# Patient Record
Sex: FEMALE | Race: BLACK OR AFRICAN AMERICAN | NOT HISPANIC OR LATINO | Employment: OTHER | URBAN - METROPOLITAN AREA
[De-identification: names, ages, dates, MRNs, and addresses within clinical notes are randomized per-mention and may not be internally consistent; named-entity substitution may affect disease eponyms.]

---

## 2018-04-05 NOTE — PROGRESS NOTES
Subjective:      Patient ID: Kathie Quezada is a 58 y.o. female.    Chief Complaint:  Thyroid Nodule    History of Present Illness  Kathie Quezada presents today for Evaluation & management of hypothyroidism & type 2 DM.  This is her first visit with me.     She was seeing MDs at [facility] and wants to switch everything over to [facility]. Will get release of records form signed to obtain surgical reports and pathology.     Patient had a stroke ~ 2 years ago.     Had a total thyroidectomy in 08/2017 at [facility] Dr. Colon for mng that was causing compressive symptoms. Per patient pathology was benign. Had FNAs done previously per patient all were benign as well.     Had trouble after surgery with her vocal cords and choking on saliva. Had another surgery to fix the vocal cords and per patient it did not help.    With regards to her hypothyroidism:    Current medication:  synthroid 175 mcg    Takes thyroid medication properly without food first thing in the morning     current symptoms:   + weight gain  + Fatigue   Denies Constipation   + Hair loss  + Brittle nails  + Mental fog   No cp, palpations or sob  Denies heat intolerance  + cold intolerance    Last BMD dated: never     Also has Type 2 DM.   With regards to the diabetes:    Current regimen:  lantus 20 Units AM (pen)    Other meds tried: Metformin (GI Issues)    Missed doses? Yes occ    1 times a day testing  Log reviewed: meter present  7 day avg 116  14 day avg 112  30 day avg 116   116, 109, 118, 105, 133,     Eats 2 meals a day.   Snacks no      Drinks diet coke & water    Exercise - tried to stay active     Hypoglycemic event? None   Knows how to correct with 15 grams of carbs- juice, coke, or a peppermint.     Last eye exam: + DR > 1 year ago. Needs referral   Last podiatry exam: Yes Dr. Duckworth.  + neuropathy      Education - last visit: none    With regards to hypertension:  Tolerating ACEI     With regards to dyslipidemia:  Tolerating statin  "    She had a total hysterectomy for fibroids in 1996.    Also per patient had a hepatitis C antibody that was positive (see media in chart) will need referral to hepatology. She states that she took the "medication" but can't remember name or how long she took it.     Review of Systems   Constitutional: Positive for fatigue and unexpected weight change (gain).   Eyes: Negative for visual disturbance.   Respiratory: Negative for cough and shortness of breath.    Cardiovascular: Negative for chest pain.   Gastrointestinal: Negative for abdominal pain.   Endocrine: Positive for cold intolerance. Negative for heat intolerance, polydipsia, polyphagia and polyuria.   Musculoskeletal: Negative for arthralgias.   Skin: Negative for wound.   Neurological: Negative for headaches.   Hematological: Does not bruise/bleed easily.   Psychiatric/Behavioral: Negative for sleep disturbance.     Objective:   Physical Exam   Constitutional: She appears well-developed.   HENT:   Right Ear: External ear normal.   Left Ear: External ear normal.   Nose: Nose normal.   Hearing Normal     Neck: No tracheal deviation present.   Cardiovascular: Normal rate.    No murmur heard.  No edema present   Pulmonary/Chest: Effort normal and breath sounds normal.   Abdominal: Soft. She exhibits no mass. No hernia.   Neurological: She is alert. No cranial nerve deficit or sensory deficit.   Skin: No rash noted.   Psychiatric: She has a normal mood and affect. Judgment normal.   Vitals reviewed.  No palpable thyroid tissue  Scar well healed   injection sites are ok. No lipo hypertropthy or atrophy  Appropriate footwear, Foot exam deferred per patient. Wears diabetic shoes.     Body mass index is 34.23 kg/m².    Lab Review:   No results found for: HGBA1C  No results found for: CHOL, HDL, LDLCALC, TRIG, CHOLHDL  Lab Results   Component Value Date     11/05/2015    K 3.7 11/05/2015     11/05/2015    CO2 28 11/05/2015     (H) 11/05/2015    " BUN 13 11/05/2015    CREATININE 0.8 11/05/2015    CALCIUM 9.3 11/05/2015    ANIONGAP 11 11/05/2015    ESTGFRAFRICA >60.0 11/05/2015    EGFRNONAA >60.0 11/05/2015     Labs from 12/7/2017 brought by patient  TSH 1.790  T4 1.44  A1C 6.5%    Assessment and Plan     1. Postoperative hypothyroidism  TSH    Vitamin D    DXA Bone Density Spine And Hip   2. Type 2 diabetes mellitus with other circulatory complication, with long-term current use of insulin  Ambulatory referral to Optometry    Hemoglobin A1c    Lipid panel    Comprehensive metabolic panel    Microalbumin/creatinine urine ratio    CBC auto differential   3. Essential hypertension     4. Mixed hyperlipidemia  Lipid panel   5. Positive hepatitis C antibody test  Ambulatory Referral to Hepatology   6. Other long term (current) drug therapy   Vitamin D   7. Asymptomatic menopausal state   DXA Bone Density Spine And Hip     Postoperative hypothyroidism  -- Clinically hypothyroid  -- Goal is a normal TSH  -- Check TFTs today and adjust dosage accordingly   -- Avoid exogenous hyperthyroidism as this can accelerate bone loss and increase risk of CV complications.  -- Advised to take LT4 on an empty stomach with water and to wait 30-45 minutes before eating or taking other medications   -- Reviewed that symptoms of hypothyroidism may not correlate with tsh, and a normal TSH is the goal of therapy.....  symptoms are not a justification for over treatment   -- If still have vocal cord issues at 3 month appt will refer her to ENT.    -- check BMD & Vit D d/t long term lt4 use possibly causing bone loss       Type 2 diabetes mellitus with circulatory disorder, with long-term current use of insulin  -- Labs & urine today   -- Reviewed goals of therapy are to get the best control we can without hypoglycemia  Medication changes:   Continue lantus 20u daily   -- Reviewed patient's current insulin regimen. Clarified proper insulin dose and timing in relation to meals, etc.  Insulin injection sites and proper rotation instructed.    -- Advised frequent self blood glucose monitoring.  Patient encouraged to document glucose results and bring them to every clinic visit    -- Hypoglycemia precautions discussed. Instructed on precautions before driving.    -- Call for Bg repeatedly < 90 or > 180.   -- Close adherence to lifestyle changes recommended.   -- Periodic follow ups for eye evaluations, foot care and dental care suggested.  -- Schedule optometry appt     Essential hypertension  -- on ACEI   -- Controlled  -- Blood pressure goals discussed with patient    Mixed hyperlipidemia  Controlled   On statin per ADA recommendations  Check LP today     Positive hepatitis C antibody test  -- refer to hepatology       Follow-up in about 3 months (around 7/9/2018).    Case discussed with Dr. Leija    Recommendations were discussed with the patient in detail  The patient verbalized understanding and agrees with the plan outlined as above.

## 2018-04-09 ENCOUNTER — TELEPHONE (OUTPATIENT)
Dept: ENDOCRINOLOGY | Facility: CLINIC | Age: 58
End: 2018-04-09

## 2018-04-09 ENCOUNTER — TELEPHONE (OUTPATIENT)
Dept: HEPATOLOGY | Facility: CLINIC | Age: 58
End: 2018-04-09

## 2018-04-09 ENCOUNTER — OFFICE VISIT (OUTPATIENT)
Dept: ENDOCRINOLOGY | Facility: CLINIC | Age: 58
End: 2018-04-09
Payer: MEDICARE

## 2018-04-09 ENCOUNTER — LAB VISIT (OUTPATIENT)
Dept: LAB | Facility: HOSPITAL | Age: 58
End: 2018-04-09
Payer: MEDICARE

## 2018-04-09 VITALS
DIASTOLIC BLOOD PRESSURE: 68 MMHG | WEIGHT: 245.38 LBS | HEIGHT: 71 IN | BODY MASS INDEX: 34.35 KG/M2 | HEART RATE: 64 BPM | SYSTOLIC BLOOD PRESSURE: 128 MMHG

## 2018-04-09 DIAGNOSIS — R76.8 POSITIVE HEPATITIS C ANTIBODY TEST: ICD-10-CM

## 2018-04-09 DIAGNOSIS — R80.9 PROTEINURIA, UNSPECIFIED TYPE: Primary | ICD-10-CM

## 2018-04-09 DIAGNOSIS — I10 ESSENTIAL HYPERTENSION: ICD-10-CM

## 2018-04-09 DIAGNOSIS — Z79.899 OTHER LONG TERM (CURRENT) DRUG THERAPY: ICD-10-CM

## 2018-04-09 DIAGNOSIS — Z79.4 TYPE 2 DIABETES MELLITUS WITH OTHER CIRCULATORY COMPLICATION, WITH LONG-TERM CURRENT USE OF INSULIN: ICD-10-CM

## 2018-04-09 DIAGNOSIS — E11.59 TYPE 2 DIABETES MELLITUS WITH OTHER CIRCULATORY COMPLICATION, WITH LONG-TERM CURRENT USE OF INSULIN: ICD-10-CM

## 2018-04-09 DIAGNOSIS — E89.0 POSTOPERATIVE HYPOTHYROIDISM: Primary | ICD-10-CM

## 2018-04-09 DIAGNOSIS — E78.2 MIXED HYPERLIPIDEMIA: ICD-10-CM

## 2018-04-09 DIAGNOSIS — E89.0 POSTOPERATIVE HYPOTHYROIDISM: ICD-10-CM

## 2018-04-09 DIAGNOSIS — Z78.0 ASYMPTOMATIC MENOPAUSAL STATE: ICD-10-CM

## 2018-04-09 LAB
25(OH)D3+25(OH)D2 SERPL-MCNC: 11 NG/ML
ALBUMIN SERPL BCP-MCNC: 3.1 G/DL
ALP SERPL-CCNC: 135 U/L
ALT SERPL W/O P-5'-P-CCNC: 25 U/L
ANION GAP SERPL CALC-SCNC: 8 MMOL/L
AST SERPL-CCNC: 36 U/L
BASOPHILS # BLD AUTO: 0.05 K/UL
BASOPHILS NFR BLD: 0.5 %
BILIRUB SERPL-MCNC: 0.3 MG/DL
BUN SERPL-MCNC: 27 MG/DL
CALCIUM SERPL-MCNC: 8.8 MG/DL
CHLORIDE SERPL-SCNC: 108 MMOL/L
CHOLEST SERPL-MCNC: 209 MG/DL
CHOLEST/HDLC SERPL: 2.7 {RATIO}
CO2 SERPL-SCNC: 26 MMOL/L
CREAT SERPL-MCNC: 1.3 MG/DL
DIFFERENTIAL METHOD: ABNORMAL
EOSINOPHIL # BLD AUTO: 0.1 K/UL
EOSINOPHIL NFR BLD: 1.4 %
ERYTHROCYTE [DISTWIDTH] IN BLOOD BY AUTOMATED COUNT: 13.9 %
EST. GFR  (AFRICAN AMERICAN): 53 ML/MIN/1.73 M^2
EST. GFR  (NON AFRICAN AMERICAN): 46 ML/MIN/1.73 M^2
ESTIMATED AVG GLUCOSE: 103 MG/DL
GLUCOSE SERPL-MCNC: 119 MG/DL
HBA1C MFR BLD HPLC: 5.2 %
HCT VFR BLD AUTO: 34.1 %
HDLC SERPL-MCNC: 77 MG/DL
HDLC SERPL: 36.8 %
HGB BLD-MCNC: 10.9 G/DL
IMM GRANULOCYTES # BLD AUTO: 0.04 K/UL
IMM GRANULOCYTES NFR BLD AUTO: 0.4 %
LDLC SERPL CALC-MCNC: 97.4 MG/DL
LYMPHOCYTES # BLD AUTO: 2.1 K/UL
LYMPHOCYTES NFR BLD: 20.8 %
MCH RBC QN AUTO: 27.3 PG
MCHC RBC AUTO-ENTMCNC: 32 G/DL
MCV RBC AUTO: 86 FL
MONOCYTES # BLD AUTO: 0.7 K/UL
MONOCYTES NFR BLD: 6.9 %
NEUTROPHILS # BLD AUTO: 7.2 K/UL
NEUTROPHILS NFR BLD: 70 %
NONHDLC SERPL-MCNC: 132 MG/DL
NRBC BLD-RTO: 0 /100 WBC
PLATELET # BLD AUTO: 169 K/UL
PMV BLD AUTO: 10.7 FL
POTASSIUM SERPL-SCNC: 3.5 MMOL/L
PROT SERPL-MCNC: 7.3 G/DL
RBC # BLD AUTO: 3.99 M/UL
SODIUM SERPL-SCNC: 142 MMOL/L
T4 FREE SERPL-MCNC: 0.92 NG/DL
TRIGL SERPL-MCNC: 173 MG/DL
TSH SERPL DL<=0.005 MIU/L-ACNC: 20.45 UIU/ML
WBC # BLD AUTO: 10.21 K/UL

## 2018-04-09 PROCEDURE — 84443 ASSAY THYROID STIM HORMONE: CPT

## 2018-04-09 PROCEDURE — 99999 PR PBB SHADOW E&M-EST. PATIENT-LVL IV: CPT | Mod: PBBFAC,,, | Performed by: NURSE PRACTITIONER

## 2018-04-09 PROCEDURE — 80061 LIPID PANEL: CPT

## 2018-04-09 PROCEDURE — 84439 ASSAY OF FREE THYROXINE: CPT

## 2018-04-09 PROCEDURE — 82306 VITAMIN D 25 HYDROXY: CPT

## 2018-04-09 PROCEDURE — 36415 COLL VENOUS BLD VENIPUNCTURE: CPT

## 2018-04-09 PROCEDURE — 85025 COMPLETE CBC W/AUTO DIFF WBC: CPT

## 2018-04-09 PROCEDURE — 99204 OFFICE O/P NEW MOD 45 MIN: CPT | Mod: S$PBB,,, | Performed by: NURSE PRACTITIONER

## 2018-04-09 PROCEDURE — 80053 COMPREHEN METABOLIC PANEL: CPT

## 2018-04-09 PROCEDURE — 99214 OFFICE O/P EST MOD 30 MIN: CPT | Mod: PBBFAC | Performed by: NURSE PRACTITIONER

## 2018-04-09 PROCEDURE — 83036 HEMOGLOBIN GLYCOSYLATED A1C: CPT

## 2018-04-09 RX ORDER — LEVOTHYROXINE SODIUM 175 UG/1
175 TABLET ORAL DAILY
COMMUNITY
Start: 2018-01-23 | End: 2018-04-09

## 2018-04-09 RX ORDER — TRAZODONE HYDROCHLORIDE 50 MG/1
50 TABLET ORAL DAILY PRN
Refills: 0 | Status: ON HOLD | COMMUNITY
Start: 2018-02-26 | End: 2019-05-30 | Stop reason: SDUPTHER

## 2018-04-09 RX ORDER — LISINOPRIL 40 MG/1
20 TABLET ORAL DAILY
Status: ON HOLD | COMMUNITY
Start: 2018-01-21 | End: 2019-05-29 | Stop reason: SDUPTHER

## 2018-04-09 RX ORDER — ERGOCALCIFEROL 1.25 MG/1
CAPSULE ORAL
Qty: 12 CAPSULE | Refills: 0 | Status: SHIPPED | OUTPATIENT
Start: 2018-04-09 | End: 2018-06-06 | Stop reason: SDUPTHER

## 2018-04-09 RX ORDER — ATORVASTATIN CALCIUM 10 MG/1
TABLET, FILM COATED ORAL
Refills: 1 | Status: ON HOLD | COMMUNITY
Start: 2018-01-23 | End: 2019-05-30 | Stop reason: SDUPTHER

## 2018-04-09 RX ORDER — AMLODIPINE BESYLATE 5 MG/1
TABLET ORAL
COMMUNITY
Start: 2018-03-05 | End: 2018-05-16 | Stop reason: ALTCHOICE

## 2018-04-09 RX ORDER — INSULIN GLARGINE 100 [IU]/ML
20 INJECTION, SOLUTION SUBCUTANEOUS DAILY
Refills: 1 | COMMUNITY
Start: 2018-01-26 | End: 2018-10-18

## 2018-04-09 RX ORDER — LEVOTHYROXINE SODIUM 200 UG/1
200 TABLET ORAL DAILY
Qty: 30 TABLET | Refills: 11 | Status: SHIPPED | OUTPATIENT
Start: 2018-04-09 | End: 2019-05-13 | Stop reason: SDUPTHER

## 2018-04-09 RX ORDER — CARVEDILOL 25 MG/1
25 TABLET ORAL 2 TIMES DAILY WITH MEALS
COMMUNITY
Start: 2018-03-02 | End: 2019-11-08 | Stop reason: SDUPTHER

## 2018-04-09 NOTE — ASSESSMENT & PLAN NOTE
-- Clinically hypothyroid  -- Goal is a normal TSH  -- Check TFTs today and adjust dosage accordingly   -- Avoid exogenous hyperthyroidism as this can accelerate bone loss and increase risk of CV complications.  -- Advised to take LT4 on an empty stomach with water and to wait 30-45 minutes before eating or taking other medications   -- Reviewed that symptoms of hypothyroidism may not correlate with tsh, and a normal TSH is the goal of therapy.....  symptoms are not a justification for over treatment   -- If still have vocal cord issues at 3 month appt will refer her to ENT.    -- check BMD & Vit D d/t long term lt4 use possibly causing bone loss

## 2018-04-09 NOTE — ASSESSMENT & PLAN NOTE
-- Labs & urine today   -- Reviewed goals of therapy are to get the best control we can without hypoglycemia  Medication changes:   Continue lantus 20u daily   -- Reviewed patient's current insulin regimen. Clarified proper insulin dose and timing in relation to meals, etc. Insulin injection sites and proper rotation instructed.    -- Advised frequent self blood glucose monitoring.  Patient encouraged to document glucose results and bring them to every clinic visit    -- Hypoglycemia precautions discussed. Instructed on precautions before driving.    -- Call for Bg repeatedly < 90 or > 180.   -- Close adherence to lifestyle changes recommended.   -- Periodic follow ups for eye evaluations, foot care and dental care suggested.  -- Schedule optometry appt

## 2018-04-09 NOTE — TELEPHONE ENCOUNTER
Please contact pt to schedule for an appointment. Orders are in per Hemalatha Hollins NP --Positive hepatitis C antibody test

## 2018-04-09 NOTE — TELEPHONE ENCOUNTER
----- Message from Hemalatha Hollins NP sent at 4/9/2018 12:05 PM CDT -----  Please schedule TSH in 4 weeks and alert patient.    Thank you,   Hemalatha

## 2018-04-09 NOTE — TELEPHONE ENCOUNTER
Dr. Hemalatha Hollins ordered that patient be scheduled for hep c consult.  I spoke with patient and scheduled her to see PA Scheuermann on 4/23; appt notice mailed.  See media for hep c quant result.

## 2018-04-09 NOTE — TELEPHONE ENCOUNTER
----- Message from Kisha Kirkpatrick sent at 4/9/2018  2:07 PM CDT -----  Contact: Pt  Pt calling to schedule appointment    Pt. [phone]

## 2018-04-09 NOTE — TELEPHONE ENCOUNTER
Spoke with pt and gave a contact no for Hepatology for schedule appointment.  Endo dept dont have a access for schedule.

## 2018-04-09 NOTE — PROGRESS NOTES
Spoke with patient and ensured that she is taking synthroid 175 mcg daily and that she took it this morning. She stated that she did and she takes it every day. Informed her about her lab results and increased her syntrhoid to 200 mcg daily. Also informed her about her vitamin D level being low, ordering ergo for 5 days+7weeks. She verbalized understanding. TSH in 4 weeks.

## 2018-04-20 ENCOUNTER — HOSPITAL ENCOUNTER (OUTPATIENT)
Dept: RADIOLOGY | Facility: CLINIC | Age: 58
Discharge: HOME | End: 2018-04-20
Attending: NURSE PRACTITIONER
Payer: MEDICARE

## 2018-04-20 DIAGNOSIS — Z78.0 ASYMPTOMATIC MENOPAUSAL STATE: ICD-10-CM

## 2018-04-20 DIAGNOSIS — E89.0 POSTOPERATIVE HYPOTHYROIDISM: ICD-10-CM

## 2018-04-20 PROCEDURE — 77080 DXA BONE DENSITY AXIAL: CPT | Mod: 26,,, | Performed by: INTERNAL MEDICINE

## 2018-04-20 PROCEDURE — 77080 DXA BONE DENSITY AXIAL: CPT | Mod: TC

## 2018-04-26 ENCOUNTER — TELEPHONE (OUTPATIENT)
Dept: HEPATOLOGY | Facility: CLINIC | Age: 58
End: 2018-04-26

## 2018-04-26 NOTE — TELEPHONE ENCOUNTER
----- Message from Unique Good sent at 4/26/2018 10:17 AM CDT -----  Contact: pt  Calling to resched missed appt from 4/23    Pt contact [phone]

## 2018-05-02 ENCOUNTER — OFFICE VISIT (OUTPATIENT)
Dept: OPTOMETRY | Facility: CLINIC | Age: 58
End: 2018-05-02
Payer: MEDICARE

## 2018-05-02 DIAGNOSIS — E11.3293 MILD NONPROLIFERATIVE DIABETIC RETINOPATHY OF BOTH EYES WITHOUT MACULAR EDEMA ASSOCIATED WITH TYPE 2 DIABETES MELLITUS: Primary | ICD-10-CM

## 2018-05-02 DIAGNOSIS — H57.12 DISCOMFORT OF LEFT EYE: ICD-10-CM

## 2018-05-02 DIAGNOSIS — H52.4 MYOPIA WITH PRESBYOPIA OF BOTH EYES: ICD-10-CM

## 2018-05-02 DIAGNOSIS — H52.13 MYOPIA WITH PRESBYOPIA OF BOTH EYES: ICD-10-CM

## 2018-05-02 PROCEDURE — 92004 COMPRE OPH EXAM NEW PT 1/>: CPT | Mod: S$PBB,,, | Performed by: OPTOMETRIST

## 2018-05-02 PROCEDURE — 99212 OFFICE O/P EST SF 10 MIN: CPT | Mod: PBBFAC | Performed by: OPTOMETRIST

## 2018-05-02 PROCEDURE — 92015 DETERMINE REFRACTIVE STATE: CPT | Mod: ,,, | Performed by: OPTOMETRIST

## 2018-05-02 PROCEDURE — 99999 PR PBB SHADOW E&M-EST. PATIENT-LVL II: CPT | Mod: PBBFAC,,, | Performed by: OPTOMETRIST

## 2018-05-02 NOTE — LETTER
May 2, 2018      Hemalatha Hollins, NP  [address]  [facility] [address]           [facility]  [address]  [facility] [address]  Phone: [phone]  Fax: [phone]          Patient: Kathie Quezada   MR Number: [id number]   YOB: 1960   Date of Visit: 5/2/2018       Dear Hemalatha Hollins:    Thank you for referring Kathie Quezada to me for evaluation. Attached you will find relevant portions of my assessment and plan of care.    If you have questions, please do not hesitate to call me. I look forward to following Kathie Quezada along with you.    Sincerely,    Sherin Santizo, OD    Enclosure  CC:  No Recipients    If you would like to receive this communication electronically, please contact [email or web address] or [phone] to request more information on [employer] Link access.    For providers and/or their staff who would like to refer a patient to [facility], please contact us through our one-stop-shop provider referral line, [facility], at [phone].    If you feel you have received this communication in error or would no longer like to receive these types of communications, please e-mail [email or web address]

## 2018-05-02 NOTE — PROGRESS NOTES
HPI     Pt here for Diabetic /Hypertensive eye exam   Last Blood Sugar - (119 yesterday)  IRENA - last year     Pt wearing bifocal glasses 2-3 years old no changes in va with glasses but   does need to update glasses today.   FB sensation in OS x 2-3 days ago.  Tried using Visine gtts does not help     (-)Flashes (+)Floaters  (+)Itch, (+)tear, (+)burn, (+)Dryness.  (+) OTC Drops  (+)Photophobia  (+)Glare    Pt denies any eye sx or injuries       FAMOHX:   Mother - Glaucoma         Hemoglobin A1C       Date                     Value               Ref Range             Status                04/09/2018               5.2                 4.0 - 5.6 %           Final            ----------      Last edited by Krystle Whitney on 5/2/2018 11:00 AM. (History)            Assessment /Plan     For exam results, see Encounter Report.    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  Educated pt on diabetic changes.  Continue working on good blood sugar control.  Monitor 6 months.   2.  No foreign body found.  Recommend artificial tears as needed.  3.  Bifocal rx given

## 2018-05-07 ENCOUNTER — LAB VISIT (OUTPATIENT)
Dept: LAB | Facility: OTHER | Age: 58
End: 2018-05-07
Payer: MEDICARE

## 2018-05-07 DIAGNOSIS — E89.0 POSTOPERATIVE HYPOTHYROIDISM: ICD-10-CM

## 2018-05-07 LAB
T4 FREE SERPL-MCNC: 1.45 NG/DL
TSH SERPL DL<=0.005 MIU/L-ACNC: 0.14 UIU/ML

## 2018-05-07 PROCEDURE — 36415 COLL VENOUS BLD VENIPUNCTURE: CPT

## 2018-05-07 PROCEDURE — 84443 ASSAY THYROID STIM HORMONE: CPT

## 2018-05-07 PROCEDURE — 84439 ASSAY OF FREE THYROXINE: CPT

## 2018-05-08 ENCOUNTER — TELEPHONE (OUTPATIENT)
Dept: ENDOCRINOLOGY | Facility: CLINIC | Age: 58
End: 2018-05-08

## 2018-05-08 DIAGNOSIS — E89.0 POSTOPERATIVE HYPOTHYROIDISM: Primary | ICD-10-CM

## 2018-05-08 NOTE — TELEPHONE ENCOUNTER
Spoke with the pt and advise her to take half a pill of synthroid on Sunday and do one pill mon to sat. Also schedule for TSH lab in 4 weeks and alert the pt.

## 2018-05-16 ENCOUNTER — LAB VISIT (OUTPATIENT)
Dept: LAB | Facility: HOSPITAL | Age: 58
End: 2018-05-16
Payer: MEDICARE

## 2018-05-16 ENCOUNTER — INITIAL CONSULT (OUTPATIENT)
Dept: HEPATOLOGY | Facility: CLINIC | Age: 58
End: 2018-05-16
Payer: MEDICARE

## 2018-05-16 ENCOUNTER — OFFICE VISIT (OUTPATIENT)
Dept: NEPHROLOGY | Facility: CLINIC | Age: 58
End: 2018-05-16
Payer: MEDICARE

## 2018-05-16 VITALS
OXYGEN SATURATION: 98 % | DIASTOLIC BLOOD PRESSURE: 82 MMHG | RESPIRATION RATE: 18 BRPM | SYSTOLIC BLOOD PRESSURE: 181 MMHG | TEMPERATURE: 98 F | WEIGHT: 240.94 LBS | BODY MASS INDEX: 34.49 KG/M2 | HEIGHT: 70 IN | HEART RATE: 84 BPM

## 2018-05-16 VITALS
OXYGEN SATURATION: 98 % | SYSTOLIC BLOOD PRESSURE: 140 MMHG | DIASTOLIC BLOOD PRESSURE: 80 MMHG | HEIGHT: 71 IN | HEART RATE: 88 BPM | WEIGHT: 240.75 LBS | BODY MASS INDEX: 33.7 KG/M2

## 2018-05-16 DIAGNOSIS — E11.59 TYPE 2 DIABETES MELLITUS WITH OTHER CIRCULATORY COMPLICATION, WITH LONG-TERM CURRENT USE OF INSULIN: Primary | ICD-10-CM

## 2018-05-16 DIAGNOSIS — N18.31 CKD STAGE G3A/A3, GFR 45-59 AND ALBUMIN CREATININE RATIO >300 MG/G: ICD-10-CM

## 2018-05-16 DIAGNOSIS — Z79.4 TYPE 2 DIABETES MELLITUS WITH OTHER CIRCULATORY COMPLICATION, WITH LONG-TERM CURRENT USE OF INSULIN: Primary | ICD-10-CM

## 2018-05-16 DIAGNOSIS — R76.8 POSITIVE HEPATITIS C ANTIBODY TEST: ICD-10-CM

## 2018-05-16 DIAGNOSIS — B18.2 CHRONIC HEPATITIS C WITHOUT HEPATIC COMA: Primary | ICD-10-CM

## 2018-05-16 DIAGNOSIS — K76.9 LIVER LESION: ICD-10-CM

## 2018-05-16 DIAGNOSIS — I10 ESSENTIAL HYPERTENSION: ICD-10-CM

## 2018-05-16 LAB
BACTERIA #/AREA URNS AUTO: ABNORMAL /HPF
BILIRUB UR QL STRIP: NEGATIVE
CLARITY UR REFRACT.AUTO: ABNORMAL
COLOR UR AUTO: YELLOW
CREAT UR-MCNC: 112 MG/DL
GLUCOSE UR QL STRIP: ABNORMAL
HGB UR QL STRIP: ABNORMAL
HYALINE CASTS UR QL AUTO: 0 /LPF
KETONES UR QL STRIP: ABNORMAL
LEUKOCYTE ESTERASE UR QL STRIP: NEGATIVE
MICROSCOPIC COMMENT: ABNORMAL
NITRITE UR QL STRIP: NEGATIVE
PH UR STRIP: 6 [PH] (ref 5–8)
PROT UR QL STRIP: ABNORMAL
PROT UR-MCNC: 509 MG/DL
PROT/CREAT RATIO, UR: 4.54
RBC #/AREA URNS AUTO: 9 /HPF (ref 0–4)
SP GR UR STRIP: 1.01 (ref 1–1.03)
SQUAMOUS #/AREA URNS AUTO: 5 /HPF
URN SPEC COLLECT METH UR: ABNORMAL
UROBILINOGEN UR STRIP-ACNC: NEGATIVE EU/DL
WBC #/AREA URNS AUTO: 1 /HPF (ref 0–5)

## 2018-05-16 PROCEDURE — 99213 OFFICE O/P EST LOW 20 MIN: CPT | Mod: PBBFAC,27 | Performed by: INTERNAL MEDICINE

## 2018-05-16 PROCEDURE — 99203 OFFICE O/P NEW LOW 30 MIN: CPT | Mod: S$PBB,,, | Performed by: PHYSICIAN ASSISTANT

## 2018-05-16 PROCEDURE — 99204 OFFICE O/P NEW MOD 45 MIN: CPT | Mod: S$PBB,GC,, | Performed by: INTERNAL MEDICINE

## 2018-05-16 PROCEDURE — 99999 PR PBB SHADOW E&M-EST. PATIENT-LVL IV: CPT | Mod: PBBFAC,,, | Performed by: PHYSICIAN ASSISTANT

## 2018-05-16 PROCEDURE — 81001 URINALYSIS AUTO W/SCOPE: CPT

## 2018-05-16 PROCEDURE — 82570 ASSAY OF URINE CREATININE: CPT

## 2018-05-16 PROCEDURE — 99999 PR PBB SHADOW E&M-EST. PATIENT-LVL III: CPT | Mod: PBBFAC,GC,, | Performed by: INTERNAL MEDICINE

## 2018-05-16 PROCEDURE — 99214 OFFICE O/P EST MOD 30 MIN: CPT | Mod: PBBFAC | Performed by: PHYSICIAN ASSISTANT

## 2018-05-16 RX ORDER — CHLORTHALIDONE 25 MG/1
25 TABLET ORAL DAILY
Qty: 30 TABLET | Refills: 11 | Status: SHIPPED | OUTPATIENT
Start: 2018-05-16 | End: 2019-01-14

## 2018-05-16 NOTE — PROGRESS NOTES
Subjective:       Patient ID: Kathie Quezada is a 58 y.o. Black or  female who presents for new evaluation of Proteinuria    HPI   Ms. Quezada is a 59 yo AAF with HTN, T2DM, and +HCV Ab who was referred to nephrology by her endocrinologist for evaluation of proteinuria and decreased eGFR. She has recently transferred her care to [facility] from [facility]. Chart review reveals only two data points: sCr 0.8 in 2015 and sCr 1.3 (with eGFR 53) in 4/2018. Similarly, ACR was 47.8 in 2006 and most recently 2788 on 4/2018.     She was diagnosed with T2DM prior to 2005. She has been on insulin since ~2008. Diabetes has been decently controlled with highest A1c in the 7-8s. Most recent A1c was 5.2%. Last eye exam on 5/2/18 with bilateral diabetic retinopathy. She was initially diagnosed with this in 2016.   She was diagnosed with HTN in the 1980s. BP has been controlled on-and-off; reports a h/o dietary non-compliance which is no improved. +h/o hospitalization for HTN emergency; + h/o CVA.   She has a h/o HCV Ab+. She completed Harvoni treatment in 2015.   She denies personal h/o kidney stones, recurrent UTIs, or significant NSAID use. She denies a family h/o kidney disease or ESRD.       Past Medical History:   Diagnosis Date    CVA (cerebral vascular accident)     Diabetes mellitus, type 2     Factor XI deficiency     Hyperlipidemia     Hypertension     Postoperative hypothyroidism      Past Surgical History:   Procedure Laterality Date    BACK SURGERY      galbladder      HYSTERECTOMY      TONSILLECTOMY       Family History   Problem Relation Age of Onset    Diabetes type II Mother      Social History     Social History    Marital status:      Spouse name: N/A    Number of children: N/A    Years of education: N/A     Social History Main Topics    Smoking status: Never Smoker    Smokeless tobacco: Never Used    Alcohol use Yes    Drug use: Unknown    Sexual activity: Not Asked  "    Other Topics Concern    None     Social History Narrative    None         Review of Systems    Const: no fevers or chills  Endo: stays cold all the time. +polydipsia  Eyes: +vision changes and blurry vision; holding off on laser surgery  HENT: no sore throat. +rhinorrhea and hoarseness ever since thyroid surgery  Heme: +easy bleeding/bruising 2/2 factor XI deficiency  CV: no chest pain. +BLE edema that started a few months ago (amlodipine started around same time)  Resp: no SOB or cough  GI: +diarrhea; no constipation  : +polyuria. +nocturia. No dysuria or hematuria.   Neuro: + neuropathy in bilateral feet; rarely in hands.   Skin: no new rashes or lesions; +dry skin      Objective:       Current Outpatient Prescriptions:     amLODIPine (NORVASC) 5 MG tablet, , Disp: , Rfl:     atorvastatin (LIPITOR) 10 MG tablet, TK 1 T PO QHS, Disp: , Rfl: 1    carvedilol (COREG) 25 MG tablet, , Disp: , Rfl:     ergocalciferol (ERGOCALCIFEROL) 50,000 unit Cap, Take 1 capsules once daily for 5 days, then 1 capsule weekly for 7 weeks. Yes I want daily for 5 days + weekly for 7 weeks, Disp: 12 capsule, Rfl: 0    LANTUS SOLOSTAR U-100 INSULIN 100 unit/mL (3 mL) InPn pen, Inject 20 Units into the skin once daily., Disp: , Rfl: 1    lisinopril (PRINIVIL,ZESTRIL) 40 MG tablet, Take 40 mg by mouth once daily., Disp: , Rfl:     SYNTHROID 200 mcg tablet, Take 1 tablet (200 mcg total) by mouth once daily. BRAND NAME ONLY, Disp: 30 tablet, Rfl: 11    traZODone (DESYREL) 50 MG tablet, Take 50 mg by mouth once daily., Disp: , Rfl: 0    Physical Exam    Blood pressure (!) 140/80, pulse 88, height 5' 11" (1.803 m), weight 109.2 kg (240 lb 11.9 oz), SpO2 98 %.  General: WDWN AAF in NAD.  Eyes: EOMI, clear conjunctivae  HENT: NC/AT. MMM.  Neck: supple, no thyromegaly  Lymph: no cervical or supraclavicular LAD  CV: RRR. BLE with 1+ pitting edema and chronic venous stasis changes  Resp: Decreased breath sounds. Normal effort. CTAB, no " crackles.   Abd: Soft, NTND  Skin: warm, normal turgor. No visible rash  Neuro: AAO. No focal deficits      Assessment:       1. Type 2 diabetes mellitus with other circulatory complication, with long-term current use of insulin    2. CKD stage G3a/A3, GFR 45-59 and albumin creatinine ratio >300 mg/g    3. Positive hepatitis C antibody test    4. Essential hypertension        Plan:   1. CKD:   - unsure baseline renal function; most recent sCr consistent with CKD stage 3  - proteinuria present since at least 2006; currently approaching nephrotic range at 2.8g  - most likely related to diabetic nephropathy; especially given diabetic retinopathy. Cannot r/o contribution from HTN given significant history of HTN-related complications.   - will order serologies today to r/o other etiologies  - will repeat RFP today to help establish baseline GFR  - UA and UPCR today. If similar results as ACR, will order 24-hour urine protein:creatinine  - renal US  - HTN: goal BP < 130/80       Lab Results   Component Value Date    CREATININE 1.3 04/09/2018       Protein Creatinine Ratios: see above. Continue max dose lisinopril. Educated patient about low salt diet.   - ordered UA, UPCR, JB, ANCA, C3, C4, ALCIDES, and FLC to look for other etiologies aside from diabetes. Also ordered RF and cryo given h/o HCV.     No results found for: UTPCR    Acid-Base: wnl.   Lab Results   Component Value Date     04/09/2018    K 3.5 04/09/2018    CO2 26 04/09/2018     2. HTN: Blood pressures uncontrolled on current regimen; also with peripheral edema. Will discontinue amlodipine and replace with chlorthalidone 25mg daily.     3. Renal osteodystrophy: currently with CKD stage 3a. Will re-evaluate upon progression of disease.   Lab Results   Component Value Date    CALCIUM 8.8 04/09/2018       4. Anemia: unlikely to be renal in origin given CKD stage 3a. However in setting of anemia and proteinuria, will order ALCIDES and FLC. See above.   Lab Results    Component Value Date    HGB 10.9 (L) 04/09/2018        5. DM:  Last HbA1C controlled. Continue current management.   Lab Results   Component Value Date    HGBA1C 5.2 04/09/2018       6. Lipid management: would benefit from high intensity statin; will defer to PCP.   Lab Results   Component Value Date    LDLCALC 97.4 04/09/2018       Follow up in 3 months.    Deborah Holguin, PGY-5  Nephrology Fellow  [facility]  Pager: [phone]

## 2018-05-16 NOTE — LETTER
May 16, 2018      Hemalatha Hollins, NP  [address]  [facility] [address]           [facility]  [address]  [facility] [address]  Phone: [phone]  Fax: [phone]          Patient: Kathie Quezada   MR Number: [id number]   YOB: 1960   Date of Visit: 5/16/2018       Dear Hemalatha Hollins:    Thank you for referring Kathie Quezada to me for evaluation. Attached you will find relevant portions of my assessment and plan of care.    If you have questions, please do not hesitate to call me. I look forward to following Kathie Quezada along with you.    Sincerely,    Jennifer B. Scheuermann, PA    Enclosure  CC:  No Recipients    If you would like to receive this communication electronically, please contact [email or web address] or [phone] to request more information on [employer] Link access.    For providers and/or their staff who would like to refer a patient to [facility], please contact us through our one-stop-shop provider referral line, [facility], at [phone].    If you feel you have received this communication in error or would no longer like to receive these types of communications, please e-mail [email or web address]

## 2018-05-16 NOTE — PROGRESS NOTES
"HEPATOLOGY CLINIC VISIT NOTE - HCV clinic    REFERRING PROVIDER: Hemalatha Hollins NP    CHIEF COMPLAINT: Hepatitis C     HISTORY: This is a 58 y.o. Black or  female referred for a history of HCV    Pt reports being dx w/ HCV a few years ago  She was seen by Dr Herbert ([facility]) & treated w/ harvoni - unclear of 8 vs 12 wks duration  Had gallbladder problems towards end of treatment & required surgery. Does not think she ever had labs to assess for SVR w/ Dr Herbert; doesn't know if she's cured or not.    Does not recall being told of liver staging prior to HCV treatment    Recent labs reveal no obvious liver dysfunction or evidence of advanced liver fibrosis    10/2015 U/S in [facility] - hepatomegaly. Spleen not measured.   2/2016 MRI in [facility] - "Partially evaluated liver demonstrates several foci of ill-defined T2 hyperintensity and portal venous hyper enhancement in hepatic segment VI.  Largest focus measures approximately 2.6 cm in diameter"   Indeterminate, favors benign etiology    Unclear if above imaging was followed up on w/ outside GI    Pt feels well  Denies jaundice, dark urine, abdominal distention, hematemesis, melena, slowed mentation.   No abnormal skin rashes. No generalized joint pain.                     Past Medical History:   Diagnosis Date    CVA (cerebral vascular accident)     Diabetes mellitus, type 2     Factor XI deficiency     Hyperlipidemia     Hypertension     Postoperative hypothyroidism        Past Surgical History:   Procedure Laterality Date    BACK SURGERY      galbladder      HYSTERECTOMY      TONSILLECTOMY         FAMILY HISTORY: Negative for liver disease    SOCIAL HISTORY:   History   Smoking Status    Never Smoker   Smokeless Tobacco    Never Used     Alcohol - none  Drugs - none        ROS:   No fever, chills, weight loss, fatigue  No chest pain, palpitations, dyspnea, cough  No abdominal pain, change in bowel pattern. (+) diarrhea. no nausea, vomiting, " "GERD  No dysuria, hematuria   No skin rashes   No headaches  (+) easy bruising / bleeding (Factor XI deficiency)  (+) lower extremity edema - "Maybe from my BP med"  No depression or anxiety      PHYSICAL EXAM:  Friendly Black or  female, in no acute distress; alert and oriented to person, place and time  VITALS: reviewed  HEENT: Sclerae anicteric.   NECK: Supple  CVS: Regular rate and rhythm. No murmurs  LUNGS: Normal respiratory effort. Clear bilaterally  ABDOMEN: Flat, soft, nontender. No organomegaly or masses. No ascites or hernias. Good bowel sounds.    SKIN: Warm and dry. No jaundice, No obvious rashes.   EXTREMITIES: 1+ lower extremity edema  NEURO/PSYCH: Normal gate. Memory intact. Thought and speech pattern appropriate. Behavior normal. No depression or anxiety noted.    RECENT LABS:  Lab Results   Component Value Date    WBC 10.21 04/09/2018    HGB 10.9 (L) 04/09/2018     04/09/2018     No results found for: INR  Lab Results   Component Value Date    AST 36 04/09/2018    ALT 25 04/09/2018    BILITOT 0.3 04/09/2018    ALBUMIN 3.1 (L) 04/09/2018    ALKPHOS 135 04/09/2018    CREATININE 1.3 04/09/2018    BUN 27 (H) 04/09/2018     04/09/2018    K 3.5 04/09/2018         RECENT IMAGING:  Results for orders placed during the hospital encounter of 02/04/16   MRI Abdomen W WO Contrast    Narrative Comparison: 10/20/15, 10/6/15    Technique: Multiplanar, multi-sequence MR imaging of the abdomen was performed before and after administration of 10 mL Gadavist gadolinium-based IV contrast per routine protocol.    Findings:    Kidneys demonstrate normal morphology and signal with no focal lesions.  No hydronephrosis.  No perinephric stranding.    Partially evaluated liver demonstrates several foci of ill-defined T2 hyperintensity and portal venous hyper enhancement in hepatic segment VI.  Largest focus measures approximately 2.6 cm in diameter.  Portal, hepatic and splenic veins are " patent.    Status post cholecystectomy.  No biliary dilatation.  Pancreas, spleen, and adrenal glands are unremarkable.  Hemangioma noted in the L2 vertebral body.  Visualized bowel loops are unremarkable.    Impression 1.  No renal masses.   2.  Indeterminate hepatic lesions with appearance favoring benign etiology.  Given history of hepatitis, liver protocol MRI recommended for characterization.      Electronically signed by: ALFREDO MACIAS MD  Date:     02/04/16  Time:    13:45            ASSESSMENT  58 y.o. Black or  female with:  1. HISTORY OF CHRONIC HEPATITIS C, GENOTYPE UNKNOWN  -- liver staging not known - no obvious evidence of advanced fibrosis  -- prior hx of Harvoni in 2015 w/ Dr Herbert - unclear if pt achieved SVR    2. LIVER LESIONS ON MRI 2016  -- unclear if this was followed up on by Dr Herbert        PLAN:  1. HCV RNA  2. Obtain outside records from Dr Herbert. Will review this before ordering any additional imaging to f/u on liver lesions or to stage liver disease to determine if long term f/u is needed

## 2018-05-18 ENCOUNTER — TELEPHONE (OUTPATIENT)
Dept: NEPHROLOGY | Facility: HOSPITAL | Age: 58
End: 2018-05-18

## 2018-05-18 DIAGNOSIS — N18.31 CKD STAGE G3A/A3, GFR 45-59 AND ALBUMIN CREATININE RATIO >300 MG/G: Primary | ICD-10-CM

## 2018-05-18 NOTE — PROGRESS NOTES
[facility]    The note from the fellow/resident was reviewed. I have personally interviewed and examined the patient. There were no additional findings with regards to the history or physical exam.    I agree with the assessment and plan of  Dr. Deborah Holguin

## 2018-05-18 NOTE — TELEPHONE ENCOUNTER
Labs reviewed. Serologies negative. UPCR with nephrotic range proteinuria. RFP with mild increase in sCr and mild hypokalemia.   As we just started a thiazide diuretic, will repeat RFP in 1 week to monitor K and Na.   Will order 24-hour urine protein:creatinine for accuracy and to have a baseline.   Called patient to discuss plan. She reports to be doing well. Swelling in legs has completely resolved with addition of thiazide. She is agreeable to repeating labs next week.     Deborah Holguin, PGY-5  Nephrology Fellow  [facility]  Pager: [phone]

## 2018-05-22 ENCOUNTER — TELEPHONE (OUTPATIENT)
Dept: NEPHROLOGY | Facility: CLINIC | Age: 58
End: 2018-05-22

## 2018-05-22 NOTE — TELEPHONE ENCOUNTER
Called pt and instructed her that dr rodriguez wants her to do blood work either Wednesday or Thursday. Pt choose Thursday and dr rodriguez wants pt to do a 24hrs urine and pt is coming after labs on thursday

## 2018-05-24 ENCOUNTER — LAB VISIT (OUTPATIENT)
Dept: LAB | Facility: HOSPITAL | Age: 58
End: 2018-05-24
Attending: INTERNAL MEDICINE
Payer: MEDICARE

## 2018-05-24 DIAGNOSIS — E11.59 TYPE 2 DIABETES MELLITUS WITH OTHER CIRCULATORY COMPLICATION, WITH LONG-TERM CURRENT USE OF INSULIN: ICD-10-CM

## 2018-05-24 DIAGNOSIS — R76.8 POSITIVE HEPATITIS C ANTIBODY TEST: ICD-10-CM

## 2018-05-24 DIAGNOSIS — Z79.4 TYPE 2 DIABETES MELLITUS WITH OTHER CIRCULATORY COMPLICATION, WITH LONG-TERM CURRENT USE OF INSULIN: ICD-10-CM

## 2018-05-24 DIAGNOSIS — N18.31 CKD STAGE G3A/A3, GFR 45-59 AND ALBUMIN CREATININE RATIO >300 MG/G: ICD-10-CM

## 2018-05-24 DIAGNOSIS — I10 ESSENTIAL HYPERTENSION: ICD-10-CM

## 2018-05-24 LAB
ALBUMIN SERPL BCP-MCNC: 3.3 G/DL
ANION GAP SERPL CALC-SCNC: 10 MMOL/L
BUN SERPL-MCNC: 32 MG/DL
CALCIUM SERPL-MCNC: 9.3 MG/DL
CHLORIDE SERPL-SCNC: 105 MMOL/L
CO2 SERPL-SCNC: 27 MMOL/L
CREAT SERPL-MCNC: 1.6 MG/DL
EST. GFR  (AFRICAN AMERICAN): 41.2 ML/MIN/1.73 M^2
EST. GFR  (NON AFRICAN AMERICAN): 35.8 ML/MIN/1.73 M^2
GLUCOSE SERPL-MCNC: 128 MG/DL
PHOSPHATE SERPL-MCNC: 4.3 MG/DL
POTASSIUM SERPL-SCNC: 3.5 MMOL/L
SODIUM SERPL-SCNC: 142 MMOL/L

## 2018-05-24 PROCEDURE — 80069 RENAL FUNCTION PANEL: CPT

## 2018-05-24 PROCEDURE — 36415 COLL VENOUS BLD VENIPUNCTURE: CPT

## 2018-05-25 ENCOUNTER — TELEPHONE (OUTPATIENT)
Dept: NEPHROLOGY | Facility: HOSPITAL | Age: 58
End: 2018-05-25

## 2018-05-25 DIAGNOSIS — Z79.4 TYPE 2 DIABETES MELLITUS WITH DIABETIC PERIPHERAL ANGIOPATHY WITHOUT GANGRENE, WITH LONG-TERM CURRENT USE OF INSULIN: Primary | ICD-10-CM

## 2018-05-25 DIAGNOSIS — E11.51 TYPE 2 DIABETES MELLITUS WITH DIABETIC PERIPHERAL ANGIOPATHY WITHOUT GANGRENE, WITH LONG-TERM CURRENT USE OF INSULIN: Primary | ICD-10-CM

## 2018-05-25 NOTE — TELEPHONE ENCOUNTER
Called to discuss results. Renal function stable. Na stable as well after starting thiazide. Patient reports BP better controlled.   Based on Na, K, HCO3, and BUN - encouraged patient to drink more water. Asked her to add 1-2 glasses of water to her current regimen.    Patient asked for refills on insulin needles. Will route note to PCP.       Deborah Holguin, PGY-5  Nephrology Fellow  [facility]  Pager: [phone]

## 2018-05-26 RX ORDER — PEN NEEDLE, DIABETIC 30 GX3/16"
NEEDLE, DISPOSABLE MISCELLANEOUS
Qty: 100 EACH | Refills: 3 | Status: SHIPPED | OUTPATIENT
Start: 2018-05-26 | End: 2019-11-08

## 2018-06-05 ENCOUNTER — LAB VISIT (OUTPATIENT)
Dept: LAB | Facility: OTHER | Age: 58
End: 2018-06-05
Payer: MEDICARE

## 2018-06-05 DIAGNOSIS — E89.0 POSTOPERATIVE HYPOTHYROIDISM: ICD-10-CM

## 2018-06-05 DIAGNOSIS — B18.2 CHRONIC HEPATITIS C WITHOUT HEPATIC COMA: ICD-10-CM

## 2018-06-05 LAB — TSH SERPL DL<=0.005 MIU/L-ACNC: 1.79 UIU/ML

## 2018-06-05 PROCEDURE — 87522 HEPATITIS C REVRS TRNSCRPJ: CPT

## 2018-06-05 PROCEDURE — 84443 ASSAY THYROID STIM HORMONE: CPT

## 2018-06-05 PROCEDURE — 36415 COLL VENOUS BLD VENIPUNCTURE: CPT

## 2018-06-06 RX ORDER — ERGOCALCIFEROL 1.25 MG/1
CAPSULE ORAL
Qty: 12 CAPSULE | Refills: 3 | Status: ON HOLD | OUTPATIENT
Start: 2018-06-06 | End: 2019-01-16

## 2018-06-07 ENCOUNTER — TELEPHONE (OUTPATIENT)
Dept: HEPATOLOGY | Facility: CLINIC | Age: 58
End: 2018-06-07

## 2018-06-07 DIAGNOSIS — Z86.19 HISTORY OF HEPATITIS C: Primary | ICD-10-CM

## 2018-06-07 LAB
HCV LOG: <1.08 LOG (10) IU/ML
HCV RNA QUANT PCR: <12 IU/ML
HCV, QUALITATIVE: NOT DETECTED IU/ML

## 2018-06-07 NOTE — TELEPHONE ENCOUNTER
pls call pt:  Outside records from Dr Herbert show the antonieta DID cure her HCV.  Current HCV testing is again negative.  She does NOT need future HCV testing.    I don't see that any additional testing was done to evaluate the spot seen on her liver on the MRI from 2/2016.  I also don't see that testing was done to determine the amount of scarring in her liver from the HCV before it was treated.   We still need to follow up on this.      Schedule FibroScan and BMP to be done now  We will likely need to get a CT scan but I need to see the above labs first.  We'll schedule a f/u visit after I know what imaging will be ordered.    thanks

## 2018-06-12 ENCOUNTER — PROCEDURE VISIT (OUTPATIENT)
Dept: HEPATOLOGY | Facility: CLINIC | Age: 58
End: 2018-06-12
Attending: PHYSICIAN ASSISTANT
Payer: MEDICARE

## 2018-06-12 ENCOUNTER — LAB VISIT (OUTPATIENT)
Dept: LAB | Facility: HOSPITAL | Age: 58
End: 2018-06-12
Attending: PHYSICIAN ASSISTANT
Payer: MEDICARE

## 2018-06-12 DIAGNOSIS — Z86.19 HISTORY OF HEPATITIS C: ICD-10-CM

## 2018-06-12 LAB
ANION GAP SERPL CALC-SCNC: 11 MMOL/L
BUN SERPL-MCNC: 42 MG/DL
CALCIUM SERPL-MCNC: 9.2 MG/DL
CHLORIDE SERPL-SCNC: 108 MMOL/L
CO2 SERPL-SCNC: 23 MMOL/L
CREAT SERPL-MCNC: 1.6 MG/DL
EST. GFR  (AFRICAN AMERICAN): 41.2 ML/MIN/1.73 M^2
EST. GFR  (NON AFRICAN AMERICAN): 35.8 ML/MIN/1.73 M^2
GLUCOSE SERPL-MCNC: 73 MG/DL
POTASSIUM SERPL-SCNC: 4.7 MMOL/L
SODIUM SERPL-SCNC: 142 MMOL/L

## 2018-06-12 PROCEDURE — 36415 COLL VENOUS BLD VENIPUNCTURE: CPT

## 2018-06-12 PROCEDURE — 80048 BASIC METABOLIC PNL TOTAL CA: CPT

## 2018-06-12 PROCEDURE — 91200 LIVER ELASTOGRAPHY: CPT | Mod: PBBFAC | Performed by: PHYSICIAN ASSISTANT

## 2018-06-12 PROCEDURE — 91200 LIVER ELASTOGRAPHY: CPT | Mod: 26,S$PBB,, | Performed by: PHYSICIAN ASSISTANT

## 2018-06-14 NOTE — PROCEDURES
Procedures     Fibroscan Procedure     Name: Kathie Quezada  Date of Procedure : 2018   :: Jennifer B Scheuermann, PA  Diagnosis: HCV    Probe: XL    Fibroscan readin.8 KPa    Fibrosis:F2     CAP readin dB/m    Steatosis: :<S1

## 2018-06-20 ENCOUNTER — TELEPHONE (OUTPATIENT)
Dept: HEPATOLOGY | Facility: CLINIC | Age: 58
End: 2018-06-20

## 2018-06-20 DIAGNOSIS — K76.9 LIVER LESION: ICD-10-CM

## 2018-06-20 DIAGNOSIS — K74.00 HEPATIC FIBROSIS: ICD-10-CM

## 2018-06-20 DIAGNOSIS — Z86.19 HISTORY OF HEPATITIS C: Primary | ICD-10-CM

## 2018-06-20 NOTE — TELEPHONE ENCOUNTER
----- Message from Omari Silverio MD sent at 6/12/2018  4:14 PM CDT -----  Yes.  Suggest AFP and CT    ----- Message -----  From: Jennifer B. Scheuermann, PA  Sent: 6/12/2018   1:55 PM  To: Omari Silverio MD    57 y/o BF w/ hx of HCV, s/p rx by outside MD w/ SVR 2016  Outside GI ordered imaging done at [facility]:  10/2015 - U/S and CT - no liver lesions  2/2016 MRI - partially evaluated liver w/ several nonspecific lesions, largest 2.6cm. Favors benign etiology but f/u recommended    Current fibroscan - F2    Do you think these lesions from prior  2016 MRI need further eval now?

## 2018-06-20 NOTE — TELEPHONE ENCOUNTER
Scheduled Ct scan and labs and follow up appt per Charlene and mailed out all appts to the pt today.

## 2018-06-20 NOTE — TELEPHONE ENCOUNTER
Spoke to pt  Reviewed plan for CT & AFP  Discussed importance of her staying VERY WELL HYDRATED for 3d before and after CT due to CKD and contrast that will be used.  Pt verbalized understanding. Will increase water during that time.

## 2018-06-20 NOTE — TELEPHONE ENCOUNTER
----- Message from Jennifer B. Scheuermann, PA sent at 6/20/2018  3:08 PM CDT -----  pls schedule tpCT and AFP  Needs f/u visit about a week after  thanks

## 2018-06-29 ENCOUNTER — HOSPITAL ENCOUNTER (OUTPATIENT)
Dept: RADIOLOGY | Facility: HOSPITAL | Age: 58
Discharge: HOME | End: 2018-06-29
Attending: INTERNAL MEDICINE
Payer: MEDICARE

## 2018-06-29 ENCOUNTER — HOSPITAL ENCOUNTER (OUTPATIENT)
Dept: RADIOLOGY | Facility: HOSPITAL | Age: 58
Discharge: HOME | End: 2018-06-29
Attending: PHYSICIAN ASSISTANT
Payer: MEDICARE

## 2018-06-29 DIAGNOSIS — K76.9 LIVER LESION: ICD-10-CM

## 2018-06-29 DIAGNOSIS — N18.31 CKD STAGE G3A/A3, GFR 45-59 AND ALBUMIN CREATININE RATIO >300 MG/G: ICD-10-CM

## 2018-06-29 LAB
CREAT SERPL-MCNC: 1.7 MG/DL (ref 0.5–1.4)
SAMPLE: ABNORMAL

## 2018-06-29 PROCEDURE — 76770 US EXAM ABDO BACK WALL COMP: CPT | Mod: 26,,, | Performed by: RADIOLOGY

## 2018-06-29 PROCEDURE — 74170 CT ABD WO CNTRST FLWD CNTRST: CPT | Mod: 26,,, | Performed by: RADIOLOGY

## 2018-06-29 PROCEDURE — 74170 CT ABD WO CNTRST FLWD CNTRST: CPT | Mod: TC

## 2018-06-29 PROCEDURE — 25500020 PHARM REV CODE 255: Performed by: PHYSICIAN ASSISTANT

## 2018-06-29 PROCEDURE — 76770 US EXAM ABDO BACK WALL COMP: CPT | Mod: TC

## 2018-06-29 RX ADMIN — IOHEXOL 100 ML: 350 INJECTION, SOLUTION INTRAVENOUS at 04:06

## 2018-07-12 ENCOUNTER — TELEPHONE (OUTPATIENT)
Dept: HEPATOLOGY | Facility: CLINIC | Age: 58
End: 2018-07-12

## 2018-07-12 NOTE — TELEPHONE ENCOUNTER
----- Message from Omari Silverio MD sent at 7/3/2018 10:05 AM CDT -----  Let's repeat once in 6 months only    ----- Message -----  From: Jennifer B. Scheuermann, PA  Sent: 7/2/2018   3:49 PM  To: Omari Silverio MD    57 y/o BF w/ hx of HCV, s/p rx by outside MD w/ SVR 2016  FibroScan - F2    Outside GI ordered imaging done at [facility]:  10/2015 - U/S and CT - no liver lesions  2/2016 MRI - partially evaluated liver w/ several nonspecific lesions, largest 2.6cm. Favors benign etiology but f/u recommended    We reviewed case and updated imaging was recommended:  Current tpCT 6/2018 - no liver lesions  AFP normal    Do you think she needs any additional eval to f/u on the liver lesion / old MRI

## 2018-07-13 ENCOUNTER — OFFICE VISIT (OUTPATIENT)
Dept: HEPATOLOGY | Facility: CLINIC | Age: 58
End: 2018-07-13
Payer: MEDICARE

## 2018-07-13 VITALS
BODY MASS INDEX: 35.03 KG/M2 | WEIGHT: 244.69 LBS | RESPIRATION RATE: 95 BRPM | TEMPERATURE: 98 F | HEART RATE: 82 BPM | DIASTOLIC BLOOD PRESSURE: 73 MMHG | OXYGEN SATURATION: 98 % | SYSTOLIC BLOOD PRESSURE: 148 MMHG | HEIGHT: 70 IN

## 2018-07-13 DIAGNOSIS — K74.00 HEPATIC FIBROSIS: ICD-10-CM

## 2018-07-13 DIAGNOSIS — Z86.19 HISTORY OF HEPATITIS C: Primary | ICD-10-CM

## 2018-07-13 DIAGNOSIS — K76.9 LIVER LESION: Primary | ICD-10-CM

## 2018-07-13 DIAGNOSIS — K76.9 LIVER LESION: ICD-10-CM

## 2018-07-13 PROCEDURE — 99214 OFFICE O/P EST MOD 30 MIN: CPT | Mod: PBBFAC | Performed by: PHYSICIAN ASSISTANT

## 2018-07-13 PROCEDURE — 99999 PR PBB SHADOW E&M-EST. PATIENT-LVL IV: CPT | Mod: PBBFAC,,, | Performed by: PHYSICIAN ASSISTANT

## 2018-07-13 PROCEDURE — 99213 OFFICE O/P EST LOW 20 MIN: CPT | Mod: S$PBB,,, | Performed by: PHYSICIAN ASSISTANT

## 2018-07-13 NOTE — PROGRESS NOTES
"HEPATOLOGY CLINIC VISIT NOTE - HCV clinic    CHIEF COMPLAINT: History of hepatitis C     HISTORY: This is a 58 y.o. Black or  female here for f/u regarding her HCV history and abnormal liver imaging.    Feels okay  Recent fall a week ago (tripped on a step) - skinned left lower leg and right rib pain w/ deep breath, laughing, sneezing.     Denies jaundice, dark urine, hematemesis, melena, slowed mentation, abdominal distention.     HCV History:  Pt reports being dx w/ HCV a few years ago  She was seen by Dr Herbert ([facility]) & treated w/ harvoni - unclear of 8 vs 12 wks duration  Had gallbladder problems towards end of treatment & required surgery. Does not think she ever had labs to assess for SVR w/ Dr Herbert    Since last visit pt has had:  - HCV RNA - neg 6/5/18 - confirms SVR(cure) of HCV. No further eval needed.  - FibroScan 6/12/18 - kPa 8.8 - F2  Routine labs support fibroscan findings      Abnormal liver imaging:  10/2015 U/S abd - hepatomegaly. No liver lesions  10/2015 CT abd w/o contrast - hepatomegaly. No liver lesions  2/2016 MRI abd - "Partially evaluated liver demonstrates several foci of ill-defined T2 hyperintensity and portal venous hyper enhancement in hepatic segment VI.  Largest focus measures approximately 2.6 cm in diameter"   Indeterminate, favors benign etiology    Since last visit pt has had:  6/29/18 tpCT - no liver lesions                    Past Medical History:   Diagnosis Date    CVA (cerebral vascular accident)     Diabetes mellitus, type 2     Factor XI deficiency     History of hepatitis C     s/p succesful Rx w/ Harvoni by Dr Herbert  - SVR12 (cure) 2016    Hyperlipidemia     Hypertension     Postoperative hypothyroidism        Past Surgical History:   Procedure Laterality Date    BACK SURGERY      COLONOSCOPY  11/13/2015    Dr Chino Herbert - good prep. internal hemorrhoids. diverticulosis of sigmois. NO polyps. repeat due 2025    galbladder      " HYSTERECTOMY      TONSILLECTOMY         FAMILY HISTORY: Negative for liver disease    SOCIAL HISTORY:   History   Smoking Status    Never Smoker   Smokeless Tobacco    Never Used     Alcohol - none  Drugs - none        ROS:   No fever, chills, weight loss, fatigue  No chest pain, palpitations, dyspnea, cough (+) sneezing  (+)pain over lower right rib cage  No abdominal pain, change in bowel pattern.   (+) easy bruising / bleeding (Factor XI deficiency)  No depression or anxiety      PHYSICAL EXAM:  Friendly Black or  female, in no acute distress; alert and oriented to person, place and time  VITALS: reviewed  HEENT: Sclerae anicteric.   NECK: Supple  CVS: Regular rate and rhythm. No murmurs  LUNGS: Normal respiratory effort. Clear bilaterally  CHEST: Tenderness w/ palpation overlying right lower rib cage  ABDOMEN: Flat, soft, nontender. No organomegaly or masses. No ascites or hernias. Good bowel sounds.    SKIN: Warm and dry. No jaundice, No obvious rashes.   NEURO/PSYCH: Normal gate. Memory intact. Thought and speech pattern appropriate. Behavior normal. No depression or anxiety noted.    RECENT LABS:  Lab Results   Component Value Date    WBC 10.21 04/09/2018    HGB 10.9 (L) 04/09/2018     04/09/2018     No results found for: INR  Lab Results   Component Value Date    AST 36 04/09/2018    ALT 25 04/09/2018    BILITOT 0.3 04/09/2018    ALBUMIN 3.3 (L) 05/24/2018    ALKPHOS 135 04/09/2018    CREATININE 1.6 (H) 06/12/2018    BUN 42 (H) 06/12/2018     06/12/2018    K 4.7 06/12/2018    AFP 8.0 06/29/2018         RECENT IMAGING:  Results for orders placed during the hospital encounter of 06/29/18   CT Abdomen With Without Contrast    Narrative EXAMINATION:  CT ABDOMEN W WO CONTRAST    CLINICAL HISTORY:  Liver lesion, >1cm, liver disease with risk of hcc; Liver disease, unspecified    TECHNIQUE:  Low dose axial images, sagittal and coronal reformations were obtained from the lung bases to  the iliac crest before and after the IV administration of 100 mL of Omnipaque 350  and the oral administration of 30 mL of Omnipaque 350.  Non- contrast, arterial, portal venous, and delayed phases were acquired over the abdomen.    COMPARISON:  MRI abdomen 02/04/2016, CT 10/20/2015    FINDINGS:  Heart: Normal in size. No pericardial effusion.    Lung Bases: There is minor platelike atelectasis at the left lung base.  There is no pulmonary nodule, mass, pleural effusion or pleural thickening.  There is and upper limit of normal size 0.8 cm pre cardiac lymph node which is nonspecific.  No pathologic lymphadenopathy seen in the visualized portions of the chest.    Liver: Liver is normal in size with homogeneous attenuation.  There are no focal liver abnormalities.  There is no focal abnormality to correspond to the abnormal finding in segment VI on MRI abdomen 02/04/2016.    Gallbladder: The gallbladder is surgically removed.    Bile Ducts: Common bile duct measures up to 0.6 cm.  There is no intrahepatic biliary ductal dilatation.    Pancreas: No mass or peripancreatic fat stranding.    Spleen: Unremarkable.    Adrenals: Unremarkable.    Kidneys/ Ureters: Kidneys are normal size.  The visualized portions of the right ureter are mildly dilated with the distal extent of the right ureter not included on this limited abdominal study.  There is no hydronephrosis, solid renal mass, or nephrolithiasis..    GI Tract/Mesentery: There are several colonic diverticula without evidence of acute diverticulitis.  No evidence of bowel obstruction or inflammation.    Peritoneal Space: No ascites. No free air.    Retroperitoneum: There are few upper limit of normal sized periaortic lymph nodes.  no significant adenopathy.    Abdominal wall: There is a small fat containing umbilical hernia.  There is nonspecific soft tissue fat stranding in the left flank.    Vasculature: There is mild calcific atherosclerosis of the aorta without  aneurysm..    Bones: There is a severe compression deformity at T12 status post vertebroplasty, new from CT 10/20/2015.  There are degenerative changes.  No acute fracture.      Impression No focal liver abnormalities.    Mildly dilated right ureter without hydronephrosis.  Distal extent of the right ureter is not included in the field of view on this limited abdominal study.    Colonic diverticulosis without evidence of acute diverticulitis.    Cholecystectomy.    Severe T12 compression deformity status post vertebroplasty.    Additional findings as detailed above.    Electronically signed by resident: Major Ochoa  Date:    06/29/2018  Time:    16:44    Electronically signed by: Ishmael Pérez MD  Date:    06/29/2018  Time:    17:13             ASSESSMENT  58 y.o. Black or  female with:  1. HISTORY OF CHRONIC HEPATITIS C, s/p successful treatment  -- prior hx of Harvoni in 2015 w/ Dr Herbert w/ SVR (cure)  -- FibroScan 6/2018 - F2    2. LIVER LESIONS ON MRI 2016  -- appeared benign on MRI  -- not noted on U/S at that time or current tpCT    3. RECENT FALL  -- possible rib fracture vs bruised ribs.  -- advised to see PCP if continued pain        PLAN:  1. MRI abdomen, AFP, CMP 12/2018  Review by phone

## 2018-07-16 ENCOUNTER — TELEPHONE (OUTPATIENT)
Dept: HEPATOLOGY | Facility: CLINIC | Age: 58
End: 2018-07-16

## 2018-07-16 NOTE — TELEPHONE ENCOUNTER
----- Message from Jennifer B. Scheuermann, PA sent at 7/13/2018  3:47 PM CDT -----  Pt actually needs MRI abd along w/ AFP and CMP in 12/2018

## 2018-08-23 ENCOUNTER — OFFICE VISIT (OUTPATIENT)
Dept: NEPHROLOGY | Facility: CLINIC | Age: 58
End: 2018-08-23
Payer: MEDICARE

## 2018-08-23 ENCOUNTER — LAB VISIT (OUTPATIENT)
Dept: LAB | Facility: HOSPITAL | Age: 58
End: 2018-08-23
Attending: INTERNAL MEDICINE
Payer: MEDICARE

## 2018-08-23 VITALS
HEIGHT: 70 IN | WEIGHT: 242.5 LBS | HEART RATE: 74 BPM | SYSTOLIC BLOOD PRESSURE: 130 MMHG | DIASTOLIC BLOOD PRESSURE: 70 MMHG | BODY MASS INDEX: 34.72 KG/M2 | OXYGEN SATURATION: 99 %

## 2018-08-23 DIAGNOSIS — Z79.4 TYPE 2 DIABETES MELLITUS WITH DIABETIC PERIPHERAL ANGIOPATHY WITHOUT GANGRENE, WITH LONG-TERM CURRENT USE OF INSULIN: ICD-10-CM

## 2018-08-23 DIAGNOSIS — E11.51 TYPE 2 DIABETES MELLITUS WITH DIABETIC PERIPHERAL ANGIOPATHY WITHOUT GANGRENE, WITH LONG-TERM CURRENT USE OF INSULIN: ICD-10-CM

## 2018-08-23 DIAGNOSIS — N18.31 CKD STAGE G3A/A3, GFR 45-59 AND ALBUMIN CREATININE RATIO >300 MG/G: ICD-10-CM

## 2018-08-23 DIAGNOSIS — Z86.19 HISTORY OF HEPATITIS C: ICD-10-CM

## 2018-08-23 DIAGNOSIS — N18.30 CHRONIC KIDNEY DISEASE, STAGE III (MODERATE): Primary | ICD-10-CM

## 2018-08-23 DIAGNOSIS — N18.30 CHRONIC KIDNEY DISEASE, STAGE III (MODERATE): ICD-10-CM

## 2018-08-23 DIAGNOSIS — I10 ESSENTIAL HYPERTENSION: ICD-10-CM

## 2018-08-23 LAB
25(OH)D3+25(OH)D2 SERPL-MCNC: 23 NG/ML
ALBUMIN SERPL BCP-MCNC: 3.5 G/DL
ANION GAP SERPL CALC-SCNC: 13 MMOL/L
BASOPHILS # BLD AUTO: 0.06 K/UL
BASOPHILS NFR BLD: 0.6 %
BUN SERPL-MCNC: 32 MG/DL
CALCIUM SERPL-MCNC: 9.1 MG/DL
CHLORIDE SERPL-SCNC: 106 MMOL/L
CO2 SERPL-SCNC: 22 MMOL/L
CREAT SERPL-MCNC: 1.7 MG/DL
DIFFERENTIAL METHOD: ABNORMAL
EOSINOPHIL # BLD AUTO: 0.2 K/UL
EOSINOPHIL NFR BLD: 1.6 %
ERYTHROCYTE [DISTWIDTH] IN BLOOD BY AUTOMATED COUNT: 14.2 %
EST. GFR  (AFRICAN AMERICAN): 38.3 ML/MIN/1.73 M^2
EST. GFR  (NON AFRICAN AMERICAN): 33.2 ML/MIN/1.73 M^2
GLUCOSE SERPL-MCNC: 115 MG/DL
HCT VFR BLD AUTO: 36.4 %
HGB BLD-MCNC: 12 G/DL
IMM GRANULOCYTES # BLD AUTO: 0.13 K/UL
IMM GRANULOCYTES NFR BLD AUTO: 1.2 %
LYMPHOCYTES # BLD AUTO: 2.3 K/UL
LYMPHOCYTES NFR BLD: 21.7 %
MCH RBC QN AUTO: 28.1 PG
MCHC RBC AUTO-ENTMCNC: 33 G/DL
MCV RBC AUTO: 85 FL
MONOCYTES # BLD AUTO: 0.5 K/UL
MONOCYTES NFR BLD: 5 %
NEUTROPHILS # BLD AUTO: 7.4 K/UL
NEUTROPHILS NFR BLD: 69.9 %
NRBC BLD-RTO: 0 /100 WBC
PHOSPHATE SERPL-MCNC: 3.6 MG/DL
PLATELET # BLD AUTO: 156 K/UL
PMV BLD AUTO: 11.6 FL
POTASSIUM SERPL-SCNC: 4 MMOL/L
PTH-INTACT SERPL-MCNC: 168 PG/ML
RBC # BLD AUTO: 4.27 M/UL
SODIUM SERPL-SCNC: 141 MMOL/L
WBC # BLD AUTO: 10.56 K/UL

## 2018-08-23 PROCEDURE — 83970 ASSAY OF PARATHORMONE: CPT

## 2018-08-23 PROCEDURE — 80069 RENAL FUNCTION PANEL: CPT

## 2018-08-23 PROCEDURE — 99213 OFFICE O/P EST LOW 20 MIN: CPT | Mod: PBBFAC | Performed by: INTERNAL MEDICINE

## 2018-08-23 PROCEDURE — 99999 PR PBB SHADOW E&M-EST. PATIENT-LVL III: CPT | Mod: PBBFAC,,, | Performed by: INTERNAL MEDICINE

## 2018-08-23 PROCEDURE — 36415 COLL VENOUS BLD VENIPUNCTURE: CPT

## 2018-08-23 PROCEDURE — 82306 VITAMIN D 25 HYDROXY: CPT

## 2018-08-23 PROCEDURE — 99214 OFFICE O/P EST MOD 30 MIN: CPT | Mod: S$PBB,,, | Performed by: INTERNAL MEDICINE

## 2018-08-23 PROCEDURE — 85025 COMPLETE CBC W/AUTO DIFF WBC: CPT

## 2018-08-23 NOTE — PROGRESS NOTES
Subjective:       Patient ID: Kathie Quezada is a 58 y.o. Black or  female who presents for new evaluation of No chief complaint on file.    HPI   Ms. Quezada is a 57 yo AAF with HTN, T2DM, and +HCV Ab who was referred to nephrology by her endocrinologist for evaluation of proteinuria and decreased eGFR. She has recently transferred her care to [facility] from [facility]. Chart review reveals only two data points: sCr 0.8 in 2015 and sCr 1.3 (with eGFR 53) in 4/2018. Similarly, ACR was 47.8 in 2006 and most recently 2788 on 4/2018.     She was diagnosed with T2DM prior to 2005. She has been on insulin since ~2008. Diabetes has been decently controlled with highest A1c in the 7-8s. Most recent A1c was 5.2%. Last eye exam on 5/2/18 with bilateral diabetic retinopathy. She was initially diagnosed with this in 2016.   She was diagnosed with HTN in the 1980s. BP has been controlled on-and-off; reports a h/o dietary non-compliance which is no improved. +h/o hospitalization for HTN emergency; + h/o CVA.   She has a h/o HCV Ab+. She completed Harvoni treatment in 2015.   She denies personal h/o kidney stones, recurrent UTIs, or significant NSAID use. She denies a family h/o kidney disease or ESRD.     Interval Hx  Doing ok other than left flank pain and burning when urinating. No frequency, hematuria. She denies any chest pain, shortness of breath or lower extremity pain on mild to moderate activity. Her appetite is good and denies any nausea, vomiting, diarrhea, abdominal pain, melena or bright red bleeding per rectum. Her bowel movements are regular and her weight is stable. BP stable. Denies NSAIDs.     Past Medical History:   Diagnosis Date    CVA (cerebral vascular accident)     Diabetes mellitus, type 2     Factor XI deficiency     History of hepatitis C     s/p succesful Rx w/ Harvoni by Dr Herbert  - SVR12 (cure) 2016    Hyperlipidemia     Hypertension     Postoperative hypothyroidism      Past  Surgical History:   Procedure Laterality Date    BACK SURGERY      COLONOSCOPY  11/13/2015    Dr Chino torres prep. internal hemorrhoids. diverticulosis of sigmois. NO polyps. repeat due 2025    galbladder      HYSTERECTOMY      TONSILLECTOMY       Family History   Problem Relation Age of Onset    Diabetes type II Mother      Social History     Socioeconomic History    Marital status:      Spouse name: None    Number of children: None    Years of education: None    Highest education level: None   Social Needs    Financial resource strain: None    Food insecurity - worry: None    Food insecurity - inability: None    Transportation needs - medical: None    Transportation needs - non-medical: None   Occupational History    None   Tobacco Use    Smoking status: Never Smoker    Smokeless tobacco: Never Used   Substance and Sexual Activity    Alcohol use: Yes    Drug use: None    Sexual activity: None   Other Topics Concern    None   Social History Narrative    None         Review of Systems    Const: no fevers or chills  Endo: stays cold all the time. +polydipsia  Eyes: +vision changes and blurry vision; holding off on laser surgery  HENT: no sore throat. +rhinorrhea and hoarseness ever since thyroid surgery  Heme: +easy bleeding/bruising 2/2 factor XI deficiency  CV: no chest pain. +BLE edema that started a few months ago (amlodipine started around same time)  Resp: no SOB or cough  GI: +diarrhea; no constipation  : +polyuria. +nocturia. No dysuria or hematuria.   Neuro: + neuropathy in bilateral feet; rarely in hands.   Skin: no new rashes or lesions; +dry skin      Objective:       Current Outpatient Medications:     atorvastatin (LIPITOR) 10 MG tablet, TK 1 T PO QHS, Disp: , Rfl: 1    carvedilol (COREG) 25 MG tablet, Take 25 mg by mouth 2 (two) times daily with meals. , Disp: , Rfl:     chlorthalidone (HYGROTEN) 25 MG Tab, Take 1 tablet (25 mg total) by mouth once daily.,  "Disp: 30 tablet, Rfl: 11    ergocalciferol (ERGOCALCIFEROL) 50,000 unit Cap, TAKE ONE CAPSULE BY MOUTH DAILY FOR FIVE DAYS, THEN ONE CAPSULE EVERY WEEK FOR SEVEN WEEKS, Disp: 12 capsule, Rfl: 3    LANTUS SOLOSTAR U-100 INSULIN 100 unit/mL (3 mL) InPn pen, Inject 20 Units into the skin once daily., Disp: , Rfl: 1    lisinopril (PRINIVIL,ZESTRIL) 40 MG tablet, Take 40 mg by mouth once daily., Disp: , Rfl:     pen needle, diabetic (BD ULTRA-FINE OLGA LIDIA PEN NEEDLE) 32 gauge x 5/32" Ndle, To use with Lantus solostar insulin pens, Disp: 100 each, Rfl: 3    SYNTHROID 200 mcg tablet, Take 1 tablet (200 mcg total) by mouth once daily. BRAND NAME ONLY, Disp: 30 tablet, Rfl: 11    traZODone (DESYREL) 50 MG tablet, Take 50 mg by mouth once daily., Disp: , Rfl: 0    Physical Exam    Blood pressure 130/70, pulse 74, height 5' 10" (1.778 m), weight 110 kg (242 lb 8.1 oz), SpO2 99 %.  General: WDWN AAF in NAD.  Eyes: EOMI, clear conjunctivae  HENT: NC/AT. MMM.  Neck: supple, no thyromegaly  Lymph: no cervical or supraclavicular LAD  CV: RRR. BLE with 1+ pitting edema and chronic venous stasis changes  Resp: Decreased breath sounds. Normal effort. CTAB, no crackles.   Abd: Soft, NTND  Skin: warm, normal turgor. No visible rash  Neuro: AAO. No focal deficits      Assessment:       1. Chronic kidney disease, stage III (moderate)    2. CKD stage G3a/A3, GFR 45-59 and albumin creatinine ratio >300 mg/g    3. Essential hypertension    4. History of hepatitis C    5. Type 2 diabetes mellitus with diabetic peripheral angiopathy without gangrene, with long-term current use of insulin        Plan:   1. CKD:   - unsure baseline renal function; most recent sCr consistent with CKD stage 3  - proteinuria present since at least 2006; nephrotic range.  - most likely related to diabetic nephropathy; especially given diabetic retinopathy. Cannot r/o contribution from HTN given significant history of HTN-related complications.   - will repeat RFP " today to help establish baseline GFR  - UA and UPCR today.   - renal US reviewed   - HTN: goal BP < 130/80       Lab Results   Component Value Date    CREATININE 1.6 (H) 06/12/2018       Protein Creatinine Ratios: see above. Continue max dose lisinopril. Educated patient about low salt diet. Will repeat.     Prot/Creat Ratio, Ur   Date Value Ref Range Status   05/16/2018 4.54 (H) 0.00 - 0.20 Final       Acid-Base: wnl.   Lab Results   Component Value Date     06/12/2018    K 4.7 06/12/2018    CO2 23 06/12/2018     2. HTN: Blood pressures stable    3. Renal osteodystrophy: currently with CKD stage 3. Will re-evaluate upon progression of disease.   Lab Results   Component Value Date    CALCIUM 9.2 06/12/2018    PHOS 4.3 05/24/2018       4. Anemia: unlikely to be renal in origin given CKD stage 3a. However in setting of anemia and proteinuria, will order ALCIDES and FLC. See above.   Lab Results   Component Value Date    HGB 10.9 (L) 04/09/2018        5. DM:  Last HbA1C controlled. Continue current management.   Lab Results   Component Value Date    HGBA1C 5.2 04/09/2018       6. Lipid management: would benefit from high intensity statin; will defer to PCP.   Lab Results   Component Value Date    LDLCALC 97.4 04/09/2018       RTC in 6 months with labs.

## 2018-08-28 ENCOUNTER — TELEPHONE (OUTPATIENT)
Dept: ENDOCRINOLOGY | Facility: CLINIC | Age: 58
End: 2018-08-28

## 2018-08-28 NOTE — TELEPHONE ENCOUNTER
----- Message from Antonella Felder sent at 8/28/2018  8:26 AM CDT -----  Contact: self  Pt is calling in regards to scheduling an appt. Books aren't currently open to schedule an appt.    Pt would like a call back at [phone].    Thank you

## 2018-09-25 ENCOUNTER — TELEPHONE (OUTPATIENT)
Dept: NEPHROLOGY | Facility: CLINIC | Age: 58
End: 2018-09-25

## 2018-09-25 DIAGNOSIS — N18.30 CHRONIC KIDNEY DISEASE, STAGE III (MODERATE): Primary | ICD-10-CM

## 2018-09-25 NOTE — TELEPHONE ENCOUNTER
----- Message from Leonard Goldsmith MD sent at 9/25/2018  2:54 PM CDT -----  Contact: Self  Can you tell her that urine studies showed no infection, her protein in the urine is stable and she needs f/u in 6 months with renal function panel, cbc, ua and upc ration.   Thanks.     ----- Message -----  From: Soheila Navarro LPN  Sent: 9/25/2018  10:20 AM  To: Leonard Goldsmith MD        ----- Message -----  From: Emani Lozano  Sent: 9/25/2018   8:46 AM  To: [facility] Staff    .Test Results    Type of Test:  Urine test  Date of Test:  8/23  Communication Preference: [phone]  Additional Information:  Pt would like to know the results- (any metal in her urine) & if she needs a FU lab.

## 2018-10-17 NOTE — PROGRESS NOTES
INTERNAL MEDICINE CLINIC - SAME DAY APPOINTMENT  Progress Note    PRESENTING HISTORY     PCP: No primary care provider on file.  Chief Complaint/Reason for Visit:   No chief complaint on file.      History of Present Illness & ROS : Ms. Kathie Quezada is a 58 y.o. female.      Ms. Quezada presents to  clinic today, reporting that was recently seen at [facility] and admitted for a GIB. No records to review.   Since most recent discharge, does not endorse recurrent bleeding per rectum.   Denies headaches, dizziness, change in vision, sob (resting or with mobility), chest discomforts, abd pain, N/V or constipation.   She is requesting to establish care with our team of providers.     She denies undergoing blood or blood product transfusions while admitted, and was not scoped (upper or lower).       Review of Systems:  Eyes: denies visual changes at this time denies floaters   ENT: no nasal congestion or sore throat  Respiratory: no cough or shorness of breath  Cardiovascular: no chest pain or palpitations  Gastrointestinal: no nausea or vomiting, no abdominal pain or change in bowel habits  Genitourinary: no hematuria or dysuria; denies frequency  Hematologic/Lymphatic: no easy bruising or lymphadenopathy  Musculoskeletal: no arthralgias or myalgias  Neurological: no seizures or tremors  Endocrine: no heat or cold intolerance      PAST HISTORY:     Past Medical History:   Diagnosis Date    CVA (cerebral vascular accident)     Diabetes mellitus, type 2     Factor XI deficiency     History of hepatitis C     s/p succesful Rx w/ Harvoni by Dr Herbert  - SVR12 (cure) 2016    Hyperlipidemia     Hypertension     Postoperative hypothyroidism        Past Surgical History:   Procedure Laterality Date    BACK SURGERY      COLONOSCOPY  11/13/2015    Dr Chino Herbert - [facility] prep. internal hemorrhoids. diverticulosis of sigmois. NO polyps. repeat due 2025    galbladder      HYSTERECTOMY      TONSILLECTOMY    "      Family History   Problem Relation Age of Onset    Diabetes type II Mother        Social History     Socioeconomic History    Marital status:      Spouse name: Not on file    Number of children: Not on file    Years of education: Not on file    Highest education level: Not on file   Social Needs    Financial resource strain: Not on file    Food insecurity - worry: Not on file    Food insecurity - inability: Not on file    Transportation needs - medical: Not on file    Transportation needs - non-medical: Not on file   Occupational History    Not on file   Tobacco Use    Smoking status: Never Smoker    Smokeless tobacco: Never Used   Substance and Sexual Activity    Alcohol use: Yes    Drug use: Not on file    Sexual activity: Not on file   Other Topics Concern    Not on file   Social History Narrative    Not on file       MEDICATIONS & ALLERGIES:     Current Outpatient Medications on File Prior to Visit   Medication Sig Dispense Refill    atorvastatin (LIPITOR) 10 MG tablet TK 1 T PO QHS  1    carvedilol (COREG) 25 MG tablet Take 25 mg by mouth 2 (two) times daily with meals.       chlorthalidone (HYGROTEN) 25 MG Tab Take 1 tablet (25 mg total) by mouth once daily. 30 tablet 11    ergocalciferol (ERGOCALCIFEROL) 50,000 unit Cap TAKE ONE CAPSULE BY MOUTH DAILY FOR FIVE DAYS, THEN ONE CAPSULE EVERY WEEK FOR SEVEN WEEKS 12 capsule 3    LANTUS SOLOSTAR U-100 INSULIN 100 unit/mL (3 mL) InPn pen Inject 20 Units into the skin once daily.  1    lisinopril (PRINIVIL,ZESTRIL) 40 MG tablet Take 40 mg by mouth once daily.      pen needle, diabetic (BD ULTRA-FINE OLGA LIDIA PEN NEEDLE) 32 gauge x 5/32" Ndle To use with Lantus solostar insulin pens 100 each 3    SYNTHROID 200 mcg tablet Take 1 tablet (200 mcg total) by mouth once daily. BRAND NAME ONLY 30 tablet 11    traZODone (DESYREL) 50 MG tablet Take 50 mg by mouth once daily.  0     No current facility-administered medications on file prior " to visit.         Review of patient's allergies indicates:   Allergen Reactions    Bactrim [sulfamethoxazole-trimethoprim] Hives    Codeine Hives    Penicillins Hives    Shellfish containing products Itching    Sulfa (sulfonamide antibiotics) Hives       Medications Reconciliation:   I have reconciled the patient's home medications with the patient/family. I have updated all changes.  Refer to After-Visit Medication List.    OBJECTIVE:     Vital Signs:  There were no vitals filed for this visit.  Wt Readings from Last 1 Encounters:   08/23/18 0930 110 kg (242 lb 8.1 oz)     There is no height or weight on file to calculate BMI.   Wt Readings from Last 3 Encounters:   10/18/18 112.8 kg (248 lb 10.9 oz)   08/23/18 110 kg (242 lb 8.1 oz)   07/13/18 111 kg (244 lb 11.4 oz)     Temp Readings from Last 3 Encounters:   07/13/18 97.6 °F (36.4 °C) (Oral)   05/16/18 97.9 °F (36.6 °C) (Oral)     BP Readings from Last 3 Encounters:   10/18/18 (!) 138/90   08/23/18 130/70   07/13/18 (!) 148/73     Pulse Readings from Last 3 Encounters:   10/18/18 83   08/23/18 74   07/13/18 82         Physical Exam:  General: Well developed, well nourished. No distress.  HEENT: Head is normocephalic, atraumatic; ears are normal.   Eyes: Clear conjunctiva.  Neck: Supple, symmetrical neck; trachea midline.  Lungs: Clear to auscultation bilaterally and normal respiratory effort.  Cardiovascular: Heart with regular rate and rhythm. No murmurs, gallops or rubs  Extremities: No LE edema. Pulses 2+ and symmetric.   Abdomen: Abdomen is soft, non-tender non-distended with normal bowel sounds.  Skin: Skin color, texture, turgor normal. No rashes.  Musculoskeletal: Normal gait.   Lymph Nodes: No cervical or supraclavicular adenopathy.  Neurologic: Normal strength and tone. No focal numbness or weakness.   Psychiatric: Not depressed.        Laboratory  Lab Results   Component Value Date    WBC 10.56 08/23/2018    HGB 12.0 08/23/2018    HCT 36.4 (L)  "08/23/2018     08/23/2018    CHOL 209 (H) 04/09/2018    TRIG 173 (H) 04/09/2018    HDL 77 (H) 04/09/2018    ALT 25 04/09/2018    AST 36 04/09/2018     08/23/2018    K 4.0 08/23/2018     08/23/2018    CREATININE 1.7 (H) 08/23/2018    BUN 32 (H) 08/23/2018    CO2 22 (L) 08/23/2018    TSH 1.790 06/05/2018    HGBA1C 5.2 04/09/2018         ASSESSMENT & PLAN:     Here for UC Visit, but reports "here for ER follow up from [facility]"; discharge forms reviewed.   Wants to est with new PCP.       Recent [facility] Admission for GIB, that has now resolved, no records from this admission; patient is her for follow up and wants to establish with our clinic Providers:   Today:   - check CBC, INR (h/o Hep C)  - amb referral to GI (needs EGD and C Scope, > 50, reports that had one done at [facility], informed "normal" (this provider has no record of this and have requested the Medical Records to be faxed to our clinic;  Release of Med Information has been obtained)  - start daily PPI therapy until seen by our GI experts        PAST Medical History Reviewed:     Hypertension:   *Goal: < 130/90  Today: 138/90   - continue Coreg 25 / 25 (taking)  - continue Hygroten 25 (taking)  - continue Lisinopril 40 (taking)        Diabetes Mellitus (II):   Most recent A1c: 5.2 %( 4/2018), reflective of optimal OP control   Home Range: not monitoring due to inability to afford the machine and strips; have arranged, and spoken with, our Diabetic Educator (ESTELLA Aaron, ALIVIA); apt on 10/19  - continue Basaglar 10 units daily   - check A1c today  - check Lipid Panel today   *Has had an eye exam at [facility] in 2018  *On Lisinopril and Lipitor       Hypothyroidism:   Lab Results   Component Value Date    TSH 1.790 06/05/2018   - continue current dose of thyroid hormone replacement   - check TSH today       Chronic Hepatitis C, s/p Harvoni 2015  Keep follow ups with Hepatology as they have advised      CKD III:   Baseline: 1.7-1.9  - follow up with " Nephrology as est  - check CMP today       Hyperlipidemia:   Lab Results   Component Value Date    CHOL 209 (H) 04/09/2018     Lab Results   Component Value Date    HDL 77 (H) 04/09/2018     Lab Results   Component Value Date    LDLCALC 97.4 04/09/2018     Lab Results   Component Value Date    TRIG 173 (H) 04/09/2018     Lab Results   Component Value Date    CHOLHDL 36.8 04/09/2018   - continue current dose of statin therapy   - check Lipid Panel today         Vitamin D Deficiency:   Noted Vit D level of 23 in 8/2018; suggest repeat level in 12 months.   - continue Vitamin D supplement       History of Factor XI Deficiency         Preventative Health:  - Labs (A1c, Vitamin D, CMP, CBC, INR, TSH)  - Mammogram (order placed)   - Colonoscopy (sending to Gi in the setting of recent GIB)       Immunizations:   Per AcIP Guidelines, she is considered high risk and should begin the series, regardless of age.   PPSV 23: today, will need the Prevnar 13 to be up to date for the next 5 years.     TDAP: today     Flu: refuses       Future Appointments   Date Time Provider Department Center   10/18/2018 12:30 PM [facility] MAMMO8 SCREEN INT [facility] MAMID Cj delano [facility]   10/19/2018 10:00 AM DIABETES EDUCATOR, INT MED 1 [facility] INTLDIA Cj Dill [facility]   12/27/2018  2:30 PM LAB, APPOINTMENT [facility] LAB  Cj Dill [facility]   12/27/2018  3:30 PM [facility] OIC-MRI1 [facility] MRI IC Imaging [facility]tr   1/18/2019  9:00 AM WHITNEY Lombardo [facility]        Medication List           Accurate as of 10/18/18 11:36 AM. If you have any questions, ask your nurse or doctor.               START taking these medications    pantoprazole 40 MG tablet  Commonly known as:  PROTONIX  Take 1 tablet (40 mg total) by mouth once daily.  Started by:  WHITNEY Jaquez        CHANGE how you take these medications    insulin glargine 100 unit/mL (3 mL) Inpn pen  Commonly known as:  BASAGLAR KWIKPEN U-100 INSULIN  Inject 10 Units into the skin once  "daily.  What changed:  how much to take  Changed by:  WHITNEY Jaquez        CONTINUE taking these medications    atorvastatin 10 MG tablet  Commonly known as:  LIPITOR     carvedilol 25 MG tablet  Commonly known as:  COREG     chlorthalidone 25 MG Tab  Commonly known as:  HYGROTEN  Take 1 tablet (25 mg total) by mouth once daily.     ergocalciferol 50,000 unit Cap  Commonly known as:  ERGOCALCIFEROL  TAKE ONE CAPSULE BY MOUTH DAILY FOR FIVE DAYS, THEN ONE CAPSULE EVERY WEEK FOR SEVEN WEEKS     lisinopril 40 MG tablet  Commonly known as:  PRINIVIL,ZESTRIL     pen needle, diabetic 32 gauge x 5/32" Ndle  Commonly known as:  BD ULTRA-FINE OLGA LIDIA PEN NEEDLE  To use with Lantus solostar insulin pens     SYNTHROID 200 MCG tablet  Generic drug:  levothyroxine  Take 1 tablet (200 mcg total) by mouth once daily. BRAND NAME ONLY     traZODone 50 MG tablet  Commonly known as:  DESYREL           Where to Get Your Medications      These medications were sent to [facility] [address] [place], [place] [address]VD AT [address] [address]    Phone:  [phone]   · pantoprazole 40 MG tablet     Information about where to get these medications is not yet available    Ask your nurse or doctor about these medications  · insulin glargine 100 unit/mL (3 mL) Inpn pen         Signing Physician:  WHITNEY Jaquez  "

## 2018-10-18 ENCOUNTER — TELEPHONE (OUTPATIENT)
Dept: INTERNAL MEDICINE | Facility: CLINIC | Age: 58
End: 2018-10-18

## 2018-10-18 ENCOUNTER — IMMUNIZATION (OUTPATIENT)
Dept: PHARMACY | Facility: CLINIC | Age: 58
End: 2018-10-18
Payer: MEDICARE

## 2018-10-18 ENCOUNTER — OFFICE VISIT (OUTPATIENT)
Dept: INTERNAL MEDICINE | Facility: CLINIC | Age: 58
End: 2018-10-18
Payer: MEDICARE

## 2018-10-18 ENCOUNTER — HOSPITAL ENCOUNTER (OUTPATIENT)
Dept: RADIOLOGY | Facility: HOSPITAL | Age: 58
Discharge: HOME | End: 2018-10-18
Attending: NURSE PRACTITIONER
Payer: MEDICARE

## 2018-10-18 VITALS
OXYGEN SATURATION: 93 % | DIASTOLIC BLOOD PRESSURE: 90 MMHG | HEIGHT: 70 IN | WEIGHT: 248.69 LBS | HEART RATE: 83 BPM | SYSTOLIC BLOOD PRESSURE: 138 MMHG | BODY MASS INDEX: 35.6 KG/M2

## 2018-10-18 DIAGNOSIS — K92.2 GASTROINTESTINAL HEMORRHAGE, UNSPECIFIED GASTROINTESTINAL HEMORRHAGE TYPE: Primary | ICD-10-CM

## 2018-10-18 DIAGNOSIS — E78.2 MIXED HYPERLIPIDEMIA: ICD-10-CM

## 2018-10-18 DIAGNOSIS — Z76.89 ESTABLISHING CARE WITH NEW DOCTOR, ENCOUNTER FOR: ICD-10-CM

## 2018-10-18 DIAGNOSIS — Z79.4 TYPE 2 DIABETES MELLITUS WITH DIABETIC PERIPHERAL ANGIOPATHY WITHOUT GANGRENE, WITH LONG-TERM CURRENT USE OF INSULIN: ICD-10-CM

## 2018-10-18 DIAGNOSIS — E11.51 TYPE 2 DIABETES MELLITUS WITH DIABETIC PERIPHERAL ANGIOPATHY WITHOUT GANGRENE, WITH LONG-TERM CURRENT USE OF INSULIN: ICD-10-CM

## 2018-10-18 DIAGNOSIS — I10 ESSENTIAL HYPERTENSION: ICD-10-CM

## 2018-10-18 DIAGNOSIS — Z12.39 SCREENING BREAST EXAMINATION: ICD-10-CM

## 2018-10-18 DIAGNOSIS — Z86.19 HISTORY OF HEPATITIS C: ICD-10-CM

## 2018-10-18 DIAGNOSIS — Z12.31 VISIT FOR SCREENING MAMMOGRAM: ICD-10-CM

## 2018-10-18 DIAGNOSIS — Z00.00 ENCOUNTER FOR PREVENTATIVE ADULT HEALTH CARE EXAMINATION: ICD-10-CM

## 2018-10-18 PROCEDURE — 77067 SCR MAMMO BI INCL CAD: CPT | Mod: 26,,, | Performed by: RADIOLOGY

## 2018-10-18 PROCEDURE — 99396 PREV VISIT EST AGE 40-64: CPT | Mod: S$PBB,,, | Performed by: NURSE PRACTITIONER

## 2018-10-18 PROCEDURE — 99999 PR PBB SHADOW E&M-EST. PATIENT-LVL V: CPT | Mod: PBBFAC,,, | Performed by: NURSE PRACTITIONER

## 2018-10-18 PROCEDURE — 90732 PPSV23 VACC 2 YRS+ SUBQ/IM: CPT | Mod: PBBFAC

## 2018-10-18 PROCEDURE — 77067 SCR MAMMO BI INCL CAD: CPT | Mod: TC

## 2018-10-18 PROCEDURE — 99215 OFFICE O/P EST HI 40 MIN: CPT | Mod: PBBFAC | Performed by: NURSE PRACTITIONER

## 2018-10-18 RX ORDER — PANTOPRAZOLE SODIUM 40 MG/1
40 TABLET, DELAYED RELEASE ORAL DAILY
Qty: 30 TABLET | Refills: 1 | Status: SHIPPED | OUTPATIENT
Start: 2018-10-18 | End: 2018-12-14 | Stop reason: SDUPTHER

## 2018-10-18 RX ORDER — INSULIN GLARGINE 100 [IU]/ML
10 INJECTION, SOLUTION SUBCUTANEOUS DAILY
Qty: 1 BOX | Refills: 1
Start: 2018-10-18 | End: 2019-01-31

## 2018-10-18 RX ORDER — PANTOPRAZOLE SODIUM 40 MG/1
40 TABLET, DELAYED RELEASE ORAL
Status: DISCONTINUED | OUTPATIENT
Start: 2018-10-18 | End: 2018-12-14

## 2018-10-18 NOTE — TELEPHONE ENCOUNTER
Lab Results   Component Value Date    TSH 2.892 10/18/2018   wnl    CMP  Sodium   Date Value Ref Range Status   10/18/2018 142 136 - 145 mmol/L Final     Potassium   Date Value Ref Range Status   10/18/2018 4.7 3.5 - 5.1 mmol/L Final     Chloride   Date Value Ref Range Status   10/18/2018 107 95 - 110 mmol/L Final     CO2   Date Value Ref Range Status   10/18/2018 27 23 - 29 mmol/L Final     Glucose   Date Value Ref Range Status   10/18/2018 94 70 - 110 mg/dL Final     BUN, Bld   Date Value Ref Range Status   10/18/2018 22 (H) 6 - 20 mg/dL Final     Creatinine   Date Value Ref Range Status   10/18/2018 1.7 (H) (< 1.7) 0.5 - 1.4 mg/dL Final     Calcium   Date Value Ref Range Status   10/18/2018 9.1 8.7 - 10.5 mg/dL Final     Total Protein   Date Value Ref Range Status   10/18/2018 7.7 6.0 - 8.4 g/dL Final     Albumin   Date Value Ref Range Status   10/18/2018 3.4 (L) 3.5 - 5.2 g/dL Final     Total Bilirubin   Date Value Ref Range Status   10/18/2018 0.3 0.1 - 1.0 mg/dL Final     Comment:     For infants and newborns, interpretation of results should be based  on gestational age, weight and in agreement with clinical  observations.  Premature Infant recommended reference ranges:  Up to 24 hours.............<8.0 mg/dL  Up to 48 hours............<12.0 mg/dL  3-5 days..................<15.0 mg/dL  6-29 days.................<15.0 mg/dL       Alkaline Phosphatase   Date Value Ref Range Status   10/18/2018 77 55 - 135 U/L Final     AST   Date Value Ref Range Status   10/18/2018 22 10 - 40 U/L Final     ALT   Date Value Ref Range Status   10/18/2018 15 10 - 44 U/L Final     Anion Gap   Date Value Ref Range Status   10/18/2018 8 8 - 16 mmol/L Final     eGFR if    Date Value Ref Range Status   10/18/2018 38.3 (A) >60 mL/min/1.73 m^2 Final     eGFR if non    Date Value Ref Range Status   10/18/2018 33.2 (A) >60 mL/min/1.73 m^2 Final     Comment:     Calculation used to obtain the estimated  glomerular filtration  rate (eGFR) is the CKD-EPI equation.      *Stable      CBC  Lab Results   Component Value Date    WBC 12.33 10/18/2018    HGB 10.7 (L) 10/18/2018    HCT 34.1 (L) 10/18/2018    MCV 88 10/18/2018     10/18/2018   *Stable    INR: 1.1 wnl    A1C: 5.0% wnl      Lipid Panel:   Lab Results   Component Value Date    CHOL 213 (H) 10/18/2018    CHOL 209 (H) 04/09/2018     Lab Results   Component Value Date    HDL 76 (H) 10/18/2018    HDL 77 (H) 04/09/2018     Lab Results   Component Value Date    LDLCALC 115.2 10/18/2018    LDLCALC 97.4 04/09/2018     Lab Results   Component Value Date    TRIG 109 10/18/2018    TRIG 173 (H) 04/09/2018     Lab Results   Component Value Date    CHOLHDL 35.7 10/18/2018    CHOLHDL 36.8 04/09/2018   *Marginally increased; in the setting of history of Chronic Hep C, she has been appropriately judiciously dosed; continue current dose; have advised, counseled and educated on dietary modifications for Low Fat / Low Cholesterol diet.       No changes at this time in current POT.   Results will be shared with the patient.     HEAVENLY

## 2018-10-18 NOTE — Clinical Note
[facility] Visit (Post Discharge Follow-up) Today: - Our clinic physicians and nurses plan to follow the patient up for any medical issues in the [facility] for 30 days post discharge.Future Appointments:Future [id number]/18/2018 12:30 PM   [facility] MAMMO8 SCREEN INT MED [facility] MAMID     Cj BARBA 10/19/2018 10:00 AM   DIABETES EDUCATOR, INT ME* [facility] INTLDIA   Cj BARBA 12/27/2018 2:30 PM    LAB, APPOINTMENT [facility] INT* [facility] LAB IM    Cj AMES 12/27/2018 3:30 PM    [facility] [id number]              [facility] MRI IC    Imaging Ctr1/18/2019  9:00 AM    Darcy Engel, * [facility]        Cj Dill [facility]

## 2018-10-18 NOTE — PATIENT INSTRUCTIONS
1) No Alcohol    2) No NSAIDS (Motrin, Ibuprofen, Advil, Aleve, Celebrex, Mobic)    3) Will contact you with results of labs done in clinic today.     4) Call with questions.       Future Appointments   Date Time Provider Department Center   10/19/2018 10:00 AM DIABETES EDUCATOR, INT MED 1 [facility] ROSENDO AMES   12/27/2018  2:30 PM [facility]   [facility]27[facility]2018  [facility]   1/18/2019  9:00 AM WHITNEY Lombardo [facility] Cj BARBA

## 2018-10-19 ENCOUNTER — CLINICAL SUPPORT (OUTPATIENT)
Dept: DIABETES | Facility: CLINIC | Age: 58
End: 2018-10-19
Payer: MEDICARE

## 2018-10-19 DIAGNOSIS — N18.30 TYPE 2 DIABETES MELLITUS WITH STAGE 3 CHRONIC KIDNEY DISEASE, WITH LONG-TERM CURRENT USE OF INSULIN: Primary | ICD-10-CM

## 2018-10-19 DIAGNOSIS — Z79.4 TYPE 2 DIABETES MELLITUS WITH STAGE 3 CHRONIC KIDNEY DISEASE, WITH LONG-TERM CURRENT USE OF INSULIN: Primary | ICD-10-CM

## 2018-10-19 DIAGNOSIS — Z79.4 TYPE 2 DIABETES MELLITUS WITH DIABETIC PERIPHERAL ANGIOPATHY WITHOUT GANGRENE, WITH LONG-TERM CURRENT USE OF INSULIN: ICD-10-CM

## 2018-10-19 DIAGNOSIS — E11.22 TYPE 2 DIABETES MELLITUS WITH STAGE 3 CHRONIC KIDNEY DISEASE, WITH LONG-TERM CURRENT USE OF INSULIN: Primary | ICD-10-CM

## 2018-10-19 DIAGNOSIS — E11.51 TYPE 2 DIABETES MELLITUS WITH DIABETIC PERIPHERAL ANGIOPATHY WITHOUT GANGRENE, WITH LONG-TERM CURRENT USE OF INSULIN: ICD-10-CM

## 2018-10-19 PROCEDURE — G0108 DIAB MANAGE TRN  PER INDIV: HCPCS | Mod: PBBFAC | Performed by: DIETITIAN, REGISTERED

## 2018-10-19 RX ORDER — LANCETS
EACH MISCELLANEOUS
Qty: 50 EACH | Refills: 11 | Status: SHIPPED | OUTPATIENT
Start: 2018-10-19 | End: 2020-05-13

## 2018-10-19 RX ORDER — INSULIN PUMP SYRINGE, 3 ML
EACH MISCELLANEOUS
Qty: 1 EACH | Refills: 0 | Status: ON HOLD | OUTPATIENT
Start: 2018-10-19 | End: 2019-01-16

## 2018-10-19 NOTE — PROGRESS NOTES
Diabetes Education  Author: Alejandra Delarosa RD  Date: 10/19/2018    Diabetes Care Management Summary  Diabetes Education Record Assessment: Initial    Current Diabetes Risk Level: Low     Diabetes Type  Diabetes Type : Type II    Diabetes History  Diabetes Diagnosis: >10 years    Current Treatment: Insulin (Basaglar 10 units every morning)        Health Maintenance was reviewed today with patient. Discussed with patient importance of routine eye exams, foot exams/foot care, blood work (i.e.: A1c, microalbumin, and lipid), dental visits, yearly flu vaccine, and pneumonia vaccine as indicated by PCP. Patient verbalized understanding.     Health Maintenance Topics with due status: Not Due       Topic Last Completion Date    Eye Exam 05/02/2018    Low Dose Statin 07/13/2018    TETANUS VACCINE 10/18/2018    Pneumococcal PPSV23 (Medium Risk) 10/18/2018    Lipid Panel 10/18/2018    Hemoglobin A1c 10/18/2018     Health Maintenance Due   Topic Date Due    Foot Exam  02/08/1972    Mammogram  02/08/2002    Colonoscopy  02/08/2012    Influenza Vaccine  08/01/2018       Nutrition  Meal Planning: (1-2 meals daily)  What type of beverages do you drink?: diet soda/tea, water (diet coke (16 oz or 12 oz - 3 per day); )  Meal Plan 24 Hour Recall - Lunch: (reports she likes fried food)  Meal Plan 24 Hour Recall - Dinner: (meat, vegetables, a little rice)  Meal Plan 24 Hour Recall - Snack: (chips)    Monitoring   Self Monitoring : (hasn't tested in about a month; was running 120-150 after eating; <100 before eating)  Blood Glucose Logs: No  Do you use a personal continuous glucose monitor?: No  In the last month, how often have you had a low blood sugar reaction?: never    Exercise   Exercise Type: walking  Frequency: Daily  Duration: 45 min      Social History  Preferred Learning Method: Face to Face  Primary Support: Self, Family  Smoking Status: Never a Smoker  Alcohol Use: Weekly  DDS-2 Score  ( > 3 = SIGNIFICANT DISTRESS):  1.5  Barriers to Change: None  Learning Challenges : None  Readiness to Learn : Acceptance  Cultural Influences: No        Diabetes Education Assessment/Progress  Diabetes Disease Process (diabetes disease process and treatment options): Discussion, Instructed, Individual Session, Written Materials Provided, Comprehends Key Points  Reviewed disease process and discussed need for continued management of BG's and A1c. Discussed current treatment options including dietary and lifestyle changes as well as medication additions and/or changes. Diabetes is well controlled, but pt is interested in losing weight.    Nutrition (Incorporating nutritional management into one's lifestyle): Discussion, Instructed, Individual Session, Written Materials Provided, Comprehends Key Points  Reviewed basic ADA recommendations. Reviewed carb vs non-carb foods and need to limit carb servings at all meals. Pt eats a lot of fried foods (meats) and mostly fatty cuts of meat. Discussed need to start baking, grilling, or air frying more often - to help encourage weight loss as well as overall cholesterol control and general health. Encouraged her to limit fatty foods/meats to twice weekly. Encouraged fresh fruits and vegetables every day.    Physical Activity (incorporating physical activity into one's lifestyle): Discussion, Demonstrates Understanding/Competency (verbalizes/demonstrates), Written Materials Provided  Currently meeting PA recommendations. She walks for 30 min to an hour every day at [employer]. Discussed goals and benefits of regular PA.     Medications (states correct name, dose, onset, peak, duration, side effects & timing of meds): Discussion, Instructed, Individual Session, Written Materials Provided, Comprehends Key Points  Has been on metformin and glipizide in the past. Now on basal insulin 2/2 declined kidney function. Reviewed timing and MOA of basal insulin. Discussed rotation of injection sites and appropriate  storage of insulin pens. Reviewed possibility of d/c'ing insulin following diet changes and possible weight loss.     Monitoring (monitoring blood glucose/other parameters & using results): Discussion, Instructed, Individual Session, Written Materials Provided, Comprehends Key Points  Pt requests info on Freestyle Malissa CGM - discussed medicare qualifications for coverage. Discussed goal BGs for different times of day and in relation to meals. Instructed pt to test BG once daily: fasting vs 2-hours after any meal. Reviewed need for updated BG logs for all endo, PCP, and education appts. Provided her with new One Touch meter (strips always covered by Medicare) as she currently does not have a meter with strips she can afford.    Acute Complications (preventing, detecting, and treating acute complications): Discussion, Instructed, Individual Session, Written Materials Provided, Comprehends Key Points  Discussed hypoglycemia vs hyperglycemia symptoms and discussed appropriate treatments for each. Reviewed that pt is at moderate risk of hypo with current medication regimen. Discussed general vs severe hyperglycemia and risk of DKA.    Chronic Complications (preventing, detecting, and treating chronic complications): Discussion, Instructed, Individual Session, Written Materials Provided, Comprehends Key Points  Reviewed annual diabetes care schedule and patient priorities.    Clinical (diabetes, other pertinent medical history, and relevant comorbidities reviewed during visit): Discussion  Reports she had a stroke about 1-2 years ago. Recently seen at [facility] for GIB - unknown source at this time.    Cognitive (knowledge of self-management skills, functional health literacy): Individual Session  Arrives with general knowledge of diabetes management. Leaves with increased knowledge base - will benefit from f/u annually. Provided information on diabetes group classes here at [facility].    Psychosocial (emotional response to  diabetes): Individual Session  No negative feelings toward diabetes dx or disease management noted.    Diabetes Distress and Support Systems: Individual Session  No distress noted. Provided information on diabetes support group here at [facility].    Behavioral (readiness for change, lifestyle practices, self-care behaviors): Individual Session  Appears motivated to make recommended changes.        Goals  Patient has selected/evaluated goals during today's session: Yes, selected    Healthy Eating: Set (Bake, grill or air smith lean meats instead of frying fatty meats! Limit fatty/fried foods to twice per week)  Start Date: 10/19/18         Diabetes Care Plan/Intervention  Education Plan/Intervention: Individual Follow-Up DSMT        Diabetes Meal Plan  Restrictions: Restricted Carbohydrate, Low Fat        Today's Self-Management Care Plan was developed with the patient's input and is based on barriers identified during today's assessment.    The long and short-term goals in the care plan were written with the patient/caregiver's input. The patient has agreed to work toward these goals to improve her overall diabetes control.      The patient received a copy of today's self-management plan and verbalized understanding of the care plan, goals, and all of today's instructions.      The patient was encouraged to communicate with her physician and care team regarding her condition(s) and treatment.  I provided the patient with my contact information today and encouraged her to contact me via phone or patient portal as needed.         Education Units of Time   Time Spent: 60 min

## 2018-10-23 ENCOUNTER — TELEPHONE (OUTPATIENT)
Dept: INTERNAL MEDICINE | Facility: CLINIC | Age: 58
End: 2018-10-23

## 2018-10-23 NOTE — TELEPHONE ENCOUNTER
Mammogram is normal.   Repeat in 1 year.  Results shared with the patient via the patient portal     SDJ

## 2018-12-14 RX ORDER — PANTOPRAZOLE SODIUM 40 MG/1
40 TABLET, DELAYED RELEASE ORAL DAILY
Qty: 90 TABLET | Refills: 3 | Status: SHIPPED | OUTPATIENT
Start: 2018-12-14 | End: 2019-07-08 | Stop reason: SDUPTHER

## 2018-12-27 ENCOUNTER — TELEPHONE (OUTPATIENT)
Dept: HEPATOLOGY | Facility: CLINIC | Age: 58
End: 2018-12-27

## 2018-12-27 ENCOUNTER — HOSPITAL ENCOUNTER (OUTPATIENT)
Dept: RADIOLOGY | Facility: HOSPITAL | Age: 58
Discharge: HOME | End: 2018-12-27
Attending: PHYSICIAN ASSISTANT
Payer: MEDICARE

## 2018-12-27 DIAGNOSIS — K76.9 LIVER LESION: ICD-10-CM

## 2018-12-27 NOTE — TELEPHONE ENCOUNTER
12/27 CMP reviewed  BUN 33, Cr 2.0  Pt reports drinking ~ 130 ounces water daily. Doesn't think she's dehydrated.  Noted MRI to eval liver lesion was cancelled by radiology due to low GFR.  Pt established w/ nephrology; will message them to see if they need to see pt sooner    AFP pending

## 2018-12-31 ENCOUNTER — TELEPHONE (OUTPATIENT)
Dept: HEPATOLOGY | Facility: CLINIC | Age: 58
End: 2018-12-31

## 2018-12-31 DIAGNOSIS — R79.89 CREATININE ELEVATION: Primary | ICD-10-CM

## 2018-12-31 NOTE — TELEPHONE ENCOUNTER
----- Message from Leonard Goldsmith MD sent at 12/27/2018  4:12 PM CST -----  Hi, thanks for the update, will set her up.     ----- Message -----  From: Jennifer B. Scheuermann, PA  Sent: 12/27/2018   3:45 PM  To: Leonard Goldsmith MD    Hi Dr Goldsmith,  I ordered a CMP on this pt prior to an MRI (to eval a liver lesion) and it appears her renal function has worsened some. I thought she might be a little dehydrated but she reports drinking ~ 130 ounces water per day and urinating regularly.    Her MRI was cancelled by radiology due to her GFR.  I believe you planned to see her for f/u in Feb. I wasn't sure if she was someone you might want to see sooner given the increasing Cr.    Charlene

## 2018-12-31 NOTE — TELEPHONE ENCOUNTER
12/27 AFP 8.4    pls call pt  Tell her that tumor marker blood test looked fine  I forwarded recent labs to kidney doctor and they should be calling her to schedule a follow up.  We will hold off on rescheduling the MRI until she sees them and has f/u kidney blood work    Schedule BMP in 2 weeks  thx

## 2018-12-31 NOTE — TELEPHONE ENCOUNTER
I spoke with patient and msg from PA Scheuermann relayed.  Lab draw scheduled 1/14; reminder notice mailed.

## 2019-01-14 ENCOUNTER — TELEPHONE (OUTPATIENT)
Dept: INTERNAL MEDICINE | Facility: CLINIC | Age: 59
End: 2019-01-14

## 2019-01-14 ENCOUNTER — HOSPITAL ENCOUNTER (INPATIENT)
Facility: HOSPITAL | Age: 59
LOS: 3 days | Discharge: HOME | DRG: 305 | End: 2019-01-17
Attending: EMERGENCY MEDICINE | Admitting: INTERNAL MEDICINE
Payer: MEDICARE

## 2019-01-14 DIAGNOSIS — E11.59 TYPE 2 DIABETES MELLITUS WITH OTHER CIRCULATORY COMPLICATION, WITH LONG-TERM CURRENT USE OF INSULIN: Primary | ICD-10-CM

## 2019-01-14 DIAGNOSIS — R07.9 CHEST PAIN: ICD-10-CM

## 2019-01-14 DIAGNOSIS — R06.02 SOB (SHORTNESS OF BREATH): ICD-10-CM

## 2019-01-14 DIAGNOSIS — N18.31 CKD STAGE G3A/A3, GFR 45-59 AND ALBUMIN CREATININE RATIO >300 MG/G: ICD-10-CM

## 2019-01-14 DIAGNOSIS — I50.9 CHF (CONGESTIVE HEART FAILURE): ICD-10-CM

## 2019-01-14 DIAGNOSIS — Z79.4 TYPE 2 DIABETES MELLITUS WITH OTHER CIRCULATORY COMPLICATION, WITH LONG-TERM CURRENT USE OF INSULIN: Primary | ICD-10-CM

## 2019-01-14 DIAGNOSIS — I10 ESSENTIAL HYPERTENSION: ICD-10-CM

## 2019-01-14 PROBLEM — I16.1 HYPERTENSIVE EMERGENCY: Status: ACTIVE | Noted: 2019-01-14

## 2019-01-14 LAB
ALBUMIN SERPL BCP-MCNC: 3.1 G/DL
ALP SERPL-CCNC: 93 U/L
ALT SERPL W/O P-5'-P-CCNC: 13 U/L
ANION GAP SERPL CALC-SCNC: 9 MMOL/L
AST SERPL-CCNC: 17 U/L
BASOPHILS # BLD AUTO: 0.04 K/UL
BASOPHILS # BLD AUTO: 0.05 K/UL
BASOPHILS NFR BLD: 0.4 %
BASOPHILS NFR BLD: 0.5 %
BILIRUB SERPL-MCNC: 0.5 MG/DL
BNP SERPL-MCNC: 190 PG/ML
BUN SERPL-MCNC: 25 MG/DL
CALCIUM SERPL-MCNC: 8.8 MG/DL
CHLORIDE SERPL-SCNC: 107 MMOL/L
CO2 SERPL-SCNC: 26 MMOL/L
CREAT SERPL-MCNC: 1.7 MG/DL
DIFFERENTIAL METHOD: ABNORMAL
DIFFERENTIAL METHOD: ABNORMAL
EOSINOPHIL # BLD AUTO: 0.2 K/UL
EOSINOPHIL # BLD AUTO: 0.2 K/UL
EOSINOPHIL NFR BLD: 1.5 %
EOSINOPHIL NFR BLD: 1.7 %
ERYTHROCYTE [DISTWIDTH] IN BLOOD BY AUTOMATED COUNT: 14.8 %
ERYTHROCYTE [DISTWIDTH] IN BLOOD BY AUTOMATED COUNT: 15 %
EST. GFR  (AFRICAN AMERICAN): 38.3 ML/MIN/1.73 M^2
EST. GFR  (NON AFRICAN AMERICAN): 33.2 ML/MIN/1.73 M^2
ESTIMATED AVG GLUCOSE: 97 MG/DL
GLUCOSE SERPL-MCNC: 83 MG/DL
HBA1C MFR BLD HPLC: 5 %
HCT VFR BLD AUTO: 35.5 %
HCT VFR BLD AUTO: 37.3 %
HGB BLD-MCNC: 11.7 G/DL
HGB BLD-MCNC: 12.3 G/DL
IMM GRANULOCYTES # BLD AUTO: 0.04 K/UL
IMM GRANULOCYTES # BLD AUTO: 0.04 K/UL
IMM GRANULOCYTES NFR BLD AUTO: 0.4 %
IMM GRANULOCYTES NFR BLD AUTO: 0.4 %
INR PPP: 1
INR PPP: 1.1
LYMPHOCYTES # BLD AUTO: 2.3 K/UL
LYMPHOCYTES # BLD AUTO: 2.5 K/UL
LYMPHOCYTES NFR BLD: 19.8 %
LYMPHOCYTES NFR BLD: 22.7 %
MAGNESIUM SERPL-MCNC: 1.8 MG/DL
MCH RBC QN AUTO: 27.9 PG
MCH RBC QN AUTO: 28.7 PG
MCHC RBC AUTO-ENTMCNC: 33 G/DL
MCHC RBC AUTO-ENTMCNC: 33 G/DL
MCV RBC AUTO: 85 FL
MCV RBC AUTO: 87 FL
MONOCYTES # BLD AUTO: 0.9 K/UL
MONOCYTES # BLD AUTO: 1 K/UL
MONOCYTES NFR BLD: 8.2 %
MONOCYTES NFR BLD: 8.7 %
NEUTROPHILS # BLD AUTO: 7.3 K/UL
NEUTROPHILS # BLD AUTO: 7.9 K/UL
NEUTROPHILS NFR BLD: 66 %
NEUTROPHILS NFR BLD: 69.7 %
NRBC BLD-RTO: 0 /100 WBC
NRBC BLD-RTO: 0 /100 WBC
PHOSPHATE SERPL-MCNC: 4 MG/DL
PLATELET # BLD AUTO: 170 K/UL
PLATELET # BLD AUTO: 178 K/UL
PMV BLD AUTO: 10.8 FL
PMV BLD AUTO: 11.1 FL
POCT GLUCOSE: 77 MG/DL (ref 70–110)
POTASSIUM SERPL-SCNC: 3.3 MMOL/L
PROT SERPL-MCNC: 7.7 G/DL
PROTHROMBIN TIME: 10.8 SEC
PROTHROMBIN TIME: 11.3 SEC
RBC # BLD AUTO: 4.08 M/UL
RBC # BLD AUTO: 4.41 M/UL
SODIUM SERPL-SCNC: 142 MMOL/L
TROPONIN I SERPL DL<=0.01 NG/ML-MCNC: <0.006 NG/ML
WBC # BLD AUTO: 11.08 K/UL
WBC # BLD AUTO: 11.37 K/UL

## 2019-01-14 PROCEDURE — 63600175 PHARM REV CODE 636 W HCPCS

## 2019-01-14 PROCEDURE — 25000003 PHARM REV CODE 250: Performed by: NURSE PRACTITIONER

## 2019-01-14 PROCEDURE — 84100 ASSAY OF PHOSPHORUS: CPT

## 2019-01-14 PROCEDURE — 93010 ELECTROCARDIOGRAM REPORT: CPT | Mod: 76,,, | Performed by: INTERNAL MEDICINE

## 2019-01-14 PROCEDURE — 83735 ASSAY OF MAGNESIUM: CPT

## 2019-01-14 PROCEDURE — 85610 PROTHROMBIN TIME: CPT | Mod: 91

## 2019-01-14 PROCEDURE — 99291 PR CRITICAL CARE, E/M 30-74 MINUTES: ICD-10-PCS | Mod: ,,, | Performed by: EMERGENCY MEDICINE

## 2019-01-14 PROCEDURE — 99291 CRITICAL CARE FIRST HOUR: CPT | Mod: GC,,, | Performed by: INTERNAL MEDICINE

## 2019-01-14 PROCEDURE — 83036 HEMOGLOBIN GLYCOSYLATED A1C: CPT

## 2019-01-14 PROCEDURE — 99291 CRITICAL CARE FIRST HOUR: CPT | Mod: ,,, | Performed by: EMERGENCY MEDICINE

## 2019-01-14 PROCEDURE — 99291 CRITICAL CARE FIRST HOUR: CPT | Mod: 25

## 2019-01-14 PROCEDURE — 99291 PR CRITICAL CARE, E/M 30-74 MINUTES: ICD-10-PCS | Mod: GC,,, | Performed by: INTERNAL MEDICINE

## 2019-01-14 PROCEDURE — 84484 ASSAY OF TROPONIN QUANT: CPT | Mod: 91

## 2019-01-14 PROCEDURE — 96365 THER/PROPH/DIAG IV INF INIT: CPT

## 2019-01-14 PROCEDURE — 63600175 PHARM REV CODE 636 W HCPCS: Performed by: EMERGENCY MEDICINE

## 2019-01-14 PROCEDURE — 85610 PROTHROMBIN TIME: CPT

## 2019-01-14 PROCEDURE — 25000003 PHARM REV CODE 250: Performed by: EMERGENCY MEDICINE

## 2019-01-14 PROCEDURE — 83880 ASSAY OF NATRIURETIC PEPTIDE: CPT

## 2019-01-14 PROCEDURE — 80053 COMPREHEN METABOLIC PANEL: CPT

## 2019-01-14 PROCEDURE — 82962 GLUCOSE BLOOD TEST: CPT

## 2019-01-14 PROCEDURE — 93010 EKG 12-LEAD: ICD-10-PCS | Mod: 76,,, | Performed by: INTERNAL MEDICINE

## 2019-01-14 PROCEDURE — 85025 COMPLETE CBC W/AUTO DIFF WBC: CPT | Mod: 91

## 2019-01-14 PROCEDURE — 63600175 PHARM REV CODE 636 W HCPCS: Performed by: STUDENT IN AN ORGANIZED HEALTH CARE EDUCATION/TRAINING PROGRAM

## 2019-01-14 PROCEDURE — 96375 TX/PRO/DX INJ NEW DRUG ADDON: CPT

## 2019-01-14 PROCEDURE — 96376 TX/PRO/DX INJ SAME DRUG ADON: CPT

## 2019-01-14 PROCEDURE — 93005 ELECTROCARDIOGRAM TRACING: CPT

## 2019-01-14 PROCEDURE — 96366 THER/PROPH/DIAG IV INF ADDON: CPT

## 2019-01-14 PROCEDURE — 12000002 HC ACUTE/MED SURGE SEMI-PRIVATE ROOM

## 2019-01-14 RX ORDER — IBUPROFEN 200 MG
24 TABLET ORAL
Status: DISCONTINUED | OUTPATIENT
Start: 2019-01-14 | End: 2019-01-17 | Stop reason: HOSPADM

## 2019-01-14 RX ORDER — GLUCAGON 1 MG
1 KIT INJECTION
Status: DISCONTINUED | OUTPATIENT
Start: 2019-01-14 | End: 2019-01-17 | Stop reason: HOSPADM

## 2019-01-14 RX ORDER — ACETAMINOPHEN 500 MG
1000 TABLET ORAL
Status: COMPLETED | OUTPATIENT
Start: 2019-01-14 | End: 2019-01-14

## 2019-01-14 RX ORDER — ONDANSETRON 4 MG/1
4 TABLET, ORALLY DISINTEGRATING ORAL
Status: COMPLETED | OUTPATIENT
Start: 2019-01-14 | End: 2019-01-14

## 2019-01-14 RX ORDER — RAMELTEON 8 MG/1
8 TABLET ORAL NIGHTLY PRN
Status: DISCONTINUED | OUTPATIENT
Start: 2019-01-14 | End: 2019-01-17 | Stop reason: HOSPADM

## 2019-01-14 RX ORDER — IBUPROFEN 200 MG
16 TABLET ORAL
Status: DISCONTINUED | OUTPATIENT
Start: 2019-01-14 | End: 2019-01-17 | Stop reason: HOSPADM

## 2019-01-14 RX ORDER — AMLODIPINE BESYLATE 10 MG/1
10 TABLET ORAL DAILY
Status: DISCONTINUED | OUTPATIENT
Start: 2019-01-15 | End: 2019-01-17 | Stop reason: HOSPADM

## 2019-01-14 RX ORDER — LEVOTHYROXINE SODIUM 100 UG/1
200 TABLET ORAL DAILY
Status: DISCONTINUED | OUTPATIENT
Start: 2019-01-15 | End: 2019-01-17 | Stop reason: HOSPADM

## 2019-01-14 RX ORDER — NITROGLYCERIN 0.4 MG/1
TABLET SUBLINGUAL
Status: COMPLETED
Start: 2019-01-14 | End: 2019-01-14

## 2019-01-14 RX ORDER — PROMETHAZINE HYDROCHLORIDE 25 MG/1
25 TABLET ORAL EVERY 6 HOURS PRN
Status: DISCONTINUED | OUTPATIENT
Start: 2019-01-14 | End: 2019-01-17 | Stop reason: HOSPADM

## 2019-01-14 RX ORDER — ONDANSETRON 8 MG/1
8 TABLET, ORALLY DISINTEGRATING ORAL EVERY 8 HOURS PRN
Status: DISCONTINUED | OUTPATIENT
Start: 2019-01-14 | End: 2019-01-17 | Stop reason: HOSPADM

## 2019-01-14 RX ORDER — NITROGLYCERIN 0.4 MG/1
0.4 TABLET SUBLINGUAL EVERY 5 MIN PRN
Status: DISCONTINUED | OUTPATIENT
Start: 2019-01-14 | End: 2019-01-17 | Stop reason: HOSPADM

## 2019-01-14 RX ORDER — LABETALOL HCL 20 MG/4 ML
10 SYRINGE (ML) INTRAVENOUS
Status: COMPLETED | OUTPATIENT
Start: 2019-01-14 | End: 2019-01-14

## 2019-01-14 RX ORDER — ESCITALOPRAM OXALATE 10 MG/1
10 TABLET ORAL DAILY
Status: DISCONTINUED | OUTPATIENT
Start: 2019-01-15 | End: 2019-01-17 | Stop reason: HOSPADM

## 2019-01-14 RX ORDER — SODIUM CHLORIDE 0.9 % (FLUSH) 0.9 %
3 SYRINGE (ML) INJECTION
Status: DISCONTINUED | OUTPATIENT
Start: 2019-01-14 | End: 2019-01-17 | Stop reason: HOSPADM

## 2019-01-14 RX ORDER — ENOXAPARIN SODIUM 100 MG/ML
40 INJECTION SUBCUTANEOUS EVERY 24 HOURS
Status: DISCONTINUED | OUTPATIENT
Start: 2019-01-14 | End: 2019-01-17 | Stop reason: HOSPADM

## 2019-01-14 RX ORDER — CARVEDILOL 25 MG/1
25 TABLET ORAL 2 TIMES DAILY WITH MEALS
Status: DISCONTINUED | OUTPATIENT
Start: 2019-01-14 | End: 2019-01-17 | Stop reason: HOSPADM

## 2019-01-14 RX ORDER — LABETALOL HCL 20 MG/4 ML
20 SYRINGE (ML) INTRAVENOUS
Status: COMPLETED | OUTPATIENT
Start: 2019-01-14 | End: 2019-01-14

## 2019-01-14 RX ORDER — PANTOPRAZOLE SODIUM 40 MG/1
40 TABLET, DELAYED RELEASE ORAL DAILY
Status: DISCONTINUED | OUTPATIENT
Start: 2019-01-15 | End: 2019-01-17 | Stop reason: HOSPADM

## 2019-01-14 RX ORDER — ESCITALOPRAM OXALATE 10 MG/1
10 TABLET ORAL DAILY
COMMUNITY
End: 2019-11-08 | Stop reason: SDUPTHER

## 2019-01-14 RX ORDER — NICARDIPINE HYDROCHLORIDE 0.2 MG/ML
5 INJECTION INTRAVENOUS CONTINUOUS
Status: DISCONTINUED | OUTPATIENT
Start: 2019-01-14 | End: 2019-01-15

## 2019-01-14 RX ORDER — LISINOPRIL 20 MG/1
40 TABLET ORAL DAILY
Status: DISCONTINUED | OUTPATIENT
Start: 2019-01-15 | End: 2019-01-17 | Stop reason: HOSPADM

## 2019-01-14 RX ORDER — INSULIN ASPART 100 [IU]/ML
0-5 INJECTION, SOLUTION INTRAVENOUS; SUBCUTANEOUS
Status: DISCONTINUED | OUTPATIENT
Start: 2019-01-14 | End: 2019-01-17 | Stop reason: HOSPADM

## 2019-01-14 RX ADMIN — LABETALOL HYDROCHLORIDE 10 MG: 5 INJECTION, SOLUTION INTRAVENOUS at 02:01

## 2019-01-14 RX ADMIN — ONDANSETRON 4 MG: 4 TABLET, ORALLY DISINTEGRATING ORAL at 01:01

## 2019-01-14 RX ADMIN — LABETALOL HYDROCHLORIDE 20 MG: 5 INJECTION, SOLUTION INTRAVENOUS at 03:01

## 2019-01-14 RX ADMIN — ACETAMINOPHEN 1000 MG: 500 TABLET ORAL at 01:01

## 2019-01-14 RX ADMIN — NICARDIPINE HYDROCHLORIDE 5 MG/HR: 0.2 INJECTION, SOLUTION INTRAVENOUS at 04:01

## 2019-01-14 RX ADMIN — NITROGLYCERIN 0.4 MG: 0.4 TABLET SUBLINGUAL at 01:01

## 2019-01-14 RX ADMIN — CARVEDILOL 25 MG: 25 TABLET, FILM COATED ORAL at 06:01

## 2019-01-14 NOTE — ED NOTES
Assumed care, received report from ALIVIA Anderson. Daughter at bedside.Patient is in bed awake, alert, and oriented x 4, cooperative, VSS, except BP is abnormally high, Dr Varma is aware, airway open and patent, respirations spontaneous, even and unlabored, with normal rate and rhythm, skin warm, CDI, moves all extremities well, no weakness or edema observed. L AC PIV, intact, patent, flushes easily, with good blood return, site is WNL, no erythema, pain or edema to site, dressing is CDI.  Patient reports chest pain has decreased to 5/10. Assisted patient to reposition for comfort and given opportunity for toileting. Updated patient on current status, discussed care plan, patient denies any additional needs at this time and has no complaints or questions. Room assessed for safety measures and cleanliness, no action needed at this time. The bed is in low, locked position with side rails up x 2. Personal belongings and call light in reach. Patient is oriented to call light use. Patient verbalizes will call nurse if assistance is needed. Will continue to monitor.

## 2019-01-14 NOTE — TELEPHONE ENCOUNTER
Pt given 325mg of aspirin per Dr. Price. Pt was still c/o tightness in chest and numbness to left arm.

## 2019-01-14 NOTE — ED NOTES
Hourly rounding performed at this time. Daughter at bedside. Patient is in bed awake and alert, resting comfortably. BP continues to be abnormally high. Dr bowser aware, see new order in MAR. Patient reports her s/s are the same as they were since last assessment. Assessed for need of repositioning and given opportunity for toileting. Discussed care plan, patient denies any additional needs at this time and has no complaints or questions. Room assessed for safety measures and cleanliness, no action needed at this time. The bed is in low, locked position with side rails up x 2. Personal belongings and call light in reach. Patient is oriented to call light use. Patient verbalizes will call nurse if assistance is needed.

## 2019-01-14 NOTE — ED NOTES
Hourly rounding performed at this time. Daughter at bedside. Patient is in bed awake and alert, resting comfortably. BP continues to be abnormally high. Dashawn reports her s/s have not changed since last assessment. Assessed for need of repositioning and given opportunity for toileting. Discussed care plan, patient denies any additional needs at this time and has no complaints or questions. Room assessed for safety measures and cleanliness, no action needed at this time. The bed is in low, locked position with side rails up x 2. Personal belongings and call light in reach. Patient is oriented to call light use. Patient verbalizes will call nurse if assistance is needed.

## 2019-01-14 NOTE — TELEPHONE ENCOUNTER
Pt arrived for a lab visit but was unable to draw her labs because she started to C/O chest pain at check- in station. Pt has hx of congestive HF. Pt stated she has been symptomatic for 2days and she stated she started to feel the nausea and numbness in left arm this morning.Pt remains symptomatic during assessment (numness in left arm, nausea and elevated BP). This RN along with Mary GUZMAN assessed pt and the following VS noted:    /96 76P (A)   /105 (M)  Recheck 210/92 P86 (A)    This RN along with Mary GUZMAN informed pt's doctor (Dr. Price) and per Dr. Price's order, pt is to be administered 325mg of Aspirin PO (administered by Mary GUZMAN) and pt needs to be escorted to ER.     911 called and protocol followed. Paramedics performed EKG and pt escorted to [facility] ER. Pt informed that she needs to FU with Dr. Price when discharged from hospital, pt verbalized understanding.

## 2019-01-14 NOTE — ED NOTES
Hourly rounding performed at this time. Daughter at bedside. Patient is in bed awake and alert, resting.  BP continues to be abnormally high, Dr bowser is aware. No pain reported or observed. Assessed for need of repositioning and given opportunity for toileting. Discussed care plan, patient denies any additional needs at this time and has no complaints or questions. Room assessed for safety measures and cleanliness, no action needed at this time. The bed is in low, locked position with side rails up x 2. Personal belongings and call light in reach. Patient is oriented to call light use. Patient verbalizes will call nurse if assistance is needed.

## 2019-01-14 NOTE — ED PROVIDER NOTES
"Encounter Date: 1/14/2019    SCRIBE #1 NOTE: I, Rosa Escobar, am scribing for, and in the presence of, Dr. Varma.       History     Chief Complaint   Patient presents with    Chest Pain     Pt sent from clinic for elevated BP and chest pain.       58 y.o. female with a previous medical history including DM, HTN, and CHF presents to the ED complaining of chest pain for 2 days. Patient describes the pain as smothering. Associated symptoms include SOB, nausea, fatigue, headache, back pain, and increased frequency of urination. Denies abdominal pain, eye drainage, hematuria, wounds, rashes, erythema, or sores. Patient describes cold-like symptoms starting 3 days ago that resolved. Her left anterior chest pain and intrascapular back pain began 2 days ago and has caused some sleep disturbance. She reports that she has had to prop herself up on 4 pillows because when she lays flat she feels like "the food I ate is coming up in my chest." Patient had lab work scheduled this morning at the clinic when she was referred to the ED. Patient is compliant with her medications and took all today except her insulin. She reports that she quit her job as a  2 years ago due to her DM. She currently works as a  and baker for events.      The history is provided by the patient and medical records.     Review of patient's allergies indicates:   Allergen Reactions    Bactrim [sulfamethoxazole-trimethoprim] Hives    Codeine Hives    Penicillins Hives    Shellfish containing products Itching    Sulfa (sulfonamide antibiotics) Hives     Past Medical History:   Diagnosis Date    CKD stage G3a/A3, GFR 45-59 and albumin creatinine ratio >300 mg/g 5/16/2018    CVA (cerebral vascular accident) 2017    Essential hypertension 4/9/2018    Factor XI deficiency 1978    GIB (gastrointestinal bleeding) 10/18/2018    History of hepatitis C     s/p succesful Rx w/ [facility] by Dr Herbert  - SVR12 (cure) 2016    " Hyperlipidemia     Mixed hyperlipidemia 4/9/2018    Postoperative hypothyroidism     Proteinuria 4/9/2018    Type 2 diabetes mellitus with circulatory disorder, with long-term current use of insulin 4/9/2018     Past Surgical History:   Procedure Laterality Date    BACK SURGERY      COLONOSCOPY  11/13/2015    Dr Chino Herbert - brian prep. internal hemorrhoids. diverticulosis of sigmois. NO polyps. repeat due 2025    galbladder      HYSTERECTOMY      OOPHORECTOMY      one    TONSILLECTOMY       Family History   Problem Relation Age of Onset    Diabetes type II Mother      Social History     Tobacco Use    Smoking status: Never Smoker    Smokeless tobacco: Never Used   Substance Use Topics    Alcohol use: Yes    Drug use: Not on file     Review of Systems   Constitutional: Positive for fatigue. Negative for fever.   HENT: Negative for sore throat.    Eyes: Negative for discharge.   Respiratory: Positive for shortness of breath.    Cardiovascular: Positive for chest pain (smothering pain).   Gastrointestinal: Positive for nausea. Negative for abdominal pain.   Genitourinary: Positive for frequency. Negative for flank pain.   Musculoskeletal: Positive for back pain. Negative for neck pain.   Skin: Negative for rash and wound.   Neurological: Positive for headaches. Negative for weakness.   Psychiatric/Behavioral: Positive for sleep disturbance. Negative for confusion.   All other systems reviewed and are negative.      Physical Exam     Initial Vitals [01/14/19 1141]   BP Pulse Resp Temp SpO2   (!) 218/122 78 16 98.4 °F (36.9 °C) 98 %      MAP       --         Physical Exam    Constitutional: She is cooperative. She does not appear ill. She appears distressed.   HENT:   Head: Normocephalic and atraumatic.   Mouth/Throat: Oropharynx is clear and moist.   Eyes: Conjunctivae are normal.   Neck: Normal range of motion. Neck supple.   Cardiovascular: Normal rate and regular rhythm.   Pulmonary/Chest: Breath  sounds normal. No tachypnea. No respiratory distress.   Abdominal: She exhibits no distension. There is no tenderness.   Musculoskeletal: Normal range of motion.   Neurological: She is alert.   Skin: Skin is warm and dry.         ED Course   Critical Care  Date/Time: 1/14/2019 3:45 PM  Performed by: Armand Parham MD  Authorized by: Taylor Olguin DO   Direct patient critical care time: 20 minutes  Additional history critical care time: 5 minutes  Ordering / reviewing critical care time: 15 minutes  Documentation critical care time: 15 minutes  Consulting other physicians critical care time: 5 minutes  Consult with family critical care time: 5 minutes  Total critical care time (exclusive of procedural time) : 65 minutes  Critical care was necessary to treat or prevent imminent or life-threatening deterioration of the following conditions: circulatory failure.  Critical care was time spent personally by me on the following activities: evaluation of patient's response to treatment, ordering and performing treatments and interventions, pulse oximetry, examination of patient, ordering and review of laboratory studies, re-evaluation of patient's condition, obtaining history from patient or surrogate, ordering and review of radiographic studies and review of old charts.        Labs Reviewed   CBC W/ AUTO DIFFERENTIAL   COMPREHENSIVE METABOLIC PANEL   TROPONIN I   B-TYPE NATRIURETIC PEPTIDE   PROTIME-INR   TROPONIN I   MAGNESIUM   PHOSPHORUS     EKG Readings: (Independently Interpreted)   Initial Reading: No STEMI. Rhythm: Normal Sinus Rhythm. Heart Rate: 74.       Imaging Results    None       X-Rays:   Independently Interpreted Readings:   Chest X-Ray: Normal heart size.  No acute abnormalities.  No infiltrates.     Medical Decision Making:   History:   Old Medical Records: I decided to obtain old medical records.  Initial Assessment:    Patient presenting to ED complaining of left sided chest pain. she's  had this smothering feeling the last several days and has had to use more pillows than usual. she reports a hx of CHF. Her presentation is concerning for unstable angina. She states she did take her medications today . Will give patient nitroglycerin after labs are drawn including cardiac markers and chest x-ray.  Clinical Tests:   Lab Tests: Ordered and Reviewed  Radiological Study: Ordered and Reviewed  Medical Tests: Ordered and Reviewed            Scribe Attestation:   Scribe #1: I performed the above scribed service and the documentation accurately describes the services I performed. I attest to the accuracy of the note.    Attending Attestation:             Attending ED Notes:   1312  Patient complains of severe left-sided chest discomfort. She received Aspirin by paramedics. We will give her 3 Nitroglycerin after EKG for her active chest pain.    1601   Her chest discomfort has decreased with Nitroglycerin but not completely gone away. Repeat Troponin's and EKG's are negative. The constant here has been her continued hypertension with diastolic in 110 range. It is interesting that even with 2 doses of Labetalol that her pressure did not change nor her pulse but remained at 70. Will start patient on Cardene and will consult critical care.     Patient required multiple re-evaluations during her stay in the ED and more importantly during critical care component which she was placed on Cardene she subsequently was admitted to the critical care team             Clinical Impression:   Diagnoses of Chest pain and SOB (shortness of breath) were pertinent to this visit.      Disposition:   Disposition: Admitted  Condition: Critical                        Armand Parham MD  01/15/19 1206

## 2019-01-14 NOTE — ED NOTES
Kathie Quezada, a 58 y.o. female presents to the ED via EMS with CC CP      Patient identifiers verified verbally with armband and correct for Kathie Quezada.    LOC/ APPEARANCE: The patient is AAOx4. Pt is speaking appropriately, no slurred speech. Pt changed into hospital gown. Continuous cardiac monitor, cont pulse ox, and auto BP cuff applied to patient. Pt is clean and well groomed. No JVD visible. Pt reports pain level of 0. Pt updated on POC. Bed low and locked with side rails up x2, call bell in pt reach.  SKIN: Skin is warm dry and intact, and color is consistent with ethnicity. Capillary refill <3 seconds. No breakdown or brusing visible. Mucus membranes moist, acyanotic.  RESPIRATORY: Airway is open and patent. Respirations-spontaneous, unlabored, regular rate, equal bilaterally on inspiration and expiration. No accessory muscle use noted. Lungs clear to auscultation in all fields bilaterally anterior and posterior.   CARDIAC: Patient has regular heart rate.  No peripheral edema noted, and patient has c/o chest pain. Peripheral pulses present equal and strong throughout.  ABDOMEN: Nausea. Soft and non-tender to palpation with no distention noted. Normoactive bowel sounds x4 quadrants. Pt has no complaints of abnormal bowel movements, denies blood in stool. Pt reports normal appetite.   NEUROLOGIC: Eyes open spontaneously and facial expression symmetrical. Pt behavior appropriate to situation, and pt follows commands. Pt reports sensation present in all extremities when touched with a finger, denies any numbness or tingling. PERRLA  MUSCULOSKELETAL: Spontaneous movement noted to all extremities. Hand  equal and leg strength strong +5 bilaterally.   : No complaints of frequency, burning, urgency or blood in the urine. No complaints of incontinence.

## 2019-01-15 LAB
ALBUMIN SERPL BCP-MCNC: 2.6 G/DL
ALP SERPL-CCNC: 74 U/L
ALT SERPL W/O P-5'-P-CCNC: 10 U/L
ANION GAP SERPL CALC-SCNC: 9 MMOL/L
AST SERPL-CCNC: 26 U/L
BASOPHILS # BLD AUTO: 0.04 K/UL
BASOPHILS NFR BLD: 0.4 %
BILIRUB SERPL-MCNC: 0.5 MG/DL
BUN SERPL-MCNC: 22 MG/DL
CALCIUM SERPL-MCNC: 8 MG/DL
CHLORIDE SERPL-SCNC: 107 MMOL/L
CO2 SERPL-SCNC: 23 MMOL/L
CREAT SERPL-MCNC: 1.6 MG/DL
DIFFERENTIAL METHOD: ABNORMAL
EOSINOPHIL # BLD AUTO: 0.2 K/UL
EOSINOPHIL NFR BLD: 1.7 %
ERYTHROCYTE [DISTWIDTH] IN BLOOD BY AUTOMATED COUNT: 15.1 %
EST. GFR  (AFRICAN AMERICAN): 41.2 ML/MIN/1.73 M^2
EST. GFR  (NON AFRICAN AMERICAN): 35.8 ML/MIN/1.73 M^2
GLUCOSE SERPL-MCNC: 86 MG/DL
HCT VFR BLD AUTO: 37.1 %
HGB BLD-MCNC: 11.3 G/DL
IMM GRANULOCYTES # BLD AUTO: 0.04 K/UL
IMM GRANULOCYTES NFR BLD AUTO: 0.4 %
LYMPHOCYTES # BLD AUTO: 2 K/UL
LYMPHOCYTES NFR BLD: 21 %
MAGNESIUM SERPL-MCNC: 2.1 MG/DL
MCH RBC QN AUTO: 26.8 PG
MCHC RBC AUTO-ENTMCNC: 30.5 G/DL
MCV RBC AUTO: 88 FL
MONOCYTES # BLD AUTO: 0.8 K/UL
MONOCYTES NFR BLD: 8.7 %
NEUTROPHILS # BLD AUTO: 6.5 K/UL
NEUTROPHILS NFR BLD: 67.8 %
NRBC BLD-RTO: 0 /100 WBC
PHOSPHATE SERPL-MCNC: 4.1 MG/DL
PLATELET # BLD AUTO: 121 K/UL
PMV BLD AUTO: 11.3 FL
POCT GLUCOSE: 86 MG/DL (ref 70–110)
POTASSIUM SERPL-SCNC: 4 MMOL/L
PROT SERPL-MCNC: 6.8 G/DL
RBC # BLD AUTO: 4.21 M/UL
SODIUM SERPL-SCNC: 139 MMOL/L
WBC # BLD AUTO: 9.56 K/UL

## 2019-01-15 PROCEDURE — 25000003 PHARM REV CODE 250: Performed by: PHYSICIAN ASSISTANT

## 2019-01-15 PROCEDURE — 25000003 PHARM REV CODE 250: Performed by: STUDENT IN AN ORGANIZED HEALTH CARE EDUCATION/TRAINING PROGRAM

## 2019-01-15 PROCEDURE — 83735 ASSAY OF MAGNESIUM: CPT

## 2019-01-15 PROCEDURE — 85025 COMPLETE CBC W/AUTO DIFF WBC: CPT

## 2019-01-15 PROCEDURE — 80053 COMPREHEN METABOLIC PANEL: CPT

## 2019-01-15 PROCEDURE — 96366 THER/PROPH/DIAG IV INF ADDON: CPT

## 2019-01-15 PROCEDURE — 99233 SBSQ HOSP IP/OBS HIGH 50: CPT | Mod: GC,,, | Performed by: INTERNAL MEDICINE

## 2019-01-15 PROCEDURE — 82962 GLUCOSE BLOOD TEST: CPT

## 2019-01-15 PROCEDURE — 25000003 PHARM REV CODE 250: Performed by: NURSE PRACTITIONER

## 2019-01-15 PROCEDURE — 84100 ASSAY OF PHOSPHORUS: CPT

## 2019-01-15 PROCEDURE — 99233 PR SUBSEQUENT HOSPITAL CARE,LEVL III: ICD-10-PCS | Mod: GC,,, | Performed by: INTERNAL MEDICINE

## 2019-01-15 PROCEDURE — 11000001 HC ACUTE MED/SURG PRIVATE ROOM

## 2019-01-15 RX ORDER — CHLORTHALIDONE 25 MG/1
25 TABLET ORAL DAILY
Status: DISCONTINUED | OUTPATIENT
Start: 2019-01-15 | End: 2019-01-17 | Stop reason: HOSPADM

## 2019-01-15 RX ADMIN — CHLORTHALIDONE 25 MG: 25 TABLET ORAL at 10:01

## 2019-01-15 RX ADMIN — CARVEDILOL 25 MG: 25 TABLET, FILM COATED ORAL at 05:01

## 2019-01-15 RX ADMIN — LISINOPRIL 40 MG: 20 TABLET ORAL at 08:01

## 2019-01-15 RX ADMIN — LEVOTHYROXINE SODIUM 200 MCG: 0.11 TABLET ORAL at 08:01

## 2019-01-15 RX ADMIN — ESCITALOPRAM OXALATE 10 MG: 5 TABLET, FILM COATED ORAL at 08:01

## 2019-01-15 RX ADMIN — PANTOPRAZOLE SODIUM 40 MG: 40 TABLET, DELAYED RELEASE ORAL at 08:01

## 2019-01-15 RX ADMIN — AMLODIPINE BESYLATE 10 MG: 10 TABLET ORAL at 08:01

## 2019-01-15 RX ADMIN — CARVEDILOL 25 MG: 25 TABLET, FILM COATED ORAL at 07:01

## 2019-01-15 NOTE — HOSPITAL COURSE
Continue Nicardipine drip and continue Lisinopril and Coreg. Adding Amlodipine 10 mg and Chlorthalidone 25mg QD  . Head CT was order to rule out intracranial hemorrhage or stroke.

## 2019-01-15 NOTE — ED NOTES
Pt resting comfortably, no complaints voiced, Breakfast tray ordered. Belonging placed at bedside. Side rails up X2.

## 2019-01-15 NOTE — ED NOTES
Pt resting in bed. She is AAO. Reports chest discomfort rated 3/10. Resps appear even and unlabored. Cardene infusing at 7mg/hr. She denies SOB. Awaiting further orders. Will continue to monitor.

## 2019-01-15 NOTE — ASSESSMENT & PLAN NOTE
Hypertension Emergency with headache and blurred vision. Baseline Systolic at 140  - Start Nicardipine drip for 12hrs at 100cc/hr to keep systolic below 160  - Continue Coreg 25mg BID  - Continue Lisinopril 40mg QD  - Continue Amlodipine 10mg QD

## 2019-01-15 NOTE — ED NOTES
Pt resting in bed. Pt repositioned with assistance. She denies complaints. Cardene continues to infuse. Call light within reach. Side rails up x 2. Awaiting room assignment. Will continue to monitor.

## 2019-01-15 NOTE — H&P
[facility]  Critical Care Medicine  History & Physical    Patient Name: Kathie Quezada  MRN: [id number]  Admission Date: 1/14/2019  Hospital Length of Stay: 0 days  Code Status: Full Code  Attending Physician: Taylor Olguin DO   Primary Care Provider: Elan Price MD   Principal Problem: Hypertensive emergency    Subjective:     HPI:  Ms Quezada is 58 y.o. female with a history of CVA, medical issues including DM, CKD III, HTN (Lisinopril and Coreg), and CHF presents from patient had lab work scheduled this morning at the clinic when she was referred to the ED for hypertension of systolic of >200 with headache, CP and blurry vision. At ED patient complained of left sided chest pain that radiated down to her left arm. Patient describes the pain as sharp pain not dependent on movement. Associated symptoms include blurry vision, SOB, nausea, fatigue, headache, back pain, and increased frequency of urination. She had an episode of clear emesis this morning which she did not see any of her daily pills with her emesis. Patient said she is compliant with all of her medication, however going through her home meds she was not taking her chlorthalidone. Denies abdominal pain, hematuria, wounds, rashes, erythema, or sores. Patient describes cold-like symptoms starting 3 days ago which resolved. Patient did not have any sick contact or travel history recently. Patient is compliant with her medications and took all today except her insulin.   At ED, patient received labetalol and nitroglycerin. Patient was started on nicardipine drip and systolic went below 160.        Hospital/ICU Course:  Continue Nicardipine drip and continue Lisinopril and Coreg. Adding Amlodipine 10 mg. Head CT was order to rule out intracranial hemorrhage or stroke.       Past Medical History:   Diagnosis Date    CHF (congestive heart failure)     CKD stage G3a/A3, GFR 45-59 and albumin creatinine ratio >300 mg/g  5/16/2018    CVA (cerebral vascular accident) 2017    Essential hypertension 4/9/2018    Factor XI deficiency 1978    GIB (gastrointestinal bleeding) 10/18/2018    History of hepatitis C     s/p succesful Rx w/ Harvoni by Dr Herbert  - [id number] (cure) 2016    Hyperlipidemia     Mixed hyperlipidemia 4/9/2018    Postoperative hypothyroidism     Proteinuria 4/9/2018    Type 2 diabetes mellitus with circulatory disorder, with long-term current use of insulin 4/9/2018       Past Surgical History:   Procedure Laterality Date    BACK SURGERY      COLONOSCOPY  11/13/2015    Dr Chino Herbert - good prep. internal hemorrhoids. diverticulosis of sigmois. NO polyps. repeat due 2025    galbladder      HYSTERECTOMY      OOPHORECTOMY      one    TONSILLECTOMY         Review of patient's allergies indicates:   Allergen Reactions    Bactrim [sulfamethoxazole-trimethoprim] Hives    Codeine Hives    Penicillins Hives    Shellfish containing products Itching    Sulfa (sulfonamide antibiotics) Hives       Family History     Problem Relation (Age of Onset)    Diabetes type II Mother        Tobacco Use    Smoking status: Never Smoker    Smokeless tobacco: Never Used   Substance and Sexual Activity    Alcohol use: Yes     Alcohol/week: 2.4 oz     Types: 4 Cans of beer per week     Comment: weekly    Drug use: No    Sexual activity: Not on file      Review of Systems   Constitutional: Positive for activity change and chills. Negative for appetite change, diaphoresis, fatigue and fever.   HENT: Positive for congestion. Negative for sinus pressure.    Eyes: Positive for visual disturbance. Negative for photophobia.   Respiratory: Positive for cough. Negative for apnea, choking, chest tightness and shortness of breath.    Cardiovascular: Positive for chest pain. Negative for palpitations and leg swelling.   Gastrointestinal: Positive for diarrhea, nausea and vomiting. Negative for abdominal distention, abdominal pain and  constipation.   Genitourinary: Negative for difficulty urinating and dysuria.   Musculoskeletal: Positive for neck pain. Negative for back pain.   Neurological: Positive for numbness and headaches. Negative for dizziness, tremors, seizures, syncope and weakness.   Psychiatric/Behavioral: Negative for agitation and behavioral problems.     Objective:     Vital Signs (Most Recent):  Temp: 98.4 °F (36.9 °C) (01/14/19 1349)  Pulse: 80 (01/14/19 1750)  Resp: 20 (01/14/19 1750)  BP: (!) 149/65 (01/14/19 1750)  SpO2: 97 % (01/14/19 1750) Vital Signs (24h Range):  Temp:  [98.4 °F (36.9 °C)] 98.4 °F (36.9 °C)  Pulse:  [70-80] 80  Resp:  [16-21] 20  SpO2:  [94 %-99 %] 97 %  BP: (149-246)/() 149/65   Weight: 108.9 kg (240 lb)  Body mass index is 34.44 kg/m².    No intake or output data in the 24 hours ending 01/14/19 1842    Physical Exam   Constitutional: She is oriented to person, place, and time. She appears well-developed and well-nourished. No distress.   HENT:   Head: Normocephalic and atraumatic.   Eyes: Conjunctivae and EOM are normal. Pupils are equal, round, and reactive to light.   Neck: Normal range of motion. Neck supple. No JVD present.   Cardiovascular: Normal rate, regular rhythm, normal heart sounds and intact distal pulses.   No murmur heard.  Pulmonary/Chest: Effort normal and breath sounds normal. No respiratory distress. She has no wheezes.   Abdominal: Soft. Bowel sounds are normal. She exhibits no distension. There is no tenderness.   Musculoskeletal: Normal range of motion. She exhibits no edema, tenderness or deformity.   Neurological: She is alert and oriented to person, place, and time. She displays normal reflexes. No cranial nerve deficit or sensory deficit. Coordination normal.   Skin: Skin is warm and dry. Capillary refill takes less than 2 seconds. No erythema.   Psychiatric: She has a normal mood and affect. Her behavior is normal.   Nursing note and vitals reviewed.      Vents:      Lines/Drains/Airways     Peripheral Intravenous Line                 Peripheral IV - Single Lumen 01/14/19 1240 Left Antecubital less than 1 day         Peripheral IV - Single Lumen 01/14/19 1552 Right Forearm less than 1 day              Significant Labs:    CBC/Anemia Profile:  Recent Labs   Lab 01/14/19  1241   WBC 11.37   HGB 12.3   HCT 37.3      MCV 85   RDW 15.0*        Chemistries:  Recent Labs   Lab 01/14/19  1241      K 3.3*      CO2 26   BUN 25*   CREATININE 1.7*   CALCIUM 8.8   ALBUMIN 3.1*   PROT 7.7   BILITOT 0.5   ALKPHOS 93   ALT 13   AST 17   MG 1.8   PHOS 4.0       CMP:   Recent Labs   Lab 01/14/19  1241      K 3.3*      CO2 26   GLU 83   BUN 25*   CREATININE 1.7*   CALCIUM 8.8   PROT 7.7   ALBUMIN 3.1*   BILITOT 0.5   ALKPHOS 93   AST 17   ALT 13   ANIONGAP 9   EGFRNONAA 33.2*     Troponin:   Recent Labs   Lab 01/14/19  1241 01/14/19  1502   TROPONINI <0.006  <0.006 <0.006       Significant Imaging: CT: I have reviewed all pertinent results/findings within the past 24 hours and my personal findings are:   Pending  CXR: I have reviewed all pertinent results/findings within the past 24 hours and my personal findings are:    Linear density in the right mid lung zone may relate to scarring.  Otherwise,the lungs are clear, with normal appearance of pulmonary vasculature and no pleural effusion or pneumothorax.    Assessment/Plan:     Cardiac/Vascular   * Hypertensive emergency    Hypertension Emergency with headache and blurred vision. Baseline Systolic at 140  - Start Nicardipine drip for 12hrs at 100cc/hr to keep systolic below 160  - Continue Coreg 25mg BID  - Continue Lisinopril 40mg QD  - Continue Amlodipine 10mg QD     Essential hypertension    See HTN emergency     Renal/   CKD stage G3a/A3, GFR 45-59 and albumin creatinine ratio >300 mg/g    Cr at baseline at 1.7   - Continue monitor     Endocrine   Type 2 diabetes mellitus with circulatory disorder, with  long-term current use of insulin    Patient get 10 Unit of insulin daily. Check A1c  - LDSSI          Postoperative hypothyroidism    Hx of hypothyroidism  - Continue synthroid 200mcg daily       Other   Chest pain    Patient c/o left sided chest pain with numbness down the left hand.   - EKG did not reveal ST elevation   - troponin negative   - nitroglycerin PRN   - symptom resolving.          Critical Care Daily Checklist:    A: Awake: RASS Goal/Actual Goal:    Actual:     B: Spontaneous Breathing Trial Performed?     C: SAT & SBT Coordinated?  NA                      D: Delirium: CAM-ICU     E: Early Mobility Performed? No   F: Feeding Goal:    Status:     Current Diet Order   Procedures    Diet diabetic [facility]; 2000 Calorie     Order Specific Question:   Indicate patient location for additional diet options:     Answer:   [facility]     Order Specific Question:   Total calories:     Answer:   2000 Calorie      AS: Analgesia/Sedation none   T: Thromboembolic Prophylaxis enoxaparin 40mg   H: HOB > 300 Yes   U: Stress Ulcer Prophylaxis (if needed) PPI   G: Glucose Control LDSSI   B: Bowel Function     I: Indwelling Catheter (Lines & Sheth) Necessity 20G peripheral    D: De-escalation of Antimicrobials/Pharmacotherapies N/A    Plan for the day/ETD Keep Blood pressure systolic <160    Code Status:  Family/Goals of Care: Full Code       Critical secondary to Patient has a condition that poses threat to life and bodily function: Hypertensive Emergency      Critical care was time spent personally by me on the following activities: development of treatment plan with patient or surrogate and bedside caregivers, discussions with consultants, evaluation of patient's response to treatment, examination of patient, ordering and performing treatments and interventions, ordering and review of laboratory studies, ordering and review of radiographic studies, pulse oximetry, re-evaluation of patient's condition.  This critical care time did not overlap with that of any other provider or involve time for any procedures.     ESTELLA Clinton MD  Critical Care Medicine  [facility]

## 2019-01-15 NOTE — ED NOTES
Report received from Dilia MAYNARD RN. Care assumed. Pt resting comfortably and independently repositioned in stretcher with bed locked in lowest position for safety. NAD and patient states she feels a little better. Respirations even and unlabored and visible chest rise noted. Patient offered bathroom assistance and denies need at this time. Pt instructed to call if assistance is needed.. No needs at this time. Will continue to monitor. Call light within reach.

## 2019-01-15 NOTE — ED NOTES
LOC: The patient is awake, alert, and oriented to place, time, situation. Affect is appropriate.  Speech is appropriate and clear.     APPEARANCE: Patient resting comfortably in no acute distress.  Patient is clean and well groomed.    SKIN: The skin is warm and dry; color consistent with ethnicity.  Patient has normal skin turgor and moist mucus membranes.  Skin intact; no breakdown or bruising noted.     MUSCULOSKELETAL: Patient moving upper and lower extremities without difficulty.  Denies weakness.     RESPIRATORY: Airway is open and patent. Respirations spontaneous, even, easy, and non-labored.  Patient has a normal effort and rate.  No accessory muscle use noted. Denies cough.     CARDIAC:  Normal rhythm and rate noted. No complaints of chest pain.      ABDOMEN: Obese. Soft and non tender to palpation.  No distention noted.     NEUROLOGIC: Eyes open spontaneously.  Behavior appropriate to situation.  Follows commands; facial expression symmetrical.  Purposeful motor response noted; normal sensation in all extremities.

## 2019-01-15 NOTE — ASSESSMENT & PLAN NOTE
Hypertension Emergency with headache and blurred vision. Baseline Systolic at 140.   - Start Nicardipine drip for 12hrs at 100cc/hr to keep systolic below 160  - Continue Coreg 25mg BID  - Continue Lisinopril 40mg QD  - Continue Amlodipine 10mg QD  - Add Chlorthalidone 25mg QD

## 2019-01-15 NOTE — ED NOTES
Lovenox held. Pt concerned about history of Factor XI deficiency. Critical Care called and aware. Awaiting further orders.

## 2019-01-15 NOTE — ASSESSMENT & PLAN NOTE
Patient c/o left sided chest pain with numbness down the left hand.   - EKG did not reveal ST elevation   - troponin negative   - nitroglycerin PRN   - symptom resolving.

## 2019-01-15 NOTE — SUBJECTIVE & OBJECTIVE
Past Medical History:   Diagnosis Date    CHF (congestive heart failure)     CKD stage G3a/A3, GFR 45-59 and albumin creatinine ratio >300 mg/g 5/16/2018    CVA (cerebral vascular accident) 2017    Essential hypertension 4/9/2018    Factor XI deficiency 1978    GIB (gastrointestinal bleeding) 10/18/2018    History of hepatitis C     s/p succesful Rx w/ Harvoni by Dr Herbert  - SVR12 (cure) 2016    Hyperlipidemia     Mixed hyperlipidemia 4/9/2018    Postoperative hypothyroidism     Proteinuria 4/9/2018    Type 2 diabetes mellitus with circulatory disorder, with long-term current use of insulin 4/9/2018       Past Surgical History:   Procedure Laterality Date    BACK SURGERY      COLONOSCOPY  11/13/2015    Dr Chino torres prep. internal hemorrhoids. diverticulosis of sigmois. NO polyps. repeat due 2025    galbladder      HYSTERECTOMY      OOPHORECTOMY      one    TONSILLECTOMY         Review of patient's allergies indicates:   Allergen Reactions    Bactrim [sulfamethoxazole-trimethoprim] Hives    Codeine Hives    Penicillins Hives    Shellfish containing products Itching    Sulfa (sulfonamide antibiotics) Hives       Family History     Problem Relation (Age of Onset)    Diabetes type II Mother        Tobacco Use    Smoking status: Never Smoker    Smokeless tobacco: Never Used   Substance and Sexual Activity    Alcohol use: Yes     Alcohol/week: 2.4 oz     Types: 4 Cans of beer per week     Comment: weekly    Drug use: No    Sexual activity: Not on file      Review of Systems   Constitutional: Positive for activity change and chills. Negative for appetite change, diaphoresis, fatigue and fever.   HENT: Positive for congestion. Negative for sinus pressure.    Eyes: Positive for visual disturbance. Negative for photophobia.   Respiratory: Positive for cough. Negative for apnea, choking, chest tightness and shortness of breath.    Cardiovascular: Positive for chest pain. Negative for  palpitations and leg swelling.   Gastrointestinal: Positive for diarrhea, nausea and vomiting. Negative for abdominal distention, abdominal pain and constipation.   Genitourinary: Negative for difficulty urinating and dysuria.   Musculoskeletal: Positive for neck pain. Negative for back pain.   Neurological: Positive for numbness and headaches. Negative for dizziness, tremors, seizures, syncope and weakness.   Psychiatric/Behavioral: Negative for agitation and behavioral problems.     Objective:     Vital Signs (Most Recent):  Temp: 98.4 °F (36.9 °C) (01/14/19 1349)  Pulse: 80 (01/14/19 1750)  Resp: 20 (01/14/19 1750)  BP: (!) 149/65 (01/14/19 1750)  SpO2: 97 % (01/14/19 1750) Vital Signs (24h Range):  Temp:  [98.4 °F (36.9 °C)] 98.4 °F (36.9 °C)  Pulse:  [70-80] 80  Resp:  [16-21] 20  SpO2:  [94 %-99 %] 97 %  BP: (149-246)/() 149/65   Weight: 108.9 kg (240 lb)  Body mass index is 34.44 kg/m².    No intake or output data in the 24 hours ending 01/14/19 1842    Physical Exam   Constitutional: She is oriented to person, place, and time. She appears well-developed and well-nourished. No distress.   HENT:   Head: Normocephalic and atraumatic.   Eyes: Conjunctivae and EOM are normal. Pupils are equal, round, and reactive to light.   Neck: Normal range of motion. Neck supple. No JVD present.   Cardiovascular: Normal rate, regular rhythm, normal heart sounds and intact distal pulses.   No murmur heard.  Pulmonary/Chest: Effort normal and breath sounds normal. No respiratory distress. She has no wheezes.   Abdominal: Soft. Bowel sounds are normal. She exhibits no distension. There is no tenderness.   Musculoskeletal: Normal range of motion. She exhibits no edema, tenderness or deformity.   Neurological: She is alert and oriented to person, place, and time. She displays normal reflexes. No cranial nerve deficit or sensory deficit. Coordination normal.   Skin: Skin is warm and dry. Capillary refill takes less than 2  seconds. No erythema.   Psychiatric: She has a normal mood and affect. Her behavior is normal.   Nursing note and vitals reviewed.      Vents:     Lines/Drains/Airways     Peripheral Intravenous Line                 Peripheral IV - Single Lumen 01/14/19 1240 Left Antecubital less than 1 day         Peripheral IV - Single Lumen 01/14/19 1552 Right Forearm less than 1 day              Significant Labs:    CBC/Anemia Profile:  Recent Labs   Lab 01/14/19  1241   WBC 11.37   HGB 12.3   HCT 37.3      MCV 85   RDW 15.0*        Chemistries:  Recent Labs   Lab 01/14/19  1241      K 3.3*      CO2 26   BUN 25*   CREATININE 1.7*   CALCIUM 8.8   ALBUMIN 3.1*   PROT 7.7   BILITOT 0.5   ALKPHOS 93   ALT 13   AST 17   MG 1.8   PHOS 4.0       CMP:   Recent Labs   Lab 01/14/19  1241      K 3.3*      CO2 26   GLU 83   BUN 25*   CREATININE 1.7*   CALCIUM 8.8   PROT 7.7   ALBUMIN 3.1*   BILITOT 0.5   ALKPHOS 93   AST 17   ALT 13   ANIONGAP 9   EGFRNONAA 33.2*     Troponin:   Recent Labs   Lab 01/14/19  1241 01/14/19  1502   TROPONINI <0.006  <0.006 <0.006       Significant Imaging: CT: I have reviewed all pertinent results/findings within the past 24 hours and my personal findings are:   Pending  CXR: I have reviewed all pertinent results/findings within the past 24 hours and my personal findings are:    Linear density in the right mid lung zone may relate to scarring.  Otherwise,the lungs are clear, with normal appearance of pulmonary vasculature and no pleural effusion or pneumothorax.

## 2019-01-15 NOTE — SUBJECTIVE & OBJECTIVE
Interval History/Significant Events: NAEON, patient's systolic BP ran around 140-150 with Cardene drip. Patient does not c/o CP but does still c/o left arm numbness due to q15 minute BP check. This morning patient continues to have headache, blurry vision and mild abdominal ache. Adding Chlorthalidone to the regiment.  Patient denied SOB, cough, congestion, nauseas, vomiting, and dizziness.     Review of Systems   Constitutional: Positive for activity change. Negative for appetite change, chills, diaphoresis, fatigue and fever.   HENT: Negative for congestion and sinus pressure.    Eyes: Positive for visual disturbance. Negative for photophobia.   Respiratory: Negative for apnea, cough, choking, chest tightness and shortness of breath.    Cardiovascular: Negative for chest pain, palpitations and leg swelling.   Gastrointestinal: Negative for abdominal distention, abdominal pain, constipation, diarrhea, nausea and vomiting.   Genitourinary: Negative for difficulty urinating and dysuria.   Musculoskeletal: Positive for neck pain. Negative for back pain.   Neurological: Positive for numbness and headaches. Negative for dizziness, tremors, seizures, syncope and weakness.   Psychiatric/Behavioral: Negative for agitation and behavioral problems.     Objective:     Vital Signs (Most Recent):  Temp: 97.9 °F (36.6 °C) (01/15/19 0732)  Pulse: 74 (01/15/19 0926)  Resp: (!) 21 (01/15/19 0926)  BP: (!) 179/83 (01/15/19 0926)  SpO2: 96 % (01/15/19 0926) Vital Signs (24h Range):  Temp:  [97.9 °F (36.6 °C)-98.4 °F (36.9 °C)] 97.9 °F (36.6 °C)  Pulse:  [70-80] 74  Resp:  [13-26] 21  SpO2:  [91 %-99 %] 96 %  BP: (133-246)/() 179/83   Weight: 108.9 kg (240 lb)  Body mass index is 34.44 kg/m².    No intake or output data in the 24 hours ending 01/15/19 1008    Physical Exam   Constitutional: She is oriented to person, place, and time. She appears well-developed and well-nourished. No distress.   HENT:   Head: Normocephalic and  atraumatic.   Eyes: Conjunctivae and EOM are normal. Pupils are equal, round, and reactive to light.   Neck: Normal range of motion. Neck supple. No JVD present.   Cardiovascular: Normal rate, regular rhythm, normal heart sounds and intact distal pulses.   No murmur heard.  Pulmonary/Chest: Effort normal and breath sounds normal. No respiratory distress. She has no wheezes.   Abdominal: Soft. Bowel sounds are normal. She exhibits no distension. There is no tenderness.   Musculoskeletal: Normal range of motion. She exhibits no edema, tenderness or deformity.   Neurological: She is alert and oriented to person, place, and time. She displays normal reflexes. No cranial nerve deficit or sensory deficit. Coordination normal.   Skin: Skin is warm and dry. Capillary refill takes less than 2 seconds. No erythema.   Psychiatric: She has a normal mood and affect. Her behavior is normal.   Nursing note and vitals reviewed.      Vents:     Lines/Drains/Airways     Peripheral Intravenous Line                 Peripheral IV - Single Lumen 01/14/19 1240 Left Antecubital less than 1 day         Peripheral IV - Single Lumen 01/14/19 1552 Right Forearm less than 1 day         Peripheral IV - Single Lumen 01/14/19 1928 Left Forearm less than 1 day              Significant Labs:    CBC/Anemia Profile:  Recent Labs   Lab 01/14/19  1241 01/14/19 1928 01/15/19  0434   WBC 11.37 11.08 9.56   HGB 12.3 11.7* 11.3*   HCT 37.3 35.5* 37.1    170 121*   MCV 85 87 88   RDW 15.0* 14.8* 15.1*        Chemistries:  Recent Labs   Lab 01/14/19  1241 01/15/19  0434    139   K 3.3* 4.0    107   CO2 26 23   BUN 25* 22*   CREATININE 1.7* 1.6*   CALCIUM 8.8 8.0*   ALBUMIN 3.1* 2.6*   PROT 7.7 6.8   BILITOT 0.5 0.5   ALKPHOS 93 74   ALT 13 10   AST 17 26   MG 1.8 2.1   PHOS 4.0 4.1       CMP:   Recent Labs   Lab 01/14/19  1241 01/15/19  0434    139   K 3.3* 4.0    107   CO2 26 23   GLU 83 86   BUN 25* 22*   CREATININE 1.7* 1.6*    CALCIUM 8.8 8.0*   PROT 7.7 6.8   ALBUMIN 3.1* 2.6*   BILITOT 0.5 0.5   ALKPHOS 93 74   AST 17 26   ALT 13 10   ANIONGAP 9 9   EGFRNONAA 33.2* 35.8*       Significant Imaging:  CT: I have reviewed all pertinent results/findings within the past 24 hours and my personal findings are:  No acute abnormality

## 2019-01-15 NOTE — PROGRESS NOTES
[facility]  Critical Care Medicine  Progress Note    Patient Name: Kathie Quezada  MRN: [id number]  Admission Date: 1/14/2019  Hospital Length of Stay: 1 days  Code Status: Full Code  Attending Provider: Taylor Olguin DO  Primary Care Provider: Elan Price MD   Principal Problem: Hypertensive emergency    Subjective:     HPI:  Ms Quezada is 58 y.o. female with a history of CVA, medical issues including DM, CKD III, HTN (Lisinopril and Coreg), and CHF presents from patient had lab work scheduled this morning at the clinic when she was referred to the ED for hypertension of systolic of >200 with headache, CP and blurry vision. At ED patient complained of left sided chest pain that radiated down to her left arm. Patient describes the pain as sharp pain not dependent on movement. Associated symptoms include blurry vision, SOB, nausea, fatigue, headache, back pain, and increased frequency of urination. She had an episode of clear emesis this morning which she did not see any of her daily pills with her emesis. Patient said she is compliant with all of her medication, however going through her home meds she was not taking her chlorthalidone. Denies abdominal pain, hematuria, wounds, rashes, erythema, or sores. Patient describes cold-like symptoms starting 3 days ago which resolved. Patient did not have any sick contact or travel history recently. Patient is compliant with her medications and took all today except her insulin.   At ED, patient received labetalol and nitroglycerin. Patient was started on nicardipine drip and systolic went below 160.        Hospital/ICU Course:  Continue Nicardipine drip and continue Lisinopril and Coreg. Adding Amlodipine 10 mg and Chlorthalidone 25mg QD  . Head CT was order to rule out intracranial hemorrhage or stroke.      Interval History/Significant Events: NAEON, patient's systolic BP ran around 140-150 with Cardene drip. Patient does not c/o CP but  does still c/o left arm numbness due to q15 minute BP check. This morning patient continues to have headache, blurry vision and mild abdominal ache. Adding Chlorthalidone to the regiment.  Patient denied SOB, cough, congestion, nauseas, vomiting, and dizziness.     Review of Systems   Constitutional: Positive for activity change. Negative for appetite change, chills, diaphoresis, fatigue and fever.   HENT: Negative for congestion and sinus pressure.    Eyes: Positive for visual disturbance. Negative for photophobia.   Respiratory: Negative for apnea, cough, choking, chest tightness and shortness of breath.    Cardiovascular: Negative for chest pain, palpitations and leg swelling.   Gastrointestinal: Negative for abdominal distention, abdominal pain, constipation, diarrhea, nausea and vomiting.   Genitourinary: Negative for difficulty urinating and dysuria.   Musculoskeletal: Positive for neck pain. Negative for back pain.   Neurological: Positive for numbness and headaches. Negative for dizziness, tremors, seizures, syncope and weakness.   Psychiatric/Behavioral: Negative for agitation and behavioral problems.     Objective:     Vital Signs (Most Recent):  Temp: 97.9 °F (36.6 °C) (01/15/19 0732)  Pulse: 74 (01/15/19 0926)  Resp: (!) 21 (01/15/19 0926)  BP: (!) 179/83 (01/15/19 0926)  SpO2: 96 % (01/15/19 0926) Vital Signs (24h Range):  Temp:  [97.9 °F (36.6 °C)-98.4 °F (36.9 °C)] 97.9 °F (36.6 °C)  Pulse:  [70-80] 74  Resp:  [13-26] 21  SpO2:  [91 %-99 %] 96 %  BP: (133-246)/() 179/83   Weight: 108.9 kg (240 lb)  Body mass index is 34.44 kg/m².    No intake or output data in the 24 hours ending 01/15/19 1008    Physical Exam   Constitutional: She is oriented to person, place, and time. She appears well-developed and well-nourished. No distress.   HENT:   Head: Normocephalic and atraumatic.   Eyes: Conjunctivae and EOM are normal. Pupils are equal, round, and reactive to light.   Neck: Normal range of motion.  Neck supple. No JVD present.   Cardiovascular: Normal rate, regular rhythm, normal heart sounds and intact distal pulses.   No murmur heard.  Pulmonary/Chest: Effort normal and breath sounds normal. No respiratory distress. She has no wheezes.   Abdominal: Soft. Bowel sounds are normal. She exhibits no distension. There is no tenderness.   Musculoskeletal: Normal range of motion. She exhibits no edema, tenderness or deformity.   Neurological: She is alert and oriented to person, place, and time. She displays normal reflexes. No cranial nerve deficit or sensory deficit. Coordination normal.   Skin: Skin is warm and dry. Capillary refill takes less than 2 seconds. No erythema.   Psychiatric: She has a normal mood and affect. Her behavior is normal.   Nursing note and vitals reviewed.      Vents:     Lines/Drains/Airways     Peripheral Intravenous Line                 Peripheral IV - Single Lumen 01/14/19 1240 Left Antecubital less than 1 day         Peripheral IV - Single Lumen 01/14/19 1552 Right Forearm less than 1 day         Peripheral IV - Single Lumen 01/14/19 1928 Left Forearm less than 1 day              Significant Labs:    CBC/Anemia Profile:  Recent Labs   Lab 01/14/19  1241 01/14/19  1928 01/15/19  0434   WBC 11.37 11.08 9.56   HGB 12.3 11.7* 11.3*   HCT 37.3 35.5* 37.1    170 121*   MCV 85 87 88   RDW 15.0* 14.8* 15.1*        Chemistries:  Recent Labs   Lab 01/14/19  1241 01/15/19  0434    139   K 3.3* 4.0    107   CO2 26 23   BUN 25* 22*   CREATININE 1.7* 1.6*   CALCIUM 8.8 8.0*   ALBUMIN 3.1* 2.6*   PROT 7.7 6.8   BILITOT 0.5 0.5   ALKPHOS 93 74   ALT 13 10   AST 17 26   MG 1.8 2.1   PHOS 4.0 4.1       CMP:   Recent Labs   Lab 01/14/19  1241 01/15/19  0434    139   K 3.3* 4.0    107   CO2 26 23   GLU 83 86   BUN 25* 22*   CREATININE 1.7* 1.6*   CALCIUM 8.8 8.0*   PROT 7.7 6.8   ALBUMIN 3.1* 2.6*   BILITOT 0.5 0.5   ALKPHOS 93 74   AST 17 26   ALT 13 10   ANIONGAP 9 9    EGFRNONAA 33.2* 35.8*       Significant Imaging:  CT: I have reviewed all pertinent results/findings within the past 24 hours and my personal findings are:  No acute abnormality      ABG  No results for input(s): PH, PO2, PCO2, HCO3, BE in the last 168 hours.  Assessment/Plan:     Cardiac/Vascular   * Hypertensive emergency    Hypertension Emergency with headache and blurred vision. Baseline Systolic at 140.   - Start Nicardipine drip for 12hrs at 100cc/hr to keep systolic below 160  - Continue Coreg 25mg BID  - Continue Lisinopril 40mg QD  - Continue Amlodipine 10mg QD  - Add Chlorthalidone 25mg QD     Essential hypertension    See HTN emergency     Renal/   CKD stage G3a/A3, GFR 45-59 and albumin creatinine ratio >300 mg/g    Cr at baseline at 1.7   - Continue monitor     Endocrine   Type 2 diabetes mellitus with circulatory disorder, with long-term current use of insulin    Patient get 10 Unit of insulin daily.  A1c at 5   - LDSSI          Postoperative hypothyroidism    Hx of hypothyroidism  - Continue synthroid 200mcg daily       Other   Chest pain    Patient c/o left sided chest pain with numbness down the left hand.   - EKG did not reveal ST elevation   - troponin negative   - nitroglycerin PRN   - symptom resolved        Critical Care Daily Checklist:    A: Awake: RASS Goal/Actual Goal:    Actual:     B: Spontaneous Breathing Trial Performed?     C: SAT & SBT Coordinated?  N/A                   D: Delirium: CAM-ICU     E: Early Mobility Performed? 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    H: HOB > 300 Yes   U: Stress Ulcer Prophylaxis (if  needed) PPI   G: Glucose Control LDSSI   B: Bowel Function     I: Indwelling Catheter (Lines & Sheth) Necessity 20G peripheral    D: De-escalation of Antimicrobials/Pharmacotherapies None`    Plan for the day/ETD Decrease BP systolic below 160    Code Status:  Family/Goals of Care: Full Code       Critical secondary to Patient has a condition that poses threat to life and bodily function: Hypertension Emergency       Critical care was time spent personally by me on the following activities: development of treatment plan with patient or surrogate and bedside caregivers, discussions with consultants, evaluation of patient's response to treatment, examination of patient, ordering and performing treatments and interventions, ordering and review of laboratory studies, ordering and review of radiographic studies, pulse oximetry, re-evaluation of patient's condition. This critical care time did not overlap with that of any other provider or involve time for any procedures.     ESTELLA Clinton MD  Critical Care Medicine  [facility]

## 2019-01-15 NOTE — HPI
Ms Quezada is 58 y.o. female with a history of CVA, medical issues including DM, CKD III, HTN (Lisinopril and Coreg), and CHF presents from patient had lab work scheduled this morning at the clinic when she was referred to the ED for hypertension of systolic of >200 with headache, CP and blurry vision. At ED patient complained of left sided chest pain that radiated down to her left arm. Patient describes the pain as sharp pain not dependent on movement. Associated symptoms include blurry vision, SOB, nausea, fatigue, headache, back pain, and increased frequency of urination. She had an episode of clear emesis this morning which she did not see any of her daily pills with her emesis. Patient said she is compliant with all of her medication, however going through her home meds she was not taking her chlorthalidone. Denies abdominal pain, hematuria, wounds, rashes, erythema, or sores. Patient describes cold-like symptoms starting 3 days ago which resolved. Patient did not have any sick contact or travel history recently. Patient is compliant with her medications and took all today except her insulin.   At ED, patient received labetalol and nitroglycerin. Patient was started on nicardipine drip and systolic went below 160.

## 2019-01-15 NOTE — ASSESSMENT & PLAN NOTE
Patient c/o left sided chest pain with numbness down the left hand.   - EKG did not reveal ST elevation   - troponin negative   - nitroglycerin PRN   - symptom resolved

## 2019-01-16 LAB
ALBUMIN SERPL BCP-MCNC: 2.5 G/DL
ALP SERPL-CCNC: 72 U/L
ALT SERPL W/O P-5'-P-CCNC: 9 U/L
ANION GAP SERPL CALC-SCNC: 10 MMOL/L
ANION GAP SERPL CALC-SCNC: 9 MMOL/L
ASCENDING AORTA: 3.48 CM
AST SERPL-CCNC: 12 U/L
AV INDEX (PROSTH): 0.8
AV MEAN GRADIENT: 2.14 MMHG
AV PEAK GRADIENT: 4.16 MMHG
AV VALVE AREA: 3.33 CM2
BACTERIA #/AREA URNS AUTO: NORMAL /HPF
BASOPHILS # BLD AUTO: 0.05 K/UL
BASOPHILS NFR BLD: 0.5 %
BILIRUB SERPL-MCNC: 0.4 MG/DL
BILIRUB UR QL STRIP: NEGATIVE
BSA FOR ECHO PROCEDURE: 2.32 M2
BUN SERPL-MCNC: 24 MG/DL
BUN SERPL-MCNC: 25 MG/DL
CALCIUM SERPL-MCNC: 8.2 MG/DL
CALCIUM SERPL-MCNC: 8.6 MG/DL
CHLORIDE SERPL-SCNC: 107 MMOL/L
CHLORIDE SERPL-SCNC: 108 MMOL/L
CLARITY UR REFRACT.AUTO: CLEAR
CO2 SERPL-SCNC: 25 MMOL/L
CO2 SERPL-SCNC: 25 MMOL/L
COLOR UR AUTO: YELLOW
CREAT SERPL-MCNC: 1.9 MG/DL
CREAT SERPL-MCNC: 2.1 MG/DL
CV ECHO LV RWT: 0.46 CM
DIFFERENTIAL METHOD: ABNORMAL
DOP CALC AO PEAK VEL: 1.02 M/S
DOP CALC AO VTI: 19.65 CM
DOP CALC LVOT AREA: 4.15 CM2
DOP CALC LVOT DIAMETER: 2.3 CM
DOP CALC LVOT STROKE VOLUME: 65.53 CM3
DOP CALCLVOT PEAK VEL VTI: 15.78 CM
E WAVE DECELERATION TIME: 259.19 MSEC
E/A RATIO: 0.71
E/E' RATIO: 12.18
ECHO LV POSTERIOR WALL: 1.2 CM (ref 0.6–1.1)
EOSINOPHIL # BLD AUTO: 0.2 K/UL
EOSINOPHIL NFR BLD: 2 %
ERYTHROCYTE [DISTWIDTH] IN BLOOD BY AUTOMATED COUNT: 14.9 %
EST. GFR  (AFRICAN AMERICAN): 29.7 ML/MIN/1.73 M^2
EST. GFR  (AFRICAN AMERICAN): 33.5 ML/MIN/1.73 M^2
EST. GFR  (NON AFRICAN AMERICAN): 25.7 ML/MIN/1.73 M^2
EST. GFR  (NON AFRICAN AMERICAN): 29.1 ML/MIN/1.73 M^2
FRACTIONAL SHORTENING: 24 % (ref 28–44)
GLUCOSE SERPL-MCNC: 114 MG/DL
GLUCOSE SERPL-MCNC: 122 MG/DL
GLUCOSE UR QL STRIP: ABNORMAL
HCT VFR BLD AUTO: 37 %
HGB BLD-MCNC: 11.7 G/DL
HGB UR QL STRIP: ABNORMAL
HYALINE CASTS UR QL AUTO: 0 /LPF
IMM GRANULOCYTES # BLD AUTO: 0.04 K/UL
IMM GRANULOCYTES NFR BLD AUTO: 0.4 %
INTERVENTRICULAR SEPTUM: 1.1 CM (ref 0.6–1.1)
IVRT: 0.09 MSEC
KETONES UR QL STRIP: NEGATIVE
LA MAJOR: 6.99 CM
LA MINOR: 6.46 CM
LA WIDTH: 4.57 CM
LEFT ATRIUM SIZE: 4.33 CM
LEFT ATRIUM VOLUME INDEX: 50.1 ML/M2
LEFT ATRIUM VOLUME: 112.94 CM3
LEFT INTERNAL DIMENSION IN SYSTOLE: 3.96 CM (ref 2.1–4)
LEFT VENTRICLE DIASTOLIC VOLUME INDEX: 70.03 ML/M2
LEFT VENTRICLE DIASTOLIC VOLUME: 157.95 ML
LEFT VENTRICLE MASS INDEX: 104 G/M2
LEFT VENTRICLE SYSTOLIC VOLUME INDEX: 30.2 ML/M2
LEFT VENTRICLE SYSTOLIC VOLUME: 68.21 ML
LEFT VENTRICULAR INTERNAL DIMENSION IN DIASTOLE: 5.2 CM (ref 3.5–6)
LEFT VENTRICULAR MASS: 234.61 G
LEUKOCYTE ESTERASE UR QL STRIP: NEGATIVE
LV LATERAL E/E' RATIO: 13.4
LV SEPTAL E/E' RATIO: 11.17
LYMPHOCYTES # BLD AUTO: 1.8 K/UL
LYMPHOCYTES NFR BLD: 18.6 %
MAGNESIUM SERPL-MCNC: 1.7 MG/DL
MCH RBC QN AUTO: 27.7 PG
MCHC RBC AUTO-ENTMCNC: 31.6 G/DL
MCV RBC AUTO: 88 FL
MICROSCOPIC COMMENT: NORMAL
MONOCYTES # BLD AUTO: 0.9 K/UL
MONOCYTES NFR BLD: 8.8 %
MV PEAK A VEL: 0.94 M/S
MV PEAK E VEL: 0.67 M/S
NEUTROPHILS # BLD AUTO: 6.8 K/UL
NEUTROPHILS NFR BLD: 69.7 %
NITRITE UR QL STRIP: NEGATIVE
NRBC BLD-RTO: 0 /100 WBC
PH UR STRIP: 6 [PH] (ref 5–8)
PHOSPHATE SERPL-MCNC: 3.8 MG/DL
PISA TR MAX VEL: 2.73 M/S
PLATELET # BLD AUTO: 137 K/UL
PMV BLD AUTO: 11.7 FL
POCT GLUCOSE: 102 MG/DL (ref 70–110)
POCT GLUCOSE: 120 MG/DL (ref 70–110)
POCT GLUCOSE: 129 MG/DL (ref 70–110)
POCT GLUCOSE: 130 MG/DL (ref 70–110)
POTASSIUM SERPL-SCNC: 3.2 MMOL/L
POTASSIUM SERPL-SCNC: 4.2 MMOL/L
PROT SERPL-MCNC: 6.2 G/DL
PROT UR QL STRIP: ABNORMAL
PULM VEIN S/D RATIO: 1.84
PV PEAK D VEL: 0.25 M/S
PV PEAK S VEL: 0.46 M/S
RA MAJOR: 5.95 CM
RA PRESSURE: 3 MMHG
RA WIDTH: 3.9 CM
RBC # BLD AUTO: 4.22 M/UL
RBC #/AREA URNS AUTO: 2 /HPF (ref 0–4)
RIGHT VENTRICULAR END-DIASTOLIC DIMENSION: 3.92 CM
RV TISSUE DOPPLER FREE WALL SYSTOLIC VELOCITY 1 (APICAL 4 CHAMBER VIEW): 12.63 M/S
SINUS: 3.01 CM
SODIUM SERPL-SCNC: 141 MMOL/L
SODIUM SERPL-SCNC: 143 MMOL/L
SP GR UR STRIP: 1.01 (ref 1–1.03)
SQUAMOUS #/AREA URNS AUTO: 0 /HPF
STJ: 2.4 CM
TDI LATERAL: 0.05
TDI SEPTAL: 0.06
TDI: 0.06
TR MAX PG: 29.81 MMHG
TRICUSPID ANNULAR PLANE SYSTOLIC EXCURSION: 2.24 CM
TV REST PULMONARY ARTERY PRESSURE: 33 MMHG
URN SPEC COLLECT METH UR: ABNORMAL
WBC # BLD AUTO: 9.69 K/UL
WBC #/AREA URNS AUTO: 0 /HPF (ref 0–5)

## 2019-01-16 PROCEDURE — 99232 SBSQ HOSP IP/OBS MODERATE 35: CPT | Mod: GC,,, | Performed by: HOSPITALIST

## 2019-01-16 PROCEDURE — 83735 ASSAY OF MAGNESIUM: CPT

## 2019-01-16 PROCEDURE — 36415 COLL VENOUS BLD VENIPUNCTURE: CPT

## 2019-01-16 PROCEDURE — 11000001 HC ACUTE MED/SURG PRIVATE ROOM

## 2019-01-16 PROCEDURE — 80053 COMPREHEN METABOLIC PANEL: CPT

## 2019-01-16 PROCEDURE — 25000003 PHARM REV CODE 250: Performed by: STUDENT IN AN ORGANIZED HEALTH CARE EDUCATION/TRAINING PROGRAM

## 2019-01-16 PROCEDURE — 84100 ASSAY OF PHOSPHORUS: CPT

## 2019-01-16 PROCEDURE — 81001 URINALYSIS AUTO W/SCOPE: CPT

## 2019-01-16 PROCEDURE — 25000003 PHARM REV CODE 250: Performed by: HOSPITALIST

## 2019-01-16 PROCEDURE — 85025 COMPLETE CBC W/AUTO DIFF WBC: CPT

## 2019-01-16 PROCEDURE — 80048 BASIC METABOLIC PNL TOTAL CA: CPT

## 2019-01-16 PROCEDURE — 25000003 PHARM REV CODE 250: Performed by: NURSE PRACTITIONER

## 2019-01-16 PROCEDURE — 99232 PR SUBSEQUENT HOSPITAL CARE,LEVL II: ICD-10-PCS | Mod: GC,,, | Performed by: HOSPITALIST

## 2019-01-16 RX ORDER — SILVER SULFADIAZINE 10 G/1000G
1 CREAM TOPICAL 2 TIMES DAILY
COMMUNITY
End: 2019-02-07 | Stop reason: ALTCHOICE

## 2019-01-16 RX ORDER — BUTALBITAL, ACETAMINOPHEN AND CAFFEINE 50; 325; 40 MG/1; MG/1; MG/1
1 TABLET ORAL ONCE
Status: COMPLETED | OUTPATIENT
Start: 2019-01-16 | End: 2019-01-16

## 2019-01-16 RX ORDER — POTASSIUM CHLORIDE 20 MEQ/1
40 TABLET, EXTENDED RELEASE ORAL EVERY 4 HOURS
Status: COMPLETED | OUTPATIENT
Start: 2019-01-16 | End: 2019-01-16

## 2019-01-16 RX ADMIN — LEVOTHYROXINE SODIUM 200 MCG: 0.11 TABLET ORAL at 06:01

## 2019-01-16 RX ADMIN — POTASSIUM CHLORIDE 40 MEQ: 1500 TABLET, EXTENDED RELEASE ORAL at 09:01

## 2019-01-16 RX ADMIN — BUTALBITAL, ACETAMINOPHEN AND CAFFEINE 1 TABLET: 50; 325; 40 TABLET ORAL at 01:01

## 2019-01-16 RX ADMIN — AMLODIPINE BESYLATE 10 MG: 10 TABLET ORAL at 09:01

## 2019-01-16 RX ADMIN — RAMELTEON 8 MG: 8 TABLET, FILM COATED ORAL at 12:01

## 2019-01-16 RX ADMIN — CARVEDILOL 25 MG: 25 TABLET, FILM COATED ORAL at 09:01

## 2019-01-16 RX ADMIN — PANTOPRAZOLE SODIUM 40 MG: 40 TABLET, DELAYED RELEASE ORAL at 09:01

## 2019-01-16 RX ADMIN — POTASSIUM CHLORIDE 40 MEQ: 1500 TABLET, EXTENDED RELEASE ORAL at 01:01

## 2019-01-16 RX ADMIN — LISINOPRIL 40 MG: 20 TABLET ORAL at 09:01

## 2019-01-16 RX ADMIN — ESCITALOPRAM OXALATE 10 MG: 5 TABLET, FILM COATED ORAL at 09:01

## 2019-01-16 RX ADMIN — RAMELTEON 8 MG: 8 TABLET, FILM COATED ORAL at 08:01

## 2019-01-16 RX ADMIN — CARVEDILOL 25 MG: 25 TABLET, FILM COATED ORAL at 05:01

## 2019-01-16 NOTE — RESIDENT HANDOFF
Handoff     Primary Team: [facility] [place] [place] CRITICAL CARE MEDICINE Room Number: ED 21/21     Patient Name: Kathie Quezada MRN: [id number]     Date of Birth: [id number] Allergies: Bactrim [sulfamethoxazole-trimethoprim]; Codeine; Penicillins; Shellfish containing products; and Sulfa (sulfonamide antibiotics)     Age: 58 y.o. Admit Date: 1/14/2019     Sex: female  BMI: Body mass index is 34.44 kg/m².     Code Status: Full Code        Illness Level (current clinical status): Watcher - No    Reason for Admission: Hypertensive emergency    Brief HPI (pertinent PMH and diagnosis or differential diagnosis): Ms Quezada is 58 y.o. female with a history of CVA, medical issues including DM, CKD III, HTN (Lisinopril and Coreg), and CHF presents to the ED for hypertension of systolic of >200 with headache, CP and blurry vision. At ED patient complained of left sided chest pain that radiated down to her left arm.  Associated symptoms include blurry vision, SOB, nausea, fatigue, headache, back pain, and increased frequency of urination. At ED, patient received labetalol and nitroglycerin. Patient was started on nicardipine drip and systolic went below 160.      Procedure Date: N/A    Hospital Course (updated, brief assessment by system or problem, significant events): Continued Nicardipine drip and continue Lisinopril and Coreg. Adding Amlodipine 10 mg and Chlorthalidone 25mg QD. Stopped Cardene drip and pt BP stable at systolic of 140 after 2hr.  Head CT was order to rule out intracranial hemorrhage or stroke.          Tasks (specific, using if-then statements): PO regeiment of home BP medication.     Contingency Plan (special circumstances anticipated and plan): ICU if patient get Hypertensive emergency    Estimated Discharge Date:     Discharge Disposition: Home or Self Care

## 2019-01-16 NOTE — HOSPITAL COURSE
Patient was admitted to ICU started on cardene gtt. She was continued on lisinopril and coreg along with Chlorthalidone. CTH was negative for acute intracranial pathology. EKG w/NSR and negative for ischemic changes. Trop negative. Patient's blood pressure and symptoms improved and Cardene gtt was discontinued. She was stepped down to hospital medicine 1/16.

## 2019-01-16 NOTE — HPI
Ms Quezada is 58 y.o. female with a history of CVA, IDDM, CKD III, HTN who presents from the Lab work scheduled this morning at the clinic when she was referred to the ED for hypertension of systolic of >200 with headache, CP and blurry vision. At ED patient complained of left sided chest pain that radiated down to her left arm. Patient describes the pain as sharp pain not dependent on movement. Associated symptoms include blurry vision, SOB, nausea, fatigue, headache, back pain, and increased frequency of urination. She had an episode of clear emesis this morning which she did not see any of her daily pills with her emesis. Patient said she is compliant with all of her medication, however going through her home meds she was not taking her chlorthalidone. Denies abdominal pain, hematuria, wounds, rashes, erythema, or sores. Patient describes cold-like symptoms starting 3 days ago which resolved. Patient did not have any sick contact or travel history recently. Patient is compliant with her medications and took all today except her insulin.   At ED, patient received labetalol and nitroglycerin. Patient was started on nicardipine drip and systolic went below 160.

## 2019-01-16 NOTE — SUBJECTIVE & OBJECTIVE
Interval History: No acute events overnight. Patient with SBPs 150s-160s this am. Denies chest pain or shortness of breath. Does continue to have HA.     Review of Systems   Constitutional: Negative for chills, fatigue and fever.   Respiratory: Negative for cough, choking, chest tightness and shortness of breath.    Cardiovascular: Negative for chest pain, palpitations and leg swelling.   Gastrointestinal: Negative for abdominal distention, abdominal pain, constipation, diarrhea, nausea and vomiting.   Neurological: Positive for headaches. Negative for dizziness, tremors, syncope and weakness.     Objective:     Vital Signs (Most Recent):  Temp: 97.7 °F (36.5 °C) (01/16/19 1059)  Pulse: 75 (01/16/19 1506)  Resp: 14 (01/16/19 0755)  BP: (!) 185/86 (01/16/19 1506)  SpO2: 95 % (01/16/19 1738) Vital Signs (24h Range):  Temp:  [97.7 °F (36.5 °C)-98.2 °F (36.8 °C)] 97.7 °F (36.5 °C)  Pulse:  [73-80] 75  Resp:  [14-29] 14  SpO2:  [95 %-98 %] 95 %  BP: (131-185)/(69-86) 185/86     Weight: 108.9 kg (240 lb)  Body mass index is 34.44 kg/m².    Intake/Output Summary (Last 24 hours) at 1/16/2019 1758  Last data filed at 1/16/2019 0611  Gross per 24 hour   Intake 480 ml   Output --   Net 480 ml      Physical Exam   Constitutional: She appears well-developed and well-nourished. No distress.   HENT:   Head: Normocephalic and atraumatic.   Neck: Normal range of motion. Neck supple.   Cardiovascular: Normal rate, regular rhythm and normal heart sounds.   No murmur heard.  Pulmonary/Chest: Effort normal and breath sounds normal. No respiratory distress. She has no wheezes.   Abdominal: Soft. Bowel sounds are normal. She exhibits no distension. There is no tenderness.   Musculoskeletal: Normal range of motion. She exhibits no edema, tenderness or deformity.   Skin: Skin is warm and dry. Capillary refill takes less than 2 seconds. No erythema.   Psychiatric: She has a normal mood and affect. Her behavior is normal.       Significant  Labs:   CBC:   Recent Labs   Lab 01/14/19  1928 01/15/19  0434 01/16/19  0559   WBC 11.08 9.56 9.69   HGB 11.7* 11.3* 11.7*   HCT 35.5* 37.1 37.0    121* 137*     CMP:   Recent Labs   Lab 01/15/19  0434 01/16/19  0559 01/16/19  1652    143 141   K 4.0 3.2* 4.2    108 107   CO2 23 25 25   GLU 86 122* 114*   BUN 22* 25* 24*   CREATININE 1.6* 2.1* 1.9*   CALCIUM 8.0* 8.2* 8.6*   PROT 6.8 6.2  --    ALBUMIN 2.6* 2.5*  --    BILITOT 0.5 0.4  --    ALKPHOS 74 72  --    AST 26 12  --    ALT 10 9*  --    ANIONGAP 9 10 9   EGFRNONAA 35.8* 25.7* 29.1*

## 2019-01-16 NOTE — ASSESSMENT & PLAN NOTE
Presented to ED with SBP>200 and c/o HA, blurred vision, chest pain. Started on cardene gtt and sent to ICU. Resumed home BP meds. Amlodipine started. D/c'ed cardene gtt and stepped down to hospital medicine 1/16.  -On Coreg BID, Lisinopril, Chlorthalidone, Amlodipine   -Reports compliance with medications however does not recall which medications she is taking and refers to list. Unclear if actually taking chlorthalidone. States is compliant with low salt diet.  -Improvement with symptoms but noted to still have HA  -Plan for obtain Echo and monitor Cr

## 2019-01-16 NOTE — PLAN OF CARE
01/16/19 1145   Discharge Assessment   Assessment Type Discharge Planning Assessment   Confirmed/corrected address and phone number on facesheet? Yes   Assessment information obtained from? Patient;Medical Record   Communicated expected length of stay with patient/caregiver no   Prior to hospitilization cognitive status: Alert/Oriented;No Deficits   Prior to hospitalization functional status: Independent   Current cognitive status: Alert/Oriented;No Deficits   Current Functional Status: Independent   Lives With other relative(s)  (Patient's nephew lives with her)   Able to Return to Prior Arrangements yes   Is patient able to care for self after discharge? Yes   Who are your caregiver(s) and their phone number(s)? (Miles Quezada (Son) [phone]  Mike Quezada (Daughter) [phone])   Patient's perception of discharge disposition home or selfcare   Readmission Within the Last 30 Days no previous admission in last 30 days   Patient currently being followed by outpatient case management? No   Patient currently receives any other outside agency services? No   Equipment Currently Used at Home other (see comments)  (Has a rolling walker, back brace and tub chair from a prior hospitlization that she isnt currently using)   Do you have any problems affording any of your prescribed medications? No   Is the patient taking medications as prescribed? yes   Does the patient have transportation home? Yes   Transportation Anticipated family or friend will provide   Does the patient receive services at the Coumadin Clinic? No   Discharge Plan A Home with family   Discharge Plan B Home;Home Health   DME Needed Upon Discharge  none   Patient/Family in Agreement with Plan yes   Does the patient have transportation to healthcare appointments? Yes

## 2019-01-16 NOTE — PLAN OF CARE
Problem: Adult Inpatient Plan of Care  Goal: Plan of Care Review  Outcome: Ongoing (interventions implemented as appropriate)   01/16/19 0456   Plan of Care Review   Plan of Care Reviewed With patient   Pt remains free from falls and injuries during shift. Remains in stable condition.  Awake, alert, and oriented x 4. Vital signs stable. Oxygen WNL on room air. Denied pain. C/o not being able to sleep, ramelteon administered. No signs of acute distress noted at this time. Bed in lowest position, wheels locked, call light in reach. Diabetic education completed. Will continue to monitor, safety maintained. IV to right forearm was removed upon arrival to unit due to pain/ warmth, and becoming hard near IV site. Pt stated she complained in the ER but no one removed it.

## 2019-01-16 NOTE — ASSESSMENT & PLAN NOTE
Admission Cr 1.7 which increased to 2.1  Avoid nephrotoxic agents. I/Os  Will follow  Resumed ACEi

## 2019-01-17 VITALS
SYSTOLIC BLOOD PRESSURE: 136 MMHG | TEMPERATURE: 98 F | WEIGHT: 240 LBS | BODY MASS INDEX: 34.36 KG/M2 | DIASTOLIC BLOOD PRESSURE: 76 MMHG | HEIGHT: 70 IN | RESPIRATION RATE: 19 BRPM | HEART RATE: 75 BPM | OXYGEN SATURATION: 96 %

## 2019-01-17 PROBLEM — N17.9 AKI (ACUTE KIDNEY INJURY): Status: ACTIVE | Noted: 2019-01-17

## 2019-01-17 PROBLEM — I16.1 HYPERTENSIVE EMERGENCY: Status: RESOLVED | Noted: 2019-01-14 | Resolved: 2019-01-17

## 2019-01-17 PROBLEM — N17.9 AKI (ACUTE KIDNEY INJURY): Status: RESOLVED | Noted: 2019-01-17 | Resolved: 2019-01-17

## 2019-01-17 PROBLEM — R07.9 CHEST PAIN: Status: RESOLVED | Noted: 2019-01-14 | Resolved: 2019-01-17

## 2019-01-17 LAB
ALBUMIN SERPL BCP-MCNC: 2.7 G/DL
ALP SERPL-CCNC: 79 U/L
ALT SERPL W/O P-5'-P-CCNC: 9 U/L
ANION GAP SERPL CALC-SCNC: 7 MMOL/L
AST SERPL-CCNC: 12 U/L
BASOPHILS # BLD AUTO: 0.07 K/UL
BASOPHILS NFR BLD: 0.6 %
BILIRUB SERPL-MCNC: 0.3 MG/DL
BUN SERPL-MCNC: 24 MG/DL
CALCIUM SERPL-MCNC: 9 MG/DL
CHLORIDE SERPL-SCNC: 108 MMOL/L
CO2 SERPL-SCNC: 26 MMOL/L
CREAT SERPL-MCNC: 1.9 MG/DL
DIFFERENTIAL METHOD: ABNORMAL
EOSINOPHIL # BLD AUTO: 0.2 K/UL
EOSINOPHIL NFR BLD: 2.1 %
ERYTHROCYTE [DISTWIDTH] IN BLOOD BY AUTOMATED COUNT: 15 %
EST. GFR  (AFRICAN AMERICAN): 33.5 ML/MIN/1.73 M^2
EST. GFR  (NON AFRICAN AMERICAN): 29.1 ML/MIN/1.73 M^2
GLUCOSE SERPL-MCNC: 119 MG/DL
HCT VFR BLD AUTO: 41.6 %
HGB BLD-MCNC: 12.8 G/DL
IMM GRANULOCYTES # BLD AUTO: 0.03 K/UL
IMM GRANULOCYTES NFR BLD AUTO: 0.3 %
LYMPHOCYTES # BLD AUTO: 2 K/UL
LYMPHOCYTES NFR BLD: 17.9 %
MAGNESIUM SERPL-MCNC: 1.8 MG/DL
MCH RBC QN AUTO: 27.5 PG
MCHC RBC AUTO-ENTMCNC: 30.8 G/DL
MCV RBC AUTO: 90 FL
MONOCYTES # BLD AUTO: 0.7 K/UL
MONOCYTES NFR BLD: 6.6 %
NEUTROPHILS # BLD AUTO: 7.9 K/UL
NEUTROPHILS NFR BLD: 72.5 %
NRBC BLD-RTO: 0 /100 WBC
PHOSPHATE SERPL-MCNC: 4 MG/DL
PLATELET # BLD AUTO: 143 K/UL
PMV BLD AUTO: 11.4 FL
POCT GLUCOSE: 112 MG/DL (ref 70–110)
POTASSIUM SERPL-SCNC: 4 MMOL/L
PROT SERPL-MCNC: 6.8 G/DL
RBC # BLD AUTO: 4.65 M/UL
SODIUM SERPL-SCNC: 141 MMOL/L
WBC # BLD AUTO: 10.95 K/UL

## 2019-01-17 PROCEDURE — 84100 ASSAY OF PHOSPHORUS: CPT

## 2019-01-17 PROCEDURE — 25000003 PHARM REV CODE 250: Performed by: NURSE PRACTITIONER

## 2019-01-17 PROCEDURE — 36415 COLL VENOUS BLD VENIPUNCTURE: CPT

## 2019-01-17 PROCEDURE — 83735 ASSAY OF MAGNESIUM: CPT

## 2019-01-17 PROCEDURE — 99239 HOSP IP/OBS DSCHRG MGMT >30: CPT | Mod: GC,,, | Performed by: HOSPITALIST

## 2019-01-17 PROCEDURE — 99239 PR HOSPITAL DISCHARGE DAY,>30 MIN: ICD-10-PCS | Mod: GC,,, | Performed by: HOSPITALIST

## 2019-01-17 PROCEDURE — 25000003 PHARM REV CODE 250: Performed by: PHYSICIAN ASSISTANT

## 2019-01-17 PROCEDURE — 25000003 PHARM REV CODE 250: Performed by: STUDENT IN AN ORGANIZED HEALTH CARE EDUCATION/TRAINING PROGRAM

## 2019-01-17 PROCEDURE — 80053 COMPREHEN METABOLIC PANEL: CPT

## 2019-01-17 PROCEDURE — 85025 COMPLETE CBC W/AUTO DIFF WBC: CPT

## 2019-01-17 RX ORDER — AMLODIPINE BESYLATE 10 MG/1
10 TABLET ORAL DAILY
Qty: 30 TABLET | Refills: 11 | Status: SHIPPED | OUTPATIENT
Start: 2019-01-18 | End: 2019-11-08 | Stop reason: SDUPTHER

## 2019-01-17 RX ORDER — GABAPENTIN 100 MG/1
100 CAPSULE ORAL 3 TIMES DAILY
Qty: 90 CAPSULE | Refills: 3 | Status: ON HOLD | OUTPATIENT
Start: 2019-01-17 | End: 2019-05-29 | Stop reason: HOSPADM

## 2019-01-17 RX ORDER — CHLORTHALIDONE 25 MG/1
25 TABLET ORAL DAILY
Qty: 30 TABLET | Refills: 3 | Status: SHIPPED | OUTPATIENT
Start: 2019-01-17 | End: 2019-02-07

## 2019-01-17 RX ADMIN — CHLORTHALIDONE 25 MG: 25 TABLET ORAL at 09:01

## 2019-01-17 RX ADMIN — AMLODIPINE BESYLATE 10 MG: 10 TABLET ORAL at 09:01

## 2019-01-17 RX ADMIN — PANTOPRAZOLE SODIUM 40 MG: 40 TABLET, DELAYED RELEASE ORAL at 09:01

## 2019-01-17 RX ADMIN — LEVOTHYROXINE SODIUM 200 MCG: 0.11 TABLET ORAL at 06:01

## 2019-01-17 RX ADMIN — LISINOPRIL 40 MG: 20 TABLET ORAL at 09:01

## 2019-01-17 RX ADMIN — ESCITALOPRAM OXALATE 10 MG: 5 TABLET, FILM COATED ORAL at 09:01

## 2019-01-17 RX ADMIN — CARVEDILOL 25 MG: 25 TABLET, FILM COATED ORAL at 09:01

## 2019-01-17 NOTE — PLAN OF CARE
[facility]    Discharge Plan of Care    Patient Name: Kathie Quezada  YOB: 1960    PCP: Elan Price MD   [facility] [address]  PCP Phone Number: [phone]  PCP Fax: [phone]    Encounter Date: 01/17/2019    Diagnoses:  Active Hospital Problems    Diagnosis  POA    *Hypertensive emergency [I16.1]  Yes    Chest pain [R07.9]  Yes    CKD stage G3a/A3, GFR 45-59 and albumin creatinine ratio >300 mg/g [N18.3]  Yes    Type 2 diabetes mellitus with circulatory disorder, with long-term current use of insulin [E11.59, Z79.4]  Not Applicable    Postoperative hypothyroidism [E89.0]  Yes    Essential hypertension [I10]  Yes      Resolved Hospital Problems   No resolved problems to display.       Future Appointments   Date Time Provider Department Center   1/18/2019  9:00 AM WHITNEY Lombardo [facility] PC[facility]     Follow-up Information     Elan Price MD.    Specialty:  Hospitalist  Contact information:  [address] [facility] [id number]  [phone]                 Allergies:  Review of patient's allergies indicates:   Allergen Reactions    Bactrim [sulfamethoxazole-trimethoprim] Hives    Codeine Hives    Penicillins Hives    Shellfish containing products Itching    Sulfa (sulfonamide antibiotics) Hives       Diet: diabetic diet: 2200 calorie    Activities: activity as tolerated    Medications: Review discharge medications with patient and family and provide education.      Current Discharge Medication List      START taking these medications    Details   amLODIPine (NORVASC) 10 MG tablet Take 1 tablet (10 mg total) by mouth once daily.  Qty: 30 tablet, Refills: 11      gabapentin (NEURONTIN) 100 MG capsule Take 1 capsule (100 mg total) by mouth 3 (three) times daily.  Qty: 90 capsule, Refills: 3         CONTINUE these medications which have CHANGED    Details   chlorthalidone (HYGROTEN) 25 MG Tab Take 1  "tablet (25 mg total) by mouth once daily.  Qty: 30 tablet, Refills: 3    Associated Diagnoses: CKD stage G3a/A3, GFR 45-59 and albumin creatinine ratio >300 mg/g; Essential hypertension         CONTINUE these medications which have NOT CHANGED    Details   atorvastatin (LIPITOR) 10 MG tablet TK 1 T PO QHS  Refills: 1      blood sugar diagnostic Strp To check BG 1 times daily, to use with insurance preferred meter  Qty: 35 strip, Refills: 11    Associated Diagnoses: Type 2 diabetes mellitus with stage 3 chronic kidney disease, with long-term current use of insulin      carvedilol (COREG) 25 MG tablet Take 25 mg by mouth 2 (two) times daily with meals.       escitalopram oxalate (LEXAPRO) 10 MG tablet Take 10 mg by mouth once daily.      insulin glargine (BASAGLAR KWIKPEN U-100 INSULIN) 100 unit/mL (3 mL) InPn pen Inject 10 Units into the skin once daily.  Qty: 1 Box, Refills: 1      lancets Misc To check BG 1 times daily, to use with insurance preferred meter  Qty: 50 each, Refills: 11    Associated Diagnoses: Type 2 diabetes mellitus with stage 3 chronic kidney disease, with long-term current use of insulin      lisinopril (PRINIVIL,ZESTRIL) 40 MG tablet Take 40 mg by mouth once daily.      pantoprazole (PROTONIX) 40 MG tablet Take 1 tablet (40 mg total) by mouth once daily.  Qty: 90 tablet, Refills: 3      pen needle, diabetic (BD ULTRA-FINE OLGA LIDIA PEN NEEDLE) 32 gauge x 5/32" Ndle To use with Lantus solostar insulin pens  Qty: 100 each, Refills: 3    Associated Diagnoses: Type 2 diabetes mellitus with diabetic peripheral angiopathy without gangrene, with long-term current use of insulin      silver sulfADIAZINE 1% (SILVADENE) 1 % cream Apply 1 application topically 2 (two) times daily.      SYNTHROID 200 mcg tablet Take 1 tablet (200 mcg total) by mouth once daily. BRAND NAME ONLY  Qty: 30 tablet, Refills: 11      traZODone (DESYREL) 50 MG tablet Take 50 mg by mouth daily as needed.  Refills: 0           Anne" MD Dean  Department of Hospital Medicine   [facility]

## 2019-01-17 NOTE — NURSING
Paged IM 5 regarding pt's discharge orders.  Pt was told she was going home at 12:00.  MD said that they are putting discharge orders in computer now.

## 2019-01-17 NOTE — PLAN OF CARE
Problem: Adult Inpatient Plan of Care  Goal: Plan of Care Review  Outcome: Ongoing (interventions implemented as appropriate)   01/17/19 0602   Plan of Care Review   Plan of Care Reviewed With patient   Pt remains free from falls and injuries during shift. Remains in stable condition.  Awake, alert, and oriented x 4. Vital signs stable. Oxygen WNL on room air. Denied pain.  Ramelteon administered per pt request. No signs of acute distress noted at this time. Bed in lowest position, wheels locked, call light in reach. Diabetic education completed. Blood sugar checked, no sliding scale insulin needed. Will continue to monitor, safety maintained.

## 2019-01-17 NOTE — NURSING
Patient refused lovenox injection this shift. Patient is factor VI and can not take anticoagulants. Informed med team.

## 2019-01-17 NOTE — PLAN OF CARE
Problem: Adult Inpatient Plan of Care  Goal: Plan of Care Review  Outcome: Ongoing (interventions implemented as appropriate)  Pt is free from falls trauma and injuries. Bed in low position with call light in reach. Skin is warm and dry. VS are stable. No noted fever or pain. Blood sugars within normal limits not requiring insulin this shift. Patient is alert and independent. Clean catch urine obtained and sent to lab per doctor order.

## 2019-01-17 NOTE — PROGRESS NOTES
[facility] Hospital Medicine  Progress Note    Patient Name: Kathie Quezada  MRN: [id number]  Patient Class: IP- Inpatient   Admission Date: 1/14/2019  Length of Stay: 2 days  Attending Physician: Mahendra Trujillo IV, MD  Primary Care Provider: Elan Price MD    [facility] Medicine Team: [facility] HOSP MED 5 Frank Cohen MD    Subjective:     Principal Problem:Hypertensive emergency    HPI:  Ms Quezada is 58 y.o. female with a history of CVA, IDDM, CKD III, HTN who presents from the Lab work scheduled this morning at the clinic when she was referred to the ED for hypertension of systolic of >200 with headache, CP and blurry vision. At ED patient complained of left sided chest pain that radiated down to her left arm. Patient describes the pain as sharp pain not dependent on movement. Associated symptoms include blurry vision, SOB, nausea, fatigue, headache, back pain, and increased frequency of urination. She had an episode of clear emesis this morning which she did not see any of her daily pills with her emesis. Patient said she is compliant with all of her medication, however going through her home meds she was not taking her chlorthalidone. Denies abdominal pain, hematuria, wounds, rashes, erythema, or sores. Patient describes cold-like symptoms starting 3 days ago which resolved. Patient did not have any sick contact or travel history recently. Patient is compliant with her medications and took all today except her insulin.   At ED, patient received labetalol and nitroglycerin. Patient was started on nicardipine drip and systolic went below 160.         Hospital Course:  Patient was admitted to ICU started on cardene gtt. She was continued on lisinopril and coreg along with Chlorthalidone. CTH was negative for acute intracranial pathology. EKG w/NSR and negative for ischemic changes. Trop negative. Patient's blood pressure and symptoms improved and Cardene gtt was discontinued. She  was stepped down to hospital medicine 1/16.        Interval History: No acute events overnight. Patient with SBPs 150s-160s this am. Denies chest pain or shortness of breath. Does continue to have HA.     Review of Systems   Constitutional: Negative for chills, fatigue and fever.   Respiratory: Negative for cough, choking, chest tightness and shortness of breath.    Cardiovascular: Negative for chest pain, palpitations and leg swelling.   Gastrointestinal: Negative for abdominal distention, abdominal pain, constipation, diarrhea, nausea and vomiting.   Neurological: Positive for headaches. Negative for dizziness, tremors, syncope and weakness.     Objective:     Vital Signs (Most Recent):  Temp: 97.7 °F (36.5 °C) (01/16/19 1059)  Pulse: 75 (01/16/19 1506)  Resp: 14 (01/16/19 0755)  BP: (!) 185/86 (01/16/19 1506)  SpO2: 95 % (01/16/19 1738) Vital Signs (24h Range):  Temp:  [97.7 °F (36.5 °C)-98.2 °F (36.8 °C)] 97.7 °F (36.5 °C)  Pulse:  [73-80] 75  Resp:  [14-29] 14  SpO2:  [95 %-98 %] 95 %  BP: (131-185)/(69-86) 185/86     Weight: 108.9 kg (240 lb)  Body mass index is 34.44 kg/m².    Intake/Output Summary (Last 24 hours) at 1/16/2019 1758  Last data filed at 1/16/2019 0611  Gross per 24 hour   Intake 480 ml   Output --   Net 480 ml      Physical Exam   Constitutional: She appears well-developed and well-nourished. No distress.   HENT:   Head: Normocephalic and atraumatic.   Neck: Normal range of motion. Neck supple.   Cardiovascular: Normal rate, regular rhythm and normal heart sounds.   No murmur heard.  Pulmonary/Chest: Effort normal and breath sounds normal. No respiratory distress. She has no wheezes.   Abdominal: Soft. Bowel sounds are normal. She exhibits no distension. There is no tenderness.   Musculoskeletal: Normal range of motion. She exhibits no edema, tenderness or deformity.   Skin: Skin is warm and dry. Capillary refill takes less than 2 seconds. No erythema.   Psychiatric: She has a normal mood and  affect. Her behavior is normal.       Significant Labs:   CBC:   Recent Labs   Lab 01/14/19  1928 01/15/19  0434 01/16/19  0559   WBC 11.08 9.56 9.69   HGB 11.7* 11.3* 11.7*   HCT 35.5* 37.1 37.0    121* 137*     CMP:   Recent Labs   Lab 01/15/19  0434 01/16/19  0559 01/16/19  1652    143 141   K 4.0 3.2* 4.2    108 107   CO2 23 25 25   GLU 86 122* 114*   BUN 22* 25* 24*   CREATININE 1.6* 2.1* 1.9*   CALCIUM 8.0* 8.2* 8.6*   PROT 6.8 6.2  --    ALBUMIN 2.6* 2.5*  --    BILITOT 0.5 0.4  --    ALKPHOS 74 72  --    AST 26 12  --    ALT 10 9*  --    ANIONGAP 9 10 9   EGFRNONAA 35.8* 25.7* 29.1*     Assessment/Plan:      * Hypertensive emergency    Presented to ED with SBP>200 and c/o HA, blurred vision, chest pain. Started on cardene gtt and sent to ICU. Resumed home BP meds. Amlodipine started. D/c'ed cardene gtt and stepped down to hospital medicine 1/16.  -On Coreg BID, Lisinopril, Chlorthalidone, Amlodipine   -Reports compliance with medications however does not recall which medications she is taking and refers to list. Unclear if actually taking chlorthalidone. States is compliant with low salt diet.  -Improvement with symptoms but noted to still have HA  -Plan for obtain Echo and monitor Cr       Chest pain    Likely secondary to HTN. Trop negative. EKG NSR without ischemic changes       CKD stage G3a/A3, GFR 45-59 and albumin creatinine ratio >300 mg/g    Admission Cr 1.7 which increased to 2.1  Avoid nephrotoxic agents. I/Os  Will follow  Resumed ACEi         Essential hypertension    See hypertensive emergency       Type 2 diabetes mellitus with circulatory disorder, with long-term current use of insulin    Accuchecks and LDSSI     Postoperative hypothyroidism    C/w synthroid         VTE Risk Mitigation (From admission, onward)        Ordered     enoxaparin injection 40 mg  Daily      01/14/19 1902     IP VTE HIGH RISK PATIENT  Once      01/14/19 1902              Frank Cohen,  MD  Department of Hospital Medicine   [facility]

## 2019-01-17 NOTE — PLAN OF CARE
01/17/19 1540   Final Note   Assessment Type Final Discharge Note   Anticipated Discharge Disposition Home   What phone number can be called within the next 1-3 days to see how you are doing after discharge? ([phone])   Hospital Follow Up  Appt(s) scheduled? Yes   Discharge plans and expectations educations in teach back method with documentation complete? Yes   Right Care Referral Info   Post Acute Recommendation No Care

## 2019-01-18 NOTE — DISCHARGE SUMMARY
[facility] Hospital Medicine  Discharge Summary      Patient Name: Kathie Quezada  MRN: [id number]  Admission Date: 1/14/2019  Hospital Length of Stay: 3 days  Discharge Date and Time:  01/17/2019 9:25 PM  Attending Physician: Elvira att. providers found   Discharging Provider: Anne Moran MD  Primary Care Provider: Elan Price MD  [facility] Medicine Team: [facility]OSP MED 5 Anne Moran MD    HPI:   Ms Quezada is 58 y.o. female with a history of CVA, IDDM, CKD III, HTN who presents from the Lab work scheduled this morning at the clinic when she was referred to the ED for hypertension of systolic of >200 with headache, CP and blurry vision. At ED patient complained of left sided chest pain that radiated down to her left arm. Patient describes the pain as sharp pain not dependent on movement. Associated symptoms include blurry vision, SOB, nausea, fatigue, headache, back pain, and increased frequency of urination. She had an episode of clear emesis this morning which she did not see any of her daily pills with her emesis. Patient said she is compliant with all of her medication, however going through her home meds she was not taking her chlorthalidone. Denies abdominal pain, hematuria, wounds, rashes, erythema, or sores. Patient describes cold-like symptoms starting 3 days ago which resolved. Patient did not have any sick contact or travel history recently. Patient is compliant with her medications and took all today except her insulin.   At ED, patient received labetalol and nitroglycerin. Patient was started on nicardipine drip and systolic went below 160.       Hospital Course:   Patient was admitted to ICU started on cardene gtt. She was continued on lisinopril and coreg along with Chlorthalidone. CTH was negative for acute intracranial pathology. EKG w/NSR and negative for ischemic changes. Trop negative. Patient's blood pressure and symptoms improved and Cardene gtt was discontinued. She  was stepped down to hospital medicine 1/16.    Interval History: No acute events overnight. Patient with SBPs 150s-160s this am. Denies chest pain or shortness of breath. Does continue to have HA.      Review of Systems   Constitutional: Negative for chills, fatigue and fever.   Respiratory: Negative for cough, choking, chest tightness and shortness of breath.    Cardiovascular: Negative for chest pain, palpitations and leg swelling.   Gastrointestinal: Negative for abdominal distention, abdominal pain, constipation, diarrhea, nausea and vomiting.   Neurological: Positive for headaches. Negative for dizziness, tremors, syncope and weakness.      Objective:      Vital Signs (Most Recent):  Temp: 97.7 °F (36.5 °C) (01/16/19 1059)  Pulse: 75 (01/16/19 1506)  Resp: 14 (01/16/19 0755)  BP: (!) 185/86 (01/16/19 1506)  SpO2: 95 % (01/16/19 1738) Vital Signs (24h Range):  Temp:  [97.7 °F (36.5 °C)-98.2 °F (36.8 °C)] 97.7 °F (36.5 °C)  Pulse:  [73-80] 75  Resp:  [14-29] 14  SpO2:  [95 %-98 %] 95 %  BP: (131-185)/(69-86) 185/86      Weight: 108.9 kg (240 lb)  Body mass index is 34.44 kg/m².     Intake/Output Summary (Last 24 hours) at 1/16/2019 1758  Last data filed at 1/16/2019 0611      Gross per 24 hour   Intake 480 ml   Output --   Net 480 ml      Physical Exam   Constitutional: She appears well-developed and well-nourished. No distress.   HENT:   Head: Normocephalic and atraumatic.   Neck: Normal range of motion. Neck supple.   Cardiovascular: Normal rate, regular rhythm and normal heart sounds.   No murmur heard.  Pulmonary/Chest: Effort normal and breath sounds normal. No respiratory distress. She has no wheezes.   Abdominal: Soft. Bowel sounds are normal. She exhibits no distension. There is no tenderness.   Musculoskeletal: Normal range of motion. She exhibits no edema, tenderness or deformity.   Skin: Skin is warm and dry. Capillary refill takes less than 2 seconds. No erythema.   Psychiatric: She has a normal mood  and affect. Her behavior is normal.     Consults:   Consults (From admission, onward)        Status Ordering Provider     Inpatient consult to Critical Care Medicine  Once     Provider:  (Not yet assigned)    OSWALDO Bartholomew        Final Active Diagnoses:    Diagnosis Date Noted POA    CKD stage G3a/A3, GFR 45-59 and albumin creatinine ratio >300 mg/g [N18.3] 05/16/2018 Yes    Type 2 diabetes mellitus with circulatory disorder, with long-term current use of insulin [E11.59, Z79.4] 04/09/2018 Not Applicable    Postoperative hypothyroidism [E89.0] 04/09/2018 Yes    Essential hypertension [I10] 04/09/2018 Yes      Problems Resolved During this Admission:    Diagnosis Date Noted Date Resolved POA    PRINCIPAL PROBLEM:  Hypertensive emergency [I16.1] 01/14/2019 01/17/2019 Yes    ARUNA (acute kidney injury) [N17.9] 01/17/2019 01/17/2019 Yes    Chest pain [R07.9] 01/14/2019 01/17/2019 Yes       Discharged Condition: fair    Disposition: Home or Self Care    Follow Up:  Follow-up Information     Elan Price MD.    Specialty:  Hospitalist  Why:  appt scheduled for 1/31/19 @ [facility] for hospital f/u  Contact information:  [address] [place] [address] [id number] [phone]                 Patient Instructions:      Diet diabetic     Notify your health care provider if you experience any of the following:  temperature >100.4     Notify your health care provider if you experience any of the following:  persistent nausea and vomiting or diarrhea     Notify your health care provider if you experience any of the following:  severe uncontrolled pain     Notify your health care provider if you experience any of the following:  redness, tenderness, or signs of infection (pain, swelling, redness, odor or green/yellow discharge around incision site)     Notify your health care provider if you experience any of the following:  difficulty breathing or increased cough     Notify your health care provider if you  experience any of the following:  severe persistent headache     Notify your health care provider if you experience any of the following:  worsening rash     Notify your health care provider if you experience any of the following:  persistent dizziness, light-headedness, or visual disturbances     Notify your health care provider if you experience any of the following:  increased confusion or weakness     Activity as tolerated       Significant Diagnostic Studies: Labs:   CMP   Recent Labs   Lab 01/16/19  0559 01/16/19  1652 01/17/19  0633    141 141   K 3.2* 4.2 4.0    107 108   CO2 25 25 26   * 114* 119*   BUN 25* 24* 24*   CREATININE 2.1* 1.9* 1.9*   CALCIUM 8.2* 8.6* 9.0   PROT 6.2  --  6.8   ALBUMIN 2.5*  --  2.7*   BILITOT 0.4  --  0.3   ALKPHOS 72  --  79   AST 12  --  12   ALT 9*  --  9*   ANIONGAP 10 9 7*   ESTGFRAFRICA 29.7* 33.5* 33.5*   EGFRNONAA 25.7* 29.1* 29.1*   , CBC   Recent Labs   Lab 01/16/19  0559 01/17/19  0827   WBC 9.69 10.95   HGB 11.7* 12.8   HCT 37.0 41.6   * 143*   , INR   Lab Results   Component Value Date    INR 1.1 01/14/2019    INR 1.0 01/14/2019    INR 1.1 10/18/2018   , Lipid Panel   Lab Results   Component Value Date    CHOL 213 (H) 10/18/2018    HDL 76 (H) 10/18/2018    LDLCALC 115.2 10/18/2018    TRIG 109 10/18/2018    CHOLHDL 35.7 10/18/2018   , Troponin   Recent Labs   Lab 01/14/19  1502   TROPONINI <0.006    and A1C:   Recent Labs   Lab 10/18/18  1248 01/14/19  1241   HGBA1C 5.0 5.0      TTE (01/16/19)  Left Ventricle Normal ejection fraction at 60%. Normal left ventricular cavity size. Concentric observed. Indeterminate left ventricular diastolic function. No wall motion abnormalities.   Right Ventricle Normal cavity size, wall thickness and ejection fraction. Wall motion normal.   Left Atrium Severe atrial enlargement.   Right Atrium Normal atrial size.   Aortic Valve The valve is trileaflet. Presence of mild aortic annular calcification  No  stenosis. No regurgitation. Mobility is normal   Mitral Valve Normal valve structure. No stenosis. Mild regurgitation. Normal leaflet mobility.   Tricuspid Valve The tricuspid valve is normal. No stenosis. Trace regurgitation. Mobility is normal. The estimated PA systolic pressure is 33 mmHg.   Pulmonic Valve No stenosis. Mild regurgitation.   IVC/SVC Normal central venous pressure (3 mm Hg).   Ascending Aorta The aortic root and ascending aorta are normal in size.   Pericardium No pericardial effusion. Pericardium is normal.     Assessment and plan:      * Hypertensive emergency     Presented to ED with SBP>200 and c/o HA, blurred vision, chest pain. Started on cardene gtt and sent to ICU. Resumed home BP meds. Amlodipine started. D/c'ed cardene gtt and stepped down to hospital medicine 1/16.  -On Coreg BID, Lisinopril, Chlorthalidone, Amlodipine   -Reports compliance with medications however does not recall which medications she is taking and refers to list. Unclear if actually taking chlorthalidone. States is compliant with low salt diet.  -Improvement with symptoms but noted to still have HA  -CT head (01/14/19) negative for intracranial abnormalities    Plan:  Transitioned to oral Norvasc, chlorthalidone, lisinopril, and coreg 25 mg BID with BP range 130-145/75-90 mmHg   -- will continue these medications on outpatient basis  -- [facility] for follow up blood pressure checks  - Echo EF 60% no valvular, wall motion abnormalities noted             CKD stage G3a/A3, GFR 45-59 and albumin creatinine ratio >300 mg/g     Admission Cr 1.7 which increased to 2.1  Avoid nephrotoxic agents. I/Os  Creatinine on discharge 1.9  Resumed ACEi      Essential hypertension     See hypertensive emergency      Type 2 diabetes mellitus with circulatory disorder, with long-term current use of insulin     Accuchecks and LDSSI      Postoperative hypothyroidism     C/w synthroid  "    Medications:  Reconciled Home Medications:      Medication List      START taking these medications    amLODIPine 10 MG tablet  Commonly known as:  NORVASC  Take 1 tablet (10 mg total) by mouth once daily.  Start taking on:  1/18/2019     gabapentin 100 MG capsule  Commonly known as:  NEURONTIN  Take 1 capsule (100 mg total) by mouth 3 (three) times daily.        CONTINUE taking these medications    atorvastatin 10 MG tablet  Commonly known as:  LIPITOR  TK 1 T PO QHS     blood sugar diagnostic Strp  To check BG 1 times daily, to use with insurance preferred meter     carvedilol 25 MG tablet  Commonly known as:  COREG  Take 25 mg by mouth 2 (two) times daily with meals.     chlorthalidone 25 MG Tab  Commonly known as:  HYGROTEN  Take 1 tablet (25 mg total) by mouth once daily.     escitalopram oxalate 10 MG tablet  Commonly known as:  LEXAPRO  Take 10 mg by mouth once daily.     insulin glargine 100 units/mL (3mL) SubQ pen  Commonly known as:  BASAGLAR KWIKPEN U-100 INSULIN  Inject 10 Units into the skin once daily.     lancets Misc  To check BG 1 times daily, to use with insurance preferred meter     lisinopril 40 MG tablet  Commonly known as:  PRINIVIL,ZESTRIL  Take 40 mg by mouth once daily.     pantoprazole 40 MG tablet  Commonly known as:  PROTONIX  Take 1 tablet (40 mg total) by mouth once daily.     pen needle, diabetic 32 gauge x 5/32" Ndle  Commonly known as:  BD ULTRA-FINE OLGA LIDIA PEN NEEDLE  To use with Lantus solostar insulin pens     silver sulfADIAZINE 1% 1 % cream  Commonly known as:  SILVADENE  Apply 1 application topically 2 (two) times daily.     SYNTHROID 200 MCG tablet  Generic drug:  levothyroxine  Take 1 tablet (200 mcg total) by mouth once daily. BRAND NAME ONLY     traZODone 50 MG tablet  Commonly known as:  DESYREL  Take 50 mg by mouth daily as needed.          Follow-up Information      Elan Price MD.    Specialty:  Hospitalist  Contact information:  [address] [place] [address]  [phone]        Indwelling Lines/Drains at time of discharge:   Lines/Drains/Airways          None        Time spent on the discharge of patient: 45 minutes  Patient was seen and examined on the date of discharge and determined to be suitable for discharge.    Anne Moran MD   Internal Medicine PGY II  Department of Hospital Medicine  [facility]

## 2019-01-18 NOTE — SUBJECTIVE & OBJECTIVE
Interval History (01/17/19): Patient seen and examined at the bedside. No acute events overnight. Tolerating oral diet. Regular bowel movements and ambulating fine. Denies chest pain, dyspnea, leg swelling. Palpations.     Transthoracic Echo with EF 60% no valvular or wall motion abnormalities.     Review of Systems   Constitutional: Negative for chills, fatigue, fever and unexpected weight change.   HENT: Negative for facial swelling, hearing loss and postnasal drip.    Eyes: Negative for visual disturbance.   Respiratory: Negative for cough, choking, chest tightness and shortness of breath.    Cardiovascular: Negative for chest pain, palpitations and leg swelling.   Gastrointestinal: Negative for abdominal pain, constipation, diarrhea, nausea and vomiting.   Musculoskeletal: Negative for arthralgias and joint swelling.   Neurological: Negative for tremors, syncope, weakness, light-headedness, numbness and headaches.   Hematological: Negative for adenopathy.     Objective:     Vital Signs (Most Recent):  Temp: 98 °F (36.7 °C) (01/17/19 0407)  Pulse: 75 (01/17/19 1502)  Resp: 19 (01/17/19 1144)  BP: 136/76 (01/17/19 1144)  SpO2: 96 % (01/17/19 1144) Vital Signs (24h Range):  Temp:  [98 °F (36.7 °C)] 98 °F (36.7 °C)  Pulse:  [70-76] 75  Resp:  [18-24] 19  SpO2:  [96 %-98 %] 96 %  BP: (136-141)/(76-79) 136/76     Weight: 108.9 kg (240 lb)  Body mass index is 34.44 kg/m².    Intake/Output Summary (Last 24 hours) at 1/17/2019 2120  Last data filed at 1/17/2019 0620  Gross per 24 hour   Intake 240 ml   Output --   Net 240 ml      Physical Exam   Constitutional: She is oriented to person, place, and time. She appears well-developed and well-nourished. No distress.   HENT:   Mouth/Throat: Oropharynx is clear and moist.   Eyes: EOM are normal. Pupils are equal, round, and reactive to light. No scleral icterus.   Neck: Normal range of motion. Neck supple. No JVD present.   Cardiovascular: Normal rate, regular rhythm, normal  heart sounds and intact distal pulses. Exam reveals no gallop and no friction rub.   No murmur heard.  Pulmonary/Chest: Effort normal and breath sounds normal. No respiratory distress. She has no wheezes. She has no rales.   Abdominal: Soft. Bowel sounds are normal. She exhibits no distension and no mass. There is no tenderness.   Musculoskeletal: Normal range of motion. She exhibits no edema, tenderness or deformity.   Lymphadenopathy:     She has no cervical adenopathy.   Neurological: She is alert and oriented to person, place, and time. She displays normal reflexes. No cranial nerve deficit. She exhibits normal muscle tone. Coordination normal.   Skin: Skin is warm and dry. Capillary refill takes less than 2 seconds. She is not diaphoretic.       Significant Labs:   CBC:   Recent Labs   Lab 01/16/19  0559 01/17/19  0827   WBC 9.69 10.95   HGB 11.7* 12.8   HCT 37.0 41.6   * 143*     CMP:   Recent Labs   Lab 01/16/19  0559 01/16/19  1652 01/17/19  0633    141 141   K 3.2* 4.2 4.0    107 108   CO2 25 25 26   * 114* 119*   BUN 25* 24* 24*   CREATININE 2.1* 1.9* 1.9*   CALCIUM 8.2* 8.6* 9.0   PROT 6.2  --  6.8   ALBUMIN 2.5*  --  2.7*   BILITOT 0.4  --  0.3   ALKPHOS 72  --  79   AST 12  --  12   ALT 9*  --  9*   ANIONGAP 10 9 7*   EGFRNONAA 25.7* 29.1* 29.1*       Significant Imaging: I have reviewed all pertinent imaging results/findings within the past 24 hours.

## 2019-01-22 ENCOUNTER — TELEPHONE (OUTPATIENT)
Dept: INTERNAL MEDICINE | Facility: CLINIC | Age: 59
End: 2019-01-22

## 2019-01-22 NOTE — TELEPHONE ENCOUNTER
----- Message from WHITNEY Lombardo sent at 1/22/2019  6:50 AM CST -----  Change this patient to a Hospital Follow up slot..just discharged from Hospital on 1/18.   Needs an hour long clinic visit.     Thanks

## 2019-01-22 NOTE — TELEPHONE ENCOUNTER
Spoke with pt and advised that appt date was changed to be able to assist pt in hospital follow up

## 2019-01-29 NOTE — PHYSICIAN QUERY
"PT Name: Kathie Quezada  MR #: [id number]    Physician Query Form - Heart  Condition Clarification     CDS/: Иван Cardenas Jr, RN               Contact information:[email or web address]  This form is a permanent document in the medical record.     Query Date: January 29, 2019    By submitting this query, we are merely seeking further clarification of documentation. Please utilize your independent clinical judgment when addressing the question(s) below.    The medical record contains the following   Indicators     Supporting Clinical Findings Location in Medical Record   x  1/14 Labs    EF     x Radiology findings CLINICAL HISTORY:  Shortness of breath    Impression    No acute abnormality.   1/14 Chest X Ray Report      1/14 Chest X Ray Report   x Echo Results Left Ventricle Normal ejection fraction at 60%. Normal left ventricular cavity size.   Concentric observed. Indeterminate left ventricular diastolic function. No wall motion abnormalities.   1/16 Transthoracic Echo    "Ascites" documented     x "SOB" or "IBRAHIM" documented Respiratory: Positive for shortness of breath.      Negative for apnea, choking, chest tightness and shortness of breath.     1/14 ED Provider Note    1/14 ED Provider Note    "Hypoxia" documented     x Heart Failure documented she's had this smothering feeling the last several days and has had to use more pillows than usual. she reports a hx of CHF    58 y.o. female with a history of CVA, medical issues including DM, CKD III, HTN (Lisinopril and Coreg), and CHF     CHF (congestive heart failure) 1/14 ED Provider Note        1/14 H&P        1/14 H&P   (Past Medical History)   x "Edema" documented Musculoskeletal: Normal range of motion. She exhibits no edema, tenderness or deformity   1/14 H&P    Diuretics/Meds      Treatment:      Other:      Heart failure (HF) can be acute, chronic or both. It is generally further specificed as systolic, diastolic, or combined. " Lastly, it is important to identify an underlying etiology if known or suspected.     Common clues to acute exacerbation:  Rapidly progressive symptoms (w/in 2 weeks of presentation), using IV diuretics to treat, using supplemental O2, pulmonary edema on Xray, MI w/in 4 weeks, and/or BNP >500    Systolic Heart Failure: is defined as chart documentation of a left ventricular ejection fraction (LVEF) less than 40%     Diastolic Heart Failure: is defined as a left ventricular ejection fraction (LVEF) greater than 40%   +      Evidence of diastolic dysfunction on echocardiography OR    Right heart catheterization wedge pressure above 12 mm Hg OR    Left heart catheterization left ventricular end diastolic pressure 18 mm Hg or above.    References: *American Heart Association    The clinical guidelines noted below are only system guidelines, and do not replace the providers clinical judgment.     Provider, please specify the diagnosis associated with above clinical findings  Further Specify the Congestive  Heart Failure    [   ] Chronic Diastolic Heart Failure - Pre-existing diastolic HF diagnosis.  EF > 40%  without  acute HF symptoms documented  [   ] Other (please specify): ___________________________________  [ x ] Clinically Undetermined                          Please document in your progress notes daily for the duration of treatment until resolved and include in your discharge summary.

## 2019-01-31 ENCOUNTER — OFFICE VISIT (OUTPATIENT)
Dept: PRIMARY CARE CLINIC | Facility: CLINIC | Age: 59
End: 2019-01-31
Payer: MEDICARE

## 2019-01-31 ENCOUNTER — TELEPHONE (OUTPATIENT)
Dept: INTERNAL MEDICINE | Facility: CLINIC | Age: 59
End: 2019-01-31

## 2019-01-31 ENCOUNTER — LAB VISIT (OUTPATIENT)
Dept: LAB | Facility: HOSPITAL | Age: 59
End: 2019-01-31
Payer: MEDICARE

## 2019-01-31 ENCOUNTER — IMMUNIZATION (OUTPATIENT)
Dept: INTERNAL MEDICINE | Facility: CLINIC | Age: 59
End: 2019-01-31
Payer: MEDICARE

## 2019-01-31 VITALS
DIASTOLIC BLOOD PRESSURE: 90 MMHG | HEART RATE: 71 BPM | OXYGEN SATURATION: 98 % | WEIGHT: 253.5 LBS | BODY MASS INDEX: 36.29 KG/M2 | HEIGHT: 70 IN | SYSTOLIC BLOOD PRESSURE: 134 MMHG

## 2019-01-31 DIAGNOSIS — E78.2 MIXED HYPERLIPIDEMIA: ICD-10-CM

## 2019-01-31 DIAGNOSIS — R82.90 ABNORMAL FINDING IN URINE: ICD-10-CM

## 2019-01-31 DIAGNOSIS — I10 ESSENTIAL HYPERTENSION: ICD-10-CM

## 2019-01-31 DIAGNOSIS — Z79.4 TYPE 2 DIABETES MELLITUS WITH OTHER CIRCULATORY COMPLICATION, WITH LONG-TERM CURRENT USE OF INSULIN: ICD-10-CM

## 2019-01-31 DIAGNOSIS — E11.59 TYPE 2 DIABETES MELLITUS WITH OTHER CIRCULATORY COMPLICATION, WITH LONG-TERM CURRENT USE OF INSULIN: ICD-10-CM

## 2019-01-31 DIAGNOSIS — Z09 HOSPITAL DISCHARGE FOLLOW-UP: ICD-10-CM

## 2019-01-31 DIAGNOSIS — N17.9 AKI (ACUTE KIDNEY INJURY): Primary | ICD-10-CM

## 2019-01-31 DIAGNOSIS — N18.31 CKD STAGE G3A/A3, GFR 45-59 AND ALBUMIN CREATININE RATIO >300 MG/G: ICD-10-CM

## 2019-01-31 DIAGNOSIS — I10 ESSENTIAL HYPERTENSION: Primary | ICD-10-CM

## 2019-01-31 LAB
ANION GAP SERPL CALC-SCNC: 8 MMOL/L
BACTERIA #/AREA URNS AUTO: NORMAL /HPF
BILIRUB UR QL STRIP: NEGATIVE
BUN SERPL-MCNC: 49 MG/DL
CALCIUM SERPL-MCNC: 9.2 MG/DL
CHLORIDE SERPL-SCNC: 111 MMOL/L
CLARITY UR REFRACT.AUTO: CLEAR
CO2 SERPL-SCNC: 22 MMOL/L
COLOR UR AUTO: ABNORMAL
CREAT SERPL-MCNC: 2.3 MG/DL
EST. GFR  (AFRICAN AMERICAN): 27 ML/MIN/1.73 M^2
EST. GFR  (NON AFRICAN AMERICAN): 23 ML/MIN/1.73 M^2
ESTIMATED AVG GLUCOSE: 108 MG/DL
GLUCOSE SERPL-MCNC: 85 MG/DL
GLUCOSE UR QL STRIP: NEGATIVE
HBA1C MFR BLD HPLC: 5.4 %
HGB UR QL STRIP: ABNORMAL
HYALINE CASTS UR QL AUTO: 0 /LPF
KETONES UR QL STRIP: NEGATIVE
LEUKOCYTE ESTERASE UR QL STRIP: NEGATIVE
MICROSCOPIC COMMENT: NORMAL
NITRITE UR QL STRIP: NEGATIVE
PH UR STRIP: 6 [PH] (ref 5–8)
POTASSIUM SERPL-SCNC: 4.6 MMOL/L
PROT UR QL STRIP: ABNORMAL
RBC #/AREA URNS AUTO: 0 /HPF (ref 0–4)
SODIUM SERPL-SCNC: 141 MMOL/L
SP GR UR STRIP: 1.01 (ref 1–1.03)
SQUAMOUS #/AREA URNS AUTO: 0 /HPF
URN SPEC COLLECT METH UR: ABNORMAL
WBC #/AREA URNS AUTO: 0 /HPF (ref 0–5)

## 2019-01-31 PROCEDURE — 99214 OFFICE O/P EST MOD 30 MIN: CPT | Mod: S$PBB,,, | Performed by: NURSE PRACTITIONER

## 2019-01-31 PROCEDURE — 36415 COLL VENOUS BLD VENIPUNCTURE: CPT

## 2019-01-31 PROCEDURE — 80048 BASIC METABOLIC PNL TOTAL CA: CPT

## 2019-01-31 PROCEDURE — 90686 IIV4 VACC NO PRSV 0.5 ML IM: CPT | Mod: PBBFAC

## 2019-01-31 PROCEDURE — 83036 HEMOGLOBIN GLYCOSYLATED A1C: CPT

## 2019-01-31 PROCEDURE — 99215 OFFICE O/P EST HI 40 MIN: CPT | Mod: PBBFAC | Performed by: NURSE PRACTITIONER

## 2019-01-31 PROCEDURE — 99214 PR OFFICE/OUTPT VISIT, EST, LEVL IV, 30-39 MIN: ICD-10-PCS | Mod: S$PBB,,, | Performed by: NURSE PRACTITIONER

## 2019-01-31 PROCEDURE — 99999 PR PBB SHADOW E&M-EST. PATIENT-LVL V: CPT | Mod: PBBFAC,,, | Performed by: NURSE PRACTITIONER

## 2019-01-31 PROCEDURE — 81001 URINALYSIS AUTO W/SCOPE: CPT

## 2019-01-31 PROCEDURE — 87086 URINE CULTURE/COLONY COUNT: CPT

## 2019-01-31 PROCEDURE — 99999 PR PBB SHADOW E&M-EST. PATIENT-LVL V: ICD-10-PCS | Mod: PBBFAC,,, | Performed by: NURSE PRACTITIONER

## 2019-01-31 RX ORDER — HYDRALAZINE HYDROCHLORIDE 25 MG/1
TABLET, FILM COATED ORAL
Qty: 30 TABLET | Refills: 0 | Status: ON HOLD | OUTPATIENT
Start: 2019-01-31 | End: 2019-05-30 | Stop reason: HOSPADM

## 2019-01-31 NOTE — TELEPHONE ENCOUNTER
"  BMP  Lab Results   Component Value Date     01/31/2019    K 4.6 01/31/2019     (H) 01/31/2019    CO2 22 (L) 01/31/2019    BUN 49 (H) 01/31/2019    CREATININE 2.3 (H) (<< 1.9 at discharge) 01/31/2019    CALCIUM 9.2 01/31/2019    ANIONGAP 8 01/31/2019    ESTGFRAFRICA 27 (A) 01/31/2019    EGFRNONAA 23 (A) 01/31/2019       ARUNA in the setting of CKD III:   Hold the Hygroten and decrease dose of Lisinopril for 3 days, and repeat labs on Monday; checking UA and culture, based on c/o "frequency".   Of note, difficult to leave off ACE and Hygroten completely, during this time, given recent admission for Hypertensive emergency with SBP > 200 and warranting admission to MICU on Cardene gtt.   Advised once again to monitor her BP and keep me updated if she should accelerate above 160.       Given a PRN prescription for Hydralazine to take during this time with explicit instructions to take 25 mg PRN SBP > 160 mmhg, and to only take as needed.   *She has left sided dysfunction, with reserve of 60 % and PAP of 33 on TTE from 1/2019.   *Max'd on Coreg and Norvasc  Unable to fluid challenge in the OP setting.     Will repeat labs in on Monday, 2/4/2019, if still abnormal, will need to be readmitted.     Of note, clinically, complaining of "feeling sleep". Will check UA, however, monitor closely given the rise in her renal markers again.   Needs to follow up with Nephrology (apt noted).       SDJ  "

## 2019-01-31 NOTE — TELEPHONE ENCOUNTER
----- Message from WHITNEY Lombardo sent at 1/31/2019  2:33 PM CST -----  Please schedule her an apt with Nephrology. (referral placed)    Also, schedule her for labs on Monday, 2/4.     Thanks!

## 2019-02-01 ENCOUNTER — PATIENT MESSAGE (OUTPATIENT)
Dept: INTERNAL MEDICINE | Facility: CLINIC | Age: 59
End: 2019-02-01

## 2019-02-01 ENCOUNTER — TELEPHONE (OUTPATIENT)
Dept: INTERNAL MEDICINE | Facility: CLINIC | Age: 59
End: 2019-02-01

## 2019-02-01 DIAGNOSIS — N17.9 AKI (ACUTE KIDNEY INJURY): ICD-10-CM

## 2019-02-01 LAB — BACTERIA UR CULT: NORMAL

## 2019-02-01 RX ORDER — HYDRALAZINE HYDROCHLORIDE 25 MG/1
TABLET, FILM COATED ORAL
Qty: 30 TABLET | Refills: 0 | OUTPATIENT
Start: 2019-02-01

## 2019-02-01 NOTE — TELEPHONE ENCOUNTER
A1c: 5.4 % (<< 5% three months ago)    No change in plan at this time.   Continued close monitoring of BS as explained to the patient.    HEAVENLY

## 2019-02-01 NOTE — TELEPHONE ENCOUNTER
Patient called to report that Rx not sent in .called and confirmed with Rosa, that it is ready for .     HEAVENLY

## 2019-02-04 ENCOUNTER — PATIENT MESSAGE (OUTPATIENT)
Dept: INTERNAL MEDICINE | Facility: CLINIC | Age: 59
End: 2019-02-04

## 2019-02-04 ENCOUNTER — LAB VISIT (OUTPATIENT)
Dept: LAB | Facility: HOSPITAL | Age: 59
End: 2019-02-04
Attending: INTERNAL MEDICINE
Payer: MEDICARE

## 2019-02-04 DIAGNOSIS — N17.9 AKI (ACUTE KIDNEY INJURY): ICD-10-CM

## 2019-02-04 LAB
ANION GAP SERPL CALC-SCNC: 10 MMOL/L
BUN SERPL-MCNC: 56 MG/DL
CALCIUM SERPL-MCNC: 8.9 MG/DL
CHLORIDE SERPL-SCNC: 110 MMOL/L
CO2 SERPL-SCNC: 21 MMOL/L
CREAT SERPL-MCNC: 2.5 MG/DL
EST. GFR  (AFRICAN AMERICAN): 24 ML/MIN/1.73 M^2
EST. GFR  (NON AFRICAN AMERICAN): 21 ML/MIN/1.73 M^2
GLUCOSE SERPL-MCNC: 120 MG/DL
POTASSIUM SERPL-SCNC: 4.5 MMOL/L
SODIUM SERPL-SCNC: 141 MMOL/L

## 2019-02-04 PROCEDURE — 36415 COLL VENOUS BLD VENIPUNCTURE: CPT

## 2019-02-04 PROCEDURE — 80048 BASIC METABOLIC PNL TOTAL CA: CPT

## 2019-02-05 ENCOUNTER — TELEPHONE (OUTPATIENT)
Dept: INTERNAL MEDICINE | Facility: CLINIC | Age: 59
End: 2019-02-05

## 2019-02-05 NOTE — TELEPHONE ENCOUNTER
CMP  Sodium   Date Value Ref Range Status   02/04/2019 141 136 - 145 mmol/L Final     Potassium   Date Value Ref Range Status   02/04/2019 4.5 3.5 - 5.1 mmol/L Final     Chloride   Date Value Ref Range Status   02/04/2019 110 95 - 110 mmol/L Final     CO2   Date Value Ref Range Status   02/04/2019 21 (L) 23 - 29 mmol/L Final     Glucose   Date Value Ref Range Status   02/04/2019 120 (H) 70 - 110 mg/dL Final     BUN, Bld   Date Value Ref Range Status   02/04/2019 56 (H) 6 - 20 mg/dL Final     Creatinine   Date Value Ref Range Status   02/04/2019 2.5 (H) 0.5 - 1.4 mg/dL Final     Calcium   Date Value Ref Range Status   02/04/2019 8.9 8.7 - 10.5 mg/dL Final     Total Protein   Date Value Ref Range Status   01/17/2019 6.8 6.0 - 8.4 g/dL Final     Albumin   Date Value Ref Range Status   01/17/2019 2.7 (L) 3.5 - 5.2 g/dL Final     Total Bilirubin   Date Value Ref Range Status   01/17/2019 0.3 0.1 - 1.0 mg/dL Final     Comment:     For infants and newborns, interpretation of results should be based  on gestational age, weight and in agreement with clinical  observations.  Premature Infant recommended reference ranges:  Up to 24 hours.............<8.0 mg/dL  Up to 48 hours............<12.0 mg/dL  3-5 days..................<15.0 mg/dL  6-29 days.................<15.0 mg/dL       Alkaline Phosphatase   Date Value Ref Range Status   01/17/2019 79 55 - 135 U/L Final     AST   Date Value Ref Range Status   01/17/2019 12 10 - 40 U/L Final     ALT   Date Value Ref Range Status   01/17/2019 9 (L) 10 - 44 U/L Final     Anion Gap   Date Value Ref Range Status   02/04/2019 10 8 - 16 mmol/L Final     eGFR if    Date Value Ref Range Status   02/04/2019 24 (A) >60 mL/min/1.73 m^2 Final     eGFR if non    Date Value Ref Range Status   02/04/2019 21 (A) >60 mL/min/1.73 m^2 Final     Comment:     Calculation used to obtain the estimated glomerular filtration  rate (eGFR) is the CKD-EPI equation.        Spoke  with Dr. Ramires's clinic this morning.  Patient's renal status has not improved despite the changes in medications, as noted, on repeat labs yesterday (serum creat 1.9>>2.3>>2.5).   THOMAS Parnell clinic supervisor is currently working on an apt to be seen in Nephrology clinic. (Last seen in 9/2019).     Results and plans have been shared with the patient this morning.     HEAVENLY

## 2019-02-07 ENCOUNTER — OFFICE VISIT (OUTPATIENT)
Dept: NEPHROLOGY | Facility: CLINIC | Age: 59
DRG: 291 | End: 2019-02-07
Payer: MEDICARE

## 2019-02-07 ENCOUNTER — HOSPITAL ENCOUNTER (INPATIENT)
Facility: HOSPITAL | Age: 59
LOS: 1 days | Discharge: HOME | DRG: 291 | End: 2019-02-08
Attending: EMERGENCY MEDICINE | Admitting: HOSPITALIST
Payer: MEDICARE

## 2019-02-07 VITALS
HEIGHT: 70 IN | OXYGEN SATURATION: 99 % | DIASTOLIC BLOOD PRESSURE: 80 MMHG | BODY MASS INDEX: 36.58 KG/M2 | WEIGHT: 255.5 LBS | HEART RATE: 72 BPM | SYSTOLIC BLOOD PRESSURE: 140 MMHG

## 2019-02-07 DIAGNOSIS — Z86.19 HISTORY OF HEPATITIS C: ICD-10-CM

## 2019-02-07 DIAGNOSIS — E87.70 HYPERVOLEMIA, UNSPECIFIED HYPERVOLEMIA TYPE: ICD-10-CM

## 2019-02-07 DIAGNOSIS — I50.9 ACUTE ON CHRONIC CONGESTIVE HEART FAILURE, UNSPECIFIED HEART FAILURE TYPE: ICD-10-CM

## 2019-02-07 DIAGNOSIS — N17.9 AKI (ACUTE KIDNEY INJURY): Primary | ICD-10-CM

## 2019-02-07 DIAGNOSIS — I50.9 CHF EXACERBATION: ICD-10-CM

## 2019-02-07 DIAGNOSIS — N18.31 CKD STAGE G3A/A3, GFR 45-59 AND ALBUMIN CREATININE RATIO >300 MG/G: Primary | ICD-10-CM

## 2019-02-07 DIAGNOSIS — R06.00 DYSPNEA: ICD-10-CM

## 2019-02-07 DIAGNOSIS — R80.9 PROTEINURIA, UNSPECIFIED TYPE: ICD-10-CM

## 2019-02-07 DIAGNOSIS — I50.9 CONGESTIVE HEART FAILURE, UNSPECIFIED HF CHRONICITY, UNSPECIFIED HEART FAILURE TYPE: ICD-10-CM

## 2019-02-07 DIAGNOSIS — I10 ESSENTIAL HYPERTENSION: ICD-10-CM

## 2019-02-07 DIAGNOSIS — E87.20 ACIDOSIS: ICD-10-CM

## 2019-02-07 DIAGNOSIS — I50.30 (HFPEF) HEART FAILURE WITH PRESERVED EJECTION FRACTION: ICD-10-CM

## 2019-02-07 DIAGNOSIS — R07.9 CHEST PAIN: ICD-10-CM

## 2019-02-07 LAB
ALBUMIN SERPL BCP-MCNC: 3.8 G/DL
ALLENS TEST: ABNORMAL
ALP SERPL-CCNC: 91 U/L
ALT SERPL W/O P-5'-P-CCNC: 13 U/L
ANION GAP SERPL CALC-SCNC: 13 MMOL/L
AST SERPL-CCNC: 18 U/L
BACTERIA #/AREA URNS AUTO: NORMAL /HPF
BASOPHILS # BLD AUTO: 0.11 K/UL
BASOPHILS NFR BLD: 1 %
BILIRUB SERPL-MCNC: 0.4 MG/DL
BILIRUB UR QL STRIP: NEGATIVE
BNP SERPL-MCNC: 131 PG/ML
BUN SERPL-MCNC: 42 MG/DL
CALCIUM SERPL-MCNC: 9.4 MG/DL
CHLORIDE SERPL-SCNC: 114 MMOL/L
CLARITY UR REFRACT.AUTO: CLEAR
CO2 SERPL-SCNC: 13 MMOL/L
COLOR UR AUTO: COLORLESS
CREAT SERPL-MCNC: 2.2 MG/DL
CREAT UR-MCNC: 25 MG/DL
DIFFERENTIAL METHOD: ABNORMAL
EOSINOPHIL # BLD AUTO: 0.3 K/UL
EOSINOPHIL NFR BLD: 2.5 %
ERYTHROCYTE [DISTWIDTH] IN BLOOD BY AUTOMATED COUNT: 13.9 %
EST. GFR  (AFRICAN AMERICAN): 28.1 ML/MIN/1.73 M^2
EST. GFR  (NON AFRICAN AMERICAN): 24.3 ML/MIN/1.73 M^2
GLUCOSE SERPL-MCNC: 125 MG/DL
GLUCOSE UR QL STRIP: NEGATIVE
HCO3 UR-SCNC: 21.7 MMOL/L (ref 24–28)
HCT VFR BLD AUTO: 40.8 %
HGB BLD-MCNC: 11.9 G/DL
HGB UR QL STRIP: ABNORMAL
HYALINE CASTS UR QL AUTO: 0 /LPF
IMM GRANULOCYTES # BLD AUTO: 0.04 K/UL
IMM GRANULOCYTES NFR BLD AUTO: 0.4 %
KETONES UR QL STRIP: NEGATIVE
LACTATE SERPL-SCNC: 0.8 MMOL/L
LEUKOCYTE ESTERASE UR QL STRIP: NEGATIVE
LYMPHOCYTES # BLD AUTO: 2.2 K/UL
LYMPHOCYTES NFR BLD: 20.1 %
MCH RBC QN AUTO: 26.9 PG
MCHC RBC AUTO-ENTMCNC: 29.2 G/DL
MCV RBC AUTO: 92 FL
MICROSCOPIC COMMENT: NORMAL
MONOCYTES # BLD AUTO: 0.7 K/UL
MONOCYTES NFR BLD: 6.1 %
NEUTROPHILS # BLD AUTO: 7.8 K/UL
NEUTROPHILS NFR BLD: 69.9 %
NITRITE UR QL STRIP: NEGATIVE
NRBC BLD-RTO: 0 /100 WBC
PCO2 BLDA: 41.5 MMHG (ref 35–45)
PH SMN: 7.33 [PH] (ref 7.35–7.45)
PH UR STRIP: 6 [PH] (ref 5–8)
PLATELET # BLD AUTO: 218 K/UL
PMV BLD AUTO: 11.1 FL
PO2 BLDA: 58 MMHG (ref 40–60)
POC BE: -4 MMOL/L
POC SATURATED O2: 88 % (ref 95–100)
POC TCO2: 23 MMOL/L (ref 24–29)
POCT GLUCOSE: 80 MG/DL (ref 70–110)
POCT GLUCOSE: 99 MG/DL (ref 70–110)
POTASSIUM SERPL-SCNC: 4.4 MMOL/L
PROT SERPL-MCNC: 8.4 G/DL
PROT UR QL STRIP: ABNORMAL
RBC # BLD AUTO: 4.43 M/UL
RBC #/AREA URNS AUTO: 4 /HPF (ref 0–4)
SAMPLE: ABNORMAL
SITE: ABNORMAL
SODIUM SERPL-SCNC: 140 MMOL/L
SODIUM UR-SCNC: 120 MMOL/L
SP GR UR STRIP: 1.01 (ref 1–1.03)
SQUAMOUS #/AREA URNS AUTO: 1 /HPF
TROPONIN I SERPL DL<=0.01 NG/ML-MCNC: 0.02 NG/ML
TROPONIN I SERPL DL<=0.01 NG/ML-MCNC: <0.006 NG/ML
URN SPEC COLLECT METH UR: ABNORMAL
WBC # BLD AUTO: 11.16 K/UL
WBC #/AREA URNS AUTO: 0 /HPF (ref 0–5)

## 2019-02-07 PROCEDURE — 93010 ELECTROCARDIOGRAM REPORT: CPT | Mod: 76,,, | Performed by: INTERNAL MEDICINE

## 2019-02-07 PROCEDURE — 25000003 PHARM REV CODE 250: Performed by: HOSPITALIST

## 2019-02-07 PROCEDURE — 99220 PR INITIAL OBSERVATION CARE,LEVL III: ICD-10-PCS | Mod: ,,, | Performed by: HOSPITALIST

## 2019-02-07 PROCEDURE — 85025 COMPLETE CBC W/AUTO DIFF WBC: CPT

## 2019-02-07 PROCEDURE — 84484 ASSAY OF TROPONIN QUANT: CPT | Mod: 91

## 2019-02-07 PROCEDURE — 99900035 HC TECH TIME PER 15 MIN (STAT)

## 2019-02-07 PROCEDURE — 99214 OFFICE O/P EST MOD 30 MIN: CPT | Mod: S$PBB,,, | Performed by: INTERNAL MEDICINE

## 2019-02-07 PROCEDURE — 83605 ASSAY OF LACTIC ACID: CPT

## 2019-02-07 PROCEDURE — 63600175 PHARM REV CODE 636 W HCPCS: Performed by: PHYSICIAN ASSISTANT

## 2019-02-07 PROCEDURE — 99285 EMERGENCY DEPT VISIT HI MDM: CPT | Mod: 27

## 2019-02-07 PROCEDURE — 63600175 PHARM REV CODE 636 W HCPCS: Performed by: HOSPITALIST

## 2019-02-07 PROCEDURE — 36415 COLL VENOUS BLD VENIPUNCTURE: CPT

## 2019-02-07 PROCEDURE — 83880 ASSAY OF NATRIURETIC PEPTIDE: CPT

## 2019-02-07 PROCEDURE — 93010 EKG 12-LEAD: ICD-10-PCS | Mod: ,,, | Performed by: INTERNAL MEDICINE

## 2019-02-07 PROCEDURE — 84484 ASSAY OF TROPONIN QUANT: CPT

## 2019-02-07 PROCEDURE — 99220 PR INITIAL OBSERVATION CARE,LEVL III: CPT | Mod: ,,, | Performed by: HOSPITALIST

## 2019-02-07 PROCEDURE — 93005 ELECTROCARDIOGRAM TRACING: CPT

## 2019-02-07 PROCEDURE — 93010 EKG 12-LEAD: ICD-10-PCS | Mod: 77,,, | Performed by: INTERNAL MEDICINE

## 2019-02-07 PROCEDURE — G0378 HOSPITAL OBSERVATION PER HR: HCPCS

## 2019-02-07 PROCEDURE — 99213 OFFICE O/P EST LOW 20 MIN: CPT | Mod: PBBFAC,25 | Performed by: INTERNAL MEDICINE

## 2019-02-07 PROCEDURE — 99999 PR PBB SHADOW E&M-EST. PATIENT-LVL III: CPT | Mod: PBBFAC,,, | Performed by: INTERNAL MEDICINE

## 2019-02-07 PROCEDURE — 99214 PR OFFICE/OUTPT VISIT, EST, LEVL IV, 30-39 MIN: ICD-10-PCS | Mod: S$PBB,,, | Performed by: INTERNAL MEDICINE

## 2019-02-07 PROCEDURE — 99285 PR EMERGENCY DEPT VISIT,LEVEL V: ICD-10-PCS | Mod: ,,, | Performed by: PHYSICIAN ASSISTANT

## 2019-02-07 PROCEDURE — 84300 ASSAY OF URINE SODIUM: CPT

## 2019-02-07 PROCEDURE — 81001 URINALYSIS AUTO W/SCOPE: CPT

## 2019-02-07 PROCEDURE — 82570 ASSAY OF URINE CREATININE: CPT

## 2019-02-07 PROCEDURE — 93010 ELECTROCARDIOGRAM REPORT: CPT | Mod: 77,,, | Performed by: INTERNAL MEDICINE

## 2019-02-07 PROCEDURE — 93010 ELECTROCARDIOGRAM REPORT: CPT | Mod: ,,, | Performed by: INTERNAL MEDICINE

## 2019-02-07 PROCEDURE — 80053 COMPREHEN METABOLIC PANEL: CPT

## 2019-02-07 PROCEDURE — 82803 BLOOD GASES ANY COMBINATION: CPT

## 2019-02-07 PROCEDURE — 99285 EMERGENCY DEPT VISIT HI MDM: CPT | Mod: ,,, | Performed by: PHYSICIAN ASSISTANT

## 2019-02-07 PROCEDURE — 99999 PR PBB SHADOW E&M-EST. PATIENT-LVL III: ICD-10-PCS | Mod: PBBFAC,,, | Performed by: INTERNAL MEDICINE

## 2019-02-07 RX ORDER — ESCITALOPRAM OXALATE 10 MG/1
10 TABLET ORAL DAILY
Status: DISCONTINUED | OUTPATIENT
Start: 2019-02-08 | End: 2019-02-08 | Stop reason: HOSPADM

## 2019-02-07 RX ORDER — ATORVASTATIN CALCIUM 10 MG/1
10 TABLET, FILM COATED ORAL DAILY
Status: DISCONTINUED | OUTPATIENT
Start: 2019-02-07 | End: 2019-02-08 | Stop reason: HOSPADM

## 2019-02-07 RX ORDER — SODIUM CHLORIDE 0.9 % (FLUSH) 0.9 %
5 SYRINGE (ML) INJECTION
Status: DISCONTINUED | OUTPATIENT
Start: 2019-02-07 | End: 2019-02-08 | Stop reason: HOSPADM

## 2019-02-07 RX ORDER — ATORVASTATIN CALCIUM 10 MG/1
10 TABLET, FILM COATED ORAL DAILY
Status: DISCONTINUED | OUTPATIENT
Start: 2019-02-08 | End: 2019-02-07

## 2019-02-07 RX ORDER — ASPIRIN 81 MG/1
81 TABLET ORAL DAILY
Status: DISCONTINUED | OUTPATIENT
Start: 2019-02-08 | End: 2019-02-08 | Stop reason: HOSPADM

## 2019-02-07 RX ORDER — ASPIRIN 325 MG
325 TABLET ORAL
Status: DISCONTINUED | OUTPATIENT
Start: 2019-02-07 | End: 2019-02-07

## 2019-02-07 RX ORDER — AMLODIPINE BESYLATE 10 MG/1
10 TABLET ORAL DAILY
Status: DISCONTINUED | OUTPATIENT
Start: 2019-02-07 | End: 2019-02-08 | Stop reason: HOSPADM

## 2019-02-07 RX ORDER — INSULIN ASPART 100 [IU]/ML
0-5 INJECTION, SOLUTION INTRAVENOUS; SUBCUTANEOUS
Status: DISCONTINUED | OUTPATIENT
Start: 2019-02-07 | End: 2019-02-08 | Stop reason: HOSPADM

## 2019-02-07 RX ORDER — GABAPENTIN 100 MG/1
100 CAPSULE ORAL DAILY
Status: DISCONTINUED | OUTPATIENT
Start: 2019-02-07 | End: 2019-02-08 | Stop reason: HOSPADM

## 2019-02-07 RX ORDER — NITROGLYCERIN 0.4 MG/1
0.4 TABLET SUBLINGUAL
Status: DISCONTINUED | OUTPATIENT
Start: 2019-02-07 | End: 2019-02-07

## 2019-02-07 RX ORDER — NITROGLYCERIN 0.4 MG/1
0.4 TABLET SUBLINGUAL
Status: COMPLETED | OUTPATIENT
Start: 2019-02-07 | End: 2019-02-07

## 2019-02-07 RX ORDER — LEVOTHYROXINE SODIUM 100 UG/1
200 TABLET ORAL DAILY
Status: DISCONTINUED | OUTPATIENT
Start: 2019-02-08 | End: 2019-02-08 | Stop reason: HOSPADM

## 2019-02-07 RX ORDER — LISINOPRIL 20 MG/1
20 TABLET ORAL DAILY
Status: DISCONTINUED | OUTPATIENT
Start: 2019-02-07 | End: 2019-02-08 | Stop reason: HOSPADM

## 2019-02-07 RX ORDER — ASPIRIN 325 MG
325 TABLET, DELAYED RELEASE (ENTERIC COATED) ORAL ONCE
Status: COMPLETED | OUTPATIENT
Start: 2019-02-07 | End: 2019-02-07

## 2019-02-07 RX ORDER — IBUPROFEN 200 MG
24 TABLET ORAL
Status: DISCONTINUED | OUTPATIENT
Start: 2019-02-07 | End: 2019-02-08 | Stop reason: HOSPADM

## 2019-02-07 RX ORDER — SODIUM CHLORIDE 0.9 % (FLUSH) 0.9 %
3 SYRINGE (ML) INJECTION EVERY 8 HOURS
Status: DISCONTINUED | OUTPATIENT
Start: 2019-02-07 | End: 2019-02-08 | Stop reason: HOSPADM

## 2019-02-07 RX ORDER — FUROSEMIDE 10 MG/ML
40 INJECTION INTRAMUSCULAR; INTRAVENOUS 2 TIMES DAILY
Status: DISCONTINUED | OUTPATIENT
Start: 2019-02-07 | End: 2019-02-08 | Stop reason: HOSPADM

## 2019-02-07 RX ORDER — NITROGLYCERIN 0.4 MG/1
0.4 TABLET SUBLINGUAL EVERY 5 MIN PRN
Status: DISCONTINUED | OUTPATIENT
Start: 2019-02-07 | End: 2019-02-08 | Stop reason: HOSPADM

## 2019-02-07 RX ORDER — FUROSEMIDE 10 MG/ML
40 INJECTION INTRAMUSCULAR; INTRAVENOUS
Status: COMPLETED | OUTPATIENT
Start: 2019-02-07 | End: 2019-02-07

## 2019-02-07 RX ORDER — GLUCAGON 1 MG
1 KIT INJECTION
Status: DISCONTINUED | OUTPATIENT
Start: 2019-02-07 | End: 2019-02-08 | Stop reason: HOSPADM

## 2019-02-07 RX ORDER — TRAZODONE HYDROCHLORIDE 50 MG/1
50 TABLET ORAL NIGHTLY PRN
Status: DISCONTINUED | OUTPATIENT
Start: 2019-02-07 | End: 2019-02-08 | Stop reason: HOSPADM

## 2019-02-07 RX ORDER — PANTOPRAZOLE SODIUM 40 MG/1
40 TABLET, DELAYED RELEASE ORAL DAILY
Status: DISCONTINUED | OUTPATIENT
Start: 2019-02-08 | End: 2019-02-08 | Stop reason: HOSPADM

## 2019-02-07 RX ORDER — CARVEDILOL 12.5 MG/1
12.5 TABLET ORAL 2 TIMES DAILY WITH MEALS
Status: DISCONTINUED | OUTPATIENT
Start: 2019-02-07 | End: 2019-02-08 | Stop reason: HOSPADM

## 2019-02-07 RX ORDER — HEPARIN SODIUM 5000 [USP'U]/ML
5000 INJECTION, SOLUTION INTRAVENOUS; SUBCUTANEOUS EVERY 8 HOURS
Status: DISCONTINUED | OUTPATIENT
Start: 2019-02-07 | End: 2019-02-08 | Stop reason: HOSPADM

## 2019-02-07 RX ORDER — IBUPROFEN 200 MG
16 TABLET ORAL
Status: DISCONTINUED | OUTPATIENT
Start: 2019-02-07 | End: 2019-02-08 | Stop reason: HOSPADM

## 2019-02-07 RX ADMIN — ASPIRIN 325 MG: 325 TABLET, DELAYED RELEASE ORAL at 06:02

## 2019-02-07 RX ADMIN — FUROSEMIDE 40 MG: 10 INJECTION, SOLUTION INTRAVENOUS at 03:02

## 2019-02-07 RX ADMIN — ATORVASTATIN CALCIUM 10 MG: 10 TABLET, FILM COATED ORAL at 04:02

## 2019-02-07 RX ADMIN — FUROSEMIDE 40 MG: 10 INJECTION, SOLUTION INTRAVENOUS at 09:02

## 2019-02-07 RX ADMIN — GABAPENTIN 100 MG: 100 CAPSULE ORAL at 04:02

## 2019-02-07 RX ADMIN — AMLODIPINE BESYLATE 10 MG: 10 TABLET ORAL at 04:02

## 2019-02-07 RX ADMIN — NITROGLYCERIN 0.4 MG: 0.4 TABLET SUBLINGUAL at 01:02

## 2019-02-07 RX ADMIN — LISINOPRIL 20 MG: 20 TABLET ORAL at 04:02

## 2019-02-07 RX ADMIN — CARVEDILOL 12.5 MG: 12.5 TABLET, FILM COATED ORAL at 06:02

## 2019-02-07 NOTE — ED PROVIDER NOTES
Encounter Date: 2/7/2019       History     Chief Complaint   Patient presents with    Abnormal Lab     elevated creatinine, recent hospital ICU admission for htn, and was on cardene.      Ms Quezada is a 57 yo  male patient with PMHx of CVA, HTN, CKD, DM that presents to the ED for abnormal lab results after being seen by Nephrology this morning. Pt's baseline creatinine is 1.7 and last creatinine 2.5. Pt currently complaining of chest heaviness, cough and worsening shortness of breath on exertion over the last few days. Pt also reports bilateral lower extremity swelling. Pt localizes chest discomfort to left side of chest. Describes it as a constant heaviness. Rates the discomfort as 5/10. Worse on exertion with no alleviating factors. Pt had recent ICU admission on 1/14/19 for hypertensive emergency and ARUNA. Pt denies any fevers, chills, body aches, headaches, numbness, changes in vision.          Review of patient's allergies indicates:   Allergen Reactions    Bactrim [sulfamethoxazole-trimethoprim] Hives    Codeine Hives    Penicillins Hives    Shellfish containing products Itching    Sulfa (sulfonamide antibiotics) Hives     Past Medical History:   Diagnosis Date    CHF (congestive heart failure)     CKD stage G3a/A3, GFR 45-59 and albumin creatinine ratio >300 mg/g 5/16/2018    CVA (cerebral vascular accident) 2017    Essential hypertension 4/9/2018    Factor XI deficiency 1978    GIB (gastrointestinal bleeding) 10/18/2018    History of hepatitis C     s/p succesful Rx w/ [facility] by Dr Herbert  - SVR12 (cure) 2016    Hyperlipidemia     Mixed hyperlipidemia 4/9/2018    Postoperative hypothyroidism     Proteinuria 4/9/2018    Type 2 diabetes mellitus with circulatory disorder, with long-term current use of insulin 4/9/2018     Past Surgical History:   Procedure Laterality Date    BACK SURGERY      COLONOSCOPY  11/13/2015    Dr Chino Herbert - brian prep. internal hemorrhoids.  diverticulosis of sigmois. NO polyps. repeat due 2025    galbladder      HYSTERECTOMY      OOPHORECTOMY      one    TONSILLECTOMY       Family History   Problem Relation Age of Onset    Diabetes type II Mother      Social History     Tobacco Use    Smoking status: Never Smoker    Smokeless tobacco: Never Used   Substance Use Topics    Alcohol use: Yes     Alcohol/week: 2.4 oz     Types: 4 Cans of beer per week     Comment: weekly    Drug use: No     Review of Systems   Constitutional: Positive for chills. Negative for fever.   HENT: Negative for congestion, rhinorrhea, sinus pressure, sinus pain and sore throat.    Eyes: Negative for pain and visual disturbance.   Respiratory: Positive for cough and shortness of breath.    Cardiovascular: Positive for chest pain and leg swelling. Negative for palpitations.   Gastrointestinal: Positive for diarrhea and nausea. Negative for abdominal pain, constipation and vomiting.   Genitourinary: Negative for dysuria and frequency.   Musculoskeletal: Negative for back pain, myalgias, neck pain and neck stiffness.   Skin: Negative for pallor and rash.   Neurological: Negative for dizziness, syncope, weakness, light-headedness, numbness and headaches.   Psychiatric/Behavioral: Negative for confusion.       Physical Exam     Initial Vitals [02/07/19 0923]   BP Pulse Resp Temp SpO2   (!) 159/78 73 18 97.8 °F (36.6 °C) 99 %      MAP       --         Physical Exam    Constitutional: She appears well-developed and well-nourished. She is cooperative. She does not appear ill. No distress.   HENT:   Head: Normocephalic and atraumatic.   Eyes: Pupils are equal, round, and reactive to light.   Neck: Normal range of motion. Neck supple. JVD present.   Cardiovascular: Normal rate. Exam reveals no gallop and no friction rub.    No murmur heard.  1+ pitting edema BLE   Pulmonary/Chest: Effort normal. No respiratory distress. She has no wheezes. She has no rhonchi. She has no rales.    Abdominal: Soft. There is no tenderness. There is no rigidity, no rebound, no guarding and no CVA tenderness.   Musculoskeletal: Normal range of motion.   Neurological: She is alert and oriented to person, place, and time.   Skin: Skin is warm and dry. No rash noted.   Psychiatric: She has a normal mood and affect. Her speech is normal and behavior is normal.         ED Course   Procedures  Labs Reviewed   CBC W/ AUTO DIFFERENTIAL - Abnormal; Notable for the following components:       Result Value    Hemoglobin 11.9 (*)     MCH 26.9 (*)     MCHC 29.2 (*)     Gran # (ANC) 7.8 (*)     All other components within normal limits   COMPREHENSIVE METABOLIC PANEL - Abnormal; Notable for the following components:    Chloride 114 (*)     CO2 13 (*)     Glucose 125 (*)     BUN, Bld 42 (*)     Creatinine 2.2 (*)     eGFR if  28.1 (*)     eGFR if non  24.3 (*)     All other components within normal limits   URINALYSIS, REFLEX TO URINE CULTURE - Abnormal; Notable for the following components:    Color, UA Colorless (*)     Protein, UA 2+ (*)     Occult Blood UA 1+ (*)     All other components within normal limits    Narrative:     Preferred Collection Type->Urine, Clean Catch   B-TYPE NATRIURETIC PEPTIDE - Abnormal; Notable for the following components:     (*)     All other components within normal limits   ISTAT PROCEDURE - Abnormal; Notable for the following components:    POC PH 7.326 (*)     POC HCO3 21.7 (*)     POC SATURATED O2 88 (*)     POC TCO2 23 (*)     All other components within normal limits   SODIUM, URINE, RANDOM    Narrative:     Preferred Collection Type->Urine, Clean Catch   CREATININE, URINE, RANDOM    Narrative:     Preferred Collection Type->Urine, Clean Catch   LACTIC ACID, PLASMA   TROPONIN I    Narrative:     TROP add on per Nurse/Tech Jere 14:28  02/07/2019    URINALYSIS MICROSCOPIC    Narrative:     Preferred Collection Type->Urine, Clean Catch   POCT GLUCOSE  MONITORING CONTINUOUS     EKG Readings: (Independently Interpreted)   Rhythm: Normal Sinus Rhythm. Heart Rate: 72. ST Segments: Normal ST Segments. T Waves: Normal. Axis: Normal.     ECG Results          EKG 12-LEAD (In process)  Result time 02/07/19 17:26:24    In process by Interface, Lab In [facility] (02/07/19 17:26:24)                 Narrative:    Test Reason : I50.30,    Vent. Rate : 080 BPM     Atrial Rate : 080 BPM     P-R Int : 192 ms          QRS Dur : 090 ms      QT Int : 380 ms       P-R-T Axes : 055 053 053 degrees     QTc Int : 438 ms    Normal sinus rhythm  Possible Anterior infarct ,age undetermined  Abnormal ECG  When compared with ECG of 07-FEB-2019 10:28,  No significant change was found    Referred By: [email or web address]   SELF           Confirmed By:                              EKG 12-lead (Final result)  Result time 02/07/19 12:55:50    Final result by Interface, Lab In [facility] (02/07/19 12:55:50)                 Narrative:    Test Reason : R07.9,    Vent. Rate : 072 BPM     Atrial Rate : 072 BPM     P-R Int : 190 ms          QRS Dur : 092 ms      QT Int : 392 ms       P-R-T Axes : 033 049 049 degrees     QTc Int : 429 ms    Normal sinus rhythm  Normal ECG  When compared with ECG of 14-JAN-2019 13:12,  No significant change was found  Confirmed by IOANA SOL MD ([id number]) on 2/7/2019 12:55:38 PM    Referred By: [email or web address]   SELF           Confirmed By:IOANA SOL MD                            Imaging Results          US Retroperitoneal Complete (Final result)  Result time 02/07/19 12:51:26   Procedure changed from US Retroperitoneal Limited     Final result by Geoff Brock MD (02/07/19 12:51:26)                 Impression:      Bilateral medical renal disease.  No evidence of hydronephrosis or nephrolithiasis.    Electronically signed by resident: Chicho Rivas  Date:    02/07/2019  Time:    11:47    Electronically signed by: Geoff Brock MD  Date:    02/07/2019  Time:    12:51              Narrative:    EXAMINATION:  US RETROPERITONEAL COMPLETE    CLINICAL HISTORY:  breanna Acute kidney failure, unspecified    TECHNIQUE:  Ultrasound of the kidneys and urinary bladder was performed including color flow and Doppler evaluation of the kidneys.    COMPARISON:  Retroperitoneal ultrasound 06/29/2016.    FINDINGS:  The right kidney measures 12.8 cm while the left kidney measures 11.5 cm.  There is decreased corticomedullary differentiation, bilaterally.  Perfusion is decreased.  The arterial resistive indices are elevated measuring 0.85 on the right and 0.83 on the left while the splenic resistive index measures 0.68.  No evidence of masses, renal stones or hydronephrosis.    Urinary bladder is unremarkable.                               X-Ray Chest PA And Lateral (Final result)  Result time 02/07/19 10:21:27    Final result by Ishmael Pérez MD (02/07/19 10:21:27)                 Impression:      See above      Electronically signed by: Ishmael Pérez MD  Date:    02/07/2019  Time:    10:21             Narrative:    EXAMINATION:  XR CHEST PA AND LATERAL    CLINICAL HISTORY:  Dyspnea, unspecified    TECHNIQUE:  PA and lateral views of the chest were performed.    COMPARISON:  Non 01/14/2019 e    FINDINGS:  Mild cardiomegaly.  The lungs are clear.  No pleural effusion                                 Medical Decision Making:   Initial Assessment:   Ms Quezada is a 57 yo  male patient with PMHx of CVA, HTN, CKD, DM that presents to the ED for abnormal lab results after being seen by Nephrology this morning. Pt's baseline creatinine is 1.7 and last creatinine 2.5. Pt currently complaining of chest heaviness, cough and worsening shortness of breath on exertion over the last few days. Pt also reports bilateral lower extremity swelling. Pt localizes chest discomfort to left side of chest. Describes it as a constant heaviness. Rates the discomfort as 5/10. Worse on exertion with no alleviating factors. Pt  "had recent ICU admission on 19 for hypertensive emergency and ARUNA.     Differential Diagnosis:   CHF exacerbation  ACS  ARUNA  Electrolyte abnormality    Clinical Tests:   Lab Tests: Ordered and Reviewed  Radiological Study: Ordered and Reviewed  Medical Tests: Ordered and Reviewed  ED Management:  Pt hemodynamically stable on arrival to ED. Pt well appearing, pleasant and in no acute distress. 12 lead, labs, CXR, US retroperitoneal ordered. 12 lead shows NSR no STEMI. Pt given sublingual nitro and 40 mg lasix IV. CXR reveals mild cardiomegaly with mild edema. US reveals bilateral medical renal disease. . Troponin 0.017. Pt acidotic with bicarb of 13. Creatinine elevated at 2.2 but improved from 2.5. Acidosis likely due to ARUNA. Pt reports alleviation of symptoms after nitro. Plan is to place patient in observation for CHF exacerbation with IV diuresis and cardiology eval. Discussed patient with Hospital Medicine and patient placed in observation for further treatment and management.               Attending Attestation:     Physician Attestation Statement for NP/PA:   I have conducted a face to face encounter with this patient in addition to the NP/PA, due to Medical Complexity    Other NP/PA Attestation Additions:      Medical Decision Makin yo W with pmhx CKD III, DM, HTN, CVA presents being referred from nephrology clinic for ARUNA on CKD.  The nephrologist was concern for CRS, recommended urine studies, renal ultrasound, IV diuresis, Cardiology consult.  Patient was recently admitted for hypertensive emergency and ARUNA.  Her spironolactone was held secondary to increasing creatinine.  She endorsed 15-20 lb weight gain over the past few weeks and 4 pillow orthopnea.  Additionally complaining of shortness of breath and "chest heaviness" for 1 week.  On exam she is appearing.  She has trace JVD.  Lungs are clear.  1+ lower extremity edema. Chest x-ray with mild fluid overload, I am able to appreciate a " fissure on the right.  BNP is elevated but slightly improved from previous.  Creatinine somewhat stable at 2.2.  However she is noted to be acidotic with a bicarb of 13.  Upon speaking with Dr. Goldsmith, pt became extremely dyspneic upon ambulation to clinic.  VBG with mild metabolic acidosis.  Likely secondary to ARUNA. Lactic acid normal. Symptoms are borderline antionette admission. Will place in obs for CHF exacerbation. Cardiology consult.                    Clinical Impression:   The primary encounter diagnosis was ARUNA (acute kidney injury). Diagnoses of Chest pain, Dyspnea, Acidosis, Acute on chronic congestive heart failure, unspecified heart failure type, CHF exacerbation, and (HFpEF) heart failure with preserved ejection fraction were also pertinent to this visit.      Disposition:   Disposition: Placed in Observation  Condition: Stable                        Dheeraj Younger PA-C  02/07/19 1924

## 2019-02-07 NOTE — ED NOTES
Pt. Incontinent of urine in bed despite purewick placement. Linens changed per RNs. No distress noted at this time, on cardiac and o2 monitor, call light within reach, will continue to monitor.

## 2019-02-07 NOTE — PROGRESS NOTES
Subjective:       Patient ID: Kathie Quezada is a 58 y.o. Black or  female who presents for f/u evaluation of ARUNA on CKD  HPI   Ms. Quezada is a 59 yo AAF with HTN, T2DM, and +HCV Ab who was referred to nephrology by her endocrinologist for evaluation of proteinuria and decreased eGFR. She has recently transferred her [place] [facility] [place] [facility]. Chart review reveals only two data points: sCr 0.8 in 2015 and sCr 1.3 (with eGFR 53) in 4/2018. Similarly, ACR was 47.8 in 2006 and most recently 2788 on 4/2018.     She was diagnosed with T2DM prior to 2005. She has been on insulin since ~2008. Diabetes has been decently controlled with highest A1c in the 7-8s. Most recent A1c was 5.2%. Last eye exam on 5/2/18 with bilateral diabetic retinopathy. She was initially diagnosed with this in 2016.   She was diagnosed with HTN in the 1980s. BP has been controlled on-and-off; reports a h/o dietary non-compliance which is no improved. +h/o hospitalization for HTN emergency; + h/o CVA.   She has a h/o HCV Ab+. She completed Harvoni treatment in 2015.   She denies personal h/o kidney stones, recurrent UTIs, or significant NSAID use. She denies a family h/o kidney disease or ESRD.     Interval Hx  Comes in today for f/u evaluation. Recently admitted to hospital with hypertensive emergency needing ICU/cardene gtt. Since admission, patient reports worsening SOB, IBRAHIM on minimal exertion, orthopnea, 15-20lbs weight gain. Creatinine 2.5 from baseline 1.7. No frequency or hematuria, but reports decreased UOP. Her appetite is not good and denies any nausea, vomiting, diarrhea, abdominal pain, melena or bright red bleeding per rectum. BP stable on current meds, Currently off chlorthalidone 2/2 worsening renal function. Denies NSAIDs.     Past Medical History:   Diagnosis Date    CHF (congestive heart failure)     CKD stage G3a/A3, GFR 45-59 and albumin creatinine ratio >300 mg/g 5/16/2018    CVA (cerebral vascular  accident) 2017    Essential hypertension 4/9/2018    Factor XI deficiency 1978    GIB (gastrointestinal bleeding) 10/18/2018    History of hepatitis C     s/p succesful Rx w/ Harvoni by Dr Herbert  - SVR12 (cure) 2016    Hyperlipidemia     Mixed hyperlipidemia 4/9/2018    Postoperative hypothyroidism     Proteinuria 4/9/2018    Type 2 diabetes mellitus with circulatory disorder, with long-term current use of insulin 4/9/2018     Past Surgical History:   Procedure Laterality Date    BACK SURGERY      COLONOSCOPY  11/13/2015    Dr Chino torres prep. internal hemorrhoids. diverticulosis of sigmois. NO polyps. repeat due 2025    galbladder      HYSTERECTOMY      OOPHORECTOMY      one    TONSILLECTOMY       Family History   Problem Relation Age of Onset    Diabetes type II Mother      Social History     Socioeconomic History    Marital status:      Spouse name: Not on file    Number of children: Not on file    Years of education: Not on file    Highest education level: Not on file   Social Needs    Financial resource strain: Not on file    Food insecurity - worry: Not on file    Food insecurity - inability: Not on file    Transportation needs - medical: Not on file    Transportation needs - non-medical: Not on file   Occupational History    Not on file   Tobacco Use    Smoking status: Never Smoker    Smokeless tobacco: Never Used   Substance and Sexual Activity    Alcohol use: Yes     Alcohol/week: 2.4 oz     Types: 4 Cans of beer per week     Comment: weekly    Drug use: No    Sexual activity: Not on file   Other Topics Concern    Not on file   Social History Narrative    Not on file         Review of Systems    Const: no fevers or chills  Endo: stays cold all the time.   Eyes: +vision changes and blurry vision; holding off on laser surgery  HENT: no sore throat. +rhinorrhea and hoarseness ever since thyroid surgery  Heme: +easy bleeding/bruising 2/2 factor XI deficiency  CV:  "no chest pain. +BLE edema that started a few months ago  Resp: + SOB , dry cough, +orthopnea   GI: +diarrhea; no constipation  : No dysuria or hematuria.   Neuro: + neuropathy in bilateral feet; rarely in hands.   Skin: no new rashes or lesions; +dry skin      Objective:       Current Outpatient Medications:     amLODIPine (NORVASC) 10 MG tablet, Take 1 tablet (10 mg total) by mouth once daily., Disp: 30 tablet, Rfl: 11    atorvastatin (LIPITOR) 10 MG tablet, TK 1 T PO QHS, Disp: , Rfl: 1    blood sugar diagnostic Strp, To check BG 1 times daily, to use with insurance preferred meter, Disp: 35 strip, Rfl: 11    carvedilol (COREG) 25 MG tablet, Take 25 mg by mouth 2 (two) times daily with meals. , Disp: , Rfl:     chlorthalidone (HYGROTEN) 25 MG Tab, Take 1 tablet (25 mg total) by mouth once daily., Disp: 30 tablet, Rfl: 3    escitalopram oxalate (LEXAPRO) 10 MG tablet, Take 10 mg by mouth once daily., Disp: , Rfl:     gabapentin (NEURONTIN) 100 MG capsule, Take 1 capsule (100 mg total) by mouth 3 (three) times daily., Disp: 90 capsule, Rfl: 3    hydrALAZINE (APRESOLINE) 25 MG tablet, Take 1 tablet daily for Systolic (top number) Blood Pressure greater than 160, Disp: 30 tablet, Rfl: 0    lancets Misc, To check BG 1 times daily, to use with insurance preferred meter, Disp: 50 each, Rfl: 11    lisinopril (PRINIVIL,ZESTRIL) 40 MG tablet, Take 40 mg by mouth once daily., Disp: , Rfl:     pantoprazole (PROTONIX) 40 MG tablet, Take 1 tablet (40 mg total) by mouth once daily., Disp: 90 tablet, Rfl: 3    pen needle, diabetic (BD ULTRA-FINE OLGA LIDIA PEN NEEDLE) 32 gauge x 5/32" Ndle, To use with Lantus solostar insulin pens, Disp: 100 each, Rfl: 3    silver sulfADIAZINE 1% (SILVADENE) 1 % cream, Apply 1 application topically 2 (two) times daily., Disp: , Rfl:     SYNTHROID 200 mcg tablet, Take 1 tablet (200 mcg total) by mouth once daily. BRAND NAME ONLY, Disp: 30 tablet, Rfl: 11    traZODone (DESYREL) 50 MG " tablet, Take 50 mg by mouth daily as needed., Disp: , Rfl: 0    Physical Exam    There were no vitals taken for this visit.  General: WDWN AAF in NAD  Eyes: EOMI, clear conjunctivae  HENT: NC/AT. MMM.  Neck: supple, no thyromegaly  Lymph: no cervical or supraclavicular LAD  CV: RRR. BLE with 2+ pitting edema and chronic venous stasis changes  Resp: Decreased breath sounds. Dyspnea. Mild bibasilar crackles.   Abd: Soft, NTND  Skin: warm, normal turgor. No visible rash  Neuro: AAO. No focal deficits      Assessment:       No diagnosis found.    Plan:   1. ARUNA on CKD:   - baseline creatinine ~1.7, last creatinine 2.5, not clear etiology at this time, possibly CRS. Will send patient to ER for possible admission for IV diuresis and renal function monitoring in the setting of ARUNA and volume overload. Will need urine studies, renal US, CXR and consider cardiology consultation.   - proteinuria present since at least 2006; nephrotic range.  - most likely related to diabetic nephropathy; especially given diabetic retinopathy. Cannot r/o contribution from HTN given significant history of HTN-related complications.   - HTN: goal BP < 130/80       Lab Results   Component Value Date    CREATININE 2.5 (H) 02/04/2019       Protein Creatinine Ratios: see above. Continue max dose ACEi. Educated patient about low salt diet. Will repeat.     Prot/Creat Ratio, Ur   Date Value Ref Range Status   08/23/2018 3.85 (H) 0.00 - 0.20 Final   05/16/2018 4.54 (H) 0.00 - 0.20 Final       Acid-Base: wnl.   Lab Results   Component Value Date     02/04/2019    K 4.5 02/04/2019    CO2 21 (L) 02/04/2019     2. HTN: Blood pressures stable    3. Renal osteodystrophy: Will re-evaluate upon progression of disease.   Lab Results   Component Value Date    .0 (H) 08/23/2018    CALCIUM 8.9 02/04/2019    PHOS 4.0 01/17/2019       4. Anemia: stable   Lab Results   Component Value Date    HGB 12.8 01/17/2019        5. DM:  Last HbA1C controlled.  Continue current management.   Lab Results   Component Value Date    HGBA1C 5.4 01/31/2019       6. Lipid management: on statin   Lab Results   Component Value Date    LDLCALC 115.2 10/18/2018       RTC in 3 months with labs.   Will sent to ED for evaluation of worsening SOB, volume overload, ARUNA.

## 2019-02-07 NOTE — ED NOTES
Pt. Ambulatory to bathroom with steady gait. Urine sample obtained. Placed back in stretcher on cardiac and o2 monitor. Call light within reach, will continue to monitor.

## 2019-02-07 NOTE — ED NOTES
Pt's first and last name and birthday confirmed.   LOC: The patient is awake, alert and aware of environment with an appropriate affect, the patient is oriented x 3 and speaking appropriately.  APPEARANCE: Patient resting comfortably and in no acute distress, patient is clean and well groomed. Pt is obese.   SKIN: The skin is warm and dry, patient has normal skin turgor and moist mucus membranes, skin intact, no breakdown or brusing noted.  MUSKULOSKELETAL: Patient moving all extremities well, no obvious swelling or deformities noted.  RESPIRATORY: Airway is open and patent, respirations are spontaneous, patient has a normal effort and rate. Breath sounds are clear and equal bilaterally.  CARDIAC: Normal heart sounds. peripheral edema noted. Pt reports recent weight gain.   ABDOMEN: Soft and non tender to palpation, no distention noted. Bowel sounds present.   NEURO: No neuro deficits, hand grasp equal, no drift noted, no facial droop noted. Speech is clear.

## 2019-02-07 NOTE — H&P
"Hospital Medicine  History and Physical Exam    Team: [facility] A Porter Norton MD  Admit Date: 2/7/2019  Principal Problem:  <principal problem not specified>   Patient information was obtained from patient and ER records.   Primary care Physician: Elan Price MD  Code status: Full Code    HPI:   Kathie Quezada is a 58 y.o. woman with a history of HFpEF (EF: 60% 1/19), CKD Stage G3a/A3 c/b Nephrotic Range Proteinuria, DM2 (A1c: 5.2%) not taking Insulin at home, HTN, HLD, Hypothyroidism (s/p Thyroidectomy), GERD, Hx of CVA *(no residual deficits) and Hx HCV s/p Harvoni (2015) with reported undetectable VL who presents to [facility] from Nephrology Clinic for progressively worsening SOB, IBRAHIM, and LE edema for the past month. She reports that she previously had "no SOB" and no limitations to ADL's but over the past month has progressed to the point where she now gets breathless going to the bathroom. She endorses orthopnea and PND as well as RUQ discomfort over same time course. She also reports chest heaviness that began this morning prior to clinic appointment. The chest pain is substernal, pressure-like in quality and was relieved with SL nitroglycerin. She reports similar chest pain with her last CHF exacerbation. In nephrology clinic, labs showed ARUNA on CKD with concern for cardiorenal syndrome in the setting of gross volume overload. She was admitted to Hospital Medicine for further management.     She denies fevers, chills, and night sweats. She denies recent melena, BRBPR, and hemoptysis, though she does note an admission to [facility] in 10/18 for BRBPR but denies needing blood transfusion of undergoing EGD/Colonoscopy. Her Hgb appears at baseline.    Past Medical History: Patient has a past medical history of CHF (congestive heart failure), CKD stage G3a/A3, GFR 45-59 and albumin creatinine ratio >300 mg/g (5/16/2018), CVA (cerebral vascular accident) (2017), Essential hypertension (4/9/2018), " Factor XI deficiency (1978), GIB (gastrointestinal bleeding) (10/18/2018), History of hepatitis C, Hyperlipidemia, Mixed hyperlipidemia (4/9/2018), Postoperative hypothyroidism, Proteinuria (4/9/2018), and Type 2 diabetes mellitus with circulatory disorder, with long-term current use of insulin (4/9/2018).    Past Surgical History: Patient has a past surgical history that includes Hysterectomy; galbladder; Back surgery; Tonsillectomy; Colonoscopy (11/13/2015); and Oophorectomy.    Social History: Patient reports that  has never smoked. she has never used smokeless tobacco. She reports that she drinks about 2.4 oz of alcohol per week. She reports that she does not use drugs.    Family History: family history includes Diabetes type II in her mother.    Medications: reviewed     Allergies: Patient is allergic to bactrim [sulfamethoxazole-trimethoprim]; codeine; penicillins; shellfish containing products; and sulfa (sulfonamide antibiotics).    ROS  Pain Scale: 8 /10 chest pressure  Constitutional: no fever or chills  Respiratory: no cough, positive shortness of breath  Cardiovascular: positive chest pain negative palpitations  Gastrointestinal: no nausea or vomiting, no abdominal pain or change in bowel habits  Genitourinary: no hematuria or dysuria  Integument/Breast: no rash or pruritis  Hematologic/Lymphatic: no easy bruising or lymphadenopathy  Musculoskeletal: no arthralgias or myalgias  Neurological: no seizures or tremors  Behavioral/Psych: no depression or anxiety    PEx  Temp:  [97.8 °F (36.6 °C)]   Pulse:  [72-76]   Resp:  [12-19]   BP: (140-188)/(74-93)   SpO2:  [98 %-100 %]   Body mass index is 35.87 kg/m².   No intake or output data in the 24 hours ending 02/07/19 1559    General appearance: no distress, siting up in bed, O2 sat 100% on RA, morbidly obese  Mental status: Alert and oriented x 3  HEENT:  conjunctivae/corneas clear, PERRL, JVD present  Pulm:   normal respiratory effort, slight crackles at  bilateral lung bases, though difficult to auscultate given body habitus  Card: RRR, S1, S2 normal, no murmur, click, rub or gallop  Abd: soft, mil RUQ ttp, ND, BS present; no masses, no organomegaly  Ext: 2+ pitting edema from ankles to knees  Pulses: 2+, symmetric  Skin: color, texture, turgor normal. No rashes or lesions  Neuro: CN II-XII grossly intact, no focal numbness or weakness, normal strength and tone     Recent Results (from the past 24 hour(s))   CBC auto differential    Collection Time: 02/07/19 10:40 AM   Result Value Ref Range    WBC 11.16 3.90 - 12.70 K/uL    RBC 4.43 4.00 - 5.40 M/uL    Hemoglobin 11.9 (L) 12.0 - 16.0 g/dL    Hematocrit 40.8 37.0 - 48.5 %    MCV 92 82 - 98 fL    MCH 26.9 (L) 27.0 - 31.0 pg    MCHC 29.2 (L) 32.0 - 36.0 g/dL    RDW 13.9 11.5 - 14.5 %    Platelets 218 150 - 350 K/uL    MPV 11.1 9.2 - 12.9 fL    Immature Granulocytes 0.4 0.0 - 0.5 %    Gran # (ANC) 7.8 (H) 1.8 - 7.7 K/uL    Immature Grans (Abs) 0.04 0.00 - 0.04 K/uL    Lymph # 2.2 1.0 - 4.8 K/uL    Mono # 0.7 0.3 - 1.0 K/uL    Eos # 0.3 0.0 - 0.5 K/uL    Baso # 0.11 0.00 - 0.20 K/uL    nRBC 0 0 /100 WBC    Gran% 69.9 38.0 - 73.0 %    Lymph% 20.1 18.0 - 48.0 %    Mono% 6.1 4.0 - 15.0 %    Eosinophil% 2.5 0.0 - 8.0 %    Basophil% 1.0 0.0 - 1.9 %    Differential Method Automated    Comprehensive metabolic panel    Collection Time: 02/07/19 10:40 AM   Result Value Ref Range    Sodium 140 136 - 145 mmol/L    Potassium 4.4 3.5 - 5.1 mmol/L    Chloride 114 (H) 95 - 110 mmol/L    CO2 13 (L) 23 - 29 mmol/L    Glucose 125 (H) 70 - 110 mg/dL    BUN, Bld 42 (H) 6 - 20 mg/dL    Creatinine 2.2 (H) 0.5 - 1.4 mg/dL    Calcium 9.4 8.7 - 10.5 mg/dL    Total Protein 8.4 6.0 - 8.4 g/dL    Albumin 3.8 3.5 - 5.2 g/dL    Total Bilirubin 0.4 0.1 - 1.0 mg/dL    Alkaline Phosphatase 91 55 - 135 U/L    AST 18 10 - 40 U/L    ALT 13 10 - 44 U/L    Anion Gap 13 8 - 16 mmol/L    eGFR if African American 28.1 (A) >60 mL/min/1.73 m^2    eGFR if non   24.3 (A) >60 mL/min/1.73 m^2   Lactic acid, plasma    Collection Time: 02/07/19 12:50 PM   Result Value Ref Range    Lactate (Lactic Acid) 0.8 0.5 - 2.2 mmol/L   ISTAT PROCEDURE    Collection Time: 02/07/19 12:55 PM   Result Value Ref Range    POC PH 7.326 (L) 7.35 - 7.45    POC PCO2 41.5 35 - 45 mmHg    POC PO2 58 40 - 60 mmHg    POC HCO3 21.7 (L) 24 - 28 mmol/L    POC BE -4 -2 to 2 mmol/L    POC SATURATED O2 88 (L) 95 - 100 %    POC TCO2 23 (L) 24 - 29 mmol/L    Sample VENOUS     Site Other     Allens Test N/A    Brain natriuretic peptide    Collection Time: 02/07/19  1:20 PM   Result Value Ref Range     (H) 0 - 99 pg/mL   Troponin I    Collection Time: 02/07/19  2:28 PM   Result Value Ref Range    Troponin I 0.017 0.000 - 0.026 ng/mL   Urinalysis, Reflex to Urine Culture Urine, Clean Catch    Collection Time: 02/07/19  2:29 PM   Result Value Ref Range    Specimen UA Urine, Clean Catch     Color, UA Colorless (A) Yellow, Straw, Rochelle    Appearance, UA Clear Clear    pH, UA 6.0 5.0 - 8.0    Specific Gravity, UA 1.010 1.005 - 1.030    Protein, UA 2+ (A) Negative    Glucose, UA Negative Negative    Ketones, UA Negative Negative    Bilirubin (UA) Negative Negative    Occult Blood UA 1+ (A) Negative    Nitrite, UA Negative Negative    Leukocytes, UA Negative Negative   Sodium, urine, random    Collection Time: 02/07/19  2:29 PM   Result Value Ref Range    Sodium Urine Random 120 20 - 250 mmol/L   Creatinine, urine, random    Collection Time: 02/07/19  2:29 PM   Result Value Ref Range    Creatinine, Random Ur 25.0 15.0 - 325.0 mg/dL   Urinalysis Microscopic    Collection Time: 02/07/19  2:29 PM   Result Value Ref Range    RBC, UA 4 0 - 4 /hpf    WBC, UA 0 0 - 5 /hpf    Bacteria, UA None None-Occ /hpf    Squam Epithel, UA 1 /hpf    Hyaline Casts, UA 0 0-1/lpf /lpf    Microscopic Comment SEE COMMENT        Active Hospital Problems    Diagnosis  POA    CHF exacerbation [I50.9]  Yes    ARUNA (acute  kidney injury) [N17.9]  Yes    History of hepatitis C [Z86.19]  Yes     s/p succesful Rx w/ Porsha  - SVR12 (cure) 2016      CKD stage G3a/A3, GFR 45-59 and albumin creatinine ratio >300 mg/g [N18.3]  Yes    Postoperative hypothyroidism [E89.0]  Yes    Type 2 diabetes mellitus with circulatory disorder, with long-term current use of insulin [E11.59, Z79.4]  Not Applicable    Essential hypertension [I10]  Yes    Mixed hyperlipidemia [E78.2]  Yes    Nephrotic range proteinuria [R80.9]  Yes      Resolved Hospital Problems   No resolved problems to display.       Assessment and Plan:  Kathie Quezada is a 58 y.o. woman with a history of HFpEF (EF: 60% 1/19), CKD Stage G3a/A3 c/b Nephrotic Range Proteinuria, DM2 (A1c: 5.2%) not taking Insulin at home, HTN, HLD, Hypothyroidism (s/p Thyroidectomy), GERD, Hx of CVA (no residual deficits) and Hx HCV s/p Porsha (2015) with reported undetectable VL who presents to [facility] from Nephrology Clinic for progressively worsening SOB, IBRAHIM, and LE edema for the past month Admitted to Hospital Medicine for management of gross volume overload and presumed Heart Failure Exacerbation    Acute Exacerbation of HFpEF (EF: 60% 1/19)  Volume Overload  Atypical Chest Pain  Concern for Cardio-Renal Syndrome  - BNP mildly elevated - 131, CXR with mild pulmonary edema and slight cardiomegaly. PE with + JVD and 2+ bilateral LE pitting edema. Suspect chest discomfort related to volume overload and not ACS but will evaluate further per below  - Initial troponin negative and EKG reviewed with no ischemic changes - will repeat both at 4 hours  - Nitroglycerin SL PRN chest  discomfort  - Diuresis with Lasix 40 mg IV BID for now, strict I/O's  - Cardiac, fluid restricted diet  - Repeat TTE  - Telemetry for at least 24 hours, will discontinue as indicated  -  mg x1 > 81 mg daily, Atorvastatin 10 mg daily, Carvedilol 12.5 mg BID (half of home dose), Lisinopril 10 mg daily  - Daily BP,  Mg: K>4, Mg >2    ARUNA on CKD Stage G3a/A3   C/b Nephrotic Range Proteinuria  C/b Secondary Hyperparathyroidism  C/b Vitamin D Insufficiency  - Diuresis per above  - avoid nephrotoxic meds  - renally-dose all meds  - strict Is/Os  - Previous extensive CKD workup indicates that HTN and DM2 etiologies of CKD (JB, cryoglobulins, SPEP/UPEP, ANCA's, and complement all wnl's)  - Daily BMP, trend creatinine    DM2 (A1c: 5.2%) not taking home Insulin, without Hyperglycemia  - Accuchecks q AC/HS  - Low dose SSI for now, not taking Insulin at home and most recent A1c at goal, though BG's better marker for control in this patient with CKD    HTN  - Amlodipine 10 mg daily, Carvedilol 12.5 mg BID (half of home dose), and Lisinopril 10 mg daily  - Goal <130/80    HLD  - ASA 81 mg daily,  Atorvastatin 10 mg daily  - Lipid Panel utd    Hypothyroidism (s/p Thyroidectomy)  - Synthroid 200 mcg daily  - Recent TSH/FT4 wnl's    GERD  - Protonix 40 mg daily    Hx of CVA (no residual deficits)  - ASA 81 mg daily, Atorvastatin 10 mg dinora;y    DVT PPx: Heparin SubQ TID    Diet: Cardiac, Diabetic, fluid restricted  Dispo: Pending PT/OT evaluation, possibly home with BOOM Norton MD  Hospital Medicine Staff  [facility]   Pager: [phone]

## 2019-02-08 VITALS
WEIGHT: 246 LBS | BODY MASS INDEX: 35.22 KG/M2 | HEART RATE: 78 BPM | HEIGHT: 70 IN | DIASTOLIC BLOOD PRESSURE: 87 MMHG | TEMPERATURE: 98 F | SYSTOLIC BLOOD PRESSURE: 135 MMHG | RESPIRATION RATE: 16 BRPM | OXYGEN SATURATION: 98 %

## 2019-02-08 PROBLEM — N17.9 AKI (ACUTE KIDNEY INJURY): Status: RESOLVED | Noted: 2019-01-17 | Resolved: 2019-02-08

## 2019-02-08 PROBLEM — I50.9 CHF EXACERBATION: Status: RESOLVED | Noted: 2019-02-07 | Resolved: 2019-02-08

## 2019-02-08 PROBLEM — N17.9 AKI (ACUTE KIDNEY INJURY): Status: ACTIVE | Noted: 2019-02-08

## 2019-02-08 LAB
ANION GAP SERPL CALC-SCNC: 9 MMOL/L
ASCENDING AORTA: 3.03 CM
AV INDEX (PROSTH): 0.69
AV MEAN GRADIENT: 3.2 MMHG
AV PEAK GRADIENT: 6.15 MMHG
AV VALVE AREA: 3.09 CM2
AV VELOCITY RATIO: 0.65
BSA FOR ECHO PROCEDURE: 2.35 M2
BUN SERPL-MCNC: 35 MG/DL
CALCIUM SERPL-MCNC: 9.6 MG/DL
CHLORIDE SERPL-SCNC: 106 MMOL/L
CO2 SERPL-SCNC: 26 MMOL/L
CREAT SERPL-MCNC: 2.2 MG/DL
CV ECHO LV RWT: 0.32 CM
DOP CALC AO PEAK VEL: 1.24 M/S
DOP CALC AO VTI: 18.78 CM
DOP CALC LVOT AREA: 4.48 CM2
DOP CALC LVOT DIAMETER: 2.39 CM
DOP CALC LVOT PEAK VEL: 0.8 M/S
DOP CALC LVOT STROKE VOLUME: 57.98 CM3
DOP CALCLVOT PEAK VEL VTI: 12.93 CM
ECHO LV POSTERIOR WALL: 0.91 CM (ref 0.6–1.1)
EST. GFR  (AFRICAN AMERICAN): 28.1 ML/MIN/1.73 M^2
EST. GFR  (NON AFRICAN AMERICAN): 24.3 ML/MIN/1.73 M^2
FRACTIONAL SHORTENING: 31 % (ref 28–44)
GLUCOSE SERPL-MCNC: 128 MG/DL
INTERVENTRICULAR SEPTUM: 0.89 CM (ref 0.6–1.1)
IVRT: 0.13 MSEC
LA MAJOR: 6.14 CM
LA MINOR: 6.17 CM
LA WIDTH: 4.26 CM
LEFT ATRIUM SIZE: 4.02 CM
LEFT ATRIUM VOLUME INDEX: 39.3 ML/M2
LEFT ATRIUM VOLUME: 89.59 CM3
LEFT INTERNAL DIMENSION IN SYSTOLE: 3.9 CM (ref 2.1–4)
LEFT VENTRICLE DIASTOLIC VOLUME INDEX: 69.59 ML/M2
LEFT VENTRICLE DIASTOLIC VOLUME: 158.61 ML
LEFT VENTRICLE MASS INDEX: 86.1 G/M2
LEFT VENTRICLE SYSTOLIC VOLUME INDEX: 28.9 ML/M2
LEFT VENTRICLE SYSTOLIC VOLUME: 65.97 ML
LEFT VENTRICULAR INTERNAL DIMENSION IN DIASTOLE: 5.68 CM (ref 3.5–6)
LEFT VENTRICULAR MASS: 196.34 G
MAGNESIUM SERPL-MCNC: 1.7 MG/DL
PHOSPHATE SERPL-MCNC: 4.1 MG/DL
PISA TR MAX VEL: 2.67 M/S
POCT GLUCOSE: 104 MG/DL (ref 70–110)
POCT GLUCOSE: 109 MG/DL (ref 70–110)
POTASSIUM SERPL-SCNC: 3.8 MMOL/L
PULM VEIN S/D RATIO: 1.7
PV PEAK D VEL: 0.4 M/S
PV PEAK S VEL: 0.68 M/S
RA MAJOR: 5.27 CM
RA PRESSURE: 3 MMHG
RA WIDTH: 4.04 CM
RIGHT VENTRICULAR END-DIASTOLIC DIMENSION: 3.79 CM
RV TISSUE DOPPLER FREE WALL SYSTOLIC VELOCITY 1 (APICAL 4 CHAMBER VIEW): 14.35 M/S
SINUS: 3.02 CM
SODIUM SERPL-SCNC: 141 MMOL/L
STJ: 2.58 CM
TDI LATERAL: 0.06
TDI SEPTAL: 0.07
TDI: 0.07
TR MAX PG: 28.52 MMHG
TRICUSPID ANNULAR PLANE SYSTOLIC EXCURSION: 2.76 CM
TV REST PULMONARY ARTERY PRESSURE: 32 MMHG

## 2019-02-08 PROCEDURE — 84100 ASSAY OF PHOSPHORUS: CPT

## 2019-02-08 PROCEDURE — 80048 BASIC METABOLIC PNL TOTAL CA: CPT

## 2019-02-08 PROCEDURE — G0378 HOSPITAL OBSERVATION PER HR: HCPCS

## 2019-02-08 PROCEDURE — 25000003 PHARM REV CODE 250: Performed by: HOSPITALIST

## 2019-02-08 PROCEDURE — 11000001 HC ACUTE MED/SURG PRIVATE ROOM

## 2019-02-08 PROCEDURE — 83735 ASSAY OF MAGNESIUM: CPT

## 2019-02-08 PROCEDURE — 36415 COLL VENOUS BLD VENIPUNCTURE: CPT

## 2019-02-08 PROCEDURE — A4216 STERILE WATER/SALINE, 10 ML: HCPCS | Performed by: HOSPITALIST

## 2019-02-08 PROCEDURE — 99217 PR OBSERVATION CARE DISCHARGE: CPT | Mod: ,,, | Performed by: HOSPITALIST

## 2019-02-08 PROCEDURE — 63600175 PHARM REV CODE 636 W HCPCS: Performed by: HOSPITALIST

## 2019-02-08 PROCEDURE — 99217 PR OBSERVATION CARE DISCHARGE: ICD-10-PCS | Mod: ,,, | Performed by: HOSPITALIST

## 2019-02-08 RX ORDER — ASPIRIN 81 MG/1
81 TABLET ORAL DAILY
Qty: 30 TABLET | Refills: 0 | Status: SHIPPED | OUTPATIENT
Start: 2019-02-09 | End: 2019-07-31

## 2019-02-08 RX ORDER — FUROSEMIDE 40 MG/1
40 TABLET ORAL 2 TIMES DAILY
Qty: 60 TABLET | Refills: 1 | Status: ON HOLD | OUTPATIENT
Start: 2019-02-08 | End: 2019-05-29

## 2019-02-08 RX ADMIN — ESCITALOPRAM OXALATE 10 MG: 10 TABLET ORAL at 10:02

## 2019-02-08 RX ADMIN — LISINOPRIL 20 MG: 20 TABLET ORAL at 10:02

## 2019-02-08 RX ADMIN — Medication 3 ML: at 01:02

## 2019-02-08 RX ADMIN — AMLODIPINE BESYLATE 10 MG: 10 TABLET ORAL at 10:02

## 2019-02-08 RX ADMIN — PANTOPRAZOLE SODIUM 40 MG: 40 TABLET, DELAYED RELEASE ORAL at 10:02

## 2019-02-08 RX ADMIN — GABAPENTIN 100 MG: 100 CAPSULE ORAL at 10:02

## 2019-02-08 RX ADMIN — ASPIRIN 81 MG: 81 TABLET, COATED ORAL at 10:02

## 2019-02-08 RX ADMIN — ATORVASTATIN CALCIUM 10 MG: 10 TABLET, FILM COATED ORAL at 10:02

## 2019-02-08 RX ADMIN — LEVOTHYROXINE SODIUM 200 MCG: 100 TABLET ORAL at 10:02

## 2019-02-08 RX ADMIN — CARVEDILOL 12.5 MG: 12.5 TABLET, FILM COATED ORAL at 10:02

## 2019-02-08 RX ADMIN — FUROSEMIDE 40 MG: 10 INJECTION, SOLUTION INTRAVENOUS at 10:02

## 2019-02-08 NOTE — NURSING
Reviewed discharge instructions/medications with patient & daughter.  Prescriptions sent electronically to pt pharmacy. Tele removed. IV removed. All belongings returned. Pt escorted via w/c to private vehicle.

## 2019-02-08 NOTE — PLAN OF CARE
Problem: Adult Inpatient Plan of Care  Goal: Plan of Care Review  Outcome: Ongoing (interventions implemented as appropriate)  poc reviewed with patient, v/s stable, no questions or concerns, call light within reach, will cont to monitor,

## 2019-02-08 NOTE — PLAN OF CARE
02/08/19 1352   Discharge Assessment   Assessment Type Discharge Planning Assessment   Confirmed/corrected address and phone number on facesheet? Yes   Assessment information obtained from? Patient   Communicated expected length of stay with patient/caregiver no   Prior to hospitilization cognitive status: Alert/Oriented   Prior to hospitalization functional status: Independent   Current cognitive status: Alert/Oriented   Current Functional Status: Independent   Facility Arrived From: home   Lives With alone   Able to Return to Prior Arrangements yes   Is patient able to care for self after discharge? Yes   Who are your caregiver(s) and their phone number(s)? Miles Holguin(son) [phone]   Patient's perception of discharge disposition admitted as an inpatient   Readmission Within the Last 30 Days no previous admission in last 30 days   Patient currently being followed by outpatient case management? No   Patient currently receives any other outside agency services? No   Equipment Currently Used at Home walker, rolling   Do you have any problems affording any of your prescribed medications? No   Is the patient taking medications as prescribed? yes   Does the patient have transportation home? Yes  (son)   Transportation Anticipated family or friend will provide   Does the patient receive services at the Coumadin Clinic? No   Discharge Plan A Home   DME Needed Upon Discharge  none   Patient/Family in Agreement with Plan yes   Karla marroquin [address]

## 2019-02-09 NOTE — DISCHARGE SUMMARY
Discharge Summary  Hospital Medicine    Attending Provider on Discharge: Porter Norton MD  Hospital Medicine Team: [facility] HOSP MED A  Date of Admission:  2/7/2019     Date of Discharge:  2/8/2019  Code status: Full Code    Active Hospital Problems    Diagnosis  POA    *ARUNA (acute kidney injury) [N17.9]  Yes    History of hepatitis C [Z86.19]  Yes     s/p succesful Rx w/ Harvoni  - SVR12 (cure) 2016      CKD stage G3a/A3, GFR 45-59 and albumin creatinine ratio >300 mg/g [N18.3]  Yes    Postoperative hypothyroidism [E89.0]  Yes    Type 2 diabetes mellitus with circulatory disorder, with long-term current use of insulin [E11.59, Z79.4]  Not Applicable    Essential hypertension [I10]  Yes    Mixed hyperlipidemia [E78.2]  Yes    Nephrotic range proteinuria [R80.9]  Yes      Resolved Hospital Problems    Diagnosis Date Resolved POA    CHF exacerbation [I50.9] 02/08/2019 Yes    ARUNA (acute kidney injury) [N17.9] 02/08/2019 Yes     Kathie Quezada is a 58 y.o. woman with a history of HFpEF (EF: 60% 1/19), CKD Stage G3a/A3 c/b Nephrotic Range Proteinuria, DM2 (A1c: 5.2%) not taking Insulin at home, HTN, HLD, Hypothyroidism (s/p Thyroidectomy), GERD, Hx of CVA (no residual deficits) and Hx HCV s/p Porsha (2015) with reported undetectable VL who presents to [facility] from Nephrology Clinic for progressively worsening SOB, IBRAHIM, and LE edema for the past month Admitted to Hospital Medicine for management of gross volume overload and presumed Heart Failure Exacerbation. She had excellent diuresis overnight with IV lasix with marked improvement in chest discomfort, LE edema, and SOB. Seen next day by Dr. Goldsmith who agreed patient ok for discharge home with new prescription for lasix and close follow up in Nephrology clinic.     Acute Exacerbation of HFpEF (EF: 60% 1/19)  Volume Overload  Atypical Chest Pain  Concern for Cardio-Renal Syndrome  - BNP mildly elevated - 131, CXR with mild pulmonary edema and slight  cardiomegaly. PE with + JVD and 2+ bilateral LE pitting edema. Chest discomfort likely related to volume overload and not ACS as Troponin's negative x 2 and EKG's reviewed with no ischemic changes  - Nitroglycerin SL PRN chest  discomfort  - Diuresed with Lasix 40 mg IV BID, net negative 5.6L during admission  - Cardiac, fluid restricted diet  - Repeated TTE with stable findings of preserved EF and indeterminate diastolic dysfunction  -  mg x1 > 81 mg daily, Atorvastatin 10 mg daily, Carvedilol 12.5 mg BID (halfed of home dose), Lisinopril 10 mg daily     ARUNA on CKD Stage G3a/A3   C/b Nephrotic Range Proteinuria  C/b Secondary Hyperparathyroidism  C/b Vitamin D Insufficiency  - Diuresed per above  - Previous extensive CKD workup indicates that HTN and DM2 etiologies of CKD (JB, cryoglobulins, SPEP/UPEP, ANCA's, and complement all wnl's)     DM2 (A1c: 5.2%) not taking home Insulin, without Hyperglycemia  - Received low dose SSI, not taking Insulin at home and most recent A1c at goal, though BG's better marker for control in this patientwith CKD     HTN  - Continued Amlodipine 10 mg daily, Carvedilol 12.5 mg BID (half of home dose), and Lisinopril 10 mg daily     HLD  - ASA 81 mg daily,  Atorvastatin 10 mg daily  - Lipid Panel utd     Hypothyroidism (s/p Thyroidectomy)  - Synthroid 200 mcg daily  - Recent TSH/FT4 wnl's     GERD  - Protonix 40 mg daily     Hx of CVA (no residual deficits)  - ASA 81 mg daily, Atorvastatin 10 mg dinora;y    Recent Labs   Lab 02/07/19  1040   WBC 11.16   HGB 11.9*   HCT 40.8        Recent Labs   Lab 02/04/19  0823 02/07/19  1040 02/08/19  0444    140 141   K 4.5 4.4 3.8    114* 106   CO2 21* 13* 26   BUN 56* 42* 35*   CREATININE 2.5* 2.2* 2.2*   * 125* 128*   CALCIUM 8.9 9.4 9.6   MG  --   --  1.7   PHOS  --   --  4.1     Recent Labs   Lab 02/07/19  1040   ALKPHOS 91   ALT 13   AST 18   ALBUMIN 3.8   PROT 8.4   BILITOT 0.4      Recent Labs   Lab  "02/07/19  1741 02/07/19  1816 02/08/19  0836 02/08/19  1158   POCTGLUCOSE 80 99 104 109     Recent Labs     02/07/19  1428 02/07/19  1818   TROPONINI 0.017 <0.006       Discharge Medication List as of 2/8/2019  5:04 PM      START taking these medications    Details   aspirin (ECOTRIN) 81 MG EC tablet Take 1 tablet (81 mg total) by mouth once daily., Starting Sat 2/9/2019, Until Mon 3/11/2019, Normal      furosemide (LASIX) 40 MG tablet Take 1 tablet (40 mg total) by mouth 2 (two) times daily., Starting Fri 2/8/2019, Until Sun 3/10/2019, Normal         CONTINUE these medications which have NOT CHANGED    Details   amLODIPine (NORVASC) 10 MG tablet Take 1 tablet (10 mg total) by mouth once daily., Starting Fri 1/18/2019, Until Sat 1/18/2020, Normal      atorvastatin (LIPITOR) 10 MG tablet TK 1 T PO QHS, Historical Med      blood sugar diagnostic Strp To check BG 1 times daily, to use with insurance preferred meter, Normal      carvedilol (COREG) 25 MG tablet Take 25 mg by mouth 2 (two) times daily with meals. , Starting Fri 3/2/2018, Historical Med      escitalopram oxalate (LEXAPRO) 10 MG tablet Take 10 mg by mouth once daily., Historical Med      gabapentin (NEURONTIN) 100 MG capsule Take 1 capsule (100 mg total) by mouth 3 (three) times daily., Starting Thu 1/17/2019, Until Wed 4/17/2019, Normal      hydrALAZINE (APRESOLINE) 25 MG tablet Take 1 tablet daily for Systolic (top number) Blood Pressure greater than 160, Normal      lancets Misc To check BG 1 times daily, to use with insurance preferred meter, Normal      lisinopril (PRINIVIL,ZESTRIL) 40 MG tablet Take 20 mg by mouth once daily. , Starting Sun 1/21/2018, Historical Med      pantoprazole (PROTONIX) 40 MG tablet Take 1 tablet (40 mg total) by mouth once daily., Starting Fri 12/14/2018, Until Sat 12/14/2019, Normal      pen needle, diabetic (BD ULTRA-FINE OLGA LIDIA PEN NEEDLE) 32 gauge x 5/32" Ndle To use with Lantus solostar insulin pens, Normal      SYNTHROID " 200 mcg tablet Take 1 tablet (200 mcg total) by mouth once daily. BRAND NAME ONLY, Starting Mon 4/9/2018, Until Tue 4/9/2019, Normal      traZODone (DESYREL) 50 MG tablet Take 50 mg by mouth daily as needed., Starting Mon 2/26/2018, Historical Med             Discharge Diet:cardiac diet, diabetic diet: 1800 calorie and renal diet with Fluid restriction 1.5 per day    Activity: activity as tolerated    Discharge Condition: Good    Disposition: Home or Self Care     Time spent  on the discharge of the patient including review of hospital course with the patient. reviewing discharge medications and arranging follow-up care: >35 mins    Discharge examination Patient was seen and examined on the date of discharge and determined to be suitable for discharge.    Discharge plan and follow up:    Future Appointments   Date Time Provider Department Center   2/21/2019  9:30 AM Leonard Goldsmith MD [facility]   3/1/2019 11:00 AM WHITNEY Lombardo [facility] Cj D[facility] W         Porter Norton MD  Hospital Medicine Staff  [facility]   Pager: [phone]

## 2019-02-11 ENCOUNTER — TELEPHONE (OUTPATIENT)
Dept: HEPATOLOGY | Facility: CLINIC | Age: 59
End: 2019-02-11

## 2019-02-11 ENCOUNTER — TELEPHONE (OUTPATIENT)
Dept: INTERNAL MEDICINE | Facility: CLINIC | Age: 59
End: 2019-02-11

## 2019-02-11 DIAGNOSIS — R93.2 ABNORMAL MAGNETIC RESONANCE IMAGING OF LIVER: Primary | ICD-10-CM

## 2019-02-12 NOTE — TELEPHONE ENCOUNTER
pls call pt   We were never able to get the MRI from 12/2018 b/c of her kidney numbers.  Looking at recent labs, we still can not get MRI.  Tell her I recommend she have one more U/S for now.    pls schedule

## 2019-02-14 ENCOUNTER — TELEPHONE (OUTPATIENT)
Dept: HEPATOLOGY | Facility: CLINIC | Age: 59
End: 2019-02-14

## 2019-02-14 ENCOUNTER — HOSPITAL ENCOUNTER (OUTPATIENT)
Dept: RADIOLOGY | Facility: HOSPITAL | Age: 59
Discharge: HOME | End: 2019-02-14
Attending: PHYSICIAN ASSISTANT
Payer: MEDICARE

## 2019-02-14 DIAGNOSIS — K76.9 LIVER LESION: Primary | ICD-10-CM

## 2019-02-14 DIAGNOSIS — R93.2 ABNORMAL MAGNETIC RESONANCE IMAGING OF LIVER: ICD-10-CM

## 2019-02-14 PROCEDURE — 76705 US ABDOMEN LIMITED: ICD-10-PCS | Mod: 26,,, | Performed by: RADIOLOGY

## 2019-02-14 PROCEDURE — 76705 ECHO EXAM OF ABDOMEN: CPT | Mod: TC

## 2019-02-14 PROCEDURE — 76705 ECHO EXAM OF ABDOMEN: CPT | Mod: 26,,, | Performed by: RADIOLOGY

## 2019-02-14 NOTE — PROGRESS NOTES
"[facility]  Follow-up Visit Progress Note     PRESENTING HISTORY     PCP: Elan Price MD  Chief Complaint/Reason for Visit:  Follow up from recent visit.      No chief complaint on file.      History of Present Illness & ROS: Ms. Kathie Quezada is a 59 y.o. female.  Ms Vázquez is here today for a hospital follow up.   Readmitted on 2/7 with ARUNA and Vol overload.   Since most recent discharge, reports that "wathcing what eat,  Avoiding the salt and taking all medications" as prescribed.   She continues to reside alone, but has involvement and support from her children.   Does not endorse fever, chills, coughing, sob or chest discomforts (w/ activity or at rest).         Review of Systems:  Eyes: denies visual changes at this time denies floaters   ENT: no nasal congestion or sore throat  Respiratory: no cough or shorness of breath  Cardiovascular: no chest pain or palpitations  Gastrointestinal: no nausea or vomiting, no abdominal pain or change in bowel habits  Genitourinary: no hematuria or dysuria; denies frequency  Hematologic/Lymphatic: no easy bruising or lymphadenopathy  Musculoskeletal: no arthralgias or myalgias  Neurological: no seizures or tremors  Endocrine: no heat or cold intolerance      PAST HISTORY:     Past Medical History:   Diagnosis Date    CHF (congestive heart failure)     CKD stage G3a/A3, GFR 45-59 and albumin creatinine ratio >300 mg/g 5/16/2018    CVA (cerebral vascular accident) 2017    Essential hypertension 4/9/2018    Factor XI deficiency 1978    GIB (gastrointestinal bleeding) 10/18/2018    History of hepatitis C     s/p succesful Rx w/ Harvoni by Dr Herbert  - SVR12 (cure) 2016    Hyperlipidemia     Mixed hyperlipidemia 4/9/2018    Postoperative hypothyroidism     Proteinuria 4/9/2018    Type 2 diabetes mellitus with circulatory disorder, with long-term current use of insulin 4/9/2018       Past Surgical History:   Procedure Laterality Date    BACK SURGERY  "     COLONOSCOPY  11/13/2015    Dr Chino Herbert - brian prep. internal hemorrhoids. diverticulosis of sigmois. NO polyps. repeat due 2025    galbladder      HYSTERECTOMY      OOPHORECTOMY      one    TONSILLECTOMY         Family History   Problem Relation Age of Onset    Diabetes type II Mother        Social History     Socioeconomic History    Marital status:      Spouse name: Not on file    Number of children: Not on file    Years of education: Not on file    Highest education level: Not on file   Social Needs    Financial resource strain: Not on file    Food insecurity - worry: Not on file    Food insecurity - inability: Not on file    Transportation needs - medical: Not on file    Transportation needs - non-medical: Not on file   Occupational History    Not on file   Tobacco Use    Smoking status: Never Smoker    Smokeless tobacco: Never Used   Substance and Sexual Activity    Alcohol use: Yes     Alcohol/week: 2.4 oz     Types: 4 Cans of beer per week     Comment: weekly    Drug use: No    Sexual activity: Not on file   Other Topics Concern    Not on file   Social History Narrative    Not on file       MEDICATIONS & ALLERGIES:     Current Outpatient Medications on File Prior to Visit   Medication Sig Dispense Refill    amLODIPine (NORVASC) 10 MG tablet Take 1 tablet (10 mg total) by mouth once daily. 30 tablet 11    aspirin (ECOTRIN) 81 MG EC tablet Take 1 tablet (81 mg total) by mouth once daily. 30 tablet 0    atorvastatin (LIPITOR) 10 MG tablet TK 1 T PO QHS  1    blood sugar diagnostic Strp To check BG 1 times daily, to use with insurance preferred meter 35 strip 11    carvedilol (COREG) 25 MG tablet Take 25 mg by mouth 2 (two) times daily with meals.       escitalopram oxalate (LEXAPRO) 10 MG tablet Take 10 mg by mouth once daily.      furosemide (LASIX) 40 MG tablet Take 1 tablet (40 mg total) by mouth 2 (two) times daily. 60 tablet 1    gabapentin (NEURONTIN) 100 MG  "capsule Take 1 capsule (100 mg total) by mouth 3 (three) times daily. (Patient taking differently: Take 100 mg by mouth once daily. ) 90 capsule 3    hydrALAZINE (APRESOLINE) 25 MG tablet Take 1 tablet daily for Systolic (top number) Blood Pressure greater than 160 30 tablet 0    lancets Misc To check BG 1 times daily, to use with insurance preferred meter 50 each 11    lisinopril (PRINIVIL,ZESTRIL) 40 MG tablet Take 20 mg by mouth once daily.       pantoprazole (PROTONIX) 40 MG tablet Take 1 tablet (40 mg total) by mouth once daily. 90 tablet 3    pen needle, diabetic (BD ULTRA-FINE OLGA LIDIA PEN NEEDLE) 32 gauge x 5/32" Ndle To use with Lantus solostar insulin pens 100 each 3    SYNTHROID 200 mcg tablet Take 1 tablet (200 mcg total) by mouth once daily. BRAND NAME ONLY 30 tablet 11    traZODone (DESYREL) 50 MG tablet Take 50 mg by mouth daily as needed.  0     No current facility-administered medications on file prior to visit.         Review of patient's allergies indicates:   Allergen Reactions    Bactrim [sulfamethoxazole-trimethoprim] Hives    Codeine Hives    Penicillins Hives    Shellfish containing products Itching    Sulfa (sulfonamide antibiotics) Hives       Medications Reconciliation:   I have reconciled the patient's home medications and discharge medications with the patient/family. I have updated all changes.  Refer to After-Visit Medication List.    OBJECTIVE:     Vital Signs:  There were no vitals filed for this visit.  Wt Readings from Last 1 Encounters:   02/08/19 0651 111.6 kg (246 lb)   02/07/19 1808 111.7 kg (246 lb 5.8 oz)   02/07/19 0923 113.4 kg (250 lb)     There is no height or weight on file to calculate BMI.       Wt Readings from Last 3 Encounters:   02/19/19 109 kg (240 lb 4.8 oz)   02/08/19 111.6 kg (246 lb)   02/07/19 115.9 kg (255 lb 8.2 oz)     Temp Readings from Last 3 Encounters:   02/08/19 98.4 °F (36.9 °C) (Axillary)   01/17/19 98 °F (36.7 °C)   07/13/18 97.6 °F (36.4 " °C) (Oral)     BP Readings from Last 3 Encounters:   02/19/19 102/62   02/08/19 135/87   02/07/19 (!) 140/80     Pulse Readings from Last 3 Encounters:   02/19/19 77   02/08/19 78   02/07/19 72         Physical Exam:  General: Well developed, well nourished. No distress.  HEENT: Head is normocephalic, atraumatic; ears are normal.   Eyes: Clear conjunctiva.  Neck: Supple, symmetrical neck; trachea midline. No JVD  Lungs: Clear to auscultation bilaterally and normal respiratory effort.  Cardiovascular: Heart with regular rate and rhythm. No murmurs, gallops or rubs  Extremities: Minimal BLE edema. Pulses 2+ and symmetric.   Abdomen: Abdomen is soft, non-tender non-distended with normal bowel sounds.  Skin: Skin color, texture, turgor normal. No rashes.  Musculoskeletal: Normal gait.   Lymph Nodes: No cervical or supraclavicular adenopathy.  Neurologic: No focal numbness or weakness.   Psychiatric: Not depressed.        Laboratory  Lab Results   Component Value Date    WBC 11.16 02/07/2019    HGB 11.9 (L) 02/07/2019    HCT 40.8 02/07/2019     02/07/2019    CHOL 213 (H) 10/18/2018    TRIG 109 10/18/2018    HDL 76 (H) 10/18/2018    ALT 13 02/07/2019    AST 18 02/07/2019     02/08/2019    K 3.8 02/08/2019     02/08/2019    CREATININE 2.2 (H) 02/08/2019    BUN 35 (H) 02/08/2019    CO2 26 02/08/2019    TSH 2.892 10/18/2018    INR 1.1 01/14/2019    HGBA1C 5.4 01/31/2019         ASSESSMENT & PLAN:     HIGH RISK CONDITION(S):      Recent admission for CardioRenal Syndrome / ARUNA in the setting of CKD III:   *BNP on admit: 131  *Baseline: 1.6-1.7  *Creat on admit: 2.2 and 2.2 at discharge  *Given IV Lasix during admission  *Discharged on Lasix 40/40 (discharged off the [place]); has had a 15 # wt loss in 13 days   *Follow up with Nephrology (apt:2/21)  ` check Renal Panel and BNP  today       Hypertension:   *Goal:  < 130/90  Today: 106/64 (asymptomatic)  Repeat per Provider today: 130/88  *Reports home BP  (monitoring): 130s/80s  ` continue Norvasc 10  ` continue Coreg 25/25  ` continue Lisinopril 20  *Lasix 40/40 (discharged off the [place])   *PRN Hydralazine        DM (II):   Most recent A1c: 5.0% (10/2018)  *Insulin stopped before discharge; advised to keep a log and will not resume unless 2 or more consecutive readings > 140   ` she understands that she needs to keep checking her Blood sugars, before meals, and bring log to all clinic apts. She understands that if her renal fxn should continue to improve, she may need to be commenced back on insulin therapy. For now, she is having hypoglycemia events, and with an A1c of 5%, it is more than reasonable to stop the insulin and monitor closely.          Hypothyroidism:         Lab Results   Component Value Date     TSH 2.892 10/18/2018   ` continue current dose of thyroid hormone replacement          [facility] Visit (Post Discharge Follow-up) Today:   - Our clinic physicians and nurses plan to follow the patient up for any medical issues in the [facility] for 30 days post discharge.    Future Appointments   Date Time Provider Department Center   2/21/2019  9:30 AM Leonard Goldsmith MD [facility] NEPHRO Cj D[facility]   3/1/2019 11:00 AM WHITNEY Lombardo [facility] IM Cj Dill PCW        Medication List           Accurate as of 2/19/19  2:01 PM. If you have any questions, ask your nurse or doctor.               CHANGE how you take these medications    gabapentin 100 MG capsule  Commonly known as:  NEURONTIN  Take 1 capsule (100 mg total) by mouth 3 (three) times daily.  What changed:  when to take this        CONTINUE taking these medications    amLODIPine 10 MG tablet  Commonly known as:  NORVASC  Take 1 tablet (10 mg total) by mouth once daily.     aspirin 81 MG EC tablet  Commonly known as:  ECOTRIN  Take 1 tablet (81 mg total) by mouth once daily.     atorvastatin 10 MG tablet  Commonly known as:  LIPITOR     blood sugar diagnostic Strp  To check BG 1  "times daily, to use with insurance preferred meter     carvedilol 25 MG tablet  Commonly known as:  COREG     escitalopram oxalate 10 MG tablet  Commonly known as:  LEXAPRO     furosemide 40 MG tablet  Commonly known as:  LASIX  Take 1 tablet (40 mg total) by mouth 2 (two) times daily.     hydrALAZINE 25 MG tablet  Commonly known as:  APRESOLINE  Take 1 tablet daily for Systolic (top number) Blood Pressure greater than 160     lancets Misc  To check BG 1 times daily, to use with insurance preferred meter     lisinopril 40 MG tablet  Commonly known as:  PRINIVIL,ZESTRIL     pantoprazole 40 MG tablet  Commonly known as:  PROTONIX  Take 1 tablet (40 mg total) by mouth once daily.     pen needle, diabetic 32 gauge x 5/32" Ndle  Commonly known as:  BD ULTRA-FINE OLGA LIDIA PEN NEEDLE  To use with Lantus solostar insulin pens     SYNTHROID 200 MCG tablet  Generic drug:  levothyroxine  Take 1 tablet (200 mcg total) by mouth once daily. BRAND NAME ONLY     traZODone 50 MG tablet  Commonly known as:  DESYREL            Signing Physician:  WHITNEY Jaquez  "

## 2019-02-15 NOTE — TELEPHONE ENCOUNTER
U/S 2/14 failed to identify lesion previously seen on MRI 2/4/16  Radiologist recommend U/S w/ contrast   pls schedule

## 2019-02-19 ENCOUNTER — OFFICE VISIT (OUTPATIENT)
Dept: PRIMARY CARE CLINIC | Facility: CLINIC | Age: 59
End: 2019-02-19
Payer: MEDICARE

## 2019-02-19 ENCOUNTER — TELEPHONE (OUTPATIENT)
Dept: INTERNAL MEDICINE | Facility: CLINIC | Age: 59
End: 2019-02-19

## 2019-02-19 ENCOUNTER — LAB VISIT (OUTPATIENT)
Dept: LAB | Facility: HOSPITAL | Age: 59
End: 2019-02-19
Payer: MEDICARE

## 2019-02-19 VITALS
HEART RATE: 77 BPM | DIASTOLIC BLOOD PRESSURE: 62 MMHG | WEIGHT: 240.31 LBS | HEIGHT: 62 IN | OXYGEN SATURATION: 95 % | BODY MASS INDEX: 44.22 KG/M2 | SYSTOLIC BLOOD PRESSURE: 102 MMHG

## 2019-02-19 DIAGNOSIS — E78.2 MIXED HYPERLIPIDEMIA: ICD-10-CM

## 2019-02-19 DIAGNOSIS — E87.70 HYPERVOLEMIA, UNSPECIFIED HYPERVOLEMIA TYPE: ICD-10-CM

## 2019-02-19 DIAGNOSIS — N17.9 AKI (ACUTE KIDNEY INJURY): ICD-10-CM

## 2019-02-19 DIAGNOSIS — N18.31 CKD STAGE G3A/A3, GFR 45-59 AND ALBUMIN CREATININE RATIO >300 MG/G: ICD-10-CM

## 2019-02-19 DIAGNOSIS — N17.9 AKI (ACUTE KIDNEY INJURY): Primary | ICD-10-CM

## 2019-02-19 DIAGNOSIS — I10 ESSENTIAL HYPERTENSION: ICD-10-CM

## 2019-02-19 DIAGNOSIS — Z09 HOSPITAL DISCHARGE FOLLOW-UP: ICD-10-CM

## 2019-02-19 LAB
ANION GAP SERPL CALC-SCNC: 10 MMOL/L
BNP SERPL-MCNC: 107 PG/ML
BUN SERPL-MCNC: 42 MG/DL
CALCIUM SERPL-MCNC: 9.3 MG/DL
CHLORIDE SERPL-SCNC: 109 MMOL/L
CO2 SERPL-SCNC: 22 MMOL/L
CREAT SERPL-MCNC: 2.3 MG/DL
EST. GFR  (AFRICAN AMERICAN): 26 ML/MIN/1.73 M^2
EST. GFR  (NON AFRICAN AMERICAN): 23 ML/MIN/1.73 M^2
GLUCOSE SERPL-MCNC: 94 MG/DL
POTASSIUM SERPL-SCNC: 4.2 MMOL/L
SODIUM SERPL-SCNC: 141 MMOL/L

## 2019-02-19 PROCEDURE — 99999 PR PBB SHADOW E&M-EST. PATIENT-LVL IV: CPT | Mod: PBBFAC,,, | Performed by: NURSE PRACTITIONER

## 2019-02-19 PROCEDURE — 36415 COLL VENOUS BLD VENIPUNCTURE: CPT

## 2019-02-19 PROCEDURE — 80048 BASIC METABOLIC PNL TOTAL CA: CPT

## 2019-02-19 PROCEDURE — 99214 PR OFFICE/OUTPT VISIT, EST, LEVL IV, 30-39 MIN: ICD-10-PCS | Mod: S$PBB,,, | Performed by: NURSE PRACTITIONER

## 2019-02-19 PROCEDURE — 83880 ASSAY OF NATRIURETIC PEPTIDE: CPT

## 2019-02-19 PROCEDURE — 99214 OFFICE O/P EST MOD 30 MIN: CPT | Mod: PBBFAC | Performed by: NURSE PRACTITIONER

## 2019-02-19 PROCEDURE — 99999 PR PBB SHADOW E&M-EST. PATIENT-LVL IV: ICD-10-PCS | Mod: PBBFAC,,, | Performed by: NURSE PRACTITIONER

## 2019-02-19 PROCEDURE — 99214 OFFICE O/P EST MOD 30 MIN: CPT | Mod: S$PBB,,, | Performed by: NURSE PRACTITIONER

## 2019-02-19 NOTE — TELEPHONE ENCOUNTER
BMP  Lab Results   Component Value Date     02/19/2019    K 4.2 02/19/2019     02/19/2019    CO2 22 (L) 02/19/2019    BUN 42 (H) 02/19/2019    CREATININE 2.3 (H) (2.2) 02/19/2019    CALCIUM 9.3 02/19/2019    ANIONGAP 10 02/19/2019    ESTGFRAFRICA 26 (A) 02/19/2019    EGFRNONAA 23 (A) 02/19/2019     BNP: 107      Plan:   No changes  Follow up with Nephrology on 2/21 as scheduled.       Results have been shared with the patient via the portal    SDJ

## 2019-02-21 ENCOUNTER — OFFICE VISIT (OUTPATIENT)
Dept: NEPHROLOGY | Facility: CLINIC | Age: 59
End: 2019-02-21
Payer: MEDICARE

## 2019-02-21 VITALS
BODY MASS INDEX: 45.84 KG/M2 | HEIGHT: 62 IN | SYSTOLIC BLOOD PRESSURE: 120 MMHG | WEIGHT: 249.13 LBS | DIASTOLIC BLOOD PRESSURE: 80 MMHG | HEART RATE: 73 BPM | OXYGEN SATURATION: 97 %

## 2019-02-21 DIAGNOSIS — Z86.19 HISTORY OF HEPATITIS C: ICD-10-CM

## 2019-02-21 DIAGNOSIS — R80.9 PROTEINURIA, UNSPECIFIED TYPE: Primary | ICD-10-CM

## 2019-02-21 DIAGNOSIS — R80.9 NEPHROTIC RANGE PROTEINURIA: ICD-10-CM

## 2019-02-21 DIAGNOSIS — E87.70 HYPERVOLEMIA, UNSPECIFIED HYPERVOLEMIA TYPE: ICD-10-CM

## 2019-02-21 DIAGNOSIS — N18.31 CKD STAGE G3A/A3, GFR 45-59 AND ALBUMIN CREATININE RATIO >300 MG/G: ICD-10-CM

## 2019-02-21 DIAGNOSIS — N17.9 AKI (ACUTE KIDNEY INJURY): ICD-10-CM

## 2019-02-21 PROCEDURE — 99999 PR PBB SHADOW E&M-EST. PATIENT-LVL III: ICD-10-PCS | Mod: PBBFAC,,, | Performed by: INTERNAL MEDICINE

## 2019-02-21 PROCEDURE — 99214 PR OFFICE/OUTPT VISIT, EST, LEVL IV, 30-39 MIN: ICD-10-PCS | Mod: S$PBB,,, | Performed by: INTERNAL MEDICINE

## 2019-02-21 PROCEDURE — 99213 OFFICE O/P EST LOW 20 MIN: CPT | Mod: PBBFAC | Performed by: INTERNAL MEDICINE

## 2019-02-21 PROCEDURE — 99214 OFFICE O/P EST MOD 30 MIN: CPT | Mod: S$PBB,,, | Performed by: INTERNAL MEDICINE

## 2019-02-21 PROCEDURE — 99999 PR PBB SHADOW E&M-EST. PATIENT-LVL III: CPT | Mod: PBBFAC,,, | Performed by: INTERNAL MEDICINE

## 2019-02-21 NOTE — PROGRESS NOTES
Subjective:       Patient ID: Kathie Quezada is a 59 y.o. Black or  female who presents for f/u evaluation of ARUNA on CKD  HPI   Ms. Quezada is a 59 yo AAF with HTN, T2DM, and +HCV Ab who was referred to nephrology by her endocrinologist for evaluation of proteinuria and decreased eGFR. She has recently transferred her care to [facility] from [facility]. Chart review reveals only two data points: sCr 0.8 in 2015 and sCr 1.3 (with eGFR 53) in 4/2018. Similarly, ACR was 47.8 in 2006 and most recently 2788 on 4/2018.     She was diagnosed with T2DM prior to 2005. She has been on insulin since ~2008. Diabetes has been decently controlled with highest A1c in the 7-8s. Most recent A1c was 5.2%. Last eye exam on 5/2/18 with bilateral diabetic retinopathy. She was initially diagnosed with this in 2016.   She was diagnosed with HTN in the 1980s. BP has been controlled on-and-off; reports a h/o dietary non-compliance which is no improved. +h/o hospitalization for HTN emergency; + h/o CVA.   She has a h/o HCV Ab+. She completed Harvoni treatment in 2015.   She denies personal h/o kidney stones, recurrent UTIs, or significant NSAID use. She denies a family h/o kidney disease or ESRD.     Interval Hx  Comes in today for f/u evaluation. Recently admitted to hospital for evaluation of SOB and fluid overload. Started on IV lasix and resumed lasix 40mg PO BID and reports improvement in symptoms.  Creatinine 2.3, stable but up from baseline 1.7. No frequency or hematuria. Her appetite is not good and denies any nausea, vomiting, diarrhea, abdominal pain, melena or bright red bleeding per rectum. BP stable on current meds,  Denies NSAIDs.     Past Medical History:   Diagnosis Date    CHF (congestive heart failure)     CKD stage G3a/A3, GFR 45-59 and albumin creatinine ratio >300 mg/g 5/16/2018    CVA (cerebral vascular accident) 2017    Essential hypertension 4/9/2018    Factor XI deficiency 1978    GIB  (gastrointestinal bleeding) 10/18/2018    History of hepatitis C     s/p succesful Rx w/ Harvoni by Dr Herbert  - SVR12 (cure) 2016    Hyperlipidemia     Mixed hyperlipidemia 4/9/2018    Postoperative hypothyroidism     Proteinuria 4/9/2018    Type 2 diabetes mellitus with circulatory disorder, with long-term current use of insulin 4/9/2018     Past Surgical History:   Procedure Laterality Date    BACK SURGERY      COLONOSCOPY  11/13/2015    Dr Chino Herbert - brian prep. internal hemorrhoids. diverticulosis of sigmois. NO polyps. repeat due 2025    galbladder      HYSTERECTOMY      OOPHORECTOMY      one    TONSILLECTOMY       Family History   Problem Relation Age of Onset    Diabetes type II Mother      Social History     Socioeconomic History    Marital status:      Spouse name: Not on file    Number of children: Not on file    Years of education: Not on file    Highest education level: Not on file   Social Needs    Financial resource strain: Not on file    Food insecurity - worry: Not on file    Food insecurity - inability: Not on file    Transportation needs - medical: Not on file    Transportation needs - non-medical: Not on file   Occupational History    Not on file   Tobacco Use    Smoking status: Never Smoker    Smokeless tobacco: Never Used   Substance and Sexual Activity    Alcohol use: Yes     Alcohol/week: 2.4 oz     Types: 4 Cans of beer per week     Comment: weekly    Drug use: No    Sexual activity: Not on file   Other Topics Concern    Not on file   Social History Narrative    Not on file         Review of Systems    Const: no fevers or chills  Endo: stays cold all the time.   Eyes: +vision changes and blurry vision; holding off on laser surgery  HENT: no sore throat. +rhinorrhea and hoarseness ever since thyroid surgery  Heme: +easy bleeding/bruising 2/2 factor XI deficiency  CV: no chest pain. +BLE edema that started a few months ago  Resp: + SOB , dry cough,  "+orthopnea   GI: +diarrhea; no constipation  : No dysuria or hematuria.   Neuro: + neuropathy in bilateral feet; rarely in hands.   Skin: no new rashes or lesions; +dry skin      Objective:       Current Outpatient Medications:     amLODIPine (NORVASC) 10 MG tablet, Take 1 tablet (10 mg total) by mouth once daily., Disp: 30 tablet, Rfl: 11    aspirin (ECOTRIN) 81 MG EC tablet, Take 1 tablet (81 mg total) by mouth once daily., Disp: 30 tablet, Rfl: 0    atorvastatin (LIPITOR) 10 MG tablet, TK 1 T PO QHS, Disp: , Rfl: 1    blood sugar diagnostic Strp, To check BG 1 times daily, to use with insurance preferred meter, Disp: 35 strip, Rfl: 11    carvedilol (COREG) 25 MG tablet, Take 25 mg by mouth 2 (two) times daily with meals. , Disp: , Rfl:     escitalopram oxalate (LEXAPRO) 10 MG tablet, Take 10 mg by mouth once daily., Disp: , Rfl:     furosemide (LASIX) 40 MG tablet, Take 1 tablet (40 mg total) by mouth 2 (two) times daily., Disp: 60 tablet, Rfl: 1    gabapentin (NEURONTIN) 100 MG capsule, Take 1 capsule (100 mg total) by mouth 3 (three) times daily. (Patient taking differently: Take 100 mg by mouth once daily. ), Disp: 90 capsule, Rfl: 3    hydrALAZINE (APRESOLINE) 25 MG tablet, Take 1 tablet daily for Systolic (top number) Blood Pressure greater than 160, Disp: 30 tablet, Rfl: 0    lancets Misc, To check BG 1 times daily, to use with insurance preferred meter, Disp: 50 each, Rfl: 11    lisinopril (PRINIVIL,ZESTRIL) 40 MG tablet, Take 20 mg by mouth once daily. , Disp: , Rfl:     pantoprazole (PROTONIX) 40 MG tablet, Take 1 tablet (40 mg total) by mouth once daily., Disp: 90 tablet, Rfl: 3    pen needle, diabetic (BD ULTRA-FINE OLGA LIDIA PEN NEEDLE) 32 gauge x 5/32" Ndle, To use with Lantus solostar insulin pens, Disp: 100 each, Rfl: 3    SYNTHROID 200 mcg tablet, Take 1 tablet (200 mcg total) by mouth once daily. BRAND NAME ONLY, Disp: 30 tablet, Rfl: 11    traZODone (DESYREL) 50 MG tablet, Take 50 mg " by mouth daily as needed., Disp: , Rfl: 0    Physical Exam    There were no vitals taken for this visit.  General: WDWN AAF in NAD  Eyes: EOMI, clear conjunctivae  HENT: NC/AT. MMM.  Neck: supple, no thyromegaly  Lymph: no cervical or supraclavicular LAD  CV: RRR. BLE with 2+ pitting edema and chronic venous stasis changes  Resp: Decreased breath sounds. Dyspnea. Mild bibasilar crackles.   Abd: Soft, NTND  Skin: warm, normal turgor. No visible rash  Neuro: AAO. No focal deficits      Assessment:       No diagnosis found.    Plan:   1. ARUNA on CKD:   - baseline creatinine ~1.7, last creatinine 2.3, might represent progression of CKD. Will continue lasix and repeat UA and upc ratio.   - proteinuria present since at least 2006; nephrotic range.  - most likely related to diabetic nephropathy; especially given diabetic retinopathy. Cannot r/o contribution from HTN given significant history of HTN-related complications.   - HTN: goal BP < 130/80       Lab Results   Component Value Date    CREATININE 2.3 (H) 02/19/2019       Protein Creatinine Ratios: see above. Continue max dose ACEi. Educated patient about low salt diet. Will repeat.     Prot/Creat Ratio, Ur   Date Value Ref Range Status   08/23/2018 3.85 (H) 0.00 - 0.20 Final   05/16/2018 4.54 (H) 0.00 - 0.20 Final       Acid-Base: wnl.   Lab Results   Component Value Date     02/19/2019    K 4.2 02/19/2019    CO2 22 (L) 02/19/2019     2. HTN: Blood pressures stable    3. Renal osteodystrophy: Will re-evaluate upon progression of disease.   Lab Results   Component Value Date    .0 (H) 08/23/2018    CALCIUM 9.3 02/19/2019    PHOS 4.1 02/08/2019       4. Anemia: stable   Lab Results   Component Value Date    HGB 11.9 (L) 02/07/2019        5. DM:  Last HbA1C controlled. Continue current management.   Lab Results   Component Value Date    HGBA1C 5.4 01/31/2019       6. Lipid management: on statin   Lab Results   Component Value Date    LDLCALC 115.2 10/18/2018        RTC in 3 months with labs (RFP, CBC, ua, upc ratio, pth, vit D)

## 2019-02-22 ENCOUNTER — PATIENT MESSAGE (OUTPATIENT)
Dept: INTERNAL MEDICINE | Facility: CLINIC | Age: 59
End: 2019-02-22

## 2019-02-22 ENCOUNTER — RESEARCH ENCOUNTER (OUTPATIENT)
Dept: RESEARCH | Facility: HOSPITAL | Age: 59
End: 2019-02-22

## 2019-02-28 DIAGNOSIS — N17.9 AKI (ACUTE KIDNEY INJURY): ICD-10-CM

## 2019-02-28 RX ORDER — HYDRALAZINE HYDROCHLORIDE 25 MG/1
TABLET, FILM COATED ORAL
Qty: 30 TABLET | Refills: 5 | OUTPATIENT
Start: 2019-02-28

## 2019-03-04 ENCOUNTER — TELEPHONE (OUTPATIENT)
Dept: ENDOCRINOLOGY | Facility: CLINIC | Age: 59
End: 2019-03-04

## 2019-03-04 NOTE — TELEPHONE ENCOUNTER
----- Message from Emani Lozano sent at 3/4/2019 11:01 AM CST -----  Contact: [facility]  .Pharmacy Calling    Reason for call: Refill    Pharmacy Name: [facility] [id number]    Prescription Name: Vit D [id number]    Phone Number: [phone] Fax: [phone]    Additional Information:

## 2019-03-07 NOTE — TELEPHONE ENCOUNTER
Attempt made to reach patient.  WESLYM asking that she call us back with scheduling preferences.  I also spoke with Cayla in [place].  She states that she will have MD to review patient's records and she will call us back for scheduling.

## 2019-03-12 NOTE — TELEPHONE ENCOUNTER
Attempt made to reach Cayla again.  LVM asking that she give us a call back regarding scheduling of US with contrast.

## 2019-03-13 NOTE — TELEPHONE ENCOUNTER
Attempt made to reach Calya.  Left msg with staff asking that she give us a call back regarding US with contrast.

## 2019-03-14 ENCOUNTER — PATIENT MESSAGE (OUTPATIENT)
Dept: INTERNAL MEDICINE | Facility: CLINIC | Age: 59
End: 2019-03-14

## 2019-03-14 ENCOUNTER — TELEPHONE (OUTPATIENT)
Dept: HEPATOLOGY | Facility: CLINIC | Age: 59
End: 2019-03-14

## 2019-03-14 DIAGNOSIS — R93.2 ABNORMAL LIVER DIAGNOSTIC IMAGING: Primary | ICD-10-CM

## 2019-03-14 NOTE — TELEPHONE ENCOUNTER
Spoke to radiology resident  Since liver lesion not visible on u/s, not an option to do u/s w/ contrast  Unclear why this was recommended by radiology after last u/s    Per prior review w/ Dr Silverio, will get one more u/s in 6 months (8/2019). If this is negative, no additional imaging will be needed    pls schedule U/S and AFP, CMP 8/2019

## 2019-03-15 DIAGNOSIS — K76.9 LIVER LESION: Primary | ICD-10-CM

## 2019-03-15 NOTE — TELEPHONE ENCOUNTER
I spoke with patient and msg from PA Scheuermann relayed.  US and labs scheduled 8/12; appt notice mailed.

## 2019-05-06 ENCOUNTER — TELEPHONE (OUTPATIENT)
Dept: ENDOCRINOLOGY | Facility: CLINIC | Age: 59
End: 2019-05-06

## 2019-05-06 NOTE — TELEPHONE ENCOUNTER
----- Message from Antonella Felder sent at 5/6/2019  3:21 PM CDT -----  Contact: self  Pt is calling in regards to scheduling an appt for a checkup. Books aren't currently open to schedule this appt. Pt would like a call back to do so.    She can be reached at [phone].    Thank you

## 2019-05-13 RX ORDER — LEVOTHYROXINE SODIUM 200 UG/1
200 TABLET ORAL DAILY
Qty: 30 TABLET | Refills: 2 | Status: ON HOLD | OUTPATIENT
Start: 2019-05-13 | End: 2019-05-30 | Stop reason: SDUPTHER

## 2019-05-23 ENCOUNTER — HOSPITAL ENCOUNTER (INPATIENT)
Facility: HOSPITAL | Age: 59
LOS: 6 days | Discharge: NURSING, REHAB OR LONG-TERM CARE | DRG: 551 | End: 2019-05-30
Attending: EMERGENCY MEDICINE | Admitting: HOSPITALIST
Payer: MEDICARE

## 2019-05-23 DIAGNOSIS — R33.8 ACUTE URINARY RETENTION: Primary | ICD-10-CM

## 2019-05-23 DIAGNOSIS — J96.02 ACUTE RESPIRATORY FAILURE WITH HYPOXIA AND HYPERCAPNIA: ICD-10-CM

## 2019-05-23 DIAGNOSIS — E78.5 HYPERLIPIDEMIA, UNSPECIFIED HYPERLIPIDEMIA TYPE: ICD-10-CM

## 2019-05-23 DIAGNOSIS — M54.50 LOW BACK PAIN: ICD-10-CM

## 2019-05-23 DIAGNOSIS — I10 ESSENTIAL HYPERTENSION: ICD-10-CM

## 2019-05-23 DIAGNOSIS — N18.32 CKD STAGE G3B/A3, GFR 30-44 AND ALBUMIN CREATININE RATIO >300 MG/G: ICD-10-CM

## 2019-05-23 DIAGNOSIS — I82.409 DVT (DEEP VENOUS THROMBOSIS): ICD-10-CM

## 2019-05-23 DIAGNOSIS — S06.4XAA EPIDURAL HEMATOMA: ICD-10-CM

## 2019-05-23 DIAGNOSIS — R33.9 URINARY RETENTION: ICD-10-CM

## 2019-05-23 DIAGNOSIS — N17.9 AKI (ACUTE KIDNEY INJURY): ICD-10-CM

## 2019-05-23 DIAGNOSIS — R41.82 ALTERED MENTAL STATUS, UNSPECIFIED ALTERED MENTAL STATUS TYPE: ICD-10-CM

## 2019-05-23 DIAGNOSIS — R80.9 NEPHROTIC RANGE PROTEINURIA: ICD-10-CM

## 2019-05-23 DIAGNOSIS — D68.1 FACTOR XI DEFICIENCY: ICD-10-CM

## 2019-05-23 DIAGNOSIS — R41.82 ALTERED MENTAL STATE: ICD-10-CM

## 2019-05-23 DIAGNOSIS — Z78.9 IMPAIRED MOBILITY AND ADLS: ICD-10-CM

## 2019-05-23 DIAGNOSIS — R50.9 FEVER, UNSPECIFIED FEVER CAUSE: ICD-10-CM

## 2019-05-23 DIAGNOSIS — Z74.09 IMPAIRED MOBILITY AND ADLS: ICD-10-CM

## 2019-05-23 DIAGNOSIS — J96.01 ACUTE RESPIRATORY FAILURE WITH HYPOXIA AND HYPERCAPNIA: ICD-10-CM

## 2019-05-23 DIAGNOSIS — N17.8 ACUTE RENAL FAILURE WITH SPECIFIED LESION: ICD-10-CM

## 2019-05-23 DIAGNOSIS — S32.020A CLOSED COMPRESSION FRACTURE OF SECOND LUMBAR VERTEBRA, INITIAL ENCOUNTER: ICD-10-CM

## 2019-05-23 DIAGNOSIS — N17.0 ATN (ACUTE TUBULAR NECROSIS): ICD-10-CM

## 2019-05-23 DIAGNOSIS — E11.22 TYPE 2 DIABETES MELLITUS WITH STAGE 3 CHRONIC KIDNEY DISEASE, WITHOUT LONG-TERM CURRENT USE OF INSULIN: ICD-10-CM

## 2019-05-23 DIAGNOSIS — E89.0 POSTOPERATIVE HYPOTHYROIDISM: ICD-10-CM

## 2019-05-23 DIAGNOSIS — N18.30 TYPE 2 DIABETES MELLITUS WITH STAGE 3 CHRONIC KIDNEY DISEASE, WITHOUT LONG-TERM CURRENT USE OF INSULIN: ICD-10-CM

## 2019-05-23 PROCEDURE — 96374 THER/PROPH/DIAG INJ IV PUSH: CPT

## 2019-05-23 PROCEDURE — 99285 EMERGENCY DEPT VISIT HI MDM: CPT | Mod: ,,, | Performed by: PHYSICIAN ASSISTANT

## 2019-05-23 PROCEDURE — 96375 TX/PRO/DX INJ NEW DRUG ADDON: CPT

## 2019-05-23 PROCEDURE — 99285 EMERGENCY DEPT VISIT HI MDM: CPT | Mod: 25

## 2019-05-23 PROCEDURE — 85730 THROMBOPLASTIN TIME PARTIAL: CPT

## 2019-05-23 PROCEDURE — 86901 BLOOD TYPING SEROLOGIC RH(D): CPT

## 2019-05-23 PROCEDURE — 85610 PROTHROMBIN TIME: CPT

## 2019-05-23 PROCEDURE — 25000003 PHARM REV CODE 250: Performed by: PHYSICIAN ASSISTANT

## 2019-05-23 PROCEDURE — 99285 PR EMERGENCY DEPT VISIT,LEVEL V: ICD-10-PCS | Mod: ,,, | Performed by: PHYSICIAN ASSISTANT

## 2019-05-23 PROCEDURE — 63600175 PHARM REV CODE 636 W HCPCS: Performed by: PHYSICIAN ASSISTANT

## 2019-05-23 RX ORDER — ONDANSETRON 2 MG/ML
4 INJECTION INTRAMUSCULAR; INTRAVENOUS
Status: COMPLETED | OUTPATIENT
Start: 2019-05-23 | End: 2019-05-23

## 2019-05-23 RX ORDER — MORPHINE SULFATE 4 MG/ML
4 INJECTION, SOLUTION INTRAMUSCULAR; INTRAVENOUS
Status: COMPLETED | OUTPATIENT
Start: 2019-05-23 | End: 2019-05-23

## 2019-05-23 RX ORDER — HYDRALAZINE HYDROCHLORIDE 20 MG/ML
10 INJECTION INTRAMUSCULAR; INTRAVENOUS EVERY 6 HOURS PRN
Status: DISCONTINUED | OUTPATIENT
Start: 2019-05-24 | End: 2019-05-24

## 2019-05-23 RX ORDER — OXYCODONE HYDROCHLORIDE 5 MG/1
10 TABLET ORAL
Status: COMPLETED | OUTPATIENT
Start: 2019-05-23 | End: 2019-05-23

## 2019-05-23 RX ADMIN — MORPHINE SULFATE 4 MG: 4 INJECTION INTRAVENOUS at 09:05

## 2019-05-23 RX ADMIN — OXYCODONE HYDROCHLORIDE 10 MG: 5 TABLET ORAL at 09:05

## 2019-05-23 RX ADMIN — HYDRALAZINE HYDROCHLORIDE 10 MG: 20 INJECTION INTRAMUSCULAR; INTRAVENOUS at 11:05

## 2019-05-23 RX ADMIN — ONDANSETRON 4 MG: 2 INJECTION INTRAMUSCULAR; INTRAVENOUS at 09:05

## 2019-05-24 PROBLEM — E78.5 HYPERLIPIDEMIA: Status: ACTIVE | Noted: 2018-04-09

## 2019-05-24 PROBLEM — E87.6 HYPOKALEMIA: Status: ACTIVE | Noted: 2019-05-24

## 2019-05-24 PROBLEM — I50.30 (HFPEF) HEART FAILURE WITH PRESERVED EJECTION FRACTION: Status: ACTIVE | Noted: 2019-02-07

## 2019-05-24 PROBLEM — N18.32 CKD STAGE G3B/A3, GFR 30-44 AND ALBUMIN CREATININE RATIO >300 MG/G: Status: ACTIVE | Noted: 2018-05-16

## 2019-05-24 PROBLEM — E03.9 HYPOTHYROIDISM: Status: ACTIVE | Noted: 2019-05-24

## 2019-05-24 PROBLEM — E11.9 DM2 (DIABETES MELLITUS, TYPE 2): Status: ACTIVE | Noted: 2018-04-09

## 2019-05-24 PROBLEM — S32.020A CLOSED COMPRESSION FRACTURE OF L2 LUMBAR VERTEBRA: Status: ACTIVE | Noted: 2019-05-24

## 2019-05-24 PROBLEM — K21.9 GERD (GASTROESOPHAGEAL REFLUX DISEASE): Status: ACTIVE | Noted: 2019-05-24

## 2019-05-24 PROBLEM — R33.8 ACUTE URINARY RETENTION: Status: ACTIVE | Noted: 2019-05-24

## 2019-05-24 LAB
ABO + RH BLD: NORMAL
ALBUMIN SERPL BCP-MCNC: 2.9 G/DL (ref 3.5–5.2)
ALP SERPL-CCNC: 122 U/L (ref 55–135)
ALT SERPL W/O P-5'-P-CCNC: 28 U/L (ref 10–44)
ANION GAP SERPL CALC-SCNC: 11 MMOL/L (ref 8–16)
APTT BLDCRRT: 28.2 SEC (ref 21–32)
APTT BLDCRRT: 28.7 SEC (ref 21–32)
AST SERPL-CCNC: 61 U/L (ref 10–40)
BACTERIA #/AREA URNS AUTO: ABNORMAL /HPF
BASOPHILS # BLD AUTO: 0.05 K/UL (ref 0–0.2)
BASOPHILS NFR BLD: 0.4 % (ref 0–1.9)
BILIRUB SERPL-MCNC: 0.5 MG/DL (ref 0.1–1)
BILIRUB UR QL STRIP: NEGATIVE
BLD GP AB SCN CELLS X3 SERPL QL: NORMAL
BUN SERPL-MCNC: 22 MG/DL (ref 6–20)
CALCIUM SERPL-MCNC: 8.7 MG/DL (ref 8.7–10.5)
CHLORIDE SERPL-SCNC: 109 MMOL/L (ref 95–110)
CLARITY UR REFRACT.AUTO: CLEAR
CO2 SERPL-SCNC: 21 MMOL/L (ref 23–29)
COLOR UR AUTO: ABNORMAL
CREAT SERPL-MCNC: 1.7 MG/DL (ref 0.5–1.4)
DIFFERENTIAL METHOD: ABNORMAL
EOSINOPHIL # BLD AUTO: 0.1 K/UL (ref 0–0.5)
EOSINOPHIL NFR BLD: 1.1 % (ref 0–8)
ERYTHROCYTE [DISTWIDTH] IN BLOOD BY AUTOMATED COUNT: 14.6 % (ref 11.5–14.5)
EST. GFR  (AFRICAN AMERICAN): 38 ML/MIN/1.73 M^2
EST. GFR  (NON AFRICAN AMERICAN): 33 ML/MIN/1.73 M^2
GLUCOSE SERPL-MCNC: 80 MG/DL (ref 70–110)
GLUCOSE UR QL STRIP: ABNORMAL
HCT VFR BLD AUTO: 34.4 % (ref 37–48.5)
HGB BLD-MCNC: 11.2 G/DL (ref 12–16)
HGB UR QL STRIP: ABNORMAL
HYALINE CASTS UR QL AUTO: 0 /LPF
IMM GRANULOCYTES # BLD AUTO: 0.07 K/UL (ref 0–0.04)
IMM GRANULOCYTES NFR BLD AUTO: 0.6 % (ref 0–0.5)
INR PPP: 1.1 (ref 0.8–1.2)
INR PPP: 1.1 (ref 0.8–1.2)
KETONES UR QL STRIP: NEGATIVE
LEUKOCYTE ESTERASE UR QL STRIP: NEGATIVE
LYMPHOCYTES # BLD AUTO: 2.2 K/UL (ref 1–4.8)
LYMPHOCYTES NFR BLD: 17.2 % (ref 18–48)
MAGNESIUM SERPL-MCNC: 1.4 MG/DL (ref 1.6–2.6)
MCH RBC QN AUTO: 27.9 PG (ref 27–31)
MCHC RBC AUTO-ENTMCNC: 32.6 G/DL (ref 32–36)
MCV RBC AUTO: 86 FL (ref 82–98)
MICROSCOPIC COMMENT: ABNORMAL
MONOCYTES # BLD AUTO: 1.1 K/UL (ref 0.3–1)
MONOCYTES NFR BLD: 9 % (ref 4–15)
NEUTROPHILS # BLD AUTO: 9 K/UL (ref 1.8–7.7)
NEUTROPHILS NFR BLD: 71.7 % (ref 38–73)
NITRITE UR QL STRIP: NEGATIVE
NRBC BLD-RTO: 0 /100 WBC
PH UR STRIP: 6 [PH] (ref 5–8)
PHOSPHATE SERPL-MCNC: 3.7 MG/DL (ref 2.7–4.5)
PLATELET # BLD AUTO: 174 K/UL (ref 150–350)
PMV BLD AUTO: 10.6 FL (ref 9.2–12.9)
POTASSIUM SERPL-SCNC: 3.1 MMOL/L (ref 3.5–5.1)
PROT SERPL-MCNC: 7.1 G/DL (ref 6–8.4)
PROT UR QL STRIP: ABNORMAL
PROTHROMBIN TIME: 10.9 SEC (ref 9–12.5)
PROTHROMBIN TIME: 11 SEC (ref 9–12.5)
RBC # BLD AUTO: 4.02 M/UL (ref 4–5.4)
RBC #/AREA URNS AUTO: 3 /HPF (ref 0–4)
SODIUM SERPL-SCNC: 141 MMOL/L (ref 136–145)
SP GR UR STRIP: 1.01 (ref 1–1.03)
URN SPEC COLLECT METH UR: ABNORMAL
WBC # BLD AUTO: 12.51 K/UL (ref 3.9–12.7)
WBC #/AREA URNS AUTO: 0 /HPF (ref 0–5)

## 2019-05-24 PROCEDURE — 80053 COMPREHEN METABOLIC PANEL: CPT

## 2019-05-24 PROCEDURE — 63600175 PHARM REV CODE 636 W HCPCS: Performed by: PHYSICIAN ASSISTANT

## 2019-05-24 PROCEDURE — 25000003 PHARM REV CODE 250: Performed by: STUDENT IN AN ORGANIZED HEALTH CARE EDUCATION/TRAINING PROGRAM

## 2019-05-24 PROCEDURE — 25000003 PHARM REV CODE 250: Performed by: INTERNAL MEDICINE

## 2019-05-24 PROCEDURE — 96376 TX/PRO/DX INJ SAME DRUG ADON: CPT

## 2019-05-24 PROCEDURE — 63600175 PHARM REV CODE 636 W HCPCS: Performed by: INTERNAL MEDICINE

## 2019-05-24 PROCEDURE — 99223 1ST HOSP IP/OBS HIGH 75: CPT | Mod: AI,GC,, | Performed by: HOSPITALIST

## 2019-05-24 PROCEDURE — 63600175 PHARM REV CODE 636 W HCPCS: Performed by: STUDENT IN AN ORGANIZED HEALTH CARE EDUCATION/TRAINING PROGRAM

## 2019-05-24 PROCEDURE — 11000001 HC ACUTE MED/SURG PRIVATE ROOM

## 2019-05-24 PROCEDURE — 99223 PR INITIAL HOSPITAL CARE,LEVL III: ICD-10-PCS | Mod: AI,GC,, | Performed by: HOSPITALIST

## 2019-05-24 PROCEDURE — 96375 TX/PRO/DX INJ NEW DRUG ADDON: CPT

## 2019-05-24 PROCEDURE — 85610 PROTHROMBIN TIME: CPT

## 2019-05-24 PROCEDURE — 83735 ASSAY OF MAGNESIUM: CPT

## 2019-05-24 PROCEDURE — 81001 URINALYSIS AUTO W/SCOPE: CPT

## 2019-05-24 PROCEDURE — 85730 THROMBOPLASTIN TIME PARTIAL: CPT

## 2019-05-24 PROCEDURE — 84100 ASSAY OF PHOSPHORUS: CPT

## 2019-05-24 PROCEDURE — 85025 COMPLETE CBC W/AUTO DIFF WBC: CPT

## 2019-05-24 RX ORDER — ONDANSETRON 8 MG/1
8 TABLET, ORALLY DISINTEGRATING ORAL EVERY 6 HOURS PRN
Status: DISCONTINUED | OUTPATIENT
Start: 2019-05-24 | End: 2019-05-30 | Stop reason: HOSPADM

## 2019-05-24 RX ORDER — HEPARIN SODIUM 5000 [USP'U]/ML
5000 INJECTION, SOLUTION INTRAVENOUS; SUBCUTANEOUS EVERY 8 HOURS
Status: DISCONTINUED | OUTPATIENT
Start: 2019-05-24 | End: 2019-05-24

## 2019-05-24 RX ORDER — ATORVASTATIN CALCIUM 10 MG/1
10 TABLET, FILM COATED ORAL DAILY
Status: DISCONTINUED | OUTPATIENT
Start: 2019-05-25 | End: 2019-05-30 | Stop reason: HOSPADM

## 2019-05-24 RX ORDER — HYDROMORPHONE HYDROCHLORIDE 1 MG/ML
0.5 INJECTION, SOLUTION INTRAMUSCULAR; INTRAVENOUS; SUBCUTANEOUS EVERY 4 HOURS PRN
Status: DISCONTINUED | OUTPATIENT
Start: 2019-05-24 | End: 2019-05-26

## 2019-05-24 RX ORDER — TRAZODONE HYDROCHLORIDE 50 MG/1
50 TABLET ORAL NIGHTLY PRN
Status: DISCONTINUED | OUTPATIENT
Start: 2019-05-24 | End: 2019-05-26

## 2019-05-24 RX ORDER — POTASSIUM CHLORIDE 20 MEQ/1
40 TABLET, EXTENDED RELEASE ORAL
Status: COMPLETED | OUTPATIENT
Start: 2019-05-24 | End: 2019-05-24

## 2019-05-24 RX ORDER — POTASSIUM CHLORIDE 20 MEQ/1
40 TABLET, EXTENDED RELEASE ORAL
Status: DISCONTINUED | OUTPATIENT
Start: 2019-05-24 | End: 2019-05-24

## 2019-05-24 RX ORDER — LEVOTHYROXINE SODIUM 100 UG/1
200 TABLET ORAL DAILY
Status: DISCONTINUED | OUTPATIENT
Start: 2019-05-24 | End: 2019-05-28

## 2019-05-24 RX ORDER — OXYCODONE HYDROCHLORIDE 10 MG/1
10 TABLET ORAL EVERY 6 HOURS PRN
Status: DISCONTINUED | OUTPATIENT
Start: 2019-05-24 | End: 2019-05-25

## 2019-05-24 RX ORDER — LISINOPRIL 20 MG/1
20 TABLET ORAL DAILY
Status: DISCONTINUED | OUTPATIENT
Start: 2019-05-24 | End: 2019-05-26

## 2019-05-24 RX ORDER — MORPHINE SULFATE 4 MG/ML
4 INJECTION, SOLUTION INTRAMUSCULAR; INTRAVENOUS
Status: COMPLETED | OUTPATIENT
Start: 2019-05-24 | End: 2019-05-24

## 2019-05-24 RX ORDER — GABAPENTIN 100 MG/1
100 CAPSULE ORAL 3 TIMES DAILY
Status: DISCONTINUED | OUTPATIENT
Start: 2019-05-24 | End: 2019-05-25

## 2019-05-24 RX ORDER — INSULIN ASPART 100 [IU]/ML
0-5 INJECTION, SOLUTION INTRAVENOUS; SUBCUTANEOUS
Status: DISCONTINUED | OUTPATIENT
Start: 2019-05-24 | End: 2019-05-30 | Stop reason: HOSPADM

## 2019-05-24 RX ORDER — OXYCODONE HYDROCHLORIDE 5 MG/1
5 TABLET ORAL EVERY 6 HOURS PRN
Status: DISCONTINUED | OUTPATIENT
Start: 2019-05-24 | End: 2019-05-26

## 2019-05-24 RX ORDER — CARVEDILOL 25 MG/1
25 TABLET ORAL 2 TIMES DAILY WITH MEALS
Status: DISCONTINUED | OUTPATIENT
Start: 2019-05-24 | End: 2019-05-30 | Stop reason: HOSPADM

## 2019-05-24 RX ORDER — FUROSEMIDE 40 MG/1
40 TABLET ORAL 2 TIMES DAILY
Status: DISCONTINUED | OUTPATIENT
Start: 2019-05-24 | End: 2019-05-25

## 2019-05-24 RX ORDER — IBUPROFEN 200 MG
24 TABLET ORAL
Status: DISCONTINUED | OUTPATIENT
Start: 2019-05-24 | End: 2019-05-30 | Stop reason: HOSPADM

## 2019-05-24 RX ORDER — ACETAMINOPHEN 325 MG/1
650 TABLET ORAL EVERY 4 HOURS PRN
Status: DISCONTINUED | OUTPATIENT
Start: 2019-05-24 | End: 2019-05-29

## 2019-05-24 RX ORDER — PANTOPRAZOLE SODIUM 40 MG/1
40 TABLET, DELAYED RELEASE ORAL DAILY
Status: DISCONTINUED | OUTPATIENT
Start: 2019-05-24 | End: 2019-05-30 | Stop reason: HOSPADM

## 2019-05-24 RX ORDER — SODIUM CHLORIDE 0.9 % (FLUSH) 0.9 %
10 SYRINGE (ML) INJECTION
Status: DISCONTINUED | OUTPATIENT
Start: 2019-05-24 | End: 2019-05-30 | Stop reason: HOSPADM

## 2019-05-24 RX ORDER — ESCITALOPRAM OXALATE 10 MG/1
10 TABLET ORAL DAILY
Status: DISCONTINUED | OUTPATIENT
Start: 2019-05-24 | End: 2019-05-30 | Stop reason: HOSPADM

## 2019-05-24 RX ORDER — DIAZEPAM 5 MG/ML
2 INJECTION, SOLUTION INTRAMUSCULAR; INTRAVENOUS
Status: COMPLETED | OUTPATIENT
Start: 2019-05-24 | End: 2019-05-24

## 2019-05-24 RX ORDER — AMOXICILLIN 250 MG
1 CAPSULE ORAL 2 TIMES DAILY PRN
Status: DISCONTINUED | OUTPATIENT
Start: 2019-05-24 | End: 2019-05-30 | Stop reason: HOSPADM

## 2019-05-24 RX ORDER — GLUCAGON 1 MG
1 KIT INJECTION
Status: DISCONTINUED | OUTPATIENT
Start: 2019-05-24 | End: 2019-05-30 | Stop reason: HOSPADM

## 2019-05-24 RX ORDER — HYDROMORPHONE HYDROCHLORIDE 1 MG/ML
1 INJECTION, SOLUTION INTRAMUSCULAR; INTRAVENOUS; SUBCUTANEOUS EVERY 4 HOURS PRN
Status: DISCONTINUED | OUTPATIENT
Start: 2019-05-24 | End: 2019-05-24

## 2019-05-24 RX ORDER — POTASSIUM CHLORIDE 20 MEQ/1
20 TABLET, EXTENDED RELEASE ORAL DAILY
Status: DISCONTINUED | OUTPATIENT
Start: 2019-05-25 | End: 2019-05-27

## 2019-05-24 RX ORDER — AMLODIPINE BESYLATE 10 MG/1
10 TABLET ORAL DAILY
Status: DISCONTINUED | OUTPATIENT
Start: 2019-05-24 | End: 2019-05-30 | Stop reason: HOSPADM

## 2019-05-24 RX ORDER — IBUPROFEN 200 MG
16 TABLET ORAL
Status: DISCONTINUED | OUTPATIENT
Start: 2019-05-24 | End: 2019-05-30 | Stop reason: HOSPADM

## 2019-05-24 RX ADMIN — CARVEDILOL 25 MG: 25 TABLET, FILM COATED ORAL at 04:05

## 2019-05-24 RX ADMIN — POTASSIUM CHLORIDE 40 MEQ: 1500 TABLET, EXTENDED RELEASE ORAL at 12:05

## 2019-05-24 RX ADMIN — ESCITALOPRAM OXALATE 10 MG: 5 TABLET, FILM COATED ORAL at 10:05

## 2019-05-24 RX ADMIN — OXYCODONE HYDROCHLORIDE 10 MG: 10 TABLET ORAL at 08:05

## 2019-05-24 RX ADMIN — HYDROMORPHONE HYDROCHLORIDE 1 MG: 1 INJECTION, SOLUTION INTRAMUSCULAR; INTRAVENOUS; SUBCUTANEOUS at 10:05

## 2019-05-24 RX ADMIN — AMLODIPINE BESYLATE 10 MG: 10 TABLET ORAL at 10:05

## 2019-05-24 RX ADMIN — MORPHINE SULFATE 4 MG: 4 INJECTION INTRAVENOUS at 12:05

## 2019-05-24 RX ADMIN — LISINOPRIL 20 MG: 20 TABLET ORAL at 10:05

## 2019-05-24 RX ADMIN — FUROSEMIDE 40 MG: 40 TABLET ORAL at 10:05

## 2019-05-24 RX ADMIN — DIAZEPAM 2 MG: 5 INJECTION, SOLUTION INTRAMUSCULAR; INTRAVENOUS at 03:05

## 2019-05-24 RX ADMIN — HYDROMORPHONE HYDROCHLORIDE 1 MG: 1 INJECTION, SOLUTION INTRAMUSCULAR; INTRAVENOUS; SUBCUTANEOUS at 05:05

## 2019-05-24 RX ADMIN — LEVOTHYROXINE SODIUM 200 MCG: 100 TABLET ORAL at 10:05

## 2019-05-24 RX ADMIN — PANTOPRAZOLE SODIUM 40 MG: 40 TABLET, DELAYED RELEASE ORAL at 11:05

## 2019-05-24 RX ADMIN — POTASSIUM CHLORIDE 40 MEQ: 1500 TABLET, EXTENDED RELEASE ORAL at 08:05

## 2019-05-24 RX ADMIN — GABAPENTIN 100 MG: 100 CAPSULE ORAL at 02:05

## 2019-05-24 RX ADMIN — HYDRALAZINE HYDROCHLORIDE 10 MG: 20 INJECTION INTRAMUSCULAR; INTRAVENOUS at 09:05

## 2019-05-24 RX ADMIN — HYDROMORPHONE HYDROCHLORIDE 0.5 MG: 1 INJECTION, SOLUTION INTRAMUSCULAR; INTRAVENOUS; SUBCUTANEOUS at 04:05

## 2019-05-24 RX ADMIN — CARVEDILOL 25 MG: 25 TABLET, FILM COATED ORAL at 10:05

## 2019-05-24 RX ADMIN — FUROSEMIDE 40 MG: 40 TABLET ORAL at 08:05

## 2019-05-24 NOTE — CONSULTS
Consult Note  Neurosurgery    Admit Date: 5/23/2019  LOS: 0    Code Status: Prior     CC: <principal problem not specified>    SUBJECTIVE:     History of Present Illness: 60 yo female s/p mechanical fall now with concern for superior L2 compression fracture on plain films. Pt says she briefly experienced burning pains in both legs immediately after event which has since resolved.     On exam pt has tenderness to palpation in the mid lumbar region. No focal deficit. Pain limited weakness in lower extremities but full strength with effort.          OBJECTIVE:   Vital Signs (Most Recent):   Temp: 98.4 °F (36.9 °C) (05/23/19 2237)  Pulse: 96 (05/24/19 0032)  Resp: 18 (05/23/19 2237)  BP: (!) 171/78 (05/24/19 0032)  SpO2: (!) 94 % (05/24/19 0032)    Vital Signs (24h Range):   Temp:  [98.4 °F (36.9 °C)-98.6 °F (37 °C)] 98.4 °F (36.9 °C)  Pulse:  [91-97] 96  Resp:  [18] 18  SpO2:  [94 %-98 %] 94 %  BP: (171-186)/() 171/78      I & O (Last 24h):  No intake or output data in the 24 hours ending 05/24/19 0045    Physical Exam:  General: well developed, well nourished, no distress.   Head: normocephalic, atraumatic  Cervical Spine: No midline tenderness to palpation.  Thoracolumbosacral Spine: Midline tenderness to palpation midlumbar region.  GCS: Motor: 6/Verbal: 5/Eyes: 4 GCS Total: 15  Mental Status: Awake, Alert, Oriented x 4  Language: No aphasia  Speech: No dysarthria  Facial Droop: None   Cranial nerves: CN III-XII grossly intact.  Visual Fields: Intact.   Eyes: Pupils equal and reactive to light. Intact Conjugate horizontal and vertical pursuit. No nystagmus. No gaze deviation.   Pulmonary: No distress.  Sensory: No deficit.  Propioception: No deficit in 1st digit of toe bilaterally.  Rectal Tone: Not tested.  Drift: None.  Upper Extremity Ataxia: No Dysmetria Bilaterally  Lower Extremity Ataxia: Not tested.  Dysdiadochokinesia: Not tested.  Reflexes: 2+ patellar bilaterally.  Islas: Absent  Clonus:  Absent  Babinski: Absent  Romberg: Not Tested  Pulses: Brisk and symmetric radial, DP and tibial pulses.  Motor Strength:    Strength  Shoulder Abduction Elbow Extension Elbow Flexion Wrist Extension Wrist Flexion Finger Opposition Finger Add Finger Abd   Upper: R 5/5 5/5 5/5 5/5 5/5 5/5 5/5 5/5    L 5/5 5/5 5/5 5/5 5/5 5/5 5/5 5/5     Hip Flexion Knee Extension Knee  Flexion Ankle Dflexion Ankle Pflexion EHL     Lower: R 5/5 5/5 5/5 5/5 5/5 5/5      L 5/5 5/5 5/5 5/5 5/5 5/5               Lines/Drains/Airway:          Nutrition/Tube Feeds:   Current Diet Order   No orders of the defined types were placed in this encounter.       Labs:  ABG: No results for input(s): PH, PO2, PCO2, HCO3, POCSATURATED, BE in the last 24 hours.  BMP:No results for input(s): NA, K, CL, CO2, BUN, CREATININE, GLU, MG, PHOS in the last 24 hours.  LFT:   Lab Results   Component Value Date    AST 18 02/07/2019    ALT 13 02/07/2019    ALKPHOS 91 02/07/2019    BILITOT 0.4 02/07/2019    ALBUMIN 3.8 02/07/2019    PROT 8.4 02/07/2019     CBC:   Lab Results   Component Value Date    WBC 11.16 02/07/2019    HGB 11.9 (L) 02/07/2019    HCT 40.8 02/07/2019    MCV 92 02/07/2019     02/07/2019     Microbiology x 7d:   Microbiology Results (last 7 days)     ** No results found for the last 168 hours. **            ASSESSMENT/PLAN:   58 yo female s/p mechanical fall with superior L2 compression fracture. Neurlogically stable on exam.    --No acute neurosurgical intervention required.  --Please place and maintain LSO brace when out of bed more than 5 minutes.  --Will obtain MRI lumbar spine w/o tamera.  --Further recommendations pending completion of above.  --We will continue to monitor closely, please contact us with any questions or concerns.    Joce Martin    Pt now with small bilateral EDH on MRI adjacent to pedicles at L2 with mild thecal sac compression. Pt also with urinary retention and mera placed. No other symptom changes. Given extensive  history of CKD, CHF, HCV among others, recommend medicine evaluation for admission during workup for hematoma in context of acute fracture. Degree of canal compression not typical for development of acute severe polyradiculopathy.    --No acute neurosurgical intervention.  --Recommend hospital medicine evaluation for admission.  --Please place and maintain TLSO brace at all times.  --Please keep pt on logroll precautions until TLSO brace placed.  --Will obtain CT lumbar spine to further evaluate fracture.  --We will continue to monitor closely, please contact us with any questions or concerns.    Update 5/24/19 1022:    Interval CT lumbar spine shows no involvement of posterior column. Stable superior endplate compression fracture. Urinary retention likely related to pain rather, extremely low suspicion for compressive polyradiculopathy.    --Continue care per primary team.  --Continue TLSO brace when out of bed more than 5 minutes or prn for comfort while in bed.  --Will obtain baseline plain films of lumbar spine in brace.  --Will arrange for pt to follow up in six weeks in outpatient clinic with interval plain films to confirm healing fracture.  --Cleared for discharge per neurosurgical perspective upon completion of baseline plain films.   --Neurosurgery will be signing off, please contact us with any questions or concerns.

## 2019-05-24 NOTE — ED TRIAGE NOTES
"Pt. Presents to ED today with c/o mechanical fall earlier today. Hx of chronic back pain and "broken back" at t12. States fell by trying to help granddaughter from standing position, landed on back, "heard a pop". Pt. Non ambulatory on scene, backboard placed by ems. No step offs, deformities, pain in tspine while log rolling. Denies loc, denies numbness/tingling in legs/arms, urinary incontinence. No acute distress noted.   "

## 2019-05-24 NOTE — ED PROVIDER NOTES
Encounter Date: 5/23/2019       History     Chief Complaint   Patient presents with    Back Pain/ Fall     Patient has chronic back pain and fell this evening. Denies any head trauma or any other injuries     59-year-old female arrives to the ER via EMS after a fall.  Patient reports chronic back pain with a history of a fracture to T12.  Tonight she sustained a mechanical trip and fall, she hit her lower back.  She denies any head injury or LOC. No pain to the upper or lower extremities. No loss of bowel or bladder control.  Patient arrives to us on the spine board.  Patient was log rolled, the entire spine was palpated with tenderness to the lower thoracic spine through the lumbar spine.  No pain in the neck or the upper back.  Good rectal tone        Review of patient's allergies indicates:   Allergen Reactions    Bactrim [sulfamethoxazole-trimethoprim] Hives    Codeine Hives    Penicillins Hives    Shellfish containing products Itching    Sulfa (sulfonamide antibiotics) Hives     Past Medical History:   Diagnosis Date    CHF (congestive heart failure)     CKD stage G3a/A3, GFR 45-59 and albumin creatinine ratio >300 mg/g 5/16/2018    CVA (cerebral vascular accident) 2017    Essential hypertension 4/9/2018    Factor XI deficiency 1978    GIB (gastrointestinal bleeding) 10/18/2018    History of hepatitis C     s/p succesful Rx w/ [facility] by Dr Herbert  - SVR12 (cure) 2016    Hyperlipidemia     Mixed hyperlipidemia 4/9/2018    Postoperative hypothyroidism     Proteinuria 4/9/2018    Type 2 diabetes mellitus with circulatory disorder, with long-term current use of insulin 4/9/2018     Past Surgical History:   Procedure Laterality Date    BACK SURGERY      COLONOSCOPY  11/13/2015    Dr Chino torres prep. internal hemorrhoids. diverticulosis of sigmois. NO polyps. repeat due 2025    galbladder      HYSTERECTOMY      OOPHORECTOMY      one    TONSILLECTOMY       Family History   Problem  Relation Age of Onset    Diabetes type II Mother      Social History     Tobacco Use    Smoking status: Never Smoker    Smokeless tobacco: Never Used   Substance Use Topics    Alcohol use: Yes     Alcohol/week: 2.4 oz     Types: 4 Cans of beer per week     Comment: weekly    Drug use: No     Review of Systems   Constitutional: Negative for fever.   HENT: Negative for sore throat.    Respiratory: Negative for shortness of breath.    Cardiovascular: Negative for chest pain.   Gastrointestinal: Negative for nausea.   Genitourinary: Negative for dysuria.   Musculoskeletal: Positive for back pain.   Skin: Negative for rash.   Neurological: Negative for weakness.   Hematological: Does not bruise/bleed easily.       Physical Exam     Initial Vitals [05/23/19 1903]   BP Pulse Resp Temp SpO2   (!) 186/114 91 18 98.6 °F (37 °C) 98 %      MAP       --         Physical Exam    Constitutional: Vital signs are normal. She appears well-developed and well-nourished.   HENT:   Head: Normocephalic and atraumatic.   Eyes: Conjunctivae are normal.   Cardiovascular: Normal rate and regular rhythm.   Abdominal: Soft. Normal appearance and bowel sounds are normal.   Musculoskeletal: Normal range of motion.   Lumbar and lower thoracic spine tender on exam  Good rectal tone  Patient has no neck pain  No signs of injury or trauma  She is moving all of the extremities well  Good sensation throughout the upper and lower extremities  Patient reports low back pain when she moves the lower extremities.  She has a positive SLR   Neurological: She is alert and oriented to person, place, and time.   Skin: Skin is warm and intact.   Psychiatric: She has a normal mood and affect. Her speech is normal and behavior is normal. Cognition and memory are normal.         ED Course   Procedures  Labs Reviewed   CBC W/ AUTO DIFFERENTIAL - Abnormal; Notable for the following components:       Result Value    Hemoglobin 11.2 (*)     Hematocrit 34.4 (*)      RDW 14.6 (*)     Immature Granulocytes 0.6 (*)     Gran # (ANC) 9.0 (*)     Immature Grans (Abs) 0.07 (*)     Mono # 1.1 (*)     Lymph% 17.2 (*)     All other components within normal limits   COMPREHENSIVE METABOLIC PANEL - Abnormal; Notable for the following components:    Potassium 3.1 (*)     CO2 21 (*)     BUN, Bld 22 (*)     Creatinine 1.7 (*)     Albumin 2.9 (*)     AST 61 (*)     eGFR if  38.0 (*)     eGFR if non  33.0 (*)     All other components within normal limits   URINALYSIS, REFLEX TO URINE CULTURE - Abnormal; Notable for the following components:    Protein, UA 2+ (*)     Glucose, UA 1+ (*)     Occult Blood UA 2+ (*)     All other components within normal limits    Narrative:     Preferred Collection Type->Urine, Clean Catch   URINALYSIS MICROSCOPIC - Abnormal; Notable for the following components:    Hyaline Casts, UA 0. (*)     All other components within normal limits    Narrative:     Preferred Collection Type->Urine, Clean Catch   MAGNESIUM - Abnormal; Notable for the following components:    Magnesium 1.4 (*)     All other components within normal limits   PROTIME-INR   APTT   PHOSPHORUS   PROTIME-INR   APTT   TYPE & SCREEN   POCT GLUCOSE MONITORING CONTINUOUS          Imaging Results          X-Ray Lumbar Spine Ap And Lateral (Final result)  Result time 05/24/19 11:48:10    Final result by Varun López Jr., MD (05/24/19 11:48:10)                 Impression:      Please note images are not weight-bearing.  There is significant bony demineralization.  Allowing for this alignment appears satisfactory.  Post vertebroplasty changes T12 noted.      Electronically signed by: Varun López MD  Date:    05/24/2019  Time:    11:48             Narrative:    EXAMINATION:  XR LUMBAR SPINE AP AND LATERAL    CLINICAL HISTORY:  standing and supine in TLSO brace;    TECHNIQUE:  AP, lateral and spot images were performed of the lumbar spine.  According to the technologist's  note the exam was done supine as well as a cross-table lateral.    COMPARISON:  May 23, 2019.    FINDINGS:  Post vertebroplasty changes.  Bones are demineralized.  Alignment is satisfactory on these nonweightbearing views.  Bony demineralization diminishes evaluation.                               CT Lumbar Spine Without Contrast (Final result)  Result time 05/24/19 07:49:16    Final result by Colt Uriostegui MD (05/24/19 07:49:16)                 Impression:      Acute/subacute superior endplate compression deformity of the L2 vertebral body with mild height loss.  Associated small epidural hematoma described on MRI is not well characterized on this exam, refer to recent MRI lumbar spine for detailed evaluation.    Electronically signed by resident: Que Brower  Date:    05/24/2019  Time:    06:03    Electronically signed by: Colt Uriostegui MD  Date:    05/24/2019  Time:    07:49             Narrative:    EXAMINATION:  CT LUMBAR SPINE WITHOUT CONTRAST    CLINICAL HISTORY:  fracture and adjacent edh. L2;    TECHNIQUE:  Low-dose axial, sagittal and coronal reformations are obtained through the lumbar spine.  Contrast was not administered.    COMPARISON:  MRI lumbar spine from earlier today.  Lumbar spine radiograph from 05/23/2019.  Multiple prior exams.    FINDINGS:  Alignment of lumbar vertebral bodies remains within normal limits.    Acute/subacute superior endplate compression deformity at L2 with mild height loss (less than 50%).  Previously described associated small epidural hematoma along the posterior margin of L2 is not well characterized on this exam, refer to recent MRI lumbar spine for detailed evaluation.    Stable T12 compression fracture status post vertebroplasty with minimal retropulsion of posterosuperior fragment, no significant spinal canal stenosis.    Mild multilevel degenerative disc disease with disc space narrowing most prominent at T11-T12, T12-L1, and L5-S1.    Mild lumbar  spondylosis.  No significant spinal canal stenosis or neural foraminal narrowing.    The remaining visualized paravertebral structures demonstrate no pathology. Partially visualized intra-abdominal structures demonstrate mild atherosclerosis without aneurysm.                                MRI Lumbar Spine Without Contrast (Final result)  Result time 05/24/19 04:42:54    Final result by Vinnie Lagos MD (05/24/19 04:42:54)                 Impression:      Acute/subacute L2 superior endplate compression deformity with mild height loss.    There is a small epidural hematoma present along the posterior margin of L2 on the left resulting in some flattening and compression of the adjacent thecal sac. This results in mild canal stenosis at this level and moderate narrowing of the lateral recess and neural foramen on the left.    Chronic T12 compression deformity status post vertebroplasty with minimal retropulsion of posterosuperior fragment. No significant spinal canal stenosis.    This report was flagged in Epic as abnormal.    Electronically signed by resident: Que Brower  Date:    05/24/2019  Time:    03:57    Electronically signed by: Vinnie Lagos MD  Date:    05/24/2019  Time:    04:42             Narrative:    EXAMINATION:  MRI LUMBAR SPINE WITHOUT CONTRAST    CLINICAL HISTORY:  Abnormal xray, lumbar spine, bone destruction;T/L-spine trauma, minor-mod, low back pain;    TECHNIQUE:  Multiplanar, multisequence MR images were acquired from the thoracolumbar junction to the sacrum without the administration of contrast.    COMPARISON:  Lumbar spine radiograph from 05/23/2019.    FINDINGS:  Alignment: Within normal limits.    Vertebrae: Superior endplate compression deformity at L2 with corresponding low T1 bone marrow signal and increased T2 stir signal near the superior endplate, findings suggest acute/subacute compression deformity. There mild height loss, less than 50%.  There is a small epidural  hematoma present along the posterior margin of L2 on the left resulting in some flattening and compression of the adjacent thecal sac.  This results in mild canal stenosis at this level and moderate narrowing of the lateral recess and neural foramen on the left.  Chronic T12 compression deformity with severe height loss and changes status post vertebroplasty appear unchanged.  There is minimal retropulsion of the posterosuperior fragment of T12, no significant spinal canal stenosis.    Discs: Minimal degenerative disc disease most prominent at L5-S1.    Cord: Normal.  Conus terminates at the level of the L1 vertebral body.    Minimal lumbar spondylosis. No significant spinal canal stenosis or neural foraminal narrowing.    Paraspinal muscles & soft tissues: Unremarkable.                               X-Ray Lumbar Spine Ap And Lateral (Final result)  Result time 05/23/19 21:56:15    Final result by Vinnie Lagos MD (05/23/19 21:56:15)                 Impression:      Remote T12 compression fracture status post vertebroplasty changes stable in appearance compared to prior chest x-ray.    Superior endplate compression deformity at L2, new since CT June 29, 2018.  Posterior vertebral margin at L2 appears intact.    Remaining thoracic and lumbar vertebral body heights appear maintained.    Additional findings as above.      Electronically signed by: Vinnie Lagos MD  Date:    05/23/2019  Time:    21:56             Narrative:    EXAMINATION:  XR THORACIC SPINE AP LATERAL; XR LUMBAR SPINE AP AND LATERAL    CLINICAL HISTORY:  Low back pain, minor trauma;  Low back pain    TECHNIQUE:  AP and lateral views of the thoracic and lumbar spine.    COMPARISON:  Chest x-ray PA and lateral February 7, 2019 and CT abdomen June 29, 2018.    FINDINGS:  Alignment: Alignment is maintained.    Vertebrae: Remote T12 compression fracture status post vertebroplasty changes stable in appearance compared to prior chest x-ray.  Superior  endplate compression deformity at L2, new since CT June 29, 2018.  Remaining thoracic and lumbar vertebral body heights appear maintained.  No suspicious appearing lytic or blastic lesions.    Discs and facets: Mild disc space narrowing L5-S1.  Remaining thoracic and lumbar disc spaces appear adequately maintained.  Thoracic osteophytosis appears similar to prior CT.  Mild degenerative changes lower lumbar facet joints.    Miscellaneous: Cholecystectomy clips right upper quadrant abdomen.  Vascular calcifications are seen.                               X-Ray Thoracic Spine AP Lateral (Final result)  Result time 05/23/19 21:56:15    Final result by Vinnie Lagos MD (05/23/19 21:56:15)                 Impression:      Remote T12 compression fracture status post vertebroplasty changes stable in appearance compared to prior chest x-ray.    Superior endplate compression deformity at L2, new since CT June 29, 2018.  Posterior vertebral margin at L2 appears intact.    Remaining thoracic and lumbar vertebral body heights appear maintained.    Additional findings as above.      Electronically signed by: Vinnie Lagos MD  Date:    05/23/2019  Time:    21:56             Narrative:    EXAMINATION:  XR THORACIC SPINE AP LATERAL; XR LUMBAR SPINE AP AND LATERAL    CLINICAL HISTORY:  Low back pain, minor trauma;  Low back pain    TECHNIQUE:  AP and lateral views of the thoracic and lumbar spine.    COMPARISON:  Chest x-ray PA and lateral February 7, 2019 and CT abdomen June 29, 2018.    FINDINGS:  Alignment: Alignment is maintained.    Vertebrae: Remote T12 compression fracture status post vertebroplasty changes stable in appearance compared to prior chest x-ray.  Superior endplate compression deformity at L2, new since CT June 29, 2018.  Remaining thoracic and lumbar vertebral body heights appear maintained.  No suspicious appearing lytic or blastic lesions.    Discs and facets: Mild disc space narrowing L5-S1.  Remaining  thoracic and lumbar disc spaces appear adequately maintained.  Thoracic osteophytosis appears similar to prior CT.  Mild degenerative changes lower lumbar facet joints.    Miscellaneous: Cholecystectomy clips right upper quadrant abdomen.  Vascular calcifications are seen.                              X-Rays:   Independently Interpreted Readings:   Other Readings:  Chronic fracture of T12 identified  New endplate fracture identified at L2    Medical Decision Making:   Initial Assessment:   59-year-old female with low back pain following a fall  Differential Diagnosis:   Fracture, contusion, muscle spasm  Independently Interpreted Test(s):   I have ordered and independently interpreted X-rays - see prior notes.  Clinical Tests:   Radiological Study: Ordered and Reviewed  ED Management:  X-ray revealed old fracture of T12 and new endplate fracture at L2  Case discussed with spine on-call  Neurosurgery recommended MRI.   Around 4:30 a.m. patient was complaining of urinary retention.   Bladder scan revealed greater than 1 L of urine.   Sheth catheter placed feels that over 1200 cc of urine  Rectal exam performed reveals diminished rectal tone.   Neurosurgery contacted regarding new findings,   MRI resulted showing an epidural hematoma with some cord compression, concern for acute cord compression, cauda equina syndrome    Discussed with Neurosurgery, they will come evaluate the patient.  Neurosurgery does not feel at the cord compression is remarkable enough to cause such severe neuro findings as urinary retention.   Patient may have been retaining urine secondary to medication administration, although is is still awake alert and fully conversative.   She will be admitted to hospital medicine.   Other:   I have discussed this case with another health care provider.              Attending Attestation:     Physician Attestation Statement for NP/PA:   I discussed this assessment and plan of this patient with the NP/PA, but  I did not personally examine the patient. The face to face encounter was performed by the NP/PA.    Other NP/PA Attestation Additions:    History of Present Illness: Fall, back pain                      Clinical Impression:       ICD-10-CM ICD-9-CM   1. Closed compression fracture of second lumbar vertebra, initial encounter S32.020A 805.4   2. Low back pain M54.5 724.2   3. Urinary retention R33.9 788.20   4. Epidural hematoma S06.4X9A 852.40         Disposition:   Disposition: Admitted  Condition: Serious                        Avery Ignacio PA-C  05/24/19 0453       Avery Ignacio PA-C  05/24/19 0521       Huy Johnson III, MD  05/25/19 7829

## 2019-05-24 NOTE — ED NOTES
Pt resting on cardiac and O2 monitor. Will continue to monitor pt condition frequently. Will call admit team in regards of patient blood pressure. Pt denies needs at this time . Call bell in reach. Family at bedside.

## 2019-05-24 NOTE — ED NOTES
Pt c/o being unable to void. >999 ML urine obtained via bladder scan.  notified. Verbal order to put in mera catheter.

## 2019-05-24 NOTE — HPI
"Kathie Quezada is a 58 y.o. woman with a history of HFpEF (EF: 60% 1/19), CKD Stage G3a/A3 c/b Nephrotic Range Proteinuria, DM2 (A1c: 5.2%) not taking Insulin at home, HTN, HLD, Hypothyroidism (s/p Thyroidectomy), GERD, Hx of CVA (no residual deficits) and Hx HCV s/p Jovannioni (2015) with reported undetectable VL, and chronic back pain 2/2 T12 fracture who presents to [facility] ED via EMS after a fall at home. Pt reports picking up her grand-daughter, becoming unbalanced, and falling on her back. She denies any dizziness, lightheadedness, or other pre-syncopal symptoms prior to the fall. She also denies any head trauma. However, she notes a "crack" upon her impact with the ground in addition to the onset of pain. She also had "no feeling in my legs" and experienced a loss of sensation until EMS arrived at the scene. Pt denies any recent exacerbations related to her chronic co-morbidities. Her baseline back pain is about a 5/10 and she takes no medication for it; her T12 fracture occurred approx 3 years ago. She denies any recent exacerbations in terms of SOB, leg swelling, and BP control. She further denies any F/C, N/V, diarrhea, constipation, dysuria, CP, palpitations.     While in the ED, Pt was reported to complain of urinary retention. A bladder scan revealed >1L and Pt had UOP of 1200cc w/ insertion of a urinary catheter. Pt is uncertain if it was her pain that limited her ability to urinate. She has not had issues with urinary retention, incontinence, decreased stream, straining to urinate previously.   "

## 2019-05-24 NOTE — ED NOTES
Pt identifiers Kathie Quezada were checked and are correct  LOC: The patient is awake, alert, aware of environment with an appropriate affect. Oriented x4, speaking appropriately  APPEARANCE: Pt rates back pain a 7/10 , in no acute distress, pt is clean and well groomed, clothing properly fastened Sheth catheter to  bag patent draining yellow colored urine   SKIN: Skin warm, dry and intact, normal skin turgor, moist mucus membranes  RESPIRATORY: Airway is open and patent, respirations are spontaneous, even and unlabored, normal effort and rate  CARDIAC: Normal rate and rhythm, no peripheral edema noted, capillary refill < 3 seconds, bilateral radial pulses 2+  ABDOMEN: Soft, nontender, nondistended. Bowel sounds present to all four quad of abd on auscultation   NEUROLOGIC: PERRL, facial expression is symmetrical, patient moving all extremities spontaneously, normal sensation in all extremities when touched with a finger.  Follows all commands appropriately  MUSCULOSKELETAL: No obvious deformities.

## 2019-05-25 PROBLEM — R50.9 FEVER: Status: ACTIVE | Noted: 2019-05-25

## 2019-05-25 LAB
ALBUMIN SERPL BCP-MCNC: 2.7 G/DL (ref 3.5–5.2)
ALP SERPL-CCNC: 107 U/L (ref 55–135)
ALT SERPL W/O P-5'-P-CCNC: 19 U/L (ref 10–44)
ANION GAP SERPL CALC-SCNC: 10 MMOL/L (ref 8–16)
AST SERPL-CCNC: 22 U/L (ref 10–40)
BACTERIA #/AREA URNS AUTO: ABNORMAL /HPF
BASOPHILS # BLD AUTO: 0.04 K/UL (ref 0–0.2)
BASOPHILS NFR BLD: 0.3 % (ref 0–1.9)
BILIRUB SERPL-MCNC: 0.5 MG/DL (ref 0.1–1)
BILIRUB UR QL STRIP: NEGATIVE
BUN SERPL-MCNC: 21 MG/DL (ref 6–20)
CALCIUM SERPL-MCNC: 8.4 MG/DL (ref 8.7–10.5)
CHLORIDE SERPL-SCNC: 107 MMOL/L (ref 95–110)
CLARITY UR REFRACT.AUTO: CLEAR
CO2 SERPL-SCNC: 21 MMOL/L (ref 23–29)
COLOR UR AUTO: YELLOW
CREAT SERPL-MCNC: 2 MG/DL (ref 0.5–1.4)
CREAT UR-MCNC: 78 MG/DL (ref 15–325)
DIFFERENTIAL METHOD: ABNORMAL
EOSINOPHIL # BLD AUTO: 0.3 K/UL (ref 0–0.5)
EOSINOPHIL NFR BLD: 1.9 % (ref 0–8)
ERYTHROCYTE [DISTWIDTH] IN BLOOD BY AUTOMATED COUNT: 14.6 % (ref 11.5–14.5)
EST. GFR  (AFRICAN AMERICAN): 31.3 ML/MIN/1.73 M^2
EST. GFR  (NON AFRICAN AMERICAN): 27.1 ML/MIN/1.73 M^2
ESTIMATED AVG GLUCOSE: 91 MG/DL (ref 68–131)
GLUCOSE SERPL-MCNC: 86 MG/DL (ref 70–110)
GLUCOSE UR QL STRIP: ABNORMAL
HBA1C MFR BLD HPLC: 4.8 % (ref 4–5.6)
HCT VFR BLD AUTO: 33.2 % (ref 37–48.5)
HGB BLD-MCNC: 10.6 G/DL (ref 12–16)
HGB UR QL STRIP: ABNORMAL
HYALINE CASTS UR QL AUTO: 4 /LPF
IMM GRANULOCYTES # BLD AUTO: 0.06 K/UL (ref 0–0.04)
IMM GRANULOCYTES NFR BLD AUTO: 0.4 % (ref 0–0.5)
KETONES UR QL STRIP: NEGATIVE
LEUKOCYTE ESTERASE UR QL STRIP: ABNORMAL
LYMPHOCYTES # BLD AUTO: 1.6 K/UL (ref 1–4.8)
LYMPHOCYTES NFR BLD: 11.4 % (ref 18–48)
MAGNESIUM SERPL-MCNC: 1.6 MG/DL (ref 1.6–2.6)
MCH RBC QN AUTO: 27.8 PG (ref 27–31)
MCHC RBC AUTO-ENTMCNC: 31.9 G/DL (ref 32–36)
MCV RBC AUTO: 87 FL (ref 82–98)
MICROSCOPIC COMMENT: ABNORMAL
MONOCYTES # BLD AUTO: 1.2 K/UL (ref 0.3–1)
MONOCYTES NFR BLD: 8.9 % (ref 4–15)
NEUTROPHILS # BLD AUTO: 10.7 K/UL (ref 1.8–7.7)
NEUTROPHILS NFR BLD: 77.1 % (ref 38–73)
NITRITE UR QL STRIP: NEGATIVE
NRBC BLD-RTO: 0 /100 WBC
OSMOLALITY SERPL: 283 MOSM/KG (ref 275–295)
OSMOLALITY UR: 331 MOSM/KG (ref 50–1200)
PH UR STRIP: 5 [PH] (ref 5–8)
PLATELET # BLD AUTO: 153 K/UL (ref 150–350)
PMV BLD AUTO: 11 FL (ref 9.2–12.9)
POCT GLUCOSE: 114 MG/DL (ref 70–110)
POCT GLUCOSE: 118 MG/DL (ref 70–110)
POCT GLUCOSE: 125 MG/DL (ref 70–110)
POCT GLUCOSE: 83 MG/DL (ref 70–110)
POTASSIUM SERPL-SCNC: 3.7 MMOL/L (ref 3.5–5.1)
PROT SERPL-MCNC: 6.6 G/DL (ref 6–8.4)
PROT UR QL STRIP: ABNORMAL
RBC # BLD AUTO: 3.81 M/UL (ref 4–5.4)
RBC #/AREA URNS AUTO: 3 /HPF (ref 0–4)
SODIUM SERPL-SCNC: 138 MMOL/L (ref 136–145)
SP GR UR STRIP: 1.01 (ref 1–1.03)
SQUAMOUS #/AREA URNS AUTO: 0 /HPF
URN SPEC COLLECT METH UR: ABNORMAL
UUN UR-MCNC: 375 MG/DL (ref 140–1050)
WBC # BLD AUTO: 13.91 K/UL (ref 3.9–12.7)
WBC #/AREA URNS AUTO: 7 /HPF (ref 0–5)

## 2019-05-25 PROCEDURE — 83036 HEMOGLOBIN GLYCOSYLATED A1C: CPT

## 2019-05-25 PROCEDURE — 99233 PR SUBSEQUENT HOSPITAL CARE,LEVL III: ICD-10-PCS | Mod: GC,,, | Performed by: HOSPITALIST

## 2019-05-25 PROCEDURE — 94799 UNLISTED PULMONARY SVC/PX: CPT

## 2019-05-25 PROCEDURE — 85025 COMPLETE CBC W/AUTO DIFF WBC: CPT

## 2019-05-25 PROCEDURE — 30200315 PPD INTRADERMAL TEST REV CODE 302: Performed by: HOSPITALIST

## 2019-05-25 PROCEDURE — 94761 N-INVAS EAR/PLS OXIMETRY MLT: CPT

## 2019-05-25 PROCEDURE — 36415 COLL VENOUS BLD VENIPUNCTURE: CPT

## 2019-05-25 PROCEDURE — 83935 ASSAY OF URINE OSMOLALITY: CPT

## 2019-05-25 PROCEDURE — 80053 COMPREHEN METABOLIC PANEL: CPT

## 2019-05-25 PROCEDURE — 83735 ASSAY OF MAGNESIUM: CPT

## 2019-05-25 PROCEDURE — 82570 ASSAY OF URINE CREATININE: CPT

## 2019-05-25 PROCEDURE — 97165 OT EVAL LOW COMPLEX 30 MIN: CPT

## 2019-05-25 PROCEDURE — 86580 TB INTRADERMAL TEST: CPT | Performed by: HOSPITALIST

## 2019-05-25 PROCEDURE — 81001 URINALYSIS AUTO W/SCOPE: CPT

## 2019-05-25 PROCEDURE — 84540 ASSAY OF URINE/UREA-N: CPT

## 2019-05-25 PROCEDURE — 25000003 PHARM REV CODE 250: Performed by: STUDENT IN AN ORGANIZED HEALTH CARE EDUCATION/TRAINING PROGRAM

## 2019-05-25 PROCEDURE — 97530 THERAPEUTIC ACTIVITIES: CPT

## 2019-05-25 PROCEDURE — 99233 SBSQ HOSP IP/OBS HIGH 50: CPT | Mod: GC,,, | Performed by: HOSPITALIST

## 2019-05-25 PROCEDURE — 25000003 PHARM REV CODE 250: Performed by: INTERNAL MEDICINE

## 2019-05-25 PROCEDURE — 83930 ASSAY OF BLOOD OSMOLALITY: CPT

## 2019-05-25 PROCEDURE — 11000001 HC ACUTE MED/SURG PRIVATE ROOM

## 2019-05-25 PROCEDURE — 87040 BLOOD CULTURE FOR BACTERIA: CPT

## 2019-05-25 PROCEDURE — 27000221 HC OXYGEN, UP TO 24 HOURS

## 2019-05-25 RX ORDER — CHOLECALCIFEROL (VITAMIN D3) 25 MCG
1000 TABLET ORAL DAILY
Status: DISCONTINUED | OUTPATIENT
Start: 2019-05-25 | End: 2019-05-30 | Stop reason: HOSPADM

## 2019-05-25 RX ORDER — DIPHENHYDRAMINE HCL 25 MG
25 CAPSULE ORAL EVERY 6 HOURS PRN
Status: DISCONTINUED | OUTPATIENT
Start: 2019-05-25 | End: 2019-05-25

## 2019-05-25 RX ORDER — CALCIUM CARBONATE 500(1250)
500 TABLET ORAL ONCE
Status: COMPLETED | OUTPATIENT
Start: 2019-05-25 | End: 2019-05-25

## 2019-05-25 RX ORDER — OXYCODONE HYDROCHLORIDE 10 MG/1
10 TABLET ORAL EVERY 4 HOURS PRN
Status: DISCONTINUED | OUTPATIENT
Start: 2019-05-25 | End: 2019-05-26

## 2019-05-25 RX ORDER — SODIUM CHLORIDE 0.9 % (FLUSH) 0.9 %
10 SYRINGE (ML) INJECTION
Status: DISCONTINUED | OUTPATIENT
Start: 2019-05-25 | End: 2019-05-30 | Stop reason: HOSPADM

## 2019-05-25 RX ORDER — DIPHENHYDRAMINE HCL 50 MG
50 CAPSULE ORAL EVERY 6 HOURS PRN
Status: DISCONTINUED | OUTPATIENT
Start: 2019-05-25 | End: 2019-05-30 | Stop reason: HOSPADM

## 2019-05-25 RX ADMIN — OXYCODONE HYDROCHLORIDE 10 MG: 10 TABLET ORAL at 10:05

## 2019-05-25 RX ADMIN — POTASSIUM CHLORIDE 20 MEQ: 1500 TABLET, EXTENDED RELEASE ORAL at 09:05

## 2019-05-25 RX ADMIN — OXYCODONE HYDROCHLORIDE 10 MG: 10 TABLET ORAL at 03:05

## 2019-05-25 RX ADMIN — TUBERCULIN PURIFIED PROTEIN DERIVATIVE 5 UNITS: 5 INJECTION, SOLUTION INTRADERMAL at 05:05

## 2019-05-25 RX ADMIN — FUROSEMIDE 40 MG: 40 TABLET ORAL at 09:05

## 2019-05-25 RX ADMIN — LISINOPRIL 20 MG: 20 TABLET ORAL at 09:05

## 2019-05-25 RX ADMIN — CALCIUM 500 MG: 500 TABLET ORAL at 03:05

## 2019-05-25 RX ADMIN — DIPHENHYDRAMINE HYDROCHLORIDE 50 MG: 50 CAPSULE ORAL at 03:05

## 2019-05-25 RX ADMIN — LEVOTHYROXINE SODIUM 200 MCG: 100 TABLET ORAL at 09:05

## 2019-05-25 RX ADMIN — AMLODIPINE BESYLATE 10 MG: 10 TABLET ORAL at 09:05

## 2019-05-25 RX ADMIN — ESCITALOPRAM OXALATE 10 MG: 5 TABLET, FILM COATED ORAL at 09:05

## 2019-05-25 RX ADMIN — DIPHENHYDRAMINE HYDROCHLORIDE 25 MG: 25 CAPSULE ORAL at 09:05

## 2019-05-25 RX ADMIN — ATORVASTATIN CALCIUM 10 MG: 10 TABLET, FILM COATED ORAL at 09:05

## 2019-05-25 RX ADMIN — VITAMIN D, TAB 1000IU (100/BT) 1000 UNITS: 25 TAB at 03:05

## 2019-05-25 RX ADMIN — CARVEDILOL 25 MG: 25 TABLET, FILM COATED ORAL at 05:05

## 2019-05-25 RX ADMIN — OXYCODONE HYDROCHLORIDE 10 MG: 10 TABLET ORAL at 05:05

## 2019-05-25 RX ADMIN — PANTOPRAZOLE SODIUM 40 MG: 40 TABLET, DELAYED RELEASE ORAL at 09:05

## 2019-05-25 RX ADMIN — CARVEDILOL 25 MG: 25 TABLET, FILM COATED ORAL at 09:05

## 2019-05-25 NOTE — SUBJECTIVE & OBJECTIVE
Interval History:   NAEO. Pt w/ fever to 100.9 last night. Denies chills and had restful night.   However, c/o of generalized pruritis.     Review of Systems   Constitutional: Positive for fever. Negative for appetite change, chills and diaphoresis.   HENT: Negative for congestion, rhinorrhea, sneezing and sore throat.    Eyes: Negative for visual disturbance.   Respiratory: Negative for cough, shortness of breath and wheezing.    Cardiovascular: Negative for chest pain, palpitations and leg swelling.   Gastrointestinal: Negative for abdominal pain, blood in stool, constipation, diarrhea, nausea and vomiting.   Genitourinary: Positive for flank pain. Negative for dysuria.   Musculoskeletal: Positive for back pain.   Skin: Negative for rash and wound.   Neurological: Negative for dizziness and light-headedness.     Objective:     Vital Signs (Most Recent):  Temp: 99.8 °F (37.7 °C) (05/25/19 0816)  Pulse: 84 (05/25/19 0816)  Resp: 18 (05/25/19 0816)  BP: (!) 150/67 (05/25/19 0816)  SpO2: 99 % (05/25/19 0816) Vital Signs (24h Range):  Temp:  [99.1 °F (37.3 °C)-100.9 °F (38.3 °C)] 99.8 °F (37.7 °C)  Pulse:  [83-94] 84  Resp:  [16-22] 18  SpO2:  [95 %-100 %] 99 %  BP: (136-179)/(63-79) 150/67     Weight: 113.4 kg (250 lb)  Body mass index is 45.73 kg/m².    Intake/Output Summary (Last 24 hours) at 5/25/2019 1032  Last data filed at 5/25/2019 0553  Gross per 24 hour   Intake --   Output 2200 ml   Net -2200 ml      Physical Exam   Constitutional: She is oriented to person, place, and time. No distress.   Pt in brace upon exam   HENT:   Head: Normocephalic and atraumatic.   Right Ear: External ear normal.   Left Ear: External ear normal.   Mouth/Throat: Oropharynx is clear and moist. No oropharyngeal exudate.   Eyes: Pupils are equal, round, and reactive to light. Conjunctivae and EOM are normal. No scleral icterus.   Neck: Normal range of motion. Neck supple. No JVD present. No thyromegaly present.   Cardiovascular: Normal  rate, regular rhythm, normal heart sounds and intact distal pulses. Exam reveals no gallop and no friction rub.   No murmur heard.  Pulmonary/Chest: Effort normal and breath sounds normal. No respiratory distress. She has no wheezes. She exhibits no tenderness.   Abdominal: Soft. Bowel sounds are normal. There is no tenderness. There is no guarding.   Genitourinary:   Genitourinary Comments: Sheth in place   Musculoskeletal: She exhibits tenderness (flank and back). She exhibits no edema.   Lymphadenopathy:     She has no cervical adenopathy.   Neurological: She is alert and oriented to person, place, and time. No sensory deficit.   BL UE strength 5/5  BL LE strength 4/5   Skin: Skin is warm and dry. No rash noted. She is not diaphoretic.   Psychiatric: She has a normal mood and affect.   Vitals reviewed.      Significant Labs:   Blood Culture: No results for input(s): LABBLOO in the last 48 hours.  CBC:   Recent Labs   Lab 05/24/19  0558 05/25/19  0348   WBC 12.51 13.91*   HGB 11.2* 10.6*   HCT 34.4* 33.2*    153     CMP:   Recent Labs   Lab 05/24/19  0558 05/25/19  0348    138   K 3.1* 3.7    107   CO2 21* 21*   GLU 80 86   BUN 22* 21*   CREATININE 1.7* 2.0*   CALCIUM 8.7 8.4*   PROT 7.1 6.6   ALBUMIN 2.9* 2.7*   BILITOT 0.5 0.5   ALKPHOS 122 107   AST 61* 22   ALT 28 19   ANIONGAP 11 10   EGFRNONAA 33.0* 27.1*     Urine Culture: No results for input(s): LABURIN in the last 48 hours.  Urine Studies:   Recent Labs   Lab 05/24/19  0906   COLORU Straw   APPEARANCEUA Clear   PHUR 6.0   SPECGRAV 1.010   PROTEINUA 2+*   GLUCUA 1+*   KETONESU Negative   BILIRUBINUA Negative   OCCULTUA 2+*   NITRITE Negative   LEUKOCYTESUR Negative   RBCUA 3   WBCUA 0   BACTERIA Rare   HYALINECASTS 0.*       Significant Imaging: I have reviewed all pertinent imaging results/findings within the past 24 hours.

## 2019-05-25 NOTE — ASSESSMENT & PLAN NOTE
Previous extensive CKD workup indicates that HTN and DM2 etiologies of CKD (JB, cryoglobulins, SPEP/UPEP, ANCA's, and complement all wnl's)  At baseline Cr upon admit, however slight 0.3 bump 5/25  Holding lasix, urine lytes pending  Continue to monitor RFTs  Avoid nephrotoxic agents

## 2019-05-25 NOTE — ASSESSMENT & PLAN NOTE
Previous extensive CKD workup indicates that HTN and DM2 etiologies of CKD (JB, cryoglobulins, SPEP/UPEP, ANCA's, and complement all wnl's)  At baseline  Continue to monitor RFTs  Avoid nephrotoxic agents

## 2019-05-25 NOTE — ASSESSMENT & PLAN NOTE
Pt euvolemic on exam.   No c/o of SOB, CP, palpitations.   Holding home lasix 40mg BID in setting of potential ARUNA.

## 2019-05-25 NOTE — ASSESSMENT & PLAN NOTE
58 yo female s/p mechanical fall with superior L2 compression fracture.   Small bilateral EDH on MRI adjacent to pedicles at L2 with mild thecal sac compression.  L-spine Ct w/ stable superior endplate compression fracture  Per NSGY,    No acute neurosurgical intervention   TLSO brace when out of bed more than 5 minutes or prn for comfort while in bed   Will arrange for pt to follow up in six weeks in outpatient clinic with interval plain films to confirm healing fracture   Cleared for discharge per neurosurgical perspective upon completion of baseline plain films  Pain control  Previous DEXA w/ osteopenia; initiating Ca and Vit supplements

## 2019-05-25 NOTE — ASSESSMENT & PLAN NOTE
On arrival Pt w/ BP 170s/80s. Per Pt, baseline on home medication closer to SBPs 130s-140s.  Home medications include: amlodipine 10mg qd, coreg 25mg bid, lisinopril 20mg qd. Also hydralazine 25mg for SBP>160.  Present values likely due to acute fracture pain. Will optimize pain regimen.   Continue home medications; titrate as necessary.

## 2019-05-25 NOTE — SUBJECTIVE & OBJECTIVE
Past Medical History:   Diagnosis Date    CHF (congestive heart failure)     CKD stage G3a/A3, GFR 45-59 and albumin creatinine ratio >300 mg/g 5/16/2018    CVA (cerebral vascular accident) 2017    Essential hypertension 4/9/2018    Factor XI deficiency 1978    GIB (gastrointestinal bleeding) 10/18/2018    History of hepatitis C     s/p succesful Rx w/ [facility] by Dr Herbert  - [id number] (cure) 2016    Hyperlipidemia     Mixed hyperlipidemia 4/9/2018    Postoperative hypothyroidism     Proteinuria 4/9/2018    Type 2 diabetes mellitus with circulatory disorder, with long-term current use of insulin 4/9/2018       Past Surgical History:   Procedure Laterality Date    BACK SURGERY      COLONOSCOPY  11/13/2015    Dr Chino Herbert - brian prep. internal hemorrhoids. diverticulosis of sigmois. NO polyps. repeat due 2025    galbladder      HYSTERECTOMY      OOPHORECTOMY      one    TONSILLECTOMY         Review of patient's allergies indicates:   Allergen Reactions    Bactrim [sulfamethoxazole-trimethoprim] Hives    Codeine Hives    Penicillins Hives    Shellfish containing products Itching    Sulfa (sulfonamide antibiotics) Hives       No current facility-administered medications on file prior to encounter.      Current Outpatient Medications on File Prior to Encounter   Medication Sig    amLODIPine (NORVASC) 10 MG tablet Take 1 tablet (10 mg total) by mouth once daily.    aspirin (ECOTRIN) 81 MG EC tablet Take 1 tablet (81 mg total) by mouth once daily.    atorvastatin (LIPITOR) 10 MG tablet TK 1 T PO QHS    blood sugar diagnostic Strp To check BG 1 times daily, to use with insurance preferred meter    carvedilol (COREG) 25 MG tablet Take 25 mg by mouth 2 (two) times daily with meals.     escitalopram oxalate (LEXAPRO) 10 MG tablet Take 10 mg by mouth once daily.    furosemide (LASIX) 40 MG tablet Take 1 tablet (40 mg total) by mouth 2 (two) times daily.    gabapentin (NEURONTIN) 100 MG capsule Take 1  "capsule (100 mg total) by mouth 3 (three) times daily. (Patient taking differently: Take 100 mg by mouth once daily. )    hydrALAZINE (APRESOLINE) 25 MG tablet Take 1 tablet daily for Systolic (top number) Blood Pressure greater than 160    lancets Misc To check BG 1 times daily, to use with insurance preferred meter    lisinopril (PRINIVIL,ZESTRIL) 40 MG tablet Take 20 mg by mouth once daily.     pantoprazole (PROTONIX) 40 MG tablet Take 1 tablet (40 mg total) by mouth once daily.    pen needle, diabetic (BD ULTRA-FINE OLGA LIDIA PEN NEEDLE) 32 gauge x 5/32" Ndle To use with Lantus solostar insulin pens    SYNTHROID 200 mcg tablet Take 1 tablet (200 mcg total) by mouth once daily. BRAND NAME ONLY    traZODone (DESYREL) 50 MG tablet Take 50 mg by mouth daily as needed.     Family History     Problem Relation (Age of Onset)    Diabetes type II Mother        Tobacco Use    Smoking status: Never Smoker    Smokeless tobacco: Never Used   Substance and Sexual Activity    Alcohol use: Yes     Alcohol/week: 2.4 oz     Types: 4 Cans of beer per week     Comment: weekly    Drug use: No    Sexual activity: Not on file     Review of Systems   Constitutional: Negative for activity change, appetite change, chills, fever and unexpected weight change.   HENT: Negative for congestion, sinus pain, sneezing, sore throat and trouble swallowing.    Eyes: Negative for visual disturbance.   Respiratory: Negative for cough, shortness of breath and wheezing.    Cardiovascular: Negative for chest pain, palpitations and leg swelling.   Gastrointestinal: Negative for abdominal pain, constipation, diarrhea, nausea and vomiting.   Genitourinary: Positive for flank pain. Negative for difficulty urinating and dysuria.   Musculoskeletal: Positive for back pain.   Skin: Negative for rash and wound.   Neurological: Negative for dizziness, light-headedness and headaches.     Objective:     Vital Signs (Most Recent):  Temp: 99.1 °F (37.3 °C) " (05/24/19 1043)  Pulse: 89 (05/24/19 1530)  Resp: 16 (05/24/19 1530)  BP: (!) 176/78 (05/24/19 1530)  SpO2: 95 % (05/24/19 1530) Vital Signs (24h Range):  Temp:  [98.4 °F (36.9 °C)-99.2 °F (37.3 °C)] 99.1 °F (37.3 °C)  Pulse:  [83-97] 89  Resp:  [16-27] 16  SpO2:  [93 %-100 %] 95 %  BP: (141-209)/(58-93) 176/78     Weight: 113.4 kg (250 lb)  Body mass index is 45.73 kg/m².    Physical Exam   Constitutional: She is oriented to person, place, and time. She appears well-developed and well-nourished. No distress.   Pt in brace upon exam   HENT:   Head: Normocephalic and atraumatic.   Right Ear: External ear normal.   Left Ear: External ear normal.   Mouth/Throat: Oropharynx is clear and moist. No oropharyngeal exudate.   Eyes: Pupils are equal, round, and reactive to light. Conjunctivae and EOM are normal. No scleral icterus.   Neck: Normal range of motion. Neck supple. No thyromegaly present.   Cardiovascular: Normal rate, regular rhythm, normal heart sounds and intact distal pulses. Exam reveals no gallop and no friction rub.   No murmur heard.  Pulmonary/Chest: Effort normal and breath sounds normal. No respiratory distress. She has no wheezes. She has no rales.   Abdominal: Soft. Bowel sounds are normal. She exhibits no distension. There is no tenderness. There is no guarding.   Musculoskeletal: She exhibits tenderness (flank and back).   Lymphadenopathy:     She has no cervical adenopathy.   Neurological: She is alert and oriented to person, place, and time. No sensory deficit.   BL UE strength 5/5  BL LE strength 4/5   Skin: Skin is warm and dry. No rash noted. She is not diaphoretic. No erythema.   Psychiatric: She has a normal mood and affect.   Nursing note and vitals reviewed.        CRANIAL NERVES     CN III, IV, VI   Pupils are equal, round, and reactive to light.  Extraocular motions are normal.        Significant Labs:   Recent Lab Results       05/24/19  1103   05/24/19  0906   05/24/19  0558    05/23/19  2333        Albumin     2.9       Alkaline Phosphatase     122       ALT     28       Anion Gap     11       Appearance, UA   Clear         aPTT 28.2  Comment:  aPTT therapeutic range = 39-69 seconds     28.7  Comment:  aPTT therapeutic range = 39-69 seconds     AST     61       Bacteria, UA   Rare         Baso #     0.05       Basophil%     0.4       Bilirubin (UA)   Negative         BILIRUBIN TOTAL     0.5  Comment:  For infants and newborns, interpretation of results should be based  on gestational age, weight and in agreement with clinical  observations.  Premature Infant recommended reference ranges:  Up to 24 hours.............<8.0 mg/dL  Up to 48 hours............<12.0 mg/dL  3-5 days..................<15.0 mg/dL  6-29 days.................<15.0 mg/dL         BUN, Bld     22       Calcium     8.7       Chloride     109       CO2     21       Color, UA   Straw         Creatinine     1.7       Differential Method     Automated       eGFR if      38.0       eGFR if non      33.0  Comment:  Calculation used to obtain the estimated glomerular filtration  rate (eGFR) is the CKD-EPI equation.          Eos #     0.1       Eosinophil%     1.1       Glucose     80       Glucose, UA   1+         Gran # (ANC)     9.0       Gran%     71.7       Group & Rh       B NEG     Hematocrit     34.4       Hemoglobin     11.2       Hyaline Casts, UA   0.         Immature Grans (Abs)     0.07  Comment:  Mild elevation in immature granulocytes is non specific and   can be seen in a variety of conditions including stress response,   acute inflammation, trauma and pregnancy. Correlation with other   laboratory and clinical findings is essential.         Immature Granulocytes     0.6       INDIRECT CAR       NEG     Coumadin Monitoring INR 1.1  Comment:  Coumadin Therapy:  2.0 - 3.0 for INR for all indicators except mechanical heart valves  and antiphospholipid syndromes which should use 2.5  - 3.5.       1.1  Comment:  Coumadin Therapy:  2.0 - 3.0 for INR for all indicators except mechanical heart valves  and antiphospholipid syndromes which should use 2.5 - 3.5.       Ketones, UA   Negative         Leukocytes, UA   Negative         Lymph #     2.2       Lymph%     17.2       Magnesium 1.4           MCH     27.9       MCHC     32.6       MCV     86       Microscopic Comment   SEE COMMENT  Comment:  Other formed elements not mentioned in the report are not   present in the microscopic examination.            Mono #     1.1       Mono%     9.0       MPV     10.6       NITRITE UA   Negative         nRBC     0       Occult Blood UA   2+         pH, UA   6.0         Phosphorus 3.7           Platelets     174       Potassium     3.1       PROTEIN TOTAL     7.1       Protein, UA   2+  Comment:  Recommend a 24 hour urine protein or a urine   protein/creatinine ratio if globulin induced proteinuria is  clinically suspected.           Protime 10.9     11.0     RBC     4.02       RBC, UA   3         RDW     14.6       Sodium     141       Specific [place], UA   1.010         Specimen UA   Urine, Clean Catch         WBC, UA   0         WBC     12.51             Significant Imaging: I have reviewed all pertinent imaging results/findings within the past 24 hours.   Imaging Results          X-Ray Lumbar Spine Ap And Lateral (Final result)  Result time 05/24/19 11:48:10    Final result by Varun López Jr., MD (05/24/19 11:48:10)                 Impression:      Please note images are not weight-bearing.  There is significant bony demineralization.  Allowing for this alignment appears satisfactory.  Post vertebroplasty changes T12 noted.      Electronically signed by: Varun López MD  Date:    05/24/2019  Time:    11:48             Narrative:    EXAMINATION:  XR LUMBAR SPINE AP AND LATERAL    CLINICAL HISTORY:  standing and supine in TLSO brace;    TECHNIQUE:  AP, lateral and spot images were performed of the lumbar  spine.  According to the technologist's note the exam was done supine as well as a cross-table lateral.    COMPARISON:  May 23, 2019.    FINDINGS:  Post vertebroplasty changes.  Bones are demineralized.  Alignment is satisfactory on these nonweightbearing views.  Bony demineralization diminishes evaluation.                               CT Lumbar Spine Without Contrast (Final result)  Result time 05/24/19 07:49:16    Final result by Colt Uriostegui MD (05/24/19 07:49:16)                 Impression:      Acute/subacute superior endplate compression deformity of the L2 vertebral body with mild height loss.  Associated small epidural hematoma described on MRI is not well characterized on this exam, refer to recent MRI lumbar spine for detailed evaluation.    Electronically signed by resident: Que Brower  Date:    05/24/2019  Time:    06:03    Electronically signed by: Colt Uriostegui MD  Date:    05/24/2019  Time:    07:49             Narrative:    EXAMINATION:  CT LUMBAR SPINE WITHOUT CONTRAST    CLINICAL HISTORY:  fracture and adjacent edh. L2;    TECHNIQUE:  Low-dose axial, sagittal and coronal reformations are obtained through the lumbar spine.  Contrast was not administered.    COMPARISON:  MRI lumbar spine from earlier today.  Lumbar spine radiograph from 05/23/2019.  Multiple prior exams.    FINDINGS:  Alignment of lumbar vertebral bodies remains within normal limits.    Acute/subacute superior endplate compression deformity at L2 with mild height loss (less than 50%).  Previously described associated small epidural hematoma along the posterior margin of L2 is not well characterized on this exam, refer to recent MRI lumbar spine for detailed evaluation.    Stable T12 compression fracture status post vertebroplasty with minimal retropulsion of posterosuperior fragment, no significant spinal canal stenosis.    Mild multilevel degenerative disc disease with disc space narrowing most prominent at T11-T12,  T12-L1, and L5-S1.    Mild lumbar spondylosis.  No significant spinal canal stenosis or neural foraminal narrowing.    The remaining visualized paravertebral structures demonstrate no pathology. Partially visualized intra-abdominal structures demonstrate mild atherosclerosis without aneurysm.                                MRI Lumbar Spine Without Contrast (Final result)  Result time 05/24/19 04:42:54    Final result by Vinnie Lagos MD (05/24/19 04:42:54)                 Impression:      Acute/subacute L2 superior endplate compression deformity with mild height loss.    There is a small epidural hematoma present along the posterior margin of L2 on the left resulting in some flattening and compression of the adjacent thecal sac. This results in mild canal stenosis at this level and moderate narrowing of the lateral recess and neural foramen on the left.    Chronic T12 compression deformity status post vertebroplasty with minimal retropulsion of posterosuperior fragment. No significant spinal canal stenosis.    This report was flagged in [employer] as abnormal.    Electronically signed by resident: Que Brower  Date:    05/24/2019  Time:    03:57    Electronically signed by: Vinnie Lagos MD  Date:    05/24/2019  Time:    04:42             Narrative:    EXAMINATION:  MRI LUMBAR SPINE WITHOUT CONTRAST    CLINICAL HISTORY:  Abnormal xray, lumbar spine, bone destruction;T/L-spine trauma, minor-mod, low back pain;    TECHNIQUE:  Multiplanar, multisequence MR images were acquired from the thoracolumbar junction to the sacrum without the administration of contrast.    COMPARISON:  Lumbar spine radiograph from 05/23/2019.    FINDINGS:  Alignment: Within normal limits.    Vertebrae: Superior endplate compression deformity at L2 with corresponding low T1 bone marrow signal and increased T2 stir signal near the superior endplate, findings suggest acute/subacute compression deformity. There mild height loss, less than  50%.  There is a small epidural hematoma present along the posterior margin of L2 on the left resulting in some flattening and compression of the adjacent thecal sac.  This results in mild canal stenosis at this level and moderate narrowing of the lateral recess and neural foramen on the left.  Chronic T12 compression deformity with severe height loss and changes status post vertebroplasty appear unchanged.  There is minimal retropulsion of the posterosuperior fragment of T12, no significant spinal canal stenosis.    Discs: Minimal degenerative disc disease most prominent at L5-S1.    Cord: Normal.  Conus terminates at the level of the L1 vertebral body.    Minimal lumbar spondylosis. No significant spinal canal stenosis or neural foraminal narrowing.    Paraspinal muscles & soft tissues: Unremarkable.                               X-Ray Lumbar Spine Ap And Lateral (Final result)  Result time 05/23/19 21:56:15    Final result by Vinnie Lagos MD (05/23/19 21:56:15)                 Impression:      Remote T12 compression fracture status post vertebroplasty changes stable in appearance compared to prior chest x-ray.    Superior endplate compression deformity at L2, new since CT June 29, 2018.  Posterior vertebral margin at L2 appears intact.    Remaining thoracic and lumbar vertebral body heights appear maintained.    Additional findings as above.      Electronically signed by: Vinnie Lagos MD  Date:    05/23/2019  Time:    21:56             Narrative:    EXAMINATION:  XR THORACIC SPINE AP LATERAL; XR LUMBAR SPINE AP AND LATERAL    CLINICAL HISTORY:  Low back pain, minor trauma;  Low back pain    TECHNIQUE:  AP and lateral views of the thoracic and lumbar spine.    COMPARISON:  Chest x-ray PA and lateral February 7, 2019 and CT abdomen June 29, 2018.    FINDINGS:  Alignment: Alignment is maintained.    Vertebrae: Remote T12 compression fracture status post vertebroplasty changes stable in appearance compared  to prior chest x-ray.  Superior endplate compression deformity at L2, new since CT June 29, 2018.  Remaining thoracic and lumbar vertebral body heights appear maintained.  No suspicious appearing lytic or blastic lesions.    Discs and facets: Mild disc space narrowing L5-S1.  Remaining thoracic and lumbar disc spaces appear adequately maintained.  Thoracic osteophytosis appears similar to prior CT.  Mild degenerative changes lower lumbar facet joints.    Miscellaneous: Cholecystectomy clips right upper quadrant abdomen.  Vascular calcifications are seen.                               X-Ray Thoracic Spine AP Lateral (Final result)  Result time 05/23/19 21:56:15    Final result by Vinnie Lagos MD (05/23/19 21:56:15)                 Impression:      Remote T12 compression fracture status post vertebroplasty changes stable in appearance compared to prior chest x-ray.    Superior endplate compression deformity at L2, new since CT June 29, 2018.  Posterior vertebral margin at L2 appears intact.    Remaining thoracic and lumbar vertebral body heights appear maintained.    Additional findings as above.      Electronically signed by: Vinnie Lagos MD  Date:    05/23/2019  Time:    21:56             Narrative:    EXAMINATION:  XR THORACIC SPINE AP LATERAL; XR LUMBAR SPINE AP AND LATERAL    CLINICAL HISTORY:  Low back pain, minor trauma;  Low back pain    TECHNIQUE:  AP and lateral views of the thoracic and lumbar spine.    COMPARISON:  Chest x-ray PA and lateral February 7, 2019 and CT abdomen June 29, 2018.    FINDINGS:  Alignment: Alignment is maintained.    Vertebrae: Remote T12 compression fracture status post vertebroplasty changes stable in appearance compared to prior chest x-ray.  Superior endplate compression deformity at L2, new since CT June 29, 2018.  Remaining thoracic and lumbar vertebral body heights appear maintained.  No suspicious appearing lytic or blastic lesions.    Discs and facets: Mild disc  space narrowing L5-S1.  Remaining thoracic and lumbar disc spaces appear adequately maintained.  Thoracic osteophytosis appears similar to prior CT.  Mild degenerative changes lower lumbar facet joints.    Miscellaneous: Cholecystectomy clips right upper quadrant abdomen.  Vascular calcifications are seen.

## 2019-05-25 NOTE — ASSESSMENT & PLAN NOTE
Fever documented overnight to 100.9  Concurrent but modest increase in WCC to 13.91  Suspect more inflammatory reaction in setting of fracture vs infectious etiology  CXR negative for acute process, UA w/o infection, blood cx pending but low suspicion for +  Will monitor off abx presently

## 2019-05-25 NOTE — H&P
"[facility] Medicine  History & Physical    Patient Name: Kathie Quezada  MRN: [id number]  Admission Date: 5/23/2019  Attending Physician: Huy Johnson III, MD   Primary Care Provider: Elan Price MD    [facility] Medicine Team: [facility]OS[facility] MED 4 Mita Ceron MD     Patient information was obtained from patient, past medical records and ER records.     Subjective:     Principal Problem:Closed compression fracture of L2 lumbar vertebra    Chief Complaint:   Chief Complaint   Patient presents with    Back Pain/ Fall     Patient has chronic back pain and fell this evening. Denies any head trauma or any other injuries        HPI: Kathie Quezada is a 58 y.o. woman with a history of HFpEF (EF: 60% 1/19), CKD Stage G3a/A3 c/b Nephrotic Range Proteinuria, DM2 (A1c: 5.2%) not taking Insulin at home, HTN, HLD, Hypothyroidism (s/p Thyroidectomy), GERD, Hx of CVA (no residual deficits) and Hx HCV s/p Harvoni (2015) with reported undetectable VL, and chronic back pain 2/2 T12 fracture who presents to [facility] ED via EMS after a fall at home. Pt reports picking up her grand-daughter, becoming unbalanced, and falling on her back. She denies any dizziness, lightheadedness, or other pre-syncopal symptoms prior to the fall. She also denies any head trauma. However, she notes a "crack" upon her impact with the ground in addition to the onset of pain. She also had "no feeling in my legs" and experienced a loss of sensation until EMS arrived at the scene. Pt denies any recent exacerbations related to her chronic co-morbidities. Her baseline back pain is about a 5/10 and she takes no medication for it; her T12 fracture occurred approx 3 years ago. She denies any recent exacerbations in terms of SOB, leg swelling, and BP control. She further denies any F/C, N/V, diarrhea, constipation, dysuria, CP, palpitations.     While in the ED, Pt was reported to complain of urinary retention. A bladder scan " revealed >1L and Pt had UOP of 1200cc w/ insertion of a urinary catheter. Pt is uncertain if it was her pain that limited her ability to urinate. She has not had issues with urinary retention, incontinence, decreased stream, straining to urinate previously.     Past Medical History:   Diagnosis Date    CHF (congestive heart failure)     CKD stage G3a/A3, GFR 45-59 and albumin creatinine ratio >300 mg/g 5/16/2018    CVA (cerebral vascular accident) 2017    Essential hypertension 4/9/2018    Factor XI deficiency 1978    GIB (gastrointestinal bleeding) 10/18/2018    History of hepatitis C     s/p succesful Rx w/ Harvoni by Dr Herbert  - SVR12 (cure) 2016    Hyperlipidemia     Mixed hyperlipidemia 4/9/2018    Postoperative hypothyroidism     Proteinuria 4/9/2018    Type 2 diabetes mellitus with circulatory disorder, with long-term current use of insulin 4/9/2018       Past Surgical History:   Procedure Laterality Date    BACK SURGERY      COLONOSCOPY  11/13/2015    Dr Chino Herbert - brian prep. internal hemorrhoids. diverticulosis of sigmois. NO polyps. repeat due 2025    galbladder      HYSTERECTOMY      OOPHORECTOMY      one    TONSILLECTOMY         Review of patient's allergies indicates:   Allergen Reactions    Bactrim [sulfamethoxazole-trimethoprim] Hives    Codeine Hives    Penicillins Hives    Shellfish containing products Itching    Sulfa (sulfonamide antibiotics) Hives       No current facility-administered medications on file prior to encounter.      Current Outpatient Medications on File Prior to Encounter   Medication Sig    amLODIPine (NORVASC) 10 MG tablet Take 1 tablet (10 mg total) by mouth once daily.    aspirin (ECOTRIN) 81 MG EC tablet Take 1 tablet (81 mg total) by mouth once daily.    atorvastatin (LIPITOR) 10 MG tablet TK 1 T PO QHS    blood sugar diagnostic Strp To check BG 1 times daily, to use with insurance preferred meter    carvedilol (COREG) 25 MG tablet Take 25 mg by  "mouth 2 (two) times daily with meals.     escitalopram oxalate (LEXAPRO) 10 MG tablet Take 10 mg by mouth once daily.    furosemide (LASIX) 40 MG tablet Take 1 tablet (40 mg total) by mouth 2 (two) times daily.    gabapentin (NEURONTIN) 100 MG capsule Take 1 capsule (100 mg total) by mouth 3 (three) times daily. (Patient taking differently: Take 100 mg by mouth once daily. )    hydrALAZINE (APRESOLINE) 25 MG tablet Take 1 tablet daily for Systolic (top number) Blood Pressure greater than 160    lancets Misc To check BG 1 times daily, to use with insurance preferred meter    lisinopril (PRINIVIL,ZESTRIL) 40 MG tablet Take 20 mg by mouth once daily.     pantoprazole (PROTONIX) 40 MG tablet Take 1 tablet (40 mg total) by mouth once daily.    pen needle, diabetic (BD ULTRA-FINE OLGA LIDIA PEN NEEDLE) 32 gauge x 5/32" Ndle To use with Lantus solostar insulin pens    SYNTHROID 200 mcg tablet Take 1 tablet (200 mcg total) by mouth once daily. BRAND NAME ONLY    traZODone (DESYREL) 50 MG tablet Take 50 mg by mouth daily as needed.     Family History     Problem Relation (Age of Onset)    Diabetes type II Mother        Tobacco Use    Smoking status: Never Smoker    Smokeless tobacco: Never Used   Substance and Sexual Activity    Alcohol use: Yes     Alcohol/week: 2.4 oz     Types: 4 Cans of beer per week     Comment: weekly    Drug use: No    Sexual activity: Not on file     Review of Systems   Constitutional: Negative for activity change, appetite change, chills, fever and unexpected weight change.   HENT: Negative for congestion, sinus pain, sneezing, sore throat and trouble swallowing.    Eyes: Negative for visual disturbance.   Respiratory: Negative for cough, shortness of breath and wheezing.    Cardiovascular: Negative for chest pain, palpitations and leg swelling.   Gastrointestinal: Negative for abdominal pain, constipation, diarrhea, nausea and vomiting.   Genitourinary: Positive for flank pain. Negative " for difficulty urinating and dysuria.   Musculoskeletal: Positive for back pain.   Skin: Negative for rash and wound.   Neurological: Negative for dizziness, light-headedness and headaches.     Objective:     Vital Signs (Most Recent):  Temp: 99.1 °F (37.3 °C) (05/24/19 1043)  Pulse: 89 (05/24/19 1530)  Resp: 16 (05/24/19 1530)  BP: (!) 176/78 (05/24/19 1530)  SpO2: 95 % (05/24/19 1530) Vital Signs (24h Range):  Temp:  [98.4 °F (36.9 °C)-99.2 °F (37.3 °C)] 99.1 °F (37.3 °C)  Pulse:  [83-97] 89  Resp:  [16-27] 16  SpO2:  [93 %-100 %] 95 %  BP: (141-209)/(58-93) 176/78     Weight: 113.4 kg (250 lb)  Body mass index is 45.73 kg/m².    Physical Exam   Constitutional: She is oriented to person, place, and time. She appears well-developed and well-nourished. No distress.   Pt in brace upon exam   HENT:   Head: Normocephalic and atraumatic.   Right Ear: External ear normal.   Left Ear: External ear normal.   Mouth/Throat: Oropharynx is clear and moist. No oropharyngeal exudate.   Eyes: Pupils are equal, round, and reactive to light. Conjunctivae and EOM are normal. No scleral icterus.   Neck: Normal range of motion. Neck supple. No thyromegaly present.   Cardiovascular: Normal rate, regular rhythm, normal heart sounds and intact distal pulses. Exam reveals no gallop and no friction rub.   No murmur heard.  Pulmonary/Chest: Effort normal and breath sounds normal. No respiratory distress. She has no wheezes. She has no rales.   Abdominal: Soft. Bowel sounds are normal. She exhibits no distension. There is no tenderness. There is no guarding.   Musculoskeletal: She exhibits tenderness (flank and back).   Lymphadenopathy:     She has no cervical adenopathy.   Neurological: She is alert and oriented to person, place, and time. No sensory deficit.   BL UE strength 5/5  BL LE strength 4/5   Skin: Skin is warm and dry. No rash noted. She is not diaphoretic. No erythema.   Psychiatric: She has a normal mood and affect.   Nursing  note and vitals reviewed.        CRANIAL NERVES     CN III, IV, VI   Pupils are equal, round, and reactive to light.  Extraocular motions are normal.        Significant Labs:   Recent Lab Results       05/24/19  1103   05/24/19  0906   05/24/19  0558   05/23/19  2333        Albumin     2.9       Alkaline Phosphatase     122       ALT     28       Anion Gap     11       Appearance, UA   Clear         aPTT 28.2  Comment:  aPTT therapeutic range = 39-69 seconds     28.7  Comment:  aPTT therapeutic range = 39-69 seconds     AST     61       Bacteria, UA   Rare         Baso #     0.05       Basophil%     0.4       Bilirubin (UA)   Negative         BILIRUBIN TOTAL     0.5  Comment:  For infants and newborns, interpretation of results should be based  on gestational age, weight and in agreement with clinical  observations.  Premature Infant recommended reference ranges:  Up to 24 hours.............<8.0 mg/dL  Up to 48 hours............<12.0 mg/dL  3-5 days..................<15.0 mg/dL  6-29 days.................<15.0 mg/dL         BUN, Bld     22       Calcium     8.7       Chloride     109       CO2     21       Color, UA   Straw         Creatinine     1.7       Differential Method     Automated       eGFR if      38.0       eGFR if non      33.0  Comment:  Calculation used to obtain the estimated glomerular filtration  rate (eGFR) is the CKD-EPI equation.          Eos #     0.1       Eosinophil%     1.1       Glucose     80       Glucose, UA   1+         Gran # (ANC)     9.0       Gran%     71.7       Group & Rh       B NEG     Hematocrit     34.4       Hemoglobin     11.2       Hyaline Casts, UA   0.         Immature Grans (Abs)     0.07  Comment:  Mild elevation in immature granulocytes is non specific and   can be seen in a variety of conditions including stress response,   acute inflammation, trauma and pregnancy. Correlation with other   laboratory and clinical findings is  essential.         Immature Granulocytes     0.6       INDIRECT CAR       NEG     Coumadin Monitoring INR 1.1  Comment:  Coumadin Therapy:  2.0 - 3.0 for INR for all indicators except mechanical heart valves  and antiphospholipid syndromes which should use 2.5 - 3.5.       1.1  Comment:  Coumadin Therapy:  2.0 - 3.0 for INR for all indicators except mechanical heart valves  and antiphospholipid syndromes which should use 2.5 - 3.5.       Ketones, UA   Negative         Leukocytes, UA   Negative         Lymph #     2.2       Lymph%     17.2       Magnesium 1.4           MCH     27.9       MCHC     32.6       MCV     86       Microscopic Comment   SEE COMMENT  Comment:  Other formed elements not mentioned in the report are not   present in the microscopic examination.            Mono #     1.1       Mono%     9.0       MPV     10.6       NITRITE UA   Negative         nRBC     0       Occult Blood UA   2+         pH, UA   6.0         Phosphorus 3.7           Platelets     174       Potassium     3.1       PROTEIN TOTAL     7.1       Protein, UA   2+  Comment:  Recommend a 24 hour urine protein or a urine   protein/creatinine ratio if globulin induced proteinuria is  clinically suspected.           Protime 10.9     11.0     RBC     4.02       RBC, UA   3         RDW     14.6       Sodium     141       Specific [place], UA   1.010         Specimen UA   Urine, Clean Catch         WBC, UA   0         WBC     12.51             Significant Imaging: I have reviewed all pertinent imaging results/findings within the past 24 hours.   Imaging Results          X-Ray Lumbar Spine Ap And Lateral (Final result)  Result time 05/24/19 11:48:10    Final result by Varun López Jr., MD (05/24/19 11:48:10)                 Impression:      Please note images are not weight-bearing.  There is significant bony demineralization.  Allowing for this alignment appears satisfactory.  Post vertebroplasty changes T12 noted.      Electronically  signed by: Varun López MD  Date:    05/24/2019  Time:    11:48             Narrative:    EXAMINATION:  XR LUMBAR SPINE AP AND LATERAL    CLINICAL HISTORY:  standing and supine in TLSO brace;    TECHNIQUE:  AP, lateral and spot images were performed of the lumbar spine.  According to the technologist's note the exam was done supine as well as a cross-table lateral.    COMPARISON:  May 23, 2019.    FINDINGS:  Post vertebroplasty changes.  Bones are demineralized.  Alignment is satisfactory on these nonweightbearing views.  Bony demineralization diminishes evaluation.                               CT Lumbar Spine Without Contrast (Final result)  Result time 05/24/19 07:49:16    Final result by Colt Uriostegui MD (05/24/19 07:49:16)                 Impression:      Acute/subacute superior endplate compression deformity of the L2 vertebral body with mild height loss.  Associated small epidural hematoma described on MRI is not well characterized on this exam, refer to recent MRI lumbar spine for detailed evaluation.    Electronically signed by resident: Que Brower  Date:    05/24/2019  Time:    06:03    Electronically signed by: Colt Uriostegui MD  Date:    05/24/2019  Time:    07:49             Narrative:    EXAMINATION:  CT LUMBAR SPINE WITHOUT CONTRAST    CLINICAL HISTORY:  fracture and adjacent edh. L2;    TECHNIQUE:  Low-dose axial, sagittal and coronal reformations are obtained through the lumbar spine.  Contrast was not administered.    COMPARISON:  MRI lumbar spine from earlier today.  Lumbar spine radiograph from 05/23/2019.  Multiple prior exams.    FINDINGS:  Alignment of lumbar vertebral bodies remains within normal limits.    Acute/subacute superior endplate compression deformity at L2 with mild height loss (less than 50%).  Previously described associated small epidural hematoma along the posterior margin of L2 is not well characterized on this exam, refer to recent MRI lumbar spine for detailed  evaluation.    Stable T12 compression fracture status post vertebroplasty with minimal retropulsion of posterosuperior fragment, no significant spinal canal stenosis.    Mild multilevel degenerative disc disease with disc space narrowing most prominent at T11-T12, T12-L1, and L5-S1.    Mild lumbar spondylosis.  No significant spinal canal stenosis or neural foraminal narrowing.    The remaining visualized paravertebral structures demonstrate no pathology. Partially visualized intra-abdominal structures demonstrate mild atherosclerosis without aneurysm.                                MRI Lumbar Spine Without Contrast (Final result)  Result time 05/24/19 04:42:54    Final result by Vinnie Lagos MD (05/24/19 04:42:54)                 Impression:      Acute/subacute L2 superior endplate compression deformity with mild height loss.    There is a small epidural hematoma present along the posterior margin of L2 on the left resulting in some flattening and compression of the adjacent thecal sac. This results in mild canal stenosis at this level and moderate narrowing of the lateral recess and neural foramen on the left.    Chronic T12 compression deformity status post vertebroplasty with minimal retropulsion of posterosuperior fragment. No significant spinal canal stenosis.    This report was flagged in Epic as abnormal.    Electronically signed by resident: Que Brower  Date:    05/24/2019  Time:    03:57    Electronically signed by: Vinnie Lagos MD  Date:    05/24/2019  Time:    04:42             Narrative:    EXAMINATION:  MRI LUMBAR SPINE WITHOUT CONTRAST    CLINICAL HISTORY:  Abnormal xray, lumbar spine, bone destruction;T/L-spine trauma, minor-mod, low back pain;    TECHNIQUE:  Multiplanar, multisequence MR images were acquired from the thoracolumbar junction to the sacrum without the administration of contrast.    COMPARISON:  Lumbar spine radiograph from 05/23/2019.    FINDINGS:  Alignment: Within  normal limits.    Vertebrae: Superior endplate compression deformity at L2 with corresponding low T1 bone marrow signal and increased T2 stir signal near the superior endplate, findings suggest acute/subacute compression deformity. There mild height loss, less than 50%.  There is a small epidural hematoma present along the posterior margin of L2 on the left resulting in some flattening and compression of the adjacent thecal sac.  This results in mild canal stenosis at this level and moderate narrowing of the lateral recess and neural foramen on the left.  Chronic T12 compression deformity with severe height loss and changes status post vertebroplasty appear unchanged.  There is minimal retropulsion of the posterosuperior fragment of T12, no significant spinal canal stenosis.    Discs: Minimal degenerative disc disease most prominent at L5-S1.    Cord: Normal.  Conus terminates at the level of the L1 vertebral body.    Minimal lumbar spondylosis. No significant spinal canal stenosis or neural foraminal narrowing.    Paraspinal muscles & soft tissues: Unremarkable.                               X-Ray Lumbar Spine Ap And Lateral (Final result)  Result time 05/23/19 21:56:15    Final result by Vinnie Laogs MD (05/23/19 21:56:15)                 Impression:      Remote T12 compression fracture status post vertebroplasty changes stable in appearance compared to prior chest x-ray.    Superior endplate compression deformity at L2, new since CT June 29, 2018.  Posterior vertebral margin at L2 appears intact.    Remaining thoracic and lumbar vertebral body heights appear maintained.    Additional findings as above.      Electronically signed by: Vinnie Lagos MD  Date:    05/23/2019  Time:    21:56             Narrative:    EXAMINATION:  XR THORACIC SPINE AP LATERAL; XR LUMBAR SPINE AP AND LATERAL    CLINICAL HISTORY:  Low back pain, minor trauma;  Low back pain    TECHNIQUE:  AP and lateral views of the thoracic and  lumbar spine.    COMPARISON:  Chest x-ray PA and lateral February 7, 2019 and CT abdomen June 29, 2018.    FINDINGS:  Alignment: Alignment is maintained.    Vertebrae: Remote T12 compression fracture status post vertebroplasty changes stable in appearance compared to prior chest x-ray.  Superior endplate compression deformity at L2, new since CT June 29, 2018.  Remaining thoracic and lumbar vertebral body heights appear maintained.  No suspicious appearing lytic or blastic lesions.    Discs and facets: Mild disc space narrowing L5-S1.  Remaining thoracic and lumbar disc spaces appear adequately maintained.  Thoracic osteophytosis appears similar to prior CT.  Mild degenerative changes lower lumbar facet joints.    Miscellaneous: Cholecystectomy clips right upper quadrant abdomen.  Vascular calcifications are seen.                               X-Ray Thoracic Spine AP Lateral (Final result)  Result time 05/23/19 21:56:15    Final result by Vinnie Lagos MD (05/23/19 21:56:15)                 Impression:      Remote T12 compression fracture status post vertebroplasty changes stable in appearance compared to prior chest x-ray.    Superior endplate compression deformity at L2, new since CT June 29, 2018.  Posterior vertebral margin at L2 appears intact.    Remaining thoracic and lumbar vertebral body heights appear maintained.    Additional findings as above.      Electronically signed by: Vinnie Lagos MD  Date:    05/23/2019  Time:    21:56             Narrative:    EXAMINATION:  XR THORACIC SPINE AP LATERAL; XR LUMBAR SPINE AP AND LATERAL    CLINICAL HISTORY:  Low back pain, minor trauma;  Low back pain    TECHNIQUE:  AP and lateral views of the thoracic and lumbar spine.    COMPARISON:  Chest x-ray PA and lateral February 7, 2019 and CT abdomen June 29, 2018.    FINDINGS:  Alignment: Alignment is maintained.    Vertebrae: Remote T12 compression fracture status post vertebroplasty changes stable in appearance  "compared to prior chest x-ray.  Superior endplate compression deformity at L2, new since CT June 29, 2018.  Remaining thoracic and lumbar vertebral body heights appear maintained.  No suspicious appearing lytic or blastic lesions.    Discs and facets: Mild disc space narrowing L5-S1.  Remaining thoracic and lumbar disc spaces appear adequately maintained.  Thoracic osteophytosis appears similar to prior CT.  Mild degenerative changes lower lumbar facet joints.    Miscellaneous: Cholecystectomy clips right upper quadrant abdomen.  Vascular calcifications are seen.                                  Assessment/Plan:     * Closed compression fracture of L2 lumbar vertebra  58 yo female s/p mechanical fall with superior L2 compression fracture.   Small bilateral EDH on MRI adjacent to pedicles at L2 with mild thecal sac compression.  L-spine Ct w/ stable superior endplate compression fracture  Per NSGY,    No acute neurosurgical intervention   TLSO brace when out of bed more than 5 minutes or prn for comfort while in bed   Will arrange for pt to follow up in six weeks in outpatient clinic with interval plain films to confirm healing fracture   Cleared for discharge per neurosurgical perspective upon completion of baseline plain films         Acute urinary retention  58 yo female s/p mechanical fall with superior L2 compression fracture.  After presentation to ED, c/o of urinary retnetion w/ bladder scan significant for >1L retained urine and UOP w/ mera approx 1200cc.  Pt denies previous issue with urinary of bowel incontinence or retention.   Potential etiologies of acute retention include spinal compression, pain, pain medication received upon admission, UTI.   Per NSGY, "degree of canal compression not typical for development of acute severe polyradiculopathy."  UTI negative for infection.   Will remove mera and attempt voiding trial.   Continue pain control.   Neuro-checks q4 hours.    Hypokalemia  Admit K " 3.1  Likely due to diuretic use  Replete and monitor      GERD (gastroesophageal reflux disease)  - Protonix 40 mg daily      Hypothyroidism  - Synthroid 200 mcg daily  - Recent TSH/FT4 wnl's    Factor XI deficiency  Holding lovenox  SCDs for DVT ppx      (HFpEF) heart failure with preserved ejection fraction  Pt euvolemic on exam.   No c/o of SOB, CP, palpitations.   Continue home lasix 40mg BID.       CKD stage G3b/A3, GFR 30-44 and albumin creatinine ratio >300 mg/g  Previous extensive CKD workup indicates that HTN and DM2 etiologies of CKD (JB, cryoglobulins, SPEP/UPEP, ANCA's, and complement all wnl's)  At baseline  Continue to monitor RFTs  Avoid nephrotoxic agents       Hyperlipidemia  Continue home statin  Previous CVA hx, no residual side effects      HTN (hypertension)  On arrival Pt w/ BP 170s/80s. Per Pt, baseline on home medication closer to SBPs 130s-140s.  Home medications include: amlodipine 10mg qd, coreg 25mg bid, lisinopril 20mg qd. Also hydralazine 25mg for SBP>160.  Present values likely due to acute fracture pain. Will optimize pain regimen.   Continue home medications; titrate as necessary.        DM2 (diabetes mellitus, type 2)  -low dose SSI, not taking Insulin at home and most recent A1c at goal  -diabetic diet        VTE Risk Mitigation (From admission, onward)        Ordered     Place sequential compression device  Until discontinued      05/24/19 1644     IP VTE HIGH RISK PATIENT  Once      05/24/19 1019             Mita Ceron MD   IM, PGY-1  Department of Hospital Medicine   [facility]

## 2019-05-25 NOTE — ASSESSMENT & PLAN NOTE
"60 yo female s/p mechanical fall with superior L2 compression fracture.  After presentation to ED, c/o of urinary retnetion w/ bladder scan significant for >1L retained urine and UOP w/ mera approx 1200cc.  Pt denies previous issue with urinary of bowel incontinence or retention.   Potential etiologies of acute retention include spinal compression, pain, pain medication received upon admission, UTI.   Per NSGY, "degree of canal compression not typical for development of acute severe polyradiculopathy."    UTI negative for infection.   Will remove mera and attempt voiding trial.   Will consider renal U/S if further retention.  Continue pain control.   Neuro-checks q4 hours.  "

## 2019-05-25 NOTE — PT/OT/SLP EVAL
Occupational Therapy   Evaluation    Name: Kathie Quezada  MRN: [id number]  Admitting Diagnosis:  Closed compression fracture of L2 lumbar vertebra      Recommendations:     Discharge Recommendations: nursing facility, skilled  Discharge Equipment Recommendations:  commode  Barriers to discharge:  Decreased caregiver support    Assessment:     Kathie Quezada is a 59 y.o. female with a medical diagnosis of Closed compression fracture of L2 lumbar vertebra.  She was able to perform log rolling and sidelying to sit T/F c total assist.  Pt was unable to stand 2* pain and has poor static sitting balance.   Applied TLSO prior to sitting up on EOB.  B UE are WFL.  Performance deficits affecting function: weakness, impaired endurance, impaired self care skills, impaired functional mobilty, decreased upper extremity function, orthopedic precautions.      Rehab Prognosis: Good; patient would benefit from acute skilled OT services to address these deficits and reach maximum level of function.       Plan:     Patient to be seen 5 x/week to address the above listed problems via self-care/home management, therapeutic activities, therapeutic exercises  · Plan of Care Expires: 06/08/19  · Plan of Care Reviewed with: patient    Subjective     Chief Complaint: Pt is s/p T12 fx and L2 compression fx c TLSO on when pt is up.  Patient/Family Comments/goals: To get better.    Occupational Profile:  Living Environment: Pt lives in a one story house c 1 TALI and has a tub/shower combo.  Previous level of function: I PTA  Roles and Routines: Retired  Equipment Used at Home:  walker, standard, shower chair  Assistance upon Discharge: Pt lives alone but states that her children live nearby and can check in on her.    Pain/Comfort:  · Pain Rating 1: 10/10    Patients cultural, spiritual, Zoroastrian conflicts given the current situation: no    Objective:     Communicated with: RN prior to session.  Patient found supine with TLSO  upon OT entry to room.    General Precautions: Standard, fall   Orthopedic Precautions:spinal precautions   Braces: TLSO     Occupational Performance:    Bed Mobility:    · Patient completed Rolling/Turning to Right with total assistance  · Patient completed Supine to Sit with total assistance    Functional Mobility/Transfers:    · Functional Mobility: Pt was unable to tolerate standing 2* pain.        Cognitive/Visual Perceptual:  Cognitive/Psychosocial Skills:     -       Oriented to: Person, Place, Time and Situation   -       Follows Commands/attention:Follows multistep  commands    Physical Exam:  Upper Extremity Range of Motion:     -       Right Upper Extremity: WFL  -       Left Upper Extremity: WFL  Upper Extremity Strength:    -       Right Upper Extremity: WFL  -       Left Upper Extremity: WFL    AMPAC 6 Click ADL:  AMPAC Total Score: 8  Education:    Patient left supine with all lines intact, call button in reach and RN notified    GOALS:   Multidisciplinary Problems     Occupational Therapy Goals        Problem: Occupational Therapy Goal    Goal Priority Disciplines Outcome Interventions   Occupational Therapy Goal     OT, PT/OT     Description:  Goals to be met by: 6/8/19     Patient will increase functional independence with ADLs by performing:    UE Dressing with [place].  LE Dressing with Modified [place] and Assistive Devices as needed.  Grooming while standing at sink with Modified [place].  Toileting from bedside commode with Modified [place] for hygiene and clothing management.   Bathing from  edge of bed with Modified [place].  Toilet transfer to bedside commode with Modified [place].  Increased functional strength to WFL for B UE.  Upper extremity exercise program x15 reps per handout, with independence.                      History:     Past Medical History:   Diagnosis Date    CHF (congestive heart failure)     CKD stage G3a/A3, GFR 45-59 and albumin  creatinine ratio >300 mg/g 5/16/2018    CVA (cerebral vascular accident) 2017    Essential hypertension 4/9/2018    Factor XI deficiency 1978    GIB (gastrointestinal bleeding) 10/18/2018    History of hepatitis C     s/p succesful Rx w/ Harvoni by Dr Herbert  - SVR12 (cure) 2016    Hyperlipidemia     Mixed hyperlipidemia 4/9/2018    Postoperative hypothyroidism     Proteinuria 4/9/2018    Type 2 diabetes mellitus with circulatory disorder, with long-term current use of insulin 4/9/2018       Past Surgical History:   Procedure Laterality Date    BACK SURGERY      COLONOSCOPY  11/13/2015    Dr Chino torres prep. internal hemorrhoids. diverticulosis of sigmois. NO polyps. repeat due 2025    galbladder      HYSTERECTOMY      OOPHORECTOMY      one    TONSILLECTOMY         Time Tracking:     OT Date of Treatment: 05/25/19  OT Start Time: 1334  OT Stop Time: 1401  OT Total Time (min): 27 min    Billable Minutes:Evaluation 14  Therapeutic Activity 13    LEIGHANN Oconnell  5/25/2019

## 2019-05-25 NOTE — NURSING
Pt resting comfortably throughout shift, pain moderately controlled. Neurologically intact w/ baseline bilateral LE neuropathy described as tingling. Denies numbness. Pt and family members verbalize understanding of and agreement with all facets of plan of care. VSS throughout shift. 2L02 NC applied for shallow resps w/ saturation of 89-91 on RA, BS clear unlabored. Will continue to monitor.

## 2019-05-25 NOTE — HOSPITAL COURSE
60yo woman with HFpEF, CKD3, DM2 not taking insulin at home with peripheral neuropathy, HTN, HLD, hypothyroidism (s/p thyroidectomy), GERD, hx of CVA (no residual deficits), hx of HCV s/p Harvoni (2015) with reported undetectable VL, and chronic back pain 2/2 T12 fracture who presents following a mechanical fall at home, found to have acute L2 compression fracture with small bilateral epidural hematoma. Initially with bilateral lower extremity weakness and concern for urinary retention s/p mera placement in ED. MRI and CT imaging obtained; per NSGY low suspicion for compressive polyradiculopathy. TLSO brace in place when OOB.     Admitted to medicine for pain control, PT/OT evaluation. On 5/26, developed acute hypoxic/hypercapnic respiratory failure, likely 2/2 accidental opioid overdose with undiagnosed SERA and restriction from brace. Improved with narcan and BiPAP. Also found to have fevers to 103. Unclear precipitant of fevers, potentially PNA. Initiated on BSAbx and re-cultured; de-escalated to ceftriaxone for 5 day course. Does have ARUNA on CKD3 which worsened over few days, now peaked and now resolving. Per muddy casts in urine, ATN from likley drug induced vs ischemic (did have recorded low BP during rapid response on 5/26, although this may have been 2/2 poor cuff fit). Urinary retention also resolved.     Ultimately, she will need 6 week NSGY f/u with repeat plain films to confirm healing and continue TLSO brace when OOB. Discharged to In-Pt rehab.

## 2019-05-25 NOTE — ASSESSMENT & PLAN NOTE
60 yo female s/p mechanical fall with superior L2 compression fracture.   Small bilateral EDH on MRI adjacent to pedicles at L2 with mild thecal sac compression.  L-spine Ct w/ stable superior endplate compression fracture  Per NSGY,    No acute neurosurgical intervention   TLSO brace when out of bed more than 5 minutes or prn for comfort while in bed   Will arrange for pt to follow up in six weeks in outpatient clinic with interval plain films to confirm healing fracture   Cleared for discharge per neurosurgical perspective upon completion of baseline plain films

## 2019-05-25 NOTE — PLAN OF CARE
Problem: Occupational Therapy Goal  Goal: Occupational Therapy Goal  Goals to be met by: 6/8/19     Patient will increase functional independence with ADLs by performing:    UE Dressing with [place].  LE Dressing with Modified [place] and Assistive Devices as needed.  Grooming while standing at sink with Modified [place].  Toileting from bedside commode with Modified [place] for hygiene and clothing management.   Bathing from  edge of bed with Modified [place].  Toilet transfer to bedside commode with Modified [place].  Increased functional strength to WFL for B UE.  Upper extremity exercise program x15 reps per handout, with independence.    POC initiated.

## 2019-05-25 NOTE — PROGRESS NOTES
"[facility] Hospital Medicine  Progress Note    Patient Name: Kathie Quezada  MRN: [id number]  Patient Class: IP- Inpatient   Admission Date: 5/23/2019  Length of Stay: 1 days  Attending Physician: Frank Brand, *  Primary Care Provider: Elan Price MD    [facility] Medicine Team: [facility] HOSP MED 4 Mita Ceron MD    Subjective:     Principal Problem:Closed compression fracture of L2 lumbar vertebra    HPI:  Kathie Quezada is a 58 y.o. woman with a history of HFpEF (EF: 60% 1/19), CKD Stage G3a/A3 c/b Nephrotic Range Proteinuria, DM2 (A1c: 5.2%) not taking Insulin at home, HTN, HLD, Hypothyroidism (s/p Thyroidectomy), GERD, Hx of CVA (no residual deficits) and Hx HCV s/p Harvoni (2015) with reported undetectable VL, and chronic back pain 2/2 T12 fracture who presents to [facility] ED via EMS after a fall at home. Pt reports picking up her grand-daughter, becoming unbalanced, and falling on her back. She denies any dizziness, lightheadedness, or other pre-syncopal symptoms prior to the fall. She also denies any head trauma. However, she notes a "crack" upon her impact with the ground in addition to the onset of pain. She also had "no feeling in my legs" and experienced a loss of sensation until EMS arrived at the scene. Pt denies any recent exacerbations related to her chronic co-morbidities. Her baseline back pain is about a 5/10 and she takes no medication for it; her T12 fracture occurred approx 3 years ago. She denies any recent exacerbations in terms of SOB, leg swelling, and BP control. She further denies any F/C, N/V, diarrhea, constipation, dysuria, CP, palpitations.     While in the ED, Pt was reported to complain of urinary retention. A bladder scan revealed >1L and Pt had UOP of 1200cc w/ insertion of a urinary catheter. Pt is uncertain if it was her pain that limited her ability to urinate. She has not had issues with urinary retention, incontinence, decreased stream, " straining to urinate previously.     Hospital Course:  Admitted from the ED to  on 5/24 in setting of urinary retention s/p mechanical fall resulting in L2 compression fracture with associated hematoma. Per NSGY, no acute intervention needed and Pt to f/u out-pt. Pt admitted with neurochecks and to assess resolution of urinary retention following pain control.     Interval History:   NAEO. Pt w/ fever to 100.9 last night. Denies chills and had restful night.   However, c/o of generalized pruritis.     Review of Systems   Constitutional: Positive for fever. Negative for appetite change, chills and diaphoresis.   HENT: Negative for congestion, rhinorrhea, sneezing and sore throat.    Eyes: Negative for visual disturbance.   Respiratory: Negative for cough, shortness of breath and wheezing.    Cardiovascular: Negative for chest pain, palpitations and leg swelling.   Gastrointestinal: Negative for abdominal pain, blood in stool, constipation, diarrhea, nausea and vomiting.   Genitourinary: Positive for flank pain. Negative for dysuria.   Musculoskeletal: Positive for back pain.   Skin: Negative for rash and wound.   Neurological: Negative for dizziness and light-headedness.     Objective:     Vital Signs (Most Recent):  Temp: 99.8 °F (37.7 °C) (05/25/19 0816)  Pulse: 84 (05/25/19 0816)  Resp: 18 (05/25/19 0816)  BP: (!) 150/67 (05/25/19 0816)  SpO2: 99 % (05/25/19 0816) Vital Signs (24h Range):  Temp:  [99.1 °F (37.3 °C)-100.9 °F (38.3 °C)] 99.8 °F (37.7 °C)  Pulse:  [83-94] 84  Resp:  [16-22] 18  SpO2:  [95 %-100 %] 99 %  BP: (136-179)/(63-79) 150/67     Weight: 113.4 kg (250 lb)  Body mass index is 45.73 kg/m².    Intake/Output Summary (Last 24 hours) at 5/25/2019 1032  Last data filed at 5/25/2019 0553  Gross per 24 hour   Intake --   Output 2200 ml   Net -2200 ml      Physical Exam   Constitutional: She is oriented to person, place, and time. No distress.   Pt in brace upon exam   HENT:   Head: Normocephalic and  atraumatic.   Right Ear: External ear normal.   Left Ear: External ear normal.   Mouth/Throat: Oropharynx is clear and moist. No oropharyngeal exudate.   Eyes: Pupils are equal, round, and reactive to light. Conjunctivae and EOM are normal. No scleral icterus.   Neck: Normal range of motion. Neck supple. No JVD present. No thyromegaly present.   Cardiovascular: Normal rate, regular rhythm, normal heart sounds and intact distal pulses. Exam reveals no gallop and no friction rub.   No murmur heard.  Pulmonary/Chest: Effort normal and breath sounds normal. No respiratory distress. She has no wheezes. She exhibits no tenderness.   Abdominal: Soft. Bowel sounds are normal. There is no tenderness. There is no guarding.   Genitourinary:   Genitourinary Comments: Sheth in place   Musculoskeletal: She exhibits tenderness (flank and back). She exhibits no edema.   Lymphadenopathy:     She has no cervical adenopathy.   Neurological: She is alert and oriented to person, place, and time. No sensory deficit.   BL UE strength 5/5  BL LE strength 4/5   Skin: Skin is warm and dry. No rash noted. She is not diaphoretic.   Psychiatric: She has a normal mood and affect.   Vitals reviewed.      Significant Labs:   Blood Culture: No results for input(s): LABBLOO in the last 48 hours.  CBC:   Recent Labs   Lab 05/24/19  0558 05/25/19  0348   WBC 12.51 13.91*   HGB 11.2* 10.6*   HCT 34.4* 33.2*    153     CMP:   Recent Labs   Lab 05/24/19  0558 05/25/19  0348    138   K 3.1* 3.7    107   CO2 21* 21*   GLU 80 86   BUN 22* 21*   CREATININE 1.7* 2.0*   CALCIUM 8.7 8.4*   PROT 7.1 6.6   ALBUMIN 2.9* 2.7*   BILITOT 0.5 0.5   ALKPHOS 122 107   AST 61* 22   ALT 28 19   ANIONGAP 11 10   EGFRNONAA 33.0* 27.1*     Urine Culture: No results for input(s): LABURIN in the last 48 hours.  Urine Studies:   Recent Labs   Lab 05/24/19  0906   COLORU Straw   APPEARANCEUA Clear   PHUR 6.0   SPECGRAV 1.010   PROTEINUA 2+*   GLUCUA 1+*  "  KETONESU Negative   BILIRUBINUA Negative   OCCULTUA 2+*   NITRITE Negative   LEUKOCYTESUR Negative   RBCUA 3   WBCUA 0   BACTERIA Rare   HYALINECASTS 0.*       Significant Imaging: I have reviewed all pertinent imaging results/findings within the past 24 hours.     Assessment/Plan:      * Closed compression fracture of L2 lumbar vertebra  60 yo female s/p mechanical fall with superior L2 compression fracture.   Small bilateral EDH on MRI adjacent to pedicles at L2 with mild thecal sac compression.  L-spine Ct w/ stable superior endplate compression fracture  Per NSGY,    No acute neurosurgical intervention   TLSO brace when out of bed more than 5 minutes or prn for comfort while in bed   Will arrange for pt to follow up in six weeks in outpatient clinic with interval plain films to confirm healing fracture   Cleared for discharge per neurosurgical perspective upon completion of baseline plain films  Pain control  Previous DEXA w/ osteopenia; initiating Ca and Vit supplements        Acute urinary retention  60 yo female s/p mechanical fall with superior L2 compression fracture.  After presentation to ED, c/o of urinary retnetion w/ bladder scan significant for >1L retained urine and UOP w/ mera approx 1200cc.  Pt denies previous issue with urinary of bowel incontinence or retention.   Potential etiologies of acute retention include spinal compression, pain, pain medication received upon admission, UTI.   Per NSGY, "degree of canal compression not typical for development of acute severe polyradiculopathy."    UTI negative for infection.   Will remove mera and attempt voiding trial.   Will consider renal U/S if further retention.  Continue pain control.   Neuro-checks q4 hours.    Fever  Fever documented overnight to 100.9  Concurrent but modest increase in WCC to 13.91  Suspect more inflammatory reaction in setting of fracture vs infectious etiology  CXR negative for acute process, UA w/o infection, blood cx " pending but low suspicion for +  Will monitor off abx presently     Hypokalemia  Admit K 3.1  Likely due to diuretic use  Replete and monitor      GERD (gastroesophageal reflux disease)  - Protonix 40 mg daily      Hypothyroidism  - Synthroid 200 mcg daily  - Recent TSH/FT4 wnl's    Factor XI deficiency  Holding lovenox  SCDs for DVT ppx      (HFpEF) heart failure with preserved ejection fraction  Pt euvolemic on exam.   No c/o of SOB, CP, palpitations.   Holding home lasix 40mg BID in setting of potential ARUNA.       CKD stage G3b/A3, GFR 30-44 and albumin creatinine ratio >300 mg/g  Previous extensive CKD workup indicates that HTN and DM2 etiologies of CKD (JB, cryoglobulins, SPEP/UPEP, ANCA's, and complement all wnl's)  At baseline Cr upon admit, however slight 0.3 bump 5/25  Holding lasix, urine lytes pending  Continue to monitor RFTs  Avoid nephrotoxic agents       Hyperlipidemia  Continue home statin  Previous CVA hx, no residual side effects      HTN (hypertension)  On arrival Pt w/ BP 170s/80s. Per Pt, baseline on home medication closer to SBPs 130s-140s.  Home medications include: amlodipine 10mg qd, coreg 25mg bid, lisinopril 20mg qd. Also hydralazine 25mg for SBP>160.  Present values likely due to acute fracture pain. Will optimize pain regimen.   Continue home medications; titrate as necessary.        DM2 (diabetes mellitus, type 2)  -low dose SSI, not taking Insulin at home and most recent A1c at goal  -diabetic diet        VTE Risk Mitigation (From admission, onward)        Ordered     IP VTE HIGH RISK PATIENT  Once      05/25/19 0553     Place EMILIA hose  Until discontinued      05/25/19 0553     Place sequential compression device  Until discontinued      05/24/19 1984              Mita Ceron MD   IM, PGY-1  Department of Hospital Medicine   [facility]

## 2019-05-26 PROBLEM — R41.82 ALTERED MENTAL STATUS: Status: ACTIVE | Noted: 2019-05-26

## 2019-05-26 PROBLEM — J96.02 ACUTE HYPERCAPNIC RESPIRATORY FAILURE: Status: ACTIVE | Noted: 2019-05-26

## 2019-05-26 LAB
ALBUMIN SERPL BCP-MCNC: 2.4 G/DL (ref 3.5–5.2)
ALBUMIN SERPL BCP-MCNC: 2.7 G/DL (ref 3.5–5.2)
ALLENS TEST: ABNORMAL
ALP SERPL-CCNC: 103 U/L (ref 55–135)
ALP SERPL-CCNC: 85 U/L (ref 55–135)
ALT SERPL W/O P-5'-P-CCNC: 10 U/L (ref 10–44)
ALT SERPL W/O P-5'-P-CCNC: 14 U/L (ref 10–44)
ANION GAP SERPL CALC-SCNC: 11 MMOL/L (ref 8–16)
ANION GAP SERPL CALC-SCNC: 8 MMOL/L (ref 8–16)
AST SERPL-CCNC: 12 U/L (ref 10–40)
AST SERPL-CCNC: 20 U/L (ref 10–40)
BASOPHILS # BLD AUTO: 0.06 K/UL (ref 0–0.2)
BASOPHILS NFR BLD: 0.4 % (ref 0–1.9)
BILIRUB SERPL-MCNC: 0.4 MG/DL (ref 0.1–1)
BILIRUB SERPL-MCNC: 0.4 MG/DL (ref 0.1–1)
BUN SERPL-MCNC: 25 MG/DL (ref 6–20)
BUN SERPL-MCNC: 28 MG/DL (ref 6–20)
CALCIUM SERPL-MCNC: 9.1 MG/DL (ref 8.7–10.5)
CALCIUM SERPL-MCNC: 9.3 MG/DL (ref 8.7–10.5)
CHLORIDE SERPL-SCNC: 103 MMOL/L (ref 95–110)
CHLORIDE SERPL-SCNC: 105 MMOL/L (ref 95–110)
CO2 SERPL-SCNC: 19 MMOL/L (ref 23–29)
CO2 SERPL-SCNC: 24 MMOL/L (ref 23–29)
CREAT SERPL-MCNC: 2.3 MG/DL (ref 0.5–1.4)
CREAT SERPL-MCNC: 3.1 MG/DL (ref 0.5–1.4)
CRP SERPL-MCNC: 114.9 MG/L (ref 0–8.2)
D DIMER PPP IA.FEU-MCNC: 1.24 MG/L FEU
DELSYS: ABNORMAL
DIFFERENTIAL METHOD: ABNORMAL
EOSINOPHIL # BLD AUTO: 0.2 K/UL (ref 0–0.5)
EOSINOPHIL NFR BLD: 1.2 % (ref 0–8)
EP: 5
ERYTHROCYTE [DISTWIDTH] IN BLOOD BY AUTOMATED COUNT: 13.9 % (ref 11.5–14.5)
ERYTHROCYTE [SEDIMENTATION RATE] IN BLOOD BY WESTERGREN METHOD: 18 MM/H
ERYTHROCYTE [SEDIMENTATION RATE] IN BLOOD BY WESTERGREN METHOD: 87 MM/HR (ref 0–36)
ERYTHROCYTE [SEDIMENTATION RATE] IN BLOOD BY WESTERGREN METHOD: 87 MM/HR (ref 0–36)
EST. GFR  (AFRICAN AMERICAN): 18.4 ML/MIN/1.73 M^2
EST. GFR  (AFRICAN AMERICAN): 26.4 ML/MIN/1.73 M^2
EST. GFR  (NON AFRICAN AMERICAN): 16 ML/MIN/1.73 M^2
EST. GFR  (NON AFRICAN AMERICAN): 22.9 ML/MIN/1.73 M^2
FIO2: 21
FIO2: 27
FLOW: 2
FLOW: 2
GLUCOSE SERPL-MCNC: 110 MG/DL (ref 70–110)
GLUCOSE SERPL-MCNC: 142 MG/DL (ref 70–110)
HCO3 UR-SCNC: 22.4 MMOL/L (ref 24–28)
HCO3 UR-SCNC: 24 MMOL/L (ref 24–28)
HCO3 UR-SCNC: 24.3 MMOL/L (ref 24–28)
HCT VFR BLD AUTO: 32.1 % (ref 37–48.5)
HCT VFR BLD CALC: 31 %PCV (ref 36–54)
HGB BLD-MCNC: 10.6 G/DL (ref 12–16)
IMM GRANULOCYTES # BLD AUTO: 0.07 K/UL (ref 0–0.04)
IMM GRANULOCYTES NFR BLD AUTO: 0.4 % (ref 0–0.5)
IP: 15
LACTATE SERPL-SCNC: 1.1 MMOL/L (ref 0.5–2.2)
LDH SERPL L TO P-CCNC: 0.46 MMOL/L (ref 0.36–1.25)
LYMPHOCYTES # BLD AUTO: 1.4 K/UL (ref 1–4.8)
LYMPHOCYTES NFR BLD: 8.9 % (ref 18–48)
MAGNESIUM SERPL-MCNC: 1.7 MG/DL (ref 1.6–2.6)
MCH RBC QN AUTO: 27.9 PG (ref 27–31)
MCHC RBC AUTO-ENTMCNC: 33 G/DL (ref 32–36)
MCV RBC AUTO: 85 FL (ref 82–98)
MIN VOL: 9.6
MODE: ABNORMAL
MONOCYTES # BLD AUTO: 1.3 K/UL (ref 0.3–1)
MONOCYTES NFR BLD: 8.5 % (ref 4–15)
NEUTROPHILS # BLD AUTO: 12.7 K/UL (ref 1.8–7.7)
NEUTROPHILS NFR BLD: 80.6 % (ref 38–73)
NRBC BLD-RTO: 0 /100 WBC
OSMOLALITY SERPL: 291 MOSM/KG (ref 275–295)
PCO2 BLDA: 47.8 MMHG (ref 35–45)
PCO2 BLDA: 52.5 MMHG (ref 35–45)
PCO2 BLDA: 55.3 MMHG (ref 35–45)
PH SMN: 7.24 [PH] (ref 7.35–7.45)
PH SMN: 7.25 [PH] (ref 7.35–7.45)
PH SMN: 7.31 [PH] (ref 7.35–7.45)
PLATELET # BLD AUTO: 122 K/UL (ref 150–350)
PMV BLD AUTO: 11.9 FL (ref 9.2–12.9)
PO2 BLDA: 61 MMHG (ref 80–100)
PO2 BLDA: 84 MMHG (ref 80–100)
PO2 BLDA: 92 MMHG (ref 80–100)
POC BE: -2 MMOL/L
POC BE: -3 MMOL/L
POC BE: -5 MMOL/L
POC IONIZED CALCIUM: 1.26 MMOL/L (ref 1.06–1.42)
POC SATURATED O2: 89 % (ref 95–100)
POC SATURATED O2: 94 % (ref 95–100)
POC SATURATED O2: 95 % (ref 95–100)
POC TCO2: 24 MMOL/L (ref 23–27)
POC TCO2: 26 MMOL/L (ref 23–27)
POC TCO2: 26 MMOL/L (ref 23–27)
POCT GLUCOSE: 146 MG/DL (ref 70–110)
POCT GLUCOSE: 152 MG/DL (ref 70–110)
POCT GLUCOSE: 219 MG/DL (ref 70–110)
POTASSIUM BLD-SCNC: 4.1 MMOL/L (ref 3.5–5.1)
POTASSIUM SERPL-SCNC: 4.2 MMOL/L (ref 3.5–5.1)
POTASSIUM SERPL-SCNC: 4.4 MMOL/L (ref 3.5–5.1)
PROT SERPL-MCNC: 6.6 G/DL (ref 6–8.4)
PROT SERPL-MCNC: 7.4 G/DL (ref 6–8.4)
RBC # BLD AUTO: 3.8 M/UL (ref 4–5.4)
SAMPLE: ABNORMAL
SITE: ABNORMAL
SODIUM BLD-SCNC: 134 MMOL/L (ref 136–145)
SODIUM SERPL-SCNC: 135 MMOL/L (ref 136–145)
SODIUM SERPL-SCNC: 135 MMOL/L (ref 136–145)
SP02: 85
SP02: 94
SP02: 94
SPONT RATE: 18
T4 FREE SERPL-MCNC: 1.13 NG/DL (ref 0.71–1.51)
TSH SERPL DL<=0.005 MIU/L-ACNC: 0.26 UIU/ML (ref 0.4–4)
WBC # BLD AUTO: 15.71 K/UL (ref 3.9–12.7)

## 2019-05-26 PROCEDURE — 99233 SBSQ HOSP IP/OBS HIGH 50: CPT | Mod: GC,,, | Performed by: HOSPITALIST

## 2019-05-26 PROCEDURE — 85652 RBC SED RATE AUTOMATED: CPT

## 2019-05-26 PROCEDURE — 83605 ASSAY OF LACTIC ACID: CPT

## 2019-05-26 PROCEDURE — 63600175 PHARM REV CODE 636 W HCPCS: Performed by: INTERNAL MEDICINE

## 2019-05-26 PROCEDURE — 99900035 HC TECH TIME PER 15 MIN (STAT)

## 2019-05-26 PROCEDURE — 11000001 HC ACUTE MED/SURG PRIVATE ROOM

## 2019-05-26 PROCEDURE — 63600175 PHARM REV CODE 636 W HCPCS: Performed by: HOSPITALIST

## 2019-05-26 PROCEDURE — 84439 ASSAY OF FREE THYROXINE: CPT

## 2019-05-26 PROCEDURE — 94761 N-INVAS EAR/PLS OXIMETRY MLT: CPT

## 2019-05-26 PROCEDURE — 94660 CPAP INITIATION&MGMT: CPT

## 2019-05-26 PROCEDURE — 85014 HEMATOCRIT: CPT

## 2019-05-26 PROCEDURE — 85379 FIBRIN DEGRADATION QUANT: CPT

## 2019-05-26 PROCEDURE — 93005 ELECTROCARDIOGRAM TRACING: CPT

## 2019-05-26 PROCEDURE — 93010 ELECTROCARDIOGRAM REPORT: CPT | Mod: ,,, | Performed by: INTERNAL MEDICINE

## 2019-05-26 PROCEDURE — 36415 COLL VENOUS BLD VENIPUNCTURE: CPT

## 2019-05-26 PROCEDURE — 84295 ASSAY OF SERUM SODIUM: CPT

## 2019-05-26 PROCEDURE — 84132 ASSAY OF SERUM POTASSIUM: CPT

## 2019-05-26 PROCEDURE — 83930 ASSAY OF BLOOD OSMOLALITY: CPT

## 2019-05-26 PROCEDURE — 80053 COMPREHEN METABOLIC PANEL: CPT | Mod: 91

## 2019-05-26 PROCEDURE — 25000003 PHARM REV CODE 250: Performed by: STUDENT IN AN ORGANIZED HEALTH CARE EDUCATION/TRAINING PROGRAM

## 2019-05-26 PROCEDURE — 83735 ASSAY OF MAGNESIUM: CPT

## 2019-05-26 PROCEDURE — 85025 COMPLETE CBC W/AUTO DIFF WBC: CPT

## 2019-05-26 PROCEDURE — 93010 EKG 12-LEAD: ICD-10-PCS | Mod: ,,, | Performed by: INTERNAL MEDICINE

## 2019-05-26 PROCEDURE — 27000190 HC CPAP FULL FACE MASK W/VALVE

## 2019-05-26 PROCEDURE — 82330 ASSAY OF CALCIUM: CPT

## 2019-05-26 PROCEDURE — 84443 ASSAY THYROID STIM HORMONE: CPT

## 2019-05-26 PROCEDURE — 86140 C-REACTIVE PROTEIN: CPT

## 2019-05-26 PROCEDURE — 99233 PR SUBSEQUENT HOSPITAL CARE,LEVL III: ICD-10-PCS | Mod: GC,,, | Performed by: HOSPITALIST

## 2019-05-26 PROCEDURE — 36600 WITHDRAWAL OF ARTERIAL BLOOD: CPT

## 2019-05-26 PROCEDURE — 82803 BLOOD GASES ANY COMBINATION: CPT

## 2019-05-26 PROCEDURE — 27000221 HC OXYGEN, UP TO 24 HOURS

## 2019-05-26 PROCEDURE — 25000003 PHARM REV CODE 250: Performed by: INTERNAL MEDICINE

## 2019-05-26 RX ORDER — HEPARIN SODIUM 5000 [USP'U]/ML
7500 INJECTION, SOLUTION INTRAVENOUS; SUBCUTANEOUS EVERY 8 HOURS
Status: DISCONTINUED | OUTPATIENT
Start: 2019-05-26 | End: 2019-05-30 | Stop reason: HOSPADM

## 2019-05-26 RX ORDER — CEFEPIME HYDROCHLORIDE 2 G/1
2 INJECTION, POWDER, FOR SOLUTION INTRAVENOUS EVERY 24 HOURS
Status: DISCONTINUED | OUTPATIENT
Start: 2019-05-26 | End: 2019-05-28

## 2019-05-26 RX ORDER — NALOXONE HCL 0.4 MG/ML
0.4 VIAL (ML) INJECTION
Status: DISCONTINUED | OUTPATIENT
Start: 2019-05-26 | End: 2019-05-26

## 2019-05-26 RX ORDER — HEPARIN SODIUM 5000 [USP'U]/ML
5000 INJECTION, SOLUTION INTRAVENOUS; SUBCUTANEOUS EVERY 8 HOURS
Status: DISCONTINUED | OUTPATIENT
Start: 2019-05-26 | End: 2019-05-26

## 2019-05-26 RX ORDER — NALOXONE HCL 0.4 MG/ML
VIAL (ML) INJECTION
Status: DISPENSED
Start: 2019-05-26 | End: 2019-05-26

## 2019-05-26 RX ORDER — NAPROXEN 500 MG/1
500 TABLET ORAL 2 TIMES DAILY
Status: ON HOLD | COMMUNITY
End: 2019-05-29 | Stop reason: HOSPADM

## 2019-05-26 RX ORDER — NALOXONE HCL 0.4 MG/ML
0.4 VIAL (ML) INJECTION
Status: DISCONTINUED | OUTPATIENT
Start: 2019-05-26 | End: 2019-05-30 | Stop reason: HOSPADM

## 2019-05-26 RX ADMIN — POTASSIUM CHLORIDE 20 MEQ: 1500 TABLET, EXTENDED RELEASE ORAL at 08:05

## 2019-05-26 RX ADMIN — NALOXONE HYDROCHLORIDE 0.4 MG: 0.4 INJECTION, SOLUTION INTRAMUSCULAR; INTRAVENOUS; SUBCUTANEOUS at 11:05

## 2019-05-26 RX ADMIN — CARVEDILOL 25 MG: 25 TABLET, FILM COATED ORAL at 07:05

## 2019-05-26 RX ADMIN — OXYCODONE HYDROCHLORIDE 10 MG: 10 TABLET ORAL at 07:05

## 2019-05-26 RX ADMIN — CEFEPIME 2 G: 2 INJECTION, POWDER, FOR SOLUTION INTRAVENOUS at 02:05

## 2019-05-26 RX ADMIN — LEVOTHYROXINE SODIUM 200 MCG: 100 TABLET ORAL at 08:05

## 2019-05-26 RX ADMIN — PANTOPRAZOLE SODIUM 40 MG: 40 TABLET, DELAYED RELEASE ORAL at 08:05

## 2019-05-26 RX ADMIN — ESCITALOPRAM OXALATE 10 MG: 5 TABLET, FILM COATED ORAL at 08:05

## 2019-05-26 RX ADMIN — OXYCODONE HYDROCHLORIDE 10 MG: 10 TABLET ORAL at 02:05

## 2019-05-26 RX ADMIN — VANCOMYCIN HYDROCHLORIDE 2250 MG: 750 INJECTION, POWDER, LYOPHILIZED, FOR SOLUTION INTRAVENOUS at 02:05

## 2019-05-26 RX ADMIN — DIPHENHYDRAMINE HYDROCHLORIDE 50 MG: 50 CAPSULE ORAL at 08:05

## 2019-05-26 RX ADMIN — ATORVASTATIN CALCIUM 10 MG: 10 TABLET, FILM COATED ORAL at 08:05

## 2019-05-26 RX ADMIN — AMLODIPINE BESYLATE 10 MG: 10 TABLET ORAL at 08:05

## 2019-05-26 RX ADMIN — DIPHENHYDRAMINE HYDROCHLORIDE 50 MG: 50 CAPSULE ORAL at 02:05

## 2019-05-26 RX ADMIN — VITAMIN D, TAB 1000IU (100/BT) 1000 UNITS: 25 TAB at 08:05

## 2019-05-26 RX ADMIN — ACETAMINOPHEN 650 MG: 325 TABLET ORAL at 07:05

## 2019-05-26 RX ADMIN — CARVEDILOL 25 MG: 25 TABLET, FILM COATED ORAL at 04:05

## 2019-05-26 RX ADMIN — HEPARIN SODIUM 7500 UNITS: 5000 INJECTION, SOLUTION INTRAVENOUS; SUBCUTANEOUS at 10:05

## 2019-05-26 RX ADMIN — HEPARIN SODIUM 7500 UNITS: 5000 INJECTION, SOLUTION INTRAVENOUS; SUBCUTANEOUS at 02:05

## 2019-05-26 NOTE — HPI
Ms. Quezada is a 58yo woman with HFpEF, CKD3, DM2 not taking insulin at home with peripheral neuropathy, HTN, HLD, hypothyroidism (s/p thyroidectomy), GERD, hx of CVA (no residual deficits), hx of HCV s/p Harvoni (2015) with reported undetectable VL, and chronic back pain 2/2 T12 fracture who presents following a mechanical fall at home, found to have acute L2 compression fracture with small bilateral epidural hematoma. Initially with bilateral lower extremity weakness and concern for urinary retention s/p mera placement in ED. MRI and CT imaging obtained; per NSGY low suspicion for compressive polyradiculopathy. TLSO brace in place.    Critical care consulted following a rapid response that was called for altered mental status, hypotension and hypoventilation. Pt received 30mg oxycodone in 9 hours. Pt also has TLSO brace, is obese with probable SERA.

## 2019-05-26 NOTE — SIGNIFICANT EVENT
We were called to bedside to assess patient who was found to be unresponsive after coming back up from ultrasound. Rapid response had been called. ABG showed pH 7.24, CO2 55, pO2 94. ICU was called to bedside to assess patient. Patient was placed on BiPAP for hypercarbic respiratory failure. She had been receiving narcotics due to back pain from acute L2 fracture. As such, we gave the patient 1 dose of narcan after which patient became more responsive. IV access was lost temporarily after narcan dose was given. After regaining access, she was given additional dose. Repeat ABG after 1 hour showed pH 7.31, CO2 47, and O2 89%. Upon re-assessing patient after 1 hour, she is still drowsy but more alert. Decision was made for keep patient on the floor on BIPAP. Narcan prn remains in MAR.    Ramone Blanco DO  PGY3 Internal Medicine  Pager: [phone]

## 2019-05-26 NOTE — PROGRESS NOTES
"[facility] Hospital Medicine  Progress Note    Patient Name: Kathie Quezada  MRN: [id number]  Patient Class: IP- Inpatient   Admission Date: 5/23/2019  Length of Stay: 2 days  Attending Physician: Frank Brand, *  Primary Care Provider: Elan Price MD    [facility] Medicine Team: [facility] HOSP MED 4 Mita Ceron MD    Subjective:     Principal Problem:Closed compression fracture of L2 lumbar vertebra    HPI:  Kathie Quezada is a 58 y.o. woman with a history of HFpEF (EF: 60% 1/19), CKD Stage G3a/A3 c/b Nephrotic Range Proteinuria, DM2 (A1c: 5.2%) not taking Insulin at home, HTN, HLD, Hypothyroidism (s/p Thyroidectomy), GERD, Hx of CVA (no residual deficits) and Hx HCV s/p Harvoni (2015) with reported undetectable VL, and chronic back pain 2/2 T12 fracture who presents to [facility] ED via EMS after a fall at home. Pt reports picking up her grand-daughter, becoming unbalanced, and falling on her back. She denies any dizziness, lightheadedness, or other pre-syncopal symptoms prior to the fall. She also denies any head trauma. However, she notes a "crack" upon her impact with the ground in addition to the onset of pain. She also had "no feeling in my legs" and experienced a loss of sensation until EMS arrived at the scene. Pt denies any recent exacerbations related to her chronic co-morbidities. Her baseline back pain is about a 5/10 and she takes no medication for it; her T12 fracture occurred approx 3 years ago. She denies any recent exacerbations in terms of SOB, leg swelling, and BP control. She further denies any F/C, N/V, diarrhea, constipation, dysuria, CP, palpitations.     While in the ED, Pt was reported to complain of urinary retention. A bladder scan revealed >1L and Pt had UOP of 1200cc w/ insertion of a urinary catheter. Pt is uncertain if it was her pain that limited her ability to urinate. She has not had issues with urinary retention, incontinence, decreased stream, " straining to urinate previously.     Hospital Course:  Admitted from the ED to  on 5/24 in setting of urinary retention s/p mechanical fall resulting in L2 compression fracture with associated hematoma. Per NSGY, no acute intervention needed and Pt to f/u out-pt. Pt admitted with neurochecks and to assess resolution of urinary retention following pain control.     Interval History:   Febrile again overnight. Required mera replacement due to continued retention.   This AM appears drowsy and diaphoretic, however, awakens to voice and answers questions appropriately.     Review of Systems   Constitutional: Positive for diaphoresis and fever. Negative for appetite change and chills.   HENT: Negative for congestion, rhinorrhea, sneezing and sore throat.    Eyes: Negative for visual disturbance.   Respiratory: Negative for cough, shortness of breath and wheezing.    Cardiovascular: Negative for chest pain, palpitations and leg swelling.   Gastrointestinal: Negative for abdominal pain, blood in stool, constipation, diarrhea, nausea and vomiting.   Genitourinary: Positive for flank pain. Negative for dysuria.   Musculoskeletal: Positive for back pain.   Skin: Negative for rash and wound.   Neurological: Negative for dizziness and light-headedness.     Objective:     Vital Signs (Most Recent):  Temp: 98.1 °F (36.7 °C) (05/26/19 1459)  Pulse: 72 (05/26/19 1510)  Resp: 18 (05/26/19 1510)  BP: 129/61 (05/26/19 1459)  SpO2: 98 % (05/26/19 1510) Vital Signs (24h Range):  Temp:  [97.6 °F (36.4 °C)-103 °F (39.4 °C)] 98.1 °F (36.7 °C)  Pulse:  [] 72  Resp:  [16-20] 18  SpO2:  [91 %-98 %] 98 %  BP: ()/(53-85) 129/61     Weight: 113.4 kg (250 lb)  Body mass index is 45.73 kg/m².    Intake/Output Summary (Last 24 hours) at 5/26/2019 1540  Last data filed at 5/26/2019 0600  Gross per 24 hour   Intake 580 ml   Output 850 ml   Net -270 ml      Physical Exam   Constitutional: She is oriented to person, place, and time. She  appears lethargic. No distress.   Pt in brace upon exam   HENT:   Head: Normocephalic and atraumatic.   Right Ear: External ear normal.   Left Ear: External ear normal.   Mouth/Throat: Oropharynx is clear and moist. No oropharyngeal exudate.   Eyes: Pupils are equal, round, and reactive to light. Conjunctivae and EOM are normal. No scleral icterus.   Neck: Normal range of motion. Neck supple. No JVD present. No thyromegaly present.   Cardiovascular: Normal rate, regular rhythm, normal heart sounds and intact distal pulses. Exam reveals no gallop and no friction rub.   No murmur heard.  Pulmonary/Chest: Effort normal and breath sounds normal. No respiratory distress. She has no wheezes. She exhibits no tenderness.   Abdominal: Soft. Bowel sounds are normal. There is no tenderness. There is no guarding.   Genitourinary:   Genitourinary Comments: Sheth in place   Musculoskeletal: She exhibits tenderness (flank and back). She exhibits no edema.   Lymphadenopathy:     She has no cervical adenopathy.   Neurological: She is oriented to person, place, and time. She appears lethargic. No sensory deficit.   BL UE strength 5/5  BL LE strength 4/5   Skin: Skin is warm. No rash noted. She is diaphoretic.   Vitals reviewed.      Significant Labs:   Blood Culture:   Recent Labs   Lab 05/25/19  0710   LABBLOO No Growth to date  No Growth to date  No Growth to date  No Growth to date     CBC:   Recent Labs   Lab 05/25/19  0348 05/26/19  0710 05/26/19  1118   WBC 13.91* 15.71*  --    HGB 10.6* 10.6*  --    HCT 33.2* 32.1* 31*    122*  --      CMP:   Recent Labs   Lab 05/25/19  0348 05/26/19  0350 05/26/19  1347    135* 135*   K 3.7 4.4 4.2    105 103   CO2 21* 19* 24   GLU 86 110 142*   BUN 21* 25* 28*   CREATININE 2.0* 2.3* 3.1*   CALCIUM 8.4* 9.3 9.1   PROT 6.6 7.4 6.6   ALBUMIN 2.7* 2.7* 2.4*   BILITOT 0.5 0.4 0.4   ALKPHOS 107 103 85   AST 22 20 12   ALT 19 14 10   ANIONGAP 10 11 8   EGFRNONAA 27.1* 22.9*  "16.0*     Urine Culture: No results for input(s): LABURIN in the last 48 hours.  Urine Studies:   Recent Labs   Lab 05/25/19  1239   COLORU Yellow   APPEARANCEUA Clear   PHUR 5.0   SPECGRAV 1.010   PROTEINUA 2+*   GLUCUA 1+*   KETONESU Negative   BILIRUBINUA Negative   OCCULTUA 2+*   NITRITE Negative   LEUKOCYTESUR Trace*   RBCUA 3   WBCUA 7*   BACTERIA Rare   SQUAMEPITHEL 0   HYALINECASTS 4*       Significant Imaging: I have reviewed all pertinent imaging results/findings within the past 24 hours.     Assessment/Plan:      * Closed compression fracture of L2 lumbar vertebra  60 yo female s/p mechanical fall with superior L2 compression fracture.   Small bilateral EDH on MRI adjacent to pedicles at L2 with mild thecal sac compression.  L-spine Ct w/ stable superior endplate compression fracture  Per NSGY,    No acute neurosurgical intervention   TLSO brace when out of bed more than 5 minutes or prn for comfort while in bed   Will arrange for pt to follow up in six weeks in outpatient clinic with interval plain films to confirm healing fracture   Cleared for discharge per neurosurgical perspective upon completion of baseline plain films  Non-narcotic Pain control  Previous DEXA w/ osteopenia; initiating Ca and Vit supplements        Acute urinary retention  60 yo female s/p mechanical fall with superior L2 compression fracture.  After presentation to ED, c/o of urinary retnetion w/ bladder scan significant for >1L retained urine and UOP w/ mera approx 1200cc.  Pt denies previous issue with urinary of bowel incontinence or retention.   Potential etiologies of acute retention include spinal compression, pain, pain medication received upon admission, UTI.   Per NSGY, "degree of canal compression not typical for development of acute severe polyradiculopathy."    UTI negative for infection.   Will remove mera and attempt voiding trial s/p acute status  Renal U/S w/o acute process  Continue non-narcotic pain " control  Neuro-checks q4 hours.    Acute hypercapnic respiratory failure  RR called on Pt 5/26 in setting of AMS w/ increased lethargy and concern for airway protection.   Pt with hx of brain bleed, ESRD, fevers. Pt received 30mg oxycodone in 9 hours. GCS initially 4, improved to 14 after narcan administration and jaw thrust. Pt requiring repeat PRN doses of narcan.      -etiology likely combo of C02 retention in setting of brace and pain inhibiting full respirations and pain medication administration in setting renal injury    -contact MICU if not requiring more doses of narcan, will consider narcan drip if needed.   -low threshold to admit to MICU if somnolence and narcan necessity continues.  -d/c narcotics   AMS resolving s/p narcan and BiPAP, continue to monitor closely and continue BiPAP with backup rate to ensure pt does not become apneic. Goal sats 88-92%. ABGs improving; monitor q4hrs.     Altered mental status  RR called on Pt 5/26 in setting of AMS w/ increased lethargy and concern for airway protection.   Pt with hx of brain bleed, ESRD, fevers. Pt received 30mg oxycodone in 9 hours. GCS initially 4, improved to 14 after narcan administration and jaw thrust. Pt requiring repeat PRN doses of narcan.      -etiology likely combo of C02 retention in setting of brace and pain inhibiting full respirations and pain medication administration in setting renal injury    -contact MICU if not requiring more doses of narcan, will consider narcan drip if needed.   -low threshold to admit to MICU if somnolence and narcan necessity continues.  -d/c narcotics   AMS resolving s/p narcan and BiPAP, continue to monitor closely      Fever  Remains febrile intermittently   WCC increased to 15.71  Suspect more inflammatory reaction in setting of fracture vs infectious etiology  CXR negative for acute process, UA w/o infection, blood cx pending but low suspicion for +  Abx started today following rapid response during which  there was concern for hypotension, which was likely due to cuff misplacement  However, due to ongoing fevers w/o source, will cover broadly with renally dosed vanc and cefepime    Hypokalemia  Admit K 3.1  Likely due to diuretic use  Replete and monitor      GERD (gastroesophageal reflux disease)  - Protonix 40 mg daily      Hypothyroidism  - Synthroid 200 mcg daily  - Recent TSH/FT4 wnl's    Factor XI deficiency  Initially holding lovenox and SCDs for DVT ppx  However, in setting of ongoing fevers concern for embolus  BL LE U/S negative  D-dimer 1.24, unable to pursue CTA and V/Q scan at this time due to ARUNA and Pt status respectively   Will start heparin 7500U q8 hours      (HFpEF) heart failure with preserved ejection fraction  Pt euvolemic on exam.   No c/o of SOB, CP, palpitations.   Holding home lasix 40mg BID in setting of potential ARUNA.       CKD stage G3b/A3, GFR 30-44 and albumin creatinine ratio >300 mg/g  Previous extensive CKD workup indicates that HTN and DM2 etiologies of CKD (JB, cryoglobulins, SPEP/UPEP, ANCA's, and complement all wnl's)  At baseline Cr upon admit, however slight 0.3 bump 5/25, and further increase to 3.1  Holding lasix, urine lytes pending  Continue to monitor RFTs  Avoid nephrotoxic agents and renally dose medications    Hyperlipidemia  Continue home statin  Previous CVA hx, no residual side effects      HTN (hypertension)  On arrival Pt w/ BP 170s/80s. Per Pt, baseline on home medication closer to SBPs 130s-140s.  Home medications include: amlodipine 10mg qd, coreg 25mg bid, lisinopril 20mg qd. Also hydralazine 25mg for SBP>160.  Present values likely due to acute fracture pain. Will optimize pain regimen.   Continue home medications; titrate as necessary.        DM2 (diabetes mellitus, type 2)  -low dose SSI, not taking Insulin at home and most recent A1c at goal  -diabetic diet        VTE Risk Mitigation (From admission, onward)        Ordered     heparin (porcine) injection  7,500 Units  Every 8 hours      05/26/19 1338     IP VTE HIGH RISK PATIENT  Once      05/25/19 0553     Place EMILIA hose  Until discontinued      05/25/19 0553     Place sequential compression device  Until discontinued      05/24/19 0174              Mita Ceron MD   IM, PGY-1  Department of Hospital Medicine   [facility]

## 2019-05-26 NOTE — ASSESSMENT & PLAN NOTE
Initially holding lovenox and SCDs for DVT ppx  However, in setting of ongoing fevers concern for embolus  BL LE U/S negative  D-dimer 1.24, unable to pursue CTA and V/Q scan at this time due to ARUNA and Pt status respectively   Will start heparin 7500U q8 hours

## 2019-05-26 NOTE — SUBJECTIVE & OBJECTIVE
Past Medical History:   Diagnosis Date    CHF (congestive heart failure)     CKD stage G3a/A3, GFR 45-59 and albumin creatinine ratio >300 mg/g 5/16/2018    CVA (cerebral vascular accident) 2017    Essential hypertension 4/9/2018    Factor XI deficiency 1978    GIB (gastrointestinal bleeding) 10/18/2018    History of hepatitis C     s/p succesful Rx w/ Harvoni by Dr Herbert  - SVR12 (cure) 2016    Hyperlipidemia     Mixed hyperlipidemia 4/9/2018    Postoperative hypothyroidism     Proteinuria 4/9/2018    Type 2 diabetes mellitus with circulatory disorder, with long-term current use of insulin 4/9/2018       Past Surgical History:   Procedure Laterality Date    BACK SURGERY      COLONOSCOPY  11/13/2015    Dr Chino torres prep. internal hemorrhoids. diverticulosis of sigmois. NO polyps. repeat due 2025    galbladder      HYSTERECTOMY      OOPHORECTOMY      one    TONSILLECTOMY         Review of patient's allergies indicates:   Allergen Reactions    Bactrim [sulfamethoxazole-trimethoprim] Hives    Codeine Hives    Penicillins Hives    Shellfish containing products Itching    Sulfa (sulfonamide antibiotics) Hives       Family History     Problem Relation (Age of Onset)    Diabetes type II Mother        Tobacco Use    Smoking status: Never Smoker    Smokeless tobacco: Never Used   Substance and Sexual Activity    Alcohol use: Yes     Alcohol/week: 2.4 oz     Types: 4 Cans of beer per week     Comment: weekly    Drug use: No    Sexual activity: Not on file      Review of Systems   Unable to perform ROS: Acuity of condition     Objective:     Vital Signs (Most Recent):  Temp: 97.6 °F (36.4 °C) (05/26/19 1110)  Pulse: 81 (05/26/19 1215)  Resp: 20 (05/26/19 1215)  BP: (!) 150/67 (05/26/19 1215)  SpO2: (!) 92 % (05/26/19 1215) Vital Signs (24h Range):  Temp:  [97.6 °F (36.4 °C)-103 °F (39.4 °C)] 97.6 °F (36.4 °C)  Pulse:  [] 81  Resp:  [16-20] 20  SpO2:  [91 %-95 %] 92 %  BP:  ()/(53-85) 150/67   Weight: 113.4 kg (250 lb)  Body mass index is 45.73 kg/m².      Intake/Output Summary (Last 24 hours) at 5/26/2019 1344  Last data filed at 5/26/2019 0600  Gross per 24 hour   Intake 580 ml   Output 850 ml   Net -270 ml       Physical Exam   Constitutional: She appears well-developed and well-nourished.   Morbidly obese, AA female   HENT:   Head: Normocephalic and atraumatic.   Eyes:   Pupils 2mm prior to narcan, 3mm post narcan   Neck: Neck supple. No tracheal deviation present.   Cardiovascular: Normal rate, regular rhythm and normal heart sounds.   Pulmonary/Chest:   Pt with snoring respirations and minimal air movement prior to jaw thrust. TLSO brace in place   Abdominal: Soft. She exhibits no distension.   Musculoskeletal: She exhibits no edema.   Neurological:   Pt initially only responsive to sternal rub. After narcan and jaw thrust was able to respond appropriately to questions.   Skin: Skin is warm. She is diaphoretic.   Nursing note and vitals reviewed.      Vents:     Lines/Drains/Airways     Drain                 Urethral Catheter 05/25/19 1955 16 Fr. less than 1 day          Peripheral Intravenous Line                 Peripheral IV - Single Lumen 05/23/19 1906 22 G Left Hand 2 days              Significant Labs:    CBC/Anemia Profile:  Recent Labs   Lab 05/25/19  0348 05/26/19  0710 05/26/19  1118   WBC 13.91* 15.71*  --    HGB 10.6* 10.6*  --    HCT 33.2* 32.1* 31*    122*  --    MCV 87 85  --    RDW 14.6* 13.9  --         Chemistries:  Recent Labs   Lab 05/25/19  0348 05/26/19  0350    135*   K 3.7 4.4    105   CO2 21* 19*   BUN 21* 25*   CREATININE 2.0* 2.3*   CALCIUM 8.4* 9.3   ALBUMIN 2.7* 2.7*   PROT 6.6 7.4   BILITOT 0.5 0.4   ALKPHOS 107 103   ALT 19 14   AST 22 20   MG 1.6 1.7       CMP:   Recent Labs   Lab 05/25/19  0348 05/26/19  0350    135*   K 3.7 4.4    105   CO2 21* 19*   GLU 86 110   BUN 21* 25*   CREATININE 2.0* 2.3*   CALCIUM  8.4* 9.3   PROT 6.6 7.4   ALBUMIN 2.7* 2.7*   BILITOT 0.5 0.4   ALKPHOS 107 103   AST 22 20   ALT 19 14   ANIONGAP 10 11   EGFRNONAA 27.1* 22.9*     All pertinent labs within the past 24 hours have been reviewed.    Significant Imaging: I have reviewed all pertinent imaging results/findings within the past 24 hours.

## 2019-05-26 NOTE — PROGRESS NOTES
Pt returned form U/S via bed--unresponsive to voice, to sternal rub, saliva on lips, skin cool and clamy. Vital signs as noted. Rapid response called and came to bedside in 3 min. IM 4 team called and came to bedside. Charge nurse and House supervisor at bedside. EKG and ABG's done. New orders noted and implemented.

## 2019-05-26 NOTE — ASSESSMENT & PLAN NOTE
Previous extensive CKD workup indicates that HTN and DM2 etiologies of CKD (JB, cryoglobulins, SPEP/UPEP, ANCA's, and complement all wnl's)  At baseline Cr upon admit, however slight 0.3 bump 5/25, and further increase to 3.1  Holding lasix, urine lytes pending  Continue to monitor RFTs  Avoid nephrotoxic agents and renally dose medications

## 2019-05-26 NOTE — NURSING
Removed mera today at 12 noon. Pt has not voided and does not have the urge to void. Bladder scan reveals 582 ml. Notified on call MD.

## 2019-05-26 NOTE — PROGRESS NOTES
"RAPID RESPONSE NURSE NOTE     Admit Date: 2019  LOS: 2  Code Status: Full Code   Date of Consult: 2019  : 1960  Age: 59 y.o.  Weight:   Wt Readings from Last 1 Encounters:   19 113.4 kg (250 lb)     Sex: female  Race: Black or    Bed: [facility]:   MRN: [id number]  Time Rapid Response Team page Received: 1100   Time Rapid Response Team at Bedside: 1105  Time Rapid Response Team left Bedside: 1215   Was the patient discharged from an ICU this admission?   no  Was the patient discharged from a PACU within last 24 hours?  yes  Did the patient receive conscious sedation/general anesthesia within last 24 hours?  yes  Was the patient in the ED within the past 24 hours?  yes  Was the patient started on NIPPV within the past 24 hours?  yes  Did this progress into an ARC or CPA:  no  Attending Physician: Frank Brand, *  Primary Service: [facility] 4  Consult Requested By: Frank Brand, *     SITUATION     Reason for Call:   Called to evaluate the patient for Neuro, hypotension, and diaphoresis.     BACKGROUND     Why is the patient in the hospital?: Closed compression fracture of L2 lumbar vertebra    Patient has a past medical history of CHF (congestive heart failure), CKD stage G3a/A3, GFR 45-59 and albumin creatinine ratio >300 mg/g, CVA (cerebral vascular accident), Essential hypertension, Factor XI deficiency, GIB (gastrointestinal bleeding), History of hepatitis C, Hyperlipidemia, Mixed hyperlipidemia, Postoperative hypothyroidism, Proteinuria, and Type 2 diabetes mellitus with circulatory disorder, with long-term current use of insulin.    ASSESSMENT/INTERVENTIONS     BP (!) 92/53   Pulse 74   Temp 97.6 °F (36.4 °C) (Axillary)   Resp 20   Ht 5' 2" (1.575 m)   Wt 113.4 kg (250 lb)   SpO2 (!) 91%   Breastfeeding? No   BMI 45.73 kg/m²     What did you find: Upon arrival, the patient was less responsive than previously per charge and bedside RN. Bedside RN " stated the patient returned from an ultrasound and was not responding. BG was 219. BP: 92/53. ABG was ordered and revealed respiratory acidosis. Critical Care team was at bedside. Narcan ordered and one dose given. BP was 177/128 post Narcan administration. Patient responded post administration. IV access lost and primary team attempting to acquire IV access. One  IV obtained. Patient to remain in the room per Critical Care team. ABG in one hour and closer neuro monitoring.     RECOMMENDATIONS     We recommend: closer Neuro monitoring and bipap. ABG to be performed in an hour.     FOLLOW-UP/CONTINGENCY PLAN     Patient needs a second visit at : 1500    Call the Rapid Response Nurse, Varun Yen RN at x [phone] for additional questions or concerns.    PHYSICIAN ESCALATION     Orders received and case discussed with Md Sunday.     Disposition: Remain in room 543B.

## 2019-05-26 NOTE — ASSESSMENT & PLAN NOTE
Remains febrile intermittently   WCC increased to 15.71  Suspect more inflammatory reaction in setting of fracture vs infectious etiology  CXR negative for acute process, UA w/o infection, blood cx pending but low suspicion for +  Abx started today following rapid response during which there was concern for hypotension, which was likely due to cuff misplacement  However, due to ongoing fevers w/o source, will cover broadly with renally dosed vanc and cefepime

## 2019-05-26 NOTE — PROGRESS NOTES
Rapid Response Nurse Follow-up Note     Followed up with patient for proactive rounding.   No acute issues at this time. Patient more alert than during rapid. ABG being drawn. Reviewed plan of care with primary RN, Vicenta.   Please call Rapid Response RN, Varun Yen RN with any questions or concerns at [phone].

## 2019-05-26 NOTE — ASSESSMENT & PLAN NOTE
RR called on Pt 5/26 in setting of AMS w/ increased lethargy and concern for airway protection.   Pt with hx of brain bleed, ESRD, fevers. Pt received 30mg oxycodone in 9 hours. GCS initially 4, improved to 14 after narcan administration and jaw thrust. Pt requiring repeat PRN doses of narcan.      -etiology likely combo of C02 retention in setting of brace and pain inhibiting full respirations and pain medication administration in setting renal injury    -contact MICU if not requiring more doses of narcan, will consider narcan drip if needed.   -low threshold to admit to MICU if somnolence and narcan necessity continues.  -d/c narcotics   AMS resolving s/p narcan and BiPAP, continue to monitor closely

## 2019-05-26 NOTE — ASSESSMENT & PLAN NOTE
"58 yo female s/p mechanical fall with superior L2 compression fracture.  After presentation to ED, c/o of urinary retnetion w/ bladder scan significant for >1L retained urine and UOP w/ mera approx 1200cc.  Pt denies previous issue with urinary of bowel incontinence or retention.   Potential etiologies of acute retention include spinal compression, pain, pain medication received upon admission, UTI.   Per NSGY, "degree of canal compression not typical for development of acute severe polyradiculopathy."    UTI negative for infection.   Will remove mera and attempt voiding trial s/p acute status  Renal U/S w/o acute process  Continue non-narcotic pain control  Neuro-checks q4 hours.  "

## 2019-05-26 NOTE — ASSESSMENT & PLAN NOTE
58 yo female s/p mechanical fall with superior L2 compression fracture.   Small bilateral EDH on MRI adjacent to pedicles at L2 with mild thecal sac compression.  L-spine Ct w/ stable superior endplate compression fracture  Per NSGY,    No acute neurosurgical intervention   TLSO brace when out of bed more than 5 minutes or prn for comfort while in bed   Will arrange for pt to follow up in six weeks in outpatient clinic with interval plain films to confirm healing fracture   Cleared for discharge per neurosurgical perspective upon completion of baseline plain films  Non-narcotic Pain control  Previous DEXA w/ osteopenia; initiating Ca and Vit supplements

## 2019-05-26 NOTE — CONSULTS
[facility]  Critical Care Medicine  Consult Note    Patient Name: Kathie Quezada  MRN: [id number]  Admission Date: 5/23/2019  Hospital Length of Stay: 2 days  Code Status: Full Code  Attending Physician: Frank Brand, *   Primary Care Provider: Elan Price MD   Principal Problem: Closed compression fracture of L2 lumbar vertebra    Inpatient consult to Critical Care Medicine  Consult performed by: Kamala Alvarez MD  Consult ordered by: Mita Ceron MD        Subjective:     HPI:  Ms. Quezada is a 58yo woman with HFpEF, CKD3, DM2 not taking insulin at home with peripheral neuropathy, HTN, HLD, hypothyroidism (s/p thyroidectomy), GERD, hx of CVA (no residual deficits), hx of HCV s/p Harvoni (2015) with reported undetectable VL, and chronic back pain 2/2 T12 fracture who presents following a mechanical fall at home, found to have acute L2 compression fracture with small bilateral epidural hematoma. Initially with bilateral lower extremity weakness and concern for urinary retention s/p mera placement in ED. MRI and CT imaging obtained; per NSGY low suspicion for compressive polyradiculopathy. TLSO brace in place.    Critical care consulted following a rapid response that was called for altered mental status, hypotension and hypoventilation. Pt received 30mg oxycodone in 9 hours. Pt also has TLSO brace, is obese with probable SERA.     Hospital/ICU Course:  Pt placed on BiPAP with PRN pushes of narcan for narcosis.     Past Medical History:   Diagnosis Date    CHF (congestive heart failure)     CKD stage G3a/A3, GFR 45-59 and albumin creatinine ratio >300 mg/g 5/16/2018    CVA (cerebral vascular accident) 2017    Essential hypertension 4/9/2018    Factor XI deficiency 1978    GIB (gastrointestinal bleeding) 10/18/2018    History of hepatitis C     s/p succesful Rx w/ Harvoni by Dr Herbert  - SVR12 (cure) 2016    Hyperlipidemia     Mixed hyperlipidemia 4/9/2018     Postoperative hypothyroidism     Proteinuria 4/9/2018    Type 2 diabetes mellitus with circulatory disorder, with long-term current use of insulin 4/9/2018       Past Surgical History:   Procedure Laterality Date    BACK SURGERY      COLONOSCOPY  11/13/2015    Dr Chino torres prep. internal hemorrhoids. diverticulosis of sigmois. NO polyps. repeat due 2025    galbladder      HYSTERECTOMY      OOPHORECTOMY      one    TONSILLECTOMY         Review of patient's allergies indicates:   Allergen Reactions    Bactrim [sulfamethoxazole-trimethoprim] Hives    Codeine Hives    Penicillins Hives    Shellfish containing products Itching    Sulfa (sulfonamide antibiotics) Hives       Family History     Problem Relation (Age of Onset)    Diabetes type II Mother        Tobacco Use    Smoking status: Never Smoker    Smokeless tobacco: Never Used   Substance and Sexual Activity    Alcohol use: Yes     Alcohol/week: 2.4 oz     Types: 4 Cans of beer per week     Comment: weekly    Drug use: No    Sexual activity: Not on file      Review of Systems   Unable to perform ROS: Acuity of condition     Objective:     Vital Signs (Most Recent):  Temp: 97.6 °F (36.4 °C) (05/26/19 1110)  Pulse: 81 (05/26/19 1215)  Resp: 20 (05/26/19 1215)  BP: (!) 150/67 (05/26/19 1215)  SpO2: (!) 92 % (05/26/19 1215) Vital Signs (24h Range):  Temp:  [97.6 °F (36.4 °C)-103 °F (39.4 °C)] 97.6 °F (36.4 °C)  Pulse:  [] 81  Resp:  [16-20] 20  SpO2:  [91 %-95 %] 92 %  BP: ()/(53-85) 150/67   Weight: 113.4 kg (250 lb)  Body mass index is 45.73 kg/m².      Intake/Output Summary (Last 24 hours) at 5/26/2019 1344  Last data filed at 5/26/2019 0600  Gross per 24 hour   Intake 580 ml   Output 850 ml   Net -270 ml       Physical Exam   Constitutional: She appears well-developed and well-nourished.   Morbidly obese, AA female   HENT:   Head: Normocephalic and atraumatic.   Eyes:   Pupils 2mm prior to narcan, 3mm post narcan   Neck: Neck  supple. No tracheal deviation present.   Cardiovascular: Normal rate, regular rhythm and normal heart sounds.   Pulmonary/Chest:   Pt with snoring respirations and minimal air movement prior to jaw thrust. TLSO brace in place   Abdominal: Soft. She exhibits no distension.   Musculoskeletal: She exhibits no edema.   Neurological:   Pt initially only responsive to sternal rub. After narcan and jaw thrust was able to respond appropriately to questions.   Skin: Skin is warm. She is diaphoretic.   Nursing note and vitals reviewed.      Vents:     Lines/Drains/Airways     Drain                 Urethral Catheter 05/25/19 1955 16 Fr. less than 1 day          Peripheral Intravenous Line                 Peripheral IV - Single Lumen 05/23/19 1906 22 G Left Hand 2 days              Significant Labs:    CBC/Anemia Profile:  Recent Labs   Lab 05/25/19  0348 05/26/19  0710 05/26/19  1118   WBC 13.91* 15.71*  --    HGB 10.6* 10.6*  --    HCT 33.2* 32.1* 31*    122*  --    MCV 87 85  --    RDW 14.6* 13.9  --         Chemistries:  Recent Labs   Lab 05/25/19  0348 05/26/19  0350    135*   K 3.7 4.4    105   CO2 21* 19*   BUN 21* 25*   CREATININE 2.0* 2.3*   CALCIUM 8.4* 9.3   ALBUMIN 2.7* 2.7*   PROT 6.6 7.4   BILITOT 0.5 0.4   ALKPHOS 107 103   ALT 19 14   AST 22 20   MG 1.6 1.7       CMP:   Recent Labs   Lab 05/25/19  0348 05/26/19  0350    135*   K 3.7 4.4    105   CO2 21* 19*   GLU 86 110   BUN 21* 25*   CREATININE 2.0* 2.3*   CALCIUM 8.4* 9.3   PROT 6.6 7.4   ALBUMIN 2.7* 2.7*   BILITOT 0.5 0.4   ALKPHOS 107 103   AST 22 20   ALT 19 14   ANIONGAP 10 11   EGFRNONAA 27.1* 22.9*     All pertinent labs within the past 24 hours have been reviewed.    Significant Imaging: I have reviewed all pertinent imaging results/findings within the past 24 hours.      ABG  Recent Labs   Lab 05/26/19  1252   PH 7.314*   PO2 61*   PCO2 47.8*   HCO3 24.3   BE -2     Assessment/Plan:     Neuro  Altered mental status  Pt  with hx of brain bleed, ESRD, fevers. Pt received 30mg oxycodone in 9 hours. GCS initially 4, improved to 14 after narcan administration and jaw thrust. Pt requiring repeat PRN doses of narcan.  -primary team advised to contact MICU if not requiring more doses of narcan, will consider narcan drip if needed.   -low threshold to admit to MICU if somnolence and narcan necessity continues.  -d/c narcotics    Pulmonary  Acute hypercapnic respiratory failure  Pt with probable SERA, snoring respirations and hypercapneic blood gas in setting of narcotic administration in ESRD. Improved mentation, ventilation and PCO2 with jaw thrust and narcan administration.  -continue BiPAP with backup rate to ensure pt does not become apneic.  -recommend continuous capnography and pulse oximetry.   -ABG q4 for now  -Goal sats 88-92%        Critical Care Daily Checklist:    A: Awake: RASS Goal/Actual Goal:    Actual:     B: Spontaneous Breathing Trial Performed?     C: SAT & SBT Coordinated?  n/a                      D: Delirium: CAM-ICU     E: Early Mobility Performed? Yes   F: Feeding Goal:    Status:     Current Diet Order   Procedures    Diet diabetic [facility]; 2000 Calorie; Cardiac (Low Na/Chol)     Order Specific Question:   Indicate patient location for additional diet options:     Answer:   [facility]     Order Specific Question:   Total calories:     Answer:   2000 Calorie     Order Specific Question:   Additional Diet Options:     Answer:   Cardiac (Low Na/Chol)      AS: Analgesia/Sedation Pt received oxycodone, should be discontinued   T: Thromboembolic Prophylaxis heparin   H: HOB > 300 Yes   U: Stress Ulcer Prophylaxis (if needed) pantoprazole   G: Glucose Control Y   B: Bowel Function     I: Indwelling Catheter (Lines & Sheth) Necessity PIV   D: De-escalation of Antimicrobials/Pharmacotherapies N    Plan for the day/ETD Watcher for ICU admission, BiPAP    Code Status:  Family/Goals of Care: Full Code  N        Critical secondary to Patient has a condition that poses threat to life and bodily function: Severe Respiratory Distress 2/2 narcotic administration.     Critical care was time spent personally by me on the following activities: development of treatment plan with patient or surrogate and bedside caregivers, discussions with consultants, evaluation of patient's response to treatment, examination of patient, ordering and performing treatments and interventions, ordering and review of laboratory studies, ordering and review of radiographic studies, pulse oximetry, re-evaluation of patient's condition. This critical care time did not overlap with that of any other provider or involve time for any procedures.    Thank you for your consult. I will sign off. Please contact us if you have any additional questions.     Discussed case with Dr. Clau Alvarez MD  Critical Care Medicine  [facility]

## 2019-05-26 NOTE — ASSESSMENT & PLAN NOTE
Pt with hx of brain bleed, ESRD, fevers. Pt received 30mg oxycodone in 9 hours. GCS initially 4, improved to 14 after narcan administration and jaw thrust. Pt requiring repeat PRN doses of narcan.  -primary team advised to contact MICU if not requiring more doses of narcan, will consider narcan drip if needed.   -low threshold to admit to MICU if somnolence and narcan necessity continues.  -d/c narcotics

## 2019-05-26 NOTE — ASSESSMENT & PLAN NOTE
RR called on Pt 5/26 in setting of AMS w/ increased lethargy and concern for airway protection.   Pt with hx of brain bleed, ESRD, fevers. Pt received 30mg oxycodone in 9 hours. GCS initially 4, improved to 14 after narcan administration and jaw thrust. Pt requiring repeat PRN doses of narcan.      -etiology likely combo of C02 retention in setting of brace and pain inhibiting full respirations and pain medication administration in setting renal injury    -contact MICU if not requiring more doses of narcan, will consider narcan drip if needed.   -low threshold to admit to MICU if somnolence and narcan necessity continues.  -d/c narcotics   AMS resolving s/p narcan and BiPAP, continue to monitor closely and continue BiPAP with backup rate to ensure pt does not become apneic. Goal sats 88-92%. ABGs improving; monitor q4hrs.

## 2019-05-26 NOTE — ASSESSMENT & PLAN NOTE
Pt with probable SERA, snoring respirations and hypercapneic blood gas in setting of narcotic administration in ESRD. Improved mentation, ventilation and PCO2 with jaw thrust and narcan administration.  -continue BiPAP with backup rate to ensure pt does not become apneic.  -recommend continuous capnography and pulse oximetry.   -ABG q4 for now  -Goal sats 88-92%

## 2019-05-26 NOTE — NURSING
md notified of bp and temp.  No new orders given.  Tylenol administered.  Passed onto oncoming nurse to monitor temp

## 2019-05-26 NOTE — PROGRESS NOTES
Pharmacokinetic Initial Assessment: IV Vancomycin    Assessment/Plan:    Initiate intravenous vancomycin with loading dose of 2250 mg once (20 mg/kg)  with subsequent doses when random concentrations are less than 25 mcg/ml. CKD III patient with uptrending Scr (1.7 -> 2.0 -> 2.3). Will draw vancomycin random level on 5/27 at 0430 with AM labs.  Pharmacy will continue to follow and monitor vancomycin.      Please contact pharmacy at extension [phone] with any questions regarding this assessment.     Thank you for the consult,   Brad Moulton     Patient brief summary:  Kathie Quezada is a 59 y.o. female initiated on antimicrobial therapy with IV Vancomycin for treatment of suspected Fever of Unknown Origin    Drug Allergies:   Review of patient's allergies indicates:   Allergen Reactions    Bactrim [sulfamethoxazole-trimethoprim] Hives    Codeine Hives    Penicillins Hives    Shellfish containing products Itching    Sulfa (sulfonamide antibiotics) Hives       Actual Body Weight:   113.4 kg    Renal Function:   Estimated Creatinine Clearance: 32.1 mL/min (A) (based on SCr of 2.3 mg/dL (H)).,     Dialysis Method (if applicable):  N/A    CBC (last 72 hours):  Recent Labs   Lab Result Units 05/24/19  0558 05/25/19  0348 05/26/19  0710   WBC K/uL 12.51 13.91* 15.71*   Hemoglobin g/dL 11.2* 10.6* 10.6*   Hemoglobin A1C %  --  4.8  --    Hematocrit % 34.4* 33.2* 32.1*   Platelets K/uL 174 153 122*   Gran% % 71.7 77.1* 80.6*   Lymph% % 17.2* 11.4* 8.9*   Mono% % 9.0 8.9 8.5   Eosinophil% % 1.1 1.9 1.2   Basophil% % 0.4 0.3 0.4   Differential Method  Automated Automated Automated       Metabolic Panel (last 72 hours):  Recent Labs   Lab Result Units 05/24/19  0558 05/24/19  0906 05/24/19  1103 05/25/19  0348 05/25/19  1239 05/25/19  1351 05/26/19  0350   Sodium mmol/L 141  --   --  138  --   --  135*   Potassium mmol/L 3.1*  --   --  3.7  --   --  4.4   Chloride mmol/L 109  --   --  107  --   --  105   CO2 mmol/L  21*  --   --  21*  --   --  19*   Glucose mg/dL 80  --   --  86  --   --  110   Glucose, UA   --  1+*  --   --  1+*  --   --    BUN, Bld mg/dL 22*  --   --  21*  --   --  25*   Creatinine mg/dL 1.7*  --   --  2.0*  --   --  2.3*   Creatinine, Random Ur mg/dL  --   --   --   --   --  78.0  --    Albumin g/dL 2.9*  --   --  2.7*  --   --  2.7*   Total Bilirubin mg/dL 0.5  --   --  0.5  --   --  0.4   Alkaline Phosphatase U/L 122  --   --  107  --   --  103   AST U/L 61*  --   --  22  --   --  20   ALT U/L 28  --   --  19  --   --  14   Magnesium mg/dL  --   --  1.4* 1.6  --   --  1.7   Phosphorus mg/dL  --   --  3.7  --   --   --   --        Drug levels (last 3 results):  No results for input(s): VANCOMYCINRA, VANCOMYCINPE, VANCOMYCINTR in the last 72 hours.    Microbiologic Results:  Microbiology Results (last 7 days)     Procedure Component Value Units Date/Time    Blood culture [[id number]] Collected:  05/25/19 0710    Order Status:  Completed Specimen:  Blood Updated:  05/26/19 0812     Blood Culture, Routine No Growth to date     Blood Culture, Routine No Growth to date    Blood culture [[id number]] Collected:  05/25/19 0710    Order Status:  Completed Specimen:  Blood Updated:  05/26/19 0812     Blood Culture, Routine No Growth to date     Blood Culture, Routine No Growth to date

## 2019-05-26 NOTE — PT/OT/SLP PROGRESS
Physical Therapy      Patient Name:  Kathie Quezada   MRN:  [id number]    Patient not seen today secondary to Other (Comment)(with [employer] ). Will follow-up as able.    Werner Del Valle, PT

## 2019-05-26 NOTE — SUBJECTIVE & OBJECTIVE
Interval History:   Febrile again overnight. Required mera replacement due to continued retention.   This AM appears drowsy and diaphoretic, however, awakens to voice and answers questions appropriately.     Review of Systems   Constitutional: Positive for diaphoresis and fever. Negative for appetite change and chills.   HENT: Negative for congestion, rhinorrhea, sneezing and sore throat.    Eyes: Negative for visual disturbance.   Respiratory: Negative for cough, shortness of breath and wheezing.    Cardiovascular: Negative for chest pain, palpitations and leg swelling.   Gastrointestinal: Negative for abdominal pain, blood in stool, constipation, diarrhea, nausea and vomiting.   Genitourinary: Positive for flank pain. Negative for dysuria.   Musculoskeletal: Positive for back pain.   Skin: Negative for rash and wound.   Neurological: Negative for dizziness and light-headedness.     Objective:     Vital Signs (Most Recent):  Temp: 98.1 °F (36.7 °C) (05/26/19 1459)  Pulse: 72 (05/26/19 1510)  Resp: 18 (05/26/19 1510)  BP: 129/61 (05/26/19 1459)  SpO2: 98 % (05/26/19 1510) Vital Signs (24h Range):  Temp:  [97.6 °F (36.4 °C)-103 °F (39.4 °C)] 98.1 °F (36.7 °C)  Pulse:  [] 72  Resp:  [16-20] 18  SpO2:  [91 %-98 %] 98 %  BP: ()/(53-85) 129/61     Weight: 113.4 kg (250 lb)  Body mass index is 45.73 kg/m².    Intake/Output Summary (Last 24 hours) at 5/26/2019 1540  Last data filed at 5/26/2019 0600  Gross per 24 hour   Intake 580 ml   Output 850 ml   Net -270 ml      Physical Exam   Constitutional: She is oriented to person, place, and time. She appears lethargic. No distress.   Pt in brace upon exam   HENT:   Head: Normocephalic and atraumatic.   Right Ear: External ear normal.   Left Ear: External ear normal.   Mouth/Throat: Oropharynx is clear and moist. No oropharyngeal exudate.   Eyes: Pupils are equal, round, and reactive to light. Conjunctivae and EOM are normal. No scleral icterus.   Neck: Normal  range of motion. Neck supple. No JVD present. No thyromegaly present.   Cardiovascular: Normal rate, regular rhythm, normal heart sounds and intact distal pulses. Exam reveals no gallop and no friction rub.   No murmur heard.  Pulmonary/Chest: Effort normal and breath sounds normal. No respiratory distress. She has no wheezes. She exhibits no tenderness.   Abdominal: Soft. Bowel sounds are normal. There is no tenderness. There is no guarding.   Genitourinary:   Genitourinary Comments: Sheth in place   Musculoskeletal: She exhibits tenderness (flank and back). She exhibits no edema.   Lymphadenopathy:     She has no cervical adenopathy.   Neurological: She is oriented to person, place, and time. She appears lethargic. No sensory deficit.   BL UE strength 5/5  BL LE strength 4/5   Skin: Skin is warm. No rash noted. She is diaphoretic.   Vitals reviewed.      Significant Labs:   Blood Culture:   Recent Labs   Lab 05/25/19  0710   LABBLOO No Growth to date  No Growth to date  No Growth to date  No Growth to date     CBC:   Recent Labs   Lab 05/25/19  0348 05/26/19  0710 05/26/19  1118   WBC 13.91* 15.71*  --    HGB 10.6* 10.6*  --    HCT 33.2* 32.1* 31*    122*  --      CMP:   Recent Labs   Lab 05/25/19  0348 05/26/19  0350 05/26/19  1347    135* 135*   K 3.7 4.4 4.2    105 103   CO2 21* 19* 24   GLU 86 110 142*   BUN 21* 25* 28*   CREATININE 2.0* 2.3* 3.1*   CALCIUM 8.4* 9.3 9.1   PROT 6.6 7.4 6.6   ALBUMIN 2.7* 2.7* 2.4*   BILITOT 0.5 0.4 0.4   ALKPHOS 107 103 85   AST 22 20 12   ALT 19 14 10   ANIONGAP 10 11 8   EGFRNONAA 27.1* 22.9* 16.0*     Urine Culture: No results for input(s): LABURIN in the last 48 hours.  Urine Studies:   Recent Labs   Lab 05/25/19  1239   COLORU Yellow   APPEARANCEUA Clear   PHUR 5.0   SPECGRAV 1.010   PROTEINUA 2+*   GLUCUA 1+*   KETONESU Negative   BILIRUBINUA Negative   OCCULTUA 2+*   NITRITE Negative   LEUKOCYTESUR Trace*   RBCUA 3   WBCUA 7*   BACTERIA Rare    SCOTTYAMEPITHEL 0   HYALINECASTS 4*       Significant Imaging: I have reviewed all pertinent imaging results/findings within the past 24 hours.

## 2019-05-27 PROBLEM — K59.01 SLOW TRANSIT CONSTIPATION: Status: ACTIVE | Noted: 2019-05-27

## 2019-05-27 LAB
ALBUMIN SERPL BCP-MCNC: 2.6 G/DL (ref 3.5–5.2)
ALP SERPL-CCNC: 89 U/L (ref 55–135)
ALT SERPL W/O P-5'-P-CCNC: 13 U/L (ref 10–44)
ANION GAP SERPL CALC-SCNC: 13 MMOL/L (ref 8–16)
ANISOCYTOSIS BLD QL SMEAR: SLIGHT
AST SERPL-CCNC: 12 U/L (ref 10–40)
BASOPHILS # BLD AUTO: 0.07 K/UL (ref 0–0.2)
BASOPHILS NFR BLD: 0.5 % (ref 0–1.9)
BILIRUB SERPL-MCNC: 0.3 MG/DL (ref 0.1–1)
BUN SERPL-MCNC: 36 MG/DL (ref 6–20)
CALCIUM SERPL-MCNC: 9.1 MG/DL (ref 8.7–10.5)
CHLORIDE SERPL-SCNC: 104 MMOL/L (ref 95–110)
CO2 SERPL-SCNC: 16 MMOL/L (ref 23–29)
CREAT SERPL-MCNC: 3.4 MG/DL (ref 0.5–1.4)
DIFFERENTIAL METHOD: ABNORMAL
EOSINOPHIL # BLD AUTO: 0.3 K/UL (ref 0–0.5)
EOSINOPHIL NFR BLD: 1.8 % (ref 0–8)
ERYTHROCYTE [DISTWIDTH] IN BLOOD BY AUTOMATED COUNT: 14 % (ref 11.5–14.5)
EST. GFR  (AFRICAN AMERICAN): 16.5 ML/MIN/1.73 M^2
EST. GFR  (NON AFRICAN AMERICAN): 14.3 ML/MIN/1.73 M^2
GLUCOSE SERPL-MCNC: 112 MG/DL (ref 70–110)
HCT VFR BLD AUTO: 32.4 % (ref 37–48.5)
HGB BLD-MCNC: 10.4 G/DL (ref 12–16)
IMM GRANULOCYTES # BLD AUTO: 0.06 K/UL (ref 0–0.04)
IMM GRANULOCYTES NFR BLD AUTO: 0.4 % (ref 0–0.5)
LYMPHOCYTES # BLD AUTO: 2.3 K/UL (ref 1–4.8)
LYMPHOCYTES NFR BLD: 15.9 % (ref 18–48)
MAGNESIUM SERPL-MCNC: 1.5 MG/DL (ref 1.6–2.6)
MCH RBC QN AUTO: 28 PG (ref 27–31)
MCHC RBC AUTO-ENTMCNC: 32.1 G/DL (ref 32–36)
MCV RBC AUTO: 87 FL (ref 82–98)
MONOCYTES # BLD AUTO: 1.6 K/UL (ref 0.3–1)
MONOCYTES NFR BLD: 11.1 % (ref 4–15)
NEUTROPHILS # BLD AUTO: 10.3 K/UL (ref 1.8–7.7)
NEUTROPHILS NFR BLD: 70.3 % (ref 38–73)
NRBC BLD-RTO: 0 /100 WBC
PLATELET # BLD AUTO: 113 K/UL (ref 150–350)
PLATELET BLD QL SMEAR: ABNORMAL
PMV BLD AUTO: 11 FL (ref 9.2–12.9)
POCT GLUCOSE: 151 MG/DL (ref 70–110)
POCT GLUCOSE: 209 MG/DL (ref 70–110)
POLYCHROMASIA BLD QL SMEAR: ABNORMAL
POTASSIUM SERPL-SCNC: 4.2 MMOL/L (ref 3.5–5.1)
PROT SERPL-MCNC: 7.1 G/DL (ref 6–8.4)
RBC # BLD AUTO: 3.71 M/UL (ref 4–5.4)
SODIUM SERPL-SCNC: 133 MMOL/L (ref 136–145)
VANCOMYCIN SERPL-MCNC: 32.4 UG/ML
WBC # BLD AUTO: 14.71 K/UL (ref 3.9–12.7)

## 2019-05-27 PROCEDURE — 83735 ASSAY OF MAGNESIUM: CPT

## 2019-05-27 PROCEDURE — 94660 CPAP INITIATION&MGMT: CPT

## 2019-05-27 PROCEDURE — 94761 N-INVAS EAR/PLS OXIMETRY MLT: CPT

## 2019-05-27 PROCEDURE — 82570 ASSAY OF URINE CREATININE: CPT

## 2019-05-27 PROCEDURE — 25000003 PHARM REV CODE 250: Performed by: STUDENT IN AN ORGANIZED HEALTH CARE EDUCATION/TRAINING PROGRAM

## 2019-05-27 PROCEDURE — 25000003 PHARM REV CODE 250: Performed by: INTERNAL MEDICINE

## 2019-05-27 PROCEDURE — 81001 URINALYSIS AUTO W/SCOPE: CPT

## 2019-05-27 PROCEDURE — 63600175 PHARM REV CODE 636 W HCPCS: Performed by: INTERNAL MEDICINE

## 2019-05-27 PROCEDURE — 36415 COLL VENOUS BLD VENIPUNCTURE: CPT

## 2019-05-27 PROCEDURE — 87086 URINE CULTURE/COLONY COUNT: CPT

## 2019-05-27 PROCEDURE — 97161 PT EVAL LOW COMPLEX 20 MIN: CPT

## 2019-05-27 PROCEDURE — 11000001 HC ACUTE MED/SURG PRIVATE ROOM

## 2019-05-27 PROCEDURE — 99233 SBSQ HOSP IP/OBS HIGH 50: CPT | Mod: GC,,, | Performed by: HOSPITALIST

## 2019-05-27 PROCEDURE — 85025 COMPLETE CBC W/AUTO DIFF WBC: CPT

## 2019-05-27 PROCEDURE — 80053 COMPREHEN METABOLIC PANEL: CPT

## 2019-05-27 PROCEDURE — 80202 ASSAY OF VANCOMYCIN: CPT

## 2019-05-27 PROCEDURE — 99233 PR SUBSEQUENT HOSPITAL CARE,LEVL III: ICD-10-PCS | Mod: GC,,, | Performed by: HOSPITALIST

## 2019-05-27 RX ORDER — MAGNESIUM SULFATE HEPTAHYDRATE 40 MG/ML
2 INJECTION, SOLUTION INTRAVENOUS ONCE
Status: COMPLETED | OUTPATIENT
Start: 2019-05-27 | End: 2019-05-27

## 2019-05-27 RX ORDER — OXYCODONE HYDROCHLORIDE 5 MG/1
5 TABLET ORAL EVERY 6 HOURS PRN
Status: DISCONTINUED | OUTPATIENT
Start: 2019-05-27 | End: 2019-05-30 | Stop reason: HOSPADM

## 2019-05-27 RX ORDER — POLYETHYLENE GLYCOL 3350 17 G/17G
17 POWDER, FOR SOLUTION ORAL 2 TIMES DAILY
Status: DISCONTINUED | OUTPATIENT
Start: 2019-05-27 | End: 2019-05-30 | Stop reason: HOSPADM

## 2019-05-27 RX ORDER — AMOXICILLIN 250 MG
1 CAPSULE ORAL 2 TIMES DAILY
Status: DISCONTINUED | OUTPATIENT
Start: 2019-05-27 | End: 2019-05-30 | Stop reason: HOSPADM

## 2019-05-27 RX ORDER — TRAMADOL HYDROCHLORIDE 50 MG/1
50 TABLET ORAL 2 TIMES DAILY PRN
Status: DISCONTINUED | OUTPATIENT
Start: 2019-05-27 | End: 2019-05-27

## 2019-05-27 RX ORDER — TRAMADOL HYDROCHLORIDE 50 MG/1
50 TABLET ORAL 2 TIMES DAILY PRN
Status: DISCONTINUED | OUTPATIENT
Start: 2019-05-27 | End: 2019-05-30 | Stop reason: HOSPADM

## 2019-05-27 RX ADMIN — AMLODIPINE BESYLATE 10 MG: 10 TABLET ORAL at 08:05

## 2019-05-27 RX ADMIN — POTASSIUM CHLORIDE 20 MEQ: 1500 TABLET, EXTENDED RELEASE ORAL at 08:05

## 2019-05-27 RX ADMIN — ACETAMINOPHEN 650 MG: 325 TABLET ORAL at 01:05

## 2019-05-27 RX ADMIN — CARVEDILOL 25 MG: 25 TABLET, FILM COATED ORAL at 08:05

## 2019-05-27 RX ADMIN — HEPARIN SODIUM 7500 UNITS: 5000 INJECTION, SOLUTION INTRAVENOUS; SUBCUTANEOUS at 10:05

## 2019-05-27 RX ADMIN — POLYETHYLENE GLYCOL 3350 17 G: 17 POWDER, FOR SOLUTION ORAL at 10:05

## 2019-05-27 RX ADMIN — DIPHENHYDRAMINE HYDROCHLORIDE 50 MG: 50 CAPSULE ORAL at 05:05

## 2019-05-27 RX ADMIN — DIPHENHYDRAMINE HYDROCHLORIDE 50 MG: 50 CAPSULE ORAL at 10:05

## 2019-05-27 RX ADMIN — HEPARIN SODIUM 7500 UNITS: 5000 INJECTION, SOLUTION INTRAVENOUS; SUBCUTANEOUS at 05:05

## 2019-05-27 RX ADMIN — ACETAMINOPHEN 650 MG: 325 TABLET ORAL at 09:05

## 2019-05-27 RX ADMIN — PANTOPRAZOLE SODIUM 40 MG: 40 TABLET, DELAYED RELEASE ORAL at 08:05

## 2019-05-27 RX ADMIN — HEPARIN SODIUM 7500 UNITS: 5000 INJECTION, SOLUTION INTRAVENOUS; SUBCUTANEOUS at 02:05

## 2019-05-27 RX ADMIN — MAGNESIUM SULFATE IN WATER 2 G: 40 INJECTION, SOLUTION INTRAVENOUS at 08:05

## 2019-05-27 RX ADMIN — TRAMADOL HYDROCHLORIDE 50 MG: 50 TABLET, FILM COATED ORAL at 08:05

## 2019-05-27 RX ADMIN — LEVOTHYROXINE SODIUM 200 MCG: 100 TABLET ORAL at 08:05

## 2019-05-27 RX ADMIN — SENNOSIDES AND DOCUSATE SODIUM 1 TABLET: 8.6; 5 TABLET ORAL at 08:05

## 2019-05-27 RX ADMIN — CARVEDILOL 25 MG: 25 TABLET, FILM COATED ORAL at 04:05

## 2019-05-27 RX ADMIN — ATORVASTATIN CALCIUM 10 MG: 10 TABLET, FILM COATED ORAL at 08:05

## 2019-05-27 RX ADMIN — CEFEPIME 2 G: 2 INJECTION, POWDER, FOR SOLUTION INTRAVENOUS at 09:05

## 2019-05-27 RX ADMIN — ESCITALOPRAM OXALATE 10 MG: 5 TABLET, FILM COATED ORAL at 08:05

## 2019-05-27 RX ADMIN — ONDANSETRON 8 MG: 8 TABLET, ORALLY DISINTEGRATING ORAL at 10:05

## 2019-05-27 RX ADMIN — TRAMADOL HYDROCHLORIDE 50 MG: 50 TABLET, FILM COATED ORAL at 10:05

## 2019-05-27 RX ADMIN — POLYETHYLENE GLYCOL 3350 17 G: 17 POWDER, FOR SOLUTION ORAL at 08:05

## 2019-05-27 RX ADMIN — OXYCODONE HYDROCHLORIDE 5 MG: 5 TABLET ORAL at 02:05

## 2019-05-27 RX ADMIN — VITAMIN D, TAB 1000IU (100/BT) 1000 UNITS: 25 TAB at 08:05

## 2019-05-27 RX ADMIN — SENNOSIDES AND DOCUSATE SODIUM 1 TABLET: 8.6; 5 TABLET ORAL at 10:05

## 2019-05-27 NOTE — SUBJECTIVE & OBJECTIVE
Interval History: Did not use BIPAP overnight. This morning complaining of back pain. Cr increased to 3.4. Still with mera, making good urine. Afebrile since yesterday after starting abx. Culture NGTD. Complained of abd distention and pain, no BM in 3 days    Review of Systems   Constitutional: Positive for diaphoresis and fever. Negative for appetite change and chills.   HENT: Negative for congestion, rhinorrhea, sneezing and sore throat.    Eyes: Negative for visual disturbance.   Respiratory: Negative for cough, shortness of breath and wheezing.    Cardiovascular: Negative for chest pain, palpitations and leg swelling.   Gastrointestinal: Positive for abdominal distention, abdominal pain and constipation. Negative for blood in stool, diarrhea, nausea and vomiting.   Genitourinary: Positive for flank pain. Negative for dysuria.   Musculoskeletal: Positive for back pain.   Skin: Negative for rash and wound.   Neurological: Negative for dizziness and light-headedness.     Objective:     Vital Signs (Most Recent):  Temp: 98.6 °F (37 °C) (05/27/19 1201)  Pulse: 78 (05/27/19 1201)  Resp: 17 (05/27/19 1201)  BP: (!) 153/70 (05/27/19 1201)  SpO2: 96 % (05/27/19 1201) Vital Signs (24h Range):  Temp:  [97.8 °F (36.6 °C)-98.6 °F (37 °C)] 98.6 °F (37 °C)  Pulse:  [64-78] 78  Resp:  [15-19] 17  SpO2:  [89 %-99 %] 96 %  BP: (113-153)/(55-70) 153/70     Weight: 113.4 kg (250 lb)  Body mass index is 45.73 kg/m².    Intake/Output Summary (Last 24 hours) at 5/27/2019 1452  Last data filed at 5/27/2019 1400  Gross per 24 hour   Intake 1850 ml   Output 1725 ml   Net 125 ml      Physical Exam   Constitutional: She is oriented to person, place, and time. No distress.   HENT:   Head: Normocephalic and atraumatic.   Right Ear: External ear normal.   Left Ear: External ear normal.   Mouth/Throat: Oropharynx is clear and moist. No oropharyngeal exudate.   Eyes: Pupils are equal, round, and reactive to light. Conjunctivae and EOM are  normal. No scleral icterus.   Neck: Normal range of motion. Neck supple. No JVD present. No thyromegaly present.   Cardiovascular: Normal rate, regular rhythm, normal heart sounds and intact distal pulses. Exam reveals no gallop and no friction rub.   No murmur heard.  Pulmonary/Chest: Effort normal and breath sounds normal. No respiratory distress. She has no wheezes. She exhibits no tenderness.   Abdominal: Soft. Bowel sounds are normal. There is tenderness. There is no guarding.   Genitourinary:   Genitourinary Comments: Sheth in place   Musculoskeletal: She exhibits tenderness (flank and back). She exhibits no edema.   Lymphadenopathy:     She has no cervical adenopathy.   Neurological: She is oriented to person, place, and time. No sensory deficit.   BL UE strength 5/5  BL LE strength 4/5   Skin: Skin is warm. No rash noted.   Vitals reviewed.      Significant Labs: All pertinent labs within the past 24 hours have been reviewed.    Significant Imaging: I have reviewed all pertinent imaging results/findings within the past 24 hours.

## 2019-05-27 NOTE — PROGRESS NOTES
Pharmacokinetic Initial Assessment: IV Vancomycin    Therapy with vancomycin and consult has been discontinued by provider.  Pharmacy will sign off, please re-consult as needed.    Tomasa Neal, PharmD, BCPS, Internal Medicine Clinical Pharmacy Specialist  EXT [phone]

## 2019-05-27 NOTE — ASSESSMENT & PLAN NOTE
"60 yo female s/p mechanical fall with superior L2 compression fracture.  After presentation to ED, c/o of urinary retnetion w/ bladder scan significant for >1L retained urine and UOP w/ mera approx 1200cc.  Pt denies previous issue with urinary of bowel incontinence or retention.   Potential etiologies of acute retention include spinal compression, pain, pain medication received upon admission, UTI.   Per NSGY, "degree of canal compression not typical for development of acute severe polyradiculopathy."    UTI negative for infection.   Continue mera for now but plan for voiding trial tomorrow   Renal U/S w/o acute process  Oxy is available for pain control but try to use non-narcotics  Neuro-checks q4 hours.  "

## 2019-05-27 NOTE — PROGRESS NOTES
"[facility] Hospital Medicine  Progress Note    Patient Name: Kathie Quezada  MRN: [id number]  Patient Class: IP- Inpatient   Admission Date: 5/23/2019  Length of Stay: 3 days  Attending Physician: Frank Brand, *  Primary Care Provider: Elan Price MD    Hospital Medicine Team: [facility] HOSP MED 4 Ramone Blanco MD    Subjective:     Principal Problem:Closed compression fracture of L2 lumbar vertebra    HPI:  Kathie Quezada is a 58 y.o. woman with a history of HFpEF (EF: 60% 1/19), CKD Stage G3a/A3 c/b Nephrotic Range Proteinuria, DM2 (A1c: 5.2%) not taking Insulin at home, HTN, HLD, Hypothyroidism (s/p Thyroidectomy), GERD, Hx of CVA (no residual deficits) and Hx HCV s/p Jovannioni (2015) with reported undetectable VL, and chronic back pain 2/2 T12 fracture who presents to [facility] ED via EMS after a fall at home. Pt reports picking up her grand-daughter, becoming unbalanced, and falling on her back. She denies any dizziness, lightheadedness, or other pre-syncopal symptoms prior to the fall. She also denies any head trauma. However, she notes a "crack" upon her impact with the ground in addition to the onset of pain. She also had "no feeling in my legs" and experienced a loss of sensation until EMS arrived at the scene. Pt denies any recent exacerbations related to her chronic co-morbidities. Her baseline back pain is about a 5/10 and she takes no medication for it; her T12 fracture occurred approx 3 years ago. She denies any recent exacerbations in terms of SOB, leg swelling, and BP control. She further denies any F/C, N/V, diarrhea, constipation, dysuria, CP, palpitations.     While in the ED, Pt was reported to complain of urinary retention. A bladder scan revealed >1L and Pt had UOP of 1200cc w/ insertion of a urinary catheter. Pt is uncertain if it was her pain that limited her ability to urinate. She has not had issues with urinary retention, incontinence, decreased " stream, straining to urinate previously.     Hospital Course:  Admitted from the ED to  on 5/24 in setting of urinary retention s/p mechanical fall resulting in L2 compression fracture with associated hematoma. Per NSGY, no acute intervention needed and Pt to f/u out-pt. Pt admitted with neurochecks and to assess resolution of urinary retention following pain control. Patient became febrile without clear source of infection.     Interval History: Did not use BIPAP overnight. This morning complaining of back pain. Cr increased to 3.4. Still with mera, making good urine. Afebrile since yesterday after starting abx. Culture NGTD. Complained of abd distention and pain, no BM in 3 days    Review of Systems   Constitutional: Positive for diaphoresis and fever. Negative for appetite change and chills.   HENT: Negative for congestion, rhinorrhea, sneezing and sore throat.    Eyes: Negative for visual disturbance.   Respiratory: Negative for cough, shortness of breath and wheezing.    Cardiovascular: Negative for chest pain, palpitations and leg swelling.   Gastrointestinal: Positive for abdominal distention, abdominal pain and constipation. Negative for blood in stool, diarrhea, nausea and vomiting.   Genitourinary: Positive for flank pain. Negative for dysuria.   Musculoskeletal: Positive for back pain.   Skin: Negative for rash and wound.   Neurological: Negative for dizziness and light-headedness.     Objective:     Vital Signs (Most Recent):  Temp: 98.6 °F (37 °C) (05/27/19 1201)  Pulse: 78 (05/27/19 1201)  Resp: 17 (05/27/19 1201)  BP: (!) 153/70 (05/27/19 1201)  SpO2: 96 % (05/27/19 1201) Vital Signs (24h Range):  Temp:  [97.8 °F (36.6 °C)-98.6 °F (37 °C)] 98.6 °F (37 °C)  Pulse:  [64-78] 78  Resp:  [15-19] 17  SpO2:  [89 %-99 %] 96 %  BP: (113-153)/(55-70) 153/70     Weight: 113.4 kg (250 lb)  Body mass index is 45.73 kg/m².    Intake/Output Summary (Last 24 hours) at 5/27/2019 3281  Last data filed at 5/27/2019  1400  Gross per 24 hour   Intake 1850 ml   Output 1725 ml   Net 125 ml      Physical Exam   Constitutional: She is oriented to person, place, and time. No distress.   HENT:   Head: Normocephalic and atraumatic.   Right Ear: External ear normal.   Left Ear: External ear normal.   Mouth/Throat: Oropharynx is clear and moist. No oropharyngeal exudate.   Eyes: Pupils are equal, round, and reactive to light. Conjunctivae and EOM are normal. No scleral icterus.   Neck: Normal range of motion. Neck supple. No JVD present. No thyromegaly present.   Cardiovascular: Normal rate, regular rhythm, normal heart sounds and intact distal pulses. Exam reveals no gallop and no friction rub.   No murmur heard.  Pulmonary/Chest: Effort normal and breath sounds normal. No respiratory distress. She has no wheezes. She exhibits no tenderness.   Abdominal: Soft. Bowel sounds are normal. There is tenderness. There is no guarding.   Genitourinary:   Genitourinary Comments: Sheth in place   Musculoskeletal: She exhibits tenderness (flank and back). She exhibits no edema.   Lymphadenopathy:     She has no cervical adenopathy.   Neurological: She is oriented to person, place, and time. No sensory deficit.   BL UE strength 5/5  BL LE strength 4/5   Skin: Skin is warm. No rash noted.   Vitals reviewed.      Significant Labs: All pertinent labs within the past 24 hours have been reviewed.    Significant Imaging: I have reviewed all pertinent imaging results/findings within the past 24 hours.    Assessment/Plan:      * Closed compression fracture of L2 lumbar vertebra  58 yo female s/p mechanical fall with superior L2 compression fracture.   Small bilateral EDH on MRI adjacent to pedicles at L2 with mild thecal sac compression.  L-spine Ct w/ stable superior endplate compression fracture  Per NSGY,    No acute neurosurgical intervention   TLSO brace when out of bed more than 5 minutes or prn for comfort while in bed   Will arrange for pt to follow  up in six weeks in outpatient clinic with interval plain films to confirm healing fracture   Cleared for discharge per neurosurgical perspective upon completion of baseline plain films  Previous DEXA w/ osteopenia; initiating Ca and Vit supplements    Due to persistent pain, will start Oxy-IR 5 mg Q6 hrs for severe pain.   PT/OT recommending SNF       Slow transit constipation  Scheduled miralax and senna-docusate       Acute hypercapnic respiratory failure  RR called on Pt 5/26 in setting of AMS w/ increased lethargy and concern for airway protection.   Pt with hx of brain bleed, ESRD, fevers. Pt received 30mg oxycodone in 9 hours. GCS initially 4, improved to 14 after narcan administration and jaw thrust. Pt requiring repeat PRN doses of narcan.      -etiology likely combo of C02 retention in setting of brace and pain inhibiting full respirations and pain medication administration in setting renal injury    -contact MICU if not requiring more doses of narcan, will consider narcan drip if needed.   -low threshold to admit to MICU if somnolence and narcan necessity continues.  -d/c narcotics   AMS resolving s/p narcan and BiPAP, continue to monitor closely and continue BiPAP with backup rate to ensure pt does not become apneic. Goal sats 88-92%. ABGs improving; monitor q4hrs.     Altered mental status  RR called on Pt 5/26 in setting of AMS w/ increased lethargy and concern for airway protection.   Pt with hx of brain bleed, ESRD, fevers. Pt received 30mg oxycodone in 9 hours. GCS initially 4, improved to 14 after narcan administration and jaw thrust. Pt requiring repeat PRN doses of narcan.      -etiology likely combo of C02 retention in setting of brace and pain inhibiting full respirations and pain medication administration in setting renal injury    -contact MICU if not requiring more doses of narcan, will consider narcan drip if needed.   -low threshold to admit to MICU if somnolence and narcan necessity  "continues.  -d/c narcotics   AMS resolving s/p narcan and BiPAP, continue to monitor closely      Fever  Remains febrile intermittently   WCC increased to 15.71  Suspect more inflammatory reaction in setting of fracture vs infectious etiology  CXR negative for acute process, UA w/o infection, blood cx pending but low suspicion for +  Abx started today following rapid response during which there was concern for hypotension, which was likely due to cuff misplacement  However, due to ongoing fevers w/o source, will cover broadly with renally dosed vanc and cefepime    Hypokalemia  Admit K 3.1  Likely due to diuretic use  Replete and monitor      GERD (gastroesophageal reflux disease)  - Protonix 40 mg daily      Hypothyroidism  - Synthroid 200 mcg daily  - Recent TSH/FT4 wnl's    Factor XI deficiency  Initially holding lovenox and SCDs for DVT ppx  However, in setting of ongoing fevers concern for embolus  BL LE U/S negative  D-dimer 1.24, unable to pursue CTA and V/Q scan at this time due to ARUNA and Pt status respectively   Will start heparin 7500U q8 hours      Acute urinary retention  60 yo female s/p mechanical fall with superior L2 compression fracture.  After presentation to ED, c/o of urinary retnetion w/ bladder scan significant for >1L retained urine and UOP w/ mera approx 1200cc.  Pt denies previous issue with urinary of bowel incontinence or retention.   Potential etiologies of acute retention include spinal compression, pain, pain medication received upon admission, UTI.   Per NSGY, "degree of canal compression not typical for development of acute severe polyradiculopathy."    UTI negative for infection.   Continue mera for now but plan for voiding trial tomorrow   Renal U/S w/o acute process  Oxy is available for pain control but try to use non-narcotics  Neuro-checks q4 hours.    (HFpEF) heart failure with preserved ejection fraction  Pt euvolemic on exam.   No c/o of SOB, CP, palpitations.   Holding " home lasix 40mg BID in setting of potential ARUNA.       CKD stage G3b/A3, GFR 30-44 and albumin creatinine ratio >300 mg/g  Previous extensive CKD workup indicates that HTN and DM2 etiologies of CKD (JB, cryoglobulins, SPEP/UPEP, ANCA's, and complement all wnl's)  Holding lasix, urine lytes pending  Cr worsening every day (now 3.4). Nephrology consulted. Repeat UA and P/C ratio ordered  Avoid nephrotoxic agents and renally dose medications    Hyperlipidemia  Continue home statin  Previous CVA hx, no residual side effects      HTN (hypertension)  On arrival Pt w/ BP 170s/80s. Per Pt, baseline on home medication closer to SBPs 130s-140s.  Home medications include: amlodipine 10mg qd, coreg 25mg bid, lisinopril 20mg qd. Also hydralazine 25mg for SBP>160.  Present values likely due to acute fracture pain. Will optimize pain regimen.   Continue home medications; titrate as necessary.        DM2 (diabetes mellitus, type 2)  -low dose SSI, not taking Insulin at home and most recent A1c at goal  -diabetic diet        VTE Risk Mitigation (From admission, onward)        Ordered     heparin (porcine) injection 7,500 Units  Every 8 hours      05/26/19 1338     IP VTE HIGH RISK PATIENT  Once      05/25/19 0553     Place EMILIA hose  Until discontinued      05/25/19 0553     Place sequential compression device  Until discontinued      05/24/19 1644              Ramone Blanco MD  Department of Hospital Medicine   [facility]

## 2019-05-27 NOTE — ASSESSMENT & PLAN NOTE
Previous extensive CKD workup indicates that HTN and DM2 etiologies of CKD (JB, cryoglobulins, SPEP/UPEP, ANCA's, and complement all wnl's)  Holding lasix, urine lytes pending  Cr worsening every day (now 3.4). Nephrology consulted. Repeat UA and P/C ratio ordered  Avoid nephrotoxic agents and renally dose medications

## 2019-05-27 NOTE — HPI
60 y/o Black or -American woman with CKD3b (baseline sCr 1.6-1.7), HFpEF, DM2, HTN, dyslipidemia, hypothyroidism, GERD, hx of CVA no residual deficits, hx HCV s/p Porsha (2015), admitted to [facility] after a mechanical fall and found to have an L2 compression fracture with bilateral epidural hematomas.  She arrived with sCr at baseline 1.7 on 5/24, but subsequently increased to 2 on 5/25, 2.3 on 5/26, and 3.4 on 5/27.  At the ER on 5/24, patient presented with urinary retention > 999 mL in bladder scan.  I/O cath.  Again developed urinary retention next day, and placed on mera catheter since 5/25.    Nephrology consulted for evaluation of Eliezer on CKD.

## 2019-05-27 NOTE — PT/OT/SLP EVAL
Physical Therapy Evaluation    Patient Name:  Kathie Quezada   MRN:  [id number]    Recommendations:     Discharge Recommendations:  nursing facility, skilled   Discharge Equipment Recommendations: commode   Barriers to discharge: requires increased assistance for functional mobility due to back pain    Assessment:     Kathie Quezada is a 59 y.o. female admitted with a medical diagnosis of Closed compression fracture of L2 lumbar vertebra.  She presents with the following impairments/functional limitations:  weakness, impaired endurance, impaired self care skills, impaired functional mobilty, gait instability, impaired balance, decreased lower extremity function, pain, orthopedic precautions. Patient limited in PT evaluation today due to 0/10 back pain despite being pre-medicated. Once seated on edge of bed, patient frequently asking to lay back in bed due to pain. PT educated patient on purpose of PT to regain mobility but declined and stated she needed to lay back in bed because pain was too bad. Patient would continue to benefit from PT in order to get back to PLOF.     Rehab Prognosis: Fair; patient would benefit from acute skilled PT services to address these deficits and reach maximum level of function.    Recent Surgery: * No surgery found *      Plan:     During this hospitalization, patient to be seen 5 x/week to address the identified rehab impairments via gait training, therapeutic activities, therapeutic exercises and progress toward the following goals:    · Plan of Care Expires:  06/27/19    Subjective     Chief Complaint: Back pain.   Patient/Family Comments/goals: To get back to PLOF.   Pain/Comfort:  · Pain Rating 1: 10/10  · Location 1: back  · Pain Addressed 1: Pre-medicate for activity, Reposition, Cessation of Activity, Nurse notified  · Pain Rating Post-Intervention 1: 10/10    Living Environment:  Patient lives alone in a [facility] with 1 step to enter. Prior to admission, patients  level of function was indepedent. Equipment used at home: walker, standard, shower chair.   Upon discharge, patient will have assistance from children and grandchildren who live close by.    Objective:     Communicated with RN prior to session. Patient found supine with telemetry, peripheral IV, mera catheter, pulse ox (continuous), TLSO  upon PT entry to room. Tech present to assist during evaluation.     General Precautions: Standard, fall   Orthopedic Precautions:spinal precautions   Braces: TLSO     Exams:  · Cognitive Exam:  Patient is oriented to Person and Place   · Sensation:  Impaired to B feet per patient report due to neuropathy   · RLE ROM: WFL AAROM   · RLE Strength: limited due to pain  · LLE ROM: WFL AAROM   · LLE Strength: limited due to pain    Functional Mobility:  · Bed Mobility:     · Rolling Right: maximal assistance  · Scooting: total assistance  · Supine to Sit: maximal assistance  · Sit to Supine: total assistance    Therapeutic Activities and Exercises:  Patient only able to tolerate sitting on edge of bed ~8 minutes with max/total A. She frequently requested to lay back down due to pain.     Patient educated in:  -PT role and POC  -OOB activity to maximize recovery  -spinal precautions  -TLSO when out of bed  -log rolling technique during bed mobility     AM-PAC 6 CLICK MOBILITY  Total Score:8     Patient left supine with all lines intact, call button in reach, RN notified and bilateral green heel protectors applied .    GOALS:   Multidisciplinary Problems     Physical Therapy Goals        Problem: Physical Therapy Goal    Goal Priority Disciplines Outcome Goal Variances Interventions   Physical Therapy Goal     PT, PT/OT      Description:  Goals to be met by: 19    Patient will increase functional independence with mobility by performin. Supine to sit with Stand-by Assistance  2. Rolling to Left and Right with Stand-by Assistance using log rolling technique   3. Sit to stand  transfer with Stand-by Assistance  4. Bed to chair transfer with Stand-by Assistance using Rolling Walker  5. Gait x25 feet with Stand-by Assistance using Rolling Walker  6. Sitting at edge of bed x20 minutes with Stand-by Assistance                      History:     Past Medical History:   Diagnosis Date    CHF (congestive heart failure)     CKD stage G3a/A3, GFR 45-59 and albumin creatinine ratio >300 mg/g 5/16/2018    CVA (cerebral vascular accident) 2017    Essential hypertension 4/9/2018    Factor XI deficiency 1978    GIB (gastrointestinal bleeding) 10/18/2018    History of hepatitis C     s/p succesful Rx w/ Harv[facility] by Dr Herbert  - SVR12 (cure) 2016    Hyperlipidemia     Mixed hyperlipidemia 4/9/2018    Postoperative hypothyroidism     Proteinuria 4/9/2018    Type 2 diabetes mellitus with circulatory disorder, with long-term current use of insulin 4/9/2018       Past Surgical History:   Procedure Laterality Date    BACK SURGERY      COLONOSCOPY  11/13/2015    Dr Chino torres prep. internal hemorrhoids. diverticulosis of sigmois. NO polyps. repeat due 2025    galbladder      HYSTERECTOMY      OOPHORECTOMY      one    TONSILLECTOMY         Time Tracking:     PT Received On: 05/27/19  PT Start Time: 0945     PT Stop Time: 1003  PT Total Time (min): 18 min     Billable Minutes: Evaluation 18    Chetna Nance, PT  05/27/2019

## 2019-05-27 NOTE — PLAN OF CARE
This  (JERMAINE) assigned to case today 5/27. SW met with Patient at Bedside. Patient was alert and oriented. SW introduced herself and explained the Discharge Assessment process. Patient expressed understanding and agreed to the assessment. Patient confirmed the following: current address, Social Security number, Emergency Contacts, Date of Birth, [facility] [place] [address]), personal contact numbers, and Primary Care Physician. Patient reported that she was admitted from the Emergency Department. Patient reported that she lives in a 1 story house with 2 steps to entry. Patient lives alone with no pets. Patient reported that she received Home Health services with [facility] in the past, but is not current. Patient reported that she has a walker, shower chair, and a raised toilet seat. Patient was able to drive before admit. Patient does not forsee any additional needs upon discharge.        05/27/19 1333   Discharge Assessment   Assessment Type Discharge Planning Assessment   Confirmed/corrected address and phone number on facesheet? Yes   Assessment information obtained from? Patient   Communicated expected length of stay with patient/caregiver yes   Prior to hospitilization cognitive status: Alert/Oriented   Prior to hospitalization functional status: Assistive Equipment;Independent   Current cognitive status: Alert/Oriented   Current Functional Status: Assistive Equipment;Independent   Facility Arrived From: [facility]   Lives With alone   Able to Return to Prior Arrangements yes   Is patient able to care for self after discharge? Yes   Patient's perception of discharge disposition admitted as an inpatient   Readmission Within the Last 30 Days no previous admission in last 30 days   Patient currently being followed by outpatient case management? No   Patient currently receives any other outside agency services? No   Equipment Currently Used at Home walker,  standard;shower chair   Do you have any problems affording any of your prescribed medications? No   Is the patient taking medications as prescribed? yes   Does the patient have transportation home? Yes  (Children will provide transport)   Transportation Anticipated family or friend will provide   Does the patient receive services at the Coumadin Clinic? No   Discharge Plan A Home Health;Home   Discharge Plan B Home   DME Needed Upon Discharge  walker, standard;shower chair   Patient/Family in Agreement with Plan yes       Brandie Valencia LMSW  [facility]  [phone]

## 2019-05-27 NOTE — PT/OT/SLP PROGRESS
Occupational Therapy      Patient Name:  Kathie Quezada   MRN:  [id number]    Patient not seen today secondary to Other (Comment)(Pt refused 2* just finished working c PT and also d/t pain.). Will follow-up tomorrow.    LEIGHANN Oconnell  5/27/2019

## 2019-05-27 NOTE — NURSING
Pt requested BiPap off and SCD's , pt reports that  She has been suffering from generalized itching that is only getting worse, believes she may be allergic to antibiotic or heparin.

## 2019-05-27 NOTE — PHARMACY MED REC
"Admission Medication Reconciliation - Pharmacy Consult Note    The home medication history was taken by Deborah Wilson, Pharmacy Technician.  Based on information gathered and subsequent review by the clinical pharmacist, the items below may need attention.     You may go to "Admission" then "Reconcile Home Medications" tabs to review and/or act upon these items.     Potentially problematic discrepancies with current MAR  o Patient IS taking the following which was not ordered upon admit  o Aspirin 81 mg oral daily  o Furosemide 40 mg oral twice daily  o Gabapentin 100 mg oral daily  o Lisinopril 20 mg oral daily  o Patient IS NOT taking the following which was ordered upon admit  o Vitamin D 1000 units oral daily  o Potassium chloride 20 mEq oral daily    Please address this information as you see fit.  Feel free to contact us if you have any questions or require assistance.    Thank you,   Breana Patino, PharmD  Emergency Medicine Clinical Pharmacist  [phone] (2pm-midnight daily)    .    .            "

## 2019-05-27 NOTE — PLAN OF CARE
Problem: Physical Therapy Goal  Goal: Physical Therapy Goal  Goals to be met by: 19    Patient will increase functional independence with mobility by performin. Supine to sit with Stand-by Assistance  2. Rolling to Left and Right with Stand-by Assistance using log rolling technique   3. Sit to stand transfer with Stand-by Assistance  4. Bed to chair transfer with Stand-by Assistance using Rolling Walker  5. Gait x25 feet with Stand-by Assistance using Rolling Walker  6. Sitting at edge of bed x20 minutes with Stand-by Assistance      Patient limited in PT evaluation due to 10/10 back pain despite bring pre-medicated. She would benefit from SNF at discharge.

## 2019-05-27 NOTE — NURSING
Pt removed BiPap, reports she needed a break and requested moon boots be removed.  Pt. Continues to complain of generalized itching, no rash, only minimal relief from benadryl. Pt. Reports pain not controlled.

## 2019-05-27 NOTE — ASSESSMENT & PLAN NOTE
58 yo female s/p mechanical fall with superior L2 compression fracture.   Small bilateral EDH on MRI adjacent to pedicles at L2 with mild thecal sac compression.  L-spine Ct w/ stable superior endplate compression fracture  Per NSGY,    No acute neurosurgical intervention   TLSO brace when out of bed more than 5 minutes or prn for comfort while in bed   Will arrange for pt to follow up in six weeks in outpatient clinic with interval plain films to confirm healing fracture   Cleared for discharge per neurosurgical perspective upon completion of baseline plain films  Previous DEXA w/ osteopenia; initiating Ca and Vit supplements    Due to persistent pain, will start Oxy-IR 5 mg Q6 hrs for severe pain.   PT/OT recommending SNF

## 2019-05-28 PROBLEM — E87.70 VOLUME OVERLOAD: Status: RESOLVED | Noted: 2019-02-07 | Resolved: 2019-05-28

## 2019-05-28 PROBLEM — E11.22 TYPE 2 DIABETES MELLITUS WITH STAGE 3 CHRONIC KIDNEY DISEASE, WITHOUT LONG-TERM CURRENT USE OF INSULIN: Status: ACTIVE | Noted: 2018-04-09

## 2019-05-28 PROBLEM — I50.32 CHRONIC DIASTOLIC HEART FAILURE: Status: ACTIVE | Noted: 2019-02-07

## 2019-05-28 PROBLEM — J96.01 ACUTE RESPIRATORY FAILURE WITH HYPOXIA AND HYPERCAPNIA: Status: ACTIVE | Noted: 2019-05-26

## 2019-05-28 PROBLEM — N18.30 TYPE 2 DIABETES MELLITUS WITH STAGE 3 CHRONIC KIDNEY DISEASE, WITHOUT LONG-TERM CURRENT USE OF INSULIN: Status: ACTIVE | Noted: 2018-04-09

## 2019-05-28 PROBLEM — J18.9 CAP (COMMUNITY ACQUIRED PNEUMONIA): Status: ACTIVE | Noted: 2019-05-28

## 2019-05-28 LAB
ALBUMIN SERPL BCP-MCNC: 2.4 G/DL (ref 3.5–5.2)
ALP SERPL-CCNC: 82 U/L (ref 55–135)
ALT SERPL W/O P-5'-P-CCNC: 10 U/L (ref 10–44)
AMORPH CRY UR QL COMP ASSIST: ABNORMAL
ANION GAP SERPL CALC-SCNC: 11 MMOL/L (ref 8–16)
AST SERPL-CCNC: 10 U/L (ref 10–40)
BACTERIA #/AREA URNS AUTO: ABNORMAL /HPF
BASOPHILS # BLD AUTO: 0.06 K/UL (ref 0–0.2)
BASOPHILS NFR BLD: 0.6 % (ref 0–1.9)
BILIRUB SERPL-MCNC: 0.2 MG/DL (ref 0.1–1)
BILIRUB UR QL STRIP: NEGATIVE
BUN SERPL-MCNC: 39 MG/DL (ref 6–20)
CALCIUM SERPL-MCNC: 9.2 MG/DL (ref 8.7–10.5)
CHLORIDE SERPL-SCNC: 105 MMOL/L (ref 95–110)
CLARITY UR REFRACT.AUTO: ABNORMAL
CO2 SERPL-SCNC: 20 MMOL/L (ref 23–29)
COLOR UR AUTO: YELLOW
CREAT SERPL-MCNC: 2.8 MG/DL (ref 0.5–1.4)
CREAT UR-MCNC: 67 MG/DL (ref 15–325)
DIFFERENTIAL METHOD: ABNORMAL
EOSINOPHIL # BLD AUTO: 0.2 K/UL (ref 0–0.5)
EOSINOPHIL NFR BLD: 2.3 % (ref 0–8)
ERYTHROCYTE [DISTWIDTH] IN BLOOD BY AUTOMATED COUNT: 13.6 % (ref 11.5–14.5)
EST. GFR  (AFRICAN AMERICAN): 20.8 ML/MIN/1.73 M^2
EST. GFR  (NON AFRICAN AMERICAN): 18.1 ML/MIN/1.73 M^2
GLUCOSE SERPL-MCNC: 122 MG/DL (ref 70–110)
GLUCOSE UR QL STRIP: ABNORMAL
GRAN CASTS UR QL COMP ASSIST: 1 /LPF
HCT VFR BLD AUTO: 29.9 % (ref 37–48.5)
HGB BLD-MCNC: 9.8 G/DL (ref 12–16)
HGB UR QL STRIP: ABNORMAL
HYALINE CASTS UR QL AUTO: 1 /LPF
IMM GRANULOCYTES # BLD AUTO: 0.05 K/UL (ref 0–0.04)
IMM GRANULOCYTES NFR BLD AUTO: 0.5 % (ref 0–0.5)
KETONES UR QL STRIP: NEGATIVE
LEUKOCYTE ESTERASE UR QL STRIP: ABNORMAL
LYMPHOCYTES # BLD AUTO: 2 K/UL (ref 1–4.8)
LYMPHOCYTES NFR BLD: 18.9 % (ref 18–48)
MAGNESIUM SERPL-MCNC: 2 MG/DL (ref 1.6–2.6)
MCH RBC QN AUTO: 27.9 PG (ref 27–31)
MCHC RBC AUTO-ENTMCNC: 32.8 G/DL (ref 32–36)
MCV RBC AUTO: 85 FL (ref 82–98)
MICROSCOPIC COMMENT: ABNORMAL
MONOCYTES # BLD AUTO: 1.2 K/UL (ref 0.3–1)
MONOCYTES NFR BLD: 11.6 % (ref 4–15)
NEUTROPHILS # BLD AUTO: 6.9 K/UL (ref 1.8–7.7)
NEUTROPHILS NFR BLD: 66.1 % (ref 38–73)
NITRITE UR QL STRIP: NEGATIVE
NON-SQ EPI CELLS #/AREA URNS AUTO: 1 /HPF
NRBC BLD-RTO: 0 /100 WBC
PH UR STRIP: 5 [PH] (ref 5–8)
PLATELET # BLD AUTO: 122 K/UL (ref 150–350)
PMV BLD AUTO: 11.4 FL (ref 9.2–12.9)
POCT GLUCOSE: 143 MG/DL (ref 70–110)
POTASSIUM SERPL-SCNC: 4.4 MMOL/L (ref 3.5–5.1)
PROT SERPL-MCNC: 6.7 G/DL (ref 6–8.4)
PROT UR QL STRIP: ABNORMAL
PROT UR-MCNC: 92 MG/DL (ref 0–15)
PROT/CREAT UR: 1.37 MG/G{CREAT} (ref 0–0.2)
RBC # BLD AUTO: 3.51 M/UL (ref 4–5.4)
RBC #/AREA URNS AUTO: 11 /HPF (ref 0–4)
SODIUM SERPL-SCNC: 136 MMOL/L (ref 136–145)
SP GR UR STRIP: 1.01 (ref 1–1.03)
SQUAMOUS #/AREA URNS AUTO: 1 /HPF
TB INDURATION 48 - 72 HR READ: 72 MM
URN SPEC COLLECT METH UR: ABNORMAL
WBC # BLD AUTO: 10.44 K/UL (ref 3.9–12.7)
WBC #/AREA URNS AUTO: >100 /HPF (ref 0–5)
WBC CLUMPS UR QL AUTO: ABNORMAL

## 2019-05-28 PROCEDURE — 99232 PR SUBSEQUENT HOSPITAL CARE,LEVL II: ICD-10-PCS | Mod: GC,,, | Performed by: HOSPITALIST

## 2019-05-28 PROCEDURE — 25000003 PHARM REV CODE 250: Performed by: INTERNAL MEDICINE

## 2019-05-28 PROCEDURE — 25000003 PHARM REV CODE 250: Performed by: STUDENT IN AN ORGANIZED HEALTH CARE EDUCATION/TRAINING PROGRAM

## 2019-05-28 PROCEDURE — 80053 COMPREHEN METABOLIC PANEL: CPT

## 2019-05-28 PROCEDURE — 99232 SBSQ HOSP IP/OBS MODERATE 35: CPT | Mod: GC,,, | Performed by: INTERNAL MEDICINE

## 2019-05-28 PROCEDURE — 94761 N-INVAS EAR/PLS OXIMETRY MLT: CPT

## 2019-05-28 PROCEDURE — 36415 COLL VENOUS BLD VENIPUNCTURE: CPT

## 2019-05-28 PROCEDURE — 99232 PR SUBSEQUENT HOSPITAL CARE,LEVL II: ICD-10-PCS | Mod: GC,,, | Performed by: INTERNAL MEDICINE

## 2019-05-28 PROCEDURE — 85025 COMPLETE CBC W/AUTO DIFF WBC: CPT

## 2019-05-28 PROCEDURE — 99232 SBSQ HOSP IP/OBS MODERATE 35: CPT | Mod: GC,,, | Performed by: HOSPITALIST

## 2019-05-28 PROCEDURE — 11000001 HC ACUTE MED/SURG PRIVATE ROOM

## 2019-05-28 PROCEDURE — 63600175 PHARM REV CODE 636 W HCPCS: Performed by: INTERNAL MEDICINE

## 2019-05-28 PROCEDURE — 97530 THERAPEUTIC ACTIVITIES: CPT

## 2019-05-28 PROCEDURE — 83735 ASSAY OF MAGNESIUM: CPT

## 2019-05-28 RX ORDER — CEFTRIAXONE 1 G/1
1 INJECTION, POWDER, FOR SOLUTION INTRAMUSCULAR; INTRAVENOUS
Status: COMPLETED | OUTPATIENT
Start: 2019-05-29 | End: 2019-05-30

## 2019-05-28 RX ORDER — LEVOTHYROXINE SODIUM 100 UG/1
200 TABLET ORAL
Status: DISCONTINUED | OUTPATIENT
Start: 2019-05-29 | End: 2019-05-30 | Stop reason: HOSPADM

## 2019-05-28 RX ORDER — ASPIRIN 81 MG/1
81 TABLET ORAL DAILY
Status: DISCONTINUED | OUTPATIENT
Start: 2019-05-28 | End: 2019-05-30 | Stop reason: HOSPADM

## 2019-05-28 RX ADMIN — HEPARIN SODIUM 7500 UNITS: 5000 INJECTION, SOLUTION INTRAVENOUS; SUBCUTANEOUS at 09:05

## 2019-05-28 RX ADMIN — ONDANSETRON 8 MG: 8 TABLET, ORALLY DISINTEGRATING ORAL at 06:05

## 2019-05-28 RX ADMIN — AMLODIPINE BESYLATE 10 MG: 10 TABLET ORAL at 08:05

## 2019-05-28 RX ADMIN — SENNOSIDES AND DOCUSATE SODIUM 1 TABLET: 8.6; 5 TABLET ORAL at 09:05

## 2019-05-28 RX ADMIN — OXYCODONE HYDROCHLORIDE 5 MG: 5 TABLET ORAL at 06:05

## 2019-05-28 RX ADMIN — HEPARIN SODIUM 7500 UNITS: 5000 INJECTION, SOLUTION INTRAVENOUS; SUBCUTANEOUS at 01:05

## 2019-05-28 RX ADMIN — ESCITALOPRAM OXALATE 10 MG: 5 TABLET, FILM COATED ORAL at 08:05

## 2019-05-28 RX ADMIN — CARVEDILOL 25 MG: 25 TABLET, FILM COATED ORAL at 04:05

## 2019-05-28 RX ADMIN — CEFEPIME 2 G: 2 INJECTION, POWDER, FOR SOLUTION INTRAVENOUS at 08:05

## 2019-05-28 RX ADMIN — VITAMIN D, TAB 1000IU (100/BT) 1000 UNITS: 25 TAB at 08:05

## 2019-05-28 RX ADMIN — TRAMADOL HYDROCHLORIDE 50 MG: 50 TABLET, FILM COATED ORAL at 09:05

## 2019-05-28 RX ADMIN — OXYCODONE HYDROCHLORIDE 5 MG: 5 TABLET ORAL at 01:05

## 2019-05-28 RX ADMIN — ASPIRIN 81 MG: 81 TABLET, COATED ORAL at 11:05

## 2019-05-28 RX ADMIN — CARVEDILOL 25 MG: 25 TABLET, FILM COATED ORAL at 08:05

## 2019-05-28 RX ADMIN — HEPARIN SODIUM 7500 UNITS: 5000 INJECTION, SOLUTION INTRAVENOUS; SUBCUTANEOUS at 06:05

## 2019-05-28 RX ADMIN — SENNOSIDES AND DOCUSATE SODIUM 1 TABLET: 8.6; 5 TABLET ORAL at 08:05

## 2019-05-28 RX ADMIN — LEVOTHYROXINE SODIUM 200 MCG: 100 TABLET ORAL at 08:05

## 2019-05-28 RX ADMIN — POLYETHYLENE GLYCOL 3350 17 G: 17 POWDER, FOR SOLUTION ORAL at 09:05

## 2019-05-28 RX ADMIN — PANTOPRAZOLE SODIUM 40 MG: 40 TABLET, DELAYED RELEASE ORAL at 08:05

## 2019-05-28 RX ADMIN — ATORVASTATIN CALCIUM 10 MG: 10 TABLET, FILM COATED ORAL at 08:05

## 2019-05-28 NOTE — ASSESSMENT & PLAN NOTE
60 yo female s/p mechanical fall with superior L2 compression fracture.   Small bilateral EDH on MRI adjacent to pedicles at L2 with mild thecal sac compression.  L-spine Ct w/ stable superior endplate compression fracture  Per NSGY,    No acute neurosurgical intervention   TLSO brace when out of bed more than 5 minutes or prn for comfort while in bed   Will arrange for pt to follow up in six weeks in outpatient clinic with interval plain films to confirm healing fracture   Cleared for discharge per neurosurgical perspective upon completion of baseline plain films  Previous DEXA w/ osteopenia; initiating Ca and Vit supplements    Due to persistent pain, will start Oxy-IR 5 mg Q6 hrs for severe pain.   PT/OT recommending SNF

## 2019-05-28 NOTE — PROGRESS NOTES
[facility]  Nephrology  Progress Note    Patient Name: Kathie Quezada  MRN: [id number]  Admission Date: 5/23/2019  Hospital Length of Stay: 4 days  Attending Provider: Tracy Barnes MD   Primary Care Physician: Elan Price MD  Principal Problem:ARUNA (acute kidney injury)    Subjective:     HPI: 60 y/o Black or -American woman with CKD3b (baseline sCr 1.6-1.7), HFpEF, DM2, HTN, dyslipidemia, hypothyroidism, GERD, hx of CVA no residual deficits, hx HCV s/p Harvoni (2015), [place] [facility] and found to have an L2 compression fracture with bilateral epidural hematomas.  She arrived with sCr at baseline 1.7 on 5/24, but subsequently increased to 2 on 5/25, 2.3 on 5/26, and 3.4 on 5/27.  At the ER on 5/24, patient presented with urinary retention > 999 mL in bladder scan.  I/O cath.  Again developed urinary retention next day, and placed on mear catheter since 5/25.    Nephrology consulted for evaluation of Aruna on CKD.    Interval History: Patient evaluated bedside, stable vital signs.  Night uneventful.  Has maintained good urine output.    Review of patient's allergies indicates:   Allergen Reactions    Bactrim [sulfamethoxazole-trimethoprim] Hives    Codeine Hives    Penicillins Hives    Shellfish containing products Itching    Sulfa (sulfonamide antibiotics) Hives     Current Facility-Administered Medications   Medication Frequency    acetaminophen tablet 650 mg Q4H PRN    amLODIPine tablet 10 mg Daily    aspirin EC tablet 81 mg Daily    atorvastatin tablet 10 mg Daily    carvedilol tablet 25 mg BID WM    [START ON 5/29/2019] cefTRIAXone injection 1 g Q24H    dextrose 50% injection 12.5 g PRN    dextrose 50% injection 25 g PRN    diphenhydrAMINE capsule 50 mg Q6H PRN    escitalopram oxalate tablet 10 mg Daily    glucagon (human recombinant) injection 1 mg PRN    glucose chewable tablet 16 g PRN    glucose chewable tablet 24 g PRN     heparin (porcine) injection 7,500 Units Q8H    insulin aspart U-100 pen 0-5 Units QID (AC + HS) PRN    [START ON 5/29/2019] levothyroxine tablet 200 mcg Before breakfast    naloxone 0.4 mg/mL injection 0.4 mg PRN    ondansetron disintegrating tablet 8 mg Q6H PRN    oxyCODONE immediate release tablet 5 mg Q6H PRN    pantoprazole EC tablet 40 mg Daily    polyethylene glycol packet 17 g BID    senna-docusate 8.6-50 mg per tablet 1 tablet BID PRN    senna-docusate 8.6-50 mg per tablet 1 tablet BID    sodium chloride 0.9% flush 10 mL PRN    sodium chloride 0.9% flush 10 mL PRN    traMADol tablet 50 mg BID PRN    vitamin D 1000 units tablet 1,000 Units Daily       Objective:     Vital Signs (Most Recent):  Temp: 97.2 °F (36.2 °C) (05/28/19 1142)  Pulse: 69 (05/28/19 1142)  Resp: 17 (05/28/19 1142)  BP: (!) 140/67 (05/28/19 1142)  SpO2: (!) 94 % (05/28/19 1142)  O2 Device (Oxygen Therapy): nasal cannula (05/28/19 0509) Vital Signs (24h Range):  Temp:  [97.2 °F (36.2 °C)-98.8 °F (37.1 °C)] 97.2 °F (36.2 °C)  Pulse:  [67-79] 69  Resp:  [17-18] 17  SpO2:  [94 %-98 %] 94 %  BP: (133-165)/(65-70) 140/67     Weight: 113.4 kg (250 lb) (05/23/19 1903)  Body mass index is 45.73 kg/m².  Body surface area is 2.23 meters squared.    I/O last 3 completed shifts:  In: 1250 [P.O.:1250]  Out: 3025 [Urine:3025]    Physical Exam   Constitutional: She is oriented to person, place, and time. She appears well-developed and well-nourished. No distress.   HENT:   Head: Normocephalic and atraumatic.   Mouth/Throat: No oropharyngeal exudate.   Eyes: Pupils are equal, round, and reactive to light. Conjunctivae and EOM are normal. No scleral icterus.   Neck: Normal range of motion. Neck supple. No JVD present. No thyromegaly present.   Cardiovascular: Normal rate and regular rhythm.   Pulmonary/Chest: Effort normal and breath sounds normal. No respiratory distress. She has no wheezes. She exhibits no tenderness.   Abdominal: Soft. Bowel  sounds are normal. There is tenderness. There is no guarding.   Genitourinary:   Genitourinary Comments: Sheth in place   Musculoskeletal: She exhibits tenderness (flank and back). She exhibits no edema.   Lymphadenopathy:     She has no cervical adenopathy.   Neurological: She is oriented to person, place, and time. No sensory deficit.   BL UE strength 5/5  BL LE strength 4/5   Skin: Skin is warm. No rash noted.   Nursing note and vitals reviewed.      Significant Labs:  CBC:   Recent Labs   Lab 05/28/19  0535   WBC 10.44   RBC 3.51*   HGB 9.8*   HCT 29.9*   *   MCV 85   MCH 27.9   MCHC 32.8     CMP:   Recent Labs   Lab 05/28/19  0535   *   CALCIUM 9.2   ALBUMIN 2.4*   PROT 6.7      K 4.4   CO2 20*      BUN 39*   CREATININE 2.8*   ALKPHOS 82   ALT 10   AST 10   BILITOT 0.2     All labs within the past 24 hours have been reviewed.       Assessment/Plan:     * ARUNA (acute kidney injury)  Patient with acute kidney injury over chronic kidney disease baseline sCr 1.6-1.7.  Patient with urinary retention, also hemodynamic instability with elevated blood pressures ~160-180s/80-90s for several days and sudden drop to 90s/50s, causing prerenal ischemic ATN.  Patient also with febrile episodes concern for infectious etiology (could explain her sudden onset of low blood pressures, along with opioid use for pain control).  While this was all going on and ongoing ARUNA, patient received aggressive Vancomycin dose, vanc random level after ~13 hrs at supratherapeutic levels 5/27.      Plan:  - Urine sediment by MD 5/27/2019: some muddy brown casts, abundant WBCs, abundant hyaline casts  - F/u Urine culture (patient on IV abx Cefepime and Vancomycin)  - Careful with vanc levels, specially in view of sCr improving today  - Renal function panel daily   - Strict I/Os and chart  - MAP > 65  - Hb > 7 g/dL  - Avoid nephrotoxic medications, NSAIDs, IV contrast, etc.  - Will follow closely  - Thus far, no RRT needed  at this time    CKD stage G3b/A3, GFR 30-44 and albumin creatinine ratio >300 mg/g  - CKD3b (baseline sCr 1.6-1.7)  - Refer to Eliezer    Closed compression fracture of L2 lumbar vertebra  - Managed by primary team        Thank you for your consult. I will follow-up with patient. Please contact us if you have any additional questions.    Chris Houston MD  Nephrology  [facility]

## 2019-05-28 NOTE — SUBJECTIVE & OBJECTIVE
Interval History: Patient evaluated bedside, stable vital signs.  Night uneventful.  Has maintained good urine output.    Review of patient's allergies indicates:   Allergen Reactions    Bactrim [sulfamethoxazole-trimethoprim] Hives    Codeine Hives    Penicillins Hives    Shellfish containing products Itching    Sulfa (sulfonamide antibiotics) Hives     Current Facility-Administered Medications   Medication Frequency    acetaminophen tablet 650 mg Q4H PRN    amLODIPine tablet 10 mg Daily    aspirin EC tablet 81 mg Daily    atorvastatin tablet 10 mg Daily    carvedilol tablet 25 mg BID WM    [START ON 5/29/2019] cefTRIAXone injection 1 g Q24H    dextrose 50% injection 12.5 g PRN    dextrose 50% injection 25 g PRN    diphenhydrAMINE capsule 50 mg Q6H PRN    escitalopram oxalate tablet 10 mg Daily    glucagon (human recombinant) injection 1 mg PRN    glucose chewable tablet 16 g PRN    glucose chewable tablet 24 g PRN    heparin (porcine) injection 7,500 Units Q8H    insulin aspart U-100 pen 0-5 Units QID (AC + HS) PRN    [START ON 5/29/2019] levothyroxine tablet 200 mcg Before breakfast    naloxone 0.4 mg/mL injection 0.4 mg PRN    ondansetron disintegrating tablet 8 mg Q6H PRN    oxyCODONE immediate release tablet 5 mg Q6H PRN    pantoprazole EC tablet 40 mg Daily    polyethylene glycol packet 17 g BID    senna-docusate 8.6-50 mg per tablet 1 tablet BID PRN    senna-docusate 8.6-50 mg per tablet 1 tablet BID    sodium chloride 0.9% flush 10 mL PRN    sodium chloride 0.9% flush 10 mL PRN    traMADol tablet 50 mg BID PRN    vitamin D 1000 units tablet 1,000 Units Daily       Objective:     Vital Signs (Most Recent):  Temp: 97.2 °F (36.2 °C) (05/28/19 1142)  Pulse: 69 (05/28/19 1142)  Resp: 17 (05/28/19 1142)  BP: (!) 140/67 (05/28/19 1142)  SpO2: (!) 94 % (05/28/19 1142)  O2 Device (Oxygen Therapy): nasal cannula (05/28/19 3515) Vital Signs (24h Range):  Temp:  [97.2 °F (36.2 °C)-98.8  °F (37.1 °C)] 97.2 °F (36.2 °C)  Pulse:  [67-79] 69  Resp:  [17-18] 17  SpO2:  [94 %-98 %] 94 %  BP: (133-165)/(65-70) 140/67     Weight: 113.4 kg (250 lb) (05/23/19 1903)  Body mass index is 45.73 kg/m².  Body surface area is 2.23 meters squared.    I/O last 3 completed shifts:  In: 1250 [P.O.:1250]  Out: 3025 [Urine:3025]    Physical Exam   Constitutional: She is oriented to person, place, and time. She appears well-developed and well-nourished. No distress.   HENT:   Head: Normocephalic and atraumatic.   Mouth/Throat: No oropharyngeal exudate.   Eyes: Pupils are equal, round, and reactive to light. Conjunctivae and EOM are normal. No scleral icterus.   Neck: Normal range of motion. Neck supple. No JVD present. No thyromegaly present.   Cardiovascular: Normal rate and regular rhythm.   Pulmonary/Chest: Effort normal and breath sounds normal. No respiratory distress. She has no wheezes. She exhibits no tenderness.   Abdominal: Soft. Bowel sounds are normal. There is tenderness. There is no guarding.   Genitourinary:   Genitourinary Comments: Sheth in place   Musculoskeletal: She exhibits tenderness (flank and back). She exhibits no edema.   Lymphadenopathy:     She has no cervical adenopathy.   Neurological: She is oriented to person, place, and time. No sensory deficit.   BL UE strength 5/5  BL LE strength 4/5   Skin: Skin is warm. No rash noted.   Nursing note and vitals reviewed.      Significant Labs:  CBC:   Recent Labs   Lab 05/28/19  0535   WBC 10.44   RBC 3.51*   HGB 9.8*   HCT 29.9*   *   MCV 85   MCH 27.9   MCHC 32.8     CMP:   Recent Labs   Lab 05/28/19  0535   *   CALCIUM 9.2   ALBUMIN 2.4*   PROT 6.7      K 4.4   CO2 20*      BUN 39*   CREATININE 2.8*   ALKPHOS 82   ALT 10   AST 10   BILITOT 0.2     All labs within the past 24 hours have been reviewed.

## 2019-05-28 NOTE — PLAN OF CARE
Problem: Occupational Therapy Goal  Goal: Occupational Therapy Goal  Goals to be met by: 6/8/19     Patient will increase functional independence with ADLs by performing:    UE Dressing with [place].  LE Dressing with Modified [place] and Assistive Devices as needed.  Grooming while standing at sink with Modified [place].  Toileting from bedside commode with Modified [place] for hygiene and clothing management.   Bathing from  edge of bed with Modified [place].  Toilet transfer to bedside commode with Modified [place].  Increased functional strength to WFL for B UE.  Upper extremity exercise program x15 reps per handout, with independence.     Cont. POC.

## 2019-05-28 NOTE — PROGRESS NOTES
"[facility] Hospital Medicine  Progress Note    Patient Name: Kathie Quezada  MRN: [id number]  Patient Class: IP- Inpatient   Admission Date: 5/23/2019  Length of Stay: 4 days  Attending Physician: Tracy Barnes MD  Primary Care Provider: Elan Price MD    [facility] Medicine Team: [facility] HOSP MED 4 Mita Ceron MD    Subjective:     Principal Problem:ARUNA (acute kidney injury)    HPI:  Kathie Quezada is a 58 y.o. woman with a history of HFpEF (EF: 60% 1/19), CKD Stage G3a/A3 c/b Nephrotic Range Proteinuria, DM2 (A1c: 5.2%) not taking Insulin at home, HTN, HLD, Hypothyroidism (s/p Thyroidectomy), GERD, Hx of CVA (no residual deficits) and Hx HCV s/p Harvoni (2015) with reported undetectable VL, and chronic back pain 2/2 T12 fracture who presents to [facility] ED via EMS after a fall at home. Pt reports picking up her grand-daughter, becoming unbalanced, and falling on her back. She denies any dizziness, lightheadedness, or other pre-syncopal symptoms prior to the fall. She also denies any head trauma. However, she notes a "crack" upon her impact with the ground in addition to the onset of pain. She also had "no feeling in my legs" and experienced a loss of sensation until EMS arrived at the scene. Pt denies any recent exacerbations related to her chronic co-morbidities. Her baseline back pain is about a 5/10 and she takes no medication for it; her T12 fracture occurred approx 3 years ago. She denies any recent exacerbations in terms of SOB, leg swelling, and BP control. She further denies any F/C, N/V, diarrhea, constipation, dysuria, CP, palpitations.     While in the ED, Pt was reported to complain of urinary retention. A bladder scan revealed >1L and Pt had UOP of 1200cc w/ insertion of a urinary catheter. Pt is uncertain if it was her pain that limited her ability to urinate. She has not had issues with urinary retention, incontinence, decreased stream, straining to urinate " previously.     Hospital Course:  60yo woman with HFpEF, CKD3, DM2 not taking insulin at home with peripheral neuropathy, HTN, HLD, hypothyroidism (s/p thyroidectomy), GERD, hx of CVA (no residual deficits), hx of HCV s/p Harvoni (2015) with reported undetectable VL, and chronic back pain 2/2 T12 fracture who presents following a mechanical fall at home, found to have acute L2 compression fracture with small bilateral epidural hematoma. Initially with bilateral lower extremity weakness and concern for urinary retention s/p mera placement in ED. MRI and CT imaging obtained; per NSGY low suspicion for compressive polyradiculopathy. TLSO brace in place when OOB.     Admitted to medicine for pain control, PT/OT evaluation. On 5/26, developed acute hypoxic/hypercapnic respiratory failure, likely 2/2 accidental opioid overdose with undiagnosed SERA and restriction from brace. Improved with narcan and BiPAP. Also found to have fevers to 103. Unclear precipitant of fevers, potentially PNA. Initiated on BSAbx and re-cultured; de-escalating to ceftriaxone for 5 day course. Does have ARUNA on CKD3 which has worsened over past few days, now peaking. Possibly 2/2 retention vs drug induced vs ischemic (did have recorded low BP during rapid response on 5/26, although this may have been 2/2 poor cuff fit).    Ultimately, she will need 6 week NSGY f/u with repeat plain films to confirm healing and continue TLSO brace. PT/OT recommend SNF.    Interval History: Used BIPAP overnight. This morning complaining of back pain. Has yet to have BM.     Review of Systems   Constitutional: Negative for appetite change, chills, diaphoresis and fever.   HENT: Negative for congestion, rhinorrhea, sneezing and sore throat.    Eyes: Negative for visual disturbance.   Respiratory: Negative for cough, shortness of breath and wheezing.    Cardiovascular: Negative for chest pain, palpitations and leg swelling.   Gastrointestinal: Positive for abdominal  distention, abdominal pain and constipation. Negative for blood in stool, diarrhea, nausea and vomiting.   Genitourinary: Positive for flank pain. Negative for dysuria.   Musculoskeletal: Positive for back pain.   Skin: Negative for rash and wound.   Neurological: Negative for dizziness and light-headedness.     Objective:     Vital Signs (Most Recent):  Temp: 97.2 °F (36.2 °C) (05/28/19 1142)  Pulse: 69 (05/28/19 1142)  Resp: 17 (05/28/19 1142)  BP: (!) 140/67 (05/28/19 1142)  SpO2: (!) 94 % (05/28/19 1142) Vital Signs (24h Range):  Temp:  [97.2 °F (36.2 °C)-98.8 °F (37.1 °C)] 97.2 °F (36.2 °C)  Pulse:  [67-79] 69  Resp:  [17-18] 17  SpO2:  [94 %-98 %] 94 %  BP: (133-165)/(65-70) 140/67     Weight: 113.4 kg (250 lb)  Body mass index is 45.73 kg/m².    Intake/Output Summary (Last 24 hours) at 5/28/2019 1427  Last data filed at 5/28/2019 0900  Gross per 24 hour   Intake 300 ml   Output 1300 ml   Net -1000 ml      Physical Exam   Constitutional: She is oriented to person, place, and time. No distress.   HENT:   Head: Normocephalic and atraumatic.   Right Ear: External ear normal.   Left Ear: External ear normal.   Mouth/Throat: Oropharynx is clear and moist. No oropharyngeal exudate.   Eyes: Pupils are equal, round, and reactive to light. Conjunctivae and EOM are normal. No scleral icterus.   Neck: Normal range of motion. Neck supple. No JVD present. No thyromegaly present.   Cardiovascular: Normal rate, regular rhythm, normal heart sounds and intact distal pulses. Exam reveals no gallop and no friction rub.   No murmur heard.  Pulmonary/Chest: Effort normal and breath sounds normal. No respiratory distress. She has no wheezes. She exhibits no tenderness.   Abdominal: Soft. Bowel sounds are normal. There is tenderness. There is no guarding.   Genitourinary:   Genitourinary Comments: Sheth in place   Musculoskeletal: She exhibits tenderness (flank and back). She exhibits no edema.   Lymphadenopathy:     She has no  "cervical adenopathy.   Neurological: She is oriented to person, place, and time. No sensory deficit.   BL UE strength 5/5  BL LE strength 4/5   Skin: Skin is warm. No rash noted.   Vitals reviewed.      Significant Labs: All pertinent labs within the past 24 hours have been reviewed.    Significant Imaging: I have reviewed all pertinent imaging results/findings within the past 24 hours.    Assessment/Plan:      * ARUNA (acute kidney injury)  See CKD      Closed compression fracture of L2 lumbar vertebra  58 yo female s/p mechanical fall with superior L2 compression fracture.   Small bilateral EDH on MRI adjacent to pedicles at L2 with mild thecal sac compression.  L-spine Ct w/ stable superior endplate compression fracture  Per NSGY,    No acute neurosurgical intervention   TLSO brace when out of bed more than 5 minutes or prn for comfort while in bed   Will arrange for pt to follow up in six weeks in outpatient clinic with interval plain films to confirm healing fracture   Cleared for discharge per neurosurgical perspective upon completion of baseline plain films  Previous DEXA w/ osteopenia; initiating Ca and Vit supplements    Due to persistent pain, will start Oxy-IR 5 mg Q6 hrs for severe pain.   PT/OT recommending SNF       Acute urinary retention  58 yo female s/p mechanical fall with superior L2 compression fracture.  After presentation to ED, c/o of urinary retnetion w/ bladder scan significant for >1L retained urine and UOP w/ mera approx 1200cc.  Pt denies previous issue with urinary of bowel incontinence or retention.   Potential etiologies of acute retention include spinal compression, pain, pain medication received upon admission, UTI.   Per NSGY, "degree of canal compression not typical for development of acute severe polyradiculopathy."    UTI negative for infection.   Renal U/S w/o acute process  Oxy is available for pain control but try to use non-narcotics  Neuro-checks q4 hours  Voiding trial " today    Pneumonia  See Fever      Slow transit constipation  Scheduled miralax and senna-docusate       Acute respiratory failure with hypoxia and hypercapnia  RR called on Pt 5/26 in setting of AMS w/ increased lethargy and concern for airway protection.   Pt with hx of brain bleed, ESRD, fevers. Pt received 30mg oxycodone in 9 hours. GCS initially 4, improved to 14 after narcan administration and jaw thrust. Pt requiring repeat PRN doses of narcan.      -etiology likely combo of C02 retention in setting of brace and pain inhibiting full respirations and pain medication administration in setting renal injury    -contact MICU if not requiring more doses of narcan, will consider narcan drip if needed.   -low threshold to admit to MICU if somnolence and narcan necessity continues.  -d/c narcotics   AMS resolved s/p narcan during Rapid Response and BiPAPqhs, continue to monitor closely and continue BiPAP with backup rate to ensure pt does not become apneic. Goal sats 88-92%.     Altered mental status  RR called on Pt 5/26 in setting of AMS w/ increased lethargy and concern for airway protection.   Pt with hx of brain bleed, ESRD, fevers. Pt received 30mg oxycodone in 9 hours. GCS initially 4, improved to 14 after narcan administration and jaw thrust. Pt requiring repeat PRN doses of narcan.      -etiology likely combo of C02 retention in setting of brace and pain inhibiting full respirations and pain medication administration in setting renal injury    -contact MICU if not requiring more doses of narcan, will consider narcan drip if needed.   -low threshold to admit to MICU if somnolence and narcan necessity continues.  -d/c narcotics   AMS resolved s/p narcan following Rapid Response and BiPAP qhs, continue to monitor closely      Fever  Febrile intermittently after admission  WCC peaked 15.71, now wnl after BSABx  Treating for potential PNA in setting of acute hypoxic/hypercapnic resp failure  Considerations include  inflammatory reaction in setting of fracture as well   CXR prior to RR negative for acute process, UA w/o infection, blood cx pending but low suspicion for +  Abx started following rapid response during which there was concern for hypotension, which was likely due to cuff misplacement  However, due to ongoing fevers w/o source, covered broadly with renally dosed vanc and cefepime  Today narrowing to ceftriaxone to complete 5 total day course for CAP    Hypokalemia  Admit K 3.1  Likely due to diuretic use  Replete and monitor      GERD (gastroesophageal reflux disease)  - Protonix 40 mg daily      Hypothyroidism  - Synthroid 200 mcg daily  - Recent TSH/FT4 wnl's    Factor XI deficiency  Initially holding lovenox and SCDs for DVT ppx  However, in setting of ongoing fevers and respiratory decompensation 5/27 concern for embolus  BL LE U/S negative  D-dimer 1.24, unable to pursue CTA and V/Q scan at this time due to ARUNA and Pt status respectively   Stated heparin 7500U q8 hours      Chronic diastolic heart failure  Pt euvolemic on exam.   No c/o of SOB, CP, palpitations.   Holding home lasix 40mg BID in setting of potential ARUNA.       CKD stage G3b/A3, GFR 30-44 and albumin creatinine ratio >300 mg/g  Previous extensive CKD workup indicates that HTN and DM2 etiologies of CKD (JB, cryoglobulins, SPEP/UPEP, ANCA's, and complement all wnl's)  Baseline creatinine 1.6/1.7. Elevation during admission. Peak 3.4.  - Strict I/Os and chart  - MAP > 65  - Hb > 7 g/dL  - Avoid nephrotoxic medications, NSAIDs, IV contrast, etc    Cr improving today; 2.8  Voiding trial  Holding lasix and lisinopril  Continue to monitor RF    Hyperlipidemia  Continue home statin  Previous CVA hx, no residual side effects      HTN (hypertension)  On arrival Pt w/ BP 170s/80s. Per Pt, baseline on home medication closer to SBPs 130s-140s.  Home medications include: amlodipine 10mg qd, coreg 25mg bid, lisinopril 20mg qd. Also hydralazine 25mg for  SBP>160.  Present values likely due to acute fracture pain. Will optimize pain regimen.   Continue home medications; titrate as necessary.        Type 2 diabetes mellitus with stage 3 chronic kidney disease, without long-term current use of insulin  -low dose SSI, not taking Insulin at home and most recent A1c at goal  -diabetic diet      Postoperative hypothyroidism  Continue home synthroid        VTE Risk Mitigation (From admission, onward)        Ordered     heparin (porcine) injection 7,500 Units  Every 8 hours      05/26/19 1338     IP VTE HIGH RISK PATIENT  Once      05/25/19 0553     Place EMILIA hose  Until discontinued      05/25/19 0553     Place sequential compression device  Until discontinued      05/24/19 6734              Mita Ceron MD   IM, OGY-1  Department of Hospital Medicine   [facility]

## 2019-05-28 NOTE — PLAN OF CARE
Problem: Adult Inpatient Plan of Care  Goal: Plan of Care Review  Outcome: Ongoing (interventions implemented as appropriate)  Patient resting in bed comfortably. IV intact and infusing free of infection and irration,  fall precautions maintained no falls noted. Call light in reach bed locked and in lowest position. Non skid socks on while out of bed. Patient instructed to call for assistance. Skin integrity maintained as patient is independent with positioning and assisted frequently, C/o pain managed with PRN meds, No other complaints or concerns. Sheth removed, Will continue to monitor and follow careplan of care.

## 2019-05-28 NOTE — ASSESSMENT & PLAN NOTE
"58 yo female s/p mechanical fall with superior L2 compression fracture.  After presentation to ED, c/o of urinary retnetion w/ bladder scan significant for >1L retained urine and UOP w/ mera approx 1200cc.  Pt denies previous issue with urinary of bowel incontinence or retention.   Potential etiologies of acute retention include spinal compression, pain, pain medication received upon admission, UTI.   Per NSGY, "degree of canal compression not typical for development of acute severe polyradiculopathy."    UTI negative for infection.   Renal U/S w/o acute process  Oxy is available for pain control but try to use non-narcotics  Neuro-checks q4 hours  Voiding trial today  "

## 2019-05-28 NOTE — CONSULTS
[facility]  Nephrology  Consult Note    Patient Name: Kathie Quezada  MRN: [id number]  Admission Date: 5/23/2019  Hospital Length of Stay: 3 days  Attending Provider: Frank Brand, *   Primary Care Physician: Elan Price MD  Principal Problem:<principal problem not specified>    Inpatient consult to Nephrology  Consult performed by: Chris Houston MD  Consult ordered by: Ramone Blanco MD        Subjective:     HPI: 60 y/o Black or -American woman with CKD3b (baseline sCr 1.6-1.7), HFpEF, DM2, HTN, dyslipidemia, hypothyroidism, GERD, hx of CVA no residual deficits, hx HCV s/p Harvoni (2015), admitted to [facility] after a mechanical fall and found to have an L2 compression fracture with bilateral epidural hematomas.  She arrived with sCr at baseline 1.7 on 5/24, but subsequently increased to 2 on 5/25, 2.3 on 5/26, and 3.4 on 5/27.  At the ER on 5/24, patient presented with urinary retention > 999 mL in bladder scan.  I/O cath.  Again developed urinary retention next day, and placed on mera catheter since 5/25.    Nephrology consulted for evaluation of Eliezer on CKD.    Past Medical History:   Diagnosis Date    CHF (congestive heart failure)     CKD stage G3a/A3, GFR 45-59 and albumin creatinine ratio >300 mg/g 5/16/2018    CVA (cerebral vascular accident) 2017    Essential hypertension 4/9/2018    Factor XI deficiency 1978    GIB (gastrointestinal bleeding) 10/18/2018    History of hepatitis C     s/p succesful Rx w/ Harvoni by Dr Herbert  - SVR12 (cure) 2016    Hyperlipidemia     Mixed hyperlipidemia 4/9/2018    Postoperative hypothyroidism     Proteinuria 4/9/2018    Type 2 diabetes mellitus with circulatory disorder, with long-term current use of insulin 4/9/2018       Past Surgical History:   Procedure Laterality Date    BACK SURGERY      COLONOSCOPY  11/13/2015    Dr Chino Herbert - brian prep. internal hemorrhoids. diverticulosis of sigmois. NO  polyps. repeat due 2025    galbladder      HYSTERECTOMY      OOPHORECTOMY      one    TONSILLECTOMY         Review of patient's allergies indicates:   Allergen Reactions    Bactrim [sulfamethoxazole-trimethoprim] Hives    Codeine Hives    Penicillins Hives    Shellfish containing products Itching    Sulfa (sulfonamide antibiotics) Hives     Current Facility-Administered Medications   Medication Frequency    acetaminophen tablet 650 mg Q4H PRN    amLODIPine tablet 10 mg Daily    atorvastatin tablet 10 mg Daily    carvedilol tablet 25 mg BID WM    ceFEPIme injection 2 g Daily    dextrose 50% injection 12.5 g PRN    dextrose 50% injection 25 g PRN    diphenhydrAMINE capsule 50 mg Q6H PRN    escitalopram oxalate tablet 10 mg Daily    glucagon (human recombinant) injection 1 mg PRN    glucose chewable tablet 16 g PRN    glucose chewable tablet 24 g PRN    heparin (porcine) injection 7,500 Units Q8H    insulin aspart U-100 pen 0-5 Units QID (AC + HS) PRN    levothyroxine tablet 200 mcg Daily    naloxone 0.4 mg/mL injection 0.4 mg PRN    ondansetron disintegrating tablet 8 mg Q6H PRN    oxyCODONE immediate release tablet 5 mg Q6H PRN    pantoprazole EC tablet 40 mg Daily    polyethylene glycol packet 17 g BID    senna-docusate 8.6-50 mg per tablet 1 tablet BID PRN    senna-docusate 8.6-50 mg per tablet 1 tablet BID    sodium chloride 0.9% flush 10 mL PRN    sodium chloride 0.9% flush 10 mL PRN    traMADol tablet 50 mg BID PRN    vitamin D 1000 units tablet 1,000 Units Daily     Family History     Problem Relation (Age of Onset)    Diabetes type II Mother        Tobacco Use    Smoking status: Never Smoker    Smokeless tobacco: Never Used   Substance and Sexual Activity    Alcohol use: Yes     Alcohol/week: 2.4 oz     Types: 4 Cans of beer per week     Comment: weekly    Drug use: No    Sexual activity: Not on file     Review of Systems   Constitutional: Positive for diaphoresis and  fever. Negative for appetite change and chills.   HENT: Negative for congestion, rhinorrhea, sneezing and sore throat.    Eyes: Negative for visual disturbance.   Respiratory: Negative for cough, shortness of breath and wheezing.    Cardiovascular: Negative for chest pain, palpitations and leg swelling.   Gastrointestinal: Positive for abdominal distention, abdominal pain and constipation. Negative for blood in stool, diarrhea, nausea and vomiting.   Genitourinary: Positive for flank pain. Negative for dysuria.   Musculoskeletal: Positive for back pain.   Skin: Negative for rash and wound.   Neurological: Negative for dizziness and light-headedness.     Objective:     Vital Signs (Most Recent):  Temp: 98.8 °F (37.1 °C) (05/27/19 2015)  Pulse: 76 (05/27/19 2015)  Resp: 18 (05/27/19 2015)  BP: 137/65 (05/27/19 2015)  SpO2: (!) 94 % (05/27/19 2015)  O2 Device (Oxygen Therapy): room air (05/27/19 2015) Vital Signs (24h Range):  Temp:  [98 °F (36.7 °C)-98.8 °F (37.1 °C)] 98.8 °F (37.1 °C)  Pulse:  [64-79] 76  Resp:  [15-18] 18  SpO2:  [89 %-99 %] 94 %  BP: (126-153)/(63-70) 137/65     Weight: 113.4 kg (250 lb) (05/23/19 1903)  Body mass index is 45.73 kg/m².  Body surface area is 2.23 meters squared.    I/O last 3 completed shifts:  In: 1850 [P.O.:1850]  Out: 2325 [Urine:2325]    Physical Exam   Constitutional: She is oriented to person, place, and time. She appears well-developed and well-nourished. No distress.   HENT:   Head: Normocephalic and atraumatic.   Mouth/Throat: No oropharyngeal exudate.   Eyes: Pupils are equal, round, and reactive to light. Conjunctivae and EOM are normal. No scleral icterus.   Neck: Normal range of motion. Neck supple. No JVD present. No thyromegaly present.   Cardiovascular: Normal rate and regular rhythm.   Pulmonary/Chest: Effort normal and breath sounds normal. No respiratory distress. She has no wheezes. She exhibits no tenderness.   Abdominal: Soft. Bowel sounds are normal. There is  tenderness. There is no guarding.   Genitourinary:   Genitourinary Comments: Sheth in place   Musculoskeletal: She exhibits tenderness (flank and back). She exhibits no edema.   Lymphadenopathy:     She has no cervical adenopathy.   Neurological: She is oriented to person, place, and time. No sensory deficit.   BL UE strength 5/5  BL LE strength 4/5   Skin: Skin is warm. No rash noted.   Nursing note and vitals reviewed.      Significant Labs:  CBC:   Recent Labs   Lab 05/27/19  0354   WBC 14.71*   RBC 3.71*   HGB 10.4*   HCT 32.4*   *   MCV 87   MCH 28.0   MCHC 32.1     CMP:   Recent Labs   Lab 05/27/19  0354   *   CALCIUM 9.1   ALBUMIN 2.6*   PROT 7.1   *   K 4.2   CO2 16*      BUN 36*   CREATININE 3.4*   ALKPHOS 89   ALT 13   AST 12   BILITOT 0.3     All labs within the past 24 hours have been reviewed.    Significant Imaging:  CXR personally reviewed.    Assessment/Plan:     ELIEZER (acute kidney injury)  Patient with acute kidney injury over chronic kidney disease baseline sCr 1.6-1.7.  Patient with urinary retention, also hemodynamic instability with elevated blood pressures ~160-180s/80-90s for several days and sudden drop to 90s/50s, causing prerenal ischemic ATN.  Patient also with febrile episodes concern for infectious etiology (could explain her sudden onset of low blood pressures, along with opioid use for pain control).  While this was all going on and ongoing ELIEZER, patient received aggressive Vancomycin dose, vanc random level after ~13 hrs at supratherapeutic levels 5/27.      Plan:  - Urine sediment by MD  - Urine culture  - Renal function panel daily   - Strict I/Os and chart  - MAP > 65  - Hb > 7 g/dL  - Avoid nephrotoxic medications, NSAIDs, IV contrast, etc.  - Will follow closely  - Thus far, no RRT needed at this time    CKD stage G3b/A3, GFR 30-44 and albumin creatinine ratio >300 mg/g  - CKD3b (baseline sCr 1.6-1.7)  - Refer to Eliezer    Closed compression fracture of L2  lumbar vertebra  - Managed by primary team        Thank you for your consult. I will follow-up with patient. Please contact us if you have any additional questions.    Chris Houston MD  Nephrology  [facility]

## 2019-05-28 NOTE — SUBJECTIVE & OBJECTIVE
Interval History: Used BIPAP overnight. This morning complaining of back pain. Has yet to have BM.     Review of Systems   Constitutional: Negative for appetite change, chills, diaphoresis and fever.   HENT: Negative for congestion, rhinorrhea, sneezing and sore throat.    Eyes: Negative for visual disturbance.   Respiratory: Negative for cough, shortness of breath and wheezing.    Cardiovascular: Negative for chest pain, palpitations and leg swelling.   Gastrointestinal: Positive for abdominal distention, abdominal pain and constipation. Negative for blood in stool, diarrhea, nausea and vomiting.   Genitourinary: Positive for flank pain. Negative for dysuria.   Musculoskeletal: Positive for back pain.   Skin: Negative for rash and wound.   Neurological: Negative for dizziness and light-headedness.     Objective:     Vital Signs (Most Recent):  Temp: 97.2 °F (36.2 °C) (05/28/19 1142)  Pulse: 69 (05/28/19 1142)  Resp: 17 (05/28/19 1142)  BP: (!) 140/67 (05/28/19 1142)  SpO2: (!) 94 % (05/28/19 1142) Vital Signs (24h Range):  Temp:  [97.2 °F (36.2 °C)-98.8 °F (37.1 °C)] 97.2 °F (36.2 °C)  Pulse:  [67-79] 69  Resp:  [17-18] 17  SpO2:  [94 %-98 %] 94 %  BP: (133-165)/(65-70) 140/67     Weight: 113.4 kg (250 lb)  Body mass index is 45.73 kg/m².    Intake/Output Summary (Last 24 hours) at 5/28/2019 1427  Last data filed at 5/28/2019 0900  Gross per 24 hour   Intake 300 ml   Output 1300 ml   Net -1000 ml      Physical Exam   Constitutional: She is oriented to person, place, and time. No distress.   HENT:   Head: Normocephalic and atraumatic.   Right Ear: External ear normal.   Left Ear: External ear normal.   Mouth/Throat: Oropharynx is clear and moist. No oropharyngeal exudate.   Eyes: Pupils are equal, round, and reactive to light. Conjunctivae and EOM are normal. No scleral icterus.   Neck: Normal range of motion. Neck supple. No JVD present. No thyromegaly present.   Cardiovascular: Normal rate, regular rhythm, normal  heart sounds and intact distal pulses. Exam reveals no gallop and no friction rub.   No murmur heard.  Pulmonary/Chest: Effort normal and breath sounds normal. No respiratory distress. She has no wheezes. She exhibits no tenderness.   Abdominal: Soft. Bowel sounds are normal. There is tenderness. There is no guarding.   Genitourinary:   Genitourinary Comments: Sheth in place   Musculoskeletal: She exhibits tenderness (flank and back). She exhibits no edema.   Lymphadenopathy:     She has no cervical adenopathy.   Neurological: She is oriented to person, place, and time. No sensory deficit.   BL UE strength 5/5  BL LE strength 4/5   Skin: Skin is warm. No rash noted.   Vitals reviewed.      Significant Labs: All pertinent labs within the past 24 hours have been reviewed.    Significant Imaging: I have reviewed all pertinent imaging results/findings within the past 24 hours.

## 2019-05-28 NOTE — ASSESSMENT & PLAN NOTE
Patient with acute kidney injury over chronic kidney disease baseline sCr 1.6-1.7.  Patient with urinary retention, also hemodynamic instability with elevated blood pressures ~160-180s/80-90s for several days and sudden drop to 90s/50s, causing prerenal ischemic ATN.  Patient also with febrile episodes concern for infectious etiology (could explain her sudden onset of low blood pressures, along with opioid use for pain control).  While this was all going on and ongoing ARUNA, patient received aggressive Vancomycin dose, vanc random level after ~13 hrs at supratherapeutic levels 5/27.      Plan:  - Urine sediment by MD  - Urine culture  - Renal function panel daily   - Strict I/Os and chart  - MAP > 65  - Hb > 7 g/dL  - Avoid nephrotoxic medications, NSAIDs, IV contrast, etc.  - Will follow closely  - Thus far, no RRT needed at this time

## 2019-05-28 NOTE — ASSESSMENT & PLAN NOTE
- Managed by primary team   Cryotherapy Text: The wound bed was treated with cryotherapy after the biopsy was performed.

## 2019-05-28 NOTE — ASSESSMENT & PLAN NOTE
RR called on Pt 5/26 in setting of AMS w/ increased lethargy and concern for airway protection.   Pt with hx of brain bleed, ESRD, fevers. Pt received 30mg oxycodone in 9 hours. GCS initially 4, improved to 14 after narcan administration and jaw thrust. Pt requiring repeat PRN doses of narcan.      -etiology likely combo of C02 retention in setting of brace and pain inhibiting full respirations and pain medication administration in setting renal injury    -contact MICU if not requiring more doses of narcan, will consider narcan drip if needed.   -low threshold to admit to MICU if somnolence and narcan necessity continues.  -d/c narcotics   AMS resolved s/p narcan following Rapid Response and BiPAP qhs, continue to monitor closely

## 2019-05-28 NOTE — ASSESSMENT & PLAN NOTE
RR called on Pt 5/26 in setting of AMS w/ increased lethargy and concern for airway protection.   Pt with hx of brain bleed, ESRD, fevers. Pt received 30mg oxycodone in 9 hours. GCS initially 4, improved to 14 after narcan administration and jaw thrust. Pt requiring repeat PRN doses of narcan.      -etiology likely combo of C02 retention in setting of brace and pain inhibiting full respirations and pain medication administration in setting renal injury    -contact MICU if not requiring more doses of narcan, will consider narcan drip if needed.   -low threshold to admit to MICU if somnolence and narcan necessity continues.  -d/c narcotics   AMS resolved s/p narcan during Rapid Response and BiPAPqhs, continue to monitor closely and continue BiPAP with backup rate to ensure pt does not become apneic. Goal sats 88-92%.

## 2019-05-28 NOTE — ASSESSMENT & PLAN NOTE
Initially holding lovenox and SCDs for DVT ppx  However, in setting of ongoing fevers and respiratory decompensation 5/27 concern for embolus  BL LE U/S negative  D-dimer 1.24, unable to pursue CTA and V/Q scan at this time due to ARUNA and Pt status respectively   Stated heparin 7500U q8 hours

## 2019-05-28 NOTE — ASSESSMENT & PLAN NOTE
Patient with acute kidney injury over chronic kidney disease baseline sCr 1.6-1.7.  Patient with urinary retention, also hemodynamic instability with elevated blood pressures ~160-180s/80-90s for several days and sudden drop to 90s/50s, causing prerenal ischemic ATN.  Patient also with febrile episodes concern for infectious etiology (could explain her sudden onset of low blood pressures, along with opioid use for pain control).  While this was all going on and ongoing ARUNA, patient received aggressive Vancomycin dose, vanc random level after ~13 hrs at supratherapeutic levels 5/27.      Plan:  - Urine sediment by MD 5/27/2019: some muddy brown casts, abundant WBCs, abundant hyaline casts  - F/u Urine culture (patient on IV abx Cefepime and Vancomycin)  - Careful with vanc levels, specially in view of sCr improving today  - Renal function panel daily   - Strict I/Os and chart  - MAP > 65  - Hb > 7 g/dL  - Avoid nephrotoxic medications, NSAIDs, IV contrast, etc.  - Will follow closely  - Thus far, no RRT needed at this time

## 2019-05-28 NOTE — PT/OT/SLP PROGRESS
Occupational Therapy   Treatment    Name: Kathie Quezada  MRN: [id number]  Admitting Diagnosis:  ARUNA (acute kidney injury)       Recommendations:     Discharge Recommendations: nursing facility, skilled  Discharge Equipment Recommendations:  commode  Barriers to discharge:  Decreased caregiver support    Assessment:     Kathie Quezada is a 59 y.o. female with a medical diagnosis of ARUNA (acute kidney injury).  She was able to perform rolling and sidelying to sit T/F c max A  Able to tolerate sitting up on EOB for approx. 10 minutes.   Performance deficits affecting function are weakness, impaired endurance, impaired self care skills, impaired functional mobilty, orthopedic precautions.     Rehab Prognosis:  Good; patient would benefit from acute skilled OT services to address these deficits and reach maximum level of function.       Plan:     Patient to be seen 5 x/week to address the above listed problems via self-care/home management, therapeutic activities, therapeutic exercises  · Plan of Care Expires: 06/08/19  · Plan of Care Reviewed with: patient    Subjective     Pain/Comfort:  · Pain Rating 1: 10/10    Objective:     Communicated with: RN prior to session.  Patient found supine with telemetry, peripheral IV, mera catheter, pulse ox (continuous), TLSO upon OT entry to room.    General Precautions: Standard, fall   Orthopedic Precautions:spinal precautions   Braces: TLSO     Occupational Performance:     Bed Mobility:    · Patient completed Rolling/Turning to Right with maximal assistance  · Patient completed Supine to Sit with maximal assistance         Activities of Daily Living:  · Upper Body Dressing: maximal assistance to don hospital gown.      [facility] 6 Click ADL: 8        Patient left supine with all lines intact, call button in reach and RN notifiedEducation:      GOALS:   Multidisciplinary Problems     Occupational Therapy Goals        Problem: Occupational Therapy Goal    Goal Priority  Disciplines Outcome Interventions   Occupational Therapy Goal     OT, PT/OT     Description:  Goals to be met by: 6/8/19     Patient will increase functional independence with ADLs by performing:    UE Dressing with [place].  LE Dressing with Modified [place] and Assistive Devices as needed.  Grooming while standing at sink with Modified [place].  Toileting from bedside commode with Modified [place] for hygiene and clothing management.   Bathing from  edge of bed with Modified [place].  Toilet transfer to bedside commode with Modified [place].  Increased functional strength to WFL for B UE.  Upper extremity exercise program x15 reps per handout, with independence.                      Time Tracking:     OT Date of Treatment: 05/28/19  OT Start Time: 1045  OT Stop Time: 1109  OT Total Time (min): 24 min    Billable Minutes:Therapeutic Activity 24    LEIGHANN Oconnell  5/28/2019

## 2019-05-28 NOTE — PT/OT/SLP PROGRESS
Physical Therapy      Patient Name:  Kathie Quezada   MRN:  [id number]    Patient not seen today secondary to nursing care with PCT for bed bath. Will follow-up again tomorrow.    Chetna Nance, PT

## 2019-05-28 NOTE — SUBJECTIVE & OBJECTIVE
Past Medical History:   Diagnosis Date    CHF (congestive heart failure)     CKD stage G3a/A3, GFR 45-59 and albumin creatinine ratio >300 mg/g 5/16/2018    CVA (cerebral vascular accident) 2017    Essential hypertension 4/9/2018    Factor XI deficiency 1978    GIB (gastrointestinal bleeding) 10/18/2018    History of hepatitis C     s/p succesful Rx w/ [facility] by Dr Herbert  - [id number] (cure) 2016    Hyperlipidemia     Mixed hyperlipidemia 4/9/2018    Postoperative hypothyroidism     Proteinuria 4/9/2018    Type 2 diabetes mellitus with circulatory disorder, with long-term current use of insulin 4/9/2018       Past Surgical History:   Procedure Laterality Date    BACK SURGERY      COLONOSCOPY  11/13/2015    Dr Chino torres prep. internal hemorrhoids. diverticulosis of sigmois. NO polyps. repeat due 2025    galbladder      HYSTERECTOMY      OOPHORECTOMY      one    TONSILLECTOMY         Review of patient's allergies indicates:   Allergen Reactions    Bactrim [sulfamethoxazole-trimethoprim] Hives    Codeine Hives    Penicillins Hives    Shellfish containing products Itching    Sulfa (sulfonamide antibiotics) Hives     Current Facility-Administered Medications   Medication Frequency    acetaminophen tablet 650 mg Q4H PRN    amLODIPine tablet 10 mg Daily    atorvastatin tablet 10 mg Daily    carvedilol tablet 25 mg BID WM    ceFEPIme injection 2 g Daily    dextrose 50% injection 12.5 g PRN    dextrose 50% injection 25 g PRN    diphenhydrAMINE capsule 50 mg Q6H PRN    escitalopram oxalate tablet 10 mg Daily    glucagon (human recombinant) injection 1 mg PRN    glucose chewable tablet 16 g PRN    glucose chewable tablet 24 g PRN    heparin (porcine) injection 7,500 Units Q8H    insulin aspart U-100 pen 0-5 Units QID (AC + HS) PRN    levothyroxine tablet 200 mcg Daily    naloxone 0.4 mg/mL injection 0.4 mg PRN    ondansetron disintegrating tablet 8 mg Q6H PRN    oxyCODONE immediate  release tablet 5 mg Q6H PRN    pantoprazole EC tablet 40 mg Daily    polyethylene glycol packet 17 g BID    senna-docusate 8.6-50 mg per tablet 1 tablet BID PRN    senna-docusate 8.6-50 mg per tablet 1 tablet BID    sodium chloride 0.9% flush 10 mL PRN    sodium chloride 0.9% flush 10 mL PRN    traMADol tablet 50 mg BID PRN    vitamin D 1000 units tablet 1,000 Units Daily     Family History     Problem Relation (Age of Onset)    Diabetes type II Mother        Tobacco Use    Smoking status: Never Smoker    Smokeless tobacco: Never Used   Substance and Sexual Activity    Alcohol use: Yes     Alcohol/week: 2.4 oz     Types: 4 Cans of beer per week     Comment: weekly    Drug use: No    Sexual activity: Not on file     Review of Systems   Constitutional: Positive for diaphoresis and fever. Negative for appetite change and chills.   HENT: Negative for congestion, rhinorrhea, sneezing and sore throat.    Eyes: Negative for visual disturbance.   Respiratory: Negative for cough, shortness of breath and wheezing.    Cardiovascular: Negative for chest pain, palpitations and leg swelling.   Gastrointestinal: Positive for abdominal distention, abdominal pain and constipation. Negative for blood in stool, diarrhea, nausea and vomiting.   Genitourinary: Positive for flank pain. Negative for dysuria.   Musculoskeletal: Positive for back pain.   Skin: Negative for rash and wound.   Neurological: Negative for dizziness and light-headedness.     Objective:     Vital Signs (Most Recent):  Temp: 98.8 °F (37.1 °C) (05/27/19 2015)  Pulse: 76 (05/27/19 2015)  Resp: 18 (05/27/19 2015)  BP: 137/65 (05/27/19 2015)  SpO2: (!) 94 % (05/27/19 2015)  O2 Device (Oxygen Therapy): room air (05/27/19 2015) Vital Signs (24h Range):  Temp:  [98 °F (36.7 °C)-98.8 °F (37.1 °C)] 98.8 °F (37.1 °C)  Pulse:  [64-79] 76  Resp:  [15-18] 18  SpO2:  [89 %-99 %] 94 %  BP: (126-153)/(63-70) 137/65     Weight: 113.4 kg (250 lb) (05/23/19 1903)  Body  mass index is 45.73 kg/m².  Body surface area is 2.23 meters squared.    I/O last 3 completed shifts:  In: 1850 [P.O.:1850]  Out: 2325 [Urine:2325]    Physical Exam   Constitutional: She is oriented to person, place, and time. She appears well-developed and well-nourished. No distress.   HENT:   Head: Normocephalic and atraumatic.   Mouth/Throat: No oropharyngeal exudate.   Eyes: Pupils are equal, round, and reactive to light. Conjunctivae and EOM are normal. No scleral icterus.   Neck: Normal range of motion. Neck supple. No JVD present. No thyromegaly present.   Cardiovascular: Normal rate and regular rhythm.   Pulmonary/Chest: Effort normal and breath sounds normal. No respiratory distress. She has no wheezes. She exhibits no tenderness.   Abdominal: Soft. Bowel sounds are normal. There is tenderness. There is no guarding.   Genitourinary:   Genitourinary Comments: Sheth in place   Musculoskeletal: She exhibits tenderness (flank and back). She exhibits no edema.   Lymphadenopathy:     She has no cervical adenopathy.   Neurological: She is oriented to person, place, and time. No sensory deficit.   BL UE strength 5/5  BL LE strength 4/5   Skin: Skin is warm. No rash noted.   Nursing note and vitals reviewed.      Significant Labs:  CBC:   Recent Labs   Lab 05/27/19  0354   WBC 14.71*   RBC 3.71*   HGB 10.4*   HCT 32.4*   *   MCV 87   MCH 28.0   MCHC 32.1     CMP:   Recent Labs   Lab 05/27/19  0354   *   CALCIUM 9.1   ALBUMIN 2.6*   PROT 7.1   *   K 4.2   CO2 16*      BUN 36*   CREATININE 3.4*   ALKPHOS 89   ALT 13   AST 12   BILITOT 0.3     All labs within the past 24 hours have been reviewed.    Significant Imaging:  CXR personally reviewed.

## 2019-05-28 NOTE — ASSESSMENT & PLAN NOTE
Previous extensive CKD workup indicates that HTN and DM2 etiologies of CKD (JB, cryoglobulins, SPEP/UPEP, ANCA's, and complement all wnl's)  Baseline creatinine 1.6/1.7. Elevation during admission. Peak 3.4.  - Strict I/Os and chart  - MAP > 65  - Hb > 7 g/dL  - Avoid nephrotoxic medications, NSAIDs, IV contrast, etc    Cr improving today; 2.8  Voiding trial  Holding lasix and lisinopril  Continue to monitor RF

## 2019-05-28 NOTE — ASSESSMENT & PLAN NOTE
Febrile intermittently after admission  WCC peaked 15.71, now wnl after BSABx  Treating for potential PNA in setting of acute hypoxic/hypercapnic resp failure  Considerations include inflammatory reaction in setting of fracture as well   CXR prior to RR negative for acute process, UA w/o infection, blood cx pending but low suspicion for +  Abx started following rapid response during which there was concern for hypotension, which was likely due to cuff misplacement  However, due to ongoing fevers w/o source, covered broadly with renally dosed vanc and cefepime  Today narrowing to ceftriaxone to complete 5 total day course for CAP

## 2019-05-29 PROBLEM — Z78.9 IMPAIRED MOBILITY AND ADLS: Status: ACTIVE | Noted: 2019-05-29

## 2019-05-29 PROBLEM — Z74.09 IMPAIRED MOBILITY AND ADLS: Status: ACTIVE | Noted: 2019-05-29

## 2019-05-29 LAB
ANION GAP SERPL CALC-SCNC: 9 MMOL/L (ref 8–16)
BASOPHILS # BLD AUTO: 0.06 K/UL (ref 0–0.2)
BASOPHILS NFR BLD: 0.5 % (ref 0–1.9)
BUN SERPL-MCNC: 41 MG/DL (ref 6–20)
CALCIUM SERPL-MCNC: 9.4 MG/DL (ref 8.7–10.5)
CHLORIDE SERPL-SCNC: 103 MMOL/L (ref 95–110)
CO2 SERPL-SCNC: 25 MMOL/L (ref 23–29)
CREAT SERPL-MCNC: 2.6 MG/DL (ref 0.5–1.4)
DIFFERENTIAL METHOD: ABNORMAL
EOSINOPHIL # BLD AUTO: 0.3 K/UL (ref 0–0.5)
EOSINOPHIL NFR BLD: 2.4 % (ref 0–8)
ERYTHROCYTE [DISTWIDTH] IN BLOOD BY AUTOMATED COUNT: 13.7 % (ref 11.5–14.5)
EST. GFR  (AFRICAN AMERICAN): 22.8 ML/MIN/1.73 M^2
EST. GFR  (NON AFRICAN AMERICAN): 19.7 ML/MIN/1.73 M^2
GLUCOSE SERPL-MCNC: 123 MG/DL (ref 70–110)
HCT VFR BLD AUTO: 33.7 % (ref 37–48.5)
HGB BLD-MCNC: 11.1 G/DL (ref 12–16)
IMM GRANULOCYTES # BLD AUTO: 0.09 K/UL (ref 0–0.04)
IMM GRANULOCYTES NFR BLD AUTO: 0.8 % (ref 0–0.5)
LYMPHOCYTES # BLD AUTO: 1.8 K/UL (ref 1–4.8)
LYMPHOCYTES NFR BLD: 16 % (ref 18–48)
MCH RBC QN AUTO: 27.8 PG (ref 27–31)
MCHC RBC AUTO-ENTMCNC: 32.9 G/DL (ref 32–36)
MCV RBC AUTO: 85 FL (ref 82–98)
MONOCYTES # BLD AUTO: 1.1 K/UL (ref 0.3–1)
MONOCYTES NFR BLD: 9.9 % (ref 4–15)
NEUTROPHILS # BLD AUTO: 7.9 K/UL (ref 1.8–7.7)
NEUTROPHILS NFR BLD: 70.4 % (ref 38–73)
NRBC BLD-RTO: 0 /100 WBC
PLATELET # BLD AUTO: 154 K/UL (ref 150–350)
PMV BLD AUTO: 11.5 FL (ref 9.2–12.9)
POCT GLUCOSE: 127 MG/DL (ref 70–110)
POCT GLUCOSE: 94 MG/DL (ref 70–110)
POTASSIUM SERPL-SCNC: 4.8 MMOL/L (ref 3.5–5.1)
RBC # BLD AUTO: 3.99 M/UL (ref 4–5.4)
SODIUM SERPL-SCNC: 137 MMOL/L (ref 136–145)
WBC # BLD AUTO: 11.17 K/UL (ref 3.9–12.7)

## 2019-05-29 PROCEDURE — 63600175 PHARM REV CODE 636 W HCPCS: Performed by: STUDENT IN AN ORGANIZED HEALTH CARE EDUCATION/TRAINING PROGRAM

## 2019-05-29 PROCEDURE — 85025 COMPLETE CBC W/AUTO DIFF WBC: CPT

## 2019-05-29 PROCEDURE — 94660 CPAP INITIATION&MGMT: CPT

## 2019-05-29 PROCEDURE — 36415 COLL VENOUS BLD VENIPUNCTURE: CPT

## 2019-05-29 PROCEDURE — 99900035 HC TECH TIME PER 15 MIN (STAT)

## 2019-05-29 PROCEDURE — 25000003 PHARM REV CODE 250: Performed by: INTERNAL MEDICINE

## 2019-05-29 PROCEDURE — 99231 PR SUBSEQUENT HOSPITAL CARE,LEVL I: ICD-10-PCS | Mod: GC,,, | Performed by: HOSPITALIST

## 2019-05-29 PROCEDURE — 25000003 PHARM REV CODE 250: Performed by: HOSPITALIST

## 2019-05-29 PROCEDURE — 94761 N-INVAS EAR/PLS OXIMETRY MLT: CPT

## 2019-05-29 PROCEDURE — 99222 PR INITIAL HOSPITAL CARE,LEVL II: ICD-10-PCS | Mod: ,,, | Performed by: NURSE PRACTITIONER

## 2019-05-29 PROCEDURE — 94799 UNLISTED PULMONARY SVC/PX: CPT

## 2019-05-29 PROCEDURE — 99232 SBSQ HOSP IP/OBS MODERATE 35: CPT | Mod: ,,, | Performed by: INTERNAL MEDICINE

## 2019-05-29 PROCEDURE — 97535 SELF CARE MNGMENT TRAINING: CPT

## 2019-05-29 PROCEDURE — 99232 PR SUBSEQUENT HOSPITAL CARE,LEVL II: ICD-10-PCS | Mod: ,,, | Performed by: INTERNAL MEDICINE

## 2019-05-29 PROCEDURE — 11000001 HC ACUTE MED/SURG PRIVATE ROOM

## 2019-05-29 PROCEDURE — 63600175 PHARM REV CODE 636 W HCPCS: Performed by: INTERNAL MEDICINE

## 2019-05-29 PROCEDURE — 99222 1ST HOSP IP/OBS MODERATE 55: CPT | Mod: ,,, | Performed by: NURSE PRACTITIONER

## 2019-05-29 PROCEDURE — 25000003 PHARM REV CODE 250: Performed by: STUDENT IN AN ORGANIZED HEALTH CARE EDUCATION/TRAINING PROGRAM

## 2019-05-29 PROCEDURE — 99231 SBSQ HOSP IP/OBS SF/LOW 25: CPT | Mod: GC,,, | Performed by: HOSPITALIST

## 2019-05-29 PROCEDURE — 27000190 HC CPAP FULL FACE MASK W/VALVE

## 2019-05-29 PROCEDURE — 97530 THERAPEUTIC ACTIVITIES: CPT

## 2019-05-29 PROCEDURE — 80048 BASIC METABOLIC PNL TOTAL CA: CPT

## 2019-05-29 RX ORDER — LISINOPRIL 40 MG/1
20 TABLET ORAL DAILY
Start: 2019-05-29 | End: 2019-05-30 | Stop reason: SDUPTHER

## 2019-05-29 RX ORDER — LACTULOSE 10 G/15ML
200 SOLUTION ORAL; RECTAL 3 TIMES DAILY
Status: DISCONTINUED | OUTPATIENT
Start: 2019-05-29 | End: 2019-05-29

## 2019-05-29 RX ORDER — FUROSEMIDE 40 MG/1
40 TABLET ORAL 2 TIMES DAILY
Start: 2019-05-29 | End: 2019-05-30

## 2019-05-29 RX ORDER — METHOCARBAMOL 500 MG/1
500 TABLET, FILM COATED ORAL 4 TIMES DAILY
Status: DISCONTINUED | OUTPATIENT
Start: 2019-05-29 | End: 2019-05-30 | Stop reason: HOSPADM

## 2019-05-29 RX ORDER — LACTULOSE 10 G/15ML
200 SOLUTION ORAL; RECTAL 3 TIMES DAILY
Status: DISCONTINUED | OUTPATIENT
Start: 2019-05-29 | End: 2019-05-30

## 2019-05-29 RX ORDER — LIDOCAINE 50 MG/G
1 PATCH TOPICAL
Status: DISCONTINUED | OUTPATIENT
Start: 2019-05-29 | End: 2019-05-30 | Stop reason: HOSPADM

## 2019-05-29 RX ORDER — ACETAMINOPHEN 500 MG
1000 TABLET ORAL EVERY 8 HOURS
Status: DISCONTINUED | OUTPATIENT
Start: 2019-05-29 | End: 2019-05-30 | Stop reason: HOSPADM

## 2019-05-29 RX ADMIN — AMLODIPINE BESYLATE 10 MG: 10 TABLET ORAL at 08:05

## 2019-05-29 RX ADMIN — LIDOCAINE 1 PATCH: 50 PATCH TOPICAL at 06:05

## 2019-05-29 RX ADMIN — ATORVASTATIN CALCIUM 10 MG: 10 TABLET, FILM COATED ORAL at 08:05

## 2019-05-29 RX ADMIN — ASPIRIN 81 MG: 81 TABLET, COATED ORAL at 08:05

## 2019-05-29 RX ADMIN — TRAMADOL HYDROCHLORIDE 50 MG: 50 TABLET, FILM COATED ORAL at 08:05

## 2019-05-29 RX ADMIN — PANTOPRAZOLE SODIUM 40 MG: 40 TABLET, DELAYED RELEASE ORAL at 08:05

## 2019-05-29 RX ADMIN — HEPARIN SODIUM 7500 UNITS: 5000 INJECTION, SOLUTION INTRAVENOUS; SUBCUTANEOUS at 02:05

## 2019-05-29 RX ADMIN — ESCITALOPRAM OXALATE 10 MG: 5 TABLET, FILM COATED ORAL at 08:05

## 2019-05-29 RX ADMIN — OXYCODONE HYDROCHLORIDE 5 MG: 5 TABLET ORAL at 12:05

## 2019-05-29 RX ADMIN — SENNOSIDES AND DOCUSATE SODIUM 1 TABLET: 8.6; 5 TABLET ORAL at 09:05

## 2019-05-29 RX ADMIN — CARVEDILOL 25 MG: 25 TABLET, FILM COATED ORAL at 08:05

## 2019-05-29 RX ADMIN — POLYETHYLENE GLYCOL 3350 17 G: 17 POWDER, FOR SOLUTION ORAL at 08:05

## 2019-05-29 RX ADMIN — LEVOTHYROXINE SODIUM 200 MCG: 100 TABLET ORAL at 06:05

## 2019-05-29 RX ADMIN — METHOCARBAMOL TABLETS 500 MG: 500 TABLET, COATED ORAL at 09:05

## 2019-05-29 RX ADMIN — OXYCODONE HYDROCHLORIDE 5 MG: 5 TABLET ORAL at 06:05

## 2019-05-29 RX ADMIN — ACETAMINOPHEN 1000 MG: 500 TABLET ORAL at 02:05

## 2019-05-29 RX ADMIN — VITAMIN D, TAB 1000IU (100/BT) 1000 UNITS: 25 TAB at 08:05

## 2019-05-29 RX ADMIN — LACTULOSE 200 G: 10 SOLUTION ORAL at 06:05

## 2019-05-29 RX ADMIN — CEFTRIAXONE SODIUM 1 G: 1 INJECTION, POWDER, FOR SOLUTION INTRAMUSCULAR; INTRAVENOUS at 12:05

## 2019-05-29 RX ADMIN — HEPARIN SODIUM 7500 UNITS: 5000 INJECTION, SOLUTION INTRAVENOUS; SUBCUTANEOUS at 06:05

## 2019-05-29 RX ADMIN — SENNOSIDES AND DOCUSATE SODIUM 1 TABLET: 8.6; 5 TABLET ORAL at 08:05

## 2019-05-29 RX ADMIN — ACETAMINOPHEN 1000 MG: 500 TABLET ORAL at 09:05

## 2019-05-29 RX ADMIN — CARVEDILOL 25 MG: 25 TABLET, FILM COATED ORAL at 04:05

## 2019-05-29 RX ADMIN — METHOCARBAMOL TABLETS 500 MG: 500 TABLET, COATED ORAL at 04:05

## 2019-05-29 RX ADMIN — HEPARIN SODIUM 7500 UNITS: 5000 INJECTION, SOLUTION INTRAVENOUS; SUBCUTANEOUS at 09:05

## 2019-05-29 NOTE — ASSESSMENT & PLAN NOTE
-  Initially reported BLE weakness/numbess and urinary retention requiring mera placement  -  Imaging showed acute L2 compression fracture with small bilateral epidural hematoma  -  Neurosurgery consulted: no acute surgical intervention--> recommended TLSO brace with OOB >5 minutes and PRN for comfort and clinic follow-up in 6 weeks

## 2019-05-29 NOTE — PROGRESS NOTES
"[facility] Hospital Medicine  Progress Note    Patient Name: Kathie Quezada  MRN: [id number]  Patient Class: IP- Inpatient   Admission Date: 5/23/2019  Length of Stay: 5 days  Attending Physician: Tracy Barnes MD  Primary Care Provider: Elan Price MD    [facility] Medicine Team: [facility] HOSP MED 4 Mita Ceron MD    Subjective:     Principal Problem:ARUNA (acute kidney injury)    HPI:  Kathie Quezada is a 58 y.o. woman with a history of HFpEF (EF: 60% 1/19), CKD Stage G3a/A3 c/b Nephrotic Range Proteinuria, DM2 (A1c: 5.2%) not taking Insulin at home, HTN, HLD, Hypothyroidism (s/p Thyroidectomy), GERD, Hx of CVA (no residual deficits) and Hx HCV s/p Harvoni (2015) with reported undetectable VL, and chronic back pain 2/2 T12 fracture who presents to [facility] ED via EMS after a fall at home. Pt reports picking up her grand-daughter, becoming unbalanced, and falling on her back. She denies any dizziness, lightheadedness, or other pre-syncopal symptoms prior to the fall. She also denies any head trauma. However, she notes a "crack" upon her impact with the ground in addition to the onset of pain. She also had "no feeling in my legs" and experienced a loss of sensation until EMS arrived at the scene. Pt denies any recent exacerbations related to her chronic co-morbidities. Her baseline back pain is about a 5/10 and she takes no medication for it; her T12 fracture occurred approx 3 years ago. She denies any recent exacerbations in terms of SOB, leg swelling, and BP control. She further denies any F/C, N/V, diarrhea, constipation, dysuria, CP, palpitations.     While in the ED, Pt was reported to complain of urinary retention. A bladder scan revealed >1L and Pt had UOP of 1200cc w/ insertion of a urinary catheter. Pt is uncertain if it was her pain that limited her ability to urinate. She has not had issues with urinary retention, incontinence, decreased stream, straining to urinate " previously.     Hospital Course:  60yo woman with HFpEF, CKD3, DM2 not taking insulin at home with peripheral neuropathy, HTN, HLD, hypothyroidism (s/p thyroidectomy), GERD, hx of CVA (no residual deficits), hx of HCV s/p Harvoni (2015) with reported undetectable VL, and chronic back pain 2/2 T12 fracture who presents following a mechanical fall at home, found to have acute L2 compression fracture with small bilateral epidural hematoma. Initially with bilateral lower extremity weakness and concern for urinary retention s/p mera placement in ED. MRI and CT imaging obtained; per NSGY low suspicion for compressive polyradiculopathy. TLSO brace in place when OOB.     Admitted to medicine for pain control, PT/OT evaluation. On 5/26, developed acute hypoxic/hypercapnic respiratory failure, likely 2/2 accidental opioid overdose with undiagnosed SERA and restriction from brace. Improved with narcan and BiPAP. Also found to have fevers to 103. Unclear precipitant of fevers, potentially PNA. Initiated on BSAbx and re-cultured; de-escalating to ceftriaxone for 5 day course. Does have ARUNA on CKD3 which has worsened over past few days, now peaking. Possibly 2/2 retention vs drug induced vs ischemic (did have recorded low BP during rapid response on 5/26, although this may have been 2/2 poor cuff fit).    Ultimately, she will need 6 week NSGY f/u with repeat plain films to confirm healing and continue TLSO brace. PT/OT recommend SNF.    Interval History: NAEO. Pt w/ UOP upon voiding trial s/p mera removal. Used BIPAP overnight. Back pain remains; better controlled. Has yet to have BM.     Review of Systems   Constitutional: Negative for appetite change, chills, diaphoresis and fever.   HENT: Negative for congestion, rhinorrhea, sneezing and sore throat.    Eyes: Negative for visual disturbance.   Respiratory: Negative for cough, shortness of breath and wheezing.    Cardiovascular: Negative for chest pain, palpitations and leg  swelling.   Gastrointestinal: Positive for abdominal distention and constipation. Negative for abdominal pain, blood in stool, diarrhea, nausea and vomiting.   Genitourinary: Positive for flank pain. Negative for dysuria.   Musculoskeletal: Positive for back pain.   Skin: Negative for rash and wound.   Neurological: Negative for dizziness and light-headedness.     Objective:     Vital Signs (Most Recent):  Temp: 98 °F (36.7 °C) (05/29/19 1558)  Pulse: 67 (05/29/19 1558)  Resp: 18 (05/29/19 1558)  BP: 134/65 (05/29/19 1558)  SpO2: (!) 93 % (05/29/19 1558) Vital Signs (24h Range):  Temp:  [96.8 °F (36 °C)-98.3 °F (36.8 °C)] 98 °F (36.7 °C)  Pulse:  [62-71] 67  Resp:  [17-19] 18  SpO2:  [92 %-100 %] 93 %  BP: (125-152)/(62-74) 134/65     Weight: 113.4 kg (250 lb)  Body mass index is 45.73 kg/m².    Intake/Output Summary (Last 24 hours) at 5/29/2019 1650  Last data filed at 5/29/2019 1400  Gross per 24 hour   Intake 3650 ml   Output 500 ml   Net 3150 ml      Physical Exam   Constitutional: She is oriented to person, place, and time. No distress.   HENT:   Head: Normocephalic and atraumatic.   Right Ear: External ear normal.   Left Ear: External ear normal.   Mouth/Throat: Oropharynx is clear and moist. No oropharyngeal exudate.   Eyes: Pupils are equal, round, and reactive to light. Conjunctivae and EOM are normal. No scleral icterus.   Neck: Normal range of motion. Neck supple. No JVD present. No thyromegaly present.   Cardiovascular: Normal rate, regular rhythm, normal heart sounds and intact distal pulses. Exam reveals no gallop and no friction rub.   No murmur heard.  Pulmonary/Chest: Effort normal and breath sounds normal. No respiratory distress. She has no wheezes. She exhibits no tenderness.   Abdominal: Soft. Bowel sounds are normal. There is tenderness. There is no guarding.   Musculoskeletal: She exhibits tenderness (flank and back). She exhibits no edema.   Lymphadenopathy:     She has no cervical  "adenopathy.   Neurological: She is oriented to person, place, and time. No sensory deficit.   BL UE strength 5/5  BL LE strength 4/5   Skin: Skin is warm. No rash noted.   Nursing note and vitals reviewed.      Significant Labs: All pertinent labs within the past 24 hours have been reviewed.    Significant Imaging: I have reviewed all pertinent imaging results/findings within the past 24 hours.    Assessment/Plan:      * ARUNA (acute kidney injury)  See CKD      Closed compression fracture of L2 lumbar vertebra  60 yo female s/p mechanical fall with superior L2 compression fracture.   Small bilateral EDH on MRI adjacent to pedicles at L2 with mild thecal sac compression.  L-spine Ct w/ stable superior endplate compression fracture  Per NSGY,    No acute neurosurgical intervention   TLSO brace when out of bed more than 5 minutes or prn for comfort while in bed   Will arrange for pt to follow up in six weeks in outpatient clinic with interval plain films to confirm healing fracture   Cleared for discharge per neurosurgical perspective upon completion of baseline plain films  Previous DEXA w/ osteopenia; initiating Ca and Vit supplements    Due to persistent pain, on Oxy-IR 5 mg Q6 hrs for severe pain, tramadol PRN for moderate.   Scheduled tylenol and methocarbamol  Lidocaine patch  PT/OT recommending SNF       Acute urinary retention  60 yo female s/p mechanical fall with superior L2 compression fracture.  After presentation to ED, c/o of urinary retnetion w/ bladder scan significant for >1L retained urine and UOP w/ mera approx 1200cc.  Pt denies previous issue with urinary of bowel incontinence or retention.   Potential etiologies of acute retention include spinal compression, pain, pain medication received upon admission, UTI.   Per NSGY, "degree of canal compression not typical for development of acute severe polyradiculopathy."    UTI negative for infection.   Renal U/S w/o acute process  Oxy is available for " pain control but try to use non-narcotics  Neuro-checks q4 hours  Voiding trial 5/28 w/ successful UOP    Resolved    Impaired mobility and ADLs  PT/OT  TLSO brace with OOB >5 minutes  Encourage mobility, OOB in chair, and early ambulation as appropriate      PNA (pneumonia)  See Fever      Slow transit constipation  Scheduled miralax and senna-docusate w/o BM  Lactulose enema today      Acute respiratory failure with hypoxia and hypercapnia  RR called on Pt 5/26 in setting of AMS w/ increased lethargy and concern for airway protection.   Pt with hx of brain bleed, ESRD, fevers. Pt received 30mg oxycodone in 9 hours. GCS initially 4, improved to 14 after narcan administration and jaw thrust. Pt requiring repeat PRN doses of narcan.      -etiology likely combo of C02 retention in setting of brace and pain inhibiting full respirations and pain medication administration in setting renal injury    -contact MICU if not requiring more doses of narcan, will consider narcan drip if needed.   -low threshold to admit to MICU if somnolence and narcan necessity continues.  -d/c narcotics   AMS resolved s/p narcan during Rapid Response and BiPAPqhs, continue to monitor closely and continue BiPAP with backup rate to ensure pt does not become apneic. Goal sats 88-92%.     Altered mental status  RR called on Pt 5/26 in setting of AMS w/ increased lethargy and concern for airway protection.   Pt with hx of brain bleed, ESRD, fevers. Pt received 30mg oxycodone in 9 hours. GCS initially 4, improved to 14 after narcan administration and jaw thrust. Pt requiring repeat PRN doses of narcan.      -etiology likely combo of C02 retention in setting of brace and pain inhibiting full respirations and pain medication administration in setting renal injury    -contact MICU if not requiring more doses of narcan, will consider narcan drip if needed.   -low threshold to admit to MICU if somnolence and narcan necessity continues.  -d/c narcotics    AMS resolved s/p narcan following Rapid Response and BiPAP qhs, continue to monitor closely      Fever  Febrile intermittently after admission  WCC peaked 15.71, now wnl after BSABx  Treating for potential PNA in setting of acute hypoxic/hypercapnic resp failure  Considerations include inflammatory reaction in setting of fracture as well   CXR prior to RR negative for acute process, UA w/o infection, blood cx pending but low suspicion for +  Abx started following rapid response during which there was concern for hypotension, which was likely due to cuff misplacement  However, due to ongoing fevers w/o source, covered broadly with renally dosed vanc and cefepime  Narrowed to ceftriaxone to complete 5 total day course for CAP; end date 5/30    Hypokalemia  Admit K 3.1  Likely due to diuretic use  Replete and monitor      GERD (gastroesophageal reflux disease)  - Protonix 40 mg daily      Hypothyroidism  - Synthroid 200 mcg daily  - Recent TSH/FT4 wnl's    Factor XI deficiency  Initially holding lovenox and SCDs for DVT ppx  However, in setting of ongoing fevers and respiratory decompensation 5/27 concern for embolus  BL LE U/S negative  D-dimer 1.24, unable to pursue CTA and V/Q scan at this time due to ARUNA and Pt status respectively   Stated heparin 7500U q8 hours      Chronic diastolic heart failure  Pt euvolemic on exam.   No c/o of SOB, CP, palpitations.   Holding home lasix 40mg BID in setting of potential ARUNA.       CKD stage G3b/A3, GFR 30-44 and albumin creatinine ratio >300 mg/g  Previous extensive CKD workup indicates that HTN and DM2 etiologies of CKD (JB, cryoglobulins, SPEP/UPEP, ANCA's, and complement all wnl's)  Baseline creatinine 1.6/1.7. Elevation during admission. Peak 3.4.  - Strict I/Os and chart  - MAP > 65  - Hb > 7 g/dL  - Avoid nephrotoxic medications, NSAIDs, IV contrast, etc    Cr continues to improve; 2.0->3.4->2.8->2.6  Voiding trial successful; producing urine  Urine sediment by MD  5/27/2019: some muddy brown casts, abundant WBCs, abundant hyaline casts-> suggest ATN  Holding lasix and lisinopril  Continue to monitor RF    Hyperlipidemia  Continue home statin  Previous CVA hx, no residual side effects      HTN (hypertension)  On arrival Pt w/ BP 170s/80s. Per Pt, baseline on home medication closer to SBPs 130s-140s.  Home medications include: amlodipine 10mg qd, coreg 25mg bid, lisinopril 20mg qd. Also hydralazine 25mg for SBP>160.  Present values likely due to acute fracture pain. Will optimize pain regimen.   Continue home medications; titrate as necessary.        Type 2 diabetes mellitus with stage 3 chronic kidney disease, without long-term current use of insulin  -low dose SSI, not taking Insulin at home and most recent A1c at goal  -diabetic diet      Postoperative hypothyroidism  Continue home synthroid        VTE Risk Mitigation (From admission, onward)        Ordered     heparin (porcine) injection 7,500 Units  Every 8 hours      05/26/19 1338     IP VTE HIGH RISK PATIENT  Once      05/25/19 0553     Place EMILIA hose  Until discontinued      05/25/19 0553     Place sequential compression device  Until discontinued      05/24/19 9244              Mita Ceron MD   IM, PGY-1  Department of Hospital Medicine   [facility]

## 2019-05-29 NOTE — ASSESSMENT & PLAN NOTE
-  (I) with ADLs and mobility at baseline  -  Pain limited  Recommendations  -  Encourage mobility, OOB in chair, and early ambulation as appropriate  -  PT/OT evaluate and treat  -  DVT prophylaxis  -  Monitor for and prevent skin breakdown and pressure ulcers  -  Monitor Bowel and bladder  · Consider treating constipation with Lactulose 30 ml now and again before lunch, if no results, add suppository following meal  -  Pain management  · Consider scheduling Robaxin 500 mg and Tylenol 650 mg QID for better pain control.  May increase Robaxin to 750-1000 mg as tolerated  · Consider starting Gabapentin 300 mg QHS  · Consider Lidocaine patch to affected area   · Consider IR consult to evaluate candidacy for vertebroplasty vs kyphoplasty

## 2019-05-29 NOTE — CONSULTS
Inpatient consult to Physical Medicine Rehab  Consult performed by: WHITNEY Mitchell  Consult ordered by: Tracy Barnes MD  Reason for consult: rehab evaluation      Consult received.  Reviewed patient history and current admission.  Rehab team following.  Full consult to follow.    SARA Liu, JOSHP-C  Physical Medicine & Rehabilitation   05/29/2019  Spectralink: [id number]

## 2019-05-29 NOTE — CONSULTS
[facility]  Physical Medicine & Rehab  Consult Note    Patient Name: Kathie Quezada  MRN: [id number]  Admission Date: 5/23/2019  Hospital Length of Stay: 5 days  Attending Physician: Tracy Barnes MD     Inpatient consult to Physical Medicine & Rehabilitation  Consult performed by: Carol Winter NP  Consult requested by:  Tracy Barnes MD    Collaborating Physician: Pat Mas MD  Reason for Consult:  Rehab evaluation     Consults  Subjective:     Principal Problem: ARUNA (acute kidney injury)    HPI: Kathie Quezada is a 59-year-old female with PMHx of HTN, HLD, DMII with neuropathy, CHF, CKD III, CVA, factor XI deficiency, hepatitis C s/p successful treatment with Harvoni, and chronic back pain with h/o T12 fracture.  Patient presented to [facility] on 5/23/19 with acute back pain after mechanical fall.  Denied head trauma or LOC.  Initially reported BLE weakness/numbess and urinary retention requiring mera placement.  On arrival, found to have acute L2 compression fracture with small bilateral epidural hematoma.  Evaluated by Neurosurgery.  Low suspicion for compressive polyradiculopathy and no acute surgical intervention indicated.  Recommended TLSO brace with OOB >5 minutes and PRN for comfort and clinic follow-up in 6 weeks.  Hospital course complicated by acute hypoxic and hypercapnic respiratory failure likely 2/2 opioids with restriction from TLSO and AMS requiring rapid response on 5/26/19 (improved with Narcan and BiPAP), ARUNA on CKD (Nephrology following), fevers of unknown etiology (treating with CTX for possible PNA), slow transit constipation, and impaired mobility and ADLs.    Functional History: Patient lives in [place], alone, in a single story home with one step to enter.  Prior to admission, she was (I) with ADLs and mobility.  DME: SW, shower chair.    Hospital Course:   5/25/19:  Evaluated by OT.  Bed mobility totalA.  No functional mobility 2/2 pain.     5/27/19:  Evaluated by PT.  Bed mobility max-totalA.  EOB x ~8 minutes max-totalA.  No further functional mobility or transfers 2/2 pain.  Refused OT 2/2 pain and just working with PT.   5/28/19:  Participated with OT.  Bed mobility maxA.  UBD maxA.     Past Medical History:   Diagnosis Date    CHF (congestive heart failure)     CKD stage G3a/A3, GFR 45-59 and albumin creatinine ratio >300 mg/g 5/16/2018    CVA (cerebral vascular accident) 2017    Essential hypertension 4/9/2018    Factor XI deficiency 1978    GIB (gastrointestinal bleeding) 10/18/2018    History of hepatitis C     s/p succesful Rx w/ Harvoni by Dr Herbert  - SVR12 (cure) 2016    Hyperlipidemia     Mixed hyperlipidemia 4/9/2018    Postoperative hypothyroidism     Proteinuria 4/9/2018    Type 2 diabetes mellitus with circulatory disorder, with long-term current use of insulin 4/9/2018     Past Surgical History:   Procedure Laterality Date    BACK SURGERY      COLONOSCOPY  11/13/2015    Dr Chino Herbert - brian prep. internal hemorrhoids. diverticulosis of sigmois. NO polyps. repeat due 2025    galbladder      HYSTERECTOMY      OOPHORECTOMY      one    TONSILLECTOMY       Review of patient's allergies indicates:   Allergen Reactions    Bactrim [sulfamethoxazole-trimethoprim] Hives    Codeine Hives    Penicillins Hives    Shellfish containing products Itching    Sulfa (sulfonamide antibiotics) Hives       Scheduled Medications:    amLODIPine  10 mg Oral Daily    aspirin  81 mg Oral Daily    atorvastatin  10 mg Oral Daily    carvedilol  25 mg Oral BID WM    cefTRIAXone (ROCEPHIN) IVPB  1 g Intravenous Q24H    escitalopram oxalate  10 mg Oral Daily    heparin (porcine)  7,500 Units Subcutaneous Q8H    levothyroxine  200 mcg Oral Before breakfast    pantoprazole  40 mg Oral Daily    polyethylene glycol  17 g Oral BID    senna-docusate 8.6-50 mg  1 tablet Oral BID    vitamin D  1,000 Units Oral Daily       PRN Medications:  acetaminophen, dextrose 50%, dextrose 50%, diphenhydrAMINE, glucagon (human recombinant), glucose, glucose, insulin aspart U-100, naloxone, ondansetron, oxyCODONE, senna-docusate 8.6-50 mg, sodium chloride 0.9%, sodium chloride 0.9%, traMADol    Family History     Problem Relation (Age of Onset)    Diabetes type II Mother        Tobacco Use    Smoking status: Never Smoker    Smokeless tobacco: Never Used   Substance and Sexual Activity    Alcohol use: Yes     Alcohol/week: 2.4 oz     Types: 4 Cans of beer per week     Comment: weekly    Drug use: No    Sexual activity: Not on file     Review of Systems   Constitutional: Positive for activity change. Negative for chills and fever.   HENT: Negative for drooling, hearing loss, trouble swallowing and voice change.    Eyes: Negative for pain and visual disturbance.   Respiratory: Negative for cough, shortness of breath and wheezing.    Cardiovascular: Negative for chest pain and palpitations.   Gastrointestinal: Positive for constipation. Negative for abdominal distention, nausea and vomiting.   Genitourinary: Positive for difficulty urinating. Negative for flank pain.   Musculoskeletal: Positive for arthralgias, back pain and gait problem. Negative for neck pain.   Skin: Negative for rash and wound.   Neurological: Positive for weakness and numbness. Negative for dizziness and headaches.   Psychiatric/Behavioral: Negative for agitation and hallucinations. The patient is not nervous/anxious.      Objective:     Vital Signs (Most Recent):  Temp: 97.2 °F (36.2 °C) (05/29/19 0851)  Pulse: 70 (05/29/19 0851)  Resp: 17 (05/29/19 0851)  BP: (!) 152/69 (05/29/19 0851)  SpO2: 96 % (05/29/19 0851)    Vital Signs (24h Range):  Temp:  [96.8 °F (36 °C)-98.1 °F (36.7 °C)] 97.2 °F (36.2 °C)  Pulse:  [61-71] 70  Resp:  [17-19] 17  SpO2:  [90 %-100 %] 96 %  BP: (136-152)/(63-74) 152/69     Body mass index is 45.73 kg/m².    Physical Exam   Constitutional: She is oriented to person,  place, and time. She appears well-developed and well-nourished. No distress.   Obese   HENT:   Head: Normocephalic and atraumatic.   Right Ear: External ear normal.   Left Ear: External ear normal.   Nose: Nose normal.   Eyes: Right eye exhibits no discharge. Left eye exhibits no discharge. No scleral icterus.   Neck: Normal range of motion.   Cardiovascular: Normal rate and intact distal pulses.   Pulmonary/Chest: Effort normal. No respiratory distress. She has no wheezes.   Abdominal: Soft. She exhibits no distension. There is no tenderness.   Musculoskeletal: She exhibits tenderness. She exhibits no edema.        Lumbar back: She exhibits decreased range of motion.   Neurological: She is alert and oriented to person, place, and time. She has normal strength. She exhibits normal muscle tone.   Skin: Skin is warm and dry. No rash noted.   Psychiatric: She has a normal mood and affect. Her behavior is normal.   Vitals reviewed.    Diagnostic Results:   Labs: Reviewed  X-Ray: Reviewed  US: Reviewed  CT: Reviewed  MRI: Reviewed    Assessment/Plan:     * ARUNA (acute kidney injury)  -  Nephrology following    PNA (pneumonia)  -  See fever    Acute respiratory failure with hypoxia and hypercapnia  -  Rapid response on 5/26/19--> improved with Narcan and BiPAP  -  Management per primary team    Altered mental status  -  Rapid response on 5/26/19--> improved with Narcan and BiPAP  -  Management per primary team    Fever  -  Treating with Ceftriaxone x 5 days for presumed CAP  -  Management per primary team    Closed compression fracture of L2 lumbar vertebra  -  Initially reported BLE weakness/numbess and urinary retention requiring mera placement  -  Imaging showed acute L2 compression fracture with small bilateral epidural hematoma  -  Neurosurgery consulted: no acute surgical intervention--> recommended TLSO brace with OOB >5 minutes and PRN for comfort and clinic follow-up in 6 weeks    Impaired mobility and ADLs  -   (I) with ADLs and mobility at baseline  -  Pain limited  Recommendations  -  Encourage mobility, OOB in chair, and early ambulation as appropriate  -  PT/OT evaluate and treat  -  DVT prophylaxis  -  Monitor for and prevent skin breakdown and pressure ulcers  -  Monitor Bowel and bladder  · Consider treating constipation with Lactulose 30 ml now and again before lunch, if no results, add suppository following meal  -  Pain management  · Consider scheduling Robaxin 500 mg and Tylenol 650 mg QID for better pain control.  May increase Robaxin to 750-1000 mg as tolerated  · Consider starting Gabapentin 300 mg QHS  · Consider Lidocaine patch to affected area   · Consider IR consult to evaluate candidacy for vertebroplasty vs kyphoplasty    Participating with therapy, pain and effort limited.  Consider above recommendations for better pain control.  Continue therapy.  Will follow therapy progress for final post-acute recommendation.    Thank you for your consult.     WHITNEY Keith  Department of Physical Medicine & Rehab  [facility]

## 2019-05-29 NOTE — HPI
Kathie Holguin is a 59-year-old female with PMHx of HTN, HLD, DMII with neuropathy, CHF, CKD III, CVA, factor XI deficiency, hepatitis C s/p successful treatment with Harvoni, and chronic back pain with h/o T12 fracture.  Patient [place] to [facility] [place]23[place] with acute back pain after mechanical fall.  Denied head trauma or LOC.  Initially reported BLE weakness/numbess and urinary retention requiring mera placement.  On arrival, found to have acute L2 compression fracture with small bilateral epidural hematoma.  Evaluated by Neurosurgery.  Low suspicion for compressive polyradiculopathy and no acute surgical intervention indicated.  Recommended TLSO brace with OOB >5 minutes and PRN for comfort and clinic follow-up in 6 weeks.  Hospital course complicated by acute hypoxic and hypercapnic respiratory failure likely 2/2 opioids with restriction from TLSO and AMS requiring rapid response on 5/26/19 (improved with Narcan and BiPAP), ARUNA on CKD (Nephrology following), fevers of unknown etiology (treating with CTX for possible PNA), slow transit constipation, and impaired mobility and ADLs.    Functional History: Patient lives in [place], alone, in a single story home with one step to enter.  Prior to admission, she was (I) with ADLs and mobility.  DME: [facility], shower chair.

## 2019-05-29 NOTE — SUBJECTIVE & OBJECTIVE
Interval History: NAEO. Pt w/ UOP upon voiding trial s/p mera removal. Used BIPAP overnight. Back pain remains; better controlled. Has yet to have BM.     Review of Systems   Constitutional: Negative for appetite change, chills, diaphoresis and fever.   HENT: Negative for congestion, rhinorrhea, sneezing and sore throat.    Eyes: Negative for visual disturbance.   Respiratory: Negative for cough, shortness of breath and wheezing.    Cardiovascular: Negative for chest pain, palpitations and leg swelling.   Gastrointestinal: Positive for abdominal distention and constipation. Negative for abdominal pain, blood in stool, diarrhea, nausea and vomiting.   Genitourinary: Positive for flank pain. Negative for dysuria.   Musculoskeletal: Positive for back pain.   Skin: Negative for rash and wound.   Neurological: Negative for dizziness and light-headedness.     Objective:     Vital Signs (Most Recent):  Temp: 98 °F (36.7 °C) (05/29/19 1558)  Pulse: 67 (05/29/19 1558)  Resp: 18 (05/29/19 1558)  BP: 134/65 (05/29/19 1558)  SpO2: (!) 93 % (05/29/19 1558) Vital Signs (24h Range):  Temp:  [96.8 °F (36 °C)-98.3 °F (36.8 °C)] 98 °F (36.7 °C)  Pulse:  [62-71] 67  Resp:  [17-19] 18  SpO2:  [92 %-100 %] 93 %  BP: (125-152)/(62-74) 134/65     Weight: 113.4 kg (250 lb)  Body mass index is 45.73 kg/m².    Intake/Output Summary (Last 24 hours) at 5/29/2019 1650  Last data filed at 5/29/2019 1400  Gross per 24 hour   Intake 3650 ml   Output 500 ml   Net 3150 ml      Physical Exam   Constitutional: She is oriented to person, place, and time. No distress.   HENT:   Head: Normocephalic and atraumatic.   Right Ear: External ear normal.   Left Ear: External ear normal.   Mouth/Throat: Oropharynx is clear and moist. No oropharyngeal exudate.   Eyes: Pupils are equal, round, and reactive to light. Conjunctivae and EOM are normal. No scleral icterus.   Neck: Normal range of motion. Neck supple. No JVD present. No thyromegaly present.    Cardiovascular: Normal rate, regular rhythm, normal heart sounds and intact distal pulses. Exam reveals no gallop and no friction rub.   No murmur heard.  Pulmonary/Chest: Effort normal and breath sounds normal. No respiratory distress. She has no wheezes. She exhibits no tenderness.   Abdominal: Soft. Bowel sounds are normal. There is tenderness. There is no guarding.   Musculoskeletal: She exhibits tenderness (flank and back). She exhibits no edema.   Lymphadenopathy:     She has no cervical adenopathy.   Neurological: She is oriented to person, place, and time. No sensory deficit.   BL UE strength 5/5  BL LE strength 4/5   Skin: Skin is warm. No rash noted.   Nursing note and vitals reviewed.      Significant Labs: All pertinent labs within the past 24 hours have been reviewed.    Significant Imaging: I have reviewed all pertinent imaging results/findings within the past 24 hours.

## 2019-05-29 NOTE — PT/OT/SLP PROGRESS
Physical Therapy Treatment    Patient Name:  Kathie Quezada   MRN:  [id number]    Recommendations:     Discharge Recommendations:  nursing facility, skilled   Discharge Equipment Recommendations: commode   Barriers to discharge: Decreased caregiver support and requires increased assistance for functional mobility     Assessment:     Kathie Quezada is a 59 y.o. female admitted with a medical diagnosis of ARUNA (acute kidney injury).  She presents with the following impairments/functional limitations:  weakness, impaired endurance, impaired functional mobilty, gait instability, impaired balance, decreased lower extremity function, decreased ROM, pain, impaired skin, orthopedic precautions. Patient with increased participation in PT session today due to decreased back pain. She was mod A to stand and take 6 steps from bed to bedside chair with RW. She is motivated to participate with PT in order to get back to PLOF.     Rehab Prognosis: Good; patient would benefit from acute skilled PT services to address these deficits and reach maximum level of function.    Recent Surgery: * No surgery found *      Plan:     During this hospitalization, patient to be seen 5 x/week to address the identified rehab impairments via gait training, therapeutic activities, therapeutic exercises and progress toward the following goals:    · Plan of Care Expires:  06/27/19    Subjective     Chief Complaint: Back pain.   Patient/Family Comments/goals: To get back to PLOF.   Pain/Comfort:  · Pain Rating 1: 4/10  · Location 1: back  · Pain Addressed 1: Pre-medicate for activity, Reposition, Nurse notified, Cessation of Activity  · Pain Rating Post-Intervention 1: 8/10      Objective:     Communicated with RN prior to session.  Patient found supine with telemetry, pulse ox (continuous), PureWick, TLSO upon PT entry to room.     General Precautions: Standard, fall   Orthopedic Precautions:spinal precautions   Braces: TLSO      Functional Mobility:  · Bed Mobility:     · Rolling Left:  minimum assistance  · Rolling Right: minimum assistance  · Scooting: minimum assistance  · Supine to Sit: maximal assistance  · Transfers:     · Sit to Stand:  moderate assistance with rolling walker  · Gait: Patient ambulated 6 steps from bed to bedside chair with moderate assistance and rolling walker. She was limited in ambulation distance due to back pain.     AM-PAC 6 CLICK MOBILITY  Turning over in bed (including adjusting bedclothes, sheets and blankets)?: 2  Sitting down on and standing up from a chair with arms (e.g., wheelchair, bedside commode, etc.): 2  Moving from lying on back to sitting on the side of the bed?: 2  Moving to and from a bed to a chair (including a wheelchair)?: 2  Need to walk in hospital room?: 2  Climbing 3-5 steps with a railing?: 1  Basic Mobility Total Score: 11     Therapeutic Activities and Exercises:  Patient performed B LE therex x5 reps for LAQ and x10 reps for A/P.    Patient educated in:  -PT role and POC  -OOB activity to maximize recovery  -safety with transfers including hand placement  -gait sequence and RW management  -TLSO and spinal precautions    Patient left up in chair with all lines intact, call button in reach and RN notified.    GOALS:   Multidisciplinary Problems     Physical Therapy Goals        Problem: Physical Therapy Goal    Goal Priority Disciplines Outcome Goal Variances Interventions   Physical Therapy Goal     PT, PT/OT Ongoing (interventions implemented as appropriate)     Description:  Goals to be met by: 19    Patient will increase functional independence with mobility by performin. Supine to sit with Stand-by Assistance  2. Rolling to Left and Right with Stand-by Assistance using log rolling technique   3. Sit to stand transfer with Stand-by Assistance  4. Bed to chair transfer with Stand-by Assistance using Rolling Walker  5. Gait x25 feet with Stand-by Assistance using  Rolling Walker  6. Sitting at edge of bed x20 minutes with Stand-by Assistance                      Time Tracking:     PT Received On: 05/29/19  PT Start Time: 1124     PT Stop Time: 1150  PT Total Time (min): 26 min     Billable Minutes: Therapeutic Activity 26    Treatment Type: Treatment  PT/PTA: PT       Chetna Nance, PT  05/29/2019

## 2019-05-29 NOTE — SUBJECTIVE & OBJECTIVE
Past Medical History:   Diagnosis Date    CHF (congestive heart failure)     CKD stage G3a/A3, GFR 45-59 and albumin creatinine ratio >300 mg/g 5/16/2018    CVA (cerebral vascular accident) 2017    Essential hypertension 4/9/2018    Factor XI deficiency 1978    GIB (gastrointestinal bleeding) 10/18/2018    History of hepatitis C     s/p succesful Rx w/ Harvoni by Dr Herbert  - SVR12 (cure) 2016    Hyperlipidemia     Mixed hyperlipidemia 4/9/2018    Postoperative hypothyroidism     Proteinuria 4/9/2018    Type 2 diabetes mellitus with circulatory disorder, with long-term current use of insulin 4/9/2018     Past Surgical History:   Procedure Laterality Date    BACK SURGERY      COLONOSCOPY  11/13/2015    Dr Chino torres prep. internal hemorrhoids. diverticulosis of sigmois. NO polyps. repeat due 2025    galbladder      HYSTERECTOMY      OOPHORECTOMY      one    TONSILLECTOMY       Review of patient's allergies indicates:   Allergen Reactions    Bactrim [sulfamethoxazole-trimethoprim] Hives    Codeine Hives    Penicillins Hives    Shellfish containing products Itching    Sulfa (sulfonamide antibiotics) Hives       Scheduled Medications:    amLODIPine  10 mg Oral Daily    aspirin  81 mg Oral Daily    atorvastatin  10 mg Oral Daily    carvedilol  25 mg Oral BID WM    cefTRIAXone (ROCEPHIN) IVPB  1 g Intravenous Q24H    escitalopram oxalate  10 mg Oral Daily    heparin (porcine)  7,500 Units Subcutaneous Q8H    levothyroxine  200 mcg Oral Before breakfast    pantoprazole  40 mg Oral Daily    polyethylene glycol  17 g Oral BID    senna-docusate 8.6-50 mg  1 tablet Oral BID    vitamin D  1,000 Units Oral Daily       PRN Medications: acetaminophen, dextrose 50%, dextrose 50%, diphenhydrAMINE, glucagon (human recombinant), glucose, glucose, insulin aspart U-100, naloxone, ondansetron, oxyCODONE, senna-docusate 8.6-50 mg, sodium chloride 0.9%, sodium chloride 0.9%, traMADol    Family  History     Problem Relation (Age of Onset)    Diabetes type II Mother        Tobacco Use    Smoking status: Never Smoker    Smokeless tobacco: Never Used   Substance and Sexual Activity    Alcohol use: Yes     Alcohol/week: 2.4 oz     Types: 4 Cans of beer per week     Comment: weekly    Drug use: No    Sexual activity: Not on file     Review of Systems   Constitutional: Positive for activity change. Negative for chills and fever.   HENT: Negative for drooling, hearing loss, trouble swallowing and voice change.    Eyes: Negative for pain and visual disturbance.   Respiratory: Negative for cough, shortness of breath and wheezing.    Cardiovascular: Negative for chest pain and palpitations.   Gastrointestinal: Positive for constipation. Negative for abdominal distention, nausea and vomiting.   Genitourinary: Positive for difficulty urinating. Negative for flank pain.   Musculoskeletal: Positive for arthralgias, back pain and gait problem. Negative for neck pain.   Skin: Negative for rash and wound.   Neurological: Positive for weakness and numbness. Negative for dizziness and headaches.   Psychiatric/Behavioral: Negative for agitation and hallucinations. The patient is not nervous/anxious.      Objective:     Vital Signs (Most Recent):  Temp: 97.2 °F (36.2 °C) (05/29/19 0851)  Pulse: 70 (05/29/19 0851)  Resp: 17 (05/29/19 0851)  BP: (!) 152/69 (05/29/19 0851)  SpO2: 96 % (05/29/19 0851)    Vital Signs (24h Range):  Temp:  [96.8 °F (36 °C)-98.1 °F (36.7 °C)] 97.2 °F (36.2 °C)  Pulse:  [61-71] 70  Resp:  [17-19] 17  SpO2:  [90 %-100 %] 96 %  BP: (136-152)/(63-74) 152/69     Body mass index is 45.73 kg/m².    Physical Exam   Constitutional: She is oriented to person, place, and time. She appears well-developed and well-nourished. No distress.   Obese   HENT:   Head: Normocephalic and atraumatic.   Right Ear: External ear normal.   Left Ear: External ear normal.   Nose: Nose normal.   Eyes: Right eye exhibits no  discharge. Left eye exhibits no discharge. No scleral icterus.   Neck: Normal range of motion.   Cardiovascular: Normal rate and intact distal pulses.   Pulmonary/Chest: Effort normal. No respiratory distress. She has no wheezes.   Abdominal: Soft. She exhibits no distension. There is no tenderness.   Musculoskeletal: She exhibits tenderness. She exhibits no edema.        Lumbar back: She exhibits decreased range of motion.   Neurological: She is alert and oriented to person, place, and time. She has normal strength. She exhibits normal muscle tone.   Skin: Skin is warm and dry. No rash noted.   Psychiatric: She has a normal mood and affect. Her behavior is normal.   Vitals reviewed.    NEUROLOGICAL EXAMINATION:     MENTAL STATUS   Oriented to person, place, and time.     MOTOR EXAM     Strength   Strength 5/5 throughout.       Diagnostic Results:   Labs: Reviewed  X-Ray: Reviewed  US: Reviewed  CT: Reviewed  MRI: Reviewed

## 2019-05-29 NOTE — SUBJECTIVE & OBJECTIVE
Interval History: Patient evaluated bedside, stable vital signs.  Night uneventful.  Good urine output.  Complains still of back pain.    Review of patient's allergies indicates:   Allergen Reactions    Bactrim [sulfamethoxazole-trimethoprim] Hives    Codeine Hives    Penicillins Hives    Shellfish containing products Itching    Sulfa (sulfonamide antibiotics) Hives     Current Facility-Administered Medications   Medication Frequency    acetaminophen tablet 1,000 mg Q8H    acetaminophen tablet 650 mg Q4H PRN    amLODIPine tablet 10 mg Daily    aspirin EC tablet 81 mg Daily    atorvastatin tablet 10 mg Daily    carvedilol tablet 25 mg BID WM    cefTRIAXone injection 1 g Q24H    dextrose 50% injection 12.5 g PRN    dextrose 50% injection 25 g PRN    diphenhydrAMINE capsule 50 mg Q6H PRN    escitalopram oxalate tablet 10 mg Daily    glucagon (human recombinant) injection 1 mg PRN    glucose chewable tablet 16 g PRN    glucose chewable tablet 24 g PRN    heparin (porcine) injection 7,500 Units Q8H    insulin aspart U-100 pen 0-5 Units QID (AC + HS) PRN    levothyroxine tablet 200 mcg Before breakfast    methocarbamol tablet 500 mg QID    naloxone 0.4 mg/mL injection 0.4 mg PRN    ondansetron disintegrating tablet 8 mg Q6H PRN    oxyCODONE immediate release tablet 5 mg Q6H PRN    pantoprazole EC tablet 40 mg Daily    polyethylene glycol packet 17 g BID    senna-docusate 8.6-50 mg per tablet 1 tablet BID PRN    senna-docusate 8.6-50 mg per tablet 1 tablet BID    sodium chloride 0.9% flush 10 mL PRN    sodium chloride 0.9% flush 10 mL PRN    traMADol tablet 50 mg BID PRN    vitamin D 1000 units tablet 1,000 Units Daily       Objective:     Vital Signs (Most Recent):  Temp: 98.3 °F (36.8 °C) (05/29/19 1152)  Pulse: 69 (05/29/19 1152)  Resp: 18 (05/29/19 1152)  BP: 125/62 (05/29/19 1152)  SpO2: 96 % (05/29/19 1152)  O2 Device (Oxygen Therapy): room air (05/29/19 0530) Vital Signs (24h  Range):  Temp:  [96.8 °F (36 °C)-98.3 °F (36.8 °C)] 98.3 °F (36.8 °C)  Pulse:  [61-71] 69  Resp:  [17-19] 18  SpO2:  [90 %-100 %] 96 %  BP: (125-152)/(62-74) 125/62     Weight: 113.4 kg (250 lb) (05/23/19 1903)  Body mass index is 45.73 kg/m².  Body surface area is 2.23 meters squared.    I/O last 3 completed shifts:  In: 3700 [P.O.:3700]  Out: 2700 [Urine:2700]    Physical Exam   Constitutional: She is oriented to person, place, and time. She appears well-developed and well-nourished. No distress.   HENT:   Head: Normocephalic and atraumatic.   Mouth/Throat: No oropharyngeal exudate.   Eyes: Pupils are equal, round, and reactive to light. Conjunctivae and EOM are normal. No scleral icterus.   Neck: Normal range of motion. Neck supple. No JVD present. No thyromegaly present.   Cardiovascular: Normal rate and regular rhythm.   Pulmonary/Chest: Effort normal and breath sounds normal. No respiratory distress. She has no wheezes. She exhibits no tenderness.   Abdominal: Soft. Bowel sounds are normal. There is tenderness. There is no guarding.   Genitourinary:   Genitourinary Comments: Sheth in place   Musculoskeletal: She exhibits tenderness (flank and back). She exhibits no edema.   Lymphadenopathy:     She has no cervical adenopathy.   Neurological: She is oriented to person, place, and time. No sensory deficit.   BL UE strength 5/5  BL LE strength 4/5   Skin: Skin is warm. No rash noted.   Nursing note and vitals reviewed.      Significant Labs:  CBC:   Recent Labs   Lab 05/29/19  0420   WBC 11.17   RBC 3.99*   HGB 11.1*   HCT 33.7*      MCV 85   MCH 27.8   MCHC 32.9     CMP:   Recent Labs   Lab 05/28/19  0535 05/29/19  0420   * 123*   CALCIUM 9.2 9.4   ALBUMIN 2.4*  --    PROT 6.7  --     137   K 4.4 4.8   CO2 20* 25    103   BUN 39* 41*   CREATININE 2.8* 2.6*   ALKPHOS 82  --    ALT 10  --    AST 10  --    BILITOT 0.2  --      All labs within the past 24 hours have been reviewed.

## 2019-05-29 NOTE — ASSESSMENT & PLAN NOTE
60 yo female s/p mechanical fall with superior L2 compression fracture.   Small bilateral EDH on MRI adjacent to pedicles at L2 with mild thecal sac compression.  L-spine Ct w/ stable superior endplate compression fracture  Per NSGY,    No acute neurosurgical intervention   TLSO brace when out of bed more than 5 minutes or prn for comfort while in bed   Will arrange for pt to follow up in six weeks in outpatient clinic with interval plain films to confirm healing fracture   Cleared for discharge per neurosurgical perspective upon completion of baseline plain films  Previous DEXA w/ osteopenia; initiating Ca and Vit supplements    Due to persistent pain, on Oxy-IR 5 mg Q6 hrs for severe pain, tramadol PRN for moderate.   Scheduled tylenol and methocarbamol  Lidocaine patch  PT/OT recommending SNF

## 2019-05-29 NOTE — PLAN OF CARE
Problem: Occupational Therapy Goal  Goal: Occupational Therapy Goal  Goals to be met by: 6/8/19     Patient will increase functional independence with ADLs by performing:    UE Dressing with [place].  LE Dressing with Modified [place] and Assistive Devices as needed.  Grooming while standing at sink with Modified [place].  Toileting from bedside commode with Modified [place] for hygiene and clothing management.   Bathing from  edge of bed with Modified [place].  Toilet transfer to bedside commode with Modified [place].  Increased functional strength to WFL for B UE.  Upper extremity exercise program x15 reps per handout, with independence.     Outcome: Ongoing (interventions implemented as appropriate)  Continue with MARTIN Ugarte  5/29/2019

## 2019-05-29 NOTE — ASSESSMENT & PLAN NOTE
Previous extensive CKD workup indicates that HTN and DM2 etiologies of CKD (JB, cryoglobulins, SPEP/UPEP, ANCA's, and complement all wnl's)  Baseline creatinine 1.6/1.7. Elevation during admission. Peak 3.4.  - Strict I/Os and chart  - MAP > 65  - Hb > 7 g/dL  - Avoid nephrotoxic medications, NSAIDs, IV contrast, etc    Cr continues to improve; 2.0->3.4->2.8->2.6  Voiding trial successful; producing urine  Urine sediment by MD 5/27/2019: some muddy brown casts, abundant WBCs, abundant hyaline casts-> suggest ATN  Holding lasix and lisinopril  Continue to monitor RF

## 2019-05-29 NOTE — PROGRESS NOTES
[facility]  Nephrology  Progress Note    Patient Name: Kathie Quezada  MRN: [id number]  Admission Date: 5/23/2019  Hospital Length of Stay: 5 days  Attending Provider: Tracy Barnes MD   Primary Care Physician: Elan Price MD  Principal Problem:ARUNA (acute kidney injury)    Subjective:     HPI: 60 y/o Black or -American woman with CKD3b (baseline sCr 1.6-1.7), HFpEF, DM2, HTN, dyslipidemia, hypothyroidism, GERD, hx of CVA no residual deficits, hx HCV s/p Harvoni (2015), [place] [facility] [place] and found to have an L2 compression fracture with bilateral epidural hematomas.  She arrived with sCr at baseline 1.7 on 5/24, but subsequently increased to 2 on 5/25, 2.3 on 5/26, and 3.4 on 5/27.  At the ER on 5/24, patient presented with urinary retention > 999 mL in bladder scan.  I/O cath.  Again developed urinary retention next day, and placed on mera catheter since 5/25.    Nephrology consulted for evaluation of Aruna on CKD.    Interval History: Patient evaluated bedside, stable vital signs.  Night uneventful.  Good urine output.  Complains still of back pain.    Review of patient's allergies indicates:   Allergen Reactions    Bactrim [sulfamethoxazole-trimethoprim] Hives    Codeine Hives    Penicillins Hives    Shellfish containing products Itching    Sulfa (sulfonamide antibiotics) Hives     Current Facility-Administered Medications   Medication Frequency    acetaminophen tablet 1,000 mg Q8H    acetaminophen tablet 650 mg Q4H PRN    amLODIPine tablet 10 mg Daily    aspirin EC tablet 81 mg Daily    atorvastatin tablet 10 mg Daily    carvedilol tablet 25 mg BID WM    cefTRIAXone injection 1 g Q24H    dextrose 50% injection 12.5 g PRN    dextrose 50% injection 25 g PRN    diphenhydrAMINE capsule 50 mg Q6H PRN    escitalopram oxalate tablet 10 mg Daily    glucagon (human recombinant) injection 1 mg PRN    glucose chewable tablet 16 g PRN    glucose  chewable tablet 24 g PRN    heparin (porcine) injection 7,500 Units Q8H    insulin aspart U-100 pen 0-5 Units QID (AC + HS) PRN    levothyroxine tablet 200 mcg Before breakfast    methocarbamol tablet 500 mg QID    naloxone 0.4 mg/mL injection 0.4 mg PRN    ondansetron disintegrating tablet 8 mg Q6H PRN    oxyCODONE immediate release tablet 5 mg Q6H PRN    pantoprazole EC tablet 40 mg Daily    polyethylene glycol packet 17 g BID    senna-docusate 8.6-50 mg per tablet 1 tablet BID PRN    senna-docusate 8.6-50 mg per tablet 1 tablet BID    sodium chloride 0.9% flush 10 mL PRN    sodium chloride 0.9% flush 10 mL PRN    traMADol tablet 50 mg BID PRN    vitamin D 1000 units tablet 1,000 Units Daily       Objective:     Vital Signs (Most Recent):  Temp: 98.3 °F (36.8 °C) (05/29/19 1152)  Pulse: 69 (05/29/19 1152)  Resp: 18 (05/29/19 1152)  BP: 125/62 (05/29/19 1152)  SpO2: 96 % (05/29/19 1152)  O2 Device (Oxygen Therapy): room air (05/29/19 0530) Vital Signs (24h Range):  Temp:  [96.8 °F (36 °C)-98.3 °F (36.8 °C)] 98.3 °F (36.8 °C)  Pulse:  [61-71] 69  Resp:  [17-19] 18  SpO2:  [90 %-100 %] 96 %  BP: (125-152)/(62-74) 125/62     Weight: 113.4 kg (250 lb) (05/23/19 1903)  Body mass index is 45.73 kg/m².  Body surface area is 2.23 meters squared.    I/O last 3 completed shifts:  In: 3700 [P.O.:3700]  Out: 2700 [Urine:2700]    Physical Exam   Constitutional: She is oriented to person, place, and time. She appears well-developed and well-nourished. No distress.   HENT:   Head: Normocephalic and atraumatic.   Mouth/Throat: No oropharyngeal exudate.   Eyes: Pupils are equal, round, and reactive to light. Conjunctivae and EOM are normal. No scleral icterus.   Neck: Normal range of motion. Neck supple. No JVD present. No thyromegaly present.   Cardiovascular: Normal rate and regular rhythm.   Pulmonary/Chest: Effort normal and breath sounds normal. No respiratory distress. She has no wheezes. She exhibits no  tenderness.   Abdominal: Soft. Bowel sounds are normal. There is tenderness. There is no guarding.   Genitourinary:   Genitourinary Comments: Sheth in place   Musculoskeletal: She exhibits tenderness (flank and back). She exhibits no edema.   Lymphadenopathy:     She has no cervical adenopathy.   Neurological: She is oriented to person, place, and time. No sensory deficit.   BL UE strength 5/5  BL LE strength 4/5   Skin: Skin is warm. No rash noted.   Nursing note and vitals reviewed.      Significant Labs:  CBC:   Recent Labs   Lab 05/29/19  0420   WBC 11.17   RBC 3.99*   HGB 11.1*   HCT 33.7*      MCV 85   MCH 27.8   MCHC 32.9     CMP:   Recent Labs   Lab 05/28/19  0535 05/29/19  0420   * 123*   CALCIUM 9.2 9.4   ALBUMIN 2.4*  --    PROT 6.7  --     137   K 4.4 4.8   CO2 20* 25    103   BUN 39* 41*   CREATININE 2.8* 2.6*   ALKPHOS 82  --    ALT 10  --    AST 10  --    BILITOT 0.2  --      All labs within the past 24 hours have been reviewed.       Assessment/Plan:     * ARNUA (acute kidney injury)  Patient with acute kidney injury over chronic kidney disease baseline sCr 1.6-1.7.  Patient with urinary retention, also hemodynamic instability with elevated blood pressures ~160-180s/80-90s for several days and sudden drop to 90s/50s, causing prerenal ischemic ATN.  Patient also with febrile episodes concern for infectious etiology (could explain her sudden onset of low blood pressures, along with opioid use for pain control).  While this was all going on and ongoing ARUNA, patient received aggressive Vancomycin dose, vanc random level after ~13 hrs at supratherapeutic levels 5/27.    Urine sediment by MD 5/27/2019: some muddy brown casts, abundant WBCs, abundant hyaline casts    Plan:  - F/u Urine culture (patient on IV abx Cefepime and Vancomycin)  - Careful with vanc dosing, specially in view of sCr continues to be improving  - Renal function panel daily   - Strict I/Os and chart  - MAP >  65  - Hb > 7 g/dL  - Avoid nephrotoxic medications, NSAIDs, IV contrast, etc.  - Will follow closely  - Thus far, no RRT needed at this time    CKD stage G3b/A3, GFR 30-44 and albumin creatinine ratio >300 mg/g  - CKD3b (baseline sCr 1.6-1.7)  - Refer to Eliezer    Closed compression fracture of L2 lumbar vertebra  - Managed by primary team        Thank you for your consult. I will follow-up with patient. Please contact us if you have any additional questions.    Chris Houston MD  Nephrology  [facility]

## 2019-05-29 NOTE — ASSESSMENT & PLAN NOTE
Febrile intermittently after admission  WCC peaked 15.71, now wnl after BSABx  Treating for potential PNA in setting of acute hypoxic/hypercapnic resp failure  Considerations include inflammatory reaction in setting of fracture as well   CXR prior to RR negative for acute process, UA w/o infection, blood cx pending but low suspicion for +  Abx started following rapid response during which there was concern for hypotension, which was likely due to cuff misplacement  However, due to ongoing fevers w/o source, covered broadly with renally dosed vanc and cefepime  Narrowed to ceftriaxone to complete 5 total day course for CAP; end date 5/30

## 2019-05-29 NOTE — ASSESSMENT & PLAN NOTE
Patient with acute kidney injury over chronic kidney disease baseline sCr 1.6-1.7.  Patient with urinary retention, also hemodynamic instability with elevated blood pressures ~160-180s/80-90s for several days and sudden drop to 90s/50s, causing prerenal ischemic ATN.  Patient also with febrile episodes concern for infectious etiology (could explain her sudden onset of low blood pressures, along with opioid use for pain control).  While this was all going on and ongoing ARUNA, patient received aggressive Vancomycin dose, vanc random level after ~13 hrs at supratherapeutic levels 5/27.    Urine sediment by MD 5/27/2019: some muddy brown casts, abundant WBCs, abundant hyaline casts    Plan:  - F/u Urine culture (patient on IV abx Cefepime and Vancomycin)  - Careful with vanc dosing, specially in view of sCr continues to be improving  - Renal function panel daily   - Strict I/Os and chart  - MAP > 65  - Hb > 7 g/dL  - Avoid nephrotoxic medications, NSAIDs, IV contrast, etc.  - Will follow closely  - Thus far, no RRT needed at this time

## 2019-05-29 NOTE — CONSULTS
Inpatient consult to Physical Medicine Rehab  Consult performed by: WHITNEY Mitchell  Consult ordered by: Tracy Barnes MD  Reason for consult: debility      Additional consult placed.  See consult note from today for full evaluation and recommendations.     SARA Liu, JOSHP-C  Physical Medicine & Rehabilitation   05/29/2019  Spectralink: [id number]

## 2019-05-29 NOTE — ASSESSMENT & PLAN NOTE
"60 yo female s/p mechanical fall with superior L2 compression fracture.  After presentation to ED, c/o of urinary retnetion w/ bladder scan significant for >1L retained urine and UOP w/ mera approx 1200cc.  Pt denies previous issue with urinary of bowel incontinence or retention.   Potential etiologies of acute retention include spinal compression, pain, pain medication received upon admission, UTI.   Per NSGY, "degree of canal compression not typical for development of acute severe polyradiculopathy."    UTI negative for infection.   Renal U/S w/o acute process  Oxy is available for pain control but try to use non-narcotics  Neuro-checks q4 hours  Voiding trial 5/28 w/ successful UOP    Resolved  "

## 2019-05-29 NOTE — ASSESSMENT & PLAN NOTE
PT/OT  TLSO brace with OOB >5 minutes  Encourage mobility, OOB in chair, and early ambulation as appropriate

## 2019-05-29 NOTE — PLAN OF CARE
Problem: Physical Therapy Goal  Goal: Physical Therapy Goal  Goals to be met by: 19    Patient will increase functional independence with mobility by performin. Supine to sit with Stand-by Assistance  2. Rolling to Left and Right with Stand-by Assistance using log rolling technique   3. Sit to stand transfer with Stand-by Assistance  4. Bed to chair transfer with Stand-by Assistance using Rolling Walker  5. Gait x25 feet with Stand-by Assistance using Rolling Walker  6. Sitting at edge of bed x20 minutes with Stand-by Assistance     Outcome: Ongoing (interventions implemented as appropriate)    Patient mod A to stand with RW and take 6 steps from bed to bedside chair.

## 2019-05-29 NOTE — PT/OT/SLP PROGRESS
Occupational Therapy   Treatment    Name: Kathie Quezada  MRN: [id number]  Admitting Diagnosis:  ARUNA (acute kidney injury)       Recommendations:     Discharge Recommendations: nursing facility, skilled  Discharge Equipment Recommendations:  commode  Barriers to discharge:  Decreased caregiver support    Assessment:     Kathie Quezada is a 59 y.o. female with a medical diagnosis of ARUNA (acute kidney injury).  She is agreeable to OT treat this day despite pain. Patient found up in chair with bedpan reporting pain and inability to have a BM. Performance deficits affecting function are weakness, impaired endurance, impaired self care skills, impaired functional mobilty, gait instability, impaired balance, decreased upper extremity function, decreased lower extremity function, pain, decreased ROM, impaired cardiopulmonary response to activity, impaired joint extensibility, orthopedic precautions.     Rehab Prognosis:  Good; patient would benefit from acute skilled OT services to address these deficits and reach maximum level of function.       Plan:     Patient to be seen 5 x/week to address the above listed problems via self-care/home management, therapeutic activities, therapeutic exercises  · Plan of Care Expires: 06/08/19  · Plan of Care Reviewed with: patient    Subjective     Pain/Comfort:  · Pain Rating 1: 8/10  · Location - Side 1: Bilateral  · Location - Orientation 1: generalized  · Location 1: back  · Pain Addressed 1: Distraction, Reposition  · Pain Rating Post-Intervention 1: 8/10    Objective:     Communicated with: RN prior to session.  Patient found up in chair with telemetry, pulse ox (continuous), TLSO upon OT entry to room.    General Precautions: Standard, fall   Orthopedic Precautions:spinal precautions   Braces: TLSO     Occupational Performance:     Bed Mobility:    · Patient completed Scooting/Bridging to [facility] [place] with minimum assistance  · Patient completed Sit to Supine via log roll  with moderate assistance for BLE placement onto bed    Functional Mobility/Transfers:  · Patient completed Sit <> Stand Transfer with moderate assistance  with  OT BUE assist   · Patient completed Toilet Transfer Stand Pivot technique with moderate assistance with  OT BUE assist from chair to BSC  · Functional Mobility: Patient ambulated approx 2' from chair>BSC>bed with min A utilizing OT BUE assistance with TLSO brace donned.    Activities of Daily Living:  · Grooming: supervision Patient completed face washing while sitting on the BSC with SPV.   · Toileting: Patient attempted BM in bed pan and on BSC but was unable to.      [facility] 6 Click ADL: 8    Treatment & Education:  Role of OT and POC.  Reviewed spinal precautions with patient.  Call button for assistance.    Patient left HOB elevated with call button in reach and RN notifiedEducation:      GOALS:   Multidisciplinary Problems     Occupational Therapy Goals        Problem: Occupational Therapy Goal    Goal Priority Disciplines Outcome Interventions   Occupational Therapy Goal     OT, PT/OT Ongoing (interventions implemented as appropriate)    Description:  Goals to be met by: 6/8/19     Patient will increase functional independence with ADLs by performing:    UE Dressing with Stephens.  LE Dressing with Modified Stephens and Assistive Devices as needed.  Grooming while standing at sink with Modified Stephens.  Toileting from bedside commode with Modified Stephens for hygiene and clothing management.   Bathing from  edge of bed with Modified Stephens.  Toilet transfer to bedside commode with Modified Stephens.  Increased functional strength to WFL for B UE.  Upper extremity exercise program x15 reps per handout, with independence.                      Time Tracking:     OT Date of Treatment: 05/29/19  OT Start Time: 1317  OT Stop Time: 1336  OT Total Time (min): 19 min    Billable Minutes:Self Care/Home Management 19 minutes    Lizette  Carlos, OT  5/29/2019

## 2019-05-30 VITALS
HEIGHT: 62 IN | WEIGHT: 250 LBS | TEMPERATURE: 97 F | HEART RATE: 70 BPM | SYSTOLIC BLOOD PRESSURE: 147 MMHG | RESPIRATION RATE: 17 BRPM | OXYGEN SATURATION: 96 % | BODY MASS INDEX: 46.01 KG/M2 | DIASTOLIC BLOOD PRESSURE: 67 MMHG

## 2019-05-30 PROBLEM — N17.0 ATN (ACUTE TUBULAR NECROSIS): Status: ACTIVE | Noted: 2019-02-08

## 2019-05-30 LAB
ANION GAP SERPL CALC-SCNC: 9 MMOL/L (ref 8–16)
BACTERIA BLD CULT: NORMAL
BACTERIA BLD CULT: NORMAL
BACTERIA UR CULT: NO GROWTH
BUN SERPL-MCNC: 37 MG/DL (ref 6–20)
CALCIUM SERPL-MCNC: 9.2 MG/DL (ref 8.7–10.5)
CHLORIDE SERPL-SCNC: 105 MMOL/L (ref 95–110)
CO2 SERPL-SCNC: 22 MMOL/L (ref 23–29)
CREAT SERPL-MCNC: 2.4 MG/DL (ref 0.5–1.4)
EST. GFR  (AFRICAN AMERICAN): 25.1 ML/MIN/1.73 M^2
EST. GFR  (NON AFRICAN AMERICAN): 21.8 ML/MIN/1.73 M^2
GLUCOSE SERPL-MCNC: 98 MG/DL (ref 70–110)
POCT GLUCOSE: 189 MG/DL (ref 70–110)
POTASSIUM SERPL-SCNC: 4.6 MMOL/L (ref 3.5–5.1)
SODIUM SERPL-SCNC: 136 MMOL/L (ref 136–145)

## 2019-05-30 PROCEDURE — 63600175 PHARM REV CODE 636 W HCPCS: Performed by: INTERNAL MEDICINE

## 2019-05-30 PROCEDURE — 99232 PR SUBSEQUENT HOSPITAL CARE,LEVL II: ICD-10-PCS | Mod: ,,, | Performed by: NURSE PRACTITIONER

## 2019-05-30 PROCEDURE — 25000003 PHARM REV CODE 250: Performed by: INTERNAL MEDICINE

## 2019-05-30 PROCEDURE — 25000003 PHARM REV CODE 250: Performed by: STUDENT IN AN ORGANIZED HEALTH CARE EDUCATION/TRAINING PROGRAM

## 2019-05-30 PROCEDURE — 63600175 PHARM REV CODE 636 W HCPCS: Performed by: STUDENT IN AN ORGANIZED HEALTH CARE EDUCATION/TRAINING PROGRAM

## 2019-05-30 PROCEDURE — 99239 HOSP IP/OBS DSCHRG MGMT >30: CPT | Mod: GC,,, | Performed by: HOSPITALIST

## 2019-05-30 PROCEDURE — 25000003 PHARM REV CODE 250: Performed by: HOSPITALIST

## 2019-05-30 PROCEDURE — 36415 COLL VENOUS BLD VENIPUNCTURE: CPT

## 2019-05-30 PROCEDURE — 97116 GAIT TRAINING THERAPY: CPT

## 2019-05-30 PROCEDURE — 97535 SELF CARE MNGMENT TRAINING: CPT

## 2019-05-30 PROCEDURE — 99239 PR HOSPITAL DISCHARGE DAY,>30 MIN: ICD-10-PCS | Mod: GC,,, | Performed by: HOSPITALIST

## 2019-05-30 PROCEDURE — 97530 THERAPEUTIC ACTIVITIES: CPT

## 2019-05-30 PROCEDURE — 99232 SBSQ HOSP IP/OBS MODERATE 35: CPT | Mod: ,,, | Performed by: NURSE PRACTITIONER

## 2019-05-30 PROCEDURE — 80048 BASIC METABOLIC PNL TOTAL CA: CPT

## 2019-05-30 RX ORDER — LISINOPRIL 40 MG/1
20 TABLET ORAL DAILY
Start: 2019-05-30 | End: 2019-07-31

## 2019-05-30 RX ORDER — TRAMADOL HYDROCHLORIDE 50 MG/1
50 TABLET ORAL 2 TIMES DAILY PRN
Start: 2019-05-30 | End: 2019-05-30

## 2019-05-30 RX ORDER — LEVOTHYROXINE SODIUM 200 UG/1
200 TABLET ORAL
Qty: 30 TABLET | Refills: 2
Start: 2019-05-30 | End: 2019-07-08 | Stop reason: SDUPTHER

## 2019-05-30 RX ORDER — OXYCODONE HYDROCHLORIDE 5 MG/1
5 TABLET ORAL EVERY 6 HOURS PRN
Refills: 0
Start: 2019-05-30 | End: 2019-06-13

## 2019-05-30 RX ORDER — TRAMADOL HYDROCHLORIDE 50 MG/1
50 TABLET ORAL 2 TIMES DAILY PRN
Start: 2019-05-30 | End: 2019-06-13

## 2019-05-30 RX ORDER — FUROSEMIDE 40 MG/1
40 TABLET ORAL 2 TIMES DAILY
Qty: 60 TABLET | Refills: 0
Start: 2019-05-30 | End: 2019-07-31

## 2019-05-30 RX ORDER — LIDOCAINE 50 MG/G
1 PATCH TOPICAL DAILY
Refills: 0
Start: 2019-05-30 | End: 2019-05-30

## 2019-05-30 RX ORDER — TRAZODONE HYDROCHLORIDE 50 MG/1
50 TABLET ORAL NIGHTLY PRN
Refills: 0
Start: 2019-05-30 | End: 2019-07-08 | Stop reason: SDUPTHER

## 2019-05-30 RX ORDER — METHOCARBAMOL 500 MG/1
500 TABLET, FILM COATED ORAL 4 TIMES DAILY
Qty: 124 TABLET | Refills: 0
Start: 2019-05-30 | End: 2019-06-25 | Stop reason: SDUPTHER

## 2019-05-30 RX ORDER — LIDOCAINE 50 MG/G
1 PATCH TOPICAL DAILY
Refills: 0
Start: 2019-05-30 | End: 2019-06-30

## 2019-05-30 RX ORDER — AMOXICILLIN 250 MG
1 CAPSULE ORAL 2 TIMES DAILY PRN
Start: 2019-05-30 | End: 2019-05-30

## 2019-05-30 RX ORDER — ACETAMINOPHEN 500 MG
1000 TABLET ORAL EVERY 8 HOURS
Refills: 0
Start: 2019-05-30 | End: 2019-06-30

## 2019-05-30 RX ORDER — POLYETHYLENE GLYCOL 3350 17 G/17G
17 POWDER, FOR SOLUTION ORAL 2 TIMES DAILY
Refills: 0
Start: 2019-05-30 | End: 2019-06-25

## 2019-05-30 RX ORDER — AMOXICILLIN 250 MG
2 CAPSULE ORAL 2 TIMES DAILY
Start: 2019-05-30 | End: 2019-06-25

## 2019-05-30 RX ORDER — OXYCODONE HYDROCHLORIDE 5 MG/1
5 TABLET ORAL EVERY 6 HOURS PRN
Refills: 0
Start: 2019-05-30 | End: 2019-05-30

## 2019-05-30 RX ORDER — ATORVASTATIN CALCIUM 40 MG/1
40 TABLET, FILM COATED ORAL NIGHTLY
Start: 2019-05-30 | End: 2019-11-08 | Stop reason: SDUPTHER

## 2019-05-30 RX ADMIN — HEPARIN SODIUM 7500 UNITS: 5000 INJECTION, SOLUTION INTRAVENOUS; SUBCUTANEOUS at 01:05

## 2019-05-30 RX ADMIN — METHOCARBAMOL TABLETS 500 MG: 500 TABLET, COATED ORAL at 08:05

## 2019-05-30 RX ADMIN — CEFTRIAXONE SODIUM 1 G: 1 INJECTION, POWDER, FOR SOLUTION INTRAMUSCULAR; INTRAVENOUS at 12:05

## 2019-05-30 RX ADMIN — OXYCODONE HYDROCHLORIDE 5 MG: 5 TABLET ORAL at 06:05

## 2019-05-30 RX ADMIN — ACETAMINOPHEN 1000 MG: 500 TABLET ORAL at 01:05

## 2019-05-30 RX ADMIN — PANTOPRAZOLE SODIUM 40 MG: 40 TABLET, DELAYED RELEASE ORAL at 08:05

## 2019-05-30 RX ADMIN — VITAMIN D, TAB 1000IU (100/BT) 1000 UNITS: 25 TAB at 08:05

## 2019-05-30 RX ADMIN — OXYCODONE HYDROCHLORIDE 5 MG: 5 TABLET ORAL at 01:05

## 2019-05-30 RX ADMIN — ASPIRIN 81 MG: 81 TABLET, COATED ORAL at 08:05

## 2019-05-30 RX ADMIN — HEPARIN SODIUM 7500 UNITS: 5000 INJECTION, SOLUTION INTRAVENOUS; SUBCUTANEOUS at 05:05

## 2019-05-30 RX ADMIN — CARVEDILOL 25 MG: 25 TABLET, FILM COATED ORAL at 08:05

## 2019-05-30 RX ADMIN — ATORVASTATIN CALCIUM 10 MG: 10 TABLET, FILM COATED ORAL at 08:05

## 2019-05-30 RX ADMIN — AMLODIPINE BESYLATE 10 MG: 10 TABLET ORAL at 08:05

## 2019-05-30 RX ADMIN — LEVOTHYROXINE SODIUM 200 MCG: 100 TABLET ORAL at 05:05

## 2019-05-30 RX ADMIN — CARVEDILOL 25 MG: 25 TABLET, FILM COATED ORAL at 05:05

## 2019-05-30 RX ADMIN — METHOCARBAMOL TABLETS 500 MG: 500 TABLET, COATED ORAL at 05:05

## 2019-05-30 RX ADMIN — METHOCARBAMOL TABLETS 500 MG: 500 TABLET, COATED ORAL at 01:05

## 2019-05-30 RX ADMIN — ESCITALOPRAM OXALATE 10 MG: 5 TABLET, FILM COATED ORAL at 08:05

## 2019-05-30 RX ADMIN — ACETAMINOPHEN 1000 MG: 500 TABLET ORAL at 05:05

## 2019-05-30 RX ADMIN — TRAMADOL HYDROCHLORIDE 50 MG: 50 TABLET, FILM COATED ORAL at 01:05

## 2019-05-30 NOTE — ASSESSMENT & PLAN NOTE
60 yo female s/p mechanical fall with superior L2 compression fracture.   Small bilateral EDH on MRI adjacent to pedicles at L2 with mild thecal sac compression.  L-spine Ct w/ stable superior endplate compression fracture  Per NSGY,    No acute neurosurgical intervention   TLSO brace when out of bed more than 5 minutes or prn for comfort while in bed   Will arrange for pt to follow up in six weeks in outpatient clinic with interval plain films to confirm healing fracture   Cleared for discharge per neurosurgical perspective upon completion of baseline plain films  Previous DEXA w/ osteopenia; initiating Ca and Vit supplements    Due to persistent pain, on Oxy-IR 5 mg Q6 hrs for severe pain, tramadol PRN for moderate.   Scheduled tylenol and methocarbamol  Lidocaine patch  Discharge to in-pt rehab

## 2019-05-30 NOTE — PROGRESS NOTES
[facility]  Physical Medicine & Rehab  Progress Note    Patient Name: Kathie Quezada  MRN: [id number]  Admission Date: 5/23/2019  Length of Stay: 6 days  Attending Physician: Tracy Barnes MD    Subjective:     Principal Problem:ARUNA (acute kidney injury)    Hospital Course:   5/25/19:  Evaluated by OT.  Bed mobility totalA.  No functional mobility 2/2 pain.    5/27/19:  Evaluated by PT.  Bed mobility max-totalA.  EOB x ~8 minutes max-totalA.  No further functional mobility or transfers 2/2 pain.  Refused OT 2/2 pain and just working with PT.   5/28/19:  Participated with OT.  Bed mobility maxA.  UBD maxA.   5/29/19:  Participated with PT and OT.  Bed mobility min-maxA.  Sit to stand and transfers modA with and without RW.  Ambulated 2 ft Memo (OT) and 6 steps modA with RW (PT).  Grooming SV while seated.      Interval History 5/30/2019:  Patient is seen for follow-up rehab evaluation and recommendations: Pain better controlled.  Improved with therapy yesterday.  BM overnight.      HPI, Past Medical, Family, and Social History remains the same as documented in the initial encounter.    Scheduled Medications:    acetaminophen  1,000 mg Oral Q8H    amLODIPine  10 mg Oral Daily    aspirin  81 mg Oral Daily    atorvastatin  10 mg Oral Daily    carvedilol  25 mg Oral BID WM    escitalopram oxalate  10 mg Oral Daily    heparin (porcine)  7,500 Units Subcutaneous Q8H    lactulose  200 g Rectal TID    levothyroxine  200 mcg Oral Before breakfast    lidocaine  1 patch Transdermal Q24H    methocarbamol  500 mg Oral QID    pantoprazole  40 mg Oral Daily    polyethylene glycol  17 g Oral BID    senna-docusate 8.6-50 mg  1 tablet Oral BID    vitamin D  1,000 Units Oral Daily     PRN Medications: dextrose 50%, dextrose 50%, diphenhydrAMINE, glucagon (human recombinant), glucose, glucose, insulin aspart U-100, naloxone, ondansetron, oxyCODONE, senna-docusate 8.6-50 mg, sodium chloride  0.9%, sodium chloride 0.9%, traMADol    Review of Systems   Constitutional: Positive for activity change. Negative for chills and fever.   HENT: Negative for drooling, hearing loss, trouble swallowing and voice change.    Eyes: Negative for pain and visual disturbance.   Respiratory: Negative for cough, shortness of breath and wheezing.    Cardiovascular: Negative for chest pain and palpitations.   Gastrointestinal: Negative for abdominal distention, constipation, nausea and vomiting.   Genitourinary: Negative for difficulty urinating and flank pain.   Musculoskeletal: Positive for arthralgias, back pain and gait problem. Negative for neck pain.   Skin: Negative for rash and wound.   Neurological: Positive for weakness and numbness. Negative for dizziness and headaches.   Psychiatric/Behavioral: Negative for agitation and hallucinations. The patient is not nervous/anxious.      Objective:     Vital Signs (Most Recent):  Temp: 99 °F (37.2 °C) (05/30/19 0725)  Pulse: 67 (05/30/19 0725)  Resp: 18 (05/30/19 0725)  BP: (!) 150/92 (05/30/19 0725)  SpO2: 98 % (05/30/19 0924)    Vital Signs (24h Range):  Temp:  [97.7 °F (36.5 °C)-99.4 °F (37.4 °C)] 99 °F (37.2 °C)  Pulse:  [65-72] 67  Resp:  [16-20] 18  SpO2:  [91 %-98 %] 98 %  BP: (103-150)/(54-92) 150/92     Physical Exam   Constitutional: She is oriented to person, place, and time. She appears well-developed and well-nourished. No distress.   Obese   HENT:   Head: Normocephalic and atraumatic.   Right Ear: External ear normal.   Left Ear: External ear normal.   Nose: Nose normal.   Eyes: Right eye exhibits no discharge. Left eye exhibits no discharge. No scleral icterus.   Neck: Normal range of motion.   Cardiovascular: Normal rate and intact distal pulses.   Pulmonary/Chest: Effort normal. No respiratory distress. She has no wheezes.   Abdominal: Soft. She exhibits no distension. There is no tenderness.   Musculoskeletal: She exhibits tenderness. She exhibits no edema.         Lumbar back: She exhibits decreased range of motion.   Neurological: She is alert and oriented to person, place, and time. She has normal strength. She exhibits normal muscle tone.   Skin: Skin is warm and dry. No rash noted.   Psychiatric: She has a normal mood and affect. Her behavior is normal.   Vitals reviewed.    Diagnostic Results:   Labs: Reviewed  X-Ray: Reviewed  US: Reviewed  CT: Reviewed  MRI: Reviewed    Assessment/Plan:      * ARUNA (acute kidney injury)  -  Nephrology following    PNA (pneumonia)      -  Completed course of Ceftriaxone on 5/30 for presumed CAP    Acute respiratory failure with hypoxia and hypercapnia  -  Rapid response on 5/26/19--> improved with Narcan and BiPAP    Altered mental status  -  Rapid response on 5/26/19--> improved with Narcan and BiPAP    Closed compression fracture of L2 lumbar vertebra  -  Presented with back pain, BLE weakness/numbess, and urinary retention after fall  -  Imaging showed acute L2 compression fracture with small bilateral epidural hematoma  -  Neurosurgery consulted: no acute surgical intervention--> recommended TLSO brace with OOB >5 minutes and PRN for comfort and clinic follow-up in 6 weeks    Impaired mobility and ADLs  -  (I) with ADLs and mobility at baseline--> fall lead to admission  -  Pain limited--> improving   Recommendations  -  Encourage mobility, OOB in chair, and early ambulation as appropriate  -  PT/OT evaluate and treat  -  DVT prophylaxis  -  Monitor for and prevent skin breakdown and pressure ulcers  -  Monitor Bowel and bladder-  5/30/19  -  Continue pain management- now on scheduled Robaxin and Tylenol, Lidocaine patch     Recommend Inpatient Rehab.  Facility per patient/family preference.  Continue therapy.  Will follow for change in post-acute recommendation.    WHITNEY Keith  Department of Physical Medicine & Rehab   [facility]

## 2019-05-30 NOTE — ASSESSMENT & PLAN NOTE
"58 yo female s/p mechanical fall with superior L2 compression fracture.  After presentation to ED, c/o of urinary retnetion w/ bladder scan significant for >1L retained urine and UOP w/ mera approx 1200cc.  Pt denies previous issue with urinary of bowel incontinence or retention.   Potential etiologies of acute retention include spinal compression, pain, pain medication received upon admission, UTI.   Per NSGY, "degree of canal compression not typical for development of acute severe polyradiculopathy."    UTI negative for infection.   Renal U/S w/o acute process  Oxy is available for pain control but try to use non-narcotics  Neuro-checks q4 hours  Voiding trial 5/28 w/ successful UOP    Resolved  "

## 2019-05-30 NOTE — PLAN OF CARE
[facility]    FACILITY TRANSFER ORDERS      Patient Name: Kathie Quezada  YOB: 1960    PCP: Elan Price MD   PCP Address: [address] [place] [address]  PCP Phone Number: [phone]  PCP Fax: [phone]    Encounter Date: 05/30/2019    Admit to:  Inpatient Rehab    Vital Signs:  Routine    Diagnoses:   Active Hospital Problems    Diagnosis  POA    *ATN (acute tubular necrosis) [N17.0]  Yes    Closed compression fracture of L2 lumbar vertebra [S32.020A]  Yes     Priority: 1 - High    Acute urinary retention [R33.8]  Yes     Priority: 2     Impaired mobility and ADLs [Z74.09]  No    Acute renal failure with specified lesion [N17.8]  Yes    PNA (pneumonia) [J18.9]  Yes    Slow transit constipation [K59.01]  Yes    Altered mental status [R41.82]  No    Acute respiratory failure with hypoxia and hypercapnia [J96.01, J96.02]  No    Fever [R50.9]  Yes    GERD (gastroesophageal reflux disease) [K21.9]  Yes    Hypokalemia [E87.6]  Yes    Chronic diastolic heart failure [I50.32]  Yes    CKD stage G3b/A3, GFR 30-44 and albumin creatinine ratio >300 mg/g [N18.3]  Yes    HTN (hypertension) [I10]  Yes    Hyperlipidemia [E78.5]  Yes    Type 2 diabetes mellitus with stage 3 chronic kidney disease, without long-term current use of insulin [E11.22, N18.3]  Yes    Postoperative hypothyroidism [E89.0]  Yes    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       Allergies:  Review of patient's allergies indicates:   Allergen Reactions    Bactrim [sulfamethoxazole-trimethoprim] Hives    Codeine Hives    Gabapentin      Pruritis     Penicillins Hives    Shellfish containing products Itching    Sulfa (sulfonamide antibiotics) Hives       Diet: diabetic diet: 2000 calorie    Activities: Activity as tolerated    Nursing: Per facility      Labs: BMP in one week after most recent discharge to assess kidney  function improvement     CONSULTS:    Physical Therapy to evaluate and treat.  and Occupational Therapy to evaluate and treat.    MISCELLANEOUS CARE:  Routine Skin for Bedridden Patients: Apply moisture barrier cream to all skin folds and wet areas in perineal area daily and after baths and all bowel movements.    WOUND CARE ORDERS  None    Medications: Review discharge medications with patient and family and provide education.      Current Discharge Medication List      START taking these medications    Details   acetaminophen (TYLENOL) 500 MG tablet Take 2 tablets (1,000 mg total) by mouth every 8 (eight) hours.  Refills: 0      lidocaine (LIDODERM) 5 % Place 1 patch onto the skin once daily. Apply to low back. Remove & Discard patch within 12 hours or as directed by MD  Refills: 0      methocarbamol (ROBAXIN) 500 MG Tab Take 1 tablet (500 mg total) by mouth 4 (four) times daily.  Qty: 124 tablet, Refills: 0      oxyCODONE (ROXICODONE) 5 MG immediate release tablet Take 1 tablet (5 mg total) by mouth every 6 (six) hours as needed (for severe pain (pain scale 7-10)).  Refills: 0      polyethylene glycol (GLYCOLAX) 17 gram PwPk Take 17 g by mouth 2 (two) times daily.  Refills: 0      senna-docusate 8.6-50 mg (PERICOLACE) 8.6-50 mg per tablet Take 2 tablets by mouth 2 (two) times daily.      traMADol (ULTRAM) 50 mg tablet Take 1 tablet (50 mg total) by mouth 2 (two) times daily as needed (for moderate pain (pain scale 4-6)).         CONTINUE these medications which have CHANGED    Details   atorvastatin (LIPITOR) 40 MG tablet Take 1 tablet (40 mg total) by mouth every evening.      furosemide (LASIX) 40 MG tablet      HOLD FOR NOW UNTIL RE-ASSESS KIDNEY FUNCTION Take 1 tablet (40 mg total) by mouth 2 (two) times daily. HOLD THIS MED UNTIL MD RE-ASSESSES KIDNEY FUNCTION  Qty: 60 tablet, Refills: 0      lisinopril (PRINIVIL,ZESTRIL) 40 MG tablet      HOLD FOR NOW UNTIL RE-ASSESS KIDNEY FUNCTION Take 0.5 tablets (20 mg  "total) by mouth once daily. HOLD THIS MED UNTIL MD RE-ASSESSES KIDNEY FUNCTION      SYNTHROID 200 mcg tablet Take 1 tablet (200 mcg total) by mouth before breakfast. BRAND NAME ONLY  Qty: 30 tablet, Refills: 2         CONTINUE these medications which have NOT CHANGED    Details   amLODIPine (NORVASC) 10 MG tablet Take 1 tablet (10 mg total) by mouth once daily.  Qty: 30 tablet, Refills: 11      aspirin (ECOTRIN) 81 MG EC tablet Take 1 tablet (81 mg total) by mouth once daily.  Qty: 30 tablet, Refills: 0      blood sugar diagnostic Strp To check BG 1 times daily, to use with insurance preferred meter  Qty: 35 strip, Refills: 11    Associated Diagnoses: Type 2 diabetes mellitus with stage 3 chronic kidney disease, with long-term current use of insulin      carvedilol (COREG) 25 MG tablet Take 25 mg by mouth 2 (two) times daily with meals.       escitalopram oxalate (LEXAPRO) 10 MG tablet Take 10 mg by mouth once daily.      lancets Misc To check BG 1 times daily, to use with insurance preferred meter  Qty: 50 each, Refills: 11    Associated Diagnoses: Type 2 diabetes mellitus with stage 3 chronic kidney disease, with long-term current use of insulin      pantoprazole (PROTONIX) 40 MG tablet Take 1 tablet (40 mg total) by mouth once daily.  Qty: 90 tablet, Refills: 3      traZODone (DESYREL) 50 MG tablet Take 50 mg by mouth nightly as needed for insomnia.  Refills: 0      pen needle, diabetic (BD ULTRA-FINE OLGA LIDIA PEN NEEDLE) 32 gauge x 5/32" Ndle To use with Lantus solostar insulin pens  Qty: 100 each, Refills: 3    Associated Diagnoses: Type 2 diabetes mellitus with diabetic peripheral angiopathy without gangrene, with long-term current use of insulin         STOP taking these medications       hydrALAZINE (APRESOLINE) 25 MG tablet Comments:   Reason for Stopping:         gabapentin (NEURONTIN) 100 MG capsule Comments:   Reason for Stopping:         naproxen (NAPROSYN) 500 MG tablet Comments:   Reason for Stopping:     "                _________________________________  Ramone Blanco MD  05/30/2019

## 2019-05-30 NOTE — PLAN OF CARE
Pt accepted to [facility] as per Le in admissions.  [facility] RN should call report to [phone].  Transportation order for WC van placed in pt chart for 430pm, and all questions regarding transfer should be directed to extension [phone].  Transport packet printed and sent to nurses's station desk on 5th floor.              Allyson Kent LMSW  Ext [phone]

## 2019-05-30 NOTE — PT/OT/SLP PROGRESS
Occupational Therapy   Treatment    Name: Kathie Quezada  MRN: [id number]  Admitting Diagnosis:  ATN (acute tubular necrosis)       Recommendations:     Discharge Recommendations: rehabilitation facility  Discharge Equipment Recommendations:  other (see comments)(TBD closer to d/c)  Barriers to discharge:  Decreased caregiver support    Assessment:     Kathie Quezada is a 59 y.o. female with a medical diagnosis of ATN (acute tubular necrosis).  She is found in bed reporting that she spent several hours sitting up in chair and just received pain medication. Patient agreeable to OT treat this day. She completed bed mobility via log rolling with SPV and donned/doffed TLSO sitting EOB with total assistance. Patient educated on use of reacher/sock aid for lower body dressing but declined further participation 2' to increasing pain in her back. Performance deficits affecting function are weakness, impaired endurance, impaired self care skills, impaired functional mobilty, pain, gait instability, orthopedic precautions.     Rehab Prognosis:  Good; patient would benefit from acute skilled OT services to address these deficits and reach maximum level of function.       Plan:     Patient to be seen 5 x/week to address the above listed problems via self-care/home management, therapeutic activities, therapeutic exercises  · Plan of Care Expires: 06/08/19  · Plan of Care Reviewed with: patient    Subjective     Pain/Comfort:  · Pain Rating 1: 5/10  · Location - Side 1: Bilateral  · Location - Orientation 1: generalized  · Location 1: back  · Pain Addressed 1: Pre-medicate for activity, Reposition, Cessation of Activity  · Pain Rating Post-Intervention 1: 7/10    Objective:     Communicated with: RN prior to session.  Patient found supine with TLSO, telemetry, PureWick upon OT entry to room.    General Precautions: Standard, fall   Orthopedic Precautions:spinal precautions   Braces: TLSO     Occupational  Performance:     Bed Mobility:    · Patient completed log rolling to Right with stand by assistance with min verbal cueing for proper technique  · Patient completed Scooting to the EOB with stand by assistance   · Patient completed Supine to Sit with stand by assistance     Functional Mobility/Transfers:  · Patient declined participation in functional mobility/OOB transfers 2' increased pain in back.    Activities of Daily Living:  · Lower Body Dressing: Patient doffed both socks sitting EOB with SPV using the reacher but required total assistance to don socks again 2' pain in back without BUE support while sitting declining use of sock aid to do so.  · Doffed/donned TLSO sitting EOB with total assistance.      [facility] 6 Click ADL: 11    Treatment & Education:  Role of OT/OT POC  Reviewed spinal precautions  Educated on use of AE for lower body dressing  Call button for assistance    Patient left supine with call button in reach and RN notifiedEducation:      GOALS:   Multidisciplinary Problems     Occupational Therapy Goals        Problem: Occupational Therapy Goal    Goal Priority Disciplines Outcome Interventions   Occupational Therapy Goal     OT, PT/OT Ongoing (interventions implemented as appropriate)    Description:  Goals to be met by: 6/8/19     Patient will increase functional independence with ADLs by performing:    UE Dressing with [place].  LE Dressing with Modified [place] and Assistive Devices as needed.  Grooming while standing at sink with Modified [place].  Toileting from bedside commode with Modified [place] for hygiene and clothing management.   Bathing from  edge of bed with Modified [place].  Toilet transfer to bedside commode with Modified [place].  Increased functional strength to WFL for B UE.  Upper extremity exercise program x15 reps per handout, with independence.                      Time Tracking:     OT Date of Treatment: 05/30/19  OT Start Time: 1355  OT  Stop Time: 1410  OT Total Time (min): 15 min    Billable Minutes:Self Care/Home Management 15    Lizette Gonzalez OT  5/30/2019

## 2019-05-30 NOTE — PLAN OF CARE
Problem: Occupational Therapy Goal  Goal: Occupational Therapy Goal  Goals to be met by: 6/8/19     Patient will increase functional independence with ADLs by performing:    UE Dressing with [place].  LE Dressing with Modified [place] and Assistive Devices as needed.  Grooming while standing at sink with Modified [place].  Toileting from bedside commode with Modified [place] for hygiene and clothing management.   Bathing from  edge of bed with Modified [place].  Toilet transfer to bedside commode with Modified [place].  Increased functional strength to WFL for B UE.  Upper extremity exercise program x15 reps per handout, with independence.     Outcome: Ongoing (interventions implemented as appropriate)  Progressing with POC 5/30/19  Lizetet Gonzalez OTR  5/30/2019

## 2019-05-30 NOTE — ASSESSMENT & PLAN NOTE
PT/OT  TLSO brace with OOB >5 minutes  Encourage mobility, OOB in chair, and early ambulation as appropriate  In-pt rehab upon d/c

## 2019-05-30 NOTE — DISCHARGE SUMMARY
"[facility] Hospital Medicine  Discharge Summary      Patient Name: Kathie Quezada  MRN: [id number]  Admission Date: 5/23/2019  Hospital Length of Stay: 6 days  Discharge Date and Time:  05/30/2019 2:35 PM  Attending Physician: Tracy Barnes MD   Discharging Provider: Mita Ceron MD  Primary Care Provider: Elan Price MD  Hospital Medicine Team: [facility] MED 4 Mita Ceron MD    HPI:   Kathie Quezada is a 58 y.o. woman with a history of HFpEF (EF: 60% 1/19), CKD Stage G3a/A3 c/b Nephrotic Range Proteinuria, DM2 (A1c: 5.2%) not taking Insulin at home, HTN, HLD, Hypothyroidism (s/p Thyroidectomy), GERD, Hx of CVA (no residual deficits) and Hx HCV s/p Harvoni (2015) with reported undetectable VL, and chronic back pain 2/2 T12 fracture who presents to [facility] ED via EMS after a fall at home. Pt reports picking up her grand-daughter, becoming unbalanced, and falling on her back. She denies any dizziness, lightheadedness, or other pre-syncopal symptoms prior to the fall. She also denies any head trauma. However, she notes a "crack" upon her impact with the ground in addition to the onset of pain. She also had "no feeling in my legs" and experienced a loss of sensation until EMS arrived at the scene. Pt denies any recent exacerbations related to her chronic co-morbidities. Her baseline back pain is about a 5/10 and she takes no medication for it; her T12 fracture occurred approx 3 years ago. She denies any recent exacerbations in terms of SOB, leg swelling, and BP control. She further denies any F/C, N/V, diarrhea, constipation, dysuria, CP, palpitations.     While in the ED, Pt was reported to complain of urinary retention. A bladder scan revealed >1L and Pt had UOP of 1200cc w/ insertion of a urinary catheter. Pt is uncertain if it was her pain that limited her ability to urinate. She has not had issues with urinary retention, incontinence, decreased stream, straining to urinate " previously.     * No surgery found *      Hospital Course:   60yo woman with HFpEF, CKD3, DM2 not taking insulin at home with peripheral neuropathy, HTN, HLD, hypothyroidism (s/p thyroidectomy), GERD, hx of CVA (no residual deficits), hx of HCV s/p Harvoni (2015) with reported undetectable VL, and chronic back pain 2/2 T12 fracture who presents following a mechanical fall at home, found to have acute L2 compression fracture with small bilateral epidural hematoma. Initially with bilateral lower extremity weakness and concern for urinary retention s/p mera placement in ED. MRI and CT imaging obtained; per NSGY low suspicion for compressive polyradiculopathy. TLSO brace in place when OOB.     Admitted to medicine for pain control, PT/OT evaluation. On 5/26, developed acute hypoxic/hypercapnic respiratory failure, likely 2/2 accidental opioid overdose with undiagnosed SERA and restriction from brace. Improved with narcan and BiPAP. Also found to have fevers to 103. Unclear precipitant of fevers, potentially PNA. Initiated on BSAbx and re-cultured; de-escalated to ceftriaxone for 5 day course. Does have ARUNA on CKD3 which worsened over few days, now peaked and now resolving. Per muddy casts in urine, ATN from [facility] drug induced vs ischemic (did have recorded low BP during rapid response on 5/26, although this may have been 2/2 poor cuff fit). Urinary retention also resolved.     Ultimately, she will need 6 week NSGY f/u with repeat plain films to confirm healing and continue TLSO brace when OOB. Discharged to In-Pt rehab.      Vitals:    05/30/19 1128 05/30/19 1130 05/30/19 1215 05/30/19 1357   BP:  (!) 129/59 132/73    BP Location:       Patient Position:       Pulse:  69 68    Resp:  17     Temp:  97.2 °F (36.2 °C) 97 °F (36.1 °C)    TempSrc:       SpO2: 98% 96% 96% 98%   Weight:       Height:         Physical Exam   Constitutional: She is oriented to person, place, and time. No distress.   HENT:   Head: Normocephalic  and atraumatic.   Right Ear: External ear normal.   Left Ear: External ear normal.   Mouth/Throat: Oropharynx is clear and moist. No oropharyngeal exudate.   Eyes: Pupils are equal, round, and reactive to light. Conjunctivae and EOM are normal. No scleral icterus.   Neck: Normal range of motion. Neck supple. No JVD present. No thyromegaly present.   Cardiovascular: Normal rate, regular rhythm, normal heart sounds and intact distal pulses. Exam reveals no gallop and no friction rub.   No murmur heard.  Pulmonary/Chest: Effort normal and breath sounds normal. No respiratory distress. She has no wheezes. She exhibits no tenderness.   Abdominal: Soft. Bowel sounds are normal. Non-tender. There is no guarding.   Musculoskeletal: She exhibits tenderness (flank and back). She exhibits no edema.   Lymphadenopathy:     She has no cervical adenopathy.   Neurological: She is oriented to person, place, and time. No sensory deficit.   BL UE strength 5/5  BL LE strength 4/5   Skin: Skin is warm. No rash noted.   Nursing note and vitals reviewed.      Consults:   Consults (From admission, onward)        Status Ordering Provider     Inpatient consult to Critical Care Medicine  Once     Provider:  (Not yet assigned)    Completed ALFONSO ADKINS     Inpatient consult to Nephrology  Once     Provider:  (Not yet assigned)    Completed ANJU BRITO     Inpatient consult to Neurosurgery  Once     Provider:  (Not yet assigned)    Completed MAGGIE BRIZUELA     Inpatient consult to Physical Medicine Rehab  Once     Provider:  (Not yet assigned)    Completed FRANCISCA DOUGLAS     Inpatient consult to Physical Medicine Rehab  Once     Provider:  (Not yet assigned)    Completed FRANCISCA DOUGLAS     Inpatient consult to PICC team (\[facility] [place] [facility]\"")  Once     Provider:  (Not yet assigned)    Completed ANJU BRITO     Inpatient consult to [facility]  Once     Provider:  (Not yet assigned)    Acknowledged FRANCISCA DOUGLAS          * ALONA  "(acute tubular necrosis)  See CKD      Closed compression fracture of L2 lumbar vertebra  60 yo female s/p mechanical fall with superior L2 compression fracture.   Small bilateral EDH on MRI adjacent to pedicles at L2 with mild thecal sac compression.  L-spine Ct w/ stable superior endplate compression fracture  Per NSGY,    No acute neurosurgical intervention   TLSO brace when out of bed more than 5 minutes or prn for comfort while in bed   Will arrange for pt to follow up in six weeks in outpatient clinic with interval plain films to confirm healing fracture   Cleared for discharge per neurosurgical perspective upon completion of baseline plain films  Previous DEXA w/ osteopenia; initiating Ca and Vit supplements    Due to persistent pain, on Oxy-IR 5 mg Q6 hrs for severe pain, tramadol PRN for moderate.   Scheduled tylenol and methocarbamol  Lidocaine patch  Discharge to in-pt rehab    Acute urinary retention  60 yo female s/p mechanical fall with superior L2 compression fracture.  After presentation to ED, c/o of urinary retnetion w/ bladder scan significant for >1L retained urine and UOP w/ mera approx 1200cc.  Pt denies previous issue with urinary of bowel incontinence or retention.   Potential etiologies of acute retention include spinal compression, pain, pain medication received upon admission, UTI.   Per NSGY, "degree of canal compression not typical for development of acute severe polyradiculopathy."    UTI negative for infection.   Renal U/S w/o acute process  Oxy is available for pain control but try to use non-narcotics  Neuro-checks q4 hours  Voiding trial 5/28 w/ successful UOP    Resolved    Impaired mobility and ADLs  PT/OT  TLSO brace with OOB >5 minutes  Encourage mobility, OOB in chair, and early ambulation as appropriate  In-pt rehab upon d/c      PNA (pneumonia)  See Fever      Acute respiratory failure with hypoxia and hypercapnia  RR called on Pt 5/26 in setting of AMS w/ increased lethargy " and concern for airway protection.   Pt with hx of brain bleed, ESRD, fevers. Pt received 30mg oxycodone in 9 hours. GCS initially 4, improved to 14 after narcan administration and jaw thrust. Pt requiring repeat PRN doses of narcan.      -etiology likely combo of C02 retention in setting of brace and pain inhibiting full respirations and pain medication administration in setting renal injury    -contact MICU if not requiring more doses of narcan, will consider narcan drip if needed.   -low threshold to admit to MICU if somnolence and narcan necessity continues.  -d/c narcotics   AMS resolved s/p narcan during Rapid Response and BiPAPqhs, continue to monitor closely and continue BiPAP with backup rate to ensure pt does not become apneic. Goal sats 88-92%.     Altered mental status  RR called on Pt 5/26 in setting of AMS w/ increased lethargy and concern for airway protection.   Pt with hx of brain bleed, ESRD, fevers. Pt received 30mg oxycodone in 9 hours. GCS initially 4, improved to 14 after narcan administration and jaw thrust. Pt requiring repeat PRN doses of narcan.      -etiology likely combo of C02 retention in setting of brace and pain inhibiting full respirations and pain medication administration in setting renal injury    -contact MICU if not requiring more doses of narcan, will consider narcan drip if needed.   -low threshold to admit to MICU if somnolence and narcan necessity continues.  -d/c narcotics   AMS resolved s/p narcan following Rapid Response and BiPAP qhs    Fever  Febrile intermittently after admission  WCC peaked 15.71, now wnl after BSABx  Treating for potential PNA in setting of acute hypoxic/hypercapnic resp failure  Considerations include inflammatory reaction in setting of fracture as well   CXR prior to RR negative for acute process, UA w/o infection, blood cx pending but low suspicion for +  Abx started following rapid response during which there was concern for hypotension, which  was likely due to cuff misplacement  However, due to ongoing fevers w/o source, covered broadly with renally dosed vanc and cefepime  Narrowed to ceftriaxone to complete 5 total day course for CAP; end date 5/30    Hypokalemia  Admit K 3.1  Likely due to diuretic use  Repleted    GERD (gastroesophageal reflux disease)  - Protonix 40 mg daily      Factor XI deficiency  Initially holding lovenox and SCDs for DVT ppx  However, in setting of ongoing fevers and respiratory decompensation 5/27 concern for embolus  BL LE U/S negative  D-dimer 1.24, unable to pursue CTA and V/Q scan at this time due to ARUNA and Pt status respectively   Stated heparin 7500U q8 hours in hospital      Chronic diastolic heart failure  Pt euvolemic on exam.   No c/o of SOB, CP, palpitations.   Holding home lasix 40mg BID in setting of potential ARUNA.   See CKD    CKD stage G3b/A3, GFR 30-44 and albumin creatinine ratio >300 mg/g  Previous extensive CKD workup indicates that HTN and DM2 etiologies of CKD (JB, cryoglobulins, SPEP/UPEP, ANCA's, and complement all wnl's)  Baseline creatinine 1.6/1.7. Elevation during admission. Peak 3.4.  - Strict I/Os and chart  - MAP > 65  - Hb > 7 g/dL  - Avoid nephrotoxic medications, NSAIDs, IV contrast, etc    Cr continues to improve; 2.0->3.4->2.8->2.6->2.4  Voiding trial successful; producing urine  Urine sediment by MD 5/27/2019: some muddy brown casts, abundant WBCs, abundant hyaline casts-> suggest ATN  Holding lasix and lisinopril until out-pt follow up and resolution of ARUNA  BMP in one week after discharge to assess ARUNA on CKD resolution     Hyperlipidemia  Continue home statin  Previous CVA hx, no residual side effects      HTN (hypertension)  On arrival Pt w/ BP 170s/80s. Per Pt, baseline on home medication closer to SBPs 130s-140s.  Home medications include: amlodipine 10mg qd, coreg 25mg bid, lisinopril 20mg qd. Also hydralazine 25mg for SBP>160.  Present values likely due to acute fracture pain.  Will optimize pain regimen.   Continue home medications      Type 2 diabetes mellitus with stage 3 chronic kidney disease, without long-term current use of insulin  -low dose SSI, not taking Insulin at home and most recent A1c at goal  -diabetic diet      Postoperative hypothyroidism  Continue home synthroid        Final Active Diagnoses:    Diagnosis Date Noted POA    PRINCIPAL PROBLEM:  ATN (acute tubular necrosis) [N17.0] 02/08/2019 Yes    Closed compression fracture of L2 lumbar vertebra [S32.020A] 05/24/2019 Yes    Acute urinary retention [R33.8] 05/24/2019 Yes    Impaired mobility and ADLs [Z74.09] 05/29/2019 No    Acute renal failure with specified lesion [N17.8]  Yes    PNA (pneumonia) [J18.9] 05/28/2019 Yes    Slow transit constipation [K59.01] 05/27/2019 Yes    Altered mental status [R41.82] 05/26/2019 No    Acute respiratory failure with hypoxia and hypercapnia [J96.01, J96.02] 05/26/2019 No    Fever [R50.9] 05/25/2019 Yes    GERD (gastroesophageal reflux disease) [K21.9] 05/24/2019 Yes    Hypokalemia [E87.6] 05/24/2019 Yes    Chronic diastolic heart failure [I50.32] 02/07/2019 Yes    CKD stage G3b/A3, GFR 30-44 and albumin creatinine ratio >300 mg/g [N18.3] 05/16/2018 Yes    HTN (hypertension) [I10] 04/09/2018 Yes    Hyperlipidemia [E78.5] 04/09/2018 Yes    Type 2 diabetes mellitus with stage 3 chronic kidney disease, without long-term current use of insulin [E11.22, N18.3] 04/09/2018 Yes    Postoperative hypothyroidism [E89.0] 04/09/2018 Yes    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] 01[birth year]/1978 Yes      Problems Resolved During this Admission:       Discharged Condition: stable    Disposition: Rehab Facility    Follow Up:  Follow-up Information     Elan Price MD.    Specialties:  Internal Medicine, Hospitalist  Contact information:  [address] [facility] [address]  [phone]                 Patient Instructions:      BASIC METABOLIC PANEL   Standing Status: Future Standing Exp.  Date: 07/28/20     Ambulatory referral to Orthopedics   Referral Priority: Routine Referral Type: Consultation   Requested Specialty: Orthopedic Surgery   Number of Visits Requested: 1     Diet diabetic     Notify your health care provider if you experience any of the following:  temperature >100.4     Notify your health care provider if you experience any of the following:  severe uncontrolled pain     Notify your health care provider if you experience any of the following:  difficulty breathing or increased cough     Notify your health care provider if you experience any of the following:  persistent dizziness, light-headedness, or visual disturbances     Notify your health care provider if you experience any of the following:  increased confusion or weakness     Activity as tolerated       Significant Diagnostic Studies: Labs:   CMP   Recent Labs   Lab 05/29/19  0420 05/30/19  0452    136   K 4.8 4.6    105   CO2 25 22*   * 98   BUN 41* 37*   CREATININE 2.6* 2.4*   CALCIUM 9.4 9.2   ANIONGAP 9 9   ESTGFRAFRICA 22.8* 25.1*   EGFRNONAA 19.7* 21.8*    and CBC   Recent Labs   Lab 05/29/19  0420   WBC 11.17   HGB 11.1*   HCT 33.7*          Pending Diagnostic Studies:     None         Medications:  Reconciled Home Medications:      Medication List      START taking these medications    acetaminophen 500 MG tablet  Commonly known as:  TYLENOL  Take 2 tablets (1,000 mg total) by mouth every 8 (eight) hours.     lidocaine 5 %  Commonly known as:  LIDODERM  Place 1 patch onto the skin once daily. Apply to low back. Remove & Discard patch within 12 hours or as directed by MD     lisinopril 40 MG tablet  Commonly known as:  PRINIVIL,ZESTRIL  Take 0.5 tablets (20 mg total) by mouth once daily. HOLD THIS MED UNTIL MD RE-ASSESSES KIDNEY FUNCTION     methocarbamol 500 MG Tab  Commonly known as:  ROBAXIN  Take 1 tablet (500 mg total) by mouth 4 (four) times daily.     oxyCODONE 5 MG immediate release  "tablet  Commonly known as:  ROXICODONE  Take 1 tablet (5 mg total) by mouth every 6 (six) hours as needed (for severe pain (pain scale 7-10)).     polyethylene glycol 17 gram Pwpk  Commonly known as:  GLYCOLAX  Take 17 g by mouth 2 (two) times daily.     senna-docusate 8.6-50 mg 8.6-50 mg per tablet  Commonly known as:  PERICOLACE  Take 2 tablets by mouth 2 (two) times daily.     traMADol 50 mg tablet  Commonly known as:  ULTRAM  Take 1 tablet (50 mg total) by mouth 2 (two) times daily as needed (for moderate pain (pain scale 4-6)).        CHANGE how you take these medications    atorvastatin 40 MG tablet  Commonly known as:  LIPITOR  Take 1 tablet (40 mg total) by mouth every evening.  What changed:    · medication strength  · how much to take     furosemide 40 MG tablet  Commonly known as:  LASIX  Take 1 tablet (40 mg total) by mouth 2 (two) times daily. HOLD THIS MED UNTIL MD RE-ASSESSES KIDNEY FUNCTION  What changed:  additional instructions     SYNTHROID 200 MCG tablet  Generic drug:  levothyroxine  Take 1 tablet (200 mcg total) by mouth before breakfast. BRAND NAME ONLY  What changed:  when to take this        CONTINUE taking these medications    amLODIPine 10 MG tablet  Commonly known as:  NORVASC  Take 1 tablet (10 mg total) by mouth once daily.     aspirin 81 MG EC tablet  Commonly known as:  ECOTRIN  Take 1 tablet (81 mg total) by mouth once daily.     blood sugar diagnostic Strp  To check BG 1 times daily, to use with insurance preferred meter     carvedilol 25 MG tablet  Commonly known as:  COREG  Take 25 mg by mouth 2 (two) times daily with meals.     escitalopram oxalate 10 MG tablet  Commonly known as:  LEXAPRO  Take 10 mg by mouth once daily.     lancets Misc  To check BG 1 times daily, to use with insurance preferred meter     pantoprazole 40 MG tablet  Commonly known as:  PROTONIX  Take 1 tablet (40 mg total) by mouth once daily.     pen needle, diabetic 32 gauge x 5/32" Ndle  Commonly known as:  " BD ULTRA-FINE OLGA LIDIA PEN NEEDLE  To use with Lantus solostar insulin pens     traZODone 50 MG tablet  Commonly known as:  DESYREL  Take 50 mg by mouth daily as needed.        STOP taking these medications    gabapentin 100 MG capsule  Commonly known as:  NEURONTIN     hydrALAZINE 25 MG tablet  Commonly known as:  APRESOLINE     naproxen 500 MG tablet  Commonly known as:  NAPROSYN            Indwelling Lines/Drains at time of discharge:   Lines/Drains/Airways     Drain            Female External Urinary Catheter 05/30/19 0730 less than 1 day                Time spent on the discharge of patient: 40 minutes  Patient was seen and examined on the date of discharge and determined to be suitable for discharge.         Mita Ceron MD   IM, PGY-1  Department of Hospital Medicine  [facility]

## 2019-05-30 NOTE — PLAN OF CARE
Problem: Physical Therapy Goal  Goal: Physical Therapy Goal  Goals to be met by: 19    Patient will increase functional independence with mobility by performin. Supine to sit with Stand-by Assistance-not met  2. Rolling to Left and Right with Stand-by Assistance using log rolling technique -not met  3. Sit to stand transfer with Stand-by Assistance-not met  4. Bed to chair transfer with Stand-by Assistance using Rolling Walker -not met  5. Gait x25 feet with Stand-by Assistance using Rolling Walker-met   6. Sitting at edge of bed x20 minutes with Stand-by Assistance -not met  7.pt receive gait training ~ 150 ft with RW and CGA- not met     Goals updated and remain appropriate. Lidya Velarde, PT 2019

## 2019-05-30 NOTE — ASSESSMENT & PLAN NOTE
Initially holding lovenox and SCDs for DVT ppx  However, in setting of ongoing fevers and respiratory decompensation 5/27 concern for embolus  BL LE U/S negative  D-dimer 1.24, unable to pursue CTA and V/Q scan at this time due to ARUNA and Pt status respectively   Stated heparin 7500U q8 hours in hospital

## 2019-05-30 NOTE — ASSESSMENT & PLAN NOTE
RR called on Pt 5/26 in setting of AMS w/ increased lethargy and concern for airway protection.   Pt with hx of brain bleed, ESRD, fevers. Pt received 30mg oxycodone in 9 hours. GCS initially 4, improved to 14 after narcan administration and jaw thrust. Pt requiring repeat PRN doses of narcan.      -etiology likely combo of C02 retention in setting of brace and pain inhibiting full respirations and pain medication administration in setting renal injury    -contact MICU if not requiring more doses of narcan, will consider narcan drip if needed.   -low threshold to admit to MICU if somnolence and narcan necessity continues.  -d/c narcotics   AMS resolved s/p narcan following Rapid Response and BiPAP qhs

## 2019-05-30 NOTE — ASSESSMENT & PLAN NOTE
On arrival Pt w/ BP 170s/80s. Per Pt, baseline on home medication closer to SBPs 130s-140s.  Home medications include: amlodipine 10mg qd, coreg 25mg bid, lisinopril 20mg qd. Also hydralazine 25mg for SBP>160.  Present values likely due to acute fracture pain. Will optimize pain regimen.   Continue home medications

## 2019-05-30 NOTE — NURSING TRANSFER
"  Nursing Transfer Note      5/30/2019     Transfer To: [facility]    Transfer via wheelchair    Transfer with transport personnel for wheelchair van    Medicines sent: NA    Chart send with patient: No    Notified: daughter by patient    Patient left with all belongings, including cell phone and .  TSOL brace applied to patient prior to transfer. Patient reported, "I'm not ready to leave.  I just want to go home."    Two visitors arrived just as transport arrived.  They were given instructions on how to go to [facility].      "

## 2019-05-30 NOTE — PLAN OF CARE
Future Appointments   Date Time Provider Department Center   7/8/2019 11:20 AM Tomasa Dobson PA-C [facility]   8/12/2019  8:00 AM [facility]I [facility]US[facility]   8/12/2019  9:10 AM LAB, APPOINTMENT [facility]        05/30/19 1554   Final Note   Assessment Type Final Discharge Note   Anticipated Discharge Disposition Rehab   What phone number can be called within the next 1-3 days to see how you are doing after discharge? [phone]   Hospital Follow Up  Appt(s) scheduled? Yes   Right Care Referral Info   Post Acute Recommendation SNF / Sub-Acute Rehab   Facility Name [facility]

## 2019-05-30 NOTE — ASSESSMENT & PLAN NOTE
Previous extensive CKD workup indicates that HTN and DM2 etiologies of CKD (JB, cryoglobulins, SPEP/UPEP, ANCA's, and complement all wnl's)  Baseline creatinine 1.6/1.7. Elevation during admission. Peak 3.4.  - Strict I/Os and chart  - MAP > 65  - Hb > 7 g/dL  - Avoid nephrotoxic medications, NSAIDs, IV contrast, etc    Cr continues to improve; 2.0->3.4->2.8->2.6->2.4  Voiding trial successful; producing urine  Urine sediment by MD 5/27/2019: some muddy brown casts, abundant WBCs, abundant hyaline casts-> suggest ATN  Holding lasix and lisinopril until out-pt follow up and resolution of ARUNA  BMP in one week after discharge to assess ARUNA on CKD resolution

## 2019-05-30 NOTE — PROGRESS NOTES
Per night nurse and patient, patient had several bowel movements overnight as an outcome of lactulose administration. 1 BM on day shift so far.

## 2019-05-30 NOTE — PT/OT/SLP PROGRESS
Physical Therapy Treatment    Patient Name:  Kathie Quezada   MRN:  [id number]    Recommendations:     Discharge Recommendations:  nursing facility, skilled   Discharge Equipment Recommendations: (will determine DME needs closer to discharge)   Barriers to discharge: Decreased caregiver support family will not be able to assist pt at current functional level.     Assessment:     Kathie Quezada is a 59 y.o. female admitted with a medical diagnosis of ATN (acute tubular necrosis).  She presents with the following impairments/functional limitations:  weakness, gait instability, impaired balance, impaired endurance, decreased lower extremity function, impaired functional mobilty, pain pt sherrill treatment better being able to gait train farther. Pt will benefit from cont skilled PT 5x/wk to progress physically. Pt will need SNF placement to maximize rehab potential. Pt is s/p L2 fx. .    Rehab Prognosis: Good; patient would benefit from acute skilled PT services to address these deficits and reach maximum level of function.    Recent Surgery: * No surgery found *      Plan:     During this hospitalization, patient to be seen 5 x/week to address the identified rehab impairments via gait training, therapeutic activities, therapeutic exercises and progress toward the following goals:    · Plan of Care Expires:  06/27/19    Subjective     Chief Complaint: pt c/o pain and dizziness with treatment.   Patient/Family Comments/goals:  To get better and go home.   Pain/Comfort:  · Pain Rating 1: 8/10  · Location 1: (back)  · Pain Rating Post-Intervention 1: 8/10      Objective:     Communicated with nurse prior to session.  Patient found supine with telemetry, SCD, PureWick, pulse ox (continuous)(hep lock IV) upon PT entry to room.     General Precautions: Standard, fall   Orthopedic Precautions:spinal precautions   Braces: TLSO     Functional Mobility:  · Bed Mobility:  Pt needed verbal cues for hand placement and  sequencing for functional mobility.    · Rolling Right: minimum assistance  · Supine to Sit: minimum assistance  ·   · Transfers:     · Sit to Stand:  moderate assistance with rolling walker. Pt stood x 3 reps.   ·   · Gait: pt received gait training ~ 28 ft with RW and CGA. pt was total assist don TLSO and was additional assist of 1 to follow with chair.      AM-PAC 6 CLICK MOBILITY  Turning over in bed (including adjusting bedclothes, sheets and blankets)?: 3  Sitting down on and standing up from a chair with arms (e.g., wheelchair, bedside commode, etc.): 2  Moving from lying on back to sitting on the side of the bed?: 3  Moving to and from a bed to a chair (including a wheelchair)?: 3  Need to walk in hospital room?: 3  Climbing 3-5 steps with a railing?: 1  Basic Mobility Total Score: 15         Patient left up in chair with all lines intact and call button in reach..    GOALS:   Multidisciplinary Problems     Physical Therapy Goals        Problem: Physical Therapy Goal    Goal Priority Disciplines Outcome Goal Variances Interventions   Physical Therapy Goal     PT, PT/OT Ongoing (interventions implemented as appropriate)     Description:  Goals to be met by: 19    Patient will increase functional independence with mobility by performin. Supine to sit with Stand-by Assistance-not met  2. Rolling to Left and Right with Stand-by Assistance using log rolling technique -not met  3. Sit to stand transfer with Stand-by Assistance-not met  4. Bed to chair transfer with Stand-by Assistance using Rolling Walker -not met  5. Gait x25 feet with Stand-by Assistance using Rolling Walker-met   6. Sitting at edge of bed x20 minutes with Stand-by Assistance -not met  7.pt receive gait training ~ 150 ft with RW and CGA- not met                       Time Tracking:     PT Received On: 19  PT Start Time: 1004     PT Stop Time: 1033  PT Total Time (min): 29 min     Billable Minutes: Gait Training 14 min and  Therapeutic Activity 15 min    Treatment Type: Treatment  PT/PTA: PT     PTA Visit Number: 0     Lidya Velarde, PT  05/30/2019

## 2019-05-30 NOTE — ASSESSMENT & PLAN NOTE
Pt euvolemic on exam.   No c/o of SOB, CP, palpitations.   Holding home lasix 40mg BID in setting of potential ARUNA.   See CKD

## 2019-05-30 NOTE — SUBJECTIVE & OBJECTIVE
Interval History 5/30/2019:  Patient is seen for follow-up rehab evaluation and recommendations: Pain better controlled.  Improved with therapy yesterday.  BM overnight.      HPI, Past Medical, Family, and Social History remains the same as documented in the initial encounter.    Scheduled Medications:    acetaminophen  1,000 mg Oral Q8H    amLODIPine  10 mg Oral Daily    aspirin  81 mg Oral Daily    atorvastatin  10 mg Oral Daily    carvedilol  25 mg Oral BID WM    escitalopram oxalate  10 mg Oral Daily    heparin (porcine)  7,500 Units Subcutaneous Q8H    lactulose  200 g Rectal TID    levothyroxine  200 mcg Oral Before breakfast    lidocaine  1 patch Transdermal Q24H    methocarbamol  500 mg Oral QID    pantoprazole  40 mg Oral Daily    polyethylene glycol  17 g Oral BID    senna-docusate 8.6-50 mg  1 tablet Oral BID    vitamin D  1,000 Units Oral Daily     PRN Medications: dextrose 50%, dextrose 50%, diphenhydrAMINE, glucagon (human recombinant), glucose, glucose, insulin aspart U-100, naloxone, ondansetron, oxyCODONE, senna-docusate 8.6-50 mg, sodium chloride 0.9%, sodium chloride 0.9%, traMADol    Review of Systems   Constitutional: Positive for activity change. Negative for chills and fever.   HENT: Negative for drooling, hearing loss, trouble swallowing and voice change.    Eyes: Negative for pain and visual disturbance.   Respiratory: Negative for cough, shortness of breath and wheezing.    Cardiovascular: Negative for chest pain and palpitations.   Gastrointestinal: Negative for abdominal distention, constipation, nausea and vomiting.   Genitourinary: Negative for difficulty urinating and flank pain.   Musculoskeletal: Positive for arthralgias, back pain and gait problem. Negative for neck pain.   Skin: Negative for rash and wound.   Neurological: Positive for weakness and numbness. Negative for dizziness and headaches.   Psychiatric/Behavioral: Negative for agitation and hallucinations. The  patient is not nervous/anxious.      Objective:     Vital Signs (Most Recent):  Temp: 99 °F (37.2 °C) (05/30/19 0725)  Pulse: 67 (05/30/19 0725)  Resp: 18 (05/30/19 0725)  BP: (!) 150/92 (05/30/19 0725)  SpO2: 98 % (05/30/19 0924)    Vital Signs (24h Range):  Temp:  [97.7 °F (36.5 °C)-99.4 °F (37.4 °C)] 99 °F (37.2 °C)  Pulse:  [65-72] 67  Resp:  [16-20] 18  SpO2:  [91 %-98 %] 98 %  BP: (103-150)/(54-92) 150/92     Physical Exam   Constitutional: She is oriented to person, place, and time. She appears well-developed and well-nourished. No distress.   Obese   HENT:   Head: Normocephalic and atraumatic.   Right Ear: External ear normal.   Left Ear: External ear normal.   Nose: Nose normal.   Eyes: Right eye exhibits no discharge. Left eye exhibits no discharge. No scleral icterus.   Neck: Normal range of motion.   Cardiovascular: Normal rate and intact distal pulses.   Pulmonary/Chest: Effort normal. No respiratory distress. She has no wheezes.   Abdominal: Soft. She exhibits no distension. There is no tenderness.   Musculoskeletal: She exhibits tenderness. She exhibits no edema.        Lumbar back: She exhibits decreased range of motion.   Neurological: She is alert and oriented to person, place, and time. She has normal strength. She exhibits normal muscle tone.   Skin: Skin is warm and dry. No rash noted.   Psychiatric: She has a normal mood and affect. Her behavior is normal.   Vitals reviewed.    Diagnostic Results:   Labs: Reviewed  X-Ray: Reviewed  US: Reviewed  CT: Reviewed  MRI: Reviewed    NEUROLOGICAL EXAMINATION:     MENTAL STATUS   Oriented to person, place, and time.     MOTOR EXAM     Strength   Strength 5/5 throughout.

## 2019-05-30 NOTE — ASSESSMENT & PLAN NOTE
-  (I) with ADLs and mobility at baseline  -  Pain limited  Recommendations  -  Encourage mobility, OOB in chair, and early ambulation as appropriate  -  PT/OT evaluate and treat  -  DVT prophylaxis  -  Monitor for and prevent skin breakdown and pressure ulcers  -  Monitor Bowel and bladder-  5/30/19  -  Continue pain management- now on scheduled Robaxin and Tylenol, Lidocaine patch

## 2019-05-31 NOTE — PT/OT/SLP DISCHARGE
Physical Therapy Discharge Summary    Name: Kathie Quezada  MRN: [id number]   Principal Problem: ATN (acute tubular necrosis)     Patient Discharged from acute Physical Therapy on 19.  Please refer to prior PT noted date on 19 for functional status.     Assessment:     Goals partially met.    Objective:     GOALS:   Multidisciplinary Problems     Physical Therapy Goals     Not on file          Multidisciplinary Problems (Resolved)        Problem: Physical Therapy Goal    Goal Priority Disciplines Outcome Goal Variances Interventions   Physical Therapy Goal   (Resolved)     PT, PT/OT Outcome(s) achieved     Description:  Goals to be met by: 19    Patient will increase functional independence with mobility by performin. Supine to sit with Stand-by Assistance-not met  2. Rolling to Left and Right with Stand-by Assistance using log rolling technique -not met  3. Sit to stand transfer with Stand-by Assistance-not met  4. Bed to chair transfer with Stand-by Assistance using Rolling Walker -not met  5. Gait x25 feet with Stand-by Assistance using Rolling Walker-met   6. Sitting at edge of bed x20 minutes with Stand-by Assistance -not met  7.pt receive gait training ~ 150 ft with RW and CGA- not met                       Reasons for Discontinuation of Therapy Services  Transfer to alternate level of care.      Plan:     Patient Discharged to: Inpatient Rehab.    Lidya Velarde, PT  2019

## 2019-06-12 PROCEDURE — G0180 MD CERTIFICATION HHA PATIENT: HCPCS | Mod: ,,, | Performed by: PHYSICAL MEDICINE & REHABILITATION

## 2019-06-12 PROCEDURE — G0180 PR HOME HEALTH MD CERTIFICATION: ICD-10-PCS | Mod: ,,, | Performed by: PHYSICAL MEDICINE & REHABILITATION

## 2019-06-14 RX ORDER — TRAMADOL HYDROCHLORIDE 50 MG/1
50 TABLET ORAL 2 TIMES DAILY PRN
Start: 2019-06-14 | End: 2019-06-28

## 2019-06-14 NOTE — TELEPHONE ENCOUNTER
----- Message from Sumeet Srinivasan sent at 6/14/2019  8:31 AM CDT -----  Contact: H/H Nurse Alka [phone]  RX request - refill or new RX.  Is this a refill or new RX:  Refill  RX name and strength: traMADol (ULTRAM) 50 mg tablet    Pharmacy name and phone #[facility] [id number] - [address] AT [facility] [phone] (Phone)  [phone] (Fax)    Comments: H/H Nurse calling stating recently patient got out of rehab, [facility] [address] from [place] was suppose to call in tramadol, but was never called into pharmacy, patient wants to get off Oxycodone. H/H Nurse Alka [phone]    Please call an advise  Thank you

## 2019-06-18 NOTE — PROGRESS NOTES
"[facility]  Follow-up Visit Progress Note     PRESENTING HISTORY     PCP: Elan Price MD  Chief Complaint/Reason for Visit:  Follow up from recent visit.      No chief complaint on file.      History of Present Illness & ROS: Ms. Kathie Quezada is a 59 y.o. female.  Here for Hospital follow up.   Noted to have been discharged on 5/30/2019 after 6 day admission for Acute Hypercapnic Respiratory Failure in the setting of "accidental overdose of opioid" with undiagnosed SERA. BiPap'd and Narcan on admit. Additionally, ? PNA in the setting of admission with fevers (103), tx'd with broad spectrum abxs x5 days.         Review of Systems:  Eyes: denies visual changes at this time denies floaters   ENT: no nasal congestion or sore throat  Respiratory: no cough or shorness of breath  Cardiovascular: no chest pain or palpitations  Gastrointestinal: no nausea or vomiting, no abdominal pain or change in bowel habits  Genitourinary: no hematuria or dysuria; denies frequency  Hematologic/Lymphatic: no easy bruising or lymphadenopathy  Musculoskeletal: no arthralgias or myalgias  Neurological: no seizures or tremors  Endocrine: no heat or cold intolerance      PAST HISTORY:     Past Medical History:   Diagnosis Date    CHF (congestive heart failure)     CKD stage G3a/A3, GFR 45-59 and albumin creatinine ratio >300 mg/g 5/16/2018    CVA (cerebral vascular accident) 2017    Essential hypertension 4/9/2018    Factor XI deficiency 1978    GIB (gastrointestinal bleeding) 10/18/2018    History of hepatitis C     s/p succesful Rx w/ Harvoni by Dr Herbert  - SVR12 (cure) 2016    Hyperlipidemia     Mixed hyperlipidemia 4/9/2018    Postoperative hypothyroidism     Proteinuria 4/9/2018    Type 2 diabetes mellitus with circulatory disorder, with long-term current use of insulin 4/9/2018       Past Surgical History:   Procedure Laterality Date    BACK SURGERY      COLONOSCOPY  11/13/2015    Dr Chino torres " prep. internal hemorrhoids. diverticulosis of sigmois. NO polyps. repeat due 2025    galbladder      HYSTERECTOMY      OOPHORECTOMY      one    TONSILLECTOMY         Family History   Problem Relation Age of Onset    Diabetes type II Mother        Social History     Socioeconomic History    Marital status:      Spouse name: Not on file    Number of children: Not on file    Years of education: Not on file    Highest education level: Not on file   Occupational History    Not on file   Social Needs    Financial resource strain: Not on file    Food insecurity:     Worry: Not on file     Inability: Not on file    Transportation needs:     Medical: Not on file     Non-medical: Not on file   Tobacco Use    Smoking status: Never Smoker    Smokeless tobacco: Never Used   Substance and Sexual Activity    Alcohol use: Yes     Alcohol/week: 2.4 oz     Types: 4 Cans of beer per week     Comment: weekly    Drug use: No    Sexual activity: Not on file   Lifestyle    Physical activity:     Days per week: Not on file     Minutes per session: Not on file    Stress: Not on file   Relationships    Social connections:     Talks on phone: Not on file     Gets together: Not on file     Attends [employer] service: Not on file     Active member of club or organization: Not on file     Attends meetings of clubs or organizations: Not on file     Relationship status: Not on file   Other Topics Concern    Not on file   Social History Narrative    Not on file       MEDICATIONS & ALLERGIES:     Current Outpatient Medications on File Prior to Visit   Medication Sig Dispense Refill    acetaminophen (TYLENOL) 500 MG tablet Take 2 tablets (1,000 mg total) by mouth every 8 (eight) hours.  0    amLODIPine (NORVASC) 10 MG tablet Take 1 tablet (10 mg total) by mouth once daily. 30 tablet 11    aspirin (ECOTRIN) 81 MG EC tablet Take 1 tablet (81 mg total) by mouth once daily. 30 tablet 0    atorvastatin (LIPITOR) 40 MG  "tablet Take 1 tablet (40 mg total) by mouth every evening.      blood sugar diagnostic Strp To check BG 1 times daily, to use with insurance preferred meter 35 strip 11    carvedilol (COREG) 25 MG tablet Take 25 mg by mouth 2 (two) times daily with meals.       escitalopram oxalate (LEXAPRO) 10 MG tablet Take 10 mg by mouth once daily.      furosemide (LASIX) 40 MG tablet Take 1 tablet (40 mg total) by mouth 2 (two) times daily. HOLD THIS MED UNTIL MD RE-ASSESSES KIDNEY FUNCTION 60 tablet 0    lancets Misc To check BG 1 times daily, to use with insurance preferred meter 50 each 11    lidocaine (LIDODERM) 5 % Place 1 patch onto the skin once daily. Apply to low back. Remove & Discard patch within 12 hours or as directed by MD  0    lisinopril (PRINIVIL,ZESTRIL) 40 MG tablet Take 0.5 tablets (20 mg total) by mouth once daily. HOLD THIS MED UNTIL MD RE-ASSESSES KIDNEY FUNCTION      methocarbamol (ROBAXIN) 500 MG Tab Take 1 tablet (500 mg total) by mouth 4 (four) times daily. 124 tablet 0    pantoprazole (PROTONIX) 40 MG tablet Take 1 tablet (40 mg total) by mouth once daily. 90 tablet 3    pen needle, diabetic (BD ULTRA-FINE OLGA LIDIA PEN NEEDLE) 32 gauge x 5/32" Ndle To use with Lantus solostar insulin pens 100 each 3    polyethylene glycol (GLYCOLAX) 17 gram PwPk Take 17 g by mouth 2 (two) times daily.  0    senna-docusate 8.6-50 mg (PERICOLACE) 8.6-50 mg per tablet Take 2 tablets by mouth 2 (two) times daily.      SYNTHROID 200 mcg tablet Take 1 tablet (200 mcg total) by mouth before breakfast. BRAND NAME ONLY 30 tablet 2    traZODone (DESYREL) 50 MG tablet Take 1 tablet (50 mg total) by mouth nightly as needed for Insomnia.  0     No current facility-administered medications on file prior to visit.         Review of patient's allergies indicates:   Allergen Reactions    Bactrim [sulfamethoxazole-trimethoprim] Hives    Codeine Hives    Gabapentin      Pruritis     Penicillins Hives    Shellfish " containing products Itching    Sulfa (sulfonamide antibiotics) Hives       Medications Reconciliation:   I have reconciled the patient's home medications and discharge medications with the patient/family. I have updated all changes.  Refer to After-Visit Medication List.    OBJECTIVE:     Vital Signs:  There were no vitals filed for this visit.  Wt Readings from Last 1 Encounters:   05/23/19 1903 113.4 kg (250 lb)     There is no height or weight on file to calculate BMI.     Wt Readings from Last 3 Encounters:   06/25/19 107.9 kg (237 lb 14 oz)   05/23/19 113.4 kg (250 lb)   02/21/19 113 kg (249 lb 1.9 oz)     Temp Readings from Last 3 Encounters:   05/30/19 96.7 °F (35.9 °C)   02/08/19 98.4 °F (36.9 °C) (Axillary)   01/17/19 98 °F (36.7 °C)     BP Readings from Last 3 Encounters:   06/25/19 114/70   05/30/19 (!) 147/67   02/21/19 120/80     Pulse Readings from Last 3 Encounters:   06/25/19 72   05/30/19 70   02/21/19 73         Physical Exam:  General: Well developed, well nourished. No distress.  HEENT: Head is normocephalic, atraumatic; ears are normal.   Eyes: Clear conjunctiva.  Neck: Supple, symmetrical neck; trachea midline.  Lungs: Clear to auscultation bilaterally and normal respiratory effort.  Cardiovascular: Heart with regular rate and rhythm. No murmurs, gallops or rubs  Extremities: No LE edema. Pulses 2+ and symmetric.   Abdomen: Abdomen is soft, non-tender non-distended with normal bowel sounds.  Skin: Skin color, texture, turgor normal. No rashes.  Musculoskeletal: Normal gait.   Lymph Nodes: No cervical or supraclavicular adenopathy.  Neurologic: Normal strength and tone. No focal numbness or weakness.   Psychiatric: Not depressed.        Laboratory  Lab Results   Component Value Date    WBC 11.17 05/29/2019    HGB 11.1 (L) 05/29/2019    HCT 33.7 (L) 05/29/2019     05/29/2019    CHOL 213 (H) 10/18/2018    TRIG 109 10/18/2018    HDL 76 (H) 10/18/2018    ALT 10 05/28/2019    AST 10  "05/28/2019     05/30/2019    K 4.6 05/30/2019     05/30/2019    CREATININE 2.4 (H) 05/30/2019    BUN 37 (H) 05/30/2019    CO2 22 (L) 05/30/2019    TSH 0.262 (L) 05/26/2019    INR 1.1 05/24/2019    HGBA1C 4.8 05/25/2019       ASSESSMENT & PLAN:     HIGH RISK CONDITION(S):      Recent admission for Acute Hypercapnic Respiratory Failure during most recent admission in the setting of undiagnosed SERA, and accidental OD of Opioids for control of pain in relation to an Acute Lumbar compression fracture (L2) being managed by NS:   *TSLO Brace in place  *Seen and followed by PMR and NS during admission  ` continue Tylenol  *She is still with some acute pain in relation: noted the order for Robaxin, but the discharging hospital team did not "print" or electronically submit to her pharmacy; filled today and advised to take as needed, but not at the same time as the Oxy.   ` follow up with NS in 6 weeks (apt: 7/8)  *Needs to be eval'd for suspected Sleep Apnea given the most recent event during admission and her continued use of sedative medications.   ` Amb refer to Sleep Disorder clinic for suspected Sleep Apnea. (apt: 7/17)      Acute pain in relation to Acute L2 compression fracture:   Taking the Oxy 15 sparingly since most acute fracture; still with pain; pending follow up with NS on 7/8; undergoing PT with [facility] with hopes of repair and not needing to undergo other invasive interventions.   Will dispense an additional 10 days in the interim for her acute pain mgt; she understands that will not be able to refill and will need to follow up with Neurosurg as scheduled. If pain persist, may need to be considered for other alternatives for pain control and mgt (I.e. Kypho / Vertebra-plasty).       Recent admission with an ARUNA in the setting of CKD IV:   Baseline: 2.1-2.3  Admit: 1.7 (5/24)  Discharge: (5/30): 2.4  Continue to hold the ACE and the diuretic; off of these x 2 months due to "renal insufficiency"; " stable without at this time; risk outweighs the benefits at this time.   *Lisinopril 20 (Held on discharge due to ATN)  *Lasix 40/40 (Held on discharge due to ATN on CKD III)  *Needs follow up with Nephrology team; seen in 02/2019 with no follow ups since. Referral has been placed once again. Will repeat the Renal markers today to surveillance stability.         Hypertension:   *Goal:  < 130/90  Today: 114/70 (controlled)  ` continue Norvasc 10  ` continue Coreg 25/25  *Lisinopril 20 (Held on discharge due to ATN)  *Lasix 40/40 (Held on discharge due to ATN on CKD III)  *PRN Hydralazine          DM (II):   Most recent A1c: 4.8% (5/25/2019)  *Not monitoring at home (advised to monitor and if in need of a new machine and supplies to please let Provider know)   *Off Insulin therapy since 02/2019           Hypothyroidism:   Lab Results   Component Value Date    TSH 0.262 (L) 05/26/2019   ` continue current dose of thyroid hormone replacement       History of Hep C:   *Est'd with Hep C Clinic Providers at [facility] with labs and US pending for 8/12/2019      Future Appointments   Date Time Provider Department Center   7/8/2019 11:20 AM Tomasa Dobson PA-C [facility] Cli   7/17/2019 10:40 AM Breanna Jaime NP [facility] SLEEP [facility]   8/12/2019  8:00 AM [facility] OIC-US1 MASTER [facility] ULTR [facility]   8/12/2019  9:10 AM LAB, APPOINTMENT [facility] INTMED [facility] LAB IM Cj [facility]   10/1/2019 10:30 AM Elan Price MD [facility] IM Cj AM[facility]        Medication List           Accurate as of 6/25/19 12:34 PM. If you have any questions, ask your nurse or doctor.               START taking these medications    oxyCODONE 15 MG Tab  Commonly known as:  ROXICODONE  Take 1 tablet (15 mg total) by mouth every 12 (twelve) hours as needed for Pain. Take for severe pain.  Started by:  WHITNEY Jaquez     polyethylene glycol 17 gram/dose powder  Commonly known as:  GLYCOLAX  Take 17 g by mouth once daily.  Replaces:   "polyethylene glycol 17 gram Pwpk  Started by:  WHITNEY Jaquez        CHANGE how you take these medications    methocarbamol 500 MG Tab  Commonly known as:  ROBAXIN  Take 1 tablet (500 mg total) by mouth every 6 (six) hours as needed.  What changed:    · when to take this  · reasons to take this  Changed by:  WHITNEY Jaquez        CONTINUE taking these medications    acetaminophen 500 MG tablet  Commonly known as:  TYLENOL  Take 2 tablets (1,000 mg total) by mouth every 8 (eight) hours.     amLODIPine 10 MG tablet  Commonly known as:  NORVASC  Take 1 tablet (10 mg total) by mouth once daily.     aspirin 81 MG EC tablet  Commonly known as:  ECOTRIN  Take 1 tablet (81 mg total) by mouth once daily.     atorvastatin 40 MG tablet  Commonly known as:  LIPITOR  Take 1 tablet (40 mg total) by mouth every evening.     blood sugar diagnostic Strp  To check BG 1 times daily, to use with insurance preferred meter     carvedilol 25 MG tablet  Commonly known as:  COREG     escitalopram oxalate 10 MG tablet  Commonly known as:  LEXAPRO     furosemide 40 MG tablet  Commonly known as:  LASIX  Take 1 tablet (40 mg total) by mouth 2 (two) times daily. HOLD THIS MED UNTIL MD RE-ASSESSES KIDNEY FUNCTION     lancets Misc  To check BG 1 times daily, to use with insurance preferred meter     lidocaine 5 %  Commonly known as:  LIDODERM  Place 1 patch onto the skin once daily. Apply to low back. Remove & Discard patch within 12 hours or as directed by MD     lisinopril 40 MG tablet  Commonly known as:  PRINIVIL,ZESTRIL  Take 0.5 tablets (20 mg total) by mouth once daily. HOLD THIS MED UNTIL MD RE-ASSESSES KIDNEY FUNCTION     pantoprazole 40 MG tablet  Commonly known as:  PROTONIX  Take 1 tablet (40 mg total) by mouth once daily.     pen needle, diabetic 32 gauge x 5/32" Ndle  Commonly known as:  BD ULTRA-FINE OLGA LIDIA PEN NEEDLE  To use with Lantus solostar insulin pens     SYNTHROID 200 MCG tablet  Generic drug:  " levothyroxine  Take 1 tablet (200 mcg total) by mouth before breakfast. BRAND NAME ONLY     traZODone 50 MG tablet  Commonly known as:  DESYREL  Take 1 tablet (50 mg total) by mouth nightly as needed for Insomnia.     vitamin D 1000 units Tab  Commonly known as:  VITAMIN D3        STOP taking these medications    polyethylene glycol 17 gram Pwpk  Commonly known as:  GLYCOLAX  Replaced by:  polyethylene glycol 17 gram/dose powder  Stopped by:  WHITNEY Jaquez     senna-docusate 8.6-50 mg 8.6-50 mg per tablet  Commonly known as:  PERICOLACE  Stopped by:  WHITNEY Jaquez           Where to Get Your Medications      These medications were sent to [facility] [address] [place], [place] [address] [address]    Phone:  [phone]   · methocarbamol 500 MG Tab  · oxyCODONE 15 MG Tab  · polyethylene glycol 17 gram/dose powder         Signing Physician:  WHITNEY Jaquez

## 2019-06-25 ENCOUNTER — TELEPHONE (OUTPATIENT)
Dept: INTERNAL MEDICINE | Facility: CLINIC | Age: 59
End: 2019-06-25

## 2019-06-25 ENCOUNTER — TELEPHONE (OUTPATIENT)
Dept: NEPHROLOGY | Facility: CLINIC | Age: 59
End: 2019-06-25

## 2019-06-25 ENCOUNTER — OFFICE VISIT (OUTPATIENT)
Dept: INTERNAL MEDICINE | Facility: CLINIC | Age: 59
End: 2019-06-25
Payer: MEDICARE

## 2019-06-25 VITALS
DIASTOLIC BLOOD PRESSURE: 70 MMHG | BODY MASS INDEX: 37.34 KG/M2 | SYSTOLIC BLOOD PRESSURE: 114 MMHG | WEIGHT: 237.88 LBS | HEIGHT: 67 IN | OXYGEN SATURATION: 98 % | HEART RATE: 72 BPM

## 2019-06-25 DIAGNOSIS — I10 ESSENTIAL HYPERTENSION: ICD-10-CM

## 2019-06-25 DIAGNOSIS — E11.22 TYPE 2 DIABETES MELLITUS WITH STAGE 3 CHRONIC KIDNEY DISEASE, WITHOUT LONG-TERM CURRENT USE OF INSULIN: ICD-10-CM

## 2019-06-25 DIAGNOSIS — R52 ACUTE PAIN: ICD-10-CM

## 2019-06-25 DIAGNOSIS — S32.020A CLOSED COMPRESSION FRACTURE OF SECOND LUMBAR VERTEBRA, INITIAL ENCOUNTER: ICD-10-CM

## 2019-06-25 DIAGNOSIS — J96.02 ACUTE RESPIRATORY FAILURE WITH HYPOXIA AND HYPERCAPNIA: Primary | ICD-10-CM

## 2019-06-25 DIAGNOSIS — N18.32 CKD STAGE G3B/A3, GFR 30-44 AND ALBUMIN CREATININE RATIO >300 MG/G: ICD-10-CM

## 2019-06-25 DIAGNOSIS — N17.0 ATN (ACUTE TUBULAR NECROSIS): ICD-10-CM

## 2019-06-25 DIAGNOSIS — G47.30 SLEEP APNEA, UNSPECIFIED TYPE: ICD-10-CM

## 2019-06-25 DIAGNOSIS — N18.30 TYPE 2 DIABETES MELLITUS WITH STAGE 3 CHRONIC KIDNEY DISEASE, WITHOUT LONG-TERM CURRENT USE OF INSULIN: ICD-10-CM

## 2019-06-25 DIAGNOSIS — J96.01 ACUTE RESPIRATORY FAILURE WITH HYPOXIA AND HYPERCAPNIA: Primary | ICD-10-CM

## 2019-06-25 PROCEDURE — 99214 PR OFFICE/OUTPT VISIT, EST, LEVL IV, 30-39 MIN: ICD-10-PCS | Mod: S$PBB,,, | Performed by: NURSE PRACTITIONER

## 2019-06-25 PROCEDURE — 99999 PR PBB SHADOW E&M-EST. PATIENT-LVL V: ICD-10-PCS | Mod: PBBFAC,,, | Performed by: NURSE PRACTITIONER

## 2019-06-25 PROCEDURE — 99999 PR PBB SHADOW E&M-EST. PATIENT-LVL V: CPT | Mod: PBBFAC,,, | Performed by: NURSE PRACTITIONER

## 2019-06-25 PROCEDURE — 99215 OFFICE O/P EST HI 40 MIN: CPT | Mod: PBBFAC | Performed by: NURSE PRACTITIONER

## 2019-06-25 PROCEDURE — 99214 OFFICE O/P EST MOD 30 MIN: CPT | Mod: S$PBB,,, | Performed by: NURSE PRACTITIONER

## 2019-06-25 RX ORDER — METHOCARBAMOL 500 MG/1
500 TABLET, FILM COATED ORAL EVERY 6 HOURS PRN
Qty: 90 TABLET | Refills: 0 | Status: SHIPPED | OUTPATIENT
Start: 2019-06-25 | End: 2019-07-26

## 2019-06-25 RX ORDER — OXYCODONE HYDROCHLORIDE 15 MG/1
TABLET ORAL
Refills: 0 | COMMUNITY
Start: 2019-06-11 | End: 2019-06-25 | Stop reason: SDUPTHER

## 2019-06-25 RX ORDER — POLYETHYLENE GLYCOL 3350 17 G/17G
17 POWDER, FOR SOLUTION ORAL DAILY
Qty: 1 BOTTLE | Refills: 3 | Status: SHIPPED | OUTPATIENT
Start: 2019-06-25 | End: 2019-11-08

## 2019-06-25 RX ORDER — OXYCODONE HYDROCHLORIDE 15 MG/1
15 TABLET ORAL EVERY 12 HOURS PRN
Qty: 10 TABLET | Refills: 0 | Status: SHIPPED | OUTPATIENT
Start: 2019-06-25 | End: 2019-07-08 | Stop reason: SDUPTHER

## 2019-06-25 RX ORDER — CHOLECALCIFEROL (VITAMIN D3) 25 MCG
1000 TABLET ORAL
COMMUNITY
Start: 2019-06-11 | End: 2019-07-11

## 2019-06-25 NOTE — PROGRESS NOTES
Good afternoon, I scheduled all her appointments, that department does not have any appointments available I placed her on the wait list Nephro will call her to schedule     Thank you

## 2019-06-25 NOTE — Clinical Note
Please schedule patient an appt with Nephrology, Dr SOFYA Price and the [facility] (referral placed). Thanks for the help!

## 2019-06-25 NOTE — TELEPHONE ENCOUNTER
BMP  Lab Results   Component Value Date     06/25/2019    K 4.1 06/25/2019     06/25/2019    CO2 24 06/25/2019    BUN 32 (H) 06/25/2019    CREATININE 2.4 (H) 06/25/2019    CALCIUM 9.6 06/25/2019    ANIONGAP 10 06/25/2019    ESTGFRAFRICA 25 (A) 06/25/2019    EGFRNONAA 22 (A) 06/25/2019     Creat stable at 2.4    Off the ACE and Lasix for now.     Referral has been placed again to Nephro; last seen in 2/2019; needs a follow up.  was not able to get her in and she was 'placed on wait list'.   Will call the dept for expediting.     Results have been released to her portal..      SDJ

## 2019-07-04 ENCOUNTER — EXTERNAL HOME HEALTH (OUTPATIENT)
Dept: HOME HEALTH SERVICES | Facility: HOSPITAL | Age: 59
End: 2019-07-04
Payer: MEDICARE

## 2019-07-08 ENCOUNTER — HOSPITAL ENCOUNTER (OUTPATIENT)
Dept: RADIOLOGY | Facility: HOSPITAL | Age: 59
Discharge: HOME | End: 2019-07-08
Attending: PHYSICIAN ASSISTANT
Payer: MEDICARE

## 2019-07-08 ENCOUNTER — OFFICE VISIT (OUTPATIENT)
Dept: NEUROSURGERY | Facility: CLINIC | Age: 59
End: 2019-07-08
Payer: MEDICARE

## 2019-07-08 VITALS
HEIGHT: 67 IN | BODY MASS INDEX: 37.37 KG/M2 | HEART RATE: 77 BPM | WEIGHT: 238.13 LBS | DIASTOLIC BLOOD PRESSURE: 79 MMHG | SYSTOLIC BLOOD PRESSURE: 146 MMHG

## 2019-07-08 DIAGNOSIS — S32.020D CLOSED COMPRESSION FRACTURE OF L2 LUMBAR VERTEBRA WITH ROUTINE HEALING, SUBSEQUENT ENCOUNTER: Primary | ICD-10-CM

## 2019-07-08 DIAGNOSIS — S32.020D CLOSED COMPRESSION FRACTURE OF L2 LUMBAR VERTEBRA WITH ROUTINE HEALING, SUBSEQUENT ENCOUNTER: ICD-10-CM

## 2019-07-08 PROCEDURE — 99999 PR PBB SHADOW E&M-EST. PATIENT-LVL IV: ICD-10-PCS | Mod: PBBFAC,,, | Performed by: PHYSICIAN ASSISTANT

## 2019-07-08 PROCEDURE — 99214 OFFICE O/P EST MOD 30 MIN: CPT | Mod: PBBFAC,25 | Performed by: PHYSICIAN ASSISTANT

## 2019-07-08 PROCEDURE — 99214 OFFICE O/P EST MOD 30 MIN: CPT | Mod: S$PBB,,, | Performed by: PHYSICIAN ASSISTANT

## 2019-07-08 PROCEDURE — 99214 PR OFFICE/OUTPT VISIT, EST, LEVL IV, 30-39 MIN: ICD-10-PCS | Mod: S$PBB,,, | Performed by: PHYSICIAN ASSISTANT

## 2019-07-08 PROCEDURE — 72100 X-RAY EXAM L-S SPINE 2/3 VWS: CPT | Mod: 26,,, | Performed by: RADIOLOGY

## 2019-07-08 PROCEDURE — 99999 PR PBB SHADOW E&M-EST. PATIENT-LVL IV: CPT | Mod: PBBFAC,,, | Performed by: PHYSICIAN ASSISTANT

## 2019-07-08 PROCEDURE — 72100 XR LUMBAR SPINE AP AND LATERAL: ICD-10-PCS | Mod: 26,,, | Performed by: RADIOLOGY

## 2019-07-08 PROCEDURE — 72100 X-RAY EXAM L-S SPINE 2/3 VWS: CPT | Mod: TC,FY

## 2019-07-08 RX ORDER — TRAZODONE HYDROCHLORIDE 50 MG/1
TABLET ORAL
Qty: 30 TABLET | Refills: 0 | OUTPATIENT
Start: 2019-07-08 | End: 2019-07-31 | Stop reason: SDUPTHER

## 2019-07-08 RX ORDER — OXYCODONE HYDROCHLORIDE 15 MG/1
15 TABLET ORAL EVERY 12 HOURS PRN
Qty: 20 TABLET | Refills: 0 | Status: SHIPPED | OUTPATIENT
Start: 2019-07-08 | End: 2019-07-31 | Stop reason: SDUPTHER

## 2019-07-08 RX ORDER — LEVOTHYROXINE SODIUM 200 UG/1
TABLET ORAL
Qty: 30 TABLET | Refills: 0 | OUTPATIENT
Start: 2019-07-08 | End: 2019-07-31 | Stop reason: SDUPTHER

## 2019-07-08 RX ORDER — PANTOPRAZOLE SODIUM 40 MG/1
TABLET, DELAYED RELEASE ORAL
Qty: 30 TABLET | Refills: 0 | OUTPATIENT
Start: 2019-07-08 | End: 2019-07-31 | Stop reason: SDUPTHER

## 2019-07-08 RX ORDER — ERGOCALCIFEROL 1.25 MG/1
50000 CAPSULE ORAL
COMMUNITY
End: 2019-07-31 | Stop reason: SDUPTHER

## 2019-07-08 RX ORDER — TRAMADOL HYDROCHLORIDE 50 MG/1
50 TABLET ORAL 2 TIMES DAILY PRN
COMMUNITY
End: 2019-07-31

## 2019-07-08 RX ORDER — ATORVASTATIN CALCIUM 10 MG/1
TABLET, FILM COATED ORAL
Qty: 30 TABLET | Refills: 0 | OUTPATIENT
Start: 2019-07-08 | End: 2019-07-31

## 2019-07-08 NOTE — PROGRESS NOTES
[facility]  Neurosurgery    SUBJECTIVE:     History of Present Illness:  Kathie Quezada is a 59 y.o. female with CHF, CKD, and h/o CVA who presents for 6 week hospital follow-up after traumatic L2 compression fracture. She reports falling from her chair to a ceramic tile floor when the back pain began. She had one episode of urinary retention upon presentation to the ED, but this has reoccurred. She has been wearing a TLSO brace whenever out of bed and ambulating. She reports continued pain at 5/10 today in her mid back. It does not radiate down her legs. She has continued taking OxyIR 15mg roughly once per day. At best pain is 3/10 and at worst it is 7/10. She feels it has improved since she was in the hospital, but would like to move forward with kyphoplasty sooner rather than later due to continued pain. She denies b/b dysfunction, saddle anesthesia, and leg weakness. Denies falls.       (Not in a hospital admission)    Review of patient's allergies indicates:   Allergen Reactions    Mushroom Hives    Bactrim [sulfamethoxazole-trimethoprim] Hives    Codeine Hives    Gabapentin      Pruritis     Iodine     Penicillins Hives    Shellfish containing products Itching    Sulfa (sulfonamide antibiotics) Hives       Past Medical History:   Diagnosis Date    CHF (congestive heart failure)     CKD stage G3a/A3, GFR 45-59 and albumin creatinine ratio >300 mg/g 5/16/2018    CVA (cerebral vascular accident) 2017    Essential hypertension 4/9/2018    Factor XI deficiency 1978    GIB (gastrointestinal bleeding) 10/18/2018    History of hepatitis C     s/p succesful Rx w/ Harvoni by Dr Herbert  - SVR12 (cure) 2016    Hyperlipidemia     Mixed hyperlipidemia 4/9/2018    Postoperative hypothyroidism     Proteinuria 4/9/2018    Type 2 diabetes mellitus with circulatory disorder, with long-term current use of insulin 4/9/2018     Past Surgical History:   Procedure Laterality Date    BACK  SURGERY      COLONOSCOPY  11/13/2015    Dr Chino Herbert - brian prep. internal hemorrhoids. diverticulosis of sigmois. NO polyps. repeat due 2025    galbladder      HYSTERECTOMY      OOPHORECTOMY      one    TONSILLECTOMY       Family History   Problem Relation Age of Onset    Diabetes type II Mother      Social History     Tobacco Use    Smoking status: Never Smoker    Smokeless tobacco: Never Used   Substance Use Topics    Alcohol use: Yes     Alcohol/week: 2.4 oz     Types: 4 Cans of beer per week     Comment: weekly    Drug use: No        Review of Systems:  Constitutional: no fever or chills  Eyes: no visual changes  ENT: no nasal congestion or sore throat  Respiratory: no cough or shortness of breath  Cardiovascular: no chest pain or palpitations  Gastrointestinal: no nausea or vomiting, tolerating diet  Genitourinary: no hematuria or dysuria  Integument/Breast: no rash or pruritis  Hematologic/Lymphatic: no easy bruising or lymphadenopathy  Musculoskeletal: +LBP  Neurological: no seizures or tremors  Behavioral/Psych: no auditory or visual hallucinations  Endocrine: no heat or cold intolerance    OBJECTIVE:     Vital Signs (Most Recent):  Pulse: 77 (07/08/19 1140)  BP: (!) 146/79 (07/08/19 1140)    Physical Exam:  General: well developed, well nourished, no distress  Head: normocephalic, atraumatic  Neurologic: Alert and oriented. Thought content appropriate  GCS: Motor: 6/Verbal: 5/Eyes: 4 GCS Total: 15  Language: No aphasia  Speech: No dysarthria  Cranial nerves: face symmetric, tongue midline, CN II-XII grossly intact.   Eyes: pupils equal, round, reactive to light with accommodation, EOMI.   Pulmonary: normal respirations, not labored, no accessory muscles used  Sensory: intact to light touch throughout  Motor Strength: Moves all extremities spontaneously with good tone.  Full strength upper and lower extremities. No abnormal movements seen.     Strength   Iliopsoas Quadriceps Knee  Flexion  Tibialis  anterior Gastro- cnemius EHL   Lower: R 5/5 5/5 5/5 5/5 5/5 5/5    L 5/5 5/5 5/5 5/5 5/5 5/5     Clonus: absent  Skin: warm, dry and intact, no rashes  Gait: normal    Midline bony tenderness: positive in the upper lumbar spine    Diagnostic Results:  I have personally reviewed imaging and agree with the findings.     MRI lumbar spine 5/24/19  -Acute/subacute L2 superior endplate compression deformity with mild height loss.  -There is a small epidural hematoma present along the posterior margin of L2 on the left resulting in some flattening and compression of the adjacent thecal sac. This results in mild canal stenosis at this level and moderate narrowing of the lateral recess and neural foramen on the left.  -Chronic T12 compression deformity status post vertebroplasty with minimal retropulsion of posterosuperior fragment. No significant spinal canal stenosis.    CT lumbar spine 5/24/19  -Acute/subacute superior endplate compression deformity of the L2 vertebral body with mild height loss.  Associated small epidural hematoma described on MRI is not well characterized on this exam, refer to recent MRI lumbar spine for detailed evaluation.    Xray lumbar spine 5/24/19  -Please note images are not weight-bearing.  There is significant bony demineralization.  Allowing for this alignment appears satisfactory.  Post vertebroplasty changes T12 noted.    ASSESSMENT/PLAN:     Kathie Quezada is a 59 y.o. female who presents for hospital follow-up 6 weeks s/p acute L2 compression fracture. She has been wearing TLSO brace whenever out of bed x6 weeks. Pain has improved but she is still requiring oxyIR 15mg approximately once per day for pain relief. I will order standing xrays to assess for overall alignment and additional compression. Will also order updated MRI to assess continued inflammation within the fracture.     Will call patient with results. If fracture has compressed further or MRI shows delayed  healing, will schedule patient to see Dr. Kwon to discuss kyphoplasty. Otherwise, will plan for additional time in the brace and reassess in one month.     Refill for oxycodone printed for patient.       Please feel free to call with any further questions        Tomasa Dobson PA-C  [facility]  Department of Neurosurgery  [phone]

## 2019-07-15 ENCOUNTER — TELEPHONE (OUTPATIENT)
Dept: SLEEP MEDICINE | Facility: CLINIC | Age: 59
End: 2019-07-15

## 2019-07-16 NOTE — PROGRESS NOTES
Spoke to patient and scheduled appointments, unable to schedule Nephrology-placed on waitlist, Reminders mailed for all appointments.

## 2019-07-18 RX ORDER — CHOLECALCIFEROL (VITAMIN D3) 25 MCG
TABLET ORAL
Qty: 30 TABLET | Refills: 0 | OUTPATIENT
Start: 2019-07-18 | End: 2019-07-31

## 2019-07-20 ENCOUNTER — TELEPHONE (OUTPATIENT)
Dept: RADIOLOGY | Facility: HOSPITAL | Age: 59
End: 2019-07-20

## 2019-07-22 ENCOUNTER — HOSPITAL ENCOUNTER (OUTPATIENT)
Dept: RADIOLOGY | Facility: HOSPITAL | Age: 59
Discharge: HOME | End: 2019-07-22
Attending: PHYSICIAN ASSISTANT
Payer: MEDICARE

## 2019-07-22 DIAGNOSIS — S32.020D CLOSED COMPRESSION FRACTURE OF L2 LUMBAR VERTEBRA WITH ROUTINE HEALING, SUBSEQUENT ENCOUNTER: ICD-10-CM

## 2019-07-22 PROCEDURE — 72148 MRI LUMBAR SPINE W/O DYE: CPT | Mod: 26,,, | Performed by: RADIOLOGY

## 2019-07-22 PROCEDURE — 72148 MRI LUMBAR SPINE W/O DYE: CPT | Mod: TC

## 2019-07-22 PROCEDURE — 72148 MRI LUMBAR SPINE WITHOUT CONTRAST: ICD-10-PCS | Mod: 26,,, | Performed by: RADIOLOGY

## 2019-07-23 ENCOUNTER — OFFICE VISIT (OUTPATIENT)
Dept: NEUROSURGERY | Facility: CLINIC | Age: 59
End: 2019-07-23
Payer: MEDICARE

## 2019-07-23 VITALS
SYSTOLIC BLOOD PRESSURE: 177 MMHG | HEART RATE: 75 BPM | WEIGHT: 233.94 LBS | HEIGHT: 67 IN | BODY MASS INDEX: 36.72 KG/M2 | RESPIRATION RATE: 18 BRPM | DIASTOLIC BLOOD PRESSURE: 78 MMHG

## 2019-07-23 DIAGNOSIS — S32.020G CLOSED COMPRESSION FRACTURE OF L2 LUMBAR VERTEBRA WITH DELAYED HEALING, SUBSEQUENT ENCOUNTER: Primary | ICD-10-CM

## 2019-07-23 DIAGNOSIS — M54.50 MIDLINE LOW BACK PAIN WITHOUT SCIATICA, UNSPECIFIED CHRONICITY: ICD-10-CM

## 2019-07-23 PROCEDURE — 99214 OFFICE O/P EST MOD 30 MIN: CPT | Mod: PBBFAC | Performed by: NEUROLOGICAL SURGERY

## 2019-07-23 PROCEDURE — 99214 OFFICE O/P EST MOD 30 MIN: CPT | Mod: S$PBB,,, | Performed by: NEUROLOGICAL SURGERY

## 2019-07-23 PROCEDURE — 99999 PR PBB SHADOW E&M-EST. PATIENT-LVL IV: CPT | Mod: PBBFAC,,, | Performed by: NEUROLOGICAL SURGERY

## 2019-07-23 PROCEDURE — 99214 PR OFFICE/OUTPT VISIT, EST, LEVL IV, 30-39 MIN: ICD-10-PCS | Mod: S$PBB,,, | Performed by: NEUROLOGICAL SURGERY

## 2019-07-23 PROCEDURE — 99999 PR PBB SHADOW E&M-EST. PATIENT-LVL IV: ICD-10-PCS | Mod: PBBFAC,,, | Performed by: NEUROLOGICAL SURGERY

## 2019-07-23 NOTE — PATIENT INSTRUCTIONS
1. Surgery scheduled for 8/14 at [facility]  2. Preop clearance needed from PCP (Dr Price)  3. Preop labs, EKG, CXR ordered   4. Preop PAT appointment requested  5. STOP TAKING ASPIRIN, NSAIDS, AND ALL OTHER BLOOD THINNERS 7 DAYS PRIOR TO SURGERY

## 2019-07-23 NOTE — H&P (VIEW-ONLY)
CHIEF COMPLAINT:  Chief Complaint   Patient presents with    Lumbar Spine Pain (L-Spine)    Follow-up       HPI:  Kathie Quezada is a 59 y.o.  female with below listed PMH, who is referred for evaluation of thoracolumbar pain due to an L2 vertebral compression fracture suffered during a fall onto her left side on 05/23/2019.  She recently saw my PA who referred her for evaluation for kyphoplasty.  The patient has been wearing a TLSO brace for approximately 2 months but reports that the pain has not improved.  Pain is located on the left side in the middle of her low back.  She rates the pain as 8/10 on average, 10/10 is worse, and 3/10 at its best when she takes narcotics.  She has to lay down when the pain is at its highest.  She tries to avoid taking pain meds if possible.  She denies any leg weakness or numbness but reports tingling due to neuropathy in both her feet.  She had a previous T12 kyphoplasty done by Dr. Goins in the past which helped her pain immediately.  She is interested in undergoing a kyphoplasty again.  She is not had a recent bone density scan and does not take calcium replacement supplements.  She does not have an endocrinologist.      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    History of Present Illness (from PA note):  Kathie Quezada is a 59 y.o. female with CHF, CKD, and h/o CVA who presents for 6 week hospital follow-up after traumatic L2 compression fracture. She reports falling from her chair to a ceramic tile floor when the back pain began. She had one episode of urinary retention upon presentation to the ED, but this has reoccurred. She has been wearing a TLSO brace whenever out of bed and ambulating. She reports continued pain at 5/10 today in her mid back. It does not radiate down her legs. She has continued taking OxyIR 15mg roughly once per day. At best pain is 3/10 and at worst it is 7/10. She feels it has  improved since she was in the hospital, but would like to move forward with kyphoplasty sooner rather than later due to continued pain. She denies b/b dysfunction, saddle anesthesia, and leg weakness. Denies falls.   Review of patient's allergies indicates:   Allergen Reactions    Mushroom Hives    Bactrim [sulfamethoxazole-trimethoprim] Hives    Codeine Hives    Gabapentin      Pruritis     Iodine     Penicillins Hives    Shellfish containing products Itching    Sulfa (sulfonamide antibiotics) Hives       Past Medical History:   Diagnosis Date    CHF (congestive heart failure)     CKD stage G3a/A3, GFR 45-59 and albumin creatinine ratio >300 mg/g 5/16/2018    CVA (cerebral vascular accident) 2017    Essential hypertension 4/9/2018    Factor XI deficiency 1978    GIB (gastrointestinal bleeding) 10/18/2018    History of hepatitis C     s/p succesful Rx w/ Harvoni by Dr Herbert  - SVR12 (cure) 2016    Hyperlipidemia     Mixed hyperlipidemia 4/9/2018    Postoperative hypothyroidism     Proteinuria 4/9/2018    Type 2 diabetes mellitus with circulatory disorder, with long-term current use of insulin 4/9/2018     Past Surgical History:   Procedure Laterality Date    BACK SURGERY      COLONOSCOPY  11/13/2015    Dr Chino torres prep. internal hemorrhoids. diverticulosis of sigmois. NO polyps. repeat due 2025    galbladder      HYSTERECTOMY      OOPHORECTOMY      one    TONSILLECTOMY       Family History   Problem Relation Age of Onset    Diabetes type II Mother      Social History     Tobacco Use    Smoking status: Never Smoker    Smokeless tobacco: Never Used   Substance Use Topics    Alcohol use: Yes     Alcohol/week: 2.4 oz     Types: 4 Cans of beer per week     Comment: weekly    Drug use: No        Review of Systems   Constitutional: Negative.    Respiratory: Negative for cough and shortness of breath.    Cardiovascular: Negative for chest pain, palpitations, claudication and leg  swelling.   Gastrointestinal: Negative for abdominal pain, constipation and diarrhea.   Genitourinary: Negative for flank pain, frequency and urgency.   Musculoskeletal: Positive for back pain and falls. Negative for joint pain, myalgias and neck pain.   Skin: Negative.    Neurological: Positive for tingling (neuropathy bilat feet). Negative for dizziness, tremors, sensory change, speech change, focal weakness, seizures, loss of consciousness, weakness and headaches.   Psychiatric/Behavioral: Negative.        OBJECTIVE:   Vital Signs:  Pulse: 75 (07/23/19 1454)  Resp: 18 (07/23/19 1454)  BP: (!) 177/78 (07/23/19 1454)    Physical Exam:  Constitutional: Patient sitting in chair with TLSO brace on. Appears well developed and well nourished.  Skin: Exposed areas are intact without abnormal markings, rashes or other lesions.  HEENT: Normocephalic. Normal conjunctivae.  Cardiovascular: Normal rate and regular rhythm.  Respiratory: Chest wall rises and falls symmetrically, without signs of respiratory distress.  Abdomen: Soft and non-tender.  Extremities: Warm and without edema. Calves supple, non-tender.  Psych/Behavior: Normal affect.    Neurological:    Mental status: Alert and oriented. Conversational and appropriate.       Cranial Nerves: VFF to confrontation. PERRL. EOMI without nystagmus. Facial STLT normal and symmetric. Strong, symmetric muscles of mastication. Facial strength full and symmetric. Hearing equal bilaterally to finger rub. Palate and uvula rise and fall normally in midline. Shoulder shrug 5/5 strength. Tongue midline.     Motor:    Upper:  Deltoids Triceps Biceps WE WF  FA    R 5/5 5/5 5/5 5/5 5/5 5/5 5/5    L 5/5 5/5 5/5 5/5 5/5 5/5 5/5      Lower:  HF KE KF DF PF EHL    R 5/5 5/5 5/5 5/5 5/5 5/5    L 5/5 5/5 5/5 5/5 5/5 5/5     Sensory: Intact sensation to light touch and pinprick in all extremities.     Reflexes:      DTR: 2+ knees and biceps symmetrically.     Islas's: Negative.      Babinski's: Negative.     Clonus: Negative.    Cerebellar: Finger-to-nose and rapid alternating movements normal.     Gait:  Stable, fluid.    Spine:    Posture: Head well aligned over pelvis and shoulders in front and side views.  No focal or global spinal deformity visible on inspection.     Cervical:      ROM: Full with flexion, extension, lateral rotation and ear-to-shoulder bend.      Midline TTP: Negative.     Spurling's test: Negative.     Lhermitte's: Negative.    Thoracic:     Midline TTP: Negative    Lumbar:     Midline TTP:  Positive on L1-2 area     Straight Leg Test: Negative     Crossed Straight Leg Test: Negative    Other:     SI joint TTP: Negative.     Tenderness with external/internal hip rotation: Negative.    Diagnostic Results:  All imaging was independently reviewed by me.    MRI L spine, dated 7/22/19:  1. L2 VCF with STIR signal change (approx 40% JOSE ROBERTO)  2. Old T12 VCF s/p kypho    CT L spine, dated 5/24/19:  1. L2 VCF (23% JOSE ROBERTO)    Lspine Xray, 7/8/19:  L2 VCF without kyphosis or instability      ASSESSMENT/PLAN:     Problem List Items Addressed This Visit        Neuro    Closed compression fracture of L2 lumbar vertebra - Primary    Relevant Orders    Case Request Operating Room: L2 kyphoplasty  Fluoro  Globus (Completed)    Ambulatory Referral to Endocrinology       Orthopedic    Midline low back pain without sciatica    Relevant Orders    Ambulatory Referral to Endocrinology          VISIT SUMMARY:    Patient has L2 vertebral compression fracture which has progressed from prior imaging.  This is despite conservative measures including TLSO bracing and pain control.  She continues to have significant midline upper lumbar pain.  She would like to proceed with an L2 kyphoplasty as she has had good results in the past at T12.      Patient has factor 11 deficiency and will need to be managed appropriately in the perioperative period.    PATIENT EDUCATION:  More than half the clinic visit was  spent showing with patient the pertinent findings on imaging and educating the patient about natural history of the pathology.   We discussed options for treatment as well as the risks and benefits of each option.  All questions were answered.     The patient understands and agrees with the following plan of care.    1. Surgery scheduled for 8/14 at [facility] [place]-  2. Preop clearance needed from PCP (Dr Price) - need guidance re: Factor 11 deficiency  3. Preop labs, EKG, CXR ordered   4. Preop PAT appointment requested  5. STOP TAKING ASPIRIN, NSAIDS, AND ALL OTHER BLOOD THINNERS 7 DAYS PRIOR TO SURGERY    6. Referral to endocrinology re: osteoporosis and eligibility for prolia vs forteo  7. Rec DEXA scan in near future to eval osteoporosis                  .

## 2019-07-23 NOTE — PROGRESS NOTES
CHIEF COMPLAINT:  Chief Complaint   Patient presents with    Lumbar Spine Pain (L-Spine)    Follow-up       HPI:  Kathie Quezada is a 59 y.o.  female with below listed PMH, who is referred for evaluation of thoracolumbar pain due to an L2 vertebral compression fracture suffered during a fall onto her left side on 05/23/2019.  She recently saw my PA who referred her for evaluation for kyphoplasty.  The patient has been wearing a TLSO brace for approximately 2 months but reports that the pain has not improved.  Pain is located on the left side in the middle of her low back.  She rates the pain as 8/10 on average, 10/10 is worse, and 3/10 at its best when she takes narcotics.  She has to lay down when the pain is at its highest.  She tries to avoid taking pain meds if possible.  She denies any leg weakness or numbness but reports tingling due to neuropathy in both her feet.  She had a previous T12 kyphoplasty done by Dr. Goins in the past which helped her pain immediately.  She is interested in undergoing a kyphoplasty again.  She is not had a recent bone density scan and does not take calcium replacement supplements.  She does not have an endocrinologist.      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    History of Present Illness (from PA note):  Kathie Quezada is a 59 y.o. female with CHF, CKD, and h/o CVA who presents for 6 week hospital follow-up after traumatic L2 compression fracture. She reports falling from her chair to a ceramic tile floor when the back pain began. She had one episode of urinary retention upon presentation to the ED, but this has reoccurred. She has been wearing a TLSO brace whenever out of bed and ambulating. She reports continued pain at 5/10 today in her mid back. It does not radiate down her legs. She has continued taking OxyIR 15mg roughly once per day. At best pain is 3/10 and at worst it is 7/10. She feels it has  improved since she was in the hospital, but would like to move forward with kyphoplasty sooner rather than later due to continued pain. She denies b/b dysfunction, saddle anesthesia, and leg weakness. Denies falls.   Review of patient's allergies indicates:   Allergen Reactions    Mushroom Hives    Bactrim [sulfamethoxazole-trimethoprim] Hives    Codeine Hives    Gabapentin      Pruritis     Iodine     Penicillins Hives    Shellfish containing products Itching    Sulfa (sulfonamide antibiotics) Hives       Past Medical History:   Diagnosis Date    CHF (congestive heart failure)     CKD stage G3a/A3, GFR 45-59 and albumin creatinine ratio >300 mg/g 5/16/2018    CVA (cerebral vascular accident) 2017    Essential hypertension 4/9/2018    Factor XI deficiency 1978    GIB (gastrointestinal bleeding) 10/18/2018    History of hepatitis C     s/p succesful Rx w/ Harvoni by Dr Herbert  - SVR12 (cure) 2016    Hyperlipidemia     Mixed hyperlipidemia 4/9/2018    Postoperative hypothyroidism     Proteinuria 4/9/2018    Type 2 diabetes mellitus with circulatory disorder, with long-term current use of insulin 4/9/2018     Past Surgical History:   Procedure Laterality Date    BACK SURGERY      COLONOSCOPY  11/13/2015    Dr Chino torres prep. internal hemorrhoids. diverticulosis of sigmois. NO polyps. repeat due 2025    galbladder      HYSTERECTOMY      OOPHORECTOMY      one    TONSILLECTOMY       Family History   Problem Relation Age of Onset    Diabetes type II Mother      Social History     Tobacco Use    Smoking status: Never Smoker    Smokeless tobacco: Never Used   Substance Use Topics    Alcohol use: Yes     Alcohol/week: 2.4 oz     Types: 4 Cans of beer per week     Comment: weekly    Drug use: No        Review of Systems   Constitutional: Negative.    Respiratory: Negative for cough and shortness of breath.    Cardiovascular: Negative for chest pain, palpitations, claudication and leg  swelling.   Gastrointestinal: Negative for abdominal pain, constipation and diarrhea.   Genitourinary: Negative for flank pain, frequency and urgency.   Musculoskeletal: Positive for back pain and falls. Negative for joint pain, myalgias and neck pain.   Skin: Negative.    Neurological: Positive for tingling (neuropathy bilat feet). Negative for dizziness, tremors, sensory change, speech change, focal weakness, seizures, loss of consciousness, weakness and headaches.   Psychiatric/Behavioral: Negative.        OBJECTIVE:   Vital Signs:  Pulse: 75 (07/23/19 1454)  Resp: 18 (07/23/19 1454)  BP: (!) 177/78 (07/23/19 1454)    Physical Exam:  Constitutional: Patient sitting in chair with TLSO brace on. Appears well developed and well nourished.  Skin: Exposed areas are intact without abnormal markings, rashes or other lesions.  HEENT: Normocephalic. Normal conjunctivae.  Cardiovascular: Normal rate and regular rhythm.  Respiratory: Chest wall rises and falls symmetrically, without signs of respiratory distress.  Abdomen: Soft and non-tender.  Extremities: Warm and without edema. Calves supple, non-tender.  Psych/Behavior: Normal affect.    Neurological:    Mental status: Alert and oriented. Conversational and appropriate.       Cranial Nerves: VFF to confrontation. PERRL. EOMI without nystagmus. Facial STLT normal and symmetric. Strong, symmetric muscles of mastication. Facial strength full and symmetric. Hearing equal bilaterally to finger rub. Palate and uvula rise and fall normally in midline. Shoulder shrug 5/5 strength. Tongue midline.     Motor:    Upper:  Deltoids Triceps Biceps WE WF  FA    R 5/5 5/5 5/5 5/5 5/5 5/5 5/5    L 5/5 5/5 5/5 5/5 5/5 5/5 5/5      Lower:  HF KE KF DF PF EHL    R 5/5 5/5 5/5 5/5 5/5 5/5    L 5/5 5/5 5/5 5/5 5/5 5/5     Sensory: Intact sensation to light touch and pinprick in all extremities.     Reflexes:      DTR: 2+ knees and biceps symmetrically.     Islas's: Negative.      Babinski's: Negative.     Clonus: Negative.    Cerebellar: Finger-to-nose and rapid alternating movements normal.     Gait:  Stable, fluid.    Spine:    Posture: Head well aligned over pelvis and shoulders in front and side views.  No focal or global spinal deformity visible on inspection.     Cervical:      ROM: Full with flexion, extension, lateral rotation and ear-to-shoulder bend.      Midline TTP: Negative.     Spurling's test: Negative.     Lhermitte's: Negative.    Thoracic:     Midline TTP: Negative    Lumbar:     Midline TTP:  Positive on L1-2 area     Straight Leg Test: Negative     Crossed Straight Leg Test: Negative    Other:     SI joint TTP: Negative.     Tenderness with external/internal hip rotation: Negative.    Diagnostic Results:  All imaging was independently reviewed by me.    MRI L spine, dated 7/22/19:  1. L2 VCF with STIR signal change (approx 40% JOSE ROBERTO)  2. Old T12 VCF s/p kypho    CT L spine, dated 5/24/19:  1. L2 VCF (23% JOSE ROBERTO)    Lspine Xray, 7/8/19:  L2 VCF without kyphosis or instability      ASSESSMENT/PLAN:     Problem List Items Addressed This Visit        Neuro    Closed compression fracture of L2 lumbar vertebra - Primary    Relevant Orders    Case Request Operating Room: L2 kyphoplasty  Fluoro  Globus (Completed)    Ambulatory Referral to Endocrinology       Orthopedic    Midline low back pain without sciatica    Relevant Orders    Ambulatory Referral to Endocrinology          VISIT SUMMARY:    Patient has L2 vertebral compression fracture which has progressed from prior imaging.  This is despite conservative measures including TLSO bracing and pain control.  She continues to have significant midline upper lumbar pain.  She would like to proceed with an L2 kyphoplasty as she has had good results in the past at T12.      Patient has factor 11 deficiency and will need to be managed appropriately in the perioperative period.    PATIENT EDUCATION:  More than half the clinic visit was  spent showing with patient the pertinent findings on imaging and educating the patient about natural history of the pathology.   We discussed options for treatment as well as the risks and benefits of each option.  All questions were answered.     The patient understands and agrees with the following plan of care.    1. Surgery scheduled for 8/14 at [facility] [place]-  2. Preop clearance needed from PCP (Dr Price) - need guidance re: Factor 11 deficiency  3. Preop labs, EKG, CXR ordered   4. Preop PAT appointment requested  5. STOP TAKING ASPIRIN, NSAIDS, AND ALL OTHER BLOOD THINNERS 7 DAYS PRIOR TO SURGERY    6. Referral to endocrinology re: osteoporosis and eligibility for prolia vs forteo  7. Rec DEXA scan in near future to eval osteoporosis                  .

## 2019-07-24 ENCOUNTER — TELEPHONE (OUTPATIENT)
Dept: INTERNAL MEDICINE | Facility: CLINIC | Age: 59
End: 2019-07-24

## 2019-07-24 NOTE — TELEPHONE ENCOUNTER
----- Message from Elan Price MD sent at 7/24/2019 10:57 AM CDT -----  Darcy, this patient needs preop.  I have never seen her before.  You treated her a few times, last in June.    Please arrange appointment with me.    ----- Message -----  From: Jeremie Kwon DO  Sent: 7/23/2019   6:34 PM  To: Elan Price MD

## 2019-07-31 ENCOUNTER — LAB VISIT (OUTPATIENT)
Dept: LAB | Facility: HOSPITAL | Age: 59
End: 2019-07-31
Attending: INTERNAL MEDICINE
Payer: MEDICARE

## 2019-07-31 ENCOUNTER — OFFICE VISIT (OUTPATIENT)
Dept: INTERNAL MEDICINE | Facility: CLINIC | Age: 59
End: 2019-07-31
Payer: MEDICARE

## 2019-07-31 VITALS
SYSTOLIC BLOOD PRESSURE: 170 MMHG | BODY MASS INDEX: 37.34 KG/M2 | HEIGHT: 67 IN | OXYGEN SATURATION: 98 % | WEIGHT: 237.88 LBS | DIASTOLIC BLOOD PRESSURE: 80 MMHG | HEART RATE: 80 BPM

## 2019-07-31 DIAGNOSIS — E11.22 TYPE 2 DIABETES MELLITUS WITH STAGE 3 CHRONIC KIDNEY DISEASE, WITHOUT LONG-TERM CURRENT USE OF INSULIN: ICD-10-CM

## 2019-07-31 DIAGNOSIS — E89.0 POSTOPERATIVE HYPOTHYROIDISM: ICD-10-CM

## 2019-07-31 DIAGNOSIS — S32.020D CLOSED COMPRESSION FRACTURE OF L2 LUMBAR VERTEBRA WITH ROUTINE HEALING, SUBSEQUENT ENCOUNTER: ICD-10-CM

## 2019-07-31 DIAGNOSIS — N18.30 TYPE 2 DIABETES MELLITUS WITH STAGE 3 CHRONIC KIDNEY DISEASE, WITHOUT LONG-TERM CURRENT USE OF INSULIN: ICD-10-CM

## 2019-07-31 DIAGNOSIS — E55.9 VITAMIN D DEFICIENCY: ICD-10-CM

## 2019-07-31 DIAGNOSIS — D53.9 NUTRITIONAL ANEMIA: ICD-10-CM

## 2019-07-31 DIAGNOSIS — Z01.818 PREOP EXAMINATION: Primary | ICD-10-CM

## 2019-07-31 DIAGNOSIS — I15.0 RENOVASCULAR HYPERTENSION: ICD-10-CM

## 2019-07-31 DIAGNOSIS — R80.9 NEPHROTIC RANGE PROTEINURIA: ICD-10-CM

## 2019-07-31 DIAGNOSIS — I50.32 CHRONIC DIASTOLIC HEART FAILURE: ICD-10-CM

## 2019-07-31 DIAGNOSIS — E78.2 MIXED HYPERLIPIDEMIA: ICD-10-CM

## 2019-07-31 DIAGNOSIS — D64.9 NORMOCYTIC ANEMIA: ICD-10-CM

## 2019-07-31 DIAGNOSIS — D68.1 FACTOR XI DEFICIENCY: ICD-10-CM

## 2019-07-31 DIAGNOSIS — N18.32 CKD STAGE G3B/A3, GFR 30-44 AND ALBUMIN CREATININE RATIO >300 MG/G: ICD-10-CM

## 2019-07-31 PROBLEM — R41.82 ALTERED MENTAL STATUS: Status: RESOLVED | Noted: 2019-05-26 | Resolved: 2019-07-31

## 2019-07-31 PROBLEM — J96.02 ACUTE RESPIRATORY FAILURE WITH HYPOXIA AND HYPERCAPNIA: Status: RESOLVED | Noted: 2019-05-26 | Resolved: 2019-07-31

## 2019-07-31 PROBLEM — N17.0 ATN (ACUTE TUBULAR NECROSIS): Status: RESOLVED | Noted: 2019-02-08 | Resolved: 2019-07-31

## 2019-07-31 PROBLEM — R50.9 FEVER: Status: RESOLVED | Noted: 2019-05-25 | Resolved: 2019-07-31

## 2019-07-31 PROBLEM — J18.9 PNA (PNEUMONIA): Status: RESOLVED | Noted: 2019-05-28 | Resolved: 2019-07-31

## 2019-07-31 PROBLEM — J96.01 ACUTE RESPIRATORY FAILURE WITH HYPOXIA AND HYPERCAPNIA: Status: RESOLVED | Noted: 2019-05-26 | Resolved: 2019-07-31

## 2019-07-31 LAB
FERRITIN SERPL-MCNC: 135 NG/ML (ref 20–300)
IRON SERPL-MCNC: 60 UG/DL (ref 30–160)
SATURATED IRON: 17 % (ref 20–50)
TOTAL IRON BINDING CAPACITY: 357 UG/DL (ref 250–450)
TRANSFERRIN SERPL-MCNC: 241 MG/DL (ref 200–375)
VIT B12 SERPL-MCNC: 412 PG/ML (ref 210–950)

## 2019-07-31 PROCEDURE — 36415 COLL VENOUS BLD VENIPUNCTURE: CPT

## 2019-07-31 PROCEDURE — 99214 OFFICE O/P EST MOD 30 MIN: CPT | Mod: S$PBB,,, | Performed by: INTERNAL MEDICINE

## 2019-07-31 PROCEDURE — 99999 PR PBB SHADOW E&M-EST. PATIENT-LVL V: ICD-10-PCS | Mod: PBBFAC,,, | Performed by: INTERNAL MEDICINE

## 2019-07-31 PROCEDURE — 99214 PR OFFICE/OUTPT VISIT, EST, LEVL IV, 30-39 MIN: ICD-10-PCS | Mod: S$PBB,,, | Performed by: INTERNAL MEDICINE

## 2019-07-31 PROCEDURE — 99215 OFFICE O/P EST HI 40 MIN: CPT | Mod: PBBFAC | Performed by: INTERNAL MEDICINE

## 2019-07-31 PROCEDURE — 82728 ASSAY OF FERRITIN: CPT

## 2019-07-31 PROCEDURE — 83540 ASSAY OF IRON: CPT

## 2019-07-31 PROCEDURE — 85270 CLOT FACTOR XI PTA: CPT

## 2019-07-31 PROCEDURE — 99999 PR PBB SHADOW E&M-EST. PATIENT-LVL V: CPT | Mod: PBBFAC,,, | Performed by: INTERNAL MEDICINE

## 2019-07-31 PROCEDURE — 82607 VITAMIN B-12: CPT

## 2019-07-31 RX ORDER — LEVOTHYROXINE SODIUM 200 UG/1
200 TABLET ORAL
Qty: 90 TABLET | Refills: 0 | Status: SHIPPED | OUTPATIENT
Start: 2019-07-31 | End: 2019-11-01 | Stop reason: SDUPTHER

## 2019-07-31 RX ORDER — OXYCODONE HYDROCHLORIDE 15 MG/1
15 TABLET ORAL EVERY 6 HOURS PRN
Qty: 28 TABLET | Refills: 0 | Status: SHIPPED | OUTPATIENT
Start: 2019-07-31 | End: 2019-07-31

## 2019-07-31 RX ORDER — ERGOCALCIFEROL 1.25 MG/1
50000 CAPSULE ORAL
Qty: 12 CAPSULE | Refills: 3 | Status: SHIPPED | OUTPATIENT
Start: 2019-07-31 | End: 2019-11-08 | Stop reason: SDUPTHER

## 2019-07-31 RX ORDER — OXYCODONE HYDROCHLORIDE 15 MG/1
15 TABLET ORAL EVERY 6 HOURS PRN
Qty: 28 TABLET | Refills: 0 | Status: SHIPPED | OUTPATIENT
Start: 2019-07-31 | End: 2019-08-07

## 2019-07-31 RX ORDER — TRAZODONE HYDROCHLORIDE 50 MG/1
50 TABLET ORAL NIGHTLY PRN
Qty: 90 TABLET | Refills: 0 | Status: SHIPPED | OUTPATIENT
Start: 2019-07-31 | End: 2019-11-01 | Stop reason: SDUPTHER

## 2019-07-31 RX ORDER — SPIRONOLACTONE 25 MG/1
25 TABLET ORAL DAILY
Qty: 90 TABLET | Refills: 0 | Status: SHIPPED | OUTPATIENT
Start: 2019-07-31 | End: 2019-11-01 | Stop reason: SDUPTHER

## 2019-07-31 RX ORDER — TRAZODONE HYDROCHLORIDE 50 MG/1
50 TABLET ORAL NIGHTLY PRN
Qty: 90 TABLET | Refills: 0 | Status: SHIPPED | OUTPATIENT
Start: 2019-07-31 | End: 2019-07-31 | Stop reason: SDUPTHER

## 2019-07-31 RX ORDER — PANTOPRAZOLE SODIUM 40 MG/1
40 TABLET, DELAYED RELEASE ORAL DAILY
Qty: 90 TABLET | Refills: 0 | Status: SHIPPED | OUTPATIENT
Start: 2019-07-31 | End: 2019-11-08 | Stop reason: SDUPTHER

## 2019-07-31 NOTE — Clinical Note
Cleared for surgery: Proceed to L2 Kyphoplasty as scheduled.Continue current BP medsFFP 2 units right before surgery for Factor XI deficiency.

## 2019-07-31 NOTE — PROGRESS NOTES
"INTERNAL MEDICINE CLINIC  Follow-up Visit Progress Note     PRESENTING HISTORY     PCP: lEan Price MD    Current Chief Complaint/Problem:    Chief Complaint   Patient presents with    Pre-op Exam     History of Present Illness & ROS: Ms. Kathie Quezada is a 59 y.o. female.    She had a fall two months ago with L2 compression fracture.  She is going for surgery: 8/14/19.    Neurosurgery:  "Patient has L2 vertebral compression fracture which has progressed from prior imaging.  This is despite conservative measures including TLSO bracing and pain control.  She continues to have significant midline upper lumbar pain.  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    No chest pain or SOB.  Only back pain from fall.    PAST HISTORY:     Past Medical History:   Diagnosis Date    Acute respiratory failure with hypoxia and hypercapnia 5/26/2019    ATN (acute tubular necrosis) 2/8/2019    Chronic diastolic heart failure 2/7/2019 2-8-19 Mild left atrial enlargement. Mild left ventricular enlargement. Normal left ventricular systolic function. The estimated ejection fraction is 55% Indeterminate left ventricular diastolic function. Normal right ventricular systolic function. Mild mitral regurgitation. Mild to moderate tricuspid regurgitation. The estimated PA systolic pressure is 32 mm Hg Normal central venous pressure (3 m    CKD stage G3a/A3, GFR 45-59 and albumin creatinine ratio >300 mg/g 5/16/2018    CKD stage G3b/A3, GFR 30-44 and albumin creatinine ratio >300 mg/g 5/16/2018    Closed compression fracture of L2 lumbar vertebra 5/24/2019    CVA (cerebral vascular accident) 2017    Essential hypertension 4/9/2018    Factor XI deficiency 1978    GIB (gastrointestinal bleeding) 10/18/2018    History of hepatitis C     s/p succesful Rx w/ Harvoni  - SVR12 (cure) 2016    " Mixed hyperlipidemia 4/9/2018    PNA (pneumonia) 5/28/2019    Postoperative hypothyroidism     Renovascular hypertension 4/9/2018    Slow transit constipation 5/27/2019    Type 2 diabetes mellitus with circulatory disorder, with long-term current use of insulin 4/9/2018    Type 2 diabetes mellitus with stage 3 chronic kidney disease, without long-term current use of insulin 4/9/2018       Past Surgical History:   Procedure Laterality Date    BACK SURGERY  2017    CHOLECYSTECTOMY  2015    [place]    COLONOSCOPY  11/13/2015    Dr Chino torres prep. internal hemorrhoids. diverticulosis of sigmois. NO polyps. repeat due 2025    HYSTERECTOMY      OOPHORECTOMY      one    TH[facility]  2016    [facility] nodule    TONSILLECTOMY         Family History   Problem Relation Age of Onset    Diabetes type II Mother        Social History     Socioeconomic History    Marital status:      Spouse name: Not on file    Number of children: Not on file    Years of education: Not on file    Highest education level: Not on file   Occupational History    Not on file   Social Needs    Financial resource strain: Not on file    Food insecurity:     Worry: Not on file     Inability: Not on file    Transportation needs:     Medical: Not on file     Non-medical: Not on file   Tobacco Use    Smoking status: Never Smoker    Smokeless tobacco: Never Used   Substance and Sexual Activity    Alcohol use: Yes     Alcohol/week: 2.4 oz     Types: 4 Cans of beer per week     Comment: weekly    Drug use: No    Sexual activity: Not on file   Lifestyle    Physical activity:     Days per week: Not on file     Minutes per session: Not on file    Stress: Not on file   Relationships    Social connections:     Talks on phone: Not on file     Gets together: Not on file     Attends Restorationist service: Not on file     Active member of club or organization: Not on file     Attends meetings of clubs or organizations:  "Not on file     Relationship status: Not on file   Other Topics Concern    Not on file   Social History Narrative    Not on file       MEDICATIONS & ALLERGIES:     Current Outpatient Medications on File Prior to Visit   Medication Sig Dispense Refill    amLODIPine (NORVASC) 10 MG tablet Take 1 tablet (10 mg total) by mouth once daily. 30 tablet 11    atorvastatin (LIPITOR) 40 MG tablet Take 1 tablet (40 mg total) by mouth every evening.      blood sugar diagnostic Strp To check BG 1 times daily, to use with insurance preferred meter 35 strip 11    carvedilol (COREG) 25 MG tablet Take 25 mg by mouth 2 (two) times daily with meals.       escitalopram oxalate (LEXAPRO) 10 MG tablet Take 10 mg by mouth once daily.      lancets Misc To check BG 1 times daily, to use with insurance preferred meter 50 each 11    oxyCODONE (ROXICODONE) 15 MG Tab Take 1 tablet (15 mg total) by mouth every 12 (twelve) hours as needed for Pain. Take for severe pain. 20 tablet 0    pen needle, diabetic (BD ULTRA-FINE OLGA LIDIA PEN NEEDLE) 32 gauge x 5/32" Ndle To use with Lantus solostar insulin pens 100 each 3    polyethylene glycol (GLYCOLAX) 17 gram/dose powder Take 17 g by mouth once daily. 1 Bottle 3            ergocalciferol (ERGOCALCIFEROL) 50,000 unit Cap Take 50,000 Units by mouth.        levothyroxine (SYNTHROID) 200 MCG tablet TAKE 1 TABLET BY MOUTH DAILY AT 6 AM FOR 30 DAYS 30 tablet 0            pantoprazole (PROTONIX) 40 MG tablet TAKE 1 TABLET BY MOUTH DAILY FOR 30 DAYS 30 tablet 0           traZODone (DESYREL) 50 MG tablet TAKE 1 TABLET BY MOUTH NIGHTLY FOR 30 DAYS 30 tablet 0                          No current facility-administered medications on file prior to visit.         Review of patient's allergies indicates:   Allergen Reactions    Mushroom Hives    Bactrim [sulfamethoxazole-trimethoprim] Hives    Codeine Hives    Gabapentin      Pruritis     Iodine     Penicillins Hives    Shellfish containing products " Itching    Sulfa (sulfonamide antibiotics) Hives       Medications Reconciliation:   I have reconciled the patient's home medications and discharge medications with the patient/family. I have updated all changes.  Refer to After-Visit Medication List.    OBJECTIVE:     Vital Signs:  Vitals:    19 1609   BP: (!) 170/80   Pulse: 80     Wt Readings from Last 1 Encounters:   19 1609 107.9 kg (237 lb 14 oz)     Body mass index is 37.26 kg/m².     Physical Exam:  General: Well developed, well nourished. No distress.  HEENT: Head is normocephalic, atraumatic; ears are normal.    Eyes: Clear conjunctiva.  Neck: Supple, symmetrical neck; trachea midline.  Lungs: Clear to auscultation bilaterally and normal respiratory effort.  Cardiovascular: Heart with regular rate and rhythm.    Extremities: No LE edema. Pulses 2+ and symmetric.   Abdomen: Abdomen is soft, non-tender non-distended with normal bowel sounds.  Skin: Skin color, texture, turgor normal. No rashes.  Musculoskeletal: Normal gait.  Psychiatric: Normal affect. Alert.    Laboratory  Lab Results   Component Value Date    WBC 11.17 2019    HGB 11.1 (L) 2019    HCT 33.7 (L) 2019     2019    CHOL 213 (H) 10/18/2018    TRIG 109 10/18/2018    HDL 76 (H) 10/18/2018    ALT 10 2019    AST 10 2019     2019    K 4.1 2019     2019    CREATININE 2.4 (H) 2019    BUN 32 (H) 2019    CO2 24 2019    TSH 0.262 (L) 2019    INR 1.1 2019    HGBA1C 4.8 2019       ASSESSMENT & PLAN:     Preop examination  Closed compression fracture of L2 lumbar vertebra with routine healing, subsequent encounter  - No history of CAD.    History of chronic diastolic heart failure.      CXR: 19 Normal  EC NSR  Echo:   · Mild left atrial enlargement.  · Mild left ventricular enlargement.  · Normal left ventricular systolic function. The estimated ejection fraction is  55%  · Indeterminate left ventricular diastolic function.  · Normal right ventricular systolic function.  · Mild mitral regurgitation.  · Mild to moderate tricuspid regurgitation.  · The estimated PA systolic pressure is 32 mm Hg  · Normal central venous pressure (3 mm Hg).  · Trivial peircumferential pericardial effusion.    Plan:  Proceed to L2 Kyphoplasty as scheduled.             Continue current BP meds             FFP 2 units right before surgery for Factor XI deficiency.                Factor XI deficiency   Ref. Range 5/24/2019 11:03   Protime Latest Ref Range: 9.0 - 12.5 sec 10.9   Coumadin Monitoring INR Latest Ref Range: 0.8 - 1.2  1.1   aPTT Latest Ref Range: 21.0 - 32.0 sec 28.2       -     Check FACTOR 11 ASSAY; Future; Expected date: 07/31/2019    Chronic diastolic heart failure  CKD stage G3b/A3, GFR 30-44 and albumin creatinine ratio >300 mg/g  Nephrotic range proteinuria  Renovascular hypertension  - On amlodipine 10 mg daily, Coreg 25 mg BID.    BP elevated due to severe back pain.    Plan:  Pain control.   -     oxyCODONE (ROXICODONE) 15 MG Tab; Take 1 tablet (15 mg total) by mouth every 6 (six) hours as needed for Pain. Take for severe pain.  Dispense: 28 tablet; Refill: 0  -     traZODone (DESYREL) 50 MG tablet; Take 1 tablet (50 mg total) by mouth nightly as needed for Insomnia.  Dispense: 90 tablet; Refill: 0    Add:  -     spironolactone (ALDACTONE) 25 MG tablet; Take 1 tablet (25 mg total) by mouth once daily.  Dispense: 90 tablet; Refill: 00    Vitamin D deficiency  -     ergocalciferol (ERGOCALCIFEROL) 50,000 unit Cap; Take 1 capsule (50,000 Units total) by mouth every 7 days.  Dispense: 12 capsule; Refill: 3    Postoperative hypothyroidism  Refilled:  -     levothyroxine (SYNTHROID) 200 MCG tablet; Take 1 tablet (200 mcg total) by mouth before breakfast.  Dispense: 90 tablet; Refill: 0    Type 2 diabetes mellitus with stage 3 chronic kidney disease, without long-term current use of  insulin  - Diet controlled. A1c 4.8  -     Ambulatory consult to Podiatry  -     Ambulatory consult to Optometry    Mixed hyperlipidemia  - On Lipitor 40 mg daily.    Normocytic anemia  -     Vitamin B12; Future; Expected date: 07/31/2019  -     Iron and TIBC; Future; Expected date: 07/31/2019  -     Ferritin; Future; Expected date: 07/31/2019    Preventive Health Maintenance:  Podiatry and Optometry referral      Return to Clinic for Follow Up with me:   10-1-19    Scheduled Follow-up :  Future Appointments   Date Time Provider Department Center   8/7/2019 11:00 AM PRE-ADMIT 2, [facility]   8/12/2019  8:00 AM [facility] OIC-US1 MASTER NOM ULTR [facility]   8/12/2019  9:10 AM LAB, APPOINTMENT [facility] INTMED [facility] LAB IM [facility]   8/20/2019 10:00 AM Avril Scott NP [facility] SLEEP [facility]   10/1/2019 10:30 AM Elan Price MD [facility] IM [facility]       After Visit Medication List :     Medication List           Accurate as of 7/31/19  4:49 PM. If you have any questions, ask your nurse or doctor.               START taking these medications    spironolactone 25 MG tablet  Commonly known as:  ALDACTONE  Take 1 tablet (25 mg total) by mouth once daily.  Started by:  Elan Price MD        CHANGE how you take these medications    atorvastatin 40 MG tablet  Commonly known as:  LIPITOR  Take 1 tablet (40 mg total) by mouth every evening.  What changed:  Another medication with the same name was removed. Continue taking this medication, and follow the directions you see here.  Changed by:  Elan Price MD     ergocalciferol 50,000 unit Cap  Commonly known as:  ERGOCALCIFEROL  Take 1 capsule (50,000 Units total) by mouth every 7 days.  What changed:  when to take this  Changed by:  Elan Price MD     levothyroxine 200 MCG tablet  Commonly known as:  SYNTHROID  Take 1 tablet (200 mcg total) by mouth before breakfast.  What changed:  See the new instructions.  Changed by:  Elan  "SHILPI Price MD     oxyCODONE 15 MG Tab  Commonly known as:  ROXICODONE  Take 1 tablet (15 mg total) by mouth every 6 (six) hours as needed for Pain. Take for severe pain.  What changed:  when to take this  Changed by:  Elan Price MD     traZODone 50 MG tablet  Commonly known as:  DESYREL  Take 1 tablet (50 mg total) by mouth nightly as needed for Insomnia.  What changed:  See the new instructions.  Changed by:  Elan Price MD        CONTINUE taking these medications    amLODIPine 10 MG tablet  Commonly known as:  NORVASC  Take 1 tablet (10 mg total) by mouth once daily.     blood sugar diagnostic Strp  To check BG 1 times daily, to use with insurance preferred meter     carvedilol 25 MG tablet  Commonly known as:  COREG     escitalopram oxalate 10 MG tablet  Commonly known as:  LEXAPRO     lancets Misc  To check BG 1 times daily, to use with insurance preferred meter     pantoprazole 40 MG tablet  Commonly known as:  PROTONIX  Take 1 tablet (40 mg total) by mouth once daily. FOR 30 DAYS     pen needle, diabetic 32 gauge x 5/32" Ndle  Commonly known as:  BD ULTRA-FINE OLGA LIDIA PEN NEEDLE  To use with Lantus solostar insulin pens     polyethylene glycol 17 gram/dose powder  Commonly known as:  GLYCOLAX  Take 17 g by mouth once daily.        STOP taking these medications    aspirin 81 MG EC tablet  Commonly known as:  ECOTRIN  Stopped by:  Elan Price MD     furosemide 40 MG tablet  Commonly known as:  LASIX  Stopped by:  Elan Price MD     lisinopril 40 MG tablet  Commonly known as:  PRINIVIL,ZESTRIL  Stopped by:  Elan Price MD     traMADol 50 mg tablet  Commonly known as:  ULTRAM  Stopped by:  Elan Price MD     VITAMIN D3 1000 units Tab  Generic drug:  vitamin D  Stopped by:  Elan Price MD           Where to Get Your Medications      [id number] [address] AT [address], [facility] [address]    " Phone:  [phone]   · ergocalciferol 50,000 unit Cap  · levothyroxine 200 MCG tablet  · oxyCODONE 15 MG Tab  · pantoprazole 40 MG tablet  · spironolactone 25 MG tablet  · traZODone 50 MG tablet         Signing Physician:  Elan Price MD

## 2019-08-01 LAB — FACT XI ACT/NOR PPP: 96 % (ref 55–145)

## 2019-08-06 ENCOUNTER — TELEPHONE (OUTPATIENT)
Dept: RESEARCH | Facility: OTHER | Age: 59
End: 2019-08-06

## 2019-08-07 ENCOUNTER — HOSPITAL ENCOUNTER (OUTPATIENT)
Dept: PREADMISSION TESTING | Facility: HOSPITAL | Age: 59
Discharge: HOME | End: 2019-08-07
Attending: NEUROLOGICAL SURGERY
Payer: MEDICARE

## 2019-08-07 VITALS
OXYGEN SATURATION: 100 % | DIASTOLIC BLOOD PRESSURE: 94 MMHG | RESPIRATION RATE: 17 BRPM | WEIGHT: 240.75 LBS | HEART RATE: 76 BPM | TEMPERATURE: 98 F | HEIGHT: 70 IN | SYSTOLIC BLOOD PRESSURE: 169 MMHG | BODY MASS INDEX: 34.47 KG/M2

## 2019-08-07 DIAGNOSIS — Z01.818 PRE-OP TESTING: Primary | ICD-10-CM

## 2019-08-07 DIAGNOSIS — Z01.818 PRE-OP EVALUATION: ICD-10-CM

## 2019-08-07 LAB
ANION GAP SERPL CALC-SCNC: 9 MMOL/L (ref 8–16)
APTT BLDCRRT: 38.3 SEC (ref 21–32)
BASOPHILS # BLD AUTO: 0.03 K/UL (ref 0–0.2)
BASOPHILS NFR BLD: 0.3 % (ref 0–1.9)
BUN SERPL-MCNC: 36 MG/DL (ref 6–20)
CALCIUM SERPL-MCNC: 9.5 MG/DL (ref 8.7–10.5)
CHLORIDE SERPL-SCNC: 106 MMOL/L (ref 95–110)
CO2 SERPL-SCNC: 25 MMOL/L (ref 23–29)
CREAT SERPL-MCNC: 2.4 MG/DL (ref 0.5–1.4)
DIFFERENTIAL METHOD: ABNORMAL
EOSINOPHIL # BLD AUTO: 0 K/UL (ref 0–0.5)
EOSINOPHIL NFR BLD: 0.1 % (ref 0–8)
ERYTHROCYTE [DISTWIDTH] IN BLOOD BY AUTOMATED COUNT: 14.1 % (ref 11.5–14.5)
EST. GFR  (AFRICAN AMERICAN): 25 ML/MIN/1.73 M^2
EST. GFR  (NON AFRICAN AMERICAN): 22 ML/MIN/1.73 M^2
GLUCOSE SERPL-MCNC: 93 MG/DL (ref 70–110)
HCT VFR BLD AUTO: 35.3 % (ref 37–48.5)
HGB BLD-MCNC: 11.3 G/DL (ref 12–16)
INR PPP: 1 (ref 0.8–1.2)
LYMPHOCYTES # BLD AUTO: 2.5 K/UL (ref 1–4.8)
LYMPHOCYTES NFR BLD: 23.5 % (ref 18–48)
MCH RBC QN AUTO: 26.4 PG (ref 27–31)
MCHC RBC AUTO-ENTMCNC: 32 G/DL (ref 32–36)
MCV RBC AUTO: 83 FL (ref 82–98)
MONOCYTES # BLD AUTO: 0.7 K/UL (ref 0.3–1)
MONOCYTES NFR BLD: 6.9 % (ref 4–15)
NEUTROPHILS # BLD AUTO: 7.4 K/UL (ref 1.8–7.7)
NEUTROPHILS NFR BLD: 69.8 % (ref 38–73)
PLATELET # BLD AUTO: 220 K/UL (ref 150–350)
PMV BLD AUTO: 10.3 FL (ref 9.2–12.9)
POTASSIUM SERPL-SCNC: 4.4 MMOL/L (ref 3.5–5.1)
PROTHROMBIN TIME: 10.7 SEC (ref 9–12.5)
RBC # BLD AUTO: 4.28 M/UL (ref 4–5.4)
SODIUM SERPL-SCNC: 140 MMOL/L (ref 136–145)
WBC # BLD AUTO: 10.66 K/UL (ref 3.9–12.7)

## 2019-08-07 PROCEDURE — 80048 BASIC METABOLIC PNL TOTAL CA: CPT

## 2019-08-07 PROCEDURE — 85610 PROTHROMBIN TIME: CPT

## 2019-08-07 PROCEDURE — 85025 COMPLETE CBC W/AUTO DIFF WBC: CPT

## 2019-08-07 PROCEDURE — 85730 THROMBOPLASTIN TIME PARTIAL: CPT

## 2019-08-07 PROCEDURE — 93010 EKG 12-LEAD: ICD-10-PCS | Mod: ,,, | Performed by: INTERNAL MEDICINE

## 2019-08-07 PROCEDURE — 93005 ELECTROCARDIOGRAM TRACING: CPT

## 2019-08-07 PROCEDURE — 36415 COLL VENOUS BLD VENIPUNCTURE: CPT

## 2019-08-07 PROCEDURE — 93010 ELECTROCARDIOGRAM REPORT: CPT | Mod: ,,, | Performed by: INTERNAL MEDICINE

## 2019-08-07 NOTE — DISCHARGE INSTRUCTIONS
"Your procedure  is scheduled for _Wednesday AUGUST 14, 2019_________.    Call [phone] between 2pm and 5pm on _Tuesday AUGUST 13, 2019______to find out your arrival time for the day of surgery.    Report to Same Day Surgery Unit at ____ AM on the 2nd floor of the hospital.  Use the front entrance of the hospital.  The front doors of the hospital open promptly at 5:30am.  If you need wheelchair assistance, call [phone] from your cell phone, or call "0" from the courtesy phone in the lobby.    Important instructions:   Do not eat or drink after 12 midnight, including water.  It is okay to brush your teeth.  Do not have gum, candy or mints.    TAKE   CARVEDILOL,  LEXAPRO,  LEVOTHYROXINE,  PANTOPRAZOLE  AND  SPIRONALACTONE  WITH A SIP OF WATER THE AM OF SURGERY      Stop taking Aspirin, Ibuprofen, Motrin and Aleve , Fish oil, and Vitamin E for at least 7 days before your surgery.                                                                                                                                                                                               You may use Tylenol unless otherwise instructed by your doctor.          Return to the hospital lab on Tuesday AUGUST 13, 2019  @   10:00AM____ for additional blood test.   Lab open Monday -Friday   7am-7pm                   Saturday and Sunday  8am to 12 noon                   Closed on holidays.       Prep instructions:    SHOWER   OTHER_____________     Please shower the night before and the morning of your surgery.      Use Hibiclens soap as instructed by your pre op nurse.   Please place clean linens on your bed the night before surgery. Please wear fresh clean clothing after each shower.     No shaving of procedural area at least 4-5 days before surgery due to increased risk of skin irritation and/or possible infection.      Do not wear make- up, including mascara.     You may wear deodorant only.      Do not wear powder, body lotion " or perfume/cologne.     Do not wear any jewelry or have any metal on your body.     You will be asked to remove any dentures or partials for the procedure.     If you are going home on the same day of surgery, you must arrange for a family member or a friend to drive you home.  Public transportation is prohibited.  You will not be able to drive home if you were given anesthesia or sedation.     Wear loose fitting clothes allowing for bandages.     Please leave money and valuables home.       You may bring your cell phone.     Call the doctor if fever or illness should occur before your surgery.    Call [phone] to contact us here if needed.

## 2019-08-12 ENCOUNTER — HOSPITAL ENCOUNTER (OUTPATIENT)
Dept: RADIOLOGY | Facility: HOSPITAL | Age: 59
Discharge: HOME | End: 2019-08-12
Attending: PHYSICIAN ASSISTANT
Payer: MEDICARE

## 2019-08-12 DIAGNOSIS — K76.9 LIVER LESION: ICD-10-CM

## 2019-08-12 PROCEDURE — 76705 US ABDOMEN LIMITED: ICD-10-PCS | Mod: 26,,, | Performed by: INTERNAL MEDICINE

## 2019-08-12 PROCEDURE — 76705 ECHO EXAM OF ABDOMEN: CPT | Mod: TC

## 2019-08-12 PROCEDURE — 76705 ECHO EXAM OF ABDOMEN: CPT | Mod: 26,,, | Performed by: INTERNAL MEDICINE

## 2019-08-13 ENCOUNTER — ANESTHESIA EVENT (OUTPATIENT)
Dept: SURGERY | Facility: HOSPITAL | Age: 59
End: 2019-08-13
Payer: MEDICARE

## 2019-08-13 ENCOUNTER — LAB VISIT (OUTPATIENT)
Dept: LAB | Facility: HOSPITAL | Age: 59
End: 2019-08-13
Attending: NEUROLOGICAL SURGERY
Payer: MEDICARE

## 2019-08-13 DIAGNOSIS — Z01.818 PRE-OP TESTING: ICD-10-CM

## 2019-08-13 LAB
ABO + RH BLD: NORMAL
BLD GP AB SCN CELLS X3 SERPL QL: NORMAL
RH BLD: NORMAL

## 2019-08-13 PROCEDURE — 86850 RBC ANTIBODY SCREEN: CPT

## 2019-08-13 PROCEDURE — 36415 COLL VENOUS BLD VENIPUNCTURE: CPT

## 2019-08-13 PROCEDURE — 86901 BLOOD TYPING SEROLOGIC RH(D): CPT

## 2019-08-14 ENCOUNTER — HOSPITAL ENCOUNTER (OUTPATIENT)
Facility: HOSPITAL | Age: 59
Discharge: HOME | End: 2019-08-14
Attending: NEUROLOGICAL SURGERY | Admitting: NEUROLOGICAL SURGERY
Payer: MEDICARE

## 2019-08-14 ENCOUNTER — ANESTHESIA (OUTPATIENT)
Dept: SURGERY | Facility: HOSPITAL | Age: 59
End: 2019-08-14
Payer: MEDICARE

## 2019-08-14 VITALS
OXYGEN SATURATION: 93 % | TEMPERATURE: 97 F | SYSTOLIC BLOOD PRESSURE: 162 MMHG | HEIGHT: 70 IN | HEART RATE: 83 BPM | WEIGHT: 240.75 LBS | RESPIRATION RATE: 18 BRPM | DIASTOLIC BLOOD PRESSURE: 76 MMHG | BODY MASS INDEX: 34.47 KG/M2

## 2019-08-14 DIAGNOSIS — Z98.890 S/P KYPHOPLASTY: ICD-10-CM

## 2019-08-14 DIAGNOSIS — S32.020G CLOSED COMPRESSION FRACTURE OF L2 LUMBAR VERTEBRA WITH DELAYED HEALING, SUBSEQUENT ENCOUNTER: Primary | ICD-10-CM

## 2019-08-14 DIAGNOSIS — S32.000A LUMBAR COMPRESSION FRACTURE: ICD-10-CM

## 2019-08-14 DIAGNOSIS — D68.1 FACTOR XI DEFICIENCY: ICD-10-CM

## 2019-08-14 LAB
BLD PROD TYP BPU: NORMAL
BLD PROD TYP BPU: NORMAL
BLOOD UNIT EXPIRATION DATE: NORMAL
BLOOD UNIT EXPIRATION DATE: NORMAL
BLOOD UNIT TYPE CODE: 1700
BLOOD UNIT TYPE CODE: 7300
BLOOD UNIT TYPE: NORMAL
BLOOD UNIT TYPE: NORMAL
CODING SYSTEM: NORMAL
CODING SYSTEM: NORMAL
DISPENSE STATUS: NORMAL
DISPENSE STATUS: NORMAL
NUM UNITS TRANS FFP: NORMAL
NUM UNITS TRANS FFP: NORMAL
POCT GLUCOSE: 125 MG/DL (ref 70–110)

## 2019-08-14 PROCEDURE — 37000008 HC ANESTHESIA 1ST 15 MINUTES: Performed by: NEUROLOGICAL SURGERY

## 2019-08-14 PROCEDURE — 27201423 OPTIME MED/SURG SUP & DEVICES STERILE SUPPLY: Performed by: NEUROLOGICAL SURGERY

## 2019-08-14 PROCEDURE — 25000003 PHARM REV CODE 250: Performed by: PHYSICIAN ASSISTANT

## 2019-08-14 PROCEDURE — 25000003 PHARM REV CODE 250: Performed by: NURSE ANESTHETIST, CERTIFIED REGISTERED

## 2019-08-14 PROCEDURE — 25000003 PHARM REV CODE 250: Performed by: NEUROLOGICAL SURGERY

## 2019-08-14 PROCEDURE — 71000016 HC POSTOP RECOV ADDL HR: Performed by: NEUROLOGICAL SURGERY

## 2019-08-14 PROCEDURE — C1713 ANCHOR/SCREW BN/BN,TIS/BN: HCPCS | Performed by: NEUROLOGICAL SURGERY

## 2019-08-14 PROCEDURE — D9220A PRA ANESTHESIA: Mod: ANES,,, | Performed by: ANESTHESIOLOGY

## 2019-08-14 PROCEDURE — 63600175 PHARM REV CODE 636 W HCPCS: Performed by: PHYSICIAN ASSISTANT

## 2019-08-14 PROCEDURE — 63600175 PHARM REV CODE 636 W HCPCS: Performed by: ANESTHESIOLOGY

## 2019-08-14 PROCEDURE — 63600175 PHARM REV CODE 636 W HCPCS: Performed by: NURSE ANESTHETIST, CERTIFIED REGISTERED

## 2019-08-14 PROCEDURE — 71000033 HC RECOVERY, INTIAL HOUR: Performed by: NEUROLOGICAL SURGERY

## 2019-08-14 PROCEDURE — 82962 GLUCOSE BLOOD TEST: CPT | Performed by: NEUROLOGICAL SURGERY

## 2019-08-14 PROCEDURE — S0020 INJECTION, BUPIVICAINE HYDRO: HCPCS | Performed by: NEUROLOGICAL SURGERY

## 2019-08-14 PROCEDURE — 71000015 HC POSTOP RECOV 1ST HR: Performed by: NEUROLOGICAL SURGERY

## 2019-08-14 PROCEDURE — 86985 SPLIT BLOOD OR PRODUCTS: CPT

## 2019-08-14 PROCEDURE — D9220A PRA ANESTHESIA: ICD-10-PCS | Mod: CRNA,,, | Performed by: NURSE ANESTHETIST, CERTIFIED REGISTERED

## 2019-08-14 PROCEDURE — 36000706: Performed by: NEUROLOGICAL SURGERY

## 2019-08-14 PROCEDURE — P9017 PLASMA 1 DONOR FRZ W/IN 8 HR: HCPCS

## 2019-08-14 PROCEDURE — 63600175 PHARM REV CODE 636 W HCPCS: Performed by: NEUROLOGICAL SURGERY

## 2019-08-14 PROCEDURE — 22514 PR PERQ VERTEBRAL AUGMENTATION UNI/BILAT LUMBAR: ICD-10-PCS | Mod: ,,, | Performed by: NEUROLOGICAL SURGERY

## 2019-08-14 PROCEDURE — P9011 BLOOD SPLIT UNIT: HCPCS

## 2019-08-14 PROCEDURE — 22514 PERQ VERTEBRAL AUGMENTATION: CPT | Mod: ,,, | Performed by: NEUROLOGICAL SURGERY

## 2019-08-14 PROCEDURE — D9220A PRA ANESTHESIA: ICD-10-PCS | Mod: ANES,,, | Performed by: ANESTHESIOLOGY

## 2019-08-14 PROCEDURE — 37000009 HC ANESTHESIA EA ADD 15 MINS: Performed by: NEUROLOGICAL SURGERY

## 2019-08-14 PROCEDURE — C1726 CATH, BAL DIL, NON-VASCULAR: HCPCS | Performed by: NEUROLOGICAL SURGERY

## 2019-08-14 PROCEDURE — D9220A PRA ANESTHESIA: Mod: CRNA,,, | Performed by: NURSE ANESTHETIST, CERTIFIED REGISTERED

## 2019-08-14 PROCEDURE — 71000039 HC RECOVERY, EACH ADD'L HOUR: Performed by: NEUROLOGICAL SURGERY

## 2019-08-14 PROCEDURE — 25500020 PHARM REV CODE 255: Performed by: NEUROLOGICAL SURGERY

## 2019-08-14 PROCEDURE — 36000707: Performed by: NEUROLOGICAL SURGERY

## 2019-08-14 RX ORDER — DIPHENHYDRAMINE HYDROCHLORIDE 50 MG/ML
25 INJECTION INTRAMUSCULAR; INTRAVENOUS ONCE
Status: COMPLETED | OUTPATIENT
Start: 2019-08-14 | End: 2019-08-14

## 2019-08-14 RX ORDER — LIDOCAINE HCL/PF 100 MG/5ML
SYRINGE (ML) INTRAVENOUS
Status: DISCONTINUED | OUTPATIENT
Start: 2019-08-14 | End: 2019-08-14

## 2019-08-14 RX ORDER — FENTANYL CITRATE 50 UG/ML
INJECTION, SOLUTION INTRAMUSCULAR; INTRAVENOUS
Status: DISCONTINUED | OUTPATIENT
Start: 2019-08-14 | End: 2019-08-14

## 2019-08-14 RX ORDER — HYDROMORPHONE HYDROCHLORIDE 2 MG/ML
0.2 INJECTION, SOLUTION INTRAMUSCULAR; INTRAVENOUS; SUBCUTANEOUS EVERY 5 MIN PRN
Status: DISCONTINUED | OUTPATIENT
Start: 2019-08-14 | End: 2019-08-14 | Stop reason: HOSPADM

## 2019-08-14 RX ORDER — LIDOCAINE HYDROCHLORIDE 10 MG/ML
1 INJECTION, SOLUTION EPIDURAL; INFILTRATION; INTRACAUDAL; PERINEURAL ONCE
Status: DISCONTINUED | OUTPATIENT
Start: 2019-08-14 | End: 2019-08-14 | Stop reason: HOSPADM

## 2019-08-14 RX ORDER — ONDANSETRON 2 MG/ML
INJECTION INTRAMUSCULAR; INTRAVENOUS
Status: DISCONTINUED | OUTPATIENT
Start: 2019-08-14 | End: 2019-08-14

## 2019-08-14 RX ORDER — OXYCODONE HYDROCHLORIDE 15 MG/1
15 TABLET ORAL EVERY 6 HOURS PRN
Qty: 40 TABLET | Refills: 0 | Status: SHIPPED | OUTPATIENT
Start: 2019-08-14 | End: 2019-08-29

## 2019-08-14 RX ORDER — MIDAZOLAM HYDROCHLORIDE 1 MG/ML
INJECTION, SOLUTION INTRAMUSCULAR; INTRAVENOUS
Status: DISCONTINUED | OUTPATIENT
Start: 2019-08-14 | End: 2019-08-14

## 2019-08-14 RX ORDER — ACETAMINOPHEN 10 MG/ML
1000 INJECTION, SOLUTION INTRAVENOUS ONCE
Status: COMPLETED | OUTPATIENT
Start: 2019-08-14 | End: 2019-08-14

## 2019-08-14 RX ORDER — ROCURONIUM BROMIDE 10 MG/ML
INJECTION, SOLUTION INTRAVENOUS
Status: DISCONTINUED | OUTPATIENT
Start: 2019-08-14 | End: 2019-08-14

## 2019-08-14 RX ORDER — SODIUM CHLORIDE 9 MG/ML
INJECTION, SOLUTION INTRAVENOUS CONTINUOUS
Status: DISCONTINUED | OUTPATIENT
Start: 2019-08-14 | End: 2019-11-08

## 2019-08-14 RX ORDER — FENTANYL CITRATE 50 UG/ML
25 INJECTION, SOLUTION INTRAMUSCULAR; INTRAVENOUS EVERY 5 MIN PRN
Status: DISCONTINUED | OUTPATIENT
Start: 2019-08-14 | End: 2019-08-14 | Stop reason: HOSPADM

## 2019-08-14 RX ORDER — SODIUM CHLORIDE 9 MG/ML
INJECTION, SOLUTION INTRAVENOUS CONTINUOUS
Status: DISCONTINUED | OUTPATIENT
Start: 2019-08-14 | End: 2019-08-14 | Stop reason: HOSPADM

## 2019-08-14 RX ORDER — METOCLOPRAMIDE HYDROCHLORIDE 5 MG/ML
INJECTION INTRAMUSCULAR; INTRAVENOUS
Status: DISCONTINUED | OUTPATIENT
Start: 2019-08-14 | End: 2019-08-14

## 2019-08-14 RX ORDER — BUPIVACAINE HYDROCHLORIDE 5 MG/ML
INJECTION, SOLUTION PERINEURAL
Status: DISCONTINUED | OUTPATIENT
Start: 2019-08-14 | End: 2019-08-14 | Stop reason: HOSPADM

## 2019-08-14 RX ORDER — LIDOCAINE HYDROCHLORIDE AND EPINEPHRINE 5; 5 MG/ML; UG/ML
INJECTION, SOLUTION INFILTRATION; PERINEURAL
Status: DISCONTINUED | OUTPATIENT
Start: 2019-08-14 | End: 2019-08-14 | Stop reason: HOSPADM

## 2019-08-14 RX ORDER — SODIUM CHLORIDE, SODIUM LACTATE, POTASSIUM CHLORIDE, CALCIUM CHLORIDE 600; 310; 30; 20 MG/100ML; MG/100ML; MG/100ML; MG/100ML
INJECTION, SOLUTION INTRAVENOUS CONTINUOUS PRN
Status: DISCONTINUED | OUTPATIENT
Start: 2019-08-14 | End: 2019-08-14

## 2019-08-14 RX ORDER — OXYCODONE HYDROCHLORIDE 10 MG/1
10 TABLET ORAL EVERY 8 HOURS PRN
Qty: 30 TABLET | Refills: 0 | Status: SHIPPED | OUTPATIENT
Start: 2019-08-14 | End: 2019-08-14 | Stop reason: HOSPADM

## 2019-08-14 RX ORDER — HYDROCODONE BITARTRATE AND ACETAMINOPHEN 500; 5 MG/1; MG/1
TABLET ORAL
Status: DISCONTINUED | OUTPATIENT
Start: 2019-08-14 | End: 2019-08-14 | Stop reason: HOSPADM

## 2019-08-14 RX ORDER — NEOSTIGMINE METHYLSULFATE 1 MG/ML
INJECTION, SOLUTION INTRAVENOUS
Status: DISCONTINUED | OUTPATIENT
Start: 2019-08-14 | End: 2019-08-14

## 2019-08-14 RX ORDER — HYDRALAZINE HYDROCHLORIDE 20 MG/ML
5 INJECTION INTRAMUSCULAR; INTRAVENOUS ONCE
Status: COMPLETED | OUTPATIENT
Start: 2019-08-14 | End: 2019-08-14

## 2019-08-14 RX ORDER — PHENYLEPHRINE HYDROCHLORIDE 10 MG/ML
INJECTION INTRAVENOUS
Status: DISCONTINUED | OUTPATIENT
Start: 2019-08-14 | End: 2019-08-14

## 2019-08-14 RX ORDER — PROPOFOL 10 MG/ML
VIAL (ML) INTRAVENOUS
Status: DISCONTINUED | OUTPATIENT
Start: 2019-08-14 | End: 2019-08-14

## 2019-08-14 RX ORDER — SODIUM CHLORIDE 0.9 % (FLUSH) 0.9 %
3 SYRINGE (ML) INJECTION
Status: DISCONTINUED | OUTPATIENT
Start: 2019-08-14 | End: 2019-08-14 | Stop reason: HOSPADM

## 2019-08-14 RX ORDER — MUPIROCIN 20 MG/G
OINTMENT TOPICAL
Status: DISCONTINUED | OUTPATIENT
Start: 2019-08-14 | End: 2019-08-14

## 2019-08-14 RX ORDER — MUPIROCIN 20 MG/G
1 OINTMENT TOPICAL
Status: COMPLETED | OUTPATIENT
Start: 2019-08-14 | End: 2019-08-14

## 2019-08-14 RX ORDER — BACITRACIN 50000 [IU]/1
INJECTION, POWDER, FOR SOLUTION INTRAMUSCULAR
Status: DISCONTINUED | OUTPATIENT
Start: 2019-08-14 | End: 2019-08-14 | Stop reason: HOSPADM

## 2019-08-14 RX ORDER — SUCCINYLCHOLINE CHLORIDE 20 MG/ML
INJECTION INTRAMUSCULAR; INTRAVENOUS
Status: DISCONTINUED | OUTPATIENT
Start: 2019-08-14 | End: 2019-08-14

## 2019-08-14 RX ORDER — GLYCOPYRROLATE 0.2 MG/ML
INJECTION INTRAMUSCULAR; INTRAVENOUS
Status: DISCONTINUED | OUTPATIENT
Start: 2019-08-14 | End: 2019-08-14

## 2019-08-14 RX ADMIN — PHENYLEPHRINE HYDROCHLORIDE 100 MCG: 10 INJECTION INTRAVENOUS at 01:08

## 2019-08-14 RX ADMIN — ONDANSETRON 4 MG: 2 INJECTION, SOLUTION INTRAMUSCULAR; INTRAVENOUS at 02:08

## 2019-08-14 RX ADMIN — HYDROMORPHONE HYDROCHLORIDE 0.2 MG: 2 INJECTION, SOLUTION INTRAMUSCULAR; INTRAVENOUS; SUBCUTANEOUS at 03:08

## 2019-08-14 RX ADMIN — PROPOFOL 150 MG: 10 INJECTION, EMULSION INTRAVENOUS at 11:08

## 2019-08-14 RX ADMIN — HYDROMORPHONE HYDROCHLORIDE 0.2 MG: 2 INJECTION, SOLUTION INTRAMUSCULAR; INTRAVENOUS; SUBCUTANEOUS at 02:08

## 2019-08-14 RX ADMIN — ACETAMINOPHEN 1000 MG: 10 INJECTION, SOLUTION INTRAVENOUS at 02:08

## 2019-08-14 RX ADMIN — PHENYLEPHRINE HYDROCHLORIDE 50 MCG: 10 INJECTION INTRAVENOUS at 01:08

## 2019-08-14 RX ADMIN — ROCURONIUM BROMIDE 5 MG: 10 INJECTION, SOLUTION INTRAVENOUS at 11:08

## 2019-08-14 RX ADMIN — FENTANYL CITRATE 50 MCG: 50 INJECTION INTRAMUSCULAR; INTRAVENOUS at 11:08

## 2019-08-14 RX ADMIN — GLYCOPYRROLATE 0.4 MG: 0.2 INJECTION, SOLUTION INTRAMUSCULAR; INTRAVENOUS at 02:08

## 2019-08-14 RX ADMIN — NEOSTIGMINE METHYLSULFATE 2.5 MG: 1 INJECTION INTRAVENOUS at 02:08

## 2019-08-14 RX ADMIN — LIDOCAINE HYDROCHLORIDE 100 MG: 20 INJECTION, SOLUTION INTRAVENOUS at 11:08

## 2019-08-14 RX ADMIN — SODIUM CHLORIDE: 0.9 INJECTION, SOLUTION INTRAVENOUS at 11:08

## 2019-08-14 RX ADMIN — PROPOFOL 50 MG: 10 INJECTION, EMULSION INTRAVENOUS at 12:08

## 2019-08-14 RX ADMIN — GLYCOPYRROLATE 0.1 MG: 0.2 INJECTION, SOLUTION INTRAMUSCULAR; INTRAVENOUS at 11:08

## 2019-08-14 RX ADMIN — VANCOMYCIN HYDROCHLORIDE 1500 MG: 1.5 INJECTION, POWDER, LYOPHILIZED, FOR SOLUTION INTRAVENOUS at 11:08

## 2019-08-14 RX ADMIN — SUCCINYLCHOLINE CHLORIDE 120 MG: 20 INJECTION, SOLUTION INTRAMUSCULAR; INTRAVENOUS at 11:08

## 2019-08-14 RX ADMIN — Medication 25 MG: at 02:08

## 2019-08-14 RX ADMIN — METOCLOPRAMIDE 10 MG: 5 INJECTION, SOLUTION INTRAMUSCULAR; INTRAVENOUS at 11:08

## 2019-08-14 RX ADMIN — DIPHENHYDRAMINE HYDROCHLORIDE 25 MG: 50 INJECTION, SOLUTION INTRAMUSCULAR; INTRAVENOUS at 11:08

## 2019-08-14 RX ADMIN — Medication 20 MG: at 02:08

## 2019-08-14 RX ADMIN — HYDRALAZINE HYDROCHLORIDE 5 MG: 20 INJECTION INTRAMUSCULAR; INTRAVENOUS at 02:08

## 2019-08-14 RX ADMIN — SODIUM CHLORIDE: 0.9 INJECTION, SOLUTION INTRAVENOUS at 10:08

## 2019-08-14 RX ADMIN — MUPIROCIN 1 G: 20 OINTMENT TOPICAL at 10:08

## 2019-08-14 RX ADMIN — MIDAZOLAM HYDROCHLORIDE 2 MG: 1 INJECTION, SOLUTION INTRAMUSCULAR; INTRAVENOUS at 11:08

## 2019-08-14 RX ADMIN — PROPOFOL 50 MG: 10 INJECTION, EMULSION INTRAVENOUS at 11:08

## 2019-08-14 RX ADMIN — ROCURONIUM BROMIDE 20 MG: 10 INJECTION, SOLUTION INTRAVENOUS at 11:08

## 2019-08-14 RX ADMIN — PHENYLEPHRINE HYDROCHLORIDE 100 MCG: 10 INJECTION INTRAVENOUS at 12:08

## 2019-08-14 NOTE — INTERVAL H&P NOTE
The patient has been examined and the H&P has been reviewed:    I concur with the findings and no changes have occurred since H&P was written.    Anesthesia/Surgery risks, benefits and alternative options discussed and understood by patient/family.          Active Hospital Problems    Diagnosis  POA    Lumbar compression fracture [S32.000A]  Yes      Resolved Hospital Problems   No resolved problems to display.

## 2019-08-14 NOTE — DISCHARGE INSTRUCTIONS
Fall Prevention  Millions of people fall every year and injure themselves. You may have had anesthesia or sedation which may increase your risk of falling. You may have health issues that put you at an increased risk of falling.     Here are ways to reduce your risk of falling.  ·   · Make your home safe by keeping walkways clear of objects you may trip over.  · Use non-slip pads under rugs. Do not use area rugs or small throw rugs.  · Use non-slip mats in bathtubs and showers.  · Install handrails and lights on staircases.  · Do not walk in poorly lit areas.  · Do not stand on chairs or wobbly ladders.  · Use caution when reaching overhead or looking upward. This position can cause a loss of balance.  · Be sure your shoes fit properly, have non-slip bottoms and are in good condition.   · Wear shoes both inside and out. Avoid going barefoot or wearing slippers.  · Be cautious when going up and down stairs, curbs, and when walking on uneven sidewalks.  · If your balance is poor, consider using a cane or walker.  · If your fall was related to alcohol use, stop or limit alcohol intake.   · If your fall was related to use of sleeping medicines, talk to your doctor about this. You may need to reduce your dosage at bedtime if you awaken during the night to go to the bathroom.    · To reduce the need for nighttime bathroom trips:  ¨ Avoid drinking fluids for several hours before going to bed  ¨ Empty your bladder before going to bed  ¨ Men can keep a urinal at the bedside  · Stay as active as you can. Balance, flexibility, strength, and endurance all come from exercise. They all play a role in preventing falls. Ask your healthcare provider which types of activity are right for you.  · Get your vision checked on a regular basis.  · If you have pets, know where they are before you stand up or walk so you don't trip over them.  · Use night lights.

## 2019-08-14 NOTE — BRIEF OP NOTE
[facility]  Brief Operative Note    SUMMARY     Surgery Date: 8/14/2019     Surgeon(s) and Role:     * Jeremie Kwon DO - Primary    Assisting Surgeon: None    Pre-op Diagnosis:  Closed compression fracture of L2 lumbar vertebra with delayed healing, subsequent encounter [S32.020G]    Post-op Diagnosis:  Post-Op Diagnosis Codes:     * Closed compression fracture of L2 lumbar vertebra with delayed healing, subsequent encounter [S32.020G]    Procedure(s) (LRB):  L2 kyphoplasty Fluoro Globus (Bilateral)    Anesthesia: General    Description of Procedure: L2 kyphoplasty    Description of the findings of the procedure: see full op note     Estimated Blood Loss: * No values recorded between 8/14/2019 12:18 PM and 8/14/2019  2:29 PM *         Specimens:   Specimen (12h ago, onward)    None

## 2019-08-14 NOTE — OR NURSING
Positive movement and sensation to all extremities, equal hand grasp bilaterally.  Dressing to back dry and intact. Refill brisk, extremities warm to touch.

## 2019-08-14 NOTE — ANESTHESIA PREPROCEDURE EVALUATION
08/14/2019  Kathie Quezada is a 59 y.o., female.    Anesthesia Evaluation     I have reviewed the Nursing Notes.      Review of Systems  Anesthesia Hx:  No problems with previous Anesthesia   Social:  Non-Smoker    Hematology/Oncology:        Hematology Comments: Factor XI deficiency   Cardiovascular:   Denies Pacemaker. Hypertension Valvular problems/Murmurs, MR  CHF (one clinical episode in 1996) hyperlipidemia ECG has been reviewed. Echo: 2-2019  · Mild left atrial enlargement.  · Mild left ventricular enlargement.  · Normal left ventricular systolic function. The estimated ejection fraction is 55%  · Indeterminate left ventricular diastolic function.  · Normal right ventricular systolic function.  · Mild mitral regurgitation.  · Mild to moderate tricuspid regurgitation.  · The estimated PA systolic pressure is 32 mm Hg  · Normal central venous pressure (3 mm Hg).  · Trivial peircumferential pericardial effusion.   Pulmonary:  Pulmonary Normal    Renal/:   Chronic Renal Disease, CRI    Hepatic/GI:   No Bowel Prep. Denies PUD. GERD Liver Disease, Hepatitis, C    Neurological:   CVA, no residual symptoms Denies Seizures.    Endocrine:   Diabetes, type 2 Hypothyroidism        Physical Exam  General:  Obesity    Airway/Jaw/Neck:  AIRWAY FINDINGS: Normal      Chest/Lungs:  Chest/Lungs Clear    Heart/Vascular:  Heart Findings: Normal       Mental Status:  Mental Status Findings: Normal        Anesthesia Plan  Type of Anesthesia, risks & benefits discussed:  Anesthesia Type:  general  Patient's Preference:   Intra-op Monitoring Plan: standard ASA monitors  Intra-op Monitoring Plan Comments:   Post Op Pain Control Plan:   Post Op Pain Control Plan Comments:   Induction:   IV  Beta Blocker:  Patient is not currently on a Beta-Blocker (No further documentation required).       Informed Consent: Patient  understands risks and agrees with Anesthesia plan.  Questions answered. Anesthesia consent signed with patient.  ASA Score: 3     Day of Surgery Review of History & Physical:  There are no significant changes.  H&P update referred to the provider.     Anesthesia Plan Notes: 2 units FFP per pt's PCP        Ready For Surgery From Anesthesia Perspective.

## 2019-08-14 NOTE — TRANSFER OF CARE
"Anesthesia Transfer of Care Note    Patient: Kathie Quezada    Procedure(s) Performed: Procedure(s) (LRB):  L2 kyphoplasty Fluoro Globus (Bilateral)    Patient location: PACU    Anesthesia Type: general    Transport from OR: Transported from OR on room air with adequate spontaneous ventilation    Post pain: adequate analgesia    Post assessment: no apparent anesthetic complications and tolerated procedure well    Post vital signs: stable    Level of consciousness: awake, sedated and responds to stimulation    Nausea/Vomiting: no nausea/vomiting    Complications: none    Transfer of care protocol was followed      Last vitals:   Visit Vitals  BP (!) 198/95 (BP Location: Left arm, Patient Position: Lying)   Pulse 75   Temp 36.5 °C (97.7 °F) (Axillary)   Resp 10   Ht 5' 10" (1.778 m)   Wt 109.2 kg (240 lb 11.9 oz)   SpO2 100%   Breastfeeding? No   BMI 34.54 kg/m²     "

## 2019-08-15 ENCOUNTER — PATIENT MESSAGE (OUTPATIENT)
Dept: SLEEP MEDICINE | Facility: CLINIC | Age: 59
End: 2019-08-15

## 2019-08-15 NOTE — OP NOTE
DATE OF PROCEDURE:  8/14/19     PREOPERATIVE DIAGNOSES:  1.  Vertebral compression fracture at L2 (non-healing).  2.  Prior T12 kyphoplasty  3.  Severe thoracolumbar back pain     POSTOPERATIVE DIAGNOSES:  1.  Vertebral compression fracture at L2 (non-healing).  2.  Prior T12 kyphoplasty  3.  Severe thoracolumbar back pain     OPERATIONS PERFORMED:  1.  Bilateral transpedicular kyphoplasties at L2 (Globus system).  2.  Use of intraoperative biplane fluoroscopy.     ATTENDING:  Jeremie Kwon D.O.     FIRST ASSISTANT:  PERLITA Cole.     INDICATIONS FOR SURGERY:    58 yo F presented with refractory back pain after falling and was found to have an L2 compression fracture.  Pain persisted despite 8 weeks of bracing.  Follow up imaging demonstrated progression of the fracture and edema on MRI suggesting non-healing.  Patient had good response previous with a T12 kyphoplasty and requested another one.  Given that her pain did not improve with bracing and imaging was suspicious for non-healing fracture, decision was made to proceed with kyphoplasty.  Patient and family were in agreement and consents were obtained.  Risks, benefits and alternatives were   discussed.     PROCEDURE IN DETAIL:  The patient was correctly identified, taken to the   Operating Room where the Anesthesia team administered general endotracheal   anesthesia.  She was carefully rolled into the prone position over a Arsen   frame.  All pressure points are padded.  Prophylactic IV antibiotics were given.  Using biplane fluoroscopy, incisions were planned for the kyphoplasties at L2.  Once the incisions were marked, the area was prepped and draped in typical sterile fashion.  Next, the incisions were   infiltrated with local anesthetic.       At the left L2 level, a small stab incision was made at the   level of the pedicle along the lateral border.  Using both AP   and lateral fluoroscopy, a Jamshidi needle was docked at the 10 o'clock  position   of the pedicle and gently malleted into place.  Once confirmed in a good   position, the Jamshidi needle was advanced down the pedicle to the pedicle --   vertebral body junction, making sure that the Jamshidi needle was completely   contained within the pedicle.  Then the Jamshidi needle was gently advanced to   the middle of the vertebral body and left in place.  Next, the same exact   sequence of steps were performed for the right L2 pedicle.  Jamshidi needle was   started at the 2 o'clock position and gently advanced down the pedicle into the   center of the vertebral body under biplane guidance. Once all Jamshidi needles were in place, the kyphoplasty balloons were prepared and inserted into the center of the vertebral body and inflated to an   appropriate inflation pressure under fluoroscopic guidance and locked into   place.  The balloons were allowed to sit to create a cavity for the subsequent   cement.  After several minutes of allowing the balloons to remain in place, they   were then deflated and removed and cement was inserted into the center of the   vertebral body bilaterally through each Jamshidi needle at L2.  The   cement was carefully monitored under fluoroscopic guidance and various   volumes ranging from 3 mL to 5 mL were injected.  Cement was added until it was felt that   there was interdigitation of the cement and the trabecular bone within the   vertebral body.  Once the cement was in good place, the Jamshidi needles were   then removed.      Incisions were closed with a 3-o vicryl suture in dermal layer and steris on the skin.      COMPLICATIONS:  None.     ESTIMATED BLOOD LOSS:  50 mL.     INCISIONS:  Lumbar - bilateral stab incisions     WOUND CLASSIFICATION:  Clean.     DRAINS:  None.     CONDITION:  Stable.     PROGNOSIS:  Good.

## 2019-08-17 NOTE — DISCHARGE SUMMARY
[facility]  Neurosurgery  Discharge Summary      Patient Name: Kathie Quezada  MRN: [id number]  Admission Date: 8/14/2019  Hospital Length of Stay: 0 days  Discharge Date and Time: 8/14/2019  5:15 PM  Attending Physician: No att. providers found   Discharging Provider: Jeremie Kwon DO  Primary Care Provider: Elan Price MD     HPI: Progressive L2 compression fracture despite bracing and pain management    Procedure(s) (LRB):  L2 kyphoplasty Fluoro Globus (Bilateral)     Hospital Course: S/p L2 kyphoplasty.  No complications.  Uneventful recovery in PACU.  Stated pain was better after kypho and that she could move better.    Consults: None    Significant Diagnostic Studies: None    Pending Diagnostic Studies:     None        Final Active Diagnoses:    Diagnosis Date Noted POA    Lumbar compression fracture [S32.000A] 08/14/2019 Yes    S/P kyphoplasty [Z98.890] 08/14/2019 Not Applicable      Problems Resolved During this Admission:      Discharged Condition: good    Disposition: Home or Self Care    Follow Up:  Follow-up Information     Tomasa Dobson PA-C On 8/29/2019.    Specialty:  Neurosurgery  Why:  For wound check at 11am  Contact information:  [address] [facility] [address]  [address] [place] [id number]  [phone]                 Patient Instructions:      X-Ray Lumbar Spine AP And Lateral   Standing Status: Future Standing Exp. Date: 08/14/20   Order Comments: S/p L2 kyphoplasty  Standing xrays     Order Specific Question Answer Comments   May the Radiologist modify the order per protocol to meet the clinical needs of the patient? Yes      Diet diabetic     Lifting restrictions     Other restrictions (specify):     Notify your health care provider if you experience any of the following:  temperature >100.4     Notify your health care provider if you experience any of the following:  persistent nausea and vomiting or diarrhea     Notify your health care provider if you experience any  of the following:  severe uncontrolled pain     Notify your health care provider if you experience any of the following:  redness, tenderness, or signs of infection (pain, swelling, redness, odor or green/yellow discharge around incision site)     Notify your health care provider if you experience any of the following:  difficulty breathing or increased cough     Notify your health care provider if you experience any of the following:  severe persistent headache     Notify your health care provider if you experience any of the following:  worsening rash     Notify your health care provider if you experience any of the following:  persistent dizziness, light-headedness, or visual disturbances     Notify your health care provider if you experience any of the following:  increased confusion or weakness     Remove dressing in 48 hours     Activity as tolerated     Medications:  Reconciled Home Medications:      Medication List      START taking these medications    oxyCODONE 15 MG Tab  Commonly known as:  ROXICODONE  Take 1 tablet (15 mg total) by mouth every 6 (six) hours as needed for Pain.        CHANGE how you take these medications    polyethylene glycol 17 gram/dose powder  Commonly known as:  GLYCOLAX  Take 17 g by mouth once daily.  What changed:    · when to take this  · reasons to take this        CONTINUE taking these medications    amLODIPine 10 MG tablet  Commonly known as:  NORVASC  Take 1 tablet (10 mg total) by mouth once daily.     atorvastatin 40 MG tablet  Commonly known as:  LIPITOR  Take 1 tablet (40 mg total) by mouth every evening.     blood sugar diagnostic Strp  To check BG 1 times daily, to use with insurance preferred meter     carvedilol 25 MG tablet  Commonly known as:  COREG  Take 25 mg by mouth 2 (two) times daily with meals.     ergocalciferol 50,000 unit Cap  Commonly known as:  ERGOCALCIFEROL  Take 1 capsule (50,000 Units total) by mouth every 7 days.     escitalopram oxalate 10 MG  "tablet  Commonly known as:  LEXAPRO  Take 10 mg by mouth once daily.     lancets Misc  To check BG 1 times daily, to use with insurance preferred meter     levothyroxine 200 MCG tablet  Commonly known as:  SYNTHROID  Take 1 tablet (200 mcg total) by mouth before breakfast.     pantoprazole 40 MG tablet  Commonly known as:  PROTONIX  Take 1 tablet (40 mg total) by mouth once daily. FOR 30 DAYS     pen needle, diabetic 32 gauge x 5/32" Ndle  Commonly known as:  BD ULTRA-FINE OLGA LIDIA PEN NEEDLE  To use with Lantus solostar insulin pens     spironolactone 25 MG tablet  Commonly known as:  ALDACTONE  Take 1 tablet (25 mg total) by mouth once daily.     traZODone 50 MG tablet  Commonly known as:  DESYREL  Take 1 tablet (50 mg total) by mouth nightly as needed for Insomnia.     VISINE DRY EYE RELIEF OPHT  Apply 1 drop to eye 2 (two) times daily as needed (BOTH EYES).            Jeremie Kwon, DO  Neurosurgery  [facility]  "

## 2019-08-19 ENCOUNTER — PATIENT OUTREACH (OUTPATIENT)
Dept: ADMINISTRATIVE | Facility: OTHER | Age: 59
End: 2019-08-19

## 2019-08-19 NOTE — ANESTHESIA POSTPROCEDURE EVALUATION
Anesthesia Post Evaluation    Patient: Kathie Talleyelen Quezada    Procedure(s) Performed: Procedure(s) (LRB):  L2 kyphoplasty Fluoro Globus (Bilateral)    Final Anesthesia Type: general  Patient location during evaluation: PACU  Patient participation: Yes- Able to Participate  Level of consciousness: awake and alert  Post-procedure vital signs: reviewed and stable  Pain management: adequate  Airway patency: patent  PONV status at discharge: No PONV  Anesthetic complications: no      Cardiovascular status: blood pressure returned to baseline and hemodynamically stable  Respiratory status: unassisted and spontaneous ventilation  Hydration status: euvolemic  Follow-up not needed.            Event Time     Out of Recovery 15:48:00          Pain/Neda Score: No data recorded

## 2019-08-28 NOTE — PROGRESS NOTES
"Wound Check   Neurosurgery     Kathie Quezada is a 59 y.o. female who presents to clinic today for 2 week wound check, s/p L2 kyphoplasty with Dr. Kwon.  Denies fevers, chills, night sweats or N/V. Further denies wound drainage or swelling. Pt has been taking Roxicodone 15mg every other day with adequate relief. Pain today is 3/10 but patient is visibly uncomfortable. She reports pain on the inside, in her bone. Has been ambulating and driving short distances. Her daughter was concerned that she may be leaning to the right when she walks and wondered if she needs a cane. She denies any falls, though she does endorse one occasion where she was worried she would fall.       Physical Exam:   General: well developed, well nourished, no distress  Neurologic: Alert and oriented. Thought content appropriate.   GCS: Motor: 6/Verbal: 5/Eyes: 4 GCS Total: 15   Mental Status: Awake, Alert, Oriented x3   Cranial nerves: face symmetric, tongue midline, pupils equal, round, reactive to light with accomodation, EOMI.   Motor Strength: moves all extremities with good strength and tone   Sensation: response to light touch throughout  Walks with a slight lean to the right    Incision is clean, dry and intact with no signs of erythema, swelling or purulent drainage. Dissolvable sutures are intact. All skin edges are completely approximated.       Vitals:    08/29/19 1110   BP: (!) 152/74   Pulse: 70   Temp: 96.4 °F (35.8 °C)             Assessment/Plan:   Kathie Quezada is a 59 y.o. female who presents for 2 week wound check, s/p L2 kyphoplasty on 8/14/19 with Dr. Kwon. Neurologically intact on exam.     -Pain reported as 3/10 but patient visibly uncomfortable and has been "pushing through the pain" to conserve her medicine. Advised patient to start taking Mobic daily x2 weeks to help get through the acute post-op period. Prn thereafter. Also refilled roxicodone at a lower dose with instructions to reserve for severe " pain.   -Regarding gait concerns, she has never used a cane and has never had any falls. I do not believe it is necessary at this time. However, if she has additional episodes where she is fearful of falling, she should use a cane. Also offered official PT evaluation for functional capacity, gait evaluation. She preferred to wait at this time.   -Keep incision open to air   -Can shower and get incision wet, just pat dry and no vigorous scrubbing. Do not submerge incision for another 4 weeks.   -No lifting more than 10 lbs or excessive bending/twisting.   -Follow up with Neurosurgery in 4 weeks with lumbar xray   -Encouraged patient to call if they have any questions or concerns prior to next follow up appt        Tomasa Dobson PA-C  [facility]  Department of Neurosurgery  [phone]

## 2019-08-29 ENCOUNTER — OFFICE VISIT (OUTPATIENT)
Dept: NEUROSURGERY | Facility: CLINIC | Age: 59
End: 2019-08-29
Payer: MEDICARE

## 2019-08-29 VITALS
TEMPERATURE: 96 F | HEIGHT: 70 IN | SYSTOLIC BLOOD PRESSURE: 152 MMHG | BODY MASS INDEX: 33.17 KG/M2 | DIASTOLIC BLOOD PRESSURE: 74 MMHG | HEART RATE: 70 BPM | WEIGHT: 231.69 LBS

## 2019-08-29 DIAGNOSIS — M54.50 MIDLINE LOW BACK PAIN WITHOUT SCIATICA, UNSPECIFIED CHRONICITY: ICD-10-CM

## 2019-08-29 DIAGNOSIS — Z98.890 S/P KYPHOPLASTY: Primary | ICD-10-CM

## 2019-08-29 DIAGNOSIS — S32.020G CLOSED COMPRESSION FRACTURE OF L2 LUMBAR VERTEBRA WITH DELAYED HEALING, SUBSEQUENT ENCOUNTER: ICD-10-CM

## 2019-08-29 DIAGNOSIS — Z98.890 S/P KYPHOPLASTY: ICD-10-CM

## 2019-08-29 PROCEDURE — 99999 PR PBB SHADOW E&M-EST. PATIENT-LVL III: ICD-10-PCS | Mod: PBBFAC,,, | Performed by: PHYSICIAN ASSISTANT

## 2019-08-29 PROCEDURE — 99024 PR POST-OP FOLLOW-UP VISIT: ICD-10-PCS | Mod: POP,,, | Performed by: PHYSICIAN ASSISTANT

## 2019-08-29 PROCEDURE — 99024 POSTOP FOLLOW-UP VISIT: CPT | Mod: POP,,, | Performed by: PHYSICIAN ASSISTANT

## 2019-08-29 PROCEDURE — 99999 PR PBB SHADOW E&M-EST. PATIENT-LVL III: CPT | Mod: PBBFAC,,, | Performed by: PHYSICIAN ASSISTANT

## 2019-08-29 PROCEDURE — 99213 OFFICE O/P EST LOW 20 MIN: CPT | Mod: PBBFAC | Performed by: PHYSICIAN ASSISTANT

## 2019-08-29 RX ORDER — MELOXICAM 15 MG/1
15 TABLET ORAL DAILY
Qty: 30 TABLET | Refills: 0 | Status: SHIPPED | OUTPATIENT
Start: 2019-08-29 | End: 2019-08-29 | Stop reason: SDUPTHER

## 2019-08-29 RX ORDER — OXYCODONE HYDROCHLORIDE 5 MG/1
5 TABLET ORAL EVERY 12 HOURS PRN
Qty: 45 TABLET | Refills: 0 | Status: SHIPPED | OUTPATIENT
Start: 2019-08-29 | End: 2019-11-08

## 2019-08-29 RX ORDER — MELOXICAM 15 MG/1
TABLET ORAL
Qty: 30 TABLET | Refills: 0 | Status: SHIPPED | OUTPATIENT
Start: 2019-08-29 | End: 2019-11-08

## 2019-08-29 NOTE — PATIENT INSTRUCTIONS
-Keep incision open to air   -Can shower and get incision wet, just pat dry and no vigorous scrubbing. Do not submerge incision for another 4 weeks.   -No lifting more than 10 lbs or excessive bending/twisting.   -Follow up with Neurosurgery in 4 weeks with lumbar xray   -Mobic prescribed. Please take daily with meals for 2 weeks. After that, take daily as needed for pain  -oxycodone dose decreased. Prescription for Oxycodone 5mg printed. Take 1-2 tablets as needed for pain.       Please call with any questions or concerns prior to your next appointment.

## 2019-09-20 ENCOUNTER — PATIENT OUTREACH (OUTPATIENT)
Dept: ADMINISTRATIVE | Facility: OTHER | Age: 59
End: 2019-09-20

## 2019-09-24 ENCOUNTER — OFFICE VISIT (OUTPATIENT)
Dept: OPTOMETRY | Facility: CLINIC | Age: 59
End: 2019-09-24
Payer: MEDICARE

## 2019-09-24 ENCOUNTER — PATIENT MESSAGE (OUTPATIENT)
Dept: OPTOMETRY | Facility: CLINIC | Age: 59
End: 2019-09-24

## 2019-09-24 DIAGNOSIS — H52.13 MYOPIA WITH PRESBYOPIA OF BOTH EYES: ICD-10-CM

## 2019-09-24 DIAGNOSIS — H52.4 MYOPIA WITH PRESBYOPIA OF BOTH EYES: ICD-10-CM

## 2019-09-24 DIAGNOSIS — H04.123 DRY EYE SYNDROME OF BOTH EYES: ICD-10-CM

## 2019-09-24 DIAGNOSIS — E11.3213 BOTH EYES AFFECTED BY MILD NONPROLIFERATIVE DIABETIC RETINOPATHY WITH MACULAR EDEMA, ASSOCIATED WITH TYPE 2 DIABETES MELLITUS: Primary | ICD-10-CM

## 2019-09-24 PROCEDURE — 92014 COMPRE OPH EXAM EST PT 1/>: CPT | Mod: S$PBB,,, | Performed by: OPTOMETRIST

## 2019-09-24 PROCEDURE — 92014 PR EYE EXAM, EST PATIENT,COMPREHESV: ICD-10-PCS | Mod: S$PBB,,, | Performed by: OPTOMETRIST

## 2019-09-24 PROCEDURE — 99999 PR PBB SHADOW E&M-EST. PATIENT-LVL II: CPT | Mod: PBBFAC,,, | Performed by: OPTOMETRIST

## 2019-09-24 PROCEDURE — 92134 POSTERIOR SEGMENT OCT RETINA (OCULAR COHERENCE TOMOGRAPHY)-BOTH EYES: ICD-10-PCS | Mod: 26,S$PBB,, | Performed by: OPTOMETRIST

## 2019-09-24 PROCEDURE — 99212 OFFICE O/P EST SF 10 MIN: CPT | Mod: PBBFAC | Performed by: OPTOMETRIST

## 2019-09-24 PROCEDURE — 92134 CPTRZ OPH DX IMG PST SGM RTA: CPT | Mod: PBBFAC | Performed by: OPTOMETRIST

## 2019-09-24 PROCEDURE — 99999 PR PBB SHADOW E&M-EST. PATIENT-LVL II: ICD-10-PCS | Mod: PBBFAC,,, | Performed by: OPTOMETRIST

## 2019-09-24 NOTE — LETTER
September 24, 2019      Elan Price MD  [address]  [facility] [address]           [facility] - Optometry  [address]  [facility] [address]  Phone: [phone]  Fax: [phone]          Patient: Kathie Quezada   MR Number: [id number]   YOB: 1960   Date of Visit: 9/24/2019       Dear Dr. Elan Price:    Thank you for referring Kathie Quezada to me for evaluation. Attached you will find relevant portions of my assessment and plan of care.    If you have questions, please do not hesitate to call me. I look forward to following Kathie Quezada along with you.    Sincerely,    Sherin Santizo, OD    Enclosure  CC:  No Recipients    If you would like to receive this communication electronically, please contact [email or web address] or [phone] to request more information on [employer] access.    For providers and/or their staff who would like to refer a patient to [facility], please contact us through our one-stop-shop provider referral line, [facility] [place], at [phone].    If you feel you have received this communication in error or would no longer like to receive these types of communications, please e-mail [email or web address]

## 2019-09-24 NOTE — PROGRESS NOTES
HPI     Last eye exam was 5/2/18 with     Pt states she is having some blurry distance VA and occasionally near VA,   but thinks it is from all the medications shes on.   Pt states OD feels like something is in it, & started about 2-3 months   ago.  Pt only uses Visine OU PRN.  No eye sx.  Patient denies diplopia, headaches, flashes/floaters, and pain.    Hemoglobin A1C       Date                     Value               Ref Range             Status                05/25/2019               4.8                 4.0 - 5.6 %           Final                   Last edited by Lisandra Mukherjee on 9/24/2019  9:50 AM. (History)            Assessment /Plan     For exam results, see Encounter Report.    Both eyes affected by mild nonproliferative diabetic retinopathy with macular edema, associated with type 2 diabetes mellitus  -     OCT- Retina    Dry eye syndrome of both eyes    Myopia with presbyopia of both eyes            1.  DME R>>L.  Refer to retina for further evaluation.  2.  Recommend patient use artificial tears at least 2x/day OU.  3.  Continue w/ current rx

## 2019-09-26 ENCOUNTER — HOSPITAL ENCOUNTER (OUTPATIENT)
Dept: RADIOLOGY | Facility: HOSPITAL | Age: 59
Discharge: HOME | End: 2019-09-26
Attending: PHYSICIAN ASSISTANT
Payer: MEDICARE

## 2019-09-26 ENCOUNTER — OFFICE VISIT (OUTPATIENT)
Dept: NEUROSURGERY | Facility: CLINIC | Age: 59
End: 2019-09-26
Payer: MEDICARE

## 2019-09-26 VITALS
SYSTOLIC BLOOD PRESSURE: 180 MMHG | BODY MASS INDEX: 32.61 KG/M2 | WEIGHT: 227.75 LBS | HEART RATE: 75 BPM | HEIGHT: 70 IN | TEMPERATURE: 99 F | DIASTOLIC BLOOD PRESSURE: 73 MMHG | RESPIRATION RATE: 18 BRPM

## 2019-09-26 DIAGNOSIS — Z98.890 S/P KYPHOPLASTY: ICD-10-CM

## 2019-09-26 DIAGNOSIS — S32.020G CLOSED COMPRESSION FRACTURE OF L2 LUMBAR VERTEBRA WITH DELAYED HEALING, SUBSEQUENT ENCOUNTER: ICD-10-CM

## 2019-09-26 DIAGNOSIS — R26.9 GAIT DISTURBANCE: ICD-10-CM

## 2019-09-26 DIAGNOSIS — M54.6 MIDLINE THORACIC BACK PAIN, UNSPECIFIED CHRONICITY: ICD-10-CM

## 2019-09-26 DIAGNOSIS — S32.020D CLOSED COMPRESSION FRACTURE OF L2 LUMBAR VERTEBRA WITH ROUTINE HEALING, SUBSEQUENT ENCOUNTER: ICD-10-CM

## 2019-09-26 DIAGNOSIS — Z98.890 S/P KYPHOPLASTY: Primary | ICD-10-CM

## 2019-09-26 PROCEDURE — 72100 XR LUMBAR SPINE AP AND LATERAL: ICD-10-PCS | Mod: 26,,, | Performed by: RADIOLOGY

## 2019-09-26 PROCEDURE — 72100 X-RAY EXAM L-S SPINE 2/3 VWS: CPT | Mod: TC,FY

## 2019-09-26 PROCEDURE — 72100 X-RAY EXAM L-S SPINE 2/3 VWS: CPT | Mod: 26,,, | Performed by: RADIOLOGY

## 2019-09-26 PROCEDURE — 99214 OFFICE O/P EST MOD 30 MIN: CPT | Mod: PBBFAC,25 | Performed by: PHYSICIAN ASSISTANT

## 2019-09-26 PROCEDURE — 99999 PR PBB SHADOW E&M-EST. PATIENT-LVL IV: CPT | Mod: PBBFAC,,, | Performed by: PHYSICIAN ASSISTANT

## 2019-09-26 PROCEDURE — 99999 PR PBB SHADOW E&M-EST. PATIENT-LVL IV: ICD-10-PCS | Mod: PBBFAC,,, | Performed by: PHYSICIAN ASSISTANT

## 2019-09-26 PROCEDURE — 99024 PR POST-OP FOLLOW-UP VISIT: ICD-10-PCS | Mod: POP,,, | Performed by: PHYSICIAN ASSISTANT

## 2019-09-26 PROCEDURE — 99024 POSTOP FOLLOW-UP VISIT: CPT | Mod: POP,,, | Performed by: PHYSICIAN ASSISTANT

## 2019-09-26 NOTE — PROGRESS NOTES
Neurosurgery     Interval history 9/26/19:  Pt returns today for her 6 week postop evaluation, status post L2 kyphoplasty with Dr. Kwon.  Her incisions have healed well.  She has been taking Mobic p.r.n. and has drastically decrease the frequency of oxycodone.  She reports 3/10 pain today near her incisions.  She again appears visibly distressed 2/2 the pain.  She still feels like she is walking with a slight lean to the right and reports having increased pain when sitting up for long periods of time.  Denies any change to bowel or bladder function or new numbness.    HPI:  Kathie Quezada is a 59 y.o. female who presents to clinic today for 2 week wound check, s/p L2 kyphoplasty with Dr. Kwon.  Denies fevers, chills, night sweats or N/V. Further denies wound drainage or swelling. Pt has been taking Roxicodone 15mg every other day with adequate relief. Pain today is 3/10 but patient is visibly uncomfortable. She reports pain on the inside, in her bone. Has been ambulating and driving short distances. Her daughter was concerned that she may be leaning to the right when she walks and wondered if she needs a cane. She denies any falls, though she does endorse one occasion where she was worried she would fall.       Physical Exam:   General: well developed, well nourished, no distress  Neurologic: Alert and oriented. Thought content appropriate.   GCS: Motor: 6/Verbal: 5/Eyes: 4 GCS Total: 15   Mental Status: Awake, Alert, Oriented x3   Cranial nerves: face symmetric, tongue midline, pupils equal, round, reactive to light with accomodation, EOMI.   Motor Strength: moves all extremities with good strength and tone; full strength in b/l upper and lower extremities  Sensation: response to light touch throughout, slightly decreased b/l feet  Walks with a slight lean to the right    Incisions are well healed      Vitals:    09/26/19 1439   BP: (!) 180/73   Pulse: 75   Resp: 18   Temp: 98.8 °F (37.1 °C)      Imaging:  Xray lumbar spine 9/26/19  Post kyphoplasty changes of T12 and L2.  No new or worsening compression deformity.        Assessment/Plan:   Kathie Quezada is a 59 y.o. female who presents for 6 week post-op evaluation, s/p L2 kyphoplasty on 8/14/19 with Dr. Kwon. Neurologically intact on exam. She continues to endorse 3/10 mid back pain. She also continues to endorses problems with ambulation in which she is leaning to the right and feels unsteady on her feet.     -Xrays reviewed. Kyphoplasty appears stable with no protrusion of cement into the spinal canal or concern for compression of lumbar nerve roots. Unclear why the patient is exhibiting functional difficulties with ambulation. Appears to be due to generalized deconditioning, likely due to decreased activity as a result of pain from the compression fracture.   -Will refer the patient to physical therapy ([facility] in [facility]) for gait evaluation and strength and conditioning.   -Return to clinic in 6 weeks for 3 month post-op and to evaluate progress with PT.   -Encouraged patient to call if they have any questions or concerns prior to next follow up appt        Tomasa Dobson PA-C  [facility]  Department of Neurosurgery  [phone]

## 2019-10-08 ENCOUNTER — PATIENT OUTREACH (OUTPATIENT)
Dept: ADMINISTRATIVE | Facility: OTHER | Age: 59
End: 2019-10-08

## 2019-11-01 DIAGNOSIS — I50.32 CHRONIC DIASTOLIC HEART FAILURE: ICD-10-CM

## 2019-11-01 RX ORDER — TRAZODONE HYDROCHLORIDE 50 MG/1
50 TABLET ORAL NIGHTLY PRN
Qty: 90 TABLET | Refills: 3 | Status: SHIPPED | OUTPATIENT
Start: 2019-11-01 | End: 2019-11-08 | Stop reason: SDUPTHER

## 2019-11-01 RX ORDER — LEVOTHYROXINE SODIUM 200 UG/1
200 TABLET ORAL
Qty: 90 TABLET | Refills: 3 | Status: SHIPPED | OUTPATIENT
Start: 2019-11-01 | End: 2019-11-08 | Stop reason: SDUPTHER

## 2019-11-01 RX ORDER — SPIRONOLACTONE 25 MG/1
25 TABLET ORAL DAILY
Qty: 90 TABLET | Refills: 3 | Status: SHIPPED | OUTPATIENT
Start: 2019-11-01 | End: 2019-11-08 | Stop reason: SDUPTHER

## 2019-11-07 ENCOUNTER — OFFICE VISIT (OUTPATIENT)
Dept: NEUROSURGERY | Facility: CLINIC | Age: 59
End: 2019-11-07
Payer: MEDICARE

## 2019-11-07 VITALS
HEART RATE: 72 BPM | DIASTOLIC BLOOD PRESSURE: 84 MMHG | TEMPERATURE: 99 F | HEIGHT: 70 IN | BODY MASS INDEX: 34.9 KG/M2 | WEIGHT: 243.81 LBS | SYSTOLIC BLOOD PRESSURE: 175 MMHG

## 2019-11-07 DIAGNOSIS — Z98.890 S/P KYPHOPLASTY: ICD-10-CM

## 2019-11-07 DIAGNOSIS — S32.020G CLOSED COMPRESSION FRACTURE OF L2 LUMBAR VERTEBRA WITH DELAYED HEALING, SUBSEQUENT ENCOUNTER: Primary | ICD-10-CM

## 2019-11-07 PROCEDURE — 99024 POSTOP FOLLOW-UP VISIT: CPT | Mod: POP,,, | Performed by: NEUROLOGICAL SURGERY

## 2019-11-07 PROCEDURE — 99999 PR PBB SHADOW E&M-EST. PATIENT-LVL III: CPT | Mod: PBBFAC,,, | Performed by: NEUROLOGICAL SURGERY

## 2019-11-07 PROCEDURE — 99213 OFFICE O/P EST LOW 20 MIN: CPT | Mod: PBBFAC | Performed by: NEUROLOGICAL SURGERY

## 2019-11-07 PROCEDURE — 99024 PR POST-OP FOLLOW-UP VISIT: ICD-10-PCS | Mod: POP,,, | Performed by: NEUROLOGICAL SURGERY

## 2019-11-07 PROCEDURE — 99999 PR PBB SHADOW E&M-EST. PATIENT-LVL III: ICD-10-PCS | Mod: PBBFAC,,, | Performed by: NEUROLOGICAL SURGERY

## 2019-11-07 NOTE — PROGRESS NOTES
CHIEF COMPLAINT:  3 month f/u    HPI:  Kathie Quezada is a 59 y.o.-year-old female who presents today for post-operative follow-up s/p L2 kyphoplasty on 8/14/19.    She reports good improvement of her back pain and is more functional.  She has no new complaints and reports that she is focusing our attention on progressive kidney disease.    ROS:  As stated in the above HPI    PE:  Vitals:    11/07/19 1047   BP: (!) 175/84   Pulse: 72   Temp: 98.8 °F (37.1 °C)       AAOX3  NAD  CN 2-12 intact     Strength:      Deltoids Biceps Triceps Wrist Ext. Wrist Flex. Hand    RUE 5 5 5 5 5 5   LUE 5 5 5 5 5 5    Hip Flex. Knee Flex. Knee Ext. Dorsi Flex Plantar Flex EHL   RLE 5 5 5 5 5 5   LLE 5 5 5 5 5 5     Sensation:  Intact to light touch (All 4 extremities)  Intact to pin prick (All 4 extremities)    Gait:  normal    DTR:  2+ and symmetric knees and biceps    No hyperreflexia    Lumbar incision:  Well healed    IMAGING:  All imaging reviewed by me.    Lumbar xray, 9/26/19:  1. Good placement of cement in L2 and T12  2. No progression of fractures    ASSESSMENT:   S/p L2 kyphoplasty for compression fx and remote kypho at T12.  Back pain improved, more functional.    PLAN:   - RTC as needed

## 2019-11-08 ENCOUNTER — OFFICE VISIT (OUTPATIENT)
Dept: INTERNAL MEDICINE | Facility: CLINIC | Age: 59
End: 2019-11-08
Payer: MEDICARE

## 2019-11-08 ENCOUNTER — CLINICAL SUPPORT (OUTPATIENT)
Dept: INTERNAL MEDICINE | Facility: CLINIC | Age: 59
End: 2019-11-08
Payer: MEDICARE

## 2019-11-08 VITALS
OXYGEN SATURATION: 99 % | HEIGHT: 70 IN | DIASTOLIC BLOOD PRESSURE: 75 MMHG | WEIGHT: 247.81 LBS | HEART RATE: 75 BPM | SYSTOLIC BLOOD PRESSURE: 135 MMHG | BODY MASS INDEX: 35.48 KG/M2

## 2019-11-08 DIAGNOSIS — E55.9 VITAMIN D DEFICIENCY: ICD-10-CM

## 2019-11-08 DIAGNOSIS — S32.020G CLOSED COMPRESSION FRACTURE OF L2 LUMBAR VERTEBRA WITH DELAYED HEALING, SUBSEQUENT ENCOUNTER: ICD-10-CM

## 2019-11-08 DIAGNOSIS — N18.30 TYPE 2 DIABETES MELLITUS WITH STAGE 3 CHRONIC KIDNEY DISEASE, WITHOUT LONG-TERM CURRENT USE OF INSULIN: ICD-10-CM

## 2019-11-08 DIAGNOSIS — K21.9 GASTROESOPHAGEAL REFLUX DISEASE WITHOUT ESOPHAGITIS: ICD-10-CM

## 2019-11-08 DIAGNOSIS — F41.1 GENERALIZED ANXIETY DISORDER: ICD-10-CM

## 2019-11-08 DIAGNOSIS — N18.32 CKD STAGE G3B/A3, GFR 30-44 AND ALBUMIN CREATININE RATIO >300 MG/G: ICD-10-CM

## 2019-11-08 DIAGNOSIS — R80.9 NEPHROTIC RANGE PROTEINURIA: ICD-10-CM

## 2019-11-08 DIAGNOSIS — E78.2 MIXED HYPERLIPIDEMIA: ICD-10-CM

## 2019-11-08 DIAGNOSIS — I50.32 CHRONIC DIASTOLIC HEART FAILURE: Primary | ICD-10-CM

## 2019-11-08 DIAGNOSIS — D68.1 FACTOR XI DEFICIENCY: ICD-10-CM

## 2019-11-08 DIAGNOSIS — E11.22 TYPE 2 DIABETES MELLITUS WITH STAGE 3 CHRONIC KIDNEY DISEASE, WITHOUT LONG-TERM CURRENT USE OF INSULIN: ICD-10-CM

## 2019-11-08 DIAGNOSIS — E89.0 POSTOPERATIVE HYPOTHYROIDISM: ICD-10-CM

## 2019-11-08 DIAGNOSIS — I15.0 RENOVASCULAR HYPERTENSION: ICD-10-CM

## 2019-11-08 PROCEDURE — 99999 PR PBB SHADOW E&M-EST. PATIENT-LVL I: CPT | Mod: PBBFAC,,,

## 2019-11-08 PROCEDURE — 90686 IIV4 VACC NO PRSV 0.5 ML IM: CPT | Mod: PBBFAC

## 2019-11-08 PROCEDURE — 99999 PR PBB SHADOW E&M-EST. PATIENT-LVL IV: ICD-10-PCS | Mod: PBBFAC,,, | Performed by: INTERNAL MEDICINE

## 2019-11-08 PROCEDURE — 99211 OFF/OP EST MAY X REQ PHY/QHP: CPT | Mod: PBBFAC,27

## 2019-11-08 PROCEDURE — G0009 ADMIN PNEUMOCOCCAL VACCINE: HCPCS | Mod: PBBFAC

## 2019-11-08 PROCEDURE — 99214 PR OFFICE/OUTPT VISIT, EST, LEVL IV, 30-39 MIN: ICD-10-PCS | Mod: S$PBB,,, | Performed by: INTERNAL MEDICINE

## 2019-11-08 PROCEDURE — 99999 PR PBB SHADOW E&M-EST. PATIENT-LVL I: ICD-10-PCS | Mod: PBBFAC,,,

## 2019-11-08 PROCEDURE — 99214 OFFICE O/P EST MOD 30 MIN: CPT | Mod: S$PBB,,, | Performed by: INTERNAL MEDICINE

## 2019-11-08 PROCEDURE — 99214 OFFICE O/P EST MOD 30 MIN: CPT | Mod: PBBFAC,25 | Performed by: INTERNAL MEDICINE

## 2019-11-08 PROCEDURE — 99999 PR PBB SHADOW E&M-EST. PATIENT-LVL IV: CPT | Mod: PBBFAC,,, | Performed by: INTERNAL MEDICINE

## 2019-11-08 RX ORDER — CARVEDILOL 25 MG/1
25 TABLET ORAL 2 TIMES DAILY WITH MEALS
Qty: 180 TABLET | Refills: 3 | Status: SHIPPED | OUTPATIENT
Start: 2019-11-08 | End: 2020-01-08

## 2019-11-08 RX ORDER — AMLODIPINE BESYLATE 10 MG/1
10 TABLET ORAL DAILY
Qty: 90 TABLET | Refills: 3 | Status: SHIPPED | OUTPATIENT
Start: 2019-11-08 | End: 2020-08-25 | Stop reason: SDUPTHER

## 2019-11-08 RX ORDER — ATORVASTATIN CALCIUM 20 MG/1
40 TABLET, FILM COATED ORAL DAILY
Qty: 90 TABLET | Refills: 3 | Status: SHIPPED | OUTPATIENT
Start: 2019-11-08 | End: 2020-12-04 | Stop reason: SDUPTHER

## 2019-11-08 RX ORDER — SPIRONOLACTONE 25 MG/1
25 TABLET ORAL DAILY
Qty: 90 TABLET | Refills: 3 | Status: SHIPPED | OUTPATIENT
Start: 2019-11-08 | End: 2020-11-09

## 2019-11-08 RX ORDER — LEVOTHYROXINE SODIUM 200 UG/1
200 TABLET ORAL
Qty: 90 TABLET | Refills: 3 | Status: SHIPPED | OUTPATIENT
Start: 2019-11-08 | End: 2020-05-12

## 2019-11-08 RX ORDER — TRAZODONE HYDROCHLORIDE 50 MG/1
50 TABLET ORAL NIGHTLY PRN
Qty: 90 TABLET | Refills: 3 | Status: SHIPPED | OUTPATIENT
Start: 2019-11-08 | End: 2020-12-04 | Stop reason: SDUPTHER

## 2019-11-08 RX ORDER — ESCITALOPRAM OXALATE 10 MG/1
10 TABLET ORAL DAILY
Qty: 90 TABLET | Refills: 3 | Status: SHIPPED | OUTPATIENT
Start: 2019-11-08 | End: 2020-12-04 | Stop reason: SDUPTHER

## 2019-11-08 RX ORDER — PANTOPRAZOLE SODIUM 40 MG/1
40 TABLET, DELAYED RELEASE ORAL DAILY
Qty: 90 TABLET | Refills: 3 | Status: SHIPPED | OUTPATIENT
Start: 2019-11-08 | End: 2020-02-28 | Stop reason: SDUPTHER

## 2019-11-08 RX ORDER — ERGOCALCIFEROL 1.25 MG/1
50000 CAPSULE ORAL
Qty: 12 CAPSULE | Refills: 3 | Status: SHIPPED | OUTPATIENT
Start: 2019-11-08 | End: 2020-10-19 | Stop reason: SDUPTHER

## 2019-11-08 NOTE — PROGRESS NOTES
INTERNAL MEDICINE CLINIC  Follow-up Visit Progress Note     PRESENTING HISTORY     PCP: Elan Price MD    Last Clinic Visit with me:  7-1-2019    Current Chief Complaint/Problem:    Chief Complaint   Patient presents with    Follow-up       History of Present Illness & ROS: Ms. Kathie Quezada is a 59 y.o. female.    Doing well post kyphoplasty.    No chest pain or SOB.    Review of Systems:  Answers for HPI/ROS submitted by the patient on 11/8/2019   activity change: No  eye discharge: No  wheezing: No  chest pain: No  palpitations: No  constipation: No  vomiting: No  diarrhea: Yes  polydipsia: Yes  polyuria: Yes  difficulty urinating: No  hematuria: No  headaches: Yes  dysphoric mood: No      PAST HISTORY:     Past Medical History:   Diagnosis Date    Acute respiratory failure with hypoxia and hypercapnia 5/26/2019    ATN (acute tubular necrosis) 2/8/2019    Chronic diastolic heart failure 2/7/2019 2-8-19 Mild left atrial enlargement. Mild left ventricular enlargement. Normal left ventricular systolic function. The estimated ejection fraction is 55% Indeterminate left ventricular diastolic function. Normal right ventricular systolic function. Mild mitral regurgitation. Mild to moderate tricuspid regurgitation. 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  7-31-19 Factor XI Activity 55 - 145 % 96       Generalized anxiety disorder 11/8/2019    GERD (gastroesophageal reflux disease)     GIB (gastrointestinal bleeding) 10/18/2018    History of hepatitis C     s/p succesful Rx w/ [facility]  - [id number]  (cure) 2016    Mild nonproliferative diabetic retinopathy 05/02/2018    Mixed hyperlipidemia 4/9/2018    PNA (pneumonia) 5/28/2019    Postoperative hypothyroidism     Renovascular hypertension 4/9/2018    S/P kyphoplasty 8/14/2019 8/14/19: L2 kyphoplasty with Dr. Kwon     Slow transit constipation 5/27/2019    Type 2 diabetes mellitus with circulatory disorder, with long-term current use of insulin 4/9/2018    Type 2 diabetes mellitus with stage 3 chronic kidney disease, without long-term current use of insulin 4/9/2018       Past Surgical History:   Procedure Laterality Date    BACK SURGERY  2017    CHOLECYSTECTOMY  2015    [place]    COLONOSCOPY  11/13/2015    Dr Chino torres prep. internal hemorrhoids. diverticulosis of sigmois. NO polyps. repeat due 2025    FIXATION KYPHOPLASTY Bilateral 8/14/2019    Procedure: L2 kyphoplasty Fluoro Globus;  Surgeon: Jeremie Kwon DO;  Location: [facility] OR;  Service: Neurosurgery;  Laterality: Bilateral;  GLOBUS MACRINA WINSLOW [phone] TEXTED HER @ 10:54AM ON 7-30-19  PRE-OP-BY RN  8-7-2019    HYSTERECTOMY      OOPHORECTOMY      one    TH[facility]ROID[facility]  2016    [facility] nodule    TONSILLECTOMY         Family History   Problem Relation Age of Onset    Diabetes type II Mother        Social History     Socioeconomic History    Marital status:      Spouse name: Not on file    Number of children: Not on file    Years of education: Not on file    Highest education level: Not on file   Occupational History    Not on file   Social Needs    Financial resource strain: Somewhat hard    Food insecurity:     Worry: Sometimes true     Inability: Sometimes true    Transportation needs:     Medical: No     Non-medical: No   Tobacco Use    Smoking status: Never Smoker    Smokeless tobacco: Never Used   Substance and Sexual Activity    Alcohol use: Yes     Alcohol/week: 4.0 standard drinks     Types: 4 Cans of beer per week     Frequency: Monthly  or less     Drinks per session: 1 or 2     Binge frequency: Never     Comment: weekly    Drug use: No    Sexual activity: Not on file   Lifestyle    Physical activity:     Days per week: Not on file     Minutes per session: 30 min    Stress: Not at all   Relationships    Social connections:     Talks on phone: More than three times a week     Gets together: More than three times a week     Attends Scientologist service: Not on file     Active member of club or organization: Yes     Attends meetings of clubs or organizations: More than 4 times per year     Relationship status:    Other Topics Concern    Not on file   Social History Narrative    Pt enjoys cooking and baking and making Floral arrangement and Kerline De Lis        MEDICATIONS & ALLERGIES:     Current Outpatient Medications on File Prior to Visit   Medication Sig Dispense Refill    amLODIPine (NORVASC) 10 MG tablet Take 1 tablet (10 mg total) by mouth once daily. 30 tablet 11    atorvastatin (LIPITOR) 40 MG tablet Take 1 tablet (40 mg total) by mouth every evening.      blood sugar diagnostic Strp To check BG 1 times daily, to use with insurance preferred meter 35 strip 11    carvedilol (COREG) 25 MG tablet Take 25 mg by mouth 2 (two) times daily with meals.       ergocalciferol (ERGOCALCIFEROL) 50,000 unit Cap Take 1 capsule (50,000 Units total) by mouth every 7 days. 12 capsule 3    escitalopram oxalate (LEXAPRO) 10 MG tablet Take 10 mg by mouth once daily.      lancets Misc To check BG 1 times daily, to use with insurance preferred meter 50 each 11    levothyroxine (SYNTHROID) 200 MCG tablet Take 1 tablet (200 mcg total) by mouth before breakfast. 90 tablet 3                  pantoprazole (PROTONIX) 40 MG tablet Take 1 tablet (40 mg total) by mouth once daily. FOR 30 DAYS 90 tablet 0    peg 400/hypromellose/glycerin (VISINE DRY EYE RELIEF OPHT) Apply 1 drop to eye 2 (two) times daily as needed (BOTH EYES).      pen needle, diabetic  "(BD ULTRA-FINE OLGA LIDIA PEN NEEDLE) 32 gauge x 5/32" Ndle To use with Lantus solostar insulin pens 100 each 3    polyethylene glycol (GLYCOLAX) 17 gram/dose powder Take 17 g by mouth once daily. (Patient taking differently: Take 17 g by mouth daily as needed. ) 1 Bottle 3    spironolactone (ALDACTONE) 25 MG tablet Take 1 tablet (25 mg total) by mouth once daily. 90 tablet 3    traZODone (DESYREL) 50 MG tablet Take 1 tablet (50 mg total) by mouth nightly as needed for Insomnia. 90 tablet 3       Review of patient's allergies indicates:   Allergen Reactions    Mushroom Hives    Bactrim [sulfamethoxazole-trimethoprim] Hives    Codeine Hives    Gabapentin      Pruritis     Iodine     Penicillins Hives    Shellfish containing products Itching    Sulfa (sulfonamide antibiotics) Hives       Medications Reconciliation:   I have reconciled the patient's home medications and discharge medications with the patient/family. I have updated all changes.  Refer to After-Visit Medication List.    OBJECTIVE:     Vital Signs:  Vitals:    11/08/19 1553   BP: 135/75   Pulse: 75     Wt Readings from Last 1 Encounters:   11/08/19 1523 112.4 kg (247 lb 12.8 oz)     Body mass index is 35.56 kg/m².     Physical Exam:  General: Well developed, well nourished. No distress.  HEENT: Head is normocephalic, atraumatic   Eyes: Clear conjunctiva.   Neck: Supple, symmetrical neck; trachea midline.  Lungs: Clear to auscultation bilaterally and normal respiratory effort.  Cardiovascular: Heart with regular rate and rhythm.    Extremities: Trace pedal LE edema.    Abdomen: Abdomen is soft, non-tender non-distended with normal bowel sounds.  Skin: Skin color, texture, turgor normal. No rashes.  Musculoskeletal: Normal gait.   Lymph Nodes: No cervical or supraclavicular adenopathy.  Neurologic: Normal strength and tone. No focal numbness or weakness.   Psychiatric: Normal affect. Alert.    Laboratory  Lab Results   Component Value Date    WBC 10.66 " 08/07/2019    HGB 11.3 (L) 08/07/2019    HCT 35.3 (L) 08/07/2019     08/07/2019    CHOL 213 (H) 10/18/2018    TRIG 109 10/18/2018    HDL 76 (H) 10/18/2018    ALT 10 05/28/2019    AST 10 05/28/2019     08/07/2019    K 4.4 08/07/2019     08/07/2019    CREATININE 2.4 (H) 08/07/2019    BUN 36 (H) 08/07/2019    CO2 25 08/07/2019    TSH 0.262 (L) 05/26/2019    INR 1.0 08/07/2019    HGBA1C 4.8 05/25/2019       ASSESSMENT & PLAN:     Chronic diastolic heart failure  CKD stage G3b/A3, GFR 30-44 and albumin creatinine ratio >300 mg/g  Nephrotic range proteinuria  Renovascular hypertension  - Compensated. BP controlled.          On amlodipine 10 mg daily, Coreg 25 mg BID.    spironolactone (ALDACTONE) 25 MG tablet; Take 1 tablet (25 mg total) by mouth once daily.       Vitamin D deficiency  -     ergocalciferol (ERGOCALCIFEROL) 50,000 unit Cap; Take 1 capsule (50,000 Units total) by mouth every 7 days.       Gastroesophageal reflux disease without esophagitis  -     pantoprazole (PROTONIX) 40 MG tablet; Take 1 tablet (40 mg total) by mouth once daily.  Dispense: 90 tablet; Refill: 3    Postoperative hypothyroidism  -     levothyroxine (SYNTHROID) 200 MCG tablet; Take 1 tablet (200 mcg total) by mouth before breakfast.       Type 2 diabetes mellitus with stage 3 chronic kidney disease, without long-term current use of insulin  - Diet controlled. A1c 4.8     Mixed hyperlipidemia  - On Lipitor 20 mg daily.     Vitamin D Deficiency  - On Vitamin D 50,000 weekly.    Normocytic anemia  -   Improved. Normal B12 and iron.     Generalized anxiety disorder  -     escitalopram oxalate (LEXAPRO) 10 MG tablet; Take 1 tablet (10 mg total) by mouth once daily.  Dispense: 90 tablet; Refill: 3  -     traZODone (DESYREL) 50 MG tablet; Take 1 tablet (50 mg total) by mouth nightly as needed for Insomnia.  Dispense: 90 tablet; Refill: 3    Closed compression fracture of L2 lumbar vertebra s/p L2 kyphoplasty  - Doing well post  procedure.     Factor XI deficiency  - Usually get  FFP 2 units right before surgery for Factor XI deficiency.   Ref. Range 7/31/2019 17:14   Factor XI Activity Latest Ref Range: 55 - 145 % 96        Preventive Health Maintenance:  Podiatry and Optometry referral  - Shingrix, Flu, Prevnar Rx given to the patient.     Refilled all meds for 1 year this visit    Return to Clinic for Follow Up with me:  Jaime with labs before.    Scheduled Follow-up   Future Appointments   Date Time Provider Department Center   11/21/2019 10:45 AM Sherin Santizo OD [facility]       After Visit Medication List :     Medication List           Accurate as of November 8, 2019  3:57 PM. If you have any questions, ask your nurse or doctor.               CHANGE how you take these medications    atorvastatin 20 MG tablet  Commonly known as:  LIPITOR  Take 2 tablets (40 mg total) by mouth once daily.  What changed:    · medication strength  · when to take this  Changed by:  Elan Price MD     pantoprazole 40 MG tablet  Commonly known as:  PROTONIX  Take 1 tablet (40 mg total) by mouth once daily.  What changed:  additional instructions  Changed by:  Elan Price MD        CONTINUE taking these medications    amLODIPine 10 MG tablet  Commonly known as:  NORVASC  Take 1 tablet (10 mg total) by mouth once daily.     blood sugar diagnostic Strp  To check BG 1 times daily, to use with insurance preferred meter     carvedilol 25 MG tablet  Commonly known as:  COREG  Take 1 tablet (25 mg total) by mouth 2 (two) times daily with meals.     ergocalciferol 50,000 unit Cap  Commonly known as:  ERGOCALCIFEROL  Take 1 capsule (50,000 Units total) by mouth every 7 days.     escitalopram oxalate 10 MG tablet  Commonly known as:  LEXAPRO  Take 1 tablet (10 mg total) by mouth once daily.     lancets Misc  To check BG 1 times daily, to use with insurance preferred meter     levothyroxine 200 MCG tablet  Commonly known as:  SYNTHROID  Take 1 tablet  "(200 mcg total) by mouth before breakfast.     spironolactone 25 MG tablet  Commonly known as:  ALDACTONE  Take 1 tablet (25 mg total) by mouth once daily.     traZODone 50 MG tablet  Commonly known as:  DESYREL  Take 1 tablet (50 mg total) by mouth nightly as needed for Insomnia.     VISINE DRY EYE RELIEF OPHT        STOP taking these medications    meloxicam 15 MG tablet  Commonly known as:  MOBIC  Stopped by:  Elan Price MD     oxyCODONE 5 MG immediate release tablet  Commonly known as:  ROXICODONE  Stopped by:  Elan Price MD     pen needle, diabetic 32 gauge x 5/32" Ndle  Commonly known as:  BD ULTRA-FINE OLGA LIDIA PEN NEEDLE  Stopped by:  Elan Price MD     polyethylene glycol 17 gram/dose powder  Commonly known as:  GLYCOLAX  Stopped by:  Elan Price MD           Where to Get Your Medications      [id number] [address] [address]    Phone:  [phone]   · amLODIPine 10 MG tablet  · atorvastatin 20 MG tablet  · carvedilol 25 MG tablet  · ergocalciferol 50,000 unit Cap  · escitalopram oxalate 10 MG tablet  · levothyroxine 200 MCG tablet  · pantoprazole 40 MG tablet  · spironolactone 25 MG tablet  · traZODone 50 MG tablet         Signing Physician:  Elan Price MD  "

## 2019-11-14 DIAGNOSIS — S32.020G CLOSED COMPRESSION FRACTURE OF L2 LUMBAR VERTEBRA WITH DELAYED HEALING, SUBSEQUENT ENCOUNTER: ICD-10-CM

## 2019-11-14 DIAGNOSIS — Z98.890 S/P KYPHOPLASTY: ICD-10-CM

## 2019-11-14 DIAGNOSIS — M54.50 MIDLINE LOW BACK PAIN WITHOUT SCIATICA, UNSPECIFIED CHRONICITY: ICD-10-CM

## 2019-11-14 RX ORDER — MELOXICAM 15 MG/1
TABLET ORAL
Qty: 30 TABLET | Refills: 0 | OUTPATIENT
Start: 2019-11-14

## 2019-12-09 ENCOUNTER — PATIENT OUTREACH (OUTPATIENT)
Dept: ADMINISTRATIVE | Facility: OTHER | Age: 59
End: 2019-12-09

## 2019-12-10 ENCOUNTER — OFFICE VISIT (OUTPATIENT)
Dept: OPHTHALMOLOGY | Facility: CLINIC | Age: 59
End: 2019-12-10
Payer: MEDICARE

## 2019-12-10 VITALS — HEART RATE: 66 BPM | DIASTOLIC BLOOD PRESSURE: 89 MMHG | SYSTOLIC BLOOD PRESSURE: 154 MMHG

## 2019-12-10 DIAGNOSIS — H35.033 HYPERTENSIVE RETINOPATHY, BILATERAL: ICD-10-CM

## 2019-12-10 DIAGNOSIS — H25.13 NS (NUCLEAR SCLEROSIS), BILATERAL: ICD-10-CM

## 2019-12-10 DIAGNOSIS — E11.3213 CONTROLLED TYPE 2 DIABETES MELLITUS WITH BOTH EYES AFFECTED BY MILD NONPROLIFERATIVE RETINOPATHY AND MACULAR EDEMA, WITHOUT LONG-TERM CURRENT USE OF INSULIN: Primary | ICD-10-CM

## 2019-12-10 PROCEDURE — 92226 PR SPECIAL EYE EXAM, SUBSEQUENT: ICD-10-PCS | Mod: 50,S$PBB,, | Performed by: OPHTHALMOLOGY

## 2019-12-10 PROCEDURE — 99999 PR PBB SHADOW E&M-EST. PATIENT-LVL III: ICD-10-PCS | Mod: PBBFAC,,, | Performed by: OPHTHALMOLOGY

## 2019-12-10 PROCEDURE — 99213 OFFICE O/P EST LOW 20 MIN: CPT | Mod: PBBFAC,25 | Performed by: OPHTHALMOLOGY

## 2019-12-10 PROCEDURE — 92014 PR EYE EXAM, EST PATIENT,COMPREHESV: ICD-10-PCS | Mod: S$PBB,,, | Performed by: OPHTHALMOLOGY

## 2019-12-10 PROCEDURE — 99999 PR PBB SHADOW E&M-EST. PATIENT-LVL III: CPT | Mod: PBBFAC,,, | Performed by: OPHTHALMOLOGY

## 2019-12-10 PROCEDURE — 92014 COMPRE OPH EXAM EST PT 1/>: CPT | Mod: S$PBB,,, | Performed by: OPHTHALMOLOGY

## 2019-12-10 PROCEDURE — 92226 PR SPECIAL EYE EXAM, SUBSEQUENT: CPT | Mod: 50,PBBFAC | Performed by: OPHTHALMOLOGY

## 2019-12-10 PROCEDURE — 92226 PR SPECIAL EYE EXAM, SUBSEQUENT: CPT | Mod: 50,S$PBB,, | Performed by: OPHTHALMOLOGY

## 2019-12-10 RX ORDER — FLUORESCEIN 500 MG/ML
5 INJECTION INTRAVENOUS ONCE
Status: COMPLETED | OUTPATIENT
Start: 2019-12-10 | End: 2020-02-11

## 2019-12-10 NOTE — PROGRESS NOTES
HPI     Referred by Dr. Santizo for Fluid OD. Patient denies flashes, floaters,   diplopia, photophobia, glare, HA's, or pain.    No gtts    LBS: 115 yesterday pm  Hemoglobin A1C       Date                     Value               Ref Range             Status                05/25/2019               4.8                 4.0 - 5.6 %           Final                   01/31/2019               5.4                 4.0 - 5.6 %           Final                   01/14/2019               5.0                 4.0 - 5.6 %           Final                ----------      Last edited by Sierra Fan on 12/10/2019 10:35 AM. (History)        OCT - DME OD improving  OS - slight increase in parafoveal ME      A/P    1. Mild NPDR OU  T2 controlled without insulin    2. DME OD>OS  Some improvement OD since 9/19    Will follow closely    3. HTN Ret OU    BS/BP/cholc ontrol    4. Early NS OU      2 months OCT/[place] FA OD

## 2019-12-10 NOTE — LETTER
December 10, 2019      Sherin Santizo, OD  [address]  [facility] [address]           [facility]  [address]  [facility] [address]  Phone: [phone]  Fax: [phone]          Patient: Kathie Quezada   MR Number: [id number]   YOB: 1960   Date of Visit: 12/10/2019       Dear Dr. Sherin Santizo:    Thank you for referring Kathie Quezada to me for evaluation. Attached you will find relevant portions of my assessment and plan of care.    If you have questions, please do not hesitate to call me. I look forward to following Kathie Quezada along with you.    Sincerely,    SOFYA Reina MD    Enclosure  CC:  No Recipients    If you would like to receive this communication electronically, please contact [email or web address] or [phone] to request more information on [employer] Link access.    For providers and/or their staff who would like to refer a patient to [facility], please contact us through our one-stop-shop provider referral line, [facility], at [phone].    If you feel you have received this communication in error or would no longer like to receive these types of communications, please e-mail [email or web address]

## 2020-01-08 ENCOUNTER — OFFICE VISIT (OUTPATIENT)
Dept: INTERNAL MEDICINE | Facility: CLINIC | Age: 60
End: 2020-01-08
Payer: MEDICARE

## 2020-01-08 ENCOUNTER — IMMUNIZATION (OUTPATIENT)
Dept: PHARMACY | Facility: CLINIC | Age: 60
End: 2020-01-08
Payer: COMMERCIAL

## 2020-01-08 VITALS
DIASTOLIC BLOOD PRESSURE: 88 MMHG | BODY MASS INDEX: 35.18 KG/M2 | OXYGEN SATURATION: 98 % | SYSTOLIC BLOOD PRESSURE: 134 MMHG | WEIGHT: 245.13 LBS | HEART RATE: 86 BPM

## 2020-01-08 DIAGNOSIS — N18.30 TYPE 2 DIABETES MELLITUS WITH STAGE 3 CHRONIC KIDNEY DISEASE, WITHOUT LONG-TERM CURRENT USE OF INSULIN: ICD-10-CM

## 2020-01-08 DIAGNOSIS — Z98.890 S/P KYPHOPLASTY: ICD-10-CM

## 2020-01-08 DIAGNOSIS — E11.22 TYPE 2 DIABETES MELLITUS WITH STAGE 3 CHRONIC KIDNEY DISEASE, WITHOUT LONG-TERM CURRENT USE OF INSULIN: ICD-10-CM

## 2020-01-08 DIAGNOSIS — E78.2 MIXED HYPERLIPIDEMIA: ICD-10-CM

## 2020-01-08 DIAGNOSIS — I15.0 RENOVASCULAR HYPERTENSION: ICD-10-CM

## 2020-01-08 DIAGNOSIS — I50.32 CHRONIC DIASTOLIC HEART FAILURE: Primary | ICD-10-CM

## 2020-01-08 DIAGNOSIS — K21.9 GASTROESOPHAGEAL REFLUX DISEASE WITHOUT ESOPHAGITIS: ICD-10-CM

## 2020-01-08 DIAGNOSIS — R80.9 NEPHROTIC RANGE PROTEINURIA: ICD-10-CM

## 2020-01-08 DIAGNOSIS — S32.020G CLOSED COMPRESSION FRACTURE OF L2 LUMBAR VERTEBRA WITH DELAYED HEALING, SUBSEQUENT ENCOUNTER: ICD-10-CM

## 2020-01-08 DIAGNOSIS — D68.1 FACTOR XI DEFICIENCY: ICD-10-CM

## 2020-01-08 DIAGNOSIS — E55.9 VITAMIN D DEFICIENCY: ICD-10-CM

## 2020-01-08 DIAGNOSIS — E89.0 POSTOPERATIVE HYPOTHYROIDISM: ICD-10-CM

## 2020-01-08 DIAGNOSIS — N18.32 CKD STAGE G3B/A3, GFR 30-44 AND ALBUMIN CREATININE RATIO >300 MG/G: ICD-10-CM

## 2020-01-08 DIAGNOSIS — F41.1 GENERALIZED ANXIETY DISORDER: ICD-10-CM

## 2020-01-08 PROCEDURE — 99213 OFFICE O/P EST LOW 20 MIN: CPT | Mod: PBBFAC | Performed by: INTERNAL MEDICINE

## 2020-01-08 PROCEDURE — 99999 PR PBB SHADOW E&M-EST. PATIENT-LVL III: CPT | Mod: PBBFAC,HCNC,, | Performed by: INTERNAL MEDICINE

## 2020-01-08 PROCEDURE — 99999 PR PBB SHADOW E&M-EST. PATIENT-LVL III: ICD-10-PCS | Mod: PBBFAC,HCNC,, | Performed by: INTERNAL MEDICINE

## 2020-01-08 PROCEDURE — 99214 OFFICE O/P EST MOD 30 MIN: CPT | Mod: HCNC,S$GLB,, | Performed by: INTERNAL MEDICINE

## 2020-01-08 PROCEDURE — 99214 PR OFFICE/OUTPT VISIT, EST, LEVL IV, 30-39 MIN: ICD-10-PCS | Mod: HCNC,S$GLB,, | Performed by: INTERNAL MEDICINE

## 2020-01-08 RX ORDER — METOPROLOL SUCCINATE 100 MG/1
100 TABLET, EXTENDED RELEASE ORAL DAILY
Qty: 90 TABLET | Refills: 3 | Status: SHIPPED | OUTPATIENT
Start: 2020-01-08 | End: 2020-06-23

## 2020-01-08 NOTE — PROGRESS NOTES
INTERNAL MEDICINE CLINIC  Follow-up Visit Progress Note     PRESENTING HISTORY     PCP: Elan Price MD    Last Clinic Visit with me:  11-8-2019    Current Chief Complaint/Problem:    Chief Complaint   Patient presents with    Follow-up     History of Present Illness & ROS: Ms. Kathie Quezada is a 59 y.o. female.    She stopped Coreg due to diarrhea.  She stopped it for 3 months.    She has mild left side pain.  Recently had compression fracture.    PAST HISTORY:     Past Medical History:   Diagnosis Date    Acute respiratory failure with hypoxia and hypercapnia 5/26/2019    ATN (acute tubular necrosis) 2/8/2019    Chronic diastolic heart failure 2/7/2019 2-8-19 Mild left atrial enlargement. Mild left ventricular enlargement. Normal left ventricular systolic function. The estimated ejection fraction is 55% Indeterminate left ventricular diastolic function. Normal right ventricular systolic function. Mild mitral regurgitation. Mild to moderate tricuspid regurgitation. 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  7-31-19 Factor XI Activity 55 - 145 % 96       Generalized anxiety disorder 11/8/2019    GERD (gastroesophageal reflux disease)     GIB (gastrointestinal bleeding) 10/18/2018    History of hepatitis C     s/p succesful Rx w/ [facility]  - SVR12 (cure) 2016    Mild nonproliferative diabetic retinopathy 05/02/2018    Mixed hyperlipidemia 4/9/2018    NS (nuclear sclerosis), bilateral 12/10/2019    PNA (pneumonia) 5/28/2019    Postoperative hypothyroidism     Renovascular hypertension  4/9/2018    S/P kyphoplasty 8/14/2019 8/14/19: L2 kyphoplasty with Dr. Kwon     Slow transit constipation 5/27/2019    Type 2 diabetes mellitus with circulatory disorder, with long-term current use of insulin 4/9/2018    Type 2 diabetes mellitus with stage 3 chronic kidney disease, without long-term current use of insulin 4/9/2018       Past Surgical History:   Procedure Laterality Date    BACK SURGERY  2017    CHOLECYSTECTOMY  2015    [facility]    COLONOSCOPY  11/13/2015    Dr Chino torres prep. internal hemorrhoids. diverticulosis of sigmois. NO polyps. repeat due 2025    FIXATION KYPHOPLASTY Bilateral 8/14/2019    Procedure: L2 kyphoplasty Fluoro Globus;  Surgeon: Jeremie Kwon DO;  Location: [facility] OR;  Service: Neurosurgery;  Laterality: Bilateral;  GLOBUS MACRINA WINSLOW [phone] TEXTED HER @ 10:54AM ON 7-30-19  PRE-OP-BY RN  8-7-2019    HYSTERECTOMY      OOPHORECTOMY      one    THYROIDECTOMY  2016    at [facility] for thyroid nodule    TONSILLECTOMY         Family History   Problem Relation Age of Onset    Diabetes type II Mother        Social History     Socioeconomic History    Marital status:      Spouse name: Not on file    Number of children: Not on file    Years of education: Not on file    Highest education level: Not on file   Occupational History    Not on file   Social Needs    Financial resource strain: Somewhat hard    Food insecurity:     Worry: Sometimes true     Inability: Sometimes true    Transportation needs:     Medical: No     Non-medical: No   Tobacco Use    Smoking status: Never Smoker    Smokeless tobacco: Never Used   Substance and Sexual Activity    Alcohol use: Yes     Alcohol/week: 4.0 standard drinks     Types: 4 Cans of beer per week     Frequency: Monthly or less     Drinks per session: 1 or 2     Binge frequency: Never     Comment: weekly    Drug use: No    Sexual activity: Not on file   Lifestyle    Physical activity:     Days per week: Not on  file     Minutes per session: 30 min    Stress: Not at all   Relationships    Social connections:     Talks on phone: More than three times a week     Gets together: More than three times a week     Attends Qu[employer] service: Not on file     Active member of club or organization: Yes     Attends meetings of clubs or organizations: More than 4 times per year     Relationship status:    Other Topics Concern    Not on file   Social History Narrative    Pt enjoys cooking and baking and making Floral arrangement and Kerline [employer]        MEDICATIONS & ALLERGIES:     Current Outpatient Medications on File Prior to Visit   Medication Sig Dispense Refill    amLODIPine (NORVASC) 10 MG tablet Take 1 tablet (10 mg total) by mouth once daily. 90 tablet 3    atorvastatin (LIPITOR) 20 MG tablet Take 2 tablets (40 mg total) by mouth once daily. 90 tablet 3    blood sugar diagnostic Strp To check BG 1 times daily, to use with insurance preferred meter 35 strip 11    carvedilol (COREG) 25 MG tablet Take 1 tablet (25 mg total) by mouth 2 (two) times daily with meals. 180 tablet 3    ergocalciferol (ERGOCALCIFEROL) 50,000 unit Cap Take 1 capsule (50,000 Units total) by mouth every 7 days. 12 capsule 3    escitalopram oxalate (LEXAPRO) 10 MG tablet Take 1 tablet (10 mg total) by mouth once daily. 90 tablet 3    lancets Misc To check BG 1 times daily, to use with insurance preferred meter 50 each 11    levothyroxine (SYNTHROID) 200 MCG tablet Take 1 tablet (200 mcg total) by mouth before breakfast. 90 tablet 3    pantoprazole (PROTONIX) 40 MG tablet Take 1 tablet (40 mg total) by mouth once daily. 90 tablet 3    peg 400/hypromellose/glycerin (VISINE DRY EYE RELIEF OPHT) Apply 1 drop to eye 2 (two) times daily as needed (BOTH EYES).      spironolactone (ALDACTONE) 25 MG tablet Take 1 tablet (25 mg total) by mouth once daily. 90 tablet 3    traZODone (DESYREL) 50 MG tablet Take 1 tablet (50 mg total) by mouth nightly  as needed for Insomnia. 90 tablet 3       Review of patient's allergies indicates:   Allergen Reactions    Mushroom Hives    Coreg [carvedilol]      diarrhea    Bactrim [sulfamethoxazole-trimethoprim] Hives    Codeine Hives    Gabapentin      Pruritis     Iodine     Penicillins Hives    Shellfish containing products Itching    Sulfa (sulfonamide antibiotics) Hives       Medications Reconciliation:   I have reconciled the patient's home medications and discharge medications with the patient/family. I have updated all changes.  Refer to After-Visit Medication List.    OBJECTIVE:     Vital Signs:  Vitals:    01/08/20 1112   BP: 134/88   Pulse: 86     Wt Readings from Last 1 Encounters:   01/08/20 1112 111.2 kg (245 lb 2.4 oz)     Body mass index is 35.18 kg/m².     Physical Exam:  General: Well developed, well nourished. No distress.  HEENT: Head is normocephalic, atraumatic; ears are normal.    Eyes: Clear conjunctiva.  Neck: Supple, symmetrical neck; trachea midline.  Lungs: Clear to auscultation bilaterally and normal respiratory effort.  Cardiovascular: Heart with regular rate and rhythm.    Extremities: No LE edema.    Abdomen: Abdomen is soft, non-tender non-distended with normal bowel sounds.  Skin: Skin color, texture, turgor normal. No rashes.  Musculoskeletal: Normal gait.   Psychiatric: Normal affect. Alert.    Laboratory  Lab Results   Component Value Date    WBC 10.66 08/07/2019    HGB 11.3 (L) 08/07/2019    HCT 35.3 (L) 08/07/2019     08/07/2019    CHOL 213 (H) 10/18/2018    TRIG 109 10/18/2018    HDL 76 (H) 10/18/2018    ALT 10 05/28/2019    AST 10 05/28/2019     08/07/2019    K 4.4 08/07/2019     08/07/2019    CREATININE 2.4 (H) 08/07/2019    BUN 36 (H) 08/07/2019    CO2 25 08/07/2019    TSH 0.262 (L) 05/26/2019    INR 1.0 08/07/2019    HGBA1C 4.8 05/25/2019       ASSESSMENT & PLAN:     Chronic diastolic heart failure  CKD stage G3b/A3, GFR 30-44 and albumin creatinine ratio  >300 mg/g  Nephrotic range proteinuria  Renovascular hypertension  - Compensated. BP controlled. She stopped Coreg due to diarrhea.    Will add Toprol  mg daily.          On amlodipine 10 mg daily     On spironolactone (ALDACTONE) 25 MG tablet; Take 1 tablet (25 mg total) by mouth once daily.       Rx:  -     metoprolol succinate (TOPROL-XL) 100 MG 24 hr tablet; Take 1 tablet (100 mg total) by mouth once daily.  Dispense: 90 tablet; Refill: 3      Vitamin D deficiency  -     ergocalciferol (ERGOCALCIFEROL) 50,000 unit Cap; Take 1 capsule (50,000 Units total) by mouth every 7 days.       Gastroesophageal reflux disease without esophagitis  -     pantoprazole (PROTONIX) 40 MG tablet; Take 1 tablet (40 mg total) by mouth once daily.      Postoperative hypothyroidism  -     levothyroxine (SYNTHROID) 200 MCG tablet; Take 1 tablet (200 mcg total) by mouth before breakfast.       Type 2 diabetes mellitus with stage 3 chronic kidney disease, without long-term current use of insulin  - Diet controlled. A1c 4.8     Mixed hyperlipidemia  - On Lipitor 20 mg daily.     Vitamin D Deficiency  - On Vitamin D 50,000 weekly.     Normocytic anemia  -   Improved. Normal B12 and iron.     Generalized anxiety disorder  -     escitalopram oxalate (LEXAPRO) 10 MG tablet; Take 1 tablet (10 mg total) by mouth once daily.    -     traZODone (DESYREL) 50 MG tablet; Take 1 tablet (50 mg total) by mouth nightly as needed for Insomnia.       Closed compression fracture of L2 lumbar vertebra s/p L2 kyphoplasty  - Doing well post procedure.     Factor XI deficiency  - Usually get  FFP 2 units right before surgery for Factor XI deficiency.    Ref. Range 7/31/2019 17:14   Factor XI Activity Latest Ref Range: 55 - 145 % 96        Preventive Health Maintenance:  - Shingrix      Return to Clinic for Follow Up with me:  5 months.  Blood test tomorrow.    Scheduled Follow-up :  Future Appointments   Date Time Provider Department Center   1/9/2020   9:00 AM LAB, APPOINTMENT [facility] LAB IM Cj Fuentes PCW   2/11/2020  9:40 AM SOFYA Reina MD [facility] OPHTHAL Cj Dill       After Visit Medication List :     Medication List           Accurate as of January 8, 2020 11:23 AM. If you have any questions, ask your nurse or doctor.               START taking these medications    metoprolol succinate 100 MG 24 hr tablet  Commonly known as:  TOPROL-XL  Take 1 tablet (100 mg total) by mouth once daily.        CONTINUE taking these medications    amLODIPine 10 MG tablet  Commonly known as:  NORVASC  Take 1 tablet (10 mg total) by mouth once daily.     atorvastatin 20 MG tablet  Commonly known as:  LIPITOR  Take 2 tablets (40 mg total) by mouth once daily.     blood sugar diagnostic Strp  To check BG 1 times daily, to use with insurance preferred meter     ergocalciferol 50,000 unit Cap  Commonly known as:  ERGOCALCIFEROL  Take 1 capsule (50,000 Units total) by mouth every 7 days.     escitalopram oxalate 10 MG tablet  Commonly known as:  LEXAPRO  Take 1 tablet (10 mg total) by mouth once daily.     lancets Misc  To check BG 1 times daily, to use with insurance preferred meter     levothyroxine 200 MCG tablet  Commonly known as:  SYNTHROID  Take 1 tablet (200 mcg total) by mouth before breakfast.     pantoprazole 40 MG tablet  Commonly known as:  PROTONIX  Take 1 tablet (40 mg total) by mouth once daily.     spironolactone 25 MG tablet  Commonly known as:  ALDACTONE  Take 1 tablet (25 mg total) by mouth once daily.     traZODone 50 MG tablet  Commonly known as:  DESYREL  Take 1 tablet (50 mg total) by mouth nightly as needed for Insomnia.     VISINE DRY EYE RELIEF OPHT        STOP taking these medications    carvedilol 25 MG tablet  Commonly known as:  COREG           Where to Get Your Medications      These medications were sent to [id number] - [place] - [address] AT [facility]  [address] [facility]  [address]    Phone:  [phone]   · metoprolol succinate 100 MG 24 hr tablet         Signing Physician:  Elan Price MD

## 2020-01-09 ENCOUNTER — LAB VISIT (OUTPATIENT)
Dept: LAB | Facility: HOSPITAL | Age: 60
End: 2020-01-09
Attending: INTERNAL MEDICINE
Payer: MEDICARE

## 2020-01-09 DIAGNOSIS — E55.9 VITAMIN D DEFICIENCY: ICD-10-CM

## 2020-01-09 DIAGNOSIS — E11.22 TYPE 2 DIABETES MELLITUS WITH STAGE 3 CHRONIC KIDNEY DISEASE, WITHOUT LONG-TERM CURRENT USE OF INSULIN: ICD-10-CM

## 2020-01-09 DIAGNOSIS — E89.0 POSTOPERATIVE HYPOTHYROIDISM: ICD-10-CM

## 2020-01-09 DIAGNOSIS — N18.30 TYPE 2 DIABETES MELLITUS WITH STAGE 3 CHRONIC KIDNEY DISEASE, WITHOUT LONG-TERM CURRENT USE OF INSULIN: ICD-10-CM

## 2020-01-09 DIAGNOSIS — I50.32 CHRONIC DIASTOLIC HEART FAILURE: ICD-10-CM

## 2020-01-09 DIAGNOSIS — E78.2 MIXED HYPERLIPIDEMIA: ICD-10-CM

## 2020-01-09 LAB
25(OH)D3+25(OH)D2 SERPL-MCNC: 26 NG/ML (ref 30–96)
ALBUMIN SERPL BCP-MCNC: 3.6 G/DL (ref 3.5–5.2)
ALP SERPL-CCNC: 117 U/L (ref 55–135)
ALT SERPL W/O P-5'-P-CCNC: 22 U/L (ref 10–44)
ANION GAP SERPL CALC-SCNC: 9 MMOL/L (ref 8–16)
AST SERPL-CCNC: 18 U/L (ref 10–40)
BASOPHILS # BLD AUTO: 0.06 K/UL (ref 0–0.2)
BASOPHILS NFR BLD: 0.4 % (ref 0–1.9)
BILIRUB SERPL-MCNC: 0.5 MG/DL (ref 0.1–1)
BUN SERPL-MCNC: 38 MG/DL (ref 6–20)
CALCIUM SERPL-MCNC: 7.9 MG/DL (ref 8.7–10.5)
CHLORIDE SERPL-SCNC: 110 MMOL/L (ref 95–110)
CHOLEST SERPL-MCNC: 218 MG/DL (ref 120–199)
CHOLEST/HDLC SERPL: 2.4 {RATIO} (ref 2–5)
CO2 SERPL-SCNC: 24 MMOL/L (ref 23–29)
CREAT SERPL-MCNC: 2.3 MG/DL (ref 0.5–1.4)
DIFFERENTIAL METHOD: ABNORMAL
EOSINOPHIL # BLD AUTO: 0.2 K/UL (ref 0–0.5)
EOSINOPHIL NFR BLD: 1.4 % (ref 0–8)
ERYTHROCYTE [DISTWIDTH] IN BLOOD BY AUTOMATED COUNT: 15.8 % (ref 11.5–14.5)
EST. GFR  (AFRICAN AMERICAN): 26 ML/MIN/1.73 M^2
EST. GFR  (NON AFRICAN AMERICAN): 23 ML/MIN/1.73 M^2
ESTIMATED AVG GLUCOSE: 117 MG/DL (ref 68–131)
GLUCOSE SERPL-MCNC: 102 MG/DL (ref 70–110)
HBA1C MFR BLD HPLC: 5.7 % (ref 4–5.6)
HCT VFR BLD AUTO: 33.4 % (ref 37–48.5)
HDLC SERPL-MCNC: 91 MG/DL (ref 40–75)
HDLC SERPL: 41.7 % (ref 20–50)
HGB BLD-MCNC: 11 G/DL (ref 12–16)
LDLC SERPL CALC-MCNC: 105.8 MG/DL (ref 63–159)
LYMPHOCYTES # BLD AUTO: 3.3 K/UL (ref 1–4.8)
LYMPHOCYTES NFR BLD: 20.5 % (ref 18–48)
MCH RBC QN AUTO: 27.5 PG (ref 27–31)
MCHC RBC AUTO-ENTMCNC: 32.9 G/DL (ref 32–36)
MCV RBC AUTO: 84 FL (ref 82–98)
MONOCYTES # BLD AUTO: 1.1 K/UL (ref 0.3–1)
MONOCYTES NFR BLD: 6.8 % (ref 4–15)
NEUTROPHILS # BLD AUTO: 11.2 K/UL (ref 1.8–7.7)
NEUTROPHILS NFR BLD: 70.9 % (ref 38–73)
NONHDLC SERPL-MCNC: 127 MG/DL
PLATELET # BLD AUTO: 168 K/UL (ref 150–350)
PMV BLD AUTO: 10.4 FL (ref 9.2–12.9)
POTASSIUM SERPL-SCNC: 4.5 MMOL/L (ref 3.5–5.1)
PROT SERPL-MCNC: 7.6 G/DL (ref 6–8.4)
RBC # BLD AUTO: 4 M/UL (ref 4–5.4)
SODIUM SERPL-SCNC: 143 MMOL/L (ref 136–145)
T4 FREE SERPL-MCNC: 1.1 NG/DL (ref 0.71–1.51)
TRIGL SERPL-MCNC: 106 MG/DL (ref 30–150)
TSH SERPL DL<=0.005 MIU/L-ACNC: 4.05 UIU/ML (ref 0.4–4)
WBC # BLD AUTO: 16.03 K/UL (ref 3.9–12.7)

## 2020-01-09 PROCEDURE — 36415 COLL VENOUS BLD VENIPUNCTURE: CPT

## 2020-01-09 PROCEDURE — 83036 HEMOGLOBIN GLYCOSYLATED A1C: CPT

## 2020-01-09 PROCEDURE — 80053 COMPREHEN METABOLIC PANEL: CPT

## 2020-01-09 PROCEDURE — 85025 COMPLETE CBC W/AUTO DIFF WBC: CPT

## 2020-01-09 PROCEDURE — 84443 ASSAY THYROID STIM HORMONE: CPT

## 2020-01-09 PROCEDURE — 80061 LIPID PANEL: CPT

## 2020-01-09 PROCEDURE — 82306 VITAMIN D 25 HYDROXY: CPT

## 2020-01-09 PROCEDURE — 84439 ASSAY OF FREE THYROXINE: CPT

## 2020-01-17 ENCOUNTER — PATIENT MESSAGE (OUTPATIENT)
Dept: PHARMACY | Facility: CLINIC | Age: 60
End: 2020-01-17

## 2020-02-11 ENCOUNTER — OFFICE VISIT (OUTPATIENT)
Dept: OPHTHALMOLOGY | Facility: CLINIC | Age: 60
End: 2020-02-11
Payer: MEDICARE

## 2020-02-11 VITALS — SYSTOLIC BLOOD PRESSURE: 176 MMHG | HEART RATE: 71 BPM | DIASTOLIC BLOOD PRESSURE: 92 MMHG

## 2020-02-11 DIAGNOSIS — H35.033 HYPERTENSIVE RETINOPATHY, BILATERAL: ICD-10-CM

## 2020-02-11 DIAGNOSIS — E11.3213 CONTROLLED TYPE 2 DIABETES MELLITUS WITH BOTH EYES AFFECTED BY MILD NONPROLIFERATIVE RETINOPATHY AND MACULAR EDEMA, WITHOUT LONG-TERM CURRENT USE OF INSULIN: Primary | ICD-10-CM

## 2020-02-11 PROCEDURE — 99999 PR PBB SHADOW E&M-EST. PATIENT-LVL III: ICD-10-PCS | Mod: PBBFAC,HCNC,, | Performed by: OPHTHALMOLOGY

## 2020-02-11 PROCEDURE — 92014 COMPRE OPH EXAM EST PT 1/>: CPT | Mod: 25,HCNC,S$GLB, | Performed by: OPHTHALMOLOGY

## 2020-02-11 PROCEDURE — 67028 INJECTION EYE DRUG: CPT | Mod: HCNC,RT,S$GLB, | Performed by: OPHTHALMOLOGY

## 2020-02-11 PROCEDURE — 92235 FLUORESCEIN ANGIOGRAPHY - OU - BOTH EYES: ICD-10-PCS | Mod: HCNC,S$GLB,, | Performed by: OPHTHALMOLOGY

## 2020-02-11 PROCEDURE — 92235 FLUORESCEIN ANGRPH MLTIFRAME: CPT | Mod: HCNC,S$GLB,, | Performed by: OPHTHALMOLOGY

## 2020-02-11 PROCEDURE — 99999 PR PBB SHADOW E&M-EST. PATIENT-LVL III: CPT | Mod: PBBFAC,HCNC,, | Performed by: OPHTHALMOLOGY

## 2020-02-11 PROCEDURE — 92134 CPTRZ OPH DX IMG PST SGM RTA: CPT | Mod: HCNC,S$GLB,, | Performed by: OPHTHALMOLOGY

## 2020-02-11 PROCEDURE — 67028 PR INJECT INTRAVITREAL PHARMCOLOGIC: ICD-10-PCS | Mod: HCNC,RT,S$GLB, | Performed by: OPHTHALMOLOGY

## 2020-02-11 PROCEDURE — 92014 PR EYE EXAM, EST PATIENT,COMPREHESV: ICD-10-PCS | Mod: 25,HCNC,S$GLB, | Performed by: OPHTHALMOLOGY

## 2020-02-11 PROCEDURE — 92134 POSTERIOR SEGMENT OCT RETINA (OCULAR COHERENCE TOMOGRAPHY)-BOTH EYES: ICD-10-PCS | Mod: HCNC,S$GLB,, | Performed by: OPHTHALMOLOGY

## 2020-02-11 RX ADMIN — Medication 1.25 MG: at 12:02

## 2020-02-11 RX ADMIN — FLUORESCEIN 500 MG: 500 INJECTION INTRAVENOUS at 11:02

## 2020-02-11 NOTE — PROGRESS NOTES
HPI     DLS: 12/10/2019 Dr. Reina    Patient here for [place] FA. Patient states her vision has been a little   blurry since her last visit. She c/o itching and FBS OD.       OCT - DME OD increased  OS - slight increase in parafoveal ME    [facility] FA - peripheral NP and dropout OU  Late macular leakage OU      A/P    1. Mod NPDR OU  T2 controlled without insulin    2. DME OD>OS    2/20 increase in DME    Avastin OD today    3. HTN Ret OU    BS/BP/cholc ontrol    4. Early NS OU      1 month OCT    Risks, benefits, and alternatives to treatment discussed in detail with the patient.  The patient voiced understanding and wished to proceed with the procedure    Injection Procedure Note:  Diagnosis: DME OD    Patient Identified and Time Out complete  Pt Prefers to be marked with sticker rather than ink marker (OHS.Qual.[email or web address] #5)  Topical Proparacaine and Betadine.  Inject Avastin OD at 6:00 @ 3.5-4mm posterior to limbus  Post Operative Dx: Same  Complications: None  Follow up as above.

## 2020-02-11 NOTE — PATIENT INSTRUCTIONS
Fluorescein Angiography     A fluorescein angiogram of the retina   Fluorescein angiography is an eye test. It is done to look at the back of your eye, including:  · The blood vessels in your eye  · The layer of tissue at the back of your eye (the retina)  · The center of your retina (the macula)  · The optic nerve  This test can diagnose diseases found in these areas. It can also diagnose other conditions that affect these areas. To do this test, a dye called fluorescein is shot (injected) into your arm. The dye goes into your bloodstream and up into the blood vessels in your eyes. A special camera is then used to take images (angiograms) of your eyes.  Getting ready for your test  Tell your healthcare provider if you:  · Are pregnant or think you may be pregnant  · Are breastfeeding  · Have a history of severe allergic reactions, including to X-ray dye or other medicines  · Have kidney problems  Tell your provider about any medicines you are taking. You may need to stop taking all or some of these before the test. This includes:  · All prescription medicines  · Over-the-counter medicines such as aspirin or ibuprofen  · Street drugs  · Herbs, vitamins, and other supplements  You should arrange for an adult family member or friend to drive you home after your test. Your vision will be blurry for up to 12 hours.  Follow any other instructions from your healthcare provider.  During your test  · You are given eye drops to enlarge (dilate) your pupils.  · You then sit in front of a special camera. You place your chin on the chin rest and look into the camera.  · Images are taken of your eyes, one eye at a time.  · Fluorescein dye is then injected into your arm. The lights in the room are turned off. You may have mild nausea. You may have a warm feeling in your arm or upper body. Tell your provider if your skin feels itchy or if you are having trouble breathing. If so, you could be having an allergic reaction to the  dye.  · More pictures of your eyes are taken over 15 to 30 minutes. The camera shines a bright light into your eyes. Try to keep your head still and your eyes open.  · When enough images have been taken, the test is over.  After your test  Your vision will be blurry for up to 4 to 12 hours. This is because of your dilated pupils. Your eye will be more sensitive to light for up to 12 hours. You may want to wear sunglasses during this time. Do not drive if your vision is very blurry. You may also find it uncomfortable to read. Your skin may look yellow for a few hours. This is from the dye. Your urine will be bright yellow or orange for 24 to 48 hours after the test.     Risks and possible complications  All procedures have some risks. Possible risks of fluorescein angiography include:  · Upset stomach (nausea) and vomiting  · Leaking dye around the injection site that causes pain and swelling  · Metallic taste in your mouth  · Infection at injection site  · Allergic reaction to the dye  · Dry mouth or too much saliva  · Faster heart rate  · Sweating  · Lower back pain   Date Last Reviewed: 5/30/2015  © 6339-3374 [employer]. [address] [place] [address]. All rights reserved. This information is not intended as a substitute for professional medical care. Always follow your healthcare professional's instructions.      .POST INTRAVITREAL INJECTION PATIENT INSTRUCTIONS   Below are some guidelines for what to expect after your treatment and additional care instructions.   * you may experience mild discomfort in your eye after the treatment. Your eye usually feels better within 24 to 48 hours after the procedure.   * you have been given drops that numb the surface of your eye.   DO NOT rub or touch your eye, DO NOT wear contacts until numbness goes away.   * you may experience small spots that appear in your field of vision, these are usually caused by an air bubble or from the medication. It  taked a few hours or days for these to go away.   * use of sunglasses will help reduce light sensitivity and glare.   * DO NOT swim   for at least 3 hours after the injection   * If you have a gritty, burning, irritating or stinging feeling in the injected eye. This may be a result of the antiseptic used. Use artifical tears or eye lubricant ( from over- the- counter from your local pharmacy ). If you have some at home already please check the expiration date, so not to use anything contaminated in your eye. A cool pack over your eye brow above the injected eye may also relieve discomfort.   Call us right away or go to the Emergency Department if you have a dramatic decrease in vision or extreme pain in the eye.   [facility] [phone]

## 2020-02-27 DIAGNOSIS — K21.9 GASTROESOPHAGEAL REFLUX DISEASE WITHOUT ESOPHAGITIS: ICD-10-CM

## 2020-02-28 RX ORDER — PANTOPRAZOLE SODIUM 40 MG/1
40 TABLET, DELAYED RELEASE ORAL DAILY
Qty: 90 TABLET | Refills: 3 | Status: SHIPPED | OUTPATIENT
Start: 2020-02-28 | End: 2021-03-03

## 2020-03-17 ENCOUNTER — PROCEDURE VISIT (OUTPATIENT)
Dept: OPHTHALMOLOGY | Facility: CLINIC | Age: 60
End: 2020-03-17
Payer: MEDICARE

## 2020-03-17 VITALS — SYSTOLIC BLOOD PRESSURE: 195 MMHG | HEART RATE: 84 BPM | DIASTOLIC BLOOD PRESSURE: 108 MMHG

## 2020-03-17 DIAGNOSIS — E11.3213 CONTROLLED TYPE 2 DIABETES MELLITUS WITH BOTH EYES AFFECTED BY MILD NONPROLIFERATIVE RETINOPATHY AND MACULAR EDEMA, WITHOUT LONG-TERM CURRENT USE OF INSULIN: Primary | ICD-10-CM

## 2020-03-17 DIAGNOSIS — H35.033 HYPERTENSIVE RETINOPATHY, BILATERAL: ICD-10-CM

## 2020-03-17 PROCEDURE — 92014 COMPRE OPH EXAM EST PT 1/>: CPT | Mod: HCNC,S$GLB,, | Performed by: OPHTHALMOLOGY

## 2020-03-17 PROCEDURE — 67028 INJECTION EYE DRUG: CPT | Mod: HCNC,RT,S$GLB, | Performed by: OPHTHALMOLOGY

## 2020-03-17 PROCEDURE — 92134 POSTERIOR SEGMENT OCT RETINA (OCULAR COHERENCE TOMOGRAPHY)-BOTH EYES: ICD-10-PCS | Mod: HCNC,S$GLB,, | Performed by: OPHTHALMOLOGY

## 2020-03-17 PROCEDURE — 67028 PR INJECT INTRAVITREAL PHARMCOLOGIC: ICD-10-PCS | Mod: HCNC,RT,S$GLB, | Performed by: OPHTHALMOLOGY

## 2020-03-17 PROCEDURE — 92014 PR EYE EXAM, EST PATIENT,COMPREHESV: ICD-10-PCS | Mod: HCNC,S$GLB,, | Performed by: OPHTHALMOLOGY

## 2020-03-17 PROCEDURE — 92134 CPTRZ OPH DX IMG PST SGM RTA: CPT | Mod: HCNC,S$GLB,, | Performed by: OPHTHALMOLOGY

## 2020-03-17 RX ADMIN — Medication 1.25 MG: at 12:03

## 2020-03-17 NOTE — PROGRESS NOTES
HPI     1 mo OCT Avastin   DLS:  02/11/2020 Dr. Reina    Pt sts slight improvement in OD. No change in OS.   Denies pain   (-)Flashes (-)Floaters  (-)Photophobia  (-)Glare    AT's PRN       Prior OCT - DME OD increased  OS - slight increase in parafoveal ME    Prior [facility] FA - peripheral NP and dropout OU  Late macular leakage OU      A/P    1. Mod NPDR OU  T2 controlled without insulin    2. DME OD>OS    2/20 increase in DME  S/p Avastin OD x 1    Avastin OD today    3. HTN Ret OU    BS/BP/cholc ontrol    4. Early NS OU      1 month OCT    Risks, benefits, and alternatives to treatment discussed in detail with the patient.  The patient voiced understanding and wished to proceed with the procedure    Injection Procedure Note:  Diagnosis: DME OD    Patient Identified and Time Out complete  Pt Prefers to be marked with sticker rather than ink marker ([email or web address].[email or web address] #5)  Topical Proparacaine and Betadine.  Inject Avastin OD at 6:00 @ 3.5-4mm posterior to limbus  Post Operative Dx: Same  Complications: None  Follow up as above.

## 2020-03-17 NOTE — PATIENT INSTRUCTIONS

## 2020-03-23 ENCOUNTER — PATIENT MESSAGE (OUTPATIENT)
Dept: ADMINISTRATIVE | Facility: OTHER | Age: 60
End: 2020-03-23

## 2020-03-29 ENCOUNTER — PATIENT MESSAGE (OUTPATIENT)
Dept: ADMINISTRATIVE | Facility: OTHER | Age: 60
End: 2020-03-29

## 2020-03-30 DIAGNOSIS — E11.9 TYPE 2 DIABETES MELLITUS: ICD-10-CM

## 2020-04-24 ENCOUNTER — PATIENT MESSAGE (OUTPATIENT)
Dept: INTERNAL MEDICINE | Facility: CLINIC | Age: 60
End: 2020-04-24

## 2020-04-24 ENCOUNTER — TELEPHONE (OUTPATIENT)
Dept: INTERNAL MEDICINE | Facility: CLINIC | Age: 60
End: 2020-04-24

## 2020-04-24 NOTE — TELEPHONE ENCOUNTER
----- Message from Judi Iglesias sent at 4/24/2020  2:01 PM CDT -----  Contact: Pa / Nurse care manager/ Mariam [phone] ext [phone]  The patient told her that she stopped taking metoprolol succinate (TOPROL-XL) 100 MG 24 hr tablet because, her BP was still running high and went back to taking the Carvidelol 25mg twice a day. This seems to be working /73.    Please call and advise    Thank you

## 2020-04-28 ENCOUNTER — PATIENT MESSAGE (OUTPATIENT)
Dept: ADMINISTRATIVE | Facility: OTHER | Age: 60
End: 2020-04-28

## 2020-04-28 ENCOUNTER — PROCEDURE VISIT (OUTPATIENT)
Dept: OPHTHALMOLOGY | Facility: CLINIC | Age: 60
End: 2020-04-28
Payer: MEDICARE

## 2020-04-28 DIAGNOSIS — E11.3213 CONTROLLED TYPE 2 DIABETES MELLITUS WITH BOTH EYES AFFECTED BY MILD NONPROLIFERATIVE RETINOPATHY AND MACULAR EDEMA, WITHOUT LONG-TERM CURRENT USE OF INSULIN: Primary | ICD-10-CM

## 2020-04-28 DIAGNOSIS — H35.033 HYPERTENSIVE RETINOPATHY, BILATERAL: ICD-10-CM

## 2020-04-28 PROCEDURE — 67028 PR INJECT INTRAVITREAL PHARMCOLOGIC: ICD-10-PCS | Mod: HCNC,RT,S$GLB, | Performed by: OPHTHALMOLOGY

## 2020-04-28 PROCEDURE — 67028 INJECTION EYE DRUG: CPT | Mod: HCNC,RT,S$GLB, | Performed by: OPHTHALMOLOGY

## 2020-04-28 PROCEDURE — 92014 PR EYE EXAM, EST PATIENT,COMPREHESV: ICD-10-PCS | Mod: 25,HCNC,S$GLB, | Performed by: OPHTHALMOLOGY

## 2020-04-28 PROCEDURE — 92014 COMPRE OPH EXAM EST PT 1/>: CPT | Mod: 25,HCNC,S$GLB, | Performed by: OPHTHALMOLOGY

## 2020-04-28 RX ADMIN — Medication 1.25 MG: at 10:04

## 2020-04-28 NOTE — PATIENT INSTRUCTIONS

## 2020-04-28 NOTE — PROGRESS NOTES
HPI     DLS:  3/17/20    Pt here for 1 month OCT and Avastin OD.  Pt denies changes since last   visit OU. Pt denies flashes, floaters or eye pain.    AT's PRN       Prior OCT - DME OD increased  OS - slight increase in parafoveal ME    Prior [facility] FA - peripheral NP and dropout OU  Late macular leakage OU      A/P    1. Mod NPDR OU  T2 controlled without insulin    2. DME OD>OS    2/20 increase in DME  S/p Avastin OD x 2    Avastin OD today    3. HTN Ret OU    BS/BP/cholc ontrol    4. Early NS OU      1 month Avastin OD only    Risks, benefits, and alternatives to treatment discussed in detail with the patient.  The patient voiced understanding and wished to proceed with the procedure    Injection Procedure Note:  Diagnosis: DME OD    Patient Identified and Time Out complete  Pt Prefers to be marked with sticker rather than ink marker (OHS.Qual.[email or web address] #5)  Topical Proparacaine and Betadine.  Inject Avastin OD at 6:00 @ 3.5-4mm posterior to limbus  Post Operative Dx: Same  Complications: None  Follow up as above.

## 2020-05-12 DIAGNOSIS — E11.22 TYPE 2 DIABETES MELLITUS WITH STAGE 3 CHRONIC KIDNEY DISEASE, WITHOUT LONG-TERM CURRENT USE OF INSULIN: ICD-10-CM

## 2020-05-12 DIAGNOSIS — N18.30 TYPE 2 DIABETES MELLITUS WITH STAGE 3 CHRONIC KIDNEY DISEASE, WITHOUT LONG-TERM CURRENT USE OF INSULIN: ICD-10-CM

## 2020-05-12 RX ORDER — LEVOTHYROXINE SODIUM 200 UG/1
TABLET ORAL
Qty: 30 TABLET | Refills: 0 | Status: SHIPPED | OUTPATIENT
Start: 2020-05-12 | End: 2020-11-09

## 2020-05-12 NOTE — PROGRESS NOTES
Subjective:      Patient ID: Kathie Quezada is a 60 y.o. female.    Chief Complaint:  Follow-up    History of Present Illness  Kathie Quezada presents today for follow up of hypothyroidism & type 2 DM.    She was seeing MDs at [facility] and wanted to switch everything over to [facility].      Patient had a stroke ~ 2 years ago.     BP of 180/100 in clinic- she has a headache at this moment. Denies dizziness or weakness. She did not take her BP medication this morning.      Had a total thyroidectomy in 08/2017 at [facility] Dr. Colon for mng that was causing compressive symptoms. Per patient pathology was benign. Had FNAs done previously per patient all were benign as well.      Had trouble after surgery with her vocal cords and choking on saliva. Had another surgery to fix the vocal cords and per patient it did not help.     With regards to her hypothyroidism:     Current medication:  synthroid 175 mcg- 1 tablet daily      Takes thyroid medication properly without food first thing in the morning      Denies missed doses    current symptoms:   + weight gain  + Fatigue   Denies Constipation   Denies Hair loss  + Brittle nails  + Mental fog   No cp, palpations or sob  Denies heat intolerance  + cold intolerance     Last BMD dated: never      Also has Type 2 DM.   With regards to diabetes:     Current regimen:  None- PCP discontinued medication     Other meds tried: Metformin (GI Issues)     Missed doses? Yes occ     0-1 times a day testing  Fasting BG this AM: 108      Eats 2 meals a day.   Snacks no      Drinks diet coke & water     Exercise - tried to stay active       Education - last visit: none    Diabetes Management Status  Statin: Taking  ACE/ARB: Not taking  Screening or Prevention Patient's value Goal Complete/Controlled?   HgA1C Testing and Control   Lab Results   Component Value Date    HGBA1C 5.7 (H) 01/09/2020      Annually/Less than 8% Yes   Lipid profile : 01/09/2020 Annually Yes   LDL control Lab  Results   Component Value Date    LDLCALC 105.8 01/09/2020    Annually/Less than 100 mg/dl  No   Nephropathy screening Lab Results   Component Value Date    LABMICR 1896.0 04/09/2018     Lab Results   Component Value Date    PROTEINUA 2+ (A) 05/27/2019    Annually Yes   Blood pressure BP Readings from Last 1 Encounters:   05/13/20 (!) 180/100    Less than 140/90 No   Dilated retinal exam : 04/28/2020 Annually Yes   Foot exam   : 11/08/2019 Annually Yes     She had a total hysterectomy for fibroids in 1996.    Last BMD 9/2019:  FINDINGS:  Lumbar Spine: Lumbar bone mineral density L1-L4 is 0.940g/cm2, which is a T-score of -1.0. The Z-score is -0.6.    Total Hip: Total hip bone mineral density is 0.890g/cm2.  The T-score is -0.4, and the Z-score is -0.3.    Femoral neck: Bone mineral density is 0.833g/cm2 and the T-score is -0.1 and the Z-score is 0.0 g/cm2.    There is a  % risk of a major osteoporotic fracture and a  % risk of hip fracture in the next 10 years (FRAX).    Compared with previous DXA, BMD at the lumbar spine has remained stable, and the BMD at the total hip has remained stable.      Impression       BMD indicates normal bone mineral density although BMD at the spine is close to osteopenic range.  Please correlate clinically if patient's history of back fracture is a possible fragility fracture.    RECOMMENDATIONS of [facility]:    1.  Calcium 7272-9053 mg daily and vitamin D 800-1000 units daily, adequate exercise.    2.  Repeat BMD in 3-4 years.     L-spine Xray 7/2019:  COMPARISON:  05/24/2019    FINDINGS:  Moderate L2 compression fracture, with increased height loss from the prior exam.  Prior T12 vertebral plasty stable.  Osteopenia noted.  Slight mild dextrocurvature to the lumbar spine.      Impression       Moderate L2 compression fracture, noting increased height loss since the 05/24/2019 exam.     Had a kyphoplasty after this xray.     With regards to  "the vitamin d deficiency:  Ergocalciferol 50 k weekly     Vit D, 25-Hydroxy   Date Value Ref Range Status   01/09/2020 26 (L) 30 - 96 ng/mL Final     Comment:     Vitamin D deficiency.........<10 ng/mL                              Vitamin D insufficiency......10-29 ng/mL       Vitamin D sufficiency........> or equal to 30 ng/mL  Vitamin D toxicity............>100 ng/mL         Review of Systems   Constitutional: Positive for fatigue and unexpected weight change (gain).   Eyes: Negative for visual disturbance.   Respiratory: Negative for shortness of breath.    Cardiovascular: Negative for chest pain.   Gastrointestinal: Negative for abdominal pain.   Endocrine: Positive for cold intolerance.   Musculoskeletal: Negative for arthralgias.   Skin: Negative for wound.   Neurological: Positive for headaches.   Hematological: Does not bruise/bleed easily.   Psychiatric/Behavioral: Negative for sleep disturbance.       Objective:   Physical Exam   Constitutional: She appears well-developed.   Neck: No thyromegaly present.   Cardiovascular: Normal rate.   Pulmonary/Chest: Effort normal.   Abdominal: Soft.   Vitals reviewed.    Visit Vitals  BP (!) 180/100   Ht 5' 10" (1.778 m)   Wt 112.9 kg (249 lb)   BMI 35.73 kg/m²      Lab Review:   Lab Results   Component Value Date    HGBA1C 5.7 (H) 01/09/2020    HGBA1C 4.8 05/25/2019    HGBA1C 5.4 01/31/2019      Lab Results   Component Value Date    CHOL 218 (H) 01/09/2020    HDL 91 (H) 01/09/2020    LDLCALC 105.8 01/09/2020    TRIG 106 01/09/2020    CHOLHDL 41.7 01/09/2020     Lab Results   Component Value Date     01/09/2020    K 4.5 01/09/2020     01/09/2020    CO2 24 01/09/2020     01/09/2020    BUN 38 (H) 01/09/2020    CREATININE 2.3 (H) 01/09/2020    CALCIUM 7.9 (L) 01/09/2020    PROT 7.6 01/09/2020    ALBUMIN 3.6 01/09/2020    BILITOT 0.5 01/09/2020    ALKPHOS 117 01/09/2020    AST 18 01/09/2020    ALT 22 01/09/2020    ANIONGAP 9 01/09/2020    ESTGFRAFRICA 26 " (A) 01/09/2020    EGFRNONAA 23 (A) 01/09/2020    TSH 4.048 (H) 01/09/2020     Vit D, 25-Hydroxy   Date Value Ref Range Status   01/09/2020 26 (L) 30 - 96 ng/mL Final     Comment:     Vitamin D deficiency.........<10 ng/mL                              Vitamin D insufficiency......10-29 ng/mL       Vitamin D sufficiency........> or equal to 30 ng/mL  Vitamin D toxicity............>100 ng/mL       Assessment and Plan     1. Postoperative hypothyroidism  TSH    Hemoglobin A1C    Comprehensive metabolic panel   2. Type 2 diabetes mellitus without complication, without long-term current use of insulin  TSH    Hemoglobin A1C    Comprehensive metabolic panel    blood-glucose meter kit    lancets Misc    blood sugar diagnostic Strp   3. 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 Closed compression fracture of L2 lumbar vertebra with delayed healing, subsequent encounter  DXA Bone Density Spine And Hip   5. Asymptomatic menopausal state  DXA Bone Density Spine And Hip   6. Vitamin D deficiency  Vitamin D   7. Renovascular hypertension     8. Mixed hyperlipidemia     9. Class 2 severe obesity due to excess calories with serious comorbidity and body mass index (BMI) of 35.0 to 35.9 in adult       Postoperative hypothyroidism  -- Clinically hypothyroid  -- Goal is a normal TSH  -- Check TFTs today and adjust dosage accordingly   -- Avoid exogenous hyperthyroidism as this can accelerate bone loss and increase risk of CV complications.  -- Advised to take LT4 on an empty stomach with water and to wait 30-45 minutes before eating or taking other medications   -- Reviewed usual  times of thyroid hormone  changes- call if start/ stop ocps, weight changes, attempting conception and during pregnancy   -- Reviewed that symptoms of hypothyroidism may not correlate with tsh, and a normal TSH is the goal of  therapy.....  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  Check A1c today   Will send in new prescription for meter & testing supplies.    Closed compression fracture of L2 lumbar vertebra  Patient not feeling well today in clinic due to high BP- will discuss osteoporosis medications at next visit.  Plan for a BMD now    Vitamin D deficiency  Check Vitamin d today    Renovascular hypertension  BP elevated today and patient was symptomatic with a headache.  Patient admits to not taking her BP medication this AM.  -- Encouraged patient to take BP medication every morning and to monitor BP at home.  Notify PCP is sustaining >130/80.    -- encouraged patient to go straight home and take BP medication. If headache does not resolve to go reach out to her PCP or go to the ER. She verbalized understanding.     Mixed hyperlipidemia  LP today  On statin per ADA recommendations    Class 2 severe obesity due to excess calories with serious comorbidity and body mass index (BMI) of 35.0 to 35.9 in adult  -- encouraged dietary and lifestyle modifications   -- emphasized weight loss goals     Follow up in about 3 months (around 8/13/2020).  Labs today     Case discussed with Dr. Keller  Recommendations were discussed with the patient in detail  The patient verbalized understanding and agrees with the plan outlined as above.

## 2020-05-13 ENCOUNTER — PATIENT MESSAGE (OUTPATIENT)
Dept: ENDOCRINOLOGY | Facility: CLINIC | Age: 60
End: 2020-05-13

## 2020-05-13 ENCOUNTER — OFFICE VISIT (OUTPATIENT)
Dept: ENDOCRINOLOGY | Facility: CLINIC | Age: 60
End: 2020-05-13
Payer: MEDICARE

## 2020-05-13 VITALS
BODY MASS INDEX: 35.65 KG/M2 | SYSTOLIC BLOOD PRESSURE: 180 MMHG | DIASTOLIC BLOOD PRESSURE: 100 MMHG | HEIGHT: 70 IN | WEIGHT: 249 LBS

## 2020-05-13 DIAGNOSIS — E78.2 MIXED HYPERLIPIDEMIA: ICD-10-CM

## 2020-05-13 DIAGNOSIS — E89.0 POSTOPERATIVE HYPOTHYROIDISM: Primary | ICD-10-CM

## 2020-05-13 DIAGNOSIS — Z78.0 ASYMPTOMATIC MENOPAUSAL STATE: ICD-10-CM

## 2020-05-13 DIAGNOSIS — E55.9 VITAMIN D DEFICIENCY: ICD-10-CM

## 2020-05-13 DIAGNOSIS — I15.0 RENOVASCULAR HYPERTENSION: ICD-10-CM

## 2020-05-13 DIAGNOSIS — S32.020G CLOSED COMPRESSION FRACTURE OF L2 LUMBAR VERTEBRA WITH DELAYED HEALING, SUBSEQUENT ENCOUNTER: ICD-10-CM

## 2020-05-13 DIAGNOSIS — E11.3213 CONTROLLED TYPE 2 DIABETES MELLITUS WITH BOTH EYES AFFECTED BY MILD NONPROLIFERATIVE RETINOPATHY AND MACULAR EDEMA, WITHOUT LONG-TERM CURRENT USE OF INSULIN: ICD-10-CM

## 2020-05-13 DIAGNOSIS — E11.9 TYPE 2 DIABETES MELLITUS WITHOUT COMPLICATION, WITHOUT LONG-TERM CURRENT USE OF INSULIN: ICD-10-CM

## 2020-05-13 DIAGNOSIS — E66.01 CLASS 2 SEVERE OBESITY DUE TO EXCESS CALORIES WITH SERIOUS COMORBIDITY AND BODY MASS INDEX (BMI) OF 35.0 TO 35.9 IN ADULT: ICD-10-CM

## 2020-05-13 PROBLEM — E66.812 CLASS 2 SEVERE OBESITY DUE TO EXCESS CALORIES WITH SERIOUS COMORBIDITY AND BODY MASS INDEX (BMI) OF 35.0 TO 35.9 IN ADULT: Status: ACTIVE | Noted: 2020-05-13

## 2020-05-13 PROCEDURE — 99499 RISK ADDL DX/OHS AUDIT: ICD-10-PCS | Mod: HCNC,S$GLB,, | Performed by: NURSE PRACTITIONER

## 2020-05-13 PROCEDURE — 99999 PR PBB SHADOW E&M-EST. PATIENT-LVL III: ICD-10-PCS | Mod: PBBFAC,HCNC,, | Performed by: NURSE PRACTITIONER

## 2020-05-13 PROCEDURE — 99499 UNLISTED E&M SERVICE: CPT | Mod: HCNC,S$GLB,, | Performed by: NURSE PRACTITIONER

## 2020-05-13 PROCEDURE — 99999 PR PBB SHADOW E&M-EST. PATIENT-LVL III: CPT | Mod: PBBFAC,HCNC,, | Performed by: NURSE PRACTITIONER

## 2020-05-13 RX ORDER — INSULIN PUMP SYRINGE, 3 ML
EACH MISCELLANEOUS
Qty: 1 EACH | Refills: 0 | Status: SHIPPED | OUTPATIENT
Start: 2020-05-13 | End: 2020-11-06

## 2020-05-13 RX ORDER — LANCETS
EACH MISCELLANEOUS
Qty: 60 EACH | Refills: 11 | Status: SHIPPED | OUTPATIENT
Start: 2020-05-13 | End: 2020-11-06

## 2020-05-13 NOTE — ASSESSMENT & PLAN NOTE
-- Clinically hypothyroid  -- Goal is a normal TSH  -- Check TFTs today and adjust dosage accordingly   -- Avoid exogenous hyperthyroidism as this can accelerate bone loss and increase risk of CV complications.  -- Advised to take LT4 on an empty stomach with water and to wait 30-45 minutes before eating or taking other medications   -- Reviewed usual  times of thyroid hormone  changes- call if start/ stop ocps, weight changes, attempting conception and during pregnancy   -- Reviewed that symptoms of hypothyroidism may not correlate with tsh, and a normal TSH is the goal of therapy.....  symptoms are not a justification for over treatment

## 2020-05-13 NOTE — PROGRESS NOTES
Patient, Kathie Quezada (MRN #[id number]), presented with a recorded BMI of 35.73 kg/m^2 and a documented comorbidity(s):  - Diabetes Mellitus Type 2  - Hypertension  - Hyperlipidemia  to which the severe obesity is a contributing factor. This is consistent with the definition of severe obesity (BMI 35.0-39.9) with comorbidity (ICD-10 E66.01, Z68.35). The patient's severe obesity was monitored, evaluated, addressed and/or treated. This addendum to the medical record is made on 05/13/2020.

## 2020-05-13 NOTE — ASSESSMENT & PLAN NOTE
Patient was taken off all medication by her pcp.  Check A1c today   Will send in new prescription for meter & testing supplies.

## 2020-05-13 NOTE — ASSESSMENT & PLAN NOTE
BP elevated today and patient was symptomatic with a headache.  Patient admits to not taking her BP medication this AM.  -- Encouraged patient to take BP medication every morning and to monitor BP at home.  Notify PCP is sustaining >130/80.    -- encouraged patient to go straight home and take BP medication. If headache does not resolve to go reach out to her PCP or go to the ER. She verbalized understanding.

## 2020-05-13 NOTE — ASSESSMENT & PLAN NOTE
Patient not feeling well today in clinic due to high BP- will discuss osteoporosis medications at next visit.  Plan for a BMD now

## 2020-06-23 ENCOUNTER — OFFICE VISIT (OUTPATIENT)
Dept: INTERNAL MEDICINE | Facility: CLINIC | Age: 60
End: 2020-06-23
Payer: MEDICARE

## 2020-06-23 ENCOUNTER — IMMUNIZATION (OUTPATIENT)
Dept: PHARMACY | Facility: CLINIC | Age: 60
End: 2020-06-23
Payer: COMMERCIAL

## 2020-06-23 VITALS
DIASTOLIC BLOOD PRESSURE: 80 MMHG | HEART RATE: 79 BPM | WEIGHT: 247.38 LBS | HEIGHT: 71 IN | SYSTOLIC BLOOD PRESSURE: 130 MMHG | BODY MASS INDEX: 34.63 KG/M2 | OXYGEN SATURATION: 100 %

## 2020-06-23 DIAGNOSIS — Z98.890 S/P KYPHOPLASTY: ICD-10-CM

## 2020-06-23 DIAGNOSIS — F41.1 GENERALIZED ANXIETY DISORDER: ICD-10-CM

## 2020-06-23 DIAGNOSIS — K21.9 GASTROESOPHAGEAL REFLUX DISEASE WITHOUT ESOPHAGITIS: ICD-10-CM

## 2020-06-23 DIAGNOSIS — E66.01 CLASS 2 SEVERE OBESITY DUE TO EXCESS CALORIES WITH SERIOUS COMORBIDITY AND BODY MASS INDEX (BMI) OF 35.0 TO 35.9 IN ADULT: ICD-10-CM

## 2020-06-23 DIAGNOSIS — Z11.4 ENCOUNTER FOR SCREENING FOR HUMAN IMMUNODEFICIENCY VIRUS (HIV): ICD-10-CM

## 2020-06-23 DIAGNOSIS — D68.1 FACTOR XI DEFICIENCY: ICD-10-CM

## 2020-06-23 DIAGNOSIS — N18.32 CKD STAGE G3B/A3, GFR 30-44 AND ALBUMIN CREATININE RATIO >300 MG/G: ICD-10-CM

## 2020-06-23 DIAGNOSIS — S32.020D CLOSED COMPRESSION FRACTURE OF L2 LUMBAR VERTEBRA WITH ROUTINE HEALING, SUBSEQUENT ENCOUNTER: ICD-10-CM

## 2020-06-23 DIAGNOSIS — E55.9 VITAMIN D DEFICIENCY: ICD-10-CM

## 2020-06-23 DIAGNOSIS — E78.2 MIXED HYPERLIPIDEMIA: ICD-10-CM

## 2020-06-23 DIAGNOSIS — E89.0 POSTOPERATIVE HYPOTHYROIDISM: ICD-10-CM

## 2020-06-23 DIAGNOSIS — I50.32 CHRONIC DIASTOLIC HEART FAILURE: Primary | ICD-10-CM

## 2020-06-23 DIAGNOSIS — I15.0 RENOVASCULAR HYPERTENSION: ICD-10-CM

## 2020-06-23 DIAGNOSIS — R80.9 NEPHROTIC RANGE PROTEINURIA: ICD-10-CM

## 2020-06-23 DIAGNOSIS — E11.3213 CONTROLLED TYPE 2 DIABETES MELLITUS WITH BOTH EYES AFFECTED BY MILD NONPROLIFERATIVE RETINOPATHY AND MACULAR EDEMA, WITHOUT LONG-TERM CURRENT USE OF INSULIN: ICD-10-CM

## 2020-06-23 PROCEDURE — 3008F PR BODY MASS INDEX (BMI) DOCUMENTED: ICD-10-PCS | Mod: HCNC,CPTII,S$GLB, | Performed by: INTERNAL MEDICINE

## 2020-06-23 PROCEDURE — 99499 UNLISTED E&M SERVICE: CPT | Mod: HCNC,S$GLB,, | Performed by: INTERNAL MEDICINE

## 2020-06-23 PROCEDURE — 99999 PR PBB SHADOW E&M-EST. PATIENT-LVL IV: CPT | Mod: PBBFAC,HCNC,, | Performed by: INTERNAL MEDICINE

## 2020-06-23 PROCEDURE — 3075F PR MOST RECENT SYSTOLIC BLOOD PRESS GE 130-139MM HG: ICD-10-PCS | Mod: HCNC,CPTII,S$GLB, | Performed by: INTERNAL MEDICINE

## 2020-06-23 PROCEDURE — 3008F BODY MASS INDEX DOCD: CPT | Mod: HCNC,CPTII,S$GLB, | Performed by: INTERNAL MEDICINE

## 2020-06-23 PROCEDURE — 3079F PR MOST RECENT DIASTOLIC BLOOD PRESSURE 80-89 MM HG: ICD-10-PCS | Mod: HCNC,CPTII,S$GLB, | Performed by: INTERNAL MEDICINE

## 2020-06-23 PROCEDURE — 99499 RISK ADDL DX/OHS AUDIT: ICD-10-PCS | Mod: HCNC,S$GLB,, | Performed by: INTERNAL MEDICINE

## 2020-06-23 PROCEDURE — 3044F HG A1C LEVEL LT 7.0%: CPT | Mod: HCNC,CPTII,S$GLB, | Performed by: INTERNAL MEDICINE

## 2020-06-23 PROCEDURE — 99214 PR OFFICE/OUTPT VISIT, EST, LEVL IV, 30-39 MIN: ICD-10-PCS | Mod: HCNC,S$GLB,, | Performed by: INTERNAL MEDICINE

## 2020-06-23 PROCEDURE — 99999 PR PBB SHADOW E&M-EST. PATIENT-LVL IV: ICD-10-PCS | Mod: PBBFAC,HCNC,, | Performed by: INTERNAL MEDICINE

## 2020-06-23 PROCEDURE — 3044F PR MOST RECENT HEMOGLOBIN A1C LEVEL <7.0%: ICD-10-PCS | Mod: HCNC,CPTII,S$GLB, | Performed by: INTERNAL MEDICINE

## 2020-06-23 PROCEDURE — 99214 OFFICE O/P EST MOD 30 MIN: CPT | Mod: HCNC,S$GLB,, | Performed by: INTERNAL MEDICINE

## 2020-06-23 PROCEDURE — 3075F SYST BP GE 130 - 139MM HG: CPT | Mod: HCNC,CPTII,S$GLB, | Performed by: INTERNAL MEDICINE

## 2020-06-23 PROCEDURE — 3079F DIAST BP 80-89 MM HG: CPT | Mod: HCNC,CPTII,S$GLB, | Performed by: INTERNAL MEDICINE

## 2020-06-23 RX ORDER — CARVEDILOL 25 MG/1
25 TABLET ORAL 2 TIMES DAILY WITH MEALS
Qty: 180 TABLET | Refills: 3 | Status: SHIPPED | OUTPATIENT
Start: 2020-06-23 | End: 2021-03-03 | Stop reason: SDUPTHER

## 2020-06-23 RX ORDER — ONDANSETRON HYDROCHLORIDE 8 MG/1
8 TABLET, FILM COATED ORAL EVERY 8 HOURS PRN
Qty: 90 TABLET | Refills: 3 | Status: SHIPPED | OUTPATIENT
Start: 2020-06-23 | End: 2020-12-04

## 2020-06-23 NOTE — PROGRESS NOTES
INTERNAL MEDICINE CLINIC  Follow-up Visit Progress Note     PRESENTING HISTORY     PCP: Elan Price MD    Last Clinic Visit with me:  1-8-2020    Current Chief Complaint/Problem:    Chief Complaint   Patient presents with    Follow-up     History of Present Illness & ROS: Ms. Kathie Quezada is a 60 y.o. female.    Doing well.  Has low back pain still.    Nephew found dead last Sunday.  She is upset about it.    No chest pain.  No SOB.  No leg swelling.    PAST HISTORY:     Past Medical History:   Diagnosis Date    Acute respiratory failure with hypoxia and hypercapnia 5/26/2019    ATN (acute tubular necrosis) 2/8/2019    Chronic diastolic heart failure 2/7/2019 2-8-19 Mild left atrial enlargement. Mild left ventricular enlargement. Normal left ventricular systolic function. The estimated ejection fraction is 55% Indeterminate left ventricular diastolic function. Normal right ventricular systolic function. Mild mitral regurgitation. Mild to moderate tricuspid regurgitation. 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  7-31-19 Factor XI Activity 55 - 145 % 96       Generalized anxiety disorder 11/8/2019    GERD (gastroesophageal reflux disease)     GIB (gastrointestinal bleeding) 10/18/2018    History of hepatitis C     s/p succesful Rx w/ Harvoni  - SVR12 (cure) 2016    Hypertensive retinopathy, bilateral 12/10/2019    Mild nonproliferative diabetic retinopathy 05/02/2018    Mixed hyperlipidemia 4/9/2018    NS (nuclear sclerosis), bilateral 12/10/2019    PNA (pneumonia) 5/28/2019     Postoperative hypothyroidism     Renovascular hypertension 4/9/2018    S/P kyphoplasty 8/14/2019 8/14/19: L2 kyphoplasty with Dr. Kwon     Slow transit constipation 5/27/2019    Type 2 diabetes mellitus with circulatory disorder, with long-term current use of insulin 4/9/2018    Type 2 diabetes mellitus with stage 3 chronic kidney disease, without long-term current use of insulin 4/9/2018       Past Surgical History:   Procedure Laterality Date    BACK SURGERY  2017    CHOLECYSTECTOMY  2015    [facility]    COLONOSCOPY  11/13/2015    Dr Chino torres prep. internal hemorrhoids. diverticulosis of sigmois. NO polyps. repeat due 2025    FIXATION KYPHOPLASTY Bilateral 8/14/2019    Procedure: L2 kyphoplasty Fluoro Globus;  Surgeon: Jeremie Kwon DO;  Location: [facility] OR;  Service: Neurosurgery;  Laterality: Bilateral;  GLOBUS MACRINA WINSLOW [phone] TEXTED HER @ 10:54AM ON 7-30-19  PRE-OP-BY RN  8-7-2019    HYSTERECTOMY      OOPHORECTOMY      one    THYROIDECTOMY  2016    at [facility] for thyroid nodule    TONSILLECTOMY         Family History   Problem Relation Age of Onset    Diabetes type II Mother        Social History     Socioeconomic History    Marital status:      Spouse name: Not on file    Number of children: Not on file    Years of education: Not on file    Highest education level: Not on file   Occupational History    Not on file   Social Needs    Financial resource strain: Somewhat hard    Food insecurity     Worry: Sometimes true     Inability: Sometimes true    Transportation needs     Medical: No     Non-medical: No   Tobacco Use    Smoking status: Never Smoker    Smokeless tobacco: Never Used   Substance and Sexual Activity    Alcohol use: Yes     Alcohol/week: 4.0 standard drinks     Types: 4 Cans of beer per week     Frequency: Monthly or less     Drinks per session: 1 or 2     Binge frequency: Never     Comment: weekly    Drug use: No    Sexual activity: Not on file    Lifestyle    Physical activity     Days per week: Not on file     Minutes per session: 30 min    Stress: Not at all   Relationships    Social connections     Talks on phone: More than three times a week     Gets together: More than three times a week     Attends [employer] service: Not on file     Active member of club or organization: Yes     Attends meetings of clubs or organizations: More than 4 times per year     Relationship status:    Other Topics Concern    Not on file   Social History Narrative    Pt enjoys cooking and baking and making Floral arrangement and Kerline [employer]        MEDICATIONS & ALLERGIES:     Current Outpatient Medications on File Prior to Visit   Medication Sig Dispense Refill    amLODIPine (NORVASC) 10 MG tablet Take 1 tablet (10 mg total) by mouth once daily. 90 tablet 3    atorvastatin (LIPITOR) 20 MG tablet Take 2 tablets (40 mg total) by mouth once daily. 90 tablet 3    blood sugar diagnostic Strp To check BG 2 times daily, to use with insurance preferred meter 60 each 11    blood-glucose meter kit To check BG 2 times daily, to use with insurance preferred meter 1 each 0    ergocalciferol (ERGOCALCIFEROL) 50,000 unit Cap Take 1 capsule (50,000 Units total) by mouth every 7 days. 12 capsule 3    escitalopram oxalate (LEXAPRO) 10 MG tablet Take 1 tablet (10 mg total) by mouth once daily. 90 tablet 3    lancets Misc To check BG 2 times daily, to use with insurance preferred meter 60 each 11    pantoprazole (PROTONIX) 40 MG tablet Take 1 tablet (40 mg total) by mouth once daily. 90 tablet 3    peg 400/hypromellose/glycerin (VISINE DRY EYE RELIEF OPHT) Apply 1 drop to eye 2 (two) times daily as needed (BOTH EYES).      spironolactone (ALDACTONE) 25 MG tablet Take 1 tablet (25 mg total) by mouth once daily. 90 tablet 3    SYNTHROID 200 mcg tablet TAKE 1 TABLET(200 MCG) BY MOUTH EVERY DAY 30 tablet 0    traZODone (DESYREL) 50 MG tablet Take 1 tablet (50 mg total) by  mouth nightly as needed for Insomnia. 90 tablet 3    [DISCONTINUED] metoprolol succinate (TOPROL-XL) 100 MG 24 hr tablet Take 1 tablet (100 mg total) by mouth once daily. 90 tablet 3    varicella-zoster gE-AS01B, PF, (SHINGRIX, PF,) 50 mcg/0.5 mL injection Inject 0.5 mLs into the muscle once. For one dose. for 1 dose 0.5 mL 1     No current facility-administered medications on file prior to visit.         Review of patient's allergies indicates:   Allergen Reactions    Mushroom Hives    Bactrim [sulfamethoxazole-trimethoprim] Hives    Codeine Hives    Gabapentin      Pruritis     Iodine     Penicillins Hives    Shellfish containing products Itching    Sulfa (sulfonamide antibiotics) Hives       Medications Reconciliation:   I have reconciled the patient's home medications and discharge medications with the patient/family. I have updated all changes.  Refer to After-Visit Medication List.    OBJECTIVE:     Vital Signs:  Vitals:    06/23/20 1119   BP: 130/80   Pulse: 79     Wt Readings from Last 1 Encounters:   06/23/20 1119 112.2 kg (247 lb 5.7 oz)     Body mass index is 34.99 kg/m².     Physical Exam:  General: Well developed, well nourished. No distress.  HEENT: Head is normocephalic, atraumatic; ears are normal.    Eyes: Clear conjunctiva.  Neck: Supple, symmetrical neck; trachea midline.  Lungs: Clear to auscultation bilaterally and normal respiratory effort.  Cardiovascular: Heart with regular rate and rhythm.    Extremities: No LE edema. Pulses 2+ and symmetric.   Abdomen: Abdomen is soft, non-tender non-distended with normal bowel sounds.  Skin: Skin color, texture, turgor normal. No rashes.  Musculoskeletal: Normal gait.   Lymph Nodes: No cervical or supraclavicular adenopathy.  Neurologic: Normal strength and tone. No focal numbness or weakness.   Psychiatric: Normal affect. Alert.    Laboratory  Lab Results   Component Value Date    WBC 16.03 (H) 01/09/2020    HGB 11.0 (L) 01/09/2020    HCT 33.4  (L) 01/09/2020     01/09/2020    CHOL 213 (H) 05/13/2020    TRIG 135 05/13/2020    HDL 64 05/13/2020    ALT 10 05/13/2020    AST 16 05/13/2020     05/13/2020    K 4.2 05/13/2020     05/13/2020    CREATININE 2.4 (H) 05/13/2020    BUN 30 (H) 05/13/2020    CO2 19 (L) 05/13/2020    TSH 4.047 (H) 05/13/2020    INR 1.0 08/07/2019    HGBA1C 5.6 05/13/2020       ASSESSMENT & PLAN:     Chronic diastolic heart failure  CKD stage G3b/A3, GFR 30-44 and albumin creatinine ratio >300 mg/g  Nephrotic range proteinuria  Renovascular hypertension  - Compensated. BP controlled.            On Coreg 25 mg BID,          On amlodipine 10 mg daily     On spironolactone (ALDACTONE) 25 MG tablet; Take 1 tablet (25 mg total) by mouth once daily.       Vitamin D deficiency  -     ergocalciferol (ERGOCALCIFEROL) 50,000 unit Cap; Take 1 capsule (50,000 Units total) by mouth every 7 days.       Gastroesophageal reflux disease without esophagitis  -     pantoprazole (PROTONIX) 40 MG tablet; Take 1 tablet (40 mg total) by mouth once daily.      -     ondansetron (ZOFRAN) 8 MG tablet; Take 1 tablet (8 mg total) by mouth every 8 (eight) hours as needed for Nausea.  Dispense: 90 tablet; Refill: 3    Postoperative hypothyroidism  -     levothyroxine (SYNTHROID) 200 MCG tablet; Take 1 tablet (200 mcg total) by mouth before breakfast.        Ref. Range 1/9/2020 11:45 5/13/2020 10:43   TSH Latest Ref Range: 0.400 - 4.000 uIU/mL 4.048 (H) 4.047 (H)   Free T4 Latest Ref Range: 0.71 - 1.51 ng/dL 1.10 0.90       Type 2 diabetes mellitus with stage 3 chronic kidney disease, without long-term current use of insulin  - Diet controlled. A1c 5.6     Mixed hyperlipidemia  - On Lipitor 20 mg daily.     Vitamin D Deficiency  - On Vitamin D 50,000 weekly.     Normocytic anemia  -   Improved. Normal B12 and iron.     Generalized anxiety disorder  -     escitalopram oxalate (LEXAPRO) 10 MG tablet; Take 1 tablet (10 mg total) by mouth once daily.    -      traZODone (DESYREL) 50 MG tablet; Take 1 tablet (50 mg total) by mouth nightly as needed for Insomnia.       Closed compression fracture of L2 lumbar vertebra s/p L2 kyphoplasty  - Doing well post procedure.     Factor XI deficiency  - Usually get  FFP 2 units right before surgery for Factor XI deficiency.    Ref. Range 7/31/2019 17:14   Factor XI Activity Latest Ref Range: 55 - 145 % 96         Preventive Health Maintenance:  - Shingrix  #2     Return to Clinic for Follow Up with me:  Dec with labs 3 days before.          CBC auto differential; Future; Expected date: 12/23/2020        Comprehensive metabolic panel; Future; Expected date: 12/23/2020              HIV 1/2 Ag/Ab (4th Gen); Future; Expected date: 12/23/2020              Hemoglobin A1C; Future; Expected date: 12/23/2020              Vitamin D; Future; Expected date: 12/23/2020    Scheduled Follow-up :  Future Appointments   Date Time Provider Department Center   8/14/2020  9:00 AM Hemalatha Brink NP [facility]   12/1/2020 10:00 AM LAB, APPOINTMENT [facility] IN[facility] LAB IM [facility]   12/4/2020 11:00 AM Elan Price MD [facility]       After Visit Medication List :     Medication List          Accurate as of June 23, 2020 12:16 PM. If you have any questions, ask your nurse or doctor.            START taking these medications    carvediloL 25 MG tablet  Commonly known as: COREG  Take 1 tablet (25 mg total) by mouth 2 (two) times daily with meals.  Started by: Elan Price MD     ondansetron 8 MG tablet  Commonly known as: ZOFRAN  Take 1 tablet (8 mg total) by mouth every 8 (eight) hours as needed for Nausea.  Started by: Elan Price MD        CONTINUE taking these medications    amLODIPine 10 MG tablet  Commonly known as: NORVASC  Take 1 tablet (10 mg total) by mouth once daily.     atorvastatin 20 MG tablet  Commonly known as: LIPITOR  Take 2 tablets (40 mg total) by mouth once daily.     blood sugar diagnostic Strp  To  check BG 2 times daily, to use with insurance preferred meter     blood-glucose meter kit  To check BG 2 times daily, to use with insurance preferred meter     ergocalciferol 50,000 unit Cap  Commonly known as: ERGOCALCIFEROL  Take 1 capsule (50,000 Units total) by mouth every 7 days.     escitalopram oxalate 10 MG tablet  Commonly known as: LEXAPRO  Take 1 tablet (10 mg total) by mouth once daily.     lancets Misc  To check BG 2 times daily, to use with insurance preferred meter     pantoprazole 40 MG tablet  Commonly known as: PROTONIX  Take 1 tablet (40 mg total) by mouth once daily.     SHINGRIX (PF) 50 mcg/0.5 mL injection  Generic drug: varicella-zoster gE-AS01B (PF)  Inject 0.5 mLs into the muscle once. For one dose. for 1 dose     spironolactone 25 MG tablet  Commonly known as: ALDACTONE  Take 1 tablet (25 mg total) by mouth once daily.     SYNTHROID 200 MCG tablet  Generic drug: levothyroxine  TAKE 1 TABLET(200 MCG) BY MOUTH EVERY DAY     traZODone 50 MG tablet  Commonly known as: DESYREL  Take 1 tablet (50 mg total) by mouth nightly as needed for Insomnia.     VISINE DRY EYE RELIEF OPHT        STOP taking these medications    metoprolol succinate 100 MG 24 hr tablet  Commonly known as: TOPROL-XL  Stopped by: Elan Price MD           Where to Get Your Medications      These medications were sent to [id number] - [address] AT [address] [address]    Phone: [phone]   · carvediloL 25 MG tablet  · ondansetron 8 MG tablet         Signing Physician:  Elan Price MD

## 2020-07-21 ENCOUNTER — TELEPHONE (OUTPATIENT)
Dept: HEMATOLOGY/ONCOLOGY | Facility: CLINIC | Age: 60
End: 2020-07-21
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
## 2020-07-21 NOTE — TELEPHONE ENCOUNTER
Patient appointment scheduled and patient informed we do not prescribe narcotics or do back injections.

## 2020-07-27 ENCOUNTER — OFFICE VISIT (OUTPATIENT)
Dept: NEUROSURGERY | Facility: CLINIC | Age: 60
End: 2020-07-27
Payer: MEDICARE

## 2020-07-27 VITALS
DIASTOLIC BLOOD PRESSURE: 85 MMHG | OXYGEN SATURATION: 99 % | HEART RATE: 76 BPM | WEIGHT: 246.06 LBS | BODY MASS INDEX: 34.8 KG/M2 | SYSTOLIC BLOOD PRESSURE: 192 MMHG | TEMPERATURE: 97 F

## 2020-07-27 DIAGNOSIS — R35.0 URINARY FREQUENCY: Primary | ICD-10-CM

## 2020-07-27 DIAGNOSIS — R20.2 NUMBNESS AND TINGLING OF BOTH LEGS: ICD-10-CM

## 2020-07-27 DIAGNOSIS — R20.0 NUMBNESS AND TINGLING OF BOTH LEGS: ICD-10-CM

## 2020-07-27 DIAGNOSIS — M54.9 DORSALGIA, UNSPECIFIED: ICD-10-CM

## 2020-07-27 DIAGNOSIS — M54.50 ACUTE LEFT-SIDED LOW BACK PAIN, UNSPECIFIED WHETHER SCIATICA PRESENT: ICD-10-CM

## 2020-07-27 PROCEDURE — 3079F DIAST BP 80-89 MM HG: CPT | Mod: HCNC,CPTII,S$GLB, | Performed by: PHYSICIAN ASSISTANT

## 2020-07-27 PROCEDURE — 3079F PR MOST RECENT DIASTOLIC BLOOD PRESSURE 80-89 MM HG: ICD-10-PCS | Mod: HCNC,CPTII,S$GLB, | Performed by: PHYSICIAN ASSISTANT

## 2020-07-27 PROCEDURE — 3077F PR MOST RECENT SYSTOLIC BLOOD PRESSURE >= 140 MM HG: ICD-10-PCS | Mod: HCNC,CPTII,S$GLB, | Performed by: PHYSICIAN ASSISTANT

## 2020-07-27 PROCEDURE — 99214 PR OFFICE/OUTPT VISIT, EST, LEVL IV, 30-39 MIN: ICD-10-PCS | Mod: HCNC,S$GLB,, | Performed by: PHYSICIAN ASSISTANT

## 2020-07-27 PROCEDURE — 99999 PR PBB SHADOW E&M-EST. PATIENT-LVL IV: ICD-10-PCS | Mod: PBBFAC,HCNC,, | Performed by: PHYSICIAN ASSISTANT

## 2020-07-27 PROCEDURE — 99999 PR PBB SHADOW E&M-EST. PATIENT-LVL IV: CPT | Mod: PBBFAC,HCNC,, | Performed by: PHYSICIAN ASSISTANT

## 2020-07-27 PROCEDURE — 3008F PR BODY MASS INDEX (BMI) DOCUMENTED: ICD-10-PCS | Mod: HCNC,CPTII,S$GLB, | Performed by: PHYSICIAN ASSISTANT

## 2020-07-27 PROCEDURE — 99214 OFFICE O/P EST MOD 30 MIN: CPT | Mod: HCNC,S$GLB,, | Performed by: PHYSICIAN ASSISTANT

## 2020-07-27 PROCEDURE — 3008F BODY MASS INDEX DOCD: CPT | Mod: HCNC,CPTII,S$GLB, | Performed by: PHYSICIAN ASSISTANT

## 2020-07-27 PROCEDURE — 3077F SYST BP >= 140 MM HG: CPT | Mod: HCNC,CPTII,S$GLB, | Performed by: PHYSICIAN ASSISTANT

## 2020-07-27 RX ORDER — METHOCARBAMOL 750 MG/1
750 TABLET, FILM COATED ORAL NIGHTLY PRN
Qty: 30 TABLET | Refills: 0 | Status: SHIPPED | OUTPATIENT
Start: 2020-07-27 | End: 2020-12-04 | Stop reason: SDUPTHER
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
## 2020-07-31 ENCOUNTER — HOSPITAL ENCOUNTER (OUTPATIENT)
Dept: RADIOLOGY | Facility: HOSPITAL | Age: 60
Discharge: HOME | End: 2020-07-31
Attending: PHYSICIAN ASSISTANT
Payer: MEDICARE

## 2020-07-31 DIAGNOSIS — M54.9 DORSALGIA, UNSPECIFIED: ICD-10-CM

## 2020-07-31 DIAGNOSIS — M54.50 ACUTE LEFT-SIDED LOW BACK PAIN, UNSPECIFIED WHETHER SCIATICA PRESENT: ICD-10-CM

## 2020-07-31 DIAGNOSIS — R35.0 URINARY FREQUENCY: ICD-10-CM

## 2020-07-31 DIAGNOSIS — R20.0 NUMBNESS AND TINGLING OF BOTH LEGS: ICD-10-CM

## 2020-07-31 DIAGNOSIS — R20.2 NUMBNESS AND TINGLING OF BOTH LEGS: ICD-10-CM

## 2020-07-31 PROCEDURE — 72148 MRI LUMBAR SPINE WITHOUT CONTRAST: ICD-10-PCS | Mod: 26,HCNC,, | Performed by: RADIOLOGY

## 2020-07-31 PROCEDURE — 72148 MRI LUMBAR SPINE W/O DYE: CPT | Mod: TC,HCNC

## 2020-07-31 PROCEDURE — 72148 MRI LUMBAR SPINE W/O DYE: CPT | Mod: 26,HCNC,, | Performed by: RADIOLOGY

## 2020-08-28 ENCOUNTER — PROCEDURE VISIT (OUTPATIENT)
Dept: OPHTHALMOLOGY | Facility: CLINIC | Age: 60
End: 2020-08-28
Payer: MEDICARE

## 2020-08-28 DIAGNOSIS — H35.033 HYPERTENSIVE RETINOPATHY, BILATERAL: ICD-10-CM

## 2020-08-28 DIAGNOSIS — E11.3213 CONTROLLED TYPE 2 DIABETES MELLITUS WITH BOTH EYES AFFECTED BY MILD NONPROLIFERATIVE RETINOPATHY AND MACULAR EDEMA, WITHOUT LONG-TERM CURRENT USE OF INSULIN: Primary | ICD-10-CM

## 2020-08-28 PROCEDURE — 67028 INJECTION EYE DRUG: CPT | Mod: HCNC,RT,S$GLB, | Performed by: OPHTHALMOLOGY

## 2020-08-28 PROCEDURE — 92012 INTRM OPH EXAM EST PATIENT: CPT | Mod: 25,HCNC,S$GLB, | Performed by: OPHTHALMOLOGY

## 2020-08-28 PROCEDURE — 92012 PR EYE EXAM, EST PATIENT,INTERMED: ICD-10-PCS | Mod: 25,HCNC,S$GLB, | Performed by: OPHTHALMOLOGY

## 2020-08-28 PROCEDURE — 67028 PR INJECT INTRAVITREAL PHARMCOLOGIC: ICD-10-PCS | Mod: HCNC,RT,S$GLB, | Performed by: OPHTHALMOLOGY

## 2020-08-28 RX ORDER — METOPROLOL SUCCINATE 100 MG/1
TABLET, EXTENDED RELEASE ORAL
COMMUNITY
Start: 2020-07-12 | End: 2020-11-03

## 2020-08-28 RX ADMIN — Medication 1.25 MG: at 10:08

## 2020-08-28 NOTE — PATIENT INSTRUCTIONS

## 2020-08-28 NOTE — PROGRESS NOTES
HPI     Eye Problem      Additional comments: NPDR              Comments     DLS: 0428/2020 Dr. Reina    Pt here for her overdue 1 month Avastin OD.  Patient states her vision has   been stable since her last visit. She denies flashes /floaters. She states   she has a bump on her inner LLL x 2 weeks. She states its painful 5/10,   itching, and burning. She has not used any medication on it.     Eye meds:  AT's PRN OU        OCT - DME OD persistent  OS - low grade parafoveal ME    Prior WF FA - peripheral NP and dropout OU  Late macular leakage OU      A/P    1. Mod NPDR OU  T2 controlled without insulin    2. DME OD>OS    2/20 increase in DME  S/p Avastin OD x 3    Avastin OD today    Get approval for Ozurdex    3. HTN Ret OU    BS/BP/cholc ontrol    4. Early NS OU      1 month OCT no dilate    Risks, benefits, and alternatives to treatment discussed in detail with the patient.  The patient voiced understanding and wished to proceed with the procedure    Injection Procedure Note:  Diagnosis: DME OD    Patient Identified and Time Out complete  Pt Prefers to be marked with sticker rather than ink marker ([email or web address] #5)  Topical Proparacaine and Betadine.  Inject Avastin OD at 6:00 @ 3.5-4mm posterior to limbus  Post Operative Dx: Same  Complications: None  Follow up as above.

## 2020-09-29 ENCOUNTER — PATIENT MESSAGE (OUTPATIENT)
Dept: OTHER | Facility: OTHER | Age: 60
End: 2020-09-29

## 2020-10-02 ENCOUNTER — PROCEDURE VISIT (OUTPATIENT)
Dept: OPHTHALMOLOGY | Facility: CLINIC | Age: 60
End: 2020-10-02
Payer: COMMERCIAL

## 2020-10-02 DIAGNOSIS — E11.3213 CONTROLLED TYPE 2 DIABETES MELLITUS WITH BOTH EYES AFFECTED BY MILD NONPROLIFERATIVE RETINOPATHY AND MACULAR EDEMA, WITHOUT LONG-TERM CURRENT USE OF INSULIN: Primary | ICD-10-CM

## 2020-10-02 DIAGNOSIS — H35.033 HYPERTENSIVE RETINOPATHY, BILATERAL: ICD-10-CM

## 2020-10-02 PROCEDURE — 92134 CPTRZ OPH DX IMG PST SGM RTA: CPT | Mod: HCNC,S$GLB,, | Performed by: OPHTHALMOLOGY

## 2020-10-02 PROCEDURE — 92012 INTRM OPH EXAM EST PATIENT: CPT | Mod: 25,HCNC,S$GLB, | Performed by: OPHTHALMOLOGY

## 2020-10-02 PROCEDURE — 92134 POSTERIOR SEGMENT OCT RETINA (OCULAR COHERENCE TOMOGRAPHY)-BOTH EYES: ICD-10-PCS | Mod: HCNC,S$GLB,, | Performed by: OPHTHALMOLOGY

## 2020-10-02 PROCEDURE — 67028 INJECTION EYE DRUG: CPT | Mod: HCNC,RT,S$GLB, | Performed by: OPHTHALMOLOGY

## 2020-10-02 PROCEDURE — 92012 PR EYE EXAM, EST PATIENT,INTERMED: ICD-10-PCS | Mod: 25,HCNC,S$GLB, | Performed by: OPHTHALMOLOGY

## 2020-10-02 PROCEDURE — 67028 PR INJECT INTRAVITREAL PHARMCOLOGIC: ICD-10-PCS | Mod: HCNC,RT,S$GLB, | Performed by: OPHTHALMOLOGY

## 2020-10-02 RX ADMIN — Medication 1.25 MG: at 09:10

## 2020-10-02 NOTE — PROGRESS NOTES
HPI     1 mo OCt   DLS: 08/28/20 Dr. Reina    Pt no change noticed in vision   Denies pain just itching   (-)Flashes (+)Floaters  (-)Photophobia  (-)Glare      Eye meds:  AT's PRN OU        OCT - DME OD persistent  OS - low grade parafoveal ME    Prior [facility] - peripheral NP and dropout OU  Late macular leakage OU      A/P    1. Mod NPDR OU  T2 controlled without insulin    2. DME OD>OS    2/20 increase in DME  S/p Avastin OD x 4    Avastin OD today    Get approval for Ozurdex    3. HTN Ret OU    BS/BP/cholc ontrol    4. Early NS OU      1 month OCT no dilate    Risks, benefits, and alternatives to treatment discussed in detail with the patient.  The patient voiced understanding and wished to proceed with the procedure    Injection Procedure Note:  Diagnosis: DME OD    Patient Identified and Time Out complete  Pt Prefers to be marked with sticker rather than ink marker (OHS.Qual.[id number] #5)  Topical Proparacaine and Betadine.  Inject Avastin OD at 6:00 @ 3.5-4mm posterior to limbus  Post Operative Dx: Same  Complications: None  Follow up as above.

## 2020-10-02 NOTE — PATIENT INSTRUCTIONS

## 2020-10-09 ENCOUNTER — PATIENT MESSAGE (OUTPATIENT)
Dept: INTERNAL MEDICINE | Facility: CLINIC | Age: 60
End: 2020-10-09

## 2020-10-15 DIAGNOSIS — E55.9 VITAMIN D DEFICIENCY: ICD-10-CM

## 2020-10-15 RX ORDER — ERGOCALCIFEROL 1.25 MG/1
50000 CAPSULE ORAL
Qty: 12 CAPSULE | Refills: 3 | Status: CANCELLED | OUTPATIENT
Start: 2020-10-15

## 2020-10-19 DIAGNOSIS — E55.9 VITAMIN D DEFICIENCY: ICD-10-CM

## 2020-10-19 RX ORDER — ERGOCALCIFEROL 1.25 MG/1
50000 CAPSULE ORAL
Qty: 12 CAPSULE | Refills: 3 | Status: SHIPPED | OUTPATIENT
Start: 2020-10-19 | End: 2021-03-03 | Stop reason: SDUPTHER

## 2020-11-03 ENCOUNTER — OFFICE VISIT (OUTPATIENT)
Dept: INTERNAL MEDICINE | Facility: CLINIC | Age: 60
End: 2020-11-03
Payer: COMMERCIAL

## 2020-11-03 ENCOUNTER — IMMUNIZATION (OUTPATIENT)
Dept: PHARMACY | Facility: CLINIC | Age: 60
End: 2020-11-03
Payer: COMMERCIAL

## 2020-11-03 VITALS
SYSTOLIC BLOOD PRESSURE: 140 MMHG | OXYGEN SATURATION: 98 % | HEART RATE: 77 BPM | WEIGHT: 261.25 LBS | DIASTOLIC BLOOD PRESSURE: 80 MMHG | HEIGHT: 70 IN | BODY MASS INDEX: 37.4 KG/M2

## 2020-11-03 DIAGNOSIS — I15.0 RENOVASCULAR HYPERTENSION: ICD-10-CM

## 2020-11-03 DIAGNOSIS — F41.1 GENERALIZED ANXIETY DISORDER: ICD-10-CM

## 2020-11-03 DIAGNOSIS — D68.1 FACTOR XI DEFICIENCY: ICD-10-CM

## 2020-11-03 DIAGNOSIS — Z09 FOLLOW UP: Primary | ICD-10-CM

## 2020-11-03 DIAGNOSIS — I50.32 CHRONIC DIASTOLIC HEART FAILURE: ICD-10-CM

## 2020-11-03 DIAGNOSIS — H35.033 HYPERTENSIVE RETINOPATHY, BILATERAL: ICD-10-CM

## 2020-11-03 DIAGNOSIS — E89.0 POSTOPERATIVE HYPOTHYROIDISM: ICD-10-CM

## 2020-11-03 DIAGNOSIS — E55.9 VITAMIN D DEFICIENCY: ICD-10-CM

## 2020-11-03 DIAGNOSIS — E11.3213 CONTROLLED TYPE 2 DIABETES MELLITUS WITH BOTH EYES AFFECTED BY MILD NONPROLIFERATIVE RETINOPATHY AND MACULAR EDEMA, WITHOUT LONG-TERM CURRENT USE OF INSULIN: ICD-10-CM

## 2020-11-03 DIAGNOSIS — N18.32 CKD STAGE G3B/A3, GFR 30-44 AND ALBUMIN CREATININE RATIO >300 MG/G: ICD-10-CM

## 2020-11-03 DIAGNOSIS — E78.2 MIXED HYPERLIPIDEMIA: ICD-10-CM

## 2020-11-03 PROCEDURE — 3079F PR MOST RECENT DIASTOLIC BLOOD PRESSURE 80-89 MM HG: ICD-10-PCS | Mod: CPTII,S$GLB,, | Performed by: NURSE PRACTITIONER

## 2020-11-03 PROCEDURE — 99999 PR PBB SHADOW E&M-EST. PATIENT-LVL IV: ICD-10-PCS | Mod: PBBFAC,,, | Performed by: NURSE PRACTITIONER

## 2020-11-03 PROCEDURE — 3077F SYST BP >= 140 MM HG: CPT | Mod: CPTII,S$GLB,, | Performed by: NURSE PRACTITIONER

## 2020-11-03 PROCEDURE — 3044F HG A1C LEVEL LT 7.0%: CPT | Mod: CPTII,S$GLB,, | Performed by: NURSE PRACTITIONER

## 2020-11-03 PROCEDURE — 3044F PR MOST RECENT HEMOGLOBIN A1C LEVEL <7.0%: ICD-10-PCS | Mod: CPTII,S$GLB,, | Performed by: NURSE PRACTITIONER

## 2020-11-03 PROCEDURE — 99396 PR PREVENTIVE VISIT,EST,40-64: ICD-10-PCS | Mod: S$GLB,,, | Performed by: NURSE PRACTITIONER

## 2020-11-03 PROCEDURE — 3077F PR MOST RECENT SYSTOLIC BLOOD PRESSURE >= 140 MM HG: ICD-10-PCS | Mod: CPTII,S$GLB,, | Performed by: NURSE PRACTITIONER

## 2020-11-03 PROCEDURE — 99999 PR PBB SHADOW E&M-EST. PATIENT-LVL IV: CPT | Mod: PBBFAC,,, | Performed by: NURSE PRACTITIONER

## 2020-11-03 PROCEDURE — 3079F DIAST BP 80-89 MM HG: CPT | Mod: CPTII,S$GLB,, | Performed by: NURSE PRACTITIONER

## 2020-11-03 PROCEDURE — 3008F BODY MASS INDEX DOCD: CPT | Mod: CPTII,S$GLB,, | Performed by: NURSE PRACTITIONER

## 2020-11-03 PROCEDURE — 3008F PR BODY MASS INDEX (BMI) DOCUMENTED: ICD-10-PCS | Mod: CPTII,S$GLB,, | Performed by: NURSE PRACTITIONER

## 2020-11-03 PROCEDURE — 99396 PREV VISIT EST AGE 40-64: CPT | Mod: S$GLB,,, | Performed by: NURSE PRACTITIONER
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
## 2020-11-04 ENCOUNTER — TELEPHONE (OUTPATIENT)
Dept: INTERNAL MEDICINE | Facility: CLINIC | Age: 60
End: 2020-11-04

## 2020-11-04 DIAGNOSIS — N18.30 STAGE 3 CHRONIC KIDNEY DISEASE, UNSPECIFIED WHETHER STAGE 3A OR 3B CKD: Primary | ICD-10-CM

## 2020-11-06 ENCOUNTER — PROCEDURE VISIT (OUTPATIENT)
Dept: OPHTHALMOLOGY | Facility: CLINIC | Age: 60
End: 2020-11-06
Payer: COMMERCIAL

## 2020-11-06 DIAGNOSIS — H35.033 HYPERTENSIVE RETINOPATHY, BILATERAL: ICD-10-CM

## 2020-11-06 DIAGNOSIS — E11.3213 CONTROLLED TYPE 2 DIABETES MELLITUS WITH BOTH EYES AFFECTED BY MILD NONPROLIFERATIVE RETINOPATHY AND MACULAR EDEMA, WITHOUT LONG-TERM CURRENT USE OF INSULIN: Primary | ICD-10-CM

## 2020-11-06 PROCEDURE — 67028 PR INJECT INTRAVITREAL PHARMCOLOGIC: ICD-10-PCS | Mod: RT,S$GLB,, | Performed by: OPHTHALMOLOGY

## 2020-11-06 PROCEDURE — 92012 INTRM OPH EXAM EST PATIENT: CPT | Mod: 25,S$GLB,, | Performed by: OPHTHALMOLOGY

## 2020-11-06 PROCEDURE — 67028 INJECTION EYE DRUG: CPT | Mod: RT,S$GLB,, | Performed by: OPHTHALMOLOGY

## 2020-11-06 PROCEDURE — 92012 PR EYE EXAM, EST PATIENT,INTERMED: ICD-10-PCS | Mod: 25,S$GLB,, | Performed by: OPHTHALMOLOGY

## 2020-11-06 NOTE — PROGRESS NOTES
HPI     1 mo OCt   DLS: 10/02/20 Dr. Reina    Pt no change noticed in vision   Denies pain  (-)Flashes (-)Floaters  (-)Photophobia  (-)Glare      Eye meds:  AT's (rarely) OU        OCT - DME OD persistent  OS - low grade parafoveal ME    Prior [facility] FA - peripheral NP and dropout OU  Late macular leakage OU      A/P    1. Mod NPDR OU  T2 controlled without insulin    2. DME OD>OS    2/20 increase in DME  S/p Avastin OD x 5    Ozurdex OD today    3. HTN Ret OU    BS/BP/cholc ontrol    4. Early NS OU      2 month OCT no dilate    Risks, benefits, and alternatives to treatment discussed in detail with the patient.  The patient voiced understanding and wished to proceed with the procedure    Injection Procedure Note:  Diagnosis: DME OD    Patient Identified and Time Out complete  Pt Prefers to be marked with sticker rather than ink marker (OHS.Qual.[email or web address] #5)  Topical Proparacaine and Betadine. subconj lido  Inject Ozurdex OD at 6:00 @ 3.5-4mm posterior to limbus  Post Operative Dx: Same  Complications: None  Follow up as above.

## 2020-11-08 ENCOUNTER — PATIENT OUTREACH (OUTPATIENT)
Dept: ADMINISTRATIVE | Facility: OTHER | Age: 60
End: 2020-11-08

## 2020-11-08 DIAGNOSIS — Z12.31 ENCOUNTER FOR SCREENING MAMMOGRAM FOR MALIGNANT NEOPLASM OF BREAST: Primary | ICD-10-CM

## 2020-11-08 NOTE — PROGRESS NOTES
Care Everywhere: updated  Immunization:   Health Maintenance: updated  Media Review: review for outside mammogram report   Legacy Review:   Order placed: mammogram  Upcoming appts:

## 2020-11-09 ENCOUNTER — OFFICE VISIT (OUTPATIENT)
Dept: NEPHROLOGY | Facility: CLINIC | Age: 60
End: 2020-11-09
Payer: COMMERCIAL

## 2020-11-09 VITALS
DIASTOLIC BLOOD PRESSURE: 92 MMHG | HEART RATE: 83 BPM | BODY MASS INDEX: 36.55 KG/M2 | SYSTOLIC BLOOD PRESSURE: 158 MMHG | OXYGEN SATURATION: 100 % | HEIGHT: 70 IN | WEIGHT: 255.31 LBS

## 2020-11-09 DIAGNOSIS — N18.30 STAGE 3 CHRONIC KIDNEY DISEASE, UNSPECIFIED WHETHER STAGE 3A OR 3B CKD: ICD-10-CM

## 2020-11-09 DIAGNOSIS — R80.9 PROTEINURIA, UNSPECIFIED TYPE: ICD-10-CM

## 2020-11-09 DIAGNOSIS — E87.20 ACIDOSIS, METABOLIC: ICD-10-CM

## 2020-11-09 DIAGNOSIS — I10 ESSENTIAL HYPERTENSION: Primary | ICD-10-CM

## 2020-11-09 DIAGNOSIS — E11.22 TYPE 2 DIABETES MELLITUS WITH STAGE 3B CHRONIC KIDNEY DISEASE, WITHOUT LONG-TERM CURRENT USE OF INSULIN: ICD-10-CM

## 2020-11-09 DIAGNOSIS — N18.4 CHRONIC KIDNEY DISEASE, STAGE IV (SEVERE): ICD-10-CM

## 2020-11-09 DIAGNOSIS — N18.32 TYPE 2 DIABETES MELLITUS WITH STAGE 3B CHRONIC KIDNEY DISEASE, WITHOUT LONG-TERM CURRENT USE OF INSULIN: ICD-10-CM

## 2020-11-09 PROBLEM — E11.319 CONTROLLED TYPE 2 DIABETES WITH RETINOPATHY: Status: ACTIVE | Noted: 2018-04-09

## 2020-11-09 PROCEDURE — 3008F BODY MASS INDEX DOCD: CPT | Mod: CPTII,S$GLB,, | Performed by: INTERNAL MEDICINE

## 2020-11-09 PROCEDURE — 3044F PR MOST RECENT HEMOGLOBIN A1C LEVEL <7.0%: ICD-10-PCS | Mod: CPTII,S$GLB,, | Performed by: INTERNAL MEDICINE

## 2020-11-09 PROCEDURE — 99214 OFFICE O/P EST MOD 30 MIN: CPT | Mod: S$GLB,,, | Performed by: INTERNAL MEDICINE

## 2020-11-09 PROCEDURE — 99999 PR PBB SHADOW E&M-EST. PATIENT-LVL IV: CPT | Mod: PBBFAC,,, | Performed by: INTERNAL MEDICINE

## 2020-11-09 PROCEDURE — 99499 UNLISTED E&M SERVICE: CPT | Mod: S$GLB,,, | Performed by: INTERNAL MEDICINE

## 2020-11-09 PROCEDURE — 3077F PR MOST RECENT SYSTOLIC BLOOD PRESSURE >= 140 MM HG: ICD-10-PCS | Mod: CPTII,S$GLB,, | Performed by: INTERNAL MEDICINE

## 2020-11-09 PROCEDURE — 99499 RISK ADDL DX/OHS AUDIT: ICD-10-PCS | Mod: S$GLB,,, | Performed by: INTERNAL MEDICINE

## 2020-11-09 PROCEDURE — 3080F PR MOST RECENT DIASTOLIC BLOOD PRESSURE >= 90 MM HG: ICD-10-PCS | Mod: CPTII,S$GLB,, | Performed by: INTERNAL MEDICINE

## 2020-11-09 PROCEDURE — 99214 PR OFFICE/OUTPT VISIT, EST, LEVL IV, 30-39 MIN: ICD-10-PCS | Mod: S$GLB,,, | Performed by: INTERNAL MEDICINE

## 2020-11-09 PROCEDURE — 3008F PR BODY MASS INDEX (BMI) DOCUMENTED: ICD-10-PCS | Mod: CPTII,S$GLB,, | Performed by: INTERNAL MEDICINE

## 2020-11-09 PROCEDURE — 3080F DIAST BP >= 90 MM HG: CPT | Mod: CPTII,S$GLB,, | Performed by: INTERNAL MEDICINE

## 2020-11-09 PROCEDURE — 3077F SYST BP >= 140 MM HG: CPT | Mod: CPTII,S$GLB,, | Performed by: INTERNAL MEDICINE

## 2020-11-09 PROCEDURE — 3044F HG A1C LEVEL LT 7.0%: CPT | Mod: CPTII,S$GLB,, | Performed by: INTERNAL MEDICINE

## 2020-11-09 PROCEDURE — 99999 PR PBB SHADOW E&M-EST. PATIENT-LVL IV: ICD-10-PCS | Mod: PBBFAC,,, | Performed by: INTERNAL MEDICINE

## 2020-11-09 RX ORDER — LEVOTHYROXINE SODIUM 200 UG/1
TABLET ORAL
Qty: 30 TABLET | Refills: 0 | Status: SHIPPED | OUTPATIENT
Start: 2020-11-09 | End: 2020-12-04 | Stop reason: SDUPTHER

## 2020-11-09 RX ORDER — SODIUM BICARBONATE 325 MG/1
1300 TABLET ORAL 2 TIMES DAILY
Qty: 120 TABLET | Refills: 6 | COMMUNITY
Start: 2020-11-09 | End: 2021-03-03

## 2020-11-09 RX ORDER — CHLORTHALIDONE 50 MG/1
50 TABLET ORAL DAILY
Qty: 90 TABLET | Refills: 11 | Status: SHIPPED | OUTPATIENT
Start: 2020-11-09 | End: 2020-12-04

## 2020-11-09 NOTE — PROGRESS NOTES
REASON FOR CONSULT: ckd    REFERRING PHYSICIAN: Elan Price MD      HISTORY OF PRESENT ILLNESS: 60 y.o. female who is new to me  has a past medical history of DMII with retinopathy, HTN, CVA, and multiple AKIS the last was from urinary retention. patient was referred here for abnormal renal function, her renal function has been declining due to above mentioned reasons over the last 2-3 years, she used to see us in the clinic but lost follow up. Patient feels weak as chronic problem, no urinary or cardiac symptoms, no NSAIDs intake.       ROS:  General: negative for chills, or fatigue  ENT: No epistaxis or headaches  Hematological and Lymphatic: No bleeding problems or blood clots.  Endocrine: No skin changes or temperature intolerance  Respiratory: No cough, shortness of breath, or wheezing  Cardiovascular: No chest pain or dyspnea   Gastrointestinal: No abdominal pain, change in bowel habits  Genito-Urinary: No dysuria, trouble voiding, or hematuria  Musculoskeletal: ROS: negative for - joint pain, joint stiffness, joint swelling, muscle pain or muscular weakness  Neurological: No focal weakness, no numbness  Dermatological: No rash or ulcers.    PAST MEDICAL HISTORY:  Past Medical History:   Diagnosis Date    Acute respiratory failure with hypoxia and hypercapnia 5/26/2019    ATN (acute tubular necrosis) 2/8/2019    Chronic diastolic heart failure 2/7/2019 2-8-19 Mild left atrial enlargement. Mild left ventricular enlargement. Normal left ventricular systolic function. The estimated ejection fraction is 55% Indeterminate left ventricular diastolic function. Normal right ventricular systolic function. Mild mitral regurgitation. Mild to moderate tricuspid regurgitation. 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  7-31-19 Factor XI Activity 55 - 145 % 96       Generalized anxiety disorder 11/8/2019    GERD (gastroesophageal reflux disease)     GIB (gastrointestinal bleeding) 10/18/2018    History of hepatitis C     s/p succesful Rx w/ [facility]  - SVR12 (cure) 2016    Hypertensive retinopathy, bilateral 12/10/2019    Mild nonproliferative diabetic retinopathy 05/02/2018    Mixed hyperlipidemia 4/9/2018    NS (nuclear sclerosis), bilateral 12/10/2019    PNA (pneumonia) 5/28/2019    Postoperative hypothyroidism     Renovascular hypertension 4/9/2018    S/P kyphoplasty 8/14/2019 8/14/19: L2 kyphoplasty with Dr. Kwon     Slow transit constipation 5/27/2019    Type 2 diabetes mellitus with circulatory disorder, with long-term current use of insulin 4/9/2018    Type 2 diabetes mellitus with stage 3 chronic kidney disease, without long-term current use of insulin 4/9/2018       PAST SURGICAL HISTORY:  Past Surgical History:   Procedure Laterality Date    BACK SURGERY  2017    CHOLECYSTECTOMY  2015    [place]    COLONOSCOPY  11/13/2015    Dr Chino torres prep. internal hemorrhoids. diverticulosis of sigmois. NO polyps. repeat due 2025    FIXATION KYPHOPLASTY Bilateral 8/14/2019    Procedure: L2 kyphoplasty Fluoro Globus;  Surgeon: Jeremie Kwon DO;  Location: [facility];  Service: Neurosurgery;  Laterality: Bilateral;  GLOBUS MACR[facility] WINSLOW [phone] TEXTED HER @ 10:54AM ON 7-30-19  PRE-OP-BY RN  8-7-2019    HYSTERECTOMY      OOPHORECTOMY      one    THYROIDECTOMY  2016    at [facility] for [facility] nodule    TONSILLECTOMY         FAMILY HISTORY:   Family History   Problem Relation Age of Onset    Diabetes type II Mother        SOCIAL HISTORY:  Social History     Socioeconomic History    Marital  status:      Spouse name: Not on file    Number of children: Not on file    Years of education: Not on file    Highest education level: Not on file   Occupational History    Not on file   Social Needs    Financial resource strain: Somewhat hard    Food insecurity     Worry: Sometimes true     Inability: Sometimes true    Transportation needs     Medical: No     Non-medical: No   Tobacco Use    Smoking status: Never Smoker    Smokeless tobacco: Never Used   Substance and Sexual Activity    Alcohol use: Yes     Alcohol/week: 4.0 standard drinks     Types: 4 Cans of beer per week     Frequency: Monthly or less     Drinks per session: 1 or 2     Binge frequency: Never     Comment: weekly    Drug use: No    Sexual activity: Not on file   Lifestyle    Physical activity     Days per week: Not on file     Minutes per session: 30 min    Stress: Not at all   Relationships    Social connections     Talks on phone: More than three times a week     Gets together: More than three times a week     Attends [employer] service: Not on file     Active member of club or organization: Yes     Attends meetings of clubs or organizations: More than 4 times per year     Relationship status:    Other Topics Concern    Not on file   Social History Narrative    Pt enjoys cooking and baking and making Floral arrangement and Ker[employer]        ALLERGIES:  Review of patient's allergies indicates:   Allergen Reactions    Mushroom Hives    Bactrim [sulfamethoxazole-trimethoprim] Hives    Codeine Hives    Gabapentin      Pruritis     Iodine     Penicillins Hives    Shellfish containing products Itching    Sulfa (sulfonamide antibiotics) Hives       MEDICATIONS:    Current Outpatient Medications:     amLODIPine (NORVASC) 10 MG tablet, Take 1 tablet (10 mg total) by mouth once daily., Disp: 90 tablet, Rfl: 3    atorvastatin (LIPITOR) 20 MG tablet, Take 2 tablets (40 mg total) by mouth once daily., Disp: 90  tablet, Rfl: 3    carvediloL (COREG) 25 MG tablet, Take 1 tablet (25 mg total) by mouth 2 (two) times daily with meals., Disp: 180 tablet, Rfl: 3    ergocalciferol (ERGOCALCIFEROL) 50,000 unit Cap, Take 1 capsule (50,000 Units total) by mouth every 7 days., Disp: 12 capsule, Rfl: 3    escitalopram oxalate (LEXAPRO) 10 MG tablet, Take 1 tablet (10 mg total) by mouth once daily., Disp: 90 tablet, Rfl: 3    ondansetron (ZOFRAN) 8 MG tablet, Take 1 tablet (8 mg total) by mouth every 8 (eight) hours as needed for Nausea., Disp: 90 tablet, Rfl: 3    peg 400/hypromellose/glycerin (VISINE DRY EYE RELIEF OPHT), Apply 1 drop to eye 2 (two) times daily as needed (BOTH EYES)., Disp: , Rfl:     SYNTHROID 200 mcg tablet, TAKE 1 TABLET(200 MCG) BY MOUTH EVERY DAY, Disp: 30 tablet, Rfl: 0    traZODone (DESYREL) 50 MG tablet, Take 1 tablet (50 mg total) by mouth nightly as needed for Insomnia., Disp: 90 tablet, Rfl: 3    blood sugar diagnostic Strp, To check BG 2 times daily, to use with insurance preferred meter, Disp: 60 each, Rfl: 11    chlorthalidone (HYGROTEN) 50 MG Tab, Take 1 tablet (50 mg total) by mouth once daily., Disp: 90 tablet, Rfl: 11    methocarbamoL (ROBAXIN) 750 MG Tab, Take 1 tablet (750 mg total) by mouth nightly as needed (muscle spasms). (Patient not taking: Reported on 11/9/2020), Disp: 30 tablet, Rfl: 0    pantoprazole (PROTONIX) 40 MG tablet, Take 1 tablet (40 mg total) by mouth once daily., Disp: 90 tablet, Rfl: 3    sodium bicarbonate 325 MG tablet, Take 4 tablets (1,300 mg total) by mouth 2 (two) times daily., Disp: 120 tablet, Rfl: 6   Medication List with Changes/Refills   New Medications    CHLORTHALIDONE (HYGROTEN) 50 MG TAB    Take 1 tablet (50 mg total) by mouth once daily.    SODIUM BICARBONATE 325 MG TABLET    Take 4 tablets (1,300 mg total) by mouth 2 (two) times daily.   Current Medications    AMLODIPINE (NORVASC) 10 MG TABLET    Take 1 tablet (10 mg total) by mouth once daily.     "ATORVASTATIN (LIPITOR) 20 MG TABLET    Take 2 tablets (40 mg total) by mouth once daily.    BLOOD SUGAR DIAGNOSTIC STRP    To check BG 2 times daily, to use with insurance preferred meter    CARVEDILOL (COREG) 25 MG TABLET    Take 1 tablet (25 mg total) by mouth 2 (two) times daily with meals.    ERGOCALCIFEROL (ERGOCALCIFEROL) 50,000 UNIT CAP    Take 1 capsule (50,000 Units total) by mouth every 7 days.    ESCITALOPRAM OXALATE (LEXAPRO) 10 MG TABLET    Take 1 tablet (10 mg total) by mouth once daily.    METHOCARBAMOL (ROBAXIN) 750 MG TAB    Take 1 tablet (750 mg total) by mouth nightly as needed (muscle spasms).    ONDANSETRON (ZOFRAN) 8 MG TABLET    Take 1 tablet (8 mg total) by mouth every 8 (eight) hours as needed for Nausea.    PANTOPRAZOLE (PROTONIX) 40 MG TABLET    Take 1 tablet (40 mg total) by mouth once daily.    /HYPROMELLOSE/GLYCERIN (VISINE DRY EYE RELIEF OPHT)    Apply 1 drop to eye 2 (two) times daily as needed (BOTH EYES).    TRAZODONE (DESYREL) 50 MG TABLET    Take 1 tablet (50 mg total) by mouth nightly as needed for Insomnia.   Changed and/or Refilled Medications    Modified Medication Previous Medication    SYNTHROID 200 MCG TABLET SYNTHROID 200 mcg tablet       TAKE 1 TABLET(200 MCG) BY MOUTH EVERY DAY    TAKE 1 TABLET(200 MCG) BY MOUTH EVERY DAY   Discontinued Medications    SPIRONOLACTONE (ALDACTONE) 25 MG TABLET    Take 1 tablet (25 mg total) by mouth once daily.        PHYSICAL EXAM:  BP (!) 158/92 (BP Location: Left arm, Patient Position: Sitting, BP Method: Large (Manual))   Pulse 83   Ht 5' 10" (1.778 m)   Wt 115.8 kg (255 lb 4.7 oz)   SpO2 100%   BMI 36.63 kg/m²     General: No distress, No fever or chills  Head: Normocephalic,atraumatic  Eyes: conjunctivae/corneas clear. PERRL, EOM's intact.  Nose: Nares normal. Mucosa normal. No drainage or sinus tenderness.  Neck: No adenopathy,no carotid bruit,no JVD  Lungs:Clear to auscultation bilaterally, No Crackles  Heart: Regular " rate and rhythm, no murmur, gallops or rubs  Abdomen: Soft, no tenderness, bowel sounds normal  Extremities: Atraumatic, no edema in LE  Skin: Turgor normal. No rashes or ulcers  Neurologic: No focal weakness, oriented.  Dialysis Access: Non applicable         LABS:  Lab Results   Component Value Date    CREATININE 3.0 (H) 11/03/2020       Prot/Creat Ratio, Urine   Date Value Ref Range Status   05/27/2019 1.37 (H) 0.00 - 0.20 Final   02/21/2019 1.70 (H) 0.00 - 0.20 Final   08/23/2018 3.85 (H) 0.00 - 0.20 Final       Lab Results   Component Value Date     11/03/2020    K 4.4 11/03/2020    CO2 17 (L) 11/03/2020       last PTH   Lab Results   Component Value Date    .0 (H) 08/23/2018    CALCIUM 7.5 (L) 11/03/2020    PHOS 3.7 05/24/2019       Lab Results   Component Value Date    HGB 10.7 (L) 11/03/2020        Lab Results   Component Value Date    HGBA1C 5.9 (H) 11/03/2020       Lab Results   Component Value Date    LDLCALC 88.2 11/03/2020           ASSESSMENT:    CKD IV  -likely from DMII (with retinopathy) /uncontrolled HTN/PVD (CVA with residual weakness) and recurrent AKIs  -Cr baseline ~ 2.5-3.0 with eGFR ~ 20 ml/min  -UPCR 1.5 g/g, used to be in nephrotic range before: spep,HIV,Hep panel wnl  -Renal US ckd    HTN  Uncontrolled on Coreg and Amlodipine     Anemia  -from ckd  -Hgb goal ~ 10  -Iron stores not available    Secondary Hyperparathyroidism  -Phos/Ca acceptable  -PTH na      Acid/Base  -Metabolic acidosis              PLAN:  -Continue current BP meds regimen and add Chlorthalidone 50 mg/d. Report BP from home.  -Low salt diet < 2 g/d  -Renal US to r/o retention  -Avoid NSAIDs intake      RTC in 3 months with labs      Thanks for allowing me to participate in the care of this patient.     3:32 PM    CARLTON GIRON MD  NEPHROLOGY ATTENDING

## 2020-11-09 NOTE — LETTER
November 9, 2020      Darcy Engel, FNP  [address]  [facility] [address]           [facility] - Nephrology 5th Fl  [address]  [facility] [address]  Phone: [phone]  Fax: [phone]          Patient: Kathie Quezada   MR Number: [id number]   YOB: 1960   Date of Visit: 11/9/2020       Dear Darcy Engel:    Thank you for referring Kathie Quezada to me for evaluation. Attached you will find relevant portions of my assessment and plan of care.    If you have questions, please do not hesitate to call me. I look forward to following Kathie Quezada along with you.    Sincerely,    Cuba Kimball MD    Enclosure  CC:  No Recipients    If you would like to receive this communication electronically, please contact [email or web address] or [phone] to request more information on [facility] access.    For providers and/or their staff who would like to refer a patient to [facility], please contact us through our one-stop-shop provider referral line, [facility], at [phone].    If you feel you have received this communication in error or would no longer like to receive these types of communications, please e-mail [email or web address]

## 2020-12-01 ENCOUNTER — LAB VISIT (OUTPATIENT)
Dept: LAB | Facility: HOSPITAL | Age: 60
End: 2020-12-01
Attending: INTERNAL MEDICINE
Payer: COMMERCIAL

## 2020-12-01 DIAGNOSIS — E55.9 VITAMIN D DEFICIENCY: ICD-10-CM

## 2020-12-01 DIAGNOSIS — Z11.4 ENCOUNTER FOR SCREENING FOR HUMAN IMMUNODEFICIENCY VIRUS (HIV): ICD-10-CM

## 2020-12-01 DIAGNOSIS — E11.3213 CONTROLLED TYPE 2 DIABETES MELLITUS WITH BOTH EYES AFFECTED BY MILD NONPROLIFERATIVE RETINOPATHY AND MACULAR EDEMA, WITHOUT LONG-TERM CURRENT USE OF INSULIN: ICD-10-CM

## 2020-12-01 DIAGNOSIS — I50.32 CHRONIC DIASTOLIC HEART FAILURE: ICD-10-CM

## 2020-12-01 DIAGNOSIS — N18.32 CKD STAGE G3B/A3, GFR 30-44 AND ALBUMIN CREATININE RATIO >300 MG/G: ICD-10-CM

## 2020-12-01 LAB
25(OH)D3+25(OH)D2 SERPL-MCNC: 30 NG/ML (ref 30–96)
ALBUMIN SERPL BCP-MCNC: 3.5 G/DL (ref 3.5–5.2)
ALP SERPL-CCNC: 101 U/L (ref 55–135)
ALT SERPL W/O P-5'-P-CCNC: 12 U/L (ref 10–44)
ANION GAP SERPL CALC-SCNC: 9 MMOL/L (ref 8–16)
AST SERPL-CCNC: 15 U/L (ref 10–40)
BASOPHILS # BLD AUTO: 0.06 K/UL (ref 0–0.2)
BASOPHILS NFR BLD: 0.5 % (ref 0–1.9)
BILIRUB SERPL-MCNC: 0.4 MG/DL (ref 0.1–1)
BUN SERPL-MCNC: 28 MG/DL (ref 6–20)
CALCIUM SERPL-MCNC: 9 MG/DL (ref 8.7–10.5)
CHLORIDE SERPL-SCNC: 109 MMOL/L (ref 95–110)
CO2 SERPL-SCNC: 24 MMOL/L (ref 23–29)
CREAT SERPL-MCNC: 2.3 MG/DL (ref 0.5–1.4)
DIFFERENTIAL METHOD: ABNORMAL
EOSINOPHIL # BLD AUTO: 0.2 K/UL (ref 0–0.5)
EOSINOPHIL NFR BLD: 1.5 % (ref 0–8)
ERYTHROCYTE [DISTWIDTH] IN BLOOD BY AUTOMATED COUNT: 13.3 % (ref 11.5–14.5)
EST. GFR  (AFRICAN AMERICAN): 26.2 ML/MIN/1.73 M^2
EST. GFR  (NON AFRICAN AMERICAN): 22.7 ML/MIN/1.73 M^2
ESTIMATED AVG GLUCOSE: 117 MG/DL (ref 68–131)
GLUCOSE SERPL-MCNC: 122 MG/DL (ref 70–110)
HBA1C MFR BLD HPLC: 5.7 % (ref 4–5.6)
HCT VFR BLD AUTO: 36.4 % (ref 37–48.5)
HGB BLD-MCNC: 11 G/DL (ref 12–16)
IMM GRANULOCYTES # BLD AUTO: 0.07 K/UL (ref 0–0.04)
IMM GRANULOCYTES NFR BLD AUTO: 0.5 % (ref 0–0.5)
LYMPHOCYTES # BLD AUTO: 2.4 K/UL (ref 1–4.8)
LYMPHOCYTES NFR BLD: 19 % (ref 18–48)
MCH RBC QN AUTO: 26.6 PG (ref 27–31)
MCHC RBC AUTO-ENTMCNC: 30.2 G/DL (ref 32–36)
MCV RBC AUTO: 88 FL (ref 82–98)
MONOCYTES # BLD AUTO: 0.7 K/UL (ref 0.3–1)
MONOCYTES NFR BLD: 5.5 % (ref 4–15)
NEUTROPHILS # BLD AUTO: 9.4 K/UL (ref 1.8–7.7)
NEUTROPHILS NFR BLD: 73 % (ref 38–73)
NRBC BLD-RTO: 0 /100 WBC
PLATELET # BLD AUTO: 115 K/UL (ref 150–350)
PMV BLD AUTO: 11 FL (ref 9.2–12.9)
POTASSIUM SERPL-SCNC: 4.3 MMOL/L (ref 3.5–5.1)
PROT SERPL-MCNC: 8 G/DL (ref 6–8.4)
RBC # BLD AUTO: 4.13 M/UL (ref 4–5.4)
SODIUM SERPL-SCNC: 142 MMOL/L (ref 136–145)
WBC # BLD AUTO: 12.82 K/UL (ref 3.9–12.7)

## 2020-12-01 PROCEDURE — 36415 COLL VENOUS BLD VENIPUNCTURE: CPT

## 2020-12-01 PROCEDURE — 85025 COMPLETE CBC W/AUTO DIFF WBC: CPT

## 2020-12-01 PROCEDURE — 86703 HIV-1/HIV-2 1 RESULT ANTBDY: CPT

## 2020-12-01 PROCEDURE — 82306 VITAMIN D 25 HYDROXY: CPT

## 2020-12-01 PROCEDURE — 83036 HEMOGLOBIN GLYCOSYLATED A1C: CPT

## 2020-12-01 PROCEDURE — 80053 COMPREHEN METABOLIC PANEL: CPT

## 2020-12-02 LAB — HIV 1+2 AB+HIV1 P24 AG SERPL QL IA: NEGATIVE

## 2020-12-04 ENCOUNTER — OFFICE VISIT (OUTPATIENT)
Dept: INTERNAL MEDICINE | Facility: CLINIC | Age: 60
End: 2020-12-04
Payer: COMMERCIAL

## 2020-12-04 VITALS
DIASTOLIC BLOOD PRESSURE: 98 MMHG | BODY MASS INDEX: 36.83 KG/M2 | HEIGHT: 70 IN | HEART RATE: 76 BPM | OXYGEN SATURATION: 98 % | WEIGHT: 257.25 LBS | SYSTOLIC BLOOD PRESSURE: 146 MMHG

## 2020-12-04 DIAGNOSIS — F41.1 GENERALIZED ANXIETY DISORDER: ICD-10-CM

## 2020-12-04 DIAGNOSIS — N18.32 CKD STAGE G3B/A3, GFR 30-44 AND ALBUMIN CREATININE RATIO >300 MG/G: ICD-10-CM

## 2020-12-04 DIAGNOSIS — I15.0 RENOVASCULAR HYPERTENSION: ICD-10-CM

## 2020-12-04 DIAGNOSIS — N18.32 TYPE 2 DIABETES MELLITUS WITH STAGE 3B CHRONIC KIDNEY DISEASE, WITHOUT LONG-TERM CURRENT USE OF INSULIN: ICD-10-CM

## 2020-12-04 DIAGNOSIS — E66.01 CLASS 2 SEVERE OBESITY DUE TO EXCESS CALORIES WITH SERIOUS COMORBIDITY AND BODY MASS INDEX (BMI) OF 35.0 TO 35.9 IN ADULT: ICD-10-CM

## 2020-12-04 DIAGNOSIS — E11.42 DIABETIC POLYNEUROPATHY ASSOCIATED WITH TYPE 2 DIABETES MELLITUS: ICD-10-CM

## 2020-12-04 DIAGNOSIS — E11.311: ICD-10-CM

## 2020-12-04 DIAGNOSIS — E55.9 VITAMIN D DEFICIENCY: ICD-10-CM

## 2020-12-04 DIAGNOSIS — E89.0 POSTOPERATIVE HYPOTHYROIDISM: Primary | ICD-10-CM

## 2020-12-04 DIAGNOSIS — R80.9 NEPHROTIC RANGE PROTEINURIA: ICD-10-CM

## 2020-12-04 DIAGNOSIS — E11.22 TYPE 2 DIABETES MELLITUS WITH STAGE 3B CHRONIC KIDNEY DISEASE, WITHOUT LONG-TERM CURRENT USE OF INSULIN: ICD-10-CM

## 2020-12-04 DIAGNOSIS — E78.2 MIXED HYPERLIPIDEMIA: ICD-10-CM

## 2020-12-04 DIAGNOSIS — I50.32 CHRONIC DIASTOLIC HEART FAILURE: ICD-10-CM

## 2020-12-04 DIAGNOSIS — K21.9 GASTROESOPHAGEAL REFLUX DISEASE WITHOUT ESOPHAGITIS: ICD-10-CM

## 2020-12-04 DIAGNOSIS — D68.1 FACTOR XI DEFICIENCY: ICD-10-CM

## 2020-12-04 PROBLEM — S32.020A CLOSED COMPRESSION FRACTURE OF L2 VERTEBRA: Status: RESOLVED | Noted: 2019-05-24 | Resolved: 2020-12-04

## 2020-12-04 PROCEDURE — 3008F BODY MASS INDEX DOCD: CPT | Mod: CPTII,S$GLB,, | Performed by: INTERNAL MEDICINE

## 2020-12-04 PROCEDURE — 3077F SYST BP >= 140 MM HG: CPT | Mod: CPTII,S$GLB,, | Performed by: INTERNAL MEDICINE

## 2020-12-04 PROCEDURE — 3077F PR MOST RECENT SYSTOLIC BLOOD PRESSURE >= 140 MM HG: ICD-10-PCS | Mod: CPTII,S$GLB,, | Performed by: INTERNAL MEDICINE

## 2020-12-04 PROCEDURE — 3044F PR MOST RECENT HEMOGLOBIN A1C LEVEL <7.0%: ICD-10-PCS | Mod: CPTII,S$GLB,, | Performed by: INTERNAL MEDICINE

## 2020-12-04 PROCEDURE — 1126F PR PAIN SEVERITY QUANTIFIED, NO PAIN PRESENT: ICD-10-PCS | Mod: S$GLB,,, | Performed by: INTERNAL MEDICINE

## 2020-12-04 PROCEDURE — 3080F PR MOST RECENT DIASTOLIC BLOOD PRESSURE >= 90 MM HG: ICD-10-PCS | Mod: CPTII,S$GLB,, | Performed by: INTERNAL MEDICINE

## 2020-12-04 PROCEDURE — 99214 OFFICE O/P EST MOD 30 MIN: CPT | Mod: S$GLB,,, | Performed by: INTERNAL MEDICINE

## 2020-12-04 PROCEDURE — 99999 PR PBB SHADOW E&M-EST. PATIENT-LVL III: CPT | Mod: PBBFAC,,, | Performed by: INTERNAL MEDICINE

## 2020-12-04 PROCEDURE — 3080F DIAST BP >= 90 MM HG: CPT | Mod: CPTII,S$GLB,, | Performed by: INTERNAL MEDICINE

## 2020-12-04 PROCEDURE — 3008F PR BODY MASS INDEX (BMI) DOCUMENTED: ICD-10-PCS | Mod: CPTII,S$GLB,, | Performed by: INTERNAL MEDICINE

## 2020-12-04 PROCEDURE — 3044F HG A1C LEVEL LT 7.0%: CPT | Mod: CPTII,S$GLB,, | Performed by: INTERNAL MEDICINE

## 2020-12-04 PROCEDURE — 99999 PR PBB SHADOW E&M-EST. PATIENT-LVL III: ICD-10-PCS | Mod: PBBFAC,,, | Performed by: INTERNAL MEDICINE

## 2020-12-04 PROCEDURE — 1126F AMNT PAIN NOTED NONE PRSNT: CPT | Mod: S$GLB,,, | Performed by: INTERNAL MEDICINE

## 2020-12-04 PROCEDURE — 99214 PR OFFICE/OUTPT VISIT, EST, LEVL IV, 30-39 MIN: ICD-10-PCS | Mod: S$GLB,,, | Performed by: INTERNAL MEDICINE

## 2020-12-04 RX ORDER — TRAZODONE HYDROCHLORIDE 50 MG/1
50 TABLET ORAL NIGHTLY PRN
Qty: 90 TABLET | Refills: 3 | Status: SHIPPED | OUTPATIENT
Start: 2020-12-04 | End: 2021-03-03 | Stop reason: SDUPTHER

## 2020-12-04 RX ORDER — HYDRALAZINE HYDROCHLORIDE 50 MG/1
50 TABLET, FILM COATED ORAL 2 TIMES DAILY
Qty: 180 TABLET | Refills: 3 | Status: SHIPPED | OUTPATIENT
Start: 2020-12-04 | End: 2021-03-03

## 2020-12-04 RX ORDER — ESCITALOPRAM OXALATE 10 MG/1
10 TABLET ORAL DAILY
Qty: 90 TABLET | Refills: 3 | Status: SHIPPED | OUTPATIENT
Start: 2020-12-04 | End: 2021-03-03 | Stop reason: SDUPTHER

## 2020-12-04 RX ORDER — FUROSEMIDE 20 MG/1
20 TABLET ORAL DAILY
Qty: 90 TABLET | Refills: 3 | Status: SHIPPED | OUTPATIENT
Start: 2020-12-04 | End: 2021-03-03 | Stop reason: SDUPTHER

## 2020-12-04 RX ORDER — LEVOTHYROXINE SODIUM 200 UG/1
TABLET ORAL
Qty: 90 TABLET | Refills: 3 | Status: SHIPPED | OUTPATIENT
Start: 2020-12-04 | End: 2021-03-03 | Stop reason: SDUPTHER

## 2020-12-04 RX ORDER — METHOCARBAMOL 750 MG/1
750 TABLET, FILM COATED ORAL NIGHTLY PRN
Qty: 30 TABLET | Refills: 5 | Status: SHIPPED | OUTPATIENT
Start: 2020-12-04 | End: 2021-03-03

## 2020-12-04 RX ORDER — ATORVASTATIN CALCIUM 20 MG/1
40 TABLET, FILM COATED ORAL DAILY
Qty: 90 TABLET | Refills: 3 | Status: SHIPPED | OUTPATIENT
Start: 2020-12-04 | End: 2021-03-03 | Stop reason: SDUPTHER

## 2020-12-04 NOTE — PROGRESS NOTES
INTERNAL MEDICINE CLINIC  Follow-up Visit Progress Note     PRESENTING HISTORY     PCP: Elan Price MD    Last Clinic Visit with me: 1-8-2020    Current Chief Complaint/Problem: Follow up.    History of Present Illness & ROS: Ms. Kathie Quezada is a 60 y.o. female.    Follow up HTN.    Review of Systems:  Answers for HPI/ROS submitted by the patient on 12/1/2020   Hypertension  Chronicity: chronic  Onset: more than 1 year ago  Progression since onset: rapidly worsening  Condition status: resistant  anxiety: No  blurred vision: No  chest pain: Yes  headaches: Yes  malaise/fatigue: Yes  neck pain: Yes  orthopnea: Yes  palpitations: Yes  peripheral edema: No  PND: No  shortness of breath: Yes  sweats: No  Agents associated with hypertension: steroids, thyroid hormones  CAD risks: diabetes mellitus, family history, obesity  Compliance problems: no compliance problems  Past treatments: beta blockers, lifestyle changes  Improvement on treatment: moderate      PAST HISTORY:     Past Medical History:   Diagnosis Date    Acute respiratory failure with hypoxia and hypercapnia 5/26/2019    ATN (acute tubular necrosis) 2/8/2019    Chronic diastolic heart failure 2/7/2019 2-8-19 Mild left atrial enlargement. Mild left ventricular enlargement. Normal left ventricular systolic function. The estimated ejection fraction is 55% Indeterminate left ventricular diastolic function. Normal right ventricular systolic function. Mild mitral regurgitation. Mild to moderate tricuspid regurgitation. 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  7-31-19 Factor XI Activity 55 - 145 % 96       Generalized anxiety disorder 11/8/2019    GERD (gastroesophageal reflux disease)     GIB (gastrointestinal bleeding) 10/18/2018    History of hepatitis C     s/p succesful Rx w/ [facility]  - SVR12 (cure) 2016    Hypertensive retinopathy, bilateral 12/10/2019    Mild nonproliferative diabetic retinopathy 05/02/2018    Mixed hyperlipidemia 4/9/2018    NS (nuclear sclerosis), bilateral 12/10/2019    PNA (pneumonia) 5/28/2019    Postoperative hypothyroidism     Renovascular hypertension 4/9/2018    S/P kyphoplasty 8/14/2019 8/14/19: L2 kyphoplasty with Dr. Kwon     Slow transit constipation 5/27/2019    Type 2 diabetes mellitus with circulatory disorder, with long-term current use of insulin 4/9/2018    Type 2 diabetes mellitus with stage 3 chronic kidney disease, without long-term current use of insulin 4/9/2018       Past Surgical History:   Procedure Laterality Date    BACK SURGERY  2017    CHOLECYSTECTOMY  2015    [place]    COLONOSCOPY  11/13/2015    Dr Chino torres prep. internal hemorrhoids. diverticulosis of sigmois. NO polyps. repeat due 2025    FIXATION KYPHOPLASTY Bilateral 8/14/2019    Procedure: L2 kyphoplasty Fluoro Globus;  Surgeon: Jeremie Kwon DO;  Location: [facility] OR;  Service: Neurosurgery;  Laterality: Bilateral;  CROW MACRINA WINSLOW [phone] TEXTED HER @ 10:54AM ON 7-30-19  PRE-OP-BY RN  8-7-2019    HYSTERECTOMY      OOPHORECTOMY      one    THYROIDECTOMY  2016    at [facility] for thyroid nodule    TONSILLECTOMY         Family History   Problem Relation Age of Onset    Diabetes type II Mother        Social History     Socioeconomic History    Marital status:      Spouse name: Not on file    Number of children: Not on file    Years of education: Not on file    Highest education level: Not on file    Occupational History    Not on file   Social Needs    Financial resource strain: Somewhat hard    Food insecurity     Worry: Sometimes true     Inability: Sometimes true    Transportation needs     Medical: No     Non-medical: No   Tobacco Use    Smoking status: Never Smoker    Smokeless tobacco: Never Used   Substance and Sexual Activity    Alcohol use: Yes     Alcohol/week: 4.0 standard drinks     Types: 4 Cans of beer per week     Frequency: Monthly or less     Drinks per session: 1 or 2     Binge frequency: Never     Comment: weekly    Drug use: No    Sexual activity: Not on file   Lifestyle    Physical activity     Days per week: Not on file     Minutes per session: 30 min    Stress: Not at all   Relationships    Social connections     Talks on phone: More than three times a week     Gets together: More than three times a week     Attends Jainism service: Not on file     Active member of club or organization: Yes     Attends meetings of clubs or organizations: More than 4 times per year     Relationship status:    Other Topics Concern    Not on file   Social History Narrative    Pt enjoys cooking and baking and making Floral arrangement and Ker[employer]        MEDICATIONS & ALLERGIES:     Current Outpatient Medications on File Prior to Visit   Medication Sig Dispense Refill    amLODIPine (NORVASC) 10 MG tablet Take 1 tablet (10 mg total) by mouth once daily. 90 tablet 3    blood sugar diagnostic Strp To check BG 2 times daily, to use with insurance preferred meter 60 each 11    carvediloL (COREG) 25 MG tablet Take 1 tablet (25 mg total) by mouth 2 (two) times daily with meals. 180 tablet 3    ergocalciferol (ERGOCALCIFEROL) 50,000 unit Cap Take 1 capsule (50,000 Units total) by mouth every 7 days. 12 capsule 3    pantoprazole (PROTONIX) 40 MG tablet Take 1 tablet (40 mg total) by mouth once daily. 90 tablet 3    peg 400/hypromellose/glycerin (VISINE DRY EYE RELIEF OPHT) Apply 1  drop to eye 2 (two) times daily as needed (BOTH EYES).      sodium bicarbonate 325 MG tablet Take 4 tablets (1,300 mg total) by mouth 2 (two) times daily. 120 tablet 6     atorvastatin (LIPITOR) 20 MG tablet Take 2 tablets (40 mg total) by mouth once daily. 90 tablet 3     chlorthalidone (HYGROTEN) 50 MG Tab Take 1 tablet (50 mg total) by mouth once daily. 90 tablet 11     escitalopram oxalate (LEXAPRO) 10 MG tablet Take 1 tablet (10 mg total) by mouth once daily. 90 tablet 3     methocarbamoL (ROBAXIN) 750 MG Tab Take 1 tablet (750 mg total) by mouth nightly as needed (muscle spasms). 30 tablet 0            SYNTHROID 200 mcg tablet TAKE 1 TABLET(200 MCG) BY MOUTH EVERY DAY 30 tablet 0     traZODone (DESYREL) 50 MG tablet Take 1 tablet (50 mg total) by mouth nightly as needed for Insomnia. 90 tablet 3     No current facility-administered medications on file prior to visit.         Review of patient's allergies indicates:   Allergen Reactions    Mushroom Hives    Bactrim [sulfamethoxazole-trimethoprim] Hives    Codeine Hives    Gabapentin      Pruritis     Iodine     Peanut butter flavor     Penicillins Hives    Shellfish containing products Itching    Sulfa (sulfonamide antibiotics) Hives       Medications Reconciliation:   I have reconciled the patient's home medications and discharge medications with the patient/family. I have updated all changes.  Refer to After-Visit Medication List.    OBJECTIVE:     Vital Signs:  Vitals:    12/04/20 1042   BP: (!) 146/98   Pulse: 76     Wt Readings from Last 1 Encounters:   12/04/20 1042 116.7 kg (257 lb 4.4 oz)     Body mass index is 36.92 kg/m².     Physical Exam:  General: Well developed, well nourished. No distress.  HEENT: Head is normocephalic, atraumatic  Eyes: Clear conjunctiva.  Neck: Supple, symmetrical neck; trachea midline.  Lungs: Clear to auscultation bilaterally and normal respiratory effort.  Cardiovascular: Heart with regular rate and rhythm.     Extremities: Trace pedal edema.  Abdomen: Abdomen is soft, non-tender non-distended with normal bowel sounds.  Skin: Skin color, texture, turgor normal. No rashes.  Feet : mild loss of sensation, no breakdown.  Musculoskeletal: Normal gait.   Psychiatric: Normal affect. Alert.      Laboratory  Lab Results   Component Value Date    WBC 12.82 (H) 12/01/2020    HGB 11.0 (L) 12/01/2020    HCT 36.4 (L) 12/01/2020     (L) 12/01/2020    CHOL 162 11/03/2020    TRIG 84 11/03/2020    HDL 57 11/03/2020    ALT 12 12/01/2020    AST 15 12/01/2020     12/01/2020    K 4.3 12/01/2020     12/01/2020    CREATININE 2.3 (H) 12/01/2020    BUN 28 (H) 12/01/2020    CO2 24 12/01/2020    TSH 4.047 (H) 05/13/2020    INR 1.0 08/07/2019    HGBA1C 5.7 (H) 12/01/2020       ASSESSMENT & PLAN:     Chronic diastolic heart failure  CKD stage G3b/A3, GFR 30-44 and albumin creatinine ratio >300 mg/g  Nephrotic range proteinuria  Renovascular hypertension  - Compensated. BP controlled.     Could not get DIgital HTN to work for her.    Plan:  - [id number] mg daily    Coreg 25 mg BID         - Sodium bicarbonate 325 mg BID          Change HCTZ to Lasix 20 mg daily        Add Hydralazine 50 mg BID.      Orders:  -     furosemide (LASIX) 20 MG tablet; Take 1 tablet (20 mg total) by mouth once daily.  Dispense: 90 tablet; Refill: 3  -     hydrALAZINE (APRESOLINE) 50 MG tablet; Take 1 tablet (50 mg total) by mouth 2 (two) times a day.  Dispense: 180 tablet; Refill: 3    Vitamin D deficiency  -     ergocalciferol (ERGOCALCIFEROL) 50,000 unit Cap; Take 1 capsule (50,000 Units total) by mouth every 7 days.       Gastroesophageal reflux disease without esophagitis  -     pantoprazole (PROTONIX) 40 MG tablet; Take 1 tablet (40 mg total) by mouth once daily.      Postoperative hypothyroidism  -     levothyroxine (SYNTHROID) 200 MCG tablet; Take 1 tablet (200 mcg total) by mouth before breakfast.       Type 2 diabetes mellitus with stage 3  chronic kidney disease, without long-term current use of insulin  Diabetic Neuropathy  - Diet controlled. A1c 5.7     Mixed hyperlipidemia  - On Lipitor 20 mg daily.     Vitamin D Deficiency  - On Vitamin D 50,000 weekly.     Normocytic anemia  -   Improved. Normal B12 and iron.     Generalized anxiety disorder  -     escitalopram oxalate (LEXAPRO) 10 MG tablet; Take 1 tablet (10 mg total) by mouth once daily.    -     traZODone (DESYREL) 50 MG tablet; Take 1 tablet (50 mg total) by mouth nightly as needed for Insomnia.       Closed compression fracture of L2 lumbar vertebra s/p L2 kyphoplasty  - Doing well post procedure.   -     methocarbamoL (ROBAXIN) 750 MG Tab; Take 1 tablet (750 mg total) by mouth nightly as needed (muscle spasms).  Dispense: 30 tablet; Refill: 5    Factor XI deficiency  - Usually get  FFP 2 units right before surgery for Factor XI deficiency.    Ref. Range 7/31/2019 17:14   Factor XI Activity Latest Ref Range: 55 - 145 % 96         Preventive Health Maintenance:  Up to date.     Return to Clinic for Follow Up with me:  March 2021    Scheduled Follow-up :  Future Appointments   Date Time Provider Department Center   12/10/2020 11:15 [facility]   1/12/2021  9:20 AM SOFYA Reina MD [facility] [place]   3/3/2021 10:30 AM Elan Price MD [facility] [place] [facility]       After Visit Medication List :     Medication List          Accurate as of December 4, 2020 11:20 AM. If you have any questions, ask your nurse or doctor.            START taking these medications    furosemide 20 MG tablet  Commonly known as: LASIX  Take 1 tablet (20 mg total) by mouth once daily.  Started by: Elan Price MD     hydrALAZINE 50 MG tablet  Commonly known as: APRESOLINE  Take 1 tablet (50 mg total) by mouth 2 (two) times a day.  Started by: Elan Price MD        CONTINUE taking these medications    amLODIPine 10 MG tablet  Commonly known as: NORVASC  Take 1 tablet (10 mg total)  by mouth once daily.     atorvastatin 20 MG tablet  Commonly known as: LIPITOR  Take 2 tablets (40 mg total) by mouth once daily.     blood sugar diagnostic Strp  To check BG 2 times daily, to use with insurance preferred meter     carvediloL 25 MG tablet  Commonly known as: COREG  Take 1 tablet (25 mg total) by mouth 2 (two) times daily with meals.     ergocalciferol 50,000 unit Cap  Commonly known as: ERGOCALCIFEROL  Take 1 capsule (50,000 Units total) by mouth every 7 days.     escitalopram oxalate 10 MG tablet  Commonly known as: LEXAPRO  Take 1 tablet (10 mg total) by mouth once daily.     methocarbamoL 750 MG Tab  Commonly known as: ROBAXIN  Take 1 tablet (750 mg total) by mouth nightly as needed (muscle spasms).     pantoprazole 40 MG tablet  Commonly known as: PROTONIX  Take 1 tablet (40 mg total) by mouth once daily.     sodium bicarbonate 325 MG tablet  Take 4 tablets (1,300 mg total) by mouth 2 (two) times daily.     SYNTHROID 200 MCG tablet  Generic drug: levothyroxine  TAKE 1 TABLET(200 MCG) BY MOUTH EVERY DAY     traZODone 50 MG tablet  Commonly known as: DESYREL  Take 1 tablet (50 mg total) by mouth nightly as needed for Insomnia.     VISINE DRY EYE RELIEF OPHT        STOP taking these medications    chlorthalidone 50 MG Tab  Commonly known as: HYGROTEN  Stopped by: Elan Price MD     ondansetron 8 MG tablet  Commonly known as: ZOFRAN  Stopped by: Elan Price MD           Where to Get Your Medications      These medications [facility] to [id number] [address] AT [address] [address]    Phone: [phone]   · atorvastatin 20 MG tablet  · escitalopram oxalate 10 MG tablet  · furosemide 20 MG tablet  · hydrALAZINE 50 MG tablet  · methocarbamoL 750 MG Tab  · SYNTHROID 200 MCG tablet  · traZODone 50 MG tablet         Signing Physician:  Elan Price MD

## 2020-12-11 ENCOUNTER — PATIENT MESSAGE (OUTPATIENT)
Dept: OTHER | Facility: OTHER | Age: 60
End: 2020-12-11

## 2021-01-04 ENCOUNTER — PATIENT MESSAGE (OUTPATIENT)
Dept: ADMINISTRATIVE | Facility: HOSPITAL | Age: 61
End: 2021-01-04

## 2021-01-10 ENCOUNTER — PATIENT OUTREACH (OUTPATIENT)
Dept: ADMINISTRATIVE | Facility: OTHER | Age: 61
End: 2021-01-10

## 2021-01-12 NOTE — PLAN OF CARE
58-year-old male follows up regarding his COVID-19 infection from this fall.  He continues to improve it is back to work.  Last week he worked essentially normal week without significant problems.  No fevers or cough.  He still has a little bit of a stuffy nose which may be is chronic sinus issues.  Denies chest pain and his energy is improving along with his breathing.  He has noticed that his heart rate when checked at home is usually between 97 and 106. Indeed we have an elevated resting heart rate of about 100. It is regular.    I therefore did an EKG blood pressure is essentially unremarkable a heart rate of 92, no ischemia or arrhythmia.  Reassurance was given, has not been anemic.    Past medical surgical history reviewed and updated.    Current Outpatient Medications   Medication Sig Dispense Refill   • metformin (GLUCOPHAGE) 1000 MG tablet TAKE 1 TABLET BY MOUTH TWICE DAILY WITH MEALS 60 tablet 0   • tamsulosin (FLOMAX) 0.4 MG Cap Take 1 capsule by mouth 2 times daily. 180 capsule 0   • loratadine-pseudoephedrine (Claritin-D 24 Hour)  MG per 24 hr tablet Take 1 tablet by mouth daily as needed for Allergies. 30 tablet 5   • lisinopril (ZESTRIL) 20 MG tablet Take 1 tablet by mouth daily. 90 tablet 1   • albuterol 108 (90 Base) MCG/ACT inhaler Inhale 2 puffs into the lungs every 4 hours as needed for Shortness of Breath or Wheezing. 1 Inhaler 0   • TRULICITY 0.75 MG/0.5ML pen-injector Inject 0.75 mg into the skin 1 day a week. 2 mL 3   • blood glucose (ONE TOUCH ULTRA TEST) test strip Test blood sugar 2 times daily as directed. Diagnosis: E11.9. 30 strip 11   • blood glucose meter Please dispense one touch ultra glucose meter, test strips and lancets. Test blood sugar 2 times daily.. Diagnosis: E11.65 1 kit 0   • aspirin (ASPIR-LOW) 81 MG EC tablet Take 1 tablet by mouth daily. 0 30 tablet 0     No current facility-administered medications for this visit.      Physical exam:Blood pressure 116/80, pulse  Problem: Adult Inpatient Plan of Care  Goal: Plan of Care Review  Outcome: Ongoing (interventions implemented as appropriate)  Patient resting in bed comfortably. IV intact free of infection and irration,  fall precautions maintained no falls noted. Call light in reach bed locked and in lowest position. Non skid socks on while out of bed. Patient instructed to call for assistance. Skin integrity maintained as patient is independent with frequent positioning, C/o pain managed with PRN meds, No other complaints or concerns. Progressing towards goals pt is able to tolerate more movement, Will continue to monitor and follow careplan of care.           94, temperature 97.5 °F (36.4 °C), weight 117.4 kg, SpO2 96 %.    Alert, oriented, in no distress.  BMI 35  Lungs are entirely clear without wheezes or crackles  CV:  Rapid but regular without murmur.  Normal pulse ox.    EKG:  Sinus rhythm, normal    Assessment and plan:  Follow-up COVID-19 infection/pneumonia.  Resolved.  Breathing energy of improved, he may return to work without restrictions.  Sinus tachycardia:  Asymptomatic.  Transient.  Follow-up for his other medical problems in 3 months with preclinic lab work.  Discharged in stable condition.

## 2021-03-03 ENCOUNTER — OFFICE VISIT (OUTPATIENT)
Dept: INTERNAL MEDICINE | Facility: CLINIC | Age: 61
End: 2021-03-03
Payer: COMMERCIAL

## 2021-03-03 DIAGNOSIS — K21.9 GASTROESOPHAGEAL REFLUX DISEASE WITHOUT ESOPHAGITIS: ICD-10-CM

## 2021-03-03 DIAGNOSIS — I50.32 CHRONIC DIASTOLIC HEART FAILURE: Primary | ICD-10-CM

## 2021-03-03 DIAGNOSIS — E55.9 VITAMIN D DEFICIENCY: ICD-10-CM

## 2021-03-03 DIAGNOSIS — E66.01 CLASS 2 SEVERE OBESITY DUE TO EXCESS CALORIES WITH SERIOUS COMORBIDITY AND BODY MASS INDEX (BMI) OF 35.0 TO 35.9 IN ADULT: ICD-10-CM

## 2021-03-03 DIAGNOSIS — N18.31 TYPE 2 DIABETES MELLITUS WITH STAGE 3A CHRONIC KIDNEY DISEASE, WITHOUT LONG-TERM CURRENT USE OF INSULIN: ICD-10-CM

## 2021-03-03 DIAGNOSIS — E11.42 DIABETIC POLYNEUROPATHY ASSOCIATED WITH TYPE 2 DIABETES MELLITUS: ICD-10-CM

## 2021-03-03 DIAGNOSIS — E89.0 POSTOPERATIVE HYPOTHYROIDISM: ICD-10-CM

## 2021-03-03 DIAGNOSIS — N18.32 CKD STAGE G3B/A3, GFR 30-44 AND ALBUMIN CREATININE RATIO >300 MG/G: ICD-10-CM

## 2021-03-03 DIAGNOSIS — F41.1 GENERALIZED ANXIETY DISORDER: ICD-10-CM

## 2021-03-03 DIAGNOSIS — E78.2 MIXED HYPERLIPIDEMIA: ICD-10-CM

## 2021-03-03 DIAGNOSIS — R80.9 NEPHROTIC RANGE PROTEINURIA: ICD-10-CM

## 2021-03-03 DIAGNOSIS — E11.22 TYPE 2 DIABETES MELLITUS WITH STAGE 3A CHRONIC KIDNEY DISEASE, WITHOUT LONG-TERM CURRENT USE OF INSULIN: ICD-10-CM

## 2021-03-03 DIAGNOSIS — I15.0 RENOVASCULAR HYPERTENSION: ICD-10-CM

## 2021-03-03 DIAGNOSIS — D68.1 FACTOR XI DEFICIENCY: ICD-10-CM

## 2021-03-03 DIAGNOSIS — E11.3553 CONTROLLED TYPE 2 DIABETES MELLITUS WITH STABLE PROLIFERATIVE RETINOPATHY OF BOTH EYES, UNSPECIFIED WHETHER LONG TERM INSULIN USE: ICD-10-CM

## 2021-03-03 PROBLEM — H35.033 HYPERTENSIVE RETINOPATHY, BILATERAL: Status: RESOLVED | Noted: 2019-12-10 | Resolved: 2021-03-03

## 2021-03-03 PROCEDURE — 3078F PR MOST RECENT DIASTOLIC BLOOD PRESSURE < 80 MM HG: ICD-10-PCS | Mod: CPTII,S$GLB,, | Performed by: INTERNAL MEDICINE

## 2021-03-03 PROCEDURE — 99499 UNLISTED E&M SERVICE: CPT | Mod: S$GLB,,, | Performed by: INTERNAL MEDICINE

## 2021-03-03 PROCEDURE — 3044F PR MOST RECENT HEMOGLOBIN A1C LEVEL <7.0%: ICD-10-PCS | Mod: CPTII,S$GLB,, | Performed by: INTERNAL MEDICINE

## 2021-03-03 PROCEDURE — 99999 PR PBB SHADOW E&M-EST. PATIENT-LVL III: CPT | Mod: PBBFAC,,, | Performed by: INTERNAL MEDICINE

## 2021-03-03 PROCEDURE — 99214 OFFICE O/P EST MOD 30 MIN: CPT | Mod: S$GLB,,, | Performed by: INTERNAL MEDICINE

## 2021-03-03 PROCEDURE — 99999 PR PBB SHADOW E&M-EST. PATIENT-LVL III: ICD-10-PCS | Mod: PBBFAC,,, | Performed by: INTERNAL MEDICINE

## 2021-03-03 PROCEDURE — 3074F SYST BP LT 130 MM HG: CPT | Mod: CPTII,S$GLB,, | Performed by: INTERNAL MEDICINE

## 2021-03-03 PROCEDURE — 99214 PR OFFICE/OUTPT VISIT, EST, LEVL IV, 30-39 MIN: ICD-10-PCS | Mod: S$GLB,,, | Performed by: INTERNAL MEDICINE

## 2021-03-03 PROCEDURE — 3074F PR MOST RECENT SYSTOLIC BLOOD PRESSURE < 130 MM HG: ICD-10-PCS | Mod: CPTII,S$GLB,, | Performed by: INTERNAL MEDICINE

## 2021-03-03 PROCEDURE — 3044F HG A1C LEVEL LT 7.0%: CPT | Mod: CPTII,S$GLB,, | Performed by: INTERNAL MEDICINE

## 2021-03-03 PROCEDURE — 99499 RISK ADDL DX/OHS AUDIT: ICD-10-PCS | Mod: S$GLB,,, | Performed by: INTERNAL MEDICINE

## 2021-03-03 PROCEDURE — 3078F DIAST BP <80 MM HG: CPT | Mod: CPTII,S$GLB,, | Performed by: INTERNAL MEDICINE

## 2021-03-03 RX ORDER — LEVOTHYROXINE SODIUM 200 UG/1
200 TABLET ORAL
Qty: 90 TABLET | Refills: 3 | Status: SHIPPED | OUTPATIENT
Start: 2021-03-03 | End: 2021-03-03 | Stop reason: SDUPTHER

## 2021-03-03 RX ORDER — LEVOTHYROXINE SODIUM 200 UG/1
200 TABLET ORAL
Qty: 90 TABLET | Refills: 3 | Status: SHIPPED | OUTPATIENT
Start: 2021-03-03 | End: 2021-04-26 | Stop reason: SDUPTHER

## 2021-03-03 RX ORDER — ESCITALOPRAM OXALATE 10 MG/1
10 TABLET ORAL DAILY
Qty: 90 TABLET | Refills: 3 | Status: SHIPPED | OUTPATIENT
Start: 2021-03-03 | End: 2021-11-19 | Stop reason: SDUPTHER

## 2021-03-03 RX ORDER — FUROSEMIDE 20 MG/1
20 TABLET ORAL DAILY
Qty: 90 TABLET | Refills: 3 | Status: SHIPPED | OUTPATIENT
Start: 2021-03-03 | End: 2021-11-19 | Stop reason: SDUPTHER

## 2021-03-03 RX ORDER — AMLODIPINE BESYLATE 10 MG/1
10 TABLET ORAL DAILY
Qty: 90 TABLET | Refills: 3 | Status: SHIPPED | OUTPATIENT
Start: 2021-03-03 | End: 2021-11-19 | Stop reason: SDUPTHER

## 2021-03-03 RX ORDER — ERGOCALCIFEROL 1.25 MG/1
50000 CAPSULE ORAL
Qty: 12 CAPSULE | Refills: 3 | Status: SHIPPED | OUTPATIENT
Start: 2021-03-03 | End: 2021-07-23

## 2021-03-03 RX ORDER — TRAZODONE HYDROCHLORIDE 50 MG/1
50 TABLET ORAL NIGHTLY PRN
Qty: 90 TABLET | Refills: 3 | Status: SHIPPED | OUTPATIENT
Start: 2021-03-03 | End: 2021-11-19 | Stop reason: SDUPTHER

## 2021-03-03 RX ORDER — ATORVASTATIN CALCIUM 10 MG/1
10 TABLET, FILM COATED ORAL DAILY
Qty: 90 TABLET | Refills: 3 | Status: SHIPPED | OUTPATIENT
Start: 2021-03-03 | End: 2021-11-19 | Stop reason: SDUPTHER

## 2021-03-03 RX ORDER — CARVEDILOL 25 MG/1
25 TABLET ORAL 2 TIMES DAILY WITH MEALS
Qty: 180 TABLET | Refills: 3 | Status: SHIPPED | OUTPATIENT
Start: 2021-03-03 | End: 2021-11-19 | Stop reason: SDUPTHER

## 2021-03-05 ENCOUNTER — IMMUNIZATION (OUTPATIENT)
Dept: PRIMARY CARE CLINIC | Facility: CLINIC | Age: 61
End: 2021-03-05
Payer: MEDICARE

## 2021-03-05 DIAGNOSIS — Z23 NEED FOR VACCINATION: Primary | ICD-10-CM

## 2021-03-05 PROCEDURE — 0031A COVID-19,VECTOR-NR,RS-AD26,PF,0.5 ML DOSE VACCINE (JANSSEN): CPT | Mod: PBBFAC | Performed by: EMERGENCY MEDICINE

## 2021-03-17 ENCOUNTER — PATIENT MESSAGE (OUTPATIENT)
Dept: ADMINISTRATIVE | Facility: OTHER | Age: 61
End: 2021-03-17

## 2021-03-22 DIAGNOSIS — E11.9 TYPE 2 DIABETES MELLITUS WITHOUT COMPLICATION, WITHOUT LONG-TERM CURRENT USE OF INSULIN: ICD-10-CM

## 2021-04-05 ENCOUNTER — PATIENT MESSAGE (OUTPATIENT)
Dept: ADMINISTRATIVE | Facility: HOSPITAL | Age: 61
End: 2021-04-05

## 2021-04-13 ENCOUNTER — NURSE TRIAGE (OUTPATIENT)
Dept: ADMINISTRATIVE | Facility: CLINIC | Age: 61
End: 2021-04-13

## 2021-04-13 ENCOUNTER — HOSPITAL ENCOUNTER (EMERGENCY)
Facility: HOSPITAL | Age: 61
Discharge: HOME | End: 2021-04-13
Attending: EMERGENCY MEDICINE
Payer: COMMERCIAL

## 2021-04-13 VITALS
TEMPERATURE: 98 F | RESPIRATION RATE: 18 BRPM | HEART RATE: 72 BPM | SYSTOLIC BLOOD PRESSURE: 177 MMHG | OXYGEN SATURATION: 97 % | DIASTOLIC BLOOD PRESSURE: 79 MMHG

## 2021-04-13 DIAGNOSIS — R06.00 DYSPNEA, UNSPECIFIED TYPE: ICD-10-CM

## 2021-04-13 DIAGNOSIS — R06.02 SHORTNESS OF BREATH: ICD-10-CM

## 2021-04-13 DIAGNOSIS — R51.9 NONINTRACTABLE HEADACHE, UNSPECIFIED CHRONICITY PATTERN, UNSPECIFIED HEADACHE TYPE: Primary | ICD-10-CM

## 2021-04-13 LAB
ALBUMIN SERPL BCP-MCNC: 3.7 G/DL (ref 3.5–5.2)
ALP SERPL-CCNC: 122 U/L (ref 55–135)
ALT SERPL W/O P-5'-P-CCNC: 18 U/L (ref 10–44)
ANION GAP SERPL CALC-SCNC: 11 MMOL/L (ref 8–16)
APTT BLDCRRT: 36.1 SEC (ref 21–32)
AST SERPL-CCNC: 20 U/L (ref 10–40)
BASOPHILS # BLD AUTO: 0.05 K/UL (ref 0–0.2)
BASOPHILS NFR BLD: 0.4 % (ref 0–1.9)
BILIRUB SERPL-MCNC: 0.4 MG/DL (ref 0.1–1)
BNP SERPL-MCNC: 96 PG/ML (ref 0–99)
BUN SERPL-MCNC: 42 MG/DL (ref 6–30)
BUN SERPL-MCNC: 43 MG/DL (ref 6–20)
CALCIUM SERPL-MCNC: 8.5 MG/DL (ref 8.7–10.5)
CHLORIDE SERPL-SCNC: 110 MMOL/L (ref 95–110)
CHLORIDE SERPL-SCNC: 111 MMOL/L (ref 95–110)
CO2 SERPL-SCNC: 17 MMOL/L (ref 23–29)
CREAT SERPL-MCNC: 2.8 MG/DL (ref 0.5–1.4)
CREAT SERPL-MCNC: 3 MG/DL (ref 0.5–1.4)
DIFFERENTIAL METHOD: ABNORMAL
EOSINOPHIL # BLD AUTO: 0.2 K/UL (ref 0–0.5)
EOSINOPHIL NFR BLD: 1.7 % (ref 0–8)
ERYTHROCYTE [DISTWIDTH] IN BLOOD BY AUTOMATED COUNT: 15.8 % (ref 11.5–14.5)
EST. GFR  (AFRICAN AMERICAN): 20.5 ML/MIN/1.73 M^2
EST. GFR  (NON AFRICAN AMERICAN): 17.8 ML/MIN/1.73 M^2
GLUCOSE SERPL-MCNC: 101 MG/DL (ref 70–110)
GLUCOSE SERPL-MCNC: 103 MG/DL (ref 70–110)
HCT VFR BLD AUTO: 33.1 % (ref 37–48.5)
HCT VFR BLD CALC: 32 %PCV (ref 36–54)
HGB BLD-MCNC: 10.5 G/DL (ref 12–16)
IMM GRANULOCYTES # BLD AUTO: 0.05 K/UL (ref 0–0.04)
IMM GRANULOCYTES NFR BLD AUTO: 0.4 % (ref 0–0.5)
INR PPP: 1 (ref 0.8–1.2)
LYMPHOCYTES # BLD AUTO: 2.8 K/UL (ref 1–4.8)
LYMPHOCYTES NFR BLD: 20.6 % (ref 18–48)
MAGNESIUM SERPL-MCNC: 1.8 MG/DL (ref 1.6–2.6)
MCH RBC QN AUTO: 25.3 PG (ref 27–31)
MCHC RBC AUTO-ENTMCNC: 31.7 G/DL (ref 32–36)
MCV RBC AUTO: 80 FL (ref 82–98)
MONOCYTES # BLD AUTO: 0.9 K/UL (ref 0.3–1)
MONOCYTES NFR BLD: 6.8 % (ref 4–15)
NEUTROPHILS # BLD AUTO: 9.4 K/UL (ref 1.8–7.7)
NEUTROPHILS NFR BLD: 70.1 % (ref 38–73)
NRBC BLD-RTO: 0 /100 WBC
PLATELET # BLD AUTO: 204 K/UL (ref 150–450)
PMV BLD AUTO: 10.3 FL (ref 9.2–12.9)
POC IONIZED CALCIUM: 1.13 MMOL/L (ref 1.06–1.42)
POC TCO2 (MEASURED): 21 MMOL/L (ref 23–29)
POTASSIUM BLD-SCNC: 3.8 MMOL/L (ref 3.5–5.1)
POTASSIUM SERPL-SCNC: 3.9 MMOL/L (ref 3.5–5.1)
PROT SERPL-MCNC: 8.4 G/DL (ref 6–8.4)
PROTHROMBIN TIME: 11.2 SEC (ref 9–12.5)
RBC # BLD AUTO: 4.15 M/UL (ref 4–5.4)
SAMPLE: ABNORMAL
SODIUM BLD-SCNC: 140 MMOL/L (ref 136–145)
SODIUM SERPL-SCNC: 138 MMOL/L (ref 136–145)
TROPONIN I SERPL DL<=0.01 NG/ML-MCNC: 0.01 NG/ML (ref 0–0.03)
WBC # BLD AUTO: 13.43 K/UL (ref 3.9–12.7)

## 2021-04-13 PROCEDURE — 83880 ASSAY OF NATRIURETIC PEPTIDE: CPT | Performed by: PHYSICIAN ASSISTANT

## 2021-04-13 PROCEDURE — 99284 EMERGENCY DEPT VISIT MOD MDM: CPT | Mod: ,,, | Performed by: PHYSICIAN ASSISTANT

## 2021-04-13 PROCEDURE — 25000003 PHARM REV CODE 250: Performed by: PHYSICIAN ASSISTANT

## 2021-04-13 PROCEDURE — 83735 ASSAY OF MAGNESIUM: CPT | Performed by: PHYSICIAN ASSISTANT

## 2021-04-13 PROCEDURE — 85025 COMPLETE CBC W/AUTO DIFF WBC: CPT | Performed by: PHYSICIAN ASSISTANT

## 2021-04-13 PROCEDURE — 85730 THROMBOPLASTIN TIME PARTIAL: CPT | Performed by: PHYSICIAN ASSISTANT

## 2021-04-13 PROCEDURE — 85610 PROTHROMBIN TIME: CPT | Performed by: PHYSICIAN ASSISTANT

## 2021-04-13 PROCEDURE — 84484 ASSAY OF TROPONIN QUANT: CPT | Performed by: PHYSICIAN ASSISTANT

## 2021-04-13 PROCEDURE — 99285 EMERGENCY DEPT VISIT HI MDM: CPT | Mod: 25

## 2021-04-13 PROCEDURE — 93010 EKG 12-LEAD: ICD-10-PCS | Mod: ,,, | Performed by: INTERNAL MEDICINE

## 2021-04-13 PROCEDURE — 93005 ELECTROCARDIOGRAM TRACING: CPT

## 2021-04-13 PROCEDURE — 99284 PR EMERGENCY DEPT VISIT,LEVEL IV: ICD-10-PCS | Mod: ,,, | Performed by: PHYSICIAN ASSISTANT

## 2021-04-13 PROCEDURE — 93010 ELECTROCARDIOGRAM REPORT: CPT | Mod: ,,, | Performed by: INTERNAL MEDICINE

## 2021-04-13 PROCEDURE — 80053 COMPREHEN METABOLIC PANEL: CPT | Performed by: PHYSICIAN ASSISTANT

## 2021-04-13 RX ORDER — ACETAMINOPHEN 500 MG
1000 TABLET ORAL
Status: COMPLETED | OUTPATIENT
Start: 2021-04-13 | End: 2021-04-13

## 2021-04-13 RX ADMIN — ACETAMINOPHEN 1000 MG: 500 TABLET, FILM COATED ORAL at 09:04

## 2021-04-14 ENCOUNTER — PES CALL (OUTPATIENT)
Dept: ADMINISTRATIVE | Facility: CLINIC | Age: 61
End: 2021-04-14

## 2021-04-26 ENCOUNTER — OFFICE VISIT (OUTPATIENT)
Dept: INTERNAL MEDICINE | Facility: CLINIC | Age: 61
End: 2021-04-26
Payer: COMMERCIAL

## 2021-04-26 VITALS
SYSTOLIC BLOOD PRESSURE: 130 MMHG | HEIGHT: 70 IN | DIASTOLIC BLOOD PRESSURE: 60 MMHG | WEIGHT: 256.63 LBS | BODY MASS INDEX: 36.74 KG/M2 | OXYGEN SATURATION: 97 % | HEART RATE: 73 BPM | RESPIRATION RATE: 16 BRPM

## 2021-04-26 DIAGNOSIS — E78.2 MIXED HYPERLIPIDEMIA: ICD-10-CM

## 2021-04-26 DIAGNOSIS — R04.0 EPISTAXIS: ICD-10-CM

## 2021-04-26 DIAGNOSIS — E11.22 TYPE 2 DIABETES MELLITUS WITH STAGE 3A CHRONIC KIDNEY DISEASE, WITHOUT LONG-TERM CURRENT USE OF INSULIN: ICD-10-CM

## 2021-04-26 DIAGNOSIS — E55.9 VITAMIN D DEFICIENCY: ICD-10-CM

## 2021-04-26 DIAGNOSIS — Z12.31 ENCOUNTER FOR SCREENING MAMMOGRAM FOR BREAST CANCER: ICD-10-CM

## 2021-04-26 DIAGNOSIS — I50.32 CHRONIC DIASTOLIC HEART FAILURE: Primary | ICD-10-CM

## 2021-04-26 DIAGNOSIS — E89.0 POSTOPERATIVE HYPOTHYROIDISM: ICD-10-CM

## 2021-04-26 DIAGNOSIS — E11.42 DIABETIC POLYNEUROPATHY ASSOCIATED WITH TYPE 2 DIABETES MELLITUS: ICD-10-CM

## 2021-04-26 DIAGNOSIS — F41.1 GENERALIZED ANXIETY DISORDER: ICD-10-CM

## 2021-04-26 DIAGNOSIS — I15.0 RENOVASCULAR HYPERTENSION: ICD-10-CM

## 2021-04-26 DIAGNOSIS — N18.31 TYPE 2 DIABETES MELLITUS WITH STAGE 3A CHRONIC KIDNEY DISEASE, WITHOUT LONG-TERM CURRENT USE OF INSULIN: ICD-10-CM

## 2021-04-26 DIAGNOSIS — D68.1 FACTOR XI DEFICIENCY: ICD-10-CM

## 2021-04-26 DIAGNOSIS — R80.9 NEPHROTIC RANGE PROTEINURIA: ICD-10-CM

## 2021-04-26 DIAGNOSIS — N18.32 CKD STAGE G3B/A3, GFR 30-44 AND ALBUMIN CREATININE RATIO >300 MG/G: ICD-10-CM

## 2021-04-26 PROCEDURE — 1125F AMNT PAIN NOTED PAIN PRSNT: CPT | Mod: S$GLB,,, | Performed by: INTERNAL MEDICINE

## 2021-04-26 PROCEDURE — 3008F BODY MASS INDEX DOCD: CPT | Mod: CPTII,S$GLB,, | Performed by: INTERNAL MEDICINE

## 2021-04-26 PROCEDURE — 1125F PR PAIN SEVERITY QUANTIFIED, PAIN PRESENT: ICD-10-PCS | Mod: S$GLB,,, | Performed by: INTERNAL MEDICINE

## 2021-04-26 PROCEDURE — 3008F PR BODY MASS INDEX (BMI) DOCUMENTED: ICD-10-PCS | Mod: CPTII,S$GLB,, | Performed by: INTERNAL MEDICINE

## 2021-04-26 PROCEDURE — 99214 PR OFFICE/OUTPT VISIT, EST, LEVL IV, 30-39 MIN: ICD-10-PCS | Mod: S$GLB,,, | Performed by: INTERNAL MEDICINE

## 2021-04-26 PROCEDURE — 99999 PR PBB SHADOW E&M-EST. PATIENT-LVL IV: ICD-10-PCS | Mod: PBBFAC,,, | Performed by: INTERNAL MEDICINE

## 2021-04-26 PROCEDURE — 99214 OFFICE O/P EST MOD 30 MIN: CPT | Mod: S$GLB,,, | Performed by: INTERNAL MEDICINE

## 2021-04-26 PROCEDURE — 99999 PR PBB SHADOW E&M-EST. PATIENT-LVL IV: CPT | Mod: PBBFAC,,, | Performed by: INTERNAL MEDICINE

## 2021-04-26 RX ORDER — DOXYCYCLINE HYCLATE 100 MG
100 TABLET ORAL EVERY 12 HOURS
Qty: 20 TABLET | Refills: 0 | Status: SHIPPED | OUTPATIENT
Start: 2021-04-26 | End: 2021-07-23 | Stop reason: ALTCHOICE

## 2021-04-26 RX ORDER — LEVOTHYROXINE SODIUM 200 UG/1
200 TABLET ORAL
Qty: 90 TABLET | Refills: 3 | Status: SHIPPED | OUTPATIENT
Start: 2021-04-26 | End: 2021-11-19 | Stop reason: SDUPTHER

## 2021-04-26 RX ORDER — LEVOTHYROXINE SODIUM 200 UG/1
200 TABLET ORAL
Qty: 15 TABLET | Refills: 0 | Status: SHIPPED | OUTPATIENT
Start: 2021-04-26 | End: 2021-05-11

## 2021-05-09 ENCOUNTER — NURSE TRIAGE (OUTPATIENT)
Dept: ADMINISTRATIVE | Facility: CLINIC | Age: 61
End: 2021-05-09

## 2021-05-09 ENCOUNTER — PATIENT MESSAGE (OUTPATIENT)
Dept: INTERNAL MEDICINE | Facility: CLINIC | Age: 61
End: 2021-05-09

## 2021-05-11 ENCOUNTER — HOSPITAL ENCOUNTER (EMERGENCY)
Facility: HOSPITAL | Age: 61
Discharge: HOME | End: 2021-05-11
Attending: EMERGENCY MEDICINE
Payer: COMMERCIAL

## 2021-05-11 VITALS
BODY MASS INDEX: 33.6 KG/M2 | HEIGHT: 71 IN | HEART RATE: 76 BPM | TEMPERATURE: 98 F | WEIGHT: 240 LBS | RESPIRATION RATE: 18 BRPM | DIASTOLIC BLOOD PRESSURE: 87 MMHG | OXYGEN SATURATION: 96 % | SYSTOLIC BLOOD PRESSURE: 175 MMHG

## 2021-05-11 DIAGNOSIS — R42 DIZZINESS: ICD-10-CM

## 2021-05-11 DIAGNOSIS — R04.0 RECURRENT EPISTAXIS: Primary | ICD-10-CM

## 2021-05-11 PROCEDURE — 93005 ELECTROCARDIOGRAM TRACING: CPT

## 2021-05-11 PROCEDURE — 93010 EKG 12-LEAD: ICD-10-PCS | Mod: ,,, | Performed by: INTERNAL MEDICINE

## 2021-05-11 PROCEDURE — 99282 PR EMERGENCY DEPT VISIT,LEVEL II: ICD-10-PCS | Mod: ,,, | Performed by: EMERGENCY MEDICINE

## 2021-05-11 PROCEDURE — 93010 ELECTROCARDIOGRAM REPORT: CPT | Mod: ,,, | Performed by: INTERNAL MEDICINE

## 2021-05-11 PROCEDURE — 25000003 PHARM REV CODE 250: Performed by: STUDENT IN AN ORGANIZED HEALTH CARE EDUCATION/TRAINING PROGRAM

## 2021-05-11 PROCEDURE — 99282 EMERGENCY DEPT VISIT SF MDM: CPT | Mod: ,,, | Performed by: EMERGENCY MEDICINE

## 2021-05-11 PROCEDURE — 99283 EMERGENCY DEPT VISIT LOW MDM: CPT | Mod: 25

## 2021-05-11 RX ORDER — ACETAMINOPHEN 325 MG/1
650 TABLET ORAL
Status: COMPLETED | OUTPATIENT
Start: 2021-05-11 | End: 2021-05-11

## 2021-05-11 RX ADMIN — ACETAMINOPHEN 650 MG: 325 TABLET ORAL at 06:05

## 2021-06-08 PROCEDURE — 80047 BASIC METABLC PNL IONIZED CA: CPT

## 2021-06-08 PROCEDURE — 99284 EMERGENCY DEPT VISIT MOD MDM: CPT | Mod: 25

## 2021-06-08 PROCEDURE — 99284 PR EMERGENCY DEPT VISIT,LEVEL IV: ICD-10-PCS | Mod: ,,, | Performed by: EMERGENCY MEDICINE

## 2021-06-08 PROCEDURE — 99284 EMERGENCY DEPT VISIT MOD MDM: CPT | Mod: ,,, | Performed by: EMERGENCY MEDICINE

## 2021-06-09 ENCOUNTER — HOSPITAL ENCOUNTER (EMERGENCY)
Facility: HOSPITAL | Age: 61
Discharge: HOME | End: 2021-06-09
Attending: EMERGENCY MEDICINE
Payer: COMMERCIAL

## 2021-06-09 VITALS
DIASTOLIC BLOOD PRESSURE: 79 MMHG | SYSTOLIC BLOOD PRESSURE: 154 MMHG | OXYGEN SATURATION: 98 % | HEART RATE: 68 BPM | TEMPERATURE: 98 F | RESPIRATION RATE: 16 BRPM

## 2021-06-09 DIAGNOSIS — R31.9 HEMATURIA, UNSPECIFIED TYPE: ICD-10-CM

## 2021-06-09 DIAGNOSIS — R10.9 FLANK PAIN: Primary | ICD-10-CM

## 2021-06-09 LAB
ALBUMIN SERPL BCP-MCNC: 3.5 G/DL (ref 3.5–5.2)
ALP SERPL-CCNC: 132 U/L (ref 55–135)
ALT SERPL W/O P-5'-P-CCNC: 20 U/L (ref 10–44)
ANION GAP SERPL CALC-SCNC: 11 MMOL/L (ref 8–16)
AST SERPL-CCNC: 24 U/L (ref 10–40)
BASOPHILS # BLD AUTO: 0.07 K/UL (ref 0–0.2)
BASOPHILS NFR BLD: 0.5 % (ref 0–1.9)
BILIRUB SERPL-MCNC: 0.4 MG/DL (ref 0.1–1)
BILIRUB UR QL STRIP: NEGATIVE
BUN SERPL-MCNC: 39 MG/DL (ref 6–20)
BUN SERPL-MCNC: 43 MG/DL (ref 6–30)
CALCIUM SERPL-MCNC: 8.5 MG/DL (ref 8.7–10.5)
CHLORIDE SERPL-SCNC: 109 MMOL/L (ref 95–110)
CHLORIDE SERPL-SCNC: 110 MMOL/L (ref 95–110)
CLARITY UR REFRACT.AUTO: CLEAR
CO2 SERPL-SCNC: 17 MMOL/L (ref 23–29)
COLOR UR AUTO: COLORLESS
CREAT SERPL-MCNC: 3.1 MG/DL (ref 0.5–1.4)
CREAT SERPL-MCNC: 3.4 MG/DL (ref 0.5–1.4)
DIFFERENTIAL METHOD: ABNORMAL
EOSINOPHIL # BLD AUTO: 0.3 K/UL (ref 0–0.5)
EOSINOPHIL NFR BLD: 2 % (ref 0–8)
ERYTHROCYTE [DISTWIDTH] IN BLOOD BY AUTOMATED COUNT: 16.5 % (ref 11.5–14.5)
EST. GFR  (AFRICAN AMERICAN): 18.1 ML/MIN/1.73 M^2
EST. GFR  (NON AFRICAN AMERICAN): 15.7 ML/MIN/1.73 M^2
GLUCOSE SERPL-MCNC: 122 MG/DL (ref 70–110)
GLUCOSE SERPL-MCNC: 122 MG/DL (ref 70–110)
GLUCOSE UR QL STRIP: NEGATIVE
HCT VFR BLD AUTO: 34.4 % (ref 37–48.5)
HCT VFR BLD CALC: 35 %PCV (ref 36–54)
HGB BLD-MCNC: 10.8 G/DL (ref 12–16)
HGB UR QL STRIP: ABNORMAL
IMM GRANULOCYTES # BLD AUTO: 0.1 K/UL (ref 0–0.04)
IMM GRANULOCYTES NFR BLD AUTO: 0.7 % (ref 0–0.5)
KETONES UR QL STRIP: NEGATIVE
LEUKOCYTE ESTERASE UR QL STRIP: NEGATIVE
LIPASE SERPL-CCNC: 180 U/L (ref 4–60)
LYMPHOCYTES # BLD AUTO: 3.8 K/UL (ref 1–4.8)
LYMPHOCYTES NFR BLD: 27.5 % (ref 18–48)
MCH RBC QN AUTO: 25.7 PG (ref 27–31)
MCHC RBC AUTO-ENTMCNC: 31.4 G/DL (ref 32–36)
MCV RBC AUTO: 82 FL (ref 82–98)
MICROSCOPIC COMMENT: NORMAL
MONOCYTES # BLD AUTO: 0.9 K/UL (ref 0.3–1)
MONOCYTES NFR BLD: 6.6 % (ref 4–15)
NEUTROPHILS # BLD AUTO: 8.7 K/UL (ref 1.8–7.7)
NEUTROPHILS NFR BLD: 62.7 % (ref 38–73)
NITRITE UR QL STRIP: NEGATIVE
NRBC BLD-RTO: 0 /100 WBC
PH UR STRIP: 5 [PH] (ref 5–8)
PLATELET # BLD AUTO: 180 K/UL (ref 150–450)
PMV BLD AUTO: 10.5 FL (ref 9.2–12.9)
POC IONIZED CALCIUM: 1.1 MMOL/L (ref 1.06–1.42)
POC TCO2 (MEASURED): 22 MMOL/L (ref 23–29)
POTASSIUM BLD-SCNC: 4.7 MMOL/L (ref 3.5–5.1)
POTASSIUM SERPL-SCNC: 4.7 MMOL/L (ref 3.5–5.1)
PROT SERPL-MCNC: 7.9 G/DL (ref 6–8.4)
PROT UR QL STRIP: NEGATIVE
RBC # BLD AUTO: 4.21 M/UL (ref 4–5.4)
RBC #/AREA URNS AUTO: 3 /HPF (ref 0–4)
SAMPLE: ABNORMAL
SODIUM BLD-SCNC: 142 MMOL/L (ref 136–145)
SODIUM SERPL-SCNC: 138 MMOL/L (ref 136–145)
SP GR UR STRIP: 1 (ref 1–1.03)
URN SPEC COLLECT METH UR: ABNORMAL
WBC # BLD AUTO: 13.88 K/UL (ref 3.9–12.7)
WBC #/AREA URNS AUTO: 0 /HPF (ref 0–5)

## 2021-06-09 PROCEDURE — 81001 URINALYSIS AUTO W/SCOPE: CPT | Performed by: EMERGENCY MEDICINE

## 2021-06-09 PROCEDURE — 85025 COMPLETE CBC W/AUTO DIFF WBC: CPT | Performed by: EMERGENCY MEDICINE

## 2021-06-09 PROCEDURE — 83690 ASSAY OF LIPASE: CPT | Performed by: EMERGENCY MEDICINE

## 2021-06-09 PROCEDURE — 80053 COMPREHEN METABOLIC PANEL: CPT | Performed by: EMERGENCY MEDICINE

## 2021-06-09 PROCEDURE — 25000003 PHARM REV CODE 250: Performed by: EMERGENCY MEDICINE

## 2021-06-09 RX ORDER — OXYCODONE AND ACETAMINOPHEN 5; 325 MG/1; MG/1
1 TABLET ORAL
Status: COMPLETED | OUTPATIENT
Start: 2021-06-09 | End: 2021-06-09

## 2021-06-09 RX ORDER — OXYCODONE AND ACETAMINOPHEN 5; 325 MG/1; MG/1
1 TABLET ORAL EVERY 6 HOURS PRN
Qty: 18 TABLET | Refills: 0 | Status: SHIPPED | OUTPATIENT
Start: 2021-06-09 | End: 2021-07-23

## 2021-06-09 RX ADMIN — OXYCODONE HYDROCHLORIDE AND ACETAMINOPHEN 1 TABLET: 5; 325 TABLET ORAL at 12:06

## 2021-06-16 ENCOUNTER — HOSPITAL ENCOUNTER (OUTPATIENT)
Dept: RADIOLOGY | Facility: HOSPITAL | Age: 61
Discharge: HOME | End: 2021-06-16
Attending: INTERNAL MEDICINE
Payer: COMMERCIAL

## 2021-06-16 VITALS — WEIGHT: 248 LBS | HEIGHT: 71 IN | BODY MASS INDEX: 34.72 KG/M2

## 2021-06-16 DIAGNOSIS — Z12.31 ENCOUNTER FOR SCREENING MAMMOGRAM FOR BREAST CANCER: ICD-10-CM

## 2021-06-16 PROCEDURE — 77067 SCR MAMMO BI INCL CAD: CPT | Mod: TC

## 2021-06-16 PROCEDURE — 77063 MAMMO DIGITAL SCREENING BILAT WITH TOMO: ICD-10-PCS | Mod: 26,,, | Performed by: RADIOLOGY

## 2021-06-16 PROCEDURE — 77067 MAMMO DIGITAL SCREENING BILAT WITH TOMO: ICD-10-PCS | Mod: 26,,, | Performed by: RADIOLOGY

## 2021-06-16 PROCEDURE — 77067 SCR MAMMO BI INCL CAD: CPT | Mod: 26,,, | Performed by: RADIOLOGY

## 2021-06-16 PROCEDURE — 77063 BREAST TOMOSYNTHESIS BI: CPT | Mod: 26,,, | Performed by: RADIOLOGY

## 2021-07-01 ENCOUNTER — HOSPITAL ENCOUNTER (EMERGENCY)
Facility: HOSPITAL | Age: 61
Discharge: HOME | End: 2021-07-01
Attending: EMERGENCY MEDICINE
Payer: COMMERCIAL

## 2021-07-01 ENCOUNTER — OFFICE VISIT (OUTPATIENT)
Dept: URGENT CARE | Facility: CLINIC | Age: 61
End: 2021-07-01
Payer: COMMERCIAL

## 2021-07-01 VITALS
HEART RATE: 80 BPM | DIASTOLIC BLOOD PRESSURE: 82 MMHG | OXYGEN SATURATION: 96 % | TEMPERATURE: 99 F | RESPIRATION RATE: 20 BRPM | SYSTOLIC BLOOD PRESSURE: 172 MMHG

## 2021-07-01 VITALS
TEMPERATURE: 99 F | RESPIRATION RATE: 24 BRPM | HEART RATE: 88 BPM | OXYGEN SATURATION: 98 % | DIASTOLIC BLOOD PRESSURE: 80 MMHG | HEIGHT: 70 IN | BODY MASS INDEX: 35.79 KG/M2 | SYSTOLIC BLOOD PRESSURE: 144 MMHG | WEIGHT: 250 LBS

## 2021-07-01 DIAGNOSIS — R06.02 SHORTNESS OF BREATH: ICD-10-CM

## 2021-07-01 DIAGNOSIS — U07.1 COVID-19: ICD-10-CM

## 2021-07-01 DIAGNOSIS — R53.1 GENERALIZED WEAKNESS: ICD-10-CM

## 2021-07-01 DIAGNOSIS — R07.9 CHEST PAIN, UNSPECIFIED TYPE: ICD-10-CM

## 2021-07-01 DIAGNOSIS — Z11.59 SCREENING FOR VIRAL DISEASE: Primary | ICD-10-CM

## 2021-07-01 DIAGNOSIS — R07.9 CHEST PAIN: Primary | ICD-10-CM

## 2021-07-01 DIAGNOSIS — U07.1 COVID-19 VIRUS DETECTED: ICD-10-CM

## 2021-07-01 DIAGNOSIS — U07.1 COVID-19 VIRUS INFECTION: ICD-10-CM

## 2021-07-01 LAB
ANION GAP SERPL CALC-SCNC: 12 MMOL/L (ref 8–16)
ANISOCYTOSIS BLD QL SMEAR: SLIGHT
BASOPHILS # BLD AUTO: 0.04 K/UL (ref 0–0.2)
BASOPHILS NFR BLD: 0.4 % (ref 0–1.9)
BNP SERPL-MCNC: 154 PG/ML (ref 0–99)
BUN SERPL-MCNC: 29 MG/DL (ref 6–20)
CALCIUM SERPL-MCNC: 7.8 MG/DL (ref 8.7–10.5)
CHLORIDE SERPL-SCNC: 109 MMOL/L (ref 95–110)
CO2 SERPL-SCNC: 16 MMOL/L (ref 23–29)
CREAT SERPL-MCNC: 3 MG/DL (ref 0.5–1.4)
CTP QC/QA: YES
DIFFERENTIAL METHOD: ABNORMAL
EOSINOPHIL # BLD AUTO: 0 K/UL (ref 0–0.5)
EOSINOPHIL NFR BLD: 0.4 % (ref 0–8)
ERYTHROCYTE [DISTWIDTH] IN BLOOD BY AUTOMATED COUNT: 15.8 % (ref 11.5–14.5)
EST. GFR  (AFRICAN AMERICAN): 18.9 ML/MIN/1.73 M^2
EST. GFR  (NON AFRICAN AMERICAN): 16.4 ML/MIN/1.73 M^2
GLUCOSE SERPL-MCNC: 90 MG/DL (ref 70–110)
HCT VFR BLD AUTO: 31.2 % (ref 37–48.5)
HGB BLD-MCNC: 10.1 G/DL (ref 12–16)
IMM GRANULOCYTES # BLD AUTO: 0.04 K/UL (ref 0–0.04)
IMM GRANULOCYTES NFR BLD AUTO: 0.4 % (ref 0–0.5)
LYMPHOCYTES # BLD AUTO: 1.8 K/UL (ref 1–4.8)
LYMPHOCYTES NFR BLD: 18.5 % (ref 18–48)
MCH RBC QN AUTO: 26.6 PG (ref 27–31)
MCHC RBC AUTO-ENTMCNC: 32.4 G/DL (ref 32–36)
MCV RBC AUTO: 82 FL (ref 82–98)
MONOCYTES # BLD AUTO: 1.5 K/UL (ref 0.3–1)
MONOCYTES NFR BLD: 15.3 % (ref 4–15)
NEUTROPHILS # BLD AUTO: 6.3 K/UL (ref 1.8–7.7)
NEUTROPHILS NFR BLD: 65 % (ref 38–73)
NRBC BLD-RTO: 0 /100 WBC
PLATELET # BLD AUTO: 105 K/UL (ref 150–450)
PMV BLD AUTO: 10.7 FL (ref 9.2–12.9)
POTASSIUM SERPL-SCNC: 4.1 MMOL/L (ref 3.5–5.1)
RBC # BLD AUTO: 3.79 M/UL (ref 4–5.4)
SARS-COV-2 RDRP RESP QL NAA+PROBE: POSITIVE
SODIUM SERPL-SCNC: 137 MMOL/L (ref 136–145)
TROPONIN I SERPL DL<=0.01 NG/ML-MCNC: <0.006 NG/ML (ref 0–0.03)
WBC # BLD AUTO: 9.62 K/UL (ref 3.9–12.7)

## 2021-07-01 PROCEDURE — 83880 ASSAY OF NATRIURETIC PEPTIDE: CPT | Performed by: EMERGENCY MEDICINE

## 2021-07-01 PROCEDURE — 99285 EMERGENCY DEPT VISIT HI MDM: CPT | Mod: CR,,, | Performed by: EMERGENCY MEDICINE

## 2021-07-01 PROCEDURE — 71046 XR CHEST PA AND LATERAL: ICD-10-PCS | Mod: S$GLB,,, | Performed by: RADIOLOGY

## 2021-07-01 PROCEDURE — 3008F BODY MASS INDEX DOCD: CPT | Mod: CPTII,S$GLB,, | Performed by: INTERNAL MEDICINE

## 2021-07-01 PROCEDURE — 93005 ELECTROCARDIOGRAM TRACING: CPT | Mod: S$GLB,,, | Performed by: INTERNAL MEDICINE

## 2021-07-01 PROCEDURE — 99285 PR EMERGENCY DEPT VISIT,LEVEL V: ICD-10-PCS | Mod: CR,,, | Performed by: EMERGENCY MEDICINE

## 2021-07-01 PROCEDURE — 93010 EKG 12-LEAD: ICD-10-PCS | Mod: S$GLB,,, | Performed by: INTERNAL MEDICINE

## 2021-07-01 PROCEDURE — 71046 X-RAY EXAM CHEST 2 VIEWS: CPT | Mod: S$GLB,,, | Performed by: RADIOLOGY

## 2021-07-01 PROCEDURE — 3008F PR BODY MASS INDEX (BMI) DOCUMENTED: ICD-10-PCS | Mod: CPTII,S$GLB,, | Performed by: INTERNAL MEDICINE

## 2021-07-01 PROCEDURE — 93010 ELECTROCARDIOGRAM REPORT: CPT | Mod: ,,, | Performed by: INTERNAL MEDICINE

## 2021-07-01 PROCEDURE — U0002: ICD-10-PCS | Mod: QW,S$GLB,, | Performed by: INTERNAL MEDICINE

## 2021-07-01 PROCEDURE — 93010 ELECTROCARDIOGRAM REPORT: CPT | Mod: S$GLB,,, | Performed by: INTERNAL MEDICINE

## 2021-07-01 PROCEDURE — 93005 ELECTROCARDIOGRAM TRACING: CPT

## 2021-07-01 PROCEDURE — U0002 COVID-19 LAB TEST NON-CDC: HCPCS | Mod: QW,S$GLB,, | Performed by: INTERNAL MEDICINE

## 2021-07-01 PROCEDURE — 99499 UNLISTED E&M SERVICE: CPT | Mod: S$GLB,,, | Performed by: INTERNAL MEDICINE

## 2021-07-01 PROCEDURE — 99284 EMERGENCY DEPT VISIT MOD MDM: CPT

## 2021-07-01 PROCEDURE — 85025 COMPLETE CBC W/AUTO DIFF WBC: CPT | Performed by: EMERGENCY MEDICINE

## 2021-07-01 PROCEDURE — 93010 EKG 12-LEAD: ICD-10-PCS | Mod: ,,, | Performed by: INTERNAL MEDICINE

## 2021-07-01 PROCEDURE — 84484 ASSAY OF TROPONIN QUANT: CPT | Performed by: EMERGENCY MEDICINE

## 2021-07-01 PROCEDURE — 93005 EKG 12-LEAD: ICD-10-PCS | Mod: S$GLB,,, | Performed by: INTERNAL MEDICINE

## 2021-07-01 PROCEDURE — 80048 BASIC METABOLIC PNL TOTAL CA: CPT | Performed by: EMERGENCY MEDICINE

## 2021-07-01 PROCEDURE — 99499 NO LOS: ICD-10-PCS | Mod: S$GLB,,, | Performed by: INTERNAL MEDICINE

## 2021-07-02 ENCOUNTER — TELEPHONE (OUTPATIENT)
Dept: EMERGENCY MEDICINE | Facility: HOSPITAL | Age: 61
End: 2021-07-02

## 2021-07-04 ENCOUNTER — NURSE TRIAGE (OUTPATIENT)
Dept: ADMINISTRATIVE | Facility: CLINIC | Age: 61
End: 2021-07-04

## 2021-07-23 ENCOUNTER — OFFICE VISIT (OUTPATIENT)
Dept: INTERNAL MEDICINE | Facility: CLINIC | Age: 61
End: 2021-07-23
Payer: COMMERCIAL

## 2021-07-23 VITALS
HEART RATE: 77 BPM | HEIGHT: 71 IN | OXYGEN SATURATION: 94 % | WEIGHT: 245.13 LBS | SYSTOLIC BLOOD PRESSURE: 126 MMHG | BODY MASS INDEX: 34.32 KG/M2 | DIASTOLIC BLOOD PRESSURE: 74 MMHG

## 2021-07-23 DIAGNOSIS — I15.0 RENOVASCULAR HYPERTENSION: ICD-10-CM

## 2021-07-23 DIAGNOSIS — D68.1 FACTOR XI DEFICIENCY: ICD-10-CM

## 2021-07-23 DIAGNOSIS — E66.01 CLASS 2 SEVERE OBESITY DUE TO EXCESS CALORIES WITH SERIOUS COMORBIDITY AND BODY MASS INDEX (BMI) OF 35.0 TO 35.9 IN ADULT: ICD-10-CM

## 2021-07-23 DIAGNOSIS — R80.9 NEPHROTIC RANGE PROTEINURIA: ICD-10-CM

## 2021-07-23 DIAGNOSIS — N18.32 CKD STAGE G3B/A3, GFR 30-44 AND ALBUMIN CREATININE RATIO >300 MG/G: ICD-10-CM

## 2021-07-23 DIAGNOSIS — N18.31 TYPE 2 DIABETES MELLITUS WITH STAGE 3A CHRONIC KIDNEY DISEASE, WITHOUT LONG-TERM CURRENT USE OF INSULIN: ICD-10-CM

## 2021-07-23 DIAGNOSIS — E55.9 VITAMIN D DEFICIENCY: ICD-10-CM

## 2021-07-23 DIAGNOSIS — I50.32 CHRONIC DIASTOLIC HEART FAILURE: Primary | ICD-10-CM

## 2021-07-23 DIAGNOSIS — E78.2 MIXED HYPERLIPIDEMIA: ICD-10-CM

## 2021-07-23 DIAGNOSIS — E11.42 DIABETIC POLYNEUROPATHY ASSOCIATED WITH TYPE 2 DIABETES MELLITUS: ICD-10-CM

## 2021-07-23 DIAGNOSIS — U07.1 COVID-19 VIRUS INFECTION: ICD-10-CM

## 2021-07-23 DIAGNOSIS — F41.1 GENERALIZED ANXIETY DISORDER: ICD-10-CM

## 2021-07-23 DIAGNOSIS — E11.22 TYPE 2 DIABETES MELLITUS WITH STAGE 3A CHRONIC KIDNEY DISEASE, WITHOUT LONG-TERM CURRENT USE OF INSULIN: ICD-10-CM

## 2021-07-23 DIAGNOSIS — E89.0 POSTOPERATIVE HYPOTHYROIDISM: ICD-10-CM

## 2021-07-23 PROCEDURE — 3078F DIAST BP <80 MM HG: CPT | Mod: CPTII,S$GLB,, | Performed by: INTERNAL MEDICINE

## 2021-07-23 PROCEDURE — 3008F PR BODY MASS INDEX (BMI) DOCUMENTED: ICD-10-PCS | Mod: CPTII,S$GLB,, | Performed by: INTERNAL MEDICINE

## 2021-07-23 PROCEDURE — 99999 PR PBB SHADOW E&M-EST. PATIENT-LVL III: CPT | Mod: PBBFAC,,, | Performed by: INTERNAL MEDICINE

## 2021-07-23 PROCEDURE — 1125F AMNT PAIN NOTED PAIN PRSNT: CPT | Mod: CPTII,S$GLB,, | Performed by: INTERNAL MEDICINE

## 2021-07-23 PROCEDURE — 99214 PR OFFICE/OUTPT VISIT, EST, LEVL IV, 30-39 MIN: ICD-10-PCS | Mod: S$GLB,,, | Performed by: INTERNAL MEDICINE

## 2021-07-23 PROCEDURE — 99999 PR PBB SHADOW E&M-EST. PATIENT-LVL III: ICD-10-PCS | Mod: PBBFAC,,, | Performed by: INTERNAL MEDICINE

## 2021-07-23 PROCEDURE — 3074F SYST BP LT 130 MM HG: CPT | Mod: CPTII,S$GLB,, | Performed by: INTERNAL MEDICINE

## 2021-07-23 PROCEDURE — 3074F PR MOST RECENT SYSTOLIC BLOOD PRESSURE < 130 MM HG: ICD-10-PCS | Mod: CPTII,S$GLB,, | Performed by: INTERNAL MEDICINE

## 2021-07-23 PROCEDURE — 3078F PR MOST RECENT DIASTOLIC BLOOD PRESSURE < 80 MM HG: ICD-10-PCS | Mod: CPTII,S$GLB,, | Performed by: INTERNAL MEDICINE

## 2021-07-23 PROCEDURE — 3008F BODY MASS INDEX DOCD: CPT | Mod: CPTII,S$GLB,, | Performed by: INTERNAL MEDICINE

## 2021-07-23 PROCEDURE — 1125F PR PAIN SEVERITY QUANTIFIED, PAIN PRESENT: ICD-10-PCS | Mod: CPTII,S$GLB,, | Performed by: INTERNAL MEDICINE

## 2021-07-23 PROCEDURE — 99214 OFFICE O/P EST MOD 30 MIN: CPT | Mod: S$GLB,,, | Performed by: INTERNAL MEDICINE

## 2021-07-23 RX ORDER — ACETAMINOPHEN 500 MG
5000 TABLET ORAL DAILY
COMMUNITY
Start: 2021-07-23 | End: 2022-06-08 | Stop reason: SDUPTHER

## 2021-07-23 RX ORDER — LEVOTHYROXINE SODIUM 200 UG/1
200 TABLET ORAL DAILY
COMMUNITY
Start: 2021-06-27 | End: 2021-07-23 | Stop reason: SDUPTHER

## 2021-11-09 ENCOUNTER — PATIENT OUTREACH (OUTPATIENT)
Dept: ADMINISTRATIVE | Facility: OTHER | Age: 61
End: 2021-11-09
Payer: COMMERCIAL

## 2021-11-09 DIAGNOSIS — N18.31 TYPE 2 DIABETES MELLITUS WITH STAGE 3A CHRONIC KIDNEY DISEASE, WITHOUT LONG-TERM CURRENT USE OF INSULIN: Primary | ICD-10-CM

## 2021-11-09 DIAGNOSIS — E11.22 TYPE 2 DIABETES MELLITUS WITH STAGE 3A CHRONIC KIDNEY DISEASE, WITHOUT LONG-TERM CURRENT USE OF INSULIN: Primary | ICD-10-CM

## 2021-11-11 ENCOUNTER — OFFICE VISIT (OUTPATIENT)
Dept: OPHTHALMOLOGY | Facility: CLINIC | Age: 61
End: 2021-11-11
Payer: COMMERCIAL

## 2021-11-11 DIAGNOSIS — H25.13 NS (NUCLEAR SCLEROSIS), BILATERAL: ICD-10-CM

## 2021-11-11 DIAGNOSIS — E11.3213 CONTROLLED TYPE 2 DIABETES MELLITUS WITH BOTH EYES AFFECTED BY MILD NONPROLIFERATIVE RETINOPATHY AND MACULAR EDEMA, WITHOUT LONG-TERM CURRENT USE OF INSULIN: Primary | ICD-10-CM

## 2021-11-11 PROCEDURE — 99499 RISK ADDL DX/OHS AUDIT: ICD-10-PCS | Mod: S$GLB,,, | Performed by: OPHTHALMOLOGY

## 2021-11-11 PROCEDURE — 1160F RVW MEDS BY RX/DR IN RCRD: CPT | Mod: HCNC,CPTII,S$GLB, | Performed by: OPHTHALMOLOGY

## 2021-11-11 PROCEDURE — 92134 CPTRZ OPH DX IMG PST SGM RTA: CPT | Mod: HCNC,S$GLB,, | Performed by: OPHTHALMOLOGY

## 2021-11-11 PROCEDURE — 92014 PR EYE EXAM, EST PATIENT,COMPREHESV: ICD-10-PCS | Mod: HCNC,S$GLB,, | Performed by: OPHTHALMOLOGY

## 2021-11-11 PROCEDURE — 92201 PR OPHTHALMOSCOPY, EXT, W/RET DRAW/SCLERAL DEPR, I&R, UNI/BI: ICD-10-PCS | Mod: HCNC,S$GLB,, | Performed by: OPHTHALMOLOGY

## 2021-11-11 PROCEDURE — 1159F PR MEDICATION LIST DOCUMENTED IN MEDICAL RECORD: ICD-10-PCS | Mod: HCNC,CPTII,S$GLB, | Performed by: OPHTHALMOLOGY

## 2021-11-11 PROCEDURE — 99999 PR PBB SHADOW E&M-EST. PATIENT-LVL III: ICD-10-PCS | Mod: PBBFAC,HCNC,, | Performed by: OPHTHALMOLOGY

## 2021-11-11 PROCEDURE — 92134 POSTERIOR SEGMENT OCT RETINA (OCULAR COHERENCE TOMOGRAPHY)-BOTH EYES: ICD-10-PCS | Mod: HCNC,S$GLB,, | Performed by: OPHTHALMOLOGY

## 2021-11-11 PROCEDURE — 92014 COMPRE OPH EXAM EST PT 1/>: CPT | Mod: HCNC,S$GLB,, | Performed by: OPHTHALMOLOGY

## 2021-11-11 PROCEDURE — 1160F PR REVIEW ALL MEDS BY PRESCRIBER/CLIN PHARMACIST DOCUMENTED: ICD-10-PCS | Mod: HCNC,CPTII,S$GLB, | Performed by: OPHTHALMOLOGY

## 2021-11-11 PROCEDURE — 92201 OPSCPY EXTND RTA DRAW UNI/BI: CPT | Mod: HCNC,S$GLB,, | Performed by: OPHTHALMOLOGY

## 2021-11-11 PROCEDURE — 99499 UNLISTED E&M SERVICE: CPT | Mod: S$GLB,,, | Performed by: OPHTHALMOLOGY

## 2021-11-11 PROCEDURE — 99999 PR PBB SHADOW E&M-EST. PATIENT-LVL III: CPT | Mod: PBBFAC,HCNC,, | Performed by: OPHTHALMOLOGY

## 2021-11-11 PROCEDURE — 1159F MED LIST DOCD IN RCRD: CPT | Mod: HCNC,CPTII,S$GLB, | Performed by: OPHTHALMOLOGY

## 2021-11-19 ENCOUNTER — HOSPITAL ENCOUNTER (OUTPATIENT)
Dept: RADIOLOGY | Facility: OTHER | Age: 61
Discharge: HOME | End: 2021-11-19
Attending: INTERNAL MEDICINE
Payer: COMMERCIAL

## 2021-11-19 ENCOUNTER — IMMUNIZATION (OUTPATIENT)
Dept: INTERNAL MEDICINE | Facility: CLINIC | Age: 61
End: 2021-11-19
Payer: COMMERCIAL

## 2021-11-19 ENCOUNTER — OFFICE VISIT (OUTPATIENT)
Dept: INTERNAL MEDICINE | Facility: CLINIC | Age: 61
End: 2021-11-19
Payer: COMMERCIAL

## 2021-11-19 VITALS
OXYGEN SATURATION: 98 % | DIASTOLIC BLOOD PRESSURE: 96 MMHG | HEART RATE: 82 BPM | BODY MASS INDEX: 34.59 KG/M2 | HEIGHT: 70 IN | WEIGHT: 241.63 LBS | SYSTOLIC BLOOD PRESSURE: 150 MMHG

## 2021-11-19 DIAGNOSIS — I15.0 RENOVASCULAR HYPERTENSION: ICD-10-CM

## 2021-11-19 DIAGNOSIS — E78.2 MIXED HYPERLIPIDEMIA: ICD-10-CM

## 2021-11-19 DIAGNOSIS — E66.01 CLASS 2 SEVERE OBESITY DUE TO EXCESS CALORIES WITH SERIOUS COMORBIDITY AND BODY MASS INDEX (BMI) OF 35.0 TO 35.9 IN ADULT: ICD-10-CM

## 2021-11-19 DIAGNOSIS — F41.1 GENERALIZED ANXIETY DISORDER: ICD-10-CM

## 2021-11-19 DIAGNOSIS — E11.42 DIABETIC POLYNEUROPATHY ASSOCIATED WITH TYPE 2 DIABETES MELLITUS: ICD-10-CM

## 2021-11-19 DIAGNOSIS — Z23 NEED FOR VACCINATION: Primary | ICD-10-CM

## 2021-11-19 DIAGNOSIS — E11.22 TYPE 2 DIABETES MELLITUS WITH STAGE 3A CHRONIC KIDNEY DISEASE, WITHOUT LONG-TERM CURRENT USE OF INSULIN: ICD-10-CM

## 2021-11-19 DIAGNOSIS — N18.32 CKD STAGE G3B/A3, GFR 30-44 AND ALBUMIN CREATININE RATIO >300 MG/G: ICD-10-CM

## 2021-11-19 DIAGNOSIS — E89.0 POSTOPERATIVE HYPOTHYROIDISM: ICD-10-CM

## 2021-11-19 DIAGNOSIS — N18.31 TYPE 2 DIABETES MELLITUS WITH STAGE 3A CHRONIC KIDNEY DISEASE, WITHOUT LONG-TERM CURRENT USE OF INSULIN: ICD-10-CM

## 2021-11-19 DIAGNOSIS — E55.9 VITAMIN D DEFICIENCY: ICD-10-CM

## 2021-11-19 DIAGNOSIS — I50.32 CHRONIC DIASTOLIC HEART FAILURE: Primary | ICD-10-CM

## 2021-11-19 DIAGNOSIS — R10.9 RIGHT FLANK PAIN: ICD-10-CM

## 2021-11-19 DIAGNOSIS — R80.9 NEPHROTIC RANGE PROTEINURIA: ICD-10-CM

## 2021-11-19 DIAGNOSIS — U07.1 COVID-19 VIRUS INFECTION: ICD-10-CM

## 2021-11-19 DIAGNOSIS — D68.1 FACTOR XI DEFICIENCY: ICD-10-CM

## 2021-11-19 PROBLEM — E11.9 TYPE 2 DIABETES MELLITUS, WITHOUT LONG-TERM CURRENT USE OF INSULIN: Status: ACTIVE | Noted: 2021-03-03

## 2021-11-19 LAB
BACTERIA #/AREA URNS AUTO: NORMAL /HPF
BILIRUB UR QL STRIP: NEGATIVE
CLARITY UR REFRACT.AUTO: CLEAR
COLOR UR AUTO: ABNORMAL
GLUCOSE UR QL STRIP: ABNORMAL
HGB UR QL STRIP: ABNORMAL
HYALINE CASTS UR QL AUTO: 0 /LPF
KETONES UR QL STRIP: NEGATIVE
LEUKOCYTE ESTERASE UR QL STRIP: NEGATIVE
MICROSCOPIC COMMENT: NORMAL
NITRITE UR QL STRIP: NEGATIVE
PH UR STRIP: 6 [PH] (ref 5–8)
PROT UR QL STRIP: ABNORMAL
RBC #/AREA URNS AUTO: 2 /HPF (ref 0–4)
SP GR UR STRIP: 1.01 (ref 1–1.03)
SQUAMOUS #/AREA URNS AUTO: 1 /HPF
URN SPEC COLLECT METH UR: ABNORMAL
WBC #/AREA URNS AUTO: 0 /HPF (ref 0–5)

## 2021-11-19 PROCEDURE — 99214 PR OFFICE/OUTPT VISIT, EST, LEVL IV, 30-39 MIN: ICD-10-PCS | Mod: HCNC,S$GLB,, | Performed by: INTERNAL MEDICINE

## 2021-11-19 PROCEDURE — 81001 URINALYSIS AUTO W/SCOPE: CPT | Mod: HCNC | Performed by: INTERNAL MEDICINE

## 2021-11-19 PROCEDURE — 99499 RISK ADDL DX/OHS AUDIT: ICD-10-PCS | Mod: HCNC,S$GLB,, | Performed by: INTERNAL MEDICINE

## 2021-11-19 PROCEDURE — 3008F PR BODY MASS INDEX (BMI) DOCUMENTED: ICD-10-PCS | Mod: HCNC,CPTII,S$GLB, | Performed by: INTERNAL MEDICINE

## 2021-11-19 PROCEDURE — 76770 US EXAM ABDO BACK WALL COMP: CPT | Mod: 26,HCNC,, | Performed by: RADIOLOGY

## 2021-11-19 PROCEDURE — 76770 US RETROPERITONEAL COMPLETE: ICD-10-PCS | Mod: 26,HCNC,, | Performed by: RADIOLOGY

## 2021-11-19 PROCEDURE — 99999 PR PBB SHADOW E&M-EST. PATIENT-LVL III: CPT | Mod: PBBFAC,HCNC,, | Performed by: INTERNAL MEDICINE

## 2021-11-19 PROCEDURE — 3008F BODY MASS INDEX DOCD: CPT | Mod: HCNC,CPTII,S$GLB, | Performed by: INTERNAL MEDICINE

## 2021-11-19 PROCEDURE — 99214 OFFICE O/P EST MOD 30 MIN: CPT | Mod: HCNC,S$GLB,, | Performed by: INTERNAL MEDICINE

## 2021-11-19 PROCEDURE — 1159F PR MEDICATION LIST DOCUMENTED IN MEDICAL RECORD: ICD-10-PCS | Mod: HCNC,CPTII,S$GLB, | Performed by: INTERNAL MEDICINE

## 2021-11-19 PROCEDURE — 99999 PR PBB SHADOW E&M-EST. PATIENT-LVL III: ICD-10-PCS | Mod: PBBFAC,HCNC,, | Performed by: INTERNAL MEDICINE

## 2021-11-19 PROCEDURE — 3077F PR MOST RECENT SYSTOLIC BLOOD PRESSURE >= 140 MM HG: ICD-10-PCS | Mod: HCNC,CPTII,S$GLB, | Performed by: INTERNAL MEDICINE

## 2021-11-19 PROCEDURE — 0064A COVID-19, MRNA, LNP-S, PF, 100 MCG/0.25 ML DOSE VACCINE (MODERNA BOOSTER): CPT | Mod: HCNC,CV19,PBBFAC | Performed by: INTERNAL MEDICINE

## 2021-11-19 PROCEDURE — 76770 US EXAM ABDO BACK WALL COMP: CPT | Mod: TC,HCNC

## 2021-11-19 PROCEDURE — 99499 UNLISTED E&M SERVICE: CPT | Mod: HCNC,S$GLB,, | Performed by: INTERNAL MEDICINE

## 2021-11-19 PROCEDURE — 3080F DIAST BP >= 90 MM HG: CPT | Mod: HCNC,CPTII,S$GLB, | Performed by: INTERNAL MEDICINE

## 2021-11-19 PROCEDURE — 3080F PR MOST RECENT DIASTOLIC BLOOD PRESSURE >= 90 MM HG: ICD-10-PCS | Mod: HCNC,CPTII,S$GLB, | Performed by: INTERNAL MEDICINE

## 2021-11-19 PROCEDURE — 3077F SYST BP >= 140 MM HG: CPT | Mod: HCNC,CPTII,S$GLB, | Performed by: INTERNAL MEDICINE

## 2021-11-19 PROCEDURE — 1159F MED LIST DOCD IN RCRD: CPT | Mod: HCNC,CPTII,S$GLB, | Performed by: INTERNAL MEDICINE

## 2021-11-19 RX ORDER — ESCITALOPRAM OXALATE 10 MG/1
10 TABLET ORAL DAILY
Qty: 90 TABLET | Refills: 3 | Status: SHIPPED | OUTPATIENT
Start: 2021-11-19 | End: 2022-02-21 | Stop reason: SDUPTHER

## 2021-11-19 RX ORDER — TRAMADOL HYDROCHLORIDE 50 MG/1
50 TABLET ORAL EVERY 12 HOURS PRN
Qty: 14 TABLET | Refills: 0 | Status: SHIPPED | OUTPATIENT
Start: 2021-11-19 | End: 2021-11-26

## 2021-11-19 RX ORDER — LEVOTHYROXINE SODIUM 200 UG/1
200 TABLET ORAL
Qty: 90 TABLET | Refills: 3 | Status: SHIPPED | OUTPATIENT
Start: 2021-11-19 | End: 2022-01-19 | Stop reason: SDUPTHER

## 2021-11-19 RX ORDER — TIZANIDINE 4 MG/1
4 TABLET ORAL EVERY 6 HOURS PRN
Qty: 90 TABLET | Refills: 1 | Status: SHIPPED | OUTPATIENT
Start: 2021-11-19 | End: 2021-12-19

## 2021-11-19 RX ORDER — FUROSEMIDE 20 MG/1
20 TABLET ORAL DAILY
Qty: 90 TABLET | Refills: 3 | Status: ON HOLD | OUTPATIENT
Start: 2021-11-19 | End: 2022-09-13 | Stop reason: HOSPADM

## 2021-11-19 RX ORDER — TRAZODONE HYDROCHLORIDE 50 MG/1
50 TABLET ORAL NIGHTLY PRN
Qty: 90 TABLET | Refills: 3 | Status: ON HOLD | OUTPATIENT
Start: 2021-11-19 | End: 2022-09-12 | Stop reason: CLARIF

## 2021-11-19 RX ORDER — AMLODIPINE BESYLATE 10 MG/1
10 TABLET ORAL DAILY
Qty: 90 TABLET | Refills: 3 | Status: ON HOLD | OUTPATIENT
Start: 2021-11-19 | End: 2022-09-12 | Stop reason: CLARIF

## 2021-11-19 RX ORDER — DEXAMETHASONE 4 MG/1
4 TABLET ORAL EVERY 12 HOURS
Qty: 10 TABLET | Refills: 0 | Status: SHIPPED | OUTPATIENT
Start: 2021-11-19 | End: 2021-11-24

## 2021-11-19 RX ORDER — ATORVASTATIN CALCIUM 10 MG/1
10 TABLET, FILM COATED ORAL DAILY
Qty: 90 TABLET | Refills: 3 | Status: SHIPPED | OUTPATIENT
Start: 2021-11-19 | End: 2022-09-07

## 2021-11-19 RX ORDER — CARVEDILOL 25 MG/1
25 TABLET ORAL 2 TIMES DAILY WITH MEALS
Qty: 180 TABLET | Refills: 3 | Status: ON HOLD | OUTPATIENT
Start: 2021-11-19 | End: 2022-09-12 | Stop reason: CLARIF

## 2021-12-21 ENCOUNTER — LAB VISIT (OUTPATIENT)
Dept: PRIMARY CARE CLINIC | Facility: CLINIC | Age: 61
End: 2021-12-21
Payer: COMMERCIAL

## 2021-12-21 DIAGNOSIS — E55.9 VITAMIN D DEFICIENCY: ICD-10-CM

## 2021-12-21 DIAGNOSIS — E11.22 TYPE 2 DIABETES MELLITUS WITH STAGE 3A CHRONIC KIDNEY DISEASE, WITHOUT LONG-TERM CURRENT USE OF INSULIN: ICD-10-CM

## 2021-12-21 DIAGNOSIS — E78.2 MIXED HYPERLIPIDEMIA: ICD-10-CM

## 2021-12-21 DIAGNOSIS — I50.32 CHRONIC DIASTOLIC HEART FAILURE: ICD-10-CM

## 2021-12-21 DIAGNOSIS — N18.31 TYPE 2 DIABETES MELLITUS WITH STAGE 3A CHRONIC KIDNEY DISEASE, WITHOUT LONG-TERM CURRENT USE OF INSULIN: ICD-10-CM

## 2021-12-21 LAB
25(OH)D3+25(OH)D2 SERPL-MCNC: 29 NG/ML (ref 30–96)
ALBUMIN SERPL BCP-MCNC: 3.3 G/DL (ref 3.5–5.2)
ALP SERPL-CCNC: 110 U/L (ref 55–135)
ALT SERPL W/O P-5'-P-CCNC: 11 U/L (ref 10–44)
ANION GAP SERPL CALC-SCNC: 7 MMOL/L (ref 8–16)
AST SERPL-CCNC: 15 U/L (ref 10–40)
BASOPHILS # BLD AUTO: 0.06 K/UL (ref 0–0.2)
BASOPHILS NFR BLD: 0.5 % (ref 0–1.9)
BILIRUB SERPL-MCNC: 0.4 MG/DL (ref 0.1–1)
BUN SERPL-MCNC: 30 MG/DL (ref 6–20)
CALCIUM SERPL-MCNC: 8.3 MG/DL (ref 8.7–10.5)
CHLORIDE SERPL-SCNC: 110 MMOL/L (ref 95–110)
CHOLEST SERPL-MCNC: 196 MG/DL (ref 120–199)
CHOLEST/HDLC SERPL: 3.2 {RATIO} (ref 2–5)
CO2 SERPL-SCNC: 26 MMOL/L (ref 23–29)
CREAT SERPL-MCNC: 3 MG/DL (ref 0.5–1.4)
DIFFERENTIAL METHOD: ABNORMAL
EOSINOPHIL # BLD AUTO: 0.3 K/UL (ref 0–0.5)
EOSINOPHIL NFR BLD: 2.3 % (ref 0–8)
ERYTHROCYTE [DISTWIDTH] IN BLOOD BY AUTOMATED COUNT: 13.5 % (ref 11.5–14.5)
EST. GFR  (AFRICAN AMERICAN): 18.9 ML/MIN/1.73 M^2
EST. GFR  (NON AFRICAN AMERICAN): 16.4 ML/MIN/1.73 M^2
ESTIMATED AVG GLUCOSE: 131 MG/DL (ref 68–131)
GLUCOSE SERPL-MCNC: 140 MG/DL (ref 70–110)
HBA1C MFR BLD: 6.2 % (ref 4–5.6)
HCT VFR BLD AUTO: 32.5 % (ref 37–48.5)
HDLC SERPL-MCNC: 62 MG/DL (ref 40–75)
HDLC SERPL: 31.6 % (ref 20–50)
HGB BLD-MCNC: 10.2 G/DL (ref 12–16)
IMM GRANULOCYTES # BLD AUTO: 0.1 K/UL (ref 0–0.04)
IMM GRANULOCYTES NFR BLD AUTO: 0.9 % (ref 0–0.5)
LDLC SERPL CALC-MCNC: 104.6 MG/DL (ref 63–159)
LYMPHOCYTES # BLD AUTO: 2.5 K/UL (ref 1–4.8)
LYMPHOCYTES NFR BLD: 21.9 % (ref 18–48)
MCH RBC QN AUTO: 26.8 PG (ref 27–31)
MCHC RBC AUTO-ENTMCNC: 31.4 G/DL (ref 32–36)
MCV RBC AUTO: 85 FL (ref 82–98)
MONOCYTES # BLD AUTO: 0.7 K/UL (ref 0.3–1)
MONOCYTES NFR BLD: 6.4 % (ref 4–15)
NEUTROPHILS # BLD AUTO: 7.7 K/UL (ref 1.8–7.7)
NEUTROPHILS NFR BLD: 68 % (ref 38–73)
NONHDLC SERPL-MCNC: 134 MG/DL
NRBC BLD-RTO: 0 /100 WBC
PLATELET # BLD AUTO: 217 K/UL (ref 150–450)
PMV BLD AUTO: 11.2 FL (ref 9.2–12.9)
POTASSIUM SERPL-SCNC: 4.3 MMOL/L (ref 3.5–5.1)
PROT SERPL-MCNC: 6.7 G/DL (ref 6–8.4)
RBC # BLD AUTO: 3.81 M/UL (ref 4–5.4)
SODIUM SERPL-SCNC: 143 MMOL/L (ref 136–145)
TRIGL SERPL-MCNC: 147 MG/DL (ref 30–150)
TSH SERPL DL<=0.005 MIU/L-ACNC: 1.58 UIU/ML (ref 0.4–4)
WBC # BLD AUTO: 11.26 K/UL (ref 3.9–12.7)

## 2021-12-21 PROCEDURE — 36415 PR COLLECTION VENOUS BLOOD,VENIPUNCTURE: ICD-10-PCS | Mod: HCNC,S$GLB,, | Performed by: INTERNAL MEDICINE

## 2021-12-21 PROCEDURE — 84443 ASSAY THYROID STIM HORMONE: CPT | Mod: HCNC | Performed by: INTERNAL MEDICINE

## 2021-12-21 PROCEDURE — 82306 VITAMIN D 25 HYDROXY: CPT | Mod: HCNC | Performed by: INTERNAL MEDICINE

## 2021-12-21 PROCEDURE — 83036 HEMOGLOBIN GLYCOSYLATED A1C: CPT | Mod: HCNC | Performed by: INTERNAL MEDICINE

## 2021-12-21 PROCEDURE — 80061 LIPID PANEL: CPT | Mod: HCNC | Performed by: INTERNAL MEDICINE

## 2021-12-21 PROCEDURE — 85025 COMPLETE CBC W/AUTO DIFF WBC: CPT | Mod: HCNC | Performed by: INTERNAL MEDICINE

## 2021-12-21 PROCEDURE — 36415 COLL VENOUS BLD VENIPUNCTURE: CPT | Mod: HCNC | Performed by: INTERNAL MEDICINE

## 2021-12-21 PROCEDURE — 36415 COLL VENOUS BLD VENIPUNCTURE: CPT | Mod: HCNC,S$GLB,, | Performed by: INTERNAL MEDICINE

## 2021-12-21 PROCEDURE — 80053 COMPREHEN METABOLIC PANEL: CPT | Mod: HCNC | Performed by: INTERNAL MEDICINE

## 2021-12-21 NOTE — PROGRESS NOTES
Study Title / IRB #: [facility] / [id number]     Principle Investigator: Dr. Juan Wood        Date of visit: 2/21/2019     Prior to the Informed Consent (IC) being signed, or any study protocol required data collection, testing, procedure, or intervention being performed, the following was done and/or discussed:     · Patient was given a copy of the IC for review   · Purpose of the study and qualifications to participate   · Confidentiality and HIPAA Authorization for Release of Medical Records for the research trial/ subject's rights/research related injury  · Risk, Benefits, Alternatives, Compensation and Costs  · Participation in the research trial is voluntary and patient may withdraw at anytime  · Contact information for study related questions     Patient verbalized understanding of the above: Yes  Contact information for CRC and PI given to patient: Yes     Pt signed the informed consent form for the [facility] research study with an IRB approved consent, and was given a signed copy.  Each page of the consent form was reviewed with , Alexa Huerta, and all questions answered satisfactorily.  
Eyeglasses

## 2022-01-14 ENCOUNTER — OFFICE VISIT (OUTPATIENT)
Dept: INTERNAL MEDICINE | Facility: CLINIC | Age: 62
End: 2022-01-14
Payer: COMMERCIAL

## 2022-01-14 VITALS
SYSTOLIC BLOOD PRESSURE: 120 MMHG | WEIGHT: 253.06 LBS | BODY MASS INDEX: 36.23 KG/M2 | OXYGEN SATURATION: 99 % | HEART RATE: 76 BPM | HEIGHT: 70 IN | DIASTOLIC BLOOD PRESSURE: 68 MMHG

## 2022-01-14 DIAGNOSIS — N18.32 CKD STAGE G3B/A3, GFR 30-44 AND ALBUMIN CREATININE RATIO >300 MG/G: ICD-10-CM

## 2022-01-14 DIAGNOSIS — E11.42 DIABETIC POLYNEUROPATHY ASSOCIATED WITH TYPE 2 DIABETES MELLITUS: ICD-10-CM

## 2022-01-14 DIAGNOSIS — E11.22 TYPE 2 DIABETES MELLITUS WITH STAGE 3A CHRONIC KIDNEY DISEASE, WITHOUT LONG-TERM CURRENT USE OF INSULIN: ICD-10-CM

## 2022-01-14 DIAGNOSIS — E55.9 VITAMIN D DEFICIENCY: ICD-10-CM

## 2022-01-14 DIAGNOSIS — E89.0 POSTOPERATIVE HYPOTHYROIDISM: ICD-10-CM

## 2022-01-14 DIAGNOSIS — D64.9 NORMOCYTIC ANEMIA: ICD-10-CM

## 2022-01-14 DIAGNOSIS — D68.1 FACTOR XI DEFICIENCY: ICD-10-CM

## 2022-01-14 DIAGNOSIS — F41.1 GENERALIZED ANXIETY DISORDER: ICD-10-CM

## 2022-01-14 DIAGNOSIS — E78.2 MIXED HYPERLIPIDEMIA: ICD-10-CM

## 2022-01-14 DIAGNOSIS — E66.01 CLASS 2 SEVERE OBESITY DUE TO EXCESS CALORIES WITH SERIOUS COMORBIDITY AND BODY MASS INDEX (BMI) OF 35.0 TO 35.9 IN ADULT: ICD-10-CM

## 2022-01-14 DIAGNOSIS — I15.0 RENOVASCULAR HYPERTENSION: ICD-10-CM

## 2022-01-14 DIAGNOSIS — N18.31 TYPE 2 DIABETES MELLITUS WITH STAGE 3A CHRONIC KIDNEY DISEASE, WITHOUT LONG-TERM CURRENT USE OF INSULIN: ICD-10-CM

## 2022-01-14 DIAGNOSIS — R80.9 NEPHROTIC RANGE PROTEINURIA: ICD-10-CM

## 2022-01-14 DIAGNOSIS — I50.32 CHRONIC DIASTOLIC HEART FAILURE: Primary | ICD-10-CM

## 2022-01-14 PROBLEM — U07.1 COVID-19 VIRUS INFECTION: Status: RESOLVED | Noted: 2021-07-01 | Resolved: 2022-01-14

## 2022-01-14 PROCEDURE — 3074F SYST BP LT 130 MM HG: CPT | Mod: HCNC,CPTII,S$GLB, | Performed by: INTERNAL MEDICINE

## 2022-01-14 PROCEDURE — 99214 PR OFFICE/OUTPT VISIT, EST, LEVL IV, 30-39 MIN: ICD-10-PCS | Mod: HCNC,S$GLB,, | Performed by: INTERNAL MEDICINE

## 2022-01-14 PROCEDURE — 3008F BODY MASS INDEX DOCD: CPT | Mod: HCNC,CPTII,S$GLB, | Performed by: INTERNAL MEDICINE

## 2022-01-14 PROCEDURE — 99999 PR PBB SHADOW E&M-EST. PATIENT-LVL IV: ICD-10-PCS | Mod: PBBFAC,HCNC,, | Performed by: INTERNAL MEDICINE

## 2022-01-14 PROCEDURE — 99999 PR PBB SHADOW E&M-EST. PATIENT-LVL IV: CPT | Mod: PBBFAC,HCNC,, | Performed by: INTERNAL MEDICINE

## 2022-01-14 PROCEDURE — 3078F PR MOST RECENT DIASTOLIC BLOOD PRESSURE < 80 MM HG: ICD-10-PCS | Mod: HCNC,CPTII,S$GLB, | Performed by: INTERNAL MEDICINE

## 2022-01-14 PROCEDURE — 3078F DIAST BP <80 MM HG: CPT | Mod: HCNC,CPTII,S$GLB, | Performed by: INTERNAL MEDICINE

## 2022-01-14 PROCEDURE — 99499 RISK ADDL DX/OHS AUDIT: ICD-10-PCS | Mod: HCNC,S$GLB,, | Performed by: INTERNAL MEDICINE

## 2022-01-14 PROCEDURE — 99214 OFFICE O/P EST MOD 30 MIN: CPT | Mod: HCNC,S$GLB,, | Performed by: INTERNAL MEDICINE

## 2022-01-14 PROCEDURE — 99499 UNLISTED E&M SERVICE: CPT | Mod: HCNC,S$GLB,, | Performed by: INTERNAL MEDICINE

## 2022-01-14 PROCEDURE — 1159F MED LIST DOCD IN RCRD: CPT | Mod: HCNC,CPTII,S$GLB, | Performed by: INTERNAL MEDICINE

## 2022-01-14 PROCEDURE — 1159F PR MEDICATION LIST DOCUMENTED IN MEDICAL RECORD: ICD-10-PCS | Mod: HCNC,CPTII,S$GLB, | Performed by: INTERNAL MEDICINE

## 2022-01-14 PROCEDURE — 3008F PR BODY MASS INDEX (BMI) DOCUMENTED: ICD-10-PCS | Mod: HCNC,CPTII,S$GLB, | Performed by: INTERNAL MEDICINE

## 2022-01-14 PROCEDURE — 3074F PR MOST RECENT SYSTOLIC BLOOD PRESSURE < 130 MM HG: ICD-10-PCS | Mod: HCNC,CPTII,S$GLB, | Performed by: INTERNAL MEDICINE

## 2022-01-19 ENCOUNTER — TELEPHONE (OUTPATIENT)
Dept: INTERNAL MEDICINE | Facility: CLINIC | Age: 62
End: 2022-01-19
Payer: COMMERCIAL

## 2022-01-19 DIAGNOSIS — I50.32 CHRONIC DIASTOLIC HEART FAILURE: Primary | ICD-10-CM

## 2022-01-19 DIAGNOSIS — M62.838 MUSCLE SPASM: ICD-10-CM

## 2022-01-19 DIAGNOSIS — E89.0 POSTOPERATIVE HYPOTHYROIDISM: ICD-10-CM

## 2022-01-19 RX ORDER — LEVOTHYROXINE SODIUM 200 UG/1
200 TABLET ORAL
Qty: 90 TABLET | Refills: 3 | Status: SHIPPED | OUTPATIENT
Start: 2022-01-19 | End: 2022-02-21 | Stop reason: SDUPTHER

## 2022-01-19 RX ORDER — TIZANIDINE 4 MG/1
4 TABLET ORAL EVERY 6 HOURS PRN
Qty: 90 TABLET | Refills: 1 | Status: SHIPPED | OUTPATIENT
Start: 2022-01-19 | End: 2022-09-07

## 2022-02-22 ENCOUNTER — PATIENT MESSAGE (OUTPATIENT)
Dept: RESEARCH | Facility: HOSPITAL | Age: 62
End: 2022-02-22
Payer: COMMERCIAL

## 2022-02-24 ENCOUNTER — HOSPITAL ENCOUNTER (EMERGENCY)
Facility: HOSPITAL | Age: 62
Discharge: HOME | End: 2022-02-24
Attending: EMERGENCY MEDICINE
Payer: COMMERCIAL

## 2022-02-24 VITALS
BODY MASS INDEX: 37.22 KG/M2 | SYSTOLIC BLOOD PRESSURE: 189 MMHG | HEIGHT: 70 IN | OXYGEN SATURATION: 97 % | DIASTOLIC BLOOD PRESSURE: 82 MMHG | TEMPERATURE: 98 F | WEIGHT: 260 LBS | RESPIRATION RATE: 19 BRPM | HEART RATE: 82 BPM

## 2022-02-24 DIAGNOSIS — R07.9 CHEST PAIN: ICD-10-CM

## 2022-02-24 DIAGNOSIS — K29.70 GASTRITIS, PRESENCE OF BLEEDING UNSPECIFIED, UNSPECIFIED CHRONICITY, UNSPECIFIED GASTRITIS TYPE: Primary | ICD-10-CM

## 2022-02-24 LAB
ALBUMIN SERPL BCP-MCNC: 3.3 G/DL (ref 3.5–5.2)
ALP SERPL-CCNC: 134 U/L (ref 55–135)
ALT SERPL W/O P-5'-P-CCNC: 10 U/L (ref 10–44)
ANION GAP SERPL CALC-SCNC: 15 MMOL/L (ref 8–16)
AST SERPL-CCNC: 13 U/L (ref 10–40)
BASOPHILS # BLD AUTO: 0.05 K/UL (ref 0–0.2)
BASOPHILS NFR BLD: 0.4 % (ref 0–1.9)
BILIRUB SERPL-MCNC: 0.5 MG/DL (ref 0.1–1)
BNP SERPL-MCNC: 142 PG/ML (ref 0–99)
BUN SERPL-MCNC: 34 MG/DL (ref 6–20)
CALCIUM SERPL-MCNC: 8.4 MG/DL (ref 8.7–10.5)
CHLORIDE SERPL-SCNC: 108 MMOL/L (ref 95–110)
CO2 SERPL-SCNC: 17 MMOL/L (ref 23–29)
CREAT SERPL-MCNC: 3 MG/DL (ref 0.5–1.4)
DIFFERENTIAL METHOD: ABNORMAL
EOSINOPHIL # BLD AUTO: 0.2 K/UL (ref 0–0.5)
EOSINOPHIL NFR BLD: 1.5 % (ref 0–8)
ERYTHROCYTE [DISTWIDTH] IN BLOOD BY AUTOMATED COUNT: 13.9 % (ref 11.5–14.5)
EST. GFR  (AFRICAN AMERICAN): 18.7 ML/MIN/1.73 M^2
EST. GFR  (NON AFRICAN AMERICAN): 16.3 ML/MIN/1.73 M^2
GLUCOSE SERPL-MCNC: 232 MG/DL (ref 70–110)
HCT VFR BLD AUTO: 35.7 % (ref 37–48.5)
HGB BLD-MCNC: 11.5 G/DL (ref 12–16)
IMM GRANULOCYTES # BLD AUTO: 0.11 K/UL (ref 0–0.04)
IMM GRANULOCYTES NFR BLD AUTO: 0.9 % (ref 0–0.5)
LYMPHOCYTES # BLD AUTO: 2.3 K/UL (ref 1–4.8)
LYMPHOCYTES NFR BLD: 18.5 % (ref 18–48)
MCH RBC QN AUTO: 27.3 PG (ref 27–31)
MCHC RBC AUTO-ENTMCNC: 32.2 G/DL (ref 32–36)
MCV RBC AUTO: 85 FL (ref 82–98)
MONOCYTES # BLD AUTO: 0.6 K/UL (ref 0.3–1)
MONOCYTES NFR BLD: 5.2 % (ref 4–15)
NEUTROPHILS # BLD AUTO: 9 K/UL (ref 1.8–7.7)
NEUTROPHILS NFR BLD: 73.5 % (ref 38–73)
NRBC BLD-RTO: 0 /100 WBC
PLATELET # BLD AUTO: 221 K/UL (ref 150–450)
PMV BLD AUTO: 11.2 FL (ref 9.2–12.9)
POTASSIUM SERPL-SCNC: 3.6 MMOL/L (ref 3.5–5.1)
PROT SERPL-MCNC: 7.8 G/DL (ref 6–8.4)
RBC # BLD AUTO: 4.21 M/UL (ref 4–5.4)
SODIUM SERPL-SCNC: 140 MMOL/L (ref 136–145)
TROPONIN I SERPL DL<=0.01 NG/ML-MCNC: 0.01 NG/ML (ref 0–0.03)
WBC # BLD AUTO: 12.21 K/UL (ref 3.9–12.7)

## 2022-02-24 PROCEDURE — 83880 ASSAY OF NATRIURETIC PEPTIDE: CPT | Mod: HCNC | Performed by: EMERGENCY MEDICINE

## 2022-02-24 PROCEDURE — 93010 ELECTROCARDIOGRAM REPORT: CPT | Mod: HCNC,,, | Performed by: INTERNAL MEDICINE

## 2022-02-24 PROCEDURE — 99284 PR EMERGENCY DEPT VISIT,LEVEL IV: ICD-10-PCS | Mod: HCNC,,, | Performed by: EMERGENCY MEDICINE

## 2022-02-24 PROCEDURE — 85025 COMPLETE CBC W/AUTO DIFF WBC: CPT | Mod: HCNC | Performed by: EMERGENCY MEDICINE

## 2022-02-24 PROCEDURE — 84484 ASSAY OF TROPONIN QUANT: CPT | Mod: HCNC | Performed by: EMERGENCY MEDICINE

## 2022-02-24 PROCEDURE — 99285 EMERGENCY DEPT VISIT HI MDM: CPT | Mod: 25,HCNC

## 2022-02-24 PROCEDURE — 93010 EKG 12-LEAD: ICD-10-PCS | Mod: HCNC,,, | Performed by: INTERNAL MEDICINE

## 2022-02-24 PROCEDURE — 80053 COMPREHEN METABOLIC PANEL: CPT | Mod: HCNC | Performed by: EMERGENCY MEDICINE

## 2022-02-24 PROCEDURE — 93005 ELECTROCARDIOGRAM TRACING: CPT | Mod: HCNC

## 2022-02-24 PROCEDURE — 99284 EMERGENCY DEPT VISIT MOD MDM: CPT | Mod: HCNC,,, | Performed by: EMERGENCY MEDICINE

## 2022-02-24 NOTE — ED PROVIDER NOTES
Encounter Date: 2/24/2022    SCRIBE #1 NOTE: INick am scribing for, and in the presence of,  Vance Araya DO. I have scribed the following portions of the note -       History     Chief Complaint   Patient presents with    Chest Pain     For past week with nausea; hx of CHF     Kathie Quezada is a 62 y.o. female with a PMHx of CHF, HTN, IDDM type II who presents with a chief complaint of chest pain onset one week ago. She reports the chest pain extends to her back. The pain last seconds-minutes. She additionally has trouble swallowing. Her most recent episode was this AM at rest. Associated symptoms include nausea. She denies tobacco or EtOh use.Patient reports no other identifying, alleviating, or exacerbating factors and denies vomiting or any other associated symptoms at this time.            The history is provided by medical records and the patient. No  was used.     Review of patient's allergies indicates:   Allergen Reactions    Mushroom Hives    Bactrim [sulfamethoxazole-trimethoprim] Hives    Codeine Hives    Gabapentin      Pruritis     Iodine     Peanut butter flavor     Penicillins Hives    Shellfish containing products Itching    Sulfa (sulfonamide antibiotics) Hives     Past Medical History:   Diagnosis Date    Acute respiratory failure with hypoxia and hypercapnia 5/26/2019    ATN (acute tubular necrosis) 2/8/2019    Chronic diastolic heart failure 2/7/2019 2-8-19 Mild left atrial enlargement. Mild left ventricular enlargement. Normal left ventricular systolic function. The estimated ejection fraction is 55% Indeterminate left ventricular diastolic function. Normal right ventricular systolic function. Mild mitral regurgitation. Mild to moderate tricuspid regurgitation. The estimated PA systolic pressure is 32 mm Hg Normal central venous pressure (3 m    CKD stage G3a/A3, GFR 45-59 and albumin creatinine ratio >300 mg/g 5/16/2018    CKD  stage G3b/A3, GFR 30-44 and albumin creatinine ratio >300 mg/g 5/16/2018    Closed compression fracture of L2 lumbar vertebra 5/24/2019    Closed compression fracture of L2 lumbar vertebra 5/24/2019     IMO Regulatory Load October 2019    Controlled type 2 diabetes with retinopathy 4/9/2018    COVID-19 virus infection 7-1-2021 7/1/2021    CVA (cerebral vascular accident) 2017    Diabetic polyneuropathy associated with type 2 diabetes mellitus 12/4/2020    Essential hypertension 4/9/2018    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  7-31-19 Factor XI Activity 55 - 145 % 96       Gastroesophageal reflux disease without esophagitis 5/24/2019    Generalized anxiety disorder 11/8/2019    GERD (gastroesophageal reflux disease)     GIB (gastrointestinal bleeding) 10/18/2018    History of hepatitis C     s/p succesful Rx w/ [facility]  - SVR12 (cure) 2016    Hypertensive retinopathy, bilateral 12/10/2019    Mild nonproliferative diabetic retinopathy 05/02/2018    Mixed hyperlipidemia 4/9/2018    Normocytic anemia 1/14/2022    NS (nuclear sclerosis), bilateral 12/10/2019    PNA (pneumonia) 5/28/2019    Postoperative hypothyroidism     Renovascular hypertension 4/9/2018    S/P kyphoplasty 8/14/2019 8/14/19: L2 kyphoplasty with Dr. Kwon     Slow transit constipation 5/27/2019    Type 2 diabetes mellitus with circulatory disorder, with long-term current use of insulin 4/9/2018    Type 2 diabetes mellitus with stage 3 chronic kidney disease, without long-term current use of insulin 4/9/2018    Type 2 diabetes mellitus with stage 3a chronic kidney disease, without long-term current use of insulin 3/3/2021     Past Surgical History:   Procedure Laterality Date    BACK SURGERY  2017    CHOLECYSTECTOMY  2015    [facility]    COLONOSCOPY  11/13/2015    Dr Chino Herbert - brian prep. internal hemorrhoids. diverticulosis of sigmois. NO polyps. repeat due 2025    FIXATION KYPHOPLASTY Bilateral  8/14/2019    Procedure: L2 kyphoplasty Fluoro Globus;  Surgeon: Jeremie Kwon DO;  Location: [facility] OR;  Service: Neurosurgery;  Laterality: Bilateral;  EMMYUS MACRINA WINSLOW [phone] TEXTED HER @ 10:54AM ON 7-30-19  PRE-OP-BY RN  8-7-2019    HYSTERECTOMY      OOPHORECTOMY      one    THYROIDECTOMY  2016    at [facility] nodule    TONSILLECTOMY       Family History   Problem Relation Age of Onset    Diabetes type II Mother      Social History     Tobacco Use    Smoking status: Never Smoker    Smokeless tobacco: Never Used   Substance Use Topics    Alcohol use: Yes     Alcohol/week: 4.0 standard drinks     Types: 4 Cans of beer per week     Comment: weekly    Drug use: No     Review of Systems   Constitutional: Negative for chills and fever.   HENT: Positive for trouble swallowing.    Cardiovascular: Positive for chest pain.   Gastrointestinal: Positive for abdominal pain and nausea. Negative for vomiting.   Musculoskeletal: Positive for back pain.       Physical Exam     Initial Vitals   BP Pulse Resp Temp SpO2   02/24/22 0931 02/24/22 0931 02/24/22 0931 02/24/22 0932 02/24/22 0931   (!) 192/91 87 (!) 22 98.1 °F (36.7 °C) 98 %      MAP       --                Physical Exam    Nursing note and vitals reviewed.  Constitutional: She appears well-developed and well-nourished.   HENT:   Head: Normocephalic and atraumatic.   Right Ear: External ear normal.   Left Ear: External ear normal.   Nose: Nose normal.   Mouth/Throat: Oropharynx is clear and moist.   Eyes: Conjunctivae and EOM are normal. Pupils are equal, round, and reactive to light. No scleral icterus.   Neck: Neck supple. No JVD present.   Normal range of motion.  Cardiovascular: Normal rate, regular rhythm, normal heart sounds and intact distal pulses. Exam reveals no gallop and no friction rub.    No murmur heard.  Pulmonary/Chest: Breath sounds normal. No stridor. No respiratory distress. She has no wheezes. She exhibits no tenderness.    Abdominal: Abdomen is soft. Bowel sounds are normal. She exhibits no distension and no mass. There is no abdominal tenderness. There is no rebound and no guarding.   Musculoskeletal:         General: No tenderness or edema. Normal range of motion.      Cervical back: Normal range of motion and neck supple.      Comments: Back is nontender to palpation.      Neurological: She is alert and oriented to person, place, and time. She has normal strength. No cranial nerve deficit.   Skin: Skin is warm and dry. Capillary refill takes less than 2 seconds. No rash noted. No pallor.   Psychiatric: She has a normal mood and affect. Thought content normal.         ED Course   Procedures  Labs Reviewed   B-TYPE NATRIURETIC PEPTIDE - Abnormal; Notable for the following components:       Result Value     (*)     All other components within normal limits   CBC W/ AUTO DIFFERENTIAL - Abnormal; Notable for the following components:    Hemoglobin 11.5 (*)     Hematocrit 35.7 (*)     Immature Granulocytes 0.9 (*)     Gran # (ANC) 9.0 (*)     Immature Grans (Abs) 0.11 (*)     Gran % 73.5 (*)     All other components within normal limits   COMPREHENSIVE METABOLIC PANEL - Abnormal; Notable for the following components:    CO2 17 (*)     Glucose 232 (*)     BUN 34 (*)     Creatinine 3.0 (*)     Calcium 8.4 (*)     Albumin 3.3 (*)     eGFR if  18.7 (*)     eGFR if non  16.3 (*)     All other components within normal limits   TROPONIN I     EKG Readings: (Independently Interpreted)   Initial Reading: No STEMI. Rhythm: Normal Sinus Rhythm. Heart Rate: 83. Axis: Normal.   No ischemia   No QT prolongation     ECG Results          EKG 12-lead (Final result)  Result time 02/24/22 11:22:56    Final result by Interface, Lab In [facility] (02/24/22 11:22:56)                 Narrative:    Test Reason : R07.9,    Vent. Rate : 083 BPM     Atrial Rate : 083 BPM     P-R Int : 184 ms          QRS Dur : 092 ms      QT  Int : 396 ms       P-R-T Axes : 001 017 021 degrees     QTc Int : 465 ms    Normal sinus rhythm  Normal ECG  When compared with ECG of 01-JUL-2021 21:21,  No significant change was found  Confirmed by MARQUITA SCHAFRE MD ([id number]) on 2/24/2022 11:22:46 AM    Referred By: GAMA   SELF           Confirmed By:MARQUITA SCHAFER MD                            Imaging Results          X-Ray Chest 1 View (Final result)  Result time 02/24/22 10:42:00    Final result by Moisés Nicole III, MD (02/24/22 10:42:00)                 Impression:      Cardiomegaly.      Electronically signed by: Moisés Nicole MD  Date:    02/24/2022  Time:    10:42             Narrative:    EXAMINATION:  XR CHEST 1 VIEW    CLINICAL HISTORY:  Chest pain, unspecified    FINDINGS:  Chest one view: There is cardiomegaly.  Lungs are clear.  Bones show nothing unusual.                              X-Rays:   Independently Interpreted Readings:   Other Readings:  Chest x-ray obtained personally reviewed by myself shows some cardiomegaly but no definite infiltrate.    Medications - No data to display  Medical Decision Making:   History:   Old Medical Records: I decided to obtain old medical records.  Differential Diagnosis:   Differential diagnosis includes but is not limited to GERD, pleurisy, pneumothorax, pneumonia, acute coronary syndrome  Clinical Tests:   Lab Tests: Ordered and Reviewed  The following lab test(s) were unremarkable: CBC, CMP, Troponin and BNP       <> Summary of Lab: Troponin was unremarkable brain atretic peptide mildly elevated at 146.   Radiological Study: Reviewed and Ordered  Medical Tests: Reviewed and Ordered  ED Management:  Patient was seen and examined saline lock established.  Lab work and x-ray was obtained.  Patient remained symptom-free throughout her emergency department stay.  Troponin was unremarkable.  Perhaps the patient had some GERD symptoms.  She said the only reason she came in is that her daughter made her.   Nevertheless she should have this continued to be evaluated in as outpatient basis.  I told her that she was not having a heart attack today but I cannot diagnose any coronary artery disease so if her symptoms return she should come back and be re-evaluated.          Scribe Attestation:   Scribe #1: I performed the above scribed service and the documentation accurately describes the services I performed. I attest to the accuracy of the note.    Attending Attestation:           Physician Attestation for Scribe:      Comments: I, Dr. Araya, personally performed the services described in this documentation. All medical record entries made by the scribe were at my direction and in my presence.  I have reviewed the chart and agree that the record reflects my personal performance and is accurate and complete. Vance Araya,   8:53 AM 02/28/2022                   Clinical Impression:   Final diagnoses:  [R07.9] Chest pain  [K29.70] Gastritis, presence of bleeding unspecified, unspecified chronicity, unspecified gastritis type (Primary)          ED Disposition Condition    Discharge Stable        ED Prescriptions     None        Follow-up Information     Follow up With Specialties Details Why Contact Info    Elan Price MD Internal Medicine, Hospitalist  If symptoms worsen [id number]  [place] [address]  [phone]             Vance Araya, DO  02/28/22 0856

## 2022-02-25 ENCOUNTER — PES CALL (OUTPATIENT)
Dept: ADMINISTRATIVE | Facility: CLINIC | Age: 62
End: 2022-02-25
Payer: COMMERCIAL

## 2022-02-25 DIAGNOSIS — F41.1 GENERALIZED ANXIETY DISORDER: ICD-10-CM

## 2022-02-25 RX ORDER — ESCITALOPRAM OXALATE 10 MG/1
10 TABLET ORAL DAILY
Qty: 10 TABLET | Refills: 0 | Status: SHIPPED | OUTPATIENT
Start: 2022-02-25 | End: 2022-03-07

## 2022-06-07 NOTE — PROGRESS NOTES
INTERNAL MEDICINE CLINIC  Follow-up Visit Progress Note     PRESENTING HISTORY     PCP: Elan Price MD    Last Clinic Visit with me: 1-    Current Chief Complaint/Problem: Follow CKD, CHF    History of Present Illness & ROS: Ms. Kathie Quezada is a 62 y.o. female.    Fell around [place] on her right buttock.    BP high at home due to pain.    Mild chest pain. No SOB.    No LE edema.    PAST HISTORY:     Past Medical History:   Diagnosis Date    Acute respiratory failure with hypoxia and hypercapnia 5/26/2019    ATN (acute tubular necrosis) 2/8/2019    Chronic diastolic heart failure 2/7/2019 2-8-19 Mild left atrial enlargement. Mild left ventricular enlargement. Normal left ventricular systolic function. The estimated ejection fraction is 55% Indeterminate left ventricular diastolic function. Normal right ventricular systolic function. Mild mitral regurgitation. Mild to moderate tricuspid regurgitation. The estimated PA systolic pressure is 32 mm Hg Normal central venous pressure (3 m    CKD stage G3a/A3, GFR 45-59 and albumin creatinine ratio >300 mg/g 5/16/2018    CKD stage G3b/A3, GFR 30-44 and albumin creatinine ratio >300 mg/g 5/16/2018    Closed compression fracture of L2 lumbar vertebra 5/24/2019    Closed compression fracture of L2 lumbar vertebra 5/24/2019     IMO Regulatory Load October 2019    Controlled type 2 diabetes with retinopathy 4/9/2018    COVID-19 virus infection 7-1-2021 7/1/2021    CVA (cerebral vascular accident) 2017    Diabetic polyneuropathy associated with type 2 diabetes mellitus 12/4/2020    Essential hypertension 4/9/2018    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  7-31-19 Factor XI Activity 55 - 145 % 96       Gastroesophageal reflux disease without esophagitis 5/24/2019    Generalized anxiety disorder 11/8/2019    GERD (gastroesophageal reflux disease)     GIB (gastrointestinal bleeding) 10/18/2018    History of  hepatitis C     s/p succesful Rx w/ Harvoni  - [id number] (cure) 2016    Hypertensive retinopathy, bilateral 12/10/2019    Mild nonproliferative diabetic retinopathy 05/02/2018    Mixed hyperlipidemia 4/9/2018    Normocytic anemia 1/14/2022    NS (nuclear sclerosis), bilateral 12/10/2019    PNA (pneumonia) 5/28/2019    Postoperative hypothyroidism     Renovascular hypertension 4/9/2018    S/P kyphoplasty 8/14/2019 8/14/19: L2 kyphoplasty with Dr. Kwon     Slow transit constipation 5/27/2019    Type 2 diabetes mellitus with circulatory disorder, with long-term current use of insulin 4/9/2018    Type 2 diabetes mellitus with stage 3 chronic kidney disease, without long-term current use of insulin 4/9/2018    Type 2 diabetes mellitus with stage 3a chronic kidney disease, without long-term current use of insulin 3/3/2021       Past Surgical History:   Procedure Laterality Date    BACK SURGERY  2017    CHOLECYSTECTOMY  2015    [facility]    COLONOSCOPY  11/13/2015    Dr Chino torres prep. internal hemorrhoids. diverticulosis of sigmois. NO polyps. repeat due 2025    FIXATION KYPHOPLASTY Bilateral 8/14/2019    Procedure: L2 kyphoplasty Fluoro Globus;  Surgeon: Jeremie Kwon DO;  Location: [facility];  Service: Neurosurgery;  Laterality: Bilateral;  GLOBUS MACRINA WINSLOW [phone] TEXTED HER @ 10:54AM ON 7-30-19  PRE-OP-BY RN  8-7-2019    HYSTERECTOMY      OOPHORECTOMY      one    TH[facility]ROID[facility]  2016    [facility] nodule    TONSILLECTOMY         Family History   Problem Relation Age of Onset    Diabetes type II Mother        Social History     Socioeconomic History    Marital status:    Tobacco Use    Smoking status: Never Smoker    Smokeless tobacco: Never Used   Substance and Sexual Activity    Alcohol use: Yes     Alcohol/week: 4.0 standard drinks     Types: 4 Cans of beer per week     Comment: weekly    Drug use: No   Social History Narrative    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 [employer]      Social Determinants of Health     Financial Resource Strain: Medium Risk    Difficulty of Paying Living Expenses: Somewhat hard   Food Insecurity: Food Insecurity Present    Worried About Running Out of Food in the Last Year: Sometimes true    Ran Out of Food in the Last Year: Sometimes true   Transportation Needs: Unmet Transportation Needs    Lack of Transportation (Medical): Yes    Lack of Transportation (Non-Medical): Yes   Physical Activity: Sufficiently Active    Days of Exercise per Week: 5 days    Minutes of Exercise per Session: 30 min   Stress: No Stress Concern Present    Feeling of Stress : Only a little   Social Connections: Unknown    Frequency of Communication with Friends and Family: More than three times a week    Frequency of Social Gatherings with Friends and Family: More than three times a week    Active Member of Clubs or Organizations: No    Attends Club or Organization Meetings: Patient refused    Marital Status:    Housing Stability: Low Risk     Unable to Pay for Housing in the Last Year: No    Number of Places Lived in the Last Year: 1    Unstable Housing in the Last Year: No       MEDICATIONS & ALLERGIES:     Current Outpatient Medications on File Prior to Visit   Medication Sig Dispense Refill    amLODIPine (NORVASC) 10 MG tablet Take 1 tablet (10 mg total) by mouth once daily. 90 tablet 3    atorvastatin (LIPITOR) 10 MG tablet Take 1 tablet (10 mg total) by mouth once daily. 90 tablet 3    carvediloL (COREG) 25 MG tablet Take 1 tablet (25 mg total) by mouth 2 (two) times daily with meals. 180 tablet 3    EScitalopram oxalate (LEXAPRO) 10 MG tablet Take 1 tablet (10 mg total) by mouth once daily. 90 tablet 3    furosemide (LASIX) 20 MG tablet Take 1 tablet (20 mg total) by mouth once daily. 90 tablet 3    peg 400/hypromellose/glycerin (VISINE DRY EYE RELIEF OPHT) Apply 1 drop to eye 2 (two) times daily as needed  (BOTH EYES).      SYNTHROID 200 mcg tablet Take 1 tablet (200 mcg total) by mouth before breakfast. 90 tablet 3    tiZANidine (ZANAFLEX) 4 MG tablet Take 1 tablet (4 mg total) by mouth every 6 (six) hours as needed (spasm). 90 tablet 1    traZODone (DESYREL) 50 MG tablet Take 1 tablet (50 mg total) by mouth nightly as needed for Insomnia. 90 tablet 3     cholecalciferol, vitamin D3, 125 mcg (5,000 unit) Tab Take 1 tablet (5,000 Units total) by mouth once daily. (Patient not taking: Reported on 6/8/2022)       No current facility-administered medications on file prior to visit.        Review of patient's allergies indicates:   Allergen Reactions    Mushroom Hives    Bactrim [sulfamethoxazole-trimethoprim] Hives    Codeine Hives    Gabapentin      Pruritis     Iodine     Peanut butter flavor     Penicillins Hives    Shellfish containing products Itching    Sulfa (sulfonamide antibiotics) Hives       Medications Reconciliation:   I have reconciled the patient's home medications and discharge medications with the patient/family. I have updated all changes.  Refer to After-Visit Medication List.    OBJECTIVE:     Vital Signs:  Vitals:    06/08/22 1055   BP: (!) 140/89   Pulse: 83   Temp:      Wt Readings from Last 3 Encounters:   06/08/22 1047 116.6 kg (257 lb 0.9 oz)   02/24/22 0931 117.9 kg (260 lb)   01/14/22 1009 114.8 kg (253 lb 1.4 oz)     Body mass index is 36.88 kg/m².     Physical Exam:  General: Well developed, well nourished. No distress.  HEENT: Head is normocephalic, atraumatic  Eyes: Clear conjunctiva.  Neck: Supple, symmetrical neck; trachea midline.  Lungs: Clear to auscultation bilaterally and normal respiratory effort.  Cardiovascular: Heart with regular rate and rhythm.    Extremities: No LE edema.  Abdomen: Abdomen is soft, non-tender non-distended with normal bowel sounds.  Skin: Skin color, texture, turgor normal. No rashes.  Neurologic: Normal strength and tone. No focal numbness or  weakness.   Psychiatric: Normal affect. Alert.    Laboratory  Lab Results   Component Value Date    WBC 12.21 02/24/2022    HGB 11.5 (L) 02/24/2022    HCT 35.7 (L) 02/24/2022     02/24/2022    CHOL 196 12/21/2021    TRIG 147 12/21/2021    HDL 62 12/21/2021    ALT 10 02/24/2022    AST 13 02/24/2022     02/24/2022    K 3.6 02/24/2022     02/24/2022    CREATININE 3.0 (H) 02/24/2022    BUN 34 (H) 02/24/2022    CO2 17 (L) 02/24/2022    TSH 1.576 12/21/2021    INR 1.0 04/13/2021    HGBA1C 6.2 (H) 12/21/2021       ASSESSMENT & PLAN:     Dorsalgia of lumbosacral region  -     Ambulatory referral/consult to [facility]; Future; Expected date: 06/15/2022  -     methylPREDNISolone (MEDROL DOSEPACK) 4 mg tablet; use as directed  Dispense: 21 tablet; Refill: 0  -     traMADoL (ULTRAM-ER) 100 MG Tb24; Take 1 tablet (100 mg total) by mouth 2 (two) times daily as needed (Pain).  Dispense: 14 tablet; Refill: 0        -   Has Zanaflex at home .    Chronic diastolic heart failure  CKD stage G3b/A3, GFR 30-44 and albumin creatinine ratio >300 mg/g  Nephrotic range proteinuria  Renovascular hypertension  - Compensated CHF.    Could not get DIgital HTN to work for her.       US 11-19-21  Preserved right and left cortical thickness, echogenicity, and corticomedullary differentiation.  No right or left renal masses, stones, or hydronephrosis.  Right renal resistive index 0.83.  Left renal resistive index 0.82.  Right kidney measures 10.7 cm.  Left kidney measures 10.9 cm.  Given level of bladder distention with urine, no intraluminal masses. Unremarkable renal ultrasound.       CHF/HTN Regimen:  -     amLODIPine (NORVASC) 10 MG tablet; Take 1 tablet (10 mg total) by mouth once daily.    -     carvediloL (COREG) 25 MG tablet; Take 1 tablet (25 mg total) by mouth 2 (two) times daily   -     furosemide (LASIX) 20 MG tablet; Take 1 tablet (20 mg total) by mouth once daily.       Type 2 diabetes mellitus with stage 3  chronic kidney disease, without long-term current use of insulin  Diabetic Neuropathy and retinopathy  - Diet controlled. A1c 6.2     Mixed hyperlipidemia  - LDL goal < 100    Last LDL 88 ()     - On Lipitor 10 mg daily.     Vitamin D deficiency  - Change from weekly to daily Vitamin D3 5000 IU daily OTC.   -     cholecalciferol, vitamin D3, 125 mcg (5,000 unit) Tab; Take 1 tablet (5,000 Units total) by mouth once daily.     Postoperative hypothyroidism  -     SYNTHROID 200 mcg tablet; Take 1 tablet (200 mcg total) by mouth before breakfast.      Normocytic anemia  -   Improved. Normal B12 and iron.     Generalized anxiety disorder  -     EScitalopram oxalate (LEXAPRO) 10 MG tablet; Take 1 tablet (10 mg total) by mouth once daily.   -     traZODone (DESYREL) 50 MG tablet; Take 1 tablet (50 mg total) by mouth nightly as needed for Insomnia.        Factor XI deficiency  - Usually get  FFP 2 units right before surgery for Factor XI deficiency     Class 2 severe obesity due to excess calories with serious comorbidity and body mass index (BMI) of 35.0 to 35.9 in adult  Body mass index is 36.31 kg/m².       Preventive Health Maintenance:  Up to date.     Encounter for screening mammogram for breast cancer  -     Mammo Digital Screening Bilat; Future; Expected date: 06/08/2022    Return to Clinic for Follow Up with me:  September 7.     Scheduled Follow-up :  Future Appointments   Date Time Provider Department Center   9/7/2022 10:00 AM Elan Price MD [facility]       After Visit Medication List :     Medication List          Accurate as of June 8, 2022 11:12 AM. If you have any questions, ask your nurse or doctor.            START taking these medications    methylPREDNISolone 4 mg tablet  Commonly known as: MEDROL DOSEPACK  use as directed  Started by: Elan Price MD     traMADoL 100 MG Tb24  Commonly known as: ULTRAM-ER  Take 1 tablet (100 mg total) by mouth 2 (two) times daily as needed  (Pain).  Started by: Elan Price MD        CONTINUE taking these medications    amLODIPine 10 MG tablet  Commonly known as: NORVASC  Take 1 tablet (10 mg total) by mouth once daily.     atorvastatin 10 MG tablet  Commonly known as: LIPITOR  Take 1 tablet (10 mg total) by mouth once daily.     carvediloL 25 MG tablet  Commonly known as: COREG  Take 1 tablet (25 mg total) by mouth 2 (two) times daily with meals.     cholecalciferol (vitamin D3) 125 mcg (5,000 unit) Tab  Take 1 tablet (5,000 Units total) by mouth once daily.     EScitalopram oxalate 10 MG tablet  Commonly known as: LEXAPRO  Take 1 tablet (10 mg total) by mouth once daily.     furosemide 20 MG tablet  Commonly known as: LASIX  Take 1 tablet (20 mg total) by mouth once daily.     SYNTHROID 200 MCG tablet  Generic drug: levothyroxine  Take 1 tablet (200 mcg total) by mouth before breakfast.     tiZANidine 4 MG tablet  Commonly known as: ZANAFLEX  Take 1 tablet (4 mg total) by mouth every 6 (six) hours as needed (spasm).     traZODone 50 MG tablet  Commonly known as: DESYREL  Take 1 tablet (50 mg total) by mouth nightly as needed for Insomnia.     VISINE DRY EYE RELIEF OPHT           Where to Get Your Medications      These medications were sent to [facility] - [place], [place] - [address]  [address] [facility] [address]    Phone: [phone]   · cholecalciferol (vitamin D3) 125 mcg (5,000 unit) Tab     These medications were sent to [facility]  [address] [place] [id number]    Hours: Mon-Fri, 8a-5:30p Phone: [phone]   · methylPREDNISolone 4 mg tablet  · traMADoL 100 MG Tb24         Signing Physician:  Elan Price MD

## 2022-06-08 ENCOUNTER — TELEPHONE (OUTPATIENT)
Dept: SPINE | Facility: CLINIC | Age: 62
End: 2022-06-08
Payer: MEDICARE

## 2022-06-08 ENCOUNTER — OFFICE VISIT (OUTPATIENT)
Dept: INTERNAL MEDICINE | Facility: CLINIC | Age: 62
End: 2022-06-08
Payer: COMMERCIAL

## 2022-06-08 ENCOUNTER — TELEPHONE (OUTPATIENT)
Dept: ORTHOPEDICS | Facility: CLINIC | Age: 62
End: 2022-06-08
Payer: MEDICARE

## 2022-06-08 VITALS
HEIGHT: 70 IN | DIASTOLIC BLOOD PRESSURE: 89 MMHG | OXYGEN SATURATION: 99 % | HEART RATE: 83 BPM | WEIGHT: 257.06 LBS | BODY MASS INDEX: 36.8 KG/M2 | SYSTOLIC BLOOD PRESSURE: 140 MMHG | TEMPERATURE: 98 F

## 2022-06-08 DIAGNOSIS — M54.50 DORSALGIA OF LUMBOSACRAL REGION: ICD-10-CM

## 2022-06-08 DIAGNOSIS — E78.2 MIXED HYPERLIPIDEMIA: ICD-10-CM

## 2022-06-08 DIAGNOSIS — F41.1 GENERALIZED ANXIETY DISORDER: ICD-10-CM

## 2022-06-08 DIAGNOSIS — E11.42 DIABETIC POLYNEUROPATHY ASSOCIATED WITH TYPE 2 DIABETES MELLITUS: ICD-10-CM

## 2022-06-08 DIAGNOSIS — E89.0 POSTOPERATIVE HYPOTHYROIDISM: ICD-10-CM

## 2022-06-08 DIAGNOSIS — N18.32 CKD STAGE G3B/A3, GFR 30-44 AND ALBUMIN CREATININE RATIO >300 MG/G: ICD-10-CM

## 2022-06-08 DIAGNOSIS — E55.9 VITAMIN D DEFICIENCY: ICD-10-CM

## 2022-06-08 DIAGNOSIS — D68.1 FACTOR XI DEFICIENCY: ICD-10-CM

## 2022-06-08 DIAGNOSIS — R80.9 NEPHROTIC RANGE PROTEINURIA: ICD-10-CM

## 2022-06-08 DIAGNOSIS — M51.36 DDD (DEGENERATIVE DISC DISEASE), LUMBAR: Primary | ICD-10-CM

## 2022-06-08 DIAGNOSIS — E66.01 CLASS 2 SEVERE OBESITY DUE TO EXCESS CALORIES WITH SERIOUS COMORBIDITY AND BODY MASS INDEX (BMI) OF 35.0 TO 35.9 IN ADULT: ICD-10-CM

## 2022-06-08 DIAGNOSIS — I15.0 RENOVASCULAR HYPERTENSION: ICD-10-CM

## 2022-06-08 DIAGNOSIS — E11.22 TYPE 2 DIABETES MELLITUS WITH STAGE 3A CHRONIC KIDNEY DISEASE, WITHOUT LONG-TERM CURRENT USE OF INSULIN: ICD-10-CM

## 2022-06-08 DIAGNOSIS — I50.32 CHRONIC DIASTOLIC HEART FAILURE: Primary | ICD-10-CM

## 2022-06-08 DIAGNOSIS — N18.31 TYPE 2 DIABETES MELLITUS WITH STAGE 3A CHRONIC KIDNEY DISEASE, WITHOUT LONG-TERM CURRENT USE OF INSULIN: ICD-10-CM

## 2022-06-08 DIAGNOSIS — Z12.31 ENCOUNTER FOR SCREENING MAMMOGRAM FOR BREAST CANCER: ICD-10-CM

## 2022-06-08 DIAGNOSIS — D64.9 NORMOCYTIC ANEMIA: ICD-10-CM

## 2022-06-08 PROCEDURE — 3079F PR MOST RECENT DIASTOLIC BLOOD PRESSURE 80-89 MM HG: ICD-10-PCS | Mod: HCNC,CPTII,S$GLB, | Performed by: INTERNAL MEDICINE

## 2022-06-08 PROCEDURE — 99999 PR PBB SHADOW E&M-EST. PATIENT-LVL V: CPT | Mod: PBBFAC,HCNC,, | Performed by: INTERNAL MEDICINE

## 2022-06-08 PROCEDURE — 3008F PR BODY MASS INDEX (BMI) DOCUMENTED: ICD-10-PCS | Mod: HCNC,CPTII,S$GLB, | Performed by: INTERNAL MEDICINE

## 2022-06-08 PROCEDURE — 1159F MED LIST DOCD IN RCRD: CPT | Mod: HCNC,CPTII,S$GLB, | Performed by: INTERNAL MEDICINE

## 2022-06-08 PROCEDURE — 99214 OFFICE O/P EST MOD 30 MIN: CPT | Mod: HCNC,S$GLB,, | Performed by: INTERNAL MEDICINE

## 2022-06-08 PROCEDURE — 3008F BODY MASS INDEX DOCD: CPT | Mod: HCNC,CPTII,S$GLB, | Performed by: INTERNAL MEDICINE

## 2022-06-08 PROCEDURE — 99214 PR OFFICE/OUTPT VISIT, EST, LEVL IV, 30-39 MIN: ICD-10-PCS | Mod: HCNC,S$GLB,, | Performed by: INTERNAL MEDICINE

## 2022-06-08 PROCEDURE — 3077F PR MOST RECENT SYSTOLIC BLOOD PRESSURE >= 140 MM HG: ICD-10-PCS | Mod: HCNC,CPTII,S$GLB, | Performed by: INTERNAL MEDICINE

## 2022-06-08 PROCEDURE — 3077F SYST BP >= 140 MM HG: CPT | Mod: HCNC,CPTII,S$GLB, | Performed by: INTERNAL MEDICINE

## 2022-06-08 PROCEDURE — 99999 PR PBB SHADOW E&M-EST. PATIENT-LVL V: ICD-10-PCS | Mod: PBBFAC,HCNC,, | Performed by: INTERNAL MEDICINE

## 2022-06-08 PROCEDURE — 1159F PR MEDICATION LIST DOCUMENTED IN MEDICAL RECORD: ICD-10-PCS | Mod: HCNC,CPTII,S$GLB, | Performed by: INTERNAL MEDICINE

## 2022-06-08 PROCEDURE — 3079F DIAST BP 80-89 MM HG: CPT | Mod: HCNC,CPTII,S$GLB, | Performed by: INTERNAL MEDICINE

## 2022-06-08 RX ORDER — ACETAMINOPHEN 500 MG
5000 TABLET ORAL DAILY
Qty: 90 TABLET | Refills: 3 | Status: ON HOLD | OUTPATIENT
Start: 2022-06-08 | End: 2022-09-12 | Stop reason: CLARIF

## 2022-06-08 RX ORDER — TRAMADOL HYDROCHLORIDE 100 MG/1
100 TABLET, EXTENDED RELEASE ORAL 2 TIMES DAILY PRN
Qty: 14 TABLET | Refills: 0 | Status: SHIPPED | OUTPATIENT
Start: 2022-06-08 | End: 2022-06-16

## 2022-06-08 RX ORDER — METHYLPREDNISOLONE 4 MG/1
TABLET ORAL
Qty: 21 TABLET | Refills: 0 | Status: SHIPPED | OUTPATIENT
Start: 2022-06-08 | End: 2022-06-29

## 2022-06-08 NOTE — PROGRESS NOTES
DATE: 6/9/2022  PATIENT: Kathie Quezada    Supervising Physician: Rojelio Sanchez M.D.    CHIEF COMPLAINT: low back pain    HISTORY:  Kathie Quezada is a 62 y.o. female with pmhx of CKD3, factor XI deficiency, and DM2 (A1c: 6.2) here for initial evaluation of low back pain (Back - 9). The pain has been present since a ground level fall onto her back about 1 week ago. The patient describes the pain as constant dull pain.  The pain is worse with standing and walking and improved by rest. There is no associated numbness and tingling. There is no subjective weakness. Prior treatments have included tylenol. Medrol dose pack, tramadol and zanaflex, but no PT, ESIs or surgery.    The patient denies myelopathic symptoms such as handwriting changes or difficulty with buttons/coins/keys. Denies perineal paresthesias, bowel/bladder dysfunction.    PAST MEDICAL/SURGICAL HISTORY:  Past Medical History:   Diagnosis Date    Acute respiratory failure with hypoxia and hypercapnia 5/26/2019    ATN (acute tubular necrosis) 2/8/2019    Chronic diastolic heart failure 2/7/2019 2-8-19 Mild left atrial enlargement. Mild left ventricular enlargement. Normal left ventricular systolic function. The estimated ejection fraction is 55% Indeterminate left ventricular diastolic function. Normal right ventricular systolic function. Mild mitral regurgitation. Mild to moderate tricuspid regurgitation. The estimated PA systolic pressure is 32 mm Hg Normal central venous pressure (3 m    CKD stage G3a/A3, GFR 45-59 and albumin creatinine ratio >300 mg/g 5/16/2018    CKD stage G3b/A3, GFR 30-44 and albumin creatinine ratio >300 mg/g 5/16/2018    Closed compression fracture of L2 lumbar vertebra 5/24/2019    Closed compression fracture of L2 lumbar vertebra 5/24/2019     IMO Regulatory Load October 2019    Controlled type 2 diabetes with retinopathy 4/9/2018    COVID-19 virus infection 7-1-2021 7/1/2021    CVA (cerebral  vascular accident) 2017    Diabetic polyneuropathy associated with type 2 diabetes mellitus 12/4/2020    Essential hypertension 4/9/2018    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  7-31-19 Factor XI Activity 55 - 145 % 96       Gastroesophageal reflux disease without esophagitis 5/24/2019    Generalized anxiety disorder 11/8/2019    GERD (gastroesophageal reflux disease)     GIB (gastrointestinal bleeding) 10/18/2018    History of hepatitis C     s/p succesful Rx w/ [facility]  - [id number] (cure) 2016    Hypertensive retinopathy, bilateral 12/10/2019    Mild nonproliferative diabetic retinopathy 05/02/2018    Mixed hyperlipidemia 4/9/2018    Normocytic anemia 1/14/2022    NS (nuclear sclerosis), bilateral 12/10/2019    PNA (pneumonia) 5/28/2019    Postoperative hypothyroidism     Renovascular hypertension 4/9/2018    S/P kyphoplasty 8/14/2019 8/14/19: L2 kyphoplasty with Dr. Kwon     Slow transit constipation 5/27/2019    Type 2 diabetes mellitus with circulatory disorder, with long-term current use of insulin 4/9/2018    Type 2 diabetes mellitus with stage 3 chronic kidney disease, without long-term current use of insulin 4/9/2018    Type 2 diabetes mellitus with stage 3a chronic kidney disease, without long-term current use of insulin 3/3/2021     Past Surgical History:   Procedure Laterality Date    BACK SURGERY  2017    CHOLECYSTECTOMY  2015    [facility]    COLONOSCOPY  11/13/2015    Dr Chino torres prep. internal hemorrhoids. diverticulosis of sigmois. NO polyps. repeat due 2025    FIXATION KYPHOPLASTY Bilateral 8/14/2019    Procedure: L2 kyphoplasty Fluoro Globus;  Surgeon: Jeremie Kwon DO;  Location: [facility];  Service: Neurosurgery;  Laterality: Bilateral;  CROW [place] [phone] TEXTED HER @ 10:54AM ON 7-30-19  PRE-OP-BY RN  8-7-2019    HYSTERECTOMY      OOPHORECTOMY      one    THYROIDECTOMY  2016    at [facility] for thyroid nodule    TONSILLECTOMY          Medications:   Current Outpatient Medications on File Prior to Visit   Medication Sig Dispense Refill    amLODIPine (NORVASC) 10 MG tablet Take 1 tablet (10 mg total) by mouth once daily. 90 tablet 3    atorvastatin (LIPITOR) 10 MG tablet Take 1 tablet (10 mg total) by mouth once daily. 90 tablet 3    carvediloL (COREG) 25 MG tablet Take 1 tablet (25 mg total) by mouth 2 (two) times daily with meals. 180 tablet 3    cholecalciferol, vitamin D3, 125 mcg (5,000 unit) Tab Take 1 tablet (5,000 Units total) by mouth once daily. 90 tablet 3    EScitalopram oxalate (LEXAPRO) 10 MG tablet Take 1 tablet (10 mg total) by mouth once daily. 90 tablet 3    furosemide (LASIX) 20 MG tablet Take 1 tablet (20 mg total) by mouth once daily. 90 tablet 3    methylPREDNISolone (MEDROL DOSEPACK) 4 mg tablet use as directed 21 tablet 0    peg 400/hypromellose/glycerin (VISINE DRY EYE RELIEF OPHT) Apply 1 drop to eye 2 (two) times daily as needed (BOTH EYES).      SYNTHROID 200 mcg tablet Take 1 tablet (200 mcg total) by mouth before breakfast. 90 tablet 3    tiZANidine (ZANAFLEX) 4 MG tablet Take 1 tablet (4 mg total) by mouth every 6 (six) hours as needed (spasm). 90 tablet 1    traMADoL (ULTRAM-ER) 100 MG Tb24 Take 1 tablet (100 mg total) by mouth 2 (two) times daily as needed (Pain). 14 tablet 0    traZODone (DESYREL) 50 MG tablet Take 1 tablet (50 mg total) by mouth nightly as needed for Insomnia. 90 tablet 3     No current facility-administered medications on file prior to visit.       Social History:   Social History     Socioeconomic History    Marital status:    Tobacco Use    Smoking status: Never Smoker    Smokeless tobacco: Never Used   Substance and Sexual Activity    Alcohol use: Yes     Alcohol/week: 4.0 standard drinks     Types: 4 Cans of beer per week     Comment: weekly    Drug use: No   Social History Narrative    Pt enjoys cooking and baking and making Flor[employer] and [employer]   "    Social Determinants of Health     Financial Resource Strain: Medium Risk    Difficulty of Paying Living Expenses: Somewhat hard   Food Insecurity: Food Insecurity Present    Worried About Running Out of Food in the Last Year: Sometimes true    Ran Out of Food in the Last Year: Sometimes true   Transportation Needs: Unmet Transportation Needs    Lack of Transportation (Medical): Yes    Lack of Transportation (Non-Medical): Yes   Physical Activity: Sufficiently Active    Days of Exercise per Week: 5 days    Minutes of Exercise per Session: 30 min   Stress: No Stress Concern Present    Feeling of Stress : Only a little   Social Connections: Unknown    Frequency of Communication with Friends and Family: More than three times a week    Frequency of Social Gatherings with Friends and Family: More than three times a week    Active Member of Clubs or Organizations: No    Attends Club or Organization Meetings: Patient refused    Marital Status:    Housing Stability: Low Risk     Unable to Pay for Housing in the Last Year: No    Number of Places Lived in the Last Year: 1    Unstable Housing in the Last Year: No       REVIEW OF SYSTEMS:  Constitution: Negative. Negative for chills, fever and night sweats.   Cardiovascular: Negative for chest pain and syncope.   Respiratory: Negative for cough and shortness of breath.   Gastrointestinal: See HPI. Negative for nausea/vomiting. Negative for abdominal pain.  Genitourinary: See HPI. Negative for discoloration or dysuria.  Skin: Negative for dry skin, itching and rash.   Hematologic/Lymphatic: Negative for bleeding problem. Does not bruise/bleed easily.   Musculoskeletal: Negative for falls and muscle weakness.   Neurological: See HPI. No seizures.   Endocrine: Negative for polydipsia, polyphagia and polyuria.   Allergic/Immunologic: Negative for hives and persistent infections.     EXAM:  Ht 5' 9" (1.753 m)   Wt 117.2 kg (258 lb 6.1 oz)   BMI 38.16 " kg/m²     General: The patient is a very pleasant 62 y.o. female in no apparent distress, the patient is oriented to person, place and time.  Psych: Normal mood and affect  HEENT: Vision grossly intact, hearing intact to the spoken word.  Lungs: Respirations unlabored.  Gait: Normal station and gait, no difficulty with toe or heel walk.   Skin: Dorsal lumbar skin negative for rashes, lesions, hairy patches and surgical scars. There is moderate lumbar tenderness to palpation.  Range of motion: Lumbar range of motion is acceptable.  Spinal Balance: Global saggital and coronal spinal balance acceptable, not significant for scoliosis and kyphosis.  Musculoskeletal: No pain with the range of motion of the bilateral hips. No trochanteric tenderness to palpation.  Vascular: Bilateral lower extremities warm and well perfused, dorsalis pedis pulses 2+ bilaterally.  Neurological: Normal strength and tone in all major motor groups in the bilateral lower extremities. Normal sensation to light touch in the L2-S1 dermatomes bilaterally.  Deep tendon reflexes symmetric 2+ in the bilateral lower extremities.  Negative Babinski bilaterally. Straight leg raise negative bilaterally.    IMAGING:      Today I personally reviewed AP, Lat and Flex/Ex  upright L-spine films that demonstrate no new compression fractures.       Body mass index is 38.16 kg/m².    Hemoglobin A1C   Date Value Ref Range Status   12/21/2021 6.2 (H) 4.0 - 5.6 % Final     Comment:     ADA Screening Guidelines:  5.7-6.4%  Consistent with prediabetes  >or=6.5%  Consistent with diabetes    High levels of fetal hemoglobin interfere with the HbA1C  assay. Heterozygous hemoglobin variants (HbS, HgC, etc)do  not significantly interfere with this assay.   However, presence of multiple variants may affect accuracy.     12/01/2020 5.7 (H) 4.0 - 5.6 % Final     Comment:     ADA Screening Guidelines:  5.7-6.4%  Consistent with prediabetes  >or=6.5%  Consistent with  diabetes  High levels of fetal hemoglobin interfere with the HbA1C  assay. Heterozygous hemoglobin variants (HbS, HgC, etc)do  not significantly interfere with this assay.   However, presence of multiple variants may affect accuracy.     11/03/2020 5.9 (H) 4.0 - 5.6 % Final     Comment:     ADA Screening Guidelines:  5.7-6.4%  Consistent with prediabetes  >or=6.5%  Consistent with diabetes  High levels of fetal hemoglobin interfere with the HbA1C  assay. Heterozygous hemoglobin variants (HbS, HgC, etc)do  not significantly interfere with this assay.   However, presence of multiple variants may affect accuracy.             ASSESSMENT/PLAN:    Kathie was seen today for establish care and pain.    Diagnoses and all orders for this visit:    Acute bilateral low back pain without sciatica    Dorsalgia of lumbosacral region  -     [facility] referral[facility]cons[facility]        Today we discussed at length all of the different treatment options including anti-inflammatories, acetaminophen, rest, ice, heat, physical therapy including strengthening and stretching exercises, home exercises, ROM, aerobic conditioning, aqua therapy, other modalities including ultrasound, massage, and dry needling, epidural steroid injections and finally surgical intervention.      The patient will follow up in 4-6 weeks if her pain continues.

## 2022-06-09 ENCOUNTER — OFFICE VISIT (OUTPATIENT)
Dept: ORTHOPEDICS | Facility: CLINIC | Age: 62
End: 2022-06-09
Payer: COMMERCIAL

## 2022-06-09 ENCOUNTER — HOSPITAL ENCOUNTER (OUTPATIENT)
Dept: RADIOLOGY | Facility: HOSPITAL | Age: 62
Discharge: HOME | End: 2022-06-09
Attending: REGISTERED NURSE
Payer: COMMERCIAL

## 2022-06-09 ENCOUNTER — TELEPHONE (OUTPATIENT)
Dept: PHARMACY | Facility: CLINIC | Age: 62
End: 2022-06-09
Payer: MEDICARE

## 2022-06-09 VITALS — BODY MASS INDEX: 38.27 KG/M2 | WEIGHT: 258.38 LBS | HEIGHT: 69 IN

## 2022-06-09 DIAGNOSIS — M51.36 DDD (DEGENERATIVE DISC DISEASE), LUMBAR: ICD-10-CM

## 2022-06-09 DIAGNOSIS — M54.50 DORSALGIA OF LUMBOSACRAL REGION: ICD-10-CM

## 2022-06-09 DIAGNOSIS — M54.50 ACUTE BILATERAL LOW BACK PAIN WITHOUT SCIATICA: Primary | ICD-10-CM

## 2022-06-09 PROCEDURE — 3008F BODY MASS INDEX DOCD: CPT | Mod: HCNC,CPTII,S$GLB, | Performed by: REGISTERED NURSE

## 2022-06-09 PROCEDURE — 72110 X-RAY EXAM L-2 SPINE 4/>VWS: CPT | Mod: TC,HCNC

## 2022-06-09 PROCEDURE — 1159F PR MEDICATION LIST DOCUMENTED IN MEDICAL RECORD: ICD-10-PCS | Mod: HCNC,CPTII,S$GLB, | Performed by: REGISTERED NURSE

## 2022-06-09 PROCEDURE — 72110 X-RAY EXAM L-2 SPINE 4/>VWS: CPT | Mod: 26,HCNC,, | Performed by: RADIOLOGY

## 2022-06-09 PROCEDURE — 72110 XR LUMBAR SPINE AP AND LAT WITH FLEX/EXT: ICD-10-PCS | Mod: 26,HCNC,, | Performed by: RADIOLOGY

## 2022-06-09 PROCEDURE — 99204 OFFICE O/P NEW MOD 45 MIN: CPT | Mod: HCNC,S$GLB,, | Performed by: REGISTERED NURSE

## 2022-06-09 PROCEDURE — 99204 PR OFFICE/OUTPT VISIT, NEW, LEVL IV, 45-59 MIN: ICD-10-PCS | Mod: HCNC,S$GLB,, | Performed by: REGISTERED NURSE

## 2022-06-09 PROCEDURE — 99999 PR PBB SHADOW E&M-EST. PATIENT-LVL III: ICD-10-PCS | Mod: PBBFAC,HCNC,, | Performed by: REGISTERED NURSE

## 2022-06-09 PROCEDURE — 99999 PR PBB SHADOW E&M-EST. PATIENT-LVL III: CPT | Mod: PBBFAC,HCNC,, | Performed by: REGISTERED NURSE

## 2022-06-09 PROCEDURE — 3008F PR BODY MASS INDEX (BMI) DOCUMENTED: ICD-10-PCS | Mod: HCNC,CPTII,S$GLB, | Performed by: REGISTERED NURSE

## 2022-06-09 PROCEDURE — 1159F MED LIST DOCD IN RCRD: CPT | Mod: HCNC,CPTII,S$GLB, | Performed by: REGISTERED NURSE

## 2022-07-05 ENCOUNTER — PATIENT MESSAGE (OUTPATIENT)
Dept: ADMINISTRATIVE | Facility: OTHER | Age: 62
End: 2022-07-05
Payer: MEDICARE

## 2022-07-06 ENCOUNTER — TELEPHONE (OUTPATIENT)
Dept: RADIOLOGY | Facility: HOSPITAL | Age: 62
End: 2022-07-06
Payer: MEDICARE

## 2022-07-06 NOTE — TELEPHONE ENCOUNTER
Patient called back & I explained that we had to move her appointment. SHe asked if anything was wrong with her & I explained that a piece of equipment is going to be down. I told patient that I would send her a message with the address to [employer]. Patient thanked me.

## 2022-07-12 ENCOUNTER — HOSPITAL ENCOUNTER (OUTPATIENT)
Dept: RADIOLOGY | Facility: HOSPITAL | Age: 62
Discharge: HOME | End: 2022-07-12
Attending: INTERNAL MEDICINE
Payer: COMMERCIAL

## 2022-07-12 ENCOUNTER — PATIENT MESSAGE (OUTPATIENT)
Dept: ADMINISTRATIVE | Facility: HOSPITAL | Age: 62
End: 2022-07-12
Payer: MEDICARE

## 2022-07-12 DIAGNOSIS — Z12.31 ENCOUNTER FOR SCREENING MAMMOGRAM FOR BREAST CANCER: ICD-10-CM

## 2022-07-12 PROCEDURE — 77063 BREAST TOMOSYNTHESIS BI: CPT | Mod: 26,HCNC,, | Performed by: RADIOLOGY

## 2022-07-12 PROCEDURE — 77063 BREAST TOMOSYNTHESIS BI: CPT | Mod: TC,HCNC

## 2022-07-12 PROCEDURE — 77067 MAMMO DIGITAL SCREENING BILAT WITH TOMO: ICD-10-PCS | Mod: 26,HCNC,, | Performed by: RADIOLOGY

## 2022-07-12 PROCEDURE — 77067 SCR MAMMO BI INCL CAD: CPT | Mod: TC,HCNC

## 2022-07-12 PROCEDURE — 77063 MAMMO DIGITAL SCREENING BILAT WITH TOMO: ICD-10-PCS | Mod: 26,HCNC,, | Performed by: RADIOLOGY

## 2022-07-12 PROCEDURE — 77067 SCR MAMMO BI INCL CAD: CPT | Mod: 26,HCNC,, | Performed by: RADIOLOGY

## 2022-07-30 ENCOUNTER — HOSPITAL ENCOUNTER (EMERGENCY)
Facility: HOSPITAL | Age: 62
Discharge: HOME | End: 2022-07-30
Attending: EMERGENCY MEDICINE
Payer: COMMERCIAL

## 2022-07-30 VITALS
BODY MASS INDEX: 37.03 KG/M2 | HEART RATE: 69 BPM | WEIGHT: 250 LBS | HEIGHT: 69 IN | OXYGEN SATURATION: 98 % | RESPIRATION RATE: 16 BRPM | SYSTOLIC BLOOD PRESSURE: 201 MMHG | DIASTOLIC BLOOD PRESSURE: 103 MMHG | TEMPERATURE: 99 F

## 2022-07-30 DIAGNOSIS — R07.89 CHEST DISCOMFORT: ICD-10-CM

## 2022-07-30 DIAGNOSIS — R10.9 ABDOMINAL PAIN, UNSPECIFIED ABDOMINAL LOCATION: Primary | ICD-10-CM

## 2022-07-30 LAB
ALBUMIN SERPL BCP-MCNC: 3.5 G/DL (ref 3.5–5.2)
ALP SERPL-CCNC: 95 U/L (ref 55–135)
ALT SERPL W/O P-5'-P-CCNC: 7 U/L (ref 10–44)
ANION GAP SERPL CALC-SCNC: 13 MMOL/L (ref 8–16)
AST SERPL-CCNC: 15 U/L (ref 10–40)
BACTERIA #/AREA URNS AUTO: NORMAL /HPF
BASOPHILS # BLD AUTO: 0.08 K/UL (ref 0–0.2)
BASOPHILS NFR BLD: 0.7 % (ref 0–1.9)
BILIRUB SERPL-MCNC: 0.6 MG/DL (ref 0.1–1)
BILIRUB UR QL STRIP: NEGATIVE
BNP SERPL-MCNC: 64 PG/ML (ref 0–99)
BUN SERPL-MCNC: 32 MG/DL (ref 6–20)
CALCIUM SERPL-MCNC: 8.6 MG/DL (ref 8.7–10.5)
CHLORIDE SERPL-SCNC: 108 MMOL/L (ref 95–110)
CLARITY UR REFRACT.AUTO: CLEAR
CO2 SERPL-SCNC: 16 MMOL/L (ref 23–29)
COLOR UR AUTO: YELLOW
CREAT SERPL-MCNC: 3.6 MG/DL (ref 0.5–1.4)
DIFFERENTIAL METHOD: ABNORMAL
EOSINOPHIL # BLD AUTO: 0.1 K/UL (ref 0–0.5)
EOSINOPHIL NFR BLD: 1.2 % (ref 0–8)
ERYTHROCYTE [DISTWIDTH] IN BLOOD BY AUTOMATED COUNT: 13.7 % (ref 11.5–14.5)
EST. GFR  (AFRICAN AMERICAN): 15 ML/MIN/1.73 M^2
EST. GFR  (NON AFRICAN AMERICAN): 13 ML/MIN/1.73 M^2
GLUCOSE SERPL-MCNC: 191 MG/DL (ref 70–110)
GLUCOSE UR QL STRIP: ABNORMAL
HCT VFR BLD AUTO: 35.4 % (ref 37–48.5)
HGB BLD-MCNC: 11.5 G/DL (ref 12–16)
HGB UR QL STRIP: ABNORMAL
HYALINE CASTS UR QL AUTO: 0 /LPF
IMM GRANULOCYTES # BLD AUTO: 0.05 K/UL (ref 0–0.04)
IMM GRANULOCYTES NFR BLD AUTO: 0.4 % (ref 0–0.5)
KETONES UR QL STRIP: NEGATIVE
LEUKOCYTE ESTERASE UR QL STRIP: NEGATIVE
LIPASE SERPL-CCNC: 46 U/L (ref 4–60)
LYMPHOCYTES # BLD AUTO: 2.4 K/UL (ref 1–4.8)
LYMPHOCYTES NFR BLD: 19.3 % (ref 18–48)
MCH RBC QN AUTO: 28 PG (ref 27–31)
MCHC RBC AUTO-ENTMCNC: 32.5 G/DL (ref 32–36)
MCV RBC AUTO: 86 FL (ref 82–98)
MICROSCOPIC COMMENT: NORMAL
MONOCYTES # BLD AUTO: 0.6 K/UL (ref 0.3–1)
MONOCYTES NFR BLD: 4.8 % (ref 4–15)
NEUTROPHILS # BLD AUTO: 9 K/UL (ref 1.8–7.7)
NEUTROPHILS NFR BLD: 73.6 % (ref 38–73)
NITRITE UR QL STRIP: NEGATIVE
NRBC BLD-RTO: 0 /100 WBC
PH UR STRIP: 5 [PH] (ref 5–8)
PLATELET # BLD AUTO: 160 K/UL (ref 150–450)
PMV BLD AUTO: 11.2 FL (ref 9.2–12.9)
POTASSIUM SERPL-SCNC: 4.3 MMOL/L (ref 3.5–5.1)
PROT SERPL-MCNC: 7.3 G/DL (ref 6–8.4)
PROT UR QL STRIP: ABNORMAL
RBC # BLD AUTO: 4.1 M/UL (ref 4–5.4)
RBC #/AREA URNS AUTO: 1 /HPF (ref 0–4)
SODIUM SERPL-SCNC: 137 MMOL/L (ref 136–145)
SP GR UR STRIP: 1.01 (ref 1–1.03)
TROPONIN I SERPL DL<=0.01 NG/ML-MCNC: <0.006 NG/ML (ref 0–0.03)
URN SPEC COLLECT METH UR: ABNORMAL
WBC # BLD AUTO: 12.17 K/UL (ref 3.9–12.7)
WBC #/AREA URNS AUTO: 1 /HPF (ref 0–5)

## 2022-07-30 PROCEDURE — 99285 EMERGENCY DEPT VISIT HI MDM: CPT | Mod: HCNC,,, | Performed by: PHYSICIAN ASSISTANT

## 2022-07-30 PROCEDURE — 99285 EMERGENCY DEPT VISIT HI MDM: CPT | Mod: 25,HCNC

## 2022-07-30 PROCEDURE — 93005 ELECTROCARDIOGRAM TRACING: CPT | Mod: HCNC

## 2022-07-30 PROCEDURE — 63600175 PHARM REV CODE 636 W HCPCS: Mod: HCNC | Performed by: PHYSICIAN ASSISTANT

## 2022-07-30 PROCEDURE — 93010 ELECTROCARDIOGRAM REPORT: CPT | Mod: HCNC,,, | Performed by: INTERNAL MEDICINE

## 2022-07-30 PROCEDURE — 99285 PR EMERGENCY DEPT VISIT,LEVEL V: ICD-10-PCS | Mod: HCNC,,, | Performed by: PHYSICIAN ASSISTANT

## 2022-07-30 PROCEDURE — 25000003 PHARM REV CODE 250: Mod: HCNC | Performed by: PHYSICIAN ASSISTANT

## 2022-07-30 PROCEDURE — 83690 ASSAY OF LIPASE: CPT | Mod: HCNC | Performed by: PHYSICIAN ASSISTANT

## 2022-07-30 PROCEDURE — 81001 URINALYSIS AUTO W/SCOPE: CPT | Mod: HCNC | Performed by: PHYSICIAN ASSISTANT

## 2022-07-30 PROCEDURE — 84484 ASSAY OF TROPONIN QUANT: CPT | Mod: HCNC | Performed by: PHYSICIAN ASSISTANT

## 2022-07-30 PROCEDURE — 80053 COMPREHEN METABOLIC PANEL: CPT | Mod: HCNC | Performed by: PHYSICIAN ASSISTANT

## 2022-07-30 PROCEDURE — 83880 ASSAY OF NATRIURETIC PEPTIDE: CPT | Mod: HCNC | Performed by: PHYSICIAN ASSISTANT

## 2022-07-30 PROCEDURE — 96375 TX/PRO/DX INJ NEW DRUG ADDON: CPT | Mod: HCNC

## 2022-07-30 PROCEDURE — 85025 COMPLETE CBC W/AUTO DIFF WBC: CPT | Mod: HCNC | Performed by: PHYSICIAN ASSISTANT

## 2022-07-30 PROCEDURE — 96374 THER/PROPH/DIAG INJ IV PUSH: CPT | Mod: HCNC

## 2022-07-30 PROCEDURE — 93010 EKG 12-LEAD: ICD-10-PCS | Mod: HCNC,,, | Performed by: INTERNAL MEDICINE

## 2022-07-30 PROCEDURE — 87389 HIV-1 AG W/HIV-1&-2 AB AG IA: CPT | Mod: HCNC | Performed by: PHYSICIAN ASSISTANT

## 2022-07-30 RX ORDER — MORPHINE SULFATE 4 MG/ML
4 INJECTION, SOLUTION INTRAMUSCULAR; INTRAVENOUS
Status: COMPLETED | OUTPATIENT
Start: 2022-07-30 | End: 2022-07-30

## 2022-07-30 RX ORDER — DIPHENOXYLATE HYDROCHLORIDE AND ATROPINE SULFATE 2.5; .025 MG/1; MG/1
1 TABLET ORAL
Status: COMPLETED | OUTPATIENT
Start: 2022-07-30 | End: 2022-07-30

## 2022-07-30 RX ORDER — ONDANSETRON 4 MG/1
4 TABLET, ORALLY DISINTEGRATING ORAL EVERY 8 HOURS PRN
Qty: 12 TABLET | Refills: 0 | Status: SHIPPED | OUTPATIENT
Start: 2022-07-30 | End: 2022-08-09 | Stop reason: SDUPTHER

## 2022-07-30 RX ORDER — LIDOCAINE HYDROCHLORIDE 20 MG/ML
15 SOLUTION OROPHARYNGEAL ONCE
Status: COMPLETED | OUTPATIENT
Start: 2022-07-30 | End: 2022-07-30

## 2022-07-30 RX ORDER — MAG HYDROX/ALUMINUM HYD/SIMETH 200-200-20
30 SUSPENSION, ORAL (FINAL DOSE FORM) ORAL ONCE
Status: COMPLETED | OUTPATIENT
Start: 2022-07-30 | End: 2022-07-30

## 2022-07-30 RX ORDER — CARVEDILOL 12.5 MG/1
25 TABLET ORAL
Status: COMPLETED | OUTPATIENT
Start: 2022-07-30 | End: 2022-07-30

## 2022-07-30 RX ORDER — ONDANSETRON 2 MG/ML
4 INJECTION INTRAMUSCULAR; INTRAVENOUS
Status: COMPLETED | OUTPATIENT
Start: 2022-07-30 | End: 2022-07-30

## 2022-07-30 RX ORDER — DIPHENHYDRAMINE HYDROCHLORIDE 50 MG/ML
50 INJECTION INTRAMUSCULAR; INTRAVENOUS
Status: COMPLETED | OUTPATIENT
Start: 2022-07-30 | End: 2022-07-30

## 2022-07-30 RX ADMIN — DIPHENHYDRAMINE HYDROCHLORIDE 50 MG: 50 INJECTION, SOLUTION INTRAMUSCULAR; INTRAVENOUS at 03:07

## 2022-07-30 RX ADMIN — ONDANSETRON 4 MG: 2 INJECTION INTRAMUSCULAR; INTRAVENOUS at 02:07

## 2022-07-30 RX ADMIN — MORPHINE SULFATE 4 MG: 4 INJECTION INTRAVENOUS at 05:07

## 2022-07-30 RX ADMIN — DIPHENOXYLATE HYDROCHLORIDE AND ATROPINE SULFATE 1 TABLET: 2.5; .025 TABLET ORAL at 03:07

## 2022-07-30 RX ADMIN — ALUMINUM HYDROXIDE, MAGNESIUM HYDROXIDE, AND SIMETHICONE 30 ML: 200; 200; 20 SUSPENSION ORAL at 02:07

## 2022-07-30 RX ADMIN — LIDOCAINE HYDROCHLORIDE 15 ML: 20 SOLUTION ORAL; TOPICAL at 02:07

## 2022-07-30 RX ADMIN — CARVEDILOL 25 MG: 12.5 TABLET, FILM COATED ORAL at 05:07

## 2022-07-30 NOTE — ED NOTES
Pt presents to the ER c/o abdominal pains in the RUQ precipitated by cold drinks. Reports 7/10 sharp, continuous pain. Reports chills, nausea, HA, diarrhea, and intermittent CP w/ SOB that started a week ago. Hx of gallbladder removal. Denies vomiting.

## 2022-07-30 NOTE — ED PROVIDER NOTES
"Encounter Date: 7/30/2022       History     Chief Complaint   Patient presents with    Abdominal Pain     Pain to r side of abd and chest pain     63 y/o F with history of CKD, DM2, HTN, HF, hyperlipidemia, GERD presents to the ED c/o RUQ abdominal pain x 1 week.  The pain has been constant, radiating to the back, worse with cold drinks. She reports the pain is 7/10 "sharp/tight", she has not taken any OTC medications. She does report some sternal chest discomfort that is intermittent x 1 week, worse with movement. She reports associated chills, SOB, nausea without vomiting, diarrhea, headache, lightheadedness. No known sick contacts. She denies fever, dysuria. PSHx significant for hysterectomy, oophorectomy, cholecystectomy.     The history is provided by the patient.     Review of patient's allergies indicates:   Allergen Reactions    Mushroom Hives    Bactrim [sulfamethoxazole-trimethoprim] Hives    Codeine Hives    Gabapentin      Pruritis     Iodine     Peanut butter flavor     Penicillins Hives    Shellfish containing products Itching    Sulfa (sulfonamide antibiotics) Hives     Past Medical History:   Diagnosis Date    Acute respiratory failure with hypoxia and hypercapnia 5/26/2019    ATN (acute tubular necrosis) 2/8/2019    Chronic diastolic heart failure 2/7/2019 2-8-19 Mild left atrial enlargement. Mild left ventricular enlargement. Normal left ventricular systolic function. The estimated ejection fraction is 55% Indeterminate left ventricular diastolic function. Normal right ventricular systolic function. Mild mitral regurgitation. Mild to moderate tricuspid regurgitation. The estimated PA systolic pressure is 32 mm Hg Normal central venous pressure (3 m    CKD stage G3a/A3, GFR 45-59 and albumin creatinine ratio >300 mg/g 5/16/2018    CKD stage G3b/A3, GFR 30-44 and albumin creatinine ratio >300 mg/g 5/16/2018    Closed compression fracture of L2 lumbar vertebra 5/24/2019    Closed " compression fracture of L2 lumbar vertebra 5/24/2019     IMO Regulatory Load October 2019    Controlled type 2 diabetes with retinopathy 4/9/2018    COVID-19 virus infection 7-1-2021 7/1/2021    CVA (cerebral vascular accident) 2017    Diabetic polyneuropathy associated with type 2 diabetes mellitus 12/4/2020    Essential hypertension 4/9/2018    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  7-31-19 Factor XI Activity 55 - 145 % 96       Gastroesophageal reflux disease without esophagitis 5/24/2019    Generalized anxiety disorder 11/8/2019    GERD (gastroesophageal reflux disease)     GIB (gastrointestinal bleeding) 10/18/2018    History of hepatitis C     s/p succesful Rx w/ [facility]  - SVR12 (cure) 2016    Hypertensive retinopathy, bilateral 12/10/2019    Mild nonproliferative diabetic retinopathy 05/02/2018    Mixed hyperlipidemia 4/9/2018    Normocytic anemia 1/14/2022    NS (nuclear sclerosis), bilateral 12/10/2019    PNA (pneumonia) 5/28/2019    Postoperative hypothyroidism     Renovascular hypertension 4/9/2018    S/P kyphoplasty 8/14/2019 8/14/19: L2 kyphoplasty with Dr. Kwon     Slow transit constipation 5/27/2019    Type 2 diabetes mellitus with circulatory disorder, with long-term current use of insulin 4/9/2018    Type 2 diabetes mellitus with stage 3 chronic kidney disease, without long-term current use of insulin 4/9/2018    Type 2 diabetes mellitus with stage 3a chronic kidney disease, without long-term current use of insulin 3/3/2021     Past Surgical History:   Procedure Laterality Date    BACK SURGERY  2017    CHOLECYSTECTOMY  2015    [facility]    COLONOSCOPY  11/13/2015    Dr Chino torres prep. internal hemorrhoids. diverticulosis of sigmois. NO polyps. repeat due 2025    FIXATION KYPHOPLASTY Bilateral 08/14/2019    Procedure: L2 kyphoplasty Fluoro Globus;  Surgeon: Jeremie Kwon DO;  Location: [facility];  Service: Neurosurgery;  Laterality:  Bilateral;  CROW [place] [phone] TEXTED HER @ 10:54AM ON 7-30-19  PRE-OP-BY RN  8-7-2019    HYSTERECTOMY      OOPHORECTOMY  1996    one    THYROIDECT[facility]Y  2016    [facility] nodule    TONSILLECTOMY       Family History   Problem Relation Age of Onset    Diabetes type II Mother      Social History     Tobacco Use    Smoking status: Never Smoker    Smokeless tobacco: Never Used   Substance Use Topics    Alcohol use: Yes     Alcohol/week: 4.0 standard drinks     Types: 4 Cans of beer per week     Comment: weekly    Drug use: No     Review of Systems   Constitutional: Positive for chills. Negative for fever.   HENT: Negative for congestion and sore throat.    Respiratory: Positive for shortness of breath. Negative for cough.    Cardiovascular: Positive for chest pain.   Gastrointestinal: Positive for abdominal pain, diarrhea and nausea. Negative for constipation and vomiting.   Genitourinary: Negative for dysuria and hematuria.   Musculoskeletal: Negative for back pain.   Skin: Negative for rash.   Neurological: Positive for headaches. Negative for weakness.   Psychiatric/Behavioral: Negative for confusion.       Physical Exam     Initial Vitals   BP Pulse Resp Temp SpO2   07/30/22 1315 07/30/22 1315 07/30/22 1315 07/30/22 1315 07/30/22 1508   (!) 169/84 85 18 98.9 °F (37.2 °C) 99 %      MAP       --                Physical Exam    Nursing note and vitals reviewed.  Constitutional: She appears well-developed and well-nourished. She is not diaphoretic. No distress.   HENT:   Head: Normocephalic and atraumatic.   Neck: Neck supple.   Normal range of motion.  Cardiovascular: Normal rate, regular rhythm and normal heart sounds. Exam reveals no gallop and no friction rub.    No murmur heard.  Pulmonary/Chest: Breath sounds normal. She has no wheezes. She has no rhonchi. She has no rales. She exhibits no tenderness.   Abdominal: Abdomen is soft. Bowel sounds are normal. There is abdominal tenderness.    Minimal tenderness to the RUQ/R flank   No right CVA tenderness.  No left CVA tenderness. There is no rebound and no guarding.   Musculoskeletal:         General: Normal range of motion.      Cervical back: Normal range of motion and neck supple.     Neurological: She is alert and oriented to person, place, and time.   Skin: Skin is warm and dry. No rash noted. No erythema.   Psychiatric: She has a normal mood and affect.         ED Course   Procedures  Labs Reviewed   CBC W/ AUTO DIFFERENTIAL - Abnormal; Notable for the following components:       Result Value    Hemoglobin 11.5 (*)     Hematocrit 35.4 (*)     Gran # (ANC) 9.0 (*)     Immature Grans (Abs) 0.05 (*)     Gran % 73.6 (*)     All other components within normal limits   COMPREHENSIVE METABOLIC PANEL - Abnormal; Notable for the following components:    CO2 16 (*)     Glucose 191 (*)     BUN 32 (*)     Creatinine 3.6 (*)     Calcium 8.6 (*)     ALT 7 (*)     eGFR if  15.0 (*)     eGFR if non  13.0 (*)     All other components within normal limits   URINALYSIS, REFLEX TO URINE CULTURE - Abnormal; Notable for the following components:    Protein, UA 2+ (*)     Glucose, UA 2+ (*)     Occult Blood UA 1+ (*)     All other components within normal limits    Narrative:     Specimen Source->Urine   LIPASE   TROPONIN I   B-TYPE NATRIURETIC PEPTIDE   URINALYSIS MICROSCOPIC    Narrative:     Specimen Source->Urine   HIV 1 / 2 ANTIBODY          Imaging Results          CT Abdomen Pelvis  Without Contrast (Final result)  Result time 07/30/22 18:51:09    Final result by Phani Hinojosa MD (07/30/22 18:51:09)                 Impression:      1. No evidence of acute abnormalities.  2. Postoperative changes of remote gastric bypass without acute complication.  3. Cholecystectomy.  4. Colonic diverticula without diverticulitis.  5. Osseous degenerative changes with worsening T11 vertebral body height loss and new T10-11 disc space vacuum  phenomena.  No new fracture.  6. Additional findings as above.    Electronically signed by resident: Nedra Michele  Date:    07/30/2022  Time:    17:26    Electronically signed by: Phani Hinojosa MD  Date:    07/30/2022  Time:    18:51             Narrative:    EXAMINATION:  CT ABDOMEN PELVIS WITHOUT CONTRAST    CLINICAL HISTORY:  Abdominal pain, acute, nonlocalized;    TECHNIQUE:  Low dose axial images, sagittal and coronal reformations were obtained from the lung bases to the pubic symphysis.  No oral or IV contrast.    COMPARISON:  Ultrasound retroperitoneal 11/19/2021 at CT renal 06/09/2021    FINDINGS:  Heart: Mildly enlarged, stable.  Stable small pericardial effusion.  Prominent right cardiophrenic lymph node, stable.    Lung Bases: Stable bibasal atelectatic changes.  No large consolidation.  No pleural fluid.    Liver: Normal in size and attenuation, with no focal hepatic lesions.    Gallbladder: Surgically absent.    Bile Ducts: No significant biliary ductal dilatation.    Pancreas: No mass or peripancreatic fat stranding.    Spleen: Normal in size without focal lesion.  Small splenule.    Adrenals: Unremarkable.    Kidneys/ Ureters: Normal in size and location with scattered areas grossly similar cortical scarring.  No hydronephrosis or nephrolithiasis. No ureteral dilatation.  Bilateral perinephric fat stranding, similar to prior.    Bladder: No evidence of wall thickening.    Reproductive organs: Uterus is not identified likely surgically absent.  No adnexal mass.    Distal esophagus is within normal limits.  Evidence of prior gastric bypass without acute complication.    Bowel/Mesentery: Small bowel is normal in caliber with no evidence of obstruction. No evidence of inflammation or wall thickening.  Colonic diverticuli without wall thickening to suggest acute diverticulitis.  Appendix is normal.    Peritoneal Space: No ascites. No free air.    Retroperitoneum: No significant adenopathy.  Phleboliths  are noted.    Abdominal wall: Abdominal wall surgical changes with umbilical and periumbilical fat containing hernia.    Vasculature: Mild aortoiliac calcific atherosclerosis.  No aneurysm.    Bones: Degenerative changes.  Interval increased T11 vertebral body height loss in comparison to prior with new T10-11 intervertebral gas formation correlating with intervertebral disc vacuum phenomena without acute vertebral body fracture.    T12 and L2 vertebroplasty changes.  No acute fracture.                               X-Ray Chest PA And Lateral (Final result)  Result time 07/30/22 15:04:08    Final result by Phani Hinojosa MD (07/30/22 15:04:08)                 Impression:      No detrimental change or radiographic acute intrathoracic process seen.      Electronically signed by: Phani Hinojosa MD  Date:    07/30/2022  Time:    15:04             Narrative:    EXAMINATION:  XR CHEST PA AND LATERAL    CLINICAL HISTORY:  chest pain;    TECHNIQUE:  PA and lateral views of the chest were performed.    COMPARISON:  Chest radiograph 02/24/2022    FINDINGS:  Patient is slightly rotated.  Resolution is somewhat limited by body habitus with underpenetration.    No detrimental change.Few scattered linear opacities throughout the lungs consistent with minimal platelike scarring versus atelectasis.  Lungs are otherwise well expanded without consolidation, pleural effusion or pneumothorax.    Mediastinal structures are midline.  Grossly similar tortuosity of the aorta.  Cardiac silhouette is upper limits of normal in size similar prior without evidence of failure.  Pulmonary vasculature and hilar contours are within normal limits.    Osseous structures appear stable without acute process seen noting upper thoracolumbar junction prior vertebroplasty.  Cholecystectomy clips.                                 Medications   aluminum-magnesium hydroxide-simethicone 200-200-20 mg/5 mL suspension 30 mL (30 mLs Oral Given 7/30/22 1442)     And    LIDOcaine HCl 2% oral solution 15 mL (15 mLs Oral Given 7/30/22 1441)   ondansetron injection 4 mg (4 mg Intravenous Given 7/30/22 1442)   diphenoxylate-atropine 2.5-0.025 mg per tablet 1 tablet (1 tablet Oral Given 7/30/22 1506)   diphenhydrAMINE injection 50 mg (50 mg Intravenous Given 7/30/22 1507)   morphine injection 4 mg (4 mg Intravenous Given 7/30/22 1711)   carvediloL tablet 25 mg (25 mg Oral Given 7/30/22 1710)     Medical Decision Making:   History:   Old Medical Records: I decided to obtain old medical records.  Clinical Tests:   Lab Tests: Ordered and Reviewed  Radiological Study: Ordered and Reviewed       APC / Resident Notes:   63 y/o F with history of CKD, DM2, HTN, HF, hyperlipidemia, GERD presents to the ED c/o RUQ abdominal pain x 1 week.  VSS. RRR. Lungs clear. Abdomen soft, with minimal tenderness to the R upper abdomen and R flank. No rashes. No CVA tenderness. No chest wall tenderness. DDx includes but is not limited to gastroenteritis, gastritis, pancreatitis, UTI, pyelonephritis, ACS. Will get labs, CXR, CT abdomen/pelvis.  She has an iodine allergy but has had IV contrast in the past without problems with premedicated with benadryl. IV benadryl ordered.    UA with no infection. No leukocytosis. Cr 3.6, slightly worse than baseline. Lipase normal. Troponin normal. BNP normal.     CXR with no acute abnormalities.  CT abdomen/pelvis with no acute abnormalities.    Symptoms improved with treatment in the ED. Unclear etiology of abdominal pain. ?shingles prodrome.    I do not feel that she needs any further labs or imaging at this time. Stable for discharge.     She was discharged with a prescription for zofran.  She will follow up with her PCP.  Strict ED return precautions given.  All of the patient's questions were answered.  I reviewed the patient's chart, labs, and imaging.                   Clinical Impression:   Final diagnoses:  [R07.89] Chest discomfort  [R10.9] Abdominal pain,  unspecified abdominal location (Primary)          ED Disposition Condition    Discharge Stable        ED Prescriptions     Medication Sig Dispense Start Date End Date Auth. Provider    ondansetron (ZOFRAN-ODT) 4 MG TbDL Take 1 tablet (4 mg total) by mouth every 8 (eight) hours as needed (nausea). 12 tablet 7/30/2022  Sade Gonzales PA-C        Follow-up Information     Follow up With Specialties Details Why Contact Info    Elan Price MD Internal Medicine, Hospitalist Schedule an appointment as soon as possible for a visit   [address]  [place] [address]  [phone]             Sade Gonzales PA-C  07/30/22 [id number]

## 2022-07-30 NOTE — PROVIDER PROGRESS NOTES - EMERGENCY DEPT.
Encounter Date: 7/30/2022    ED Physician Progress Notes       SCRIBE NOTE: Karin HOOKER am scribing for, and in the presence of,  Guillaume Power DO.  Physician Statement: Guillaume HOOKER DO, personally performed the services described in this documentation as scribed by Karin Lee in my presence, and it is both accurate and complete.     Physician Note:   EKG Reading:  Normal sinus rhythm   Rate: 82  Non-specific T wave inversions in inferior and lateral leads.    EKG - STEMI Decision  Initial Reading: No STEMI present.

## 2022-08-02 LAB — HIV 1+2 AB+HIV1 P24 AG SERPL QL IA: NEGATIVE

## 2022-08-09 ENCOUNTER — PATIENT OUTREACH (OUTPATIENT)
Dept: ADMINISTRATIVE | Facility: HOSPITAL | Age: 62
End: 2022-08-09
Payer: MEDICARE

## 2022-08-09 DIAGNOSIS — R11.0 NAUSEA: Primary | ICD-10-CM

## 2022-08-09 DIAGNOSIS — R11.0 NAUSEA: ICD-10-CM

## 2022-08-09 RX ORDER — ONDANSETRON 4 MG/1
4 TABLET, ORALLY DISINTEGRATING ORAL EVERY 8 HOURS PRN
Qty: 90 TABLET | Refills: 2 | Status: SHIPPED | OUTPATIENT
Start: 2022-08-09 | End: 2022-08-09 | Stop reason: SDUPTHER

## 2022-08-09 RX ORDER — ONDANSETRON 4 MG/1
4 TABLET, ORALLY DISINTEGRATING ORAL EVERY 8 HOURS PRN
Qty: 90 TABLET | Refills: 2 | Status: SHIPPED | OUTPATIENT
Start: 2022-08-09 | End: 2023-01-04

## 2022-09-07 ENCOUNTER — OFFICE VISIT (OUTPATIENT)
Dept: INTERNAL MEDICINE | Facility: CLINIC | Age: 62
End: 2022-09-07
Payer: COMMERCIAL

## 2022-09-07 ENCOUNTER — PATIENT MESSAGE (OUTPATIENT)
Dept: INTERNAL MEDICINE | Facility: CLINIC | Age: 62
End: 2022-09-07

## 2022-09-07 ENCOUNTER — LAB VISIT (OUTPATIENT)
Dept: LAB | Facility: HOSPITAL | Age: 62
End: 2022-09-07
Attending: INTERNAL MEDICINE
Payer: COMMERCIAL

## 2022-09-07 VITALS
HEART RATE: 81 BPM | BODY MASS INDEX: 38.95 KG/M2 | SYSTOLIC BLOOD PRESSURE: 148 MMHG | HEIGHT: 69 IN | OXYGEN SATURATION: 98 % | DIASTOLIC BLOOD PRESSURE: 90 MMHG | WEIGHT: 263 LBS

## 2022-09-07 DIAGNOSIS — M79.10 MYALGIA DUE TO STATIN: ICD-10-CM

## 2022-09-07 DIAGNOSIS — E11.42 DIABETIC POLYNEUROPATHY ASSOCIATED WITH TYPE 2 DIABETES MELLITUS: ICD-10-CM

## 2022-09-07 DIAGNOSIS — E66.01 CLASS 2 SEVERE OBESITY DUE TO EXCESS CALORIES WITH SERIOUS COMORBIDITY AND BODY MASS INDEX (BMI) OF 35.0 TO 35.9 IN ADULT: ICD-10-CM

## 2022-09-07 DIAGNOSIS — T46.6X5A MYALGIA DUE TO STATIN: ICD-10-CM

## 2022-09-07 DIAGNOSIS — N18.4 CKD (CHRONIC KIDNEY DISEASE) STAGE 4, GFR 15-29 ML/MIN: ICD-10-CM

## 2022-09-07 DIAGNOSIS — E78.2 MIXED HYPERLIPIDEMIA: ICD-10-CM

## 2022-09-07 DIAGNOSIS — E11.3553 CONTROLLED TYPE 2 DIABETES MELLITUS WITH STABLE PROLIFERATIVE RETINOPATHY OF BOTH EYES, WITHOUT LONG-TERM CURRENT USE OF INSULIN: ICD-10-CM

## 2022-09-07 DIAGNOSIS — D64.9 NORMOCYTIC ANEMIA: ICD-10-CM

## 2022-09-07 DIAGNOSIS — E11.22 TYPE 2 DIABETES MELLITUS WITH STAGE 4 CHRONIC KIDNEY DISEASE, WITHOUT LONG-TERM CURRENT USE OF INSULIN: ICD-10-CM

## 2022-09-07 DIAGNOSIS — I15.0 RENOVASCULAR HYPERTENSION: ICD-10-CM

## 2022-09-07 DIAGNOSIS — N18.4 TYPE 2 DIABETES MELLITUS WITH STAGE 4 CHRONIC KIDNEY DISEASE, WITHOUT LONG-TERM CURRENT USE OF INSULIN: ICD-10-CM

## 2022-09-07 DIAGNOSIS — I50.32 CHRONIC DIASTOLIC HEART FAILURE: Primary | ICD-10-CM

## 2022-09-07 DIAGNOSIS — F41.1 GENERALIZED ANXIETY DISORDER: ICD-10-CM

## 2022-09-07 DIAGNOSIS — E89.0 POSTOPERATIVE HYPOTHYROIDISM: ICD-10-CM

## 2022-09-07 DIAGNOSIS — Z78.0 POSTMENOPAUSAL: ICD-10-CM

## 2022-09-07 DIAGNOSIS — R80.9 NEPHROTIC RANGE PROTEINURIA: ICD-10-CM

## 2022-09-07 DIAGNOSIS — E55.9 VITAMIN D DEFICIENCY: ICD-10-CM

## 2022-09-07 DIAGNOSIS — D68.1 FACTOR XI DEFICIENCY: ICD-10-CM

## 2022-09-07 PROBLEM — N18.32 TYPE 2 DIABETES MELLITUS WITH STAGE 3B CHRONIC KIDNEY DISEASE, WITHOUT LONG-TERM CURRENT USE OF INSULIN: Status: ACTIVE | Noted: 2022-09-07

## 2022-09-07 LAB
ALBUMIN SERPL BCP-MCNC: 3.5 G/DL (ref 3.5–5.2)
ALP SERPL-CCNC: 106 U/L (ref 55–135)
ALT SERPL W/O P-5'-P-CCNC: 14 U/L (ref 10–44)
ANION GAP SERPL CALC-SCNC: 9 MMOL/L (ref 8–16)
AST SERPL-CCNC: 17 U/L (ref 10–40)
BASOPHILS # BLD AUTO: 0.06 K/UL (ref 0–0.2)
BASOPHILS NFR BLD: 0.5 % (ref 0–1.9)
BILIRUB SERPL-MCNC: 0.6 MG/DL (ref 0.1–1)
BUN SERPL-MCNC: 29 MG/DL (ref 6–20)
CALCIUM SERPL-MCNC: 9 MG/DL (ref 8.7–10.5)
CHLORIDE SERPL-SCNC: 110 MMOL/L (ref 95–110)
CHOLEST SERPL-MCNC: 192 MG/DL (ref 120–199)
CHOLEST/HDLC SERPL: 3 {RATIO} (ref 2–5)
CK SERPL-CCNC: 68 U/L (ref 20–180)
CO2 SERPL-SCNC: 23 MMOL/L (ref 23–29)
CREAT SERPL-MCNC: 3.5 MG/DL (ref 0.5–1.4)
DIFFERENTIAL METHOD: ABNORMAL
EOSINOPHIL # BLD AUTO: 0.2 K/UL (ref 0–0.5)
EOSINOPHIL NFR BLD: 1.7 % (ref 0–8)
ERYTHROCYTE [DISTWIDTH] IN BLOOD BY AUTOMATED COUNT: 14.8 % (ref 11.5–14.5)
EST. GFR  (NO RACE VARIABLE): 14.3 ML/MIN/1.73 M^2
ESTIMATED AVG GLUCOSE: 148 MG/DL (ref 68–131)
GLUCOSE SERPL-MCNC: 184 MG/DL (ref 70–110)
HBA1C MFR BLD: 6.8 % (ref 4–5.6)
HCT VFR BLD AUTO: 32.4 % (ref 37–48.5)
HDLC SERPL-MCNC: 65 MG/DL (ref 40–75)
HDLC SERPL: 33.9 % (ref 20–50)
HGB BLD-MCNC: 10.6 G/DL (ref 12–16)
IMM GRANULOCYTES # BLD AUTO: 0.06 K/UL (ref 0–0.04)
IMM GRANULOCYTES NFR BLD AUTO: 0.5 % (ref 0–0.5)
LDLC SERPL CALC-MCNC: 103 MG/DL (ref 63–159)
LYMPHOCYTES # BLD AUTO: 2.4 K/UL (ref 1–4.8)
LYMPHOCYTES NFR BLD: 19 % (ref 18–48)
MCH RBC QN AUTO: 27.9 PG (ref 27–31)
MCHC RBC AUTO-ENTMCNC: 32.7 G/DL (ref 32–36)
MCV RBC AUTO: 85 FL (ref 82–98)
MONOCYTES # BLD AUTO: 0.7 K/UL (ref 0.3–1)
MONOCYTES NFR BLD: 5.7 % (ref 4–15)
NEUTROPHILS # BLD AUTO: 9.2 K/UL (ref 1.8–7.7)
NEUTROPHILS NFR BLD: 72.6 % (ref 38–73)
NONHDLC SERPL-MCNC: 127 MG/DL
NRBC BLD-RTO: 0 /100 WBC
PLATELET # BLD AUTO: 153 K/UL (ref 150–450)
PMV BLD AUTO: 11.1 FL (ref 9.2–12.9)
POTASSIUM SERPL-SCNC: 5 MMOL/L (ref 3.5–5.1)
PROT SERPL-MCNC: 7.4 G/DL (ref 6–8.4)
RBC # BLD AUTO: 3.8 M/UL (ref 4–5.4)
SODIUM SERPL-SCNC: 142 MMOL/L (ref 136–145)
T4 FREE SERPL-MCNC: 0.59 NG/DL (ref 0.71–1.51)
TRIGL SERPL-MCNC: 120 MG/DL (ref 30–150)
TSH SERPL DL<=0.005 MIU/L-ACNC: 107.62 UIU/ML (ref 0.4–4)
WBC # BLD AUTO: 12.7 K/UL (ref 3.9–12.7)

## 2022-09-07 PROCEDURE — 3080F DIAST BP >= 90 MM HG: CPT | Mod: HCNC,CPTII,S$GLB, | Performed by: INTERNAL MEDICINE

## 2022-09-07 PROCEDURE — 3008F BODY MASS INDEX DOCD: CPT | Mod: HCNC,CPTII,S$GLB, | Performed by: INTERNAL MEDICINE

## 2022-09-07 PROCEDURE — 3077F PR MOST RECENT SYSTOLIC BLOOD PRESSURE >= 140 MM HG: ICD-10-PCS | Mod: HCNC,CPTII,S$GLB, | Performed by: INTERNAL MEDICINE

## 2022-09-07 PROCEDURE — 3080F PR MOST RECENT DIASTOLIC BLOOD PRESSURE >= 90 MM HG: ICD-10-PCS | Mod: HCNC,CPTII,S$GLB, | Performed by: INTERNAL MEDICINE

## 2022-09-07 PROCEDURE — 99999 PR PBB SHADOW E&M-EST. PATIENT-LVL IV: CPT | Mod: PBBFAC,HCNC,, | Performed by: INTERNAL MEDICINE

## 2022-09-07 PROCEDURE — 84443 ASSAY THYROID STIM HORMONE: CPT | Mod: HCNC | Performed by: INTERNAL MEDICINE

## 2022-09-07 PROCEDURE — 82550 ASSAY OF CK (CPK): CPT | Mod: HCNC | Performed by: INTERNAL MEDICINE

## 2022-09-07 PROCEDURE — 99999 PR PBB SHADOW E&M-EST. PATIENT-LVL IV: ICD-10-PCS | Mod: PBBFAC,HCNC,, | Performed by: INTERNAL MEDICINE

## 2022-09-07 PROCEDURE — 80053 COMPREHEN METABOLIC PANEL: CPT | Mod: HCNC | Performed by: INTERNAL MEDICINE

## 2022-09-07 PROCEDURE — 36415 COLL VENOUS BLD VENIPUNCTURE: CPT | Mod: HCNC | Performed by: INTERNAL MEDICINE

## 2022-09-07 PROCEDURE — 80061 LIPID PANEL: CPT | Mod: HCNC | Performed by: INTERNAL MEDICINE

## 2022-09-07 PROCEDURE — 83036 HEMOGLOBIN GLYCOSYLATED A1C: CPT | Mod: HCNC | Performed by: INTERNAL MEDICINE

## 2022-09-07 PROCEDURE — 84439 ASSAY OF FREE THYROXINE: CPT | Mod: HCNC | Performed by: INTERNAL MEDICINE

## 2022-09-07 PROCEDURE — 3077F SYST BP >= 140 MM HG: CPT | Mod: HCNC,CPTII,S$GLB, | Performed by: INTERNAL MEDICINE

## 2022-09-07 PROCEDURE — 99214 PR OFFICE/OUTPT VISIT, EST, LEVL IV, 30-39 MIN: ICD-10-PCS | Mod: HCNC,S$GLB,, | Performed by: INTERNAL MEDICINE

## 2022-09-07 PROCEDURE — 3008F PR BODY MASS INDEX (BMI) DOCUMENTED: ICD-10-PCS | Mod: HCNC,CPTII,S$GLB, | Performed by: INTERNAL MEDICINE

## 2022-09-07 PROCEDURE — 1159F MED LIST DOCD IN RCRD: CPT | Mod: HCNC,CPTII,S$GLB, | Performed by: INTERNAL MEDICINE

## 2022-09-07 PROCEDURE — 99214 OFFICE O/P EST MOD 30 MIN: CPT | Mod: HCNC,S$GLB,, | Performed by: INTERNAL MEDICINE

## 2022-09-07 PROCEDURE — 1159F PR MEDICATION LIST DOCUMENTED IN MEDICAL RECORD: ICD-10-PCS | Mod: HCNC,CPTII,S$GLB, | Performed by: INTERNAL MEDICINE

## 2022-09-07 PROCEDURE — 85025 COMPLETE CBC W/AUTO DIFF WBC: CPT | Mod: HCNC | Performed by: INTERNAL MEDICINE

## 2022-09-07 NOTE — PROGRESS NOTES
INTERNAL MEDICINE CLINIC  Follow-up Visit Progress Note     PRESENTING HISTORY     PCP: Elan Price MD    Last Clinic Visit with me:  6-8-2022    Current Chief Complaint/Problem:    Chief Complaint   Patient presents with    Follow-up     History of Present Illness & ROS: Ms. Kathie Quezada is a 62 y.o. female.    Has right flank pain on and off.  She stopped many meds.    Reported high BP at home since stopping some meds.  Also weight gain. Only took Lasix once a week instead of daily.    PAST HISTORY:     Past Medical History:   Diagnosis Date    Acute respiratory failure with hypoxia and hypercapnia 5/26/2019    ATN (acute tubular necrosis) 2/8/2019    Chronic diastolic heart failure 2/7/2019 2-8-19 Mild left atrial enlargement. Mild left ventricular enlargement. Normal left ventricular systolic function. The estimated ejection fraction is 55% Indeterminate left ventricular diastolic function. Normal right ventricular systolic function. Mild mitral regurgitation. Mild to moderate tricuspid regurgitation. The estimated PA systolic pressure is 32 mm Hg Normal central venous pressure (3 m    CKD (chronic kidney disease) stage 4, GFR 15-29 ml/min 5/16/2018    CKD stage G3a/A3, GFR 45-59 and albumin creatinine ratio >300 mg/g 5/16/2018    CKD stage G3b/A3, GFR 30-44 and albumin creatinine ratio >300 mg/g 5/16/2018    Closed compression fracture of L2 lumbar vertebra 5/24/2019    Closed compression fracture of L2 lumbar vertebra 5/24/2019     IMO Regulatory Load October 2019    Controlled type 2 diabetes with retinopathy 4/9/2018    COVID-19 virus infection 7-1-2021 7/1/2021    CVA (cerebral vascular accident) 2017    Diabetic polyneuropathy associated with type 2 diabetes mellitus 12/4/2020    Essential hypertension 4/9/2018    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  7-31-19 Factor XI Activity 55 - 145 % 96       Gastroesophageal reflux disease without esophagitis 5/24/2019     Generalized anxiety disorder 11/8/2019    GERD (gastroesophageal reflux disease)     GIB (gastrointestinal bleeding) 10/18/2018    History of hepatitis C     s/p succesful Rx w/ Harv[facility]  - SVR12 (cure) 2016    Hypertensive retinopathy, bilateral 12/10/2019    Mild nonproliferative diabetic retinopathy 05/02/2018    Mixed hyperlipidemia 4/9/2018    Myalgia due to statin 9/7/2022    Normocytic anemia 1/14/2022    NS (nuclear sclerosis), bilateral 12/10/2019    PNA (pneumonia) 5/28/2019    Postoperative hypothyroidism     Renovascular hypertension 4/9/2018    S/P kyphoplasty 8/14/2019 8/14/19: L2 kyphoplasty with Dr. Kwon     Slow transit constipation 5/27/2019    Type 2 diabetes mellitus with circulatory disorder, with long-term current use of insulin 4/9/2018    Type 2 diabetes mellitus with stage 3 chronic kidney disease, without long-term current use of insulin 4/9/2018    Type 2 diabetes mellitus with stage 3a chronic kidney disease, without long-term current use of insulin 3/3/2021    Type 2 diabetes mellitus with stage 3b chronic kidney disease, without long-term current use of insulin 9/7/2022       Past Surgical History:   Procedure Laterality Date    BACK SURGERY  2017    CHOLECYSTECTOMY  2015    [facility]    COLONOSCOPY  11/13/2015    Dr Chino torres prep. internal hemorrhoids. diverticulosis of sigmois. NO polyps. repeat due 2025    FIXATION KYPHOPLASTY Bilateral 08/14/2019    Procedure: L2 kyphoplasty Fluoro Globus;  Surgeon: Jeremie Kwon DO;  Location: [facility];  Service: Neurosurgery;  Laterality: Bilateral;  GLOBUS MACRINA WINSLOW [phone] TEXTED HER @ 10:54AM ON 7-30-19  PRE-OP-BY RN  8-7-2019    HYSTERECTOMY      OOPHORECTOMY  1996    one    THYROIDECTOMY  2016    at [facility] for thyroid nodule    TONSILLECTOMY         Family History   Problem Relation Age of Onset    Diabetes type II Mother        Social History     Socioeconomic History    Marital status:    Tobacco Use    Smoking status:  Never    Smokeless tobacco: Never   Substance and Sexual Activity    Alcohol use: Yes     Alcohol/week: 4.0 standard drinks     Types: 4 Cans of beer per week     Comment: weekly    Drug use: No   Social History Narrative    Pt enjoys cooking and baking and making Flor[employer] and [employer]      Social Determinants of Health     Financial Resource Strain: Low Risk     Difficulty of Paying Living Expenses: Not very hard   Food Insecurity: No Food Insecurity    Worried About Running Out of Food in the Last Year: Never true    Ran Out of Food in the Last Year: Never true   Transportation Needs: No Transportation Needs    Lack of Transportation (Medical): No    Lack of Transportation (Non-Medical): No   Physical Activity: Unknown    Days of Exercise per Week: Patient refused    Minutes of Exercise per Session: 30 min   Stress: No Stress Concern Present    Feeling of Stress : Only a little   Social Connections: Unknown    Frequency of Communication with Friends and Family: More than three times a week    Frequency of Social Gatherings with Friends and Family: More than three times a week    Active Member of Clubs or Organizations: Yes    Attends Club or Organization Meetings: Patient refused    Marital Status:    Housing Stability: Low Risk     Unable to Pay for Housing in the Last Year: No    Number of Places Lived in the Last Year: 1    Unstable Housing in the Last Year: No       MEDICATIONS & ALLERGIES:     Current Outpatient Medications on File Prior to Visit   Medication Sig Dispense Refill    amLODIPine (NORVASC) 10 MG tablet Take 1 tablet (10 mg total) by mouth once daily. 90 tablet 3    carvediloL (COREG) 25 MG tablet Take 1 tablet (25 mg total) by mouth 2 (two) times daily with meals. 180 tablet 3    cholecalciferol, vitamin D3, 125 mcg (5,000 unit) Tab Take 1 tablet (5,000 Units total) by mouth once daily. 90 tablet 3    furosemide (LASIX) 20 MG tablet Take 1 tablet (20 mg total) by mouth once  daily. 90 tablet 3    ondansetron (ZOFRAN-ODT) 4 MG TbDL Take 1 tablet (4 mg total) by mouth every 8 (eight) hours as needed (nausea). 90 tablet 2    peg 400/hypromellose/glycerin (VISINE DRY EYE RELIEF OPHT) Apply 1 drop to eye 2 (two) times daily as needed (BOTH EYES).      traZODone (DESYREL) 50 MG tablet Take 1 tablet (50 mg total) by mouth nightly as needed for Insomnia. 90 tablet 3    [DISCONTINUED] atorvastatin (LIPITOR) 10 MG tablet Take 1 tablet (10 mg total) by mouth once daily. 90 tablet 3           EScitalopram oxalate (LEXAPRO) 10 MG tablet Take 1 tablet (10 mg total) by mouth once daily. (Patient not taking: Reported on 9/7/2022) 90 tablet 3    SYNTHROID 200 mcg tablet Take 1 tablet (200 mcg total) by mouth before breakfast. (Patient not taking: Reported on 9/7/2022) 90 tablet 3     No current facility-administered medications on file prior to visit.        Review of patient's allergies indicates:   Allergen Reactions    Mushroom Hives    Bactrim [sulfamethoxazole-trimethoprim] Hives    Codeine Hives    Gabapentin      Pruritis     Iodine     Peanut butter flavor     Penicillins Hives    Shellfish containing products Itching    Sulfa (sulfonamide antibiotics) Hives       Medications Reconciliation:   I have reconciled the patient's home medications and discharge medications with the patient/family. I have updated all changes.  Refer to After-Visit Medication List.    OBJECTIVE:     Vital Signs:  Vitals:    09/07/22 1005   BP: (!) 148/90   Pulse: 81     Wt Readings from Last 3 Encounters:   09/07/22 1005 119.3 kg (263 lb 0.1 oz)   07/30/22 1315 113.4 kg (250 lb)   06/09/22 0941 117.2 kg (258 lb 6.1 oz)     Body mass index is 38.84 kg/m².     Physical Exam:  General: Well developed, well nourished. No distress.  HEENT: Head is normocephalic, atraumatic;   Eyes: Clear conjunctiva.  Neck: Supple, symmetrical neck; trachea midline.  Lungs: Clear to auscultation bilaterally and normal respiratory  effort.  Cardiovascular: Heart with regular rate and rhythm.    Extremities: Trace LE edema.    Abdomen: Abdomen is soft, non-tender non-distended with normal bowel sounds.  Skin: Skin color, texture, turgor normal. No rashes.  Musculoskeletal: Normal gait.   Psychiatric: Normal affect. Alert.      Laboratory  Lab Results   Component Value Date    WBC 12.70 09/07/2022    HGB 10.6 (L) 09/07/2022    HCT 32.4 (L) 09/07/2022     09/07/2022    CHOL 192 09/07/2022    TRIG 120 09/07/2022    HDL 65 09/07/2022    ALT 14 09/07/2022    AST 17 09/07/2022     09/07/2022    K 5.0 09/07/2022     09/07/2022    CREATININE 3.5 (H) 09/07/2022    BUN 29 (H) 09/07/2022    CO2 23 09/07/2022    .620 (H) 09/07/2022    INR 1.0 04/13/2021    HGBA1C 6.8 (H) 09/07/2022       ASSESSMENT & PLAN:     Chronic diastolic heart failure  CKD stage 4  Nephrotic range proteinuria  Renovascular hypertension  - Compensated CHF.    Could not get DIgital HTN to work for her.       BP high due to not taking the BP meds daily. Discussed importance of meds.       US 11-19-21  Preserved right and left cortical thickness, echogenicity, and corticomedullary differentiation.  No right or left renal masses, stones, or hydronephrosis.  Right renal resistive index 0.83.  Left renal resistive index 0.82.  Right kidney measures 10.7 cm.  Left kidney measures 10.9 cm.  Given level of bladder distention with urine, no intraluminal masses. Unremarkable renal ultrasound.       CHF/HTN Regimen:   -     amLODIPine (NORVASC) 10 MG tablet; Take 1 tablet (10 mg total) by mouth once daily.    -     carvediloL (COREG) 25 MG tablet; Take 1 tablet (25 mg total) by mouth 2 (two) times daily   -     furosemide (LASIX) 20 MG tablet; Take 1 tablet (20 mg total) by mouth once daily.       Labs today:  -     HEMOGLOBIN A1C; Future; Expected date: 09/07/2022  -     Lipid Panel; Future; Expected date: 09/07/2022  -     Comprehensive Metabolic Panel; Future;  Expected date: 09/07/2022  -     CBC Auto Differential; Future; Expected date: 09/07/2022  -     CK; Future; Expected date: 09/07/2022      Nephrology consult.    Type 2 diabetes mellitus with stage 3 chronic kidney disease, without long-term current use of insulin  Diabetic Neuropathy and retinopathy  - Diet controlled. A1c 6.2     Mixed hyperlipidemia  Statin Induced myopathy.  - LDL goal < 100    Last LDL 88 ()     - Probable statin induced myopathy.     Vitamin D deficiency  - Change from weekly to daily Vitamin D3 5000 IU daily OTC.   -     cholecalciferol, vitamin D3, 125 mcg (5,000 unit) Tab; Take 1 tablet (5,000 Units total) by mouth once daily.     Postoperative hypothyroidism  -     SYNTHROID 200 mcg tablet; Take 1 tablet (200 mcg total) by mouth before breakfast.     High TSH due to patient not taking med.        Dicussed with the patient.     Normocytic anemia  -   Improved. Normal B12 and iron.     Generalized anxiety disorder  -     EScitalopram oxalate (LEXAPRO) 10 MG tablet; Take 1 tablet (10 mg total) by mouth once daily.   -     traZODone (DESYREL) 50 MG tablet; Take 1 tablet (50 mg total) by mouth nightly as needed for Insomnia.        Factor XI deficiency  - Usually get  FFP 2 units right before surgery for Factor XI deficiency     Class 2 severe obesity due to excess calories with serious comorbidity and body mass index (BMI) of 35.0 to 35.9 in adult  Body mass index is 36.31 kg/m².        Preventive Health Maintenance:  Up to date.     Postmenopausal  -     DXA Bone Density Spine And Hip; Future; Expected date: 09/07/2022    Return to Clinic for Follow Up with me:  Sep 19.    Scheduled Follow-up :  Future Appointments   Date Time Provider Department Center   9/19/2022  9:30 AM Elan Price MD [facility]   10/11/2022  9:20 AM [facility]1 [facility]         After Visit Medication List :     Medication List            Accurate as of September 7, 2022  4:04 PM. If you have  any questions, ask your nurse or doctor.                CONTINUE taking these medications      amLODIPine 10 MG tablet  Commonly known as: NORVASC  Take 1 tablet (10 mg total) by mouth once daily.     carvediloL 25 MG tablet  Commonly known as: COREG  Take 1 tablet (25 mg total) by mouth 2 (two) times daily with meals.     cholecalciferol (vitamin D3) 125 mcg (5,000 unit) Tab  Take 1 tablet (5,000 Units total) by mouth once daily.     EScitalopram oxalate 10 MG tablet  Commonly known as: LEXAPRO  Take 1 tablet (10 mg total) by mouth once daily.     furosemide 20 MG tablet  Commonly known as: LASIX  Take 1 tablet (20 mg total) by mouth once daily.     ondansetron 4 MG Tbdl  Commonly known as: ZOFRAN-ODT  Take 1 tablet (4 mg total) by mouth every 8 (eight) hours as needed (nausea).     SYNTHROID 200 MCG tablet  Generic drug: levothyroxine  Take 1 tablet (200 mcg total) by mouth before breakfast.     traZODone 50 MG tablet  Commonly known as: DESYREL  Take 1 tablet (50 mg total) by mouth nightly as needed for Insomnia.     VISINE DRY EYE RELIEF OPHT            STOP taking these medications      atorvastatin 10 MG tablet  Commonly known as: LIPITOR  Stopped by: Elan Price MD     tiZANidine 4 MG tablet  Commonly known as: ZANAFLEX  Stopped by: Elan Price MD              Signing Physician:  Elan Price MD

## 2022-09-09 ENCOUNTER — PATIENT OUTREACH (OUTPATIENT)
Dept: ADMINISTRATIVE | Facility: HOSPITAL | Age: 62
End: 2022-09-09
Payer: MEDICARE

## 2022-09-11 ENCOUNTER — NURSE TRIAGE (OUTPATIENT)
Dept: ADMINISTRATIVE | Facility: CLINIC | Age: 62
End: 2022-09-11
Payer: MEDICARE

## 2022-09-11 ENCOUNTER — HOSPITAL ENCOUNTER (OUTPATIENT)
Facility: HOSPITAL | Age: 62
Discharge: HOME | End: 2022-09-13
Attending: EMERGENCY MEDICINE | Admitting: HOSPITALIST
Payer: MEDICARE

## 2022-09-11 DIAGNOSIS — R29.898 WEAKNESS OF LEFT LOWER EXTREMITY: Primary | ICD-10-CM

## 2022-09-11 DIAGNOSIS — E89.0 POSTOPERATIVE HYPOTHYROIDISM: ICD-10-CM

## 2022-09-11 DIAGNOSIS — F41.1 GENERALIZED ANXIETY DISORDER: ICD-10-CM

## 2022-09-11 DIAGNOSIS — I63.9 STROKE: ICD-10-CM

## 2022-09-11 DIAGNOSIS — I15.0 RENOVASCULAR HYPERTENSION: ICD-10-CM

## 2022-09-11 DIAGNOSIS — E55.9 VITAMIN D DEFICIENCY: ICD-10-CM

## 2022-09-11 DIAGNOSIS — R07.9 CHEST PAIN: ICD-10-CM

## 2022-09-11 DIAGNOSIS — I50.32 CHRONIC DIASTOLIC HEART FAILURE: ICD-10-CM

## 2022-09-11 PROBLEM — Z86.73 HISTORY OF STROKE: Status: ACTIVE | Noted: 2022-09-11

## 2022-09-11 PROBLEM — R51.9 HEADACHE: Status: ACTIVE | Noted: 2022-09-11

## 2022-09-11 PROBLEM — R20.0 LEFT SIDED NUMBNESS: Status: ACTIVE | Noted: 2022-09-11

## 2022-09-11 LAB
ALBUMIN SERPL BCP-MCNC: 3.4 G/DL (ref 3.5–5.2)
ALP SERPL-CCNC: 114 U/L (ref 55–135)
ALT SERPL W/O P-5'-P-CCNC: 16 U/L (ref 10–44)
ANION GAP SERPL CALC-SCNC: 10 MMOL/L (ref 8–16)
AST SERPL-CCNC: 27 U/L (ref 10–40)
BILIRUB SERPL-MCNC: 0.4 MG/DL (ref 0.1–1)
BUN SERPL-MCNC: 34 MG/DL (ref 6–20)
CALCIUM SERPL-MCNC: 7.6 MG/DL (ref 8.7–10.5)
CHLORIDE SERPL-SCNC: 106 MMOL/L (ref 95–110)
CHOLEST SERPL-MCNC: 180 MG/DL (ref 120–199)
CHOLEST/HDLC SERPL: 3.1 {RATIO} (ref 2–5)
CO2 SERPL-SCNC: 18 MMOL/L (ref 23–29)
CREAT SERPL-MCNC: 3.8 MG/DL (ref 0.5–1.4)
CREAT SERPL-MCNC: 3.9 MG/DL (ref 0.5–1.4)
EST. GFR  (NO RACE VARIABLE): 13 ML/MIN/1.73 M^2
GLUCOSE SERPL-MCNC: 145 MG/DL (ref 70–110)
HDLC SERPL-MCNC: 58 MG/DL (ref 40–75)
HDLC SERPL: 32.2 % (ref 20–50)
INR PPP: 1.1 (ref 0.8–1.2)
LDLC SERPL CALC-MCNC: 93 MG/DL (ref 63–159)
NONHDLC SERPL-MCNC: 122 MG/DL
POC PTINR: 1.2 (ref 0.9–1.2)
POC PTWBT: 14.5 SEC (ref 9.7–14.3)
POTASSIUM SERPL-SCNC: 5.6 MMOL/L (ref 3.5–5.1)
PROT SERPL-MCNC: 7.2 G/DL (ref 6–8.4)
PROTHROMBIN TIME: 20.5 SEC (ref 9–12.5)
SAMPLE: ABNORMAL
SAMPLE: ABNORMAL
SODIUM SERPL-SCNC: 134 MMOL/L (ref 136–145)
T4 FREE SERPL-MCNC: 0.96 NG/DL (ref 0.71–1.51)
TRIGL SERPL-MCNC: 145 MG/DL (ref 30–150)
TSH SERPL DL<=0.005 MIU/L-ACNC: 43.21 UIU/ML (ref 0.4–4)

## 2022-09-11 PROCEDURE — 84443 ASSAY THYROID STIM HORMONE: CPT | Mod: HCNC | Performed by: EMERGENCY MEDICINE

## 2022-09-11 PROCEDURE — 85610 PROTHROMBIN TIME: CPT | Mod: HCNC | Performed by: EMERGENCY MEDICINE

## 2022-09-11 PROCEDURE — 99285 EMERGENCY DEPT VISIT HI MDM: CPT | Mod: 25,HCNC

## 2022-09-11 PROCEDURE — 99285 PR EMERGENCY DEPT VISIT,LEVEL V: ICD-10-PCS | Mod: HCNC,,, | Performed by: EMERGENCY MEDICINE

## 2022-09-11 PROCEDURE — 99285 EMERGENCY DEPT VISIT HI MDM: CPT | Mod: HCNC,,, | Performed by: EMERGENCY MEDICINE

## 2022-09-11 PROCEDURE — 93010 EKG 12-LEAD: ICD-10-PCS | Mod: HCNC,,, | Performed by: INTERNAL MEDICINE

## 2022-09-11 PROCEDURE — 93010 ELECTROCARDIOGRAM REPORT: CPT | Mod: HCNC,,, | Performed by: INTERNAL MEDICINE

## 2022-09-11 PROCEDURE — 84439 ASSAY OF FREE THYROXINE: CPT | Mod: HCNC | Performed by: EMERGENCY MEDICINE

## 2022-09-11 PROCEDURE — 80061 LIPID PANEL: CPT | Mod: HCNC | Performed by: EMERGENCY MEDICINE

## 2022-09-11 PROCEDURE — 99900035 HC TECH TIME PER 15 MIN (STAT): Mod: HCNC

## 2022-09-11 PROCEDURE — 93005 ELECTROCARDIOGRAM TRACING: CPT | Mod: HCNC

## 2022-09-11 PROCEDURE — 80053 COMPREHEN METABOLIC PANEL: CPT | Mod: HCNC | Performed by: EMERGENCY MEDICINE

## 2022-09-11 PROCEDURE — 85610 PROTHROMBIN TIME: CPT | Mod: HCNC

## 2022-09-11 PROCEDURE — 99204 PR OFFICE/OUTPT VISIT, NEW, LEVL IV, 45-59 MIN: ICD-10-PCS | Mod: HCNC,,, | Performed by: PSYCHIATRY & NEUROLOGY

## 2022-09-11 PROCEDURE — 82803 BLOOD GASES ANY COMBINATION: CPT | Mod: HCNC

## 2022-09-11 PROCEDURE — 82565 ASSAY OF CREATININE: CPT | Mod: HCNC,59

## 2022-09-11 PROCEDURE — 63600175 PHARM REV CODE 636 W HCPCS: Mod: HCNC | Performed by: EMERGENCY MEDICINE

## 2022-09-11 PROCEDURE — 99204 OFFICE O/P NEW MOD 45 MIN: CPT | Mod: HCNC,,, | Performed by: PSYCHIATRY & NEUROLOGY

## 2022-09-11 RX ORDER — METOCLOPRAMIDE 10 MG/1
10 TABLET ORAL
Status: DISCONTINUED | OUTPATIENT
Start: 2022-09-11 | End: 2022-09-11

## 2022-09-11 RX ORDER — METOCLOPRAMIDE HYDROCHLORIDE 5 MG/ML
10 INJECTION INTRAMUSCULAR; INTRAVENOUS
Status: COMPLETED | OUTPATIENT
Start: 2022-09-11 | End: 2022-09-11

## 2022-09-11 RX ORDER — ACETAMINOPHEN 500 MG
1000 TABLET ORAL
Status: DISCONTINUED | OUTPATIENT
Start: 2022-09-11 | End: 2022-09-12

## 2022-09-11 RX ADMIN — METOCLOPRAMIDE 10 MG: 5 INJECTION, SOLUTION INTRAMUSCULAR; INTRAVENOUS at 09:09

## 2022-09-11 NOTE — TELEPHONE ENCOUNTER
OOC incoming call - Pt c/o blood pressure fluctuating very high and very low per Pt, neck is tightening, no cp, HA, double vision, confused at times. HTN protocol followed and pt advised to call 911 now. Pt said she will get her daughter to bring her to the ER. Educated Pt and daughter that time is very important and it is crucial that they call EMS for immediate care on site d/t possible stroke. Daughter agrees and is able to call 911 now. Encounter routed to PCP/staff as high priority.   Reason for Disposition   Difficult to awaken or acting confused (e.g., disoriented, slurred speech)    Protocols used: Blood Pressure - High-A-AH

## 2022-09-12 LAB
ANION GAP SERPL CALC-SCNC: 9 MMOL/L (ref 8–16)
ASCENDING AORTA: 2.98 CM
AV INDEX (PROSTH): 0.8
AV MEAN GRADIENT: 3 MMHG
AV PEAK GRADIENT: 4 MMHG
AV VALVE AREA: 2.56 CM2
AV VELOCITY RATIO: 0.73
BASOPHILS # BLD AUTO: 0.04 K/UL (ref 0–0.2)
BASOPHILS NFR BLD: 0.3 % (ref 0–1.9)
BSA FOR ECHO PROCEDURE: 2.4 M2
BUN SERPL-MCNC: 33 MG/DL (ref 6–20)
CALCIUM SERPL-MCNC: 8 MG/DL (ref 8.7–10.5)
CHLORIDE SERPL-SCNC: 108 MMOL/L (ref 95–110)
CO2 SERPL-SCNC: 22 MMOL/L (ref 23–29)
CREAT SERPL-MCNC: 3.6 MG/DL (ref 0.5–1.4)
CV ECHO LV RWT: 0 CM
DIFFERENTIAL METHOD: ABNORMAL
DOP CALC AO PEAK VEL: 1.01 M/S
DOP CALC AO VTI: 21.57 CM
DOP CALC LVOT AREA: 3.2 CM2
DOP CALC LVOT DIAMETER: 2.02 CM
DOP CALC LVOT PEAK VEL: 0.74 M/S
DOP CALC LVOT STROKE VOLUME: 55.22 CM3
DOP CALCLVOT PEAK VEL VTI: 17.24 CM
ECHO LV POSTERIOR WALL: 1.1 CM (ref 0.6–1.1)
EJECTION FRACTION: 65 %
EOSINOPHIL # BLD AUTO: 0.2 K/UL (ref 0–0.5)
EOSINOPHIL NFR BLD: 1.6 % (ref 0–8)
ERYTHROCYTE [DISTWIDTH] IN BLOOD BY AUTOMATED COUNT: 14.5 % (ref 11.5–14.5)
EST. GFR  (NO RACE VARIABLE): 13.9 ML/MIN/1.73 M^2
FRACTIONAL SHORTENING: 99 % (ref 28–44)
GLUCOSE SERPL-MCNC: 163 MG/DL (ref 70–110)
HCT VFR BLD AUTO: 31.9 % (ref 37–48.5)
HGB BLD-MCNC: 10.6 G/DL (ref 12–16)
IMM GRANULOCYTES # BLD AUTO: 0.07 K/UL (ref 0–0.04)
IMM GRANULOCYTES NFR BLD AUTO: 0.6 % (ref 0–0.5)
INTERVENTRICULAR SEPTUM: 1.3 CM (ref 0.6–1.1)
LA MAJOR: 6.97 CM
LA MINOR: 7.2 CM
LA WIDTH: 4.44 CM
LEFT ATRIUM SIZE: 4.17 CM
LEFT ATRIUM VOLUME INDEX: 48.3 ML/M2
LEFT ATRIUM VOLUME: 111.47 CM3
LEFT INTERNAL DIMENSION IN SYSTOLE: 3.4 CM (ref 2.1–4)
LEFT VENTRICLE DIASTOLIC VOLUME INDEX: 44.84 ML/M2
LEFT VENTRICLE DIASTOLIC VOLUME: 103.59 ML
LEFT VENTRICLE MASS INDEX: ABNORMAL G/M2
LEFT VENTRICLE SYSTOLIC VOLUME INDEX: 20.5 ML/M2
LEFT VENTRICLE SYSTOLIC VOLUME: 47.47 ML
LEFT VENTRICULAR INTERNAL DIMENSION IN DIASTOLE: 502 CM (ref 3.5–6)
LEFT VENTRICULAR MASS: ABNORMAL G
LYMPHOCYTES # BLD AUTO: 2.4 K/UL (ref 1–4.8)
LYMPHOCYTES NFR BLD: 20.1 % (ref 18–48)
MAGNESIUM SERPL-MCNC: 1.8 MG/DL (ref 1.6–2.6)
MCH RBC QN AUTO: 28.3 PG (ref 27–31)
MCHC RBC AUTO-ENTMCNC: 33.2 G/DL (ref 32–36)
MCV RBC AUTO: 85 FL (ref 82–98)
MONOCYTES # BLD AUTO: 0.8 K/UL (ref 0.3–1)
MONOCYTES NFR BLD: 6.4 % (ref 4–15)
NEUTROPHILS # BLD AUTO: 8.4 K/UL (ref 1.8–7.7)
NEUTROPHILS NFR BLD: 71 % (ref 38–73)
NRBC BLD-RTO: 0 /100 WBC
PHOSPHATE SERPL-MCNC: 4.6 MG/DL (ref 2.7–4.5)
PLATELET # BLD AUTO: 151 K/UL (ref 150–450)
PMV BLD AUTO: 11 FL (ref 9.2–12.9)
POCT GLUCOSE: 126 MG/DL (ref 70–110)
POCT GLUCOSE: 165 MG/DL (ref 70–110)
POCT GLUCOSE: 180 MG/DL (ref 70–110)
POTASSIUM SERPL-SCNC: 4.3 MMOL/L (ref 3.5–5.1)
RA MAJOR: 6.48 CM
RA PRESSURE: 3 MMHG
RA WIDTH: 3.88 CM
RBC # BLD AUTO: 3.75 M/UL (ref 4–5.4)
RIGHT VENTRICULAR END-DIASTOLIC DIMENSION: 3.33 CM
SINUS: 2.86 CM
SODIUM SERPL-SCNC: 139 MMOL/L (ref 136–145)
STJ: 2.89 CM
TDI LATERAL: 0.08 M/S
TDI SEPTAL: 0.05 M/S
TDI: 0.07 M/S
WBC # BLD AUTO: 11.83 K/UL (ref 3.9–12.7)

## 2022-09-12 PROCEDURE — 25000003 PHARM REV CODE 250: Mod: HCNC | Performed by: PHYSICIAN ASSISTANT

## 2022-09-12 PROCEDURE — 83735 ASSAY OF MAGNESIUM: CPT | Mod: HCNC

## 2022-09-12 PROCEDURE — 99220 PR INITIAL OBSERVATION CARE,LEVL III: ICD-10-PCS | Mod: HCNC,,, | Performed by: PHYSICIAN ASSISTANT

## 2022-09-12 PROCEDURE — 99220 PR INITIAL OBSERVATION CARE,LEVL III: CPT | Mod: HCNC,,, | Performed by: PHYSICIAN ASSISTANT

## 2022-09-12 PROCEDURE — 25000003 PHARM REV CODE 250: Mod: HCNC

## 2022-09-12 PROCEDURE — 85025 COMPLETE CBC W/AUTO DIFF WBC: CPT | Mod: HCNC

## 2022-09-12 PROCEDURE — 80048 BASIC METABOLIC PNL TOTAL CA: CPT | Mod: HCNC

## 2022-09-12 PROCEDURE — 99214 OFFICE O/P EST MOD 30 MIN: CPT | Mod: HCNC,,, | Performed by: PSYCHIATRY & NEUROLOGY

## 2022-09-12 PROCEDURE — 84100 ASSAY OF PHOSPHORUS: CPT | Mod: HCNC

## 2022-09-12 PROCEDURE — G0378 HOSPITAL OBSERVATION PER HR: HCPCS | Mod: HCNC

## 2022-09-12 PROCEDURE — 36415 COLL VENOUS BLD VENIPUNCTURE: CPT | Mod: HCNC

## 2022-09-12 PROCEDURE — 99214 PR OFFICE/OUTPT VISIT, EST, LEVL IV, 30-39 MIN: ICD-10-PCS | Mod: HCNC,,, | Performed by: PSYCHIATRY & NEUROLOGY

## 2022-09-12 RX ORDER — SIMETHICONE 80 MG
1 TABLET,CHEWABLE ORAL 4 TIMES DAILY PRN
Status: DISCONTINUED | OUTPATIENT
Start: 2022-09-12 | End: 2022-09-13 | Stop reason: HOSPADM

## 2022-09-12 RX ORDER — CARVEDILOL 25 MG/1
25 TABLET ORAL 2 TIMES DAILY WITH MEALS
Status: DISCONTINUED | OUTPATIENT
Start: 2022-09-12 | End: 2022-09-13 | Stop reason: HOSPADM

## 2022-09-12 RX ORDER — IBUPROFEN 200 MG
24 TABLET ORAL
Status: DISCONTINUED | OUTPATIENT
Start: 2022-09-12 | End: 2022-09-13 | Stop reason: HOSPADM

## 2022-09-12 RX ORDER — ESCITALOPRAM OXALATE 10 MG/1
10 TABLET ORAL DAILY
Status: DISCONTINUED | OUTPATIENT
Start: 2022-09-12 | End: 2022-09-13 | Stop reason: HOSPADM

## 2022-09-12 RX ORDER — PROCHLORPERAZINE EDISYLATE 5 MG/ML
5 INJECTION INTRAMUSCULAR; INTRAVENOUS EVERY 6 HOURS PRN
Status: DISCONTINUED | OUTPATIENT
Start: 2022-09-12 | End: 2022-09-13 | Stop reason: HOSPADM

## 2022-09-12 RX ORDER — POLYETHYLENE GLYCOL 3350 17 G/17G
17 POWDER, FOR SOLUTION ORAL DAILY PRN
Status: DISCONTINUED | OUTPATIENT
Start: 2022-09-12 | End: 2022-09-13 | Stop reason: HOSPADM

## 2022-09-12 RX ORDER — ONDANSETRON 8 MG/1
8 TABLET, ORALLY DISINTEGRATING ORAL EVERY 8 HOURS PRN
Status: DISCONTINUED | OUTPATIENT
Start: 2022-09-12 | End: 2022-09-13 | Stop reason: HOSPADM

## 2022-09-12 RX ORDER — ACETAMINOPHEN 500 MG
1000 TABLET ORAL EVERY 8 HOURS PRN
Status: DISCONTINUED | OUTPATIENT
Start: 2022-09-12 | End: 2022-09-13 | Stop reason: HOSPADM

## 2022-09-12 RX ORDER — BISACODYL 10 MG
10 SUPPOSITORY, RECTAL RECTAL DAILY PRN
Status: DISCONTINUED | OUTPATIENT
Start: 2022-09-12 | End: 2022-09-13 | Stop reason: HOSPADM

## 2022-09-12 RX ORDER — FUROSEMIDE 20 MG/1
20 TABLET ORAL DAILY
Status: DISCONTINUED | OUTPATIENT
Start: 2022-09-12 | End: 2022-09-12

## 2022-09-12 RX ORDER — GLUCAGON 1 MG
1 KIT INJECTION
Status: DISCONTINUED | OUTPATIENT
Start: 2022-09-12 | End: 2022-09-13 | Stop reason: HOSPADM

## 2022-09-12 RX ORDER — LEVOTHYROXINE SODIUM 100 UG/1
200 TABLET ORAL
Status: DISCONTINUED | OUTPATIENT
Start: 2022-09-12 | End: 2022-09-13 | Stop reason: HOSPADM

## 2022-09-12 RX ORDER — CARBOXYMETHYLCELLULOSE SODIUM 10 MG/ML
1 GEL OPHTHALMIC 2 TIMES DAILY PRN
Status: DISCONTINUED | OUTPATIENT
Start: 2022-09-12 | End: 2022-09-13 | Stop reason: HOSPADM

## 2022-09-12 RX ORDER — ACETAMINOPHEN 325 MG/1
650 TABLET ORAL EVERY 4 HOURS PRN
Status: DISCONTINUED | OUTPATIENT
Start: 2022-09-12 | End: 2022-09-13 | Stop reason: HOSPADM

## 2022-09-12 RX ORDER — TALC
6 POWDER (GRAM) TOPICAL NIGHTLY PRN
Status: DISCONTINUED | OUTPATIENT
Start: 2022-09-12 | End: 2022-09-13 | Stop reason: HOSPADM

## 2022-09-12 RX ORDER — HEPARIN SODIUM 5000 [USP'U]/ML
5000 INJECTION, SOLUTION INTRAVENOUS; SUBCUTANEOUS EVERY 8 HOURS
Status: DISCONTINUED | OUTPATIENT
Start: 2022-09-12 | End: 2022-09-13 | Stop reason: HOSPADM

## 2022-09-12 RX ORDER — TRAZODONE HYDROCHLORIDE 50 MG/1
50 TABLET ORAL NIGHTLY PRN
Status: DISCONTINUED | OUTPATIENT
Start: 2022-09-12 | End: 2022-09-13 | Stop reason: HOSPADM

## 2022-09-12 RX ORDER — ACETAMINOPHEN 500 MG
5000 TABLET ORAL DAILY
Status: DISCONTINUED | OUTPATIENT
Start: 2022-09-12 | End: 2022-09-13 | Stop reason: HOSPADM

## 2022-09-12 RX ORDER — SODIUM CHLORIDE 0.9 % (FLUSH) 0.9 %
5 SYRINGE (ML) INJECTION
Status: DISCONTINUED | OUTPATIENT
Start: 2022-09-12 | End: 2022-09-13 | Stop reason: HOSPADM

## 2022-09-12 RX ORDER — IPRATROPIUM BROMIDE AND ALBUTEROL SULFATE 2.5; .5 MG/3ML; MG/3ML
3 SOLUTION RESPIRATORY (INHALATION) EVERY 4 HOURS PRN
Status: DISCONTINUED | OUTPATIENT
Start: 2022-09-12 | End: 2022-09-13 | Stop reason: HOSPADM

## 2022-09-12 RX ORDER — IBUPROFEN 200 MG
16 TABLET ORAL
Status: DISCONTINUED | OUTPATIENT
Start: 2022-09-12 | End: 2022-09-13 | Stop reason: HOSPADM

## 2022-09-12 RX ORDER — CARVEDILOL 25 MG/1
25 TABLET ORAL 2 TIMES DAILY WITH MEALS
Status: DISCONTINUED | OUTPATIENT
Start: 2022-09-12 | End: 2022-09-12

## 2022-09-12 RX ORDER — NALOXONE HCL 0.4 MG/ML
0.02 VIAL (ML) INJECTION
Status: DISCONTINUED | OUTPATIENT
Start: 2022-09-12 | End: 2022-09-13 | Stop reason: HOSPADM

## 2022-09-12 RX ORDER — HYDRALAZINE HYDROCHLORIDE 25 MG/1
25 TABLET, FILM COATED ORAL EVERY 8 HOURS PRN
Status: DISCONTINUED | OUTPATIENT
Start: 2022-09-12 | End: 2022-09-13 | Stop reason: HOSPADM

## 2022-09-12 RX ORDER — AMLODIPINE BESYLATE 10 MG/1
10 TABLET ORAL DAILY
Status: DISCONTINUED | OUTPATIENT
Start: 2022-09-12 | End: 2022-09-13

## 2022-09-12 RX ORDER — MAG HYDROX/ALUMINUM HYD/SIMETH 200-200-20
30 SUSPENSION, ORAL (FINAL DOSE FORM) ORAL 4 TIMES DAILY PRN
Status: DISCONTINUED | OUTPATIENT
Start: 2022-09-12 | End: 2022-09-13 | Stop reason: HOSPADM

## 2022-09-12 RX ADMIN — ESCITALOPRAM OXALATE 10 MG: 10 TABLET ORAL at 08:09

## 2022-09-12 RX ADMIN — ACETAMINOPHEN 650 MG: 325 TABLET ORAL at 01:09

## 2022-09-12 RX ADMIN — LEVOTHYROXINE SODIUM 200 MCG: 100 TABLET ORAL at 06:09

## 2022-09-12 RX ADMIN — CARVEDILOL 25 MG: 25 TABLET, FILM COATED ORAL at 05:09

## 2022-09-12 RX ADMIN — AMLODIPINE BESYLATE 10 MG: 10 TABLET ORAL at 08:09

## 2022-09-12 RX ADMIN — Medication 5000 UNITS: at 08:09

## 2022-09-12 RX ADMIN — TRAZODONE HYDROCHLORIDE 50 MG: 50 TABLET ORAL at 08:09

## 2022-09-12 RX ADMIN — FUROSEMIDE 20 MG: 20 TABLET ORAL at 08:09

## 2022-09-12 RX ADMIN — CARVEDILOL 25 MG: 25 TABLET, FILM COATED ORAL at 04:09

## 2022-09-12 NOTE — ASSESSMENT & PLAN NOTE
Stroke in 2017   No additional info per chart review   Patient reports HA and dizziness and weakness with this stroke   Denies residual deficits   Reports not on AP/AC due to Factor XI deficiency

## 2022-09-12 NOTE — ASSESSMENT & PLAN NOTE
GFR 14 on 9/7/22   Contraindication for CTA in this lower acuity setting, will obtain MRA instead   Not on dialysis

## 2022-09-12 NOTE — ED PROVIDER NOTES
Encounter Date: 9/11/2022       History     Chief Complaint   Patient presents with    Headache     Pt c/o dull HA that radiates down to L side of neck and hypertension. Hx of previous stroke      62-year-old female history of CVA, hypertension, hyperlipidemia, diabetes, chronic kidney disease.  Patient will get 5:00 a.m. this morning and noted that she had some left-sided head and face pain.  She felt some paresthesias on the left side of her neck and her left upper extremity.  She has pain when she rotates her neck to the left.  She reports that her left side feels weaker than her right side.  She does not believe she has residual deficits from her prior CVA    Review of patient's allergies indicates:   Allergen Reactions    Mushroom Hives    Bactrim [sulfamethoxazole-trimethoprim] Hives    Codeine Hives    Gabapentin      Pruritis     Iodine     Peanut butter flavor     Penicillins Hives    Shellfish containing products Itching    Sulfa (sulfonamide antibiotics) Hives     Past Medical History:   Diagnosis Date    Acute respiratory failure with hypoxia and hypercapnia 5/26/2019    ATN (acute tubular necrosis) 2/8/2019    Chronic diastolic heart failure 2/7/2019 2-8-19 Mild left atrial enlargement. Mild left ventricular enlargement. Normal left ventricular systolic function. The estimated ejection fraction is 55% Indeterminate left ventricular diastolic function. Normal right ventricular systolic function. Mild mitral regurgitation. Mild to moderate tricuspid regurgitation. The estimated PA systolic pressure is 32 mm Hg Normal central venous pressure (3 m    CKD (chronic kidney disease) stage 4, GFR 15-29 ml/min 5/16/2018    CKD stage G3a/A3, GFR 45-59 and albumin creatinine ratio >300 mg/g 5/16/2018    CKD stage G3b/A3, GFR 30-44 and albumin creatinine ratio >300 mg/g 5/16/2018    Closed compression fracture of L2 lumbar vertebra 5/24/2019    Closed compression fracture of L2 lumbar vertebra 5/24/2019     IMO  Regulatory Load October 2019    Controlled type 2 diabetes with retinopathy 4/9/2018    COVID-19 virus infection 7-1-2021 7/1/2021    CVA (cerebral vascular accident) 2017    Diabetic polyneuropathy associated with type 2 diabetes mellitus 12/4/2020    Essential hypertension 4/9/2018    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  7-31-19 Factor XI Activity 55 - 145 % 96       Gastroesophageal reflux disease without esophagitis 5/24/2019    Generalized anxiety disorder 11/8/2019    GERD (gastroesophageal reflux disease)     GIB (gastrointestinal bleeding) 10/18/2018    History of hepatitis C     s/p succesful Rx w/ [facility]  - [id number] (cure) 2016    Hypertensive retinopathy, bilateral 12/10/2019    Mild nonproliferative diabetic retinopathy 05/02/2018    Mixed hyperlipidemia 4/9/2018    Myalgia due to statin 9/7/2022    Normocytic anemia 1/14/2022    NS (nuclear sclerosis), bilateral 12/10/2019    PNA (pneumonia) 5/28/2019    Postoperative hypothyroidism     Renovascular hypertension 4/9/2018    S/P kyphoplasty 8/14/2019 8/14/19: L2 kyphoplasty with Dr. Kwon     Slow transit constipation 5/27/2019    Type 2 diabetes mellitus with circulatory disorder, with long-term current use of insulin 4/9/2018    Type 2 diabetes mellitus with stage 3 chronic kidney disease, without long-term current use of insulin 4/9/2018    Type 2 diabetes mellitus with stage 3a chronic kidney disease, without long-term current use of insulin 3/3/2021    Type 2 diabetes mellitus with stage 3b chronic kidney disease, without long-term current use of insulin 9/7/2022     Past Surgical History:   Procedure Laterality Date    BACK SURGERY  2017    CHOLECYSTECTOMY  2015    [facility]    COLONOSCOPY  11/13/2015    Dr Chino torres prep. internal hemorrhoids. diverticulosis of sigmois. NO polyps. repeat due 2025    FIXATION KYPHOPLASTY Bilateral 08/14/2019    Procedure: L2 kyphoplasty Fluoro Globus;  Surgeon: Jeremie Kwon DO;   Location: [facility] OR;  Service: Neurosurgery;  Laterality: Bilateral;  CROW [place] [id number] TEXTED HER @ 10:54AM ON 7-30-19  PRE-OP-BY RN  8-7-2019    HYSTERECTOMY      OOPHORECTOMY  1996    one    THYROIDECTOMY  2016    at [facility] for thyroid nodule    TONSILLECTOMY       Family History   Problem Relation Age of Onset    Diabetes type II Mother      Social History     Tobacco Use    Smoking status: Never    Smokeless tobacco: Never   Substance Use Topics    Alcohol use: Yes     Alcohol/week: 4.0 standard drinks     Types: 4 Cans of beer per week     Comment: weekly    Drug use: No     Review of Systems   Constitutional:  Negative for chills and fever.   HENT:  Negative for ear pain, facial swelling, sinus pressure and sinus pain.    Eyes:  Negative for photophobia, pain and visual disturbance.   Respiratory:  Negative for cough and shortness of breath.    Cardiovascular:  Negative for chest pain and palpitations.   Gastrointestinal:  Negative for nausea and vomiting.   Endocrine: Negative for polydipsia and polyuria.   Genitourinary:  Negative for difficulty urinating, dyspareunia and dysuria.   Musculoskeletal:  Positive for neck pain. Negative for arthralgias, back pain, gait problem and neck stiffness.   Neurological:  Positive for weakness, numbness and headaches. Negative for dizziness, tremors, seizures, syncope, facial asymmetry, speech difficulty and light-headedness.   All other systems reviewed and are negative.    Physical Exam     Initial Vitals [09/11/22 1917]   BP Pulse Resp Temp SpO2   (!) 185/98 80 16 98.6 °F (37 °C) 100 %      MAP       --         Physical Exam  General: AO x 3, NAD. Well nourished. Well Developed  Head: Normocephalic and Atraumatic  HEENT: PERRLA. EOMI. OP Clear  Neck: Supple, Nontender in midline. No bruit  Cardiovascular: RRR. No m/r/g. 2+ Distal Pulses  Pulm/Chest: Chest nontender. Lungs clear to auscultation. No respiratory distress.  Abdomen: Nontender. Nondistended. No  rigidity, rebound, or guarding  MSK: extremities atraumatic x 4. Gait normal  Ext: no clubbing, cyanosis, or edema  Neuro: GCS 15. CN II-XII intact. Subjectively diminished sensation to light touch in left lower extremity. Left lower extremity with weakness of hip flexion relative to right  Skin: no bullae, petechiae, or purpura. Warm, dry, and intact.  Psych: normal mood and affect.    ED Course   Procedures  Labs Reviewed   COMPREHENSIVE METABOLIC PANEL - Abnormal; Notable for the following components:       Result Value    Sodium 134 (*)     Potassium 5.6 (*)     CO2 18 (*)     Glucose 145 (*)     BUN 34 (*)     Creatinine 3.8 (*)     Calcium 7.6 (*)     Albumin 3.4 (*)     eGFR 13.0 (*)     All other components within normal limits   PROTIME-INR - Abnormal; Notable for the following components:    Prothrombin Time 20.5 (*)     All other components within normal limits   TSH - Abnormal; Notable for the following components:    TSH 43.212 (*)     All other components within normal limits   ISTAT CREATININE - Abnormal; Notable for the following components:    POC Creatinine 3.9 (*)     All other components within normal limits   ISTAT PROCEDURE - Abnormal; Notable for the following components:    POC PTWBT 14.5 (*)     All other components within normal limits   LIPID PANEL   T4, FREE   URINALYSIS, REFLEX TO URINE CULTURE   POCT GLUCOSE, HAND-HELD DEVICE   ISTAT CHEM8     EKG Readings: (Independently Interpreted)   Initial Reading: No STEMI. Previous EKG: Compared with most recent EKG Previous EKG Date: 7/30/22. Rhythm: Normal Sinus Rhythm. Heart Rate: 80. Ectopy: No Ectopy. Conduction: Normal. ST Segments: Normal ST Segments. T Waves: Normal. Axis: Normal.   NO CHANGE FROM 7/30/22     Imaging Results              MRA Neck without contrast (Final result)  Result time 09/12/22 00:09:13      Final result by Tyler Medina DO (09/12/22 00:09:13)                   Impression:      Approximate 50% stenosis of the  left proximal ICA.  Otherwise unremarkable MRA neck.      Electronically signed by: Tyler Medina  Date:    09/12/2022  Time:    00:09               Narrative:    EXAMINATION:  MRA NECK WITHOUT CONTRAST    CLINICAL HISTORY:  Neuro deficit, acute, stroke suspected;    TECHNIQUE:  Non-contrast 2D and/or 3D time-of-flight MR angiography of the neck was performed, with MIP reformatting.    COMPARISON:  None    FINDINGS:  Aorta: Normal 3 vessel arch.    Extracranial carotid circulation: There is approximately 50% stenosis of the proximal left ICA.  No occlusion.  No aneurysmal dilation or dissection.    Extracranial vertebral circulation: No hemodynamically significant stenosis, aneurysmal dilatation, or dissection.                                       MRI Brain Without Contrast (Final result)  Result time 09/11/22 23:46:26      Final result by Tyler Medina DO (09/11/22 23:46:26)                   Impression:      1. No acute intracranial abnormality.  2. Chronic microvascular ischemic changes and multiple remote lacunar infarctions as detailed above.  3. MRA brain demonstrates no high-grade stenosis or occlusion of the proximal intracranial arteries.      Electronically signed by: Tyler Medina  Date:    09/11/2022  Time:    23:46               Narrative:    EXAMINATION:  MRI BRAIN WITHOUT CONTRAST; MRA BRAIN WITHOUT CONTRAST    CLINICAL HISTORY:  New neuro deficit, suspect stroke;; Neuro deficit, acute, stroke suspected;    TECHNIQUE:  Multiplanar multisequence MR imaging of the brain was performed without intravenous contrast.  Noncontrast time-of-flight MRA of the brain was performed with 3D MIP reformatting.    COMPARISON:  CT head from earlier the same date.    FINDINGS:  MRI brain:    There is no evidence of restricted diffusion to suggest an acute infarction.  Ventricles are normal in size for age without evidence of hydrocephalus.  There are multiple remote lacunar infarctions in the bilateral thalami,  bilateral basal ganglia, and the hamzah.  There is T2/FLAIR hyperintensity in the supratentorial white matter compatible with chronic microvascular ischemic changes.  There is a small focus of hemosiderin deposition in the left thalamus compatible with a remote microhemorrhage.  There is no intracranial mass.  No extra-axial blood or fluid collections. Normal vascular flow voids.  There is a small 1.0 cm para-falcine lipoma. Bone marrow signal intensity is normal. Paranasal sinuses and mastoid air cells are clear.    MRA brain: The anterior circulation vessels including the bilateral intracranial ICAs, MCAs, and ACAs are patent and unremarkable without high-grade stenosis or occlusion.  There is an LOBITO trifurcation, with an additional vessel taking its origin from the A-comm, a normal variant.  Posterior circulation vessels including the intracranial vertebral arteries, the basilar artery, and the bilateral PCAs are patent and unremarkable without high-grade stenosis or occlusion.  There is no intracranial aneurysm.                                       MRA Brain without contrast (Final result)  Result time 09/11/22 23:46:26      Final result by Tyler Medina DO (09/11/22 23:46:26)                   Impression:      1. No acute intracranial abnormality.  2. Chronic microvascular ischemic changes and multiple remote lacunar infarctions as detailed above.  3. MRA brain demonstrates no high-grade stenosis or occlusion of the proximal intracranial arteries.      Electronically signed by: Tyler Medina  Date:    09/11/2022  Time:    23:46               Narrative:    EXAMINATION:  MRI BRAIN WITHOUT CONTRAST; MRA BRAIN WITHOUT CONTRAST    CLINICAL HISTORY:  New neuro deficit, suspect stroke;; Neuro deficit, acute, stroke suspected;    TECHNIQUE:  Multiplanar multisequence MR imaging of the brain was performed without intravenous contrast.  Noncontrast time-of-flight MRA of the brain was performed with 3D MIP  reformatting.    COMPARISON:  CT head from earlier the same date.    FINDINGS:  MRI brain:    There is no evidence of restricted diffusion to suggest an acute infarction.  Ventricles are normal in size for age without evidence of hydrocephalus.  There are multiple remote lacunar infarctions in the bilateral thalami, bilateral basal ganglia, and the hamzah.  There is T2/FLAIR hyperintensity in the supratentorial white matter compatible with chronic microvascular ischemic changes.  There is a small focus of hemosiderin deposition in the left thalamus compatible with a remote microhemorrhage.  There is no intracranial mass.  No extra-axial blood or fluid collections. Normal vascular flow voids.  There is a small 1.0 cm para-falcine lipoma. Bone marrow signal intensity is normal. Paranasal sinuses and mastoid air cells are clear.    MRA brain: The anterior circulation vessels including the bilateral intracranial ICAs, MCAs, and ACAs are patent and unremarkable without high-grade stenosis or occlusion.  There is an LOBITO trifurcation, with an additional vessel taking its origin from the A-comm, a normal variant.  Posterior circulation vessels including the intracranial vertebral arteries, the basilar artery, and the bilateral PCAs are patent and unremarkable without high-grade stenosis or occlusion.  There is no intracranial aneurysm.                                       CT Head Without Contrast (Final result)  Result time 09/11/22 20:14:25   Procedure changed from CTA STROKE MULTI-PHASE     Final result by Phani Hinojosa MD (09/11/22 20:14:25)                   Impression:      No evidence of acute intracranial pathology, specifically no evidence of acute large vascular territory infarct or intracranial hemorrhage.    Electronically signed by resident: Filiberto Kimball  Date:    09/11/2022  Time:    19:58    Electronically signed by: Phani Hinojosa MD  Date:    09/11/2022  Time:    20:14               Narrative:     EXAMINATION:  CT HEAD WITHOUT CONTRAST    CLINICAL HISTORY:  Neuro deficit, acute, stroke suspected;    TECHNIQUE:  Low dose axial CT images obtained throughout the head without the use of intravenous contrast.  Axial, sagittal and coronal reconstructions were performed.    COMPARISON:  04/13/2021, 01/14/2019.    FINDINGS:  Intracranial compartment:    Ventricles and sulci are normal in size for age without evidence of hydrocephalus.    The brain parenchyma appears unchanged.  No parenchymal mass, hemorrhage, edema or major vascular distribution infarct.  Grossly stable bilateral mild periventricular white matter hypoattenuation likely sequela of chronic microvascular ischemic change.  Small focus of hypoattenuation in the central hamzah, possible remote infarct versus prominent perivascular space, unchanged from prior.  Similar small focus of hypoattenuation in the left thalamus possibly remote infarct.  No definite new focal areas of abnormal parenchymal attenuation.  Skull base atherosclerotic vascular calcifications noted.    No extra-axial blood or fluid collections.    Skull/extracranial contents (limited evaluation):    No fracture. Mastoid air cells and paranasal sinuses are essentially clear.  Imaged portions of the orbits are within normal limits.                                       Medications   sodium chloride 0.9% flush 5 mL (has no administration in time range)   albuterol-ipratropium 2.5 mg-0.5 mg/3 mL nebulizer solution 3 mL (has no administration in time range)   melatonin tablet 6 mg (has no administration in time range)   ondansetron disintegrating tablet 8 mg (has no administration in time range)   prochlorperazine injection Soln 5 mg (has no administration in time range)   polyethylene glycol packet 17 g (has no administration in time range)   bisacodyL suppository 10 mg (has no administration in time range)   simethicone chewable tablet 80 mg (has no administration in time range)    aluminum-magnesium hydroxide-simethicone 200-200-20 mg/5 mL suspension 30 mL (has no administration in time range)   acetaminophen tablet 650 mg (has no administration in time range)   acetaminophen tablet 1,000 mg (has no administration in time range)   naloxone 0.4 mg/mL injection 0.02 mg (has no administration in time range)   glucose chewable tablet 16 g (has no administration in time range)   glucose chewable tablet 24 g (has no administration in time range)   glucagon (human recombinant) injection 1 mg (has no administration in time range)   dextrose 10% bolus 125 mL (has no administration in time range)   dextrose 10% bolus 250 mL (has no administration in time range)   heparin (porcine) injection 5,000 Units (5,000 Units Subcutaneous Not Given 9/12/22 0623)   amLODIPine tablet 10 mg (10 mg Oral Given 9/12/22 0836)   cholecalciferol (vitamin D3) 125 mcg (5,000 unit) tablet 5,000 Units (5,000 Units Oral Given 9/12/22 0836)   furosemide tablet 20 mg (20 mg Oral Given 9/12/22 0836)   carboxymethylcellulose sodium 1 % DpGe 1 drop (has no administration in time range)   traZODone tablet 50 mg (has no administration in time range)   levothyroxine tablet 200 mcg (200 mcg Oral Given 9/12/22 0623)   EScitalopram oxalate tablet 10 mg (10 mg Oral Given 9/12/22 0836)   hydrALAZINE tablet 25 mg (has no administration in time range)   carvediloL tablet 25 mg (25 mg Oral Given 9/12/22 7233)   metoclopramide HCl injection 10 mg (10 mg Intravenous Given 9/11/22 2218)     Medical Decision Making:   History:   Old Medical Records: I decided to obtain old medical records.  Old Records Summarized: records from previous admission(s) and records from clinic visits.  Initial Assessment:   SEE ED COURSE FOR MDM  Clinical Tests:   Lab Tests: Ordered and Reviewed  Radiological Study: Ordered  Medical Tests: Ordered and Reviewed  ED Management:  SEE ED COURSE. BELOW. CT unremarkable. MRI pending at time of transition of care to   [facility]  Other:   I have discussed this case with another health care provider.  Additional MDM:     NIH Stroke Scale:   Interval = baseline (upon arrival/admit)  Level of consciousness = 0 - alert  LOC questions = 0 - answers both correctly  LOC commands = 0 - performs both correctly  Best gaze = 0 - normal  Visual = 0 - no visual loss  Facial palsy = 0 - normal  Motor left arm =  0 - no drift  Motor right arm =  0 - no drift  Motor left leg = 1 - drift  Motor right leg =  0 - no drift  Limb ataxia = 0 - absent  Sensory = 0 - normal  Best language = 0 - no aphasia  Dysarthria = 0 - normal articulation  Extinction and inattention = 0 - no neglect  NIH Stroke Scale Total = 1          ED Course as of 09/12/22 1227   Sun Sep 11, 2022   1957 62-year-old female history of CVA, hypertension, hyperlipidemia, diabetes, chronic kidney disease.  Patient will get 5:00 a.m. this morning and noted that she had some left-sided head and face pain.  She felt some paresthesias on the left side of her neck and her left upper extremity.  She has pain when she rotates her neck to the left.  She reports that her left side feels weaker than her right side.  She does not believe she has residual deficits from her prior CVA. [DS]   1957 On examination, patient has an NIH stroke scale of 2 for some subjective sensory deficits in left lower extremity as well as a relative weakness of the left leg with some drift.  Code stroke activated as patient would be an IR candidate based on the duration of her symptoms.  CT noncontrast obtained.  Patient seen by vascular neurology recommended MRI MRA will place order for same.  Patient noted at 20 care glucose of 268.  Will obtain EKG.  Will obtain lab assays.   [DS]   2033 CT brain without acute abnormality.  MRI MRA pending [DS]   2151 Patient is not complaining dysarthria.  She has slightly slurred speech.  Otherwise her neurologic exam is not changed.  She remains in no distress.  I notified  vascular Neurology of this change in her condition.  I had the nurse call MRI to expedite her imaging.  Discussed with vascular Neurology and urgent lowering blood pressure is not clinically indicated.  Hyperkalemia noted likely secondary to hemolysis.  Will repeat i-STAT chemistry. [DS]      ED Course User Index  [DS] Varun Richard MD             Clinical Impression:   Final diagnoses:  [I63.9] Stroke        ED Disposition Condition    Observation                 Varun Richard MD  09/12/22 1604

## 2022-09-12 NOTE — ASSESSMENT & PLAN NOTE
- Persistent headache most likely 2/2 uncontrolled blood pressure  - Control blood pressure, neurology recommendation of SBP goal <220  - Tylenol PRN for pain  - Monitor for any change in symptoms

## 2022-09-12 NOTE — ASSESSMENT & PLAN NOTE
Patient is a 62y F with PMH of hypothyroidism, HTN, HLD, CKD, Factor XI deficiency, prior stroke, obesity, and DM. Patient presented to ED with HA, LSN and LLE weakness that she noticed when she woke up today. Patient reports having symptoms upon waking up at 5am today but does not recall when she went to bed normal last night. Patient laid back down but still had symptoms. She is not a tpa candidate, OOW. On initial exam, mild LSN (face, arm, and leg) and LLE weakness (antigravity but notably weaker). She c/o HA. No visual deficits, neglect, or speech deficits appreciated. Symptoms at this time not suggestive of LVO. Patient went for CTA MP but only received CTH due to GFR of 14 without being on dialysis. She will go for MRI brain and MRA brain/neck for further stroke evaluation.     SBP goal <220 at this time until acute infarct is ruled out. Stroke team will follow up on imaging for further recommendations regarding dispo. Case discussed with staff.

## 2022-09-12 NOTE — PLAN OF CARE
Problem: Infection  Goal: Absence of Infection Signs and Symptoms  Outcome: Ongoing, Progressing     Problem: Diabetes Comorbidity  Goal: Blood Glucose Level Within Targeted Range  Outcome: Ongoing, Progressing   Poc and meds reviewed with patient.All questions answered, will continue to monitor

## 2022-09-12 NOTE — HOSPITAL COURSE
9/12/2022: MRI negative for acute infarct. MRA with L ICA stenosis. Consider adding ASA 81 to medication regimen. Patient with improvement in headache(2-3/10). Mild LLE weakness when compared to the right.

## 2022-09-12 NOTE — ASSESSMENT & PLAN NOTE
- , on 9/7/22   - Neurology recommendations included starting statin, if able to tolerate  - PCP discontinued atorvastatin due to normal cholesterol and adverse side effect of stomach pain  - Will defer to PCP for decision on resuming statin therapy

## 2022-09-12 NOTE — ED NOTES
Pt c/o HA since 0500 this am. Pt states she has also had some L sided weakness and numbness and tingling on the L side of her face. Pt has hx stroke x4 years ago. Pt is resting on stretcher, NAD, [id number] and nurse is at bedside. No O2 needed; pt at 100% on RA.

## 2022-09-12 NOTE — ASSESSMENT & PLAN NOTE
- TSH 43.212, Free T4 0.96  - Continue home synthroid 200 mcg PO, before breakfast, daily  - Monitor for symptoms

## 2022-09-12 NOTE — H&P
[facility] - Neurosurgery ([facility]  History & Physical    Patient Name: Kathie Quezada  MRN: [id number]  Patient Class: OP- Observation  Admission Date: 9/11/2022  Attending Physician: Giselle Raines MD   Primary Care Provider: Elan Price MD         Patient information was obtained from patient, past medical records and ER records.     Subjective:     Principal Problem:Headache    Chief Complaint:   Chief Complaint   Patient presents with    Headache     Pt c/o dull HA that radiates down to L side of neck and hypertension. Hx of previous stroke         HPI: Kathie Quezada is a 62 y.o. female with PMHx of HTN, HLD, CKD, diastolic HF, T2DM, stroke (2017) and obesity presents to the ED for possible stroke. Patient woke up at 5 AM with headache and left LE weakness. She returned to sleep, woke up at 8 AM and the headache had not improved. Patient took her blood pressure, noted it to be very high, contacted her doctor who instructed her to take her medications and continue monitoring it. She stated that the blood pressure would improve when she would lay down, however it would worsen when she got up to do any housework. In the afternoon, she began experiencing nausea, light headedness, chills, blurry vision and left sided facial numbness. She also noted that her speech was slower and slurred. Of note, patient stated her blood pressure has been uncontrolled since 9/7/2022. Upon arrival to the ED, stroke code was activated, was not a candidate for tPA. Vascular neurology was consulted, work-up was negative for stroke. Admitted for observation. Denies chest pain, dyspnea, vomiting, fever.    ED: /98, HR 81, afebrile, SpO2 100% on RA. CMP: Na 134, K 5.6, Cr 3.8, Glucose 145. TSH 43.212. Vascular neurology consulted.  CT Head: No evidence of acute intracranial pathology, specifically no evidence of acute large vascular territory infarct or intracranial hemorrhage.  MRI Brain: No  acute intracranial abnormality  MRA Neck: Approximate 50% stenosis of the left proximal ICA.      Past Medical History:   Diagnosis Date    Acute respiratory failure with hypoxia and hypercapnia 5/26/2019    ATN (acute tubular necrosis) 2/8/2019    Chronic diastolic heart failure 2/7/2019 2-8-19 Mild left atrial enlargement. Mild left ventricular enlargement. Normal left ventricular systolic function. The estimated ejection fraction is 55% Indeterminate left ventricular diastolic function. Normal right ventricular systolic function. Mild mitral regurgitation. Mild to moderate tricuspid regurgitation. The estimated PA systolic pressure is 32 mm Hg Normal central venous pressure (3 m    CKD (chronic kidney disease) stage 4, GFR 15-29 ml/min 5/16/2018    CKD stage G3a/A3, GFR 45-59 and albumin creatinine ratio >300 mg/g 5/16/2018    CKD stage G3b/A3, GFR 30-44 and albumin creatinine ratio >300 mg/g 5/16/2018    Closed compression fracture of L2 lumbar vertebra 5/24/2019    Closed compression fracture of L2 lumbar vertebra 5/24/2019     IMO Regulatory Load October 2019    Controlled type 2 diabetes with retinopathy 4/9/2018    COVID-19 virus infection 7-1-2021 7/1/2021    CVA (cerebral vascular accident) 2017    Diabetic polyneuropathy associated with type 2 diabetes mellitus 12/4/2020    Essential hypertension 4/9/2018    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  7-31-19 Factor XI Activity 55 - 145 % 96       Gastroesophageal reflux disease without esophagitis 5/24/2019    Generalized anxiety disorder 11/8/2019    GERD (gastroesophageal reflux disease)     GIB (gastrointestinal bleeding) 10/18/2018    History of hepatitis C     s/p succesful Rx w/ Harvoni  - SVR12 (cure) 2016    Hypertensive retinopathy, bilateral 12/10/2019    Mild nonproliferative diabetic retinopathy 05/02/2018    Mixed hyperlipidemia 4/9/2018    Myalgia due to statin 9/7/2022    Normocytic anemia  1/14/2022    NS (nuclear sclerosis), bilateral 12/10/2019    PNA (pneumonia) 5/28/2019    Postoperative hypothyroidism     Renovascular hypertension 4/9/2018    S/P kyphoplasty 8/14/2019 8/14/19: L2 kyphoplasty with Dr. Kwon     Slow transit constipation 5/27/2019    Type 2 diabetes mellitus with circulatory disorder, with long-term current use of insulin 4/9/2018    Type 2 diabetes mellitus with stage 3 chronic kidney disease, without long-term current use of insulin 4/9/2018    Type 2 diabetes mellitus with stage 3a chronic kidney disease, without long-term current use of insulin 3/3/2021    Type 2 diabetes mellitus with stage 3b chronic kidney disease, without long-term current use of insulin 9/7/2022       Past Surgical History:   Procedure Laterality Date    BACK SURGERY  2017    CHOLECYSTECTOMY  2015    [facility]    COLONOSCOPY  11/13/2015    Dr Chino torres prep. internal hemorrhoids. diverticulosis of sigmois. NO polyps. repeat due 2025    FIXATION KYPHOPLASTY Bilateral 08/14/2019    Procedure: L2 kyphoplasty Fluoro Globus;  Surgeon: Jeremie Kwon DO;  [facility];  Service: Neurosurgery;  Laterality: Bilateral;  [facility] MACR[facility] WINSLOW [phone] TEXTED HER @ 10:54AM ON 7-30-19  PRE-OP-BY RN  8-7-2019    HYSTERECTOMY      OOPHORECTOMY  1996    one    TH[facility]ROID[facility]Y  2016    at [facility] nodule    TONSILLECTOMY         Review of patient's allergies indicates:   Allergen Reactions    Mushroom Hives    Bactrim [sulfamethoxazole-trimethoprim] Hives    Codeine Hives    Gabapentin      Pruritis     Iodine     Peanut butter flavor     Penicillins Hives    Shellfish containing products Itching    Sulfa (sulfonamide antibiotics) Hives       No current facility-administered medications on file prior to encounter.     Current Outpatient Medications on File Prior to Encounter   Medication Sig    amLODIPine (NORVASC) 10 MG tablet Take 1 tablet (10 mg total) by mouth once daily.     carvediloL (COREG) 25 MG tablet Take 1 tablet (25 mg total) by mouth 2 (two) times daily with meals.    cholecalciferol, vitamin D3, 125 mcg (5,000 unit) Tab Take 1 tablet (5,000 Units total) by mouth once daily.    EScitalopram oxalate (LEXAPRO) 10 MG tablet Take 1 tablet (10 mg total) by mouth once daily. (Patient not taking: Reported on 9/7/2022)    furosemide (LASIX) 20 MG tablet Take 1 tablet (20 mg total) by mouth once daily.    ondansetron (ZOFRAN-ODT) 4 MG TbDL Take 1 tablet (4 mg total) by mouth every 8 (eight) hours as needed (nausea).    peg 400/hypromellose/glycerin (VISINE DRY EYE RELIEF OPHT) Apply 1 drop to eye 2 (two) times daily as needed (BOTH EYES).    SYNTHROID 200 mcg tablet Take 1 tablet (200 mcg total) by mouth before breakfast. (Patient not taking: Reported on 9/7/2022)    traZODone (DESYREL) 50 MG tablet Take 1 tablet (50 mg total) by mouth nightly as needed for Insomnia.     Family History       Problem Relation (Age of Onset)    Diabetes type II Mother          Tobacco Use    Smoking status: Never    Smokeless tobacco: Never   Substance and Sexual Activity    Alcohol use: Yes     Alcohol/week: 4.0 standard drinks     Types: 4 Cans of beer per week     Comment: weekly    Drug use: No    Sexual activity: Not on file     Review of Systems   Constitutional:  Positive for chills. Negative for activity change, diaphoresis and fever.   HENT:  Negative for congestion and trouble swallowing.    Eyes:  Positive for visual disturbance (blurred vision). Negative for photophobia.   Respiratory:  Negative for cough, chest tightness and shortness of breath.    Cardiovascular:  Negative for chest pain, palpitations and leg swelling.   Gastrointestinal:  Positive for diarrhea (resolved now). Negative for abdominal pain, constipation, nausea and vomiting.   Genitourinary:  Negative for dysuria, frequency and hematuria.   Musculoskeletal:  Negative for back pain, gait problem and neck pain.    Skin:  Negative for rash and wound.   Neurological:  Positive for speech difficulty (slurred and slower speech), numbness (+L facial, +L leg) and headaches. Negative for syncope and light-headedness.   Psychiatric/Behavioral:  Negative for agitation and confusion. The patient is not nervous/anxious.    Objective:     Vital Signs (Most Recent):  Temp: 98.6 °F (37 °C) (09/11/22 1917)  Pulse: 81 (09/12/22 0027)  Resp: 17 (09/12/22 0027)  BP: (!) 181/97 (09/12/22 0027)  SpO2: 100 % (09/12/22 0027)   Vital Signs (24h Range):  Temp:  [98.6 °F (37 °C)] 98.6 °F (37 °C)  Pulse:  [79-81] 81  Resp:  [16-17] 17  SpO2:  [98 %-100 %] 100 %  BP: (156-185)/(77-98) 181/97        There is no height or weight on file to calculate BMI.    Physical Exam  Vitals and nursing note reviewed.   Constitutional:       General: She is not in acute distress.     Appearance: She is well-developed. She is obese.   HENT:      Head: Normocephalic and atraumatic.      Mouth/Throat:      Pharynx: No oropharyngeal exudate.   Eyes:      Extraocular Movements: Extraocular movements intact.      Conjunctiva/sclera: Conjunctivae normal.   Cardiovascular:      Rate and Rhythm: Normal rate and regular rhythm.      Heart sounds: Normal heart sounds.   Pulmonary:      Effort: Pulmonary effort is normal. No respiratory distress.      Breath sounds: Normal breath sounds. No wheezing.   Abdominal:      General: Bowel sounds are normal. There is no distension.      Palpations: Abdomen is soft.      Tenderness: There is no abdominal tenderness.   Musculoskeletal:         General: No tenderness. Normal range of motion.      Cervical back: Normal range of motion and neck supple.   Lymphadenopathy:      Cervical: No cervical adenopathy.   Skin:     General: Skin is warm and dry.      Capillary Refill: Capillary refill takes less than 2 seconds.      Findings: No rash.   Neurological:      General: No focal deficit present.      Mental Status: She is alert and  oriented to person, place, and time.      Cranial Nerves: No cranial nerve deficit.      Sensory: No sensory deficit.      Coordination: Coordination normal.   Psychiatric:         Behavior: Behavior normal.         Thought Content: Thought content normal.         Judgment: Judgment normal.        Significant Labs: All pertinent labs within the past 24 hours have been reviewed.  BMP:   Recent Labs   Lab 09/11/22 2106   *   *   K 5.6*      CO2 18*   BUN 34*   CREATININE 3.8*   CALCIUM 7.6*     CMP:   Recent Labs   Lab 09/11/22 2106   *   K 5.6*      CO2 18*   *   BUN 34*   CREATININE 3.8*   CALCIUM 7.6*   PROT 7.2   ALBUMIN 3.4*   BILITOT 0.4   ALKPHOS 114   AST 27   ALT 16   ANIONGAP 10     TSH:   Recent Labs   Lab 09/11/22 2106   TSH 43.212*       Significant Imaging: I have reviewed all pertinent imaging results/findings within the past 24 hours.    Assessment/Plan:     * Headache  - Persistent headache most likely 2/2 uncontrolled blood pressure  - Control blood pressure, neurology recommendation of SBP goal <220  - Tylenol PRN for pain  - Monitor for any change in symptoms    History of stroke  - Stroke in 2017    Weakness of left lower extremity  Left sided numbness  History of Stroke    - Vascular neurology noted antigravity, LLE falls to bed before counting to 5  - Consult to follow up with further recommendations    Type 2 diabetes mellitus with stage 4 chronic kidney disease, without long-term current use of insulin  - Per patient, home diabetes medications discontinued by PCP due to CKD  - Monitor blood glucose with routine POCT     Last A1c reviewed-   Lab Results   Component Value Date    HGBA1C 6.8 (H) 09/07/2022       Class 2 severe obesity due to excess calories with serious comorbidity and body mass index (BMI) of 35.0 to 35.9 in adult  Body mass index is 38.4 kg/m². Morbid obesity complicates all aspects of disease management from diagnostic modalities to  treatment. Weight loss encouraged and health benefits explained to patient.     Chronic diastolic heart failure  - Last TTE was 2019, EF 55%  - Order echo for AM  - Continue home medication lasix 20 mg PO daily    CKD (chronic kidney disease) stage 4, GFR 15-29 ml/min  - Cr 3.8  - Monitor with daily BMP  - PCP placed nephrology referral, follow-up outpatient    Mixed hyperlipidemia  - , on 9/7/22   - Neurology recommendations included starting statin, if able to tolerate  - PCP discontinued atorvastatin due to normal cholesterol and adverse side effect of stomach pain  - Will defer to PCP for decision on resuming statin therapy      Renovascular hypertension  - Continue home HTN medications: amlodipine 10 mg PO daily and coreg 25 mg PO daily  - Monitor BP with q4h vital checks    Postoperative hypothyroidism  - TSH 43.212, Free T4 0.96  - Continue home synthroid 200 mcg PO, before breakfast, daily  - Monitor for symptoms      VTE Risk Mitigation (From admission, onward)         Ordered     heparin (porcine) injection 5,000 Units  Every 8 hours         09/12/22 0143     IP VTE HIGH RISK PATIENT  Once         09/12/22 0143                   Luis Angel Vieira PA-C  Department of Hospital Medicine   [facility] - Neurosurgery ([facility])

## 2022-09-12 NOTE — ASSESSMENT & PLAN NOTE
- Continue home HTN medications: amlodipine 10 mg PO daily and coreg 25 mg PO daily  - Monitor BP with q4h vital checks

## 2022-09-12 NOTE — SUBJECTIVE & OBJECTIVE
Past Medical History:   Diagnosis Date    Acute respiratory failure with hypoxia and hypercapnia 5/26/2019    ATN (acute tubular necrosis) 2/8/2019    Chronic diastolic heart failure 2/7/2019 2-8-19 Mild left atrial enlargement. Mild left ventricular enlargement. Normal left ventricular systolic function. The estimated ejection fraction is 55% Indeterminate left ventricular diastolic function. Normal right ventricular systolic function. Mild mitral regurgitation. Mild to moderate tricuspid regurgitation. The estimated PA systolic pressure is 32 mm Hg Normal central venous pressure (3 m    CKD (chronic kidney disease) stage 4, GFR 15-29 ml/min 5/16/2018    CKD stage G3a/A3, GFR 45-59 and albumin creatinine ratio >300 mg/g 5/16/2018    CKD stage G3b/A3, GFR 30-44 and albumin creatinine ratio >300 mg/g 5/16/2018    Closed compression fracture of L2 lumbar vertebra 5/24/2019    Closed compression fracture of L2 lumbar vertebra 5/24/2019     IMO Regulatory Load October 2019    Controlled type 2 diabetes with retinopathy 4/9/2018    COVID-19 virus infection 7-1-2021 7/1/2021    CVA (cerebral vascular accident) 2017    Diabetic polyneuropathy associated with type 2 diabetes mellitus 12/4/2020    Essential hypertension 4/9/2018    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  7-31-19 Factor XI Activity 55 - 145 % 96       Gastroesophageal reflux disease without esophagitis 5/24/2019    Generalized anxiety disorder 11/8/2019    GERD (gastroesophageal reflux disease)     GIB (gastrointestinal bleeding) 10/18/2018    History of hepatitis C     s/p succesful Rx w/ Harvoni  - SVR12 (cure) 2016    Hypertensive retinopathy, bilateral 12/10/2019    Mild nonproliferative diabetic retinopathy 05/02/2018    Mixed hyperlipidemia 4/9/2018    Myalgia due to statin 9/7/2022    Normocytic anemia 1/14/2022    NS (nuclear sclerosis), bilateral 12/10/2019    PNA (pneumonia) 5/28/2019    Postoperative hypothyroidism      Renovascular hypertension 4/9/2018    S/P kyphoplasty 8/14/2019 8/14/19: L2 kyphoplasty with Dr. Kwon     Slow transit constipation 5/27/2019    Type 2 diabetes mellitus with circulatory disorder, with long-term current use of insulin 4/9/2018    Type 2 diabetes mellitus with stage 3 chronic kidney disease, without long-term current use of insulin 4/9/2018    Type 2 diabetes mellitus with stage 3a chronic kidney disease, without long-term current use of insulin 3/3/2021    Type 2 diabetes mellitus with stage 3b chronic kidney disease, without long-term current use of insulin 9/7/2022       Past Surgical History:   Procedure Laterality Date    BACK SURGERY  2017    CHOLECYSTECTOMY  2015    [facility]    COLONOSCOPY  11/13/2015    Dr Chino torres prep. internal hemorrhoids. diverticulosis of sigmois. NO polyps. repeat due 2025    FIXATION KYPHOPLASTY Bilateral 08/14/2019    Procedure: L2 kyphoplasty Fluoro Globus;  Surgeon: Jeremie Kwon DO;  Location: [facility];  Service: Neurosurgery;  Laterality: Bilateral;  [facility] MACRINA WINSLOW [phone] TEXTED HER @ 10:54AM ON 7-30-19  PRE-OP-BY RN  8-7-2019    HYSTERECTOMY      OOPHORECTOMY  1996    one    THYROIDECTOMY  2016    at [facility] for thyroid nodule    TONSILLECTOMY         Review of patient's allergies indicates:   Allergen Reactions    Mushroom Hives    Bactrim [sulfamethoxazole-trimethoprim] Hives    Codeine Hives    Gabapentin      Pruritis     Iodine     Peanut butter flavor     Penicillins Hives    Shellfish containing products Itching    Sulfa (sulfonamide antibiotics) Hives       No current facility-administered medications on file prior to encounter.     Current Outpatient Medications on File Prior to Encounter   Medication Sig    amLODIPine (NORVASC) 10 MG tablet Take 1 tablet (10 mg total) by mouth once daily.    carvediloL (COREG) 25 MG tablet Take 1 tablet (25 mg total) by mouth 2 (two) times daily with meals.    cholecalciferol, vitamin D3, 125 mcg (5,000  unit) Tab Take 1 tablet (5,000 Units total) by mouth once daily.    EScitalopram oxalate (LEXAPRO) 10 MG tablet Take 1 tablet (10 mg total) by mouth once daily. (Patient not taking: Reported on 9/7/2022)    furosemide (LASIX) 20 MG tablet Take 1 tablet (20 mg total) by mouth once daily.    ondansetron (ZOFRAN-ODT) 4 MG TbDL Take 1 tablet (4 mg total) by mouth every 8 (eight) hours as needed (nausea).    peg 400/hypromellose/glycerin (VISINE DRY EYE RELIEF OPHT) Apply 1 drop to eye 2 (two) times daily as needed (BOTH EYES).    SYNTHROID 200 mcg tablet Take 1 tablet (200 mcg total) by mouth before breakfast. (Patient not taking: Reported on 9/7/2022)    traZODone (DESYREL) 50 MG tablet Take 1 tablet (50 mg total) by mouth nightly as needed for Insomnia.     Family History       Problem Relation (Age of Onset)    Diabetes type II Mother          Tobacco Use    Smoking status: Never    Smokeless tobacco: Never   Substance and Sexual Activity    Alcohol use: Yes     Alcohol/week: 4.0 standard drinks     Types: 4 Cans of beer per week     Comment: weekly    Drug use: No    Sexual activity: Not on file     Review of Systems   Constitutional:  Positive for chills. Negative for activity change, diaphoresis and fever.   HENT:  Negative for congestion and trouble swallowing.    Eyes:  Positive for visual disturbance (blurred vision). Negative for photophobia.   Respiratory:  Negative for cough, chest tightness and shortness of breath.    Cardiovascular:  Negative for chest pain, palpitations and leg swelling.   Gastrointestinal:  Positive for diarrhea (resolved now). Negative for abdominal pain, constipation, nausea and vomiting.   Genitourinary:  Negative for dysuria, frequency and hematuria.   Musculoskeletal:  Negative for back pain, gait problem and neck pain.   Skin:  Negative for rash and wound.   Neurological:  Positive for speech difficulty (slurred and slower speech), numbness (+L facial, +L leg) and headaches.  Negative for syncope and light-headedness.   Psychiatric/Behavioral:  Negative for agitation and confusion. The patient is not nervous/anxious.    Objective:     Vital Signs (Most Recent):  Temp: 98.6 °F (37 °C) (09/11/22 1917)  Pulse: 81 (09/12/22 0027)  Resp: 17 (09/12/22 0027)  BP: (!) 181/97 (09/12/22 0027)  SpO2: 100 % (09/12/22 0027)   Vital Signs (24h Range):  Temp:  [98.6 °F (37 °C)] 98.6 °F (37 °C)  Pulse:  [79-81] 81  Resp:  [16-17] 17  SpO2:  [98 %-100 %] 100 %  BP: (156-185)/(77-98) 181/97        There is no height or weight on file to calculate BMI.    Physical Exam  Vitals and nursing note reviewed.   Constitutional:       General: She is not in acute distress.     Appearance: She is well-developed. She is obese.   HENT:      Head: Normocephalic and atraumatic.      Mouth/Throat:      Pharynx: No oropharyngeal exudate.   Eyes:      Extraocular Movements: Extraocular movements intact.      Conjunctiva/sclera: Conjunctivae normal.   Cardiovascular:      Rate and Rhythm: Normal rate and regular rhythm.      Heart sounds: Normal heart sounds.   Pulmonary:      Effort: Pulmonary effort is normal. No respiratory distress.      Breath sounds: Normal breath sounds. No wheezing.   Abdominal:      General: Bowel sounds are normal. There is no distension.      Palpations: Abdomen is soft.      Tenderness: There is no abdominal tenderness.   Musculoskeletal:         General: No tenderness. Normal range of motion.      Cervical back: Normal range of motion and neck supple.   Lymphadenopathy:      Cervical: No cervical adenopathy.   Skin:     General: Skin is warm and dry.      Capillary Refill: Capillary refill takes less than 2 seconds.      Findings: No rash.   Neurological:      General: No focal deficit present.      Mental Status: She is alert and oriented to person, place, and time.      Cranial Nerves: No cranial nerve deficit.      Sensory: No sensory deficit.      Coordination: Coordination normal.    Psychiatric:         Behavior: Behavior normal.         Thought Content: Thought content normal.         Judgment: Judgment normal.        Significant Labs: All pertinent labs within the past 24 hours have been reviewed.  BMP:   Recent Labs   Lab 09/11/22 2106   *   *   K 5.6*      CO2 18*   BUN 34*   CREATININE 3.8*   CALCIUM 7.6*     CMP:   Recent Labs   Lab 09/11/22 2106   *   K 5.6*      CO2 18*   *   BUN 34*   CREATININE 3.8*   CALCIUM 7.6*   PROT 7.2   ALBUMIN 3.4*   BILITOT 0.4   ALKPHOS 114   AST 27   ALT 16   ANIONGAP 10     TSH:   Recent Labs   Lab 09/11/22 2106   TSH 43.212*       Significant Imaging: I have reviewed all pertinent imaging results/findings within the past 24 hours.

## 2022-09-12 NOTE — CONSULTS
[facility] - Emergency Dept  Vascular Neurology  [facility]  Consult Note    Inpatient consult to Vascular (Stroke) Neurology  Consult performed by: Greg Wheat PA-C  Consult ordered by: Varun Richard MD      Assessment/Plan:     Patient is a 62 y.o. year old female with:    * Headache  Patient is a 62y F with PMH of hypothyroidism, HTN, HLD, CKD, Factor XI deficiency, prior stroke, obesity, and DM. Patient presented to ED with HA, LSN and LLE weakness that she noticed when she woke up today. Patient reports having symptoms upon waking up at 5am today but does not recall when she went to bed normal last night. Patient laid back down but still had symptoms. She is not a tpa candidate, OOW. On initial exam, mild LSN (face, arm, and leg) and LLE weakness (antigravity but notably weaker). She c/o HA. No visual deficits, neglect, or speech deficits appreciated. Symptoms at this time not suggestive of LVO. Patient went for CTA MP but only received CTH due to GFR of 14 without being on dialysis. She will go for MRI brain and MRA brain/neck for further stroke evaluation.     SBP goal <220 at this time until acute infarct is ruled out. Stroke team will follow up on imaging for further recommendations regarding dispo. Case discussed with staff.     History of stroke  Stroke in 2017   No additional info per chart review   Patient reports HA and dizziness and weakness with this stroke   Denies residual deficits   Reports not on AP/AC due to Factor XI deficiency     Weakness of left lower extremity  Antigravity   Falls to bed before counting to 5   Reports some pain with this action as well   See HA    Left sided numbness  L side face, arm, and leg   Rick-hypoesthesia   See HA     Type 2 diabetes mellitus with stage 4 chronic kidney disease, without long-term current use of insulin  Stroke RF   A1c 6.8   IP glucose goal 140-180 if admitted     Class 2 severe obesity due to excess calories with serious  comorbidity and body mass index (BMI) of 35.0 to 35.9 in adult   on diet and exercise   Stroke RF     Chronic diastolic heart failure  Last TTE was 2019 per chart review   EF 55%   Mild LAE, mild LV enlargement     CKD (chronic kidney disease) stage 4, GFR 15-29 ml/min  GFR 14 on 9/7/22   Contraindication for CTA in this lower acuity setting, will obtain MRA instead   Not on dialysis       Mixed hyperlipidemia  Stroke RF  LDL on 9/7/22 103   No statin at home , Consider adding  statin if able to tolerate     Renovascular hypertension  Stroke RF    on arrival   SBP goal <220 for now until acute infarct is ruled out     Postoperative hypothyroidism  On synthroid at home, okay to continue       STROKE DOCUMENTATION     Acute Stroke Times   Last Known Normal Date: 09/11/22  Unknown Normal Time: Unknown Time  Symptom Onset Date: 09/11/22  Unknown Symptom Onset Date: Unknown Date  Symptom Onset Time: 0500  Stroke Team Called Date: 09/11/22  Stroke Team Called Time: 1942  Stroke Team Arrival Date: 09/11/22  Stroke Team Arrival Time: 1944  CT Interpretation Time: 1957  Alteplase Recommended: No  CTA Interpretation Time:  (unable to obtain due to GFR of 14, not on dialysis )  Thrombectomy Recommended: No    NIH Scale:  1a. Level of Consciousness: 0-->Alert, keenly responsive  1b. LOC Questions: 0-->Answers both questions correctly  1c. LOC Commands: 0-->Performs both tasks correctly  2. Best Gaze: 0-->Normal  3. Visual: 0-->No visual loss  4. Facial Palsy: 0-->Normal symmetrical movements  5a. Motor Arm, Left: 0-->No drift, limb holds 90 (or 45) degrees for full 10 secs  5b. Motor Arm, Right: 0-->No drift, limb holds 90 (or 45) degrees for full 10 secs  6a. Motor Leg, Left: 2-->Some effort against gravity, leg falls to bed by 5 secs, but has some effort against gravity  6b. Motor Leg, Right: 0-->No drift, leg holds 30 degree position for full 5 secs  7. Limb Ataxia: 0-->Absent  8. Sensory: 1-->Mild-to-moderate  sensory loss, patient feels pinprick is less sharp or is dull on the affected side, or there is a loss of superficial pain with pinprick, but patient is aware of being touched  9. Best Language: 0-->No aphasia, normal  10. Dysarthria: 0-->Normal  11. Extinction and Inattention (formerly Neglect): 0-->No abnormality  Total (NIH Stroke Scale): 3    Modified Bianka Score: 0  Randell Coma Scale:    ABCD2 Score:    [id number] Score:   HAS -BLED Score:   ICH Score:   Hunt & Ortega Classification:       Thrombolysis Candidate? No, Out of window     Delays to Thrombolysis?  Not Applicable    Interventional Revascularization Candidate?   Is the patient eligible for mechanical endovascular reperfusion (DESMOND)?  No; no significant neurologic deficit (NIHSS <6)  and No; at this time symptoms not suggestive of large vessel occlusion    Delays to Thrombectomy? Not Applicable    Hemorrhagic change of an Ischemic Stroke: Does this patient have an ischemic stroke with hemorrhagic changes? No     Subjective:     History of Present Illness:  Ms. Quezada is a 62 year old female with PMH of hypothyroidism, HTN, HLD, CKD, Factor XI deficiency, prior stroke (no residual deficit), obesity, and DM. Patient presented to ED with HA, LSN and LLE weakness that she noticed when she woke up today. Patient reports having symptoms upon waking up at 5am today but does not recall when she went to bed normal last night. Patient laid back down but still had symptoms. She is not a tpa candidate, OOW. On initial exam, mild LSN (face, arm, and leg) and LLE weakness (antigravity but notably weaker). She c/o HA. No visual deficits, neglect, or speech deficits appreciated. Symptoms at this time not suggestive of LVO. Patient went for CTA MP but only received CTH due to GFR of 14 without being on dialysis. She will go for MRI brain and MRA brain/neck for further stroke evaluation.           Past Medical History:   Diagnosis Date    Acute respiratory failure  with hypoxia and hypercapnia 5/26/2019    ATN (acute tubular necrosis) 2/8/2019    Chronic diastolic heart failure 2/7/2019 2-8-19 Mild left atrial enlargement. Mild left ventricular enlargement. Normal left ventricular systolic function. The estimated ejection fraction is 55% Indeterminate left ventricular diastolic function. Normal right ventricular systolic function. Mild mitral regurgitation. Mild to moderate tricuspid regurgitation. The estimated PA systolic pressure is 32 mm Hg Normal central venous pressure (3 m    CKD (chronic kidney disease) stage 4, GFR 15-29 ml/min 5/16/2018    CKD stage G3a/A3, GFR 45-59 and albumin creatinine ratio >300 mg/g 5/16/2018    CKD stage G3b/A3, GFR 30-44 and albumin creatinine ratio >300 mg/g 5/16/2018    Closed compression fracture of L2 lumbar vertebra 5/24/2019    Closed compression fracture of L2 lumbar vertebra 5/24/2019     IMO Regulatory Load October 2019    Controlled type 2 diabetes with retinopathy 4/9/2018    COVID-19 virus infection 7-1-2021 7/1/2021    CVA (cerebral vascular accident) 2017    Diabetic polyneuropathy associated with type 2 diabetes mellitus 12/4/2020    Essential hypertension 4/9/2018    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  7-31-19 Factor XI Activity 55 - 145 % 96       Gastroesophageal reflux disease without esophagitis 5/24/2019    Generalized anxiety disorder 11/8/2019    GERD (gastroesophageal reflux disease)     GIB (gastrointestinal bleeding) 10/18/2018    History of hepatitis C     s/p succesful Rx w/ [facility]  - SVR12 (cure) 2016    Hypertensive retinopathy, bilateral 12/10/2019    Mild nonproliferative diabetic retinopathy 05/02/2018    Mixed hyperlipidemia 4/9/2018    Myalgia due to statin 9/7/2022    Normocytic anemia 1/14/2022    NS (nuclear sclerosis), bilateral 12/10/2019    PNA (pneumonia) 5/28/2019    Postoperative hypothyroidism     Renovascular hypertension 4/9/2018    S/P kyphoplasty 8/14/2019     8/14/19: L2 kyphoplasty with Dr. Kwon     Slow transit constipation 5/27/2019    Type 2 diabetes mellitus with circulatory disorder, with long-term current use of insulin 4/9/2018    Type 2 diabetes mellitus with stage 3 chronic kidney disease, without long-term current use of insulin 4/9/2018    Type 2 diabetes mellitus with stage 3a chronic kidney disease, without long-term current use of insulin 3/3/2021    Type 2 diabetes mellitus with stage 3b chronic kidney disease, without long-term current use of insulin 9/7/2022     Past Surgical History:   Procedure Laterality Date    BACK SURGERY  2017    CHOLECYSTECTOMY  2015    [facility]    COLONOSCOPY  11/13/2015    Dr Chino torres prep. internal hemorrhoids. diverticulosis of sigmois. NO polyps. repeat due 2025    FIXATION KYPHOPLASTY Bilateral 08/14/2019    Procedure: L2 kyphoplasty Fluoro Globus;  Surgeon: Jeremie Kwon DO;  Location: [facility];  Service: Neurosurgery;  Laterality: Bilateral;  GLOBUS MACRINA WINSLOW [phone] TEXTED HER @ 10:54AM ON 7-30-19  PRE-OP-BY RN  8-7-2019    HYSTERECTOMY      OOPHORECTOMY  1996    one    THYROIDECTOMY  2016    at [facility] for [facility] nodule    TONSILLECTOMY       Family History   Problem Relation Age of Onset    Diabetes type II Mother      Social History     Tobacco Use    Smoking status: Never    Smokeless tobacco: Never   Substance Use Topics    Alcohol use: Yes     Alcohol/week: 4.0 standard drinks     Types: 4 Cans of beer per week     Comment: weekly    Drug use: No     Review of patient's allergies indicates:   Allergen Reactions    Mushroom Hives    Bactrim [sulfamethoxazole-trimethoprim] Hives    Codeine Hives    Gabapentin      Pruritis     Iodine     Peanut butter flavor     Penicillins Hives    Shellfish containing products Itching    Sulfa (sulfonamide antibiotics) Hives       Medications: I have reviewed the current medication administration record.    (Not in a hospital admission)      Review of Systems    Constitutional:  Negative for chills and fever.   HENT:  Negative for drooling and rhinorrhea.    Eyes:  Negative for pain and visual disturbance.   Respiratory:  Negative for cough, choking and shortness of breath.    Cardiovascular:  Negative for chest pain.   Gastrointestinal:  Negative for diarrhea and vomiting.   Genitourinary:  Negative for difficulty urinating.   Musculoskeletal:  Positive for back pain.   Neurological:  Positive for dizziness, weakness, numbness and headaches. Negative for speech difficulty.   Psychiatric/Behavioral:  Negative for agitation, behavioral problems and confusion.    Objective:     Vital Signs (Most Recent):  Temp: 98.6 °F (37 °C) (09/11/22 1917)  Pulse: 80 (09/11/22 1917)  Resp: 16 (09/11/22 1917)  BP: (!) 185/98 (09/11/22 1917)  SpO2: 100 % (09/11/22 1917)    Vital Signs Range (Last 24H):  Temp:  [98.6 °F (37 °C)]   Pulse:  [80]   Resp:  [16]   BP: (185)/(98)   SpO2:  [100 %]     Physical Exam  Vitals and nursing note reviewed.   Constitutional:       General: She is not in acute distress.     Appearance: She is obese. She is not diaphoretic.   HENT:      Head: Normocephalic and atraumatic.      Right Ear: External ear normal.      Left Ear: External ear normal.      Nose: No rhinorrhea.   Eyes:      General: No visual field deficit or scleral icterus.        Right eye: No discharge.         Left eye: No discharge.      Extraocular Movements: Extraocular movements intact.   Cardiovascular:      Rate and Rhythm: Normal rate.   Pulmonary:      Effort: Pulmonary effort is normal. No respiratory distress.   Abdominal:      Palpations: Abdomen is soft.   Musculoskeletal:         General: No tenderness.      Cervical back: Normal range of motion.      Right lower leg: Edema present.      Left lower leg: Edema present.   Skin:     General: Skin is warm and dry.   Neurological:      Mental Status: She is alert and oriented to person, place, and time.      GCS: GCS eye subscore is 4.  GCS verbal subscore is 5. GCS motor subscore is 6.      Cranial Nerves: No cranial nerve deficit, dysarthria or facial asymmetry.      Sensory: Sensory deficit present.      Motor: Weakness present. No pronator drift.   Psychiatric:         Behavior: Behavior is cooperative.       Neurological Exam:   LOC: alert  Attention Span: Good   Language: No aphasia  Articulation: No dysarthria  Orientation: Person, Place, Time   Visual Fields: Full  EOM (CN III, IV, VI): Full/intact  Facial Sensation (CN V): Facial sensory loss  Facial Movement (CN VII): Symmetric facial expression    Motor: Arm left  Normal 5/5  Leg left  Paresis: 2/5  Arm right  Normal 5/5  Leg right Normal 5/5  Sensation: Rick-hypoesthesia left      Laboratory:  CMP: No results for input(s): GLUCOSE, CALCIUM, ALBUMIN, PROT, NA, K, CO2, CL, BUN, CREATININE, ALKPHOS, ALT, AST, BILITOT in the last 24 hours.  CBC:   Recent Labs   Lab 09/07/22  1047   WBC 12.70   RBC 3.80*   HGB 10.6*   HCT 32.4*      MCV 85   MCH 27.9   MCHC 32.7     Lipid Panel:   Recent Labs   Lab 09/07/22  1047   CHOL 192   LDLCALC 103.0   HDL 65   TRIG 120     Coagulation: No results for input(s): PT, INR, APTT in the last 168 hours.  Hgb A1C:   Recent Labs   Lab 09/07/22  1047   HGBA1C 6.8*     TSH:   Recent Labs   Lab 09/07/22  1047   .620*       Diagnostic Results:      Brain imaging:  MRI brain without contrast pending     CT 9/11/22   No evidence of acute intracranial pathology, specifically no evidence of acute large vascular territory infarct or intracranial hemorrhage.    Vessel Imaging:  MRA Brain/Neck without contrast pending       Greg Wheat PA-C  [facility]  Department of Vascular Neurology   [facility] - Emergency Dept

## 2022-09-12 NOTE — PLAN OF CARE
[facility] - Neurosurgery ([facility])  Initial Discharge Assessment       Primary Care Provider: Elan Price MD    Admission Diagnosis: Chronic diastolic heart failure [I50.32]  Stroke [I63.9]  Chest pain [R07.9]    Admission Date: 9/11/2022  Expected Discharge Date: 9/13/2022    SW met with patient at bedside for Discharge Planning Assessment.  Per patient, patient lives alone in a single story home with one step(s) to enter.   Per patient, patient was independent with ADLs and used no dme for ambulation prior to admit.  Patient does not attend a coumadin clinic and is not on dialysis.     Pt reported that she has a nephrology appointment scheduled on 9/20 with Dr. Kimball with [facility].     Discharge Planning Booklet given to patient and discussed.  All questions addressed.     SW/CM to follow and assist with d/c needs as needed.    Discharge Barriers Identified: None    Payor: [employer] / Plan: [employer] TOTAL CARE / Product Type: HMO /     Extended Emergency Contact Information  Primary Emergency Contact: Miles Quezada  Address: [address] [place] [address] [facility]  Home Phone: [phone]  Relation: Son  Secondary Emergency Contact: Mike Quezada   [facility]  Home Phone: [phone]  Relation: Daughter    Discharge Plan A: Home with family  Discharge Plan B: Home Health      [facility]  [address]  [facility] [address]  Phone: [phone] Fax: [phone]    [id number] [address] AT [facility]  [address]  [facility] [address]  Phone: [phone] Fax: [phone]      Initial Assessment (most recent)       Adult Discharge Assessment - 09/12/22 1150          Discharge Assessment    Assessment Type Discharge Planning Assessment     Confirmed/corrected address, phone number and insurance Yes     Confirmed Demographics Correct on Facesheet     Source of Information  patient     Communicated LINDSYE with patient/caregiver Date not available/Unable to determine     Reason For Admission Headache     Lives With alone     Facility Arrived From: home     Do you have help at home or someone to help you manage your care at home? Yes     Who are your caregiver(s) and their phone number(s)? carlos Aquino [phone]     Prior to hospitilization cognitive status: Unable to Assess     Current cognitive status: Alert/Oriented     Walking or Climbing Stairs Difficulty none     Dressing/Bathing Difficulty none     Home Accessibility stairs to enter home     Number of Stairs, Main Entrance one     Home Layout Able to live on 1st floor     Equipment Currently Used at Home none     Do you currently have service(s) that help you manage your care at home? No     Do you have prescription coverage? Yes     How do you get to doctors appointments? family or friend will provide     Are you on dialysis? No     Do you take coumadin? No     Discharge Plan A Home with family     Discharge Plan B Home Health     Discharge Plan discussed with: Patient     Discharge Barriers Identified None                          Nelda Hyman, MARIEL  [id number]

## 2022-09-12 NOTE — PROGRESS NOTES
Cj Dill - Neurosurgery ([facility])  Vascular Neurology  Comprehensive Stroke Center  Progress Note    Assessment/Plan:     * Headache  Patient is a 62y F with PMH of hypothyroidism, HTN, HLD, CKD, Factor XI deficiency, prior stroke, obesity, and DM. Patient presented to the ED with HA, left sided numbness and LLE weakness that she noticed when she woke up. She is not a tpa candidate, OOW. Symptoms at this time not suggestive of LVO.     -MRI negative for acute infarct. MRA with ~50% L ICA stenosis. Consider adding ASA 81 to medication regimen for L ICA stenosis and other vascular risk factors. Per discussion with staff, consider obtaining ESR and CRP for GCA w/u as patient complaining of throbbing left sided neck pain +headache. Patient with improvement in headache today(2-3/10). Mild LLE weakness when compared to the right.  -Thank you for your consult. We will sign off at this time. Please do not hesitate to contact us for any questions or concerns.      History of stroke  Stroke in 2017   No additional info per chart review   Patient reports HA and dizziness and weakness with this stroke   Denies residual deficits   Reports not on AP/AC due to Factor XI deficiency     Weakness of left lower extremity  Presented with LLE weakness, this has improved  See headache    Left sided numbness  See HA     Type 2 diabetes mellitus with stage 4 chronic kidney disease, without long-term current use of insulin  Stroke RF   A1c 6.8   IP glucose goal 140-180 if admitted     Class 2 severe obesity due to excess calories with serious comorbidity and body mass index (BMI) of 35.0 to 35.9 in adult   on diet and exercise   Stroke RF     Chronic diastolic heart failure  Echo with EF 65%,severe LAE.    CKD (chronic kidney disease) stage 4, GFR 15-29 ml/min  GFR 14 on 9/7/22   Contraindication for CTA in this lower acuity setting, so MRA was obtained      Mixed hyperlipidemia  Stroke RF  LDL on 9/7/22 103   No statin at home ,  Consider adding  statin if able to tolerate     Renovascular hypertension  Stroke RF    on arrival   SBP <180    Postoperative hypothyroidism  On synthroid at home, okay to continue          9/12/2022: MRI negative for acute infarct. MRA with L ICA stenosis. Consider adding ASA 81 to medication regimen. Patient with improvement in headache(2-3/10). Mild LLE weakness when compared to the right.      STROKE DOCUMENTATION   Acute Stroke Times   Last Known Normal Date: 09/11/22  Unknown Normal Time: Unknown Time  Symptom Onset Date: 09/11/22  Unknown Symptom Onset Date: Unknown Date  Symptom Onset Time: 0500  Stroke Team Called Date: 09/11/22  Stroke Team Called Time: 1942  Stroke Team Arrival Date: 09/11/22  Stroke Team Arrival Time: 1944  CT Interpretation Time: 1957  Alteplase Recommended: No  CTA Interpretation Time:  (unable to obtain due to GFR of 14, not on dialysis )  Thrombectomy Recommended: No    NIH Scale:  1a. Level of Consciousness: 0-->Alert, keenly responsive  1b. LOC Questions: 0-->Answers both questions correctly  1c. LOC Commands: 0-->Performs both tasks correctly  2. Best Gaze: 0-->Normal  3. Visual: 0-->No visual loss  4. Facial Palsy: 0-->Normal symmetrical movements  5a. Motor Arm, Left: 0-->No drift, limb holds 90 (or 45) degrees for full 10 secs  5b. Motor Arm, Right: 0-->No drift, limb holds 90 (or 45) degrees for full 10 secs  6a. Motor Leg, Left: 1-->Drift, leg falls by the end of the 5-sec period but does not hit bed  6b. Motor Leg, Right: 0-->No drift, leg holds 30 degree position for full 5 secs  7. Limb Ataxia: 0-->Absent  8. Sensory: 0-->Normal, no sensory loss  9. Best Language: 0-->No aphasia, normal  10. Dysarthria: 0-->Normal  11. Extinction and Inattention (formerly Neglect): 0-->No abnormality  Total (NIH Stroke Scale): 1       Modified Bianka Score: 0  Chazy Coma Scale:    ABCD2 Score:    [id number] Score:   HAS -BLED Score:   ICH Score:   Hunt & Ortega Classification:       Hemorrhagic change of an Ischemic Stroke: Does this patient have an ischemic stroke with hemorrhagic changes? No     Neurologic Chief Complaint: LSW    Subjective:     Interval History: Patient is seen for follow-up neurological assessment and treatment recommendations:     MRI negative for acute infarct. MRA with L ICA stenosis. Consider adding ASA 81 to medication regimen. Patient with improvement in headache(2-3/10). Mild LLE weakness when compared to the right.    HPI, Past Medical, Family, and Social History remains the same as documented in the initial encounter.     Review of Systems   Constitutional:  Negative for fever.   HENT:  Negative for trouble swallowing.    Respiratory:  Negative for shortness of breath.    Cardiovascular:  Negative for chest pain.   Gastrointestinal:  Negative for vomiting.   Genitourinary:  Negative for difficulty urinating.   Musculoskeletal:  Positive for neck pain (left sided).   Skin:  Negative for rash.   Neurological:  Positive for numbness (L hand) and headaches.   Psychiatric/Behavioral:  Negative for confusion.    Scheduled Meds:   amLODIPine  10 mg Oral Daily    carvediloL  25 mg Oral BID WM    cholecalciferol (vitamin D3)  5,000 Units Oral Daily    EScitalopram oxalate  10 mg Oral Daily    heparin (porcine)  5,000 Units Subcutaneous Q8H    levothyroxine  200 mcg Oral Before breakfast     Continuous Infusions:  PRN Meds:acetaminophen, acetaminophen, albuterol-ipratropium, aluminum-magnesium hydroxide-simethicone, bisacodyL, carboxymethylcellulose sodium, dextrose 10%, dextrose 10%, glucagon (human recombinant), glucose, glucose, hydrALAZINE, melatonin, naloxone, ondansetron, polyethylene glycol, prochlorperazine, simethicone, sodium chloride 0.9%, traZODone    Objective:     Vital Signs (Most Recent):  Temp: 97.9 °F (36.6 °C) (09/12/22 1351)  Pulse: 77 (09/12/22 1201)  Resp: 16 (09/12/22 1201)  BP: 139/80 (09/12/22 1201)  SpO2: 97 % (09/12/22 1201)  BP Location: Left  arm    Vital Signs Range (Last 24H):  Temp:  [96.1 °F (35.6 °C)-98.6 °F (37 °C)]   Pulse:  [74-81]   Resp:  [16-18]   BP: (139-185)/()   SpO2:  [95 %-100 %]   BP Location: Left arm    Physical Exam  Vitals and nursing note reviewed.   Constitutional:       General: She is not in acute distress.  HENT:      Head: Normocephalic and atraumatic.      Right Ear: External ear normal.      Left Ear: External ear normal.      Nose: Nose normal.      Mouth/Throat:      Mouth: Mucous membranes are moist.   Eyes:      General: No scleral icterus.     Extraocular Movements: Extraocular movements intact.      Conjunctiva/sclera: Conjunctivae normal.   Cardiovascular:      Rate and Rhythm: Normal rate.   Pulmonary:      Effort: Pulmonary effort is normal. No respiratory distress.   Musculoskeletal:      Right lower leg: No edema.      Left lower leg: No edema.   Skin:     General: Skin is warm and dry.   Neurological:      Mental Status: She is alert and oriented to person, place, and time.      Sensory: No sensory deficit.   Psychiatric:         Mood and Affect: Mood normal.         Behavior: Behavior normal.       Neurological Exam:   LOC: alert  Attention Span: Good   Language: No aphasia  Articulation: No dysarthria  Orientation: Person, Place, Time   Visual Fields: Full  EOM (CN III, IV, VI): Full/intact  Facial Movement (CN VII): Symmetric facial expression    Motor: Arm left  Normal 5/5  Leg left  4/5  Arm right  Normal 5/5  Leg right Normal 5/5    Laboratory:  CMP:   Recent Labs   Lab 09/11/22 2106 09/12/22 0726   CALCIUM 7.6* 8.0*   ALBUMIN 3.4*  --    PROT 7.2  --    * 139   K 5.6* 4.3   CO2 18* 22*    108   BUN 34* 33*   CREATININE 3.8* 3.6*   ALKPHOS 114  --    ALT 16  --    AST 27  --    BILITOT 0.4  --      CBC:   Recent Labs   Lab 09/12/22  0726   WBC 11.83   RBC 3.75*   HGB 10.6*   HCT 31.9*      MCV 85   MCH 28.3   MCHC 33.2     Lipid Panel:   Recent Labs   Lab 09/11/22 2106   CHOL 180    LDLCALC 93.0   HDL 58   TRIG 145     Coagulation:   Recent Labs   Lab 09/11/22  2106   INR 1.1     Platelet Aggregation Study: No results for input(s): PLTAGG, PLTAGINTERP, PLTAGREGLACO, ADPPLTAGGREG in the last 168 hours.  Hgb A1C:   Recent Labs   Lab 09/07/22  1047   HGBA1C 6.8*     TSH:   Recent Labs   Lab 09/11/22 2106   TSH 43.212*       Diagnostic Results     Brain imaging:  MRI brain without contrast 9/11/2022   1. No acute intracranial abnormality.  2. Chronic microvascular ischemic changes and multiple remote lacunar infarctions as detailed above.      CTH 9/11/22   No evidence of acute intracranial pathology, specifically no evidence of acute large vascular territory infarct or intracranial hemorrhage.     Vessel Imaging:  MRA Brain/Neck without contrast 9/11/22  Approximate 50% stenosis of the left proximal ICA.  Otherwise unremarkable MRA neck.  1. No acute intracranial abnormality.  2. Chronic microvascular ischemic changes and multiple remote lacunar infarctions as detailed above.  3. MRA brain demonstrates no high-grade stenosis or occlusion of the proximal intracranial arteries.    Cardiac Imaging   TTE 9/12/22  Severe left atrial enlargement.  The left ventricle is normal in size with mild eccentric hypertrophy and normal systolic function.  The estimated ejection fraction is 65%.  Indeterminate left ventricular diastolic function.  Normal right ventricular size with normal right ventricular systolic function.  Mild mitral regurgitation.  Normal central venous pressure (3 mmHg).      Alka Dunlap PA-C  Comprehensive Stroke Center  Department of Vascular Neurology   [facility])       Palmiar Leach

## 2022-09-12 NOTE — ED NOTES
MRI notified of pt new onset change in symptoms. Stated will be ready in appx 30 minutes. Sessions, MD at bedside for neuro re-eval

## 2022-09-12 NOTE — SUBJECTIVE & OBJECTIVE
Past Medical History:   Diagnosis Date    Acute respiratory failure with hypoxia and hypercapnia 5/26/2019    ATN (acute tubular necrosis) 2/8/2019    Chronic diastolic heart failure 2/7/2019 2-8-19 Mild left atrial enlargement. Mild left ventricular enlargement. Normal left ventricular systolic function. The estimated ejection fraction is 55% Indeterminate left ventricular diastolic function. Normal right ventricular systolic function. Mild mitral regurgitation. Mild to moderate tricuspid regurgitation. The estimated PA systolic pressure is 32 mm Hg Normal central venous pressure (3 m    CKD (chronic kidney disease) stage 4, GFR 15-29 ml/min 5/16/2018    CKD stage G3a/A3, GFR 45-59 and albumin creatinine ratio >300 mg/g 5/16/2018    CKD stage G3b/A3, GFR 30-44 and albumin creatinine ratio >300 mg/g 5/16/2018    Closed compression fracture of L2 lumbar vertebra 5/24/2019    Closed compression fracture of L2 lumbar vertebra 5/24/2019     IMO Regulatory Load October 2019    Controlled type 2 diabetes with retinopathy 4/9/2018    COVID-19 virus infection 7-1-2021 7/1/2021    CVA (cerebral vascular accident) 2017    Diabetic polyneuropathy associated with type 2 diabetes mellitus 12/4/2020    Essential hypertension 4/9/2018    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  7-31-19 Factor XI Activity 55 - 145 % 96       Gastroesophageal reflux disease without esophagitis 5/24/2019    Generalized anxiety disorder 11/8/2019    GERD (gastroesophageal reflux disease)     GIB (gastrointestinal bleeding) 10/18/2018    History of hepatitis C     s/p succesful Rx w/ Harv[facility]  - [id number] (cure) 2016    Hypertensive retinopathy, bilateral 12/10/2019    Mild nonproliferative diabetic retinopathy 05/02/2018    Mixed hyperlipidemia 4/9/2018    Myalgia due to statin 9/7/2022    Normocytic anemia 1/14/2022    NS (nuclear sclerosis), bilateral 12/10/2019    PNA (pneumonia) 5/28/2019    Postoperative hypothyroidism      Renovascular hypertension 4/9/2018    S/P kyphoplasty 8/14/2019 8/14/19: L2 kyphoplasty with Dr. Kwon     Slow transit constipation 5/27/2019    Type 2 diabetes mellitus with circulatory disorder, with long-term current use of insulin 4/9/2018    Type 2 diabetes mellitus with stage 3 chronic kidney disease, without long-term current use of insulin 4/9/2018    Type 2 diabetes mellitus with stage 3a chronic kidney disease, without long-term current use of insulin 3/3/2021    Type 2 diabetes mellitus with stage 3b chronic kidney disease, without long-term current use of insulin 9/7/2022     Past Surgical History:   Procedure Laterality Date    BACK SURGERY  2017    CHOLECYSTECTOMY  2015    [facility]    COLONOSCOPY  11/13/2015    Dr Chino torres prep. internal hemorrhoids. diverticulosis of sigmois. NO polyps. repeat due 2025    FIXATION KYPHOPLASTY Bilateral 08/14/2019    Procedure: L2 kyphoplasty Fluoro Globus;  Surgeon: Jeremie Kwon DO;  Location: [facility];  Service: Neurosurgery;  Laterality: Bilateral;  GLOBUS MACRINA WINSLOW [phone] TEXTED HER @ 10:54AM ON 7-30-19  PRE-OP-BY RN  8-7-2019    HYSTERECTOMY      OOPHORECTOMY  1996    one    THYROIDECTOMY  2016    at [facility] for thyroid nodule    TONSILLECTOMY       Family History   Problem Relation Age of Onset    Diabetes type II Mother      Social History     Tobacco Use    Smoking status: Never    Smokeless tobacco: Never   Substance Use Topics    Alcohol use: Yes     Alcohol/week: 4.0 standard drinks     Types: 4 Cans of beer per week     Comment: weekly    Drug use: No     Review of patient's allergies indicates:   Allergen Reactions    Mushroom Hives    Bactrim [sulfamethoxazole-trimethoprim] Hives    Codeine Hives    Gabapentin      Pruritis     Iodine     Peanut butter flavor     Penicillins Hives    Shellfish containing products Itching    Sulfa (sulfonamide antibiotics) Hives       Medications: I have reviewed the current medication administration  record.    (Not in a hospital admission)      Review of Systems   Constitutional:  Negative for chills and fever.   HENT:  Negative for drooling and rhinorrhea.    Eyes:  Negative for pain and visual disturbance.   Respiratory:  Negative for cough, choking and shortness of breath.    Cardiovascular:  Negative for chest pain.   Gastrointestinal:  Negative for diarrhea and vomiting.   Genitourinary:  Negative for difficulty urinating.   Musculoskeletal:  Positive for back pain.   Neurological:  Positive for dizziness, weakness, numbness and headaches. Negative for speech difficulty.   Psychiatric/Behavioral:  Negative for agitation, behavioral problems and confusion.    Objective:     Vital Signs (Most Recent):  Temp: 98.6 °F (37 °C) (09/11/22 1917)  Pulse: 80 (09/11/22 1917)  Resp: 16 (09/11/22 1917)  BP: (!) 185/98 (09/11/22 1917)  SpO2: 100 % (09/11/22 1917)    Vital Signs Range (Last 24H):  Temp:  [98.6 °F (37 °C)]   Pulse:  [80]   Resp:  [16]   BP: (185)/(98)   SpO2:  [100 %]     Physical Exam  Vitals and nursing note reviewed.   Constitutional:       General: She is not in acute distress.     Appearance: She is obese. She is not diaphoretic.   HENT:      Head: Normocephalic and atraumatic.      Right Ear: External ear normal.      Left Ear: External ear normal.      Nose: No rhinorrhea.   Eyes:      General: No visual field deficit or scleral icterus.        Right eye: No discharge.         Left eye: No discharge.      Extraocular Movements: Extraocular movements intact.   Cardiovascular:      Rate and Rhythm: Normal rate.   Pulmonary:      Effort: Pulmonary effort is normal. No respiratory distress.   Abdominal:      Palpations: Abdomen is soft.   Musculoskeletal:         General: No tenderness.      Cervical back: Normal range of motion.      Right lower leg: Edema present.      Left lower leg: Edema present.   Skin:     General: Skin is warm and dry.   Neurological:      Mental Status: She is alert and  oriented to person, place, and time.      GCS: GCS eye subscore is 4. GCS verbal subscore is 5. GCS motor subscore is 6.      Cranial Nerves: No cranial nerve deficit, dysarthria or facial asymmetry.      Sensory: Sensory deficit present.      Motor: Weakness present. No pronator drift.   Psychiatric:         Behavior: Behavior is cooperative.       Neurological Exam:   LOC: alert  Attention Span: Good   Language: No aphasia  Articulation: No dysarthria  Orientation: Person, Place, Time   Visual Fields: Full  EOM (CN III, IV, VI): Full/intact  Facial Sensation (CN V): Facial sensory loss  Facial Movement (CN VII): Symmetric facial expression    Motor: Arm left  Normal 5/5  Leg left  Paresis: 2/5  Arm right  Normal 5/5  Leg right Normal 5/5  Sensation: Rick-hypoesthesia left      Laboratory:  CMP: No results for input(s): GLUCOSE, CALCIUM, ALBUMIN, PROT, NA, K, CO2, CL, BUN, CREATININE, ALKPHOS, ALT, AST, BILITOT in the last 24 hours.  CBC:   Recent Labs   Lab 09/07/22  1047   WBC 12.70   RBC 3.80*   HGB 10.6*   HCT 32.4*      MCV 85   MCH 27.9   MCHC 32.7     Lipid Panel:   Recent Labs   Lab 09/07/22  1047   CHOL 192   LDLCALC 103.0   HDL 65   TRIG 120     Coagulation: No results for input(s): PT, INR, APTT in the last 168 hours.  Hgb A1C:   Recent Labs   Lab 09/07/22  1047   HGBA1C 6.8*     TSH:   Recent Labs   Lab 09/07/22  1047   .620*       Diagnostic Results:      Brain imaging:  MRI brain without contrast pending     [facility] 9/11/22   No evidence of acute intracranial pathology, specifically no evidence of acute large vascular territory infarct or intracranial hemorrhage.    Vessel Imaging:  MRA Brain/Neck without contrast pending

## 2022-09-12 NOTE — HPI
Kathie Quezada is a 62 y.o. female with PMHx of HTN, HLD, CKD, diastolic HF, T2DM, stroke (2017) and obesity presents to the ED for possible stroke. Patient woke up at 5 AM with headache and left LE weakness. She returned to sleep, woke up at 8 AM and the headache had not improved. Patient took her blood pressure, noted it to be very high, contacted her doctor who instructed her to take her medications and continue monitoring it. She stated that the blood pressure would improve when she would lay down, however it would worsen when she got up to do any housework. In the afternoon, she began experiencing nausea, light headedness, chills, blurry vision and left sided facial numbness. She also noted that her speech was slower and slurred. Of note, patient stated her blood pressure has been uncontrolled since 9/7/2022. Upon arrival to the ED, stroke code was activated, was not a candidate for tPA. Vascular neurology was consulted, work-up was negative for stroke. Admitted for observation. Denies chest pain, dyspnea, vomiting, fever.    ED: /98, HR 81, afebrile, SpO2 100% on RA. CMP: Na 134, K 5.6, Cr 3.8, Glucose 145. TSH 43.212. Vascular neurology consulted.  CT Head: No evidence of acute intracranial pathology, specifically no evidence of acute large vascular territory infarct or intracranial hemorrhage.  MRI Brain: No acute intracranial abnormality  MRA Neck: Approximate 50% stenosis of the left proximal ICA.

## 2022-09-12 NOTE — ED NOTES
Unable to obtain PIV x 2 RN and ultrasound certified tech. Sessions, MD notified of possible need for EJ.

## 2022-09-12 NOTE — HPI
Ms. Quezada is a 62 year old female with PMH of hypothyroidism, HTN, HLD, CKD, Factor XI deficiency, prior stroke (no residual deficit), obesity, and DM. Patient presented to ED with HA, LSN and LLE weakness that she noticed when she woke up today. Patient reports having symptoms upon waking up at 5am today but does not recall when she went to bed normal last night. Patient laid back down but still had symptoms. She is not a tpa candidate, OOW. On initial exam, mild LSN (face, arm, and leg) and LLE weakness (antigravity but notably weaker). She c/o HA. No visual deficits, neglect, or speech deficits appreciated. Symptoms at this time not suggestive of LVO. Patient went for CTA MP but only received CTH due to GFR of 14 without being on dialysis. She will go for MRI brain and MRA brain/neck for further stroke evaluation.

## 2022-09-12 NOTE — ASSESSMENT & PLAN NOTE
Body mass index is 38.4 kg/m². Morbid obesity complicates all aspects of disease management from diagnostic modalities to treatment. Weight loss encouraged and health benefits explained to patient.

## 2022-09-12 NOTE — ED NOTES
Pt states new onset dysarthria with moderate headache. Some slurred speech noted. PERRLA intact and 3mm bilaterally. No other deficits noted or reported a this time.

## 2022-09-12 NOTE — PROVIDER PROGRESS NOTES - EMERGENCY DEPT.
Signout Note    I received signout from the previous provider.     Chief complaint:  Headache (Pt c/o dull HA that radiates down to L side of neck and hypertension. Hx of previous stroke )      Pertinent history &exam:  Kathie Quezada is a 62 y.o. female with pertinent PMH of hypertension, diabetes who presented to emergency department with complaint of left leg weakness and slurred speech.  She was a stroke code.  She did not receive tPA.  She was signed out pending MRI for Hospital admission.    Vitals:    09/12/22 0323   BP:    Pulse: 80   Resp:    Temp:        Imaging Studies:    MRA Neck without contrast   Final Result      Approximate 50% stenosis of the left proximal ICA.  Otherwise unremarkable MRA neck.         Electronically signed by: Tyler Medina   Date:    09/12/2022   Time:    00:09      MRI Brain Without Contrast   Final Result      1. No acute intracranial abnormality.   2. Chronic microvascular ischemic changes and multiple remote lacunar infarctions as detailed above.   3. MRA brain demonstrates no high-grade stenosis or occlusion of the proximal intracranial arteries.         Electronically signed by: Tyler Medina   Date:    09/11/2022   Time:    23:46      MRA Brain without contrast   Final Result      1. No acute intracranial abnormality.   2. Chronic microvascular ischemic changes and multiple remote lacunar infarctions as detailed above.   3. MRA brain demonstrates no high-grade stenosis or occlusion of the proximal intracranial arteries.         Electronically signed by: Tyler Mdeina   Date:    09/11/2022   Time:    23:46      CT Head Without Contrast   Final Result      No evidence of acute intracranial pathology, specifically no evidence of acute large vascular territory infarct or intracranial hemorrhage.      Electronically signed by resident: Filiberto Kimball   Date:    09/11/2022   Time:    19:58      Electronically signed by: Phani Hinojosa MD   Date:    09/11/2022    Time:    20:14          Medications Given:  Medications   acetaminophen tablet 1,000 mg (1,000 mg Oral Not Given 9/11/22 2147)   sodium chloride 0.9% flush 5 mL (has no administration in time range)   albuterol-ipratropium 2.5 mg-0.5 mg/3 mL nebulizer solution 3 mL (has no administration in time range)   melatonin tablet 6 mg (has no administration in time range)   ondansetron disintegrating tablet 8 mg (has no administration in time range)   prochlorperazine injection Soln 5 mg (has no administration in time range)   polyethylene glycol packet 17 g (has no administration in time range)   bisacodyL suppository 10 mg (has no administration in time range)   simethicone chewable tablet 80 mg (has no administration in time range)   aluminum-magnesium hydroxide-simethicone 200-200-20 mg/5 mL suspension 30 mL (has no administration in time range)   acetaminophen tablet 650 mg (has no administration in time range)   acetaminophen tablet 1,000 mg (has no administration in time range)   naloxone 0.4 mg/mL injection 0.02 mg (has no administration in time range)   glucose chewable tablet 16 g (has no administration in time range)   glucose chewable tablet 24 g (has no administration in time range)   glucagon (human recombinant) injection 1 mg (has no administration in time range)   dextrose 10% bolus 125 mL (has no administration in time range)   dextrose 10% bolus 250 mL (has no administration in time range)   heparin (porcine) injection 5,000 Units (has no administration in time range)   amLODIPine tablet 10 mg (has no administration in time range)   cholecalciferol (vitamin D3) 125 mcg (5,000 unit) tablet 5,000 Units (has no administration in time range)   furosemide tablet 20 mg (has no administration in time range)   carboxymethylcellulose sodium 1 % DpGe 1 drop (has no administration in time range)   traZODone tablet 50 mg (has no administration in time range)   levothyroxine tablet 200 mcg (has no administration in  time range)   EScitalopram oxalate tablet 10 mg (has no administration in time range)   hydrALAZINE tablet 25 mg (has no administration in time range)   carvediloL tablet 25 mg (25 mg Oral Given 9/12/22 7049)   metoclopramide HCl injection 10 mg (10 mg Intravenous Given 9/11/22 0382)       Pending Items/ MDM:  62 y.o. female with above complaint.  MRI without stroke.  Placed in observation to Internal Medicine.    Diagnostic Impression:    1. Stroke    2. Chest pain    3. Chronic diastolic heart failure         ED Disposition Condition    Observation              Kamala Pate MD  Emergency Medicine

## 2022-09-12 NOTE — SUBJECTIVE & OBJECTIVE
Neurologic Chief Complaint: LSW    Subjective:     Interval History: Patient is seen for follow-up neurological assessment and treatment recommendations:     MRI negative for acute infarct. MRA with L ICA stenosis. Consider adding ASA 81 to medication regimen. Patient with improvement in headache(2-3/10). Mild LLE weakness when compared to the right.    HPI, Past Medical, Family, and Social History remains the same as documented in the initial encounter.     Review of Systems   Constitutional:  Negative for fever.   HENT:  Negative for trouble swallowing.    Respiratory:  Negative for shortness of breath.    Cardiovascular:  Negative for chest pain.   Gastrointestinal:  Negative for vomiting.   Genitourinary:  Negative for difficulty urinating.   Musculoskeletal:  Positive for neck pain (left sided).   Skin:  Negative for rash.   Neurological:  Positive for numbness (L hand) and headaches.   Psychiatric/Behavioral:  Negative for confusion.    Scheduled Meds:   amLODIPine  10 mg Oral Daily    carvediloL  25 mg Oral BID WM    cholecalciferol (vitamin D3)  5,000 Units Oral Daily    EScitalopram oxalate  10 mg Oral Daily    heparin (porcine)  5,000 Units Subcutaneous Q8H    levothyroxine  200 mcg Oral Before breakfast     Continuous Infusions:  PRN Meds:acetaminophen, acetaminophen, albuterol-ipratropium, aluminum-magnesium hydroxide-simethicone, bisacodyL, carboxymethylcellulose sodium, dextrose 10%, dextrose 10%, glucagon (human recombinant), glucose, glucose, hydrALAZINE, melatonin, naloxone, ondansetron, polyethylene glycol, prochlorperazine, simethicone, sodium chloride 0.9%, traZODone    Objective:     Vital Signs (Most Recent):  Temp: 97.9 °F (36.6 °C) (09/12/22 1351)  Pulse: 77 (09/12/22 1201)  Resp: 16 (09/12/22 1201)  BP: 139/80 (09/12/22 1201)  SpO2: 97 % (09/12/22 1201)  BP Location: Left arm    Vital Signs Range (Last 24H):  Temp:  [96.1 °F (35.6 °C)-98.6 °F (37 °C)]   Pulse:  [74-81]   Resp:  [16-18]   BP:  (139-185)/()   SpO2:  [95 %-100 %]   BP Location: Left arm    Physical Exam  Vitals and nursing note reviewed.   Constitutional:       General: She is not in acute distress.  HENT:      Head: Normocephalic and atraumatic.      Right Ear: External ear normal.      Left Ear: External ear normal.      Nose: Nose normal.      Mouth/Throat:      Mouth: Mucous membranes are moist.   Eyes:      General: No scleral icterus.     Extraocular Movements: Extraocular movements intact.      Conjunctiva/sclera: Conjunctivae normal.   Cardiovascular:      Rate and Rhythm: Normal rate.   Pulmonary:      Effort: Pulmonary effort is normal. No respiratory distress.   Musculoskeletal:      Right lower leg: No edema.      Left lower leg: No edema.   Skin:     General: Skin is warm and dry.   Neurological:      Mental Status: She is alert and oriented to person, place, and time.      Sensory: No sensory deficit.   Psychiatric:         Mood and Affect: Mood normal.         Behavior: Behavior normal.       Neurological Exam:   LOC: alert  Attention Span: Good   Language: No aphasia  Articulation: No dysarthria  Orientation: Person, Place, Time   Visual Fields: Full  EOM (CN III, IV, VI): Full/intact  Facial Movement (CN VII): Symmetric facial expression    Motor: Arm left  Normal 5/5  Leg left  4/5  Arm right  Normal 5/5  Leg right Normal 5/5    Laboratory:  CMP:   Recent Labs   Lab 09/11/22 2106 09/12/22  0726   CALCIUM 7.6* 8.0*   ALBUMIN 3.4*  --    PROT 7.2  --    * 139   K 5.6* 4.3   CO2 18* 22*    108   BUN 34* 33*   CREATININE 3.8* 3.6*   ALKPHOS 114  --    ALT 16  --    AST 27  --    BILITOT 0.4  --      CBC:   Recent Labs   Lab 09/12/22  0726   WBC 11.83   RBC 3.75*   HGB 10.6*   HCT 31.9*      MCV 85   MCH 28.3   MCHC 33.2     Lipid Panel:   Recent Labs   Lab 09/11/22 2106   CHOL 180   LDLCALC 93.0   HDL 58   TRIG 145     Coagulation:   Recent Labs   Lab 09/11/22 2106   INR 1.1     Platelet Aggregation  Study: No results for input(s): PLTAGG, PLTAGINTERP, PLTAGREGLACO, ADPPLTAGGREG in the last 168 hours.  Hgb A1C:   Recent Labs   Lab 09/07/22  1047   HGBA1C 6.8*     TSH:   Recent Labs   Lab 09/11/22  2106   TSH 43.212*       Diagnostic Results     Brain imaging:  MRI brain without contrast 9/11/2022   1. No acute intracranial abnormality.  2. Chronic microvascular ischemic changes and multiple remote lacunar infarctions as detailed above.      CTH 9/11/22   No evidence of acute intracranial pathology, specifically no evidence of acute large vascular territory infarct or intracranial hemorrhage.     Vessel Imaging:  MRA Brain/Neck without contrast 9/11/22  Approximate 50% stenosis of the left proximal ICA.  Otherwise unremarkable MRA neck.  1. No acute intracranial abnormality.  2. Chronic microvascular ischemic changes and multiple remote lacunar infarctions as detailed above.  3. MRA brain demonstrates no high-grade stenosis or occlusion of the proximal intracranial arteries.    Cardiac Imaging   TTE 9/12/22  Severe left atrial enlargement.  The left ventricle is normal in size with mild eccentric hypertrophy and normal systolic function.  The estimated ejection fraction is 65%.  Indeterminate left ventricular diastolic function.  Normal right ventricular size with normal right ventricular systolic function.  Mild mitral regurgitation.  Normal central venous pressure (3 mmHg).

## 2022-09-12 NOTE — ASSESSMENT & PLAN NOTE
- Per patient, home diabetes medications discontinued by PCP due to CKD  - Monitor blood glucose with routine POCT     Last A1c reviewed-   Lab Results   Component Value Date    HGBA1C 6.8 (H) 09/07/2022

## 2022-09-12 NOTE — ASSESSMENT & PLAN NOTE
Antigravity   Falls to bed before counting to 5   Reports some pain with this action as well   See HA

## 2022-09-12 NOTE — ASSESSMENT & PLAN NOTE
Left sided numbness  History of Stroke    - Vascular neurology noted antigravity, LLE falls to bed before counting to 5  - Consult to follow up with further recommendations

## 2022-09-12 NOTE — ASSESSMENT & PLAN NOTE
Patient is a 62y F with PMH of hypothyroidism, HTN, HLD, CKD, Factor XI deficiency, prior stroke, obesity, and DM. Patient presented to the ED with HA, left sided numbness and LLE weakness that she noticed when she woke up. She is not a tpa candidate, OOW. Symptoms at this time not suggestive of LVO.     -MRI negative for acute infarct. MRA with L ICA stenosis. Consider adding ASA 81 to medication regimen for L ICA stenosis and other vascular risk factors. Per discussion with staff, consider obtaining ESR and CRP for GCA w/u as patient complaining of throbbing left sided neck pain +headache. Patient with improvement in headache today(2-3/10). Mild LLE weakness when compared to the right.  -Thank you for your consult. We will sign off at this time.

## 2022-09-13 VITALS
TEMPERATURE: 99 F | BODY MASS INDEX: 38.51 KG/M2 | DIASTOLIC BLOOD PRESSURE: 66 MMHG | WEIGHT: 260 LBS | HEIGHT: 69 IN | HEART RATE: 79 BPM | SYSTOLIC BLOOD PRESSURE: 147 MMHG | RESPIRATION RATE: 17 BRPM | OXYGEN SATURATION: 100 %

## 2022-09-13 LAB
ANION GAP SERPL CALC-SCNC: 11 MMOL/L (ref 8–16)
BASOPHILS # BLD AUTO: 0.06 K/UL (ref 0–0.2)
BASOPHILS NFR BLD: 0.6 % (ref 0–1.9)
BUN SERPL-MCNC: 38 MG/DL (ref 6–20)
CALCIUM SERPL-MCNC: 7.9 MG/DL (ref 8.7–10.5)
CHLORIDE SERPL-SCNC: 109 MMOL/L (ref 95–110)
CO2 SERPL-SCNC: 20 MMOL/L (ref 23–29)
CREAT SERPL-MCNC: 3.6 MG/DL (ref 0.5–1.4)
DIFFERENTIAL METHOD: ABNORMAL
EOSINOPHIL # BLD AUTO: 0.2 K/UL (ref 0–0.5)
EOSINOPHIL NFR BLD: 1.9 % (ref 0–8)
ERYTHROCYTE [DISTWIDTH] IN BLOOD BY AUTOMATED COUNT: 14.6 % (ref 11.5–14.5)
EST. GFR  (NO RACE VARIABLE): 13.9 ML/MIN/1.73 M^2
GLUCOSE SERPL-MCNC: 125 MG/DL (ref 70–110)
HCT VFR BLD AUTO: 28.1 % (ref 37–48.5)
HGB BLD-MCNC: 9.4 G/DL (ref 12–16)
IMM GRANULOCYTES # BLD AUTO: 0.05 K/UL (ref 0–0.04)
IMM GRANULOCYTES NFR BLD AUTO: 0.5 % (ref 0–0.5)
LYMPHOCYTES # BLD AUTO: 2.9 K/UL (ref 1–4.8)
LYMPHOCYTES NFR BLD: 26.9 % (ref 18–48)
MAGNESIUM SERPL-MCNC: 1.9 MG/DL (ref 1.6–2.6)
MCH RBC QN AUTO: 27.6 PG (ref 27–31)
MCHC RBC AUTO-ENTMCNC: 33.5 G/DL (ref 32–36)
MCV RBC AUTO: 83 FL (ref 82–98)
MONOCYTES # BLD AUTO: 0.8 K/UL (ref 0.3–1)
MONOCYTES NFR BLD: 7 % (ref 4–15)
NEUTROPHILS # BLD AUTO: 6.8 K/UL (ref 1.8–7.7)
NEUTROPHILS NFR BLD: 63.1 % (ref 38–73)
NRBC BLD-RTO: 0 /100 WBC
PHOSPHATE SERPL-MCNC: 4.9 MG/DL (ref 2.7–4.5)
PLATELET # BLD AUTO: 106 K/UL (ref 150–450)
PMV BLD AUTO: 10.2 FL (ref 9.2–12.9)
POCT GLUCOSE: 153 MG/DL (ref 70–110)
POTASSIUM SERPL-SCNC: 4 MMOL/L (ref 3.5–5.1)
RBC # BLD AUTO: 3.4 M/UL (ref 4–5.4)
SODIUM SERPL-SCNC: 140 MMOL/L (ref 136–145)
WBC # BLD AUTO: 10.78 K/UL (ref 3.9–12.7)

## 2022-09-13 PROCEDURE — 85025 COMPLETE CBC W/AUTO DIFF WBC: CPT | Mod: HCNC

## 2022-09-13 PROCEDURE — 80048 BASIC METABOLIC PNL TOTAL CA: CPT | Mod: HCNC

## 2022-09-13 PROCEDURE — 36415 COLL VENOUS BLD VENIPUNCTURE: CPT | Mod: HCNC

## 2022-09-13 PROCEDURE — G0378 HOSPITAL OBSERVATION PER HR: HCPCS | Mod: HCNC

## 2022-09-13 PROCEDURE — 84100 ASSAY OF PHOSPHORUS: CPT | Mod: HCNC

## 2022-09-13 PROCEDURE — 99225 PR SUBSEQUENT OBSERVATION CARE,LEVEL II: ICD-10-PCS | Mod: HCNC,CS,, | Performed by: HOSPITALIST

## 2022-09-13 PROCEDURE — 99225 PR SUBSEQUENT OBSERVATION CARE,LEVEL II: CPT | Mod: HCNC,CS,, | Performed by: HOSPITALIST

## 2022-09-13 PROCEDURE — 83735 ASSAY OF MAGNESIUM: CPT | Mod: HCNC

## 2022-09-13 PROCEDURE — 25000003 PHARM REV CODE 250: Mod: HCNC | Performed by: HOSPITALIST

## 2022-09-13 PROCEDURE — 25000003 PHARM REV CODE 250: Mod: HCNC | Performed by: PHYSICIAN ASSISTANT

## 2022-09-13 PROCEDURE — 25000003 PHARM REV CODE 250: Mod: HCNC

## 2022-09-13 RX ORDER — CARVEDILOL 25 MG/1
25 TABLET ORAL 2 TIMES DAILY WITH MEALS
Qty: 180 TABLET | Refills: 3 | Status: SHIPPED | OUTPATIENT
Start: 2022-09-13 | End: 2022-09-19 | Stop reason: SDUPTHER

## 2022-09-13 RX ORDER — LEVOTHYROXINE SODIUM 200 UG/1
200 TABLET ORAL
Qty: 30 TABLET | Refills: 0 | Status: SHIPPED | OUTPATIENT
Start: 2022-09-13 | End: 2022-09-19 | Stop reason: SDUPTHER

## 2022-09-13 RX ORDER — NIFEDIPINE 30 MG/1
30 TABLET, EXTENDED RELEASE ORAL DAILY
Qty: 30 TABLET | Refills: 11 | Status: SHIPPED | OUTPATIENT
Start: 2022-09-13 | End: 2022-09-19

## 2022-09-13 RX ORDER — ATORVASTATIN CALCIUM 10 MG/1
10 TABLET, FILM COATED ORAL DAILY
Qty: 90 TABLET | Refills: 0 | Status: SHIPPED | OUTPATIENT
Start: 2022-09-13 | End: 2022-09-19

## 2022-09-13 RX ORDER — NIFEDIPINE 30 MG/1
30 TABLET, EXTENDED RELEASE ORAL DAILY
Status: DISCONTINUED | OUTPATIENT
Start: 2022-09-13 | End: 2022-09-13 | Stop reason: HOSPADM

## 2022-09-13 RX ORDER — ISOSORBIDE MONONITRATE 30 MG/1
30 TABLET, EXTENDED RELEASE ORAL DAILY
Status: DISCONTINUED | OUTPATIENT
Start: 2022-09-13 | End: 2022-09-13

## 2022-09-13 RX ORDER — NAPROXEN SODIUM 220 MG/1
81 TABLET, FILM COATED ORAL DAILY
Qty: 30 TABLET | Refills: 11 | Status: SHIPPED | OUTPATIENT
Start: 2022-09-13 | End: 2022-09-19

## 2022-09-13 RX ORDER — NAPROXEN SODIUM 220 MG/1
81 TABLET, FILM COATED ORAL DAILY
Status: DISCONTINUED | OUTPATIENT
Start: 2022-09-13 | End: 2022-09-13 | Stop reason: HOSPADM

## 2022-09-13 RX ORDER — FUROSEMIDE 40 MG/1
40 TABLET ORAL DAILY
Qty: 30 TABLET | Refills: 0 | Status: SHIPPED | OUTPATIENT
Start: 2022-09-13 | End: 2022-09-19 | Stop reason: SDUPTHER

## 2022-09-13 RX ORDER — ACETAMINOPHEN 500 MG
5000 TABLET ORAL DAILY
Qty: 90 TABLET | Refills: 3 | Status: SHIPPED | OUTPATIENT
Start: 2022-09-13 | End: 2023-01-04 | Stop reason: SDUPTHER

## 2022-09-13 RX ADMIN — ESCITALOPRAM OXALATE 10 MG: 10 TABLET ORAL at 09:09

## 2022-09-13 RX ADMIN — NIFEDIPINE 30 MG: 30 TABLET, FILM COATED, EXTENDED RELEASE ORAL at 09:09

## 2022-09-13 RX ADMIN — CARVEDILOL 25 MG: 25 TABLET, FILM COATED ORAL at 08:09

## 2022-09-13 RX ADMIN — LEVOTHYROXINE SODIUM 200 MCG: 100 TABLET ORAL at 06:09

## 2022-09-13 RX ADMIN — Medication 5000 UNITS: at 09:09

## 2022-09-13 RX ADMIN — ACETAMINOPHEN 1000 MG: 500 TABLET ORAL at 09:09

## 2022-09-13 NOTE — PLAN OF CARE
09/13/22 1335   Final Note   Assessment Type Final Discharge Note   Anticipated Discharge Disposition Home   What phone number can be called within the next 1-3 days to see how you are doing after discharge? [phone]   Hospital Resources/Appts/Education Provided Appointments scheduled and added to AVS   Post-Acute Status   Discharge Delays None known at this time     Patient's son to provide transport home    Alejandra Long RN Case Manager  [facility]

## 2022-09-13 NOTE — NURSING
Patient discharged home. All belongings taken home with patient. AVS reviewed and all questions answered. Meds delivered to bedside prior to discharge. Left for home with son.

## 2022-09-13 NOTE — PROGRESS NOTES
[facility] - Neurosurgery ([facility] Medicine  History & Physical     Patient Name: Kathie Quezada  MRN: [id number]  Patient Class: OP- Observation  Admission Date: 9/11/2022  Attending Physician: Giselle Raines MD   Primary Care Provider: Elan Price MD     SUBJECTIVE:     Follow-up For:  Headache     Interval history/ROS:    9/13: BP better, checked with pharamcy - does not appear she is filling her Meds. Will be conservative - nifedipine and coreg for discharge. Stroke evaluation negative - Seen by vascular neurology.     HPI: Kathie Quezada is a 62 y.o. female with PMHx of HTN, HLD, CKD, diastolic HF, T2DM, stroke (2017) and obesity presents to the ED for possible stroke. Patient woke up at 5 AM with headache and left LE weakness. She returned to sleep, woke up at 8 AM and the headache had not improved. Patient took her blood pressure, noted it to be very high, contacted her doctor who instructed her to take her medications and continue monitoring it. She stated that the blood pressure would improve when she would lay down, however it would worsen when she got up to do any housework. In the afternoon, she began experiencing nausea, light headedness, chills, blurry vision and left sided facial numbness. She also noted that her speech was slower and slurred. Of note, patient stated her blood pressure has been uncontrolled since 9/7/2022. Upon arrival to the ED, stroke code was activated, was not a candidate for tPA. Vascular neurology was consulted, work-up was negative for stroke. Admitted for observation. Denies chest pain, dyspnea, vomiting, fever.     ED: /98, HR 81, afebrile, SpO2 100% on RA. CMP: Na 134, K 5.6, Cr 3.8, Glucose 145. TSH 43.212. Vascular neurology consulted.  CT Head: No evidence of acute intracranial pathology, specifically no evidence of acute large vascular territory infarct or intracranial hemorrhage.  MRI Brain: No acute intracranial abnormality  MRA  Neck: Approximate 50% stenosis of the left proximal ICA.       OBJECTIVE:     Body mass index is 38.4 kg/m².    Vital Signs Range (Last 24H):  Temp:  [96.2 °F (35.7 °C)-98.9 °F (37.2 °C)]   Pulse:  [77-83]   Resp:  [16-18]   BP: (139-163)/(60-80)   SpO2:  [92 %-100 %]     I & O (Last 24H):    Intake/Output Summary (Last 24 hours) at 9/13/2022 1127  Last data filed at 9/13/2022 0600  Gross per 24 hour   Intake 600 ml   Output 904 ml   Net -304 ml        Physical Exam:  Vitals and nursing note reviewed.   Constitutional:       General: She is not in acute distress.     Appearance: She is well-developed. She is obese.   HENT:      Head: Normocephalic and atraumatic.      Mouth/Throat:      Pharynx: No oropharyngeal exudate.   Eyes:      Extraocular Movements: Extraocular movements intact.      Conjunctiva/sclera: Conjunctivae normal.   Cardiovascular:      Rate and Rhythm: Normal rate and regular rhythm.      Heart sounds: Normal heart sounds.   Pulmonary:      Effort: Pulmonary effort is normal. No respiratory distress.      Breath sounds: Normal breath sounds. No wheezing.   Abdominal:      General: Bowel sounds are normal. There is no distension.      Palpations: Abdomen is soft.      Tenderness: There is no abdominal tenderness.   Musculoskeletal:         General: No tenderness. Normal range of motion.      Cervical back: Normal range of motion and neck supple.   Lymphadenopathy:      Cervical: No cervical adenopathy.   Skin:     General: Skin is warm and dry.      Capillary Refill: Capillary refill takes less than 2 seconds.      Findings: No rash.   Neurological:      General: No focal deficit present.      Mental Status: She is alert and oriented to person, place, and time.      Cranial Nerves: No cranial nerve deficit.      Sensory: No sensory deficit.      Coordination: Coordination normal.   Psychiatric:         Behavior: Behavior normal.         Thought Content: Thought content normal.         Judgment:  Judgment normal.        Recent Labs   Lab 09/11/22 2106 09/12/22 0726 09/13/22 0219   * 139 140   K 5.6* 4.3 4.0    108 109   CO2 18* 22* 20*   BUN 34* 33* 38*   CREATININE 3.8* 3.6* 3.6*   * 163* 125*   CALCIUM 7.6* 8.0* 7.9*   MG  --  1.8 1.9   PHOS  --  4.6* 4.9*     Recent Labs   Lab 09/07/22  1047 09/11/22  2106   ALKPHOS 106 114   ALT 14 16   AST 17 27   ALBUMIN 3.5 3.4*   PROT 7.4 7.2   BILITOT 0.6 0.4   INR  --  1.1       Recent Labs   Lab 09/07/22  1047 09/12/22 0726 09/13/22 0219   WBC 12.70 11.83 10.78   HGB 10.6* 10.6* 9.4*   HCT 32.4* 31.9* 28.1*    151 106*   GRAN 72.6  9.2* 71.0  8.4* 63.1  6.8   LYMPH 19.0  2.4 20.1  2.4 26.9  2.9   MONO 5.7  0.7 6.4  0.8 7.0  0.8       Recent Labs   Lab 09/12/22  0734 09/12/22  1152 09/12/22  1540 09/13/22  0754   POCTGLUCOSE 180* 165* 126* 153*       No results for input(s): TROPONINI in the last 168 hours.    Diagnostic Results:  Labs: Reviewed    aspirin, 81 mg, Oral, Daily  carvediloL, 25 mg, Oral, BID WM  cholecalciferol (vitamin D3), 5,000 Units, Oral, Daily  EScitalopram oxalate, 10 mg, Oral, Daily  heparin (porcine), 5,000 Units, Subcutaneous, Q8H  levothyroxine, 200 mcg, Oral, Before breakfast  NIFEdipine, 30 mg, Oral, Daily        As Needed acetaminophen, acetaminophen, albuterol-ipratropium, aluminum-magnesium hydroxide-simethicone, bisacodyL, carboxymethylcellulose sodium, dextrose 10%, dextrose 10%, glucagon (human recombinant), glucose, glucose, hydrALAZINE, melatonin, naloxone, ondansetron, polyethylene glycol, prochlorperazine, simethicone, sodium chloride 0.9%, traZODone    ASSESSMENT/PLAN:     Assessment: Kathie Quezada is a 62 y.o. female here with:     Active Hospital Problems    Diagnosis  POA    *Headache [R51.9]  Yes    Left sided numbness [R20.0]  Yes    Weakness of left lower extremity [R29.898]  Yes    History of stroke [Z86.73]  Not Applicable    Type 2 diabetes mellitus with stage 4 chronic  kidney disease, without long-term current use of insulin [E11.22, N18.4]  Yes    Class 2 severe obesity due to excess calories with serious comorbidity and body mass index (BMI) of 35.0 to 35.9 in adult [E66.01, Z68.35]  Not Applicable    Chronic diastolic heart failure [I50.32]  Yes     2-8-19  Mild left atrial enlargement.  Mild left ventricular enlargement.  Normal left ventricular systolic function. The estimated ejection fraction is 55%  Indeterminate left ventricular diastolic function.  Normal right ventricular systolic function.  Mild mitral regurgitation.  Mild to moderate tricuspid regurgitation.  The estimated PA systolic pressure is 32 mm Hg  Normal central venous pressure (3 mm Hg).  Trivial peircumferential pericardial effusion.      CKD (chronic kidney disease) stage 4, GFR 15-29 ml/min [N18.4]  Yes    Postoperative hypothyroidism [E89.0]  Yes    Mixed hyperlipidemia [E78.2]  Yes    Renovascular hypertension [I15.0]  Yes      Resolved Hospital Problems   No resolved problems to display.        Plan:       Headache  - Persistent headache most likely 2/2 uncontrolled blood pressure  - Control blood pressure, neurology recommendation of SBP goal <220  - Tylenol PRN for pain  - Monitor for any change in symptoms     History of stroke  - Stroke in 2017     Weakness of left lower extremity  Left sided numbness  History of Stroke     - Vascular neurology noted antigravity, LLE falls to bed before counting to 5  - MRI/MRA WNL  - vascular consult completed.      Type 2 diabetes mellitus with stage 4 chronic kidney disease, without long-term current use of insulin  - Per patient, home diabetes medications discontinued by PCP due to CKD  - Monitor blood glucose with routine POCT      Last A1c reviewed-         Lab Results   Component Value Date     HGBA1C 6.8 (H) 09/07/2022         Class 2 severe obesity due to excess calories with serious comorbidity and body mass index (BMI) of 35.0 to 35.9 in adult  Body mass  index is 38.4 kg/m². Morbid obesity complicates all aspects of disease management from diagnostic modalities to treatment. Weight loss encouraged and health benefits explained to patient.             Chronic diastolic heart failure  - Last TTE was 2019, EF 55%  - Order echo for AM  - Continue home medication lasix 20 mg PO daily     CKD (chronic kidney disease) stage 4, GFR 15-29 ml/min  - Cr 3.8  - Monitor with daily BMP  - PCP placed nephrology referral, follow-up outpatient     Mixed hyperlipidemia  - , on 9/7/22   - Neurology recommendations included starting statin, if able to tolerate  - PCP discontinued atorvastatin due to normal cholesterol and adverse side effect of stomach pain  - Will defer to PCP for decision on resuming statin therapy        Renovascular hypertension  - Not seeing fills of her home HTN medications: amlodipine 10 mg PO daily and coreg 25 mg PO daily  - Monitor BP with q4h vital checks       Postoperative hypothyroidism  - TSH 43.212, Free T4 0.96  - Continue home synthroid 200 mcg PO, before breakfast, daily  - Monitor for symptoms         HIGH RISK CONDITION(S):  Patient has a condition that poses threat to life and bodily function: hypertension        Giselle Raines MD

## 2022-09-19 ENCOUNTER — IMMUNIZATION (OUTPATIENT)
Dept: INTERNAL MEDICINE | Facility: CLINIC | Age: 62
End: 2022-09-19
Payer: COMMERCIAL

## 2022-09-19 ENCOUNTER — OFFICE VISIT (OUTPATIENT)
Dept: INTERNAL MEDICINE | Facility: CLINIC | Age: 62
End: 2022-09-19
Payer: COMMERCIAL

## 2022-09-19 VITALS
OXYGEN SATURATION: 99 % | DIASTOLIC BLOOD PRESSURE: 80 MMHG | BODY MASS INDEX: 37.94 KG/M2 | SYSTOLIC BLOOD PRESSURE: 120 MMHG | HEART RATE: 72 BPM | WEIGHT: 265 LBS | HEIGHT: 70 IN

## 2022-09-19 DIAGNOSIS — T46.6X5A MYALGIA DUE TO STATIN: ICD-10-CM

## 2022-09-19 DIAGNOSIS — R80.9 NEPHROTIC RANGE PROTEINURIA: ICD-10-CM

## 2022-09-19 DIAGNOSIS — E11.42 DIABETIC POLYNEUROPATHY ASSOCIATED WITH TYPE 2 DIABETES MELLITUS: ICD-10-CM

## 2022-09-19 DIAGNOSIS — E55.9 VITAMIN D DEFICIENCY: ICD-10-CM

## 2022-09-19 DIAGNOSIS — N18.4 CKD (CHRONIC KIDNEY DISEASE) STAGE 4, GFR 15-29 ML/MIN: ICD-10-CM

## 2022-09-19 DIAGNOSIS — M79.10 MYALGIA DUE TO STATIN: ICD-10-CM

## 2022-09-19 DIAGNOSIS — E11.3553 CONTROLLED TYPE 2 DIABETES MELLITUS WITH STABLE PROLIFERATIVE RETINOPATHY OF BOTH EYES, UNSPECIFIED WHETHER LONG TERM INSULIN USE: ICD-10-CM

## 2022-09-19 DIAGNOSIS — N18.4 TYPE 2 DIABETES MELLITUS WITH STAGE 4 CHRONIC KIDNEY DISEASE, WITHOUT LONG-TERM CURRENT USE OF INSULIN: ICD-10-CM

## 2022-09-19 DIAGNOSIS — Z23 NEED FOR VACCINATION: Primary | ICD-10-CM

## 2022-09-19 DIAGNOSIS — E66.01 CLASS 2 SEVERE OBESITY DUE TO EXCESS CALORIES WITH SERIOUS COMORBIDITY AND BODY MASS INDEX (BMI) OF 35.0 TO 35.9 IN ADULT: ICD-10-CM

## 2022-09-19 DIAGNOSIS — E89.0 POSTOPERATIVE HYPOTHYROIDISM: ICD-10-CM

## 2022-09-19 DIAGNOSIS — I15.0 RENOVASCULAR HYPERTENSION: ICD-10-CM

## 2022-09-19 DIAGNOSIS — E11.22 TYPE 2 DIABETES MELLITUS WITH STAGE 4 CHRONIC KIDNEY DISEASE, WITHOUT LONG-TERM CURRENT USE OF INSULIN: ICD-10-CM

## 2022-09-19 DIAGNOSIS — F41.1 GENERALIZED ANXIETY DISORDER: ICD-10-CM

## 2022-09-19 DIAGNOSIS — R09.81 SINUS CONGESTION: ICD-10-CM

## 2022-09-19 DIAGNOSIS — D64.9 NORMOCYTIC ANEMIA: ICD-10-CM

## 2022-09-19 DIAGNOSIS — E78.2 MIXED HYPERLIPIDEMIA: ICD-10-CM

## 2022-09-19 DIAGNOSIS — I50.32 CHRONIC DIASTOLIC HEART FAILURE: Primary | ICD-10-CM

## 2022-09-19 DIAGNOSIS — D68.1 FACTOR XI DEFICIENCY: ICD-10-CM

## 2022-09-19 PROBLEM — R20.0 LEFT SIDED NUMBNESS: Status: RESOLVED | Noted: 2022-09-11 | Resolved: 2022-09-19

## 2022-09-19 PROBLEM — R29.898 WEAKNESS OF LEFT LOWER EXTREMITY: Status: RESOLVED | Noted: 2022-09-11 | Resolved: 2022-09-19

## 2022-09-19 PROBLEM — R51.9 HEADACHE: Status: RESOLVED | Noted: 2022-09-11 | Resolved: 2022-09-19

## 2022-09-19 PROCEDURE — 3044F PR MOST RECENT HEMOGLOBIN A1C LEVEL <7.0%: ICD-10-PCS | Mod: HCNC,CPTII,S$GLB, | Performed by: INTERNAL MEDICINE

## 2022-09-19 PROCEDURE — 1159F PR MEDICATION LIST DOCUMENTED IN MEDICAL RECORD: ICD-10-PCS | Mod: HCNC,CPTII,S$GLB, | Performed by: INTERNAL MEDICINE

## 2022-09-19 PROCEDURE — 3079F DIAST BP 80-89 MM HG: CPT | Mod: HCNC,CPTII,S$GLB, | Performed by: INTERNAL MEDICINE

## 2022-09-19 PROCEDURE — 0124A PR IMMUNIZ ADMIN, SARS-COV- 2 COVID-19 VACCINE, 30MCG/0.3ML, BIVALENT, BOOSTER DOSE: ICD-10-PCS | Mod: S$GLB,,, | Performed by: INTERNAL MEDICINE

## 2022-09-19 PROCEDURE — 3008F BODY MASS INDEX DOCD: CPT | Mod: HCNC,CPTII,S$GLB, | Performed by: INTERNAL MEDICINE

## 2022-09-19 PROCEDURE — 99999 PR PBB SHADOW E&M-EST. PATIENT-LVL III: ICD-10-PCS | Mod: PBBFAC,HCNC,, | Performed by: INTERNAL MEDICINE

## 2022-09-19 PROCEDURE — 99214 OFFICE O/P EST MOD 30 MIN: CPT | Mod: HCNC,S$GLB,, | Performed by: INTERNAL MEDICINE

## 2022-09-19 PROCEDURE — 90686 FLU VACCINE (QUAD) GREATER THAN OR EQUAL TO 3YO PRESERVATIVE FREE IM: ICD-10-PCS | Mod: HCNC,S$GLB,, | Performed by: INTERNAL MEDICINE

## 2022-09-19 PROCEDURE — G0008 FLU VACCINE (QUAD) GREATER THAN OR EQUAL TO 3YO PRESERVATIVE FREE IM: ICD-10-PCS | Mod: HCNC,S$GLB,, | Performed by: INTERNAL MEDICINE

## 2022-09-19 PROCEDURE — 91312 COVID-19, MRNA, LNP-S, BIVALENT BOOSTER, PF, 30 MCG/0.3 ML DOSE: CPT | Mod: HCNC,S$GLB,, | Performed by: INTERNAL MEDICINE

## 2022-09-19 PROCEDURE — 99214 PR OFFICE/OUTPT VISIT, EST, LEVL IV, 30-39 MIN: ICD-10-PCS | Mod: HCNC,S$GLB,, | Performed by: INTERNAL MEDICINE

## 2022-09-19 PROCEDURE — 91312 COVID-19, MRNA, LNP-S, BIVALENT BOOSTER, PF, 30 MCG/0.3 ML DOSE: ICD-10-PCS | Mod: HCNC,S$GLB,, | Performed by: INTERNAL MEDICINE

## 2022-09-19 PROCEDURE — 1159F MED LIST DOCD IN RCRD: CPT | Mod: HCNC,CPTII,S$GLB, | Performed by: INTERNAL MEDICINE

## 2022-09-19 PROCEDURE — 99999 PR PBB SHADOW E&M-EST. PATIENT-LVL III: CPT | Mod: PBBFAC,HCNC,, | Performed by: INTERNAL MEDICINE

## 2022-09-19 PROCEDURE — 3074F PR MOST RECENT SYSTOLIC BLOOD PRESSURE < 130 MM HG: ICD-10-PCS | Mod: HCNC,CPTII,S$GLB, | Performed by: INTERNAL MEDICINE

## 2022-09-19 PROCEDURE — 3074F SYST BP LT 130 MM HG: CPT | Mod: HCNC,CPTII,S$GLB, | Performed by: INTERNAL MEDICINE

## 2022-09-19 PROCEDURE — 3008F PR BODY MASS INDEX (BMI) DOCUMENTED: ICD-10-PCS | Mod: HCNC,CPTII,S$GLB, | Performed by: INTERNAL MEDICINE

## 2022-09-19 PROCEDURE — G0008 ADMIN INFLUENZA VIRUS VAC: HCPCS | Mod: HCNC,S$GLB,, | Performed by: INTERNAL MEDICINE

## 2022-09-19 PROCEDURE — 90686 IIV4 VACC NO PRSV 0.5 ML IM: CPT | Mod: HCNC,S$GLB,, | Performed by: INTERNAL MEDICINE

## 2022-09-19 PROCEDURE — 3079F PR MOST RECENT DIASTOLIC BLOOD PRESSURE 80-89 MM HG: ICD-10-PCS | Mod: HCNC,CPTII,S$GLB, | Performed by: INTERNAL MEDICINE

## 2022-09-19 PROCEDURE — 0124A PR IMMUNIZ ADMIN, SARS-COV- 2 COVID-19 VACCINE, 30MCG/0.3ML, BIVALENT, BOOSTER DOSE: CPT | Mod: S$GLB,,, | Performed by: INTERNAL MEDICINE

## 2022-09-19 PROCEDURE — 0124A COVID-19, MRNA, LNP-S, BIVALENT BOOSTER, PF, 30 MCG/0.3 ML DOSE: CPT | Mod: HCNC,CV19,PBBFAC | Performed by: INTERNAL MEDICINE

## 2022-09-19 PROCEDURE — 3044F HG A1C LEVEL LT 7.0%: CPT | Mod: HCNC,CPTII,S$GLB, | Performed by: INTERNAL MEDICINE

## 2022-09-19 RX ORDER — CARVEDILOL 25 MG/1
25 TABLET ORAL 2 TIMES DAILY WITH MEALS
Qty: 180 TABLET | Refills: 3 | Status: SHIPPED | OUTPATIENT
Start: 2022-09-19 | End: 2023-01-04 | Stop reason: SDUPTHER

## 2022-09-19 RX ORDER — TRAZODONE HYDROCHLORIDE 50 MG/1
50-100 TABLET ORAL NIGHTLY PRN
Qty: 120 TABLET | Refills: 3 | Status: SHIPPED | OUTPATIENT
Start: 2022-09-19 | End: 2023-01-04 | Stop reason: SDUPTHER

## 2022-09-19 RX ORDER — FUROSEMIDE 40 MG/1
40 TABLET ORAL DAILY
Qty: 90 TABLET | Refills: 3 | Status: SHIPPED | OUTPATIENT
Start: 2022-09-19 | End: 2023-01-04

## 2022-09-19 RX ORDER — LEVOCETIRIZINE DIHYDROCHLORIDE 5 MG/1
5 TABLET, FILM COATED ORAL DAILY PRN
Qty: 90 TABLET | Refills: 3 | Status: SHIPPED | OUTPATIENT
Start: 2022-09-19 | End: 2023-01-04 | Stop reason: SDUPTHER

## 2022-09-19 RX ORDER — TRAZODONE HYDROCHLORIDE 50 MG/1
50-100 TABLET ORAL NIGHTLY PRN
Qty: 120 TABLET | Refills: 3 | Status: SHIPPED | OUTPATIENT
Start: 2022-09-19 | End: 2022-09-19 | Stop reason: SDUPTHER

## 2022-09-19 RX ORDER — AMLODIPINE BESYLATE 10 MG/1
10 TABLET ORAL DAILY
Qty: 90 TABLET | Refills: 3 | Status: SHIPPED | OUTPATIENT
Start: 2022-09-19 | End: 2023-01-04 | Stop reason: SDUPTHER

## 2022-09-19 RX ORDER — LEVOTHYROXINE SODIUM 200 UG/1
200 TABLET ORAL
Qty: 90 TABLET | Refills: 3 | Status: SHIPPED | OUTPATIENT
Start: 2022-09-19 | End: 2023-01-04 | Stop reason: SDUPTHER

## 2022-09-19 RX ORDER — ESCITALOPRAM OXALATE 10 MG/1
10 TABLET ORAL DAILY
Qty: 90 TABLET | Refills: 3 | Status: SHIPPED | OUTPATIENT
Start: 2022-09-19 | End: 2023-01-04 | Stop reason: SDUPTHER

## 2022-09-19 NOTE — PROGRESS NOTES
INTERNAL MEDICINE CLINIC  Follow-up Visit Progress Note     PRESENTING HISTORY     PCP: Elan Price MD    Last Clinic Visit with me:  9-7-2022    Current Chief Complaint/Problem:    Chief Complaint   Patient presents with    Follow-up      History of Present Illness & ROS: Ms. Kathie Quezada is a 62 y.o. female.    Feeling better.   Did not take the meds she got from the hospital.  She is con the meds I gave her.    Abdominal and back muscle pain is gone after stopping statin.    No fever or chill.    Has nasal allergy.    PAST HISTORY:     Past Medical History:   Diagnosis Date    Acute respiratory failure with hypoxia and hypercapnia 5/26/2019    ATN (acute tubular necrosis) 2/8/2019    Chronic diastolic heart failure 2/7/2019 2-8-19 Mild left atrial enlargement. Mild left ventricular enlargement. Normal left ventricular systolic function. The estimated ejection fraction is 55% Indeterminate left ventricular diastolic function. Normal right ventricular systolic function. Mild mitral regurgitation. Mild to moderate tricuspid regurgitation. The estimated PA systolic pressure is 32 mm Hg Normal central venous pressure (3 m    CKD (chronic kidney disease) stage 4, GFR 15-29 ml/min 5/16/2018    CKD stage G3a/A3, GFR 45-59 and albumin creatinine ratio >300 mg/g 5/16/2018    CKD stage G3b/A3, GFR 30-44 and albumin creatinine ratio >300 mg/g 5/16/2018    Closed compression fracture of L2 lumbar vertebra 5/24/2019    Closed compression fracture of L2 lumbar vertebra 5/24/2019     IMO Regulatory Load October 2019    Controlled type 2 diabetes with retinopathy 4/9/2018    COVID-19 virus infection 7-1-2021 7/1/2021    CVA (cerebral vascular accident) 2017    Diabetic polyneuropathy associated with type 2 diabetes mellitus 12/4/2020    Essential hypertension 4/9/2018    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  7-31-19 Factor XI Activity 55 - 145 % 96       Gastroesophageal reflux disease  without esophagitis 5/24/2019    Generalized anxiety disorder 11/8/2019    GERD (gastroesophageal reflux disease)     GIB (gastrointestinal bleeding) 10/18/2018    History of hepatitis C     s/p succesful Rx w/ [facility]  - SVR12 (cure) 2016    Hypertensive retinopathy, bilateral 12/10/2019    Mild nonproliferative diabetic retinopathy 05/02/2018    Mixed hyperlipidemia 4/9/2018    Myalgia due to statin 9/7/2022    Normocytic anemia 1/14/2022    NS (nuclear sclerosis), bilateral 12/10/2019    PNA (pneumonia) 5/28/2019    Postoperative hypothyroidism     Renovascular hypertension 4/9/2018    S/P kyphoplasty 8/14/2019 8/14/19: L2 kyphoplasty with Dr. Kwon     Slow transit constipation 5/27/2019    Type 2 diabetes mellitus with circulatory disorder, with long-term current use of insulin 4/9/2018    Type 2 diabetes mellitus with stage 3 chronic kidney disease, without long-term current use of insulin 4/9/2018    Type 2 diabetes mellitus with stage 3a chronic kidney disease, without long-term current use of insulin 3/3/2021    Type 2 diabetes mellitus with stage 3b chronic kidney disease, without long-term current use of insulin 9/7/2022       Past Surgical History:   Procedure Laterality Date    BACK SURGERY  2017    CHOLECYSTECTOMY  2015    [facility]    COLONOSCOPY  11/13/2015    Dr Chino torres prep. internal hemorrhoids. diverticulosis of sigmois. NO polyps. repeat due 2025    FIXATION KYPHOPLASTY Bilateral 08/14/2019    Procedure: L2 kyphoplasty Fluoro Globus;  Surgeon: Jeremie Kwon DO;  Location: [facility];  Service: Neurosurgery;  Laterality: Bilateral;  GLOBUS MACRINA AGOSTORADHAYIFAN [phone] TEXTED HER @ 10:54AM ON 7-30-19  PRE-OP-BY RN  8-7-2019    HYSTERECTOMY      OOPHORECTOMY  1996    one    THYROIDECTOMY  2016    at [facility] for [facility] nodule    TONSILLECTOMY         Family History   Problem Relation Age of Onset    Diabetes type II Mother        Social History     Socioeconomic History    Marital status:     Tobacco Use    Smoking status: Never    Smokeless tobacco: Never   Substance and Sexual Activity    Alcohol use: Yes     Alcohol/week: 4.0 standard drinks     Types: 4 Cans of beer per week     Comment: weekly    Drug use: No   Social History Narrative    Pt enjoys cooking and baking and making Floral arrangement and [employer]      Social Determinants of Health     Financial Resource Strain: Low Risk     Difficulty of Paying Living Expenses: Not very hard   Food Insecurity: No Food Insecurity    Worried About Running Out of Food in the Last Year: Never true    Ran Out of Food in the Last Year: Never true   Transportation Needs: No Transportation Needs    Lack of Transportation (Medical): No    Lack of Transportation (Non-Medical): No   Physical Activity: Unknown    Days of Exercise per Week: Patient refused    Minutes of Exercise per Session: 30 min   Stress: No Stress Concern Present    Feeling of Stress : Only a little   Social Connections: Unknown    Frequency of Communication with Friends and Family: More than three times a week    Frequency of Social Gatherings with Friends and Family: More than three times a week    Active Member of Clubs or Organizations: Yes    Attends Club or Organization Meetings: Patient refused    Marital Status:    Housing Stability: Low Risk     Unable to Pay for Housing in the Last Year: No    Number of Places Lived in the Last Year: 1    Unstable Housing in the Last Year: No       MEDICATIONS & ALLERGIES:     Current Outpatient Medications on File Prior to Visit   Medication Sig Dispense Refill    carvediloL (COREG) 25 MG tablet Take 1 tablet (25 mg total) by mouth 2 (two) times daily with meals. 180 tablet 3    cholecalciferol, vitamin D3, 125 mcg (5,000 unit) Tab Take 1 tablet (5,000 Units total) by mouth once daily. 90 tablet 3    ondansetron (ZOFRAN-ODT) 4 MG TbDL Take 1 tablet (4 mg total) by mouth every 8 (eight) hours as needed (nausea). 90 tablet 2                    furosemide (LASIX) 40 MG tablet Take 1 tablet (40 mg total) by mouth once daily. 30 tablet 0       Review of patient's allergies indicates:   Allergen Reactions    Atorvastatin Other (See Comments)     Statin induced myalgia    Mushroom Hives    Bactrim [sulfamethoxazole-trimethoprim] Hives    Codeine Hives    Gabapentin      Pruritis     Iodine     Peanut butter flavor     Penicillins Hives    Shellfish containing products Itching    Sulfa (sulfonamide antibiotics) Hives       Medications Reconciliation:   I have reconciled the patient's home medications and discharge medications with the patient/family. I have updated all changes.  Refer to After-Visit Medication List.    OBJECTIVE:     Vital Signs:  Vitals:    09/19/22 0919   BP: 120/80   Pulse: 72     Wt Readings from Last 3 Encounters:   09/19/22 0919 120.2 kg (264 lb 15.9 oz)   09/12/22 0732 117.9 kg (260 lb)   09/12/22 0230 117.9 kg (260 lb)   09/07/22 1005 119.3 kg (263 lb 0.1 oz)     Body mass index is 38.02 kg/m².     Physical Exam:  General: Well developed, well nourished. No distress.  HEENT: Head is normocephalic, atraumatic  Eyes: Clear conjunctiva.  Neck: Supple, symmetrical neck; trachea midline.  Lungs: Clear to auscultation bilaterally and normal respiratory effort.  Cardiovascular: Heart with regular rate and rhythm.    Extremities: No LE edema.  Abdomen: Abdomen is soft, non-tender non-distended with normal bowel sounds.  Skin: Skin color, texture, turgor normal. No rashes.  Musculoskeletal: Normal gait.   Neurologic: Normal strength and tone. No focal numbness or weakness.   Psychiatric: Normal affect. Alert.    Laboratory  Lab Results   Component Value Date    WBC 10.78 09/13/2022    HGB 9.4 (L) 09/13/2022    HCT 28.1 (L) 09/13/2022     (L) 09/13/2022    CHOL 180 09/11/2022    TRIG 145 09/11/2022    HDL 58 09/11/2022    ALT 16 09/11/2022    AST 27 09/11/2022     09/13/2022    K 4.0 09/13/2022     09/13/2022    CREATININE  3.6 (H) 09/13/2022    BUN 38 (H) 09/13/2022    CO2 20 (L) 09/13/2022    TSH 43.212 (H) 09/11/2022    INR 1.1 09/11/2022    HGBA1C 6.8 (H) 09/07/2022       ASSESSMENT & PLAN:     Chronic diastolic heart failure  CKD stage 4  Nephrotic range proteinuria  Renovascular hypertension  - Compensated CHF.    Could not get DIgital HTN to work for her.          US 11-19-21  Preserved right and left cortical thickness, echogenicity, and corticomedullary differentiation.  No right or left renal masses, stones, or hydronephrosis.  Right renal resistive index 0.83.  Left renal resistive index 0.82.  Right kidney measures 10.7 cm.  Left kidney measures 10.9 cm.  Given level of bladder distention with urine, no intraluminal masses. Unremarkable renal ultrasound.       CHF/HTN Regimen:   -     amLODIPine (NORVASC) 10 MG tablet; Take 1 tablet (10 mg total) by mouth once daily.    -     carvediloL (COREG) 25 MG tablet; Take 1 tablet (25 mg total) by mouth 2 (two) times daily   -     furosemide (LASIX) 40 MG tablet; Take 1 tablet (40 mg total) by mouth once daily.      Nephrology consult: - upcoming 9-20-22.     Type 2 diabetes mellitus with stage 3 chronic kidney disease, without long-term current use of insulin  Diabetic Neuropathy and retinopathy  - Diet controlled. A1c 6.2     Mixed hyperlipidemia  Statin Induced myopathy.  - LDL goal < 100    Last LDL 88 ()     - Statin induced myopathy with atorvastatin.     Vitamin D deficiency  - Change from weekly to daily Vitamin D3 5000 IU daily OTC.   -     cholecalciferol, vitamin D3, 125 mcg (5,000 unit) Tab; Take 1 tablet (5,000 Units total) by mouth once daily.     Postoperative hypothyroidism  -     SYNTHROID 200 mcg tablet; Take 1 tablet (200 mcg total) by mouth before breakfast.      High TSH due to patient not taking med.        Dicussed with the patient.     Normocytic anemia  -   Normal B12 and iron.      Probably due to CKD4.     Generalized anxiety disorder  -      EScitalopram oxalate (LEXAPRO) 10 MG tablet; Take 1 tablet (10 mg total) by mouth once daily.   -     traZODone (DESYREL) 50 MG tablet; Take 1 tablet (50 mg total) by mouth nightly as needed for Insomnia.        Factor XI deficiency  - Usually get  FFP 2 units right before surgery for Factor XI deficiency     Class 2 severe obesity due to excess calories with serious comorbidity and body mass index (BMI) of 35.0 to 35.9 in adult  Body mass index is 36.31 kg/m².     Sinus congestion  -     levocetirizine (XYZAL) 5 MG tablet; Take 1 tablet (5 mg total) by mouth daily as needed for Allergies.  Dispense: 90 tablet; Refill: 3    Preventive Health Maintenance:  Flu vaccien and COVID booster.  Eye exam in November - patient to schedule.     Return to Clinic for Follow Up with me:       Scheduled Follow-up :  Future Appointments   Date Time Provider Department Center   9/19/2022 10:00 AM FLU VACCINE, PRIMARY CARE IMMUNIZATION STATION [facility] PCW   9/20/2022 10:30 AM Cuba Kimball MD [facility] NEPHRO [facility]   10/11/2022  9:20 AM [facility], DEXA1 [facility] BMD [facility]   1/4/2023 11:00 [facility]       After Visit Medication List :     Medication List            Accurate as of September 19, 2022  9:59 AM. If you have any questions, ask your nurse or doctor.                START taking these medications      amLODIPine 10 MG tablet  Commonly known as: NORVASC  Take 1 tablet (10 mg total) by mouth once daily.  Started by: Elan Price MD     EScitalopram oxalate 10 MG tablet  Commonly known as: LEXAPRO  Take 1 tablet (10 mg total) by mouth once daily.  Started by: Elan Price MD     levocetirizine 5 MG tablet  Commonly known as: XYZAL  Take 1 tablet (5 mg total) by mouth daily as needed for Allergies.  Started by: Elan Price MD     traZODone 50 MG tablet  Commonly known as: DESYREL  Take 1-2 tablets ( mg total) by mouth nightly as needed for Insomnia.  Started by: Elan Price MD             CONTINUE taking these medications      carvediloL 25 MG tablet  Commonly known as: COREG  Take 1 tablet (25 mg total) by mouth 2 (two) times daily with meals.     cholecalciferol (vitamin D3) 125 mcg (5,000 unit) Tab  Take 1 tablet (5,000 Units total) by mouth once daily.     furosemide 40 MG tablet  Commonly known as: LASIX  Take 1 tablet (40 mg total) by mouth once daily.     ondansetron 4 MG Tbdl  Commonly known as: ZOFRAN-ODT  Take 1 tablet (4 mg total) by mouth every 8 (eight) hours as needed (nausea).     SYNTHROID 200 MCG tablet  Generic drug: levothyroxine  Take 1 tablet (200 mcg total) by mouth before breakfast.            STOP taking these medications      aspirin 81 MG Chew  Stopped by: Elan Price MD     atorvastatin 10 MG tablet  Commonly known as: LIPITOR  Stopped by: Elan Price MD     NIFEdipine 30 MG (OSM) 24 hr tablet  Commonly known as: PROCARDIA-XL  Stopped by: Elan Price MD               Where to Get Your Medications        These medications were sent to [facility]  [address] [place] [id number]      Hours: Mon-Fri, 8a-5:30p Phone: [phone]   amLODIPine 10 MG tablet  carvediloL 25 MG tablet  EScitalopram oxalate 10 MG tablet  furosemide 40 MG tablet  levocetirizine 5 MG tablet  SYNTHROID 200 MCG tablet  traZODone 50 MG tablet         Signing Physician:  Elan Price MD

## 2022-09-20 ENCOUNTER — OFFICE VISIT (OUTPATIENT)
Dept: NEPHROLOGY | Facility: CLINIC | Age: 62
End: 2022-09-20
Payer: MEDICARE

## 2022-09-20 VITALS
WEIGHT: 264.31 LBS | DIASTOLIC BLOOD PRESSURE: 92 MMHG | HEIGHT: 70 IN | SYSTOLIC BLOOD PRESSURE: 150 MMHG | BODY MASS INDEX: 37.84 KG/M2 | HEART RATE: 77 BPM | OXYGEN SATURATION: 100 %

## 2022-09-20 DIAGNOSIS — D63.1 ANEMIA OF CHRONIC RENAL FAILURE, STAGE 5: ICD-10-CM

## 2022-09-20 DIAGNOSIS — Z01.818 PREOP TESTING: Primary | ICD-10-CM

## 2022-09-20 DIAGNOSIS — N18.5 CKD (CHRONIC KIDNEY DISEASE), STAGE V: Primary | ICD-10-CM

## 2022-09-20 DIAGNOSIS — N18.5 ANEMIA OF CHRONIC RENAL FAILURE, STAGE 5: ICD-10-CM

## 2022-09-20 PROCEDURE — 99999 PR PBB SHADOW E&M-EST. PATIENT-LVL IV: CPT | Mod: PBBFAC,HCNC,, | Performed by: INTERNAL MEDICINE

## 2022-09-20 PROCEDURE — 3080F PR MOST RECENT DIASTOLIC BLOOD PRESSURE >= 90 MM HG: ICD-10-PCS | Mod: HCNC,CPTII,S$GLB, | Performed by: INTERNAL MEDICINE

## 2022-09-20 PROCEDURE — 3008F PR BODY MASS INDEX (BMI) DOCUMENTED: ICD-10-PCS | Mod: HCNC,CPTII,S$GLB, | Performed by: INTERNAL MEDICINE

## 2022-09-20 PROCEDURE — 3077F PR MOST RECENT SYSTOLIC BLOOD PRESSURE >= 140 MM HG: ICD-10-PCS | Mod: HCNC,CPTII,S$GLB, | Performed by: INTERNAL MEDICINE

## 2022-09-20 PROCEDURE — 3044F PR MOST RECENT HEMOGLOBIN A1C LEVEL <7.0%: ICD-10-PCS | Mod: HCNC,CPTII,S$GLB, | Performed by: INTERNAL MEDICINE

## 2022-09-20 PROCEDURE — 99214 PR OFFICE/OUTPT VISIT, EST, LEVL IV, 30-39 MIN: ICD-10-PCS | Mod: HCNC,S$GLB,, | Performed by: INTERNAL MEDICINE

## 2022-09-20 PROCEDURE — 99214 OFFICE O/P EST MOD 30 MIN: CPT | Mod: HCNC,S$GLB,, | Performed by: INTERNAL MEDICINE

## 2022-09-20 PROCEDURE — 3008F BODY MASS INDEX DOCD: CPT | Mod: HCNC,CPTII,S$GLB, | Performed by: INTERNAL MEDICINE

## 2022-09-20 PROCEDURE — 3080F DIAST BP >= 90 MM HG: CPT | Mod: HCNC,CPTII,S$GLB, | Performed by: INTERNAL MEDICINE

## 2022-09-20 PROCEDURE — 3044F HG A1C LEVEL LT 7.0%: CPT | Mod: HCNC,CPTII,S$GLB, | Performed by: INTERNAL MEDICINE

## 2022-09-20 PROCEDURE — 3077F SYST BP >= 140 MM HG: CPT | Mod: HCNC,CPTII,S$GLB, | Performed by: INTERNAL MEDICINE

## 2022-09-20 PROCEDURE — 1159F PR MEDICATION LIST DOCUMENTED IN MEDICAL RECORD: ICD-10-PCS | Mod: HCNC,CPTII,S$GLB, | Performed by: INTERNAL MEDICINE

## 2022-09-20 PROCEDURE — 3066F NEPHROPATHY DOC TX: CPT | Mod: HCNC,CPTII,S$GLB, | Performed by: INTERNAL MEDICINE

## 2022-09-20 PROCEDURE — 1159F MED LIST DOCD IN RCRD: CPT | Mod: HCNC,CPTII,S$GLB, | Performed by: INTERNAL MEDICINE

## 2022-09-20 PROCEDURE — 99999 PR PBB SHADOW E&M-EST. PATIENT-LVL IV: ICD-10-PCS | Mod: PBBFAC,HCNC,, | Performed by: INTERNAL MEDICINE

## 2022-09-20 PROCEDURE — 3066F PR DOCUMENTATION OF TREATMENT FOR NEPHROPATHY: ICD-10-PCS | Mod: HCNC,CPTII,S$GLB, | Performed by: INTERNAL MEDICINE

## 2022-09-20 RX ORDER — SODIUM BICARBONATE 325 MG/1
650 TABLET ORAL 2 TIMES DAILY
Qty: 120 TABLET | Refills: 6 | Status: ON HOLD | OUTPATIENT
Start: 2022-09-20 | End: 2023-02-10 | Stop reason: HOSPADM

## 2022-09-20 NOTE — PROGRESS NOTES
REASON FOR VISIT: ckd    REFERRING PHYSICIAN: Elan Price MD      HISTORY OF PRESENT ILLNESS: 62 y.o. female who  has a past medical history of DMII with retinopathy, HTN, CVA, and multiple AKIS the last was from urinary retention. patient was referred here for abnormal renal function, her renal function has been declining due to above mentioned reasons over the last 2-3 years, she used to see us in the clinic but lost follow up. Patient feels weak ans nauseated some times, no urinary or cardiac symptoms, no NSAIDs intake.         ALLERGIES:  Review of patient's allergies indicates:   Allergen Reactions    Atorvastatin Other (See Comments)     Statin induced myalgia    Mushroom Hives    Bactrim [sulfamethoxazole-trimethoprim] Hives    Codeine Hives    Gabapentin      Pruritis     Iodine     Peanut butter flavor     Penicillins Hives    Shellfish containing products Itching    Sulfa (sulfonamide antibiotics) Hives       MEDICATIONS:    Current Outpatient Medications:     amLODIPine (NORVASC) 10 MG tablet, Take 1 tablet (10 mg total) by mouth once daily., Disp: 90 tablet, Rfl: 3    carvediloL (COREG) 25 MG tablet, Take 1 tablet (25 mg total) by mouth 2 (two) times daily with meals., Disp: 180 tablet, Rfl: 3    cholecalciferol, vitamin D3, 125 mcg (5,000 unit) Tab, Take 1 tablet (5,000 Units total) by mouth once daily., Disp: 90 tablet, Rfl: 3    EScitalopram oxalate (LEXAPRO) 10 MG tablet, Take 1 tablet (10 mg total) by mouth once daily., Disp: 90 tablet, Rfl: 3    furosemide (LASIX) 40 MG tablet, Take 1 tablet (40 mg total) by mouth once daily., Disp: 90 tablet, Rfl: 3    levocetirizine (XYZAL) 5 MG tablet, Take 1 tablet (5 mg total) by mouth daily as needed for Allergies., Disp: 90 tablet, Rfl: 3    ondansetron (ZOFRAN-ODT) 4 MG TbDL, Take 1 tablet (4 mg total) by mouth every 8 (eight) hours as needed (nausea)., Disp: 90 tablet, Rfl: 2    SYNTHROID 200 mcg tablet, Take 1 tablet (200 mcg total) by mouth before  "breakfast., Disp: 90 tablet, Rfl: 3    traZODone (DESYREL) 50 MG tablet, Take 1 to 2 tablets ( mg total) by mouth nightly as needed for Insomnia., Disp: 120 tablet, Rfl: 3    sodium bicarbonate 325 MG tablet, Take 2 tablets (650 mg total) by mouth 2 (two) times daily., Disp: 120 tablet, Rfl: 6   Medication List with Changes/Refills   New Medications    SODIUM BICARBONATE 325 MG TABLET    Take 2 tablets (650 mg total) by mouth 2 (two) times daily.   Current Medications    AMLODIPINE (NORVASC) 10 MG TABLET    Take 1 tablet (10 mg total) by mouth once daily.    CARVEDILOL (COREG) 25 MG TABLET    Take 1 tablet (25 mg total) by mouth 2 (two) times daily with meals.    CHOLECALCIFEROL, VITAMIN D3, 125 MCG (5,000 UNIT) TAB    Take 1 tablet (5,000 Units total) by mouth once daily.    ESCITALOPRAM OXALATE (LEXAPRO) 10 MG TABLET    Take 1 tablet (10 mg total) by mouth once daily.    FUROSEMIDE (LASIX) 40 MG TABLET    Take 1 tablet (40 mg total) by mouth once daily.    LEVOCETIRIZINE (XYZAL) 5 MG TABLET    Take 1 tablet (5 mg total) by mouth daily as needed for Allergies.    ONDANSETRON (ZOFRAN-ODT) 4 MG TBDL    Take 1 tablet (4 mg total) by mouth every 8 (eight) hours as needed (nausea).    SYNTHROID 200 MCG TABLET    Take 1 tablet (200 mcg total) by mouth before breakfast.    TRAZODONE (DESYREL) 50 MG TABLET    Take 1 to 2 tablets ( mg total) by mouth nightly as needed for Insomnia.        PHYSICAL EXAM:  BP (!) 150/92 (BP Location: Right arm, Patient Position: Sitting)   Pulse 77   Ht 5' 10" (1.778 m)   Wt 119.9 kg (264 lb 5.3 oz)   SpO2 100%   BMI 37.93 kg/m²     General: No distress, No fever or chills  Head: Normocephalic,atraumatic  Eyes: conjunctivae/corneas clear. PERRL, EOM's intact.  Nose: Nares normal. Mucosa normal. No drainage or sinus tenderness.  Neck: No adenopathy,no carotid bruit,no JVD  Lungs:Clear to auscultation bilaterally, No Crackles  Heart: Regular rate and rhythm, no murmur, gallops " or rubs  Abdomen: Soft, no tenderness, bowel sounds normal  Extremities: Atraumatic, no edema in LE  Skin: Turgor normal. No rashes or ulcers  Neurologic: No focal weakness, oriented.  Dialysis Access: Non applicable         LABS:  Lab Results   Component Value Date    CREATININE 3.6 (H) 09/13/2022       Prot/Creat Ratio, Urine   Date Value Ref Range Status   05/27/2019 1.37 (H) 0.00 - 0.20 Final   02/21/2019 1.70 (H) 0.00 - 0.20 Final   08/23/2018 3.85 (H) 0.00 - 0.20 Final       Lab Results   Component Value Date     09/13/2022    K 4.0 09/13/2022    CO2 20 (L) 09/13/2022       last PTH   Lab Results   Component Value Date    .0 (H) 08/23/2018    CALCIUM 7.9 (L) 09/13/2022    PHOS 4.9 (H) 09/13/2022       Lab Results   Component Value Date    HGB 9.4 (L) 09/13/2022        Lab Results   Component Value Date    HGBA1C 6.8 (H) 09/07/2022       Lab Results   Component Value Date    LDLCALC 93.0 09/11/2022           ASSESSMENT:    CKD V  -likely from DMII (with retinopathy) /uncontrolled HTN/PVD (CVA with residual weakness) and recurrent AKIs  -Cr baseline ~ 3.0 with eGFR ~ 15 ml/min  -UPCR 1.5 g/g, used to be in nephrotic range before: spep,HIV,Hep panel wnl  -Renal US ckd    HTN  Controlled better at home on Coreg, Thiazide and Amlodipine     Anemia  -from ckd  -Hgb goal ~ 10  -Iron stores low    Secondary Hyperparathyroidism  -Phos high/Ca acceptable  -PTH acceptable      Acid/Base  -Metabolic acidosis              PLAN:  -Continue current BP meds regimen   -Iron infusion and NEO inj  -Referral to transplant and vascular sx  -dietitian consult  -Low salt diet < 2 g/d  -Avoid NSAIDs intake      RTC in 2-3 months with labs      Thanks for allowing me to participate in the care of this patient.     3:32 PM    CARLTON GIRON MD  NEPHROLOGY ATTENDING

## 2022-09-23 DIAGNOSIS — D63.1 ANEMIA OF CHRONIC RENAL FAILURE, STAGE 5: Primary | ICD-10-CM

## 2022-09-23 DIAGNOSIS — N18.5 ANEMIA OF CHRONIC RENAL FAILURE, STAGE 5: Primary | ICD-10-CM

## 2022-09-25 NOTE — DISCHARGE SUMMARY
DISCHARGE SUMMARY  Hospital Medicine    Team: [facility] MED X    Patient Name: Kathie Quezada  YOB: 1960    Admit Date: 9/11/2022    Discharge Date: 9/13/2022    Discharge Attending Physician: Giselle Raines     Admitting Resident    Diagnoses:  Active Hospital Problems    Diagnosis  POA    Type 2 diabetes mellitus with stage 4 chronic kidney disease, without long-term current use of insulin [E11.22, N18.4]  Yes    Class 2 severe obesity due to excess calories with serious comorbidity and body mass index (BMI) of 35.0 to 35.9 in adult [E66.01, Z68.35]  Not Applicable    Chronic diastolic heart failure [I50.32]  Yes     2-8-19  Mild left atrial enlargement.  Mild left ventricular enlargement.  Normal left ventricular systolic function. The estimated ejection fraction is 55%  Indeterminate left ventricular diastolic function.  Normal right ventricular systolic function.  Mild mitral regurgitation.  Mild to moderate tricuspid regurgitation.  The estimated PA systolic pressure is 32 mm Hg  Normal central venous pressure (3 mm Hg).  Trivial peircumferential pericardial effusion.      CKD (chronic kidney disease) stage 4, GFR 15-29 ml/min [N18.4]  Yes    Postoperative hypothyroidism [E89.0]  Yes    Mixed hyperlipidemia [E78.2]  Yes    Renovascular hypertension [I15.0]  Yes      Resolved Hospital Problems    Diagnosis Date Resolved POA    *Headache [R51.9] 09/19/2022 Yes    Left sided numbness [R20.0] 09/19/2022 Yes    Weakness of left lower extremity [R29.898] 09/19/2022 Yes       Discharged Condition: admit problems have stabilized       HOSPITAL COURSE:      Initial Presentation:    Kathie Quezada is a 62 y.o. female with PMHx of HTN, HLD, CKD, diastolic HF, T2DM, stroke (2017) and obesity presents to the ED for possible stroke. Patient woke up at 5 AM with headache and left LE weakness. She returned to sleep, woke up at 8 AM and the headache had not improved. Patient took her blood  pressure, noted it to be very high, contacted her doctor who instructed her to take her medications and continue monitoring it. She stated that the blood pressure would improve when she would lay down, however it would worsen when she got up to do any housework. In the afternoon, she began experiencing nausea, light headedness, chills, blurry vision and left sided facial numbness. She also noted that her speech was slower and slurred. Of note, patient stated her blood pressure has been uncontrolled since 9/7/2022. Upon arrival to the ED, stroke code was activated, was not a candidate for tPA. Vascular neurology was consulted, work-up was negative for stroke. Admitted for observation. Denies chest pain, dyspnea, vomiting, fever.     ED: /98, HR 81, afebrile, SpO2 100% on RA. CMP: Na 134, K 5.6, Cr 3.8, Glucose 145. TSH 43.212. Vascular neurology consulted.  CT Head: No evidence of acute intracranial pathology, specifically no evidence of acute large vascular territory infarct or intracranial hemorrhage.  MRI Brain: No acute intracranial abnormality  MRA Neck: Approximate 50%    Course of Principle Problem for Admission:    Headache  - Persistent headache most likely 2/2 uncontrolled blood pressure  - Control blood pressure, neurology recommendation of SBP goal <220  - Tylenol PRN for pain  - Monitor for any change in symptoms     History of stroke  - Stroke in 2017     Weakness of left lower extremity  Left sided numbness  History of Stroke     - Vascular neurology noted antigravity, LLE falls to bed before counting to 5  - MRI/MRA WNL  - vascular consult completed.      Type 2 diabetes mellitus with stage 4 chronic kidney disease, without long-term current use of insulin  - Per patient, home diabetes medications discontinued by PCP due to CKD  - Monitor blood glucose with routine POCT     CONSULTS: vascular neurology     Last CBC/BMP/HgbA1c (if applicable):  Recent Results (from the past 336 hour(s))   CBC  with Automated Differential    Collection Time: 09/13/22  2:19 AM   Result Value Ref Range    WBC 10.78 3.90 - 12.70 K/uL    Hemoglobin 9.4 (L) 12.0 - 16.0 g/dL    Hematocrit 28.1 (L) 37.0 - 48.5 %    Platelets 106 (L) 150 - 450 K/uL   CBC with Automated Differential    Collection Time: 09/12/22  7:26 AM   Result Value Ref Range    WBC 11.83 3.90 - 12.70 K/uL    Hemoglobin 10.6 (L) 12.0 - 16.0 g/dL    Hematocrit 31.9 (L) 37.0 - 48.5 %    Platelets 151 150 - 450 K/uL     Recent Results (from the past 336 hour(s))   Basic Metabolic Panel (BMP)    Collection Time: 09/13/22  2:19 AM   Result Value Ref Range    Sodium 140 136 - 145 mmol/L    Potassium 4.0 3.5 - 5.1 mmol/L    Chloride 109 95 - 110 mmol/L    CO2 20 (L) 23 - 29 mmol/L    BUN 38 (H) 6 - 20 mg/dL    Creatinine 3.6 (H) 0.5 - 1.4 mg/dL    Calcium 7.9 (L) 8.7 - 10.5 mg/dL    Anion Gap 11 8 - 16 mmol/L   Basic Metabolic Panel (BMP)    Collection Time: 09/12/22  7:26 AM   Result Value Ref Range    Sodium 139 136 - 145 mmol/L    Potassium 4.3 3.5 - 5.1 mmol/L    Chloride 108 95 - 110 mmol/L    CO2 22 (L) 23 - 29 mmol/L    BUN 33 (H) 6 - 20 mg/dL    Creatinine 3.6 (H) 0.5 - 1.4 mg/dL    Calcium 8.0 (L) 8.7 - 10.5 mg/dL    Anion Gap 9 8 - 16 mmol/L     Lab Results   Component Value Date    HGBA1C 6.8 (H) 09/07/2022       Disposition:   Home with Home Health       Future Scheduled Appointments:  Future Appointments   Date Time Provider Department Center   9/28/2022 10:00 AM VASCULAR, LAB [facility] ROSI Dill   9/28/2022 10:40 AM Tirso Tavera MD [facility] SHAWNA Dill   10/3/2022 10:20 AM LAB, APPOINTMENT [facility] ELIOT [facility] LAB IM [facility] PCW   10/6/2022  9:15 AM NEPHROLOGY, NURSE [facility] [address]   10/11/2022  9:20 AM NOMMAJOR, DEXA1 [facility] BMD Cj y   10/20/2022  8:00 AM Alejandra Coon NP [facility]   11/21/2022 10:30 AM Fred Kelly MD [facility]   1/4/2023 11:00 AM Elan Price MD [facility] PCW       Follow-up Plans from  This Hospitalization:  Nephrology     Discharge Medication List:        Medication List        CONTINUE taking these medications      cholecalciferol (vitamin D3) 125 mcg (5,000 unit) Tab  Take 1 tablet (5,000 Units total) by mouth once daily.     ondansetron 4 MG Tbdl  Commonly known as: ZOFRAN-ODT  Take 1 tablet (4 mg total) by mouth every 8 (eight) hours as needed (nausea).            STOP taking these medications      carvediloL 25 MG tablet  Commonly known as: COREG     furosemide 20 MG tablet  Commonly known as: LASIX     SYNTHROID 200 MCG tablet  Generic drug: levothyroxine               Where to Get Your Medications        These medications were sent to [facility]  [address] [place] [id number]      Hours: Mon-Fri, 8a-5:30p Phone: [phone]   cholecalciferol (vitamin D3) 125 mcg (5,000 unit) Tab        Patient Instructions:  Discharge Procedure Orders   Ambulatory referral/consult to Nephrology   Standing Status: Future   Referral Priority: Routine Referral Type: Consultation   Referral Reason: Specialty Services Required   Requested Specialty: Nephrology   Number of Visits Requested: 1     Ambulatory referral/consult to Internal Medicine   Standing Status: Future   Referral Priority: Routine Referral Type: Consultation   Referral Reason: Specialty Services Required   Requested Specialty: Internal Medicine   Number of Visits Requested: 1       Signing Physician:  Giselle Raines MD

## 2022-09-28 ENCOUNTER — DOCUMENTATION ONLY (OUTPATIENT)
Dept: VASCULAR SURGERY | Facility: CLINIC | Age: 62
End: 2022-09-28

## 2022-09-28 ENCOUNTER — INITIAL CONSULT (OUTPATIENT)
Dept: VASCULAR SURGERY | Facility: CLINIC | Age: 62
End: 2022-09-28
Payer: MEDICARE

## 2022-09-28 ENCOUNTER — HOSPITAL ENCOUNTER (OUTPATIENT)
Dept: VASCULAR SURGERY | Facility: CLINIC | Age: 62
Discharge: HOME | End: 2022-09-28
Attending: SURGERY
Payer: COMMERCIAL

## 2022-09-28 VITALS
HEART RATE: 73 BPM | WEIGHT: 264.56 LBS | DIASTOLIC BLOOD PRESSURE: 91 MMHG | TEMPERATURE: 98 F | SYSTOLIC BLOOD PRESSURE: 185 MMHG | HEIGHT: 70 IN | BODY MASS INDEX: 37.87 KG/M2

## 2022-09-28 DIAGNOSIS — N18.5 CKD (CHRONIC KIDNEY DISEASE), STAGE V: Primary | ICD-10-CM

## 2022-09-28 DIAGNOSIS — Z01.818 PREOP TESTING: ICD-10-CM

## 2022-09-28 DIAGNOSIS — N18.5 CHRONIC KIDNEY DISEASE (CKD), STAGE V: Primary | ICD-10-CM

## 2022-09-28 DIAGNOSIS — N18.5 CKD (CHRONIC KIDNEY DISEASE), STAGE V: ICD-10-CM

## 2022-09-28 PROCEDURE — 93970 PR US DUPLEX, UPPER OR LOWER EXT VENOUS,COMPLETE BILAT: ICD-10-PCS | Mod: HCNC,S$GLB,, | Performed by: SURGERY

## 2022-09-28 PROCEDURE — 99999 PR PBB SHADOW E&M-EST. PATIENT-LVL IV: CPT | Mod: PBBFAC,,, | Performed by: SURGERY

## 2022-09-28 PROCEDURE — 99214 OFFICE O/P EST MOD 30 MIN: CPT | Mod: PBBFAC,HCNC | Performed by: SURGERY

## 2022-09-28 PROCEDURE — 93970 EXTREMITY STUDY: CPT | Mod: HCNC,S$GLB,, | Performed by: SURGERY

## 2022-09-28 PROCEDURE — 99499 UNLISTED E&M SERVICE: CPT | Mod: HCNC,S$GLB,, | Performed by: SURGERY

## 2022-09-28 PROCEDURE — 99999 PR PBB SHADOW E&M-EST. PATIENT-LVL IV: ICD-10-PCS | Mod: PBBFAC,,, | Performed by: SURGERY

## 2022-09-28 PROCEDURE — 99204 OFFICE O/P NEW MOD 45 MIN: CPT | Mod: S$GLB,,, | Performed by: SURGERY

## 2022-09-28 PROCEDURE — 99204 PR OFFICE/OUTPT VISIT, NEW, LEVL IV, 45-59 MIN: ICD-10-PCS | Mod: S$GLB,,, | Performed by: SURGERY

## 2022-09-28 PROCEDURE — 99499 RISK ADDL DX/OHS AUDIT: ICD-10-PCS | Mod: HCNC,S$GLB,, | Performed by: SURGERY

## 2022-09-28 RX ORDER — CLINDAMYCIN PHOSPHATE 900 MG/50ML
900 INJECTION, SOLUTION INTRAVENOUS
Status: CANCELLED | OUTPATIENT
Start: 2022-09-28

## 2022-09-28 RX ORDER — SODIUM CHLORIDE 9 MG/ML
INJECTION, SOLUTION INTRAVENOUS CONTINUOUS
Status: CANCELLED | OUTPATIENT
Start: 2022-09-28

## 2022-09-28 NOTE — LETTER
September 28, 2022  Cuba Kimball MD  [address] [facility] [address]      Vascular/Endovascular  Surgery  Department of Surgery  [facility] [address] [place] [address]  Phone: [phone]  Fax: [phone]    Dr. Tavera's Vascular Nurse:    Rosetta Cardenas, ALIEN  [phone]  [email or web address] Patient: Kathie Quezada   MR Number: [id number]   YOB: 1960   Date of Visit: 9/28/2022     Dear Dr. Kimball:    Thank you for referring Kathie Quezada to me for evaluation. Below is my Impression and Plan for this patient:     62 y.o. female with a h/o DMII with retinopathy, HTN, stroke and obesity (BMI 40), and multiple AKIs the last was from urinary retention in need of future AV access  No suitable L cephalic vein; L basilic also small  Weight loss   To avoid LUE AVG, plan for L 1st stage brachial artery/vein AVF. If needs HD sooner, will do LUE AVG     If you have questions, please do not hesitate to call me. I look forward to following Kathie Quezada along with you.    Sincerely,    Tirso Tavera MD [facility]  Endowed [facility] Professor of Surgery  , Vascular Surgery Fellowship  [facility]  No Recipients

## 2022-09-28 NOTE — H&P (VIEW-ONLY)
Kathie Quezada  09/28/2022    HPI:  Patient is a 62 y.o. female with a h/o DMII with retinopathy, HTN, CVA, Factor 11 deficiency, and multiple AKIS the last was from urinary retention who is here today for evaluation of  HD access.  She is R handed with an obese upper arm.  Pt is unsure of timeframe for needing dialysis.  Last GFR 13.9, Cr 3.6.  Hypertensive today to 180s SBP.     No hx of MI  Hx of CHF, EF 65%%, enlarged RA   Stroke ~3 years ago, no residual deficits   Tobacco use: never     Past Medical History:   Diagnosis Date    Acute respiratory failure with hypoxia and hypercapnia 5/26/2019    ATN (acute tubular necrosis) 2/8/2019    Chronic diastolic heart failure 2/7/2019 2-8-19 Mild left atrial enlargement. Mild left ventricular enlargement. Normal left ventricular systolic function. The estimated ejection fraction is 55% Indeterminate left ventricular diastolic function. Normal right ventricular systolic function. Mild mitral regurgitation. Mild to moderate tricuspid regurgitation. The estimated PA systolic pressure is 32 mm Hg Normal central venous pressure (3 m    CKD (chronic kidney disease) stage 4, GFR 15-29 ml/min 5/16/2018    CKD stage G3a/A3, GFR 45-59 and albumin creatinine ratio >300 mg/g 5/16/2018    CKD stage G3b/A3, GFR 30-44 and albumin creatinine ratio >300 mg/g 5/16/2018    Closed compression fracture of L2 lumbar vertebra 5/24/2019    Closed compression fracture of L2 lumbar vertebra 5/24/2019     IMO Regulatory Load October 2019    Controlled type 2 diabetes with retinopathy 4/9/2018    COVID-19 virus infection 7-1-2021 7/1/2021    CVA (cerebral vascular accident) 2017    Diabetic polyneuropathy associated with type 2 diabetes mellitus 12/4/2020    Essential hypertension 4/9/2018    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  7-31-19 Factor XI Activity 55 - 145 % 96       Gastroesophageal reflux disease without esophagitis 5/24/2019    Generalized anxiety  disorder 11/8/2019    GERD (gastroesophageal reflux disease)     GIB (gastrointestinal bleeding) 10/18/2018    History of hepatitis C     s/p succesful Rx w/ Harvoni  - SVR12 (cure) 2016    Hypertensive retinopathy, bilateral 12/10/2019    Mild nonproliferative diabetic retinopathy 05/02/2018    Mixed hyperlipidemia 4/9/2018    Myalgia due to statin 9/7/2022    Normocytic anemia 1/14/2022    NS (nuclear sclerosis), bilateral 12/10/2019    PNA (pneumonia) 5/28/2019    Postoperative hypothyroidism     Renovascular hypertension 4/9/2018    S/P kyphoplasty 8/14/2019 8/14/19: L2 kyphoplasty with Dr. Kwon     Slow transit constipation 5/27/2019    Type 2 diabetes mellitus with circulatory disorder, with long-term current use of insulin 4/9/2018    Type 2 diabetes mellitus with stage 3 chronic kidney disease, without long-term current use of insulin 4/9/2018    Type 2 diabetes mellitus with stage 3a chronic kidney disease, without long-term current use of insulin 3/3/2021    Type 2 diabetes mellitus with stage 3b chronic kidney disease, without long-term current use of insulin 9/7/2022     Past Surgical History:   Procedure Laterality Date    BACK SURGERY  2017    CHOLECYSTECTOMY  2015    [facility]    COLONOSCOPY  11/13/2015    Dr Chino torres prep. internal hemorrhoids. diverticulosis of sigmois. NO polyps. repeat due 2025    FIXATION KYPHOPLASTY Bilateral 08/14/2019    Procedure: L2 kyphoplasty Fluoro Globus;  Surgeon: Jeremie Kwon DO;  Location: [facility];  Service: Neurosurgery;  Laterality: Bilateral;  GLOBUS MACRINA WINSLOW [phone] TEXTED HER @ 10:54AM ON 7-30-19  PRE-OP-BY RN  8-7-2019    HYSTERECTOMY      OOPHORECTOMY  1996    one    THYROIDECTOMY  2016    at [facility] for thyroid nodule    TONSILLECTOMY       Family History   Problem Relation Age of Onset    Diabetes type II Mother      Social History     Socioeconomic History    Marital status:    Tobacco Use    Smoking status: Never    Smokeless tobacco:  Never   Substance and Sexual Activity    Alcohol use: Yes     Alcohol/week: 4.0 standard drinks     Types: 4 Cans of beer per week     Comment: weekly    Drug use: No   Social History Narrative    Pt enjoys cooking and baking and making Flor[employer] and [employer]      Social Determinants of Health     Financial Resource Strain: Low Risk     Difficulty of Paying Living Expenses: Not very hard   Food Insecurity: No Food Insecurity    Worried About Running Out of Food in the Last Year: Never true    Ran Out of Food in the Last Year: Never true   Transportation Needs: No Transportation Needs    Lack of Transportation (Medical): No    Lack of Transportation (Non-Medical): No   Physical Activity: Unknown    Days of Exercise per Week: Patient refused    Minutes of Exercise per Session: 30 min   Stress: No Stress Concern Present    Feeling of Stress : Only a little   Social Connections: Unknown    Frequency of Communication with Friends and Family: More than three times a week    Frequency of Social Gatherings with Friends and Family: More than three times a week    Active Member of Clubs or Organizations: Yes    Attends Club or Organization Meetings: Patient refused    Marital Status:    Housing Stability: Low Risk     Unable to Pay for Housing in the Last Year: No    Number of Places Lived in the Last Year: 1    Unstable Housing in the Last Year: No       Current Outpatient Medications:     amLODIPine (NORVASC) 10 MG tablet, Take 1 tablet (10 mg total) by mouth once daily., Disp: 90 tablet, Rfl: 3    carvediloL (COREG) 25 MG tablet, Take 1 tablet (25 mg total) by mouth 2 (two) times daily with meals., Disp: 180 tablet, Rfl: 3    cholecalciferol, vitamin D3, 125 mcg (5,000 unit) Tab, Take 1 tablet (5,000 Units total) by mouth once daily., Disp: 90 tablet, Rfl: 3    EScitalopram oxalate (LEXAPRO) 10 MG tablet, Take 1 tablet (10 mg total) by mouth once daily., Disp: 90 tablet, Rfl: 3    furosemide (LASIX) 40  MG tablet, Take 1 tablet (40 mg total) by mouth once daily., Disp: 90 tablet, Rfl: 3    levocetirizine (XYZAL) 5 MG tablet, Take 1 tablet (5 mg total) by mouth daily as needed for Allergies., Disp: 90 tablet, Rfl: 3    ondansetron (ZOFRAN-ODT) 4 MG TbDL, Take 1 tablet (4 mg total) by mouth every 8 (eight) hours as needed (nausea)., Disp: 90 tablet, Rfl: 2    sodium bicarbonate 325 MG tablet, Take 2 tablets (650 mg total) by mouth 2 (two) times daily., Disp: 120 tablet, Rfl: 6    SYNTHROID 200 mcg tablet, Take 1 tablet (200 mcg total) by mouth before breakfast., Disp: 90 tablet, Rfl: 3    traZODone (DESYREL) 50 MG tablet, Take 1 to 2 tablets ( mg total) by mouth nightly as needed for Insomnia., Disp: 120 tablet, Rfl: 3    REVIEW OF SYSTEMS:  General: negative; ENT: negative; Allergy and Immunology: negative; Hematological and Lymphatic: Negative; Endocrine: negative; Respiratory: no cough, shortness of breath, or wheezing; Cardiovascular: no chest pain or dyspnea on exertion; Gastrointestinal: no abdominal pain/back, change in bowel habits, or bloody stools; Genito-Urinary: no dysuria, trouble voiding, or hematuria; Musculoskeletal: negative  Neurological: no TIA or stroke symptoms; Psychiatric: no nervousness, anxiety or depression.    PHYSICAL EXAM:   Right Arm BP - Sittin/91 (22 1047)  Left Arm BP - Sittin/89 (22 1047)  Pulse: 73  Temp: 97.9 °F (36.6 °C)      General appearance:  Alert, well-appearing, and in no distress.  Oriented to person, place, and time   Neurological: Normal speech, no focal findings noted; CN II - XII grossly intact           Musculoskeletal: Digits/nail without cyanosis/clubbing.  Normal muscle strength/tone.                 Neck: Supple, no significant adenopathy; thyroid is not enlarged                Chest:  Clear to auscultation, no wheezes, rales or rhonchi, symmetric air entry     No use of accessory muscles             Cardiac: Normal rate and regular  rhythm, S1 and S2 normal; PMI non-displaced          Abdomen: Soft, nontender, nondistended, no masses or organomegaly     No rebound tenderness noted; bowel sounds normal     No groin adenopathy      Extremities:   3 cm scar overlying L antecubital fossa      Weak 1+ L radial pulse, 2+ L brachial pulse      Obese upper arm      + Scar's test     No ulcerations    LAB RESULTS:  Lab Results   Component Value Date    K 4.0 09/13/2022    K 4.3 09/12/2022    K 5.6 (H) 09/11/2022    CREATININE 3.6 (H) 09/13/2022    CREATININE 3.6 (H) 09/12/2022    CREATININE 3.8 (H) 09/11/2022     Lab Results   Component Value Date    WBC 10.78 09/13/2022    WBC 11.83 09/12/2022    WBC 12.70 09/07/2022    HCT 28.1 (L) 09/13/2022    HCT 31.9 (L) 09/12/2022    HCT 32.4 (L) 09/07/2022     (L) 09/13/2022     09/12/2022     09/07/2022     Lab Results   Component Value Date    HGBA1C 6.8 (H) 09/07/2022    HGBA1C 6.2 (H) 12/21/2021    HGBA1C 5.7 (H) 12/01/2020     IMAGING:    IMP/PLAN:     62 y.o. female with a h/o DMII with retinopathy, HTN, stroke and obesity (BMI 40), and multiple AKIs the last was from urinary retention in need of future AV access  No suitable L cephalic vein; L basilic also small  Weight loss   To avoid LUE AVG, plan for L 1st stage brachial artery/vein AVF. If needs HD sooner, will do LUE AVG      Tirso Tavera MD DFSVS FACS   Vascular/Endovascular Surgery

## 2022-09-28 NOTE — PROGRESS NOTES
Kathie Quezada  09/28/2022    HPI:  Patient is a 62 y.o. female with a h/o DMII with retinopathy, HTN, CVA, Factor 11 deficiency, and multiple AKIS the last was from urinary retention who is here today for evaluation of  HD access.  She is R handed with an obese upper arm.  Pt is unsure of timeframe for needing dialysis.  Last GFR 13.9, Cr 3.6.  Hypertensive today to 180s SBP.     No hx of MI  Hx of CHF, EF 65%%, enlarged RA   Stroke ~3 years ago, no residual deficits   Tobacco use: never     Past Medical History:   Diagnosis Date    Acute respiratory failure with hypoxia and hypercapnia 5/26/2019    ATN (acute tubular necrosis) 2/8/2019    Chronic diastolic heart failure 2/7/2019 2-8-19 Mild left atrial enlargement. Mild left ventricular enlargement. Normal left ventricular systolic function. The estimated ejection fraction is 55% Indeterminate left ventricular diastolic function. Normal right ventricular systolic function. Mild mitral regurgitation. Mild to moderate tricuspid regurgitation. The estimated PA systolic pressure is 32 mm Hg Normal central venous pressure (3 m    CKD (chronic kidney disease) stage 4, GFR 15-29 ml/min 5/16/2018    CKD stage G3a/A3, GFR 45-59 and albumin creatinine ratio >300 mg/g 5/16/2018    CKD stage G3b/A3, GFR 30-44 and albumin creatinine ratio >300 mg/g 5/16/2018    Closed compression fracture of L2 lumbar vertebra 5/24/2019    Closed compression fracture of L2 lumbar vertebra 5/24/2019     IMO Regulatory Load October 2019    Controlled type 2 diabetes with retinopathy 4/9/2018    COVID-19 virus infection 7-1-2021 7/1/2021    CVA (cerebral vascular accident) 2017    Diabetic polyneuropathy associated with type 2 diabetes mellitus 12/4/2020    Essential hypertension 4/9/2018    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  7-31-19 Factor XI Activity 55 - 145 % 96       Gastroesophageal reflux disease without esophagitis 5/24/2019    Generalized anxiety  disorder 11/8/2019    GERD (gastroesophageal reflux disease)     GIB (gastrointestinal bleeding) 10/18/2018    History of hepatitis C     s/p succesful Rx w/ [facility]  - SVR12 (cure) 2016    Hypertensive retinopathy, bilateral 12/10/2019    Mild nonproliferative diabetic retinopathy 05/02/2018    Mixed hyperlipidemia 4/9/2018    Myalgia due to statin 9/7/2022    Normocytic anemia 1/14/2022    NS (nuclear sclerosis), bilateral 12/10/2019    PNA (pneumonia) 5/28/2019    Postoperative hypothyroidism     Renovascular hypertension 4/9/2018    S/P kyphoplasty 8/14/2019 8/14/19: L2 kyphoplasty with Dr. Kwon     Slow transit constipation 5/27/2019    Type 2 diabetes mellitus with circulatory disorder, with long-term current use of insulin 4/9/2018    Type 2 diabetes mellitus with stage 3 chronic kidney disease, without long-term current use of insulin 4/9/2018    Type 2 diabetes mellitus with stage 3a chronic kidney disease, without long-term current use of insulin 3/3/2021    Type 2 diabetes mellitus with stage 3b chronic kidney disease, without long-term current use of insulin 9/7/2022     Past Surgical History:   Procedure Laterality Date    BACK SURGERY  2017    CHOLECYSTECTOMY  2015    [facility]    COLONOSCOPY  11/13/2015    Dr Chino torres prep. internal hemorrhoids. diverticulosis of sigmois. NO polyps. repeat due 2025    FIXATION KYPHOPLASTY Bilateral 08/14/2019    Procedure: L2 kyphoplasty Fluoro Globus;  Surgeon: Jeremie Kwon DO;  Location: [facility];  Service: Neurosurgery;  Laterality: Bilateral;  GLOBUS MACRINA WINSLOW [phone] TEXTED HER @ 10:54AM ON 7-30-19  PRE-OP-BY RN  8-7-2019    HYSTERECTOMY      OOPHORECTOMY  1996    one    THYROIDECTOMY  2016    at [facility] for thyroid nodule    TONSILLECTOMY       Family History   Problem Relation Age of Onset    Diabetes type II Mother      Social History     Socioeconomic History    Marital status:    Tobacco Use    Smoking status: Never    Smokeless tobacco:  Never   Substance and Sexual Activity    Alcohol use: Yes     Alcohol/week: 4.0 standard drinks     Types: 4 Cans of beer per week     Comment: weekly    Drug use: No   Social History Narrative    Pt enjoys cooking and baking and making Floral arrangement and [employer]      Social Determinants of Health     Financial Resource Strain: Low Risk     Difficulty of Paying Living Expenses: Not very hard   Food Insecurity: No Food Insecurity    Worried About Running Out of Food in the Last Year: Never true    Ran Out of Food in the Last Year: Never true   Transportation Needs: No Transportation Needs    Lack of Transportation (Medical): No    Lack of Transportation (Non-Medical): No   Physical Activity: Unknown    Days of Exercise per Week: Patient refused    Minutes of Exercise per Session: 30 min   Stress: No Stress Concern Present    Feeling of Stress : Only a little   Social Connections: Unknown    Frequency of Communication with Friends and Family: More than three times a week    Frequency of Social Gatherings with Friends and Family: More than three times a week    Active Member of Clubs or Organizations: Yes    Attends Club or Organization Meetings: Patient refused    Marital Status:    Housing Stability: Low Risk     Unable to Pay for Housing in the Last Year: No    Number of Places Lived in the Last Year: 1    Unstable Housing in the Last Year: No       Current Outpatient Medications:     amLODIPine (NORVASC) 10 MG tablet, Take 1 tablet (10 mg total) by mouth once daily., Disp: 90 tablet, Rfl: 3    carvediloL (COREG) 25 MG tablet, Take 1 tablet (25 mg total) by mouth 2 (two) times daily with meals., Disp: 180 tablet, Rfl: 3    cholecalciferol, vitamin D3, 125 mcg (5,000 unit) Tab, Take 1 tablet (5,000 Units total) by mouth once daily., Disp: 90 tablet, Rfl: 3    EScitalopram oxalate (LEXAPRO) 10 MG tablet, Take 1 tablet (10 mg total) by mouth once daily., Disp: 90 tablet, Rfl: 3    furosemide (LASIX) 40  MG tablet, Take 1 tablet (40 mg total) by mouth once daily., Disp: 90 tablet, Rfl: 3    levocetirizine (XYZAL) 5 MG tablet, Take 1 tablet (5 mg total) by mouth daily as needed for Allergies., Disp: 90 tablet, Rfl: 3    ondansetron (ZOFRAN-ODT) 4 MG TbDL, Take 1 tablet (4 mg total) by mouth every 8 (eight) hours as needed (nausea)., Disp: 90 tablet, Rfl: 2    sodium bicarbonate 325 MG tablet, Take 2 tablets (650 mg total) by mouth 2 (two) times daily., Disp: 120 tablet, Rfl: 6    SYNTHROID 200 mcg tablet, Take 1 tablet (200 mcg total) by mouth before breakfast., Disp: 90 tablet, Rfl: 3    traZODone (DESYREL) 50 MG tablet, Take 1 to 2 tablets ( mg total) by mouth nightly as needed for Insomnia., Disp: 120 tablet, Rfl: 3    REVIEW OF SYSTEMS:  General: negative; ENT: negative; Allergy and Immunology: negative; Hematological and Lymphatic: Negative; Endocrine: negative; Respiratory: no cough, shortness of breath, or wheezing; Cardiovascular: no chest pain or dyspnea on exertion; Gastrointestinal: no abdominal pain/back, change in bowel habits, or bloody stools; Genito-Urinary: no dysuria, trouble voiding, or hematuria; Musculoskeletal: negative  Neurological: no TIA or stroke symptoms; Psychiatric: no nervousness, anxiety or depression.    PHYSICAL EXAM:   Right Arm BP - Sittin/91 (22 1047)  Left Arm BP - Sittin/89 (22 1047)  Pulse: 73  Temp: 97.9 °F (36.6 °C)      General appearance:  Alert, well-appearing, and in no distress.  Oriented to person, place, and time   Neurological: Normal speech, no focal findings noted; CN II - XII grossly intact           Musculoskeletal: Digits/nail without cyanosis/clubbing.  Normal muscle strength/tone.                 Neck: Supple, no significant adenopathy; thyroid is not enlarged                Chest:  Clear to auscultation, no wheezes, rales or rhonchi, symmetric air entry     No use of accessory muscles             Cardiac: Normal rate and regular  rhythm, S1 and S2 normal; PMI non-displaced          Abdomen: Soft, nontender, nondistended, no masses or organomegaly     No rebound tenderness noted; bowel sounds normal     No groin adenopathy      Extremities:   3 cm scar overlying L antecubital fossa      Weak 1+ L radial pulse, 2+ L brachial pulse      Obese upper arm      + Scar's test     No ulcerations    LAB RESULTS:  Lab Results   Component Value Date    K 4.0 09/13/2022    K 4.3 09/12/2022    K 5.6 (H) 09/11/2022    CREATININE 3.6 (H) 09/13/2022    CREATININE 3.6 (H) 09/12/2022    CREATININE 3.8 (H) 09/11/2022     Lab Results   Component Value Date    WBC 10.78 09/13/2022    WBC 11.83 09/12/2022    WBC 12.70 09/07/2022    HCT 28.1 (L) 09/13/2022    HCT 31.9 (L) 09/12/2022    HCT 32.4 (L) 09/07/2022     (L) 09/13/2022     09/12/2022     09/07/2022     Lab Results   Component Value Date    HGBA1C 6.8 (H) 09/07/2022    HGBA1C 6.2 (H) 12/21/2021    HGBA1C 5.7 (H) 12/01/2020     IMAGING:    IMP/PLAN:     62 y.o. female with a h/o DMII with retinopathy, HTN, stroke and obesity (BMI 40), and multiple AKIs the last was from urinary retention in need of future AV access  No suitable L cephalic vein; L basilic also small  Weight loss   To avoid LUE AVG, plan for L 1st stage brachial artery/vein AVF. If needs HD sooner, will do LUE AVG      Tirso Tavera MD DFSVS FACS   Vascular/Endovascular Surgery

## 2022-09-29 DIAGNOSIS — Z98.890 S/P ARTERIOVENOUS (AV) FISTULA CREATION: ICD-10-CM

## 2022-09-29 DIAGNOSIS — N18.5 CKD (CHRONIC KIDNEY DISEASE), STAGE V: Primary | ICD-10-CM

## 2022-10-03 ENCOUNTER — TELEPHONE (OUTPATIENT)
Dept: TRANSPLANT | Facility: CLINIC | Age: 62
End: 2022-10-03
Payer: MEDICARE

## 2022-10-03 ENCOUNTER — LAB VISIT (OUTPATIENT)
Dept: LAB | Facility: HOSPITAL | Age: 62
End: 2022-10-03
Attending: INTERNAL MEDICINE
Payer: COMMERCIAL

## 2022-10-03 DIAGNOSIS — N18.5 ANEMIA OF CHRONIC RENAL FAILURE, STAGE 5: ICD-10-CM

## 2022-10-03 DIAGNOSIS — D63.1 ANEMIA OF CHRONIC RENAL FAILURE, STAGE 5: ICD-10-CM

## 2022-10-03 LAB
HCT VFR BLD AUTO: 29.2 % (ref 37–48.5)
HGB BLD-MCNC: 9.2 G/DL (ref 12–16)
IRON SERPL-MCNC: 24 UG/DL (ref 30–160)
SATURATED IRON: 8 % (ref 20–50)
TOTAL IRON BINDING CAPACITY: 314 UG/DL (ref 250–450)
TRANSFERRIN SERPL-MCNC: 212 MG/DL (ref 200–375)

## 2022-10-03 PROCEDURE — 36415 COLL VENOUS BLD VENIPUNCTURE: CPT | Mod: HCNC | Performed by: INTERNAL MEDICINE

## 2022-10-03 PROCEDURE — 85018 HEMOGLOBIN: CPT | Mod: HCNC | Performed by: INTERNAL MEDICINE

## 2022-10-03 PROCEDURE — 84466 ASSAY OF TRANSFERRIN: CPT | Mod: HCNC | Performed by: INTERNAL MEDICINE

## 2022-10-03 PROCEDURE — 85014 HEMATOCRIT: CPT | Mod: HCNC | Performed by: INTERNAL MEDICINE

## 2022-10-03 NOTE — TELEPHONE ENCOUNTER
Contacted patient to review initial intake information. Patient reports the followin. Can you walk up a flight of stairs without getting short of breath or stopping? No   2. Can you walk one block without getting short of breath or having to stop? No   3. Do you use oxygen? No   4. Do you use a cane, walker, or wheel chair to assist in mobility? No   5. Have you been hospitalized or had recent surgery in the last 6 months? No    A. Stroke   B. Heart surgery or heart catheterization   C. Broken bone  6. Do you have any cuts, open sores (ulcers), or wounds anywhere on your body? No  7. Do you go to dialysis? No   8. Preferred appointment day? Anyday  9. Caregiver? Daughter (Amarilis)/Son (Miles)    Patient is aware the next steps will include completing records and compliance verification. Patient is aware once provider review and insurance authorization is received we will contact patient to schedule initial visit. Patient is aware that initial visit will begin prior to / at 7 am and will conclude at approximately 3 pm on date of appointment. All questions answered at this time.

## 2022-10-03 NOTE — LETTER
October 3, 2022    Cuba Kimball  [address] [facility] [address]  Phone: [phone]  Fax: [phone]    Dear Dr. Cuba Kimball:    Patient: Kathie Quezada  MR Number [id number]  Date of Birth 1960    Thank you for your referral of Kathie Quezada to our transplant program.      Your patient's information will be screened by our Referral Nurse Coordinators to determine initial medical candidacy and if any further records are required. We will contact you if further information is needed to process the referral.     Once all needed information is received it will be forwarded to our Transplant Financial Coordinators who will obtain insurance authorization. Upon insurance approval, your patient will be contacted by our  to schedule their appointments with the transplant team. When appointments are made you will receive a copy of the appointment letter that your patient will receive.     This process may take two to six weeks to complete.     In the event your patient is not a candidate a copy of our transplant programs opinion including reasons will be returned to you to comply with your yearly review regulations. A copy of that letter will also be sent to the patient.     The following information is needed to process a referral:  Medicare form 2728 for all patients on dialysis.  Dental clearance is required if your patient has primary Medicaid.  Current immunization record.  This information can be faxed to [phone].  Current Dietician evaluation can be faxed to [phone].    Thank you again for your trust in our program and the care of your patient.  If there is anything we can do for you or your staff, please feel free to contact us at [phone].    Sincerely,   [facility]  [facility] Kidney/Pancreas Transplant Team  [address] [place] [address] [phone]

## 2022-10-05 ENCOUNTER — TELEPHONE (OUTPATIENT)
Dept: TRANSPLANT | Facility: CLINIC | Age: 62
End: 2022-10-05
Payer: MEDICARE

## 2022-10-10 ENCOUNTER — CLINICAL SUPPORT (OUTPATIENT)
Dept: NEPHROLOGY | Facility: CLINIC | Age: 62
End: 2022-10-10
Payer: COMMERCIAL

## 2022-10-10 DIAGNOSIS — N18.5 CHRONIC KIDNEY DISEASE (CKD), STAGE V: Primary | ICD-10-CM

## 2022-10-10 PROCEDURE — 96372 THER/PROPH/DIAG INJ SC/IM: CPT | Mod: HCNC,S$GLB,, | Performed by: INTERNAL MEDICINE

## 2022-10-10 PROCEDURE — 96372 PR INJECTION,THERAP/PROPH/DIAG2ST, IM OR SUBCUT: ICD-10-PCS | Mod: HCNC,S$GLB,, | Performed by: INTERNAL MEDICINE

## 2022-10-10 PROCEDURE — 99999 PR PBB SHADOW E&M-EST. PATIENT-LVL II: ICD-10-PCS | Mod: PBBFAC,HCNC,,

## 2022-10-10 PROCEDURE — 99999 PR PBB SHADOW E&M-EST. PATIENT-LVL II: CPT | Mod: PBBFAC,HCNC,,

## 2022-10-11 ENCOUNTER — HOSPITAL ENCOUNTER (OUTPATIENT)
Dept: RADIOLOGY | Facility: CLINIC | Age: 62
Discharge: HOME | End: 2022-10-11
Attending: INTERNAL MEDICINE
Payer: MEDICARE

## 2022-10-11 ENCOUNTER — PATIENT MESSAGE (OUTPATIENT)
Dept: TRANSPLANT | Facility: CLINIC | Age: 62
End: 2022-10-11
Payer: MEDICARE

## 2022-10-11 DIAGNOSIS — Z78.0 POSTMENOPAUSAL: ICD-10-CM

## 2022-10-11 DIAGNOSIS — Z76.82 ORGAN TRANSPLANT CANDIDATE: Primary | ICD-10-CM

## 2022-10-11 DIAGNOSIS — Z91.89 CARDIOVASCULAR EVENT RISK: ICD-10-CM

## 2022-10-11 PROBLEM — M85.89 OSTEOPENIA OF MULTIPLE SITES: Status: ACTIVE | Noted: 2022-10-11

## 2022-10-11 PROCEDURE — 77080 DEXA BONE DENSITY SPINE HIP: ICD-10-PCS | Mod: 26,TXP,, | Performed by: INTERNAL MEDICINE

## 2022-10-11 PROCEDURE — 77080 DXA BONE DENSITY AXIAL: CPT | Mod: 26,TXP,, | Performed by: INTERNAL MEDICINE

## 2022-10-11 PROCEDURE — 77080 DXA BONE DENSITY AXIAL: CPT | Mod: TC,HCNC,TXP

## 2022-10-12 VITALS
BODY MASS INDEX: 38.37 KG/M2 | DIASTOLIC BLOOD PRESSURE: 82 MMHG | WEIGHT: 267.44 LBS | OXYGEN SATURATION: 97 % | HEART RATE: 75 BPM | SYSTOLIC BLOOD PRESSURE: 140 MMHG

## 2022-10-12 NOTE — PROGRESS NOTES
Hgb 9.2 / Hct 29.2    1st Dose of Retacrit 93849 units administered. Per Form # [id number]. Pt educated on mediation and how it works. Pt had no further concerns or questions. Pt was asked to wait in office for 15 mins after injection, Tolerated injection well no reaction noted. 1 week appt given .      GFR 13.9

## 2022-10-14 ENCOUNTER — TELEPHONE (OUTPATIENT)
Dept: TRANSPLANT | Facility: CLINIC | Age: 62
End: 2022-10-14
Payer: MEDICARE

## 2022-10-26 ENCOUNTER — TELEPHONE (OUTPATIENT)
Dept: VASCULAR SURGERY | Facility: CLINIC | Age: 62
End: 2022-10-26
Payer: MEDICARE

## 2022-10-26 NOTE — TELEPHONE ENCOUNTER
Spoke with the pt and informed her of the change in arrival time to 730am.Pt verbalizes understanding of information received.

## 2022-10-26 NOTE — TELEPHONE ENCOUNTER
Spoke with the pt and informed her of the time of arrival for surgery tomorrow is 5am at the main campus on [address],second [facility], [facility].Pt also reminded not to eat or drink anything after midnight and she verbalized understanding of informattion received.

## 2022-10-26 NOTE — PRE-PROCEDURE INSTRUCTIONS
PreOp Instructions given:   - Verbal medication information (what to hold and what to take)   - NPO guidelines 2400  - Arrival place directions given; time to be given the day before procedure by the   Surgeon's Office [id number]-[id number]  - Bathing with antibacterial soap   - Don't wear any jewelry or bring any valuables AM of surgery   - No makeup or moisturizer to face   - No perfume/cologne, powder, lotions or aftershave   Pt. verbalized understanding.   Pt denies any h/o Anesthesia/Sedation complications or side effects.  Factor XI deficiency - FFP pre-op

## 2022-10-27 ENCOUNTER — ANESTHESIA (OUTPATIENT)
Dept: SURGERY | Facility: HOSPITAL | Age: 62
End: 2022-10-27
Payer: MEDICARE

## 2022-10-27 ENCOUNTER — HOSPITAL ENCOUNTER (OUTPATIENT)
Facility: HOSPITAL | Age: 62
Discharge: HOME | End: 2022-10-27
Attending: SURGERY | Admitting: SURGERY
Payer: MEDICARE

## 2022-10-27 ENCOUNTER — ANESTHESIA EVENT (OUTPATIENT)
Dept: SURGERY | Facility: HOSPITAL | Age: 62
End: 2022-10-27
Payer: MEDICARE

## 2022-10-27 VITALS
OXYGEN SATURATION: 93 % | BODY MASS INDEX: 38.22 KG/M2 | HEIGHT: 70 IN | WEIGHT: 267 LBS | RESPIRATION RATE: 20 BRPM | HEART RATE: 67 BPM | TEMPERATURE: 98 F | SYSTOLIC BLOOD PRESSURE: 140 MMHG | DIASTOLIC BLOOD PRESSURE: 75 MMHG

## 2022-10-27 DIAGNOSIS — N18.5 CHRONIC KIDNEY DISEASE (CKD), STAGE V: Primary | ICD-10-CM

## 2022-10-27 DIAGNOSIS — N18.5 CKD (CHRONIC KIDNEY DISEASE), STAGE V: ICD-10-CM

## 2022-10-27 LAB
ABO + RH BLD: NORMAL
BLD GP AB SCN CELLS X3 SERPL QL: NORMAL
POCT GLUCOSE: 105 MG/DL (ref 70–110)
POCT GLUCOSE: 122 MG/DL (ref 70–110)

## 2022-10-27 PROCEDURE — 25000003 PHARM REV CODE 250: Mod: NTX

## 2022-10-27 PROCEDURE — 86901 BLOOD TYPING SEROLOGIC RH(D): CPT | Mod: NTX | Performed by: SURGERY

## 2022-10-27 PROCEDURE — 36821 PR ANASTOMOSIS,AV,ANY SITE: ICD-10-PCS | Mod: NTX,,, | Performed by: SURGERY

## 2022-10-27 PROCEDURE — D9220A PRA ANESTHESIA: ICD-10-PCS | Mod: ANES,NTX,, | Performed by: ANESTHESIOLOGY

## 2022-10-27 PROCEDURE — 37000009 HC ANESTHESIA EA ADD 15 MINS: Mod: TXP | Performed by: SURGERY

## 2022-10-27 PROCEDURE — 36000706: Mod: NTX | Performed by: SURGERY

## 2022-10-27 PROCEDURE — D9220A PRA ANESTHESIA: ICD-10-PCS | Mod: CRNA,NTX,, | Performed by: NURSE ANESTHETIST, CERTIFIED REGISTERED

## 2022-10-27 PROCEDURE — D9220A PRA ANESTHESIA: Mod: CRNA,NTX,, | Performed by: NURSE ANESTHETIST, CERTIFIED REGISTERED

## 2022-10-27 PROCEDURE — 25000003 PHARM REV CODE 250: Mod: TXP | Performed by: NURSE ANESTHETIST, CERTIFIED REGISTERED

## 2022-10-27 PROCEDURE — 63600175 PHARM REV CODE 636 W HCPCS: Mod: NTX | Performed by: SURGERY

## 2022-10-27 PROCEDURE — 82962 GLUCOSE BLOOD TEST: CPT | Mod: NTX | Performed by: SURGERY

## 2022-10-27 PROCEDURE — 82962 GLUCOSE BLOOD TEST: CPT | Mod: TXP | Performed by: SURGERY

## 2022-10-27 PROCEDURE — 63600175 PHARM REV CODE 636 W HCPCS: Mod: TXP | Performed by: SURGERY

## 2022-10-27 PROCEDURE — 27201423 OPTIME MED/SURG SUP & DEVICES STERILE SUPPLY: Mod: NTX | Performed by: SURGERY

## 2022-10-27 PROCEDURE — 71000015 HC POSTOP RECOV 1ST HR: Mod: TXP | Performed by: SURGERY

## 2022-10-27 PROCEDURE — 37000008 HC ANESTHESIA 1ST 15 MINUTES: Mod: NTX | Performed by: SURGERY

## 2022-10-27 PROCEDURE — 71000044 HC DOSC ROUTINE RECOVERY FIRST HOUR: Mod: NTX | Performed by: SURGERY

## 2022-10-27 PROCEDURE — D9220A PRA ANESTHESIA: Mod: ANES,NTX,, | Performed by: ANESTHESIOLOGY

## 2022-10-27 PROCEDURE — 36821 AV FUSION DIRECT ANY SITE: CPT | Mod: NTX,,, | Performed by: SURGERY

## 2022-10-27 PROCEDURE — 36000707: Mod: NTX | Performed by: SURGERY

## 2022-10-27 PROCEDURE — 25000003 PHARM REV CODE 250: Mod: NTX | Performed by: STUDENT IN AN ORGANIZED HEALTH CARE EDUCATION/TRAINING PROGRAM

## 2022-10-27 PROCEDURE — A4216 STERILE WATER/SALINE, 10 ML: HCPCS | Mod: TXP | Performed by: NURSE ANESTHETIST, CERTIFIED REGISTERED

## 2022-10-27 PROCEDURE — 63600175 PHARM REV CODE 636 W HCPCS: Mod: NTX | Performed by: NURSE ANESTHETIST, CERTIFIED REGISTERED

## 2022-10-27 RX ORDER — HYDROCODONE BITARTRATE AND ACETAMINOPHEN 5; 325 MG/1; MG/1
1 TABLET ORAL EVERY 4 HOURS PRN
Status: DISCONTINUED | OUTPATIENT
Start: 2022-10-27 | End: 2022-10-27 | Stop reason: HOSPADM

## 2022-10-27 RX ORDER — ONDANSETRON 2 MG/ML
4 INJECTION INTRAMUSCULAR; INTRAVENOUS DAILY PRN
Status: DISCONTINUED | OUTPATIENT
Start: 2022-10-27 | End: 2022-10-27 | Stop reason: HOSPADM

## 2022-10-27 RX ORDER — PAPAVERINE HYDROCHLORIDE 30 MG/ML
INJECTION INTRAMUSCULAR; INTRAVENOUS
Status: DISCONTINUED | OUTPATIENT
Start: 2022-10-27 | End: 2022-10-27 | Stop reason: HOSPADM

## 2022-10-27 RX ORDER — SODIUM CHLORIDE 9 MG/ML
INJECTION, SOLUTION INTRAVENOUS CONTINUOUS
Status: DISCONTINUED | OUTPATIENT
Start: 2022-10-27 | End: 2022-10-27 | Stop reason: HOSPADM

## 2022-10-27 RX ORDER — HEPARIN SODIUM 1000 [USP'U]/ML
INJECTION, SOLUTION INTRAVENOUS; SUBCUTANEOUS
Status: DISCONTINUED | OUTPATIENT
Start: 2022-10-27 | End: 2022-10-27 | Stop reason: HOSPADM

## 2022-10-27 RX ORDER — OXYCODONE HYDROCHLORIDE 5 MG/1
5 TABLET ORAL
Status: DISCONTINUED | OUTPATIENT
Start: 2022-10-27 | End: 2022-10-27 | Stop reason: HOSPADM

## 2022-10-27 RX ORDER — CLINDAMYCIN PHOSPHATE 900 MG/50ML
900 INJECTION, SOLUTION INTRAVENOUS
Status: COMPLETED | OUTPATIENT
Start: 2022-10-27 | End: 2022-10-27

## 2022-10-27 RX ORDER — HYDROMORPHONE HYDROCHLORIDE 1 MG/ML
0.5 INJECTION, SOLUTION INTRAMUSCULAR; INTRAVENOUS; SUBCUTANEOUS EVERY 10 MIN PRN
Status: DISCONTINUED | OUTPATIENT
Start: 2022-10-27 | End: 2022-10-27 | Stop reason: HOSPADM

## 2022-10-27 RX ORDER — PROPOFOL 10 MG/ML
VIAL (ML) INTRAVENOUS
Status: DISCONTINUED | OUTPATIENT
Start: 2022-10-27 | End: 2022-10-27

## 2022-10-27 RX ORDER — FENTANYL CITRATE 50 UG/ML
25 INJECTION, SOLUTION INTRAMUSCULAR; INTRAVENOUS EVERY 5 MIN PRN
Status: DISCONTINUED | OUTPATIENT
Start: 2022-10-27 | End: 2022-10-27 | Stop reason: HOSPADM

## 2022-10-27 RX ORDER — PROCHLORPERAZINE EDISYLATE 5 MG/ML
5 INJECTION INTRAMUSCULAR; INTRAVENOUS EVERY 30 MIN PRN
Status: DISCONTINUED | OUTPATIENT
Start: 2022-10-27 | End: 2022-10-27 | Stop reason: HOSPADM

## 2022-10-27 RX ORDER — LIDOCAINE HYDROCHLORIDE 10 MG/ML
INJECTION, SOLUTION INTRAVENOUS
Status: DISCONTINUED | OUTPATIENT
Start: 2022-10-27 | End: 2022-10-27

## 2022-10-27 RX ORDER — MIDAZOLAM HYDROCHLORIDE 1 MG/ML
INJECTION, SOLUTION INTRAMUSCULAR; INTRAVENOUS
Status: DISCONTINUED | OUTPATIENT
Start: 2022-10-27 | End: 2022-10-27

## 2022-10-27 RX ORDER — FENTANYL CITRATE 50 UG/ML
INJECTION, SOLUTION INTRAMUSCULAR; INTRAVENOUS
Status: DISCONTINUED | OUTPATIENT
Start: 2022-10-27 | End: 2022-10-27

## 2022-10-27 RX ORDER — PROPOFOL 10 MG/ML
VIAL (ML) INTRAVENOUS CONTINUOUS PRN
Status: DISCONTINUED | OUTPATIENT
Start: 2022-10-27 | End: 2022-10-27

## 2022-10-27 RX ORDER — PROTAMINE SULFATE 10 MG/ML
INJECTION, SOLUTION INTRAVENOUS
Status: DISCONTINUED | OUTPATIENT
Start: 2022-10-27 | End: 2022-10-27

## 2022-10-27 RX ADMIN — HEPARIN SODIUM 8000 UNITS: 1000 INJECTION, SOLUTION INTRAVENOUS; SUBCUTANEOUS at 11:10

## 2022-10-27 RX ADMIN — MEPIVACAINE HYDROCHLORIDE 30 ML: 15 INJECTION, SOLUTION EPIDURAL; INFILTRATION at 10:10

## 2022-10-27 RX ADMIN — MIDAZOLAM HYDROCHLORIDE 2 MG: 1 INJECTION, SOLUTION INTRAMUSCULAR; INTRAVENOUS at 09:10

## 2022-10-27 RX ADMIN — PROTAMINE SULFATE 5 MG: 10 INJECTION, SOLUTION INTRAVENOUS at 12:10

## 2022-10-27 RX ADMIN — Medication 100 MCG/KG/MIN: at 10:10

## 2022-10-27 RX ADMIN — SODIUM CHLORIDE: 0.9 INJECTION, SOLUTION INTRAVENOUS at 09:10

## 2022-10-27 RX ADMIN — PROPOFOL 30 MG: 10 INJECTION, EMULSION INTRAVENOUS at 10:10

## 2022-10-27 RX ADMIN — LIDOCAINE HYDROCHLORIDE 50 MG: 10 INJECTION, SOLUTION INTRAVENOUS at 10:10

## 2022-10-27 RX ADMIN — TACROLIMUS 900 MG: 0.5 CAPSULE ORAL at 10:10

## 2022-10-27 RX ADMIN — PROTAMINE SULFATE 35 MG: 10 INJECTION, SOLUTION INTRAVENOUS at 12:10

## 2022-10-27 RX ADMIN — DEXMEDETOMIDINE HYDROCHLORIDE 8 MCG: 100 INJECTION, SOLUTION, CONCENTRATE INTRAVENOUS at 10:10

## 2022-10-27 RX ADMIN — HYDROCODONE BITARTRATE AND ACETAMINOPHEN 1 TABLET: 5; 325 TABLET ORAL at 02:10

## 2022-10-27 RX ADMIN — FENTANYL CITRATE 50 MCG: 50 INJECTION, SOLUTION INTRAMUSCULAR; INTRAVENOUS at 09:10

## 2022-10-27 NOTE — ANESTHESIA PREPROCEDURE EVALUATION
10/27/2022  Kathie Quezada is a 62 y.o., female.    Patient Active Problem List    Diagnosis Date Noted    Osteopenia of multiple sites 10/11/2022    Chronic kidney disease (CKD), stage V 09/20/2022    Type 2 diabetes mellitus with stage 4 chronic kidney disease, without long-term current use of insulin 09/07/2022    Myalgia due to statin 09/07/2022    Normocytic anemia 01/14/2022    Controlled type 2 diabetes mellitus with stable proliferative retinopathy of both eyes, unspecified whether long term insulin use     Diabetic polyneuropathy associated with type 2 diabetes mellitus 12/04/2020    Class 2 severe obesity due to excess calories with serious comorbidity and body mass index (BMI) of 35.0 to 35.9 in adult 05/13/2020    Generalized anxiety disorder 11/08/2019    S/P kyphoplasty 08/14/2019    Vitamin D deficiency 07/31/2019    Chronic diastolic heart failure 02/07/2019    History of hepatitis C     CKD (chronic kidney disease) stage 4, GFR 15-29 ml/min 05/16/2018    Postoperative hypothyroidism 04/09/2018    Renovascular hypertension 04/09/2018    Mixed hyperlipidemia 04/09/2018    Nephrotic range proteinuria 04/09/2018    Factor XI deficiency 01/01/1978           Pre-op Assessment     I have reviewed the Nursing Notes.       Review of Systems  Anesthesia Hx:  No problems with previous Anesthesia    Social:  Non-Smoker, No Alcohol Use    Hematology/Oncology:         -- Anemia: Hematology Comments: Factor XI deficiency   Cardiovascular:   Exercise tolerance: good Denies Pacemaker. Hypertension Valvular problems/Murmurs (Mild MR), MR  CHF (one clinical episode in 1996) hyperlipidemia ECG has been reviewed. February 2022 TTE reviewed   Pulmonary:  Pulmonary Normal    Renal/:   Chronic Renal Disease, CRI    Hepatic/GI:   No Bowel Prep. Denies PUD. GERD Liver Disease, Hepatitis,  C    Neurological:   CVA, no residual symptoms Denies Seizures.    Endocrine:   Diabetes, type 2 Hypothyroidism  Obesity / BMI > 30  Psych:   Psychiatric History anxiety          Physical Exam  General: Well nourished, Cooperative and Alert    Airway:  Mallampati: IV   Mouth Opening: Normal  TM Distance: Normal  Tongue: Normal  Neck ROM: Normal ROM    Dental:  Periodontal disease    Chest/Lungs:  Normal Respiratory Rate    Heart:  Rate: Normal        Anesthesia Plan  Type of Anesthesia, risks & benefits discussed:    Anesthesia Type: Regional  Intra-op Monitoring Plan: Standard ASA Monitors  Post Op Pain Control Plan: peripheral nerve block and multimodal analgesia  Informed Consent: Informed consent signed with the Patient and all parties understand the risks and agree with anesthesia plan.  All questions answered.   ASA Score: 3  Day of Surgery Review of History & Physical: H&P Update referred to the surgeon/provider.    Ready For Surgery From Anesthesia Perspective.     .

## 2022-10-27 NOTE — BRIEF OP NOTE
Brief Operative Note  Date: 10/27/2022    Pre-op Diagnosis:  CKD (chronic kidney disease), stage V [N18.5]    Post-op Diagnosis:  Same    Procedure(s):  Creation of 1st stage L BVT AVF    Surgeon: JASON Tavera MD [facility] FACS    Assistant: Daniel Brito MD - Fellow    Anesthesia: Regional    Findings/Key Components:  L basilic vein dilated to 3-4mm; segment of 8-10cm was mobilized at a/c fossa  Strong thrill  Triphasic L ulnar doppler signal with minimal augmentation with AVF compression  Obese upper arm (BMI 38)  Consider transposition in 3-6months+    EBL: < 10 ml      Specimens (From admission, onward)      None          I attest to being present for the procedure and performing the case.  Tirso Tavera MD [facility] FACS  Discharge Note  SUMMARY    Admit Date: 10/27/2022    Attending Physician: Tirso Tavera MD FACS    Discharge Physician: Tirso Tavera MD FACS    Discharge Date: 10/27/2022    Final Diagnosis: CKD (chronic kidney disease), stage V [N18.5]    Outcome of Treatment: Successful AVF    Disposition: Home or self-care    Patient Instructions:   Discharge Medication List as of 10/27/2022  1:31 PM        CONTINUE these medications which have NOT CHANGED    Details   amLODIPine (NORVASC) 10 MG tablet Take 1 tablet (10 mg total) by mouth once daily., Starting Mon 9/19/2022, Normal      carvediloL (COREG) 25 MG tablet Take 1 tablet (25 mg total) by mouth 2 (two) times daily with meals., Starting Mon 9/19/2022, Normal      cholecalciferol, vitamin D3, 125 mcg (5,000 unit) Tab Take 1 tablet (5,000 Units total) by mouth once daily., Starting Tue 9/13/2022, Normal      EScitalopram oxalate (LEXAPRO) 10 MG tablet Take 1 tablet (10 mg total) by mouth once daily., Starting Mon 9/19/2022, Normal      furosemide (LASIX) 40 MG tablet Take 1 tablet (40 mg total) by mouth once daily., Starting Mon 9/19/2022, Normal      levocetirizine (XYZAL) 5 MG tablet Take 1 tablet (5 mg total) by mouth daily as needed for Allergies., Starting  Mon 9/19/2022, Normal      ondansetron (ZOFRAN-ODT) 4 MG TbDL Take 1 tablet (4 mg total) by mouth every 8 (eight) hours as needed (nausea)., Starting Tue 8/9/2022, Normal      sodium bicarbonate 325 MG tablet Take 2 tablets (650 mg total) by mouth 2 (two) times daily., Starting Tue 9/20/2022, Until Wed 9/20/2023, Normal      SYNTHROID 200 mcg tablet Take 1 tablet (200 mcg total) by mouth before breakfast., Starting Mon 9/19/2022, Normal      traZODone (DESYREL) 50 MG tablet Take 1 to 2 tablets ( mg total) by mouth nightly as needed for Insomnia., Starting Mon 9/19/2022, Normal             Diet:  Resume pre-operative diet    Activity:  Ad oli    Follow-up:  Follow-up in clinic with Dr Tavera within 4 weeks; please call clinic nurse at

## 2022-10-27 NOTE — INTERVAL H&P NOTE
The patient has been examined and the H&P has been reviewed:    I concur with the findings and no changes have occurred since H&P was written.    Surgery risks, benefits and alternative options discussed and understood by patient/family.    Pt notes hx of Factor XI deficiency, NTD as she is asymptomatic.  Will proceed with surgery today.          There are no hospital problems to display for this patient.

## 2022-10-27 NOTE — TRANSFER OF CARE
"Anesthesia Transfer of Care Note    Patient: Kathie Quezada    Procedure(s) Performed: Procedure(s) (LRB):  CREATION, AV FISTULA (Left)    Patient location: [facility]    Transport from OR: Transported from OR on 6-10 L/min O2 by face mask with adequate spontaneous ventilation    Post pain: adequate analgesia    Post assessment: no apparent anesthetic complications    Post vital signs: stable    Level of consciousness: awake    Nausea/Vomiting: no nausea/vomiting    Complications: none    Transfer of care protocol was followed      Last vitals:   Visit Vitals  BP (!) 173/81 (BP Location: Right arm, Patient Position: Lying)   Pulse 65   Temp 36.7 °C (98 °F) (Oral)   Resp 18   Ht 5' 10" (1.778 m)   Wt 121.1 kg (267 lb)   SpO2 98%   Breastfeeding No   BMI 38.31 kg/m²     "

## 2022-10-27 NOTE — ANESTHESIA PROCEDURE NOTES
Left Suprclavicular with ICB Single Shot    Patient location during procedure: OR    Reason for block: primary anesthetic    Diagnosis: Left AVF   Start time: 10/27/2022 10:04 AM  Timeout: 10/27/2022 10:03 AM   End time: 10/27/2022 10:12 AM    Staffing  Authorizing Provider: Dheeraj North MD  Performing Provider: Elan Gonzalez DO    Preanesthetic Checklist  Completed: patient identified, IV checked, site marked, risks and benefits discussed, surgical consent, monitors and equipment checked, pre-op evaluation and timeout performed  Peripheral Block  Patient position: supine  Prep: ChloraPrep  Patient monitoring: heart rate, cardiac monitor, continuous pulse ox, continuous capnometry and frequent blood pressure checks  Block type: supraclavicular  Laterality: left  Injection technique: single shot  Needle  Needle type: Stimuplex   Needle gauge: 22 G  Needle length: 2 in  Needle localization: anatomical landmarks and ultrasound guidance   -ultrasound image captured on disc.  Assessment  Injection assessment: negative aspiration, negative parasthesia and local visualized surrounding nerve  Paresthesia pain: none  Heart rate change: no  Slow fractionated injection: yes  Pain Tolerance: comfortable throughout block and no complaints  Medications:    Medications: mepivacaine (CARBOCAINE) injection 15 mg/mL (1.5%) - Perineural   30 mL - 10/27/2022 10:10:00 AM    Additional Notes  Intercostal brachial field block performed with mepivacaine 1.5% 10ml for additional coverage.    VSS.  See anesthesia record.  Patient tolerated procedure well.

## 2022-10-27 NOTE — PLAN OF CARE
Dr. Tavera notified of patient having Factor XI deficiency and typically receiving FFP before surgery. Type and screen ordered. Blood consent completed.    
Notified resident Sirena Lombardi MD for clarification regarding if FFP needed to be ordered. No new orders given.   
Patient is stable and ready for discharge. Instructions and  given to patient and family. Questions answered. Patient tolerating po liquids with no difficulty. Patient has no pain or states it is a tolerable level for them. Anesthesia consent and surgical consent in chart.   
05-Feb-2019 03:24

## 2022-10-27 NOTE — ANESTHESIA POSTPROCEDURE EVALUATION
Anesthesia Post Evaluation    Patient: Kathie Quezada    Procedure(s) Performed: Procedure(s) (LRB):  CREATION, AV FISTULA (Left)    Final Anesthesia Type: regional      Patient location during evaluation: [facility]  Patient participation: Yes- Able to Participate  Level of consciousness: awake and alert  Post-procedure vital signs: reviewed and stable  Pain management: adequate  Airway patency: patent  SERA mitigation strategies: Multimodal analgesia and Use of major conduction anesthesia (spinal/epidural) or peripheral nerve block  PONV status at discharge: No PONV  Anesthetic complications: no      Cardiovascular status: stable  Respiratory status: unassisted and spontaneous ventilation  Hydration status: euvolemic  Follow-up not needed.          Vitals Value Taken Time   /75 10/27/22 1401   Temp 36.8 °C (98.2 °F) 10/27/22 1400   Pulse 67 10/27/22 1402   Resp 23 10/27/22 1401   SpO2 92 % 10/27/22 1402   Vitals shown include unvalidated device data.      No case tracking events are documented in the log.      Pain/Neda Score: Pain Rating Prior to Med Admin: 4 (10/27/2022  2:00 PM)  Neda Score: 9 (10/27/2022  1:05 PM)

## 2022-10-28 ENCOUNTER — TELEPHONE (OUTPATIENT)
Dept: VASCULAR SURGERY | Facility: CLINIC | Age: 62
End: 2022-10-28
Payer: MEDICARE

## 2022-10-28 RX ORDER — HYDROCODONE BITARTRATE AND ACETAMINOPHEN 5; 325 MG/1; MG/1
1 TABLET ORAL EVERY 6 HOURS PRN
Qty: 15 TABLET | Refills: 0 | Status: SHIPPED | OUTPATIENT
Start: 2022-10-28 | End: 2022-10-28

## 2022-10-28 RX ORDER — HYDROCODONE BITARTRATE AND ACETAMINOPHEN 5; 325 MG/1; MG/1
1 TABLET ORAL EVERY 6 HOURS PRN
Qty: 15 TABLET | Refills: 0 | Status: SHIPPED | OUTPATIENT
Start: 2022-10-28 | End: 2023-01-04

## 2022-10-28 NOTE — TELEPHONE ENCOUNTER
"Received a call from the pt c/o "suffering in pain all night"and was told to "take tylenol and ibuprofen" upon d/c from the hospital.Pt also states that her b/p going up due to pain.Daryl Obrien,[facility] and Niya Gruber NP notified of above.Informed them to send to [facility] [employer].Pt informed med to be sent to the pharmacy per Dr Castelan.  "

## 2022-10-29 NOTE — OP NOTE
Date: 10/27/2022     Pre-op Diagnosis:  CKD (chronic kidney disease), stage V [N18.5]     Post-op Diagnosis:  Same     Procedure(s):  Creation of 1st stage L BVT AVF     Surgeon: JASON Tavera MD DFSVS FACS     Assistant: Daniel Brito MD - Fellow     Anesthesia: Regional     Findings/Key Components:  L basilic vein dilated to 3-4mm; segment of 8-10cm was mobilized at a/c fossa  Strong thrill  Triphasic L ulnar doppler signal with minimal augmentation with AVF compression  Obese upper arm (BMI 38)  Consider transposition in 3-6months+     EBL: < 10 ml      Specimens (From admission, onward)        None            Procedure in Detail:  After informed consent was obtained the patient was taken to the OR in supine position. Pre-operative antibiotics were administered. regional anesthesia was administered by the anesthesia team. The left arm was prepped and draped in normal sterile fashion with chloraprep.  A time out was performed to confirm appropriate patient, site, positioning, and laterality. An transvers incision was made at the antecubital fossa just above the crease. Notably the patient's arm was extremely obese with copious adipose tissue and extra skin. The incision was deepened with a combination of electrocautery, blunt dissection, and sharp dissection. The basilic vein was identified on the lateral aspect of the incision and measured at least 3mm. It was encircled with a large vessel loop and dissected proximally and distally within the incision. Branches were ligated with 3-0 silk ties and small hand-applied clips. Next we dissected through the biceps aponeurosis and identified the brachial artery in the lateral aspect of the incision. The artery was healthy and measured 6mm. The artery was dissected circumferentially and controlled proximally and distally with large vessel loops. 3000 units of heparin were then administered intravenously. A small bulldog clamp was then applied to the proximal cephalic vein. The  distal cephalic vein was then clamped and divided and its distal end suture ligated with 3-0 silk suture. The bulldog clamp was released from the vein and the vein was flushed with heparinized saline. The vein flushed easily and distended well. The bulldog clamp was reapplied to the proximal vein and the distal end of the vein was spatulated. Clamps were applied distally and proximally to the brachial artery and a 6mm longitudinal arteriotomy was made in the brachial artery. The vein was sewn to the artery using a running 6-0 prolene suture and parachute technique. The artery was back bleed proximally and distally prior to completion of the anastamosis and had brisk flow with no thrombus visualized. The lumen was flushed with heparinized saline and the anastomosis was completed. The distal arterial clamp was released followed by the vein clamp and finally the proximal arterial clamp. No repair sutures were necessary. There was a palpable continuous thrill over the fistula. The radial artery had a triphasic doppler signal. Deep tissue protamine was administered. The wound was thoroughly irrigated with saline irrigation. The deep dermis was closed with interrupted vicryl sutures and interupted vertical mattress nylon was used to close the skin. The incisions were dressed with 4x4 gauze secured in place with tegaderm. Will plan to return to the OR to transpose the fistula in 4-6 weeks IF the fistula matures on surveillance ultrasound. All sponge needle and instrument counts were correct.     The patient tolerated the procedure well and was transported to the PACU for recovery.     Daniel Brito MD PGY7  Vascular Surgery Fellow  [phone]    Tirso Tavera MD DFS FACS   Vascular/Endovascular Surgery

## 2022-11-02 ENCOUNTER — PATIENT OUTREACH (OUTPATIENT)
Dept: ADMINISTRATIVE | Facility: HOSPITAL | Age: 62
End: 2022-11-02
Payer: MEDICARE

## 2022-11-10 ENCOUNTER — TELEPHONE (OUTPATIENT)
Dept: TRANSPLANT | Facility: CLINIC | Age: 62
End: 2022-11-10
Payer: MEDICARE

## 2022-11-10 NOTE — LETTER
Date: 11/10/2022          Pre-Dialysis      To: Dialysis Unit  and Charge RN From: [facility] and      [facility]ney Trans[facility] Nurse Coordinators    RE: Kathie Talleymaira Quezada, 1960, [id number]     At [facility], we conduct adherence checks as an important part of transplant care. Initial and listed patient assessments are not complete without adherence information.        Please complete the following information:                 Data from the last 3 months:  (data from last 3 months preferred):    Number of AMAs with dates, time, and reasons: ____________________________________________________    ______________________________________________________________________________________________    ______________________________________________________________________________________________    Number of No-Shows with dates and reasons: ______________________________________________________      ______________________________________________________________________________________________      Any concerns with Caregivers:  YES / NO    If yes, please explain:  ___________________________________________________________________________    ______________________________________________________________________________________________     Any concerns with Transportation:  YES / NO    If yes, please explain:  ___________________________________________________________________________    ______________________________________________________________________________________________    Any Psychiatric and/or Psychosocial concerns:  YES / NO     If yes, please explain: ___________________________________________________________________________    ______________________________________________________________________________________________      PLEASE RETURN TO: FAX: [phone]     Thank you for collaborating with us in the care of this patient.            [address]  ?  [place] [id number]  ?  phone [phone]  ?  fax [phone]  ?  [email or web address]  Confidentiality notice: The accompanying facsimile is intended solely for the use of the recipient designated above. Document(s) transmitted herewith may contain information that is confidential and privileged. Delivery, distribution or dissemination of this communication other than to the intended recipient is strictly prohibited. If you have received this facsimile in error, please notify us immediately by telephone.

## 2022-11-10 NOTE — LETTER
Date: 11/10/2022          Referral Process      To: Dialysis Unit  and Charge RN From: [facility] and      Kidney Transplant Nurse Coordinators    RE: Kathie Kemaira Quezada, 1960, [id number]     At [facility], we conduct adherence checks as an important part of transplant care. Initial and listed patient assessments are not complete without adherence information.        Please complete the following information:                 Data from the last 3 months:  (data from last 3 months preferred):    Number of AMAs with dates, time, and reasons: ____________________________________________________    ______________________________________________________________________________________________    ______________________________________________________________________________________________    Number of No-Shows with dates and reasons: ______________________________________________________      ______________________________________________________________________________________________      Any concerns with Caregivers:  YES / NO    If yes, please explain:  ___________________________________________________________________________    ______________________________________________________________________________________________     Any concerns with Transportation:  YES / NO    If yes, please explain:  ___________________________________________________________________________    ______________________________________________________________________________________________    Any Psychiatric and/or Psychosocial concerns:  YES / NO     If yes, please explain: ___________________________________________________________________________    ______________________________________________________________________________________________      PLEASE RETURN TO: FAX: [phone]     Thank you for collaborating with us in the care of this patient.            [address]  ?  [place] [address]  ?  phone [phone]  ?  fax [phone]  ?  [email or web address]  Confidentiality notice: The accompanying facsimile is intended solely for the use of the recipient designated above. Document(s) transmitted herewith may contain information that is confidential and privileged. Delivery, distribution or dissemination of this communication other than to the intended recipient is strictly prohibited. If you have received this facsimile in error, please notify us immediately by telephone.

## 2022-11-16 NOTE — PROGRESS NOTES
Verified demographics, appt times, lab work including HIV and Hepatitis which is required for transplantation. Informed patient that a caregiver is required and to bring a light snack. Assessed for  needs. Noted patient does not require . Updated in Epic:  health/surgical history, allergies, and family history. Patient states understanding , given opportunity to ask questions and all questions answered. Reminded patient to refer to appointment slips and education information sent previously.

## 2022-11-17 ENCOUNTER — OFFICE VISIT (OUTPATIENT)
Dept: TRANSPLANT | Facility: CLINIC | Age: 62
End: 2022-11-17
Payer: MEDICARE

## 2022-11-17 ENCOUNTER — HOSPITAL ENCOUNTER (OUTPATIENT)
Dept: RADIOLOGY | Facility: HOSPITAL | Age: 62
Discharge: HOME | End: 2022-11-17
Attending: NURSE PRACTITIONER
Payer: MEDICARE

## 2022-11-17 VITALS
BODY MASS INDEX: 38.83 KG/M2 | RESPIRATION RATE: 18 BRPM | TEMPERATURE: 97 F | WEIGHT: 256.19 LBS | OXYGEN SATURATION: 97 % | DIASTOLIC BLOOD PRESSURE: 77 MMHG | HEIGHT: 68 IN | HEART RATE: 73 BPM | SYSTOLIC BLOOD PRESSURE: 174 MMHG

## 2022-11-17 DIAGNOSIS — N18.5 CHRONIC KIDNEY DISEASE (CKD), STAGE V: ICD-10-CM

## 2022-11-17 DIAGNOSIS — E11.22 TYPE 2 DIABETES MELLITUS WITH STAGE 5 CHRONIC KIDNEY DISEASE NOT ON CHRONIC DIALYSIS, WITHOUT LONG-TERM CURRENT USE OF INSULIN: ICD-10-CM

## 2022-11-17 DIAGNOSIS — Z86.19 HISTORY OF HEPATITIS C: ICD-10-CM

## 2022-11-17 DIAGNOSIS — Z76.82 ORGAN TRANSPLANT CANDIDATE: ICD-10-CM

## 2022-11-17 DIAGNOSIS — N18.5 TYPE 2 DIABETES MELLITUS WITH STAGE 5 CHRONIC KIDNEY DISEASE NOT ON CHRONIC DIALYSIS, WITHOUT LONG-TERM CURRENT USE OF INSULIN: ICD-10-CM

## 2022-11-17 DIAGNOSIS — I15.0 RENOVASCULAR HYPERTENSION: ICD-10-CM

## 2022-11-17 DIAGNOSIS — Z01.818 PRE-TRANSPLANT EVALUATION FOR KIDNEY TRANSPLANT: Primary | ICD-10-CM

## 2022-11-17 PROCEDURE — 76700 US ABDOMEN COMPLETE: ICD-10-PCS | Mod: 26,TXP,, | Performed by: RADIOLOGY

## 2022-11-17 PROCEDURE — 3066F PR DOCUMENTATION OF TREATMENT FOR NEPHROPATHY: ICD-10-PCS | Mod: CPTII,S$GLB,TXP, | Performed by: NURSE PRACTITIONER

## 2022-11-17 PROCEDURE — 99499 RISK ADDL DX/OHS AUDIT: ICD-10-PCS | Mod: HCNC,S$GLB,TXP, | Performed by: NURSE PRACTITIONER

## 2022-11-17 PROCEDURE — 99499 UNLISTED E&M SERVICE: CPT | Mod: HCNC,S$GLB,TXP, | Performed by: NURSE PRACTITIONER

## 2022-11-17 PROCEDURE — 93978 US DOPP ILIACS BILATERAL: ICD-10-PCS | Mod: 26,TXP,, | Performed by: RADIOLOGY

## 2022-11-17 PROCEDURE — 3008F PR BODY MASS INDEX (BMI) DOCUMENTED: ICD-10-PCS | Mod: CPTII,S$GLB,TXP, | Performed by: NURSE PRACTITIONER

## 2022-11-17 PROCEDURE — 99499 RISK ADDL DX/OHS AUDIT: ICD-10-PCS | Mod: HCNC,S$GLB,TXP, | Performed by: TRANSPLANT SURGERY

## 2022-11-17 PROCEDURE — 99205 PR OFFICE/OUTPT VISIT, NEW, LEVL V, 60-74 MIN: ICD-10-PCS | Mod: S$GLB,TXP,, | Performed by: NURSE PRACTITIONER

## 2022-11-17 PROCEDURE — 99205 OFFICE O/P NEW HI 60 MIN: CPT | Mod: S$GLB,TXP,, | Performed by: NURSE PRACTITIONER

## 2022-11-17 PROCEDURE — 1159F PR MEDICATION LIST DOCUMENTED IN MEDICAL RECORD: ICD-10-PCS | Mod: CPTII,S$GLB,TXP, | Performed by: NURSE PRACTITIONER

## 2022-11-17 PROCEDURE — 3077F PR MOST RECENT SYSTOLIC BLOOD PRESSURE >= 140 MM HG: ICD-10-PCS | Mod: CPTII,S$GLB,TXP, | Performed by: NURSE PRACTITIONER

## 2022-11-17 PROCEDURE — 93978 VASCULAR STUDY: CPT | Mod: 26,TXP,, | Performed by: RADIOLOGY

## 2022-11-17 PROCEDURE — 3078F DIAST BP <80 MM HG: CPT | Mod: CPTII,S$GLB,TXP, | Performed by: NURSE PRACTITIONER

## 2022-11-17 PROCEDURE — 99999 PR PBB SHADOW E&M-EST. PATIENT-LVL V: CPT | Mod: PBBFAC,TXP,, | Performed by: NURSE PRACTITIONER

## 2022-11-17 PROCEDURE — 1160F RVW MEDS BY RX/DR IN RCRD: CPT | Mod: CPTII,S$GLB,TXP, | Performed by: NURSE PRACTITIONER

## 2022-11-17 PROCEDURE — 3008F BODY MASS INDEX DOCD: CPT | Mod: CPTII,S$GLB,TXP, | Performed by: NURSE PRACTITIONER

## 2022-11-17 PROCEDURE — 1160F PR REVIEW ALL MEDS BY PRESCRIBER/CLIN PHARMACIST DOCUMENTED: ICD-10-PCS | Mod: CPTII,S$GLB,TXP, | Performed by: NURSE PRACTITIONER

## 2022-11-17 PROCEDURE — 93978 VASCULAR STUDY: CPT | Mod: TC,TXP

## 2022-11-17 PROCEDURE — 3077F SYST BP >= 140 MM HG: CPT | Mod: CPTII,S$GLB,TXP, | Performed by: NURSE PRACTITIONER

## 2022-11-17 PROCEDURE — 99499 UNLISTED E&M SERVICE: CPT | Mod: HCNC,S$GLB,TXP, | Performed by: TRANSPLANT SURGERY

## 2022-11-17 PROCEDURE — 1159F MED LIST DOCD IN RCRD: CPT | Mod: CPTII,S$GLB,TXP, | Performed by: NURSE PRACTITIONER

## 2022-11-17 PROCEDURE — 76770 US EXAM ABDO BACK WALL COMP: CPT | Mod: TC,TXP

## 2022-11-17 PROCEDURE — 3044F PR MOST RECENT HEMOGLOBIN A1C LEVEL <7.0%: ICD-10-PCS | Mod: CPTII,S$GLB,TXP, | Performed by: NURSE PRACTITIONER

## 2022-11-17 PROCEDURE — 76700 US EXAM ABDOM COMPLETE: CPT | Mod: 26,TXP,, | Performed by: RADIOLOGY

## 2022-11-17 PROCEDURE — 3066F NEPHROPATHY DOC TX: CPT | Mod: CPTII,S$GLB,TXP, | Performed by: NURSE PRACTITIONER

## 2022-11-17 PROCEDURE — 97802 MEDICAL NUTRITION INDIV IN: CPT | Mod: S$GLB,TXP,,

## 2022-11-17 PROCEDURE — 76770 US RETROPERITONEAL COMPLETE: ICD-10-PCS | Mod: 26,TXP,, | Performed by: RADIOLOGY

## 2022-11-17 PROCEDURE — 99203 OFFICE O/P NEW LOW 30 MIN: CPT | Mod: S$GLB,TXP,, | Performed by: TRANSPLANT SURGERY

## 2022-11-17 PROCEDURE — 99999 PR PBB SHADOW E&M-EST. PATIENT-LVL V: ICD-10-PCS | Mod: PBBFAC,TXP,, | Performed by: NURSE PRACTITIONER

## 2022-11-17 PROCEDURE — 3044F HG A1C LEVEL LT 7.0%: CPT | Mod: CPTII,S$GLB,TXP, | Performed by: NURSE PRACTITIONER

## 2022-11-17 PROCEDURE — 76700 US EXAM ABDOM COMPLETE: CPT | Mod: TC,TXP

## 2022-11-17 PROCEDURE — 99203 PR OFFICE/OUTPT VISIT, NEW, LEVL III, 30-44 MIN: ICD-10-PCS | Mod: S$GLB,TXP,, | Performed by: TRANSPLANT SURGERY

## 2022-11-17 PROCEDURE — 76770 US EXAM ABDO BACK WALL COMP: CPT | Mod: 26,TXP,, | Performed by: RADIOLOGY

## 2022-11-17 PROCEDURE — 97802 PR MED NUTR THER, 1ST, INDIV, EA 15 MIN: ICD-10-PCS | Mod: S$GLB,TXP,,

## 2022-11-17 PROCEDURE — 3078F PR MOST RECENT DIASTOLIC BLOOD PRESSURE < 80 MM HG: ICD-10-PCS | Mod: CPTII,S$GLB,TXP, | Performed by: NURSE PRACTITIONER

## 2022-11-17 NOTE — PROGRESS NOTES
PHARM.D. PRE-TRANSPLANT NOTE:    This patient's medication therapy was evaluated as part of her pre-transplant evaluation.      The following general pharmacologic concerns were noted: Lexapro, Trazodone and Synthroid should be resumed to prevent withdrawal    The following concerns for post-operative pain management were noted: N/A    The following pharmacologic concerns related to HCV therapy were noted: N/A      This patient's medication profile was reviewed for considerations for DAA Hepatitis C therapy:    [x]  No current inducers of CYP 3A4 or PGP  [x]  No amiodarone on this patient's EMR profile in the last 24 months  [x]  No past or current atrial fibrillation on this patient's EMR profile       Current Outpatient Medications   Medication Sig Dispense Refill    amLODIPine (NORVASC) 10 MG tablet Take 1 tablet (10 mg total) by mouth once daily. (Patient taking differently: Take 10 mg by mouth nightly.) 90 tablet 3    carvediloL (COREG) 25 MG tablet Take 1 tablet (25 mg total) by mouth 2 (two) times daily with meals. 180 tablet 3    cholecalciferol, vitamin D3, 125 mcg (5,000 unit) Tab Take 1 tablet (5,000 Units total) by mouth once daily. 90 tablet 3    EScitalopram oxalate (LEXAPRO) 10 MG tablet Take 1 tablet (10 mg total) by mouth once daily. 90 tablet 3    furosemide (LASIX) 40 MG tablet Take 1 tablet (40 mg total) by mouth once daily. 90 tablet 3    levocetirizine (XYZAL) 5 MG tablet Take 1 tablet (5 mg total) by mouth daily as needed for Allergies. 90 tablet 3    ondansetron (ZOFRAN-ODT) 4 MG TbDL Take 1 tablet (4 mg total) by mouth every 8 (eight) hours as needed (nausea). 90 tablet 2    sodium bicarbonate 325 MG tablet Take 2 tablets (650 mg total) by mouth 2 (two) times daily. 120 tablet 6    SYNTHROID 200 mcg tablet Take 1 tablet (200 mcg total) by mouth before breakfast. 90 tablet 3    traZODone (DESYREL) 50 MG tablet Take 1 to 2 tablets ( mg total) by mouth nightly as needed for Insomnia. 120  tablet 3    HYDROcodone-acetaminophen (NORCO) 5-325 mg per tablet Take 1 tablet by mouth every 6 (six) hours as needed for Pain. (Patient not taking: Reported on 11/17/2022) 15 tablet 0     No current facility-administered medications for this visit.           I am available for consultation and can be contacted, as needed by the other members of the Transplant team.

## 2022-11-17 NOTE — LETTER
November 21, 2022        Cuba Kimball  [address]  [facility] [address]  Phone: [phone]  Fax: [phone]             [facility]  [address]  [facility] [address]  Phone: [phone]   Patient: Kathie Quezada   MR Number: [id number]   YOB: 1960   Date of Visit: 11/17/2022       Dear Dr. Cuba Kimball    Thank you for referring Kathie Quezada to me for evaluation. Attached you will find relevant portions of my assessment and plan of care.    If you have questions, please do not hesitate to call me. I look forward to following Kathie Quezada along with you.    Sincerely,    Maria Del Carmen Holland, NP    Enclosure    If you would like to receive this communication electronically, please contact [email or web address] or [phone] to request [employer] access.    [employer] is a tool which provides read-only access to select patient information with whom you have a relationship. Its easy to use and provides real time access to review your patients record including encounter summaries, notes, results, and demographic information.    If you feel you have received this communication in error or would no longer like to receive these types of communications, please e-mail [email or web address]

## 2022-11-17 NOTE — PROGRESS NOTES
Transplant Nephrology  Kidney Transplant Recipient Evaluation    Referring Physician: Cuba Kimball  Current Nephrologist: Cuba Kimball    Subjective:   CC:  Initial evaluation of kidney transplant candidacy.    HPI:  Ms. Quezada is a 62 y.o. year old Black or  female who has presented to be evaluated as a potential kidney transplant recipient.  She has advanced kidney disease secondary to presumed diabetic nephropathy and HTN.  Patient is currently pre-dialysis. She has a  none  for dialysis access.     Body mass index is 39.37 kg/m².    Declining renal function in the last 2-3 years. She has been having HTN for 44 years and DM since 2005. She had a CVA 2017 without residual affects. 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 She also was cure with Hep C treated using Harvoni in 2016 and hasn't seen Hepatology since 2018. She reports recurrent diarrhea every time she eats for the last month.   Denies heart disease, pulmonary disease, liver disease, DVt/PE, chronic wounds/infections/amputations.  Functional Status: She is not very active. She can walk leisurely unassisted but reports she is unable to walk briskly on a incline due to SOB and extra fluid present since she is predialysis.     Previous Transplant: no  Previous Blood Transfusion: no  Previous Pregnancy: 7, developed HTN  Previous neurogenic bladder/ urine incontinence: still urinating well.   Anticoagulation/ antiplatelet therapy and reason: no  Potential Donor: yes  High KDPI candidate: no due to weight  Meets center eligibility for accepting HCV+ donor offer: yes      Current Outpatient Medications:     amLODIPine (NORVASC) 10 MG tablet, Take 1 tablet (10 mg total) by mouth once daily. (Patient taking differently: Take 10 mg by mouth nightly.), Disp: 90 tablet, Rfl: 3    carvediloL (COREG) 25 MG tablet, Take 1 tablet (25 mg total) by mouth 2 (two) times daily with  meals., Disp: 180 tablet, Rfl: 3    cholecalciferol, vitamin D3, 125 mcg (5,000 unit) Tab, Take 1 tablet (5,000 Units total) by mouth once daily., Disp: 90 tablet, Rfl: 3    EScitalopram oxalate (LEXAPRO) 10 MG tablet, Take 1 tablet (10 mg total) by mouth once daily., Disp: 90 tablet, Rfl: 3    furosemide (LASIX) 40 MG tablet, Take 1 tablet (40 mg total) by mouth once daily., Disp: 90 tablet, Rfl: 3    levocetirizine (XYZAL) 5 MG tablet, Take 1 tablet (5 mg total) by mouth daily as needed for Allergies., Disp: 90 tablet, Rfl: 3    ondansetron (ZOFRAN-ODT) 4 MG TbDL, Take 1 tablet (4 mg total) by mouth every 8 (eight) hours as needed (nausea)., Disp: 90 tablet, Rfl: 2    sodium bicarbonate 325 MG tablet, Take 2 tablets (650 mg total) by mouth 2 (two) times daily., Disp: 120 tablet, Rfl: 6    SYNTHROID 200 mcg tablet, Take 1 tablet (200 mcg total) by mouth before breakfast., Disp: 90 tablet, Rfl: 3    traZODone (DESYREL) 50 MG tablet, Take 1 to 2 tablets ( mg total) by mouth nightly as needed for Insomnia., Disp: 120 tablet, Rfl: 3    HYDROcodone-acetaminophen (NORCO) 5-325 mg per tablet, Take 1 tablet by mouth every 6 (six) hours as needed for Pain. (Patient not taking: Reported on 11/17/2022), Disp: 15 tablet, Rfl: 0      Past Medical and Surgical History: Ms. Quezada  has a past medical history of Acute respiratory failure with hypoxia and hypercapnia, ATN (acute tubular necrosis), Chronic diastolic heart failure, CKD (chronic kidney disease) stage 4, GFR 15-29 ml/min, CKD stage G3a/A3, GFR 45-59 and albumin creatinine ratio >300 mg/g, CKD stage G3b/A3, GFR 30-44 and albumin creatinine ratio >300 mg/g, Closed compression fracture of L2 lumbar vertebra, Closed compression fracture of L2 lumbar vertebra, Controlled type 2 diabetes with retinopathy, COVID-19 virus infection 7-1-2021, CVA (cerebral vascular accident), Diabetic polyneuropathy associated with type 2 diabetes mellitus, Essential hypertension, Factor  XI deficiency, Gastroesophageal reflux disease without esophagitis, Generalized anxiety disorder, GERD (gastroesophageal reflux disease), GIB (gastrointestinal bleeding), History of hepatitis C, Hypertensive retinopathy, bilateral, Mild nonproliferative diabetic retinopathy, Mixed hyperlipidemia, Myalgia due to statin, Normocytic anemia, NS (nuclear sclerosis), bilateral, PNA (pneumonia), Postoperative hypothyroidism, Renovascular hypertension, S/P kyphoplasty, Slow transit constipation, Type 2 diabetes mellitus with circulatory disorder, with long-term current use of insulin, Type 2 diabetes mellitus with stage 3 chronic kidney disease, without long-term current use of insulin, Type 2 diabetes mellitus with stage 3a chronic kidney disease, without long-term current use of insulin, and Type 2 diabetes mellitus with stage 3b chronic kidney disease, without long-term current use of insulin.  She has a past surgical history that includes Hysterectomy; Back surgery (2017); Tonsillectomy; Colonoscopy (11/13/2015); Oophorectomy (1996); Thyroidectomy (2016); Cholecystectomy (2015); Fixation Kyphoplasty (Bilateral, 08/14/2019); and AV fistula placement (Left, 10/27/2022).    Past Social and Family History: Ms. Quezada reports that she has never smoked. She has never used smokeless tobacco. She reports that she does not currently use alcohol. She reports that she does not use drugs. Her family history includes Aneurysm in her brother; Coronary artery disease in her mother; Diabetes type II in her mother; Heart disease in her mother; Hypertension in her father and mother; Prostate cancer in her father; Stroke in her mother.    Review of Systems   Constitutional:  Negative for appetite change, chills, fatigue and fever.   HENT:  Negative for trouble swallowing.    Respiratory:  Negative for cough, chest tightness, shortness of breath and wheezing.    Cardiovascular:  Negative for chest pain, palpitations and leg swelling.  "  Gastrointestinal:  Negative for abdominal pain, constipation, diarrhea and nausea.   Genitourinary:  Negative for difficulty urinating, frequency and urgency.   Musculoskeletal:  Positive for arthralgias and back pain. Negative for myalgias.   Skin:  Negative for rash.   Neurological:  Negative for dizziness, weakness, light-headedness and headaches.   Hematological:  Bruises/bleeds easily.   Psychiatric/Behavioral:  Positive for dysphoric mood. Negative for sleep disturbance.      Objective:   Blood pressure (!) 174/77, pulse 73, temperature 97.3 °F (36.3 °C), temperature source Temporal, resp. rate 18, height 5' 7.64" (1.718 m), weight 116.2 kg (256 lb 2.8 oz), SpO2 97 %.body mass index is 39.37 kg/m².    Physical Exam  Constitutional:       General: She is not in acute distress.     Appearance: She is well-developed. She is obese. She is not diaphoretic.   Cardiovascular:      Rate and Rhythm: Normal rate and regular rhythm.      Heart sounds: Normal heart sounds. No murmur heard.    No friction rub. No gallop.   Pulmonary:      Effort: Pulmonary effort is normal. No respiratory distress.      Breath sounds: Normal breath sounds. No wheezing or rales.   Abdominal:      General: Bowel sounds are normal. There is no distension.      Palpations: Abdomen is soft.      Tenderness: There is no abdominal tenderness.   Musculoskeletal:         General: Swelling (3+ edema bilaterally) present. No tenderness. Normal range of motion.   Skin:     General: Skin is warm and dry.      Findings: No rash.      Nails: There is no clubbing.   Neurological:      Mental Status: She is alert and oriented to person, place, and time.   Psychiatric:         Behavior: Behavior normal.       Labs:  Lab Results   Component Value Date    WBC 11.52 11/17/2022    HGB 10.0 (L) 11/17/2022    HCT 32.0 (L) 11/17/2022     11/17/2022    K 3.6 11/17/2022     11/17/2022    CO2 21 (L) 11/17/2022    BUN 34 (H) 11/17/2022    CREATININE 4.2 " (H) 11/17/2022    EGFRNONAA 13.0 (A) 07/30/2022    CALCIUM 8.7 11/17/2022    PHOS 4.6 (H) 11/17/2022    MG 1.9 09/13/2022    ALBUMIN 4.0 11/17/2022    AST 11 11/17/2022    ALT 9 (L) 11/17/2022    UTPCR 1.37 (H) 05/27/2019    .9 (H) 11/17/2022       Lab Results   Component Value Date    BILIRUBINUA Negative 07/30/2022    LIPASE 46 07/30/2022    PROTEINUA 2+ (A) 07/30/2022    NITRITE Negative 07/30/2022    RBCUA 1 07/30/2022    WBCUA 1 07/30/2022       No results found for: HLAABCTYPE    Labs were reviewed with the patient.    Assessment:     1. Pre-transplant evaluation for kidney transplant    2. Chronic kidney disease (CKD), stage V    3. Renovascular hypertension    4. Type 2 diabetes mellitus with stage 5 chronic kidney disease not on chronic dialysis, without long-term current use of insulin    5. History of hepatitis C        Plan:   Ms. Quezada is a 62 y.o. year old Black or  female who has presented to be evaluated as a potential kidney transplant recipient. Patient is currently pre-dialysis. PMHx of HTN, DM, CVA, Factor Xi deficiency, Cured HCV    Body mass index is 39.37 kg/m².  Prior to Listing, will need the following items to be completed:  1. Standard serologies, cardiac and imaging studies   2. Cardiology clearance, DM  3. Hepatology follow-up, Hx of cured HCV  4. Have surgeon review CT form 7/30/22  5. Hematology for Hx factor XI deficiency and needs recs for surgery   6. GI evaluation for recurrent diarrhea  7. Factor XI-- obtain coag studies   Body mass index is 39.37 kg/m².  Encourage lifestyle modifications: diet, exercise, weight loss   Needs to maintain acceptable BMI for txp/guidelines       Transplant Candidacy:   Based on available information, Ms. Quezada is a high-risk kidney transplant candidate.   Meets center eligibility for accepting HCV+ donor offer - yes.  Patient educated on HCV+ donors. Kathie is willing to accept HCV+ donor offer - yes   Patient is a  candidate for KDPI > 85 kidney donor offer - no. Due to weight  Final determination of transplant candidacy will be made once workup is complete and reviewed by the selection committee.    Patient advised that it is recommended that all transplant candidates, and their close contacts and household members receive Covid vaccination.    Maria Del Carmen Holland NP       UNOS Patient Status  Functional Status: 80% - Normal activity with effort: some symptoms of disease  Physical Capacity: Limited Mobility

## 2022-11-17 NOTE — PROGRESS NOTES
Transplant Recipient Adult Psychosocial Assessment    Pt is pre-dialysis.     Kathie Quezada  [address]  [facility] [address]  Telephone Information:   Mobile [phone]   Home  [phone] (home)  Work  There is no work phone number on file.  E-mail  [email or web address]    Sex: female  YOB: 1960  Age: 62 y.o.    Encounter Date: 2022  [place] Citizen: yes  Primary Language: English   Needed: no    Emergency Contact:  Name: Miles Quezada  Relationship: son  Address: [address] [place] [address]  Phone Numbers:  [phone] (mobile)    Family/Social Support:   Number of dependents/: Pt denies  Marital history: Pt reports 1 previous marriage that ended in death, pt is .   Other family dynamics: Pt reports both parents . Pt reports 2 adult sisters- 1 lives in [facility] and 1 lives in [place], [place]. Pt has 5 adult children that all live in [place] [facility] [place] and will rotate caregiver duties posttransplant. Pt reports many grandchildren and extremely supportive, close family in the [place] area.     Household Composition:  Pt lives alone; however, pt reports pt's oldest son is a truckdriver and stays at pt's home often.     Do you and your caregivers have access to reliable transportation? yes  PRIMARY CAREGIVER: Mike Quezada, pt's daughter, will be primary caregiver, phone number [phone]     Able to take time off work without financial concerns: yes. Pt reports all 5 adult children will rotate caregiver duties and able to take time off work as needed. Pt reports oldest son is owner of  company and will likely take off 1 month of work to care for pt.     Additional Significant Others who will Assist with Transplant:  Name: Cristhian Chelsie; RTA   Age: 38  City: [facility] State: [place]  Relationship: daughter  Does person drive? 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  "State: [place]  Relationship: daughter  Does person drive? yes  Phone: [phone]    Name: Lacey Osorio; manager at [employer]  Age: 39  City: [facility] State: [place]  Relationship: daughter  Does person drive? yes  Phone: [phone]    Name: Mike Osorio; works at [employer]  Age: 46  City: [facility] State: [place]  Relationship: daughter  Does person drive? yes  Phone: [phone]      Living Will: no .  Healthcare Power of : no  Advance Directives on file: <<no information> per medical record.  Verbally reviewed LW/HCPA information.   provided patient with copy of LW/HCPA documents and provided education on completion of forms    Living Donors: Education and resource information given to patient. Pt reports some of her children are interested in being living donors.     Highest Education Level: High School (9-12) or GED  Reading Ability: 12th grade  Reports difficulty with: seeing- pt wears glasses. Pt reports minor difficulties with memory (walking into a room and not remembering why). Pt reports no issue remembering medications or appointments. Pt's daughter reports pt "remembers everything and is trying to do too much".    Learns Best By:  Hands-on     Status: no  VA Benefits: no     Working for Income: No  If no, reason not working: Disability  Spouse/Significant Other Employment: Pt's  . Pt reports not in a relationship currently.     Disabled: yes: date disability began: , due to: CHF.    Monthly Income:  $1,085- disability    Insurance:   Payer/Plan Subscr  Sex Relation Sub. Ins. ID Effective Group Num   1. [employer] MANAGE* OSORIO,KIRSTIE* 1960 Female Self [id number]                                   [address] [address]   2. [employer] - AKHIL* OSORIO,KIRSTIE* 1960 Female Self [id number]                                   [address] [address]     Primary Insurance (for UNOS reporting): Public Insurance - Medicare FFS (Fee For Service)  Secondary " "Insurance (for UNOS reporting): Public Insurance - Medicaid    Dialysis Adherence: Patient reports pre-dialysis and anticipates starting dialysis in next few months, access already placed.      Infusion Service: patient utilizing? no  Home Health: patient utilizing? no  DME: yes, [facility]  Pulmonary/Cardiac Rehab: Pt reports cardiac rehab "long time ago" and sees cardiologist once/year   ADLS:  Pt reports independent with all ADLs and able to drive (doesn't own vehicle).     Adherence:    Pt reports is highly compliant with all medical appointments and instructions.  Adherence education and counseling provided.     Per History Section:  Past Medical History:   Diagnosis Date    Acute respiratory failure with hypoxia and hypercapnia 5/26/2019    ATN (acute tubular necrosis) 2/8/2019    Chronic diastolic heart failure 2/7/2019 2-8-19 Mild left atrial enlargement. Mild left ventricular enlargement. Normal left ventricular systolic function. The estimated ejection fraction is 55% Indeterminate left ventricular diastolic function. Normal right ventricular systolic function. Mild mitral regurgitation. Mild to moderate tricuspid regurgitation. The estimated PA systolic pressure is 32 mm Hg Normal central venous pressure (3 m    CKD (chronic kidney disease) stage 4, GFR 15-29 ml/min 5/16/2018    CKD stage G3a/A3, GFR 45-59 and albumin creatinine ratio >300 mg/g 5/16/2018    CKD stage G3b/A3, GFR 30-44 and albumin creatinine ratio >300 mg/g 5/16/2018    Closed compression fracture of L2 lumbar vertebra 5/24/2019    Closed compression fracture of L2 lumbar vertebra 5/24/2019     IMO Regulatory Load October 2019    Controlled type 2 diabetes with retinopathy 4/9/2018    COVID-19 virus infection 7-1-2021 7/1/2021    CVA (cerebral vascular accident) 2017    Diabetic polyneuropathy associated with type 2 diabetes mellitus 12/4/2020    Essential hypertension 4/9/2018    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  " 7-31-19 Factor XI Activity 55 - 145 % 96       Gastroesophageal reflux disease without esophagitis 2019    Generalized anxiety disorder 2019    GERD (gastroesophageal reflux disease)     GIB (gastrointestinal bleeding) 10/18/2018    History of hepatitis C     s/p succesful Rx w/ Harvoni  - [id number] (cure)     Hypertensive retinopathy, bilateral 12/10/2019    Mild nonproliferative diabetic retinopathy 2018    Mixed hyperlipidemia 2018    Myalgia due to statin 2022    Normocytic anemia 2022    NS (nuclear sclerosis), bilateral 12/10/2019    PNA (pneumonia) 2019    Postoperative hypothyroidism     Renovascular hypertension 2018    S/P kyphoplasty 2019: L2 kyphoplasty with Dr. Kwon     Slow transit constipation 2019    Type 2 diabetes mellitus with circulatory disorder, with long-term current use of insulin 2018    Type 2 diabetes mellitus with stage 3 chronic kidney disease, without long-term current use of insulin 2018    Type 2 diabetes mellitus with stage 3a chronic kidney disease, without long-term current use of insulin 3/3/2021    Type 2 diabetes mellitus with stage 3b chronic kidney disease, without long-term current use of insulin 2022     Social History     Tobacco Use    Smoking status: Never    Smokeless tobacco: Never   Substance Use Topics    Alcohol use: Not Currently     Social History     Substance and Sexual Activity   Drug Use No     Social History     Substance and Sexual Activity   Sexual Activity Not Currently       Per Today's Psychosocial:  Tobacco: none, patient denies any use.  Alcohol: none, patient denies any use.  Illicit Drugs/Non-prescribed Medications: none, patient denies any use.      Arrests/DWI/Treatment/Rehab: patient denies    Psychiatric History:    Mental Health: Pt reports period of depression in 2018 after pt's sister . Pt reports holding sister's hand as she passed away and having complicated grief  afterwards. Pt reports no current mental health concerns.   Psychiatrist/Counselor: Pt reports seeing a therapist once around 2000 as part of disability evaluation. Pt reports open to meeting with Psychiatry if recommended by transplant team.   Medications:  Pt reports currently taking Lexapro, prescribed by PCP. Pt states starting Lexapro after losing sister. Pt states depression well managed. Pt reports taking trazodone since 2020 to sleep, as needed, also prescribed by PCP.   Suicide/Homicide Issues: Pt denies current or history of SI/HI.      Knowledge: Patient states having clear understanding and realistic expectations regarding the potential risks and potential benefits of organ transplantation and organ donation and agrees to discuss with health care team members and support system members, as well as to utilize available resources and express questions and/or concerns in order to further facilitate the pt informed decision-making.  Resources and information provided and reviewed.     Patient is aware of [facility]'s affiliation and/or partnership with agencies in home health care, LTAC, SNF, [facility], and other hospitals and clinics.    Understanding: Patient reports having a clear understanding of the many lifetime commitments involved with being a transplant recipient, including costs, compliance, medications, lab work, procedures, appointments, concrete and financial planning, preparedness, timely and appropriate communication of concerns, abstinence (ETOH, tobacco, illicit non-prescribed drugs), adherence to all health care team recommendations, support system and caregiver involvement, appropriate and timely resource utilization and follow-through, mental health counseling as needed/recommended, and patient and caregiver responsibilities.  Social Service Handbook, resources and detailed educational information provided and reviewed.  Educational information provided.    Patient also reports current and  "expected compliance with health care regime, and patient states having a clear understanding of the importance of compliance.  Patient reports a clear understanding that risks and benefits may be involved with organ transplantation and with organ donation.  Patient also reports clear understanding that psychosocial risk factors may affect patient, and include but are not limited to feelings of depression, generalized anxiety, anxiety regarding dependence on others, post traumatic stress disorder, feelings of guilt and other emotional and/or mental concerns, and/or exacerbation of existing mental health concerns.  Detailed resources provided and discussed.  Patient agrees to access appropriate resources in a timely manner as needed and/or as recommended, and to communicate concerns appropriately.  Patient also reports a clear understanding of treatment options available.      Patient and caregiver received education in a group setting.   reviewed education, provided additional information, and answered questions.    Feelings or Concerns: Pt reports some concerns with transplant "not working". SW explained to pt transplant is NOT a cure for ESRD and only one treatment option.     Coping: Identify Patient & Caregiver Strategies to [place]:   1. Currently & Pre-transplant - Watch tv, cooking, crafting, grandkids/family, selina, L[employer]   2. At the time of surgery - selina, family   3. During post-Transplant & Recovery Period - Watch tv, crafting, family/grandkids, selina, L[employer]-day    Goals: Pt reports goal of having more energy and being less tired.  Patient referred to Vocational Rehabilitation.    Interview Behavior: Patient and caregiver present as alert and oriented x 4, pleasant, good eye contact, well groomed, recall good, concentration/judgement good, average intelligence, calm, communicative, cooperative, and asking and answering questions appropriately.  Pt's highly supportive daughter, Matthew, present for " entirety of evaluation with pt's permission.          Transplant Social Work - Candidacy  Assessment/Plan:     Psychosocial Suitability: Based on psychosocial risk factors, patient presents as Low risk for kidney transplant due to established caregiver plan, supportive family system, no reported history of substance use, adequate insurance coverage and personal finances to cover transplant costs, and realistic beliefs and expectations regarding risks and benefits of transplant.     Recommendations/Additional Comments: Sw recommends pt conduct fundraising to assist pt with pay for cost of medications, food, gas, and other transplant related needs. SW recommends pt remain aware of potential mental health concerns and contact team if any concerns arise. SW recommends pt remain abstinent from tobacco, ETOH, and substance use. SW supports pt's continued adherence. SW remains available to answer any questions or concerns that arise as pt moves through transplant process.     Giselle Leger LMSW

## 2022-11-17 NOTE — PROGRESS NOTES
INITIAL PATIENT EDUCATION NOTE    Ms. Kathie Quezada was seen in pre-kidney transplant clinic for evaluation for kidney, kidney/pancreas or pancreas only transplant.  The patient attended a an individual video education session that discussed/reviewed the following aspects of transplantation: evaluation including diagnostic and laboratory testing,( Chemistries, Hematology, Serologies including HIV and Hepatitis and HLA) required for transplantation and selection committee process, UNOS waitlist management/multiple listings, types of organs offered (KDPI < 85%, KDPI > 85%, PHS risk, DCD, HCV+, HIV+ for HIV+ recipients and enbloc/dual), financial aspects, surgical procedures, dietary instruction pre- and post-transplant, health maintenance pre- and post-transplant, post-transplant hospitalization and outpatient follow-up, potential to participate in a research protocol, and medication management and side effects.  A question and answer session was provided after the presentation.    The patient was seen by all members of the multi-disciplinary team to include: Nephrologist/GORDON, Surgeon, , Transplant Coordinator, , Pharmacist and Dietician (if applicable).    The patient reviewed and signed all consents for evaluation which were witnessed and sent to scanning into the [facility] chart.    The patient was given an education book and plan for further evaluation based on her individual assessment.      Reviewed program requirement for complete COVID vaccination with documentation prior to listing.  COVID education information reviewed with patient. Patient encouraged to be up to date on all vaccinations.       The patient was informed that the transplant team would manage immediate post op pain. If the patient requires long term pain management, they will need to have that pain management addressed by their PCP or previous provider who wrote for long term pain medicines.    The patient  was encouraged to call with any questions or concerns.

## 2022-11-18 NOTE — PROGRESS NOTES
TRANSPLANT NUTRITIONAL ASSESSMENT    Referring Provider: Cuba Kimball MD     Reason for Visit: Pre-kidney transplant work-up (pre-dialysis)    Age: 62 y.o.  Sex: female    Patient Active Problem List   Diagnosis    Postoperative hypothyroidism    Renovascular hypertension    Mixed hyperlipidemia    Nephrotic range proteinuria    CKD (chronic kidney disease) stage 4, GFR 15-29 ml/min    History of hepatitis C    Chronic diastolic heart failure    Factor XI deficiency    Vitamin D deficiency    S/P kyphoplasty    Generalized anxiety disorder    Class 2 severe obesity due to excess calories with serious comorbidity and body mass index (BMI) of 35.0 to 35.9 in adult    Diabetic polyneuropathy associated with type 2 diabetes mellitus    Controlled type 2 diabetes mellitus with stable proliferative retinopathy of both eyes, unspecified whether long term insulin use    Normocytic anemia    Type 2 diabetes mellitus with stage 5 chronic kidney disease not on chronic dialysis, without long-term current use of insulin    Myalgia due to statin    Chronic kidney disease (CKD), stage V    Osteopenia of multiple sites    Pre-transplant evaluation for kidney transplant     Past Medical History:   Diagnosis Date    Acute respiratory failure with hypoxia and hypercapnia 5/26/2019    ATN (acute tubular necrosis) 2/8/2019    Chronic diastolic heart failure 2/7/2019 2-8-19 Mild left atrial enlargement. Mild left ventricular enlargement. Normal left ventricular systolic function. The estimated ejection fraction is 55% Indeterminate left ventricular diastolic function. Normal right ventricular systolic function. Mild mitral regurgitation. Mild to moderate tricuspid regurgitation. The estimated PA systolic pressure is 32 mm Hg Normal central venous pressure (3 m    CKD (chronic kidney disease) stage 4, GFR 15-29 ml/min 5/16/2018    CKD stage G3a/A3, GFR 45-59 and albumin creatinine ratio >300 mg/g 5/16/2018    CKD stage G3b/A3,  GFR 30-44 and albumin creatinine ratio >300 mg/g 5/16/2018    Closed compression fracture of L2 lumbar vertebra 5/24/2019    Closed compression fracture of L2 lumbar vertebra 5/24/2019     IMO Regulatory Load October 2019    Controlled type 2 diabetes with retinopathy 4/9/2018    COVID-19 virus infection 7-1-2021 7/1/2021    CVA (cerebral vascular accident) 2017    Diabetic polyneuropathy associated with type 2 diabetes mellitus 12/4/2020    Essential hypertension 4/9/2018    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  7-31-19 Factor XI Activity 55 - 145 % 96       Gastroesophageal reflux disease without esophagitis 5/24/2019    Generalized anxiety disorder 11/8/2019    GERD (gastroesophageal reflux disease)     GIB (gastrointestinal bleeding) 10/18/2018    History of hepatitis C     s/p succesful Rx w/ Harvoni  - SVR12 (cure) 2016    Hypertensive retinopathy, bilateral 12/10/2019    Mild nonproliferative diabetic retinopathy 05/02/2018    Mixed hyperlipidemia 4/9/2018    Myalgia due to statin 9/7/2022    Normocytic anemia 1/14/2022    NS (nuclear sclerosis), bilateral 12/10/2019    PNA (pneumonia) 5/28/2019    Postoperative hypothyroidism     Renovascular hypertension 4/9/2018    S/P kyphoplasty 8/14/2019 8/14/19: L2 kyphoplasty with Dr. Kwon     Slow transit constipation 5/27/2019    Type 2 diabetes mellitus with circulatory disorder, with long-term current use of insulin 4/9/2018    Type 2 diabetes mellitus with stage 3 chronic kidney disease, without long-term current use of insulin 4/9/2018    Type 2 diabetes mellitus with stage 3a chronic kidney disease, without long-term current use of insulin 3/3/2021    Type 2 diabetes mellitus with stage 3b chronic kidney disease, without long-term current use of insulin 9/7/2022     Lab Results   Component Value Date     (H) 11/17/2022    K 3.6 11/17/2022    PHOS 4.6 (H) 11/17/2022    MG 1.9 09/13/2022    CHOL 179 11/17/2022    HDL 53 11/17/2022  "   TRIG 102 11/17/2022    ALBUMIN 4.0 11/17/2022    HGBA1C 5.5 11/17/2022    CALCIUM 8.7 11/17/2022     Other Pertinent Labs: N/A    Current Outpatient Medications   Medication Sig    amLODIPine (NORVASC) 10 MG tablet Take 1 tablet (10 mg total) by mouth once daily. (Patient taking differently: Take 10 mg by mouth nightly.)    carvediloL (COREG) 25 MG tablet Take 1 tablet (25 mg total) by mouth 2 (two) times daily with meals.    cholecalciferol, vitamin D3, 125 mcg (5,000 unit) Tab Take 1 tablet (5,000 Units total) by mouth once daily.    EScitalopram oxalate (LEXAPRO) 10 MG tablet Take 1 tablet (10 mg total) by mouth once daily.    furosemide (LASIX) 40 MG tablet Take 1 tablet (40 mg total) by mouth once daily.    levocetirizine (XYZAL) 5 MG tablet Take 1 tablet (5 mg total) by mouth daily as needed for Allergies.    ondansetron (ZOFRAN-ODT) 4 MG TbDL Take 1 tablet (4 mg total) by mouth every 8 (eight) hours as needed (nausea).    sodium bicarbonate 325 MG tablet Take 2 tablets (650 mg total) by mouth 2 (two) times daily.    SYNTHROID 200 mcg tablet Take 1 tablet (200 mcg total) by mouth before breakfast.    traZODone (DESYREL) 50 MG tablet Take 1 to 2 tablets ( mg total) by mouth nightly as needed for Insomnia.    HYDROcodone-acetaminophen (NORCO) 5-325 mg per tablet Take 1 tablet by mouth every 6 (six) hours as needed for Pain. (Patient not taking: Reported on 11/17/2022)     No current facility-administered medications for this visit.     Allergies: Atorvastatin, Mushroom, Bactrim [sulfamethoxazole-trimethoprim], Codeine, Gabapentin, Iodine, Peanut butter flavor, Penicillins, Shellfish containing products, and Sulfa (sulfonamide antibiotics)    Ht Readings from Last 1 Encounters:   11/17/22 5' 7.64" (1.718 m)     Wt Readings from Last 1 Encounters:   11/17/22 116.2 kg (256 lb 2.8 oz)      BMI: Body mass index is 39.37 kg/m².    Usual Weight: 230 lb   Weight Change/Time: up and down 2/2 fluid, noted on " lasix   Current Diet: regular   Appetite/Current Intake: fair , decreased compared to normal x 1 month   Exercise/Physical Activity: functional in ADLs   Nutritional/Herbal Supplements: none  Chewing/Swallowing Problems: none  Symptoms: nausea daily, diarrhea maybe 1x/month     Estimated Kcal Need: 2324 kcal/day (20 kcal/kg)  Estimated Protein Need: 93 gm/day (0.8 gm/kg)     Nutritional History:   Pt and caregiver seen yesterday, 11/17/22. Pt reports dinner is normally a small meal. Pt is lactose intolerant and does not consume whole grains. Pt enjoys chicken, pork, beef, fish, broccoli, green beans, peas, and asparagus.     Diet Recall    Morning: banana or [employer] sausage biscuit     Midday: leftovers or pre made salad from [employer] ( salad, adds canned asparagus and olives, ranch dressing)    Evening: turkey or roast beef sandwich (matthew, white bread)    Snacks: large variety of fruits (favorites include banana, meghan, orange, nectarines)     Desserts: jolly ranchers, peppermint     Beverages: 2 16.9 oz bottles of water/day, 1 coke or diet coke/day         Seasonings: [place]    Restaurants/Fast Food: seldomly        Nutritional Diagnoses  Problem: food- and nutrition-related knowledge deficit  Etiology: lack of previous education on pre-kidney transplant nutrition recommendations  Symptoms: diet recall and questions from pt     Educational Need? yes  Barriers: none identified  Discussed with: patient and caregiver  Interventions: Patient taught nutrition information regarding Low phosphorus diet education and Pre-kidney transplant work-up (pre-dialysis). Renal Nutrition Therapy packet reviewed (high/low food sources of K, Phos and protein, low sodium and fluid intake, emphasis on moderate protein intake). Encouraged physical activity daily, regular exercise as tolerated, stay mobile.  Goals/Recommendations: diet adherence  Actions Taken: instruct/provide written information  Patient and/or family  comprehend instructions: yes  Outcome: Verbalizes understanding  Monitoring: Contact information provided. Will follow-up in clinic and communicate with the care team as needed.      Counseling Time: 20 minutes

## 2022-11-21 ENCOUNTER — OFFICE VISIT (OUTPATIENT)
Dept: NEPHROLOGY | Facility: CLINIC | Age: 62
End: 2022-11-21
Payer: MEDICARE

## 2022-11-21 ENCOUNTER — TELEPHONE (OUTPATIENT)
Dept: TRANSPLANT | Facility: CLINIC | Age: 62
End: 2022-11-21
Payer: MEDICARE

## 2022-11-21 VITALS
HEIGHT: 68 IN | BODY MASS INDEX: 38.65 KG/M2 | DIASTOLIC BLOOD PRESSURE: 80 MMHG | OXYGEN SATURATION: 92 % | WEIGHT: 255.06 LBS | HEART RATE: 71 BPM | SYSTOLIC BLOOD PRESSURE: 122 MMHG

## 2022-11-21 DIAGNOSIS — E11.22 TYPE 2 DIABETES MELLITUS WITH STAGE 5 CHRONIC KIDNEY DISEASE NOT ON CHRONIC DIALYSIS, WITHOUT LONG-TERM CURRENT USE OF INSULIN: ICD-10-CM

## 2022-11-21 DIAGNOSIS — N18.5 CHRONIC KIDNEY DISEASE (CKD), STAGE V: Primary | ICD-10-CM

## 2022-11-21 DIAGNOSIS — D63.1 ANEMIA OF CHRONIC RENAL FAILURE, STAGE 5: ICD-10-CM

## 2022-11-21 DIAGNOSIS — N18.5 TYPE 2 DIABETES MELLITUS WITH STAGE 5 CHRONIC KIDNEY DISEASE NOT ON CHRONIC DIALYSIS, WITHOUT LONG-TERM CURRENT USE OF INSULIN: ICD-10-CM

## 2022-11-21 DIAGNOSIS — N18.5 ANEMIA OF CHRONIC RENAL FAILURE, STAGE 5: ICD-10-CM

## 2022-11-21 PROCEDURE — 99214 OFFICE O/P EST MOD 30 MIN: CPT | Mod: S$GLB,,, | Performed by: INTERNAL MEDICINE

## 2022-11-21 PROCEDURE — 3008F PR BODY MASS INDEX (BMI) DOCUMENTED: ICD-10-PCS | Mod: CPTII,S$GLB,, | Performed by: INTERNAL MEDICINE

## 2022-11-21 PROCEDURE — 3079F DIAST BP 80-89 MM HG: CPT | Mod: CPTII,S$GLB,, | Performed by: INTERNAL MEDICINE

## 2022-11-21 PROCEDURE — 3066F NEPHROPATHY DOC TX: CPT | Mod: CPTII,S$GLB,, | Performed by: INTERNAL MEDICINE

## 2022-11-21 PROCEDURE — 1159F PR MEDICATION LIST DOCUMENTED IN MEDICAL RECORD: ICD-10-PCS | Mod: CPTII,S$GLB,, | Performed by: INTERNAL MEDICINE

## 2022-11-21 PROCEDURE — 3044F PR MOST RECENT HEMOGLOBIN A1C LEVEL <7.0%: ICD-10-PCS | Mod: CPTII,S$GLB,, | Performed by: INTERNAL MEDICINE

## 2022-11-21 PROCEDURE — 99499 UNLISTED E&M SERVICE: CPT | Mod: HCNC,S$GLB,, | Performed by: INTERNAL MEDICINE

## 2022-11-21 PROCEDURE — 3044F HG A1C LEVEL LT 7.0%: CPT | Mod: CPTII,S$GLB,, | Performed by: INTERNAL MEDICINE

## 2022-11-21 PROCEDURE — 1160F PR REVIEW ALL MEDS BY PRESCRIBER/CLIN PHARMACIST DOCUMENTED: ICD-10-PCS | Mod: CPTII,S$GLB,, | Performed by: INTERNAL MEDICINE

## 2022-11-21 PROCEDURE — 3074F SYST BP LT 130 MM HG: CPT | Mod: CPTII,S$GLB,, | Performed by: INTERNAL MEDICINE

## 2022-11-21 PROCEDURE — 99999 PR PBB SHADOW E&M-EST. PATIENT-LVL IV: CPT | Mod: PBBFAC,,, | Performed by: INTERNAL MEDICINE

## 2022-11-21 PROCEDURE — 3008F BODY MASS INDEX DOCD: CPT | Mod: CPTII,S$GLB,, | Performed by: INTERNAL MEDICINE

## 2022-11-21 PROCEDURE — 1160F RVW MEDS BY RX/DR IN RCRD: CPT | Mod: CPTII,S$GLB,, | Performed by: INTERNAL MEDICINE

## 2022-11-21 PROCEDURE — 99499 RISK ADDL DX/OHS AUDIT: ICD-10-PCS | Mod: HCNC,S$GLB,, | Performed by: INTERNAL MEDICINE

## 2022-11-21 PROCEDURE — 99999 PR PBB SHADOW E&M-EST. PATIENT-LVL IV: ICD-10-PCS | Mod: PBBFAC,,, | Performed by: INTERNAL MEDICINE

## 2022-11-21 PROCEDURE — 99214 PR OFFICE/OUTPT VISIT, EST, LEVL IV, 30-39 MIN: ICD-10-PCS | Mod: S$GLB,,, | Performed by: INTERNAL MEDICINE

## 2022-11-21 PROCEDURE — 1159F MED LIST DOCD IN RCRD: CPT | Mod: CPTII,S$GLB,, | Performed by: INTERNAL MEDICINE

## 2022-11-21 PROCEDURE — 3066F PR DOCUMENTATION OF TREATMENT FOR NEPHROPATHY: ICD-10-PCS | Mod: CPTII,S$GLB,, | Performed by: INTERNAL MEDICINE

## 2022-11-21 PROCEDURE — 3074F PR MOST RECENT SYSTOLIC BLOOD PRESSURE < 130 MM HG: ICD-10-PCS | Mod: CPTII,S$GLB,, | Performed by: INTERNAL MEDICINE

## 2022-11-21 PROCEDURE — 3079F PR MOST RECENT DIASTOLIC BLOOD PRESSURE 80-89 MM HG: ICD-10-PCS | Mod: CPTII,S$GLB,, | Performed by: INTERNAL MEDICINE

## 2022-11-21 NOTE — TELEPHONE ENCOUNTER
SW returned pt's call at [phone]. Pt informed SW pt had spoken to insurance company and received estimated co-pays for prescriptions needed post-transplant. Pt reports ability to afford prescriptions. SW informed pt to call and speak with  about prescriptions as part of pt's evaluation, pt agreed.    SW remains available.    Giselle Leger LMSW

## 2022-11-23 ENCOUNTER — OFFICE VISIT (OUTPATIENT)
Dept: VASCULAR SURGERY | Facility: CLINIC | Age: 62
End: 2022-11-23
Payer: MEDICARE

## 2022-11-23 ENCOUNTER — HOSPITAL ENCOUNTER (OUTPATIENT)
Dept: VASCULAR SURGERY | Facility: CLINIC | Age: 62
Discharge: HOME | End: 2022-11-23
Attending: SURGERY
Payer: MEDICARE

## 2022-11-23 ENCOUNTER — DOCUMENTATION ONLY (OUTPATIENT)
Dept: VASCULAR SURGERY | Facility: CLINIC | Age: 62
End: 2022-11-23

## 2022-11-23 VITALS
WEIGHT: 253.5 LBS | DIASTOLIC BLOOD PRESSURE: 79 MMHG | BODY MASS INDEX: 37.55 KG/M2 | TEMPERATURE: 98 F | HEART RATE: 71 BPM | SYSTOLIC BLOOD PRESSURE: 182 MMHG | HEIGHT: 69 IN

## 2022-11-23 DIAGNOSIS — N18.6 ESRD (END STAGE RENAL DISEASE): Primary | ICD-10-CM

## 2022-11-23 DIAGNOSIS — Z98.890 S/P ARTERIOVENOUS (AV) FISTULA CREATION: Primary | ICD-10-CM

## 2022-11-23 DIAGNOSIS — Z98.890 S/P ARTERIOVENOUS (AV) FISTULA CREATION: ICD-10-CM

## 2022-11-23 PROCEDURE — 3077F SYST BP >= 140 MM HG: CPT | Mod: CPTII,NTX,S$GLB, | Performed by: SURGERY

## 2022-11-23 PROCEDURE — 99999 PR PBB SHADOW E&M-EST. PATIENT-LVL III: CPT | Mod: PBBFAC,TXP,, | Performed by: SURGERY

## 2022-11-23 PROCEDURE — 93990 PR DUPLEX HEMODIALYSIS ACCESS: ICD-10-PCS | Mod: S$GLB,TXP,, | Performed by: SURGERY

## 2022-11-23 PROCEDURE — 99024 POSTOP FOLLOW-UP VISIT: CPT | Mod: NTX,S$GLB,, | Performed by: SURGERY

## 2022-11-23 PROCEDURE — 3066F PR DOCUMENTATION OF TREATMENT FOR NEPHROPATHY: ICD-10-PCS | Mod: CPTII,NTX,S$GLB, | Performed by: SURGERY

## 2022-11-23 PROCEDURE — 99499 RISK ADDL DX/OHS AUDIT: ICD-10-PCS | Mod: HCNC,S$GLB,TXP, | Performed by: SURGERY

## 2022-11-23 PROCEDURE — 3078F PR MOST RECENT DIASTOLIC BLOOD PRESSURE < 80 MM HG: ICD-10-PCS | Mod: CPTII,NTX,S$GLB, | Performed by: SURGERY

## 2022-11-23 PROCEDURE — 3008F PR BODY MASS INDEX (BMI) DOCUMENTED: ICD-10-PCS | Mod: CPTII,NTX,S$GLB, | Performed by: SURGERY

## 2022-11-23 PROCEDURE — 3078F DIAST BP <80 MM HG: CPT | Mod: CPTII,NTX,S$GLB, | Performed by: SURGERY

## 2022-11-23 PROCEDURE — 99024 PR POST-OP FOLLOW-UP VISIT: ICD-10-PCS | Mod: NTX,S$GLB,, | Performed by: SURGERY

## 2022-11-23 PROCEDURE — 3008F BODY MASS INDEX DOCD: CPT | Mod: CPTII,NTX,S$GLB, | Performed by: SURGERY

## 2022-11-23 PROCEDURE — 99999 PR PBB SHADOW E&M-EST. PATIENT-LVL III: ICD-10-PCS | Mod: PBBFAC,TXP,, | Performed by: SURGERY

## 2022-11-23 PROCEDURE — 3066F NEPHROPATHY DOC TX: CPT | Mod: CPTII,NTX,S$GLB, | Performed by: SURGERY

## 2022-11-23 PROCEDURE — 93990 DOPPLER FLOW TESTING: CPT | Mod: S$GLB,TXP,, | Performed by: SURGERY

## 2022-11-23 PROCEDURE — 99499 UNLISTED E&M SERVICE: CPT | Mod: HCNC,S$GLB,TXP, | Performed by: SURGERY

## 2022-11-23 PROCEDURE — 3077F PR MOST RECENT SYSTOLIC BLOOD PRESSURE >= 140 MM HG: ICD-10-PCS | Mod: CPTII,NTX,S$GLB, | Performed by: SURGERY

## 2022-11-23 PROCEDURE — 3044F HG A1C LEVEL LT 7.0%: CPT | Mod: CPTII,NTX,S$GLB, | Performed by: SURGERY

## 2022-11-23 PROCEDURE — 3044F PR MOST RECENT HEMOGLOBIN A1C LEVEL <7.0%: ICD-10-PCS | Mod: CPTII,NTX,S$GLB, | Performed by: SURGERY

## 2022-11-23 NOTE — H&P (VIEW-ONLY)
Kathie Quezada  11/23/2022    HPI:  Patient is a 62 y.o. female with a h/o DMII with retinopathy, HTN, CVA, Factor 11 deficiency, and multiple AKIS the last was from urinary retention who is here today for f/u.  She is R handed with an obese upper arm.  Pt is unsure of timeframe for needing dialysis.  Last GFR 13.9, Cr 3.6.  Hypertensive today to 180s SBP.     S/p   10/27/22  Creation of 1st stage L BVT AVF     Findings/Key Components:  L basilic vein dilated to 3-4mm; segment of 8-10cm was mobilized at a/c fossa  Strong thrill  Triphasic L ulnar doppler signal with minimal augmentation with AVF compression  Obese upper arm (BMI 38)  Consider transposition in 3-6months+       No hx of MI  Hx of CHF, EF 65%%, enlarged RA   Stroke ~3 years ago, no residual deficits   Tobacco use: never     Past Medical History:   Diagnosis Date    Acute respiratory failure with hypoxia and hypercapnia 5/26/2019    ATN (acute tubular necrosis) 2/8/2019    Chronic diastolic heart failure 2/7/2019 2-8-19 Mild left atrial enlargement. Mild left ventricular enlargement. Normal left ventricular systolic function. The estimated ejection fraction is 55% Indeterminate left ventricular diastolic function. Normal right ventricular systolic function. Mild mitral regurgitation. Mild to moderate tricuspid regurgitation. The estimated PA systolic pressure is 32 mm Hg Normal central venous pressure (3 m    CKD (chronic kidney disease) stage 4, GFR 15-29 ml/min 5/16/2018    CKD stage G3a/A3, GFR 45-59 and albumin creatinine ratio >300 mg/g 5/16/2018    CKD stage G3b/A3, GFR 30-44 and albumin creatinine ratio >300 mg/g 5/16/2018    Closed compression fracture of L2 lumbar vertebra 5/24/2019    Closed compression fracture of L2 lumbar vertebra 5/24/2019     IMO Regulatory Load October 2019    Controlled type 2 diabetes with retinopathy 4/9/2018    COVID-19 virus infection 7-1-2021 7/1/2021    CVA (cerebral vascular accident) 2017    Diabetic  polyneuropathy associated with type 2 diabetes mellitus 12/4/2020    Essential hypertension 4/9/2018    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  7-31-19 Factor XI Activity 55 - 145 % 96       Gastroesophageal reflux disease without esophagitis 5/24/2019    Generalized anxiety disorder 11/8/2019    GERD (gastroesophageal reflux disease)     GIB (gastrointestinal bleeding) 10/18/2018    History of hepatitis C     s/p succesful Rx w/ Harvoni  - SVR12 (cure) 2016    Hypertensive retinopathy, bilateral 12/10/2019    Mild nonproliferative diabetic retinopathy 05/02/2018    Mixed hyperlipidemia 4/9/2018    Myalgia due to statin 9/7/2022    Normocytic anemia 1/14/2022    NS (nuclear sclerosis), bilateral 12/10/2019    PNA (pneumonia) 5/28/2019    Postoperative hypothyroidism     Renovascular hypertension 4/9/2018    S/P kyphoplasty 8/14/2019 8/14/19: L2 kyphoplasty with Dr. Kwon     Slow transit constipation 5/27/2019    Type 2 diabetes mellitus with circulatory disorder, with long-term current use of insulin 4/9/2018    Type 2 diabetes mellitus with stage 3 chronic kidney disease, without long-term current use of insulin 4/9/2018    Type 2 diabetes mellitus with stage 3a chronic kidney disease, without long-term current use of insulin 3/3/2021    Type 2 diabetes mellitus with stage 3b chronic kidney disease, without long-term current use of insulin 9/7/2022     Past Surgical History:   Procedure Laterality Date    AV FISTULA PLACEMENT Left 10/27/2022    Procedure: CREATION, AV FISTULA;  Surgeon: Tirso Tavera MD;  Location: [facility] OR [address] [place] [address];  Service: Peripheral Vascular;  Laterality: Left;  Brachio basilic 1st stage     BACK SURGERY  2017    CHOLECYSTECTOMY  2015    [facility]    COLONOSCOPY  11/13/2015    Dr Chino torres prep. internal hemorrhoids. diverticulosis of sigmois. NO polyps. repeat due 2025    FIXATION KYPHOPLASTY Bilateral 08/14/2019    Procedure: L2 kyphoplasty Fluoro Globus;   Surgeon: Jeremie Kwon DO;  Location: [facility] OR;  Service: Neurosurgery;  Laterality: Bilateral;  CROW [place]OW [phone] TEXTED HER @ 10:54AM ON 7-30-19  PRE-OP-BY RN  8-7-2019    HYSTERECTOMY      OOPHORECTOMY  1996    one    THYROIDECTOMY  2016    at [facility] for thyroid nodule    TONSILLECTOMY       Family History   Problem Relation Age of Onset    Stroke Mother     Heart disease Mother     Hypertension Mother     Diabetes type II Mother     Coronary artery disease Mother     Hypertension Father     Prostate cancer Father     Aneurysm Brother      Social History     Socioeconomic History    Marital status:    Tobacco Use    Smoking status: Never    Smokeless tobacco: Never   Substance and Sexual Activity    Alcohol use: Not Currently    Drug use: No    Sexual activity: Not Currently   Social History Narrative    Pt enjoys cooking and baking and making Floral arrangement and [employer]      Social Determinants of Health     Financial Resource Strain: Low Risk     Difficulty of Paying Living Expenses: Not very hard   Food Insecurity: No Food Insecurity    Worried About Running Out of Food in the Last Year: Never true    Ran Out of Food in the Last Year: Never true   Transportation Needs: No Transportation Needs    Lack of Transportation (Medical): No    Lack of Transportation (Non-Medical): No   Physical Activity: Insufficiently Active    Days of Exercise per Week: 1 day    Minutes of Exercise per Session: 30 min   Stress: No Stress Concern Present    Feeling of Stress : Not at all   Social Connections: Unknown    Frequency of Communication with Friends and Family: More than three times a week    Frequency of Social Gatherings with Friends and Family: More than three times a week    Active Member of Clubs or Organizations: Patient refused    Attends Club or Organization Meetings: Never    Marital Status:    Housing Stability: Low Risk     Unable to Pay for Housing in the Last Year: No    Number  of Places Lived in the Last Year: 1    Unstable Housing in the Last Year: No       Current Outpatient Medications:     amLODIPine (NORVASC) 10 MG tablet, Take 1 tablet (10 mg total) by mouth once daily. (Patient taking differently: Take 10 mg by mouth nightly.), Disp: 90 tablet, Rfl: 3    carvediloL (COREG) 25 MG tablet, Take 1 tablet (25 mg total) by mouth 2 (two) times daily with meals., Disp: 180 tablet, Rfl: 3    cholecalciferol, vitamin D3, 125 mcg (5,000 unit) Tab, Take 1 tablet (5,000 Units total) by mouth once daily., Disp: 90 tablet, Rfl: 3    EScitalopram oxalate (LEXAPRO) 10 MG tablet, Take 1 tablet (10 mg total) by mouth once daily., Disp: 90 tablet, Rfl: 3    furosemide (LASIX) 40 MG tablet, Take 1 tablet (40 mg total) by mouth once daily., Disp: 90 tablet, Rfl: 3    HYDROcodone-acetaminophen (NORCO) 5-325 mg per tablet, Take 1 tablet by mouth every 6 (six) hours as needed for Pain., Disp: 15 tablet, Rfl: 0    levocetirizine (XYZAL) 5 MG tablet, Take 1 tablet (5 mg total) by mouth daily as needed for Allergies., Disp: 90 tablet, Rfl: 3    ondansetron (ZOFRAN-ODT) 4 MG TbDL, Take 1 tablet (4 mg total) by mouth every 8 (eight) hours as needed (nausea)., Disp: 90 tablet, Rfl: 2    sodium bicarbonate 325 MG tablet, Take 2 tablets (650 mg total) by mouth 2 (two) times daily., Disp: 120 tablet, Rfl: 6    SYNTHROID 200 mcg tablet, Take 1 tablet (200 mcg total) by mouth before breakfast., Disp: 90 tablet, Rfl: 3    traZODone (DESYREL) 50 MG tablet, Take 1 to 2 tablets ( mg total) by mouth nightly as needed for Insomnia., Disp: 120 tablet, Rfl: 3    REVIEW OF SYSTEMS:  General: negative; ENT: negative; Allergy and Immunology: negative; Hematological and Lymphatic: Negative; Endocrine: negative; Respiratory: no cough, shortness of breath, or wheezing; Cardiovascular: no chest pain or dyspnea on exertion; Gastrointestinal: no abdominal pain/back, change in bowel habits, or bloody stools; Genito-Urinary: no  dysuria, trouble voiding, or hematuria; Musculoskeletal: negative  Neurological: no TIA or stroke symptoms; Psychiatric: no nervousness, anxiety or depression.    PHYSICAL EXAM:   Right Arm BP - Sittin/79 (22 1008)  Pulse: 71  Temp: 98.4 °F (36.9 °C)      General appearance:  Alert, well-appearing, and in no distress.  Oriented to person, place, and time   Neurological: Normal speech, no focal findings noted; CN II - XII grossly intact           Musculoskeletal: Digits/nail without cyanosis/clubbing.  Normal muscle strength/tone.                 Neck: Supple, no significant adenopathy; thyroid is not enlarged                Chest:  Clear to auscultation, no wheezes, rales or rhonchi, symmetric air entry     No use of accessory muscles             Cardiac: Normal rate and regular rhythm, S1 and S2 normal; PMI non-displaced          Abdomen: Soft, nontender, nondistended, no masses or organomegaly     No rebound tenderness noted; bowel sounds normal     No groin adenopathy      Extremities:   3 cm scar overlying L antecubital fossa      Weak 1+ L radial pulse, 2+ L brachial pulse      Significant obesity upper arm      + bruit L upper arm     + Scar's test     No ulcerations    LAB RESULTS:  Lab Results   Component Value Date    K 3.6 2022    K 4.0 2022    K 4.3 2022    CREATININE 4.2 (H) 2022    CREATININE 3.6 (H) 2022    CREATININE 3.6 (H) 2022     Lab Results   Component Value Date    WBC 11.52 2022    WBC 10.78 2022    WBC 11.83 2022    HCT 32.0 (L) 2022    HCT 29.2 (L) 10/03/2022    HCT 28.1 (L) 2022     2022     (L) 2022     2022     Lab Results   Component Value Date    HGBA1C 5.5 2022    HGBA1C 6.8 (H) 2022    HGBA1C 6.2 (H) 2021     IMAGING:  AVF u/s: flow vol 1127 ml/min  PSVs 688 and 597 cm/s proximally -- 'long segment narrowing beyond anastomosis'    IMP/PLAN:     62 y.o.  female with a h/o DMII with retinopathy, HTN, stroke and obesity (BMI 40), and multiple AKIs the last was from urinary retention -- I did a L 1st stage brachial artery/vein AVFin need of future AV access  Weight loss   Next, needs L TRA PTA MERARI 12/16/22  Plan for 2nd stage transposition 1/24/23; note obese upper arm will require careful dissection     Tirso Tavera MD DFSVS FACS   Vascular/Endovascular Surgery

## 2022-11-23 NOTE — PROGRESS NOTES
Transplant Surgery  Kidney Transplant Recipient Evaluation    Referring Physician: Cuba Kimball  Current Nephrologist: Cuba Kimball    Subjective:     Reason for Visit: evaluate transplant candidacy    History of Present Illness: Kathie Quezada is a 62 y.o. year old female undergoing transplant evaluation.    Dialysis History: Kathie is pre-dialysis.      Transplant History: N/A    Etiology of Renal Disease: Diabetes Mellitus - Type II (based on medical records from referral).    External provider notes reviewed: Yes    Review of Systems   Constitutional: Negative.    Respiratory: Negative.     Cardiovascular: Negative.    Gastrointestinal: Negative.    Genitourinary: Negative.    Musculoskeletal:  Positive for arthralgias.   Hematological:  Bruises/bleeds easily.   Objective:     Physical Exam:  Constitutional:   Vitals reviewed: yes   Well-nourished and well-groomed: yes  Eyes:   Sclerae icteric: no   Extraocular movements intact: yes  GI:    Bowel sounds normal: yes   Tenderness: no    If yes, quadrant/location: not applicable   Palpable masses: no    If yes, quadrant/location: not applicable   Hepatosplenomegaly: no   Ascites: no   Hernia: no    If yes, type/location: not applicable   Surgical scars: yes    If yes, type/location: laparoscopic port sites  Resp:   Effort normal: yes   Breath sounds normal: yes    CV:   Regular rate and rhythm: yes   Heart sounds normal: yes   Femoral pulses normal: yes   Extremities edematous: yes  Skin:   Rashes or lesions present: no    If yes, describe:not applicable   Jaundice:: no    Musculoskeletal:   Gait normal: yes   Strength normal: yes  Psych:   Oriented to person, place, and time: yes   Affect and mood normal: yes    Additional comments: not applicable    Diagnostics:  The following labs have been reviewed: CBC  CMP  PT  INR  The following radiology images have been independently reviewed and interpreted: Iliac doppler  CT Abd/Pelvis    Counseling: We  provided Kathie Quezada with a group education session today.  We discussed kidney transplantation at length with her, including risks, potential complications, and alternatives in the management of her renal failure.  The discussion included complications related to anesthesia, bleeding, infection, primary nonfunction, and ATN.  I discussed the typical postoperative course, length of hospitalization, the need for long-term immunosuppression, and the need for long-term routine follow-up.  I discussed living-donor and -donor transplantation and the relative advantages and disadvantages of each.  I also discussed average waiting times for both living donation and  donation.  I discussed national and center-specific survival rates.  I also mentioned the potential benefit of multicenter listing to candidates listed with centers within more than one organ procurement organization.  All questions were answered.    Patient advised that it is recommended that all transplant candidates, and their close contacts and household members receive Covid vaccination.    Final determination of transplant candidacy will be made once evaluation is complete and reviewed by the Kidney & Kidney/Pancreas Selection Committee.    Coronavirus disease (COVID-19) caused by severe acute respiratory virus coronavirus 2 (SARS-C0V 2) is associated with increased mortality in solid organ transplant recipients (SOT) compared to non-transplant patients. Vaccine responses to vaccination are depressed against SARS-CoV2 compared to normal individuals but improve with third vaccination doses. Vaccination prior to SOT provides both the best opportunity for transplant candidates to develop protective immunity and to reduce the risk of serious COVID19 infections post transplantation. Organ transplant candidates at [facility] will be required to receive SARS-CoV-2 vaccination prior to being listed  with a an active status, whenever possible. Exceptions will be made for disability related reasons or for sincerely held Jainism beliefs.          Transplant Surgery - Candidacy   Assessment/Plan:   Kathie Quezada is pre-dialysis with CKD stage 4 (GFR 15-29 mL/min). I see no surgical contraindication to placing a kidney transplant. Based on available information, Kathie Quezada is a high-risk kidney transplant candidate.     Abdominal Habitus: Large abdominal habitus. Not an absolute contraindication by itself.   Patient instructed on increased risks associated with BMI and body habitus and encouraged to lose weight.     Vascular/Technical Concerns: None based on exam or imaging at this time (doppler, CT).  No reported history of vascular disease, no femoral catheters, no lower extremity grafts, no iliofemoral venous thromboembolism. The patient is not on anticoagulation. Patient does have factor XI deficiency    Urologic Concerns: None based on available information. Patient reports normal volume of urine per day.  No history of recurrent UTI, no apparent obstructive symptoms or voiding dysfunction, no history of self catheterization    Other surgical considerations/concerns:  Increased risk due to history of CVA, DM, factor XI deficiency, and BMI.  Additional risk stratification pending     Additional testing to be completed according to the Written Order Guidelines for Adult Pre-kidney and Pancreas Transplant Evaluation (KI-02).  Interpretation of tests and discussion of patient management involves all members of the multidisciplinary transplant team.    Luis Edmond MD

## 2022-11-23 NOTE — PROGRESS NOTES
Kathie Quezada  11/23/2022    HPI:  Patient is a 62 y.o. female with a h/o DMII with retinopathy, HTN, CVA, Factor 11 deficiency, and multiple AKIS the last was from urinary retention who is here today for f/u.  She is R handed with an obese upper arm.  Pt is unsure of timeframe for needing dialysis.  Last GFR 13.9, Cr 3.6.  Hypertensive today to 180s SBP.     S/p   10/27/22  Creation of 1st stage L BVT AVF     Findings/Key Components:  L basilic vein dilated to 3-4mm; segment of 8-10cm was mobilized at a/c fossa  Strong thrill  Triphasic L ulnar doppler signal with minimal augmentation with AVF compression  Obese upper arm (BMI 38)  Consider transposition in 3-6months+       No hx of MI  Hx of CHF, EF 65%%, enlarged RA   Stroke ~3 years ago, no residual deficits   Tobacco use: never     Past Medical History:   Diagnosis Date    Acute respiratory failure with hypoxia and hypercapnia 5/26/2019    ATN (acute tubular necrosis) 2/8/2019    Chronic diastolic heart failure 2/7/2019 2-8-19 Mild left atrial enlargement. Mild left ventricular enlargement. Normal left ventricular systolic function. The estimated ejection fraction is 55% Indeterminate left ventricular diastolic function. Normal right ventricular systolic function. Mild mitral regurgitation. Mild to moderate tricuspid regurgitation. The estimated PA systolic pressure is 32 mm Hg Normal central venous pressure (3 m    CKD (chronic kidney disease) stage 4, GFR 15-29 ml/min 5/16/2018    CKD stage G3a/A3, GFR 45-59 and albumin creatinine ratio >300 mg/g 5/16/2018    CKD stage G3b/A3, GFR 30-44 and albumin creatinine ratio >300 mg/g 5/16/2018    Closed compression fracture of L2 lumbar vertebra 5/24/2019    Closed compression fracture of L2 lumbar vertebra 5/24/2019     IMO Regulatory Load October 2019    Controlled type 2 diabetes with retinopathy 4/9/2018    COVID-19 virus infection 7-1-2021 7/1/2021    CVA (cerebral vascular accident) 2017    Diabetic  polyneuropathy associated with type 2 diabetes mellitus 12/4/2020    Essential hypertension 4/9/2018    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  7-31-19 Factor XI Activity 55 - 145 % 96       Gastroesophageal reflux disease without esophagitis 5/24/2019    Generalized anxiety disorder 11/8/2019    GERD (gastroesophageal reflux disease)     GIB (gastrointestinal bleeding) 10/18/2018    History of hepatitis C     s/p succesful Rx w/ Harvoni  - [id number] (cure) 2016    Hypertensive retinopathy, bilateral 12/10/2019    Mild nonproliferative diabetic retinopathy 05/02/2018    Mixed hyperlipidemia 4/9/2018    Myalgia due to statin 9/7/2022    Normocytic anemia 1/14/2022    NS (nuclear sclerosis), bilateral 12/10/2019    PNA (pneumonia) 5/28/2019    Postoperative hypothyroidism     Renovascular hypertension 4/9/2018    S/P kyphoplasty 8/14/2019 8/14/19: L2 kyphoplasty with Dr. Kwon     Slow transit constipation 5/27/2019    Type 2 diabetes mellitus with circulatory disorder, with long-term current use of insulin 4/9/2018    Type 2 diabetes mellitus with stage 3 chronic kidney disease, without long-term current use of insulin 4/9/2018    Type 2 diabetes mellitus with stage 3a chronic kidney disease, without long-term current use of insulin 3/3/2021    Type 2 diabetes mellitus with stage 3b chronic kidney disease, without long-term current use of insulin 9/7/2022     Past Surgical History:   Procedure Laterality Date    AV FISTULA PLACEMENT Left 10/27/2022    Procedure: CREATION, AV FISTULA;  Surgeon: Tirso Tavera MD;  Location: [facility] OR [address] [place] [address];  Service: Peripheral Vascular;  Laterality: Left;  Brachio basilic 1st stage     BACK SURGERY  2017    CHOLECYSTECTOMY  2015    [place]    COLONOSCOPY  11/13/2015    Dr Chino torres prep. internal hemorrhoids. diverticulosis of sigmois. NO polyps. repeat due 2025    FIXATION KYPHOPLASTY Bilateral 08/14/2019    Procedure: L2 kyphoplasty Fluoro Globus;   Surgeon: Jeremie Kwon DO;  Location: [facility] OR;  Service: Neurosurgery;  Laterality: Bilateral;  CROW WINSLOW [phone] TEXTED HER @ 10:54AM ON 7-30-19  PRE-OP-BY RN  8-7-2019    HYSTERECTOMY      OOPHORECTOMY  1996    one    THYROIDECTOMY  2016    at [facility] for thyroid nodule    TONSILLECTOMY       Family History   Problem Relation Age of Onset    Stroke Mother     Heart disease Mother     Hypertension Mother     Diabetes type II Mother     Coronary artery disease Mother     Hypertension Father     Prostate cancer Father     Aneurysm Brother      Social History     Socioeconomic History    Marital status:    Tobacco Use    Smoking status: Never    Smokeless tobacco: Never   Substance and Sexual Activity    Alcohol use: Not Currently    Drug use: No    Sexual activity: Not Currently   Social History Narrative    Pt enjoys cooking and baking and making Floral arrangement and [employer]      Social Determinants of Health     Financial Resource Strain: Low Risk     Difficulty of Paying Living Expenses: Not very hard   Food Insecurity: No Food Insecurity    Worried About Running Out of Food in the Last Year: Never true    Ran Out of Food in the Last Year: Never true   Transportation Needs: No Transportation Needs    Lack of Transportation (Medical): No    Lack of Transportation (Non-Medical): No   Physical Activity: Insufficiently Active    Days of Exercise per Week: 1 day    Minutes of Exercise per Session: 30 min   Stress: No Stress Concern Present    Feeling of Stress : Not at all   Social Connections: Unknown    Frequency of Communication with Friends and Family: More than three times a week    Frequency of Social Gatherings with Friends and Family: More than three times a week    Active Member of Clubs or Organizations: Patient refused    Attends Club or Organization Meetings: Never    Marital Status:    Housing Stability: Low Risk     Unable to Pay for Housing in the Last Year: No    Number  of Places Lived in the Last Year: 1    Unstable Housing in the Last Year: No       Current Outpatient Medications:     amLODIPine (NORVASC) 10 MG tablet, Take 1 tablet (10 mg total) by mouth once daily. (Patient taking differently: Take 10 mg by mouth nightly.), Disp: 90 tablet, Rfl: 3    carvediloL (COREG) 25 MG tablet, Take 1 tablet (25 mg total) by mouth 2 (two) times daily with meals., Disp: 180 tablet, Rfl: 3    cholecalciferol, vitamin D3, 125 mcg (5,000 unit) Tab, Take 1 tablet (5,000 Units total) by mouth once daily., Disp: 90 tablet, Rfl: 3    EScitalopram oxalate (LEXAPRO) 10 MG tablet, Take 1 tablet (10 mg total) by mouth once daily., Disp: 90 tablet, Rfl: 3    furosemide (LASIX) 40 MG tablet, Take 1 tablet (40 mg total) by mouth once daily., Disp: 90 tablet, Rfl: 3    HYDROcodone-acetaminophen (NORCO) 5-325 mg per tablet, Take 1 tablet by mouth every 6 (six) hours as needed for Pain., Disp: 15 tablet, Rfl: 0    levocetirizine (XYZAL) 5 MG tablet, Take 1 tablet (5 mg total) by mouth daily as needed for Allergies., Disp: 90 tablet, Rfl: 3    ondansetron (ZOFRAN-ODT) 4 MG TbDL, Take 1 tablet (4 mg total) by mouth every 8 (eight) hours as needed (nausea)., Disp: 90 tablet, Rfl: 2    sodium bicarbonate 325 MG tablet, Take 2 tablets (650 mg total) by mouth 2 (two) times daily., Disp: 120 tablet, Rfl: 6    SYNTHROID 200 mcg tablet, Take 1 tablet (200 mcg total) by mouth before breakfast., Disp: 90 tablet, Rfl: 3    traZODone (DESYREL) 50 MG tablet, Take 1 to 2 tablets ( mg total) by mouth nightly as needed for Insomnia., Disp: 120 tablet, Rfl: 3    REVIEW OF SYSTEMS:  General: negative; ENT: negative; Allergy and Immunology: negative; Hematological and Lymphatic: Negative; Endocrine: negative; Respiratory: no cough, shortness of breath, or wheezing; Cardiovascular: no chest pain or dyspnea on exertion; Gastrointestinal: no abdominal pain/back, change in bowel habits, or bloody stools; Genito-Urinary: no  dysuria, trouble voiding, or hematuria; Musculoskeletal: negative  Neurological: no TIA or stroke symptoms; Psychiatric: no nervousness, anxiety or depression.    PHYSICAL EXAM:   Right Arm BP - Sittin/79 (22 1008)  Pulse: 71  Temp: 98.4 °F (36.9 °C)      General appearance:  Alert, well-appearing, and in no distress.  Oriented to person, place, and time   Neurological: Normal speech, no focal findings noted; CN II - XII grossly intact           Musculoskeletal: Digits/nail without cyanosis/clubbing.  Normal muscle strength/tone.                 Neck: Supple, no significant adenopathy; thyroid is not enlarged                Chest:  Clear to auscultation, no wheezes, rales or rhonchi, symmetric air entry     No use of accessory muscles             Cardiac: Normal rate and regular rhythm, S1 and S2 normal; PMI non-displaced          Abdomen: Soft, nontender, nondistended, no masses or organomegaly     No rebound tenderness noted; bowel sounds normal     No groin adenopathy      Extremities:   3 cm scar overlying L antecubital fossa      Weak 1+ L radial pulse, 2+ L brachial pulse      Significant obesity upper arm      + bruit L upper arm     + Csar's test     No ulcerations    LAB RESULTS:  Lab Results   Component Value Date    K 3.6 2022    K 4.0 2022    K 4.3 2022    CREATININE 4.2 (H) 2022    CREATININE 3.6 (H) 2022    CREATININE 3.6 (H) 2022     Lab Results   Component Value Date    WBC 11.52 2022    WBC 10.78 2022    WBC 11.83 2022    HCT 32.0 (L) 2022    HCT 29.2 (L) 10/03/2022    HCT 28.1 (L) 2022     2022     (L) 2022     2022     Lab Results   Component Value Date    HGBA1C 5.5 2022    HGBA1C 6.8 (H) 2022    HGBA1C 6.2 (H) 2021     IMAGING:  AVF u/s: flow vol 1127 ml/min  PSVs 688 and 597 cm/s proximally -- 'long segment narrowing beyond anastomosis'    IMP/PLAN:     62 y.o.  female with a h/o DMII with retinopathy, HTN, stroke and obesity (BMI 40), and multiple AKIs the last was from urinary retention -- I did a L 1st stage brachial artery/vein AVFin need of future AV access  Weight loss   Next, needs L TRA PTA MERARI 12/16/22  Plan for 2nd stage transposition 1/24/23; note obese upper arm will require careful dissection     Tirso Tavera MD DFSVS FACS   Vascular/Endovascular Surgery

## 2022-11-27 PROBLEM — Z86.73 HISTORY OF CVA (CEREBROVASCULAR ACCIDENT) WITHOUT RESIDUAL DEFICITS: Status: ACTIVE | Noted: 2022-11-27

## 2022-11-27 PROBLEM — Z86.16 HISTORY OF COVID-19: Status: ACTIVE | Noted: 2021-07-01

## 2022-11-27 PROBLEM — Z86.16 HISTORY OF COVID-19: Status: RESOLVED | Noted: 2021-07-01 | Resolved: 2022-01-14

## 2022-11-27 PROBLEM — Z01.810 PREOPERATIVE CARDIOVASCULAR EXAMINATION: Status: ACTIVE | Noted: 2022-11-27

## 2022-11-28 ENCOUNTER — HOSPITAL ENCOUNTER (OUTPATIENT)
Dept: CARDIOLOGY | Facility: HOSPITAL | Age: 62
Discharge: HOME | End: 2022-11-28
Attending: NURSE PRACTITIONER
Payer: MEDICARE

## 2022-11-28 ENCOUNTER — OFFICE VISIT (OUTPATIENT)
Dept: CARDIOLOGY | Facility: CLINIC | Age: 62
End: 2022-11-28
Payer: MEDICARE

## 2022-11-28 VITALS
HEIGHT: 67 IN | HEART RATE: 88 BPM | SYSTOLIC BLOOD PRESSURE: 143 MMHG | WEIGHT: 252 LBS | DIASTOLIC BLOOD PRESSURE: 67 MMHG | BODY MASS INDEX: 39.55 KG/M2

## 2022-11-28 VITALS
WEIGHT: 255 LBS | HEART RATE: 81 BPM | DIASTOLIC BLOOD PRESSURE: 88 MMHG | BODY MASS INDEX: 40.02 KG/M2 | SYSTOLIC BLOOD PRESSURE: 182 MMHG | HEIGHT: 67 IN

## 2022-11-28 DIAGNOSIS — I10 HYPERTENSION, UNSPECIFIED TYPE: ICD-10-CM

## 2022-11-28 DIAGNOSIS — Z91.89 CARDIOVASCULAR EVENT RISK: ICD-10-CM

## 2022-11-28 DIAGNOSIS — D68.1 FACTOR XI DEFICIENCY: ICD-10-CM

## 2022-11-28 DIAGNOSIS — Z01.810 PREOPERATIVE CARDIOVASCULAR EXAMINATION: Primary | ICD-10-CM

## 2022-11-28 DIAGNOSIS — N18.5 CHRONIC KIDNEY DISEASE (CKD), STAGE V: ICD-10-CM

## 2022-11-28 DIAGNOSIS — E78.5 HYPERLIPIDEMIA, UNSPECIFIED HYPERLIPIDEMIA TYPE: ICD-10-CM

## 2022-11-28 DIAGNOSIS — Z86.16 HISTORY OF COVID-19: ICD-10-CM

## 2022-11-28 DIAGNOSIS — E66.01 CLASS 2 SEVERE OBESITY DUE TO EXCESS CALORIES WITH SERIOUS COMORBIDITY AND BODY MASS INDEX (BMI) OF 35.0 TO 35.9 IN ADULT: ICD-10-CM

## 2022-11-28 DIAGNOSIS — I50.32 CHRONIC DIASTOLIC HEART FAILURE: ICD-10-CM

## 2022-11-28 DIAGNOSIS — Z86.73 HISTORY OF CVA (CEREBROVASCULAR ACCIDENT) WITHOUT RESIDUAL DEFICITS: ICD-10-CM

## 2022-11-28 DIAGNOSIS — E11.3553 CONTROLLED TYPE 2 DIABETES MELLITUS WITH STABLE PROLIFERATIVE RETINOPATHY OF BOTH EYES, UNSPECIFIED WHETHER LONG TERM INSULIN USE: ICD-10-CM

## 2022-11-28 LAB
CFR FLOW - ANTERIOR: 1.38
CFR FLOW - INFERIOR: 1.32
CFR FLOW - LATERAL: 1.36
CFR FLOW - MAX: 1.75
CFR FLOW - MIN: 1.1
CFR FLOW - SEPTAL: 1.37
CFR FLOW - WHOLE HEART: 1.36
CV PHARM DOSE: 0.4 MG
CV STRESS BASE HR: 81 BPM
DIASTOLIC BLOOD PRESSURE: 88 MMHG
EJECTION FRACTION- HIGH: 59 %
END DIASTOLIC INDEX-HIGH: 155 ML/M2
END DIASTOLIC INDEX-LOW: 91 ML/M2
END SYSTOLIC INDEX-HIGH: 78 ML/M2
END SYSTOLIC INDEX-LOW: 40 ML/M2
NUC REST DIASTOLIC VOLUME INDEX: 124
NUC REST EJECTION FRACTION: 67
NUC REST SYSTOLIC VOLUME INDEX: 41
NUC STRESS DIASTOLIC VOLUME INDEX: 127
NUC STRESS EJECTION FRACTION: 68 %
NUC STRESS SYSTOLIC VOLUME INDEX: 40
OHS CV CPX 85 PERCENT MAX PREDICTED HEART RATE MALE: 130
OHS CV CPX MAX PREDICTED HEART RATE: 153
OHS CV CPX PATIENT IS FEMALE: 1
OHS CV CPX PATIENT IS MALE: 0
OHS CV CPX PEAK DIASTOLIC BLOOD PRESSURE: 93 MMHG
OHS CV CPX PEAK HEAR RATE: 88 BPM
OHS CV CPX PEAK RATE PRESSURE PRODUCT: NORMAL
OHS CV CPX PEAK SYSTOLIC BLOOD PRESSURE: 207 MMHG
OHS CV CPX PERCENT MAX PREDICTED HEART RATE ACHIEVED: 57
OHS CV CPX RATE PRESSURE PRODUCT PRESENTING: NORMAL
REST FLOW - ANTERIOR: 1.02 CC/MIN/G
REST FLOW - INFERIOR: 1.14 CC/MIN/G
REST FLOW - LATERAL: 1.01 CC/MIN/G
REST FLOW - MAX: 1.39 CC/MIN/G
REST FLOW - MIN: 0.72 CC/MIN/G
REST FLOW - SEPTAL: 0.96 CC/MIN/G
REST FLOW - WHOLE HEART: 1.03 CC/MIN/G
RETIRED EF AND QEF - SEE NOTES: 47 %
STRESS FLOW - ANTERIOR: 1.4 CC/MIN/G
STRESS FLOW - INFERIOR: 1.51 CC/MIN/G
STRESS FLOW - LATERAL: 1.37 CC/MIN/G
STRESS FLOW - MAX: 2.02 CC/MIN/G
STRESS FLOW - MIN: 0.95 CC/MIN/G
STRESS FLOW - SEPTAL: 1.3 CC/MIN/G
STRESS FLOW - WHOLE HEART: 1.4 CC/MIN/G
SYSTOLIC BLOOD PRESSURE: 182 MMHG

## 2022-11-28 PROCEDURE — 1159F MED LIST DOCD IN RCRD: CPT | Mod: CPTII,S$GLB,TXP, | Performed by: INTERNAL MEDICINE

## 2022-11-28 PROCEDURE — 99204 PR OFFICE/OUTPT VISIT, NEW, LEVL IV, 45-59 MIN: ICD-10-PCS | Mod: S$GLB,TXP,, | Performed by: INTERNAL MEDICINE

## 2022-11-28 PROCEDURE — 93016 CARDIAC PET SCAN STRESS (CUPID ONLY): ICD-10-PCS | Mod: TXP,,, | Performed by: INTERNAL MEDICINE

## 2022-11-28 PROCEDURE — 78434 CARDIAC PET SCAN STRESS (CUPID ONLY): ICD-10-PCS | Mod: 26,TXP,, | Performed by: INTERNAL MEDICINE

## 2022-11-28 PROCEDURE — 93016 CV STRESS TEST SUPVJ ONLY: CPT | Mod: TXP,,, | Performed by: INTERNAL MEDICINE

## 2022-11-28 PROCEDURE — 3044F PR MOST RECENT HEMOGLOBIN A1C LEVEL <7.0%: ICD-10-PCS | Mod: CPTII,S$GLB,TXP, | Performed by: INTERNAL MEDICINE

## 2022-11-28 PROCEDURE — 3044F HG A1C LEVEL LT 7.0%: CPT | Mod: CPTII,S$GLB,TXP, | Performed by: INTERNAL MEDICINE

## 2022-11-28 PROCEDURE — 93018 CARDIAC PET SCAN STRESS (CUPID ONLY): ICD-10-PCS | Mod: TXP,,, | Performed by: INTERNAL MEDICINE

## 2022-11-28 PROCEDURE — 1160F PR REVIEW ALL MEDS BY PRESCRIBER/CLIN PHARMACIST DOCUMENTED: ICD-10-PCS | Mod: CPTII,S$GLB,TXP, | Performed by: INTERNAL MEDICINE

## 2022-11-28 PROCEDURE — 3078F PR MOST RECENT DIASTOLIC BLOOD PRESSURE < 80 MM HG: ICD-10-PCS | Mod: CPTII,S$GLB,TXP, | Performed by: INTERNAL MEDICINE

## 2022-11-28 PROCEDURE — 3078F DIAST BP <80 MM HG: CPT | Mod: CPTII,S$GLB,TXP, | Performed by: INTERNAL MEDICINE

## 2022-11-28 PROCEDURE — 63600175 PHARM REV CODE 636 W HCPCS: Mod: TXP | Performed by: NURSE PRACTITIONER

## 2022-11-28 PROCEDURE — 1160F RVW MEDS BY RX/DR IN RCRD: CPT | Mod: CPTII,S$GLB,TXP, | Performed by: INTERNAL MEDICINE

## 2022-11-28 PROCEDURE — A9555 RB82 RUBIDIUM: HCPCS | Mod: TXP

## 2022-11-28 PROCEDURE — 3077F PR MOST RECENT SYSTOLIC BLOOD PRESSURE >= 140 MM HG: ICD-10-PCS | Mod: CPTII,S$GLB,TXP, | Performed by: INTERNAL MEDICINE

## 2022-11-28 PROCEDURE — 1159F PR MEDICATION LIST DOCUMENTED IN MEDICAL RECORD: ICD-10-PCS | Mod: CPTII,S$GLB,TXP, | Performed by: INTERNAL MEDICINE

## 2022-11-28 PROCEDURE — 93018 CV STRESS TEST I&R ONLY: CPT | Mod: TXP,,, | Performed by: INTERNAL MEDICINE

## 2022-11-28 PROCEDURE — 78431 CARDIAC PET SCAN STRESS (CUPID ONLY): ICD-10-PCS | Mod: 26,TXP,, | Performed by: INTERNAL MEDICINE

## 2022-11-28 PROCEDURE — 99204 OFFICE O/P NEW MOD 45 MIN: CPT | Mod: S$GLB,TXP,, | Performed by: INTERNAL MEDICINE

## 2022-11-28 PROCEDURE — 3066F NEPHROPATHY DOC TX: CPT | Mod: CPTII,S$GLB,TXP, | Performed by: INTERNAL MEDICINE

## 2022-11-28 PROCEDURE — 99999 PR PBB SHADOW E&M-EST. PATIENT-LVL III: ICD-10-PCS | Mod: PBBFAC,TXP,, | Performed by: INTERNAL MEDICINE

## 2022-11-28 PROCEDURE — 3077F SYST BP >= 140 MM HG: CPT | Mod: CPTII,S$GLB,TXP, | Performed by: INTERNAL MEDICINE

## 2022-11-28 PROCEDURE — 99999 PR PBB SHADOW E&M-EST. PATIENT-LVL III: CPT | Mod: PBBFAC,TXP,, | Performed by: INTERNAL MEDICINE

## 2022-11-28 PROCEDURE — 78434 AQMBF PET REST & RX STRESS: CPT | Mod: 26,TXP,, | Performed by: INTERNAL MEDICINE

## 2022-11-28 PROCEDURE — 3008F PR BODY MASS INDEX (BMI) DOCUMENTED: ICD-10-PCS | Mod: CPTII,S$GLB,TXP, | Performed by: INTERNAL MEDICINE

## 2022-11-28 PROCEDURE — 3066F PR DOCUMENTATION OF TREATMENT FOR NEPHROPATHY: ICD-10-PCS | Mod: CPTII,S$GLB,TXP, | Performed by: INTERNAL MEDICINE

## 2022-11-28 PROCEDURE — 3008F BODY MASS INDEX DOCD: CPT | Mod: CPTII,S$GLB,TXP, | Performed by: INTERNAL MEDICINE

## 2022-11-28 PROCEDURE — 78431 MYOCRD IMG PET RST&STRS CT: CPT | Mod: 26,TXP,, | Performed by: INTERNAL MEDICINE

## 2022-11-28 RX ORDER — CAFFEINE CITRATE 20 MG/ML
60 SOLUTION INTRAVENOUS
Status: COMPLETED | OUTPATIENT
Start: 2022-11-28 | End: 2022-11-28

## 2022-11-28 RX ORDER — REGADENOSON 0.08 MG/ML
0.4 INJECTION, SOLUTION INTRAVENOUS ONCE
Status: COMPLETED | OUTPATIENT
Start: 2022-11-28 | End: 2022-11-28

## 2022-11-28 RX ADMIN — CAFFEINE CITRATE 60 MG: 20 INJECTION INTRAVENOUS at 09:11

## 2022-11-28 RX ADMIN — REGADENOSON 0.4 MG: 0.08 INJECTION, SOLUTION INTRAVENOUS at 09:11

## 2022-11-28 NOTE — Clinical Note
Thank you for referring Kathie Abbey Hamptonblanc for preoperative cardiovascular evaluation. Please see my note for details of this encounter. If you have any questions, please contact me.  Thank you again for the referral.

## 2022-11-28 NOTE — PROGRESS NOTES
REASON FOR VISIT: ckd    REFERRING PHYSICIAN: Elan Price MD      HISTORY OF PRESENT ILLNESS: 62 y.o. female who  has long standing DMII with retinopathy, HTN, CVA, and multiple AKIS the last was from urinary retention. patient was referred here for abnormal renal function, her renal function has been declining due to above mentioned reasons, she used to see us in the clinic but lost follow up.     No major complains during this clinic visit.    ALLERGIES:  Review of patient's allergies indicates:   Allergen Reactions    Atorvastatin Other (See Comments)     Statin induced myalgia    Mushroom Hives    Bactrim [sulfamethoxazole-trimethoprim] Hives    Codeine Hives    Gabapentin      Pruritis     Iodine     Peanut butter flavor     Penicillins Hives    Shellfish containing products Itching    Sulfa (sulfonamide antibiotics) Hives       MEDICATIONS:    Current Outpatient Medications:     amLODIPine (NORVASC) 10 MG tablet, Take 1 tablet (10 mg total) by mouth once daily. (Patient taking differently: Take 10 mg by mouth nightly.), Disp: 90 tablet, Rfl: 3    carvediloL (COREG) 25 MG tablet, Take 1 tablet (25 mg total) by mouth 2 (two) times daily with meals., Disp: 180 tablet, Rfl: 3    cholecalciferol, vitamin D3, 125 mcg (5,000 unit) Tab, Take 1 tablet (5,000 Units total) by mouth once daily., Disp: 90 tablet, Rfl: 3    EScitalopram oxalate (LEXAPRO) 10 MG tablet, Take 1 tablet (10 mg total) by mouth once daily., Disp: 90 tablet, Rfl: 3    furosemide (LASIX) 40 MG tablet, Take 1 tablet (40 mg total) by mouth once daily., Disp: 90 tablet, Rfl: 3    levocetirizine (XYZAL) 5 MG tablet, Take 1 tablet (5 mg total) by mouth daily as needed for Allergies., Disp: 90 tablet, Rfl: 3    ondansetron (ZOFRAN-ODT) 4 MG TbDL, Take 1 tablet (4 mg total) by mouth every 8 (eight) hours as needed (nausea)., Disp: 90 tablet, Rfl: 2    sodium bicarbonate 325 MG tablet, Take 2 tablets (650 mg total) by mouth 2 (two) times daily., Disp: 120  "tablet, Rfl: 6    SYNTHROID 200 mcg tablet, Take 1 tablet (200 mcg total) by mouth before breakfast., Disp: 90 tablet, Rfl: 3    traZODone (DESYREL) 50 MG tablet, Take 1 to 2 tablets ( mg total) by mouth nightly as needed for Insomnia., Disp: 120 tablet, Rfl: 3    HYDROcodone-acetaminophen (NORCO) 5-325 mg per tablet, Take 1 tablet by mouth every 6 (six) hours as needed for Pain., Disp: 15 tablet, Rfl: 0   Medication List with Changes/Refills   Current Medications    AMLODIPINE (NORVASC) 10 MG TABLET    Take 1 tablet (10 mg total) by mouth once daily.    CARVEDILOL (COREG) 25 MG TABLET    Take 1 tablet (25 mg total) by mouth 2 (two) times daily with meals.    CHOLECALCIFEROL, VITAMIN D3, 125 MCG (5,000 UNIT) TAB    Take 1 tablet (5,000 Units total) by mouth once daily.    ESCITALOPRAM OXALATE (LEXAPRO) 10 MG TABLET    Take 1 tablet (10 mg total) by mouth once daily.    FUROSEMIDE (LASIX) 40 MG TABLET    Take 1 tablet (40 mg total) by mouth once daily.    HYDROCODONE-ACETAMINOPHEN (NORCO) 5-325 MG PER TABLET    Take 1 tablet by mouth every 6 (six) hours as needed for Pain.    LEVOCETIRIZINE (XYZAL) 5 MG TABLET    Take 1 tablet (5 mg total) by mouth daily as needed for Allergies.    ONDANSETRON (ZOFRAN-ODT) 4 MG TBDL    Take 1 tablet (4 mg total) by mouth every 8 (eight) hours as needed (nausea).    SODIUM BICARBONATE 325 MG TABLET    Take 2 tablets (650 mg total) by mouth 2 (two) times daily.    SYNTHROID 200 MCG TABLET    Take 1 tablet (200 mcg total) by mouth before breakfast.    TRAZODONE (DESYREL) 50 MG TABLET    Take 1 to 2 tablets ( mg total) by mouth nightly as needed for Insomnia.        PHYSICAL EXAM:  /80   Pulse 71   Ht 5' 7.64" (1.718 m)   Wt 115.7 kg (255 lb 1.2 oz)   SpO2 (!) 92%   BMI 39.20 kg/m²     General: No distress, No fever or chills  Head: Normocephalic,atraumatic  Eyes: conjunctivae/corneas clear. PERRL, EOM's intact.  Nose: Nares normal. Mucosa normal. No drainage or " sinus tenderness.  Neck: No adenopathy,no carotid bruit,no JVD  Lungs:Clear to auscultation bilaterally, No Crackles  Heart: Regular rate and rhythm, no murmur, gallops or rubs  Abdomen: Soft, no tenderness, bowel sounds normal  Extremities: Atraumatic, no edema in LE  Skin: Turgor normal. No rashes or ulcers  Neurologic: No focal weakness, oriented.  Dialysis Access: Non applicable         LABS:  Lab Results   Component Value Date    CREATININE 4.2 (H) 11/17/2022       Prot/Creat Ratio, Urine   Date Value Ref Range Status   05/27/2019 1.37 (H) 0.00 - 0.20 Final   02/21/2019 1.70 (H) 0.00 - 0.20 Final   08/23/2018 3.85 (H) 0.00 - 0.20 Final       Lab Results   Component Value Date     11/17/2022    K 3.6 11/17/2022    CO2 21 (L) 11/17/2022       last PTH   Lab Results   Component Value Date    .9 (H) 11/17/2022    CALCIUM 8.7 11/17/2022    PHOS 4.6 (H) 11/17/2022       Lab Results   Component Value Date    HGB 10.0 (L) 11/17/2022        Lab Results   Component Value Date    HGBA1C 5.5 11/17/2022       Lab Results   Component Value Date    LDLCALC 105.6 11/17/2022           ASSESSMENT:    CKD V  -likely from DMII (with retinopathy) /uncontrolled HTN/PVD (CVA with residual weakness) and recurrent AKIs  -Cr baseline above 3 and progressing.  -UPCR 1.5 g/g, used to be in nephrotic range before: spep,HIV,Hep panel wnl  -Renal US ckd    HTN  Controlled better at home on Coreg, Thiazide and Amlodipine     Anemia  -from ckd  -Hgb goal ~ 10  -Iron stores low    Secondary Hyperparathyroidism  -Phos high/Ca acceptable  -PTH acceptable      Acid/Base  -Metabolic acidosis              PLAN:  -Continue current BP meds regimen   -Already Referred to transplant and vascular sx  -dietitian consult  -Low salt diet < 2 g/d  -Avoid NSAIDs intake      RTC in 2 months with labs      Thanks for allowing me to participate in the care of this patient.     3:32 PM

## 2022-11-28 NOTE — PROGRESS NOTES
Chart has been dictated using voice recognition software.  It is not been reviewed carefully for any transcriptional errors due to this technology.   Subjective:   Patient ID:  Kathie Quezada is a 62 y.o. female who presents for evaluation of No chief complaint on file.      HPI:  Patient with type 2 diabetes with retinopathy, hypertension, past stroke about 2007 (symptom was double vision), factor 11 deficiency, heart failure due to hypertensive episode in 1996 (has not recurred), and CKD 5 presents for preoperative  cardiovascular evaluation in evaluation for renal transplantation.  The patient is having a PET stress test today.    Transthoracic echocardiogram (12-September-2022):   Severe left atrial enlargement.  The left ventricle is normal in size with mild eccentric hypertrophy and normal systolic function.  The estimated ejection fraction is 65%.  Indeterminate left ventricular diastolic function.  Normal right ventricular size with normal right ventricular systolic function.  Mild mitral regurgitation.  Normal central venous pressure (3 mmHg).    Patient denies any chest discomfort on exertion or at rest.  Patient denies any dyspnea at rest or on exertion, orthopnea, or PND. Has occasional edema.  Patient denies any lightheadedness or syncope. Has occasional isolated palpitations but did not feel any during stress test this morning (although the patient had several PVCs). ECG portion of PET stress this morning showed no ischemia or symptoms, although slightly lightheaded post test. PET scan results are  pending      Cardiac risk factors: diabetes, female older than 55 years of age, hypertension, obesity, positive family history, sedentary lifestyle    Past Medical History:   Diagnosis Date    Acute respiratory failure with hypoxia and hypercapnia 5/26/2019    ATN (acute tubular necrosis) 2/8/2019    Chronic diastolic heart failure 2/7/2019 2-8-19 Mild left atrial enlargement. Mild left  ventricular enlargement. Normal left ventricular systolic function. The estimated ejection fraction is 55% Indeterminate left ventricular diastolic function. Normal right ventricular systolic function. Mild mitral regurgitation. Mild to moderate tricuspid regurgitation. The estimated PA systolic pressure is 32 mm Hg Normal central venous pressure (3 m    CKD (chronic kidney disease) stage 4, GFR 15-29 ml/min 5/16/2018    CKD stage G3a/A3, GFR 45-59 and albumin creatinine ratio >300 mg/g 5/16/2018    CKD stage G3b/A3, GFR 30-44 and albumin creatinine ratio >300 mg/g 5/16/2018    Closed compression fracture of L2 lumbar vertebra 5/24/2019    Closed compression fracture of L2 lumbar vertebra 5/24/2019     IMO Regulatory Load October 2019    Controlled type 2 diabetes with retinopathy 4/9/2018    COVID-19 virus infection 7-1-2021 7/1/2021    CVA (cerebral vascular accident) 2017    Diabetic polyneuropathy associated with type 2 diabetes mellitus 12/4/2020    Essential hypertension 4/9/2018    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  7-31-19 Factor XI Activity 55 - 145 % 96       Gastroesophageal reflux disease without esophagitis 5/24/2019    Generalized anxiety disorder 11/8/2019    GERD (gastroesophageal reflux disease)     GIB (gastrointestinal bleeding) 10/18/2018    History of hepatitis C     s/p succesful Rx w/ Harv[facility]  - SVR12 (cure) 2016    Hypertensive retinopathy, bilateral 12/10/2019    Mild nonproliferative diabetic retinopathy 05/02/2018    Mixed hyperlipidemia 4/9/2018    Myalgia due to statin 9/7/2022    Normocytic anemia 1/14/2022    NS (nuclear sclerosis), bilateral 12/10/2019    PNA (pneumonia) 5/28/2019    Postoperative hypothyroidism     Renovascular hypertension 4/9/2018    S/P kyphoplasty 8/14/2019 8/14/19: L2 kyphoplasty with Dr. Kwon     Slow transit constipation 5/27/2019    Type 2 diabetes mellitus with circulatory disorder, with long-term current use of insulin 4/9/2018     Type 2 diabetes mellitus with stage 3 chronic kidney disease, without long-term current use of insulin 4/9/2018    Type 2 diabetes mellitus with stage 3a chronic kidney disease, without long-term current use of insulin 3/3/2021    Type 2 diabetes mellitus with stage 3b chronic kidney disease, without long-term current use of insulin 9/7/2022       Past Surgical History:   Procedure Laterality Date    AV FISTULA PLACEMENT Left 10/27/2022    Procedure: CREATION, AV FISTULA;  Surgeon: Tirso Tavera MD;  Location: [facility] OR [address] [place] [address];  Service: Peripheral Vascular;  Laterality: Left;  Brachio basilic 1st stage     BACK SURGERY  2017    CHOLECYSTECTOMY  2015    [facility]    COLONOSCOPY  11/13/2015    Dr Chino torres prep. internal hemorrhoids. diverticulosis of sigmois. NO polyps. repeat due 2025    FIXATION KYPHOPLASTY Bilateral 08/14/2019    Procedure: L2 kyphoplasty Fluoro Globus;  Surgeon: Jeremie Kwon DO;  Location: [facility] OR;  Service: Neurosurgery;  Laterality: Bilateral;  GLOBUS MACRINA WINSLOW [phone] TEXTED HER @ 10:54AM ON 7-30-19  PRE-OP-BY RN  8-7-2019    HYSTERECTOMY      OOPHORECTOMY  1996    one    THYROIDECTOMY  2016    at [facility] for thyroid nodule    TONSILLECTOMY         Social History     Tobacco Use    Smoking status: Never    Smokeless tobacco: Never   Substance Use Topics    Alcohol use: Not Currently    Drug use: No       Outpatient Medications Prior to Visit   Medication Sig Dispense Refill    amLODIPine (NORVASC) 10 MG tablet Take 1 tablet (10 mg total) by mouth once daily. (Patient taking differently: Take 10 mg by mouth nightly.) 90 tablet 3    carvediloL (COREG) 25 MG tablet Take 1 tablet (25 mg total) by mouth 2 (two) times daily with meals. 180 tablet 3    cholecalciferol, vitamin D3, 125 mcg (5,000 unit) Tab Take 1 tablet (5,000 Units total) by mouth once daily. 90 tablet 3    EScitalopram oxalate (LEXAPRO) 10 MG tablet Take 1 tablet (10 mg total) by mouth once daily. 90 tablet 3     "furosemide (LASIX) 40 MG tablet Take 1 tablet (40 mg total) by mouth once daily. 90 tablet 3    HYDROcodone-acetaminophen (NORCO) 5-325 mg per tablet Take 1 tablet by mouth every 6 (six) hours as needed for Pain. 15 tablet 0    levocetirizine (XYZAL) 5 MG tablet Take 1 tablet (5 mg total) by mouth daily as needed for Allergies. 90 tablet 3    ondansetron (ZOFRAN-ODT) 4 MG TbDL Take 1 tablet (4 mg total) by mouth every 8 (eight) hours as needed (nausea). 90 tablet 2    sodium bicarbonate 325 MG tablet Take 2 tablets (650 mg total) by mouth 2 (two) times daily. 120 tablet 6    SYNTHROID 200 mcg tablet Take 1 tablet (200 mcg total) by mouth before breakfast. 90 tablet 3    traZODone (DESYREL) 50 MG tablet Take 1 to 2 tablets ( mg total) by mouth nightly as needed for Insomnia. 120 tablet 3     No facility-administered medications prior to visit.       Review of patient's allergies indicates:   Allergen Reactions    Atorvastatin Other (See Comments)     Statin induced myalgia    Mushroom Hives    Bactrim [sulfamethoxazole-trimethoprim] Hives    Codeine Hives    Gabapentin      Pruritis     Iodine     Peanut butter flavor     Penicillins Hives    Shellfish containing products Itching    Sulfa (sulfonamide antibiotics) Hives       Review of Systems   Constitutional: Negative for weight gain and weight loss.   HENT:  Negative for nosebleeds.    Respiratory:  Negative for hemoptysis.    Hematologic/Lymphatic: Negative for bleeding problem. Does not bruise/bleed easily.   Musculoskeletal:  Positive for back pain.   Gastrointestinal:  Positive for diarrhea. Negative for hematemesis and hematochezia.   Genitourinary:  Negative for hematuria.   Neurological:  Negative for focal weakness, numbness and weakness.    Objective:   Physical Exam  Constitutional:       Appearance: She is well-developed. She is obese.      Comments: BP (!) 143/67 (BP Location: Right arm, Patient Position: Sitting)   Pulse 88   Ht 5' 7" (1.702 " m)   Wt 114.3 kg (251 lb 15.8 oz)   BMI 39.47 kg/m²      Neck:      Vascular: No carotid bruit or JVD.   Cardiovascular:      Rate and Rhythm: Normal rate and regular rhythm.      Pulses: Intact distal pulses.      Heart sounds: Normal heart sounds. No murmur heard.    No friction rub. No gallop.   Pulmonary:      Effort: Pulmonary effort is normal.      Breath sounds: Normal breath sounds. No wheezing or rales.   Abdominal:      General: Bowel sounds are normal. There is no abdominal bruit.      Palpations: Abdomen is soft. There is no hepatomegaly.      Tenderness: There is no abdominal tenderness.   Musculoskeletal:      Cervical back: Neck supple.      Right lower leg: Edema (trace-1+ ankle and pretibial edema) present.      Left lower leg: Edema (trace-1+ ankle and pretibial edema) present.   Skin:     Nails: There is no clubbing.   Neurological:      Mental Status: She is alert and oriented to person, place, and time.       Lab Results   Component Value Date    WBC 11.52 11/17/2022    HGB 10.0 (L) 11/17/2022    HCT 32.0 (L) 11/17/2022    MCV 84 11/17/2022     11/17/2022       Lab Results   Component Value Date     11/17/2022    K 3.6 11/17/2022    BUN 34 (H) 11/17/2022    CREATININE 4.2 (H) 11/17/2022     (H) 11/17/2022    HGBA1C 5.5 11/17/2022    CHOL 179 11/17/2022    HDL 53 11/17/2022    LDLCALC 105.6 11/17/2022    TRIG 102 11/17/2022    CHOLHDL 29.6 11/17/2022    HGB 10.0 (L) 11/17/2022    HCT 32.0 (L) 11/17/2022    HCT 35 (L) 06/09/2021     11/17/2022    INR 1.1 11/17/2022     ASCVD+ 10 year cardiovascular risk =  3.0%    ECG (today) normal sinus rhythm with non-specific ST-T wave changes.    Assessment:     1. Preoperative cardiovascular examination    2. Hyperlipidemia, unspecified hyperlipidemia type    3. Controlled type 2 diabetes mellitus with stable proliferative retinopathy of both eyes, unspecified whether long term insulin use    4. Hypertension, unspecified type     5. Chronic diastolic heart failure    6. Chronic kidney disease (CKD), stage V    7. History of CVA (cerebrovascular accident) without residual deficits    8. Class 2 severe obesity due to excess calories with serious comorbidity and body mass index (BMI) of 35.0 to 35.9 in adult    9. History of COVID-19    10. Factor XI deficiency      Patient has no symptoms of cardiac ischemia, heart failure, or significant arrhythmias.  The patient had no symptoms or ECG changes during the PET stress test, but the PET images are pending.  Patient has no signs of heart failure at this time.  Her lower extremity edema is most likely secondary to a combination of amlodipine and the patient's obesity.  Her systolic blood pressure is high today, but the patient states that she did not take her antihypertensive medications.  The patient is in optimal condition for any type of surgery at this time, at this time at this time although she remains at a relatively high risk for surgery given her past history (although remote) of congestive heart failure and CVA.  No change in therapy is needed at this time and she should should be followed by her primary care doctors and the transplant nephrologists.  No further cardiovascular evaluation is needed at this time (assuming no significant ischemia on her PET scans).    All other cardiovascular HCC diagnoses not discussed above are currently stable and do not need a change in management at this time.  All non-cardiovascular HCC diagnoses are not contributing to any cardiovascular problem at this time, unless mentioned above in the HPI and/or Assessment, and will be managed by the primary and/or appropriate specialty care providers.      At this time, there is nothing further to add to this patient's care from a cardiovascular point of view. Unless the patient has further cardiovascular problems, there is no need for the patient to return for re-evaluation.  However, I would be happy to see  the patient again if needed.   Plan:     Diagnoses and all orders for this visit:    Preoperative cardiovascular examination    Hyperlipidemia, unspecified hyperlipidemia type    Controlled type 2 diabetes mellitus with stable proliferative retinopathy of both eyes, unspecified whether long term insulin use    Hypertension, unspecified type    Chronic diastolic heart failure    Chronic kidney disease (CKD), stage V    History of CVA (cerebrovascular accident) without residual deficits    Class 2 severe obesity due to excess calories with serious comorbidity and body mass index (BMI) of 35.0 to 35.9 in adult    History of COVID-19    Factor XI deficiency        Yayo Terry MD  Consultative Cardiology

## 2022-12-07 ENCOUNTER — TELEPHONE (OUTPATIENT)
Dept: VASCULAR SURGERY | Facility: CLINIC | Age: 62
End: 2022-12-07
Payer: MEDICARE

## 2022-12-07 ENCOUNTER — DOCUMENTATION ONLY (OUTPATIENT)
Dept: VASCULAR SURGERY | Facility: CLINIC | Age: 62
End: 2022-12-07
Payer: MEDICARE

## 2022-12-07 DIAGNOSIS — Z76.82 ORGAN TRANSPLANT CANDIDATE: Primary | ICD-10-CM

## 2022-12-07 NOTE — TELEPHONE ENCOUNTER
Spoke with Fish pharmacist at [facility] and prescription called in for iodine prep for procedure on 12/16/22.Fish readback and confirmed prep.

## 2022-12-12 ENCOUNTER — TELEPHONE (OUTPATIENT)
Dept: TRANSPLANT | Facility: CLINIC | Age: 62
End: 2022-12-12
Payer: MEDICARE

## 2022-12-12 NOTE — TELEPHONE ENCOUNTER
----- Message from Werner Estrella Jr., MD sent at 12/11/2022  3:27 PM CST -----  Regarding: RE: ct review  Looks acceptable  ----- Message -----  From: Phyllis Chaparro RN  Sent: 12/7/2022   3:01 PM CST  To: Lynne Kirkpatrick RN, Werner Estrella Jr., MD  Subject: ct review                                        Hi Dr. Estrella,  Please review ct abd/pelvis in [facility] 7/30/22 and comment if favorable for transplant.     Respectfully,  Phyllis

## 2022-12-16 ENCOUNTER — HOSPITAL ENCOUNTER (OUTPATIENT)
Facility: HOSPITAL | Age: 62
Discharge: HOME | End: 2022-12-16
Attending: SURGERY | Admitting: SURGERY
Payer: MEDICARE

## 2022-12-16 VITALS
WEIGHT: 260.13 LBS | TEMPERATURE: 98 F | RESPIRATION RATE: 16 BRPM | HEIGHT: 69 IN | OXYGEN SATURATION: 100 % | SYSTOLIC BLOOD PRESSURE: 168 MMHG | BODY MASS INDEX: 38.53 KG/M2 | DIASTOLIC BLOOD PRESSURE: 79 MMHG | HEART RATE: 81 BPM

## 2022-12-16 DIAGNOSIS — N18.6 ESRD (END STAGE RENAL DISEASE): Primary | ICD-10-CM

## 2022-12-16 DIAGNOSIS — Z98.890 S/P ARTERIOVENOUS (AV) FISTULA CREATION: ICD-10-CM

## 2022-12-16 LAB — POCT GLUCOSE: 164 MG/DL (ref 70–110)

## 2022-12-16 PROCEDURE — 25500020 PHARM REV CODE 255: Mod: NTX | Performed by: SURGERY

## 2022-12-16 PROCEDURE — 99152 MOD SED SAME PHYS/QHP 5/>YRS: CPT | Mod: TXP | Performed by: SURGERY

## 2022-12-16 PROCEDURE — 99152 PR MOD CONSCIOUS SEDATION, SAME PHYS, 5+ YRS, FIRST 15 MIN: ICD-10-PCS | Mod: NTX,,, | Performed by: SURGERY

## 2022-12-16 PROCEDURE — C1894 INTRO/SHEATH, NON-LASER: HCPCS | Mod: TXP | Performed by: SURGERY

## 2022-12-16 PROCEDURE — 25000003 PHARM REV CODE 250: Mod: NTX | Performed by: SURGERY

## 2022-12-16 PROCEDURE — 99153 MOD SED SAME PHYS/QHP EA: CPT | Mod: TXP | Performed by: SURGERY

## 2022-12-16 PROCEDURE — 27201423 OPTIME MED/SURG SUP & DEVICES STERILE SUPPLY: Mod: TXP | Performed by: SURGERY

## 2022-12-16 PROCEDURE — 36902 INTRO CATH DIALYSIS CIRCUIT: CPT | Mod: LT,TXP | Performed by: SURGERY

## 2022-12-16 PROCEDURE — C1725 CATH, TRANSLUMIN NON-LASER: HCPCS | Mod: NTX | Performed by: SURGERY

## 2022-12-16 PROCEDURE — 99152 MOD SED SAME PHYS/QHP 5/>YRS: CPT | Mod: NTX,,, | Performed by: SURGERY

## 2022-12-16 PROCEDURE — 63600175 PHARM REV CODE 636 W HCPCS: Mod: NTX | Performed by: SURGERY

## 2022-12-16 PROCEDURE — 36902 INTRO CATH DIALYSIS CIRCUIT: CPT | Mod: NTX,,, | Performed by: SURGERY

## 2022-12-16 PROCEDURE — 63600175 PHARM REV CODE 636 W HCPCS: Mod: TXP | Performed by: SURGERY

## 2022-12-16 PROCEDURE — 36902 PR INTRO CATH, DIALYSIS CIRCUIT W/TRANSLML BALLOON ANGIO: ICD-10-PCS | Mod: NTX,,, | Performed by: SURGERY

## 2022-12-16 PROCEDURE — C1769 GUIDE WIRE: HCPCS | Mod: NTX | Performed by: SURGERY

## 2022-12-16 PROCEDURE — 25000003 PHARM REV CODE 250: Mod: TXP | Performed by: SURGERY

## 2022-12-16 RX ORDER — PREDNISONE 50 MG/1
50 TABLET ORAL SEE ADMIN INSTRUCTIONS
COMMUNITY
Start: 2022-12-05 | End: 2023-01-04

## 2022-12-16 RX ORDER — CLINDAMYCIN PHOSPHATE 900 MG/50ML
900 INJECTION, SOLUTION INTRAVENOUS
Status: DISCONTINUED | OUTPATIENT
Start: 2022-12-16 | End: 2022-12-16 | Stop reason: HOSPADM

## 2022-12-16 RX ORDER — HEPARIN SOD,PORCINE/0.9 % NACL 1000/500ML
INTRAVENOUS SOLUTION INTRAVENOUS
Status: DISCONTINUED | OUTPATIENT
Start: 2022-12-16 | End: 2022-12-16 | Stop reason: HOSPADM

## 2022-12-16 RX ORDER — DIPHENHYDRAMINE HCL 50 MG/1
50 CAPSULE ORAL SEE ADMIN INSTRUCTIONS
COMMUNITY
Start: 2022-12-05 | End: 2023-01-04

## 2022-12-16 RX ORDER — HYDRALAZINE HYDROCHLORIDE 20 MG/ML
INJECTION INTRAMUSCULAR; INTRAVENOUS
Status: DISCONTINUED | OUTPATIENT
Start: 2022-12-16 | End: 2022-12-16 | Stop reason: HOSPADM

## 2022-12-16 RX ORDER — LIDOCAINE HYDROCHLORIDE 10 MG/ML
INJECTION INFILTRATION; PERINEURAL
Status: DISCONTINUED | OUTPATIENT
Start: 2022-12-16 | End: 2022-12-16 | Stop reason: HOSPADM

## 2022-12-16 RX ORDER — FENTANYL CITRATE 50 UG/ML
INJECTION, SOLUTION INTRAMUSCULAR; INTRAVENOUS
Status: DISCONTINUED | OUTPATIENT
Start: 2022-12-16 | End: 2022-12-16 | Stop reason: HOSPADM

## 2022-12-16 RX ORDER — SODIUM CHLORIDE 9 MG/ML
INJECTION, SOLUTION INTRAVENOUS CONTINUOUS
Status: DISCONTINUED | OUTPATIENT
Start: 2022-12-16 | End: 2022-12-16 | Stop reason: HOSPADM

## 2022-12-16 RX ORDER — MIDAZOLAM HYDROCHLORIDE 1 MG/ML
INJECTION, SOLUTION INTRAMUSCULAR; INTRAVENOUS
Status: DISCONTINUED | OUTPATIENT
Start: 2022-12-16 | End: 2022-12-16 | Stop reason: HOSPADM

## 2022-12-16 RX ORDER — HEPARIN SODIUM 1000 [USP'U]/ML
INJECTION, SOLUTION INTRAVENOUS; SUBCUTANEOUS
Status: DISCONTINUED | OUTPATIENT
Start: 2022-12-16 | End: 2022-12-16 | Stop reason: HOSPADM

## 2022-12-16 RX ORDER — LABETALOL HYDROCHLORIDE 5 MG/ML
INJECTION, SOLUTION INTRAVENOUS
Status: DISCONTINUED | OUTPATIENT
Start: 2022-12-16 | End: 2022-12-16 | Stop reason: HOSPADM

## 2022-12-16 NOTE — PLAN OF CARE
Patient arrived to room. PIV placed, BG is 164. Admit assessment completed. Plan of care discussed with patient. Will monitor

## 2022-12-16 NOTE — Clinical Note
A venogram was performed of the Left Arm Fistula. The venogram syringe was removed and the venogram is complete.

## 2022-12-16 NOTE — BRIEF OP NOTE
Brief Operative Note  Date: 12/16/2022    Surgeon:  Tirso Tavera MD [facility]    Assitant: RICHAR Escobar MD    Pre-op Diagnosis:  Threatended AVF; evidence of stenosis  T82.858A: Stenosis of vascular prosthetic devices, implants and grafts, initial encounter    Post-op Diagnosis:  Same    Procedure(s):  1) U/S-guided L transradial access; L 1st stage brachial-basisliv AVF fistulagram  2) PTA proximal (diffuse) stenoses x3 with 6x60mm Sterlin balloon  3) Conscious sedation    Anesthesia: Local MAC    Findings/Key Components:  Successful treatment of proximal stenosis  Good palpable thrill    EBL: < 10 ml    Anesthesia: IV regional    Findings: Resolution of stenosis; palpable thrill           Complications:  None; patient tolerated the procedure well.           Disposition: Recoery- hemodynamically stable in good condition    Attending Attestation: I was present and scrubbed for the entire procedure.      Specimens (From admission, onward)      None          I attest to being present for the procedure and performing the case.  Tirso Tavera MD [facility]  Discharge Note  SUMMARY    Admit Date: 12/16/2022    Attending Physician: Tirso Tavera MD [facility]    Discharge Physician: Tirso Tavera MD [facility]    Discharge Date: 12/16/2022    Final Diagnosis: S/P arteriovenous (AV) fistula creation [Z98.890]    Outcome of Treatment: Successful PTA    Disposition: Home or self-care    Patient Instructions:   Current Discharge Medication List        CONTINUE these medications which have NOT CHANGED    Details   amLODIPine (NORVASC) 10 MG tablet Take 1 tablet (10 mg total) by mouth once daily.  Qty: 90 tablet, Refills: 3    Comments: .  Associated Diagnoses: Chronic diastolic heart failure; Renovascular hypertension      BANOPHEN 50 mg capsule Take 50 mg by mouth As instructed.      carvediloL (COREG) 25 MG tablet Take 1 tablet (25 mg total) by mouth 2 (two) times daily with meals.  Qty: 180 tablet, Refills: 3    Comments:  .  Associated Diagnoses: Chronic diastolic heart failure; Renovascular hypertension      cholecalciferol, vitamin D3, 125 mcg (5,000 unit) Tab Take 1 tablet (5,000 Units total) by mouth once daily.  Qty: 90 tablet, Refills: 3    Associated Diagnoses: Vitamin D deficiency      EScitalopram oxalate (LEXAPRO) 10 MG tablet Take 1 tablet (10 mg total) by mouth once daily.  Qty: 90 tablet, Refills: 3    Associated Diagnoses: Generalized anxiety disorder      furosemide (LASIX) 40 MG tablet Take 1 tablet (40 mg total) by mouth once daily.  Qty: 90 tablet, Refills: 3    Associated Diagnoses: Chronic diastolic heart failure; CKD (chronic kidney disease) stage 4, GFR 15-29 ml/min      levocetirizine (XYZAL) 5 MG tablet Take 1 tablet (5 mg total) by mouth daily as needed for Allergies.  Qty: 90 tablet, Refills: 3    Associated Diagnoses: Sinus congestion      ondansetron (ZOFRAN-ODT) 4 MG TbDL Take 1 tablet (4 mg total) by mouth every 8 (eight) hours as needed (nausea).  Qty: 90 tablet, Refills: 2    Associated Diagnoses: Nausea      predniSONE (DELTASONE) 50 MG Tab Take 50 mg by mouth As instructed.      sodium bicarbonate 325 MG tablet Take 2 tablets (650 mg total) by mouth 2 (two) times daily.  Qty: 120 tablet, Refills: 6      SYNTHROID 200 mcg tablet Take 1 tablet (200 mcg total) by mouth before breakfast.  Qty: 90 tablet, Refills: 3    Associated Diagnoses: Postoperative hypothyroidism      HYDROcodone-acetaminophen (NORCO) 5-325 mg per tablet Take 1 tablet by mouth every 6 (six) hours as needed for Pain.  Qty: 15 tablet, Refills: 0    Comments: Quantity prescribed more than 7 day supply? No      traZODone (DESYREL) 50 MG tablet Take 1 to 2 tablets ( mg total) by mouth nightly as needed for Insomnia.  Qty: 120 tablet, Refills: 3    Associated Diagnoses: Generalized anxiety disorder             Diet:  Resume pre-operative diet    Activity:  Ad oli    Follow-up:  Follow-up in clinic with Dr Tavera within 4-6 weeks;  please call clinic nurse at

## 2022-12-16 NOTE — NURSING
TR band removed per protocol. Pt tolerated well. Instructions given on wrist safety and restrictions. G&T dressing placed, no active bleeding, no hematoma noted. Will continue to monitor. Nurse call bell within reach.

## 2022-12-16 NOTE — OP NOTE
Date: 12/16/2022    Surgeon:  Tirso Tavera MD [facility]    Assitant: RICHAR Escobar MD    Pre-op Diagnosis:  Threatended AVF; evidence of stenosis  T82.858A: Stenosis of vascular prosthetic devices, implants and grafts, initial encounter    Post-op Diagnosis:  Same    Procedure(s):  1) U/S-guided L transradial access; L 1st stage brachial-basisliv AVF fistulagram  2) PTA proximal (diffuse) stenoses x3 with 6x60mm Sterlin balloon  3) Conscious sedation    Anesthesia: Local MAC    Findings/Key Components:  Successful treatment of proximal stenosis  Good palpable thrill    EBL: < 10 ml    Anesthesia: IV regional    Findings: Resolution of stenosis; palpable thrill           Complications:  None; patient tolerated the procedure well.           Disposition: Recoery- hemodynamically stable in good condition    Attending Attestation: I was present and scrubbed for the entire procedure.    Procedure detail: The patient was brought to the interventional suite, placed in supine. Arm was prepped and draped in the standard surgical fashion. Under ultrasound guidance, the radial artery was accessed with a micropuncture needle; ultrasound confirmed vessel patency, followed by placement of 4/3-Martiniquais micropuncture dilator. Through this, an 0.018-inch wire was placed in a 5/6-Martiniquais transradial sheath. Through this, an 0.018-inch Glidewire was placed through the high-grade stenosis, which was demonstrated by the angiogram.  The ultrasound demonstrated vessel patency. A high-grade stenosis in proximal stenosis was found, which was crossed with a hyrophilic 0.018 in glidewire. This was treated first with PTA proximal (diffuse) stenoses x3 with 6x60mm Sterlin balloon high-pressure, noncompliant balloon. Resolution of the stenosis was noted. Strong thrill could be felt. The sheath was removed and a trans-radial artery band placed with good hemostasis; hand was well-perfused and thrill palpable.    *MODERATE SEDATION IN CATH LAB    Tirso Tavera MD FACS was present for moderate sedation  Dr. Tavera monitored the patients cardio respiratory functions during the moderate sedation   See nurses notes for Intra-service start and end times and for the log of the name of the RN who administered the medicines for the moderate sedation, including their doseage and route.    Time of sedation:  45mins.

## 2022-12-16 NOTE — Clinical Note
The left radial and left brachial was prepped. The site was prepped with ChloraPrep. The patient was draped.

## 2022-12-16 NOTE — DISCHARGE INSTRUCTIONS
Discharge Instructions and Care of Your Wrist After a Cardiac Catheterization Procedure Performed via the Radial Artery    For 1 week following the procedure:  Do not subject your affected hand/arm to any forceful movements (i.e. supporting weight when rising from a chair or bed)  Avoid excessive (extension/flexion) wrist movement (i.e. supporting weight when rising from a chair or bed, push-ups, lifting garage doors, etc.)  Do not drive a car for 24 hours  The dressing on the puncture site may be removed after 24 hours and left open to air. If there is minor oozing, you may apply a Band-aid and remove after 12 hours  You may shower on the day following your procedure. Do not take a tub bath or submerge the puncture site in water for 3 days following the procedure  Do not lift anything heavier than 5 pounds with the affected hand/arm  Do not operate a lawnmower, motorcycle, chainsaw, or all-terrain vehicle   Do not engage in vigorous exercise (i.e. Tennis, Golf, Bowling) using the affected arm    If bleeding should occur following discharge:  Sit down and apply firm pressure to the puncture site with your fingers for 10 minutes   If the bleeding stops, continue to sit quietly, keeping your wrist straight for 2 hours. Notify your physician as soon as possible   If bleeding does not stop after 10 minutes or if there is a large amount of bleeding or spurting, call 911 immediately. Do not drive yourself to the hospital.     You should expect mild tingling in your hand and tenderness at the puncture site for up to 3 days.     Notify your physician if these symptoms persist or if you experience:  Change in color or temperature of the hand or arm  Redness, heat, or pus at the puncture site  Chills or fever greater than 101.0 F

## 2022-12-16 NOTE — NURSING
Received report from Madhu CATH RN. Patient s/p Fistulogram, AAOx3. VSS, no c/o pain or discomfort at this time, resp even and unlabored. TR band to L radial is CDI. No active bleeding. No hematoma noted. 15cc of air in band. Post procedure protocol reviewed with patient and patient's family called. Understanding verbalized. Nurse call bell within reach. Will continue to monitor per post procedure protocol.

## 2022-12-19 ENCOUNTER — LAB VISIT (OUTPATIENT)
Dept: LAB | Facility: HOSPITAL | Age: 62
End: 2022-12-19
Attending: INTERNAL MEDICINE
Payer: MEDICARE

## 2022-12-19 DIAGNOSIS — N18.5 CHRONIC KIDNEY DISEASE (CKD), STAGE V: ICD-10-CM

## 2022-12-19 LAB
ALBUMIN SERPL BCP-MCNC: 3.3 G/DL (ref 3.5–5.2)
ANION GAP SERPL CALC-SCNC: 11 MMOL/L (ref 8–16)
BASOPHILS # BLD AUTO: 0.07 K/UL (ref 0–0.2)
BASOPHILS NFR BLD: 0.6 % (ref 0–1.9)
BUN SERPL-MCNC: 39 MG/DL (ref 6–20)
CALCIUM SERPL-MCNC: 7.8 MG/DL (ref 8.7–10.5)
CHLORIDE SERPL-SCNC: 109 MMOL/L (ref 95–110)
CO2 SERPL-SCNC: 22 MMOL/L (ref 23–29)
CREAT SERPL-MCNC: 3.8 MG/DL (ref 0.5–1.4)
DIFFERENTIAL METHOD: ABNORMAL
EOSINOPHIL # BLD AUTO: 0.2 K/UL (ref 0–0.5)
EOSINOPHIL NFR BLD: 1.9 % (ref 0–8)
ERYTHROCYTE [DISTWIDTH] IN BLOOD BY AUTOMATED COUNT: 14.4 % (ref 11.5–14.5)
EST. GFR  (NO RACE VARIABLE): 13 ML/MIN/1.73 M^2
FERRITIN SERPL-MCNC: 108 NG/ML (ref 20–300)
GLUCOSE SERPL-MCNC: 111 MG/DL (ref 70–110)
HCT VFR BLD AUTO: 29.3 % (ref 37–48.5)
HGB BLD-MCNC: 9.2 G/DL (ref 12–16)
IMM GRANULOCYTES # BLD AUTO: 0.09 K/UL (ref 0–0.04)
IMM GRANULOCYTES NFR BLD AUTO: 0.7 % (ref 0–0.5)
IRON SERPL-MCNC: 46 UG/DL (ref 30–160)
LYMPHOCYTES # BLD AUTO: 2.7 K/UL (ref 1–4.8)
LYMPHOCYTES NFR BLD: 21.3 % (ref 18–48)
MCH RBC QN AUTO: 25.8 PG (ref 27–31)
MCHC RBC AUTO-ENTMCNC: 31.4 G/DL (ref 32–36)
MCV RBC AUTO: 82 FL (ref 82–98)
MONOCYTES # BLD AUTO: 0.8 K/UL (ref 0.3–1)
MONOCYTES NFR BLD: 5.9 % (ref 4–15)
NEUTROPHILS # BLD AUTO: 8.8 K/UL (ref 1.8–7.7)
NEUTROPHILS NFR BLD: 69.6 % (ref 38–73)
NRBC BLD-RTO: 0 /100 WBC
PHOSPHATE SERPL-MCNC: 4.3 MG/DL (ref 2.7–4.5)
PLATELET # BLD AUTO: 203 K/UL (ref 150–450)
PMV BLD AUTO: 10.3 FL (ref 9.2–12.9)
POTASSIUM SERPL-SCNC: 4.1 MMOL/L (ref 3.5–5.1)
RBC # BLD AUTO: 3.56 M/UL (ref 4–5.4)
SATURATED IRON: 15 % (ref 20–50)
SODIUM SERPL-SCNC: 142 MMOL/L (ref 136–145)
TOTAL IRON BINDING CAPACITY: 306 UG/DL (ref 250–450)
TRANSFERRIN SERPL-MCNC: 207 MG/DL (ref 200–375)
WBC # BLD AUTO: 12.63 K/UL (ref 3.9–12.7)

## 2022-12-19 PROCEDURE — 84466 ASSAY OF TRANSFERRIN: CPT | Mod: TXP | Performed by: INTERNAL MEDICINE

## 2022-12-19 PROCEDURE — 82728 ASSAY OF FERRITIN: CPT | Mod: TXP | Performed by: INTERNAL MEDICINE

## 2022-12-19 PROCEDURE — 36415 COLL VENOUS BLD VENIPUNCTURE: CPT | Mod: NTX | Performed by: INTERNAL MEDICINE

## 2022-12-19 PROCEDURE — 85025 COMPLETE CBC W/AUTO DIFF WBC: CPT | Mod: TXP | Performed by: INTERNAL MEDICINE

## 2022-12-19 PROCEDURE — 80069 RENAL FUNCTION PANEL: CPT | Mod: NTX | Performed by: INTERNAL MEDICINE

## 2022-12-20 ENCOUNTER — TELEPHONE (OUTPATIENT)
Dept: TRANSPLANT | Facility: CLINIC | Age: 62
End: 2022-12-20
Payer: MEDICARE

## 2022-12-20 NOTE — TELEPHONE ENCOUNTER
Spoke to patient; Gyn (1/12 [facility]), [place] [facility] [place] [facility] [place]16 [facility] [place] Stress Test, Echo and Cardiology complete (EPIC) Pt confirmed appointmet date time and location; the patient have no further questions or concerns at this time. reminder letter sent

## 2022-12-21 ENCOUNTER — LAB VISIT (OUTPATIENT)
Dept: LAB | Facility: HOSPITAL | Age: 62
End: 2022-12-21
Attending: INTERNAL MEDICINE
Payer: MEDICARE

## 2022-12-21 ENCOUNTER — OFFICE VISIT (OUTPATIENT)
Dept: HEMATOLOGY/ONCOLOGY | Facility: CLINIC | Age: 62
End: 2022-12-21
Payer: MEDICARE

## 2022-12-21 VITALS
WEIGHT: 263 LBS | RESPIRATION RATE: 17 BRPM | DIASTOLIC BLOOD PRESSURE: 65 MMHG | OXYGEN SATURATION: 94 % | SYSTOLIC BLOOD PRESSURE: 149 MMHG | HEART RATE: 75 BPM | BODY MASS INDEX: 38.95 KG/M2 | TEMPERATURE: 98 F | HEIGHT: 69 IN

## 2022-12-21 DIAGNOSIS — Z86.19 HISTORY OF HEPATITIS C: ICD-10-CM

## 2022-12-21 DIAGNOSIS — Z01.818 PRE-TRANSPLANT EVALUATION FOR KIDNEY TRANSPLANT: ICD-10-CM

## 2022-12-21 DIAGNOSIS — N18.6 ESRD (END STAGE RENAL DISEASE): ICD-10-CM

## 2022-12-21 DIAGNOSIS — N18.5 CHRONIC KIDNEY DISEASE (CKD), STAGE V: ICD-10-CM

## 2022-12-21 DIAGNOSIS — D69.9 BLEEDING DIATHESIS: ICD-10-CM

## 2022-12-21 DIAGNOSIS — D68.1 FACTOR XI DEFICIENCY: ICD-10-CM

## 2022-12-21 DIAGNOSIS — I50.32 CHRONIC DIASTOLIC HEART FAILURE: ICD-10-CM

## 2022-12-21 DIAGNOSIS — I10 HYPERTENSION, UNSPECIFIED TYPE: ICD-10-CM

## 2022-12-21 DIAGNOSIS — Z86.73 HISTORY OF CVA (CEREBROVASCULAR ACCIDENT) WITHOUT RESIDUAL DEFICITS: Primary | ICD-10-CM

## 2022-12-21 DIAGNOSIS — E11.3553 CONTROLLED TYPE 2 DIABETES MELLITUS WITH STABLE PROLIFERATIVE RETINOPATHY OF BOTH EYES, UNSPECIFIED WHETHER LONG TERM INSULIN USE: ICD-10-CM

## 2022-12-21 LAB
APTT BLDCRRT: 38.7 SEC (ref 21–32)
FIBRINOGEN PPP-MCNC: 447 MG/DL (ref 182–400)
INR PPP: 1.1 (ref 0.8–1.2)
PROTHROMBIN TIME: 11.6 SEC (ref 9–12.5)

## 2022-12-21 PROCEDURE — 3008F BODY MASS INDEX DOCD: CPT | Mod: CPTII,S$GLB,, | Performed by: INTERNAL MEDICINE

## 2022-12-21 PROCEDURE — 85610 PROTHROMBIN TIME: CPT | Mod: NTX | Performed by: INTERNAL MEDICINE

## 2022-12-21 PROCEDURE — 3044F PR MOST RECENT HEMOGLOBIN A1C LEVEL <7.0%: ICD-10-PCS | Mod: CPTII,S$GLB,, | Performed by: INTERNAL MEDICINE

## 2022-12-21 PROCEDURE — 3066F NEPHROPATHY DOC TX: CPT | Mod: CPTII,S$GLB,, | Performed by: INTERNAL MEDICINE

## 2022-12-21 PROCEDURE — 3078F DIAST BP <80 MM HG: CPT | Mod: CPTII,S$GLB,, | Performed by: INTERNAL MEDICINE

## 2022-12-21 PROCEDURE — 99205 OFFICE O/P NEW HI 60 MIN: CPT | Mod: S$GLB,,, | Performed by: INTERNAL MEDICINE

## 2022-12-21 PROCEDURE — 85384 FIBRINOGEN ACTIVITY: CPT | Mod: TXP | Performed by: INTERNAL MEDICINE

## 2022-12-21 PROCEDURE — 3078F PR MOST RECENT DIASTOLIC BLOOD PRESSURE < 80 MM HG: ICD-10-PCS | Mod: CPTII,S$GLB,, | Performed by: INTERNAL MEDICINE

## 2022-12-21 PROCEDURE — 3077F SYST BP >= 140 MM HG: CPT | Mod: CPTII,S$GLB,, | Performed by: INTERNAL MEDICINE

## 2022-12-21 PROCEDURE — 85240 CLOT FACTOR VIII AHG 1 STAGE: CPT | Mod: TXP | Performed by: INTERNAL MEDICINE

## 2022-12-21 PROCEDURE — 3044F HG A1C LEVEL LT 7.0%: CPT | Mod: CPTII,S$GLB,, | Performed by: INTERNAL MEDICINE

## 2022-12-21 PROCEDURE — 3066F PR DOCUMENTATION OF TREATMENT FOR NEPHROPATHY: ICD-10-PCS | Mod: CPTII,S$GLB,, | Performed by: INTERNAL MEDICINE

## 2022-12-21 PROCEDURE — 99999 PR PBB SHADOW E&M-EST. PATIENT-LVL III: ICD-10-PCS | Mod: PBBFAC,,, | Performed by: INTERNAL MEDICINE

## 2022-12-21 PROCEDURE — 85250 CLOT FACTOR IX PTC/CHRSTMAS: CPT | Mod: TXP | Performed by: INTERNAL MEDICINE

## 2022-12-21 PROCEDURE — 85732 THROMBOPLASTIN TIME PARTIAL: CPT | Mod: TXP | Performed by: INTERNAL MEDICINE

## 2022-12-21 PROCEDURE — 85730 THROMBOPLASTIN TIME PARTIAL: CPT | Mod: TXP | Performed by: INTERNAL MEDICINE

## 2022-12-21 PROCEDURE — 99205 PR OFFICE/OUTPT VISIT, NEW, LEVL V, 60-74 MIN: ICD-10-PCS | Mod: S$GLB,,, | Performed by: INTERNAL MEDICINE

## 2022-12-21 PROCEDURE — 36415 COLL VENOUS BLD VENIPUNCTURE: CPT | Mod: NTX | Performed by: INTERNAL MEDICINE

## 2022-12-21 PROCEDURE — 99999 PR PBB SHADOW E&M-EST. PATIENT-LVL III: CPT | Mod: PBBFAC,,, | Performed by: INTERNAL MEDICINE

## 2022-12-21 PROCEDURE — 85270 CLOT FACTOR XI PTA: CPT | Mod: NTX | Performed by: INTERNAL MEDICINE

## 2022-12-21 PROCEDURE — 3008F PR BODY MASS INDEX (BMI) DOCUMENTED: ICD-10-PCS | Mod: CPTII,S$GLB,, | Performed by: INTERNAL MEDICINE

## 2022-12-21 PROCEDURE — 3077F PR MOST RECENT SYSTOLIC BLOOD PRESSURE >= 140 MM HG: ICD-10-PCS | Mod: CPTII,S$GLB,, | Performed by: INTERNAL MEDICINE

## 2022-12-21 PROCEDURE — 85280 CLOT FACTOR XII HAGEMAN: CPT | Mod: NTX | Performed by: INTERNAL MEDICINE

## 2022-12-21 NOTE — PROGRESS NOTES
CC: Hematology consultation for renal transplant clearance, factor XI deficiency    HPI: , 62, is here for hematology consultation. She has DM type II with retinopathy, HTN, CVA, and multiple AKIS. She has facor XI deficinecy.  She had prolonged vaginal bleeding after each of her five deliveries. All deliveries were per vaginum. She required multiple blood transfusions for each of these episodes./ She has history of prolonged bleeding after dental procedures. She had cholecystectomy and thyroidectomy with FFP prophylaxis.  No upper/ lower GI bleeding. No hemarthrosis. No epistaxis/ hemoptysis/hematuria/vaginal bleeding currently. She had h/o menorrhagia.       She believes her father had bleeding problem from factor XI deficiency.        Past Medical History:   Diagnosis Date    Acute respiratory failure with hypoxia and hypercapnia 5/26/2019    ATN (acute tubular necrosis) 2/8/2019    Chronic diastolic heart failure 2/7/2019 2-8-19 Mild left atrial enlargement. Mild left ventricular enlargement. Normal left ventricular systolic function. The estimated ejection fraction is 55% Indeterminate left ventricular diastolic function. Normal right ventricular systolic function. Mild mitral regurgitation. Mild to moderate tricuspid regurgitation. The estimated PA systolic pressure is 32 mm Hg Normal central venous pressure (3 m    CKD (chronic kidney disease) stage 4, GFR 15-29 ml/min 5/16/2018    CKD stage G3a/A3, GFR 45-59 and albumin creatinine ratio >300 mg/g 5/16/2018    CKD stage G3b/A3, GFR 30-44 and albumin creatinine ratio >300 mg/g 5/16/2018    Closed compression fracture of L2 lumbar vertebra 5/24/2019    Closed compression fracture of L2 lumbar vertebra 5/24/2019     IMO Regulatory Load October 2019    Controlled type 2 diabetes with retinopathy 4/9/2018    COVID-19 virus infection 7-1-2021 7/1/2021    CVA (cerebral vascular accident) 2017    Diabetic polyneuropathy associated with type 2  diabetes mellitus 12/4/2020    Essential hypertension 4/9/2018    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  7-31-19 Factor XI Activity 55 - 145 % 96       Gastroesophageal reflux disease without esophagitis 5/24/2019    Generalized anxiety disorder 11/8/2019    GERD (gastroesophageal reflux disease)     GIB (gastrointestinal bleeding) 10/18/2018    History of hepatitis C     s/p succesful Rx w/ [facility]  - SVR12 (cure) 2016    Hypertensive retinopathy, bilateral 12/10/2019    Mild nonproliferative diabetic retinopathy 05/02/2018    Mixed hyperlipidemia 4/9/2018    Myalgia due to statin 9/7/2022    Normocytic anemia 1/14/2022    NS (nuclear sclerosis), bilateral 12/10/2019    PNA (pneumonia) 5/28/2019    Postoperative hypothyroidism     Renovascular hypertension 4/9/2018    S/P kyphoplasty 8/14/2019 8/14/19: L2 kyphoplasty with Dr. Kwon     Slow transit constipation 5/27/2019    Type 2 diabetes mellitus with circulatory disorder, with long-term current use of insulin 4/9/2018    Type 2 diabetes mellitus with stage 3 chronic kidney disease, without long-term current use of insulin 4/9/2018    Type 2 diabetes mellitus with stage 3a chronic kidney disease, without long-term current use of insulin 3/3/2021    Type 2 diabetes mellitus with stage 3b chronic kidney disease, without long-term current use of insulin 9/7/2022         Past Surgical History:   Procedure Laterality Date    AV FISTULA PLACEMENT Left 10/27/2022    Procedure: CREATION, AV FISTULA;  Surgeon: Tirso Tavera MD;  Location: [facility] OR [address] [place] [address];  Service: Peripheral Vascular;  Laterality: Left;  Brachio basilic 1st stage     BACK SURGERY  2017    CHOLECYSTECTOMY  2015    [facility]    COLONOSCOPY  11/13/2015    Dr Chino torres prep. internal hemorrhoids. diverticulosis of sigmois. NO polyps. repeat due 2025    FISTULOGRAM N/A 12/16/2022    Procedure: Fistulogram;  Surgeon: Tirso Tavera MD;  Location: [facility] CATH LAB;  Service:  Peripheral Vascular;  Laterality: N/A;    FIXATION KYPHOPLASTY Bilateral 08/14/2019    Procedure: L2 kyphoplasty Fluoro Globus;  Surgeon: Jeremie Kwon DO;  Location: [facility] OR;  Service: Neurosurgery;  Laterality: Bilateral;  CROW [place]OW [phone] TEXTED HER @ 10:54AM ON 7-30-19  PRE-OP-BY RN  8-7-2019    HYSTERECTOMY      OOPHORECTOMY  1996    one    PERCUTANEOUS TRANSLUMINAL ANGIOPLASTY OF ARTERIOVENOUS FISTULA Left 12/16/2022    Procedure: PTA, AV FISTULA;  Surgeon: Tirso Tavera MD;  Location: [facility] CATH LAB;  Service: Peripheral Vascular;  Laterality: Left;    THYROIDECTOMY  2016    at [facility] for thyroid nodule    TONSILLECTOMY           Social History     Socioeconomic History    Marital status:    Tobacco Use    Smoking status: Never    Smokeless tobacco: Never   Substance and Sexual Activity    Alcohol use: Not Currently    Drug use: No    Sexual activity: Not Currently   Social History Narrative    Pt enjoys cooking and baking and making Flor[employer] and [employer]      Social Determinants of Health     Financial Resource Strain: Low Risk     Difficulty of Paying Living Expenses: Not very hard   Food Insecurity: No Food Insecurity    Worried About Running Out of Food in the Last Year: Never true    Ran Out of Food in the Last Year: Never true   Transportation Needs: No Transportation Needs    Lack of Transportation (Medical): No    Lack of Transportation (Non-Medical): No   Physical Activity: Insufficiently Active    Days of Exercise per Week: 1 day    Minutes of Exercise per Session: 30 min   Stress: No Stress Concern Present    Feeling of Stress : Not at all   Social Connections: Unknown    Frequency of Communication with Friends and Family: More than three times a week    Frequency of Social Gatherings with Friends and Family: More than three times a week    Active Member of Clubs or Organizations: Patient refused    Attends Club or Organization Meetings: Never    Marital Status:     Housing Stability: Low Risk     Unable to Pay for Housing in the Last Year: No    Number of Places Lived in the Last Year: 1    Unstable Housing in the Last Year: No       Review of patient's allergies indicates:   Allergen Reactions    Atorvastatin Other (See Comments)     Statin induced myalgia    Mushroom Hives    Peanut Anaphylaxis    Bactrim [sulfamethoxazole-trimethoprim] Hives    Gabapentin      Pruritis     Iodine     Peanut butter flavor     Penicillins Hives    Shellfish containing products Itching    Sulfa (sulfonamide antibiotics) Hives       Current Outpatient Medications   Medication Sig    amLODIPine (NORVASC) 10 MG tablet Take 1 tablet (10 mg total) by mouth once daily. (Patient taking differently: Take 10 mg by mouth nightly.)    BANOPHEN 50 mg capsule Take 50 mg by mouth As instructed.    carvediloL (COREG) 25 MG tablet Take 1 tablet (25 mg total) by mouth 2 (two) times daily with meals.    cholecalciferol, vitamin D3, 125 mcg (5,000 unit) Tab Take 1 tablet (5,000 Units total) by mouth once daily.    EScitalopram oxalate (LEXAPRO) 10 MG tablet Take 1 tablet (10 mg total) by mouth once daily.    furosemide (LASIX) 40 MG tablet Take 1 tablet (40 mg total) by mouth once daily.    HYDROcodone-acetaminophen (NORCO) 5-325 mg per tablet Take 1 tablet by mouth every 6 (six) hours as needed for Pain.    levocetirizine (XYZAL) 5 MG tablet Take 1 tablet (5 mg total) by mouth daily as needed for Allergies.    ondansetron (ZOFRAN-ODT) 4 MG TbDL Take 1 tablet (4 mg total) by mouth every 8 (eight) hours as needed (nausea).    predniSONE (DELTASONE) 50 MG Tab Take 50 mg by mouth As instructed.    sodium bicarbonate 325 MG tablet Take 2 tablets (650 mg total) by mouth 2 (two) times daily.    SYNTHROID 200 mcg tablet Take 1 tablet (200 mcg total) by mouth before breakfast.    traZODone (DESYREL) 50 MG tablet Take 1 to 2 tablets ( mg total) by mouth nightly as needed for Insomnia.     No current  facility-administered medications for this visit.             Review of Systems   Constitutional:  Positive for malaise/fatigue. Negative for chills, fever and weight loss.   HENT:  Negative for congestion, ear pain, nosebleeds, sinus pain and tinnitus.    Eyes:  Negative for blurred vision, double vision, photophobia, pain and redness.   Respiratory:  Negative for sputum production and stridor.    Cardiovascular:  Negative for chest pain, palpitations, orthopnea and leg swelling.   Gastrointestinal:  Negative for abdominal pain, blood in stool, diarrhea, heartburn and nausea.   Genitourinary:  Negative for dysuria, frequency, hematuria and urgency.   Musculoskeletal:  Negative for myalgias and neck pain.   Neurological:  Negative for dizziness, tingling, sensory change, speech change and headaches.   Endo/Heme/Allergies:  Negative for environmental allergies. Does not bruise/bleed easily.   Psychiatric/Behavioral:  Negative for substance abuse. The patient is not nervous/anxious.       Vitals:    12/21/22 1436   BP: (!) 149/65   Pulse: 75   Resp: 17   Temp: 98.4 °F (36.9 °C)         Physical Exam  Constitutional:       Appearance: She is obese. She is not ill-appearing.   HENT:      Head: Normocephalic and atraumatic.   Eyes:      General: No scleral icterus.  Cardiovascular:      Rate and Rhythm: Normal rate and regular rhythm.   Pulmonary:      Effort: No respiratory distress.      Breath sounds: Normal breath sounds.   Abdominal:      General: There is no distension.      Palpations: There is no mass.      Tenderness: There is no abdominal tenderness.   Musculoskeletal:         General: No swelling.   Lymphadenopathy:      Cervical: No cervical adenopathy.   Neurological:      General: No focal deficit present.      Mental Status: She is alert and oriented to person, place, and time.          Assessment:    1. CKD stage V  2. Renal transplant candidate  3. Type 2 DM not lion long term insulin with renal  complications  4. Factor XI deficiency      Plan:    1,2: She follows with nephrology and transplant services here    3. She is not on any medication at this time for DM    4. She had prolonged vaginal bleeding after each of her five deliveries. All deliveries were per vaginum. She required multiple blood transfusions for each of these episodes. She has history of prolonged bleeding after dental procedures, bu tno specific intervention was required. She had cholecystectomy and thyroidectomy with FFP prophylaxis.  She states that her kyphoplasty and AV fistula surgeries were done without FFP prophylaxis and she did NOT have prolonged bleeding .  No upper/ lower GI bleeding. No hemarthrosis. No epistaxis/ hemoptysis/hematuria/vaginal bleeding currently. She had h/o menorrhagia.      She will have PTT, INR, fibrinogen , factors 8, 9, 11, 12 activities checked, mixing study done today.         BMT Chart Routing      Follow up with physician    Follow up with G[facility]ON 2 weeks.   Provider visit type    Infusion scheduling note    Injection scheduling note    Labs CBC   Lab interval:  ptt, inr, fibrinogen, mixing study, factor 8, factor 9, factor 11 and 12 activity today   Imaging    Pharmacy appointment    Other referrals

## 2022-12-22 LAB
FACT IX ACT/NOR PPP: 118 % (ref 65–145)
FACT VIII ACT/NOR PPP: 103 % (ref 60–170)
FACT XI ACT/NOR PPP: 77 % (ref 55–145)
FACT XII ACT/NOR PPP: 23 % (ref 30–130)
MIXING STUDIES PPP-IMP: NORMAL

## 2023-01-03 ENCOUNTER — TELEPHONE (OUTPATIENT)
Dept: VASCULAR SURGERY | Facility: CLINIC | Age: 63
End: 2023-01-03
Payer: MEDICARE

## 2023-01-03 NOTE — TELEPHONE ENCOUNTER
Spoke with the pt and informed of change in surgery date to 1/31/23.Pt verbalized understanding of information received.

## 2023-01-03 NOTE — PROGRESS NOTES
INTERNAL MEDICINE CLINIC  Follow-up Visit Progress Note     PRESENTING HISTORY     PCP: Elan Price MD    Last Clinic Visit with me:  9-    Current Chief Complaint/Problem: Annual exam.    History of Present Illness & ROS: Ms. Kathie Quezada is a 62 y.o. female.    Review of Systems:  Review of Systems   Constitutional:  Negative for chills and fever.   HENT:  Positive for congestion. Negative for sinus pain and sore throat.    Eyes:  Negative for blurred vision.   Respiratory:  Negative for cough and shortness of breath.    Cardiovascular:  Positive for leg swelling. Negative for chest pain.   Gastrointestinal:  Negative for abdominal pain.   Genitourinary:  Negative for flank pain.   Musculoskeletal:  Negative for joint pain.   Skin:  Negative for rash.   Neurological:  Negative for speech change and headaches.   Psychiatric/Behavioral:  Negative for depression.        PAST HISTORY:     Past Medical History:   Diagnosis Date    Acute respiratory failure with hypoxia and hypercapnia 5/26/2019    ATN (acute tubular necrosis) 2/8/2019    Chronic diastolic heart failure 2/7/2019 2-8-19 Mild left atrial enlargement. Mild left ventricular enlargement. Normal left ventricular systolic function. The estimated ejection fraction is 55% Indeterminate left ventricular diastolic function. Normal right ventricular systolic function. Mild mitral regurgitation. Mild to moderate tricuspid regurgitation. The estimated PA systolic pressure is 32 mm Hg Normal central venous pressure (3 m    CKD (chronic kidney disease) stage 4, GFR 15-29 ml/min 5/16/2018    CKD stage G3a/A3, GFR 45-59 and albumin creatinine ratio >300 mg/g 5/16/2018    CKD stage G3b/A3, GFR 30-44 and albumin creatinine ratio >300 mg/g 5/16/2018    Closed compression fracture of L2 lumbar vertebra 5/24/2019    Closed compression fracture of L2 lumbar vertebra 5/24/2019     IMO Regulatory Load October 2019    Controlled type 2 diabetes with  retinopathy 4/9/2018    COVID-19 virus infection 7-1-2021 7/1/2021    CVA (cerebral vascular accident) 2017    Diabetic polyneuropathy associated with type 2 diabetes mellitus 12/4/2020    Essential hypertension 4/9/2018    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  7-31-19 Factor XI Activity 55 - 145 % 96       Gastroesophageal reflux disease without esophagitis 5/24/2019    Generalized anxiety disorder 11/8/2019    GERD (gastroesophageal reflux disease)     GIB (gastrointestinal bleeding) 10/18/2018    History of CVA (cerebrovascular accident) without residual deficits 11/27/2022    History of hepatitis C     s/p succesful Rx w/ [facility]  - [id number] (cure) 2016    Hypertensive retinopathy, bilateral 12/10/2019    Mild nonproliferative diabetic retinopathy 05/02/2018    Mixed hyperlipidemia 4/9/2018    Myalgia due to statin 9/7/2022    Normocytic anemia 1/14/2022    NS (nuclear sclerosis), bilateral 12/10/2019    PNA (pneumonia) 5/28/2019    Postoperative hypothyroidism     Renovascular hypertension 4/9/2018    S/P kyphoplasty 8/14/2019 8/14/19: L2 kyphoplasty with Dr. Kwon     Slow transit constipation 5/27/2019    Type 2 diabetes mellitus with circulatory disorder, with long-term current use of insulin 4/9/2018    Type 2 diabetes mellitus with stage 3 chronic kidney disease, without long-term current use of insulin 4/9/2018    Type 2 diabetes mellitus with stage 3a chronic kidney disease, without long-term current use of insulin 3/3/2021    Type 2 diabetes mellitus with stage 3b chronic kidney disease, without long-term current use of insulin 9/7/2022       Past Surgical History:   Procedure Laterality Date    AV FISTULA PLACEMENT Left 10/27/2022    Procedure: CREATION, AV FISTULA;  Surgeon: Tirso Tavera MD;  Location: [facility] OR [address] [place] [address];  Service: Peripheral Vascular;  Laterality: Left;  Brachio basilic 1st stage     BACK SURGERY  2017    CHOLECYSTECTOMY  2015    [facility]    COLONOSCOPY   11/13/2015    Dr Chino torres prep. internal hemorrhoids. diverticulosis of sigmois. NO polyps. repeat due 2025    FISTULOGRAM N/A 12/16/2022    Procedure: Fistulogram;  Surgeon: Tirso Tavera MD;  Location: [facility] CATH LAB;  Service: Peripheral Vascular;  Laterality: N/A;    FIXATION KYPHOPLASTY Bilateral 08/14/2019    Procedure: L2 kyphoplasty Fluoro Globus;  Surgeon: Jeremie Kwon DO;  Location: [facility] OR;  Service: Neurosurgery;  Laterality: Bilateral;  GLOBUS MACRINA WINSLOW [phone] TEXTED HER @ 10:54AM ON 7-30-19  PRE-OP-BY RN  8-7-2019    HYSTERECTOMY      OOPHORECTOMY  1996    one    PERCUTANEOUS TRANSLUMINAL ANGIOPLASTY OF ARTERIOVENOUS FISTULA Left 12/16/2022    Procedure: PTA, AV FISTULA;  Surgeon: Tirso Tavera MD;  Location: [facility] CATH LAB;  Service: Peripheral Vascular;  Laterality: Left;    THYROIDECTOMY  2016    at [facility] for thyroid nodule    TONSILLECTOMY         Family History   Problem Relation Age of Onset    Stroke Mother     Heart disease Mother     Hypertension Mother     Diabetes type II Mother     Coronary artery disease Mother     Hypertension Father     Prostate cancer Father     Aneurysm Brother        Social History     Socioeconomic History    Marital status:    Tobacco Use    Smoking status: Never    Smokeless tobacco: Never   Substance and Sexual Activity    Alcohol use: Not Currently    Drug use: No    Sexual activity: Not Currently   Social History Narrative    Pt enjoys cooking and baking and making Floral arrangement and [employer]      Social Determinants of Health     Financial Resource Strain: Low Risk     Difficulty of Paying Living Expenses: Not hard at all   Food Insecurity: No Food Insecurity    Worried About Running Out of Food in the Last Year: Never true    Ran Out of Food in the Last Year: Never true   Transportation Needs: No Transportation Needs    Lack of Transportation (Medical): No    Lack of Transportation (Non-Medical): No   Physical Activity:  Insufficiently Active    Days of Exercise per Week: 1 day    Minutes of Exercise per Session: 30 min   Stress: No Stress Concern Present    Feeling of Stress : Only a little   Social Connections: Unknown    Frequency of Communication with Friends and Family: More than three times a week    Frequency of Social Gatherings with Friends and Family: More than three times a week    Active Member of Clubs or Organizations: Yes    Attends Club or Organization Meetings: 1 to 4 times per year    Marital Status:    Housing Stability: Low Risk     Unable to Pay for Housing in the Last Year: No    Number of Places Lived in the Last Year: 1    Unstable Housing in the Last Year: No       MEDICATIONS & ALLERGIES:     Current Outpatient Medications on File Prior to Visit   Medication Sig Dispense Refill    amLODIPine (NORVASC) 10 MG tablet Take 1 tablet (10 mg total) by mouth once daily. (Patient taking differently: Take 10 mg by mouth nightly.) 90 tablet 3    carvediloL (COREG) 25 MG tablet Take 1 tablet (25 mg total) by mouth 2 (two) times daily with meals. 180 tablet 3    cholecalciferol, vitamin D3, 125 mcg (5,000 unit) Tab Take 1 tablet (5,000 Units total) by mouth once daily. 90 tablet 3    EScitalopram oxalate (LEXAPRO) 10 MG tablet Take 1 tablet (10 mg total) by mouth once daily. 90 tablet 3    furosemide (LASIX) 40 MG tablet Take 1 tablet (40 mg total) by mouth once daily. 90 tablet 3    levocetirizine (XYZAL) 5 MG tablet Take 1 tablet (5 mg total) by mouth daily as needed for Allergies. 90 tablet 3    sodium bicarbonate 325 MG tablet Take 2 tablets (650 mg total) by mouth 2 (two) times daily. 120 tablet 6    SYNTHROID 200 mcg tablet Take 1 tablet (200 mcg total) by mouth before breakfast. 90 tablet 3    traZODone (DESYREL) 50 MG tablet Take 1 to 2 tablets ( mg total) by mouth nightly as needed for Insomnia. 120 tablet 3     Review of patient's allergies indicates:   Allergen Reactions    Atorvastatin Other  (See Comments)     Statin induced myalgia    Mushroom Hives    Peanut Anaphylaxis    Bactrim [sulfamethoxazole-trimethoprim] Hives    Gabapentin      Pruritis     Iodine     Peanut butter flavor     Penicillins Hives    Shellfish containing products Itching    Sulfa (sulfonamide antibiotics) Hives       Medications Reconciliation:   I have reconciled the patient's home medications and discharge medications with the patient/family. I have updated all changes.  Refer to After-Visit Medication List.    OBJECTIVE:     Vital Signs:  Vitals:    01/04/23 1051   BP: 138/88   Pulse: 74     Wt Readings from Last 3 Encounters:   01/04/23 1051 121 kg (266 lb 12.1 oz)   01/04/23 0846 120.8 kg (266 lb 6.8 oz)   12/21/22 1436 119.3 kg (263 lb 0.1 oz)     Body mass index is 40.67 kg/m².   Wt Readings from Last 3 Encounters:   09/19/22 0919 120.2 kg (264 lb 15.9 oz)   09/12/22 0732 117.9 kg (260 lb)   09/12/22 0230 117.9 kg (260 lb)   09/07/22 1005 119.3 kg (263 lb 0.1 oz)     Physical Exam:  General: Well developed, well nourished. No distress.  HEENT: Head is normocephalic, atraumatic   Eyes: Clear conjunctiva.  Neck: Supple, symmetrical neck; trachea midline.  Lungs: Clear to auscultation bilaterally and normal respiratory effort.  Cardiovascular: Heart with regular rate and rhythm.    Extremities: Bilateral 1 + pedal edema.  Abdomen: Abdomen is soft, non-tender non-distended with normal bowel sounds.  Skin: Skin color, texture, turgor normal. No rashes.  Musculoskeletal: Normal gait.   Psychiatric: Normal affect. Alert.    Laboratory  Lab Results   Component Value Date    WBC 12.63 12/19/2022    HGB 9.2 (L) 12/19/2022    HCT 29.3 (L) 12/19/2022     12/19/2022    CHOL 179 11/17/2022    TRIG 102 11/17/2022    HDL 53 11/17/2022    ALT 9 (L) 11/17/2022    AST 11 11/17/2022     12/19/2022    K 4.1 12/19/2022     12/19/2022    CREATININE 3.8 (H) 12/19/2022    BUN 39 (H) 12/19/2022    CO2 22 (L) 12/19/2022    TSH  43.212 (H) 09/11/2022    INR 1.1 12/21/2022    HGBA1C 5.5 11/17/2022       ASSESSMENT & PLAN:     Annual physical exam  - Reviewed and updated past and current medical problems.  Discussed treatment of current medical problems        Undergoing preparation for hemodialysis now.    Chronic diastolic heart failure  Renovascular hypertension  - Compensated CHF.    Could not get DIgital HTN to work for her.          US 11-19-21  Preserved right and left cortical thickness, echogenicity, and corticomedullary differentiation.  No right or left renal masses, stones, or hydronephrosis.  Right renal resistive index 0.83.  Left renal resistive index 0.82.  Right kidney measures 10.7 cm.  Left kidney measures 10.9 cm.  Given level of bladder distention with urine, no intraluminal masses. Unremarkable renal ultrasound.       CHF/HTN Regimen:   -     amLODIPine (NORVASC) 10 MG tablet; Take 1 tablet (10 mg total) by mouth once daily.    -     carvediloL (COREG) 25 MG tablet; Take 1 tablet (25 mg total) by mouth 2 (two) times daily   -     furosemide (LASIX) 40 MG tablet; Take 1 tablet (40 mg total) by mouth once daily.         Increase in  LE edema (pre HD), plan: increase Lasix to BID.  -     furosemide (LASIX) 40 MG tablet; Take 1 tablet (40 mg total) by mouth 2 (two) times a day.  Dispense: 180 tablet; Refill: 3    CKD stage 5  Nephrotic range proteinuria  Anemia in CKD 5  - Sodium bicarbonate 325 mg BID    Type 2 diabetes mellitus with stage 5 chronic kidney disease, without long-term current use of insulin  Diabetic Neuropathy and retinopathy  Diabetic neuropathy  - Diet controlled. A1c 5.5     Mixed hyperlipidemia  Statin Induced myopathy.  - LDL goal < 100    Last LDL 88 ()     - Statin induced myopathy with atorvastatin.     Vitamin D deficiency  - Change from weekly to daily Vitamin D3 5000 IU daily OTC.   -     cholecalciferol, vitamin D3, 125 mcg (5,000 unit) Tab; Take 1 tablet (5,000 Units total) by mouth once  daily.     Postoperative hypothyroidism  -     SYNTHROID 200 mcg tablet; Take 1 tablet (200 mcg total) by mouth before breakfast.      High TSH due to patient not taking med.        Dicussed with the patient.     Generalized anxiety disorder  -     EScitalopram oxalate (LEXAPRO) 10 MG tablet; Take 1 tablet (10 mg total) by mouth once daily.   -     traZODone (DESYREL) 50 MG tablet; Take 1 tablet (50 mg total) by mouth nightly as needed for Insomnia.        Factor XI deficiency  - Usually get  FFP 2 units right before surgery for Factor XI deficiency     Class 2 severe obesity due to excess calories with serious comorbidity and body mass index (BMI) of 35.0 to 35.9 in adult  Body mass index is 36.31 kg/m².    Chronic allergic rhinitis  -     montelukast (SINGULAIR) 5 MG chewable tablet; Take 1 tablet (5 mg total) by mouth every evening.  Dispense: 30 tablet; Refill: 0  -     levocetirizine (XYZAL) 5 MG tablet; Take 1 tablet (5 mg total) by mouth daily as needed for Allergies.  Dispense: 90 tablet; Refill: 3    Preventive Health Maintenance:    Eye exam I- patient to schedule.     Return to Clinic for Follow Up with me:   6 months. Patient to schedule since she will be on HD then.    Scheduled Follow-up :  Future Appointments   Date Time Provider Department Center   1/12/2023 11:00 AM Fly Vyas Jr., MD [id number]   1/27/2023  8:00 AM Jennifer B. Scheuermann, PA [facility] HEPC [facility]   1/27/2023  9:30 AM Fred Kelly MD [facility] NEPHRO [facility]   3/16/2023  9:00 AM Victorino Luna MD [facility] GASTRO [facility]       After Visit Medication List :     Medication List            Accurate as of January 4, 2023 11:15 AM. If you have any questions, ask your nurse or doctor.                START taking these medications      montelukast 5 MG chewable tablet  Commonly known as: SINGULAIR  Take 1 tablet (5 mg total) by mouth every evening.  Started by: Elan Price MD            CHANGE how you take these  medications      furosemide 40 MG tablet  Commonly known as: LASIX  Take 1 tablet (40 mg total) by mouth 2 (two) times a day.  What changed: when to take this  Changed by: Elan Price MD            CONTINUE taking these medications      amLODIPine 10 MG tablet  Commonly known as: NORVASC  Take 1 tablet (10 mg total) by mouth nightly.     carvediloL 25 MG tablet  Commonly known as: COREG  Take 1 tablet (25 mg total) by mouth 2 (two) times daily with meals.     cholecalciferol (vitamin D3) 125 mcg (5,000 unit) Tab  Take 1 tablet (5,000 Units total) by mouth once daily.     EScitalopram oxalate 10 MG tablet  Commonly known as: LEXAPRO  Take 1 tablet (10 mg total) by mouth once daily.     levocetirizine 5 MG tablet  Commonly known as: XYZAL  Take 1 tablet (5 mg total) by mouth daily as needed for Allergies.     sodium bicarbonate 325 MG tablet  Take 2 tablets (650 mg total) by mouth 2 (two) times daily.     SYNTHROID 200 MCG tablet  Generic drug: levothyroxine  Take 1 tablet (200 mcg total) by mouth before breakfast.     traZODone 50 MG tablet  Commonly known as: DESYREL  Take 1 to 2 tablets ( mg total) by mouth nightly as needed for Insomnia.            STOP taking these medications      BANOPHEN 50 MG capsule  Generic drug: diphenhydrAMINE  Stopped by: Elan Price MD     HYDROcodone-acetaminophen 5-325 mg per tablet  Commonly known as: NORCO  Stopped by: Elan Price MD     ondansetron 4 MG Tbdl  Commonly known as: ZOFRAN-ODT  Stopped by: Elan Price MD     predniSONE 50 MG Tab  Commonly known as: DELTASONE  Stopped by: Elan Price MD               Where to Get Your Medications        These medications were sent to [facility]  [address] [place] [id number]      Hours: Mon-Fri, 8a-5:30p Phone: [phone]   amLODIPine 10 MG tablet  carvediloL 25 MG tablet  cholecalciferol (vitamin D3) 125 mcg (5,000 unit) Tab  EScitalopram oxalate 10 MG tablet  furosemide 40 MG  tablet  levocetirizine 5 MG tablet  montelukast 5 MG chewable tablet  SYNTHROID 200 MCG tablet  traZODone 50 MG tablet         Signing Physician:  Elan Price MD

## 2023-01-04 ENCOUNTER — OFFICE VISIT (OUTPATIENT)
Dept: HEMATOLOGY/ONCOLOGY | Facility: CLINIC | Age: 63
End: 2023-01-04
Payer: MEDICARE

## 2023-01-04 ENCOUNTER — OFFICE VISIT (OUTPATIENT)
Dept: INTERNAL MEDICINE | Facility: CLINIC | Age: 63
End: 2023-01-04
Payer: MEDICARE

## 2023-01-04 VITALS
SYSTOLIC BLOOD PRESSURE: 180 MMHG | WEIGHT: 266.44 LBS | BODY MASS INDEX: 40.38 KG/M2 | OXYGEN SATURATION: 97 % | HEART RATE: 76 BPM | DIASTOLIC BLOOD PRESSURE: 79 MMHG | RESPIRATION RATE: 20 BRPM | HEIGHT: 68 IN | TEMPERATURE: 98 F

## 2023-01-04 VITALS
WEIGHT: 266.75 LBS | BODY MASS INDEX: 40.43 KG/M2 | OXYGEN SATURATION: 96 % | HEART RATE: 74 BPM | SYSTOLIC BLOOD PRESSURE: 138 MMHG | HEIGHT: 68 IN | DIASTOLIC BLOOD PRESSURE: 88 MMHG

## 2023-01-04 DIAGNOSIS — J30.9 CHRONIC ALLERGIC RHINITIS: ICD-10-CM

## 2023-01-04 DIAGNOSIS — E11.42 DIABETIC POLYNEUROPATHY ASSOCIATED WITH TYPE 2 DIABETES MELLITUS: ICD-10-CM

## 2023-01-04 DIAGNOSIS — D68.2 FACTOR XII DEFICIENCY: Primary | ICD-10-CM

## 2023-01-04 DIAGNOSIS — N18.5 CHRONIC KIDNEY DISEASE (CKD), STAGE V: ICD-10-CM

## 2023-01-04 DIAGNOSIS — I50.32 CHRONIC DIASTOLIC HEART FAILURE: ICD-10-CM

## 2023-01-04 DIAGNOSIS — E78.2 MIXED HYPERLIPIDEMIA: ICD-10-CM

## 2023-01-04 DIAGNOSIS — F41.1 GENERALIZED ANXIETY DISORDER: ICD-10-CM

## 2023-01-04 DIAGNOSIS — E55.9 VITAMIN D DEFICIENCY: ICD-10-CM

## 2023-01-04 DIAGNOSIS — D64.9 NORMOCYTIC ANEMIA: ICD-10-CM

## 2023-01-04 DIAGNOSIS — T46.6X5A MYALGIA DUE TO STATIN: ICD-10-CM

## 2023-01-04 DIAGNOSIS — N18.5 TYPE 2 DIABETES MELLITUS WITH STAGE 5 CHRONIC KIDNEY DISEASE NOT ON CHRONIC DIALYSIS, WITHOUT LONG-TERM CURRENT USE OF INSULIN: ICD-10-CM

## 2023-01-04 DIAGNOSIS — Z76.82 ORGAN TRANSPLANT CANDIDATE: ICD-10-CM

## 2023-01-04 DIAGNOSIS — Z00.00 ANNUAL PHYSICAL EXAM: Primary | ICD-10-CM

## 2023-01-04 DIAGNOSIS — R80.9 NEPHROTIC RANGE PROTEINURIA: ICD-10-CM

## 2023-01-04 DIAGNOSIS — E66.01 CLASS 2 SEVERE OBESITY DUE TO EXCESS CALORIES WITH SERIOUS COMORBIDITY AND BODY MASS INDEX (BMI) OF 35.0 TO 35.9 IN ADULT: ICD-10-CM

## 2023-01-04 DIAGNOSIS — E11.22 TYPE 2 DIABETES MELLITUS WITH STAGE 5 CHRONIC KIDNEY DISEASE NOT ON CHRONIC DIALYSIS, WITHOUT LONG-TERM CURRENT USE OF INSULIN: ICD-10-CM

## 2023-01-04 DIAGNOSIS — D68.1 FACTOR XI DEFICIENCY: ICD-10-CM

## 2023-01-04 DIAGNOSIS — E89.0 POSTOPERATIVE HYPOTHYROIDISM: ICD-10-CM

## 2023-01-04 DIAGNOSIS — E11.3553 CONTROLLED TYPE 2 DIABETES MELLITUS WITH STABLE PROLIFERATIVE RETINOPATHY OF BOTH EYES, UNSPECIFIED WHETHER LONG TERM INSULIN USE: ICD-10-CM

## 2023-01-04 DIAGNOSIS — I15.0 RENOVASCULAR HYPERTENSION: ICD-10-CM

## 2023-01-04 DIAGNOSIS — M79.10 MYALGIA DUE TO STATIN: ICD-10-CM

## 2023-01-04 PROBLEM — Z86.73 HISTORY OF CVA (CEREBROVASCULAR ACCIDENT) WITHOUT RESIDUAL DEFICITS: Status: RESOLVED | Noted: 2022-11-27 | Resolved: 2023-01-04

## 2023-01-04 PROBLEM — Z86.16 HISTORY OF COVID-19: Status: RESOLVED | Noted: 2021-07-01 | Resolved: 2023-01-04

## 2023-01-04 PROBLEM — N18.6 ANEMIA IN END-STAGE RENAL DISEASE: Status: ACTIVE | Noted: 2022-01-14

## 2023-01-04 PROBLEM — D63.1 ANEMIA IN END-STAGE RENAL DISEASE: Status: ACTIVE | Noted: 2022-01-14

## 2023-01-04 PROCEDURE — 3008F PR BODY MASS INDEX (BMI) DOCUMENTED: ICD-10-PCS | Mod: HCNC,CPTII,S$GLB, | Performed by: INTERNAL MEDICINE

## 2023-01-04 PROCEDURE — 3008F BODY MASS INDEX DOCD: CPT | Mod: HCNC,CPTII,S$GLB, | Performed by: INTERNAL MEDICINE

## 2023-01-04 PROCEDURE — 1159F MED LIST DOCD IN RCRD: CPT | Mod: HCNC,CPTII,S$GLB, | Performed by: INTERNAL MEDICINE

## 2023-01-04 PROCEDURE — 99499 UNLISTED E&M SERVICE: CPT | Mod: HCNC,S$GLB,, | Performed by: INTERNAL MEDICINE

## 2023-01-04 PROCEDURE — 1159F PR MEDICATION LIST DOCUMENTED IN MEDICAL RECORD: ICD-10-PCS | Mod: HCNC,CPTII,S$GLB, | Performed by: INTERNAL MEDICINE

## 2023-01-04 PROCEDURE — 3008F BODY MASS INDEX DOCD: CPT | Mod: CPTII,S$GLB,TXP, | Performed by: PHYSICIAN ASSISTANT

## 2023-01-04 PROCEDURE — 99499 RISK ADDL DX/OHS AUDIT: ICD-10-PCS | Mod: HCNC,S$GLB,, | Performed by: INTERNAL MEDICINE

## 2023-01-04 PROCEDURE — 3077F SYST BP >= 140 MM HG: CPT | Mod: CPTII,S$GLB,TXP, | Performed by: PHYSICIAN ASSISTANT

## 2023-01-04 PROCEDURE — 3078F PR MOST RECENT DIASTOLIC BLOOD PRESSURE < 80 MM HG: ICD-10-PCS | Mod: CPTII,S$GLB,TXP, | Performed by: PHYSICIAN ASSISTANT

## 2023-01-04 PROCEDURE — 3075F PR MOST RECENT SYSTOLIC BLOOD PRESS GE 130-139MM HG: ICD-10-PCS | Mod: HCNC,CPTII,S$GLB, | Performed by: INTERNAL MEDICINE

## 2023-01-04 PROCEDURE — 3077F PR MOST RECENT SYSTOLIC BLOOD PRESSURE >= 140 MM HG: ICD-10-PCS | Mod: CPTII,S$GLB,TXP, | Performed by: PHYSICIAN ASSISTANT

## 2023-01-04 PROCEDURE — 99999 PR PBB SHADOW E&M-EST. PATIENT-LVL III: CPT | Mod: PBBFAC,HCNC,, | Performed by: INTERNAL MEDICINE

## 2023-01-04 PROCEDURE — 99999 PR PBB SHADOW E&M-EST. PATIENT-LVL V: ICD-10-PCS | Mod: PBBFAC,HCNC,TXP, | Performed by: PHYSICIAN ASSISTANT

## 2023-01-04 PROCEDURE — 3079F PR MOST RECENT DIASTOLIC BLOOD PRESSURE 80-89 MM HG: ICD-10-PCS | Mod: HCNC,CPTII,S$GLB, | Performed by: INTERNAL MEDICINE

## 2023-01-04 PROCEDURE — 3008F PR BODY MASS INDEX (BMI) DOCUMENTED: ICD-10-PCS | Mod: CPTII,S$GLB,TXP, | Performed by: PHYSICIAN ASSISTANT

## 2023-01-04 PROCEDURE — 3079F DIAST BP 80-89 MM HG: CPT | Mod: HCNC,CPTII,S$GLB, | Performed by: INTERNAL MEDICINE

## 2023-01-04 PROCEDURE — 99999 PR PBB SHADOW E&M-EST. PATIENT-LVL V: CPT | Mod: PBBFAC,HCNC,TXP, | Performed by: PHYSICIAN ASSISTANT

## 2023-01-04 PROCEDURE — 3075F SYST BP GE 130 - 139MM HG: CPT | Mod: HCNC,CPTII,S$GLB, | Performed by: INTERNAL MEDICINE

## 2023-01-04 PROCEDURE — 99999 PR PBB SHADOW E&M-EST. PATIENT-LVL III: ICD-10-PCS | Mod: PBBFAC,HCNC,, | Performed by: INTERNAL MEDICINE

## 2023-01-04 PROCEDURE — 99214 OFFICE O/P EST MOD 30 MIN: CPT | Mod: S$GLB,TXP,, | Performed by: PHYSICIAN ASSISTANT

## 2023-01-04 PROCEDURE — 99214 OFFICE O/P EST MOD 30 MIN: CPT | Mod: HCNC,S$GLB,, | Performed by: INTERNAL MEDICINE

## 2023-01-04 PROCEDURE — 99214 PR OFFICE/OUTPT VISIT, EST, LEVL IV, 30-39 MIN: ICD-10-PCS | Mod: HCNC,S$GLB,, | Performed by: INTERNAL MEDICINE

## 2023-01-04 PROCEDURE — 3078F DIAST BP <80 MM HG: CPT | Mod: CPTII,S$GLB,TXP, | Performed by: PHYSICIAN ASSISTANT

## 2023-01-04 PROCEDURE — 99214 PR OFFICE/OUTPT VISIT, EST, LEVL IV, 30-39 MIN: ICD-10-PCS | Mod: S$GLB,TXP,, | Performed by: PHYSICIAN ASSISTANT

## 2023-01-04 RX ORDER — CARVEDILOL 25 MG/1
25 TABLET ORAL 2 TIMES DAILY WITH MEALS
Qty: 180 TABLET | Refills: 3 | Status: ON HOLD | OUTPATIENT
Start: 2023-01-04 | End: 2023-02-10 | Stop reason: SDUPTHER

## 2023-01-04 RX ORDER — ESCITALOPRAM OXALATE 10 MG/1
10 TABLET ORAL DAILY
Qty: 90 TABLET | Refills: 3 | Status: SHIPPED | OUTPATIENT
Start: 2023-01-04 | End: 2024-02-02 | Stop reason: SDUPTHER

## 2023-01-04 RX ORDER — FUROSEMIDE 40 MG/1
40 TABLET ORAL 2 TIMES DAILY
Qty: 180 TABLET | Refills: 3 | Status: ON HOLD | OUTPATIENT
Start: 2023-01-04 | End: 2023-02-08 | Stop reason: SDUPTHER

## 2023-01-04 RX ORDER — MONTELUKAST SODIUM 5 MG/1
5 TABLET, CHEWABLE ORAL NIGHTLY
Qty: 30 TABLET | Refills: 0 | Status: ON HOLD | OUTPATIENT
Start: 2023-01-04 | End: 2023-02-10

## 2023-01-04 RX ORDER — LEVOTHYROXINE SODIUM 200 UG/1
200 TABLET ORAL
Qty: 90 TABLET | Refills: 3 | Status: SHIPPED | OUTPATIENT
Start: 2023-01-04 | End: 2024-02-02 | Stop reason: SDUPTHER

## 2023-01-04 RX ORDER — LEVOCETIRIZINE DIHYDROCHLORIDE 5 MG/1
5 TABLET, FILM COATED ORAL DAILY PRN
Qty: 90 TABLET | Refills: 3 | Status: SHIPPED | OUTPATIENT
Start: 2023-01-04 | End: 2024-02-02 | Stop reason: SDUPTHER

## 2023-01-04 RX ORDER — AMLODIPINE BESYLATE 10 MG/1
10 TABLET ORAL NIGHTLY
Qty: 90 TABLET | Refills: 3 | Status: ON HOLD | OUTPATIENT
Start: 2023-01-04 | End: 2023-02-08 | Stop reason: HOSPADM

## 2023-01-04 RX ORDER — ACETAMINOPHEN 500 MG
5000 TABLET ORAL DAILY
Qty: 90 TABLET | Refills: 3 | Status: SHIPPED | OUTPATIENT
Start: 2023-01-04 | End: 2024-01-29 | Stop reason: SDUPTHER

## 2023-01-04 RX ORDER — TRAZODONE HYDROCHLORIDE 50 MG/1
50-100 TABLET ORAL NIGHTLY PRN
Qty: 120 TABLET | Refills: 3 | Status: SHIPPED | OUTPATIENT
Start: 2023-01-04 | End: 2024-02-02 | Stop reason: SDUPTHER

## 2023-01-04 NOTE — PROGRESS NOTES
Section of Hematology and Stem Cell Transplantation  Follow Up Note     Visit Date: 01/04/2023    Primary Hematologic Diagnosis: Factor XII Deficiency    History of Present Ilness: (from initial HPI w/ Dr. Nguyen 12/21/2022)   , 62, is here for hematology consultation. She has DM type II with retinopathy, HTN, CVA, and multiple AKIS. She has facor XI deficinecy.  She had prolonged vaginal bleeding after each of her five deliveries. All deliveries were per vaginum. She required multiple blood transfusions for each of these episodes./ She has history of prolonged bleeding after dental procedures. She had cholecystectomy and thyroidectomy with FFP prophylaxis.  No upper/ lower GI bleeding. No hemarthrosis. No epistaxis/ hemoptysis/hematuria/vaginal bleeding currently. She had h/o menorrhagia.       She believes her father had bleeding problem from factor XI deficiency.        Interval History:   Ms. Quezada presents for hematologic follow up after first being evaluated for Factor XII deficiency as she is undergoing evaluation for renal transplant. In the interim, the patient is discussing dialysis with nephrology.     We reviewed her mildly-low Factor XII levels and that this is not typically associated with a bleeding diathesis that requires prophylaxis. However, we also reviwed her pertinent history of bleeding complications surrounding child birth and previous successful surgeries utilizing FFP prophylaxis. We discussed we would make recommendations to the transplant team to utilize FFP prior to any major operations.     She generally has been feeling well, without new complaints today following her consult with Dr. Nguyen last month. No new bleeding events. She has noticed increased lower extremity edema bilaterally, feels volume overloaded - she did not take her lasix this AM. She is f/u with PCP after this visit and will discuss. No CP/SOB. Elevated BP during visit, recheck . She is asymptomatic  in clinic. Again we discussed her compliance with her lasix/medications, to address w/ PCP today.         Past Medical History, Social History, and Past Family History are unchanged since last evaluation except for HPI.     CURRENT MEDICATIONS:   Current Outpatient Medications   Medication Sig    amLODIPine (NORVASC) 10 MG tablet Take 1 tablet (10 mg total) by mouth once daily. (Patient taking differently: Take 10 mg by mouth nightly.)    carvediloL (COREG) 25 MG tablet Take 1 tablet (25 mg total) by mouth 2 (two) times daily with meals.    cholecalciferol, vitamin D3, 125 mcg (5,000 unit) Tab Take 1 tablet (5,000 Units total) by mouth once daily.    EScitalopram oxalate (LEXAPRO) 10 MG tablet Take 1 tablet (10 mg total) by mouth once daily.    furosemide (LASIX) 40 MG tablet Take 1 tablet (40 mg total) by mouth once daily.    levocetirizine (XYZAL) 5 MG tablet Take 1 tablet (5 mg total) by mouth daily as needed for Allergies.    sodium bicarbonate 325 MG tablet Take 2 tablets (650 mg total) by mouth 2 (two) times daily.    SYNTHROID 200 mcg tablet Take 1 tablet (200 mcg total) by mouth before breakfast.    traZODone (DESYREL) 50 MG tablet Take 1 to 2 tablets ( mg total) by mouth nightly as needed for Insomnia.    BANOPHEN 50 mg capsule Take 50 mg by mouth As instructed.    HYDROcodone-acetaminophen (NORCO) 5-325 mg per tablet Take 1 tablet by mouth every 6 (six) hours as needed for Pain.    ondansetron (ZOFRAN-ODT) 4 MG TbDL Take 1 tablet (4 mg total) by mouth every 8 (eight) hours as needed (nausea). (Patient not taking: Reported on 1/4/2023)    predniSONE (DELTASONE) 50 MG Tab Take 50 mg by mouth As instructed.     No current facility-administered medications for this visit.       ALLERGIES:   Review of patient's allergies indicates:   Allergen Reactions    Atorvastatin Other (See Comments)     Statin induced myalgia    Mushroom Hives    Peanut Anaphylaxis    Bactrim [sulfamethoxazole-trimethoprim] Hives     Gabapentin      Pruritis     Iodine     Peanut butter flavor     Penicillins Hives    Shellfish containing products Itching    Sulfa (sulfonamide antibiotics) Hives         Review of Systems:     Review of Systems   Constitutional: Negative.    HENT: Negative.     Respiratory:  Negative for cough and shortness of breath.    Cardiovascular:  Negative for chest pain.   Gastrointestinal:  Negative for abdominal pain and diarrhea.   Genitourinary:  Negative for frequency.   Musculoskeletal:  Negative for back pain.   Skin:  Negative for rash.   Neurological:  Positive for headaches.   Endo/Heme/Allergies:  Bruises/bleeds easily.   Psychiatric/Behavioral:  The patient is not nervous/anxious.      Physical Exam:     Vitals:    01/04/23 0846   BP: (!) 180/79   Pulse: 76   Resp: 20   Temp: 98.4 °F (36.9 °C)       Physical Exam  Constitutional:       General: She is not in acute distress.     Appearance: She is obese.   HENT:      Head: Normocephalic.      Right Ear: External ear normal.      Left Ear: External ear normal.      Nose: Nose normal.      Mouth/Throat:      Mouth: Mucous membranes are moist.      Pharynx: Oropharynx is clear.   Eyes:      Pupils: Pupils are equal, round, and reactive to light.   Cardiovascular:      Rate and Rhythm: Normal rate and regular rhythm.      Pulses: Normal pulses.   Pulmonary:      Effort: Pulmonary effort is normal.   Abdominal:      General: There is no distension.      Palpations: Abdomen is soft.   Musculoskeletal:         General: Swelling present. Normal range of motion.      Cervical back: Neck supple.      Right lower leg: Edema present.      Left lower leg: Edema present.   Lymphadenopathy:      Cervical: No cervical adenopathy.   Skin:     General: Skin is warm.   Neurological:      General: No focal deficit present.      Mental Status: She is alert.      Cranial Nerves: No cranial nerve deficit.         Labs:   Lab Results   Component Value Date    WBC 12.63 12/19/2022     HGB 9.2 (L) 12/19/2022    HCT 29.3 (L) 12/19/2022    MCV 82 12/19/2022     12/19/2022       Lab Results   Component Value Date     12/19/2022    K 4.1 12/19/2022     12/19/2022    CO2 22 (L) 12/19/2022    BUN 39 (H) 12/19/2022    CREATININE 3.8 (H) 12/19/2022    ALBUMIN 3.3 (L) 12/19/2022    BILITOT 0.5 11/17/2022    ALKPHOS 93 11/17/2022    AST 11 11/17/2022    ALT 9 (L) 11/17/2022            Assessment and Plan:   Kathie Quezada (Kathie) is a pleasant 62 y.o.female with Factor XII Deficiency.    Factor XII Deficiency   - Pt reports she was diagnosed with Factor XII deficiency in the 1980s  - Has had bleeding following various hemostatic challenges including vaginal deliveries, dental procedures. Received prophylactic FFP for cholecystectomy and thyroidectomy w/o complication.  - FXII level is 23%, mild and would not typically correlate with bleeding diathesis. However, patient with known hx of bleeding complications.   - We will obtain VWD panel and Factor XIII levels to be cautious.  - Recommend checking PTT and Factor XII level prior to any planned procedures and routinely in the post-operative setting. Recommend 2u FFP prior to any major operations such as renal transplant,  and 1-2u FFP POD #1 from major operations to prevent bleeding complications. Given history of heart failure, be cautious of volume overload with product support.  Consider inpatient hematology consult post-operatively as needed for any bleeding complications.     Stage 3B CKD  Renal Transplant Candidate  - Pt with Stage 3B CKD in setting of T2DM with recurrent ARUNA and worsening CKD over recent years  - Has had fistula placed and nephrology team anticipating HD in near future, undergoing renal transplant evaluation    Chronic Diastolic HF  - Volume overloaded today with bilat lower ext edema on exam, with chronic IBRAHIM  - Did not take lasix, encourage lasix upon returning home, has PCP f/u after this appt for  potential dose adjustments      Orders Placed:          Orders Placed This Encounter   Procedures    von Willebrand Profile     Standing Status:   Future     Standing Expiration Date:   3/5/2024    VON WILLEBRAND ANTIGEN     Standing Status:   Future     Standing Expiration Date:   3/5/2024    APTT     Standing Status:   Future     Standing Expiration Date:   3/5/2024    Factor 13 activity     Standing Status:   Future     Standing Expiration Date:   3/5/2024         Follow Up:      No specific f/u needed at present. Will follow up on pending labs and arrange f/u as necessary as needed at that time. Happy to see patient again in future if additional recommendations needed preoperatively. Discussed with Dr. Nguyen.       BMT Chart Routing      Follow up with physician    Follow up with GORDON    Provider visit type    Infusion scheduling note    Injection scheduling note    Labs   Lab interval:  Vonwillebrand, factor XIII, PTT --> schedule labs on 1/27 AM (after her other appts at [facility])   Imaging    Pharmacy appointment    Other referrals           Thank you for allowing me to partake in the care of this patient.       Purvi Tellez PA-C  Hematology, Oncology, and Stem Cell Transplantation  [facility]

## 2023-01-12 ENCOUNTER — OFFICE VISIT (OUTPATIENT)
Dept: OBSTETRICS AND GYNECOLOGY | Facility: CLINIC | Age: 63
End: 2023-01-12
Payer: MEDICARE

## 2023-01-12 VITALS — HEIGHT: 67 IN | BODY MASS INDEX: 41.52 KG/M2 | WEIGHT: 264.56 LBS

## 2023-01-12 DIAGNOSIS — Z01.419 ROUTINE GYNECOLOGICAL EXAMINATION: ICD-10-CM

## 2023-01-12 PROCEDURE — 1159F MED LIST DOCD IN RCRD: CPT | Mod: HCNC,CPTII,S$GLB, | Performed by: OBSTETRICS & GYNECOLOGY

## 2023-01-12 PROCEDURE — 1159F PR MEDICATION LIST DOCUMENTED IN MEDICAL RECORD: ICD-10-PCS | Mod: HCNC,CPTII,S$GLB, | Performed by: OBSTETRICS & GYNECOLOGY

## 2023-01-12 PROCEDURE — G0101 CA SCREEN;PELVIC/BREAST EXAM: HCPCS | Mod: HCNC,S$GLB,, | Performed by: OBSTETRICS & GYNECOLOGY

## 2023-01-12 PROCEDURE — G0101 PR CA SCREEN;PELVIC/BREAST EXAM: ICD-10-PCS | Mod: HCNC,S$GLB,, | Performed by: OBSTETRICS & GYNECOLOGY

## 2023-01-12 PROCEDURE — 99999 PR PBB SHADOW E&M-EST. PATIENT-LVL III: CPT | Mod: PBBFAC,HCNC,, | Performed by: OBSTETRICS & GYNECOLOGY

## 2023-01-12 PROCEDURE — 99999 PR PBB SHADOW E&M-EST. PATIENT-LVL III: ICD-10-PCS | Mod: PBBFAC,HCNC,, | Performed by: OBSTETRICS & GYNECOLOGY

## 2023-01-12 PROCEDURE — 88175 CYTOPATH C/V AUTO FLUID REDO: CPT | Mod: HCNC | Performed by: OBSTETRICS & GYNECOLOGY

## 2023-01-12 PROCEDURE — 3008F BODY MASS INDEX DOCD: CPT | Mod: HCNC,CPTII,S$GLB, | Performed by: OBSTETRICS & GYNECOLOGY

## 2023-01-12 PROCEDURE — 3008F PR BODY MASS INDEX (BMI) DOCUMENTED: ICD-10-PCS | Mod: HCNC,CPTII,S$GLB, | Performed by: OBSTETRICS & GYNECOLOGY

## 2023-01-12 NOTE — PROGRESS NOTES
PT HERE ON TRANSPLANT LIST.    ROS:  GENERAL: No fever, chills, fatigability or weight loss.  VULVAR: No pain, no lesions and no itching.  VAGINAL: No relaxation, no itching, no discharge, no abnormal bleeding and no lesions.  ABDOMEN: No abdominal pain. Denies nausea. Denies vomiting. No diarrhea. No constipation  BREAST: Denies pain. No lumps. No discharge.  URINARY: No incontinence, no nocturia, no frequency and no dysuria.  CARDIOVASCULAR: No chest pain. No shortness of breath. No leg cramps.  NEUROLOGICAL: No headaches. No vision changes.  The remainder of the review of systems was negative.    PE:   General Appearance: obese Well developed. Well nourished. In no acute distress.  Urethral Meatus: Normal size. Normal location. No lesions. No prolapse.  Vulva: Atrophic. Lesions: No.  Urethra: No masses. No tenderness. No prolapse. No scarring.  Bladder: No masses. No tenderness.  Vagina: Mucosa NI: yes Discharge: no Atrophic: yes Rectocele: no Cystocele: no Vaginal cuff intact: yes  Cervix: Absent.  Uterus: Absent.  Adnexa: Masses: No Tenderness: No       CDS Nodularity: No   Abdomen: obese  No masses. No tenderness.  Breasts: No bilateral masses. No bilateral discharge. No bilateral tenderness. No bilateral fibrocystic changes.  Neck: No thyroid enlargement. No thyroid masses.  Skin: Rashes: No    PROCEDURES:    DIAGNOSIS:  1. Routine gynecological examination        PLAN:     MEDICATIONS & ORDERS:  Orders Placed This Encounter    Liquid-Based Pap Smear, Screening       Patient was counseled today on the new ACS guidelines for cervical cytology screening as well as the current recommendations for breast cancer screening. She was counseled to follow up with her PCP for other routine health maintenance. Counseling session lasted approximately 10 minutes, and all her questions were answered.         FOLLOW-UP: With me ANATOLIY Vyas Jr, MD, FACOG

## 2023-01-20 LAB
CLINICAL INFO: NORMAL
CYTO CVX: NORMAL
CYTOLOGIST CVX/VAG CYTO: NORMAL
CYTOLOGIST CVX/VAG CYTO: NORMAL
CYTOLOGY CMNT CVX/VAG CYTO-IMP: NORMAL
CYTOLOGY PAP THIN PREP EXPLANATION: NORMAL
DATE OF PREVIOUS PAP: NORMAL
DATE PREVIOUS BX: NO
GEN CATEG CVX/VAG CYTO-IMP: NORMAL
LMP START DATE: NORMAL
MICROORGANISM CVX/VAG CYTO: NORMAL
PATHOLOGIST CVX/VAG CYTO: NORMAL
SERVICE CMNT-IMP: NORMAL
SPECIMEN SOURCE CVX/VAG CYTO: NORMAL
STAT OF ADQ CVX/VAG CYTO-IMP: NORMAL

## 2023-01-27 ENCOUNTER — OFFICE VISIT (OUTPATIENT)
Dept: HEPATOLOGY | Facility: CLINIC | Age: 63
End: 2023-01-27
Payer: MEDICARE

## 2023-01-27 ENCOUNTER — PROCEDURE VISIT (OUTPATIENT)
Dept: HEPATOLOGY | Facility: CLINIC | Age: 63
End: 2023-01-27
Payer: MEDICARE

## 2023-01-27 ENCOUNTER — LAB VISIT (OUTPATIENT)
Dept: LAB | Facility: HOSPITAL | Age: 63
End: 2023-01-27
Payer: MEDICARE

## 2023-01-27 ENCOUNTER — PATIENT MESSAGE (OUTPATIENT)
Dept: HEPATOLOGY | Facility: CLINIC | Age: 63
End: 2023-01-27

## 2023-01-27 ENCOUNTER — OFFICE VISIT (OUTPATIENT)
Dept: NEPHROLOGY | Facility: CLINIC | Age: 63
End: 2023-01-27
Payer: MEDICARE

## 2023-01-27 VITALS
BODY MASS INDEX: 40.28 KG/M2 | WEIGHT: 256.63 LBS | SYSTOLIC BLOOD PRESSURE: 130 MMHG | HEART RATE: 73 BPM | OXYGEN SATURATION: 94 % | DIASTOLIC BLOOD PRESSURE: 80 MMHG | HEIGHT: 67 IN

## 2023-01-27 VITALS
DIASTOLIC BLOOD PRESSURE: 77 MMHG | OXYGEN SATURATION: 98 % | WEIGHT: 256.63 LBS | HEIGHT: 67 IN | TEMPERATURE: 98 F | SYSTOLIC BLOOD PRESSURE: 173 MMHG | BODY MASS INDEX: 40.28 KG/M2 | RESPIRATION RATE: 18 BRPM | HEART RATE: 74 BPM

## 2023-01-27 DIAGNOSIS — N25.81 SECONDARY HYPERPARATHYROIDISM OF RENAL ORIGIN: ICD-10-CM

## 2023-01-27 DIAGNOSIS — Z98.890 S/P ARTERIOVENOUS (AV) FISTULA CREATION: Primary | ICD-10-CM

## 2023-01-27 DIAGNOSIS — Z76.82 ORGAN TRANSPLANT CANDIDATE: ICD-10-CM

## 2023-01-27 DIAGNOSIS — D63.1 ANEMIA OF CHRONIC RENAL FAILURE, STAGE 5: ICD-10-CM

## 2023-01-27 DIAGNOSIS — Z86.19 HEPATITIS C VIRUS INFECTION CURED AFTER ANTIVIRAL DRUG THERAPY: Primary | ICD-10-CM

## 2023-01-27 DIAGNOSIS — Z86.19 HEPATITIS C VIRUS INFECTION CURED AFTER ANTIVIRAL DRUG THERAPY: ICD-10-CM

## 2023-01-27 DIAGNOSIS — D68.2 FACTOR XII DEFICIENCY: ICD-10-CM

## 2023-01-27 DIAGNOSIS — N18.5 ANEMIA OF CHRONIC RENAL FAILURE, STAGE 5: ICD-10-CM

## 2023-01-27 DIAGNOSIS — I77.0 A-V FISTULA: ICD-10-CM

## 2023-01-27 DIAGNOSIS — N18.5 CHRONIC KIDNEY DISEASE (CKD), STAGE V: Primary | ICD-10-CM

## 2023-01-27 DIAGNOSIS — D50.9 IRON DEFICIENCY ANEMIA, UNSPECIFIED IRON DEFICIENCY ANEMIA TYPE: ICD-10-CM

## 2023-01-27 LAB — APTT BLDCRRT: 39.2 SEC (ref 21–32)

## 2023-01-27 PROCEDURE — 3066F NEPHROPATHY DOC TX: CPT | Mod: HCNC,CPTII,S$GLB, | Performed by: INTERNAL MEDICINE

## 2023-01-27 PROCEDURE — 99499 UNLISTED E&M SERVICE: CPT | Mod: HCNC,S$GLB,, | Performed by: PHYSICIAN ASSISTANT

## 2023-01-27 PROCEDURE — 3079F PR MOST RECENT DIASTOLIC BLOOD PRESSURE 80-89 MM HG: ICD-10-PCS | Mod: HCNC,CPTII,S$GLB, | Performed by: INTERNAL MEDICINE

## 2023-01-27 PROCEDURE — 3008F PR BODY MASS INDEX (BMI) DOCUMENTED: ICD-10-PCS | Mod: HCNC,CPTII,S$GLB, | Performed by: INTERNAL MEDICINE

## 2023-01-27 PROCEDURE — 76981 USE PARENCHYMA: CPT | Mod: S$GLB,TXP,, | Performed by: PHYSICIAN ASSISTANT

## 2023-01-27 PROCEDURE — 99499 RISK ADDL DX/OHS AUDIT: ICD-10-PCS | Mod: HCNC,S$GLB,, | Performed by: PHYSICIAN ASSISTANT

## 2023-01-27 PROCEDURE — 99999 PR PBB SHADOW E&M-EST. PATIENT-LVL IV: ICD-10-PCS | Mod: PBBFAC,HCNC,TXP, | Performed by: PHYSICIAN ASSISTANT

## 2023-01-27 PROCEDURE — 3066F PR DOCUMENTATION OF TREATMENT FOR NEPHROPATHY: ICD-10-PCS | Mod: CPTII,S$GLB,TXP, | Performed by: PHYSICIAN ASSISTANT

## 2023-01-27 PROCEDURE — 3075F PR MOST RECENT SYSTOLIC BLOOD PRESS GE 130-139MM HG: ICD-10-PCS | Mod: HCNC,CPTII,S$GLB, | Performed by: INTERNAL MEDICINE

## 2023-01-27 PROCEDURE — 99999 PR PBB SHADOW E&M-EST. PATIENT-LVL IV: ICD-10-PCS | Mod: PBBFAC,HCNC,, | Performed by: INTERNAL MEDICINE

## 2023-01-27 PROCEDURE — 85390 FIBRINOLYSINS SCREEN I&R: CPT | Mod: 91,HCNC,NTX | Performed by: PHYSICIAN ASSISTANT

## 2023-01-27 PROCEDURE — 85290 CLOT FACTOR XIII FIBRIN STAB: CPT | Mod: HCNC,TXP | Performed by: PHYSICIAN ASSISTANT

## 2023-01-27 PROCEDURE — 3077F PR MOST RECENT SYSTOLIC BLOOD PRESSURE >= 140 MM HG: ICD-10-PCS | Mod: CPTII,S$GLB,TXP, | Performed by: PHYSICIAN ASSISTANT

## 2023-01-27 PROCEDURE — 36415 COLL VENOUS BLD VENIPUNCTURE: CPT | Mod: HCNC,TXP | Performed by: PHYSICIAN ASSISTANT

## 2023-01-27 PROCEDURE — 3066F NEPHROPATHY DOC TX: CPT | Mod: CPTII,S$GLB,TXP, | Performed by: PHYSICIAN ASSISTANT

## 2023-01-27 PROCEDURE — 3066F PR DOCUMENTATION OF TREATMENT FOR NEPHROPATHY: ICD-10-PCS | Mod: HCNC,CPTII,S$GLB, | Performed by: INTERNAL MEDICINE

## 2023-01-27 PROCEDURE — 1159F MED LIST DOCD IN RCRD: CPT | Mod: HCNC,CPTII,S$GLB, | Performed by: INTERNAL MEDICINE

## 2023-01-27 PROCEDURE — 1160F RVW MEDS BY RX/DR IN RCRD: CPT | Mod: HCNC,CPTII,S$GLB, | Performed by: INTERNAL MEDICINE

## 2023-01-27 PROCEDURE — 3008F PR BODY MASS INDEX (BMI) DOCUMENTED: ICD-10-PCS | Mod: CPTII,S$GLB,TXP, | Performed by: PHYSICIAN ASSISTANT

## 2023-01-27 PROCEDURE — 1159F PR MEDICATION LIST DOCUMENTED IN MEDICAL RECORD: ICD-10-PCS | Mod: HCNC,CPTII,S$GLB, | Performed by: INTERNAL MEDICINE

## 2023-01-27 PROCEDURE — 99999 PR PBB SHADOW E&M-EST. PATIENT-LVL IV: CPT | Mod: PBBFAC,HCNC,TXP, | Performed by: PHYSICIAN ASSISTANT

## 2023-01-27 PROCEDURE — 3079F DIAST BP 80-89 MM HG: CPT | Mod: HCNC,CPTII,S$GLB, | Performed by: INTERNAL MEDICINE

## 2023-01-27 PROCEDURE — 99214 OFFICE O/P EST MOD 30 MIN: CPT | Mod: S$GLB,TXP,, | Performed by: PHYSICIAN ASSISTANT

## 2023-01-27 PROCEDURE — 99215 PR OFFICE/OUTPT VISIT, EST, LEVL V, 40-54 MIN: ICD-10-PCS | Mod: HCNC,S$GLB,, | Performed by: INTERNAL MEDICINE

## 2023-01-27 PROCEDURE — 99214 PR OFFICE/OUTPT VISIT, EST, LEVL IV, 30-39 MIN: ICD-10-PCS | Mod: S$GLB,TXP,, | Performed by: PHYSICIAN ASSISTANT

## 2023-01-27 PROCEDURE — 85247 CLOT FACTOR VIII MULTIMETRIC: CPT | Mod: HCNC,TXP | Performed by: PHYSICIAN ASSISTANT

## 2023-01-27 PROCEDURE — 3008F BODY MASS INDEX DOCD: CPT | Mod: CPTII,S$GLB,TXP, | Performed by: PHYSICIAN ASSISTANT

## 2023-01-27 PROCEDURE — 85240 CLOT FACTOR VIII AHG 1 STAGE: CPT | Mod: HCNC,TXP | Performed by: PHYSICIAN ASSISTANT

## 2023-01-27 PROCEDURE — 3078F DIAST BP <80 MM HG: CPT | Mod: CPTII,S$GLB,TXP, | Performed by: PHYSICIAN ASSISTANT

## 2023-01-27 PROCEDURE — 99999 PR PBB SHADOW E&M-EST. PATIENT-LVL IV: CPT | Mod: PBBFAC,HCNC,, | Performed by: INTERNAL MEDICINE

## 2023-01-27 PROCEDURE — 3078F PR MOST RECENT DIASTOLIC BLOOD PRESSURE < 80 MM HG: ICD-10-PCS | Mod: CPTII,S$GLB,TXP, | Performed by: PHYSICIAN ASSISTANT

## 2023-01-27 PROCEDURE — 85730 THROMBOPLASTIN TIME PARTIAL: CPT | Mod: HCNC,TXP | Performed by: PHYSICIAN ASSISTANT

## 2023-01-27 PROCEDURE — 1160F PR REVIEW ALL MEDS BY PRESCRIBER/CLIN PHARMACIST DOCUMENTED: ICD-10-PCS | Mod: HCNC,CPTII,S$GLB, | Performed by: INTERNAL MEDICINE

## 2023-01-27 PROCEDURE — 3075F SYST BP GE 130 - 139MM HG: CPT | Mod: HCNC,CPTII,S$GLB, | Performed by: INTERNAL MEDICINE

## 2023-01-27 PROCEDURE — 3077F SYST BP >= 140 MM HG: CPT | Mod: CPTII,S$GLB,TXP, | Performed by: PHYSICIAN ASSISTANT

## 2023-01-27 PROCEDURE — 85245 CLOT FACTOR VIII VW RISTOCTN: CPT | Mod: HCNC,NTX | Performed by: PHYSICIAN ASSISTANT

## 2023-01-27 PROCEDURE — 3008F BODY MASS INDEX DOCD: CPT | Mod: HCNC,CPTII,S$GLB, | Performed by: INTERNAL MEDICINE

## 2023-01-27 PROCEDURE — 99215 OFFICE O/P EST HI 40 MIN: CPT | Mod: HCNC,S$GLB,, | Performed by: INTERNAL MEDICINE

## 2023-01-27 PROCEDURE — 76981 FIBROSCAN NEW ORLEANS (VIBRATION CONTROLLED TRANSIENT ELASTOGRAPHY): ICD-10-PCS | Mod: S$GLB,TXP,, | Performed by: PHYSICIAN ASSISTANT

## 2023-01-27 NOTE — PROGRESS NOTES
"HEPATOLOGY CLINIC VISIT NOTE - HCV clinic  REFERRING PROVIDER: Maria Del Carmen Holland NP  CHIEF COMPLAINT: pre kidney transplant clearance, hx of HCV  Here w/ son    HISTORY       This is a 62 y.o. Black or  female w/ CKD due to DM and HTN, currently pre dialysis, undergoing transplant eval, here for hepatic clearance. She has a hx of previously treated / cured HCV (Porsha 2015, Dr Herbert) and was actually seen by me in 2018 for abnl liver imaging      HCV history:  S/p Harvoni 2015 - cured    Liver staging:  FibroScan 6/12/18 - kPa 8.8 (F2)  FibroScan today 1/27/23 - kPa 6.5 (F0-1),  S0  Current labs and imaging support staging: well preserved liver function, no evidence of advanced fibrosis    Liver imaging review:  10/2015 U/S - No liver lesions  10/2015 CT abd w/o contrast - No liver lesions  2/2016 MRI abd - "Partially evaluated liver demonstrates several foci of ill-defined T2 hyperintensity and portal venous hyper enhancement in hepatic segment VI.  Largest focus measures approximately 2.6 cm in diameter"   Indeterminate, favors benign etiology  6/2018 tpCT - no liver lesions  2/2019 U/S - no liver lesions  8/2019 U/S - no liver lesions  6/2021 CT w/o contrast - no liver lesions  7/2022 CT w/o contrast - no liver lesions  11/2022 U/S - no liver lesions      Feels okay  Notes frequent BMs, nausea, right sided abd discomfort which she is attributing to her kidney disease  Denies jaundice, abdominal distention, hematemesis, melena, slowed mentation.       PMH, PSH, SOCIAL HX, FAMILY HX      Reviewed in Epic  Pertinent findings:  FAMILY HX: neg for liver diease  SOCIAL HX:  Alcohol - no  Drugs - no      ROS: as per HPI, in addition:      PHYSICAL EXAM:  Friendly Black or  female, in no acute distress; alert and oriented to person, place and time  VITALS: reviewed  HEENT: Sclerae anicteric.   LUNGS: Normal respiratory effort.   ABDOMEN: nondistended, soft, nontender. No organomegaly " or masses. No ascites or hernias.   SKIN: Warm and dry. No jaundice, No obvious rashes.   NEURO/PSYCH: Normal gate. Memory intact. Thought and speech pattern appropriate. Behavior normal. No depression or anxiety noted.    PERTINENT DIAGNOSTIC RESULTS      Lab Results   Component Value Date    WBC 12.63 12/19/2022    HGB 9.2 (L) 12/19/2022     12/19/2022     Lab Results   Component Value Date    INR 1.1 12/21/2022     Lab Results   Component Value Date    AST 11 11/17/2022    ALT 9 (L) 11/17/2022    BILITOT 0.5 11/17/2022    ALBUMIN 3.3 (L) 12/19/2022    ALKPHOS 93 11/17/2022    CREATININE 3.8 (H) 12/19/2022    BUN 39 (H) 12/19/2022     12/19/2022    K 4.1 12/19/2022    AFP 6.7 11/17/2022     Imaging studies - see above    ASSESSMENT        62 y.o. Black or  female with:  1. HISTORY OF HCV - treated / cured.   -- s/p antonieta, SVR 2015 w/ recent HCV RNA still neg as expected  -- no f/u needed    2. HISTORY OF LIVER LESIONS ON ISOLATED IMAGING IN 2016  -- review at that time: indeterminate, likely benign  -- numerous imaging studies since then w/o liver lesions noted  -- normal AFP  -- no further eval needed    PLAN        FibroScan today - reveals F0-1  Pt is clear for kidney transplant. Does not require combined liver-kidney transplant. From liver standpoint, can receive HCV (+) donor.    Otherwise, no add'l f/u needed in liver clinic      __________________________________________________________________    Duration of encounter: 38 min  This includes face-to-face time and non face-to-face time preparing to see the patient (eg, review of tests), obtaining and/or reviewing separately obtained history, documenting clinical information in the electronic or other health record, independently interpreting resultsand communicating results to the patient/family/caregiver, or care coordination.

## 2023-01-27 NOTE — PROCEDURES
FibroScan Success (Vibration Controlled Transient Elastography)    Date/Time: 1/27/2023 10:00 AM  Performed by: Jennifer B. Scheuermann, PA  Authorized by: Jennifer B. Scheuermann, PA     Diagnosis:  HCV    Probe:  XL    Universal Protocol: Patient's identity, procedure and site were verified, confirmatory pause was performed.  Discussed procedure including risks and potential complications.  Questions answered.  Patient verbalizes understanding and wishes to proceed with VCTE.     Procedure: After providing explanations of the procedure, patient was placed in the supine position with right arm in maximum abduction to allow optimal exposure of right lateral abdomen.  Patient was briefly assessed, Testing was performed in the mid-axillary location, 50Hz Shear Wave pulses were applied and the resulting Shear Wave and Propagation Speed detected with a 3.5 MHz ultrasonic signal, using the FibroScan probe, Skin to liver capsule distance and liver parenchyma were accessed during the entire examination with the FibroScan probe, Patient was instructed to breathe normally and to abstain from sudden movements during the procedure, allowing for random measurements of liver stiffness. At least 10 Shear Waves were produced, Individual measurements of each Shear Wave were calculated.  Patient tolerated the procedure well with no complications.  Meets discharge criteria as was dismissed.  Rates pain 0 out of 10.  Patient will follow up with ordering provider to review results.    Findings  Median liver stiffness score:  6.5  CAP Reading: dB/m:  231    IQR/med %:  18  Interpretation  Fibrosis interpretation is based on medial liver stiffness - Kilopascal (kPa).    Fibrosis Stage:  F 0-1  Steatosis interpretation is based on controlled attenuation parameter - (dB/m).    Steatosis Grade:  <S1

## 2023-01-27 NOTE — PROGRESS NOTES
REASON FOR VISIT: ckd    REFERRING PHYSICIAN: Elan Price MD    HISTORY OF PRESENT ILLNESS: 62 y.o. female who has long standing DMII with retinopathy, HTN, CVA, and multiple ARUNA's the last was from urinary retention. patient was referred here for abnormal renal function, her renal function has been declining due to above mentioned reasons, she used to see us in the clinic but lost follow up.     No major complains during this clinic visit.    ALLERGIES:  Review of patient's allergies indicates:   Allergen Reactions    Atorvastatin Other (See Comments)     Statin induced myalgia    Mushroom Hives    Peanut Anaphylaxis    Bactrim [sulfamethoxazole-trimethoprim] Hives    Gabapentin      Pruritis     Iodine     Peanut butter flavor     Penicillins Hives    Shellfish containing products Itching    Sulfa (sulfonamide antibiotics) Hives       MEDICATIONS:    Current Outpatient Medications:     amLODIPine (NORVASC) 10 MG tablet, Take 1 tablet (10 mg total) by mouth nightly., Disp: 90 tablet, Rfl: 3    carvediloL (COREG) 25 MG tablet, Take 1 tablet (25 mg total) by mouth 2 (two) times daily with meals., Disp: 180 tablet, Rfl: 3    cholecalciferol, vitamin D3, 125 mcg (5,000 unit) Tab, Take 1 tablet (5,000 Units total) by mouth once daily., Disp: 90 tablet, Rfl: 3    EScitalopram oxalate (LEXAPRO) 10 MG tablet, Take 1 tablet (10 mg total) by mouth once daily., Disp: 90 tablet, Rfl: 3    furosemide (LASIX) 40 MG tablet, Take 1 tablet (40 mg total) by mouth 2 (two) times a day., Disp: 180 tablet, Rfl: 3    levocetirizine (XYZAL) 5 MG tablet, Take 1 tablet (5 mg total) by mouth daily as needed for Allergies., Disp: 90 tablet, Rfl: 3    montelukast (SINGULAIR) 5 MG chewable tablet, Take 1 tablet (5 mg total) by mouth every evening., Disp: 30 tablet, Rfl: 0    sodium bicarbonate 325 MG tablet, Take 2 tablets (650 mg total) by mouth 2 (two) times daily., Disp: 120 tablet, Rfl: 6    SYNTHROID 200 mcg tablet, Take 1 tablet (200  "mcg total) by mouth before breakfast., Disp: 90 tablet, Rfl: 3    traZODone (DESYREL) 50 MG tablet, Take 1 to 2 tablets ( mg total) by mouth nightly as needed for Insomnia., Disp: 120 tablet, Rfl: 3   Medication List with Changes/Refills   Current Medications    AMLODIPINE (NORVASC) 10 MG TABLET    Take 1 tablet (10 mg total) by mouth nightly.    CARVEDILOL (COREG) 25 MG TABLET    Take 1 tablet (25 mg total) by mouth 2 (two) times daily with meals.    CHOLECALCIFEROL, VITAMIN D3, 125 MCG (5,000 UNIT) TAB    Take 1 tablet (5,000 Units total) by mouth once daily.    ESCITALOPRAM OXALATE (LEXAPRO) 10 MG TABLET    Take 1 tablet (10 mg total) by mouth once daily.    FUROSEMIDE (LASIX) 40 MG TABLET    Take 1 tablet (40 mg total) by mouth 2 (two) times a day.    LEVOCETIRIZINE (XYZAL) 5 MG TABLET    Take 1 tablet (5 mg total) by mouth daily as needed for Allergies.    MONTELUKAST (SINGULAIR) 5 MG CHEWABLE TABLET    Take 1 tablet (5 mg total) by mouth every evening.    SODIUM BICARBONATE 325 MG TABLET    Take 2 tablets (650 mg total) by mouth 2 (two) times daily.    SYNTHROID 200 MCG TABLET    Take 1 tablet (200 mcg total) by mouth before breakfast.    TRAZODONE (DESYREL) 50 MG TABLET    Take 1 to 2 tablets ( mg total) by mouth nightly as needed for Insomnia.        PHYSICAL EXAM:  /80   Pulse 73   Ht 5' 7" (1.702 m)   Wt 116.4 kg (256 lb 9.9 oz)   SpO2 (!) 94%   BMI 40.19 kg/m²     General: No distress, No fever or chills  Neck: No adenopathy,no carotid bruit,no JVD  Lungs:Clear to auscultation bilaterally, No Crackles  Heart: Regular rate and rhythm, no murmur, gallops or rubs  Abdomen: Soft, no tenderness, bowel sounds normal  Extremities: Atraumatic, no edema in LE  Skin: Turgor normal. No rashes or ulcers  Neurologic: No focal weakness, oriented.  Dialysis Access: Non applicable         LABS:  Lab Results   Component Value Date    CREATININE 3.8 (H) 12/19/2022       Prot/Creat Ratio, Urine   Date " Value Ref Range Status   05/27/2019 1.37 (H) 0.00 - 0.20 Final   02/21/2019 1.70 (H) 0.00 - 0.20 Final   08/23/2018 3.85 (H) 0.00 - 0.20 Final       Lab Results   Component Value Date     12/19/2022    K 4.1 12/19/2022    CO2 22 (L) 12/19/2022       last PTH   Lab Results   Component Value Date    .9 (H) 11/17/2022    CALCIUM 7.8 (L) 12/19/2022    PHOS 4.3 12/19/2022       Lab Results   Component Value Date    HGB 9.2 (L) 12/19/2022        Lab Results   Component Value Date    HGBA1C 5.5 11/17/2022       Lab Results   Component Value Date    LDLCALC 105.6 11/17/2022           ASSESSMENT:    CKD V  Left arm AV fistula - Thrill felt  Metabolic acidosis  -Cr baseline around 3 and progressing. 2018 - ANCA, JB, IF,HIV, cryo, Hep panel wnl (s/p Haarvoni Rx for Hep c in 2015)  -likely from DMII (with retinopathy) /uncontrolled HTN/PVD (CVA with residual weakness) and recurrent AKIs  -UPCR 1.5 g/g, used to be in nephrotic range before  -Renal US showed 10.3 and 10.8 cm kidneys.  -Continue sodium bicarbonate 650 mg BID.  -Was on Lisinopril before not sure what prompted to stop it, Dont expect it to make a lot of difference though.    HTN  Controlled better at home on Coreg, Thiazide and Amlodipine     Anemia  -from ckd  -Hgb goal ~ 10  -Iron stores low  -Ordering Iron infusions, patient understands risks and gave verbal consent    Secondary Hyperparathyroidism  -Phos high/Ca acceptable  -PTH trending up. Already on vitamin D, start calcitriol          RTC in 2 months with labs      Thanks for allowing me to participate in the care of this patient.     3:32 PM

## 2023-01-28 LAB
FACT VIII ACT/NOR PPP: 71 % (ref 55–200)
VON WILLEBRAND EVAL PPP-IMP: ABNORMAL
VWF AG ACT/NOR PPP IA: 69 % (ref 55–200)
VWF AG ACT/NOR PPP IA: 69 % (ref 55–200)
VWF:AC ACT/NOR PPP IA: 50 % (ref 55–200)

## 2023-01-29 ENCOUNTER — PATIENT MESSAGE (OUTPATIENT)
Dept: NEPHROLOGY | Facility: CLINIC | Age: 63
End: 2023-01-29
Payer: MEDICARE

## 2023-01-29 PROBLEM — I77.0 A-V FISTULA: Status: ACTIVE | Noted: 2023-01-29

## 2023-01-29 PROBLEM — D50.9 IRON DEFICIENCY ANEMIA: Status: ACTIVE | Noted: 2023-01-29

## 2023-01-29 RX ORDER — SODIUM CHLORIDE 0.9 % (FLUSH) 0.9 %
10 SYRINGE (ML) INJECTION
Status: CANCELLED | OUTPATIENT
Start: 2023-01-30

## 2023-01-29 RX ORDER — CALCITRIOL 0.25 UG/1
0.25 CAPSULE ORAL DAILY
Qty: 90 CAPSULE | Refills: 1 | Status: SHIPPED | OUTPATIENT
Start: 2023-01-29 | End: 2023-03-27 | Stop reason: ALTCHOICE

## 2023-01-29 RX ORDER — DIPHENHYDRAMINE HYDROCHLORIDE 50 MG/ML
50 INJECTION INTRAMUSCULAR; INTRAVENOUS ONCE AS NEEDED
Status: CANCELLED | OUTPATIENT
Start: 2023-01-30

## 2023-01-29 RX ORDER — METHYLPREDNISOLONE SOD SUCC 125 MG
125 VIAL (EA) INJECTION ONCE AS NEEDED
Status: CANCELLED | OUTPATIENT
Start: 2023-01-30

## 2023-01-29 RX ORDER — EPINEPHRINE 0.3 MG/.3ML
0.3 INJECTION SUBCUTANEOUS ONCE AS NEEDED
Status: CANCELLED | OUTPATIENT
Start: 2023-01-30

## 2023-01-30 ENCOUNTER — ANESTHESIA EVENT (OUTPATIENT)
Dept: SURGERY | Facility: HOSPITAL | Age: 63
DRG: 252 | End: 2023-01-30
Payer: MEDICARE

## 2023-01-30 ENCOUNTER — TELEPHONE (OUTPATIENT)
Dept: VASCULAR SURGERY | Facility: CLINIC | Age: 63
End: 2023-01-30
Payer: MEDICARE

## 2023-01-30 NOTE — PRE-PROCEDURE INSTRUCTIONS
PreOp Instructions given:   - Verbal medication information (what to hold and what to take)   - NPO guidelines 2300  - Arrival place directions given; time to be given the day before procedure by the   Surgeon's Office 0500 DOSC  - Bathing with antibacterial soap   - Don't wear any jewelry or bring any valuables AM of surgery   - No makeup or moisturizer to face   - No perfume/cologne, powder, lotions or aftershave   Pt. verbalized understanding.   Pt denies any h/o Anesthesia/Sedation complications or side effects.  Factor XI deficiency -

## 2023-01-30 NOTE — ANESTHESIA PREPROCEDURE EVALUATION
[facility]  Anesthesia Pre-Operative Evaluation         Patient Name: Kathie Quezada  YOB: 1960  MRN: [id number]    SUBJECTIVE:     Pre-operative evaluation for Procedure(s) (LRB):  TRANSPOSITION, VEIN, BASILIC (Left)     01/30/2023    Kathie Quezada is a 62 y.o. female w/ a significant PMHx of CVA, CKD (pre-dialysis), HTN, GERD, Factor XI deficiency, chronic diastolic heart failure, T2DM and hx of HCV. She is s/p AVF creation.     Patient now presents for the above procedure(s).    PET Stress Test 11/28/22    The myocardial perfusion images are normal without evidence of ischemia or scar.    The whole heart absolute myocardial perfusion values averaged 1.03 cc/min/g at rest, which is elevated; 1.40 cc/min/g at stress, which is mildly reduced; and CFR is 1.36 , which is moderately reduced in part due to elevated resting flow.    CT attenuation images demonstrate no coronary calcifications and no aortic calcifications.    The gated perfusion images showed an ejection fraction of 67% at rest and 68% during stress. A normal ejection fraction is greater than 47%.    The wall motion is normal at rest and during stress.    The LV cavity size is normal at rest and stress.    The EKG portion of this study is negative for ischemia.    During stress, occasional PVCs are noted.    The patient reported no chest pain during the stress test.    There are no prior studies for comparison.     TTE 9/12/22   Severe left atrial enlargement.   The left ventricle is normal in size with mild eccentric hypertrophy and normal systolic function.   The estimated ejection fraction is 65%.   Indeterminate left ventricular diastolic function.   Normal right ventricular size with normal right ventricular systolic function.   Mild mitral regurgitation.   Normal central venous pressure (3 mmHg).            LDA:       Hemodialysis AV Fistula 12/16/22 0649 Left upper arm (Active)   Site Assessment Clean;Dry;Intact;No redness;No swelling;No drainage;No warmth 12/16/22 1109   Patency Present;Thrill;Bruit 12/16/22 1109   Site Condition No complications 12/16/22 1109   Number of days: 45       Prev airway: None documented.          Patient Active Problem List   Diagnosis    Postoperative hypothyroidism    Renovascular hypertension    Mixed hyperlipidemia    Nephrotic range proteinuria    History of hepatitis C    Chronic diastolic heart failure    Factor XI deficiency    Vitamin D deficiency    S/P arteriovenous (AV) fistula creation    Generalized anxiety disorder    Class 2 severe obesity due to excess calories with serious comorbidity and body mass index (BMI) of 35.0 to 35.9 in adult    Diabetic polyneuropathy associated with type 2 diabetes mellitus    Controlled type 2 diabetes mellitus with stable proliferative retinopathy of both eyes, unspecified whether long term insulin use    Anemia in end-stage renal disease    Type 2 diabetes mellitus with stage 5 chronic kidney disease not on chronic dialysis, without long-term current use of insulin    Myalgia due to statin    Chronic kidney disease (CKD), stage V    Osteopenia of multiple sites    Chronic allergic rhinitis    A-V fistula    Iron deficiency anemia       Review of patient's allergies indicates:   Allergen Reactions    Atorvastatin Other (See Comments)     Statin induced myalgia    Mushroom Hives    Peanut Anaphylaxis    Bactrim [sulfamethoxazole-trimethoprim] Hives    Gabapentin      Pruritis     Iodine     Peanut butter flavor     Penicillins Hives    Shellfish containing products Itching    Sulfa (sulfonamide antibiotics) Hives       Current Inpatient Medications:      No current facility-administered medications on file prior to encounter.     Current Outpatient Medications on File Prior to Encounter   Medication Sig  Dispense Refill    sodium bicarbonate 325 MG tablet Take 2 tablets (650 mg total) by mouth 2 (two) times daily. 120 tablet 6       Past Surgical History:   Procedure Laterality Date    AV FISTULA PLACEMENT Left 10/27/2022    Procedure: CREATION, AV FISTULA;  Surgeon: Tirso Tavera MD;  [facility];  Service: Peripheral Vascular;  Laterality: Left;  Brachio basilic 1st stage     BACK SURGERY  2017    CHOLECYSTECTOMY  2015    [facility]    COLONOSCOPY  11/13/2015    Dr Chino torres prep. internal hemorrhoids. diverticulosis of sigmois. NO polyps. repeat due 2025    FISTULOGRAM N/A 12/16/2022    Procedure: Fistulogram;  Surgeon: Tirso Tavera MD;  Location: [facility] CATH LAB;  Service: Peripheral Vascular;  Laterality: N/A;    FIXATION KYPHOPLASTY Bilateral 08/14/2019    Procedure: L2 kyphoplasty Fluoro Globus;  Surgeon: Jeremie Kwon DO;  Location: [facility] OR;  Service: Neurosurgery;  Laterality: Bilateral;  GLOBUS MACRINA HCAUDHARIYIFAN [phone] TEXTED HER @ 10:54AM ON 7-30-19  PRE-OP-BY RN  8-7-2019    HYSTERECTOMY      OOPHORECTOMY  1996    one    PERCUTANEOUS TRANSLUMINAL ANGIOPLASTY OF ARTERIOVENOUS FISTULA Left 12/16/2022    Procedure: PTA, AV FISTULA;  Surgeon: Tirso Tavera MD;  Location: [facility] CATH LAB;  Service: Peripheral Vascular;  Laterality: Left;    THYROIDECTOMY  2016    at [facility] for thyroid nodule    TONSILLECTOMY         Social History     Socioeconomic History    Marital status:    Tobacco Use    Smoking status: Never    Smokeless tobacco: Never   Substance and Sexual Activity    Alcohol use: Not Currently    Drug use: No    Sexual activity: Not Currently   Social History Narrative    Pt enjoys cooking and baking and making Floral arrangement and Kerline [employer]      Social Determinants of Health     Financial Resource Strain: Low Risk     Difficulty of Paying Living Expenses: Not hard at all   Food Insecurity: No Food Insecurity    Worried About Running Out of Food  in the Last Year: Never true    Ran Out of Food in the Last Year: Never true   Transportation Needs: No Transportation Needs    Lack of Transportation (Medical): No    Lack of Transportation (Non-Medical): No   Physical Activity: Unknown    Days of Exercise per Week: 1 day   Stress: No Stress Concern Present    Feeling of Stress : Only a little   Social Connections: Unknown    Frequency of Communication with Friends and Family: More than three times a week    Frequency of Social Gatherings with Friends and Family: More than three times a week    Active Member of Clubs or Organizations: Yes    Attends Club or Organization Meetings: 1 to 4 times per year    Marital Status:    Housing Stability: Low Risk     Unable to Pay for Housing in the Last Year: No    Number of Places Lived in the Last Year: 1    Unstable Housing in the Last Year: No       OBJECTIVE:     Vital Signs Range (Last 24H):         Significant Labs:  Lab Results   Component Value Date    WBC 9.78 01/27/2023    HGB 9.4 (L) 01/27/2023    HCT 31.1 (L) 01/27/2023     01/27/2023    CHOL 179 11/17/2022    TRIG 102 11/17/2022    HDL 53 11/17/2022    ALT 6 (L) 01/27/2023    AST 12 01/27/2023     01/27/2023    K 4.3 01/27/2023     01/27/2023    CREATININE 3.6 (H) 01/27/2023    BUN 23 (H) 01/27/2023    CO2 22 (L) 01/27/2023    TSH 43.212 (H) 09/11/2022    INR 1.1 12/21/2022    HGBA1C 5.5 11/17/2022       Diagnostic Studies: No relevant studies.    EKG:   Results for orders placed or performed during the hospital encounter of 09/11/22   ECG 12 lead    Collection Time: 09/11/22  8:14 PM    Narrative    Test Reason : I63.9,    Vent. Rate : 080 BPM     Atrial Rate : 080 BPM     P-R Int : 188 ms          QRS Dur : 082 ms      QT Int : 396 ms       P-R-T Axes : 056 067 043 degrees     QTc Int : 456 ms    Normal sinus rhythm  Normal ECG  When compared with ECG of 30-JUL-2022 13:20,  No significant change was found  Confirmed by  Yayo Terry MD ([id number]) on 9/12/2022 11:34:40 PM    Referred By: [email or web address]   SELF           Confirmed By:Yayo Terry MD       2D ECHO:  TTE:  Results for orders placed or performed during the hospital encounter of 09/11/22   Echo   Result Value Ref Range    Ascending aorta 2.98 cm    STJ 2.89 cm    AV mean gradient 3 mmHg    Ao peak marjorie 1.01 m/s    Ao VTI 21.57 cm    IVS 1.30 (A) 0.6 - 1.1 cm    LA size 4.17 cm    Left Atrium Major Axis 6.97 cm    Left Atrium Minor Axis 7.20 cm    LVIDd 502.00 3.5 - 6.0 cm    LVIDs 3.40 2.1 - 4.0 cm    LVOT diameter 2.02 cm    LVOT peak VTI 17.24 cm    Posterior Wall 1.10 (A) 0.6 - 1.1 cm    RA Major Axis 6.48 cm    RA Width 3.88 cm    RVDD 3.33 cm    Sinus 2.86 cm    TDI LATERAL 0.08 m/s    TDI SEPTAL 0.05 m/s    LA WIDTH 4.44 cm    LV Diastolic Volume 103.59 mL    LV Systolic Volume 47.47 mL    LVOT peak marjorie 0.74 m/s    FS 99 %    LA volume 111.47 cm3    LV mass 1,516,834.10 g    Left Ventricle Relative Wall Thickness 0.00 cm    AV valve area 2.56 cm2    AV Velocity Ratio 0.73     AV index (prosthetic) 0.80     Mean e' 0.07 m/s    LVOT area 3.2 cm2    LVOT stroke volume 55.22 cm3    AV peak gradient 4 mmHg    BSA 2.4 m2    LV Systolic Volume Index 20.5 mL/m2    LV Diastolic Volume Index 44.84 mL/m2    LA Volume Index 48.3 mL/m2    LV Mass Index 656,638 g/m2    Right Atrial Pressure (from IVC) 3 mmHg    EF 65 %    Narrative    · Severe left atrial enlargement.  · The left ventricle is normal in size with mild eccentric hypertrophy and   normal systolic function.  · The estimated ejection fraction is 65%.  · Indeterminate left ventricular diastolic function.  · Normal right ventricular size with normal right ventricular systolic   function.  · Mild mitral regurgitation.  · Normal central venous pressure (3 mmHg).          LV:  No results found for this or any previous visit.    ASSESSMENT/PLAN:           Pre-op Assessment    I have reviewed the Patient Summary Reports.     I have  reviewed the Nursing Notes.    I have reviewed the Medications.     Review of Systems  Anesthesia Hx:  No problems with previous Anesthesia   Denies Personal Hx of Anesthesia complications.   Social:  Non-Smoker, No Alcohol Use    Hematology/Oncology:         -- Anemia: Hematology Comments: Factor XI deficiency   Cardiovascular:   Exercise tolerance: good Denies Pacemaker. Hypertension Valvular problems/Murmurs (Mild MR), MR  CHF hyperlipidemia ECG has been reviewed.    Pulmonary:  Pulmonary Normal    Renal/:   Chronic Renal Disease, CRI    Hepatic/GI:   No Bowel Prep. Denies PUD. GERD Liver Disease, Hepatitis, C    Neurological:   CVA, no residual symptoms Denies Seizures.    Endocrine:   Diabetes, type 2 Hypothyroidism  Obesity / BMI > 30  Psych:   Psychiatric History anxiety          Physical Exam  General: Well nourished, Cooperative and Alert    Airway:  Mallampati: IV   Mouth Opening: Normal  TM Distance: Normal  Tongue: Normal  Neck ROM: Normal ROM    Dental:  Periodontal disease        Anesthesia Plan  Type of Anesthesia, risks & benefits discussed:    Anesthesia Type: MAC, Regional  Intra-op Monitoring Plan: Standard ASA Monitors  Post Op Pain Control Plan: multimodal analgesia, IV/PO Opioids PRN and peripheral nerve block  Induction:  IV  Informed Consent: Informed consent signed with the Patient and all parties understand the risks and agree with anesthesia plan.  All questions answered.   ASA Score: 3  Day of Surgery Review of History & Physical: H&P Update referred to the surgeon/provider.    Ready For Surgery From Anesthesia Perspective.     .

## 2023-01-30 NOTE — TELEPHONE ENCOUNTER
Spoke with the pt and informed her of the time of arrival for surgery tomorrow is 5am at the [facility].Pt also reminded not to eat or drink anything after midnight and she verbalized understanding of information received.

## 2023-01-31 ENCOUNTER — TELEPHONE (OUTPATIENT)
Dept: TRANSPLANT | Facility: CLINIC | Age: 63
End: 2023-01-31
Payer: MEDICARE

## 2023-01-31 ENCOUNTER — ANESTHESIA (OUTPATIENT)
Dept: SURGERY | Facility: HOSPITAL | Age: 63
DRG: 252 | End: 2023-01-31
Payer: MEDICARE

## 2023-01-31 ENCOUNTER — HOSPITAL ENCOUNTER (INPATIENT)
Facility: HOSPITAL | Age: 63
LOS: 1 days | Discharge: HOME | DRG: 252 | End: 2023-01-31
Attending: SURGERY | Admitting: SURGERY
Payer: MEDICARE

## 2023-01-31 VITALS
RESPIRATION RATE: 17 BRPM | HEIGHT: 67 IN | BODY MASS INDEX: 39.87 KG/M2 | OXYGEN SATURATION: 95 % | TEMPERATURE: 98 F | HEART RATE: 69 BPM | DIASTOLIC BLOOD PRESSURE: 84 MMHG | WEIGHT: 254 LBS | SYSTOLIC BLOOD PRESSURE: 150 MMHG

## 2023-01-31 DIAGNOSIS — I77.0 A-V FISTULA: Primary | ICD-10-CM

## 2023-01-31 DIAGNOSIS — G89.18 POST PROCEDURE DISCOMFORT: ICD-10-CM

## 2023-01-31 DIAGNOSIS — I77.0 AVF (ARTERIOVENOUS FISTULA): ICD-10-CM

## 2023-01-31 LAB
FACT XIIIA PPP-ACNC: 127 %
POCT GLUCOSE: 96 MG/DL (ref 70–110)
POCT GLUCOSE: 98 MG/DL (ref 70–110)

## 2023-01-31 PROCEDURE — 93010 EKG 12-LEAD: ICD-10-PCS | Mod: HCNC,NTX,, | Performed by: INTERNAL MEDICINE

## 2023-01-31 PROCEDURE — 27201423 OPTIME MED/SURG SUP & DEVICES STERILE SUPPLY: Mod: HCNC,NTX | Performed by: SURGERY

## 2023-01-31 PROCEDURE — 82962 GLUCOSE BLOOD TEST: CPT | Mod: HCNC,NTX | Performed by: SURGERY

## 2023-01-31 PROCEDURE — 64415: ICD-10-PCS | Mod: 59,HCNC,LT,NTX | Performed by: SURGERY

## 2023-01-31 PROCEDURE — 25000003 PHARM REV CODE 250: Mod: HCNC,NTX | Performed by: SURGERY

## 2023-01-31 PROCEDURE — 25000003 PHARM REV CODE 250: Mod: HCNC,NTX | Performed by: NURSE ANESTHETIST, CERTIFIED REGISTERED

## 2023-01-31 PROCEDURE — 36832 AV FISTULA REVISION OPEN: CPT | Mod: HCNC,NTX,, | Performed by: SURGERY

## 2023-01-31 PROCEDURE — 36000706: Mod: HCNC,NTX | Performed by: SURGERY

## 2023-01-31 PROCEDURE — 93005 ELECTROCARDIOGRAM TRACING: CPT | Mod: HCNC,NTX

## 2023-01-31 PROCEDURE — D9220A PRA ANESTHESIA: ICD-10-PCS | Mod: HCNC,ANES,NTX, | Performed by: ANESTHESIOLOGY

## 2023-01-31 PROCEDURE — 93010 ELECTROCARDIOGRAM REPORT: CPT | Mod: HCNC,NTX,, | Performed by: INTERNAL MEDICINE

## 2023-01-31 PROCEDURE — 76942 ECHO GUIDE FOR BIOPSY: CPT | Mod: HCNC,NTX | Performed by: STUDENT IN AN ORGANIZED HEALTH CARE EDUCATION/TRAINING PROGRAM

## 2023-01-31 PROCEDURE — 25000003 PHARM REV CODE 250: Mod: HCNC,NTX

## 2023-01-31 PROCEDURE — D9220A PRA ANESTHESIA: ICD-10-PCS | Mod: HCNC,CRNA,NTX, | Performed by: NURSE ANESTHETIST, CERTIFIED REGISTERED

## 2023-01-31 PROCEDURE — 36832 PR AV FISTULA REVISION, OPEN, W/O THROMBECTOMY: ICD-10-PCS | Mod: HCNC,NTX,, | Performed by: SURGERY

## 2023-01-31 PROCEDURE — D9220A PRA ANESTHESIA: Mod: HCNC,CRNA,NTX, | Performed by: NURSE ANESTHETIST, CERTIFIED REGISTERED

## 2023-01-31 PROCEDURE — 12000002 HC ACUTE/MED SURGE SEMI-PRIVATE ROOM: Mod: HCNC,NTX

## 2023-01-31 PROCEDURE — 71000045 HC DOSC ROUTINE RECOVERY EA ADD'L HR: Mod: HCNC,NTX | Performed by: SURGERY

## 2023-01-31 PROCEDURE — 63600175 PHARM REV CODE 636 W HCPCS: Mod: HCNC,NTX | Performed by: NURSE ANESTHETIST, CERTIFIED REGISTERED

## 2023-01-31 PROCEDURE — 37000008 HC ANESTHESIA 1ST 15 MINUTES: Mod: HCNC,NTX | Performed by: SURGERY

## 2023-01-31 PROCEDURE — 71000015 HC POSTOP RECOV 1ST HR: Mod: HCNC,NTX | Performed by: SURGERY

## 2023-01-31 PROCEDURE — 71000016 HC POSTOP RECOV ADDL HR: Mod: HCNC,NTX | Performed by: SURGERY

## 2023-01-31 PROCEDURE — D9220A PRA ANESTHESIA: Mod: HCNC,ANES,NTX, | Performed by: ANESTHESIOLOGY

## 2023-01-31 PROCEDURE — 37000009 HC ANESTHESIA EA ADD 15 MINS: Mod: HCNC,NTX | Performed by: SURGERY

## 2023-01-31 PROCEDURE — 63600175 PHARM REV CODE 636 W HCPCS: Mod: HCNC,NTX | Performed by: SURGERY

## 2023-01-31 PROCEDURE — 71000044 HC DOSC ROUTINE RECOVERY FIRST HOUR: Mod: HCNC,NTX | Performed by: SURGERY

## 2023-01-31 PROCEDURE — 36000707: Mod: HCNC,NTX | Performed by: SURGERY

## 2023-01-31 PROCEDURE — 64415 NJX AA&/STRD BRCH PLXS IMG: CPT | Mod: 59,HCNC,LT,NTX | Performed by: SURGERY

## 2023-01-31 RX ORDER — LIDOCAINE HYDROCHLORIDE 10 MG/ML
1 INJECTION, SOLUTION EPIDURAL; INFILTRATION; INTRACAUDAL; PERINEURAL ONCE AS NEEDED
Status: DISCONTINUED | OUTPATIENT
Start: 2023-01-31 | End: 2023-01-31 | Stop reason: HOSPADM

## 2023-01-31 RX ORDER — TALC
6 POWDER (GRAM) TOPICAL NIGHTLY PRN
Status: DISCONTINUED | OUTPATIENT
Start: 2023-01-31 | End: 2023-01-31

## 2023-01-31 RX ORDER — HYDROCODONE BITARTRATE AND ACETAMINOPHEN 10; 325 MG/1; MG/1
1 TABLET ORAL EVERY 4 HOURS PRN
Status: DISCONTINUED | OUTPATIENT
Start: 2023-01-31 | End: 2023-01-31

## 2023-01-31 RX ORDER — LIDOCAINE HYDROCHLORIDE 20 MG/ML
INJECTION, SOLUTION EPIDURAL; INFILTRATION; INTRACAUDAL; PERINEURAL
Status: COMPLETED | OUTPATIENT
Start: 2023-01-31 | End: 2023-01-31

## 2023-01-31 RX ORDER — ONDANSETRON 8 MG/1
8 TABLET, ORALLY DISINTEGRATING ORAL EVERY 8 HOURS PRN
Status: DISCONTINUED | OUTPATIENT
Start: 2023-01-31 | End: 2023-01-31 | Stop reason: HOSPADM

## 2023-01-31 RX ORDER — LIDOCAINE HYDROCHLORIDE 20 MG/ML
INJECTION INTRAVENOUS
Status: DISCONTINUED | OUTPATIENT
Start: 2023-01-31 | End: 2023-01-31

## 2023-01-31 RX ORDER — LIDOCAINE HYDROCHLORIDE 10 MG/ML
INJECTION, SOLUTION EPIDURAL; INFILTRATION; INTRACAUDAL; PERINEURAL
Status: DISCONTINUED | OUTPATIENT
Start: 2023-01-31 | End: 2023-01-31 | Stop reason: HOSPADM

## 2023-01-31 RX ORDER — DEXMEDETOMIDINE HYDROCHLORIDE 100 UG/ML
INJECTION, SOLUTION INTRAVENOUS
Status: DISCONTINUED | OUTPATIENT
Start: 2023-01-31 | End: 2023-01-31

## 2023-01-31 RX ORDER — HYDROCODONE BITARTRATE AND ACETAMINOPHEN 5; 325 MG/1; MG/1
1 TABLET ORAL EVERY 4 HOURS PRN
Status: DISCONTINUED | OUTPATIENT
Start: 2023-01-31 | End: 2023-01-31

## 2023-01-31 RX ORDER — LIDOCAINE HYDROCHLORIDE 10 MG/ML
1 INJECTION, SOLUTION EPIDURAL; INFILTRATION; INTRACAUDAL; PERINEURAL ONCE
Status: COMPLETED | OUTPATIENT
Start: 2023-01-31 | End: 2023-01-31

## 2023-01-31 RX ORDER — ONDANSETRON 2 MG/ML
INJECTION INTRAMUSCULAR; INTRAVENOUS
Status: DISCONTINUED | OUTPATIENT
Start: 2023-01-31 | End: 2023-01-31

## 2023-01-31 RX ORDER — SODIUM CHLORIDE 9 MG/ML
INJECTION, SOLUTION INTRAVENOUS CONTINUOUS
Status: DISCONTINUED | OUTPATIENT
Start: 2023-01-31 | End: 2023-01-31 | Stop reason: HOSPADM

## 2023-01-31 RX ORDER — HALOPERIDOL 5 MG/ML
0.5 INJECTION INTRAMUSCULAR EVERY 10 MIN PRN
Status: DISCONTINUED | OUTPATIENT
Start: 2023-01-31 | End: 2023-01-31 | Stop reason: HOSPADM

## 2023-01-31 RX ORDER — SODIUM CHLORIDE 0.9 % (FLUSH) 0.9 %
10 SYRINGE (ML) INJECTION
Status: DISCONTINUED | OUTPATIENT
Start: 2023-01-31 | End: 2023-01-31 | Stop reason: HOSPADM

## 2023-01-31 RX ORDER — OXYCODONE HYDROCHLORIDE 5 MG/1
5 TABLET ORAL EVERY 4 HOURS PRN
Qty: 15 TABLET | Refills: 0 | Status: SHIPPED | OUTPATIENT
Start: 2023-01-31 | End: 2023-05-05

## 2023-01-31 RX ORDER — FAMOTIDINE 10 MG/ML
20 INJECTION INTRAVENOUS ONCE
Status: COMPLETED | OUTPATIENT
Start: 2023-01-31 | End: 2023-01-31

## 2023-01-31 RX ORDER — PROPOFOL 10 MG/ML
VIAL (ML) INTRAVENOUS
Status: DISCONTINUED | OUTPATIENT
Start: 2023-01-31 | End: 2023-01-31

## 2023-01-31 RX ORDER — ACETAMINOPHEN 325 MG/1
650 TABLET ORAL EVERY 8 HOURS PRN
Status: DISCONTINUED | OUTPATIENT
Start: 2023-01-31 | End: 2023-01-31

## 2023-01-31 RX ORDER — PROPOFOL 10 MG/ML
VIAL (ML) INTRAVENOUS CONTINUOUS PRN
Status: DISCONTINUED | OUTPATIENT
Start: 2023-01-31 | End: 2023-01-31

## 2023-01-31 RX ORDER — CLINDAMYCIN PHOSPHATE 900 MG/50ML
INJECTION, SOLUTION INTRAVENOUS
Status: DISCONTINUED | OUTPATIENT
Start: 2023-01-31 | End: 2023-01-31

## 2023-01-31 RX ORDER — ACETAMINOPHEN 325 MG/1
650 TABLET ORAL EVERY 4 HOURS PRN
Status: DISCONTINUED | OUTPATIENT
Start: 2023-01-31 | End: 2023-01-31

## 2023-01-31 RX ORDER — FENTANYL CITRATE 50 UG/ML
INJECTION, SOLUTION INTRAMUSCULAR; INTRAVENOUS
Status: DISCONTINUED | OUTPATIENT
Start: 2023-01-31 | End: 2023-01-31

## 2023-01-31 RX ORDER — FENTANYL CITRATE 50 UG/ML
25 INJECTION, SOLUTION INTRAMUSCULAR; INTRAVENOUS EVERY 5 MIN PRN
Status: DISCONTINUED | OUTPATIENT
Start: 2023-01-31 | End: 2023-01-31 | Stop reason: HOSPADM

## 2023-01-31 RX ORDER — MIDAZOLAM HYDROCHLORIDE 1 MG/ML
INJECTION, SOLUTION INTRAMUSCULAR; INTRAVENOUS
Status: DISCONTINUED | OUTPATIENT
Start: 2023-01-31 | End: 2023-01-31

## 2023-01-31 RX ORDER — HEPARIN SODIUM 1000 [USP'U]/ML
INJECTION, SOLUTION INTRAVENOUS; SUBCUTANEOUS
Status: DISCONTINUED | OUTPATIENT
Start: 2023-01-31 | End: 2023-01-31 | Stop reason: HOSPADM

## 2023-01-31 RX ADMIN — PROPOFOL 40 MCG/KG/MIN: 10 INJECTION, EMULSION INTRAVENOUS at 07:01

## 2023-01-31 RX ADMIN — LIDOCAINE HYDROCHLORIDE 50 MG: 20 INJECTION INTRAVENOUS at 07:01

## 2023-01-31 RX ADMIN — Medication 20 MG: at 08:01

## 2023-01-31 RX ADMIN — FAMOTIDINE 20 MG: 10 INJECTION, SOLUTION INTRAVENOUS at 02:01

## 2023-01-31 RX ADMIN — GLYCOPYRROLATE 0.1 MG: 0.2 INJECTION, SOLUTION INTRAMUSCULAR; INTRAVENOUS at 07:01

## 2023-01-31 RX ADMIN — MIDAZOLAM HYDROCHLORIDE 2 MG: 1 INJECTION, SOLUTION INTRAMUSCULAR; INTRAVENOUS at 07:01

## 2023-01-31 RX ADMIN — LIDOCAINE HYDROCHLORIDE 10 MG: 10 INJECTION, SOLUTION EPIDURAL; INFILTRATION; INTRACAUDAL; PERINEURAL at 06:01

## 2023-01-31 RX ADMIN — SODIUM CHLORIDE: 0.9 INJECTION, SOLUTION INTRAVENOUS at 07:01

## 2023-01-31 RX ADMIN — FENTANYL CITRATE 50 MCG: 50 INJECTION, SOLUTION INTRAMUSCULAR; INTRAVENOUS at 07:01

## 2023-01-31 RX ADMIN — PROPOFOL 25 MCG/KG/MIN: 10 INJECTION, EMULSION INTRAVENOUS at 06:01

## 2023-01-31 RX ADMIN — ONDANSETRON 4 MG: 2 INJECTION INTRAMUSCULAR; INTRAVENOUS at 08:01

## 2023-01-31 RX ADMIN — LIDOCAINE HYDROCHLORIDE 40 ML: 20 INJECTION, SOLUTION INTRAVENOUS at 07:01

## 2023-01-31 RX ADMIN — DEXMEDETOMIDINE HYDROCHLORIDE 8 MCG: 100 INJECTION, SOLUTION, CONCENTRATE INTRAVENOUS at 09:01

## 2023-01-31 RX ADMIN — DEXMEDETOMIDINE HYDROCHLORIDE 12 MCG: 100 INJECTION, SOLUTION, CONCENTRATE INTRAVENOUS at 08:01

## 2023-01-31 RX ADMIN — FENTANYL CITRATE 25 MCG: 50 INJECTION, SOLUTION INTRAMUSCULAR; INTRAVENOUS at 08:01

## 2023-01-31 RX ADMIN — TACROLIMUS 900 MG: 0.5 CAPSULE ORAL at 07:01

## 2023-01-31 RX ADMIN — Medication 10 MG: at 08:01

## 2023-01-31 NOTE — PLAN OF CARE
Patient stable and alert. Patients chest pain is better and she is ready to go home. Vital signs stable. MD made aware and is okay with patient going home. Preparing for discharge home.

## 2023-01-31 NOTE — TRANSFER OF CARE
"Anesthesia Transfer of Care Note    Patient: Kathie Quezada    Procedure(s) Performed: Procedure(s) (LRB):  TRANSPOSITION, VEIN, BASILIC (Left)    Patient location: PACU    Anesthesia Type: regional    Transport from OR: Transported from OR on 6-10 L/min O2 by face mask with adequate spontaneous ventilation    Post pain: adequate analgesia    Post assessment: no apparent anesthetic complications    Post vital signs: stable    Level of consciousness: sedated    Nausea/Vomiting: no nausea/vomiting    Complications: none    Transfer of care protocol was followed      Last vitals:   Visit Vitals  BP (!) 190/86 (BP Location: Right arm, Patient Position: Lying)   Pulse 70   Temp 36.6 °C (97.9 °F) (Oral)   Resp 18   Ht 5' 7" (1.702 m)   Wt 115.2 kg (254 lb)   SpO2 96%   Breastfeeding No   BMI 39.78 kg/m²     "

## 2023-01-31 NOTE — OR NURSING
Received report from ALIVIA Lloyd for lunch relief, patient states it feels like there is something sitting on her chest, Dr. Lindsey with Anesthesia notified. She advised to contact the regional block team. Contacted and they are to evaluate at bedside. Will continue to monitor, VSS at this time.

## 2023-01-31 NOTE — PLAN OF CARE
Patient has no pre-op orders.  Patient needs Anesthesia and Surgical consents.     Patient is a very difficult IV stick. 4 sticks =22G

## 2023-01-31 NOTE — SUBJECTIVE & OBJECTIVE
Medications Prior to Admission   Medication Sig Dispense Refill Last Dose    amLODIPine (NORVASC) 10 MG tablet Take 1 tablet (10 mg total) by mouth nightly. 90 tablet 3 1/30/2023 at 1900    carvediloL (COREG) 25 MG tablet Take 1 tablet (25 mg total) by mouth 2 (two) times daily with meals. 180 tablet 3 1/31/2023 at 0430    cholecalciferol, vitamin D3, 125 mcg (5,000 unit) Tab Take 1 tablet (5,000 Units total) by mouth once daily. 90 tablet 3 1/30/2023 at 1000    EScitalopram oxalate (LEXAPRO) 10 MG tablet Take 1 tablet (10 mg total) by mouth once daily. 90 tablet 3 1/29/2023    furosemide (LASIX) 40 MG tablet Take 1 tablet (40 mg total) by mouth 2 (two) times a day. 180 tablet 3 1/30/2023 at 1000    sodium bicarbonate 325 MG tablet Take 2 tablets (650 mg total) by mouth 2 (two) times daily. 120 tablet 6 1/29/2023    SYNTHROID 200 mcg tablet Take 1 tablet (200 mcg total) by mouth before breakfast. 90 tablet 3 1/30/2023 at 0800    traZODone (DESYREL) 50 MG tablet Take 1 to 2 tablets ( mg total) by mouth nightly as needed for Insomnia. 120 tablet 3 1/30/2023 at 1900    calcitRIOL (ROCALTROL) 0.25 MCG Cap Take 1 capsule (0.25 mcg total) by mouth once daily. 90 capsule 1     levocetirizine (XYZAL) 5 MG tablet Take 1 tablet (5 mg total) by mouth daily as needed for Allergies. 90 tablet 3 More than a month    montelukast (SINGULAIR) 5 MG chewable tablet Take 1 tablet (5 mg total) by mouth every evening. 30 tablet 0 More than a month       Review of patient's allergies indicates:   Allergen Reactions    Atorvastatin Other (See Comments)     Statin induced myalgia    Mushroom Hives    Peanut Anaphylaxis    Bactrim [sulfamethoxazole-trimethoprim] Hives    Gabapentin      Pruritis     Iodine     Peanut butter flavor     Penicillins Hives    Shellfish containing products Itching    Sulfa (sulfonamide antibiotics) Hives       Past Medical History:   Diagnosis Date    Acute respiratory failure with hypoxia and hypercapnia  05/26/2019    ATN (acute tubular necrosis) 02/08/2019    Chronic diastolic heart failure 02/07/2019    2-8-19 Mild left atrial enlargement. Mild left ventricular enlargement. Normal left ventricular systolic function. The estimated ejection fraction is 55% Indeterminate left ventricular diastolic function. Normal right ventricular systolic function. Mild mitral regurgitation. Mild to moderate tricuspid regurgitation. The estimated PA systolic pressure is 32 mm Hg Normal central venous pressure (3 m    CKD (chronic kidney disease) stage 4, GFR 15-29 ml/min 05/16/2018    CKD stage G3a/A3, GFR 45-59 and albumin creatinine ratio >300 mg/g 05/16/2018    CKD stage G3b/A3, GFR 30-44 and albumin creatinine ratio >300 mg/g 05/16/2018    Closed compression fracture of L2 lumbar vertebra 05/24/2019    Closed compression fracture of L2 lumbar vertebra 05/24/2019     IMO Regulatory Load October 2019    Controlled type 2 diabetes with retinopathy 04/09/2018    COVID-19 virus infection 7-1-2021 07/01/2021    CVA (cerebral vascular accident) 2017    Diabetic polyneuropathy associated with type 2 diabetes mellitus 12/04/2020    Encounter for blood transfusion     Essential hypertension 04/09/2018    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  7-31-19 Factor XI Activity 55 - 145 % 96       Gastroesophageal reflux disease without esophagitis 05/24/2019    Generalized anxiety disorder 11/08/2019    GERD (gastroesophageal reflux disease)     GIB (gastrointestinal bleeding) 10/18/2018    History of CVA (cerebrovascular accident) without residual deficits 11/27/2022    History of hepatitis C     s/p succesful Rx w/ [facility]  - SVR12 (cure) 2016    Hypertensive retinopathy, bilateral 12/10/2019    Mild nonproliferative diabetic retinopathy 05/02/2018    Mixed hyperlipidemia 04/09/2018    Myalgia due to statin 09/07/2022    Normocytic anemia 01/14/2022    NS (nuclear sclerosis), bilateral 12/10/2019    PNA (pneumonia)  05/28/2019    Postoperative hypothyroidism     Renovascular hypertension 04/09/2018    S/P kyphoplasty 08/14/2019 8/14/19: L2 kyphoplasty with Dr. Kwon     Slow transit constipation 05/27/2019    Type 2 diabetes mellitus with circulatory disorder, with long-term current use of insulin 04/09/2018    Type 2 diabetes mellitus with stage 3 chronic kidney disease, without long-term current use of insulin 04/09/2018    Type 2 diabetes mellitus with stage 3a chronic kidney disease, without long-term current use of insulin 03/03/2021    Type 2 diabetes mellitus with stage 3b chronic kidney disease, without long-term current use of insulin 09/07/2022     Past Surgical History:   Procedure Laterality Date    AV FISTULA PLACEMENT Left 10/27/2022    Procedure: CREATION, AV FISTULA;  Surgeon: Tirso Tavera MD;  [address];  Service: Peripheral Vascular;  Laterality: Left;  Brachio basilic 1st stage     BACK SURGERY  2017    CHOLECYSTECTOMY  2015    [facility]    COLONOSCOPY  11/13/2015    Dr Chino torres prep. internal hemorrhoids. diverticulosis of sigmois. NO polyps. repeat due 2025    FISTULOGRAM N/A 12/16/2022    Procedure: Fistulogram;  Surgeon: Tirso Tavera MD;  Location: [facility] CATH LAB;  Service: Peripheral Vascular;  Laterality: N/A;    FIXATION KYPHOPLASTY Bilateral 08/14/2019    Procedure: L2 kyphoplasty Fluoro Globus;  Surgeon: Jeremie Kwon DO;  Location: [facility] OR;  Service: Neurosurgery;  Laterality: Bilateral;  GLOBUS MACRINA WINSLOW [phone] TEXTED HER @ 10:54AM ON 7-30-19  PRE-OP-BY RN  8-7-2019    HYSTERECTOMY      OOPHORECTOMY  1996    one    PERCUTANEOUS TRANSLUMINAL ANGIOPLASTY OF ARTERIOVENOUS FISTULA Left 12/16/2022    Procedure: PTA, AV FISTULA;  Surgeon: Tirso Tavera MD;  Location: [facility] CATH LAB;  Service: Peripheral Vascular;  Laterality: Left;    THYROIDECTOMY  2016    at [facility] for thyroid nodule    TONSILLECTOMY       Family History       Problem Relation (Age of Onset)     Aneurysm Brother    Coronary artery disease Mother    Diabetes type II Mother    Heart disease Mother    Hypertension Mother, Father    Prostate cancer Father    Stroke Mother          Tobacco Use    Smoking status: Never    Smokeless tobacco: Never   Substance and Sexual Activity    Alcohol use: Not Currently    Drug use: No    Sexual activity: Not Currently     Review of Systems   All other systems reviewed and are negative.  Objective:     Vital Signs (Most Recent):  Temp: 97.9 °F (36.6 °C) (01/31/23 0610)  Pulse: 70 (01/31/23 0610)  Resp: 18 (01/31/23 0610)  BP: (!) 190/86 (01/31/23 0610)  SpO2: 96 % (01/31/23 0610)   Vital Signs (24h Range):  Temp:  [97.9 °F (36.6 °C)] 97.9 °F (36.6 °C)  Pulse:  [70] 70  Resp:  [18] 18  SpO2:  [96 %] 96 %  BP: (190)/(86) 190/86     Weight: 115.2 kg (254 lb)  Body mass index is 39.78 kg/m².    Physical Exam  HENT:      Head: Normocephalic.      Mouth/Throat:      Mouth: Mucous membranes are moist.   Cardiovascular:      Rate and Rhythm: Normal rate.   Pulmonary:      Effort: Pulmonary effort is normal.   Abdominal:      Palpations: Abdomen is soft.   Musculoskeletal:      Cervical back: Neck supple.   Skin:     General: Skin is warm.   Neurological:      General: No focal deficit present.      Mental Status: She is alert.       Significant Labs:  All pertinent labs from the last 24 hours have been reviewed.    Significant Diagnostics:  I have reviewed and interpreted all pertinent imaging results/findings within the past 24 hours.  No results found in the last 24 hours.

## 2023-01-31 NOTE — TELEPHONE ENCOUNTER
----- Message from Werner Estrella Jr., MD sent at 12/11/2022  3:27 PM CST -----  Regarding: RE: ct review  Looks acceptable  ----- Message -----  From: Phyllis Chaparro RN  Sent: 12/7/2022   3:01 PM CST  To: Lynne Kirkpatrick RN, Werner Estrella Jr., MD  Subject: ct review                                        Hi Dr. Estrella,  Please review ct abd/pelvis in [facility] 7/30/22 and comment if favorable for transplant.     Respectfully,  Phyllis

## 2023-01-31 NOTE — ASSESSMENT & PLAN NOTE
62 y.o. female with a h/o DMII with retinopathy, HTN, stroke and obesity (BMI 40), and multiple AKIs the last was from urinary retention -- L 1st stage brachial artery/vein AVF for future AV access completed, here today for 2nd stage transposition     Plan:  NPO  Marked, Consented  Proceed to OR

## 2023-01-31 NOTE — HPI
62 y.o. female with a h/o DMII with retinopathy, HTN, stroke and obesity (BMI 40), and multiple AKIs the last was from urinary retention -- L 1st stage brachial artery/vein AVF for future AV access completed, here today for 2nd stage transposition

## 2023-01-31 NOTE — ANESTHESIA PROCEDURE NOTES
Left Supraclavicular Single Injection Block    Patient location during procedure: pre-op   Block not for primary anesthetic.  Reason for block: at surgeon's request and post-op pain management   Post-op Pain Location: Left arm   Start time: 1/31/2023 7:28 AM  Timeout: 1/31/2023 7:28 AM   End time: 1/31/2023 7:35 AM    Staffing  Authorizing Provider: Dheeraj North MD  Performing Provider: Chente Hill MD    Preanesthetic Checklist  Completed: patient identified, IV checked, site marked, risks and benefits discussed, surgical consent, monitors and equipment checked, pre-op evaluation and timeout performed  Peripheral Block  Patient position: supine  Prep: ChloraPrep  Patient monitoring: heart rate, cardiac monitor, continuous pulse ox, continuous capnometry and frequent blood pressure checks  Block type: supraclavicular  Laterality: left  Injection technique: single shot  Needle  Needle type: Stimuplex   Needle gauge: 22 G  Needle length: 2 in  Needle localization: anatomical landmarks and ultrasound guidance   -ultrasound image captured on disc.  Assessment  Injection assessment: negative aspiration, negative parasthesia and local visualized surrounding nerve  Paresthesia pain: none  Heart rate change: no  Slow fractionated injection: yes  Pain Tolerance: comfortable throughout block and no complaints  Medications:    Medications: lidocaine (PF) 20 mg/mL (2%) injection - Other   40 mL - 1/31/2023 7:35:00 AM    Additional Notes  Left supraclavicular single shot block performed under ultrasound guidance.  Administered 32cc of 2% lidocaine with epi.  Negative aspiration and visualization of local spread confirmed with ultrasound.   Left Intercostal brachial field block performed with 6cc of 2% lidocaine for additional coverage.    VSS. Patient tolerated well. No immediate complications.  CRNA present and monitored throughout procedure. See anesthesia flowsheet.

## 2023-01-31 NOTE — H&P
[facility] - Surgery ([facility])  Vascular Surgery  History and Physical     Patient Name: Kathie Quezada  MRN: [id number]  Admission Date: 1/31/2023  Code Status: Full Code   Attending Physician: Liang  Primary Care Physician: Elan Price MD    Subjective:     Chief Complaint/Reason for Admission: 2nd stage transposition     HPI:  62 y.o. female with a h/o DMII with retinopathy, HTN, stroke and obesity (BMI 40), and multiple AKIs the last was from urinary retention -- L 1st stage brachial artery/vein AVF for future AV access completed, here today for 2nd stage transposition         Medications Prior to Admission   Medication Sig Dispense Refill Last Dose    amLODIPine (NORVASC) 10 MG tablet Take 1 tablet (10 mg total) by mouth nightly. 90 tablet 3 1/30/2023 at 1900    carvediloL (COREG) 25 MG tablet Take 1 tablet (25 mg total) by mouth 2 (two) times daily with meals. 180 tablet 3 1/31/2023 at 0430    cholecalciferol, vitamin D3, 125 mcg (5,000 unit) Tab Take 1 tablet (5,000 Units total) by mouth once daily. 90 tablet 3 1/30/2023 at 1000    EScitalopram oxalate (LEXAPRO) 10 MG tablet Take 1 tablet (10 mg total) by mouth once daily. 90 tablet 3 1/29/2023    furosemide (LASIX) 40 MG tablet Take 1 tablet (40 mg total) by mouth 2 (two) times a day. 180 tablet 3 1/30/2023 at 1000    sodium bicarbonate 325 MG tablet Take 2 tablets (650 mg total) by mouth 2 (two) times daily. 120 tablet 6 1/29/2023    SYNTHROID 200 mcg tablet Take 1 tablet (200 mcg total) by mouth before breakfast. 90 tablet 3 1/30/2023 at 0800    traZODone (DESYREL) 50 MG tablet Take 1 to 2 tablets ( mg total) by mouth nightly as needed for Insomnia. 120 tablet 3 1/30/2023 at 1900    calcitRIOL (ROCALTROL) 0.25 MCG Cap Take 1 capsule (0.25 mcg total) by mouth once daily. 90 capsule 1     levocetirizine (XYZAL) 5 MG tablet Take 1 tablet (5 mg total) by mouth daily as needed for Allergies. 90 tablet 3 More than a month    montelukast  (SINGULAIR) 5 MG chewable tablet Take 1 tablet (5 mg total) by mouth every evening. 30 tablet 0 More than a month       Review of patient's allergies indicates:   Allergen Reactions    Atorvastatin Other (See Comments)     Statin induced myalgia    Mushroom Hives    Peanut Anaphylaxis    Bactrim [sulfamethoxazole-trimethoprim] Hives    Gabapentin      Pruritis     Iodine     Peanut butter flavor     Penicillins Hives    Shellfish containing products Itching    Sulfa (sulfonamide antibiotics) Hives       Past Medical History:   Diagnosis Date    Acute respiratory failure with hypoxia and hypercapnia 05/26/2019    ATN (acute tubular necrosis) 02/08/2019    Chronic diastolic heart failure 02/07/2019 2-8-19 Mild left atrial enlargement. Mild left ventricular enlargement. Normal left ventricular systolic function. The estimated ejection fraction is 55% Indeterminate left ventricular diastolic function. Normal right ventricular systolic function. Mild mitral regurgitation. Mild to moderate tricuspid regurgitation. The estimated PA systolic pressure is 32 mm Hg Normal central venous pressure (3 m    CKD (chronic kidney disease) stage 4, GFR 15-29 ml/min 05/16/2018    CKD stage G3a/A3, GFR 45-59 and albumin creatinine ratio >300 mg/g 05/16/2018    CKD stage G3b/A3, GFR 30-44 and albumin creatinine ratio >300 mg/g 05/16/2018    Closed compression fracture of L2 lumbar vertebra 05/24/2019    Closed compression fracture of L2 lumbar vertebra 05/24/2019     IMO Regulatory Load October 2019    Controlled type 2 diabetes with retinopathy 04/09/2018    COVID-19 virus infection 7-1-2021 07/01/2021    CVA (cerebral vascular accident) 2017    Diabetic polyneuropathy associated with type 2 diabetes mellitus 12/04/2020    Encounter for blood transfusion     Essential hypertension 04/09/2018    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  7-31-19 Factor XI Activity 55 - 145 % 96        Gastroesophageal reflux disease without esophagitis 05/24/2019    Generalized anxiety disorder 11/08/2019    GERD (gastroesophageal reflux disease)     GIB (gastrointestinal bleeding) 10/18/2018    History of CVA (cerebrovascular accident) without residual deficits 11/27/2022    History of hepatitis C     s/p succesful Rx w/ [facility]  - [id number] (cure) 2016    Hypertensive retinopathy, bilateral 12/10/2019    Mild nonproliferative diabetic retinopathy 05/02/2018    Mixed hyperlipidemia 04/09/2018    Myalgia due to statin 09/07/2022    Normocytic anemia 01/14/2022    NS (nuclear sclerosis), bilateral 12/10/2019    PNA (pneumonia) 05/28/2019    Postoperative hypothyroidism     Renovascular hypertension 04/09/2018    S/P kyphoplasty 08/14/2019 8/14/19: L2 kyphoplasty with Dr. Kwon     Slow transit constipation 05/27/2019    Type 2 diabetes mellitus with circulatory disorder, with long-term current use of insulin 04/09/2018    Type 2 diabetes mellitus with stage 3 chronic kidney disease, without long-term current use of insulin 04/09/2018    Type 2 diabetes mellitus with stage 3a chronic kidney disease, without long-term current use of insulin 03/03/2021    Type 2 diabetes mellitus with stage 3b chronic kidney disease, without long-term current use of insulin 09/07/2022     Past Surgical History:   Procedure Laterality Date    AV FISTULA PLACEMENT Left 10/27/2022    Procedure: CREATION, AV FISTULA;  Surgeon: Tirso Tavera MD;  Location: [facility] OR [address] [place] [address];  Service: Peripheral Vascular;  Laterality: Left;  Brachio basilic 1st stage     BACK SURGERY  2017    CHOLECYSTECTOMY  2015    [facility]    COLONOSCOPY  11/13/2015    Dr Chino torres prep. internal hemorrhoids. diverticulosis of sigmois. NO polyps. repeat due 2025    FISTULOGRAM N/A 12/16/2022    Procedure: Fistulogram;  Surgeon: Tirso Tavera MD;  Location: [facility] CATH LAB;  Service: Peripheral Vascular;  Laterality: N/A;    FIXATION  KYPHOPLASTY Bilateral 08/14/2019    Procedure: L2 kyphoplasty Fluoro Globus;  Surgeon: Jeremie Kwon DO;  Location: [facility] OR;  Service: Neurosurgery;  Laterality: Bilateral;  CROW [place]OW [phone] TEXTED HER @ 10:54AM ON 7-30-19  PRE-OP-BY RN  8-7-2019    HYSTERECTOMY      OOPHORECTOMY  1996    one    PERCUTANEOUS TRANSLUMINAL ANGIOPLASTY OF ARTERIOVENOUS FISTULA Left 12/16/2022    Procedure: PTA, AV FISTULA;  Surgeon: Tirso Tavera MD;  Location: [facility] CATH LAB;  Service: Peripheral Vascular;  Laterality: Left;    THYROIDECTOMY  2016    at [facility] for thyroid nodule    TONSILLECTOMY       Family History       Problem Relation (Age of Onset)    Aneurysm Brother    Coronary artery disease Mother    Diabetes type II Mother    Heart disease Mother    Hypertension Mother, Father    Prostate cancer Father    Stroke Mother          Tobacco Use    Smoking status: Never    Smokeless tobacco: Never   Substance and Sexual Activity    Alcohol use: Not Currently    Drug use: No    Sexual activity: Not Currently     Review of Systems   All other systems reviewed and are negative.  Objective:     Vital Signs (Most Recent):  Temp: 97.9 °F (36.6 °C) (01/31/23 0610)  Pulse: 70 (01/31/23 0610)  Resp: 18 (01/31/23 0610)  BP: (!) 190/86 (01/31/23 0610)  SpO2: 96 % (01/31/23 0610)   Vital Signs (24h Range):  Temp:  [97.9 °F (36.6 °C)] 97.9 °F (36.6 °C)  Pulse:  [70] 70  Resp:  [18] 18  SpO2:  [96 %] 96 %  BP: (190)/(86) 190/86     Weight: 115.2 kg (254 lb)  Body mass index is 39.78 kg/m².    Physical Exam  HENT:      Head: Normocephalic.      Mouth/Throat:      Mouth: Mucous membranes are moist.   Cardiovascular:      Rate and Rhythm: Normal rate.   Pulmonary:      Effort: Pulmonary effort is normal.   Abdominal:      Palpations: Abdomen is soft.   Musculoskeletal:      Cervical back: Neck supple.   Skin:     General: Skin is warm.   Neurological:      General: No focal deficit present.      Mental Status: She is  alert.       Significant Labs:  All pertinent labs from the last 24 hours have been reviewed.    Significant Diagnostics:  I have reviewed and interpreted all pertinent imaging results/findings within the past 24 hours.  No results found in the last 24 hours.      Assessment and Plan:     S/P arteriovenous (AV) fistula creation  62 y.o. female with a h/o DMII with retinopathy, HTN, stroke and obesity (BMI 40), and multiple AKIs the last was from urinary retention -- L 1st stage brachial artery/vein AVF for future AV access completed, here today for 2nd stage transposition     Plan:  NPO  Marked, Consented  Proceed to OR        Kumar Pennington MD  Vascular Surgery  [facility] - Surgery (2nd Fl)

## 2023-01-31 NOTE — PLAN OF CARE
Patient stable and vital signs stable. No complications. Dressing to left arm clean, dry and intact. Patients daughter to pickup prescription from pharmacy. Continuing to monitor.

## 2023-01-31 NOTE — PLAN OF CARE
Patient stable and alert. Vital signs stable besides low O2 level. Dr. Young to come to bedside and assess patient to be able to be discharged to home. Continuing to monitor.

## 2023-01-31 NOTE — ANESTHESIA POSTPROCEDURE EVALUATION
Anesthesia Post Evaluation    Patient: Kathie Quezada    Procedure(s) Performed: Procedure(s) (LRB):  TRANSPOSITION, VEIN, BASILIC (Left)    Final Anesthesia Type: general      Patient location during evaluation: PACU  Patient participation: Yes- Able to Participate  Level of consciousness: awake and alert and oriented  Post-procedure vital signs: reviewed and stable  Pain management: adequate  Airway patency: patent    PONV status at discharge: No PONV  Anesthetic complications: no      Cardiovascular status: hemodynamically stable  Respiratory status: nasal cannula  Hydration status: euvolemic  Follow-up not needed.          Vitals Value Taken Time   /67 01/31/23 1303   Temp 36.7 °C (98 °F) 01/31/23 1030   Pulse 68 01/31/23 1308   Resp 27 01/31/23 1308   SpO2 89 % 01/31/23 1308   Vitals shown include unvalidated device data.      No case tracking events are documented in the log.      Pain/Neda Score: Neda Score: 10 (1/31/2023 12:50 PM)

## 2023-01-31 NOTE — BRIEF OP NOTE
Brief Operative Note  Date: 01/31/2023    Pre-op Diagnosis:  S/P arteriovenous (AV) fistula creation [Z98.890]    Post-op Diagnosis:  Same    Procedure(s):  Left brachial artery/basilic vein AVF transposition / superficialization    Surgeon: JASON Tavera MD [facility]    Assistant: Ward Cadena MD - Resident - Assisting    Anesthesia: Regional    Findings/Key Components:  Strong thrill in L BVT AVF, 20cm dissected  Note, morbidly obese arm (BMI 40)    EBL: < 10 ml      Specimens (From admission, onward)      None          I attest to being present for the procedure and performing the case.  Tirso Tavera MD [facility] FACS  Discharge Note  SUMMARY    Admit Date: 1/31/2023    Attending Physician: Tirso Tavera MD FACS    Discharge Physician: Tirso Tavera MD FACS    Discharge Date: 01/31/2023    Final Diagnosis: S/P arteriovenous (AV) fistula creation [Z98.890]    Outcome of Treatment: Successful L BVT AVF    Disposition: Home or self-care    Patient Instructions:   Current Discharge Medication List        CONTINUE these medications which have NOT CHANGED    Details   amLODIPine (NORVASC) 10 MG tablet Take 1 tablet (10 mg total) by mouth nightly.  Qty: 90 tablet, Refills: 3    Comments: .  Associated Diagnoses: Chronic diastolic heart failure; Renovascular hypertension      carvediloL (COREG) 25 MG tablet Take 1 tablet (25 mg total) by mouth 2 (two) times daily with meals.  Qty: 180 tablet, Refills: 3    Comments: .  Associated Diagnoses: Chronic diastolic heart failure; Renovascular hypertension      cholecalciferol, vitamin D3, 125 mcg (5,000 unit) Tab Take 1 tablet (5,000 Units total) by mouth once daily.  Qty: 90 tablet, Refills: 3    Associated Diagnoses: Vitamin D deficiency      EScitalopram oxalate (LEXAPRO) 10 MG tablet Take 1 tablet (10 mg total) by mouth once daily.  Qty: 90 tablet, Refills: 3    Associated Diagnoses: Generalized anxiety disorder      furosemide (LASIX) 40 MG tablet Take 1 tablet (40 mg total) by  mouth 2 (two) times a day.  Qty: 180 tablet, Refills: 3    Associated Diagnoses: Chronic diastolic heart failure; Chronic kidney disease (CKD), stage V      sodium bicarbonate 325 MG tablet Take 2 tablets (650 mg total) by mouth 2 (two) times daily.  Qty: 120 tablet, Refills: 6      SYNTHROID 200 mcg tablet Take 1 tablet (200 mcg total) by mouth before breakfast.  Qty: 90 tablet, Refills: 3    Associated Diagnoses: Postoperative hypothyroidism      traZODone (DESYREL) 50 MG tablet Take 1 to 2 tablets ( mg total) by mouth nightly as needed for Insomnia.  Qty: 120 tablet, Refills: 3    Associated Diagnoses: Generalized anxiety disorder      calcitRIOL (ROCALTROL) 0.25 MCG Cap Take 1 capsule (0.25 mcg total) by mouth once daily.  Qty: 90 capsule, Refills: 1    Associated Diagnoses: Secondary hyperparathyroidism of renal origin      levocetirizine (XYZAL) 5 MG tablet Take 1 tablet (5 mg total) by mouth daily as needed for Allergies.  Qty: 90 tablet, Refills: 3    Associated Diagnoses: Chronic allergic rhinitis      montelukast (SINGULAIR) 5 MG chewable tablet Take 1 tablet (5 mg total) by mouth every evening.  Qty: 30 tablet, Refills: 0    Associated Diagnoses: Chronic allergic rhinitis             Diet:  Resume pre-operative diet    Activity:  Ad oli    Follow-up:  Follow-up in clinic with Dr Tavera within 4-6 weeks; please call clinic nurse at

## 2023-01-31 NOTE — PLAN OF CARE
Daughter concerned about swelling located at the site of the nerve block. Dr. North, with regional anesthesia, made aware of possible hematoma/swelling at insertion site of nerve block performed in the OR. Continuing to monitor. Patient on 2L Nasal cannula.

## 2023-01-31 NOTE — PLAN OF CARE
Patient on room air. O2 sat 92% and O2 sat is 94% when using the Incentive spirometer. Patient denies having sleep apnea. No distress noted. Vital signs stable. Daughter at bedside. Continuing to monitor.

## 2023-01-31 NOTE — PLAN OF CARE
Dr. Gannon at bedside assessing patient. Patient informed its swelling/fluid accumulation from the nerve block. Patient stable at this time.

## 2023-02-01 ENCOUNTER — TELEPHONE (OUTPATIENT)
Dept: VASCULAR SURGERY | Facility: CLINIC | Age: 63
End: 2023-02-01
Payer: MEDICARE

## 2023-02-01 ENCOUNTER — NURSE TRIAGE (OUTPATIENT)
Dept: ADMINISTRATIVE | Facility: CLINIC | Age: 63
End: 2023-02-01
Payer: MEDICARE

## 2023-02-01 ENCOUNTER — HOSPITAL ENCOUNTER (INPATIENT)
Facility: HOSPITAL | Age: 63
LOS: 9 days | Discharge: HOME HEALTH | DRG: 189 | End: 2023-02-10
Attending: EMERGENCY MEDICINE | Admitting: HOSPITALIST
Payer: MEDICARE

## 2023-02-01 DIAGNOSIS — J30.9 CHRONIC ALLERGIC RHINITIS: Chronic | ICD-10-CM

## 2023-02-01 DIAGNOSIS — R50.9 FEVER: ICD-10-CM

## 2023-02-01 DIAGNOSIS — I50.32 CHRONIC DIASTOLIC HEART FAILURE: Chronic | ICD-10-CM

## 2023-02-01 DIAGNOSIS — N18.5 CHRONIC KIDNEY DISEASE (CKD), STAGE V: Chronic | ICD-10-CM

## 2023-02-01 DIAGNOSIS — J96.01 ACUTE HYPOXEMIC RESPIRATORY FAILURE: ICD-10-CM

## 2023-02-01 DIAGNOSIS — N18.5 TYPE 2 DIABETES MELLITUS WITH STAGE 5 CHRONIC KIDNEY DISEASE NOT ON CHRONIC DIALYSIS, WITHOUT LONG-TERM CURRENT USE OF INSULIN: Primary | Chronic | ICD-10-CM

## 2023-02-01 DIAGNOSIS — I27.20 PULMONARY HYPERTENSION: ICD-10-CM

## 2023-02-01 DIAGNOSIS — I15.0 RENOVASCULAR HYPERTENSION: ICD-10-CM

## 2023-02-01 DIAGNOSIS — Z75.8 DISCHARGE PLANNING ISSUES: ICD-10-CM

## 2023-02-01 DIAGNOSIS — I77.0 A-V FISTULA: ICD-10-CM

## 2023-02-01 DIAGNOSIS — R09.02 HYPOXIA: ICD-10-CM

## 2023-02-01 DIAGNOSIS — E11.22 TYPE 2 DIABETES MELLITUS WITH STAGE 5 CHRONIC KIDNEY DISEASE NOT ON CHRONIC DIALYSIS, WITHOUT LONG-TERM CURRENT USE OF INSULIN: Primary | Chronic | ICD-10-CM

## 2023-02-01 DIAGNOSIS — R06.02 SHORTNESS OF BREATH: ICD-10-CM

## 2023-02-01 PROBLEM — E89.0 POSTOPERATIVE HYPOTHYROIDISM: Chronic | Status: ACTIVE | Noted: 2018-04-09

## 2023-02-01 PROBLEM — R50.82 POSTOPERATIVE FEVER: Status: ACTIVE | Noted: 2023-02-01

## 2023-02-01 PROBLEM — F41.1 GENERALIZED ANXIETY DISORDER: Chronic | Status: ACTIVE | Noted: 2019-11-08

## 2023-02-01 PROBLEM — E11.42 DIABETIC POLYNEUROPATHY ASSOCIATED WITH TYPE 2 DIABETES MELLITUS: Chronic | Status: ACTIVE | Noted: 2020-12-04

## 2023-02-01 LAB
ALBUMIN SERPL BCP-MCNC: 3.4 G/DL (ref 3.5–5.2)
ALLENS TEST: ABNORMAL
ALP SERPL-CCNC: 69 U/L (ref 55–135)
ALT SERPL W/O P-5'-P-CCNC: 5 U/L (ref 10–44)
ANION GAP SERPL CALC-SCNC: 11 MMOL/L (ref 8–16)
AST SERPL-CCNC: 22 U/L (ref 10–40)
BACTERIA #/AREA URNS AUTO: ABNORMAL /HPF
BASOPHILS # BLD AUTO: 0.08 K/UL (ref 0–0.2)
BASOPHILS NFR BLD: 0.6 % (ref 0–1.9)
BILIRUB SERPL-MCNC: 0.3 MG/DL (ref 0.1–1)
BILIRUB UR QL STRIP: NEGATIVE
BUN SERPL-MCNC: 33 MG/DL (ref 6–20)
BUN SERPL-MCNC: 45 MG/DL (ref 6–30)
CALCIUM SERPL-MCNC: 7.7 MG/DL (ref 8.7–10.5)
CHLORIDE SERPL-SCNC: 108 MMOL/L (ref 95–110)
CHLORIDE SERPL-SCNC: 109 MMOL/L (ref 95–110)
CLARITY UR REFRACT.AUTO: ABNORMAL
CO2 SERPL-SCNC: 18 MMOL/L (ref 23–29)
COLOR UR AUTO: YELLOW
CREAT SERPL-MCNC: 4.4 MG/DL (ref 0.5–1.4)
CREAT SERPL-MCNC: 4.8 MG/DL (ref 0.5–1.4)
DIFFERENTIAL METHOD: ABNORMAL
EOSINOPHIL # BLD AUTO: 0.1 K/UL (ref 0–0.5)
EOSINOPHIL NFR BLD: 0.7 % (ref 0–8)
ERYTHROCYTE [DISTWIDTH] IN BLOOD BY AUTOMATED COUNT: 14.8 % (ref 11.5–14.5)
EST. GFR  (NO RACE VARIABLE): 10.9 ML/MIN/1.73 M^2
GLUCOSE SERPL-MCNC: 85 MG/DL (ref 70–110)
GLUCOSE SERPL-MCNC: 87 MG/DL (ref 70–110)
GLUCOSE UR QL STRIP: NEGATIVE
HCT VFR BLD AUTO: 30.6 % (ref 37–48.5)
HCT VFR BLD CALC: 29 %PCV (ref 36–54)
HGB BLD-MCNC: 8.9 G/DL (ref 12–16)
HGB UR QL STRIP: ABNORMAL
HYALINE CASTS UR QL AUTO: 0 /LPF
IMM GRANULOCYTES # BLD AUTO: 0.09 K/UL (ref 0–0.04)
IMM GRANULOCYTES NFR BLD AUTO: 0.7 % (ref 0–0.5)
INFLUENZA A, MOLECULAR: NOT DETECTED
INFLUENZA B, MOLECULAR: NOT DETECTED
KETONES UR QL STRIP: NEGATIVE
LDH SERPL L TO P-CCNC: 0.4 MMOL/L (ref 0.5–2.2)
LEUKOCYTE ESTERASE UR QL STRIP: NEGATIVE
LYMPHOCYTES # BLD AUTO: 2.3 K/UL (ref 1–4.8)
LYMPHOCYTES NFR BLD: 16.9 % (ref 18–48)
MCH RBC QN AUTO: 24.4 PG (ref 27–31)
MCHC RBC AUTO-ENTMCNC: 29.1 G/DL (ref 32–36)
MCV RBC AUTO: 84 FL (ref 82–98)
MICROSCOPIC COMMENT: ABNORMAL
MONOCYTES # BLD AUTO: 1.2 K/UL (ref 0.3–1)
MONOCYTES NFR BLD: 8.7 % (ref 4–15)
NEUTROPHILS # BLD AUTO: 9.9 K/UL (ref 1.8–7.7)
NEUTROPHILS NFR BLD: 72.4 % (ref 38–73)
NITRITE UR QL STRIP: NEGATIVE
NRBC BLD-RTO: 0 /100 WBC
PH UR STRIP: 5 [PH] (ref 5–8)
PLATELET # BLD AUTO: 170 K/UL (ref 150–450)
PMV BLD AUTO: 10.9 FL (ref 9.2–12.9)
POC IONIZED CALCIUM: 0.96 MMOL/L (ref 1.06–1.42)
POC TCO2 (MEASURED): 22 MMOL/L (ref 23–29)
POTASSIUM BLD-SCNC: 5.4 MMOL/L (ref 3.5–5.1)
POTASSIUM SERPL-SCNC: 5.6 MMOL/L (ref 3.5–5.1)
PROT SERPL-MCNC: 7.4 G/DL (ref 6–8.4)
PROT UR QL STRIP: ABNORMAL
RBC # BLD AUTO: 3.65 M/UL (ref 4–5.4)
RBC #/AREA URNS AUTO: 1 /HPF (ref 0–4)
RSV AG BY MOLECULAR METHOD: NOT DETECTED
SAMPLE: ABNORMAL
SAMPLE: ABNORMAL
SARS-COV-2 RNA RESP QL NAA+PROBE: NOT DETECTED
SITE: ABNORMAL
SODIUM BLD-SCNC: 136 MMOL/L (ref 136–145)
SODIUM SERPL-SCNC: 138 MMOL/L (ref 136–145)
SP GR UR STRIP: 1.01 (ref 1–1.03)
SQUAMOUS #/AREA URNS AUTO: 13 /HPF
TROPONIN I SERPL DL<=0.01 NG/ML-MCNC: 0.01 NG/ML (ref 0–0.03)
URN SPEC COLLECT METH UR: ABNORMAL
VWF:RCO ACT/NOR PPP PL AGG: 48 % (ref 55–200)
WBC # BLD AUTO: 13.64 K/UL (ref 3.9–12.7)
WBC #/AREA URNS AUTO: 22 /HPF (ref 0–5)

## 2023-02-01 PROCEDURE — 93010 ELECTROCARDIOGRAM REPORT: CPT | Mod: HCNC,NTX,, | Performed by: INTERNAL MEDICINE

## 2023-02-01 PROCEDURE — 87086 URINE CULTURE/COLONY COUNT: CPT | Mod: HCNC,NTX | Performed by: EMERGENCY MEDICINE

## 2023-02-01 PROCEDURE — 83605 ASSAY OF LACTIC ACID: CPT | Mod: HCNC,NTX

## 2023-02-01 PROCEDURE — 99285 EMERGENCY DEPT VISIT HI MDM: CPT | Mod: HCNC,NTX,CS, | Performed by: EMERGENCY MEDICINE

## 2023-02-01 PROCEDURE — 85025 COMPLETE CBC W/AUTO DIFF WBC: CPT | Mod: HCNC,NTX | Performed by: EMERGENCY MEDICINE

## 2023-02-01 PROCEDURE — 82565 ASSAY OF CREATININE: CPT | Mod: HCNC,NTX

## 2023-02-01 PROCEDURE — 99900031 HC PATIENT EDUCATION (STAT): Mod: HCNC,NTX

## 2023-02-01 PROCEDURE — 99285 PR EMERGENCY DEPT VISIT,LEVEL V: ICD-10-PCS | Mod: HCNC,NTX,CS, | Performed by: EMERGENCY MEDICINE

## 2023-02-01 PROCEDURE — 25000003 PHARM REV CODE 250: Mod: HCNC,NTX | Performed by: HOSPITALIST

## 2023-02-01 PROCEDURE — 87040 BLOOD CULTURE FOR BACTERIA: CPT | Mod: HCNC,NTX | Performed by: EMERGENCY MEDICINE

## 2023-02-01 PROCEDURE — 12000002 HC ACUTE/MED SURGE SEMI-PRIVATE ROOM: Mod: HCNC,NTX

## 2023-02-01 PROCEDURE — 99285 EMERGENCY DEPT VISIT HI MDM: CPT | Mod: 25,HCNC,NTX

## 2023-02-01 PROCEDURE — 93005 ELECTROCARDIOGRAM TRACING: CPT | Mod: HCNC,NTX

## 2023-02-01 PROCEDURE — 82962 GLUCOSE BLOOD TEST: CPT | Mod: HCNC,NTX

## 2023-02-01 PROCEDURE — 93010 EKG 12-LEAD: ICD-10-PCS | Mod: HCNC,NTX,, | Performed by: INTERNAL MEDICINE

## 2023-02-01 PROCEDURE — 80053 COMPREHEN METABOLIC PANEL: CPT | Mod: HCNC,NTX | Performed by: EMERGENCY MEDICINE

## 2023-02-01 PROCEDURE — 99900035 HC TECH TIME PER 15 MIN (STAT): Mod: HCNC,NTX

## 2023-02-01 PROCEDURE — 80047 BASIC METABLC PNL IONIZED CA: CPT | Mod: HCNC,NTX

## 2023-02-01 PROCEDURE — 81001 URINALYSIS AUTO W/SCOPE: CPT | Mod: HCNC,NTX | Performed by: EMERGENCY MEDICINE

## 2023-02-01 PROCEDURE — 84484 ASSAY OF TROPONIN QUANT: CPT | Mod: HCNC,NTX | Performed by: EMERGENCY MEDICINE

## 2023-02-01 PROCEDURE — 25000003 PHARM REV CODE 250: Mod: HCNC,NTX | Performed by: EMERGENCY MEDICINE

## 2023-02-01 PROCEDURE — 99223 1ST HOSP IP/OBS HIGH 75: CPT | Mod: AI,HCNC,NTX, | Performed by: HOSPITALIST

## 2023-02-01 PROCEDURE — 99223 PR INITIAL HOSPITAL CARE,LEVL III: ICD-10-PCS | Mod: AI,HCNC,NTX, | Performed by: HOSPITALIST

## 2023-02-01 PROCEDURE — 0241U SARS-COV2 (COVID) WITH FLU/RSV BY PCR: CPT | Mod: HCNC,NTX | Performed by: EMERGENCY MEDICINE

## 2023-02-01 PROCEDURE — 94761 N-INVAS EAR/PLS OXIMETRY MLT: CPT | Mod: HCNC,NTX

## 2023-02-01 RX ORDER — MONTELUKAST SODIUM 5 MG/1
5 TABLET, CHEWABLE ORAL NIGHTLY
Status: DISCONTINUED | OUTPATIENT
Start: 2023-02-01 | End: 2023-02-10 | Stop reason: HOSPADM

## 2023-02-01 RX ORDER — ACETAMINOPHEN 500 MG
1000 TABLET ORAL
Status: COMPLETED | OUTPATIENT
Start: 2023-02-01 | End: 2023-02-01

## 2023-02-01 RX ORDER — SODIUM CHLORIDE 0.9 % (FLUSH) 0.9 %
10 SYRINGE (ML) INJECTION
Status: CANCELLED | OUTPATIENT
Start: 2023-02-01

## 2023-02-01 RX ORDER — TRAZODONE HYDROCHLORIDE 50 MG/1
50 TABLET ORAL NIGHTLY PRN
Status: DISCONTINUED | OUTPATIENT
Start: 2023-02-01 | End: 2023-02-10 | Stop reason: HOSPADM

## 2023-02-01 RX ORDER — FUROSEMIDE 20 MG/1
40 TABLET ORAL 2 TIMES DAILY
Status: DISCONTINUED | OUTPATIENT
Start: 2023-02-01 | End: 2023-02-06

## 2023-02-01 RX ORDER — ESCITALOPRAM OXALATE 10 MG/1
10 TABLET ORAL DAILY
Status: DISCONTINUED | OUTPATIENT
Start: 2023-02-02 | End: 2023-02-10 | Stop reason: HOSPADM

## 2023-02-01 RX ORDER — LEVOTHYROXINE SODIUM 100 UG/1
200 TABLET ORAL
Status: DISCONTINUED | OUTPATIENT
Start: 2023-02-02 | End: 2023-02-10 | Stop reason: HOSPADM

## 2023-02-01 RX ORDER — SODIUM BICARBONATE 650 MG/1
650 TABLET ORAL 2 TIMES DAILY
Status: DISCONTINUED | OUTPATIENT
Start: 2023-02-01 | End: 2023-02-04

## 2023-02-01 RX ORDER — ACETAMINOPHEN 325 MG/1
650 TABLET ORAL EVERY 6 HOURS PRN
Status: DISCONTINUED | OUTPATIENT
Start: 2023-02-01 | End: 2023-02-10 | Stop reason: HOSPADM

## 2023-02-01 RX ORDER — TALC
6 POWDER (GRAM) TOPICAL NIGHTLY PRN
Status: CANCELLED | OUTPATIENT
Start: 2023-02-01

## 2023-02-01 RX ORDER — OXYCODONE HYDROCHLORIDE 5 MG/1
5 TABLET ORAL EVERY 4 HOURS PRN
Status: DISCONTINUED | OUTPATIENT
Start: 2023-02-01 | End: 2023-02-10 | Stop reason: HOSPADM

## 2023-02-01 RX ADMIN — SODIUM BICARBONATE 650 MG: 650 TABLET ORAL at 08:02

## 2023-02-01 RX ADMIN — ACETAMINOPHEN 1000 MG: 500 TABLET ORAL at 01:02

## 2023-02-01 RX ADMIN — OXYCODONE HYDROCHLORIDE 5 MG: 5 TABLET ORAL at 07:02

## 2023-02-01 RX ADMIN — FUROSEMIDE 40 MG: 20 TABLET ORAL at 10:02

## 2023-02-01 NOTE — ED PROVIDER NOTES
Chief Complaint   Fatigue (Had L fistula placed yesterday, L arm now swollen and dressing site saturated in blood. )      History Of Present Illness   Kathie Quezada is a 62 y.o. female presenting with some reported bleeding of from her left arm surgical site as well as increasing shortness of breath.  She spoke to the vascular surgeon once and was told to come to the ER for evaluation.  The patient notes some chronic headache as well.  The shortness of breath has worsened since she was in the hospital yesterday having the surgery.      History obtained from: Patient    Review of patient's allergies indicates:   Allergen Reactions    Atorvastatin Other (See Comments)     Statin induced myalgia    Mushroom Hives    Peanut Anaphylaxis    Bactrim [sulfamethoxazole-trimethoprim] Hives    Gabapentin      Pruritis     Iodine     Peanut butter flavor     Penicillins Hives    Shellfish containing products Itching    Sulfa (sulfonamide antibiotics) Hives       Current Facility-Administered Medications on File Prior to Encounter   Medication Dose Route Frequency Provider Last Rate Last Admin    [DISCONTINUED] 0.9%  NaCl infusion   Intravenous Continuous Tirso Tavera MD        [DISCONTINUED] 0.9%  NaCl infusion   Intravenous Continuous Ward Cadena MD        [DISCONTINUED] ceFAZolin 2 g in dextrose 5 % in water (D5W) 5 % 50 mL IVPB (MB+)  2 g Intravenous On Call Procedure Ward Cadena MD        [DISCONTINUED] fentaNYL 50 mcg/mL injection 25 mcg  25 mcg Intravenous Q5 Min PRN Attila Myrick MD        [DISCONTINUED] haloperidol lactate injection 0.5 mg  0.5 mg Intravenous Q10 Min PRN Attila Myrick MD        [DISCONTINUED] LIDOcaine (PF) 10 mg/ml (1%) injection 10 mg  1 mL Intradermal Once PRN Kumar Pennington MD        [DISCONTINUED] ondansetron disintegrating tablet 8 mg  8 mg Oral Q8H PRN Kumar Pennington MD        [DISCONTINUED] sodium chloride 0.9% flush 10 mL  10 mL Intravenous PRN Kumar Pennington MD         Current  Outpatient Medications on File Prior to Encounter   Medication Sig Dispense Refill    carvediloL (COREG) 25 MG tablet Take 1 tablet (25 mg total) by mouth 2 (two) times daily with meals. 180 tablet 3    SYNTHROID 200 mcg tablet Take 1 tablet (200 mcg total) by mouth before breakfast. 90 tablet 3    amLODIPine (NORVASC) 10 MG tablet Take 1 tablet (10 mg total) by mouth nightly. 90 tablet 3    calcitRIOL (ROCALTROL) 0.25 MCG Cap Take 1 capsule (0.25 mcg total) by mouth once daily. 90 capsule 1    cholecalciferol, vitamin D3, 125 mcg (5,000 unit) Tab Take 1 tablet (5,000 Units total) by mouth once daily. 90 tablet 3    EScitalopram oxalate (LEXAPRO) 10 MG tablet Take 1 tablet (10 mg total) by mouth once daily. 90 tablet 3    furosemide (LASIX) 40 MG tablet Take 1 tablet (40 mg total) by mouth 2 (two) times a day. 180 tablet 3    levocetirizine (XYZAL) 5 MG tablet Take 1 tablet (5 mg total) by mouth daily as needed for Allergies. 90 tablet 3    montelukast (SINGULAIR) 5 MG chewable tablet Take 1 tablet (5 mg total) by mouth every evening. 30 tablet 0    oxyCODONE (ROXICODONE) 5 MG immediate release tablet Take 1 tablet (5 mg total) by mouth every 4 (four) hours as needed for Pain. 15 tablet 0    sodium bicarbonate 325 MG tablet Take 2 tablets (650 mg total) by mouth 2 (two) times daily. 120 tablet 6    traZODone (DESYREL) 50 MG tablet Take 1 to 2 tablets ( mg total) by mouth nightly as needed for Insomnia. 120 tablet 3       Past History   As per HPI and below:  Past Medical History:   Diagnosis Date    Acute respiratory failure with hypoxia and hypercapnia 05/26/2019    ATN (acute tubular necrosis) 02/08/2019    Chronic diastolic heart failure 02/07/2019    2-8-19 Mild left atrial enlargement. Mild left ventricular enlargement. Normal left ventricular systolic function. The estimated ejection fraction is 55% Indeterminate left ventricular diastolic function. Normal right ventricular systolic function. Mild mitral  regurgitation. Mild to moderate tricuspid regurgitation. The estimated PA systolic pressure is 32 mm Hg Normal central venous pressure (3 m    CKD (chronic kidney disease) stage 4, GFR 15-29 ml/min 05/16/2018    CKD stage G3a/A3, GFR 45-59 and albumin creatinine ratio >300 mg/g 05/16/2018    CKD stage G3b/A3, GFR 30-44 and albumin creatinine ratio >300 mg/g 05/16/2018    Closed compression fracture of L2 lumbar vertebra 05/24/2019    Closed compression fracture of L2 lumbar vertebra 05/24/2019     IMO Regulatory Load October 2019    Controlled type 2 diabetes with retinopathy 04/09/2018    COVID-19 virus infection 7-1-2021 07/01/2021    CVA (cerebral vascular accident) 2017    Diabetic polyneuropathy associated with type 2 diabetes mellitus 12/04/2020    Encounter for blood transfusion     Essential hypertension 04/09/2018    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  7-31-19 Factor XI Activity 55 - 145 % 96       Gastroesophageal reflux disease without esophagitis 05/24/2019    Generalized anxiety disorder 11/08/2019    GERD (gastroesophageal reflux disease)     GIB (gastrointestinal bleeding) 10/18/2018    History of CVA (cerebrovascular accident) without residual deficits 11/27/2022    History of hepatitis C     s/p succesful Rx w/ Harvoni  - SVR12 (cure) 2016    Hypertensive retinopathy, bilateral 12/10/2019    Mild nonproliferative diabetic retinopathy 05/02/2018    Mixed hyperlipidemia 04/09/2018    Myalgia due to statin 09/07/2022    Normocytic anemia 01/14/2022    NS (nuclear sclerosis), bilateral 12/10/2019    PNA (pneumonia) 05/28/2019    Postoperative hypothyroidism     Renovascular hypertension 04/09/2018    S/P kyphoplasty 08/14/2019 8/14/19: L2 kyphoplasty with Dr. Kwon     Slow transit constipation 05/27/2019    Type 2 diabetes mellitus with circulatory disorder, with long-term current use of insulin 04/09/2018    Type 2 diabetes mellitus with stage 3 chronic kidney disease,  without long-term current use of insulin 04/09/2018    Type 2 diabetes mellitus with stage 3a chronic kidney disease, without long-term current use of insulin 03/03/2021    Type 2 diabetes mellitus with stage 3b chronic kidney disease, without long-term current use of insulin 09/07/2022     Past Surgical History:   Procedure Laterality Date    AV FISTULA PLACEMENT Left 10/27/2022    Procedure: CREATION, AV FISTULA;  Surgeon: Tirso Tavera MD;  Location: [facility] OR [address] [place] [address];  Service: Peripheral Vascular;  Laterality: Left;  Brachio basilic 1st stage     BACK SURGERY  2017    CHOLECYSTECTOMY  2015    [facility]    COLONOSCOPY  11/13/2015    Dr Chino torres prep. internal hemorrhoids. diverticulosis of sigmois. NO polyps. repeat due 2025    FISTULOGRAM N/A 12/16/2022    Procedure: Fistulogram;  Surgeon: Tirso Tavera MD;  Location: [facility] CATH LAB;  Service: Peripheral Vascular;  Laterality: N/A;    FIXATION KYPHOPLASTY Bilateral 08/14/2019    Procedure: L2 kyphoplasty Fluoro Globus;  Surgeon: Jeremie Kwon DO;  Location: [facility] OR;  Service: Neurosurgery;  Laterality: Bilateral;  GLOBUS MACRINA WINSLOW [phone] TEXTED HER @ 10:54AM ON 7-30-19  PRE-OP-BY RN  8-7-2019    HYSTERECTOMY      OOPHORECTOMY  1996    one    PERCUTANEOUS TRANSLUMINAL ANGIOPLASTY OF ARTERIOVENOUS FISTULA Left 12/16/2022    Procedure: PTA, AV FISTULA;  Surgeon: Tirso Tavera MD;  Location: [facility] CATH LAB;  Service: Peripheral Vascular;  Laterality: Left;    THYROIDECTOMY  2016    at [facility] for thyroid nodule    TONSILLECTOMY      TRANSPOSITION OF BASILIC VEIN Left 1/31/2023    Procedure: TRANSPOSITION, VEIN, BASILIC;  Surgeon: Tirso Tavera MD;  Location: [facility] OR [address] [place] [address];  Service: Peripheral Vascular;  Laterality: Left;       Social History     Socioeconomic History    Marital status:    Tobacco Use    Smoking status: Never    Smokeless tobacco: Never   Substance and Sexual Activity    Alcohol use: Not Currently    Drug use: No     Sexual activity: Not Currently   Social History Narrative    Pt enjoys cooking and baking and making Flor[employer] and [employer]      Social Determinants of Health     Financial Resource Strain: Low Risk     Difficulty of Paying Living Expenses: Not hard at all   Food Insecurity: No Food Insecurity    Worried About Running Out of Food in the Last Year: Never true    Ran Out of Food in the Last Year: Never true   Transportation Needs: No Transportation Needs    Lack of Transportation (Medical): No    Lack of Transportation (Non-Medical): No   Physical Activity: Unknown    Days of Exercise per Week: 1 day   Stress: No Stress Concern Present    Feeling of Stress : Only a little   Social Connections: Unknown    Frequency of Communication with Friends and Family: More than three times a week    Frequency of Social Gatherings with Friends and Family: More than three times a week    Active Member of Clubs or Organizations: Yes    Attends Club or Organization Meetings: 1 to 4 times per year    Marital Status:    Housing Stability: Low Risk     Unable to Pay for Housing in the Last Year: No    Number of Places Lived in the Last Year: 1    Unstable Housing in the Last Year: No       Family History   Problem Relation Age of Onset    Stroke Mother     Heart disease Mother     Hypertension Mother     Diabetes type II Mother     Coronary artery disease Mother     Hypertension Father     Prostate cancer Father     Aneurysm Brother        Physical Exam     Vitals:    02/01/23 1304 02/01/23 1418 02/01/23 1428 02/01/23 1439   BP:  (!) 141/62  123/63   Pulse: 79 73 74 72   Resp: 12 19 16 20   Temp:    98.9 °F (37.2 °C)   TempSrc:    Oral   SpO2: 100% 98% 100% 99%   Weight:       Height:         Appearance: No acute distress.  Skin: No rashes seen.  Good turgor.  No abrasions.  No ecchymoses.  Eyes: No conjunctival injection.  ENT: Oropharynx clear.    Chest: Clear to auscultation bilaterally.  Good air movement.  No  wheezes.  No rhonchi.  Cardiovascular: Regular rate and rhythm.  No murmurs. No gallops. No rubs.  Abdomen: Soft.  Not distended.  Nontender.  No guarding.  No rebound.  Musculoskeletal: Good range of motion all joints.  No deformities.  Neck supple.  No meningismus.  Neurologic: Motor intact.  Sensation intact.  Cerebellar intact.  Cranial nerves intact.  Mental Status:  Alert and oriented x 3.  Appropriate, conversant.      Initial MDM   The patient is complaining of some issues at her surgical site.  Will discuss with vascular surgery and have them see the patient.  She also is febrile and short of breath.  Will obtain a general medical workup.  The dressing is not soaked through and there still is doppler signal in the radial artery.  I suspect this patient will end up being hospitalized to Hospital Medicine.  Differential diagnosis includes pneumonia, volume overload, viral syndrome.  Less likely to be urinary tract infection as the patient does not make much urine given her hemodialysis status and she states there has been no abnormal urination.    Medications Given     Medications   acetaminophen tablet 1,000 mg (1,000 mg Oral Given 2/1/23 1302)       Results and Course     Labs Reviewed   CBC W/ AUTO DIFFERENTIAL - Abnormal; Notable for the following components:       Result Value    WBC 13.64 (*)     RBC 3.65 (*)     Hemoglobin 8.9 (*)     Hematocrit 30.6 (*)     MCH 24.4 (*)     MCHC 29.1 (*)     RDW 14.8 (*)     Immature Granulocytes 0.7 (*)     Gran # (ANC) 9.9 (*)     Immature Grans (Abs) 0.09 (*)     Mono # 1.2 (*)     Lymph % 16.9 (*)     All other components within normal limits   COMPREHENSIVE METABOLIC PANEL - Abnormal; Notable for the following components:    Potassium 5.6 (*)     CO2 18 (*)     BUN 33 (*)     Creatinine 4.4 (*)     Calcium 7.7 (*)     Albumin 3.4 (*)     ALT 5 (*)     eGFR 10.9 (*)     All other components within normal limits   ISTAT LACTATE - Abnormal; Notable for the following  components:    POC Lactate 0.40 (*)     All other components within normal limits   ISTAT PROCEDURE - Abnormal; Notable for the following components:    POC BUN 45 (*)     POC Creatinine 4.8 (*)     POC Potassium 5.4 (*)     POC TCO2 (MEASURED) 22 (*)     POC Ionized Calcium 0.96 (*)     POC Hematocrit 29 (*)     All other components within normal limits   CULTURE, BLOOD   CULTURE, BLOOD   SARS-COV2 (COVID) WITH FLU/RSV BY PCR   TROPONIN I   TROPONIN I    Narrative:     add on trop per dr.david amor /order#[id number] @ 02/01/2023  13:38    URINALYSIS, REFLEX TO URINE CULTURE   ISTAT CHEM8       Imaging Results              X-Ray Chest AP Portable (Final result)  Result time 02/01/23 13:32:01      Final result by Ishmael Pérez MD (02/01/23 13:32:01)                   Impression:      See above      Electronically signed by: Ishmael Pérez MD  Date:    02/01/2023  Time:    13:32               Narrative:    EXAMINATION:  XR CHEST AP PORTABLE    CLINICAL HISTORY:  Fever, unspecified    TECHNIQUE:  Single frontal view of the chest was performed.    COMPARISON:  No 07/30/2022 ne    FINDINGS:  Mild cardiomegaly.  The lungs are clear.  No pleural effusion                                      ED Course as of 02/01/23 1453   Wed Feb 01, 2023   1248 Discussed exam and vitals with vascular surgery, they will see patient [DC]   1325 WBC(!): 13.64 [DC]   1325 Hemoglobin(!): 8.9 [DC]   1325 Platelets: 170 [DC]   1325 POC Lactate(!): 0.40 [DC]   1331 Vascular surgery states the wound looks appropriate and does not need any further intervention at this time.  They recommend Hospital Medicine admission given her fever and shortness of breath. [DC]   1331 X-Ray Chest AP Portable  Increased interstitial markings, no yany consolidation per my independent interpretation.     [DC]   1416 Troponin I: 0.010 [DC]   1417 Potassium(!): 5.6  POC is 5.4, also hemolysis noted [DC]   1417 Creatinine(!): 4.4 [DC]   1417 SARS-CoV2 (COVID-19)  Qualitative PCR: Not Detected [DC]   1417 Influenza A, Molecular: Not Detected [DC]   1417 Influenza B, Molecular: Not Detected [DC]   1417 RSV Ag by Molecular Method: Not Detected [DC]   1447 Sats drop to 88% on room air, restarted O2 at 3L [DC]      ED Course User Index  [DC] Elan Kaur MD           MDM, Impression and Plan   62 y.o. female with shortness of breath and hypoxia, no obvious source on chest x-ray.  Also febrile.  Sats dropped to 88 on room air.  Will admit to medicine for further workup.  Discussed with hospital medicine.  Will defer antibiotics to them as the patient's lactate is normal, vitals are stable and there was no obvious source.  Urinalysis is pending.         Final diagnoses:  [R50.9] Fever  [R06.02] Shortness of breath  [R09.02] Hypoxia (Primary)        ED Disposition Condition    Admit Stable                  Elan Kaur MD  02/01/23 [id number]

## 2023-02-01 NOTE — SUBJECTIVE & OBJECTIVE
(Not in a hospital admission)      Review of patient's allergies indicates:   Allergen Reactions    Atorvastatin Other (See Comments)     Statin induced myalgia    Mushroom Hives    Peanut Anaphylaxis    Bactrim [sulfamethoxazole-trimethoprim] Hives    Gabapentin      Pruritis     Iodine     Peanut butter flavor     Penicillins Hives    Shellfish containing products Itching    Sulfa (sulfonamide antibiotics) Hives       Past Medical History:   Diagnosis Date    Acute respiratory failure with hypoxia and hypercapnia 05/26/2019    ATN (acute tubular necrosis) 02/08/2019    Chronic diastolic heart failure 02/07/2019    2-8-19 Mild left atrial enlargement. Mild left ventricular enlargement. Normal left ventricular systolic function. The estimated ejection fraction is 55% Indeterminate left ventricular diastolic function. Normal right ventricular systolic function. Mild mitral regurgitation. Mild to moderate tricuspid regurgitation. The estimated PA systolic pressure is 32 mm Hg Normal central venous pressure (3 m    CKD (chronic kidney disease) stage 4, GFR 15-29 ml/min 05/16/2018    CKD stage G3a/A3, GFR 45-59 and albumin creatinine ratio >300 mg/g 05/16/2018    CKD stage G3b/A3, GFR 30-44 and albumin creatinine ratio >300 mg/g 05/16/2018    Closed compression fracture of L2 lumbar vertebra 05/24/2019    Closed compression fracture of L2 lumbar vertebra 05/24/2019     IMO Regulatory Load October 2019    Controlled type 2 diabetes with retinopathy 04/09/2018    COVID-19 virus infection 7-1-2021 07/01/2021    CVA (cerebral vascular accident) 2017    Diabetic polyneuropathy associated with type 2 diabetes mellitus 12/04/2020    Encounter for blood transfusion     Essential hypertension 04/09/2018    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  7-31-19 Factor XI Activity 55 - 145 % 96       Gastroesophageal reflux disease without esophagitis 05/24/2019    Generalized anxiety disorder 11/08/2019    GERD  (gastroesophageal reflux disease)     GIB (gastrointestinal bleeding) 10/18/2018    History of CVA (cerebrovascular accident) without residual deficits 11/27/2022    History of hepatitis C     s/p succesful Rx w/ [facility]  - [id number] (cure) 2016    Hypertensive retinopathy, bilateral 12/10/2019    Mild nonproliferative diabetic retinopathy 05/02/2018    Mixed hyperlipidemia 04/09/2018    Myalgia due to statin 09/07/2022    Normocytic anemia 01/14/2022    NS (nuclear sclerosis), bilateral 12/10/2019    PNA (pneumonia) 05/28/2019    Postoperative hypothyroidism     Renovascular hypertension 04/09/2018    S/P kyphoplasty 08/14/2019 8/14/19: L2 kyphoplasty with Dr. Kwon     Slow transit constipation 05/27/2019    Type 2 diabetes mellitus with circulatory disorder, with long-term current use of insulin 04/09/2018    Type 2 diabetes mellitus with stage 3 chronic kidney disease, without long-term current use of insulin 04/09/2018    Type 2 diabetes mellitus with stage 3a chronic kidney disease, without long-term current use of insulin 03/03/2021    Type 2 diabetes mellitus with stage 3b chronic kidney disease, without long-term current use of insulin 09/07/2022     Past Surgical History:   Procedure Laterality Date    AV FISTULA PLACEMENT Left 10/27/2022    Procedure: CREATION, AV FISTULA;  Surgeon: Tirso Tavera MD;  [address];  Service: Peripheral Vascular;  Laterality: Left;  Brachio basilic 1st stage     BACK SURGERY  2017    CHOLECYSTECTOMY  2015    [facility]    COLONOSCOPY  11/13/2015    Dr Chino torres prep. internal hemorrhoids. diverticulosis of sigmois. NO polyps. repeat due 2025    FISTULOGRAM N/A 12/16/2022    Procedure: Fistulogram;  Surgeon: Tirso Tavera MD;  Location: [facility] CATH LAB;  Service: Peripheral Vascular;  Laterality: N/A;    FIXATION KYPHOPLASTY Bilateral 08/14/2019    Procedure: L2 kyphoplasty Fluoro Globus;  Surgeon: Jeremie Kwon DO;  Location: [facility] OR;  Service:  Neurosurgery;  Laterality: Bilateral;  CROW [place] [phone] TEXTED HER @ 10:54AM ON 7-30-19  PRE-OP-BY RN  8-7-2019    HYSTERECTOMY      OOPHORECTOMY  1996    one    PERCUTANEOUS TRANSLUMINAL ANGIOPLASTY OF ARTERIOVENOUS FISTULA Left 12/16/2022    Procedure: PTA, AV FISTULA;  Surgeon: Tirso Tavera MD;  Location: [facility] CATH LAB;  Service: Peripheral Vascular;  Laterality: Left;    THYROIDECTOMY  2016    at [facility] for thyroid nodule    TONSILLECTOMY      TRANSPOSITION OF BASILIC VEIN Left 1/31/2023    Procedure: TRANSPOSITION, VEIN, BASILIC;  Surgeon: Tirso Tavera MD;  Location: [facility] OR [address] [place] [address];  Service: Peripheral Vascular;  Laterality: Left;     Family History       Problem Relation (Age of Onset)    Aneurysm Brother    Coronary artery disease Mother    Diabetes type II Mother    Heart disease Mother    Hypertension Mother, Father    Prostate cancer Father    Stroke Mother          Tobacco Use    Smoking status: Never    Smokeless tobacco: Never   Substance and Sexual Activity    Alcohol use: Not Currently    Drug use: No    Sexual activity: Not Currently     Review of Systems   Constitutional:  Positive for chills.   Cardiovascular:  Negative for chest pain.   Genitourinary:  Negative for dysuria and frequency.   Objective:     Vital Signs (Most Recent):  Temp: (!) 101 °F (38.3 °C) (02/01/23 1223)  Pulse: 79 (02/01/23 1304)  Resp: 12 (02/01/23 1304)  BP: 126/60 (02/01/23 1303)  SpO2: 100 % (02/01/23 1304)   Vital Signs (24h Range):  Temp:  [101 °F (38.3 °C)] 101 °F (38.3 °C)  Pulse:  [69-80] 79  Resp:  [12-28] 12  SpO2:  [95 %-100 %] 100 %  BP: (108-150)/(60-84) 126/60     Weight: 104.3 kg (230 lb)  Body mass index is 36.02 kg/m².    Physical Exam  Constitutional:       Appearance: She is obese.   HENT:      Head: Normocephalic and atraumatic.      Mouth/Throat:      Mouth: Mucous membranes are moist.   Eyes:      Pupils: Pupils are equal, round, and reactive to light.   Cardiovascular:      Rate and  Rhythm: Normal rate and regular rhythm.   Abdominal:      Palpations: Abdomen is soft.      Tenderness: There is no abdominal tenderness.   Skin:     Comments: Left arm surgical site soft clean and intact. Dressings with serous fluid, no hematoma, no active bleeding   Neurological:      Mental Status: She is alert.       Significant Labs:  Recent Lab Results         02/01/23  1302   02/01/23  1302   02/01/23  1255   02/01/23  1150        RSV Ag by Molecular Method     Not Detected         Influenza A, Molecular     Not Detected         Influenza B, Molecular     Not Detected         Albumin   3.4           Alkaline Phosphatase   69           Allens Test       N/A       ALT   5           Anion Gap   11           AST   22  Comment: *Result may be interfered by visible hemolysis           Baso #   0.08           Basophil %   0.6           BILIRUBIN TOTAL   0.3  Comment: For infants and newborns, interpretation of results should be based  on gestational age, weight and in agreement with clinical  observations.    Premature Infant recommended reference ranges:  Up to 24 hours.............<8.0 mg/dL  Up to 48 hours............<12.0 mg/dL  3-5 days..................<15.0 mg/dL  6-29 days.................<15.0 mg/dL             Site       Other       BUN   33           Calcium   7.7           Chloride   109           CO2   18           Creatinine   4.4           Differential Method   Automated           eGFR   10.9           Eos #   0.1           Eosinophil %   0.7           Glucose   85           Gran # (ANC)   9.9           Gran %   72.4           Hematocrit   30.6           Hemoglobin   8.9           Immature Grans (Abs)   0.09  Comment: Mild elevation in immature granulocytes is non specific and   can be seen in a variety of conditions including stress response,   acute inflammation, trauma and pregnancy. Correlation with other   laboratory and clinical findings is essential.             Immature Granulocytes   0.7            Lymph #   2.3           Lymph %   16.9           MCH   24.4           MCHC   29.1           MCV   84           Mono #   1.2           Mono %   8.7           MPV   10.9           nRBC   0           Platelets   170           POC BUN 45             POC Chloride 108             POC Creatinine 4.8             POC Glucose 87             POC Hematocrit 29             POC Ionized Calcium 0.96             POC Lactate       0.40       POC Potassium 5.4             POC Sodium 136             POC TCO2 (MEASURED) 22             Potassium   5.6  Comment: *Result may be interfered by visible hemolysis           PROTEIN TOTAL   7.4  Comment: *Result may be interfered by visible hemolysis           RBC   3.65           RDW   14.8           Sample RADHA       VENOUS       SARS-CoV2 (COVID-19) Qualitative PCR     Not Detected  Comment: This test utilizes a real-time reverse transcription  polymerase chain reaction procedure to amplify and   detect the SARS-CoV-2 and detect the SARS-CoV-2 N2 and E nucleic  acid targets. The analytical sensitivity (limit of detection) of   this assay is 250 copies/mL.    A Detected result implies that the patient is infected with the  SARS-CoV-2 virus and is presumed to be contagious.    A Not Detected result implies that the SARS-CoV-2 target nucleic  acids are not present above the limit of detection.  It does not  rule out the possibility of COVID-19 and should not be the sole  basis for treatment decisions. If COVID-19 is strongly suspected  based on clinical and epidemiological history, re-testing should  be considered.    This test is only for use under Food and Drug   Administration s Emergency Use Authorization (EUA).   Commercial reagents are provided by [employer].  Performance characteristics of the EUA have been   independently verified by [facility]   Department of Pathology and Laboratory Medicine.             Sodium   138           Troponin I   0.010  Comment: The reference  interval for Troponin I represents the 99th percentile   cutoff   for our facility and is consistent with 3rd generation assay   performance.             WBC   13.64                   Significant Diagnostics:  CXR: X-Ray Chest AP Portable    Result Date: 2/1/2023  See above Electronically signed by: Ishmael Pérez MD Date:    02/01/2023 Time:    13:32

## 2023-02-01 NOTE — HPI
Patient is a 62 y.o. female with a h/o DMII with retinopathy, HTN, CVA, Factor 11 deficiency, and multiple AKIS the last was from urinary retention. She underwent a left basilic vein transposition yesterday with Dr. Tavera. The dressing was saturated and her family was concerned this reflected bleeding so they presented to the emergency department. She also has a history of fever and dyspnea requiring oxygen here in the emergency department.

## 2023-02-01 NOTE — ASSESSMENT & PLAN NOTE
62 year old woman with CKD 5 not yet on dialysis now post op day 1 after baslic vein transposition    No evidence of bleeding or surgical wound complication however she is on oxygen and had a fever along with new leukocytosis to 13  Leukocytosis could be reactive and fever could be result of atelectasis but given patients comorbid state would recommend evaluation by hospital medicine

## 2023-02-01 NOTE — TELEPHONE ENCOUNTER
PD day 1 escalation. Pt reports that she spoke with nurse earlier, and reports that she is still feeling bad. Reports weakness, and SOB. States that weakness started after discharge from hospital yesterday. Reports she just had fistula placed.  Advised to call 911 now. Pt verbalized understanding. States granddaughter is home with her  Reason for Disposition   SEVERE weakness (i.e., unable to walk or barely able to walk, requires support) and new-onset or worsening    Additional Information   Negative: SEVERE difficulty breathing (e.g., struggling for each breath, speaks in single words)   Negative: Shock suspected (e.g., cold/pale/clammy skin, too weak to stand, low BP, rapid pulse)   Negative: Difficult to awaken or acting confused (e.g., disoriented, slurred speech)   Negative: Fainted > 15 minutes ago and still feels too weak or dizzy to stand    Protocols used: Weakness (Generalized) and Fatigue-A-OH

## 2023-02-01 NOTE — OP NOTE
[facility] - Surgery ([facility])  General Surgery  Operative Note    SUMMARY     Date of Procedure: 1/31/2023     Procedure: Transposition L brachial artery/basilic vein AVF    Surgeon(s) and Role:     * Tirso Tavera MD - Primary     * Ward Cadena MD - Resident - Assisting    Pre-Operative Diagnosis: S/P arteriovenous (AV) fistula creation [Z98.890]    Post-Operative Diagnosis: Post-Op Diagnosis Codes:     * S/P arteriovenous (AV) fistula creation [Z98.890]    Anesthesia: Regional    Description of the Findings of the Procedure: 2nd stage L BVT AVF done; dilated to 5-6 mm with strong thrill throughout course. Fistula raised into subcutaneous tissue with a flap created laterally. Palpable thrill at conclusion of the case.     Procedure in detail:   This patient is A 62 y.o. lady with ESRD not yet on dialysis. She had a previous brachiobasilic AVF creation, she is now in need of transposition. The risks of the procedure were discussed with the patient and she wishes to proceed after signing informed consent. She was then transported to the operating room and placed on the table in the supine position. Anesthesia was induced without complication. The anesthesia team performed a left sided block. Her left arm was then prepped and draped in the usual standard fashion. Prophylactic antibiotics were administered. A timeout was performed verifying correct patient and procedure.     The basilic vein was found with ultrasound and its course was marked out. We created a longitudinal incision over the marked vein. The incision was deepened with a mix of blunt dissection, sharp dissection, and electrocautery through the subcutaneous tissue. The basilic vein was encountered and dissected free of surrounding adhesions. Dissection was carried out distally to the site of the previous anastomosis, being careful to identify any branches and ligate them with 3-0 silk ties. The branches were divided. The vein was dissected proximally  to the bifurcation of the brachial vein and basilic vein. The median cutaneous nerve was identified running along the anterior aspect of the vein and was preserved during the dissection. However, it became clear that after the dissection was complete, the vein could not be superficialized without transecting the nerve. Thus, a branch of the nerve was transected to facilitate superficialization. Now placed beneath the vein, the nerve was spatulated and sutured together using 7-0 prolene in a U-stitch fashion. Of note, the dissection was complicated by a large amount of adipose tissue.     We then began to create flaps along the lateral aspect of the upper arm using blunt dissection and electrocautery, being careful to be superficial enough to allow for the thrill to be palpated. The dissection was carried proximally and distally to allow the mobilized basilic vein to lie comfortably in the space. The fascia was then closed under the vein with figure-of-8 2-0 vicryl stitches. We loosely closed the distal aspect of the incision to minimize risk of stenosis at the anastomosis. There were no kinks in the fistula.     Excellent hemostasis was achieved. The wound was irrigated with saline. We closed the skin with interrupted deep dermal 3-0 Vicryls and staples. At the conclusion of the case, the fistula continued to have palpable thrill. There continued to be a palpable radial pulse.     The incision was dressed with Telfa, 4x4, and tegaderm.     The patient was then awaken from anesthesia and transported to the PACU in stable condition.   Dr. Tavera was scrubbed for the case.      Complications: No    Estimated Blood Loss (EBL): * No values recorded between 1/31/2023  7:48 AM and 1/31/2023 10:30 AM *           Implants: * No implants in log *    Specimens:   Specimen (24h ago, onward)      None                    Condition: Stable    Disposition: PACU - hemodynamically stable.    Attestation: Dr. Tavera was scrubbed and  present for all parts of the procedure.    Ward Cadena MD  [facility]     Tirso Tavera MD [facility]   Vascular/Endovascular Surgery

## 2023-02-01 NOTE — TELEPHONE ENCOUNTER
Received a call from a pt who verbalized that she never received d/c instructions after her surgery yesterday.Pt had multiple questions about when to take the drsg off, bathing issues,etc.All questions were addressed and pt verbalized understanding of information received.

## 2023-02-01 NOTE — ED NOTES
Per EMS Fistula placed in L arm yesterday, L arm swollen and hot to touch. Bandaged soiled with blood. Have not received dialysis. Pt endorses SOB and weakness, placed on 2L NC

## 2023-02-01 NOTE — CONSULTS
[facility] - Emergency Dept  Vascular Surgery  Consult Note    Inpatient consult to Vascular Surgery  Consult performed by: Ward Cadena MD  Consult ordered by: Elan Kaur MD      Subjective:     Chief Complaint/Reason for Admission: av fistula incision problem    History of Present Illness: Patient is a 62 y.o. female with a h/o DMII with retinopathy, HTN, CVA, Factor 11 deficiency, and multiple AKIS the last was from urinary retention. She underwent a left basilic vein transposition yesterday with Dr. Tavera. The dressing was saturated and her family was concerned this reflected bleeding so they presented to the emergency department. She also has a history of fever and dyspnea requiring oxygen here in the emergency department.       (Not in a hospital admission)      Review of patient's allergies indicates:   Allergen Reactions    Atorvastatin Other (See Comments)     Statin induced myalgia    Mushroom Hives    Peanut Anaphylaxis    Bactrim [sulfamethoxazole-trimethoprim] Hives    Gabapentin      Pruritis     Iodine     Peanut butter flavor     Penicillins Hives    Shellfish containing products Itching    Sulfa (sulfonamide antibiotics) Hives       Past Medical History:   Diagnosis Date    Acute respiratory failure with hypoxia and hypercapnia 05/26/2019    ATN (acute tubular necrosis) 02/08/2019    Chronic diastolic heart failure 02/07/2019    2-8-19 Mild left atrial enlargement. Mild left ventricular enlargement. Normal left ventricular systolic function. The estimated ejection fraction is 55% Indeterminate left ventricular diastolic function. Normal right ventricular systolic function. Mild mitral regurgitation. Mild to moderate tricuspid regurgitation. The estimated PA systolic pressure is 32 mm Hg Normal central venous pressure (3 m    CKD (chronic kidney disease) stage 4, GFR 15-29 ml/min 05/16/2018    CKD stage G3a/A3, GFR 45-59 and albumin creatinine ratio >300 mg/g 05/16/2018    CKD stage G3b/A3, GFR  30-44 and albumin creatinine ratio >300 mg/g 05/16/2018    Closed compression fracture of L2 lumbar vertebra 05/24/2019    Closed compression fracture of L2 lumbar vertebra 05/24/2019     IMO Regulatory Load October 2019    Controlled type 2 diabetes with retinopathy 04/09/2018    COVID-19 virus infection 7-1-2021 07/01/2021    CVA (cerebral vascular accident) 2017    Diabetic polyneuropathy associated with type 2 diabetes mellitus 12/04/2020    Encounter for blood transfusion     Essential hypertension 04/09/2018    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  7-31-19 Factor XI Activity 55 - 145 % 96       Gastroesophageal reflux disease without esophagitis 05/24/2019    Generalized anxiety disorder 11/08/2019    GERD (gastroesophageal reflux disease)     GIB (gastrointestinal bleeding) 10/18/2018    History of CVA (cerebrovascular accident) without residual deficits 11/27/2022    History of hepatitis C     s/p succesful Rx w/ [facility]  - SVR12 (cure) 2016    Hypertensive retinopathy, bilateral 12/10/2019    Mild nonproliferative diabetic retinopathy 05/02/2018    Mixed hyperlipidemia 04/09/2018    Myalgia due to statin 09/07/2022    Normocytic anemia 01/14/2022    NS (nuclear sclerosis), bilateral 12/10/2019    PNA (pneumonia) 05/28/2019    Postoperative hypothyroidism     Renovascular hypertension 04/09/2018    S/P kyphoplasty 08/14/2019 8/14/19: L2 kyphoplasty with Dr. Kwon     Slow transit constipation 05/27/2019    Type 2 diabetes mellitus with circulatory disorder, with long-term current use of insulin 04/09/2018    Type 2 diabetes mellitus with stage 3 chronic kidney disease, without long-term current use of insulin 04/09/2018    Type 2 diabetes mellitus with stage 3a chronic kidney disease, without long-term current use of insulin 03/03/2021    Type 2 diabetes mellitus with stage 3b chronic kidney disease, without long-term current use of insulin 09/07/2022     Past Surgical History:    Procedure Laterality Date    AV FISTULA PLACEMENT Left 10/27/2022    Procedure: CREATION, AV FISTULA;  Surgeon: Tirso Tavera MD;  Location: [facility] OR [address] [place] [address];  Service: Peripheral Vascular;  Laterality: Left;  Brachio basilic 1st stage     BACK SURGERY  2017    CHOLECYSTECTOMY  2015    [facility]    COLONOSCOPY  11/13/2015    Dr Chino torres prep. internal hemorrhoids. diverticulosis of sigmois. NO polyps. repeat due 2025    FISTULOGRAM N/A 12/16/2022    Procedure: Fistulogram;  Surgeon: Tirso Tavera MD;  Location: [facility] CATH LAB;  Service: Peripheral Vascular;  Laterality: N/A;    FIXATION KYPHOPLASTY Bilateral 08/14/2019    Procedure: L2 kyphoplasty Fluoro Globus;  Surgeon: Jeremie Kwon DO;  Location: [facility] OR;  Service: Neurosurgery;  Laterality: Bilateral;  GLOBUS MACRINA WINSLOW [phone] TEXTED HER @ 10:54AM ON 7-30-19  PRE-OP-BY RN  8-7-2019    HYSTERECTOMY      OOPHORECTOMY  1996    one    PERCUTANEOUS TRANSLUMINAL ANGIOPLASTY OF ARTERIOVENOUS FISTULA Left 12/16/2022    Procedure: PTA, AV FISTULA;  Surgeon: Tirso Tavera MD;  Location: [facility] CATH LAB;  Service: Peripheral Vascular;  Laterality: Left;    THYROIDECTOMY  2016    at [facility] for thyroid nodule    TONSILLECTOMY      TRANSPOSITION OF BASILIC VEIN Left 1/31/2023    Procedure: TRANSPOSITION, VEIN, BASILIC;  Surgeon: Tirso Tavera MD;  Location: [facility] OR [address] [place] [address];  Service: Peripheral Vascular;  Laterality: Left;     Family History       Problem Relation (Age of Onset)    Aneurysm Brother    Coronary artery disease Mother    Diabetes type II Mother    Heart disease Mother    Hypertension Mother, Father    Prostate cancer Father    Stroke Mother          Tobacco Use    Smoking status: Never    Smokeless tobacco: Never   Substance and Sexual Activity    Alcohol use: Not Currently    Drug use: No    Sexual activity: Not Currently     Review of Systems   Constitutional:  Positive for chills.   Cardiovascular:  Negative for chest pain.    Genitourinary:  Negative for dysuria and frequency.   Objective:     Vital Signs (Most Recent):  Temp: (!) 101 °F (38.3 °C) (02/01/23 1223)  Pulse: 79 (02/01/23 1304)  Resp: 12 (02/01/23 1304)  BP: 126/60 (02/01/23 1303)  SpO2: 100 % (02/01/23 1304)   Vital Signs (24h Range):  Temp:  [101 °F (38.3 °C)] 101 °F (38.3 °C)  Pulse:  [69-80] 79  Resp:  [12-28] 12  SpO2:  [95 %-100 %] 100 %  BP: (108-150)/(60-84) 126/60     Weight: 104.3 kg (230 lb)  Body mass index is 36.02 kg/m².    Physical Exam  Constitutional:       Appearance: She is obese.   HENT:      Head: Normocephalic and atraumatic.      Mouth/Throat:      Mouth: Mucous membranes are moist.   Eyes:      Pupils: Pupils are equal, round, and reactive to light.   Cardiovascular:      Rate and Rhythm: Normal rate and regular rhythm.   Abdominal:      Palpations: Abdomen is soft.      Tenderness: There is no abdominal tenderness.   Skin:     Comments: Left arm surgical site soft clean and intact. Dressings with serous fluid, no hematoma, no active bleeding   Neurological:      Mental Status: She is alert.       Significant Labs:  Recent Lab Results         02/01/23  1302   02/01/23  1302   02/01/23  1255   02/01/23  1150        RSV Ag by Molecular Method     Not Detected         Influenza A, Molecular     Not Detected         Influenza B, Molecular     Not Detected         Albumin   3.4           Alkaline Phosphatase   69           Allens Test       N/A       ALT   5           Anion Gap   11           AST   22  Comment: *Result may be interfered by visible hemolysis           Baso #   0.08           Basophil %   0.6           BILIRUBIN TOTAL   0.3  Comment: For infants and newborns, interpretation of results should be based  on gestational age, weight and in agreement with clinical  observations.    Premature Infant recommended reference ranges:  Up to 24 hours.............<8.0 mg/dL  Up to 48 hours............<12.0 mg/dL  3-5 days..................<15.0  mg/dL  6-29 days.................<15.0 mg/dL             Site       Other       BUN   33           Calcium   7.7           Chloride   109           CO2   18           Creatinine   4.4           Differential Method   Automated           eGFR   10.9           Eos #   0.1           Eosinophil %   0.7           Glucose   85           Gran # (ANC)   9.9           Gran %   72.4           Hematocrit   30.6           Hemoglobin   8.9           Immature Grans (Abs)   0.09  Comment: Mild elevation in immature granulocytes is non specific and   can be seen in a variety of conditions including stress response,   acute inflammation, trauma and pregnancy. Correlation with other   laboratory and clinical findings is essential.             Immature Granulocytes   0.7           Lymph #   2.3           Lymph %   16.9           MCH   24.4           MCHC   29.1           MCV   84           Mono #   1.2           Mono %   8.7           MPV   10.9           nRBC   0           Platelets   170           POC BUN 45             POC Chloride 108             POC Creatinine 4.8             POC Glucose 87             POC Hematocrit 29             POC Ionized Calcium 0.96             POC Lactate       0.40       POC Potassium 5.4             POC Sodium 136             POC TCO2 (MEASURED) 22             Potassium   5.6  Comment: *Result may be interfered by visible hemolysis           PROTEIN TOTAL   7.4  Comment: *Result may be interfered by visible hemolysis           RBC   3.65           RDW   14.8           Sample RADHA       VENOUS       SARS-CoV2 (COVID-19) Qualitative PCR     Not Detected  Comment: This test utilizes a real-time reverse transcription  polymerase chain reaction procedure to amplify and   detect the SARS-CoV-2 and detect the SARS-CoV-2 N2 and E nucleic  acid targets. The analytical sensitivity (limit of detection) of   this assay is 250 copies/mL.    A Detected result implies that the patient is infected with the  SARS-CoV-2  virus and is presumed to be contagious.    A Not Detected result implies that the SARS-CoV-2 target nucleic  acids are not present above the limit of detection.  It does not  rule out the possibility of COVID-19 and should not be the sole  basis for treatment decisions. If COVID-19 is strongly suspected  based on clinical and epidemiological history, re-testing should  be considered.    This test is only for use under Food and Drug   Administration s Emergency Use Authorization (EUA).   Commercial reagents are provided by [employer].  Performance characteristics of the EUA have been   independently verified by [facility]   Department of Pathology and Laboratory Medicine.             Sodium   138           Troponin I   0.010  Comment: The reference interval for Troponin I represents the 99th percentile   cutoff   for our facility and is consistent with 3rd generation assay   performance.             WBC   13.64                   Significant Diagnostics:  CXR: X-Ray Chest AP Portable    Result Date: 2/1/2023  See above Electronically signed by: Ishmael Pérez MD Date:    02/01/2023 Time:    13:32     Assessment/Plan:     A-V fistula  62 year old woman with CKD 5 not yet on dialysis now post op day 1 after baslic vein transposition    No evidence of bleeding or surgical wound complication however she is on oxygen and had a fever along with new leukocytosis to 13  Leukocytosis could be reactive and fever could be result of atelectasis but given patients comorbid state would recommend evaluation by hospital medicine  Agree with infectious workup  Wound redressed, patient instructed to remove dressing tomorrow      Thank you for your consult. I will follow-up with patient. Please contact us if you have any additional questions.    CHRISTIANO ABERNATHY MD  Vascular Surgery  [facility] - Emergency Dept

## 2023-02-01 NOTE — ED NOTES
I-STAT Chem-8+ Results:   Value Reference Range   Sodium 136 136-145 mmol/L   Potassium  5.4 3.5-5.1 mmol/L   Chloride 108  mmol/L   Ionized Calcium 0.96 1.06-1.42 mmol/L   CO2 (measured) 22 23-29 mmol/L   Glucose 87  mg/dL   BUN 45 6-30 mg/dL   Creatinine 4.8 0.5-1.4 mg/dL   Hematocrit 29 36-54%

## 2023-02-01 NOTE — SUBJECTIVE & OBJECTIVE
Past Medical History:   Diagnosis Date    Acute respiratory failure with hypoxia and hypercapnia 05/26/2019    ATN (acute tubular necrosis) 02/08/2019    Chronic diastolic heart failure 02/07/2019    2-8-19 Mild left atrial enlargement. Mild left ventricular enlargement. Normal left ventricular systolic function. The estimated ejection fraction is 55% Indeterminate left ventricular diastolic function. Normal right ventricular systolic function. Mild mitral regurgitation. Mild to moderate tricuspid regurgitation. The estimated PA systolic pressure is 32 mm Hg Normal central venous pressure (3 m    CKD (chronic kidney disease) stage 4, GFR 15-29 ml/min 05/16/2018    CKD stage G3a/A3, GFR 45-59 and albumin creatinine ratio >300 mg/g 05/16/2018    CKD stage G3b/A3, GFR 30-44 and albumin creatinine ratio >300 mg/g 05/16/2018    Closed compression fracture of L2 lumbar vertebra 05/24/2019    Closed compression fracture of L2 lumbar vertebra 05/24/2019     IMO Regulatory Load October 2019    Controlled type 2 diabetes with retinopathy 04/09/2018    COVID-19 virus infection 7-1-2021 07/01/2021    CVA (cerebral vascular accident) 2017    Diabetic polyneuropathy associated with type 2 diabetes mellitus 12/04/2020    Encounter for blood transfusion     Essential hypertension 04/09/2018    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  7-31-19 Factor XI Activity 55 - 145 % 96       Gastroesophageal reflux disease without esophagitis 05/24/2019    Generalized anxiety disorder 11/08/2019    GERD (gastroesophageal reflux disease)     GIB (gastrointestinal bleeding) 10/18/2018    History of CVA (cerebrovascular accident) without residual deficits 11/27/2022    History of hepatitis C     s/p succesful Rx w/ [facility]  - SVR12 (cure) 2016    Hypertensive retinopathy, bilateral 12/10/2019    Mild nonproliferative diabetic retinopathy 05/02/2018    Mixed hyperlipidemia 04/09/2018    Myalgia due to statin 09/07/2022     Normocytic anemia 01/14/2022    NS (nuclear sclerosis), bilateral 12/10/2019    PNA (pneumonia) 05/28/2019    Postoperative hypothyroidism     Renovascular hypertension 04/09/2018    S/P kyphoplasty 08/14/2019 8/14/19: L2 kyphoplasty with Dr. Kwon     Slow transit constipation 05/27/2019    Type 2 diabetes mellitus with circulatory disorder, with long-term current use of insulin 04/09/2018    Type 2 diabetes mellitus with stage 3 chronic kidney disease, without long-term current use of insulin 04/09/2018    Type 2 diabetes mellitus with stage 3a chronic kidney disease, without long-term current use of insulin 03/03/2021    Type 2 diabetes mellitus with stage 3b chronic kidney disease, without long-term current use of insulin 09/07/2022       Past Surgical History:   Procedure Laterality Date    AV FISTULA PLACEMENT Left 10/27/2022    Procedure: CREATION, AV FISTULA;  Surgeon: Tirso Tavera MD;  Location: [address];  Service: Peripheral Vascular;  Laterality: Left;  Brachio basilic 1st stage     BACK SURGERY  2017    CHOLECYSTECTOMY  2015    [facility]    COLONOSCOPY  11/13/2015    Dr Chino torres prep. internal hemorrhoids. diverticulosis of sigmois. NO polyps. repeat due 2025    FISTULOGRAM N/A 12/16/2022    Procedure: Fistulogram;  Surgeon: Tirso Tavera MD;  Location: [facility] CATH LAB;  Service: Peripheral Vascular;  Laterality: N/A;    FIXATION KYPHOPLASTY Bilateral 08/14/2019    Procedure: L2 kyphoplasty Fluoro Globus;  Surgeon: Jeremie Kwon DO;  Location: [facility] OR;  Service: Neurosurgery;  Laterality: Bilateral;  GLOBUS MACRINA AGOSTOQUINN [phone] TEXTED HER @ 10:54AM ON 7-30-19  PRE-OP-BY RN  8-7-2019    HYSTERECTOMY      OOPHORECTOMY  1996    one    PERCUTANEOUS TRANSLUMINAL ANGIOPLASTY OF ARTERIOVENOUS FISTULA Left 12/16/2022    Procedure: PTA, AV FISTULA;  Surgeon: Tirso Tavera MD;  Location: [facility] CATH LAB;  Service: Peripheral Vascular;  Laterality: Left;    THYROIDECTOMY  2016    at [facility]  for thyroid nodule    TONSILLECTOMY      TRANSPOSITION OF BASILIC VEIN Left 1/31/2023    Procedure: TRANSPOSITION, VEIN, BASILIC;  Surgeon: Tirso Tavera MD;  Location: [facility] OR [address] [place] [address];  Service: Peripheral Vascular;  Laterality: Left;       Review of patient's allergies indicates:   Allergen Reactions    Atorvastatin Other (See Comments)     Statin induced myalgia    Mushroom Hives    Peanut Anaphylaxis    Bactrim [sulfamethoxazole-trimethoprim] Hives    Gabapentin      Pruritis     Iodine     Peanut butter flavor     Penicillins Hives    Shellfish containing products Itching    Sulfa (sulfonamide antibiotics) Hives       No current facility-administered medications on file prior to encounter.     Current Outpatient Medications on File Prior to Encounter   Medication Sig    amLODIPine (NORVASC) 10 MG tablet Take 1 tablet (10 mg total) by mouth nightly.    carvediloL (COREG) 25 MG tablet Take 1 tablet (25 mg total) by mouth 2 (two) times daily with meals.    cholecalciferol, vitamin D3, 125 mcg (5,000 unit) Tab Take 1 tablet (5,000 Units total) by mouth once daily.    EScitalopram oxalate (LEXAPRO) 10 MG tablet Take 1 tablet (10 mg total) by mouth once daily.    furosemide (LASIX) 40 MG tablet Take 1 tablet (40 mg total) by mouth 2 (two) times a day.    sodium bicarbonate 325 MG tablet Take 2 tablets (650 mg total) by mouth 2 (two) times daily.    SYNTHROID 200 mcg tablet Take 1 tablet (200 mcg total) by mouth before breakfast.    calcitRIOL (ROCALTROL) 0.25 MCG Cap Take 1 capsule (0.25 mcg total) by mouth once daily.    levocetirizine (XYZAL) 5 MG tablet Take 1 tablet (5 mg total) by mouth daily as needed for Allergies.    montelukast (SINGULAIR) 5 MG chewable tablet Take 1 tablet (5 mg total) by mouth every evening.    oxyCODONE (ROXICODONE) 5 MG immediate release tablet Take 1 tablet (5 mg total) by mouth every 4 (four) hours as needed for Pain.    traZODone (DESYREL) 50 MG tablet Take 1 to 2 tablets (  mg total) by mouth nightly as needed for Insomnia.     Family History       Problem Relation (Age of Onset)    Aneurysm Brother    Coronary artery disease Mother    Diabetes type II Mother    Heart disease Mother    Hypertension Mother, Father    Prostate cancer Father    Stroke Mother          Tobacco Use    Smoking status: Never    Smokeless tobacco: Never   Substance and Sexual Activity    Alcohol use: Not Currently    Drug use: No    Sexual activity: Not Currently     Review of Systems   Constitutional:  Positive for fever. Negative for diaphoresis.   HENT:  Positive for rhinorrhea. Negative for trouble swallowing.    Eyes:  Negative for pain and redness.   Respiratory:  Positive for shortness of breath. Negative for wheezing.    Cardiovascular:  Positive for leg swelling. Negative for chest pain.   Gastrointestinal:  Positive for nausea. Negative for abdominal pain.   Genitourinary:  Negative for difficulty urinating and dysuria.   Musculoskeletal:  Negative for neck pain and neck stiffness.   Skin:  Positive for wound (postop). Negative for rash.   Neurological:  Negative for seizures and syncope.   Psychiatric/Behavioral:  Positive for confusion. The patient is not nervous/anxious.    Objective:     Vital Signs (Most Recent):  Temp: 98.9 °F (37.2 °C) (02/01/23 1439)  Pulse: 72 (02/01/23 1617)  Resp: 18 (02/01/23 1617)  BP: 133/64 (02/01/23 1617)  SpO2: 98 % (02/01/23 1617) Vital Signs (24h Range):  Temp:  [98.9 °F (37.2 °C)-101 °F (38.3 °C)] 98.9 °F (37.2 °C)  Pulse:  [72-80] 72  Resp:  [12-28] 18  SpO2:  [97 %-100 %] 98 %  BP: (108-141)/(60-77) 133/64     Weight: 104.3 kg (230 lb)  Body mass index is 36.02 kg/m².    Physical Exam  Vitals and nursing note reviewed.   Constitutional:       Appearance: She is well-developed. She is obese. She is not diaphoretic.      Interventions: Nasal cannula in place.   HENT:      Head: Normocephalic and atraumatic.   Eyes:      General: No scleral icterus.      Conjunctiva/sclera: Conjunctivae normal.   Cardiovascular:      Rate and Rhythm: Normal rate and regular rhythm.      Heart sounds: No murmur heard.    No friction rub.   Pulmonary:      Effort: Pulmonary effort is normal. No respiratory distress.      Breath sounds: No stridor. No wheezing.   Abdominal:      General: There is no distension.      Palpations: Abdomen is soft.      Tenderness: There is no abdominal tenderness. There is no guarding.   Musculoskeletal:         General: No signs of injury.      Right lower leg: Edema present.      Left lower leg: Edema present.      Comments: Left AV fistula site dressed   Skin:     General: Skin is warm and dry.      Coloration: Skin is not jaundiced or pale.   Neurological:      Mental Status: She is alert.      Cranial Nerves: No dysarthria.      Motor: No atrophy or seizure activity.   Psychiatric:         Attention and Perception: Attention normal.         Mood and Affect: Mood and affect normal.         Behavior: Behavior is cooperative.           Significant Labs: All pertinent labs within the past 24 hours have been reviewed.    Significant Imaging: I have reviewed all pertinent imaging results/findings within the past 24 hours.  X-Ray Chest AP Portable 2/01/23: FINDINGS:   Mild cardiomegaly.  The lungs are clear.  No pleural effusion

## 2023-02-02 ENCOUNTER — PATIENT OUTREACH (OUTPATIENT)
Dept: ADMINISTRATIVE | Facility: CLINIC | Age: 63
End: 2023-02-02
Payer: MEDICARE

## 2023-02-02 LAB
BACTERIA UR CULT: NO GROWTH
ESTIMATED AVG GLUCOSE: 103 MG/DL (ref 68–131)
HBA1C MFR BLD: 5.2 % (ref 4–5.6)

## 2023-02-02 PROCEDURE — 25000003 PHARM REV CODE 250: Mod: HCNC,NTX | Performed by: HOSPITALIST

## 2023-02-02 PROCEDURE — 25000003 PHARM REV CODE 250: Mod: HCNC,NTX | Performed by: INTERNAL MEDICINE

## 2023-02-02 PROCEDURE — 99232 SBSQ HOSP IP/OBS MODERATE 35: CPT | Mod: HCNC,NTX,, | Performed by: INTERNAL MEDICINE

## 2023-02-02 PROCEDURE — 36415 COLL VENOUS BLD VENIPUNCTURE: CPT | Mod: HCNC,NTX | Performed by: INTERNAL MEDICINE

## 2023-02-02 PROCEDURE — 12000002 HC ACUTE/MED SURGE SEMI-PRIVATE ROOM: Mod: HCNC,NTX

## 2023-02-02 PROCEDURE — 99232 PR SUBSEQUENT HOSPITAL CARE,LEVL II: ICD-10-PCS | Mod: HCNC,NTX,, | Performed by: INTERNAL MEDICINE

## 2023-02-02 PROCEDURE — 83036 HEMOGLOBIN GLYCOSYLATED A1C: CPT | Mod: HCNC,NTX | Performed by: INTERNAL MEDICINE

## 2023-02-02 RX ORDER — ACETAMINOPHEN 500 MG
5000 TABLET ORAL DAILY
Status: DISCONTINUED | OUTPATIENT
Start: 2023-02-03 | End: 2023-02-10 | Stop reason: HOSPADM

## 2023-02-02 RX ORDER — AMLODIPINE BESYLATE 10 MG/1
10 TABLET ORAL NIGHTLY
Status: DISCONTINUED | OUTPATIENT
Start: 2023-02-02 | End: 2023-02-04

## 2023-02-02 RX ADMIN — OXYCODONE HYDROCHLORIDE 5 MG: 5 TABLET ORAL at 08:02

## 2023-02-02 RX ADMIN — ESCITALOPRAM OXALATE 10 MG: 10 TABLET ORAL at 08:02

## 2023-02-02 RX ADMIN — ACETAMINOPHEN 650 MG: 325 TABLET ORAL at 04:02

## 2023-02-02 RX ADMIN — ACETAMINOPHEN 650 MG: 325 TABLET ORAL at 05:02

## 2023-02-02 RX ADMIN — SODIUM BICARBONATE 650 MG: 650 TABLET ORAL at 08:02

## 2023-02-02 RX ADMIN — MONTELUKAST SODIUM 5 MG: 5 TABLET, CHEWABLE ORAL at 08:02

## 2023-02-02 RX ADMIN — AMLODIPINE BESYLATE 10 MG: 10 TABLET ORAL at 08:02

## 2023-02-02 RX ADMIN — FUROSEMIDE 40 MG: 20 TABLET ORAL at 08:02

## 2023-02-02 RX ADMIN — LEVOTHYROXINE SODIUM 200 MCG: 100 TABLET ORAL at 06:02

## 2023-02-02 RX ADMIN — OXYCODONE HYDROCHLORIDE 5 MG: 5 TABLET ORAL at 02:02

## 2023-02-02 NOTE — ED NOTES
Pt care assumed. Report received by ALIVIA Little. Pt lying in stretcher in low and locked position and side rails raised x2. Call light, pt's belongings, and bedside table within pt's reach. Pt on continuous cardiac monitoring, pulse oximetry, and BP cycling every 30 minutes. Pt in NAD and verbalized no needs at this time.

## 2023-02-02 NOTE — ASSESSMENT & PLAN NOTE
She reports symptoms indicative of this including chronic edema, nausea, confusion. Has AV fistula but has not yet started dialysis. Continue home sodium bicarbonate, calcitriol.

## 2023-02-02 NOTE — ASSESSMENT & PLAN NOTE
Vascular Surgery evaluated it since she had a procedure prior to developing shortness of breath and fever.

## 2023-02-02 NOTE — H&P
[facility] Emergency Dept  [facility] Medicine  History & Physical    Patient Name: Kathie Quezada  MRN: [id number]  Patient Class: IP- Inpatient  Admission Date: 2/1/2023  Attending Physician: Elijah Ahuja MD   Primary Care Provider: Elan Price MD         Patient information was obtained from patient, past medical records and ER records.     Subjective:     Principal Problem:Hypoxia    Chief Complaint:   Chief Complaint   Patient presents with    Fatigue     Had L fistula placed yesterday, L arm now swollen and dressing site saturated in blood.         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 She lives in [facility] by herself. She is . Her primary care physician is Dr. Elan Price. Her nephrologist is Dr. Fred Kelly.    She had a left arm AV fistula creation on 10/27/2022. She had balloon angioplasty of the fistula on 12/16/2022. She had transposition of the fistula on 1/31/2023. After the procedure, she felt short of breath and developed fever (measured as 101° Fahrenheit at home the next morning). Shortness of breath was not worsened or relieved by positional changes. She contacted the vascular surgeon, who advised her to go to the emergency department. She went to [facility]. She reported lower extremity edema, memory changes, confusion, poor appetite, and nausea  that had been going on for a long time prior to the procedure. She was found to have hypoxia with oxygen saturation of 88%. Temperature was 101° Fahrenheit. WBC was elevated [place] [address]/uL. Chest X-ray showed clear lungs. Influenza, RSV, and COVID-19 were negative. She was admitted to Hospital Medicine Team B.      Past Medical History:   Diagnosis Date    Acute respiratory failure with hypoxia and hypercapnia 05/26/2019    ATN (acute tubular necrosis) 02/08/2019    Chronic diastolic heart failure 02/07/2019 2-8-19 Mild left atrial enlargement. Mild left ventricular enlargement. Normal left ventricular systolic function. The estimated ejection fraction is 55% Indeterminate left ventricular diastolic function. Normal right ventricular systolic function. Mild mitral regurgitation. Mild to moderate tricuspid regurgitation. The estimated PA systolic pressure is 32 mm Hg Normal central venous pressure (3 m    CKD (chronic kidney disease) stage 4, GFR 15-29 ml/min 05/16/2018    CKD stage G3a/A3, GFR 45-59 and albumin creatinine ratio >300 mg/g 05/16/2018    CKD stage G3b/A3, GFR 30-44 and albumin creatinine ratio >300 mg/g 05/16/2018    Closed compression fracture of L2 lumbar vertebra 05/24/2019    Closed compression fracture of L2 lumbar vertebra 05/24/2019     IMO Regulatory Load October 2019    Controlled type 2 diabetes with retinopathy 04/09/2018    COVID-19 virus infection 7-1-2021 07/01/2021    CVA (cerebral vascular accident) 2017    Diabetic polyneuropathy associated with type 2 diabetes mellitus 12/04/2020    Encounter for blood transfusion     Essential hypertension 04/09/2018    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  7-31-19 Factor XI Activity 55 - 145 % 96       Gastroesophageal reflux disease without esophagitis 05/24/2019    Generalized anxiety disorder 11/08/2019    GERD (gastroesophageal reflux disease)     GIB (gastrointestinal bleeding) 10/18/2018    History  of CVA (cerebrovascular accident) without residual deficits 11/27/2022    History of hepatitis C     s/p succesful Rx w/ [facility]  - SVR12 (cure) 2016    Hypertensive retinopathy, bilateral 12/10/2019    Mild nonproliferative diabetic retinopathy 05/02/2018    Mixed hyperlipidemia 04/09/2018    Myalgia due to statin 09/07/2022    Normocytic anemia 01/14/2022    NS (nuclear sclerosis), bilateral 12/10/2019    PNA (pneumonia) 05/28/2019    Postoperative hypothyroidism     Renovascular hypertension 04/09/2018    S/P kyphoplasty 08/14/2019 8/14/19: L2 kyphoplasty with Dr. Kwon     Slow transit constipation 05/27/2019    Type 2 diabetes mellitus with circulatory disorder, with long-term current use of insulin 04/09/2018    Type 2 diabetes mellitus with stage 3 chronic kidney disease, without long-term current use of insulin 04/09/2018    Type 2 diabetes mellitus with stage 3a chronic kidney disease, without long-term current use of insulin 03/03/2021    Type 2 diabetes mellitus with stage 3b chronic kidney disease, without long-term current use of insulin 09/07/2022       Past Surgical History:   Procedure Laterality Date    AV FISTULA PLACEMENT Left 10/27/2022    Procedure: CREATION, AV FISTULA;  Surgeon: Tirso Tavera MD;  [address]  Service: Peripheral Vascular;  Laterality: Left;  Brachio basilic 1st stage     BACK SURGERY  2017    CHOLECYSTECTOMY  2015    [facility]    COLONOSCOPY  11/13/2015    Dr Chino torres prep. internal hemorrhoids. diverticulosis of sigmois. NO polyps. repeat due 2025    FISTULOGRAM N/A 12/16/2022    Procedure: Fistulogram;  Surgeon: Tirso Tavera MD;  Location: [facility] CATH LAB;  Service: Peripheral Vascular;  Laterality: N/A;    FIXATION KYPHOPLASTY Bilateral 08/14/2019    Procedure: L2 kyphoplasty Fluoro Globus;  Surgeon: Jeremie Kwon DO;  Location: [facility] OR;  Service: Neurosurgery;  Laterality: Bilateral;  GLOBUS MACRINA WINSLOW [phone] TEXTED  HER @ 10:54AM ON 7-30-19  PRE-OP-BY RN  8-7-2019    HYSTERECTOMY      OOPHORECTOMY  1996    one    PERCUTANEOUS TRANSLUMINAL ANGIOPLASTY OF ARTERIOVENOUS FISTULA Left 12/16/2022    Procedure: PTA, AV FISTULA;  Surgeon: Tirso Tavera MD;  Location: [facility] CATH LAB;  Service: Peripheral Vascular;  Laterality: Left;    THYROIDECTOMY  2016    at [facility] for thyroid nodule    TONSILLECTOMY      TRANSPOSITION OF BASILIC VEIN Left 1/31/2023    Procedure: TRANSPOSITION, VEIN, BASILIC;  Surgeon: Tirso Tavera MD;  [facility];  Service: Peripheral Vascular;  Laterality: Left;       Review of patient's allergies indicates:   Allergen Reactions    Atorvastatin Other (See Comments)     Statin induced myalgia    Mushroom Hives    Peanut Anaphylaxis    Bactrim [sulfamethoxazole-trimethoprim] Hives    Gabapentin      Pruritis     Iodine     Peanut butter flavor     Penicillins Hives    Shellfish containing products Itching    Sulfa (sulfonamide antibiotics) Hives       No current facility-administered medications on file prior to encounter.     Current Outpatient Medications on File Prior to Encounter   Medication Sig    amLODIPine (NORVASC) 10 MG tablet Take 1 tablet (10 mg total) by mouth nightly.    carvediloL (COREG) 25 MG tablet Take 1 tablet (25 mg total) by mouth 2 (two) times daily with meals.    cholecalciferol, vitamin D3, 125 mcg (5,000 unit) Tab Take 1 tablet (5,000 Units total) by mouth once daily.    EScitalopram oxalate (LEXAPRO) 10 MG tablet Take 1 tablet (10 mg total) by mouth once daily.    furosemide (LASIX) 40 MG tablet Take 1 tablet (40 mg total) by mouth 2 (two) times a day.    sodium bicarbonate 325 MG tablet Take 2 tablets (650 mg total) by mouth 2 (two) times daily.    SYNTHROID 200 mcg tablet Take 1 tablet (200 mcg total) by mouth before breakfast.    calcitRIOL (ROCALTROL) 0.25 MCG Cap Take 1 capsule (0.25 mcg total) by mouth once daily.    levocetirizine (XYZAL)  5 MG tablet Take 1 tablet (5 mg total) by mouth daily as needed for Allergies.    montelukast (SINGULAIR) 5 MG chewable tablet Take 1 tablet (5 mg total) by mouth every evening.    oxyCODONE (ROXICODONE) 5 MG immediate release tablet Take 1 tablet (5 mg total) by mouth every 4 (four) hours as needed for Pain.    traZODone (DESYREL) 50 MG tablet Take 1 to 2 tablets ( mg total) by mouth nightly as needed for Insomnia.     Family History       Problem Relation (Age of Onset)    Aneurysm Brother    Coronary artery disease Mother    Diabetes type II Mother    Heart disease Mother    Hypertension Mother, Father    Prostate cancer Father    Stroke Mother          Tobacco Use    Smoking status: Never    Smokeless tobacco: Never   Substance and Sexual Activity    Alcohol use: Not Currently    Drug use: No    Sexual activity: Not Currently     Review of Systems   Constitutional:  Positive for fever. Negative for diaphoresis.   HENT:  Positive for rhinorrhea. Negative for trouble swallowing.    Eyes:  Negative for pain and redness.   Respiratory:  Positive for shortness of breath. Negative for wheezing.    Cardiovascular:  Positive for leg swelling. Negative for chest pain.   Gastrointestinal:  Positive for nausea. Negative for abdominal pain.   Genitourinary:  Negative for difficulty urinating and dysuria.   Musculoskeletal:  Negative for neck pain and neck stiffness.   Skin:  Positive for wound (postop). Negative for rash.   Neurological:  Negative for seizures and syncope.   Psychiatric/Behavioral:  Positive for confusion. The patient is not nervous/anxious.    Objective:     Vital Signs (Most Recent):  Temp: 98.9 °F (37.2 °C) (02/01/23 1439)  Pulse: 72 (02/01/23 1617)  Resp: 18 (02/01/23 1617)  BP: 133/64 (02/01/23 1617)  SpO2: 98 % (02/01/23 1617) Vital Signs (24h Range):  Temp:  [98.9 °F (37.2 °C)-101 °F (38.3 °C)] 98.9 °F (37.2 °C)  Pulse:  [72-80] 72  Resp:  [12-28] 18  SpO2:  [97 %-100 %] 98 %  BP:  (108-141)/(60-77) 133/64     Weight: 104.3 kg (230 lb)  Body mass index is 36.02 kg/m².    Physical Exam  Vitals and nursing note reviewed.   Constitutional:       Appearance: She is well-developed. She is obese. She is not diaphoretic.      Interventions: Nasal cannula in place.   HENT:      Head: Normocephalic and atraumatic.   Eyes:      General: No scleral icterus.     Conjunctiva/sclera: Conjunctivae normal.   Cardiovascular:      Rate and Rhythm: Normal rate and regular rhythm.      Heart sounds: No murmur heard.    No friction rub.   Pulmonary:      Effort: Pulmonary effort is normal. No respiratory distress.      Breath sounds: No stridor. No wheezing.   Abdominal:      General: There is no distension.      Palpations: Abdomen is soft.      Tenderness: There is no abdominal tenderness. There is no guarding.   Musculoskeletal:         General: No signs of injury.      Right lower leg: Edema present.      Left lower leg: Edema present.      Comments: Left AV fistula site dressed   Skin:     General: Skin is warm and dry.      Coloration: Skin is not jaundiced or pale.   Neurological:      Mental Status: She is alert.      Cranial Nerves: No dysarthria.      Motor: No atrophy or seizure activity.   Psychiatric:         Attention and Perception: Attention normal.         Mood and Affect: Mood and affect normal.         Behavior: Behavior is cooperative.           Significant Labs: All pertinent labs within the past 24 hours have been reviewed.    Significant Imaging: I have reviewed all pertinent imaging results/findings within the past 24 hours.  X-Ray Chest AP Portable 2/01/23: FINDINGS:   Mild cardiomegaly.  The lungs are clear.  No pleural effusion     Assessment/Plan:     * Hypoxia  Chest X-ray clear. Check chest CT. If no etiology found, check ventilation-perfusion scan to rule out pulmonary embolism.    Postoperative fever  Blood cultures collected. Monitor. Follow up cultures.    A-V fistula  Vascular  Surgery evaluated it since she had a procedure prior to developing shortness of breath and fever.    Chronic allergic rhinitis  Continue home montelukast. She takes levocetirizine.    Chronic kidney disease (CKD), stage V  She reports symptoms indicative of this including chronic edema, nausea, confusion. Has AV fistula but has not yet started dialysis. Continue home sodium bicarbonate, calcitriol.    Controlled type 2 diabetes mellitus with stable proliferative retinopathy of both eyes, unspecified whether long term insulin use  Diet controlled.    Generalized anxiety disorder  Continue home escitalopram.    Factor XI deficiency  Noted.     Chronic diastolic heart failure  Continue home furosemide.    Renovascular hypertension  She takes amlodipine and carvedilol. Blood pressures not that high. Hold for now in case she develops hypotension due to whatever is causing fever. Resume if blood pressures get higher.    Postoperative hypothyroidism  Continue home levothyroxine.      VTE Risk Mitigation (From admission, onward)    None             Elijah Ahuja MD  Department of Hospital Medicine   [facility] - Emergency Dept

## 2023-02-02 NOTE — PROGRESS NOTES
[facility] - Intensive Care ([place] [address])  Vascular Surgery  Progress Note    Patient Name: Kathie Quezada  MRN: [id number]  Admission Date: 2/1/2023  Primary Care Provider: Elan Price MD    Subjective:     Interval History: 2/2: LUE w/o concern for infection with mild ss drainage. UA with bacteria, following urine cultures. No acute vascular intervention. CT chest with likely subsegmental atelectasis    Post-Op Info:  * No surgery found *         2/2: LUE w/o concern for infection. UA with bacteria, following urine cultures. No acute vascular intervention  Medications:  Continuous Infusions:  Scheduled Meds:   EScitalopram oxalate  10 mg Oral Daily    furosemide  40 mg Oral BID    levothyroxine  200 mcg Oral Before breakfast    montelukast  5 mg Oral QHS    sodium bicarbonate  650 mg Oral BID     PRN Meds:acetaminophen, oxyCODONE, traZODone     Objective:     Vital Signs (Most Recent):  Temp: (!) 101.2 °F (38.4 °C) (02/02/23 0418)  Pulse: 85 (02/02/23 0414)  Resp: (!) 23 (02/02/23 0414)  BP: (!) 182/77 (02/02/23 0414)  SpO2: (!) 92 % (02/02/23 0414)   Vital Signs (24h Range):  Temp:  [98.4 °F (36.9 °C)-101.2 °F (38.4 °C)] 101.2 °F (38.4 °C)  Pulse:  [72-85] 85  Resp:  [12-28] 23  SpO2:  [92 %-100 %] 92 %  BP: (108-182)/(60-77) 182/77         Physical Exam  Constitutional:       Appearance: She is well-developed. She is obese. She is not diaphoretic.      Interventions: Nasal cannula in place.   HENT:      Head: Normocephalic and atraumatic.   Eyes:      General: No scleral icterus.     Conjunctiva/sclera: Conjunctivae normal.   Cardiovascular:      Rate and Rhythm: Normal rate and regular rhythm.      Heart sounds: No murmur heard.    No friction rub.   Pulmonary:      Effort: Pulmonary effort is normal. No respiratory distress.      Breath sounds: No stridor. No wheezing.   Abdominal:      General: There is no distension.      Palpations: Abdomen is soft.      Tenderness: There is no abdominal  tenderness. There is no guarding.   Musculoskeletal:         General: No signs of injury.      Right lower leg: Edema present.      Left lower leg: Edema present.      Comments: Left AV fistula site with mild ss drainage o/w intact, dressing removed  Skin:     General: Skin is warm and dry.      Coloration: Skin is not jaundiced or pale.   Neurological:      Mental Status: She is alert.      Cranial Nerves: No dysarthria.      Motor: No atrophy or seizure activity.   Psychiatric:         Attention and Perception: Attention normal.         Mood and Affect: Mood and affect normal.         Behavior: Behavior is cooperative.   Significant Labs:  All pertinent labs from the last 24 hours have been reviewed.    Significant Diagnostics:  I have reviewed all pertinent imaging results/findings within the past 24 hours.  I have reviewed and interpreted all pertinent imaging results/findings within the past 24 hours.  CT Chest Without Contrast    Result Date: 2/1/2023  1. No focal consolidations within the bilateral lungs.  Mild bandlike opacifications in the lung bases, favored to represent scarring or subsegmental atelectasis. 2. Trace bilateral pleural fluid. 3. Cardiomegaly with central vascular congestion and small to moderate volume pericardial fluid, increased in volume when compared with 07/30/2022.  Consider echocardiogram follow-up if not previously performed. 4. Incidental findings discussed in the body of the report. Electronically signed by resident: Vicki Jaquez Date:    02/01/2023 Time:    20:55 Electronically signed by: Omari Gillespie MD Date:    02/01/2023 Time:    23:27    X-Ray Chest AP Portable    Result Date: 2/1/2023  See above Electronically signed by: Ishmael Pérez MD Date:    02/01/2023 Time:    13:32       Assessment/Plan:     A-V fistula  62 year old woman with CKD 5 not yet on dialysis now post op day 1 after baslic vein transposition    No evidence of bleeding or surgical wound complication however  she is on oxygen and had a fever along with new leukocytosis to 13   --Pending AM labs   --CT chest with subsegmental atelectasis likely and cardiomegaly   --UA positive for bacteria, following urine culture          Kumar Pennington MD  Vascular Surgery  [facility] - Intensive Care ([facility]

## 2023-02-02 NOTE — HOSPITAL COURSE
2/2: LUE w/o concern for infection with mild ss drainage. UA with bacteria, following urine cultures. No acute vascular intervention

## 2023-02-02 NOTE — SUBJECTIVE & OBJECTIVE
2/2: JAGDISHE w/o concern for infection. UA with bacteria, following urine cultures. No acute vascular intervention  Medications:  Continuous Infusions:  Scheduled Meds:   EScitalopram oxalate  10 mg Oral Daily    furosemide  40 mg Oral BID    levothyroxine  200 mcg Oral Before breakfast    montelukast  5 mg Oral QHS    sodium bicarbonate  650 mg Oral BID     PRN Meds:acetaminophen, oxyCODONE, traZODone     Objective:     Vital Signs (Most Recent):  Temp: (!) 101.2 °F (38.4 °C) (02/02/23 0418)  Pulse: 85 (02/02/23 0414)  Resp: (!) 23 (02/02/23 0414)  BP: (!) 182/77 (02/02/23 0414)  SpO2: (!) 92 % (02/02/23 0414)   Vital Signs (24h Range):  Temp:  [98.4 °F (36.9 °C)-101.2 °F (38.4 °C)] 101.2 °F (38.4 °C)  Pulse:  [72-85] 85  Resp:  [12-28] 23  SpO2:  [92 %-100 %] 92 %  BP: (108-182)/(60-77) 182/77         Physical Exam  Constitutional:       Appearance: She is well-developed. She is obese. She is not diaphoretic.      Interventions: Nasal cannula in place.   HENT:      Head: Normocephalic and atraumatic.   Eyes:      General: No scleral icterus.     Conjunctiva/sclera: Conjunctivae normal.   Cardiovascular:      Rate and Rhythm: Normal rate and regular rhythm.      Heart sounds: No murmur heard.    No friction rub.   Pulmonary:      Effort: Pulmonary effort is normal. No respiratory distress.      Breath sounds: No stridor. No wheezing.   Abdominal:      General: There is no distension.      Palpations: Abdomen is soft.      Tenderness: There is no abdominal tenderness. There is no guarding.   Musculoskeletal:         General: No signs of injury.      Right lower leg: Edema present.      Left lower leg: Edema present.      Comments: Left AV fistula site with mild ss drainage o/w intact, dressing removed  Skin:     General: Skin is warm and dry.      Coloration: Skin is not jaundiced or pale.   Neurological:      Mental Status: She is alert.      Cranial Nerves: No dysarthria.      Motor: No atrophy or seizure activity.    Psychiatric:         Attention and Perception: Attention normal.         Mood and Affect: Mood and affect normal.         Behavior: Behavior is cooperative.   Significant Labs:  All pertinent labs from the last 24 hours have been reviewed.    Significant Diagnostics:  I have reviewed all pertinent imaging results/findings within the past 24 hours.  I have reviewed and interpreted all pertinent imaging results/findings within the past 24 hours.  CT Chest Without Contrast    Result Date: 2/1/2023  1. No focal consolidations within the bilateral lungs.  Mild bandlike opacifications in the lung bases, favored to represent scarring or subsegmental atelectasis. 2. Trace bilateral pleural fluid. 3. Cardiomegaly with central vascular congestion and small to moderate volume pericardial fluid, increased in volume when compared with 07/30/2022.  Consider echocardiogram follow-up if not previously performed. 4. Incidental findings discussed in the body of the report. Electronically signed by resident: Vicki Jaquez Date:    02/01/2023 Time:    20:55 Electronically signed by: Omari Gillespie MD Date:    02/01/2023 Time:    23:27    X-Ray Chest AP Portable    Result Date: 2/1/2023  See above Electronically signed by: Ishmael Pérez MD Date:    02/01/2023 Time:    13:32

## 2023-02-02 NOTE — ASSESSMENT & PLAN NOTE
62 year old woman with CKD 5 not yet on dialysis now post op day 1 after baslic vein transposition    No evidence of bleeding or surgical wound complication however she is on oxygen and had a fever along with new leukocytosis to 13   --Pending AM labs   --CT chest with subsegmental atelectasis likely and cardiomegaly   --UA positive for bacteria, following urine culture

## 2023-02-02 NOTE — PLAN OF CARE
[facility] ([place] [address])  Initial Discharge Assessment       Primary Care Provider: Elan Price MD    Admission Diagnosis: Shortness of breath [R06.02]  Hypoxia [R09.02]  Fever [R50.9]    Admission Date: 2/1/2023  Expected Discharge Date: 2/4/2023    Discharge Barriers Identified: (P) None    Payor: HU[employer]NA MANAGED MEDICARE / Plan: HUMANA SNP (SPECIAL NEEDS PLAN) / Product Type: Medicare Advantage /     Extended Emergency Contact Information  Primary Emergency Contact: Miles Quezada  Address: [address] [place] [address] [facility]  Home Phone: [phone]  Relation: Son  Secondary Emergency Contact: Mike Quezada   [facility]  Home Phone: [phone]  Relation: Daughter    Discharge Plan A: (P) Home  Discharge Plan B: (P) Home      [facility]  [address]  [place] [address]  Phone: [phone] Fax: [phone]    [id number] [address] AT [facility]  [address]  [facility] [address]  Phone: [phone] Fax: [phone]    [facility]  [address] [facility] [address]  Phone: [phone] Fax: [phone]      Initial Assessment (most recent)       Adult Discharge Assessment - 02/02/23 1552          Discharge Assessment    Assessment Type Discharge Planning Assessment (P)      Confirmed/corrected address, phone number and insurance Yes (P)      Confirmed Demographics Correct on Facesheet (P)      Source of Information patient (P)      Reason For Admission SOB FEVER (P)      People in Home alone (P)      Do you expect to return to your current living situation? Yes (P)      Do you have help at home or someone to help you manage your care at home? Yes (P)      Who are your caregiver(s) and their phone number(s)? FAMILY (P)      Prior to hospitilization cognitive status: Alert/Oriented (P)      Current cognitive status:  Alert/Oriented (P)      Equipment Currently Used at Home none (P)      Readmission within 30 days? No (P)      Patient currently being followed by outpatient case management? No (P)      Do you currently have service(s) that help you manage your care at home? No (P)      Do you take prescription medications? Yes (P)      Do you have prescription coverage? Yes (P)      Coverage HU[employer]NA (P)      Do you have any problems affording any of your prescribed medications? No (P)      Is the patient taking medications as prescribed? yes (P)      Who is going to help you get home at discharge? FAMILY (P)      How do you get to doctors appointments? family or friend will provide (P)      Are you on dialysis? No (P)      Do you take coumadin? No (P)      Discharge Plan A Home (P)      Discharge Plan B Home (P)      DME Needed Upon Discharge  none (P)      Discharge Plan discussed with: Patient (P)      Discharge Barriers Identified None (P)                           PT LIVES ALONE HAS A HD FISTULA BUT HAS NOT STARTED HD ANTICIPATE PT WILL DC HOME WITH NO NEEDS CM WILL CONT TO MONITOR

## 2023-02-02 NOTE — ASSESSMENT & PLAN NOTE
Chest X-ray clear. Check chest CT. If no etiology found, check ventilation-perfusion scan to rule out pulmonary embolism.

## 2023-02-02 NOTE — ASSESSMENT & PLAN NOTE
She takes amlodipine and carvedilol. Blood pressures not that high. Hold for now in case she develops hypotension due to whatever is causing fever. Resume if blood pressures get higher.

## 2023-02-03 PROBLEM — J96.01 ACUTE HYPOXEMIC RESPIRATORY FAILURE: Status: ACTIVE | Noted: 2023-02-01

## 2023-02-03 LAB
ANION GAP SERPL CALC-SCNC: 13 MMOL/L (ref 8–16)
BASOPHILS # BLD AUTO: 0.06 K/UL (ref 0–0.2)
BASOPHILS NFR BLD: 0.5 % (ref 0–1.9)
BUN SERPL-MCNC: 41 MG/DL (ref 6–20)
CALCIUM SERPL-MCNC: 7.9 MG/DL (ref 8.7–10.5)
CHLORIDE SERPL-SCNC: 105 MMOL/L (ref 95–110)
CO2 SERPL-SCNC: 16 MMOL/L (ref 23–29)
CREAT SERPL-MCNC: 4.6 MG/DL (ref 0.5–1.4)
DIFFERENTIAL METHOD: ABNORMAL
EOSINOPHIL # BLD AUTO: 0.1 K/UL (ref 0–0.5)
EOSINOPHIL NFR BLD: 1 % (ref 0–8)
ERYTHROCYTE [DISTWIDTH] IN BLOOD BY AUTOMATED COUNT: 14.3 % (ref 11.5–14.5)
EST. GFR  (NO RACE VARIABLE): 10.3 ML/MIN/1.73 M^2
GLUCOSE SERPL-MCNC: 64 MG/DL (ref 70–110)
HCT VFR BLD AUTO: 27.8 % (ref 37–48.5)
HGB BLD-MCNC: 8.6 G/DL (ref 12–16)
IMM GRANULOCYTES # BLD AUTO: 0.07 K/UL (ref 0–0.04)
IMM GRANULOCYTES NFR BLD AUTO: 0.6 % (ref 0–0.5)
LYMPHOCYTES # BLD AUTO: 2.4 K/UL (ref 1–4.8)
LYMPHOCYTES NFR BLD: 21.7 % (ref 18–48)
MCH RBC QN AUTO: 24.9 PG (ref 27–31)
MCHC RBC AUTO-ENTMCNC: 30.9 G/DL (ref 32–36)
MCV RBC AUTO: 81 FL (ref 82–98)
MONOCYTES # BLD AUTO: 1 K/UL (ref 0.3–1)
MONOCYTES NFR BLD: 8.6 % (ref 4–15)
NEUTROPHILS # BLD AUTO: 7.5 K/UL (ref 1.8–7.7)
NEUTROPHILS NFR BLD: 67.6 % (ref 38–73)
NRBC BLD-RTO: 0 /100 WBC
PLATELET # BLD AUTO: 136 K/UL (ref 150–450)
PMV BLD AUTO: 10.8 FL (ref 9.2–12.9)
POCT GLUCOSE: 69 MG/DL (ref 70–110)
POCT GLUCOSE: 78 MG/DL (ref 70–110)
POCT GLUCOSE: 89 MG/DL (ref 70–110)
POTASSIUM SERPL-SCNC: 4.3 MMOL/L (ref 3.5–5.1)
RBC # BLD AUTO: 3.45 M/UL (ref 4–5.4)
SODIUM SERPL-SCNC: 134 MMOL/L (ref 136–145)
WBC # BLD AUTO: 11.04 K/UL (ref 3.9–12.7)

## 2023-02-03 PROCEDURE — 99232 SBSQ HOSP IP/OBS MODERATE 35: CPT | Mod: HCNC,NTX,, | Performed by: INTERNAL MEDICINE

## 2023-02-03 PROCEDURE — 25000003 PHARM REV CODE 250: Mod: HCNC,NTX | Performed by: HOSPITALIST

## 2023-02-03 PROCEDURE — 27000221 HC OXYGEN, UP TO 24 HOURS: Mod: HCNC,NTX

## 2023-02-03 PROCEDURE — 94799 UNLISTED PULMONARY SVC/PX: CPT | Mod: HCNC,NTX

## 2023-02-03 PROCEDURE — 36415 COLL VENOUS BLD VENIPUNCTURE: CPT | Mod: HCNC,NTX | Performed by: INTERNAL MEDICINE

## 2023-02-03 PROCEDURE — 85025 COMPLETE CBC W/AUTO DIFF WBC: CPT | Mod: HCNC,NTX | Performed by: INTERNAL MEDICINE

## 2023-02-03 PROCEDURE — 94761 N-INVAS EAR/PLS OXIMETRY MLT: CPT | Mod: HCNC,NTX

## 2023-02-03 PROCEDURE — 99900035 HC TECH TIME PER 15 MIN (STAT): Mod: HCNC,NTX

## 2023-02-03 PROCEDURE — 12000002 HC ACUTE/MED SURGE SEMI-PRIVATE ROOM: Mod: HCNC,NTX

## 2023-02-03 PROCEDURE — 80048 BASIC METABOLIC PNL TOTAL CA: CPT | Mod: HCNC,NTX | Performed by: INTERNAL MEDICINE

## 2023-02-03 PROCEDURE — 25000003 PHARM REV CODE 250: Mod: HCNC,NTX | Performed by: INTERNAL MEDICINE

## 2023-02-03 PROCEDURE — 99232 PR SUBSEQUENT HOSPITAL CARE,LEVL II: ICD-10-PCS | Mod: HCNC,NTX,, | Performed by: INTERNAL MEDICINE

## 2023-02-03 RX ORDER — CARVEDILOL 25 MG/1
25 TABLET ORAL 2 TIMES DAILY WITH MEALS
Status: DISCONTINUED | OUTPATIENT
Start: 2023-02-03 | End: 2023-02-03

## 2023-02-03 RX ORDER — HYDRALAZINE HYDROCHLORIDE 20 MG/ML
5 INJECTION INTRAMUSCULAR; INTRAVENOUS EVERY 8 HOURS PRN
Status: DISCONTINUED | OUTPATIENT
Start: 2023-02-03 | End: 2023-02-10 | Stop reason: HOSPADM

## 2023-02-03 RX ADMIN — SODIUM BICARBONATE 650 MG: 650 TABLET ORAL at 08:02

## 2023-02-03 RX ADMIN — MONTELUKAST SODIUM 5 MG: 5 TABLET, CHEWABLE ORAL at 08:02

## 2023-02-03 RX ADMIN — CHOLECALCIFEROL TAB 125 MCG (5000 UNIT) 5000 UNITS: 125 TAB at 09:02

## 2023-02-03 RX ADMIN — ESCITALOPRAM OXALATE 10 MG: 10 TABLET ORAL at 09:02

## 2023-02-03 RX ADMIN — SODIUM BICARBONATE 650 MG: 650 TABLET ORAL at 09:02

## 2023-02-03 RX ADMIN — FUROSEMIDE 40 MG: 20 TABLET ORAL at 09:02

## 2023-02-03 RX ADMIN — LEVOTHYROXINE SODIUM 200 MCG: 100 TABLET ORAL at 05:02

## 2023-02-03 RX ADMIN — FUROSEMIDE 40 MG: 20 TABLET ORAL at 08:02

## 2023-02-03 RX ADMIN — AMLODIPINE BESYLATE 10 MG: 10 TABLET ORAL at 08:02

## 2023-02-03 RX ADMIN — OXYCODONE HYDROCHLORIDE 5 MG: 5 TABLET ORAL at 09:02

## 2023-02-03 RX ADMIN — OXYCODONE HYDROCHLORIDE 5 MG: 5 TABLET ORAL at 05:02

## 2023-02-03 NOTE — PROGRESS NOTES
[facility] ([address] Medicine  Progress Note    Patient Name: Kathie Quezada  MRN: [id number]  Patient Class: IP- Inpatient   Admission Date: 2/1/2023  Length of Stay: 1 days  Attending Physician: Elan Alarcon MD  Primary Care Provider: Elan Price MD        Subjective:     Principal Problem:Hypoxia    Interval History:  62 year old Black woman with obesity, hypertension, diabetes mellitus type 2 with retinopathy and polyneuropathy, chronic diastolic heart failure, chronic kidney disease stage 5, anemia, history of stroke, history of thyroidectomy in 2016 with postoperative hypothyroidism, 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 She had balloon angioplasty of the fistula on 12/16/2022. She had transposition of the fistula on 1/31/2023. Post procedure she felt SOB and hypoxic with saturation of 99 and fevers. Mild Leucocytosis.  Imaging was not suggestive of infiltrates.  COVID, influenza and RSV unremarkable.    Patient had temperature of a 101.2 at 4:00 a.m. this morning, since and has been afebrile.  She reports that she passes urine and urinalysis not firmly suggestive of UTI.  Blood and urine cultures no growth thus far.  Patient admits to feeling weak and has pain to the dressing site    Review of Systems   Constitutional:  Positive for activity change, chills and fever.   Respiratory:  Negative for cough and shortness of breath.    Cardiovascular:  Negative for chest pain.   Gastrointestinal:  Negative for abdominal pain.   Neurological:  Positive for weakness.   Objective:     Vital Signs (Most Recent):  Temp: 100.3 °F (37.9 °C)  (02/02/23 1718)  Pulse: 78 (02/02/23 1649)  Resp: 18 (02/02/23 1649)  BP: (!) 163/74 (02/02/23 1649)  SpO2: 99 % (02/02/23 1649)   Vital Signs (24h Range):  Temp:  [98.2 °F (36.8 °C)-101.2 °F (38.4 °C)] 100.3 °F (37.9 °C)  Pulse:  [72-85] 78  Resp:  [16-23] 18  SpO2:  [92 %-99 %] 99 %  BP: (147-182)/(69-77) 163/74     Weight: 104.3 kg (230 lb)  Body mass index is 36.02 kg/m².  No intake or output data in the 24 hours ending 02/02/23 2001   Physical Exam  Constitutional:       General: She is in acute distress (discomfort).      Appearance: She is obese.   HENT:      Head: Normocephalic.      Right Ear: External ear normal.      Left Ear: External ear normal.      Nose: Nose normal.      Comments: Nasal Cannula  Eyes:      General: No scleral icterus.  Cardiovascular:      Rate and Rhythm: Normal rate.      Heart sounds: Normal heart sounds.   Pulmonary:      Breath sounds: Normal breath sounds.   Abdominal:      Palpations: Abdomen is soft.      Tenderness: There is no abdominal tenderness.   Genitourinary:     Comments: PureWick catheter  Skin:     Comments: Dressings removed to left upper extremity, staples intact.  Skin was sensitive however no tenderness, erythema or fluctuance palpated.   Neurological:      General: No focal deficit present.      Mental Status: She is alert and oriented to person, place, and time.   Psychiatric:         Mood and Affect: Mood normal.       Assessment/Plan:      Active Diagnoses:    Diagnosis Date Noted POA    PRINCIPAL PROBLEM:  Hypoxia [R09.02] 02/01/2023 Yes    Postoperative fever [R50.82] 02/01/2023 Yes    A-V fistula [I77.0] 01/29/2023 Yes    Chronic allergic rhinitis [J30.9] 01/04/2023 Yes     Chronic    Chronic kidney disease (CKD), stage V [N18.5] 09/20/2022 Yes     Chronic    Type 2 diabetes mellitus with stage 5 chronic kidney disease not on chronic dialysis, without long-term current use of insulin [E11.22, N18.5] 09/07/2022 Yes     Chronic    Controlled type 2  diabetes mellitus with stable proliferative retinopathy of both eyes, unspecified whether long term insulin use [E11.3553]  Yes     Chronic    Diabetic polyneuropathy associated with type 2 diabetes mellitus [E11.42] 12/04/2020 Yes     Chronic    Generalized anxiety disorder [F41.1] 11/08/2019 Yes     Chronic    Chronic diastolic heart failure [I50.32] 02/07/2019 Yes     Chronic    Postoperative hypothyroidism [E89.0] 04/09/2018 Yes     Chronic    Renovascular hypertension [I15.0] 04/09/2018 Yes     Chronic    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] 01/01/1978 Yes     Chronic      Problems Resolved During this Admission:     VTE Risk Mitigation (From admission, onward)      None          Postop fever and hypoxia.  Query atelectasis.  We will do incentive spirometry and continue supplemental oxygen.  We will obtain V/Q scan.  Cultures thus far no growth, no malignant rise in white cell count.    A.m. labs  History of factor 11 deficiency, we will hold anticoagulation at this time.  Chronic kidney disease with recent intervention to left upper extremity fistula, has not started dialysis as yet.  Continue home meds  Hba1C  Hypertension.  We will resume home meds cautiously    SCDs for DVT prophylaxis.  Fall precaution    Elan Alarcon MD  Department of Hospital Medicine   [facility] - Intensive Care ([place]

## 2023-02-03 NOTE — PROGRESS NOTES
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 She had balloon angioplasty of the fistula on 12/16/2022. She had transposition of the fistula on 1/31/2023. Post procedure she felt SOB and hypoxic with saturation of 99 and fevers. Mild Leucocytosis.  Imaging was not suggestive of infiltrates.  COVID, influenza and RSV unremarkable    Patient was ordered for incentive spirometry.  She remained afebrile overnight.  Oxygen was discontinued today.  No complaints at this time.  Relatives updated at bedside.  Leukocytosis and hyperkalemia resolved.  Urine cultures no growth, blood cultures no growth thus far.  V/Q scan low probability for PE    Review of Systems   Unable to perform ROS: Other   Objective:     Vital Signs (Most Recent):  Temp: 99 °F (37.2 °C) (02/03/23 0805)  Pulse: 76 (02/03/23 1042)  Resp: 18 (02/03/23 1042)  BP: (!) 142/64 (02/03/23 0948)  SpO2: 98 % (02/03/23 1042)   Vital Signs (24h Range):  Temp:  [98.5 °F (36.9  °C)-100.3 °F (37.9 °C)] 99 °F (37.2 °C)  Pulse:  [76-82] 76  Resp:  [17-22] 18  SpO2:  [88 %-99 %] 98 %  BP: (142-187)/(64-81) 142/64     Weight: 104.3 kg (230 lb)  Body mass index is 36.02 kg/m².    Intake/Output Summary (Last 24 hours) at 2/3/2023 1501  Last data filed at 2/3/2023 0551  Gross per 24 hour   Intake 200 ml   Output --   Net 200 ml      Physical Exam  Constitutional:       General: She is not in acute distress.     Appearance: She is obese.   HENT:      Head: Normocephalic.      Right Ear: External ear normal.      Left Ear: External ear normal.      Nose: Nose normal.   Eyes:      General: No scleral icterus.  Cardiovascular:      Rate and Rhythm: Normal rate.   Pulmonary:      Breath sounds: Normal breath sounds.   Abdominal:      Palpations: Abdomen is soft.      Tenderness: There is no abdominal tenderness.   Musculoskeletal:      Comments: Dressing to left upper extremity   Skin:     General: Skin is warm.   Neurological:      Mental Status: She is alert and oriented to person, place, and time.   Psychiatric:         Mood and Affect: Mood normal.             Significant Labs: All pertinent labs within the past 24 hours have been reviewed.        Assessment/Plan:      Active Diagnoses:    Diagnosis Date Noted POA    PRINCIPAL PROBLEM:  Acute hypoxemic respiratory failure [J96.01] 02/01/2023 Yes    Postoperative fever [R50.82] 02/01/2023 Yes    A-V fistula [I77.0] 01/29/2023 Yes    Chronic allergic rhinitis [J30.9] 01/04/2023 Yes     Chronic    Chronic kidney disease (CKD), stage V [N18.5] 09/20/2022 Yes     Chronic    Type 2 diabetes mellitus with stage 5 chronic kidney disease not on chronic dialysis, without long-term current use of insulin [E11.22, N18.5] 09/07/2022 Yes     Chronic    Controlled type 2 diabetes mellitus with stable proliferative retinopathy of both eyes, unspecified whether long term insulin use [E11.3553]  Yes     Chronic    Diabetic polyneuropathy associated with type 2  diabetes mellitus [E11.42] 12/04/2020 Yes     Chronic    Generalized anxiety disorder [F41.1] 11/08/2019 Yes     Chronic    Chronic diastolic heart failure [I50.32] 02/07/2019 Yes     Chronic    Postoperative hypothyroidism [E89.0] 04/09/2018 Yes     Chronic    Renovascular hypertension [I15.0] 04/09/2018 Yes     Chronic    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] 01/01/1978 Yes     Chronic      Problems Resolved During this Admission:     VTE Risk Mitigation (From admission, onward)           Ordered     Place sequential compression device  Until discontinued         02/02/23 2012                  Postop fever and hypoxia.  Likely diagnosis atelectasis.  Continue aggressive incentive spirometry.  Patient remaining afebrile, will see how she does on room air   History of factor 11 deficiency, we will avoid anticoagulation at this time.  Chronic kidney disease with recent intervention to left upper extremity fistula, has not started dialysis as yet.  Continue home meds  Hba1C normal  Hypertension.  We will resume home meds cautiously     SCDs for DVT prophylaxis.  Fall precaution       Elan Alarcon MD  Department of Hospital Medicine   [facility] - Intensive Care ([place])

## 2023-02-04 PROBLEM — Z75.8 DISCHARGE PLANNING ISSUES: Status: ACTIVE | Noted: 2023-02-04

## 2023-02-04 PROBLEM — Z02.9 DISCHARGE PLANNING ISSUES: Status: ACTIVE | Noted: 2023-02-04

## 2023-02-04 PROBLEM — Z91.010 PEANUT ALLERGY: Status: ACTIVE | Noted: 2023-02-04

## 2023-02-04 PROBLEM — M89.8X9 METABOLIC BONE DISEASE: Status: ACTIVE | Noted: 2023-02-04

## 2023-02-04 LAB
ALBUMIN SERPL BCP-MCNC: 3.1 G/DL (ref 3.5–5.2)
ANION GAP SERPL CALC-SCNC: 14 MMOL/L (ref 8–16)
BASOPHILS # BLD AUTO: 0.07 K/UL (ref 0–0.2)
BASOPHILS NFR BLD: 0.6 % (ref 0–1.9)
BUN SERPL-MCNC: 47 MG/DL (ref 6–20)
CALCIUM SERPL-MCNC: 8.2 MG/DL (ref 8.7–10.5)
CHLORIDE SERPL-SCNC: 103 MMOL/L (ref 95–110)
CO2 SERPL-SCNC: 16 MMOL/L (ref 23–29)
CREAT SERPL-MCNC: 4.2 MG/DL (ref 0.5–1.4)
DIFFERENTIAL METHOD: ABNORMAL
EOSINOPHIL # BLD AUTO: 0.1 K/UL (ref 0–0.5)
EOSINOPHIL NFR BLD: 1.2 % (ref 0–8)
ERYTHROCYTE [DISTWIDTH] IN BLOOD BY AUTOMATED COUNT: 14.1 % (ref 11.5–14.5)
EST. GFR  (NO RACE VARIABLE): 11.5 ML/MIN/1.73 M^2
GLUCOSE SERPL-MCNC: 81 MG/DL (ref 70–110)
HCT VFR BLD AUTO: 28.3 % (ref 37–48.5)
HGB BLD-MCNC: 8.6 G/DL (ref 12–16)
IMM GRANULOCYTES # BLD AUTO: 0.07 K/UL (ref 0–0.04)
IMM GRANULOCYTES NFR BLD AUTO: 0.6 % (ref 0–0.5)
LYMPHOCYTES # BLD AUTO: 1.7 K/UL (ref 1–4.8)
LYMPHOCYTES NFR BLD: 14.4 % (ref 18–48)
MAGNESIUM SERPL-MCNC: 1.8 MG/DL (ref 1.6–2.6)
MCH RBC QN AUTO: 24.7 PG (ref 27–31)
MCHC RBC AUTO-ENTMCNC: 30.4 G/DL (ref 32–36)
MCV RBC AUTO: 81 FL (ref 82–98)
MONOCYTES # BLD AUTO: 1 K/UL (ref 0.3–1)
MONOCYTES NFR BLD: 8.7 % (ref 4–15)
NEUTROPHILS # BLD AUTO: 8.9 K/UL (ref 1.8–7.7)
NEUTROPHILS NFR BLD: 74.5 % (ref 38–73)
NRBC BLD-RTO: 0 /100 WBC
PHOSPHATE SERPL-MCNC: 5.5 MG/DL (ref 2.7–4.5)
PLATELET # BLD AUTO: 117 K/UL (ref 150–450)
PMV BLD AUTO: 10.8 FL (ref 9.2–12.9)
POTASSIUM SERPL-SCNC: 4.4 MMOL/L (ref 3.5–5.1)
RBC # BLD AUTO: 3.48 M/UL (ref 4–5.4)
SARS-COV-2 RNA RESP QL NAA+PROBE: NOT DETECTED
SODIUM SERPL-SCNC: 133 MMOL/L (ref 136–145)
WBC # BLD AUTO: 11.9 K/UL (ref 3.9–12.7)

## 2023-02-04 PROCEDURE — 99900035 HC TECH TIME PER 15 MIN (STAT): Mod: HCNC,NTX

## 2023-02-04 PROCEDURE — 12000002 HC ACUTE/MED SURGE SEMI-PRIVATE ROOM: Mod: HCNC,NTX

## 2023-02-04 PROCEDURE — 80069 RENAL FUNCTION PANEL: CPT | Mod: HCNC,NTX | Performed by: INTERNAL MEDICINE

## 2023-02-04 PROCEDURE — 94761 N-INVAS EAR/PLS OXIMETRY MLT: CPT | Mod: HCNC,NTX

## 2023-02-04 PROCEDURE — 99223 1ST HOSP IP/OBS HIGH 75: CPT | Mod: HCNC,NTX,, | Performed by: INTERNAL MEDICINE

## 2023-02-04 PROCEDURE — 27000221 HC OXYGEN, UP TO 24 HOURS: Mod: HCNC,NTX

## 2023-02-04 PROCEDURE — 25000003 PHARM REV CODE 250: Mod: HCNC,NTX | Performed by: HOSPITALIST

## 2023-02-04 PROCEDURE — 87040 BLOOD CULTURE FOR BACTERIA: CPT | Mod: HCNC,NTX | Performed by: INTERNAL MEDICINE

## 2023-02-04 PROCEDURE — 25000003 PHARM REV CODE 250: Mod: HCNC,NTX | Performed by: INTERNAL MEDICINE

## 2023-02-04 PROCEDURE — 83735 ASSAY OF MAGNESIUM: CPT | Mod: HCNC,NTX | Performed by: INTERNAL MEDICINE

## 2023-02-04 PROCEDURE — 85025 COMPLETE CBC W/AUTO DIFF WBC: CPT | Mod: HCNC,NTX | Performed by: INTERNAL MEDICINE

## 2023-02-04 PROCEDURE — 99223 PR INITIAL HOSPITAL CARE,LEVL III: ICD-10-PCS | Mod: HCNC,NTX,, | Performed by: INTERNAL MEDICINE

## 2023-02-04 PROCEDURE — 99232 PR SUBSEQUENT HOSPITAL CARE,LEVL II: ICD-10-PCS | Mod: HCNC,95,NTX, | Performed by: INTERNAL MEDICINE

## 2023-02-04 PROCEDURE — 99232 SBSQ HOSP IP/OBS MODERATE 35: CPT | Mod: HCNC,95,NTX, | Performed by: INTERNAL MEDICINE

## 2023-02-04 PROCEDURE — U0005 INFEC AGEN DETEC AMPLI PROBE: HCPCS | Performed by: INTERNAL MEDICINE

## 2023-02-04 PROCEDURE — 36415 COLL VENOUS BLD VENIPUNCTURE: CPT | Mod: HCNC,NTX | Performed by: INTERNAL MEDICINE

## 2023-02-04 RX ORDER — SODIUM BICARBONATE 650 MG/1
1300 TABLET ORAL 3 TIMES DAILY
Status: DISCONTINUED | OUTPATIENT
Start: 2023-02-04 | End: 2023-02-08

## 2023-02-04 RX ORDER — GUAIFENESIN 600 MG/1
600 TABLET, EXTENDED RELEASE ORAL 2 TIMES DAILY
Status: DISCONTINUED | OUTPATIENT
Start: 2023-02-04 | End: 2023-02-10 | Stop reason: HOSPADM

## 2023-02-04 RX ORDER — SEVELAMER CARBONATE 800 MG/1
800 TABLET, FILM COATED ORAL
Status: DISCONTINUED | OUTPATIENT
Start: 2023-02-04 | End: 2023-02-08

## 2023-02-04 RX ORDER — NIFEDIPINE 30 MG/1
30 TABLET, EXTENDED RELEASE ORAL NIGHTLY
Status: DISCONTINUED | OUTPATIENT
Start: 2023-02-04 | End: 2023-02-06

## 2023-02-04 RX ORDER — CARVEDILOL 6.25 MG/1
6.25 TABLET ORAL 2 TIMES DAILY WITH MEALS
Status: DISCONTINUED | OUTPATIENT
Start: 2023-02-04 | End: 2023-02-05

## 2023-02-04 RX ADMIN — CARVEDILOL 6.25 MG: 6.25 TABLET, FILM COATED ORAL at 05:02

## 2023-02-04 RX ADMIN — FUROSEMIDE 40 MG: 20 TABLET ORAL at 09:02

## 2023-02-04 RX ADMIN — ESCITALOPRAM OXALATE 10 MG: 10 TABLET ORAL at 08:02

## 2023-02-04 RX ADMIN — FUROSEMIDE 40 MG: 20 TABLET ORAL at 08:02

## 2023-02-04 RX ADMIN — OXYCODONE HYDROCHLORIDE 5 MG: 5 TABLET ORAL at 04:02

## 2023-02-04 RX ADMIN — NIFEDIPINE 30 MG: 30 TABLET, FILM COATED, EXTENDED RELEASE ORAL at 09:02

## 2023-02-04 RX ADMIN — LEVOTHYROXINE SODIUM 200 MCG: 100 TABLET ORAL at 05:02

## 2023-02-04 RX ADMIN — SODIUM BICARBONATE 1300 MG: 650 TABLET ORAL at 09:02

## 2023-02-04 RX ADMIN — MONTELUKAST SODIUM 5 MG: 5 TABLET, CHEWABLE ORAL at 09:02

## 2023-02-04 RX ADMIN — OXYCODONE HYDROCHLORIDE 5 MG: 5 TABLET ORAL at 08:02

## 2023-02-04 RX ADMIN — SEVELAMER CARBONATE 800 MG: 800 TABLET, FILM COATED ORAL at 05:02

## 2023-02-04 RX ADMIN — SODIUM BICARBONATE 650 MG: 650 TABLET ORAL at 08:02

## 2023-02-04 RX ADMIN — CHOLECALCIFEROL TAB 125 MCG (5000 UNIT) 5000 UNITS: 125 TAB at 08:02

## 2023-02-04 RX ADMIN — GUAIFENESIN 600 MG: 600 TABLET, EXTENDED RELEASE ORAL at 09:02

## 2023-02-04 RX ADMIN — SODIUM BICARBONATE 1300 MG: 650 TABLET ORAL at 04:02

## 2023-02-04 NOTE — SUBJECTIVE & OBJECTIVE
Interval History: [facility] follow up visit for suspected Shortness of breath [R06.02]  Hypoxia [R09.02]  Fever [R50.9] present on admission.   This service was provided by telemedicine.    Patient was transferred to [facility] on: 2/4/2023  The patient location is: [facility]/[address] [address]   Admitted 2/1/2023 12:25 PM    The patient is able to provide adequate history. Additional history was obtained from: chart review.  No significant events overnight reported.  Patient reports complaints of weakness  Symptoms are stable since yesterday.     I have reviewed the following on 2/4/2023:     Details     [x]   Lab results  H&H stable    []   Micro reports     []   Pathology reports     []   Imaging reports     []   Cardiology Procedure reports     []   Outside records/CareEverywhere     []  Independently viewed:       Review of Systems   Constitutional:  Positive for fatigue. Negative for fever.     Objective:     Vital Signs (Most Recent):  Temp: 97.8 °F (36.6 °C) (02/04/23 1207)  Pulse: 76 (02/04/23 1207)  Resp: 18 (02/04/23 1207)  BP: (!) 161/70 (02/04/23 1207)  SpO2: 100 % (02/04/23 1207)   Vital Signs (24h Range):  Temp:  [97.8 °F (36.6 °C)-99.5 °F (37.5 °C)] 97.8 °F (36.6 °C)  Pulse:  [76-80] 76  Resp:  [18-20] 18  SpO2:  [93 %-100 %] 100 %  BP: (126-180)/(60-81) 161/70     Weight: 104.3 kg (230 lb)  Body mass index is 36.02 kg/m².    Intake/Output Summary (Last 24 hours) at 2/4/2023 1216  Last data filed at 2/4/2023 0611  Gross per 24 hour   Intake 478 ml   Output --   Net 478 ml      Physical Exam  Constitutional:       Appearance: She is obese.   Cardiovascular:      Comments: Monitor and/or Vital signs reviewed at time of visit  Pulmonary:      Effort: Pulmonary effort is normal. No accessory muscle usage or respiratory distress.   Musculoskeletal:      Right lower leg: Edema present.      Left lower leg: Edema present.   Neurological:      Mental Status: She is alert. She is not disoriented.   Psychiatric:          Attention and Perception: Attention normal.         Mood and Affect: Affect normal.         Behavior: Behavior is cooperative.         Cognition and Memory: Memory is impaired.       Significant Labs:   Recent Labs   Lab 09/07/22  1047 11/17/22  0813 02/02/23  2130   HGBA1C 6.8* 5.5 5.2     Recent Labs   Lab 02/03/23  0748 02/03/23  0835 02/03/23  2100   POCTGLUCOSE 69* 78 89     Recent Labs   Lab 02/01/23  1302 02/01/23  1302 02/03/23  0424 02/04/23  0556   WBC 13.64*  --  11.04 11.90   HGB 8.9*  --  8.6* 8.6*   HCT 30.6* 29* 27.8* 28.3*     --  136* 117*     Recent Labs   Lab 02/01/23  1302 02/03/23  0424 02/04/23  0556   GRAN 72.4  9.9* 67.6  7.5 74.5*  8.9*   LYMPH 16.9*  2.3 21.7  2.4 14.4*  1.7   MONO 8.7  1.2* 8.6  1.0 8.7  1.0   EOS 0.1 0.1 0.1     Recent Labs   Lab 02/01/23  1302 02/03/23  0424 02/04/23  0556    134* 133*   K 5.6* 4.3 4.4    105 103   CO2 18* 16* 16*   BUN 33* 41* 47*   CREATININE 4.4* 4.6* 4.2*   GLU 85 64* 81   CALCIUM 7.7* 7.9* 8.2*   ALBUMIN 3.4*  --  3.1*   MG  --   --  1.8   PHOS  --   --  5.5*     Recent Labs   Lab 02/01/23  1302   ALKPHOS 69   ALT 5*   AST 22   PROT 7.4   BILITOT 0.3     Recent Labs   Lab 02/01/23  1302   TROPONINI 0.010     Recent Labs   Lab 12/19/22  1031 01/27/23  1004   FERRITIN 108 61     Results for orders placed or performed in visit on 01/27/23   Vitamin D   Result Value Ref Range    Vit D, 25-Hydroxy 43 30 - 96 ng/mL     SARS-CoV2 (COVID-19) Qualitative PCR (no units)   Date Value   02/04/2023 Not Detected   02/01/2023 Not Detected     SARS-CoV-2 RNA, Amplification, Qual (no units)   Date Value   10/23/2020 Negative     POC Rapid COVID (no units)   Date Value   07/01/2021 Positive (A)       ECG Results              EKG 12-lead (Final result)  Result time 02/01/23 15:13:56      Final result by Interface, Lab In [facility] (02/01/23 15:13:56)                   Narrative:    Test Reason : R06.02,    Vent. Rate : 076 BPM      Atrial Rate : 076 BPM     P-R Int : 196 ms          QRS Dur : 092 ms      QT Int : 394 ms       P-R-T Axes : 051 044 042 degrees     QTc Int : 443 ms    Normal sinus rhythm  Low anterior forces  Abnormal ECG  When compared with ECG of 31-JAN-2023 13:45,  No significant change was found  Confirmed by Mark TREJO MD ([id number]) on 2/1/2023 3:13:50 PM    Referred By: [email or web address]   SELF           Confirmed By:Mark TREJO MD                                    Results for orders placed during the hospital encounter of 09/11/22    Echo    Interpretation Summary  · Severe left atrial enlargement.  · The left ventricle is normal in size with mild eccentric hypertrophy and normal systolic function.  · The estimated ejection fraction is 65%.  · Indeterminate left ventricular diastolic function.  · Normal right ventricular size with normal right ventricular systolic function.  · Mild mitral regurgitation.  · Normal central venous pressure (3 mmHg).      NM Lung Scan Ventilation Perfusion  Narrative: EXAMINATION:  NM LUNG VENTILATION AND PERFUSION IMAGING    CLINICAL HISTORY:  Pulmonary embolism (PE) suspected, high prob;    TECHNIQUE:  40.2 mCi of Tc-99m-DTPA were placed in the nebulizer. Following the inhalation Tc-99m-DTPA in aerosol and the subsequent IV administration of 4.7 mCi of Tc-99m-MAA, multiple images of the thorax were obtained in various projections.    COMPARISON:  CT chest 02/01/2023    FINDINGS:  Perfusion: No unmatched segmental or subsegmental defects.  Overall perfusion is better than ventilation.    Ventilation: Prominent tracer deposition in the trachea and proximal airways, likely poor inspiratory effort versus reactive airways disease.  Additionally there is uptake in the esophagus and stomach related to tracer ingestion.  Impression: This represents a low probability of pulmonary embolism.    I, Varun Price MD, attest that I reviewed and interpreted the images.    Electronically signed by resident: Torsten  Franc  Date:    02/03/2023  Time:    13:12    Electronically signed by: Varun Price  Date:    02/03/2023  Time:    13:24      Labs and Imaging within the last 24 hours listed above were reviewed.

## 2023-02-04 NOTE — SUBJECTIVE & OBJECTIVE
Past Medical History:   Diagnosis Date    Acute respiratory failure with hypoxia and hypercapnia 05/26/2019    ATN (acute tubular necrosis) 02/08/2019    Chronic diastolic heart failure 02/07/2019    2-8-19 Mild left atrial enlargement. Mild left ventricular enlargement. Normal left ventricular systolic function. The estimated ejection fraction is 55% Indeterminate left ventricular diastolic function. Normal right ventricular systolic function. Mild mitral regurgitation. Mild to moderate tricuspid regurgitation. The estimated PA systolic pressure is 32 mm Hg Normal central venous pressure (3 m    CKD (chronic kidney disease) stage 4, GFR 15-29 ml/min 05/16/2018    CKD stage G3a/A3, GFR 45-59 and albumin creatinine ratio >300 mg/g 05/16/2018    CKD stage G3b/A3, GFR 30-44 and albumin creatinine ratio >300 mg/g 05/16/2018    Closed compression fracture of L2 lumbar vertebra 05/24/2019    Closed compression fracture of L2 lumbar vertebra 05/24/2019     IMO Regulatory Load October 2019    Controlled type 2 diabetes with retinopathy 04/09/2018    COVID-19 virus infection 7-1-2021 07/01/2021    CVA (cerebral vascular accident) 2017    Diabetic polyneuropathy associated with type 2 diabetes mellitus 12/04/2020    Encounter for blood transfusion     Essential hypertension 04/09/2018    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  7-31-19 Factor XI Activity 55 - 145 % 96       Gastroesophageal reflux disease without esophagitis 05/24/2019    Generalized anxiety disorder 11/08/2019    GERD (gastroesophageal reflux disease)     GIB (gastrointestinal bleeding) 10/18/2018    History of CVA (cerebrovascular accident) without residual deficits 11/27/2022    History of hepatitis C     s/p succesful Rx w/ Porsha  - SVR12 (cure) 2016    Hypertensive retinopathy, bilateral 12/10/2019    Mild nonproliferative diabetic retinopathy 05/02/2018    Mixed hyperlipidemia 04/09/2018    Myalgia due to statin 09/07/2022     Normocytic anemia 01/14/2022    NS (nuclear sclerosis), bilateral 12/10/2019    PNA (pneumonia) 05/28/2019    Postoperative hypothyroidism     Renovascular hypertension 04/09/2018    S/P kyphoplasty 08/14/2019 8/14/19: L2 kyphoplasty with Dr. Kwon     Slow transit constipation 05/27/2019    Type 2 diabetes mellitus with circulatory disorder, with long-term current use of insulin 04/09/2018    Type 2 diabetes mellitus with stage 3 chronic kidney disease, without long-term current use of insulin 04/09/2018    Type 2 diabetes mellitus with stage 3a chronic kidney disease, without long-term current use of insulin 03/03/2021    Type 2 diabetes mellitus with stage 3b chronic kidney disease, without long-term current use of insulin 09/07/2022       Past Surgical History:   Procedure Laterality Date    AV FISTULA PLACEMENT Left 10/27/2022    Procedure: CREATION, AV FISTULA;  Surgeon: Tirso Tavera MD;  Location: [address];  Service: Peripheral Vascular;  Laterality: Left;  Brachio basilic 1st stage     BACK SURGERY  2017    CHOLECYSTECTOMY  2015    Touro    COLONOSCOPY  11/13/2015    Dr Chino torres prep. internal hemorrhoids. diverticulosis of sigmois. NO polyps. repeat due 2025    FISTULOGRAM N/A 12/16/2022    Procedure: Fistulogram;  Surgeon: Tirso Tavera MD;  Location: [facility] CATH LAB;  Service: Peripheral Vascular;  Laterality: N/A;    FIXATION KYPHOPLASTY Bilateral 08/14/2019    Procedure: L2 kyphoplasty Fluoro Globus;  Surgeon: Jeremie Kwon DO;  Location: [facility] OR;  Service: Neurosurgery;  Laterality: Bilateral;  GLOBUS MACRINA AGOSTOQUINN [phone] TEXTED HER @ 10:54AM ON 7-30-19  PRE-OP-BY RN  8-7-2019    HYSTERECTOMY      OOPHORECTOMY  1996    one    PERCUTANEOUS TRANSLUMINAL ANGIOPLASTY OF ARTERIOVENOUS FISTULA Left 12/16/2022    Procedure: PTA, AV FISTULA;  Surgeon: Tirso Tavera MD;  Location: [facility] CATH LAB;  Service: Peripheral Vascular;  Laterality: Left;    THYROIDECTOMY  2016    at [facility]  for thyroid nodule    TONSILLECTOMY      TRANSPOSITION OF BASILIC VEIN Left 1/31/2023    Procedure: TRANSPOSITION, VEIN, BASILIC;  Surgeon: Tirso Tavera MD;  Location: [facility] OR [address] [place] [address];  Service: Peripheral Vascular;  Laterality: Left;       Review of patient's allergies indicates:   Allergen Reactions    Atorvastatin Other (See Comments)     Statin induced myalgia    Mushroom Hives    Peanut Anaphylaxis    Bactrim [sulfamethoxazole-trimethoprim] Hives    Gabapentin      Pruritis     Iodine     Peanut butter flavor     Penicillins Hives    Shellfish containing products Itching    Sulfa (sulfonamide antibiotics) Hives     Current Facility-Administered Medications   Medication Frequency    acetaminophen tablet 650 mg Q6H PRN    amLODIPine tablet 10 mg Nightly    cholecalciferol (vitamin D3) 125 mcg (5,000 unit) tablet 5,000 Units Daily    EScitalopram oxalate tablet 10 mg Daily    furosemide tablet 40 mg BID    hydrALAZINE injection 5 mg Q8H PRN    levothyroxine tablet 200 mcg Before breakfast    montelukast chewable tablet 5 mg QHS    oxyCODONE immediate release tablet 5 mg Q4H PRN    sodium bicarbonate tablet 650 mg BID    traZODone tablet 50 mg Nightly PRN     Family History       Problem Relation (Age of Onset)    Aneurysm Brother    Coronary artery disease Mother    Diabetes type II Mother    Heart disease Mother    Hypertension Mother, Father    Prostate cancer Father    Stroke Mother          Tobacco Use    Smoking status: Never    Smokeless tobacco: Never   Substance and Sexual Activity    Alcohol use: Not Currently    Drug use: No    Sexual activity: Not Currently     Review of Systems   Constitutional:  Positive for fatigue. Negative for fever.   Respiratory:  Positive for shortness of breath. Negative for wheezing.    Cardiovascular:  Positive for leg swelling. Negative for chest pain.   Gastrointestinal:  Positive for abdominal pain.   Genitourinary:  Negative for decreased urine volume, difficulty  urinating, dysuria and hematuria.   Musculoskeletal:         Arm pain   Objective:     Vital Signs (Most Recent):  Temp: 98.2 °F (36.8 °C) (02/04/23 0808)  Pulse: 77 (02/04/23 0808)  Resp: 20 (02/04/23 0808)  BP: (!) 180/81 (02/04/23 0808)  SpO2: 99 % (02/04/23 0808)   Vital Signs (24h Range):  Temp:  [98.2 °F (36.8 °C)-99.5 °F (37.5 °C)] 98.2 °F (36.8 °C)  Pulse:  [77-80] 77  Resp:  [18-20] 20  SpO2:  [93 %-100 %] 99 %  BP: (126-180)/(60-81) 180/81     Weight: 104.3 kg (230 lb) (02/01/23 1224)  Body mass index is 36.02 kg/m².  Body surface area is 2.22 meters squared.    I/O last 3 completed shifts:  In: 798 [P.O.:798]  Out: -     Physical Exam  Vitals and nursing note reviewed.   Constitutional:       Appearance: She is well-developed. She is obese. She is not diaphoretic.      Interventions: Nasal cannula in place.   HENT:      Head: Normocephalic and atraumatic.   Eyes:      General: No scleral icterus.     Conjunctiva/sclera: Conjunctivae normal.   Cardiovascular:      Rate and Rhythm: Normal rate and regular rhythm.      Heart sounds: No murmur heard.    No friction rub.   Pulmonary:      Effort: Pulmonary effort is normal. No respiratory distress.      Breath sounds: No stridor. No wheezing or rales.   Abdominal:      General: There is no distension.      Palpations: Abdomen is soft.      Tenderness: There is no abdominal tenderness. There is no guarding.   Musculoskeletal:         General: No signs of injury.      Right lower leg: Edema present.      Left lower leg: Edema present.      Comments: Left AV fistula site dressing in place   Skin:     General: Skin is warm and dry.      Coloration: Skin is not jaundiced or pale.   Neurological:      Mental Status: She is alert.      Cranial Nerves: No dysarthria.      Motor: No atrophy or seizure activity.   Psychiatric:         Attention and Perception: Attention normal.         Mood and Affect: Mood and affect normal.         Behavior: Behavior is cooperative.        Significant Labs:  CBC:   Recent Labs   Lab 02/04/23  0556   WBC 11.90   RBC 3.48*   HGB 8.6*   HCT 28.3*   *   MCV 81*   MCH 24.7*   MCHC 30.4*     CMP:   Recent Labs   Lab 02/01/23  1302 02/03/23  0424 02/04/23  0556   GLU 85   < > 81   CALCIUM 7.7*   < > 8.2*   ALBUMIN 3.4*  --  3.1*   PROT 7.4  --   --       < > 133*   K 5.6*   < > 4.4   CO2 18*   < > 16*      < > 103   BUN 33*   < > 47*   CREATININE 4.4*   < > 4.2*   ALKPHOS 69  --   --    ALT 5*  --   --    AST 22  --   --    BILITOT 0.3  --   --     < > = values in this interval not displayed.         Significant Imaging:  Labs: Reviewed

## 2023-02-04 NOTE — CONSULTS
[facility] ([place] [id number])  Nephrology  Consult Note    Patient Name: Kathie Quezada  MRN: [id number]  Admission Date: 2/1/2023  Hospital Length of Stay: 3 days  Attending Provider: Arlene Paul MD   Primary Care Physician: Elan Price MD  Principal Problem:Acute hypoxemic respiratory failure    Inpatient consult to Nephrology  Consult performed by: Shama Olson MD  Consult ordered by: Arlene Paul MD        Subjective:     HPI:  Kathie Quezada 61 yo F with PMHX of obesity, hypertension, diabetes mellitus type 2 (A1c 5.2) with retinopathy and polyneuropathy, chronic diastolic heart failure (EF 65%), CKD V not yet initiated on HD, hx of stroke, s/p thyroidectomy in 2016 with postoperative hypothyroidism, 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 She was admitted 1 day after transposition of her AVF by vascular surgery on 1/31/23. She had AVF creation back in 10/27/2022. After the procedure, she felt SOB, developed fever of 101 and came to the ED. She reported lower extremity edema, memory changes, confusion, poor appetite, and nausea that had been going on for a long time prior to the procedure. Also noted to have leukocytosis with WBC count 13 which is now normal. She was treated for post op atelectasis which was seen on CT chest w/o contrast with incentive spirometry. VQ perfusion scan revealed low probability for PE. Blood cultures with NGTD, fever trending down, covid/flu/rsv negative.     Nephrology consulted for ARUNA on CKD5. She follows with  for nephrology, last seen in 1/27/23. Prior to that, followed with Dr. Kimball. Currently also undergoing transplant evaluation. Presumed to have CKD 2/2 to HTN and diabetes. She had received clindamycin for surgical prophylaxis, but no other abx. Baseline Cr appears to  be around 3 to 3.6 with progression over the last few years to CKD V. Her creatinine trend has been 3.6-->4.4-->4.6--4.2 as of 2/4/22. Previous workup in 2018 revealed negative JB, ANCA, HIV, C3/C4. She is Hep C antibody + s/p treatment with Harvoni in 2016. Hep B core/surface Ab +, surface Ag negative. Previous kappa/lambda ration was 1.61, not diagnostic of myeloma in context of CKD. She previously had nephrotic range proteinuria around 4.5 (2018) that has since decreased to 1.37 (May 2019). PTH was 342 (1/27/23), increased from 283 before, for which she was started on calcitriol by Dr. Kelly. She states that she has not yet picked it up and started it prior to hospitalization. Unclear why her lisinopril was stopped. She reports fatigue and arm pain. Denies NSAID use, dysuria, hematuria. She reports poor BP control at home with BP running in 180s/90s. CXR revealed cardiomegaly but no pleural effusion. Significant labs include BUN 47/Cr 4.2, improving, bicarb 16, K wnl, phos 5.5.           Past Medical History:   Diagnosis Date    Acute respiratory failure with hypoxia and hypercapnia 05/26/2019    ATN (acute tubular necrosis) 02/08/2019    Chronic diastolic heart failure 02/07/2019    2-8-19 Mild left atrial enlargement. Mild left ventricular enlargement. Normal left ventricular systolic function. The estimated ejection fraction is 55% Indeterminate left ventricular diastolic function. Normal right ventricular systolic function. Mild mitral regurgitation. Mild to moderate tricuspid regurgitation. The estimated PA systolic pressure is 32 mm Hg Normal central venous pressure (3 m    CKD (chronic kidney disease) stage 4, GFR 15-29 ml/min 05/16/2018    CKD stage G3a/A3, GFR 45-59 and albumin creatinine ratio >300 mg/g 05/16/2018    CKD stage G3b/A3, GFR 30-44 and albumin creatinine ratio >300 mg/g 05/16/2018    Closed compression fracture of L2 lumbar vertebra 05/24/2019    Closed compression fracture of L2 lumbar  vertebra 05/24/2019     IMO Regulatory Load October 2019    Controlled type 2 diabetes with retinopathy 04/09/2018    COVID-19 virus infection 7-1-2021 07/01/2021    CVA (cerebral vascular accident) 2017    Diabetic polyneuropathy associated with type 2 diabetes mellitus 12/04/2020    Encounter for blood transfusion     Essential hypertension 04/09/2018    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  7-31-19 Factor XI Activity 55 - 145 % 96       Gastroesophageal reflux disease without esophagitis 05/24/2019    Generalized anxiety disorder 11/08/2019    GERD (gastroesophageal reflux disease)     GIB (gastrointestinal bleeding) 10/18/2018    History of CVA (cerebrovascular accident) without residual deficits 11/27/2022    History of hepatitis C     s/p succesful Rx w/ Harvoni  - SVR12 (cure) 2016    Hypertensive retinopathy, bilateral 12/10/2019    Mild nonproliferative diabetic retinopathy 05/02/2018    Mixed hyperlipidemia 04/09/2018    Myalgia due to statin 09/07/2022    Normocytic anemia 01/14/2022    NS (nuclear sclerosis), bilateral 12/10/2019    PNA (pneumonia) 05/28/2019    Postoperative hypothyroidism     Renovascular hypertension 04/09/2018    S/P kyphoplasty 08/14/2019 8/14/19: L2 kyphoplasty with Dr. Kwon     Slow transit constipation 05/27/2019    Type 2 diabetes mellitus with circulatory disorder, with long-term current use of insulin 04/09/2018    Type 2 diabetes mellitus with stage 3 chronic kidney disease, without long-term current use of insulin 04/09/2018    Type 2 diabetes mellitus with stage 3a chronic kidney disease, without long-term current use of insulin 03/03/2021    Type 2 diabetes mellitus with stage 3b chronic kidney disease, without long-term current use of insulin 09/07/2022       Past Surgical History:   Procedure Laterality Date    AV FISTULA PLACEMENT Left 10/27/2022    Procedure: CREATION, AV FISTULA;  Surgeon: Tirso Tavera MD;  Location: [facility] OR [address] [place] [address];   Service: Peripheral Vascular;  Laterality: Left;  Brachio basilic 1st stage     BACK SURGERY  2017    CHOLECYSTECTOMY  2015    Touro    COLONOSCOPY  11/13/2015    Dr Chino torres prep. internal hemorrhoids. diverticulosis of sigmois. NO polyps. repeat due 2025    FISTULOGRAM N/A 12/16/2022    Procedure: Fistulogram;  Surgeon: Tirso Tavera MD;  Location: [facility] CATH LAB;  Service: Peripheral Vascular;  Laterality: N/A;    FIXATION KYPHOPLASTY Bilateral 08/14/2019    Procedure: L2 kyphoplasty Fluoro Globus;  Surgeon: Jeremie Kwon DO;  Location: [facility] OR;  Service: Neurosurgery;  Laterality: Bilateral;  GLOBUS MACRINA WINSLOW [phone] TEXTED HER @ 10:54AM ON 7-30-19  PRE-OP-BY RN  8-7-2019    HYSTERECTOMY      OOPHORECTOMY  1996    one    PERCUTANEOUS TRANSLUMINAL ANGIOPLASTY OF ARTERIOVENOUS FISTULA Left 12/16/2022    Procedure: PTA, AV FISTULA;  Surgeon: Tirso Tavera MD;  Location: [facility] CATH LAB;  Service: Peripheral Vascular;  Laterality: Left;    THYROIDECTOMY  2016    at [facility] for thyroid nodule    TONSILLECTOMY      TRANSPOSITION OF BASILIC VEIN Left 1/31/2023    Procedure: TRANSPOSITION, VEIN, BASILIC;  Surgeon: Tirso Tavera MD;  Location: [address];  Service: Peripheral Vascular;  Laterality: Left;       Review of patient's allergies indicates:   Allergen Reactions    Atorvastatin Other (See Comments)     Statin induced myalgia    Mushroom Hives    Peanut Anaphylaxis    Bactrim [sulfamethoxazole-trimethoprim] Hives    Gabapentin      Pruritis     Iodine     Peanut butter flavor     Penicillins Hives    Shellfish containing products Itching    Sulfa (sulfonamide antibiotics) Hives     Current Facility-Administered Medications   Medication Frequency    acetaminophen tablet 650 mg Q6H PRN    amLODIPine tablet 10 mg Nightly    cholecalciferol (vitamin D3) 125 mcg (5,000 unit) tablet 5,000 Units Daily    EScitalopram oxalate tablet 10 mg Daily    furosemide tablet 40 mg BID    hydrALAZINE  injection 5 mg Q8H PRN    levothyroxine tablet 200 mcg Before breakfast    montelukast chewable tablet 5 mg QHS    oxyCODONE immediate release tablet 5 mg Q4H PRN    sodium bicarbonate tablet 650 mg BID    traZODone tablet 50 mg Nightly PRN     Family History       Problem Relation (Age of Onset)    Aneurysm Brother    Coronary artery disease Mother    Diabetes type II Mother    Heart disease Mother    Hypertension Mother, Father    Prostate cancer Father    Stroke Mother          Tobacco Use    Smoking status: Never    Smokeless tobacco: Never   Substance and Sexual Activity    Alcohol use: Not Currently    Drug use: No    Sexual activity: Not Currently     Review of Systems   Constitutional:  Positive for fatigue. Negative for fever.   Respiratory:  Positive for shortness of breath. Negative for wheezing.    Cardiovascular:  Positive for leg swelling. Negative for chest pain.   Gastrointestinal:  Positive for abdominal pain.   Genitourinary:  Negative for decreased urine volume, difficulty urinating, dysuria and hematuria.   Musculoskeletal:         Arm pain   Objective:     Vital Signs (Most Recent):  Temp: 98.2 °F (36.8 °C) (02/04/23 0808)  Pulse: 77 (02/04/23 0808)  Resp: 20 (02/04/23 0808)  BP: (!) 180/81 (02/04/23 0808)  SpO2: 99 % (02/04/23 0808)   Vital Signs (24h Range):  Temp:  [98.2 °F (36.8 °C)-99.5 °F (37.5 °C)] 98.2 °F (36.8 °C)  Pulse:  [77-80] 77  Resp:  [18-20] 20  SpO2:  [93 %-100 %] 99 %  BP: (126-180)/(60-81) 180/81     Weight: 104.3 kg (230 lb) (02/01/23 1224)  Body mass index is 36.02 kg/m².  Body surface area is 2.22 meters squared.    I/O last 3 completed shifts:  In: 798 [P.O.:798]  Out: -     Physical Exam  Vitals and nursing note reviewed.   Constitutional:       Appearance: She is well-developed. She is obese. She is not diaphoretic.      Interventions: Nasal cannula in place.   HENT:      Head: Normocephalic and atraumatic.   Eyes:      General: No scleral icterus.      Conjunctiva/sclera: Conjunctivae normal.   Cardiovascular:      Rate and Rhythm: Normal rate and regular rhythm.      Heart sounds: No murmur heard.    No friction rub.   Pulmonary:      Effort: Pulmonary effort is normal. No respiratory distress.      Breath sounds: No stridor. No wheezing or rales.   Abdominal:      General: There is no distension.      Palpations: Abdomen is soft.      Tenderness: There is no abdominal tenderness. There is no guarding.   Musculoskeletal:         General: No signs of injury.      Right lower leg: Edema present.      Left lower leg: Edema present.      Comments: Left AV fistula site dressing in place   Skin:     General: Skin is warm and dry.      Coloration: Skin is not jaundiced or pale.   Neurological:      Mental Status: She is alert.      Cranial Nerves: No dysarthria.      Motor: No atrophy or seizure activity.   Psychiatric:         Attention and Perception: Attention normal.         Mood and Affect: Mood and affect normal.         Behavior: Behavior is cooperative.       Significant Labs:  CBC:   Recent Labs   Lab 02/04/23  0556   WBC 11.90   RBC 3.48*   HGB 8.6*   HCT 28.3*   *   MCV 81*   MCH 24.7*   MCHC 30.4*     CMP:   Recent Labs   Lab 02/01/23  1302 02/03/23  0424 02/04/23  0556   GLU 85   < > 81   CALCIUM 7.7*   < > 8.2*   ALBUMIN 3.4*  --  3.1*   PROT 7.4  --   --       < > 133*   K 5.6*   < > 4.4   CO2 18*   < > 16*      < > 103   BUN 33*   < > 47*   CREATININE 4.4*   < > 4.2*   ALKPHOS 69  --   --    ALT 5*  --   --    AST 22  --   --    BILITOT 0.3  --   --     < > = values in this interval not displayed.         Significant Imaging:  Labs: Reviewed    Assessment/Plan:     Chronic kidney disease (CKD), stage V  63 yo F with CKD 5 2/2 to DM and poorly controlled HTN not yet started on HD with ARUNA. Previous GN workup was negative. Has AVF, s/p transposition. Would need line if starting HD. Reports some fatigue, but no emergent need for HD for  electrolytes, clearance or volume status. Cr trending down from 4.6-->4.2. Creatinine 3.6 on admit, baseline around 3-3.6. Phos elevated at 5.5, bicarb 16    UPCr 1.37 (5/2019). Previously as high as 4.5.    (1/27/23)  UA: 2+ protein, 1+blood, 22 WBC  RP US: (11/17/22): medical renal disease    Lab Results   Component Value Date    CREATININE 4.2 (H) 02/04/2023       Plan:   - increase bicarb to 1300 TID, add sevelamer 800 TID  - home dose lasix 40 PO BID ok  - Significant amount of proteinuria seen previously, would recommend outpatient ACEi or ARB  - Can hold calcitriol while inpatient  - Strict I&Os and daily weights   - Daily BMP  - Trend sCr  - Avoid nephrotoxic agents such as NSAIDs, gadolinium, ACEi, ARBs and IV radiocontrast.  - Renally dose meds to current GFR.  - Maintain MAP > 65.  - No indications for HD at this time.   - Maintain electrolytes at goal: Mg > 2, Phos > 3, K > 4.      Renovascular hypertension  Would recommend better BP control. Consider switching amlodipine to nifedipine or adding coreg.       Thank you for your consult. I will follow-up with patient. Please contact us if you have any additional questions.    Shama Olson MD  Nephrology  [facility] ([id number])

## 2023-02-04 NOTE — ASSESSMENT & PLAN NOTE
61 yo F with CKD 5 2/2 to DM and poorly controlled HTN not yet started on HD with ARUNA. Previous GN workup was negative. Unclear why she was taken off lisinopril in the past. Currently also undergoing transplant evaluation. Has AVF, s/p transposition. Would need line if starting HD. Reports some fatigue, but no emergent need for HD for electrolytes, clearance or volume status. Cr trending down from 4.6-->4.2. Creatinine 3.6 on admit, baseline around 3-3.6. Phos elevated at 5.5, bicarb 16    UPCr 1.37 (5/2019). Previously as high as 4.5.    (1/27/23)  UA: 2+ protein, 1+blood, 22 WBC  RP US: (11/17/22): medical renal disease    Lab Results   Component Value Date    CREATININE 4.2 (H) 02/04/2023       Plan:   - continue bicarb to 1300 TID and sevelamer 800 TID  - Increase lasix to 80 IV BID given increased oxygen requirements  - Significant amount of proteinuria seen previously, would recommend outpatient ACEi or ARB  - Can hold calcitriol while inpatient  - Strict I&Os and daily weights   - Daily BMP  - Trend sCr  - Avoid nephrotoxic agents such as NSAIDs, gadolinium, ACEi, ARBs and IV radiocontrast.  - Renally dose meds to current GFR.  - Maintain MAP > 65.  - No indications for HD at this time.   - Maintain electrolytes at goal: Mg > 2, Phos > 3, K > 4.

## 2023-02-04 NOTE — HPI
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 She was admitted 1 day after transposition of her AVF by vascular surgery on 1/31/23. She had AVF creation back in 10/27/2022. After the procedure, she felt SOB, developed fever of 101 and came to the ED. She reported lower extremity edema, memory changes, confusion, poor appetite, and nausea that had been going on for a long time prior to the procedure. Also noted to have leukocytosis with WBC count 13 which is now normal. She was treated for post op atelectasis which was seen on CT chest w/o contrast with incentive spirometry. VQ perfusion scan revealed low probability for PE. Blood cultures with NGTD, fever trending down, covid/flu/rsv negative.     Nephrology consulted for ARUNA on CKD5. She follows with  for nephrology, last seen in 1/27/23. Prior to that, followed with Dr. Kimball. Currently also undergoing transplant evaluation. Presumed to have CKD 2/2 to HTN and diabetes. She had received clindamycin for surgical prophylaxis, but no other abx. Baseline Cr appears to be around 3 to 3.6 with progression over the last few years to CKD V. Her creatinine trend has been 3.6-->4.4-->4.6--4.2 as of 2/4/22. Previous workup in 2018 revealed negative JB, ANCA, HIV, C3/C4. She is Hep C antibody + s/p treatment with Harvoni in 2016. Hep B core/surface Ab +, surface Ag negative. Previous kappa/lambda ration was 1.61, not diagnostic of myeloma in context of CKD. She previously had nephrotic range proteinuria around 4.5 (2018) that has  since decreased to 1.37 (May 2019). PTH was 342 (1/27/23), increased from 283 before, for which she was started on calcitriol by Dr. Kelly. She states that she has not yet picked it up and started it prior to hospitalization. Unclear why her lisinopril was stopped. She reports fatigue and arm pain. Denies NSAID use, dysuria, hematuria. She reports poor BP control at home with BP running in 180s/90s. CXR revealed cardiomegaly but no pleural effusion. Significant labs include BUN 47/Cr 4.2, improving, bicarb 16, K wnl, phos 5.5.

## 2023-02-04 NOTE — ASSESSMENT & PLAN NOTE
63 yo F with CKD 5 2/2 to DM and poorly controlled HTN not yet started on HD with ARUNA. Previous GN workup was negative. Has AVF, s/p transposition. Would need line if starting HD. Reports some fatigue, but no emergent need for HD for electrolytes, clearance or volume status. Cr trending down from 4.6-->4.2. Creatinine 3.6 on admit, baseline around 3-3.6. Phos elevated at 5.5, bicarb 16    UPCr 1.37 (5/2019). Previously as high as 4.5.    (1/27/23)  UA: 2+ protein, 1+blood, 22 WBC  RP US: (11/17/22): medical renal disease    Lab Results   Component Value Date    CREATININE 4.2 (H) 02/04/2023       Plan:   - increase bicarb to 1300 TID, add sevelamer 800 TID  - home dose lasix 40 PO BID ok  - Significant amount of proteinuria seen previously, would recommend outpatient ACEi or ARB  - Can hold calcitriol while inpatient  - Strict I&Os and daily weights   - Daily BMP  - Trend sCr  - Avoid nephrotoxic agents such as NSAIDs, gadolinium, ACEi, ARBs and IV radiocontrast.  - Renally dose meds to current GFR.  - Maintain MAP > 65.  - No indications for HD at this time.   - Maintain electrolytes at goal: Mg > 2, Phos > 3, K > 4.

## 2023-02-04 NOTE — CONSULTS
[facility] ([place] [address])  Hospital Medicine  Telemedicine Consult Note    Patient Name: Kathie Quezada  MRN: [id number]  Admission Date: 2/1/2023  Hospital Length of Stay: 2 days  Attending Physician: Elan Alarcon MD   Primary Care Provider: Elan Price MD         Thank you for your consult to [facility]. We have reviewed the patient chart. This patient does meet criteria for [facility] service at this time. Will assume care on 02/04/23 at 7AM.      Arlene Paul MD  Department of Hospital Medicine   [facility] ([place])

## 2023-02-05 LAB
ALBUMIN SERPL BCP-MCNC: 3 G/DL (ref 3.5–5.2)
ANION GAP SERPL CALC-SCNC: 13 MMOL/L (ref 8–16)
BUN SERPL-MCNC: 50 MG/DL (ref 6–20)
CALCIUM SERPL-MCNC: 8.2 MG/DL (ref 8.7–10.5)
CHLORIDE SERPL-SCNC: 101 MMOL/L (ref 95–110)
CO2 SERPL-SCNC: 21 MMOL/L (ref 23–29)
CREAT SERPL-MCNC: 4.3 MG/DL (ref 0.5–1.4)
EST. GFR  (NO RACE VARIABLE): 11.2 ML/MIN/1.73 M^2
GLUCOSE SERPL-MCNC: 149 MG/DL (ref 70–110)
PHOSPHATE SERPL-MCNC: 5.2 MG/DL (ref 2.7–4.5)
POTASSIUM SERPL-SCNC: 4.1 MMOL/L (ref 3.5–5.1)
SODIUM SERPL-SCNC: 135 MMOL/L (ref 136–145)

## 2023-02-05 PROCEDURE — 25000003 PHARM REV CODE 250: Mod: HCNC,NTX | Performed by: INTERNAL MEDICINE

## 2023-02-05 PROCEDURE — 99233 PR SUBSEQUENT HOSPITAL CARE,LEVL III: ICD-10-PCS | Mod: HCNC,95,NTX, | Performed by: INTERNAL MEDICINE

## 2023-02-05 PROCEDURE — 25000003 PHARM REV CODE 250: Mod: HCNC,NTX | Performed by: HOSPITALIST

## 2023-02-05 PROCEDURE — 80069 RENAL FUNCTION PANEL: CPT | Mod: HCNC,NTX | Performed by: STUDENT IN AN ORGANIZED HEALTH CARE EDUCATION/TRAINING PROGRAM

## 2023-02-05 PROCEDURE — 36415 COLL VENOUS BLD VENIPUNCTURE: CPT | Mod: HCNC,NTX | Performed by: STUDENT IN AN ORGANIZED HEALTH CARE EDUCATION/TRAINING PROGRAM

## 2023-02-05 PROCEDURE — 12000002 HC ACUTE/MED SURGE SEMI-PRIVATE ROOM: Mod: HCNC,NTX

## 2023-02-05 PROCEDURE — 99233 SBSQ HOSP IP/OBS HIGH 50: CPT | Mod: HCNC,95,NTX, | Performed by: INTERNAL MEDICINE

## 2023-02-05 RX ORDER — POLYETHYLENE GLYCOL 3350 17 G/17G
17 POWDER, FOR SOLUTION ORAL 2 TIMES DAILY
Status: DISCONTINUED | OUTPATIENT
Start: 2023-02-05 | End: 2023-02-09

## 2023-02-05 RX ORDER — AMOXICILLIN 250 MG
1 CAPSULE ORAL 2 TIMES DAILY
Status: DISCONTINUED | OUTPATIENT
Start: 2023-02-05 | End: 2023-02-09

## 2023-02-05 RX ORDER — CARVEDILOL 12.5 MG/1
12.5 TABLET ORAL 2 TIMES DAILY WITH MEALS
Status: DISCONTINUED | OUTPATIENT
Start: 2023-02-06 | End: 2023-02-10 | Stop reason: HOSPADM

## 2023-02-05 RX ADMIN — CHOLECALCIFEROL TAB 125 MCG (5000 UNIT) 5000 UNITS: 125 TAB at 08:02

## 2023-02-05 RX ADMIN — FUROSEMIDE 40 MG: 20 TABLET ORAL at 08:02

## 2023-02-05 RX ADMIN — SODIUM BICARBONATE 1300 MG: 650 TABLET ORAL at 08:02

## 2023-02-05 RX ADMIN — POLYETHYLENE GLYCOL 3350 17 G: 17 POWDER, FOR SOLUTION ORAL at 03:02

## 2023-02-05 RX ADMIN — SENNOSIDES AND DOCUSATE SODIUM 1 TABLET: 50; 8.6 TABLET ORAL at 08:02

## 2023-02-05 RX ADMIN — CARVEDILOL 6.25 MG: 6.25 TABLET, FILM COATED ORAL at 08:02

## 2023-02-05 RX ADMIN — MONTELUKAST SODIUM 5 MG: 5 TABLET, CHEWABLE ORAL at 08:02

## 2023-02-05 RX ADMIN — SODIUM BICARBONATE 1300 MG: 650 TABLET ORAL at 03:02

## 2023-02-05 RX ADMIN — OXYCODONE HYDROCHLORIDE 5 MG: 5 TABLET ORAL at 08:02

## 2023-02-05 RX ADMIN — SEVELAMER CARBONATE 800 MG: 800 TABLET, FILM COATED ORAL at 08:02

## 2023-02-05 RX ADMIN — SEVELAMER CARBONATE 800 MG: 800 TABLET, FILM COATED ORAL at 12:02

## 2023-02-05 RX ADMIN — GUAIFENESIN 600 MG: 600 TABLET, EXTENDED RELEASE ORAL at 08:02

## 2023-02-05 RX ADMIN — CARVEDILOL 6.25 MG: 6.25 TABLET, FILM COATED ORAL at 05:02

## 2023-02-05 RX ADMIN — POLYETHYLENE GLYCOL 3350 17 G: 17 POWDER, FOR SOLUTION ORAL at 08:02

## 2023-02-05 RX ADMIN — LEVOTHYROXINE SODIUM 200 MCG: 100 TABLET ORAL at 06:02

## 2023-02-05 RX ADMIN — SENNOSIDES AND DOCUSATE SODIUM 1 TABLET: 50; 8.6 TABLET ORAL at 03:02

## 2023-02-05 RX ADMIN — NIFEDIPINE 30 MG: 30 TABLET, FILM COATED, EXTENDED RELEASE ORAL at 08:02

## 2023-02-05 RX ADMIN — ESCITALOPRAM OXALATE 10 MG: 10 TABLET ORAL at 08:02

## 2023-02-05 RX ADMIN — SEVELAMER CARBONATE 800 MG: 800 TABLET, FILM COATED ORAL at 05:02

## 2023-02-05 RX ADMIN — OXYCODONE HYDROCHLORIDE 5 MG: 5 TABLET ORAL at 09:02

## 2023-02-05 NOTE — ASSESSMENT & PLAN NOTE
Chest X-ray clear. Checked chest CT. No clear etiology found: just increased fluid and atelectasis.   Checked ventilation-perfusion scan to rule out pulmonary embolism; low prob.  Continue Lasix. Wean O2.

## 2023-02-05 NOTE — ASSESSMENT & PLAN NOTE
She takes amlodipine and carvedilol.   Changing amlodipine to nifedipine; adding Coreg at lower dose, monitoring HR.

## 2023-02-05 NOTE — PLAN OF CARE
Problem: Adult Inpatient Plan of Care  Goal: Plan of Care Review  2/5/2023 0459 by Myrna Marrero RN  Outcome: Ongoing, Progressing     Problem: Adult Inpatient Plan of Care  Goal: Patient-Specific Goal (Individualized)  2/5/2023 0459 by Myrna Marrero RN  Outcome: Ongoing, Progressing     Problem: Fall Injury Risk  Goal: Absence of Fall and Fall-Related Injury  2/5/2023 0459 by Myrna Marrero RN  Outcome: Ongoing, Progressing     Problem: Gas Exchange Impaired  Goal: Optimal Gas Exchange  Outcome: Ongoing, Progressing

## 2023-02-05 NOTE — PROGRESS NOTES
[facility] ([address] Medicine  Telemedicine Progress Note    Patient Name: Kathie Quezada  MRN: [id number]  Patient Class: IP- Inpatient   Admission Date: 2/1/2023  Length of Stay: 3 days  Attending Physician: Arlene Paul MD  Primary Care Provider: Elan Price MD      Subjective:     Principal Problem:Acute hypoxemic respiratory failure    HPI:  Kathie Quezada is a 62 year old Black woman with obesity, hypertension, diabetes mellitus type 2 with retinopathy and polyneuropathy, chronic diastolic heart failure, chronic kidney disease stage 5, anemia, history of stroke, history of thyroidectomy in 2016 with postoperative hypothyroidism, 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 She lives in [facility] by herself. She is . Her primary care physician is Dr. Elan Price. Her nephrologist is Dr. Fred Kelly.    She had a left arm AV fistula creation on 10/27/2022. She had balloon angioplasty of the fistula on 12/16/2022. She had transposition of the fistula on 1/31/2023. After the procedure, she felt short of breath and developed fever (measured as 101° Fahrenheit at home the next morning). Shortness of breath was not worsened or relieved by positional changes. She contacted the vascular surgeon, who advised her to go to the emergency department. She went to [facility]. She reported lower extremity edema, memory changes, confusion, poor appetite, and nausea that had been going on for a long time prior to the procedure. She was found to have hypoxia with oxygen saturation of 88%. Temperature was 101° Fahrenheit. WBC was elevated at  [address]/uL. Chest X-ray showed clear lungs. Influenza, RSV, and COVID-19 were negative. She was admitted to [facility].      Overview/Hospital Course:  No notes on file    Interval History: Virtual follow up visit for suspected Shortness of breath [R06.02]  Hypoxia [R09.02]  Fever [R50.9] present on admission.   This service was provided by telemedicine.    Patient was transferred to [facility] on: 2/4/2023  [facility]   Admitted 2/1/2023 12:25 PM    The patient is able to provide adequate history. Additional history was obtained from: chart review.  No significant events overnight reported.  Patient reports complaints of weakness  Symptoms are stable since yesterday.     I have reviewed the following on 2/4/2023:     Details     [x]   Lab results  H&H stable    []   Micro reports     []   Pathology reports     []   Imaging reports     []   Cardiology Procedure reports     []   Outside records/CareEverywhere     []  Independently viewed:       Review of Systems   Constitutional:  Positive for fatigue. Negative for fever.     Objective:     Vital Signs (Most Recent):  Temp: 97.8 °F (36.6 °C) (02/04/23 1207)  Pulse: 76 (02/04/23 1207)  Resp: 18 (02/04/23 1207)  BP: (!) 161/70 (02/04/23 1207)  SpO2: 100 % (02/04/23 1207)   Vital Signs (24h Range):  Temp:  [97.8 °F (36.6 °C)-99.5 °F (37.5 °C)] 97.8 °F (36.6 °C)  Pulse:  [76-80] 76  Resp:  [18-20] 18  SpO2:  [93 %-100 %] 100 %  BP: (126-180)/(60-81) 161/70     Weight: 104.3 kg (230 lb)  Body mass index is 36.02 kg/m².    Intake/Output Summary (Last 24 hours) at 2/4/2023 1216  Last data filed at 2/4/2023 0611  Gross per 24 hour   Intake 478 ml   Output --   Net 478 ml      Physical Exam  Constitutional:       Appearance: She is obese.   Cardiovascular:      Comments: Monitor and/or Vital signs reviewed at time of visit  Pulmonary:      Effort: Pulmonary effort is normal. No accessory muscle usage or respiratory  distress.   Musculoskeletal:      Right lower leg: Edema present.      Left lower leg: Edema present.   Neurological:      Mental Status: She is alert. She is not disoriented.   Psychiatric:         Attention and Perception: Attention normal.         Mood and Affect: Affect normal.         Behavior: Behavior is cooperative.         Cognition and Memory: Memory is impaired.       Significant Labs:   Recent Labs   Lab 09/07/22  1047 11/17/22  0813 02/02/23  2130   HGBA1C 6.8* 5.5 5.2     Recent Labs   Lab 02/03/23  0748 02/03/23  0835 02/03/23  2100   POCTGLUCOSE 69* 78 89     Recent Labs   Lab 02/01/23  1302 02/01/23  1302 02/03/23  0424 02/04/23  0556   WBC 13.64*  --  11.04 11.90   HGB 8.9*  --  8.6* 8.6*   HCT 30.6* 29* 27.8* 28.3*     --  136* 117*     Recent Labs   Lab 02/01/23  1302 02/03/23  0424 02/04/23  0556   GRAN 72.4  9.9* 67.6  7.5 74.5*  8.9*   LYMPH 16.9*  2.3 21.7  2.4 14.4*  1.7   MONO 8.7  1.2* 8.6  1.0 8.7  1.0   EOS 0.1 0.1 0.1     Recent Labs   Lab 02/01/23  1302 02/03/23  0424 02/04/23  0556    134* 133*   K 5.6* 4.3 4.4    105 103   CO2 18* 16* 16*   BUN 33* 41* 47*   CREATININE 4.4* 4.6* 4.2*   GLU 85 64* 81   CALCIUM 7.7* 7.9* 8.2*   ALBUMIN 3.4*  --  3.1*   MG  --   --  1.8   PHOS  --   --  5.5*     Recent Labs   Lab 02/01/23  1302   ALKPHOS 69   ALT 5*   AST 22   PROT 7.4   BILITOT 0.3     Recent Labs   Lab 02/01/23  1302   TROPONINI 0.010     Recent Labs   Lab 12/19/22  1031 01/27/23  1004   FERRITIN 108 61     Results for orders placed or performed in visit on 01/27/23   Vitamin D   Result Value Ref Range    Vit D, 25-Hydroxy 43 30 - 96 ng/mL     SARS-CoV2 (COVID-19) Qualitative PCR (no units)   Date Value   02/04/2023 Not Detected   02/01/2023 Not Detected     SARS-CoV-2 RNA, Amplification, Qual (no units)   Date Value   10/23/2020 Negative     POC Rapid COVID (no units)   Date Value   07/01/2021 Positive (A)       ECG Results              EKG 12-lead (Final  result)  Result time 02/01/23 15:13:56      Final result by Interface, Lab In [facility] (02/01/23 15:13:56)                   Narrative:    Test Reason : R06.02,    Vent. Rate : 076 BPM     Atrial Rate : 076 BPM     P-R Int : 196 ms          QRS Dur : 092 ms      QT Int : 394 ms       P-R-T Axes : 051 044 042 degrees     QTc Int : 443 ms    Normal sinus rhythm  Low anterior forces  Abnormal ECG  When compared with ECG of 31-JAN-2023 13:45,  No significant change was found  Confirmed by Mark TREJO MD ([id number]) on 2/1/2023 3:13:50 PM    Referred By: [email or web address]   SELF           Confirmed By:Mark TREJO MD                                    Results for orders placed during the hospital encounter of 09/11/22    Echo    Interpretation Summary  · Severe left atrial enlargement.  · The left ventricle is normal in size with mild eccentric hypertrophy and normal systolic function.  · The estimated ejection fraction is 65%.  · Indeterminate left ventricular diastolic function.  · Normal right ventricular size with normal right ventricular systolic function.  · Mild mitral regurgitation.  · Normal central venous pressure (3 mmHg).      NM Lung Scan Ventilation Perfusion  Narrative: EXAMINATION:  NM LUNG VENTILATION AND PERFUSION IMAGING    CLINICAL HISTORY:  Pulmonary embolism (PE) suspected, high prob;    TECHNIQUE:  40.2 mCi of Tc-99m-DTPA were placed in the nebulizer. Following the inhalation Tc-99m-DTPA in aerosol and the subsequent IV administration of 4.7 mCi of Tc-99m-MAA, multiple images of the thorax were obtained in various projections.    COMPARISON:  CT chest 02/01/2023    FINDINGS:  Perfusion: No unmatched segmental or subsegmental defects.  Overall perfusion is better than ventilation.    Ventilation: Prominent tracer deposition in the trachea and proximal airways, likely poor inspiratory effort versus reactive airways disease.  Additionally there is uptake in the esophagus and stomach related to tracer  ingestion.  Impression: This represents a low probability of pulmonary embolism.    I, Varun Price MD, attest that I reviewed and interpreted the images.    Electronically signed by resident: Torsten Bruner  Date:    02/03/2023  Time:    13:12    Electronically signed by: Varun Price  Date:    02/03/2023  Time:    13:24      Labs and Imaging within the last 24 hours listed above were reviewed.       Assessment/Plan:      * Acute hypoxemic respiratory failure  Chest X-ray clear. Checked chest CT. No clear etiology found: just increased fluid and atelectasis.   Checked ventilation-perfusion scan to rule out pulmonary embolism; low prob.  Continue Lasix. Wean O2.    Postoperative fever  No clear etiology  Blood cultures collected. Monitor. Follow up cultures.    Chronic diastolic heart failure  Continue home furosemide.    Chronic kidney disease (CKD), stage V  She reports symptoms indicative of this including chronic edema, nausea, confusion.   Has AV fistula but has not yet started dialysis. Continue sodium bicarbonate, calcitriol.  Consulted Nephrology:  - increase bicarb to 1300 TID, add sevelamer 800 TID  - home dose Lasix 40 PO BID  - change amlodipine to nifedipine; add Coreg  - Can hold calcitriol while inpatient    Renovascular hypertension  She takes amlodipine and carvedilol.   Changing amlodipine to nifedipine; adding Coreg at lower dose, monitoring HR.    Metabolic bone disease  Started phosphate binder    A-V fistula  Vascular Surgery evaluated it since she had a procedure (baslic vein transposition) prior to developing shortness of breath and fever.    Controlled type 2 diabetes mellitus with stable proliferative retinopathy of both eyes, unspecified whether long term insulin use  Diet controlled.    Postoperative hypothyroidism  Continue home levothyroxine.    Chronic allergic rhinitis  Continue home montelukast. She takes levocetirizine.    Generalized anxiety disorder  Continue home  escitalopram.    Factor XI deficiency  Noted.     Discharge planning   Diet: Diet diabetic [facility]; 2000 Calorie; Renal; Fluid - 1500mL  Significant LDAs:   IV Access Type: Peripheral and Dialysis Access  Urinary Catheter Indication if present: Patient Does Not Have Urinary Catheter  Other Lines/Tubes/Drains:     Goals of Care:    Previous admission:  1/31/23  Code Status: Full Code  Likely prognosis:  Fair  Advance Care Planning   Date: 02/04/2023  Patient wishes to continue curative therapies and return to previous level of function.       LINDSEY: 2/6/2023     Code Status: Full Code   Is the patient medically ready for discharge?: No    Reason for patient still in hospital (select all that apply): Patient trending condition and Consult recommendations  Discharge Plan A: Home        The amount of time spent during, and associated with, this encounter was 40 minutes; the nature of the activities performed were:  Preparing to see the patient, chart review  Obtaining and/or receiving separately obtained history   Performing medically appropriate examination and evaluation  Counseling and educating the patient/family/caregiver  Ordering medications, tests, or procedures  Referring to and communicating with other health care professionals  Documenting clinical information in the EHR  Independently interpreting results  Care coordination        Active Hospital Problems    Diagnosis  POA    *Acute hypoxemic respiratory failure [J96.01]  Yes     Priority: 1 - High    Postoperative fever [R50.82]  Yes     Priority: 2     Chronic diastolic heart failure [I50.32]  Yes     Priority: 2      Chronic     2-8-19  · Mild left atrial enlargement.  · Mild left ventricular enlargement.  · Normal left ventricular systolic function. The estimated ejection fraction is 55%  · Indeterminate left ventricular diastolic function.  · Normal right ventricular systolic function.  · Mild mitral regurgitation.  · Mild to moderate  tricuspid regurgitation.  · The estimated PA systolic pressure is 32 mm Hg  · Normal central venous pressure (3 mm Hg).  · Trivial peircumferential pericardial effusion.      Chronic kidney disease (CKD), stage V [N18.5]  Yes     Priority: 3      Chronic    Renovascular hypertension [I15.0]  Yes     Priority: 3      Chronic    Metabolic bone disease [M89.8X9]  Yes     Priority: 4     A-V fistula [I77.0]  Yes     Priority: 5     Type 2 diabetes mellitus with stage 5 chronic kidney disease not on chronic dialysis, without long-term current use of insulin [E11.22, N18.5]  Yes     Priority: 6      Chronic    Controlled type 2 diabetes mellitus with stable proliferative retinopathy of both eyes, unspecified whether long term insulin use [E11.3553]  Yes     Priority: 6      Chronic    Diabetic polyneuropathy associated with type 2 diabetes mellitus [E11.42]  Yes     Priority: 6      Chronic    Postoperative hypothyroidism [E89.0]  Yes     Priority: 8      Chronic    Chronic allergic rhinitis [J30.9]  Yes     Priority: 18      Chronic    Generalized anxiety disorder [F41.1]  Yes     Priority: 22      Chronic    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]  Yes     Priority: 23      Chronic     Require FFP for surgeries.    7-31-19  Factor XI Activity 55 - 145 % 96            Peanut allergy [Z91.010]  Yes     Priority: 24     Discharge planning  [Z02.9]  Not Applicable     Priority: 25 - Low      Resolved Hospital Problems   No resolved problems to display.       Inpatient Medications Prescribed for Management of Current Problems:     Scheduled Meds:    carvediloL  6.25 mg Oral BID WM    cholecalciferol (vitamin D3)  5,000 Units Oral Daily    EScitalopram oxalate  10 mg Oral Daily    furosemide  40 mg Oral BID    guaiFENesin  600 mg Oral BID    levothyroxine  200 mcg Oral Before breakfast    montelukast  5 mg Oral QHS    NIFEdipine  30 mg Oral Nightly    sevelamer carbonate  800 mg Oral TID WM    sodium bicarbonate   1,300 mg Oral TID     Continuous Infusions:   As Needed: acetaminophen, hydrALAZINE, oxyCODONE, traZODone    VTE Risk Mitigation (From admission, onward)         Ordered     Place sequential compression device  Until discontinued         02/02/23 2012              I have completed this tele-visit without the assistance of a telepresenter.    The attending portion of this evaluation, treatment, and documentation was performed per Arlene Paul MD via Telemedicine AudioVisual using the secure [employer] software platform with 2 way audio/video. The provider was located off-site and the patient is located in the hospital. The aforementioned video software was utilized to document the relevant history and physical exam. Secure [employer] software platform was used instead of [employer] for this visit.      Arlene Paul MD  Department of Hospital Medicine   [facility] - Intensive Care ([place])

## 2023-02-05 NOTE — SUBJECTIVE & OBJECTIVE
Interval History: [facility] follow up visit for suspected Shortness of breath [R06.02]  Hypoxia [R09.02]  Fever [R50.9] present on admission.   This service was provided by telemedicine.    Patient was transferred to [facility] on: 2/4/2023  The patient location is: [id number]   Admitted 2/1/2023 12:25 PM    The patient is able to provide adequate history. Additional history was obtained from: chart review.  No significant events overnight reported.  Patient reports complaints of weakness. Continues to require 2 - 3 LPM O2  Symptoms are stable since yesterday.     I have reviewed the following on 2/5/2023:     Details     [x]   Lab results  H&H stable    []   Micro reports     []   Pathology reports     []   Imaging reports     []   Cardiology Procedure reports     []   Outside records/CareEverywhere     []  Independently viewed:       Review of Systems   Constitutional:  Positive for fatigue. Negative for fever.   Gastrointestinal:  Positive for constipation.     Objective:     Vital Signs (Most Recent):  Temp: 97.9 °F (36.6 °C) (02/05/23 1300)  Pulse: 72 (02/05/23 1300)  Resp: 18 (02/05/23 1300)  BP: (!) 140/70 (02/05/23 1300)  SpO2: 96 % (02/05/23 1300)   Vital Signs (24h Range):  Temp:  [97.9 °F (36.6 °C)-99.5 °F (37.5 °C)] 97.9 °F (36.6 °C)  Pulse:  [72-80] 72  Resp:  [18-20] 18  SpO2:  [89 %-100 %] 96 %  BP: (137-150)/(63-70) 140/70     Weight: 104.3 kg (230 lb)  Body mass index is 36.02 kg/m².    Intake/Output Summary (Last 24 hours) at 2/5/2023 1322  Last data filed at 2/5/2023 0458  Gross per 24 hour   Intake 120 ml   Output --   Net 120 ml        Physical Exam  Constitutional:       Appearance: She is obese.   Cardiovascular:      Comments: Monitor and/or Vital signs reviewed at time of visit  Pulmonary:      Effort: Pulmonary effort is normal. No accessory muscle usage or respiratory distress.   Musculoskeletal:      Right lower leg: Edema present.      Left lower leg: Edema present.    Neurological:      Mental Status: She is alert. She is not disoriented.   Psychiatric:         Attention and Perception: Attention normal.         Mood and Affect: Affect normal.         Behavior: Behavior is cooperative.         Cognition and Memory: Memory is impaired.       Significant Labs:   Recent Labs   Lab 09/07/22  1047 11/17/22  0813 02/02/23  2130   HGBA1C 6.8* 5.5 5.2       Recent Labs   Lab 02/03/23  2100   POCTGLUCOSE 89       Recent Labs   Lab 02/01/23  1302 02/01/23  1302 02/03/23  0424 02/04/23  0556   WBC 13.64*  --  11.04 11.90   HGB 8.9*  --  8.6* 8.6*   HCT 30.6* 29* 27.8* 28.3*     --  136* 117*       Recent Labs   Lab 02/01/23  1302 02/03/23  0424 02/04/23  0556   GRAN 72.4  9.9* 67.6  7.5 74.5*  8.9*   LYMPH 16.9*  2.3 21.7  2.4 14.4*  1.7   MONO 8.7  1.2* 8.6  1.0 8.7  1.0   EOS 0.1 0.1 0.1       Recent Labs   Lab 02/01/23  1302 02/03/23  0424 02/04/23  0556 02/05/23  1001    134* 133* 135*   K 5.6* 4.3 4.4 4.1    105 103 101   CO2 18* 16* 16* 21*   BUN 33* 41* 47* 50*   CREATININE 4.4* 4.6* 4.2* 4.3*   GLU 85 64* 81 149*   CALCIUM 7.7* 7.9* 8.2* 8.2*   ALBUMIN 3.4*  --  3.1* 3.0*   MG  --   --  1.8  --    PHOS  --   --  5.5* 5.2*       Recent Labs   Lab 02/01/23  1302   ALKPHOS 69   ALT 5*   AST 22   PROT 7.4   BILITOT 0.3       Recent Labs   Lab 02/01/23  1302   TROPONINI 0.010       Recent Labs   Lab 12/19/22  1031 01/27/23  1004   FERRITIN 108 61       Results for orders placed or performed in visit on 01/27/23   Vitamin D   Result Value Ref Range    Vit D, 25-Hydroxy 43 30 - 96 ng/mL     SARS-CoV2 (COVID-19) Qualitative PCR (no units)   Date Value   02/04/2023 Not Detected   02/01/2023 Not Detected     SARS-CoV-2 RNA, Amplification, Qual (no units)   Date Value   10/23/2020 Negative     POC Rapid COVID (no units)   Date Value   07/01/2021 Positive (A)       ECG Results              EKG 12-lead (Final result)  Result time 02/01/23 15:13:56      Final result  by Interface, Lab In [facility] (02/01/23 15:13:56)                   Narrative:    Test Reason : R06.02,    Vent. Rate : 076 BPM     Atrial Rate : 076 BPM     P-R Int : 196 ms          QRS Dur : 092 ms      QT Int : 394 ms       P-R-T Axes : 051 044 042 degrees     QTc Int : 443 ms    Normal sinus rhythm  Low anterior forces  Abnormal ECG  When compared with ECG of 31-JAN-2023 13:45,  No significant change was found  Confirmed by Mark TREJO MD ([id number]) on 2/1/2023 3:13:50 PM    Referred By: [email or web address]   SELF           Confirmed By:Mark TREJO MD                                    Results for orders placed during the hospital encounter of 09/11/22    Echo    Interpretation Summary  · Severe left atrial enlargement.  · The left ventricle is normal in size with mild eccentric hypertrophy and normal systolic function.  · The estimated ejection fraction is 65%.  · Indeterminate left ventricular diastolic function.  · Normal right ventricular size with normal right ventricular systolic function.  · Mild mitral regurgitation.  · Normal central venous pressure (3 mmHg).      NM Lung Scan Ventilation Perfusion  Narrative: EXAMINATION:  NM LUNG VENTILATION AND PERFUSION IMAGING    CLINICAL HISTORY:  Pulmonary embolism (PE) suspected, high prob;    TECHNIQUE:  40.2 mCi of Tc-99m-DTPA were placed in the nebulizer. Following the inhalation Tc-99m-DTPA in aerosol and the subsequent IV administration of 4.7 mCi of Tc-99m-MAA, multiple images of the thorax were obtained in various projections.    COMPARISON:  CT chest 02/01/2023    FINDINGS:  Perfusion: No unmatched segmental or subsegmental defects.  Overall perfusion is better than ventilation.    Ventilation: Prominent tracer deposition in the trachea and proximal airways, likely poor inspiratory effort versus reactive airways disease.  Additionally there is uptake in the esophagus and stomach related to tracer ingestion.  Impression: This represents a low probability of pulmonary  embolism.    I, Varun Price MD, attest that I reviewed and interpreted the images.    Electronically signed by resident: Torsten Bruner  Date:    02/03/2023  Time:    13:12    Electronically signed by: Varun Price  Date:    02/03/2023  Time:    13:24      Labs and Imaging within the last 24 hours listed above were reviewed.

## 2023-02-05 NOTE — ASSESSMENT & PLAN NOTE
Vascular Surgery evaluated it since she had a procedure (baslic vein transposition) prior to developing shortness of breath and fever.

## 2023-02-05 NOTE — PLAN OF CARE
Problem: Adult Inpatient Plan of Care  Goal: Plan of Care Review  Outcome: Ongoing, Progressing  Goal: Patient-Specific Goal (Individualized)  Outcome: Ongoing, Progressing  Goal: Absence of Hospital-Acquired Illness or Injury  Outcome: Ongoing, Progressing  Goal: Optimal Comfort and Wellbeing  Outcome: Ongoing, Progressing  Goal: Readiness for Transition of Care  Outcome: Ongoing, Progressing     Problem: Diabetes Comorbidity  Goal: Blood Glucose Level Within Targeted Range  Outcome: Ongoing, Progressing     Problem: Fall Injury Risk  Goal: Absence of Fall and Fall-Related Injury  Outcome: Ongoing, Progressing     Patient alert and oriented. Medicated as ordered. Continues on O2. Patient sats wnl. Assisted to bathroom as requested. Patient resting comfortably at this time

## 2023-02-05 NOTE — ASSESSMENT & PLAN NOTE
She reports symptoms indicative of this including chronic edema, nausea, confusion.   Has AV fistula but has not yet started dialysis. Continue sodium bicarbonate, calcitriol.  Consulted Nephrology:  - increase bicarb to 1300 TID, add sevelamer 800 TID  - home dose Lasix 40 PO BID  - change amlodipine to nifedipine; add Coreg  - Can hold calcitriol while inpatient

## 2023-02-05 NOTE — ASSESSMENT & PLAN NOTE
Diet: Diet diabetic [facility]; 2000 Calorie; Renal; Fluid - 1500mL  Significant LDAs:   IV Access Type: Peripheral and Dialysis Access  Urinary Catheter Indication if present: Patient Does Not Have Urinary Catheter  Other Lines/Tubes/Drains:     Goals of Care:    Previous admission:  1/31/23  Code Status: Full Code  Likely prognosis:  Fair  Advance Care Planning   Date: 02/04/2023  Patient wishes to continue curative therapies and return to previous level of function.        LINDSEY: 2/6/2023     Code Status: Full Code   Is the patient medically ready for discharge?: No    Reason for patient still in hospital (select all that apply): Patient trending condition and Consult recommendations  Discharge Plan A: Home        The amount of time spent during, and associated with, this encounter was 40 minutes; the nature of the activities performed were:  Preparing to see the patient, chart review  Obtaining and/or receiving separately obtained history   Performing medically appropriate examination and evaluation  Counseling and educating the patient/family/caregiver  Ordering medications, tests, or procedures  Referring to and communicating with other health care professionals  Documenting clinical information in the EHR  Independently interpreting results  Care coordination

## 2023-02-06 PROBLEM — I27.20 PULMONARY HYPERTENSION: Status: ACTIVE | Noted: 2023-02-06

## 2023-02-06 LAB
ALBUMIN SERPL BCP-MCNC: 3 G/DL (ref 3.5–5.2)
ANION GAP SERPL CALC-SCNC: 11 MMOL/L (ref 8–16)
ASCENDING AORTA: 3.11 CM
AV INDEX (PROSTH): 0.67
AV MEAN GRADIENT: 5 MMHG
AV PEAK GRADIENT: 9 MMHG
AV VALVE AREA: 2.16 CM2
AV VELOCITY RATIO: 0.7
BACTERIA BLD CULT: NORMAL
BACTERIA BLD CULT: NORMAL
BASOPHILS # BLD AUTO: 0.05 K/UL (ref 0–0.2)
BASOPHILS NFR BLD: 0.5 % (ref 0–1.9)
BSA FOR ECHO PROCEDURE: 2.22 M2
BUN SERPL-MCNC: 52 MG/DL (ref 6–20)
CALCIUM SERPL-MCNC: 8.4 MG/DL (ref 8.7–10.5)
CHLORIDE SERPL-SCNC: 101 MMOL/L (ref 95–110)
CO2 SERPL-SCNC: 22 MMOL/L (ref 23–29)
CREAT SERPL-MCNC: 4 MG/DL (ref 0.5–1.4)
CV ECHO LV RWT: 0.48 CM
DIFFERENTIAL METHOD: ABNORMAL
DOP CALC AO PEAK VEL: 1.48 M/S
DOP CALC AO VTI: 33.88 CM
DOP CALC LVOT AREA: 3.2 CM2
DOP CALC LVOT DIAMETER: 2.03 CM
DOP CALC LVOT PEAK VEL: 1.03 M/S
DOP CALC LVOT STROKE VOLUME: 73.11 CM3
DOP CALCLVOT PEAK VEL VTI: 22.6 CM
ECHO LV POSTERIOR WALL: 1.2 CM (ref 0.6–1.1)
EOSINOPHIL # BLD AUTO: 0.2 K/UL (ref 0–0.5)
EOSINOPHIL NFR BLD: 2.3 % (ref 0–8)
ERYTHROCYTE [DISTWIDTH] IN BLOOD BY AUTOMATED COUNT: 14.1 % (ref 11.5–14.5)
EST. GFR  (NO RACE VARIABLE): 12.2 ML/MIN/1.73 M^2
FRACTIONAL SHORTENING: 26 % (ref 28–44)
GLUCOSE SERPL-MCNC: 98 MG/DL (ref 70–110)
HCT VFR BLD AUTO: 27.6 % (ref 37–48.5)
HGB BLD-MCNC: 8.4 G/DL (ref 12–16)
IMM GRANULOCYTES # BLD AUTO: 0.04 K/UL (ref 0–0.04)
IMM GRANULOCYTES NFR BLD AUTO: 0.4 % (ref 0–0.5)
INTERVENTRICULAR SEPTUM: 1.24 CM (ref 0.6–1.1)
LA MAJOR: 6.83 CM
LA MINOR: 6.95 CM
LA WIDTH: 3.87 CM
LEFT ATRIUM SIZE: 4.33 CM
LEFT ATRIUM VOLUME INDEX: 45.6 ML/M2
LEFT ATRIUM VOLUME: 98.13 CM3
LEFT INTERNAL DIMENSION IN SYSTOLE: 3.66 CM (ref 2.1–4)
LEFT VENTRICLE DIASTOLIC VOLUME INDEX: 54.13 ML/M2
LEFT VENTRICLE DIASTOLIC VOLUME: 116.39 ML
LEFT VENTRICLE MASS INDEX: 110 G/M2
LEFT VENTRICLE SYSTOLIC VOLUME INDEX: 26.4 ML/M2
LEFT VENTRICLE SYSTOLIC VOLUME: 56.74 ML
LEFT VENTRICULAR INTERNAL DIMENSION IN DIASTOLE: 4.97 CM (ref 3.5–6)
LEFT VENTRICULAR MASS: 236.98 G
LYMPHOCYTES # BLD AUTO: 1.6 K/UL (ref 1–4.8)
LYMPHOCYTES NFR BLD: 16.8 % (ref 18–48)
MCH RBC QN AUTO: 24.5 PG (ref 27–31)
MCHC RBC AUTO-ENTMCNC: 30.4 G/DL (ref 32–36)
MCV RBC AUTO: 81 FL (ref 82–98)
MONOCYTES # BLD AUTO: 0.8 K/UL (ref 0.3–1)
MONOCYTES NFR BLD: 8.7 % (ref 4–15)
NEUTROPHILS # BLD AUTO: 6.8 K/UL (ref 1.8–7.7)
NEUTROPHILS NFR BLD: 71.3 % (ref 38–73)
NRBC BLD-RTO: 0 /100 WBC
PHOSPHATE SERPL-MCNC: 4.4 MG/DL (ref 2.7–4.5)
PISA TR MAX VEL: 3.54 M/S
PLATELET # BLD AUTO: 151 K/UL (ref 150–450)
PMV BLD AUTO: 11.2 FL (ref 9.2–12.9)
POCT GLUCOSE: 101 MG/DL (ref 70–110)
POTASSIUM SERPL-SCNC: 4.1 MMOL/L (ref 3.5–5.1)
RA MAJOR: 5.3 CM
RA PRESSURE: 8 MMHG
RA WIDTH: 3.18 CM
RBC # BLD AUTO: 3.43 M/UL (ref 4–5.4)
RIGHT VENTRICULAR END-DIASTOLIC DIMENSION: 3.11 CM
SINUS: 3.11 CM
SODIUM SERPL-SCNC: 134 MMOL/L (ref 136–145)
STJ: 3.01 CM
TDI LATERAL: 0.08 M/S
TDI SEPTAL: 0.08 M/S
TDI: 0.08 M/S
TR MAX PG: 50 MMHG
TRICUSPID ANNULAR PLANE SYSTOLIC EXCURSION: 2.29 CM
TV REST PULMONARY ARTERY PRESSURE: 58 MMHG
VON WILLEBRAND EVAL PPP-IMP: NORMAL
VWF MULTIMERS PPP QL: NORMAL
WBC # BLD AUTO: 9.57 K/UL (ref 3.9–12.7)

## 2023-02-06 PROCEDURE — 63600175 PHARM REV CODE 636 W HCPCS: Mod: HCNC,NTX | Performed by: INTERNAL MEDICINE

## 2023-02-06 PROCEDURE — 99232 SBSQ HOSP IP/OBS MODERATE 35: CPT | Mod: HCNC,NTX,, | Performed by: INTERNAL MEDICINE

## 2023-02-06 PROCEDURE — 12000002 HC ACUTE/MED SURGE SEMI-PRIVATE ROOM: Mod: HCNC,NTX

## 2023-02-06 PROCEDURE — 99232 PR SUBSEQUENT HOSPITAL CARE,LEVL II: ICD-10-PCS | Mod: HCNC,NTX,, | Performed by: INTERNAL MEDICINE

## 2023-02-06 PROCEDURE — 51798 US URINE CAPACITY MEASURE: CPT | Mod: HCNC,NTX

## 2023-02-06 PROCEDURE — 25000003 PHARM REV CODE 250: Mod: HCNC,NTX | Performed by: INTERNAL MEDICINE

## 2023-02-06 PROCEDURE — 80069 RENAL FUNCTION PANEL: CPT | Mod: HCNC,NTX | Performed by: INTERNAL MEDICINE

## 2023-02-06 PROCEDURE — 36415 COLL VENOUS BLD VENIPUNCTURE: CPT | Mod: HCNC,NTX | Performed by: INTERNAL MEDICINE

## 2023-02-06 PROCEDURE — 99233 SBSQ HOSP IP/OBS HIGH 50: CPT | Mod: HCNC,95,NTX, | Performed by: INTERNAL MEDICINE

## 2023-02-06 PROCEDURE — 99900035 HC TECH TIME PER 15 MIN (STAT): Mod: HCNC,NTX

## 2023-02-06 PROCEDURE — 99233 PR SUBSEQUENT HOSPITAL CARE,LEVL III: ICD-10-PCS | Mod: HCNC,95,NTX, | Performed by: INTERNAL MEDICINE

## 2023-02-06 PROCEDURE — 94799 UNLISTED PULMONARY SVC/PX: CPT | Mod: HCNC,NTX

## 2023-02-06 PROCEDURE — 25000003 PHARM REV CODE 250: Mod: HCNC,NTX | Performed by: HOSPITALIST

## 2023-02-06 PROCEDURE — 94761 N-INVAS EAR/PLS OXIMETRY MLT: CPT | Mod: HCNC,NTX

## 2023-02-06 PROCEDURE — 85025 COMPLETE CBC W/AUTO DIFF WBC: CPT | Mod: HCNC,NTX | Performed by: INTERNAL MEDICINE

## 2023-02-06 PROCEDURE — 27000221 HC OXYGEN, UP TO 24 HOURS: Mod: HCNC,NTX

## 2023-02-06 RX ORDER — FUROSEMIDE 10 MG/ML
80 INJECTION INTRAMUSCULAR; INTRAVENOUS 2 TIMES DAILY
Status: DISCONTINUED | OUTPATIENT
Start: 2023-02-06 | End: 2023-02-08

## 2023-02-06 RX ORDER — FUROSEMIDE 10 MG/ML
40 INJECTION INTRAMUSCULAR; INTRAVENOUS ONCE
Status: COMPLETED | OUTPATIENT
Start: 2023-02-06 | End: 2023-02-06

## 2023-02-06 RX ORDER — NIFEDIPINE 30 MG/1
60 TABLET, EXTENDED RELEASE ORAL NIGHTLY
Status: DISCONTINUED | OUTPATIENT
Start: 2023-02-06 | End: 2023-02-10 | Stop reason: HOSPADM

## 2023-02-06 RX ADMIN — CARVEDILOL 12.5 MG: 12.5 TABLET, FILM COATED ORAL at 09:02

## 2023-02-06 RX ADMIN — MONTELUKAST SODIUM 5 MG: 5 TABLET, CHEWABLE ORAL at 09:02

## 2023-02-06 RX ADMIN — FUROSEMIDE 80 MG: 10 INJECTION, SOLUTION INTRAMUSCULAR; INTRAVENOUS at 04:02

## 2023-02-06 RX ADMIN — GUAIFENESIN 600 MG: 600 TABLET, EXTENDED RELEASE ORAL at 09:02

## 2023-02-06 RX ADMIN — SENNOSIDES AND DOCUSATE SODIUM 1 TABLET: 50; 8.6 TABLET ORAL at 09:02

## 2023-02-06 RX ADMIN — SEVELAMER CARBONATE 800 MG: 800 TABLET, FILM COATED ORAL at 09:02

## 2023-02-06 RX ADMIN — CHOLECALCIFEROL TAB 125 MCG (5000 UNIT) 5000 UNITS: 125 TAB at 09:02

## 2023-02-06 RX ADMIN — FUROSEMIDE 40 MG: 20 TABLET ORAL at 09:02

## 2023-02-06 RX ADMIN — POLYETHYLENE GLYCOL 3350 17 G: 17 POWDER, FOR SOLUTION ORAL at 09:02

## 2023-02-06 RX ADMIN — CARVEDILOL 12.5 MG: 12.5 TABLET, FILM COATED ORAL at 04:02

## 2023-02-06 RX ADMIN — OXYCODONE HYDROCHLORIDE 5 MG: 5 TABLET ORAL at 04:02

## 2023-02-06 RX ADMIN — SODIUM BICARBONATE 1300 MG: 650 TABLET ORAL at 09:02

## 2023-02-06 RX ADMIN — LEVOTHYROXINE SODIUM 200 MCG: 100 TABLET ORAL at 05:02

## 2023-02-06 RX ADMIN — ESCITALOPRAM OXALATE 10 MG: 10 TABLET ORAL at 09:02

## 2023-02-06 RX ADMIN — SEVELAMER CARBONATE 800 MG: 800 TABLET, FILM COATED ORAL at 12:02

## 2023-02-06 RX ADMIN — FUROSEMIDE 40 MG: 10 INJECTION, SOLUTION INTRAMUSCULAR; INTRAVENOUS at 11:02

## 2023-02-06 RX ADMIN — NIFEDIPINE 60 MG: 30 TABLET, FILM COATED, EXTENDED RELEASE ORAL at 09:02

## 2023-02-06 RX ADMIN — SODIUM BICARBONATE 1300 MG: 650 TABLET ORAL at 02:02

## 2023-02-06 RX ADMIN — SEVELAMER CARBONATE 800 MG: 800 TABLET, FILM COATED ORAL at 04:02

## 2023-02-06 NOTE — PLAN OF CARE
Problem: Adult Inpatient Plan of Care  Goal: Plan of Care Review  Outcome: Ongoing, Progressing  Goal: Patient-Specific Goal (Individualized)  Outcome: Ongoing, Progressing  Goal: Absence of Hospital-Acquired Illness or Injury  Outcome: Ongoing, Progressing  Goal: Optimal Comfort and Wellbeing  Outcome: Ongoing, Progressing  Goal: Readiness for Transition of Care  Outcome: Ongoing, Progressing     Problem: Diabetes Comorbidity  Goal: Blood Glucose Level Within Targeted Range  Outcome: Ongoing, Progressing     Problem: Fall Injury Risk  Goal: Absence of Fall and Fall-Related Injury  Outcome: Ongoing, Progressing     Problem: Gas Exchange Impaired  Goal: Optimal Gas Exchange  Outcome: Ongoing, Progressing     Patient alert and oriented. Cooperative with care. Medicated for pain as ordered. Patient assisted to bathroom. Resting comfortably at this time

## 2023-02-06 NOTE — SUBJECTIVE & OBJECTIVE
Interval History: Cr 4.3-->4. UOP 2x, no volume measured.     Review of patient's allergies indicates:   Allergen Reactions    Atorvastatin Other (See Comments)     Statin induced myalgia    Mushroom Hives    Peanut Anaphylaxis    Bactrim [sulfamethoxazole-trimethoprim] Hives    Gabapentin      Pruritis     Iodine     Peanut butter flavor     Penicillins Hives    Shellfish containing products Itching    Sulfa (sulfonamide antibiotics) Hives     Current Facility-Administered Medications   Medication Frequency    acetaminophen tablet 650 mg Q6H PRN    carvediloL tablet 12.5 mg BID WM    cholecalciferol (vitamin D3) 125 mcg (5,000 unit) tablet 5,000 Units Daily    EScitalopram oxalate tablet 10 mg Daily    furosemide injection 80 mg BID loop    guaiFENesin 12 hr tablet 600 mg BID    hydrALAZINE injection 5 mg Q8H PRN    levothyroxine tablet 200 mcg Before breakfast    montelukast chewable tablet 5 mg QHS    NIFEdipine 24 hr tablet 60 mg Nightly    oxyCODONE immediate release tablet 5 mg Q4H PRN    polyethylene glycol packet 17 g BID    senna-docusate 8.6-50 mg per tablet 1 tablet BID    sevelamer carbonate tablet 800 mg TID WM    sodium bicarbonate tablet 1,300 mg TID    traZODone tablet 50 mg Nightly PRN       Objective:     Vital Signs (Most Recent):  Temp: 98 °F (36.7 °C) (02/06/23 1119)  Pulse: 79 (02/06/23 1119)  Resp: 18 (02/06/23 1119)  BP: 132/63 (02/06/23 1119)  SpO2: (!) 92 % (02/06/23 1119)   Vital Signs (24h Range):  Temp:  [98 °F (36.7 °C)-99.1 °F (37.3 °C)] 98 °F (36.7 °C)  Pulse:  [72-79] 79  Resp:  [16-20] 18  SpO2:  [69 %-99 %] 92 %  BP: (132-183)/(62-81) 132/63     Weight: 104.3 kg (230 lb) (02/06/23 1119)  Body mass index is 36.02 kg/m².  Body surface area is 2.22 meters squared.    I/O last 3 completed shifts:  In: 120 [P.O.:120]  Out: -     Physical Exam  Vitals and nursing note reviewed.   Constitutional:       Appearance: She is well-developed. She is obese. She is not diaphoretic.       Interventions: Nasal cannula in place.   HENT:      Head: Normocephalic and atraumatic.   Eyes:      General: No scleral icterus.     Conjunctiva/sclera: Conjunctivae normal.   Cardiovascular:      Rate and Rhythm: Normal rate and regular rhythm.      Heart sounds: No murmur heard.    No friction rub.   Pulmonary:      Effort: Pulmonary effort is normal. No respiratory distress.      Breath sounds: No stridor. No wheezing or rales.      Comments: On 2-3 L O2  Abdominal:      General: There is no distension.      Palpations: Abdomen is soft.      Tenderness: There is no abdominal tenderness. There is no guarding.   Musculoskeletal:         General: No signs of injury.      Comments: Left AV fistula site dressing in place   Skin:     General: Skin is warm and dry.      Coloration: Skin is not jaundiced or pale.   Neurological:      Mental Status: She is alert.      Cranial Nerves: No dysarthria.      Motor: No atrophy or seizure activity.   Psychiatric:         Attention and Perception: Attention normal.         Mood and Affect: Mood and affect normal.         Behavior: Behavior is cooperative.       Significant Labs:  CBC:   Recent Labs   Lab 02/06/23  0431   WBC 9.57   RBC 3.43*   HGB 8.4*   HCT 27.6*      MCV 81*   MCH 24.5*   MCHC 30.4*     CMP:   Recent Labs   Lab 02/01/23  1302 02/03/23  0424 02/06/23  0431   GLU 85   < > 98   CALCIUM 7.7*   < > 8.4*   ALBUMIN 3.4*   < > 3.0*   PROT 7.4  --   --       < > 134*   K 5.6*   < > 4.1   CO2 18*   < > 22*      < > 101   BUN 33*   < > 52*   CREATININE 4.4*   < > 4.0*   ALKPHOS 69  --   --    ALT 5*  --   --    AST 22  --   --    BILITOT 0.3  --   --     < > = values in this interval not displayed.     All labs within the past 24 hours have been reviewed.     Significant Imaging:  Labs: Reviewed

## 2023-02-06 NOTE — PROGRESS NOTES
[facility] ([place] [address])  Nephrology  Progress Note    Patient Name: Kathie Quezada  MRN: [id number]  Admission Date: 2/1/2023  Hospital Length of Stay: 5 days  Attending Provider: Arlene Paul MD   Primary Care Physician: Elan Price MD  Principal Problem:Acute hypoxemic respiratory failure    Subjective:     HPI:  Kathie Quezada 63 yo F with PMHX of obesity, hypertension, diabetes mellitus type 2 (A1c 5.2) with retinopathy and polyneuropathy, chronic diastolic heart failure (EF 65%), CKD V not yet initiated on HD, hx of stroke, s/p thyroidectomy in 2016 with postoperative hypothyroidism, 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 She was admitted 1 day after transposition of her AVF by vascular surgery on 1/31/23. She had AVF creation back in 10/27/2022. After the procedure, she felt SOB, developed fever of 101 and came to the ED. She reported lower extremity edema, memory changes, confusion, poor appetite, and nausea that had been going on for a long time prior to the procedure. Also noted to have leukocytosis with WBC count 13 which is now normal. She was treated for post op atelectasis which was seen on CT chest w/o contrast with incentive spirometry. VQ perfusion scan revealed low probability for PE. Blood cultures with NGTD, fever trending down, covid/flu/rsv negative.     Nephrology consulted for ARUNA on CKD5. She follows with  for nephrology, last seen in 1/27/23. Prior to that, followed with Dr. Kimball. Currently also undergoing transplant evaluation. Presumed to have CKD 2/2 to HTN and diabetes. She had received clindamycin for surgical prophylaxis, but no other abx. Baseline Cr appears to be around 3 to 3.6 with progression over the last few years to CKD V. Her creatinine trend has been  3.6-->4.4-->4.6--4.2 as of 2/4/22. Previous workup in 2018 revealed negative JB, ANCA, HIV, C3/C4. She is Hep C antibody + s/p treatment with Harvoni in 2016. Hep B core/surface Ab +, surface Ag negative. Previous kappa/lambda ration was 1.61, not diagnostic of myeloma in context of CKD. She previously had nephrotic range proteinuria around 4.5 (2018) that has since decreased to 1.37 (May 2019). PTH was 342 (1/27/23), increased from 283 before, for which she was started on calcitriol by Dr. Kelly. She states that she has not yet picked it up and started it prior to hospitalization. Unclear why her lisinopril was stopped. She reports fatigue and arm pain. Denies NSAID use, dysuria, hematuria. She reports poor BP control at home with BP running in 180s/90s. CXR revealed cardiomegaly but no pleural effusion. Significant labs include BUN 47/Cr 4.2, improving, bicarb 16, K wnl, phos 5.5.           Interval History: Cr 4.3-->4. UOP 2x, no volume measured.     Review of patient's allergies indicates:   Allergen Reactions    Atorvastatin Other (See Comments)     Statin induced myalgia    Mushroom Hives    Peanut Anaphylaxis    Bactrim [sulfamethoxazole-trimethoprim] Hives    Gabapentin      Pruritis     Iodine     Peanut butter flavor     Penicillins Hives    Shellfish containing products Itching    Sulfa (sulfonamide antibiotics) Hives     Current Facility-Administered Medications   Medication Frequency    acetaminophen tablet 650 mg Q6H PRN    carvediloL tablet 12.5 mg BID WM    cholecalciferol (vitamin D3) 125 mcg (5,000 unit) tablet 5,000 Units Daily    EScitalopram oxalate tablet 10 mg Daily    furosemide injection 80 mg BID loop    guaiFENesin 12 hr tablet 600 mg BID    hydrALAZINE injection 5 mg Q8H PRN    levothyroxine tablet 200 mcg Before breakfast    montelukast chewable tablet 5 mg QHS    NIFEdipine 24 hr tablet 60 mg Nightly    oxyCODONE immediate release tablet 5 mg Q4H PRN     polyethylene glycol packet 17 g BID    senna-docusate 8.6-50 mg per tablet 1 tablet BID    sevelamer carbonate tablet 800 mg TID WM    sodium bicarbonate tablet 1,300 mg TID    traZODone tablet 50 mg Nightly PRN       Objective:     Vital Signs (Most Recent):  Temp: 98 °F (36.7 °C) (02/06/23 1119)  Pulse: 79 (02/06/23 1119)  Resp: 18 (02/06/23 1119)  BP: 132/63 (02/06/23 1119)  SpO2: (!) 92 % (02/06/23 1119)   Vital Signs (24h Range):  Temp:  [98 °F (36.7 °C)-99.1 °F (37.3 °C)] 98 °F (36.7 °C)  Pulse:  [72-79] 79  Resp:  [16-20] 18  SpO2:  [69 %-99 %] 92 %  BP: (132-183)/(62-81) 132/63     Weight: 104.3 kg (230 lb) (02/06/23 1119)  Body mass index is 36.02 kg/m².  Body surface area is 2.22 meters squared.    I/O last 3 completed shifts:  In: 120 [P.O.:120]  Out: -     Physical Exam  Vitals and nursing note reviewed.   Constitutional:       Appearance: She is well-developed. She is obese. She is not diaphoretic.      Interventions: Nasal cannula in place.   HENT:      Head: Normocephalic and atraumatic.   Eyes:      General: No scleral icterus.     Conjunctiva/sclera: Conjunctivae normal.   Cardiovascular:      Rate and Rhythm: Normal rate and regular rhythm.      Heart sounds: No murmur heard.    No friction rub.   Pulmonary:      Effort: Pulmonary effort is normal. No respiratory distress.      Breath sounds: No stridor. No wheezing or rales.      Comments: On 2-3 L O2  Abdominal:      General: There is no distension.      Palpations: Abdomen is soft.      Tenderness: There is no abdominal tenderness. There is no guarding.   Musculoskeletal:         General: No signs of injury.      Comments: Left AV fistula site dressing in place   Skin:     General: Skin is warm and dry.      Coloration: Skin is not jaundiced or pale.   Neurological:      Mental Status: She is alert.      Cranial Nerves: No dysarthria.      Motor: No atrophy or seizure activity.   Psychiatric:         Attention and Perception: Attention  normal.         Mood and Affect: Mood and affect normal.         Behavior: Behavior is cooperative.       Significant Labs:  CBC:   Recent Labs   Lab 02/06/23  0431   WBC 9.57   RBC 3.43*   HGB 8.4*   HCT 27.6*      MCV 81*   MCH 24.5*   MCHC 30.4*     CMP:   Recent Labs   Lab 02/01/23  1302 02/03/23  0424 02/06/23  0431   GLU 85   < > 98   CALCIUM 7.7*   < > 8.4*   ALBUMIN 3.4*   < > 3.0*   PROT 7.4  --   --       < > 134*   K 5.6*   < > 4.1   CO2 18*   < > 22*      < > 101   BUN 33*   < > 52*   CREATININE 4.4*   < > 4.0*   ALKPHOS 69  --   --    ALT 5*  --   --    AST 22  --   --    BILITOT 0.3  --   --     < > = values in this interval not displayed.     All labs within the past 24 hours have been reviewed.     Significant Imaging:  Labs: Reviewed    Assessment/Plan:     Chronic kidney disease (CKD), stage V  63 yo F with CKD 5 2/2 to DM and poorly controlled HTN not yet started on HD with ARUNA. Previous GN workup was negative. Unclear why she was taken off lisinopril in the past. Currently also undergoing transplant evaluation. Has AVF, s/p transposition. Would need line if starting HD. Reports some fatigue, but no emergent need for HD for electrolytes, clearance or volume status. Cr trending down from 4.6-->4.2. Creatinine 3.6 on admit, baseline around 3-3.6. Phos elevated at 5.5, bicarb 16    UPCr 1.37 (5/2019). Previously as high as 4.5.    (1/27/23)  UA: 2+ protein, 1+blood, 22 WBC  RP US: (11/17/22): medical renal disease    Lab Results   Component Value Date    CREATININE 4.2 (H) 02/04/2023       Plan:   - continue bicarb to 1300 TID and sevelamer 800 TID  - Increase lasix to 80 IV BID given increased oxygen requirements  - Significant amount of proteinuria seen previously, would recommend outpatient ACEi or ARB  - Can hold calcitriol while inpatient  - Strict I&Os and daily weights   - Daily BMP  - Trend sCr  - Avoid nephrotoxic agents such as NSAIDs, gadolinium, ACEi, ARBs and IV  radiocontrast.  - Renally dose meds to current GFR.  - Maintain MAP > 65.  - No indications for HD at this time.   - Maintain electrolytes at goal: Mg > 2, Phos > 3, K > 4.      Renovascular hypertension  bp improved, still sometimes hyptertensive.    - on coreg 12.5 BID and nifedipine 60        Thank you for your consult. I will follow-up with patient. Please contact us if you have any additional questions.    Shama Olson MD  Nephrology  [facility] ([place])

## 2023-02-06 NOTE — ASSESSMENT & PLAN NOTE
Diet: Diet diabetic [facility]; 2000 Calorie; Renal; Fluid - 1500mL  Significant LDAs:   IV Access Type: Peripheral and Dialysis Access  Urinary Catheter Indication if present: Patient Does Not Have Urinary Catheter  Other Lines/Tubes/Drains:     Goals of Care:    Previous admission:  1/31/23  Code Status: Full Code  Likely prognosis:  Fair  Advance Care Planning   Date: 02/04/2023  Patient wishes to continue curative therapies and return to previous level of function.      LINDSEY: 2/6/2023     Code Status: Full Code   Is the patient medically ready for discharge?: No    Reason for patient still in hospital (select all that apply): Patient trending condition and Consult recommendations  Discharge Plan A: Home      High Risk Conditions:  Patient has a condition that poses threat to life and bodily function: Severe Respiratory Distress    The amount of time spent during, and associated with, this encounter was 50 minutes; the nature of the activities performed were:  Preparing to see the patient, chart review  Obtaining and/or receiving separately obtained history   Performing medically appropriate examination and evaluation  Counseling and educating the patient/family/caregiver  Ordering medications, tests, or procedures  Referring to and communicating with other health care professionals  Documenting clinical information in the EHR  Independently interpreting results  Care coordination

## 2023-02-06 NOTE — ASSESSMENT & PLAN NOTE
Vascular Surgery evaluated it since she had a procedure (baslic vein transposition) prior to developing shortness of breath and fever.  Doubt infection at this time

## 2023-02-06 NOTE — ASSESSMENT & PLAN NOTE
She reports symptoms indicative of this including chronic edema, nausea, confusion.   Has AV fistula but has not yet started dialysis. Continue sodium bicarbonate, calcitriol.  Consulted Nephrology:  - increased bicarb to 1300 TID, added sevelamer 800 TID  - home dose Lasix 40 PO BID  - changed amlodipine to nifedipine; resumed Coreg  - Can hold calcitriol while inpatient

## 2023-02-06 NOTE — ASSESSMENT & PLAN NOTE
She takes amlodipine and carvedilol.   Changing amlodipine to nifedipine; added Coreg at lower dose, monitoring HR.  Increasing Coreg 2/5

## 2023-02-06 NOTE — PLAN OF CARE
Pt remains on iv lasix requiring o2, noted pt had walk test today she desatted to 70% on ra with walking, pt is not clinically ready for discharge CM will cont to monitor if pt does not dc within next 48  hours she will need repeat walk test

## 2023-02-06 NOTE — PROGRESS NOTES
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 [place] herself. She is . Her primary care physician is Dr. Elan Price. Her nephrologist is Dr. Fred Kelly.    She had a left arm AV fistula creation on 10/27/2022. She had balloon angioplasty of the fistula on 12/16/2022. She had transposition of the fistula on 1/31/2023. After the procedure, she felt short of breath and developed fever (measured as 101° Fahrenheit at home the next morning). Shortness of breath was not worsened or relieved by positional changes. She contacted the vascular surgeon, who advised her to go to the emergency department. She went to [facility]. She reported lower extremity edema, memory changes, confusion, poor appetite, and nausea that had been going on for a long time prior to the procedure. She was found to have hypoxia with oxygen saturation of 88%. Temperature was 101° Fahrenheit. [place] at [address]/uL.  Chest X-ray showed clear lungs. Influenza, RSV, and COVID-19 were negative. She was admitted to [facility] Medicine Team B.      Overview/Hospital Course:  No notes on file    Interval History: Virtual follow up visit for suspected Shortness of breath [R06.02]  Hypoxia [R09.02]  Fever [R50.9] present on admission.   This service was provided by telemedicine.    Patient was transferred to [facility] on: 2/4/2023  The patient location is: [facility]   Admitted 2/1/2023 12:25 PM    The patient is able to provide adequate history. Additional history was obtained from: chart review.  No significant events overnight reported.  Patient reports complaints of weakness. Continues to require 2 - 3 LPM O2  Symptoms are stable since yesterday.     I have reviewed the following on 2/5/2023:     Details     [x]   Lab results  H&H stable    []   Micro reports     []   Pathology reports     []   Imaging reports     []   Cardiology Procedure reports     []   Outside records/CareEverywhere     []  Independently viewed:       Review of Systems   Constitutional:  Positive for fatigue. Negative for fever.   Gastrointestinal:  Positive for constipation.     Objective:     Vital Signs (Most Recent):  Temp: 97.9 °F (36.6 °C) (02/05/23 1300)  Pulse: 72 (02/05/23 1300)  Resp: 18 (02/05/23 1300)  BP: (!) 140/70 (02/05/23 1300)  SpO2: 96 % (02/05/23 1300)   Vital Signs (24h Range):  Temp:  [97.9 °F (36.6 °C)-99.5 °F (37.5 °C)] 97.9 °F (36.6 °C)  Pulse:  [72-80] 72  Resp:  [18-20] 18  SpO2:  [89 %-100 %] 96 %  BP: (137-150)/(63-70) 140/70     Weight: 104.3 kg (230 lb)  Body mass index is 36.02 kg/m².    Intake/Output Summary (Last 24 hours) at 2/5/2023 1322  Last data filed at 2/5/2023 0458  Gross per 24 hour   Intake 120 ml   Output --   Net 120 ml        Physical Exam  Constitutional:       Appearance: She is obese.   Cardiovascular:      Comments: Monitor and/or Vital signs reviewed at time of visit  Pulmonary:      Effort:  Pulmonary effort is normal. No accessory muscle usage or respiratory distress.   Musculoskeletal:      Right lower leg: Edema present.      Left lower leg: Edema present.   Neurological:      Mental Status: She is alert. She is not disoriented.   Psychiatric:         Attention and Perception: Attention normal.         Mood and Affect: Affect normal.         Behavior: Behavior is cooperative.         Cognition and Memory: Memory is impaired.       Significant Labs:   Recent Labs   Lab 09/07/22  1047 11/17/22  0813 02/02/23  2130   HGBA1C 6.8* 5.5 5.2       Recent Labs   Lab 02/03/23  2100   POCTGLUCOSE 89       Recent Labs   Lab 02/01/23  1302 02/01/23  1302 02/03/23  0424 02/04/23  0556   WBC 13.64*  --  11.04 11.90   HGB 8.9*  --  8.6* 8.6*   HCT 30.6* 29* 27.8* 28.3*     --  136* 117*       Recent Labs   Lab 02/01/23  1302 02/03/23  0424 02/04/23  0556   GRAN 72.4  9.9* 67.6  7.5 74.5*  8.9*   LYMPH 16.9*  2.3 21.7  2.4 14.4*  1.7   MONO 8.7  1.2* 8.6  1.0 8.7  1.0   EOS 0.1 0.1 0.1       Recent Labs   Lab 02/01/23  1302 02/03/23  0424 02/04/23  0556 02/05/23  1001    134* 133* 135*   K 5.6* 4.3 4.4 4.1    105 103 101   CO2 18* 16* 16* 21*   BUN 33* 41* 47* 50*   CREATININE 4.4* 4.6* 4.2* 4.3*   GLU 85 64* 81 149*   CALCIUM 7.7* 7.9* 8.2* 8.2*   ALBUMIN 3.4*  --  3.1* 3.0*   MG  --   --  1.8  --    PHOS  --   --  5.5* 5.2*       Recent Labs   Lab 02/01/23  1302   ALKPHOS 69   ALT 5*   AST 22   PROT 7.4   BILITOT 0.3       Recent Labs   Lab 02/01/23  1302   TROPONINI 0.010       Recent Labs   Lab 12/19/22  1031 01/27/23  1004   FERRITIN 108 61       Results for orders placed or performed in visit on 01/27/23   Vitamin D   Result Value Ref Range    Vit D, 25-Hydroxy 43 30 - 96 ng/mL     SARS-CoV2 (COVID-19) Qualitative PCR (no units)   Date Value   02/04/2023 Not Detected   02/01/2023 Not Detected     SARS-CoV-2 RNA, Amplification, Qual (no units)   Date Value   10/23/2020 Negative      POC Rapid COVID (no units)   Date Value   07/01/2021 Positive (A)       ECG Results              EKG 12-lead (Final result)  Result time 02/01/23 15:13:56      Final result by Interface, Lab In [facility] (02/01/23 15:13:56)                   Narrative:    Test Reason : R06.02,    Vent. Rate : 076 BPM     Atrial Rate : 076 BPM     P-R Int : 196 ms          QRS Dur : 092 ms      QT Int : 394 ms       P-R-T Axes : 051 044 042 degrees     QTc Int : 443 ms    Normal sinus rhythm  Low anterior forces  Abnormal ECG  When compared with ECG of 31-JAN-2023 13:45,  No significant change was found  Confirmed by Mark TREJO MD ([id number]) on 2/1/2023 3:13:50 PM    Referred By: [email or web address]   SELF           Confirmed By:Mark TREJO MD                                    Results for orders placed during the hospital encounter of 09/11/22    Echo    Interpretation Summary  · Severe left atrial enlargement.  · The left ventricle is normal in size with mild eccentric hypertrophy and normal systolic function.  · The estimated ejection fraction is 65%.  · Indeterminate left ventricular diastolic function.  · Normal right ventricular size with normal right ventricular systolic function.  · Mild mitral regurgitation.  · Normal central venous pressure (3 mmHg).      NM Lung Scan Ventilation Perfusion  Narrative: EXAMINATION:  NM LUNG VENTILATION AND PERFUSION IMAGING    CLINICAL HISTORY:  Pulmonary embolism (PE) suspected, high prob;    TECHNIQUE:  40.2 mCi of Tc-99m-DTPA were placed in the nebulizer. Following the inhalation Tc-99m-DTPA in aerosol and the subsequent IV administration of 4.7 mCi of Tc-99m-MAA, multiple images of the thorax were obtained in various projections.    COMPARISON:  CT chest 02/01/2023    FINDINGS:  Perfusion: No unmatched segmental or subsegmental defects.  Overall perfusion is better than ventilation.    Ventilation: Prominent tracer deposition in the trachea and proximal airways, likely poor inspiratory effort versus  reactive airways disease.  Additionally there is uptake in the esophagus and stomach related to tracer ingestion.  Impression: This represents a low probability of pulmonary embolism.    I, Varun Price MD, attest that I reviewed and interpreted the images.    Electronically signed by resident: Torsten Bruner  Date:    02/03/2023  Time:    13:12    Electronically signed by: Varun Price  Date:    02/03/2023  Time:    13:24      Labs and Imaging within the last 24 hours listed above were reviewed.         Assessment/Plan:      * Acute hypoxemic respiratory failure  Chest X-ray clear. Checked chest CT. No clear etiology found: just increased fluid and atelectasis.   Checked ventilation-perfusion scan to rule out pulmonary embolism; low prob.  Continue Lasix. Wean O2 as tolerated.  No improvement; Echo ordered 2/5. Check CVP, suspect volume overload.    Postoperative fever  No clear etiology  Blood cultures collected. Monitor. Follow up cultures - negative.    Chronic diastolic heart failure  Continue home furosemide.    Chronic kidney disease (CKD), stage V  She reports symptoms indicative of this including chronic edema, nausea, confusion.   Has AV fistula but has not yet started dialysis. Continue sodium bicarbonate, calcitriol.  Consulted Nephrology:  - increased bicarb to 1300 TID, added sevelamer 800 TID  - home dose Lasix 40 PO BID  - changed amlodipine to nifedipine; resumed Coreg  - Can hold calcitriol while inpatient    Renovascular hypertension  She takes amlodipine and carvedilol.   Changing amlodipine to nifedipine; added Coreg at lower dose, monitoring HR.  Increasing Coreg 2/5    Metabolic bone disease  Started phosphate binder    A-V fistula  Vascular Surgery evaluated it since she had a procedure (baslic vein transposition) prior to developing shortness of breath and fever.  Doubt infection at this time    Controlled type 2 diabetes mellitus with stable proliferative retinopathy of both eyes, unspecified  whether long term insulin use  Diet controlled.    Postoperative hypothyroidism  Continue home levothyroxine.    Chronic allergic rhinitis  Continue home montelukast. She takes levocetirizine.    Generalized anxiety disorder  Continue home escitalopram.    Factor XI deficiency  Noted.     Discharge planning   Diet: Diet diabetic [facility]; 2000 Calorie; Renal; Fluid - 1500mL  Significant LDAs:   IV Access Type: Peripheral and Dialysis Access  Urinary Catheter Indication if present: Patient Does Not Have Urinary Catheter  Other Lines/Tubes/Drains:     Goals of Care:    Previous admission:  1/31/23  Code Status: Full Code  Likely prognosis:  Fair  Advance Care Planning   Date: 02/04/2023  Patient wishes to continue curative therapies and return to previous level of function.     LINDSEY: 2/6/2023     Code Status: Full Code   Is the patient medically ready for discharge?: No    Reason for patient still in hospital (select all that apply): Patient trending condition and Consult recommendations  Discharge Plan A: Home      High Risk Conditions:  Patient has a condition that poses threat to life and bodily function: Severe Respiratory Distress    The amount of time spent during, and associated with, this encounter was 50 minutes; the nature of the activities performed were:  Preparing to see the patient, chart review  Obtaining and/or receiving separately obtained history   Performing medically appropriate examination and evaluation  Counseling and educating the patient/family/caregiver  Ordering medications, tests, or procedures  Referring to and communicating with other health care professionals  Documenting clinical information in the EHR  Independently interpreting results  Care coordination        Active Hospital Problems    Diagnosis  POA    *Acute hypoxemic respiratory failure [J96.01]  Yes     Priority: 1 - High    Postoperative fever [R50.82]  Yes     Priority: 2     Chronic diastolic heart failure [I50.32]   Yes     Priority: 2      Chronic     2-8-19  · Mild left atrial enlargement.  · Mild left ventricular enlargement.  · Normal left ventricular systolic function. The estimated ejection fraction is 55%  · Indeterminate left ventricular diastolic function.  · Normal right ventricular systolic function.  · Mild mitral regurgitation.  · Mild to moderate tricuspid regurgitation.  · The estimated PA systolic pressure is 32 mm Hg  · Normal central venous pressure (3 mm Hg).  · Trivial peircumferential pericardial effusion.      Chronic kidney disease (CKD), stage V [N18.5]  Yes     Priority: 3      Chronic    Renovascular hypertension [I15.0]  Yes     Priority: 3      Chronic    Metabolic bone disease [M89.8X9]  Yes     Priority: 4     A-V fistula [I77.0]  Yes     Priority: 5     Type 2 diabetes mellitus with stage 5 chronic kidney disease not on chronic dialysis, without long-term current use of insulin [E11.22, N18.5]  Yes     Priority: 6      Chronic    Controlled type 2 diabetes mellitus with stable proliferative retinopathy of both eyes, unspecified whether long term insulin use [E11.3553]  Yes     Priority: 6      Chronic    Diabetic polyneuropathy associated with type 2 diabetes mellitus [E11.42]  Yes     Priority: 6      Chronic    Postoperative hypothyroidism [E89.0]  Yes     Priority: 8      Chronic    Chronic allergic rhinitis [J30.9]  Yes     Priority: 18      Chronic    Generalized anxiety disorder [F41.1]  Yes     Priority: 22      Chronic    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]  Yes     Priority: 23      Chronic     Require FFP for surgeries.    7-31-19  Factor XI Activity 55 - 145 % 96            Peanut allergy [Z91.010]  Yes     Priority: 24     Discharge planning  [Z02.9]  Not Applicable     Priority: 25 - Low      Resolved Hospital Problems   No resolved problems to display.       Inpatient Medications Prescribed for Management of Current Problems:     Scheduled Meds:    [START ON 2/6/2023]  carvediloL  12.5 mg Oral BID WM    cholecalciferol (vitamin D3)  5,000 Units Oral Daily    EScitalopram oxalate  10 mg Oral Daily    furosemide  40 mg Oral BID    guaiFENesin  600 mg Oral BID    levothyroxine  200 mcg Oral Before breakfast    montelukast  5 mg Oral QHS    NIFEdipine  30 mg Oral Nightly    polyethylene glycol  17 g Oral BID    senna-docusate 8.6-50 mg  1 tablet Oral BID    sevelamer carbonate  800 mg Oral TID WM    sodium bicarbonate  1,300 mg Oral TID     Continuous Infusions:   As Needed: acetaminophen, hydrALAZINE, oxyCODONE, traZODone    VTE Risk Mitigation (From admission, onward)         Ordered     Place sequential compression device  Until discontinued         02/02/23 2012                I have completed this tele-visit without the assistance of a telepresenter.    The attending portion of this evaluation, treatment, and documentation was performed per Arlene Paul MD via Telemedicine AudioVisual using the secure Vidyo software platform with 2 way audio/video. The provider was located off-site and the patient is located in the hospital. The aforementioned video software was utilized to document the relevant history and physical exam. Secure Safe [employer] Clear software platform was used instead of [employer] for this visit.      Arlene Paul MD  Department of Hospital Medicine   [facility] - Intensive Care ([place])

## 2023-02-06 NOTE — SUBJECTIVE & OBJECTIVE
Interval History: [facility] follow up visit for suspected Shortness of breath [R06.02]  Hypoxia [R09.02]  Fever [R50.9] present on admission.   This service was provided by telemedicine.    Patient was transferred to [facility] on: 2/4/2023  The patient location is: [id number] A   Admitted 2/1/2023 12:25 PM    The patient is able to provide adequate history. Additional history was obtained from: chart review.  No significant events overnight reported.  Patient reports complaints of weakness. Comfortable on RA at rest this PM after lasix increased this AM but SpO2 is < 90%. Endorses dyspnea on exertion. Diuresis discussed with Nephrology. Had BM today.  Symptoms are stable since yesterday.     I have reviewed the following on 2/6/2023:     Details     [x]   Lab results  H&H stable    []   Micro reports     []   Pathology reports     []   Imaging reports     []   Cardiology Procedure reports     []   Outside records/CareEverywhere     []  Independently viewed:       Review of Systems   Constitutional:  Positive for fatigue. Negative for fever.     Objective:     Vital Signs (Most Recent):  Temp: 98.7 °F (37.1 °C) (02/06/23 1542)  Pulse: 79 (02/06/23 1542)  Resp: 18 (02/06/23 1542)  BP: 129/61 (02/06/23 1542)  SpO2: (!) 87 % (02/06/23 1542)   Vital Signs (24h Range):  Temp:  [98 °F (36.7 °C)-99.1 °F (37.3 °C)] 98.7 °F (37.1 °C)  Pulse:  [72-79] 79  Resp:  [16-20] 18  SpO2:  [69 %-99 %] 87 %  BP: (129-183)/(61-81) 129/61     Weight: 104.3 kg (230 lb)  Body mass index is 36.02 kg/m².    Intake/Output Summary (Last 24 hours) at 2/6/2023 1616  Last data filed at 2/6/2023 1518  Gross per 24 hour   Intake 720 ml   Output --   Net 720 ml        Physical Exam  Constitutional:       Appearance: She is obese.   Cardiovascular:      Comments: Monitor and/or Vital signs reviewed at time of visit  Pulmonary:      Effort: Pulmonary effort is normal. No accessory muscle usage or respiratory distress.   Musculoskeletal:       Right lower leg: Edema present.      Left lower leg: Edema present.   Neurological:      Mental Status: She is alert. She is not disoriented.   Psychiatric:         Attention and Perception: Attention normal.         Mood and Affect: Affect normal.         Behavior: Behavior is cooperative.         Cognition and Memory: Memory is impaired.       Significant Labs:   Recent Labs   Lab 09/07/22  1047 11/17/22  0813 02/02/23  2130   HGBA1C 6.8* 5.5 5.2       Recent Labs   Lab 02/03/23  0424 02/04/23  0556 02/06/23  0431   WBC 11.04 11.90 9.57   HGB 8.6* 8.6* 8.4*   HCT 27.8* 28.3* 27.6*   * 117* 151       Recent Labs   Lab 02/03/23  0424 02/04/23  0556 02/06/23  0431   GRAN 67.6  7.5 74.5*  8.9* 71.3  6.8   LYMPH 21.7  2.4 14.4*  1.7 16.8*  1.6   MONO 8.6  1.0 8.7  1.0 8.7  0.8   EOS 0.1 0.1 0.2       Recent Labs   Lab 02/04/23  0556 02/05/23  1001 02/06/23  0431   * 135* 134*   K 4.4 4.1 4.1    101 101   CO2 16* 21* 22*   BUN 47* 50* 52*   CREATININE 4.2* 4.3* 4.0*   GLU 81 149* 98   CALCIUM 8.2* 8.2* 8.4*   ALBUMIN 3.1* 3.0* 3.0*   MG 1.8  --   --    PHOS 5.5* 5.2* 4.4       Recent Labs   Lab 02/01/23  1302   ALKPHOS 69   ALT 5*   AST 22   PROT 7.4   BILITOT 0.3       Recent Labs   Lab 02/01/23  1302   TROPONINI 0.010       Recent Labs   Lab 12/19/22  1031 01/27/23  1004   FERRITIN 108 61       Results for orders placed or performed in visit on 01/27/23   Vitamin D   Result Value Ref Range    Vit D, 25-Hydroxy 43 30 - 96 ng/mL     SARS-CoV2 (COVID-19) Qualitative PCR (no units)   Date Value   02/04/2023 Not Detected   02/01/2023 Not Detected     SARS-CoV-2 RNA, Amplification, Qual (no units)   Date Value   10/23/2020 Negative     POC Rapid COVID (no units)   Date Value   07/01/2021 Positive (A)       ECG Results              EKG 12-lead (Final result)  Result time 02/01/23 15:13:56      Final result by Interface, Lab In [facility] (02/01/23 15:13:56)                   Narrative:    Test  Reason : R06.02,    Vent. Rate : 076 BPM     Atrial Rate : 076 BPM     P-R Int : 196 ms          QRS Dur : 092 ms      QT Int : 394 ms       P-R-T Axes : 051 044 042 degrees     QTc Int : 443 ms    Normal sinus rhythm  Low anterior forces  Abnormal ECG  When compared with ECG of 31-JAN-2023 13:45,  No significant change was found  Confirmed by Mark TREJO MD ([id number]) on 2/1/2023 3:13:50 PM    Referred By: [email or web address]   SELF           Confirmed By:Mark TREJO MD                                    Results for orders placed during the hospital encounter of 09/11/22    Echo    Interpretation Summary  · Severe left atrial enlargement.  · The left ventricle is normal in size with mild eccentric hypertrophy and normal systolic function.  · The estimated ejection fraction is 65%.  · Indeterminate left ventricular diastolic function.  · Normal right ventricular size with normal right ventricular systolic function.  · Mild mitral regurgitation.  · Normal central venous pressure (3 mmHg).      Echo  · Moderate left atrial enlargement.  · The left ventricle is normal in size with concentric hypertrophy and   normal systolic function.  · Left ventricular diastolic dysfunction.  · Normal right ventricular size with normal right ventricular systolic   function.  · Mild mitral regurgitation.  · Moderate tricuspid regurgitation.  · Intermediate central venous pressure (8 mmHg).  · There is pulmonary hypertension. The estimated PA systolic pressure is   58 mmHg.  · Small lateral pericardial effusion.  · The estimated PA systolic pressure is 58 mmHg.         Labs and Imaging within the last 24 hours listed above were reviewed.

## 2023-02-06 NOTE — PLAN OF CARE
Problem: Adult Inpatient Plan of Care  Goal: Plan of Care Review  Outcome: Ongoing, Progressing     Problem: Adult Inpatient Plan of Care  Goal: Patient-Specific Goal (Individualized)  Outcome: Ongoing, Progressing     Problem: Diabetes Comorbidity  Goal: Blood Glucose Level Within Targeted Range  Outcome: Ongoing, Progressing     Problem: Fall Injury Risk  Goal: Absence of Fall and Fall-Related Injury  Outcome: Ongoing, Progressing     Problem: Gas Exchange Impaired  Goal: Optimal Gas Exchange  Outcome: Ongoing, Progressing

## 2023-02-06 NOTE — ASSESSMENT & PLAN NOTE
Chest X-ray clear. Checked chest CT. No clear etiology found: just increased fluid and atelectasis.   Checked ventilation-perfusion scan to rule out pulmonary embolism; low prob.  Continue Lasix. Wean O2 as tolerated.  No improvement; Echo ordered 2/5. Check CVP, suspect volume overload.

## 2023-02-06 NOTE — NURSING
Home Oxygen Evaluation    Date Performed: 2023    1) Patient's Home O2 Sat on room air, while at rest: 93%        If O2 sats on room air at rest are 88% or below, patient qualifies. No additional testing needed. Document N/A in steps 2 and 3. If 89% or above, complete steps 2.      2) Patient's O2 Sat on room air while exercisin%        If O2 sats on room air while exercising remain 89% or above patient does not qualify, no further testing needed Document N/A in step 3. If O2 sats on room air while exercising are 88% or below, continue to step 3.      3) Patient's O2 Sat while exercising on O2: 95% at 3 LPM         (Must show improvement from #2 for patients to qualify)    If O2 sats improve on oxygen, patient qualifies for portable oxygen. If not, the patient does not qualify.

## 2023-02-07 DIAGNOSIS — Z00.00 ENCOUNTER FOR MEDICARE ANNUAL WELLNESS EXAM: ICD-10-CM

## 2023-02-07 LAB
ALBUMIN SERPL BCP-MCNC: 3.2 G/DL (ref 3.5–5.2)
ANION GAP SERPL CALC-SCNC: 12 MMOL/L (ref 8–16)
BUN SERPL-MCNC: 46 MG/DL (ref 6–20)
CALCIUM SERPL-MCNC: 8.7 MG/DL (ref 8.7–10.5)
CHLORIDE SERPL-SCNC: 102 MMOL/L (ref 95–110)
CO2 SERPL-SCNC: 24 MMOL/L (ref 23–29)
CREAT SERPL-MCNC: 4 MG/DL (ref 0.5–1.4)
ERYTHROCYTE [DISTWIDTH] IN BLOOD BY AUTOMATED COUNT: 14.1 % (ref 11.5–14.5)
EST. GFR  (NO RACE VARIABLE): 12.2 ML/MIN/1.73 M^2
GLUCOSE SERPL-MCNC: 103 MG/DL (ref 70–110)
HCT VFR BLD AUTO: 31.7 % (ref 37–48.5)
HGB BLD-MCNC: 9.9 G/DL (ref 12–16)
MCH RBC QN AUTO: 24.7 PG (ref 27–31)
MCHC RBC AUTO-ENTMCNC: 31.2 G/DL (ref 32–36)
MCV RBC AUTO: 79 FL (ref 82–98)
PHOSPHATE SERPL-MCNC: 3.6 MG/DL (ref 2.7–4.5)
PLATELET # BLD AUTO: 197 K/UL (ref 150–450)
PMV BLD AUTO: 10.9 FL (ref 9.2–12.9)
POTASSIUM SERPL-SCNC: 4.1 MMOL/L (ref 3.5–5.1)
PROVIDER SIGNING NAME: NORMAL
RBC # BLD AUTO: 4.01 M/UL (ref 4–5.4)
SODIUM SERPL-SCNC: 138 MMOL/L (ref 136–145)
VON WILLEBRAND EVAL PPP-IMP: NORMAL
WBC # BLD AUTO: 10.87 K/UL (ref 3.9–12.7)

## 2023-02-07 PROCEDURE — 36415 COLL VENOUS BLD VENIPUNCTURE: CPT | Mod: HCNC,NTX | Performed by: INTERNAL MEDICINE

## 2023-02-07 PROCEDURE — 25000003 PHARM REV CODE 250: Mod: HCNC,NTX | Performed by: INTERNAL MEDICINE

## 2023-02-07 PROCEDURE — 97116 GAIT TRAINING THERAPY: CPT | Mod: HCNC,NTX

## 2023-02-07 PROCEDURE — 99900035 HC TECH TIME PER 15 MIN (STAT): Mod: HCNC,NTX

## 2023-02-07 PROCEDURE — 99233 PR SUBSEQUENT HOSPITAL CARE,LEVL III: ICD-10-PCS | Mod: HCNC,95,NTX, | Performed by: INTERNAL MEDICINE

## 2023-02-07 PROCEDURE — 86706 HEP B SURFACE ANTIBODY: CPT | Mod: 91,HCNC,NTX | Performed by: INTERNAL MEDICINE

## 2023-02-07 PROCEDURE — 94799 UNLISTED PULMONARY SVC/PX: CPT | Mod: HCNC,NTX

## 2023-02-07 PROCEDURE — 25000003 PHARM REV CODE 250: Mod: HCNC,NTX

## 2023-02-07 PROCEDURE — 12000002 HC ACUTE/MED SURGE SEMI-PRIVATE ROOM: Mod: HCNC,NTX

## 2023-02-07 PROCEDURE — 80069 RENAL FUNCTION PANEL: CPT | Mod: HCNC,NTX | Performed by: INTERNAL MEDICINE

## 2023-02-07 PROCEDURE — 99232 SBSQ HOSP IP/OBS MODERATE 35: CPT | Mod: HCNC,NTX,, | Performed by: INTERNAL MEDICINE

## 2023-02-07 PROCEDURE — 86709 HEPATITIS A IGM ANTIBODY: CPT | Mod: HCNC,NTX | Performed by: INTERNAL MEDICINE

## 2023-02-07 PROCEDURE — 97165 OT EVAL LOW COMPLEX 30 MIN: CPT | Mod: HCNC,NTX

## 2023-02-07 PROCEDURE — 97161 PT EVAL LOW COMPLEX 20 MIN: CPT | Mod: HCNC,NTX

## 2023-02-07 PROCEDURE — 99233 SBSQ HOSP IP/OBS HIGH 50: CPT | Mod: HCNC,95,NTX, | Performed by: INTERNAL MEDICINE

## 2023-02-07 PROCEDURE — 94761 N-INVAS EAR/PLS OXIMETRY MLT: CPT | Mod: HCNC,NTX

## 2023-02-07 PROCEDURE — 99232 PR SUBSEQUENT HOSPITAL CARE,LEVL II: ICD-10-PCS | Mod: HCNC,NTX,, | Performed by: INTERNAL MEDICINE

## 2023-02-07 PROCEDURE — 63600175 PHARM REV CODE 636 W HCPCS: Mod: HCNC,NTX | Performed by: INTERNAL MEDICINE

## 2023-02-07 PROCEDURE — 87340 HEPATITIS B SURFACE AG IA: CPT | Mod: HCNC,NTX | Performed by: INTERNAL MEDICINE

## 2023-02-07 PROCEDURE — 86704 HEP B CORE ANTIBODY TOTAL: CPT | Mod: HCNC,NTX | Performed by: INTERNAL MEDICINE

## 2023-02-07 PROCEDURE — 86580 TB INTRADERMAL TEST: CPT | Mod: HCNC,NTX | Performed by: INTERNAL MEDICINE

## 2023-02-07 PROCEDURE — 27000646 HC AEROBIKA DEVICE: Mod: HCNC,NTX

## 2023-02-07 PROCEDURE — 30200315 PPD INTRADERMAL TEST REV CODE 302: Mod: HCNC,NTX | Performed by: INTERNAL MEDICINE

## 2023-02-07 PROCEDURE — 25000003 PHARM REV CODE 250: Mod: HCNC,NTX | Performed by: HOSPITALIST

## 2023-02-07 PROCEDURE — 94664 DEMO&/EVAL PT USE INHALER: CPT | Mod: HCNC,NTX

## 2023-02-07 PROCEDURE — 97535 SELF CARE MNGMENT TRAINING: CPT | Mod: HCNC,NTX

## 2023-02-07 PROCEDURE — 85027 COMPLETE CBC AUTOMATED: CPT | Mod: HCNC,NTX | Performed by: INTERNAL MEDICINE

## 2023-02-07 PROCEDURE — 25000003 PHARM REV CODE 250: Mod: HCNC,NTX | Performed by: NURSE PRACTITIONER

## 2023-02-07 RX ORDER — DOXYLAMINE SUCCINATE 25 MG
TABLET ORAL 2 TIMES DAILY
Status: DISCONTINUED | OUTPATIENT
Start: 2023-02-07 | End: 2023-02-10 | Stop reason: HOSPADM

## 2023-02-07 RX ORDER — MUPIROCIN 20 MG/G
OINTMENT TOPICAL 2 TIMES DAILY
Status: DISCONTINUED | OUTPATIENT
Start: 2023-02-07 | End: 2023-02-10 | Stop reason: HOSPADM

## 2023-02-07 RX ORDER — HYDROXYZINE HYDROCHLORIDE 25 MG/1
25 TABLET, FILM COATED ORAL 3 TIMES DAILY PRN
Status: DISCONTINUED | OUTPATIENT
Start: 2023-02-07 | End: 2023-02-07

## 2023-02-07 RX ORDER — DIPHENHYDRAMINE HCL 25 MG
25 CAPSULE ORAL EVERY 6 HOURS PRN
Status: DISCONTINUED | OUTPATIENT
Start: 2023-02-07 | End: 2023-02-10 | Stop reason: HOSPADM

## 2023-02-07 RX ADMIN — OXYCODONE HYDROCHLORIDE 5 MG: 5 TABLET ORAL at 09:02

## 2023-02-07 RX ADMIN — HYDROXYZINE HYDROCHLORIDE 25 MG: 25 TABLET, FILM COATED ORAL at 02:02

## 2023-02-07 RX ADMIN — SEVELAMER CARBONATE 800 MG: 800 TABLET, FILM COATED ORAL at 09:02

## 2023-02-07 RX ADMIN — LEVOTHYROXINE SODIUM 200 MCG: 100 TABLET ORAL at 05:02

## 2023-02-07 RX ADMIN — HYDROXYZINE HYDROCHLORIDE 25 MG: 25 TABLET, FILM COATED ORAL at 06:02

## 2023-02-07 RX ADMIN — POLYETHYLENE GLYCOL 3350 17 G: 17 POWDER, FOR SOLUTION ORAL at 08:02

## 2023-02-07 RX ADMIN — SODIUM BICARBONATE 1300 MG: 650 TABLET ORAL at 08:02

## 2023-02-07 RX ADMIN — SENNOSIDES AND DOCUSATE SODIUM 1 TABLET: 50; 8.6 TABLET ORAL at 08:02

## 2023-02-07 RX ADMIN — MONTELUKAST SODIUM 5 MG: 5 TABLET, CHEWABLE ORAL at 09:02

## 2023-02-07 RX ADMIN — DIPHENHYDRAMINE HYDROCHLORIDE 25 MG: 25 CAPSULE ORAL at 09:02

## 2023-02-07 RX ADMIN — GUAIFENESIN 600 MG: 600 TABLET, EXTENDED RELEASE ORAL at 08:02

## 2023-02-07 RX ADMIN — NIFEDIPINE 60 MG: 30 TABLET, FILM COATED, EXTENDED RELEASE ORAL at 08:02

## 2023-02-07 RX ADMIN — CARVEDILOL 12.5 MG: 12.5 TABLET, FILM COATED ORAL at 05:02

## 2023-02-07 RX ADMIN — POLYETHYLENE GLYCOL 3350 17 G: 17 POWDER, FOR SOLUTION ORAL at 09:02

## 2023-02-07 RX ADMIN — GUAIFENESIN 600 MG: 600 TABLET, EXTENDED RELEASE ORAL at 09:02

## 2023-02-07 RX ADMIN — CHOLECALCIFEROL TAB 125 MCG (5000 UNIT) 5000 UNITS: 125 TAB at 09:02

## 2023-02-07 RX ADMIN — TUBERCULIN PURIFIED PROTEIN DERIVATIVE 5 UNITS: 5 INJECTION, SOLUTION INTRADERMAL at 11:02

## 2023-02-07 RX ADMIN — SEVELAMER CARBONATE 800 MG: 800 TABLET, FILM COATED ORAL at 12:02

## 2023-02-07 RX ADMIN — FUROSEMIDE 80 MG: 10 INJECTION, SOLUTION INTRAMUSCULAR; INTRAVENOUS at 05:02

## 2023-02-07 RX ADMIN — SODIUM BICARBONATE 1300 MG: 650 TABLET ORAL at 09:02

## 2023-02-07 RX ADMIN — SENNOSIDES AND DOCUSATE SODIUM 1 TABLET: 50; 8.6 TABLET ORAL at 09:02

## 2023-02-07 RX ADMIN — SODIUM BICARBONATE 1300 MG: 650 TABLET ORAL at 02:02

## 2023-02-07 RX ADMIN — SEVELAMER CARBONATE 800 MG: 800 TABLET, FILM COATED ORAL at 05:02

## 2023-02-07 RX ADMIN — ESCITALOPRAM OXALATE 10 MG: 10 TABLET ORAL at 09:02

## 2023-02-07 RX ADMIN — CARVEDILOL 12.5 MG: 12.5 TABLET, FILM COATED ORAL at 09:02

## 2023-02-07 RX ADMIN — FUROSEMIDE 80 MG: 10 INJECTION, SOLUTION INTRAMUSCULAR; INTRAVENOUS at 09:02

## 2023-02-07 NOTE — PT/OT/SLP EVAL
"Physical Therapy Co-Evaluation    Patient Name:  Kathie Quezada   MRN:  [id number]    Recommendations:     Discharge Recommendations: home health PT   Discharge Equipment Recommendations: walker, rolling   Barriers to discharge: Pt currently requiring increased level of assistance with mobility    Assessment:     Kathie Quezada is a 62 y.o. female admitted with a medical diagnosis of Acute hypoxemic respiratory failure.  She presents with the following impairments/functional limitations: weakness, impaired endurance, impaired functional mobility, gait instability, impaired balance, decreased lower extremity function. Pleasant and cooperative, follows simple commands. Pt presented with mild unsteadiness with amb using UE for external support. With RW, pt demonstrated improved overall steadiness and linda. Pt will benefit from skilled PT services while in house in order to address the aforementioned deficits.      Rehab Prognosis: Good; patient would benefit from acute skilled PT services to address these deficits and reach maximum level of function.    Recent Surgery: * No surgery found *      Plan:     During this hospitalization, patient to be seen 3 x/week to address the identified rehab impairments via gait training, therapeutic activities, therapeutic exercises, neuromuscular re-education and progress toward the following goals:    Plan of Care Expires:       Subjective     "My son will stay whenever he's not working"    Pain/Comfort:  Pain Rating 1: 0/10  Pain Rating Post-Intervention 1: 0/10    Patients cultural, spiritual, Evangelical conflicts given the current situation: no    Living Environment:  Per pt, pt resides alone in [facility] with 1 TALI. Pt has tub/shower combo. Pt reported son is a , and he stays whenever he's not on the road.  Prior to admission, patients level of function was independent.  Equipment used at home: shower chair.  DME owned (not currently used): shower chair. "  Upon discharge, patient will have limited assistance from son, when he's in town.    Objective:     Communicated with RN prior to session.  Patient found HOB elevated with peripheral IV, telemetry  upon PT entry to room.    General Precautions: Standard, fall  Orthopedic Precautions:N/A   Braces: N/A  Respiratory Status: Room air    Exams:  RLE ROM: WFL  RLE Strength: Grossly 3/5  LLE ROM: WFL  LLE Strength: Grossly 3/5    Functional Mobility:  Bed Mobility:     Scooting: stand by assistance  Supine to Sit: minimum assistance  Transfers:    Sit to Stand:    contact guard assistance with no AD  SBA with RW  Bed to Chair: stand by assistance with  rolling walker  using  Step Transfer  Toilet Transfer: contact guard assistance with  rolling walker and grab bars  using  Step Transfer  Gait: Pt amb 20' no AD CGA. Pt presented with mild unsteadiness, increased lateral truncal sway, UE reaching for external support. Pt amb 10' + 10' RW SBA with toilet transfer in between bouts. Pt presented with improved linda, B stride length, steadiness.  Balance:   Good sitting balance  Fair standing balance RW      AM-PAC 6 CLICK MOBILITY  Total Score:18       Treatment & Education:  Tolerated dynamic standing at sink  Discussed sitting upright in chair >1hr, pt agreeable  Discussed amb to restroom with RW and RN/staff outside of therapy services    Educated pt on PT role/POC  Educated pt on importance of OOB activity and daily ambulation   Pt educated on proper body mechanics, safety techniques, and energy conservation with PT facilitation and cueing throughout session   Pt verbalized understanding      Patient left up in chair with all lines intact, call button in reach, RN notified, and OT present.    GOALS:   Multidisciplinary Problems       Physical Therapy Goals          Problem: Physical Therapy    Goal Priority Disciplines Outcome Goal Variances Interventions   Physical Therapy Goal     PT, PT/OT Ongoing, Progressing      Description: Goals to be met by: 2023     Patient will increase functional independence with mobility by performin. Gait  x 150 feet with Supervision using LRAD.   2. Ascend/descend 1 stair with no Handrails Contact Guard Assistance using LRAD.                          History:     Past Medical History:   Diagnosis Date    Acute respiratory failure with hypoxia and hypercapnia 2019    ATN (acute tubular necrosis) 2019    Chronic diastolic heart failure 2019    2-8-19 Mild left atrial enlargement. Mild left ventricular enlargement. Normal left ventricular systolic function. The estimated ejection fraction is 55% Indeterminate left ventricular diastolic function. Normal right ventricular systolic function. Mild mitral regurgitation. Mild to moderate tricuspid regurgitation. The estimated PA systolic pressure is 32 mm Hg Normal central venous pressure (3 m    CKD (chronic kidney disease) stage 4, GFR 15-29 ml/min 2018    CKD stage G3a/A3, GFR 45-59 and albumin creatinine ratio >300 mg/g 2018    CKD stage G3b/A3, GFR 30-44 and albumin creatinine ratio >300 mg/g 2018    Closed compression fracture of L2 lumbar vertebra 2019    Closed compression fracture of L2 lumbar vertebra 2019     IMO Regulatory Load 2019    Controlled type 2 diabetes with retinopathy 2018    COVID-19 virus infection 2021    CVA (cerebral vascular accident) 2017    Diabetic polyneuropathy associated with type 2 diabetes mellitus 2020    Encounter for blood transfusion     Essential hypertension 2018    Factor XI deficiency 1978    Require FFP for surgeries.  7 Factor XI Activity 55 - 145 % 96       Gastroesophageal reflux disease without esophagitis 2019    Generalized anxiety disorder 2019    GERD (gastroesophageal reflux disease)     GIB (gastrointestinal bleeding) 10/18/2018    History of CVA (cerebrovascular accident)  without residual deficits 11/27/2022    History of hepatitis C     s/p succesful Rx w/ Harvoni  - [id number] (cure) 2016    Hypertensive retinopathy, bilateral 12/10/2019    Mild nonproliferative diabetic retinopathy 05/02/2018    Mixed hyperlipidemia 04/09/2018    Myalgia due to statin 09/07/2022    Normocytic anemia 01/14/2022    NS (nuclear sclerosis), bilateral 12/10/2019    PNA (pneumonia) 05/28/2019    Postoperative hypothyroidism     Renovascular hypertension 04/09/2018    S/P kyphoplasty 08/14/2019 8/14/19: L2 kyphoplasty with Dr. Kwon     Slow transit constipation 05/27/2019    Type 2 diabetes mellitus with circulatory disorder, with long-term current use of insulin 04/09/2018    Type 2 diabetes mellitus with stage 3 chronic kidney disease, without long-term current use of insulin 04/09/2018    Type 2 diabetes mellitus with stage 3a chronic kidney disease, without long-term current use of insulin 03/03/2021    Type 2 diabetes mellitus with stage 3b chronic kidney disease, without long-term current use of insulin 09/07/2022       Past Surgical History:   Procedure Laterality Date    AV FISTULA PLACEMENT Left 10/27/2022    Procedure: CREATION, AV FISTULA;  Surgeon: Tirso Tavera MD;  [facility];  Service: Peripheral Vascular;  Laterality: Left;  Brachio basilic 1st stage     BACK SURGERY  2017    CHOLECYSTECTOMY  2015    [facility]    COLONOSCOPY  11/13/2015    Dr Chino torres prep. internal hemorrhoids. diverticulosis of sigmois. NO polyps. repeat due 2025    FISTULOGRAM N/A 12/16/2022    Procedure: Fistulogram;  Surgeon: Tirso Tavera MD;  Location: [facility] CATH LAB;  Service: Peripheral Vascular;  Laterality: N/A;    FIXATION KYPHOPLASTY Bilateral 08/14/2019    Procedure: L2 kyphoplasty Fluoro Globus;  Surgeon: Jeremie Kwon DO;  Location: [facility] OR;  Service: Neurosurgery;  Laterality: Bilateral;  GLOBUS MACRINA WINSLOW [phone] TEXTED HER @ 10:54AM ON 7-30-19  PRE-OP-BY RN  8-7-2019     HYSTERECTOMY      OOPHORECTOMY  1996    one    PERCUTANEOUS TRANSLUMINAL ANGIOPLASTY OF ARTERIOVENOUS FISTULA Left 12/16/2022    Procedure: PTA, AV FISTULA;  Surgeon: Tirso Tavera MD;  Location: [facility] CATH LAB;  Service: Peripheral Vascular;  Laterality: Left;    THYROIDECTOMY  2016    at [facility] for thyroid nodule    TONSILLECTOMY      TRANSPOSITION OF BASILIC VEIN Left 1/31/2023    Procedure: TRANSPOSITION, VEIN, BASILIC;  Surgeon: Tirso Tavera MD;  Location: [facility] OR [address] [place] [address];  Service: Peripheral Vascular;  Laterality: Left;       Time Tracking:     PT Received On: 02/07/23  PT Start Time: 1024     PT Stop Time: 1045  PT Total Time (min): 21 min     Billable Minutes: Evaluation 10 and Gait Training 11    Co-treatment performed due to patient's multiple deficits requiring two skilled therapists to appropriately and safely assess patient's strength and endurance while facilitating functional tasks in addition to accommodating for patient's activity tolerance.       02/07/2023

## 2023-02-07 NOTE — ASSESSMENT & PLAN NOTE
No clear etiology  Blood cultures collected. Monitor. Follow up cultures - negative.  Remains afebrile.

## 2023-02-07 NOTE — PROGRESS NOTES
[facility] - Intensive Care ([address] Medicine  Telemedicine Progress Note    Patient Name: Kathie Quezada  MRN: [id number]  Patient Class: IP- Inpatient   Admission Date: 2/1/2023  Length of Stay: 5 days  Attending Physician: Arelne Paul MD  Primary Care Provider: Elan Price MD      Subjective:     Principal Problem:Acute hypoxemic respiratory failure    HPI:  Kathie Quezada is a 62 year old Black woman with obesity, hypertension, diabetes mellitus type 2 with retinopathy and polyneuropathy, chronic diastolic heart failure, chronic kidney disease stage 5, anemia, history of stroke, history of thyroidectomy in 2016 with postoperative hypothyroidism, 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 She lives in [facility] by herself. She is . Her primary care physician is Dr. Elan Price. Her nephrologist is Dr. Fred Kelly.    She had a left arm AV fistula creation on 10/27/2022. She had balloon angioplasty of the fistula on 12/16/2022. She had transposition of the fistula on 1/31/2023. After the procedure, she felt short of breath and developed fever (measured as 101° Fahrenheit at home the next morning). Shortness of breath was not worsened or relieved by positional changes. She contacted the vascular surgeon, who advised her to go to the emergency department. She went to [facility]. She reported lower extremity edema, memory changes, confusion, poor appetite, and nausea that had been going on for a long time prior to the procedure. She was found to have hypoxia with oxygen saturation of 88%. Temperature was 101° Fahrenheit. WBC was elevated [place]  [address]/uL. Chest X-ray showed clear lungs. Influenza, RSV, and COVID-19 were negative. She was admitted to [facility] Medicine Team B.      Overview/Hospital Course:  No notes on file    Interval History: Virtual follow up visit for suspected Shortness of breath [R06.02]  Hypoxia [R09.02]  Fever [R50.9] present on admission.   This service was provided by telemedicine.    Patient was transferred to [facility] on: 2/4/2023  The [facility]: [facility]   Admitted 2/1/2023 12:25 PM    The patient is able to provide adequate history. Additional history was obtained from: chart review.  No significant events overnight reported.  Patient reports complaints of weakness. Comfortable on RA at rest this PM after lasix increased this AM but SpO2 is < 90%. Endorses dyspnea on exertion. Diuresis discussed with Nephrology. Had BM today.  Symptoms are stable since yesterday.     I have reviewed the following on 2/6/2023:     Details     [x]   Lab results  H&H stable    []   Micro reports     []   Pathology reports     []   Imaging reports     []   Cardiology Procedure reports     []   Outside records/CareEverywhere     []  Independently viewed:       Review of Systems   Constitutional:  Positive for fatigue. Negative for fever.     Objective:     Vital Signs (Most Recent):  Temp: 98.7 °F (37.1 °C) (02/06/23 1542)  Pulse: 79 (02/06/23 1542)  Resp: 18 (02/06/23 1542)  BP: 129/61 (02/06/23 1542)  SpO2: (!) 87 % (02/06/23 1542)   Vital Signs (24h Range):  Temp:  [98 °F (36.7 °C)-99.1 °F (37.3 °C)] 98.7 °F (37.1 °C)  Pulse:  [72-79] 79  Resp:  [16-20] 18  SpO2:  [69 %-99 %] 87 %  BP: (129-183)/(61-81) 129/61     Weight: 104.3 kg (230 lb)  Body mass index is 36.02 kg/m².    Intake/Output Summary (Last 24 hours) at 2/6/2023 1616  Last data filed at 2/6/2023 1518  Gross per 24 hour   Intake 720 ml   Output --   Net 720 ml        Physical Exam  Constitutional:       Appearance: She is obese.   Cardiovascular:       Comments: Monitor and/or Vital signs reviewed at time of visit  Pulmonary:      Effort: Pulmonary effort is normal. No accessory muscle usage or respiratory distress.   Musculoskeletal:      Right lower leg: Edema present.      Left lower leg: Edema present.   Neurological:      Mental Status: She is alert. She is not disoriented.   Psychiatric:         Attention and Perception: Attention normal.         Mood and Affect: Affect normal.         Behavior: Behavior is cooperative.         Cognition and Memory: Memory is impaired.       Significant Labs:   Recent Labs   Lab 09/07/22  1047 11/17/22  0813 02/02/23  2130   HGBA1C 6.8* 5.5 5.2       Recent Labs   Lab 02/03/23  0424 02/04/23  0556 02/06/23  0431   WBC 11.04 11.90 9.57   HGB 8.6* 8.6* 8.4*   HCT 27.8* 28.3* 27.6*   * 117* 151       Recent Labs   Lab 02/03/23  0424 02/04/23  0556 02/06/23  0431   GRAN 67.6  7.5 74.5*  8.9* 71.3  6.8   LYMPH 21.7  2.4 14.4*  1.7 16.8*  1.6   MONO 8.6  1.0 8.7  1.0 8.7  0.8   EOS 0.1 0.1 0.2       Recent Labs   Lab 02/04/23  0556 02/05/23  1001 02/06/23  0431   * 135* 134*   K 4.4 4.1 4.1    101 101   CO2 16* 21* 22*   BUN 47* 50* 52*   CREATININE 4.2* 4.3* 4.0*   GLU 81 149* 98   CALCIUM 8.2* 8.2* 8.4*   ALBUMIN 3.1* 3.0* 3.0*   MG 1.8  --   --    PHOS 5.5* 5.2* 4.4       Recent Labs   Lab 02/01/23  1302   ALKPHOS 69   ALT 5*   AST 22   PROT 7.4   BILITOT 0.3       Recent Labs   Lab 02/01/23  1302   TROPONINI 0.010       Recent Labs   Lab 12/19/22  1031 01/27/23  1004   FERRITIN 108 61       Results for orders placed or performed in visit on 01/27/23   Vitamin D   Result Value Ref Range    Vit D, 25-Hydroxy 43 30 - 96 ng/mL     SARS-CoV2 (COVID-19) Qualitative PCR (no units)   Date Value   02/04/2023 Not Detected   02/01/2023 Not Detected     SARS-CoV-2 RNA, Amplification, Qual (no units)   Date Value   10/23/2020 Negative     POC Rapid COVID (no units)   Date Value   07/01/2021 Positive (A)        ECG Results              EKG 12-lead (Final result)  Result time 02/01/23 15:13:56      Final result by Interface, Lab In [facility] (02/01/23 15:13:56)                   Narrative:    Test Reason : R06.02,    Vent. Rate : 076 BPM     Atrial Rate : 076 BPM     P-R Int : 196 ms          QRS Dur : 092 ms      QT Int : 394 ms       P-R-T Axes : 051 044 042 degrees     QTc Int : 443 ms    Normal sinus rhythm  Low anterior forces  Abnormal ECG  When compared with ECG of 31-JAN-2023 13:45,  No significant change was found  Confirmed by Mark TREJO MD ([id number]) on 2/1/2023 3:13:50 PM    Referred By: [email or web address]   SELF           Confirmed By:Mark TREJO MD                                    Results for orders placed during the hospital encounter of 09/11/22    Echo    Interpretation Summary  · Severe left atrial enlargement.  · The left ventricle is normal in size with mild eccentric hypertrophy and normal systolic function.  · The estimated ejection fraction is 65%.  · Indeterminate left ventricular diastolic function.  · Normal right ventricular size with normal right ventricular systolic function.  · Mild mitral regurgitation.  · Normal central venous pressure (3 mmHg).      Echo  · Moderate left atrial enlargement.  · The left ventricle is normal in size with concentric hypertrophy and   normal systolic function.  · Left ventricular diastolic dysfunction.  · Normal right ventricular size with normal right ventricular systolic   function.  · Mild mitral regurgitation.  · Moderate tricuspid regurgitation.  · Intermediate central venous pressure (8 mmHg).  · There is pulmonary hypertension. The estimated PA systolic pressure is   58 mmHg.  · Small lateral pericardial effusion.  · The estimated PA systolic pressure is 58 mmHg.         Labs and Imaging within the last 24 hours listed above were reviewed.       Assessment/Plan:      * Acute hypoxemic respiratory failure  Chest X-ray clear. Checked chest CT. No clear etiology  found: just increased fluid and atelectasis.   Checked ventilation-perfusion scan to rule out pulmonary embolism; low prob.  Continue Lasix. Wean O2 as tolerated.  No improvement 2/5; Echo ordered. Check CVP, suspect volume overload.  Echo with CVP of 8 and PASP is 58 mmHg; Lasix changed to IV 80 mg BID on 2/6    Postoperative fever  No clear etiology  Blood cultures collected. Monitor. Follow up cultures - negative.  Remains afebrile.    Chronic diastolic heart failure  Continued home furosemide.  Changed Lasix to 80 mg IV BID on 2/6    Chronic kidney disease (CKD), stage V  She reports symptoms indicative of this including chronic edema, nausea, confusion.   Has AV fistula but has not yet started dialysis. Continue sodium bicarbonate, calcitriol.  Consulted Nephrology:  - increased bicarb to 1300 TID, added sevelamer 800 TID  - home dose Lasix 40 PO BID  - changed amlodipine to nifedipine; resumed Coreg  - Can hold calcitriol while inpatient  Trial of diuresis for hypoxia; no indication for HD at this time.    Renovascular hypertension  She takes amlodipine and carvedilol.   Changing amlodipine to nifedipine; added Coreg at lower dose, monitoring HR.  Increased Coreg 2/5; BP improving.    Metabolic bone disease  Started phosphate binder    A-V fistula  Vascular Surgery evaluated it since she had a procedure (baslic vein transposition) prior to developing shortness of breath and fever.  Doubt infection at this time; remains afebrile.    Controlled type 2 diabetes mellitus with stable proliferative retinopathy of both eyes, unspecified whether long term insulin use  Diet controlled.    Postoperative hypothyroidism  Continue home levothyroxine.    Pulmonary hypertension  Patient presenting with hypoxia and PASP of 58  Follow up with Cardiology; OP referral placed.    Chronic allergic rhinitis  Continue home montelukast. She takes levocetirizine.    Generalized anxiety disorder  Continue home escitalopram.    Factor  XI deficiency  Noted.     Discharge planning   Diet: Diet diabetic [facility]; 2000 Calorie; Renal; Fluid - 1500mL  Significant LDAs:   IV Access Type: Peripheral and Dialysis Access  Urinary Catheter Indication if present: Patient Does Not Have Urinary Catheter  Other Lines/Tubes/Drains:     Goals of Care:    Previous admission:  1/31/23  Code Status: Full Code  Likely prognosis:  Fair  Advance Care Planning   Date: 02/04/2023  Patient wishes to continue curative therapies and return to previous level of function.     LINDSEY: 2/8/2023     Code Status: Full Code   Is the patient medically ready for discharge?: No    Reason for patient still in hospital (select all that apply): Patient trending condition and Consult recommendations  Discharge Plan A: Home      High Risk Conditions:  Patient has a condition that poses threat to life and bodily function: Severe Respiratory Distress    The amount of time spent during, and associated with, this encounter was 50 minutes; the nature of the activities performed were:  Preparing to see the patient, chart review  Obtaining and/or receiving separately obtained history   Performing medically appropriate examination and evaluation  Counseling and educating the patient/family/caregiver  Ordering medications, tests, or procedures  Referring to and communicating with other health care professionals  Documenting clinical information in the EHR  Independently interpreting results  Care coordination        Active Hospital Problems    Diagnosis  POA    *Acute hypoxemic respiratory failure [J96.01]  Yes     Priority: 1 - High    Postoperative fever [R50.82]  Yes     Priority: 2     Chronic diastolic heart failure [I50.32]  Yes     Priority: 2      Chronic     2-8-19  · Mild left atrial enlargement.  · Mild left ventricular enlargement.  · Normal left ventricular systolic function. The estimated ejection fraction is 55%  · Indeterminate left ventricular diastolic  function.  · Normal right ventricular systolic function.  · Mild mitral regurgitation.  · Mild to moderate tricuspid regurgitation.  · The estimated PA systolic pressure is 32 mm Hg  · Normal central venous pressure (3 mm Hg).  · Trivial peircumferential pericardial effusion.      Chronic kidney disease (CKD), stage V [N18.5]  Yes     Priority: 3      Chronic    Renovascular hypertension [I15.0]  Yes     Priority: 3      Chronic    Metabolic bone disease [M89.8X9]  Yes     Priority: 4     A-V fistula [I77.0]  Yes     Priority: 5     Type 2 diabetes mellitus with stage 5 chronic kidney disease not on chronic dialysis, without long-term current use of insulin [E11.22, N18.5]  Yes     Priority: 6      Chronic    Controlled type 2 diabetes mellitus with stable proliferative retinopathy of both eyes, unspecified whether long term insulin use [E11.3553]  Yes     Priority: 6      Chronic    Diabetic polyneuropathy associated with type 2 diabetes mellitus [E11.42]  Yes     Priority: 6      Chronic    Postoperative hypothyroidism [E89.0]  Yes     Priority: 8      Chronic    Pulmonary hypertension [I27.20]  Yes     Priority: 13     Chronic allergic rhinitis [J30.9]  Yes     Priority: 18      Chronic    Generalized anxiety disorder [F41.1]  Yes     Priority: 22      Chronic    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]  Yes     Priority: 23      Chronic     Require FFP for surgeries.    7-31-19  Factor XI Activity 55 - 145 % 96            Peanut allergy [Z91.010]  Yes     Priority: 24     Discharge planning  [Z02.9]  Not Applicable     Priority: 25 - Low      Resolved Hospital Problems   No resolved problems to display.       Inpatient Medications Prescribed for Management of Current Problems:     Scheduled Meds:    carvediloL  12.5 mg Oral BID WM    cholecalciferol (vitamin D3)  5,000 Units Oral Daily    EScitalopram oxalate  10 mg Oral Daily    furosemide (LASIX) injection  80 mg Intravenous BID loop    guaiFENesin  600  mg Oral BID    levothyroxine  200 mcg Oral Before breakfast    montelukast  5 mg Oral QHS    NIFEdipine  60 mg Oral Nightly    polyethylene glycol  17 g Oral BID    senna-docusate 8.6-50 mg  1 tablet Oral BID    sevelamer carbonate  800 mg Oral TID WM    sodium bicarbonate  1,300 mg Oral TID     Continuous Infusions:   As Needed: acetaminophen, hydrALAZINE, oxyCODONE, traZODone    VTE Risk Mitigation (From admission, onward)         Ordered     Place sequential compression device  Until discontinued         02/02/23 2012              I have completed this tele-visit without the assistance of a telepresenter.    The attending portion of this evaluation, treatment, and documentation was performed per Arlene Paul MD via Telemedicine AudioVisual using the secure [employer] software platform with 2 way audio/video. The provider was located off-site and the patient is located in the hospital. The aforementioned video software was utilized to document the relevant history and physical exam. Secure [employer] software platform was used instead of [employer] for this visit.      Arlene Paul MD  Department of Hospital Medicine   [facility] - Intensive Care ([place]-[address])

## 2023-02-07 NOTE — SUBJECTIVE & OBJECTIVE
Interval History: NAOE, +1unmeasured+500 cc on lasix 80 IV. Patient reports SOB with ambulation, on RA at rest. Cr stable at 4, BUN 46. Bicarb 24, phos 3.6. Echo shows CVP 8, pulmonary HTN.     Review of patient's allergies indicates:   Allergen Reactions    Atorvastatin Other (See Comments)     Statin induced myalgia    Mushroom Hives    Peanut Anaphylaxis    Bactrim [sulfamethoxazole-trimethoprim] Hives    Gabapentin      Pruritis     Iodine     Peanut butter flavor     Penicillins Hives    Shellfish containing products Itching    Sulfa (sulfonamide antibiotics) Hives     Current Facility-Administered Medications   Medication Frequency    acetaminophen tablet 650 mg Q6H PRN    carvediloL tablet 12.5 mg BID WM    cholecalciferol (vitamin D3) 125 mcg (5,000 unit) tablet 5,000 Units Daily    EScitalopram oxalate tablet 10 mg Daily    furosemide injection 80 mg BID loop    guaiFENesin 12 hr tablet 600 mg BID    hydrALAZINE injection 5 mg Q8H PRN    hydrOXYzine HCL tablet 25 mg TID PRN    levothyroxine tablet 200 mcg Before breakfast    montelukast chewable tablet 5 mg QHS    mupirocin 2 % ointment BID    NIFEdipine 24 hr tablet 60 mg Nightly    oxyCODONE immediate release tablet 5 mg Q4H PRN    polyethylene glycol packet 17 g BID    senna-docusate 8.6-50 mg per tablet 1 tablet BID    sevelamer carbonate tablet 800 mg TID WM    sodium bicarbonate tablet 1,300 mg TID    traZODone tablet 50 mg Nightly PRN       Objective:     Vital Signs (Most Recent):  Temp: 98.5 °F (36.9 °C) (02/07/23 1202)  Pulse: 77 (02/07/23 1202)  Resp: 18 (02/07/23 1202)  BP: (!) 141/68 (02/07/23 1202)  SpO2: (!) 92 % (02/07/23 1202)   Vital Signs (24h Range):  Temp:  [98.1 °F (36.7 °C)-98.8 °F (37.1 °C)] 98.5 °F (36.9 °C)  Pulse:  [77-80] 77  Resp:  [16-18] 18  SpO2:  [87 %-96 %] 92 %  BP: (129-193)/(61-79) 141/68     Weight: 104.3 kg (230 lb) (02/06/23 1119)  Body mass index is 36.02 kg/m².  Body surface area is 2.22 meters  squared.    I/O last 3 completed shifts:  In: 720 [P.O.:720]  Out: 200 [Urine:200]    Physical Exam  Vitals and nursing note reviewed.   Constitutional:       Appearance: She is well-developed. She is obese. She is not diaphoretic.      Interventions: Nasal cannula in place.   HENT:      Head: Normocephalic and atraumatic.   Eyes:      General: No scleral icterus.     Conjunctiva/sclera: Conjunctivae normal.   Cardiovascular:      Rate and Rhythm: Normal rate and regular rhythm.      Heart sounds: No murmur heard.    No friction rub.   Pulmonary:      Effort: Pulmonary effort is normal. No respiratory distress.      Breath sounds: No stridor. No wheezing or rales.   Abdominal:      General: There is no distension.      Palpations: Abdomen is soft.      Tenderness: There is no abdominal tenderness. There is no guarding.   Musculoskeletal:         General: No signs of injury.      Comments: Left AV fistula site dressing in place   Skin:     General: Skin is warm and dry.      Coloration: Skin is not jaundiced or pale.   Neurological:      Mental Status: She is alert.      Cranial Nerves: No dysarthria.      Motor: No atrophy or seizure activity.   Psychiatric:         Attention and Perception: Attention normal.         Mood and Affect: Mood and affect normal.         Behavior: Behavior is cooperative.       Significant Labs:  CBC:   Recent Labs   Lab 02/07/23  0853   WBC 10.87   RBC 4.01   HGB 9.9*   HCT 31.7*      MCV 79*   MCH 24.7*   MCHC 31.2*     CMP:   Recent Labs   Lab 02/01/23  1302 02/03/23  0424 02/07/23  0853   GLU 85   < > 103   CALCIUM 7.7*   < > 8.7   ALBUMIN 3.4*   < > 3.2*   PROT 7.4  --   --       < > 138   K 5.6*   < > 4.1   CO2 18*   < > 24      < > 102   BUN 33*   < > 46*   CREATININE 4.4*   < > 4.0*   ALKPHOS 69  --   --    ALT 5*  --   --    AST 22  --   --    BILITOT 0.3  --   --     < > = values in this interval not displayed.     All labs within the past 24 hours have been  reviewed.     Significant Imaging:  Labs: Reviewed

## 2023-02-07 NOTE — PT/OT/SLP EVAL
"Occupational Therapy   Co-Evaluation and Treatment w PT  Co-treat with PT due to medical complexity of pt and need for skilled hands for safe intervention.  Name: Kathie Quezada  MRN: [id number]  Admitting Diagnosis:  Acute hypoxemic respiratory failure    Length of Stay: 6 days    Recommendations:     Discharge Recommendations: home  Discharge Equipment Recommendations:  walker, rolling  Barriers to discharge:  None    Plan:     Patient to be seen 3 x/week to address the above listed problems via self-care/home management, therapeutic activities, therapeutic exercises  Plan of Care Expires: 03/09/23  Plan of Care Reviewed with: patient    Assessment:     Kathie Quezada is a 62 y.o. female with a medical diagnosis of Acute hypoxemic respiratory failure.  She presents with the following performance deficits affecting function: weakness, impaired endurance, impaired self care skills, impaired functional mobility, gait instability, impaired balance, decreased lower extremity function, impaired cardiopulmonary response to activity.      Rehab Prognosis: Good; patient would benefit from acute skilled OT services to address these deficits and reach maximum level of function.       Subjective   Communicated with: RN prior to session.  Patient found HOB elevated with bed alarm, peripheral IV, telemetry upon OT entry to room.    Chief Complaint: Fatigue (Had L fistula placed yesterday, L arm now swollen and dressing site saturated in blood. )    Patient/Family Comments/goals: "my daughters and granddaughters all check in on me"     Pain/Comfort:  Pain Rating 1: 0/10  Pain Rating Post-Intervention 1: 0/10    Patients cultural, spiritual, Sabianist conflicts given the current situation: no    Occupational Profile:  Living Environment: pt lives alone, but has son who works as  who stays w her when in town. Home is  [facility], 1 step entrance, tubshower w shower chair available but not currently used. " "  Prior Level of Function: Patient reports being IND with mobility & with ADLs.   Patient uses DME as follows: shower chair (owns shower chair, not currently using).   DME owned (not currently used): none. Potentially owns RW, but unaware of its location  Roles/Repsonsibilities:   Hand Dominance: right   Work: no.   Drive: yes.   Managing Medicines/Managing Home: yes.   Hobbies: enjoys being independent and active.  Equipment Used at Home:  shower chair (owns shower chair, not currently using)    Patient reports they will have assistance from 3 daughters and son when in town upon discharge.      Objective:     Patient found with: bed alarm, peripheral IV, telemetry   General Precautions: Standard, Cardiac fall   Orthopedic Precautions:N/A   Braces: N/A   Respiratory Status:   Room air  Vitals: BP (!) 141/68 (BP Location: Right arm, Patient Position: Sitting)   Pulse 77   Temp 98.5 °F (36.9 °C) (Oral)   Resp 18   Ht 5' 7" (1.702 m)   Wt 104.3 kg (230 lb)   SpO2 (!) 92%   Breastfeeding No   BMI 36.02 kg/m²     Cognitive and Psychosocial Function:   AxOx4 --    Follows Commands/attention:follows one-step commands ,  brief delay in response  Communication:  clear/fluent and brief, minimal delay in response, requires increased time  Memory: No Deficits noted  Safety awareness/insight to disability: intact   Mood/Affect/Coping skills/emotional control: Appropriate to situation and Cooperative    Hearing: Intact    Vision:  Intact visual fields and wears glasses  but states she doesn't need them to ambulate within room    Physical Exam:  Postural examination/scapula alignment:    -       Rounded shoulders  -       Forward head  Skin integrity: Visible skin intact    BUE WFL for strength, sensation, GM/FM for use during functional tasks.  Pt is R handed.    Occupational Performance:  Bed Mobility:    Patient completed Supine to Sit with minimum assistance on L side of bed  Scooting anteriorly to EOB to have both " "feet planted on floor: stand by assistance    Functional Mobility/Transfers:  Static Sitting EOB: supervision  Dynamic Sitting EOB: supervision  Patient completed Sit <> Stand Transfer with contact guard assistance  with  no assistive device   Static Standing Balance: CGA no AD; improved to stand by assist w RW  Dynamic Standing Balance: CGA no AD, pt "furniture surfing"; improved to stand by assist w RW  Patient completed Bed <> Chair Transfer using Step Transfer technique with stand by assistance with rolling walker  Patient completed Toilet Transfer Step Transfer technique with contact guard assistance with  rolling walker and grab bars  Functional mobility: Pt ambulated bathroom distance with CGA w no AD. Improved to stand by assist w RW. Seated toileting and blu hygiene, standing ADLs, though pt declined additional mobility 2* fatigue.      Activities of Daily Living:  Feeding:  modified independence set up seated  Grooming: stand by assistance in standing at sink, pt endorsed fatigue w overhead activity and returned to chair to complete hair brushing  Upper Body Dressing: modified independence set up seated EOB  Lower Body Dressing: minimum assistance donning slip on sandals at EOB 2* tightness of strap, pt declined therapist loosening strap.   Toileting: modified independence seated at toilet level, seated blu hygiene       AMPAC 6 Click ADL:  AMPAC Total Score: 22    Treatment & Education:  -OT POC, safety during ADLs and mobility   -Education on energy conservation and task modification to maximize safety and independence  -Questions answered within OT scope of practice.      Patient left up in chair with all lines intact, call button in reach, and RN notified    GOALS:   Multidisciplinary Problems       Occupational Therapy Goals          Problem: Occupational Therapy    Goal Priority Disciplines Outcome Interventions   Occupational Therapy Goal     OT, PT/OT Ongoing, Progressing    Description: Goals " set on 2/7 with expiration date 2/21:  Patient will increase functional independence with ADLs by performing:    Supine <> Sit with [place]  Grooming while standing at sink with Mod [place].  UB Dressing with Mod [place].  LB Dressing with Mod [place].  Step transfer with Mod [place] with DME as needed.  Pt will demonstrate understanding of education provided regarding energy conservation and task modification through teach-back method.                          History:     Past Medical History:   Diagnosis Date    Acute respiratory failure with hypoxia and hypercapnia 05/26/2019    ATN (acute tubular necrosis) 02/08/2019    Chronic diastolic heart failure 02/07/2019 2-8-19 Mild left atrial enlargement. Mild left ventricular enlargement. Normal left ventricular systolic function. The estimated ejection fraction is 55% Indeterminate left ventricular diastolic function. Normal right ventricular systolic function. Mild mitral regurgitation. Mild to moderate tricuspid regurgitation. The estimated PA systolic pressure is 32 mm Hg Normal central venous pressure (3 m    CKD (chronic kidney disease) stage 4, GFR 15-29 ml/min 05/16/2018    CKD stage G3a/A3, GFR 45-59 and albumin creatinine ratio >300 mg/g 05/16/2018    CKD stage G3b/A3, GFR 30-44 and albumin creatinine ratio >300 mg/g 05/16/2018    Closed compression fracture of L2 lumbar vertebra 05/24/2019    Closed compression fracture of L2 lumbar vertebra 05/24/2019     IMO Regulatory Load October 2019    Controlled type 2 diabetes with retinopathy 04/09/2018    COVID-19 virus infection 7-1-2021 07/01/2021    CVA (cerebral vascular accident) 2017    Diabetic polyneuropathy associated with type 2 diabetes mellitus 12/04/2020    Encounter for blood transfusion     Essential hypertension 04/09/2018    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  7-31-19 Factor XI Activity 55 - 145 % 96       Gastroesophageal reflux disease  without esophagitis 05/24/2019    Generalized anxiety disorder 11/08/2019    GERD (gastroesophageal reflux disease)     GIB (gastrointestinal bleeding) 10/18/2018    History of CVA (cerebrovascular accident) without residual deficits 11/27/2022    History of hepatitis C     s/p succesful Rx w/ [facility]  - [id number] (cure) 2016    Hypertensive retinopathy, bilateral 12/10/2019    Mild nonproliferative diabetic retinopathy 05/02/2018    Mixed hyperlipidemia 04/09/2018    Myalgia due to statin 09/07/2022    Normocytic anemia 01/14/2022    NS (nuclear sclerosis), bilateral 12/10/2019    PNA (pneumonia) 05/28/2019    Postoperative hypothyroidism     Renovascular hypertension 04/09/2018    S/P kyphoplasty 08/14/2019 8/14/19: L2 kyphoplasty with Dr. Kwon     Slow transit constipation 05/27/2019    Type 2 diabetes mellitus with circulatory disorder, with long-term current use of insulin 04/09/2018    Type 2 diabetes mellitus with stage 3 chronic kidney disease, without long-term current use of insulin 04/09/2018    Type 2 diabetes mellitus with stage 3a chronic kidney disease, without long-term current use of insulin 03/03/2021    Type 2 diabetes mellitus with stage 3b chronic kidney disease, without long-term current use of insulin 09/07/2022         Past Surgical History:   Procedure Laterality Date    AV FISTULA PLACEMENT Left 10/27/2022    Procedure: CREATION, AV FISTULA;  Surgeon: Tirso Tavera MD;  Location: [facility] OR [address] [place] [address];  Service: Peripheral Vascular;  Laterality: Left;  Brachio basilic 1st stage     BACK SURGERY  2017    CHOLECYSTECTOMY  2015    [facility]    COLONOSCOPY  11/13/2015    Dr Chino torres prep. internal hemorrhoids. diverticulosis of sigmois. NO polyps. repeat due 2025    FISTULOGRAM N/A 12/16/2022    Procedure: Fistulogram;  Surgeon: Tirso Tavera MD;  Location: [facility] CATH LAB;  Service: Peripheral Vascular;  Laterality: N/A;    FIXATION KYPHOPLASTY Bilateral 08/14/2019    Procedure: L2  kyphoplasty Fluoro Globus;  Surgeon: Jeremie Kwon DO;  Location: [facility] OR;  Service: Neurosurgery;  Laterality: Bilateral;  CROW [place] [phone] TEXTED HER @ 10:54AM ON 7-30-19  PRE-OP-BY RN  8-7-2019    HYSTERECTOMY      OOPHORECTOMY  1996    one    PERCUTANEOUS TRANSLUMINAL ANGIOPLASTY OF ARTERIOVENOUS FISTULA Left 12/16/2022    Procedure: PTA, AV FISTULA;  Surgeon: Tirso Tavera MD;  Location: [facility] CATH LAB;  Service: Peripheral Vascular;  Laterality: Left;    THYROIDECTOMY  2016    at [facility] for thyroid nodule    TONSILLECTOMY      TRANSPOSITION OF BASILIC VEIN Left 1/31/2023    Procedure: TRANSPOSITION, VEIN, BASILIC;  Surgeon: Tirso Tavera MD;  Location: [address];  Service: Peripheral Vascular;  Laterality: Left;       Time Tracking:       OT Date of Treatment: 02/07/23  OT Start Time: 1023  OT Stop Time: 1045  OT Total Time (min): 22 min  Additional staff present: PT      Billable Minutes:Evaluation 10  Self Care/Home Management 12      2/7/2023

## 2023-02-07 NOTE — PLAN OF CARE
Problem: Adult Inpatient Plan of Care  Goal: Plan of Care Review  Outcome: Ongoing, Progressing  Goal: Patient-Specific Goal (Individualized)  Outcome: Ongoing, Progressing  Goal: Absence of Hospital-Acquired Illness or Injury  Outcome: Ongoing, Progressing  Goal: Optimal Comfort and Wellbeing  Outcome: Ongoing, Progressing  Goal: Readiness for Transition of Care  Outcome: Ongoing, Progressing     Problem: Diabetes Comorbidity  Goal: Blood Glucose Level Within Targeted Range  Outcome: Ongoing, Progressing     Problem: Fall Injury Risk  Goal: Absence of Fall and Fall-Related Injury  Outcome: Ongoing, Progressing     Problem: Gas Exchange Impaired  Goal: Optimal Gas Exchange  Outcome: Ongoing, Progressing

## 2023-02-07 NOTE — ASSESSMENT & PLAN NOTE
She reports symptoms indicative of this including chronic edema, nausea, confusion.   Has AV fistula but has not yet started dialysis. Continue sodium bicarbonate, calcitriol.  Consulted Nephrology:  - increased bicarb to 1300 TID, added sevelamer 800 TID  - home dose Lasix 40 PO BID  - changed amlodipine to nifedipine; resumed Coreg  - Can hold calcitriol while inpatient  Trial of diuresis for hypoxia; no indication for HD at this time.

## 2023-02-07 NOTE — ASSESSMENT & PLAN NOTE
Chest X-ray clear. Checked chest CT. No clear etiology found: just increased fluid and atelectasis.   Checked ventilation-perfusion scan to rule out pulmonary embolism; low prob.  Continue Lasix. Wean O2 as tolerated.  No improvement 2/5; Echo ordered. Check CVP, suspect volume overload.  Echo with CVP of 8 and PASP is 58 mmHg; Lasix changed to IV 80 mg BID on 2/6

## 2023-02-07 NOTE — ASSESSMENT & PLAN NOTE
63 yo F with CKD 5 2/2 to DM and poorly controlled HTN not yet started on HD with [facility]. Previous GN workup was negative. Unclear why she was taken off lisinopril in the past. Currently also undergoing transplant evaluation. Has AVF, s/p transposition. Would need line if starting HD. Reports some fatigue, but no emergent need for HD for electrolytes, clearance or volume status. Cr trending down from 4.6-->4.2. Creatinine 3.6 on admit, baseline around 3-3.6. Phos elevated at 5.5, bicarb 16    UPCr 1.37 (5/2019). Previously as high as 4.5.    (1/27/23)   UA: 2+ protein, 1+blood, 22 WBC  RP US: (11/17/22): medical renal disease    Lab Results   Component Value Date    CREATININE 4.0 (H) 02/07/2023       Plan:   - can DC bicarb to 1300 TID and sevelamer 800 TID  - continue lasix to 80 IV BID   - Significant amount of proteinuria seen previously, would recommend outpatient ACEi or ARB  - Can hold calcitriol while inpatient  - Strict I&Os and daily weights   - Daily BMP  - Trend sCr  - Avoid nephrotoxic agents such as NSAIDs, gadolinium, ACEi, ARBs and IV radiocontrast.  - Renally dose meds to current GFR.  - Maintain MAP > 65.  - No indications for HD at this time.   - Maintain electrolytes at goal: Mg > 2, Phos > 3, K > 4.

## 2023-02-07 NOTE — ASSESSMENT & PLAN NOTE
Vascular Surgery evaluated it since she had a procedure (baslic vein transposition) prior to developing shortness of breath and fever.  Doubt infection at this time; remains afebrile.

## 2023-02-07 NOTE — ASSESSMENT & PLAN NOTE
She takes amlodipine and carvedilol.   Changing amlodipine to nifedipine; added Coreg at lower dose, monitoring HR.  Increased Coreg 2/5; BP improving.

## 2023-02-07 NOTE — PLAN OF CARE
PT Evaluation completed and PT POC established.    Problem: Physical Therapy  Goal: Physical Therapy Goal  Description: Goals to be met by: 2023     Patient will increase functional independence with mobility by performin. Gait  x 150 feet with Supervision using LRAD.   2. Ascend/descend 1 stair with no Handrails Contact Guard Assistance using LRAD.     Outcome: Ongoing, Progressing

## 2023-02-07 NOTE — PLAN OF CARE
Problem: Adult Inpatient Plan of Care  Goal: Plan of Care Review  Outcome: Ongoing, Progressing     Problem: Diabetes Comorbidity  Goal: Blood Glucose Level Within Targeted Range  Outcome: Ongoing, Progressing     Problem: Fall Injury Risk  Goal: Absence of Fall and Fall-Related Injury  Outcome: Ongoing, Progressing     Problem: Gas Exchange Impaired  Goal: Optimal Gas Exchange  Outcome: Ongoing, Progressing    Pt AOx4. No acute events noted during shift. Vitals remained stable. No c/o pain or discomfort noted, scheduled meds given per MD orders. Q1-2hr safety checks completed. Bed remained low, locked, with all personal items in reach.

## 2023-02-07 NOTE — PROGRESS NOTES
[facility] ([place] [address])  Nephrology  Progress Note    Patient Name: Kathie Quezada  MRN: [id number]  Admission Date: 2/1/2023  Hospital Length of Stay: 6 days  Attending Provider: Arlene Paul MD   Primary Care Physician: Elan Price MD  Principal Problem:Acute hypoxemic respiratory failure    Subjective:     HPI:  Kathie Quezada 61 yo F with PMHX of obesity, hypertension, diabetes mellitus type 2 (A1c 5.2) with retinopathy and polyneuropathy, chronic diastolic heart failure (EF 65%), CKD V not yet initiated on HD, hx of stroke, s/p thyroidectomy in 2016 with postoperative hypothyroidism, 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 She was admitted 1 day after transposition of her AVF by vascular surgery on 1/31/23. She had AVF creation back in 10/27/2022. After the procedure, she felt SOB, developed fever of 101 and came to the ED. She reported lower extremity edema, memory changes, confusion, poor appetite, and nausea that had been going on for a long time prior to the procedure. Also noted to have leukocytosis with WBC count 13 which is now normal. She was treated for post op atelectasis which was seen on CT chest w/o contrast with incentive spirometry. VQ perfusion scan revealed low probability for PE. Blood cultures with NGTD, fever trending down, covid/flu/rsv negative.     Nephrology consulted for ARUNA on CKD5. She follows with  for nephrology, last seen in 1/27/23. Prior to that, followed with Dr. Kimball. Currently also undergoing transplant evaluation. Presumed to have CKD 2/2 to HTN and diabetes. She had received clindamycin for surgical prophylaxis, but no other abx. Baseline Cr appears to be around 3 to 3.6 with progression over the last few years to CKD V. Her creatinine trend has been  3.6-->4.4-->4.6--4.2 as of 2/4/22. Previous workup in 2018 revealed negative JB, ANCA, HIV, C3/C4. She is Hep C antibody + s/p treatment with Harvoni in 2016. Hep B core/surface Ab +, surface Ag negative. Previous kappa/lambda ration was 1.61, not diagnostic of myeloma in context of CKD. She previously had nephrotic range proteinuria around 4.5 (2018) that has since decreased to 1.37 (May 2019). PTH was 342 (1/27/23), increased from 283 before, for which she was started on calcitriol by Dr. Kelly. She states that she has not yet picked it up and started it prior to hospitalization. Unclear why her lisinopril was stopped. She reports fatigue and arm pain. Denies NSAID use, dysuria, hematuria. She reports poor BP control at home with BP running in 180s/90s. CXR revealed cardiomegaly but no pleural effusion. Significant labs include BUN 47/Cr 4.2, improving, bicarb 16, K wnl, phos 5.5.           Interval History: NAOE, +1unmeasured+500 cc on lasix 80 IV. Patient reports SOB with ambulation, on RA at rest. Cr stable at 4, BUN 46. Bicarb 24, phos 3.6. Echo shows CVP 8, pulmonary HTN.     Review of patient's allergies indicates:   Allergen Reactions    Atorvastatin Other (See Comments)     Statin induced myalgia    Mushroom Hives    Peanut Anaphylaxis    Bactrim [sulfamethoxazole-trimethoprim] Hives    Gabapentin      Pruritis     Iodine     Peanut butter flavor     Penicillins Hives    Shellfish containing products Itching    Sulfa (sulfonamide antibiotics) Hives     Current Facility-Administered Medications   Medication Frequency    acetaminophen tablet 650 mg Q6H PRN    carvediloL tablet 12.5 mg BID WM    cholecalciferol (vitamin D3) 125 mcg (5,000 unit) tablet 5,000 Units Daily    EScitalopram oxalate tablet 10 mg Daily    furosemide injection 80 mg BID loop    guaiFENesin 12 hr tablet 600 mg BID    hydrALAZINE injection 5 mg Q8H PRN    hydrOXYzine HCL tablet 25 mg TID PRN    levothyroxine  tablet 200 mcg Before breakfast    montelukast chewable tablet 5 mg QHS    mupirocin 2 % ointment BID    NIFEdipine 24 hr tablet 60 mg Nightly    oxyCODONE immediate release tablet 5 mg Q4H PRN    polyethylene glycol packet 17 g BID    senna-docusate 8.6-50 mg per tablet 1 tablet BID    sevelamer carbonate tablet 800 mg TID WM    sodium bicarbonate tablet 1,300 mg TID    traZODone tablet 50 mg Nightly PRN       Objective:     Vital Signs (Most Recent):  Temp: 98.5 °F (36.9 °C) (02/07/23 1202)  Pulse: 77 (02/07/23 1202)  Resp: 18 (02/07/23 1202)  BP: (!) 141/68 (02/07/23 1202)  SpO2: (!) 92 % (02/07/23 1202)   Vital Signs (24h Range):  Temp:  [98.1 °F (36.7 °C)-98.8 °F (37.1 °C)] 98.5 °F (36.9 °C)  Pulse:  [77-80] 77  Resp:  [16-18] 18  SpO2:  [87 %-96 %] 92 %  BP: (129-193)/(61-79) 141/68     Weight: 104.3 kg (230 lb) (02/06/23 1119)  Body mass index is 36.02 kg/m².  Body surface area is 2.22 meters squared.    I/O last 3 completed shifts:  In: 720 [P.O.:720]  Out: 200 [Urine:200]    Physical Exam  Vitals and nursing note reviewed.   Constitutional:       Appearance: She is well-developed. She is obese. She is not diaphoretic.      Interventions: Nasal cannula in place.   HENT:      Head: Normocephalic and atraumatic.   Eyes:      General: No scleral icterus.     Conjunctiva/sclera: Conjunctivae normal.   Cardiovascular:      Rate and Rhythm: Normal rate and regular rhythm.      Heart sounds: No murmur heard.    No friction rub.   Pulmonary:      Effort: Pulmonary effort is normal. No respiratory distress.      Breath sounds: No stridor. No wheezing or rales.   Abdominal:      General: There is no distension.      Palpations: Abdomen is soft.      Tenderness: There is no abdominal tenderness. There is no guarding.   Musculoskeletal:         General: No signs of injury.      Comments: Left AV fistula site dressing in place   Skin:     General: Skin is warm and dry.      Coloration: Skin is not jaundiced or  pale.   Neurological:      Mental Status: She is alert.      Cranial Nerves: No dysarthria.      Motor: No atrophy or seizure activity.   Psychiatric:         Attention and Perception: Attention normal.         Mood and Affect: Mood and affect normal.         Behavior: Behavior is cooperative.       Significant Labs:  CBC:   Recent Labs   Lab 02/07/23  0853   WBC 10.87   RBC 4.01   HGB 9.9*   HCT 31.7*      MCV 79*   MCH 24.7*   MCHC 31.2*     CMP:   Recent Labs   Lab 02/01/23  1302 02/03/23  0424 02/07/23  0853   GLU 85   < > 103   CALCIUM 7.7*   < > 8.7   ALBUMIN 3.4*   < > 3.2*   PROT 7.4  --   --       < > 138   K 5.6*   < > 4.1   CO2 18*   < > 24      < > 102   BUN 33*   < > 46*   CREATININE 4.4*   < > 4.0*   ALKPHOS 69  --   --    ALT 5*  --   --    AST 22  --   --    BILITOT 0.3  --   --     < > = values in this interval not displayed.     All labs within the past 24 hours have been reviewed.     Significant Imaging:  Labs: Reviewed    Assessment/Plan:     Chronic kidney disease (CKD), stage V  61 yo F with CKD 5 2/2 to DM and poorly controlled HTN not yet started on HD with ARUNA. Previous GN workup was negative. Unclear why she was taken off lisinopril in the past. Currently also undergoing transplant evaluation. Has AVF, s/p transposition. Would need line if starting HD. Reports some fatigue, but no emergent need for HD for electrolytes, clearance or volume status. Cr trending down from 4.6-->4.2. Creatinine 3.6 on admit, baseline around 3-3.6. Phos elevated at 5.5, bicarb 16    UPCr 1.37 (5/2019). Previously as high as 4.5.    (1/27/23)   UA: 2+ protein, 1+blood, 22 WBC  RP US: (11/17/22): medical renal disease    Lab Results   Component Value Date    CREATININE 4.0 (H) 02/07/2023       Plan:   - can DC bicarb to 1300 TID and sevelamer 800 TID  - continue lasix to 80 IV BID   - Significant amount of proteinuria seen previously, would recommend outpatient ACEi or ARB  - Can hold  calcitriol while inpatient  - Strict I&Os and daily weights   - Daily BMP  - Trend sCr  - Avoid nephrotoxic agents such as NSAIDs, gadolinium, ACEi, ARBs and IV radiocontrast.  - Renally dose meds to current GFR.  - Maintain MAP > 65.  - No indications for HD at this time.   - Maintain electrolytes at goal: Mg > 2, Phos > 3, K > 4.          Thank you for your consult. I will follow-up with patient. Please contact us if you have any additional questions.    Shama Olson MD  Nephrology  [facility] - Intensive Care ([facility]-)

## 2023-02-07 NOTE — ASSESSMENT & PLAN NOTE
Diet: Diet diabetic [facility]; 2000 Calorie; Renal; Fluid - 1500mL  Significant LDAs:   IV Access Type: Peripheral and Dialysis Access  Urinary Catheter Indication if present: Patient Does Not Have Urinary Catheter  Other Lines/Tubes/Drains:     Goals of Care:    Previous admission:  1/31/23  Code Status: Full Code  Likely prognosis:  Fair  Advance Care Planning   Date: 02/04/2023  Patient wishes to continue curative therapies and return to previous level of function.      LINDSEY: 2/8/2023     Code Status: Full Code   Is the patient medically ready for discharge?: No    Reason for patient still in hospital (select all that apply): Patient trending condition and Consult recommendations  Discharge Plan A: Home      High Risk Conditions:  Patient has a condition that poses threat to life and bodily function: Severe Respiratory Distress    The amount of time spent during, and associated with, this encounter was 50 minutes; the nature of the activities performed were:  Preparing to see the patient, chart review  Obtaining and/or receiving separately obtained history   Performing medically appropriate examination and evaluation  Counseling and educating the patient/family/caregiver  Ordering medications, tests, or procedures  Referring to and communicating with other health care professionals  Documenting clinical information in the EHR  Independently interpreting results  Care coordination

## 2023-02-07 NOTE — PLAN OF CARE
Problem: Occupational Therapy  Goal: Occupational Therapy Goal  Description: Goals set on 2/7 with expiration date 2/21:  Patient will increase functional independence with ADLs by performing:    Supine <> Sit with [place]  Grooming while standing at sink with Mod [place].  UB Dressing with Mod [place].  LB Dressing with Mod [place].  Step transfer with [place] with DME as needed.  Pt will demonstrate understanding of education provided regarding energy conservation and task modification through teach-back method.     Outcome: Ongoing, Progressing       Once medically stable, pt safe to dc home with no further OT needs anticipated.

## 2023-02-08 LAB
ALBUMIN SERPL BCP-MCNC: 3 G/DL (ref 3.5–5.2)
ANION GAP SERPL CALC-SCNC: 12 MMOL/L (ref 8–16)
BASOPHILS # BLD AUTO: 0.05 K/UL (ref 0–0.2)
BASOPHILS NFR BLD: 0.5 % (ref 0–1.9)
BUN SERPL-MCNC: 54 MG/DL (ref 8–23)
CALCIUM SERPL-MCNC: 8.6 MG/DL (ref 8.7–10.5)
CHLORIDE SERPL-SCNC: 100 MMOL/L (ref 95–110)
CO2 SERPL-SCNC: 28 MMOL/L (ref 23–29)
CREAT SERPL-MCNC: 4 MG/DL (ref 0.5–1.4)
DIFFERENTIAL METHOD: ABNORMAL
EOSINOPHIL # BLD AUTO: 0.3 K/UL (ref 0–0.5)
EOSINOPHIL NFR BLD: 2.4 % (ref 0–8)
ERYTHROCYTE [DISTWIDTH] IN BLOOD BY AUTOMATED COUNT: 14.1 % (ref 11.5–14.5)
EST. GFR  (NO RACE VARIABLE): 12.1 ML/MIN/1.73 M^2
GLUCOSE SERPL-MCNC: 109 MG/DL (ref 70–110)
HAV IGM SERPL QL IA: NORMAL
HBV CORE AB SERPL QL IA: REACTIVE
HBV SURFACE AB SER-ACNC: 80.86 MIU/ML
HBV SURFACE AB SER-ACNC: REACTIVE M[IU]/ML
HBV SURFACE AG SERPL QL IA: NORMAL
HCT VFR BLD AUTO: 28.7 % (ref 37–48.5)
HGB BLD-MCNC: 9 G/DL (ref 12–16)
IMM GRANULOCYTES # BLD AUTO: 0.05 K/UL (ref 0–0.04)
IMM GRANULOCYTES NFR BLD AUTO: 0.5 % (ref 0–0.5)
LYMPHOCYTES # BLD AUTO: 1.7 K/UL (ref 1–4.8)
LYMPHOCYTES NFR BLD: 15.7 % (ref 18–48)
MCH RBC QN AUTO: 24.8 PG (ref 27–31)
MCHC RBC AUTO-ENTMCNC: 31.4 G/DL (ref 32–36)
MCV RBC AUTO: 79 FL (ref 82–98)
MONOCYTES # BLD AUTO: 1 K/UL (ref 0.3–1)
MONOCYTES NFR BLD: 9 % (ref 4–15)
NEUTROPHILS # BLD AUTO: 7.8 K/UL (ref 1.8–7.7)
NEUTROPHILS NFR BLD: 71.9 % (ref 38–73)
NRBC BLD-RTO: 0 /100 WBC
PHOSPHATE SERPL-MCNC: 3.4 MG/DL (ref 2.7–4.5)
PLATELET # BLD AUTO: 173 K/UL (ref 150–450)
PMV BLD AUTO: 11.4 FL (ref 9.2–12.9)
POTASSIUM SERPL-SCNC: 3.9 MMOL/L (ref 3.5–5.1)
RBC # BLD AUTO: 3.63 M/UL (ref 4–5.4)
SODIUM SERPL-SCNC: 140 MMOL/L (ref 136–145)
WBC # BLD AUTO: 10.8 K/UL (ref 3.9–12.7)

## 2023-02-08 PROCEDURE — 80069 RENAL FUNCTION PANEL: CPT | Mod: HCNC,NTX | Performed by: INTERNAL MEDICINE

## 2023-02-08 PROCEDURE — 85025 COMPLETE CBC W/AUTO DIFF WBC: CPT | Mod: HCNC,NTX | Performed by: INTERNAL MEDICINE

## 2023-02-08 PROCEDURE — 25000003 PHARM REV CODE 250: Mod: HCNC,NTX | Performed by: INTERNAL MEDICINE

## 2023-02-08 PROCEDURE — 25000003 PHARM REV CODE 250: Mod: HCNC,NTX | Performed by: HOSPITALIST

## 2023-02-08 PROCEDURE — 99233 PR SUBSEQUENT HOSPITAL CARE,LEVL III: ICD-10-PCS | Mod: HCNC,NTX,, | Performed by: INTERNAL MEDICINE

## 2023-02-08 PROCEDURE — 99233 SBSQ HOSP IP/OBS HIGH 50: CPT | Mod: HCNC,NTX,, | Performed by: INTERNAL MEDICINE

## 2023-02-08 PROCEDURE — 99233 SBSQ HOSP IP/OBS HIGH 50: CPT | Mod: HCNC,95,NTX, | Performed by: INTERNAL MEDICINE

## 2023-02-08 PROCEDURE — 36415 COLL VENOUS BLD VENIPUNCTURE: CPT | Mod: HCNC,NTX | Performed by: INTERNAL MEDICINE

## 2023-02-08 PROCEDURE — 94761 N-INVAS EAR/PLS OXIMETRY MLT: CPT | Mod: HCNC,NTX

## 2023-02-08 PROCEDURE — 12000002 HC ACUTE/MED SURGE SEMI-PRIVATE ROOM: Mod: HCNC,NTX

## 2023-02-08 PROCEDURE — 99233 PR SUBSEQUENT HOSPITAL CARE,LEVL III: ICD-10-PCS | Mod: HCNC,95,NTX, | Performed by: INTERNAL MEDICINE

## 2023-02-08 PROCEDURE — 63600175 PHARM REV CODE 636 W HCPCS: Mod: HCNC,NTX | Performed by: INTERNAL MEDICINE

## 2023-02-08 RX ORDER — FUROSEMIDE 20 MG/1
80 TABLET ORAL 2 TIMES DAILY
Status: DISCONTINUED | OUTPATIENT
Start: 2023-02-08 | End: 2023-02-10 | Stop reason: HOSPADM

## 2023-02-08 RX ORDER — FUROSEMIDE 40 MG/1
80 TABLET ORAL 2 TIMES DAILY
Qty: 180 TABLET | Refills: 3 | Status: SHIPPED | OUTPATIENT
Start: 2023-02-08 | End: 2023-02-10 | Stop reason: SDUPTHER

## 2023-02-08 RX ADMIN — SEVELAMER CARBONATE 800 MG: 800 TABLET, FILM COATED ORAL at 08:02

## 2023-02-08 RX ADMIN — GUAIFENESIN 600 MG: 600 TABLET, EXTENDED RELEASE ORAL at 08:02

## 2023-02-08 RX ADMIN — OXYCODONE HYDROCHLORIDE 5 MG: 5 TABLET ORAL at 02:02

## 2023-02-08 RX ADMIN — FUROSEMIDE 80 MG: 10 INJECTION, SOLUTION INTRAMUSCULAR; INTRAVENOUS at 09:02

## 2023-02-08 RX ADMIN — ESCITALOPRAM OXALATE 10 MG: 10 TABLET ORAL at 08:02

## 2023-02-08 RX ADMIN — MUPIROCIN: 20 OINTMENT TOPICAL at 08:02

## 2023-02-08 RX ADMIN — SODIUM BICARBONATE 1300 MG: 650 TABLET ORAL at 08:02

## 2023-02-08 RX ADMIN — CARVEDILOL 12.5 MG: 12.5 TABLET, FILM COATED ORAL at 08:02

## 2023-02-08 RX ADMIN — LEVOTHYROXINE SODIUM 200 MCG: 100 TABLET ORAL at 06:02

## 2023-02-08 RX ADMIN — NIFEDIPINE 60 MG: 30 TABLET, FILM COATED, EXTENDED RELEASE ORAL at 08:02

## 2023-02-08 RX ADMIN — OXYCODONE HYDROCHLORIDE 5 MG: 5 TABLET ORAL at 08:02

## 2023-02-08 RX ADMIN — TRAZODONE HYDROCHLORIDE 50 MG: 50 TABLET ORAL at 11:02

## 2023-02-08 RX ADMIN — FUROSEMIDE 80 MG: 20 TABLET ORAL at 05:02

## 2023-02-08 RX ADMIN — MICONAZOLE NITRATE: 20 CREAM TOPICAL at 09:02

## 2023-02-08 RX ADMIN — CARVEDILOL 12.5 MG: 12.5 TABLET, FILM COATED ORAL at 04:02

## 2023-02-08 RX ADMIN — POLYETHYLENE GLYCOL 3350 17 G: 17 POWDER, FOR SOLUTION ORAL at 08:02

## 2023-02-08 RX ADMIN — SENNOSIDES AND DOCUSATE SODIUM 1 TABLET: 50; 8.6 TABLET ORAL at 08:02

## 2023-02-08 RX ADMIN — MICONAZOLE NITRATE: 20 CREAM TOPICAL at 10:02

## 2023-02-08 RX ADMIN — CHOLECALCIFEROL TAB 125 MCG (5000 UNIT) 5000 UNITS: 125 TAB at 08:02

## 2023-02-08 RX ADMIN — MONTELUKAST SODIUM 5 MG: 5 TABLET, CHEWABLE ORAL at 08:02

## 2023-02-08 NOTE — PLAN OF CARE
Problem: Adult Inpatient Plan of Care  Goal: Plan of Care Review  Outcome: Ongoing, Progressing     Problem: Diabetes Comorbidity  Goal: Blood Glucose Level Within Targeted Range  Outcome: Ongoing, Progressing     Problem: Fall Injury Risk  Goal: Absence of Fall and Fall-Related Injury  Outcome: Ongoing, Progressing     Problem: Gas Exchange Impaired  Goal: Optimal Gas Exchange  Outcome: Ongoing, Progressing     Pt AOx4. No acute events noted during shift. Vitals remained stable. Had c/o pain and discomfort, prn meds given per MD orders. Q1-2hr safety checks completed. Bed remained low, locked, with all personal items in reach.

## 2023-02-08 NOTE — PROGRESS NOTES
[facility] ([place] [address])  Nephrology  Progress Note    Patient Name: Kathie Quezada  MRN: [id number]  Admission Date: 2/1/2023  Hospital Length of Stay: 7 days  Attending Provider: Arlene Paul MD   Primary Care Physician: Elan Price MD  Principal Problem:Acute hypoxemic respiratory failure    Subjective:     HPI:  Kathie Quezada 63 yo F with PMHX of obesity, hypertension, diabetes mellitus type 2 (A1c 5.2) with retinopathy and polyneuropathy, chronic diastolic heart failure (EF 65%), CKD V not yet initiated on HD, hx of stroke, s/p thyroidectomy in 2016 with postoperative hypothyroidism, 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 She was admitted 1 day after transposition of her AVF by vascular surgery on 1/31/23. She had AVF creation back in 10/27/2022. After the procedure, she felt SOB, developed fever of 101 and came to the ED. She reported lower extremity edema, memory changes, confusion, poor appetite, and nausea that had been going on for a long time prior to the procedure. Also noted to have leukocytosis with WBC count 13 which is now normal. She was treated for post op atelectasis which was seen on CT chest w/o contrast with incentive spirometry. VQ perfusion scan revealed low probability for PE. Blood cultures with NGTD, fever trending down, covid/flu/rsv negative.     Nephrology consulted for ARUNA on CKD5. She follows with  for nephrology, last seen in 1/27/23. Prior to that, followed with Dr. Kimball. Currently also undergoing transplant evaluation. Presumed to have CKD 2/2 to HTN and diabetes. She had received clindamycin for surgical prophylaxis, but no other abx. Baseline Cr appears to be around 3 to 3.6 with progression over the last few years to CKD V. Her creatinine trend has been  3.6-->4.4-->4.6--4.2 as of 2/4/22. Previous workup in 2018 revealed negative JB, ANCA, HIV, C3/C4. She is Hep C antibody + s/p treatment with Harvoni in 2016. Hep B core/surface Ab +, surface Ag negative. Previous kappa/lambda ration was 1.61, not diagnostic of myeloma in context of CKD. She previously had nephrotic range proteinuria around 4.5 (2018) that has since decreased to 1.37 (May 2019). PTH was 342 (1/27/23), increased from 283 before, for which she was started on calcitriol by Dr. Kelly. She states that she has not yet picked it up and started it prior to hospitalization. Unclear why her lisinopril was stopped. She reports fatigue and arm pain. Denies NSAID use, dysuria, hematuria. She reports poor BP control at home with BP running in 180s/90s. CXR revealed cardiomegaly but no pleural effusion. Significant labs include BUN 47/Cr 4.2, improving, bicarb 16, K wnl, phos 5.5.           Interval History: NAOE, lungs sounds clear, no LE edema. Did better on second walk test.     Review of patient's allergies indicates:   Allergen Reactions    Atorvastatin Other (See Comments)     Statin induced myalgia    Mushroom Hives    Peanut Anaphylaxis    Bactrim [sulfamethoxazole-trimethoprim] Hives    Gabapentin      Pruritis     Iodine     Peanut butter flavor     Penicillins Hives    Shellfish containing products Itching    Sulfa (sulfonamide antibiotics) Hives     Current Facility-Administered Medications   Medication Frequency    acetaminophen tablet 650 mg Q6H PRN    carvediloL tablet 12.5 mg BID WM    cholecalciferol (vitamin D3) 125 mcg (5,000 unit) tablet 5,000 Units Daily    diphenhydrAMINE capsule 25 mg Q6H PRN    EScitalopram oxalate tablet 10 mg Daily    furosemide tablet 80 mg BID    guaiFENesin 12 hr tablet 600 mg BID    hydrALAZINE injection 5 mg Q8H PRN    levothyroxine tablet 200 mcg Before breakfast    miconazole 2 % cream BID    montelukast chewable tablet 5 mg QHS     mupirocin 2 % ointment BID    NIFEdipine 24 hr tablet 60 mg Nightly    oxyCODONE immediate release tablet 5 mg Q4H PRN    polyethylene glycol packet 17 g BID    senna-docusate 8.6-50 mg per tablet 1 tablet BID    traZODone tablet 50 mg Nightly PRN       Objective:     Vital Signs (Most Recent):  Temp: 98.3 °F (36.8 °C) (02/08/23 1112)  Pulse: 74 (02/08/23 1112)  Resp: 18 (02/08/23 1112)  BP: 134/81 (02/08/23 1112)  SpO2: (!) 91 % (02/08/23 1112)   Vital Signs (24h Range):  Temp:  [98.2 °F (36.8 °C)-98.5 °F (36.9 °C)] 98.3 °F (36.8 °C)  Pulse:  [74-84] 74  Resp:  [16-18] 18  SpO2:  [90 %-98 %] 91 %  BP: (123-157)/(60-81) 134/81     Weight: 104.3 kg (230 lb) (02/06/23 1119)  Body mass index is 36.02 kg/m².  Body surface area is 2.22 meters squared.    I/O last 3 completed shifts:  In: 720 [P.O.:720]  Out: 800 [Urine:800]    Physical Exam  Vitals and nursing note reviewed.   Constitutional:       Appearance: She is well-developed. She is obese. She is not diaphoretic.      Interventions: Nasal cannula in place.   HENT:      Head: Normocephalic and atraumatic.   Eyes:      General: No scleral icterus.     Conjunctiva/sclera: Conjunctivae normal.   Cardiovascular:      Rate and Rhythm: Normal rate and regular rhythm.      Heart sounds: No murmur heard.    No friction rub.   Pulmonary:      Effort: Pulmonary effort is normal. No respiratory distress.      Breath sounds: No stridor. No wheezing or rales.   Abdominal:      General: There is no distension.      Palpations: Abdomen is soft.      Tenderness: There is no abdominal tenderness. There is no guarding.   Musculoskeletal:         General: No signs of injury.      Comments: Left AV fistula site dressing in place   Skin:     General: Skin is warm and dry.      Coloration: Skin is not jaundiced or pale.   Neurological:      Mental Status: She is alert.      Cranial Nerves: No dysarthria.      Motor: No atrophy or seizure activity.   Psychiatric:         Attention  and Perception: Attention normal.         Mood and Affect: Mood and affect normal.         Behavior: Behavior is cooperative.       Significant Labs:  CBC:   Recent Labs   Lab 02/08/23  0250   WBC 10.80   RBC 3.63*   HGB 9.0*   HCT 28.7*      MCV 79*   MCH 24.8*   MCHC 31.4*     CMP:   Recent Labs   Lab 02/01/23  1302 02/03/23  0424 02/08/23  0250   GLU 85   < > 109   CALCIUM 7.7*   < > 8.6*   ALBUMIN 3.4*   < > 3.0*   PROT 7.4  --   --       < > 140   K 5.6*   < > 3.9   CO2 18*   < > 28      < > 100   BUN 33*   < > 54*   CREATININE 4.4*   < > 4.0*   ALKPHOS 69  --   --    ALT 5*  --   --    AST 22  --   --    BILITOT 0.3  --   --     < > = values in this interval not displayed.     All labs within the past 24 hours have been reviewed.     Significant Imaging:  Labs: Reviewed    Assessment/Plan:     Chronic kidney disease (CKD), stage V  61 yo F with CKD 5 2/2 to DM and poorly controlled HTN not yet started on HD with ARUNA. Previous GN workup was negative. Unclear why she was taken off lisinopril in the past. Currently also undergoing transplant evaluation. Has AVF, s/p transposition. Would need line if starting HD. Reports some fatigue, but no emergent need for HD for electrolytes, clearance or volume status. Cr trending down from 4.6-->4.2. Creatinine 3.6 on admit, baseline around 3-3.6. Phos elevated at 5.5, bicarb 16    UPCr 1.37 (5/2019). Previously as high as 4.5.    (1/27/23)   UA: 2+ protein, 1+blood, 22 WBC  RP US: (11/17/22): medical renal disease    Lab Results   Component Value Date    CREATININE 4.0 (H) 02/08/2023       Plan:   - Stable Cr, Appears close to euvolemic. Can switch to lasix to 80 PO BID  - Significant amount of proteinuria seen previously, would recommend outpatient ACEi or ARB  - Can hold calcitriol while inpatient  - Strict I&Os and daily weights   - Daily BMP  - Trend sCr  - Avoid nephrotoxic agents such as NSAIDs, gadolinium, ACEi, ARBs and IV radiocontrast.  -  Renally dose meds to current GFR.  - Maintain MAP > 65.  - No indications for HD at this time.   - Maintain electrolytes at goal: Mg > 2, Phos > 3, K > 4.      Renovascular hypertension  bp improved, still sometimes hyptertensive.    - on coreg 12.5 BID and nifedipine 60        Thank you for your consult. I will sign off. Please contact us if you have any additional questions.    Shama Olson MD  Nephrology  [facility] - Intensive [facility] ([place])

## 2023-02-08 NOTE — SUBJECTIVE & OBJECTIVE
Interval History: NAOE, lungs sounds clear, no LE edema. Did better on second walk test.     Review of patient's allergies indicates:   Allergen Reactions    Atorvastatin Other (See Comments)     Statin induced myalgia    Mushroom Hives    Peanut Anaphylaxis    Bactrim [sulfamethoxazole-trimethoprim] Hives    Gabapentin      Pruritis     Iodine     Peanut butter flavor     Penicillins Hives    Shellfish containing products Itching    Sulfa (sulfonamide antibiotics) Hives     Current Facility-Administered Medications   Medication Frequency    acetaminophen tablet 650 mg Q6H PRN    carvediloL tablet 12.5 mg BID WM    cholecalciferol (vitamin D3) 125 mcg (5,000 unit) tablet 5,000 Units Daily    diphenhydrAMINE capsule 25 mg Q6H PRN    EScitalopram oxalate tablet 10 mg Daily    furosemide tablet 80 mg BID    guaiFENesin 12 hr tablet 600 mg BID    hydrALAZINE injection 5 mg Q8H PRN    levothyroxine tablet 200 mcg Before breakfast    miconazole 2 % cream BID    montelukast chewable tablet 5 mg QHS    mupirocin 2 % ointment BID    NIFEdipine 24 hr tablet 60 mg Nightly    oxyCODONE immediate release tablet 5 mg Q4H PRN    polyethylene glycol packet 17 g BID    senna-docusate 8.6-50 mg per tablet 1 tablet BID    traZODone tablet 50 mg Nightly PRN       Objective:     Vital Signs (Most Recent):  Temp: 98.3 °F (36.8 °C) (02/08/23 1112)  Pulse: 74 (02/08/23 1112)  Resp: 18 (02/08/23 1112)  BP: 134/81 (02/08/23 1112)  SpO2: (!) 91 % (02/08/23 1112)   Vital Signs (24h Range):  Temp:  [98.2 °F (36.8 °C)-98.5 °F (36.9 °C)] 98.3 °F (36.8 °C)  Pulse:  [74-84] 74  Resp:  [16-18] 18  SpO2:  [90 %-98 %] 91 %  BP: (123-157)/(60-81) 134/81     Weight: 104.3 kg (230 lb) (02/06/23 1119)  Body mass index is 36.02 kg/m².  Body surface area is 2.22 meters squared.    I/O last 3 completed shifts:  In: 720 [P.O.:720]  Out: 800 [Urine:800]    Physical Exam  Vitals and nursing note reviewed.   Constitutional:       Appearance: She is  well-developed. She is obese. She is not diaphoretic.      Interventions: Nasal cannula in place.   HENT:      Head: Normocephalic and atraumatic.   Eyes:      General: No scleral icterus.     Conjunctiva/sclera: Conjunctivae normal.   Cardiovascular:      Rate and Rhythm: Normal rate and regular rhythm.      Heart sounds: No murmur heard.    No friction rub.   Pulmonary:      Effort: Pulmonary effort is normal. No respiratory distress.      Breath sounds: No stridor. No wheezing or rales.   Abdominal:      General: There is no distension.      Palpations: Abdomen is soft.      Tenderness: There is no abdominal tenderness. There is no guarding.   Musculoskeletal:         General: No signs of injury.      Comments: Left AV fistula site dressing in place   Skin:     General: Skin is warm and dry.      Coloration: Skin is not jaundiced or pale.   Neurological:      Mental Status: She is alert.      Cranial Nerves: No dysarthria.      Motor: No atrophy or seizure activity.   Psychiatric:         Attention and Perception: Attention normal.         Mood and Affect: Mood and affect normal.         Behavior: Behavior is cooperative.       Significant Labs:  CBC:   Recent Labs   Lab 02/08/23  0250   WBC 10.80   RBC 3.63*   HGB 9.0*   HCT 28.7*      MCV 79*   MCH 24.8*   MCHC 31.4*     CMP:   Recent Labs   Lab 02/01/23  1302 02/03/23  0424 02/08/23  0250   GLU 85   < > 109   CALCIUM 7.7*   < > 8.6*   ALBUMIN 3.4*   < > 3.0*   PROT 7.4  --   --       < > 140   K 5.6*   < > 3.9   CO2 18*   < > 28      < > 100   BUN 33*   < > 54*   CREATININE 4.4*   < > 4.0*   ALKPHOS 69  --   --    ALT 5*  --   --    AST 22  --   --    BILITOT 0.3  --   --     < > = values in this interval not displayed.     All labs within the past 24 hours have been reviewed.     Significant Imaging:  Labs: Reviewed

## 2023-02-08 NOTE — SUBJECTIVE & OBJECTIVE
Interval History: [facility] follow up visit for suspected Shortness of breath [R06.02]  Hypoxia [R09.02]  Fever [R50.9] present on admission.   This service was provided by telemedicine.    Patient was transferred to [facility] on: 2/4/2023  The patient location is: [id number]   Admitted 2/1/2023 12:25 PM    The patient is able to provide adequate history. Additional history was obtained from: chart review.  No significant events overnight reported.  Patient reports complaints of weakness. Comfortable on RA all day. SpO2 91% with ambulation now. Diuretics discussed with Nephrology  Symptoms are decreasing in severity since yesterday.     I have reviewed the following on 2/8/2023:     Details     [x]   Lab results  H&H stable; sCr stable    []   Micro reports     []   Pathology reports     []   Imaging reports     []   Cardiology Procedure reports     []   Outside records/CareEverywhere     []  Independently viewed:       Review of Systems   Constitutional:  Negative for fever.   Respiratory:  Negative for shortness of breath.      Objective:     Vital Signs (Most Recent):  Temp: 98.2 °F (36.8 °C) (02/08/23 1553)  Pulse: 81 (02/08/23 1553)  Resp: 18 (02/08/23 1553)  BP: (!) 145/62 (02/08/23 1553)  SpO2: (!) 90 % (02/08/23 1553)   Vital Signs (24h Range):  Temp:  [98.2 °F (36.8 °C)-98.5 °F (36.9 °C)] 98.2 °F (36.8 °C)  Pulse:  [74-84] 81  Resp:  [16-18] 18  SpO2:  [90 %-93 %] 90 %  BP: (123-157)/(60-81) 145/62     Weight: 104.3 kg (230 lb)  Body mass index is 36.02 kg/m².    Intake/Output Summary (Last 24 hours) at 2/8/2023 1559  Last data filed at 2/8/2023 1300  Gross per 24 hour   Intake 960 ml   Output 300 ml   Net 660 ml        Physical Exam  Constitutional:       Appearance: She is obese.   Cardiovascular:      Comments: Monitor and/or Vital signs reviewed at time of visit  Pulmonary:      Effort: Pulmonary effort is normal. No accessory muscle usage or respiratory distress.   Neurological:       Mental Status: She is alert. She is not disoriented.   Psychiatric:         Attention and Perception: Attention normal.         Mood and Affect: Affect normal.         Behavior: Behavior is cooperative.         Cognition and Memory: Memory is impaired.       Significant Labs:   Recent Labs   Lab 09/07/22  1047 11/17/22  0813 02/02/23  2130   HGBA1C 6.8* 5.5 5.2       Recent Labs   Lab 02/06/23  0431 02/07/23  0853 02/08/23  0250   WBC 9.57 10.87 10.80   HGB 8.4* 9.9* 9.0*   HCT 27.6* 31.7* 28.7*    197 173       Recent Labs   Lab 02/04/23  0556 02/06/23  0431 02/08/23  0250   GRAN 74.5*  8.9* 71.3  6.8 71.9  7.8*   LYMPH 14.4*  1.7 16.8*  1.6 15.7*  1.7   MONO 8.7  1.0 8.7  0.8 9.0  1.0   EOS 0.1 0.2 0.3       Recent Labs   Lab 02/04/23  0556 02/05/23  1001 02/06/23  0431 02/07/23  0853 02/08/23  0250   *   < > 134* 138 140   K 4.4   < > 4.1 4.1 3.9      < > 101 102 100   CO2 16*   < > 22* 24 28   BUN 47*   < > 52* 46* 54*   CREATININE 4.2*   < > 4.0* 4.0* 4.0*   GLU 81   < > 98 103 109   CALCIUM 8.2*   < > 8.4* 8.7 8.6*   ALBUMIN 3.1*   < > 3.0* 3.2* 3.0*   MG 1.8  --   --   --   --    PHOS 5.5*   < > 4.4 3.6 3.4    < > = values in this interval not displayed.       Recent Labs   Lab 12/19/22  1031 01/27/23  1004   FERRITIN 108 61       Results for orders placed or performed in visit on 01/27/23   Vitamin D   Result Value Ref Range    Vit D, 25-Hydroxy 43 30 - 96 ng/mL     SARS-CoV2 (COVID-19) Qualitative PCR (no units)   Date Value   02/04/2023 Not Detected   02/01/2023 Not Detected     SARS-CoV-2 RNA, Amplification, Qual (no units)   Date Value   10/23/2020 Negative     POC Rapid COVID (no units)   Date Value   07/01/2021 Positive (A)       ECG Results              EKG 12-lead (Final result)  Result time 02/01/23 15:13:56      Final result by Interface, Lab In [facility] (02/01/23 15:13:56)                   Narrative:    Test Reason : R06.02,    Vent. Rate : 076 BPM     Atrial Rate : 076  BPM     P-R Int : 196 ms          QRS Dur : 092 ms      QT Int : 394 ms       P-R-T Axes : 051 044 042 degrees     QTc Int : 443 ms    Normal sinus rhythm  Low anterior forces  Abnormal ECG  When compared with ECG of 31-JAN-2023 13:45,  No significant change was found  Confirmed by Mark TREJO MD ([id number]) on 2/1/2023 3:13:50 PM    Referred By: [email or web address]   SELF           Confirmed By:Mark TREJO MD                                    Results for orders placed during the hospital encounter of 09/11/22    Echo    Interpretation Summary  · Severe left atrial enlargement.  · The left ventricle is normal in size with mild eccentric hypertrophy and normal systolic function.  · The estimated ejection fraction is 65%.  · Indeterminate left ventricular diastolic function.  · Normal right ventricular size with normal right ventricular systolic function.  · Mild mitral regurgitation.  · Normal central venous pressure (3 mmHg).      Echo  · Moderate left atrial enlargement.  · The left ventricle is normal in size with concentric hypertrophy and   normal systolic function.  · Left ventricular diastolic dysfunction.  · Normal right ventricular size with normal right ventricular systolic   function.  · Mild mitral regurgitation.  · Moderate tricuspid regurgitation.  · Intermediate central venous pressure (8 mmHg).  · There is pulmonary hypertension. The estimated PA systolic pressure is   58 mmHg.  · Small lateral pericardial effusion.  · The estimated PA systolic pressure is 58 mmHg.         Labs and Imaging within the last 24 hours listed above were reviewed.

## 2023-02-08 NOTE — ASSESSMENT & PLAN NOTE
63 yo F with CKD 5 2/2 to DM and poorly controlled HTN not yet started on HD with ARUNA. Previous GN workup was negative. Unclear why she was taken off lisinopril in the past. Currently also undergoing transplant evaluation. Has AVF, s/p transposition. Would need line if starting HD. Reports some fatigue, but no emergent need for HD for electrolytes, clearance or volume status. Cr trending down from 4.6-->4.2. Creatinine 3.6 on admit, baseline around 3-3.6. Phos elevated at 5.5, bicarb 16    UPCr 1.37 (5/2019). Previously as high as 4.5.    (1/27/23)   UA: 2+ protein, 1+blood, 22 WBC  RP US: (11/17/22): medical renal disease    Lab Results   Component Value Date    CREATININE 4.0 (H) 02/08/2023       Plan:   - Stable Cr, Appears close to euvolemic. Can switch to lasix to 80 PO BID  - Significant amount of proteinuria seen previously, would recommend outpatient ACEi or ARB  - Can hold calcitriol while inpatient  - Strict I&Os and daily weights   - Daily BMP  - Trend sCr  - Avoid nephrotoxic agents such as NSAIDs, gadolinium, ACEi, ARBs and IV radiocontrast.  - Renally dose meds to current GFR.  - Maintain MAP > 65.  - No indications for HD at this time.   - Maintain electrolytes at goal: Mg > 2, Phos > 3, K > 4.

## 2023-02-08 NOTE — ASSESSMENT & PLAN NOTE
Chest X-ray clear. Checked chest CT. No clear etiology found: just increased fluid and atelectasis.   Checked ventilation-perfusion scan to rule out pulmonary embolism; low prob.  Continue Lasix. Wean O2 as tolerated.  No improvement 2/5; Echo ordered. Check CVP, suspect volume overload.  Echo with CVP of 8 and PASP is 58 mmHg; Lasix changed to IV 80 mg BID on 2/6  Improved at rest; needs repeat walk test

## 2023-02-08 NOTE — NURSING
Home Oxygen Evaluation    Date Performed: 2023    1) Patient's Home O2 Sat on room air, while at rest: 98%        If O2 sats on room air at rest are 88% or below, patient qualifies. No additional testing needed. Document N/A in steps 2 and 3. If 89% or above, complete steps 2.      2) Patient's O2 Sat on room air while exercisin%        If O2 sats on room air while exercising remain 89% or above patient does not qualify, no further testing needed Document N/A in step 3. If O2 sats on room air while exercising are 88% or below, continue to step 3.      3) Patient's O2 Sat while exercising on O2: 91% at 0 LPM         (Must show improvement from #2 for patients to qualify)    If O2 sats improve on oxygen, patient qualifies for portable oxygen. If not, the patient does not qualify.

## 2023-02-08 NOTE — PLAN OF CARE
02/08/23 0951   Post-Acute Status   Post-Acute Authorization Other   Other Status Community Services       SW placed Ambulatory referral/consult to [facility] -High Risk of Readmit        Terri Sullivan LMSW  PRN - [facility]  [phone]

## 2023-02-08 NOTE — ASSESSMENT & PLAN NOTE
Continued home furosemide.  Changed Lasix to 80 mg IV BID on 2/6  Continuing current management with IV Lasix BID; walk test ordered

## 2023-02-08 NOTE — PLAN OF CARE
Problem: Adult Inpatient Plan of Care  Goal: Plan of Care Review  Outcome: Ongoing, Progressing  Goal: Patient-Specific Goal (Individualized)  Outcome: Ongoing, Progressing  Goal: Absence of Hospital-Acquired Illness or Injury  Outcome: Ongoing, Progressing  Goal: Optimal Comfort and Wellbeing  Outcome: Ongoing, Progressing  Goal: Readiness for Transition of Care  Outcome: Ongoing, Progressing     Problem: Diabetes Comorbidity  Goal: Blood Glucose Level Within Targeted Range  Outcome: Ongoing, Progressing     Problem: Fall Injury Risk  Goal: Absence of Fall and Fall-Related Injury  Outcome: Ongoing, Progressing     Problem: Gas Exchange Impaired  Goal: Optimal Gas Exchange  Outcome: Ongoing, Progressing    Patient alert and oriented. Cooperative with care. Patient ordered to have 6 min walk test. Attempted to do test but patient refused at this time. Will continue to attempt test.

## 2023-02-08 NOTE — PLAN OF CARE
02/08/23 1046   Post-Acute Status   Post-Acute Authorization Other   Other Status Community Services   Discharge Plan   Discharge Plan A Home       SW placed referral for Outpatient Case management upon d/c.  SW will follow.         Terri Sullivan LMSW  PRN - [facility]  [phone]

## 2023-02-08 NOTE — PROGRESS NOTES
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 She lives in [facility] by herself. She is . Her primary care physician is Dr. Elan Price. Her nephrologist is Dr. Fred Kelly.    She had a left arm AV fistula creation on 10/27/2022. She had balloon angioplasty of the fistula on 12/16/2022. She had transposition of the fistula on 1/31/2023. After the procedure, she felt short of breath and developed fever (measured as 101° Fahrenheit at home the next morning). Shortness of breath was not worsened or relieved by positional changes. She contacted the vascular surgeon, who advised her to go to the emergency department. She went to [facility]. She reported lower extremity edema, memory changes, confusion, poor appetite, and nausea that had been going on for a long time prior to the procedure. She was found to have hypoxia with oxygen saturation of 88%. Temperature was 101° Fahrenheit. WBC was elevated at  [address]12/uL. Chest X-ray showed clear lungs. Influenza, RSV, and COVID-19 were negative. She was admitted to [facility] Medicine Team B.      Overview/Hospital Course:  No notes on file    Interval History: Virtual follow up visit for suspected Shortness of breath [R06.02]  Hypoxia [R09.02]  Fever [R50.9] present on admission.   This service was provided by telemedicine.    Patient was transferred to [facility] on: 2/4/2023  The [facility]   Admitted 2/1/2023 12:25 PM    The patient is able to provide adequate history. Additional history was obtained from: chart review.  Overnight On Call Provider contacted about pruritis of feet; Atarax ordered but ineffective  Patient reports complaints of weakness. Comfortable on RA all day. SpO2 70% with ambulation on 2/5.  Symptoms are stable since yesterday.     I have reviewed the following on 2/7/2023:     Details     [x]   Lab results  H&H stable    []   Micro reports     []   Pathology reports     []   Imaging reports     []   Cardiology Procedure reports     []   Outside records/CareEverywhere     []  Independently viewed:       Review of Systems   Constitutional:  Positive for fatigue. Negative for fever.     Objective:     Vital Signs (Most Recent):  Temp: 98.5 °F (36.9 °C) (02/07/23 1202)  Pulse: 77 (02/07/23 1202)  Resp: 18 (02/07/23 1202)  BP: (!) 141/68 (02/07/23 1202)  SpO2: (!) 92 % (02/07/23 1202)   Vital Signs (24h Range):  Temp:  [98.1 °F (36.7 °C)-98.8 °F (37.1 °C)] 98.5 °F (36.9 °C)  Pulse:  [77-80] 77  Resp:  [16-18] 18  SpO2:  [87 %-96 %] 92 %  BP: (129-193)/(61-79) 141/68     Weight: 104.3 kg (230 lb)  Body mass index is 36.02 kg/m².    Intake/Output Summary (Last 24 hours) at 2/7/2023 1445  Last data filed at 2/7/2023 1300  Gross per 24 hour   Intake 720 ml   Output 700 ml   Net 20 ml        Physical Exam  Constitutional:       Appearance: She is obese.   Cardiovascular:      Comments: Monitor and/or Vital signs reviewed  at time of visit  Pulmonary:      Effort: Pulmonary effort is normal. No accessory muscle usage or respiratory distress.   Neurological:      Mental Status: She is alert. She is not disoriented.   Psychiatric:         Attention and Perception: Attention normal.         Mood and Affect: Affect normal.         Behavior: Behavior is cooperative.         Cognition and Memory: Memory is impaired.       Significant Labs:   Recent Labs   Lab 09/07/22  1047 11/17/22  0813 02/02/23  2130   HGBA1C 6.8* 5.5 5.2       Recent Labs   Lab 02/04/23  0556 02/06/23  0431 02/07/23  0853   WBC 11.90 9.57 10.87   HGB 8.6* 8.4* 9.9*   HCT 28.3* 27.6* 31.7*   * 151 197       Recent Labs   Lab 02/03/23  0424 02/04/23  0556 02/06/23  0431   GRAN 67.6  7.5 74.5*  8.9* 71.3  6.8   LYMPH 21.7  2.4 14.4*  1.7 16.8*  1.6   MONO 8.6  1.0 8.7  1.0 8.7  0.8   EOS 0.1 0.1 0.2       Recent Labs   Lab 02/04/23  0556 02/05/23  1001 02/06/23  0431 02/07/23  0853   * 135* 134* 138   K 4.4 4.1 4.1 4.1    101 101 102   CO2 16* 21* 22* 24   BUN 47* 50* 52* 46*   CREATININE 4.2* 4.3* 4.0* 4.0*   GLU 81 149* 98 103   CALCIUM 8.2* 8.2* 8.4* 8.7   ALBUMIN 3.1* 3.0* 3.0* 3.2*   MG 1.8  --   --   --    PHOS 5.5* 5.2* 4.4 3.6       Recent Labs   Lab 02/01/23  1302   ALKPHOS 69   ALT 5*   AST 22   PROT 7.4   BILITOT 0.3       Recent Labs   Lab 02/01/23  1302   TROPONINI 0.010       Recent Labs   Lab 12/19/22  1031 01/27/23  1004   FERRITIN 108 61       Results for orders placed or performed in visit on 01/27/23   Vitamin D   Result Value Ref Range    Vit D, 25-Hydroxy 43 30 - 96 ng/mL     SARS-CoV2 (COVID-19) Qualitative PCR (no units)   Date Value   02/04/2023 Not Detected   02/01/2023 Not Detected     SARS-CoV-2 RNA, Amplification, Qual (no units)   Date Value   10/23/2020 Negative     POC Rapid COVID (no units)   Date Value   07/01/2021 Positive (A)       ECG Results              EKG 12-lead (Final result)  Result time 02/01/23  15:13:56      Final result by Interface, Lab In [facility] (02/01/23 15:13:56)                   Narrative:    Test Reason : R06.02,    Vent. Rate : 076 BPM     Atrial Rate : 076 BPM     P-R Int : 196 ms          QRS Dur : 092 ms      QT Int : 394 ms       P-R-T Axes : 051 044 042 degrees     QTc Int : 443 ms    Normal sinus rhythm  Low anterior forces  Abnormal ECG  When compared with ECG of 31-JAN-2023 13:45,  No significant change was found  Confirmed by Mark TREJO MD ([id number]) on 2/1/2023 3:13:50 PM    Referred By: [email or web address]   SELF           Confirmed By:Mark TREJO MD                                    Results for orders placed during the hospital encounter of 09/11/22    Echo    Interpretation Summary  · Severe left atrial enlargement.  · The left ventricle is normal in size with mild eccentric hypertrophy and normal systolic function.  · The estimated ejection fraction is 65%.  · Indeterminate left ventricular diastolic function.  · Normal right ventricular size with normal right ventricular systolic function.  · Mild mitral regurgitation.  · Normal central venous pressure (3 mmHg).      Echo  · Moderate left atrial enlargement.  · The left ventricle is normal in size with concentric hypertrophy and   normal systolic function.  · Left ventricular diastolic dysfunction.  · Normal right ventricular size with normal right ventricular systolic   function.  · Mild mitral regurgitation.  · Moderate tricuspid regurgitation.  · Intermediate central venous pressure (8 mmHg).  · There is pulmonary hypertension. The estimated PA systolic pressure is   58 mmHg.  · Small lateral pericardial effusion.  · The estimated PA systolic pressure is 58 mmHg.         Labs and Imaging within the last 24 hours listed above were reviewed.         Assessment/Plan:      * Acute hypoxemic respiratory failure  Chest X-ray clear. Checked chest CT. No clear etiology found: just increased fluid and atelectasis.   Checked ventilation-perfusion scan  to rule out pulmonary embolism; low prob.  Continue Lasix. Wean O2 as tolerated.  No improvement 2/5; Echo ordered. Check CVP, suspect volume overload.  Echo with CVP of 8 and PASP is 58 mmHg; Lasix changed to IV 80 mg BID on 2/6  Improved at rest; needs repeat walk test    Postoperative fever  No clear etiology  Blood cultures collected. Monitor. Follow up cultures - negative.  Remains afebrile.    Chronic diastolic heart failure  Continued home furosemide.  Changed Lasix to 80 mg IV BID on 2/6  Continuing current management with IV Lasix BID; walk test ordered    Chronic kidney disease (CKD), stage V  She reports symptoms indicative of this including chronic edema, nausea, confusion.   Has AV fistula but has not yet started dialysis. Continue sodium bicarbonate, calcitriol.  Consulted Nephrology:  - increased bicarb to 1300 TID, added sevelamer 800 TID  - home dose Lasix 40 PO BID  - changed amlodipine to nifedipine; resumed Coreg  - Can hold calcitriol while inpatient  Trial of diuresis for hypoxia; no indication for HD at this time.    Renovascular hypertension  She takes amlodipine and carvedilol.   Changing amlodipine to nifedipine; added Coreg at lower dose, monitoring HR.  Increased Coreg 2/5; BP improving.    Metabolic bone disease  Started phosphate binder    A-V fistula  Vascular Surgery evaluated it since she had a procedure (baslic vein transposition) prior to developing shortness of breath and fever.  Doubt infection at this time; remains afebrile.    Controlled type 2 diabetes mellitus with stable proliferative retinopathy of both eyes, unspecified whether long term insulin use  Diet controlled.    Diabetic polyneuropathy associated with type 2 diabetes mellitus  Reportedly allergic to Neurontin    Postoperative hypothyroidism  Continue home levothyroxine.    Pulmonary hypertension  Patient presenting with hypoxia and PASP of 58  Follow up with Cardiology; OP referral placed.    Chronic allergic  rhinitis  Continue home montelukast. She takes levocetirizine.    Generalized anxiety disorder  Continue home escitalopram.    Factor XI deficiency  Noted.     Discharge planning   Diet: Diet diabetic [facility]; 2000 Calorie; Renal; Fluid - 1500mL  Significant LDAs:   IV Access Type: Peripheral and Dialysis Access  Urinary Catheter Indication if present: Patient Does Not Have Urinary Catheter  Other Lines/Tubes/Drains:     Goals of Care:    Previous admission:  1/31/23  Code Status: Full Code  Likely prognosis:  Fair  Advance Care Planning   Date: 02/04/2023  Patient wishes to continue curative therapies and return to previous level of function.     LINDSEY: 2/8/2023     Code Status: Full Code   Is the patient medically ready for discharge?: No    Reason for patient still in hospital (select all that apply): Patient trending condition and Consult recommendations  Discharge Plan A: Home      High Risk Conditions:  Patient has a condition that poses threat to life and bodily function: Severe Respiratory Distress    The amount of time spent during, and associated with, this encounter was 50 minutes; the nature of the activities performed were:  Preparing to see the patient, chart review  Obtaining and/or receiving separately obtained history   Performing medically appropriate examination and evaluation  Counseling and educating the patient/family/caregiver  Ordering medications, tests, or procedures  Referring to and communicating with other health care professionals  Documenting clinical information in the EHR  Independently interpreting results  Care coordination        Active Hospital Problems    Diagnosis  POA    *Acute hypoxemic respiratory failure [J96.01]  Yes     Priority: 1 - High    Postoperative fever [R50.82]  Yes     Priority: 2     Chronic diastolic heart failure [I50.32]  Yes     Priority: 2      Chronic     2-8-19  · Mild left atrial enlargement.  · Mild left ventricular enlargement.  · Normal  left ventricular systolic function. The estimated ejection fraction is 55%  · Indeterminate left ventricular diastolic function.  · Normal right ventricular systolic function.  · Mild mitral regurgitation.  · Mild to moderate tricuspid regurgitation.  · The estimated PA systolic pressure is 32 mm Hg  · Normal central venous pressure (3 mm Hg).  · Trivial peircumferential pericardial effusion.      Chronic kidney disease (CKD), stage V [N18.5]  Yes     Priority: 3      Chronic    Renovascular hypertension [I15.0]  Yes     Priority: 3      Chronic    Metabolic bone disease [M89.8X9]  Yes     Priority: 4     A-V fistula [I77.0]  Yes     Priority: 5     Type 2 diabetes mellitus with stage 5 chronic kidney disease not on chronic dialysis, without long-term current use of insulin [E11.22, N18.5]  Yes     Priority: 6      Chronic    Controlled type 2 diabetes mellitus with stable proliferative retinopathy of both eyes, unspecified whether long term insulin use [E11.3553]  Yes     Priority: 6      Chronic    Diabetic polyneuropathy associated with type 2 diabetes mellitus [E11.42]  Yes     Priority: 6      Chronic    Postoperative hypothyroidism [E89.0]  Yes     Priority: 8      Chronic    Pulmonary hypertension [I27.20]  Yes     Priority: 13     Chronic allergic rhinitis [J30.9]  Yes     Priority: 18      Chronic    Generalized anxiety disorder [F41.1]  Yes     Priority: 22      Chronic    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]  Yes     Priority: 23      Chronic     Require FFP for surgeries.    7-31-19  Factor XI Activity 55 - 145 % 96            Peanut allergy [Z91.010]  Yes     Priority: 24     Discharge planning  [Z02.9]  Not Applicable     Priority: 25 - Low      Resolved Hospital Problems   No resolved problems to display.       Inpatient Medications Prescribed for Management of Current Problems:     Scheduled Meds:    carvediloL  12.5 mg Oral BID WM    cholecalciferol (vitamin D3)  5,000 Units Oral Daily     EScitalopram oxalate  10 mg Oral Daily    furosemide (LASIX) injection  80 mg Intravenous BID loop    guaiFENesin  600 mg Oral BID    levothyroxine  200 mcg Oral Before breakfast    miconazole   Topical (Top) BID    montelukast  5 mg Oral QHS    mupirocin   Nasal BID    NIFEdipine  60 mg Oral Nightly    polyethylene glycol  17 g Oral BID    senna-docusate 8.6-50 mg  1 tablet Oral BID    sevelamer carbonate  800 mg Oral TID WM    sodium bicarbonate  1,300 mg Oral TID     Continuous Infusions:   As Needed: acetaminophen, diphenhydrAMINE, hydrALAZINE, oxyCODONE, traZODone    VTE Risk Mitigation (From admission, onward)         Ordered     Place sequential compression device  Until discontinued         02/02/23 2012              I have completed this tele-visit without the assistance of a telepresenter.    The attending portion of this evaluation, treatment, and documentation was performed per Arlene Paul MD via Telemedicine AudioVisual using the secure [employer] software platform with 2 way audio/video. The provider was located off-site and the patient is located in the hospital. The aforementioned video software was utilized to document the relevant history and physical exam. Secure [employer] software platform was used instead of [employer]ial[employer] for this visit.      Arlene Paul MD  Department of Hospital Medicine   [facility] - Intensive Care ([place])

## 2023-02-09 DIAGNOSIS — Z00.00 ENCOUNTER FOR MEDICARE ANNUAL WELLNESS EXAM: ICD-10-CM

## 2023-02-09 LAB
ALBUMIN SERPL BCP-MCNC: 3.3 G/DL (ref 3.5–5.2)
ANION GAP SERPL CALC-SCNC: 15 MMOL/L (ref 8–16)
BACTERIA BLD CULT: NORMAL
BUN SERPL-MCNC: 45 MG/DL (ref 8–23)
CALCIUM SERPL-MCNC: 9.3 MG/DL (ref 8.7–10.5)
CHLORIDE SERPL-SCNC: 102 MMOL/L (ref 95–110)
CO2 SERPL-SCNC: 25 MMOL/L (ref 23–29)
CREAT SERPL-MCNC: 3.9 MG/DL (ref 0.5–1.4)
EST. GFR  (NO RACE VARIABLE): 12.5 ML/MIN/1.73 M^2
GLUCOSE SERPL-MCNC: 141 MG/DL (ref 70–110)
PHOSPHATE SERPL-MCNC: 4.1 MG/DL (ref 2.7–4.5)
POTASSIUM SERPL-SCNC: 4.3 MMOL/L (ref 3.5–5.1)
SODIUM SERPL-SCNC: 142 MMOL/L (ref 136–145)
TB INDURATION 48 - 72 HR READ: 0 MM

## 2023-02-09 PROCEDURE — 99232 SBSQ HOSP IP/OBS MODERATE 35: CPT | Mod: HCNC,NTX,, | Performed by: INTERNAL MEDICINE

## 2023-02-09 PROCEDURE — 80069 RENAL FUNCTION PANEL: CPT | Mod: HCNC,NTX | Performed by: INTERNAL MEDICINE

## 2023-02-09 PROCEDURE — 25000003 PHARM REV CODE 250: Mod: HCNC,NTX | Performed by: HOSPITALIST

## 2023-02-09 PROCEDURE — 99233 SBSQ HOSP IP/OBS HIGH 50: CPT | Mod: HCNC,95,NTX, | Performed by: INTERNAL MEDICINE

## 2023-02-09 PROCEDURE — 99232 PR SUBSEQUENT HOSPITAL CARE,LEVL II: ICD-10-PCS | Mod: HCNC,NTX,, | Performed by: INTERNAL MEDICINE

## 2023-02-09 PROCEDURE — 36415 COLL VENOUS BLD VENIPUNCTURE: CPT | Mod: HCNC,NTX | Performed by: INTERNAL MEDICINE

## 2023-02-09 PROCEDURE — 25000003 PHARM REV CODE 250: Mod: HCNC,NTX | Performed by: INTERNAL MEDICINE

## 2023-02-09 PROCEDURE — 97116 GAIT TRAINING THERAPY: CPT | Mod: HCNC,NTX

## 2023-02-09 PROCEDURE — 99233 PR SUBSEQUENT HOSPITAL CARE,LEVL III: ICD-10-PCS | Mod: HCNC,95,NTX, | Performed by: INTERNAL MEDICINE

## 2023-02-09 PROCEDURE — 12000002 HC ACUTE/MED SURGE SEMI-PRIVATE ROOM: Mod: HCNC,NTX

## 2023-02-09 RX ORDER — POLYETHYLENE GLYCOL 3350 17 G/17G
17 POWDER, FOR SOLUTION ORAL 2 TIMES DAILY PRN
Status: DISCONTINUED | OUTPATIENT
Start: 2023-02-09 | End: 2023-02-10 | Stop reason: HOSPADM

## 2023-02-09 RX ORDER — AMOXICILLIN 250 MG
1 CAPSULE ORAL 2 TIMES DAILY PRN
Status: DISCONTINUED | OUTPATIENT
Start: 2023-02-09 | End: 2023-02-10 | Stop reason: HOSPADM

## 2023-02-09 RX ADMIN — ESCITALOPRAM OXALATE 10 MG: 10 TABLET ORAL at 08:02

## 2023-02-09 RX ADMIN — FUROSEMIDE 80 MG: 20 TABLET ORAL at 08:02

## 2023-02-09 RX ADMIN — MICONAZOLE NITRATE: 20 CREAM TOPICAL at 09:02

## 2023-02-09 RX ADMIN — LEVOTHYROXINE SODIUM 200 MCG: 100 TABLET ORAL at 06:02

## 2023-02-09 RX ADMIN — MICONAZOLE NITRATE: 20 CREAM TOPICAL at 08:02

## 2023-02-09 RX ADMIN — NIFEDIPINE 60 MG: 30 TABLET, FILM COATED, EXTENDED RELEASE ORAL at 09:02

## 2023-02-09 RX ADMIN — CARVEDILOL 12.5 MG: 12.5 TABLET, FILM COATED ORAL at 08:02

## 2023-02-09 RX ADMIN — MONTELUKAST SODIUM 5 MG: 5 TABLET, CHEWABLE ORAL at 09:02

## 2023-02-09 RX ADMIN — GUAIFENESIN 600 MG: 600 TABLET, EXTENDED RELEASE ORAL at 09:02

## 2023-02-09 RX ADMIN — GUAIFENESIN 600 MG: 600 TABLET, EXTENDED RELEASE ORAL at 08:02

## 2023-02-09 RX ADMIN — FUROSEMIDE 80 MG: 20 TABLET ORAL at 05:02

## 2023-02-09 RX ADMIN — CHOLECALCIFEROL TAB 125 MCG (5000 UNIT) 5000 UNITS: 125 TAB at 08:02

## 2023-02-09 RX ADMIN — MUPIROCIN: 20 OINTMENT TOPICAL at 08:02

## 2023-02-09 RX ADMIN — MUPIROCIN: 20 OINTMENT TOPICAL at 09:02

## 2023-02-09 RX ADMIN — TRAZODONE HYDROCHLORIDE 50 MG: 50 TABLET ORAL at 09:02

## 2023-02-09 RX ADMIN — CARVEDILOL 12.5 MG: 12.5 TABLET, FILM COATED ORAL at 05:02

## 2023-02-09 NOTE — ASSESSMENT & PLAN NOTE
Chest X-ray clear. Checked chest CT. No clear etiology found: just increased vascular congestion and atelectasis.   Checked ventilation-perfusion scan to rule out pulmonary embolism; low prob.  Continued Lasix. Wean O2 as tolerated.  No improvement 2/5; Echo ordered. Checked CVP, suspect volume overload.  Echo with CVP of 8 and PASP is 58 mmHg; Lasix changed to IV 80 mg BID on 2/6  Improved at rest; dropped to SpO2 of 70% with first walk test; did well with repeat walk test on 2/8  Does not qualify for home O2

## 2023-02-09 NOTE — ASSESSMENT & PLAN NOTE
Diet: Diet diabetic [facility]; 2000 Calorie; Renal; Fluid - 1500mL  Significant LDAs:   IV Access Type: Peripheral and Dialysis Access  Urinary Catheter Indication if present: Patient Does Not Have Urinary Catheter  Other Lines/Tubes/Drains:     Goals of Care:    Previous admission:  1/31/23  Code Status: Full Code  Likely prognosis:  Fair  Advance Care Planning   Date: 02/04/2023  Patient wishes to continue curative therapies and return to previous level of function.      LINDSEY: 2/9/2023     Code Status: Full Code   Is the patient medically ready for discharge?: No    Reason for patient still in hospital (select all that apply): Patient trending condition and Consult recommendations  Discharge Plan A: Home      High Risk Conditions:  Patient has a condition that poses threat to life and bodily function: Severe Respiratory Distress    The amount of time spent during, and associated with, this encounter was 50 minutes; the nature of the activities performed were:  Preparing to see the patient, chart review  Obtaining and/or receiving separately obtained history   Performing medically appropriate examination and evaluation  Counseling and educating the patient/family/caregiver  Ordering medications, tests, or procedures  Referring to and communicating with other health care professionals  Documenting clinical information in the EHR  Independently interpreting results  Care coordination

## 2023-02-09 NOTE — ASSESSMENT & PLAN NOTE
She takes amlodipine and carvedilol.   Changing amlodipine to nifedipine; added Coreg at lower dose, monitoring HR.  Increased Coreg on 2/5; BP improving.

## 2023-02-09 NOTE — ASSESSMENT & PLAN NOTE
61 yo F with CKD 5 2/2 to DM and poorly controlled HTN not yet started on HD with [facility]UNA. Previous GN workup was negative. Unclear why she was taken off lisinopril in the past. Currently also undergoing transplant evaluation. Has AVF, s/p transposition. Would need line if starting HD. Reports some fatigue, but no emergent need for HD for electrolytes, clearance or volume status. Cr trending down from 4.6-->4.2. Creatinine 3.6 on admit, baseline around 3-3.6. Phos elevated at 5.5, bicarb 16    UPCr 1.37 (5/2019). Previously as high as 4.5.    (1/27/23)   UA: 2+ protein, 1+blood, 22 WBC  RP US: (11/17/22): medical renal disease    Lab Results   Component Value Date    CREATININE 4.0 (H) 02/08/2023       Plan:   - Stable Cr, Appears close to euvolemic.   - Can continue lasix to 80 PO BID while admitted  - Patient reports taking lasix 40 mg PO BID, can go back to home dose at discharge  - Significant amount of proteinuria seen previously, would recommend outpatient ACEi or ARB  - Can hold calcitriol while inpatient  - Strict I&Os and daily weights   - Daily BMP  - Trend sCr  - Avoid nephrotoxic agents such as NSAIDs, gadolinium, ACEi, ARBs and IV radiocontrast.  - Renally dose meds to current GFR.  - Maintain MAP > 65.  - No indications for HD at this time.   - Maintain electrolytes at goal: Mg > 2, Phos > 3, K > 4.

## 2023-02-09 NOTE — SUBJECTIVE & OBJECTIVE
Interval History: No labs this am,  cc this shift, none recorded for past 2 shifts. Patient feels well. On room air at rest.     Review of patient's allergies indicates:   Allergen Reactions    Atorvastatin Other (See Comments)     Statin induced myalgia    Mushroom Hives    Peanut Anaphylaxis    Bactrim [sulfamethoxazole-trimethoprim] Hives    Gabapentin      Pruritis     Iodine     Peanut butter flavor     Penicillins Hives    Shellfish containing products Itching    Sulfa (sulfonamide antibiotics) Hives     Current Facility-Administered Medications   Medication Frequency    acetaminophen tablet 650 mg Q6H PRN    carvediloL tablet 12.5 mg BID WM    cholecalciferol (vitamin D3) 125 mcg (5,000 unit) tablet 5,000 Units Daily    diphenhydrAMINE capsule 25 mg Q6H PRN    EScitalopram oxalate tablet 10 mg Daily    furosemide tablet 80 mg BID    guaiFENesin 12 hr tablet 600 mg BID    hydrALAZINE injection 5 mg Q8H PRN    levothyroxine tablet 200 mcg Before breakfast    miconazole 2 % cream BID    montelukast chewable tablet 5 mg QHS    mupirocin 2 % ointment BID    NIFEdipine 24 hr tablet 60 mg Nightly    oxyCODONE immediate release tablet 5 mg Q4H PRN    polyethylene glycol packet 17 g BID    senna-docusate 8.6-50 mg per tablet 1 tablet BID    traZODone tablet 50 mg Nightly PRN       Objective:     Vital Signs (Most Recent):  Temp: 98.3 °F (36.8 °C) (02/09/23 0739)  Pulse: 76 (02/09/23 0739)  Resp: 18 (02/09/23 0739)  BP: (!) 107/54 (02/09/23 0739)  SpO2: (!) 85 % (02/09/23 0739)   Vital Signs (24h Range):  Temp:  [98.2 °F (36.8 °C)-98.4 °F (36.9 °C)] 98.3 °F (36.8 °C)  Pulse:  [74-81] 76  Resp:  [17-20] 18  SpO2:  [85 %-91 %] 85 %  BP: (107-158)/(54-81) 107/54     Weight: 104.3 kg (230 lb) (02/06/23 1119)  Body mass index is 36.02 kg/m².  Body surface area is 2.22 meters squared.    I/O last 3 completed shifts:  In: 960 [P.O.:960]  Out: 300 [Urine:300]    Physical Exam  Vitals and nursing note reviewed.    Constitutional:       Appearance: She is well-developed. She is obese. She is not diaphoretic.      Interventions: Nasal cannula in place.   HENT:      Head: Normocephalic and atraumatic.   Eyes:      General: No scleral icterus.     Conjunctiva/sclera: Conjunctivae normal.   Cardiovascular:      Rate and Rhythm: Normal rate and regular rhythm.      Heart sounds: No murmur heard.    No friction rub.   Pulmonary:      Effort: Pulmonary effort is normal. No respiratory distress.      Breath sounds: No stridor. No wheezing or rales.   Abdominal:      General: There is no distension.      Palpations: Abdomen is soft.      Tenderness: There is no abdominal tenderness. There is no guarding.   Musculoskeletal:         General: No signs of injury.      Comments: Left AV fistula site dressing in place   Skin:     General: Skin is warm and dry.      Coloration: Skin is not jaundiced or pale.   Neurological:      Mental Status: She is alert.      Cranial Nerves: No dysarthria.      Motor: No atrophy or seizure activity.   Psychiatric:         Attention and Perception: Attention normal.         Mood and Affect: Mood and affect normal.         Behavior: Behavior is cooperative.       Significant Labs:  CBC:   Recent Labs   Lab 02/08/23  0250   WBC 10.80   RBC 3.63*   HGB 9.0*   HCT 28.7*      MCV 79*   MCH 24.8*   MCHC 31.4*     CMP:   Recent Labs   Lab 02/08/23  0250      CALCIUM 8.6*   ALBUMIN 3.0*      K 3.9   CO2 28      BUN 54*   CREATININE 4.0*     All labs within the past 24 hours have been reviewed.     Significant Imaging:  Labs: Reviewed

## 2023-02-09 NOTE — ASSESSMENT & PLAN NOTE
Continued home furosemide.  Changed Lasix to 80 mg IV BID on 2/6  Continued current management with IV Lasix BID  Changed to oral Lasix on 2/8 PM  Needs to adhere for fluid restriction and oral Lasix BID at discharge

## 2023-02-09 NOTE — PT/OT/SLP PROGRESS
"Physical Therapy Treatment    Patient Name:  Kathie Quezada   MRN:  [id number]    Recommendations:     Discharge Recommendations: home health PT  Discharge Equipment Recommendations: walker, rolling  Barriers to discharge: Pt currently requiring increased level of assistance with mobility    Assessment:     Kathie Quezada is a 63 y.o. female admitted with a medical diagnosis of Acute hypoxemic respiratory failure.  She presents with the following impairments/functional limitations: impaired endurance, gait instability, impaired balance, decreased lower extremity function. Pt amb within room distances and presented with improved overall steadiness; however would benefit from amb with RW for longer distances. Pt continues to benefit from skilled PT services while in house in order to address the aforementioned deficits.    Rehab Prognosis: Good; patient would benefit from acute skilled PT services to address these deficits and reach maximum level of function.    Recent Surgery: * No surgery found *      Plan:     During this hospitalization, patient to be seen 3 x/week to address the identified rehab impairments via gait training, therapeutic activities, therapeutic exercises, neuromuscular re-education and progress toward the following goals:    Plan of Care Expires:  03/07/23    Subjective     "You came during my favorite show"    Pain/Comfort:  Pain Rating 1: 0/10  Pain Rating Post-Intervention 1: 0/10      Objective:     Communicated with RN prior to session.  Patient found HOB elevated with  (no lines intact) upon PT entry to room.     General Precautions: Standard, fall  Orthopedic Precautions: N/A  Braces: N/A  Respiratory Status: Room air     Functional Mobility:  Bed Mobility:     Scooting: supervision  Supine to Sit: supervision  Transfers:     Sit to Stand:  supervision with no AD  Gait: Pt amb 30' + 10' no AD SBA with brief sitting break in between bouts. Pt presented with increased L " lateral lean, intermittent L UE external support, fair linda, IBRAHIM. Pt deferred amb outside of room at this time.  Balance:   Good sitting balance  Fair standing balance      AM-PAC 6 CLICK MOBILITY  Turning over in bed (including adjusting bedclothes, sheets and blankets)?: 4  Sitting down on and standing up from a chair with arms (e.g., wheelchair, bedside commode, etc.): 4  Moving from lying on back to sitting on the side of the bed?: 4  Moving to and from a bed to a chair (including a wheelchair)?: 3  Need to walk in hospital room?: 3  Climbing 3-5 steps with a railing?: 3  Basic Mobility Total Score: 21       Treatment & Education:  Discussed sitting upright at EOB >1hr, pt agreeable  Discussed amb to restroom with RN/staff outside of therapy services    Educated pt on PT role/POC  Educated pt on importance of OOB activity and daily ambulation   Pt educated on proper body mechanics, safety techniques, and energy conservation with PT facilitation and cueing throughout session   Pt verbalized understanding      Patient left sitting edge of bed with all lines intact, call button in reach, RN notified, and son present..    GOALS:   Multidisciplinary Problems       Physical Therapy Goals          Problem: Physical Therapy    Goal Priority Disciplines Outcome Goal Variances Interventions   Physical Therapy Goal     PT, PT/OT Ongoing, Progressing     Description: Goals to be met by: 2023     Patient will increase functional independence with mobility by performin. Gait  x 150 feet with Supervision using LRAD.   2. Ascend/descend 1 stair with no Handrails Contact Guard Assistance using LRAD.                          Time Tracking:     PT Received On: 23  PT Start Time: 1525     PT Stop Time: 1535  PT Total Time (min): 10 min     Billable Minutes: Gait Training 10    Treatment Type: Treatment  PT/PTA: PT           2023

## 2023-02-09 NOTE — PROGRESS NOTES
[facility] - [facility] ([place] [id number])  Nephrology  Progress Note    Patient Name: Kathie Quezada  MRN: [id number]  Admission Date: 2/1/2023  Hospital Length of Stay: 8 days  Attending Provider: Kisha Crystal MD   Primary Care Physician: Elan Price MD  Principal Problem:Acute hypoxemic respiratory failure    Subjective:     HPI:  Kathie Quezada 61 yo F with PMHX of obesity, hypertension, diabetes mellitus type 2 (A1c 5.2) with retinopathy and polyneuropathy, chronic diastolic heart failure (EF 65%), CKD V not yet initiated on HD, hx of stroke, s/p thyroidectomy in 2016 with postoperative hypothyroidism, 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 She was admitted 1 day after transposition of her AVF by vascular surgery on 1/31/23. She had AVF creation back in 10/27/2022. After the procedure, she felt SOB, developed fever of 101 and came to the ED. She reported lower extremity edema, memory changes, confusion, poor appetite, and nausea that had been going on for a long time prior to the procedure. Also noted to have leukocytosis with WBC count 13 which is now normal. She was treated for post op atelectasis which was seen on CT chest w/o contrast with incentive spirometry. VQ perfusion scan revealed low probability for PE. Blood cultures with NGTD, fever trending down, covid/flu/rsv negative.     Nephrology consulted for ARUNA on CKD5. She follows with  for nephrology, last seen in 1/27/23. Prior to that, followed with Dr. Kimball. Currently also undergoing transplant evaluation. Presumed to have CKD 2/2 to HTN and diabetes. She had received clindamycin for surgical prophylaxis, but no other abx. Baseline Cr appears to be around 3 to 3.6 with progression over the last few years to CKD V. Her creatinine trend has been 3.6-->4.4-->4.6--4.2  as of 2/4/22. Previous workup in 2018 revealed negative JB, ANCA, HIV, C3/C4. She is Hep C antibody + s/p treatment with Harvoni in 2016. Hep B core/surface Ab +, surface Ag negative. Previous kappa/lambda ration was 1.61, not diagnostic of myeloma in context of CKD. She previously had nephrotic range proteinuria around 4.5 (2018) that has since decreased to 1.37 (May 2019). PTH was 342 (1/27/23), increased from 283 before, for which she was started on calcitriol by Dr. Kelly. She states that she has not yet picked it up and started it prior to hospitalization. Unclear why her lisinopril was stopped. She reports fatigue and arm pain. Denies NSAID use, dysuria, hematuria. She reports poor BP control at home with BP running in 180s/90s. CXR revealed cardiomegaly but no pleural effusion. Significant labs include BUN 47/Cr 4.2, improving, bicarb 16, K wnl, phos 5.5.           Interval History: No labs this am,  cc this shift, none recorded for past 2 shifts. Patient feels well. On room air at rest.     Review of patient's allergies indicates:   Allergen Reactions    Atorvastatin Other (See Comments)     Statin induced myalgia    Mushroom Hives    Peanut Anaphylaxis    Bactrim [sulfamethoxazole-trimethoprim] Hives    Gabapentin      Pruritis     Iodine     Peanut butter flavor     Penicillins Hives    Shellfish containing products Itching    Sulfa (sulfonamide antibiotics) Hives     Current Facility-Administered Medications   Medication Frequency    acetaminophen tablet 650 mg Q6H PRN    carvediloL tablet 12.5 mg BID WM    cholecalciferol (vitamin D3) 125 mcg (5,000 unit) tablet 5,000 Units Daily    diphenhydrAMINE capsule 25 mg Q6H PRN    EScitalopram oxalate tablet 10 mg Daily    furosemide tablet 80 mg BID    guaiFENesin 12 hr tablet 600 mg BID    hydrALAZINE injection 5 mg Q8H PRN    levothyroxine tablet 200 mcg Before breakfast    miconazole 2 % cream BID    montelukast chewable tablet  5 mg QHS    mupirocin 2 % ointment BID    NIFEdipine 24 hr tablet 60 mg Nightly    oxyCODONE immediate release tablet 5 mg Q4H PRN    polyethylene glycol packet 17 g BID    senna-docusate 8.6-50 mg per tablet 1 tablet BID    traZODone tablet 50 mg Nightly PRN       Objective:     Vital Signs (Most Recent):  Temp: 98.3 °F (36.8 °C) (02/09/23 0739)  Pulse: 76 (02/09/23 0739)  Resp: 18 (02/09/23 0739)  BP: (!) 107/54 (02/09/23 0739)  SpO2: (!) 85 % (02/09/23 0739)   Vital Signs (24h Range):  Temp:  [98.2 °F (36.8 °C)-98.4 °F (36.9 °C)] 98.3 °F (36.8 °C)  Pulse:  [74-81] 76  Resp:  [17-20] 18  SpO2:  [85 %-91 %] 85 %  BP: (107-158)/(54-81) 107/54     Weight: 104.3 kg (230 lb) (02/06/23 1119)  Body mass index is 36.02 kg/m².  Body surface area is 2.22 meters squared.    I/O last 3 completed shifts:  In: 960 [P.O.:960]  Out: 300 [Urine:300]    Physical Exam  Vitals and nursing note reviewed.   Constitutional:       Appearance: She is well-developed. She is obese. She is not diaphoretic.      Interventions: Nasal cannula in place.   HENT:      Head: Normocephalic and atraumatic.   Eyes:      General: No scleral icterus.     Conjunctiva/sclera: Conjunctivae normal.   Cardiovascular:      Rate and Rhythm: Normal rate and regular rhythm.      Heart sounds: No murmur heard.    No friction rub.   Pulmonary:      Effort: Pulmonary effort is normal. No respiratory distress.      Breath sounds: No stridor. No wheezing or rales.   Abdominal:      General: There is no distension.      Palpations: Abdomen is soft.      Tenderness: There is no abdominal tenderness. There is no guarding.   Musculoskeletal:         General: No signs of injury.      Comments: Left AV fistula site dressing in place   Skin:     General: Skin is warm and dry.      Coloration: Skin is not jaundiced or pale.   Neurological:      Mental Status: She is alert.      Cranial Nerves: No dysarthria.      Motor: No atrophy or seizure activity.   Psychiatric:          Attention and Perception: Attention normal.         Mood and Affect: Mood and affect normal.         Behavior: Behavior is cooperative.       Significant Labs:  CBC:   Recent Labs   Lab 02/08/23  0250   WBC 10.80   RBC 3.63*   HGB 9.0*   HCT 28.7*      MCV 79*   MCH 24.8*   MCHC 31.4*     CMP:   Recent Labs   Lab 02/08/23  0250      CALCIUM 8.6*   ALBUMIN 3.0*      K 3.9   CO2 28      BUN 54*   CREATININE 4.0*     All labs within the past 24 hours have been reviewed.     Significant Imaging:  Labs: Reviewed    Assessment/Plan:     Chronic kidney disease (CKD), stage V  61 yo F with CKD 5 2/2 to DM and poorly controlled HTN not yet started on HD with ARUNA. Previous GN workup was negative. Unclear why she was taken off lisinopril in the past. Currently also undergoing transplant evaluation. Has AVF, s/p transposition. Would need line if starting HD. Reports some fatigue, but no emergent need for HD for electrolytes, clearance or volume status. Cr trending down from 4.6-->4.2. Creatinine 3.6 on admit, baseline around 3-3.6. Phos elevated at 5.5, bicarb 16    UPCr 1.37 (5/2019). Previously as high as 4.5.    (1/27/23)   UA: 2+ protein, 1+blood, 22 WBC  RP US: (11/17/22): medical renal disease    Lab Results   Component Value Date    CREATININE 4.0 (H) 02/08/2023       Plan:   - Stable Cr, Appears close to euvolemic.   - Can continue lasix to 80 PO BID while admitted  - Patient reports taking lasix 40 mg PO BID, can go back to home dose at discharge  - Significant amount of proteinuria seen previously, would recommend outpatient ACEi or ARB  - Can hold calcitriol while inpatient  - Strict I&Os and daily weights   - Daily BMP  - Trend sCr  - Avoid nephrotoxic agents such as NSAIDs, gadolinium, ACEi, ARBs and IV radiocontrast.  - Renally dose meds to current GFR.  - Maintain MAP > 65.  - No indications for HD at this time.   - Maintain electrolytes at goal: Mg > 2, Phos > 3, K >  4.          Thank you for your consult. I will sign off. Please contact us if you have any additional questions.    Shama Olson MD  Nephrology  [facility] - Intensive Care ([facility]

## 2023-02-09 NOTE — ASSESSMENT & PLAN NOTE
She reports symptoms indicative of this including chronic edema, nausea, confusion.   Has AV fistula but has not yet started dialysis. Continued sodium bicarbonate, calcitriol.  Consulted Nephrology:  - increased bicarb to 1300 TID, added sevelamer 800 TID - both discontinued by Nephrology on 2/8  - home dose Lasix 40 PO BID  - changed amlodipine to nifedipine; resumed Coreg  - Can hold calcitriol while inpatient  Trial of diuresis for hypoxia was effective; no indication for HD at this time.  - discharge with Lasix 80 mg po BID, follow up with Nephrology

## 2023-02-09 NOTE — PROGRESS NOTES
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 She lives in [facility] by herself. She is . Her primary care physician is Dr. Elan Price. Her nephrologist is Dr. Fred Kelly.    She had a left arm AV fistula creation on 10/27/2022. She had balloon angioplasty of the fistula on 12/16/2022. She had transposition of the fistula on 1/31/2023. After the procedure, she felt short of breath and developed fever (measured as 101° Fahrenheit at home the next morning). Shortness of breath was not worsened or relieved by positional changes. She contacted the vascular surgeon, who advised her to go to the emergency department. She went to [facility]. She reported lower extremity edema, memory changes, confusion, poor appetite, and nausea that had been going on for a long time prior to the procedure. She was found to have hypoxia with oxygen saturation of 88%. Temperature was 101° Fahrenheit. WBC was elevated at  [address]/uL. Chest X-ray showed clear lungs. Influenza, RSV, and COVID-19 were negative. She was admitted to [facility] Medicine Team B.      Overview/Hospital Course:  No notes on file    Interval History: Virtual follow up visit for suspected Shortness of breath [R06.02]  Hypoxia [R09.02]  Fever [R50.9] present on admission.   This service was provided by telemedicine.    Patient was transferred to [facility] on: 2/4/2023  The patient location is: [facility]   Admitted 2/1/2023 12:25 PM    The patient is able to provide adequate history. Additional history was obtained from: chart review.  No significant events overnight reported.  Patient reports complaints of weakness. Comfortable on RA all day. SpO2 91% with ambulation now. Diuretics discussed with Nephrology  Symptoms are decreasing in severity since yesterday.     I have reviewed the following on 2/8/2023:     Details     [x]   Lab results  H&H stable; sCr stable    []   Micro reports     []   Pathology reports     []   Imaging reports     []   Cardiology Procedure reports     []   Outside records/CareEverywhere     []  Independently viewed:       Review of Systems   Constitutional:  Negative for fever.   Respiratory:  Negative for shortness of breath.      Objective:     Vital Signs (Most Recent):  Temp: 98.2 °F (36.8 °C) (02/08/23 1553)  Pulse: 81 (02/08/23 1553)  Resp: 18 (02/08/23 1553)  BP: (!) 145/62 (02/08/23 1553)  SpO2: (!) 90 % (02/08/23 1553)   Vital Signs (24h Range):  Temp:  [98.2 °F (36.8 °C)-98.5 °F (36.9 °C)] 98.2 °F (36.8 °C)  Pulse:  [74-84] 81  Resp:  [16-18] 18  SpO2:  [90 %-93 %] 90 %  BP: (123-157)/(60-81) 145/62     Weight: 104.3 kg (230 lb)  Body mass index is 36.02 kg/m².    Intake/Output Summary (Last 24 hours) at 2/8/2023 8479  Last data filed at 2/8/2023 1300  Gross per 24 hour   Intake 960 ml   Output 300 ml   Net 660 ml        Physical Exam  Constitutional:       Appearance: She is obese.   Cardiovascular:       Comments: Monitor and/or Vital signs reviewed at time of visit  Pulmonary:      Effort: Pulmonary effort is normal. No accessory muscle usage or respiratory distress.   Neurological:      Mental Status: She is alert. She is not disoriented.   Psychiatric:         Attention and Perception: Attention normal.         Mood and Affect: Affect normal.         Behavior: Behavior is cooperative.         Cognition and Memory: Memory is impaired.       Significant Labs:   Recent Labs   Lab 09/07/22  1047 11/17/22  0813 02/02/23  2130   HGBA1C 6.8* 5.5 5.2       Recent Labs   Lab 02/06/23  0431 02/07/23  0853 02/08/23  0250   WBC 9.57 10.87 10.80   HGB 8.4* 9.9* 9.0*   HCT 27.6* 31.7* 28.7*    197 173       Recent Labs   Lab 02/04/23  0556 02/06/23  0431 02/08/23  0250   GRAN 74.5*  8.9* 71.3  6.8 71.9  7.8*   LYMPH 14.4*  1.7 16.8*  1.6 15.7*  1.7   MONO 8.7  1.0 8.7  0.8 9.0  1.0   EOS 0.1 0.2 0.3       Recent Labs   Lab 02/04/23  0556 02/05/23  1001 02/06/23  0431 02/07/23  0853 02/08/23  0250   *   < > 134* 138 140   K 4.4   < > 4.1 4.1 3.9      < > 101 102 100   CO2 16*   < > 22* 24 28   BUN 47*   < > 52* 46* 54*   CREATININE 4.2*   < > 4.0* 4.0* 4.0*   GLU 81   < > 98 103 109   CALCIUM 8.2*   < > 8.4* 8.7 8.6*   ALBUMIN 3.1*   < > 3.0* 3.2* 3.0*   MG 1.8  --   --   --   --    PHOS 5.5*   < > 4.4 3.6 3.4    < > = values in this interval not displayed.       Recent Labs   Lab 12/19/22  1031 01/27/23  1004   FERRITIN 108 61       Results for orders placed or performed in visit on 01/27/23   Vitamin D   Result Value Ref Range    Vit D, 25-Hydroxy 43 30 - 96 ng/mL     SARS-CoV2 (COVID-19) Qualitative PCR (no units)   Date Value   02/04/2023 Not Detected   02/01/2023 Not Detected     SARS-CoV-2 RNA, Amplification, Qual (no units)   Date Value   10/23/2020 Negative     POC Rapid COVID (no units)   Date Value   07/01/2021 Positive (A)       ECG Results              EKG 12-lead (Final result)  Result  time 02/01/23 15:13:56      Final result by Interface, Lab In [facility] (02/01/23 15:13:56)                   Narrative:    Test Reason : R06.02,    Vent. Rate : 076 BPM     Atrial Rate : 076 BPM     P-R Int : 196 ms          QRS Dur : 092 ms      QT Int : 394 ms       P-R-T Axes : 051 044 042 degrees     QTc Int : 443 ms    Normal sinus rhythm  Low anterior forces  Abnormal ECG  When compared with ECG of 31-JAN-2023 13:45,  No significant change was found  Confirmed by Mark TREJO MD ([id number]) on 2/1/2023 3:13:50 PM    Referred By: [email or web address]   SELF           Confirmed By:Mark TREJO MD                                    Results for orders placed during the hospital encounter of 09/11/22    Echo    Interpretation Summary  · Severe left atrial enlargement.  · The left ventricle is normal in size with mild eccentric hypertrophy and normal systolic function.  · The estimated ejection fraction is 65%.  · Indeterminate left ventricular diastolic function.  · Normal right ventricular size with normal right ventricular systolic function.  · Mild mitral regurgitation.  · Normal central venous pressure (3 mmHg).      Echo  · Moderate left atrial enlargement.  · The left ventricle is normal in size with concentric hypertrophy and   normal systolic function.  · Left ventricular diastolic dysfunction.  · Normal right ventricular size with normal right ventricular systolic   function.  · Mild mitral regurgitation.  · Moderate tricuspid regurgitation.  · Intermediate central venous pressure (8 mmHg).  · There is pulmonary hypertension. The estimated PA systolic pressure is   58 mmHg.  · Small lateral pericardial effusion.  · The estimated PA systolic pressure is 58 mmHg.         Labs and Imaging within the last 24 hours listed above were reviewed.       Assessment/Plan:      * Acute hypoxemic respiratory failure  Chest X-ray clear. Checked chest CT. No clear etiology found: just increased vascular congestion and atelectasis.   Checked  ventilation-perfusion scan to rule out pulmonary embolism; low prob.  Continued Lasix. Wean O2 as tolerated.  No improvement 2/5; Echo ordered. Checked CVP, suspect volume overload.  Echo with CVP of 8 and PASP is 58 mmHg; Lasix changed to IV 80 mg BID on 2/6  Improved at rest; dropped to SpO2 of 70% with first walk test; did well with repeat walk test on 2/8  Does not qualify for home O2    Postoperative fever  No clear etiology  Blood cultures collected. Monitor. Follow up cultures - negative.  Remains afebrile.    Chronic diastolic heart failure  Continued home furosemide.  Changed Lasix to 80 mg IV BID on 2/6  Continued current management with IV Lasix BID  Changed to oral Lasix on 2/8 PM  Needs to adhere for fluid restriction and oral Lasix BID at discharge    Chronic kidney disease (CKD), stage V  She reports symptoms indicative of this including chronic edema, nausea, confusion.   Has AV fistula but has not yet started dialysis. Continued sodium bicarbonate, calcitriol.  Consulted Nephrology:  - increased bicarb to 1300 TID, added sevelamer 800 TID - both discontinued by Nephrology on 2/8  - home dose Lasix 40 PO BID  - changed amlodipine to nifedipine; resumed Coreg  - Can hold calcitriol while inpatient  Trial of diuresis for hypoxia was effective; no indication for HD at this time.  - discharge with Lasix 80 mg po BID, follow up with Nephrology    Renovascular hypertension  She takes amlodipine and carvedilol.   Changing amlodipine to nifedipine; added Coreg at lower dose, monitoring HR.  Increased Coreg on 2/5; BP improving.    Metabolic bone disease  Started on phosphate binder; then discontinued  Follow up with Nephrology    A-V fistula  Vascular Surgery evaluated it since she had a procedure (baslic vein transposition) prior to developing shortness of breath and fever.  Doubt infection at this time; remains afebrile.    Controlled type 2 diabetes mellitus with stable proliferative retinopathy of both  eyes, unspecified whether long term insulin use  Diet controlled.    Diabetic polyneuropathy associated with type 2 diabetes mellitus  Reportedly allergic to Neurontin    Postoperative hypothyroidism  Continue home levothyroxine.    Pulmonary hypertension  Patient presenting with hypoxia and PASP of 58  Follow up with Cardiology; OP referral placed.    Chronic allergic rhinitis  Continue home montelukast. She takes levocetirizine.    Generalized anxiety disorder  Continue home escitalopram.    Factor XI deficiency  Noted.     Peanut allergy        Discharge planning   Diet: Diet diabetic [facility]; 2000 Calorie; Renal; Fluid - 1500mL  Significant LDAs:   IV Access Type: Peripheral and Dialysis Access  Urinary Catheter Indication if present: Patient Does Not Have Urinary Catheter  Other Lines/Tubes/Drains:     Goals of Care:    Previous admission:  1/31/23  Code Status: Full Code  Likely prognosis:  Fair  Advance Care Planning   Date: 02/04/2023  Patient wishes to continue curative therapies and return to previous level of function.     LINDSEY: 2/9/2023     Code Status: Full Code   Is the patient medically ready for discharge?: No    Reason for patient still in hospital (select all that apply): Patient trending condition and Consult recommendations  Discharge Plan A: Home      High Risk Conditions:  Patient has a condition that poses threat to life and bodily function: Severe Respiratory Distress    The amount of time spent during, and associated with, this encounter was 50 minutes; the nature of the activities performed were:  Preparing to see the patient, chart review  Obtaining and/or receiving separately obtained history   Performing medically appropriate examination and evaluation  Counseling and educating the patient/family/caregiver  Ordering medications, tests, or procedures  Referring to and communicating with other health care professionals  Documenting clinical information in the EHR  Independently  interpreting results  Care coordination        Active Hospital Problems    Diagnosis  POA    *Acute hypoxemic respiratory failure [J96.01]  Yes     Priority: 1 - High    Postoperative fever [R50.82]  Yes     Priority: 2     Chronic diastolic heart failure [I50.32]  Yes     Priority: 2      Chronic     2-8-19  · Mild left atrial enlargement.  · Mild left ventricular enlargement.  · Normal left ventricular systolic function. The estimated ejection fraction is 55%  · Indeterminate left ventricular diastolic function.  · Normal right ventricular systolic function.  · Mild mitral regurgitation.  · Mild to moderate tricuspid regurgitation.  · The estimated PA systolic pressure is 32 mm Hg  · Normal central venous pressure (3 mm Hg).  · Trivial peircumferential pericardial effusion.      Chronic kidney disease (CKD), stage V [N18.5]  Yes     Priority: 3      Chronic    Renovascular hypertension [I15.0]  Yes     Priority: 3      Chronic    Metabolic bone disease [M89.8X9]  Yes     Priority: 4     A-V fistula [I77.0]  Yes     Priority: 5     Type 2 diabetes mellitus with stage 5 chronic kidney disease not on chronic dialysis, without long-term current use of insulin [E11.22, N18.5]  Yes     Priority: 6      Chronic    Controlled type 2 diabetes mellitus with stable proliferative retinopathy of both eyes, unspecified whether long term insulin use [E11.3553]  Yes     Priority: 6      Chronic    Diabetic polyneuropathy associated with type 2 diabetes mellitus [E11.42]  Yes     Priority: 6      Chronic    Postoperative hypothyroidism [E89.0]  Yes     Priority: 8      Chronic    Pulmonary hypertension [I27.20]  Yes     Priority: 13     Chronic allergic rhinitis [J30.9]  Yes     Priority: 18      Chronic    Generalized anxiety disorder [F41.1]  Yes     Priority: 22      Chronic    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]  Yes     Priority: 23      Chronic     Require FFP for surgeries.    7-31-19  Factor XI Activity 55 - 145 %  96            Peanut allergy [Z91.010]  Yes     Priority: 24     Discharge planning  [Z02.9]  Not Applicable     Priority: 25 - Low      Resolved Hospital Problems   No resolved problems to display.       Inpatient Medications Prescribed for Management of Current Problems:     Scheduled Meds:    carvediloL  12.5 mg Oral BID WM    cholecalciferol (vitamin D3)  5,000 Units Oral Daily    EScitalopram oxalate  10 mg Oral Daily    furosemide  80 mg Oral BID    guaiFENesin  600 mg Oral BID    levothyroxine  200 mcg Oral Before breakfast    miconazole   Topical (Top) BID    montelukast  5 mg Oral QHS    mupirocin   Nasal BID    NIFEdipine  60 mg Oral Nightly    polyethylene glycol  17 g Oral BID    senna-docusate 8.6-50 mg  1 tablet Oral BID     Continuous Infusions:   As Needed: acetaminophen, diphenhydrAMINE, hydrALAZINE, oxyCODONE, traZODone    VTE Risk Mitigation (From admission, onward)         Ordered     Place sequential compression device  Until discontinued         02/02/23 2012                I have completed this tele-visit without the assistance of a telepresenter.    The attending portion of this evaluation, treatment, and documentation was performed per Arlene Paul MD via Telemedicine AudioVisual using the secure Vidyo software platform with 2 way audio/video. The provider was located off-site and the patient is located in the hospital. The aforementioned video software was utilized to document the relevant history and physical exam. Secure e[employer]Manager software platform was used instead of [employer] for this visit.      Arlene Paul MD  Department of Hospital Medicine   [facility] - Intensive Care ([place])

## 2023-02-10 VITALS
TEMPERATURE: 98 F | HEART RATE: 71 BPM | SYSTOLIC BLOOD PRESSURE: 124 MMHG | WEIGHT: 230 LBS | RESPIRATION RATE: 15 BRPM | DIASTOLIC BLOOD PRESSURE: 59 MMHG | HEIGHT: 67 IN | OXYGEN SATURATION: 94 % | BODY MASS INDEX: 36.1 KG/M2

## 2023-02-10 LAB
ALBUMIN SERPL BCP-MCNC: 2.9 G/DL (ref 3.5–5.2)
ANION GAP SERPL CALC-SCNC: 13 MMOL/L (ref 8–16)
BASOPHILS # BLD AUTO: 0.07 K/UL (ref 0–0.2)
BASOPHILS NFR BLD: 0.6 % (ref 0–1.9)
BUN SERPL-MCNC: 43 MG/DL (ref 8–23)
CALCIUM SERPL-MCNC: 8.9 MG/DL (ref 8.7–10.5)
CHLORIDE SERPL-SCNC: 104 MMOL/L (ref 95–110)
CO2 SERPL-SCNC: 25 MMOL/L (ref 23–29)
CREAT SERPL-MCNC: 4 MG/DL (ref 0.5–1.4)
DIFFERENTIAL METHOD: ABNORMAL
EOSINOPHIL # BLD AUTO: 0.3 K/UL (ref 0–0.5)
EOSINOPHIL NFR BLD: 2.4 % (ref 0–8)
ERYTHROCYTE [DISTWIDTH] IN BLOOD BY AUTOMATED COUNT: 13.9 % (ref 11.5–14.5)
EST. GFR  (NO RACE VARIABLE): 12.1 ML/MIN/1.73 M^2
GLUCOSE SERPL-MCNC: 80 MG/DL (ref 70–110)
HCT VFR BLD AUTO: 28.9 % (ref 37–48.5)
HGB BLD-MCNC: 8.6 G/DL (ref 12–16)
IMM GRANULOCYTES # BLD AUTO: 0.07 K/UL (ref 0–0.04)
IMM GRANULOCYTES NFR BLD AUTO: 0.6 % (ref 0–0.5)
LYMPHOCYTES # BLD AUTO: 2.6 K/UL (ref 1–4.8)
LYMPHOCYTES NFR BLD: 22.9 % (ref 18–48)
MCH RBC QN AUTO: 24.2 PG (ref 27–31)
MCHC RBC AUTO-ENTMCNC: 29.8 G/DL (ref 32–36)
MCV RBC AUTO: 81 FL (ref 82–98)
MONOCYTES # BLD AUTO: 1 K/UL (ref 0.3–1)
MONOCYTES NFR BLD: 8.8 % (ref 4–15)
NEUTROPHILS # BLD AUTO: 7.4 K/UL (ref 1.8–7.7)
NEUTROPHILS NFR BLD: 64.7 % (ref 38–73)
NRBC BLD-RTO: 0 /100 WBC
PHOSPHATE SERPL-MCNC: 4.3 MG/DL (ref 2.7–4.5)
PLATELET # BLD AUTO: 165 K/UL (ref 150–450)
PMV BLD AUTO: 10.8 FL (ref 9.2–12.9)
POTASSIUM SERPL-SCNC: 3.7 MMOL/L (ref 3.5–5.1)
RBC # BLD AUTO: 3.55 M/UL (ref 4–5.4)
SODIUM SERPL-SCNC: 142 MMOL/L (ref 136–145)
WBC # BLD AUTO: 11.4 K/UL (ref 3.9–12.7)

## 2023-02-10 PROCEDURE — 25000003 PHARM REV CODE 250: Mod: HCNC,NTX | Performed by: HOSPITALIST

## 2023-02-10 PROCEDURE — 1111F PR DISCHARGE MEDS RECONCILED W/ CURRENT OUTPATIENT MED LIST: ICD-10-PCS | Mod: HCNC,95,CPTII,NTX | Performed by: INTERNAL MEDICINE

## 2023-02-10 PROCEDURE — 25000003 PHARM REV CODE 250: Mod: HCNC,NTX | Performed by: INTERNAL MEDICINE

## 2023-02-10 PROCEDURE — 94799 UNLISTED PULMONARY SVC/PX: CPT | Mod: HCNC,NTX

## 2023-02-10 PROCEDURE — 99239 HOSP IP/OBS DSCHRG MGMT >30: CPT | Mod: HCNC,95,NTX, | Performed by: INTERNAL MEDICINE

## 2023-02-10 PROCEDURE — 85025 COMPLETE CBC W/AUTO DIFF WBC: CPT | Mod: HCNC,NTX | Performed by: INTERNAL MEDICINE

## 2023-02-10 PROCEDURE — 97535 SELF CARE MNGMENT TRAINING: CPT | Mod: HCNC,NTX

## 2023-02-10 PROCEDURE — 80069 RENAL FUNCTION PANEL: CPT | Mod: HCNC,NTX | Performed by: INTERNAL MEDICINE

## 2023-02-10 PROCEDURE — 99239 PR HOSPITAL DISCHARGE DAY,>30 MIN: ICD-10-PCS | Mod: HCNC,95,NTX, | Performed by: INTERNAL MEDICINE

## 2023-02-10 PROCEDURE — 1111F DSCHRG MED/CURRENT MED MERGE: CPT | Mod: HCNC,95,CPTII,NTX | Performed by: INTERNAL MEDICINE

## 2023-02-10 PROCEDURE — 94664 DEMO&/EVAL PT USE INHALER: CPT | Mod: HCNC,NTX

## 2023-02-10 PROCEDURE — 36415 COLL VENOUS BLD VENIPUNCTURE: CPT | Mod: HCNC,NTX | Performed by: INTERNAL MEDICINE

## 2023-02-10 RX ORDER — DOXYLAMINE SUCCINATE 25 MG
TABLET ORAL 2 TIMES DAILY
Qty: 57 G | Refills: 2 | Status: SHIPPED | OUTPATIENT
Start: 2023-02-10 | End: 2023-07-22

## 2023-02-10 RX ORDER — NIFEDIPINE 60 MG/1
60 TABLET, EXTENDED RELEASE ORAL NIGHTLY
Qty: 30 TABLET | Refills: 11 | Status: SHIPPED | OUTPATIENT
Start: 2023-02-10 | End: 2023-03-27 | Stop reason: ALTCHOICE

## 2023-02-10 RX ORDER — FUROSEMIDE 40 MG/1
80 TABLET ORAL 2 TIMES DAILY
Qty: 120 TABLET | Refills: 3 | Status: SHIPPED | OUTPATIENT
Start: 2023-02-10 | End: 2023-11-26

## 2023-02-10 RX ORDER — CARVEDILOL 25 MG/1
25 TABLET ORAL 2 TIMES DAILY WITH MEALS
Qty: 180 TABLET | Refills: 3 | Status: SHIPPED | OUTPATIENT
Start: 2023-02-10 | End: 2023-10-27

## 2023-02-10 RX ORDER — GUAIFENESIN 600 MG/1
600 TABLET, EXTENDED RELEASE ORAL 2 TIMES DAILY PRN
Qty: 20 TABLET | Refills: 0 | COMMUNITY
Start: 2023-02-10 | End: 2023-02-20

## 2023-02-10 RX ORDER — MONTELUKAST SODIUM 5 MG/1
5 TABLET, CHEWABLE ORAL NIGHTLY
Qty: 30 TABLET | Refills: 0 | Status: SHIPPED | OUTPATIENT
Start: 2023-02-10 | End: 2023-05-05

## 2023-02-10 RX ADMIN — FUROSEMIDE 80 MG: 20 TABLET ORAL at 09:02

## 2023-02-10 RX ADMIN — ESCITALOPRAM OXALATE 10 MG: 10 TABLET ORAL at 09:02

## 2023-02-10 RX ADMIN — CARVEDILOL 12.5 MG: 12.5 TABLET, FILM COATED ORAL at 09:02

## 2023-02-10 RX ADMIN — GUAIFENESIN 600 MG: 600 TABLET, EXTENDED RELEASE ORAL at 09:02

## 2023-02-10 RX ADMIN — CHOLECALCIFEROL TAB 125 MCG (5000 UNIT) 5000 UNITS: 125 TAB at 09:02

## 2023-02-10 RX ADMIN — MUPIROCIN: 20 OINTMENT TOPICAL at 09:02

## 2023-02-10 RX ADMIN — MICONAZOLE NITRATE: 20 CREAM TOPICAL at 09:02

## 2023-02-10 RX ADMIN — LEVOTHYROXINE SODIUM 200 MCG: 100 TABLET ORAL at 06:02

## 2023-02-10 NOTE — PROGRESS NOTES
Home Oxygen Evaluation    Date Performed: 2/10/2023    1) Patient's Home O2 Sat on room air, while at rest: 86        If O2 sats on room air at rest are 88% or below, patient qualifies. No additional testing needed. Document N/A in steps 2 and 3. If 89% or above, complete steps 2.      2) Patient's O2 Sat on room air while exercising: na        If O2 sats on room air while exercising remain 89% or above patient does not qualify, no further testing needed Document N/A in step 3. If O2 sats on room air while exercising are 88% or below, continue to step 3.      3) Patient's O2 Sat while exercising on O2: na at na LPM         (Must show improvement from #2 for patients to qualify)    If O2 sats improve on oxygen, patient qualifies for portable oxygen. If not, the patient does not qualify.

## 2023-02-10 NOTE — PLAN OF CARE
[facility] scheduled pcp f/u apopt   Future Appointments   Date Time Provider Department Center   2/17/2023 10:30 AM JEANNE Ybarra II, MD [facility] IM [facility]   3/16/2023  9:00 AM Victorino Luna MD [facility] GASTRO [facility]   3/27/2023  8:30 AM Fred Kelly MD [facility] NEPHRO [facility]

## 2023-02-10 NOTE — PLAN OF CARE
[facility] - [facility] ([place] [address])      HOME HEALTH ORDERS  FACE TO FACE ENCOUNTER    Patient Name: Kathie Quezada  YOB: 1960    PCP: Elan Price MD   PCP Address: [address] / [place] [address]  PCP Phone Number: [phone]  PCP Fax: [phone]    Encounter Date: 2/1/23    Admit to Home Health    Diagnoses:  Active Hospital Problems    Diagnosis  POA    *Acute hypoxemic respiratory failure [J96.01]  Yes    Pulmonary hypertension [I27.20]  Yes    Peanut allergy [Z91.010]  Yes    Metabolic bone disease [M89.8X9]  Yes    Discharge planning  [Z02.9]  Not Applicable    Postoperative fever [R50.82]  Yes    A-V fistula [I77.0]  Yes    Chronic allergic rhinitis [J30.9]  Yes     Chronic    Chronic kidney disease (CKD), stage V [N18.5]  Yes     Chronic    Type 2 diabetes mellitus with stage 5 chronic kidney disease not on chronic dialysis, without long-term current use of insulin [E11.22, N18.5]  Yes     Chronic    Controlled type 2 diabetes mellitus with stable proliferative retinopathy of both eyes, unspecified whether long term insulin use [E11.3553]  Yes     Chronic    Diabetic polyneuropathy associated with type 2 diabetes mellitus [E11.42]  Yes     Chronic    Generalized anxiety disorder [F41.1]  Yes     Chronic    Chronic diastolic heart failure [I50.32]  Yes     Chronic     2-8-19  Mild left atrial enlargement.  Mild left ventricular enlargement.  Normal left ventricular systolic function. The estimated ejection fraction is 55%  Indeterminate left ventricular diastolic function.  Normal right ventricular systolic function.  Mild mitral regurgitation.  Mild to moderate tricuspid regurgitation.  The estimated PA systolic pressure is 32 mm Hg  Normal central venous pressure (3 mm Hg).  Trivial peircumferential pericardial effusion.      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    7-31-19  Factor XI Activity 55 - 145 % 96              Resolved Hospital Problems   No resolved problems to display.       Follow Up Appointments:  Future Appointments   Date Time Provider Department Center   3/16/2023  9:00 AM Victorino Luna MD [facility] GASTRO [address]   3/27/2023  8:30 AM Fred Kelly MD [facility] NEPHRO [facility]       Allergies:  Review of patient's allergies indicates:   Allergen Reactions    Atorvastatin Other (See Comments)     Statin induced myalgia    Mushroom Hives    Peanut Anaphylaxis    Bactrim [sulfamethoxazole-trimethoprim] Hives    Gabapentin      Pruritis     Iodine     Peanut butter flavor     Penicillins Hives    Shellfish containing products Itching    Sulfa (sulfonamide antibiotics) Hives       Medications: Review discharge medications with patient and family and provide education.    Current Facility-Administered Medications   Medication Dose Route Frequency Provider Last Rate Last Admin    acetaminophen tablet 650 mg  650 mg Oral Q6H PRN Elijah Ahuja MD   650 mg at 02/02/23 1718    carvediloL tablet 12.5 mg  12.5 mg Oral BID  Arlene Paul MD   12.5 mg at 02/09/23 1734    cholecalciferol (vitamin D3) 125 mcg (5,000 unit) tablet 5,000 Units  5,000 Units Oral Daily Elan Alarcon MD   5,000 Units at 02/09/23 0817    diphenhydrAMINE capsule 25 mg  25 mg Oral Q6H PRN Fatou Raines NP   25 mg at 02/07/23 2139    EScitalopram oxalate tablet 10 mg  10 mg Oral Daily Elijah Ahuja MD   10 mg at 02/09/23 0816    furosemide tablet 80 mg  80 mg Oral BID Arlene Paul MD   80 mg at 02/09/23 1734    guaiFENesin 12 hr tablet 600 mg  600 mg Oral BID Arlene Paul MD   600 mg at 02/09/23 2104    hydrALAZINE injection 5 mg  5 mg Intravenous Q8H PRN Elan Alarcon MD        levothyroxine tablet 200 mcg  200 mcg Oral Before breakfast Elijah Ahuja MD   200 mcg at 02/10/23 0648    miconazole 2 % cream   Topical (Top) BID  Arlene Paul MD   Given at 02/09/23 2100    montelukast chewable tablet 5 mg  5 mg Oral QHS Elijah RICHAR Ahuja MD   5 mg at 02/09/23 2104    mupirocin 2 % ointment   Nasal BID Arlene Paul MD   Given at 02/09/23 2104    NIFEdipine 24 hr tablet 60 mg  60 mg Oral Nightly Arlene Paul MD   60 mg at 02/09/23 2104    oxyCODONE immediate release tablet 5 mg  5 mg Oral Q4H PRN Elijah Ahuja MD   5 mg at 02/08/23 2023    polyethylene glycol packet 17 g  17 g Oral BID PRN Kisha Crystal MD        senna-docusate 8.6-50 mg per tablet 1 tablet  1 tablet Oral BID PRN Kisha Crystal MD        traZODone tablet 50 mg  50 mg Oral Nightly PRN Elijah Ahuja MD   50 mg at 02/09/23 2109     Current Discharge Medication List        START taking these medications    Details   guaiFENesin (MUCINEX) 600 mg 12 hr tablet Take 1 tablet (600 mg total) by mouth 2 (two) times daily as needed for Congestion.  Qty: 20 tablet, Refills: 0      miconazole (MICOTIN) 2 % cream Apply topically 2 (two) times daily to Bilateral feet  Qty: 57 g, Refills: 2      NIFEdipine (PROCARDIA-XL) 60 MG (OSM) 24 hr tablet Take 1 tablet (60 mg total) by mouth nightly.  Qty: 30 tablet, Refills: 11    Comments: .           CONTINUE these medications which have CHANGED    Details   carvediloL (COREG) 25 MG tablet Take 1 tablet (25 mg total) by mouth 2 (two) times daily with meals.  Qty: 180 tablet, Refills: 3    Comments: .  Associated Diagnoses: Chronic diastolic heart failure; Renovascular hypertension      furosemide (LASIX) 40 MG tablet Take 2 tablets (80 mg total) by mouth 2 (two) times a day.  Qty: 120 tablet, Refills: 3    Associated Diagnoses: Chronic diastolic heart failure; Chronic kidney disease (CKD), stage V      montelukast (SINGULAIR) 5 MG chewable tablet Chew and swallow 1 tablet (5 mg total) by mouth every evening.  Qty: 30 tablet, Refills: 0    Associated Diagnoses: Chronic allergic rhinitis           CONTINUE these medications which  have NOT CHANGED    Details   cholecalciferol, vitamin D3, 125 mcg (5,000 unit) Tab Take 1 tablet (5,000 Units total) by mouth once daily.  Qty: 90 tablet, Refills: 3    Associated Diagnoses: Vitamin D deficiency      EScitalopram oxalate (LEXAPRO) 10 MG tablet Take 1 tablet (10 mg total) by mouth once daily.  Qty: 90 tablet, Refills: 3    Associated Diagnoses: Generalized anxiety disorder      SYNTHROID 200 mcg tablet Take 1 tablet (200 mcg total) by mouth before breakfast.  Qty: 90 tablet, Refills: 3    Associated Diagnoses: Postoperative hypothyroidism      calcitRIOL (ROCALTROL) 0.25 MCG Cap Take 1 capsule (0.25 mcg total) by mouth once daily.  Qty: 90 capsule, Refills: 1    Associated Diagnoses: Secondary hyperparathyroidism of renal origin      levocetirizine (XYZAL) 5 MG tablet Take 1 tablet (5 mg total) by mouth daily as needed for Allergies.  Qty: 90 tablet, Refills: 3    Associated Diagnoses: Chronic allergic rhinitis      oxyCODONE (ROXICODONE) 5 MG immediate release tablet Take 1 tablet (5 mg total) by mouth every 4 (four) hours as needed for Pain.  Qty: 15 tablet, Refills: 0    Comments: Quantity prescribed more than 7 day supply? No      traZODone (DESYREL) 50 MG tablet Take 1 to 2 tablets ( mg total) by mouth nightly as needed for Insomnia.  Qty: 120 tablet, Refills: 3    Associated Diagnoses: Generalized anxiety disorder           STOP taking these medications       amLODIPine (NORVASC) 10 MG tablet Comments:   Reason for Stopping:         sodium bicarbonate 325 MG tablet Comments:   Reason for Stopping:                 I have seen and examined this patient within the last 30 days. My clinical findings that support the need for the home health skilled services and home bound status are the following:no   Weakness/numbness causing balance and gait disturbance due to Heart Failure, Weakness/Debility, and CKD making it taxing to leave home.  Medical restrictions requiring assistance of another  human to leave home due to  Home oxygen requirement.     Diet:   cardiac diet and renal diet    Labs:  SN to perform labs:  CBC: Weekly; 3 week(s) and CMP: Weekly; 3 week(s), Report Lab results to PCP., and Fred Kelly MD (nephrology) fax:[phone]     Referrals/ Consults  Physical Therapy to evaluate and treat. Evaluate for home safety and equipment needs; Establish/upgrade home exercise program. Perform / instruct on therapeutic exercises, gait training, transfer training, and Range of Motion.  Occupational Therapy to evaluate and treat. Evaluate home environment for safety and equipment needs. Perform/Instruct on transfers, ADL training, ROM, and therapeutic exercises.  Aide to provide assistance with personal care, ADLs, and vital signs.    Activities:   activity as tolerated    Nursing:   Agency to admit patient within 24 hours of hospital discharge unless specified on physician order or at patient request    SN to complete comprehensive assessment including routine vital signs. Instruct on disease process and s/s of complications to report to MD. Review/verify medication list sent home with the patient at time of discharge  and instruct patient/caregiver as needed. Frequency may be adjusted depending on start of care date.     Skilled nurse to perform up to 3 visits PRN for symptoms related to diagnosis    Notify MD if SBP > 160 or < 90; DBP > 90 or < 50; HR > 120 or < 50; Temp > 101; O2 < 88%  Ok to schedule additional visits based on staff availability and patient request on consecutive days within the home health episode.    When multiple disciplines ordered:    Start of Care occurs on Sunday - Wednesday schedule remaining discipline evaluations as ordered on separate consecutive days following the start of care.    Thursday SOC -schedule subsequent evaluations Friday and Monday the following week.     Friday - Saturday SOC - schedule subsequent discipline evaluations on consecutive days starting  Monday of the following week.    For all post-discharge communication and subsequent orders please contact patient's primary care physician. If unable to reach primary care physician or do not receive response within 30 minutes, please contact [phone] for clinical staff order clarification    Miscellaneous   Heart Failure:      SN to instruct on the following:    Instruct on the definition of CHF.   Instruct on the signs/sympoms of CHF to be reported.   Instruct on and monitor daily weights.   Instruct on factors that cause exacerbation.   Instruct on action, dose, schedule, and side effects of medications.   Instruct on diet as prescribed.   Instruct on activity allowed.   Instruct on life-style modifications for life long management of CHF   SN to assess compliance with daily weights, diet, medications, fluid retention,    safety precautions, activities permitted and life-style modifications.   Additional 1-2 SN visits per week as needed for signs and symptoms     of CHF exacerbation.      For Weight Gain > 2-3 lbs in 1 day or 4-6 lbs over 1 week notify PCP:      Home Oxygen:  Oxygen at 2 L/min nasal canula to be used:  with activity and nightly., Assess oxygen saturation via pulse oximeter as needed for increase in SOB., Notify physician if oxygen saturation less than 88%, and Consult respiratory therapy for instruction on home oxygen use    Home Health Aide:  Nursing Three times weekly, Physical Therapy Three times weekly, Occupational Therapy Three times weekly, and Home Health Aide Three times weekly    Wound Care Orders  no    I certify that this patient is confined to her home and needs intermittent skilled nursing care, physical therapy, and occupational therapy.      Kisha Crystal MD

## 2023-02-10 NOTE — ASSESSMENT & PLAN NOTE
Diet: Diet diabetic [facility]; 2000 Calorie; Renal; Fluid - 1500mL  Significant LDAs:   IV Access Type: Peripheral and Dialysis Access  Urinary Catheter Indication if present: Patient Does Not Have Urinary Catheter  Other Lines/Tubes/Drains:     Goals of Care:    Previous admission:  1/31/23  Code Status: Full Code  Likely prognosis:  Fair  Advance Care Planning   Date: 02/04/2023  Patient wishes to continue curative therapies and return to previous level of function.      LINDSEY: 2/10/2023     Code Status: Full Code   Is the patient medically ready for discharge?: No    Reason for patient still in hospital (select all that apply): Patient trending condition and Consult recommendations  Discharge Plan A: Home      High Risk Conditions:  Patient has a condition that poses threat to life and bodily function: Severe Respiratory Distress    The amount of time spent during, and associated with, this encounter was 50 minutes; the nature of the activities performed were:  Preparing to see the patient, chart review  Obtaining and/or receiving separately obtained history   Performing medically appropriate examination and evaluation  Counseling and educating the patient/family/caregiver  Ordering medications, tests, or procedures  Referring to and communicating with other health care professionals  Documenting clinical information in the EHR  Care coordination

## 2023-02-10 NOTE — PLAN OF CARE
[facility] ([facility])  Discharge Final Note    Primary Care Provider: Elan Price MD    Expected Discharge Date: 2/10/2023    Final Discharge Note (most recent)       Final Note - 02/10/23 0957          Final Note    Assessment Type Final Discharge Note (P)      Anticipated Discharge Disposition Home-Health Care Svc (P)         Post-Acute Status    Discharge Delays None known at this time (P)                      Important Message from Medicare           [facility] sent referral, [facility] sent referral for home o2    [facility] cannot see pt referral sent to other HH agency will update note when HH is located

## 2023-02-10 NOTE — PLAN OF CARE
Problem: Occupational Therapy  Goal: Occupational Therapy Goal  Description: Goals set on 2/7 with expiration date 2/21:  Patient will increase functional independence with ADLs by performing:    Supine <> Sit with [place]  Grooming while standing at sink with Mod [place].  UB Dressing with Mod [place].  LB Dressing with Mod [place].  Step transfer with Mod [place] with DME as needed.  Pt will demonstrate understanding of education provided regarding energy conservation and task modification through teach-back method.     Outcome: Met

## 2023-02-10 NOTE — ASSESSMENT & PLAN NOTE
Chest X-ray clear. Checked chest CT. No clear etiology found: just increased vascular congestion and atelectasis.   Checked ventilation-perfusion scan to rule out pulmonary embolism; low prob.  Continued Lasix. Wean O2 as tolerated.  No improvement 2/5; Echo completed. Checked CVP, suspect volume overload.  Echo with CVP of 8 and PASP is 58 mmHg; Lasix changed to IV 80 mg BID on 2/6  Improved at rest; dropped to SpO2 of 70% with first walk test; did well with repeat walk test on 2/8  -remains with hypoxia at rest -

## 2023-02-10 NOTE — CARE UPDATE
RAPID RESPONSE NURSE PROACTIVE ROUNDING NOTE       Time of Visit: 0743    Admit Date: 2023  LOS: 9  Code Status: Full Code   Date of Visit: 02/10/2023  : 1960  Age: 63 y.o.  Sex: female  Race: Black or   Bed: 45458/55232 A:   MRN: [id number]  Was the patient discharged from an ICU this admission? No   Was the patient discharged from a PACU within last 24 hours? No   Did the patient receive conscious sedation/general anesthesia in last 24 hours? No  Was the patient in the ED within the past 24 hours? No  Was the patient on NIPPV within the past 24 hours? No   Attending Physician: Kisha Crystal MD  Primary Service: [facility] VIRTUAL TEAM 10   Time spent at the bedside: < 15 min    SITUATION    Notified by  phone call from Ada .  Reason for alert: Documented O2 sats in the 80s  Called to evaluate the patient for Respiratory    BACKGROUND     Why is the patient in the hospital?: Acute hypoxemic respiratory failure    Patient has a past medical history of Acute respiratory failure with hypoxia and hypercapnia, ATN (acute tubular necrosis), Chronic diastolic heart failure, CKD (chronic kidney disease) stage 4, GFR 15-29 ml/min, CKD stage G3a/A3, GFR 45-59 and albumin creatinine ratio >300 mg/g, CKD stage G3b/A3, GFR 30-44 and albumin creatinine ratio >300 mg/g, Closed compression fracture of L2 lumbar vertebra, Closed compression fracture of L2 lumbar vertebra, Controlled type 2 diabetes with retinopathy, COVID-19 virus infection 2021, CVA (cerebral vascular accident), Diabetic polyneuropathy associated with type 2 diabetes mellitus, Encounter for blood transfusion, Essential hypertension, Factor XI deficiency, Gastroesophageal reflux disease without esophagitis, Generalized anxiety disorder, GERD (gastroesophageal reflux disease), GIB (gastrointestinal bleeding), History of CVA (cerebrovascular accident) without residual deficits, History of hepatitis C, Hypertensive retinopathy, bilateral, Mild  nonproliferative diabetic retinopathy, Mixed hyperlipidemia, Myalgia due to statin, Normocytic anemia, NS (nuclear sclerosis), bilateral, PNA (pneumonia), Postoperative hypothyroidism, Renovascular hypertension, S/P kyphoplasty, Slow transit constipation, Type 2 diabetes mellitus with circulatory disorder, with long-term current use of insulin, Type 2 diabetes mellitus with stage 3 chronic kidney disease, without long-term current use of insulin, Type 2 diabetes mellitus with stage 3a chronic kidney disease, without long-term current use of insulin, and Type 2 diabetes mellitus with stage 3b chronic kidney disease, without long-term current use of insulin.    Last Vitals:  Temp: 98.3 °F (36.8 °C) (02/10 0824)  Pulse: 75 (02/10 0824)  Resp: 15 (02/10 0824)  BP: 142/63 (02/10 0824)  SpO2: 94 % (02/10 0824)    24 Hours Vitals Range:  Temp:  [98 °F (36.7 °C)-98.9 °F (37.2 °C)]   Pulse:  [75-78]   Resp:  [15-18]   BP: (115-157)/(58-74)   SpO2:  [82 %-96 %]     Labs:  Recent Labs     02/08/23  0250 02/10/23  0521   WBC 10.80 11.40   HGB 9.0* 8.6*   HCT 28.7* 28.9*    165       Recent Labs     02/08/23  0250 02/09/23  1106 02/10/23  0521    142 142   K 3.9 4.3 3.7    102 104   CO2 28 25 25   CREATININE 4.0* 3.9* 4.0*    141* 80   PHOS 3.4 4.1 4.3        No results for input(s): PH, PCO2, PO2, HCO3, POCSATURATED, BE in the last 72 hours.     ASSESSMENT    Physical Exam  Vitals and nursing note reviewed.   Cardiovascular:      Rate and Rhythm: Normal rate.   Pulmonary:      Effort: Pulmonary effort is normal.   Neurological:      Mental Status: She is alert.     Called o bedside to assess patients O2 sats. On arrival to bedside, patient AAOx4, sitting on edge of bed. Patient on RA, in no apparent distress. VS taken, charted in flowsheets.     INTERVENTIONS    The patient was seen for Respiratory problem. Staff concerns included decreased O2 sats. The following interventions were performed:  NA.    RECOMMENDATIONS    POC per primary    PROVIDER ESCALATION    Yes/No  no    Orders received and case discussed with NA.    Disposition: Remain in room 00944.    FOLLOW-UP    Charge RNMaricel  updated on plan of care. Instructed to call the Rapid Response Nurse, Christina Leger RN at [phone] for additional questions or concerns.

## 2023-02-10 NOTE — ASSESSMENT & PLAN NOTE
Vascular Surgery evaluated it since she had a procedure (basilic vein transposition) prior to developing shortness of breath and fever.  Doubt infection at this time; remains afebrile.

## 2023-02-10 NOTE — SUBJECTIVE & OBJECTIVE
This follow-up encounter was provided through telemedicine to address  Acute hypoxemic respiratory failure present on admission.  Patient was transferred to the telemedicine service on:  02/09/2023   The patient location is: [id number] A admitted 2/1/2023 12:25 PM.      Interval History/Overnight Events:   Clinical record since admit reviewed.    Patient is able to provide adequate history.    Patient states breathing is improved and she is more mobile than at admit; her lower extremity edema has also improved significantly; patient passed 6 minute walk test yesterday but still is documented to have a pulse ox of 85% on room air today    Review of Systems   Constitutional:  Positive for activity change and fatigue. Negative for fever.   Respiratory:  Negative for shortness of breath.    Cardiovascular:  Positive for leg swelling.        I have reviewed the following on 02/09/2023:     Details     [x]   Lab results  Cr stable at 3.9    []   Micro reports     []   Pathology reports     []   Imaging reports     []   Cardiology Procedure reports     []   Outside records/CareEverywhere     []  Independently viewed:         Inpatient Medications reviewed and prescribed for management of current problems:  Scheduled Meds:   carvediloL  12.5 mg Oral BID WM    cholecalciferol (vitamin D3)  5,000 Units Oral Daily    EScitalopram oxalate  10 mg Oral Daily    furosemide  80 mg Oral BID    guaiFENesin  600 mg Oral BID    levothyroxine  200 mcg Oral Before breakfast    miconazole   Topical (Top) BID    montelukast  5 mg Oral QHS    mupirocin   Nasal BID    NIFEdipine  60 mg Oral Nightly     Continuous Infusions:  PRN Meds:.acetaminophen, diphenhydrAMINE, hydrALAZINE, oxyCODONE, polyethylene glycol, senna-docusate 8.6-50 mg, traZODone      Objective:     Temp:  [98.2 °F (36.8 °C)-98.4 °F (36.9 °C)] 98.2 °F (36.8 °C)  Pulse:  [75-78] 78  Resp:  [17-20] 18  SpO2:  [85 %-95 %] 95 %  BP: (107-158)/(54-65) 124/62      Intake/Output  Summary (Last 24 hours) at 2/9/2023 1948  Last data filed at 2/9/2023 1700  Gross per 24 hour   Intake 1320 ml   Output 700 ml   Net 620 ml        Body mass index is 36.02 kg/m².    Physical Exam  Vitals and nursing note reviewed.   Constitutional:       General: She is not in acute distress.     Appearance: Normal appearance.   HENT:      Head: Normocephalic and atraumatic.   Eyes:      Extraocular Movements: Extraocular movements intact.   Cardiovascular:      Rate and Rhythm: Normal rate.   Pulmonary:      Effort: Pulmonary effort is normal. No tachypnea or respiratory distress.   Neurological:      General: No focal deficit present.      Mental Status: She is alert and oriented to person, place, and time.      Cranial Nerves: No cranial nerve deficit.      Motor: No weakness.   Psychiatric:         Attention and Perception: Attention normal.         Mood and Affect: Mood and affect normal.         Speech: Speech normal.         Behavior: Behavior is cooperative.        Labs: All labs within the last 24 hours were reviewed.   Recent Results (from the past 24 hour(s))   POCT TB Skin Test Read    Collection Time: 02/09/23 11:04 AM   Result Value Ref Range    TB Induration 48 - 72 hr read 0 mm   Renal function panel    Collection Time: 02/09/23 11:06 AM   Result Value Ref Range    Glucose 141 (H) 70 - 110 mg/dL    Sodium 142 136 - 145 mmol/L    Potassium 4.3 3.5 - 5.1 mmol/L    Chloride 102 95 - 110 mmol/L    CO2 25 23 - 29 mmol/L    BUN 45 (H) 8 - 23 mg/dL    Calcium 9.3 8.7 - 10.5 mg/dL    Creatinine 3.9 (H) 0.5 - 1.4 mg/dL    Albumin 3.3 (L) 3.5 - 5.2 g/dL    Phosphorus 4.1 2.7 - 4.5 mg/dL    eGFR 12.5 (A) >60 mL/min/1.73 m^2    Anion Gap 15 8 - 16 mmol/L        Lab Results   Component Value Date    [id number] Not Detected 02/04/2023       Recent Labs   Lab 02/06/23  0431 02/07/23  0853 02/08/23  0250   WBC 9.57 10.87 10.80   LYMPH 16.8*  1.6  --  15.7*  1.7   HGB 8.4* 9.9* 9.0*   HCT 27.6* 31.7* 28.7*   PLT  151 197 173     Recent Labs   Lab 02/04/23  0556 02/05/23  1001 02/07/23  0853 02/08/23  0250 02/09/23  1106   *   < > 138 140 142   K 4.4   < > 4.1 3.9 4.3      < > 102 100 102   CO2 16*   < > 24 28 25   BUN 47*   < > 46* 54* 45*   CREATININE 4.2*   < > 4.0* 4.0* 3.9*   GLU 81   < > 103 109 141*   CALCIUM 8.2*   < > 8.7 8.6* 9.3   MG 1.8  --   --   --   --    PHOS 5.5*   < > 3.6 3.4 4.1    < > = values in this interval not displayed.     Recent Labs   Lab 02/07/23  0853 02/08/23  0250 02/09/23  1106   ALBUMIN 3.2* 3.0* 3.3*        No results for input(s): DDIMER, FERRITIN, CRP, LDH, BNP, TROPONINI, CPK in the last 72 hours.    Invalid input(s): PROCALCITONIN        Microbiology: All microbiology updates for the past 24 hours have been reviewed.  Microbiology Results (last 7 days)       Procedure Component Value Units Date/Time    Blood culture [[id number]] Collected: 02/04/23 0556    Order Status: Completed Specimen: Blood from Antecubital, Right Hand Updated: 02/09/23 0812     Blood Culture, Routine No growth after 5 days.    Blood Culture #2 **CANNOT BE ORDERED STAT** [[id number]] Collected: 02/01/23 1438    Order Status: Completed Specimen: Blood from Peripheral, Antecubital, Right Updated: 02/06/23 1612     Blood Culture, Routine No growth after 5 days.    Blood Culture #1 **CANNOT BE ORDERED STAT** [[id number]] Collected: 02/01/23 1438    Order Status: Completed Specimen: Blood from Peripheral, Antecubital, Right Updated: 02/06/23 1612     Blood Culture, Routine No growth after 5 days.    Urine culture [[id number]] Collected: 02/01/23 1604    Order Status: Completed Specimen: Urine Updated: 02/02/23 2323     Urine Culture, Routine No growth    Narrative:      Specimen Source->Urine              Imaging All imaging within the last 24 hours was reviewed.   ECG Results              EKG 12-lead (Final result)  Result time 02/01/23 15:13:56      Final result by Interface, Lab In [facility] (02/01/23 15:13:56)                    Narrative:    Test Reason : R06.02,    Vent. Rate : 076 BPM     Atrial Rate : 076 BPM     P-R Int : 196 ms          QRS Dur : 092 ms      QT Int : 394 ms       P-R-T Axes : 051 044 042 degrees     QTc Int : 443 ms    Normal sinus rhythm  Low anterior forces  Abnormal ECG  When compared with ECG of 31-JAN-2023 13:45,  No significant change was found  Confirmed by Mark TREJO MD ([id number]) on 2/1/2023 3:13:50 PM    Referred By: [email or web address]   SELF           Confirmed By:Mark TREJO MD                                    Results for orders placed during the hospital encounter of 02/01/23    Echo    Interpretation Summary  · Moderate left atrial enlargement.  · The left ventricle is normal in size with concentric hypertrophy and normal systolic function.  · Left ventricular diastolic dysfunction.  · Normal right ventricular size with normal right ventricular systolic function.  · Mild mitral regurgitation.  · Moderate tricuspid regurgitation.  · Intermediate central venous pressure (8 mmHg).  · There is pulmonary hypertension. The estimated PA systolic pressure is 58 mmHg.  · Small lateral pericardial effusion.  · The estimated PA systolic pressure is 58 mmHg.      Echo  · Moderate left atrial enlargement.  · The left ventricle is normal in size with concentric hypertrophy and   normal systolic function.  · Left ventricular diastolic dysfunction.  · Normal right ventricular size with normal right ventricular systolic   function.  · Mild mitral regurgitation.  · Moderate tricuspid regurgitation.  · Intermediate central venous pressure (8 mmHg).  · There is pulmonary hypertension. The estimated PA systolic pressure is   58 mmHg.  · Small lateral pericardial effusion.  · The estimated PA systolic pressure is 58 mmHg.

## 2023-02-10 NOTE — PLAN OF CARE
Problem: Adult Inpatient Plan of Care  Goal: Plan of Care Review  Outcome: Met  Goal: Patient-Specific Goal (Individualized)  Outcome: Met  Goal: Absence of Hospital-Acquired Illness or Injury  Outcome: Met  Goal: Optimal Comfort and Wellbeing  Outcome: Met  Goal: Readiness for Transition of Care  Outcome: Met     Problem: Diabetes Comorbidity  Goal: Blood Glucose Level Within Targeted Range  Outcome: Met     Problem: Fall Injury Risk  Goal: Absence of Fall and Fall-Related Injury  Outcome: Met     Problem: Gas Exchange Impaired  Goal: Optimal Gas Exchange  Outcome: Met

## 2023-02-10 NOTE — PT/OT/SLP PROGRESS
Occupational Therapy   Treatment and Discharge note    Name: Kathie Quezada  MRN: [id number]  Admitting Diagnosis:  Acute hypoxemic respiratory failure       Recommendations:     Discharge Recommendations: home (no OT needs)  Discharge Equipment Recommendations:  walker, rolling  Barriers to discharge:  None    Assessment:     Kathie Quezada is a 63 y.o. female with a medical diagnosis of Acute hypoxemic respiratory failure. At this time, pt is completing all ADLs, fxnl mobility, and fxnl tasks of choice w/ SUP and does not demo the need for skilled OT services acutely. Please reconsult should pt demo a fxnl decline or req INC assist w/ self care tasks.     Plan:     Patient to be seen 3 x/week to address the above listed problems via self-care/home management, therapeutic activities, therapeutic exercises  Plan of Care Expires: 03/09/23  Plan of Care Reviewed with: patient    Subjective     Pain/Comfort:  Pain Rating 1: 0/10  Pain Rating Post-Intervention 1: 0/10    Objective:     Communicated with: nurse prior to session.  Patient found up in chair with  (no lines intact) upon OT entry to room.    General Precautions: Standard, fall    Orthopedic Precautions:N/A  Braces: N/A  Respiratory Status: Room air    Functional Mobility/Transfers:  Patient completed Sit <> Stand Transfer with supervision  with  no assistive device   Patient completed Chair <> Mat Step Transfer technique with supervision with no assistive device  Patient completed Toilet Transfer Step Transfer technique with supervision with  no AD  Functional Mobility: Pt engaged in functional mobility to simulate household/community distances with supervision w/ no AD  in order to maximize functional activity tolerance required for engagement in occupations of choice.    Activities of Daily Living:  Feeding:  independence    Grooming: independence while standing at sink side to wash hands  Toileting: independence seated on commode in  bathroom      [facility] 6 Click ADL: 24    Treatment & Education:  Pt educated on role of OT, POC, and goals for therapy.    POC was dicussed with patient/caregiver, who was included in its development and is in agreement with the identified goals and treatment plan.   Patient and family aware of patient's deficits and therapy progression.   Time provided for therapeutic counseling and discussion of health disposition.   Educated on importance of EOB/OOB mobility, maintaining routine, sitting up in chair, and maximizing independence with ADLs during admission   Pt completed ADLs and functional mobility for treatment session as noted above   Pt/caregiver verbalized understanding and expressed no further concerns/questions.      Patient left up in chair with all lines intact and call button in reach    GOALS:   Multidisciplinary Problems       Occupational Therapy Goals       Not on file              Multidisciplinary Problems (Resolved)          Problem: Occupational Therapy    Goal Priority Disciplines Outcome Interventions   Occupational Therapy Goal   (Resolved)     OT, PT/OT Met    Description: Goals set on 2/7 with expiration date 2/21:  Patient will increase functional independence with ADLs by performing:    Supine <> Sit with [place]  Grooming while standing at sink with Mod [place].  UB Dressing with Mod [place].  LB Dressing with Mod [place].  Step transfer with Mod [place] with DME as needed.  Pt will demonstrate understanding of education provided regarding energy conservation and task modification through teach-back method.                          Time Tracking:     OT Date of Treatment: 02/10/23  OT Start Time: 1243  OT Stop Time: 1255  OT Total Time (min): 12 min    Billable Minutes:Self Care/Home Management 12    OT/DARA: OT     DARA Visit Number: 0    2/10/2023

## 2023-02-10 NOTE — PROGRESS NOTES
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 She lives in [facility] by herself. She is . Her primary care physician is Dr. Elan Price. Her nephrologist is Dr. Fred Kelly.    She had a left arm AV fistula creation on 10/27/2022. She had balloon angioplasty of the fistula on 12/16/2022. She had transposition of the fistula on 1/31/2023. After the procedure, she felt short of breath and developed fever (measured as 101° Fahrenheit at home the next morning). Shortness of breath was not worsened or relieved by positional changes. She contacted the vascular surgeon, who advised her to go to the emergency department. She went to [facility]. She reported lower extremity edema, memory changes, confusion, poor appetite, and nausea that had been going on for a long time prior to the procedure. She was found to have hypoxia with oxygen saturation of 88%. Temperature was 101° Fahrenheit. [place]  [place] 23810/uL. Chest X-ray showed clear lungs. Influenza, RSV, and COVID-19 were negative. She was admitted to Hospital Medicine Team B.      Overview/Hospital Course:  No notes on file      This follow-up encounter was provided through telemedicine to address  Acute hypoxemic respiratory failure present on admission.  Patient was transferred to the telemedicine service on:  02/09/2023   The patient location is: [facility] admitted 2/1/2023 12:25 PM.      Interval History/Overnight Events:   Clinical record since admit reviewed.    Patient is able to provide adequate history.    Patient states breathing is improved and she is more mobile than at admit; her lower extremity edema has also improved significantly; patient passed 6 minute walk test yesterday but still is documented to have a pulse ox of 85% on room air today    Review of Systems   Constitutional:  Positive for activity change and fatigue. Negative for fever.   Respiratory:  Negative for shortness of breath.    Cardiovascular:  Positive for leg swelling.        I have reviewed the following on 02/09/2023:     Details     [x]   Lab results  Cr stable at 3.9    []   Micro reports     []   Pathology reports     []   Imaging reports     []   Cardiology Procedure reports     []   Outside records/CareEverywhere     []  Independently viewed:         Inpatient Medications reviewed and prescribed for management of current problems:  Scheduled Meds:   carvediloL  12.5 mg Oral BID WM    cholecalciferol (vitamin D3)  5,000 Units Oral Daily    EScitalopram oxalate  10 mg Oral Daily    furosemide  80 mg Oral BID    guaiFENesin  600 mg Oral BID    levothyroxine  200 mcg Oral Before breakfast    miconazole   Topical (Top) BID    montelukast  5 mg Oral QHS    mupirocin   Nasal BID    NIFEdipine  60 mg Oral Nightly     Continuous Infusions:  PRN Meds:.acetaminophen, diphenhydrAMINE, hydrALAZINE, oxyCODONE, polyethylene glycol, senna-docusate 8.6-50 mg, traZODone       Objective:     Temp:  [98.2 °F (36.8 °C)-98.4 °F (36.9 °C)] 98.2 °F (36.8 °C)  Pulse:  [75-78] 78  Resp:  [17-20] 18  SpO2:  [85 %-95 %] 95 %  BP: (107-158)/(54-65) 124/62      Intake/Output Summary (Last 24 hours) at 2/9/2023 1948  Last data filed at 2/9/2023 1700  Gross per 24 hour   Intake 1320 ml   Output 700 ml   Net 620 ml        Body mass index is 36.02 kg/m².    Physical Exam  Vitals and nursing note reviewed.   Constitutional:       General: She is not in acute distress.     Appearance: Normal appearance.   HENT:      Head: Normocephalic and atraumatic.   Eyes:      Extraocular Movements: Extraocular movements intact.   Cardiovascular:      Rate and Rhythm: Normal rate.   Pulmonary:      Effort: Pulmonary effort is normal. No tachypnea or respiratory distress.   Neurological:      General: No focal deficit present.      Mental Status: She is alert and oriented to person, place, and time.      Cranial Nerves: No cranial nerve deficit.      Motor: No weakness.   Psychiatric:         Attention and Perception: Attention normal.         Mood and Affect: Mood and affect normal.         Speech: Speech normal.         Behavior: Behavior is cooperative.        Labs: All labs within the last 24 hours were reviewed.   Recent Results (from the past 24 hour(s))   POCT TB Skin Test Read    Collection Time: 02/09/23 11:04 AM   Result Value Ref Range    TB Induration 48 - 72 hr read 0 mm   Renal function panel    Collection Time: 02/09/23 11:06 AM   Result Value Ref Range    Glucose 141 (H) 70 - 110 mg/dL    Sodium 142 136 - 145 mmol/L    Potassium 4.3 3.5 - 5.1 mmol/L    Chloride 102 95 - 110 mmol/L    CO2 25 23 - 29 mmol/L    BUN 45 (H) 8 - 23 mg/dL    Calcium 9.3 8.7 - 10.5 mg/dL    Creatinine 3.9 (H) 0.5 - 1.4 mg/dL    Albumin 3.3 (L) 3.5 - 5.2 g/dL    Phosphorus 4.1 2.7 - 4.5 mg/dL    eGFR 12.5 (A) >60 mL/min/1.73 m^2    Anion Gap 15 8 - 16 mmol/L        Lab Results   Component Value Date    [id number] Not  Detected 02/04/2023       Recent Labs   Lab 02/06/23  0431 02/07/23  0853 02/08/23  0250   WBC 9.57 10.87 10.80   LYMPH 16.8*  1.6  --  15.7*  1.7   HGB 8.4* 9.9* 9.0*   HCT 27.6* 31.7* 28.7*    197 173     Recent Labs   Lab 02/04/23  0556 02/05/23  1001 02/07/23  0853 02/08/23  0250 02/09/23  1106   *   < > 138 140 142   K 4.4   < > 4.1 3.9 4.3      < > 102 100 102   CO2 16*   < > 24 28 25   BUN 47*   < > 46* 54* 45*   CREATININE 4.2*   < > 4.0* 4.0* 3.9*   GLU 81   < > 103 109 141*   CALCIUM 8.2*   < > 8.7 8.6* 9.3   MG 1.8  --   --   --   --    PHOS 5.5*   < > 3.6 3.4 4.1    < > = values in this interval not displayed.     Recent Labs   Lab 02/07/23  0853 02/08/23  0250 02/09/23  1106   ALBUMIN 3.2* 3.0* 3.3*        No results for input(s): DDIMER, FERRITIN, CRP, LDH, BNP, TROPONINI, CPK in the last 72 hours.    Invalid input(s): PROCALCITONIN        Microbiology: All microbiology updates for the past 24 hours have been reviewed.  Microbiology Results (last 7 days)       Procedure Component Value Units Date/Time    Blood culture [[id number]] Collected: 02/04/23 0556    Order Status: Completed Specimen: Blood from Antecubital, Right Hand Updated: 02/09/23 0812     Blood Culture, Routine No growth after 5 days.    Blood Culture #2 **CANNOT BE ORDERED STAT** [[id number]] Collected: 02/01/23 1438    Order Status: Completed Specimen: Blood from Peripheral, Antecubital, Right Updated: 02/06/23 1612     Blood Culture, Routine No growth after 5 days.    Blood Culture #1 **CANNOT BE ORDERED STAT** [[id number]] Collected: 02/01/23 1438    Order Status: Completed Specimen: Blood from Peripheral, Antecubital, Right Updated: 02/06/23 1612     Blood Culture, Routine No growth after 5 days.    Urine culture [[id number]] Collected: 02/01/23 1604    Order Status: Completed Specimen: Urine Updated: 02/02/23 2323     Urine Culture, Routine No growth    Narrative:      Specimen Source->Urine              Imaging All  imaging within the last 24 hours was reviewed.   ECG Results              EKG 12-lead (Final result)  Result time 02/01/23 15:13:56      Final result by Interface, Lab In [facility] (02/01/23 15:13:56)                   Narrative:    Test Reason : R06.02,    Vent. Rate : 076 BPM     Atrial Rate : 076 BPM     P-R Int : 196 ms          QRS Dur : 092 ms      QT Int : 394 ms       P-R-T Axes : 051 044 042 degrees     QTc Int : 443 ms    Normal sinus rhythm  Low anterior forces  Abnormal ECG  When compared with ECG of 31-JAN-2023 13:45,  No significant change was found  Confirmed by Mark TREJO MD ([id number]) on 2/1/2023 3:13:50 PM    Referred By: [email or web address]   SELF           Confirmed By:Mark TREJO MD                                    Results for orders placed during the hospital encounter of 02/01/23    Echo    Interpretation Summary  · Moderate left atrial enlargement.  · The left ventricle is normal in size with concentric hypertrophy and normal systolic function.  · Left ventricular diastolic dysfunction.  · Normal right ventricular size with normal right ventricular systolic function.  · Mild mitral regurgitation.  · Moderate tricuspid regurgitation.  · Intermediate central venous pressure (8 mmHg).  · There is pulmonary hypertension. The estimated PA systolic pressure is 58 mmHg.  · Small lateral pericardial effusion.  · The estimated PA systolic pressure is 58 mmHg.      Echo  · Moderate left atrial enlargement.  · The left ventricle is normal in size with concentric hypertrophy and   normal systolic function.  · Left ventricular diastolic dysfunction.  · Normal right ventricular size with normal right ventricular systolic   function.  · Mild mitral regurgitation.  · Moderate tricuspid regurgitation.  · Intermediate central venous pressure (8 mmHg).  · There is pulmonary hypertension. The estimated PA systolic pressure is   58 mmHg.  · Small lateral pericardial effusion.  · The estimated PA systolic pressure is 58 mmHg.                  Assessment/Plan:      * Acute hypoxemic respiratory failure  Chest X-ray clear. Checked chest CT. No clear etiology found: just increased vascular congestion and atelectasis.   Checked ventilation-perfusion scan to rule out pulmonary embolism; low prob.  Continued Lasix. Wean O2 as tolerated.  No improvement 2/5; Echo completed. Checked CVP, suspect volume overload.  Echo with CVP of 8 and PASP is 58 mmHg; Lasix changed to IV 80 mg BID on 2/6  Improved at rest; dropped to SpO2 of 70% with first walk test; did well with repeat walk test on 2/8  -remains with hypoxia at rest -     Pulmonary hypertension  Patient presenting with hypoxia and PASP of 58  Follow up with Cardiology; OP referral placed.    Discharge planning   Diet: Diet diabetic [facility]; 2000 Calorie; Renal; Fluid - 1500mL  Significant LDAs:   IV Access Type: Peripheral and Dialysis Access  Urinary Catheter Indication if present: Patient Does Not Have Urinary Catheter  Other Lines/Tubes/Drains:     Goals of Care:    Previous admission:  1/31/23  Code Status: Full Code  Likely prognosis:  Fair  Advance Care Planning   Date: 02/04/2023  Patient wishes to continue curative therapies and return to previous level of function.     LINDSEY: 2/10/2023     Code Status: Full Code   Is the patient medically ready for discharge?: No    Reason for patient still in hospital (select all that apply): Patient trending condition and Consult recommendations  Discharge Plan A: Home      High Risk Conditions:  Patient has a condition that poses threat to life and bodily function: Severe Respiratory Distress    The amount of time spent during, and associated with, this encounter was 50 minutes; the nature of the activities performed were:  Preparing to see the patient, chart review  Obtaining and/or receiving separately obtained history   Performing medically appropriate examination and evaluation  Counseling and educating the  patient/family/caregiver  Ordering medications, tests, or procedures  Referring to and communicating with other health care professionals  Documenting clinical information in the EHR  Care coordination    Metabolic bone disease  Started on phosphate binder; then discontinued  Phos level WNL  Follow up with Nephrology    Peanut allergy        Postoperative fever  No clear etiology  Blood cultures collected. Monitor. Follow up cultures - negative.  Remains afebrile.    A-V fistula  Vascular Surgery evaluated it since she had a procedure (basilic vein transposition) prior to developing shortness of breath and fever.  Doubt infection at this time; remains afebrile.    Chronic allergic rhinitis  Continue home montelukast. She takes levocetirizine.    Chronic kidney disease (CKD), stage V  She reports symptoms indicative of this including chronic edema, nausea, confusion.   Has AV fistula but has not yet started dialysis. Continued sodium bicarbonate, calcitriol.  Consulted Nephrology:  - increased bicarb to 1300 TID, added sevelamer 800 TID - both discontinued by Nephrology on 2/8  - home dose Lasix 40 PO BID  - changed amlodipine to nifedipine; resumed Coreg  - Can hold calcitriol while inpatient  Trial of diuresis for hypoxia was effective; no indication for HD at this time.  - discharge with Lasix 80 mg po BID, follow up with Nephrology    Type 2 diabetes mellitus with stage 5 chronic kidney disease not on chronic dialysis, without long-term current use of insulin        Controlled type 2 diabetes mellitus with stable proliferative retinopathy of both eyes, unspecified whether long term insulin use  Diet controlled.    Diabetic polyneuropathy associated with type 2 diabetes mellitus  Reportedly allergic to Neurontin    Generalized anxiety disorder  Continue home escitalopram.    Factor XI deficiency  Noted.     Chronic diastolic heart failure  Continued home furosemide.  Changed Lasix to 80 mg IV BID on 2/6  Continued  current management with IV Lasix BID  Changed to oral Lasix on 2/8 PM  Needs to adhere for fluid restriction and oral Lasix BID at discharge    Renovascular hypertension  She takes amlodipine and carvedilol.   Changing amlodipine to nifedipine; added Coreg at lower dose, monitoring HR.  Increased Coreg on 2/5; BP improving.    Postoperative hypothyroidism  Continue home levothyroxine.      VTE Risk Mitigation (From admission, onward)         Ordered     Place sequential compression device  Until discontinued         02/02/23 2012                  I have completed this tele-visit without the assistance of a telepresenter.    The attending portion of this evaluation, treatment, and documentation was performed per Kisha Crystal MD via Telemedicine AudioVisual using the secure [employer] software platform with 2 way audio/video. The provider was located off-site and the patient is located in the hospital. The aforementioned video software was utilized to document the relevant history and physical exam    Kisha Crystal MD  Department of Hospital Medicine   [facility] - Intensive Care ([place]-[facility])

## 2023-02-13 ENCOUNTER — PATIENT OUTREACH (OUTPATIENT)
Dept: ADMINISTRATIVE | Facility: CLINIC | Age: 63
End: 2023-02-13
Payer: MEDICARE

## 2023-02-13 ENCOUNTER — PATIENT MESSAGE (OUTPATIENT)
Dept: ADMINISTRATIVE | Facility: CLINIC | Age: 63
End: 2023-02-13
Payer: MEDICARE

## 2023-02-13 NOTE — PROGRESS NOTES
C3 nurse attempted to contact Kathie Quezada for a TCC post hospital discharge follow up call. No answer. No voicemail available. The patient has a scheduled HOSFU appointment with JJ VELIZ on 2/17/23 @ 1030.

## 2023-02-13 NOTE — PROGRESS NOTES
C3 nurse spoke with Kathie Quezada for a TCC post hospital discharge follow up call. The patient has a scheduled [facility] appointment with JJ VELIZ on 2/17/23 @ 1030.

## 2023-02-14 ENCOUNTER — TELEPHONE (OUTPATIENT)
Dept: TRANSPLANT | Facility: CLINIC | Age: 63
End: 2023-02-14
Payer: MEDICARE

## 2023-02-14 NOTE — TELEPHONE ENCOUNTER
2/14/23 Chart Reviewed: Delilah Scherer NP-C : 1/4 Hemonc completed with recs, 1/12 GYN/PAP completed, 1/27 cleared by hepatology . (CE)  COLONOSCOPY 11/13/2015 Dr Chino Herbert - brian prep. internal hemorrhoids. diverticulosis of sigmois. NO polyps. repeat due 2025 , MMG completed 7/12/22--OK TO PREPARE FOR SELECTION

## 2023-02-16 DIAGNOSIS — I77.0 ARTERIOVENOUS FISTULA: Primary | ICD-10-CM

## 2023-02-17 ENCOUNTER — OFFICE VISIT (OUTPATIENT)
Dept: INTERNAL MEDICINE | Facility: CLINIC | Age: 63
End: 2023-02-17
Payer: MEDICARE

## 2023-02-17 VITALS
SYSTOLIC BLOOD PRESSURE: 140 MMHG | HEIGHT: 67 IN | HEART RATE: 75 BPM | OXYGEN SATURATION: 95 % | DIASTOLIC BLOOD PRESSURE: 70 MMHG | WEIGHT: 242 LBS | BODY MASS INDEX: 37.98 KG/M2

## 2023-02-17 DIAGNOSIS — Z09 HOSPITAL DISCHARGE FOLLOW-UP: Primary | ICD-10-CM

## 2023-02-17 DIAGNOSIS — Z87.09 HISTORY OF ACUTE RESPIRATORY FAILURE: ICD-10-CM

## 2023-02-17 PROCEDURE — 99999 PR PBB SHADOW E&M-EST. PATIENT-LVL IV: ICD-10-PCS | Mod: PBBFAC,HCNC,, | Performed by: INTERNAL MEDICINE

## 2023-02-17 PROCEDURE — 3044F HG A1C LEVEL LT 7.0%: CPT | Mod: HCNC,CPTII,S$GLB, | Performed by: INTERNAL MEDICINE

## 2023-02-17 PROCEDURE — 3008F BODY MASS INDEX DOCD: CPT | Mod: HCNC,CPTII,S$GLB, | Performed by: INTERNAL MEDICINE

## 2023-02-17 PROCEDURE — 3078F PR MOST RECENT DIASTOLIC BLOOD PRESSURE < 80 MM HG: ICD-10-PCS | Mod: HCNC,CPTII,S$GLB, | Performed by: INTERNAL MEDICINE

## 2023-02-17 PROCEDURE — 3008F PR BODY MASS INDEX (BMI) DOCUMENTED: ICD-10-PCS | Mod: HCNC,CPTII,S$GLB, | Performed by: INTERNAL MEDICINE

## 2023-02-17 PROCEDURE — 3044F PR MOST RECENT HEMOGLOBIN A1C LEVEL <7.0%: ICD-10-PCS | Mod: HCNC,CPTII,S$GLB, | Performed by: INTERNAL MEDICINE

## 2023-02-17 PROCEDURE — 99495 TRANSJ CARE MGMT MOD F2F 14D: CPT | Mod: HCNC,S$GLB,, | Performed by: INTERNAL MEDICINE

## 2023-02-17 PROCEDURE — 3066F NEPHROPATHY DOC TX: CPT | Mod: HCNC,CPTII,S$GLB, | Performed by: INTERNAL MEDICINE

## 2023-02-17 PROCEDURE — 1159F MED LIST DOCD IN RCRD: CPT | Mod: HCNC,CPTII,S$GLB, | Performed by: INTERNAL MEDICINE

## 2023-02-17 PROCEDURE — 3077F SYST BP >= 140 MM HG: CPT | Mod: HCNC,CPTII,S$GLB, | Performed by: INTERNAL MEDICINE

## 2023-02-17 PROCEDURE — 99495 TCM SERVICES (MODERATE COMPLEXITY): ICD-10-PCS | Mod: HCNC,S$GLB,, | Performed by: INTERNAL MEDICINE

## 2023-02-17 PROCEDURE — 99999 PR PBB SHADOW E&M-EST. PATIENT-LVL IV: CPT | Mod: PBBFAC,HCNC,, | Performed by: INTERNAL MEDICINE

## 2023-02-17 PROCEDURE — 3078F DIAST BP <80 MM HG: CPT | Mod: HCNC,CPTII,S$GLB, | Performed by: INTERNAL MEDICINE

## 2023-02-17 PROCEDURE — 1159F PR MEDICATION LIST DOCUMENTED IN MEDICAL RECORD: ICD-10-PCS | Mod: HCNC,CPTII,S$GLB, | Performed by: INTERNAL MEDICINE

## 2023-02-17 PROCEDURE — 1160F PR REVIEW ALL MEDS BY PRESCRIBER/CLIN PHARMACIST DOCUMENTED: ICD-10-PCS | Mod: HCNC,CPTII,S$GLB, | Performed by: INTERNAL MEDICINE

## 2023-02-17 PROCEDURE — 3077F PR MOST RECENT SYSTOLIC BLOOD PRESSURE >= 140 MM HG: ICD-10-PCS | Mod: HCNC,CPTII,S$GLB, | Performed by: INTERNAL MEDICINE

## 2023-02-17 PROCEDURE — 3066F PR DOCUMENTATION OF TREATMENT FOR NEPHROPATHY: ICD-10-PCS | Mod: HCNC,CPTII,S$GLB, | Performed by: INTERNAL MEDICINE

## 2023-02-17 PROCEDURE — 1160F RVW MEDS BY RX/DR IN RCRD: CPT | Mod: HCNC,CPTII,S$GLB, | Performed by: INTERNAL MEDICINE

## 2023-02-17 NOTE — PROGRESS NOTES
HOSPITAL FOLLOWUP NOTE    Discharge note information (in italics) was reviewed in detail and discussed with the patient:   A discharge note is not available. All progress notes from hospital stay were reviewed and discussed.    PMFSH: all information reviewed and updated as necessary during this encounter.  Medication list reviewed and reconciled.    She feels back to normal.  Her pulse ox at home has been in the mid to high 90s consistently at rest and after activity. She has not used her oxygen at all.      Review of Systems   Constitutional: Negative.    Respiratory: Negative.     Cardiovascular: Negative.    Physical Exam  Constitutional:       General: She is not in acute distress.     Appearance: Normal appearance. She is not ill-appearing, toxic-appearing or diaphoretic.   Cardiovascular:      Rate and Rhythm: Normal rate and regular rhythm.   Pulmonary:      Effort: Pulmonary effort is normal.      Breath sounds: Normal breath sounds.   Neurological:      Mental Status: She is alert.      Assessment/plan:  1. Hospital discharge follow-up    2. History of acute respiratory failure  Pulse oximetry on room air is 97 today when it was rechecked. She is feeling back to normal. No additional follow up needed for this. Keep O2 at home for now and discuss with PCP if it can be discontinued since she is seeing normal pulse ox levels at home also.     No follow-ups on file.  Medication List with Changes/Refills   Current Medications    CALCITRIOL (ROCALTROL) 0.25 MCG CAP    Take 1 capsule (0.25 mcg total) by mouth once daily.    CARVEDILOL (COREG) 25 MG TABLET    Take 1 tablet (25 mg total) by mouth 2 (two) times daily with meals.    CHOLECALCIFEROL, VITAMIN D3, 125 MCG (5,000 UNIT) TAB    Take 1 tablet (5,000 Units total) by mouth once daily.    ESCITALOPRAM OXALATE (LEXAPRO) 10 MG TABLET    Take 1 tablet (10 mg total) by mouth once daily.    FUROSEMIDE (LASIX) 40 MG TABLET    Take 2 tablets (80 mg total) by mouth 2  (two) times a day.    GUAIFENESIN (MUCINEX) 600 MG 12 HR TABLET    Take 1 tablet (600 mg total) by mouth 2 (two) times daily as needed for Congestion.    LEVOCETIRIZINE (XYZAL) 5 MG TABLET    Take 1 tablet (5 mg total) by mouth daily as needed for Allergies.    MICONAZOLE (MICOTIN) 2 % CREAM    Apply topically 2 (two) times daily to Bilateral feet    MONTELUKAST (SINGULAIR) 5 MG CHEWABLE TABLET    Chew and swallow 1 tablet (5 mg total) by mouth every evening.    NIFEDIPINE (PROCARDIA-XL) 60 MG (OSM) 24 HR TABLET    Take 1 tablet (60 mg total) by mouth nightly.    OXYCODONE (ROXICODONE) 5 MG IMMEDIATE RELEASE TABLET    Take 1 tablet (5 mg total) by mouth every 4 (four) hours as needed for Pain.    SYNTHROID 200 MCG TABLET    Take 1 tablet (200 mcg total) by mouth before breakfast.    TRAZODONE (DESYREL) 50 MG TABLET    Take 1 to 2 tablets ( mg total) by mouth nightly as needed for Insomnia.      Transitional Care Note:  Family and/or Caretaker present at visit?  No.  Diagnostic tests reviewed/disposition: No diagnosic tests pending after this hospitalization.  Disease/illness education: hypoxia  Home health/community services discussion/referrals: Patient does not have home health established from hospital visit.  They do not need home health.  If needed, we will set up home health for the patient.   Establishment or re-establishment of referral orders for community resources: No other necessary community resources.   Discussion with other health care providers: No discussion with other health care providers necessary.

## 2023-02-18 NOTE — ASSESSMENT & PLAN NOTE
Chest X-ray clear. Checked chest CT. No clear etiology found: just increased vascular congestion and atelectasis.   Checked ventilation-perfusion scan to rule out pulmonary embolism; low prob.  Continued Lasix. Wean O2 as tolerated.  No improvement 2/5; Echo completed. Checked CVP, suspect volume overload.  Echo with CVP of 8 and PASP is 58 mmHg; Lasix changed to IV 80 mg BID on 2/6  Improved at rest; dropped to SpO2 of 70% with first walk test; did well with repeat walk test on 2/8  -remains with hypoxia at rest - oxygen ordered for home

## 2023-02-18 NOTE — DISCHARGE SUMMARY
[facility] - [facility] ([address] Medicine  Discharge Summary      Patient Name: Kathie Quezada  MRN: [id number]  Patient Class: IP- Inpatient  Admission Date: 2/1/2023  Hospital Length of Stay: 9 days  Discharge Date and Time: 2/10/2023  2:37 PM  Attending Physician: No att. 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 She lives in [facility] by herself. She is . Her primary care physician is Dr. Elan Price. Her nephrologist is Dr. Fred Kelly.    She had a left arm AV fistula creation on 10/27/2022. She had balloon angioplasty of the fistula on 12/16/2022. She had transposition of the fistula on 1/31/2023. After the procedure, she felt short of breath and developed fever (measured as 101° Fahrenheit at home the next morning). Shortness of breath was not worsened or relieved by positional changes. She contacted the vascular surgeon, who advised her to go to the emergency department. She went to [facility]. She reported lower extremity edema, memory changes, confusion, poor appetite, and nausea that had been going on for a long time prior to the procedure. She was found to have hypoxia with oxygen saturation of 88%. Temperature was 101° Fahrenheit. WBC was  elevated at 77330/uL. Chest X-ray showed clear lungs. Influenza, RSV, and COVID-19 were negative. She was admitted to Hospital Medicine Team B.      * No surgery found *      Hospital Course:   See problem based hospital course.       Goals of Care Treatment Preferences:  Code Status: Full Code      Consults:   Consults (From admission, onward)          Status Ordering Provider     Inpatient consult to Nephrology  Once        Provider:  (Not yet assigned)    Completed TAHMINA BOLTON     Inpatient virtual consult to Hospital Medicine  Once        Provider:  (Not yet assigned)    Completed LINDA BEE     Inpatient consult to Vascular Surgery  Once        Provider:  (Not yet assigned)    Completed KAVITA HENRY            Neuro  Diabetic polyneuropathy associated with type 2 diabetes mellitus  Reportedly allergic to Neurontin    Psychiatric  Generalized anxiety disorder  Continue home escitalopram.    ENT  Chronic allergic rhinitis  Continue home montelukast. She takes levocetirizine.    Pulmonary  * Acute hypoxemic respiratory failure  Chest X-ray clear. Checked chest CT. No clear etiology found: just increased vascular congestion and atelectasis.   Checked ventilation-perfusion scan to rule out pulmonary embolism; low prob.  Continued Lasix. Wean O2 as tolerated.  No improvement 2/5; Echo completed. Checked CVP, suspect volume overload.  Echo with CVP of 8 and PASP is 58 mmHg; Lasix changed to IV 80 mg BID on 2/6  Improved at rest; dropped to SpO2 of 70% with first walk test; did well with repeat walk test on 2/8  -remains with hypoxia at rest - oxygen ordered for home    Pulmonary hypertension  Patient presenting with hypoxia and PASP of 58  Follow up with Cardiology; OP referral placed.    Cardiac/Vascular  A-V fistula  Vascular Surgery evaluated it since she had a procedure (basilic vein transposition) prior to developing shortness of breath and fever.  Doubt infection at this time; remains  afebrile.    Chronic diastolic heart failure  Continued home furosemide.  Changed Lasix to 80 mg IV BID on 2/6  Continued current management with IV Lasix BID  Changed to oral Lasix on 2/8 PM  Needs to adhere for fluid restriction and oral Lasix BID at discharge    Renovascular hypertension  She takes amlodipine and carvedilol.   Changing amlodipine to nifedipine; added Coreg at lower dose, monitoring HR.  Increased Coreg on 2/5; BP improving.    Renal/  Chronic kidney disease (CKD), stage V  She reports symptoms indicative of this including chronic edema, nausea, confusion.   Has AV fistula but has not yet started dialysis. Continued sodium bicarbonate, calcitriol.  Consulted Nephrology:  - increased bicarb to 1300 TID, added sevelamer 800 TID - both discontinued by Nephrology on 2/8  - home dose Lasix 40 PO BID  - changed amlodipine to nifedipine; resumed Coreg  - Can hold calcitriol while inpatient  Trial of diuresis for hypoxia was effective; no indication for HD at this time.  - discharge with Lasix 80 mg po BID, follow up with Nephrology    Hematology  Factor XI deficiency  Noted.     Endocrine  Metabolic bone disease  Started on phosphate binder; then discontinued  Phos level WNL  Follow up with Nephrology    Type 2 diabetes mellitus with stage 5 chronic kidney disease not on chronic dialysis, without long-term current use of insulin        Controlled type 2 diabetes mellitus with stable proliferative retinopathy of both eyes, unspecified whether long term insulin use  Diet controlled.    Postoperative hypothyroidism  Continue home levothyroxine.      Peanut allergy        Postoperative fever  No clear etiology  Blood cultures collected. Monitor. Follow up cultures - negative.  Remains afebrile.      Final Active Diagnoses:    Diagnosis Date Noted POA    PRINCIPAL PROBLEM:  Acute hypoxemic respiratory failure [J96.01] 02/01/2023 Yes    Pulmonary hypertension [I27.20] 02/06/2023 Yes    Peanut allergy  "[Z91.010] 02/04/2023 Yes    Metabolic bone disease [M89.8X9] 02/04/2023 Yes    Discharge planning  [Z02.9] 02/04/2023 Not Applicable    Postoperative fever [R50.82] 02/01/2023 Yes    A-V fistula [I77.0] 01/29/2023 Yes    Chronic allergic rhinitis [J30.9] 01/04/2023 Yes     Chronic    Chronic kidney disease (CKD), stage V [N18.5] 09/20/2022 Yes     Chronic    Type 2 diabetes mellitus with stage 5 chronic kidney disease not on chronic dialysis, without long-term current use of insulin [E11.22, N18.5] 09/07/2022 Yes     Chronic    Controlled type 2 diabetes mellitus with stable proliferative retinopathy of both eyes, unspecified whether long term insulin use [E11.3553]  Yes     Chronic    Diabetic polyneuropathy associated with type 2 diabetes mellitus [E11.42] 12/04/2020 Yes     Chronic    Generalized anxiety disorder [F41.1] 11/08/2019 Yes     Chronic    Chronic diastolic heart failure [I50.32] 02/07/2019 Yes     Chronic    Postoperative hypothyroidism [E89.0] 04/09/2018 Yes     Chronic    Renovascular hypertension [I15.0] 04/09/2018 Yes     Chronic    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] [birth year]/1978 Yes     Chronic      Problems Resolved During this Admission:       Discharged Condition: stable    Disposition: Home-Health Care [facility]    Follow Up:    Patient Instructions:      OXYGEN FOR HOME USE     Order Specific Question Answer Comments   Liter Flow 2    Duration Continuous    Qualifying Test Performed at: Rest    Oxygen saturation: 86    Portable mode: continuous    Route nasal cannula    Device: home concentrator with portable tanks    Length of need (in months): 12 mos    Patient condition with qualifying saturation CHF    Height: 5' 7" (1.702 m)    Weight: 104.3 kg (230 lb)    Alternative treatment measures have been tried or considered and deemed clinically ineffective. Yes      Ambulatory referral/consult to Cardiology   Standing Status: Future   Referral Priority: Routine Referral Type: Consultation   Referral Reason: " Specialty Services Required   Requested Specialty: Cardiology   Number of Visits Requested: 1     Ambulatory referral/consult to [facility]   Standing Status: Future   Referral Priority: Routine Referral Type: Consultation   Referral Reason: Specialty Services Required   Number of Visits Requested: 1     Ambulatory referral/consult to Outpatient Case Management   Referral Priority: Routine Referral Type: Consultation   Referral Reason: Specialty Services Required   Number of Visits Requested: 1     Ambulatory referral/consult to Sleep Disorders   Standing Status: Future   Referral Priority: Urgent Referral Type: Consultation   Requested Specialty: Sleep Medicine   Number of Visits Requested: 1     Diet renal     Diet Cardiac     Notify your health care provider if you experience any of the following:  temperature >100.4     Notify your health care provider if you experience any of the following:  persistent nausea and vomiting or diarrhea     Notify your health care provider if you experience any of the following:  severe uncontrolled pain     Notify your health care provider if you experience any of the following:  redness, tenderness, or signs of infection (pain, swelling, redness, odor or green/yellow discharge around incision site)     Notify your health care provider if you experience any of the following:  difficulty breathing or increased cough     Notify your health care provider if you experience any of the following:  severe persistent headache     Notify your health care provider if you experience any of the following:  worsening rash     Notify your health care provider if you experience any of the following:  persistent dizziness, light-headedness, or visual disturbances     Notify your health care provider if you experience any of the following:  increased confusion or weakness     Activity as tolerated       Significant Diagnostic Studies: as above    Pending Diagnostic Studies:        None           Medications:  Reconciled Home Medications:      Medication List        START taking these medications      guaiFENesin 600 mg 12 hr tablet  Commonly known as: MUCINEX  Take 1 tablet (600 mg total) by mouth 2 (two) times daily as needed for Congestion.     miconazole 2 % cream  Commonly known as: MICOTIN  Apply topically 2 (two) times daily to Bilateral feet     NIFEdipine 60 MG (OSM) 24 hr tablet  Commonly known as: PROCARDIA-XL  Take 1 tablet (60 mg total) by mouth nightly.            CHANGE how you take these medications      furosemide 40 MG tablet  Commonly known as: LASIX  Take 2 tablets (80 mg total) by mouth 2 (two) times a day.  What changed: how much to take            CONTINUE taking these medications      calcitRIOL 0.25 MCG Cap  Commonly known as: ROCALTROL  Take 1 capsule (0.25 mcg total) by mouth once daily.     carvediloL 25 MG tablet  Commonly known as: COREG  Take 1 tablet (25 mg total) by mouth 2 (two) times daily with meals.     cholecalciferol (vitamin D3) 125 mcg (5,000 unit) Tab  Take 1 tablet (5,000 Units total) by mouth once daily.     EScitalopram oxalate 10 MG tablet  Commonly known as: LEXAPRO  Take 1 tablet (10 mg total) by mouth once daily.     levocetirizine 5 MG tablet  Commonly known as: XYZAL  Take 1 tablet (5 mg total) by mouth daily as needed for Allergies.     montelukast 5 MG chewable tablet  Commonly known as: SINGULAIR  Chew and swallow 1 tablet (5 mg total) by mouth every evening.     oxyCODONE 5 MG immediate release tablet  Commonly known as: ROXICODONE  Take 1 tablet (5 mg total) by mouth every 4 (four) hours as needed for Pain.     SYNTHROID 200 MCG tablet  Generic drug: levothyroxine  Take 1 tablet (200 mcg total) by mouth before breakfast.     traZODone 50 MG tablet  Commonly known as: DESYREL  Take 1 to 2 tablets ( mg total) by mouth nightly as needed for Insomnia.            STOP taking these medications      amLODIPine 10 MG tablet  Commonly known  as: NORVASC     sodium bicarbonate 325 MG tablet              Indwelling Lines/Drains at time of discharge:   Lines/Drains/Airways       Drain  Duration                  Hemodialysis AV Fistula 12/16/22 0649 Left upper arm 63 days                    Time spent on the discharge of patient: 37 minutes         The attending portion of this evaluation, treatment, and documentation was performed per Kisha Crystal MD via Telemedicine AudioVisual using the secure oDesk software platform with 2 way audio/video. The provider was located off-site and the patient is located in the hospital. The aforementioned video software was utilized to document the relevant history and physical exam    Kisha Crystal MD  Department of Hospital Medicine  [facility] - Intensive Care ([place])

## 2023-02-19 ENCOUNTER — TELEPHONE (OUTPATIENT)
Dept: ADMINISTRATIVE | Facility: CLINIC | Age: 63
End: 2023-02-19
Payer: MEDICARE

## 2023-03-01 ENCOUNTER — PATIENT OUTREACH (OUTPATIENT)
Dept: ADMINISTRATIVE | Facility: OTHER | Age: 63
End: 2023-03-01
Payer: MEDICARE

## 2023-03-01 NOTE — PROGRESS NOTES
CHW - Case Closure    This Community Health Worker spoke to patient via telephone today.     Pt/Caregiver reported: Pt stated she doesn't need any community resources at this time her neighbors help get around     Pt/Caregiver denied any additional needs at this time and agrees with episode closure at this time.  Provided patient with Community Health Worker's contact information and encouraged him/her to contact this Community Health Worker if additional needs arise.

## 2023-03-01 NOTE — PROGRESS NOTES
CHW - Outreach Attempt    Community Health Worker left a voicemail message for 1st attempt to contact patient regarding: case management referral    Community Health Worker to attempt to contact patient on: 3/1/23

## 2023-03-02 ENCOUNTER — TELEPHONE (OUTPATIENT)
Dept: TRANSPLANT | Facility: CLINIC | Age: 63
End: 2023-03-02
Payer: MEDICARE

## 2023-03-02 DIAGNOSIS — Z76.82 ORGAN TRANSPLANT CANDIDATE: Primary | ICD-10-CM

## 2023-03-02 NOTE — TELEPHONE ENCOUNTER
Spoke to the patient; Scheduled G6PD and HLA (3/16 [facility]); Pt confirmed her appointment date time and location; reminder letter sent; the patient have no further questions or concerns at this time.

## 2023-03-07 ENCOUNTER — TELEPHONE (OUTPATIENT)
Dept: TRANSPLANT | Facility: CLINIC | Age: 63
End: 2023-03-07
Payer: MEDICARE

## 2023-03-07 NOTE — TELEPHONE ENCOUNTER
MA notes per Dr Coyne(Nephrologist)    FOR THE PAST THREE MONTHS:    0-Appt Adherence  0-No-shows    No concerns with labs, care giving, transportation, or mental health    Kenna Tillman  Abdominal Transplant MA

## 2023-03-07 NOTE — TELEPHONE ENCOUNTER
MA notes:    MA messaged Dr Fred Kelly for compliance information on the pt.. waiting on response.     Kenna Newberry MA

## 2023-03-07 NOTE — TELEPHONE ENCOUNTER
Spoken to patient in regards to home O2. Patient stated that she doesn't use home O2. Patient stated that the lower her O2 have been is in the 90's. She stated that she checks her O2 a few time a day with a pulse Ox. She stated that since her virus is gone, she's doing better.   Patient informed that I will discuss her case on Friday and let her know what the committee decides. Patient voiced understanding.

## 2023-03-08 ENCOUNTER — HOSPITAL ENCOUNTER (OUTPATIENT)
Dept: VASCULAR SURGERY | Facility: CLINIC | Age: 63
Discharge: HOME | End: 2023-03-08
Attending: SURGERY
Payer: MEDICARE

## 2023-03-08 ENCOUNTER — OFFICE VISIT (OUTPATIENT)
Dept: VASCULAR SURGERY | Facility: CLINIC | Age: 63
End: 2023-03-08
Payer: MEDICARE

## 2023-03-08 VITALS
WEIGHT: 231.5 LBS | HEART RATE: 76 BPM | BODY MASS INDEX: 36.34 KG/M2 | HEIGHT: 67 IN | TEMPERATURE: 98 F | DIASTOLIC BLOOD PRESSURE: 77 MMHG | SYSTOLIC BLOOD PRESSURE: 173 MMHG

## 2023-03-08 DIAGNOSIS — N18.6 ESRD (END STAGE RENAL DISEASE): Primary | ICD-10-CM

## 2023-03-08 DIAGNOSIS — T82.898A AV FISTULA OCCLUSION: ICD-10-CM

## 2023-03-08 DIAGNOSIS — T82.898A ARTERIOVENOUS FISTULA OCCLUSION, INITIAL ENCOUNTER: Primary | ICD-10-CM

## 2023-03-08 DIAGNOSIS — I77.0 ARTERIOVENOUS FISTULA: ICD-10-CM

## 2023-03-08 PROCEDURE — 3044F HG A1C LEVEL LT 7.0%: CPT | Mod: HCNC,CPTII,NTX,S$GLB | Performed by: SURGERY

## 2023-03-08 PROCEDURE — 3008F PR BODY MASS INDEX (BMI) DOCUMENTED: ICD-10-PCS | Mod: HCNC,CPTII,NTX,S$GLB | Performed by: SURGERY

## 2023-03-08 PROCEDURE — 1159F MED LIST DOCD IN RCRD: CPT | Mod: HCNC,CPTII,NTX,S$GLB | Performed by: SURGERY

## 2023-03-08 PROCEDURE — 99214 OFFICE O/P EST MOD 30 MIN: CPT | Mod: 24,HCNC,NTX,S$GLB | Performed by: SURGERY

## 2023-03-08 PROCEDURE — 93990 PR DUPLEX HEMODIALYSIS ACCESS: ICD-10-PCS | Mod: HCNC,S$GLB,TXP, | Performed by: SURGERY

## 2023-03-08 PROCEDURE — 93990 DOPPLER FLOW TESTING: CPT | Mod: HCNC,S$GLB,TXP, | Performed by: SURGERY

## 2023-03-08 PROCEDURE — 99214 PR OFFICE/OUTPT VISIT, EST, LEVL IV, 30-39 MIN: ICD-10-PCS | Mod: 24,HCNC,NTX,S$GLB | Performed by: SURGERY

## 2023-03-08 PROCEDURE — 3078F DIAST BP <80 MM HG: CPT | Mod: HCNC,CPTII,NTX,S$GLB | Performed by: SURGERY

## 2023-03-08 PROCEDURE — 1159F PR MEDICATION LIST DOCUMENTED IN MEDICAL RECORD: ICD-10-PCS | Mod: HCNC,CPTII,NTX,S$GLB | Performed by: SURGERY

## 2023-03-08 PROCEDURE — 1111F DSCHRG MED/CURRENT MED MERGE: CPT | Mod: HCNC,CPTII,NTX,S$GLB | Performed by: SURGERY

## 2023-03-08 PROCEDURE — 99999 PR PBB SHADOW E&M-EST. PATIENT-LVL III: CPT | Mod: PBBFAC,HCNC,TXP, | Performed by: SURGERY

## 2023-03-08 PROCEDURE — 3077F PR MOST RECENT SYSTOLIC BLOOD PRESSURE >= 140 MM HG: ICD-10-PCS | Mod: HCNC,CPTII,NTX,S$GLB | Performed by: SURGERY

## 2023-03-08 PROCEDURE — 99999 PR PBB SHADOW E&M-EST. PATIENT-LVL III: ICD-10-PCS | Mod: PBBFAC,HCNC,TXP, | Performed by: SURGERY

## 2023-03-08 PROCEDURE — 1111F PR DISCHARGE MEDS RECONCILED W/ CURRENT OUTPATIENT MED LIST: ICD-10-PCS | Mod: HCNC,CPTII,NTX,S$GLB | Performed by: SURGERY

## 2023-03-08 PROCEDURE — 3066F PR DOCUMENTATION OF TREATMENT FOR NEPHROPATHY: ICD-10-PCS | Mod: HCNC,CPTII,NTX,S$GLB | Performed by: SURGERY

## 2023-03-08 PROCEDURE — 3066F NEPHROPATHY DOC TX: CPT | Mod: HCNC,CPTII,NTX,S$GLB | Performed by: SURGERY

## 2023-03-08 PROCEDURE — 3078F PR MOST RECENT DIASTOLIC BLOOD PRESSURE < 80 MM HG: ICD-10-PCS | Mod: HCNC,CPTII,NTX,S$GLB | Performed by: SURGERY

## 2023-03-08 PROCEDURE — 3008F BODY MASS INDEX DOCD: CPT | Mod: HCNC,CPTII,NTX,S$GLB | Performed by: SURGERY

## 2023-03-08 PROCEDURE — 3077F SYST BP >= 140 MM HG: CPT | Mod: HCNC,CPTII,NTX,S$GLB | Performed by: SURGERY

## 2023-03-08 PROCEDURE — 3044F PR MOST RECENT HEMOGLOBIN A1C LEVEL <7.0%: ICD-10-PCS | Mod: HCNC,CPTII,NTX,S$GLB | Performed by: SURGERY

## 2023-03-08 RX ORDER — SODIUM CHLORIDE 9 MG/ML
INJECTION, SOLUTION INTRAVENOUS CONTINUOUS
Status: CANCELLED | OUTPATIENT
Start: 2023-03-08

## 2023-03-08 RX ORDER — ONDANSETRON 2 MG/ML
4 INJECTION INTRAMUSCULAR; INTRAVENOUS EVERY 12 HOURS PRN
Status: CANCELLED | OUTPATIENT
Start: 2023-03-08

## 2023-03-08 NOTE — PROGRESS NOTES
Kathie Quezada  03/08/2023    HPI:  Patient is a 63 y.o. female with a h/o DMII with retinopathy, HTN, CVA, Factor 11 deficiency, and multiple AKIS the last was from urinary retention who is here today for f/u.  She is R handed with an obese upper arm.  Pt is unsure of timeframe for needing dialysis.  Last GFR 13.9, Cr 3.6.  Hypertensive today to 180s SBP.     S/p   1/31/23  Transposed L BVT,. 2nd stage  Findings: 2nd stage L BVT AVF done; dilated to 5-6 mm with strong thrill throughout course.     12/16/22  L TRA, PTA proximal (diffuse) stenoses x3 with 6x60mm Sterlin balloon     Findings/Key Components:  Successful treatment of proximal stenosis  Good palpable thrill    10/27/22  Creation of 1st stage L BVT AVF     Findings/Key Components:  L basilic vein dilated to 3-4mm; segment of 8-10cm was mobilized at a/c fossa  Strong thrill  Triphasic L ulnar doppler signal with minimal augmentation with AVF compression  Obese upper arm (BMI 38)  Consider transposition in 3-6months+       No hx of MI  Hx of CHF, EF 65%%, enlarged RA   Stroke ~3 years ago, no residual deficits   Tobacco use: never     Past Medical History:   Diagnosis Date    Acute respiratory failure with hypoxia and hypercapnia 05/26/2019    ATN (acute tubular necrosis) 02/08/2019    Chronic diastolic heart failure 02/07/2019    2-8-19 Mild left atrial enlargement. Mild left ventricular enlargement. Normal left ventricular systolic function. The estimated ejection fraction is 55% Indeterminate left ventricular diastolic function. Normal right ventricular systolic function. Mild mitral regurgitation. Mild to moderate tricuspid regurgitation. The estimated PA systolic pressure is 32 mm Hg Normal central venous pressure (3 m    CKD (chronic kidney disease) stage 4, GFR 15-29 ml/min 05/16/2018    CKD stage G3a/A3, GFR 45-59 and albumin creatinine ratio >300 mg/g 05/16/2018    CKD stage G3b/A3, GFR 30-44 and albumin creatinine ratio >300 mg/g  05/16/2018    Closed compression fracture of L2 lumbar vertebra 05/24/2019    Closed compression fracture of L2 lumbar vertebra 05/24/2019     IMO Regulatory Load October 2019    Controlled type 2 diabetes with retinopathy 04/09/2018    COVID-19 virus infection 7-1-2021 07/01/2021    CVA (cerebral vascular accident) 2017    Diabetic polyneuropathy associated with type 2 diabetes mellitus 12/04/2020    Encounter for blood transfusion     Essential hypertension 04/09/2018    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  7-31-19 Factor XI Activity 55 - 145 % 96       Gastroesophageal reflux disease without esophagitis 05/24/2019    Generalized anxiety disorder 11/08/2019    GERD (gastroesophageal reflux disease)     GIB (gastrointestinal bleeding) 10/18/2018    History of CVA (cerebrovascular accident) without residual deficits 11/27/2022    History of hepatitis C     s/p succesful Rx w/ Harvoni  - [id number] (cure) 2016    Hypertensive retinopathy, bilateral 12/10/2019    Mild nonproliferative diabetic retinopathy 05/02/2018    Mixed hyperlipidemia 04/09/2018    Myalgia due to statin 09/07/2022    Normocytic anemia 01/14/2022    NS (nuclear sclerosis), bilateral 12/10/2019    PNA (pneumonia) 05/28/2019    Postoperative hypothyroidism     Renovascular hypertension 04/09/2018    S/P kyphoplasty 08/14/2019 8/14/19: L2 kyphoplasty with Dr. Kwon     Slow transit constipation 05/27/2019    Type 2 diabetes mellitus with circulatory disorder, with long-term current use of insulin 04/09/2018    Type 2 diabetes mellitus with stage 3 chronic kidney disease, without long-term current use of insulin 04/09/2018    Type 2 diabetes mellitus with stage 3a chronic kidney disease, without long-term current use of insulin 03/03/2021    Type 2 diabetes mellitus with stage 3b chronic kidney disease, without long-term current use of insulin 09/07/2022     Past Surgical History:   Procedure Laterality Date    AV FISTULA  PLACEMENT Left 10/27/2022    Procedure: CREATION, AV FISTULA;  Surgeon: Tirso Tavera MD;  [facility];  Service: Peripheral Vascular;  Laterality: Left;  Brachio basilic 1st stage     BACK SURGERY  2017    CHOLECYSTECTOMY  2015    [facility]    COLONOSCOPY  11/13/2015    Dr Chino torres prep. internal hemorrhoids. diverticulosis of sigmois. NO polyps. repeat due 2025    FISTULOGRAM N/A 12/16/2022    Procedure: Fistulogram;  Surgeon: Tirso Tavera MD;  Location: [facility] CATH LAB;  Service: Peripheral Vascular;  Laterality: N/A;    FIXATION KYPHOPLASTY Bilateral 08/14/2019    Procedure: L2 kyphoplasty Fluoro Globus;  Surgeon: Jeremie Kwon DO;  Location: [facility] OR;  Service: Neurosurgery;  Laterality: Bilateral;  GLOBUS MACRINA WINSLOW [phone] TEXTED HER @ 10:54AM ON 7-30-19  PRE-OP-BY RN  8-7-2019    HYSTERECTOMY      OOPHORECTOMY  1996    one    PERCUTANEOUS TRANSLUMINAL ANGIOPLASTY OF ARTERIOVENOUS FISTULA Left 12/16/2022    Procedure: PTA, AV FISTULA;  Surgeon: Tirso Tavera MD;  Location: [facility] CATH LAB;  Service: Peripheral Vascular;  Laterality: Left;    THYROIDECTOMY  2016    at [facility] for thyroid nodule    TONSILLECTOMY      TRANSPOSITION OF BASILIC VEIN Left 1/31/2023    Procedure: TRANSPOSITION, VEIN, BASILIC;  Surgeon: Tirso Tavera MD;  Location: [facility] OR [facility]LR;  Service: Peripheral Vascular;  Laterality: Left;     Family History   Problem Relation Age of Onset    Stroke Mother     Heart disease Mother     Hypertension Mother     Diabetes type II Mother     Coronary artery disease Mother     Hypertension Father     Prostate cancer Father     Aneurysm Brother      Social History     Socioeconomic History    Marital status:    Tobacco Use    Smoking status: Never    Smokeless tobacco: Never   Substance and Sexual Activity    Alcohol use: Not Currently    Drug use: No    Sexual activity: Not Currently   Social History Narrative    Pt enjoys cooking and 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 [employer]      Social Determinants of Health     Financial Resource Strain: Low Risk     Difficulty of Paying Living Expenses: Not hard at all   Food Insecurity: No Food Insecurity    Worried About Running Out of Food in the Last Year: Never true    Ran Out of Food in the Last Year: Never true   Transportation Needs: No Transportation Needs    Lack of Transportation (Medical): No    Lack of Transportation (Non-Medical): No   Physical Activity: Unknown    Days of Exercise per Week: 1 day   Stress: No Stress Concern Present    Feeling of Stress : Only a little   Social Connections: Unknown    Frequency of Communication with Friends and Family: More than three times a week    Frequency of Social Gatherings with Friends and Family: More than three times a week    Active Member of Clubs or Organizations: Yes    Attends Club or Organization Meetings: 1 to 4 times per year    Marital Status:    Housing Stability: Low Risk     Unable to Pay for Housing in the Last Year: No    Number of Places Lived in the Last Year: 1    Unstable Housing in the Last Year: No       Current Outpatient Medications:     calcitRIOL (ROCALTROL) 0.25 MCG Cap, Take 1 capsule (0.25 mcg total) by mouth once daily., Disp: 90 capsule, Rfl: 1    carvediloL (COREG) 25 MG tablet, Take 1 tablet (25 mg total) by mouth 2 (two) times daily with meals., Disp: 180 tablet, Rfl: 3    cholecalciferol, vitamin D3, 125 mcg (5,000 unit) Tab, Take 1 tablet (5,000 Units total) by mouth once daily., Disp: 90 tablet, Rfl: 3    EScitalopram oxalate (LEXAPRO) 10 MG tablet, Take 1 tablet (10 mg total) by mouth once daily., Disp: 90 tablet, Rfl: 3    furosemide (LASIX) 40 MG tablet, Take 2 tablets (80 mg total) by mouth 2 (two) times a day., Disp: 120 tablet, Rfl: 3    levocetirizine (XYZAL) 5 MG tablet, Take 1 tablet (5 mg total) by mouth daily as needed for Allergies., Disp: 90 tablet, Rfl: 3    miconazole (MICOTIN) 2 % cream, Apply topically 2  (two) times daily to Bilateral feet, Disp: 57 g, Rfl: 2    montelukast (SINGULAIR) 5 MG chewable tablet, Chew and swallow 1 tablet (5 mg total) by mouth every evening., Disp: 30 tablet, Rfl: 0    NIFEdipine (PROCARDIA-XL) 60 MG (OSM) 24 hr tablet, Take 1 tablet (60 mg total) by mouth nightly., Disp: 30 tablet, Rfl: 11    oxyCODONE (ROXICODONE) 5 MG immediate release tablet, Take 1 tablet (5 mg total) by mouth every 4 (four) hours as needed for Pain., Disp: 15 tablet, Rfl: 0    SYNTHROID 200 mcg tablet, Take 1 tablet (200 mcg total) by mouth before breakfast., Disp: 90 tablet, Rfl: 3    traZODone (DESYREL) 50 MG tablet, Take 1 to 2 tablets ( mg total) by mouth nightly as needed for Insomnia., Disp: 120 tablet, Rfl: 3    REVIEW OF SYSTEMS:  General: negative; ENT: negative; Allergy and Immunology: negative; Hematological and Lymphatic: Negative; Endocrine: negative; Respiratory: no cough, shortness of breath, or wheezing; Cardiovascular: no chest pain or dyspnea on exertion; Gastrointestinal: no abdominal pain/back, change in bowel habits, or bloody stools; Genito-Urinary: no dysuria, trouble voiding, or hematuria; Musculoskeletal: negative  Neurological: no TIA or stroke symptoms; Psychiatric: no nervousness, anxiety or depression.    PHYSICAL EXAM:   Right Arm BP - Sittin/77 (23 1020)  Pulse: 76  Temp: 98.2 °F (36.8 °C)      General appearance:  Alert, well-appearing, and in no distress.  Oriented to person, place, and time   Neurological: Normal speech, no focal findings noted; CN II - XII grossly intact           Musculoskeletal: Digits/nail without cyanosis/clubbing.  Normal muscle strength/tone.                 Neck: Supple, no significant adenopathy; thyroid is not enlarged                Chest:  Clear to auscultation, no wheezes, rales or rhonchi, symmetric air entry     No use of accessory muscles             Cardiac: Normal rate and regular rhythm, S1 and S2 normal; PMI non-displaced           Abdomen: Soft, nontender, nondistended, no masses or organomegaly     No rebound tenderness noted; bowel sounds normal     No groin adenopathy      Extremities:   3 cm scar overlying L antecubital fossa      Soft 2+ L radial pulse, 2+ L brachial pulse      Significant obesity upper arm      Proximal L BVT AVF pulsatility, then fair thrill    LAB RESULTS:  Lab Results   Component Value Date    K 3.7 02/10/2023    K 4.3 2023    K 3.9 2023    CREATININE 4.0 (H) 02/10/2023    CREATININE 3.9 (H) 2023    CREATININE 4.0 (H) 2023     Lab Results   Component Value Date    WBC 11.40 02/10/2023    WBC 10.80 2023    WBC 10.87 2023    HCT 28.9 (L) 02/10/2023    HCT 28.7 (L) 2023    HCT 31.7 (L) 2023     02/10/2023     2023     2023     Lab Results   Component Value Date    HGBA1C 5.2 2023    HGBA1C 5.5 2022    HGBA1C 6.8 (H) 2022     IMAGIN2023   AVF u/s: flow vol 1,326 ml/min  PSVs 688 and 597 cm/s proximally -- 'long segment narrowing beyond anastomosis'    AVF u/s: flow vol 1127 ml/min  PSVs 706 cm/s proximally -- 'long segment narrowing beyond anastomosis'    IMP/PLAN:     63 y.o. female with a h/o DMII with retinopathy, HTN, stroke and obesity (BMI 40), and multiple AKIs the last was from urinary retention -- s/p L BVT AVF (2-stages) and prior PTA diffuse proximal stenosis -- recurrent proximal single lesion  Obese L upper arm  Needs L TRA PTA proximal lesion, 5/6fr slender, CO2 angio OR11 3/14/23 *limit contrast - not yet on HD       Tirso Tavera MD DFSVS FACS   Vascular/Endovascular Surgery

## 2023-03-08 NOTE — PROGRESS NOTES
Patient Kathie Quezada, MRN [id number], was dependent on dialysis (ICD10 Z99.2) at the time of this visit on 3/8/23. This addendum is made to the medical record on 03/08/2023.

## 2023-03-08 NOTE — H&P (VIEW-ONLY)
Kathie Quezada  03/08/2023    HPI:  Patient is a 63 y.o. female with a h/o DMII with retinopathy, HTN, CVA, Factor 11 deficiency, and multiple AKIS the last was from urinary retention who is here today for f/u.  She is R handed with an obese upper arm.  Pt is unsure of timeframe for needing dialysis.  Last GFR 13.9, Cr 3.6.  Hypertensive today to 180s SBP.     S/p   1/31/23  Transposed L BVT,. 2nd stage  Findings: 2nd stage L BVT AVF done; dilated to 5-6 mm with strong thrill throughout course.     12/16/22  L TRA, PTA proximal (diffuse) stenoses x3 with 6x60mm Sterlin balloon     Findings/Key Components:  Successful treatment of proximal stenosis  Good palpable thrill    10/27/22  Creation of 1st stage L BVT AVF     Findings/Key Components:  L basilic vein dilated to 3-4mm; segment of 8-10cm was mobilized at a/c fossa  Strong thrill  Triphasic L ulnar doppler signal with minimal augmentation with AVF compression  Obese upper arm (BMI 38)  Consider transposition in 3-6months+       No hx of MI  Hx of CHF, EF 65%%, enlarged RA   Stroke ~3 years ago, no residual deficits   Tobacco use: never     Past Medical History:   Diagnosis Date    Acute respiratory failure with hypoxia and hypercapnia 05/26/2019    ATN (acute tubular necrosis) 02/08/2019    Chronic diastolic heart failure 02/07/2019    2-8-19 Mild left atrial enlargement. Mild left ventricular enlargement. Normal left ventricular systolic function. The estimated ejection fraction is 55% Indeterminate left ventricular diastolic function. Normal right ventricular systolic function. Mild mitral regurgitation. Mild to moderate tricuspid regurgitation. The estimated PA systolic pressure is 32 mm Hg Normal central venous pressure (3 m    CKD (chronic kidney disease) stage 4, GFR 15-29 ml/min 05/16/2018    CKD stage G3a/A3, GFR 45-59 and albumin creatinine ratio >300 mg/g 05/16/2018    CKD stage G3b/A3, GFR 30-44 and albumin creatinine ratio >300 mg/g  05/16/2018    Closed compression fracture of L2 lumbar vertebra 05/24/2019    Closed compression fracture of L2 lumbar vertebra 05/24/2019     IMO Regulatory Load October 2019    Controlled type 2 diabetes with retinopathy 04/09/2018    COVID-19 virus infection 7-1-2021 07/01/2021    CVA (cerebral vascular accident) 2017    Diabetic polyneuropathy associated with type 2 diabetes mellitus 12/04/2020    Encounter for blood transfusion     Essential hypertension 04/09/2018    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  7-31-19 Factor XI Activity 55 - 145 % 96       Gastroesophageal reflux disease without esophagitis 05/24/2019    Generalized anxiety disorder 11/08/2019    GERD (gastroesophageal reflux disease)     GIB (gastrointestinal bleeding) 10/18/2018    History of CVA (cerebrovascular accident) without residual deficits 11/27/2022    History of hepatitis C     s/p succesful Rx w/ Harvoni  - SVR12 (cure) 2016    Hypertensive retinopathy, bilateral 12/10/2019    Mild nonproliferative diabetic retinopathy 05/02/2018    Mixed hyperlipidemia 04/09/2018    Myalgia due to statin 09/07/2022    Normocytic anemia 01/14/2022    NS (nuclear sclerosis), bilateral 12/10/2019    PNA (pneumonia) 05/28/2019    Postoperative hypothyroidism     Renovascular hypertension 04/09/2018    S/P kyphoplasty 08/14/2019 8/14/19: L2 kyphoplasty with Dr. Kwon     Slow transit constipation 05/27/2019    Type 2 diabetes mellitus with circulatory disorder, with long-term current use of insulin 04/09/2018    Type 2 diabetes mellitus with stage 3 chronic kidney disease, without long-term current use of insulin 04/09/2018    Type 2 diabetes mellitus with stage 3a chronic kidney disease, without long-term current use of insulin 03/03/2021    Type 2 diabetes mellitus with stage 3b chronic kidney disease, without long-term current use of insulin 09/07/2022     Past Surgical History:   Procedure Laterality Date    AV FISTULA  PLACEMENT Left 10/27/2022    Procedure: CREATION, AV FISTULA;  Surgeon: Tirso Tavera MD;  [facility];  Service: Peripheral Vascular;  Laterality: Left;  Brachio basilic 1st stage     BACK SURGERY  2017    CHOLECYSTECTOMY  2015    [facility]    COLONOSCOPY  11/13/2015    Dr Chino torres prep. internal hemorrhoids. diverticulosis of sigmois. NO polyps. repeat due 2025    FISTULOGRAM N/A 12/16/2022    Procedure: Fistulogram;  Surgeon: Tirso Tavera MD;  Location: [facility] CATH LAB;  Service: Peripheral Vascular;  Laterality: N/A;    FIXATION KYPHOPLASTY Bilateral 08/14/2019    Procedure: L2 kyphoplasty Fluoro Globus;  Surgeon: Jeremie Kwon DO;  Location: [facility] OR;  Service: Neurosurgery;  Laterality: Bilateral;  GLOBUS MACRINA WINSLOW [phone] TEXTED HER @ 10:54AM ON 7-30-19  PRE-OP-BY RN  8-7-2019    HYSTERECTOMY      OOPHORECTOMY  1996    one    PERCUTANEOUS TRANSLUMINAL ANGIOPLASTY OF ARTERIOVENOUS FISTULA Left 12/16/2022    Procedure: PTA, AV FISTULA;  Surgeon: Tirso Tavera MD;  Location: [facility] CATH LAB;  Service: Peripheral Vascular;  Laterality: Left;    THYROIDECTOMY  2016    at [facility] for thyroid nodule    TONSILLECTOMY      TRANSPOSITION OF BASILIC VEIN Left 1/31/2023    Procedure: TRANSPOSITION, VEIN, BASILIC;  Surgeon: Tirso Tavera MD;  Location: [facility] OR [facility];  Service: Peripheral Vascular;  Laterality: Left;     Family History   Problem Relation Age of Onset    Stroke Mother     Heart disease Mother     Hypertension Mother     Diabetes type II Mother     Coronary artery disease Mother     Hypertension Father     Prostate cancer Father     Aneurysm Brother      Social History     Socioeconomic History    Marital status:    Tobacco Use    Smoking status: Never    Smokeless tobacco: Never   Substance and Sexual Activity    Alcohol use: Not Currently    Drug use: No    Sexual activity: Not Currently   Social History Narrative    Pt enjoys cooking and 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 [employer]      Social Determinants of Health     Financial Resource Strain: Low Risk     Difficulty of Paying Living Expenses: Not hard at all   Food Insecurity: No Food Insecurity    Worried About Running Out of Food in the Last Year: Never true    Ran Out of Food in the Last Year: Never true   Transportation Needs: No Transportation Needs    Lack of Transportation (Medical): No    Lack of Transportation (Non-Medical): No   Physical Activity: Unknown    Days of Exercise per Week: 1 day   Stress: No Stress Concern Present    Feeling of Stress : Only a little   Social Connections: Unknown    Frequency of Communication with Friends and Family: More than three times a week    Frequency of Social Gatherings with Friends and Family: More than three times a week    Active Member of Clubs or Organizations: Yes    Attends Club or Organization Meetings: 1 to 4 times per year    Marital Status:    Housing Stability: Low Risk     Unable to Pay for Housing in the Last Year: No    Number of Places Lived in the Last Year: 1    Unstable Housing in the Last Year: No       Current Outpatient Medications:     calcitRIOL (ROCALTROL) 0.25 MCG Cap, Take 1 capsule (0.25 mcg total) by mouth once daily., Disp: 90 capsule, Rfl: 1    carvediloL (COREG) 25 MG tablet, Take 1 tablet (25 mg total) by mouth 2 (two) times daily with meals., Disp: 180 tablet, Rfl: 3    cholecalciferol, vitamin D3, 125 mcg (5,000 unit) Tab, Take 1 tablet (5,000 Units total) by mouth once daily., Disp: 90 tablet, Rfl: 3    EScitalopram oxalate (LEXAPRO) 10 MG tablet, Take 1 tablet (10 mg total) by mouth once daily., Disp: 90 tablet, Rfl: 3    furosemide (LASIX) 40 MG tablet, Take 2 tablets (80 mg total) by mouth 2 (two) times a day., Disp: 120 tablet, Rfl: 3    levocetirizine (XYZAL) 5 MG tablet, Take 1 tablet (5 mg total) by mouth daily as needed for Allergies., Disp: 90 tablet, Rfl: 3    miconazole (MICOTIN) 2 % cream, Apply topically 2  (two) times daily to Bilateral feet, Disp: 57 g, Rfl: 2    montelukast (SINGULAIR) 5 MG chewable tablet, Chew and swallow 1 tablet (5 mg total) by mouth every evening., Disp: 30 tablet, Rfl: 0    NIFEdipine (PROCARDIA-XL) 60 MG (OSM) 24 hr tablet, Take 1 tablet (60 mg total) by mouth nightly., Disp: 30 tablet, Rfl: 11    oxyCODONE (ROXICODONE) 5 MG immediate release tablet, Take 1 tablet (5 mg total) by mouth every 4 (four) hours as needed for Pain., Disp: 15 tablet, Rfl: 0    SYNTHROID 200 mcg tablet, Take 1 tablet (200 mcg total) by mouth before breakfast., Disp: 90 tablet, Rfl: 3    traZODone (DESYREL) 50 MG tablet, Take 1 to 2 tablets ( mg total) by mouth nightly as needed for Insomnia., Disp: 120 tablet, Rfl: 3    REVIEW OF SYSTEMS:  General: negative; ENT: negative; Allergy and Immunology: negative; Hematological and Lymphatic: Negative; Endocrine: negative; Respiratory: no cough, shortness of breath, or wheezing; Cardiovascular: no chest pain or dyspnea on exertion; Gastrointestinal: no abdominal pain/back, change in bowel habits, or bloody stools; Genito-Urinary: no dysuria, trouble voiding, or hematuria; Musculoskeletal: negative  Neurological: no TIA or stroke symptoms; Psychiatric: no nervousness, anxiety or depression.    PHYSICAL EXAM:   Right Arm BP - Sittin/77 (23 1020)  Pulse: 76  Temp: 98.2 °F (36.8 °C)      General appearance:  Alert, well-appearing, and in no distress.  Oriented to person, place, and time   Neurological: Normal speech, no focal findings noted; CN II - XII grossly intact           Musculoskeletal: Digits/nail without cyanosis/clubbing.  Normal muscle strength/tone.                 Neck: Supple, no significant adenopathy; thyroid is not enlarged                Chest:  Clear to auscultation, no wheezes, rales or rhonchi, symmetric air entry     No use of accessory muscles             Cardiac: Normal rate and regular rhythm, S1 and S2 normal; PMI non-displaced           Abdomen: Soft, nontender, nondistended, no masses or organomegaly     No rebound tenderness noted; bowel sounds normal     No groin adenopathy      Extremities:   3 cm scar overlying L antecubital fossa      Soft 2+ L radial pulse, 2+ L brachial pulse      Significant obesity upper arm      Proximal L BVT AVF pulsatility, then fair thrill    LAB RESULTS:  Lab Results   Component Value Date    K 3.7 02/10/2023    K 4.3 2023    K 3.9 2023    CREATININE 4.0 (H) 02/10/2023    CREATININE 3.9 (H) 2023    CREATININE 4.0 (H) 2023     Lab Results   Component Value Date    WBC 11.40 02/10/2023    WBC 10.80 2023    WBC 10.87 2023    HCT 28.9 (L) 02/10/2023    HCT 28.7 (L) 2023    HCT 31.7 (L) 2023     02/10/2023     2023     2023     Lab Results   Component Value Date    HGBA1C 5.2 2023    HGBA1C 5.5 2022    HGBA1C 6.8 (H) 2022     IMAGIN2023   AVF u/s: flow vol 1,326 ml/min  PSVs 688 and 597 cm/s proximally -- 'long segment narrowing beyond anastomosis'    AVF u/s: flow vol 1127 ml/min  PSVs 706 cm/s proximally -- 'long segment narrowing beyond anastomosis'    IMP/PLAN:     63 y.o. female with a h/o DMII with retinopathy, HTN, stroke and obesity (BMI 40), and multiple AKIs the last was from urinary retention -- s/p L BVT AVF (2-stages) and prior PTA diffuse proximal stenosis -- recurrent proximal single lesion  Obese L upper arm  Needs L TRA PTA proximal lesion, 5/6fr slender, CO2 angio OR11 3/14/23 *limit contrast - not yet on HD       Tirso Tavera MD DFSVS FACS   Vascular/Endovascular Surgery

## 2023-03-09 ENCOUNTER — OUTPATIENT CASE MANAGEMENT (OUTPATIENT)
Dept: ADMINISTRATIVE | Facility: OTHER | Age: 63
End: 2023-03-09
Payer: MEDICARE

## 2023-03-10 ENCOUNTER — COMMITTEE REVIEW (OUTPATIENT)
Dept: TRANSPLANT | Facility: CLINIC | Age: 63
End: 2023-03-10
Payer: MEDICARE

## 2023-03-10 ENCOUNTER — EPISODE CHANGES (OUTPATIENT)
Dept: TRANSPLANT | Facility: CLINIC | Age: 63
End: 2023-03-10

## 2023-03-10 ENCOUNTER — TELEPHONE (OUTPATIENT)
Dept: VASCULAR SURGERY | Facility: CLINIC | Age: 63
End: 2023-03-10
Payer: MEDICARE

## 2023-03-10 DIAGNOSIS — Z76.82 ORGAN TRANSPLANT CANDIDATE: Primary | ICD-10-CM

## 2023-03-10 NOTE — LETTER
March 10, 2023    Kathie Umanaanc  [address] [facility] [address]    Dear Kathie Quezada:  MRN: [id number]    Your transplant evaluation was reviewed at the [facility] meeting on 3/10/2023.  It is with regret I inform you that your workup is incomplete and we are unable to determine your transplant candidacy at this time due to the need for 6 minute walk, pulmonary function tests (PFT's), improved PA pressures on echo, and transplant clinic reassessment.  You will be re-presented to the selection committee once pending studies are completed.  You will be notified of the committees decision once we review the new results.    The [facility] Selection Committee carefully considers each patients transplant candidacy using established selection criteria to determine if it is safe to proceed with transplantation for each and every person evaluated.  Although the selection committee believes your workup is incomplete and we are unable to determine your transplant candidacy, you have the right to be evaluated at other transplant centers.  You may request your [facility] records be sent to any center of your choice by contacting our Medical Records Department at [phone].    Attached is a letter from the [employer] Organ [employer]aring (UNOS).  It describes the services and information offered to patients by UNOS and the Organ Procurement and Transplant Network.    Sincerely,      Lora Morin MD  Medical Director, Kidney & Kidney/Pancreas Transplantation  Jr: Encl.  Cc: Fred Kelly MD               The Organ Procurement and Transplantation Network   Toll-free patient services line: Your resource for organ transplant information     If you have a question regarding your own medical care, you always should call your transplant hospital first. However, for general organ transplant-related information, you can call the Organ Procurement and Transplantation  Network (OPTN) toll-free patient services line at [phone].     Anyone, including potential transplant candidates, candidates, recipients, family members, friends, living donors, and donor family members, can call this number to:     Talk about organ donation, living donation, the transplant process, the donation process, and transplant policies.   Get a free patient information kit with helpful booklets, waiting list and transplant information, and a list of all transplant hospitals.   Ask questions about the OPTN website (https://optn.transplant.hrsa.gov/), the [employer] Organ [employer]arings (UNOS) website (https://unos.org/), or the UNOS website for living donors and transplant recipients. (https://www.transplantliving.org/).   Learn how the OPTN can help you.   Talk about any concerns that you may have with a transplant hospital.     The nations transplant system, the OPTN, is managed under federal contract by the [employer]aring (UNOS), which is a non-profit charitable organization. The OPTN helps create and define organ sharing policies that make the best use of donated organs. This process continuously evaluating new advances and discoveries so policies can be adapted to best serve patients waiting for transplants. To do so, the OPTN works closely with transplant professionals, transplant patients, transplant candidates, donor families, living donors, and the public. All transplant programs and organ procurement organizations throughout the country are OPTN members and are obligated to follow the policies the OPTN creates for allocating organs.     The OPTN also is responsible for:   Providing educational material for patients, the public, and professionals.   Raising awareness of the need for donated organs and tissue.   Coordinating organ procurement, matching, and placement.   Collecting information about every organ transplant and donation that occurs in the [place].      Remember, you should contact your transplant hospital directly if you have questions or concerns about your own medical care including medical records, work-up progress, and test results.     We are not your transplant hospital, and our staff will not be able to answer questions about your case, so please keep your transplant hospitals phone number handy.   However, while you research your transplant needs and learn as much as you can about transplantation and donation, we welcome your call to our toll-free patient services line at [phone].

## 2023-03-10 NOTE — COMMITTEE REVIEW
Native Organ Dx: Diabetes Mellitus - Type II      Unable to determine transplant candidacy at this time due to need for 6 minute walk, PFT's, improved PA pressures on echo, reassess patient in clinic to BMI, and functional status/frailty. Dr. Combs to discuss with Nephrologist regarding dialysis initiation. Get testing prior to clinic visit.     Patient informed of committee's decision. All questions were answered and patient voiced understanding.   Note written by Lynne Kirkpatrick RN    ===============================================    I was present at the meeting and attest to the decision of the committee

## 2023-03-10 NOTE — TELEPHONE ENCOUNTER
Spoke with the pt and informed her of the change in the surgery date to 3/23/23 instead of 3/14/23 due to OR11 not available.Pt verbalized understanding of information received.

## 2023-03-16 ENCOUNTER — TELEPHONE (OUTPATIENT)
Dept: ENDOSCOPY | Facility: HOSPITAL | Age: 63
End: 2023-03-16
Payer: MEDICARE

## 2023-03-16 ENCOUNTER — LAB VISIT (OUTPATIENT)
Dept: LAB | Facility: HOSPITAL | Age: 63
End: 2023-03-16
Payer: MEDICARE

## 2023-03-16 ENCOUNTER — OFFICE VISIT (OUTPATIENT)
Dept: GASTROENTEROLOGY | Facility: CLINIC | Age: 63
End: 2023-03-16
Payer: MEDICARE

## 2023-03-16 VITALS
WEIGHT: 233 LBS | SYSTOLIC BLOOD PRESSURE: 130 MMHG | DIASTOLIC BLOOD PRESSURE: 74 MMHG | BODY MASS INDEX: 34.51 KG/M2 | HEIGHT: 69 IN | HEART RATE: 70 BPM

## 2023-03-16 DIAGNOSIS — R79.89 ELEVATED SERUM CREATININE: ICD-10-CM

## 2023-03-16 DIAGNOSIS — K52.9 CHRONIC DIARRHEA: Primary | ICD-10-CM

## 2023-03-16 DIAGNOSIS — I27.20 PULMONARY HYPERTENSION: ICD-10-CM

## 2023-03-16 DIAGNOSIS — Z76.82 ORGAN TRANSPLANT CANDIDATE: ICD-10-CM

## 2023-03-16 DIAGNOSIS — D64.9 ANEMIA, UNSPECIFIED TYPE: ICD-10-CM

## 2023-03-16 DIAGNOSIS — Z76.82 ORGAN TRANSPLANT CANDIDATE: Primary | ICD-10-CM

## 2023-03-16 DIAGNOSIS — K52.9 CHRONIC DIARRHEA: ICD-10-CM

## 2023-03-16 PROCEDURE — 1159F PR MEDICATION LIST DOCUMENTED IN MEDICAL RECORD: ICD-10-PCS | Mod: CPTII,S$GLB,TXP, | Performed by: INTERNAL MEDICINE

## 2023-03-16 PROCEDURE — 3008F BODY MASS INDEX DOCD: CPT | Mod: CPTII,S$GLB,TXP, | Performed by: INTERNAL MEDICINE

## 2023-03-16 PROCEDURE — 86832 HLA CLASS I HIGH DEFIN QUAL: CPT | Mod: PO,TXP | Performed by: NURSE PRACTITIONER

## 2023-03-16 PROCEDURE — 3075F PR MOST RECENT SYSTOLIC BLOOD PRESS GE 130-139MM HG: ICD-10-PCS | Mod: CPTII,S$GLB,TXP, | Performed by: INTERNAL MEDICINE

## 2023-03-16 PROCEDURE — 3078F DIAST BP <80 MM HG: CPT | Mod: CPTII,S$GLB,TXP, | Performed by: INTERNAL MEDICINE

## 2023-03-16 PROCEDURE — 3008F PR BODY MASS INDEX (BMI) DOCUMENTED: ICD-10-PCS | Mod: CPTII,S$GLB,TXP, | Performed by: INTERNAL MEDICINE

## 2023-03-16 PROCEDURE — 86833 HLA CLASS II HIGH DEFIN QUAL: CPT | Mod: PO,TXP | Performed by: NURSE PRACTITIONER

## 2023-03-16 PROCEDURE — 1159F MED LIST DOCD IN RCRD: CPT | Mod: CPTII,S$GLB,TXP, | Performed by: INTERNAL MEDICINE

## 2023-03-16 PROCEDURE — 3044F HG A1C LEVEL LT 7.0%: CPT | Mod: CPTII,S$GLB,TXP, | Performed by: INTERNAL MEDICINE

## 2023-03-16 PROCEDURE — 99999 PR PBB SHADOW E&M-EST. PATIENT-LVL IV: CPT | Mod: PBBFAC,HCNC,TXP, | Performed by: INTERNAL MEDICINE

## 2023-03-16 PROCEDURE — 3066F NEPHROPATHY DOC TX: CPT | Mod: CPTII,S$GLB,TXP, | Performed by: INTERNAL MEDICINE

## 2023-03-16 PROCEDURE — 3075F SYST BP GE 130 - 139MM HG: CPT | Mod: CPTII,S$GLB,TXP, | Performed by: INTERNAL MEDICINE

## 2023-03-16 PROCEDURE — 99204 OFFICE O/P NEW MOD 45 MIN: CPT | Mod: S$GLB,TXP,, | Performed by: INTERNAL MEDICINE

## 2023-03-16 PROCEDURE — 99999 PR PBB SHADOW E&M-EST. PATIENT-LVL IV: ICD-10-PCS | Mod: PBBFAC,HCNC,TXP, | Performed by: INTERNAL MEDICINE

## 2023-03-16 PROCEDURE — 86977 RBC SERUM PRETX INCUBJ/INHIB: CPT | Mod: HCNC,PO,TXP | Performed by: NURSE PRACTITIONER

## 2023-03-16 PROCEDURE — 3044F PR MOST RECENT HEMOGLOBIN A1C LEVEL <7.0%: ICD-10-PCS | Mod: CPTII,S$GLB,TXP, | Performed by: INTERNAL MEDICINE

## 2023-03-16 PROCEDURE — 3078F PR MOST RECENT DIASTOLIC BLOOD PRESSURE < 80 MM HG: ICD-10-PCS | Mod: CPTII,S$GLB,TXP, | Performed by: INTERNAL MEDICINE

## 2023-03-16 PROCEDURE — 99204 PR OFFICE/OUTPT VISIT, NEW, LEVL IV, 45-59 MIN: ICD-10-PCS | Mod: S$GLB,TXP,, | Performed by: INTERNAL MEDICINE

## 2023-03-16 PROCEDURE — 82955 ASSAY OF G6PD ENZYME: CPT | Mod: HCNC,TXP | Performed by: NURSE PRACTITIONER

## 2023-03-16 PROCEDURE — 3066F PR DOCUMENTATION OF TREATMENT FOR NEPHROPATHY: ICD-10-PCS | Mod: CPTII,S$GLB,TXP, | Performed by: INTERNAL MEDICINE

## 2023-03-16 PROCEDURE — 36415 COLL VENOUS BLD VENIPUNCTURE: CPT | Mod: HCNC,TXP | Performed by: NURSE PRACTITIONER

## 2023-03-16 NOTE — PROGRESS NOTES
[facility] Consultation Note    Reason for Consult:  The primary encounter diagnosis was Chronic diarrhea. Diagnoses of Organ transplant candidate, Anemia, unspecified type, Pulmonary hypertension, BMI 34.0-34.9,adult, and Elevated serum creatinine were also pertinent to this visit.    PCP:   Elan Price   [address] [facility] [address]    Referring MD:  Maria Del Carmen Holland, Np  [address] [place] [address]    Initial History of Present Illness (HPI):  This is a 63 y.o. female here for evaluation of pre renal transplant evaluation patient has chronic renal insufficiency not on dialysis currently but being evaluated for dialysis has chronic fatigue no GI bleeding for about a year she is been having 3-4 bowel movements a day large volume watery no blood in her stool no oral no grease no fat occasional nausea but no vomiting no chest pain no shortness of breath no dysuria urgency frequency or hematuria she says she still makes urine large amount no dysphagia or odynophagia no real GERD symptoms no family history of GI malignancies no FAP no attenuated FAP no MA P no Pascal syndrome nobody with celiac sprue or inflammatory bowel disease not on a gluten free diet not all lactose-free diet does do some artificial sweeteners in the form of diet soda every other day she does notice that diet Coke does give her more diarrhea we did ask her if she could avoid that no unintentional weight loss she says she goes up she goes down she goes up she goes down but overall stable nobody with esophageal or stomach cancer in the family nobody with pancreatitis or pancreatic cancer in the family patient has never had pancreatitis as far as she knows she did have her gallbladder out maybe 4 or 5 years ago she says but her diarrhea did not start until about a year ago no recent travel no sick contacts no drinking out of river streams or wells.  Nobody with small-bowel or colon cancer  nobody with advanced colon adenomas polyps nobody with ovarian or uterine cancer nobody with kidney or small-bowel cancer in her family.  No flushing wheezing or palpitations.    Abdominal pain - no  Reflux - history of GERD but not on a PPI not symptomatic currently  Dysphagia - no   Bowel habits - as above  GI bleeding - none  NSAID usage - none    Interval HPI 03/16/2023:  The patient's last visit with me was on Visit date not found.      ROS:  Constitutional: No fevers, chills, No weight loss  ENT:  As above heartburn no dysphagia no odynophagia no hoarseness  CV: No chest pain, no palpitation  Pulm: No cough, No shortness of breath, no wheezing  Ophtho: No vision changes  GI: see HPI  Derm: No rash, no itching  Heme: No lymphadenopathy  MSK:  Some chronic arthritis  : No dysuria, No hematuria  Endo: No hot or cold intolerance  Neuro: No syncope, No seizure, no strokes  Psych: No uncontrolled anxiety, No uncontrolled depression    Medical History:  has a past medical history of Acute respiratory failure with hypoxia and hypercapnia (05/26/2019), ATN (acute tubular necrosis) (02/08/2019), Chronic diastolic heart failure (02/07/2019), CKD (chronic kidney disease) stage 4, GFR 15-29 ml/min (05/16/2018), CKD stage G3a/A3, GFR 45-59 and albumin creatinine ratio >300 mg/g (05/16/2018), CKD stage G3b/A3, GFR 30-44 and albumin creatinine ratio >300 mg/g (05/16/2018), Closed compression fracture of L2 lumbar vertebra (05/24/2019), Closed compression fracture of L2 lumbar vertebra (05/24/2019), Controlled type 2 diabetes with retinopathy (04/09/2018), COVID-19 virus infection 7-1-2021 (07/01/2021), CVA (cerebral vascular accident) (2017), Diabetic polyneuropathy associated with type 2 diabetes mellitus (12/04/2020), Encounter for blood transfusion, Essential hypertension (04/09/2018), 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), Gastroesophageal reflux disease without esophagitis (05/24/2019), Generalized anxiety disorder  (11/08/2019), GERD (gastroesophageal reflux disease), GIB (gastrointestinal bleeding) (10/18/2018), History of CVA (cerebrovascular accident) without residual deficits (11/27/2022), History of hepatitis C, Hypertensive retinopathy, bilateral (12/10/2019), Mild nonproliferative diabetic retinopathy (05/02/2018), Mixed hyperlipidemia (04/09/2018), Myalgia due to statin (09/07/2022), Normocytic anemia (01/14/2022), NS (nuclear sclerosis), bilateral (12/10/2019), PNA (pneumonia) (05/28/2019), Postoperative hypothyroidism, Renovascular hypertension (04/09/2018), S/P kyphoplasty (08/14/2019), Slow transit constipation (05/27/2019), Type 2 diabetes mellitus with circulatory disorder, with long-term current use of insulin (04/09/2018), Type 2 diabetes mellitus with stage 3 chronic kidney disease, without long-term current use of insulin (04/09/2018), Type 2 diabetes mellitus with stage 3a chronic kidney disease, without long-term current use of insulin (03/03/2021), and Type 2 diabetes mellitus with stage 3b chronic kidney disease, without long-term current use of insulin (09/07/2022).    Surgical History:  has a past surgical history that includes Hysterectomy; Back surgery (2017); Tonsillectomy; Colonoscopy (11/13/2015); Oophorectomy (1996); Thyroidectomy (2016); Cholecystectomy (2015); Fixation Kyphoplasty (Bilateral, 08/14/2019); AV fistula placement (Left, 10/27/2022); Percutaneous transluminal angioplasty of arteriovenous fistula (Left, 12/16/2022); Fistulogram (N/A, 12/16/2022); and Transposition of basilic vein (Left, 1/31/2023).    Family History: family history includes Aneurysm in her brother; Coronary artery disease in her mother; Diabetes type II in her mother; Heart disease in her mother; Hypertension in her father and mother; Prostate cancer in her father; Stroke in her mother..     Social History:  reports that she has never smoked. She has never used smokeless tobacco. She reports that she does not  "currently use alcohol. She reports that she does not use drugs.    Review of patient's allergies indicates:   Allergen Reactions    Atorvastatin Other (See Comments)     Statin induced myalgia    Mushroom Hives    Peanut Anaphylaxis    Bactrim [sulfamethoxazole-trimethoprim] Hives    Gabapentin      Pruritis     Iodine     Peanut butter flavor     Penicillins Hives    Shellfish containing products Itching    Sulfa (sulfonamide antibiotics) Hives       Medication List with Changes/Refills   Current Medications    CALCITRIOL (ROCALTROL) 0.25 MCG CAP    Take 1 capsule (0.25 mcg total) by mouth once daily.    CARVEDILOL (COREG) 25 MG TABLET    Take 1 tablet (25 mg total) by mouth 2 (two) times daily with meals.    CHOLECALCIFEROL, VITAMIN D3, 125 MCG (5,000 UNIT) TAB    Take 1 tablet (5,000 Units total) by mouth once daily.    ESCITALOPRAM OXALATE (LEXAPRO) 10 MG TABLET    Take 1 tablet (10 mg total) by mouth once daily.    FUROSEMIDE (LASIX) 40 MG TABLET    Take 2 tablets (80 mg total) by mouth 2 (two) times a day.    LEVOCETIRIZINE (XYZAL) 5 MG TABLET    Take 1 tablet (5 mg total) by mouth daily as needed for Allergies.    MICONAZOLE (MICOTIN) 2 % CREAM    Apply topically 2 (two) times daily to Bilateral feet    MONTELUKAST (SINGULAIR) 5 MG CHEWABLE TABLET    Chew and swallow 1 tablet (5 mg total) by mouth every evening.    NIFEDIPINE (PROCARDIA-XL) 60 MG (OSM) 24 HR TABLET    Take 1 tablet (60 mg total) by mouth nightly.    OXYCODONE (ROXICODONE) 5 MG IMMEDIATE RELEASE TABLET    Take 1 tablet (5 mg total) by mouth every 4 (four) hours as needed for Pain.    SYNTHROID 200 MCG TABLET    Take 1 tablet (200 mcg total) by mouth before breakfast.    TRAZODONE (DESYREL) 50 MG TABLET    Take 1 to 2 tablets ( mg total) by mouth nightly as needed for Insomnia.         Objective Findings:    Vital Signs:  /74 (BP Location: Right arm, Patient Position: Sitting, BP Method: Large (Automatic))   Pulse 70   Ht 5' 9" " (1.753 m)   Wt 105.7 kg (233 lb 0.4 oz)   BMI 34.41 kg/m²   Body mass index is 34.41 kg/m².    Physical Exam:  General Appearance: Well appearing in no acute distress  Eyes:    No scleral icterus  ENT:  No lesions or masses, wears dentures   Lungs: CTA bilaterally, no wheezes, no rhonchi, no rales  Heart:  S1, S2 normal, no murmurs heard  Abdomen:  Non distended, soft, no guarding, no rebound, no tenderness, no appreciated ascites, no bruits, no hepatosplenomegaly,  No CVA tenderness, small reducible umbilical hernia nontender, no Oliveira sign, no McBurney point tenderness  Musculoskeletal:  No major joint deformities, left upper arm recently placed AV fistula with a good thrill no erythema not tender no crepitance necrosis or induration or fluctuance.  Skin: No petechiae or rash on exposed skin areas  Neurologic:  Alert and oriented x4  Psychiatric:  Normal speech mentation and affect    Labs:  Lab Results   Component Value Date    WBC 11.40 02/10/2023    HGB 8.6 (L) 02/10/2023    HCT 28.9 (L) 02/10/2023     02/10/2023    CHOL 179 11/17/2022    TRIG 102 11/17/2022    HDL 53 11/17/2022    ALT 5 (L) 02/01/2023    AST 22 02/01/2023     02/10/2023    K 3.7 02/10/2023     02/10/2023    CREATININE 4.0 (H) 02/10/2023    BUN 43 (H) 02/10/2023    CO2 25 02/10/2023    TSH 43.212 (H) 09/11/2022    INR 1.1 12/21/2022    HGBA1C 5.2 02/02/2023           Medical Decision Making:  Lab work reviewed  Prior colonoscopy 2015 normal reviewed by Dr. Chino Herbert  EGD colonoscopy talk given  Lab work talk given stool studies talk given  Diarrhea talk given  Post cholecystectomy diarrhea talk given      Assessment:  1. Chronic diarrhea    2. Organ transplant candidate    3. Anemia, unspecified type    4. Pulmonary hypertension    5. BMI 34.0-34.9,adult    6. Elevated serum creatinine         Recommendations:  1. Lab work today  2. Stool studies  3. EGD colonoscopy for chronic diarrhea anemia and pre renal transplant  evaluation.  Patient with pulmonary artery pressure 58 will need 2nd floor endoscopy  4. Return to GI clinic 8 weeks for follow-up okay for telemedicine video visit    No follow-ups on file.      Order summary:  Orders Placed This Encounter    Clostridium difficile EIA    Stool culture    Strongyloides IgG Antibodies    Anti-Ent. Histolytica Ab    Micropsoridia species, PCR    Cyclospora    Pancreatic elastase, fecal    Fecal fat, qualitative    Giardia / Cryptosporidum, EIA    Stool Exam-Ova,Cysts,Parasites    Iron and TIBC    Ferritin    Hemoglobin    TISSUE TRANSGLUTAMINASE (TTG), IGA    IgA    Ambulatory referral/consult to Endo Procedure          Thank you so much for allowing me to participate in the care of Kathie Quezada    Victorino Luna MD    DISCLAIMER: This note was prepared with [employer] voice recognition transcription software. Garbled syntax, mangled or inadvertent pronouns, and other bizarre constructions may be attributed to that software system. While efforts were made to correct any mistakes made by this voice recognition program, some errors and/or omissions may remain in the note that were missed when the note was originally created.

## 2023-03-16 NOTE — TELEPHONE ENCOUNTER
Patient in office to schedule EGD and Colonoscopy. She is a organ transplant candidate    She wants lower volume prep.    Patient scheduled with Dr. Luna on 4/18/23.    Given mirilax and Gatorade prep in office and reviewed with her.     Instructions also in portal

## 2023-03-19 DIAGNOSIS — D50.9 IRON DEFICIENCY ANEMIA, UNSPECIFIED IRON DEFICIENCY ANEMIA TYPE: Primary | ICD-10-CM

## 2023-03-19 RX ORDER — FERROUS GLUCONATE 324(38)MG
324 TABLET ORAL
Qty: 90 TABLET | Refills: 1 | Status: SHIPPED | OUTPATIENT
Start: 2023-03-19 | End: 2023-05-28 | Stop reason: SDUPTHER

## 2023-03-20 LAB — G6PD RBC-CCNT: 10 U/G HB (ref 8–11.9)

## 2023-03-22 ENCOUNTER — TELEPHONE (OUTPATIENT)
Dept: VASCULAR SURGERY | Facility: CLINIC | Age: 63
End: 2023-03-22
Payer: MEDICARE

## 2023-03-22 NOTE — PRE-PROCEDURE INSTRUCTIONS
PreOp Instructions given:   - Verbal medication information (what to hold and what to take)   - NPO guidelines   - Arrival place directions given; time to be given the day before procedure by the   Surgeon's Office 0900 DOSC  - Bathing with antibacterial soap   - Don't wear any jewelry or bring any valuables AM of surgery   - No makeup or moisturizer to face   - No perfume/cologne, powder, lotions or aftershave   Pt. verbalized understanding.   Pt denies any h/o Anesthesia/Sedation complications or side effects.  Factor XI deficiency -

## 2023-03-22 NOTE — TELEPHONE ENCOUNTER
Spoke with the pt and informed her of the time of arrival for procedure tomorrow is 9am at the main campus on [address] [facility],second floor DOS.Pt also informd not to eat or drink anything after midnight and he verbalizes understanding of information received

## 2023-03-23 ENCOUNTER — ANESTHESIA EVENT (OUTPATIENT)
Dept: SURGERY | Facility: HOSPITAL | Age: 63
End: 2023-03-23
Payer: MEDICARE

## 2023-03-23 ENCOUNTER — ANESTHESIA (OUTPATIENT)
Dept: SURGERY | Facility: HOSPITAL | Age: 63
End: 2023-03-23
Payer: MEDICARE

## 2023-03-23 ENCOUNTER — HOSPITAL ENCOUNTER (OUTPATIENT)
Facility: HOSPITAL | Age: 63
Discharge: HOME | End: 2023-03-23
Attending: SURGERY | Admitting: SURGERY
Payer: MEDICARE

## 2023-03-23 VITALS
HEART RATE: 65 BPM | DIASTOLIC BLOOD PRESSURE: 67 MMHG | BODY MASS INDEX: 33.67 KG/M2 | SYSTOLIC BLOOD PRESSURE: 134 MMHG | WEIGHT: 228 LBS | TEMPERATURE: 98 F | OXYGEN SATURATION: 100 % | RESPIRATION RATE: 18 BRPM

## 2023-03-23 DIAGNOSIS — T82.898A AV FISTULA OCCLUSION: ICD-10-CM

## 2023-03-23 DIAGNOSIS — N18.5 CHRONIC KIDNEY DISEASE (CKD), STAGE V: Primary | Chronic | ICD-10-CM

## 2023-03-23 LAB
POCT GLUCOSE: 103 MG/DL (ref 70–110)
POCT GLUCOSE: 105 MG/DL (ref 70–110)

## 2023-03-23 PROCEDURE — C1894 INTRO/SHEATH, NON-LASER: HCPCS | Mod: TXP | Performed by: SURGERY

## 2023-03-23 PROCEDURE — D9220A PRA ANESTHESIA: ICD-10-PCS | Mod: CRNA,NTX,, | Performed by: NURSE ANESTHETIST, CERTIFIED REGISTERED

## 2023-03-23 PROCEDURE — 82962 GLUCOSE BLOOD TEST: CPT | Mod: TXP | Performed by: SURGERY

## 2023-03-23 PROCEDURE — 36000707: Mod: TXP | Performed by: SURGERY

## 2023-03-23 PROCEDURE — 71000044 HC DOSC ROUTINE RECOVERY FIRST HOUR: Mod: NTX | Performed by: SURGERY

## 2023-03-23 PROCEDURE — 63600175 PHARM REV CODE 636 W HCPCS: Mod: NTX | Performed by: SURGERY

## 2023-03-23 PROCEDURE — C1769 GUIDE WIRE: HCPCS | Mod: TXP | Performed by: SURGERY

## 2023-03-23 PROCEDURE — 63600175 PHARM REV CODE 636 W HCPCS: Mod: TXP | Performed by: NURSE ANESTHETIST, CERTIFIED REGISTERED

## 2023-03-23 PROCEDURE — 82962 GLUCOSE BLOOD TEST: CPT | Mod: NTX | Performed by: SURGERY

## 2023-03-23 PROCEDURE — D9220A PRA ANESTHESIA: Mod: CRNA,NTX,, | Performed by: NURSE ANESTHETIST, CERTIFIED REGISTERED

## 2023-03-23 PROCEDURE — 37000009 HC ANESTHESIA EA ADD 15 MINS: Mod: TXP | Performed by: SURGERY

## 2023-03-23 PROCEDURE — 25000003 PHARM REV CODE 250: Mod: TXP | Performed by: SURGERY

## 2023-03-23 PROCEDURE — 25000003 PHARM REV CODE 250: Mod: NTX | Performed by: NURSE ANESTHETIST, CERTIFIED REGISTERED

## 2023-03-23 PROCEDURE — 27201423 OPTIME MED/SURG SUP & DEVICES STERILE SUPPLY: Mod: TXP | Performed by: SURGERY

## 2023-03-23 PROCEDURE — 37000008 HC ANESTHESIA 1ST 15 MINUTES: Mod: NTX | Performed by: SURGERY

## 2023-03-23 PROCEDURE — 36902 PR INTRO CATH, DIALYSIS CIRCUIT W/TRANSLML BALLOON ANGIO: ICD-10-PCS | Mod: TXP,,, | Performed by: SURGERY

## 2023-03-23 PROCEDURE — 71000016 HC POSTOP RECOV ADDL HR: Mod: NTX | Performed by: SURGERY

## 2023-03-23 PROCEDURE — 36000706: Mod: NTX | Performed by: SURGERY

## 2023-03-23 PROCEDURE — C2623 CATH, TRANSLUMIN, DRUG-COAT: HCPCS | Mod: TXP | Performed by: SURGERY

## 2023-03-23 PROCEDURE — 36902 INTRO CATH DIALYSIS CIRCUIT: CPT | Mod: TXP,,, | Performed by: SURGERY

## 2023-03-23 PROCEDURE — D9220A PRA ANESTHESIA: Mod: ANES,NTX,, | Performed by: ANESTHESIOLOGY

## 2023-03-23 PROCEDURE — D9220A PRA ANESTHESIA: ICD-10-PCS | Mod: ANES,NTX,, | Performed by: ANESTHESIOLOGY

## 2023-03-23 PROCEDURE — 71000015 HC POSTOP RECOV 1ST HR: Mod: NTX | Performed by: SURGERY

## 2023-03-23 RX ORDER — ONDANSETRON 2 MG/ML
4 INJECTION INTRAMUSCULAR; INTRAVENOUS EVERY 12 HOURS PRN
Status: DISCONTINUED | OUTPATIENT
Start: 2023-03-23 | End: 2023-03-23 | Stop reason: HOSPADM

## 2023-03-23 RX ORDER — HYDROCODONE BITARTRATE AND ACETAMINOPHEN 5; 325 MG/1; MG/1
1 TABLET ORAL EVERY 4 HOURS PRN
Status: DISCONTINUED | OUTPATIENT
Start: 2023-03-23 | End: 2023-03-23 | Stop reason: HOSPADM

## 2023-03-23 RX ORDER — FENTANYL CITRATE 50 UG/ML
INJECTION, SOLUTION INTRAMUSCULAR; INTRAVENOUS
Status: DISCONTINUED | OUTPATIENT
Start: 2023-03-23 | End: 2023-03-23

## 2023-03-23 RX ORDER — MUPIROCIN 20 MG/G
OINTMENT TOPICAL 2 TIMES DAILY
Status: DISCONTINUED | OUTPATIENT
Start: 2023-03-23 | End: 2023-03-23 | Stop reason: HOSPADM

## 2023-03-23 RX ORDER — PROPOFOL 10 MG/ML
VIAL (ML) INTRAVENOUS CONTINUOUS PRN
Status: DISCONTINUED | OUTPATIENT
Start: 2023-03-23 | End: 2023-03-23

## 2023-03-23 RX ORDER — LIDOCAINE HYDROCHLORIDE 10 MG/ML
INJECTION INFILTRATION; PERINEURAL
Status: DISCONTINUED | OUTPATIENT
Start: 2023-03-23 | End: 2023-03-23 | Stop reason: HOSPADM

## 2023-03-23 RX ORDER — HEPARIN SODIUM 1000 [USP'U]/ML
INJECTION, SOLUTION INTRAVENOUS; SUBCUTANEOUS
Status: DISCONTINUED | OUTPATIENT
Start: 2023-03-23 | End: 2023-03-23 | Stop reason: HOSPADM

## 2023-03-23 RX ORDER — NITROGLYCERIN 5 MG/ML
INJECTION, SOLUTION INTRAVENOUS
Status: DISCONTINUED | OUTPATIENT
Start: 2023-03-23 | End: 2023-03-23 | Stop reason: HOSPADM

## 2023-03-23 RX ORDER — MIDAZOLAM HYDROCHLORIDE 1 MG/ML
INJECTION, SOLUTION INTRAMUSCULAR; INTRAVENOUS
Status: DISCONTINUED | OUTPATIENT
Start: 2023-03-23 | End: 2023-03-23

## 2023-03-23 RX ORDER — PHENYLEPHRINE HYDROCHLORIDE 10 MG/ML
INJECTION INTRAVENOUS
Status: DISCONTINUED | OUTPATIENT
Start: 2023-03-23 | End: 2023-03-23

## 2023-03-23 RX ORDER — SODIUM CHLORIDE 9 MG/ML
INJECTION, SOLUTION INTRAVENOUS CONTINUOUS
Status: DISCONTINUED | OUTPATIENT
Start: 2023-03-23 | End: 2023-03-23 | Stop reason: HOSPADM

## 2023-03-23 RX ORDER — HEPARIN SODIUM 1000 [USP'U]/ML
INJECTION, SOLUTION INTRAVENOUS; SUBCUTANEOUS
Status: DISCONTINUED | OUTPATIENT
Start: 2023-03-23 | End: 2023-03-23

## 2023-03-23 RX ORDER — VERAPAMIL HYDROCHLORIDE 2.5 MG/ML
INJECTION, SOLUTION INTRAVENOUS
Status: DISCONTINUED | OUTPATIENT
Start: 2023-03-23 | End: 2023-03-23 | Stop reason: HOSPADM

## 2023-03-23 RX ORDER — CLOPIDOGREL BISULFATE 75 MG/1
75 TABLET ORAL DAILY
Status: DISCONTINUED | OUTPATIENT
Start: 2023-03-23 | End: 2023-03-23 | Stop reason: HOSPADM

## 2023-03-23 RX ADMIN — HYDROCODONE BITARTRATE AND ACETAMINOPHEN 1 TABLET: 5; 325 TABLET ORAL at 01:03

## 2023-03-23 RX ADMIN — SODIUM CHLORIDE: 0.9 INJECTION, SOLUTION INTRAVENOUS at 10:03

## 2023-03-23 RX ADMIN — MIDAZOLAM HYDROCHLORIDE 2 MG: 1 INJECTION, SOLUTION INTRAMUSCULAR; INTRAVENOUS at 10:03

## 2023-03-23 RX ADMIN — HEPARIN SODIUM 9000 UNITS: 1000 INJECTION, SOLUTION INTRAVENOUS; SUBCUTANEOUS at 11:03

## 2023-03-23 RX ADMIN — PHENYLEPHRINE HYDROCHLORIDE 100 MCG: 10 INJECTION INTRAVENOUS at 11:03

## 2023-03-23 RX ADMIN — FENTANYL CITRATE 100 MCG: 50 INJECTION, SOLUTION INTRAMUSCULAR; INTRAVENOUS at 10:03

## 2023-03-23 RX ADMIN — PROPOFOL 75 MCG/KG/MIN: 10 INJECTION, EMULSION INTRAVENOUS at 11:03

## 2023-03-23 NOTE — BRIEF OP NOTE
Brief Operative Note  Date: 03/23/2023    Surgeon:  Tirso Tavera MD [facility]    Pre-op Diagnosis:  Threatended AVF; evidence of stenosis  T82.858A: Stenosis of vascular prosthetic devices, implants and grafts, initial encounter    Post-op Diagnosis:  Same    Procedure(s):  1) U/S-guided L transradial access; L BVT AVF fistulagram  2) PTA proximal diffuse stenoses with 6x80mm DCB (Lutonix) balloon    Anesthesia: Local MAC    Findings/Key Components:  Successful treatment of proximal stenosis  Good palpable thrill    EBL: < 10 ml    Anesthesia: IV regional    Findings: Resolution of stenosis; palpable thrill           Complications:  None; patient tolerated the procedure well.           Disposition: Recoery- hemodynamically stable in good condition    Attending Attestation: I was present and scrubbed for the entire procedure.      Specimens (From admission, onward)      None          I attest to being present for the procedure and performing the case.  Tirso Tavera MD [facility]  Discharge Note  SUMMARY    Admit Date: 3/23/2023    Attending Physician: Tirso Tavera MD [facility]    Discharge Physician: Tirso Tavera MD [facility]    Discharge Date: 03/23/2023    Final Diagnosis: Arteriovenous fistula occlusion, initial encounter [T82.898A]    Outcome of Treatment: Successful PTA    Disposition: Home or self-care    Patient Instructions:   Current Discharge Medication List        CONTINUE these medications which have NOT CHANGED    Details   calcitRIOL (ROCALTROL) 0.25 MCG Cap Take 1 capsule (0.25 mcg total) by mouth once daily.  Qty: 90 capsule, Refills: 1    Associated Diagnoses: Secondary hyperparathyroidism of renal origin      carvediloL (COREG) 25 MG tablet Take 1 tablet (25 mg total) by mouth 2 (two) times daily with meals.  Qty: 180 tablet, Refills: 3    Comments: .  Associated Diagnoses: Chronic diastolic heart failure; Renovascular hypertension      cholecalciferol, vitamin D3, 125 mcg (5,000 unit) Tab Take 1  tablet (5,000 Units total) by mouth once daily.  Qty: 90 tablet, Refills: 3    Associated Diagnoses: Vitamin D deficiency      EScitalopram oxalate (LEXAPRO) 10 MG tablet Take 1 tablet (10 mg total) by mouth once daily.  Qty: 90 tablet, Refills: 3    Associated Diagnoses: Generalized anxiety disorder      ferrous gluconate (FERGON) 324 MG tablet Take 1 tablet (324 mg total) by mouth daily with breakfast.  Qty: 90 tablet, Refills: 1    Associated Diagnoses: Iron deficiency anemia, unspecified iron deficiency anemia type      furosemide (LASIX) 40 MG tablet Take 2 tablets (80 mg total) by mouth 2 (two) times a day.  Qty: 120 tablet, Refills: 3    Associated Diagnoses: Chronic diastolic heart failure; Chronic kidney disease (CKD), stage V      levocetirizine (XYZAL) 5 MG tablet Take 1 tablet (5 mg total) by mouth daily as needed for Allergies.  Qty: 90 tablet, Refills: 3    Associated Diagnoses: Chronic allergic rhinitis      montelukast (SINGULAIR) 5 MG chewable tablet Chew and swallow 1 tablet (5 mg total) by mouth every evening.  Qty: 30 tablet, Refills: 0    Associated Diagnoses: Chronic allergic rhinitis      NIFEdipine (PROCARDIA-XL) 60 MG (OSM) 24 hr tablet Take 1 tablet (60 mg total) by mouth nightly.  Qty: 30 tablet, Refills: 11    Comments: .      SYNTHROID 200 mcg tablet Take 1 tablet (200 mcg total) by mouth before breakfast.  Qty: 90 tablet, Refills: 3    Associated Diagnoses: Postoperative hypothyroidism      traZODone (DESYREL) 50 MG tablet Take 1 to 2 tablets ( mg total) by mouth nightly as needed for Insomnia.  Qty: 120 tablet, Refills: 3    Associated Diagnoses: Generalized anxiety disorder      miconazole (MICOTIN) 2 % cream Apply topically 2 (two) times daily to Bilateral feet  Qty: 57 g, Refills: 2      oxyCODONE (ROXICODONE) 5 MG immediate release tablet Take 1 tablet (5 mg total) by mouth every 4 (four) hours as needed for Pain.  Qty: 15 tablet, Refills: 0    Comments: Quantity prescribed  more than 7 day supply? No             Diet:  Resume pre-operative diet    Activity:  Ad oli    Follow-up:  Follow-up in clinic with Dr Tavera within 4 weeks; please call clinic nurse at

## 2023-03-23 NOTE — OP NOTE
Date: 03/23/2023    Surgeon:  MD SHADE Harris FACS    Pre-op Diagnosis:  Threatended AVF; evidence of stenosis  T82.858A: Stenosis of vascular prosthetic devices, implants and grafts, initial encounter    Post-op Diagnosis:  Same    Procedure(s):  1) U/S-guided L transradial access; L BVT AVF fistulagram CO2  2) PTA proximal diffuse stenoses with 6x80mm DCB (Lutonix) balloon    Anesthesia: Local MAC    Findings/Key Components:  Successful treatment of proximal stenosis  Good palpable thrill  CO2 gas only used; no contast    EBL: < 10 ml    Anesthesia: IV regional    Findings: Resolution of stenosis; palpable thrill           Complications:  None; patient tolerated the procedure well.           Disposition: Recoery- hemodynamically stable in good condition    Attending Attestation: I was present and scrubbed for the entire procedure.    Procedure detail: The patient was brought to the interventional suite, placed in supine. Arm was prepped and draped in the standard surgical fashion. Under ultrasound guidance, the radial artery was accessed with a micropuncture needle; ultrasound confirmed vessel patency, followed by placement of 4/3-Prydeinig micropuncture dilator. Through this, an 0.018-inch wire was placed in a 5/6-Prydeinig transradial sheath. Through this, an 0.018-inch Glidewire was placed through the high-grade stenosis, which was demonstrated by the angiogram.  The ultrasound demonstrated vessel patency. CO2 gas only used; no contast.  A high-grade stenosis in proximal stenosis was found, which was crossed with a hyrophilic 0.018 in glidewire. This was treated first with TA proximal diffuse stenoses with 6x80mm DCB (Lutonix) balloon high-pressure, noncompliant balloon. Resolution of the stenosis was noted. Strong thrill could be felt. The sheath was removed and a trans-radial artery band placed with good hemostasis; hand was well-perfused and thrill palpable.    MD SHADE Carpenter FACS    Vascular/Endovascular Surgery

## 2023-03-23 NOTE — TRANSFER OF CARE
Anesthesia Transfer of Care Note    Patient: Kathie Quezada    Procedure(s) Performed: Procedure(s) (LRB):  FISTULOGRAM, WITH PTA (Left)    Patient location: PACU    Anesthesia Type: general    Transport from OR: Transported from OR on 6-10 L/min O2 by face mask with adequate spontaneous ventilation    Post pain: adequate analgesia    Post assessment: no apparent anesthetic complications    Post vital signs: stable    Level of consciousness: awake and alert    Nausea/Vomiting: no nausea/vomiting    Complications: none    Transfer of care protocol was followed      Last vitals:   Visit Vitals  /86 (BP Location: Right arm, Patient Position: Lying)   Pulse 64   Temp 36.6 °C (97.9 °F) (Oral)   Resp 18   Wt 103.4 kg (228 lb)   SpO2 99%   Breastfeeding No   BMI 33.67 kg/m²

## 2023-03-24 NOTE — ANESTHESIA POSTPROCEDURE EVALUATION
Anesthesia Post Evaluation    Patient: Kathie Quezada    Procedure(s) Performed: Procedure(s) (LRB):  FISTULOGRAM, WITH PTA (Left)    Final Anesthesia Type: general      Patient location during evaluation: PACU  Patient participation: Yes- Able to Participate  Level of consciousness: awake and alert and oriented  Post-procedure vital signs: reviewed and stable  Pain management: adequate  Airway patency: patent    PONV status at discharge: No PONV  Anesthetic complications: no      Cardiovascular status: blood pressure returned to baseline, hemodynamically stable and stable  Respiratory status: unassisted, room air and spontaneous ventilation  Hydration status: euvolemic  Follow-up not needed.          Vitals Value Taken Time   /67 03/23/23 1400   Temp 36.6 °C (97.9 °F) 03/23/23 1400   Pulse 65 03/23/23 1400   Resp 18 03/23/23 1400   SpO2 100 % 03/23/23 1400         No case tracking events are documented in the log.      Pain/Neda Score: Pain Rating Prior to Med Admin: 4 (3/23/2023  2:04 PM)  Pain Rating Post Med Admin: 0 (3/23/2023  2:04 PM)  Neda Score: 9 (3/23/2023 12:30 PM)

## 2023-03-24 NOTE — ANESTHESIA PREPROCEDURE EVALUATION
03/24/2023  Pre-operative evaluation for Procedure(s) (LRB):  FISTULOGRAM, WITH PTA (Left)    Kathie Quezada is a 63 y.o. female     Patient Active Problem List   Diagnosis    Postoperative hypothyroidism    Renovascular hypertension    Mixed hyperlipidemia    Nephrotic range proteinuria    History of hepatitis C    Chronic diastolic heart failure    Factor XI deficiency    Vitamin D deficiency    S/P arteriovenous (AV) fistula creation    Generalized anxiety disorder    Class 2 severe obesity due to excess calories with serious comorbidity and body mass index (BMI) of 35.0 to 35.9 in adult    Diabetic polyneuropathy associated with type 2 diabetes mellitus    Controlled type 2 diabetes mellitus with stable proliferative retinopathy of both eyes, unspecified whether long term insulin use    Anemia in end-stage renal disease    Type 2 diabetes mellitus with stage 5 chronic kidney disease not on chronic dialysis, without long-term current use of insulin    Myalgia due to statin    Chronic kidney disease (CKD), stage V    Osteopenia of multiple sites    Chronic allergic rhinitis    Arteriovenous fistula    Iron deficiency anemia    Acute hypoxemic respiratory failure    Postoperative fever    Peanut allergy    Metabolic bone disease    Discharge planning     Pulmonary hypertension       Review of patient's allergies indicates:   Allergen Reactions    Atorvastatin Other (See Comments)     Statin induced myalgia    Mushroom Hives    Peanut Anaphylaxis    Bactrim [sulfamethoxazole-trimethoprim] Hives    Gabapentin      Pruritis     Iodine     Peanut butter flavor     Penicillins Hives    Shellfish containing products Itching    Sulfa (sulfonamide antibiotics) Hives       No current facility-administered medications on file prior to encounter.     Current Outpatient  Medications on File Prior to Encounter   Medication Sig Dispense Refill    calcitRIOL (ROCALTROL) 0.25 MCG Cap Take 1 capsule (0.25 mcg total) by mouth once daily. 90 capsule 1    carvediloL (COREG) 25 MG tablet Take 1 tablet (25 mg total) by mouth 2 (two) times daily with meals. 180 tablet 3    cholecalciferol, vitamin D3, 125 mcg (5,000 unit) Tab Take 1 tablet (5,000 Units total) by mouth once daily. 90 tablet 3    EScitalopram oxalate (LEXAPRO) 10 MG tablet Take 1 tablet (10 mg total) by mouth once daily. 90 tablet 3    furosemide (LASIX) 40 MG tablet Take 2 tablets (80 mg total) by mouth 2 (two) times a day. 120 tablet 3    levocetirizine (XYZAL) 5 MG tablet Take 1 tablet (5 mg total) by mouth daily as needed for Allergies. 90 tablet 3    montelukast (SINGULAIR) 5 MG chewable tablet Chew and swallow 1 tablet (5 mg total) by mouth every evening. 30 tablet 0    NIFEdipine (PROCARDIA-XL) 60 MG (OSM) 24 hr tablet Take 1 tablet (60 mg total) by mouth nightly. 30 tablet 11    SYNTHROID 200 mcg tablet Take 1 tablet (200 mcg total) by mouth before breakfast. 90 tablet 3    traZODone (DESYREL) 50 MG tablet Take 1 to 2 tablets ( mg total) by mouth nightly as needed for Insomnia. 120 tablet 3    miconazole (MICOTIN) 2 % cream Apply topically 2 (two) times daily to Bilateral feet 57 g 2    oxyCODONE (ROXICODONE) 5 MG immediate release tablet Take 1 tablet (5 mg total) by mouth every 4 (four) hours as needed for Pain. (Patient not taking: Reported on 3/22/2023) 15 tablet 0       Past Surgical History:   Procedure Laterality Date    AV FISTULA PLACEMENT Left 10/27/2022    Procedure: CREATION, AV FISTULA;  Surgeon: Tirso Tavera MD;  Location: [facility] OR [address] [place] [address];  Service: Peripheral Vascular;  Laterality: Left;  Brachio basilic 1st stage     BACK SURGERY  2017    CHOLECYSTECTOMY  2015    [place]    COLONOSCOPY  11/13/2015    Dr Chino torres prep. internal hemorrhoids. diverticulosis of sigmois. NO  polyps. repeat due 2025    FISTULOGRAM N/A 12/16/2022    Procedure: Fistulogram;  Surgeon: Tirso Tavera MD;  Location: [facility] CATH LAB;  Service: Peripheral Vascular;  Laterality: N/A;    FISTULOGRAM, WITH PTA Left 3/23/2023    Procedure: FISTULOGRAM, WITH PTA;  Surgeon: Tirso Tavera MD;  Location: [facility] OR 2ND FLR;  Service: Peripheral Vascular;  Laterality: Left;  LUE TRANSRADIAL CO2 FISTULOGRAM    1.9 min  22.99 mGy  4.3032 Gy.cm  200cc CO2  9ml TransRadial Solution    FIXATION KYPHOPLASTY Bilateral 08/14/2019    Procedure: L2 kyphoplasty Fluoro Globus;  Surgeon: Jeremie Kwon DO;  Location: [facility] OR;  Service: Neurosurgery;  Laterality: Bilateral;  GLOBUS MACRINA WINSLOW [phone] TEXTED HER @ 10:54AM ON 7-30-19  PRE-OP-BY RN  8-7-2019    HYSTERECTOMY      OOPHORECTOMY  1996    one    PERCUTANEOUS TRANSLUMINAL ANGIOPLASTY OF ARTERIOVENOUS FISTULA Left 12/16/2022    Procedure: PTA, AV FISTULA;  Surgeon: Tirso Tavera MD;  Location: [facility] CATH LAB;  Service: Peripheral Vascular;  Laterality: Left;    THY[facility]IDECTOMY  2016    at [facility] nodule    TONSILLECTOMY      TRANSPOSITION OF BASILIC VEIN Left 1/31/2023    Procedure: TRANSPOSITION, VEIN, BASILIC;  Surgeon: Tirso Tavera MD;  [facility];  Service: Peripheral Vascular;  Laterality: Left;       Social History     Socioeconomic History    Marital status:    Tobacco Use    Smoking status: Never    Smokeless tobacco: Never   Substance and Sexual Activity    Alcohol use: Not Currently    Drug use: No    Sexual activity: Not Currently   Social History Narrative    Pt enjoys cooking and baking and making Floral arrangement and Ker[employer]      Social Determinants of Health     Financial Resource Strain: Low Risk     Difficulty of Paying Living Expenses: Not hard at all   Food Insecurity: No Food Insecurity    Worried About Running Out of Food in the Last Year: Never true    Ran Out of Food in the Last Year: Never  true   Transportation Needs: No Transportation Needs    Lack of Transportation (Medical): No    Lack of Transportation (Non-Medical): No   Physical Activity: Unknown    Days of Exercise per Week: 1 day   Stress: No Stress Concern Present    Feeling of Stress : Only a little   Social Connections: Unknown    Frequency of Communication with Friends and Family: More than three times a week    Frequency of Social Gatherings with Friends and Family: More than three times a week    Active Member of Clubs or Organizations: Yes    Attends Club or Organization Meetings: 1 to 4 times per year    Marital Status:    Housing Stability: Low Risk     Unable to Pay for Housing in the Last Year: No    Number of Places Lived in the Last Year: 1    Unstable Housing in the Last Year: No         CBC: No results for input(s): WBC, RBC, HGB, HCT, PLT, MCV, MCH, MCHC in the last 72 hours.    CMP: No results for input(s): NA, K, CL, CO2, BUN, CREATININE, GLU, MG, PHOS, CALCIUM, ALBUMIN, PROT, ALKPHOS, ALT, AST, BILITOT in the last 72 hours.    INR  No results for input(s): PT, INR, PROTIME, APTT in the last 72 hours.        Diagnostic Studies:      EKD Echo:  No results found for this or any previous visit.        Pre-op Assessment    I have reviewed the Patient Summary Reports.     I have reviewed the Nursing Notes. I have reviewed the NPO Status.      Review of Systems  Anesthesia Hx:  No problems with previous Anesthesia  History of prior surgery of interest to airway management or planning: Denies Family Hx of Anesthesia complications.   Denies Personal Hx of Anesthesia complications.   Cardiovascular:   Hypertension    Pulmonary:   Denies COPD.  Denies Asthma.  Denies Sleep Apnea.    Renal/:   Chronic Renal Disease    Hepatic/GI:   GERD Liver Disease,    Neurological:   CVA Neuromuscular Disease,    Endocrine:   Diabetes Hypothyroidism    Psych:   Psychiatric History          Physical Exam  General: Well  nourished, Cooperative and Alert    Airway:  Mallampati: III / II  Mouth Opening: Normal  TM Distance: Normal  Tongue: Normal  Neck ROM: Normal ROM    Dental:  Intact    Chest/Lungs:  Clear to auscultation    Heart:  Rate: Normal  Rhythm: Regular Rhythm  Sounds: Normal        Anesthesia Plan  Type of Anesthesia, risks & benefits discussed:    Anesthesia Type: Gen Natural Airway  Intra-op Monitoring Plan: Standard ASA Monitors  Post Op Pain Control Plan: multimodal analgesia  Induction:  IV  Informed Consent: Informed consent signed with the Patient and all parties understand the risks and agree with anesthesia plan.  All questions answered.   ASA Score: 3  Day of Surgery Review of History & Physical: H&P Update referred to the surgeon/provider.    Ready For Surgery From Anesthesia Perspective.     .

## 2023-03-27 ENCOUNTER — OFFICE VISIT (OUTPATIENT)
Dept: NEPHROLOGY | Facility: CLINIC | Age: 63
End: 2023-03-27
Payer: MEDICARE

## 2023-03-27 ENCOUNTER — LAB VISIT (OUTPATIENT)
Dept: LAB | Facility: HOSPITAL | Age: 63
End: 2023-03-27
Attending: INTERNAL MEDICINE
Payer: MEDICARE

## 2023-03-27 VITALS
BODY MASS INDEX: 32.88 KG/M2 | OXYGEN SATURATION: 97 % | WEIGHT: 222.69 LBS | HEART RATE: 77 BPM | SYSTOLIC BLOOD PRESSURE: 91 MMHG | DIASTOLIC BLOOD PRESSURE: 70 MMHG

## 2023-03-27 DIAGNOSIS — D63.1 ANEMIA OF CHRONIC RENAL FAILURE, STAGE 5: ICD-10-CM

## 2023-03-27 DIAGNOSIS — N18.5 CHRONIC KIDNEY DISEASE (CKD), STAGE V: Primary | Chronic | ICD-10-CM

## 2023-03-27 DIAGNOSIS — N18.5 CHRONIC KIDNEY DISEASE (CKD), STAGE V: Chronic | ICD-10-CM

## 2023-03-27 DIAGNOSIS — N25.81 SECONDARY HYPERPARATHYROIDISM OF RENAL ORIGIN: ICD-10-CM

## 2023-03-27 DIAGNOSIS — I77.0 A-V FISTULA: ICD-10-CM

## 2023-03-27 DIAGNOSIS — D50.9 IRON DEFICIENCY ANEMIA, UNSPECIFIED IRON DEFICIENCY ANEMIA TYPE: ICD-10-CM

## 2023-03-27 DIAGNOSIS — N18.5 ANEMIA OF CHRONIC RENAL FAILURE, STAGE 5: ICD-10-CM

## 2023-03-27 DIAGNOSIS — E11.22 TYPE 2 DIABETES MELLITUS WITH STAGE 5 CHRONIC KIDNEY DISEASE NOT ON CHRONIC DIALYSIS, WITHOUT LONG-TERM CURRENT USE OF INSULIN: ICD-10-CM

## 2023-03-27 DIAGNOSIS — N18.5 TYPE 2 DIABETES MELLITUS WITH STAGE 5 CHRONIC KIDNEY DISEASE NOT ON CHRONIC DIALYSIS, WITHOUT LONG-TERM CURRENT USE OF INSULIN: ICD-10-CM

## 2023-03-27 LAB
25(OH)D3+25(OH)D2 SERPL-MCNC: 60 NG/ML (ref 30–96)
ALBUMIN SERPL BCP-MCNC: 3.6 G/DL (ref 3.5–5.2)
ALBUMIN SERPL BCP-MCNC: 3.6 G/DL (ref 3.5–5.2)
ALP SERPL-CCNC: 91 U/L (ref 55–135)
ALT SERPL W/O P-5'-P-CCNC: 11 U/L (ref 10–44)
ANION GAP SERPL CALC-SCNC: 8 MMOL/L (ref 8–16)
AST SERPL-CCNC: 16 U/L (ref 10–40)
BASOPHILS # BLD AUTO: 0.06 K/UL (ref 0–0.2)
BASOPHILS NFR BLD: 0.6 % (ref 0–1.9)
BILIRUB DIRECT SERPL-MCNC: 0.1 MG/DL (ref 0.1–0.3)
BILIRUB SERPL-MCNC: 0.3 MG/DL (ref 0.1–1)
BNP SERPL-MCNC: 84 PG/ML (ref 0–99)
BUN SERPL-MCNC: 47 MG/DL (ref 8–23)
CALCIUM SERPL-MCNC: 9.5 MG/DL (ref 8.7–10.5)
CHLORIDE SERPL-SCNC: 108 MMOL/L (ref 95–110)
CO2 SERPL-SCNC: 25 MMOL/L (ref 23–29)
CREAT SERPL-MCNC: 4 MG/DL (ref 0.5–1.4)
DIFFERENTIAL METHOD: ABNORMAL
EOSINOPHIL # BLD AUTO: 0.2 K/UL (ref 0–0.5)
EOSINOPHIL NFR BLD: 1.9 % (ref 0–8)
ERYTHROCYTE [DISTWIDTH] IN BLOOD BY AUTOMATED COUNT: 15.6 % (ref 11.5–14.5)
EST. GFR  (NO RACE VARIABLE): 12.1 ML/MIN/1.73 M^2
GLUCOSE SERPL-MCNC: 104 MG/DL (ref 70–110)
HCT VFR BLD AUTO: 33.6 % (ref 37–48.5)
HGB BLD-MCNC: 10.2 G/DL (ref 12–16)
IMM GRANULOCYTES # BLD AUTO: 0.04 K/UL (ref 0–0.04)
IMM GRANULOCYTES NFR BLD AUTO: 0.4 % (ref 0–0.5)
LYMPHOCYTES # BLD AUTO: 2.9 K/UL (ref 1–4.8)
LYMPHOCYTES NFR BLD: 27.1 % (ref 18–48)
MCH RBC QN AUTO: 24.6 PG (ref 27–31)
MCHC RBC AUTO-ENTMCNC: 30.4 G/DL (ref 32–36)
MCV RBC AUTO: 81 FL (ref 82–98)
MONOCYTES # BLD AUTO: 0.7 K/UL (ref 0.3–1)
MONOCYTES NFR BLD: 6.4 % (ref 4–15)
NEUTROPHILS # BLD AUTO: 6.9 K/UL (ref 1.8–7.7)
NEUTROPHILS NFR BLD: 63.6 % (ref 38–73)
NRBC BLD-RTO: 0 /100 WBC
PHOSPHATE SERPL-MCNC: 5.3 MG/DL (ref 2.7–4.5)
PLATELET # BLD AUTO: 189 K/UL (ref 150–450)
PMV BLD AUTO: 10.9 FL (ref 9.2–12.9)
POTASSIUM SERPL-SCNC: 4.3 MMOL/L (ref 3.5–5.1)
PROT SERPL-MCNC: 7.5 G/DL (ref 6–8.4)
PTH-INTACT SERPL-MCNC: 193 PG/ML (ref 9–77)
RBC # BLD AUTO: 4.15 M/UL (ref 4–5.4)
SODIUM SERPL-SCNC: 141 MMOL/L (ref 136–145)
WBC # BLD AUTO: 10.77 K/UL (ref 3.9–12.7)

## 2023-03-27 PROCEDURE — 99215 PR OFFICE/OUTPT VISIT, EST, LEVL V, 40-54 MIN: ICD-10-PCS | Mod: S$GLB,,, | Performed by: INTERNAL MEDICINE

## 2023-03-27 PROCEDURE — 36415 COLL VENOUS BLD VENIPUNCTURE: CPT | Mod: NTX | Performed by: INTERNAL MEDICINE

## 2023-03-27 PROCEDURE — 99999 PR PBB SHADOW E&M-EST. PATIENT-LVL III: CPT | Mod: PBBFAC,,, | Performed by: INTERNAL MEDICINE

## 2023-03-27 PROCEDURE — 84075 ASSAY ALKALINE PHOSPHATASE: CPT | Mod: TXP | Performed by: INTERNAL MEDICINE

## 2023-03-27 PROCEDURE — 82306 VITAMIN D 25 HYDROXY: CPT | Mod: NTX | Performed by: INTERNAL MEDICINE

## 2023-03-27 PROCEDURE — 1159F MED LIST DOCD IN RCRD: CPT | Mod: CPTII,S$GLB,, | Performed by: INTERNAL MEDICINE

## 2023-03-27 PROCEDURE — 85025 COMPLETE CBC W/AUTO DIFF WBC: CPT | Mod: NTX | Performed by: INTERNAL MEDICINE

## 2023-03-27 PROCEDURE — 3008F BODY MASS INDEX DOCD: CPT | Mod: CPTII,S$GLB,, | Performed by: INTERNAL MEDICINE

## 2023-03-27 PROCEDURE — 3066F NEPHROPATHY DOC TX: CPT | Mod: CPTII,S$GLB,, | Performed by: INTERNAL MEDICINE

## 2023-03-27 PROCEDURE — 1160F PR REVIEW ALL MEDS BY PRESCRIBER/CLIN PHARMACIST DOCUMENTED: ICD-10-PCS | Mod: CPTII,S$GLB,, | Performed by: INTERNAL MEDICINE

## 2023-03-27 PROCEDURE — 3044F PR MOST RECENT HEMOGLOBIN A1C LEVEL <7.0%: ICD-10-PCS | Mod: CPTII,S$GLB,, | Performed by: INTERNAL MEDICINE

## 2023-03-27 PROCEDURE — 83880 ASSAY OF NATRIURETIC PEPTIDE: CPT | Mod: TXP | Performed by: INTERNAL MEDICINE

## 2023-03-27 PROCEDURE — 3008F PR BODY MASS INDEX (BMI) DOCUMENTED: ICD-10-PCS | Mod: CPTII,S$GLB,, | Performed by: INTERNAL MEDICINE

## 2023-03-27 PROCEDURE — 3078F PR MOST RECENT DIASTOLIC BLOOD PRESSURE < 80 MM HG: ICD-10-PCS | Mod: CPTII,S$GLB,, | Performed by: INTERNAL MEDICINE

## 2023-03-27 PROCEDURE — 99999 PR PBB SHADOW E&M-EST. PATIENT-LVL III: ICD-10-PCS | Mod: PBBFAC,,, | Performed by: INTERNAL MEDICINE

## 2023-03-27 PROCEDURE — 1159F PR MEDICATION LIST DOCUMENTED IN MEDICAL RECORD: ICD-10-PCS | Mod: CPTII,S$GLB,, | Performed by: INTERNAL MEDICINE

## 2023-03-27 PROCEDURE — 3074F PR MOST RECENT SYSTOLIC BLOOD PRESSURE < 130 MM HG: ICD-10-PCS | Mod: CPTII,S$GLB,, | Performed by: INTERNAL MEDICINE

## 2023-03-27 PROCEDURE — 3074F SYST BP LT 130 MM HG: CPT | Mod: CPTII,S$GLB,, | Performed by: INTERNAL MEDICINE

## 2023-03-27 PROCEDURE — 80069 RENAL FUNCTION PANEL: CPT | Mod: NTX | Performed by: INTERNAL MEDICINE

## 2023-03-27 PROCEDURE — 1160F RVW MEDS BY RX/DR IN RCRD: CPT | Mod: CPTII,S$GLB,, | Performed by: INTERNAL MEDICINE

## 2023-03-27 PROCEDURE — 3066F PR DOCUMENTATION OF TREATMENT FOR NEPHROPATHY: ICD-10-PCS | Mod: CPTII,S$GLB,, | Performed by: INTERNAL MEDICINE

## 2023-03-27 PROCEDURE — 83970 ASSAY OF PARATHORMONE: CPT | Mod: NTX | Performed by: INTERNAL MEDICINE

## 2023-03-27 PROCEDURE — 3078F DIAST BP <80 MM HG: CPT | Mod: CPTII,S$GLB,, | Performed by: INTERNAL MEDICINE

## 2023-03-27 PROCEDURE — 99215 OFFICE O/P EST HI 40 MIN: CPT | Mod: S$GLB,,, | Performed by: INTERNAL MEDICINE

## 2023-03-27 PROCEDURE — 3044F HG A1C LEVEL LT 7.0%: CPT | Mod: CPTII,S$GLB,, | Performed by: INTERNAL MEDICINE

## 2023-03-27 RX ORDER — PARICALCITOL 1 UG/1
1 CAPSULE, LIQUID FILLED ORAL DAILY
Qty: 30 CAPSULE | Refills: 2 | Status: SHIPPED | OUTPATIENT
Start: 2023-03-27 | End: 2023-05-03

## 2023-03-27 NOTE — PROGRESS NOTES
REASON FOR VISIT: ckd    REFERRING PHYSICIAN: Elan Price MD    HISTORY OF PRESENT ILLNESS: 63 y.o. female who has long standing DMII with retinopathy, HTN, CVA, and multiple ARUNA's the last was from urinary retention. patient was referred here for abnormal renal function, her renal function has been declining due to above mentioned reasons, she used to see us in the clinic but lost follow up.    Was on insulin since joseph to around 2020, has been taken off since then.    No major complains during this clinic visit.    ALLERGIES:  Review of patient's allergies indicates:   Allergen Reactions    Atorvastatin Other (See Comments)     Statin induced myalgia    Mushroom Hives    Peanut Anaphylaxis    Bactrim [sulfamethoxazole-trimethoprim] Hives    Gabapentin      Pruritis     Iodine     Peanut butter flavor     Penicillins Hives    Shellfish containing products Itching    Sulfa (sulfonamide antibiotics) Hives       MEDICATIONS:    Current Outpatient Medications:     calcitRIOL (ROCALTROL) 0.25 MCG Cap, Take 1 capsule (0.25 mcg total) by mouth once daily., Disp: 90 capsule, Rfl: 1    carvediloL (COREG) 25 MG tablet, Take 1 tablet (25 mg total) by mouth 2 (two) times daily with meals., Disp: 180 tablet, Rfl: 3    cholecalciferol, vitamin D3, 125 mcg (5,000 unit) Tab, Take 1 tablet (5,000 Units total) by mouth once daily., Disp: 90 tablet, Rfl: 3    EScitalopram oxalate (LEXAPRO) 10 MG tablet, Take 1 tablet (10 mg total) by mouth once daily., Disp: 90 tablet, Rfl: 3    ferrous gluconate (FERGON) 324 MG tablet, Take 1 tablet (324 mg total) by mouth daily with breakfast., Disp: 90 tablet, Rfl: 1    furosemide (LASIX) 40 MG tablet, Take 2 tablets (80 mg total) by mouth 2 (two) times a day., Disp: 120 tablet, Rfl: 3    levocetirizine (XYZAL) 5 MG tablet, Take 1 tablet (5 mg total) by mouth daily as needed for Allergies., Disp: 90 tablet, Rfl: 3    miconazole (MICOTIN) 2 % cream, Apply topically 2 (two) times daily to  Bilateral feet, Disp: 57 g, Rfl: 2    montelukast (SINGULAIR) 5 MG chewable tablet, Chew and swallow 1 tablet (5 mg total) by mouth every evening., Disp: 30 tablet, Rfl: 0    NIFEdipine (PROCARDIA-XL) 60 MG (OSM) 24 hr tablet, Take 1 tablet (60 mg total) by mouth nightly., Disp: 30 tablet, Rfl: 11    oxyCODONE (ROXICODONE) 5 MG immediate release tablet, Take 1 tablet (5 mg total) by mouth every 4 (four) hours as needed for Pain., Disp: 15 tablet, Rfl: 0    SYNTHROID 200 mcg tablet, Take 1 tablet (200 mcg total) by mouth before breakfast., Disp: 90 tablet, Rfl: 3    traZODone (DESYREL) 50 MG tablet, Take 1 to 2 tablets ( mg total) by mouth nightly as needed for Insomnia., Disp: 120 tablet, Rfl: 3   Medication List with Changes/Refills   Current Medications    CALCITRIOL (ROCALTROL) 0.25 MCG CAP    Take 1 capsule (0.25 mcg total) by mouth once daily.    CARVEDILOL (COREG) 25 MG TABLET    Take 1 tablet (25 mg total) by mouth 2 (two) times daily with meals.    CHOLECALCIFEROL, VITAMIN D3, 125 MCG (5,000 UNIT) TAB    Take 1 tablet (5,000 Units total) by mouth once daily.    ESCITALOPRAM OXALATE (LEXAPRO) 10 MG TABLET    Take 1 tablet (10 mg total) by mouth once daily.    FERROUS GLUCONATE (FERGON) 324 MG TABLET    Take 1 tablet (324 mg total) by mouth daily with breakfast.    FUROSEMIDE (LASIX) 40 MG TABLET    Take 2 tablets (80 mg total) by mouth 2 (two) times a day.    LEVOCETIRIZINE (XYZAL) 5 MG TABLET    Take 1 tablet (5 mg total) by mouth daily as needed for Allergies.    MICONAZOLE (MICOTIN) 2 % CREAM    Apply topically 2 (two) times daily to Bilateral feet    MONTELUKAST (SINGULAIR) 5 MG CHEWABLE TABLET    Chew and swallow 1 tablet (5 mg total) by mouth every evening.    NIFEDIPINE (PROCARDIA-XL) 60 MG (OSM) 24 HR TABLET    Take 1 tablet (60 mg total) by mouth nightly.    OXYCODONE (ROXICODONE) 5 MG IMMEDIATE RELEASE TABLET    Take 1 tablet (5 mg total) by mouth every 4 (four) hours as needed for Pain.     SYNTHROID 200 MCG TABLET    Take 1 tablet (200 mcg total) by mouth before breakfast.    TRAZODONE (DESYREL) 50 MG TABLET    Take 1 to 2 tablets ( mg total) by mouth nightly as needed for Insomnia.        PHYSICAL EXAM:  BP 91/70   Pulse 77   Wt 101 kg (222 lb 10.6 oz)   SpO2 97%   BMI 32.88 kg/m²     General: No distress, No fever or chills  Neck: No adenopathy,no carotid bruit,no JVD  Lungs:Clear to auscultation bilaterally, No Crackles  Heart: Regular rate and rhythm, no murmur, gallops or rubs  Abdomen: Soft, no tenderness, bowel sounds normal  Extremities: Atraumatic, no edema in LE  Skin: Turgor normal. No rashes or ulcers  Neurologic: No focal weakness, oriented.  Dialysis Access: Non applicable         LABS:  Lab Results   Component Value Date    CREATININE 4.0 (H) 02/10/2023       Prot/Creat Ratio, Urine   Date Value Ref Range Status   05/27/2019 1.37 (H) 0.00 - 0.20 Final   02/21/2019 1.70 (H) 0.00 - 0.20 Final   08/23/2018 3.85 (H) 0.00 - 0.20 Final       Lab Results   Component Value Date     02/10/2023    K 3.7 02/10/2023    CO2 25 02/10/2023       last PTH   Lab Results   Component Value Date    .5 (H) 01/27/2023    CALCIUM 8.9 02/10/2023    PHOS 4.3 02/10/2023       Lab Results   Component Value Date    HGB 9.8 (L) 03/16/2023        Lab Results   Component Value Date    HGBA1C 5.2 02/02/2023       Lab Results   Component Value Date    LDLCALC 105.6 11/17/2022           ASSESSMENT:    CKD V  Left arm AV fistula - Thrill felt  Metabolic acidosis  H/0 urinary retension  -Cr baseline around 3 and progressing. 2018 - ANCA, JB, IF,HIV, cryo, Hep panel wnl (s/p Haarvoni Rx for Hep c in 2015)  -likely from DMII (with retinopathy) /uncontrolled HTN/PVD (CVA with residual weakness).  -UPCR 1.5 g/g, used to be in nephrotic range before  -Renal US showed 10.3 and 10.8 cm kidneys.  -Was on sodium bicarbonate 650 mg BID. Unclear it she is still taking it.  -Was on Lisinopril before not sure  "what prompted to stop it, Dont expect it to make a lot of difference though.  -Access placement in progress, Preferred dialysis unit is [facility]  -Transplant committee meeting 3/23 "Unable to determine transplant candidacy at this time due to need for 6 minute walk, PFT's, improved PA pressures on echo, reassess patient in clinic to BMI, and functional status/frailty. Another barrier is elevated PAP, can be relooked into after the dialysis is initiated". Patient at this time was deferring transplant.    HTN  Controlled better at home on Coreg and lasix 80 mg BID.    Anemia  -from ckd  -Hgb goal ~ 10  -Iron stores low  -Iron infusions scheduled April, patient understands risks and gave verbal consent    Secondary Hyperparathyroidism  -Phos high/Ca acceptable  -PTH trending up. Already on vitamin D, calcitriol was not covered by pharmacy? Ordered zemplar 3/27/23.          RTC in 2 months with labs      Thanks for allowing me to participate in the care of this patient.     3:32 PM                "

## 2023-03-27 NOTE — PATIENT INSTRUCTIONS
- Please get Iron infusions in April  - Please stop taking Procardia/Nifedipine 60 mg daily  - Please start taking Zemplar daily.  - Please see the vascular surgeon  - Please see me again in two months.

## 2023-03-29 LAB — HPRA INTERPRETATION: NORMAL

## 2023-03-30 ENCOUNTER — TELEPHONE (OUTPATIENT)
Dept: TRANSPLANT | Facility: CLINIC | Age: 63
End: 2023-03-30
Payer: MEDICARE

## 2023-03-30 NOTE — TELEPHONE ENCOUNTER
Spoke to the patient; scheduled PFT's, 6 min walk and labs (4/3 [facility]) pt confirmed appointment date time and location; The patient have no further questions or concerns at this time. reminder letter sent.

## 2023-04-03 ENCOUNTER — HOSPITAL ENCOUNTER (OUTPATIENT)
Dept: PULMONOLOGY | Facility: CLINIC | Age: 63
Discharge: HOME | End: 2023-04-03
Payer: MEDICARE

## 2023-04-03 VITALS — BODY MASS INDEX: 34.41 KG/M2 | HEIGHT: 68 IN | WEIGHT: 227.06 LBS

## 2023-04-03 DIAGNOSIS — Z76.82 ORGAN TRANSPLANT CANDIDATE: ICD-10-CM

## 2023-04-03 LAB
ALLENS TEST: ABNORMAL
DELSYS: ABNORMAL
HCO3 UR-SCNC: 20.3 MMOL/L (ref 24–28)
PCO2 BLDA: 41.9 MMHG (ref 35–45)
PH SMN: 7.29 [PH] (ref 7.35–7.45)
PO2 BLDA: 80 MMHG (ref 80–100)
POC BE: -6 MMOL/L
POC SATURATED O2: 94 % (ref 95–100)
POC TCO2: 22 MMOL/L (ref 23–27)
SAMPLE: ABNORMAL
SITE: ABNORMAL

## 2023-04-03 PROCEDURE — 94010 BREATHING CAPACITY TEST: CPT | Mod: S$GLB,TXP,, | Performed by: INTERNAL MEDICINE

## 2023-04-03 PROCEDURE — 82803 PR  BLOOD GASES: PH, PO2 & PCO2: ICD-10-PCS | Mod: S$GLB,TXP,, | Performed by: INTERNAL MEDICINE

## 2023-04-03 PROCEDURE — 94010 BREATHING CAPACITY TEST: ICD-10-PCS | Mod: S$GLB,TXP,, | Performed by: INTERNAL MEDICINE

## 2023-04-03 PROCEDURE — 82803 BLOOD GASES ANY COMBINATION: CPT | Mod: S$GLB,TXP,, | Performed by: INTERNAL MEDICINE

## 2023-04-03 PROCEDURE — 94729 PR C02/MEMBANE DIFFUSE CAPACITY: ICD-10-PCS | Mod: S$GLB,TXP,, | Performed by: INTERNAL MEDICINE

## 2023-04-03 PROCEDURE — 94729 DIFFUSING CAPACITY: CPT | Mod: S$GLB,TXP,, | Performed by: INTERNAL MEDICINE

## 2023-04-03 PROCEDURE — 94618 PULMONARY STRESS TESTING: ICD-10-PCS | Mod: S$GLB,TXP,, | Performed by: INTERNAL MEDICINE

## 2023-04-03 PROCEDURE — 94727 PR PULM FUNCTION TEST BY GAS: ICD-10-PCS | Mod: S$GLB,TXP,, | Performed by: INTERNAL MEDICINE

## 2023-04-03 PROCEDURE — 36600 WITHDRAWAL OF ARTERIAL BLOOD: CPT | Mod: S$GLB,TXP,, | Performed by: INTERNAL MEDICINE

## 2023-04-03 PROCEDURE — 94618 PULMONARY STRESS TESTING: CPT | Mod: S$GLB,TXP,, | Performed by: INTERNAL MEDICINE

## 2023-04-03 PROCEDURE — 36600 PR WITHDRAWAL OF ARTERIAL BLOOD: ICD-10-PCS | Mod: S$GLB,TXP,, | Performed by: INTERNAL MEDICINE

## 2023-04-03 PROCEDURE — 94727 GAS DIL/WSHOT DETER LNG VOL: CPT | Mod: S$GLB,TXP,, | Performed by: INTERNAL MEDICINE

## 2023-04-03 NOTE — PROCEDURES
Kathie Quezada is a 63 y.o.  female patient, who presents for a 6 minute walk test ordered by AMY Scherer.  The diagnosis is Pre-Operative Evaluation; Pulmonary Hypertension.  The patient's BMI is 34.5 kg/m2.  Predicted distance (lower limit of normal) is 307.09 meters.      Test Results:    The test was completed without stopping.  The total time walked was 360 seconds.  During walking, the patient reported:  No complaints.  The patient used no assistive devices during testing.     04/03/2023---------Distance: 284.07 meters (932 feet)     O2 Sat % Supplemental Oxygen Heart Rate Blood Pressure Ben Scale   Pre-exercise  (Resting) 96 % Room Air 75 bpm 132/63 mmHg 0   During Exercise 96 % Room Air 81 bpm 133/59 mmHg 3   Post-exercise  (Recovery) 98 % Room Air  79 bpm       Recovery Time: 100 seconds    Performing nurse/tech: ROSI Lew      PREVIOUS STUDY:   The patient has not had a previous study.      CLINICAL INTERPRETATION:  Six minute walk distance is 284.07 meters (932 feet) with moderate dyspnea.  During exercise, there was no desaturation while breathing room air.  Blood pressure and heart rate were stable during exercise.  The patient did not report non-pulmonary symptoms during exercise.  Significant exercise impairment is likely due to subjective symptoms.  The patient did complete the study, walking 360 seconds of the 360 second test.  No previous study performed.  Based upon age and body mass index, exercise capacity is less than predicted.

## 2023-04-06 ENCOUNTER — OFFICE VISIT (OUTPATIENT)
Dept: TRANSPLANT | Facility: CLINIC | Age: 63
End: 2023-04-06
Payer: MEDICARE

## 2023-04-06 VITALS
SYSTOLIC BLOOD PRESSURE: 110 MMHG | BODY MASS INDEX: 34.11 KG/M2 | WEIGHT: 225.06 LBS | RESPIRATION RATE: 16 BRPM | OXYGEN SATURATION: 95 % | HEIGHT: 68 IN | HEART RATE: 76 BPM | TEMPERATURE: 97 F | DIASTOLIC BLOOD PRESSURE: 87 MMHG

## 2023-04-06 DIAGNOSIS — Z76.82 ORGAN TRANSPLANT CANDIDATE: Primary | ICD-10-CM

## 2023-04-06 DIAGNOSIS — I15.0 RENOVASCULAR HYPERTENSION: Chronic | ICD-10-CM

## 2023-04-06 DIAGNOSIS — D68.1 FACTOR XI DEFICIENCY: Chronic | ICD-10-CM

## 2023-04-06 DIAGNOSIS — E11.3553 CONTROLLED TYPE 2 DIABETES MELLITUS WITH STABLE PROLIFERATIVE RETINOPATHY OF BOTH EYES, UNSPECIFIED WHETHER LONG TERM INSULIN USE: Chronic | ICD-10-CM

## 2023-04-06 DIAGNOSIS — N18.5 CHRONIC KIDNEY DISEASE (CKD), STAGE V: Chronic | ICD-10-CM

## 2023-04-06 PROCEDURE — 3074F SYST BP LT 130 MM HG: CPT | Mod: CPTII,S$GLB,TXP, | Performed by: NURSE PRACTITIONER

## 2023-04-06 PROCEDURE — 99215 PR OFFICE/OUTPT VISIT, EST, LEVL V, 40-54 MIN: ICD-10-PCS | Mod: S$GLB,TXP,, | Performed by: NURSE PRACTITIONER

## 2023-04-06 PROCEDURE — 1159F PR MEDICATION LIST DOCUMENTED IN MEDICAL RECORD: ICD-10-PCS | Mod: CPTII,S$GLB,TXP, | Performed by: NURSE PRACTITIONER

## 2023-04-06 PROCEDURE — 99999 PR PBB SHADOW E&M-EST. PATIENT-LVL IV: ICD-10-PCS | Mod: PBBFAC,TXP,, | Performed by: NURSE PRACTITIONER

## 2023-04-06 PROCEDURE — 99999 PR PBB SHADOW E&M-EST. PATIENT-LVL IV: CPT | Mod: PBBFAC,TXP,, | Performed by: NURSE PRACTITIONER

## 2023-04-06 PROCEDURE — 99215 OFFICE O/P EST HI 40 MIN: CPT | Mod: S$GLB,TXP,, | Performed by: NURSE PRACTITIONER

## 2023-04-06 PROCEDURE — 3066F PR DOCUMENTATION OF TREATMENT FOR NEPHROPATHY: ICD-10-PCS | Mod: CPTII,S$GLB,TXP, | Performed by: NURSE PRACTITIONER

## 2023-04-06 PROCEDURE — 3044F HG A1C LEVEL LT 7.0%: CPT | Mod: CPTII,S$GLB,TXP, | Performed by: NURSE PRACTITIONER

## 2023-04-06 PROCEDURE — 3066F NEPHROPATHY DOC TX: CPT | Mod: CPTII,S$GLB,TXP, | Performed by: NURSE PRACTITIONER

## 2023-04-06 PROCEDURE — 3079F DIAST BP 80-89 MM HG: CPT | Mod: CPTII,S$GLB,TXP, | Performed by: NURSE PRACTITIONER

## 2023-04-06 PROCEDURE — 1159F MED LIST DOCD IN RCRD: CPT | Mod: CPTII,S$GLB,TXP, | Performed by: NURSE PRACTITIONER

## 2023-04-06 PROCEDURE — 3079F PR MOST RECENT DIASTOLIC BLOOD PRESSURE 80-89 MM HG: ICD-10-PCS | Mod: CPTII,S$GLB,TXP, | Performed by: NURSE PRACTITIONER

## 2023-04-06 PROCEDURE — 3044F PR MOST RECENT HEMOGLOBIN A1C LEVEL <7.0%: ICD-10-PCS | Mod: CPTII,S$GLB,TXP, | Performed by: NURSE PRACTITIONER

## 2023-04-06 PROCEDURE — 3008F BODY MASS INDEX DOCD: CPT | Mod: CPTII,S$GLB,TXP, | Performed by: NURSE PRACTITIONER

## 2023-04-06 PROCEDURE — 3008F PR BODY MASS INDEX (BMI) DOCUMENTED: ICD-10-PCS | Mod: CPTII,S$GLB,TXP, | Performed by: NURSE PRACTITIONER

## 2023-04-06 PROCEDURE — 3074F PR MOST RECENT SYSTOLIC BLOOD PRESSURE < 130 MM HG: ICD-10-PCS | Mod: CPTII,S$GLB,TXP, | Performed by: NURSE PRACTITIONER

## 2023-04-06 NOTE — PROGRESS NOTES
Transplant Nephrology  Kidney Transplant Recipient Evaluation    Referring Physician: Cuba Kimball  Current Nephrologist: Cuba Kimball    Subjective:   CC:  Initial evaluation of kidney transplant candidacy.    HPI:  Ms. Quezada is a 63 y.o. year old Black or  female who has presented to be evaluated as a potential kidney transplant recipient.  She has advanced kidney disease secondary to presumed diabetic nephropathy and HTN.  Patient is currently pre-dialysis. She has a  none  for dialysis access.     Body mass index is 34.55 kg/m².    HTN, DM,CVA, Factor XI deficiency, treated HepC    Previously presented.     3/10/23 committee note  Unable to determine transplant candidacy at this time due to need for 6 minute walk, PFT's, improved PA pressures on echo, reassess patient in clinic to BMI, and functional status/frailty. Dr. Combs to discuss with Nephrologist regarding dialysis initiation. Get testing prior to clinic visit.       PFTs   4/3/23 Spirometry shows a reduced vital capacity suggesting restriction. Lung volumes show mildmoderate restriction is present. DLCO is severely decrease    6min walk today  CLINICAL INTERPRETATION:  Six minute walk distance is 284.07 meters (932 feet) with moderate dyspnea.  During exercise, there was no desaturation while breathing room air.  Blood pressure and heart rate were stable during exercise.  The patient did not report non-pulmonary symptoms during exercise.  Significant exercise impairment is likely due to subjective symptoms.  The patient did complete the study, walking 360 seconds of the 360 second test.  No previous study performed.  Based upon age and body mass index, exercise capacity is less than predicted.    Current Outpatient Medications:     carvediloL (COREG) 25 MG tablet, Take 1 tablet (25 mg total) by mouth 2 (two) times daily with meals., Disp: 180 tablet, Rfl: 3    cholecalciferol, vitamin D3, 125 mcg (5,000 unit) Tab,  Take 1 tablet (5,000 Units total) by mouth once daily., Disp: 90 tablet, Rfl: 3    EScitalopram oxalate (LEXAPRO) 10 MG tablet, Take 1 tablet (10 mg total) by mouth once daily., Disp: 90 tablet, Rfl: 3    ferrous gluconate (FERGON) 324 MG tablet, Take 1 tablet (324 mg total) by mouth daily with breakfast., Disp: 90 tablet, Rfl: 1    furosemide (LASIX) 40 MG tablet, Take 2 tablets (80 mg total) by mouth 2 (two) times a day., Disp: 120 tablet, Rfl: 3    levocetirizine (XYZAL) 5 MG tablet, Take 1 tablet (5 mg total) by mouth daily as needed for Allergies., Disp: 90 tablet, Rfl: 3    miconazole (MICOTIN) 2 % cream, Apply topically 2 (two) times daily to Bilateral feet, Disp: 57 g, Rfl: 2    SYNTHROID 200 mcg tablet, Take 1 tablet (200 mcg total) by mouth before breakfast., Disp: 90 tablet, Rfl: 3    traZODone (DESYREL) 50 MG tablet, Take 1 to 2 tablets ( mg total) by mouth nightly as needed for Insomnia., Disp: 120 tablet, Rfl: 3    montelukast (SINGULAIR) 5 MG chewable tablet, Chew and swallow 1 tablet (5 mg total) by mouth every evening. (Patient not taking: Reported on 4/6/2023), Disp: 30 tablet, Rfl: 0    oxyCODONE (ROXICODONE) 5 MG immediate release tablet, Take 1 tablet (5 mg total) by mouth every 4 (four) hours as needed for Pain., Disp: 15 tablet, Rfl: 0    paricalcitoL (ZEMPLAR) 1 MCG capsule, Take 1 capsule (1 mcg total) by mouth once daily. (Patient not taking: Reported on 4/6/2023), Disp: 30 capsule, Rfl: 2      Past Medical and Surgical History: Ms. Quezada  has a past medical history of Acute respiratory failure with hypoxia and hypercapnia, ATN (acute tubular necrosis), Chronic diastolic heart failure, CKD (chronic kidney disease) stage 4, GFR 15-29 ml/min, CKD stage G3a/A3, GFR 45-59 and albumin creatinine ratio >300 mg/g, CKD stage G3b/A3, GFR 30-44 and albumin creatinine ratio >300 mg/g, Closed compression fracture of L2 lumbar vertebra, Closed compression fracture of L2 lumbar vertebra,  Controlled type 2 diabetes with retinopathy, COVID-19 virus infection 7-1-2021, CVA (cerebral vascular accident), Diabetic polyneuropathy associated with type 2 diabetes mellitus, Encounter for blood transfusion, Essential hypertension, Factor XI deficiency, Gastroesophageal reflux disease without esophagitis, Generalized anxiety disorder, GERD (gastroesophageal reflux disease), GIB (gastrointestinal bleeding), History of CVA (cerebrovascular accident) without residual deficits, History of hepatitis C, Hypertensive retinopathy, bilateral, Mild nonproliferative diabetic retinopathy, Mixed hyperlipidemia, Myalgia due to statin, Normocytic anemia, NS (nuclear sclerosis), bilateral, PNA (pneumonia), Postoperative hypothyroidism, Renovascular hypertension, S/P kyphoplasty, Slow transit constipation, Type 2 diabetes mellitus with circulatory disorder, with long-term current use of insulin, Type 2 diabetes mellitus with stage 3 chronic kidney disease, without long-term current use of insulin, Type 2 diabetes mellitus with stage 3a chronic kidney disease, without long-term current use of insulin, and Type 2 diabetes mellitus with stage 3b chronic kidney disease, without long-term current use of insulin.  She has a past surgical history that includes Hysterectomy; Back surgery (2017); Tonsillectomy; Colonoscopy (11/13/2015); Oophorectomy (1996); Thyroidectomy (2016); Cholecystectomy (2015); Fixation Kyphoplasty (Bilateral, 08/14/2019); AV fistula placement (Left, 10/27/2022); Percutaneous transluminal angioplasty of arteriovenous fistula (Left, 12/16/2022); Fistulogram (N/A, 12/16/2022); Transposition of basilic vein (Left, 1/31/2023); and fistulogram, with pta (Left, 3/23/2023).    Past Social and Family History: Ms. Quezada reports that she has never smoked. She has never used smokeless tobacco. She reports that she does not currently use alcohol. She reports that she does not use drugs. Her family history includes  "Aneurysm in her brother; Coronary artery disease in her mother; Diabetes type II in her mother; Heart disease in her mother; Hypertension in her father and mother; Prostate cancer in her father; Stroke in her mother.    Review of Systems   Constitutional:  Negative for appetite change, chills, fatigue and fever.   HENT:  Negative for trouble swallowing.    Respiratory:  Negative for cough, chest tightness, shortness of breath and wheezing.    Cardiovascular:  Negative for chest pain, palpitations and leg swelling.   Gastrointestinal:  Negative for abdominal pain, constipation, diarrhea and nausea.   Genitourinary:  Negative for difficulty urinating, frequency and urgency.   Musculoskeletal:  Positive for arthralgias and back pain. Negative for myalgias.   Skin:  Negative for rash.   Neurological:  Negative for dizziness, weakness, light-headedness and headaches.   Hematological:  Bruises/bleeds easily.   Psychiatric/Behavioral:  Positive for dysphoric mood. Negative for sleep disturbance.      Objective:   Blood pressure 110/87, pulse 76, temperature 97.2 °F (36.2 °C), temperature source Skin, resp. rate 16, height 5' 7.68" (1.719 m), weight 102.1 kg (225 lb 1.4 oz), SpO2 95 %.body mass index is 34.55 kg/m².    Physical Exam  Constitutional:       General: She is not in acute distress.     Appearance: She is well-developed. She is obese. She is not diaphoretic.   Cardiovascular:      Rate and Rhythm: Normal rate and regular rhythm.      Heart sounds: Normal heart sounds. No murmur heard.    No friction rub. No gallop.   Pulmonary:      Effort: Pulmonary effort is normal. No respiratory distress.      Breath sounds: Normal breath sounds. No wheezing or rales.   Abdominal:      General: Bowel sounds are normal. There is no distension.      Palpations: Abdomen is soft.      Tenderness: There is no abdominal tenderness.   Musculoskeletal:         General: Swelling (3+ edema bilaterally) present. No tenderness. Normal " range of motion.   Skin:     General: Skin is warm and dry.      Findings: No rash.      Nails: There is no clubbing.   Neurological:      Mental Status: She is alert and oriented to person, place, and time.   Psychiatric:         Behavior: Behavior normal.       Labs:  Lab Results   Component Value Date    WBC 10.77 03/27/2023    HGB 10.2 (L) 03/27/2023    HCT 33.6 (L) 03/27/2023     03/27/2023    K 4.3 03/27/2023     03/27/2023    CO2 25 03/27/2023    BUN 47 (H) 03/27/2023    CREATININE 4.0 (H) 03/27/2023    EGFRNONAA 13.0 (A) 07/30/2022    CALCIUM 9.5 03/27/2023    PHOS 5.3 (H) 03/27/2023    MG 1.8 02/04/2023    ALBUMIN 3.6 03/27/2023    ALBUMIN 3.6 03/27/2023    AST 16 03/27/2023    ALT 11 03/27/2023    UTPCR 0.60 (H) 03/27/2023    .0 (H) 03/27/2023       Lab Results   Component Value Date    BILIRUBINUA Negative 02/01/2023    LIPASE 46 07/30/2022    PROTEINUA 2+ (A) 02/01/2023    NITRITE Negative 02/01/2023    RBCUA 1 02/01/2023    WBCUA 22 (H) 02/01/2023       No results found for: HLAABCTYPE    Labs were reviewed with the patient.    Assessment:     1. Organ transplant candidate    2. Renovascular hypertension    3. Chronic kidney disease (CKD), stage V    4. Factor XI deficiency    5. Controlled type 2 diabetes mellitus with stable proliferative retinopathy of both eyes, unspecified whether long term insulin use          Plan:   Ms. Quezada is a 62 y.o. year old Black or  female who has presented to be evaluated as a potential kidney transplant recipient. Patient is currently pre-dialysis. PMHx of HTN, DM, CVA, Factor Xi deficiency, Cured HCV    6min walk and PFT  Functional status still limited  Still needs improvement in echo    Needs to be discussed in committee has not started dialysis yet.     Transplant Candidacy:   Based on available information, Ms. Quezada is a high-risk kidney transplant candidate.   Meets center eligibility for accepting HCV+ donor offer -  yes.  Patient educated on HCV+ donors. Kathie is willing to accept HCV+ donor offer - yes   Patient is a candidate for KDPI > 85 kidney donor offer - no. Due to weight  Final determination of transplant candidacy will be made once workup is complete and reviewed by the selection committee.    Patient advised that it is recommended that all transplant candidates, and their close contacts and household members receive Covid vaccination.    Maria Del Carmen Holland, AMY       [employer] Patient Status  Functional Status: 80% - Normal activity with effort: some symptoms of disease  Physical Capacity: Limited Mobility

## 2023-04-06 NOTE — LETTER
April 10, 2023        Cuba Kimball  [address]  [facility] [address]  Phone: [phone]  Fax: [phone]             [facility]  [address]  [facility] [address]  Phone: [phone]   Patient: Kathie Quezada   MR Number: [id number]   YOB: 1960   Date of Visit: 4/6/2023       Dear Dr. Cuba Kimball    Thank you for referring Kathie Quezada to me for evaluation. Attached you will find relevant portions of my assessment and plan of care.    If you have questions, please do not hesitate to call me. I look forward to following Kathie Quezada along with you.    Sincerely,    Maria Del Carmen Holland, NP    Enclosure    If you would like to receive this communication electronically, please contact [email or web address] or [phone] to request [employer] access.    [employer] is a tool which provides read-only access to select patient information with whom you have a relationship. Its easy to use and provides real time access to review your patients record including encounter summaries, notes, results, and demographic information.    If you feel you have received this communication in error or would no longer like to receive these types of communications, please e-mail [email or web address]

## 2023-04-10 ENCOUNTER — TELEPHONE (OUTPATIENT)
Dept: ENDOSCOPY | Facility: HOSPITAL | Age: 63
End: 2023-04-10
Payer: MEDICARE

## 2023-04-10 NOTE — TELEPHONE ENCOUNTER
----- Message from Taisha Hyman MA sent at 4/10/2023  8:38 AM CDT -----  Contact: [phone]  Pt is calling to cancel procedure scheduled on 04/18/23. Please reach out to pt at [phone].

## 2023-04-14 ENCOUNTER — INFUSION (OUTPATIENT)
Dept: INFECTIOUS DISEASES | Facility: HOSPITAL | Age: 63
End: 2023-04-14
Attending: INTERNAL MEDICINE
Payer: MEDICARE

## 2023-04-14 VITALS
DIASTOLIC BLOOD PRESSURE: 67 MMHG | HEART RATE: 69 BPM | TEMPERATURE: 98 F | BODY MASS INDEX: 34.79 KG/M2 | SYSTOLIC BLOOD PRESSURE: 156 MMHG | OXYGEN SATURATION: 96 % | WEIGHT: 226.63 LBS

## 2023-04-14 DIAGNOSIS — D50.9 IRON DEFICIENCY ANEMIA, UNSPECIFIED IRON DEFICIENCY ANEMIA TYPE: Primary | ICD-10-CM

## 2023-04-14 DIAGNOSIS — N18.5 CHRONIC KIDNEY DISEASE (CKD), STAGE V: ICD-10-CM

## 2023-04-14 PROCEDURE — 96365 THER/PROPH/DIAG IV INF INIT: CPT | Mod: HCNC,NTX

## 2023-04-14 PROCEDURE — 25000003 PHARM REV CODE 250: Mod: HCNC,TXP | Performed by: INTERNAL MEDICINE

## 2023-04-14 PROCEDURE — 63600175 PHARM REV CODE 636 W HCPCS: Mod: JZ,JG,HCNC,NTX | Performed by: INTERNAL MEDICINE

## 2023-04-14 PROCEDURE — 25000003 PHARM REV CODE 250: Mod: HCNC,NTX | Performed by: INTERNAL MEDICINE

## 2023-04-14 PROCEDURE — 96367 TX/PROPH/DG ADDL SEQ IV INF: CPT | Mod: HCNC,TXP

## 2023-04-14 RX ORDER — DIPHENHYDRAMINE HYDROCHLORIDE 50 MG/ML
50 INJECTION INTRAMUSCULAR; INTRAVENOUS ONCE AS NEEDED
Status: DISCONTINUED | OUTPATIENT
Start: 2023-04-14 | End: 2023-04-14 | Stop reason: HOSPADM

## 2023-04-14 RX ORDER — EPINEPHRINE 0.3 MG/.3ML
0.3 INJECTION SUBCUTANEOUS ONCE AS NEEDED
Status: CANCELLED | OUTPATIENT
Start: 2023-04-14

## 2023-04-14 RX ORDER — DIPHENHYDRAMINE HYDROCHLORIDE 50 MG/ML
50 INJECTION INTRAMUSCULAR; INTRAVENOUS ONCE AS NEEDED
Status: CANCELLED | OUTPATIENT
Start: 2023-04-14

## 2023-04-14 RX ORDER — SODIUM CHLORIDE 0.9 % (FLUSH) 0.9 %
10 SYRINGE (ML) INJECTION
Status: DISCONTINUED | OUTPATIENT
Start: 2023-04-14 | End: 2023-04-14 | Stop reason: HOSPADM

## 2023-04-14 RX ORDER — METHYLPREDNISOLONE SOD SUCC 125 MG
125 VIAL (EA) INJECTION ONCE AS NEEDED
Status: DISCONTINUED | OUTPATIENT
Start: 2023-04-14 | End: 2023-04-14 | Stop reason: HOSPADM

## 2023-04-14 RX ORDER — EPINEPHRINE 0.3 MG/.3ML
0.3 INJECTION SUBCUTANEOUS ONCE AS NEEDED
Status: DISCONTINUED | OUTPATIENT
Start: 2023-04-14 | End: 2023-04-14 | Stop reason: HOSPADM

## 2023-04-14 RX ORDER — SODIUM CHLORIDE 0.9 % (FLUSH) 0.9 %
10 SYRINGE (ML) INJECTION
Status: CANCELLED | OUTPATIENT
Start: 2023-04-14

## 2023-04-14 RX ORDER — METHYLPREDNISOLONE SOD SUCC 125 MG
125 VIAL (EA) INJECTION ONCE AS NEEDED
Status: CANCELLED | OUTPATIENT
Start: 2023-04-14

## 2023-04-14 RX ADMIN — SODIUM CHLORIDE: 9 INJECTION, SOLUTION INTRAVENOUS at 12:04

## 2023-04-14 RX ADMIN — FERUMOXYTOL 510 MG: 510 INJECTION INTRAVENOUS at 12:04

## 2023-04-14 NOTE — PROGRESS NOTES
Pt arrived to infusion suite for Feraheme 1/2 infusion over 30 mins., NS @25 cc/hr infused concurrently as flush bag, tolerated well.  Observed x 1 hr., no c/o voiced.  Left unit in NAd.

## 2023-04-17 LAB
CLASS I ANTIBODIES - LUMINEX: NORMAL
CLASS II ANTIBODY COMMENTS - LUMINEX: NORMAL
CLASS II ANTIBODY COMMENTS - LUMINEX: NORMAL
CPRA %: 25
CPRA %: 25
SERUM COLLECTION DT - LUMINEX CLASS I: NORMAL
SERUM COLLECTION DT - LUMINEX CLASS II: NORMAL
SERUM COLLECTION DT - LUMINEX CLASS II: NORMAL
SLAA2 TESTING DATE: NORMAL
SPCL1 TESTING DATE: NORMAL
SPCL2 TESTING DATE: NORMAL
SPCLU TESTING DATE: NORMAL

## 2023-04-21 ENCOUNTER — INFUSION (OUTPATIENT)
Dept: INFECTIOUS DISEASES | Facility: HOSPITAL | Age: 63
End: 2023-04-21
Payer: MEDICARE

## 2023-04-21 VITALS
DIASTOLIC BLOOD PRESSURE: 65 MMHG | OXYGEN SATURATION: 98 % | HEART RATE: 73 BPM | SYSTOLIC BLOOD PRESSURE: 138 MMHG | TEMPERATURE: 99 F

## 2023-04-21 DIAGNOSIS — D50.9 IRON DEFICIENCY ANEMIA, UNSPECIFIED IRON DEFICIENCY ANEMIA TYPE: Primary | ICD-10-CM

## 2023-04-21 DIAGNOSIS — N18.5 CHRONIC KIDNEY DISEASE (CKD), STAGE V: ICD-10-CM

## 2023-04-21 PROCEDURE — 63600175 PHARM REV CODE 636 W HCPCS: Mod: JZ,JG,HCNC,TXP | Performed by: INTERNAL MEDICINE

## 2023-04-21 PROCEDURE — 25000003 PHARM REV CODE 250: Mod: HCNC,TXP | Performed by: INTERNAL MEDICINE

## 2023-04-21 PROCEDURE — 96365 THER/PROPH/DIAG IV INF INIT: CPT | Mod: HCNC,TXP

## 2023-04-21 RX ORDER — SODIUM CHLORIDE 0.9 % (FLUSH) 0.9 %
10 SYRINGE (ML) INJECTION
Status: DISCONTINUED | OUTPATIENT
Start: 2023-04-21 | End: 2023-04-21 | Stop reason: HOSPADM

## 2023-04-21 RX ORDER — DIPHENHYDRAMINE HYDROCHLORIDE 50 MG/ML
50 INJECTION INTRAMUSCULAR; INTRAVENOUS ONCE AS NEEDED
OUTPATIENT
Start: 2023-04-21

## 2023-04-21 RX ORDER — DIPHENHYDRAMINE HYDROCHLORIDE 50 MG/ML
50 INJECTION INTRAMUSCULAR; INTRAVENOUS ONCE AS NEEDED
Status: DISCONTINUED | OUTPATIENT
Start: 2023-04-21 | End: 2023-04-21 | Stop reason: HOSPADM

## 2023-04-21 RX ORDER — EPINEPHRINE 0.3 MG/.3ML
0.3 INJECTION SUBCUTANEOUS ONCE AS NEEDED
Status: DISCONTINUED | OUTPATIENT
Start: 2023-04-21 | End: 2023-04-21 | Stop reason: HOSPADM

## 2023-04-21 RX ORDER — EPINEPHRINE 0.3 MG/.3ML
0.3 INJECTION SUBCUTANEOUS ONCE AS NEEDED
OUTPATIENT
Start: 2023-04-21

## 2023-04-21 RX ORDER — METHYLPREDNISOLONE SOD SUCC 125 MG
125 VIAL (EA) INJECTION ONCE AS NEEDED
Status: DISCONTINUED | OUTPATIENT
Start: 2023-04-21 | End: 2023-04-21 | Stop reason: HOSPADM

## 2023-04-21 RX ORDER — METHYLPREDNISOLONE SOD SUCC 125 MG
125 VIAL (EA) INJECTION ONCE AS NEEDED
OUTPATIENT
Start: 2023-04-21

## 2023-04-21 RX ORDER — SODIUM CHLORIDE 0.9 % (FLUSH) 0.9 %
10 SYRINGE (ML) INJECTION
Status: CANCELLED | OUTPATIENT
Start: 2023-04-21

## 2023-04-21 RX ADMIN — FERUMOXYTOL 510 MG: 510 INJECTION INTRAVENOUS at 11:04

## 2023-04-21 RX ADMIN — SODIUM CHLORIDE: 9 INJECTION, SOLUTION INTRAVENOUS at 11:04

## 2023-04-21 NOTE — PROGRESS NOTES
Pt arrived to infusion suite for Feraheme 2/2 infusion over 30 mins., NS @25 cc/hr infused concurrently as flush bag, tolerated well.  Observed x 30 minutes., no c/o voiced.  Left unit in NAd.

## 2023-04-27 DIAGNOSIS — T82.898D ARTERIOVENOUS FISTULA OCCLUSION, SUBSEQUENT ENCOUNTER: Primary | ICD-10-CM

## 2023-04-28 ENCOUNTER — TELEPHONE (OUTPATIENT)
Dept: TRANSPLANT | Facility: CLINIC | Age: 63
End: 2023-04-28
Payer: MEDICARE

## 2023-04-28 NOTE — TELEPHONE ENCOUNTER
MA notes per Dr Fred Kelly (nephrologist)    FOR THE PAST THREE MONTHS:    0-Appt Adhrence  0-No show    No concerns with labs, care giving, transportation, or mental health    Per Dr Fred Kelly (nephrologist) pt is very compliant. No concerns.     Kenna Tillman  Abdominal Transplant MA

## 2023-05-03 DIAGNOSIS — T82.898D ARTERIOVENOUS FISTULA OCCLUSION, SUBSEQUENT ENCOUNTER: Primary | ICD-10-CM

## 2023-05-04 ENCOUNTER — HOSPITAL ENCOUNTER (OUTPATIENT)
Dept: VASCULAR SURGERY | Facility: CLINIC | Age: 63
Discharge: HOME | End: 2023-05-04
Attending: SURGERY
Payer: MEDICARE

## 2023-05-04 DIAGNOSIS — N18.6 ESRD (END STAGE RENAL DISEASE): Primary | ICD-10-CM

## 2023-05-04 DIAGNOSIS — T82.898D ARTERIOVENOUS FISTULA OCCLUSION, SUBSEQUENT ENCOUNTER: ICD-10-CM

## 2023-05-04 PROCEDURE — 93990 DOPPLER FLOW TESTING: CPT | Mod: S$GLB,,, | Performed by: SURGERY

## 2023-05-04 PROCEDURE — 93990 PR DUPLEX HEMODIALYSIS ACCESS: ICD-10-PCS | Mod: S$GLB,,, | Performed by: SURGERY

## 2023-05-05 ENCOUNTER — OFFICE VISIT (OUTPATIENT)
Dept: VASCULAR SURGERY | Facility: CLINIC | Age: 63
End: 2023-05-05
Payer: MEDICARE

## 2023-05-05 ENCOUNTER — COMMITTEE REVIEW (OUTPATIENT)
Dept: TRANSPLANT | Facility: CLINIC | Age: 63
End: 2023-05-05
Payer: MEDICARE

## 2023-05-05 ENCOUNTER — TELEPHONE (OUTPATIENT)
Dept: TRANSPLANT | Facility: CLINIC | Age: 63
End: 2023-05-05
Payer: MEDICARE

## 2023-05-05 VITALS
DIASTOLIC BLOOD PRESSURE: 58 MMHG | WEIGHT: 224.88 LBS | HEIGHT: 69 IN | HEART RATE: 75 BPM | TEMPERATURE: 98 F | SYSTOLIC BLOOD PRESSURE: 123 MMHG | BODY MASS INDEX: 33.31 KG/M2

## 2023-05-05 DIAGNOSIS — N18.6 ESRD (END STAGE RENAL DISEASE): Primary | ICD-10-CM

## 2023-05-05 PROCEDURE — 3078F PR MOST RECENT DIASTOLIC BLOOD PRESSURE < 80 MM HG: ICD-10-PCS | Mod: CPTII,S$GLB,, | Performed by: SURGERY

## 2023-05-05 PROCEDURE — 99999 PR PBB SHADOW E&M-EST. PATIENT-LVL III: CPT | Mod: PBBFAC,TXP,, | Performed by: SURGERY

## 2023-05-05 PROCEDURE — 3008F BODY MASS INDEX DOCD: CPT | Mod: CPTII,S$GLB,, | Performed by: SURGERY

## 2023-05-05 PROCEDURE — 3078F DIAST BP <80 MM HG: CPT | Mod: CPTII,S$GLB,, | Performed by: SURGERY

## 2023-05-05 PROCEDURE — 3066F NEPHROPATHY DOC TX: CPT | Mod: CPTII,S$GLB,, | Performed by: SURGERY

## 2023-05-05 PROCEDURE — 3066F PR DOCUMENTATION OF TREATMENT FOR NEPHROPATHY: ICD-10-PCS | Mod: CPTII,S$GLB,, | Performed by: SURGERY

## 2023-05-05 PROCEDURE — 3074F PR MOST RECENT SYSTOLIC BLOOD PRESSURE < 130 MM HG: ICD-10-PCS | Mod: CPTII,S$GLB,, | Performed by: SURGERY

## 2023-05-05 PROCEDURE — 3008F PR BODY MASS INDEX (BMI) DOCUMENTED: ICD-10-PCS | Mod: CPTII,S$GLB,, | Performed by: SURGERY

## 2023-05-05 PROCEDURE — 3044F PR MOST RECENT HEMOGLOBIN A1C LEVEL <7.0%: ICD-10-PCS | Mod: CPTII,S$GLB,, | Performed by: SURGERY

## 2023-05-05 PROCEDURE — 3074F SYST BP LT 130 MM HG: CPT | Mod: CPTII,S$GLB,, | Performed by: SURGERY

## 2023-05-05 PROCEDURE — 3044F HG A1C LEVEL LT 7.0%: CPT | Mod: CPTII,S$GLB,, | Performed by: SURGERY

## 2023-05-05 PROCEDURE — 99214 PR OFFICE/OUTPT VISIT, EST, LEVL IV, 30-39 MIN: ICD-10-PCS | Mod: S$GLB,,, | Performed by: SURGERY

## 2023-05-05 PROCEDURE — 99999 PR PBB SHADOW E&M-EST. PATIENT-LVL III: ICD-10-PCS | Mod: PBBFAC,TXP,, | Performed by: SURGERY

## 2023-05-05 PROCEDURE — 99214 OFFICE O/P EST MOD 30 MIN: CPT | Mod: S$GLB,,, | Performed by: SURGERY

## 2023-05-05 NOTE — TELEPHONE ENCOUNTER
----- Message from Luis Miguel Alonso MD sent at 5/1/2023 12:25 PM CDT -----  Looking at the data. I see she predialysis obese with a 2D echo showing significant pericardial effusion, +++ edema lower extremity from our NP Maria Del Carmen Holland's note 2 months ago and PA pressure of  58 mm Hg. I will defer dialysis initiation to Dr Temple but the above data indicates consideration for more fluid removal to improve cardiac and allograft outcomes before we can perform transplant surgery or  put her active in the waiting list. Will temporarily close the file until above issues are addressed. Please call me to discuss the case. I am looking forward to your words of wisdom. '    Best regards,    Luis Miguel   [phone]  ----- Message -----  From: Lynne Kirkpatrick RN  Sent: 4/27/2023   1:57 PM CDT  To: Luis Miguel Alonso MD, Delilah Scherer, NP    Dr. Alonso,   Can you review this patient and let me know what you think of her candidacy?   She was UTD 3/10/23   need for 6 minute walk, PFT's, improved PA pressures on echo, reassess patient in clinic to BMI, and functional status/frailty. Dr. Combs to discuss with Nephrologist regarding dialysis initiation. Follow up Neph 6/5/23.    See up to date clinic visit, 6 min walk, PFT and Neph note. She still pending a colonoscopy/EGD per GI recommendations and has not started dialysis yet. I was wondering if she's a candidate before I continue to hold on for the rest of her work-up.     Lynne

## 2023-05-05 NOTE — LETTER
May 5, 2023    Kathie Hamptonblanc  [address] [address]    Dear Kathie Quezada:  MRN: [id number]    It is the duty of the [facility] Selection Committee to determine which patients are candidates for a transplant. For this reason, our committee has the difficult task of evaluating patients to determine which ones have the greatest chance of having a successful transplant. We are aware of the magnitude of this responsibility, and we approach it with reverence and humility.    It is with regret I inform you that you are not approved as a transplant candidate due to  pulmonary hypertension and pericardial effusion noted on echocardiogram.  Based on this review, we have determined that at this time, you are not a candidate for a transplant at [facility].  You may be re-referred once issues have been resolved and dialysis has been initiated.     The selection committee carefully considers each patient's transplant candidacy and determines whether it is safe to proceed with transplantation on a case-by-case basis using established selection criteria.  At present, the risk of proceeding with an elective transplant surgery has become too high.                                                                               Although the selection committee believes you are not a suitable transplant candidate, you have the option to be evaluated at other transplant centers who may have different selection criteria.  You may request your [facility] records be sent to any center of your choice by contacting our Medical Records Department at [phone].                                                                               Attached is a letter from the [employer] for Organ [employer]aring (UNOS).  It describes the services and information offered to patients by UNOS and the Organ Procurement and Transplant Network.    The [facility] sincerely wishes you the best and remains  available to answer any questions.  Please do not hesitate to contact our pre-transplant office if we can assist you in any other way.                                                                               Sincerely,      Lora Morin MD  Medical Director, Kidney & Kidney/Pancreas Transplantation  jr  Cc: Dr. Fred Kelly        The Organ Procurement and Transplantation Network   Toll-free patient services line: Your resource for organ transplant information     If you have a question regarding your own medical care, you always should call your transplant hospital first. However, for general organ transplant-related information, you can call the Organ Procurement and Transplantation Network (OPTN) toll-free patient services line at [phone].     Anyone, including potential transplant candidates, candidates, recipients, family members, friends, living donors, and donor family members, can call this number to:     Talk about organ donation, living donation, the transplant process, the donation process, and transplant policies.   Get a free patient information kit with helpful booklets, waiting list and transplant information, and a list of all transplant hospitals.   Ask questions about the OPTN website (https://optn.transplant.hrsa.gov/), the [employer] for Organ Sharings (UNOS) website (https://unos.org/), or the UNOS website for living donors and transplant recipients. (https://www.transplantliving.org/).   Learn how the OPTN can help you.   Talk about any concerns that you may have with a transplant hospital.     The nations transplant system, the OPTN, is managed under federal contract by the [employer] Organ [employer]aring (UNOS), which is a non-profit charitable organization. The OPTN helps create and define organ sharing policies that make the best use of donated organs. This process continuously evaluating new advances and discoveries so policies can be adapted to best serve  patients waiting for transplants. To do so, the OPTN works closely with transplant professionals, transplant patients, transplant candidates, donor families, living donors, and the public. All transplant programs and organ procurement organizations throughout the country are OPTN members and are obligated to follow the policies the OPTN creates for allocating organs.     The OPTN also is responsible for:   Providing educational material for patients, the public, and professionals.   Raising awareness of the need for donated organs and tissue.   Coordinating organ procurement, matching, and placement.   Collecting information about every organ transplant and donation that occurs in the [place].     Remember, you should contact your transplant hospital directly if you have questions or concerns about your own medical care including medical records, work-up progress, and test results.     We are not your transplant hospital, and our staff will not be able to answer questions about your case, so please keep your transplant hospitals phone number handy.   However, while you research your transplant needs and learn as much as you can about transplantation and donation, we welcome your call to our toll-free patient services line at [phone].

## 2023-05-05 NOTE — PROGRESS NOTES
Kathie Quezada  05/05/2023    HPI:  Patient is a 63 y.o. female with a h/o DMII with retinopathy, HTN, CVA, Factor 11 deficiency, and multiple AKIS the last was from urinary retention who is here today for f/u.    She is R handed with an obese upper arm.  Pt is unsure of timeframe for needing dialysis.  Last GFR 13.9, Cr 3.6.  Hypertensive today to 180s SBP.     S/p   3/23/23  L TRA, L BVT AVF - PTA proximal diffuse stenoses with 6x80mm DCB (Lutonix) balloon    Findings/Key Components:  Successful treatment of proximal stenosis  Good palpable thrill    1/31/23  Transposed L BVT,. 2nd stage  Findings: 2nd stage L BVT AVF done; dilated to 5-6 mm with strong thrill throughout course.     12/16/22  L TRA, PTA proximal (diffuse) stenoses x3 with 6x60mm Sterlin balloon     10/27/22  Creation of 1st stage L BVT AVF     Findings/Key Components:  L basilic vein dilated to 3-4mm; segment of 8-10cm was mobilized at a/c fossa  Strong thrill  Triphasic L ulnar doppler signal with minimal augmentation with AVF compression  Obese upper arm (BMI 38)  Consider transposition in 3-6months+     No hx of MI  Hx of CHF, EF 65%%, enlarged RA   Stroke ~3 years ago, no residual deficits   Tobacco use: never     Current Outpatient Medications:     carvediloL (COREG) 25 MG tablet, Take 1 tablet (25 mg total) by mouth 2 (two) times daily with meals., Disp: 180 tablet, Rfl: 3    cholecalciferol, vitamin D3, 125 mcg (5,000 unit) Tab, Take 1 tablet (5,000 Units total) by mouth once daily., Disp: 90 tablet, Rfl: 3    EScitalopram oxalate (LEXAPRO) 10 MG tablet, Take 1 tablet (10 mg total) by mouth once daily., Disp: 90 tablet, Rfl: 3    ferrous gluconate (FERGON) 324 MG tablet, Take 1 tablet (324 mg total) by mouth daily with breakfast., Disp: 90 tablet, Rfl: 1    furosemide (LASIX) 40 MG tablet, Take 2 tablets (80 mg total) by mouth 2 (two) times a day., Disp: 120 tablet, Rfl: 3    levocetirizine (XYZAL) 5 MG tablet, Take 1 tablet (5 mg  total) by mouth daily as needed for Allergies., Disp: 90 tablet, Rfl: 3    miconazole (MICOTIN) 2 % cream, Apply topically 2 (two) times daily to Bilateral feet, Disp: 57 g, Rfl: 2    SYNTHROID 200 mcg tablet, Take 1 tablet (200 mcg total) by mouth before breakfast., Disp: 90 tablet, Rfl: 3    traZODone (DESYREL) 50 MG tablet, Take 1 to 2 tablets ( mg total) by mouth nightly as needed for Insomnia., Disp: 120 tablet, Rfl: 3    PHYSICAL EXAM:   Right Arm BP - Sittin/58 (23 0857)  Pulse: 75  Temp: 98.1 °F (36.7 °C)                 Neck: Supple, no significant adenopathy; thyroid is not enlarged          Abdomen: Soft, nontender, nondistended, no masses or organomegaly     No rebound tenderness noted; bowel sounds normal     No groin adenopathy      Extremities:   3 cm scar overlying L antecubital fossa      Soft 2+ L radial pulse, 2+ L brachial pulse      Significant obesity upper arm      Proximal L BVT AVF pulsatility, then fair thrill    LAB RESULTS:  Lab Results   Component Value Date    K 4.3 2023    K 3.7 02/10/2023    K 4.3 2023    CREATININE 4.0 (H) 2023    CREATININE 4.0 (H) 02/10/2023    CREATININE 3.9 (H) 2023     Lab Results   Component Value Date    WBC 10.77 2023    WBC 11.40 02/10/2023    WBC 10.80 2023    HCT 33.6 (L) 2023    HCT 28.9 (L) 02/10/2023    HCT 28.7 (L) 2023     2023     02/10/2023     2023     Lab Results   Component Value Date    HGBA1C 5.2 2023    HGBA1C 5.5 2022    HGBA1C 6.8 (H) 2022     IMAGIN2023   U/S: flow vol 1090 ml/min   cm/s proximally and 339 cm/ds in outflow  Diam 7-8mm  Depth 7.9 - 14mm    Prior:  AVF u/s: flow vol 1,326 ml/min  PSVs 688 and 597 cm/s proximally -- 'long segment narrowing beyond anastomosis'    AVF u/s: flow vol 1127 ml/min  PSVs 706 cm/s proximally -- 'long segment narrowing beyond anastomosis'    IMP/PLAN:     63 y.o. female  with a h/o DMII with retinopathy, HTN, stroke and obesity (BMI 40), and multiple AKIs the last was from urinary retention -- s/p L BVT AVF (2-stages) and prior PTA diffuse proximal stenosis -- recurrent proximal single lesion  Obese L upper arm  S/p recent L TRA PTA w 6x80mm DCB for diffuse proximal stenosis - has very strong thrill    Though PSV proximally remains, may be tortuosity; does have PSV elevation in outflow, which is new. With very strong thrill and not yet on HD, will manage expectantly for now  Needs weight loss, L hand exercises  Follow-up with me in 1 year for PE and u/s surveillance; sooner should issues arise       Tirso Tavera MD DFSVS FACS   Vascular/Endovascular Surgery

## 2023-05-05 NOTE — COMMITTEE REVIEW
Native Organ Dx: Diabetes Mellitus - Type II      Patient not a candidate at this time due to pulmonary hypertension and pericardial effusion noted on echocardiogram. Can be re-referred once issues have been resolved. It is noted she is predialysis, and team discussed dialysis may benefit fluid management.    Patient informed of committee's decision. All questions were answered and patient voiced understanding.       Note written by Lynne Kirkpatrick RN    ===============================================        I was present at the meeting and attest to the decision of the committee.

## 2023-05-08 PROBLEM — J96.01 ACUTE HYPOXEMIC RESPIRATORY FAILURE: Status: RESOLVED | Noted: 2023-02-01 | Resolved: 2023-05-08

## 2023-05-16 ENCOUNTER — LAB VISIT (OUTPATIENT)
Dept: LAB | Facility: HOSPITAL | Age: 63
End: 2023-05-16
Attending: INTERNAL MEDICINE
Payer: MEDICARE

## 2023-05-16 DIAGNOSIS — D50.9 IRON DEFICIENCY ANEMIA, UNSPECIFIED IRON DEFICIENCY ANEMIA TYPE: ICD-10-CM

## 2023-05-16 LAB
FERRITIN SERPL-MCNC: 556 NG/ML (ref 20–300)
HGB BLD-MCNC: 10.6 G/DL (ref 12–16)
IRON SERPL-MCNC: 77 UG/DL (ref 30–160)
SATURATED IRON: 29 % (ref 20–50)
TOTAL IRON BINDING CAPACITY: 268 UG/DL (ref 250–450)
TRANSFERRIN SERPL-MCNC: 181 MG/DL (ref 200–375)

## 2023-05-16 PROCEDURE — 36415 COLL VENOUS BLD VENIPUNCTURE: CPT | Performed by: INTERNAL MEDICINE

## 2023-05-16 PROCEDURE — 85018 HEMOGLOBIN: CPT | Performed by: INTERNAL MEDICINE

## 2023-05-16 PROCEDURE — 82728 ASSAY OF FERRITIN: CPT | Performed by: INTERNAL MEDICINE

## 2023-05-16 PROCEDURE — 84466 ASSAY OF TRANSFERRIN: CPT | Performed by: INTERNAL MEDICINE

## 2023-05-17 ENCOUNTER — PATIENT MESSAGE (OUTPATIENT)
Dept: OPHTHALMOLOGY | Facility: CLINIC | Age: 63
End: 2023-05-17
Payer: MEDICARE

## 2023-05-26 ENCOUNTER — OFFICE VISIT (OUTPATIENT)
Dept: OPHTHALMOLOGY | Facility: CLINIC | Age: 63
End: 2023-05-26
Payer: MEDICARE

## 2023-05-26 DIAGNOSIS — E11.3553 CONTROLLED TYPE 2 DIABETES MELLITUS WITH STABLE PROLIFERATIVE RETINOPATHY OF BOTH EYES, UNSPECIFIED WHETHER LONG TERM INSULIN USE: Primary | Chronic | ICD-10-CM

## 2023-05-26 PROCEDURE — 3044F PR MOST RECENT HEMOGLOBIN A1C LEVEL <7.0%: ICD-10-PCS | Mod: CPTII,S$GLB,, | Performed by: OPHTHALMOLOGY

## 2023-05-26 PROCEDURE — 92014 COMPRE OPH EXAM EST PT 1/>: CPT | Mod: S$GLB,,, | Performed by: OPHTHALMOLOGY

## 2023-05-26 PROCEDURE — 3066F PR DOCUMENTATION OF TREATMENT FOR NEPHROPATHY: ICD-10-PCS | Mod: CPTII,S$GLB,, | Performed by: OPHTHALMOLOGY

## 2023-05-26 PROCEDURE — 1159F MED LIST DOCD IN RCRD: CPT | Mod: CPTII,S$GLB,, | Performed by: OPHTHALMOLOGY

## 2023-05-26 PROCEDURE — 92201 OPSCPY EXTND RTA DRAW UNI/BI: CPT | Mod: S$GLB,,, | Performed by: OPHTHALMOLOGY

## 2023-05-26 PROCEDURE — 1160F PR REVIEW ALL MEDS BY PRESCRIBER/CLIN PHARMACIST DOCUMENTED: ICD-10-PCS | Mod: CPTII,S$GLB,, | Performed by: OPHTHALMOLOGY

## 2023-05-26 PROCEDURE — 99999 PR PBB SHADOW E&M-EST. PATIENT-LVL III: ICD-10-PCS | Mod: PBBFAC,,, | Performed by: OPHTHALMOLOGY

## 2023-05-26 PROCEDURE — 92201 PR OPHTHALMOSCOPY, EXT, W/RET DRAW/SCLERAL DEPR, I&R, UNI/BI: ICD-10-PCS | Mod: S$GLB,,, | Performed by: OPHTHALMOLOGY

## 2023-05-26 PROCEDURE — 3066F NEPHROPATHY DOC TX: CPT | Mod: CPTII,S$GLB,, | Performed by: OPHTHALMOLOGY

## 2023-05-26 PROCEDURE — 3044F HG A1C LEVEL LT 7.0%: CPT | Mod: CPTII,S$GLB,, | Performed by: OPHTHALMOLOGY

## 2023-05-26 PROCEDURE — 92134 POSTERIOR SEGMENT OCT RETINA (OCULAR COHERENCE TOMOGRAPHY)-BOTH EYES: ICD-10-PCS | Mod: S$GLB,,, | Performed by: OPHTHALMOLOGY

## 2023-05-26 PROCEDURE — 1160F RVW MEDS BY RX/DR IN RCRD: CPT | Mod: CPTII,S$GLB,, | Performed by: OPHTHALMOLOGY

## 2023-05-26 PROCEDURE — 99999 PR PBB SHADOW E&M-EST. PATIENT-LVL III: CPT | Mod: PBBFAC,,, | Performed by: OPHTHALMOLOGY

## 2023-05-26 PROCEDURE — 92134 CPTRZ OPH DX IMG PST SGM RTA: CPT | Mod: S$GLB,,, | Performed by: OPHTHALMOLOGY

## 2023-05-26 PROCEDURE — 92014 PR EYE EXAM, EST PATIENT,COMPREHESV: ICD-10-PCS | Mod: S$GLB,,, | Performed by: OPHTHALMOLOGY

## 2023-05-26 PROCEDURE — 1159F PR MEDICATION LIST DOCUMENTED IN MEDICAL RECORD: ICD-10-PCS | Mod: CPTII,S$GLB,, | Performed by: OPHTHALMOLOGY

## 2023-05-26 NOTE — PROGRESS NOTES
HPI    OVERDUE 1 yr Comp Ex/OCTM    DLS: 11/11/2021 by Dr. Vinnie Reina MD     CC:     Blurred Va  Diplopia  Eye Pain   Floaters/Flashes  Headaches  Curtains/Shadows/Veils    Eye Meds: AT's OU prn         OCT - DME OD persistent but stable  OS - low grade parafoveal ME    Prior [facility]F FA - peripheral NP and dropout OU  Late macular leakage OU      A/P    1. Mod NPDR OU  T2 controlled without insulin    2. DME OD>OS    2/20 increase in DME  S/p Avastin OD x 5  S/p Ozurdex OD x 1 11/20    DME OD resolved after 1 Ozurdex    obs today    3. HTN Ret OU    BS/BP/cholc ontrol    4. NS OU  Increasing, but Sx ok for now    5. Considering HD soon  Fistula in place      1 year OCT

## 2023-05-28 DIAGNOSIS — D50.9 IRON DEFICIENCY ANEMIA, UNSPECIFIED IRON DEFICIENCY ANEMIA TYPE: ICD-10-CM

## 2023-05-28 RX ORDER — FERROUS GLUCONATE 324(38)MG
324 TABLET ORAL
Qty: 90 TABLET | Refills: 1 | Status: SHIPPED | OUTPATIENT
Start: 2023-05-28 | End: 2023-12-11 | Stop reason: SDUPTHER

## 2023-05-28 NOTE — PROGRESS NOTES
GI MA team - please tell patient that they are iron deficient and anemic and recommend that they take ferrous gluconate one 324mg pill once daily for next 3 months.    GI MA team -  Please order repeat fasting Hemoglobin, Iron/TIBC, and Ferritin in 8 weeks - Orders placed.     Referral has been placed again for EGD colonoscopy 2nd floor for further evaluation of iron deficiency anemia pre renal transplant evaluation and patient with pulmonary hypertension.

## 2023-05-29 ENCOUNTER — TELEPHONE (OUTPATIENT)
Dept: ENDOSCOPY | Facility: HOSPITAL | Age: 63
End: 2023-05-29
Payer: MEDICARE

## 2023-05-29 ENCOUNTER — PATIENT MESSAGE (OUTPATIENT)
Dept: ENDOSCOPY | Facility: HOSPITAL | Age: 63
End: 2023-05-29
Payer: MEDICARE

## 2023-05-29 NOTE — TELEPHONE ENCOUNTER
"Contacted the patient to schedule their procedure(s). The patient did not answer the call and left a voice message asking them to contact Endoscopy Scheduling as soon as possible at [phone].      Victorino Luna MD  [facility]  Caller: Unspecified (Yesterday,  2:40 PM)  Procedure: EGD/Colonoscopy     Diagnosis:  Pre renal transplant evaluation for iron deficiency anemia diarrhea     Procedure Timin-4 weeks     *If within 4 weeks selected, please mina as high priority*     *If greater than 12 weeks, please select "5-12 weeks" and delay sending until 2 months prior to requested date*     Provider: Any GI provider     Location: [address]     Additional Scheduling Information:  Chronic renal insufficiency and pulmonary artery hypertension     Prep Specifications:Standard prep     Have you attached a patient to this message: yes   "

## 2023-06-05 ENCOUNTER — LAB VISIT (OUTPATIENT)
Dept: LAB | Facility: HOSPITAL | Age: 63
End: 2023-06-05
Attending: INTERNAL MEDICINE
Payer: MEDICARE

## 2023-06-05 ENCOUNTER — OFFICE VISIT (OUTPATIENT)
Dept: TRANSPLANT | Facility: CLINIC | Age: 63
End: 2023-06-05
Payer: MEDICARE

## 2023-06-05 ENCOUNTER — OFFICE VISIT (OUTPATIENT)
Dept: NEPHROLOGY | Facility: CLINIC | Age: 63
End: 2023-06-05
Payer: MEDICARE

## 2023-06-05 VITALS
DIASTOLIC BLOOD PRESSURE: 80 MMHG | WEIGHT: 232.38 LBS | HEIGHT: 69 IN | HEART RATE: 74 BPM | OXYGEN SATURATION: 96 % | BODY MASS INDEX: 34.42 KG/M2 | SYSTOLIC BLOOD PRESSURE: 130 MMHG

## 2023-06-05 VITALS — HEART RATE: 68 BPM | HEIGHT: 69 IN | OXYGEN SATURATION: 100 % | BODY MASS INDEX: 34.61 KG/M2 | WEIGHT: 233.69 LBS

## 2023-06-05 DIAGNOSIS — I27.20 PULMONARY HYPERTENSION: Primary | ICD-10-CM

## 2023-06-05 DIAGNOSIS — I27.20 PULMONARY HYPERTENSION: ICD-10-CM

## 2023-06-05 DIAGNOSIS — E11.22 TYPE 2 DIABETES MELLITUS WITH STAGE 5 CHRONIC KIDNEY DISEASE NOT ON CHRONIC DIALYSIS, WITHOUT LONG-TERM CURRENT USE OF INSULIN: Chronic | ICD-10-CM

## 2023-06-05 DIAGNOSIS — N18.5 TYPE 2 DIABETES MELLITUS WITH STAGE 5 CHRONIC KIDNEY DISEASE NOT ON CHRONIC DIALYSIS, WITHOUT LONG-TERM CURRENT USE OF INSULIN: ICD-10-CM

## 2023-06-05 DIAGNOSIS — E11.22 TYPE 2 DIABETES MELLITUS WITH STAGE 5 CHRONIC KIDNEY DISEASE NOT ON CHRONIC DIALYSIS, WITHOUT LONG-TERM CURRENT USE OF INSULIN: ICD-10-CM

## 2023-06-05 DIAGNOSIS — N18.5 TYPE 2 DIABETES MELLITUS WITH STAGE 5 CHRONIC KIDNEY DISEASE NOT ON CHRONIC DIALYSIS, WITHOUT LONG-TERM CURRENT USE OF INSULIN: Chronic | ICD-10-CM

## 2023-06-05 DIAGNOSIS — R94.120 ABNORMAL AUDITORY FUNCTION STUDY: ICD-10-CM

## 2023-06-05 DIAGNOSIS — N18.6 ESRD (END STAGE RENAL DISEASE): ICD-10-CM

## 2023-06-05 DIAGNOSIS — N18.5 CHRONIC KIDNEY DISEASE (CKD), ACTIVE MEDICAL MANAGEMENT WITHOUT DIALYSIS, STAGE 5: ICD-10-CM

## 2023-06-05 DIAGNOSIS — N25.81 SECONDARY HYPERPARATHYROIDISM OF RENAL ORIGIN: ICD-10-CM

## 2023-06-05 DIAGNOSIS — N18.5 CHRONIC KIDNEY DISEASE (CKD), STAGE V: Primary | ICD-10-CM

## 2023-06-05 DIAGNOSIS — Z99.2: ICD-10-CM

## 2023-06-05 DIAGNOSIS — I15.0 RENOVASCULAR HYPERTENSION: Chronic | ICD-10-CM

## 2023-06-05 DIAGNOSIS — E11.42 DIABETIC POLYNEUROPATHY ASSOCIATED WITH TYPE 2 DIABETES MELLITUS: Chronic | ICD-10-CM

## 2023-06-05 LAB
ALBUMIN SERPL BCP-MCNC: 3.6 G/DL (ref 3.5–5.2)
ANION GAP SERPL CALC-SCNC: 10 MMOL/L (ref 8–16)
BASOPHILS # BLD AUTO: 0.05 K/UL (ref 0–0.2)
BASOPHILS NFR BLD: 0.5 % (ref 0–1.9)
BNP SERPL-MCNC: 185 PG/ML (ref 0–99)
BUN SERPL-MCNC: 50 MG/DL (ref 8–23)
CALCIUM SERPL-MCNC: 9.2 MG/DL (ref 8.7–10.5)
CHLORIDE SERPL-SCNC: 108 MMOL/L (ref 95–110)
CO2 SERPL-SCNC: 24 MMOL/L (ref 23–29)
CREAT SERPL-MCNC: 3.8 MG/DL (ref 0.5–1.4)
DIFFERENTIAL METHOD: ABNORMAL
EOSINOPHIL # BLD AUTO: 0.1 K/UL (ref 0–0.5)
EOSINOPHIL NFR BLD: 1.3 % (ref 0–8)
ERYTHROCYTE [DISTWIDTH] IN BLOOD BY AUTOMATED COUNT: 16 % (ref 11.5–14.5)
EST. GFR  (NO RACE VARIABLE): 12.9 ML/MIN/1.73 M^2
FERRITIN SERPL-MCNC: 475 NG/ML (ref 20–300)
GLUCOSE SERPL-MCNC: 100 MG/DL (ref 70–110)
HCT VFR BLD AUTO: 35.2 % (ref 37–48.5)
HGB BLD-MCNC: 11.4 G/DL (ref 12–16)
IMM GRANULOCYTES # BLD AUTO: 0.04 K/UL (ref 0–0.04)
IMM GRANULOCYTES NFR BLD AUTO: 0.4 % (ref 0–0.5)
LYMPHOCYTES # BLD AUTO: 2.1 K/UL (ref 1–4.8)
LYMPHOCYTES NFR BLD: 19 % (ref 18–48)
MCH RBC QN AUTO: 26.8 PG (ref 27–31)
MCHC RBC AUTO-ENTMCNC: 32.4 G/DL (ref 32–36)
MCV RBC AUTO: 83 FL (ref 82–98)
MONOCYTES # BLD AUTO: 0.7 K/UL (ref 0.3–1)
MONOCYTES NFR BLD: 6 % (ref 4–15)
NEUTROPHILS # BLD AUTO: 8 K/UL (ref 1.8–7.7)
NEUTROPHILS NFR BLD: 72.8 % (ref 38–73)
NRBC BLD-RTO: 0 /100 WBC
PHOSPHATE SERPL-MCNC: 5 MG/DL (ref 2.7–4.5)
PLATELET # BLD AUTO: 140 K/UL (ref 150–450)
PMV BLD AUTO: 10.9 FL (ref 9.2–12.9)
POTASSIUM SERPL-SCNC: 4.5 MMOL/L (ref 3.5–5.1)
PTH-INTACT SERPL-MCNC: 230.6 PG/ML (ref 9–77)
RBC # BLD AUTO: 4.25 M/UL (ref 4–5.4)
SODIUM SERPL-SCNC: 142 MMOL/L (ref 136–145)
WBC # BLD AUTO: 10.99 K/UL (ref 3.9–12.7)

## 2023-06-05 PROCEDURE — 3075F SYST BP GE 130 - 139MM HG: CPT | Mod: CPTII,S$GLB,, | Performed by: INTERNAL MEDICINE

## 2023-06-05 PROCEDURE — 3044F HG A1C LEVEL LT 7.0%: CPT | Mod: CPTII,S$GLB,, | Performed by: INTERNAL MEDICINE

## 2023-06-05 PROCEDURE — 3044F PR MOST RECENT HEMOGLOBIN A1C LEVEL <7.0%: ICD-10-PCS | Mod: CPTII,S$GLB,, | Performed by: INTERNAL MEDICINE

## 2023-06-05 PROCEDURE — 3079F DIAST BP 80-89 MM HG: CPT | Mod: CPTII,S$GLB,, | Performed by: INTERNAL MEDICINE

## 2023-06-05 PROCEDURE — 36415 COLL VENOUS BLD VENIPUNCTURE: CPT | Performed by: INTERNAL MEDICINE

## 2023-06-05 PROCEDURE — 1160F RVW MEDS BY RX/DR IN RCRD: CPT | Mod: CPTII,S$GLB,, | Performed by: INTERNAL MEDICINE

## 2023-06-05 PROCEDURE — 1159F MED LIST DOCD IN RCRD: CPT | Mod: CPTII,S$GLB,, | Performed by: INTERNAL MEDICINE

## 2023-06-05 PROCEDURE — 3008F BODY MASS INDEX DOCD: CPT | Mod: CPTII,S$GLB,, | Performed by: INTERNAL MEDICINE

## 2023-06-05 PROCEDURE — 85025 COMPLETE CBC W/AUTO DIFF WBC: CPT | Performed by: INTERNAL MEDICINE

## 2023-06-05 PROCEDURE — 3066F PR DOCUMENTATION OF TREATMENT FOR NEPHROPATHY: ICD-10-PCS | Mod: CPTII,S$GLB,, | Performed by: INTERNAL MEDICINE

## 2023-06-05 PROCEDURE — 3066F NEPHROPATHY DOC TX: CPT | Mod: CPTII,S$GLB,, | Performed by: INTERNAL MEDICINE

## 2023-06-05 PROCEDURE — 99215 PR OFFICE/OUTPT VISIT, EST, LEVL V, 40-54 MIN: ICD-10-PCS | Mod: S$GLB,,, | Performed by: INTERNAL MEDICINE

## 2023-06-05 PROCEDURE — 99214 PR OFFICE/OUTPT VISIT, EST, LEVL IV, 30-39 MIN: ICD-10-PCS | Mod: S$GLB,,, | Performed by: INTERNAL MEDICINE

## 2023-06-05 PROCEDURE — 3008F PR BODY MASS INDEX (BMI) DOCUMENTED: ICD-10-PCS | Mod: CPTII,S$GLB,, | Performed by: INTERNAL MEDICINE

## 2023-06-05 PROCEDURE — 99999 PR PBB SHADOW E&M-EST. PATIENT-LVL IV: CPT | Mod: PBBFAC,,, | Performed by: INTERNAL MEDICINE

## 2023-06-05 PROCEDURE — 1160F PR REVIEW ALL MEDS BY PRESCRIBER/CLIN PHARMACIST DOCUMENTED: ICD-10-PCS | Mod: CPTII,S$GLB,, | Performed by: INTERNAL MEDICINE

## 2023-06-05 PROCEDURE — 99214 OFFICE O/P EST MOD 30 MIN: CPT | Mod: S$GLB,,, | Performed by: INTERNAL MEDICINE

## 2023-06-05 PROCEDURE — 3079F PR MOST RECENT DIASTOLIC BLOOD PRESSURE 80-89 MM HG: ICD-10-PCS | Mod: CPTII,S$GLB,, | Performed by: INTERNAL MEDICINE

## 2023-06-05 PROCEDURE — 83970 ASSAY OF PARATHORMONE: CPT | Performed by: INTERNAL MEDICINE

## 2023-06-05 PROCEDURE — 99215 OFFICE O/P EST HI 40 MIN: CPT | Mod: S$GLB,,, | Performed by: INTERNAL MEDICINE

## 2023-06-05 PROCEDURE — 82728 ASSAY OF FERRITIN: CPT | Performed by: INTERNAL MEDICINE

## 2023-06-05 PROCEDURE — 1159F PR MEDICATION LIST DOCUMENTED IN MEDICAL RECORD: ICD-10-PCS | Mod: CPTII,S$GLB,, | Performed by: INTERNAL MEDICINE

## 2023-06-05 PROCEDURE — 99999 PR PBB SHADOW E&M-EST. PATIENT-LVL IV: ICD-10-PCS | Mod: PBBFAC,,, | Performed by: INTERNAL MEDICINE

## 2023-06-05 PROCEDURE — 80069 RENAL FUNCTION PANEL: CPT | Performed by: INTERNAL MEDICINE

## 2023-06-05 PROCEDURE — 83880 ASSAY OF NATRIURETIC PEPTIDE: CPT | Performed by: INTERNAL MEDICINE

## 2023-06-05 PROCEDURE — 82610 CYSTATIN C: CPT | Performed by: INTERNAL MEDICINE

## 2023-06-05 PROCEDURE — 3075F PR MOST RECENT SYSTOLIC BLOOD PRESS GE 130-139MM HG: ICD-10-PCS | Mod: CPTII,S$GLB,, | Performed by: INTERNAL MEDICINE

## 2023-06-05 RX ORDER — PARICALCITOL 1 UG/1
1 CAPSULE, LIQUID FILLED ORAL DAILY
Qty: 90 CAPSULE | Refills: 1 | Status: ON HOLD | OUTPATIENT
Start: 2023-06-05 | End: 2023-07-26 | Stop reason: HOSPADM

## 2023-06-05 RX ORDER — PARICALCITOL 1 UG/1
1 CAPSULE, LIQUID FILLED ORAL DAILY
COMMUNITY
End: 2023-06-05 | Stop reason: SDUPTHER

## 2023-06-05 NOTE — PROGRESS NOTES
REASON FOR VISIT: ckd    REFERRING PHYSICIAN: Elan Price MD    HISTORY OF PRESENT ILLNESS: 63 y.o. female who has long standing DMII with retinopathy, HTN, CVA, and multiple ARUNA's including one from urinary retention. patient was referred here for abnormal renal function, her renal function has been declining due to above mentioned reasons, she used to see us in the clinic but lost follow up.    Was on insulin since joseph to around 2020, has been taken off since then.    No major complains during this clinic visit.    ALLERGIES:  Review of patient's allergies indicates:   Allergen Reactions    Atorvastatin Other (See Comments)     Statin induced myalgia    Mushroom Hives    Peanut Anaphylaxis    Bactrim [sulfamethoxazole-trimethoprim] Hives    Gabapentin      Pruritis     Iodine     Peanut butter flavor     Penicillins Hives    Shellfish containing products Itching    Sulfa (sulfonamide antibiotics) Hives       MEDICATIONS:    Current Outpatient Medications:     carvediloL (COREG) 25 MG tablet, Take 1 tablet (25 mg total) by mouth 2 (two) times daily with meals., Disp: 180 tablet, Rfl: 3    cholecalciferol, vitamin D3, 125 mcg (5,000 unit) Tab, Take 1 tablet (5,000 Units total) by mouth once daily., Disp: 90 tablet, Rfl: 3    EScitalopram oxalate (LEXAPRO) 10 MG tablet, Take 1 tablet (10 mg total) by mouth once daily., Disp: 90 tablet, Rfl: 3    ferrous gluconate (FERGON) 324 MG tablet, Take 1 tablet (324 mg total) by mouth daily with breakfast., Disp: 90 tablet, Rfl: 1    furosemide (LASIX) 40 MG tablet, Take 2 tablets (80 mg total) by mouth 2 (two) times a day., Disp: 120 tablet, Rfl: 3    levocetirizine (XYZAL) 5 MG tablet, Take 1 tablet (5 mg total) by mouth daily as needed for Allergies., Disp: 90 tablet, Rfl: 3    miconazole (MICOTIN) 2 % cream, Apply topically 2 (two) times daily to Bilateral feet, Disp: 57 g, Rfl: 2    SYNTHROID 200 mcg tablet, Take 1 tablet (200 mcg total) by mouth before breakfast.,  "Disp: 90 tablet, Rfl: 3    traZODone (DESYREL) 50 MG tablet, Take 1 to 2 tablets ( mg total) by mouth nightly as needed for Insomnia., Disp: 120 tablet, Rfl: 3   Medication List with Changes/Refills   Current Medications    CARVEDILOL (COREG) 25 MG TABLET    Take 1 tablet (25 mg total) by mouth 2 (two) times daily with meals.    CHOLECALCIFEROL, VITAMIN D3, 125 MCG (5,000 UNIT) TAB    Take 1 tablet (5,000 Units total) by mouth once daily.    ESCITALOPRAM OXALATE (LEXAPRO) 10 MG TABLET    Take 1 tablet (10 mg total) by mouth once daily.    FERROUS GLUCONATE (FERGON) 324 MG TABLET    Take 1 tablet (324 mg total) by mouth daily with breakfast.    FUROSEMIDE (LASIX) 40 MG TABLET    Take 2 tablets (80 mg total) by mouth 2 (two) times a day.    LEVOCETIRIZINE (XYZAL) 5 MG TABLET    Take 1 tablet (5 mg total) by mouth daily as needed for Allergies.    MICONAZOLE (MICOTIN) 2 % CREAM    Apply topically 2 (two) times daily to Bilateral feet    SYNTHROID 200 MCG TABLET    Take 1 tablet (200 mcg total) by mouth before breakfast.    TRAZODONE (DESYREL) 50 MG TABLET    Take 1 to 2 tablets ( mg total) by mouth nightly as needed for Insomnia.        PHYSICAL EXAM:  /80   Pulse 74   Ht 5' 9" (1.753 m)   Wt 105.4 kg (232 lb 5.8 oz)   SpO2 96%   BMI 34.31 kg/m²     General: No distress, No fever or chills  Lungs:Clear to auscultation bilaterally, No Crackles  Heart: Regular rate and rhythm, no murmur, gallops or rubs  Abdomen: Soft, no tenderness, bowel sounds normal  Extremities: Atraumatic, no edema in LE  Skin: Turgor normal. No rashes or ulcers  Neurologic: No focal weakness, oriented.  Dialysis Access: Non applicable         LABS:  Lab Results   Component Value Date    CREATININE 4.0 (H) 03/27/2023       Prot/Creat Ratio, Urine   Date Value Ref Range Status   03/27/2023 0.60 (H) 0.00 - 0.20 Final   05/27/2019 1.37 (H) 0.00 - 0.20 Final   02/21/2019 1.70 (H) 0.00 - 0.20 Final       Lab Results   Component " "Value Date     03/27/2023    K 4.3 03/27/2023    CO2 25 03/27/2023       last PTH   Lab Results   Component Value Date    .0 (H) 03/27/2023    CALCIUM 9.5 03/27/2023    PHOS 5.3 (H) 03/27/2023       Lab Results   Component Value Date    HGB 10.6 (L) 05/16/2023        Lab Results   Component Value Date    HGBA1C 5.2 02/02/2023       Lab Results   Component Value Date    LDLCALC 105.6 11/17/2022           ASSESSMENT:    CKD V  Left arm AV fistula - Thrill felt  Metabolic acidosis  H/0 urinary retension  -Cr baseline around 3 and progressing. 2018 - ANCA, JB, IF,HIV, cryo, Hep panel wnl (s/p Haarvoni Rx for Hep c in 2015)  -likely from DMII (with retinopathy) /uncontrolled HTN/PVD (CVA with residual weakness).  -UPCR 0.5 g/g, used to be in nephrotic range before  -Renal US showed 10.3 and 10.8 cm kidneys.  -Was on sodium bicarbonate 650 mg BID, did not need it in the recent past  -Was on Lisinopril before not sure what prompted to stop it, Dont expect it to make a lot of difference though.  -Access working well, [facility] is chosen ([address], [place] [address])  -Transplant committee meeting 3/23 "Unable to determine transplant candidacy at this time due to need for 6 minute walk, PFT's, improved PA pressures on echo, reassess patient in clinic to BMI, and functional status/frailty. Another barrier is elevated PAP, can be relooked into after the dialysis is initiated". Patient at this time was deferring transplant. Will reconsult when starting dialysis.  - No uremic symptoms, no need to start dialysis. Explained patient and son about the uremic symptoms, they will reach out to me if they notice any.      HTN  Controlled better at home on Coreg and lasix 80 mg BID.    Anemia  -from ckd  -Hgb goal ~ 10  -S/P Iron infusions April 2023.    Secondary Hyperparathyroidism  -Phos high/Ca acceptable  -PTH trending up  -Neither calcitriol nor zemplar are being covered by insurance stating " this will be bundled with dialysis, but pt not on dialysis.  -Renvela is second choice per pharmacy, ordered phos low.          RTC in 2 months with labs      Thanks for allowing me to participate in the care of this patient.     3:32 PM

## 2023-06-05 NOTE — PROGRESS NOTES
Subjective:       JOSH Quezada is a very pleasant 64 yo black female with stage 5 CKD (not on HD), type 2 DM, HTN who is referred for evaluation of pulmonary HTN. Clinically reports WHO class II symptoms. Denies any chest pain or syncopal episodes. Most recent Echo was done 4 months ago and showed moderate pulmonary HTN with normal RV size and function. LV function was preserved with an estimated EF=60% (by my read).     2D Echo with CFD done on 2/6/2023  Moderate left atrial enlargement.  The left ventricle is normal in size with concentric hypertrophy and normal systolic function.  Left ventricular diastolic dysfunction.  Normal right ventricular size with normal right ventricular systolic function.  Mild mitral regurgitation.  Moderate tricuspid regurgitation.  Intermediate central venous pressure (8 mmHg).  There is pulmonary hypertension. The estimated PA systolic pressure is 58 mmHg.  Small lateral pericardial effusion.  The estimated PA systolic pressure is 58 mmHg.    Past Medical History:   Diagnosis Date    Acute respiratory failure with hypoxia and hypercapnia 05/26/2019    ATN (acute tubular necrosis) 02/08/2019    Chronic diastolic heart failure 02/07/2019    2-8-19 Mild left atrial enlargement. Mild left ventricular enlargement. Normal left ventricular systolic function. The estimated ejection fraction is 55% Indeterminate left ventricular diastolic function. Normal right ventricular systolic function. Mild mitral regurgitation. Mild to moderate tricuspid regurgitation. The estimated PA systolic pressure is 32 mm Hg Normal central venous pressure (3 m    CKD (chronic kidney disease) stage 4, GFR 15-29 ml/min 05/16/2018    CKD stage G3a/A3, GFR 45-59 and albumin creatinine ratio >300 mg/g 05/16/2018    CKD stage G3b/A3, GFR 30-44 and albumin creatinine ratio >300 mg/g 05/16/2018    Closed compression fracture of L2 lumbar vertebra 05/24/2019    Closed compression fracture of L2 lumbar vertebra  05/24/2019     IMO Regulatory Load October 2019    Controlled type 2 diabetes with retinopathy 04/09/2018    COVID-19 virus infection 7-1-2021 07/01/2021    CVA (cerebral vascular accident) 2017    Diabetic polyneuropathy associated with type 2 diabetes mellitus 12/04/2020    Encounter for blood transfusion     Essential hypertension 04/09/2018    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  7-31-19 Factor XI Activity 55 - 145 % 96       Gastroesophageal reflux disease without esophagitis 05/24/2019    Generalized anxiety disorder 11/08/2019    GERD (gastroesophageal reflux disease)     GIB (gastrointestinal bleeding) 10/18/2018    History of CVA (cerebrovascular accident) without residual deficits 11/27/2022    History of hepatitis C     s/p succesful Rx w/ Harvoni  - [id number] (cure) 2016    Hypertensive retinopathy, bilateral 12/10/2019    Mild nonproliferative diabetic retinopathy 05/02/2018    Mixed hyperlipidemia 04/09/2018    Myalgia due to statin 09/07/2022    Normocytic anemia 01/14/2022    NS (nuclear sclerosis), bilateral 12/10/2019    PNA (pneumonia) 05/28/2019    Postoperative hypothyroidism     Renovascular hypertension 04/09/2018    S/P kyphoplasty 08/14/2019 8/14/19: L2 kyphoplasty with Dr. Kwon     Slow transit constipation 05/27/2019    Type 2 diabetes mellitus with circulatory disorder, with long-term current use of insulin 04/09/2018    Type 2 diabetes mellitus with stage 3 chronic kidney disease, without long-term current use of insulin 04/09/2018    Type 2 diabetes mellitus with stage 3a chronic kidney disease, without long-term current use of insulin 03/03/2021    Type 2 diabetes mellitus with stage 3b chronic kidney disease, without long-term current use of insulin 09/07/2022     Past Surgical History:   Procedure Laterality Date    AV FISTULA PLACEMENT Left 10/27/2022    Procedure: CREATION, AV FISTULA;  Surgeon: Tirso Tavera MD;  Location: [facility] OR [address] [place] [address];  Service:  Peripheral Vascular;  Laterality: Left;  Brachio basilic 1st stage     BACK SURGERY  2017    CHOLECYSTECTOMY      [place]    COLONOSCOPY  2015    Dr Chino Herbert - brian prep. internal hemorrhoids. diverticulosis of sigmois. NO polyps. repeat due     FISTULOGRAM N/A 2022    Procedure: Fistulogram;  Surgeon: Tiros Tavera MD;  Location: [facility] CATH LAB;  Service: Peripheral Vascular;  Laterality: N/A;    FISTULOGRAM, WITH PTA Left 3/23/2023    Procedure: FISTULOGRAM, WITH PTA;  Surgeon: Tirso Tavera MD;  Location: [facility] OR [facility] FLR;  Service: Peripheral Vascular;  Laterality: Left;  LUE TRANSRADIAL CO2 FISTULOGRAM    1.9 min  22.99 mGy  4.3032 Gy.cm  200cc CO2  9ml TransRadial Solution    FIXATION KYPHOPLASTY Bilateral 2019    Procedure: L2 kyphoplasty Fluoro Globus;  Surgeon: Jeremie Kwon DO;  Location: [facility] OR;  Service: Neurosurgery;  Laterality: Bilateral;  GLOBUS MACRINA WINSLOW [phone] TEXTED HER @ 10:54AM ON 19  PRE-OP-BY RN  2019    HYSTERECTOMY      OOPHORECTOMY  1996    one    PERCUTANEOUS TRANSLUMINAL ANGIOPLASTY OF ARTERIOVENOUS FISTULA Left 2022    Procedure: PTA, AV FISTULA;  Surgeon: Tirso Tavera MD;  Location: [facility] CATH LAB;  Service: Peripheral Vascular;  Laterality: Left;    THYROIDECTOMY  2016    at [facility] for thyroid nodule    TONSILLECTOMY      TRANSPOSITION OF BASILIC VEIN Left 2023    Procedure: TRANSPOSITION, VEIN, BASILIC;  Surgeon: Tirso Tavera MD;  Location: [facility] OR [address] [place] [address];  Service: Peripheral Vascular;  Laterality: Left;     OB History          5    Para   5    Term   5            AB        Living             SAB        IAB        Ectopic        Multiple        Live Births                   Review of Systems   Constitutional: Negative. Negative for chills, decreased appetite, diaphoresis, fever, malaise/fatigue, night sweats, weight gain and weight loss.   Eyes: Negative.    Cardiovascular:  Positive for dyspnea on  "exertion. Negative for chest pain, claudication, cyanosis, irregular heartbeat, leg swelling, near-syncope, orthopnea, palpitations, paroxysmal nocturnal dyspnea and syncope.   Respiratory:  Negative for cough, hemoptysis and shortness of breath.    Endocrine: Negative.    Hematologic/Lymphatic: Negative.    Skin:  Negative for color change, dry skin and nail changes.   Musculoskeletal: Negative.    Gastrointestinal: Negative.    Genitourinary: Negative.    Neurological:  Negative for weakness.     Objective:   Pulse 68, height 5' 9" (1.753 m), weight 106 kg (233 lb 11 oz), SpO2 100 %.body mass index is 34.51 kg/m².  Physical Exam  Vitals and nursing note reviewed.   Constitutional:       Appearance: She is well-developed.   HENT:      Head: Normocephalic.   Eyes:      Pupils: Pupils are equal, round, and reactive to light.   Neck:      Vascular: No JVD.   Cardiovascular:      Rate and Rhythm: Normal rate and regular rhythm.      Chest Wall: PMI is not displaced.      Pulses: Intact distal pulses.      Heart sounds: Normal heart sounds. No murmur heard.    No friction rub. No gallop.   Pulmonary:      Effort: Pulmonary effort is normal.      Breath sounds: Normal breath sounds. No wheezing or rales.   Abdominal:      General: Bowel sounds are normal.      Palpations: Abdomen is soft.   Musculoskeletal:      Cervical back: Neck supple.   Neurological:      Mental Status: She is alert and oriented to person, place, and time.       Labs:    Chemistry        Component Value Date/Time     06/05/2023 1045    K 4.5 06/05/2023 1045     06/05/2023 1045    CO2 24 06/05/2023 1045    BUN 50 (H) 06/05/2023 1045    CREATININE 3.8 (H) 06/05/2023 1045     06/05/2023 1045        Component Value Date/Time    CALCIUM 9.2 06/05/2023 1045    ALKPHOS 91 03/27/2023 0935    AST 16 03/27/2023 0935    ALT 11 03/27/2023 0935    BILITOT 0.3 03/27/2023 0935    ESTGFRAFRICA 15.0 (A) 07/30/2022 1427    EGFRNONAA 13.0 (A) " 07/30/2022 1427          Magnesium   Date Value Ref Range Status   02/04/2023 1.8 1.6 - 2.6 mg/dL Final     Lab Results   Component Value Date    WBC 10.77 03/27/2023    HGB 10.6 (L) 05/16/2023    HCT 33.6 (L) 03/27/2023     03/27/2023     Lab Results   Component Value Date    INR 1.1 12/21/2022    INR 1.1 11/17/2022    INR 1.2 09/11/2022     BNP   Date Value Ref Range Status   03/27/2023 84 0 - 99 pg/mL Final     Comment:     Values of less than 100 pg/ml are consistent with non-CHF populations.   07/30/2022 64 0 - 99 pg/mL Final     Comment:     Values of less than 100 pg/ml are consistent with non-CHF populations.   02/24/2022 142 (H) 0 - 99 pg/mL Final     Comment:     Values of less than 100 pg/ml are consistent with non-CHF populations.       Assessment:      1. Pulmonary hypertension    2. Renovascular hypertension    3. Abnormal auditory function study    4. Chronic kidney disease (CKD), active medical management without dialysis, stage 5    5. Diabetic polyneuropathy associated with type 2 diabetes mellitus    6. Type 2 diabetes mellitus with stage 5 chronic kidney disease not on chronic dialysis, without long-term current use of insulin        Plan:   Moderate pulmonary HTN with normal RV size and function on Echo done in February. This is likely WHO group 2 PH. Recommend a RHC to confirm both degree and etiology of PH.  Recommend better BP control.  Her pericardial effusion is small and does not require any intervention.   Recommend 2 gram sodium restriction and 1500cc fluid restriction.  Encourage physical activity with graded exercise program.  Requested patient to weigh themselves daily, and to notify us if their weight increases by more than 3 lbs in 1 day or 5 lbs in 1 week.   Thank you for allowing me to participate in the care of this very pleasant patient. I will give further recs after reviewing her RHC. In the meantime, do not hesitate to contact me should you have any questions.       Klaus Pinzon MD

## 2023-06-05 NOTE — H&P (VIEW-ONLY)
Subjective:       JOSH Quezada is a very pleasant 62 yo black female with stage 5 CKD (not on HD), type 2 DM, HTN who is referred for evaluation of pulmonary HTN. Clinically reports WHO class II symptoms. Denies any chest pain or syncopal episodes. Most recent Echo was done 4 months ago and showed moderate pulmonary HTN with normal RV size and function. LV function was preserved with an estimated EF=60% (by my read).     2D Echo with CFD done on 2/6/2023  Moderate left atrial enlargement.  The left ventricle is normal in size with concentric hypertrophy and normal systolic function.  Left ventricular diastolic dysfunction.  Normal right ventricular size with normal right ventricular systolic function.  Mild mitral regurgitation.  Moderate tricuspid regurgitation.  Intermediate central venous pressure (8 mmHg).  There is pulmonary hypertension. The estimated PA systolic pressure is 58 mmHg.  Small lateral pericardial effusion.  The estimated PA systolic pressure is 58 mmHg.    Past Medical History:   Diagnosis Date    Acute respiratory failure with hypoxia and hypercapnia 05/26/2019    ATN (acute tubular necrosis) 02/08/2019    Chronic diastolic heart failure 02/07/2019    2-8-19 Mild left atrial enlargement. Mild left ventricular enlargement. Normal left ventricular systolic function. The estimated ejection fraction is 55% Indeterminate left ventricular diastolic function. Normal right ventricular systolic function. Mild mitral regurgitation. Mild to moderate tricuspid regurgitation. The estimated PA systolic pressure is 32 mm Hg Normal central venous pressure (3 m    CKD (chronic kidney disease) stage 4, GFR 15-29 ml/min 05/16/2018    CKD stage G3a/A3, GFR 45-59 and albumin creatinine ratio >300 mg/g 05/16/2018    CKD stage G3b/A3, GFR 30-44 and albumin creatinine ratio >300 mg/g 05/16/2018    Closed compression fracture of L2 lumbar vertebra 05/24/2019    Closed compression fracture of L2 lumbar vertebra  05/24/2019     IMO Regulatory Load October 2019    Controlled type 2 diabetes with retinopathy 04/09/2018    COVID-19 virus infection 7-1-2021 07/01/2021    CVA (cerebral vascular accident) 2017    Diabetic polyneuropathy associated with type 2 diabetes mellitus 12/04/2020    Encounter for blood transfusion     Essential hypertension 04/09/2018    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  7-31-19 Factor XI Activity 55 - 145 % 96       Gastroesophageal reflux disease without esophagitis 05/24/2019    Generalized anxiety disorder 11/08/2019    GERD (gastroesophageal reflux disease)     GIB (gastrointestinal bleeding) 10/18/2018    History of CVA (cerebrovascular accident) without residual deficits 11/27/2022    History of hepatitis C     s/p succesful Rx w/ Harvoni  - SVR12 (cure) 2016    Hypertensive retinopathy, bilateral 12/10/2019    Mild nonproliferative diabetic retinopathy 05/02/2018    Mixed hyperlipidemia 04/09/2018    Myalgia due to statin 09/07/2022    Normocytic anemia 01/14/2022    NS (nuclear sclerosis), bilateral 12/10/2019    PNA (pneumonia) 05/28/2019    Postoperative hypothyroidism     Renovascular hypertension 04/09/2018    S/P kyphoplasty 08/14/2019 8/14/19: L2 kyphoplasty with Dr. Kwon     Slow transit constipation 05/27/2019    Type 2 diabetes mellitus with circulatory disorder, with long-term current use of insulin 04/09/2018    Type 2 diabetes mellitus with stage 3 chronic kidney disease, without long-term current use of insulin 04/09/2018    Type 2 diabetes mellitus with stage 3a chronic kidney disease, without long-term current use of insulin 03/03/2021    Type 2 diabetes mellitus with stage 3b chronic kidney disease, without long-term current use of insulin 09/07/2022     Past Surgical History:   Procedure Laterality Date    AV FISTULA PLACEMENT Left 10/27/2022    Procedure: CREATION, AV FISTULA;  Surgeon: Tirso Tavera MD;  Location: [facility] OR [address] [place] [address];  Service:  Peripheral Vascular;  Laterality: Left;  Brachio basilic 1st stage     BACK SURGERY  2017    CHOLECYSTECTOMY      [place]    COLONOSCOPY  2015    Dr Chino Herbert - brian prep. internal hemorrhoids. diverticulosis of sigmois. NO polyps. repeat due     FISTULOGRAM N/A 2022    Procedure: Fistulogram;  Surgeon: Tirso Tavera MD;  Location: [facility] CATH LAB;  Service: Peripheral Vascular;  Laterality: N/A;    FISTULOGRAM, WITH PTA Left 3/23/2023    Procedure: FISTULOGRAM, WITH PTA;  Surgeon: Tirso Tavera MD;  Location: [facility] OR [facility] FLR;  Service: Peripheral Vascular;  Laterality: Left;  LUE TRANSRADIAL CO2 FISTULOGRAM    1.9 min  22.99 mGy  4.3032 Gy.cm  200cc CO2  9ml TransRadial Solution    FIXATION KYPHOPLASTY Bilateral 2019    Procedure: L2 kyphoplasty Fluoro Globus;  Surgeon: Jeremie Kwon DO;  Location: [facility] OR;  Service: Neurosurgery;  Laterality: Bilateral;  GLOBUS MACRINA WINSLOW [phone] TEXTED HER @ 10:54AM ON 19  PRE-OP-BY RN  2019    HYSTERECTOMY      OOPHORECTOMY  1996    one    PERCUTANEOUS TRANSLUMINAL ANGIOPLASTY OF ARTERIOVENOUS FISTULA Left 2022    Procedure: PTA, AV FISTULA;  Surgeon: Tirso Tavera MD;  Location: [facility] CATH LAB;  Service: Peripheral Vascular;  Laterality: Left;    THYROIDECTOMY  2016    at [facility] for thyroid nodule    TONSILLECTOMY      TRANSPOSITION OF BASILIC VEIN Left 2023    Procedure: TRANSPOSITION, VEIN, BASILIC;  Surgeon: Tirso Tavera MD;  Location: [facility] OR [address] [place] [address];  Service: Peripheral Vascular;  Laterality: Left;     OB History          5    Para   5    Term   5            AB        Living             SAB        IAB        Ectopic        Multiple        Live Births                   Review of Systems   Constitutional: Negative. Negative for chills, decreased appetite, diaphoresis, fever, malaise/fatigue, night sweats, weight gain and weight loss.   Eyes: Negative.    Cardiovascular:  Positive for dyspnea on  "exertion. Negative for chest pain, claudication, cyanosis, irregular heartbeat, leg swelling, near-syncope, orthopnea, palpitations, paroxysmal nocturnal dyspnea and syncope.   Respiratory:  Negative for cough, hemoptysis and shortness of breath.    Endocrine: Negative.    Hematologic/Lymphatic: Negative.    Skin:  Negative for color change, dry skin and nail changes.   Musculoskeletal: Negative.    Gastrointestinal: Negative.    Genitourinary: Negative.    Neurological:  Negative for weakness.     Objective:   Pulse 68, height 5' 9" (1.753 m), weight 106 kg (233 lb 11 oz), SpO2 100 %.body mass index is 34.51 kg/m².  Physical Exam  Vitals and nursing note reviewed.   Constitutional:       Appearance: She is well-developed.   HENT:      Head: Normocephalic.   Eyes:      Pupils: Pupils are equal, round, and reactive to light.   Neck:      Vascular: No JVD.   Cardiovascular:      Rate and Rhythm: Normal rate and regular rhythm.      Chest Wall: PMI is not displaced.      Pulses: Intact distal pulses.      Heart sounds: Normal heart sounds. No murmur heard.    No friction rub. No gallop.   Pulmonary:      Effort: Pulmonary effort is normal.      Breath sounds: Normal breath sounds. No wheezing or rales.   Abdominal:      General: Bowel sounds are normal.      Palpations: Abdomen is soft.   Musculoskeletal:      Cervical back: Neck supple.   Neurological:      Mental Status: She is alert and oriented to person, place, and time.       Labs:    Chemistry        Component Value Date/Time     06/05/2023 1045    K 4.5 06/05/2023 1045     06/05/2023 1045    CO2 24 06/05/2023 1045    BUN 50 (H) 06/05/2023 1045    CREATININE 3.8 (H) 06/05/2023 1045     06/05/2023 1045        Component Value Date/Time    CALCIUM 9.2 06/05/2023 1045    ALKPHOS 91 03/27/2023 0935    AST 16 03/27/2023 0935    ALT 11 03/27/2023 0935    BILITOT 0.3 03/27/2023 0935    ESTGFRAFRICA 15.0 (A) 07/30/2022 1427    EGFRNONAA 13.0 (A) " 07/30/2022 1427          Magnesium   Date Value Ref Range Status   02/04/2023 1.8 1.6 - 2.6 mg/dL Final     Lab Results   Component Value Date    WBC 10.77 03/27/2023    HGB 10.6 (L) 05/16/2023    HCT 33.6 (L) 03/27/2023     03/27/2023     Lab Results   Component Value Date    INR 1.1 12/21/2022    INR 1.1 11/17/2022    INR 1.2 09/11/2022     BNP   Date Value Ref Range Status   03/27/2023 84 0 - 99 pg/mL Final     Comment:     Values of less than 100 pg/ml are consistent with non-CHF populations.   07/30/2022 64 0 - 99 pg/mL Final     Comment:     Values of less than 100 pg/ml are consistent with non-CHF populations.   02/24/2022 142 (H) 0 - 99 pg/mL Final     Comment:     Values of less than 100 pg/ml are consistent with non-CHF populations.       Assessment:      1. Pulmonary hypertension    2. Renovascular hypertension    3. Abnormal auditory function study    4. Chronic kidney disease (CKD), active medical management without dialysis, stage 5    5. Diabetic polyneuropathy associated with type 2 diabetes mellitus    6. Type 2 diabetes mellitus with stage 5 chronic kidney disease not on chronic dialysis, without long-term current use of insulin        Plan:   Moderate pulmonary HTN with normal RV size and function on Echo done in February. This is likely WHO group 2 PH. Recommend a RHC to confirm both degree and etiology of PH.  Recommend better BP control.  Her pericardial effusion is small and does not require any intervention.   Recommend 2 gram sodium restriction and 1500cc fluid restriction.  Encourage physical activity with graded exercise program.  Requested patient to weigh themselves daily, and to notify us if their weight increases by more than 3 lbs in 1 day or 5 lbs in 1 week.   Thank you for allowing me to participate in the care of this very pleasant patient. I will give further recs after reviewing her RHC. In the meantime, do not hesitate to contact me should you have any questions.       Klaus Pinzon MD

## 2023-06-05 NOTE — Clinical Note
June 5, 2023        Cuba Kimball  [address]  [facility] [address]  Phone: [phone]  Fax: [phone]             [id number]  [address]  [facility] [address]  Phone: [phone]   Patient: Kathie Quezada   MR Number: [id number]   YOB: 1960   Date of Visit: 6/5/2023       Dear Dr. Cuba Kimball    Thank you for referring Kathie Quezada to me for evaluation. Attached you will find relevant portions of my assessment and plan of care.    If you have questions, please do not hesitate to call me. I look forward to following Kathie Quezada along with you.    Sincerely,    Klaus Pinzon MD    Enclosure    If you would like to receive this communication electronically, please contact [email or web address] or [phone] to request [email or web address] Link access.    [email or web address] Link is a tool which provides read-only access to select patient information with whom you have a relationship. Its easy to use and provides real time access to review your patients record including encounter summaries, notes, results, and demographic information.    If you feel you have received this communication in error or would no longer like to receive these types of communications, please e-mail [email or web address]

## 2023-06-06 LAB
CYSTATIN C SERPL-MCNC: 3.46 MG/L (ref 0.67–1.21)
GFR/BSA.PRED SERPLBLD CYS-BASED-ARV: 14 ML/MIN/BSA

## 2023-06-10 PROBLEM — Z99.2: Status: ACTIVE | Noted: 2023-06-10

## 2023-06-10 RX ORDER — CALCIUM ACETATE 667 MG/1
667 CAPSULE ORAL
Qty: 90 CAPSULE | Refills: 2 | Status: SHIPPED | OUTPATIENT
Start: 2023-06-10 | End: 2023-10-30 | Stop reason: SDUPTHER

## 2023-06-15 ENCOUNTER — DOCUMENTATION ONLY (OUTPATIENT)
Dept: TRANSPLANT | Facility: CLINIC | Age: 63
End: 2023-06-15
Payer: MEDICARE

## 2023-06-15 ENCOUNTER — HOSPITAL ENCOUNTER (OUTPATIENT)
Facility: HOSPITAL | Age: 63
Discharge: HOME | End: 2023-06-15
Attending: INTERNAL MEDICINE | Admitting: INTERNAL MEDICINE
Payer: MEDICARE

## 2023-06-15 VITALS
RESPIRATION RATE: 18 BRPM | HEIGHT: 70 IN | HEART RATE: 67 BPM | DIASTOLIC BLOOD PRESSURE: 76 MMHG | BODY MASS INDEX: 32.67 KG/M2 | OXYGEN SATURATION: 98 % | WEIGHT: 228.19 LBS | SYSTOLIC BLOOD PRESSURE: 168 MMHG | TEMPERATURE: 98 F

## 2023-06-15 DIAGNOSIS — I27.20 PULMONARY HYPERTENSION: ICD-10-CM

## 2023-06-15 DIAGNOSIS — I27.20 PULMONARY HTN: ICD-10-CM

## 2023-06-15 DIAGNOSIS — N18.5 CHRONIC KIDNEY DISEASE (CKD), STAGE V: Primary | ICD-10-CM

## 2023-06-15 PROCEDURE — 93451 PR RIGHT HEART CATH O2 SATURATION & CARDIAC OUTPUT: ICD-10-PCS | Mod: 26,,, | Performed by: INTERNAL MEDICINE

## 2023-06-15 PROCEDURE — 93451 RIGHT HEART CATH: CPT | Performed by: INTERNAL MEDICINE

## 2023-06-15 PROCEDURE — 25000003 PHARM REV CODE 250: Performed by: INTERNAL MEDICINE

## 2023-06-15 PROCEDURE — C1894 INTRO/SHEATH, NON-LASER: HCPCS | Performed by: INTERNAL MEDICINE

## 2023-06-15 PROCEDURE — 93451 RIGHT HEART CATH: CPT | Mod: 26,,, | Performed by: INTERNAL MEDICINE

## 2023-06-15 PROCEDURE — C1751 CATH, INF, PER/CENT/MIDLINE: HCPCS | Performed by: INTERNAL MEDICINE

## 2023-06-15 RX ORDER — LIDOCAINE HYDROCHLORIDE 20 MG/ML
INJECTION, SOLUTION INFILTRATION; PERINEURAL
Status: DISCONTINUED | OUTPATIENT
Start: 2023-06-15 | End: 2023-06-15 | Stop reason: HOSPADM

## 2023-06-15 NOTE — PLAN OF CARE
Received pt to floor from home accompanied by son.  AMELIA x 4. Denies pain or discomfort. Respirations even and unlabored. No distress noted. Pt stable.  Admit assessment complete.  Pt oriented to room and call bell placed within reach.  Will continue to monitor.

## 2023-06-15 NOTE — PATIENT INSTRUCTIONS

## 2023-06-15 NOTE — BRIEF OP NOTE
Patient tolerated the procedure well. Please see complete RHC procedure note for full details and results. Stable to return from cath lab to their hospital room.  Procedure: Right heart catheterization   Procedure date: 06/15/23    Discharge date: 06/15/23  Estimated blood loss: less than 10 cc  Complications: none  Condition at discharge: stable  Discharge instructions: no heavy lifting greater than 5 lbs and no bearing down/coughing without holding pressure over incision site.  Activity: as tolerated after 24 hours  Diet: as tolerated  Dispo: home

## 2023-06-15 NOTE — INTERVAL H&P NOTE
The patient has been examined and the H&P has been reviewed:    I concur with the findings and no changes have occurred since H&P was written.    Procedure risks, benefits and alternative options discussed and understood by patient/family.    Patient here for RHC, to evalaute for pulmonary hypertension. I have explained the risks, benefits, and alternatives of the procedure in detail.  The patient voices understanding and all questions have been answered.  The patient agrees to proceed as planned.        There are no hospital problems to display for this patient.

## 2023-06-15 NOTE — Clinical Note
The catheter was repositioned into the pulmonary artery. Hemodynamics were performed.  The patient's O2 saturation measured 65%.

## 2023-06-15 NOTE — DISCHARGE SUMMARY
[facility] - Short Stay Cardiac Unit  Discharge Note  Short Stay    Procedure(s) (LRB):  INSERTION, CATHETER, RIGHT HEART (Right)      OUTCOME: Patient tolerated treatment/procedure well without complication and is now ready for discharge.    DISPOSITION: Home or Self Care    FINAL DIAGNOSIS:  <principal problem not specified>    FOLLOWUP: In clinic    DISCHARGE INSTRUCTIONS:  No discharge procedures on file.     TIME SPENT ON DISCHARGE: 25 minutes

## 2023-06-15 NOTE — NURSING
Patient discharged per MD orders. Instructions given to pt and son on medications, wound care, activity, signs of infection, when to call MD, and follow up appointments. Pt verbalized understanding.  Patient AAOx3, VSS, no c/o pain or discomfort at this time. Patient refused wheelchair and ambulated off unit with son at side.  Pt stable.

## 2023-06-15 NOTE — HPI
Kathie Quezada is a very pleasant 62 yo black female with stage 5 CKD (not on HD), type 2 DM, HTN who is referred for evaluation of pulmonary HTN. Clinically reports WHO class II symptoms. Denies any chest pain or syncopal episodes. Most recent Echo was done 4 months ago and showed moderate pulmonary HTN with normal RV size and function. LV function was preserved with an estimated EF=60% (by my read).      2D Echo with CFD done on 2/6/2023  Moderate left atrial enlargement.  The left ventricle is normal in size with concentric hypertrophy and normal systolic function.  Left ventricular diastolic dysfunction.  Normal right ventricular size with normal right ventricular systolic function.  Mild mitral regurgitation.  Moderate tricuspid regurgitation.  Intermediate central venous pressure (8 mmHg).  There is pulmonary hypertension. The estimated PA systolic pressure is 58 mmHg.  Small lateral pericardial effusion.  The estimated PA systolic pressure is 58 mmHg.    Presenting for RHC.

## 2023-06-15 NOTE — PROGRESS NOTES
Patient RHC done today was reviewed; Mildly increased right and left sided filling pressures.Mild pulmonary hypertension. RA: 12/ 11/ 10 RV: 40/ 7/ 11 PA: 40/ 18/ 25 PWP: 22/ 21/ 17. Cardiac output was 5.27  by Ramya. Cardiac index is 2.39 L/min/m2.. O2 Sat: PA 65%. TPG  8  \\PVR  1.51. SVR 1594. Normal CO/CI. Systolic blood pressures 170-180 throughout procedure. From  a cardiac standpoint, his pulmonary HTN is mild and does not preclude Kidney transplant. Recommend afterload reduction (ie. BP control).     Klaus Pinzon MD

## 2023-06-15 NOTE — PLAN OF CARE
4/1/19 Mom to [facility] Lovenox education.I covered all information per written material.Mom correctly returned withdrawing medication form a vial and giving subcutaneous into PLC model,able to answer teach back,and verbalized understanding of content presented.Mom has been giving subcutaneous Lovenox to her child with nursing supervision, withdrawing from a vial new for her today.Mom will again read the written material and continue to practice skills.Also see education flow sheet.     Received pt back to [facility] via wheelchair accompanied by staff.  AAO x 4.  Denies pain or discomfort.  Respirations even and unlabored.  No distress noted.  Dressing to the right neck clean, dry and intact.  No bleeding or hematoma noted.  Pt stable.  Pt oriented to room and call bell placed within reach.

## 2023-06-22 ENCOUNTER — TELEPHONE (OUTPATIENT)
Dept: TRANSPLANT | Facility: CLINIC | Age: 63
End: 2023-06-22

## 2023-06-23 ENCOUNTER — TELEPHONE (OUTPATIENT)
Dept: TRANSPLANT | Facility: CLINIC | Age: 63
End: 2023-06-23
Payer: MEDICARE

## 2023-06-23 NOTE — TELEPHONE ENCOUNTER
Contacted patient to review initial intake information. Patient reports the followin. Can you walk up a flight of stairs without getting short of breath or stopping? Yes   2. Can you walk one block without getting short of breath or having to stop? Yes   3. Do you use oxygen? No   4. Do you use a cane, walker, or wheel chair to assist in mobility? No   5. Have you been hospitalized or had recent surgery in the last 6 months? No    A. Stroke   B. Heart surgery or heart catheterization   C. Broken bone  6. Do you have any cuts, open sores (ulcers), or wounds anywhere on your body? No   7. Do you go to dialysis? No   8. Preferred appointment day? Anyday  9. Caregiver? One of her kids or Sister (Tomas)    Patient is aware the next steps will include completing records and compliance verification. Patient is aware once provider review and insurance authorization is received we will contact patient to schedule initial visit. Patient is aware that initial visit will begin prior to / at 7 am and will conclude at approximately 3 pm on date of appointment. All questions answered at this time.

## 2023-06-28 ENCOUNTER — TELEPHONE (OUTPATIENT)
Dept: ENDOSCOPY | Facility: HOSPITAL | Age: 63
End: 2023-06-28
Payer: MEDICARE

## 2023-06-28 NOTE — TELEPHONE ENCOUNTER
Called pt to schedule EGD & Colonoscopy.  Pt states that symptoms have resolved and wants to hold off for now.

## 2023-07-03 ENCOUNTER — PES CALL (OUTPATIENT)
Dept: ADMINISTRATIVE | Facility: CLINIC | Age: 63
End: 2023-07-03
Payer: MEDICARE

## 2023-07-03 ENCOUNTER — TELEPHONE (OUTPATIENT)
Dept: PHARMACY | Facility: CLINIC | Age: 63
End: 2023-07-03
Payer: MEDICARE

## 2023-07-07 ENCOUNTER — TELEPHONE (OUTPATIENT)
Dept: TRANSPLANT | Facility: CLINIC | Age: 63
End: 2023-07-07
Payer: MEDICARE

## 2023-07-10 ENCOUNTER — OFFICE VISIT (OUTPATIENT)
Dept: NEPHROLOGY | Facility: CLINIC | Age: 63
End: 2023-07-10
Payer: MEDICARE

## 2023-07-10 ENCOUNTER — LAB VISIT (OUTPATIENT)
Dept: LAB | Facility: HOSPITAL | Age: 63
End: 2023-07-10
Attending: INTERNAL MEDICINE
Payer: MEDICARE

## 2023-07-10 VITALS
HEART RATE: 73 BPM | OXYGEN SATURATION: 98 % | DIASTOLIC BLOOD PRESSURE: 80 MMHG | SYSTOLIC BLOOD PRESSURE: 140 MMHG | HEIGHT: 70 IN | WEIGHT: 233.44 LBS | BODY MASS INDEX: 33.42 KG/M2

## 2023-07-10 DIAGNOSIS — N18.5 CHRONIC KIDNEY DISEASE (CKD), STAGE V: ICD-10-CM

## 2023-07-10 DIAGNOSIS — N18.5 CHRONIC KIDNEY DISEASE (CKD), STAGE V: Primary | ICD-10-CM

## 2023-07-10 LAB
ALBUMIN SERPL BCP-MCNC: 3.5 G/DL (ref 3.5–5.2)
ANION GAP SERPL CALC-SCNC: 8 MMOL/L (ref 8–16)
BASOPHILS # BLD AUTO: 0.06 K/UL (ref 0–0.2)
BASOPHILS NFR BLD: 0.6 % (ref 0–1.9)
BUN SERPL-MCNC: 44 MG/DL (ref 8–23)
CALCIUM SERPL-MCNC: 9.6 MG/DL (ref 8.7–10.5)
CHLORIDE SERPL-SCNC: 108 MMOL/L (ref 95–110)
CO2 SERPL-SCNC: 26 MMOL/L (ref 23–29)
CREAT SERPL-MCNC: 3.9 MG/DL (ref 0.5–1.4)
DIFFERENTIAL METHOD: ABNORMAL
EOSINOPHIL # BLD AUTO: 0.2 K/UL (ref 0–0.5)
EOSINOPHIL NFR BLD: 1.7 % (ref 0–8)
ERYTHROCYTE [DISTWIDTH] IN BLOOD BY AUTOMATED COUNT: 14.1 % (ref 11.5–14.5)
EST. GFR  (NO RACE VARIABLE): 12.5 ML/MIN/1.73 M^2
GLUCOSE SERPL-MCNC: 130 MG/DL (ref 70–110)
HBV CORE AB SERPL QL IA: REACTIVE
HBV SURFACE AB SER-ACNC: 95.52 MIU/ML
HBV SURFACE AB SER-ACNC: REACTIVE M[IU]/ML
HBV SURFACE AG SERPL QL IA: NORMAL
HCT VFR BLD AUTO: 36.1 % (ref 37–48.5)
HGB BLD-MCNC: 11.4 G/DL (ref 12–16)
IMM GRANULOCYTES # BLD AUTO: 0.04 K/UL (ref 0–0.04)
IMM GRANULOCYTES NFR BLD AUTO: 0.4 % (ref 0–0.5)
LYMPHOCYTES # BLD AUTO: 2.4 K/UL (ref 1–4.8)
LYMPHOCYTES NFR BLD: 22.3 % (ref 18–48)
MCH RBC QN AUTO: 27.6 PG (ref 27–31)
MCHC RBC AUTO-ENTMCNC: 31.6 G/DL (ref 32–36)
MCV RBC AUTO: 87 FL (ref 82–98)
MONOCYTES # BLD AUTO: 0.5 K/UL (ref 0.3–1)
MONOCYTES NFR BLD: 5 % (ref 4–15)
NEUTROPHILS # BLD AUTO: 7.6 K/UL (ref 1.8–7.7)
NEUTROPHILS NFR BLD: 70 % (ref 38–73)
NRBC BLD-RTO: 0 /100 WBC
PHOSPHATE SERPL-MCNC: 4.3 MG/DL (ref 2.7–4.5)
PLATELET # BLD AUTO: 183 K/UL (ref 150–450)
PMV BLD AUTO: 10.3 FL (ref 9.2–12.9)
POTASSIUM SERPL-SCNC: 4.4 MMOL/L (ref 3.5–5.1)
PTH-INTACT SERPL-MCNC: 153 PG/ML (ref 9–77)
RBC # BLD AUTO: 4.13 M/UL (ref 4–5.4)
SODIUM SERPL-SCNC: 142 MMOL/L (ref 136–145)
WBC # BLD AUTO: 10.8 K/UL (ref 3.9–12.7)

## 2023-07-10 PROCEDURE — 1159F PR MEDICATION LIST DOCUMENTED IN MEDICAL RECORD: ICD-10-PCS | Mod: HCNC,CPTII,S$GLB, | Performed by: INTERNAL MEDICINE

## 2023-07-10 PROCEDURE — 3079F PR MOST RECENT DIASTOLIC BLOOD PRESSURE 80-89 MM HG: ICD-10-PCS | Mod: HCNC,CPTII,S$GLB, | Performed by: INTERNAL MEDICINE

## 2023-07-10 PROCEDURE — 1160F PR REVIEW ALL MEDS BY PRESCRIBER/CLIN PHARMACIST DOCUMENTED: ICD-10-PCS | Mod: HCNC,CPTII,S$GLB, | Performed by: INTERNAL MEDICINE

## 2023-07-10 PROCEDURE — 86704 HEP B CORE ANTIBODY TOTAL: CPT | Mod: HCNC,NTX | Performed by: INTERNAL MEDICINE

## 2023-07-10 PROCEDURE — 80069 RENAL FUNCTION PANEL: CPT | Mod: HCNC,TXP | Performed by: INTERNAL MEDICINE

## 2023-07-10 PROCEDURE — 3077F PR MOST RECENT SYSTOLIC BLOOD PRESSURE >= 140 MM HG: ICD-10-PCS | Mod: HCNC,CPTII,S$GLB, | Performed by: INTERNAL MEDICINE

## 2023-07-10 PROCEDURE — 3077F SYST BP >= 140 MM HG: CPT | Mod: HCNC,CPTII,S$GLB, | Performed by: INTERNAL MEDICINE

## 2023-07-10 PROCEDURE — 3066F PR DOCUMENTATION OF TREATMENT FOR NEPHROPATHY: ICD-10-PCS | Mod: HCNC,CPTII,S$GLB, | Performed by: INTERNAL MEDICINE

## 2023-07-10 PROCEDURE — 85025 COMPLETE CBC W/AUTO DIFF WBC: CPT | Mod: HCNC,TXP | Performed by: INTERNAL MEDICINE

## 2023-07-10 PROCEDURE — 3066F NEPHROPATHY DOC TX: CPT | Mod: HCNC,CPTII,S$GLB, | Performed by: INTERNAL MEDICINE

## 2023-07-10 PROCEDURE — 3044F HG A1C LEVEL LT 7.0%: CPT | Mod: HCNC,CPTII,S$GLB, | Performed by: INTERNAL MEDICINE

## 2023-07-10 PROCEDURE — 3079F DIAST BP 80-89 MM HG: CPT | Mod: HCNC,CPTII,S$GLB, | Performed by: INTERNAL MEDICINE

## 2023-07-10 PROCEDURE — 99215 OFFICE O/P EST HI 40 MIN: CPT | Mod: HCNC,S$GLB,, | Performed by: INTERNAL MEDICINE

## 2023-07-10 PROCEDURE — 1160F RVW MEDS BY RX/DR IN RCRD: CPT | Mod: HCNC,CPTII,S$GLB, | Performed by: INTERNAL MEDICINE

## 2023-07-10 PROCEDURE — 86480 TB TEST CELL IMMUN MEASURE: CPT | Mod: HCNC,NTX | Performed by: INTERNAL MEDICINE

## 2023-07-10 PROCEDURE — 36415 COLL VENOUS BLD VENIPUNCTURE: CPT | Mod: HCNC,TXP | Performed by: INTERNAL MEDICINE

## 2023-07-10 PROCEDURE — 87340 HEPATITIS B SURFACE AG IA: CPT | Mod: HCNC,TXP | Performed by: INTERNAL MEDICINE

## 2023-07-10 PROCEDURE — 99999 PR PBB SHADOW E&M-EST. PATIENT-LVL IV: ICD-10-PCS | Mod: PBBFAC,HCNC,, | Performed by: INTERNAL MEDICINE

## 2023-07-10 PROCEDURE — 3008F BODY MASS INDEX DOCD: CPT | Mod: HCNC,CPTII,S$GLB, | Performed by: INTERNAL MEDICINE

## 2023-07-10 PROCEDURE — 3008F PR BODY MASS INDEX (BMI) DOCUMENTED: ICD-10-PCS | Mod: HCNC,CPTII,S$GLB, | Performed by: INTERNAL MEDICINE

## 2023-07-10 PROCEDURE — 3044F PR MOST RECENT HEMOGLOBIN A1C LEVEL <7.0%: ICD-10-PCS | Mod: HCNC,CPTII,S$GLB, | Performed by: INTERNAL MEDICINE

## 2023-07-10 PROCEDURE — 86706 HEP B SURFACE ANTIBODY: CPT | Mod: 91,HCNC,NTX | Performed by: INTERNAL MEDICINE

## 2023-07-10 PROCEDURE — 99215 PR OFFICE/OUTPT VISIT, EST, LEVL V, 40-54 MIN: ICD-10-PCS | Mod: HCNC,S$GLB,, | Performed by: INTERNAL MEDICINE

## 2023-07-10 PROCEDURE — 99999 PR PBB SHADOW E&M-EST. PATIENT-LVL IV: CPT | Mod: PBBFAC,HCNC,, | Performed by: INTERNAL MEDICINE

## 2023-07-10 PROCEDURE — 83970 ASSAY OF PARATHORMONE: CPT | Mod: HCNC,TXP | Performed by: INTERNAL MEDICINE

## 2023-07-10 PROCEDURE — 1159F MED LIST DOCD IN RCRD: CPT | Mod: HCNC,CPTII,S$GLB, | Performed by: INTERNAL MEDICINE

## 2023-07-10 NOTE — PROGRESS NOTES
REASON FOR VISIT: ckd    REFERRING PHYSICIAN: Elan Price MD    HISTORY OF PRESENT ILLNESS: 63 y.o. female who has long standing DMII with retinopathy, HTN, CVA, and multiple ARUNA's including one from urinary retention. patient was referred here for abnormal renal function, her renal function has been declining due to above mentioned reasons, she used to see us in the clinic but lost follow up.    Was on insulin since joseph to around 2020, has been taken off since then.    No major complains during this clinic visit.    ALLERGIES:  Review of patient's allergies indicates:   Allergen Reactions    Atorvastatin Other (See Comments)     Statin induced myalgia    Mushroom Hives    Peanut Anaphylaxis    Bactrim [sulfamethoxazole-trimethoprim] Hives    Gabapentin      Pruritis     Iodine     Peanut butter flavor     Penicillins Hives    Shellfish containing products Itching    Sulfa (sulfonamide antibiotics) Hives       MEDICATIONS:    Current Outpatient Medications:     calcium acetate,phosphat bind, (PHOSLO) 667 mg capsule, Take 1 capsule (667 mg total) by mouth 3 (three) times daily with meals., Disp: 90 capsule, Rfl: 2    carvediloL (COREG) 25 MG tablet, Take 1 tablet (25 mg total) by mouth 2 (two) times daily with meals., Disp: 180 tablet, Rfl: 3    cholecalciferol, vitamin D3, 125 mcg (5,000 unit) Tab, Take 1 tablet (5,000 Units total) by mouth once daily., Disp: 90 tablet, Rfl: 3    EScitalopram oxalate (LEXAPRO) 10 MG tablet, Take 1 tablet (10 mg total) by mouth once daily., Disp: 90 tablet, Rfl: 3    ferrous gluconate (FERGON) 324 MG tablet, Take 1 tablet (324 mg total) by mouth daily with breakfast., Disp: 90 tablet, Rfl: 1    furosemide (LASIX) 40 MG tablet, Take 2 tablets (80 mg total) by mouth 2 (two) times a day., Disp: 120 tablet, Rfl: 3    levocetirizine (XYZAL) 5 MG tablet, Take 1 tablet (5 mg total) by mouth daily as needed for Allergies., Disp: 90 tablet, Rfl: 3    miconazole (MICOTIN) 2 % cream,  "Apply topically 2 (two) times daily to Bilateral feet, Disp: 57 g, Rfl: 2    paricalcitoL (ZEMPLAR) 1 MCG capsule, Take 1 capsule (1 mcg total) by mouth once daily., Disp: 90 capsule, Rfl: 1    SYNTHROID 200 mcg tablet, Take 1 tablet (200 mcg total) by mouth before breakfast., Disp: 90 tablet, Rfl: 3    traZODone (DESYREL) 50 MG tablet, Take 1 to 2 tablets ( mg total) by mouth nightly as needed for Insomnia., Disp: 120 tablet, Rfl: 3   Medication List with Changes/Refills   Current Medications    CALCIUM ACETATE,PHOSPHAT BIND, (PHOSLO) 667 MG CAPSULE    Take 1 capsule (667 mg total) by mouth 3 (three) times daily with meals.    CARVEDILOL (COREG) 25 MG TABLET    Take 1 tablet (25 mg total) by mouth 2 (two) times daily with meals.    CHOLECALCIFEROL, VITAMIN D3, 125 MCG (5,000 UNIT) TAB    Take 1 tablet (5,000 Units total) by mouth once daily.    ESCITALOPRAM OXALATE (LEXAPRO) 10 MG TABLET    Take 1 tablet (10 mg total) by mouth once daily.    FERROUS GLUCONATE (FERGON) 324 MG TABLET    Take 1 tablet (324 mg total) by mouth daily with breakfast.    FUROSEMIDE (LASIX) 40 MG TABLET    Take 2 tablets (80 mg total) by mouth 2 (two) times a day.    LEVOCETIRIZINE (XYZAL) 5 MG TABLET    Take 1 tablet (5 mg total) by mouth daily as needed for Allergies.    MICONAZOLE (MICOTIN) 2 % CREAM    Apply topically 2 (two) times daily to Bilateral feet    PARICALCITOL (ZEMPLAR) 1 MCG CAPSULE    Take 1 capsule (1 mcg total) by mouth once daily.    SYNTHROID 200 MCG TABLET    Take 1 tablet (200 mcg total) by mouth before breakfast.    TRAZODONE (DESYREL) 50 MG TABLET    Take 1 to 2 tablets ( mg total) by mouth nightly as needed for Insomnia.        PHYSICAL EXAM:  BP (!) 140/80   Pulse 73   Ht 5' 10" (1.778 m)   Wt 105.9 kg (233 lb 7.5 oz)   SpO2 98%   BMI 33.50 kg/m²     General: No distress, No fever or chills  Lungs:Clear to auscultation bilaterally, No Crackles  Heart: Regular rate and rhythm, no murmur, gallops or " rubs  Abdomen: Soft, no tenderness, bowel sounds normal  Extremities: Atraumatic, no edema in LE  Skin: Turgor normal. No rashes or ulcers  Neurologic: No focal weakness, oriented.  Dialysis Access: Non applicable         LABS:  Lab Results   Component Value Date    CREATININE 4.3 (H) 06/15/2023       Prot/Creat Ratio, Urine   Date Value Ref Range Status   03/27/2023 0.60 (H) 0.00 - 0.20 Final   05/27/2019 1.37 (H) 0.00 - 0.20 Final   02/21/2019 1.70 (H) 0.00 - 0.20 Final       Lab Results   Component Value Date     06/15/2023    K 4.2 06/15/2023    CO2 23 06/15/2023       last PTH   Lab Results   Component Value Date    .6 (H) 06/05/2023    CALCIUM 9.4 06/15/2023    PHOS 5.0 (H) 06/05/2023       Lab Results   Component Value Date    HGB 11.7 (L) 06/15/2023        Lab Results   Component Value Date    HGBA1C 5.2 02/02/2023       Lab Results   Component Value Date    LDLCALC 105.6 11/17/2022           ASSESSMENT:    CKD V  Left arm AV fistula - Thrill felt  Metabolic acidosis  H/0 urinary retension  -Cr baseline around 3 and progressing. 2018 - ANCA, JB, IF,HIV, cryo, Hep panel wnl (s/p Haarvoni Rx for Hep c in 2015)  -likely from DMII (with retinopathy) /uncontrolled HTN/PVD (CVA with residual weakness).  -UPCR 0.5 g/g, used to be in nephrotic range before  -Renal US showed 10.3 and 10.8 cm kidneys.  -Was on sodium bicarbonate 650 mg BID, did not need it in the recent past  -Was on Lisinopril before not sure what prompted to stop it, Dont expect it to make a lot of difference though.  -Access working well, [facility] is chosen ([address], [place] [address])  -Re consulted transplant.  - Metallic taste in mouth and itching for past 3-4 weeks, referring to start dialysis.    HTN  Controlled better at home on Coreg and lasix 80 mg BID.    Anemia  -from ckd  -Hgb goal ~ 10  -S/P Iron infusions April 2023.    Secondary Hyperparathyroidism  -Phos high/Ca acceptable  -PTH trending  up  -Neither calcitriol nor zemplar are being covered by insurance stating this will be bundled with dialysis, but pt not on dialysis.  -Renvela is second choice per pharmacy, ordered phos low.          Starting dialysis/referral to  placed.      Thanks for allowing me to participate in the care of this patient.     3:32 PM

## 2023-07-11 ENCOUNTER — TELEPHONE (OUTPATIENT)
Dept: NEPHROLOGY | Facility: CLINIC | Age: 63
End: 2023-07-11
Payer: MEDICARE

## 2023-07-11 ENCOUNTER — TELEPHONE (OUTPATIENT)
Dept: TRANSPLANT | Facility: CLINIC | Age: 63
End: 2023-07-11
Payer: MEDICARE

## 2023-07-11 DIAGNOSIS — Z76.82 ORGAN TRANSPLANT CANDIDATE: Primary | ICD-10-CM

## 2023-07-11 LAB
GAMMA INTERFERON BACKGROUND BLD IA-ACNC: 0.06 IU/ML
M TB IFN-G CD4+ BCKGRND COR BLD-ACNC: 0.01 IU/ML
M TB IFN-G CD4+ BCKGRND COR BLD-ACNC: 0.02 IU/ML
MITOGEN IGNF BCKGRD COR BLD-ACNC: 9.94 IU/ML
TB GOLD PLUS: NEGATIVE

## 2023-07-11 NOTE — TELEPHONE ENCOUNTER
Referral successfully faxed to [facility] for outpt dialysis coordination.    SW will continue with updates.    Anjelica Magaña, LMSW [facility]  [phone]      Your fax has been successfully sent to [phone] at [phone].  ------------------------------------------------------------  From: [id number]  ------------------------------------------------------------  7/11/2023 11:35:23 AM Transmission Record          Sent to [phone] with remote ID "          Result: (0/339;0/0) Success          Page record: 1 - 41          Elapsed time: 12:02 on channel 19

## 2023-07-11 NOTE — TELEPHONE ENCOUNTER
JERMAINE received call from [facility] pt coordinator Amelia. Amelia stated all required documents for outpt dialysis coordination have been received. Referral has been transferred to [facility] pt coordinator Julia ([phone]).     JERMAINE will continue to follow with updates.    Anjelica Magaña LMSW  [facility]  X [phone]

## 2023-07-13 ENCOUNTER — TELEPHONE (OUTPATIENT)
Dept: NEPHROLOGY | Facility: CLINIC | Age: 63
End: 2023-07-13
Payer: MEDICARE

## 2023-07-13 NOTE — TELEPHONE ENCOUNTER
SW spoke with pt and informed pt had been accepted to [facility] dialysis. Pt can begin dialysis Friday (07/21/2023) at 2:15PM. Pt will follow on a MWF @ 2:45PM schedule.    SW to email a copy of [facility] welcome letter to pt's requested email address: [email or web address]    Pt verbalized understanding. All questions addressed.    Anjelica Magaña LMSW  [facility]  X [phone]

## 2023-07-21 ENCOUNTER — TELEPHONE (OUTPATIENT)
Dept: VASCULAR SURGERY | Facility: CLINIC | Age: 63
End: 2023-07-21
Payer: MEDICARE

## 2023-07-21 ENCOUNTER — TELEPHONE (OUTPATIENT)
Dept: NEPHROLOGY | Facility: CLINIC | Age: 63
End: 2023-07-21
Payer: MEDICARE

## 2023-07-21 ENCOUNTER — HOSPITAL ENCOUNTER (OUTPATIENT)
Facility: HOSPITAL | Age: 63
Discharge: HOME | End: 2023-07-26
Attending: EMERGENCY MEDICINE | Admitting: INTERNAL MEDICINE
Payer: MEDICARE

## 2023-07-21 DIAGNOSIS — N18.5 CHRONIC KIDNEY DISEASE (CKD), STAGE V: Chronic | ICD-10-CM

## 2023-07-21 DIAGNOSIS — T82.9XXA COMPLICATION OF VASCULAR ACCESS FOR DIALYSIS, INITIAL ENCOUNTER: Primary | ICD-10-CM

## 2023-07-21 DIAGNOSIS — R07.9 CHEST PAIN: ICD-10-CM

## 2023-07-21 DIAGNOSIS — N18.9 CHRONIC KIDNEY DISEASE (CKD): ICD-10-CM

## 2023-07-21 DIAGNOSIS — T82.49XA CLOTTED DIALYSIS ACCESS: ICD-10-CM

## 2023-07-21 LAB
ALBUMIN SERPL BCP-MCNC: 3.4 G/DL (ref 3.5–5.2)
ALP SERPL-CCNC: 103 U/L (ref 55–135)
ALT SERPL W/O P-5'-P-CCNC: 11 U/L (ref 10–44)
ANION GAP SERPL CALC-SCNC: 12 MMOL/L (ref 8–16)
AST SERPL-CCNC: 14 U/L (ref 10–40)
BASOPHILS # BLD AUTO: 0.06 K/UL (ref 0–0.2)
BASOPHILS NFR BLD: 0.6 % (ref 0–1.9)
BILIRUB SERPL-MCNC: 0.4 MG/DL (ref 0.1–1)
BNP SERPL-MCNC: 181 PG/ML (ref 0–99)
BUN SERPL-MCNC: 44 MG/DL (ref 8–23)
CALCIUM SERPL-MCNC: 9.4 MG/DL (ref 8.7–10.5)
CHLORIDE SERPL-SCNC: 106 MMOL/L (ref 95–110)
CO2 SERPL-SCNC: 23 MMOL/L (ref 23–29)
CREAT SERPL-MCNC: 3.7 MG/DL (ref 0.5–1.4)
DIFFERENTIAL METHOD: ABNORMAL
EOSINOPHIL # BLD AUTO: 0.1 K/UL (ref 0–0.5)
EOSINOPHIL NFR BLD: 1.2 % (ref 0–8)
ERYTHROCYTE [DISTWIDTH] IN BLOOD BY AUTOMATED COUNT: 13.7 % (ref 11.5–14.5)
EST. GFR  (NO RACE VARIABLE): 13.3 ML/MIN/1.73 M^2
GLUCOSE SERPL-MCNC: 79 MG/DL (ref 70–110)
HCT VFR BLD AUTO: 32.8 % (ref 37–48.5)
HGB BLD-MCNC: 10.6 G/DL (ref 12–16)
IMM GRANULOCYTES # BLD AUTO: 0.06 K/UL (ref 0–0.04)
IMM GRANULOCYTES NFR BLD AUTO: 0.6 % (ref 0–0.5)
LYMPHOCYTES # BLD AUTO: 2.6 K/UL (ref 1–4.8)
LYMPHOCYTES NFR BLD: 25.3 % (ref 18–48)
MCH RBC QN AUTO: 28.1 PG (ref 27–31)
MCHC RBC AUTO-ENTMCNC: 32.3 G/DL (ref 32–36)
MCV RBC AUTO: 87 FL (ref 82–98)
MONOCYTES # BLD AUTO: 0.7 K/UL (ref 0.3–1)
MONOCYTES NFR BLD: 6.8 % (ref 4–15)
NEUTROPHILS # BLD AUTO: 6.8 K/UL (ref 1.8–7.7)
NEUTROPHILS NFR BLD: 65.5 % (ref 38–73)
NRBC BLD-RTO: 0 /100 WBC
PLATELET # BLD AUTO: 142 K/UL (ref 150–450)
PMV BLD AUTO: 10.7 FL (ref 9.2–12.9)
POTASSIUM SERPL-SCNC: 4.1 MMOL/L (ref 3.5–5.1)
PROT SERPL-MCNC: 7.4 G/DL (ref 6–8.4)
RBC # BLD AUTO: 3.77 M/UL (ref 4–5.4)
SODIUM SERPL-SCNC: 141 MMOL/L (ref 136–145)
TROPONIN I SERPL DL<=0.01 NG/ML-MCNC: 0.01 NG/ML (ref 0–0.03)
WBC # BLD AUTO: 10.41 K/UL (ref 3.9–12.7)

## 2023-07-21 PROCEDURE — 93010 ELECTROCARDIOGRAM REPORT: CPT | Mod: HCNC,NTX,, | Performed by: INTERNAL MEDICINE

## 2023-07-21 PROCEDURE — 84484 ASSAY OF TROPONIN QUANT: CPT | Mod: HCNC,NTX | Performed by: EMERGENCY MEDICINE

## 2023-07-21 PROCEDURE — 83880 ASSAY OF NATRIURETIC PEPTIDE: CPT | Mod: HCNC,NTX | Performed by: EMERGENCY MEDICINE

## 2023-07-21 PROCEDURE — 99285 EMERGENCY DEPT VISIT HI MDM: CPT | Mod: HCNC,NTX

## 2023-07-21 PROCEDURE — 85025 COMPLETE CBC W/AUTO DIFF WBC: CPT | Mod: HCNC,NTX | Performed by: EMERGENCY MEDICINE

## 2023-07-21 PROCEDURE — 25000003 PHARM REV CODE 250: Mod: HCNC,NTX | Performed by: PHYSICIAN ASSISTANT

## 2023-07-21 PROCEDURE — 93005 ELECTROCARDIOGRAM TRACING: CPT | Mod: HCNC,NTX

## 2023-07-21 PROCEDURE — 80053 COMPREHEN METABOLIC PANEL: CPT | Mod: HCNC,NTX | Performed by: EMERGENCY MEDICINE

## 2023-07-21 PROCEDURE — 93010 EKG 12-LEAD: ICD-10-PCS | Mod: HCNC,NTX,, | Performed by: INTERNAL MEDICINE

## 2023-07-21 PROCEDURE — G0378 HOSPITAL OBSERVATION PER HR: HCPCS | Mod: HCNC,NTX

## 2023-07-21 RX ORDER — SODIUM CHLORIDE 0.9 % (FLUSH) 0.9 %
10 SYRINGE (ML) INJECTION EVERY 12 HOURS PRN
Status: DISCONTINUED | OUTPATIENT
Start: 2023-07-22 | End: 2023-07-26 | Stop reason: HOSPADM

## 2023-07-21 RX ORDER — CARVEDILOL 25 MG/1
25 TABLET ORAL 2 TIMES DAILY WITH MEALS
Status: DISCONTINUED | OUTPATIENT
Start: 2023-07-22 | End: 2023-07-26 | Stop reason: HOSPADM

## 2023-07-21 RX ORDER — CALCIUM ACETATE 667 MG/1
667 CAPSULE ORAL
Status: DISCONTINUED | OUTPATIENT
Start: 2023-07-22 | End: 2023-07-26 | Stop reason: HOSPADM

## 2023-07-21 RX ORDER — FUROSEMIDE 80 MG/1
80 TABLET ORAL 2 TIMES DAILY
Status: DISCONTINUED | OUTPATIENT
Start: 2023-07-22 | End: 2023-07-26 | Stop reason: HOSPADM

## 2023-07-21 RX ORDER — ACETAMINOPHEN 325 MG/1
650 TABLET ORAL EVERY 4 HOURS PRN
Status: DISCONTINUED | OUTPATIENT
Start: 2023-07-22 | End: 2023-07-26 | Stop reason: HOSPADM

## 2023-07-21 RX ORDER — PARICALCITOL 1 UG/1
1 CAPSULE, LIQUID FILLED ORAL DAILY
Status: DISCONTINUED | OUTPATIENT
Start: 2023-07-22 | End: 2023-07-26 | Stop reason: HOSPADM

## 2023-07-21 RX ORDER — TALC
6 POWDER (GRAM) TOPICAL NIGHTLY PRN
Status: DISCONTINUED | OUTPATIENT
Start: 2023-07-22 | End: 2023-07-26 | Stop reason: HOSPADM

## 2023-07-21 RX ORDER — ONDANSETRON 2 MG/ML
4 INJECTION INTRAMUSCULAR; INTRAVENOUS EVERY 8 HOURS PRN
Status: DISCONTINUED | OUTPATIENT
Start: 2023-07-22 | End: 2023-07-26 | Stop reason: HOSPADM

## 2023-07-21 RX ORDER — HEPARIN SODIUM 5000 [USP'U]/ML
5000 INJECTION, SOLUTION INTRAVENOUS; SUBCUTANEOUS EVERY 8 HOURS
Status: DISCONTINUED | OUTPATIENT
Start: 2023-07-22 | End: 2023-07-26 | Stop reason: HOSPADM

## 2023-07-21 RX ORDER — NALOXONE HCL 0.4 MG/ML
0.02 VIAL (ML) INJECTION
Status: DISCONTINUED | OUTPATIENT
Start: 2023-07-22 | End: 2023-07-26 | Stop reason: HOSPADM

## 2023-07-21 RX ORDER — ACETAMINOPHEN 325 MG/1
650 TABLET ORAL
Status: COMPLETED | OUTPATIENT
Start: 2023-07-21 | End: 2023-07-21

## 2023-07-21 RX ORDER — SIMETHICONE 80 MG
1 TABLET,CHEWABLE ORAL 4 TIMES DAILY PRN
Status: DISCONTINUED | OUTPATIENT
Start: 2023-07-22 | End: 2023-07-26 | Stop reason: HOSPADM

## 2023-07-21 RX ORDER — GLUCAGON 1 MG
1 KIT INJECTION
Status: DISCONTINUED | OUTPATIENT
Start: 2023-07-22 | End: 2023-07-26 | Stop reason: HOSPADM

## 2023-07-21 RX ORDER — IBUPROFEN 200 MG
24 TABLET ORAL
Status: DISCONTINUED | OUTPATIENT
Start: 2023-07-22 | End: 2023-07-26 | Stop reason: HOSPADM

## 2023-07-21 RX ORDER — IBUPROFEN 200 MG
16 TABLET ORAL
Status: DISCONTINUED | OUTPATIENT
Start: 2023-07-22 | End: 2023-07-26 | Stop reason: HOSPADM

## 2023-07-21 RX ORDER — FERROUS GLUCONATE 324(37.5)
324 TABLET ORAL
Status: DISCONTINUED | OUTPATIENT
Start: 2023-07-22 | End: 2023-07-26 | Stop reason: HOSPADM

## 2023-07-21 RX ORDER — ONDANSETRON 4 MG/1
4 TABLET, ORALLY DISINTEGRATING ORAL
Status: COMPLETED | OUTPATIENT
Start: 2023-07-21 | End: 2023-07-21

## 2023-07-21 RX ORDER — INSULIN ASPART 100 [IU]/ML
0-5 INJECTION, SOLUTION INTRAVENOUS; SUBCUTANEOUS
Status: DISCONTINUED | OUTPATIENT
Start: 2023-07-22 | End: 2023-07-22

## 2023-07-21 RX ORDER — TRAZODONE HYDROCHLORIDE 50 MG/1
50 TABLET ORAL NIGHTLY PRN
Status: DISCONTINUED | OUTPATIENT
Start: 2023-07-22 | End: 2023-07-26 | Stop reason: HOSPADM

## 2023-07-21 RX ORDER — ESCITALOPRAM OXALATE 10 MG/1
10 TABLET ORAL DAILY
Status: DISCONTINUED | OUTPATIENT
Start: 2023-07-22 | End: 2023-07-26 | Stop reason: HOSPADM

## 2023-07-21 RX ORDER — CETIRIZINE HYDROCHLORIDE 5 MG/1
5 TABLET ORAL DAILY PRN
Status: DISCONTINUED | OUTPATIENT
Start: 2023-07-22 | End: 2023-07-26 | Stop reason: HOSPADM

## 2023-07-21 RX ORDER — ACETAMINOPHEN 500 MG
5000 TABLET ORAL DAILY
Status: DISCONTINUED | OUTPATIENT
Start: 2023-07-22 | End: 2023-07-26 | Stop reason: HOSPADM

## 2023-07-21 RX ADMIN — ONDANSETRON 4 MG: 4 TABLET, ORALLY DISINTEGRATING ORAL at 09:07

## 2023-07-21 RX ADMIN — ACETAMINOPHEN 650 MG: 325 TABLET ORAL at 09:07

## 2023-07-21 NOTE — Clinical Note
Diagnosis: Chronic kidney disease (CKD) [073610]   Future Attending Provider: KAVITA RAO [[id number]]   Admitting Provider:: KAVITA RAO [[id number]]

## 2023-07-21 NOTE — TELEPHONE ENCOUNTER
Spoke with Dr Calzada and pt informed per Dr Calzada to report to the ED and have labs ddrawn and he will proceed from there.Pt verbalized understanding of information received.

## 2023-07-21 NOTE — TELEPHONE ENCOUNTER
"Attempted to contact the Joy at the dialysis center but no answer and unable to leave a voice message.Spoke with the pt and the pt verbalized today was her first day of dialysis and was told "they were pulling clots and I would have to see vascular before coming back".Informed the pt that I will contact on call provider Dr Calzada and she will be contacted with directives.Pt verbalized understanding of ifnormation received.  ----- Message from Delmis Quezada sent at 7/21/2023  3:10 PM CDT -----  Regarding: Dialysis  Contact: Joy [phone]  Justin Blanco is calling stating pt came in for dialysis but wasn't able to receive it states they were pulling out clots of blood please call      "

## 2023-07-21 NOTE — TELEPHONE ENCOUNTER
JERMAINE contacted pt via phone to confirm pt is scheduled to begin outpt dialysis today at [facility]. Pt is aware of location and chair time. All questions addressed.    Anjelica Magaña LMSW  [facility]  [phone]

## 2023-07-22 PROBLEM — T82.49XA CLOTTED DIALYSIS ACCESS: Status: ACTIVE | Noted: 2023-07-22

## 2023-07-22 LAB
ANION GAP SERPL CALC-SCNC: 12 MMOL/L (ref 8–16)
BASOPHILS # BLD AUTO: 0.04 K/UL (ref 0–0.2)
BASOPHILS NFR BLD: 0.4 % (ref 0–1.9)
BUN SERPL-MCNC: 46 MG/DL (ref 8–23)
CALCIUM SERPL-MCNC: 9 MG/DL (ref 8.7–10.5)
CHLORIDE SERPL-SCNC: 109 MMOL/L (ref 95–110)
CO2 SERPL-SCNC: 22 MMOL/L (ref 23–29)
CREAT SERPL-MCNC: 3.5 MG/DL (ref 0.5–1.4)
DIFFERENTIAL METHOD: ABNORMAL
EOSINOPHIL # BLD AUTO: 0.2 K/UL (ref 0–0.5)
EOSINOPHIL NFR BLD: 1.8 % (ref 0–8)
ERYTHROCYTE [DISTWIDTH] IN BLOOD BY AUTOMATED COUNT: 13.9 % (ref 11.5–14.5)
EST. GFR  (NO RACE VARIABLE): 14.3 ML/MIN/1.73 M^2
GLUCOSE SERPL-MCNC: 98 MG/DL (ref 70–110)
HCT VFR BLD AUTO: 30.1 % (ref 37–48.5)
HGB BLD-MCNC: 9.6 G/DL (ref 12–16)
IMM GRANULOCYTES # BLD AUTO: 0.04 K/UL (ref 0–0.04)
IMM GRANULOCYTES NFR BLD AUTO: 0.4 % (ref 0–0.5)
LYMPHOCYTES # BLD AUTO: 2.8 K/UL (ref 1–4.8)
LYMPHOCYTES NFR BLD: 31 % (ref 18–48)
MAGNESIUM SERPL-MCNC: 1.9 MG/DL (ref 1.6–2.6)
MCH RBC QN AUTO: 27.7 PG (ref 27–31)
MCHC RBC AUTO-ENTMCNC: 31.9 G/DL (ref 32–36)
MCV RBC AUTO: 87 FL (ref 82–98)
MONOCYTES # BLD AUTO: 0.8 K/UL (ref 0.3–1)
MONOCYTES NFR BLD: 8.5 % (ref 4–15)
NEUTROPHILS # BLD AUTO: 5.2 K/UL (ref 1.8–7.7)
NEUTROPHILS NFR BLD: 57.9 % (ref 38–73)
NRBC BLD-RTO: 0 /100 WBC
PHOSPHATE SERPL-MCNC: 4.8 MG/DL (ref 2.7–4.5)
PLATELET # BLD AUTO: 125 K/UL (ref 150–450)
PMV BLD AUTO: 10.5 FL (ref 9.2–12.9)
POCT GLUCOSE: 113 MG/DL (ref 70–110)
POCT GLUCOSE: 81 MG/DL (ref 70–110)
POTASSIUM SERPL-SCNC: 4.1 MMOL/L (ref 3.5–5.1)
RBC # BLD AUTO: 3.47 M/UL (ref 4–5.4)
SODIUM SERPL-SCNC: 143 MMOL/L (ref 136–145)
WBC # BLD AUTO: 8.99 K/UL (ref 3.9–12.7)

## 2023-07-22 PROCEDURE — 25000003 PHARM REV CODE 250: Mod: HCNC,NTX | Performed by: HOSPITALIST

## 2023-07-22 PROCEDURE — 25000003 PHARM REV CODE 250: Mod: HCNC,NTX | Performed by: NURSE PRACTITIONER

## 2023-07-22 PROCEDURE — 84100 ASSAY OF PHOSPHORUS: CPT | Mod: HCNC,NTX | Performed by: HOSPITALIST

## 2023-07-22 PROCEDURE — 96372 THER/PROPH/DIAG INJ SC/IM: CPT | Mod: NTX | Performed by: HOSPITALIST

## 2023-07-22 PROCEDURE — G0378 HOSPITAL OBSERVATION PER HR: HCPCS | Mod: HCNC,NTX

## 2023-07-22 PROCEDURE — 96374 THER/PROPH/DIAG INJ IV PUSH: CPT | Mod: NTX

## 2023-07-22 PROCEDURE — 85025 COMPLETE CBC W/AUTO DIFF WBC: CPT | Mod: HCNC,NTX | Performed by: HOSPITALIST

## 2023-07-22 PROCEDURE — 99214 OFFICE O/P EST MOD 30 MIN: CPT | Mod: HCNC,NTX,, | Performed by: NURSE PRACTITIONER

## 2023-07-22 PROCEDURE — 83735 ASSAY OF MAGNESIUM: CPT | Mod: HCNC,NTX | Performed by: HOSPITALIST

## 2023-07-22 PROCEDURE — 99223 1ST HOSP IP/OBS HIGH 75: CPT | Mod: HCNC,NTX,, | Performed by: HOSPITALIST

## 2023-07-22 PROCEDURE — 99214 PR OFFICE/OUTPT VISIT, EST, LEVL IV, 30-39 MIN: ICD-10-PCS | Mod: HCNC,NTX,, | Performed by: NURSE PRACTITIONER

## 2023-07-22 PROCEDURE — 82962 GLUCOSE BLOOD TEST: CPT | Mod: HCNC,91,NTX

## 2023-07-22 PROCEDURE — 80048 BASIC METABOLIC PNL TOTAL CA: CPT | Mod: HCNC,NTX | Performed by: HOSPITALIST

## 2023-07-22 PROCEDURE — 63600175 PHARM REV CODE 636 W HCPCS: Mod: HCNC,NTX | Performed by: HOSPITALIST

## 2023-07-22 PROCEDURE — 99223 PR INITIAL HOSPITAL CARE,LEVL III: ICD-10-PCS | Mod: HCNC,NTX,, | Performed by: HOSPITALIST

## 2023-07-22 RX ORDER — HYDRALAZINE HYDROCHLORIDE 20 MG/ML
10 INJECTION INTRAMUSCULAR; INTRAVENOUS EVERY 8 HOURS PRN
Status: DISCONTINUED | OUTPATIENT
Start: 2023-07-22 | End: 2023-07-26 | Stop reason: HOSPADM

## 2023-07-22 RX ORDER — SODIUM CHLORIDE 9 MG/ML
INJECTION, SOLUTION INTRAVENOUS ONCE
Status: COMPLETED | OUTPATIENT
Start: 2023-07-22 | End: 2023-07-22

## 2023-07-22 RX ORDER — INSULIN ASPART 100 [IU]/ML
0-5 INJECTION, SOLUTION INTRAVENOUS; SUBCUTANEOUS EVERY 6 HOURS
Status: DISCONTINUED | OUTPATIENT
Start: 2023-07-22 | End: 2023-07-26 | Stop reason: HOSPADM

## 2023-07-22 RX ADMIN — CALCIUM ACETATE 667 MG: 667 CAPSULE ORAL at 11:07

## 2023-07-22 RX ADMIN — Medication 324 MG: at 08:07

## 2023-07-22 RX ADMIN — Medication 6 MG: at 10:07

## 2023-07-22 RX ADMIN — CARVEDILOL 25 MG: 25 TABLET, FILM COATED ORAL at 08:07

## 2023-07-22 RX ADMIN — CHOLECALCIFEROL TAB 125 MCG (5000 UNIT) 5000 UNITS: 125 TAB at 11:07

## 2023-07-22 RX ADMIN — HEPARIN SODIUM 5000 UNITS: 5000 INJECTION INTRAVENOUS; SUBCUTANEOUS at 02:07

## 2023-07-22 RX ADMIN — FUROSEMIDE 80 MG: 80 TABLET ORAL at 09:07

## 2023-07-22 RX ADMIN — CARVEDILOL 25 MG: 25 TABLET, FILM COATED ORAL at 05:07

## 2023-07-22 RX ADMIN — CALCIUM ACETATE 667 MG: 667 CAPSULE ORAL at 05:07

## 2023-07-22 RX ADMIN — SODIUM CHLORIDE: 9 INJECTION, SOLUTION INTRAVENOUS at 05:07

## 2023-07-22 RX ADMIN — FUROSEMIDE 80 MG: 80 TABLET ORAL at 12:07

## 2023-07-22 RX ADMIN — ACETAMINOPHEN 650 MG: 325 TABLET ORAL at 09:07

## 2023-07-22 RX ADMIN — TRAZODONE HYDROCHLORIDE 50 MG: 50 TABLET ORAL at 10:07

## 2023-07-22 RX ADMIN — ONDANSETRON 4 MG: 2 INJECTION INTRAMUSCULAR; INTRAVENOUS at 10:07

## 2023-07-22 RX ADMIN — PARICALCITOL 1 MCG: 1 CAPSULE, LIQUID FILLED ORAL at 11:07

## 2023-07-22 RX ADMIN — HEPARIN SODIUM 5000 UNITS: 5000 INJECTION INTRAVENOUS; SUBCUTANEOUS at 10:07

## 2023-07-22 RX ADMIN — ESCITALOPRAM OXALATE 10 MG: 5 TABLET, FILM COATED ORAL at 08:07

## 2023-07-22 RX ADMIN — HEPARIN SODIUM 5000 UNITS: 5000 INJECTION INTRAVENOUS; SUBCUTANEOUS at 06:07

## 2023-07-22 RX ADMIN — FUROSEMIDE 80 MG: 80 TABLET ORAL at 08:07

## 2023-07-22 RX ADMIN — CALCIUM ACETATE 667 MG: 667 CAPSULE ORAL at 08:07

## 2023-07-22 NOTE — H&P
[facility] - Emergency Dept  [facility] Medicine  History & Physical    Patient Name: Kathie Quezada  MRN: [id number]  Patient Class: OP- Observation  Admission Date: 7/21/2023  Attending Physician: Elan Alarcon MD   Primary Care Provider: Elan Price MD         Patient information was obtained from patient and ER records.     Subjective:     Principal Problem:Clotted dialysis access    Chief Complaint:   Chief Complaint   Patient presents with    Vascular Access Problem     Not able to have dialysis today        HPI: Kathie Quezada is a 63-year-old female with a  pmhx of CKD5, CHF, HTN, T2DM, Factor XI deficiency who presented to ED for evaluation of vascular access problem. She had dialysis AVF placed on left arm in March by Dr. Tavera in anticipation of HD.  She went to [facility]  earlier today to start dialysis for the first time.  Dialysis technician was unable to access fistula due to blood clots.  Fistulas located in her left upper arm.  She denies pain, redness or swelling at fistula site. Dialysis technician contacted Dr. Tavera's nurse who advised patient to come to ER for further evaluation and vascular surgery consultation. Patient overall feels tired with intermittent headache and nausea. Denies fever, chills, cough, sob, chest pain, leg swelling, weight gain, vomiting, diarrhea, abdominal pain, dysuria, hematuria, focal weakness or numbness. Patient still makes good bit of urine daily and takes fluid pills for leg swelling.     ED Course: afebrile and vitals stable. K 4.1, Bicarb 23, Bun 44,  Cr 3.7, , Troponin negative. Patient received tylenol and zofran in ED. She is conversant and offers no complaints during my interview. She reports improvement of headache and nausea with tylenol and zofran.          Past Medical History:   Diagnosis Date    Acute respiratory failure with hypoxia and hypercapnia 05/26/2019    ATN (acute tubular necrosis) 02/08/2019    Chronic diastolic heart failure  02/07/2019 2-8-19 Mild left atrial enlargement. Mild left ventricular enlargement. Normal left ventricular systolic function. The estimated ejection fraction is 55% Indeterminate left ventricular diastolic function. Normal right ventricular systolic function. Mild mitral regurgitation. Mild to moderate tricuspid regurgitation. The estimated PA systolic pressure is 32 mm Hg Normal central venous pressure (3 m    CKD (chronic kidney disease) stage 4, GFR 15-29 ml/min 05/16/2018    CKD stage G3a/A3, GFR 45-59 and albumin creatinine ratio >300 mg/g 05/16/2018    CKD stage G3b/A3, GFR 30-44 and albumin creatinine ratio >300 mg/g 05/16/2018    Closed compression fracture of L2 lumbar vertebra 05/24/2019    Closed compression fracture of L2 lumbar vertebra 05/24/2019     IMO Regulatory Load October 2019    Controlled type 2 diabetes with retinopathy 04/09/2018    COVID-19 virus infection 7-1-2021 07/01/2021    CVA (cerebral vascular accident) 2017    Diabetic polyneuropathy associated with type 2 diabetes mellitus 12/04/2020    Encounter for blood transfusion     Essential hypertension 04/09/2018    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  7-31-19 Factor XI Activity 55 - 145 % 96       Gastroesophageal reflux disease without esophagitis 05/24/2019    Generalized anxiety disorder 11/08/2019    GERD (gastroesophageal reflux disease)     GIB (gastrointestinal bleeding) 10/18/2018    History of CVA (cerebrovascular accident) without residual deficits 11/27/2022    History of hepatitis C     s/p succesful Rx w/ Harvoni  - SVR12 (cure) 2016    Hypertensive retinopathy, bilateral 12/10/2019    Mild nonproliferative diabetic retinopathy 05/02/2018    Mixed hyperlipidemia 04/09/2018    Myalgia due to statin 09/07/2022    Normocytic anemia 01/14/2022    NS (nuclear sclerosis), bilateral 12/10/2019    PNA (pneumonia) 05/28/2019    Postoperative hypothyroidism     Renovascular  hypertension 04/09/2018    S/P kyphoplasty 08/14/2019 8/14/19: L2 kyphoplasty with Dr. Kwon     Slow transit constipation 05/27/2019    Type 2 diabetes mellitus with circulatory disorder, with long-term current use of insulin 04/09/2018    Type 2 diabetes mellitus with stage 3 chronic kidney disease, without long-term current use of insulin 04/09/2018    Type 2 diabetes mellitus with stage 3a chronic kidney disease, without long-term current use of insulin 03/03/2021    Type 2 diabetes mellitus with stage 3b chronic kidney disease, without long-term current use of insulin 09/07/2022       Past Surgical History:   Procedure Laterality Date    AV FISTULA PLACEMENT Left 10/27/2022    Procedure: CREATION, AV FISTULA;  Surgeon: Tirso Tavera MD;  Location: [facility] OR [address] [place] [address];  Service: Peripheral Vascular;  Laterality: Left;  Brachio basilic 1st stage     BACK SURGERY  2017    CHOLECYSTECTOMY  2015    [facility]    COLONOSCOPY  11/13/2015    Dr Chino torres prep. internal hemorrhoids. diverticulosis of sigmois. NO polyps. repeat due 2025    FISTULOGRAM N/A 12/16/2022    Procedure: Fistulogram;  Surgeon: Tirso Tavera MD;  Location: [facility] CATH LAB;  Service: Peripheral Vascular;  Laterality: N/A;    FISTULOGRAM, WITH PTA Left 3/23/2023    Procedure: FISTULOGRAM, WITH PTA;  Surgeon: Tirso Tavera MD;  [facility];  Service: Peripheral Vascular;  Laterality: Left;  LUE TRANSRADIAL CO2 FISTULOGRAM    1.9 min  22.99 mGy  4.3032 Gy.cm  200cc CO2  9ml TransRadial Solution    FIXATION KYPHOPLASTY Bilateral 08/14/2019    Procedure: L2 kyphoplasty Fluoro Globus;  Surgeon: Jeremie Kwon DO;  Location: [facility] OR;  Service: Neurosurgery;  Laterality: Bilateral;  GLOBUS MACRINA WINSLOW [phone] TEXTED HER @ 10:54AM ON 7-30-19  PRE-OP-BY RN  8-7-2019    HYSTERECTOMY      OOPHORECTOMY  1996    one    PERCUTANEOUS TRANSLUMINAL ANGIOPLASTY OF ARTERIOVENOUS FISTULA Left 12/16/2022    Procedure: PTA, AV  FISTULA;  Surgeon: Tirso Tavera MD;  Location: [facility] CATH LAB;  Service: Peripheral Vascular;  Laterality: Left;    RIGHT HEART CATHETERIZATION Right 6/15/2023    Procedure: INSERTION, CATHETER, RIGHT HEART;  Surgeon: Lucretia Rivas MD;  Location: [facility] CATH LAB;  Service: Cardiology;  Laterality: Right;    THYROIDECTOMY  2016    at [facility] for [facility] nodule    TONSILLECTOMY      TRANSPOSITION OF BASILIC VEIN Left 1/31/2023    Procedure: TRANSPOSITION, VEIN, BASILIC;  Surgeon: Tirso Tavera MD;  Location: [facility] OR [facility] FLR;  Service: Peripheral Vascular;  Laterality: Left;       Review of patient's allergies indicates:   Allergen Reactions    Atorvastatin Other (See Comments)     Statin induced myalgia    Mushroom Hives    Peanut Anaphylaxis    Bactrim [sulfamethoxazole-trimethoprim] Hives    Gabapentin      Pruritis     Iodine     Peanut butter flavor     Penicillins Hives    Shellfish containing products Itching    Sulfa (sulfonamide antibiotics) Hives       No current facility-administered medications on file prior to encounter.     Current Outpatient Medications on File Prior to Encounter   Medication Sig    calcium acetate,phosphat bind, (PHOSLO) 667 mg capsule Take 1 capsule (667 mg total) by mouth 3 (three) times daily with meals.    carvediloL (COREG) 25 MG tablet Take 1 tablet (25 mg total) by mouth 2 (two) times daily with meals.    cholecalciferol, vitamin D3, 125 mcg (5,000 unit) Tab Take 1 tablet (5,000 Units total) by mouth once daily.    EScitalopram oxalate (LEXAPRO) 10 MG tablet Take 1 tablet (10 mg total) by mouth once daily.    ferrous gluconate (FERGON) 324 MG tablet Take 1 tablet (324 mg total) by mouth daily with breakfast.    furosemide (LASIX) 40 MG tablet Take 2 tablets (80 mg total) by mouth 2 (two) times a day.    levocetirizine (XYZAL) 5 MG tablet Take 1 tablet (5 mg total) by mouth daily as needed for Allergies.    miconazole (MICOTIN) 2 % cream Apply topically 2  (two) times daily to Bilateral feet    paricalcitoL (ZEMPLAR) 1 MCG capsule Take 1 capsule (1 mcg total) by mouth once daily.    SYNTHROID 200 mcg tablet Take 1 tablet (200 mcg total) by mouth before breakfast.    traZODone (DESYREL) 50 MG tablet Take 1 to 2 tablets ( mg total) by mouth nightly as needed for Insomnia.     Family History       Problem Relation (Age of Onset)    Aneurysm Brother    Coronary artery disease Mother    Diabetes type II Mother    Heart disease Mother    Hypertension Mother, Father    Prostate cancer Father    Stroke Mother          Tobacco Use    Smoking status: Never    Smokeless tobacco: Never   Substance and Sexual Activity    Alcohol use: Not Currently    Drug use: No    Sexual activity: Not Currently     Review of Systems   Constitutional:  Positive for fatigue. Negative for activity change, appetite change, chills, diaphoresis and fever.   HENT:  Negative for congestion, dental problem, drooling, ear discharge, ear pain, facial swelling, hearing loss, mouth sores, nosebleeds, postnasal drip, rhinorrhea, sinus pressure, sneezing, sore throat, tinnitus, trouble swallowing and voice change.    Eyes:  Negative for photophobia, pain, discharge, redness, itching and visual disturbance.   Respiratory:  Negative for apnea, cough, choking, chest tightness, shortness of breath, wheezing and stridor.    Cardiovascular:  Negative for chest pain, palpitations and leg swelling.        Clotted AVF on left arm    Gastrointestinal:  Positive for nausea. Negative for abdominal distention, abdominal pain, anal bleeding, blood in stool, constipation, diarrhea, rectal pain and vomiting.   Endocrine: Negative for cold intolerance, heat intolerance, polydipsia, polyphagia and polyuria.   Genitourinary:  Negative for decreased urine volume, difficulty urinating, dyspareunia, dysuria, enuresis, flank pain, frequency, genital sores, hematuria, menstrual problem, pelvic pain, urgency, vaginal  bleeding, vaginal discharge and vaginal pain.   Musculoskeletal:  Negative for arthralgias, back pain, gait problem, joint swelling, myalgias, neck pain and neck stiffness.   Skin:  Negative for color change, pallor, rash and wound.   Allergic/Immunologic: Negative for environmental allergies, food allergies and immunocompromised state.   Neurological:  Positive for headaches. Negative for dizziness, tremors, seizures, syncope, facial asymmetry, speech difficulty, weakness, light-headedness and numbness.   Hematological:  Negative for adenopathy. Does not bruise/bleed easily.   Psychiatric/Behavioral:  Negative for agitation, behavioral problems, confusion, decreased concentration, dysphoric mood, hallucinations, self-injury, sleep disturbance and suicidal ideas. The patient is not nervous/anxious and is not hyperactive.    Objective:     Vital Signs (Most Recent):  Temp: 98.4 °F (36.9 °C) (07/21/23 2309)  Pulse: 74 (07/21/23 2309)  Resp: 16 (07/21/23 2309)  BP: 136/86 (07/21/23 2309)  SpO2: 97 % (07/21/23 2309) Vital Signs (24h Range):  Temp:  [98.3 °F (36.8 °C)-98.5 °F (36.9 °C)] 98.4 °F (36.9 °C)  Pulse:  [70-75] 74  Resp:  [16-18] 16  SpO2:  [97 %-98 %] 97 %  BP: (136-193)/(85-95) 136/86     Weight: 107 kg (235 lb 14.3 oz)  Body mass index is 33.85 kg/m².     Physical Exam  Constitutional:       General: She is not in acute distress.     Appearance: She is well-developed. She is obese. She is not diaphoretic.   HENT:      Head: Normocephalic and atraumatic.      Right Ear: External ear normal.      Left Ear: External ear normal.      Nose: Nose normal.      Mouth/Throat:      Pharynx: No oropharyngeal exudate.   Eyes:      General: No scleral icterus.     Conjunctiva/sclera: Conjunctivae normal.      Pupils: Pupils are equal, round, and reactive to light.   Neck:      Thyroid: No thyromegaly.      Vascular: No JVD.      Trachea: No tracheal deviation.   Cardiovascular:      Rate and Rhythm: Normal rate and  regular rhythm.      Heart sounds: Normal heart sounds. No murmur heard.    No gallop.      Comments: Positive thrills over left arm AVF above elbow. Site C/D/I.   Pulmonary:      Effort: Pulmonary effort is normal. No respiratory distress.      Breath sounds: Normal breath sounds. No wheezing or rales.   Chest:      Chest wall: No tenderness.   Abdominal:      General: Bowel sounds are normal. There is no distension.      Palpations: Abdomen is soft. There is no mass.      Tenderness: There is no abdominal tenderness. There is no guarding or rebound.   Genitourinary:     Vagina: No vaginal discharge.   Musculoskeletal:         General: No tenderness.      Cervical back: Neck supple.   Lymphadenopathy:      Cervical: No cervical adenopathy.   Skin:     General: Skin is warm and dry.      Coloration: Skin is not pale.      Findings: No erythema or rash.   Neurological:      Mental Status: She is alert and oriented to person, place, and time.      Cranial Nerves: No cranial nerve deficit.      Motor: No abnormal muscle tone.      Coordination: Coordination normal.      Deep Tendon Reflexes: Reflexes are normal and symmetric. Reflexes normal.      Comments: Mild tremors of upper extremities    Psychiatric:         Behavior: Behavior normal.         Thought Content: Thought content normal.         Judgment: Judgment normal.      Comments: Flat affect             CRANIAL NERVES     CN III, IV, VI   Pupils are equal, round, and reactive to light.     Significant Labs: All pertinent labs within the past 24 hours have been reviewed.  Recent Lab Results         07/21/23  2104        Albumin 3.4       Alkaline Phosphatase 103       ALT 11       Anion Gap 12       AST 14       Baso # 0.06       Basophil % 0.6       BILIRUBIN TOTAL 0.4  Comment: For infants and newborns, interpretation of results should be based  on gestational age, weight and in agreement with clinical  observations.    Premature Infant recommended reference  ranges:  Up to 24 hours.............<8.0 mg/dL  Up to 48 hours............<12.0 mg/dL  3-5 days..................<15.0 mg/dL  6-29 days.................<15.0 mg/dL           Comment: Values of less than 100 pg/ml are consistent with non-CHF populations.       BUN 44       Calcium 9.4       Chloride 106       CO2 23       Creatinine 3.7       Differential Method Automated       eGFR 13.3       Eos # 0.1       Eosinophil % 1.2       Glucose 79       Gran # (ANC) 6.8       Gran % 65.5       Hematocrit 32.8       Hemoglobin 10.6       Immature Grans (Abs) 0.06  Comment: Mild elevation in immature granulocytes is non specific and   can be seen in a variety of conditions including stress response,   acute inflammation, trauma and pregnancy. Correlation with other   laboratory and clinical findings is essential.         Immature Granulocytes 0.6       Lymph # 2.6       Lymph % 25.3       MCH 28.1       MCHC 32.3       MCV 87       Mono # 0.7       Mono % 6.8       MPV 10.7       nRBC 0       Platelets 142       Potassium 4.1       PROTEIN TOTAL 7.4       RBC 3.77       RDW 13.7       Sodium 141       Troponin I 0.011  Comment: The reference interval for Troponin I represents the 99th percentile   cutoff   for our facility and is consistent with 3rd generation assay   performance.         WBC 10.41               Significant Imaging: I have reviewed all pertinent imaging results/findings within the past 24 hours.    Assessment/Plan:     * Clotted dialysis access  -clotted AVF on left arm which was placed in March by Dr. Tavera in anticipation of HD  -site C/D/I and neurovascular intact left upper extremity   -vascular surgery consulted by ED pending evaluation  -npo for now pending vascular evaluation        ESRD (end stage renal disease)  -due to long standing HTN and DM2  -still makes good bit of urine daily   -patient went to [facility] earlier today for first session of HD but technician was unable to access AVF due to  clot   -no indication for emergent dialysis as K, bicarb and volume status reasonable   -continue home phosphate binders, vitamin D and diuretics         Renovascular hypertension  -chronic and controlled   -continue home carvedilol       Chronic diastolic heart failure  -recent echo in February showed diastolic dysfunction and Pulmonary hypertension with PASP 58  -appears euvolemic without signs of ADHF   -continue home lasix bid   -strict I/Os       Type 2 diabetes mellitus with stage 5 chronic kidney disease not on chronic dialysis, without long-term current use of insulin  Patient's FSGs are controlled on current medication regimen.  Last A1c reviewed-   Lab Results   Component Value Date    HGBA1C 5.2 02/02/2023     Most recent fingerstick glucose reviewed-   Recent Labs   Lab 07/22/23  0009   POCTGLUCOSE 81     Current correctional scale  Low  Maintain anti-hyperglycemic dose as follows-   Antihyperglycemics (From admission, onward)    Start     Stop Route Frequency Ordered    07/22/23 0054  insulin aspart U-100 pen 0-5 Units         -- SubQ Before meals & nightly PRN 07/21/23 2355        Hold Oral hypoglycemics while patient is in the hospital.    Pulmonary hypertension  -euvolemic w/o signs of acute flare   -continue home diuretics       Metabolic bone disease  -continue vitamin D and phosphate binder      Generalized anxiety disorder  -stable and continue home lexapro       VTE Risk Mitigation (From admission, onward)         Ordered     heparin (porcine) injection 5,000 Units  Every 8 hours         07/21/23 2355     IP VTE HIGH RISK PATIENT  Once         07/21/23 2355     Place sequential compression device  Until discontinued         07/21/23 2355                     Maite Leija DO  Department of Hospital Medicine  [facility] - Emergency Dept

## 2023-07-22 NOTE — ASSESSMENT & PLAN NOTE
63-year-old woman with past medical history significant for end-stage renal disease, previous basilic vein transposition left arm.  Patient went to dialysis and was attempted to be accessed for the 1st time, however it was noted to be pulling clots and dialysis nurse stated that the fistula appeared to be deep.     Dialysis access is not thrombosed  Possible there is recurrent stenosis present  Recommend attempting dialysis access here in the hospital  Ultrasound of the fistula to determine depth and whether or not a superficialization is necessary  No indication for acute intervention at this time

## 2023-07-22 NOTE — SUBJECTIVE & OBJECTIVE
Past Medical History:   Diagnosis Date    Acute respiratory failure with hypoxia and hypercapnia 05/26/2019    ATN (acute tubular necrosis) 02/08/2019    Chronic diastolic heart failure 02/07/2019    2-8-19 Mild left atrial enlargement. Mild left ventricular enlargement. Normal left ventricular systolic function. The estimated ejection fraction is 55% Indeterminate left ventricular diastolic function. Normal right ventricular systolic function. Mild mitral regurgitation. Mild to moderate tricuspid regurgitation. The estimated PA systolic pressure is 32 mm Hg Normal central venous pressure (3 m    CKD (chronic kidney disease) stage 4, GFR 15-29 ml/min 05/16/2018    CKD stage G3a/A3, GFR 45-59 and albumin creatinine ratio >300 mg/g 05/16/2018    CKD stage G3b/A3, GFR 30-44 and albumin creatinine ratio >300 mg/g 05/16/2018    Closed compression fracture of L2 lumbar vertebra 05/24/2019    Closed compression fracture of L2 lumbar vertebra 05/24/2019     IMO Regulatory Load October 2019    Controlled type 2 diabetes with retinopathy 04/09/2018    COVID-19 virus infection 7-1-2021 07/01/2021    CVA (cerebral vascular accident) 2017    Diabetic polyneuropathy associated with type 2 diabetes mellitus 12/04/2020    Encounter for blood transfusion     Essential hypertension 04/09/2018    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  7-31-19 Factor XI Activity 55 - 145 % 96       Gastroesophageal reflux disease without esophagitis 05/24/2019    Generalized anxiety disorder 11/08/2019    GERD (gastroesophageal reflux disease)     GIB (gastrointestinal bleeding) 10/18/2018    History of CVA (cerebrovascular accident) without residual deficits 11/27/2022    History of hepatitis C     s/p succesful Rx w/ [facility]  - SVR12 (cure) 2016    Hypertensive retinopathy, bilateral 12/10/2019    Mild nonproliferative diabetic retinopathy 05/02/2018    Mixed hyperlipidemia 04/09/2018    Myalgia due to statin 09/07/2022     Normocytic anemia 01/14/2022    NS (nuclear sclerosis), bilateral 12/10/2019    PNA (pneumonia) 05/28/2019    Postoperative hypothyroidism     Renovascular hypertension 04/09/2018    S/P kyphoplasty 08/14/2019 8/14/19: L2 kyphoplasty with Dr. Kwon     Slow transit constipation 05/27/2019    Type 2 diabetes mellitus with circulatory disorder, with long-term current use of insulin 04/09/2018    Type 2 diabetes mellitus with stage 3 chronic kidney disease, without long-term current use of insulin 04/09/2018    Type 2 diabetes mellitus with stage 3a chronic kidney disease, without long-term current use of insulin 03/03/2021    Type 2 diabetes mellitus with stage 3b chronic kidney disease, without long-term current use of insulin 09/07/2022       Past Surgical History:   Procedure Laterality Date    AV FISTULA PLACEMENT Left 10/27/2022    Procedure: CREATION, AV FISTULA;  Surgeon: Tirso Tavera MD;  Location: [facility] OR [address] [place] [address];  Service: Peripheral Vascular;  Laterality: Left;  Brachio basilic 1st stage     BACK SURGERY  2017    CHOLECYSTECTOMY  2015    [facility]    COLONOSCOPY  11/13/2015    Dr Chino torres prep. internal hemorrhoids. diverticulosis of sigmois. NO polyps. repeat due 2025    FISTULOGRAM N/A 12/16/2022    Procedure: Fistulogram;  Surgeon: Tirso Tavera MD;  Location: [facility] CATH LAB;  Service: Peripheral Vascular;  Laterality: N/A;    FISTULOGRAM, WITH PTA Left 3/23/2023    Procedure: FISTULOGRAM, WITH PTA;  Surgeon: Tirso Tavera MD;  [facility];  Service: Peripheral Vascular;  Laterality: Left;  LUE TRANSRADIAL CO2 FISTULOGRAM    1.9 min  22.99 mGy  4.3032 Gy.cm  200cc CO2  9ml TransRadial Solution    FIXATION KYPHOPLASTY Bilateral 08/14/2019    Procedure: L2 kyphoplasty Fluoro Globus;  Surgeon: Jeremie Kwon DO;  Location: [facility] OR;  Service: Neurosurgery;  Laterality: Bilateral;  EMMYUS MACRINA WINSLOW [phone] TEXTED HER @ 10:54AM ON 7-30-19  PRE-OP-BY RN  8-7-2019     HYSTERECTOMY      OOPHORECTOMY  1996    one    PERCUTANEOUS TRANSLUMINAL ANGIOPLASTY OF ARTERIOVENOUS FISTULA Left 12/16/2022    Procedure: PTA, AV FISTULA;  Surgeon: Tirso Tavera MD;  Location: [facility] CATH LAB;  Service: Peripheral Vascular;  Laterality: Left;    RIGHT HEART CATHETERIZATION Right 6/15/2023    Procedure: INSERTION, CATHETER, RIGHT HEART;  Surgeon: Lucretia Rivas MD;  Location: [facility] CATH LAB;  Service: Cardiology;  Laterality: Right;    THYROIDECTOMY  2016    at [facility] for thyroid nodule    TONSILLECTOMY      TRANSPOSITION OF BASILIC VEIN Left 1/31/2023    Procedure: TRANSPOSITION, VEIN, BASILIC;  Surgeon: Tirso Tavera MD;  Location: [facility] OR [address] [place] [address];  Service: Peripheral Vascular;  Laterality: Left;       Review of patient's allergies indicates:   Allergen Reactions    Atorvastatin Other (See Comments)     Statin induced myalgia    Mushroom Hives    Peanut Anaphylaxis    Bactrim [sulfamethoxazole-trimethoprim] Hives    Gabapentin      Pruritis     Iodine     Peanut butter flavor     Penicillins Hives    Shellfish containing products Itching    Sulfa (sulfonamide antibiotics) Hives     Current Facility-Administered Medications   Medication Frequency    0.9%  NaCl infusion Once    acetaminophen tablet 650 mg Q4H PRN    calcium acetate(phosphat bind) capsule 667 mg TID WM    carvediloL tablet 25 mg BID WM    cetirizine tablet 5 mg Daily PRN    cholecalciferol (vitamin D3) 125 mcg (5,000 unit) tablet 5,000 Units Daily    EScitalopram oxalate tablet 10 mg Daily    ferrous gluconate tablet 324 mg Daily with breakfast    furosemide tablet 80 mg BID    glucagon (human recombinant) injection 1 mg PRN    glucose chewable tablet 16 g PRN    glucose chewable tablet 24 g PRN    heparin (porcine) injection 5,000 Units Q8H    insulin aspart U-100 pen 0-5 Units Q6H    melatonin tablet 6 mg Nightly PRN    naloxone 0.4 mg/mL injection 0.02 mg PRN    ondansetron injection 4 mg Q8H PRN    paricalcitoL capsule 1  mcg Daily    simethicone chewable tablet 80 mg QID PRN    sodium chloride 0.9% flush 10 mL Q12H PRN    traZODone tablet 50 mg Nightly PRN     Current Outpatient Medications   Medication    calcium acetate,phosphat bind, (PHOSLO) 667 mg capsule    carvediloL (COREG) 25 MG tablet    cholecalciferol, vitamin D3, 125 mcg (5,000 unit) Tab    EScitalopram oxalate (LEXAPRO) 10 MG tablet    ferrous gluconate (FERGON) 324 MG tablet    furosemide (LASIX) 40 MG tablet    levocetirizine (XYZAL) 5 MG tablet    SYNTHROID 200 mcg tablet    traZODone (DESYREL) 50 MG tablet    paricalcitoL (ZEMPLAR) 1 MCG capsule     Family History       Problem Relation (Age of Onset)    Aneurysm Brother    Coronary artery disease Mother    Diabetes type II Mother    Heart disease Mother    Hypertension Mother, Father    Prostate cancer Father    Stroke Mother          Tobacco Use    Smoking status: Never    Smokeless tobacco: Never   Substance and Sexual Activity    Alcohol use: Not Currently    Drug use: No    Sexual activity: Not Currently     Review of Systems   Constitutional:  Negative for chills, fatigue and fever.   HENT:  Negative for hearing loss.    Eyes:  Negative for visual disturbance.   Respiratory:  Negative for chest tightness and shortness of breath.    Gastrointestinal:  Negative for nausea and vomiting.   Genitourinary:  Negative for decreased urine volume.   Musculoskeletal:  Negative for gait problem.   Skin:  Negative for color change.   Neurological:  Negative for weakness and numbness.   Psychiatric/Behavioral:  Negative for agitation.    Objective:     Vital Signs (Most Recent):  Temp: 98.4 °F (36.9 °C) (07/22/23 0420)  Pulse: 68 (07/22/23 1400)  Resp: 12 (07/22/23 0420)  BP: (!) 151/91 (07/22/23 1400)  SpO2: 99 % (07/22/23 1400) Vital Signs (24h Range):  Temp:  [98.3 °F (36.8 °C)-98.5 °F (36.9 °C)] 98.4 °F (36.9 °C)  Pulse:  [64-75] 68  Resp:  [12-18] 12  SpO2:  [97 %-99 %] 99 %  BP: (136-193)/(78-95) 151/91     Weight:  107 kg (235 lb 14.3 oz) (07/21/23 1820)  Body mass index is 33.85 kg/m².  Body surface area is 2.3 meters squared.    No intake/output data recorded.     Physical Exam  Vitals and nursing note reviewed.   Constitutional:       Appearance: Normal appearance.   HENT:      Head: Normocephalic and atraumatic.      Mouth/Throat:      Mouth: Mucous membranes are moist.   Eyes:      General: No scleral icterus.  Cardiovascular:      Rate and Rhythm: Normal rate.      Arteriovenous access: Left arteriovenous access is present.     Comments: +thrill/bruit to LUE AVF  Pulmonary:      Effort: Pulmonary effort is normal.   Abdominal:      General: Abdomen is flat.      Palpations: Abdomen is soft.   Skin:     General: Skin is warm and dry.      Capillary Refill: Capillary refill takes less than 2 seconds.   Neurological:      General: No focal deficit present.      Mental Status: She is alert and oriented to person, place, and time.   Psychiatric:         Mood and Affect: Mood normal.         Behavior: Behavior normal.        Significant Labs:  CBC:   Recent Labs   Lab 07/22/23  0422   WBC 8.99   RBC 3.47*   HGB 9.6*   HCT 30.1*   *   MCV 87   MCH 27.7   MCHC 31.9*     CMP:   Recent Labs   Lab 07/21/23  2104 07/22/23  0422   GLU 79 98   CALCIUM 9.4 9.0   ALBUMIN 3.4*  --    PROT 7.4  --     143   K 4.1 4.1   CO2 23 22*    109   BUN 44* 46*   CREATININE 3.7* 3.5*   ALKPHOS 103  --    ALT 11  --    AST 14  --    BILITOT 0.4  --      All labs within the past 24 hours have been reviewed.

## 2023-07-22 NOTE — HPI
"The patient is a 63 y.o. Black or  Female with multiple co morbidities including CKD V on HD, CHF, HTN, T2DM, and Factor XI deficiency who presents to ED on 7/21/2023 with complaints of  vascular access problem. Of note, LUE AVF was placed by Dr. Tavera in anticipation of HD with recent fistulogram and PTA on 3/23/23. Patient went to dialysis on 7/21 to initiate HD via access, however, it was noted to be "deep" and pulling clots per patient. She was sent to the ED for evaluation. Vascular was consulted and does not think the access is thrombosed and recommends attempting HD inpatient. She currently denies shortness of breath or other symptoms of fluid overload or uremia.  Labs in the emergency department consistent with ESRD and non emergent. + residual renal function. Admitted for HD and further evaluation by vascular. Fistula US ordered. Nephrology consulted for management of ESRD and HD treatment.  "

## 2023-07-22 NOTE — ASSESSMENT & PLAN NOTE
Patient's FSGs are controlled on current medication regimen.  Last A1c reviewed-   Lab Results   Component Value Date    HGBA1C 5.2 02/02/2023     Most recent fingerstick glucose reviewed-   Recent Labs   Lab 07/22/23  0009   POCTGLUCOSE 81     Current correctional scale  Low  Maintain anti-hyperglycemic dose as follows-   Antihyperglycemics (From admission, onward)    Start     Stop Route Frequency Ordered    07/22/23 0054  insulin aspart U-100 pen 0-5 Units         -- SubQ Before meals & nightly PRN 07/21/23 2355        Hold Oral hypoglycemics while patient is in the hospital.

## 2023-07-22 NOTE — ASSESSMENT & PLAN NOTE
Admitted with concerns for clotted access. Vascular recommending HD inpatient and vascular US. AVF +/+ on exam.     - Will attempt HD while inpatient.

## 2023-07-22 NOTE — ASSESSMENT & PLAN NOTE
-due to long standing HTN and DM2  -still makes good bit of urine daily   -patient went to [facility] earlier today for first session of HD but technician was unable to access AVF due to clot   -no indication for emergent dialysis as K, bicarb and volume status reasonable   -continue home phosphate binders, vitamin D and diuretics

## 2023-07-22 NOTE — ASSESSMENT & PLAN NOTE
-recent echo in February showed diastolic dysfunction and Pulmonary hypertension with PASP 58  -appears euvolemic without signs of ADHF   -continue home lasix bid   -strict I/Os

## 2023-07-22 NOTE — ED NOTES
"Kathie Quezada, a 63 y.o. female presents to the ED w/ complaint of Vascular Access Problem   Did not have first dialysis tx today because  "my fistula access is clotted".    +Bruit, + Thrill    Triage note:  Chief Complaint   Patient presents with    Vascular Access Problem     Not able to have dialysis today     Review of patient's allergies indicates:   Allergen Reactions    Atorvastatin Other (See Comments)     Statin induced myalgia    Mushroom Hives    Peanut Anaphylaxis    Bactrim [sulfamethoxazole-trimethoprim] Hives    Gabapentin      Pruritis     Iodine     Peanut butter flavor     Penicillins Hives    Shellfish containing products Itching    Sulfa (sulfonamide antibiotics) Hives     Past Medical History:   Diagnosis Date    Acute respiratory failure with hypoxia and hypercapnia 05/26/2019    ATN (acute tubular necrosis) 02/08/2019    Chronic diastolic heart failure 02/07/2019    2-8-19 Mild left atrial enlargement. Mild left ventricular enlargement. Normal left ventricular systolic function. The estimated ejection fraction is 55% Indeterminate left ventricular diastolic function. Normal right ventricular systolic function. Mild mitral regurgitation. Mild to moderate tricuspid regurgitation. The estimated PA systolic pressure is 32 mm Hg Normal central venous pressure (3 m    CKD (chronic kidney disease) stage 4, GFR 15-29 ml/min 05/16/2018    CKD stage G3a/A3, GFR 45-59 and albumin creatinine ratio >300 mg/g 05/16/2018    CKD stage G3b/A3, GFR 30-44 and albumin creatinine ratio >300 mg/g 05/16/2018    Closed compression fracture of L2 lumbar vertebra 05/24/2019    Closed compression fracture of L2 lumbar vertebra 05/24/2019     IMO Regulatory Load October 2019    Controlled type 2 diabetes with retinopathy 04/09/2018    COVID-19 virus infection 7-1-2021 07/01/2021    CVA (cerebral vascular accident) 2017    Diabetic polyneuropathy associated with type 2 diabetes mellitus 12/04/2020    Encounter " for blood transfusion     Essential hypertension 04/09/2018    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  7-31-19 Factor XI Activity 55 - 145 % 96       Gastroesophageal reflux disease without esophagitis 05/24/2019    Generalized anxiety disorder 11/08/2019    GERD (gastroesophageal reflux disease)     GIB (gastrointestinal bleeding) 10/18/2018    History of CVA (cerebrovascular accident) without residual deficits 11/27/2022    History of hepatitis C     s/p succesful Rx w/ [facility]  - SVR12 (cure) 2016    Hypertensive retinopathy, bilateral 12/10/2019    Mild nonproliferative diabetic retinopathy 05/02/2018    Mixed hyperlipidemia 04/09/2018    Myalgia due to statin 09/07/2022    Normocytic anemia 01/14/2022    NS (nuclear sclerosis), bilateral 12/10/2019    PNA (pneumonia) 05/28/2019    Postoperative hypothyroidism     Renovascular hypertension 04/09/2018    S/P kyphoplasty 08/14/2019 8/14/19: L2 kyphoplasty with Dr. Kwon     Slow transit constipation 05/27/2019    Type 2 diabetes mellitus with circulatory disorder, with long-term current use of insulin 04/09/2018    Type 2 diabetes mellitus with stage 3 chronic kidney disease, without long-term current use of insulin 04/09/2018    Type 2 diabetes mellitus with stage 3a chronic kidney disease, without long-term current use of insulin 03/03/2021    Type 2 diabetes mellitus with stage 3b chronic kidney disease, without long-term current use of insulin 09/07/2022

## 2023-07-22 NOTE — ACP (ADVANCE CARE PLANNING)
Advance Care Planning     Date: 07/21/2023    [facility]  I engaged the patient in a voluntary conversation about advance care planning and we specifically addressed what the goals of care would be moving forward, in light of the patient's change in clinical status, specifically   If her heart stops or she is unable to breath on her own.  We did not specifically address the patient's likely prognosis, which is fair .  We explored the patient's values and preferences for future care.  The patient endorses that what is most important right now is to focus on extending life as long as possible, even it it means sacrificing quality    Accordingly, we have decided that the best plan to meet the patient's goals includes continuing with treatment    I did not explain the role for hospice care at this stage of the patient's illness, including its ability to help the patient live with the best quality of life possible.  We will not be making a hospice referral.    Code Status: Full Code    I spent a total of 15 minutes engaging the patient in this advance care planning discussion.    Maite Leija DO  Staff Physician, Hospital Medicine.

## 2023-07-22 NOTE — PLAN OF CARE
07/22/23 1407   Post-Acute Status   Post-Acute Authorization Other   Other Status No Post-Acute Service Needs   Discharge Delays None known at this time   Discharge Plan   Discharge Plan A Home     No post acute services required at this time      Johanna Villagran CD, MSW, LMSW, RSW   Case Management  [facility]  Email: [email or web address]

## 2023-07-22 NOTE — PHARMACY MED REC
"Admission Medication History     The home medication history was taken by Terry Gruber.    You may go to "Admission" then "Reconcile Home Medications" tabs to review and/or act upon these items.     The home medication list has been updated by the Pharmacy department.   Please read ALL comments highlighted in yellow.   Please address this information as you see fit.    Feel free to contact us if you have any questions or require assistance.      The medications listed below were removed from the home medication list. Please reorder if appropriate:  Patient reports no longer taking the following medication(s):  MICONAZOLE 2 % CREAM    Current Outpatient Medications on File Prior to Encounter   Medication Sig    calcium acetate,phosphat bind, (PHOSLO) 667 mg capsule   Take 1 capsule (667 mg total) by mouth 3 (three) times daily with meals.    carvediloL (COREG) 25 MG tablet   Take 1 tablet (25 mg total) by mouth 2 (two) times daily with meals.    cholecalciferol, vitamin D3, 125 mcg (5,000 unit) Tab   Take 1 tablet (5,000 Units total) by mouth once daily.    EScitalopram oxalate (LEXAPRO) 10 MG tablet   Take 1 tablet (10 mg total) by mouth once daily.    ferrous gluconate (FERGON) 324 MG tablet   Take 1 tablet (324 mg total) by mouth daily with breakfast.    furosemide (LASIX) 40 MG tablet   Take 2 tablets (80 mg total) by mouth 2 (two) times a day.    levocetirizine (XYZAL) 5 MG tablet   Take 1 tablet (5 mg total) by mouth daily as needed for Allergies.    SYNTHROID 200 mcg tablet     Take 1 tablet (200 mcg total) by mouth before breakfast.    traZODone (DESYREL) 50 MG tablet   Take 1 to 2 tablets ( mg total) by mouth nightly as needed for Insomnia.    paricalcitoL (ZEMPLAR) 1 MCG capsule   Take 1 capsule (1 mcg total) by mouth once daily. (Patient not taking: Reported on 7/22/2023)       Potential issues to be addressed PRIOR TO DISCHARGE  Please discuss with the patient barriers to adherence with medication " treatment plans  Patient requires education regarding drug therapies   Patient requested refills for the following medications: (TRAZODONE 50 MG TABLET)    Terry Gruber  EXT [phone]                  .

## 2023-07-22 NOTE — CONSULTS
"[facility] - Emergency Dept  Nephrology  Consult Note    Patient Name: Kathie Quezada  MRN: [id number]  Admission Date: 7/21/2023  Hospital Length of Stay: 0 days  Attending Provider: Elan Alarcon MD   Primary Care Physician: Elan Price MD  Principal Problem:Clotted dialysis access    Inpatient consult to Nephrology  Consult performed by: Diana Kirkpatrick, BATSHEVA, FNP-C  Consult ordered by: Stepan Nuñez, PAMING  Reason for consult: CKD V        Subjective:     HPI: The patient is a 63 y.o. Black or  Female with multiple co morbidities including CKD V on HD, CHF, HTN, T2DM, and Factor XI deficiency who presents to ED on 7/21/2023 with complaints of  vascular access problem. Of note, LUE AVF was placed by Dr. Tavera in anticipation of HD with recent fistulogram and PTA on 3/23/23. Patient went to dialysis on 7/21 to initiate HD via access, however, it was noted to be "deep" and pulling clots per patient. She was sent to the ED for evaluation. Vascular was consulted and does not think the access is thrombosed and recommends attempting HD inpatient. She currently denies shortness of breath or other symptoms of fluid overload or uremia.  Labs in the emergency department consistent with ESRD and non emergent. + residual renal function. Admitted for HD and further evaluation by vascular. Fistula US ordered. Nephrology consulted for management of ESRD and HD treatment.      Past Medical History:   Diagnosis Date    Acute respiratory failure with hypoxia and hypercapnia 05/26/2019    ATN (acute tubular necrosis) 02/08/2019    Chronic diastolic heart failure 02/07/2019    2-8-19 Mild left atrial enlargement. Mild left ventricular enlargement. Normal left ventricular systolic function. The estimated ejection fraction is 55% Indeterminate left ventricular diastolic function. Normal right ventricular systolic function. Mild mitral regurgitation. Mild to moderate tricuspid regurgitation. The estimated PA " systolic pressure is 32 mm Hg Normal central venous pressure (3 m    CKD (chronic kidney disease) stage 4, GFR 15-29 ml/min 05/16/2018    CKD stage G3a/A3, GFR 45-59 and albumin creatinine ratio >300 mg/g 05/16/2018    CKD stage G3b/A3, GFR 30-44 and albumin creatinine ratio >300 mg/g 05/16/2018    Closed compression fracture of L2 lumbar vertebra 05/24/2019    Closed compression fracture of L2 lumbar vertebra 05/24/2019     IMO Regulatory Load October 2019    Controlled type 2 diabetes with retinopathy 04/09/2018    COVID-19 virus infection 7-1-2021 07/01/2021    CVA (cerebral vascular accident) 2017    Diabetic polyneuropathy associated with type 2 diabetes mellitus 12/04/2020    Encounter for blood transfusion     Essential hypertension 04/09/2018    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  7-31-19 Factor XI Activity 55 - 145 % 96       Gastroesophageal reflux disease without esophagitis 05/24/2019    Generalized anxiety disorder 11/08/2019    GERD (gastroesophageal reflux disease)     GIB (gastrointestinal bleeding) 10/18/2018    History of CVA (cerebrovascular accident) without residual deficits 11/27/2022    History of hepatitis C     s/p succesful Rx w/ [facility]  - SVR12 (cure) 2016    Hypertensive retinopathy, bilateral 12/10/2019    Mild nonproliferative diabetic retinopathy 05/02/2018    Mixed hyperlipidemia 04/09/2018    Myalgia due to statin 09/07/2022    Normocytic anemia 01/14/2022    NS (nuclear sclerosis), bilateral 12/10/2019    PNA (pneumonia) 05/28/2019    Postoperative hypothyroidism     Renovascular hypertension 04/09/2018    S/P kyphoplasty 08/14/2019 8/14/19: L2 kyphoplasty with Dr. Kwon     Slow transit constipation 05/27/2019    Type 2 diabetes mellitus with circulatory disorder, with long-term current use of insulin 04/09/2018    Type 2 diabetes mellitus with stage 3 chronic kidney disease, without long-term current use of insulin  04/09/2018    Type 2 diabetes mellitus with stage 3a chronic kidney disease, without long-term current use of insulin 03/03/2021    Type 2 diabetes mellitus with stage 3b chronic kidney disease, without long-term current use of insulin 09/07/2022       Past Surgical History:   Procedure Laterality Date    AV FISTULA PLACEMENT Left 10/27/2022    Procedure: CREATION, AV FISTULA;  Surgeon: Tirso Tavera MD;  [facility];  Service: Peripheral Vascular;  Laterality: Left;  Brachio basilic 1st stage     BACK SURGERY  2017    CHOLECYSTECTOMY  2015    [facility]    COLONOSCOPY  11/13/2015    Dr Chino torres prep. internal hemorrhoids. diverticulosis of sigmois. NO polyps. repeat due 2025    FISTULOGRAM N/A 12/16/2022    Procedure: Fistulogram;  Surgeon: Tirso Tavera MD;  Location: [facility] CATH LAB;  Service: Peripheral Vascular;  Laterality: N/A;    FISTULOGRAM, WITH PTA Left 3/23/2023    Procedure: FISTULOGRAM, WITH PTA;  Surgeon: Tirso Tavera MD;  Location: [facility] OR [facility];  Service: Peripheral Vascular;  Laterality: Left;  LUE TRANSRADIAL CO2 FISTULOGRAM    1.9 min  22.99 mGy  4.3032 Gy.cm  200cc CO2  9ml TransRadial Solution    FIXATION KYPHOPLASTY Bilateral 08/14/2019    Procedure: L2 kyphoplasty Fluoro Globus;  Surgeon: Jeremie Kwon DO;  Location: [facility] OR;  Service: Neurosurgery;  Laterality: Bilateral;  GLOBUS MACRINA WINSLOW [phone] TEXTED HER @ 10:54AM ON 7-30-19  PRE-OP-BY RN  8-7-2019    HYSTERECTOMY      OOPHORECTOMY  1996    one    PERCUTANEOUS TRANSLUMINAL ANGIOPLASTY OF ARTERIOVENOUS FISTULA Left 12/16/2022    Procedure: PTA, AV FISTULA;  Surgeon: Tirso Tavera MD;  Location: [facility] CATH LAB;  Service: Peripheral Vascular;  Laterality: Left;    RIGHT HEART CATHETERIZATION Right 6/15/2023    Procedure: INSERTION, CATHETER, RIGHT HEART;  Surgeon: Lucretia Rivas MD;  Location: [facility] CATH LAB;  Service: Cardiology;  Laterality: Right;    THYROIDECTOMY  2016    at [facility] for  thyroid nodule    TONSILLECTOMY      TRANSPOSITION OF BASILIC VEIN Left 1/31/2023    Procedure: TRANSPOSITION, VEIN, BASILIC;  Surgeon: Tirso Tavera MD;  Location: [facility] OR [address] [place] [address];  Service: Peripheral Vascular;  Laterality: Left;       Review of patient's allergies indicates:   Allergen Reactions    Atorvastatin Other (See Comments)     Statin induced myalgia    Mushroom Hives    Peanut Anaphylaxis    Bactrim [sulfamethoxazole-trimethoprim] Hives    Gabapentin      Pruritis     Iodine     Peanut butter flavor     Penicillins Hives    Shellfish containing products Itching    Sulfa (sulfonamide antibiotics) Hives     Current Facility-Administered Medications   Medication Frequency    0.9%  NaCl infusion Once    acetaminophen tablet 650 mg Q4H PRN    calcium acetate(phosphat bind) capsule 667 mg TID WM    carvediloL tablet 25 mg BID WM    cetirizine tablet 5 mg Daily PRN    cholecalciferol (vitamin D3) 125 mcg (5,000 unit) tablet 5,000 Units Daily    EScitalopram oxalate tablet 10 mg Daily    ferrous gluconate tablet 324 mg Daily with breakfast    furosemide tablet 80 mg BID    glucagon (human recombinant) injection 1 mg PRN    glucose chewable tablet 16 g PRN    glucose chewable tablet 24 g PRN    heparin (porcine) injection 5,000 Units Q8H    insulin aspart U-100 pen 0-5 Units Q6H    melatonin tablet 6 mg Nightly PRN    naloxone 0.4 mg/mL injection 0.02 mg PRN    ondansetron injection 4 mg Q8H PRN    paricalcitoL capsule 1 mcg Daily    simethicone chewable tablet 80 mg QID PRN    sodium chloride 0.9% flush 10 mL Q12H PRN    traZODone tablet 50 mg Nightly PRN     Current Outpatient Medications   Medication    calcium acetate,phosphat bind, (PHOSLO) 667 mg capsule    carvediloL (COREG) 25 MG tablet    cholecalciferol, vitamin D3, 125 mcg (5,000 unit) Tab    EScitalopram oxalate (LEXAPRO) 10 MG tablet    ferrous gluconate (FERGON) 324 MG tablet    furosemide (LASIX) 40 MG  tablet    levocetirizine (XYZAL) 5 MG tablet    SYNTHROID 200 mcg tablet    traZODone (DESYREL) 50 MG tablet    paricalcitoL (ZEMPLAR) 1 MCG capsule     Family History       Problem Relation (Age of Onset)    Aneurysm Brother    Coronary artery disease Mother    Diabetes type II Mother    Heart disease Mother    Hypertension Mother, Father    Prostate cancer Father    Stroke Mother          Tobacco Use    Smoking status: Never    Smokeless tobacco: Never   Substance and Sexual Activity    Alcohol use: Not Currently    Drug use: No    Sexual activity: Not Currently     Review of Systems   Constitutional:  Negative for chills, fatigue and fever.   HENT:  Negative for hearing loss.    Eyes:  Negative for visual disturbance.   Respiratory:  Negative for chest tightness and shortness of breath.    Gastrointestinal:  Negative for nausea and vomiting.   Genitourinary:  Negative for decreased urine volume.   Musculoskeletal:  Negative for gait problem.   Skin:  Negative for color change.   Neurological:  Negative for weakness and numbness.   Psychiatric/Behavioral:  Negative for agitation.    Objective:     Vital Signs (Most Recent):  Temp: 98.4 °F (36.9 °C) (07/22/23 0420)  Pulse: 68 (07/22/23 1400)  Resp: 12 (07/22/23 0420)  BP: (!) 151/91 (07/22/23 1400)  SpO2: 99 % (07/22/23 1400) Vital Signs (24h Range):  Temp:  [98.3 °F (36.8 °C)-98.5 °F (36.9 °C)] 98.4 °F (36.9 °C)  Pulse:  [64-75] 68  Resp:  [12-18] 12  SpO2:  [97 %-99 %] 99 %  BP: (136-193)/(78-95) 151/91     Weight: 107 kg (235 lb 14.3 oz) (07/21/23 1820)  Body mass index is 33.85 kg/m².  Body surface area is 2.3 meters squared.    No intake/output data recorded.     Physical Exam  Vitals and nursing note reviewed.   Constitutional:       Appearance: Normal appearance.   HENT:      Head: Normocephalic and atraumatic.      Mouth/Throat:      Mouth: Mucous membranes are moist.   Eyes:      General: No scleral icterus.  Cardiovascular:      Rate and Rhythm:  "Normal rate.      Arteriovenous access: Left arteriovenous access is present.     Comments: +thrill/bruit to LUE AVF  Pulmonary:      Effort: Pulmonary effort is normal.   Abdominal:      General: Abdomen is flat.      Palpations: Abdomen is soft.   Skin:     General: Skin is warm and dry.      Capillary Refill: Capillary refill takes less than 2 seconds.   Neurological:      General: No focal deficit present.      Mental Status: She is alert and oriented to person, place, and time.   Psychiatric:         Mood and Affect: Mood normal.         Behavior: Behavior normal.        Significant Labs:  CBC:   Recent Labs   Lab 07/22/23 0422   WBC 8.99   RBC 3.47*   HGB 9.6*   HCT 30.1*   *   MCV 87   MCH 27.7   MCHC 31.9*     CMP:   Recent Labs   Lab 07/21/23 2104 07/22/23 0422   GLU 79 98   CALCIUM 9.4 9.0   ALBUMIN 3.4*  --    PROT 7.4  --     143   K 4.1 4.1   CO2 23 22*    109   BUN 44* 46*   CREATININE 3.7* 3.5*   ALKPHOS 103  --    ALT 11  --    AST 14  --    BILITOT 0.4  --      All labs within the past 24 hours have been reviewed.    Assessment/Plan:     Cardiac/Vascular  * Clotted dialysis access  Admitted with concerns for clotted access. Vascular recommending HD inpatient and vascular US. AVF +/+ on exam.     - Will attempt HD while inpatient.    Renal/  Chronic kidney disease (CKD), stage V  63 y.o. Black or  Female CKD V - now HD dependent. M-W-F presents to ED on 7/21/2023 with concerns for clotted access.     LUE AVF was placed by Dr. Tavera in anticipation of HD with recent fistulogram and PTA on 3/23/23. Patient went to dialysis on 7/21 to initiate HD via access, however, it was noted to be "deep" and pulling clots per patient.    Nephrology consulted for inpatient ESRD-HD management    Outpatient HD Information:  -Dialysis modality: Hemodialysis  -Outpatient HD unit: [facility] ([address])  -Nephrologist: ?  -HD TX days: Monday/Wednesday/Friday, duration of " treatment: 3.5 hrs   -Dialysis access: LUE AV fistula   -Residual urine: + RRF  -EDW: new to HD, no established DW    Assessment:   - Will provide dialysis (first treatment) today (07/22/2023) at bedside with UF -1 L as BP tolerates for metabolic clearance and volume management. Will plan for HD over 2 hrs using R 300 filter w BFR//500 mL/min.   - Will plan for second treatment tomorrow or Monday if cannulation is successful and the patient remains impatient, otherwise, the patient can resume HD on Monday at OP center.   - Continue Lasix   - Continue to monitor intake and output  - Please avoid gadolinium, fleets, phos-based laxatives, NSAIDs  - Dialysis thrice weekly unless more urgent indications arise. Will evaluate RRT requirements Daily.    Anemia of CKD  Recent Labs   Lab 07/21/23  2104 07/22/23  0422   WBC 10.41 8.99   HGB 10.6* 9.6*   HCT 32.8* 30.1*   * 125*     Lab Results   Component Value Date    FESATURATED 29 05/16/2023    FERRITIN 475 (H) 06/05/2023       - Goal in ESRD is Hgb of 10-11. Hgb 9.6. Near target.   - EPO can be administered and dosed per his OP unit upon discharge.    Mineral Bone Disease in CKD   Lab Results   Component Value Date    .0 (H) 07/10/2023    CALCIUM 9.0 07/22/2023    PHOS 4.8 (H) 07/22/2023       - F/U PO4, Mg, Calcium. And albumin levels.   - Renal diet with protein intake goal 1.5 g/kg/d with 1 L fluid restriction   - Novasource with meals  - Continue home phos binder, phos 4.8    Thank you for your consult. I will follow-up with patient. Please contact us if you have any additional questions.    Diana Kirkpatrick, BATSHEVA, FNP-C  Nephrology  [facility] - [facility] Dept

## 2023-07-22 NOTE — ASSESSMENT & PLAN NOTE
-clotted AVF on left arm which was placed in March by Dr. Tavera in anticipation of HD  -site C/D/I and neurovascular intact left upper extremity   -vascular surgery consulted by ED pending evaluation  -npo for now pending vascular evaluation

## 2023-07-22 NOTE — CONSULTS
[facility] - Emergency Dept  Vascular Surgery  Consult Note    Inpatient consult to Vascular Surgery  Consult performed by: Jai Lomeli MD  Consult ordered by: Maru Esqueda PA-C        Subjective:     Chief Complaint/Reason for Admission: dialysis problem    History of Present Illness: 63-year-old woman with past medical history significant for end-stage renal disease, previous basilic vein transposition left arm.  Patient went to dialysis and was attempted to be accessed for the 1st time, however it was noted to be pulling clots and dialysis nurse stated that the fistula appeared to be deep.  Patient most recently had intervention on 03/23/2023 with a fistulogram and PTA.  Denies shortness of breath or other symptoms of fluid overload or uremia.  Labs in the emergency department significant for potassium 4.1 and BUN 46.       (Not in a hospital admission)      Review of patient's allergies indicates:   Allergen Reactions    Atorvastatin Other (See Comments)     Statin induced myalgia    Mushroom Hives    Peanut Anaphylaxis    Bactrim [sulfamethoxazole-trimethoprim] Hives    Gabapentin      Pruritis     Iodine     Peanut butter flavor     Penicillins Hives    Shellfish containing products Itching    Sulfa (sulfonamide antibiotics) Hives       Past Medical History:   Diagnosis Date    Acute respiratory failure with hypoxia and hypercapnia 05/26/2019    ATN (acute tubular necrosis) 02/08/2019    Chronic diastolic heart failure 02/07/2019    2-8-19 Mild left atrial enlargement. Mild left ventricular enlargement. Normal left ventricular systolic function. The estimated ejection fraction is 55% Indeterminate left ventricular diastolic function. Normal right ventricular systolic function. Mild mitral regurgitation. Mild to moderate tricuspid regurgitation. The estimated PA systolic pressure is 32 mm Hg Normal central venous pressure (3 m    CKD (chronic kidney disease) stage 4, GFR 15-29  ml/min 05/16/2018    CKD stage G3a/A3, GFR 45-59 and albumin creatinine ratio >300 mg/g 05/16/2018    CKD stage G3b/A3, GFR 30-44 and albumin creatinine ratio >300 mg/g 05/16/2018    Closed compression fracture of L2 lumbar vertebra 05/24/2019    Closed compression fracture of L2 lumbar vertebra 05/24/2019     IMO Regulatory Load October 2019    Controlled type 2 diabetes with retinopathy 04/09/2018    COVID-19 virus infection 7-1-2021 07/01/2021    CVA (cerebral vascular accident) 2017    Diabetic polyneuropathy associated with type 2 diabetes mellitus 12/04/2020    Encounter for blood transfusion     Essential hypertension 04/09/2018    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  7-31-19 Factor XI Activity 55 - 145 % 96       Gastroesophageal reflux disease without esophagitis 05/24/2019    Generalized anxiety disorder 11/08/2019    GERD (gastroesophageal reflux disease)     GIB (gastrointestinal bleeding) 10/18/2018    History of CVA (cerebrovascular accident) without residual deficits 11/27/2022    History of hepatitis C     s/p succesful Rx w/ [facility]  - SVR12 (cure) 2016    Hypertensive retinopathy, bilateral 12/10/2019    Mild nonproliferative diabetic retinopathy 05/02/2018    Mixed hyperlipidemia 04/09/2018    Myalgia due to statin 09/07/2022    Normocytic anemia 01/14/2022    NS (nuclear sclerosis), bilateral 12/10/2019    PNA (pneumonia) 05/28/2019    Postoperative hypothyroidism     Renovascular hypertension 04/09/2018    S/P kyphoplasty 08/14/2019 8/14/19: L2 kyphoplasty with Dr. Kwon     Slow transit constipation 05/27/2019    Type 2 diabetes mellitus with circulatory disorder, with long-term current use of insulin 04/09/2018    Type 2 diabetes mellitus with stage 3 chronic kidney disease, without long-term current use of insulin 04/09/2018    Type 2 diabetes mellitus with stage 3a chronic kidney disease, without long-term current use of insulin  03/03/2021    Type 2 diabetes mellitus with stage 3b chronic kidney disease, without long-term current use of insulin 09/07/2022     Past Surgical History:   Procedure Laterality Date    AV FISTULA PLACEMENT Left 10/27/2022    Procedure: CREATION, AV FISTULA;  Surgeon: Tirso Tavera MD;  [facility];  Service: Peripheral Vascular;  Laterality: Left;  Brachio basilic 1st stage     BACK SURGERY  2017    CHOLECYSTECTOMY  2015    [place]    COLONOSCOPY  11/13/2015    Dr Chino torres prep. internal hemorrhoids. diverticulosis of sigmois. NO polyps. repeat due 2025    FISTULOGRAM N/A 12/16/2022    Procedure: Fistulogram;  Surgeon: Tirso Tavera MD;  Location: [facility] CATH LAB;  Service: Peripheral Vascular;  Laterality: N/A;    FISTULOGRAM, WITH PTA Left 3/23/2023    Procedure: FISTULOGRAM, WITH PTA;  Surgeon: Tirso Tavera MD;  Location: [facility] OR [facility];  Service: Peripheral Vascular;  Laterality: Left;  LUE TRANSRADIAL CO2 FISTULOGRAM    1.9 min  22.99 mGy  4.3032 Gy.cm  200cc CO2  9ml TransRadial Solution    FIXATION KYPHOPLASTY Bilateral 08/14/2019    Procedure: L2 kyphoplasty Fluoro Globus;  Surgeon: Jeremie Kwon DO;  Location: [facility] OR;  Service: Neurosurgery;  Laterality: Bilateral;  GLOBUS MACRINA WINSLOW [phone] TEXTED HER @ 10:54AM ON 7-30-19  PRE-OP-BY RN  8-7-2019    HYSTERECTOMY      OOPHORECTOMY  1996    one    PERCUTANEOUS TRANSLUMINAL ANGIOPLASTY OF ARTERIOVENOUS FISTULA Left 12/16/2022    Procedure: PTA, AV FISTULA;  Surgeon: Tirso Tavera MD;  Location: [facility] CATH LAB;  Service: Peripheral Vascular;  Laterality: Left;    RIGHT HEART CATHETERIZATION Right 6/15/2023    Procedure: INSERTION, CATHETER, RIGHT HEART;  Surgeon: Lucretia Rivas MD;  Location: [facility] CATH LAB;  Service: Cardiology;  Laterality: Right;    THYROIDECTOMY  2016    at [facility] for thyroid nodule    TONSILLECTOMY      TRANSPOSITION OF BASILIC VEIN Left 1/31/2023    Procedure: TRANSPOSITION, VEIN,  BASILIC;  Surgeon: Tirso Tavera MD;  Location: [facility] OR [address] [place] [address];  Service: Peripheral Vascular;  Laterality: Left;     Family History       Problem Relation (Age of Onset)    Aneurysm Brother    Coronary artery disease Mother    Diabetes type II Mother    Heart disease Mother    Hypertension Mother, Father    Prostate cancer Father    Stroke Mother          Tobacco Use    Smoking status: Never    Smokeless tobacco: Never   Substance and Sexual Activity    Alcohol use: Not Currently    Drug use: No    Sexual activity: Not Currently     Review of Systems   Constitutional:  Negative for chills, fatigue and fever.   Respiratory:  Negative for chest tightness and shortness of breath.    Gastrointestinal:  Negative for nausea and vomiting.   Skin:  Negative for color change.   Neurological:  Negative for weakness and numbness.   Objective:     Vital Signs (Most Recent):  Temp: 98.4 °F (36.9 °C) (07/22/23 0420)  Pulse: 64 (07/22/23 0420)  Resp: 12 (07/22/23 0420)  BP: (!) 144/78 (07/22/23 0420)  SpO2: 97 % (07/22/23 0420) Vital Signs (24h Range):  Temp:  [98.3 °F (36.8 °C)-98.5 °F (36.9 °C)] 98.4 °F (36.9 °C)  Pulse:  [64-75] 64  Resp:  [12-18] 12  SpO2:  [97 %-98 %] 97 %  BP: (136-193)/(78-95) 144/78     Weight: 107 kg (235 lb 14.3 oz)  Body mass index is 33.85 kg/m².      Physical Exam  Constitutional:       Appearance: Normal appearance.   HENT:      Head: Normocephalic and atraumatic.      Mouth/Throat:      Mouth: Mucous membranes are moist.   Eyes:      Pupils: Pupils are equal, round, and reactive to light.   Cardiovascular:      Rate and Rhythm: Normal rate.      Comments: Strong thrill throughout the fistula, no pulsatility appreciated.  2+ radial pulse  5/5 strength in the hand  Pulmonary:      Effort: Pulmonary effort is normal.   Abdominal:      General: Abdomen is flat.      Palpations: Abdomen is soft.   Skin:     General: Skin is warm and dry.      Capillary Refill: Capillary refill takes less than 2  seconds.   Neurological:      General: No focal deficit present.      Mental Status: She is alert.        Significant Labs:  Recent Lab Results         07/22/23  0422   07/22/23  0009   07/21/23  2104        Albumin     3.4       Alkaline Phosphatase     103       ALT     11       Anion Gap 12     12       AST     14       Baso # 0.04     0.06       Basophil % 0.4     0.6       BILIRUBIN TOTAL     0.4  Comment: For infants and newborns, interpretation of results should be based  on gestational age, weight and in agreement with clinical  observations.    Premature Infant recommended reference ranges:  Up to 24 hours.............<8.0 mg/dL  Up to 48 hours............<12.0 mg/dL  3-5 days..................<15.0 mg/dL  6-29 days.................<15.0 mg/dL         BNP     181  Comment: Values of less than 100 pg/ml are consistent with non-CHF populations.       BUN 46     44       Calcium 9.0     9.4       Chloride 109     106       CO2 22     23       Creatinine 3.5     3.7       Differential Method Automated     Automated       eGFR 14.3     13.3       Eos # 0.2     0.1       Eosinophil % 1.8     1.2       Glucose 98     79       Gran # (ANC) 5.2     6.8       Gran % 57.9     65.5       Hematocrit 30.1     32.8       Hemoglobin 9.6     10.6       Immature Grans (Abs) 0.04  Comment: Mild elevation in immature granulocytes is non specific and   can be seen in a variety of conditions including stress response,   acute inflammation, trauma and pregnancy. Correlation with other   laboratory and clinical findings is essential.       0.06  Comment: Mild elevation in immature granulocytes is non specific and   can be seen in a variety of conditions including stress response,   acute inflammation, trauma and pregnancy. Correlation with other   laboratory and clinical findings is essential.         Immature Granulocytes 0.4     0.6       Lymph # 2.8     2.6       Lymph % 31.0     25.3       Magnesium 1.9           MCH 27.7      28.1       MCHC 31.9     32.3       MCV 87     87       Mono # 0.8     0.7       Mono % 8.5     6.8       MPV 10.5     10.7       nRBC 0     0       Phosphorus 4.8           Platelets 125     142       POCT Glucose   81         Potassium 4.1     4.1       PROTEIN TOTAL     7.4       RBC 3.47     3.77       RDW 13.9     13.7       Sodium 143     141       Troponin I     0.011  Comment: The reference interval for Troponin I represents the 99th percentile   cutoff   for our facility and is consistent with 3rd generation assay   performance.         WBC 8.99     10.41               Significant Diagnostics:  I have reviewed all pertinent imaging results/findings within the past 24 hours.    Assessment/Plan:     * Clotted dialysis access  63-year-old woman with past medical history significant for end-stage renal disease, previous basilic vein transposition left arm.  Patient went to dialysis and was attempted to be accessed for the 1st time, however it was noted to be pulling clots and dialysis nurse stated that the fistula appeared to be deep.     Dialysis access is not thrombosed  Possible there is recurrent stenosis present  Recommend attempting dialysis access here in the hospital  Ultrasound of the fistula to determine depth and whether or not a superficialization is necessary  No indication for acute intervention at this time        Thank you for your consult. I will follow-up with patient. Please contact us if you have any additional questions.    CHRISTIANO ABERNATHY MD  Vascular Surgery  [facility] - Emergency Dept

## 2023-07-22 NOTE — HPI
63-year-old woman with past medical history significant for end-stage renal disease, previous basilic vein transposition left arm.  Patient went to dialysis and was attempted to be accessed for the 1st time, however it was noted to be pulling clots and dialysis nurse stated that the fistula appeared to be deep.  Patient most recently had intervention on 03/23/2023 with a fistulogram and PTA.  Denies shortness of breath or other symptoms of fluid overload or uremia.  Labs in the emergency department significant for potassium 4.1 and BUN 46.

## 2023-07-22 NOTE — ASSESSMENT & PLAN NOTE
"63 y.o. Black or  Female CKD V - now HD dependent. [id number] presents to ED on 7/21/2023 with concerns for clotted access.     LUE AVF was placed by Dr. Tavera in anticipation of HD with recent fistulogram and PTA on 3/23/23. Patient went to dialysis on 7/21 to initiate HD via access, however, it was noted to be "deep" and pulling clots per patient.    Nephrology consulted for inpatient ESRD-HD management    Outpatient HD Information:  -Dialysis modality: Hemodialysis  -Outpatient HD [facility]: [facility])  -Nephrologist: ?  -HD TX days: Monday/Wednesday/Friday, duration of treatment: 3.5 hrs   -Dialysis access: LUE AV fistula   -Residual urine: + RRF  -EDW: new to HD, no established DW    Assessment:   - Will provide dialysis (first treatment) today (07/22/2023) at bedside with UF -1 L as BP tolerates for metabolic clearance and volume management. Will plan for HD over 2 hrs using R 300 filter w BFR//500 mL/min.   - Will plan for second treatment tomorrow or Monday if cannulation is successful and the patient remains impatient, otherwise, the patient can resume HD on Monday after OP center.   - Continue Lasix   - Continue to monitor intake and output  - Please avoid gadolinium, fleets, phos-based laxatives, NSAIDs  - Dialysis thrice weekly unless more urgent indications arise. Will evaluate RRT requirements Daily.    Anemia of CKD  Recent Labs   Lab 07/21/23  2104 07/22/23  0422   WBC 10.41 8.99   HGB 10.6* 9.6*   HCT 32.8* 30.1*   * 125*     Lab Results   Component Value Date    FESATURATED 29 05/16/2023    FERRITIN 475 (H) 06/05/2023       - Goal in ESRD is Hgb of 10-11. Hgb 9.6. Near target.   - EPO can be administered and dosed per his OP unit upon discharge.    Mineral Bone Disease in CKD   Lab Results   Component Value Date    .0 (H) 07/10/2023    CALCIUM 9.0 07/22/2023    PHOS 4.8 (H) 07/22/2023       - F/U PO4, Mg, Calcium. And albumin levels.   - Renal diet with " protein intake goal 1.5 g/kg/d with 1 L fluid restriction   - Novasource with meals  - Continue home phos binder, phos 4.8

## 2023-07-22 NOTE — ED PROVIDER NOTES
Encounter Date: 7/21/2023       History     Chief Complaint   Patient presents with    Vascular Access Problem     Not able to have dialysis today     63-year-old female with a  pmhx of CKD, CHF, HTN, T2DM, Factor XI deficiency presents to ED for vascular access problem.  She was supposed to start dialysis for the first time today.  Fistula was unable to be accessed due to blood clots.  Fistula is located in her left upper arm.  It was placed in 10/2022. She denies pain at fistula site. She  spoke with her vascular surgeon who recommended ED evaluation and vascular surgery consultation. Patient overall feels tired and nauseated. Denies chest pain, shortness of breath, paresthesias, weakness, vomiting, fever.     The history is provided by the patient.   Review of patient's allergies indicates:   Allergen Reactions    Atorvastatin Other (See Comments)     Statin induced myalgia    Mushroom Hives    Peanut Anaphylaxis    Bactrim [sulfamethoxazole-trimethoprim] Hives    Gabapentin      Pruritis     Iodine     Peanut butter flavor     Penicillins Hives    Shellfish containing products Itching    Sulfa (sulfonamide antibiotics) Hives     Past Medical History:   Diagnosis Date    Acute respiratory failure with hypoxia and hypercapnia 05/26/2019    ATN (acute tubular necrosis) 02/08/2019    Chronic diastolic heart failure 02/07/2019    2-8-19 Mild left atrial enlargement. Mild left ventricular enlargement. Normal left ventricular systolic function. The estimated ejection fraction is 55% Indeterminate left ventricular diastolic function. Normal right ventricular systolic function. Mild mitral regurgitation. Mild to moderate tricuspid regurgitation. The estimated PA systolic pressure is 32 mm Hg Normal central venous pressure (3 m    CKD (chronic kidney disease) stage 4, GFR 15-29 ml/min 05/16/2018    CKD stage G3a/A3, GFR 45-59 and albumin creatinine ratio >300 mg/g 05/16/2018    CKD stage G3b/A3, GFR 30-44 and albumin  creatinine ratio >300 mg/g 05/16/2018    Closed compression fracture of L2 lumbar vertebra 05/24/2019    Closed compression fracture of L2 lumbar vertebra 05/24/2019     IMO Regulatory Load October 2019    Controlled type 2 diabetes with retinopathy 04/09/2018    COVID-19 virus infection 7-1-2021 07/01/2021    CVA (cerebral vascular accident) 2017    Diabetic polyneuropathy associated with type 2 diabetes mellitus 12/04/2020    Encounter for blood transfusion     Essential hypertension 04/09/2018    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  7-31-19 Factor XI Activity 55 - 145 % 96       Gastroesophageal reflux disease without esophagitis 05/24/2019    Generalized anxiety disorder 11/08/2019    GERD (gastroesophageal reflux disease)     GIB (gastrointestinal bleeding) 10/18/2018    History of CVA (cerebrovascular accident) without residual deficits 11/27/2022    History of hepatitis C     s/p succesful Rx w/ [facility]  - [id number] (cure) 2016    Hypertensive retinopathy, bilateral 12/10/2019    Mild nonproliferative diabetic retinopathy 05/02/2018    Mixed hyperlipidemia 04/09/2018    Myalgia due to statin 09/07/2022    Normocytic anemia 01/14/2022    NS (nuclear sclerosis), bilateral 12/10/2019    PNA (pneumonia) 05/28/2019    Postoperative hypothyroidism     Renovascular hypertension 04/09/2018    S/P kyphoplasty 08/14/2019 8/14/19: L2 kyphoplasty with Dr. Kwon     Slow transit constipation 05/27/2019    Type 2 diabetes mellitus with circulatory disorder, with long-term current use of insulin 04/09/2018    Type 2 diabetes mellitus with stage 3 chronic kidney disease, without long-term current use of insulin 04/09/2018    Type 2 diabetes mellitus with stage 3a chronic kidney disease, without long-term current use of insulin 03/03/2021    Type 2 diabetes mellitus with stage 3b chronic kidney disease, without long-term current use of insulin 09/07/2022     Past Surgical History:   Procedure  Laterality Date    AV FISTULA PLACEMENT Left 10/27/2022    Procedure: CREATION, AV FISTULA;  Surgeon: Tirso Tavera MD;  Location: [address];  Service: Peripheral Vascular;  Laterality: Left;  Brachio basilic 1st stage     BACK SURGERY  2017    CHOLECYSTECTOMY  2015    [facility]    COLONOSCOPY  11/13/2015    Dr Chino torres prep. internal hemorrhoids. diverticulosis of sigmois. NO polyps. repeat due 2025    FISTULOGRAM N/A 12/16/2022    Procedure: Fistulogram;  Surgeon: Tirso Tavera MD;  Location: [facility] CATH LAB;  Service: Peripheral Vascular;  Laterality: N/A;    FISTULOGRAM, WITH PTA Left 3/23/2023    Procedure: FISTULOGRAM, WITH PTA;  Surgeon: Tirso Tavera MD;  [facility];  Service: Peripheral Vascular;  Laterality: Left;  LUE TRANSRADIAL CO2 FISTULOGRAM    1.9 min  22.99 mGy  4.3032 Gy.cm  200cc CO2  9ml TransRadial Solution    FIXATION KYPHOPLASTY Bilateral 08/14/2019    Procedure: L2 kyphoplasty Fluoro Globus;  Surgeon: Jeremie Kwon DO;  Location: [facility];  Service: Neurosurgery;  Laterality: Bilateral;  GLOBUS MACRINA WINSLOW [phone] TEXTED HER @ 10:54AM ON 7-30-19  PRE-OP-BY RN  8-7-2019    HYSTERECTOMY      OOPHORECTOMY  1996    one    PERCUTANEOUS TRANSLUMINAL ANGIOPLASTY OF ARTERIOVENOUS FISTULA Left 12/16/2022    Procedure: PTA, AV FISTULA;  Surgeon: Tirso Tavera MD;  Location: [facility] CATH LAB;  Service: Peripheral Vascular;  Laterality: Left;    RIGHT HEART CATHETERIZATION Right 6/15/2023    Procedure: INSERTION, CATHETER, RIGHT HEART;  Surgeon: Lucretia Rivas MD;  Location: [facility] CATH LAB;  Service: Cardiology;  Laterality: Right;    THYROIDECTOMY  2016    at [facility] for thyroid nodule    TONSILLECTOMY      TRANSPOSITION OF BASILIC VEIN Left 1/31/2023    Procedure: TRANSPOSITION, VEIN, BASILIC;  Surgeon: Tirso Tavera MD;  [facility];  Service: Peripheral Vascular;  Laterality: Left;     Family History   Problem Relation Age of Onset    Stroke Mother      Heart disease Mother     Hypertension Mother     Diabetes type II Mother     Coronary artery disease Mother     Hypertension Father     Prostate cancer Father     Aneurysm Brother      Social History     Tobacco Use    Smoking status: Never    Smokeless tobacco: Never   Substance Use Topics    Alcohol use: Not Currently    Drug use: No     Review of Systems   Constitutional:  Negative for fever.   HENT:  Negative for sore throat.    Respiratory:  Negative for shortness of breath.    Cardiovascular:  Positive for leg swelling. Negative for chest pain and palpitations.   Gastrointestinal:  Positive for nausea. Negative for abdominal pain and vomiting.   Genitourinary:  Negative for dysuria.   Musculoskeletal:  Positive for myalgias. Negative for back pain.   Skin:  Negative for color change and rash.   Neurological:  Negative for weakness.   Hematological:  Does not bruise/bleed easily.     Physical Exam     Initial Vitals [07/21/23 1820]   BP Pulse Resp Temp SpO2   (!) 189/89 75 18 98.5 °F (36.9 °C) 98 %      MAP       --         Physical Exam    Nursing note and vitals reviewed.  Constitutional: She appears well-developed and well-nourished. She is not diaphoretic. No distress.   HENT:   Head: Normocephalic and atraumatic.   Nose: Nose normal.   Eyes: Conjunctivae and EOM are normal.   Neck: Neck supple.   Cardiovascular:  Normal rate, regular rhythm, normal heart sounds and intact distal pulses.           Pulmonary/Chest: Breath sounds normal. No respiratory distress.   Musculoskeletal:      Cervical back: Neck supple.      Right lower leg: No edema.      Left lower leg: No edema.      Comments: AV fistula noted in L upper arm. Thrill/ bruit present. No observed erythema, edema, tenderness      Neurological: She is alert and oriented to person, place, and time. Gait normal.   Skin: No rash noted.   Psychiatric: She has a normal mood and affect. Her behavior is normal. Judgment and thought content normal.        ED Course   Procedures  Labs Reviewed   CBC W/ AUTO DIFFERENTIAL - Abnormal; Notable for the following components:       Result Value    RBC 3.77 (*)     Hemoglobin 10.6 (*)     Hematocrit 32.8 (*)     Platelets 142 (*)     Immature Granulocytes 0.6 (*)     Immature Grans (Abs) 0.06 (*)     All other components within normal limits   COMPREHENSIVE METABOLIC PANEL - Abnormal; Notable for the following components:    BUN 44 (*)     Creatinine 3.7 (*)     Albumin 3.4 (*)     eGFR 13.3 (*)     All other components within normal limits   B-TYPE NATRIURETIC PEPTIDE - Abnormal; Notable for the following components:     (*)     All other components within normal limits   BASIC METABOLIC PANEL - Abnormal; Notable for the following components:    CO2 22 (*)     BUN 46 (*)     Creatinine 3.5 (*)     eGFR 14.3 (*)     All other components within normal limits   PHOSPHORUS - Abnormal; Notable for the following components:    Phosphorus 4.8 (*)     All other components within normal limits   CBC W/ AUTO DIFFERENTIAL - Abnormal; Notable for the following components:    RBC 3.47 (*)     Hemoglobin 9.6 (*)     Hematocrit 30.1 (*)     MCHC 31.9 (*)     Platelets 125 (*)     All other components within normal limits   TROPONIN I   MAGNESIUM   POCT GLUCOSE   POCT GLUCOSE MONITORING CONTINUOUS     EKG Readings: (Independently Interpreted)   Initial Reading: No STEMI. Rhythm: Normal Sinus Rhythm. Heart Rate: 70. Ectopy: No Ectopy. Conduction: Normal. ST Segments: Normal ST Segments. T Waves: Normal. Axis: Normal. Clinical Impression: Normal Sinus Rhythm   ECG Results              EKG 12-lead (Final result)  Result time 07/22/23 08:25:14      Final result by Interface, Lab In [facility] (07/22/23 08:25:14)                   Narrative:    Test Reason : N18.9,    Vent. Rate : 070 BPM     Atrial Rate : 070 BPM     P-R Int : 184 ms          QRS Dur : 098 ms      QT Int : 416 ms       P-R-T Axes : 017 034 037 degrees     QTc Int :  449 ms    Normal sinus rhythm  Normal ECG  When compared with ECG of 01-FEB-2023 13:46,  No significant change was found  Confirmed by Mark TREJO MD ([id number]) on 7/22/2023 8:25:07 AM    Referred By: [email or web address]   SELF           Confirmed By:Mark TREJO MD                                  Imaging Results    None          Medications   calcium acetate(phosphat bind) capsule 667 mg (667 mg Oral Given 7/22/23 1102)   carvediloL tablet 25 mg (25 mg Oral Given 7/22/23 0824)   cholecalciferol (vitamin D3) 125 mcg (5,000 unit) tablet 5,000 Units (5,000 Units Oral Given 7/22/23 1102)   EScitalopram oxalate tablet 10 mg (10 mg Oral Given 7/22/23 0824)   ferrous gluconate tablet 324 mg (324 mg Oral Given 7/22/23 0824)   furosemide tablet 80 mg (80 mg Oral Given 7/22/23 0824)   cetirizine tablet 5 mg (has no administration in time range)   paricalcitoL capsule 1 mcg (1 mcg Oral Given 7/22/23 1102)   traZODone tablet 50 mg (has no administration in time range)   sodium chloride 0.9% flush 10 mL (has no administration in time range)   naloxone 0.4 mg/mL injection 0.02 mg (has no administration in time range)   glucose chewable tablet 16 g (has no administration in time range)   glucose chewable tablet 24 g (has no administration in time range)   glucagon (human recombinant) injection 1 mg (has no administration in time range)   heparin (porcine) injection 5,000 Units (5,000 Units Subcutaneous Given 7/22/23 0626)   acetaminophen tablet 650 mg (has no administration in time range)   ondansetron injection 4 mg (4 mg Intravenous Given by Other 7/22/23 1059)   melatonin tablet 6 mg (has no administration in time range)   simethicone chewable tablet 80 mg (has no administration in time range)   insulin aspart U-100 pen 0-5 Units (0 Units Subcutaneous Not Given 7/22/23 0600)   acetaminophen tablet 650 mg (650 mg Oral Given 7/21/23 2159)   ondansetron disintegrating tablet 4 mg (4 mg Oral Given 7/21/23 2159)     Medical Decision Making:    Initial Assessment:   63-year-old female with a  PMHx of CKD, CHF, HTN, T2DM, Factor XI deficiency presents to ED for vascular access problem.  She is nontoxic appearing.  Vitals significant for hypertension.  I will initiate workup and plan on consulting vascular surgery for further evaluation  Differential Diagnosis:   Thrombus, stenosis of fistula, infection,   Independently Interpreted Test(s):   I have ordered and independently interpreted EKG Reading(s) - see prior notes  Clinical Tests:   Lab Tests: Ordered and Reviewed  Medical Tests: Ordered and Reviewed  ED Management:  Patient is overall stable at this time. No significant electrolyte derangements. Cr at baseline for patient. EKG shows regular rate and rhythm without ST changes. Negative troponin. Denies chest pain, SOB, paresthesias, weakness. Vital remarkable for hypertension only. Spoke with vascular surgery does not believe emergent intervention is indicated at this time due to patient stability. Recommends admit to  for fistulogram and inpatient consultation with nephrology.   Other:   I discussed test(s) with the performing physician.           ED Course as of 07/22/23 1118   Fri Jul 21, 2023 2221 Creatinine(!): 3.7 []   2221 eGFR(!): 13.3 []   2221 BUN(!): 44 [HM]   2221 Hemoglobin(!): 10.6 []      ED Course User Index  [HM] Maru Esqueda PA-C                 Clinical Impression:   Final diagnoses:  [N18.9] Chronic kidney disease (CKD)  [T82.9XXA] Complication of vascular access for dialysis, initial encounter (Primary)        ED Disposition Condition    Observation Stable                Maru Esqueda PA-C  07/22/23 1119

## 2023-07-22 NOTE — SUBJECTIVE & OBJECTIVE
Past Medical History:   Diagnosis Date    Acute respiratory failure with hypoxia and hypercapnia 05/26/2019    ATN (acute tubular necrosis) 02/08/2019    Chronic diastolic heart failure 02/07/2019    2-8-19 Mild left atrial enlargement. Mild left ventricular enlargement. Normal left ventricular systolic function. The estimated ejection fraction is 55% Indeterminate left ventricular diastolic function. Normal right ventricular systolic function. Mild mitral regurgitation. Mild to moderate tricuspid regurgitation. The estimated PA systolic pressure is 32 mm Hg Normal central venous pressure (3 m    CKD (chronic kidney disease) stage 4, GFR 15-29 ml/min 05/16/2018    CKD stage G3a/A3, GFR 45-59 and albumin creatinine ratio >300 mg/g 05/16/2018    CKD stage G3b/A3, GFR 30-44 and albumin creatinine ratio >300 mg/g 05/16/2018    Closed compression fracture of L2 lumbar vertebra 05/24/2019    Closed compression fracture of L2 lumbar vertebra 05/24/2019     IMO Regulatory Load October 2019    Controlled type 2 diabetes with retinopathy 04/09/2018    COVID-19 virus infection 7-1-2021 07/01/2021    CVA (cerebral vascular accident) 2017    Diabetic polyneuropathy associated with type 2 diabetes mellitus 12/04/2020    Encounter for blood transfusion     Essential hypertension 04/09/2018    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  7-31-19 Factor XI Activity 55 - 145 % 96       Gastroesophageal reflux disease without esophagitis 05/24/2019    Generalized anxiety disorder 11/08/2019    GERD (gastroesophageal reflux disease)     GIB (gastrointestinal bleeding) 10/18/2018    History of CVA (cerebrovascular accident) without residual deficits 11/27/2022    History of hepatitis C     s/p succesful Rx w/ P[facility]ha  - SVR12 (cure) 2016    Hypertensive retinopathy, bilateral 12/10/2019    Mild nonproliferative diabetic retinopathy 05/02/2018    Mixed hyperlipidemia 04/09/2018    Myalgia due to  statin 09/07/2022    Normocytic anemia 01/14/2022    NS (nuclear sclerosis), bilateral 12/10/2019    PNA (pneumonia) 05/28/2019    Postoperative hypothyroidism     Renovascular hypertension 04/09/2018    S/P kyphoplasty 08/14/2019 8/14/19: L2 kyphoplasty with Dr. Kwon     Slow transit constipation 05/27/2019    Type 2 diabetes mellitus with circulatory disorder, with long-term current use of insulin 04/09/2018    Type 2 diabetes mellitus with stage 3 chronic kidney disease, without long-term current use of insulin 04/09/2018    Type 2 diabetes mellitus with stage 3a chronic kidney disease, without long-term current use of insulin 03/03/2021    Type 2 diabetes mellitus with stage 3b chronic kidney disease, without long-term current use of insulin 09/07/2022       Past Surgical History:   Procedure Laterality Date    AV FISTULA PLACEMENT Left 10/27/2022    Procedure: CREATION, AV FISTULA;  Surgeon: Tirso Tavera MD;  [facility];  Service: Peripheral Vascular;  Laterality: Left;  Brachio basilic 1st stage     BACK SURGERY  2017    CHOLECYSTECTOMY  2015    [facility]    COLONOSCOPY  11/13/2015    Dr Chino torres prep. internal hemorrhoids. diverticulosis of sigmois. NO polyps. repeat due 2025    FISTULOGRAM N/A 12/16/2022    Procedure: Fistulogram;  Surgeon: Tirso Tavera MD;  Location: [facility] CATH LAB;  Service: Peripheral Vascular;  Laterality: N/A;    FISTULOGRAM, WITH PTA Left 3/23/2023    Procedure: FISTULOGRAM, WITH PTA;  Surgeon: Tirso Tavera MD;  Location: [facility] OR [facility];  Service: Peripheral Vascular;  Laterality: Left;  LUE TRANSRADIAL CO2 FISTULOGRAM    1.9 min  22.99 mGy  4.3032 Gy.cm  200cc CO2  9ml TransRadial Solution    FIXATION KYPHOPLASTY Bilateral 08/14/2019    Procedure: L2 kyphoplasty Fluoro Globus;  Surgeon: Jeremie Kwon DO;  Location: [facility] OR;  Service: Neurosurgery;  Laterality: Bilateral;  GLOBUS MACRINA WINSLOW [phone] TEXTED HER @ 10:54AM ON  7-30-19  PRE-OP-BY RN  8-7-2019    HYSTERECTOMY      OOPHORECTOMY  1996    one    PERCUTANEOUS TRANSLUMINAL ANGIOPLASTY OF ARTERIOVENOUS FISTULA Left 12/16/2022    Procedure: PTA, AV FISTULA;  Surgeon: Tirso Tavera MD;  Location: [facility] CATH LAB;  Service: Peripheral Vascular;  Laterality: Left;    RIGHT HEART CATHETERIZATION Right 6/15/2023    Procedure: INSERTION, CATHETER, RIGHT HEART;  Surgeon: Lucretia Rivas MD;  Location: [facility] CATH LAB;  Service: Cardiology;  Laterality: Right;    THYROIDECTOMY  2016    at [facility] for [facility] nodule    TONSILLECTOMY      TRANSPOSITION OF BASILIC VEIN Left 1/31/2023    Procedure: TRANSPOSITION, VEIN, BASILIC;  Surgeon: Tirso Tavera MD;  Location: [facility] OR [address] [place] [address];  Service: Peripheral Vascular;  Laterality: Left;       Review of patient's allergies indicates:   Allergen Reactions    Atorvastatin Other (See Comments)     Statin induced myalgia    Mushroom Hives    Peanut Anaphylaxis    Bactrim [sulfamethoxazole-trimethoprim] Hives    Gabapentin      Pruritis     Iodine     Peanut butter flavor     Penicillins Hives    Shellfish containing products Itching    Sulfa (sulfonamide antibiotics) Hives       No current facility-administered medications on file prior to encounter.     Current Outpatient Medications on File Prior to Encounter   Medication Sig    calcium acetate,phosphat bind, (PHOSLO) 667 mg capsule Take 1 capsule (667 mg total) by mouth 3 (three) times daily with meals.    carvediloL (COREG) 25 MG tablet Take 1 tablet (25 mg total) by mouth 2 (two) times daily with meals.    cholecalciferol, vitamin D3, 125 mcg (5,000 unit) Tab Take 1 tablet (5,000 Units total) by mouth once daily.    EScitalopram oxalate (LEXAPRO) 10 MG tablet Take 1 tablet (10 mg total) by mouth once daily.    ferrous gluconate (FERGON) 324 MG tablet Take 1 tablet (324 mg total) by mouth daily with breakfast.    furosemide (LASIX) 40 MG tablet Take 2 tablets (80 mg total) by  mouth 2 (two) times a day.    levocetirizine (XYZAL) 5 MG tablet Take 1 tablet (5 mg total) by mouth daily as needed for Allergies.    miconazole (MICOTIN) 2 % cream Apply topically 2 (two) times daily to Bilateral feet    paricalcitoL (ZEMPLAR) 1 MCG capsule Take 1 capsule (1 mcg total) by mouth once daily.    SYNTHROID 200 mcg tablet Take 1 tablet (200 mcg total) by mouth before breakfast.    traZODone (DESYREL) 50 MG tablet Take 1 to 2 tablets ( mg total) by mouth nightly as needed for Insomnia.     Family History       Problem Relation (Age of Onset)    Aneurysm Brother    Coronary artery disease Mother    Diabetes type II Mother    Heart disease Mother    Hypertension Mother, Father    Prostate cancer Father    Stroke Mother          Tobacco Use    Smoking status: Never    Smokeless tobacco: Never   Substance and Sexual Activity    Alcohol use: Not Currently    Drug use: No    Sexual activity: Not Currently     Review of Systems   Constitutional:  Positive for fatigue. Negative for activity change, appetite change, chills, diaphoresis and fever.   HENT:  Negative for congestion, dental problem, drooling, ear discharge, ear pain, facial swelling, hearing loss, mouth sores, nosebleeds, postnasal drip, rhinorrhea, sinus pressure, sneezing, sore throat, tinnitus, trouble swallowing and voice change.    Eyes:  Negative for photophobia, pain, discharge, redness, itching and visual disturbance.   Respiratory:  Negative for apnea, cough, choking, chest tightness, shortness of breath, wheezing and stridor.    Cardiovascular:  Negative for chest pain, palpitations and leg swelling.        Clotted AVF on left arm    Gastrointestinal:  Positive for nausea. Negative for abdominal distention, abdominal pain, anal bleeding, blood in stool, constipation, diarrhea, rectal pain and vomiting.   Endocrine: Negative for cold intolerance, heat intolerance, polydipsia, polyphagia and polyuria.   Genitourinary:   Negative for decreased urine volume, difficulty urinating, dyspareunia, dysuria, enuresis, flank pain, frequency, genital sores, hematuria, menstrual problem, pelvic pain, urgency, vaginal bleeding, vaginal discharge and vaginal pain.   Musculoskeletal:  Negative for arthralgias, back pain, gait problem, joint swelling, myalgias, neck pain and neck stiffness.   Skin:  Negative for color change, pallor, rash and wound.   Allergic/Immunologic: Negative for environmental allergies, food allergies and immunocompromised state.   Neurological:  Positive for headaches. Negative for dizziness, tremors, seizures, syncope, facial asymmetry, speech difficulty, weakness, light-headedness and numbness.   Hematological:  Negative for adenopathy. Does not bruise/bleed easily.   Psychiatric/Behavioral:  Negative for agitation, behavioral problems, confusion, decreased concentration, dysphoric mood, hallucinations, self-injury, sleep disturbance and suicidal ideas. The patient is not nervous/anxious and is not hyperactive.    Objective:     Vital Signs (Most Recent):  Temp: 98.4 °F (36.9 °C) (07/21/23 2309)  Pulse: 74 (07/21/23 2309)  Resp: 16 (07/21/23 2309)  BP: 136/86 (07/21/23 2309)  SpO2: 97 % (07/21/23 2309) Vital Signs (24h Range):  Temp:  [98.3 °F (36.8 °C)-98.5 °F (36.9 °C)] 98.4 °F (36.9 °C)  Pulse:  [70-75] 74  Resp:  [16-18] 16  SpO2:  [97 %-98 %] 97 %  BP: (136-193)/(85-95) 136/86     Weight: 107 kg (235 lb 14.3 oz)  Body mass index is 33.85 kg/m².     Physical Exam  Constitutional:       General: She is not in acute distress.     Appearance: She is well-developed. She is obese. She is not diaphoretic.   HENT:      Head: Normocephalic and atraumatic.      Right Ear: External ear normal.      Left Ear: External ear normal.      Nose: Nose normal.      Mouth/Throat:      Pharynx: No oropharyngeal exudate.   Eyes:      General: No scleral icterus.     Conjunctiva/sclera: Conjunctivae normal.      Pupils: Pupils are equal,  round, and reactive to light.   Neck:      Thyroid: No thyromegaly.      Vascular: No JVD.      Trachea: No tracheal deviation.   Cardiovascular:      Rate and Rhythm: Normal rate and regular rhythm.      Heart sounds: Normal heart sounds. No murmur heard.    No gallop.      Comments: Positive thrills over left arm AVF above elbow. Site C/D/I.   Pulmonary:      Effort: Pulmonary effort is normal. No respiratory distress.      Breath sounds: Normal breath sounds. No wheezing or rales.   Chest:      Chest wall: No tenderness.   Abdominal:      General: Bowel sounds are normal. There is no distension.      Palpations: Abdomen is soft. There is no mass.      Tenderness: There is no abdominal tenderness. There is no guarding or rebound.   Genitourinary:     Vagina: No vaginal discharge.   Musculoskeletal:         General: No tenderness.      Cervical back: Neck supple.   Lymphadenopathy:      Cervical: No cervical adenopathy.   Skin:     General: Skin is warm and dry.      Coloration: Skin is not pale.      Findings: No erythema or rash.   Neurological:      Mental Status: She is alert and oriented to person, place, and time.      Cranial Nerves: No cranial nerve deficit.      Motor: No abnormal muscle tone.      Coordination: Coordination normal.      Deep Tendon Reflexes: Reflexes are normal and symmetric. Reflexes normal.      Comments: Mild tremors of upper extremities    Psychiatric:         Behavior: Behavior normal.         Thought Content: Thought content normal.         Judgment: Judgment normal.      Comments: Flat affect             CRANIAL NERVES     CN III, IV, VI   Pupils are equal, round, and reactive to light.     Significant Labs: All pertinent labs within the past 24 hours have been reviewed.  Recent Lab Results         07/21/23  2104        Albumin 3.4       Alkaline Phosphatase 103       ALT 11       Anion Gap 12       AST 14       Baso # 0.06       Basophil % 0.6       BILIRUBIN TOTAL 0.4  Comment:  For infants and newborns, interpretation of results should be based  on gestational age, weight and in agreement with clinical  observations.    Premature Infant recommended reference ranges:  Up to 24 hours.............<8.0 mg/dL  Up to 48 hours............<12.0 mg/dL  3-5 days..................<15.0 mg/dL  6-29 days.................<15.0 mg/dL           Comment: Values of less than 100 pg/ml are consistent with non-CHF populations.       BUN 44       Calcium 9.4       Chloride 106       CO2 23       Creatinine 3.7       Differential Method Automated       eGFR 13.3       Eos # 0.1       Eosinophil % 1.2       Glucose 79       Gran # (ANC) 6.8       Gran % 65.5       Hematocrit 32.8       Hemoglobin 10.6       Immature Grans (Abs) 0.06  Comment: Mild elevation in immature granulocytes is non specific and   can be seen in a variety of conditions including stress response,   acute inflammation, trauma and pregnancy. Correlation with other   laboratory and clinical findings is essential.         Immature Granulocytes 0.6       Lymph # 2.6       Lymph % 25.3       MCH 28.1       MCHC 32.3       MCV 87       Mono # 0.7       Mono % 6.8       MPV 10.7       nRBC 0       Platelets 142       Potassium 4.1       PROTEIN TOTAL 7.4       RBC 3.77       RDW 13.7       Sodium 141       Troponin I 0.011  Comment: The reference interval for Troponin I represents the 99th percentile   cutoff   for our facility and is consistent with 3rd generation assay   performance.         WBC 10.41               Significant Imaging: I have reviewed all pertinent imaging results/findings within the past 24 hours.

## 2023-07-22 NOTE — SUBJECTIVE & OBJECTIVE
(Not in a hospital admission)      Review of patient's allergies indicates:   Allergen Reactions    Atorvastatin Other (See Comments)     Statin induced myalgia    Mushroom Hives    Peanut Anaphylaxis    Bactrim [sulfamethoxazole-trimethoprim] Hives    Gabapentin      Pruritis     Iodine     Peanut butter flavor     Penicillins Hives    Shellfish containing products Itching    Sulfa (sulfonamide antibiotics) Hives       Past Medical History:   Diagnosis Date    Acute respiratory failure with hypoxia and hypercapnia 05/26/2019    ATN (acute tubular necrosis) 02/08/2019    Chronic diastolic heart failure 02/07/2019    2-8-19 Mild left atrial enlargement. Mild left ventricular enlargement. Normal left ventricular systolic function. The estimated ejection fraction is 55% Indeterminate left ventricular diastolic function. Normal right ventricular systolic function. Mild mitral regurgitation. Mild to moderate tricuspid regurgitation. The estimated PA systolic pressure is 32 mm Hg Normal central venous pressure (3 m    CKD (chronic kidney disease) stage 4, GFR 15-29 ml/min 05/16/2018    CKD stage G3a/A3, GFR 45-59 and albumin creatinine ratio >300 mg/g 05/16/2018    CKD stage G3b/A3, GFR 30-44 and albumin creatinine ratio >300 mg/g 05/16/2018    Closed compression fracture of L2 lumbar vertebra 05/24/2019    Closed compression fracture of L2 lumbar vertebra 05/24/2019     IMO Regulatory Load October 2019    Controlled type 2 diabetes with retinopathy 04/09/2018    COVID-19 virus infection 7-1-2021 07/01/2021    CVA (cerebral vascular accident) 2017    Diabetic polyneuropathy associated with type 2 diabetes mellitus 12/04/2020    Encounter for blood transfusion     Essential hypertension 04/09/2018    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  7-31-19 Factor XI Activity 55 - 145 % 96       Gastroesophageal reflux disease without esophagitis 05/24/2019    Generalized anxiety disorder 11/08/2019    GERD  (gastroesophageal reflux disease)     GIB (gastrointestinal bleeding) 10/18/2018    History of CVA (cerebrovascular accident) without residual deficits 11/27/2022    History of hepatitis C     s/p succesful Rx w/ [facility]  - SVR12 (cure) 2016    Hypertensive retinopathy, bilateral 12/10/2019    Mild nonproliferative diabetic retinopathy 05/02/2018    Mixed hyperlipidemia 04/09/2018    Myalgia due to statin 09/07/2022    Normocytic anemia 01/14/2022    NS (nuclear sclerosis), bilateral 12/10/2019    PNA (pneumonia) 05/28/2019    Postoperative hypothyroidism     Renovascular hypertension 04/09/2018    S/P kyphoplasty 08/14/2019 8/14/19: L2 kyphoplasty with Dr. Kwon     Slow transit constipation 05/27/2019    Type 2 diabetes mellitus with circulatory disorder, with long-term current use of insulin 04/09/2018    Type 2 diabetes mellitus with stage 3 chronic kidney disease, without long-term current use of insulin 04/09/2018    Type 2 diabetes mellitus with stage 3a chronic kidney disease, without long-term current use of insulin 03/03/2021    Type 2 diabetes mellitus with stage 3b chronic kidney disease, without long-term current use of insulin 09/07/2022     Past Surgical History:   Procedure Laterality Date    AV FISTULA PLACEMENT Left 10/27/2022    Procedure: CREATION, AV FISTULA;  Surgeon: Tirso Tavera MD;  Location: [facility] OR [address] [place] [address];  Service: Peripheral Vascular;  Laterality: Left;  Brachio basilic 1st stage     BACK SURGERY  2017    CHOLECYSTECTOMY  2015    [facility]    COLONOSCOPY  11/13/2015    Dr Chino torres prep. internal hemorrhoids. diverticulosis of sigmois. NO polyps. repeat due 2025    FISTULOGRAM N/A 12/16/2022    Procedure: Fistulogram;  Surgeon: Tirso Tavera MD;  Location: [facility] CATH LAB;  Service: Peripheral Vascular;  Laterality: N/A;    FISTULOGRAM, WITH PTA Left 3/23/2023    Procedure: FISTULOGRAM, WITH PTA;  Surgeon: Tirso Tavera MD;  Location: [facility] OR [address] [place] [address];  Service: Peripheral  Vascular;  Laterality: Left;  LUE TRANSRADIAL CO2 FISTULOGRAM    1.9 min  22.99 mGy  4.3032 Gy.cm  200cc CO2  9ml TransRadial Solution    FIXATION KYPHOPLASTY Bilateral 08/14/2019    Procedure: L2 kyphoplasty Fluoro Globus;  Surgeon: Jeremie Kwon DO;  Location: [facility] OR;  Service: Neurosurgery;  Laterality: Bilateral;  CROW WINSLOW [phone] TEXTED HER @ 10:54AM ON 7-30-19  PRE-OP-BY RN  8-7-2019    HYSTERECTOMY      OOPHORECTOMY  1996    one    PERCUTANEOUS TRANSLUMINAL ANGIOPLASTY OF ARTERIOVENOUS FISTULA Left 12/16/2022    Procedure: PTA, AV FISTULA;  Surgeon: Tirso Tavera MD;  Location: [facility] CATH LAB;  Service: Peripheral Vascular;  Laterality: Left;    RIGHT HEART CATHETERIZATION Right 6/15/2023    Procedure: INSERTION, CATHETER, RIGHT HEART;  Surgeon: Lucretia Rivas MD;  Location: [facility] CATH LAB;  Service: Cardiology;  Laterality: Right;    THYROIDECTOMY  2016    at [facility] for thyroid nodule    TONSILLECTOMY      TRANSPOSITION OF BASILIC VEIN Left 1/31/2023    Procedure: TRANSPOSITION, VEIN, BASILIC;  Surgeon: Tirso Tavera MD;  Location: [facility] OR [address] [place] [address];  Service: Peripheral Vascular;  Laterality: Left;     Family History       Problem Relation (Age of Onset)    Aneurysm Brother    Coronary artery disease Mother    Diabetes type II Mother    Heart disease Mother    Hypertension Mother, Father    Prostate cancer Father    Stroke Mother          Tobacco Use    Smoking status: Never    Smokeless tobacco: Never   Substance and Sexual Activity    Alcohol use: Not Currently    Drug use: No    Sexual activity: Not Currently     Review of Systems   Constitutional:  Negative for chills, fatigue and fever.   Respiratory:  Negative for chest tightness and shortness of breath.    Gastrointestinal:  Negative for nausea and vomiting.   Skin:  Negative for color change.   Neurological:  Negative for weakness and numbness.   Objective:     Vital Signs (Most Recent):  Temp: 98.4 °F (36.9 °C) (07/22/23  0420)  Pulse: 64 (07/22/23 0420)  Resp: 12 (07/22/23 0420)  BP: (!) 144/78 (07/22/23 0420)  SpO2: 97 % (07/22/23 0420) Vital Signs (24h Range):  Temp:  [98.3 °F (36.8 °C)-98.5 °F (36.9 °C)] 98.4 °F (36.9 °C)  Pulse:  [64-75] 64  Resp:  [12-18] 12  SpO2:  [97 %-98 %] 97 %  BP: (136-193)/(78-95) 144/78     Weight: 107 kg (235 lb 14.3 oz)  Body mass index is 33.85 kg/m².      Physical Exam  Constitutional:       Appearance: Normal appearance.   HENT:      Head: Normocephalic and atraumatic.      Mouth/Throat:      Mouth: Mucous membranes are moist.   Eyes:      Pupils: Pupils are equal, round, and reactive to light.   Cardiovascular:      Rate and Rhythm: Normal rate.      Comments: Strong thrill throughout the fistula, no pulsatility appreciated.  2+ radial pulse  5/5 strength in the hand  Pulmonary:      Effort: Pulmonary effort is normal.   Abdominal:      General: Abdomen is flat.      Palpations: Abdomen is soft.   Skin:     General: Skin is warm and dry.      Capillary Refill: Capillary refill takes less than 2 seconds.   Neurological:      General: No focal deficit present.      Mental Status: She is alert.        Significant Labs:  Recent Lab Results         07/22/23  0422   07/22/23  0009   07/21/23  2104        Albumin     3.4       Alkaline Phosphatase     103       ALT     11       Anion Gap 12     12       AST     14       Baso # 0.04     0.06       Basophil % 0.4     0.6       BILIRUBIN TOTAL     0.4  Comment: For infants and newborns, interpretation of results should be based  on gestational age, weight and in agreement with clinical  observations.    Premature Infant recommended reference ranges:  Up to 24 hours.............<8.0 mg/dL  Up to 48 hours............<12.0 mg/dL  3-5 days..................<15.0 mg/dL  6-29 days.................<15.0 mg/dL         BNP     181  Comment: Values of less than 100 pg/ml are consistent with non-CHF populations.       BUN 46     44       Calcium 9.0     9.4        Chloride 109     106       CO2 22     23       Creatinine 3.5     3.7       Differential Method Automated     Automated       eGFR 14.3     13.3       Eos # 0.2     0.1       Eosinophil % 1.8     1.2       Glucose 98     79       Gran # (ANC) 5.2     6.8       Gran % 57.9     65.5       Hematocrit 30.1     32.8       Hemoglobin 9.6     10.6       Immature Grans (Abs) 0.04  Comment: Mild elevation in immature granulocytes is non specific and   can be seen in a variety of conditions including stress response,   acute inflammation, trauma and pregnancy. Correlation with other   laboratory and clinical findings is essential.       0.06  Comment: Mild elevation in immature granulocytes is non specific and   can be seen in a variety of conditions including stress response,   acute inflammation, trauma and pregnancy. Correlation with other   laboratory and clinical findings is essential.         Immature Granulocytes 0.4     0.6       Lymph # 2.8     2.6       Lymph % 31.0     25.3       Magnesium 1.9           MCH 27.7     28.1       MCHC 31.9     32.3       MCV 87     87       Mono # 0.8     0.7       Mono % 8.5     6.8       MPV 10.5     10.7       nRBC 0     0       Phosphorus 4.8           Platelets 125     142       POCT Glucose   81         Potassium 4.1     4.1       PROTEIN TOTAL     7.4       RBC 3.47     3.77       RDW 13.9     13.7       Sodium 143     141       Troponin I     0.011  Comment: The reference interval for Troponin I represents the 99th percentile   cutoff   for our facility and is consistent with 3rd generation assay   performance.         WBC 8.99     10.41               Significant Diagnostics:  I have reviewed all pertinent imaging results/findings within the past 24 hours.

## 2023-07-22 NOTE — CARE UPDATE
Patient seen and examined. VSSAF. Vascular recommending HD trial. Nephrology consulted for HD. Fistula US ordered. CBC without leukocytosis. CMP consistent with ESRD. Patient without complaints.     Stepan Nuñez PA-C  [facility] Medicine  [facility]

## 2023-07-22 NOTE — HPI
Kathie Quezada is a 63-year-old female with a  pmhx of CKD5, CHF, HTN, T2DM, Factor XI deficiency who presented to ED for evaluation of vascular access problem. She had dialysis AVF placed on left arm in March by Dr. Tavera in anticipation of HD.  She went to [facility]  earlier today to start dialysis for the first time.  Dialysis technician was unable to access fistula due to blood clots.  Fistulas located in her left upper arm.  She denies pain, redness or swelling at fistula site. Dialysis technician contacted Dr. Tavera's nurse who advised patient to come to ER for further evaluation and vascular surgery consultation. Patient overall feels tired with intermittent headache and nausea. Denies fever, chills, cough, sob, chest pain, leg swelling, weight gain, vomiting, diarrhea, abdominal pain, dysuria, hematuria, focal weakness or numbness. Patient still makes good bit of urine daily and takes fluid pills for leg swelling.     ED Course: afebrile and vitals stable. K 4.1, Bicarb 23, Bun 44,  Cr 3.7, , Troponin negative. Patient received tylenol and zofran in ED. She is conversant and offers no complaints during my interview. She reports improvement of headache and nausea with tylenol and zofran.

## 2023-07-22 NOTE — MEDICAL/APP STUDENT
History     Chief Complaint   Patient presents with    Vascular Access Problem     Not able to have dialysis today     HPI    Kathie Quezada is a 63-year-old female with a PMHX of CKD5, CHF, HTN, T2DM, Factor XI deficiency who presented to ED for evaluation of vascular access problem. She had dialysis AVF placed on left arm in March by Dr. Tavera in anticipation of HD.    The patient attempted her 1st time dialysis yesterday at [facility], but was told that the fistula site was seated too deep, thus unable to be accessed by the dialysis needle. Dialysis technician contacted vascular nurse at [facility] and the patient was instructed to present at the [facility] ED for evaluation. Additionally, she complains of intermittent distal LT arm paraesthesias since her fistula creation.    Labs    BUN 46 (+/- her baseline)  Cr 3.5 (+/- her baseline)  eGFR 14.3 (+/- her baseline)  PO4- 4.8 (+/- her baseline)  Ca wnl     RBC 3.47 (- her baseline of 4.2)  Hct (+/- her baseline)  MCV wnl  Anemia of CKD picture      PE    Good thrill throughout the fistula indicating adequate flow  2+ LT radial pulse    Plan    Attempt HD access at [facility] while the patient is here; if unsuccessful then dialyze via catheter  If HD unable to access fistula with needle, discuss with Dr. Tavera about potentially raising the fistula closer to the skin surface    Past Medical History:   Diagnosis Date    Acute respiratory failure with hypoxia and hypercapnia 05/26/2019    ATN (acute tubular necrosis) 02/08/2019    Chronic diastolic heart failure 02/07/2019    2-8-19 Mild left atrial enlargement. Mild left ventricular enlargement. Normal left ventricular systolic function. The estimated ejection fraction is 55% Indeterminate left ventricular diastolic function. Normal right ventricular systolic function. Mild mitral regurgitation. Mild to moderate tricuspid regurgitation. The estimated PA systolic pressure is 32 mm Hg Normal central venous pressure (3 m    CKD  (chronic kidney disease) stage 4, GFR 15-29 ml/min 05/16/2018    CKD stage G3a/A3, GFR 45-59 and albumin creatinine ratio >300 mg/g 05/16/2018    CKD stage G3b/A3, GFR 30-44 and albumin creatinine ratio >300 mg/g 05/16/2018    Closed compression fracture of L2 lumbar vertebra 05/24/2019    Closed compression fracture of L2 lumbar vertebra 05/24/2019     IMO Regulatory Load October 2019    Controlled type 2 diabetes with retinopathy 04/09/2018    COVID-19 virus infection 7-1-2021 07/01/2021    CVA (cerebral vascular accident) 2017    Diabetic polyneuropathy associated with type 2 diabetes mellitus 12/04/2020    Encounter for blood transfusion     Essential hypertension 04/09/2018    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  7-31-19 Factor XI Activity 55 - 145 % 96       Gastroesophageal reflux disease without esophagitis 05/24/2019    Generalized anxiety disorder 11/08/2019    GERD (gastroesophageal reflux disease)     GIB (gastrointestinal bleeding) 10/18/2018    History of CVA (cerebrovascular accident) without residual deficits 11/27/2022    History of hepatitis C     s/p succesful Rx w/ [facility]  - [id number] (cure) 2016    Hypertensive retinopathy, bilateral 12/10/2019    Mild nonproliferative diabetic retinopathy 05/02/2018    Mixed hyperlipidemia 04/09/2018    Myalgia due to statin 09/07/2022    Normocytic anemia 01/14/2022    NS (nuclear sclerosis), bilateral 12/10/2019    PNA (pneumonia) 05/28/2019    Postoperative hypothyroidism     Renovascular hypertension 04/09/2018    S/P kyphoplasty 08/14/2019 8/14/19: L2 kyphoplasty with Dr. Kwon     Slow transit constipation 05/27/2019    Type 2 diabetes mellitus with circulatory disorder, with long-term current use of insulin 04/09/2018    Type 2 diabetes mellitus with stage 3 chronic kidney disease, without long-term current use of insulin 04/09/2018    Type 2 diabetes mellitus with stage 3a chronic kidney disease, without long-term current use of  insulin 03/03/2021    Type 2 diabetes mellitus with stage 3b chronic kidney disease, without long-term current use of insulin 09/07/2022       Past Surgical History:   Procedure Laterality Date    AV FISTULA PLACEMENT Left 10/27/2022    Procedure: CREATION, AV FISTULA;  Surgeon: Tirso Tavera MD;  [facility];  Service: Peripheral Vascular;  Laterality: Left;  Brachio basilic 1st stage     BACK SURGERY  2017    CHOLECYSTECTOMY  2015    [facility]    COLONOSCOPY  11/13/2015    Dr Chino torres prep. internal hemorrhoids. diverticulosis of sigmois. NO polyps. repeat due 2025    FISTULOGRAM N/A 12/16/2022    Procedure: Fistulogram;  Surgeon: Tirso Tavera MD;  Location: [facility] CATH LAB;  Service: Peripheral Vascular;  Laterality: N/A;    FISTULOGRAM, WITH PTA Left 3/23/2023    Procedure: FISTULOGRAM, WITH PTA;  Surgeon: Tirso Tavera MD;  Location: [facility] OR [facility];  Service: Peripheral Vascular;  Laterality: Left;  LUE TRANSRADIAL CO2 FISTULOGRAM    1.9 min  22.99 mGy  4.3032 Gy.cm  200cc CO2  9ml TransRadial Solution    FIXATION KYPHOPLASTY Bilateral 08/14/2019    Procedure: L2 kyphoplasty Fluoro Globus;  Surgeon: Jeremie Kwon DO;  Location: [facility] OR;  Service: Neurosurgery;  Laterality: Bilateral;  GLOBUS MACRINA WINSLOW [phone] TEXTED HER @ 10:54AM ON 7-30-19  PRE-OP-BY RN  8-7-2019    HYSTERECTOMY      OOPHORECTOMY  1996    one    PERCUTANEOUS TRANSLUMINAL ANGIOPLASTY OF ARTERIOVENOUS FISTULA Left 12/16/2022    Procedure: PTA, AV FISTULA;  Surgeon: Tirso Tavera MD;  Location: [facility] CATH LAB;  Service: Peripheral Vascular;  Laterality: Left;    RIGHT HEART CATHETERIZATION Right 6/15/2023    Procedure: INSERTION, CATHETER, RIGHT HEART;  Surgeon: Lucretia Rivas MD;  Location: [facility] CATH LAB;  Service: Cardiology;  Laterality: Right;    THYROIDECTOMY  2016    at [facility] for thyroid nodule    TONSILLECTOMY      TRANSPOSITION OF BASILIC VEIN Left 1/31/2023    Procedure: TRANSPOSITION, VEIN, BASILIC;  " Surgeon: Tirso Tavera MD;  Location: [facility] OR [address] [place] [address];  Service: Peripheral Vascular;  Laterality: Left;       Family History   Problem Relation Age of Onset    Stroke Mother     Heart disease Mother     Hypertension Mother     Diabetes type II Mother     Coronary artery disease Mother     Hypertension Father     Prostate cancer Father     Aneurysm Brother        Social History     Tobacco Use    Smoking status: Never    Smokeless tobacco: Never   Substance Use Topics    Alcohol use: Not Currently    Drug use: No       Review of Systems    Physical Exam   BP (!) 144/78 (BP Location: Right arm, Patient Position: Sitting)   Pulse 64   Temp 98.4 °F (36.9 °C) (Oral)   Resp 12   Ht 5' 10" (1.778 m)   Wt 107 kg (235 lb 14.3 oz)   SpO2 97%   BMI 33.85 kg/m²     Physical Exam    ED Course           "

## 2023-07-22 NOTE — ED TRIAGE NOTES
The patient was not able to have dialysis today due to having a vascular access point. The patients last dialysis appointment was Friday, she goes to go [facility]. The patient endorses feeling weak nauseated and having a HA. The patient denies chest pain of SOB.

## 2023-07-23 PROBLEM — N18.6 ESRD (END STAGE RENAL DISEASE): Status: RESOLVED | Noted: 2022-01-14 | Resolved: 2023-07-23

## 2023-07-23 LAB
ANION GAP SERPL CALC-SCNC: 13 MMOL/L (ref 8–16)
BASOPHILS # BLD AUTO: 0.04 K/UL (ref 0–0.2)
BASOPHILS NFR BLD: 0.4 % (ref 0–1.9)
BUN SERPL-MCNC: 55 MG/DL (ref 8–23)
CALCIUM SERPL-MCNC: 9.2 MG/DL (ref 8.7–10.5)
CHLORIDE SERPL-SCNC: 105 MMOL/L (ref 95–110)
CO2 SERPL-SCNC: 20 MMOL/L (ref 23–29)
CREAT SERPL-MCNC: 3.8 MG/DL (ref 0.5–1.4)
DIFFERENTIAL METHOD: ABNORMAL
EOSINOPHIL # BLD AUTO: 0.2 K/UL (ref 0–0.5)
EOSINOPHIL NFR BLD: 1.8 % (ref 0–8)
ERYTHROCYTE [DISTWIDTH] IN BLOOD BY AUTOMATED COUNT: 13.5 % (ref 11.5–14.5)
EST. GFR  (NO RACE VARIABLE): 12.9 ML/MIN/1.73 M^2
GLUCOSE SERPL-MCNC: 88 MG/DL (ref 70–110)
HCT VFR BLD AUTO: 29.6 % (ref 37–48.5)
HGB BLD-MCNC: 10 G/DL (ref 12–16)
IMM GRANULOCYTES # BLD AUTO: 0.06 K/UL (ref 0–0.04)
IMM GRANULOCYTES NFR BLD AUTO: 0.7 % (ref 0–0.5)
LYMPHOCYTES # BLD AUTO: 2.6 K/UL (ref 1–4.8)
LYMPHOCYTES NFR BLD: 28.7 % (ref 18–48)
MAGNESIUM SERPL-MCNC: 1.8 MG/DL (ref 1.6–2.6)
MCH RBC QN AUTO: 28.1 PG (ref 27–31)
MCHC RBC AUTO-ENTMCNC: 33.8 G/DL (ref 32–36)
MCV RBC AUTO: 83 FL (ref 82–98)
MONOCYTES # BLD AUTO: 0.8 K/UL (ref 0.3–1)
MONOCYTES NFR BLD: 8.2 % (ref 4–15)
NEUTROPHILS # BLD AUTO: 5.5 K/UL (ref 1.8–7.7)
NEUTROPHILS NFR BLD: 60.2 % (ref 38–73)
NRBC BLD-RTO: 0 /100 WBC
PHOSPHATE SERPL-MCNC: 4.9 MG/DL (ref 2.7–4.5)
PLATELET # BLD AUTO: ABNORMAL K/UL (ref 150–450)
PMV BLD AUTO: ABNORMAL FL (ref 9.2–12.9)
POCT GLUCOSE: 111 MG/DL (ref 70–110)
POCT GLUCOSE: 112 MG/DL (ref 70–110)
POCT GLUCOSE: 116 MG/DL (ref 70–110)
POCT GLUCOSE: 122 MG/DL (ref 70–110)
POCT GLUCOSE: 94 MG/DL (ref 70–110)
POCT GLUCOSE: 99 MG/DL (ref 70–110)
POTASSIUM SERPL-SCNC: 3.8 MMOL/L (ref 3.5–5.1)
RBC # BLD AUTO: 3.56 M/UL (ref 4–5.4)
SODIUM SERPL-SCNC: 138 MMOL/L (ref 136–145)
WBC # BLD AUTO: 9.13 K/UL (ref 3.9–12.7)

## 2023-07-23 PROCEDURE — 99499 NO LOS: ICD-10-PCS | Mod: HCNC,NTX,, | Performed by: INTERNAL MEDICINE

## 2023-07-23 PROCEDURE — 99499 UNLISTED E&M SERVICE: CPT | Mod: HCNC,NTX,, | Performed by: INTERNAL MEDICINE

## 2023-07-23 PROCEDURE — 96372 THER/PROPH/DIAG INJ SC/IM: CPT | Performed by: HOSPITALIST

## 2023-07-23 PROCEDURE — 25000003 PHARM REV CODE 250: Mod: HCNC,NTX | Performed by: HOSPITALIST

## 2023-07-23 PROCEDURE — 63600175 PHARM REV CODE 636 W HCPCS: Mod: HCNC,NTX | Performed by: HOSPITALIST

## 2023-07-23 PROCEDURE — 85025 COMPLETE CBC W/AUTO DIFF WBC: CPT | Mod: HCNC,NTX | Performed by: HOSPITALIST

## 2023-07-23 PROCEDURE — 80048 BASIC METABOLIC PNL TOTAL CA: CPT | Mod: HCNC,NTX | Performed by: HOSPITALIST

## 2023-07-23 PROCEDURE — 99233 PR SUBSEQUENT HOSPITAL CARE,LEVL III: ICD-10-PCS | Mod: HCNC,NTX,,

## 2023-07-23 PROCEDURE — 84100 ASSAY OF PHOSPHORUS: CPT | Mod: HCNC,NTX | Performed by: HOSPITALIST

## 2023-07-23 PROCEDURE — 99233 SBSQ HOSP IP/OBS HIGH 50: CPT | Mod: HCNC,NTX,,

## 2023-07-23 PROCEDURE — G0378 HOSPITAL OBSERVATION PER HR: HCPCS | Mod: HCNC,NTX

## 2023-07-23 PROCEDURE — 83735 ASSAY OF MAGNESIUM: CPT | Mod: HCNC,NTX | Performed by: HOSPITALIST

## 2023-07-23 PROCEDURE — 36415 COLL VENOUS BLD VENIPUNCTURE: CPT | Mod: HCNC,NTX | Performed by: HOSPITALIST

## 2023-07-23 RX ADMIN — CARVEDILOL 25 MG: 25 TABLET, FILM COATED ORAL at 05:07

## 2023-07-23 RX ADMIN — HEPARIN SODIUM 5000 UNITS: 5000 INJECTION INTRAVENOUS; SUBCUTANEOUS at 05:07

## 2023-07-23 RX ADMIN — ESCITALOPRAM OXALATE 10 MG: 5 TABLET, FILM COATED ORAL at 08:07

## 2023-07-23 RX ADMIN — CHOLECALCIFEROL TAB 125 MCG (5000 UNIT) 5000 UNITS: 125 TAB at 08:07

## 2023-07-23 RX ADMIN — CALCIUM ACETATE 667 MG: 667 CAPSULE ORAL at 08:07

## 2023-07-23 RX ADMIN — CALCIUM ACETATE 667 MG: 667 CAPSULE ORAL at 12:07

## 2023-07-23 RX ADMIN — Medication 324 MG: at 08:07

## 2023-07-23 RX ADMIN — TRAZODONE HYDROCHLORIDE 50 MG: 50 TABLET ORAL at 10:07

## 2023-07-23 RX ADMIN — CALCIUM ACETATE 667 MG: 667 CAPSULE ORAL at 05:07

## 2023-07-23 RX ADMIN — FUROSEMIDE 80 MG: 80 TABLET ORAL at 10:07

## 2023-07-23 RX ADMIN — HEPARIN SODIUM 5000 UNITS: 5000 INJECTION INTRAVENOUS; SUBCUTANEOUS at 10:07

## 2023-07-23 RX ADMIN — FUROSEMIDE 80 MG: 80 TABLET ORAL at 08:07

## 2023-07-23 RX ADMIN — PARICALCITOL 1 MCG: 1 CAPSULE, LIQUID FILLED ORAL at 08:07

## 2023-07-23 RX ADMIN — HEPARIN SODIUM 5000 UNITS: 5000 INJECTION INTRAVENOUS; SUBCUTANEOUS at 03:07

## 2023-07-23 RX ADMIN — CARVEDILOL 25 MG: 25 TABLET, FILM COATED ORAL at 08:07

## 2023-07-23 NOTE — SUBJECTIVE & OBJECTIVE
Interval History: NAEON, hypertensive this AM but improving, other VSSAF. Vascular recommending HD trial. HD trial unsuccessful. US HD access showing limited HD AV fistula evaluation. Vascular to consider possible fistulagram or possible catheter placement for HD access.     Review of Systems   Constitutional:  Negative for chills and fever.   Respiratory:  Negative for chest tightness and shortness of breath.    Cardiovascular:  Negative for chest pain and leg swelling.        Left arm AVF   Gastrointestinal:  Negative for abdominal pain and nausea.   Neurological:  Negative for dizziness and weakness.   Objective:     Vital Signs (Most Recent):  Temp: 97.3 °F (36.3 °C) (07/23/23 1218)  Pulse: 64 (07/23/23 1218)  Resp: 18 (07/23/23 1218)  BP: (!) 151/67 (07/23/23 1218)  SpO2: 99 % (07/23/23 1218) Vital Signs (24h Range):  Temp:  [97.3 °F (36.3 °C)-98.7 °F (37.1 °C)] 97.3 °F (36.3 °C)  Pulse:  [62-75] 64  Resp:  [18] 18  SpO2:  [95 %-99 %] 99 %  BP: (111-194)/(59-91) 151/67     Weight: 110 kg (242 lb 8.1 oz)  Body mass index is 34.8 kg/m².  No intake or output data in the 24 hours ending 07/23/23 1304      Physical Exam  Vitals and nursing note reviewed.   Constitutional:       Appearance: She is well-developed. She is obese.   Eyes:      Pupils: Pupils are equal, round, and reactive to light.   Cardiovascular:      Rate and Rhythm: Normal rate and regular rhythm.      Comments: Left arm AVF, CDI  Pulmonary:      Effort: Pulmonary effort is normal.      Breath sounds: Normal breath sounds.   Abdominal:      Palpations: Abdomen is soft.      Tenderness: There is no abdominal tenderness.   Musculoskeletal:         General: No tenderness.   Skin:     General: Skin is warm and dry.   Neurological:      Mental Status: She is alert and oriented to person, place, and time.   Psychiatric:         Behavior: Behavior normal.           Significant Labs: All pertinent labs within the past 24 hours have been reviewed.  CBC:    Recent Labs   Lab 07/21/23 2104 07/22/23  0422 07/23/23  0312   WBC 10.41 8.99 9.13   HGB 10.6* 9.6* 10.0*   HCT 32.8* 30.1* 29.6*   * 125* SEE COMMENT     CMP:   Recent Labs   Lab 07/21/23 2104 07/22/23 0422 07/23/23 0312    143 138   K 4.1 4.1 3.8    109 105   CO2 23 22* 20*   GLU 79 98 88   BUN 44* 46* 55*   CREATININE 3.7* 3.5* 3.8*   CALCIUM 9.4 9.0 9.2   PROT 7.4  --   --    ALBUMIN 3.4*  --   --    BILITOT 0.4  --   --    ALKPHOS 103  --   --    AST 14  --   --    ALT 11  --   --    ANIONGAP 12 12 13       Significant Imaging: I have reviewed all pertinent imaging results/findings within the past 24 hours.

## 2023-07-23 NOTE — ASSESSMENT & PLAN NOTE
- clotted AVF on left arm which was placed in March by Dr. Tavera in anticipation of HD  - site C/D/I and neurovascular intact left upper extremity   - vascular surgery consulted, recommending HD trial  - nephro consulted, unsuccessful HD yesterday  - US HD access showing limited HD AV fistula evaluation  - vascular to consider possible fistulagram or possible catheter placement for HD access.

## 2023-07-23 NOTE — SUBJECTIVE & OBJECTIVE
Medications:  Continuous Infusions:  Scheduled Meds:   sodium chloride 0.9%   Intravenous Once    [START ON 7/26/2023] sodium chloride 0.9%   Intravenous Once    calcium acetate(phosphat bind)  667 mg Oral TID WM    carvediloL  25 mg Oral BID WM    cholecalciferol (vitamin D3)  5,000 Units Oral Daily    EScitalopram oxalate  10 mg Oral Daily    ferrous gluconate  324 mg Oral Daily with breakfast    furosemide  80 mg Oral BID    heparin (porcine)  5,000 Units Subcutaneous Q8H    insulin aspart U-100  0-5 Units Subcutaneous Q6H    mupirocin   Nasal BID    paricalcitoL  1 mcg Oral Daily     PRN Meds:acetaminophen, cetirizine, glucagon (human recombinant), glucose, glucose, hydrALAZINE, melatonin, naloxone, ondansetron, simethicone, sodium chloride 0.9%, traZODone     Objective:     Vital Signs (Most Recent):  Temp: 98.6 °F (37 °C) (07/25/23 1231)  Pulse: 69 (07/25/23 1318)  Resp: 18 (07/25/23 1231)  BP: (!) 167/77 (07/25/23 1319)  SpO2: 98 % (07/25/23 1231) Vital Signs (24h Range):  Temp:  [97.8 °F (36.6 °C)-98.6 °F (37 °C)] 98.6 °F (37 °C)  Pulse:  [63-72] 69  Resp:  [18] 18  SpO2:  [93 %-100 %] 98 %  BP: (133-187)/(63-83) 167/77          Physical Exam  Constitutional:       Appearance: Normal appearance.   HENT:      Head: Normocephalic and atraumatic.      Mouth/Throat:      Mouth: Mucous membranes are moist.   Eyes:      Pupils: Pupils are equal, round, and reactive to light.   Cardiovascular:      Rate and Rhythm: Normal rate.      Comments: Strong thrill throughout the fistula, no pulsatility appreciated.  2+ radial pulse  5/5 strength in the hand  Pulmonary:      Effort: Pulmonary effort is normal.   Abdominal:      General: Abdomen is flat.      Palpations: Abdomen is soft.   Musculoskeletal:      Comments: Mild swelling at left antecubital fossa. No overlying skin changes.    Skin:     General: Skin is warm and dry.      Capillary Refill: Capillary refill takes less than 2 seconds.   Neurological:       General: No focal deficit present.      Mental Status: She is alert.        Significant Labs:  CBC:   Recent Labs   Lab 07/25/23  1315   WBC 8.73   RBC 3.78*   HGB 10.4*   HCT 32.4*   *   MCV 86   MCH 27.5   MCHC 32.1     CMP:   Recent Labs   Lab 07/25/23  1315   *   CALCIUM 9.4   ALBUMIN 3.2*   PROT 7.2      K 4.5   CO2 21*      BUN 65*   CREATININE 4.3*   ALKPHOS 119   ALT 10   AST 16   BILITOT 0.4       Significant Diagnostics:  I have reviewed all pertinent imaging results/findings within the past 24 hours.

## 2023-07-23 NOTE — ASSESSMENT & PLAN NOTE
- recent echo in February showed diastolic dysfunction and Pulmonary hypertension with PASP 58  - appears euvolemic without signs of ADHF   - continue home lasix bid   - strict I/Os

## 2023-07-23 NOTE — ASSESSMENT & PLAN NOTE
- due to long standing HTN and DM2  - still makes urine daily   - patient went to [facility] earlier today for first session of HD but technician was unable to access AVF due to clot   - no indication for emergent dialysis as K, bicarb and volume status reasonable   - HD trial 7/22 unsuccessful (see principal problem)  - continue home phosphate binders, vitamin D and diuretics

## 2023-07-23 NOTE — HOSPITAL COURSE
Kathie Quezada is placed in  Observation for management of clotted dialysis access. Vascular consulted for fistulagram. Vascular recommending HD trial. HD trial unsuccessful. US HD access showing limited HD AV fistula evaluation. US of graft showing pseudoaneurysm and hematoma, however no concerning findings on exam and symptoms improving. Successful completion of US guided access of HD. In outpatient setting US guided access is not feasible however Nephrology with not recommending further HD at this time and as such Vascular will hold on permacath at this time. She will follow up with Nephrology and Vascular to discuss outpatient fistulagram and superficialization. She will also follow up with PCP. Over hospital course, blood pressure improving after starting nifedipine bid and patient will continue at discharge. Medications sent to preferred pharmacy. Patient medically ready for discharge. Plan of care discussed with patient, patient agreeable to plan, and all questions answered.

## 2023-07-23 NOTE — ASSESSMENT & PLAN NOTE
- due to long standing HTN and DM2  - still makes urine daily   - patient went to [facility] earlier today for first session of HD but technician was unable to access AVF due to clot   - no indication for emergent dialysis as K, bicarb and volume status reasonable   - HD trial 7/22 unsuccessful (see principal problem)  - continue home phosphate binders, vitamin D and diuretics

## 2023-07-23 NOTE — NURSING
REBECCA AVF with positive bruit and thrill.  Unable to successfully cannulate access to start HD.  3 unsuccessful tries without ultrasound machine.  Successful cannulation x1 for an arterial access using ultrasound machine but sluggish flow and blood return contained significant amount of blood clots.  Dr. Myrick notified; ok to hold dialysis tonight until vascular team is able to see pt tomorrow and to treat BP with medications.  Primary RN informed of current issue.

## 2023-07-23 NOTE — PLAN OF CARE
Pt AAOx4, NAD noted. POC reviewed with patient. Questions and concerns answered. Bed locked and in low position, call light and belongings within reach, safety precautions maintained.     Problem: Adult Inpatient Plan of Care  Goal: Plan of Care Review  Outcome: Ongoing, Progressing  Goal: Patient-Specific Goal (Individualized)  Outcome: Ongoing, Progressing  Goal: Absence of Hospital-Acquired Illness or Injury  Outcome: Ongoing, Progressing  Goal: Optimal Comfort and Wellbeing  Outcome: Ongoing, Progressing  Goal: Readiness for Transition of Care  Outcome: Ongoing, Progressing     Problem: Device-Related Complication Risk (Hemodialysis)  Goal: Safe, Effective Therapy Delivery  Outcome: Ongoing, Progressing     Problem: Hemodynamic Instability (Hemodialysis)  Goal: Effective Tissue Perfusion  Outcome: Ongoing, Progressing     Problem: Infection (Hemodialysis)  Goal: Absence of Infection Signs and Symptoms  Outcome: Ongoing, Progressing     Problem: Diabetes Comorbidity  Goal: Blood Glucose Level Within Targeted Range  Outcome: Ongoing, Progressing     Problem: Fall Injury Risk  Goal: Absence of Fall and Fall-Related Injury  Outcome: Ongoing, Progressing

## 2023-07-23 NOTE — ASSESSMENT & PLAN NOTE
63-year-old woman with past medical history significant for end-stage renal disease, previous basilic vein transposition left arm.  Patient went to dialysis and was attempted to be accessed for the 1st time (7/21), however it was noted to be pulling clots and dialysis nurse stated that the fistula appeared to be deep.      HD access ultrasound showed fistula located approximately 0.9 cm deep to the cutaneous surface at the arterial end, 1.2 cm at the midportion, and 2.0 cm at the venous end.     Patient successfully completed US guided access of dialysis yesterday, however had pain and swelling at distal access site. US of graft showed pseudoaneurysm and hematoma. No concerning findings on exam and symptoms have improved.  Nephrology states that US guided access is not feasible in the outpatient setting, but also not recommending further dialysis at this time. As the patient does not need dialysis, can hold off on permacath. Will arrange follow up as an outpatient for fistulagram and superficialization.

## 2023-07-23 NOTE — PROGRESS NOTES
[facility] [address] [place] [address] Medicine  Progress Note    Patient Name: Kathie Quezada  MRN: [id number]  Patient Class: OP- Observation   Admission Date: 7/21/2023  Length of Stay: 0 days  Attending Physician: Elan Alarcon MD  Primary Care Provider: Elan Price MD        Subjective:     Principal Problem:Clotted dialysis access        HPI:  Kathie Quezada is a 63-year-old female with a  pmhx of CKD5, CHF, HTN, T2DM, Factor XI deficiency who presented to ED for evaluation of vascular access problem. She had dialysis AVF placed on left arm in March by Dr. Tavera in anticipation of HD.  She went to [facility]  earlier today to start dialysis for the first time.  Dialysis technician was unable to access fistula due to blood clots.  Fistulas located in her left upper arm.  She denies pain, redness or swelling at fistula site. Dialysis technician contacted Dr. Tavera's nurse who advised patient to come to ER for further evaluation and vascular surgery consultation. Patient overall feels tired with intermittent headache and nausea. Denies fever, chills, cough, sob, chest pain, leg swelling, weight gain, vomiting, diarrhea, abdominal pain, dysuria, hematuria, focal weakness or numbness. Patient still makes good bit of urine daily and takes fluid pills for leg swelling.     ED Course: afebrile and vitals stable. K 4.1, Bicarb 23, Bun 44,  Cr 3.7, , Troponin negative. Patient received tylenol and zofran in ED. She is conversant and offers no complaints during my interview. She reports improvement of headache and nausea with tylenol and zofran.          Overview/Hospital Course:  Kathie Quezada is placed in  Observation for management of clotted dialysis access. Vascular consulted for fistulagram. Vascular recommending HD trial. HD trial unsuccessful. US HD access showing limited HD AV fistula evaluation. Vascular to consider possible fistulagram or possible catheter placement for HD access.        Interval History: NAEON, hypertensive this AM but improving, other VSSAF. Vascular recommending HD trial. HD trial unsuccessful. US HD access showing limited HD AV fistula evaluation. Vascular to consider possible fistulagram or possible catheter placement for HD access.     Review of Systems   Constitutional:  Negative for chills and fever.   Respiratory:  Negative for chest tightness and shortness of breath.    Cardiovascular:  Negative for chest pain and leg swelling.        Left arm AVF   Gastrointestinal:  Negative for abdominal pain and nausea.   Neurological:  Negative for dizziness and weakness.   Objective:     Vital Signs (Most Recent):  Temp: 97.3 °F (36.3 °C) (07/23/23 1218)  Pulse: 64 (07/23/23 1218)  Resp: 18 (07/23/23 1218)  BP: (!) 151/67 (07/23/23 1218)  SpO2: 99 % (07/23/23 1218) Vital Signs (24h Range):  Temp:  [97.3 °F (36.3 °C)-98.7 °F (37.1 °C)] 97.3 °F (36.3 °C)  Pulse:  [62-75] 64  Resp:  [18] 18  SpO2:  [95 %-99 %] 99 %  BP: (111-194)/(59-91) 151/67     Weight: 110 kg (242 lb 8.1 oz)  Body mass index is 34.8 kg/m².  No intake or output data in the 24 hours ending 07/23/23 1304      Physical Exam  Vitals and nursing note reviewed.   Constitutional:       Appearance: She is well-developed. She is obese.   Eyes:      Pupils: Pupils are equal, round, and reactive to light.   Cardiovascular:      Rate and Rhythm: Normal rate and regular rhythm.      Comments: Left arm AVF, CDI  Pulmonary:      Effort: Pulmonary effort is normal.      Breath sounds: Normal breath sounds.   Abdominal:      Palpations: Abdomen is soft.      Tenderness: There is no abdominal tenderness.   Musculoskeletal:         General: No tenderness.   Skin:     General: Skin is warm and dry.   Neurological:      Mental Status: She is alert and oriented to person, place, and time.   Psychiatric:         Behavior: Behavior normal.           Significant Labs: All pertinent labs within the past 24 hours have been  reviewed.  CBC:   Recent Labs   Lab 07/21/23  2104 07/22/23  0422 07/23/23  0312   WBC 10.41 8.99 9.13   HGB 10.6* 9.6* 10.0*   HCT 32.8* 30.1* 29.6*   * 125* SEE COMMENT     CMP:   Recent Labs   Lab 07/21/23  2104 07/22/23  0422 07/23/23  0312    143 138   K 4.1 4.1 3.8    109 105   CO2 23 22* 20*   GLU 79 98 88   BUN 44* 46* 55*   CREATININE 3.7* 3.5* 3.8*   CALCIUM 9.4 9.0 9.2   PROT 7.4  --   --    ALBUMIN 3.4*  --   --    BILITOT 0.4  --   --    ALKPHOS 103  --   --    AST 14  --   --    ALT 11  --   --    ANIONGAP 12 12 13       Significant Imaging: I have reviewed all pertinent imaging results/findings within the past 24 hours.      Assessment/Plan:      * Clotted dialysis access  - clotted AVF on left arm which was placed in March by Dr. Tavera in anticipation of HD  - site C/D/I and neurovascular intact left upper extremity   - vascular surgery consulted, recommending HD trial  - nephro consulted, unsuccessful HD yesterday  - US HD access showing limited HD AV fistula evaluation  - vascular to consider possible fistulagram or possible catheter placement for HD access.     Pulmonary hypertension  - euvolemic w/o signs of acute flare   - continue home diuretics     Metabolic bone disease  - continue vitamin D and phosphate binder    Chronic kidney disease (CKD), stage V  - due to long standing HTN and DM2  - still makes urine daily   - patient went to [facility] earlier today for first session of HD but technician was unable to access AVF due to clot   - no indication for emergent dialysis as K, bicarb and volume status reasonable   - HD trial 7/22 unsuccessful (see principal problem)  - continue home phosphate binders, vitamin D and diuretics    Type 2 diabetes mellitus with stage 5 chronic kidney disease not on chronic dialysis, without long-term current use of insulin  Patient's FSGs are controlled on current medication regimen.  Last A1c reviewed-   Lab Results   Component Value Date     HGBA1C 5.2 02/02/2023     Most recent fingerstick glucose reviewed-   Recent Labs   Lab 07/22/23  1733 07/23/23  0104 07/23/23  0830 07/23/23  1126   POCTGLUCOSE 113* 94 111* 122*     Current correctional scale  Low  Maintain anti-hyperglycemic dose as follows-   Antihyperglycemics (From admission, onward)    Start     Stop Route Frequency Ordered    07/22/23 0600  insulin aspart U-100 pen 0-5 Units         -- SubQ Every 6 hours 07/22/23 0358        Hold Oral hypoglycemics while patient is in the hospital.    Generalized anxiety disorder  - stable and continue home lexapro     Chronic diastolic heart failure  - recent echo in February showed diastolic dysfunction and Pulmonary hypertension with PASP 58  - appears euvolemic without signs of ADHF   - continue home lasix bid   - strict I/Os     Renovascular hypertension  - chronic and controlled   - continue home carvedilol       VTE Risk Mitigation (From admission, onward)         Ordered     heparin (porcine) injection 5,000 Units  Every 8 hours         07/21/23 2355     IP VTE HIGH RISK PATIENT  Once         07/21/23 2355     Place sequential compression device  Until discontinued         07/21/23 2355                Discharge Planning   LINDSEY: 7/24/2023     Code Status: Full Code   Is the patient medically ready for discharge?: No    Reason for patient still in hospital (select all that apply): Patient trending condition, Treatment and Consult recommendations  Discharge Plan A: Home   Discharge Delays: None known at this time              Stepan Nuñez PA-C  Department of Hospital Medicine   [facility] 11[facility]

## 2023-07-23 NOTE — ASSESSMENT & PLAN NOTE
Patient's FSGs are controlled on current medication regimen.  Last A1c reviewed-   Lab Results   Component Value Date    HGBA1C 5.2 02/02/2023     Most recent fingerstick glucose reviewed-   Recent Labs   Lab 07/22/23  1733 07/23/23  0104 07/23/23  0830 07/23/23  1126   POCTGLUCOSE 113* 94 111* 122*     Current correctional scale  Low  Maintain anti-hyperglycemic dose as follows-   Antihyperglycemics (From admission, onward)    Start     Stop Route Frequency Ordered    07/22/23 0600  insulin aspart U-100 pen 0-5 Units         -- SubQ Every 6 hours 07/22/23 0358        Hold Oral hypoglycemics while patient is in the hospital.

## 2023-07-24 LAB
ANION GAP SERPL CALC-SCNC: 11 MMOL/L (ref 8–16)
BASOPHILS # BLD AUTO: 0.05 K/UL (ref 0–0.2)
BASOPHILS NFR BLD: 0.6 % (ref 0–1.9)
BUN SERPL-MCNC: 66 MG/DL (ref 8–23)
CALCIUM SERPL-MCNC: 8.8 MG/DL (ref 8.7–10.5)
CHLORIDE SERPL-SCNC: 104 MMOL/L (ref 95–110)
CO2 SERPL-SCNC: 22 MMOL/L (ref 23–29)
CREAT SERPL-MCNC: 4.5 MG/DL (ref 0.5–1.4)
DIFFERENTIAL METHOD: ABNORMAL
EOSINOPHIL # BLD AUTO: 0.2 K/UL (ref 0–0.5)
EOSINOPHIL NFR BLD: 1.8 % (ref 0–8)
ERYTHROCYTE [DISTWIDTH] IN BLOOD BY AUTOMATED COUNT: 13.7 % (ref 11.5–14.5)
EST. GFR  (NO RACE VARIABLE): 10.5 ML/MIN/1.73 M^2
GLUCOSE SERPL-MCNC: 108 MG/DL (ref 70–110)
HCT VFR BLD AUTO: 29 % (ref 37–48.5)
HGB BLD-MCNC: 9.7 G/DL (ref 12–16)
IMM GRANULOCYTES # BLD AUTO: 0.05 K/UL (ref 0–0.04)
IMM GRANULOCYTES NFR BLD AUTO: 0.6 % (ref 0–0.5)
LYMPHOCYTES # BLD AUTO: 2.3 K/UL (ref 1–4.8)
LYMPHOCYTES NFR BLD: 26.8 % (ref 18–48)
MAGNESIUM SERPL-MCNC: 1.9 MG/DL (ref 1.6–2.6)
MCH RBC QN AUTO: 28.3 PG (ref 27–31)
MCHC RBC AUTO-ENTMCNC: 33.4 G/DL (ref 32–36)
MCV RBC AUTO: 85 FL (ref 82–98)
MONOCYTES # BLD AUTO: 0.8 K/UL (ref 0.3–1)
MONOCYTES NFR BLD: 8.9 % (ref 4–15)
NEUTROPHILS # BLD AUTO: 5.3 K/UL (ref 1.8–7.7)
NEUTROPHILS NFR BLD: 61.3 % (ref 38–73)
NRBC BLD-RTO: 0 /100 WBC
PHOSPHATE SERPL-MCNC: 5.3 MG/DL (ref 2.7–4.5)
PLATELET # BLD AUTO: 94 K/UL (ref 150–450)
PMV BLD AUTO: 11.4 FL (ref 9.2–12.9)
POCT GLUCOSE: 117 MG/DL (ref 70–110)
POCT GLUCOSE: 132 MG/DL (ref 70–110)
POCT GLUCOSE: 84 MG/DL (ref 70–110)
POTASSIUM SERPL-SCNC: 3.8 MMOL/L (ref 3.5–5.1)
RBC # BLD AUTO: 3.43 M/UL (ref 4–5.4)
SODIUM SERPL-SCNC: 137 MMOL/L (ref 136–145)
WBC # BLD AUTO: 8.67 K/UL (ref 3.9–12.7)

## 2023-07-24 PROCEDURE — 90935 PR HEMODIALYSIS, ONE EVALUATION: ICD-10-PCS | Mod: HCNC,NTX,, | Performed by: NURSE PRACTITIONER

## 2023-07-24 PROCEDURE — 90935 HEMODIALYSIS ONE EVALUATION: CPT | Mod: HCNC,NTX,, | Performed by: NURSE PRACTITIONER

## 2023-07-24 PROCEDURE — 80048 BASIC METABOLIC PNL TOTAL CA: CPT | Mod: HCNC,NTX | Performed by: HOSPITALIST

## 2023-07-24 PROCEDURE — 83735 ASSAY OF MAGNESIUM: CPT | Mod: HCNC,NTX | Performed by: HOSPITALIST

## 2023-07-24 PROCEDURE — G0257 UNSCHED DIALYSIS ESRD PT HOS: HCPCS

## 2023-07-24 PROCEDURE — 84100 ASSAY OF PHOSPHORUS: CPT | Mod: HCNC,NTX | Performed by: HOSPITALIST

## 2023-07-24 PROCEDURE — 63600175 PHARM REV CODE 636 W HCPCS: Mod: HCNC,NTX | Performed by: INTERNAL MEDICINE

## 2023-07-24 PROCEDURE — 63600175 PHARM REV CODE 636 W HCPCS: Mod: HCNC,NTX | Performed by: HOSPITALIST

## 2023-07-24 PROCEDURE — 25000003 PHARM REV CODE 250: Mod: HCNC,NTX | Performed by: HOSPITALIST

## 2023-07-24 PROCEDURE — 99233 PR SUBSEQUENT HOSPITAL CARE,LEVL III: ICD-10-PCS | Mod: HCNC,NTX,,

## 2023-07-24 PROCEDURE — 11000001 HC ACUTE MED/SURG PRIVATE ROOM: Mod: HCNC,NTX

## 2023-07-24 PROCEDURE — 36415 COLL VENOUS BLD VENIPUNCTURE: CPT | Mod: HCNC,NTX | Performed by: HOSPITALIST

## 2023-07-24 PROCEDURE — G0378 HOSPITAL OBSERVATION PER HR: HCPCS | Mod: HCNC,NTX

## 2023-07-24 PROCEDURE — 99233 SBSQ HOSP IP/OBS HIGH 50: CPT | Mod: HCNC,NTX,,

## 2023-07-24 PROCEDURE — 96376 TX/PRO/DX INJ SAME DRUG ADON: CPT

## 2023-07-24 PROCEDURE — 90935 HEMODIALYSIS ONE EVALUATION: CPT | Mod: HCNC,NTX

## 2023-07-24 PROCEDURE — 96372 THER/PROPH/DIAG INJ SC/IM: CPT | Performed by: HOSPITALIST

## 2023-07-24 PROCEDURE — 85025 COMPLETE CBC W/AUTO DIFF WBC: CPT | Mod: HCNC,NTX | Performed by: HOSPITALIST

## 2023-07-24 PROCEDURE — 25000003 PHARM REV CODE 250: Mod: HCNC,NTX | Performed by: INTERNAL MEDICINE

## 2023-07-24 RX ORDER — SODIUM CHLORIDE 9 MG/ML
INJECTION, SOLUTION INTRAVENOUS
Status: CANCELLED | OUTPATIENT
Start: 2023-07-24

## 2023-07-24 RX ORDER — SODIUM CHLORIDE 9 MG/ML
INJECTION, SOLUTION INTRAVENOUS ONCE
Status: CANCELLED | OUTPATIENT
Start: 2023-07-24 | End: 2023-07-24

## 2023-07-24 RX ORDER — SODIUM CHLORIDE 9 MG/ML
INJECTION, SOLUTION INTRAVENOUS ONCE
Status: DISCONTINUED | OUTPATIENT
Start: 2023-07-24 | End: 2023-07-26 | Stop reason: HOSPADM

## 2023-07-24 RX ORDER — HEPARIN SODIUM 1000 [USP'U]/ML
1500 INJECTION, SOLUTION INTRAVENOUS; SUBCUTANEOUS ONCE
Status: COMPLETED | OUTPATIENT
Start: 2023-07-24 | End: 2023-07-24

## 2023-07-24 RX ORDER — MUPIROCIN 20 MG/G
OINTMENT TOPICAL 2 TIMES DAILY
Status: DISCONTINUED | OUTPATIENT
Start: 2023-07-24 | End: 2023-07-26 | Stop reason: HOSPADM

## 2023-07-24 RX ADMIN — CARVEDILOL 25 MG: 25 TABLET, FILM COATED ORAL at 08:07

## 2023-07-24 RX ADMIN — TRAZODONE HYDROCHLORIDE 50 MG: 50 TABLET ORAL at 10:07

## 2023-07-24 RX ADMIN — ACETAMINOPHEN 650 MG: 325 TABLET ORAL at 12:07

## 2023-07-24 RX ADMIN — ONDANSETRON 4 MG: 2 INJECTION INTRAMUSCULAR; INTRAVENOUS at 10:07

## 2023-07-24 RX ADMIN — CALCIUM ACETATE 667 MG: 667 CAPSULE ORAL at 05:07

## 2023-07-24 RX ADMIN — CALCIUM ACETATE 667 MG: 667 CAPSULE ORAL at 12:07

## 2023-07-24 RX ADMIN — CARVEDILOL 25 MG: 25 TABLET, FILM COATED ORAL at 05:07

## 2023-07-24 RX ADMIN — ESCITALOPRAM OXALATE 10 MG: 5 TABLET, FILM COATED ORAL at 08:07

## 2023-07-24 RX ADMIN — ACETAMINOPHEN 650 MG: 325 TABLET ORAL at 10:07

## 2023-07-24 RX ADMIN — CALCIUM ACETATE 667 MG: 667 CAPSULE ORAL at 08:07

## 2023-07-24 RX ADMIN — PARICALCITOL 1 MCG: 1 CAPSULE, LIQUID FILLED ORAL at 08:07

## 2023-07-24 RX ADMIN — CHOLECALCIFEROL TAB 125 MCG (5000 UNIT) 5000 UNITS: 125 TAB at 08:07

## 2023-07-24 RX ADMIN — Medication 324 MG: at 08:07

## 2023-07-24 RX ADMIN — HEPARIN SODIUM 5000 UNITS: 5000 INJECTION INTRAVENOUS; SUBCUTANEOUS at 10:07

## 2023-07-24 RX ADMIN — FUROSEMIDE 80 MG: 80 TABLET ORAL at 10:07

## 2023-07-24 RX ADMIN — FUROSEMIDE 80 MG: 80 TABLET ORAL at 08:07

## 2023-07-24 RX ADMIN — HEPARIN SODIUM 1500 UNITS: 1000 INJECTION, SOLUTION INTRAVENOUS; SUBCUTANEOUS at 02:07

## 2023-07-24 RX ADMIN — MUPIROCIN: 20 OINTMENT TOPICAL at 11:07

## 2023-07-24 RX ADMIN — MUPIROCIN: 20 OINTMENT TOPICAL at 10:07

## 2023-07-24 NOTE — NURSING
Pt arrived to [facility] via wheelchair in NAD, VSS, AAOx4. Pt presents for maintenance hemodialysis. Pt was connected to bedside cardiac monitor. Dr. Bernal to bedside with sterile drape to cannulate pt. REBECCA AVF cannulated x2 with 17g needles by Dr. Bernal. Lines connected and secured- tx initiated at 1348 with BFR set at 150 mL/min. Verbal order received from Dr. Bernal for 1,500 unit heparin bolus dose and to increase BFR slowly throughout the tx as pt tolerates. Pt hypertensive, instructed to change machine settings and hold off on administering prn hydralazine unless pt's BP does not improve.

## 2023-07-24 NOTE — ASSESSMENT & PLAN NOTE
- due to long standing HTN and DM2  - still makes urine daily   - patient went to [facility] earlier today for first session of HD but technician was unable to access AVF due to clot   - no indication for emergent dialysis as K, bicarb and volume status reasonable   - HD trial 7/22 unsuccessful (see principal problem)  - continue home phosphate binders, vitamin D and diuretics

## 2023-07-24 NOTE — ASSESSMENT & PLAN NOTE
- clotted AVF on left arm which was placed in March by Dr. Tavera in anticipation of HD  - site C/D/I and neurovascular intact left upper extremity   - vascular surgery consulted, recommending HD trial  - nephro consulted, unsuccessful HD 7/22  - US HD access showing limited HD AV fistula evaluation  - vascular to consider possible fistulagram or possible catheter placement for HD access  - 7/24 - Nephro to re-attempt HD

## 2023-07-24 NOTE — SUBJECTIVE & OBJECTIVE
Interval History: NAEON, hypertensive this AM, otherwise VSSAF. PRN hydralazine for SBP > 180. Patient without complaints. Nephrology following, to continue to attempt prn HD. Vascular to consider fistulagram.     Review of Systems   Constitutional:  Negative for chills and fever.   Respiratory:  Negative for chest tightness and shortness of breath.    Cardiovascular:  Negative for chest pain and leg swelling.        Left arm AVF   Gastrointestinal:  Negative for abdominal pain and nausea.   Neurological:  Negative for dizziness and weakness.   Objective:     Vital Signs (Most Recent):  Temp: 97.7 °F (36.5 °C) (07/24/23 1215)  Pulse: 70 (07/24/23 1215)  Resp: 18 (07/24/23 1215)  BP: (!) 182/83 (07/24/23 1215)  SpO2: (!) 94 % (07/24/23 1215) Vital Signs (24h Range):  Temp:  [97.7 °F (36.5 °C)-98.4 °F (36.9 °C)] 97.7 °F (36.5 °C)  Pulse:  [69-80] 70  Resp:  [17-18] 18  SpO2:  [90 %-98 %] 94 %  BP: (133-182)/(65-83) 182/83     Weight: 110 kg (242 lb 8.1 oz)  Body mass index is 34.8 kg/m².  No intake or output data in the 24 hours ending 07/24/23 1234      Physical Exam  Vitals and nursing note reviewed.   Constitutional:       Appearance: She is well-developed. She is obese.   Eyes:      Pupils: Pupils are equal, round, and reactive to light.   Cardiovascular:      Rate and Rhythm: Normal rate and regular rhythm.      Comments: Left arm AVF, CDI  Pulmonary:      Effort: Pulmonary effort is normal.      Breath sounds: Normal breath sounds.   Abdominal:      Palpations: Abdomen is soft.      Tenderness: There is no abdominal tenderness.   Musculoskeletal:         General: No tenderness.      Right lower leg: No edema.      Left lower leg: No edema.   Skin:     General: Skin is warm and dry.   Neurological:      Mental Status: She is alert and oriented to person, place, and time.   Psychiatric:         Behavior: Behavior normal.           Significant Labs: All pertinent labs within the past 24 hours have been  reviewed.  CBC:   Recent Labs   Lab 07/23/23  0312 07/24/23  0433   WBC 9.13 8.67   HGB 10.0* 9.7*   HCT 29.6* 29.0*   PLT SEE COMMENT 94*     CMP:   Recent Labs   Lab 07/23/23  0312 07/24/23  0433    137   K 3.8 3.8    104   CO2 20* 22*   GLU 88 108   BUN 55* 66*   CREATININE 3.8* 4.5*   CALCIUM 9.2 8.8   ANIONGAP 13 11       Significant Imaging: I have reviewed all pertinent imaging results/findings within the past 24 hours.

## 2023-07-24 NOTE — PLAN OF CARE
Pt AAOx4, NAD noted. Pt updated on POC, questions and concerns answered. Call light and personal belongings within reach, instructed to call for assistance as needed. Bed locked and in lowest position.     Problem: Adult Inpatient Plan of Care  Goal: Plan of Care Review  Outcome: Ongoing, Progressing  Goal: Patient-Specific Goal (Individualized)  Outcome: Ongoing, Progressing  Goal: Absence of Hospital-Acquired Illness or Injury  Outcome: Ongoing, Progressing  Goal: Optimal Comfort and Wellbeing  Outcome: Ongoing, Progressing  Goal: Readiness for Transition of Care  Outcome: Ongoing, Progressing     Problem: Device-Related Complication Risk (Hemodialysis)  Goal: Safe, Effective Therapy Delivery  Outcome: Ongoing, Progressing     Problem: Hemodynamic Instability (Hemodialysis)  Goal: Effective Tissue Perfusion  Outcome: Ongoing, Progressing     Problem: Infection (Hemodialysis)  Goal: Absence of Infection Signs and Symptoms  Outcome: Ongoing, Progressing     Problem: Diabetes Comorbidity  Goal: Blood Glucose Level Within Targeted Range  Outcome: Ongoing, Progressing     Problem: Fall Injury Risk  Goal: Absence of Fall and Fall-Related Injury  Outcome: Ongoing, Progressing

## 2023-07-24 NOTE — PLAN OF CARE
Patient appeared to be able to complete dialysis session today after cannulation under US. If patient can continue to undergo HD with US access, ok to continue to use fistula. If not, patient will need permacath placed. Will arrange follow up in vascular surgery clinic to discuss further superficialization of the fistula.     Breanna Cam MD  General Surgery, PGY-3

## 2023-07-24 NOTE — NURSING
Pt c/o warmth and pain at AV fistula. +bruit and thrill. AV fistula site is warmer than rest of body upon assessment. Notified MD.

## 2023-07-24 NOTE — NURSING
Pt only received 45 min of HD d/t clotting and pt's access swelling upon attempt to rinse back. Unable to return pt's blood d/t clotting. AUBREY Foster NP in [facility] at time of discontinuing tx and made aware of inability to continue HD. Dr. Bernal was notified by charge nurse ALIVIA Zee. Needles pulled and manual pressure held until hemostasis achieved. Pt's BP improved during tx- no need to treat with prn. Unable to increase BFR during tx d/t arterial pressure alarms. Heparin bolus was given early on in tx as ordered by MD.    1515- report given to ALIVIA Neil.    1537- pt off unit via wheelchair with transporter back to inpatient room.

## 2023-07-24 NOTE — PROGRESS NOTES
[facility] HEMODIALYSIS NOTE     Patient currently on hemodialysis for removal of uremic toxins and volume.     Patient seen and evaluated on hemodialysis, tolerating treatment, see HD flowsheet for vitals and assessments.    Labs have been reviewed and the dialysate bath has been adjusted.       Assessment/Plan:    -ESRD on HD   -Patient seen on HD, tolerating treatment well, w/o complaints   -UF goal of 0L   -Cannulated by Dr Bernal with ultrasound guidance   -Renal diet, if not NPO   -Strict I/O's and daily weights  -Daily renal function panels  -Keep MAP >65 while on HD   -Target Hg of 10-12  -Will continue to follow while inpatient       Kumar Foster, NP  Nephrology

## 2023-07-24 NOTE — ASSESSMENT & PLAN NOTE
Patient's FSGs are controlled on current medication regimen.  Last A1c reviewed-   Lab Results   Component Value Date    HGBA1C 5.2 02/02/2023     Most recent fingerstick glucose reviewed-   Recent Labs   Lab 07/23/23  1726 07/23/23  2159 07/24/23  0826 07/24/23  1212   POCTGLUCOSE 116* 112* 117* 84     Current correctional scale  Low  Maintain anti-hyperglycemic dose as follows-   Antihyperglycemics (From admission, onward)    Start     Stop Route Frequency Ordered    07/22/23 0600  insulin aspart U-100 pen 0-5 Units         -- SubQ Every 6 hours 07/22/23 0358        Hold Oral hypoglycemics while patient is in the hospital.

## 2023-07-24 NOTE — PROGRESS NOTES
[facility] [address] [place] [address] Medicine  Progress Note    Patient Name: Kathie Quezada  MRN: [id number]  Patient Class: OP- Observation   Admission Date: 7/21/2023  Length of Stay: 0 days  Attending Physician: Elan Alarcon MD  Primary Care Provider: Elan Price MD        Subjective:     Principal Problem:Clotted dialysis access        HPI:  Kathie Quezada is a 63-year-old female with a  pmhx of CKD5, CHF, HTN, T2DM, Factor XI deficiency who presented to ED for evaluation of vascular access problem. She had dialysis AVF placed on left arm in March by Dr. Tavera in anticipation of HD.  She went to [facility]  earlier today to start dialysis for the first time.  Dialysis technician was unable to access fistula due to blood clots.  Fistulas located in her left upper arm.  She denies pain, redness or swelling at fistula site. Dialysis technician contacted Dr. Tavera's nurse who advised patient to come to ER for further evaluation and vascular surgery consultation. Patient overall feels tired with intermittent headache and nausea. Denies fever, chills, cough, sob, chest pain, leg swelling, weight gain, vomiting, diarrhea, abdominal pain, dysuria, hematuria, focal weakness or numbness. Patient still makes good bit of urine daily and takes fluid pills for leg swelling.     ED Course: afebrile and vitals stable. K 4.1, Bicarb 23, Bun 44,  Cr 3.7, , Troponin negative. Patient received tylenol and zofran in ED. She is conversant and offers no complaints during my interview. She reports improvement of headache and nausea with tylenol and zofran.          Overview/Hospital Course:  Kathie Quezada is placed in  Observation for management of clotted dialysis access. Vascular consulted for fistulagram. Vascular recommending HD trial. HD trial unsuccessful. US HD access showing limited HD AV fistula evaluation. Vascular to consider possible fistulagram or possible catheter placement for HD access.  Nephrology to continue prn HD as needed.      Interval History: NAEON, hypertensive this AM, otherwise VSSAF. PRN hydralazine for SBP > 180. Patient without complaints. Nephrology following, to continue to attempt prn HD. Vascular to consider fistulagram. Patient refusing heparin injections, risks of refusing heparin injections explained to patient who verbalized understanding.     Review of Systems   Constitutional:  Negative for chills and fever.   Respiratory:  Negative for chest tightness and shortness of breath.    Cardiovascular:  Negative for chest pain and leg swelling.        Left arm AVF   Gastrointestinal:  Negative for abdominal pain and nausea.   Neurological:  Negative for dizziness and weakness.   Objective:     Vital Signs (Most Recent):  Temp: 97.7 °F (36.5 °C) (07/24/23 1215)  Pulse: 70 (07/24/23 1215)  Resp: 18 (07/24/23 1215)  BP: (!) 182/83 (07/24/23 1215)  SpO2: (!) 94 % (07/24/23 1215) Vital Signs (24h Range):  Temp:  [97.7 °F (36.5 °C)-98.4 °F (36.9 °C)] 97.7 °F (36.5 °C)  Pulse:  [69-80] 70  Resp:  [17-18] 18  SpO2:  [90 %-98 %] 94 %  BP: (133-182)/(65-83) 182/83     Weight: 110 kg (242 lb 8.1 oz)  Body mass index is 34.8 kg/m².  No intake or output data in the 24 hours ending 07/24/23 1234      Physical Exam  Vitals and nursing note reviewed.   Constitutional:       Appearance: She is well-developed. She is obese.   Eyes:      Pupils: Pupils are equal, round, and reactive to light.   Cardiovascular:      Rate and Rhythm: Normal rate and regular rhythm.      Comments: Left arm AVF, CDI  Pulmonary:      Effort: Pulmonary effort is normal.      Breath sounds: Normal breath sounds.   Abdominal:      Palpations: Abdomen is soft.      Tenderness: There is no abdominal tenderness.   Musculoskeletal:         General: No tenderness.      Right lower leg: No edema.      Left lower leg: No edema.   Skin:     General: Skin is warm and dry.   Neurological:      Mental Status: She is alert and oriented  to person, place, and time.   Psychiatric:         Behavior: Behavior normal.           Significant Labs: All pertinent labs within the past 24 hours have been reviewed.  CBC:   Recent Labs   Lab 07/23/23 0312 07/24/23 0433   WBC 9.13 8.67   HGB 10.0* 9.7*   HCT 29.6* 29.0*   PLT SEE COMMENT 94*     CMP:   Recent Labs   Lab 07/23/23 0312 07/24/23 0433    137   K 3.8 3.8    104   CO2 20* 22*   GLU 88 108   BUN 55* 66*   CREATININE 3.8* 4.5*   CALCIUM 9.2 8.8   ANIONGAP 13 11       Significant Imaging: I have reviewed all pertinent imaging results/findings within the past 24 hours.      Assessment/Plan:      * Clotted dialysis access  - clotted AVF on left arm which was placed in March by Dr. Tavera in anticipation of HD  - site C/D/I and neurovascular intact left upper extremity   - vascular surgery consulted, recommending HD trial  - nephro consulted, unsuccessful HD 7/22  - US HD access showing limited HD AV fistula evaluation  - vascular to consider possible fistulagram or possible catheter placement for HD access  - 7/24 - Nephro to re-attempt HD    Pulmonary hypertension  - euvolemic w/o signs of acute flare   - continue home diuretics     Metabolic bone disease  - continue vitamin D and phosphate binder    Chronic kidney disease (CKD), stage V  - due to long standing HTN and DM2  - still makes urine daily   - patient went to [facility] earlier today for first session of HD but technician was unable to access AVF due to clot   - no indication for emergent dialysis as K, bicarb and volume status reasonable   - HD trial 7/22 unsuccessful (see principal problem)  - continue home phosphate binders, vitamin D and diuretics    Type 2 diabetes mellitus with stage 5 chronic kidney disease not on chronic dialysis, without long-term current use of insulin  Patient's FSGs are controlled on current medication regimen.  Last A1c reviewed-   Lab Results   Component Value Date    HGBA1C 5.2 02/02/2023     Most  recent fingerstick glucose reviewed-   Recent Labs   Lab 07/23/23  1726 07/23/23  2159 07/24/23  0826 07/24/23  1212   POCTGLUCOSE 116* 112* 117* 84     Current correctional scale  Low  Maintain anti-hyperglycemic dose as follows-   Antihyperglycemics (From admission, onward)      Start     Stop Route Frequency Ordered    07/22/23 0600  insulin aspart U-100 pen 0-5 Units         -- SubQ Every 6 hours 07/22/23 0358          Hold Oral hypoglycemics while patient is in the hospital.    Generalized anxiety disorder  - stable and continue home lexapro     Chronic diastolic heart failure  - recent echo in February showed diastolic dysfunction and Pulmonary hypertension with PASP 58  - appears euvolemic without signs of ADHF   - continue home lasix bid   - strict I/Os     Renovascular hypertension  - chronic and controlled   - continue home carvedilol   - prn hydralazine for SBP > 180      VTE Risk Mitigation (From admission, onward)           Ordered     heparin (porcine) injection 5,000 Units  Every 8 hours         07/21/23 2355     IP VTE HIGH RISK PATIENT  Once         07/21/23 2355     Place sequential compression device  Until discontinued         07/21/23 2355                    Discharge Planning   LINDSEY: 7/24/2023     Code Status: Full Code   Is the patient medically ready for discharge?: No    Reason for patient still in hospital (select all that apply): Patient trending condition, Treatment and Consult recommendations  Discharge Plan A: Home   Discharge Delays: None known at this time              Stepan Nuñez PA-C  Department of Hospital Medicine   [facility]

## 2023-07-24 NOTE — NURSING
Pt /83, informed MD of BP. Reported BP to dialysis during report. Instructed by dialysis RN to hold on PRN hydralazine due to pt going to dialysis.

## 2023-07-25 LAB
ALBUMIN SERPL BCP-MCNC: 3.2 G/DL (ref 3.5–5.2)
ALP SERPL-CCNC: 119 U/L (ref 55–135)
ALT SERPL W/O P-5'-P-CCNC: 10 U/L (ref 10–44)
ANION GAP SERPL CALC-SCNC: 13 MMOL/L (ref 8–16)
AST SERPL-CCNC: 16 U/L (ref 10–40)
BASOPHILS # BLD AUTO: 0.06 K/UL (ref 0–0.2)
BASOPHILS NFR BLD: 0.7 % (ref 0–1.9)
BILIRUB SERPL-MCNC: 0.4 MG/DL (ref 0.1–1)
BUN SERPL-MCNC: 65 MG/DL (ref 8–23)
CALCIUM SERPL-MCNC: 9.4 MG/DL (ref 8.7–10.5)
CHLORIDE SERPL-SCNC: 106 MMOL/L (ref 95–110)
CO2 SERPL-SCNC: 21 MMOL/L (ref 23–29)
CREAT SERPL-MCNC: 4.3 MG/DL (ref 0.5–1.4)
DIFFERENTIAL METHOD: ABNORMAL
EOSINOPHIL # BLD AUTO: 0.1 K/UL (ref 0–0.5)
EOSINOPHIL NFR BLD: 1.5 % (ref 0–8)
ERYTHROCYTE [DISTWIDTH] IN BLOOD BY AUTOMATED COUNT: 13.6 % (ref 11.5–14.5)
EST. GFR  (NO RACE VARIABLE): 11.1 ML/MIN/1.73 M^2
GLUCOSE SERPL-MCNC: 126 MG/DL (ref 70–110)
HCT VFR BLD AUTO: 32.4 % (ref 37–48.5)
HGB BLD-MCNC: 10.4 G/DL (ref 12–16)
IMM GRANULOCYTES # BLD AUTO: 0.1 K/UL (ref 0–0.04)
IMM GRANULOCYTES NFR BLD AUTO: 1.1 % (ref 0–0.5)
LYMPHOCYTES # BLD AUTO: 2.2 K/UL (ref 1–4.8)
LYMPHOCYTES NFR BLD: 25.3 % (ref 18–48)
MAGNESIUM SERPL-MCNC: 2 MG/DL (ref 1.6–2.6)
MCH RBC QN AUTO: 27.5 PG (ref 27–31)
MCHC RBC AUTO-ENTMCNC: 32.1 G/DL (ref 32–36)
MCV RBC AUTO: 86 FL (ref 82–98)
MONOCYTES # BLD AUTO: 0.6 K/UL (ref 0.3–1)
MONOCYTES NFR BLD: 6.5 % (ref 4–15)
NEUTROPHILS # BLD AUTO: 5.7 K/UL (ref 1.8–7.7)
NEUTROPHILS NFR BLD: 64.9 % (ref 38–73)
NRBC BLD-RTO: 0 /100 WBC
PHOSPHATE SERPL-MCNC: 4.5 MG/DL (ref 2.7–4.5)
PLATELET # BLD AUTO: 148 K/UL (ref 150–450)
PMV BLD AUTO: 10.1 FL (ref 9.2–12.9)
POCT GLUCOSE: 134 MG/DL (ref 70–110)
POCT GLUCOSE: 138 MG/DL (ref 70–110)
POCT GLUCOSE: 142 MG/DL (ref 70–110)
POCT GLUCOSE: 149 MG/DL (ref 70–110)
POCT GLUCOSE: 89 MG/DL (ref 70–110)
POTASSIUM SERPL-SCNC: 4.5 MMOL/L (ref 3.5–5.1)
PROT SERPL-MCNC: 7.2 G/DL (ref 6–8.4)
RBC # BLD AUTO: 3.78 M/UL (ref 4–5.4)
SODIUM SERPL-SCNC: 140 MMOL/L (ref 136–145)
WBC # BLD AUTO: 8.73 K/UL (ref 3.9–12.7)

## 2023-07-25 PROCEDURE — 83735 ASSAY OF MAGNESIUM: CPT | Mod: HCNC,NTX

## 2023-07-25 PROCEDURE — G0378 HOSPITAL OBSERVATION PER HR: HCPCS | Mod: HCNC,NTX

## 2023-07-25 PROCEDURE — 11000001 HC ACUTE MED/SURG PRIVATE ROOM: Mod: HCNC,NTX

## 2023-07-25 PROCEDURE — 85025 COMPLETE CBC W/AUTO DIFF WBC: CPT | Mod: HCNC,NTX

## 2023-07-25 PROCEDURE — 25000003 PHARM REV CODE 250: Mod: HCNC,NTX

## 2023-07-25 PROCEDURE — 99233 PR SUBSEQUENT HOSPITAL CARE,LEVL III: ICD-10-PCS | Mod: HCNC,NTX,,

## 2023-07-25 PROCEDURE — 25000003 PHARM REV CODE 250: Mod: HCNC,NTX | Performed by: HOSPITALIST

## 2023-07-25 PROCEDURE — 96375 TX/PRO/DX INJ NEW DRUG ADDON: CPT | Mod: NTX

## 2023-07-25 PROCEDURE — 99233 PR SUBSEQUENT HOSPITAL CARE,LEVL III: ICD-10-PCS | Mod: HCNC,NTX,, | Performed by: INTERNAL MEDICINE

## 2023-07-25 PROCEDURE — 36415 COLL VENOUS BLD VENIPUNCTURE: CPT | Mod: HCNC,NTX

## 2023-07-25 PROCEDURE — 63600175 PHARM REV CODE 636 W HCPCS: Mod: HCNC,NTX | Performed by: NURSE PRACTITIONER

## 2023-07-25 PROCEDURE — 99233 SBSQ HOSP IP/OBS HIGH 50: CPT | Mod: HCNC,NTX,,

## 2023-07-25 PROCEDURE — 80053 COMPREHEN METABOLIC PANEL: CPT | Mod: HCNC,NTX

## 2023-07-25 PROCEDURE — 84100 ASSAY OF PHOSPHORUS: CPT | Mod: HCNC,NTX

## 2023-07-25 PROCEDURE — 99233 SBSQ HOSP IP/OBS HIGH 50: CPT | Mod: HCNC,NTX,, | Performed by: INTERNAL MEDICINE

## 2023-07-25 RX ORDER — NIFEDIPINE 30 MG/1
30 TABLET, EXTENDED RELEASE ORAL 2 TIMES DAILY
Status: DISCONTINUED | OUTPATIENT
Start: 2023-07-25 | End: 2023-07-26 | Stop reason: HOSPADM

## 2023-07-25 RX ORDER — SODIUM CHLORIDE 9 MG/ML
INJECTION, SOLUTION INTRAVENOUS ONCE
Status: DISCONTINUED | OUTPATIENT
Start: 2023-07-26 | End: 2023-07-26 | Stop reason: HOSPADM

## 2023-07-25 RX ADMIN — Medication 324 MG: at 08:07

## 2023-07-25 RX ADMIN — HYDRALAZINE HYDROCHLORIDE 10 MG: 20 INJECTION, SOLUTION INTRAMUSCULAR; INTRAVENOUS at 12:07

## 2023-07-25 RX ADMIN — ESCITALOPRAM OXALATE 10 MG: 5 TABLET, FILM COATED ORAL at 08:07

## 2023-07-25 RX ADMIN — FUROSEMIDE 80 MG: 80 TABLET ORAL at 09:07

## 2023-07-25 RX ADMIN — Medication 6 MG: at 09:07

## 2023-07-25 RX ADMIN — CALCIUM ACETATE 667 MG: 667 CAPSULE ORAL at 08:07

## 2023-07-25 RX ADMIN — PARICALCITOL 1 MCG: 1 CAPSULE, LIQUID FILLED ORAL at 08:07

## 2023-07-25 RX ADMIN — NIFEDIPINE 30 MG: 30 TABLET, FILM COATED, EXTENDED RELEASE ORAL at 09:07

## 2023-07-25 RX ADMIN — TRAZODONE HYDROCHLORIDE 50 MG: 50 TABLET ORAL at 09:07

## 2023-07-25 RX ADMIN — CHOLECALCIFEROL TAB 125 MCG (5000 UNIT) 5000 UNITS: 125 TAB at 08:07

## 2023-07-25 RX ADMIN — CARVEDILOL 25 MG: 25 TABLET, FILM COATED ORAL at 08:07

## 2023-07-25 RX ADMIN — MUPIROCIN: 20 OINTMENT TOPICAL at 08:07

## 2023-07-25 RX ADMIN — ACETAMINOPHEN 650 MG: 325 TABLET ORAL at 05:07

## 2023-07-25 RX ADMIN — CARVEDILOL 25 MG: 25 TABLET, FILM COATED ORAL at 05:07

## 2023-07-25 RX ADMIN — MUPIROCIN: 20 OINTMENT TOPICAL at 09:07

## 2023-07-25 RX ADMIN — CALCIUM ACETATE 667 MG: 667 CAPSULE ORAL at 05:07

## 2023-07-25 RX ADMIN — CALCIUM ACETATE 667 MG: 667 CAPSULE ORAL at 12:07

## 2023-07-25 RX ADMIN — FUROSEMIDE 80 MG: 80 TABLET ORAL at 08:07

## 2023-07-25 NOTE — PLAN OF CARE
Problem: Adult Inpatient Plan of Care  Goal: Plan of Care Review  Outcome: Ongoing, Progressing  Goal: Patient-Specific Goal (Individualized)  Outcome: Ongoing, Progressing  Goal: Absence of Hospital-Acquired Illness or Injury  Outcome: Ongoing, Progressing  Goal: Optimal Comfort and Wellbeing  Outcome: Ongoing, Progressing     Problem: Device-Related Complication Risk (Hemodialysis)  Goal: Safe, Effective Therapy Delivery  Outcome: Ongoing, Progressing     Problem: Infection (Hemodialysis)  Goal: Absence of Infection Signs and Symptoms  Outcome: Ongoing, Progressing     Problem: Diabetes Comorbidity  Goal: Blood Glucose Level Within Targeted Range  Outcome: Ongoing, Progressing     Problem: Fall Injury Risk  Goal: Absence of Fall and Fall-Related Injury  Outcome: Ongoing, Progressing

## 2023-07-25 NOTE — ASSESSMENT & PLAN NOTE
- due to long standing HTN and DM2  - still makes urine daily   - patient went to [facility] earlier today for first session of HD but technician was unable to access AVF due to clot   - no indication for emergent dialysis as K, bicarb and volume status reasonable   - HD trial 7/22 unsuccessful (see principal problem)  - continue home phosphate binders, vitamin D and diuretics

## 2023-07-25 NOTE — SUBJECTIVE & OBJECTIVE
Interval History: NAEON, mildly hypertensive improving after prn meds, otherwise VSSAF. Nephrology following, Vascular following. Per Nephro, no acute indication for HD. LUE US pseudoaneurysm from the av fistula with some hematoma, Vascular aware. CBC without leukocytosis. CMP without emergent abnormalities.     Review of Systems   Constitutional:  Negative for chills and fever.   Respiratory:  Negative for chest tightness and shortness of breath.    Cardiovascular:  Negative for chest pain and leg swelling.        Left arm AVF   Gastrointestinal:  Negative for abdominal pain and nausea.   Neurological:  Negative for dizziness and weakness.   Objective:     Vital Signs (Most Recent):  Temp: 98.6 °F (37 °C) (07/25/23 1231)  Pulse: 69 (07/25/23 1318)  Resp: 18 (07/25/23 1231)  BP: (!) 167/77 (07/25/23 1319)  SpO2: 98 % (07/25/23 1231) Vital Signs (24h Range):  Temp:  [97.8 °F (36.6 °C)-98.6 °F (37 °C)] 98.6 °F (37 °C)  Pulse:  [63-72] 69  Resp:  [18] 18  SpO2:  [93 %-100 %] 98 %  BP: (133-187)/(63-83) 167/77     Weight: 110 kg (242 lb 8.1 oz)  Body mass index is 34.8 kg/m².  No intake or output data in the 24 hours ending 07/25/23 1504      Physical Exam  Vitals and nursing note reviewed.   Constitutional:       Appearance: She is well-developed. She is obese.   Eyes:      Pupils: Pupils are equal, round, and reactive to light.   Cardiovascular:      Rate and Rhythm: Normal rate and regular rhythm.      Comments: Left arm AVF, CDI  Pulmonary:      Effort: Pulmonary effort is normal.      Breath sounds: Normal breath sounds.   Abdominal:      Palpations: Abdomen is soft.      Tenderness: There is no abdominal tenderness.   Musculoskeletal:         General: No tenderness.      Right lower leg: No edema.      Left lower leg: No edema.      Comments: Tenderness at needle sites.    Skin:     General: Skin is warm and dry.   Neurological:      Mental Status: She is alert and oriented to person, place, and time.    Psychiatric:         Behavior: Behavior normal.           Significant Labs: All pertinent labs within the past 24 hours have been reviewed.  CBC:   Recent Labs   Lab 07/24/23  0433 07/25/23  1315   WBC 8.67 8.73   HGB 9.7* 10.4*   HCT 29.0* 32.4*   PLT 94* 148*     CMP:   Recent Labs   Lab 07/24/23  0433 07/25/23  1315    140   K 3.8 4.5    106   CO2 22* 21*    126*   BUN 66* 65*   CREATININE 4.5* 4.3*   CALCIUM 8.8 9.4   PROT  --  7.2   ALBUMIN  --  3.2*   BILITOT  --  0.4   ALKPHOS  --  119   AST  --  16   ALT  --  10   ANIONGAP 11 13       Significant Imaging: I have reviewed all pertinent imaging results/findings within the past 24 hours.

## 2023-07-25 NOTE — PROGRESS NOTES
"[facility]  Nephrology  Progress Note    Patient Name: Kathie Quezada  MRN: [id number]  Admission Date: 7/21/2023  Hospital Length of Stay: 1 days  Attending Provider: Elan Alarcon MD   Primary Care Physician: Elan Price MD  Principal Problem:Clotted dialysis access    Subjective:     HPI: The patient is a 63 y.o. Black or  Female with multiple co morbidities including CKD V on HD, CHF, HTN, T2DM, and Factor XI deficiency who presents to ED on 7/21/2023 with complaints of  vascular access problem. Of note, LUE AVF was placed by Dr. Tavera in anticipation of HD with recent fistulogram and PTA on 3/23/23. Patient went to dialysis on 7/21 to initiate HD via access, however, it was noted to be "deep" and pulling clots per patient. She was sent to the ED for evaluation. Vascular was consulted and does not think the access is thrombosed and recommends attempting HD inpatient. She currently denies shortness of breath or other symptoms of fluid overload or uremia.  Labs in the emergency department consistent with ESRD and non emergent. + residual renal function. Admitted for HD and further evaluation by vascular. Fistula US ordered. Nephrology consulted for management of ESRD and HD treatment.      Interval History:     I have seen the patient today and reviewed her clinical and lab data. We were able to get her a short dialysis for  45 min. before her lines got clotted. Her fistulas is mature but is laying deep in most of the middle section.     We would highly recommend getting that fistula superficialized  by the vascular team this admission.     She do not have an acute indication for dialysis. She can wait without a cuffed tunneled catheter and will be followed by the Nephrology team for any urgent need for renal replacement therapy.  If that took place before her fistula become ready to be used the will consider insertion of a cuffed tunneled HD catheter.     -    -        Review " of patient's allergies indicates:   Allergen Reactions    Atorvastatin Other (See Comments)     Statin induced myalgia    Mushroom Hives    Peanut Anaphylaxis    Bactrim [sulfamethoxazole-trimethoprim] Hives    Gabapentin      Pruritis     Iodine     Peanut butter flavor     Penicillins Hives    Shellfish containing products Itching    Sulfa (sulfonamide antibiotics) Hives     Current Facility-Administered Medications   Medication Frequency    0.9%  NaCl infusion Once    [START ON 7/26/2023] 0.9%  NaCl infusion Once    acetaminophen tablet 650 mg Q4H PRN    calcium acetate(phosphat bind) capsule 667 mg TID WM    carvediloL tablet 25 mg BID WM    cetirizine tablet 5 mg Daily PRN    cholecalciferol (vitamin D3) 125 mcg (5,000 unit) tablet 5,000 Units Daily    EScitalopram oxalate tablet 10 mg Daily    ferrous gluconate tablet 324 mg Daily with breakfast    furosemide tablet 80 mg BID    glucagon (human recombinant) injection 1 mg PRN    glucose chewable tablet 16 g PRN    glucose chewable tablet 24 g PRN    heparin (porcine) injection 5,000 Units Q8H    hydrALAZINE injection 10 mg Q8H PRN    insulin aspart U-100 pen 0-5 Units Q6H    melatonin tablet 6 mg Nightly PRN    mupirocin 2 % ointment BID    naloxone 0.4 mg/mL injection 0.02 mg PRN    ondansetron injection 4 mg Q8H PRN    paricalcitoL capsule 1 mcg Daily    simethicone chewable tablet 80 mg QID PRN    sodium chloride 0.9% flush 10 mL Q12H PRN    traZODone tablet 50 mg Nightly PRN       Objective:     Vital Signs (Most Recent):  Temp: 98.6 °F (37 °C) (07/25/23 1231)  Pulse: 69 (07/25/23 1318)  Resp: 18 (07/25/23 1231)  BP: (!) 167/77 (07/25/23 1319)  SpO2: 98 % (07/25/23 1231) Vital Signs (24h Range):  Temp:  [97.8 °F (36.6 °C)-98.6 °F (37 °C)] 98.6 °F (37 °C)  Pulse:  [63-72] 69  Resp:  [18] 18  SpO2:  [93 %-100 %] 98 %  BP: (133-187)/(63-90) 167/77     Weight: 110 kg (242 lb 8.1 oz) (07/22/23 6379)  Body mass index is 34.8 kg/m².  Body surface area is 2.33  meters squared.    I/O last 3 completed shifts:  In: 300 [Other:300]  Out: 312 [Other:312]         Physical Exam  HENT:      Head: Normocephalic.   Eyes:      Pupils: Pupils are equal, round, and reactive to light.   Cardiovascular:      Rate and Rhythm: Normal rate.   Pulmonary:      Effort: Pulmonary effort is normal.   Musculoskeletal:         General: Normal range of motion.      Cervical back: Normal range of motion.      Comments: Mild tenderness at the sights of the needles in her arm.    Neurological:      Mental Status: She is alert.        Significant Labs:  CBC:   Recent Labs   Lab 07/25/23  1315   WBC 8.73   RBC 3.78*   HGB 10.4*   HCT 32.4*   *   MCV 86   MCH 27.5   MCHC 32.1     Coagulation: No results for input(s): PT, INR, APTT in the last 168 hours.  LFTs:   Recent Labs   Lab 07/25/23  1315   ALT 10   AST 16   ALKPHOS 119   BILITOT 0.4   PROT 7.2   ALBUMIN 3.2*          All labs within the past 24 hours have been reviewed.     Significant Imaging:  The fistula doppler was reviewed.     Impression and plan:  CKD stage V with a deep fistula. Will need to superficialize the fistula before being able to be used. There is no acute indication for urgent dialysis. She does not need a cuffed tunneled HD catheter within the current admission.      HTN will need close monitoring.     DM II. Will require regular checking.     Post OP Hypothyroidism.     Factor  XI deficiency.     Inflammatory anemia. Will need to keep her target at 10-12 gm/dl.       JAZ SHAW.Anel. MD. MIGUELITO. FACP.  , [employer] / [employer]  Nephrology Consultant. [facility].   [address] [place] [address].    email: [email or web address]  Tel: Office: [phone]                 Assessment/Plan:     No new Assessment & Plan notes have been filed under this hospital service since the last note was  generated.  Service: Nephrology

## 2023-07-25 NOTE — PROGRESS NOTES
[facility] [address] [place] [address] Medicine  Progress Note    Patient Name: Kathie Quezada  MRN: [id number]  Patient Class: IP- Inpatient   Admission Date: 7/21/2023  Length of Stay: 1 days  Attending Physician: Elan Alarcon MD  Primary Care Provider: Elan Price MD        Subjective:     Principal Problem:Clotted dialysis access        HPI:  Kathie Quezada is a 63-year-old female with a  pmhx of CKD5, CHF, HTN, T2DM, Factor XI deficiency who presented to ED for evaluation of vascular access problem. She had dialysis AVF placed on left arm in March by Dr. Tavera in anticipation of HD.  She went to [facility]  earlier today to start dialysis for the first time.  Dialysis technician was unable to access fistula due to blood clots.  Fistulas located in her left upper arm.  She denies pain, redness or swelling at fistula site. Dialysis technician contacted Dr. Tavera's nurse who advised patient to come to ER for further evaluation and vascular surgery consultation. Patient overall feels tired with intermittent headache and nausea. Denies fever, chills, cough, sob, chest pain, leg swelling, weight gain, vomiting, diarrhea, abdominal pain, dysuria, hematuria, focal weakness or numbness. Patient still makes good bit of urine daily and takes fluid pills for leg swelling.     ED Course: afebrile and vitals stable. K 4.1, Bicarb 23, Bun 44,  Cr 3.7, , Troponin negative. Patient received tylenol and zofran in ED. She is conversant and offers no complaints during my interview. She reports improvement of headache and nausea with tylenol and zofran.          Overview/Hospital Course:  Kathie Quezada is placed in  Observation for management of clotted dialysis access. Vascular consulted for fistulagram. Vascular recommending HD trial. HD trial unsuccessful. US HD access showing limited HD AV fistula evaluation. Vascular to consider possible fistulagram or possible catheter placement for HD access. Nephrology  to continue prn HD as needed.      Interval History: NAEON, mildly hypertensive improving after prn meds, otherwise VSSAF. Nephrology following, Vascular following. Per Nephro, no acute indication for HD. LUE US pseudoaneurysm from the av fistula with some hematoma, Vascular aware. CBC without leukocytosis. CMP consistent with CKD.     Review of Systems   Constitutional:  Negative for chills and fever.   Respiratory:  Negative for chest tightness and shortness of breath.    Cardiovascular:  Negative for chest pain and leg swelling.        Left arm AVF   Gastrointestinal:  Negative for abdominal pain and nausea.   Neurological:  Negative for dizziness and weakness.   Objective:     Vital Signs (Most Recent):  Temp: 98.6 °F (37 °C) (07/25/23 1231)  Pulse: 69 (07/25/23 1318)  Resp: 18 (07/25/23 1231)  BP: (!) 167/77 (07/25/23 1319)  SpO2: 98 % (07/25/23 1231) Vital Signs (24h Range):  Temp:  [97.8 °F (36.6 °C)-98.6 °F (37 °C)] 98.6 °F (37 °C)  Pulse:  [63-72] 69  Resp:  [18] 18  SpO2:  [93 %-100 %] 98 %  BP: (133-187)/(63-83) 167/77     Weight: 110 kg (242 lb 8.1 oz)  Body mass index is 34.8 kg/m².  No intake or output data in the 24 hours ending 07/25/23 1504      Physical Exam  Vitals and nursing note reviewed.   Constitutional:       Appearance: She is well-developed. She is obese.   Eyes:      Pupils: Pupils are equal, round, and reactive to light.   Cardiovascular:      Rate and Rhythm: Normal rate and regular rhythm.      Comments: Left arm AVF, CDI  Pulmonary:      Effort: Pulmonary effort is normal.      Breath sounds: Normal breath sounds.   Abdominal:      Palpations: Abdomen is soft.      Tenderness: There is no abdominal tenderness.   Musculoskeletal:         General: No tenderness.      Right lower leg: No edema.      Left lower leg: No edema.      Comments: Tenderness at needle sites.    Skin:     General: Skin is warm and dry.   Neurological:      Mental Status: She is alert and oriented to person,  place, and time.   Psychiatric:         Behavior: Behavior normal.           Significant Labs: All pertinent labs within the past 24 hours have been reviewed.  CBC:   Recent Labs   Lab 07/24/23  0433 07/25/23  1315   WBC 8.67 8.73   HGB 9.7* 10.4*   HCT 29.0* 32.4*   PLT 94* 148*     CMP:   Recent Labs   Lab 07/24/23  0433 07/25/23  1315    140   K 3.8 4.5    106   CO2 22* 21*    126*   BUN 66* 65*   CREATININE 4.5* 4.3*   CALCIUM 8.8 9.4   PROT  --  7.2   ALBUMIN  --  3.2*   BILITOT  --  0.4   ALKPHOS  --  119   AST  --  16   ALT  --  10   ANIONGAP 11 13       Significant Imaging: I have reviewed all pertinent imaging results/findings within the past 24 hours.      Assessment/Plan:      * Clotted dialysis access  - clotted AVF on left arm which was placed in March by Dr. Tavera in anticipation of HD  - site C/D/I and neurovascular intact left upper extremity   - vascular surgery consulted, recommending HD trial  - nephro consulted, unsuccessful HD 7/22  - US HD access showing limited HD AV fistula evaluation  - vascular to consider possible fistulagram or possible catheter placement for HD access  - 7/24 - Nephro to re-attempt HD, successful for only 45 min  - LUE US with pseudoaneurysm from the av fistula with some hematoma, Vascular aware  - Per Nephro, acute indication for HD    Pulmonary hypertension  - euvolemic w/o signs of acute flare   - continue home diuretics     Metabolic bone disease  - continue vitamin D and phosphate binder    Chronic kidney disease (CKD), stage V  - due to long standing HTN and DM2  - still makes urine daily   - patient went to [facility] earlier today for first session of HD but technician was unable to access AVF due to clot   - no indication for emergent dialysis as K, bicarb and volume status reasonable   - HD trial 7/22 unsuccessful (see principal problem)  - continue home phosphate binders, vitamin D and diuretics    Type 2 diabetes mellitus with stage 5 chronic  kidney disease not on chronic dialysis, without long-term current use of insulin  Patient's FSGs are controlled on current medication regimen.  Last A1c reviewed-   Lab Results   Component Value Date    HGBA1C 5.2 02/02/2023     Most recent fingerstick glucose reviewed-   Recent Labs   Lab 07/24/23  1709 07/25/23  0012 07/25/23  0526 07/25/23  1208   POCTGLUCOSE 132* 134* 149* 142*     Current correctional scale  Low  Maintain anti-hyperglycemic dose as follows-   Antihyperglycemics (From admission, onward)      Start     Stop Route Frequency Ordered    07/22/23 0600  insulin aspart U-100 pen 0-5 Units         -- SubQ Every 6 hours 07/22/23 0358          Hold Oral hypoglycemics while patient is in the hospital.    Generalized anxiety disorder  - stable and continue home lexapro     Chronic diastolic heart failure  - recent echo in February showed diastolic dysfunction and Pulmonary hypertension with PASP 58  - appears euvolemic without signs of ADHF   - continue home lasix bid   - strict I/Os     Renovascular hypertension  - chronic and controlled   - continue home carvedilol   - prn hydralazine for SBP > 180      VTE Risk Mitigation (From admission, onward)           Ordered     heparin (porcine) injection 5,000 Units  Every 8 hours         07/21/23 2355     IP VTE HIGH RISK PATIENT  Once         07/21/23 2355     Place sequential compression device  Until discontinued         07/21/23 2355                    Discharge Planning   LINDSEY: 7/26/2023     Code Status: Full Code   Is the patient medically ready for discharge?: No    Reason for patient still in hospital (select all that apply): Patient trending condition, Treatment and Consult recommendations  Discharge Plan A: Home   Discharge Delays: None known at this time              Stepan Nuñez PA-C  Department of Hospital Medicine   [facility]

## 2023-07-25 NOTE — ASSESSMENT & PLAN NOTE
- clotted AVF on left arm which was placed in March by Dr. Tavera in anticipation of HD  - site C/D/I and neurovascular intact left upper extremity   - vascular surgery consulted, recommending HD trial  - nephro consulted, unsuccessful HD 7/22  - US HD access showing limited HD AV fistula evaluation  - vascular to consider possible fistulagram or possible catheter placement for HD access  - 7/24 - Nephro to re-attempt HD, successful for only 45 min  - LUE US with pseudoaneurysm from the av fistula with some hematoma, Vascular aware  - Per Nephro, acute indication for HD

## 2023-07-25 NOTE — ASSESSMENT & PLAN NOTE
Patient's FSGs are controlled on current medication regimen.  Last A1c reviewed-   Lab Results   Component Value Date    HGBA1C 5.2 02/02/2023     Most recent fingerstick glucose reviewed-   Recent Labs   Lab 07/24/23  1709 07/25/23  0012 07/25/23  0526 07/25/23  1208   POCTGLUCOSE 132* 134* 149* 142*     Current correctional scale  Low  Maintain anti-hyperglycemic dose as follows-   Antihyperglycemics (From admission, onward)    Start     Stop Route Frequency Ordered    07/22/23 0600  insulin aspart U-100 pen 0-5 Units         -- SubQ Every 6 hours 07/22/23 0358        Hold Oral hypoglycemics while patient is in the hospital.

## 2023-07-25 NOTE — PLAN OF CARE
Pt AAOx4, NAD noted. Updated on POC, questions and concerns answered adequately. Personal belongings at bedside, call light within reach, side rails up, bed locked and lowered to lowest position. Instructed to call for assistance.     Problem: Adult Inpatient Plan of Care  Goal: Plan of Care Review  Outcome: Ongoing, Progressing  Goal: Patient-Specific Goal (Individualized)  Outcome: Ongoing, Progressing  Goal: Absence of Hospital-Acquired Illness or Injury  Outcome: Ongoing, Progressing  Goal: Optimal Comfort and Wellbeing  Outcome: Ongoing, Progressing  Goal: Readiness for Transition of Care  Outcome: Ongoing, Progressing     Problem: Device-Related Complication Risk (Hemodialysis)  Goal: Safe, Effective Therapy Delivery  Outcome: Ongoing, Progressing     Problem: Hemodynamic Instability (Hemodialysis)  Goal: Effective Tissue Perfusion  Outcome: Ongoing, Progressing     Problem: Infection (Hemodialysis)  Goal: Absence of Infection Signs and Symptoms  Outcome: Ongoing, Progressing     Problem: Diabetes Comorbidity  Goal: Blood Glucose Level Within Targeted Range  Outcome: Ongoing, Progressing     Problem: Fall Injury Risk  Goal: Absence of Fall and Fall-Related Injury  Outcome: Ongoing, Progressing

## 2023-07-25 NOTE — SUBJECTIVE & OBJECTIVE
Interval History:     I have seen the patient today and reviewed her clinical and lab data. We were able to get her a short dialysis for  45 min. before her lines got clotted. Her fistulas is mature but is laying deep in most of the middle section.     We would highly recommend getting that fistula superficialized  by the vascular team this admission.     She do not have an acute indication for dialysis. She can wait without a cuffed tunneled catheter and will be followed by the Nephrology team for any urgent need for renal replacement therapy.  If that took place before her fistula become ready to be used the will consider insertion of a cuffed tunneled HD catheter.                 Review of patient's allergies indicates:   Allergen Reactions    Atorvastatin Other (See Comments)     Statin induced myalgia    Mushroom Hives    Peanut Anaphylaxis    Bactrim [sulfamethoxazole-trimethoprim] Hives    Gabapentin      Pruritis     Iodine     Peanut butter flavor     Penicillins Hives    Shellfish containing products Itching    Sulfa (sulfonamide antibiotics) Hives     Current Facility-Administered Medications   Medication Frequency    0.9%  NaCl infusion Once    [START ON 7/26/2023] 0.9%  NaCl infusion Once    acetaminophen tablet 650 mg Q4H PRN    calcium acetate(phosphat bind) capsule 667 mg TID WM    carvediloL tablet 25 mg BID WM    cetirizine tablet 5 mg Daily PRN    cholecalciferol (vitamin D3) 125 mcg (5,000 unit) tablet 5,000 Units Daily    EScitalopram oxalate tablet 10 mg Daily    ferrous gluconate tablet 324 mg Daily with breakfast    furosemide tablet 80 mg BID    glucagon (human recombinant) injection 1 mg PRN    glucose chewable tablet 16 g PRN    glucose chewable tablet 24 g PRN    heparin (porcine) injection 5,000 Units Q8H    hydrALAZINE injection 10 mg Q8H PRN    insulin aspart U-100 pen 0-5 Units Q6H    melatonin tablet 6 mg Nightly PRN    mupirocin 2 % ointment BID    naloxone 0.4 mg/mL injection 0.02  mg PRN    ondansetron injection 4 mg Q8H PRN    paricalcitoL capsule 1 mcg Daily    simethicone chewable tablet 80 mg QID PRN    sodium chloride 0.9% flush 10 mL Q12H PRN    traZODone tablet 50 mg Nightly PRN       Objective:     Vital Signs (Most Recent):  Temp: 98.6 °F (37 °C) (07/25/23 1231)  Pulse: 69 (07/25/23 1318)  Resp: 18 (07/25/23 1231)  BP: (!) 167/77 (07/25/23 1319)  SpO2: 98 % (07/25/23 1231) Vital Signs (24h Range):  Temp:  [97.8 °F (36.6 °C)-98.6 °F (37 °C)] 98.6 °F (37 °C)  Pulse:  [63-72] 69  Resp:  [18] 18  SpO2:  [93 %-100 %] 98 %  BP: (133-187)/(63-90) 167/77     Weight: 110 kg (242 lb 8.1 oz) (07/22/23 2259)  Body mass index is 34.8 kg/m².  Body surface area is 2.33 meters squared.    I/O last 3 completed shifts:  In: 300 [Other:300]  Out: 312 [Other:312]         Physical Exam  HENT:      Head: Normocephalic.   Eyes:      Pupils: Pupils are equal, round, and reactive to light.   Cardiovascular:      Rate and Rhythm: Normal rate.   Pulmonary:      Effort: Pulmonary effort is normal.   Musculoskeletal:         General: Normal range of motion.      Cervical back: Normal range of motion.      Comments: Mild tenderness at the sights of the needles in her arm.    Neurological:      Mental Status: She is alert.        Significant Labs:  CBC:   Recent Labs   Lab 07/25/23  1315   WBC 8.73   RBC 3.78*   HGB 10.4*   HCT 32.4*   *   MCV 86   MCH 27.5   MCHC 32.1     Coagulation: No results for input(s): PT, INR, APTT in the last 168 hours.  LFTs:   Recent Labs   Lab 07/25/23  1315   ALT 10   AST 16   ALKPHOS 119   BILITOT 0.4   PROT 7.2   ALBUMIN 3.2*          All labs within the past 24 hours have been reviewed.     Significant Imaging:  The fistula doppler was reviewed.     Impression and plan:  CKD stage V with a deep fistula. Will need to superficialize the fistula before being able to be used. There is no acute indication for urgent dialysis. She does not need a cuffed tunneled HD catheter  within the current admission.      HTN will need close monitoring.     DM II. Will require regular checking.     Post OP Hypothyroidism.     Factor  XI deficiency.     Inflammatory anemia. Will need to keep her target at 10-12 gm/dl.       JAZ SHAW.Anel. MD. MIGUELITO. ZAYP.  , [employer] / [employer] ([place]  Nephrology Consultant. [facility].   [address] [place] [address]. [address]th [place] [address].    email: [email or web address].  Tel: Office: [phone]

## 2023-07-25 NOTE — PROGRESS NOTES
[facility]  Vascular Surgery  Progress Note    Patient Name: Kathie Quezada  MRN: [id number]  Admission Date: 7/21/2023  Primary Care Provider: Elan Price MD    Subjective:     Interval History: Successfully able to dialyze yesterday. Pain and swelling at distal access site. Improved pain and swelling today on exam. HDS on RA.     Post-Op Info:  * No surgery found *           Medications:  Continuous Infusions:  Scheduled Meds:   sodium chloride 0.9%   Intravenous Once    [START ON 7/26/2023] sodium chloride 0.9%   Intravenous Once    calcium acetate(phosphat bind)  667 mg Oral TID WM    carvediloL  25 mg Oral BID WM    cholecalciferol (vitamin D3)  5,000 Units Oral Daily    EScitalopram oxalate  10 mg Oral Daily    ferrous gluconate  324 mg Oral Daily with breakfast    furosemide  80 mg Oral BID    heparin (porcine)  5,000 Units Subcutaneous Q8H    insulin aspart U-100  0-5 Units Subcutaneous Q6H    mupirocin   Nasal BID    paricalcitoL  1 mcg Oral Daily     PRN Meds:acetaminophen, cetirizine, glucagon (human recombinant), glucose, glucose, hydrALAZINE, melatonin, naloxone, ondansetron, simethicone, sodium chloride 0.9%, traZODone     Objective:     Vital Signs (Most Recent):  Temp: 98.6 °F (37 °C) (07/25/23 1231)  Pulse: 69 (07/25/23 1318)  Resp: 18 (07/25/23 1231)  BP: (!) 167/77 (07/25/23 1319)  SpO2: 98 % (07/25/23 1231) Vital Signs (24h Range):  Temp:  [97.8 °F (36.6 °C)-98.6 °F (37 °C)] 98.6 °F (37 °C)  Pulse:  [63-72] 69  Resp:  [18] 18  SpO2:  [93 %-100 %] 98 %  BP: (133-187)/(63-83) 167/77          Physical Exam  Constitutional:       Appearance: Normal appearance.   HENT:      Head: Normocephalic and atraumatic.      Mouth/Throat:      Mouth: Mucous membranes are moist.   Eyes:      Pupils: Pupils are equal, round, and reactive to light.   Cardiovascular:      Rate and Rhythm: Normal rate.      Comments: Strong thrill throughout the fistula, no pulsatility appreciated.  2+ radial  pulse  5/5 strength in the hand  Pulmonary:      Effort: Pulmonary effort is normal.   Abdominal:      General: Abdomen is flat.      Palpations: Abdomen is soft.   Musculoskeletal:      Comments: Mild swelling at left antecubital fossa. No overlying skin changes.    Skin:     General: Skin is warm and dry.      Capillary Refill: Capillary refill takes less than 2 seconds.   Neurological:      General: No focal deficit present.      Mental Status: She is alert.        Significant Labs:  CBC:   Recent Labs   Lab 07/25/23  1315   WBC 8.73   RBC 3.78*   HGB 10.4*   HCT 32.4*   *   MCV 86   MCH 27.5   MCHC 32.1     CMP:   Recent Labs   Lab 07/25/23  1315   *   CALCIUM 9.4   ALBUMIN 3.2*   PROT 7.2      K 4.5   CO2 21*      BUN 65*   CREATININE 4.3*   ALKPHOS 119   ALT 10   AST 16   BILITOT 0.4       Significant Diagnostics:  I have reviewed all pertinent imaging results/findings within the past 24 hours.    Assessment/Plan:     * Clotted dialysis access  63-year-old woman with past medical history significant for end-stage renal disease, previous basilic vein transposition left arm.  Patient went to dialysis and was attempted to be accessed for the 1st time (7/21), however it was noted to be pulling clots and dialysis nurse stated that the fistula appeared to be deep.      HD access ultrasound showed fistula located approximately 0.9 cm deep to the cutaneous surface at the arterial end, 1.2 cm at the midportion, and 2.0 cm at the venous end.     Patient successfully completed US guided access of dialysis yesterday, however had pain and swelling at distal access site. US of graft showed pseudoaneurysm and hematoma. No concerning findings on exam and symptoms have improved.  Nephrology states that US guided access is not feasible in the outpatient setting, but also not recommending further dialysis at this time. As the patient does not need dialysis, can hold off on permacath. Will arrange follow  up as an outpatient for fistulagram and superficialization.         Vascular surgery will sign off at this time, thank you for including us in this patients care. Please call with questions/concerns    Breanna Cam MD  Vascular Surgery  [facility]

## 2023-07-26 VITALS
SYSTOLIC BLOOD PRESSURE: 125 MMHG | BODY MASS INDEX: 34.72 KG/M2 | WEIGHT: 242.5 LBS | DIASTOLIC BLOOD PRESSURE: 68 MMHG | HEIGHT: 70 IN | OXYGEN SATURATION: 96 % | HEART RATE: 68 BPM | RESPIRATION RATE: 17 BRPM | TEMPERATURE: 98 F

## 2023-07-26 DIAGNOSIS — N18.6 END STAGE RENAL FAILURE ON DIALYSIS: Primary | ICD-10-CM

## 2023-07-26 DIAGNOSIS — Z99.2 END STAGE RENAL FAILURE ON DIALYSIS: Primary | ICD-10-CM

## 2023-07-26 LAB
ALBUMIN SERPL BCP-MCNC: 3 G/DL (ref 3.5–5.2)
ALP SERPL-CCNC: 96 U/L (ref 55–135)
ALT SERPL W/O P-5'-P-CCNC: 9 U/L (ref 10–44)
ANION GAP SERPL CALC-SCNC: 13 MMOL/L (ref 8–16)
AST SERPL-CCNC: 14 U/L (ref 10–40)
BASOPHILS # BLD AUTO: 0.05 K/UL (ref 0–0.2)
BASOPHILS NFR BLD: 0.5 % (ref 0–1.9)
BILIRUB SERPL-MCNC: 0.3 MG/DL (ref 0.1–1)
BUN SERPL-MCNC: 69 MG/DL (ref 8–23)
CALCIUM SERPL-MCNC: 9.3 MG/DL (ref 8.7–10.5)
CHLORIDE SERPL-SCNC: 106 MMOL/L (ref 95–110)
CO2 SERPL-SCNC: 21 MMOL/L (ref 23–29)
CREAT SERPL-MCNC: 4.5 MG/DL (ref 0.5–1.4)
DIFFERENTIAL METHOD: ABNORMAL
EOSINOPHIL # BLD AUTO: 0.2 K/UL (ref 0–0.5)
EOSINOPHIL NFR BLD: 1.9 % (ref 0–8)
ERYTHROCYTE [DISTWIDTH] IN BLOOD BY AUTOMATED COUNT: 13.8 % (ref 11.5–14.5)
EST. GFR  (NO RACE VARIABLE): 10.5 ML/MIN/1.73 M^2
GLUCOSE SERPL-MCNC: 100 MG/DL (ref 70–110)
HCT VFR BLD AUTO: 28.7 % (ref 37–48.5)
HGB BLD-MCNC: 9.6 G/DL (ref 12–16)
IMM GRANULOCYTES # BLD AUTO: 0.06 K/UL (ref 0–0.04)
IMM GRANULOCYTES NFR BLD AUTO: 0.7 % (ref 0–0.5)
LYMPHOCYTES # BLD AUTO: 2.5 K/UL (ref 1–4.8)
LYMPHOCYTES NFR BLD: 27.4 % (ref 18–48)
MAGNESIUM SERPL-MCNC: 2.1 MG/DL (ref 1.6–2.6)
MCH RBC QN AUTO: 28.3 PG (ref 27–31)
MCHC RBC AUTO-ENTMCNC: 33.4 G/DL (ref 32–36)
MCV RBC AUTO: 85 FL (ref 82–98)
MONOCYTES # BLD AUTO: 0.8 K/UL (ref 0.3–1)
MONOCYTES NFR BLD: 8.8 % (ref 4–15)
NEUTROPHILS # BLD AUTO: 5.6 K/UL (ref 1.8–7.7)
NEUTROPHILS NFR BLD: 60.7 % (ref 38–73)
NRBC BLD-RTO: 0 /100 WBC
PHOSPHATE SERPL-MCNC: 4.3 MG/DL (ref 2.7–4.5)
PLATELET # BLD AUTO: 108 K/UL (ref 150–450)
PMV BLD AUTO: 11.3 FL (ref 9.2–12.9)
POCT GLUCOSE: 112 MG/DL (ref 70–110)
POCT GLUCOSE: 136 MG/DL (ref 70–110)
POTASSIUM SERPL-SCNC: 4.2 MMOL/L (ref 3.5–5.1)
PROT SERPL-MCNC: 6.3 G/DL (ref 6–8.4)
RBC # BLD AUTO: 3.39 M/UL (ref 4–5.4)
SODIUM SERPL-SCNC: 140 MMOL/L (ref 136–145)
WBC # BLD AUTO: 9.14 K/UL (ref 3.9–12.7)

## 2023-07-26 PROCEDURE — 25000003 PHARM REV CODE 250: Mod: HCNC,NTX

## 2023-07-26 PROCEDURE — 25000003 PHARM REV CODE 250: Mod: HCNC,NTX | Performed by: HOSPITALIST

## 2023-07-26 PROCEDURE — 85025 COMPLETE CBC W/AUTO DIFF WBC: CPT | Mod: HCNC,NTX | Performed by: INTERNAL MEDICINE

## 2023-07-26 PROCEDURE — 80053 COMPREHEN METABOLIC PANEL: CPT | Mod: HCNC,NTX

## 2023-07-26 PROCEDURE — 99239 PR HOSPITAL DISCHARGE DAY,>30 MIN: ICD-10-PCS | Mod: HCNC,NTX,,

## 2023-07-26 PROCEDURE — 36415 COLL VENOUS BLD VENIPUNCTURE: CPT | Mod: HCNC,NTX | Performed by: INTERNAL MEDICINE

## 2023-07-26 PROCEDURE — 36415 COLL VENOUS BLD VENIPUNCTURE: CPT | Mod: HCNC,NTX

## 2023-07-26 PROCEDURE — 99239 HOSP IP/OBS DSCHRG MGMT >30: CPT | Mod: HCNC,NTX,,

## 2023-07-26 PROCEDURE — G0378 HOSPITAL OBSERVATION PER HR: HCPCS | Mod: HCNC,NTX

## 2023-07-26 PROCEDURE — 84100 ASSAY OF PHOSPHORUS: CPT | Mod: HCNC,NTX

## 2023-07-26 PROCEDURE — 83735 ASSAY OF MAGNESIUM: CPT | Mod: HCNC,NTX

## 2023-07-26 RX ORDER — POLYETHYLENE GLYCOL 3350 17 G/17G
17 POWDER, FOR SOLUTION ORAL 2 TIMES DAILY PRN
Status: DISCONTINUED | OUTPATIENT
Start: 2023-07-26 | End: 2023-07-26 | Stop reason: HOSPADM

## 2023-07-26 RX ORDER — NIFEDIPINE 30 MG/1
30 TABLET, EXTENDED RELEASE ORAL 2 TIMES DAILY
Qty: 60 TABLET | Refills: 2 | Status: SHIPPED | OUTPATIENT
Start: 2023-07-26 | End: 2023-11-05

## 2023-07-26 RX ADMIN — CARVEDILOL 25 MG: 25 TABLET, FILM COATED ORAL at 07:07

## 2023-07-26 RX ADMIN — PARICALCITOL 1 MCG: 1 CAPSULE, LIQUID FILLED ORAL at 08:07

## 2023-07-26 RX ADMIN — ESCITALOPRAM OXALATE 10 MG: 5 TABLET, FILM COATED ORAL at 08:07

## 2023-07-26 RX ADMIN — MUPIROCIN: 20 OINTMENT TOPICAL at 08:07

## 2023-07-26 RX ADMIN — CALCIUM ACETATE 667 MG: 667 CAPSULE ORAL at 08:07

## 2023-07-26 RX ADMIN — CHOLECALCIFEROL TAB 125 MCG (5000 UNIT) 5000 UNITS: 125 TAB at 08:07

## 2023-07-26 RX ADMIN — NIFEDIPINE 30 MG: 30 TABLET, FILM COATED, EXTENDED RELEASE ORAL at 09:07

## 2023-07-26 RX ADMIN — CALCIUM ACETATE 667 MG: 667 CAPSULE ORAL at 01:07

## 2023-07-26 RX ADMIN — POLYETHYLENE GLYCOL 3350 17 G: 17 POWDER, FOR SOLUTION ORAL at 10:07

## 2023-07-26 RX ADMIN — Medication 324 MG: at 08:07

## 2023-07-26 NOTE — NURSING
Pt AAOX4. No distress noted. Bed in lowest position. Side rails up x2. Call bell and personal belongs within reach. Safety precautions maintained. Pt free of falls or injuries. No further issues or concerns at this time. Plan of care continues.

## 2023-07-26 NOTE — PLAN OF CARE
Problem: Adult Inpatient Plan of Care  Goal: Plan of Care Review  Outcome: Ongoing, Progressing  Goal: Patient-Specific Goal (Individualized)  Outcome: Ongoing, Progressing  Goal: Absence of Hospital-Acquired Illness or Injury  Outcome: Ongoing, Progressing  Goal: Optimal Comfort and Wellbeing  Outcome: Ongoing, Progressing  Goal: Readiness for Transition of Care  Outcome: Ongoing, Progressing     Problem: Hemodynamic Instability (Hemodialysis)  Goal: Effective Tissue Perfusion  Outcome: Ongoing, Progressing     Problem: Infection (Hemodialysis)  Goal: Absence of Infection Signs and Symptoms  Outcome: Ongoing, Progressing     Problem: Diabetes Comorbidity  Goal: Blood Glucose Level Within Targeted Range  Outcome: Ongoing, Progressing     Problem: Fall Injury Risk  Goal: Absence of Fall and Fall-Related Injury  Outcome: Ongoing, Progressing     Problem: Device-Related Complication Risk (Hemodialysis)  Goal: Safe, Effective Therapy Delivery  Outcome: Ongoing, Not Progressing

## 2023-07-26 NOTE — NURSING
Discharge instructions and education given to pt and family. Verbalized understanding. Removal of IV. No redness, swelling or drainage noted. Pt escort requested.

## 2023-07-26 NOTE — DISCHARGE SUMMARY
[facility] [address] [place] [address] Medicine  Discharge Summary      Patient Name: Kathie Quezada  MRN: [id number]  BLAYNE: [id number]  Patient Class: OP- Observation  Admission Date: 7/21/2023  Hospital Length of Stay: 2 days  Discharge Date and Time:  07/26/2023 1:17 PM  Attending Physician: Elan Alarcon MD   Discharging Provider: Stepan Nuñez PA-C  Primary Care Provider: Elan Price MD  [facility] [place] [facility]: [facility] Stepan Nuñez PA-C  Primary Care Team: [facility]    HPI:   Kathie Quezada is a 63-year-old female with a  pmhx of CKD5, CHF, HTN, T2DM, Factor XI deficiency who presented to ED for evaluation of vascular access problem. She had dialysis AVF placed on left arm in March by Dr. Tavera in anticipation of HD.  She went to [facility]  earlier today to start dialysis for the first time.  Dialysis technician was unable to access fistula due to blood clots.  Fistulas located in her left upper arm.  She denies pain, redness or swelling at fistula site. Dialysis technician contacted Dr. Tavera's nurse who advised patient to come to ER for further evaluation and vascular surgery consultation. Patient overall feels tired with intermittent headache and nausea. Denies fever, chills, cough, sob, chest pain, leg swelling, weight gain, vomiting, diarrhea, abdominal pain, dysuria, hematuria, focal weakness or numbness. Patient still makes good bit of urine daily and takes fluid pills for leg swelling.     ED Course: afebrile and vitals stable. K 4.1, Bicarb 23, Bun 44,  Cr 3.7, , Troponin negative. Patient received tylenol and zofran in ED. She is conversant and offers no complaints during my interview. She reports improvement of headache and nausea with tylenol and zofran.          * No surgery found *      Hospital Course:   Kathie Quezada is placed in  Observation for management of clotted dialysis access. Vascular consulted for fistulagram. Vascular recommending HD trial. HD  trial unsuccessful. US HD access showing limited HD AV fistula evaluation. US of graft showing pseudoaneurysm and hematoma, however no concerning findings on exam and symptoms improving. Successful completion of US guided access of HD. In outpatient setting US guided access is not feasible however Nephrology with not recommending further HD at this time and as such Vascular will hold on permacath at this time. She will follow up with Nephrology and Vascular to discuss outpatient fistulagram and superficialization. She will also follow up with PCP. Over hospital course, blood pressure improving after starting nifedipine bid and patient will continue at discharge. Medications sent to preferred pharmacy. Patient medically ready for discharge. Plan of care discussed with patient, patient agreeable to plan, and all questions answered.       Goals of Care Treatment Preferences:  Code Status: Full Code          What is most important right now is to focus on extending life as long as possible, even it it means sacrificing quality.  Accordingly, we have decided that the best plan to meet the patient's goals includes continuing with treatment.      Consults:   Consults (From admission, onward)        Status Ordering Provider     Inpatient consult to Nephrology  Once        Provider:  (Not yet assigned)    Completed WILLIS MEAD     Inpatient consult to Vascular Surgery  Once        Provider:  (Not yet assigned)    Completed JONNY BRIDGES          No new Assessment & Plan notes have been filed under this hospital service since the last note was generated.  Service: Hospital Medicine    Final Active Diagnoses:    Diagnosis Date Noted POA    PRINCIPAL PROBLEM:  Clotted dialysis access [T82.49XA] 07/22/2023 Yes    Pulmonary hypertension [I27.20] 02/06/2023 Yes    Metabolic bone disease [M89.8X9] 02/04/2023 Yes    Chronic kidney disease (CKD), stage V [N18.5] 09/20/2022 Yes     Chronic    Type 2 diabetes mellitus  with stage 5 chronic kidney disease not on chronic dialysis, without long-term current use of insulin [E11.22, N18.5] 09/07/2022 Yes     Chronic    Generalized anxiety disorder [F41.1] 11/08/2019 Yes     Chronic    Chronic diastolic heart failure [I50.32] 02/07/2019 Yes     Chronic    Renovascular hypertension [I15.0] 04/09/2018 Yes     Chronic      Problems Resolved During this Admission:    Diagnosis Date Noted Date Resolved POA    ESRD (end stage renal disease) [N18.6] 01/14/2022 07/23/2023 Yes       Discharged Condition: stable    Disposition: Home or Self Care    Follow Up:   Follow-up Information     Elan Price MD Follow up in 1 week(s).    Specialties: Internal Medicine, Hospitalist  Contact information[facility]  [facility] [address] [phone]                       Patient Instructions:      Ambulatory referral/consult to Nephrology   Standing Status: Future   Referral Priority: Urgent Referral Type: Consultation   Referral Reason: Specialty Services Required   Requested Specialty: Nephrology   Number of Visits Requested: 1     Ambulatory referral/consult to Vascular Surgery   Standing Status: Future   Referral Priority: Urgent Referral Type: Consultation   Referral Reason: Specialty Services Required   Requested Specialty: Vascular Surgery   Number of Visits Requested: 1     Diet diabetic     Notify your health care provider if you experience any of the following:  severe uncontrolled pain     Notify your health care provider if you experience any of the following:  difficulty breathing or increased cough     Notify your health care provider if you experience any of the following:  persistent dizziness, light-headedness, or visual disturbances     Notify your health care provider if you experience any of the following:  increased confusion or weakness     Activity as tolerated       Significant Diagnostic Studies: N/A    Pending Diagnostic Studies:     None          Medications:  Reconciled Home Medications:      Medication List      START taking these medications    NIFEdipine 30 MG (OSM) 24 hr tablet  Commonly known as: PROCARDIA-XL  Take 1 tablet (30 mg total) by mouth 2 (two) times a day.        CONTINUE taking these medications    calcium acetate(phosphat bind) 667 mg capsule  Commonly known as: PHOSLO  Take 1 capsule (667 mg total) by mouth 3 (three) times daily with meals.     carvediloL 25 MG tablet  Commonly known as: COREG  Take 1 tablet (25 mg total) by mouth 2 (two) times daily with meals.     cholecalciferol (vitamin D3) 125 mcg (5,000 unit) Tab  Take 1 tablet (5,000 Units total) by mouth once daily.     EScitalopram oxalate 10 MG tablet  Commonly known as: LEXAPRO  Take 1 tablet (10 mg total) by mouth once daily.     ferrous gluconate 324 MG tablet  Commonly known as: FERGON  Take 1 tablet (324 mg total) by mouth daily with breakfast.     furosemide 40 MG tablet  Commonly known as: LASIX  Take 2 tablets (80 mg total) by mouth 2 (two) times a day.     levocetirizine 5 MG tablet  Commonly known as: XYZAL  Take 1 tablet (5 mg total) by mouth daily as needed for Allergies.     SYNTHROID 200 MCG tablet  Generic drug: levothyroxine  Take 1 tablet (200 mcg total) by mouth before breakfast.     traZODone 50 MG tablet  Commonly known as: DESYREL  Take 1 to 2 tablets ( mg total) by mouth nightly as needed for Insomnia.        STOP taking these medications    paricalcitoL 1 MCG capsule  Commonly known as: ZEMPLAR            Indwelling Lines/Drains at time of discharge:   Lines/Drains/Airways     Drain  Duration                Hemodialysis AV Fistula 12/16/22 0649 Left upper arm 222 days                Time spent on the discharge of patient: 33 minutes         Stepan Nuñez PA-C  Department of Hospital Medicine  [facility]

## 2023-07-26 NOTE — PLAN OF CARE
[facility]  Discharge Final Note    Primary Care Provider: Elan Price MD    Expected Discharge Date: 7/26/2023  Future Appointments   Date Time Provider Department Center   8/1/2023  2:00 PM Elan Price MD [facility]   8/31/2023  8:00 AM [facility] TRANSPLANT [facility]   8/31/2023  1:15 PM [facility]   8/31/2023  1:30 PM [facility]     Pt discharged home with no services.CM attempted to schedule Nephrology and Vascular Surgery Clinic referrals. No appointments available in the requested time,   will send message to clinic nurse to contact pt/family with appointmtent. Note put in AVS.   Ishmael Decker, RN,BSN        Final Discharge Note (most recent)       Final Note - 07/26/23 1334          Final Note    Assessment Type Final Discharge Note     Anticipated Discharge Disposition Home or Self Care     Hospital Resources/Appts/Education Provided Provided patient/caregiver with written discharge plan information;Appointments scheduled and added to AVS;Appointments scheduled by Navigator/Coordinator        Post-Acute Status    Discharge Delays None known at this time                     Important Message from Medicare             Contact Info       Vascular Surgery Clinic        Next Steps: Follow up    Instructions: The clinic nurse will call you with a follow up appointment. If you do not hear from them in 48 hours, please call [phone].    Nephrology Clinic        Next Steps: Follow up    Instructions: The clinic nurse will call you with a follow up appointment. If you do not hear from them in 48 hours, please call [phone].

## 2023-07-26 NOTE — PLAN OF CARE
Problem: Adult Inpatient Plan of Care  Goal: Plan of Care Review  Outcome: Met  Goal: Patient-Specific Goal (Individualized)  Outcome: Met  Goal: Absence of Hospital-Acquired Illness or Injury  Outcome: Met  Goal: Optimal Comfort and Wellbeing  Outcome: Met  Goal: Readiness for Transition of Care  Outcome: Met     Problem: Device-Related Complication Risk (Hemodialysis)  Goal: Safe, Effective Therapy Delivery  Outcome: Met     Problem: Hemodynamic Instability (Hemodialysis)  Goal: Effective Tissue Perfusion  Outcome: Met     Problem: Infection (Hemodialysis)  Goal: Absence of Infection Signs and Symptoms  Outcome: Met     Problem: Diabetes Comorbidity  Goal: Blood Glucose Level Within Targeted Range  Outcome: Met     Problem: Fall Injury Risk  Goal: Absence of Fall and Fall-Related Injury  Outcome: Met

## 2023-08-01 ENCOUNTER — LAB VISIT (OUTPATIENT)
Dept: LAB | Facility: HOSPITAL | Age: 63
End: 2023-08-01
Attending: INTERNAL MEDICINE
Payer: MEDICARE

## 2023-08-01 ENCOUNTER — OFFICE VISIT (OUTPATIENT)
Dept: INTERNAL MEDICINE | Facility: CLINIC | Age: 63
End: 2023-08-01
Payer: MEDICARE

## 2023-08-01 VITALS
HEART RATE: 77 BPM | BODY MASS INDEX: 34.3 KG/M2 | DIASTOLIC BLOOD PRESSURE: 82 MMHG | SYSTOLIC BLOOD PRESSURE: 128 MMHG | HEIGHT: 70 IN | OXYGEN SATURATION: 96 % | WEIGHT: 239.63 LBS

## 2023-08-01 DIAGNOSIS — E55.9 VITAMIN D DEFICIENCY: ICD-10-CM

## 2023-08-01 DIAGNOSIS — D50.8 OTHER IRON DEFICIENCY ANEMIA: ICD-10-CM

## 2023-08-01 DIAGNOSIS — I15.0 RENOVASCULAR HYPERTENSION: Chronic | ICD-10-CM

## 2023-08-01 DIAGNOSIS — E11.3553 CONTROLLED TYPE 2 DIABETES MELLITUS WITH STABLE PROLIFERATIVE RETINOPATHY OF BOTH EYES, UNSPECIFIED WHETHER LONG TERM INSULIN USE: Chronic | ICD-10-CM

## 2023-08-01 DIAGNOSIS — N18.5 TYPE 2 DIABETES MELLITUS WITH STAGE 5 CHRONIC KIDNEY DISEASE NOT ON CHRONIC DIALYSIS, WITHOUT LONG-TERM CURRENT USE OF INSULIN: Chronic | ICD-10-CM

## 2023-08-01 DIAGNOSIS — D63.1 ANEMIA IN STAGE 5 CHRONIC KIDNEY DISEASE, NOT ON CHRONIC DIALYSIS: ICD-10-CM

## 2023-08-01 DIAGNOSIS — E11.22 TYPE 2 DIABETES MELLITUS WITH STAGE 5 CHRONIC KIDNEY DISEASE NOT ON CHRONIC DIALYSIS, WITHOUT LONG-TERM CURRENT USE OF INSULIN: Chronic | ICD-10-CM

## 2023-08-01 DIAGNOSIS — T46.6X5A MYALGIA DUE TO STATIN: ICD-10-CM

## 2023-08-01 DIAGNOSIS — M79.10 MYALGIA DUE TO STATIN: ICD-10-CM

## 2023-08-01 DIAGNOSIS — J30.9 CHRONIC ALLERGIC RHINITIS: Chronic | ICD-10-CM

## 2023-08-01 DIAGNOSIS — N18.5 ANEMIA IN STAGE 5 CHRONIC KIDNEY DISEASE, NOT ON CHRONIC DIALYSIS: ICD-10-CM

## 2023-08-01 DIAGNOSIS — F41.1 GENERALIZED ANXIETY DISORDER: Chronic | ICD-10-CM

## 2023-08-01 DIAGNOSIS — I50.32 CHRONIC DIASTOLIC HEART FAILURE: Chronic | ICD-10-CM

## 2023-08-01 DIAGNOSIS — Z98.890 S/P ARTERIOVENOUS (AV) FISTULA CREATION: ICD-10-CM

## 2023-08-01 DIAGNOSIS — Z12.31 ENCOUNTER FOR SCREENING MAMMOGRAM FOR BREAST CANCER: ICD-10-CM

## 2023-08-01 DIAGNOSIS — D68.1 FACTOR XI DEFICIENCY: Chronic | ICD-10-CM

## 2023-08-01 DIAGNOSIS — E89.0 POSTOPERATIVE HYPOTHYROIDISM: Chronic | ICD-10-CM

## 2023-08-01 DIAGNOSIS — E11.42 DIABETIC POLYNEUROPATHY ASSOCIATED WITH TYPE 2 DIABETES MELLITUS: Chronic | ICD-10-CM

## 2023-08-01 DIAGNOSIS — E11.3553 CONTROLLED TYPE 2 DIABETES MELLITUS WITH STABLE PROLIFERATIVE RETINOPATHY OF BOTH EYES, UNSPECIFIED WHETHER LONG TERM INSULIN USE: Primary | Chronic | ICD-10-CM

## 2023-08-01 DIAGNOSIS — N18.5 CHRONIC KIDNEY DISEASE (CKD), STAGE V: Chronic | ICD-10-CM

## 2023-08-01 DIAGNOSIS — E66.01 CLASS 2 SEVERE OBESITY DUE TO EXCESS CALORIES WITH SERIOUS COMORBIDITY AND BODY MASS INDEX (BMI) OF 35.0 TO 35.9 IN ADULT: ICD-10-CM

## 2023-08-01 DIAGNOSIS — E78.2 MIXED HYPERLIPIDEMIA: ICD-10-CM

## 2023-08-01 DIAGNOSIS — M89.8X9 METABOLIC BONE DISEASE: ICD-10-CM

## 2023-08-01 DIAGNOSIS — R80.9 NEPHROTIC RANGE PROTEINURIA: ICD-10-CM

## 2023-08-01 PROBLEM — R50.82 POSTOPERATIVE FEVER: Status: RESOLVED | Noted: 2023-02-01 | Resolved: 2023-08-01

## 2023-08-01 PROBLEM — Z91.010 PEANUT ALLERGY: Status: RESOLVED | Noted: 2023-02-04 | Resolved: 2023-08-01

## 2023-08-01 PROCEDURE — 3044F HG A1C LEVEL LT 7.0%: CPT | Mod: HCNC,CPTII,S$GLB, | Performed by: INTERNAL MEDICINE

## 2023-08-01 PROCEDURE — 99999 PR PBB SHADOW E&M-EST. PATIENT-LVL IV: CPT | Mod: PBBFAC,HCNC,, | Performed by: INTERNAL MEDICINE

## 2023-08-01 PROCEDURE — 3066F NEPHROPATHY DOC TX: CPT | Mod: HCNC,CPTII,S$GLB, | Performed by: INTERNAL MEDICINE

## 2023-08-01 PROCEDURE — 3066F PR DOCUMENTATION OF TREATMENT FOR NEPHROPATHY: ICD-10-PCS | Mod: HCNC,CPTII,S$GLB, | Performed by: INTERNAL MEDICINE

## 2023-08-01 PROCEDURE — 3079F PR MOST RECENT DIASTOLIC BLOOD PRESSURE 80-89 MM HG: ICD-10-PCS | Mod: HCNC,CPTII,S$GLB, | Performed by: INTERNAL MEDICINE

## 2023-08-01 PROCEDURE — 83036 HEMOGLOBIN GLYCOSYLATED A1C: CPT | Mod: HCNC,TXP | Performed by: INTERNAL MEDICINE

## 2023-08-01 PROCEDURE — 99350 HOME/RES VST EST HIGH MDM 60: CPT | Mod: HCNC,S$GLB,, | Performed by: INTERNAL MEDICINE

## 2023-08-01 PROCEDURE — 36415 COLL VENOUS BLD VENIPUNCTURE: CPT | Mod: HCNC,TXP | Performed by: INTERNAL MEDICINE

## 2023-08-01 PROCEDURE — 3044F PR MOST RECENT HEMOGLOBIN A1C LEVEL <7.0%: ICD-10-PCS | Mod: HCNC,CPTII,S$GLB, | Performed by: INTERNAL MEDICINE

## 2023-08-01 PROCEDURE — 3008F BODY MASS INDEX DOCD: CPT | Mod: HCNC,CPTII,S$GLB, | Performed by: INTERNAL MEDICINE

## 2023-08-01 PROCEDURE — 99999 PR PBB SHADOW E&M-EST. PATIENT-LVL IV: ICD-10-PCS | Mod: PBBFAC,HCNC,, | Performed by: INTERNAL MEDICINE

## 2023-08-01 PROCEDURE — 99350 PR HOME VISIT,ESTAB PATIENT,LEVEL IV: ICD-10-PCS | Mod: HCNC,S$GLB,, | Performed by: INTERNAL MEDICINE

## 2023-08-01 PROCEDURE — 3079F DIAST BP 80-89 MM HG: CPT | Mod: HCNC,CPTII,S$GLB, | Performed by: INTERNAL MEDICINE

## 2023-08-01 PROCEDURE — 1159F MED LIST DOCD IN RCRD: CPT | Mod: HCNC,CPTII,S$GLB, | Performed by: INTERNAL MEDICINE

## 2023-08-01 PROCEDURE — 3074F SYST BP LT 130 MM HG: CPT | Mod: HCNC,CPTII,S$GLB, | Performed by: INTERNAL MEDICINE

## 2023-08-01 PROCEDURE — 3074F PR MOST RECENT SYSTOLIC BLOOD PRESSURE < 130 MM HG: ICD-10-PCS | Mod: HCNC,CPTII,S$GLB, | Performed by: INTERNAL MEDICINE

## 2023-08-01 PROCEDURE — 1159F PR MEDICATION LIST DOCUMENTED IN MEDICAL RECORD: ICD-10-PCS | Mod: HCNC,CPTII,S$GLB, | Performed by: INTERNAL MEDICINE

## 2023-08-01 PROCEDURE — 3008F PR BODY MASS INDEX (BMI) DOCUMENTED: ICD-10-PCS | Mod: HCNC,CPTII,S$GLB, | Performed by: INTERNAL MEDICINE

## 2023-08-01 NOTE — PROGRESS NOTES
[facility]  Progress Note   Recent Hospital Discharge     PRESENTING HISTORY     Chief Complaint/Reason for Visit:  Follow up Hospital Discharge     PCP: Elan Price MD    History of Present Illness: Ms. Kathie Quezada is a 63 y.o. female who was recently admitted to the hospital.    Admission Date: 7/21/2023  Discharge Date and Time:  07/26/2023   Attending Physician: Elan Alarcon MD   Discharging Provider: Stepan Nuñez PA-C  Hospital Medicine Team: [facility] Stepan Nuñez PA-C  Primary Care Team: [facility] MED      HPI:   Kathie Quezada is a 63-year-old female with a  pmhx of CKD5, CHF, HTN, T2DM, Factor XI deficiency who presented to ED for evaluation of vascular access problem. She had dialysis AVF placed on left arm in March by Dr. Tavera in anticipation of HD.  She went to [facility]  earlier today to start dialysis for the first time.  Dialysis technician was unable to access fistula due to blood clots.  Fistulas located in her left upper arm.  She denies pain, redness or swelling at fistula site. Dialysis technician contacted Dr. Tavera's nurse who advised patient to come to ER for further evaluation and vascular surgery consultation. Patient overall feels tired with intermittent headache and nausea. Denies fever, chills, cough, sob, chest pain, leg swelling, weight gain, vomiting, diarrhea, abdominal pain, dysuria, hematuria, focal weakness or numbness. Patient still makes good bit of urine daily and takes fluid pills for leg swelling.      ED Course: afebrile and vitals stable. K 4.1, Bicarb 23, Bun 44,  Cr 3.7, , Troponin negative. Patient received tylenol and zofran in ED. She is conversant and offers no complaints during my interview. She reports improvement of headache and nausea with tylenol and zofran.      Hospital Course:   Kathie Quezada is placed in  Observation for management of clotted dialysis access. Vascular consulted for fistulagram. Vascular  recommending HD trial. HD trial unsuccessful. US HD access showing limited HD AV fistula evaluation. US of graft showing pseudoaneurysm and hematoma, however no concerning findings on exam and symptoms improving. Successful completion of US guided access of HD. In outpatient setting US guided access is not feasible however Nephrology with not recommending further HD at this time and as such Vascular will hold on permacath at this time. She will follow up with Nephrology and Vascular to discuss outpatient fistulagram and superficialization. She will also follow up with PCP. Over hospital course, blood pressure improving after starting nifedipine bid and patient will continue at discharge. Medications sent to preferred pharmacy. Patient medically ready for discharge. Plan of care discussed with patient, patient agreeable to plan, and all questions answered.     _____________________________________________________________________________________    Today:    No chest pain or SOB.  Discussed need to fix the AV graft prior to HD.    PAST HISTORY:     Past Medical History:   Diagnosis Date    Acute respiratory failure with hypoxia and hypercapnia 05/26/2019    ATN (acute tubular necrosis) 02/08/2019    Chronic diastolic heart failure 02/07/2019    2-8-19 Mild left atrial enlargement. Mild left ventricular enlargement. Normal left ventricular systolic function. The estimated ejection fraction is 55% Indeterminate left ventricular diastolic function. Normal right ventricular systolic function. Mild mitral regurgitation. Mild to moderate tricuspid regurgitation. The estimated PA systolic pressure is 32 mm Hg Normal central venous pressure (3 m    CKD (chronic kidney disease) stage 4, GFR 15-29 ml/min 05/16/2018    CKD stage G3a/A3, GFR 45-59 and albumin creatinine ratio >300 mg/g 05/16/2018    CKD stage G3b/A3, GFR 30-44 and albumin creatinine ratio >300 mg/g 05/16/2018    Closed compression fracture of L2 lumbar vertebra  05/24/2019    Closed compression fracture of L2 lumbar vertebra 05/24/2019     IMO Regulatory Load October 2019    Controlled type 2 diabetes with retinopathy 04/09/2018    COVID-19 virus infection 7-1-2021 07/01/2021    CVA (cerebral vascular accident) 2017    Diabetic polyneuropathy associated with type 2 diabetes mellitus 12/04/2020    Encounter for blood transfusion     Essential hypertension 04/09/2018    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  7-31-19 Factor XI Activity 55 - 145 % 96       Gastroesophageal reflux disease without esophagitis 05/24/2019    Generalized anxiety disorder 11/08/2019    GERD (gastroesophageal reflux disease)     GIB (gastrointestinal bleeding) 10/18/2018    History of CVA (cerebrovascular accident) without residual deficits 11/27/2022    History of hepatitis C     s/p succesful Rx w/ Harvoni  - [id number] (cure) 2016    Hypertensive retinopathy, bilateral 12/10/2019    Mild nonproliferative diabetic retinopathy 05/02/2018    Mixed hyperlipidemia 04/09/2018    Myalgia due to statin 09/07/2022    Normocytic anemia 01/14/2022    NS (nuclear sclerosis), bilateral 12/10/2019    Peanut allergy 2/4/2023    PNA (pneumonia) 05/28/2019    Postoperative hypothyroidism     Renovascular hypertension 04/09/2018    S/P kyphoplasty 08/14/2019 8/14/19: L2 kyphoplasty with Dr. Kwon     Slow transit constipation 05/27/2019    Type 2 diabetes mellitus with circulatory disorder, with long-term current use of insulin 04/09/2018    Type 2 diabetes mellitus with stage 3 chronic kidney disease, without long-term current use of insulin 04/09/2018    Type 2 diabetes mellitus with stage 3a chronic kidney disease, without long-term current use of insulin 03/03/2021    Type 2 diabetes mellitus with stage 3b chronic kidney disease, without long-term current use of insulin 09/07/2022       Past Surgical History:   Procedure Laterality Date    AV FISTULA PLACEMENT Left 10/27/2022    Procedure:  CREATION, AV FISTULA;  Surgeon: Tirso Tavera MD;  Location: [address];  Service: Peripheral Vascular;  Laterality: Left;  Brachio basilic 1st stage     BACK SURGERY  2017    CHOLECYSTECTOMY  2015    [place]    COLONOSCOPY  11/13/2015    Dr Chino torres prep. internal hemorrhoids. diverticulosis of sigmois. NO polyps. repeat due 2025    FISTULOGRAM N/A 12/16/2022    Procedure: Fistulogram;  Surgeon: Tirso Tavera MD;  Location: [facility] CATH LAB;  Service: Peripheral Vascular;  Laterality: N/A;    FISTULOGRAM, WITH PTA Left 3/23/2023    Procedure: FISTULOGRAM, WITH PTA;  Surgeon: Tirso Tavera MD;  Location: [facility] OR [address] [place] [address];  Service: Peripheral Vascular;  Laterality: Left;  LUE TRANSRADIAL CO2 FISTULOGRAM    1.9 min  22.99 mGy  4.3032 Gy.cm  200cc CO2  9ml TransRadial Solution    FIXATION KYPHOPLASTY Bilateral 08/14/2019    Procedure: L2 kyphoplasty Fluoro Globus;  Surgeon: Jeremie Kwon DO;  Location: [facility];  Service: Neurosurgery;  Laterality: Bilateral;  GLOBUS MACRINA WINSLOW [phone] TEXTED HER @ 10:54AM ON 7-30-19  PRE-OP-BY RN  8-7-2019    HYSTERECTOMY      OOPHORECTOMY  1996    one    PERCUTANEOUS TRANSLUMINAL ANGIOPLASTY OF ARTERIOVENOUS FISTULA Left 12/16/2022    Procedure: PTA, AV FISTULA;  Surgeon: Tirso Tavera MD;  Location: [facility] CATH LAB;  Service: Peripheral Vascular;  Laterality: Left;    RIGHT HEART CATHETERIZATION Right 6/15/2023    Procedure: INSERTION, CATHETER, RIGHT HEART;  Surgeon: Lucretia Rivas MD;  Location: [facility] CATH LAB;  Service: Cardiology;  Laterality: Right;    THYROIDECTOMY  2016    at [facility] for thyroid nodule    TONSILLECTOMY      TRANSPOSITION OF BASILIC VEIN Left 1/31/2023    Procedure: TRANSPOSITION, VEIN, BASILIC;  Surgeon: Tirso Tavera MD;  Location: [facility] OR [address] [place] [address];  Service: Peripheral Vascular;  Laterality: Left;       Family History   Problem Relation Age of Onset    Stroke Mother     Heart disease Mother     Hypertension Mother     Diabetes type  II Mother     Coronary artery disease Mother     Hypertension Father     Prostate cancer Father     Aneurysm Brother        Social History     Socioeconomic History    Marital status:    Tobacco Use    Smoking status: Never    Smokeless tobacco: Never   Substance and Sexual Activity    Alcohol use: Not Currently    Drug use: No    Sexual activity: Not Currently   Social History Narrative    Pt enjoys cooking and baking and making Floral arrangement and [employer]      Social Determinants of Health     Financial Resource Strain: Low Risk  (7/3/2023)    Overall Financial Resource Strain (CARDIA)     Difficulty of Paying Living Expenses: Not very hard   Recent Concern: Financial Resource Strain - Medium Risk (5/23/2023)    Overall Financial Resource Strain (CARDIA)     Difficulty of Paying Living Expenses: Somewhat hard   Food Insecurity: Food Insecurity Present (7/3/2023)    Hunger Vital Sign     Worried About Running Out of Food in the Last Year: Sometimes true     Ran Out of Food in the Last Year: Sometimes true   Transportation Needs: Unmet Transportation Needs (7/3/2023)    PRAPARE - Transportation     Lack of Transportation (Medical): Yes     Lack of Transportation (Non-Medical): Yes   Physical Activity: Insufficiently Active (7/3/2023)    Exercise Vital Sign     Days of Exercise per Week: 3 days     Minutes of Exercise per Session: 20 min   Stress: No Stress Concern Present (7/3/2023)    [employer] [employer] - Occupational Stress Questionnaire     Feeling of Stress : Not at all   Social Connections: Unknown (7/3/2023)    Social Connection and Isolation Panel [NHANES]     Frequency of Communication with Friends and Family: More than three times a week     Frequency of Social Gatherings with Friends and Family: More than three times a week     Active Member of Clubs or Organizations: No     Attends Club or Organization Meetings: Patient refused     Marital Status:    Housing  Stability: Low Risk  (7/3/2023)    Housing Stability Vital Sign     Unable to Pay for Housing in the Last Year: No     Number of Places Lived in the Last Year: 1     Unstable Housing in the Last Year: No       MEDICATIONS & ALLERGIES:     Current Outpatient Medications on File Prior to Visit   Medication Sig Dispense Refill    calcium acetate,phosphat bind, (PHOSLO) 667 mg capsule Take 1 capsule (667 mg total) by mouth 3 (three) times daily with meals. 90 capsule 2    carvediloL (COREG) 25 MG tablet Take 1 tablet (25 mg total) by mouth 2 (two) times daily with meals. 180 tablet 3    cholecalciferol, vitamin D3, 125 mcg (5,000 unit) Tab Take 1 tablet (5,000 Units total) by mouth once daily. 90 tablet 3    EScitalopram oxalate (LEXAPRO) 10 MG tablet Take 1 tablet (10 mg total) by mouth once daily. 90 tablet 3    ferrous gluconate (FERGON) 324 MG tablet Take 1 tablet (324 mg total) by mouth daily with breakfast. 90 tablet 1    furosemide (LASIX) 40 MG tablet Take 2 tablets (80 mg total) by mouth 2 (two) times a day. 120 tablet 3    levocetirizine (XYZAL) 5 MG tablet Take 1 tablet (5 mg total) by mouth daily as needed for Allergies. 90 tablet 3    NIFEdipine (PROCARDIA-XL) 30 MG (OSM) 24 hr tablet Take 1 tablet (30 mg total) by mouth 2 (two) times a day. 60 tablet 2    OPW TEST CLAIM - DO NOT FILL OPW test claim. Do not fill. 1 tablet 0    SYNTHROID 200 mcg tablet Take 1 tablet (200 mcg total) by mouth before breakfast. 90 tablet 3    traZODone (DESYREL) 50 MG tablet Take 1 to 2 tablets ( mg total) by mouth nightly as needed for Insomnia. 120 tablet 3     No current facility-administered medications on file prior to visit.        Review of patient's allergies indicates:   Allergen Reactions    Atorvastatin Other (See Comments)     Statin induced myalgia    Mushroom Hives    Peanut Anaphylaxis    Bactrim [sulfamethoxazole-trimethoprim] Hives    Gabapentin      Pruritis     Iodine     Peanut butter flavor      Penicillins Hives    Shellfish containing products Itching    Sulfa (sulfonamide antibiotics) Hives       OBJECTIVE:     Vital Signs:  Vitals:    08/01/23 1348   BP: 128/82   Pulse: 77     Wt Readings from Last 3 Encounters:   08/01/23 1348 108.7 kg (239 lb 10.2 oz)   07/22/23 2259 110 kg (242 lb 8.1 oz)   07/21/23 1820 107 kg (235 lb 14.3 oz)   07/10/23 1107 105.9 kg (233 lb 7.5 oz)     Body mass index is 34.38 kg/m².     Physical Exam:  General: Well developed, well nourished. No distress.  HEENT: Head is normocephalic, atraumatic   Eyes: Clear conjunctiva.  Neck: Supple, symmetrical neck; trachea midline.  Lungs: Clear to auscultation bilaterally and normal respiratory effort.  Cardiovascular: Heart with regular rate and rhythm.    Extremities: No LE edema.   Abdomen: Abdomen is soft, non-tender non-distended with normal bowel sounds.  Skin: Skin color, texture, turgor normal. No rashes.  Musculoskeletal: Normal gait.   Psychiatric: Normal affect. Alert.      Laboratory  Lab Results   Component Value Date    WBC 9.14 07/26/2023    HGB 9.6 (L) 07/26/2023    HCT 28.7 (L) 07/26/2023    MCV 85 07/26/2023     (L) 07/26/2023     BMP  Lab Results   Component Value Date     07/26/2023    K 4.2 07/26/2023     07/26/2023    CO2 21 (L) 07/26/2023    BUN 69 (H) 07/26/2023    CREATININE 4.5 (H) 07/26/2023    CALCIUM 9.3 07/26/2023    ANIONGAP 13 07/26/2023    EGFRNORACEVR 10.5 (A) 07/26/2023     Lab Results   Component Value Date    ALT 9 (L) 07/26/2023    AST 14 07/26/2023    ALKPHOS 96 07/26/2023    BILITOT 0.3 07/26/2023     Lab Results   Component Value Date    INR 1.1 06/15/2023    INR 1.1 12/21/2022    INR 1.1 11/17/2022     Lab Results   Component Value Date    HGBA1C 5.2 02/02/2023       TRANSITION OF CARE:       Family and/or Caretaker present at visit?  Yes.  Diagnostic tests reviewed/disposition: No diagnosic tests pending after this hospitalization.  Disease/illness education: CKD 5, clotted  fistula.  Home health/community services discussion/referrals: Patient does not have home health established from hospital visit.  They do not need home health.  If needed, we will set up home health for the patient.   Establishment or re-establishment of referral orders for community resources: No other necessary community resources.   Discussion with other health care providers: No discussion with other health care providers necessary.     Transition of Care Visit:     I have reviewed and updated the history and problem list.  I have reconciled the medication list.  I have discussed the hospitalization and current medical issues, prognosis and plans with the patient/family.  I  spent more than 50% of time discussing the care with the patient/family.  Total Face-to-Face Encounter in the [facility]: 60 minutes.    Medications Reconciliation:   I have reconciled the patient's home medications and discharge medications with the patient/family. I have updated all changes.  Refer to After-Visit Medication List.    ASSESSMENT & PLAN:     CKD stage 5  Nephrotic range proteinuria  Anemia in CKD 5  S/p AV fistula  Metabolic bone disease  Iron deficiency anemia.  -  Phoslo TID.        Iron 325 mg daily.    Chronic diastolic heart failure  Renovascular hypertension  - Compensated CHF.    Could not get DIgital HTN to work for her.          US 11-19-21  Preserved right and left cortical thickness, echogenicity, and corticomedullary differentiation.  No right or left renal masses, stones, or hydronephrosis.  Right renal resistive index 0.83.  Left renal resistive index 0.82.  Right kidney measures 10.7 cm.  Left kidney measures 10.9 cm.  Given level of bladder distention with urine, no intraluminal masses. Unremarkable renal ultrasound.       CHF/HTN Regimen:   -     Procardia XL 30 BID  -     carvediloL (COREG) 25 MG BID  -     furosemide (LASIX) 80 MG BID     Type 2 diabetes mellitus with stage 5 chronic kidney disease,  without long-term current use of insulin  Diabetic Neuropathy and retinopathy  Diabetic neuropathy  - Diet controlled. A1c 5.5     Mixed hyperlipidemia  Statin Induced myopathy.  - LDL goal < 100    Last LDL 88 ()     - Statin induced myopathy with atorvastatin.    Consider other agent in the future.     Vitamin D deficiency  - Change from weekly to daily Vitamin D3 5000 IU daily OTC.   -     cholecalciferol, vitamin D3, 125 mcg (5,000 unit) Tab; Take 1 tablet (5,000 Units total) by mouth once daily.     Postoperative hypothyroidism  -     SYNTHROID 200 mcg tablet; Take 1 tablet (200 mcg total) by mouth before breakfast.      High TSH due to patient not taking med.        Dicussed with the patient.     Generalized anxiety disorder  -     EScitalopram oxalate (LEXAPRO) 10 MG tablet; Take 1 tablet (10 mg total) by mouth once daily.   -     traZODone (DESYREL) 50 MG tablet; Take 1 tablet (50 mg total) by mouth nightly as needed for Insomnia.     Factor XI deficiency  - Usually get  FFP 2 units right before surgery for Factor XI deficiency     Class 2 severe obesity due to excess calories with serious comorbidity and body mass index (BMI) of 35.0 to 35.9 in adult  Body mass index is 36.31 kg/m².     Chronic allergic rhinitis  -     levocetirizine (XYZAL) 5 MG tablet; Take 1 tablet (5 mg total) by mouth daily as needed for Allergies.      Preventive Health Maintenance:     Up to date    -     Hemoglobin A1C; Future; Expected date: 08/01/2023  -     Mammo Digital Screening Bilat; Future; Expected date: 08/01/2023    Return to Clinic for Follow Up with me:   6 months for annual.      Scheduled Follow-up :  Future Appointments   Date Time Provider Department Center   8/9/2023  1:00 PM VASCULAR, LAB [facility] ROSI Dill   8/9/2023  1:20 PM Tirso Tavera MD [facility] SHAWNA Dill   8/10/2023 10:30 AM Fred Kelly MD [facility] NEPHRO [facility] D[facility]       After Visit Medication List :     Medication List            Accurate  as of August 1, 2023  2:27 PM. If you have any questions, ask your nurse or doctor.                CONTINUE taking these medications      calcium acetate(phosphat bind) 667 mg capsule  Commonly known as: PHOSLO  Take 1 capsule (667 mg total) by mouth 3 (three) times daily with meals.     carvediloL 25 MG tablet  Commonly known as: COREG  Take 1 tablet (25 mg total) by mouth 2 (two) times daily with meals.     cholecalciferol (vitamin D3) 125 mcg (5,000 unit) Tab  Take 1 tablet (5,000 Units total) by mouth once daily.     EScitalopram oxalate 10 MG tablet  Commonly known as: LEXAPRO  Take 1 tablet (10 mg total) by mouth once daily.     ferrous gluconate 324 MG tablet  Commonly known as: FERGON  Take 1 tablet (324 mg total) by mouth daily with breakfast.     furosemide 40 MG tablet  Commonly known as: LASIX  Take 2 tablets (80 mg total) by mouth 2 (two) times a day.     levocetirizine 5 MG tablet  Commonly known as: XYZAL  Take 1 tablet (5 mg total) by mouth daily as needed for Allergies.     NIFEdipine 30 MG (OSM) 24 hr tablet  Commonly known as: PROCARDIA-XL  Take 1 tablet (30 mg total) by mouth 2 (two) times a day.     OPW TEST CLAIM - DO NOT FILL  OPW test claim. Do not fill.     SYNTHROID 200 MCG tablet  Generic drug: levothyroxine  Take 1 tablet (200 mcg total) by mouth before breakfast.     traZODone 50 MG tablet  Commonly known as: DESYREL  Take 1 to 2 tablets ( mg total) by mouth nightly as needed for Insomnia.                Signing Physician:  Elan Price MD

## 2023-08-02 ENCOUNTER — TELEPHONE (OUTPATIENT)
Dept: ADMINISTRATIVE | Facility: CLINIC | Age: 63
End: 2023-08-02
Payer: MEDICARE

## 2023-08-02 ENCOUNTER — PATIENT OUTREACH (OUTPATIENT)
Dept: ADMINISTRATIVE | Facility: OTHER | Age: 63
End: 2023-08-02
Payer: MEDICARE

## 2023-08-02 LAB
ESTIMATED AVG GLUCOSE: 105 MG/DL (ref 68–131)
HBA1C MFR BLD: 5.3 % (ref 4–5.6)

## 2023-08-02 NOTE — PROGRESS NOTES
Refused [employer] for utility help and refused transportation from [place]. States she will continue to have family help with both. No reason to follow.

## 2023-08-02 NOTE — PROGRESS NOTES
CHW - Initial Contact    This Community Health Worker completed OR updated the Social Determinant of Health questionnaire with patient via telephone today.    Pt identified barriers of most importance are: Refused TCA for utility help and refused transportation from [facility]. States she will continue to have family help with both.   Referrals to community agencies completed with patient/caregiver consent outside of [facility] include: no  Referrals were put through [facility] - no:   Support and Services: nono  Other information discussed the patient needs / wants help with: SDOH   Follow up required: no  No future outreach task assigned

## 2023-08-03 ENCOUNTER — PATIENT OUTREACH (OUTPATIENT)
Dept: ADMINISTRATIVE | Facility: OTHER | Age: 63
End: 2023-08-03
Payer: MEDICARE

## 2023-08-03 NOTE — PROGRESS NOTES
CHW - Initial Contact    This Community Health Worker completed OR updated the Social Determinant of Health questionnaire with patient via telephone today.    Pt identified barriers of most importance are: Pulmonary Hypertension referral sent for unmet needs of utility assistance.     Referrals to community agencies completed with patient/caregiver consent outside of [facility] include: no  Referrals were put through [facility] - yes:   Support and Services: Supportive Housing  Other information discussed the patient needs / wants help with: SDOH   Follow up required: yes  Initial Outreach - Due: 8/6/2023

## 2023-08-09 ENCOUNTER — HOSPITAL ENCOUNTER (OUTPATIENT)
Dept: VASCULAR SURGERY | Facility: CLINIC | Age: 63
Discharge: HOME | End: 2023-08-09
Attending: SURGERY
Payer: MEDICARE

## 2023-08-09 ENCOUNTER — OFFICE VISIT (OUTPATIENT)
Dept: VASCULAR SURGERY | Facility: CLINIC | Age: 63
End: 2023-08-09
Attending: SURGERY
Payer: MEDICARE

## 2023-08-09 VITALS
TEMPERATURE: 98 F | HEART RATE: 75 BPM | HEIGHT: 70 IN | SYSTOLIC BLOOD PRESSURE: 139 MMHG | BODY MASS INDEX: 34.72 KG/M2 | WEIGHT: 242.5 LBS | DIASTOLIC BLOOD PRESSURE: 63 MMHG

## 2023-08-09 DIAGNOSIS — N18.6 END STAGE RENAL FAILURE ON DIALYSIS: ICD-10-CM

## 2023-08-09 DIAGNOSIS — T82.898D ARTERIOVENOUS FISTULA OCCLUSION, SUBSEQUENT ENCOUNTER: Primary | ICD-10-CM

## 2023-08-09 DIAGNOSIS — Z99.2 END STAGE RENAL FAILURE ON DIALYSIS: ICD-10-CM

## 2023-08-09 DIAGNOSIS — N18.6 ESRD (END STAGE RENAL DISEASE): Primary | ICD-10-CM

## 2023-08-09 PROCEDURE — 3044F PR MOST RECENT HEMOGLOBIN A1C LEVEL <7.0%: ICD-10-PCS | Mod: HCNC,CPTII,NTX,S$GLB | Performed by: SURGERY

## 2023-08-09 PROCEDURE — 1159F MED LIST DOCD IN RCRD: CPT | Mod: HCNC,CPTII,NTX,S$GLB | Performed by: SURGERY

## 2023-08-09 PROCEDURE — 3066F NEPHROPATHY DOC TX: CPT | Mod: HCNC,CPTII,NTX,S$GLB | Performed by: SURGERY

## 2023-08-09 PROCEDURE — 3008F BODY MASS INDEX DOCD: CPT | Mod: HCNC,CPTII,NTX,S$GLB | Performed by: SURGERY

## 2023-08-09 PROCEDURE — 99214 OFFICE O/P EST MOD 30 MIN: CPT | Mod: HCNC,NTX,S$GLB, | Performed by: SURGERY

## 2023-08-09 PROCEDURE — 93990 DOPPLER FLOW TESTING: CPT | Mod: HCNC,S$GLB,TXP, | Performed by: SURGERY

## 2023-08-09 PROCEDURE — 3078F PR MOST RECENT DIASTOLIC BLOOD PRESSURE < 80 MM HG: ICD-10-PCS | Mod: HCNC,CPTII,NTX,S$GLB | Performed by: SURGERY

## 2023-08-09 PROCEDURE — 3066F PR DOCUMENTATION OF TREATMENT FOR NEPHROPATHY: ICD-10-PCS | Mod: HCNC,CPTII,NTX,S$GLB | Performed by: SURGERY

## 2023-08-09 PROCEDURE — 99999 PR PBB SHADOW E&M-EST. PATIENT-LVL III: ICD-10-PCS | Mod: PBBFAC,HCNC,TXP, | Performed by: SURGERY

## 2023-08-09 PROCEDURE — 1159F PR MEDICATION LIST DOCUMENTED IN MEDICAL RECORD: ICD-10-PCS | Mod: HCNC,CPTII,NTX,S$GLB | Performed by: SURGERY

## 2023-08-09 PROCEDURE — 3075F SYST BP GE 130 - 139MM HG: CPT | Mod: HCNC,CPTII,NTX,S$GLB | Performed by: SURGERY

## 2023-08-09 PROCEDURE — 3078F DIAST BP <80 MM HG: CPT | Mod: HCNC,CPTII,NTX,S$GLB | Performed by: SURGERY

## 2023-08-09 PROCEDURE — 93990 PR DUPLEX HEMODIALYSIS ACCESS: ICD-10-PCS | Mod: HCNC,S$GLB,TXP, | Performed by: SURGERY

## 2023-08-09 PROCEDURE — 3044F HG A1C LEVEL LT 7.0%: CPT | Mod: HCNC,CPTII,NTX,S$GLB | Performed by: SURGERY

## 2023-08-09 PROCEDURE — 3008F PR BODY MASS INDEX (BMI) DOCUMENTED: ICD-10-PCS | Mod: HCNC,CPTII,NTX,S$GLB | Performed by: SURGERY

## 2023-08-09 PROCEDURE — 99999 PR PBB SHADOW E&M-EST. PATIENT-LVL III: CPT | Mod: PBBFAC,HCNC,TXP, | Performed by: SURGERY

## 2023-08-09 PROCEDURE — 3075F PR MOST RECENT SYSTOLIC BLOOD PRESS GE 130-139MM HG: ICD-10-PCS | Mod: HCNC,CPTII,NTX,S$GLB | Performed by: SURGERY

## 2023-08-09 PROCEDURE — 99214 PR OFFICE/OUTPT VISIT, EST, LEVL IV, 30-39 MIN: ICD-10-PCS | Mod: HCNC,NTX,S$GLB, | Performed by: SURGERY

## 2023-08-09 NOTE — H&P (VIEW-ONLY)
Kathie Quezada  08/09/2023    HPI:  Patient is a 63 y.o. female with a h/o DMII with retinopathy, HTN, CVA, Factor 11 deficiency, and multiple AKIS the last was from urinary retention who is here today for f/u.  Still not yet begun on HD  She is R handed with an obese upper arm.  Pt is unsure of timeframe for needing dialysis.  Last GFR 13.9, Cr 3.6.  Hypertensive today to 180s SBP.     S/p   3/23/23  L TRA, L BVT AVF - PTA proximal diffuse stenoses with 6x80mm DCB (Lutonix) balloon    Findings/Key Components:  Successful treatment of proximal stenosis  Good palpable thrill    1/31/23  Transposed L BVT,. 2nd stage  Findings: 2nd stage L BVT AVF done; dilated to 5-6 mm with strong thrill throughout course.     12/16/22  L TRA, PTA proximal (diffuse) stenoses x3 with 6x60mm Sterlin balloon     10/27/22  Creation of 1st stage L BVT AVF     Findings/Key Components:  L basilic vein dilated to 3-4mm; segment of 8-10cm was mobilized at a/c fossa  Strong thrill  Triphasic L ulnar doppler signal with minimal augmentation with AVF compression  Obese upper arm (BMI 38)  Consider transposition in 3-6months+     No hx of MI  Hx of CHF, EF 65%%, enlarged RA   Stroke ~3 years ago, no residual deficits   Tobacco use: never     Current Outpatient Medications:     calcium acetate,phosphat bind, (PHOSLO) 667 mg capsule, Take 1 capsule (667 mg total) by mouth 3 (three) times daily with meals., Disp: 90 capsule, Rfl: 2    carvediloL (COREG) 25 MG tablet, Take 1 tablet (25 mg total) by mouth 2 (two) times daily with meals., Disp: 180 tablet, Rfl: 3    cholecalciferol, vitamin D3, 125 mcg (5,000 unit) Tab, Take 1 tablet (5,000 Units total) by mouth once daily., Disp: 90 tablet, Rfl: 3    EScitalopram oxalate (LEXAPRO) 10 MG tablet, Take 1 tablet (10 mg total) by mouth once daily., Disp: 90 tablet, Rfl: 3    ferrous gluconate (FERGON) 324 MG tablet, Take 1 tablet (324 mg total) by mouth daily with breakfast., Disp: 90 tablet, Rfl:  1    furosemide (LASIX) 40 MG tablet, Take 2 tablets (80 mg total) by mouth 2 (two) times a day., Disp: 120 tablet, Rfl: 3    levocetirizine (XYZAL) 5 MG tablet, Take 1 tablet (5 mg total) by mouth daily as needed for Allergies., Disp: 90 tablet, Rfl: 3    NIFEdipine (PROCARDIA-XL) 30 MG (OSM) 24 hr tablet, Take 1 tablet (30 mg total) by mouth 2 (two) times a day., Disp: 60 tablet, Rfl: 2    OPW TEST CLAIM - DO NOT FILL, OPW test claim. Do not fill., Disp: 1 tablet, Rfl: 0    SYNTHROID 200 mcg tablet, Take 1 tablet (200 mcg total) by mouth before breakfast., Disp: 90 tablet, Rfl: 3    traZODone (DESYREL) 50 MG tablet, Take 1 to 2 tablets ( mg total) by mouth nightly as needed for Insomnia., Disp: 120 tablet, Rfl: 3    PHYSICAL EXAM:   Right Arm BP - Sittin/63 (23 1340)  Pulse: 75  Temp: 98.2 °F (36.8 °C)                 Neck: Supple, no significant adenopathy; thyroid is not enlarged          Abdomen: Soft, nontender, nondistended, no masses or organomegaly     No rebound tenderness noted; bowel sounds normal     No groin adenopathy      Extremities:   3 cm scar overlying L antecubital fossa      Significant obesity upper arm      Proximal L BVT AVF soft thrill     LAB RESULTS:  Lab Results   Component Value Date    K 4.2 2023    K 4.5 2023    K 3.8 2023    CREATININE 4.5 (H) 2023    CREATININE 4.3 (H) 2023    CREATININE 4.5 (H) 2023     Lab Results   Component Value Date    WBC 9.14 2023    WBC 8.73 2023    WBC 8.67 2023    HCT 28.7 (L) 2023    HCT 32.4 (L) 2023    HCT 29.0 (L) 2023     (L) 2023     (L) 2023    PLT 94 (L) 2023     Lab Results   Component Value Date    HGBA1C 5.3 2023    HGBA1C 5.2 2023    HGBA1C 5.5 2022     IMAGIN2023   AVF u/s: flow vol 986 ml/min;  cm/s in mid-AVF  Diam 5 - 6.5  Depth 13 - 31mm    Prior:  U/S: flow vol 1090 ml/min    cm/s proximally and 339 cm/ds in outflow  Diam 7-8mm  Depth 7.9 - 14mm    AVF u/s: flow vol 1,326 ml/min  PSVs 688 and 597 cm/s proximally -- 'long segment narrowing beyond anastomosis'    AVF u/s: flow vol 1127 ml/min  PSVs 706 cm/s proximally -- 'long segment narrowing beyond anastomosis'    IMP/PLAN:     63 y.o. female with a h/o DMII with retinopathy, HTN, stroke and obesity (BMI 40), and multiple AKIs the last was from urinary retention -- s/p L BVT AVF (2-stages) and prior PTA diffuse proximal stenosis -- recurrent proximal single lesion  Obese L upper arm  Will plan for L TRA - to address what appears to be a new mid-AVF stenosis  8/15/23  If still cannot be used for HD, will need LUE AVG with AVF ligation in future  Does need permacath for HD soon - to Renal       Tirso Tavera MD DFSVS FACS   Vascular/Endovascular Surgery

## 2023-08-10 ENCOUNTER — OFFICE VISIT (OUTPATIENT)
Dept: NEPHROLOGY | Facility: CLINIC | Age: 63
End: 2023-08-10
Payer: MEDICARE

## 2023-08-10 ENCOUNTER — LAB VISIT (OUTPATIENT)
Dept: LAB | Facility: HOSPITAL | Age: 63
End: 2023-08-10
Attending: INTERNAL MEDICINE
Payer: MEDICARE

## 2023-08-10 VITALS
OXYGEN SATURATION: 99 % | WEIGHT: 242.5 LBS | HEART RATE: 78 BPM | HEIGHT: 70 IN | SYSTOLIC BLOOD PRESSURE: 130 MMHG | BODY MASS INDEX: 34.72 KG/M2 | DIASTOLIC BLOOD PRESSURE: 78 MMHG

## 2023-08-10 DIAGNOSIS — N18.6 ESRD (END STAGE RENAL DISEASE): ICD-10-CM

## 2023-08-10 DIAGNOSIS — N18.5 CHRONIC KIDNEY DISEASE (CKD), STAGE V: Primary | ICD-10-CM

## 2023-08-10 DIAGNOSIS — N18.5 CHRONIC KIDNEY DISEASE (CKD), STAGE V: ICD-10-CM

## 2023-08-10 LAB
ALBUMIN SERPL BCP-MCNC: 3.7 G/DL (ref 3.5–5.2)
ANION GAP SERPL CALC-SCNC: 13 MMOL/L (ref 8–16)
BASOPHILS # BLD AUTO: 0.07 K/UL (ref 0–0.2)
BASOPHILS NFR BLD: 0.6 % (ref 0–1.9)
BNP SERPL-MCNC: 237 PG/ML (ref 0–99)
BUN SERPL-MCNC: 50 MG/DL (ref 8–23)
CALCIUM SERPL-MCNC: 9.1 MG/DL (ref 8.7–10.5)
CHLORIDE SERPL-SCNC: 110 MMOL/L (ref 95–110)
CO2 SERPL-SCNC: 19 MMOL/L (ref 23–29)
CREAT SERPL-MCNC: 4.2 MG/DL (ref 0.5–1.4)
DIFFERENTIAL METHOD: ABNORMAL
EOSINOPHIL # BLD AUTO: 0.3 K/UL (ref 0–0.5)
EOSINOPHIL NFR BLD: 2.1 % (ref 0–8)
ERYTHROCYTE [DISTWIDTH] IN BLOOD BY AUTOMATED COUNT: 14.3 % (ref 11.5–14.5)
EST. GFR  (NO RACE VARIABLE): 11.5 ML/MIN/1.73 M^2
GLUCOSE SERPL-MCNC: 132 MG/DL (ref 70–110)
HCT VFR BLD AUTO: 33.6 % (ref 37–48.5)
HGB BLD-MCNC: 10.9 G/DL (ref 12–16)
IMM GRANULOCYTES # BLD AUTO: 0.06 K/UL (ref 0–0.04)
IMM GRANULOCYTES NFR BLD AUTO: 0.5 % (ref 0–0.5)
LYMPHOCYTES # BLD AUTO: 2.4 K/UL (ref 1–4.8)
LYMPHOCYTES NFR BLD: 20.6 % (ref 18–48)
MCH RBC QN AUTO: 28.1 PG (ref 27–31)
MCHC RBC AUTO-ENTMCNC: 32.4 G/DL (ref 32–36)
MCV RBC AUTO: 87 FL (ref 82–98)
MONOCYTES # BLD AUTO: 0.7 K/UL (ref 0.3–1)
MONOCYTES NFR BLD: 5.5 % (ref 4–15)
NEUTROPHILS # BLD AUTO: 8.4 K/UL (ref 1.8–7.7)
NEUTROPHILS NFR BLD: 70.7 % (ref 38–73)
NRBC BLD-RTO: 0 /100 WBC
PHOSPHATE SERPL-MCNC: 4.4 MG/DL (ref 2.7–4.5)
PLATELET # BLD AUTO: 187 K/UL (ref 150–450)
PMV BLD AUTO: 10.5 FL (ref 9.2–12.9)
POTASSIUM SERPL-SCNC: 3.9 MMOL/L (ref 3.5–5.1)
PTH-INTACT SERPL-MCNC: 160.7 PG/ML (ref 9–77)
RBC # BLD AUTO: 3.88 M/UL (ref 4–5.4)
SODIUM SERPL-SCNC: 142 MMOL/L (ref 136–145)
WBC # BLD AUTO: 11.82 K/UL (ref 3.9–12.7)

## 2023-08-10 PROCEDURE — 1160F RVW MEDS BY RX/DR IN RCRD: CPT | Mod: HCNC,CPTII,S$GLB, | Performed by: INTERNAL MEDICINE

## 2023-08-10 PROCEDURE — 3078F DIAST BP <80 MM HG: CPT | Mod: HCNC,CPTII,S$GLB, | Performed by: INTERNAL MEDICINE

## 2023-08-10 PROCEDURE — 99999 PR PBB SHADOW E&M-EST. PATIENT-LVL V: ICD-10-PCS | Mod: PBBFAC,HCNC,, | Performed by: INTERNAL MEDICINE

## 2023-08-10 PROCEDURE — 1159F PR MEDICATION LIST DOCUMENTED IN MEDICAL RECORD: ICD-10-PCS | Mod: HCNC,CPTII,S$GLB, | Performed by: INTERNAL MEDICINE

## 2023-08-10 PROCEDURE — 99215 PR OFFICE/OUTPT VISIT, EST, LEVL V, 40-54 MIN: ICD-10-PCS | Mod: HCNC,S$GLB,, | Performed by: INTERNAL MEDICINE

## 2023-08-10 PROCEDURE — 83970 ASSAY OF PARATHORMONE: CPT | Mod: HCNC,TXP | Performed by: INTERNAL MEDICINE

## 2023-08-10 PROCEDURE — 3008F BODY MASS INDEX DOCD: CPT | Mod: HCNC,CPTII,S$GLB, | Performed by: INTERNAL MEDICINE

## 2023-08-10 PROCEDURE — 3078F PR MOST RECENT DIASTOLIC BLOOD PRESSURE < 80 MM HG: ICD-10-PCS | Mod: HCNC,CPTII,S$GLB, | Performed by: INTERNAL MEDICINE

## 2023-08-10 PROCEDURE — 3044F PR MOST RECENT HEMOGLOBIN A1C LEVEL <7.0%: ICD-10-PCS | Mod: HCNC,CPTII,S$GLB, | Performed by: INTERNAL MEDICINE

## 2023-08-10 PROCEDURE — 99999 PR PBB SHADOW E&M-EST. PATIENT-LVL V: CPT | Mod: PBBFAC,HCNC,, | Performed by: INTERNAL MEDICINE

## 2023-08-10 PROCEDURE — 99215 OFFICE O/P EST HI 40 MIN: CPT | Mod: HCNC,S$GLB,, | Performed by: INTERNAL MEDICINE

## 2023-08-10 PROCEDURE — 80069 RENAL FUNCTION PANEL: CPT | Mod: HCNC,TXP | Performed by: INTERNAL MEDICINE

## 2023-08-10 PROCEDURE — 1159F MED LIST DOCD IN RCRD: CPT | Mod: HCNC,CPTII,S$GLB, | Performed by: INTERNAL MEDICINE

## 2023-08-10 PROCEDURE — 3066F NEPHROPATHY DOC TX: CPT | Mod: HCNC,CPTII,S$GLB, | Performed by: INTERNAL MEDICINE

## 2023-08-10 PROCEDURE — 83880 ASSAY OF NATRIURETIC PEPTIDE: CPT | Mod: HCNC,NTX | Performed by: INTERNAL MEDICINE

## 2023-08-10 PROCEDURE — 3066F PR DOCUMENTATION OF TREATMENT FOR NEPHROPATHY: ICD-10-PCS | Mod: HCNC,CPTII,S$GLB, | Performed by: INTERNAL MEDICINE

## 2023-08-10 PROCEDURE — 3044F HG A1C LEVEL LT 7.0%: CPT | Mod: HCNC,CPTII,S$GLB, | Performed by: INTERNAL MEDICINE

## 2023-08-10 PROCEDURE — 3075F PR MOST RECENT SYSTOLIC BLOOD PRESS GE 130-139MM HG: ICD-10-PCS | Mod: HCNC,CPTII,S$GLB, | Performed by: INTERNAL MEDICINE

## 2023-08-10 PROCEDURE — 3008F PR BODY MASS INDEX (BMI) DOCUMENTED: ICD-10-PCS | Mod: HCNC,CPTII,S$GLB, | Performed by: INTERNAL MEDICINE

## 2023-08-10 PROCEDURE — 36415 COLL VENOUS BLD VENIPUNCTURE: CPT | Mod: HCNC,NTX | Performed by: INTERNAL MEDICINE

## 2023-08-10 PROCEDURE — 3075F SYST BP GE 130 - 139MM HG: CPT | Mod: HCNC,CPTII,S$GLB, | Performed by: INTERNAL MEDICINE

## 2023-08-10 PROCEDURE — 85025 COMPLETE CBC W/AUTO DIFF WBC: CPT | Mod: HCNC,NTX | Performed by: INTERNAL MEDICINE

## 2023-08-10 PROCEDURE — 1160F PR REVIEW ALL MEDS BY PRESCRIBER/CLIN PHARMACIST DOCUMENTED: ICD-10-PCS | Mod: HCNC,CPTII,S$GLB, | Performed by: INTERNAL MEDICINE

## 2023-08-10 RX ORDER — ONDANSETRON 4 MG/1
4 TABLET, ORALLY DISINTEGRATING ORAL EVERY 12 HOURS PRN
Qty: 30 TABLET | Refills: 0 | Status: SHIPPED | OUTPATIENT
Start: 2023-08-10 | End: 2023-09-26

## 2023-08-11 ENCOUNTER — DOCUMENTATION ONLY (OUTPATIENT)
Dept: VASCULAR SURGERY | Facility: CLINIC | Age: 63
End: 2023-08-11
Payer: MEDICARE

## 2023-08-11 ENCOUNTER — TELEPHONE (OUTPATIENT)
Dept: INTERVENTIONAL RADIOLOGY/VASCULAR | Facility: CLINIC | Age: 63
End: 2023-08-11
Payer: MEDICARE

## 2023-08-11 DIAGNOSIS — N18.6 ESRD (END STAGE RENAL DISEASE): Primary | ICD-10-CM

## 2023-08-11 NOTE — PROGRESS NOTES
REASON FOR VISIT: ckd    REFERRING PHYSICIAN: Elan Price MD    HISTORY OF PRESENT ILLNESS: 63 y.o. female who has long standing DMII with retinopathy, HTN, CVA, and multiple ARUNA's including one from urinary retention. patient was referred here for abnormal renal function, her renal function has been declining due to above mentioned reasons, she used to see us in the clinic but lost follow up.    Was on insulin since joseph to around 2020, has been taken off since then.    Continues to feel fatigue, metallic taste in mouth.    ALLERGIES:  Review of patient's allergies indicates:   Allergen Reactions    Atorvastatin Other (See Comments)     Statin induced myalgia    Mushroom Hives    Peanut Anaphylaxis    Bactrim [sulfamethoxazole-trimethoprim] Hives    Gabapentin      Pruritis     Iodine     Peanut butter flavor     Penicillins Hives    Shellfish containing products Itching    Sulfa (sulfonamide antibiotics) Hives       MEDICATIONS:    Current Outpatient Medications:     calcium acetate,phosphat bind, (PHOSLO) 667 mg capsule, Take 1 capsule (667 mg total) by mouth 3 (three) times daily with meals., Disp: 90 capsule, Rfl: 2    carvediloL (COREG) 25 MG tablet, Take 1 tablet (25 mg total) by mouth 2 (two) times daily with meals., Disp: 180 tablet, Rfl: 3    cholecalciferol, vitamin D3, 125 mcg (5,000 unit) Tab, Take 1 tablet (5,000 Units total) by mouth once daily., Disp: 90 tablet, Rfl: 3    EScitalopram oxalate (LEXAPRO) 10 MG tablet, Take 1 tablet (10 mg total) by mouth once daily., Disp: 90 tablet, Rfl: 3    ferrous gluconate (FERGON) 324 MG tablet, Take 1 tablet (324 mg total) by mouth daily with breakfast., Disp: 90 tablet, Rfl: 1    furosemide (LASIX) 40 MG tablet, Take 2 tablets (80 mg total) by mouth 2 (two) times a day., Disp: 120 tablet, Rfl: 3    levocetirizine (XYZAL) 5 MG tablet, Take 1 tablet (5 mg total) by mouth daily as needed for Allergies., Disp: 90 tablet, Rfl: 3    NIFEdipine (PROCARDIA-XL) 30  MG (OSM) 24 hr tablet, Take 1 tablet (30 mg total) by mouth 2 (two) times a day., Disp: 60 tablet, Rfl: 2    OPW TEST CLAIM - DO NOT FILL, OPW test claim. Do not fill., Disp: 1 tablet, Rfl: 0    SYNTHROID 200 mcg tablet, Take 1 tablet (200 mcg total) by mouth before breakfast., Disp: 90 tablet, Rfl: 3    traZODone (DESYREL) 50 MG tablet, Take 1 to 2 tablets ( mg total) by mouth nightly as needed for Insomnia., Disp: 120 tablet, Rfl: 3    ondansetron (ZOFRAN-ODT) 4 MG TbDL, Take 1 tablet (4 mg total) by mouth every 12 (twelve) hours as needed (nausea)., Disp: 30 tablet, Rfl: 0   Medication List with Changes/Refills   New Medications    ONDANSETRON (ZOFRAN-ODT) 4 MG TBDL    Take 1 tablet (4 mg total) by mouth every 12 (twelve) hours as needed (nausea).   Current Medications    CALCIUM ACETATE,PHOSPHAT BIND, (PHOSLO) 667 MG CAPSULE    Take 1 capsule (667 mg total) by mouth 3 (three) times daily with meals.    CARVEDILOL (COREG) 25 MG TABLET    Take 1 tablet (25 mg total) by mouth 2 (two) times daily with meals.    CHOLECALCIFEROL, VITAMIN D3, 125 MCG (5,000 UNIT) TAB    Take 1 tablet (5,000 Units total) by mouth once daily.    ESCITALOPRAM OXALATE (LEXAPRO) 10 MG TABLET    Take 1 tablet (10 mg total) by mouth once daily.    FERROUS GLUCONATE (FERGON) 324 MG TABLET    Take 1 tablet (324 mg total) by mouth daily with breakfast.    FUROSEMIDE (LASIX) 40 MG TABLET    Take 2 tablets (80 mg total) by mouth 2 (two) times a day.    LEVOCETIRIZINE (XYZAL) 5 MG TABLET    Take 1 tablet (5 mg total) by mouth daily as needed for Allergies.    NIFEDIPINE (PROCARDIA-XL) 30 MG (OSM) 24 HR TABLET    Take 1 tablet (30 mg total) by mouth 2 (two) times a day.    OPW TEST CLAIM - DO NOT FILL    OPW test claim. Do not fill.    SYNTHROID 200 MCG TABLET    Take 1 tablet (200 mcg total) by mouth before breakfast.    TRAZODONE (DESYREL) 50 MG TABLET    Take 1 to 2 tablets ( mg total) by mouth nightly as needed for Insomnia.     "    PHYSICAL EXAM:  /78   Pulse 78   Ht 5' 10" (1.778 m)   Wt 110 kg (242 lb 8.1 oz)   SpO2 99%   BMI 34.80 kg/m²     General: No distress, No fever or chills  Lungs:Clear to auscultation bilaterally, No Crackles  Heart: Regular rate and rhythm, no murmur, gallops or rubs  Abdomen: Soft, no tenderness, bowel sounds normal  Extremities: Atraumatic, no edema in LE  Skin: Turgor normal. No rashes or ulcers  Neurologic: No focal weakness, oriented.  Dialysis Access: Non applicable         LABS:  Lab Results   Component Value Date    CREATININE 4.2 (H) 08/10/2023       Prot/Creat Ratio, Urine   Date Value Ref Range Status   03/27/2023 0.60 (H) 0.00 - 0.20 Final   05/27/2019 1.37 (H) 0.00 - 0.20 Final   02/21/2019 1.70 (H) 0.00 - 0.20 Final       Lab Results   Component Value Date     08/10/2023    K 3.9 08/10/2023    CO2 19 (L) 08/10/2023       last PTH   Lab Results   Component Value Date    .7 (H) 08/10/2023    CALCIUM 9.1 08/10/2023    PHOS 4.4 08/10/2023       Lab Results   Component Value Date    HGB 10.9 (L) 08/10/2023        Lab Results   Component Value Date    HGBA1C 5.3 08/01/2023       Lab Results   Component Value Date    LDLCALC 105.6 11/17/2022           ASSESSMENT:    CKD V  Left arm AV fistula - Thrill felt  Metabolic acidosis  H/0 urinary retension  -Cr baseline around 3 and progressing. 2018 - ANCA, JB, IF,HIV, cryo, Hep panel wnl (s/p Haarvoni Rx for Hep c in 2015)  -likely from DMII (with retinopathy) /uncontrolled HTN/PVD (CVA with residual weakness).  -UPCR 0.5 g/g, used to be in nephrotic range before  -Renal US showed 10.3 and 10.8 cm kidneys.  -Was on sodium bicarbonate 650 mg BID, did not need it in the recent past  -Was on Lisinopril before not sure what prompted to stop it, Dont expect it to make a lot of difference though.  -[facility] is chosen ([address] [facility] [address], [place] [address])  -Re consulted transplant.  -Continues to have metallic taste in " mouth, itching and fatigue. Referred for TDC placement.  -Called dialysis unit, her chair is still available, told them the plan to get TDC until the vascular access becomes usable.    HTN  Controlled better at home on Coreg and lasix 80 mg BID.    Anemia  -from ckd  -Hgb goal ~ 10  -S/P Iron infusions April 2023.    Secondary Hyperparathyroidism  -Phos high/Ca acceptable  -PTH trending up  -Neither calcitriol nor zemplar are being covered by insurance stating this will be bundled with dialysis, but pt not on dialysis.  -Renvela is second choice per pharmacy, ordered phos low.          Waiting for TDC to start dialysis.      Thanks for allowing me to participate in the care of this patient.     3:32 PM

## 2023-08-11 NOTE — TELEPHONE ENCOUNTER
Spoke to pt on phone, Pt is scheduled on 8/17 for IR procedure at [facility].  Preop instructions given (NPO after midnight, MUST have a ride home, Nurse will call 1-2  days before to go over instructions and medications),  pt verbally understood. Pt aware and confirmed, Thanks

## 2023-08-14 ENCOUNTER — TELEPHONE (OUTPATIENT)
Dept: VASCULAR SURGERY | Facility: CLINIC | Age: 63
End: 2023-08-14
Payer: MEDICARE

## 2023-08-14 ENCOUNTER — LAB VISIT (OUTPATIENT)
Dept: PRIMARY CARE CLINIC | Facility: CLINIC | Age: 63
End: 2023-08-14
Payer: MEDICARE

## 2023-08-14 DIAGNOSIS — N18.6 END STAGE RENAL FAILURE ON DIALYSIS: ICD-10-CM

## 2023-08-14 DIAGNOSIS — Z99.2 END STAGE RENAL FAILURE ON DIALYSIS: ICD-10-CM

## 2023-08-14 LAB
INR PPP: 1.1 (ref 0.8–1.2)
PROTHROMBIN TIME: 11.9 SEC (ref 9–12.5)

## 2023-08-14 PROCEDURE — 85610 PROTHROMBIN TIME: CPT | Mod: HCNC,TXP | Performed by: FAMILY MEDICINE

## 2023-08-14 NOTE — TELEPHONE ENCOUNTER
Spoke with the pt and informed her of the time of arrival for surgery tomorrow is 930am at the main campus on [facility],second floor [facility]. Pt reminded not to eat or dink anything after midnight and she verbalized understanding of information received.

## 2023-08-15 ENCOUNTER — ANESTHESIA (OUTPATIENT)
Dept: SURGERY | Facility: HOSPITAL | Age: 63
End: 2023-08-15
Payer: MEDICARE

## 2023-08-15 ENCOUNTER — HOSPITAL ENCOUNTER (OUTPATIENT)
Facility: HOSPITAL | Age: 63
Discharge: HOME | End: 2023-08-15
Attending: SURGERY | Admitting: SURGERY
Payer: MEDICARE

## 2023-08-15 ENCOUNTER — ANESTHESIA EVENT (OUTPATIENT)
Dept: SURGERY | Facility: HOSPITAL | Age: 63
End: 2023-08-15
Payer: MEDICARE

## 2023-08-15 VITALS
WEIGHT: 242 LBS | OXYGEN SATURATION: 100 % | TEMPERATURE: 98 F | HEIGHT: 70 IN | SYSTOLIC BLOOD PRESSURE: 168 MMHG | HEART RATE: 72 BPM | BODY MASS INDEX: 34.65 KG/M2 | RESPIRATION RATE: 16 BRPM | DIASTOLIC BLOOD PRESSURE: 77 MMHG

## 2023-08-15 DIAGNOSIS — I77.0 AVF (ARTERIOVENOUS FISTULA): ICD-10-CM

## 2023-08-15 LAB — POCT GLUCOSE: 117 MG/DL (ref 70–110)

## 2023-08-15 PROCEDURE — 36000706: Mod: HCNC,NTX | Performed by: SURGERY

## 2023-08-15 PROCEDURE — 63600175 PHARM REV CODE 636 W HCPCS: Mod: HCNC,NTX

## 2023-08-15 PROCEDURE — 27201423 OPTIME MED/SURG SUP & DEVICES STERILE SUPPLY: Mod: HCNC,TXP | Performed by: SURGERY

## 2023-08-15 PROCEDURE — 71000045 HC DOSC ROUTINE RECOVERY EA ADD'L HR: Mod: HCNC,TXP | Performed by: SURGERY

## 2023-08-15 PROCEDURE — 63600175 PHARM REV CODE 636 W HCPCS: Mod: HCNC,TXP | Performed by: NURSE ANESTHETIST, CERTIFIED REGISTERED

## 2023-08-15 PROCEDURE — D9220A PRA ANESTHESIA: ICD-10-PCS | Mod: ANES,NTX,, | Performed by: ANESTHESIOLOGY

## 2023-08-15 PROCEDURE — 71000044 HC DOSC ROUTINE RECOVERY FIRST HOUR: Mod: HCNC,TXP | Performed by: SURGERY

## 2023-08-15 PROCEDURE — 25000003 PHARM REV CODE 250: Mod: HCNC,TXP | Performed by: SURGERY

## 2023-08-15 PROCEDURE — C1725 CATH, TRANSLUMIN NON-LASER: HCPCS | Mod: HCNC,TXP | Performed by: SURGERY

## 2023-08-15 PROCEDURE — C1769 GUIDE WIRE: HCPCS | Mod: HCNC,TXP | Performed by: SURGERY

## 2023-08-15 PROCEDURE — 63600175 PHARM REV CODE 636 W HCPCS: Mod: HCNC,TXP | Performed by: SURGERY

## 2023-08-15 PROCEDURE — 36000707: Mod: HCNC,NTX | Performed by: SURGERY

## 2023-08-15 PROCEDURE — D9220A PRA ANESTHESIA: Mod: CRNA,NTX,, | Performed by: NURSE ANESTHETIST, CERTIFIED REGISTERED

## 2023-08-15 PROCEDURE — D9220A PRA ANESTHESIA: Mod: ANES,NTX,, | Performed by: ANESTHESIOLOGY

## 2023-08-15 PROCEDURE — 37000009 HC ANESTHESIA EA ADD 15 MINS: Mod: HCNC,TXP | Performed by: SURGERY

## 2023-08-15 PROCEDURE — D9220A PRA ANESTHESIA: ICD-10-PCS | Mod: CRNA,NTX,, | Performed by: NURSE ANESTHETIST, CERTIFIED REGISTERED

## 2023-08-15 PROCEDURE — 71000015 HC POSTOP RECOV 1ST HR: Mod: HCNC,TXP | Performed by: SURGERY

## 2023-08-15 PROCEDURE — 36902 INTRO CATH DIALYSIS CIRCUIT: CPT | Mod: HCNC,TXP,, | Performed by: SURGERY

## 2023-08-15 PROCEDURE — 82962 GLUCOSE BLOOD TEST: CPT | Mod: HCNC,TXP | Performed by: SURGERY

## 2023-08-15 PROCEDURE — 36902 PR INTRO CATH, DIALYSIS CIRCUIT W/TRANSLML BALLOON ANGIO: ICD-10-PCS | Mod: HCNC,TXP,, | Performed by: SURGERY

## 2023-08-15 PROCEDURE — 37000008 HC ANESTHESIA 1ST 15 MINUTES: Mod: HCNC,NTX | Performed by: SURGERY

## 2023-08-15 PROCEDURE — C1894 INTRO/SHEATH, NON-LASER: HCPCS | Mod: HCNC,NTX | Performed by: SURGERY

## 2023-08-15 PROCEDURE — 25000003 PHARM REV CODE 250: Mod: HCNC,NTX | Performed by: NURSE ANESTHETIST, CERTIFIED REGISTERED

## 2023-08-15 PROCEDURE — 25500020 PHARM REV CODE 255: Mod: HCNC,NTX | Performed by: SURGERY

## 2023-08-15 PROCEDURE — 25000003 PHARM REV CODE 250: Mod: HCNC,TXP

## 2023-08-15 RX ORDER — HYDRALAZINE HYDROCHLORIDE 20 MG/ML
INJECTION INTRAMUSCULAR; INTRAVENOUS
Status: DISCONTINUED | OUTPATIENT
Start: 2023-08-15 | End: 2023-08-15

## 2023-08-15 RX ORDER — DIPHENHYDRAMINE HYDROCHLORIDE 50 MG/ML
50 INJECTION INTRAMUSCULAR; INTRAVENOUS ONCE
Status: COMPLETED | OUTPATIENT
Start: 2023-08-15 | End: 2023-08-15

## 2023-08-15 RX ORDER — LABETALOL HYDROCHLORIDE 5 MG/ML
INJECTION, SOLUTION INTRAVENOUS
Status: DISCONTINUED | OUTPATIENT
Start: 2023-08-15 | End: 2023-08-15

## 2023-08-15 RX ORDER — HEPARIN SODIUM 1000 [USP'U]/ML
INJECTION, SOLUTION INTRAVENOUS; SUBCUTANEOUS
Status: DISCONTINUED | OUTPATIENT
Start: 2023-08-15 | End: 2023-08-15 | Stop reason: HOSPADM

## 2023-08-15 RX ORDER — MIDAZOLAM HYDROCHLORIDE 1 MG/ML
INJECTION, SOLUTION INTRAMUSCULAR; INTRAVENOUS
Status: DISCONTINUED | OUTPATIENT
Start: 2023-08-15 | End: 2023-08-15

## 2023-08-15 RX ORDER — VERAPAMIL HYDROCHLORIDE 2.5 MG/ML
INJECTION, SOLUTION INTRAVENOUS
Status: DISCONTINUED | OUTPATIENT
Start: 2023-08-15 | End: 2023-08-15 | Stop reason: HOSPADM

## 2023-08-15 RX ORDER — NITROGLYCERIN 5 MG/ML
INJECTION, SOLUTION INTRAVENOUS
Status: DISCONTINUED | OUTPATIENT
Start: 2023-08-15 | End: 2023-08-15 | Stop reason: HOSPADM

## 2023-08-15 RX ORDER — IODIXANOL 320 MG/ML
INJECTION, SOLUTION INTRAVASCULAR
Status: DISCONTINUED | OUTPATIENT
Start: 2023-08-15 | End: 2023-08-15 | Stop reason: HOSPADM

## 2023-08-15 RX ORDER — FENTANYL CITRATE 50 UG/ML
INJECTION, SOLUTION INTRAMUSCULAR; INTRAVENOUS
Status: DISCONTINUED | OUTPATIENT
Start: 2023-08-15 | End: 2023-08-15

## 2023-08-15 RX ORDER — HEPARIN SODIUM 1000 [USP'U]/ML
INJECTION, SOLUTION INTRAVENOUS; SUBCUTANEOUS
Status: DISCONTINUED | OUTPATIENT
Start: 2023-08-15 | End: 2023-08-15

## 2023-08-15 RX ORDER — MUPIROCIN 20 MG/G
OINTMENT TOPICAL
Status: DISCONTINUED | OUTPATIENT
Start: 2023-08-15 | End: 2023-08-15 | Stop reason: HOSPADM

## 2023-08-15 RX ORDER — LIDOCAINE HYDROCHLORIDE 10 MG/ML
INJECTION INFILTRATION; PERINEURAL
Status: DISCONTINUED | OUTPATIENT
Start: 2023-08-15 | End: 2023-08-15 | Stop reason: HOSPADM

## 2023-08-15 RX ADMIN — HEPARIN SODIUM 8000 UNITS: 1000 INJECTION, SOLUTION INTRAVENOUS; SUBCUTANEOUS at 12:08

## 2023-08-15 RX ADMIN — FENTANYL CITRATE 25 MCG: 50 INJECTION INTRAMUSCULAR; INTRAVENOUS at 12:08

## 2023-08-15 RX ADMIN — HYDRALAZINE HYDROCHLORIDE 10 MG: 20 INJECTION, SOLUTION INTRAMUSCULAR; INTRAVENOUS at 12:08

## 2023-08-15 RX ADMIN — FENTANYL CITRATE 50 MCG: 50 INJECTION INTRAMUSCULAR; INTRAVENOUS at 12:08

## 2023-08-15 RX ADMIN — MUPIROCIN: 20 OINTMENT TOPICAL at 09:08

## 2023-08-15 RX ADMIN — Medication 10 MG: at 12:08

## 2023-08-15 RX ADMIN — FENTANYL CITRATE 25 MCG: 50 INJECTION INTRAMUSCULAR; INTRAVENOUS at 11:08

## 2023-08-15 RX ADMIN — MIDAZOLAM 2 MG: 1 INJECTION INTRAMUSCULAR; INTRAVENOUS at 12:08

## 2023-08-15 RX ADMIN — MIDAZOLAM 2 MG: 1 INJECTION INTRAMUSCULAR; INTRAVENOUS at 11:08

## 2023-08-15 RX ADMIN — SODIUM CHLORIDE: 9 INJECTION, SOLUTION INTRAVENOUS at 11:08

## 2023-08-15 RX ADMIN — HYDROCORTISONE SODIUM SUCCINATE 200 MG: 100 INJECTION, POWDER, FOR SOLUTION INTRAMUSCULAR; INTRAVENOUS at 11:08

## 2023-08-15 RX ADMIN — DIPHENHYDRAMINE HYDROCHLORIDE 50 MG: 50 INJECTION, SOLUTION INTRAMUSCULAR; INTRAVENOUS at 11:08

## 2023-08-15 NOTE — DISCHARGE INSTRUCTIONS
DISCHARGE INSTRUCTIONS FOR ANGIOGRAM:    ACTIVITY LEVEL:  If you received sedation or an anesthetic, you may feel sleepy for several hours. Rest until you are more awake.  Gradually resume your normal activities. SPECIAL RESTRICTION: Remain on bedrest for the rest of the  day. You are allowed to get up to use the bathroom.  DIET:  You may shower or bathe as desired, in 24 hours.  CARE:  Observe insertion site for any signs of redness, swelling or discharge. You may remove the Band-Aid dressing  in 24 hours. Check the circulation in the affected leg/arm for 24 hours. You should note the color and  temperature of your leg/arm. The nurse will show you how to do this. Slight skin discoloration around the  wound is normal.  MEDICATIONS:  You may have slight tenderness at the insertion site, but no discomfort or pain should be experienced.  WHEN TO CALL THE DOCTOR:   Any obvious bleeding (some dried blood over the incision in normal).   Redness or swelling around the incision.   Fever over 101°F (38.4°C)   Drainage (pus) from the wound.  RETURN APPOINTMENT:  _________________________________________________________________________________________  FOR EMERGENCIES:  If any unusual problems or difficulties occur, contact  ___________________ or the resident at [phone] (page ) or at the Clinic office, _____________________.         AT IV infiltration site right hand:  Prop your arm or hand on pillows to help with swelling.  Ask about using ice or heat to help with pain. Your doctor may suggest you use one or both of these a few times each day until your area is healed. This is based on the type of fluids that were going through your IV.  Place an ice pack or a bag of frozen peas wrapped in a towel over the painful part. Never put ice right on the skin. Do not leave the ice on more than 10 to 15 minutes at a time.  If your doctor tells you to use heat, put a heating pad on the painful part for no more than  20 minutes at a time. Never go to sleep with a heating pad on as this can cause burns.  Protect the area from sunlight.  When do I need to call the doctor?   Signs of infection. These include a fever of 100.4°F (38°C) or higher, chills, or wound that will not heal.  Signs of wound infection. These include swelling, redness, warmth around the wound; too much pain when touched.  Skin changes color, peels, flakes, or blisters  Not able to move the affected limb

## 2023-08-15 NOTE — PLAN OF CARE
Notified Dr Villagran, anesthesia,  of possible need for PIV placement. MD verbalized understanding.

## 2023-08-15 NOTE — OP NOTE
Date: 08/15/2023    Surgeon:  Tirso Tavera MD DFSVS FACS    Assitant: ASIM Martinez MD    Pre-op Diagnosis:  Threatended AVF; evidence of stenosis  T82.858A: Stenosis of vascular prosthetic devices, implants and grafts, initial encounter    Post-op Diagnosis:  Same    Procedure(s):  1) U/S-guided L transradial access; L BVT AVF fistulagram  2) PTA proximal stenoses x2 with 6x40mm Mehran balloon; outflow stenosis with 6x40mm Mehran balloon    Anesthesia: Local MAC    Findings/Key Components:  Successful treatment of proximal stenosis > 75%  Good palpable thrill    EBL: < 10 ml    Anesthesia: IV regional    Findings: Resolution of stenosis; palpable thrill           Complications:  None; patient tolerated the procedure well.           Disposition: Recoery- hemodynamically stable in good condition    Attending Attestation: I was present and scrubbed for the entire procedure.    Procedure detail: The patient was brought to the interventional suite, placed in supine. Arm was prepped and draped in the standard surgical fashion. Under ultrasound guidance, the radial artery was accessed with a micropuncture needle; ultrasound confirmed vessel patency, followed by placement of 4/3-Belarusian micropuncture dilator. Through this, an 0.018-inch wire was placed in a 5/6-Belarusian transradial sheath. Through this, an 0.018-inch Glidewire was placed through the high-grade stenosis, which was demonstrated by the angiogram.  The ultrasound demonstrated vessel patency. A high-grade stenosis in proximal x2 was found, which was crossed with a hyrophilic 0.018 in glidewire. This was treated first with PTA proximal stenoses x2 with 6x40mm Mehran balloon; outflow stenosis with 6x40mm Mehran balloon high-pressure, noncompliant balloon. Resolution of the stenosis was noted. Strong thrill could be felt. The sheath was removed and a trans-radial artery band placed with good hemostasis; hand was well-perfused and thrill palpable.

## 2023-08-15 NOTE — ANESTHESIA PREPROCEDURE EVALUATION
08/15/2023  Pre-operative evaluation for Procedure(s) (LRB):  FISTULOGRAM, WITH PTA (Left)    Kathie Quezada is a 63 y.o. female     Patient Active Problem List   Diagnosis    Postoperative hypothyroidism    Renovascular hypertension    Mixed hyperlipidemia    Nephrotic range proteinuria    History of hepatitis C    Chronic diastolic heart failure    Factor XI deficiency    Vitamin D deficiency    S/P arteriovenous (AV) fistula creation    Generalized anxiety disorder    Class 2 severe obesity due to excess calories with serious comorbidity and body mass index (BMI) of 35.0 to 35.9 in adult    Diabetic polyneuropathy associated with type 2 diabetes mellitus    Controlled type 2 diabetes mellitus with stable proliferative retinopathy of both eyes, unspecified whether long term insulin use    Type 2 diabetes mellitus with stage 5 chronic kidney disease not on chronic dialysis, without long-term current use of insulin    Myalgia due to statin    Chronic kidney disease (CKD), stage V    Osteopenia of multiple sites    Chronic allergic rhinitis    Iron deficiency anemia    Metabolic bone disease    Pulmonary hypertension    Organ transplant candidate    Clotted dialysis access    Anemia in stage 5 chronic kidney disease, not on chronic dialysis    End stage renal failure on dialysis       Review of patient's allergies indicates:   Allergen Reactions    Atorvastatin Other (See Comments)     Statin induced myalgia    Mushroom Hives    Peanut Anaphylaxis    Bactrim [sulfamethoxazole-trimethoprim] Hives    Gabapentin      Pruritis     Iodine     Peanut butter flavor     Penicillins Hives    Shellfish containing products Itching    Sulfa (sulfonamide antibiotics) Hives       Current Facility-Administered Medications on File Prior to Visit   Medication Dose Route Frequency  Provider Last Rate Last Admin    mupirocin 2 % ointment   Nasal On Call Procedure Ga Beatty MD   Given at 08/15/23 0981     Current Outpatient Medications on File Prior to Visit   Medication Sig Dispense Refill    calcium acetate,phosphat bind, (PHOSLO) 667 mg capsule Take 1 capsule (667 mg total) by mouth 3 (three) times daily with meals. 90 capsule 2    carvediloL (COREG) 25 MG tablet Take 1 tablet (25 mg total) by mouth 2 (two) times daily with meals. 180 tablet 3    cholecalciferol, vitamin D3, 125 mcg (5,000 unit) Tab Take 1 tablet (5,000 Units total) by mouth once daily. 90 tablet 3    EScitalopram oxalate (LEXAPRO) 10 MG tablet Take 1 tablet (10 mg total) by mouth once daily. (Patient taking differently: Take 10 mg by mouth every evening.) 90 tablet 3    ferrous gluconate (FERGON) 324 MG tablet Take 1 tablet (324 mg total) by mouth daily with breakfast. 90 tablet 1    furosemide (LASIX) 40 MG tablet Take 2 tablets (80 mg total) by mouth 2 (two) times a day. 120 tablet 3    levocetirizine (XYZAL) 5 MG tablet Take 1 tablet (5 mg total) by mouth daily as needed for Allergies. 90 tablet 3    NIFEdipine (PROCARDIA-XL) 30 MG (OSM) 24 hr tablet Take 1 tablet (30 mg total) by mouth 2 (two) times a day. 60 tablet 2    ondansetron (ZOFRAN-ODT) 4 MG TbDL Take 1 tablet (4 mg total) by mouth every 12 (twelve) hours as needed (nausea). 30 tablet 0    OPW TEST CLAIM - DO NOT FILL OPW test claim. Do not fill. 1 tablet 0    SYNTHROID 200 mcg tablet Take 1 tablet (200 mcg total) by mouth before breakfast. 90 tablet 3    traZODone (DESYREL) 50 MG tablet Take 1 to 2 tablets ( mg total) by mouth nightly as needed for Insomnia. 120 tablet 3       Past Surgical History:   Procedure Laterality Date    AV FISTULA PLACEMENT Left 10/27/2022    Procedure: CREATION, AV FISTULA;  Surgeon: Tirso Tavera MD;  Location: [facility] OR [address] [place] [address];  Service: Peripheral Vascular;  Laterality: Left;  Brachio basilic 1st stage      BACK SURGERY  2017    CHOLECYSTECTOMY  2015    [place]    COLONOSCOPY  11/13/2015    Dr Chino torres prep. internal hemorrhoids. diverticulosis of sigmois. NO polyps. repeat due 2025    FISTULOGRAM N/A 12/16/2022    Procedure: Fistulogram;  Surgeon: Tirso Tavera MD;  Location: [facility] CATH LAB;  Service: Peripheral Vascular;  Laterality: N/A;    FISTULOGRAM, WITH PTA Left 3/23/2023    Procedure: FISTULOGRAM, WITH PTA;  Surgeon: Tirso Tavera MD;  Location: [facility] OR 2ND FLR;  Service: Peripheral Vascular;  Laterality: Left;  LUE TRANSRADIAL CO2 FISTULOGRAM    1.9 min  22.99 mGy  4.3032 Gy.cm  200cc CO2  9ml TransRadial Solution    FIXATION KYPHOPLASTY Bilateral 08/14/2019    Procedure: L2 kyphoplasty Fluoro Globus;  Surgeon: Jeremie Kwon DO;  Location: [facility];  Service: Neurosurgery;  Laterality: Bilateral;  GLOBUS MACRINA WINSLOW [phone] TEXTED HER @ 10:54AM ON 7-30-19  PRE-OP-BY RN  8-7-2019    HYSTERECTOMY      OOPHORECTOMY  1996    one    PERCUTANEOUS TRANSLUMINAL ANGIOPLASTY OF ARTERIOVENOUS FISTULA Left 12/16/2022    Procedure: PTA, AV FISTULA;  Surgeon: Tirso Tavera MD;  Location: [facility] CATH LAB;  Service: Peripheral Vascular;  Laterality: Left;    RIGHT HEART CATHETERIZATION Right 6/15/2023    Procedure: INSERTION, CATHETER, RIGHT HEART;  Surgeon: Lucretia Rivas MD;  Location: [facility] CATH LAB;  Service: Cardiology;  Laterality: Right;    THY[facility]ID[facility]Y  2016    [facility] nodule    TONSILLECTOMY      TRANSPOSITION OF BASILIC VEIN Left 1/31/2023    Procedure: TRANSPOSITION, VEIN, BASILIC;  Surgeon: Tirso Tavera MD;  Location: [facility] OR 2ND FLR;  Service: Peripheral Vascular;  Laterality: Left;       Social History     Socioeconomic History    Marital status:    Tobacco Use    Smoking status: Never     Passive exposure: Past    Smokeless tobacco: Never   Substance and Sexual Activity    Alcohol use: Not Currently    Drug use: No    Sexual activity: Not  "Currently   Social History Narrative    Pt enjoys cooking and baking and making Floral arrangement and Kerline [employer]      Social Determinants of Health     Financial Resource Strain: Medium Risk (8/7/2023)    Overall Financial Resource Strain (CARDIA)     Difficulty of Paying Living Expenses: Somewhat hard   Food Insecurity: Food Insecurity Present (8/7/2023)    Hunger Vital Sign     Worried About Running Out of Food in the Last Year: Sometimes true     Ran Out of Food in the Last Year: Sometimes true   Transportation Needs: No Transportation Needs (8/7/2023)    PRAPARE - Transportation     Lack of Transportation (Medical): No     Lack of Transportation (Non-Medical): No   Recent Concern: Transportation Needs - Unmet Transportation Needs (8/3/2023)    PRAPARE - Transportation     Lack of Transportation (Medical): Yes     Lack of Transportation (Non-Medical): Yes   Physical Activity: Insufficiently Active (8/7/2023)    Exercise Vital Sign     Days of Exercise per Week: 3 days     Minutes of Exercise per Session: 30 min   Stress: No Stress Concern Present (8/7/2023)    [employer] - Occupational Stress Questionnaire     Feeling of Stress : Only a little   Social Connections: Unknown (8/7/2023)    Social Connection and Isolation Panel [NHANES]     Frequency of Communication with Friends and Family: More than three times a week     Frequency of Social Gatherings with Friends and Family: More than three times a week     Attends Holiness Services: Patient refused     Active Member of Clubs or Organizations: Patient refused     Attends Club or Organization Meetings: Never     Marital Status:    Housing Stability: Low Risk  (8/7/2023)    Housing Stability Vital Sign     Unable to Pay for Housing in the Last Year: No     Number of Places Lived in the Last Year: 1     Unstable Housing in the Last Year: No         CBC: No results for input(s): "WBC", "RBC", "HGB", "HCT", " ""PLT", "MCV", "MCH", "MCHC" in the last 72 hours.    CMP: No results for input(s): "NA", "K", "CL", "CO2", "BUN", "CREATININE", "GLU", "MG", "PHOS", "CALCIUM", "ALBUMIN", "PROT", "ALKPHOS", "ALT", "AST", "BILITOT" in the last 72 hours.    INR  Recent Labs     23  1109   INR 1.1           Diagnostic Studies:      EKD Echo:  No results found for this or any previous visit.        Pre-op Assessment    I have reviewed the Patient Summary Reports.     I have reviewed the Nursing Notes. I have reviewed the NPO Status.      Review of Systems  Anesthesia Hx:  No problems with previous Anesthesia  History of prior surgery of interest to airway management or planning: Denies Family Hx of Anesthesia complications.   Denies Personal Hx of Anesthesia complications.   Cardiovascular:   Hypertension    Pulmonary:   Denies COPD.  Denies Asthma.  Denies Sleep Apnea.    Renal/:   Chronic Renal Disease    Hepatic/GI:   GERD Liver Disease,    Neurological:   CVA Neuromuscular Disease,    Endocrine:   Diabetes Hypothyroidism    Psych:   Psychiatric History          Physical Exam  General: Well nourished, Cooperative and Alert    Airway:  Mallampati: III / II  Mouth Opening: Normal  TM Distance: Normal  Tongue: Normal  Neck ROM: Normal ROM    Dental:  Intact    Chest/Lungs:  Clear to auscultation    Heart:  Rate: Normal  Rhythm: Regular Rhythm  Sounds: Normal        Anesthesia Plan  Type of Anesthesia, risks & benefits discussed:    Anesthesia Type: Gen Natural Airway  Intra-op Monitoring Plan: Standard ASA Monitors  Post Op Pain Control Plan: multimodal analgesia  Induction:  IV  Informed Consent: Informed consent signed with the Patient and all parties understand the risks and agree with anesthesia plan.  All questions answered.   ASA Score: 3  Day of Surgery Review of History & Physical: H&P Update referred to the surgeon/provider.    Ready For Surgery From Anesthesia Perspective.     .        "

## 2023-08-15 NOTE — TRANSFER OF CARE
"Anesthesia Transfer of Care Note    Patient: Kathie Quezada    Procedure(s) Performed: Procedure(s) (LRB):  FISTULOGRAM, WITH PTA TRANSRADIAL (Left)    Patient location: [facility]    Anesthesia Type: MAC    Transport from OR: Transported from OR on room air with adequate spontaneous ventilation    Post pain: adequate analgesia    Post assessment: no apparent anesthetic complications    Post vital signs: stable    Level of consciousness: alert    Nausea/Vomiting: no nausea/vomiting    Complications: none    Transfer of care protocol was followed      Last vitals:   Visit Vitals  BP (!) 140/78 (BP Location: Right arm, Patient Position: Sitting)   Pulse 74   Temp 36.6 °C (97.8 °F) (Temporal)   Resp 16   Ht 5' 10" (1.778 m)   Wt 109.8 kg (242 lb)   SpO2 100%   Breastfeeding No   BMI 34.72 kg/m²     "

## 2023-08-15 NOTE — BRIEF OP NOTE
Brief Operative Note  Date: 08/15/2023    Surgeon:  Tirso Tavera MD [facility]    Assitant: ASIM Martinez MD    Pre-op Diagnosis:  Threatended AVF; evidence of stenosis  T82.858A: Stenosis of vascular prosthetic devices, implants and grafts, initial encounter    Post-op Diagnosis:  Same    Procedure(s):  1) U/S-guided L transradial access; L BVT AVF fistulagram  2) PTA proximal stenoses x2 with 6x40mm Mehran balloon; outflow stenosis with 6x40mm Mehran balloon    Anesthesia: Local MAC    Findings/Key Components:  Successful treatment of proximal stenosis  Good palpable thrill    EBL: < 10 ml    Anesthesia: IV regional    Findings: Resolution of stenosis; palpable thrill           Complications:  None; patient tolerated the procedure well.           Disposition: Recoery- hemodynamically stable in good condition    Attending Attestation: I was present and scrubbed for the entire procedure.      Specimens (From admission, onward)      None          I attest to being present for the procedure and performing the case.  Tirso Tavera MD [facility] FACS  Discharge Note  SUMMARY    Admit Date: 8/15/2023    Attending Physician: Tirso Tavera MD [facility]    Discharge Physician: Tirso Tavera MD [facility]    Discharge Date: 08/15/2023    Final Diagnosis: Arteriovenous fistula occlusion, subsequent encounter [T82.898D]    Outcome of Treatment: Successful PTA    Disposition: Home or self-care    Patient Instructions:   Current Discharge Medication List        CONTINUE these medications which have NOT CHANGED    Details   calcium acetate,phosphat bind, (PHOSLO) 667 mg capsule Take 1 capsule (667 mg total) by mouth 3 (three) times daily with meals.  Qty: 90 capsule, Refills: 2    Associated Diagnoses: Secondary hyperparathyroidism of renal origin      carvediloL (COREG) 25 MG tablet Take 1 tablet (25 mg total) by mouth 2 (two) times daily with meals.  Qty: 180 tablet, Refills: 3    Comments: .  Associated Diagnoses: Chronic diastolic heart  failure; Renovascular hypertension      cholecalciferol, vitamin D3, 125 mcg (5,000 unit) Tab Take 1 tablet (5,000 Units total) by mouth once daily.  Qty: 90 tablet, Refills: 3    Associated Diagnoses: Vitamin D deficiency      EScitalopram oxalate (LEXAPRO) 10 MG tablet Take 1 tablet (10 mg total) by mouth once daily.  Qty: 90 tablet, Refills: 3    Associated Diagnoses: Generalized anxiety disorder      ferrous gluconate (FERGON) 324 MG tablet Take 1 tablet (324 mg total) by mouth daily with breakfast.  Qty: 90 tablet, Refills: 1    Associated Diagnoses: Iron deficiency anemia, unspecified iron deficiency anemia type      furosemide (LASIX) 40 MG tablet Take 2 tablets (80 mg total) by mouth 2 (two) times a day.  Qty: 120 tablet, Refills: 3    Associated Diagnoses: Chronic diastolic heart failure; Chronic kidney disease (CKD), stage V      NIFEdipine (PROCARDIA-XL) 30 MG (OSM) 24 hr tablet Take 1 tablet (30 mg total) by mouth 2 (two) times a day.  Qty: 60 tablet, Refills: 2    Comments: .      ondansetron (ZOFRAN-ODT) 4 MG TbDL Take 1 tablet (4 mg total) by mouth every 12 (twelve) hours as needed (nausea).  Qty: 30 tablet, Refills: 0    Associated Diagnoses: Chronic kidney disease (CKD), stage V      SYNTHROID 200 mcg tablet Take 1 tablet (200 mcg total) by mouth before breakfast.  Qty: 90 tablet, Refills: 3    Associated Diagnoses: Postoperative hypothyroidism      traZODone (DESYREL) 50 MG tablet Take 1 to 2 tablets ( mg total) by mouth nightly as needed for Insomnia.  Qty: 120 tablet, Refills: 3    Associated Diagnoses: Generalized anxiety disorder      levocetirizine (XYZAL) 5 MG tablet Take 1 tablet (5 mg total) by mouth daily as needed for Allergies.  Qty: 90 tablet, Refills: 3    Associated Diagnoses: Chronic allergic rhinitis      OPW TEST CLAIM - DO NOT FILL OPW test claim. Do not fill.  Qty: 1 tablet, Refills: 0             Diet:  Resume pre-operative diet    Activity:  Ad  oli    Follow-up:  Follow-up in clinic with Dr Tavera within 6weeks; please call clinic nurse at

## 2023-08-15 NOTE — PLAN OF CARE
Pt awake and alert.  Pain tolerable and procedure/surgery site stable.  Discharge instructions reviewed and handout given.  Pt states ready to go home. Puncture site no bleeding.  Slight bruise - not enlarge since removal of TR band

## 2023-08-16 ENCOUNTER — TELEPHONE (OUTPATIENT)
Dept: INTERVENTIONAL RADIOLOGY/VASCULAR | Facility: HOSPITAL | Age: 63
End: 2023-08-16
Payer: MEDICARE

## 2023-08-16 LAB — POCT GLUCOSE: 98 MG/DL (ref 70–110)

## 2023-08-16 NOTE — ANESTHESIA POSTPROCEDURE EVALUATION
Anesthesia Post Evaluation    Patient: Kathie Quezada    Procedure(s) Performed: Procedure(s) (LRB):  FISTULOGRAM, WITH PTA TRANSRADIAL (Left)    Final Anesthesia Type: MAC      Patient location during evaluation: PACU  Patient participation: Yes- Able to Participate  Level of consciousness: awake and alert  Post-procedure vital signs: reviewed and stable  Pain management: adequate  Airway patency: patent    PONV status at discharge: No PONV  Anesthetic complications: no      Cardiovascular status: blood pressure returned to baseline  Respiratory status: unassisted  Hydration status: euvolemic  Follow-up not needed.          Vitals Value Taken Time   /77 08/15/23 1431   Temp 36.6 °C (97.8 °F) 08/15/23 1254   Pulse 74 08/15/23 1444   Resp 28 08/15/23 1444   SpO2 100 % 08/15/23 1444   Vitals shown include unvalidated device data.      No case tracking events are documented in the log.      Pain/Neda Score: Neda Score: 10 (8/15/2023  2:54 PM)

## 2023-08-16 NOTE — NURSING
Pre-procedure call complete.  Pt instructed not to eat or drink anything after midnight the night before procedure.  Pt aware will need someone to provide transport home and monitor pt post procedure.  No driving for at least 24 hours after procedure.   Medications reviewed.  Arrival time and location given. Covid screening completed.  Pt verbalized understanding.

## 2023-08-17 ENCOUNTER — ANESTHESIA EVENT (OUTPATIENT)
Dept: ANESTHESIOLOGY | Facility: HOSPITAL | Age: 63
End: 2023-08-17
Payer: MEDICARE

## 2023-08-17 ENCOUNTER — ANESTHESIA (OUTPATIENT)
Dept: ANESTHESIOLOGY | Facility: HOSPITAL | Age: 63
End: 2023-08-17
Payer: MEDICARE

## 2023-08-17 ENCOUNTER — HOSPITAL ENCOUNTER (OUTPATIENT)
Dept: INTERVENTIONAL RADIOLOGY/VASCULAR | Facility: HOSPITAL | Age: 63
Discharge: HOME | End: 2023-08-17
Admitting: RADIOLOGY
Payer: MEDICARE

## 2023-08-17 VITALS
RESPIRATION RATE: 21 BRPM | OXYGEN SATURATION: 100 % | WEIGHT: 242 LBS | HEIGHT: 70 IN | TEMPERATURE: 99 F | HEART RATE: 80 BPM | BODY MASS INDEX: 34.65 KG/M2 | DIASTOLIC BLOOD PRESSURE: 87 MMHG | SYSTOLIC BLOOD PRESSURE: 195 MMHG

## 2023-08-17 DIAGNOSIS — N18.6 END STAGE RENAL FAILURE ON DIALYSIS: Primary | ICD-10-CM

## 2023-08-17 DIAGNOSIS — Z99.2 END STAGE RENAL FAILURE ON DIALYSIS: Primary | ICD-10-CM

## 2023-08-17 DIAGNOSIS — N18.6 ESRD (END STAGE RENAL DISEASE): ICD-10-CM

## 2023-08-17 LAB
GLUCOSE SERPL-MCNC: 92 MG/DL (ref 70–110)
POCT GLUCOSE: 82 MG/DL (ref 70–110)

## 2023-08-17 PROCEDURE — 99152 MOD SED SAME PHYS/QHP 5/>YRS: CPT | Mod: NTX

## 2023-08-17 PROCEDURE — 99152 MOD SED SAME PHYS/QHP 5/>YRS: CPT | Mod: ,,, | Performed by: RADIOLOGY

## 2023-08-17 PROCEDURE — 77001 CHG FLUOROGUIDE CNTRL VEN ACCESS,PLACE,REPLACE,REMOVE: ICD-10-PCS | Mod: 26,NTX,, | Performed by: RADIOLOGY

## 2023-08-17 PROCEDURE — C1769 GUIDE WIRE: HCPCS | Mod: NTX

## 2023-08-17 PROCEDURE — 99152 PR MOD CONSCIOUS SEDATION, SAME PHYS, 5+ YRS, FIRST 15 MIN: ICD-10-PCS | Mod: ,,, | Performed by: RADIOLOGY

## 2023-08-17 PROCEDURE — 77001 FLUOROGUIDE FOR VEIN DEVICE: CPT | Mod: 26,NTX,, | Performed by: RADIOLOGY

## 2023-08-17 PROCEDURE — 36000 PLACE NEEDLE IN VEIN: CPT | Mod: HCNC,TXP | Performed by: STUDENT IN AN ORGANIZED HEALTH CARE EDUCATION/TRAINING PROGRAM

## 2023-08-17 PROCEDURE — 82962 GLUCOSE BLOOD TEST: CPT | Mod: TXP

## 2023-08-17 PROCEDURE — 25000003 PHARM REV CODE 250: Mod: TXP | Performed by: RADIOLOGY

## 2023-08-17 PROCEDURE — 36410 PERIPHERAL IV INSERTION: ICD-10-PCS | Mod: ,,, | Performed by: STUDENT IN AN ORGANIZED HEALTH CARE EDUCATION/TRAINING PROGRAM

## 2023-08-17 PROCEDURE — 76937 US GUIDE VASCULAR ACCESS: CPT | Mod: TC | Performed by: RADIOLOGY

## 2023-08-17 PROCEDURE — 36558 INSERT TUNNELED CV CATH: CPT | Mod: TXP

## 2023-08-17 PROCEDURE — C1750 CATH, HEMODIALYSIS,LONG-TERM: HCPCS | Mod: TXP

## 2023-08-17 PROCEDURE — 99152 MOD SED SAME PHYS/QHP 5/>YRS: CPT | Performed by: RADIOLOGY

## 2023-08-17 PROCEDURE — 36410 VNPNXR 3YR/> PHY/QHP DX/THER: CPT | Mod: ,,, | Performed by: STUDENT IN AN ORGANIZED HEALTH CARE EDUCATION/TRAINING PROGRAM

## 2023-08-17 PROCEDURE — C1894 INTRO/SHEATH, NON-LASER: HCPCS | Mod: NTX

## 2023-08-17 PROCEDURE — 76937 PR  US GUIDE, VASCULAR ACCESS: ICD-10-PCS | Mod: 26,NTX,, | Performed by: RADIOLOGY

## 2023-08-17 PROCEDURE — 63600175 PHARM REV CODE 636 W HCPCS: Mod: TXP | Performed by: RADIOLOGY

## 2023-08-17 PROCEDURE — 36558 INSERT TUNNELED CV CATH: CPT | Mod: RT,NTX | Performed by: RADIOLOGY

## 2023-08-17 PROCEDURE — 76937 US GUIDE VASCULAR ACCESS: CPT | Mod: 26,NTX,, | Performed by: RADIOLOGY

## 2023-08-17 PROCEDURE — 36558 IR TUNNELED CATHETER INSERTION W/O PORT: ICD-10-PCS | Mod: RT,NTX,, | Performed by: RADIOLOGY

## 2023-08-17 PROCEDURE — 77001 FLUOROGUIDE FOR VEIN DEVICE: CPT | Mod: TC | Performed by: RADIOLOGY

## 2023-08-17 RX ORDER — LIDOCAINE HYDROCHLORIDE 10 MG/ML
1 INJECTION, SOLUTION EPIDURAL; INFILTRATION; INTRACAUDAL; PERINEURAL ONCE
Status: DISCONTINUED | OUTPATIENT
Start: 2023-08-17 | End: 2023-08-18 | Stop reason: HOSPADM

## 2023-08-17 RX ORDER — SODIUM CHLORIDE 9 MG/ML
INJECTION, SOLUTION INTRAVENOUS CONTINUOUS
Status: DISCONTINUED | OUTPATIENT
Start: 2023-08-17 | End: 2023-08-18 | Stop reason: HOSPADM

## 2023-08-17 RX ORDER — LIDOCAINE HYDROCHLORIDE 20 MG/ML
INJECTION, SOLUTION INFILTRATION; PERINEURAL
Status: COMPLETED | OUTPATIENT
Start: 2023-08-17 | End: 2023-08-17

## 2023-08-17 RX ORDER — HYDRALAZINE HYDROCHLORIDE 20 MG/ML
10 INJECTION INTRAMUSCULAR; INTRAVENOUS ONCE
Status: COMPLETED | OUTPATIENT
Start: 2023-08-17 | End: 2023-08-17

## 2023-08-17 RX ORDER — FENTANYL CITRATE 50 UG/ML
INJECTION, SOLUTION INTRAMUSCULAR; INTRAVENOUS
Status: COMPLETED | OUTPATIENT
Start: 2023-08-17 | End: 2023-08-17

## 2023-08-17 RX ORDER — MIDAZOLAM HYDROCHLORIDE 1 MG/ML
INJECTION INTRAMUSCULAR; INTRAVENOUS
Status: COMPLETED | OUTPATIENT
Start: 2023-08-17 | End: 2023-08-17

## 2023-08-17 RX ADMIN — HYDRALAZINE HYDROCHLORIDE 10 MG: 20 INJECTION, SOLUTION INTRAMUSCULAR; INTRAVENOUS at 12:08

## 2023-08-17 RX ADMIN — LIDOCAINE HYDROCHLORIDE 10 ML: 20 INJECTION, SOLUTION INFILTRATION; PERINEURAL at 11:08

## 2023-08-17 RX ADMIN — FENTANYL CITRATE 50 MCG: 0.05 INJECTION, SOLUTION INTRAMUSCULAR; INTRAVENOUS at 11:08

## 2023-08-17 NOTE — PLAN OF CARE
Discharge instructions given and explained to patient and family with verbalization of understanding all instructions. Patients v/s stable, denies n/v and tolerating po, rates pain level tolerable, IV removed, and family at bedside for patient discharge home.      Per Dr. Valles, may discharge pt with SBP <200 and asymptomatic. Instruct pt to resume home BP meds today and return to ED if symptoms worsen. Pt & pt's daughter instructed and verbalized understanding.

## 2023-08-17 NOTE — ANESTHESIA PROCEDURE NOTES
Peripheral IV Insertion    Diagnosis: I99.8 Other disorder of circulatory system  Doctor requesting consult: Reena    Patient location during procedure: holding area    Staffing  Authorizing Provider: Armando Hull MD  Performing Provider: Armando Hull MD    Staffing  Performed by: Armando Hull MD  Authorized by: Armando Hull MD    Anesthesiologist was present at the time of the procedure.    Preanesthetic Checklist  Completed: patient identifiedPeripheral IV Insertion  Skin Prep: chlorhexidine gluconate and isopropyl alcohol  Local Infiltration: none  Orientation: right  Location: forearm  Catheter Type: peripheral IV (single lumen)  Catheter Size: 22 G  Catheter placement by Ultrasound guidance and Anatomical landmarks. Heme positive aspiration all ports.   Vessel Caliber: small, patent, compressibility poor  Vascular Doppler:  not done  Needle advanced into vessel with real time Ultrasound guidance.  Guidewire confirmed in vessel.Insertion Attempts: 2  Assessment  Dressing: secured with tape and tegaderm  Patient: Tolerated well  Line flushed easily.  Additional Notes    I was asked to start the IV by the patient due to vascular insufficiency and history of infiltrated IVs earlier this week.      Attempted basilic vein at the antecubital fossa, but vessel had poor compressibility and difficult to advance into vein due to easy collapsibility.      For second attempt, went to wrist.

## 2023-08-17 NOTE — H&P
Radiology History & Physical      SUBJECTIVE:     Chief Complaint: Renal failure    History of Present Illness:  Kathie Quezada is a 63 y.o. female with renal failure who presents for tunneled HD catheter placement.    Past Medical History:   Diagnosis Date    Acute respiratory failure with hypoxia and hypercapnia 05/26/2019    ATN (acute tubular necrosis) 02/08/2019    Chronic diastolic heart failure 02/07/2019    2-8-19 Mild left atrial enlargement. Mild left ventricular enlargement. Normal left ventricular systolic function. The estimated ejection fraction is 55% Indeterminate left ventricular diastolic function. Normal right ventricular systolic function. Mild mitral regurgitation. Mild to moderate tricuspid regurgitation. The estimated PA systolic pressure is 32 mm Hg Normal central venous pressure (3 m    CKD (chronic kidney disease) stage 4, GFR 15-29 ml/min 05/16/2018    CKD stage G3a/A3, GFR 45-59 and albumin creatinine ratio >300 mg/g 05/16/2018    CKD stage G3b/A3, GFR 30-44 and albumin creatinine ratio >300 mg/g 05/16/2018    Closed compression fracture of L2 lumbar vertebra 05/24/2019    Closed compression fracture of L2 lumbar vertebra 05/24/2019     IMO Regulatory Load October 2019    Controlled type 2 diabetes with retinopathy 04/09/2018    COVID-19 virus infection 7-1-2021 07/01/2021    CVA (cerebral vascular accident) 2017    Diabetic polyneuropathy associated with type 2 diabetes mellitus 12/04/2020    Encounter for blood transfusion     Essential hypertension 04/09/2018    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  7-31-19 Factor XI Activity 55 - 145 % 96       Gastroesophageal reflux disease without esophagitis 05/24/2019    Generalized anxiety disorder 11/08/2019    GERD (gastroesophageal reflux disease)     GIB (gastrointestinal bleeding) 10/18/2018    History of CVA (cerebrovascular accident) without residual deficits 11/27/2022    History of hepatitis C     s/p  succesful Rx w/ [facility]  - [id number] (cure) 2016    Hypertensive retinopathy, bilateral 12/10/2019    Mild nonproliferative diabetic retinopathy 05/02/2018    Mixed hyperlipidemia 04/09/2018    Myalgia due to statin 09/07/2022    Normocytic anemia 01/14/2022    NS (nuclear sclerosis), bilateral 12/10/2019    Peanut allergy 2/4/2023    PNA (pneumonia) 05/28/2019    Postoperative hypothyroidism     Renovascular hypertension 04/09/2018    S/P kyphoplasty 08/14/2019 8/14/19: L2 kyphoplasty with Dr. Kwon     Slow transit constipation 05/27/2019    Type 2 diabetes mellitus with circulatory disorder, with long-term current use of insulin 04/09/2018    Type 2 diabetes mellitus with stage 3 chronic kidney disease, without long-term current use of insulin 04/09/2018    Type 2 diabetes mellitus with stage 3a chronic kidney disease, without long-term current use of insulin 03/03/2021    Type 2 diabetes mellitus with stage 3b chronic kidney disease, without long-term current use of insulin 09/07/2022     Past Surgical History:   Procedure Laterality Date    AV FISTULA PLACEMENT Left 10/27/2022    Procedure: CREATION, AV FISTULA;  Surgeon: Tirso Tavera MD;  Location: [facility] OR [address] [place] [address];  Service: Peripheral Vascular;  Laterality: Left;  Brachio basilic 1st stage     BACK SURGERY  2017    CHOLECYSTECTOMY  2015    [facility]    COLONOSCOPY  11/13/2015    Dr Chino torres prep. internal hemorrhoids. diverticulosis of sigmois. NO polyps. repeat due 2025    FISTULOGRAM N/A 12/16/2022    Procedure: Fistulogram;  Surgeon: Tirso Tavera MD;  Location: [facility] CATH LAB;  Service: Peripheral Vascular;  Laterality: N/A;    FISTULOGRAM, WITH PTA Left 3/23/2023    Procedure: FISTULOGRAM, WITH PTA;  Surgeon: Tirso Tavera MD;  Location: [facility] OR [facility] FLR;  Service: Peripheral Vascular;  Laterality: Left;  LUE TRANSRADIAL CO2 FISTULOGRAM    1.9 min  22.99 mGy  4.3032 Gy.cm  200cc CO2  9ml TransRadial Solution    FISTULOGRAM, WITH PTA Left  8/15/2023    Procedure: FISTULOGRAM, WITH PTA TRANSRADIAL;  Surgeon: Tirso Tavera MD;  [facility];  Service: Peripheral Vascular;  Laterality: Left;  Fluoro: 5.1 min, mGy: 7.95, Gycm2: 1.2626, contrast: 12ml    FIXATION KYPHOPLASTY Bilateral 08/14/2019    Procedure: L2 kyphoplasty Fluoro Globus;  Surgeon: Jeremie Kwon DO;  Location: [facility] OR;  Service: Neurosurgery;  Laterality: Bilateral;  GLOBUS MACRINA WINSLOW [phone] TEXTED HER @ 10:54AM ON 7-30-19  PRE-OP-BY RN  8-7-2019    HYSTERECTOMY      OOPHORECTOMY  1996    one    PERCUTANEOUS TRANSLUMINAL ANGIOPLASTY OF ARTERIOVENOUS FISTULA Left 12/16/2022    Procedure: PTA, AV FISTULA;  Surgeon: Tirso Tavera MD;  Location: [facility] CATH LAB;  Service: Peripheral Vascular;  Laterality: Left;    RIGHT HEART CATHETERIZATION Right 6/15/2023    Procedure: INSERTION, CATHETER, RIGHT HEART;  Surgeon: Lucretia Rivas MD;  Location: [facility] CATH LAB;  Service: Cardiology;  Laterality: Right;    THYROIDECTOMY  2016    at [facility] for thyroid nodule    TONSILLECTOMY      TRANSPOSITION OF BASILIC VEIN Left 1/31/2023    Procedure: TRANSPOSITION, VEIN, BASILIC;  Surgeon: Tirso Tavera MD;  [address]  Service: Peripheral Vascular;  Laterality: Left;       Home Meds:   Prior to Admission medications    Medication Sig Start Date End Date Taking? Authorizing Provider   calcium acetate,phosphat bind, (PHOSLO) 667 mg capsule Take 1 capsule (667 mg total) by mouth 3 (three) times daily with meals. 6/10/23 6/9/24 Yes Fred Kelly MD   carvediloL (COREG) 25 MG tablet Take 1 tablet (25 mg total) by mouth 2 (two) times daily with meals. 2/10/23  Yes Kisha Crystal MD   cholecalciferol, vitamin D3, 125 mcg (5,000 unit) Tab Take 1 tablet (5,000 Units total) by mouth once daily. 1/4/23  Yes Elan Price MD   EScitalopram oxalate (LEXAPRO) 10 MG tablet Take 1 tablet (10 mg total) by mouth once daily.  Patient taking differently: Take 10 mg by mouth every  evening. 1/4/23  Yes Elan Price MD   ferrous gluconate (FERGON) 324 MG tablet Take 1 tablet (324 mg total) by mouth daily with breakfast. 5/28/23 11/24/23 Yes Victorino Luna MD   furosemide (LASIX) 40 MG tablet Take 2 tablets (80 mg total) by mouth 2 (two) times a day. 2/10/23  Yes Kisha Crystal MD   levocetirizine (XYZAL) 5 MG tablet Take 1 tablet (5 mg total) by mouth daily as needed for Allergies. 1/4/23  Yes Elan Price MD   NIFEdipine (PROCARDIA-XL) 30 MG (OSM) 24 hr tablet Take 1 tablet (30 mg total) by mouth 2 (two) times a day. 7/26/23 10/24/23 Yes Stepan Nuñez PA-C   ondansetron (ZOFRAN-ODT) 4 MG TbDL Take 1 tablet (4 mg total) by mouth every 12 (twelve) hours as needed (nausea). 8/10/23  Yes Fred Kelly MD   SYNTHROID 200 mcg tablet Take 1 tablet (200 mcg total) by mouth before breakfast. 1/4/23  Yes Elan Price MD   traZODone (DESYREL) 50 MG tablet Take 1 to 2 tablets ( mg total) by mouth nightly as needed for Insomnia. 1/4/23  Yes Elan Price MD   OPW TEST CLAIM - DO NOT FILL OPW test claim. Do not fill. 7/26/23        Anticoagulants/Antiplatelets: no anticoagulation    Allergies:   Review of patient's allergies indicates:   Allergen Reactions    Atorvastatin Other (See Comments)     Statin induced myalgia    Mushroom Hives    Peanut Anaphylaxis    Bactrim [sulfamethoxazole-trimethoprim] Hives    Gabapentin      Pruritis     Iodine     Peanut butter flavor     Penicillins Hives    Shellfish containing products Itching    Sulfa (sulfonamide antibiotics) Hives     Sedation History:  no adverse reactions    Review of Systems:   Hematological: no known coagulopathies  Respiratory: no shortness of breath  Cardiovascular: no chest pain  Gastrointestinal: no abdominal pain  Genito-Urinary: no dysuria  Musculoskeletal: negative  Neurological: no TIA or stroke symptoms         OBJECTIVE:     Vital Signs (Most Recent)  Temp: 98.1 °F (36.7 °C) (08/17/23 1028)  Pulse: 76 (08/17/23  1028)  Resp: 18 (08/17/23 1028)  BP: (!) 175/81 (08/17/23 1028)  SpO2: 100 % (08/17/23 1028)    Physical Exam:  ASA: 3  Mallampati: 2    General: no acute distress  Mental Status: alert and oriented to person, place and time  HEENT: normocephalic, atraumatic  Chest: unlabored breathing  Heart: regular heart rate  Abdomen: nondistended  Extremity: moves all extremities    Laboratory  Lab Results   Component Value Date    INR 1.1 08/14/2023       Lab Results   Component Value Date    WBC 11.82 08/10/2023    HGB 10.9 (L) 08/10/2023    HCT 33.6 (L) 08/10/2023    MCV 87 08/10/2023     08/10/2023      Lab Results   Component Value Date     (H) 08/10/2023     08/10/2023    K 3.9 08/10/2023     08/10/2023    CO2 19 (L) 08/10/2023    BUN 50 (H) 08/10/2023    CREATININE 4.2 (H) 08/10/2023    CALCIUM 9.1 08/10/2023    MG 2.1 07/26/2023    ALT 9 (L) 07/26/2023    AST 14 07/26/2023    ALBUMIN 3.7 08/10/2023    BILITOT 0.3 07/26/2023    BILIDIR 0.1 03/27/2023       ASSESSMENT/PLAN:     Sedation Plan: Moderate.  Patient will undergo tunneled HD catheter placement.    Joce Valles MD  Diagnostic and Interventional Radiologist  Department of Radiology  Pager: [phone]

## 2023-08-17 NOTE — DISCHARGE SUMMARY
Radiology Discharge Summary      Hospital Course: No complications    Admit Date: 8/17/2023  Discharge Date: 08/17/2023     Instructions Given to Patient: Yes  Diet: Resume prior diet  Activity: activity as tolerated    Description of Condition on Discharge: Stable  Vital Signs (Most Recent): Temp: 98.1 °F (36.7 °C) (08/17/23 1028)  Pulse: 81 (08/17/23 1149)  Resp: 16 (08/17/23 1149)  BP: (!) 198/74 (08/17/23 1149)  SpO2: 96 % (08/17/23 1149)    Discharge Disposition: Home    Discharge Diagnosis: Renal failure s/p tunneled HD catheter placement     Follow-up: ANATOLIY Valles MD  Diagnostic and Interventional Radiologist  Department of Radiology  Pager: [phone]

## 2023-08-17 NOTE — PROCEDURES
Radiology Post-Procedure Note    Pre Op Diagnosis: Renal failure  Post Op Diagnosis: Same    Procedure: Tunneled HD catheter placement    Procedure performed by: Joce Valles MD    Written Informed Consent Obtained: Yes  Specimen Removed: NO  Estimated Blood Loss: Minimal    Findings:   Right chest wall tunneled HD line placed via IJ access.  23cm tip-to-cuff.  No complications.    Patient tolerated procedure well.    Joce Valles MD  Diagnostic and Interventional Radiologist  Department of Radiology  Pager: [phone]

## 2023-08-17 NOTE — PLAN OF CARE
Chart reviewed. Preop nursing care completed per orders. Safe surgery checklist complete. Pt denies any open wounds cuts or sores. Pt denies any metal in body. Family at bedside and belongings placed in PACU locker 4. Waiting for updated H&P and surgical consents prior to surgery. Pt AAOX3, VSS on room air. Pt toileted, Bed locked in lowest position, Call light within reach. Pt denies any needs at this time.

## 2023-08-18 DIAGNOSIS — T82.898D ARTERIOVENOUS FISTULA OCCLUSION, SUBSEQUENT ENCOUNTER: Primary | ICD-10-CM

## 2023-08-22 DIAGNOSIS — T82.898D ARTERIOVENOUS FISTULA OCCLUSION, SUBSEQUENT ENCOUNTER: Primary | ICD-10-CM

## 2023-08-23 ENCOUNTER — TELEPHONE (OUTPATIENT)
Dept: TRANSPLANT | Facility: CLINIC | Age: 63
End: 2023-08-23
Payer: MEDICARE

## 2023-08-23 DIAGNOSIS — Z76.82 ORGAN TRANSPLANT CANDIDATE: Primary | ICD-10-CM

## 2023-08-25 ENCOUNTER — TELEPHONE (OUTPATIENT)
Dept: TRANSPLANT | Facility: CLINIC | Age: 63
End: 2023-08-25
Payer: MEDICARE

## 2023-08-31 ENCOUNTER — OFFICE VISIT (OUTPATIENT)
Dept: TRANSPLANT | Facility: CLINIC | Age: 63
End: 2023-08-31
Payer: MEDICARE

## 2023-08-31 ENCOUNTER — HOSPITAL ENCOUNTER (OUTPATIENT)
Dept: RADIOLOGY | Facility: HOSPITAL | Age: 63
Discharge: HOME | End: 2023-08-31
Attending: NURSE PRACTITIONER
Payer: MEDICARE

## 2023-08-31 VITALS
BODY MASS INDEX: 35.85 KG/M2 | HEART RATE: 89 BPM | WEIGHT: 236.56 LBS | TEMPERATURE: 97 F | RESPIRATION RATE: 18 BRPM | DIASTOLIC BLOOD PRESSURE: 87 MMHG | HEIGHT: 68 IN | SYSTOLIC BLOOD PRESSURE: 139 MMHG | OXYGEN SATURATION: 97 %

## 2023-08-31 DIAGNOSIS — Z76.82 ORGAN TRANSPLANT CANDIDATE: ICD-10-CM

## 2023-08-31 DIAGNOSIS — Z01.818 PRE-TRANSPLANT EVALUATION FOR KIDNEY TRANSPLANT: Primary | ICD-10-CM

## 2023-08-31 DIAGNOSIS — E11.21 DIABETIC NEPHROPATHY ASSOCIATED WITH TYPE 2 DIABETES MELLITUS: ICD-10-CM

## 2023-08-31 DIAGNOSIS — Z71.85 IMMUNIZATION COUNSELING: ICD-10-CM

## 2023-08-31 DIAGNOSIS — Z86.19 HISTORY OF HEPATITIS C: ICD-10-CM

## 2023-08-31 DIAGNOSIS — D68.1 FACTOR XI DEFICIENCY: Chronic | ICD-10-CM

## 2023-08-31 DIAGNOSIS — Z99.2 ESRD ON HEMODIALYSIS: ICD-10-CM

## 2023-08-31 DIAGNOSIS — N18.6 ESRD ON HEMODIALYSIS: ICD-10-CM

## 2023-08-31 PROCEDURE — 99203 OFFICE O/P NEW LOW 30 MIN: CPT | Mod: S$GLB,TXP,, | Performed by: STUDENT IN AN ORGANIZED HEALTH CARE EDUCATION/TRAINING PROGRAM

## 2023-08-31 PROCEDURE — 99205 OFFICE O/P NEW HI 60 MIN: CPT | Mod: S$GLB,TXP,, | Performed by: NURSE PRACTITIONER

## 2023-08-31 PROCEDURE — 3044F HG A1C LEVEL LT 7.0%: CPT | Mod: CPTII,S$GLB,TXP, | Performed by: NURSE PRACTITIONER

## 2023-08-31 PROCEDURE — 99499 UNLISTED E&M SERVICE: CPT | Mod: HCNC,S$GLB,TXP, | Performed by: NURSE PRACTITIONER

## 2023-08-31 PROCEDURE — 72170 X-RAY EXAM OF PELVIS: CPT | Mod: TC,HCNC,TXP

## 2023-08-31 PROCEDURE — 3079F DIAST BP 80-89 MM HG: CPT | Mod: CPTII,S$GLB,TXP, | Performed by: NURSE PRACTITIONER

## 2023-08-31 PROCEDURE — 71046 X-RAY EXAM CHEST 2 VIEWS: CPT | Mod: TC,HCNC,TXP

## 2023-08-31 PROCEDURE — 1160F RVW MEDS BY RX/DR IN RCRD: CPT | Mod: CPTII,S$GLB,TXP, | Performed by: NURSE PRACTITIONER

## 2023-08-31 PROCEDURE — 3075F PR MOST RECENT SYSTOLIC BLOOD PRESS GE 130-139MM HG: ICD-10-PCS | Mod: CPTII,S$GLB,TXP, | Performed by: NURSE PRACTITIONER

## 2023-08-31 PROCEDURE — 99203 PR OFFICE/OUTPT VISIT, NEW, LEVL III, 30-44 MIN: ICD-10-PCS | Mod: S$GLB,TXP,, | Performed by: STUDENT IN AN ORGANIZED HEALTH CARE EDUCATION/TRAINING PROGRAM

## 2023-08-31 PROCEDURE — 99999 PR PBB SHADOW E&M-EST. PATIENT-LVL V: ICD-10-PCS | Mod: PBBFAC,HCNC,TXP, | Performed by: NURSE PRACTITIONER

## 2023-08-31 PROCEDURE — 3066F NEPHROPATHY DOC TX: CPT | Mod: CPTII,S$GLB,TXP, | Performed by: NURSE PRACTITIONER

## 2023-08-31 PROCEDURE — 99205 PR OFFICE/OUTPT VISIT, NEW, LEVL V, 60-74 MIN: ICD-10-PCS | Mod: S$GLB,TXP,, | Performed by: NURSE PRACTITIONER

## 2023-08-31 PROCEDURE — 3008F BODY MASS INDEX DOCD: CPT | Mod: CPTII,S$GLB,TXP, | Performed by: NURSE PRACTITIONER

## 2023-08-31 PROCEDURE — 99999 PR PBB SHADOW E&M-EST. PATIENT-LVL V: CPT | Mod: PBBFAC,HCNC,TXP, | Performed by: NURSE PRACTITIONER

## 2023-08-31 PROCEDURE — 1159F PR MEDICATION LIST DOCUMENTED IN MEDICAL RECORD: ICD-10-PCS | Mod: CPTII,S$GLB,TXP, | Performed by: NURSE PRACTITIONER

## 2023-08-31 PROCEDURE — 71046 X-RAY EXAM CHEST 2 VIEWS: CPT | Mod: 26,TXP,, | Performed by: RADIOLOGY

## 2023-08-31 PROCEDURE — 1160F PR REVIEW ALL MEDS BY PRESCRIBER/CLIN PHARMACIST DOCUMENTED: ICD-10-PCS | Mod: CPTII,S$GLB,TXP, | Performed by: NURSE PRACTITIONER

## 2023-08-31 PROCEDURE — 71046 XR CHEST PA AND LATERAL: ICD-10-PCS | Mod: 26,TXP,, | Performed by: RADIOLOGY

## 2023-08-31 PROCEDURE — 3066F PR DOCUMENTATION OF TREATMENT FOR NEPHROPATHY: ICD-10-PCS | Mod: CPTII,S$GLB,TXP, | Performed by: NURSE PRACTITIONER

## 2023-08-31 PROCEDURE — 3079F PR MOST RECENT DIASTOLIC BLOOD PRESSURE 80-89 MM HG: ICD-10-PCS | Mod: CPTII,S$GLB,TXP, | Performed by: NURSE PRACTITIONER

## 2023-08-31 PROCEDURE — 99205 PR OFFICE/OUTPT VISIT, NEW, LEVL V, 60-74 MIN: ICD-10-PCS | Mod: S$GLB,TXP,, | Performed by: TRANSPLANT SURGERY

## 2023-08-31 PROCEDURE — 72170 X-RAY EXAM OF PELVIS: CPT | Mod: 26,TXP,, | Performed by: RADIOLOGY

## 2023-08-31 PROCEDURE — 99499 RISK ADDL DX/OHS AUDIT: ICD-10-PCS | Mod: HCNC,S$GLB,TXP, | Performed by: NURSE PRACTITIONER

## 2023-08-31 PROCEDURE — 3075F SYST BP GE 130 - 139MM HG: CPT | Mod: CPTII,S$GLB,TXP, | Performed by: NURSE PRACTITIONER

## 2023-08-31 PROCEDURE — 3008F PR BODY MASS INDEX (BMI) DOCUMENTED: ICD-10-PCS | Mod: CPTII,S$GLB,TXP, | Performed by: NURSE PRACTITIONER

## 2023-08-31 PROCEDURE — 3044F PR MOST RECENT HEMOGLOBIN A1C LEVEL <7.0%: ICD-10-PCS | Mod: CPTII,S$GLB,TXP, | Performed by: NURSE PRACTITIONER

## 2023-08-31 PROCEDURE — 99205 OFFICE O/P NEW HI 60 MIN: CPT | Mod: S$GLB,TXP,, | Performed by: TRANSPLANT SURGERY

## 2023-08-31 PROCEDURE — 72170 XR PELVIS ROUTINE AP: ICD-10-PCS | Mod: 26,TXP,, | Performed by: RADIOLOGY

## 2023-08-31 PROCEDURE — 1159F MED LIST DOCD IN RCRD: CPT | Mod: CPTII,S$GLB,TXP, | Performed by: NURSE PRACTITIONER

## 2023-08-31 NOTE — PROGRESS NOTES
Transplant Nephrology  Kidney Transplant Recipient Evaluation    Referring Physician: Fred Kelly  Current Nephrologist: Fred Kelly    Subjective:   CC:  Initial evaluation of kidney transplant candidacy.    HPI:  Ms. Quezada is a 63 y.o. year old Black or  female who has presented to be evaluated as a potential kidney transplant recipient.  She has ESRD secondary to presumed diabetic nephropathy.  Declining renal function in the last 2-3 years. She has been having HTN for 44 years and DM since 2005. She had a CVA 2017 without residual affects. Patient is currently on hemodialysis started on 2 weeks ago. Patient is dialyzing on MWF schedule.  Patient reports that she is tolerating dialysis well.. She has a LUE AV fistula and chest catheter for dialysis access.     Previously declined for transplant 5/5/2023: Patient not a candidate at this time due to pulmonary hypertension and pericardial effusion noted on echocardiogram. Can be re-referred once issues have been resolved. It is noted she is predialysis, and team discussed dialysis may benefit fluid management.    Dr. Pinzon HTS 6/15/2023: Patient RHC done today was reviewed; Mildly increased right and left sided filling pressures.Mild pulmonary hypertension. RA: 12/ 11/ 10 RV: 40/ 7/ 11 PA: 40/ 18/ 25 PWP: 22/ 21/ 17. Cardiac output was 5.27  by Ramya. Cardiac index is 2.39 L/min/m2.. O2 Sat: PA 65%. TPG  8  \\PVR  1.51. SVR 1594. Normal CO/CI. Systolic blood pressures 170-180 throughout procedure. From  a cardiac standpoint, his pulmonary HTN is mild and does not preclude Kidney transplant. Recommend afterload reduction (ie. BP control).     History HCV s/p harvoni with SVR 2015. LOV HCV clinic 1/2023: Pt is clear for kidney transplant. Does not require combined liver-kidney transplant. From liver standpoint, can receive HCV (+) donor.    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 LOV hematology 1/2023: Recommend checking PTT  and Factor XII level prior to any planned procedures and routinely in the post-operative setting. Recommend 2u FFP prior to any major operations such as renal transplant,  and 1-2u FFP POD #1 from major operations to prevent bleeding complications. Given history of heart failure, be cautious of volume overload with product support.  Consider inpatient hematology consult post-operatively as needed for any bleeding complications.     She was previously having diarrhea daily, she has seen GI and this has now completely resolved and she is having normal BMs.    Functionally she is doing amazing, she feels like a new person since starting dialysis. Peripheral edema and SOB are resolved, she is much more active. Uses not assistive devices, good upper and lower body strength. Looks great, not frail.    Has several children and grandchildren that are interested in donation.    Denies MI, DVT/PE, or malignancy history.     Current Outpatient Medications   Medication Sig Dispense Refill    calcium acetate,phosphat bind, (PHOSLO) 667 mg capsule Take 1 capsule (667 mg total) by mouth 3 (three) times daily with meals. 90 capsule 2    carvediloL (COREG) 25 MG tablet Take 1 tablet (25 mg total) by mouth 2 (two) times daily with meals. 180 tablet 3    cholecalciferol, vitamin D3, 125 mcg (5,000 unit) Tab Take 1 tablet (5,000 Units total) by mouth once daily. 90 tablet 3    EScitalopram oxalate (LEXAPRO) 10 MG tablet Take 1 tablet (10 mg total) by mouth once daily. (Patient taking differently: Take 10 mg by mouth every evening.) 90 tablet 3    ferrous gluconate (FERGON) 324 MG tablet Take 1 tablet (324 mg total) by mouth daily with breakfast. 90 tablet 1    furosemide (LASIX) 40 MG tablet Take 2 tablets (80 mg total) by mouth 2 (two) times a day. 120 tablet 3    NIFEdipine (PROCARDIA-XL) 30 MG (OSM) 24 hr tablet Take 1 tablet (30 mg total) by mouth 2 (two) times a day. 60 tablet 2    ondansetron (ZOFRAN-ODT) 4 MG TbDL Take 1 tablet  (4 mg total) by mouth every 12 (twelve) hours as needed (nausea). 30 tablet 0    SYNTHROID 200 mcg tablet Take 1 tablet (200 mcg total) by mouth before breakfast. 90 tablet 3    traZODone (DESYREL) 50 MG tablet Take 1 to 2 tablets ( mg total) by mouth nightly as needed for Insomnia. 120 tablet 3    levocetirizine (XYZAL) 5 MG tablet Take 1 tablet (5 mg total) by mouth daily as needed for Allergies. (Patient not taking: Reported on 8/31/2023) 90 tablet 3    OPW TEST CLAIM - DO NOT FILL OPW test claim. Do not fill. 1 tablet 0     No current facility-administered medications for this visit.       Past Medical History:   Diagnosis Date    Acute respiratory failure with hypoxia and hypercapnia 05/26/2019    ATN (acute tubular necrosis) 02/08/2019    Chronic diastolic heart failure 02/07/2019 2-8-19 Mild left atrial enlargement. Mild left ventricular enlargement. Normal left ventricular systolic function. The estimated ejection fraction is 55% Indeterminate left ventricular diastolic function. Normal right ventricular systolic function. Mild mitral regurgitation. Mild to moderate tricuspid regurgitation. The estimated PA systolic pressure is 32 mm Hg Normal central venous pressure (3 m    CKD (chronic kidney disease) stage 4, GFR 15-29 ml/min 05/16/2018    CKD stage G3a/A3, GFR 45-59 and albumin creatinine ratio >300 mg/g 05/16/2018    CKD stage G3b/A3, GFR 30-44 and albumin creatinine ratio >300 mg/g 05/16/2018    Closed compression fracture of L2 lumbar vertebra 05/24/2019    Closed compression fracture of L2 lumbar vertebra 05/24/2019     IMO Regulatory Load October 2019    Controlled type 2 diabetes with retinopathy 04/09/2018    COVID-19 virus infection 7-1-2021 07/01/2021    CVA (cerebral vascular accident) 2017    Diabetic polyneuropathy associated with type 2 diabetes mellitus 12/04/2020    Encounter for blood transfusion     Essential hypertension 04/09/2018    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  7-31-19 Factor XI Activity 55 - 145 % 96       Gastroesophageal reflux disease without esophagitis 05/24/2019    Generalized anxiety disorder 11/08/2019    GERD (gastroesophageal reflux disease)     GIB (gastrointestinal bleeding) 10/18/2018    History of CVA (cerebrovascular accident) without residual deficits 11/27/2022    History of hepatitis C     s/p succesful Rx w/ Harvoni  - SVR12 (cure) 2016    Hypertensive retinopathy, bilateral 12/10/2019    Mild nonproliferative diabetic retinopathy 05/02/2018    Mixed hyperlipidemia 04/09/2018    Myalgia due to statin 09/07/2022    Normocytic anemia 01/14/2022    NS (nuclear sclerosis), bilateral 12/10/2019    Peanut allergy 02/04/2023    PNA (pneumonia) 05/28/2019    Postoperative hypothyroidism     Renovascular hypertension 04/09/2018    S/P kyphoplasty 08/14/2019 8/14/19: L2 kyphoplasty with Dr. Kwon     Slow transit constipation 05/27/2019    Type 2 diabetes mellitus with circulatory disorder, with long-term current use of insulin 04/09/2018    Type 2 diabetes mellitus with stage 3 chronic kidney disease, without long-term current use of insulin 04/09/2018    Type 2 diabetes mellitus with stage 3a chronic kidney disease, without long-term current use of insulin 03/03/2021    Type 2 diabetes mellitus with stage 3b chronic kidney disease, without long-term current use of insulin 09/07/2022       Past Medical and Surgical History: Ms. Quezada  has a past medical history of Acute respiratory failure with hypoxia and hypercapnia, ATN (acute tubular necrosis), Chronic diastolic heart failure, CKD (chronic kidney disease) stage 4, GFR 15-29 ml/min, CKD stage G3a/A3, GFR 45-59 and albumin creatinine ratio >300 mg/g, CKD stage G3b/A3, GFR 30-44 and albumin creatinine ratio >300 mg/g, Closed compression fracture of L2 lumbar vertebra, Closed compression fracture of L2 lumbar vertebra, Controlled type 2 diabetes with retinopathy, COVID-19 virus infection  7-1-2021, CVA (cerebral vascular accident), Diabetic polyneuropathy associated with type 2 diabetes mellitus, Encounter for blood transfusion, Essential hypertension, Factor XI deficiency, Gastroesophageal reflux disease without esophagitis, Generalized anxiety disorder, GERD (gastroesophageal reflux disease), GIB (gastrointestinal bleeding), History of CVA (cerebrovascular accident) without residual deficits, History of hepatitis C, Hypertensive retinopathy, bilateral, Mild nonproliferative diabetic retinopathy, Mixed hyperlipidemia, Myalgia due to statin, Normocytic anemia, NS (nuclear sclerosis), bilateral, Peanut allergy, PNA (pneumonia), Postoperative hypothyroidism, Renovascular hypertension, S/P kyphoplasty, Slow transit constipation, Type 2 diabetes mellitus with circulatory disorder, with long-term current use of insulin, Type 2 diabetes mellitus with stage 3 chronic kidney disease, without long-term current use of insulin, Type 2 diabetes mellitus with stage 3a chronic kidney disease, without long-term current use of insulin, and Type 2 diabetes mellitus with stage 3b chronic kidney disease, without long-term current use of insulin.  She has a past surgical history that includes Hysterectomy; Back surgery (2017); Tonsillectomy; Colonoscopy (11/13/2015); Oophorectomy (1996); Thyroidectomy (2016); Cholecystectomy (2015); Fixation Kyphoplasty (Bilateral, 08/14/2019); AV fistula placement (Left, 10/27/2022); Percutaneous transluminal angioplasty of arteriovenous fistula (Left, 12/16/2022); Fistulogram (N/A, 12/16/2022); Transposition of basilic vein (Left, 1/31/2023); fistulogram, with pta (Left, 3/23/2023); Right heart catheterization (Right, 6/15/2023); and fistulogram, with pta (Left, 8/15/2023).    Past Social and Family History: Ms. Quezada reports that she has never smoked. She has been exposed to tobacco smoke. She has never used smokeless tobacco. She reports that she does not currently use alcohol.  "She reports that she does not use drugs. Her family history includes Aneurysm in her brother; Coronary artery disease in her mother; Diabetes type II in her mother; Heart disease in her mother; Hypertension in her father and mother; Prostate cancer in her father; Stroke in her mother.    Review of Systems   Constitutional:  Positive for fatigue. Negative for activity change, appetite change and fever.   HENT:  Negative for congestion, mouth sores and sore throat.    Eyes:  Positive for visual disturbance.        Wears glasses   Respiratory:  Negative for cough, chest tightness and shortness of breath.    Cardiovascular:  Negative for chest pain, palpitations and leg swelling.   Gastrointestinal:  Negative for abdominal distention, constipation, diarrhea and nausea.   Genitourinary:  Negative for difficulty urinating, frequency and hematuria.   Musculoskeletal:  Negative for arthralgias, gait problem and myalgias.   Skin:  Negative for wound.   Allergic/Immunologic: Negative for environmental allergies, food allergies and immunocompromised state.   Neurological:  Negative for dizziness, weakness and numbness.   Hematological:  Bruises/bleeds easily.   Psychiatric/Behavioral:  Negative for sleep disturbance. The patient is not nervous/anxious.        Objective:   Blood pressure 139/87, pulse 89, temperature 97.2 °F (36.2 °C), temperature source Temporal, resp. rate 18, height 5' 7.95" (1.726 m), weight 107.3 kg (236 lb 8.9 oz), SpO2 97 %.body mass index is 36.02 kg/m².    Physical Exam  Vitals and nursing note reviewed.   Constitutional:       Appearance: Normal appearance.   HENT:      Head: Normocephalic.   Cardiovascular:      Rate and Rhythm: Normal rate and regular rhythm.      Heart sounds: Normal heart sounds.      Comments: Dialysis catheter, no erythema, edema, tenderness or drainage.   Pulmonary:      Effort: Pulmonary effort is normal.      Breath sounds: Normal breath sounds.   Abdominal:      General: " Bowel sounds are normal. There is no distension.      Palpations: Abdomen is soft.      Tenderness: There is no abdominal tenderness.   Musculoskeletal:         General: Normal range of motion.   Skin:     General: Skin is warm and dry.      Comments: LUE AV fistula + thrill    Neurological:      General: No focal deficit present.      Mental Status: She is alert.   Psychiatric:         Behavior: Behavior normal.         Labs:  Lab Results   Component Value Date    WBC 11.82 08/10/2023    HGB 10.9 (L) 08/10/2023    HCT 33.6 (L) 08/10/2023     08/10/2023    K 3.9 08/10/2023     08/10/2023    CO2 19 (L) 08/10/2023    BUN 50 (H) 08/10/2023    CREATININE 4.2 (H) 08/10/2023    EGFRNORACEVR 11.5 (A) 08/10/2023    CALCIUM 9.1 08/10/2023    PHOS 4.4 08/10/2023    MG 2.1 07/26/2023    ALBUMIN 3.7 08/10/2023    AST 14 07/26/2023    ALT 9 (L) 07/26/2023    UTPCR 0.60 (H) 03/27/2023    .7 (H) 08/10/2023       Lab Results   Component Value Date    BILIRUBINUA Negative 02/01/2023    LIPASE 46 07/30/2022    PROTEINUA 2+ (A) 02/01/2023    NITRITE Negative 02/01/2023    RBCUA 1 02/01/2023    WBCUA 22 (H) 02/01/2023       Labs were reviewed with the patient.    Assessment:     1. Pre-transplant evaluation for kidney transplant    2. ESRD on hemodialysis    3. Diabetic nephropathy associated with type 2 diabetes mellitus    4. Immunization counseling    5. Factor XI deficiency    6. History of hepatitis C    7. BMI 36.0-36.9,adult      Plan:   63 y.o. year old  female who has presented to be evaluated as a potential kidney transplant recipient.  She has ESRD secondary to presumed diabetic nephropathy.  Declining renal function in the last 2-3 years. She has been having HTN for 44 years and DM since 2005. She had a CVA 2017 without residual affects. Patient is currently on hemodialysis started 2 weeks ago. Previously declined due to pulmonary HTN, she had RHC and clearance from HTS. She has also been  given hepatology (history HCV) and hematology (factor XI deficiency) blessing for transplant. Functional status has drastically improved since starting dialysis. She will be ready for presentation following CXR, PXR, and updated MMG. She has several potential living donors.        Transplant Candidacy:   Based on available information, Ms. Quezada is a suitable kidney transplant candidate.   Meets center eligibility for accepting HCV+ donor offer - Yes.  Patient educated on HCV+ donors. Kathie is willing to accept HCV+ donor offer - Yes   Patient is a candidate for KDPI > 85 kidney donor offer - No (weight > 88 kg).  Final determination of transplant candidacy will be made once workup is complete and reviewed by the selection committee.    Patient advised that it is recommended that all transplant candidates, and their close contacts and household members receive Covid vaccination.    UNOS Patient Status  Functional Status: 80% - Normal activity with effort: some symptoms of disease    Mervat Rashid NP

## 2023-08-31 NOTE — PROGRESS NOTES
Transplant Surgery  Kidney Transplant Recipient Evaluation    Referring Physician: Fred Kelly  Current Nephrologist: Fred Kelly    Subjective:     Reason for Visit: evaluate transplant candidacy    History of Present Illness: Kathie Quezada is a 63 y.o. year old female undergoing transplant evaluation.    Dialysis History: Kathie is on hemodialysis.      Transplant History: N/A    Etiology of Renal Disease: Diabetes Mellitus - Type II (based on medical records from referral).    External provider notes reviewed: Yes    Review of Systems   Constitutional:  Negative for activity change, appetite change, chills, fatigue and fever.   HENT:  Negative for sore throat and trouble swallowing.    Eyes:  Negative for visual disturbance.   Respiratory:  Negative for cough, chest tightness and shortness of breath.    Cardiovascular:  Negative for chest pain, palpitations and leg swelling.   Gastrointestinal:  Negative for abdominal distention, abdominal pain, blood in stool, constipation, diarrhea, nausea and vomiting.   Endocrine: Negative for polyuria.   Genitourinary:  Negative for decreased urine volume, difficulty urinating, dysuria, flank pain, frequency and hematuria.   Musculoskeletal:  Negative for gait problem, myalgias and neck stiffness.   Skin:  Negative for rash and wound.   Neurological:  Negative for dizziness, tremors, seizures, weakness, light-headedness and headaches.   Hematological:  Negative for adenopathy.   Psychiatric/Behavioral:  Negative for agitation, confusion and sleep disturbance.      Objective:     Physical Exam:  Constitutional:   Vitals reviewed: yes   Well-nourished and well-groomed: yes  Eyes:   Sclerae icteric: no   Extraocular movements intact: yes  GI:    Bowel sounds normal: yes   Tenderness: no    If yes, quadrant/location: not applicable   Palpable masses: no    If yes, quadrant/location: not applicable   Hepatosplenomegaly: no   Ascites: no   Hernia: no    If yes,  type/location: not applicable   Surgical scars: yes    If yes, type/location: infra umbilical midline  Resp:   Effort normal: yes   Breath sounds normal: yes    CV:   Regular rate and rhythm: yes   Heart sounds normal: yes   Femoral pulses normal: yes   Extremities edematous: no  Skin:   Rashes or lesions present: no    If yes, describe:not applicable   Jaundice:: no    Musculoskeletal:   Gait normal: yes   Strength normal: yes  Psych:   Oriented to person, place, and time: yes   Affect and mood normal: yes    Additional comments: not applicable    Diagnostics:  The following labs have been reviewed: CBC  CMP  PT  INR  PTT  Pth  Lipid panel  abo  The following radiology images have been independently reviewed and interpreted: CT Chest    Counseling: We provided Kathie Quezada with a group education session today.  We discussed kidney transplantation at length with her, including risks, potential complications, and alternatives in the management of her renal failure.  The discussion included complications related to anesthesia, bleeding, infection, primary nonfunction, and ATN.  I discussed the typical postoperative course, length of hospitalization, the need for long-term immunosuppression, and the need for long-term routine follow-up.  I discussed living-donor and -donor transplantation and the relative advantages and disadvantages of each.  I also discussed average waiting times for both living donation and  donation.  I discussed national and center-specific survival rates.  I also mentioned the potential benefit of multicenter listing to candidates listed with centers within more than one organ procurement organization.  All questions were answered.    Patient advised that it is recommended that all transplant candidates, and their close contacts and household members receive Covid vaccination.    Final determination of transplant candidacy will be made once evaluation is complete and  reviewed by the Kidney & Kidney/Pancreas Selection Committee.    Coronavirus disease (COVID-19) caused by severe acute respiratory virus coronavirus 2 (SARS-C0V 2) is associated with increased mortality in solid organ transplant recipients (SOT) compared to non-transplant patients. Vaccine responses to vaccination are depressed against SARS-CoV2 compared to normal individuals but improve with third vaccination doses. Vaccination prior to SOT provides both the best opportunity for transplant candidates to develop protective immunity and to reduce the risk of serious COVID19 infections post transplantation. Organ transplant candidates at [facility] will be required to receive SARS-CoV-2 vaccination prior to being listed with a an active status, whenever possible. Exceptions will be made for disability related reasons or for sincerely held [employer] beliefs.          Transplant Surgery - Candidacy   Assessment/Plan:   Kathie Quezada has end stage renal disease (ESRD) on dialysis. I see no surgical contraindication to placing a kidney transplant. Based on available information, Kathie Quezada is a suitable kidney transplant candidate.     Additional testing to be completed according to the Written Order Guidelines for Adult Pre-kidney and Pancreas Transplant Evaluation (KI-02).  Interpretation of tests and discussion of patient management involves all members of the multidisciplinary transplant team.    Vicki Mendoza MD

## 2023-08-31 NOTE — PROGRESS NOTES
PRE-TRANSPLANT INFECTIOUS DISEASE CONSULT    Reason for Visit:  Pre-transplant evaluation  Referring Provider: Dr. Fred Kelly     History of Present Illness:    63 y.o. female with a history of HCV (2016 tx'd w/ SVR), DM2, HTN and ESRD on HD -- presents for pre-kidney transplant evaluation.    Infectious History:   Recent hospital admissions: Yes, for fistula LUE malfunction  Recent infections: No   Recent or current antibiotic use: No  History of recurrent infections *(sinus / pneumonia / UTI / SBP)*: No  History of diabetic foot wound or bone/joint infection: No  Recent dental infections, issues or procedures: No  History of chicken pox: No  History of shingles: No  History of STI: No  History of COVID infection: Yes    History of Immunosuppression:  Prior chemotherapy / immunosuppression: No  Prior transplant: No  History of splenectomy: No    Tuberculosis:  Prior screening for latent TB: Yes  Prior diagnosis of latent TB: No  Risk factors for TB *known exposure, incarceration, homelessness*: No    Geographical exposures:  Currently lives in [place] with self.  Lived in the following states: [place], [place]  Lived or travelled to the [facility] [place]: No  International travel: Yes, [place], [place], [place], [place].  Travel-associated illness: No    Social/Environmental:  Occupation:  retired; previously, ,   Pets: No   Livestock: No  Fishing / hunting: No  Hobbies: relaxing, home life, crafting, reading  Water: [place]  Consumption of raw/undercooked meat or seafood?  No  Tobacco: No  Alcohol: No  Recreational drug use:  No  Sexual partners: none in last 12mo; historically male partners only.      Past Histories:   Past Medical History:   Diagnosis Date    Acute respiratory failure with hypoxia and hypercapnia 05/26/2019    ATN (acute tubular necrosis) 02/08/2019    Chronic diastolic heart failure 02/07/2019    2-8-19 Mild left atrial enlargement. Mild left ventricular enlargement.  Normal left ventricular systolic function. The estimated ejection fraction is 55% Indeterminate left ventricular diastolic function. Normal right ventricular systolic function. Mild mitral regurgitation. Mild to moderate tricuspid regurgitation. The estimated PA systolic pressure is 32 mm Hg Normal central venous pressure (3 m    CKD (chronic kidney disease) stage 4, GFR 15-29 ml/min 05/16/2018    CKD stage G3a/A3, GFR 45-59 and albumin creatinine ratio >300 mg/g 05/16/2018    CKD stage G3b/A3, GFR 30-44 and albumin creatinine ratio >300 mg/g 05/16/2018    Closed compression fracture of L2 lumbar vertebra 05/24/2019    Closed compression fracture of L2 lumbar vertebra 05/24/2019     IMO Regulatory Load October 2019    Controlled type 2 diabetes with retinopathy 04/09/2018    COVID-19 virus infection 7-1-2021 07/01/2021    CVA (cerebral vascular accident) 2017    Diabetic polyneuropathy associated with type 2 diabetes mellitus 12/04/2020    Encounter for blood transfusion     Essential hypertension 04/09/2018    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  7-31-19 Factor XI Activity 55 - 145 % 96       Gastroesophageal reflux disease without esophagitis 05/24/2019    Generalized anxiety disorder 11/08/2019    GERD (gastroesophageal reflux disease)     GIB (gastrointestinal bleeding) 10/18/2018    History of CVA (cerebrovascular accident) without residual deficits 11/27/2022    History of hepatitis C     s/p succesful Rx w/ Porsha  - SVR12 (cure) 2016    Hypertensive retinopathy, bilateral 12/10/2019    Mild nonproliferative diabetic retinopathy 05/02/2018    Mixed hyperlipidemia 04/09/2018    Myalgia due to statin 09/07/2022    Normocytic anemia 01/14/2022    NS (nuclear sclerosis), bilateral 12/10/2019    Peanut allergy 02/04/2023    PNA (pneumonia) 05/28/2019    Postoperative hypothyroidism     Renovascular hypertension 04/09/2018    S/P kyphoplasty 08/14/2019 8/14/19: L2 kyphoplasty with   Doyle     Slow transit constipation 05/27/2019    Type 2 diabetes mellitus with circulatory disorder, with long-term current use of insulin 04/09/2018    Type 2 diabetes mellitus with stage 3 chronic kidney disease, without long-term current use of insulin 04/09/2018    Type 2 diabetes mellitus with stage 3a chronic kidney disease, without long-term current use of insulin 03/03/2021    Type 2 diabetes mellitus with stage 3b chronic kidney disease, without long-term current use of insulin 09/07/2022     Past Surgical History:   Procedure Laterality Date    AV FISTULA PLACEMENT Left 10/27/2022    Procedure: CREATION, AV FISTULA;  Surgeon: Tirso Tavera MD;  [facility];  Service: Peripheral Vascular;  Laterality: Left;  Brachio basilic 1st stage     BACK SURGERY  2017    CHOLECYSTECTOMY  2015    [facility]    COLONOSCOPY  11/13/2015    Dr Chino torres prep. internal hemorrhoids. diverticulosis of sigmois. NO polyps. repeat due 2025    FISTULOGRAM N/A 12/16/2022    Procedure: Fistulogram;  Surgeon: Tirso Tavera MD;  Location: [facility] CATH LAB;  Service: Peripheral Vascular;  Laterality: N/A;    FISTULOGRAM, WITH PTA Left 3/23/2023    Procedure: FISTULOGRAM, WITH PTA;  Surgeon: Tirso Tavera MD;  Location: [facility] OR [facility] FLR;  Service: Peripheral Vascular;  Laterality: Left;  LUE TRANSRADIAL CO2 FISTULOGRAM    1.9 min  22.99 mGy  4.3032 Gy.cm  200cc CO2  9ml TransRadial Solution    FISTULOGRAM, WITH PTA Left 8/15/2023    Procedure: FISTULOGRAM, WITH PTA TRANSRADIAL;  Surgeon: Tirso Tavera MD;  Location: [facility] OR [facility] FLR;  Service: Peripheral Vascular;  Laterality: Left;  Fluoro: 5.1 min, mGy: 7.95, Gycm2: 1.2626, contrast: 12ml    FIXATION KYPHOPLASTY Bilateral 08/14/2019    Procedure: L2 kyphoplasty Fluoro Globus;  Surgeon: Jeremie Kwon DO;  Location: [facility] OR;  Service: Neurosurgery;  Laterality: Bilateral;  GLOBUS MACRINA WINSLOW [phone] TEXTED HER @ 10:54AM ON 7-30-19  PRE-OP-BY RN  8-7-2019     HYSTERECTOMY      OOPHORECTOMY  1996    one    PERCUTANEOUS TRANSLUMINAL ANGIOPLASTY OF ARTERIOVENOUS FISTULA Left 12/16/2022    Procedure: PTA, AV FISTULA;  Surgeon: Tirso Tavera MD;  Location: [facility] CATH LAB;  Service: Peripheral Vascular;  Laterality: Left;    RIGHT HEART CATHETERIZATION Right 6/15/2023    Procedure: INSERTION, CATHETER, RIGHT HEART;  Surgeon: Lucretia Rivas MD;  Location: [facility] CATH LAB;  Service: Cardiology;  Laterality: Right;    THYROIDECTOMY  2016    at [facility] for thyroid nodule    TONSILLECTOMY      TRANSPOSITION OF BASILIC VEIN Left 1/31/2023    Procedure: TRANSPOSITION, VEIN, BASILIC;  Surgeon: Tirso Tavera MD;  Location: [facility] OR [address] [place] [address];  Service: Peripheral Vascular;  Laterality: Left;     Family History   Problem Relation Age of Onset    Stroke Mother     Heart disease Mother     Hypertension Mother     Diabetes type II Mother     Coronary artery disease Mother     Hypertension Father     Prostate cancer Father     Aneurysm Brother      Social History     Tobacco Use    Smoking status: Never     Passive exposure: Past    Smokeless tobacco: Never   Substance Use Topics    Alcohol use: Not Currently    Drug use: No     Review of patient's allergies indicates:   Allergen Reactions    Atorvastatin Other (See Comments)     Statin induced myalgia    Mushroom Hives    Peanut Anaphylaxis    Bactrim [sulfamethoxazole-trimethoprim] Hives    Gabapentin      Pruritis     Iodine     Peanut butter flavor     Penicillins Hives    Shellfish containing products Itching    Sulfa (sulfonamide antibiotics) Hives         Immunization History:  Received all childhood vaccines: Yes  All household members receive annual flu vaccine: Yes  All household members are up to date on COVID vaccine: Yes      Current antibiotics:  Antibiotics (From admission, onward)      None              Review of Systems  Review of Systems   Constitutional: Negative.   All other systems reviewed and are negative.          Objective  Physical Exam  Vitals reviewed.   Constitutional:       General: She is not in acute distress.     Appearance: Normal appearance. She is normal weight. She is not ill-appearing, toxic-appearing or diaphoretic.   HENT:      Nose: No congestion.   Eyes:      General: No scleral icterus.     Conjunctiva/sclera: Conjunctivae normal.   Cardiovascular:      Rate and Rhythm: Normal rate.   Pulmonary:      Effort: No respiratory distress.   Musculoskeletal:         General: No swelling or tenderness.   Skin:     General: Skin is warm and dry.      Findings: No rash.   Neurological:      Mental Status: She is alert and oriented to person, place, and time. Mental status is at baseline.   Psychiatric:         Behavior: Behavior normal.         Thought Content: Thought content normal.           Labs:    CBC:   Lab Results   Component Value Date    WBC 11.82 08/10/2023    HGB 10.9 (L) 08/10/2023    HCT 33.6 (L) 08/10/2023    MCV 87 08/10/2023     08/10/2023    GRAN 8.4 (H) 08/10/2023    GRAN 70.7 08/10/2023    LYMPH 2.4 08/10/2023    LYMPH 20.6 08/10/2023    MONO 0.7 08/10/2023    MONO 5.5 08/10/2023    EOSINOPHIL 2.1 08/10/2023       Syphilis screening:   Lab Results   Component Value Date    RPR Non-reactive 11/17/2022        TB screening:   Lab Results   Component Value Date    TBGOLDPLUS Negative 07/10/2023       HIV screening:   Lab Results   Component Value Date    GEL50PROI Non-reactive 11/17/2022       Strongyloides IgG:   Lab Results   Component Value Date    STRONGANTIGG Negative 03/16/2023       Hepatitis Serologies:   Lab Results   Component Value Date    HEPBCAB Reactive (A) 07/10/2023    HEPBSAB 95.52 07/10/2023    HEPBSAB Reactive 07/10/2023    HEPBSURFABQU POSITIVE 11/17/2022    HEPBSURFABQU 130 11/17/2022    HEPCAB Reactive (A) 11/17/2022        Varicella IgG:   Lab Results   Component Value Date    VARICELLAINT Positive (A) 11/17/2022         Immunization History   Administered Date(s)  Administered    COVID-19 MRNA, LN-S PF (MODERNA HALF 0.25 ML DOSE) 11/19/2021    COVID-19, mRNA, LNP-S, bivalent booster, PF (PFIZER OMICRON) 09/19/2022    COVID-19, vector-nr, rS-Ad26, PF (Sukhedep) 03/05/2021    Influenza - Quadrivalent - PF *Preferred* (6 months and older) 01/31/2019, 11/08/2019, 11/03/2020, 11/19/2021, 09/19/2022    PPD Test 05/25/2019, 02/07/2023    Pneumococcal Conjugate - 13 Valent 11/08/2019    Pneumococcal Polysaccharide - 23 Valent 10/18/2018    Tdap 12/16/2017, 10/18/2018    Zoster Recombinant 01/08/2020, 06/23/2020          Assessment and Plan    1. Risks of Infection: Available serologies were reviewed. No unusual risks of infection or significant barriers to transplantation were identified from the infectious disease standpoint given the information available at this time.    - Acute infectious issues: None   - Pending serologies: -- None   - Please call if any pending serologic testing is positive.    2. Immunizations:  Based on the patient's immunization history and serologies, the following immunizations are recommended:  - Hepatitis A    Patient does not have immunity to hepatitis A    Vaccination ordered today: Yes   - Hepatitis B    Patient does have immunity to hepatitis B    Vaccination ordered today: No. Reason for not ordering: Immunity   - COVID    Current CDC vaccination recommendations were discussed with the patient   - Annual high dose influenza     Vaccination ordered today: Yes   - Prevnar 20    Vaccination ordered today: No. Reason for not ordering: vaccination up to date   - Tdap    Vaccination ordered today: No. Reason for not ordering: vaccination up to date   - Shingrix    Vaccination ordered today: Yes    Recommended Pre-Transplant Immunization Schedule   Vaccine  0m 1m 2m 6m   Pneumococcal conjugate vaccine (Prevnar 20) X      Tetanus-diphtheria-pertussis (Tdap)* X      Hepatitis A Vaccine (Havrix)** X   X   Hepatitis B Vaccine (Heplisav)** X X     Influenza  (annual) X      Zoster Recombinant Vaccine (Shingrix) X  X           *Administer booster every 10 years.       **Administer if no immunity demonstrated on serologies               Patient will receive vaccines at local pharmacy. A written prescription was provided for all vaccine doses.     3. Counseling:   I discussed with the patient the risk for increased susceptibility to infections following transplantation including increased risk for infection right after transplant and if rejection should occur.  The patient has been counseled on the importance of vaccinations to decrease risk of infection and severe illness. Specific guidance has been provided to the patient regarding the patient's occupation, hobbies and activities to avoid future infectious complications.     4. Transplant Candidacy: Based on available information, there are no identified significant barriers to transplantation from an infectious disease standpoint.  Final determination of transplant candidacy will be made once evaluation is complete and reviewed by the Selection Committee.      Follow up with infectious disease as needed.       The total time for evaluation and management services performed on 08/31/2023 was greater than 30 minutes.

## 2023-08-31 NOTE — PROGRESS NOTES
INITIAL PATIENT EDUCATION NOTE    Ms. Kathie Quezada was seen in pre-kidney transplant clinic for evaluation for kidney, kidney/pancreas or pancreas only transplant.  The patient attended a an individual video education session that discussed/reviewed the following aspects of transplantation: evaluation including diagnostic and laboratory testing,( Chemistries, Hematology, Serologies including HIV and Hepatitis and HLA) required for transplantation and selection committee process, UNOS waitlist management/multiple listings, types of organs offered (KDPI < 85%, KDPI > 85%, PHS risk, DCD, HCV+, HIV+ for HIV+ recipients and enbloc/dual), financial aspects, surgical procedures, dietary instruction pre- and post-transplant, health maintenance pre- and post-transplant, post-transplant hospitalization and outpatient follow-up, potential to participate in a research protocol, and medication management and side effects.  A question and answer session was provided after the presentation.    The patient was seen by all members of the multi-disciplinary team to include: Nephrologist/GORDON, Surgeon, , Transplant Coordinator, , Pharmacist and Dietician (if applicable).    The patient reviewed and signed all consents for evaluation which were witnessed and sent to scanning into the [facility] chart.    The patient was given an education book and plan for further evaluation based on her individual assessment.      Reviewed program requirement for complete COVID vaccination with documentation prior to listing.  COVID education information reviewed with patient. Patient encouraged to be up to date on all vaccinations.       The patient was informed that the transplant team would manage immediate post op pain. If the patient requires long term pain management, they will need to have that pain management addressed by their PCP or previous provider who wrote for long term pain medicines.    The patient  was encouraged to call with any questions or concerns.

## 2023-08-31 NOTE — Clinical Note
September 1, 2023        Fred Kelly  [address]  [facility] [address]  Phone: [phone]  Fax: [phone]             [facility] 1st Fl  [address]  [facility] [address]  Phone: [phone]   Patient: Kathie Quezada   MR Number: [id number]   YOB: 1960   Date of Visit: 8/31/2023       Dear Dr. Fred Kelly    Thank you for referring Kathie Quezada to me for evaluation. Attached you will find relevant portions of my assessment and plan of care.    If you have questions, please do not hesitate to call me. I look forward to following Kathie Quezada along with you.    Sincerely,    Mervat Rashid, NP    Enclosure    If you would like to receive this communication electronically, please contact [email or web address] or [phone] to request [employer] access.    [employer] is a tool which provides read-only access to select patient information with whom you have a relationship. Its easy to use and provides real time access to review your patients record including encounter summaries, notes, results, and demographic information.    If you feel you have received this communication in error or would no longer like to receive these types of communications, please e-mail [email or web address]

## 2023-09-05 ENCOUNTER — PATIENT MESSAGE (OUTPATIENT)
Dept: TRANSPLANT | Facility: CLINIC | Age: 63
End: 2023-09-05
Payer: MEDICARE

## 2023-09-05 NOTE — PROGRESS NOTES
Transplant Recipient Adult Psychosocial Assessment  Last [facility] transplant psychosocial [facility]    Kathie Quezada  [address]  [place] [address]  Telephone Information:   Mobile [phone]   Home  [phone] (home)  Work  There is no work phone number on file.  E-mail  [email or web address]    Sex: female  YOB: 1960  Age: 63 y.o.    Encounter Date: 2023  [place] Citizen: yes  Primary Language: English   Needed: no    Emergency Contact:  Mike Quezada, 46 yo daughter, Perez BRANNON, does drive/own car, works at [employer]. [phone]    Family/Social Support:   Number of dependents/: pt denies  Marital history:  over 20 years ago;   of MI  Other family dynamics: Pt reports is  and primarily lives alone in [facility] (son Giovanni stays with her when he is not working). Pt reports 5 supportive grown children who will assist with transplant as needed. Pt reports supportive daughter in law Nicole Quezada ([facility]). Pt reports grown children and daughter in law will assist with transplant as caregivers as needed. Pt noted daughter Mike Quezada and daughter in law Nicole Quezada will be primary transplant caregivers.    Household Composition:  Pt reports son Giovanni Quezada is interstate  and stays at patient's home when off work. Pt reports carlos Davila will take off work to assist patient for transplant.  Giovanni Quezada, 40 yo son, SURI, interstate . [phone]    Do you and your caregivers have access to reliable transportation? yes  PRIMARY CAREGIVER: Mike Quezada, daughter, will be primary caregiver, phone number [phone]     provided in-depth information to patient and caregiver regarding pre- and post-transplant caregiver role.   strongly encourages patient and caregiver to have concrete plan regarding post-transplant care giving, including back-up  caregiver(s) to ensure care giving needs are met as needed.    Patient and Caregiver states understanding all aspects of caregiver role/commitment and is able/willing/committed to being caregiver to the fullest extent necessary.    Patient and Caregiver verbalizes understanding of the education provided today and caregiver responsibilities.         remains available. Patient and Caregiver agree to contact  in a timely manner if concerns arise.      Able to take time off work without financial concerns: yes.     Additional Significant Others who will Assist with Transplant:  Nicole Holguin, 43 yo daughter in law, SURI, does drive/own car, works [employer] manager. [phone]  Cristhian Holguin, 40 yo daughter, SURI, does drive/own car, works as RTA . [phone]  Kel Holguin, 37 yo daughter, SURI, does drive/own car, works from home as [employer] as . [phone]    Living Will: no  Healthcare Power of : no  Advance Directives on file: <<no information> per medical record.  Verbally reviewed LW/HCPA information.   provided patient with copy of LW/HCPA documents and provided education on completion of forms.    Living Donors: Education and resource information given to patient.    Highest Education Level: High School (9-12) or GED  Reading Ability: 12th grade  Reports difficulty with: wears glasses. Pt denies any problems learning new information.  Learns Best By:  multisensory     Status: no  VA Benefits: no     Working for Income: No  If no, reason not working: [id number] due to CHF; pt reports is ESRD/on dialysis    Spouse/Significant Other Employment: pt reports  is .     Disabled: [id number] due to CHF; pt reports is ESRD/on dialysis    Monthly Income:  $1,100 disability income  Able to afford all costs now and if transplanted, including medications: yes  Patient and Caregiver verbalizes understanding  of personal responsibilities related to transplant costs and the importance of having a financial plan to ensure that patients transplant costs are fully covered.       provided fundraising information/education. Patient and Caregiververbalizes understanding.   remains available.    Insurance:   Payer/Plan Subscr  Sex Relation Sub. Ins. ID Effective Group Num   1. HU[employer]NA MANAGE* KIRSTIE OSORIO* 1960 Female Self [id number] 20 [id number]                                   [address] [address]   2. [place]* KIRSTIE OSORIO* 1960 Female Self [id number]* [id number]                                    [address] [address]     Primary Insurance (for UNOS reporting): Public Insurance - Medicare & Choice  Secondary Insurance (for UNOS reporting): Public Insurance - Medicaid  Patient and Caregiver verbalizes clear understanding that patient may experience difficulty obtaining and/or be denied insurance coverage post-surgery. This includes and is not limited to disability insurance, life insurance, health insurance, burial insurance, long term care insurance, and other insurances.      Patient and Caregiver also reports understanding that future health concerns related to or unrelated to transplantation may not be covered by patient's insurance.  Resources and information provided and reviewed.     Patient and Caregiver provides verbal permission to release any necessary information to outside resources for patient care and discharge planning.  Resources and information provided are reviewed.      Bella MCCORD on [facility], [phone].  Hemodialysis: MWF 3 hours.    Dialysis Adherence: Patient and Caregiver reports high dialysis compliance with treatments and instructions within last 3 months.  Dialysis compliance update requested.    Infusion Service: patient utilizing? no  Home Health: patient utilizing? no  DME: no  Pulmonary/Cardiac Rehab: pt denies at this time. Pt reports has done Cardiac  Rehab a long time ago but is not longer engaged in it.   ADLS:  independent with self care and medication management. Pt reports does not drive and family assists with all transportation including for dialysis.    Adherence:   Pt reports high medical compliance with medical appointments and instructions within last 3 months .  Adherence education and counseling provided.     Per History Section:  Past Medical History:   Diagnosis Date    Acute respiratory failure with hypoxia and hypercapnia 05/26/2019    ATN (acute tubular necrosis) 02/08/2019    Chronic diastolic heart failure 02/07/2019    2-8-19 Mild left atrial enlargement. Mild left ventricular enlargement. Normal left ventricular systolic function. The estimated ejection fraction is 55% Indeterminate left ventricular diastolic function. Normal right ventricular systolic function. Mild mitral regurgitation. Mild to moderate tricuspid regurgitation. The estimated PA systolic pressure is 32 mm Hg Normal central venous pressure (3 m    CKD (chronic kidney disease) stage 4, GFR 15-29 ml/min 05/16/2018    CKD stage G3a/A3, GFR 45-59 and albumin creatinine ratio >300 mg/g 05/16/2018    CKD stage G3b/A3, GFR 30-44 and albumin creatinine ratio >300 mg/g 05/16/2018    Closed compression fracture of L2 lumbar vertebra 05/24/2019    Closed compression fracture of L2 lumbar vertebra 05/24/2019     IMO Regulatory Load October 2019    Controlled type 2 diabetes with retinopathy 04/09/2018    COVID-19 virus infection 7-1-2021 07/01/2021    CVA (cerebral vascular accident) 2017    Diabetic polyneuropathy associated with type 2 diabetes mellitus 12/04/2020    Encounter for blood transfusion     Essential hypertension 04/09/2018    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  7-31-19 Factor XI Activity 55 - 145 % 96       Gastroesophageal reflux disease without esophagitis 05/24/2019    Generalized anxiety disorder 11/08/2019    GERD (gastroesophageal reflux  disease)     GIB (gastrointestinal bleeding) 10/18/2018    History of CVA (cerebrovascular accident) without residual deficits 11/27/2022    History of hepatitis C     s/p succesful Rx w/ Harvoni  - [id number] (cure) 2016    Hypertensive retinopathy, bilateral 12/10/2019    Mild nonproliferative diabetic retinopathy 05/02/2018    Mixed hyperlipidemia 04/09/2018    Myalgia due to statin 09/07/2022    Normocytic anemia 01/14/2022    NS (nuclear sclerosis), bilateral 12/10/2019    Peanut allergy 02/04/2023    PNA (pneumonia) 05/28/2019    Postoperative hypothyroidism     Renovascular hypertension 04/09/2018    S/P kyphoplasty 08/14/2019 8/14/19: L2 kyphoplasty with Dr. Kwon     Slow transit constipation 05/27/2019    Type 2 diabetes mellitus with circulatory disorder, with long-term current use of insulin 04/09/2018    Type 2 diabetes mellitus with stage 3 chronic kidney disease, without long-term current use of insulin 04/09/2018    Type 2 diabetes mellitus with stage 3a chronic kidney disease, without long-term current use of insulin 03/03/2021    Type 2 diabetes mellitus with stage 3b chronic kidney disease, without long-term current use of insulin 09/07/2022     Social History     Tobacco Use    Smoking status: Never     Passive exposure: Past    Smokeless tobacco: Never   Substance Use Topics    Alcohol use: Not Currently     Social History     Substance and Sexual Activity   Drug Use No     Social History     Substance and Sexual Activity   Sexual Activity Not Currently       Per Today's Psychosocial:  Tobacco: none, patient denies any use.  Alcohol: none, patient denies any use.  Illicit Drugs/Non-prescribed Medications: none, patient denies any use.    Patient and Caregiver states clear understanding of the potential impact of substance use as it relates to transplant candidacy and is aware of possible random substance screening.  Substance abstinence/cessation counseling, education and resources provided and  reviewed.     Arrests/DWI/Treatment/Rehab: patient denies    Psychiatric History:    Mental Health: Pt reports history of stuggling with sister's death and discussed it with PCP. Pt reports was prescribed Lexapro 10mg and pt reports it does help her. Pt reports is taking Trazodone 50 mg for sleep as needed. Pt reports is coping well and denies any current mental health concerns at this time. Pt denies any need for mental health referral at this time.  Psychiatrist/Counselor: pt denies  Medications:  Lexapro 10 mg and Trazodone 50 mg with both prescribed by PCP  Suicide/Homicide Issues: pt denies and si/hi history   Safety at home: pt repots living in safe home environment with no abuse at this time.    Knowledge: Patient and Caregiver states having clear understanding and realistic expectations regarding the potential risks and potential benefits of organ transplantation and organ donation and agrees to discuss with health care team members and support system members, as well as to utilize available resources and express questions and/or concerns in order to further facilitate the pt informed decision-making.  Resources and information provided and reviewed.    Patient and Caregiver is aware of [facility]'s affiliation and/or partnership with agencies in home health care, LTAC, SNF, [facility], and other hospitals and clinics.    Understanding: Patient and Caregiver reports having a clear understanding of the many lifetime commitments involved with being a transplant recipient, including costs, compliance, medications, lab work, procedures, appointments, concrete and financial planning, preparedness, timely and appropriate communication of concerns, abstinence (ETOH, tobacco, illicit non-prescribed drugs), adherence to all health care team recommendations, support system and caregiver involvement, appropriate and timely resource utilization and follow-through, mental health counseling as needed/recommended, and patient and  caregiver responsibilities.  Social Service Handbook, resources and detailed educational information provided and reviewed.  Educational information provided.    Patient and Caregiver also reports current and expected compliance with health care regime and states having a clear understanding of the importance of compliance.      Patient and Caregiver reports a clear understanding that risks and benefits may be involved with organ transplantation and with organ donation.       Patient and Caregiver also reports clear understanding that psychosocial risk factors may affect patient, and include but are not limited to feelings of depression, generalized anxiety, anxiety regarding dependence on others, post traumatic stress disorder, feelings of guilt and other emotional and/or mental concerns, and/or exacerbation of existing mental health concerns.  Detailed resources provided and discussed.      Patient and Caregiver agrees to access appropriate resources in a timely manner as needed and/or as recommended, and to communicate concerns appropriately.  Patient and Caregiver also reports a clear understanding of treatment options available.     Patient and Caregiver received education in a group setting.   reviewed education, provided additional information, and answered questions.    Feelings or Concerns: Pt reports high motivation to pursue kidney organ transplant at this time.    Coping: Identify Patient & Caregiver Strategies to [place]:   1. In the past, coping with major surgery and/or related stress - family support, watch tv, cooking, crafting, grandkids, selina and prayer.    2. Currently & Pre-transplant - family support, watch tv, cooking, crafting, grandkids, selina and prayer.   3. At the time of surgery - family support, watch tv, grandkids, selina and prayer.   4. During post-Transplant & Recovery Period - family support, watch tv, grandkids, selina and prayer.    Goals: Pt reports hope for successful  kidney organ transplant so that she can discontinue dialysis and have healthier life. Patient referred to Vocational Rehabilitation.    Interview Behavior: Patient and Caregiver presents as alert and oriented x 4, pleasant, good eye contact, well groomed, recall good, concentration/judgement good, average intelligence, calm, communicative, cooperative, and asking and answering questions appropriately. Pt's highly supportive daughter Mike in session with patient's permission.         Transplant Social Work - Candidacy  Assessment/Plan:     Psychosocial Suitability: Patient presents as low risk candidate for kidney transplant at this time. Based on psychosocial risk factors, patient presents as low risk due to patient denying psychosocial barriers to kidney organ transplant at this time. Pt reports having organ transplant caregiver/transportation plan, medical insurance plan and plan to afford transplant costs all in place at this time.    Recommendations/Additional Comments: Dialysis compliance update requested.    SW recommends that pt conduct fundraising to assist pt with pay for cost of medications, food, gas, and other transplant related needs.  SW recommends that pt remain aware of potential mental health concerns and contact the team if any concerns arise.  SW recommends that pt remain abstinent from tobacco, ETOH, and drug use.  SW supports pt's continued adherence. SW remains available to answer any questions or concerns that arise as the pt moves through the transplant process.     Final determination of transplant candidacy will be reviewed by the selection committee.       María Elena NAQVIW

## 2023-09-06 NOTE — PROGRESS NOTES
PHARM.D. PRE-TRANSPLANT NOTE:    This patient's medication therapy was evaluated as part of her pre-transplant evaluation.      The following general pharmacologic concerns were noted: NONE    The following concerns for post-operative pain management were noted: NONE    The following pharmacologic concerns related to HCV therapy were noted: NONE      This patient's medication profile was reviewed for considerations for DAA Hepatitis C therapy:    [X]  No current inducers of CYP 3A4 or PGP  [X]  No amiodarone on this patient's EMR profile in the last 24 months  [X]  No past or current atrial fibrillation on this patient's EMR profile       Current Outpatient Medications   Medication Sig Dispense Refill    calcium acetate,phosphat bind, (PHOSLO) 667 mg capsule Take 1 capsule (667 mg total) by mouth 3 (three) times daily with meals. 90 capsule 2    carvediloL (COREG) 25 MG tablet Take 1 tablet (25 mg total) by mouth 2 (two) times daily with meals. 180 tablet 3    cholecalciferol, vitamin D3, 125 mcg (5,000 unit) Tab Take 1 tablet (5,000 Units total) by mouth once daily. 90 tablet 3    EScitalopram oxalate (LEXAPRO) 10 MG tablet Take 1 tablet (10 mg total) by mouth once daily. (Patient taking differently: Take 10 mg by mouth every evening.) 90 tablet 3    ferrous gluconate (FERGON) 324 MG tablet Take 1 tablet (324 mg total) by mouth daily with breakfast. 90 tablet 1    furosemide (LASIX) 40 MG tablet Take 2 tablets (80 mg total) by mouth 2 (two) times a day. 120 tablet 3    NIFEdipine (PROCARDIA-XL) 30 MG (OSM) 24 hr tablet Take 1 tablet (30 mg total) by mouth 2 (two) times a day. 60 tablet 2    ondansetron (ZOFRAN-ODT) 4 MG TbDL Take 1 tablet (4 mg total) by mouth every 12 (twelve) hours as needed (nausea). 30 tablet 0    SYNTHROID 200 mcg tablet Take 1 tablet (200 mcg total) by mouth before breakfast. 90 tablet 3    traZODone (DESYREL) 50 MG tablet Take 1 to 2 tablets ( mg total) by mouth nightly as needed for  Insomnia. 120 tablet 3    levocetirizine (XYZAL) 5 MG tablet Take 1 tablet (5 mg total) by mouth daily as needed for Allergies. (Patient not taking: Reported on 8/31/2023) 90 tablet 3    OPW TEST CLAIM - DO NOT FILL OPW test claim. Do not fill. 1 tablet 0     No current facility-administered medications for this visit.           I am available for consultation and can be contacted, as needed by the other members of the Transplant team.

## 2023-09-07 ENCOUNTER — EPISODE CHANGES (OUTPATIENT)
Dept: TRANSPLANT | Facility: CLINIC | Age: 63
End: 2023-09-07

## 2023-09-12 NOTE — PROGRESS NOTES
"Occupational Therapy   Treatment    Name: Bhupendra Park  MRN: [id number]    Recommendations:     Discharge Recommendations: rehabilitation facility  Discharge Equipment Recommendations:  to be determined by next level of care  Barriers to discharge:   (ongoing medical needs; severity of deficits)    Assessment:     Bhupendra Park is a 65 y.o. male with a medical diagnosis of large L frontal parenchymal hematoma, intraventricular hemorrhage. Hx: CVA c R spasticity and weakness. Performance deficits affecting function are weakness, impaired functional mobility, impaired endurance, gait instability, impaired balance, decreased upper extremity function, abnormal tone, impaired self care skills, decreased ROM, decreased lower extremity function. Pt not easily aroused throughout session; minimally conscious throughout. Was able to nod "yes" to getting to the chair after wife persuaded.    Wife stated that Pt's PLOF was IND with all ADLs, ambulating with quad cane; however incontinent, so would occasionally require assist with posterior pericare. Was going to outpatient PT for previous stroke deficits. Wife reports that Pt has AFO and multiple splints, OT requesting she bring them to continue the Pt's current wearing schedule.     Rehab Prognosis:  Good; patient would benefit from acute skilled OT services to address these deficits and reach maximum level of function.       Plan:     Patient to be seen 5 x/week to address the above listed problems via self-care/home management, therapeutic activities, therapeutic exercises  Plan of Care Expires: 09/25/23  Plan of Care Reviewed with: patient, family    Subjective     Pain/Comfort:  Pain Rating 1: 0/10    Objective:     Communicated with: LOIDA prior to session.  Patient found HOB elevated with blood pressure cuff, Condom Catheter, peripheral IV, pulse ox (continuous), telemetry upon OT entry to room.    General Precautions: Standard, aphasia, fall (BP <140)  " [facility]  Follow-up Visit Progress Note     PRESENTING HISTORY     PCP: Elan Price MD  Chief Complaint/Reason for Visit:  Follow up from recent visit.      No chief complaint on file.      History of Present Illness & ROS: Ms. Kathie Quezada is a 58 y.o. female.     Anne Moran MD   Resident   Hospital Medicine   Discharge Summary   Attested                  Attestation signed by Mahendra Trujillo IV, MD at 1/18/2019 9:08 AM   I have seen the patient, reviewed the Resident's history and physical, assessment and plan. I have personally interviewed and examined the patient at bedside and: agree with the findings with addenda below:      Active Hospital Problems     Diagnosis   POA    CKD stage G3a/A3, GFR 45-59 and albumin creatinine ratio >300 mg/g [N18.3]   Yes    Type 2 diabetes mellitus with circulatory disorder, with long-term current use of insulin [E11.59, Z79.4]   Not Applicable    Postoperative hypothyroidism [E89.0]   Yes    Essential hypertension [I10]   Yes       Resolved Hospital Problems     Diagnosis Date Resolved POA    *Hypertensive emergency [I16.1] 01/17/2019 Yes    ARUNA (acute kidney injury) [N17.9] 01/17/2019 Yes    Chest pain [R07.9] 01/17/2019 Yes      58 y.o. female with a history of CVA, IDDM, CKD III, HTN presented from clinic with SBP of >200 with headache, CP and blurry vision. Head CT negative, admitted to MICU on cardene gtt and restarted home meds. She had a list of meds but was unable to clarify which ones she was on and per MICU did not seem like taking her thiazide. BP controlled and stepped down to hospital medicine. She had a mild ARUNA which resolved to baseline CKD. ECHO obtained and showed indeterminate diastolic dysfunction and JT which is likely 2/2 long term HTN which I discussed with patient and importance of control going forward to prevent progression. She also had some peripheral neuropathy that low dose emilee helped in past. Discharged home in stable    Orthopedic Precautions:N/A  Braces: N/A  Respiratory Status: Room air  Vital Signs:     135/69 98% 60 bpm     Occupational Performance:     Bed Mobility:    Patient completed Supine to Sit with total assistance and 2 persons     Functional Mobility/Transfers:  Patient completed Sit <> Stand Transfer with total assistance and of 2 persons  with  gait belt   Patient completed Bed <> Chair Transfer using Squat Pivot technique with total assistance and of 2 persons with gait belt ; Pt not attempting to assist during transfer    Therapeutic Positioning    OT interventions performed during the course of today's session in an effort to prevent and/or reduce acquired pressure injuries:   Education on Pressure Ulcer Prevention provided  Therapeutic positioning completed     Skin assessment: full body skin assessment was performed    Findings:  bruising/discoloration on RUE near elbow crease, possibly from BP cuff;  RN alerted and cuff placed on other extremity      Patient Education:  Patient and family provided with verbal education regarding OT role/goals/POC, post op precautions, safety awareness, Discharge/DME recommendations, and pressure ulcer prevention.  Additional teaching is warranted.      Patient left up in chair with all lines intact, call button in reach, NSG/family present, and jean-claude pad    GOALS:   Multidisciplinary Problems       Occupational Therapy Goals          Problem: Occupational Therapy    Goal Priority Disciplines Outcome Interventions   Occupational Therapy Goal     OT, PT/OT Ongoing, Progressing    Description: LTG: Pt will perform basic ADLs and ADL transfers with SPV using LRAD by discharge.    STG: to be met in 2 weeks    Pt will complete grooming standing at sink with LRAD with min A.  Pt will complete UB dressing with min A.  Pt will complete LB dressing with min A using LRAD.  Pt will complete toileting with min A using LRAD.  Pt will complete t/f to bsc with min A using LRAD.             condition on current regimen I reviewed with her which seemed to provide adequate control and can be titrated on outpatient basis. In addition we discussed the long term consequences of long term hypertension including worsening chronic renal function or cardiac trouble or strokes or heart attacks. Gabapentin Rx too with instructions only to take at night and not if drinking, driving or anything where being tired would be dangerous.      Note headache was RESOLVED on discharge     Mahendra Trujillo MD  Department of Hospital Medicine  [facility]  [phone]                      []Hide copied text    []Leidy for details      [facility] Hospital Medicine  Discharge Summary        Patient Name: Kathie Quezada  MRN: [id number]  Admission Date: 1/14/2019  Hospital Length of Stay: 3 days  Discharge Date and Time:  01/17/2019 9:25 PM  Attending Physician: Elvira att. providers found   Discharging Provider: Anne Moran MD  Primary Care Provider: Elan Price MD  [facility] Medicine Team: [facility]OS[facility] MED 5 Anne Moran MD     HPI:   Ms Quezada is 58 y.o. female with a history of CVA, IDDM, CKD III, HTN who presents from the Lab work scheduled this morning at the clinic when she was referred to the ED for hypertension of systolic of >200 with headache, CP and blurry vision. At ED patient complained of left sided chest pain that radiated down to her left arm. Patient describes the pain as sharp pain not dependent on movement. Associated symptoms include blurry vision, SOB, nausea, fatigue, headache, back pain, and increased frequency of urination. She had an episode of clear emesis this morning which she did not see any of her daily pills with her emesis. Patient said she is compliant with all of her medication, however going through her home meds she was not taking her chlorthalidone. Denies abdominal pain, hematuria, wounds, rashes, erythema, or sores. Patient describes cold-like symptoms starting 3 days ago which                Time Tracking:     OT Date of Treatment:    OT Start Time: 0944  OT Stop Time: 1004  OT Total Time (min): 20 min    Billable Minutes:Therapeutic Activity 1    OT/MANA: OT     Number of MANA visits since last OT visit: 1    9/12/2023       resolved. Patient did not have any sick contact or travel history recently. Patient is compliant with her medications and took all today except her insulin.   At ED, patient received labetalol and nitroglycerin. Patient was started on nicardipine drip and systolic went below 160.         Hospital Course:   Patient was admitted to ICU started on cardene gtt. She was continued on lisinopril and coreg along with Chlorthalidone. CTH was negative for acute intracranial pathology. EKG w/NSR and negative for ischemic changes. Trop negative. Patient's blood pressure and symptoms improved and Cardene gtt was discontinued. She was stepped down to hospital medicine 1/16.     Interval History: No acute events overnight. Patient with SBPs 150s-160s this am. Denies chest pain or shortness of breath. Does continue to have HA.      Review of Systems   Constitutional: Negative for chills, fatigue and fever.   Respiratory: Negative for cough, choking, chest tightness and shortness of breath.    Cardiovascular: Negative for chest pain, palpitations and leg swelling.   Gastrointestinal: Negative for abdominal distention, abdominal pain, constipation, diarrhea, nausea and vomiting.   Neurological: Positive for headaches. Negative for dizziness, tremors, syncope and weakness.      Objective:      Vital Signs (Most Recent):  Temp: 97.7 °F (36.5 °C) (01/16/19 1059)  Pulse: 75 (01/16/19 1506)  Resp: 14 (01/16/19 0755)  BP: (!) 185/86 (01/16/19 1506)  SpO2: 95 % (01/16/19 1738) Vital Signs (24h Range):  Temp:  [97.7 °F (36.5 °C)-98.2 °F (36.8 °C)] 97.7 °F (36.5 °C)  Pulse:  [73-80] 75  Resp:  [14-29] 14  SpO2:  [95 %-98 %] 95 %  BP: (131-185)/(69-86) 185/86      Weight: 108.9 kg (240 lb)  Body mass index is 34.44 kg/m².     Intake/Output Summary (Last 24 hours) at 1/16/2019 1754  Last data filed at 1/16/2019 0611        Gross per 24 hour   Intake 480 ml   Output --   Net 480 ml      Physical Exam   Constitutional: She appears well-developed  and well-nourished. No distress.   HENT:   Head: Normocephalic and atraumatic.   Neck: Normal range of motion. Neck supple.   Cardiovascular: Normal rate, regular rhythm and normal heart sounds.   No murmur heard.  Pulmonary/Chest: Effort normal and breath sounds normal. No respiratory distress. She has no wheezes.   Abdominal: Soft. Bowel sounds are normal. She exhibits no distension. There is no tenderness.   Musculoskeletal: Normal range of motion. She exhibits no edema, tenderness or deformity.   Skin: Skin is warm and dry. Capillary refill takes less than 2 seconds. No erythema.   Psychiatric: She has a normal mood and affect. Her behavior is normal.      Consults:           Consults (From admission, onward)         Status Ordering Provider       Inpatient consult to Critical Care Medicine  Once     Provider:  (Not yet assigned)    OSWALDO Bartholomew                 Final Active Diagnoses:     Diagnosis Date Noted POA    CKD stage G3a/A3, GFR 45-59 and albumin creatinine ratio >300 mg/g [N18.3] 05/16/2018 Yes    Type 2 diabetes mellitus with circulatory disorder, with long-term current use of insulin [E11.59, Z79.4] 04/09/2018 Not Applicable    Postoperative hypothyroidism [E89.0] 04/09/2018 Yes    Essential hypertension [I10] 04/09/2018 Yes       Problems Resolved During this Admission:     Diagnosis Date Noted Date Resolved POA    PRINCIPAL PROBLEM:  Hypertensive emergency [I16.1] 01/14/2019 01/17/2019 Yes    ARUNA (acute kidney injury) [N17.9] 01/17/2019 01/17/2019 Yes    Chest pain [R07.9] 01/14/2019 01/17/2019 Yes         Discharged Condition: fair     Disposition: Home or Self Care     Follow Up:      Follow-up Information      Elan Price MD.    Specialty:  Hospitalist  Why:  appt scheduled for 1/31/19 @ 11a for hospital f/u  Contact information:  [address] [place] [id number] [phone]                      Patient Instructions:       Diet diabetic      Notify your  health care provider if you experience any of the following:  temperature >100.4      Notify your health care provider if you experience any of the following:  persistent nausea and vomiting or diarrhea      Notify your health care provider if you experience any of the following:  severe uncontrolled pain      Notify your health care provider if you experience any of the following:  redness, tenderness, or signs of infection (pain, swelling, redness, odor or green/yellow discharge around incision site)      Notify your health care provider if you experience any of the following:  difficulty breathing or increased cough      Notify your health care provider if you experience any of the following:  severe persistent headache      Notify your health care provider if you experience any of the following:  worsening rash      Notify your health care provider if you experience any of the following:  persistent dizziness, light-headedness, or visual disturbances      Notify your health care provider if you experience any of the following:  increased confusion or weakness      Activity as tolerated         Significant Diagnostic Studies: Labs:   CMP   Recent Labs   Lab 01/16/19  0559 01/16/19  1652 01/17/19  0633    141 141   K 3.2* 4.2 4.0    107 108   CO2 25 25 26   * 114* 119*   BUN 25* 24* 24*   CREATININE 2.1* 1.9* 1.9*   CALCIUM 8.2* 8.6* 9.0   PROT 6.2  --  6.8   ALBUMIN 2.5*  --  2.7*   BILITOT 0.4  --  0.3   ALKPHOS 72  --  79   AST 12  --  12   ALT 9*  --  9*   ANIONGAP 10 9 7*   ESTGFRAFRICA 29.7* 33.5* 33.5*   EGFRNONAA 25.7* 29.1* 29.1*   , CBC   Recent Labs   Lab 01/16/19  0559 01/17/19  0827   WBC 9.69 10.95   HGB 11.7* 12.8   HCT 37.0 41.6   * 143*   , INR         Lab Results   Component Value Date     INR 1.1 01/14/2019     INR 1.0 01/14/2019     INR 1.1 10/18/2018   , Lipid Panel         Lab Results   Component Value Date     CHOL 213 (H) 10/18/2018     HDL 76 (H) 10/18/2018      LDLCALC 115.2 10/18/2018     TRIG 109 10/18/2018     CHOLHDL 35.7 10/18/2018   , Troponin       Recent Labs   Lab 01/14/19  1502   TROPONINI <0.006    and A1C:        Recent Labs   Lab 10/18/18  1248 01/14/19  1241   HGBA1C 5.0 5.0      TTE (01/16/19)  Left Ventricle Normal ejection fraction at 60%. Normal left ventricular cavity size. Concentric observed. Indeterminate left ventricular diastolic function. No wall motion abnormalities.   Right Ventricle Normal cavity size, wall thickness and ejection fraction. Wall motion normal.   Left Atrium Severe atrial enlargement.   Right Atrium Normal atrial size.   Aortic Valve The valve is trileaflet. Presence of mild aortic annular calcification  No stenosis. No regurgitation. Mobility is normal   Mitral Valve Normal valve structure. No stenosis. Mild regurgitation. Normal leaflet mobility.   Tricuspid Valve The tricuspid valve is normal. No stenosis. Trace regurgitation. Mobility is normal. The estimated PA systolic pressure is 33 mmHg.   Pulmonic Valve No stenosis. Mild regurgitation.   IVC/SVC Normal central venous pressure (3 mm Hg).   Ascending Aorta The aortic root and ascending aorta are normal in size.   Pericardium No pericardial effusion. Pericardium is normal.      Assessment and plan:        * Hypertensive emergency     Presented to ED with SBP>200 and c/o HA, blurred vision, chest pain. Started on cardene gtt and sent to ICU. Resumed home BP meds. Amlodipine started. D/c'ed cardene gtt and stepped down to hospital medicine 1/16.  -On Coreg BID, Lisinopril, Chlorthalidone, Amlodipine   -Reports compliance with medications however does not recall which medications she is taking and refers to list. Unclear if actually taking chlorthalidone. States is compliant with low salt diet.  -Improvement with symptoms but noted to still have HA  -CT head (01/14/19) negative for intracranial abnormalities     Plan:  Transitioned to oral Norvasc, chlorthalidone, lisinopril,  and coreg 25 mg BID with BP range 130-145/75-90 mmHg   -- will continue these medications on outpatient basis  -- patient to follow up at [facility] clinic in 2 weeks for follow up blood pressure checks  - Echo EF 60% no valvular, wall motion abnormalities noted                CKD stage G3a/A3, GFR 45-59 and albumin creatinine ratio >300 mg/g     Admission Cr 1.7 which increased to 2.1  Avoid nephrotoxic agents. I/Os  Creatinine on discharge 1.9  Resumed ACEi      Essential hypertension     See hypertensive emergency      Type 2 diabetes mellitus with circulatory disorder, with long-term current use of insulin     Accuchecks and LDSSI      Postoperative hypothyroidism     C/w synthroid      Medications:  Reconciled Home Medications:       Medication List       START taking these medications    amLODIPine 10 MG tablet  Commonly known as:  NORVASC  Take 1 tablet (10 mg total) by mouth once daily.  Start taking on:  1/18/2019      gabapentin 100 MG capsule  Commonly known as:  NEURONTIN  Take 1 capsule (100 mg total) by mouth 3 (three) times daily.          CONTINUE taking these medications    atorvastatin 10 MG tablet  Commonly known as:  LIPITOR  TK 1 T PO QHS      blood sugar diagnostic Strp  To check BG 1 times daily, to use with insurance preferred meter      carvedilol 25 MG tablet  Commonly known as:  COREG  Take 25 mg by mouth 2 (two) times daily with meals.      chlorthalidone 25 MG Tab  Commonly known as:  HYGROTEN  Take 1 tablet (25 mg total) by mouth once daily.      escitalopram oxalate 10 MG tablet  Commonly known as:  LEXAPRO  Take 10 mg by mouth once daily.      insulin glargine 100 units/mL (3mL) SubQ pen  Commonly known as:  BASAGLAR KWIKPEN U-100 INSULIN  Inject 10 Units into the skin once daily.      lancets Misc  To check BG 1 times daily, to use with insurance preferred meter      lisinopril 40 MG tablet  Commonly known as:  PRINIVIL,ZESTRIL  Take 40 mg by mouth once daily.      pantoprazole 40 MG  "tablet  Commonly known as:  PROTONIX  Take 1 tablet (40 mg total) by mouth once daily.      pen needle, diabetic 32 gauge x 5/32" Ndle  Commonly known as:  BD ULTRA-FINE OLGA LIDIA PEN NEEDLE  To use with Lantus solostar insulin pens      silver sulfADIAZINE 1% 1 % cream  Commonly known as:  SILVADENE  Apply 1 application topically 2 (two) times daily.      SYNTHROID 200 MCG tablet  Generic drug:  levothyroxine  Take 1 tablet (200 mcg total) by mouth once daily. BRAND NAME ONLY      traZODone 50 MG tablet  Commonly known as:  DESYREL  Take 50 mg by mouth daily as needed.                 Follow-up Information      Elan Price MD.    Specialty:  Hospitalist  Contact information:  [address]  [place] [id number] [phone]         Indwelling Lines/Drains at time of discharge:       Lines/Drains/Airways            None          Time spent on the discharge of patient: 45 minutes  Patient was seen and examined on the date of discharge and determined to be suitable for discharge.     Anne Moran MD   Internal Medicine PGY II  Department of [place] [facility] Medicine  [facility]          Cosigned by: Mahendra Trujillo IV, MD at 1/18/2019  9:08 AM   Electronically signed by Anne Moran MD at 1/17/2019  9:34 PM  Electronically signed by Mahendra Trujillo IV, MD at 1/18/2019  9:08 AM   ·   ED to Hosp-Admission (Discharged) on 1/14/2019   ·     ·   Revision & Routing History   ·     ·   Detailed Report   ·     Ms Vázquez is here today for hospital follow up.   Since most recent discharge, reports taking all medications as prescribed.   Monitoring BP at home and BS.   Denies headaches, sob or chest pain.   Complaining of frequency with urination.   Complaining of "feeling sleepy".       Review of Systems:  Eyes: denies visual changes at this time denies floaters   ENT: no nasal congestion or sore throat  Respiratory: no cough or shorness of breath  Cardiovascular: no chest pain or palpitations  Gastrointestinal: " no nausea or vomiting, no abdominal pain or change in bowel habits  Genitourinary: no hematuria or dysuria; denies frequency  Hematologic/Lymphatic: no easy bruising or lymphadenopathy  Musculoskeletal: no arthralgias or myalgias  Neurological: no seizures or tremors  Endocrine: no heat or cold intolerance      PAST HISTORY:     Past Medical History:   Diagnosis Date    CHF (congestive heart failure)     CKD stage G3a/A3, GFR 45-59 and albumin creatinine ratio >300 mg/g 5/16/2018    CVA (cerebral vascular accident) 2017    Essential hypertension 4/9/2018    Factor XI deficiency 1978    GIB (gastrointestinal bleeding) 10/18/2018    History of hepatitis C     s/p succesful Rx w/ [facility] by Dr Herbert  - SVR12 (cure) 2016    Hyperlipidemia     Mixed hyperlipidemia 4/9/2018    Postoperative hypothyroidism     Proteinuria 4/9/2018    Type 2 diabetes mellitus with circulatory disorder, with long-term current use of insulin 4/9/2018       Past Surgical History:   Procedure Laterality Date    BACK SURGERY      COLONOSCOPY  11/13/2015    Dr Chino torres prep. internal hemorrhoids. diverticulosis of sigmois. NO polyps. repeat due 2025    galbladder      HYSTERECTOMY      OOPHORECTOMY      one    TONSILLECTOMY         Family History   Problem Relation Age of Onset    Diabetes type II Mother        Social History     Socioeconomic History    Marital status:      Spouse name: Not on file    Number of children: Not on file    Years of education: Not on file    Highest education level: Not on file   Social Needs    Financial resource strain: Not on file    Food insecurity - worry: Not on file    Food insecurity - inability: Not on file    Transportation needs - medical: Not on file    Transportation needs - non-medical: Not on file   Occupational History    Not on file   Tobacco Use    Smoking status: Never Smoker    Smokeless tobacco: Never Used   Substance and Sexual Activity    Alcohol  "use: Yes     Alcohol/week: 2.4 oz     Types: 4 Cans of beer per week     Comment: weekly    Drug use: No    Sexual activity: Not on file   Other Topics Concern    Not on file   Social History Narrative    Not on file       MEDICATIONS & ALLERGIES:     Current Outpatient Medications on File Prior to Visit   Medication Sig Dispense Refill    amLODIPine (NORVASC) 10 MG tablet Take 1 tablet (10 mg total) by mouth once daily. 30 tablet 11    atorvastatin (LIPITOR) 10 MG tablet TK 1 T PO QHS  1    blood sugar diagnostic Strp To check BG 1 times daily, to use with insurance preferred meter 35 strip 11    carvedilol (COREG) 25 MG tablet Take 25 mg by mouth 2 (two) times daily with meals.       chlorthalidone (HYGROTEN) 25 MG Tab Take 1 tablet (25 mg total) by mouth once daily. 30 tablet 3    escitalopram oxalate (LEXAPRO) 10 MG tablet Take 10 mg by mouth once daily.      gabapentin (NEURONTIN) 100 MG capsule Take 1 capsule (100 mg total) by mouth 3 (three) times daily. 90 capsule 3    insulin glargine (BASAGLAR KWIKPEN U-100 INSULIN) 100 unit/mL (3 mL) InPn pen Inject 10 Units into the skin once daily. 1 Box 1    lancets Misc To check BG 1 times daily, to use with insurance preferred meter 50 each 11    lisinopril (PRINIVIL,ZESTRIL) 40 MG tablet Take 40 mg by mouth once daily.      pantoprazole (PROTONIX) 40 MG tablet Take 1 tablet (40 mg total) by mouth once daily. 90 tablet 3    pen needle, diabetic (BD ULTRA-FINE OLGA LIDIA PEN NEEDLE) 32 gauge x 5/32" Ndle To use with Lantus solostar insulin pens 100 each 3    silver sulfADIAZINE 1% (SILVADENE) 1 % cream Apply 1 application topically 2 (two) times daily.      SYNTHROID 200 mcg tablet Take 1 tablet (200 mcg total) by mouth once daily. BRAND NAME ONLY 30 tablet 11    traZODone (DESYREL) 50 MG tablet Take 50 mg by mouth daily as needed.  0     No current facility-administered medications on file prior to visit.         Review of patient's allergies indicates: "   Allergen Reactions    Bactrim [sulfamethoxazole-trimethoprim] Hives    Codeine Hives    Penicillins Hives    Shellfish containing products Itching    Sulfa (sulfonamide antibiotics) Hives       Medications Reconciliation:   I have reconciled the patient's home medications and discharge medications with the patient/family. I have updated all changes.  Refer to After-Visit Medication List.    OBJECTIVE:     Vital Signs:  There were no vitals filed for this visit.  Wt Readings from Last 1 Encounters:   01/16/19 1506 108.9 kg (240 lb)   01/16/19 0141 108.9 kg (240 lb 0.3 oz)   01/14/19 1141 108.9 kg (240 lb)     There is no height or weight on file to calculate BMI.     Wt Readings from Last 3 Encounters:   01/31/19 115 kg (253 lb 8.5 oz)   01/16/19 108.9 kg (240 lb)   10/18/18 112.8 kg (248 lb 10.9 oz)     Temp Readings from Last 3 Encounters:   01/17/19 98 °F (36.7 °C)   07/13/18 97.6 °F (36.4 °C) (Oral)   05/16/18 97.9 °F (36.6 °C) (Oral)     BP Readings from Last 3 Encounters:   01/31/19 (!) 134/90   01/17/19 136/76   10/18/18 (!) 138/90     Pulse Readings from Last 3 Encounters:   01/31/19 71   01/17/19 75   10/18/18 83       Physical Exam:  General: Well developed, well nourished. No distress.  HEENT: Head is normocephalic, atraumatic; ears are normal.   Eyes: Clear conjunctiva.  Neck: Supple, symmetrical neck; trachea midline.  Lungs: Clear to auscultation bilaterally and normal respiratory effort.  Cardiovascular: Heart with regular rate and rhythm. No murmurs, gallops or rubs  Extremities: No LE edema. Pulses 2+ and symmetric.   Abdomen: Abdomen is soft, non-tender non-distended with normal bowel sounds.  Skin: Skin color, texture, turgor normal. No rashes.  Musculoskeletal: Normal gait.   Lymph Nodes: No cervical or supraclavicular adenopathy.  Neurologic: Normal strength and tone. No focal numbness or weakness.   Psychiatric: Not depressed.        Laboratory  Lab Results   Component Value Date    WBC  10.95 2019    HGB 12.8 2019    HCT 41.6 2019     (L) 2019    CHOL 213 (H) 10/18/2018    TRIG 109 10/18/2018    HDL 76 (H) 10/18/2018    ALT 9 (L) 2019    AST 12 2019     2019    K 4.0 2019     2019    CREATININE 1.9 (H) 2019    BUN 24 (H) 2019    CO2 26 2019    TSH 2.892 10/18/2018    INR 1.1 2019    HGBA1C 5.0 2019         ASSESSMENT & PLAN:     HIGH RISK CONDITION(S):        Recent admission for Hypertensive emergency:   SBP > 200 on admit  *s/p admit to MICU on [facility] gtt; no change in home regimen; concern for non compliance and adherence to prescribed therapy  *Goal:  < 130/90  Today: 134/90  ` continue Norvasc 10  ` continue Coreg   ` continue Lisinopril 40  ` continue Hygroten 25        Recent admission for an ARUNA in the setting of CKD III:   (Resolved at discharge)  *Baseline: 1.6-1.7  : discharge: 1.9  ` check BMP today to surveillance stability of renal fxn        DM (II):   Most recent A1c: 5.0% (10/2018)  [place]; (fastins consistently)   *Insulin stopped before discharge; advised to keep a log and will not resume unless 2 or more consecutive readings > 140   ` Glargine 10 (held with parameters); she understands that she needs to keep checking her Blood sugars, before meals, and bring log to all clinic apts. She understands that if her renal fxn should continue to improve, she need to be commenced back on insulin therapy. For now, she is having hypoglycemia events, and with an A1c of 5%, it is more than reasonable to stop the insulin and monitor closely.   ` check A1c today (last was in 10/2018 at 5%)       Hypothyroidism:   Lab Results   Component Value Date    TSH 2.892 10/18/2018   ` continue current dose of thyroid hormone replacement       Urinary Frequency:   ` check UA and culture (eval for infectious process)      Immunizations:   Flu Vaccine today          Medication List     "       Accurate as of 1/31/19 12:49 PM. If you have any questions, ask your nurse or doctor.               CONTINUE taking these medications    amLODIPine 10 MG tablet  Commonly known as:  NORVASC  Take 1 tablet (10 mg total) by mouth once daily.     atorvastatin 10 MG tablet  Commonly known as:  LIPITOR     blood sugar diagnostic Strp  To check BG 1 times daily, to use with insurance preferred meter     carvedilol 25 MG tablet  Commonly known as:  COREG     chlorthalidone 25 MG Tab  Commonly known as:  HYGROTEN  Take 1 tablet (25 mg total) by mouth once daily.     escitalopram oxalate 10 MG tablet  Commonly known as:  LEXAPRO     gabapentin 100 MG capsule  Commonly known as:  NEURONTIN  Take 1 capsule (100 mg total) by mouth 3 (three) times daily.     lancets Misc  To check BG 1 times daily, to use with insurance preferred meter     lisinopril 40 MG tablet  Commonly known as:  PRINIVIL,ZESTRIL     pantoprazole 40 MG tablet  Commonly known as:  PROTONIX  Take 1 tablet (40 mg total) by mouth once daily.     pen needle, diabetic 32 gauge x 5/32" Ndle  Commonly known as:  BD ULTRA-FINE OLGA LIDIA PEN NEEDLE  To use with Lantus solostar insulin pens     silver sulfADIAZINE 1% 1 % cream  Commonly known as:  SILVADENE     SYNTHROID 200 MCG tablet  Generic drug:  levothyroxine  Take 1 tablet (200 mcg total) by mouth once daily. BRAND NAME ONLY     traZODone 50 MG tablet  Commonly known as:  DESYREL        STOP taking these medications    insulin glargine 100 units/mL (3mL) SubQ pen  Commonly known as:  BASAGLAR KWIKPEN U-100 INSULIN  Stopped by:  WHITNEY Jaquez              Signing Physician:  WHITNEY Jaquez  "

## 2023-09-15 ENCOUNTER — COMMITTEE REVIEW (OUTPATIENT)
Dept: TRANSPLANT | Facility: CLINIC | Age: 63
End: 2023-09-15
Payer: MEDICARE

## 2023-09-15 NOTE — COMMITTEE REVIEW
Native Organ Dx: Diabetes Mellitus - Type II      SELECTION COMMITTEE NOTE    Kathie Quezada was presented at selection committee on 09/15/2023. Patient met selection criteria for kidney transplant related to ESRD due to Diabetes Mellitus - Type II.  No absolute contraindications to transplant at this time. Patient will be placed on the cadaveric wait list pending mammogram and final financial approval from insurance company. Patient will return to clinic for routine appointment in 1 year(s). Patient does not meet criteria for High KDPI kidney offer. Patient does meet HCV+ donor offer. Patient does not meet criteria for dual/enbloc, due to weight.     Planned immunosuppression Thymo induction.    Called patient to inform her of decision made by committee, she verbalized understanding. She stated she has possible donors; I provided her with Staci Lee's contact information.       Note written by Yaw May RN.     ===============================================    I was present at the meeting and attest to the decision of the committee.    Luis Miguel Alonso  09/15/2023

## 2023-09-15 NOTE — LETTER
September 15, 2023    Dr. Fred Kelly  [address] [facility] [address]  Phone: [phone]  Fax: [phone]     Dear Dr. Fred Kelly    Patient: Kathie Quezada   MR Number: [id number]   YOB: 1960     Your patient, Kathie Quezada, was recently discussed at the [facility] Selection Committee.  I am happy to inform you that Kathie has been approved for transplantation pending mammogram. She has met selection criteria for a kidney transplant related to ESRD secondary to primary diagnosis of Diabetes Mellitus - Type II. Your patient will be placed on the cadaveric wait list pending final financial approval from insurance company.     We appreciate your confidence in allowing us to participate in your patients care.      If you have any questions or concerns, please do not hesitate to contact me.    Sincerely,      Lora Morin MD  Medical Director, Kidney & Kidney/Pancreas Transplantation  jr  Cc: Kathie Quezada (Patient)         STEVE MCCORD

## 2023-09-20 ENCOUNTER — OFFICE VISIT (OUTPATIENT)
Dept: VASCULAR SURGERY | Facility: CLINIC | Age: 63
End: 2023-09-20
Payer: MEDICARE

## 2023-09-20 ENCOUNTER — HOSPITAL ENCOUNTER (OUTPATIENT)
Dept: VASCULAR SURGERY | Facility: CLINIC | Age: 63
Discharge: HOME | End: 2023-09-20
Attending: SURGERY
Payer: MEDICARE

## 2023-09-20 VITALS
BODY MASS INDEX: 37.72 KG/M2 | WEIGHT: 240.31 LBS | HEIGHT: 67 IN | HEART RATE: 77 BPM | TEMPERATURE: 98 F | SYSTOLIC BLOOD PRESSURE: 178 MMHG | DIASTOLIC BLOOD PRESSURE: 84 MMHG

## 2023-09-20 DIAGNOSIS — Z99.2 ESRD (END STAGE RENAL DISEASE) ON DIALYSIS: Primary | ICD-10-CM

## 2023-09-20 DIAGNOSIS — N18.6 ESRD (END STAGE RENAL DISEASE) ON DIALYSIS: Primary | ICD-10-CM

## 2023-09-20 DIAGNOSIS — T82.898D ARTERIOVENOUS FISTULA OCCLUSION, SUBSEQUENT ENCOUNTER: ICD-10-CM

## 2023-09-20 PROCEDURE — 3066F PR DOCUMENTATION OF TREATMENT FOR NEPHROPATHY: ICD-10-PCS | Mod: HCNC,CPTII,NTX,S$GLB | Performed by: SURGERY

## 2023-09-20 PROCEDURE — 3008F BODY MASS INDEX DOCD: CPT | Mod: HCNC,CPTII,NTX,S$GLB | Performed by: SURGERY

## 2023-09-20 PROCEDURE — 99999 PR PBB SHADOW E&M-EST. PATIENT-LVL III: ICD-10-PCS | Mod: PBBFAC,HCNC,TXP, | Performed by: SURGERY

## 2023-09-20 PROCEDURE — 99214 OFFICE O/P EST MOD 30 MIN: CPT | Mod: HCNC,NTX,S$GLB, | Performed by: SURGERY

## 2023-09-20 PROCEDURE — 3066F NEPHROPATHY DOC TX: CPT | Mod: HCNC,CPTII,NTX,S$GLB | Performed by: SURGERY

## 2023-09-20 PROCEDURE — 3079F PR MOST RECENT DIASTOLIC BLOOD PRESSURE 80-89 MM HG: ICD-10-PCS | Mod: HCNC,CPTII,NTX,S$GLB | Performed by: SURGERY

## 2023-09-20 PROCEDURE — 3044F HG A1C LEVEL LT 7.0%: CPT | Mod: HCNC,CPTII,NTX,S$GLB | Performed by: SURGERY

## 2023-09-20 PROCEDURE — 1159F PR MEDICATION LIST DOCUMENTED IN MEDICAL RECORD: ICD-10-PCS | Mod: HCNC,CPTII,NTX,S$GLB | Performed by: SURGERY

## 2023-09-20 PROCEDURE — 99214 PR OFFICE/OUTPT VISIT, EST, LEVL IV, 30-39 MIN: ICD-10-PCS | Mod: HCNC,NTX,S$GLB, | Performed by: SURGERY

## 2023-09-20 PROCEDURE — 3077F PR MOST RECENT SYSTOLIC BLOOD PRESSURE >= 140 MM HG: ICD-10-PCS | Mod: HCNC,CPTII,NTX,S$GLB | Performed by: SURGERY

## 2023-09-20 PROCEDURE — 3008F PR BODY MASS INDEX (BMI) DOCUMENTED: ICD-10-PCS | Mod: HCNC,CPTII,NTX,S$GLB | Performed by: SURGERY

## 2023-09-20 PROCEDURE — 93990 DOPPLER FLOW TESTING: CPT | Mod: HCNC,S$GLB,TXP, | Performed by: SURGERY

## 2023-09-20 PROCEDURE — 3077F SYST BP >= 140 MM HG: CPT | Mod: HCNC,CPTII,NTX,S$GLB | Performed by: SURGERY

## 2023-09-20 PROCEDURE — 3079F DIAST BP 80-89 MM HG: CPT | Mod: HCNC,CPTII,NTX,S$GLB | Performed by: SURGERY

## 2023-09-20 PROCEDURE — 1159F MED LIST DOCD IN RCRD: CPT | Mod: HCNC,CPTII,NTX,S$GLB | Performed by: SURGERY

## 2023-09-20 PROCEDURE — 93990 PR DUPLEX HEMODIALYSIS ACCESS: ICD-10-PCS | Mod: HCNC,S$GLB,TXP, | Performed by: SURGERY

## 2023-09-20 PROCEDURE — 99999 PR PBB SHADOW E&M-EST. PATIENT-LVL III: CPT | Mod: PBBFAC,HCNC,TXP, | Performed by: SURGERY

## 2023-09-20 PROCEDURE — 3044F PR MOST RECENT HEMOGLOBIN A1C LEVEL <7.0%: ICD-10-PCS | Mod: HCNC,CPTII,NTX,S$GLB | Performed by: SURGERY

## 2023-09-20 NOTE — PROGRESS NOTES
Kathie Quezada  09/20/2023    HPI:  Patient is a 63 y.o. female with a h/o DMII with retinopathy, HTN, CVA, Factor 11 deficiency, and multiple AKIS the last was from urinary retention who is here today for f/u.  Still not yet begun on HD  She is R handed with an obese upper arm.  Pt is unsure of timeframe for needing dialysis.  Last GFR 13.9, Cr 3.6.  Hypertensive today to 180s SBP.     S/p   8/15/23  L TRA, L BVT AVF fistulagram  PTA proximal stenoses x2 with 6x40mm Mehran balloon; outflow stenosis with 6x40mm Mehran balloon     Findings/Key Components:  Successful treatment of proximal stenosis > 75%  Good palpable thrill    3/23/23  L TRA, L BVT AVF - PTA proximal diffuse stenoses with 6x80mm DCB (Lutonix) balloon    1/31/23  Transposed L BVT,. 2nd stage  Findings: 2nd stage L BVT AVF done; dilated to 5-6 mm with strong thrill throughout course.     12/16/22  L TRA, PTA proximal (diffuse) stenoses x3 with 6x60mm Sterlin balloon     10/27/22  Creation of 1st stage L BVT AVF     Findings/Key Components:  L basilic vein dilated to 3-4mm; segment of 8-10cm was mobilized at a/c fossa  Strong thrill  Triphasic L ulnar doppler signal with minimal augmentation with AVF compression  Obese upper arm (BMI 38)  Consider transposition in 3-6months+     No hx of MI  Hx of CHF, EF 65%%, enlarged RA   Stroke ~3 years ago, no residual deficits   Tobacco use: never     Current Outpatient Medications:     calcium acetate,phosphat bind, (PHOSLO) 667 mg capsule, Take 1 capsule (667 mg total) by mouth 3 (three) times daily with meals., Disp: 90 capsule, Rfl: 2    carvediloL (COREG) 25 MG tablet, Take 1 tablet (25 mg total) by mouth 2 (two) times daily with meals., Disp: 180 tablet, Rfl: 3    cholecalciferol, vitamin D3, 125 mcg (5,000 unit) Tab, Take 1 tablet (5,000 Units total) by mouth once daily., Disp: 90 tablet, Rfl: 3    EScitalopram oxalate (LEXAPRO) 10 MG tablet, Take 1 tablet (10 mg total) by mouth once daily.  (Patient taking differently: Take 10 mg by mouth every evening.), Disp: 90 tablet, Rfl: 3    ferrous gluconate (FERGON) 324 MG tablet, Take 1 tablet (324 mg total) by mouth daily with breakfast., Disp: 90 tablet, Rfl: 1    furosemide (LASIX) 40 MG tablet, Take 2 tablets (80 mg total) by mouth 2 (two) times a day., Disp: 120 tablet, Rfl: 3    levocetirizine (XYZAL) 5 MG tablet, Take 1 tablet (5 mg total) by mouth daily as needed for Allergies., Disp: 90 tablet, Rfl: 3    NIFEdipine (PROCARDIA-XL) 30 MG (OSM) 24 hr tablet, Take 1 tablet (30 mg total) by mouth 2 (two) times a day., Disp: 60 tablet, Rfl: 2    OPW TEST CLAIM - DO NOT FILL, OPW test claim. Do not fill., Disp: 1 tablet, Rfl: 0    SYNTHROID 200 mcg tablet, Take 1 tablet (200 mcg total) by mouth before breakfast., Disp: 90 tablet, Rfl: 3    traZODone (DESYREL) 50 MG tablet, Take 1 to 2 tablets ( mg total) by mouth nightly as needed for Insomnia., Disp: 120 tablet, Rfl: 3    ondansetron (ZOFRAN-ODT) 4 MG TbDL, Take 1 tablet (4 mg total) by mouth every 12 (twelve) hours as needed (nausea)., Disp: 30 tablet, Rfl: 0    PHYSICAL EXAM:   Right Arm BP - Sittin/84 (23 1022)  Pulse: 77  Temp: 97.8 °F (36.6 °C)                 Neck: Supple, no significant adenopathy; thyroid is not enlarged          Abdomen: Soft, nontender, nondistended, no masses or organomegaly     No rebound tenderness noted; bowel sounds normal     No groin adenopathy      Extremities:   3 cm scar overlying L antecubital fossa      Significant obesity upper arm      Proximal L BVT AVF good thrill         LAB RESULTS:  Lab Results   Component Value Date    K 3.9 08/10/2023    K 4.2 2023    K 4.5 2023    CREATININE 4.2 (H) 08/10/2023    CREATININE 4.5 (H) 2023    CREATININE 4.3 (H) 2023     Lab Results   Component Value Date    WBC 11.82 08/10/2023    WBC 9.14 2023    WBC 8.73 2023    HCT 33.6 (L) 08/10/2023    HCT 28.7 (L) 2023    HCT  32.4 (L) 2023     08/10/2023     (L) 2023     (L) 2023     Lab Results   Component Value Date    HGBA1C 5.3 2023    HGBA1C 5.2 2023    HGBA1C 5.5 2022     IMAGIN2023   AVF u/s: flow vol 1360 (prior 986) ml/min; PSVs elevated x2  Diam 4 - 7  Depth 13 - 31mm    Prior:  U/S: flow vol 1090 ml/min   cm/s proximally and 339 cm/ds in outflow  Diam 7-8mm  Depth 7.9 - 14mm    AVF u/s: flow vol 1,326 ml/min  PSVs 688 and 597 cm/s proximally -- 'long segment narrowing beyond anastomosis'    AVF u/s: flow vol 1127 ml/min  PSVs 706 cm/s proximally -- 'long segment narrowing beyond anastomosis'    IMP/PLAN:     63 y.o. female with a h/o DMII with retinopathy, HTN, stroke and obesity (BMI 40), and multiple AKIs the last was from urinary retention -- s/p L BVT AVF (2-stages) and prior PTA diffuse proximal stenosis -- recurrent proximal single lesion  Obese L upper arm  Now s/p x2 L TRA  and extensive PTA - good thrill  Despite elevated PSVs - good thrill and caliber > 5 - 6 mm  In future, if still cannot be used for HD, will need LUE AVG with AVF ligation in future  Note given to begin to use for HD and f/u in 3+ months for possible permacath removal. A challenge will be the significant L upper arm obesity for access     Tirso Tavera MD DFSVS FACS   Vascular/Endovascular Surgery

## 2023-09-23 ENCOUNTER — IMMUNIZATION (OUTPATIENT)
Dept: PRIMARY CARE CLINIC | Facility: CLINIC | Age: 63
End: 2023-09-23
Payer: MEDICARE

## 2023-09-23 PROCEDURE — 90686 FLU VACCINE (QUAD) GREATER THAN OR EQUAL TO 3YO PRESERVATIVE FREE IM: ICD-10-PCS | Mod: HCNC,NTX,S$GLB, | Performed by: FAMILY MEDICINE

## 2023-09-23 PROCEDURE — G0008 FLU VACCINE (QUAD) GREATER THAN OR EQUAL TO 3YO PRESERVATIVE FREE IM: ICD-10-PCS | Mod: HCNC,NTX,S$GLB, | Performed by: FAMILY MEDICINE

## 2023-09-23 PROCEDURE — 90686 IIV4 VACC NO PRSV 0.5 ML IM: CPT | Mod: HCNC,NTX,S$GLB, | Performed by: FAMILY MEDICINE

## 2023-09-23 PROCEDURE — G0008 ADMIN INFLUENZA VIRUS VAC: HCPCS | Mod: HCNC,NTX,S$GLB, | Performed by: FAMILY MEDICINE

## 2023-09-26 ENCOUNTER — OFFICE VISIT (OUTPATIENT)
Dept: SURGERY | Facility: CLINIC | Age: 63
End: 2023-09-26
Payer: MEDICARE

## 2023-09-26 DIAGNOSIS — N18.5 CHRONIC KIDNEY DISEASE (CKD), STAGE V: Primary | Chronic | ICD-10-CM

## 2023-09-26 PROCEDURE — 1159F MED LIST DOCD IN RCRD: CPT | Mod: HCNC,CPTII,95,NTX | Performed by: SURGERY

## 2023-09-26 PROCEDURE — 3066F PR DOCUMENTATION OF TREATMENT FOR NEPHROPATHY: ICD-10-PCS | Mod: HCNC,CPTII,95,NTX | Performed by: SURGERY

## 2023-09-26 PROCEDURE — 3066F NEPHROPATHY DOC TX: CPT | Mod: HCNC,CPTII,95,NTX | Performed by: SURGERY

## 2023-09-26 PROCEDURE — 1160F PR REVIEW ALL MEDS BY PRESCRIBER/CLIN PHARMACIST DOCUMENTED: ICD-10-PCS | Mod: HCNC,CPTII,95,NTX | Performed by: SURGERY

## 2023-09-26 PROCEDURE — 1160F RVW MEDS BY RX/DR IN RCRD: CPT | Mod: HCNC,CPTII,95,NTX | Performed by: SURGERY

## 2023-09-26 PROCEDURE — 3044F PR MOST RECENT HEMOGLOBIN A1C LEVEL <7.0%: ICD-10-PCS | Mod: HCNC,CPTII,95,NTX | Performed by: SURGERY

## 2023-09-26 PROCEDURE — 99213 OFFICE O/P EST LOW 20 MIN: CPT | Mod: HCNC,95,NTX, | Performed by: SURGERY

## 2023-09-26 PROCEDURE — 1159F PR MEDICATION LIST DOCUMENTED IN MEDICAL RECORD: ICD-10-PCS | Mod: HCNC,CPTII,95,NTX | Performed by: SURGERY

## 2023-09-26 PROCEDURE — 99213 PR OFFICE/OUTPT VISIT, EST, LEVL III, 20-29 MIN: ICD-10-PCS | Mod: HCNC,95,NTX, | Performed by: SURGERY

## 2023-09-26 PROCEDURE — 3044F HG A1C LEVEL LT 7.0%: CPT | Mod: HCNC,CPTII,95,NTX | Performed by: SURGERY

## 2023-09-26 NOTE — PROGRESS NOTES
The patient location is: home  The chief complaint leading to consultation is: esrd    Visit type: audiovisual      19 minutes of total time spent on the encounter, which includes face to face time and non-face to face time preparing to see the patient (eg, review of tests), Obtaining and/or reviewing separately obtained history, Documenting clinical information in the electronic or other health record, Independently interpreting results (not separately reported) and communicating results to the patient/family/caregiver, or Care coordination (not separately reported).         Each patient to whom he or she provides medical services by telemedicine is:  (1) informed of the relationship between the physician and patient and the respective role of any other health care provider with respect to management of the patient; and (2) notified that he or she may decline to receive medical services by telemedicine and may withdraw from such care at any time.    Notes:     History & Physical    SUBJECTIVE:     History of Present Illness:  Patient is a 63 y.o. female presents with bmi 37 (recorded in epic), s/p cva (htn and dm, resolved), dm2 not on insulin, hld, factor 9 deficiency (ffp for surgery), essential htn with eye changes, cad s/p chf, and dm esrd.  S/p fistula which didn't work and is undergoing hemo through parul catheter.  She is right handed, wears her belt line a little above the navel, and has had aurora/hyst/ooph.  She has not been evaluated by the pd nurses.    She is a .    No chief complaint on file.      Review of patient's allergies indicates:   Allergen Reactions    Atorvastatin Other (See Comments)     Statin induced myalgia    Mushroom Hives    Peanut Anaphylaxis    Bactrim [sulfamethoxazole-trimethoprim] Hives    Gabapentin      Pruritis     Iodine     Peanut butter flavor     Penicillins Hives    Shellfish containing products Itching    Sulfa (sulfonamide antibiotics) Hives       Current  Outpatient Medications   Medication Sig Dispense Refill    calcium acetate,phosphat bind, (PHOSLO) 667 mg capsule Take 1 capsule (667 mg total) by mouth 3 (three) times daily with meals. 90 capsule 2    carvediloL (COREG) 25 MG tablet Take 1 tablet (25 mg total) by mouth 2 (two) times daily with meals. 180 tablet 3    cholecalciferol, vitamin D3, 125 mcg (5,000 unit) Tab Take 1 tablet (5,000 Units total) by mouth once daily. 90 tablet 3    EScitalopram oxalate (LEXAPRO) 10 MG tablet Take 1 tablet (10 mg total) by mouth once daily. (Patient taking differently: Take 10 mg by mouth every evening.) 90 tablet 3    ferrous gluconate (FERGON) 324 MG tablet Take 1 tablet (324 mg total) by mouth daily with breakfast. 90 tablet 1    furosemide (LASIX) 40 MG tablet Take 2 tablets (80 mg total) by mouth 2 (two) times a day. 120 tablet 3    levocetirizine (XYZAL) 5 MG tablet Take 1 tablet (5 mg total) by mouth daily as needed for Allergies. 90 tablet 3    NIFEdipine (PROCARDIA-XL) 30 MG (OSM) 24 hr tablet Take 1 tablet (30 mg total) by mouth 2 (two) times a day. 60 tablet 2    ondansetron (ZOFRAN-ODT) 4 MG TbDL Take 1 tablet (4 mg total) by mouth every 12 (twelve) hours as needed (nausea). 30 tablet 0    OPW TEST CLAIM - DO NOT FILL OPW test claim. Do not fill. 1 tablet 0    SYNTHROID 200 mcg tablet Take 1 tablet (200 mcg total) by mouth before breakfast. 90 tablet 3    traZODone (DESYREL) 50 MG tablet Take 1 to 2 tablets ( mg total) by mouth nightly as needed for Insomnia. 120 tablet 3     No current facility-administered medications for this visit.       Past Medical History:   Diagnosis Date    Acute respiratory failure with hypoxia and hypercapnia 05/26/2019    ATN (acute tubular necrosis) 02/08/2019    Chronic diastolic heart failure 02/07/2019    2-8-19 Mild left atrial enlargement. Mild left ventricular enlargement. Normal left ventricular systolic function. The estimated ejection fraction is 55% Indeterminate left  ventricular diastolic function. Normal right ventricular systolic function. Mild mitral regurgitation. Mild to moderate tricuspid regurgitation. The estimated PA systolic pressure is 32 mm Hg Normal central venous pressure (3 m    CKD (chronic kidney disease) stage 4, GFR 15-29 ml/min 05/16/2018    CKD stage G3a/A3, GFR 45-59 and albumin creatinine ratio >300 mg/g 05/16/2018    CKD stage G3b/A3, GFR 30-44 and albumin creatinine ratio >300 mg/g 05/16/2018    Closed compression fracture of L2 lumbar vertebra 05/24/2019    Closed compression fracture of L2 lumbar vertebra 05/24/2019     IMO Regulatory Load October 2019    Controlled type 2 diabetes with retinopathy 04/09/2018    COVID-19 virus infection 7-1-2021 07/01/2021    CVA (cerebral vascular accident) 2017    Diabetic polyneuropathy associated with type 2 diabetes mellitus 12/04/2020    Encounter for blood transfusion     Essential hypertension 04/09/2018    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  7-31-19 Factor XI Activity 55 - 145 % 96       Gastroesophageal reflux disease without esophagitis 05/24/2019    Generalized anxiety disorder 11/08/2019    GERD (gastroesophageal reflux disease)     GIB (gastrointestinal bleeding) 10/18/2018    History of CVA (cerebrovascular accident) without residual deficits 11/27/2022    History of hepatitis C     s/p succesful Rx w/ Harvoni  - SVR12 (cure) 2016    Hypertensive retinopathy, bilateral 12/10/2019    Mild nonproliferative diabetic retinopathy 05/02/2018    Mixed hyperlipidemia 04/09/2018    Myalgia due to statin 09/07/2022    Normocytic anemia 01/14/2022    NS (nuclear sclerosis), bilateral 12/10/2019    Peanut allergy 02/04/2023    PNA (pneumonia) 05/28/2019    Postoperative hypothyroidism     Renovascular hypertension 04/09/2018    S/P kyphoplasty 08/14/2019 8/14/19: L2 kyphoplasty with Dr. Kwon     Slow transit constipation 05/27/2019    Type 2 diabetes mellitus with circulatory disorder,  with long-term current use of insulin 04/09/2018    Type 2 diabetes mellitus with stage 3 chronic kidney disease, without long-term current use of insulin 04/09/2018    Type 2 diabetes mellitus with stage 3a chronic kidney disease, without long-term current use of insulin 03/03/2021    Type 2 diabetes mellitus with stage 3b chronic kidney disease, without long-term current use of insulin 09/07/2022     Past Surgical History:   Procedure Laterality Date    AV FISTULA PLACEMENT Left 10/27/2022    Procedure: CREATION, AV FISTULA;  Surgeon: Tirso Tavera MD;  Location: [facility] OR 2ND FLR;  Service: Peripheral Vascular;  Laterality: Left;  Brachio basilic 1st stage     BACK SURGERY  2017    CHOLECYSTECTOMY  2015    [facility]    COLONOSCOPY  11/13/2015    Dr Chino torres prep. internal hemorrhoids. diverticulosis of sigmois. NO polyps. repeat due 2025    FISTULOGRAM N/A 12/16/2022    Procedure: Fistulogram;  Surgeon: Tirso Tavera MD;  Location: [facility] CATH LAB;  Service: Peripheral Vascular;  Laterality: N/A;    FISTULOGRAM, WITH PTA Left 3/23/2023    Procedure: FISTULOGRAM, WITH PTA;  Surgeon: Tirso Tavera MD;  Location: [facility] OR [facility] FLR;  Service: Peripheral Vascular;  Laterality: Left;  LUE TRANSRADIAL CO2 FISTULOGRAM    1.9 min  22.99 mGy  4.3032 Gy.cm  200cc CO2  9ml TransRadial Solution    FISTULOGRAM, WITH PTA Left 8/15/2023    Procedure: FISTULOGRAM, WITH PTA TRANSRADIAL;  Surgeon: Tirso Tavera MD;  Location: [facility] OR [facility] FLR;  Service: Peripheral Vascular;  Laterality: Left;  Fluoro: 5.1 min, mGy: 7.95, Gycm2: 1.2626, contrast: 12ml    FIXATION KYPHOPLASTY Bilateral 08/14/2019    Procedure: L2 kyphoplasty Fluoro Globus;  Surgeon: Jeremie Kwon DO;  Location: [facility] OR;  Service: Neurosurgery;  Laterality: Bilateral;  GLOBUS MACRINA WINSLOW [phone] TEXTED HER @ 10:54AM ON 7-30-19  PRE-OP-BY RN  8-7-2019    HYSTERECTOMY      OOPHORECTOMY  1996    one    PERCUTANEOUS TRANSLUMINAL ANGIOPLASTY OF  ARTERIOVENOUS FISTULA Left 12/16/2022    Procedure: PTA, AV FISTULA;  Surgeon: Tirso Tavera MD;  Location: [facility] CATH LAB;  Service: Peripheral Vascular;  Laterality: Left;    RIGHT HEART CATHETERIZATION Right 6/15/2023    Procedure: INSERTION, CATHETER, RIGHT HEART;  Surgeon: Lucretia Rivas MD;  Location: [facility] CATH LAB;  Service: Cardiology;  Laterality: Right;    THYROIDECTOMY  2016    at [facility] for thyroid nodule    TONSILLECTOMY      TRANSPOSITION OF BASILIC VEIN Left 1/31/2023    Procedure: TRANSPOSITION, VEIN, BASILIC;  Surgeon: Tirso Tavera MD;  Location: [facility] OR [address] [place] [address];  Service: Peripheral Vascular;  Laterality: Left;     Family History   Problem Relation Age of Onset    Stroke Mother     Heart disease Mother     Hypertension Mother     Diabetes type II Mother     Coronary artery disease Mother     Hypertension Father     Prostate cancer Father     Aneurysm Brother      Social History     Tobacco Use    Smoking status: Never     Passive exposure: Past    Smokeless tobacco: Never   Substance Use Topics    Alcohol use: Not Currently    Drug use: No        Review of Systems:  Review of Systems    OBJECTIVE:     Vital Signs (Most Recent)              Physical Exam:  Physical Exam    Laboratory  CBC: Reviewed  CMP: Reviewed  Shows renal failure    Diagnostic Results:  Pet stress reviewed  Ct chest reviewed      ASSESSMENT/PLAN:     Esrd on hemo and wanting pd    PLAN:       Will work on PD nurse evaluation.  Once that is ok we can go ahead with pd placement.  She will need cardiology clearance and hematology clearance with ffp for the procedure

## 2023-09-26 NOTE — LETTER
September 26, 2023        Elan Price MD  [address]  [facility] [address]             [facility]  [address]  [place] [address]  Phone: [phone]   Patient: Kathie Quezada   MR Number: [id number]   YOB: 1960   Date of Visit: 9/26/2023       Dear Dr. Price:    Thank you for referring Kathie Quezada to me for evaluation. Attached you will find relevant portions of my assessment and plan of care.    If you have questions, please do not hesitate to call me. I look forward to following Kathie Quezada along with you.    Sincerely,      Akbar Cardenas MD            CC    No Recipients    Enclosure

## 2023-09-26 NOTE — LETTER
September 26, 2023        Fred Kelly MD  [address]  [facility] [address]             [facility]  [address]  [facility] [address]  Phone: [phone]   Patient: Kathie Quezada   MR Number: [id number]   YOB: 1960   Date of Visit: 9/26/2023       Dear Dr. Kelly:    Thank you for referring Kathie Quezada to me for evaluation. Attached you will find relevant portions of my assessment and plan of care.    If you have questions, please do not hesitate to call me. I look forward to following Kathie Quezada along with you.    Sincerely,      Akbar Cardenas MD            CC    No Recipients    Enclosure

## 2023-10-02 ENCOUNTER — HOSPITAL ENCOUNTER (OUTPATIENT)
Dept: RADIOLOGY | Facility: HOSPITAL | Age: 63
Discharge: HOME | End: 2023-10-02
Attending: INTERNAL MEDICINE
Payer: MEDICARE

## 2023-10-02 VITALS — BODY MASS INDEX: 36.81 KG/M2 | WEIGHT: 235 LBS

## 2023-10-02 DIAGNOSIS — Z12.31 ENCOUNTER FOR SCREENING MAMMOGRAM FOR BREAST CANCER: ICD-10-CM

## 2023-10-02 PROCEDURE — 77063 MAMMO DIGITAL SCREENING BILAT WITH TOMO: ICD-10-PCS | Mod: 26,TXP,, | Performed by: RADIOLOGY

## 2023-10-02 PROCEDURE — 77067 MAMMO DIGITAL SCREENING BILAT WITH TOMO: ICD-10-PCS | Mod: 26,TXP,, | Performed by: RADIOLOGY

## 2023-10-02 PROCEDURE — 77063 BREAST TOMOSYNTHESIS BI: CPT | Mod: 26,TXP,, | Performed by: RADIOLOGY

## 2023-10-02 PROCEDURE — 77067 SCR MAMMO BI INCL CAD: CPT | Mod: 26,TXP,, | Performed by: RADIOLOGY

## 2023-10-02 PROCEDURE — 77067 SCR MAMMO BI INCL CAD: CPT | Mod: TC,HCNC,NTX

## 2023-10-06 ENCOUNTER — TELEPHONE (OUTPATIENT)
Dept: VASCULAR SURGERY | Facility: CLINIC | Age: 63
End: 2023-10-06
Payer: MEDICARE

## 2023-10-06 DIAGNOSIS — T82.898D ARTERIOVENOUS FISTULA OCCLUSION, SUBSEQUENT ENCOUNTER: Primary | ICD-10-CM

## 2023-10-06 DIAGNOSIS — N25.81 SECONDARY HYPERPARATHYROIDISM OF RENAL ORIGIN: ICD-10-CM

## 2023-10-06 RX ORDER — CALCIUM ACETATE 667 MG/1
667 CAPSULE ORAL
Qty: 90 CAPSULE | Refills: 2 | OUTPATIENT
Start: 2023-10-06 | End: 2024-10-05

## 2023-10-06 NOTE — TELEPHONE ENCOUNTER
Spoke with Cynthia and appt scheduled.  ----- Message from Farheen Erickson sent at 10/6/2023  3:07 PM CDT -----  Regarding: appt access  Contact: Cynthia, [phone]  PATIENT CALL    Cynthia from [facility] is calling to speak with someone in provider's office regarding scheduling a f/u. She's asking for an assessment from the provider bc they've been having trouble cannulating her and she needs to be seen sooner than January. Please call her at [phone].

## 2023-10-10 DIAGNOSIS — N25.81 SECONDARY HYPERPARATHYROIDISM OF RENAL ORIGIN: ICD-10-CM

## 2023-10-11 ENCOUNTER — HOSPITAL ENCOUNTER (OUTPATIENT)
Dept: VASCULAR SURGERY | Facility: CLINIC | Age: 63
Discharge: HOME | End: 2023-10-11
Attending: SURGERY
Payer: MEDICARE

## 2023-10-11 ENCOUNTER — OFFICE VISIT (OUTPATIENT)
Dept: VASCULAR SURGERY | Facility: CLINIC | Age: 63
End: 2023-10-11
Payer: MEDICARE

## 2023-10-11 VITALS
BODY MASS INDEX: 37.37 KG/M2 | SYSTOLIC BLOOD PRESSURE: 170 MMHG | WEIGHT: 238.13 LBS | HEIGHT: 67 IN | DIASTOLIC BLOOD PRESSURE: 79 MMHG | TEMPERATURE: 98 F | HEART RATE: 75 BPM

## 2023-10-11 DIAGNOSIS — Z99.2 ESRD ON DIALYSIS: Primary | ICD-10-CM

## 2023-10-11 DIAGNOSIS — N18.6 ESRD (END STAGE RENAL DISEASE): Primary | ICD-10-CM

## 2023-10-11 DIAGNOSIS — T82.898D ARTERIOVENOUS FISTULA OCCLUSION, SUBSEQUENT ENCOUNTER: ICD-10-CM

## 2023-10-11 DIAGNOSIS — N18.6 ESRD ON DIALYSIS: Primary | ICD-10-CM

## 2023-10-11 PROBLEM — T82.898A ARTERIOVENOUS FISTULA OCCLUSION: Status: ACTIVE | Noted: 2023-10-11

## 2023-10-11 PROCEDURE — 1159F MED LIST DOCD IN RCRD: CPT | Mod: HCNC,CPTII,NTX,S$GLB | Performed by: SURGERY

## 2023-10-11 PROCEDURE — 3008F BODY MASS INDEX DOCD: CPT | Mod: HCNC,CPTII,NTX,S$GLB | Performed by: SURGERY

## 2023-10-11 PROCEDURE — 3077F PR MOST RECENT SYSTOLIC BLOOD PRESSURE >= 140 MM HG: ICD-10-PCS | Mod: HCNC,CPTII,NTX,S$GLB | Performed by: SURGERY

## 2023-10-11 PROCEDURE — 99214 PR OFFICE/OUTPT VISIT, EST, LEVL IV, 30-39 MIN: ICD-10-PCS | Mod: HCNC,NTX,S$GLB, | Performed by: SURGERY

## 2023-10-11 PROCEDURE — 93990 PR DUPLEX HEMODIALYSIS ACCESS: ICD-10-PCS | Mod: HCNC,S$GLB,TXP, | Performed by: SURGERY

## 2023-10-11 PROCEDURE — 3078F PR MOST RECENT DIASTOLIC BLOOD PRESSURE < 80 MM HG: ICD-10-PCS | Mod: HCNC,CPTII,NTX,S$GLB | Performed by: SURGERY

## 2023-10-11 PROCEDURE — 99214 OFFICE O/P EST MOD 30 MIN: CPT | Mod: HCNC,NTX,S$GLB, | Performed by: SURGERY

## 2023-10-11 PROCEDURE — 3077F SYST BP >= 140 MM HG: CPT | Mod: HCNC,CPTII,NTX,S$GLB | Performed by: SURGERY

## 2023-10-11 PROCEDURE — 93990 DOPPLER FLOW TESTING: CPT | Mod: HCNC,S$GLB,TXP, | Performed by: SURGERY

## 2023-10-11 PROCEDURE — 3078F DIAST BP <80 MM HG: CPT | Mod: HCNC,CPTII,NTX,S$GLB | Performed by: SURGERY

## 2023-10-11 PROCEDURE — 3008F PR BODY MASS INDEX (BMI) DOCUMENTED: ICD-10-PCS | Mod: HCNC,CPTII,NTX,S$GLB | Performed by: SURGERY

## 2023-10-11 PROCEDURE — 3066F NEPHROPATHY DOC TX: CPT | Mod: HCNC,CPTII,NTX,S$GLB | Performed by: SURGERY

## 2023-10-11 PROCEDURE — 3066F PR DOCUMENTATION OF TREATMENT FOR NEPHROPATHY: ICD-10-PCS | Mod: HCNC,CPTII,NTX,S$GLB | Performed by: SURGERY

## 2023-10-11 PROCEDURE — 3044F HG A1C LEVEL LT 7.0%: CPT | Mod: HCNC,CPTII,NTX,S$GLB | Performed by: SURGERY

## 2023-10-11 PROCEDURE — 3044F PR MOST RECENT HEMOGLOBIN A1C LEVEL <7.0%: ICD-10-PCS | Mod: HCNC,CPTII,NTX,S$GLB | Performed by: SURGERY

## 2023-10-11 PROCEDURE — 99999 PR PBB SHADOW E&M-EST. PATIENT-LVL III: ICD-10-PCS | Mod: PBBFAC,HCNC,TXP, | Performed by: SURGERY

## 2023-10-11 PROCEDURE — 99999 PR PBB SHADOW E&M-EST. PATIENT-LVL III: CPT | Mod: PBBFAC,HCNC,TXP, | Performed by: SURGERY

## 2023-10-11 PROCEDURE — 1159F PR MEDICATION LIST DOCUMENTED IN MEDICAL RECORD: ICD-10-PCS | Mod: HCNC,CPTII,NTX,S$GLB | Performed by: SURGERY

## 2023-10-11 RX ORDER — CALCIUM ACETATE 667 MG/1
667 CAPSULE ORAL
Qty: 90 CAPSULE | Refills: 2 | OUTPATIENT
Start: 2023-10-11 | End: 2024-10-10

## 2023-10-11 NOTE — PROGRESS NOTES
Kathie Quezada  10/11/2023    HPI:  Patient is a 63 y.o. female with a h/o DMII with retinopathy, HTN, CVA, Factor 11 deficiency, and multiple AKIS the last was from urinary retention who is here today for f/u. She goes to HD on MWF, and they continue to use her HD cath. Reports numbness and tingling in her left hand.   She is R handed with an obese upper arm.  Pt is unsure of timeframe for needing dialysis.  Last GFR 11.5, Cr 3.9.  Hypertensive today to 170s SBP.     S/p   8/15/23  L TRA, L BVT AVF fistulagram  PTA proximal stenoses x2 with 6x40mm Mehran balloon; outflow stenosis with 6x40mm Mehran balloon     Findings/Key Components:  Successful treatment of proximal stenosis > 75%  Good palpable thrill    3/23/23  L TRA, L BVT AVF - PTA proximal diffuse stenoses with 6x80mm DCB (Lutonix) balloon    1/31/23  Transposed L BVT,. 2nd stage  Findings: 2nd stage L BVT AVF done; dilated to 5-6 mm with strong thrill throughout course.     12/16/22  L TRA, PTA proximal (diffuse) stenoses x3 with 6x60mm Sterlin balloon     10/27/22  Creation of 1st stage L BVT AVF     Findings/Key Components:  L basilic vein dilated to 3-4mm; segment of 8-10cm was mobilized at a/c fossa  Strong thrill  Triphasic L ulnar doppler signal with minimal augmentation with AVF compression  Obese upper arm (BMI 38)  Consider transposition in 3-6months+     No hx of MI  Hx of CHF, EF 65%%, enlarged RA   Stroke ~3 years ago, no residual deficits   Tobacco use: never     Current Outpatient Medications:     calcium acetate,phosphat bind, (PHOSLO) 667 mg capsule, Take 1 capsule (667 mg total) by mouth 3 (three) times daily with meals., Disp: 90 capsule, Rfl: 2    carvediloL (COREG) 25 MG tablet, Take 1 tablet (25 mg total) by mouth 2 (two) times daily with meals., Disp: 180 tablet, Rfl: 3    cholecalciferol, vitamin D3, 125 mcg (5,000 unit) Tab, Take 1 tablet (5,000 Units total) by mouth once daily., Disp: 90 tablet, Rfl: 3    EScitalopram  oxalate (LEXAPRO) 10 MG tablet, Take 1 tablet (10 mg total) by mouth once daily. (Patient taking differently: Take 10 mg by mouth every evening.), Disp: 90 tablet, Rfl: 3    ferrous gluconate (FERGON) 324 MG tablet, Take 1 tablet (324 mg total) by mouth daily with breakfast., Disp: 90 tablet, Rfl: 1    furosemide (LASIX) 40 MG tablet, Take 2 tablets (80 mg total) by mouth 2 (two) times a day., Disp: 120 tablet, Rfl: 3    levocetirizine (XYZAL) 5 MG tablet, Take 1 tablet (5 mg total) by mouth daily as needed for Allergies., Disp: 90 tablet, Rfl: 3    NIFEdipine (PROCARDIA-XL) 30 MG (OSM) 24 hr tablet, Take 1 tablet (30 mg total) by mouth 2 (two) times a day., Disp: 60 tablet, Rfl: 2    OPW TEST CLAIM - DO NOT FILL, OPW test claim. Do not fill., Disp: 1 tablet, Rfl: 0    SYNTHROID 200 mcg tablet, Take 1 tablet (200 mcg total) by mouth before breakfast., Disp: 90 tablet, Rfl: 3    traZODone (DESYREL) 50 MG tablet, Take 1 to 2 tablets ( mg total) by mouth nightly as needed for Insomnia., Disp: 120 tablet, Rfl: 3    PHYSICAL EXAM:   Left Arm BP - Sittin/79 (10/11/23 1107)  Pulse: 75  Temp: 98.2 °F (36.8 °C)                 Neck: Supple, no significant adenopathy; thyroid is not enlarged          Abdomen: Soft, nontender, nondistended, no masses or organomegaly     No rebound tenderness noted; bowel sounds normal     No groin adenopathy      Extremities:   3 cm scar overlying L antecubital fossa      Significant obesity upper arm      Proximal L BVT AVF good thrill no pulsatility          LAB RESULTS:  Lab Results   Component Value Date    K 3.9 08/10/2023    K 4.2 2023    K 4.5 2023    CREATININE 4.2 (H) 08/10/2023    CREATININE 4.5 (H) 2023    CREATININE 4.3 (H) 2023     Lab Results   Component Value Date    WBC 11.82 08/10/2023    WBC 9.14 2023    WBC 8.73 2023    HCT 33.6 (L) 08/10/2023    HCT 28.7 (L) 2023    HCT 32.4 (L) 2023     08/10/2023    PLT  108 (L) 07/26/2023     (L) 07/25/2023     Lab Results   Component Value Date    HGBA1C 5.3 08/01/2023    HGBA1C 5.2 02/02/2023    HGBA1C 5.5 11/17/2022     IMAGING:  10/11/2023   AVF u/s: flow 734 ml/min, PSVs elevated 850 cm/s distal to anastomosis  Diameter fistula proximal 4.8 mm  Diameter fistula mid 7.5 mm  Prior:  AVF u/s: flow vol 1360 (prior 986) ml/min; PSVs elevated x2  Diam 4 - 7  Depth 13 - 31mm    U/S: flow vol 1090 ml/min   cm/s proximally and 339 cm/ds in outflow  Diam 7-8mm  Depth 7.9 - 14mm    AVF u/s: flow vol 1,326 ml/min  PSVs 688 and 597 cm/s proximally -- 'long segment narrowing beyond anastomosis'    AVF u/s: flow vol 1127 ml/min  PSVs 706 cm/s proximally -- 'long segment narrowing beyond anastomosis'    IMP/PLAN:     63 y.o. female with a h/o DMII with retinopathy, HTN, stroke and obesity (BMI 40), and multiple AKIs the last was from urinary retention -- s/p L BVT AVF (2-stages) and prior PTA diffuse proximal stenosis -- recurrent proximal single lesion  Obese L upper arm  Now s/p x2 L TRA  and extensive PTA - good thrill  Despite elevated PSVs - good thrill and caliber > 5 - 6 mm    As before - good thrill no pulsatility in this obese L arm; will try to use again  In future, if still cannot be used for HD, can try once more L TRA approach and more aggressive PTA (6 and 7mm balloons); if still not functional for HD, will then need LUE AVG with AVF ligation in future  f/u in 4-6 weeks for possible permacath removal. A challenge will be the significant L upper arm obesity for access     Tirso Tavera MD DFSVS FACS   Vascular/Endovascular Surgery

## 2023-10-15 DIAGNOSIS — N25.81 SECONDARY HYPERPARATHYROIDISM OF RENAL ORIGIN: ICD-10-CM

## 2023-10-16 ENCOUNTER — PATIENT OUTREACH (OUTPATIENT)
Dept: ADMINISTRATIVE | Facility: OTHER | Age: 63
End: 2023-10-16
Payer: MEDICARE

## 2023-10-16 RX ORDER — CALCIUM ACETATE 667 MG/1
667 CAPSULE ORAL
Qty: 90 CAPSULE | Refills: 2 | OUTPATIENT
Start: 2023-10-16 | End: 2024-10-15

## 2023-10-16 NOTE — PROGRESS NOTES
CHW - Case Closure    This Community Health Worker spoke to patient via telephone today.   Pt/Caregiver reported: Pt stated she received 500.00 from [employer]  [employer] [facility] (Diagnosis of Pulmonary Hypertension required-NOT HIGH BLOOD PRESSURE!!) and is grateful for the assistance.  Pt/Caregiver denied any additional needs at this time and agrees with episode closure at this time.  Provided patient with Community Health Worker's contact information and encouraged him/her to contact this Community Health Worker if additional needs arise.

## 2023-10-18 ENCOUNTER — TELEPHONE (OUTPATIENT)
Dept: TRANSPLANT | Facility: CLINIC | Age: 63
End: 2023-10-18
Payer: MEDICARE

## 2023-10-18 ENCOUNTER — PATIENT MESSAGE (OUTPATIENT)
Dept: TRANSPLANT | Facility: CLINIC | Age: 63
End: 2023-10-18
Payer: MEDICARE

## 2023-10-18 NOTE — TELEPHONE ENCOUNTER
----- Message from Isatu Patrick MA sent at 10/18/2023 12:15 PM CDT -----  Regarding: FW: Patient advice  Contact: Pt [phone]    ----- Message -----  From: Taylor Newton  Sent: 10/18/2023  12:11 PM CDT  To: [facility] Pre-Kidney Transplant Non-Clinical  Subject: Patient advice                                           Name of Caller:   Kathie     Contact Preference:[phone]    Nature of Call:  Would like to know status of daughter testing process

## 2023-10-27 ENCOUNTER — PATIENT MESSAGE (OUTPATIENT)
Dept: INTERNAL MEDICINE | Facility: CLINIC | Age: 63
End: 2023-10-27
Payer: MEDICARE

## 2023-10-27 DIAGNOSIS — I50.32 CHRONIC DIASTOLIC HEART FAILURE: Chronic | ICD-10-CM

## 2023-10-27 DIAGNOSIS — I15.0 RENOVASCULAR HYPERTENSION: ICD-10-CM

## 2023-10-27 RX ORDER — HYDRALAZINE HYDROCHLORIDE 50 MG/1
50 TABLET, FILM COATED ORAL EVERY 12 HOURS
Qty: 180 TABLET | Refills: 1 | Status: SHIPPED | OUTPATIENT
Start: 2023-10-27

## 2023-10-27 RX ORDER — CARVEDILOL 12.5 MG/1
12.5 TABLET ORAL 2 TIMES DAILY WITH MEALS
Qty: 180 TABLET | Refills: 1 | Status: SHIPPED | OUTPATIENT
Start: 2023-10-27 | End: 2023-11-26

## 2023-10-31 ENCOUNTER — TELEPHONE (OUTPATIENT)
Dept: TRANSPLANT | Facility: CLINIC | Age: 63
End: 2023-10-31
Payer: MEDICARE

## 2023-10-31 DIAGNOSIS — Z76.82 ORGAN TRANSPLANT CANDIDATE: Primary | ICD-10-CM

## 2023-10-31 DIAGNOSIS — T82.898D ARTERIOVENOUS FISTULA OCCLUSION, SUBSEQUENT ENCOUNTER: Primary | ICD-10-CM

## 2023-10-31 NOTE — TELEPHONE ENCOUNTER
"  KIDNEY WAIT LISTING NOTE    Date of Financial clearance to list: 10/17/23    SSN/[facility]:     Organ: Kidney    Last Name: Pilar  First Name: Kathie    : 1960       Gender: female        MRN#: [id number]                                   State of Permanent Residence: [address] [place], [place] [id number]    Ethnicity: Not  or /a   Race:      Black or     CLINICAL INFORMATION   Candidate Medical Urgency Status: Active (1)  Number of Previous Kidney Transplants: 0  Number of Previous Solid Organ Transplants: 0  Did you enter number of previous kidney or other solid organ transplants? Yes  Is this Candidate a Prior Living Donor: No  (If yes, please generate letter to [employer] with patient's date of donation, recipient SSN, signed by Surgical Director after patient is listed in order to receive priority points).      ABO  ABO Blood Group:   B NEG     ABO Confirmation: (THESE DATES MUST BE PRIOR TO THE LIST DATE AND SUPPORTED BY SEPARATE LAB REPORTS)    Internal Results    Lab Results   Component Value Date    GROUPTRH B NEG 2022    GROUPTRH B NEG 10/27/2022     No results found for: "ABO"    External Results    ABO Date 1:    ABO Date 2  Are either of these ABO results based on External Labs? No  (If Yes, STOP and go to source document in Media Tab for verification).    VITALS  Height: 5' 7.95"   Weight: 236 lbs   (Use height from Transplant clinic visits only).  Did you enter height/weight? Yes    HLA    Class I:  Lab Results   Component Value Date    BGOJ0MN 23 2022    MQUG0TQ 30 2022    OEEI1BB 42 2022    TYGN0OQ 53 2022    JYGGO9LP 6 2022    DGOQD9YD 4 2022    BCJNM2CZ 6 2022    SQYPG9CV 17 2022       Class II:  Lab Results   Component Value Date    ZLLIIN77DP 18 2022    JDQFRV66BP 9 2022    EILZDT756MX 52 2022    GWYDCO1220 53 2022    UWUOO6FT 2 2022    TXMVY9UR 4 2022       Tested " "for HLA Antibodies: Yes, antibodies detected     If result is "Positive" antibodies are detected     If result is "Negative or questionable" no antibodies detected    Lab Results   Component Value Date    CIPRAS Positive 11/17/2022    CIIPRAS Positive 11/17/2022       DIALYSIS INFORMATION  Is patient Pre-Dialysis: No     GFR Information  Report GFR being used as the criteria for placement on the kidney list. If not, leave blank  GFR < or = 20 ml/min? n/a  If Yes, Specify value  ___   ml/min     Initial date GFR became 20 or less:   Is GFR obtained from an Outside lab Result? n/a  (If YES verify with source document scanned into media)    If patient on Dialysis:    Is candidate currently on dialysis for ESRD? Yes  If Yes,  Date Chronic Dialysis Started: 08/21/23  Dialysis Unit Name: [facility]  [address] [address]                        Physician Name:  Dr. Lora Means  NPI#: [id number]    DIABETES INFORMATION  Primary Native Kidney Diagnosis: Diabetes Mellitus - Type II  C-Peptide Value - No results found for: "CPEPTIDE"  Current Diabetes Status: Type II    FOR NON-KIDNEY DEPARTMENT USE ONLY:  Additional Organs Registered? none    Maximum Acceptable Number of HLA Mismatches  ABDR:     6      (0-6)               AB:               (0-4)  ADR:   _____  (0-4)              BDR: _____ (0-4)  A:        _____  (0-2)              B:      _____ (0-2)          DR: ______ (0-2)    Will Recipient Accept?   Accept HBcAB Positive Organ:            Yes  Accept HBV MELISA Positive Organ:        No  Accept HCV Antibody Positive Organ: Yes   Accept HCV MELISA Positive Organ: Yes    Dual Kidney and En Bloc Opt In : No  Dual  Local:   No  Dual Import:   No  En Bloc Local:   No  En Bloc Import: No     Accept KDPI > 85: Single: No     Local: No     Import: No  Accept KDPI > 85: Dual: No     Local: No     Import: No    ### NURSE TO VERIFY CONSENT AND MAKE ANY NECESSARY CHANGES NEEDED IN UNET AT THE TIME OF " VERIFICATION ###    Unacceptible Antigens  If yes, list     Lab Results   Component Value Date    CQ3CFWW B45,B76,B44,B82 03/16/2023    CIABCLM INCONCLUSIVE 11/17/2022    CIABCLM INCONCLUSIVE 11/17/2022    CIIAB INVALID DUE TO HIGH BACKGROUND 11/17/2022       ### DO NOT LIST IF ANTIGEN VALUE WEAK ###    Hep B Vaccine series completed: unknown    Blood Type x2 was verified by Jenny Montes RN, and myself.   Blood type determination and reporting was completed according to the programs protocols and OPTN requirements.

## 2023-10-31 NOTE — LETTER
2023  Kathie Quezada  [address] [facility] [address]        Dear Kathie Hamptonblanc:  MRN: [id number]    Congratulations! On 10/31/2023, you were placed on  the waiting list for a  donor transplant.    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    Your transplant coordinator while on the waiting list is Dolores Rios RN. They can be reached at [phone] or [phone] with any questions.      What to do now?    Ask your living donors to call and begin testing  Give your donors our phone number, [phone]  Make sure your donors have your name and date of birth when they call  You will get transplanted much faster if you have a living donor    Have your blood sent to our Transplant Lab every month  If you are on dialysis - our Transplant Lab will work with your dialysis unit to send your blood every month  If you are not on dialysis   If you live near an [facility] lab, we will schedule you to have blood drawn every month  If you do not live near an [facility] lab, you will be sent blood kits in the mail. You will need to take a kit to your local lab or doctor to have your blood drawn every month and mail to the Transplant Lab.     Call us with ANY CHANGES  Phone numbers - we must be able to reach you anytime of the day or night when a kidney is available  Address  Insurance coverage  Dialysis unit or kidney doctor  Gonzalez: if you have surgery, stay in the hospital, have to get blood, or have an infection    Review your Kidney/Kidney-Pancreas Transplant Guide   This will give you detailed information about what happens when  you are on the waiting list   you are called when a kidney is available    The [facility] has a transplant surgeon and physician available 365 days a year, 24 hours a day to coordinate organ acceptance, procurement, surgical placement and to address urgent patient care  issues.  You will be notified in writing of any changes to our Transplant Centers staffing plan that would impact your ability to receive a transplant.    Attached is a letter from the [employer] ([employer]). It describes the services and information offered to patients by [employer] and the Organ Procurement and Transplant Network. We look forward to working with you while on the waiting list.     We would like to inform you of an important OPTN (Organ Procurement and Transplant Network) Policy change that may affect the waiting time for some candidates on our waiting list.     Waiting time is important in identifying who receives offers for kidneys. A long wait time may increase your chance of getting an offer. Wait time is based on a test called eGFR that tells how well your kidneys are working. Wait time could also be based on how long you have been on dialysis. Government and health officials have changed the way this test is used. Before this year, hospitals used an eGFR that would include your race. For Black or  Americans, this eGFR could have shown that their kidneys were working better than they were.    Because of this change, we are looking at everyones record and assessing waiting time for people who are eligible. We will be reviewing everyones medical records and will contact you if you are eligible.     Who can I talk to if I have a question?  You can contact us if you have questions or send a message through [facility].     Please give us time to answer your questions. We are working on this for many patients.    How can I learn more about eGFR and this policy change?  Go to OPTN website > Patients > Kidney > FAQ: Understanding race-neutral eGFR calculations  Full URL: https://optn.transplant.hrsa.gov/patients/by-organ/kidney/understanding-the-proposal-to-require-race-neutral-egfr-calculations/  Call the Organ Procurement and Transplantation Network (OPTN) toll-free patient  services line at 1-345.242.3893    Congratulations,    Your Transplant Partner  [facility]   [facility] [place] [address]  [phone]                                                                The Organ Procurement and Transplantation Network   Toll-free patient services line: [phone]  Your resource for organ transplant information      Staffed 8:30 am - 5:00 pm ET Monday - Friday   Leave a message 24/7 to receive a call back    The Organ Procurement and Transplantation Network (OPTN) is the national transplant system. It makes the policies that decide how donated organs are matched to patients waiting for a transplant. The OPTN:    Makes sure donated organs get matched to people on the transplant waiting list  Tells people about the donation and transplant processes  Makes sure that the public knows about the need for more organ and tissue donations    The OPTN has a free patient services line that you can call to:  Get more information about:   o Organ donation and organ transplants   o Donation and transplant policies  Get an information kit with:   o A list of transplant hospitals   o Waiting list information  Talk about any questions you may have about your transplant hospital or organ procurement organization. The staff will do their best to help you or point you to others who may help.  Find out how you can volunteer with the OPTN and help shape transplant policy    The patient services line number is: [phone]    Patient services line staff CANNOT answer questions about your own medical care, including:  Waiting list status  Test results  Medical records  You will need to call your transplant hospital for this information.    The following websites have more information about transplantation and donation:  OPTN: https://optn.transplant.[facility].gov/  For potential living donors and transplant recipients:   o Living with transplant:  https://www.transplantliving.org/   o Living donation process: https://optn.transplant.hrsa.gov/living-donation/     o Financial assistance: https://www.livingdonorassistance.org/  Transplantation data: https://www.srtr.org/  Organ donation: https://www.organdonor.gov/    Volunteer with the OPTN: https://optn.transplant.hrsa.gov/get-involved

## 2023-10-31 NOTE — TELEPHONE ENCOUNTER
09:43am- called dialysis unit and spoke to Cynthia, asked her to please draw HLA labs tomorrow when patient goes. She will give message to RN along with HLA kit.

## 2023-11-01 DIAGNOSIS — Z76.82 PRE-KIDNEY TRANSPLANT, LISTED: Primary | ICD-10-CM

## 2023-11-01 NOTE — PROGRESS NOTES
YEARLY LIST MANAGEMENT NOTE    Kathie Quezada's kidney transplant listing status reviewed.  Patient is due for follow-up appointments on 8/31/24.  Appointments will be scheduled per protocol.

## 2023-11-02 ENCOUNTER — HOSPITAL ENCOUNTER (OUTPATIENT)
Dept: VASCULAR SURGERY | Facility: CLINIC | Age: 63
Discharge: HOME | End: 2023-11-02
Attending: SURGERY
Payer: MEDICARE

## 2023-11-02 DIAGNOSIS — T82.898D ARTERIOVENOUS FISTULA OCCLUSION, SUBSEQUENT ENCOUNTER: ICD-10-CM

## 2023-11-02 PROCEDURE — 93990 DOPPLER FLOW TESTING: CPT | Mod: HCNC,S$GLB,TXP, | Performed by: SURGERY

## 2023-11-02 PROCEDURE — 93990 PR DUPLEX HEMODIALYSIS ACCESS: ICD-10-PCS | Mod: HCNC,S$GLB,TXP, | Performed by: SURGERY

## 2023-11-22 ENCOUNTER — OFFICE VISIT (OUTPATIENT)
Dept: VASCULAR SURGERY | Facility: CLINIC | Age: 63
End: 2023-11-22
Payer: MEDICARE

## 2023-11-22 ENCOUNTER — DOCUMENTATION ONLY (OUTPATIENT)
Dept: VASCULAR SURGERY | Facility: CLINIC | Age: 63
End: 2023-11-22

## 2023-11-22 VITALS
HEIGHT: 70 IN | HEART RATE: 84 BPM | BODY MASS INDEX: 33.77 KG/M2 | DIASTOLIC BLOOD PRESSURE: 73 MMHG | TEMPERATURE: 99 F | WEIGHT: 235.88 LBS | SYSTOLIC BLOOD PRESSURE: 136 MMHG

## 2023-11-22 DIAGNOSIS — N18.6 ESRD (END STAGE RENAL DISEASE): Primary | ICD-10-CM

## 2023-11-22 DIAGNOSIS — T82.858D ARTERIOVENOUS FISTULA STENOSIS, SUBSEQUENT ENCOUNTER: Primary | ICD-10-CM

## 2023-11-22 PROCEDURE — 99999 PR PBB SHADOW E&M-EST. PATIENT-LVL III: CPT | Mod: PBBFAC,HCNC,TXP, | Performed by: SURGERY

## 2023-11-22 PROCEDURE — 3075F PR MOST RECENT SYSTOLIC BLOOD PRESS GE 130-139MM HG: ICD-10-PCS | Mod: HCNC,CPTII,NTX,S$GLB | Performed by: SURGERY

## 2023-11-22 PROCEDURE — 3075F SYST BP GE 130 - 139MM HG: CPT | Mod: HCNC,CPTII,NTX,S$GLB | Performed by: SURGERY

## 2023-11-22 PROCEDURE — 3078F DIAST BP <80 MM HG: CPT | Mod: HCNC,CPTII,NTX,S$GLB | Performed by: SURGERY

## 2023-11-22 PROCEDURE — 3008F BODY MASS INDEX DOCD: CPT | Mod: HCNC,CPTII,NTX,S$GLB | Performed by: SURGERY

## 2023-11-22 PROCEDURE — 3078F PR MOST RECENT DIASTOLIC BLOOD PRESSURE < 80 MM HG: ICD-10-PCS | Mod: HCNC,CPTII,NTX,S$GLB | Performed by: SURGERY

## 2023-11-22 PROCEDURE — 3008F PR BODY MASS INDEX (BMI) DOCUMENTED: ICD-10-PCS | Mod: HCNC,CPTII,NTX,S$GLB | Performed by: SURGERY

## 2023-11-22 PROCEDURE — 3044F PR MOST RECENT HEMOGLOBIN A1C LEVEL <7.0%: ICD-10-PCS | Mod: HCNC,CPTII,NTX,S$GLB | Performed by: SURGERY

## 2023-11-22 PROCEDURE — 1159F MED LIST DOCD IN RCRD: CPT | Mod: HCNC,CPTII,NTX,S$GLB | Performed by: SURGERY

## 2023-11-22 PROCEDURE — 3044F HG A1C LEVEL LT 7.0%: CPT | Mod: HCNC,CPTII,NTX,S$GLB | Performed by: SURGERY

## 2023-11-22 PROCEDURE — 99214 OFFICE O/P EST MOD 30 MIN: CPT | Mod: HCNC,NTX,S$GLB, | Performed by: SURGERY

## 2023-11-22 PROCEDURE — 1159F PR MEDICATION LIST DOCUMENTED IN MEDICAL RECORD: ICD-10-PCS | Mod: HCNC,CPTII,NTX,S$GLB | Performed by: SURGERY

## 2023-11-22 PROCEDURE — 99214 PR OFFICE/OUTPT VISIT, EST, LEVL IV, 30-39 MIN: ICD-10-PCS | Mod: HCNC,NTX,S$GLB, | Performed by: SURGERY

## 2023-11-22 PROCEDURE — 4010F PR ACE/ARB THEARPY RXD/TAKEN: ICD-10-PCS | Mod: HCNC,CPTII,NTX,S$GLB | Performed by: SURGERY

## 2023-11-22 PROCEDURE — 99213 OFFICE O/P EST LOW 20 MIN: CPT | Mod: PBBFAC,HCNC,TXP | Performed by: SURGERY

## 2023-11-22 PROCEDURE — 3066F NEPHROPATHY DOC TX: CPT | Mod: HCNC,CPTII,NTX,S$GLB | Performed by: SURGERY

## 2023-11-22 PROCEDURE — 99999 PR PBB SHADOW E&M-EST. PATIENT-LVL III: ICD-10-PCS | Mod: PBBFAC,HCNC,TXP, | Performed by: SURGERY

## 2023-11-22 PROCEDURE — 4010F ACE/ARB THERAPY RXD/TAKEN: CPT | Mod: HCNC,CPTII,NTX,S$GLB | Performed by: SURGERY

## 2023-11-22 PROCEDURE — 3066F PR DOCUMENTATION OF TREATMENT FOR NEPHROPATHY: ICD-10-PCS | Mod: HCNC,CPTII,NTX,S$GLB | Performed by: SURGERY

## 2023-11-22 NOTE — PROGRESS NOTES
Prednisone prep for procedure on 11/30/23 with Dr. Tavera called in to patient's preferred pharmacy per vascular surgery standing order.

## 2023-11-22 NOTE — PROGRESS NOTES
Kathie Quezada  11/22/2023    HPI:  Patient is a 63 y.o. female with a h/o obesity (BMI 34), stage V CKD on HD since Aug 2023, DMII with retinopathy, HTN, CVA, Factor 11 deficiency, and multiple AKIS the last was from urinary retention who is here today for f/u. She goes to HD on MWF, and they continue to use her HD cath.   She is R handed with an obese upper arm.  Pt is unsure of timeframe for needing dialysis.  Last GFR 11.5, Cr 3.9.  Hypertensive today to 170s SBP.     S/p   8/15/23  L TRA, L BVT AVF fistulagram  PTA proximal stenoses x2 with 6x40mm Mehran balloon; outflow stenosis with 6x40mm Mehran balloon     Findings/Key Components:  Successful treatment of proximal stenosis > 75%  Good palpable thrill    3/23/23  L TRA, L BVT AVF - PTA proximal diffuse stenoses with 6x80mm DCB (Lutonix) balloon    1/31/23  Transposed L BVT,. 2nd stage  Findings: 2nd stage L BVT AVF done; dilated to 5-6 mm with strong thrill throughout course.     12/16/22  L TRA, PTA proximal (diffuse) stenoses x3 with 6x60mm Sterlin balloon     10/27/22  Creation of 1st stage L BVT AVF     Findings/Key Components:  L basilic vein dilated to 3-4mm; segment of 8-10cm was mobilized at a/c fossa  Strong thrill  Triphasic L ulnar doppler signal with minimal augmentation with AVF compression  Obese upper arm (BMI 38)  Consider transposition in 3-6months+     No hx of MI  Hx of CHF, EF 65%%, enlarged RA   Stroke ~3 years ago, no residual deficits   Tobacco use: never     Current Outpatient Medications:     calcium acetate,phosphat bind, (PHOSLO) 667 mg capsule, Take 2 capsules by mouth 3 (three) times daily, Disp: 180 capsule, Rfl: 11    cholecalciferol, vitamin D3, 125 mcg (5,000 unit) Tab, Take 1 tablet (5,000 Units total) by mouth once daily., Disp: 90 tablet, Rfl: 3    EScitalopram oxalate (LEXAPRO) 10 MG tablet, Take 1 tablet (10 mg total) by mouth once daily. (Patient taking differently: Take 10 mg by mouth every evening.),  Disp: 90 tablet, Rfl: 3    ferrous gluconate (FERGON) 324 MG tablet, Take 1 tablet (324 mg total) by mouth daily with breakfast., Disp: 90 tablet, Rfl: 1    furosemide (LASIX) 40 MG tablet, Take 2 tablets (80 mg total) by mouth 2 (two) times a day., Disp: 120 tablet, Rfl: 3    hydrALAZINE (APRESOLINE) 50 MG tablet, Take 1 tablet (50 mg total) by mouth every 12 (twelve) hours., Disp: 180 tablet, Rfl: 1    irbesartan (AVAPRO) 300 MG tablet, Take 1 tablet by mouth daily, Disp: 30 tablet, Rfl: 11    levocetirizine (XYZAL) 5 MG tablet, Take 1 tablet (5 mg total) by mouth daily as needed for Allergies., Disp: 90 tablet, Rfl: 3    NIFEdipine (ADALAT CC) 60 MG TbSR, Take 1 tablet by mouth daily, Disp: 30 tablet, Rfl: 11    OPW TEST CLAIM - DO NOT FILL, OPW test claim. Do not fill., Disp: 1 tablet, Rfl: 0    SYNTHROID 200 mcg tablet, Take 1 tablet (200 mcg total) by mouth before breakfast., Disp: 90 tablet, Rfl: 3    traZODone (DESYREL) 50 MG tablet, Take 1 to 2 tablets ( mg total) by mouth nightly as needed for Insomnia., Disp: 120 tablet, Rfl: 3    carvediloL (COREG) 12.5 MG tablet, Take 1 tablet (12.5 mg total) by mouth 2 (two) times daily with meals., Disp: 180 tablet, Rfl: 1    PHYSICAL EXAM:   Right Arm BP - Sittin/73 (23 0850)  Pulse: 84  Temp: 98.7 °F (37.1 °C)                 Neck: Supple, no significant adenopathy; thyroid is not enlarged          Abdomen: Soft, nontender, nondistended, no masses or organomegaly     No rebound tenderness noted; bowel sounds normal     No groin adenopathy      Extremities:   3 cm scar overlying L antecubital fossa      Significant obesity upper arm      Proximal L BVT AVF moderate thrill no pulsatility          LAB RESULTS:  Lab Results   Component Value Date    K 3.9 08/10/2023    K 4.2 2023    K 4.5 2023    CREATININE 4.2 (H) 08/10/2023    CREATININE 4.5 (H) 2023    CREATININE 4.3 (H) 2023     Lab Results   Component Value Date    WBC  11.82 08/10/2023    WBC 9.14 2023    WBC 8.73 2023    HCT 33.6 (L) 08/10/2023    HCT 28.7 (L) 2023    HCT 32.4 (L) 2023     08/10/2023     (L) 2023     (L) 2023     Lab Results   Component Value Date    HGBA1C 5.3 2023    HGBA1C 5.2 2023    HGBA1C 5.5 2022     IMAGIN2023   AVF u/s: flow 453 ml/min (prior: 734 ml/min) PSVs elevated 624 cms distal to anastomosis and 364cm/s distal segment    Diameter fistula proximal 4.9 mm  Diameter fistula mid 6.4mm  Depths 13 -20mm    Prior:  U/S: flow vol 1090 ml/min   cm/s proximally and 339 cm/ds in outflow  Diam 7-8mm  Depth 7.9 - 14mm    AVF u/s: flow vol 1,326 ml/min  PSVs 688 and 597 cm/s proximally -- 'long segment narrowing beyond anastomosis'    AVF u/s: flow vol 1127 ml/min  PSVs 706 cm/s proximally -- 'long segment narrowing beyond anastomosis'    IMP/PLAN:     63 y.o. female with a h/o DMII with retinopathy, HTN, stroke and obesity (BMI 40), and multiple AKIs the last was from urinary retention -- s/p L BVT AVF (2-stages) and prior PTA diffuse proximal stenosis -- recurrent proximal single and now mid- lesions  Significantly Obese L upper arm  Now s/p x2 L TRA  and extensive PTA - good thrill  Despite elevated PSVs - good thrill and caliber > 5 - 6 mm  Will attempt L TRA and PTA once more -- plan for 6-7+ mm diameter 23  If this does not work for HD, will need RUE CHELSIE Tavera MD DFSVS FACS   Vascular/Endovascular Surgery

## 2023-11-25 RX ORDER — SODIUM CHLORIDE 9 MG/ML
INJECTION, SOLUTION INTRAVENOUS CONTINUOUS
Status: CANCELLED | OUTPATIENT
Start: 2023-11-25

## 2023-11-26 ENCOUNTER — HOSPITAL ENCOUNTER (OUTPATIENT)
Facility: HOSPITAL | Age: 63
Discharge: HOME | End: 2023-11-29
Attending: STUDENT IN AN ORGANIZED HEALTH CARE EDUCATION/TRAINING PROGRAM | Admitting: HOSPITALIST
Payer: MEDICARE

## 2023-11-26 DIAGNOSIS — N18.6 ESRD (END STAGE RENAL DISEASE) ON DIALYSIS: ICD-10-CM

## 2023-11-26 DIAGNOSIS — Z99.2 ESRD (END STAGE RENAL DISEASE) ON DIALYSIS: ICD-10-CM

## 2023-11-26 DIAGNOSIS — Z78.9 PROBLEM WITH VASCULAR ACCESS: Primary | ICD-10-CM

## 2023-11-26 DIAGNOSIS — R07.9 CHEST PAIN: ICD-10-CM

## 2023-11-26 LAB
ALBUMIN SERPL BCP-MCNC: 3.7 G/DL (ref 3.5–5.2)
ALP SERPL-CCNC: 83 U/L (ref 55–135)
ALT SERPL W/O P-5'-P-CCNC: 7 U/L (ref 10–44)
ANION GAP SERPL CALC-SCNC: 12 MMOL/L (ref 8–16)
AST SERPL-CCNC: 13 U/L (ref 10–40)
BASOPHILS # BLD AUTO: 0.06 K/UL (ref 0–0.2)
BASOPHILS NFR BLD: 0.6 % (ref 0–1.9)
BILIRUB SERPL-MCNC: 0.7 MG/DL (ref 0.1–1)
BUN SERPL-MCNC: 42 MG/DL (ref 8–23)
CALCIUM SERPL-MCNC: 9.2 MG/DL (ref 8.7–10.5)
CHLORIDE SERPL-SCNC: 104 MMOL/L (ref 95–110)
CO2 SERPL-SCNC: 26 MMOL/L (ref 23–29)
CREAT SERPL-MCNC: 4.2 MG/DL (ref 0.5–1.4)
DIFFERENTIAL METHOD: ABNORMAL
EOSINOPHIL # BLD AUTO: 0.1 K/UL (ref 0–0.5)
EOSINOPHIL NFR BLD: 1.5 % (ref 0–8)
ERYTHROCYTE [DISTWIDTH] IN BLOOD BY AUTOMATED COUNT: 13.3 % (ref 11.5–14.5)
EST. GFR  (NO RACE VARIABLE): 11.5 ML/MIN/1.73 M^2
GLUCOSE SERPL-MCNC: 104 MG/DL (ref 70–110)
HCT VFR BLD AUTO: 33.3 % (ref 37–48.5)
HGB BLD-MCNC: 10.8 G/DL (ref 12–16)
IMM GRANULOCYTES # BLD AUTO: 0.03 K/UL (ref 0–0.04)
IMM GRANULOCYTES NFR BLD AUTO: 0.3 % (ref 0–0.5)
LYMPHOCYTES # BLD AUTO: 2.1 K/UL (ref 1–4.8)
LYMPHOCYTES NFR BLD: 22 % (ref 18–48)
MCH RBC QN AUTO: 27.5 PG (ref 27–31)
MCHC RBC AUTO-ENTMCNC: 32.4 G/DL (ref 32–36)
MCV RBC AUTO: 85 FL (ref 82–98)
MONOCYTES # BLD AUTO: 0.5 K/UL (ref 0.3–1)
MONOCYTES NFR BLD: 5.3 % (ref 4–15)
NEUTROPHILS # BLD AUTO: 6.7 K/UL (ref 1.8–7.7)
NEUTROPHILS NFR BLD: 70.3 % (ref 38–73)
NRBC BLD-RTO: 0 /100 WBC
PLATELET # BLD AUTO: 183 K/UL (ref 150–450)
PMV BLD AUTO: 10.2 FL (ref 9.2–12.9)
POCT GLUCOSE: 178 MG/DL (ref 70–110)
POTASSIUM SERPL-SCNC: 3.5 MMOL/L (ref 3.5–5.1)
PROT SERPL-MCNC: 7.8 G/DL (ref 6–8.4)
RBC # BLD AUTO: 3.93 M/UL (ref 4–5.4)
SODIUM SERPL-SCNC: 142 MMOL/L (ref 136–145)
WBC # BLD AUTO: 9.54 K/UL (ref 3.9–12.7)

## 2023-11-26 PROCEDURE — 25000003 PHARM REV CODE 250: Mod: HCNC,NTX

## 2023-11-26 PROCEDURE — G0378 HOSPITAL OBSERVATION PER HR: HCPCS | Mod: HCNC,NTX

## 2023-11-26 PROCEDURE — 93010 EKG 12-LEAD: ICD-10-PCS | Mod: HCNC,NTX,, | Performed by: INTERNAL MEDICINE

## 2023-11-26 PROCEDURE — 96372 THER/PROPH/DIAG INJ SC/IM: CPT

## 2023-11-26 PROCEDURE — 63600175 PHARM REV CODE 636 W HCPCS: Mod: HCNC,NTX

## 2023-11-26 PROCEDURE — 80053 COMPREHEN METABOLIC PANEL: CPT | Mod: HCNC,NTX | Performed by: STUDENT IN AN ORGANIZED HEALTH CARE EDUCATION/TRAINING PROGRAM

## 2023-11-26 PROCEDURE — 99214 PR OFFICE/OUTPT VISIT, EST, LEVL IV, 30-39 MIN: ICD-10-PCS | Mod: HCNC,NTX,, | Performed by: INTERNAL MEDICINE

## 2023-11-26 PROCEDURE — 93010 ELECTROCARDIOGRAM REPORT: CPT | Mod: HCNC,NTX,, | Performed by: INTERNAL MEDICINE

## 2023-11-26 PROCEDURE — 99285 EMERGENCY DEPT VISIT HI MDM: CPT | Mod: 25,HCNC,NTX

## 2023-11-26 PROCEDURE — 99214 OFFICE O/P EST MOD 30 MIN: CPT | Mod: HCNC,NTX,, | Performed by: INTERNAL MEDICINE

## 2023-11-26 PROCEDURE — 93005 ELECTROCARDIOGRAM TRACING: CPT | Mod: HCNC,NTX

## 2023-11-26 PROCEDURE — 85025 COMPLETE CBC W/AUTO DIFF WBC: CPT | Mod: HCNC,NTX | Performed by: STUDENT IN AN ORGANIZED HEALTH CARE EDUCATION/TRAINING PROGRAM

## 2023-11-26 RX ORDER — GLUCAGON 1 MG
1 KIT INJECTION
Status: DISCONTINUED | OUTPATIENT
Start: 2023-11-26 | End: 2023-11-29 | Stop reason: HOSPADM

## 2023-11-26 RX ORDER — NALOXONE HCL 0.4 MG/ML
0.02 VIAL (ML) INJECTION
Status: DISCONTINUED | OUTPATIENT
Start: 2023-11-26 | End: 2023-11-29 | Stop reason: HOSPADM

## 2023-11-26 RX ORDER — LOSARTAN POTASSIUM 50 MG/1
100 TABLET ORAL DAILY
Status: DISCONTINUED | OUTPATIENT
Start: 2023-11-26 | End: 2023-11-29 | Stop reason: HOSPADM

## 2023-11-26 RX ORDER — ONDANSETRON 8 MG/1
8 TABLET, ORALLY DISINTEGRATING ORAL EVERY 8 HOURS PRN
Status: DISCONTINUED | OUTPATIENT
Start: 2023-11-26 | End: 2023-11-29 | Stop reason: HOSPADM

## 2023-11-26 RX ORDER — HEPARIN SODIUM 5000 [USP'U]/ML
5000 INJECTION, SOLUTION INTRAVENOUS; SUBCUTANEOUS EVERY 8 HOURS
Status: DISCONTINUED | OUTPATIENT
Start: 2023-11-26 | End: 2023-11-29 | Stop reason: HOSPADM

## 2023-11-26 RX ORDER — LEVOTHYROXINE SODIUM 100 UG/1
200 TABLET ORAL
Status: DISCONTINUED | OUTPATIENT
Start: 2023-11-27 | End: 2023-11-29 | Stop reason: HOSPADM

## 2023-11-26 RX ORDER — SODIUM CHLORIDE 0.9 % (FLUSH) 0.9 %
10 SYRINGE (ML) INJECTION EVERY 12 HOURS PRN
Status: DISCONTINUED | OUTPATIENT
Start: 2023-11-26 | End: 2023-11-29 | Stop reason: HOSPADM

## 2023-11-26 RX ORDER — ACETAMINOPHEN 500 MG
5000 TABLET ORAL DAILY
Status: DISCONTINUED | OUTPATIENT
Start: 2023-11-26 | End: 2023-11-29 | Stop reason: HOSPADM

## 2023-11-26 RX ORDER — SODIUM CHLORIDE 0.9 % (FLUSH) 0.9 %
10 SYRINGE (ML) INJECTION
Status: DISCONTINUED | OUTPATIENT
Start: 2023-11-26 | End: 2023-11-29 | Stop reason: HOSPADM

## 2023-11-26 RX ORDER — ESCITALOPRAM OXALATE 10 MG/1
10 TABLET ORAL NIGHTLY
Status: DISCONTINUED | OUTPATIENT
Start: 2023-11-26 | End: 2023-11-29 | Stop reason: HOSPADM

## 2023-11-26 RX ORDER — IPRATROPIUM BROMIDE AND ALBUTEROL SULFATE 2.5; .5 MG/3ML; MG/3ML
3 SOLUTION RESPIRATORY (INHALATION) EVERY 4 HOURS PRN
Status: DISCONTINUED | OUTPATIENT
Start: 2023-11-26 | End: 2023-11-29 | Stop reason: HOSPADM

## 2023-11-26 RX ORDER — IBUPROFEN 200 MG
16 TABLET ORAL
Status: DISCONTINUED | OUTPATIENT
Start: 2023-11-26 | End: 2023-11-29 | Stop reason: HOSPADM

## 2023-11-26 RX ORDER — ACETAMINOPHEN 325 MG/1
650 TABLET ORAL EVERY 4 HOURS PRN
Status: DISCONTINUED | OUTPATIENT
Start: 2023-11-26 | End: 2023-11-28

## 2023-11-26 RX ORDER — CALCIUM ACETATE 667 MG/1
1334 CAPSULE ORAL
Status: DISCONTINUED | OUTPATIENT
Start: 2023-11-26 | End: 2023-11-29 | Stop reason: HOSPADM

## 2023-11-26 RX ORDER — PROMETHAZINE HYDROCHLORIDE 25 MG/1
25 TABLET ORAL EVERY 6 HOURS PRN
Status: DISCONTINUED | OUTPATIENT
Start: 2023-11-26 | End: 2023-11-29 | Stop reason: HOSPADM

## 2023-11-26 RX ORDER — INSULIN ASPART 100 [IU]/ML
0-5 INJECTION, SOLUTION INTRAVENOUS; SUBCUTANEOUS
Status: DISCONTINUED | OUTPATIENT
Start: 2023-11-26 | End: 2023-11-29 | Stop reason: HOSPADM

## 2023-11-26 RX ORDER — IBUPROFEN 200 MG
24 TABLET ORAL
Status: DISCONTINUED | OUTPATIENT
Start: 2023-11-26 | End: 2023-11-29 | Stop reason: HOSPADM

## 2023-11-26 RX ORDER — FERROUS GLUCONATE 324(38)MG
324 TABLET ORAL
Status: DISCONTINUED | OUTPATIENT
Start: 2023-11-27 | End: 2023-11-29 | Stop reason: HOSPADM

## 2023-11-26 RX ORDER — TALC
6 POWDER (GRAM) TOPICAL NIGHTLY PRN
Status: DISCONTINUED | OUTPATIENT
Start: 2023-11-26 | End: 2023-11-29 | Stop reason: HOSPADM

## 2023-11-26 RX ORDER — POLYETHYLENE GLYCOL 3350 17 G/17G
17 POWDER, FOR SOLUTION ORAL DAILY PRN
Status: DISCONTINUED | OUTPATIENT
Start: 2023-11-26 | End: 2023-11-29 | Stop reason: HOSPADM

## 2023-11-26 RX ORDER — TRAZODONE HYDROCHLORIDE 100 MG/1
100 TABLET ORAL NIGHTLY PRN
Status: DISCONTINUED | OUTPATIENT
Start: 2023-11-26 | End: 2023-11-29 | Stop reason: HOSPADM

## 2023-11-26 RX ORDER — NIFEDIPINE 60 MG/1
60 TABLET, EXTENDED RELEASE ORAL DAILY
Status: DISCONTINUED | OUTPATIENT
Start: 2023-11-26 | End: 2023-11-29 | Stop reason: HOSPADM

## 2023-11-26 RX ORDER — HYDRALAZINE HYDROCHLORIDE 50 MG/1
50 TABLET, FILM COATED ORAL EVERY 12 HOURS
Status: DISCONTINUED | OUTPATIENT
Start: 2023-11-26 | End: 2023-11-29 | Stop reason: HOSPADM

## 2023-11-26 RX ADMIN — TRAZODONE HYDROCHLORIDE 100 MG: 100 TABLET ORAL at 09:11

## 2023-11-26 RX ADMIN — LOSARTAN POTASSIUM 100 MG: 50 TABLET, FILM COATED ORAL at 01:11

## 2023-11-26 RX ADMIN — ESCITALOPRAM OXALATE 10 MG: 10 TABLET ORAL at 09:11

## 2023-11-26 RX ADMIN — CALCIUM ACETATE 1334 MG: 667 CAPSULE ORAL at 05:11

## 2023-11-26 RX ADMIN — CHOLECALCIFEROL TAB 125 MCG (5000 UNIT) 5000 UNITS: 125 TAB at 01:11

## 2023-11-26 RX ADMIN — HYDRALAZINE HYDROCHLORIDE 50 MG: 50 TABLET ORAL at 09:11

## 2023-11-26 NOTE — ED PROVIDER NOTES
Source of History  patient and EMR    Chief Complaint    Vascular Access Problem (R upper chest dialysis came out sometime while sleeping, no drainage or swelling, last dialysis friday)      History of Present Illness    Kathie Quezada is a 63 y.o. female presenting with concern for her right upper chest wall dialysis line coming out overnight.  She reports that she had some oozing, and put a bandage over.  No drainage or swelling at the site, no dyspnea.  Last had full session of dialysis Friday (2 days ago) in his due for another session tomorrow.    Her end-stage renal disease secondary to longstanding hypertension and diabetes.  She was supposed to get AV fistula surgery this upcoming week.  She has had the chest wall line for several months as this has been planned.    Review of Systems    As per HPI and below:  Constitutional symptoms:  No chills, no sweats, no fever   Skin symptoms:  No rash    Eye symptoms:  No blurred vision  ENMT symptoms:  No sore throat    Respiratory symptoms:  No shortness of breath  Cardiovascular symptoms:  No chest pain , mild oozing at the site of the previous line  Gastrointestinal symptoms:  No abdominal pain  Genitourinary symptoms:  No dysuria  Musculoskeletal symptoms:  No back pain  Neurologic symptoms:  No headache, no weakness  Endocrine symptoms:  None except as in HPI     Past History    As per HPI and below:  Past Medical History:   Diagnosis Date    Acute respiratory failure with hypoxia and hypercapnia 05/26/2019    ATN (acute tubular necrosis) 02/08/2019    Chronic diastolic heart failure 02/07/2019    2-8-19 Mild left atrial enlargement. Mild left ventricular enlargement. Normal left ventricular systolic function. The estimated ejection fraction is 55% Indeterminate left ventricular diastolic function. Normal right ventricular systolic function. Mild mitral regurgitation. Mild to moderate tricuspid regurgitation. The estimated PA systolic pressure is 32 mm  Hg Normal central venous pressure (3 m    CKD (chronic kidney disease) stage 4, GFR 15-29 ml/min 05/16/2018    CKD stage G3a/A3, GFR 45-59 and albumin creatinine ratio >300 mg/g 05/16/2018    CKD stage G3b/A3, GFR 30-44 and albumin creatinine ratio >300 mg/g 05/16/2018    Closed compression fracture of L2 lumbar vertebra 05/24/2019    Closed compression fracture of L2 lumbar vertebra 05/24/2019     IMO Regulatory Load October 2019    Controlled type 2 diabetes with retinopathy 04/09/2018    COVID-19 virus infection 7-1-2021 07/01/2021    CVA (cerebral vascular accident) 2017    Diabetic polyneuropathy associated with type 2 diabetes mellitus 12/04/2020    Encounter for blood transfusion     Essential hypertension 04/09/2018    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  7-31-19 Factor XI Activity 55 - 145 % 96       Gastroesophageal reflux disease without esophagitis 05/24/2019    Generalized anxiety disorder 11/08/2019    GERD (gastroesophageal reflux disease)     GIB (gastrointestinal bleeding) 10/18/2018    History of CVA (cerebrovascular accident) without residual deficits 11/27/2022    History of hepatitis C     s/p succesful Rx w/ Harvoni  - SVR12 (cure) 2016    Hypertensive retinopathy, bilateral 12/10/2019    Mild nonproliferative diabetic retinopathy 05/02/2018    Mixed hyperlipidemia 04/09/2018    Myalgia due to statin 09/07/2022    Normocytic anemia 01/14/2022    NS (nuclear sclerosis), bilateral 12/10/2019    Peanut allergy 02/04/2023    PNA (pneumonia) 05/28/2019    Postoperative hypothyroidism     Renovascular hypertension 04/09/2018    S/P kyphoplasty 08/14/2019 8/14/19: L2 kyphoplasty with Dr. Kwon     Slow transit constipation 05/27/2019    Type 2 diabetes mellitus with circulatory disorder, with long-term current use of insulin 04/09/2018    Type 2 diabetes mellitus with stage 3 chronic kidney disease, without long-term current use of insulin 04/09/2018    Type 2 diabetes  mellitus with stage 3a chronic kidney disease, without long-term current use of insulin 03/03/2021    Type 2 diabetes mellitus with stage 3b chronic kidney disease, without long-term current use of insulin 09/07/2022       Past Surgical History:   Procedure Laterality Date    AV FISTULA PLACEMENT Left 10/27/2022    Procedure: CREATION, AV FISTULA;  Surgeon: Tirso Tavera MD;  Location: [facility] OR [address] [place] [address];  Service: Peripheral Vascular;  Laterality: Left;  Brachio basilic 1st stage     BACK SURGERY  2017    CHOLECYSTECTOMY  2015    [facility]    COLONOSCOPY  11/13/2015    Dr Chino torrse prep. internal hemorrhoids. diverticulosis of sigmois. NO polyps. repeat due 2025    FISTULOGRAM N/A 12/16/2022    Procedure: Fistulogram;  Surgeon: Tirso Tavera MD;  Location: [facility] CATH LAB;  Service: Peripheral Vascular;  Laterality: N/A;    FISTULOGRAM, WITH PTA Left 3/23/2023    Procedure: FISTULOGRAM, WITH PTA;  Surgeon: Tirso Tavera MD;  [facility];  Service: Peripheral Vascular;  Laterality: Left;  LUE TRANSRADIAL CO2 FISTULOGRAM    1.9 min  22.99 mGy  4.3032 Gy.cm  200cc CO2  9ml TransRadial Solution    FISTULOGRAM, WITH PTA Left 8/15/2023    Procedure: FISTULOGRAM, WITH PTA TRANSRADIAL;  Surgeon: Tirso Tavera MD;  [facility];  Service: Peripheral Vascular;  Laterality: Left;  Fluoro: 5.1 min, mGy: 7.95, Gycm2: 1.2626, contrast: 12ml    FIXATION KYPHOPLASTY Bilateral 08/14/2019    Procedure: L2 kyphoplasty Fluoro Globus;  Surgeon: Jeremie Kwon DO;  Location: [facility] OR;  Service: Neurosurgery;  Laterality: Bilateral;  GLOBUS MACRINA YOQUINN [phone] TEXTED HER @ 10:54AM ON 7-30-19  PRE-OP-BY RN  8-7-2019    HYSTERECTOMY      OOPHORECTOMY  1996    one    PERCUTANEOUS TRANSLUMINAL ANGIOPLASTY OF ARTERIOVENOUS FISTULA Left 12/16/2022    Procedure: PTA, AV FISTULA;  Surgeon: Tirso Tavera MD;  Location: [facility] CATH LAB;  Service: Peripheral Vascular;  Laterality: Left;    RIGHT HEART  CATHETERIZATION Right 6/15/2023    Procedure: INSERTION, CATHETER, RIGHT HEART;  Surgeon: Lucretia Rivas MD;  Location: [facility] CATH LAB;  Service: Cardiology;  Laterality: Right;    THYROIDECTOMY  2016    at [facility] for thyroid nodule    TONSILLECTOMY      TRANSPOSITION OF BASILIC VEIN Left 1/31/2023    Procedure: TRANSPOSITION, VEIN, BASILIC;  Surgeon: Tirso Tavera MD;  Location: [facility] OR [address] [place] [address];  Service: Peripheral Vascular;  Laterality: Left;       Social History     Socioeconomic History    Marital status:    Tobacco Use    Smoking status: Never     Passive exposure: Past    Smokeless tobacco: Never   Substance and Sexual Activity    Alcohol use: Not Currently    Drug use: No    Sexual activity: Not Currently   Social History Narrative    Pt enjoys cooking and baking and making Floral arrangement and [employer]     Caregiver Daughter Mike     Social Determinants of Health     Financial Resource Strain: Low Risk  (10/7/2023)    Overall Financial Resource Strain (CARDIA)     Difficulty of Paying Living Expenses: Not hard at all   Recent Concern: Financial Resource Strain - Medium Risk (8/24/2023)    Overall Financial Resource Strain (CARDIA)     Difficulty of Paying Living Expenses: Somewhat hard   Food Insecurity: No Food Insecurity (10/7/2023)    Hunger Vital Sign     Worried About Running Out of Food in the Last Year: Never true     Ran Out of Food in the Last Year: Never true   Recent Concern: Food Insecurity - Food Insecurity Present (9/19/2023)    Hunger Vital Sign     Worried About Running Out of Food in the Last Year: Sometimes true     Ran Out of Food in the Last Year: Sometimes true   Transportation Needs: No Transportation Needs (10/7/2023)    PRAPARE - Transportation     Lack of Transportation (Medical): No     Lack of Transportation (Non-Medical): No   Recent Concern: Transportation Needs - Unmet Transportation Needs (9/19/2023)    PRAPARE - Transportation     Lack of Transportation  (Medical): No     Lack of Transportation (Non-Medical): Yes   Physical Activity: Insufficiently Active (10/7/2023)    Exercise Vital Sign     Days of Exercise per Week: 3 days     Minutes of Exercise per Session: 30 min   Stress: Stress Concern Present (10/7/2023)    Citizen of [place] of Occupational Health - Occupational Stress Questionnaire     Feeling of Stress : To some extent   Social Connections: Unknown (10/7/2023)    Social Connection and Isolation Panel [NHANES]     Frequency of Communication with Friends and Family: More than three times a week     Frequency of Social Gatherings with Friends and Family: More than three times a week     Attends Holiness Services: Patient refused     Active Member of Clubs or Organizations: No     Attends Club or Organization Meetings: Patient refused     Marital Status:    Housing Stability: Low Risk  (10/7/2023)    Housing Stability Vital Sign     Unable to Pay for Housing in the Last Year: No     Number of Places Lived in the Last Year: 1     Unstable Housing in the Last Year: No       Family History   Problem Relation Age of Onset    Stroke Mother     Heart disease Mother     Hypertension Mother     Diabetes type II Mother     Coronary artery disease Mother     Hypertension Father     Prostate cancer Father     Aneurysm Brother        Review of patient's allergies indicates:   Allergen Reactions    Atorvastatin Other (See Comments)     Statin induced myalgia    Mushroom Hives    Peanut Anaphylaxis    Bactrim [sulfamethoxazole-trimethoprim] Hives    Gabapentin      Pruritis     Iodine     Peanut butter flavor     Penicillins Hives    Shellfish containing products Itching    Sulfa (sulfonamide antibiotics) Hives       No current facility-administered medications on file prior to encounter.     Current Outpatient Medications on File Prior to Encounter   Medication Sig Dispense Refill    NIFEdipine (ADALAT CC) 60 MG TbSR Take 1 tablet by mouth daily 30 tablet 11     "SYNTHROID 200 mcg tablet Take 1 tablet (200 mcg total) by mouth before breakfast. 90 tablet 3    calcium acetate,phosphat bind, (PHOSLO) 667 mg capsule Take 2 capsules by mouth 3 (three) times daily 180 capsule 11    carvediloL (COREG) 12.5 MG tablet Take 1 tablet (12.5 mg total) by mouth 2 (two) times daily with meals. 180 tablet 1    cholecalciferol, vitamin D3, 125 mcg (5,000 unit) Tab Take 1 tablet (5,000 Units total) by mouth once daily. 90 tablet 3    EScitalopram oxalate (LEXAPRO) 10 MG tablet Take 1 tablet (10 mg total) by mouth once daily. (Patient taking differently: Take 10 mg by mouth every evening.) 90 tablet 3    ferrous gluconate (FERGON) 324 MG tablet Take 1 tablet (324 mg total) by mouth daily with breakfast. 90 tablet 1    furosemide (LASIX) 40 MG tablet Take 2 tablets (80 mg total) by mouth 2 (two) times a day. 120 tablet 3    hydrALAZINE (APRESOLINE) 50 MG tablet Take 1 tablet (50 mg total) by mouth every 12 (twelve) hours. 180 tablet 1    irbesartan (AVAPRO) 300 MG tablet Take 1 tablet by mouth daily 30 tablet 11    levocetirizine (XYZAL) 5 MG tablet Take 1 tablet (5 mg total) by mouth daily as needed for Allergies. 90 tablet 3    OPW TEST CLAIM - DO NOT FILL OPW test claim. Do not fill. 1 tablet 0    traZODone (DESYREL) 50 MG tablet Take 1 to 2 tablets ( mg total) by mouth nightly as needed for Insomnia. 120 tablet 3       Physical Exam    Reviewed nursing notes.  Vitals:    11/26/23 1009   BP: (!) 176/85   Pulse: 81   Resp: 20   Temp: 99.4 °F (37.4 °C)   TempSrc: Oral   SpO2: 97%   Weight: 108 kg (238 lb)   Height: 5' 10" (1.778 m)     General:  Alert, no acute distress.    Skin:  Warm, dry, intact.  No rash.  Head:  Normocephalic, atraumatic.    Neck:  Supple.   Eye:  extraocular movements are intact.    Ears, nose, mouth and throat:  Normal phonation.  Cardiovascular:  No edema.  Regular pulses.    Chest wall: oozing from chest wall line wound but no " swelling/erythema/drainage  Respiratory:  Respirations are non-labored. Clear bilaterally.  Gastrointestinal:  Nondistended.  No belching or vomiting.  Back: Normal gait.  Ambulatory.  Musculoskeletal:  Normal range of motion observed.  Neurological:  Alert and oriented to person, place, time, and situation.  No focal deficits observed.   Psychiatric:  Cooperative, appropriate mood & affect.       Initial MDM and Data Review    63 y.o. female presenting for evaluation of dialysis line accidentally dislodged from chest wall.    Differential includes:  Pneumothorax, less likely to be vascular injury, less likely hyperkalemia given recent dialysis but will check    Work-up includes:  CBC, CMP, EKG    Interventions include:  Temporization if needed for hyperkalemia    The patient has significant medical comorbidities that influence decision making for this acute process, such as:  Hypertension, end-stage renal disease, hyperlipidemia    I decided to obtain the patient's medical records and review relevant documentation from hospital records.  Pertinent information is noted.      Medications - No data to display    Results and ED Course    Labs Reviewed   CBC W/ AUTO DIFFERENTIAL - Abnormal; Notable for the following components:       Result Value    RBC 3.93 (*)     Hemoglobin 10.8 (*)     Hematocrit 33.3 (*)     All other components within normal limits   COMPREHENSIVE METABOLIC PANEL - Abnormal; Notable for the following components:    BUN 42 (*)     Creatinine 4.2 (*)     ALT 7 (*)     eGFR 11.5 (*)     All other components within normal limits       Imaging Results              X-Ray Chest PA And Lateral (In process)  Result time 11/26/23 11:31:53                    ED Course as of 11/26/23 1137   Sun Nov 26, 2023   1120 Potassium: 3.5 [AC]   1126 X-Ray Chest PA And Lateral  No obvious pneumothorax, no pulmonary edema [AC]      ED Course User Index  [AC] Juan Carlos Summers DO           EKG interpreted by  myself    EKG  Performed: 1031  Rate/Rhythm/Axis: 88 bpm, sinus rhythm, nml axis  QRS 90 ms  Qtc 445 ms  Impression:  Normal Sinus Rhythm.  EKG without evidence of Na channel blockade, K channel blockade, there is no prolonged QTc or digoxin effect.  There is no STEMI or signs of ischemia.      Relevant imaging interpreted by myself  No evidence of pneumothorax    Impression and Plan    63 y.o. female with findings of chest wall dialysis catheter has been dislodged based on the work up in the emergency department as above.    Important lab/imaging findings include:  As above    All tests, treatment options and disposition options were discussed with the patient.  The decision was made to admit the patient to the hospital.    The patient was admitted in stable condition and all further questions/concerns by patient and/or family were addressed.    Patient needs admission for placement of catheter, non urgently, potassium normal.         Final diagnoses:  [N18.6, Z99.2] ESRD (end stage renal disease) on dialysis        ED Disposition Condition    Observation Stable                  Juan Carlos Summers, DO  11/26/23 9086

## 2023-11-26 NOTE — H&P
[facility]  [facility] Medicine  History & Physical    Patient Name: Kathie Quezada  MRN: [id number]  Patient Class: OP- Observation  Admission Date: 11/26/2023  Attending Physician: Dilia Hernandez MD   Primary Care Provider: Elan Price MD         Patient information was obtained from patient and ER records.     Subjective:     Principal Problem:Problem with vascular access    Chief Complaint:   Chief Complaint   Patient presents with    Vascular Access Problem     R upper chest dialysis came out sometime while sleeping, no drainage or swelling, last dialysis friday        HPI: Kathie Quezada is a 63 y.o. female with PMHx of ESRD on HD, CHF, HTN, HLD, T2DM, Factor XI deficiency who presents to [facility] [place] for evaluation of vascular access problem. Right upper chest permacath reportedly came out overnight. She reports oozing to site, denies associated pain. He last full HD session was two days prior. She denies associated HA, fever, chills, shortness of breath, chest pain, abdominal pain, n/v/d, urinary symptoms, numbness or tingling. Of note, she is scheduled for LUE AVG declot on 11/30 with Dr. Tavera.     In the ED: Hypertensive, otherwise VSSAF. CBC without leukocytosis. CMP consistent with ESRD. CXR without acute abnormality. Received no meds. Admitted to  for further management.     Past Medical History:   Diagnosis Date    Acute respiratory failure with hypoxia and hypercapnia 05/26/2019    ATN (acute tubular necrosis) 02/08/2019    Chronic diastolic heart failure 02/07/2019    2-8-19 Mild left atrial enlargement. Mild left ventricular enlargement. Normal left ventricular systolic function. The estimated ejection fraction is 55% Indeterminate left ventricular diastolic function. Normal right ventricular systolic function. Mild mitral regurgitation. Mild to moderate tricuspid regurgitation. The estimated PA systolic pressure is 32 mm Hg Normal central venous pressure (3 m    CKD  (chronic kidney disease) stage 4, GFR 15-29 ml/min 05/16/2018    CKD stage G3a/A3, GFR 45-59 and albumin creatinine ratio >300 mg/g 05/16/2018    CKD stage G3b/A3, GFR 30-44 and albumin creatinine ratio >300 mg/g 05/16/2018    Closed compression fracture of L2 lumbar vertebra 05/24/2019    Closed compression fracture of L2 lumbar vertebra 05/24/2019     IMO Regulatory Load October 2019    Controlled type 2 diabetes with retinopathy 04/09/2018    COVID-19 virus infection 7-1-2021 07/01/2021    CVA (cerebral vascular accident) 2017    Diabetic polyneuropathy associated with type 2 diabetes mellitus 12/04/2020    Encounter for blood transfusion     Essential hypertension 04/09/2018    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  7-31-19 Factor XI Activity 55 - 145 % 96       Gastroesophageal reflux disease without esophagitis 05/24/2019    Generalized anxiety disorder 11/08/2019    GERD (gastroesophageal reflux disease)     GIB (gastrointestinal bleeding) 10/18/2018    History of CVA (cerebrovascular accident) without residual deficits 11/27/2022    History of hepatitis C     s/p succesful Rx w/ Harvoni  - SVR12 (cure) 2016    Hypertensive retinopathy, bilateral 12/10/2019    Mild nonproliferative diabetic retinopathy 05/02/2018    Mixed hyperlipidemia 04/09/2018    Myalgia due to statin 09/07/2022    Normocytic anemia 01/14/2022    NS (nuclear sclerosis), bilateral 12/10/2019    Peanut allergy 02/04/2023    PNA (pneumonia) 05/28/2019    Postoperative hypothyroidism     Renovascular hypertension 04/09/2018    S/P kyphoplasty 08/14/2019 8/14/19: L2 kyphoplasty with Dr. Kwon     Slow transit constipation 05/27/2019    Type 2 diabetes mellitus with circulatory disorder, with long-term current use of insulin 04/09/2018    Type 2 diabetes mellitus with stage 3 chronic kidney disease, without long-term current use of insulin 04/09/2018    Type 2 diabetes mellitus with stage 3a chronic kidney disease,  without long-term current use of insulin 03/03/2021    Type 2 diabetes mellitus with stage 3b chronic kidney disease, without long-term current use of insulin 09/07/2022       Past Surgical History:   Procedure Laterality Date    AV FISTULA PLACEMENT Left 10/27/2022    Procedure: CREATION, AV FISTULA;  Surgeon: Tirso Tavera MD;  Location: [facility] OR [address] [place] [address];  Service: Peripheral Vascular;  Laterality: Left;  Brachio basilic 1st stage     BACK SURGERY  2017    CHOLECYSTECTOMY  2015    [facility]    COLONOSCOPY  11/13/2015    Dr Chino torres prep. internal hemorrhoids. diverticulosis of sigmois. NO polyps. repeat due 2025    FISTULOGRAM N/A 12/16/2022    Procedure: Fistulogram;  Surgeon: Tirso Tavera MD;  Location: [facility] CATH LAB;  Service: Peripheral Vascular;  Laterality: N/A;    FISTULOGRAM, WITH PTA Left 3/23/2023    Procedure: FISTULOGRAM, WITH PTA;  Surgeon: Tirso Tavera MD;  [facility];  Service: Peripheral Vascular;  Laterality: Left;  LUE TRANSRADIAL CO2 FISTULOGRAM    1.9 min  22.99 mGy  4.3032 Gy.cm  200cc CO2  9ml TransRadial Solution    FISTULOGRAM, WITH PTA Left 8/15/2023    Procedure: FISTULOGRAM, WITH PTA TRANSRADIAL;  Surgeon: Tirso Tavera MD;  [facility];  Service: Peripheral Vascular;  Laterality: Left;  Fluoro: 5.1 min, mGy: 7.95, Gycm2: 1.2626, contrast: 12ml    FIXATION KYPHOPLASTY Bilateral 08/14/2019    Procedure: L2 kyphoplasty Fluoro Globus;  Surgeon: Jeremie Kwon DO;  Location: [facility] OR;  Service: Neurosurgery;  Laterality: Bilateral;  GLOBUS MACRINA WINSLOW [phone] TEXTED HER @ 10:54AM ON 7-30-19  PRE-OP-BY RN  8-7-2019    HYSTERECTOMY      OOPHORECTOMY  1996    one    PERCUTANEOUS TRANSLUMINAL ANGIOPLASTY OF ARTERIOVENOUS FISTULA Left 12/16/2022    Procedure: PTA, AV FISTULA;  Surgeon: Tirso aTvera MD;  Location: [facility] CATH LAB;  Service: Peripheral Vascular;  Laterality: Left;    RIGHT HEART CATHETERIZATION Right 6/15/2023    Procedure:  INSERTION, CATHETER, RIGHT HEART;  Surgeon: Lucretia Rivas MD;  Location: [facility] CATH LAB;  Service: Cardiology;  Laterality: Right;    THYROIDECTOMY  2016    at [facility] for thyroid nodule    TONSILLECTOMY      TRANSPOSITION OF BASILIC VEIN Left 1/31/2023    Procedure: TRANSPOSITION, VEIN, BASILIC;  Surgeon: Tirso Tavera MD;  Location: [facility] OR [address] [place] [address];  Service: Peripheral Vascular;  Laterality: Left;       Review of patient's allergies indicates:   Allergen Reactions    Atorvastatin Other (See Comments)     Statin induced myalgia    Mushroom Hives    Peanut Anaphylaxis    Bactrim [sulfamethoxazole-trimethoprim] Hives    Gabapentin      Pruritis     Iodine     Peanut butter flavor     Penicillins Hives    Shellfish containing products Itching    Sulfa (sulfonamide antibiotics) Hives       No current facility-administered medications on file prior to encounter.     Current Outpatient Medications on File Prior to Encounter   Medication Sig    NIFEdipine (ADALAT CC) 60 MG TbSR Take 1 tablet by mouth daily    SYNTHROID 200 mcg tablet Take 1 tablet (200 mcg total) by mouth before breakfast.    calcium acetate,phosphat bind, (PHOSLO) 667 mg capsule Take 2 capsules by mouth 3 (three) times daily    carvediloL (COREG) 12.5 MG tablet Take 1 tablet (12.5 mg total) by mouth 2 (two) times daily with meals.    cholecalciferol, vitamin D3, 125 mcg (5,000 unit) Tab Take 1 tablet (5,000 Units total) by mouth once daily.    EScitalopram oxalate (LEXAPRO) 10 MG tablet Take 1 tablet (10 mg total) by mouth once daily. (Patient taking differently: Take 10 mg by mouth every evening.)    ferrous gluconate (FERGON) 324 MG tablet Take 1 tablet (324 mg total) by mouth daily with breakfast.    furosemide (LASIX) 40 MG tablet Take 2 tablets (80 mg total) by mouth 2 (two) times a day.    hydrALAZINE (APRESOLINE) 50 MG tablet Take 1 tablet (50 mg total) by mouth every 12 (twelve) hours.    irbesartan (AVAPRO) 300 MG tablet Take 1 tablet by  mouth daily    levocetirizine (XYZAL) 5 MG tablet Take 1 tablet (5 mg total) by mouth daily as needed for Allergies.    OPW TEST CLAIM - DO NOT FILL OPW test claim. Do not fill.    traZODone (DESYREL) 50 MG tablet Take 1 to 2 tablets ( mg total) by mouth nightly as needed for Insomnia.     Family History       Problem Relation (Age of Onset)    Aneurysm Brother    Coronary artery disease Mother    Diabetes type II Mother    Heart disease Mother    Hypertension Mother, Father    Prostate cancer Father    Stroke Mother          Tobacco Use    Smoking status: Never     Passive exposure: Past    Smokeless tobacco: Never   Substance and Sexual Activity    Alcohol use: Not Currently    Drug use: No    Sexual activity: Not Currently     Review of Systems   Constitutional:  Negative for activity change, chills and fever.   HENT:  Negative for trouble swallowing.    Eyes:  Negative for photophobia and visual disturbance.   Respiratory:  Negative for cough, chest tightness and shortness of breath.    Cardiovascular:  Negative for chest pain, palpitations and leg swelling.   Gastrointestinal:  Negative for abdominal pain, constipation, diarrhea, nausea and vomiting.   Genitourinary:  Negative for dysuria, frequency and hematuria.   Musculoskeletal:  Negative for back pain, gait problem and neck pain.   Skin:  Negative for rash and wound.   Neurological:  Negative for dizziness, syncope, speech difficulty and light-headedness.   Psychiatric/Behavioral:  Negative for agitation and confusion. The patient is not nervous/anxious.      Objective:     Vital Signs (Most Recent):  Temp: 99.4 °F (37.4 °C) (11/26/23 1009)  Pulse: 81 (11/26/23 1009)  Resp: 20 (11/26/23 1009)  BP: (!) 176/85 (11/26/23 1009)  SpO2: 97 % (11/26/23 1009) Vital Signs (24h Range):  Temp:  [99.4 °F (37.4 °C)] 99.4 °F (37.4 °C)  Pulse:  [81] 81  Resp:  [20] 20  SpO2:  [97 %] 97 %  BP: (176)/(85) 176/85     Weight: 108 kg (238 lb)  Body mass index is 34.15  kg/m².     Physical Exam  Vitals and nursing note reviewed.   Constitutional:       General: She is not in acute distress.     Appearance: She is well-developed. She is obese.   HENT:      Head: Normocephalic and atraumatic.      Mouth/Throat:      Pharynx: No oropharyngeal exudate.   Eyes:      Conjunctiva/sclera: Conjunctivae normal.      Pupils: Pupils are equal, round, and reactive to light.   Cardiovascular:      Rate and Rhythm: Normal rate and regular rhythm.      Heart sounds: Normal heart sounds.   Pulmonary:      Effort: Pulmonary effort is normal. No respiratory distress.      Breath sounds: Normal breath sounds. No wheezing.   Chest:      Comments: Dislodged permacath from right chest. Site bandaged, no bleeding/oozing.   Abdominal:      General: Bowel sounds are normal. There is no distension.      Palpations: Abdomen is soft.      Tenderness: There is no abdominal tenderness.   Musculoskeletal:         General: No tenderness. Normal range of motion.      Cervical back: Normal range of motion and neck supple.   Lymphadenopathy:      Cervical: No cervical adenopathy.   Skin:     General: Skin is warm and dry.      Capillary Refill: Capillary refill takes less than 2 seconds.      Findings: No rash.   Neurological:      Mental Status: She is alert and oriented to person, place, and time.      Cranial Nerves: No cranial nerve deficit.      Sensory: No sensory deficit.      Coordination: Coordination normal.   Psychiatric:         Behavior: Behavior normal.         Thought Content: Thought content normal.         Judgment: Judgment normal.              CRANIAL NERVES     CN III, IV, VI   Pupils are equal, round, and reactive to light.       Significant Labs: All pertinent labs within the past 24 hours have been reviewed.  CBC:   Recent Labs   Lab 11/26/23  1044   WBC 9.54   HGB 10.8*   HCT 33.3*        CMP:   Recent Labs   Lab 11/26/23  1044      K 3.5      CO2 26      BUN 42*    CREATININE 4.2*   CALCIUM 9.2   PROT 7.8   ALBUMIN 3.7   BILITOT 0.7   ALKPHOS 83   AST 13   ALT 7*   ANIONGAP 12       Significant Imaging: I have reviewed all pertinent imaging results/findings within the past 24 hours.  Imaging Results              X-Ray Chest PA And Lateral (Final result)  Result time 11/26/23 11:39:43      Final result by Dung Linder MD (11/26/23 11:39:43)                   Impression:      No acute abnormality.      Electronically signed by: Dung Linder  Date:    11/26/2023  Time:    11:39               Narrative:    EXAMINATION:  XR CHEST PA AND LATERAL    CLINICAL HISTORY:  End stage renal disease    TECHNIQUE:  PA and lateral views of the chest were performed.    COMPARISON:  Chest radiograph 08/31/2023    FINDINGS:  Lines and tubes: The previous central line has been removed.    Heart and mediastinum: Unremarkable.    Pleura: No pleural effusion or pneumothorax.    Lungs: Lungs are well inflated. No focal consolidations or evidence of pulmonary edema.  Unchanged bandlike opacity in the right midlung zone, likely atelectasis or scarring.    Soft tissue/bone: Cholecystectomy clips.  Vertebral augmentation in the upper lumbar spine.                                      Assessment/Plan:     * Problem with vascular access  ESRD on dialysis  - HD MWF, last session 11/24  - dislodged L chest permacath upon waking up this AM  - Nephro consulted for HD needs  - Interventional Nephrology consulted for temporary access  - sees Dr. Tavera (Vascular) - scheduled 11/30 for declot of LUE AVG  - daily RFP  - cont tele    Metabolic bone disease  - cont phos binder    Iron deficiency anemia  - Hgb 10.8 on admission, near baseline  - cont iron supplementation  - daily CBC  - transfuse if Hgb < 7    Type 2 diabetes mellitus with stage 5 chronic kidney disease not on chronic dialysis, without long-term current use of insulin  - diet controlled  - LDSSI  - accuchecks    Generalized anxiety disorder  -  cont lexapro    Factor XI deficiency  - hx noted    Chronic diastolic heart failure  - hx noted, no acute exacerbation   - HD for volume management   - monitor for signs of overload  - home irbesartan > losartan during admission  - cont tele    Mixed hyperlipidemia  - last lipid panel reviewed  - not on home statin    Postoperative hypothyroidism  - cont home synthroid      VTE Risk Mitigation (From admission, onward)           Ordered     heparin (porcine) injection 5,000 Units  Every 8 hours         11/26/23 1217     IP VTE HIGH RISK PATIENT  Once         11/26/23 1217     Place sequential compression device  Until discontinued         11/26/23 1217     IP VTE HIGH RISK PATIENT  Once         11/26/23 1217     Place sequential compression device  Until discontinued         11/26/23 1217                         On 11/26/2023, patient should be placed in hospital observation services under my care in collaboration with Dilia Hernandez MD.           JUDE GriffithC  Department of Hospital Medicine  [facility] - Emergency Dept

## 2023-11-26 NOTE — NURSING
Nurses Note -- 4 Eyes      11/26/2023   3:25 PM      Skin assessed during: Admit      [x] No Altered Skin Integrity Present    [x]Prevention Measures Documented      [] Yes- Altered Skin Integrity Present or Discovered   [] LDA Added if Not in Epic (Describe Wound)   [] New Altered Skin Integrity was Present on Admit and Documented in LDA   [] Wound Image Taken    Wound Care Consulted? No    Attending Nurse:  Alie Stratton RN/Staff Member:   Kisha

## 2023-11-26 NOTE — PLAN OF CARE
Problem: Adult Inpatient Plan of Care  Goal: Plan of Care Review  Outcome: Ongoing, Progressing     Problem: Adult Inpatient Plan of Care  Goal: Absence of Hospital-Acquired Illness or Injury  Outcome: Ongoing, Progressing     Problem: Adult Inpatient Plan of Care  Goal: Optimal Comfort and Wellbeing  Outcome: Ongoing, Progressing     Problem: Adult Inpatient Plan of Care  Goal: Readiness for Transition of Care  Outcome: Ongoing, Progressing     Problem: Infection  Goal: Absence of Infection Signs and Symptoms  Outcome: Ongoing, Progressing     Problem: Fall Injury Risk  Goal: Absence of Fall and Fall-Related Injury  Outcome: Ongoing, Progressing

## 2023-11-26 NOTE — ASSESSMENT & PLAN NOTE
- hx noted, no acute exacerbation   - HD for volume management   - monitor for signs of overload  - home irbesartan > losartan during admission  - cont tele

## 2023-11-26 NOTE — ASSESSMENT & PLAN NOTE
- Hgb 10.8 on admission, near baseline  - cont iron supplementation  - daily CBC  - transfuse if Hgb < 7

## 2023-11-26 NOTE — SUBJECTIVE & OBJECTIVE
Past Medical History:   Diagnosis Date    Acute respiratory failure with hypoxia and hypercapnia 05/26/2019    ATN (acute tubular necrosis) 02/08/2019    Chronic diastolic heart failure 02/07/2019    2-8-19 Mild left atrial enlargement. Mild left ventricular enlargement. Normal left ventricular systolic function. The estimated ejection fraction is 55% Indeterminate left ventricular diastolic function. Normal right ventricular systolic function. Mild mitral regurgitation. Mild to moderate tricuspid regurgitation. The estimated PA systolic pressure is 32 mm Hg Normal central venous pressure (3 m    CKD (chronic kidney disease) stage 4, GFR 15-29 ml/min 05/16/2018    CKD stage G3a/A3, GFR 45-59 and albumin creatinine ratio >300 mg/g 05/16/2018    CKD stage G3b/A3, GFR 30-44 and albumin creatinine ratio >300 mg/g 05/16/2018    Closed compression fracture of L2 lumbar vertebra 05/24/2019    Closed compression fracture of L2 lumbar vertebra 05/24/2019     IMO Regulatory Load October 2019    Controlled type 2 diabetes with retinopathy 04/09/2018    COVID-19 virus infection 7-1-2021 07/01/2021    CVA (cerebral vascular accident) 2017    Diabetic polyneuropathy associated with type 2 diabetes mellitus 12/04/2020    Encounter for blood transfusion     Essential hypertension 04/09/2018    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  7-31-19 Factor XI Activity 55 - 145 % 96       Gastroesophageal reflux disease without esophagitis 05/24/2019    Generalized anxiety disorder 11/08/2019    GERD (gastroesophageal reflux disease)     GIB (gastrointestinal bleeding) 10/18/2018    History of CVA (cerebrovascular accident) without residual deficits 11/27/2022    History of hepatitis C     s/p succesful Rx w/ [facility]  - SVR12 (cure) 2016    Hypertensive retinopathy, bilateral 12/10/2019    Mild nonproliferative diabetic retinopathy 05/02/2018    Mixed hyperlipidemia 04/09/2018    Myalgia due to statin 09/07/2022     Normocytic anemia 01/14/2022    NS (nuclear sclerosis), bilateral 12/10/2019    Peanut allergy 02/04/2023    PNA (pneumonia) 05/28/2019    Postoperative hypothyroidism     Renovascular hypertension 04/09/2018    S/P kyphoplasty 08/14/2019 8/14/19: L2 kyphoplasty with Dr. Kwon     Slow transit constipation 05/27/2019    Type 2 diabetes mellitus with circulatory disorder, with long-term current use of insulin 04/09/2018    Type 2 diabetes mellitus with stage 3 chronic kidney disease, without long-term current use of insulin 04/09/2018    Type 2 diabetes mellitus with stage 3a chronic kidney disease, without long-term current use of insulin 03/03/2021    Type 2 diabetes mellitus with stage 3b chronic kidney disease, without long-term current use of insulin 09/07/2022       Past Surgical History:   Procedure Laterality Date    AV FISTULA PLACEMENT Left 10/27/2022    Procedure: CREATION, AV FISTULA;  Surgeon: Tirso Tavera MD;  Location: [facility] OR [address] [place] [address];  Service: Peripheral Vascular;  Laterality: Left;  Brachio basilic 1st stage     BACK SURGERY  2017    CHOLECYSTECTOMY  2015    [facility]    COLONOSCOPY  11/13/2015    Dr Chino torres prep. internal hemorrhoids. diverticulosis of sigmois. NO polyps. repeat due 2025    FISTULOGRAM N/A 12/16/2022    Procedure: Fistulogram;  Surgeon: Tirso Tavera MD;  Location: [facility] CATH LAB;  Service: Peripheral Vascular;  Laterality: N/A;    FISTULOGRAM, WITH PTA Left 3/23/2023    Procedure: FISTULOGRAM, WITH PTA;  Surgeon: Tirso Tavera MD;  [facility];  Service: Peripheral Vascular;  Laterality: Left;  LUE TRANSRADIAL CO2 FISTULOGRAM    1.9 min  22.99 mGy  4.3032 Gy.cm  200cc CO2  9ml TransRadial Solution    FISTULOGRAM, WITH PTA Left 8/15/2023    Procedure: FISTULOGRAM, WITH PTA TRANSRADIAL;  Surgeon: Tirso Tavera MD;  [facility];  Service: Peripheral Vascular;  Laterality: Left;  Fluoro: 5.1 min, mGy: 7.95, Gycm2: 1.2626, contrast:  12ml    FIXATION KYPHOPLASTY Bilateral 08/14/2019    Procedure: L2 kyphoplasty Fluoro Globus;  Surgeon: Jeremie Kwon DO;  Location: [facility] OR;  Service: Neurosurgery;  Laterality: Bilateral;  CROW [place] [phone] TEXTED HER @ 10:54AM ON 7-30-19  PRE-OP-BY RN  8-7-2019    HYSTERECTOMY      OOPHORECTOMY  1996    one    PERCUTANEOUS TRANSLUMINAL ANGIOPLASTY OF ARTERIOVENOUS FISTULA Left 12/16/2022    Procedure: PTA, AV FISTULA;  Surgeon: Tirso Tavera MD;  Location: [facility] CATH LAB;  Service: Peripheral Vascular;  Laterality: Left;    RIGHT HEART CATHETERIZATION Right 6/15/2023    Procedure: INSERTION, CATHETER, RIGHT HEART;  Surgeon: Lucretia Rivas MD;  Location: [facility] CATH LAB;  Service: Cardiology;  Laterality: Right;    TH[place]ROIDECT[place]Y  [place] nod[place]    TONSILLECTOMY      TRANSPOSITION OF BASILIC VEIN Left 1/31/2023    Procedure: TRANSPOSITION, VEIN, BASILIC;  Surgeon: Tirso Tavera MD;  Location: [address];  Service: Peripheral Vascular;  Laterality: Left;       Review of patient's allergies indicates:   Allergen Reactions    Atorvastatin Other (See Comments)     Statin induced myalgia    Mushroom Hives    Peanut Anaphylaxis    Bactrim [sulfamethoxazole-trimethoprim] Hives    Gabapentin      Pruritis     Iodine     Peanut butter flavor     Penicillins Hives    Shellfish containing products Itching    Sulfa (sulfonamide antibiotics) Hives       No current facility-administered medications on file prior to encounter.     Current Outpatient Medications on File Prior to Encounter   Medication Sig    NIFEdipine (ADALAT CC) 60 MG TbSR Take 1 tablet by mouth daily    SYNTHROID 200 mcg tablet Take 1 tablet (200 mcg total) by mouth before breakfast.    calcium acetate,phosphat bind, (PHOSLO) 667 mg capsule Take 2 capsules by mouth 3 (three) times daily    carvediloL (COREG) 12.5 MG tablet Take 1 tablet (12.5 mg total) by mouth 2 (two) times daily with meals.    cholecalciferol,  vitamin D3, 125 mcg (5,000 unit) Tab Take 1 tablet (5,000 Units total) by mouth once daily.    EScitalopram oxalate (LEXAPRO) 10 MG tablet Take 1 tablet (10 mg total) by mouth once daily. (Patient taking differently: Take 10 mg by mouth every evening.)    ferrous gluconate (FERGON) 324 MG tablet Take 1 tablet (324 mg total) by mouth daily with breakfast.    furosemide (LASIX) 40 MG tablet Take 2 tablets (80 mg total) by mouth 2 (two) times a day.    hydrALAZINE (APRESOLINE) 50 MG tablet Take 1 tablet (50 mg total) by mouth every 12 (twelve) hours.    irbesartan (AVAPRO) 300 MG tablet Take 1 tablet by mouth daily    levocetirizine (XYZAL) 5 MG tablet Take 1 tablet (5 mg total) by mouth daily as needed for Allergies.    OPW TEST CLAIM - DO NOT FILL OPW test claim. Do not fill.    traZODone (DESYREL) 50 MG tablet Take 1 to 2 tablets ( mg total) by mouth nightly as needed for Insomnia.     Family History       Problem Relation (Age of Onset)    Aneurysm Brother    Coronary artery disease Mother    Diabetes type II Mother    Heart disease Mother    Hypertension Mother, Father    Prostate cancer Father    Stroke Mother          Tobacco Use    Smoking status: Never     Passive exposure: Past    Smokeless tobacco: Never   Substance and Sexual Activity    Alcohol use: Not Currently    Drug use: No    Sexual activity: Not Currently     Review of Systems   Constitutional:  Negative for activity change, chills and fever.   HENT:  Negative for trouble swallowing.    Eyes:  Negative for photophobia and visual disturbance.   Respiratory:  Negative for cough, chest tightness and shortness of breath.    Cardiovascular:  Negative for chest pain, palpitations and leg swelling.   Gastrointestinal:  Negative for abdominal pain, constipation, diarrhea, nausea and vomiting.   Genitourinary:  Negative for dysuria, frequency and hematuria.   Musculoskeletal:  Negative for back pain, gait problem and neck pain.   Skin:  Negative for  rash and wound.   Neurological:  Negative for dizziness, syncope, speech difficulty and light-headedness.   Psychiatric/Behavioral:  Negative for agitation and confusion. The patient is not nervous/anxious.      Objective:     Vital Signs (Most Recent):  Temp: 99.4 °F (37.4 °C) (11/26/23 1009)  Pulse: 81 (11/26/23 1009)  Resp: 20 (11/26/23 1009)  BP: (!) 176/85 (11/26/23 1009)  SpO2: 97 % (11/26/23 1009) Vital Signs (24h Range):  Temp:  [99.4 °F (37.4 °C)] 99.4 °F (37.4 °C)  Pulse:  [81] 81  Resp:  [20] 20  SpO2:  [97 %] 97 %  BP: (176)/(85) 176/85     Weight: 108 kg (238 lb)  Body mass index is 34.15 kg/m².     Physical Exam  Vitals and nursing note reviewed.   Constitutional:       General: She is not in acute distress.     Appearance: She is well-developed. She is obese.   HENT:      Head: Normocephalic and atraumatic.      Mouth/Throat:      Pharynx: No oropharyngeal exudate.   Eyes:      Conjunctiva/sclera: Conjunctivae normal.      Pupils: Pupils are equal, round, and reactive to light.   Cardiovascular:      Rate and Rhythm: Normal rate and regular rhythm.      Heart sounds: Normal heart sounds.   Pulmonary:      Effort: Pulmonary effort is normal. No respiratory distress.      Breath sounds: Normal breath sounds. No wheezing.   Chest:      Comments: Dislodged permacath from right chest. Site bandaged, no bleeding/oozing.   Abdominal:      General: Bowel sounds are normal. There is no distension.      Palpations: Abdomen is soft.      Tenderness: There is no abdominal tenderness.   Musculoskeletal:         General: No tenderness. Normal range of motion.      Cervical back: Normal range of motion and neck supple.   Lymphadenopathy:      Cervical: No cervical adenopathy.   Skin:     General: Skin is warm and dry.      Capillary Refill: Capillary refill takes less than 2 seconds.      Findings: No rash.   Neurological:      Mental Status: She is alert and oriented to person, place, and time.      Cranial Nerves:  No cranial nerve deficit.      Sensory: No sensory deficit.      Coordination: Coordination normal.   Psychiatric:         Behavior: Behavior normal.         Thought Content: Thought content normal.         Judgment: Judgment normal.              CRANIAL NERVES     CN III, IV, VI   Pupils are equal, round, and reactive to light.       Significant Labs: All pertinent labs within the past 24 hours have been reviewed.  CBC:   Recent Labs   Lab 11/26/23  1044   WBC 9.54   HGB 10.8*   HCT 33.3*        CMP:   Recent Labs   Lab 11/26/23  1044      K 3.5      CO2 26      BUN 42*   CREATININE 4.2*   CALCIUM 9.2   PROT 7.8   ALBUMIN 3.7   BILITOT 0.7   ALKPHOS 83   AST 13   ALT 7*   ANIONGAP 12       Significant Imaging: I have reviewed all pertinent imaging results/findings within the past 24 hours.  Imaging Results              X-Ray Chest PA And Lateral (Final result)  Result time 11/26/23 11:39:43      Final result by Dung Linder MD (11/26/23 11:39:43)                   Impression:      No acute abnormality.      Electronically signed by: Dung Linder  Date:    11/26/2023  Time:    11:39               Narrative:    EXAMINATION:  XR CHEST PA AND LATERAL    CLINICAL HISTORY:  End stage renal disease    TECHNIQUE:  PA and lateral views of the chest were performed.    COMPARISON:  Chest radiograph 08/31/2023    FINDINGS:  Lines and tubes: The previous central line has been removed.    Heart and mediastinum: Unremarkable.    Pleura: No pleural effusion or pneumothorax.    Lungs: Lungs are well inflated. No focal consolidations or evidence of pulmonary edema.  Unchanged bandlike opacity in the right midlung zone, likely atelectasis or scarring.    Soft tissue/bone: Cholecystectomy clips.  Vertebral augmentation in the upper lumbar spine.

## 2023-11-26 NOTE — ED NOTES
Patient identifiers verified and correct for Kathie Holguin  LOC: The patient is awake, alert and aware of environment with an appropriate affect, the patient is oriented x 3 and speaking appropriately.   APPEARANCE: Patient appears comfortable and in no acute distress, patient is clean and well groomed.  SKIN: The skin is warm and dry, color consistent with ethnicity, patient has normal skin turgor and moist mucus membranes, skin intact, no breakdown or bruising noted.   MUSCULOSKELETAL: Patient moving all extremities spontaneously, no swelling noted.  RESPIRATORY: Airway is open and patent, respirations are spontaneous, patient has a normal effort and rate, no accessory muscle use noted, O2 Sat 97% on room air.  CARDIAC: Patient has a normal rate and regular rhythm, no edema noted, capillary refill < 3 seconds. Pt has dislodged dialysis shunt  GASTRO: Soft and non tender to palpation, no distention noted, Pt states bowel movements have been regular.  : Pt denies any pain or frequency with urination.  NEURO: Pt opens eyes spontaneously, behavior appropriate to situation, follows commands, facial expression symmetrical, bilateral hand grasp equal and even, purposeful motor response noted, normal sensation in all extremities when touched with a finger.

## 2023-11-26 NOTE — ED TRIAGE NOTES
Vascular Access Problem (R upper chest dialysis came out sometime while sleeping, no drainage or swelling, last dialysis friday)

## 2023-11-26 NOTE — PHARMACY MED REC
"Admission Medication History     The home medication history was taken by Terry Gruber.    You may go to "Admission" then "Reconcile Home Medications" tabs to review and/or act upon these items.     The home medication list has been updated by the Pharmacy department.   Please read ALL comments highlighted in yellow.   Please address this information as you see fit.    Feel free to contact us if you have any questions or require assistance.      The medications listed below were removed from the home medication list. Please reorder if appropriate:  Patient reports no longer taking the following medication(s):  CARVEDILOL 12.5 MG TABLET  FUROSEMIDE 40 MG TABLET      Medications listed below were obtained from: Patient/family and Analytic software- [employer]  Current Outpatient Medications on File Prior to Encounter   Medication Sig    calcium acetate,phosphat bind, (PHOSLO) 667 mg capsule   Take 2 capsules by mouth 3 (three) times daily    cholecalciferol, vitamin D3, 125 mcg (5,000 unit) Tab   Take 1 tablet (5,000 Units total) by mouth once daily.    EScitalopram oxalate (LEXAPRO) 10 MG tablet   Take 10 mg by mouth every evening.    ferrous gluconate (FERGON) 324 MG tablet   Take 1 tablet (324 mg total) by mouth daily with breakfast.        hydrALAZINE (APRESOLINE) 50 MG tablet   Take 1 tablet (50 mg total) by mouth every 12 (twelve) hours.        irbesartan (AVAPRO) 300 MG tablet Take 1 tablet by mouth once daily        levocetirizine (XYZAL) 5 MG tablet Take 1 tablet (5 mg total) by mouth daily as needed for allergies.        NIFEdipine (ADALAT CC) 60 MG TbSR Take 1 tablet by mouth once daily.        SYNTHROID 200 mcg tablet Take 1 tablet (200 mcg total) by mouth before breakfast.        traZODone (DESYREL) 50 MG tablet Take 1 to 2 tablets ( mg total) by mouth nightly as needed for insomnia.        OPW TEST CLAIM - DO NOT FILL OPW test claim. Do not fill.             Potential issues to be addressed PRIOR TO " DISCHARGE  Please discuss with the patient barriers to adherence with medication treatment plans  Patient requires education regarding drug therapies     Terry Gruber  EXT [phone]                  .

## 2023-11-26 NOTE — ED NOTES
--160  -metoprolol 25 mg bid  -captopril 25 mg tid  -PRN Labetalol & Hydralazine   -Echo 1/26:1 - Normal left ventricular systolic function (EF 65-70%).     2 - Concentric remodeling.     3 - No wall motion abnormalities.     4 - Normal left ventricular diastolic function.     5 - Normal right ventricular systolic function .       Pt placed on tele box # [id number]. War room was called.

## 2023-11-26 NOTE — ASSESSMENT & PLAN NOTE
ESRD on dialysis  - HD MWF, last session 11/24  - dislodged L chest permacath upon waking up this AM  - Nephro consulted for HD needs  - Interventional Nephrology consulted for temporary access  - sees Dr. Tavera (Vascular) - scheduled 11/30 for declot of LUE AVG  - daily RFP  - cont tele

## 2023-11-27 LAB
ALBUMIN SERPL BCP-MCNC: 3.1 G/DL (ref 3.5–5.2)
ALP SERPL-CCNC: 84 U/L (ref 55–135)
ALT SERPL W/O P-5'-P-CCNC: 8 U/L (ref 10–44)
ANION GAP SERPL CALC-SCNC: 9 MMOL/L (ref 8–16)
AST SERPL-CCNC: 14 U/L (ref 10–40)
BASOPHILS # BLD AUTO: 0.04 K/UL (ref 0–0.2)
BASOPHILS NFR BLD: 0.4 % (ref 0–1.9)
BILIRUB SERPL-MCNC: 0.4 MG/DL (ref 0.1–1)
BUN SERPL-MCNC: 45 MG/DL (ref 8–23)
CALCIUM SERPL-MCNC: 8.6 MG/DL (ref 8.7–10.5)
CHLORIDE SERPL-SCNC: 107 MMOL/L (ref 95–110)
CO2 SERPL-SCNC: 28 MMOL/L (ref 23–29)
CREAT SERPL-MCNC: 4.2 MG/DL (ref 0.5–1.4)
DIFFERENTIAL METHOD: ABNORMAL
EOSINOPHIL # BLD AUTO: 0.2 K/UL (ref 0–0.5)
EOSINOPHIL NFR BLD: 1.7 % (ref 0–8)
ERYTHROCYTE [DISTWIDTH] IN BLOOD BY AUTOMATED COUNT: 13.3 % (ref 11.5–14.5)
EST. GFR  (NO RACE VARIABLE): 11.5 ML/MIN/1.73 M^2
GLUCOSE SERPL-MCNC: 99 MG/DL (ref 70–110)
HCT VFR BLD AUTO: 31.6 % (ref 37–48.5)
HGB BLD-MCNC: 10.4 G/DL (ref 12–16)
IMM GRANULOCYTES # BLD AUTO: 0.03 K/UL (ref 0–0.04)
IMM GRANULOCYTES NFR BLD AUTO: 0.3 % (ref 0–0.5)
LYMPHOCYTES # BLD AUTO: 1.9 K/UL (ref 1–4.8)
LYMPHOCYTES NFR BLD: 20.5 % (ref 18–48)
MAGNESIUM SERPL-MCNC: 2 MG/DL (ref 1.6–2.6)
MCH RBC QN AUTO: 28 PG (ref 27–31)
MCHC RBC AUTO-ENTMCNC: 32.9 G/DL (ref 32–36)
MCV RBC AUTO: 85 FL (ref 82–98)
MONOCYTES # BLD AUTO: 0.6 K/UL (ref 0.3–1)
MONOCYTES NFR BLD: 6.2 % (ref 4–15)
NEUTROPHILS # BLD AUTO: 6.5 K/UL (ref 1.8–7.7)
NEUTROPHILS NFR BLD: 70.9 % (ref 38–73)
NRBC BLD-RTO: 0 /100 WBC
PHOSPHATE SERPL-MCNC: 4.1 MG/DL (ref 2.7–4.5)
PLATELET # BLD AUTO: 139 K/UL (ref 150–450)
PMV BLD AUTO: 10.8 FL (ref 9.2–12.9)
POCT GLUCOSE: 137 MG/DL (ref 70–110)
POTASSIUM SERPL-SCNC: 4 MMOL/L (ref 3.5–5.1)
PROT SERPL-MCNC: 6.6 G/DL (ref 6–8.4)
RBC # BLD AUTO: 3.71 M/UL (ref 4–5.4)
SODIUM SERPL-SCNC: 144 MMOL/L (ref 136–145)
WBC # BLD AUTO: 9.16 K/UL (ref 3.9–12.7)

## 2023-11-27 PROCEDURE — 99214 OFFICE O/P EST MOD 30 MIN: CPT | Mod: HCNC,NTX,, | Performed by: NURSE PRACTITIONER

## 2023-11-27 PROCEDURE — 25000003 PHARM REV CODE 250: Mod: HCNC,NTX

## 2023-11-27 PROCEDURE — 99214 PR OFFICE/OUTPT VISIT, EST, LEVL IV, 30-39 MIN: ICD-10-PCS | Mod: HCNC,NTX,, | Performed by: NURSE PRACTITIONER

## 2023-11-27 PROCEDURE — 94761 N-INVAS EAR/PLS OXIMETRY MLT: CPT | Mod: HCNC,NTX

## 2023-11-27 PROCEDURE — 84100 ASSAY OF PHOSPHORUS: CPT | Mod: HCNC,NTX

## 2023-11-27 PROCEDURE — 99223 1ST HOSP IP/OBS HIGH 75: CPT | Mod: HCNC,NTX,, | Performed by: PHYSICIAN ASSISTANT

## 2023-11-27 PROCEDURE — 36415 COLL VENOUS BLD VENIPUNCTURE: CPT | Mod: HCNC,NTX

## 2023-11-27 PROCEDURE — 99900035 HC TECH TIME PER 15 MIN (STAT): Mod: HCNC,NTX

## 2023-11-27 PROCEDURE — 83735 ASSAY OF MAGNESIUM: CPT | Mod: HCNC,NTX

## 2023-11-27 PROCEDURE — G0378 HOSPITAL OBSERVATION PER HR: HCPCS | Mod: HCNC,NTX

## 2023-11-27 PROCEDURE — 80053 COMPREHEN METABOLIC PANEL: CPT | Mod: HCNC,NTX

## 2023-11-27 PROCEDURE — 85025 COMPLETE CBC W/AUTO DIFF WBC: CPT | Mod: HCNC,NTX

## 2023-11-27 PROCEDURE — 99223 PR INITIAL HOSPITAL CARE,LEVL III: ICD-10-PCS | Mod: HCNC,NTX,, | Performed by: PHYSICIAN ASSISTANT

## 2023-11-27 RX ADMIN — HYDRALAZINE HYDROCHLORIDE 50 MG: 50 TABLET ORAL at 10:11

## 2023-11-27 RX ADMIN — CALCIUM ACETATE 1334 MG: 667 CAPSULE ORAL at 08:11

## 2023-11-27 RX ADMIN — Medication 324 MG: at 08:11

## 2023-11-27 RX ADMIN — CHOLECALCIFEROL TAB 125 MCG (5000 UNIT) 5000 UNITS: 125 TAB at 08:11

## 2023-11-27 RX ADMIN — CALCIUM ACETATE 1334 MG: 667 CAPSULE ORAL at 05:11

## 2023-11-27 RX ADMIN — HYDRALAZINE HYDROCHLORIDE 50 MG: 50 TABLET ORAL at 08:11

## 2023-11-27 RX ADMIN — LOSARTAN POTASSIUM 100 MG: 50 TABLET, FILM COATED ORAL at 08:11

## 2023-11-27 RX ADMIN — LEVOTHYROXINE SODIUM 200 MCG: 100 TABLET ORAL at 05:11

## 2023-11-27 RX ADMIN — ESCITALOPRAM OXALATE 10 MG: 10 TABLET ORAL at 10:11

## 2023-11-27 RX ADMIN — CALCIUM ACETATE 1334 MG: 667 CAPSULE ORAL at 12:11

## 2023-11-27 RX ADMIN — NIFEDIPINE 60 MG: 60 TABLET, FILM COATED, EXTENDED RELEASE ORAL at 08:11

## 2023-11-27 NOTE — CONSULTS
Tunneled Dialysis Catheter Placement Consult Note  Interventional Radiology    Consult Requested By: Stepan Nuñez PA-C  Reason for Consult: dislodged permacath yesterday morning, needs tunnel line for HD    SUBJECTIVE:     Chief Complaint:  needs HD access    History of Present Illness:  Kathie Quezada is a 63 y.o. female with a PMHx of ESRD on HD, CHF, HTN, HLD, T2DM, Factor XI deficiency who was admitted on 11/26/23 after her R IJ TDC fell out. Interventional Radiology has been consulted for TDC placement for HD access. Pt reports her TDC fell out overnight on 11/25/23. She last received HD via the TDC on 11/23/23. The TDC was placed on 8/17/23. She also has a LUE AVG which is currently clotted. She is scheduled to undergo declot [facility] vascular surgery on 11/30/23. Per , nephrology requesting tunneled HD catheter placement as opposed to a temporary, non tunneled line because the pt's AVG has required multiple declots in the past. WBC is stable at 9.The pt is hemodynamically stable and afebrile.     Scheduled Meds:   calcium acetate(phosphat bind)  1,334 mg Oral TID WM    cholecalciferol (vitamin D3)  5,000 Units Oral Daily    EScitalopram oxalate  10 mg Oral QHS    ferrous gluconate  324 mg Oral Daily with breakfast    heparin (porcine)  5,000 Units Subcutaneous Q8H    hydrALAZINE  50 mg Oral Q12H    levothyroxine  200 mcg Oral Before breakfast    losartan  100 mg Oral Daily    NIFEdipine  60 mg Oral Daily     Continuous Infusions:  PRN Meds:acetaminophen, albuterol-ipratropium, dextrose 10%, dextrose 10%, glucagon (human recombinant), glucose, glucose, insulin aspart U-100, melatonin, naloxone, ondansetron, polyethylene glycol, promethazine, sodium chloride 0.9%, sodium chloride 0.9%, traZODone    Review of patient's allergies indicates:   Allergen Reactions    Atorvastatin Other (See Comments)     Statin induced myalgia    Mushroom Hives    Peanut Anaphylaxis    Bactrim  [sulfamethoxazole-trimethoprim] Hives    Gabapentin      Pruritis     Iodine     Peanut butter flavor     Penicillins Hives    Shellfish containing products Itching    Sulfa (sulfonamide antibiotics) Hives       Past Medical History:   Diagnosis Date    Acute respiratory failure with hypoxia and hypercapnia 05/26/2019    ATN (acute tubular necrosis) 02/08/2019    Chronic diastolic heart failure 02/07/2019    2-8-19 Mild left atrial enlargement. Mild left ventricular enlargement. Normal left ventricular systolic function. The estimated ejection fraction is 55% Indeterminate left ventricular diastolic function. Normal right ventricular systolic function. Mild mitral regurgitation. Mild to moderate tricuspid regurgitation. The estimated PA systolic pressure is 32 mm Hg Normal central venous pressure (3 m    CKD (chronic kidney disease) stage 4, GFR 15-29 ml/min 05/16/2018    CKD stage G3a/A3, GFR 45-59 and albumin creatinine ratio >300 mg/g 05/16/2018    CKD stage G3b/A3, GFR 30-44 and albumin creatinine ratio >300 mg/g 05/16/2018    Closed compression fracture of L2 lumbar vertebra 05/24/2019    Closed compression fracture of L2 lumbar vertebra 05/24/2019     IMO Regulatory Load October 2019    Controlled type 2 diabetes with retinopathy 04/09/2018    COVID-19 virus infection 7-1-2021 07/01/2021    CVA (cerebral vascular accident) 2017    Diabetic polyneuropathy associated with type 2 diabetes mellitus 12/04/2020    Encounter for blood transfusion     Essential hypertension 04/09/2018    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  7-31-19 Factor XI Activity 55 - 145 % 96       Gastroesophageal reflux disease without esophagitis 05/24/2019    Generalized anxiety disorder 11/08/2019    GERD (gastroesophageal reflux disease)     GIB (gastrointestinal bleeding) 10/18/2018    History of CVA (cerebrovascular accident) without residual deficits 11/27/2022    History of hepatitis C     s/p succesful Rx w/  Harvoni  - SVR12 (cure) 2016    Hypertensive retinopathy, bilateral 12/10/2019    Mild nonproliferative diabetic retinopathy 05/02/2018    Mixed hyperlipidemia 04/09/2018    Myalgia due to statin 09/07/2022    Normocytic anemia 01/14/2022    NS (nuclear sclerosis), bilateral 12/10/2019    Peanut allergy 02/04/2023    PNA (pneumonia) 05/28/2019    Postoperative hypothyroidism     Renovascular hypertension 04/09/2018    S/P kyphoplasty 08/14/2019 8/14/19: L2 kyphoplasty with Dr. Kwon     Slow transit constipation 05/27/2019    Type 2 diabetes mellitus with circulatory disorder, with long-term current use of insulin 04/09/2018    Type 2 diabetes mellitus with stage 3 chronic kidney disease, without long-term current use of insulin 04/09/2018    Type 2 diabetes mellitus with stage 3a chronic kidney disease, without long-term current use of insulin 03/03/2021    Type 2 diabetes mellitus with stage 3b chronic kidney disease, without long-term current use of insulin 09/07/2022     Past Surgical History:   Procedure Laterality Date    AV FISTULA PLACEMENT Left 10/27/2022    Procedure: CREATION, AV FISTULA;  Surgeon: Tirso Tavera MD;  [facility];  Service: Peripheral Vascular;  Laterality: Left;  Brachio basilic 1st stage     BACK SURGERY  2017    CHOLECYSTECTOMY  2015    [facility]    COLONOSCOPY  11/13/2015    Dr Chino torres prep. internal hemorrhoids. diverticulosis of sigmois. NO polyps. repeat due 2025    FISTULOGRAM N/A 12/16/2022    Procedure: Fistulogram;  Surgeon: Tirso Tavera MD;  Location: [facility] CATH LAB;  Service: Peripheral Vascular;  Laterality: N/A;    FISTULOGRAM, WITH PTA Left 3/23/2023    Procedure: FISTULOGRAM, WITH PTA;  Surgeon: Tirso Tavera MD;  Location: [facility] OR [facility] FLR;  Service: Peripheral Vascular;  Laterality: Left;  LUE TRANSRADIAL CO2 FISTULOGRAM    1.9 min  22.99 mGy  4.3032 Gy.cm  200cc CO2  9ml TransRadial Solution    FISTULOGRAM, WITH PTA Left 8/15/2023     Procedure: FISTULOGRAM, WITH PTA TRANSRADIAL;  Surgeon: Tirso Tavera MD;  Location: [address] FLR;  Service: Peripheral Vascular;  Laterality: Left;  Fluoro: 5.1 min, mGy: 7.95, Gycm2: 1.2626, contrast: 12ml    FIXATION KYPHOPLASTY Bilateral 08/14/2019    Procedure: L2 kyphoplasty Fluoro Globus;  Surgeon: Jeremie Kwon DO;  Location: [facility] OR;  Service: Neurosurgery;  Laterality: Bilateral;  GLOBUS MACRINA WINSLOW [phone] TEXTED HER @ 10:54AM ON 7-30-19  PRE-OP-BY RN  8-7-2019    HYSTERECTOMY      OOPHORECTOMY  1996    one    PERCUTANEOUS TRANSLUMINAL ANGIOPLASTY OF ARTERIOVENOUS FISTULA Left 12/16/2022    Procedure: PTA, AV FISTULA;  Surgeon: Tirso Tavera MD;  Location: [facility] CATH LAB;  Service: Peripheral Vascular;  Laterality: Left;    RIGHT HEART CATHETERIZATION Right 6/15/2023    Procedure: INSERTION, CATHETER, RIGHT HEART;  Surgeon: Lucretia Rivas MD;  Location: [facility] CATH LAB;  Service: Cardiology;  Laterality: Right;    THYROIDECTOMY  2016    at [place] for thyroid nodule    TONSILLECTOMY      TRANSPOSITION OF BASILIC VEIN Left 1/31/2023    Procedure: TRANSPOSITION, VEIN, BASILIC;  Surgeon: Tirso Tavera MD;  [address]  Service: Peripheral Vascular;  Laterality: Left;     Family History   Problem Relation Age of Onset    Stroke Mother     Heart disease Mother     Hypertension Mother     Diabetes type II Mother     Coronary artery disease Mother     Hypertension Father     Prostate cancer Father     Aneurysm Brother      Social History     Tobacco Use    Smoking status: Never     Passive exposure: Past    Smokeless tobacco: Never   Substance Use Topics    Alcohol use: Not Currently    Drug use: No       OBJECTIVE:     Vital Signs (Most Recent)  Temp: 98.2 °F (36.8 °C) (11/27/23 0850)  Pulse: 80 (11/27/23 1346)  Resp: 17 (11/27/23 1346)  BP: 127/67 (11/27/23 1346)  SpO2: 99 % (11/27/23 1346)    Physical Exam:  Physical Exam  Vitals and nursing note reviewed.   Constitutional:        "General: She is not in acute distress.     Appearance: She is obese. She is not ill-appearing.   HENT:      Head: Normocephalic and atraumatic.   Eyes:      Extraocular Movements: Extraocular movements intact.      Conjunctiva/sclera: Conjunctivae normal.      Pupils: Pupils are equal, round, and reactive to light.   Pulmonary:      Effort: Pulmonary effort is normal. No respiratory distress.   Abdominal:      General: Abdomen is flat.   Musculoskeletal:      Comments: R upper chest HD catheter site; appears well healed, no purulence, oozing, edema or erythema   Skin:     General: Skin is warm and dry.      Coloration: Skin is not jaundiced.   Neurological:      General: No focal deficit present.      Mental Status: She is alert and oriented to person, place, and time.   Psychiatric:         Mood and Affect: Mood normal.         Behavior: Behavior normal.         Thought Content: Thought content normal.         Judgment: Judgment normal.         Laboratory  I have reviewed all pertinent lab results within the past 24 hours.  CBC:   Recent Labs   Lab 11/27/23  0750   WBC 9.16   RBC 3.71*   HGB 10.4*   HCT 31.6*   *   MCV 85   MCH 28.0   MCHC 32.9     BMP:   Recent Labs   Lab 11/27/23  0750   GLU 99      K 4.0      CO2 28   BUN 45*   CREATININE 4.2*   CALCIUM 8.6*   MG 2.0     CMP:   Recent Labs   Lab 11/27/23  0750   GLU 99   CALCIUM 8.6*   ALBUMIN 3.1*   PROT 6.6      K 4.0   CO2 28      BUN 45*   CREATININE 4.2*   ALKPHOS 84   ALT 8*   AST 14   BILITOT 0.4     LFTs:   Recent Labs   Lab 11/27/23  0750   ALT 8*   AST 14   ALKPHOS 84   BILITOT 0.4   PROT 6.6   ALBUMIN 3.1*     Coagulation: No results for input(s): "LABPROT", "INR", "APTT" in the last 168 hours.  Microbiology Results (last 7 days)       ** No results found for the last 168 hours. **            ASA/Mallampati  ASA: 3  Mallampati: 2    Imaging:  Recent imaging studies reviewed.     ASSESSMENT/PLAN:     Assessment:  63 y.o. " female with a PMHx of ESRD on HD, CHF, HTN, HLD, T2DM, Factor XI deficiency who has been referred to IR for TDC placement for HD access. The procedure was discussed in great detail with the patient including thorough explanations of the potential risks and benefits of TDC placement. Risks include bleeding at the puncture site, infection, catheter related thrombus, catheter dysfunction, and central vein stenosis. The patient is a candidate for TDC placement under moderate sedation. It is possible TDC placement may get postponed due to scheduling difficulties. If pt needs urgent HD, recommend temporary trialysis placement with anesthesia/surgery. Plan discussed with ordering physician.The pt verbalized understanding of the plan and would like to proceed.    Plan:  Will proceed with TDC placement under moderate sedation on 11/28/23; ordering team aware case may get postponed  Please keep pt NPO starting at midnight on 11/28/23.   Anticoagulation history reviewed.   Coagulation labs reviewed.  Thank you for the consult. Please contact with questions via [employer] secure chat or spectra    Nelda Mendez PA-C  Interventional Radiology  Spectra: [phone]

## 2023-11-27 NOTE — H&P (VIEW-ONLY)
[facility]  Vascular Surgery  Consult Note    Inpatient consult to Vascular Surgery  Consult performed by: VALENTIN Kennedy MD  Consult ordered by: Stepan Nuñez, ASIA        Subjective:     Chief Complaint/Reason for Admission: Loss of central venous access    History of Present Illness: Kathie Quezada is a 63 y.o. female with PMHx of ESRD on HD, CHF, HTN, HLD, T2DM, Factor XI deficiency who presented to [facility] ED on 11/26/23 after she dislodged her right upper chest permacath. Patient was admitted for further evaluation and assessment by vascular surgery for vascular access as needed for dialysis.  Patient has not been dialyzed since last Friday.  The patient does have a left upper extremity AVF that has required PTA on multiple occasions by Dr. Tavera.  She is scheduled for fistulogram and possible TPA/intervention with Dr. Tavera on 11/30/2023.     Medications Prior to Admission   Medication Sig Dispense Refill Last Dose    calcium acetate,phosphat bind, (PHOSLO) 667 mg capsule Take 2 capsules by mouth 3 (three) times daily 180 capsule 11     cholecalciferol, vitamin D3, 125 mcg (5,000 unit) Tab Take 1 tablet (5,000 Units total) by mouth once daily. 90 tablet 3     EScitalopram oxalate (LEXAPRO) 10 MG tablet Take 1 tablet (10 mg total) by mouth once daily. (Patient taking differently: Take 10 mg by mouth every evening.) 90 tablet 3     ferrous gluconate (FERGON) 324 MG tablet Take 1 tablet (324 mg total) by mouth daily with breakfast. 90 tablet 1     hydrALAZINE (APRESOLINE) 50 MG tablet Take 1 tablet (50 mg total) by mouth every 12 (twelve) hours. 180 tablet 1     irbesartan (AVAPRO) 300 MG tablet Take 1 tablet by mouth daily 30 tablet 11     levocetirizine (XYZAL) 5 MG tablet Take 1 tablet (5 mg total) by mouth daily as needed for Allergies. 90 tablet 3     NIFEdipine (ADALAT CC) 60 MG TbSR Take 1 tablet by mouth daily 30 tablet 11 11/26/2023    SYNTHROID 200 mcg tablet Take 1 tablet (200  mcg total) by mouth before breakfast. 90 tablet 3 11/26/2023    traZODone (DESYREL) 50 MG tablet Take 1 to 2 tablets ( mg total) by mouth nightly as needed for Insomnia. 120 tablet 3     OPW TEST CLAIM - DO NOT FILL OPW test claim. Do not fill. 1 tablet 0        Review of patient's allergies indicates:   Allergen Reactions    Atorvastatin Other (See Comments)     Statin induced myalgia    Mushroom Hives    Peanut Anaphylaxis    Bactrim [sulfamethoxazole-trimethoprim] Hives    Gabapentin      Pruritis     Iodine     Peanut butter flavor     Penicillins Hives    Shellfish containing products Itching    Sulfa (sulfonamide antibiotics) Hives       Past Medical History:   Diagnosis Date    Acute respiratory failure with hypoxia and hypercapnia 05/26/2019    ATN (acute tubular necrosis) 02/08/2019    Chronic diastolic heart failure 02/07/2019 2-8-19 Mild left atrial enlargement. Mild left ventricular enlargement. Normal left ventricular systolic function. The estimated ejection fraction is 55% Indeterminate left ventricular diastolic function. Normal right ventricular systolic function. Mild mitral regurgitation. Mild to moderate tricuspid regurgitation. The estimated PA systolic pressure is 32 mm Hg Normal central venous pressure (3 m    CKD (chronic kidney disease) stage 4, GFR 15-29 ml/min 05/16/2018    CKD stage G3a/A3, GFR 45-59 and albumin creatinine ratio >300 mg/g 05/16/2018    CKD stage G3b/A3, GFR 30-44 and albumin creatinine ratio >300 mg/g 05/16/2018    Closed compression fracture of L2 lumbar vertebra 05/24/2019    Closed compression fracture of L2 lumbar vertebra 05/24/2019     IMO Regulatory Load October 2019    Controlled type 2 diabetes with retinopathy 04/09/2018    COVID-19 virus infection 7-1-2021 07/01/2021    CVA (cerebral vascular accident) 2017    Diabetic polyneuropathy associated with type 2 diabetes mellitus 12/04/2020    Encounter for blood transfusion     Essential hypertension  04/09/2018    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  7-31-19 Factor XI Activity 55 - 145 % 96       Gastroesophageal reflux disease without esophagitis 05/24/2019    Generalized anxiety disorder 11/08/2019    GERD (gastroesophageal reflux disease)     GIB (gastrointestinal bleeding) 10/18/2018    History of CVA (cerebrovascular accident) without residual deficits 11/27/2022    History of hepatitis C     s/p succesful Rx w/ Harvoni  - SVR12 (cure) 2016    Hypertensive retinopathy, bilateral 12/10/2019    Mild nonproliferative diabetic retinopathy 05/02/2018    Mixed hyperlipidemia 04/09/2018    Myalgia due to statin 09/07/2022    Normocytic anemia 01/14/2022    NS (nuclear sclerosis), bilateral 12/10/2019    Peanut allergy 02/04/2023    PNA (pneumonia) 05/28/2019    Postoperative hypothyroidism     Renovascular hypertension 04/09/2018    S/P kyphoplasty 08/14/2019 8/14/19: L2 kyphoplasty with Dr. Kwon     Slow transit constipation 05/27/2019    Type 2 diabetes mellitus with circulatory disorder, with long-term current use of insulin 04/09/2018    Type 2 diabetes mellitus with stage 3 chronic kidney disease, without long-term current use of insulin 04/09/2018    Type 2 diabetes mellitus with stage 3a chronic kidney disease, without long-term current use of insulin 03/03/2021    Type 2 diabetes mellitus with stage 3b chronic kidney disease, without long-term current use of insulin 09/07/2022     Past Surgical History:   Procedure Laterality Date    AV FISTULA PLACEMENT Left 10/27/2022    Procedure: CREATION, AV FISTULA;  Surgeon: Tirso Tavera MD;  Location: [facility] OR [address] [place] [address];  Service: Peripheral Vascular;  Laterality: Left;  Brachio basilic 1st stage     BACK SURGERY  2017    CHOLECYSTECTOMY  2015    [facility]    COLONOSCOPY  11/13/2015    Dr Chino torres prep. internal hemorrhoids. diverticulosis of sigmois. NO polyps. repeat due 2025    FISTULOGRAM N/A 12/16/2022    Procedure:  Fistulogram;  Surgeon: Tirso Tavera MD;  Location: [facility] CATH LAB;  Service: Peripheral Vascular;  Laterality: N/A;    FISTULOGRAM, WITH PTA Left 3/23/2023    Procedure: FISTULOGRAM, WITH PTA;  Surgeon: Tirso Tavera MD;  [address];  Service: Peripheral Vascular;  Laterality: Left;  LUE TRANSRADIAL CO2 FISTULOGRAM    1.9 min  22.99 mGy  4.3032 Gy.cm  200cc CO2  9ml TransRadial Solution    FISTULOGRAM, WITH PTA Left 8/15/2023    Procedure: FISTULOGRAM, WITH PTA TRANSRADIAL;  Surgeon: Tirso Tavera MD;  Location: [address];  Service: Peripheral Vascular;  Laterality: Left;  Fluoro: 5.1 min, mGy: 7.95, Gycm2: 1.2626, contrast: 12ml    FIXATION KYPHOPLASTY Bilateral 08/14/2019    Procedure: L2 kyphoplasty Fluoro Globus;  Surgeon: Jeremie Kwon DO;  Location: [facility];  Service: Neurosurgery;  Laterality: Bilateral;  GLOBUS MACRINA CHAUDHARIYIFAN [phone] TEXTED HER @ 10:54AM ON 7-30-19  PRE-OP-BY RN  8-7-2019    HYSTERECTOMY      OOPHORECTOMY  1996    one    PERCUTANEOUS TRANSLUMINAL ANGIOPLASTY OF ARTERIOVENOUS FISTULA Left 12/16/2022    Procedure: PTA, AV FISTULA;  Surgeon: Tirso Tavera MD;  Location: [facility] CATH LAB;  Service: Peripheral Vascular;  Laterality: Left;    RIGHT HEART CATHETERIZATION Right 6/15/2023    Procedure: INSERTION, CATHETER, RIGHT HEART;  Surgeon: Lucretia Rivas MD;  Location: [facility] CATH LAB;  Service: Cardiology;  Laterality: Right;    THYROIDECTOMY  2016    [place] nodule    TONSILLECTOMY      TRANSPOSITION OF BASILIC VEIN Left 1/31/2023    Procedure: TRANSPOSITION, VEIN, BASILIC;  Surgeon: Tirso Tavera MD;  Location: [address];  Service: Peripheral Vascular;  Laterality: Left;     Family History       Problem Relation (Age of Onset)    Aneurysm Brother    Coronary artery disease Mother    Diabetes type II Mother    Heart disease Mother    Hypertension Mother, Father    Prostate cancer Father    Stroke Mother          Tobacco Use    Smoking  status: Never     Passive exposure: Past    Smokeless tobacco: Never   Substance and Sexual Activity    Alcohol use: Not Currently    Drug use: No    Sexual activity: Not Currently     Review of Systems  Pertinent positives and negatives as per HPI.    Objective:     Vital Signs (Most Recent):  Temp: 98.2 °F (36.8 °C) (11/27/23 0850)  Pulse: 80 (11/27/23 1346)  Resp: 17 (11/27/23 1346)  BP: 127/67 (11/27/23 1346)  SpO2: 99 % (11/27/23 1346) Vital Signs (24h Range):  Temp:  [97.9 °F (36.6 °C)-98.6 °F (37 °C)] 98.2 °F (36.8 °C)  Pulse:  [73-90] 80  Resp:  [15-18] 17  SpO2:  [94 %-99 %] 99 %  BP: (127-167)/(59-92) 127/67     Weight: 108 kg (238 lb)  Body mass index is 34.15 kg/m².      Physical Exam  Constitutional:       Appearance: Normal appearance.      Comments: Resting comfortably in bed.   HENT:      Head: Normocephalic and atraumatic.      Mouth/Throat:      Mouth: Mucous membranes are moist.   Eyes:      Extraocular Movements: Extraocular movements intact.      Pupils: Pupils are equal, round, and reactive to light.   Cardiovascular:      Rate and Rhythm: Normal rate and regular rhythm.      Comments: Bilateral radial pulses are palpated    There is no right upper chest PermCath in place.      Her LUE BVT AVF lacks continuous of the drill.  There is systolic thrill that corresponds to the pulse in her bilateral radial arteries.  The systolic thrill is best palpated at the distal aspect of the surgical incision, approximately 2-3 cm proximal to the elbow.  Neurological:      Mental Status: She is alert.          Significant Labs:  All pertinent labs from the last 24 hours have been reviewed.    Significant Diagnostics:  I have reviewed all pertinent imaging results/findings within the past 24 hours.  Assessment/Plan:     ESRD on dialysis  Kathie Quezada is a 63 y.o. female with PMHx of ESRD on HD, CHF, HTN, HLD, T2DM, Factor XI deficiency who presents with a lack of central venous access after  dislodging her right upper chest PermCath on 11/26/2023.  Vascular surgery was consulted for replacement of PermCath versus moving up the patient's LUE fistulogram and PTA that is scheduled for 11/30/2023.    Plan:   - recommended replacing the patient's right upper chest PermCath  - will keep the patient on the schedule for LUE fistulogram and PTA scheduled for 11/30/2023 with Dr. Tavera   - remainder of care per primary team.      Thank you for your consult. I will sign off. Please contact us if you have any additional questions.    COURT Kennedy MD  Vascular Surgery  [facility]

## 2023-11-27 NOTE — ASSESSMENT & PLAN NOTE
Kathie Quezada is a 63 y.o. female with PMHx of ESRD on HD, CHF, HTN, HLD, T2DM, Factor XI deficiency who presents with a lack of central venous access after dislodging her right upper chest PermCath on 11/26/2023.  Vascular surgery was consulted for replacement of PermCath versus moving up the patient's LUE fistulogram and PTA that is scheduled for 11/30/2023.    Plan:   - recommended replacing the patient's right upper chest PermCath  - will keep the patient on the schedule for LUE fistulogram and PTA scheduled for 11/30/2023 with Dr. Tavera   - remainder of care per primary team.

## 2023-11-27 NOTE — HPI
Kathie Quezada is a 63 y.o. female with PMHx of ESRD on HD, CHF, HTN, HLD, T2DM, Factor XI deficiency who presented to [facility] ED on 11/26/23 after she dislodged her right upper chest permacath. Patient was admitted for further evaluation and assessment by vascular surgery for vascular access as needed for dialysis.  Patient has not been dialyzed since last Friday.  The patient does have a left upper extremity AVF that has required PTA on multiple occasions by Dr. Tavera.  She is scheduled for fistulogram and possible TPA/intervention with Dr. Tavera on 11/30/2023.

## 2023-11-27 NOTE — PROGRESS NOTES
[facility] - [facility]  Nephrology  Progress Note    Patient Name: Kathie Quezada  MRN: [id number]  Admission Date: 11/26/2023  Hospital Length of Stay: 0 days  Attending Provider: Dilia Hernandez MD   Primary Care Physician: Elan Price MD  Principal Problem:Problem with vascular access    Subjective:     HPI: Kathie Quezada is a 63 y.o. female with PMHx of ESRD on HD, CHF, HTN, HLD, T2DM, Factor XI deficiency who [place] [facility] [place] for evaluation of vascular access problem. Right upper chest permacath reportedly came out overnight. She reports oozing to site, denies associated pain. He last full HD session was two days prior. She denies associated HA, fever, chills, shortness of breath, chest pain, abdominal pain, n/v/d, urinary symptoms, numbness or tingling. Of note, she is scheduled for LUE AVG declot on 11/30 with Dr. Tavera.     Last iHD session; 11/24      Interval History:   No complaints this afternoon, awaiting vascular sx evaluation.  IR has been consulted for TDC placement.    Labs non-emergent, oxygenating well on RA.    Review of patient's allergies indicates:   Allergen Reactions    Atorvastatin Other (See Comments)     Statin induced myalgia    Mushroom Hives    Peanut Anaphylaxis    Bactrim [sulfamethoxazole-trimethoprim] Hives    Gabapentin      Pruritis     Iodine     Peanut butter flavor     Penicillins Hives    Shellfish containing products Itching    Sulfa (sulfonamide antibiotics) Hives     Current Facility-Administered Medications   Medication Frequency    acetaminophen tablet 650 mg Q4H PRN    albuterol-ipratropium 2.5 mg-0.5 mg/3 mL nebulizer solution 3 mL Q4H PRN    calcium acetate(phosphat bind) capsule 1,334 mg TID WM    cholecalciferol (vitamin D3) 125 mcg (5,000 unit) tablet 5,000 Units Daily    dextrose 10% bolus 125 mL 125 mL PRN    dextrose 10% bolus 250 mL 250 mL PRN    EScitalopram oxalate tablet 10 mg QHS    ferrous gluconate tablet 324 mg Daily with  breakfast    glucagon (human recombinant) injection 1 mg PRN    glucose chewable tablet 16 g PRN    glucose chewable tablet 24 g PRN    heparin (porcine) injection 5,000 Units Q8H    hydrALAZINE tablet 50 mg Q12H    insulin aspart U-100 pen 0-5 Units QID (AC + HS) PRN    levothyroxine tablet 200 mcg Before breakfast    losartan tablet 100 mg Daily    melatonin tablet 6 mg Nightly PRN    naloxone 0.4 mg/mL injection 0.02 mg PRN    NIFEdipine 24 hr tablet 60 mg Daily    ondansetron disintegrating tablet 8 mg Q8H PRN    polyethylene glycol packet 17 g Daily PRN    promethazine tablet 25 mg Q6H PRN    sodium chloride 0.9% flush 10 mL PRN    sodium chloride 0.9% flush 10 mL Q12H PRN    traZODone tablet 100 mg Nightly PRN       Objective:     Vital Signs (Most Recent):  Temp: 98.2 °F (36.8 °C) (11/27/23 0850)  Pulse: 79 (11/27/23 1525)  Resp: 17 (11/27/23 1346)  BP: 127/67 (11/27/23 1346)  SpO2: 99 % (11/27/23 1346) Vital Signs (24h Range):  Temp:  [97.9 °F (36.6 °C)-98.2 °F (36.8 °C)] 98.2 °F (36.8 °C)  Pulse:  [73-85] 79  Resp:  [15-18] 17  SpO2:  [94 %-99 %] 99 %  BP: (127-148)/(59-70) 127/67     Weight: 108 kg (238 lb) (11/26/23 1009)  Body mass index is 34.15 kg/m².  Body surface area is 2.31 meters squared.    I/O last 3 completed shifts:  In: 237 [P.O.:237]  Out: -      Physical Exam  Vitals and nursing note reviewed.   Constitutional:       General: She is not in acute distress.     Appearance: She is well-developed. She is obese.   HENT:      Head: Normocephalic and atraumatic.      Mouth/Throat:      Pharynx: No oropharyngeal exudate.   Eyes:      Conjunctiva/sclera: Conjunctivae normal.      Pupils: Pupils are equal, round, and reactive to light.   Cardiovascular:      Rate and Rhythm: Normal rate and regular rhythm.      Heart sounds: Normal heart sounds.   Pulmonary:      Effort: Pulmonary effort is normal. No respiratory distress.      Breath sounds: Normal breath sounds. No wheezing.   Chest:      Comments:  Dislodged permacath from right chest. Site bandaged, no bleeding/oozing.   Abdominal:      General: Bowel sounds are normal. There is no distension.      Palpations: Abdomen is soft.      Tenderness: There is no abdominal tenderness.   Musculoskeletal:         General: No tenderness. Normal range of motion.      Cervical back: Normal range of motion and neck supple.   Lymphadenopathy:      Cervical: No cervical adenopathy.   Skin:     General: Skin is warm and dry.      Capillary Refill: Capillary refill takes less than 2 seconds.      Findings: No rash.   Neurological:      Mental Status: She is alert and oriented to person, place, and time.      Cranial Nerves: No cranial nerve deficit.      Sensory: No sensory deficit.      Coordination: Coordination normal.   Psychiatric:         Behavior: Behavior normal.         Thought Content: Thought content normal.         Judgment: Judgment normal.          Significant Labs:  CBC:   Recent Labs   Lab 11/27/23  0750   WBC 9.16   RBC 3.71*   HGB 10.4*   HCT 31.6*   *   MCV 85   MCH 28.0   MCHC 32.9     CMP:   Recent Labs   Lab 11/27/23  0750   GLU 99   CALCIUM 8.6*   ALBUMIN 3.1*   PROT 6.6      K 4.0   CO2 28      BUN 45*   CREATININE 4.2*   ALKPHOS 84   ALT 8*   AST 14   BILITOT 0.4        Assessment/Plan:     Renal/  ESRD on dialysis  ESRD, on HD, MWF   Last iHD;11/24  Residual urine; 2-3 times daily  Access; LUE AVG (declot surgery on 11/30)  Dry Wt; 107kg    Recommendations;  - No need for emergent RRT, will have her normal schedule after replacement of the central line, declotting surgery on 11/30 by Dr Dewey.  -I/Os  -Renal diet      Anemia in ESRD;  Hb target; 10-12  .  Hemoglobin   Date Value Ref Range Status   11/27/2023 10.4 (L) 12.0 - 16.0 g/dL Final   11/26/2023 10.8 (L) 12.0 - 16.0 g/dL Final   08/10/2023 10.9 (L) 12.0 - 16.0 g/dL Final     Saturated Iron   Date Value Ref Range Status   05/16/2023 29 20 - 50 % Final   03/16/2023 14 (L) 20  - 50 % Final   01/27/2023 12 (L) 20 - 50 % Final     Ferritin   Date Value Ref Range Status   06/05/2023 475 (H) 20.0 - 300.0 ng/mL Final   05/16/2023 556 (H) 20.0 - 300.0 ng/mL Final   03/16/2023 121 20.0 - 300.0 ng/mL Final     Iron   Date Value Ref Range Status   05/16/2023 77 30 - 160 ug/dL Final   03/16/2023 43 30 - 160 ug/dL Final   01/27/2023 40 30 - 160 ug/dL Final     TIBC   Date Value Ref Range Status   05/16/2023 268 250 - 450 ug/dL Final   03/16/2023 308 250 - 450 ug/dL Final     Vitamin B-12   Date Value Ref Range Status   07/31/2019 412 210 - 950 pg/mL Final        Mineral Bone Disease;  PTH, PO4, S. Vit D    PTH, Intact   Date Value Ref Range Status   08/10/2023 160.7 (H) 9.0 - 77.0 pg/mL Final   07/10/2023 153.0 (H) 9.0 - 77.0 pg/mL Final     Calcium   Date Value Ref Range Status   11/27/2023 8.6 (L) 8.7 - 10.5 mg/dL Final   11/26/2023 9.2 8.7 - 10.5 mg/dL Final     Phosphorus   Date Value Ref Range Status   11/27/2023 4.1 2.7 - 4.5 mg/dL Final   08/10/2023 4.4 2.7 - 4.5 mg/dL Final     Vit D, 25-Hydroxy   Date Value Ref Range Status   03/27/2023 60 30 - 96 ng/mL Final     Comment:     Vitamin D deficiency.........<10 ng/mL                              Vitamin D insufficiency......10-29 ng/mL       Vitamin D sufficiency........> or equal to 30 ng/mL  Vitamin D toxicity............>100 ng/mL            Avery Jackson NP  Nephrology  [facility] - Observation 11H

## 2023-11-27 NOTE — SUBJECTIVE & OBJECTIVE
Past Medical History:   Diagnosis Date    Acute respiratory failure with hypoxia and hypercapnia 05/26/2019    ATN (acute tubular necrosis) 02/08/2019    Chronic diastolic heart failure 02/07/2019    2-8-19 Mild left atrial enlargement. Mild left ventricular enlargement. Normal left ventricular systolic function. The estimated ejection fraction is 55% Indeterminate left ventricular diastolic function. Normal right ventricular systolic function. Mild mitral regurgitation. Mild to moderate tricuspid regurgitation. The estimated PA systolic pressure is 32 mm Hg Normal central venous pressure (3 m    CKD (chronic kidney disease) stage 4, GFR 15-29 ml/min 05/16/2018    CKD stage G3a/A3, GFR 45-59 and albumin creatinine ratio >300 mg/g 05/16/2018    CKD stage G3b/A3, GFR 30-44 and albumin creatinine ratio >300 mg/g 05/16/2018    Closed compression fracture of L2 lumbar vertebra 05/24/2019    Closed compression fracture of L2 lumbar vertebra 05/24/2019     IMO Regulatory Load October 2019    Controlled type 2 diabetes with retinopathy 04/09/2018    COVID-19 virus infection 7-1-2021 07/01/2021    CVA (cerebral vascular accident) 2017    Diabetic polyneuropathy associated with type 2 diabetes mellitus 12/04/2020    Encounter for blood transfusion     Essential hypertension 04/09/2018    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  7-31-19 Factor XI Activity 55 - 145 % 96       Gastroesophageal reflux disease without esophagitis 05/24/2019    Generalized anxiety disorder 11/08/2019    GERD (gastroesophageal reflux disease)     GIB (gastrointestinal bleeding) 10/18/2018    History of CVA (cerebrovascular accident) without residual deficits 11/27/2022    History of hepatitis C     s/p succesful Rx w/ P[facility]  - SVR12 (cure) 2016    Hypertensive retinopathy, bilateral 12/10/2019    Mild nonproliferative diabetic retinopathy 05/02/2018    Mixed hyperlipidemia 04/09/2018    Myalgia due to statin 09/07/2022     Normocytic anemia 01/14/2022    NS (nuclear sclerosis), bilateral 12/10/2019    Peanut allergy 02/04/2023    PNA (pneumonia) 05/28/2019    Postoperative hypothyroidism     Renovascular hypertension 04/09/2018    S/P kyphoplasty 08/14/2019 8/14/19: L2 kyphoplasty with Dr. Kwon     Slow transit constipation 05/27/2019    Type 2 diabetes mellitus with circulatory disorder, with long-term current use of insulin 04/09/2018    Type 2 diabetes mellitus with stage 3 chronic kidney disease, without long-term current use of insulin 04/09/2018    Type 2 diabetes mellitus with stage 3a chronic kidney disease, without long-term current use of insulin 03/03/2021    Type 2 diabetes mellitus with stage 3b chronic kidney disease, without long-term current use of insulin 09/07/2022       Past Surgical History:   Procedure Laterality Date    AV FISTULA PLACEMENT Left 10/27/2022    Procedure: CREATION, AV FISTULA;  Surgeon: Tirso Tavera MD;  Location: [facility] OR [address] [place] [address];  Service: Peripheral Vascular;  Laterality: Left;  Brachio basilic 1st stage     BACK SURGERY  2017    CHOLECYSTECTOMY  2015    [facility]    COLONOSCOPY  11/13/2015    Dr Chino torres prep. internal hemorrhoids. diverticulosis of sigmois. NO polyps. repeat due 2025    FISTULOGRAM N/A 12/16/2022    Procedure: Fistulogram;  Surgeon: Tirso Tavera MD;  Location: [facility] CATH LAB;  Service: Peripheral Vascular;  Laterality: N/A;    FISTULOGRAM, WITH PTA Left 3/23/2023    Procedure: FISTULOGRAM, WITH PTA;  Surgeon: Tirso Tavera MD;  [facility];  Service: Peripheral Vascular;  Laterality: Left;  LUE TRANSRADIAL CO2 FISTULOGRAM    1.9 min  22.99 mGy  4.3032 Gy.cm  200cc CO2  9ml TransRadial Solution    FISTULOGRAM, WITH PTA Left 8/15/2023    Procedure: FISTULOGRAM, WITH PTA TRANSRADIAL;  Surgeon: Tirso Tavera MD;  [facility];  Service: Peripheral Vascular;  Laterality: Left;  Fluoro: 5.1 min, mGy: 7.95, Gycm2: 1.2626, contrast:  12ml    FIXATION KYPHOPLASTY Bilateral 08/14/2019    Procedure: L2 kyphoplasty Fluoro Globus;  Surgeon: Jeremie Kwon DO;  Location: [facility] OR;  Service: Neurosurgery;  Laterality: Bilateral;  CROW [place] [phone] TEXTED HER @ 10:54AM ON 7-30-19  PRE-OP-BY RN  8-7-2019    HYSTERECTOMY      OOPHORECTOMY  1996    one    PERCUTANEOUS TRANSLUMINAL ANGIOPLASTY OF ARTERIOVENOUS FISTULA Left 12/16/2022    Procedure: PTA, AV FISTULA;  Surgeon: Tirso Tavera MD;  Location: [facility] CATH LAB;  Service: Peripheral Vascular;  Laterality: Left;    RIGHT HEART CATHETERIZATION Right 6/15/2023    Procedure: INSERTION, CATHETER, RIGHT HEART;  Surgeon: Lucretia Rivas MD;  Location: [facility] CATH LAB;  Service: Cardiology;  Laterality: Right;    THYROIDECTOMY  2016    at [facility] for thyroid nodule    TONSILLECTOMY      TRANSPOSITION OF BASILIC VEIN Left 1/31/2023    Procedure: TRANSPOSITION, VEIN, BASILIC;  Surgeon: Tirso Tavera MD;  [address]  Service: Peripheral Vascular;  Laterality: Left;       Review of patient's allergies indicates:   Allergen Reactions    Atorvastatin Other (See Comments)     Statin induced myalgia    Mushroom Hives    Peanut Anaphylaxis    Bactrim [sulfamethoxazole-trimethoprim] Hives    Gabapentin      Pruritis     Iodine     Peanut butter flavor     Penicillins Hives    Shellfish containing products Itching    Sulfa (sulfonamide antibiotics) Hives     Current Facility-Administered Medications   Medication Frequency    acetaminophen tablet 650 mg Q4H PRN    albuterol-ipratropium 2.5 mg-0.5 mg/3 mL nebulizer solution 3 mL Q4H PRN    calcium acetate(phosphat bind) capsule 1,334 mg TID WM    cholecalciferol (vitamin D3) 125 mcg (5,000 unit) tablet 5,000 Units Daily    dextrose 10% bolus 125 mL 125 mL PRN    dextrose 10% bolus 250 mL 250 mL PRN    EScitalopram oxalate tablet 10 mg QHS    [START ON 11/27/2023] ferrous gluconate tablet 324 mg Daily with breakfast    glucagon (human  recombinant) injection 1 mg PRN    glucose chewable tablet 16 g PRN    glucose chewable tablet 24 g PRN    heparin (porcine) injection 5,000 Units Q8H    hydrALAZINE tablet 50 mg Q12H    insulin aspart U-100 pen 0-5 Units QID (AC + HS) PRN    [START ON 11/27/2023] levothyroxine tablet 200 mcg Before breakfast    losartan tablet 100 mg Daily    melatonin tablet 6 mg Nightly PRN    naloxone 0.4 mg/mL injection 0.02 mg PRN    NIFEdipine 24 hr tablet 60 mg Daily    ondansetron disintegrating tablet 8 mg Q8H PRN    polyethylene glycol packet 17 g Daily PRN    promethazine tablet 25 mg Q6H PRN    sodium chloride 0.9% flush 10 mL PRN    sodium chloride 0.9% flush 10 mL Q12H PRN    traZODone tablet 100 mg Nightly PRN     Family History       Problem Relation (Age of Onset)    Aneurysm Brother    Coronary artery disease Mother    Diabetes type II Mother    Heart disease Mother    Hypertension Mother, Father    Prostate cancer Father    Stroke Mother          Tobacco Use    Smoking status: Never     Passive exposure: Past    Smokeless tobacco: Never   Substance and Sexual Activity    Alcohol use: Not Currently    Drug use: No    Sexual activity: Not Currently     Review of Systems   Constitutional: Negative.    HENT: Negative.     Eyes: Negative.    Respiratory:          On room air   Genitourinary:         Usually urinates 2-3 times   Musculoskeletal:         No edema in lower extremities     Objective:     Vital Signs (Most Recent):  Temp: 97.9 °F (36.6 °C) (11/26/23 1747)  Pulse: 79 (11/26/23 1747)  Resp: 17 (11/26/23 1747)  BP: (!) 148/70 (11/26/23 1747)  SpO2: 98 % (11/26/23 1747) Vital Signs (24h Range):  Temp:  [97.9 °F (36.6 °C)-99.4 °F (37.4 °C)] 97.9 °F (36.6 °C)  Pulse:  [77-90] 79  Resp:  [17-20] 17  SpO2:  [97 %-99 %] 98 %  BP: (144-176)/(67-92) 148/70     Weight: 108 kg (238 lb) (11/26/23 1009)  Body mass index is 34.15 kg/m².  Body surface area is 2.31 meters squared.    No intake/output data recorded.      Physical Exam  Constitutional:       Appearance: She is obese.   Pulmonary:      Effort: Pulmonary effort is normal.   Abdominal:      Palpations: Abdomen is soft.   Musculoskeletal:         General: No swelling.      Right lower leg: No edema.      Left lower leg: No edema.   Skin:     General: Skin is warm.   Neurological:      General: No focal deficit present.      Mental Status: She is alert.   Psychiatric:         Mood and Affect: Mood normal.         Behavior: Behavior normal.          Significant Labs:  BMP:   Recent Labs   Lab 11/26/23  1044         K 3.5      CO2 26   BUN 42*   CREATININE 4.2*   CALCIUM 9.2     CBC:   Recent Labs   Lab 11/26/23  1044   WBC 9.54   RBC 3.93*   HGB 10.8*   HCT 33.3*      MCV 85   MCH 27.5   MCHC 32.4       Significant Imaging:  Labs: Reviewed

## 2023-11-27 NOTE — CONSULTS
[facility]  Vascular Surgery  Consult Note    Inpatient consult to Vascular Surgery  Consult performed by: VALENTIN Kennedy MD  Consult ordered by: Stepan Nuñez, ASIA        Subjective:     Chief Complaint/Reason for Admission: Loss of central venous access    History of Present Illness: Kathie Quezada is a 63 y.o. female with PMHx of ESRD on HD, CHF, HTN, HLD, T2DM, Factor XI deficiency who [place] [facility] [place] after she dislodged her right upper chest permacath. Patient was admitted for further evaluation and assessment by vascular surgery for vascular access as needed for dialysis.  Patient has not been dialyzed since last Friday.  The patient does have a left upper extremity AVF that has required PTA on multiple occasions by Dr. Tavera.  She is scheduled for fistulogram and possible TPA/intervention with Dr. Tavera on 11/30/2023.     Medications Prior to Admission   Medication Sig Dispense Refill Last Dose    calcium acetate,phosphat bind, (PHOSLO) 667 mg capsule Take 2 capsules by mouth 3 (three) times daily 180 capsule 11     cholecalciferol, vitamin D3, 125 mcg (5,000 unit) Tab Take 1 tablet (5,000 Units total) by mouth once daily. 90 tablet 3     EScitalopram oxalate (LEXAPRO) 10 MG tablet Take 1 tablet (10 mg total) by mouth once daily. (Patient taking differently: Take 10 mg by mouth every evening.) 90 tablet 3     ferrous gluconate (FERGON) 324 MG tablet Take 1 tablet (324 mg total) by mouth daily with breakfast. 90 tablet 1     hydrALAZINE (APRESOLINE) 50 MG tablet Take 1 tablet (50 mg total) by mouth every 12 (twelve) hours. 180 tablet 1     irbesartan (AVAPRO) 300 MG tablet Take 1 tablet by mouth daily 30 tablet 11     levocetirizine (XYZAL) 5 MG tablet Take 1 tablet (5 mg total) by mouth daily as needed for Allergies. 90 tablet 3     NIFEdipine (ADALAT CC) 60 MG TbSR Take 1 tablet by mouth daily 30 tablet 11 11/26/2023    SYNTHROID 200 mcg tablet Take 1 tablet (200  mcg total) by mouth before breakfast. 90 tablet 3 11/26/2023    traZODone (DESYREL) 50 MG tablet Take 1 to 2 tablets ( mg total) by mouth nightly as needed for Insomnia. 120 tablet 3     OPW TEST CLAIM - DO NOT FILL OPW test claim. Do not fill. 1 tablet 0        Review of patient's allergies indicates:   Allergen Reactions    Atorvastatin Other (See Comments)     Statin induced myalgia    Mushroom Hives    Peanut Anaphylaxis    Bactrim [sulfamethoxazole-trimethoprim] Hives    Gabapentin      Pruritis     Iodine     Peanut butter flavor     Penicillins Hives    Shellfish containing products Itching    Sulfa (sulfonamide antibiotics) Hives       Past Medical History:   Diagnosis Date    Acute respiratory failure with hypoxia and hypercapnia 05/26/2019    ATN (acute tubular necrosis) 02/08/2019    Chronic diastolic heart failure 02/07/2019 2-8-19 Mild left atrial enlargement. Mild left ventricular enlargement. Normal left ventricular systolic function. The estimated ejection fraction is 55% Indeterminate left ventricular diastolic function. Normal right ventricular systolic function. Mild mitral regurgitation. Mild to moderate tricuspid regurgitation. The estimated PA systolic pressure is 32 mm Hg Normal central venous pressure (3 m    CKD (chronic kidney disease) stage 4, GFR 15-29 ml/min 05/16/2018    CKD stage G3a/A3, GFR 45-59 and albumin creatinine ratio >300 mg/g 05/16/2018    CKD stage G3b/A3, GFR 30-44 and albumin creatinine ratio >300 mg/g 05/16/2018    Closed compression fracture of L2 lumbar vertebra 05/24/2019    Closed compression fracture of L2 lumbar vertebra 05/24/2019     IMO Regulatory Load October 2019    Controlled type 2 diabetes with retinopathy 04/09/2018    COVID-19 virus infection 7-1-2021 07/01/2021    CVA (cerebral vascular accident) 2017    Diabetic polyneuropathy associated with type 2 diabetes mellitus 12/04/2020    Encounter for blood transfusion     Essential hypertension  04/09/2018    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  7-31-19 Factor XI Activity 55 - 145 % 96       Gastroesophageal reflux disease without esophagitis 05/24/2019    Generalized anxiety disorder 11/08/2019    GERD (gastroesophageal reflux disease)     GIB (gastrointestinal bleeding) 10/18/2018    History of CVA (cerebrovascular accident) without residual deficits 11/27/2022    History of hepatitis C     s/p succesful Rx w/ [facility]  - SVR12 (cure) 2016    Hypertensive retinopathy, bilateral 12/10/2019    Mild nonproliferative diabetic retinopathy 05/02/2018    Mixed hyperlipidemia 04/09/2018    Myalgia due to statin 09/07/2022    Normocytic anemia 01/14/2022    NS (nuclear sclerosis), bilateral 12/10/2019    Peanut allergy 02/04/2023    PNA (pneumonia) 05/28/2019    Postoperative hypothyroidism     Renovascular hypertension 04/09/2018    S/P kyphoplasty 08/14/2019 8/14/19: L2 kyphoplasty with Dr. Kwon     Slow transit constipation 05/27/2019    Type 2 diabetes mellitus with circulatory disorder, with long-term current use of insulin 04/09/2018    Type 2 diabetes mellitus with stage 3 chronic kidney disease, without long-term current use of insulin 04/09/2018    Type 2 diabetes mellitus with stage 3a chronic kidney disease, without long-term current use of insulin 03/03/2021    Type 2 diabetes mellitus with stage 3b chronic kidney disease, without long-term current use of insulin 09/07/2022     Past Surgical History:   Procedure Laterality Date    AV FISTULA PLACEMENT Left 10/27/2022    Procedure: CREATION, AV FISTULA;  Surgeon: Tirso Tavera MD;  Location: [facility] OR [address] [place] [address];  Service: Peripheral Vascular;  Laterality: Left;  Brachio basilic 1st stage     BACK SURGERY  2017    CHOLECYSTECTOMY  2015    [facility]    COLONOSCOPY  11/13/2015    Dr Chino torres prep. internal hemorrhoids. diverticulosis of sigmois. NO polyps. repeat due 2025    FISTULOGRAM N/A 12/16/2022    Procedure:  Fistulogram;  Surgeon: Tirso Tavera MD;  Location: [facility] CATH LAB;  Service: Peripheral Vascular;  Laterality: N/A;    FISTULOGRAM, WITH PTA Left 3/23/2023    Procedure: FISTULOGRAM, WITH PTA;  Surgeon: Tirso Tavera MD;  [address];  Service: Peripheral Vascular;  Laterality: Left;  LUE TRANSRADIAL CO2 FISTULOGRAM    1.9 min  22.99 mGy  4.3032 Gy.cm  200cc CO2  9ml TransRadial Solution    FISTULOGRAM, WITH PTA Left 8/15/2023    Procedure: FISTULOGRAM, WITH PTA TRANSRADIAL;  Surgeon: Tirso Tavera MD;  Location: [address];  Service: Peripheral Vascular;  Laterality: Left;  Fluoro: 5.1 min, mGy: 7.95, Gycm2: 1.2626, contrast: 12ml    FIXATION KYPHOPLASTY Bilateral 08/14/2019    Procedure: L2 kyphoplasty Fluoro Globus;  Surgeon: Jeremie Kwon DO;  Location: [facility];  Service: Neurosurgery;  Laterality: Bilateral;  GLOBUS MACRINA CHAUDHARIYIFAN [phone] TEXTED HER @ 10:54AM ON 7-30-19  PRE-OP-BY RN  8-7-2019    HYSTERECTOMY      OOPHORECTOMY  1996    one    PERCUTANEOUS TRANSLUMINAL ANGIOPLASTY OF ARTERIOVENOUS FISTULA Left 12/16/2022    Procedure: PTA, AV FISTULA;  Surgeon: Tirso Tavera MD;  Location: [facility] CATH LAB;  Service: Peripheral Vascular;  Laterality: Left;    RIGHT HEART CATHETERIZATION Right 6/15/2023    Procedure: INSERTION, CATHETER, RIGHT HEART;  Surgeon: Lucretia Rivas MD;  Location: [facility] CATH LAB;  Service: Cardiology;  Laterality: Right;    THYROIDECTOMY  2016    at [facility] for thyroid nodule    TONSILLECTOMY      TRANSPOSITION OF BASILIC VEIN Left 1/31/2023    Procedure: TRANSPOSITION, VEIN, BASILIC;  Surgeon: Tirso Tavera MD;  Location: [address];  Service: Peripheral Vascular;  Laterality: Left;     Family History       Problem Relation (Age of Onset)    Aneurysm Brother    Coronary artery disease Mother    Diabetes type II Mother    Heart disease Mother    Hypertension Mother, Father    Prostate cancer Father    Stroke Mother          Tobacco Use    Smoking  status: Never     Passive exposure: Past    Smokeless tobacco: Never   Substance and Sexual Activity    Alcohol use: Not Currently    Drug use: No    Sexual activity: Not Currently     Review of Systems  Pertinent positives and negatives as per HPI.    Objective:     Vital Signs (Most Recent):  Temp: 98.2 °F (36.8 °C) (11/27/23 0850)  Pulse: 80 (11/27/23 1346)  Resp: 17 (11/27/23 1346)  BP: 127/67 (11/27/23 1346)  SpO2: 99 % (11/27/23 1346) Vital Signs (24h Range):  Temp:  [97.9 °F (36.6 °C)-98.6 °F (37 °C)] 98.2 °F (36.8 °C)  Pulse:  [73-90] 80  Resp:  [15-18] 17  SpO2:  [94 %-99 %] 99 %  BP: (127-167)/(59-92) 127/67     Weight: 108 kg (238 lb)  Body mass index is 34.15 kg/m².      Physical Exam  Constitutional:       Appearance: Normal appearance.      Comments: Resting comfortably in bed.   HENT:      Head: Normocephalic and atraumatic.      Mouth/Throat:      Mouth: Mucous membranes are moist.   Eyes:      Extraocular Movements: Extraocular movements intact.      Pupils: Pupils are equal, round, and reactive to light.   Cardiovascular:      Rate and Rhythm: Normal rate and regular rhythm.      Comments: Bilateral radial pulses are palpated    There is no right upper chest PermCath in place.      Her LUE BVT AVF lacks continuous of the drill.  There is systolic thrill that corresponds to the pulse in her bilateral radial arteries.  The systolic thrill is best palpated at the distal aspect of the surgical incision, approximately 2-3 cm proximal to the elbow.  Neurological:      Mental Status: She is alert.          Significant Labs:  All pertinent labs from the last 24 hours have been reviewed.    Significant Diagnostics:  I have reviewed all pertinent imaging results/findings within the past 24 hours.  Assessment/Plan:     ESRD on dialysis  Kathie Quezada is a 63 y.o. female with PMHx of ESRD on HD, CHF, HTN, HLD, T2DM, Factor XI deficiency who presents with a lack of central venous access after  dislodging her right upper chest PermCath on 11/26/2023.  Vascular surgery was consulted for replacement of PermCath versus moving up the patient's LUE fistulogram and PTA that is scheduled for 11/30/2023.    Plan:   - recommended replacing the patient's right upper chest PermCath  - will keep the patient on the schedule for LUE fistulogram and PTA scheduled for 11/30/2023 with Dr. Tavera   - remainder of care per primary team.      Thank you for your consult. I will sign off. Please contact us if you have any additional questions.    COURT Kennedy MD  Vascular Surgery  [facility]

## 2023-11-27 NOTE — PLAN OF CARE
[facility]  Discharge Assessment    Primary Care Provider: Elan Price MD     Discharge Assessment (most recent)       BRIEF DISCHARGE ASSESSMENT - 11/27/23 1221          Discharge Planning    Assessment Type Discharge Planning Brief Assessment     Resource/Environmental Concerns none     Support Systems Family members;Children     Equipment Currently Used at Home shower chair;glucometer;blood pressure machine;commode     Current Living Arrangements home     Patient/Family Anticipates Transition to home     Patient/Family Anticipated Services at Transition none     DME Needed Upon Discharge  none     Discharge Plan A Home     Discharge Plan B Home                   Pt is a 63 y.o. female admitted with vascular access issues and has a PMH of ESRD (MWF [facility] [address]) CHF and HTN. She lives alone in a single story house. She states her son and grandchildren stay with her some times. She is independent with her ADLs and iADLs and she drives. She will have  transportation home. [facility] Discharge Packet given to patient and/or family with understanding verbalized.   name and number and estimated discharge date written on white board in patient's room with request to call for any questions or concerns.  Will continue to follow for needs.  Ishmael Decker, RN,BSN

## 2023-11-27 NOTE — SUBJECTIVE & OBJECTIVE
Medications Prior to Admission   Medication Sig Dispense Refill Last Dose    calcium acetate,phosphat bind, (PHOSLO) 667 mg capsule Take 2 capsules by mouth 3 (three) times daily 180 capsule 11     cholecalciferol, vitamin D3, 125 mcg (5,000 unit) Tab Take 1 tablet (5,000 Units total) by mouth once daily. 90 tablet 3     EScitalopram oxalate (LEXAPRO) 10 MG tablet Take 1 tablet (10 mg total) by mouth once daily. (Patient taking differently: Take 10 mg by mouth every evening.) 90 tablet 3     ferrous gluconate (FERGON) 324 MG tablet Take 1 tablet (324 mg total) by mouth daily with breakfast. 90 tablet 1     hydrALAZINE (APRESOLINE) 50 MG tablet Take 1 tablet (50 mg total) by mouth every 12 (twelve) hours. 180 tablet 1     irbesartan (AVAPRO) 300 MG tablet Take 1 tablet by mouth daily 30 tablet 11     levocetirizine (XYZAL) 5 MG tablet Take 1 tablet (5 mg total) by mouth daily as needed for Allergies. 90 tablet 3     NIFEdipine (ADALAT CC) 60 MG TbSR Take 1 tablet by mouth daily 30 tablet 11 11/26/2023    SYNTHROID 200 mcg tablet Take 1 tablet (200 mcg total) by mouth before breakfast. 90 tablet 3 11/26/2023    traZODone (DESYREL) 50 MG tablet Take 1 to 2 tablets ( mg total) by mouth nightly as needed for Insomnia. 120 tablet 3     OPW TEST CLAIM - DO NOT FILL OPW test claim. Do not fill. 1 tablet 0        Review of patient's allergies indicates:   Allergen Reactions    Atorvastatin Other (See Comments)     Statin induced myalgia    Mushroom Hives    Peanut Anaphylaxis    Bactrim [sulfamethoxazole-trimethoprim] Hives    Gabapentin      Pruritis     Iodine     Peanut butter flavor     Penicillins Hives    Shellfish containing products Itching    Sulfa (sulfonamide antibiotics) Hives       Past Medical History:   Diagnosis Date    Acute respiratory failure with hypoxia and hypercapnia 05/26/2019    ATN (acute tubular necrosis) 02/08/2019    Chronic diastolic heart failure 02/07/2019    2-8-19 Mild left atrial  enlargement. Mild left ventricular enlargement. Normal left ventricular systolic function. The estimated ejection fraction is 55% Indeterminate left ventricular diastolic function. Normal right ventricular systolic function. Mild mitral regurgitation. Mild to moderate tricuspid regurgitation. The estimated PA systolic pressure is 32 mm Hg Normal central venous pressure (3 m    CKD (chronic kidney disease) stage 4, GFR 15-29 ml/min 05/16/2018    CKD stage G3a/A3, GFR 45-59 and albumin creatinine ratio >300 mg/g 05/16/2018    CKD stage G3b/A3, GFR 30-44 and albumin creatinine ratio >300 mg/g 05/16/2018    Closed compression fracture of L2 lumbar vertebra 05/24/2019    Closed compression fracture of L2 lumbar vertebra 05/24/2019     IMO Regulatory Load October 2019    Controlled type 2 diabetes with retinopathy 04/09/2018    COVID-19 virus infection 7-1-2021 07/01/2021    CVA (cerebral vascular accident) 2017    Diabetic polyneuropathy associated with type 2 diabetes mellitus 12/04/2020    Encounter for blood transfusion     Essential hypertension 04/09/2018    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  7-31-19 Factor XI Activity 55 - 145 % 96       Gastroesophageal reflux disease without esophagitis 05/24/2019    Generalized anxiety disorder 11/08/2019    GERD (gastroesophageal reflux disease)     GIB (gastrointestinal bleeding) 10/18/2018    History of CVA (cerebrovascular accident) without residual deficits 11/27/2022    History of hepatitis C     s/p succesful Rx w/ [facility]  - SVR12 (cure) 2016    Hypertensive retinopathy, bilateral 12/10/2019    Mild nonproliferative diabetic retinopathy 05/02/2018    Mixed hyperlipidemia 04/09/2018    Myalgia due to statin 09/07/2022    Normocytic anemia 01/14/2022    NS (nuclear sclerosis), bilateral 12/10/2019    Peanut allergy 02/04/2023    PNA (pneumonia) 05/28/2019    Postoperative hypothyroidism     Renovascular hypertension 04/09/2018    S/P kyphoplasty  08/14/2019 8/14/19: L2 kyphoplasty with Dr. Kwon     Slow transit constipation 05/27/2019    Type 2 diabetes mellitus with circulatory disorder, with long-term current use of insulin 04/09/2018    Type 2 diabetes mellitus with stage 3 chronic kidney disease, without long-term current use of insulin 04/09/2018    Type 2 diabetes mellitus with stage 3a chronic kidney disease, without long-term current use of insulin 03/03/2021    Type 2 diabetes mellitus with stage 3b chronic kidney disease, without long-term current use of insulin 09/07/2022     Past Surgical History:   Procedure Laterality Date    AV FISTULA PLACEMENT Left 10/27/2022    Procedure: CREATION, AV FISTULA;  Surgeon: Tirso Tavera MD;  Location: [facility] OR [address] [place] [address];  Service: Peripheral Vascular;  Laterality: Left;  Brachio basilic 1st stage     BACK SURGERY  2017    CHOLECYSTECTOMY  2015    [facility]    COLONOSCOPY  11/13/2015    Dr Chino torres prep. internal hemorrhoids. diverticulosis of sigmois. NO polyps. repeat due 2025    FISTULOGRAM N/A 12/16/2022    Procedure: Fistulogram;  Surgeon: Tirso Tavera MD;  Location: [facility] CATH LAB;  Service: Peripheral Vascular;  Laterality: N/A;    FISTULOGRAM, WITH PTA Left 3/23/2023    Procedure: FISTULOGRAM, WITH PTA;  Surgeon: Tirso Tavera MD;  [facility];  Service: Peripheral Vascular;  Laterality: Left;  LUE TRANSRADIAL CO2 FISTULOGRAM    1.9 min  22.99 mGy  4.3032 Gy.cm  200cc CO2  9ml TransRadial Solution    FISTULOGRAM, WITH PTA Left 8/15/2023    Procedure: FISTULOGRAM, WITH PTA TRANSRADIAL;  Surgeon: Tirso Tavera MD;  [facility];  Service: Peripheral Vascular;  Laterality: Left;  Fluoro: 5.1 min, mGy: 7.95, Gycm2: 1.2626, contrast: 12ml    FIXATION KYPHOPLASTY Bilateral 08/14/2019    Procedure: L2 kyphoplasty Fluoro Globus;  Surgeon: Jeremie Kwon DO;  Location: [facility] OR;  Service: Neurosurgery;  Laterality: Bilateral;  GLOBUS MACRINA WINSLOW [phone] TEXTED HER  @ 10:54AM ON 7-30-19  PRE-OP-BY RN  8-7-2019    HYSTERECTOMY      OOPHORECTOMY  1996    one    PERCUTANEOUS TRANSLUMINAL ANGIOPLASTY OF ARTERIOVENOUS FISTULA Left 12/16/2022    Procedure: PTA, AV FISTULA;  Surgeon: Tirso Tavera MD;  Location: [facility] CATH LAB;  Service: Peripheral Vascular;  Laterality: Left;    RIGHT HEART CATHETERIZATION Right 6/15/2023    Procedure: INSERTION, CATHETER, RIGHT HEART;  Surgeon: Lucretia Rivas MD;  Location: [facility] CATH LAB;  Service: Cardiology;  Laterality: Right;    THYROIDECTOMY  2016    at [facility] for thyroid nodule    TONSILLECTOMY      TRANSPOSITION OF BASILIC VEIN Left 1/31/2023    Procedure: TRANSPOSITION, VEIN, BASILIC;  Surgeon: Tirso Tavera MD;  Location: [facility] OR [address] [place] [address];  Service: Peripheral Vascular;  Laterality: Left;     Family History       Problem Relation (Age of Onset)    Aneurysm Brother    Coronary artery disease Mother    Diabetes type II Mother    Heart disease Mother    Hypertension Mother, Father    Prostate cancer Father    Stroke Mother          Tobacco Use    Smoking status: Never     Passive exposure: Past    Smokeless tobacco: Never   Substance and Sexual Activity    Alcohol use: Not Currently    Drug use: No    Sexual activity: Not Currently     Review of Systems  Pertinent positives and negatives as per HPI.    Objective:     Vital Signs (Most Recent):  Temp: 98.2 °F (36.8 °C) (11/27/23 0850)  Pulse: 80 (11/27/23 1346)  Resp: 17 (11/27/23 1346)  BP: 127/67 (11/27/23 1346)  SpO2: 99 % (11/27/23 1346) Vital Signs (24h Range):  Temp:  [97.9 °F (36.6 °C)-98.6 °F (37 °C)] 98.2 °F (36.8 °C)  Pulse:  [73-90] 80  Resp:  [15-18] 17  SpO2:  [94 %-99 %] 99 %  BP: (127-167)/(59-92) 127/67     Weight: 108 kg (238 lb)  Body mass index is 34.15 kg/m².      Physical Exam  Constitutional:       Appearance: Normal appearance.      Comments: Resting comfortably in bed.   HENT:      Head: Normocephalic and atraumatic.      Mouth/Throat:      Mouth: Mucous membranes  are moist.   Eyes:      Extraocular Movements: Extraocular movements intact.      Pupils: Pupils are equal, round, and reactive to light.   Cardiovascular:      Rate and Rhythm: Normal rate and regular rhythm.      Comments: Bilateral radial pulses are palpated    There is no right upper chest PermCath in place.      Her LUE BVT AVF lacks continuous of the drill.  There is systolic thrill that corresponds to the pulse in her bilateral radial arteries.  The systolic thrill is best palpated at the distal aspect of the surgical incision, approximately 2-3 cm proximal to the elbow.  Neurological:      Mental Status: She is alert.          Significant Labs:  All pertinent labs from the last 24 hours have been reviewed.    Significant Diagnostics:  I have reviewed all pertinent imaging results/findings within the past 24 hours.

## 2023-11-27 NOTE — HPI
Kathie Quezada is a 63 y.o. female with PMHx of ESRD on HD, CHF, HTN, HLD, T2DM, Factor XI deficiency who presents to [facility] ED for evaluation of vascular access problem. Right upper chest permacath reportedly came out overnight. She reports oozing to site, denies associated pain. He last full HD session was two days prior. She denies associated HA, fever, chills, shortness of breath, chest pain, abdominal pain, n/v/d, urinary symptoms, numbness or tingling. Of note, she is scheduled for LUE AVG declot on 11/30 with Dr. Tavera.     Last iHD session; 11/24

## 2023-11-27 NOTE — PROGRESS NOTES
[facility] [address] [place] [address] Medicine  Progress Note    Patient Name: Kathie Quezada  MRN: [id number]  Patient Class: OP- Observation   Admission Date: 11/26/2023  Length of Stay: 0 days  Attending Physician: Dilia Hernandez MD  Primary Care Provider: Elan Price MD        Subjective:     Principal Problem:Problem with vascular access        HPI:  Kathie Quezada is a 63 y.o. female with PMHx of ESRD on HD, CHF, HTN, HLD, T2DM, Factor XI deficiency who presents to [facility] ED for evaluation of vascular access problem. Right upper chest permacath reportedly came out overnight. She reports oozing to site, denies associated pain. He last full HD session was two days prior. She denies associated HA, fever, chills, shortness of breath, chest pain, abdominal pain, n/v/d, urinary symptoms, numbness or tingling. Of note, she is scheduled for LUE AVG declot on 11/30 with Dr. Tavera.     In the ED: Hypertensive, otherwise VSSAF. CBC without leukocytosis. CMP consistent with ESRD. CXR without acute abnormality. Received no meds. Admitted to  for further management.     Overview/Hospital Course:  Kathie Quezada is placed in  Observation for management of dislodged right chest permacath. She is scheduled for LUE AVG declot on 11/30 with Dr. Tavera. Nephrology following for HD needs. Discussion with Vascular to move declot sooner vs new temporary line with Anesthesia.     Interval History: NAEON, VSSAF. Nephrology following for HD needs. Discussion with Vascular to move declot sooner vs new temporary line with Anesthesia.      Review of Systems   Constitutional:  Negative for activity change, chills and fever.   HENT:  Negative for trouble swallowing.    Eyes:  Negative for photophobia and visual disturbance.   Respiratory:  Negative for cough, chest tightness and shortness of breath.    Cardiovascular:  Negative for chest pain, palpitations and leg swelling.   Gastrointestinal:  Negative for  abdominal pain, constipation, diarrhea, nausea and vomiting.   Genitourinary:  Positive for decreased urine volume (urinates 2-3 times daily). Negative for dysuria, frequency and hematuria.   Musculoskeletal:  Negative for back pain, gait problem and neck pain.   Skin:  Negative for rash and wound.   Neurological:  Negative for dizziness, syncope, speech difficulty and light-headedness.   Psychiatric/Behavioral:  Negative for agitation and confusion. The patient is not nervous/anxious.      Objective:     Vital Signs (Most Recent):  Temp: 98.2 °F (36.8 °C) (11/27/23 0850)  Pulse: 76 (11/27/23 0850)  Resp: 15 (11/27/23 0850)  BP: (!) 143/68 (11/27/23 0850)  SpO2: 99 % (11/27/23 0850) Vital Signs (24h Range):  Temp:  [97.9 °F (36.6 °C)-99.4 °F (37.4 °C)] 98.2 °F (36.8 °C)  Pulse:  [73-90] 76  Resp:  [15-20] 15  SpO2:  [94 %-99 %] 99 %  BP: (136-176)/(59-92) 143/68     Weight: 108 kg (238 lb)  Body mass index is 34.15 kg/m².    Intake/Output Summary (Last 24 hours) at 11/27/2023 0943  Last data filed at 11/26/2023 2137  Gross per 24 hour   Intake 237 ml   Output --   Net 237 ml         Physical Exam  Vitals and nursing note reviewed.   Constitutional:       General: She is not in acute distress.     Appearance: She is well-developed. She is obese.   HENT:      Head: Normocephalic and atraumatic.      Mouth/Throat:      Pharynx: No oropharyngeal exudate.   Eyes:      Conjunctiva/sclera: Conjunctivae normal.      Pupils: Pupils are equal, round, and reactive to light.   Cardiovascular:      Rate and Rhythm: Normal rate and regular rhythm.      Heart sounds: Normal heart sounds.   Pulmonary:      Effort: Pulmonary effort is normal. No respiratory distress.      Breath sounds: Normal breath sounds. No wheezing.   Chest:      Comments: Dislodged permacath from right chest. Site bandaged, no bleeding/oozing.   Abdominal:      General: Bowel sounds are normal. There is no distension.      Palpations: Abdomen is soft.       Tenderness: There is no abdominal tenderness.   Musculoskeletal:         General: No tenderness. Normal range of motion.      Cervical back: Normal range of motion and neck supple.   Lymphadenopathy:      Cervical: No cervical adenopathy.   Skin:     General: Skin is warm and dry.      Capillary Refill: Capillary refill takes less than 2 seconds.      Findings: No rash.   Neurological:      Mental Status: She is alert and oriented to person, place, and time.      Cranial Nerves: No cranial nerve deficit.      Sensory: No sensory deficit.      Coordination: Coordination normal.   Psychiatric:         Behavior: Behavior normal.         Thought Content: Thought content normal.         Judgment: Judgment normal.             Significant Labs: All pertinent labs within the past 24 hours have been reviewed.  CBC:   Recent Labs   Lab 11/26/23  1044 11/27/23  0750   WBC 9.54 9.16   HGB 10.8* 10.4*   HCT 33.3* 31.6*    139*     CMP:   Recent Labs   Lab 11/26/23  1044 11/27/23  0750    144   K 3.5 4.0    107   CO2 26 28    99   BUN 42* 45*   CREATININE 4.2* 4.2*   CALCIUM 9.2 8.6*   PROT 7.8 6.6   ALBUMIN 3.7 3.1*   BILITOT 0.7 0.4   ALKPHOS 83 84   AST 13 14   ALT 7* 8*   ANIONGAP 12 9       Significant Imaging: I have reviewed all pertinent imaging results/findings within the past 24 hours.    Assessment/Plan:      * Problem with vascular access  ESRD on dialysis  - HD MWF, last session 11/24  - dislodged L chest permacath upon waking up this AM  - Nephro consulted for HD needs  - sees Dr. Tavera (Vascular) - scheduled 11/30 for declot of LUE AVG  - Discussion with Vascular to move declot sooner vs new temporary line with Anesthesia  - daily RFP  - cont tele    Metabolic bone disease  - cont phos binder    Iron deficiency anemia  - Hgb 10.8 on admission, near baseline  - cont iron supplementation  - daily CBC  - transfuse if Hgb < 7    Type 2 diabetes mellitus with stage 5 chronic kidney disease not  on chronic dialysis, without long-term current use of insulin  - diet controlled  - LDSSI  - accuchecks    Generalized anxiety disorder  - cont lexapro    Factor XI deficiency  - hx noted    Chronic diastolic heart failure  - hx noted, no acute exacerbation   - HD for volume management   - monitor for signs of overload  - home irbesartan > losartan during admission  - cont tele    Mixed hyperlipidemia  - last lipid panel reviewed  - not on home statin    Postoperative hypothyroidism  - cont home synthroid      VTE Risk Mitigation (From admission, onward)           Ordered     heparin (porcine) injection 5,000 Units  Every 8 hours         11/26/23 1217     IP VTE HIGH RISK PATIENT  Once         11/26/23 1217     Place sequential compression device  Until discontinued         11/26/23 1217     IP VTE HIGH RISK PATIENT  Once         11/26/23 1217     Place sequential compression device  Until discontinued         11/26/23 1217                    Discharge Planning   LINDSEY: 11/28/2023     Code Status: Full Code   Is the patient medically ready for discharge?: No    Reason for patient still in hospital (select all that apply): Patient trending condition, Laboratory test, Treatment, and Consult recommendations                     Stepan Nuñez PA-C  Department of Hospital Medicine   [facility] 11H

## 2023-11-27 NOTE — PLAN OF CARE
Pt educated on fall risk overnight, pt remained free from falls/trauma/injury. Denies chest pain, SOB, palpitations, dizziness, pain, or discomfort. Plan of care reviewed with pt, all questions answered. Bed locked in lowest position, call bell within reach, no acute distress noted, will continue to monitor.     Problem: Adult Inpatient Plan of Care  Goal: Plan of Care Review  Outcome: Ongoing, Progressing  Goal: Patient-Specific Goal (Individualized)  Outcome: Ongoing, Progressing  Goal: Absence of Hospital-Acquired Illness or Injury  Outcome: Ongoing, Progressing  Goal: Optimal Comfort and Wellbeing  Outcome: Ongoing, Progressing  Goal: Readiness for Transition of Care  Outcome: Ongoing, Progressing     Problem: Infection  Goal: Absence of Infection Signs and Symptoms  Outcome: Ongoing, Progressing     Problem: Diabetes Comorbidity  Goal: Blood Glucose Level Within Targeted Range  Outcome: Ongoing, Progressing     Problem: Fall Injury Risk  Goal: Absence of Fall and Fall-Related Injury  Outcome: Ongoing, Progressing

## 2023-11-27 NOTE — ASSESSMENT & PLAN NOTE
ESRD on dialysis  - HD MWF, last session 11/24  - dislodged L chest permacath upon waking up this AM  - Nephro consulted for HD needs  - sees Dr. Tavera (Vascular) - scheduled 11/30 for declot of LUE AVG  - Discussion with Vascular to move declot sooner vs new temporary line with Anesthesia  - daily RFP  - cont tele

## 2023-11-27 NOTE — ASSESSMENT & PLAN NOTE
ESRD, on HD, MWF   Last iHD;11/24  Residual urine; 2-3 times daily  Access; LUE AVG (declot surgery on 11/30), Right central line(malpositioned from original location)  Dry Wt; 107kg    Recommendations;  - No need for emergent RRT, will have her normal schedule after replacement of the central line, declotting surgery on 11/30 by Dr Dewey.  -I/Os  -Renal diet      Anemia in ESRD;  Hb target; 10-12  .  Hemoglobin   Date Value Ref Range Status   11/26/2023 10.8 (L) 12.0 - 16.0 g/dL Final   08/10/2023 10.9 (L) 12.0 - 16.0 g/dL Final   07/26/2023 9.6 (L) 12.0 - 16.0 g/dL Final     Saturated Iron   Date Value Ref Range Status   05/16/2023 29 20 - 50 % Final   03/16/2023 14 (L) 20 - 50 % Final   01/27/2023 12 (L) 20 - 50 % Final     Ferritin   Date Value Ref Range Status   06/05/2023 475 (H) 20.0 - 300.0 ng/mL Final   05/16/2023 556 (H) 20.0 - 300.0 ng/mL Final   03/16/2023 121 20.0 - 300.0 ng/mL Final     Iron   Date Value Ref Range Status   05/16/2023 77 30 - 160 ug/dL Final   03/16/2023 43 30 - 160 ug/dL Final   01/27/2023 40 30 - 160 ug/dL Final     TIBC   Date Value Ref Range Status   05/16/2023 268 250 - 450 ug/dL Final   03/16/2023 308 250 - 450 ug/dL Final     Vitamin B-12   Date Value Ref Range Status   07/31/2019 412 210 - 950 pg/mL Final        Mineral Bone Disease;  PTH, PO4, S. Vit D    PTH, Intact   Date Value Ref Range Status   08/10/2023 160.7 (H) 9.0 - 77.0 pg/mL Final   07/10/2023 153.0 (H) 9.0 - 77.0 pg/mL Final     Calcium   Date Value Ref Range Status   11/26/2023 9.2 8.7 - 10.5 mg/dL Final   08/10/2023 9.1 8.7 - 10.5 mg/dL Final     Phosphorus   Date Value Ref Range Status   08/10/2023 4.4 2.7 - 4.5 mg/dL Final   07/26/2023 4.3 2.7 - 4.5 mg/dL Final     Vit D, 25-Hydroxy   Date Value Ref Range Status   03/27/2023 60 30 - 96 ng/mL Final     Comment:     Vitamin D deficiency.........<10 ng/mL                              Vitamin D insufficiency......10-29 ng/mL       Vitamin D sufficiency........> or  equal to 30 ng/mL  Vitamin D toxicity............>100 ng/mL

## 2023-11-27 NOTE — ASSESSMENT & PLAN NOTE
ESRD, on HD, MWF   Last iHD;11/24  Residual urine; 2-3 times daily  Access; LUE AVG (declot surgery on 11/30)  Dry Wt; 107kg    Recommendations;  - No need for emergent RRT, will have her normal schedule after replacement of the central line, declotting surgery on 11/30 by Dr Dewey.  -I/Os  -Renal diet      Anemia in ESRD;  Hb target; 10-12  .  Hemoglobin   Date Value Ref Range Status   11/27/2023 10.4 (L) 12.0 - 16.0 g/dL Final   11/26/2023 10.8 (L) 12.0 - 16.0 g/dL Final   08/10/2023 10.9 (L) 12.0 - 16.0 g/dL Final     Saturated Iron   Date Value Ref Range Status   05/16/2023 29 20 - 50 % Final   03/16/2023 14 (L) 20 - 50 % Final   01/27/2023 12 (L) 20 - 50 % Final     Ferritin   Date Value Ref Range Status   06/05/2023 475 (H) 20.0 - 300.0 ng/mL Final   05/16/2023 556 (H) 20.0 - 300.0 ng/mL Final   03/16/2023 121 20.0 - 300.0 ng/mL Final     Iron   Date Value Ref Range Status   05/16/2023 77 30 - 160 ug/dL Final   03/16/2023 43 30 - 160 ug/dL Final   01/27/2023 40 30 - 160 ug/dL Final     TIBC   Date Value Ref Range Status   05/16/2023 268 250 - 450 ug/dL Final   03/16/2023 308 250 - 450 ug/dL Final     Vitamin B-12   Date Value Ref Range Status   07/31/2019 412 210 - 950 pg/mL Final        Mineral Bone Disease;  PTH, PO4, S. Vit D    PTH, Intact   Date Value Ref Range Status   08/10/2023 160.7 (H) 9.0 - 77.0 pg/mL Final   07/10/2023 153.0 (H) 9.0 - 77.0 pg/mL Final     Calcium   Date Value Ref Range Status   11/27/2023 8.6 (L) 8.7 - 10.5 mg/dL Final   11/26/2023 9.2 8.7 - 10.5 mg/dL Final     Phosphorus   Date Value Ref Range Status   11/27/2023 4.1 2.7 - 4.5 mg/dL Final   08/10/2023 4.4 2.7 - 4.5 mg/dL Final     Vit D, 25-Hydroxy   Date Value Ref Range Status   03/27/2023 60 30 - 96 ng/mL Final     Comment:     Vitamin D deficiency.........<10 ng/mL                              Vitamin D insufficiency......10-29 ng/mL       Vitamin D sufficiency........> or equal to 30 ng/mL  Vitamin D toxicity............>100  ng/mL

## 2023-11-27 NOTE — HOSPITAL COURSE
Kathie Quezada is placed in  Observation for management of dislodged right chest permacath. She is scheduled for LUE AVG declot on 11/30 with Dr. Tavera. Nephrology following for HD needs. Discussion with Vascular to move declot sooner vs new temporary line with Anesthesia. 11/28 IR to place TDC. TDC placed, successful trial of HD today. She will discharge with PCP follow up. She is scheduled for outpatient declot tomorrow of LUE AVG. Return precautions provided. Patient medically ready for discharge. Plan of care discussed with patient, patient agreeable to plan, and all questions answered.

## 2023-11-27 NOTE — SUBJECTIVE & OBJECTIVE
Interval History: NAEON, VSSAF. Nephrology following for HD needs. Discussion with Vascular to move declot sooner vs new temporary line with Anesthesia.      Review of Systems   Constitutional:  Negative for activity change, chills and fever.   HENT:  Negative for trouble swallowing.    Eyes:  Negative for photophobia and visual disturbance.   Respiratory:  Negative for cough, chest tightness and shortness of breath.    Cardiovascular:  Negative for chest pain, palpitations and leg swelling.   Gastrointestinal:  Negative for abdominal pain, constipation, diarrhea, nausea and vomiting.   Genitourinary:  Positive for decreased urine volume (urinates 2-3 times daily). Negative for dysuria, frequency and hematuria.   Musculoskeletal:  Negative for back pain, gait problem and neck pain.   Skin:  Negative for rash and wound.   Neurological:  Negative for dizziness, syncope, speech difficulty and light-headedness.   Psychiatric/Behavioral:  Negative for agitation and confusion. The patient is not nervous/anxious.      Objective:     Vital Signs (Most Recent):  Temp: 98.2 °F (36.8 °C) (11/27/23 0850)  Pulse: 76 (11/27/23 0850)  Resp: 15 (11/27/23 0850)  BP: (!) 143/68 (11/27/23 0850)  SpO2: 99 % (11/27/23 0850) Vital Signs (24h Range):  Temp:  [97.9 °F (36.6 °C)-99.4 °F (37.4 °C)] 98.2 °F (36.8 °C)  Pulse:  [73-90] 76  Resp:  [15-20] 15  SpO2:  [94 %-99 %] 99 %  BP: (136-176)/(59-92) 143/68     Weight: 108 kg (238 lb)  Body mass index is 34.15 kg/m².    Intake/Output Summary (Last 24 hours) at 11/27/2023 0939  Last data filed at 11/26/2023 2137  Gross per 24 hour   Intake 237 ml   Output --   Net 237 ml         Physical Exam  Vitals and nursing note reviewed.   Constitutional:       General: She is not in acute distress.     Appearance: She is well-developed. She is obese.   HENT:      Head: Normocephalic and atraumatic.      Mouth/Throat:      Pharynx: No oropharyngeal exudate.   Eyes:      Conjunctiva/sclera: Conjunctivae  normal.      Pupils: Pupils are equal, round, and reactive to light.   Cardiovascular:      Rate and Rhythm: Normal rate and regular rhythm.      Heart sounds: Normal heart sounds.   Pulmonary:      Effort: Pulmonary effort is normal. No respiratory distress.      Breath sounds: Normal breath sounds. No wheezing.   Chest:      Comments: Dislodged permacath from right chest. Site bandaged, no bleeding/oozing.   Abdominal:      General: Bowel sounds are normal. There is no distension.      Palpations: Abdomen is soft.      Tenderness: There is no abdominal tenderness.   Musculoskeletal:         General: No tenderness. Normal range of motion.      Cervical back: Normal range of motion and neck supple.   Lymphadenopathy:      Cervical: No cervical adenopathy.   Skin:     General: Skin is warm and dry.      Capillary Refill: Capillary refill takes less than 2 seconds.      Findings: No rash.   Neurological:      Mental Status: She is alert and oriented to person, place, and time.      Cranial Nerves: No cranial nerve deficit.      Sensory: No sensory deficit.      Coordination: Coordination normal.   Psychiatric:         Behavior: Behavior normal.         Thought Content: Thought content normal.         Judgment: Judgment normal.             Significant Labs: All pertinent labs within the past 24 hours have been reviewed.  CBC:   Recent Labs   Lab 11/26/23  1044 11/27/23  0750   WBC 9.54 9.16   HGB 10.8* 10.4*   HCT 33.3* 31.6*    139*     CMP:   Recent Labs   Lab 11/26/23  1044 11/27/23  0750    144   K 3.5 4.0    107   CO2 26 28    99   BUN 42* 45*   CREATININE 4.2* 4.2*   CALCIUM 9.2 8.6*   PROT 7.8 6.6   ALBUMIN 3.7 3.1*   BILITOT 0.7 0.4   ALKPHOS 83 84   AST 13 14   ALT 7* 8*   ANIONGAP 12 9       Significant Imaging: I have reviewed all pertinent imaging results/findings within the past 24 hours.

## 2023-11-27 NOTE — SUBJECTIVE & OBJECTIVE
Interval History:   No complaints this afternoon, awaiting vascular sx evaluation.  IR has been consulted for TDC placement.    Labs non-emergent, oxygenating well on RA.    Review of patient's allergies indicates:   Allergen Reactions    Atorvastatin Other (See Comments)     Statin induced myalgia    Mushroom Hives    Peanut Anaphylaxis    Bactrim [sulfamethoxazole-trimethoprim] Hives    Gabapentin      Pruritis     Iodine     Peanut butter flavor     Penicillins Hives    Shellfish containing products Itching    Sulfa (sulfonamide antibiotics) Hives     Current Facility-Administered Medications   Medication Frequency    acetaminophen tablet 650 mg Q4H PRN    albuterol-ipratropium 2.5 mg-0.5 mg/3 mL nebulizer solution 3 mL Q4H PRN    calcium acetate(phosphat bind) capsule 1,334 mg TID WM    cholecalciferol (vitamin D3) 125 mcg (5,000 unit) tablet 5,000 Units Daily    dextrose 10% bolus 125 mL 125 mL PRN    dextrose 10% bolus 250 mL 250 mL PRN    EScitalopram oxalate tablet 10 mg QHS    ferrous gluconate tablet 324 mg Daily with breakfast    glucagon (human recombinant) injection 1 mg PRN    glucose chewable tablet 16 g PRN    glucose chewable tablet 24 g PRN    heparin (porcine) injection 5,000 Units Q8H    hydrALAZINE tablet 50 mg Q12H    insulin aspart U-100 pen 0-5 Units QID (AC + HS) PRN    levothyroxine tablet 200 mcg Before breakfast    losartan tablet 100 mg Daily    melatonin tablet 6 mg Nightly PRN    naloxone 0.4 mg/mL injection 0.02 mg PRN    NIFEdipine 24 hr tablet 60 mg Daily    ondansetron disintegrating tablet 8 mg Q8H PRN    polyethylene glycol packet 17 g Daily PRN    promethazine tablet 25 mg Q6H PRN    sodium chloride 0.9% flush 10 mL PRN    sodium chloride 0.9% flush 10 mL Q12H PRN    traZODone tablet 100 mg Nightly PRN       Objective:     Vital Signs (Most Recent):  Temp: 98.2 °F (36.8 °C) (11/27/23 0850)  Pulse: 79 (11/27/23 1525)  Resp: 17 (11/27/23 1346)  BP: 127/67 (11/27/23 1346)  SpO2: 99  % (11/27/23 1346) Vital Signs (24h Range):  Temp:  [97.9 °F (36.6 °C)-98.2 °F (36.8 °C)] 98.2 °F (36.8 °C)  Pulse:  [73-85] 79  Resp:  [15-18] 17  SpO2:  [94 %-99 %] 99 %  BP: (127-148)/(59-70) 127/67     Weight: 108 kg (238 lb) (11/26/23 1009)  Body mass index is 34.15 kg/m².  Body surface area is 2.31 meters squared.    I/O last 3 completed shifts:  In: 237 [P.O.:237]  Out: -      Physical Exam  Vitals and nursing note reviewed.   Constitutional:       General: She is not in acute distress.     Appearance: She is well-developed. She is obese.   HENT:      Head: Normocephalic and atraumatic.      Mouth/Throat:      Pharynx: No oropharyngeal exudate.   Eyes:      Conjunctiva/sclera: Conjunctivae normal.      Pupils: Pupils are equal, round, and reactive to light.   Cardiovascular:      Rate and Rhythm: Normal rate and regular rhythm.      Heart sounds: Normal heart sounds.   Pulmonary:      Effort: Pulmonary effort is normal. No respiratory distress.      Breath sounds: Normal breath sounds. No wheezing.   Chest:      Comments: Dislodged permacath from right chest. Site bandaged, no bleeding/oozing.   Abdominal:      General: Bowel sounds are normal. There is no distension.      Palpations: Abdomen is soft.      Tenderness: There is no abdominal tenderness.   Musculoskeletal:         General: No tenderness. Normal range of motion.      Cervical back: Normal range of motion and neck supple.   Lymphadenopathy:      Cervical: No cervical adenopathy.   Skin:     General: Skin is warm and dry.      Capillary Refill: Capillary refill takes less than 2 seconds.      Findings: No rash.   Neurological:      Mental Status: She is alert and oriented to person, place, and time.      Cranial Nerves: No cranial nerve deficit.      Sensory: No sensory deficit.      Coordination: Coordination normal.   Psychiatric:         Behavior: Behavior normal.         Thought Content: Thought content normal.         Judgment: Judgment normal.           Significant Labs:  CBC:   Recent Labs   Lab 11/27/23  0750   WBC 9.16   RBC 3.71*   HGB 10.4*   HCT 31.6*   *   MCV 85   MCH 28.0   MCHC 32.9     CMP:   Recent Labs   Lab 11/27/23  0750   GLU 99   CALCIUM 8.6*   ALBUMIN 3.1*   PROT 6.6      K 4.0   CO2 28      BUN 45*   CREATININE 4.2*   ALKPHOS 84   ALT 8*   AST 14   BILITOT 0.4

## 2023-11-27 NOTE — CONSULTS
[facility]  Nephrology  Consult Note    Patient Name: Kathie Quezada  MRN: [id number]  Admission Date: 11/26/2023  Hospital Length of Stay: 0 days  Attending Provider: Dilia Hernandez MD   Primary Care Physician: Elan Price MD  Principal Problem:Problem with vascular access    Inpatient consult to Nephrology  Consult performed by: Tracy Gaming MD  Consult ordered by: Stepan Nuñez, ASIA        Subjective:     HPI: Kathie Quezada is a 63 y.o. female with PMHx of ESRD on HD, CHF, HTN, HLD, T2DM, Factor XI deficiency who [place] [facility] [place] of vascular access problem. Right upper chest permacath reportedly came out overnight. She reports oozing to site, denies associated pain. He last full HD session was two days prior. She denies associated HA, fever, chills, shortness of breath, chest pain, abdominal pain, n/v/d, urinary symptoms, numbness or tingling. Of note, she is scheduled for LUE AVG declot on 11/30 with Dr. Tavera.     Last iHD session; 11/24      Past Medical History:   Diagnosis Date    Acute respiratory failure with hypoxia and hypercapnia 05/26/2019    ATN (acute tubular necrosis) 02/08/2019    Chronic diastolic heart failure 02/07/2019    2-8-19 Mild left atrial enlargement. Mild left ventricular enlargement. Normal left ventricular systolic function. The estimated ejection fraction is 55% Indeterminate left ventricular diastolic function. Normal right ventricular systolic function. Mild mitral regurgitation. Mild to moderate tricuspid regurgitation. The estimated PA systolic pressure is 32 mm Hg Normal central venous pressure (3 m    CKD (chronic kidney disease) stage 4, GFR 15-29 ml/min 05/16/2018    CKD stage G3a/A3, GFR 45-59 and albumin creatinine ratio >300 mg/g 05/16/2018    CKD stage G3b/A3, GFR 30-44 and albumin creatinine ratio >300 mg/g 05/16/2018    Closed compression fracture of L2 lumbar vertebra 05/24/2019    Closed compression fracture of  L2 lumbar vertebra 05/24/2019     IMO Regulatory Load October 2019    Controlled type 2 diabetes with retinopathy 04/09/2018    COVID-19 virus infection 7-1-2021 07/01/2021    CVA (cerebral vascular accident) 2017    Diabetic polyneuropathy associated with type 2 diabetes mellitus 12/04/2020    Encounter for blood transfusion     Essential hypertension 04/09/2018    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  7-31-19 Factor XI Activity 55 - 145 % 96       Gastroesophageal reflux disease without esophagitis 05/24/2019    Generalized anxiety disorder 11/08/2019    GERD (gastroesophageal reflux disease)     GIB (gastrointestinal bleeding) 10/18/2018    History of CVA (cerebrovascular accident) without residual deficits 11/27/2022    History of hepatitis C     s/p succesful Rx w/ Harvoni  - SVR12 (cure) 2016    Hypertensive retinopathy, bilateral 12/10/2019    Mild nonproliferative diabetic retinopathy 05/02/2018    Mixed hyperlipidemia 04/09/2018    Myalgia due to statin 09/07/2022    Normocytic anemia 01/14/2022    NS (nuclear sclerosis), bilateral 12/10/2019    Peanut allergy 02/04/2023    PNA (pneumonia) 05/28/2019    Postoperative hypothyroidism     Renovascular hypertension 04/09/2018    S/P kyphoplasty 08/14/2019 8/14/19: L2 kyphoplasty with Dr. Kwon     Slow transit constipation 05/27/2019    Type 2 diabetes mellitus with circulatory disorder, with long-term current use of insulin 04/09/2018    Type 2 diabetes mellitus with stage 3 chronic kidney disease, without long-term current use of insulin 04/09/2018    Type 2 diabetes mellitus with stage 3a chronic kidney disease, without long-term current use of insulin 03/03/2021    Type 2 diabetes mellitus with stage 3b chronic kidney disease, without long-term current use of insulin 09/07/2022       Past Surgical History:   Procedure Laterality Date    AV FISTULA PLACEMENT Left 10/27/2022    Procedure: CREATION, AV FISTULA;  Surgeon: Tirso CHRISTINE  MD Liang;  [facility];  Service: Peripheral Vascular;  Laterality: Left;  Brachio basilic 1st stage     BACK SURGERY  2017    CHOLECYSTECTOMY  2015    [facility]    COLONOSCOPY  11/13/2015    Dr Chino torres prep. internal hemorrhoids. diverticulosis of sigmois. NO polyps. repeat due 2025    FISTULOGRAM N/A 12/16/2022    Procedure: Fistulogram;  Surgeon: Tirso Tavera MD;  Location: [facility] CATH LAB;  Service: Peripheral Vascular;  Laterality: N/A;    FISTULOGRAM, WITH PTA Left 3/23/2023    Procedure: FISTULOGRAM, WITH PTA;  Surgeon: Tirso Tavera MD;  Location: [facility] OR [facility] FLR;  Service: Peripheral Vascular;  Laterality: Left;  LUE TRANSRADIAL CO2 FISTULOGRAM    1.9 min  22.99 mGy  4.3032 Gy.cm  200cc CO2  9ml TransRadial Solution    FISTULOGRAM, WITH PTA Left 8/15/2023    Procedure: FISTULOGRAM, WITH PTA TRANSRADIAL;  Surgeon: Tirso Tavera MD;  [facility];  Service: Peripheral Vascular;  Laterality: Left;  Fluoro: 5.1 min, mGy: 7.95, Gycm2: 1.2626, contrast: 12ml    FIXATION KYPHOPLASTY Bilateral 08/14/2019    Procedure: L2 kyphoplasty Fluoro Globus;  Surgeon: Jeremie Kwon DO;  Location: [facility];  Service: Neurosurgery;  Laterality: Bilateral;  GLOBUS MACRINA WINSLOW [phone] TEXTED HER @ 10:54AM ON 7-30-19  PRE-OP-BY RN  8-7-2019    HYSTERECTOMY      OOPHORECTOMY  1996    one    PERCUTANEOUS TRANSLUMINAL ANGIOPLASTY OF ARTERIOVENOUS FISTULA Left 12/16/2022    Procedure: PTA, AV FISTULA;  Surgeon: Tirso Tavera MD;  Location: [facility] CATH LAB;  Service: Peripheral Vascular;  Laterality: Left;    RIGHT HEART CATHETERIZATION Right 6/15/2023    Procedure: INSERTION, CATHETER, RIGHT HEART;  Surgeon: Lucretia Rivas MD;  Location: [facility] CATH LAB;  Service: Cardiology;  Laterality: Right;    THYROIDECTOMY  2016    at [facility] for thyroid nodule    TONSILLECTOMY      TRANSPOSITION OF BASILIC VEIN Left 1/31/2023    Procedure: TRANSPOSITION, VEIN, BASILIC;  Surgeon: Tirso Tavera MD;   Location: [facility] OR [address] [place] [address];  Service: Peripheral Vascular;  Laterality: Left;       Review of patient's allergies indicates:   Allergen Reactions    Atorvastatin Other (See Comments)     Statin induced myalgia    Mushroom Hives    Peanut Anaphylaxis    Bactrim [sulfamethoxazole-trimethoprim] Hives    Gabapentin      Pruritis     Iodine     Peanut butter flavor     Penicillins Hives    Shellfish containing products Itching    Sulfa (sulfonamide antibiotics) Hives     Current Facility-Administered Medications   Medication Frequency    acetaminophen tablet 650 mg Q4H PRN    albuterol-ipratropium 2.5 mg-0.5 mg/3 mL nebulizer solution 3 mL Q4H PRN    calcium acetate(phosphat bind) capsule 1,334 mg TID WM    cholecalciferol (vitamin D3) 125 mcg (5,000 unit) tablet 5,000 Units Daily    dextrose 10% bolus 125 mL 125 mL PRN    dextrose 10% bolus 250 mL 250 mL PRN    EScitalopram oxalate tablet 10 mg QHS    [START ON 11/27/2023] ferrous gluconate tablet 324 mg Daily with breakfast    glucagon (human recombinant) injection 1 mg PRN    glucose chewable tablet 16 g PRN    glucose chewable tablet 24 g PRN    heparin (porcine) injection 5,000 Units Q8H    hydrALAZINE tablet 50 mg Q12H    insulin aspart U-100 pen 0-5 Units QID (AC + HS) PRN    [START ON 11/27/2023] levothyroxine tablet 200 mcg Before breakfast    losartan tablet 100 mg Daily    melatonin tablet 6 mg Nightly PRN    naloxone 0.4 mg/mL injection 0.02 mg PRN    NIFEdipine 24 hr tablet 60 mg Daily    ondansetron disintegrating tablet 8 mg Q8H PRN    polyethylene glycol packet 17 g Daily PRN    promethazine tablet 25 mg Q6H PRN    sodium chloride 0.9% flush 10 mL PRN    sodium chloride 0.9% flush 10 mL Q12H PRN    traZODone tablet 100 mg Nightly PRN     Family History       Problem Relation (Age of Onset)    Aneurysm Brother    Coronary artery disease Mother    Diabetes type II Mother    Heart disease Mother    Hypertension Mother, Father    Prostate cancer Father     Stroke Mother          Tobacco Use    Smoking status: Never     Passive exposure: Past    Smokeless tobacco: Never   Substance and Sexual Activity    Alcohol use: Not Currently    Drug use: No    Sexual activity: Not Currently     Review of Systems   Constitutional: Negative.    HENT: Negative.     Eyes: Negative.    Respiratory:          On room air   Genitourinary:         Usually urinates 2-3 times   Musculoskeletal:         No edema in lower extremities     Objective:     Vital Signs (Most Recent):  Temp: 97.9 °F (36.6 °C) (11/26/23 1747)  Pulse: 79 (11/26/23 1747)  Resp: 17 (11/26/23 1747)  BP: (!) 148/70 (11/26/23 1747)  SpO2: 98 % (11/26/23 1747) Vital Signs (24h Range):  Temp:  [97.9 °F (36.6 °C)-99.4 °F (37.4 °C)] 97.9 °F (36.6 °C)  Pulse:  [77-90] 79  Resp:  [17-20] 17  SpO2:  [97 %-99 %] 98 %  BP: (144-176)/(67-92) 148/70     Weight: 108 kg (238 lb) (11/26/23 1009)  Body mass index is 34.15 kg/m².  Body surface area is 2.31 meters squared.    No intake/output data recorded.     Physical Exam  Constitutional:       Appearance: She is obese.   Pulmonary:      Effort: Pulmonary effort is normal.   Abdominal:      Palpations: Abdomen is soft.   Musculoskeletal:         General: No swelling.      Right lower leg: No edema.      Left lower leg: No edema.   Skin:     General: Skin is warm.   Neurological:      General: No focal deficit present.      Mental Status: She is alert.   Psychiatric:         Mood and Affect: Mood normal.         Behavior: Behavior normal.          Significant Labs:  BMP:   Recent Labs   Lab 11/26/23  1044         K 3.5      CO2 26   BUN 42*   CREATININE 4.2*   CALCIUM 9.2     CBC:   Recent Labs   Lab 11/26/23  1044   WBC 9.54   RBC 3.93*   HGB 10.8*   HCT 33.3*      MCV 85   MCH 27.5   MCHC 32.4       Significant Imaging:  Labs: Reviewed  Assessment/Plan:     Cardiac/Vascular  * Problem with vascular access  NITISH HOLGUIN clot  Decloting on 11/30 by   Zuleima    Renal/  ESRD on dialysis  ESRD, on HD, MWF   Last iHD;11/24  Residual urine; 2-3 times daily  Access; LUE AVG (declot surgery on 11/30), Right central line(malpositioned from original location)  Dry Wt; 107kg    Recommendations;  - No need for emergent RRT, will have her normal schedule after replacement of the central line, declotting surgery on 11/30 by Dr Dweey.  -I/Os  -Renal diet      Anemia in ESRD;  Hb target; 10-12  .  Hemoglobin   Date Value Ref Range Status   11/26/2023 10.8 (L) 12.0 - 16.0 g/dL Final   08/10/2023 10.9 (L) 12.0 - 16.0 g/dL Final   07/26/2023 9.6 (L) 12.0 - 16.0 g/dL Final     Saturated Iron   Date Value Ref Range Status   05/16/2023 29 20 - 50 % Final   03/16/2023 14 (L) 20 - 50 % Final   01/27/2023 12 (L) 20 - 50 % Final     Ferritin   Date Value Ref Range Status   06/05/2023 475 (H) 20.0 - 300.0 ng/mL Final   05/16/2023 556 (H) 20.0 - 300.0 ng/mL Final   03/16/2023 121 20.0 - 300.0 ng/mL Final     Iron   Date Value Ref Range Status   05/16/2023 77 30 - 160 ug/dL Final   03/16/2023 43 30 - 160 ug/dL Final   01/27/2023 40 30 - 160 ug/dL Final     TIBC   Date Value Ref Range Status   05/16/2023 268 250 - 450 ug/dL Final   03/16/2023 308 250 - 450 ug/dL Final     Vitamin B-12   Date Value Ref Range Status   07/31/2019 412 210 - 950 pg/mL Final        Mineral Bone Disease;  PTH, PO4, S. Vit D    PTH, Intact   Date Value Ref Range Status   08/10/2023 160.7 (H) 9.0 - 77.0 pg/mL Final   07/10/2023 153.0 (H) 9.0 - 77.0 pg/mL Final     Calcium   Date Value Ref Range Status   11/26/2023 9.2 8.7 - 10.5 mg/dL Final   08/10/2023 9.1 8.7 - 10.5 mg/dL Final     Phosphorus   Date Value Ref Range Status   08/10/2023 4.4 2.7 - 4.5 mg/dL Final   07/26/2023 4.3 2.7 - 4.5 mg/dL Final     Vit D, 25-Hydroxy   Date Value Ref Range Status   03/27/2023 60 30 - 96 ng/mL Final     Comment:     Vitamin D deficiency.........<10 ng/mL                              Vitamin D insufficiency......10-29 ng/mL        Vitamin D sufficiency........> or equal to 30 ng/mL  Vitamin D toxicity............>100 ng/mL              Thank you for your consult. I will follow-up with patient. Please contact us if you have any additional questions.    Tracy Gaming MD  Nephrology  [facility] - Observation 11H

## 2023-11-28 LAB
ALBUMIN SERPL BCP-MCNC: 3.3 G/DL (ref 3.5–5.2)
ALP SERPL-CCNC: 80 U/L (ref 55–135)
ALT SERPL W/O P-5'-P-CCNC: 7 U/L (ref 10–44)
ANION GAP SERPL CALC-SCNC: 11 MMOL/L (ref 8–16)
AST SERPL-CCNC: 12 U/L (ref 10–40)
BASOPHILS # BLD AUTO: 0.05 K/UL (ref 0–0.2)
BASOPHILS NFR BLD: 0.6 % (ref 0–1.9)
BILIRUB SERPL-MCNC: 0.5 MG/DL (ref 0.1–1)
BUN SERPL-MCNC: 48 MG/DL (ref 8–23)
CALCIUM SERPL-MCNC: 8.8 MG/DL (ref 8.7–10.5)
CHLORIDE SERPL-SCNC: 108 MMOL/L (ref 95–110)
CO2 SERPL-SCNC: 25 MMOL/L (ref 23–29)
CREAT SERPL-MCNC: 4.1 MG/DL (ref 0.5–1.4)
DIFFERENTIAL METHOD: ABNORMAL
EOSINOPHIL # BLD AUTO: 0.2 K/UL (ref 0–0.5)
EOSINOPHIL NFR BLD: 1.9 % (ref 0–8)
ERYTHROCYTE [DISTWIDTH] IN BLOOD BY AUTOMATED COUNT: 13.2 % (ref 11.5–14.5)
EST. GFR  (NO RACE VARIABLE): 11.8 ML/MIN/1.73 M^2
GLUCOSE SERPL-MCNC: 99 MG/DL (ref 70–110)
HCT VFR BLD AUTO: 33.8 % (ref 37–48.5)
HGB BLD-MCNC: 10.7 G/DL (ref 12–16)
IMM GRANULOCYTES # BLD AUTO: 0.02 K/UL (ref 0–0.04)
IMM GRANULOCYTES NFR BLD AUTO: 0.2 % (ref 0–0.5)
LYMPHOCYTES # BLD AUTO: 2.2 K/UL (ref 1–4.8)
LYMPHOCYTES NFR BLD: 25.8 % (ref 18–48)
MAGNESIUM SERPL-MCNC: 2 MG/DL (ref 1.6–2.6)
MCH RBC QN AUTO: 27.1 PG (ref 27–31)
MCHC RBC AUTO-ENTMCNC: 31.7 G/DL (ref 32–36)
MCV RBC AUTO: 86 FL (ref 82–98)
MONOCYTES # BLD AUTO: 0.6 K/UL (ref 0.3–1)
MONOCYTES NFR BLD: 6.7 % (ref 4–15)
NEUTROPHILS # BLD AUTO: 5.4 K/UL (ref 1.8–7.7)
NEUTROPHILS NFR BLD: 64.8 % (ref 38–73)
NRBC BLD-RTO: 0 /100 WBC
PHOSPHATE SERPL-MCNC: 3.8 MG/DL (ref 2.7–4.5)
PLATELET # BLD AUTO: 153 K/UL (ref 150–450)
PMV BLD AUTO: 11.3 FL (ref 9.2–12.9)
POCT GLUCOSE: 73 MG/DL (ref 70–110)
POCT GLUCOSE: 90 MG/DL (ref 70–110)
POTASSIUM SERPL-SCNC: 3.7 MMOL/L (ref 3.5–5.1)
PROT SERPL-MCNC: 7 G/DL (ref 6–8.4)
RBC # BLD AUTO: 3.95 M/UL (ref 4–5.4)
SODIUM SERPL-SCNC: 144 MMOL/L (ref 136–145)
WBC # BLD AUTO: 8.37 K/UL (ref 3.9–12.7)

## 2023-11-28 PROCEDURE — 36415 COLL VENOUS BLD VENIPUNCTURE: CPT | Mod: HCNC,NTX

## 2023-11-28 PROCEDURE — 80053 COMPREHEN METABOLIC PANEL: CPT | Mod: HCNC,NTX

## 2023-11-28 PROCEDURE — G0378 HOSPITAL OBSERVATION PER HR: HCPCS | Mod: HCNC,NTX

## 2023-11-28 PROCEDURE — 83735 ASSAY OF MAGNESIUM: CPT | Mod: HCNC,NTX

## 2023-11-28 PROCEDURE — 25000003 PHARM REV CODE 250: Mod: HCNC,NTX

## 2023-11-28 PROCEDURE — 85025 COMPLETE CBC W/AUTO DIFF WBC: CPT | Mod: HCNC,NTX

## 2023-11-28 PROCEDURE — 84100 ASSAY OF PHOSPHORUS: CPT | Mod: HCNC,NTX

## 2023-11-28 PROCEDURE — 63600175 PHARM REV CODE 636 W HCPCS: Mod: HCNC,NTX | Performed by: RADIOLOGY

## 2023-11-28 RX ORDER — HEPARIN SODIUM 1000 [USP'U]/ML
INJECTION, SOLUTION INTRAVENOUS; SUBCUTANEOUS
Status: COMPLETED | OUTPATIENT
Start: 2023-11-28 | End: 2023-11-28

## 2023-11-28 RX ORDER — ACETAMINOPHEN 500 MG
1000 TABLET ORAL EVERY 8 HOURS PRN
Status: DISCONTINUED | OUTPATIENT
Start: 2023-11-28 | End: 2023-11-29 | Stop reason: HOSPADM

## 2023-11-28 RX ORDER — OXYCODONE HYDROCHLORIDE 5 MG/1
5 TABLET ORAL EVERY 6 HOURS PRN
Status: DISCONTINUED | OUTPATIENT
Start: 2023-11-28 | End: 2023-11-29 | Stop reason: HOSPADM

## 2023-11-28 RX ORDER — FENTANYL CITRATE 50 UG/ML
INJECTION, SOLUTION INTRAMUSCULAR; INTRAVENOUS
Status: COMPLETED | OUTPATIENT
Start: 2023-11-28 | End: 2023-11-28

## 2023-11-28 RX ADMIN — HYDRALAZINE HYDROCHLORIDE 50 MG: 50 TABLET ORAL at 09:11

## 2023-11-28 RX ADMIN — HEPARIN SODIUM 3600 UNITS: 1000 INJECTION, SOLUTION INTRAVENOUS; SUBCUTANEOUS at 04:11

## 2023-11-28 RX ADMIN — FENTANYL CITRATE 100 MCG: 50 INJECTION, SOLUTION INTRAMUSCULAR; INTRAVENOUS at 04:11

## 2023-11-28 RX ADMIN — LOSARTAN POTASSIUM 100 MG: 50 TABLET, FILM COATED ORAL at 09:11

## 2023-11-28 RX ADMIN — NIFEDIPINE 60 MG: 60 TABLET, FILM COATED, EXTENDED RELEASE ORAL at 09:11

## 2023-11-28 RX ADMIN — ESCITALOPRAM OXALATE 10 MG: 10 TABLET ORAL at 09:11

## 2023-11-28 RX ADMIN — CHOLECALCIFEROL TAB 125 MCG (5000 UNIT) 5000 UNITS: 125 TAB at 09:11

## 2023-11-28 RX ADMIN — OXYCODONE HYDROCHLORIDE 5 MG: 5 TABLET ORAL at 09:11

## 2023-11-28 RX ADMIN — LEVOTHYROXINE SODIUM 200 MCG: 100 TABLET ORAL at 06:11

## 2023-11-28 NOTE — ASSESSMENT & PLAN NOTE
ESRD on dialysis  - HD MWF, last session 11/24  - dislodged L chest permacath upon waking up this AM  - Nephro consulted for HD needs  - sees Dr. Tavera (Vascular) - scheduled 11/30 for declot of LUE AVG  - Discussion with Vascular to move declot sooner vs new temporary line with Anesthesia  - 11/28 - IR to place new TDC, then trial HD  - daily RFP  - cont tele

## 2023-11-28 NOTE — PROCEDURES
Radiology Post-Procedure Note    Pre Op Diagnosis: ESRD    Post Op Diagnosis: Same    Procedure: Tunneled HD line placement    Procedure performed by: Ed Velasco MD    Written Informed Consent Obtained: Yes  Specimen Removed: NO  Estimated Blood Loss: Minimal    Findings:   Via rt IJ, 23 cm tip to cuff, 14.5 Fr, dual lumen HD line was placed with tip in RA. Line ready for use. No complications. See dictation.    Patient tolerated procedure well.    Ed Velasco M.D.  Interventional Radiology  Department of Radiology

## 2023-11-28 NOTE — PROGRESS NOTES
[facility] [address] [place] [address] Medicine  Progress Note    Patient Name: Kathie Quezada  MRN: [id number]  Patient Class: OP- Observation   Admission Date: 11/26/2023  Length of Stay: 0 days  Attending Physician: Dilia Hernandez MD  Primary Care Provider: Elan Price MD        Subjective:     Principal Problem:Problem with vascular access        HPI:  Kathie Quezada is a 63 y.o. female with PMHx of ESRD on HD, CHF, HTN, HLD, T2DM, Factor XI deficiency who [place] [facility] [place] for evaluation of vascular access problem. Right upper chest permacath reportedly came out overnight. She reports oozing to site, denies associated pain. He last full HD session was two days prior. She denies associated HA, fever, chills, shortness of breath, chest pain, abdominal pain, n/v/d, urinary symptoms, numbness or tingling. Of note, she is scheduled for LUE AVG declot on 11/30 with Dr. Tavera.     In the ED: Hypertensive, otherwise VSSAF. CBC without leukocytosis. CMP consistent with ESRD. CXR without acute abnormality. Received no meds. Admitted to  for further management.     Overview/Hospital Course:  Kathie Quezada is placed in  Observation for management of dislodged right chest permacath. She is scheduled for LUE AVG declot on 11/30 with Dr. Tavera. Nephrology following for HD needs. Discussion with Vascular to move declot sooner vs new temporary line with Anesthesia. 11/28 IR to place TDC.     Interval History: NAEON, VSSAF. IR to place new TDC today, then trial HD. Nephro following for HD needs.     Review of Systems   Constitutional:  Negative for activity change, chills and fever.   HENT:  Negative for trouble swallowing.    Eyes:  Negative for photophobia and visual disturbance.   Respiratory:  Negative for cough, chest tightness and shortness of breath.    Cardiovascular:  Negative for chest pain, palpitations and leg swelling.   Gastrointestinal:  Negative for abdominal pain, constipation,  diarrhea, nausea and vomiting.   Genitourinary:  Positive for decreased urine volume (urinates 2-3 times daily). Negative for dysuria, frequency and hematuria.   Musculoskeletal:  Negative for back pain, gait problem and neck pain.   Skin:  Negative for rash and wound.   Neurological:  Negative for dizziness, syncope, speech difficulty and light-headedness.   Psychiatric/Behavioral:  Negative for agitation and confusion. The patient is not nervous/anxious.      Objective:     Vital Signs (Most Recent):  Temp: 97 °F (36.1 °C) (11/28/23 1251)  Pulse: 81 (11/28/23 1251)  Resp: 20 (11/28/23 1251)  BP: 125/80 (11/28/23 1251)  SpO2: 99 % (11/28/23 1251) Vital Signs (24h Range):  Temp:  [97 °F (36.1 °C)-98.2 °F (36.8 °C)] 97 °F (36.1 °C)  Pulse:  [69-81] 81  Resp:  [16-20] 20  SpO2:  [94 %-99 %] 99 %  BP: (125-148)/(65-80) 125/80     Weight: 108 kg (238 lb)  Body mass index is 34.15 kg/m².  No intake or output data in the 24 hours ending 11/28/23 1343      Physical Exam  Vitals and nursing note reviewed.   Constitutional:       General: She is not in acute distress.     Appearance: She is well-developed. She is obese.   HENT:      Head: Normocephalic and atraumatic.      Mouth/Throat:      Pharynx: No oropharyngeal exudate.   Eyes:      Conjunctiva/sclera: Conjunctivae normal.      Pupils: Pupils are equal, round, and reactive to light.   Cardiovascular:      Rate and Rhythm: Normal rate and regular rhythm.      Heart sounds: Normal heart sounds.   Pulmonary:      Effort: Pulmonary effort is normal. No respiratory distress.      Breath sounds: Normal breath sounds. No wheezing.   Chest:      Comments: Dislodged permacath from right chest. Site bandaged, no bleeding/oozing.   Abdominal:      General: Bowel sounds are normal. There is no distension.      Palpations: Abdomen is soft.      Tenderness: There is no abdominal tenderness.   Musculoskeletal:         General: No tenderness. Normal range of motion.      Cervical  back: Normal range of motion and neck supple.   Lymphadenopathy:      Cervical: No cervical adenopathy.   Skin:     General: Skin is warm and dry.      Capillary Refill: Capillary refill takes less than 2 seconds.      Findings: No rash.   Neurological:      Mental Status: She is alert and oriented to person, place, and time.      Cranial Nerves: No cranial nerve deficit.      Sensory: No sensory deficit.      Coordination: Coordination normal.   Psychiatric:         Behavior: Behavior normal.         Thought Content: Thought content normal.         Judgment: Judgment normal.             Significant Labs: All pertinent labs within the past 24 hours have been reviewed.  CBC:   Recent Labs   Lab 11/27/23 0750 11/28/23  0605   WBC 9.16 8.37   HGB 10.4* 10.7*   HCT 31.6* 33.8*   * 153     CMP:   Recent Labs   Lab 11/27/23 0750 11/28/23  0605    144   K 4.0 3.7    108   CO2 28 25   GLU 99 99   BUN 45* 48*   CREATININE 4.2* 4.1*   CALCIUM 8.6* 8.8   PROT 6.6 7.0   ALBUMIN 3.1* 3.3*   BILITOT 0.4 0.5   ALKPHOS 84 80   AST 14 12   ALT 8* 7*   ANIONGAP 9 11       Significant Imaging: I have reviewed all pertinent imaging results/findings within the past 24 hours.    Assessment/Plan:      * Problem with vascular access  ESRD on dialysis  - HD MW, last session 11/24  - dislodged L chest permacath upon waking up this AM  - Nephro consulted for HD needs  - sees Dr. Tavera (Vascular) - scheduled 11/30 for declot of LUE AVG  - Discussion with Vascular to move declot sooner vs new temporary line with Anesthesia  - 11/28 - IR to place new TDC, then trial HD  - daily RFP  - cont tele    Metabolic bone disease  - cont phos binder    Iron deficiency anemia  - Hgb 10.8 on admission, near baseline  - cont iron supplementation  - daily CBC  - transfuse if Hgb < 7    Type 2 diabetes mellitus with stage 5 chronic kidney disease not on chronic dialysis, without long-term current use of insulin  - diet controlled  -  LDSSI  - accuchecks    Generalized anxiety disorder  - cont lexapro    Factor XI deficiency  - hx noted    Chronic diastolic heart failure  - hx noted, no acute exacerbation   - HD for volume management   - monitor for signs of overload  - home irbesartan > losartan during admission  - cont tele    Mixed hyperlipidemia  - last lipid panel reviewed  - not on home statin    Postoperative hypothyroidism  - cont home synthroid      VTE Risk Mitigation (From admission, onward)           Ordered     heparin (porcine) injection 5,000 Units  Every 8 hours         11/26/23 1217     IP VTE HIGH RISK PATIENT  Once         11/26/23 1217     Place sequential compression device  Until discontinued         11/26/23 1217     IP VTE HIGH RISK PATIENT  Once         11/26/23 1217     Place sequential compression device  Until discontinued         11/26/23 1217                    Discharge Planning   LINDSEY: 11/29/2023     Code Status: Full Code   Is the patient medically ready for discharge?: No    Reason for patient still in hospital (select all that apply): Patient trending condition, Laboratory test, Treatment, and Consult recommendations  Discharge Plan A: Home                  Stepan Nuñez PA-C  Department of Hospital Medicine   [facility]

## 2023-11-28 NOTE — SUBJECTIVE & OBJECTIVE
Interval History: CUCANICKIE IANJAYA. IR to place new TDC today, then trial HD. Nephro following for HD needs.     Review of Systems   Constitutional:  Negative for activity change, chills and fever.   HENT:  Negative for trouble swallowing.    Eyes:  Negative for photophobia and visual disturbance.   Respiratory:  Negative for cough, chest tightness and shortness of breath.    Cardiovascular:  Negative for chest pain, palpitations and leg swelling.   Gastrointestinal:  Negative for abdominal pain, constipation, diarrhea, nausea and vomiting.   Genitourinary:  Positive for decreased urine volume (urinates 2-3 times daily). Negative for dysuria, frequency and hematuria.   Musculoskeletal:  Negative for back pain, gait problem and neck pain.   Skin:  Negative for rash and wound.   Neurological:  Negative for dizziness, syncope, speech difficulty and light-headedness.   Psychiatric/Behavioral:  Negative for agitation and confusion. The patient is not nervous/anxious.      Objective:     Vital Signs (Most Recent):  Temp: 97 °F (36.1 °C) (11/28/23 1251)  Pulse: 81 (11/28/23 1251)  Resp: 20 (11/28/23 1251)  BP: 125/80 (11/28/23 1251)  SpO2: 99 % (11/28/23 1251) Vital Signs (24h Range):  Temp:  [97 °F (36.1 °C)-98.2 °F (36.8 °C)] 97 °F (36.1 °C)  Pulse:  [69-81] 81  Resp:  [16-20] 20  SpO2:  [94 %-99 %] 99 %  BP: (125-148)/(65-80) 125/80     Weight: 108 kg (238 lb)  Body mass index is 34.15 kg/m².  No intake or output data in the 24 hours ending 11/28/23 1343      Physical Exam  Vitals and nursing note reviewed.   Constitutional:       General: She is not in acute distress.     Appearance: She is well-developed. She is obese.   HENT:      Head: Normocephalic and atraumatic.      Mouth/Throat:      Pharynx: No oropharyngeal exudate.   Eyes:      Conjunctiva/sclera: Conjunctivae normal.      Pupils: Pupils are equal, round, and reactive to light.   Cardiovascular:      Rate and Rhythm: Normal rate and regular rhythm.      Heart  sounds: Normal heart sounds.   Pulmonary:      Effort: Pulmonary effort is normal. No respiratory distress.      Breath sounds: Normal breath sounds. No wheezing.   Chest:      Comments: Dislodged permacath from right chest. Site bandaged, no bleeding/oozing.   Abdominal:      General: Bowel sounds are normal. There is no distension.      Palpations: Abdomen is soft.      Tenderness: There is no abdominal tenderness.   Musculoskeletal:         General: No tenderness. Normal range of motion.      Cervical back: Normal range of motion and neck supple.   Lymphadenopathy:      Cervical: No cervical adenopathy.   Skin:     General: Skin is warm and dry.      Capillary Refill: Capillary refill takes less than 2 seconds.      Findings: No rash.   Neurological:      Mental Status: She is alert and oriented to person, place, and time.      Cranial Nerves: No cranial nerve deficit.      Sensory: No sensory deficit.      Coordination: Coordination normal.   Psychiatric:         Behavior: Behavior normal.         Thought Content: Thought content normal.         Judgment: Judgment normal.             Significant Labs: All pertinent labs within the past 24 hours have been reviewed.  CBC:   Recent Labs   Lab 11/27/23  0750 11/28/23  0605   WBC 9.16 8.37   HGB 10.4* 10.7*   HCT 31.6* 33.8*   * 153     CMP:   Recent Labs   Lab 11/27/23  0750 11/28/23  0605    144   K 4.0 3.7    108   CO2 28 25   GLU 99 99   BUN 45* 48*   CREATININE 4.2* 4.1*   CALCIUM 8.6* 8.8   PROT 6.6 7.0   ALBUMIN 3.1* 3.3*   BILITOT 0.4 0.5   ALKPHOS 84 80   AST 14 12   ALT 8* 7*   ANIONGAP 9 11       Significant Imaging: I have reviewed all pertinent imaging results/findings within the past 24 hours.

## 2023-11-28 NOTE — PLAN OF CARE
Pt arrived to [facility] for hd cath. Pt oriented to unit and staff, Pt safely transferred from stretcher to procedural table. Fall risk reviewed and comfort measures utilized with interventions. Safety strap applied, position pillows to minimize pressure points. Blankets applied. Pt prepped and draped utilizing standard sterile technique. Patient placed on continuous monitoring, as required by sedation policy. Timeouts implemented utilizing standard universal time-out per department and facility policy. CRNA to remain at bedside with continuous monitoring. Pt resting comfortably. Denies pain/discomfort. Will continue to monitor. See flow sheets for monitoring, medication administration, and updates. patient verbalizes understanding.

## 2023-11-29 ENCOUNTER — TELEPHONE (OUTPATIENT)
Dept: VASCULAR SURGERY | Facility: CLINIC | Age: 63
End: 2023-11-29
Payer: MEDICARE

## 2023-11-29 VITALS
DIASTOLIC BLOOD PRESSURE: 62 MMHG | HEART RATE: 76 BPM | TEMPERATURE: 99 F | RESPIRATION RATE: 18 BRPM | SYSTOLIC BLOOD PRESSURE: 129 MMHG | OXYGEN SATURATION: 98 % | WEIGHT: 238 LBS | BODY MASS INDEX: 34.07 KG/M2 | HEIGHT: 70 IN

## 2023-11-29 LAB
ALBUMIN SERPL BCP-MCNC: 3 G/DL (ref 3.5–5.2)
ALP SERPL-CCNC: 73 U/L (ref 55–135)
ALT SERPL W/O P-5'-P-CCNC: 6 U/L (ref 10–44)
ANION GAP SERPL CALC-SCNC: 13 MMOL/L (ref 8–16)
AST SERPL-CCNC: 13 U/L (ref 10–40)
BASOPHILS # BLD AUTO: 0.04 K/UL (ref 0–0.2)
BASOPHILS NFR BLD: 0.5 % (ref 0–1.9)
BILIRUB SERPL-MCNC: 0.5 MG/DL (ref 0.1–1)
BUN SERPL-MCNC: 45 MG/DL (ref 8–23)
CALCIUM SERPL-MCNC: 8.8 MG/DL (ref 8.7–10.5)
CHLORIDE SERPL-SCNC: 109 MMOL/L (ref 95–110)
CO2 SERPL-SCNC: 21 MMOL/L (ref 23–29)
CREAT SERPL-MCNC: 4.1 MG/DL (ref 0.5–1.4)
DIFFERENTIAL METHOD: ABNORMAL
EOSINOPHIL # BLD AUTO: 0.1 K/UL (ref 0–0.5)
EOSINOPHIL NFR BLD: 1.7 % (ref 0–8)
ERYTHROCYTE [DISTWIDTH] IN BLOOD BY AUTOMATED COUNT: 12.8 % (ref 11.5–14.5)
EST. GFR  (NO RACE VARIABLE): 11.8 ML/MIN/1.73 M^2
GLUCOSE SERPL-MCNC: 73 MG/DL (ref 70–110)
HBV CORE AB SERPL QL IA: REACTIVE
HBV SURFACE AB SER-ACNC: 23.24 MIU/ML
HBV SURFACE AB SER-ACNC: REACTIVE M[IU]/ML
HBV SURFACE AG SERPL QL IA: NORMAL
HCT VFR BLD AUTO: 30.2 % (ref 37–48.5)
HGB BLD-MCNC: 9.8 G/DL (ref 12–16)
IMM GRANULOCYTES # BLD AUTO: 0.02 K/UL (ref 0–0.04)
IMM GRANULOCYTES NFR BLD AUTO: 0.2 % (ref 0–0.5)
LYMPHOCYTES # BLD AUTO: 2.2 K/UL (ref 1–4.8)
LYMPHOCYTES NFR BLD: 25.6 % (ref 18–48)
MAGNESIUM SERPL-MCNC: 1.9 MG/DL (ref 1.6–2.6)
MCH RBC QN AUTO: 27.4 PG (ref 27–31)
MCHC RBC AUTO-ENTMCNC: 32.5 G/DL (ref 32–36)
MCV RBC AUTO: 84 FL (ref 82–98)
MONOCYTES # BLD AUTO: 0.6 K/UL (ref 0.3–1)
MONOCYTES NFR BLD: 7.3 % (ref 4–15)
NEUTROPHILS # BLD AUTO: 5.5 K/UL (ref 1.8–7.7)
NEUTROPHILS NFR BLD: 64.7 % (ref 38–73)
NRBC BLD-RTO: 0 /100 WBC
PHOSPHATE SERPL-MCNC: 3.8 MG/DL (ref 2.7–4.5)
PLATELET # BLD AUTO: 113 K/UL (ref 150–450)
PMV BLD AUTO: 11.5 FL (ref 9.2–12.9)
POCT GLUCOSE: 120 MG/DL (ref 70–110)
POCT GLUCOSE: 85 MG/DL (ref 70–110)
POCT GLUCOSE: 86 MG/DL (ref 70–110)
POTASSIUM SERPL-SCNC: 3.9 MMOL/L (ref 3.5–5.1)
PROT SERPL-MCNC: 6.5 G/DL (ref 6–8.4)
RBC # BLD AUTO: 3.58 M/UL (ref 4–5.4)
SODIUM SERPL-SCNC: 143 MMOL/L (ref 136–145)
WBC # BLD AUTO: 8.48 K/UL (ref 3.9–12.7)

## 2023-11-29 PROCEDURE — 85025 COMPLETE CBC W/AUTO DIFF WBC: CPT | Mod: HCNC,NTX

## 2023-11-29 PROCEDURE — 80100016 HC MAINTENANCE HEMODIALYSIS: Mod: HCNC,NTX

## 2023-11-29 PROCEDURE — 86704 HEP B CORE ANTIBODY TOTAL: CPT | Mod: HCNC,NTX | Performed by: HOSPITALIST

## 2023-11-29 PROCEDURE — 63600175 PHARM REV CODE 636 W HCPCS: Mod: HCNC,NTX | Performed by: NURSE PRACTITIONER

## 2023-11-29 PROCEDURE — 80053 COMPREHEN METABOLIC PANEL: CPT | Mod: HCNC,NTX

## 2023-11-29 PROCEDURE — G0378 HOSPITAL OBSERVATION PER HR: HCPCS | Mod: HCNC,NTX

## 2023-11-29 PROCEDURE — 86706 HEP B SURFACE ANTIBODY: CPT | Mod: HCNC,NTX | Performed by: HOSPITALIST

## 2023-11-29 PROCEDURE — 25000003 PHARM REV CODE 250: Mod: HCNC,NTX

## 2023-11-29 PROCEDURE — 90935 HEMODIALYSIS ONE EVALUATION: CPT | Mod: HCNC,NTX,, | Performed by: NURSE PRACTITIONER

## 2023-11-29 PROCEDURE — 36415 COLL VENOUS BLD VENIPUNCTURE: CPT | Mod: HCNC,NTX

## 2023-11-29 PROCEDURE — G0257 UNSCHED DIALYSIS ESRD PT HOS: HCPCS

## 2023-11-29 PROCEDURE — 90935 PR HEMODIALYSIS, ONE EVALUATION: ICD-10-PCS | Mod: HCNC,NTX,, | Performed by: NURSE PRACTITIONER

## 2023-11-29 PROCEDURE — 83735 ASSAY OF MAGNESIUM: CPT | Mod: HCNC,NTX

## 2023-11-29 PROCEDURE — 84100 ASSAY OF PHOSPHORUS: CPT | Mod: HCNC,NTX

## 2023-11-29 PROCEDURE — 87340 HEPATITIS B SURFACE AG IA: CPT | Mod: HCNC,NTX | Performed by: HOSPITALIST

## 2023-11-29 RX ORDER — HEPARIN SODIUM 1000 [USP'U]/ML
1000 INJECTION, SOLUTION INTRAVENOUS; SUBCUTANEOUS
Status: DISCONTINUED | OUTPATIENT
Start: 2023-11-29 | End: 2023-11-29 | Stop reason: HOSPADM

## 2023-11-29 RX ADMIN — OXYCODONE HYDROCHLORIDE 5 MG: 5 TABLET ORAL at 04:11

## 2023-11-29 RX ADMIN — Medication 324 MG: at 07:11

## 2023-11-29 RX ADMIN — HEPARIN SODIUM 1000 UNITS: 1000 INJECTION, SOLUTION INTRAVENOUS; SUBCUTANEOUS at 02:11

## 2023-11-29 RX ADMIN — CALCIUM ACETATE 1334 MG: 667 CAPSULE ORAL at 07:11

## 2023-11-29 RX ADMIN — NIFEDIPINE 60 MG: 60 TABLET, FILM COATED, EXTENDED RELEASE ORAL at 09:11

## 2023-11-29 RX ADMIN — LEVOTHYROXINE SODIUM 200 MCG: 100 TABLET ORAL at 05:11

## 2023-11-29 RX ADMIN — OXYCODONE HYDROCHLORIDE 5 MG: 5 TABLET ORAL at 11:11

## 2023-11-29 RX ADMIN — CHOLECALCIFEROL TAB 125 MCG (5000 UNIT) 5000 UNITS: 125 TAB at 09:11

## 2023-11-29 RX ADMIN — HYDRALAZINE HYDROCHLORIDE 50 MG: 50 TABLET ORAL at 09:11

## 2023-11-29 NOTE — TELEPHONE ENCOUNTER
"Spoke with the pt to determine if she was going to be d/c today in reference to procedure tomorrow.Pt verbalized that she will be d/c after dialysis today.pt reminded not to eat or drink anything after midnight.Also questioned pt in reference to iodine prep.Pt verbalized " I need the prep so you can call it in to the pharmacy here and I will pick it up when I'm d/c".Iodine prep called in to pharmacy here at [facility] per pt request.Spoke with Rochelle.Pt verbalized understanding of information received.  "

## 2023-11-29 NOTE — PLAN OF CARE
[facility]  Discharge Final Note    Primary Care Provider: Elan Price MD    Expected Discharge Date: 11/29/2023    Future Appointments   Date Time Provider Department Center   11/30/2023  8:45 AM 3PREP, [facility]   12/5/2023  2:20 PM Ji Elmore MD [facility]     Pt discharged home with no services.  Ishmael Decker, RN,BSN        Final Discharge Note (most recent)       Final Note - 11/29/23 1608          Final Note    Assessment Type Final Discharge Note     Anticipated Discharge Disposition Home or Self Care     Hospital Resources/Appts/Education Provided Provided patient/caregiver with written discharge plan information;Appointments scheduled and added to AVS;Post-Acute resouces added to AVS        Post-Acute Status    Other Status No Post-Acute Service Needs     Discharge Delays None known at this time                     Important Message from Medicare             Contact Info       Elan Price MD   Specialty: Internal Medicine, Hospitalist   Relationship: PCP - General    [address]  [facility] [address]   Phone: [phone]       Next Steps: Follow up in 1 week(s)

## 2023-11-29 NOTE — PROGRESS NOTES
HD initated, /, uf net goal set  for 2 liters as tolerated. B/P 169/72, pulse 75.at on room air . 100%

## 2023-11-29 NOTE — PLAN OF CARE
Problem: Adult Inpatient Plan of Care  Goal: Plan of Care Review  Outcome: Met  Goal: Patient-Specific Goal (Individualized)  Outcome: Met  Goal: Absence of Hospital-Acquired Illness or Injury  Outcome: Met  Goal: Optimal Comfort and Wellbeing  Outcome: Met  Goal: Readiness for Transition of Care  Outcome: Met     Problem: Infection  Goal: Absence of Infection Signs and Symptoms  Outcome: Met     Problem: Diabetes Comorbidity  Goal: Blood Glucose Level Within Targeted Range  Outcome: Met     Problem: Fall Injury Risk  Goal: Absence of Fall and Fall-Related Injury  Outcome: Met     Problem: Device-Related Complication Risk (Hemodialysis)  Goal: Safe, Effective Therapy Delivery  Outcome: Met     Problem: Hemodynamic Instability (Hemodialysis)  Goal: Effective Tissue Perfusion  Outcome: Met     Problem: Infection (Hemodialysis)  Goal: Absence of Infection Signs and Symptoms  Outcome: Met     Problem: Adjustment to Illness (Chronic Kidney Disease)  Goal: Optimal Coping with Chronic Illness  Outcome: Met     Problem: Electrolyte Imbalance (Chronic Kidney Disease)  Goal: Electrolyte Balance  Outcome: Met     Problem: Fluid Volume Excess (Chronic Kidney Disease)  Goal: Fluid Balance  Outcome: Met     Problem: Functional Decline (Chronic Kidney Disease)  Goal: Optimal Functional Ability  Outcome: Met     Problem: Hematologic Alteration (Chronic Kidney Disease)  Goal: Absence of Anemia Signs and Symptoms  Outcome: Met     Problem: Oral Intake Inadequate (Chronic Kidney Disease)  Goal: Optimal Oral Intake  Outcome: Met     Problem: Pain (Chronic Kidney Disease)  Goal: Acceptable Pain Control  Outcome: Met     Problem: Renal Function Impairment (Chronic Kidney Disease)  Goal: Minimize Renal Failure Effects  Outcome: Met

## 2023-11-29 NOTE — DISCHARGE SUMMARY
[facility] Observ[facility] [address] [place] [address] Medicine  Discharge Summary      Patient Name: Kathie Quezada  MRN: [id number]  BLAYNE: [id number]  Patient Class: OP- Observation  Admission Date: 11/26/2023  Hospital Length of Stay: 0 days  Discharge Date and Time:  11/29/2023 3:35 PM  Attending Physician: Dilia Hernandez MD   Discharging Provider: Stepan Nuñez PA-C  Primary Care Provider: Elan Price MD  Hospital Medicine Team: [facility] Stepan Nuñez PA-C  Primary Care Team: [facility]    HPI:   Kathie Quezada is a 63 y.o. female with PMHx of ESRD on HD, CHF, HTN, HLD, T2DM, Factor XI deficiency who presents to [facility] ED for evaluation of vascular access problem. Right upper chest permacath reportedly came out overnight. She reports oozing to site, denies associated pain. He last full HD session was two days prior. She denies associated HA, fever, chills, shortness of breath, chest pain, abdominal pain, n/v/d, urinary symptoms, numbness or tingling. Of note, she is scheduled for LUE AVG declot on 11/30 with Dr. Tavera.     In the ED: Hypertensive, otherwise VSSAF. CBC without leukocytosis. CMP consistent with ESRD. CXR without acute abnormality. Received no meds. Admitted to  for further management.     * No surgery found *      Hospital Course:   Kathie Quezada is placed in  Observation for management of dislodged right chest permacath. She is scheduled for LUE AVG declot on 11/30 with Dr. Tavera. Nephrology following for HD needs. Discussion with Vascular to move declot sooner vs new temporary line with Anesthesia. 11/28 IR to place TDC. TDC placed, successful trial of HD today. She will discharge with PCP follow up. She is scheduled for outpatient declot tomorrow of LUE AVG. Return precautions provided. Patient medically ready for discharge. Plan of care discussed with patient, patient agreeable to plan, and all questions answered.       Goals of Care Treatment Preferences:  Code  Status: Full Code          What is most important right now is to focus on extending life as long as possible, even it it means sacrificing quality.  Accordingly, we have decided that the best plan to meet the patient's goals includes continuing with treatment.      Consults:   Consults (From admission, onward)          Status Ordering Provider     Inpatient consult to Interventional Radiology  Once        Provider:  (Not yet assigned)    Completed WILLIS MEAD     Inpatient consult to Vascular Surgery  Once        Provider:  (Not yet assigned)    Completed WILLIS MEAD     Inpatient consult to Nephrology  Once        Provider:  (Not yet assigned)    Completed WILLIS MEAD            No new Assessment & Plan notes have been filed under this hospital service since the last note was generated.  Service: Hospital Medicine    Final Active Diagnoses:    Diagnosis Date Noted POA    PRINCIPAL PROBLEM:  Problem with vascular access [Z78.9] 11/26/2023 Yes    ESRD on dialysis [N18.6, Z99.2] 09/20/2023 Not Applicable    Metabolic bone disease [M89.8X9] 02/04/2023 Yes    Iron deficiency anemia [D50.9] 01/29/2023 Yes    Type 2 diabetes mellitus with stage 5 chronic kidney disease not on chronic dialysis, without long-term current use of insulin [E11.22, N18.5] 09/07/2022 Yes     Chronic    Generalized anxiety disorder [F41.1] 11/08/2019 Yes     Chronic    Chronic diastolic heart failure [I50.32] 02/07/2019 Yes     Chronic    Mixed hyperlipidemia [E78.2] 04/09/2018 Yes    Postoperative hypothyroidism [E89.0] 04/09/2018 Yes     Chronic    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    Specialties: Internal Medicine, Hospitalist  Contact information:  [address] [facility] [address] [phone]                            Patient Instructions:      Ambulatory referral/consult to Outpatient Case Management   Referral Priority: Routine Referral Type: Consultation   Referral Reason: Specialty Services Required   Number of Visits Requested: 1     Diet renal     Notify your health care provider if you experience any of the following:  severe uncontrolled pain     Notify your health care provider if you experience any of the following:  persistent nausea and vomiting or diarrhea     Notify your health care provider if you experience any of the following:  temperature >100.4     Notify your health care provider if you experience any of the following:  persistent dizziness, light-headedness, or visual disturbances     Notify your health care provider if you experience any of the following:  increased confusion or weakness     Notify your health care provider if you experience any of the following:  difficulty breathing or increased cough     Activity as tolerated       Significant Diagnostic Studies: N/A    Pending Diagnostic Studies:       None           Medications:  Reconciled Home Medications:      Medication List        START taking these medications      diphenhydrAMINE 50 MG capsule  Commonly known as: BANOPHEN  Take 1 capsule by mouth 1 hour prior to test     predniSONE 50 MG Tab  Commonly known as: DELTASONE  Take 1 tablet by mouth 13 hour prior to test, 1 tablet 7 hour prior to test, and 1 tablet at 1 hour prior to test            CHANGE how you take these medications      EScitalopram oxalate 10 MG tablet  Commonly known as: LEXAPRO  Take 1 tablet (10 mg total) by mouth once daily.  What changed: when to take this            CONTINUE taking these medications      calcium acetate(phosphat bind) 667 mg capsule  Commonly known as: PHOSLO  Take 2 capsules by mouth 3 (three) times daily     cholecalciferol (vitamin D3) 125 mcg (5,000 unit) Tab  Take 1 tablet (5,000 Units total) by mouth once daily.     ferrous  gluconate 324 MG tablet  Commonly known as: FERGON  Take 1 tablet (324 mg total) by mouth daily with breakfast.     hydrALAZINE 50 MG tablet  Commonly known as: APRESOLINE  Take 1 tablet (50 mg total) by mouth every 12 (twelve) hours.     irbesartan 300 MG tablet  Commonly known as: AVAPRO  Take 1 tablet by mouth daily     levocetirizine 5 MG tablet  Commonly known as: XYZAL  Take 1 tablet (5 mg total) by mouth daily as needed for Allergies.     NIFEdipine 60 MG Tbsr  Commonly known as: ADALAT CC  Take 1 tablet by mouth daily     OPW TEST CLAIM - DO NOT FILL  OPW test claim. Do not fill.     SYNTHROID 200 MCG tablet  Generic drug: levothyroxine  Take 1 tablet (200 mcg total) by mouth before breakfast.     traZODone 50 MG tablet  Commonly known as: DESYREL  Take 1 to 2 tablets ( mg total) by mouth nightly as needed for Insomnia.              Indwelling Lines/Drains at time of discharge:   Lines/Drains/Airways       Central Venous Catheter Line  Duration                  Hemodialysis Catheter 08/17/23 1150 right subclavian 104 days         Hemodialysis Catheter 11/28/23 1627 right internal jugular <1 day              Drain  Duration                  Hemodialysis AV Fistula 12/16/22 0649 Left upper arm 348 days                    Time spent on the discharge of patient: 33 minutes         Stepan Nuñez PA-C  Department of Hospital Medicine  [facility] - Observation 11H

## 2023-11-29 NOTE — NURSING
Pt arrived back onto to unit frim IR. Pt AAOx4. Respirations even and unlabored. VSS. Patient requesting to eat at this time. PA notified for diet placement. Safety and fall precautions maintained

## 2023-11-29 NOTE — PLAN OF CARE
3 hour treatment complete. Patient tolerated well. 2200 ml removed. Patient rinsed back. Catheter flushed with NS and Heparin 1000 units/ml instilled. Catheter clamped, capped taped and wrapped in gauze. MWF chronic dialysis patient. No complaints at this time.

## 2023-11-29 NOTE — TELEPHONE ENCOUNTER
Spoke with Joy nurse from [facility] who was f/u on pt from last appt.Informed her that the pt is scheduled for a fistulogram on 11/30/23.Joy verbalized understanding of information received.  ----- Message from Karine Willis sent at 11/29/2023 12:02 PM CST -----  Contact: [phone]  Meghann Hamilton is calling to speak with someone in provider office regarding the order for the patient she is asking for a return call please call her at [phone]

## 2023-11-29 NOTE — PLAN OF CARE
Problem: Adult Inpatient Plan of Care  Goal: Plan of Care Review  Outcome: Ongoing, Progressing  Goal: Patient-Specific Goal (Individualized)  Outcome: Ongoing, Progressing  Goal: Absence of Hospital-Acquired Illness or Injury  Outcome: Ongoing, Progressing  Goal: Optimal Comfort and Wellbeing  Outcome: Ongoing, Progressing  Goal: Readiness for Transition of Care  Outcome: Ongoing, Progressing     Problem: Infection  Goal: Absence of Infection Signs and Symptoms  Outcome: Ongoing, Progressing     Problem: Diabetes Comorbidity  Goal: Blood Glucose Level Within Targeted Range  Outcome: Ongoing, Progressing     Problem: Fall Injury Risk  Goal: Absence of Fall and Fall-Related Injury  Outcome: Ongoing, Progressing     Problem: Device-Related Complication Risk (Hemodialysis)  Goal: Safe, Effective Therapy Delivery  Outcome: Ongoing, Progressing     Problem: Hemodynamic Instability (Hemodialysis)  Goal: Effective Tissue Perfusion  Outcome: Ongoing, Progressing     Problem: Infection (Hemodialysis)  Goal: Absence of Infection Signs and Symptoms  Outcome: Ongoing, Progressing

## 2023-11-29 NOTE — PROGRESS NOTES
[facility] STAFF HEMODIALYSIS NOTE     Patient currently on hemodialysis for removal of uremic toxins and volume.     Patient seen and evaluated on hemodialysis, tolerating treatment, see HD flowsheet for vitals and assessments.    Labs have been reviewed and the dialysate bath has been adjusted.       Assessment/Plan:    -Tunneled HD line placed 11/28  -Patient seen on HD, tolerating treatment well, w/o complaints   -UF goal of 2L  -Renal diet, if not NPO   -Strict I/O's and daily weights  -Daily renal function panels  -Keep MAP >65 while on HD   -Hgb goal 10-11  -continue home phos binder   -Will continue to follow while inpatient     Alka Mann DNP-FNP, C  Nephrology  Pager: [phone]

## 2023-11-30 ENCOUNTER — HOSPITAL ENCOUNTER (OUTPATIENT)
Facility: HOSPITAL | Age: 63
Discharge: HOME | End: 2023-11-30
Attending: SURGERY | Admitting: SURGERY
Payer: MEDICARE

## 2023-11-30 VITALS
HEART RATE: 92 BPM | TEMPERATURE: 98 F | RESPIRATION RATE: 18 BRPM | SYSTOLIC BLOOD PRESSURE: 140 MMHG | OXYGEN SATURATION: 100 % | WEIGHT: 234 LBS | DIASTOLIC BLOOD PRESSURE: 78 MMHG | BODY MASS INDEX: 33.58 KG/M2

## 2023-11-30 DIAGNOSIS — N18.6 ESRD (END STAGE RENAL DISEASE): ICD-10-CM

## 2023-11-30 DIAGNOSIS — T82.858D ARTERIOVENOUS FISTULA STENOSIS, SUBSEQUENT ENCOUNTER: ICD-10-CM

## 2023-11-30 LAB
POCT GLUCOSE: 131 MG/DL (ref 70–110)
POCT GLUCOSE: 146 MG/DL (ref 70–110)

## 2023-11-30 PROCEDURE — C1894 INTRO/SHEATH, NON-LASER: HCPCS | Mod: HCNC,TXP | Performed by: SURGERY

## 2023-11-30 PROCEDURE — 99152 PR MOD CONSCIOUS SEDATION, SAME PHYS, 5+ YRS, FIRST 15 MIN: ICD-10-PCS | Mod: NTX,,, | Performed by: SURGERY

## 2023-11-30 PROCEDURE — 25000003 PHARM REV CODE 250: Mod: HCNC,NTX | Performed by: SURGERY

## 2023-11-30 PROCEDURE — 25000003 PHARM REV CODE 250: Mod: HCNC,TXP | Performed by: SURGERY

## 2023-11-30 PROCEDURE — 99152 MOD SED SAME PHYS/QHP 5/>YRS: CPT | Mod: HCNC,NTX | Performed by: SURGERY

## 2023-11-30 PROCEDURE — 63600175 PHARM REV CODE 636 W HCPCS: Mod: HCNC,NTX | Performed by: SURGERY

## 2023-11-30 PROCEDURE — 36902 INTRO CATH DIALYSIS CIRCUIT: CPT | Mod: NTX,,, | Performed by: SURGERY

## 2023-11-30 PROCEDURE — C1769 GUIDE WIRE: HCPCS | Mod: HCNC,NTX | Performed by: SURGERY

## 2023-11-30 PROCEDURE — C1887 CATHETER, GUIDING: HCPCS | Mod: HCNC,TXP | Performed by: SURGERY

## 2023-11-30 PROCEDURE — 36902 PR INTRO CATH, DIALYSIS CIRCUIT W/TRANSLML BALLOON ANGIO: ICD-10-PCS | Mod: NTX,,, | Performed by: SURGERY

## 2023-11-30 PROCEDURE — 27201423 OPTIME MED/SURG SUP & DEVICES STERILE SUPPLY: Mod: HCNC,TXP | Performed by: SURGERY

## 2023-11-30 PROCEDURE — 36902 INTRO CATH DIALYSIS CIRCUIT: CPT | Mod: HCNC,TXP | Performed by: SURGERY

## 2023-11-30 PROCEDURE — C1725 CATH, TRANSLUMIN NON-LASER: HCPCS | Mod: HCNC,NTX | Performed by: SURGERY

## 2023-11-30 PROCEDURE — 63600175 PHARM REV CODE 636 W HCPCS: Mod: HCNC,TXP | Performed by: SURGERY

## 2023-11-30 PROCEDURE — 99152 MOD SED SAME PHYS/QHP 5/>YRS: CPT | Mod: NTX,,, | Performed by: SURGERY

## 2023-11-30 PROCEDURE — 25500020 PHARM REV CODE 255: Mod: HCNC,TXP | Performed by: SURGERY

## 2023-11-30 RX ORDER — HEPARIN SOD,PORCINE/0.9 % NACL 1000/500ML
INTRAVENOUS SOLUTION INTRAVENOUS
Status: DISCONTINUED | OUTPATIENT
Start: 2023-11-30 | End: 2023-11-30 | Stop reason: HOSPADM

## 2023-11-30 RX ORDER — HEPARIN SODIUM 1000 [USP'U]/ML
INJECTION, SOLUTION INTRAVENOUS; SUBCUTANEOUS
Status: DISCONTINUED | OUTPATIENT
Start: 2023-11-30 | End: 2023-11-30 | Stop reason: HOSPADM

## 2023-11-30 RX ORDER — SODIUM CHLORIDE 9 MG/ML
INJECTION, SOLUTION INTRAVENOUS CONTINUOUS
Status: DISCONTINUED | OUTPATIENT
Start: 2023-11-30 | End: 2023-11-30 | Stop reason: HOSPADM

## 2023-11-30 RX ORDER — MIDAZOLAM HYDROCHLORIDE 1 MG/ML
INJECTION, SOLUTION INTRAMUSCULAR; INTRAVENOUS
Status: DISCONTINUED | OUTPATIENT
Start: 2023-11-30 | End: 2023-11-30 | Stop reason: HOSPADM

## 2023-11-30 RX ORDER — LIDOCAINE HYDROCHLORIDE 20 MG/ML
INJECTION, SOLUTION EPIDURAL; INFILTRATION; INTRACAUDAL; PERINEURAL
Status: DISCONTINUED | OUTPATIENT
Start: 2023-11-30 | End: 2023-11-30 | Stop reason: HOSPADM

## 2023-11-30 RX ORDER — ACETAMINOPHEN 500 MG
1000 TABLET ORAL EVERY 6 HOURS PRN
Status: DISCONTINUED | OUTPATIENT
Start: 2023-11-30 | End: 2023-11-30 | Stop reason: HOSPADM

## 2023-11-30 RX ORDER — FENTANYL CITRATE 50 UG/ML
INJECTION, SOLUTION INTRAMUSCULAR; INTRAVENOUS
Status: DISCONTINUED | OUTPATIENT
Start: 2023-11-30 | End: 2023-11-30 | Stop reason: HOSPADM

## 2023-11-30 NOTE — PROGRESS NOTES
Pt brought to chair 7A post fistula millicent Nuñez RN brought pt and report was received by this rn. Pt's vs taken and wnl and pt free from s/s of distress. Pt requesting tylenol for left wrist pain. Pt AAOx4. Safety measures in place and pt understands to call if s/s of bleeding or distress occur as well as not to push or pull with left wrist. Dr. Quesada notified of pt's pain and MD put in tylenol to be given. MD ordered to begin removing air 2 hours post hemostasis of the TR band due to the amount of heparin pt received. Pt's TR band is intact and there are no s/s of bleeding at site. .

## 2023-11-30 NOTE — Clinical Note
The left radial was prepped. The site was prepped with ChloraPrep. The patient was draped. LEFT ARM/ LEFT AV FISTULA

## 2023-11-30 NOTE — BRIEF OP NOTE
Brief Operative Note  Date: 11/30/2023    Surgeon:  Tirso Tavera MD [facility]    Pre-op Diagnosis:  Threatended AVF; evidence of stenosis  T82.858A: Stenosis of vascular prosthetic devices, implants and grafts, initial encounter    Post-op Diagnosis:  Same    Procedure(s):  1) U/S-guided L transradial access; BVT AVF fistulagram  2) PTA proximal stenoses x2 and mid-AVF stenosis with 7x60mm Mehran balloon  3) Conscious sedation    Anesthesia: Local MAC    Findings/Key Components:  Successful treatment of proximal stenosis  Good palpable thrill    EBL: < 10 ml    Anesthesia: IV regional    Findings: Resolution of stenosis, >75%; strong palpable thrill; obese upper arm; note given to begin to use           Complications:  None; patient tolerated the procedure well.           Disposition: Recoery- hemodynamically stable in good condition    Attending Attestation: I was present and scrubbed for the entire procedure.      Specimens (From admission, onward)      None          I attest to being present for the procedure and performing the case.  Tirso Tavera MD [facility] FACS  Discharge Note  SUMMARY    Admit Date: 11/30/2023    Attending Physician: Tirso Tavera MD [facility]    Discharge Physician: Tirso Tavera MD [facility]    Discharge Date: 11/30/2023    Final Diagnosis: Arteriovenous fistula stenosis, subsequent encounter [T82.858D]    Outcome of Treatment: Successful PTA    Disposition: Home or self-care    Patient Instructions:   Current Discharge Medication List        CONTINUE these medications which have NOT CHANGED    Details   calcium acetate,phosphat bind, (PHOSLO) 667 mg capsule Take 2 capsules by mouth 3 (three) times daily  Qty: 180 capsule, Refills: 11    Comments: Medically Necessary Override      cholecalciferol, vitamin D3, 125 mcg (5,000 unit) Tab Take 1 tablet (5,000 Units total) by mouth once daily.  Qty: 90 tablet, Refills: 3    Associated Diagnoses: Vitamin D deficiency      diphenhydrAMINE (BANOPHEN) 50  MG capsule Take 1 capsule by mouth 1 hour prior to test  Qty: 1 capsule, Refills: 0      EScitalopram oxalate (LEXAPRO) 10 MG tablet Take 1 tablet (10 mg total) by mouth once daily.  Qty: 90 tablet, Refills: 3    Associated Diagnoses: Generalized anxiety disorder      ferrous gluconate (FERGON) 324 MG tablet Take 1 tablet (324 mg total) by mouth daily with breakfast.  Qty: 90 tablet, Refills: 1    Associated Diagnoses: Iron deficiency anemia, unspecified iron deficiency anemia type      hydrALAZINE (APRESOLINE) 50 MG tablet Take 1 tablet (50 mg total) by mouth every 12 (twelve) hours.  Qty: 180 tablet, Refills: 1    Comments: .  Associated Diagnoses: Chronic diastolic heart failure; Renovascular hypertension      irbesartan (AVAPRO) 300 MG tablet Take 1 tablet by mouth daily  Qty: 30 tablet, Refills: 11    Comments: Medically Necessary Override      levocetirizine (XYZAL) 5 MG tablet Take 1 tablet (5 mg total) by mouth daily as needed for Allergies.  Qty: 90 tablet, Refills: 3    Associated Diagnoses: Chronic allergic rhinitis      NIFEdipine (ADALAT CC) 60 MG TbSR Take 1 tablet by mouth daily  Qty: 30 tablet, Refills: 11    Comments: Medically Necessary Override      predniSONE (DELTASONE) 50 MG Tab Take 1 tablet by mouth 13 hour prior to test, 1 tablet 7 hour prior to test, and 1 tablet at 1 hour prior to test  Qty: 3 tablet, Refills: 0      SYNTHROID 200 mcg tablet Take 1 tablet (200 mcg total) by mouth before breakfast.  Qty: 90 tablet, Refills: 3    Associated Diagnoses: Postoperative hypothyroidism      traZODone (DESYREL) 50 MG tablet Take 1 to 2 tablets ( mg total) by mouth nightly as needed for Insomnia.  Qty: 120 tablet, Refills: 3    Associated Diagnoses: Generalized anxiety disorder      OPW TEST CLAIM - DO NOT FILL OPW test claim. Do not fill.  Qty: 1 tablet, Refills: 0             Diet:  Resume pre-operative diet    Activity:  Ad oli    Follow-up:  Follow-up in clinic with Dr Tavera within 6  weeks; please call clinic nurse at

## 2023-11-30 NOTE — INTERVAL H&P NOTE
The patient has been examined and the H&P has been reviewed:    Since the last note: Pt now has a right dialysis catheter in place, getting dialysis through that. Last time fistula was used was a month ago due to clots have not tried since. She is not taking ASA or any blood thinners. On physical the proximal anastomosis with thrill that fades to the distal anastomosis. L Radial pulse +1. US on 11/2 showing mainly low flow (453 cc/min), depths 13-23 mm. Plan stays the same, transradial fistulogram with plan for PTA with 6-7mm diameter baloon of the mid-distal lesions.    Anesthesia/Surgery risks, benefits and alternative options discussed and understood by patient/family.      Josep Quesada MD  General Surgery PGY-1

## 2023-11-30 NOTE — OP NOTE
Date: 11/30/2023    Surgeon:  Tirso Tavera MD DFSVS FACS    Pre-op Diagnosis:  Threatended AVF; evidence of stenosis  T82.858A: Stenosis of vascular prosthetic devices, implants and grafts, initial encounter    Post-op Diagnosis:  Same    Procedure(s):  1) U/S-guided L transradial access; BVT AVF fistulagram  2) PTA proximal stenoses x2 and mid-AVF stenosis with 7x60mm Mehran balloon  3) Conscious sedation    Anesthesia: Local MAC    Findings/Key Components:  Successful treatment of proximal stenosis  Good palpable thrill    EBL: < 10 ml    Anesthesia: IV regional    Findings: Resolution of stenosis, >75%; strong palpable thrill; obese upper arm; note given to begin to use           Complications:  None; patient tolerated the procedure well.           Disposition: Recoery- hemodynamically stable in good condition    Attending Attestation: I was present and scrubbed for the entire procedure.    Procedure detail: The patient was brought to the interventional suite, placed in supine. Arm was prepped and draped in the standard surgical fashion. Under ultrasound guidance, the radial artery was accessed with a micropuncture needle; ultrasound confirmed vessel patency, followed by placement of 4/3-[place] Islander micropuncture dilator. Through this, an 0.018-inch wire was placed in a 4/5-[place] Islander transradial sheath. Through this, an 0.018-inch Glidewire was placed through the high-grade stenosis - >75%, which was demonstrated by the angiogram.  The ultrasound demonstrated vessel patency. A high-grade stenosis in proximal L BVT was found, which was crossed with a hyrophilic 0.018 in glidewire. This was treated first with PTA proximal stenoses x2 and mid-AVF stenosis with 7x60mm Mehran balloon high-pressure, noncompliant balloon. Resolution of the stenosis was noted. Strong thrill could be felt. The sheath was removed and a trans-radial artery band placed with good hemostasis; hand was well-perfused and thrill  palpable.  *MODERATE SEDATION IN CATH LAB   Tirso Tavera MD FACS was present for moderate sedation  Dr. Tavera monitored the patients cardio respiratory functions during the moderate sedation   See nurses notes for Intra-service start and end times and for the log of the name of the RN who administered the medicines for the moderate sedation, including their doseage and route.    Time of sedation:  45mins.

## 2023-11-30 NOTE — DISCHARGE INSTRUCTIONS
Discharge Instructions and Care of Your Wrist After a Cardiac Catheterization Procedure Performed via the Radial Artery    For 24 Hours following the procedure:  Do not subject your affected hand/arm to any forceful movements (i.e. supporting weight when rising from a chair or bed)  Do not drive a car for 24 hours  The dressing (band-aid) on the puncture site may be removed after 24 hours and left open to air.  If there is minor oozing, you may apply another Band-aid and remove after 12 hours  You may shower on the day following your procedure.  Do not take a tub bath or submerge the puncture site in water for 3 days following the procedure    For 72 hours following the procedure:   Do not lift anything heavier than 3 to 5 pounds with the affected hand/arm  Do not operate a lawnmower, motorcycle, chainsaw, or all-terrain vehicle   Avoid excessive (extension/flexion) wrist movement (i.e. supporting weight when rising from a chair or bed, push-ups, lifting garage doors, etc.)  Do not engage in vigorous exercise (i.e. Tennis, Golf, Bowling) using the affected arm    If bleeding should occur following discharge:  Sit down and apply firm pressure to the puncture site with your fingers for 10 minutes   If the bleeding stops, continue to sit quietly, keeping your wrist straight for 2 hours. Notify your physician as soon as possible   If bleeding does not stop after 10 minutes or if there is a large amount of bleeding or spurting, call 911 immediately.  Do not drive yourself to the hospital.     You should expect mild tingling in your hand and tenderness at the puncture site for up to 3 days.     Notify your physician if these symptoms persist or if you experience:  Change in color or temperature of the hand or arm  Redness, heat, or pus at the puncture site  Chills or fever greater than 101.0 F

## 2023-12-01 ENCOUNTER — PATIENT OUTREACH (OUTPATIENT)
Dept: ADMINISTRATIVE | Facility: CLINIC | Age: 63
End: 2023-12-01
Payer: MEDICARE

## 2023-12-01 NOTE — PROGRESS NOTES
C3 nurse attempted to contact Kathie Quezada for a TCC post hospital discharge follow up call. The patient is unable to conduct the call @ this time. The patient requested a callback.    The patient has a scheduled \[facility]\"" appointment with Ji Elmore MD  on 12/05/23 @ [id number].

## 2023-12-01 NOTE — PROGRESS NOTES
Pt's TR band removed without issues. Gauze and tegaderm applied to left wrist site. Site remained free from s/s of bleeding. Pt walked, drank, ate, and used restroom without issues. Pt given discharge paperwork and education reiterated. All questions and concerns addressed. No iv or tele to be removed. Bruit and thrill present left upper arm.  Pt wheeled out to son's car.

## 2023-12-01 NOTE — PROGRESS NOTES
C3 nurse spoke with Kathie Quezada for a TCC post hospital discharge follow up call. [facility]  on 12/05/23 @ [id number].

## 2023-12-06 ENCOUNTER — PATIENT MESSAGE (OUTPATIENT)
Dept: NEPHROLOGY | Facility: CLINIC | Age: 63
End: 2023-12-06
Payer: MEDICARE

## 2023-12-07 ENCOUNTER — TELEPHONE (OUTPATIENT)
Dept: NEPHROLOGY | Facility: CLINIC | Age: 63
End: 2023-12-07
Payer: MEDICARE

## 2023-12-07 NOTE — TELEPHONE ENCOUNTER
SW contacted pt via phone and informed that her current dialysis clinic will need to facilitate her transfer to an [facility]. Pt informed her preferred [facility] location is [facility]. SW provided contact number of [facility] to provide to her current clinic.

## 2023-12-08 ENCOUNTER — PATIENT MESSAGE (OUTPATIENT)
Dept: TRANSPLANT | Facility: CLINIC | Age: 63
End: 2023-12-08
Payer: MEDICARE

## 2023-12-08 ENCOUNTER — TELEPHONE (OUTPATIENT)
Dept: TRANSPLANT | Facility: CLINIC | Age: 63
End: 2023-12-08
Payer: MEDICARE

## 2023-12-08 DIAGNOSIS — T82.898D ARTERIOVENOUS FISTULA OCCLUSION, SUBSEQUENT ENCOUNTER: Primary | ICD-10-CM

## 2023-12-08 NOTE — TELEPHONE ENCOUNTER
Spoke to patient, states her children were supposed to call to be evaluated as a living donor, I provided the website for the questionnaire. Patient also states she is trying to get transferred to [facility] for her dialysis treatments. Will keep coordinator informed.

## 2023-12-08 NOTE — TELEPHONE ENCOUNTER
----- Message from Jacqui Kaminski sent at 12/8/2023  3:13 PM CST -----  Regarding: Pt Status  Contact: Kathie  Regarding:  Pt Status        Name Of Caller: Kathie        Contact Preference: [phone] (home)          Nature of call:  Pt is calling to check on  Pt Status. Please advise. Requesting a savanah back.    Pt also states that she has donors that has not been contacted. I asked her to asked them to contact there coordinators.

## 2023-12-11 DIAGNOSIS — D50.9 IRON DEFICIENCY ANEMIA, UNSPECIFIED IRON DEFICIENCY ANEMIA TYPE: ICD-10-CM

## 2023-12-12 RX ORDER — FERROUS GLUCONATE 324(38)MG
324 TABLET ORAL
Qty: 90 TABLET | Refills: 1 | Status: SHIPPED | OUTPATIENT
Start: 2023-12-12 | End: 2024-06-20

## 2023-12-20 ENCOUNTER — OUTPATIENT CASE MANAGEMENT (OUTPATIENT)
Dept: ADMINISTRATIVE | Facility: OTHER | Age: 63
End: 2023-12-20
Payer: MEDICARE

## 2023-12-20 NOTE — PROGRESS NOTES
Attempt #:  1  This LMSW attempted to reach patient/caregiver to provide resource, however not available. Patient request. SW contact her later today.

## 2023-12-21 NOTE — PROGRESS NOTES
INTERNAL MEDICINE CLINIC  Follow-up Visit Progress Note     PRESENTING HISTORY     PCP: Elan Price MD    Last Clinic Visit with me:  11-19-21    Current Chief Complaint/Problem: Follow up CHF, DM    History of Present Illness & ROS: Ms. Kathie Quezada is a 61 y.o. female.    Doing well.  No chest pain or SOB.    No leg swelling.    Flank pain has improved.    No fever or chill.    No other complaints.    PAST HISTORY:     Past Medical History:   Diagnosis Date    Acute respiratory failure with hypoxia and hypercapnia 5/26/2019    ATN (acute tubular necrosis) 2/8/2019    Chronic diastolic heart failure 2/7/2019 2-8-19 Mild left atrial enlargement. Mild left ventricular enlargement. Normal left ventricular systolic function. The estimated ejection fraction is 55% Indeterminate left ventricular diastolic function. Normal right ventricular systolic function. Mild mitral regurgitation. Mild to moderate tricuspid regurgitation. The estimated PA systolic pressure is 32 mm Hg Normal central venous pressure (3 m    CKD stage G3a/A3, GFR 45-59 and albumin creatinine ratio >300 mg/g 5/16/2018    CKD stage G3b/A3, GFR 30-44 and albumin creatinine ratio >300 mg/g 5/16/2018    Closed compression fracture of L2 lumbar vertebra 5/24/2019    Closed compression fracture of L2 lumbar vertebra 5/24/2019     IMO Regulatory Load October 2019    Controlled type 2 diabetes with retinopathy 4/9/2018    COVID-19 virus infection 7-1-2021 7/1/2021    CVA (cerebral vascular accident) 2017    Diabetic polyneuropathy associated with type 2 diabetes mellitus 12/4/2020    Essential hypertension 4/9/2018    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  7-31-19 Factor XI Activity 55 - 145 % 96       Gastroesophageal reflux disease without esophagitis 5/24/2019    Generalized anxiety disorder 11/8/2019    GERD (gastroesophageal reflux disease)     GIB (gastrointestinal bleeding) 10/18/2018    History of  Prescription sent to [facility]-please see if [facility] can send prescription to [facility] please for compounding.   hepatitis C     s/p succesful Rx w/ Harvoni  - [id number] (cure) 2016    Hypertensive retinopathy, bilateral 12/10/2019    Mild nonproliferative diabetic retinopathy 05/02/2018    Mixed hyperlipidemia 4/9/2018    Normocytic anemia 1/14/2022    NS (nuclear sclerosis), bilateral 12/10/2019    PNA (pneumonia) 5/28/2019    Postoperative hypothyroidism     Renovascular hypertension 4/9/2018    S/P kyphoplasty 8/14/2019 8/14/19: L2 kyphoplasty with Dr. Kwon     Slow transit constipation 5/27/2019    Type 2 diabetes mellitus with circulatory disorder, with long-term current use of insulin 4/9/2018    Type 2 diabetes mellitus with stage 3 chronic kidney disease, without long-term current use of insulin 4/9/2018    Type 2 diabetes mellitus with stage 3a chronic kidney disease, without long-term current use of insulin 3/3/2021       Past Surgical History:   Procedure Laterality Date    BACK SURGERY  2017    CHOLECYSTECTOMY  2015    [place]    COLONOSCOPY  11/13/2015    Dr Chino torres prep. internal hemorrhoids. diverticulosis of sigmois. NO polyps. repeat due 2025    FIXATION KYPHOPLASTY Bilateral 8/14/2019    Procedure: L2 kyphoplasty Fluoro Globus;  Surgeon: Jeremie Kwon DO;  Location: [facility];  Service: Neurosurgery;  Laterality: Bilateral;  [facility]CR[facility]OW [phone] TEXTED HER @ 10:54AM ON 7-30-19  PRE-OP-BY RN  8-7-2019    HYSTERECTOMY      OOPHORECTOMY      one    THYROIDECT[facility]Y  2016    at [facility] nodule    TONSILLECTOMY         Family History   Problem Relation Age of Onset    Diabetes type II Mother        Social History     Socioeconomic History    Marital status:    Tobacco Use    Smoking status: Never Smoker    Smokeless tobacco: Never Used   Substance and Sexual Activity    Alcohol use: Yes     Alcohol/week: 4.0 standard drinks     Types: 4 Cans of beer per week     Comment: weekly    Drug use: No   Social History Narrative    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 [employer]      Social Determinants of Health     Financial Resource Strain: Low Risk     Difficulty of Paying Living Expenses: Not very hard   Food Insecurity: Food Insecurity Present    Worried About Running Out of Food in the Last Year: Sometimes true    Ran Out of Food in the Last Year: Sometimes true   Transportation Needs: Unmet Transportation Needs    Lack of Transportation (Medical): Yes    Lack of Transportation (Non-Medical): Yes   Physical Activity: Insufficiently Active    Days of Exercise per Week: 3 days    Minutes of Exercise per Session: 30 min   Stress: No Stress Concern Present    Feeling of Stress : Only a little   Social Connections: Unknown    Frequency of Communication with Friends and Family: More than three times a week    Frequency of Social Gatherings with Friends and Family: Three times a week    Active Member of Clubs or Organizations: Yes    Attends Club or Organization Meetings: More than 4 times per year    Marital Status:    Housing Stability: Low Risk     Unable to Pay for Housing in the Last Year: No    Number of Places Lived in the Last Year: 1    Unstable Housing in the Last Year: No       MEDICATIONS & ALLERGIES:     Current Outpatient Medications on File Prior to Visit   Medication Sig Dispense Refill    amLODIPine (NORVASC) 10 MG tablet Take 1 tablet (10 mg total) by mouth once daily. 90 tablet 3    atorvastatin (LIPITOR) 10 MG tablet Take 1 tablet (10 mg total) by mouth once daily. 90 tablet 3    carvediloL (COREG) 25 MG tablet Take 1 tablet (25 mg total) by mouth 2 (two) times daily with meals. 180 tablet 3    cholecalciferol, vitamin D3, 125 mcg (5,000 unit) Tab Take 1 tablet (5,000 Units total) by mouth once daily.      EScitalopram oxalate (LEXAPRO) 10 MG tablet Take 1 tablet (10 mg total) by mouth once daily. 90 tablet 3    furosemide (LASIX) 20 MG tablet Take 1 tablet (20 mg total) by mouth once daily. 90  tablet 3    peg 400/hypromellose/glycerin (VISINE DRY EYE RELIEF OPHT) Apply 1 drop to eye 2 (two) times daily as needed (BOTH EYES).      SYNTHROID 200 mcg tablet Take 1 tablet (200 mcg total) by mouth before breakfast. 90 tablet 3    traZODone (DESYREL) 50 MG tablet Take 1 tablet (50 mg total) by mouth nightly as needed for Insomnia. 90 tablet 3     Review of patient's allergies indicates:   Allergen Reactions    Mushroom Hives    Bactrim [sulfamethoxazole-trimethoprim] Hives    Codeine Hives    Gabapentin      Pruritis     Iodine     Peanut butter flavor     Penicillins Hives    Shellfish containing products Itching    Sulfa (sulfonamide antibiotics) Hives       Medications Reconciliation:   I have reconciled the patient's home medications and discharge medications with the patient/family. I have updated all changes.  Refer to After-Visit Medication List.    OBJECTIVE:     Vital Signs:  Vitals:    01/14/22 1009   BP: 120/68   Pulse: 76     Wt Readings from Last 3 Encounters:   01/14/22 1009 114.8 kg (253 lb 1.4 oz)   11/19/21 0936 109.6 kg (241 lb 10 oz)   07/23/21 1014 111.2 kg (245 lb 2.4 oz)     Body mass index is 36.31 kg/m².     Physical Exam:  General: Well developed, well nourished. No distress.  HEENT: Head is normocephalic, atraumatic  Eyes: Clear conjunctiva.  Neck: Supple, symmetrical neck; trachea midline.  Lungs: Clear to auscultation bilaterally and normal respiratory effort.  Cardiovascular: Heart with regular rate and rhythm.    Extremities: No LE edema. Pulses 2+ and symmetric.   Abdomen: Abdomen is soft, non-tender non-distended with normal bowel sounds.  Skin: Skin color, texture, turgor normal. No rashes.  Musculoskeletal: Normal gait.   Lymph Nodes: No cervical or supraclavicular adenopathy.  Neurologic: Normal strength and tone.   Psychiatric: Normal affect. Alert.\    Laboratory  Lab Results   Component Value Date    WBC 11.26 12/21/2021    HGB 10.2 (L) 12/21/2021    HCT 32.5  (L) 12/21/2021     12/21/2021    CHOL 196 12/21/2021    TRIG 147 12/21/2021    HDL 62 12/21/2021    ALT 11 12/21/2021    AST 15 12/21/2021     12/21/2021    K 4.3 12/21/2021     12/21/2021    CREATININE 3.0 (H) 12/21/2021    BUN 30 (H) 12/21/2021    CO2 26 12/21/2021    TSH 1.576 12/21/2021    INR 1.0 04/13/2021    HGBA1C 6.2 (H) 12/21/2021       ASSESSMENT & PLAN:     Chronic diastolic heart failure  CKD stage G3b/A3, GFR 30-44 and albumin creatinine ratio >300 mg/g  Nephrotic range proteinuria  Renovascular hypertension  - Compensated CHF.    Could not get DIgital HTN to work for her.       US 11-19-21  Preserved right and left cortical thickness, echogenicity, and corticomedullary differentiation.  No right or left renal masses, stones, or hydronephrosis.  Right renal resistive index 0.83.  Left renal resistive index 0.82.  Right kidney measures 10.7 cm.  Left kidney measures 10.9 cm.  Given level of bladder distention with urine, no intraluminal masses. Unremarkable renal ultrasound.       CHF/HTN Regimen:  -     amLODIPine (NORVASC) 10 MG tablet; Take 1 tablet (10 mg total) by mouth once daily.    -     carvediloL (COREG) 25 MG tablet; Take 1 tablet (25 mg total) by mouth 2 (two) times daily   -     furosemide (LASIX) 20 MG tablet; Take 1 tablet (20 mg total) by mouth once daily.       Type 2 diabetes mellitus with stage 3 chronic kidney disease, without long-term current use of insulin  Diabetic Neuropathy and retinopathy  - Diet controlled. A1c 6.2     Mixed hyperlipidemia  - LDL goal < 100    Last LDL 88 ()     - On Lipitor 10 mg daily.     Vitamin D deficiency  - Change from weekly to daily Vitamin D3 5000 IU daily OTC.   -     cholecalciferol, vitamin D3, 125 mcg (5,000 unit) Tab; Take 1 tablet (5,000 Units total) by mouth once daily.     Postoperative hypothyroidism  -     SYNTHROID 200 mcg tablet; Take 1 tablet (200 mcg total) by mouth before breakfast.      Normocytic  anemia  -   Improved. Normal B12 and iron.     Generalized anxiety disorder  -     EScitalopram oxalate (LEXAPRO) 10 MG tablet; Take 1 tablet (10 mg total) by mouth once daily.   -     traZODone (DESYREL) 50 MG tablet; Take 1 tablet (50 mg total) by mouth nightly as needed for Insomnia.        Factor XI deficiency  - Usually get  FFP 2 units right before surgery for Factor XI deficiency     Class 2 severe obesity due to excess calories with serious comorbidity and body mass index (BMI) of 35.0 to 35.9 in adult  Body mass index is 36.31 kg/m².       Preventive Health Maintenance:  Up to date.     Return to Clinic for Follow Up with me:  June 8.     Scheduled Follow-up :  Future Appointments   Date Time Provider Department Center   1/19/2022 10:45 AM Sherin Santizo OD [facility]   6/8/2022 10:30 AM Elan Price MD [facility]       After Visit Medication List :     Medication List          Accurate as of January 14, 2022 10:36 AM. If you have any questions, ask your nurse or doctor.            CONTINUE taking these medications    amLODIPine 10 MG tablet  Commonly known as: NORVASC  Take 1 tablet (10 mg total) by mouth once daily.     atorvastatin 10 MG tablet  Commonly known as: LIPITOR  Take 1 tablet (10 mg total) by mouth once daily.     carvediloL 25 MG tablet  Commonly known as: COREG  Take 1 tablet (25 mg total) by mouth 2 (two) times daily with meals.     cholecalciferol (vitamin D3) 125 mcg (5,000 unit) Tab  Take 1 tablet (5,000 Units total) by mouth once daily.     EScitalopram oxalate 10 MG tablet  Commonly known as: LEXAPRO  Take 1 tablet (10 mg total) by mouth once daily.     furosemide 20 MG tablet  Commonly known as: LASIX  Take 1 tablet (20 mg total) by mouth once daily.     SYNTHROID 200 MCG tablet  Generic drug: levothyroxine  Take 1 tablet (200 mcg total) by mouth before breakfast.     traZODone 50 MG tablet  Commonly known as: DESYREL  Take 1 tablet (50 mg total) by mouth  nightly as needed for Insomnia.     VISINE DRY EYE RELIEF OPHT        STOP taking these medications    blood sugar diagnostic Strp  Stopped by: Elan Price MD            Signing Physician:  Elan Price MD

## 2023-12-26 NOTE — PROGRESS NOTES
SW contacted patient regarding referral. SW explained to patient reason for referral to assist with social needs. Patient denied having any needs. SW will close case as no needs.

## 2023-12-28 ENCOUNTER — HOSPITAL ENCOUNTER (OUTPATIENT)
Dept: VASCULAR SURGERY | Facility: CLINIC | Age: 63
Discharge: HOME | End: 2023-12-28
Attending: SURGERY
Payer: MEDICARE

## 2023-12-28 DIAGNOSIS — T82.898D ARTERIOVENOUS FISTULA OCCLUSION, SUBSEQUENT ENCOUNTER: ICD-10-CM

## 2023-12-28 PROCEDURE — 93990 PR DUPLEX HEMODIALYSIS ACCESS: ICD-10-PCS | Mod: HCNC,S$GLB,TXP, | Performed by: SURGERY

## 2023-12-28 PROCEDURE — 93990 DOPPLER FLOW TESTING: CPT | Mod: HCNC,S$GLB,TXP, | Performed by: SURGERY

## 2024-01-10 DIAGNOSIS — Z76.82 ORGAN TRANSPLANT CANDIDATE: Primary | ICD-10-CM

## 2024-01-17 ENCOUNTER — OFFICE VISIT (OUTPATIENT)
Dept: CARDIOLOGY | Facility: CLINIC | Age: 64
End: 2024-01-17
Payer: MEDICARE

## 2024-01-17 DIAGNOSIS — Z76.82 PRE-KIDNEY TRANSPLANT, LISTED: Primary | ICD-10-CM

## 2024-01-17 DIAGNOSIS — E66.01 CLASS 2 SEVERE OBESITY DUE TO EXCESS CALORIES WITH SERIOUS COMORBIDITY AND BODY MASS INDEX (BMI) OF 35.0 TO 35.9 IN ADULT: ICD-10-CM

## 2024-01-17 DIAGNOSIS — Z86.73 HISTORY OF CVA (CEREBROVASCULAR ACCIDENT): ICD-10-CM

## 2024-01-17 DIAGNOSIS — I25.10 CORONARY ARTERY CALCIFICATION: ICD-10-CM

## 2024-01-17 DIAGNOSIS — I27.20 PULMONARY HYPERTENSION: ICD-10-CM

## 2024-01-17 DIAGNOSIS — Z99.2 ESRD ON DIALYSIS: ICD-10-CM

## 2024-01-17 DIAGNOSIS — I77.0 AVF (ARTERIOVENOUS FISTULA): ICD-10-CM

## 2024-01-17 DIAGNOSIS — I15.0 RENOVASCULAR HYPERTENSION: Chronic | ICD-10-CM

## 2024-01-17 DIAGNOSIS — I25.84 CORONARY ARTERY CALCIFICATION: ICD-10-CM

## 2024-01-17 DIAGNOSIS — Z99.2 TYPE 2 DIABETES MELLITUS WITH CHRONIC KIDNEY DISEASE ON CHRONIC DIALYSIS, WITHOUT LONG-TERM CURRENT USE OF INSULIN: ICD-10-CM

## 2024-01-17 DIAGNOSIS — N18.6 TYPE 2 DIABETES MELLITUS WITH CHRONIC KIDNEY DISEASE ON CHRONIC DIALYSIS, WITHOUT LONG-TERM CURRENT USE OF INSULIN: ICD-10-CM

## 2024-01-17 DIAGNOSIS — E11.22 TYPE 2 DIABETES MELLITUS WITH CHRONIC KIDNEY DISEASE ON CHRONIC DIALYSIS, WITHOUT LONG-TERM CURRENT USE OF INSULIN: ICD-10-CM

## 2024-01-17 DIAGNOSIS — E78.2 MIXED HYPERLIPIDEMIA: ICD-10-CM

## 2024-01-17 DIAGNOSIS — N18.6 ESRD ON DIALYSIS: ICD-10-CM

## 2024-01-17 PROCEDURE — 1159F MED LIST DOCD IN RCRD: CPT | Mod: CPTII,95,TXP, | Performed by: PHYSICIAN ASSISTANT

## 2024-01-17 PROCEDURE — 1160F RVW MEDS BY RX/DR IN RCRD: CPT | Mod: CPTII,95,TXP, | Performed by: PHYSICIAN ASSISTANT

## 2024-01-17 PROCEDURE — 99214 OFFICE O/P EST MOD 30 MIN: CPT | Mod: 95,TXP,, | Performed by: PHYSICIAN ASSISTANT

## 2024-01-17 NOTE — PROGRESS NOTES
General Cardiology Clinic Note  Reason for Visit: Pre-kidney transplant   Last Clinic Visit: 11/28/2022  General Cardiologist: Dr. Terry     The patient location is: [place]  The chief complaint leading to consultation is: Pre-kidney transplant    Visit type: audiovisual    Face to Face time with patient: 20  35 minutes of total time spent on the encounter, which includes face to face time and non-face to face time preparing to see the patient (eg, review of tests), Obtaining and/or reviewing separately obtained history, Documenting clinical information in the electronic or other health record, Independently interpreting results (not separately reported) and communicating results to the patient/family/caregiver, or Care coordination (not separately reported).         Each patient to whom he or she provides medical services by telemedicine is:  (1) informed of the relationship between the physician and patient and the respective role of any other health care provider with respect to management of the patient; and (2) notified that he or she may decline to receive medical services by telemedicine and may withdraw from such care at any time.    Notes:      HPI:   Kathie Quezada is a 63 y.o. female who presents for clearance for kidney transplant.     Problems:  Hypertension   Mild pulmonary HTN   Coronary calcification on chest CT  Hx of CVA ~2007  DM Type 2  ESRD on HD  Factor 11 deficiency     HPI  Patient presents for clearance for kidney transplant. She feels well. She denies symptoms of CAD, CHF, arrhythmia, hypotension, TIA. She can walk at least 3-4 blocks and up one flight of stairs. BP after dialysis is mildly elevated 130s-150s systolic.     ROS:      Review of Systems   Constitutional: Negative for diaphoresis, malaise/fatigue, weight gain and weight loss.   HENT:  Negative for nosebleeds.    Eyes:  Negative for vision loss in left eye, vision loss in right eye and visual disturbance.    Cardiovascular:  Negative for chest pain, claudication, dyspnea on exertion, irregular heartbeat, leg swelling, near-syncope, orthopnea, palpitations, paroxysmal nocturnal dyspnea and syncope.   Respiratory:  Negative for cough, shortness of breath, sleep disturbances due to breathing, snoring and wheezing.    Hematologic/Lymphatic: Negative for bleeding problem. Does not bruise/bleed easily.   Skin:  Negative for poor wound healing and rash.   Musculoskeletal:  Negative for muscle cramps and myalgias.   Gastrointestinal:  Negative for bloating, abdominal pain, diarrhea, heartburn, melena, nausea and vomiting.   Genitourinary:  Negative for hematuria and nocturia.   Neurological:  Negative for brief paralysis, dizziness, headaches, light-headedness, numbness and weakness.   Psychiatric/Behavioral:  Negative for depression.    Allergic/Immunologic: Negative for hives.       PMH:     Past Medical History:   Diagnosis Date    Acute respiratory failure with hypoxia and hypercapnia 05/26/2019    ATN (acute tubular necrosis) 02/08/2019    Chronic diastolic heart failure 02/07/2019    2-8-19 Mild left atrial enlargement. Mild left ventricular enlargement. Normal left ventricular systolic function. The estimated ejection fraction is 55% Indeterminate left ventricular diastolic function. Normal right ventricular systolic function. Mild mitral regurgitation. Mild to moderate tricuspid regurgitation. The estimated PA systolic pressure is 32 mm Hg Normal central venous pressure (3 m    CKD (chronic kidney disease) stage 4, GFR 15-29 ml/min 05/16/2018    CKD stage G3a/A3, GFR 45-59 and albumin creatinine ratio >300 mg/g 05/16/2018    CKD stage G3b/A3, GFR 30-44 and albumin creatinine ratio >300 mg/g 05/16/2018    Closed compression fracture of L2 lumbar vertebra 05/24/2019    Closed compression fracture of L2 lumbar vertebra 05/24/2019     IMO Regulatory Load October 2019    Controlled type 2 diabetes with retinopathy  04/09/2018    COVID-19 virus infection 7-1-2021 07/01/2021    CVA (cerebral vascular accident) 2017    Diabetic polyneuropathy associated with type 2 diabetes mellitus 12/04/2020    Encounter for blood transfusion     Essential hypertension 04/09/2018    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  7-31-19 Factor XI Activity 55 - 145 % 96       Gastroesophageal reflux disease without esophagitis 05/24/2019    Generalized anxiety disorder 11/08/2019    GERD (gastroesophageal reflux disease)     GIB (gastrointestinal bleeding) 10/18/2018    History of CVA (cerebrovascular accident) without residual deficits 11/27/2022    History of hepatitis C     s/p succesful Rx w/ Harvoni  - SVR12 (cure) 2016    Hypertensive retinopathy, bilateral 12/10/2019    Mild nonproliferative diabetic retinopathy 05/02/2018    Mixed hyperlipidemia 04/09/2018    Myalgia due to statin 09/07/2022    Normocytic anemia 01/14/2022    NS (nuclear sclerosis), bilateral 12/10/2019    Peanut allergy 02/04/2023    PNA (pneumonia) 05/28/2019    Postoperative hypothyroidism     Renovascular hypertension 04/09/2018    S/P kyphoplasty 08/14/2019 8/14/19: L2 kyphoplasty with Dr. Kwon     Slow transit constipation 05/27/2019    Type 2 diabetes mellitus with circulatory disorder, with long-term current use of insulin 04/09/2018    Type 2 diabetes mellitus with stage 3 chronic kidney disease, without long-term current use of insulin 04/09/2018    Type 2 diabetes mellitus with stage 3a chronic kidney disease, without long-term current use of insulin 03/03/2021    Type 2 diabetes mellitus with stage 3b chronic kidney disease, without long-term current use of insulin 09/07/2022     Past Surgical History:   Procedure Laterality Date    AV FISTULA PLACEMENT Left 10/27/2022    Procedure: CREATION, AV FISTULA;  Surgeon: Tirso Tavera MD;  Location: [facility] OR [address] [place] [address];  Service: Peripheral Vascular;  Laterality: Left;  Brachio basilic 1st stage      BACK SURGERY  2017    CHOLECYSTECTOMY  2015    [facility]    COLONOSCOPY  11/13/2015    Dr Chino torres prep. internal hemorrhoids. diverticulosis of sigmois. NO polyps. repeat due 2025    FISTULOGRAM N/A 12/16/2022    Procedure: Fistulogram;  Surgeon: Tirso Tavera MD;  Location: [facility] CATH LAB;  Service: Peripheral Vascular;  Laterality: N/A;    FISTULOGRAM, WITH PTA Left 3/23/2023    Procedure: FISTULOGRAM, WITH PTA;  Surgeon: Tirso Tavera MD;  Location: [facility] OR [facility] FLR;  Service: Peripheral Vascular;  Laterality: Left;  LUE TRANSRADIAL CO2 FISTULOGRAM    1.9 min  22.99 mGy  4.3032 Gy.cm  200cc CO2  9ml TransRadial Solution    FISTULOGRAM, WITH PTA Left 8/15/2023    Procedure: FISTULOGRAM, WITH PTA TRANSRADIAL;  Surgeon: Tirso Tavera MD;  Location: [facility] OR [facility] FLR;  Service: Peripheral Vascular;  Laterality: Left;  Fluoro: 5.1 min, mGy: 7.95, Gycm2: 1.2626, contrast: 12ml    FIXATION KYPHOPLASTY Bilateral 08/14/2019    Procedure: L2 kyphoplasty Fluoro Globus;  Surgeon: Jeremie Kwon DO;  Location: [facility] [address];  Service: Neurosurgery;  Laterality: Bilateral;  GLOBUS MACRINA AGOSTOQUINN [phone] TEXTED HER @ 10:54AM ON 7-30-19  PRE-OP-BY RN  8-7-2019    HYSTERECTOMY      OOPHORECTOMY  1996    one    PERCUTANEOUS TRANSLUMINAL ANGIOPLASTY OF ARTERIOVENOUS FISTULA Left 12/16/2022    Procedure: PTA, AV FISTULA;  Surgeon: Tirso Tavera MD;  Location: [facility] CATH LAB;  Service: Peripheral Vascular;  Laterality: Left;    PERCUTANEOUS TRANSLUMINAL ANGIOPLASTY OF ARTERIOVENOUS FISTULA Left 11/30/2023    Procedure: PTA, AV FISTULA;  Surgeon: Tirso Tavera MD;  Location: [facility] CATH LAB;  Service: Peripheral Vascular;  Laterality: Left;  left transradial fistulagram    RIGHT HEART CATHETERIZATION Right 6/15/2023    Procedure: INSERTION, CATHETER, RIGHT HEART;  Surgeon: Lucretia Rivas MD;  Location: [facility] CATH LAB;  Service: Cardiology;  Laterality: Right;    THYROIDECTOMY  2016    at [facility] for thyroid nodule     TONSILLECTOMY      TRANSPOSITION OF BASILIC VEIN Left 1/31/2023    Procedure: TRANSPOSITION, VEIN, BASILIC;  Surgeon: Tirso Tavera MD;  Location: [facility] OR [address] [place] [address];  Service: Peripheral Vascular;  Laterality: Left;     Allergies:     Review of patient's allergies indicates:   Allergen Reactions    Atorvastatin Other (See Comments)     Statin induced myalgia    Mushroom Hives    Peanut Anaphylaxis    Bactrim [sulfamethoxazole-trimethoprim] Hives    Gabapentin      Pruritis     Iodine     Peanut butter flavor     Penicillins Hives    Shellfish containing products Itching    Sulfa (sulfonamide antibiotics) Hives     Medications:     Current Outpatient Medications on File Prior to Visit   Medication Sig Dispense Refill    calcium acetate,phosphat bind, (PHOSLO) 667 mg capsule Take 2 capsules by mouth 3 (three) times daily 180 capsule 11    cholecalciferol, vitamin D3, 125 mcg (5,000 unit) Tab Take 1 tablet (5,000 Units total) by mouth once daily. 90 tablet 3    diphenhydrAMINE (BANOPHEN) 50 MG capsule Take 1 capsule by mouth 1 hour prior to test (Patient not taking: Reported on 12/1/2023) 1 capsule 0    EScitalopram oxalate (LEXAPRO) 10 MG tablet Take 1 tablet (10 mg total) by mouth once daily. (Patient taking differently: Take 10 mg by mouth every evening.) 90 tablet 3    ferrous gluconate (FERGON) 324 MG tablet Take 1 tablet (324 mg total) by mouth daily with breakfast. 90 tablet 1    hydrALAZINE (APRESOLINE) 50 MG tablet Take 1 tablet (50 mg total) by mouth every 12 (twelve) hours. 180 tablet 1    irbesartan (AVAPRO) 300 MG tablet Take 1 tablet by mouth daily 30 tablet 11    levocetirizine (XYZAL) 5 MG tablet Take 1 tablet (5 mg total) by mouth daily as needed for Allergies. 90 tablet 3    NIFEdipine (ADALAT CC) 60 MG TbSR Take 1 tablet by mouth daily 30 tablet 11    OPW TEST CLAIM - DO NOT FILL OPW test claim. Do not fill. (Patient not taking: Reported on 12/1/2023) 1 tablet 0    predniSONE (DELTASONE) 50 MG Tab  Take 1 tablet by mouth 13 hour prior to test, 1 tablet 7 hour prior to test, and 1 tablet at 1 hour prior to test (Patient not taking: Reported on 12/1/2023) 3 tablet 0    SYNTHROID 200 mcg tablet Take 1 tablet (200 mcg total) by mouth before breakfast. 90 tablet 3    traZODone (DESYREL) 50 MG tablet Take 1 to 2 tablets ( mg total) by mouth nightly as needed for Insomnia. 120 tablet 3     No current facility-administered medications on file prior to visit.     Social History:     Social History     Tobacco Use    Smoking status: Never     Passive exposure: Past    Smokeless tobacco: Never   Substance Use Topics    Alcohol use: Not Currently     Family History:     Family History   Problem Relation Age of Onset    Stroke Mother     Heart disease Mother     Hypertension Mother     Diabetes type II Mother     Coronary artery disease Mother     Hypertension Father     Prostate cancer Father     Aneurysm Brother      Physical Exam:   There were no vitals taken for this visit.       Physical Exam   Not performed as this was a virtual visit.     Labs:     Lab Results   Component Value Date     11/29/2023    K 3.9 11/29/2023     11/29/2023    CO2 21 (L) 11/29/2023    BUN 45 (H) 11/29/2023    CREATININE 4.1 (H) 11/29/2023    ANIONGAP 13 11/29/2023     Lab Results   Component Value Date    HGBA1C 5.3 08/01/2023     Lab Results   Component Value Date     (H) 08/10/2023     (H) 07/21/2023     (H) 06/05/2023    Lab Results   Component Value Date    WBC 8.48 11/29/2023    HGB 9.8 (L) 11/29/2023    HCT 30.2 (L) 11/29/2023    HCT 29 (L) 02/01/2023     (L) 11/29/2023    GRAN 5.5 11/29/2023    GRAN 64.7 11/29/2023     Lab Results   Component Value Date    CHOL 179 11/17/2022    HDL 53 11/17/2022    LDLCALC 105.6 11/17/2022    TRIG 102 11/17/2022          Imaging:   Echocardiograms:   TTE 2/6/2023  Moderate left atrial enlargement.  The left ventricle is normal in size with concentric  hypertrophy and normal systolic function.  Left ventricular diastolic dysfunction.  Normal right ventricular size with normal right ventricular systolic function.  Mild mitral regurgitation.  Moderate tricuspid regurgitation.  Intermediate central venous pressure (8 mmHg).  There is pulmonary hypertension. The estimated PA systolic pressure is 58 mmHg.  Small lateral pericardial effusion.  The estimated PA systolic pressure is 58 mmHg.    TTE 9/12/2022  Severe left atrial enlargement.  The left ventricle is normal in size with mild eccentric hypertrophy and normal systolic function.  The estimated ejection fraction is 65%.  Indeterminate left ventricular diastolic function.  Normal right ventricular size with normal right ventricular systolic function.  Mild mitral regurgitation.  Normal central venous pressure (3 mmHg).    TTE 2/8/2019  Mild left atrial enlargement.  Mild left ventricular enlargement.  Normal left ventricular systolic function. The estimated ejection fraction is 55%  Indeterminate left ventricular diastolic function.  Normal right ventricular systolic function.  Mild mitral regurgitation.  Mild to moderate tricuspid regurgitation.  The estimated PA systolic pressure is 32 mm Hg  Normal central venous pressure (3 mm Hg).  Trivial peircumferential pericardial effusion.    TTE 1/16/2019  Normal left ventricular systolic function. The estimated ejection fraction is 60%  Concentric left ventricular hypertrophy.  Indeterminate left ventricular diastolic function.  No wall motion abnormalities.  Normal right ventricular systolic function.  Severe left atrial enlargement.  Mild pulmonic regurgitation.  Mild mitral regurgitation.  The estimated PA systolic pressure is 33 mm Hg  Normal central venous pressure (3 mm Hg).    Stress Tests:   PET Stress Test 11/28/2022    The myocardial perfusion images are normal without evidence of ischemia or scar.    The whole heart absolute myocardial perfusion values  averaged 1.03 cc/min/g at rest, which is elevated; 1.40 cc/min/g at stress, which is mildly reduced; and CFR is 1.36 , which is moderately reduced in part due to elevated resting flow.    CT attenuation images demonstrate no coronary calcifications and no aortic calcifications.    The gated perfusion images showed an ejection fraction of 67% at rest and 68% during stress. A normal ejection fraction is greater than 47%.    The wall motion is normal at rest and during stress.    The LV cavity size is normal at rest and stress.    The EKG portion of this study is negative for ischemia.    During stress, occasional PVCs are noted.    The patient reported no chest pain during the stress test.    There are no prior studies for comparison.    Caths:   RHC 6/15/2023  Normal CO/CI.  Mildly increased right and left sided filling pressures.  Mild pulmonary hypertension.  TPG 8 PVR 1.51  SVR 1594  Systolic blood pressures 170-180 throughout procedure.       The estimated blood loss was <50 mL.    Likely WHO group 2 pulmonary hypertension in setting of elevated systolic blood pressures. Recommend improved blood pressure control. Volume mildly increased however may improve as well with improved blood pressure management.    Other:  Chest CT 2/1/2023  Aorta: Left-sided aortic arch with 3 arterial branches. The aorta maintains normal caliber, contour and course. There are no calcifications of the thoracic aorta.  Calcific atherosclerosis of the aortic valve annulus and aortic valve leaflets.     Heart/pericardium: Enlarged.  There are minimal calcifications of the coronary arteries.  Small to moderate volume pericardial fluid, increased when compared with prior CT.  Central vascular congestion.      Assessment:     1. Pre-kidney transplant, listed    2. Renovascular hypertension    3. Coronary artery calcification    4. Mixed hyperlipidemia    5. Pulmonary hypertension    6. ESRD on dialysis    7. Type 2 diabetes mellitus with  chronic kidney disease on chronic dialysis, without long-term current use of insulin    8. History of CVA (cerebrovascular accident)    9. Class 2 severe obesity due to excess calories with serious comorbidity and body mass index (BMI) of 35.0 to 35.9 in adult    10. AVF (arteriovenous fistula)      Plan:     Pre-kidney transplant   Patient has no symptoms of cardiac ischemia, heart failure, or significant arrhythmias. She can easily achieve 4 METS. The patient is in optimal condition for any type of surgery at this time, although she remains at a relatively high risk for surgery given her past history (although remote) of congestive heart failure and CVA.  No change in therapy or further cardiovascular evaluation is needed at this time.     Hypertension  Mildly elevated on Hydralazine, Nifedipine, and Irbesartan  Further management per Nephrology     Coronary artery calcification  Minimal calcification seen on chest CT. PET stress 11/2022 negative for ischemia  Denies angina    Hyperlipidemia  She would benefit from statin therapy given coronary calcification on chest CT and risk factors. Defer to PCP    Pulmonary HTN   RHC 6/2023 with mild pulm HTN, likely WHO group 2 pulmonary hypertension in setting of elevated systolic blood pressures. Per heart transplant, this does not preclude kidney transplant.   Continue to optimize BP control    ESRD on HD  AVF  Cont volume management via HD    Type 2 DM  Well controlled     Follow up PRN    Signed:  Lainey Jackson PA-C  Cardiology   [phone] - General

## 2024-01-24 ENCOUNTER — OFFICE VISIT (OUTPATIENT)
Dept: INTERNAL MEDICINE | Facility: CLINIC | Age: 64
End: 2024-01-24
Payer: MEDICARE

## 2024-01-24 VITALS
HEIGHT: 70 IN | HEART RATE: 80 BPM | SYSTOLIC BLOOD PRESSURE: 130 MMHG | WEIGHT: 237.44 LBS | BODY MASS INDEX: 33.99 KG/M2 | DIASTOLIC BLOOD PRESSURE: 80 MMHG | OXYGEN SATURATION: 95 %

## 2024-01-24 DIAGNOSIS — Z99.2 TYPE 2 DIABETES MELLITUS WITH CHRONIC KIDNEY DISEASE ON CHRONIC DIALYSIS, WITHOUT LONG-TERM CURRENT USE OF INSULIN: ICD-10-CM

## 2024-01-24 DIAGNOSIS — N18.6 TYPE 2 DIABETES MELLITUS WITH END-STAGE RENAL DISEASE: ICD-10-CM

## 2024-01-24 DIAGNOSIS — E55.9 VITAMIN D DEFICIENCY: ICD-10-CM

## 2024-01-24 DIAGNOSIS — D63.1 ANEMIA IN STAGE 5 CHRONIC KIDNEY DISEASE, NOT ON CHRONIC DIALYSIS: ICD-10-CM

## 2024-01-24 DIAGNOSIS — E11.42 DIABETIC POLYNEUROPATHY ASSOCIATED WITH TYPE 2 DIABETES MELLITUS: Chronic | ICD-10-CM

## 2024-01-24 DIAGNOSIS — N18.6 ESRD ON DIALYSIS: ICD-10-CM

## 2024-01-24 DIAGNOSIS — Z86.19 HISTORY OF HEPATITIS C: ICD-10-CM

## 2024-01-24 DIAGNOSIS — N18.6 TYPE 2 DIABETES MELLITUS WITH CHRONIC KIDNEY DISEASE ON CHRONIC DIALYSIS, WITHOUT LONG-TERM CURRENT USE OF INSULIN: ICD-10-CM

## 2024-01-24 DIAGNOSIS — E66.01 CLASS 2 SEVERE OBESITY DUE TO EXCESS CALORIES WITH SERIOUS COMORBIDITY AND BODY MASS INDEX (BMI) OF 35.0 TO 35.9 IN ADULT: ICD-10-CM

## 2024-01-24 DIAGNOSIS — N18.5 CHRONIC KIDNEY DISEASE (CKD), STAGE V: Chronic | ICD-10-CM

## 2024-01-24 DIAGNOSIS — Z99.2 END STAGE RENAL FAILURE ON DIALYSIS: ICD-10-CM

## 2024-01-24 DIAGNOSIS — E11.22 TYPE 2 DIABETES MELLITUS WITH END-STAGE RENAL DISEASE: ICD-10-CM

## 2024-01-24 DIAGNOSIS — M85.89 OSTEOPENIA OF MULTIPLE SITES: ICD-10-CM

## 2024-01-24 DIAGNOSIS — E89.0 POSTOPERATIVE HYPOTHYROIDISM: Chronic | ICD-10-CM

## 2024-01-24 DIAGNOSIS — N18.6 END STAGE RENAL FAILURE ON DIALYSIS: ICD-10-CM

## 2024-01-24 DIAGNOSIS — Z00.00 ENCOUNTER FOR PREVENTIVE HEALTH EXAMINATION: Primary | ICD-10-CM

## 2024-01-24 DIAGNOSIS — D50.8 OTHER IRON DEFICIENCY ANEMIA: ICD-10-CM

## 2024-01-24 DIAGNOSIS — D68.1 FACTOR XI DEFICIENCY: Chronic | ICD-10-CM

## 2024-01-24 DIAGNOSIS — N25.81 SECONDARY HYPERPARATHYROIDISM OF RENAL ORIGIN: ICD-10-CM

## 2024-01-24 DIAGNOSIS — E11.3553 CONTROLLED TYPE 2 DIABETES MELLITUS WITH STABLE PROLIFERATIVE RETINOPATHY OF BOTH EYES, UNSPECIFIED WHETHER LONG TERM INSULIN USE: Chronic | ICD-10-CM

## 2024-01-24 DIAGNOSIS — Z99.2 ESRD ON DIALYSIS: ICD-10-CM

## 2024-01-24 DIAGNOSIS — E11.22 TYPE 2 DIABETES MELLITUS WITH CHRONIC KIDNEY DISEASE ON CHRONIC DIALYSIS, WITHOUT LONG-TERM CURRENT USE OF INSULIN: ICD-10-CM

## 2024-01-24 DIAGNOSIS — N18.5 ANEMIA IN STAGE 5 CHRONIC KIDNEY DISEASE, NOT ON CHRONIC DIALYSIS: ICD-10-CM

## 2024-01-24 DIAGNOSIS — G31.84 MILD COGNITIVE IMPAIRMENT: ICD-10-CM

## 2024-01-24 DIAGNOSIS — Z00.00 ENCOUNTER FOR MEDICARE ANNUAL WELLNESS EXAM: ICD-10-CM

## 2024-01-24 DIAGNOSIS — R80.9 NEPHROTIC RANGE PROTEINURIA: ICD-10-CM

## 2024-01-24 DIAGNOSIS — Z86.73 HISTORY OF CVA (CEREBROVASCULAR ACCIDENT): ICD-10-CM

## 2024-01-24 PROCEDURE — G0439 PPPS, SUBSEQ VISIT: HCPCS | Mod: HCNC,S$GLB,, | Performed by: NURSE PRACTITIONER

## 2024-01-24 PROCEDURE — 1159F MED LIST DOCD IN RCRD: CPT | Mod: HCNC,CPTII,S$GLB, | Performed by: NURSE PRACTITIONER

## 2024-01-24 PROCEDURE — 99999 PR PBB SHADOW E&M-EST. PATIENT-LVL V: CPT | Mod: PBBFAC,HCNC,, | Performed by: NURSE PRACTITIONER

## 2024-01-24 PROCEDURE — G9919 SCRN ND POS ND PROV OF REC: HCPCS | Mod: HCNC,CPTII,S$GLB, | Performed by: NURSE PRACTITIONER

## 2024-01-24 PROCEDURE — 3079F DIAST BP 80-89 MM HG: CPT | Mod: HCNC,CPTII,S$GLB, | Performed by: NURSE PRACTITIONER

## 2024-01-24 PROCEDURE — 3075F SYST BP GE 130 - 139MM HG: CPT | Mod: HCNC,CPTII,S$GLB, | Performed by: NURSE PRACTITIONER

## 2024-01-24 PROCEDURE — 4010F ACE/ARB THERAPY RXD/TAKEN: CPT | Mod: HCNC,CPTII,S$GLB, | Performed by: NURSE PRACTITIONER

## 2024-01-24 NOTE — PATIENT INSTRUCTIONS
Counseling and Referral of Other Preventative  (Italic type indicates deductible and co-insurance are waived)    Patient Name: Kathie Quezada  Today's Date: 1/24/2024    Health Maintenance       Date Due Completion Date    RSV Vaccine (Age 60+ and Pregnant patients) (1 - 1-dose 60+ series) Never done ---    Pneumococcal Vaccines (Age 0-64) (3 - PPSV23 or PCV20) 10/18/2023 11/8/2019    Lipid Panel 11/17/2023 11/17/2022    Hemoglobin A1c 02/21/2024 8/21/2023    Eye Exam 05/26/2024 5/26/2023    Foot Exam 08/01/2024 8/1/2023 (Done)    Override on 8/1/2023: Done    Override on 9/19/2022: Done    Override on 11/19/2021: Done    Override on 12/4/2020: Done (Mild neuopathy)    Override on 11/8/2019: Done (no lesions, mild neuropathy)    Mammogram 10/02/2024 10/2/2023    DEXA Scan 10/11/2024 10/11/2022    Colorectal Cancer Screening 11/13/2025 11/13/2015    TETANUS VACCINE 10/18/2028 10/18/2018        No orders of the defined types were placed in this encounter.    The following information is provided to all patients.  This information is to help you find resources for any of the problems found today that may be affecting your health:                  Living healthy guide: www.[facility].[place].gov      Understanding Diabetes: www.diabetes.org      Eating healthy: www.cdc.gov/healthyweight      CDC home safety checklist: www.cdc.gov/steadi/patient.html      Agency on Aging: www.goea.[place].[facility]      Alcoholics anonymous (AA): www.aa.org      Physical Activity: www.mallory.nih.gov/ei9jmmg      Tobacco use: www.quitwithusla.org

## 2024-01-24 NOTE — PROGRESS NOTES
"  Kathie Quezada presented for a follow-up Medicare AWV today. The following components were reviewed and updated:    Medical history  Family History  Social history  Allergies and Current Medications  Health Risk Assessment  Health Maintenance  Care Team    **See Completed Assessments for Annual Wellness visit with in the encounter summary    The following assessments were completed:  Depression Screening  Cognitive function Screening    Timed Get Up Test  Whisper Test      Opioid documentation:      Patient does not have a current opioid prescription.          Vitals:    01/24/24 0952   BP: 130/80   BP Location: Right arm   Patient Position: Sitting   BP Method: Large (Manual)   Pulse: 80   SpO2: 95%   Weight: 107.7 kg (237 lb 7 oz)   Height: 5' 10" (1.778 m)     Body mass index is 34.07 kg/m².       Physical Exam  Constitutional:       General: She is not in acute distress.     Appearance: Normal appearance. She is obese. She is not toxic-appearing.   HENT:      Head: Normocephalic.      Right Ear: Tympanic membrane and ear canal normal.      Left Ear: Tympanic membrane and ear canal normal.      Nose: Nose normal. No congestion.      Mouth/Throat:      Mouth: Mucous membranes are moist.      Pharynx: Oropharynx is clear. No oropharyngeal exudate.   Eyes:      Pupils: Pupils are equal, round, and reactive to light.   Cardiovascular:      Rate and Rhythm: Normal rate and regular rhythm.      Pulses: Normal pulses.      Heart sounds: Normal heart sounds.   Pulmonary:      Effort: Pulmonary effort is normal.      Breath sounds: Normal breath sounds.   Abdominal:      General: Abdomen is flat. Bowel sounds are normal.      Palpations: Abdomen is soft.   Musculoskeletal:         General: Normal range of motion.      Right lower leg: No edema.      Left lower leg: No edema.   Neurological:      General: No focal deficit present.      Mental Status: She is alert and oriented to person, place, and time.   Psychiatric:  "        Mood and Affect: Mood normal.       Diagnoses and health risks identified today and associated recommendations/orders:  1. Encounter for preventive health examination  All age and gender related screenings discussed     2. Encounter for Medicare annual wellness exam  All age and gender related screenings discussed   - Ambulatory Referral/Consult to Enhanced Annual Wellness Visit (eAWV)    3. History of CVA (cerebrovascular accident)  Problem is stable. Will continue current medication and treatment regimen. Follow up with PCP     4. Diabetic polyneuropathy associated with type 2 diabetes mellitus  Problem is stable. Will continue current medication and treatment regimen. Follow up with PCP     5. Type 2 diabetes mellitus with chronic kidney disease on chronic dialysis, without long-term current use of insulin  Problem is stable. Will continue current medication and treatment regimen. Follow up with PCP     6. Controlled type 2 diabetes mellitus with stable proliferative retinopathy of both eyes, unspecified whether long term insulin use  Problem is stable. Will continue current medication and treatment regimen. Follow up with PCP     7. Class 2 severe obesity due to excess calories with serious comorbidity and body mass index (BMI) of 35.0 to 35.9 in adult  Problem is stable. Will continue current medication and treatment regimen. Follow up with PCP     8. Postoperative hypothyroidism  Problem is stable. Will continue current medication and treatment regimen. Follow up with PCP     9. Vitamin D deficiency  Problem is stable. Will continue current medication and treatment regimen. Follow up with PCP     10. Anemia in stage 5 chronic kidney disease, not on chronic dialysis  Problem is stable. Will continue current medication and treatment regimen. Follow up with PCP and nephrology     11. Other iron deficiency anemia  Problem is stable. Will continue current medication and treatment regimen. Follow up with PCP      12. Factor XI deficiency  Problem is stable. Will continue current medication and treatment regimen. Follow up with PCP     13. Chronic kidney disease (CKD), stage V  Problem is stable. Will continue current medication and treatment regimen. Follow up with PCP and nephrology     14. End stage renal failure on dialysis  Problem is stable. Will continue current medication and treatment regimen. Follow up with PCP and nephrology     15. ESRD on dialysis  Problem is stable. Will continue current medication and treatment regimen. Follow up with PCP and nephrology     16. Nephrotic range proteinuria  Problem is stable. Will continue current medication and treatment regimen. Follow up with PCP and nephrology     17. History of hepatitis C  Problem is stable. Will continue current medication and treatment regimen. Follow up with PCP     18. Osteopenia of multiple sites  Problem is stable. Will continue current medication and treatment regimen. Follow up with PCP     19. Secondary hyperparathyroidism of renal origin  Problem is stable. Will continue current medication and treatment regimen. Follow up with PCP and nephrology     20. Type 2 diabetes mellitus with end-stage renal disease  Problem is stable. Will continue current medication and treatment regimen. Follow up with PCP       Provided Kathie with a 5-10 year written screening schedule and personal prevention plan. Recommendations were developed using the USPSTF age appropriate recommendations. Education, counseling, and referrals were provided as needed.  After Visit Summary printed and given to patient which includes a list of additional screenings\tests needed.    Post Discharge Follow-up Today:   Future Appointments:  Future Appointments   Date Time Provider Department Center   2/2/2024  3:00 PM Elan Price MD [facility]   2/7/2024  8:20 [facility]         I offered to discuss advanced care planning, including  how to pick a person who would make decisions for you if you were unable to make them for yourself, called a health care power of , and what kind of decisions you might make such as use of life sustaining treatments such as ventilators and tube feeding when faced with a life limiting illness recorded on a living will that they will need to know. (How you want to be cared for as you near the end of your natural life)     X  Patient has advanced directives on file, which we reviewed, and they do not wish to make changes.      Desire PACHECO, APRN, FNP-c  Nurse Practitioner   Internal Medicine   [address] [facility] [address]  [phone]

## 2024-01-29 ENCOUNTER — TELEPHONE (OUTPATIENT)
Dept: TRANSPLANT | Facility: CLINIC | Age: 64
End: 2024-01-29
Payer: MEDICARE

## 2024-01-29 DIAGNOSIS — E55.9 VITAMIN D DEFICIENCY: ICD-10-CM

## 2024-01-29 RX ORDER — ACETAMINOPHEN 500 MG
5000 TABLET ORAL DAILY
Qty: 90 TABLET | Refills: 3 | Status: SHIPPED | OUTPATIENT
Start: 2024-01-29

## 2024-01-29 NOTE — TELEPHONE ENCOUNTER
Spoke to patient about A2 letter. She states that  her daughter told her she is compatible to be her donor. Informed patient that if she decides to pursue A2 she can consent to it whenever she likes.

## 2024-01-29 NOTE — TELEPHONE ENCOUNTER
----- Message from Staci Lee RN sent at 1/29/2024 11:06 AM CST -----  Regarding: RE: Speak to coordinator  Contact: Kathie Ramsay. No letter sent     ----- Message -----  From: Dolores Rios RN  Sent: 1/29/2024  10:53 AM CST  To: [facility] Living Donor Coordinator  Subject: FW: Speak to coordinator                         Did  either of you send a letter to this patient?    ----- Message -----  From: Jacqui Kaminski  Sent: 1/29/2024  10:51 AM CST  To: Kidney Waitlisted Coordinator  Subject: Speak to coordinator                             Regarding:speak to coordinator          Name Of Caller: Kathie        Contact Preference: [phone] (home)        Nature of call:  pt is calling to speak to coordinator regarding a letter she received in regards to her daughter being a match for her. Please advise. Requesting a call back.

## 2024-01-31 ENCOUNTER — OFFICE VISIT (OUTPATIENT)
Dept: VASCULAR SURGERY | Facility: CLINIC | Age: 64
End: 2024-01-31
Payer: MEDICARE

## 2024-01-31 VITALS
BODY MASS INDEX: 35.27 KG/M2 | SYSTOLIC BLOOD PRESSURE: 140 MMHG | HEIGHT: 69 IN | TEMPERATURE: 98 F | HEART RATE: 80 BPM | WEIGHT: 238.13 LBS | DIASTOLIC BLOOD PRESSURE: 76 MMHG

## 2024-01-31 DIAGNOSIS — N18.6 ESRD ON DIALYSIS: Primary | ICD-10-CM

## 2024-01-31 DIAGNOSIS — Z99.2 ESRD ON DIALYSIS: Primary | ICD-10-CM

## 2024-01-31 PROCEDURE — 3077F SYST BP >= 140 MM HG: CPT | Mod: HCNC,CPTII,NTX,S$GLB | Performed by: SURGERY

## 2024-01-31 PROCEDURE — 3078F DIAST BP <80 MM HG: CPT | Mod: HCNC,CPTII,NTX,S$GLB | Performed by: SURGERY

## 2024-01-31 PROCEDURE — 99214 OFFICE O/P EST MOD 30 MIN: CPT | Mod: HCNC,NTX,S$GLB, | Performed by: SURGERY

## 2024-01-31 PROCEDURE — 1159F MED LIST DOCD IN RCRD: CPT | Mod: HCNC,CPTII,NTX,S$GLB | Performed by: SURGERY

## 2024-01-31 PROCEDURE — 4010F ACE/ARB THERAPY RXD/TAKEN: CPT | Mod: HCNC,CPTII,NTX,S$GLB | Performed by: SURGERY

## 2024-01-31 PROCEDURE — 3008F BODY MASS INDEX DOCD: CPT | Mod: HCNC,CPTII,NTX,S$GLB | Performed by: SURGERY

## 2024-01-31 PROCEDURE — 99999 PR PBB SHADOW E&M-EST. PATIENT-LVL III: CPT | Mod: PBBFAC,HCNC,TXP, | Performed by: SURGERY

## 2024-01-31 NOTE — PROGRESS NOTES
Kathie Quezada  01/31/2024    HPI:  Patient is a 63 y.o. female with a h/o obesity (BMI 34), stage V CKD on HD since Aug 2023, DMII with retinopathy, HTN, CVA, Factor 11 deficiency, and multiple AKIS the last was from urinary retention who is here today for f/u. She goes to HD on [facility], and they continue to use her HD cath.    Daughter is a transplant match, now working on getting transplant set up. Planning for this sometime this year.   She is R handed with an obese upper arm.  Last GFR 11.8, Cr 4.1. .     S/p   11/30/23  L Transradial access, BVT AVF fistulagram   PTA proximal stenoses x2 and mid-AVF stenosis with 7x60mm Mehran balloon    Findings/Key Components:  Successful treatment of proximal stenosis  Good palpable thrill    8/15/23  L TRA, L BVT AVF fistulagram  PTA proximal stenoses x2 with 6x40mm Mehran balloon; outflow stenosis with 6x40mm Mehran balloon     3/23/23  L TRA, L BVT AVF - PTA proximal diffuse stenoses with 6x80mm DCB (Lutonix) balloon    1/31/23  Transposed L BVT,. 2nd stage  Findings: 2nd stage L BVT AVF done; dilated to 5-6 mm with strong thrill throughout course.     12/16/22  L TRA, PTA proximal (diffuse) stenoses x3 with 6x60mm Sterlin balloon     10/27/22  Creation of 1st stage L BVT AVF     Findings/Key Components:  L basilic vein dilated to 3-4mm; segment of 8-10cm was mobilized at a/c fossa  Strong thrill  Triphasic L ulnar doppler signal with minimal augmentation with AVF compression  Obese upper arm (BMI 38)  Consider transposition in 3-6months+     No hx of MI  Hx of CHF, EF 65%%, enlarged RA   Stroke ~3 years ago, no residual deficits   Tobacco use: never     Current Outpatient Medications:     calcium acetate,phosphat bind, (PHOSLO) 667 mg capsule, Take 2 capsules by mouth 3 (three) times daily, Disp: 180 capsule, Rfl: 11    cholecalciferol, vitamin D3, 125 mcg (5,000 unit) Tab, Take 1 tablet (5,000 Units total) by mouth once daily., Disp: 90 tablet, Rfl: 3     EScitalopram oxalate (LEXAPRO) 10 MG tablet, Take 1 tablet (10 mg total) by mouth once daily. (Patient taking differently: Take 10 mg by mouth every evening.), Disp: 90 tablet, Rfl: 3    ferrous gluconate (FERGON) 324 MG tablet, Take 1 tablet (324 mg total) by mouth daily with breakfast., Disp: 90 tablet, Rfl: 1    hydrALAZINE (APRESOLINE) 50 MG tablet, Take 1 tablet (50 mg total) by mouth every 12 (twelve) hours., Disp: 180 tablet, Rfl: 1    irbesartan (AVAPRO) 300 MG tablet, Take 1 tablet by mouth daily, Disp: 30 tablet, Rfl: 11    levocetirizine (XYZAL) 5 MG tablet, Take 1 tablet (5 mg total) by mouth daily as needed for Allergies., Disp: 90 tablet, Rfl: 3    NIFEdipine (ADALAT CC) 60 MG TbSR, Take 1 tablet by mouth daily, Disp: 30 tablet, Rfl: 11    SYNTHROID 200 mcg tablet, Take 1 tablet (200 mcg total) by mouth before breakfast., Disp: 90 tablet, Rfl: 3    traZODone (DESYREL) 50 MG tablet, Take 1 to 2 tablets ( mg total) by mouth nightly as needed for Insomnia., Disp: 120 tablet, Rfl: 3    PHYSICAL EXAM:      Pulse: 80  Temp: 98.2 °F (36.8 °C)                 Neck: Supple, no significant adenopathy; thyroid is not enlarged          Abdomen: Soft, nontender, nondistended, no masses or organomegaly     No rebound tenderness noted; bowel sounds normal     No groin adenopathy      Extremities:   3 cm scar overlying L antecubital fossa      Significant obesity upper arm      Proximal L BVT AVF moderate thrill no pulsatility          LAB RESULTS:  Lab Results   Component Value Date    K 3.9 11/29/2023    K 3.7 11/28/2023    K 4.0 11/27/2023    CREATININE 4.1 (H) 11/29/2023    CREATININE 4.1 (H) 11/28/2023    CREATININE 4.2 (H) 11/27/2023     Lab Results   Component Value Date    WBC 8.48 11/29/2023    WBC 8.37 11/28/2023    WBC 9.16 11/27/2023    HCT 30.2 (L) 11/29/2023    HCT 33.8 (L) 11/28/2023    HCT 31.6 (L) 11/27/2023     (L) 11/29/2023     11/28/2023     (L) 11/27/2023     Lab  Results   Component Value Date    HGBA1C 5.3 2023    HGBA1C 5.2 2023    HGBA1C 5.5 2022     [id number]   AVF u/s: Mid bicep flow; 819 ml/min.  cm/sec noted in proximal segment  Diam 2.9 prox; then 7.9 - 8mm  Depth 9.6 - 25mm    Prior:  AVF u/s: flow 453 ml/min (prior: 734 ml/min) PSVs elevated 624 cms distal to anastomosis and 364cm/s distal segment    AVF u/s: flow vol 1,326 ml/min  PSVs 688 and 597 cm/s proximally -- 'long segment narrowing beyond anastomosis'    AVF u/s: flow vol 1127 ml/min  PSVs 706 cm/s proximally -- 'long segment narrowing beyond anastomosis'    IMP/PLAN:     63 y.o. female with a h/o DMII with retinopathy, HTN, stroke and obesity (BMI 40), and multiple AKIs the last was from urinary retention -- s/p L BVT AVF (2-stages) and prior PTA diffuse proximal stenosis -- recurrent proximal single and now mid- lesions  Significantly Obese L upper arm  Now s/p L TRA  and extensive PTA - good thrill  Despite elevated PSVs - good thrill and caliber  She has a match for transplant with her daughter and is beginning the process. If it seems that KTx can be done in the next 4-5 months, it's reasonable to wait for future LUE AVG - otherwise RTC in 5-6 months for LUE AVG creation     Tirso Tavera MD DFSVS FACS   Vascular/Endovascular Surgery

## 2024-02-02 ENCOUNTER — OFFICE VISIT (OUTPATIENT)
Dept: INTERNAL MEDICINE | Facility: CLINIC | Age: 64
End: 2024-02-02
Payer: MEDICARE

## 2024-02-02 VITALS
BODY MASS INDEX: 35.53 KG/M2 | HEART RATE: 84 BPM | WEIGHT: 239.88 LBS | HEIGHT: 69 IN | DIASTOLIC BLOOD PRESSURE: 88 MMHG | SYSTOLIC BLOOD PRESSURE: 136 MMHG | OXYGEN SATURATION: 96 %

## 2024-02-02 DIAGNOSIS — Z98.890 S/P ARTERIOVENOUS (AV) FISTULA CREATION: ICD-10-CM

## 2024-02-02 DIAGNOSIS — N25.81 SECONDARY HYPERPARATHYROIDISM OF RENAL ORIGIN: ICD-10-CM

## 2024-02-02 DIAGNOSIS — I50.32 CHRONIC DIASTOLIC HEART FAILURE: Chronic | ICD-10-CM

## 2024-02-02 DIAGNOSIS — M79.10 MYALGIA DUE TO STATIN: ICD-10-CM

## 2024-02-02 DIAGNOSIS — E11.22 TYPE 2 DIABETES MELLITUS WITH END-STAGE RENAL DISEASE: ICD-10-CM

## 2024-02-02 DIAGNOSIS — E11.42 DIABETIC POLYNEUROPATHY ASSOCIATED WITH TYPE 2 DIABETES MELLITUS: Chronic | ICD-10-CM

## 2024-02-02 DIAGNOSIS — F41.1 GENERALIZED ANXIETY DISORDER: Chronic | ICD-10-CM

## 2024-02-02 DIAGNOSIS — I15.0 RENOVASCULAR HYPERTENSION: Chronic | ICD-10-CM

## 2024-02-02 DIAGNOSIS — N18.6 TYPE 2 DIABETES MELLITUS WITH END-STAGE RENAL DISEASE: ICD-10-CM

## 2024-02-02 DIAGNOSIS — D63.1 ANEMIA IN STAGE 5 CHRONIC KIDNEY DISEASE, NOT ON CHRONIC DIALYSIS: ICD-10-CM

## 2024-02-02 DIAGNOSIS — E78.2 MIXED HYPERLIPIDEMIA: ICD-10-CM

## 2024-02-02 DIAGNOSIS — M89.8X9 METABOLIC BONE DISEASE: ICD-10-CM

## 2024-02-02 DIAGNOSIS — E55.9 VITAMIN D DEFICIENCY: ICD-10-CM

## 2024-02-02 DIAGNOSIS — E11.3553 CONTROLLED TYPE 2 DIABETES MELLITUS WITH STABLE PROLIFERATIVE RETINOPATHY OF BOTH EYES, UNSPECIFIED WHETHER LONG TERM INSULIN USE: Chronic | ICD-10-CM

## 2024-02-02 DIAGNOSIS — D68.1 FACTOR XI DEFICIENCY: Chronic | ICD-10-CM

## 2024-02-02 DIAGNOSIS — D50.8 OTHER IRON DEFICIENCY ANEMIA: ICD-10-CM

## 2024-02-02 DIAGNOSIS — T46.6X5A MYALGIA DUE TO STATIN: ICD-10-CM

## 2024-02-02 DIAGNOSIS — R80.9 NEPHROTIC RANGE PROTEINURIA: ICD-10-CM

## 2024-02-02 DIAGNOSIS — E66.01 CLASS 2 SEVERE OBESITY DUE TO EXCESS CALORIES WITH SERIOUS COMORBIDITY AND BODY MASS INDEX (BMI) OF 35.0 TO 35.9 IN ADULT: ICD-10-CM

## 2024-02-02 DIAGNOSIS — Z00.00 ANNUAL PHYSICAL EXAM: Primary | ICD-10-CM

## 2024-02-02 DIAGNOSIS — J30.9 CHRONIC ALLERGIC RHINITIS: Chronic | ICD-10-CM

## 2024-02-02 DIAGNOSIS — E89.0 POSTOPERATIVE HYPOTHYROIDISM: Chronic | ICD-10-CM

## 2024-02-02 DIAGNOSIS — N18.6 ESRD ON HEMODIALYSIS: ICD-10-CM

## 2024-02-02 DIAGNOSIS — N18.5 ANEMIA IN STAGE 5 CHRONIC KIDNEY DISEASE, NOT ON CHRONIC DIALYSIS: ICD-10-CM

## 2024-02-02 DIAGNOSIS — Z99.2 ESRD ON HEMODIALYSIS: ICD-10-CM

## 2024-02-02 DIAGNOSIS — I27.20 PULMONARY HYPERTENSION: ICD-10-CM

## 2024-02-02 PROBLEM — T82.49XA CLOTTED DIALYSIS ACCESS: Status: RESOLVED | Noted: 2023-07-22 | Resolved: 2024-02-02

## 2024-02-02 PROBLEM — Z86.73 HISTORY OF CVA (CEREBROVASCULAR ACCIDENT): Status: RESOLVED | Noted: 2022-11-27 | Resolved: 2024-02-02

## 2024-02-02 PROCEDURE — 99396 PREV VISIT EST AGE 40-64: CPT | Mod: HCNC,S$GLB,, | Performed by: INTERNAL MEDICINE

## 2024-02-02 PROCEDURE — 99999 PR PBB SHADOW E&M-EST. PATIENT-LVL III: CPT | Mod: PBBFAC,HCNC,, | Performed by: INTERNAL MEDICINE

## 2024-02-02 PROCEDURE — 3075F SYST BP GE 130 - 139MM HG: CPT | Mod: HCNC,CPTII,S$GLB, | Performed by: INTERNAL MEDICINE

## 2024-02-02 PROCEDURE — 4010F ACE/ARB THERAPY RXD/TAKEN: CPT | Mod: HCNC,CPTII,S$GLB, | Performed by: INTERNAL MEDICINE

## 2024-02-02 PROCEDURE — 3079F DIAST BP 80-89 MM HG: CPT | Mod: HCNC,CPTII,S$GLB, | Performed by: INTERNAL MEDICINE

## 2024-02-02 PROCEDURE — 3008F BODY MASS INDEX DOCD: CPT | Mod: HCNC,CPTII,S$GLB, | Performed by: INTERNAL MEDICINE

## 2024-02-02 PROCEDURE — 1159F MED LIST DOCD IN RCRD: CPT | Mod: HCNC,CPTII,S$GLB, | Performed by: INTERNAL MEDICINE

## 2024-02-02 RX ORDER — LEVOTHYROXINE SODIUM 200 UG/1
200 TABLET ORAL
Qty: 90 TABLET | Refills: 3 | Status: SHIPPED | OUTPATIENT
Start: 2024-02-02

## 2024-02-02 RX ORDER — LEVOCETIRIZINE DIHYDROCHLORIDE 5 MG/1
5 TABLET, FILM COATED ORAL DAILY PRN
Qty: 90 TABLET | Refills: 3 | Status: SHIPPED | OUTPATIENT
Start: 2024-02-02

## 2024-02-02 RX ORDER — TRAZODONE HYDROCHLORIDE 50 MG/1
50-100 TABLET ORAL NIGHTLY PRN
Qty: 120 TABLET | Refills: 3 | Status: SHIPPED | OUTPATIENT
Start: 2024-02-02

## 2024-02-02 RX ORDER — EZETIMIBE 10 MG/1
10 TABLET ORAL DAILY
Qty: 90 TABLET | Refills: 3 | Status: SHIPPED | OUTPATIENT
Start: 2024-02-02 | End: 2025-02-01

## 2024-02-02 RX ORDER — ESCITALOPRAM OXALATE 10 MG/1
10 TABLET ORAL NIGHTLY
Qty: 90 TABLET | Refills: 3 | Status: SHIPPED | OUTPATIENT
Start: 2024-02-02

## 2024-02-02 NOTE — PROGRESS NOTES
INTERNAL MEDICINE CLINIC  Follow-up Visit Progress Note     PRESENTING HISTORY     PCP: Elan Price MD    Last Clinic Visit with me:  8-2023    Current Chief Complaint/Problem:    Chief Complaint   Patient presents with    Annual Exam      History of Present Illness & ROS: Ms. Kathie Quezada is a 63 y.o. female.    Review of Systems:  Answers submitted by the patient for this visit:  Review of Systems Questionnaire (Submitted on 1/29/2024)  activity change: No  unexpected weight change: No  neck pain: No  hearing loss: No  rhinorrhea: No  trouble swallowing: No  eye discharge: No  visual disturbance: No  chest tightness: No  wheezing: No  chest pain: No  palpitations: No  blood in stool: No  constipation: No  vomiting: No  diarrhea: No  polydipsia: No  polyuria: No  difficulty urinating: No  hematuria: No  menstrual problem: No  dysuria: No  joint swelling: No  arthralgias: No  headaches: No  weakness: No  confusion: No  dysphoric mood: No    PAST HISTORY:     Past Medical History:   Diagnosis Date    Acute respiratory failure with hypoxia and hypercapnia 05/26/2019    ATN (acute tubular necrosis) 02/08/2019    Chronic diastolic heart failure 02/07/2019 2-8-19 Mild left atrial enlargement. Mild left ventricular enlargement. Normal left ventricular systolic function. The estimated ejection fraction is 55% Indeterminate left ventricular diastolic function. Normal right ventricular systolic function. Mild mitral regurgitation. Mild to moderate tricuspid regurgitation. The estimated PA systolic pressure is 32 mm Hg Normal central venous pressure (3 m    CKD (chronic kidney disease) stage 4, GFR 15-29 ml/min 05/16/2018    CKD stage G3a/A3, GFR 45-59 and albumin creatinine ratio >300 mg/g 05/16/2018    CKD stage G3b/A3, GFR 30-44 and albumin creatinine ratio >300 mg/g 05/16/2018    Closed compression fracture of L2 lumbar vertebra 05/24/2019    Closed compression fracture of L2 lumbar vertebra 05/24/2019      IMO Regulatory Load October 2019    Clotted dialysis access 07/22/2023    Controlled type 2 diabetes with retinopathy 04/09/2018    COVID-19 virus infection 7-1-2021 07/01/2021    CVA (cerebral vascular accident) 2017    Diabetic polyneuropathy associated with type 2 diabetes mellitus 12/04/2020    Encounter for blood transfusion     Essential hypertension 04/09/2018    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  7-31-19 Factor XI Activity 55 - 145 % 96       Gastroesophageal reflux disease without esophagitis 05/24/2019    Generalized anxiety disorder 11/08/2019    GERD (gastroesophageal reflux disease)     GIB (gastrointestinal bleeding) 10/18/2018    History of CVA (cerebrovascular accident) 11/27/2022    History of CVA (cerebrovascular accident) without residual deficits 11/27/2022    History of hepatitis C     s/p succesful Rx w/ [facility]  - SVR12 (cure) 2016    Hypertensive retinopathy, bilateral 12/10/2019    Mild nonproliferative diabetic retinopathy 05/02/2018    Mixed hyperlipidemia 04/09/2018    Myalgia due to statin 09/07/2022    Normocytic anemia 01/14/2022    NS (nuclear sclerosis), bilateral 12/10/2019    Peanut allergy 02/04/2023    PNA (pneumonia) 05/28/2019    Postoperative hypothyroidism     Pulmonary hypertension 02/06/2023    Renovascular hypertension 04/09/2018    S/P kyphoplasty 08/14/2019 8/14/19: L2 kyphoplasty with Dr. Kwon     Slow transit constipation 05/27/2019    Type 2 diabetes mellitus with circulatory disorder, with long-term current use of insulin 04/09/2018    Type 2 diabetes mellitus with stage 3 chronic kidney disease, without long-term current use of insulin 04/09/2018    Type 2 diabetes mellitus with stage 3a chronic kidney disease, without long-term current use of insulin 03/03/2021    Type 2 diabetes mellitus with stage 3b chronic kidney disease, without long-term current use of insulin 09/07/2022       Past Surgical History:   Procedure Laterality Date     AV FISTULA PLACEMENT Left 10/27/2022    Procedure: CREATION, AV FISTULA;  Surgeon: Tirso Tavera MD;  Location: [facility] OR [facility] FLR;  Service: Peripheral Vascular;  Laterality: Left;  Brachio basilic 1st stage     BACK SURGERY  2017    CHOLECYSTECTOMY  2015    [facility]    COLONOSCOPY  11/13/2015    Dr Chino trores prep. internal hemorrhoids. diverticulosis of sigmois. NO polyps. repeat due 2025    FISTULOGRAM N/A 12/16/2022    Procedure: Fistulogram;  Surgeon: Tirso Tavera MD;  Location: [facility] CATH LAB;  Service: Peripheral Vascular;  Laterality: N/A;    FISTULOGRAM, WITH PTA Left 3/23/2023    Procedure: FISTULOGRAM, WITH PTA;  Surgeon: Tirso Tavera MD;  Location: [facility] OR [facility] FLR;  Service: Peripheral Vascular;  Laterality: Left;  LUE TRANSRADIAL CO2 FISTULOGRAM    1.9 min  22.99 mGy  4.3032 Gy.cm  200cc CO2  9ml TransRadial Solution    FISTULOGRAM, WITH PTA Left 8/15/2023    Procedure: FISTULOGRAM, WITH PTA TRANSRADIAL;  Surgeon: Tirso Tavera MD;  Location: [facility] OR [facility] FLR;  Service: Peripheral Vascular;  Laterality: Left;  Fluoro: 5.1 min, mGy: 7.95, Gycm2: 1.2626, contrast: 12ml    FIXATION KYPHOPLASTY Bilateral 08/14/2019    Procedure: L2 kyphoplasty Fluoro Globus;  Surgeon: Jeremie Kwon DO;  Location: [facility] OR;  Service: Neurosurgery;  Laterality: Bilateral;  GLOBUS MACRINA WINSLOW [phone] TEXTED HER @ 10:54AM ON 7-30-19  PRE-OP-BY RN  8-7-2019    HYSTERECTOMY      OOPHORECTOMY  1996    one    PERCUTANEOUS TRANSLUMINAL ANGIOPLASTY OF ARTERIOVENOUS FISTULA Left 12/16/2022    Procedure: PTA, AV FISTULA;  Surgeon: Tirso Tavera MD;  Location: [facility] CATH LAB;  Service: Peripheral Vascular;  Laterality: Left;    PERCUTANEOUS TRANSLUMINAL ANGIOPLASTY OF ARTERIOVENOUS FISTULA Left 11/30/2023    Procedure: PTA, AV FISTULA;  Surgeon: Tirso Tavera MD;  Location: [facility] CATH LAB;  Service: Peripheral Vascular;  Laterality: Left;  left transradial fistulagram    RIGHT HEART CATHETERIZATION Right  6/15/2023    Procedure: INSERTION, CATHETER, RIGHT HEART;  Surgeon: Lucretia Rivas MD;  Location: [facility] CATH LAB;  Service: Cardiology;  Laterality: Right;    THYROIDECTOMY  2016    at [facility] for thyroid nodule    TONSILLECTOMY      TRANSPOSITION OF BASILIC VEIN Left 1/31/2023    Procedure: TRANSPOSITION, VEIN, BASILIC;  Surgeon: Tirso Tavera MD;  [facility];  Service: Peripheral Vascular;  Laterality: Left;       Family History   Problem Relation Age of Onset    Stroke Mother     Heart disease Mother     Hypertension Mother     Diabetes type II Mother     Coronary artery disease Mother     Hypertension Father     Prostate cancer Father     Aneurysm Brother        Social History     Socioeconomic History    Marital status:    Tobacco Use    Smoking status: Never     Passive exposure: Past    Smokeless tobacco: Never   Substance and Sexual Activity    Alcohol use: Not Currently    Drug use: No    Sexual activity: Not Currently   Social History Narrative    Pt enjoys cooking and baking and making Floral arrangement and [employer]     Caregiver Daughter Mike     Social Determinants of Health     Financial Resource Strain: Low Risk  (1/24/2024)    Overall Financial Resource Strain (CARDIA)     Difficulty of Paying Living Expenses: Not very hard   Food Insecurity: Food Insecurity Present (1/24/2024)    Hunger Vital Sign     Worried About Running Out of Food in the Last Year: Sometimes true     Ran Out of Food in the Last Year: Never true   Transportation Needs: No Transportation Needs (1/24/2024)    PRAPARE - Transportation     Lack of Transportation (Medical): No     Lack of Transportation (Non-Medical): No   Physical Activity: Insufficiently Active (1/24/2024)    Exercise Vital Sign     Days of Exercise per Week: 3 days     Minutes of Exercise per Session: 20 min   Stress: No Stress Concern Present (1/24/2024)    [employer] - Occupational Stress Questionnaire      Feeling of Stress : Not at all   Social Connections: Unknown (1/24/2024)    Social Connection and Isolation Panel [NHANES]     Frequency of Communication with Friends and Family: More than three times a week     Frequency of Social Gatherings with Friends and Family: More than three times a week     Attends Evangelical Services: Patient declined     Active Member of Clubs or Organizations: Patient declined     Attends Club or Organization Meetings: Patient declined     Marital Status:    Housing Stability: Low Risk  (1/24/2024)    Housing Stability Vital Sign     Unable to Pay for Housing in the Last Year: No     Number of Places Lived in the Last Year: 1     Unstable Housing in the Last Year: No       MEDICATIONS & ALLERGIES:     Current Outpatient Medications on File Prior to Visit   Medication Sig Dispense Refill    calcium acetate,phosphat bind, (PHOSLO) 667 mg capsule Take 2 capsules by mouth 3 (three) times daily 180 capsule 11    cholecalciferol, vitamin D3, 125 mcg (5,000 unit) Tab Take 1 tablet (5,000 Units total) by mouth once daily. 90 tablet 3    ferrous gluconate (FERGON) 324 MG tablet Take 1 tablet (324 mg total) by mouth daily with breakfast. 90 tablet 1    hydrALAZINE (APRESOLINE) 50 MG tablet Take 1 tablet (50 mg total) by mouth every 12 (twelve) hours. 180 tablet 1    irbesartan (AVAPRO) 300 MG tablet Take 1 tablet by mouth daily 30 tablet 11    NIFEdipine (ADALAT CC) 60 MG TbSR Take 1 tablet by mouth daily 30 tablet 11    [DISCONTINUED] EScitalopram oxalate (LEXAPRO) 10 MG tablet Take 1 tablet (10 mg total) by mouth once daily. (Patient taking differently: Take 10 mg by mouth every evening.) 90 tablet 3    levocetirizine (XYZAL) 5 MG tablet Take 1 tablet (5 mg total) by mouth daily as needed for Allergies. 90 tablet 3    SYNTHROID 200 mcg tablet Take 1 tablet (200 mcg total) by mouth before breakfast. 90 tablet 3     traZODone (DESYREL) 50 MG tablet Take 1 to 2 tablets ( mg total)  by mouth nightly as needed for Insomnia. 120 tablet 3     No current facility-administered medications on file prior to visit.        Review of patient's allergies indicates:   Allergen Reactions    Atorvastatin Other (See Comments)     Statin induced myalgia    Mushroom Hives    Peanut Anaphylaxis    Bactrim [sulfamethoxazole-trimethoprim] Hives    Gabapentin      Pruritis     Iodine     Peanut butter flavor     Penicillins Hives    Shellfish containing products Itching    Sulfa (sulfonamide antibiotics) Hives       Medications Reconciliation:   I have reconciled the patient's home medications and discharge medications with the patient/family. I have updated all changes.  Refer to After-Visit Medication List.    OBJECTIVE:     Vital Signs:  Vitals:    02/02/24 1450   BP: 136/88   Pulse: 84     Wt Readings from Last 3 Encounters:   02/02/24 1450 108.8 kg (239 lb 13.8 oz)   01/31/24 1323 108 kg (238 lb 1.6 oz)   01/24/24 0952 107.7 kg (237 lb 7 oz)     Body mass index is 35.42 kg/m².     Physical Exam:  General: Well developed, well nourished. No distress.  HEENT: Head is normocephalic, atraumatic  Eyes: Clear conjunctiva.  Neck: Supple, symmetrical neck; trachea midline.  Lungs: Clear to auscultation bilaterally and normal respiratory effort.  Cardiovascular: Heart with regular rate and rhythm.    Extremities: No LE edema.   Abdomen: Abdomen is soft, non-tender non-distended with normal bowel sounds.  Skin: Skin color, texture, turgor normal. No rashes.  Musculoskeletal: Normal gait.   Psychiatric: Normal affect. Alert.    Laboratory  Lab Results   Component Value Date    WBC 8.48 11/29/2023    HGB 9.8 (L) 11/29/2023    HCT 30.2 (L) 11/29/2023     (L) 11/29/2023    CHOL 179 11/17/2022    TRIG 102 11/17/2022    HDL 53 11/17/2022    ALT 6 (L) 11/29/2023    AST 13 11/29/2023     11/29/2023    K 3.9 11/29/2023     11/29/2023    CREATININE 4.1 (H) 11/29/2023    BUN 45 (H) 11/29/2023    CO2 21 (L)  11/29/2023    TSH 43.212 (H) 09/11/2022    INR 1.1 08/14/2023    HGBA1C 5.3 08/01/2023       ASSESSMENT & PLAN:     Annual physical exam  - Reviewed and updated past and current medical problems.  Discussed treatment of current medical problems    -     Hemoglobin A1C; Future; Expected date: 02/02/2024  -     Lipid Panel; Future; Expected date: 02/02/2024  -     TSH; Future; Expected date: 02/02/2024    ESRD on hemodialysis  Nephrotic range proteinuria  Anemia in CKD 5  S/p AV fistula  Metabolic bone disease  Secondary hyperparathyroidism  Iron deficiency anemia.    On HD Tue/Thu/Sat.  -  Phoslo TID.        Iron 325 mg daily.    Vascular 1-2024:  She has a match for transplant with her daughter and is beginning the process. If it seems that KTx can be done in the next 4-5 months, it's reasonable to wait for future LUE AVG - otherwise RTC in 5-6 months for LUE AVG creation      Chronic diastolic heart failure  Pulmonary HTN  Renovascular hypertension  - Compensated CHF.    Could not get DIgital HTN to work for her.          US 11-19-21  Preserved right and left cortical thickness, echogenicity, and corticomedullary differentiation.  No right or left renal masses, stones, or hydronephrosis.  Right renal resistive index 0.83.  Left renal resistive index 0.82.  Right kidney measures 10.7 cm.  Left kidney measures 10.9 cm.  Given level of bladder distention with urine, no intraluminal masses. Unremarkable renal ultrasound.       CHF/HTN Regimen:   -     Irbesartan 300 mg daily        Nifedipine 60 mg daily  -     Hydralazine 50 mg BID.     Type 2 diabetes mellitus with stage 5 chronic kidney disease, without long-term current use of insulin  Diabetic Neuropathy and retinopathy  Diabetic neuropathy  - Diet controlled. A1c 5.5     Mixed hyperlipidemia  Statin Induced myopathy.  - LDL goal < 100    Last LDL 88 ()     - Statin induced myopathy with atorvastatin.      Start:  -     ezetimibe (ZETIA) 10 mg tablet; Take  1 tablet (10 mg total) by mouth once daily.  Dispense: 90 tablet; Refill: 3      Vitamin D deficiency  - Change from weekly to daily Vitamin D3 5000 IU daily OTC.   -     cholecalciferol, vitamin D3, 125 mcg (5,000 unit) Tab; Take 1 tablet (5,000 Units total) by mouth once daily.     Postoperative hypothyroidism    -     SYNTHROID 200 mcg tablet; Take 1 tablet (200 mcg total) by mouth before breakfast.      Generalized anxiety disorder  -     EScitalopram oxalate (LEXAPRO) 10 MG tablet nightly.  -     traZODone (DESYREL) 50 MG tablet; Take 1 tablet (50 mg total) by mouth nightly as needed for Insomnia.       Factor XI deficiency  - Usually get  FFP 2 units right before surgery for Factor XI deficiency     Class 2 severe obesity due to excess calories with serious comorbidity and body mass index (BMI) of 35.0 to 35.9 in adult  Body mass index is 35.42 kg/m².    Chronic allergic rhinitis  -     levocetirizine (XYZAL) 5 MG tablet; Take 1 tablet (5 mg total) by mouth daily as needed for Allergies.      Preventive Health Maintenance:     RSV and COVID booster vaccines recommended.     Return to Clinic for Follow Up with me:   6 months     Scheduled Follow-up :  No future appointments.      After Visit Medication List :     Medication List            Accurate as of February 2, 2024  3:13 PM. If you have any questions, ask your nurse or doctor.                START taking these medications      ezetimibe 10 mg tablet  Commonly known as: ZETIA  Take 1 tablet (10 mg total) by mouth once daily.  Started by: Elan Price MD            CONTINUE taking these medications      calcium acetate(phosphat bind) 667 mg capsule  Commonly known as: PHOSLO  Take 2 capsules by mouth 3 (three) times daily     cholecalciferol (vitamin D3) 125 mcg (5,000 unit) Tab  Take 1 tablet (5,000 Units total) by mouth once daily.     EScitalopram oxalate 10 MG tablet  Commonly known as: LEXAPRO  Take 1 tablet (10 mg total) by mouth every evening.      ferrous gluconate 324 MG tablet  Commonly known as: FERGON  Take 1 tablet (324 mg total) by mouth daily with breakfast.     hydrALAZINE 50 MG tablet  Commonly known as: APRESOLINE  Take 1 tablet (50 mg total) by mouth every 12 (twelve) hours.     irbesartan 300 MG tablet  Commonly known as: AVAPRO  Take 1 tablet by mouth daily     levocetirizine 5 MG tablet  Commonly known as: XYZAL  Take 1 tablet (5 mg total) by mouth daily as needed for Allergies.     NIFEdipine 60 MG Tbsr  Commonly known as: ADALAT CC  Take 1 tablet by mouth daily     SYNTHROID 200 MCG tablet  Generic drug: levothyroxine  Take 1 tablet (200 mcg total) by mouth before breakfast.     traZODone 50 MG tablet  Commonly known as: DESYREL  Take 1 to 2 tablets ( mg total) by mouth nightly as needed for Insomnia.               Where to Get Your Medications        These medications were sent to [facility]  [address] [place] [id number]      Hours: Mon-Fri, 8a-5:30p Phone: [phone]   EScitalopram oxalate 10 MG tablet  ezetimibe 10 mg tablet  levocetirizine 5 MG tablet  SYNTHROID 200 MCG tablet  traZODone 50 MG tablet         Signing Physician:  Elan Price MD

## 2024-02-02 NOTE — PATIENT INSTRUCTIONS
Recommend RSV vaccine and COVID booster - go to our immunization station / pharmacy in our clinic. No appointed needed Mon-Fri.

## 2024-02-14 ENCOUNTER — TELEPHONE (OUTPATIENT)
Dept: TRANSPLANT | Facility: CLINIC | Age: 64
End: 2024-02-14
Payer: MEDICARE

## 2024-02-22 ENCOUNTER — TELEPHONE (OUTPATIENT)
Dept: TRANSPLANT | Facility: CLINIC | Age: 64
End: 2024-02-22
Payer: MEDICARE

## 2024-03-13 ENCOUNTER — TELEPHONE (OUTPATIENT)
Dept: VASCULAR SURGERY | Facility: CLINIC | Age: 64
End: 2024-03-13
Payer: MEDICARE

## 2024-03-13 NOTE — TELEPHONE ENCOUNTER
Attempted to contact Joy but no answer.Left a voice message with a call back number [phone].  ----- Message from Otis Ridley sent at 3/13/2024  9:32 AM CDT -----  Regarding: Pt advice  Contact: [phone] Joy [facility] is calling to speak with the provider nurse to discuss clinical. Please call

## 2024-03-19 ENCOUNTER — TELEPHONE (OUTPATIENT)
Dept: VASCULAR SURGERY | Facility: CLINIC | Age: 64
End: 2024-03-19
Payer: MEDICARE

## 2024-03-19 NOTE — TELEPHONE ENCOUNTER
"Spoke with Parisa dialysis nurse in reference to message left on last week.Parisa verbalized " I was calling to discuss the pt's f/u appt and just to clarify if the f/u was correct". Informed her that the pt's f/u is 5-6 months but if she needs to be seen sooner she can contact the clinic.Parisa verbalized understanding of information received.  "

## 2024-04-01 DIAGNOSIS — T82.898D ARTERIOVENOUS FISTULA OCCLUSION, SUBSEQUENT ENCOUNTER: Primary | ICD-10-CM

## 2024-04-09 ENCOUNTER — TELEPHONE (OUTPATIENT)
Dept: TRANSPLANT | Facility: CLINIC | Age: 64
End: 2024-04-09
Payer: MEDICARE

## 2024-04-09 DIAGNOSIS — Z76.82 ORGAN TRANSPLANT CANDIDATE: Primary | ICD-10-CM

## 2024-04-09 NOTE — TELEPHONE ENCOUNTER
Confirmed with patient that surgery is scheduled for 5/13/24 with preop testing 5/2/24. All questions were answered and understanding verbalized.

## 2024-04-10 ENCOUNTER — TELEPHONE (OUTPATIENT)
Dept: TRANSPLANT | Facility: CLINIC | Age: 64
End: 2024-04-10
Payer: MEDICARE

## 2024-04-10 NOTE — TELEPHONE ENCOUNTER
PRE-SCHEDULING LD SURGERY REVIEW    Living donor surgery planned 5/13/24    Preop testing planned 5/2/24      Recipient:      Surgeon  Neph/GORDON  Stress test 11/28/22 PET ok  acceptable   2D Echo 2/6/23 PAP 58 ok  Recommend to repeat prior to surgery; HD started ~ 8/2023 so should be improved    RHC 6/15/23; cards cleared 1/17/24 ok acceptable   CXR 11/26/23 ok reviewed   Renal US 11/17/22  ok reviewed   Imaging PXR 8/31/23 ok  ok Defer to surgery    Iliacs 11/17/22 ok ok Defer to surgery   ABO  B ok  reviewed   Serologies HBcAB+, Quant 130  HCV+, Quant neg. ok  Reviewed. Cleared by hepatology. F0-1 on fibroscan   PTH 8/10/23 160.7 ok  acceptable   Lab 11/29/23 ok  Acceptable          Cancer Screenings UTD ok  Needs updated PAP   Repeat colonoscopy due 1/2025         Pt. Specific issues Factor XII def. Will need 2U FFP preop See comments in recip chart. Needs Factor XII level checked and additional FFP transfusions  Same as surgery comments. FFP prior to surgery. Consider heme in pt consult if needed       Donor:       Surgeon  Neph/GORDON  ABO compatible B ok  Acceptable          Serologies/Lab 3/30/24 ok  Acceptable          Imaging 3/20/24 - Left Multiple arteries, see comment on donor CT;   Defer to surgery

## 2024-04-11 RX ORDER — EPINEPHRINE 0.3 MG/.3ML
0.3 INJECTION SUBCUTANEOUS ONCE AS NEEDED
OUTPATIENT
Start: 2024-05-28

## 2024-04-11 RX ORDER — DIPHENHYDRAMINE HYDROCHLORIDE 50 MG/ML
50 INJECTION INTRAMUSCULAR; INTRAVENOUS ONCE AS NEEDED
OUTPATIENT
Start: 2024-05-28

## 2024-05-01 ENCOUNTER — TELEPHONE (OUTPATIENT)
Dept: PREADMISSION TESTING | Facility: HOSPITAL | Age: 64
End: 2024-05-01
Payer: MEDICARE

## 2024-05-01 DIAGNOSIS — Z76.82 ORGAN TRANSPLANT CANDIDATE: Primary | ICD-10-CM

## 2024-05-02 ENCOUNTER — OFFICE VISIT (OUTPATIENT)
Dept: TRANSPLANT | Facility: CLINIC | Age: 64
End: 2024-05-02
Payer: MEDICARE

## 2024-05-02 ENCOUNTER — HOSPITAL ENCOUNTER (OUTPATIENT)
Dept: CARDIOLOGY | Facility: CLINIC | Age: 64
Discharge: HOME | End: 2024-05-02
Payer: MEDICARE

## 2024-05-02 ENCOUNTER — SOCIAL WORK (OUTPATIENT)
Dept: TRANSPLANT | Facility: CLINIC | Age: 64
End: 2024-05-02
Payer: MEDICARE

## 2024-05-02 ENCOUNTER — HOSPITAL ENCOUNTER (OUTPATIENT)
Dept: RADIOLOGY | Facility: HOSPITAL | Age: 64
Discharge: HOME | End: 2024-05-02
Attending: INTERNAL MEDICINE
Payer: MEDICARE

## 2024-05-02 ENCOUNTER — CLINICAL SUPPORT (OUTPATIENT)
Dept: TRANSPLANT | Facility: CLINIC | Age: 64
End: 2024-05-02
Payer: MEDICARE

## 2024-05-02 ENCOUNTER — HOSPITAL ENCOUNTER (OUTPATIENT)
Dept: PREADMISSION TESTING | Facility: HOSPITAL | Age: 64
Discharge: HOME | End: 2024-05-02
Attending: TRANSPLANT SURGERY
Payer: MEDICARE

## 2024-05-02 ENCOUNTER — TELEPHONE (OUTPATIENT)
Dept: TRANSPLANT | Facility: CLINIC | Age: 64
End: 2024-05-02

## 2024-05-02 ENCOUNTER — ANESTHESIA EVENT (OUTPATIENT)
Dept: SURGERY | Facility: HOSPITAL | Age: 64
DRG: 652 | End: 2024-05-02
Payer: MEDICARE

## 2024-05-02 VITALS
SYSTOLIC BLOOD PRESSURE: 146 MMHG | WEIGHT: 239.63 LBS | BODY MASS INDEX: 35.49 KG/M2 | BODY MASS INDEX: 35.49 KG/M2 | DIASTOLIC BLOOD PRESSURE: 71 MMHG | DIASTOLIC BLOOD PRESSURE: 71 MMHG | RESPIRATION RATE: 18 BRPM | TEMPERATURE: 98 F | OXYGEN SATURATION: 99 % | RESPIRATION RATE: 18 BRPM | BODY MASS INDEX: 35.49 KG/M2 | WEIGHT: 239.63 LBS | HEIGHT: 69 IN | SYSTOLIC BLOOD PRESSURE: 146 MMHG | TEMPERATURE: 98 F | WEIGHT: 239.63 LBS | DIASTOLIC BLOOD PRESSURE: 71 MMHG | OXYGEN SATURATION: 99 % | WEIGHT: 239.63 LBS | SYSTOLIC BLOOD PRESSURE: 146 MMHG | OXYGEN SATURATION: 99 % | DIASTOLIC BLOOD PRESSURE: 71 MMHG | OXYGEN SATURATION: 99 % | RESPIRATION RATE: 18 BRPM | HEART RATE: 85 BPM | HEART RATE: 85 BPM | SYSTOLIC BLOOD PRESSURE: 146 MMHG | RESPIRATION RATE: 18 BRPM | TEMPERATURE: 98 F | HEART RATE: 85 BPM | HEART RATE: 85 BPM | HEIGHT: 69 IN | BODY MASS INDEX: 35.49 KG/M2 | HEIGHT: 69 IN | HEIGHT: 69 IN | TEMPERATURE: 98 F

## 2024-05-02 VITALS
TEMPERATURE: 98 F | DIASTOLIC BLOOD PRESSURE: 71 MMHG | SYSTOLIC BLOOD PRESSURE: 138 MMHG | BODY MASS INDEX: 35.28 KG/M2 | OXYGEN SATURATION: 96 % | HEIGHT: 69 IN | WEIGHT: 238.19 LBS | HEART RATE: 85 BPM

## 2024-05-02 DIAGNOSIS — Z99.2 ESRD (END STAGE RENAL DISEASE) ON DIALYSIS: ICD-10-CM

## 2024-05-02 DIAGNOSIS — Z76.82 ORGAN TRANSPLANT CANDIDATE: Primary | ICD-10-CM

## 2024-05-02 DIAGNOSIS — Z01.818 PRE-TRANSPLANT EVALUATION FOR KIDNEY TRANSPLANT: ICD-10-CM

## 2024-05-02 DIAGNOSIS — N18.6 ESRD (END STAGE RENAL DISEASE) ON DIALYSIS: ICD-10-CM

## 2024-05-02 DIAGNOSIS — D68.2 FACTOR XII DEFICIENCY: ICD-10-CM

## 2024-05-02 DIAGNOSIS — N18.6 ESRD (END STAGE RENAL DISEASE): Primary | ICD-10-CM

## 2024-05-02 DIAGNOSIS — Z76.82 ORGAN TRANSPLANT CANDIDATE: ICD-10-CM

## 2024-05-02 LAB
OHS QRS DURATION: 88 MS
OHS QTC CALCULATION: 434 MS

## 2024-05-02 PROCEDURE — 93005 ELECTROCARDIOGRAM TRACING: CPT | Mod: S$GLB,TXP,, | Performed by: INTERNAL MEDICINE

## 2024-05-02 PROCEDURE — 3066F NEPHROPATHY DOC TX: CPT | Mod: CPTII,S$GLB,TXP, | Performed by: INTERNAL MEDICINE

## 2024-05-02 PROCEDURE — 71046 X-RAY EXAM CHEST 2 VIEWS: CPT | Mod: 26,TXP,, | Performed by: RADIOLOGY

## 2024-05-02 PROCEDURE — 71046 X-RAY EXAM CHEST 2 VIEWS: CPT | Mod: TC,HCNC,TXP

## 2024-05-02 PROCEDURE — 99999 PR PBB SHADOW E&M-EST. PATIENT-LVL III: CPT | Mod: PBBFAC,HCNC,TXP,

## 2024-05-02 PROCEDURE — 3008F BODY MASS INDEX DOCD: CPT | Mod: CPTII,S$GLB,TXP, | Performed by: INTERNAL MEDICINE

## 2024-05-02 PROCEDURE — 99215 OFFICE O/P EST HI 40 MIN: CPT | Mod: S$GLB,TXP,, | Performed by: INTERNAL MEDICINE

## 2024-05-02 PROCEDURE — 93010 ELECTROCARDIOGRAM REPORT: CPT | Mod: S$GLB,TXP,, | Performed by: INTERNAL MEDICINE

## 2024-05-02 PROCEDURE — 3077F SYST BP >= 140 MM HG: CPT | Mod: CPTII,S$GLB,TXP, | Performed by: INTERNAL MEDICINE

## 2024-05-02 PROCEDURE — 4010F ACE/ARB THERAPY RXD/TAKEN: CPT | Mod: CPTII,S$GLB,TXP, | Performed by: INTERNAL MEDICINE

## 2024-05-02 PROCEDURE — 3078F DIAST BP <80 MM HG: CPT | Mod: CPTII,S$GLB,TXP, | Performed by: INTERNAL MEDICINE

## 2024-05-02 PROCEDURE — 99499 UNLISTED E&M SERVICE: CPT | Mod: S$GLB,TXP,, | Performed by: SURGERY

## 2024-05-02 PROCEDURE — 99999 PR PBB SHADOW E&M-EST. PATIENT-LVL III: CPT | Mod: PBBFAC,HCNC,TXP, | Performed by: INTERNAL MEDICINE

## 2024-05-02 RX ORDER — HEPARIN SODIUM 5000 [USP'U]/ML
5000 INJECTION, SOLUTION INTRAVENOUS; SUBCUTANEOUS ONCE
Status: CANCELLED | OUTPATIENT
Start: 2024-05-02 | End: 2024-05-02

## 2024-05-02 RX ORDER — ACETAMINOPHEN 650 MG/20.3ML
650 LIQUID ORAL ONCE
Status: CANCELLED | OUTPATIENT
Start: 2024-05-02 | End: 2024-05-02

## 2024-05-02 RX ORDER — MUPIROCIN 20 MG/G
OINTMENT TOPICAL
Status: CANCELLED | OUTPATIENT
Start: 2024-05-02

## 2024-05-02 RX ORDER — VANCOMYCIN HCL IN 5 % DEXTROSE 1.5G/250ML
1500 PLASTIC BAG, INJECTION (ML) INTRAVENOUS
Status: CANCELLED | OUTPATIENT
Start: 2024-05-02

## 2024-05-02 RX ORDER — CIPROFLOXACIN 2 MG/ML
400 INJECTION, SOLUTION INTRAVENOUS
Status: CANCELLED | OUTPATIENT
Start: 2024-05-02

## 2024-05-02 RX ORDER — DIPHENHYDRAMINE HYDROCHLORIDE 50 MG/ML
50 INJECTION INTRAMUSCULAR; INTRAVENOUS ONCE
Status: CANCELLED | OUTPATIENT
Start: 2024-05-02 | End: 2024-05-02

## 2024-05-02 RX ORDER — SODIUM CHLORIDE 0.9 % (FLUSH) 0.9 %
10 SYRINGE (ML) INJECTION
Status: CANCELLED | OUTPATIENT
Start: 2024-05-02

## 2024-05-02 NOTE — TELEPHONE ENCOUNTER
Patient notified of need for hematology follow up due to blood disorder.    Patient seen in office today for Depo injection. CLINIC supplied MEDROXYPROGESTERONE 150 mg/ml administered into patients LEFT GLUTEAL REGION. Patient observed after injection. No complaints or side effects noted. Patient given Depo calender. Patient to [facility]  02--03-. Patient verbalized understanding. Patient discharged home.

## 2024-05-02 NOTE — H&P (VIEW-ONLY)
Transplant Surgery  Kidney Transplant Recipient Evaluation     Referring Physician: Fred Kelly  Current Nephrologist: Fred Kelly     Subjective:      Reason for Visit: evaluate transplant candidacy     History of Present Illness: Kathie Quezada is a 64 y.o. year old female undergoing transplant evaluation.     Dialysis History: Kathie is on hemodialysis.       Transplant History: N/A     Etiology of Renal Disease: Diabetes Mellitus - Type II (based on medical records from referral).     External provider notes reviewed: No     Review of Systems   Constitutional:  Positive for fatigue.   HENT:  Negative for drooling, postnasal drip and sore throat.    Eyes:  Negative for discharge and itching.   Respiratory:  Negative for choking and stridor.    Gastrointestinal:  Negative for rectal pain.   Endocrine: Negative for polydipsia.   Genitourinary:  Negative for enuresis and genital sores.   Musculoskeletal:  Negative for back pain, neck pain and neck stiffness.   Allergic/Immunologic: Negative for immunocompromised state.   Neurological:  Negative for facial asymmetry and numbness.   Hematological:  Negative for adenopathy.   Psychiatric/Behavioral:  Negative for behavioral problems, self-injury and suicidal ideas.    Objective:      Physical Exam:  Constitutional:              Vitals reviewed: yes              Well-nourished and well-groomed: yes  Eyes:              Sclerae icteric: no              Extraocular movements intact: yes  GI:                     Bowel sounds normal: yes              Tenderness: no                          If yes, quadrant/location: not applicable              Palpable masses: no                          If yes, quadrant/location: not applicable              Hepatosplenomegaly: no              Ascites: no              Hernia: no                          If yes, type/location: not applicable              Surgical scars: yes                          If yes, type/location:  midline  not applicable  Resp:              Effort normal: yes              Breath sounds normal: yes     CV:              Regular rate and rhythm: yes              Heart sounds normal: yes              Femoral pulses normal: yes              Extremities edematous: no  Skin:              Rashes or lesions present: no                          If yes, describe:not applicable              Jaundice:: no     Musculoskeletal:              Gait normal: yes              Strength normal: yes  Psych:              Oriented to person, place, and time: yes              Affect and mood normal: yes     Additional comments: not applicable     Diagnostics:  The following labs have been reviewed: CBC  CMP     Counseling: We provided Kathie Quezada with a group education session today.  We discussed kidney transplantation at length with her, including risks, potential complications, and alternatives in the management of her renal failure.  The discussion included complications related to anesthesia, bleeding, infection, primary nonfunction, and ATN.  I discussed the typical postoperative course, length of hospitalization, the need for long-term immunosuppression, and the need for long-term routine follow-up.  I discussed living-donor and -donor transplantation and the relative advantages and disadvantages of each.  I also discussed average waiting times for both living donation and  donation.  I discussed national and center-specific survival rates.  I also mentioned the potential benefit of multicenter listing to candidates listed with centers within more than one organ procurement organization.  All questions were answered.     Patient advised that it is recommended that all transplant candidates, and their close contacts and household members receive Covid vaccination.     Final determination of transplant candidacy will be made once evaluation is complete and reviewed by the Kidney & Kidney/Pancreas  Selection Committee.     Coronavirus disease (COVID-19) caused by severe acute respiratory virus coronavirus 2 (SARS-C0V 2) is associated with increased mortality in solid organ transplant recipients (SOT) compared to non-transplant patients. Vaccine responses to vaccination are depressed against SARS-CoV2 compared to normal individuals but improve with third vaccination doses. Vaccination prior to SOT provides both the best opportunity for transplant candidates to develop protective immunity and to reduce the risk of serious COVID19 infections post transplantation. Organ transplant candidates at [facility] will be required to receive SARS-CoV-2 vaccination prior to being listed with a an active status, whenever possible. Exceptions will be made for disability related reasons or for sincerely held L[employer] beliefs.         TxpSurgAssessPlan  Transplant Surgery - Candidacy   Assessment/Plan:   Kathie Quezada has end stage renal disease (ESRD) on dialysis. I see no surgical contraindication to placing a kidney transplant. Based on available information, Kathie Quezada is a suitable kidney transplant candidate.   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     Additional testing to be completed according to the Written Order Guidelines for Adult Pre-kidney and Pancreas Transplant Evaluation (KI-02).  Interpretation of tests and discussion of patient management involves all members of the multidisciplinary transplant team.     Stepan Bernardo MD

## 2024-05-02 NOTE — PROGRESS NOTES
Transplant Surgery  Kidney Transplant Recipient Evaluation    Referring Physician: Fred Kelly  Current Nephrologist: Fred Kelly    Subjective:     Reason for Visit: evaluate transplant candidacy    History of Present Illness: Kathie Quezada is a 64 y.o. year old female undergoing transplant evaluation.    Dialysis History: Kathie is on hemodialysis.      Transplant History: N/A    Etiology of Renal Disease: Diabetes Mellitus - Type II (based on medical records from referral).    External provider notes reviewed: No    Review of Systems   Constitutional:  Positive for fatigue.   HENT:  Negative for drooling, postnasal drip and sore throat.    Eyes:  Negative for discharge and itching.   Respiratory:  Negative for choking and stridor.    Gastrointestinal:  Negative for rectal pain.   Endocrine: Negative for polydipsia.   Genitourinary:  Negative for enuresis and genital sores.   Musculoskeletal:  Negative for back pain, neck pain and neck stiffness.   Allergic/Immunologic: Negative for immunocompromised state.   Neurological:  Negative for facial asymmetry and numbness.   Hematological:  Negative for adenopathy.   Psychiatric/Behavioral:  Negative for behavioral problems, self-injury and suicidal ideas.    Objective:     Physical Exam:  Constitutional:   Vitals reviewed: yes   Well-nourished and well-groomed: yes  Eyes:   Sclerae icteric: no   Extraocular movements intact: yes  GI:    Bowel sounds normal: yes   Tenderness: no    If yes, quadrant/location: not applicable   Palpable masses: no    If yes, quadrant/location: not applicable   Hepatosplenomegaly: no   Ascites: no   Hernia: no    If yes, type/location: not applicable   Surgical scars: yes    If yes, type/location: midline  not applicable  Resp:   Effort normal: yes   Breath sounds normal: yes    CV:   Regular rate and rhythm: yes   Heart sounds normal: yes   Femoral pulses normal: yes   Extremities edematous: no  Skin:   Rashes or lesions  present: no    If yes, describe:not applicable   Jaundice:: no    Musculoskeletal:   Gait normal: yes   Strength normal: yes  Psych:   Oriented to person, place, and time: yes   Affect and mood normal: yes    Additional comments: not applicable    Diagnostics:  The following labs have been reviewed: CBC  CMP    Counseling: We provided Kathie Quezada with a group education session today.  We discussed kidney transplantation at length with her, including risks, potential complications, and alternatives in the management of her renal failure.  The discussion included complications related to anesthesia, bleeding, infection, primary nonfunction, and ATN.  I discussed the typical postoperative course, length of hospitalization, the need for long-term immunosuppression, and the need for long-term routine follow-up.  I discussed living-donor and -donor transplantation and the relative advantages and disadvantages of each.  I also discussed average waiting times for both living donation and  donation.  I discussed national and center-specific survival rates.  I also mentioned the potential benefit of multicenter listing to candidates listed with centers within more than one organ procurement organization.  All questions were answered.    Patient advised that it is recommended that all transplant candidates, and their close contacts and household members receive Covid vaccination.    Final determination of transplant candidacy will be made once evaluation is complete and reviewed by the Kidney & Kidney/Pancreas Selection Committee.    Coronavirus disease (COVID-19) caused by severe acute respiratory virus coronavirus 2 (SARS-C0V 2) is associated with increased mortality in solid organ transplant recipients (SOT) compared to non-transplant patients. Vaccine responses to vaccination are depressed against SARS-CoV2 compared to normal individuals but improve with third vaccination doses. Vaccination  prior to SOT provides both the best opportunity for transplant candidates to develop protective immunity and to reduce the risk of serious COVID19 infections post transplantation. Organ transplant candidates at [facility] will be required to receive SARS-CoV-2 vaccination prior to being listed with a an active status, whenever possible. Exceptions will be made for disability related reasons or for sincerely held Church beliefs.          Transplant Surgery - Candidacy   Assessment/Plan:   Kathie Quezada has end stage renal disease (ESRD) on dialysis. I see no surgical contraindication to placing a kidney transplant. Based on available information, Kahtie Quezada is a suitable kidney transplant candidate.   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    Additional testing to be completed according to the Written Order Guidelines for Adult Pre-kidney and Pancreas Transplant Evaluation (KI-02).  Interpretation of tests and discussion of patient management involves all members of the multidisciplinary transplant team.    Stepan Bernardo MD

## 2024-05-02 NOTE — ANESTHESIA PREPROCEDURE EVALUATION
Pre-operative evaluation for Procedure(s) (LRB):  TRANSPLANT, KIDNEY (N/A)    Kathie Quezada is a 64 y.o. 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 Previous airway benign.   RHC 1 year ago- PA pressures 40/18 (mild pulm HTN)  ECHO- normal LVEF, DD present, Est PASP 58 (months prior to RHC)  Cardiac Pet Stress- end of 2022- negative for ischemia    ECHO (if on file):  Results for orders placed during the hospital encounter of 02/01/23    Echo    Interpretation Summary  · Moderate left atrial enlargement.  · The left ventricle is normal in size with concentric hypertrophy and normal systolic function.  · Left ventricular diastolic dysfunction.  · Normal right ventricular size with normal right ventricular systolic function.  · Mild mitral regurgitation.  · Moderate tricuspid regurgitation.  · Intermediate central venous pressure (8 mmHg).  · There is pulmonary hypertension. The estimated PA systolic pressure is 58 mmHg.  · Small lateral pericardial effusion.  · The estimated PA systolic pressure is 58 mmHg.      Patient Active Problem List   Diagnosis    Postoperative hypothyroidism    Renovascular hypertension    Mixed hyperlipidemia    Nephrotic range proteinuria    History of hepatitis C    Chronic diastolic heart failure    Factor XI deficiency    Vitamin D deficiency    S/P arteriovenous (AV) fistula creation    Generalized anxiety disorder    Class 2 severe obesity due to excess calories with serious comorbidity and body mass index (BMI) of 35.0 to 35.9 in adult    Diabetic polyneuropathy associated with type 2 diabetes mellitus    Controlled type 2  diabetes mellitus with stable proliferative retinopathy of both eyes, unspecified whether long term insulin use    Type 2 diabetes mellitus with end-stage renal disease    Myalgia due to statin    ESRD on hemodialysis    Osteopenia of multiple sites    Chronic allergic rhinitis    Iron deficiency anemia    Metabolic bone disease    Pulmonary hypertension    Organ transplant candidate    Anemia in stage 5 chronic kidney disease, not on chronic dialysis    Pre-transplant evaluation for kidney transplant    Secondary hyperparathyroidism of renal origin       Review of patient's allergies indicates:   Allergen Reactions    Atorvastatin Other (See Comments)     Statin induced myalgia    Mushroom Hives    Peanut Anaphylaxis    Bactrim [sulfamethoxazole-trimethoprim] Hives    Gabapentin      Pruritis     Iodine     Peanut butter flavor     Penicillins Hives    Shellfish containing products Itching    Sulfa (sulfonamide antibiotics) Hives       No current facility-administered medications on file prior to encounter.     Current Outpatient Medications on File Prior to Encounter   Medication Sig Dispense Refill    calcium acetate,phosphat bind, (PHOSLO) 667 mg capsule Take 2 capsules by mouth 3 (three) times daily 180 capsule 11    cholecalciferol, vitamin D3, 125 mcg (5,000 unit) Tab Take 1 tablet (5,000 Units total) by mouth once daily. 90 tablet 3    EScitalopram oxalate (LEXAPRO) 10 MG tablet Take 1 tablet (10 mg total) by mouth every evening. 90 tablet 3    ezetimibe (ZETIA) 10 mg tablet Take 1 tablet (10 mg total) by mouth once daily. 90 tablet 3    ferrous gluconate (FERGON) 324 MG tablet Take 1 tablet (324 mg total) by mouth daily with breakfast. 90 tablet 1    irbesartan (AVAPRO) 300 MG tablet Take 1 tablet by mouth daily 30 tablet 11    levocetirizine (XYZAL) 5 MG tablet Take 1 tablet (5 mg total) by mouth daily as needed for Allergies. 90 tablet 3    NIFEdipine (ADALAT CC) 60 MG TbSR Take 1 tablet by mouth daily 30  tablet 11    SYNTHROID 200 mcg tablet Take 1 tablet (200 mcg total) by mouth before breakfast. 90 tablet 3    traZODone (DESYREL) 50 MG tablet Take 1 to 2 tablets ( mg total) by mouth nightly as needed for Insomnia. 120 tablet 3       Past Surgical History:   Procedure Laterality Date    AV FISTULA PLACEMENT Left 10/27/2022    Procedure: CREATION, AV FISTULA;  Surgeon: Tirso Tavera MD;  Location: [facility] OR [address] [place] [address];  Service: Peripheral Vascular;  Laterality: Left;  Brachio basilic 1st stage     BACK SURGERY  2017    CHOLECYSTECTOMY  2015    [place]    COLONOSCOPY  11/13/2015    Dr Chino torres prep. internal hemorrhoids. diverticulosis of sigmois. NO polyps. repeat due 2025    FISTULOGRAM N/A 12/16/2022    Procedure: Fistulogram;  Surgeon: Tirso Tavera MD;  Location: [facility] CATH LAB;  Service: Peripheral Vascular;  Laterality: N/A;    FISTULOGRAM, WITH PTA Left 3/23/2023    Procedure: FISTULOGRAM, WITH PTA;  Surgeon: Tirso Tavera MD;  [facility];  Service: Peripheral Vascular;  Laterality: Left;  LUE TRANSRADIAL CO2 FISTULOGRAM    1.9 min  22.99 mGy  4.3032 Gy.cm  200cc CO2  9ml TransRadial Solution    FISTULOGRAM, WITH PTA Left 8/15/2023    Procedure: FISTULOGRAM, WITH PTA TRANSRADIAL;  Surgeon: Tirso Tavera MD;  Location: [facility] OR [address] [place] [address];  Service: Peripheral Vascular;  Laterality: Left;  Fluoro: 5.1 min, mGy: 7.95, Gycm2: 1.2626, contrast: 12ml    FIXATION KYPHOPLASTY Bilateral 08/14/2019    Procedure: L2 kyphoplasty Fluoro Globus;  Surgeon: Jeremie Kwon DO;  Location: [facility] OR;  Service: Neurosurgery;  Laterality: Bilateral;  CROW [place] [phone] TEXTED HER @ 10:54AM ON 7-30-19  PRE-OP-BY RN  8-7-2019    HYSTERECTOMY      OOPHORECTOMY  1996    one    PERCUTANEOUS TRANSLUMINAL ANGIOPLASTY OF ARTERIOVENOUS FISTULA Left 12/16/2022    Procedure: PTA, AV FISTULA;  Surgeon: Tirso Tavera MD;  Location: [facility] CATH LAB;  Service: Peripheral Vascular;  Laterality: Left;     PERCUTANEOUS TRANSLUMINAL ANGIOPLASTY OF ARTERIOVENOUS FISTULA Left 11/30/2023    Procedure: PTA, AV FISTULA;  Surgeon: Tirso Tavera MD;  Location: [facility] CATH LAB;  Service: Peripheral Vascular;  Laterality: Left;  left transradial fistulagram    RIGHT HEART CATHETERIZATION Right 6/15/2023    Procedure: INSERTION, CATHETER, RIGHT HEART;  Surgeon: Lucretia Rivas MD;  Location: [facility] CATH LAB;  Service: Cardiology;  Laterality: Right;    THYROIDECTOMY  2016    at [facility] for thyroid nodule    TONSILLECTOMY      TRANSPOSITION OF BASILIC VEIN Left 1/31/2023    Procedure: TRANSPOSITION, VEIN, BASILIC;  Surgeon: Tirso Tavera MD;  Location: [facility] OR [facility] FLR;  Service: Peripheral Vascular;  Laterality: Left;       Social History     Socioeconomic History    Marital status:    Tobacco Use    Smoking status: Never     Passive exposure: Past    Smokeless tobacco: Never   Substance and Sexual Activity    Alcohol use: Not Currently    Drug use: No    Sexual activity: Not Currently   Social History Narrative    Pt enjoys cooking and baking and making Floral arrangement and Ker[employer]     Caregiver Daughter Mike     Social Determinants of Health     Financial Resource Strain: Low Risk  (1/24/2024)    Overall Financial Resource Strain (CARDIA)     Difficulty of Paying Living Expenses: Not very hard   Food Insecurity: Food Insecurity Present (1/24/2024)    Hunger Vital Sign     Worried About Running Out of Food in the Last Year: Sometimes true     Ran Out of Food in the Last Year: Never true   Transportation Needs: No Transportation Needs (1/24/2024)    PRAPARE - Transportation     Lack of Transportation (Medical): No     Lack of Transportation (Non-Medical): No   Physical Activity: Insufficiently Active (1/24/2024)    Exercise Vital Sign     Days of Exercise per Week: 3 days     Minutes of Exercise per Session: 20 min   Stress: No Stress Concern Present (1/24/2024)    [employer] -  "Occupational Stress Questionnaire     Feeling of Stress : Not at all   Housing Stability: Low Risk  (2024)    Housing Stability Vital Sign     Unable to Pay for Housing in the Last Year: No     Number of Places Lived in the Last Year: 1     Unstable Housing in the Last Year: No         CBC: No results for input(s): "WBC", "RBC", "HGB", "HCT", "PLT", "MCV", "MCH", "MCHC" in the last 72 hours.    CMP: No results for input(s): "NA", "K", "CL", "CO2", "BUN", "CREATININE", "GLU", "MG", "PHOS", "CALCIUM", "ALBUMIN", "PROT", "ALKPHOS", "ALT", "AST", "BILITOT" in the last 72 hours.    INR  No results for input(s): "PT", "INR", "PROTIME", "APTT" in the last 72 hours.        Diagnostic Studies:      EKD Echo:  No results found. However, due to the size of the patient record, not all encounters were searched. Please check Results Review for a complete set of results.        Pre-op Assessment     I have reviewed the Nursing Notes.       Review of Systems  Anesthesia Hx:  No problems with previous Anesthesia                Social:  Non-Smoker, No Alcohol Use       Hematology/Oncology:       -- Anemia:               Hematology Comments: Factor XI deficiency                    EENT/Dental:  chronic allergic rhinitis           Cardiovascular:  Exercise tolerance: good   Denies Pacemaker. Hypertension Valvular problems/Murmurs (Mild MR), MR      CHF (one clinical episode in )    hyperlipidemia   ECG has been reviewed. 2023 TTE reviewed                         Pulmonary:  Pulmonary Normal                       Renal/:  Chronic Renal Disease (Currently on //Sat schedule), ESRD, CKD                Hepatic/GI:  No Bowel Prep. Denies PUD.  GERD, well controlled Liver Disease, Hepatitis, C           Neurological:   CVA (Patient states around ), no residual symptoms    Denies Seizures.          Peripheral Neuropathy                          Endocrine:  Diabetes, type 2 Hypothyroidism        Obesity / BMI " > 30  Psych:  Psychiatric History anxiety                 Physical Exam  General: Well nourished, Cooperative and Alert    Airway:  Mallampati: I   Mouth Opening: Normal  TM Distance: Normal  Tongue: Normal  Neck ROM: Normal ROM    Dental:  Partial Dentures, Dentures    Chest/Lungs:  Normal Respiratory Rate    Heart:  Rate: Normal        Anesthesia Plan  Type of Anesthesia, risks & benefits discussed:    Anesthesia Type: Gen ETT  Intra-op Monitoring Plan: Standard ASA Monitors and Art Line  Post Op Pain Control Plan: multimodal analgesia and IV/PO Opioids PRN  Induction:  IV  Airway Plan: Direct, Post-Induction  Informed Consent: Informed consent signed with the Patient and all parties understand the risks and agree with anesthesia plan.  All questions answered.   ASA Score: 3  Day of Surgery Review of History & Physical: H&P Update referred to the surgeon/provider.  Anesthesia Plan Notes: GA,ETT, arterial line, possible CVL discussed. R/B/A explained. All questions answered. E-consent obtained in preop clinic.    Ready For Surgery From Anesthesia Perspective.     .

## 2024-05-02 NOTE — PROGRESS NOTES
Kidney Transplant Recipient Preoperative Evaluation    Subjective:     CC:  Preoperative evaluation of kidney transplant candidacy.    HPI:  Ms. Quezada is a 64 y.o. year old Black or  female who has been approved to receive a living related kidney transplant.  ESRD presumed to be secondary to DM who started HD via R IJ on 2023. Currently also has a L AVF. Tolerating HD well. Denies any hypotension. Makes less than 250 ml of urine on most days    - DM and HTN diagnosed since .     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 Father may have had it. Seen by hematology with factor assays run. Noted on those to have factor XII deficiency which would predispose patient to clots. However patient clinically in the past with bleeding after surgeries which required FFP. Finally recommendations from heme acknowledges the discrepancy but states the use of FFP during and in post op period.     - Hx of hep C s/p treatment with harvoni. Seen by hepatology with fibroscan performed showing F0-F1    - Patient also noted to have pHTN. RHC performed on 23 and found to have group 2 HTN. TTE and cardiac PET with preserved EF.     PSH: cholecystectomy, tonsillectomy, hysterectomy, AVF surgery, thyroidectomy    Allergies: see list but allergic to Lipitor and sulfa    Social: denies any smoking, ETOH or drugs.  with 5 kids. Daughter to be the care giver    FH: no hx of kidney dx; father with prostate cancer; mother  of MI in her 60's       Review of Systems   Constitutional:  Negative for activity change, appetite change, chills and fever.        Fluid weight loss since starting HD    HENT:  Positive for congestion and rhinorrhea. Negative for sneezing and sore throat.    Eyes:  Negative for pain and itching.   Respiratory:  Negative for apnea, cough, shortness of breath and wheezing.    Cardiovascular:  Negative  "for chest pain.   Gastrointestinal:  Positive for constipation. Negative for abdominal pain, diarrhea, nausea and vomiting.   Genitourinary:  Negative for difficulty urinating, dysuria and frequency.   Musculoskeletal:  Negative for back pain, joint swelling and neck pain.   Skin:  Negative for color change.   Neurological:  Negative for dizziness, light-headedness and headaches.   Psychiatric/Behavioral:  Negative for behavioral problems and confusion. The patient is not nervous/anxious.        Objective:     Blood pressure (!) 146/71, pulse 85, temperature 97.7 °F (36.5 °C), temperature source Temporal, resp. rate 18, height 5' 9" (1.753 m), weight 108.7 kg (239 lb 10.2 oz), SpO2 99%.  Physical Exam  Vitals reviewed.   Constitutional:       General: She is not in acute distress.     Appearance: Normal appearance. She is obese. She is not ill-appearing or toxic-appearing.      Comments: Appears stated age   HENT:      Head: Normocephalic and atraumatic.      Right Ear: External ear normal.      Left Ear: External ear normal.      Nose: Nose normal.   Eyes:      General: No scleral icterus.     Conjunctiva/sclera: Conjunctivae normal.   Cardiovascular:      Rate and Rhythm: Normal rate and regular rhythm.      Pulses: Normal pulses.      Heart sounds: Normal heart sounds. No murmur heard.     No friction rub.   Pulmonary:      Effort: Pulmonary effort is normal. No respiratory distress.      Breath sounds: Normal breath sounds. No wheezing or rhonchi.   Abdominal:      General: Bowel sounds are normal. There is no distension.      Palpations: Abdomen is soft.      Tenderness: There is no abdominal tenderness. There is no guarding.   Musculoskeletal:         General: No swelling or deformity. Normal range of motion.      Cervical back: Normal range of motion and neck supple. No tenderness.      Right lower leg: No edema.      Left lower leg: No edema.   Skin:     General: Skin is warm and dry.      Coloration: Skin " is not jaundiced.      Findings: No erythema or rash.   Neurological:      General: No focal deficit present.      Mental Status: She is alert and oriented to person, place, and time.      Motor: No weakness.   Psychiatric:         Mood and Affect: Mood normal.         Behavior: Behavior normal.       Labs:  5/2/2024: Prothrombin Time 11.5 sec (Ref range: 9.0 - 12.5 sec)  Labs were reviewed with the patient.    ABO type: B NEG    Assessment:   64 yr old with ESRD secondary to DM who is being evaluated for upcoming living related (daughter) kidney transplant scheduled for May 13, 2023. Patient seen today in pre-op    1) ESRD    2) Factor XII deficiency    3) type II DM    4) hx of CVA    5) hx of hep (C treated)    6) Group 2 pHTN    Transplant Candidacy:   Based on available information, Ms. Quezada remains a suitable kidney transplant candidate if we can clarify with hematology regarding her underlying factor deficiency   - patient clinically with bleeding after prior surgeries requiring FFP. Patient seen by hematology however and found to have a factor XII deficiency which normally would present more with an underlying clotting disorder. Would recommend that patient be followed by hematology prior to the planned surgery with clarification regarding underlying disorder as well as recommendations on how to proceed at the time of surgery as well as afterwards.   - Also reviewed imaging and routine transplant work up. Labs reviewed in clinic with patient     Leticia Lares DO   Transplant Nephrology      Follow-up:  After the transplant, the patient will follow-up with the kidney transplant team and get blood work per standard protocol as described below.    Clinic: return to transplant clinic weekly for the first month after transplant; every 2 weeks during months 2-3; then at 6-, 9-, 12-, 18-, 24-, and 36- months post-transplant to reassess for complications from immunosuppression and monitor for rejection.  Annually  thereafter.    Labs: continue twice weekly CBC, renal panel, and drug level for first month; then same labs once weekly through 3rd month post-transplant.  Urine for UA, protein/creatinine ratio monthly.  Add urine BK - PCR at 1-, 3-, 6-, 9-, 12-, 18-, 24-, and 36- months post-transplant.  Hepatic panel at 1-, 2-, 3-, 6-, 9-, 12-, 18-, 24-, and 36- months post-transplant.

## 2024-05-02 NOTE — LETTER
May 2, 2024        Fred Kelly  [address]  [facility] [address]  Phone: [phone]  Fax: [phone]             [facility]  [address]  [facility] [address]  Phone: [phone]   Patient: Kathie Quezada   MR Number: [id number]   YOB: 1960   Date of Visit: 5/2/2024       Dear Dr. Fred Kelly    Thank you for referring Kathie Quezada to me for evaluation. Attached you will find relevant portions of my assessment and plan of care.    If you have questions, please do not hesitate to call me. I look forward to following Kathie Quezada along with you.    Sincerely,    Leticia Lares, DO    Enclosure    If you would like to receive this communication electronically, please contact [email or web address] or [phone] to request [employer] access.    [employer] is a tool which provides read-only access to select patient information with whom you have a relationship. Its easy to use and provides real time access to review your patients record including encounter summaries, notes, results, and demographic information.    If you feel you have received this communication in error or would no longer like to receive these types of communications, please e-mail [email or web address]

## 2024-05-02 NOTE — PROGRESS NOTES
Clinic Note: Pre-op Transplant Note    Met with Kathie Quezada in the clinic as part of her pre-transplant clearance appointment.    1) Performed a complete medication reconciliation.    2) Obtained copies of insurance cards, patient understood that the Pharm.D. will order medications, and that medications must be available prior to discharge   3) Discussed medication education that will occur post-transplant   4) Patient will (will/will not) use ORx for first fill.  5)  was checked: Yes  6) Co-pays were assessed: Yes    Current Outpatient Medications   Medication Sig Dispense Refill    calcium acetate,phosphat bind, (PHOSLO) 667 mg capsule Take 2 capsules by mouth 3 (three) times daily 180 capsule 11    cholecalciferol, vitamin D3, 125 mcg (5,000 unit) Tab Take 1 tablet (5,000 Units total) by mouth once daily. 90 tablet 3    EScitalopram oxalate (LEXAPRO) 10 MG tablet Take 1 tablet (10 mg total) by mouth every evening. 90 tablet 3    ezetimibe (ZETIA) 10 mg tablet Take 1 tablet (10 mg total) by mouth once daily. 90 tablet 3    ferrous gluconate (FERGON) 324 MG tablet Take 1 tablet (324 mg total) by mouth daily with breakfast. 90 tablet 1    hydrALAZINE (APRESOLINE) 50 MG tablet Take 1 tablet (50 mg total) by mouth every 12 (twelve) hours. 180 tablet 1    irbesartan (AVAPRO) 300 MG tablet Take 1 tablet by mouth daily 30 tablet 11    levocetirizine (XYZAL) 5 MG tablet Take 1 tablet (5 mg total) by mouth daily as needed for Allergies. 90 tablet 3    NIFEdipine (ADALAT CC) 60 MG TbSR Take 1 tablet by mouth daily 30 tablet 11    SYNTHROID 200 mcg tablet Take 1 tablet (200 mcg total) by mouth before breakfast. 90 tablet 3    traZODone (DESYREL) 50 MG tablet Take 1 to 2 tablets ( mg total) by mouth nightly as needed for Insomnia. 120 tablet 3     No current facility-administered medications for this visit.       Patient verbalized understanding and had the opportunity to ask questions

## 2024-05-02 NOTE — H&P
Transplant Surgery  Kidney Transplant Recipient Evaluation     Referring Physician: Fred Kelly  Current Nephrologist: Fred Kelly     Subjective:      Reason for Visit: evaluate transplant candidacy     History of Present Illness: Kathie Quezada is a 64 y.o. year old female undergoing transplant evaluation.     Dialysis History: Kathie is on hemodialysis.       Transplant History: N/A     Etiology of Renal Disease: Diabetes Mellitus - Type II (based on medical records from referral).     External provider notes reviewed: No     Review of Systems   Constitutional:  Positive for fatigue.   HENT:  Negative for drooling, postnasal drip and sore throat.    Eyes:  Negative for discharge and itching.   Respiratory:  Negative for choking and stridor.    Gastrointestinal:  Negative for rectal pain.   Endocrine: Negative for polydipsia.   Genitourinary:  Negative for enuresis and genital sores.   Musculoskeletal:  Negative for back pain, neck pain and neck stiffness.   Allergic/Immunologic: Negative for immunocompromised state.   Neurological:  Negative for facial asymmetry and numbness.   Hematological:  Negative for adenopathy.   Psychiatric/Behavioral:  Negative for behavioral problems, self-injury and suicidal ideas.    Objective:      Physical Exam:  Constitutional:              Vitals reviewed: yes              Well-nourished and well-groomed: yes  Eyes:              Sclerae icteric: no              Extraocular movements intact: yes  GI:                     Bowel sounds normal: yes              Tenderness: no                          If yes, quadrant/location: not applicable              Palpable masses: no                          If yes, quadrant/location: not applicable              Hepatosplenomegaly: no              Ascites: no              Hernia: no                          If yes, type/location: not applicable              Surgical scars: yes                          If yes, type/location:  midline  not applicable  Resp:              Effort normal: yes              Breath sounds normal: yes     CV:              Regular rate and rhythm: yes              Heart sounds normal: yes              Femoral pulses normal: yes              Extremities edematous: no  Skin:              Rashes or lesions present: no                          If yes, describe:not applicable              Jaundice:: no     Musculoskeletal:              Gait normal: yes              Strength normal: yes  Psych:              Oriented to person, place, and time: yes              Affect and mood normal: yes     Additional comments: not applicable     Diagnostics:  The following labs have been reviewed: CBC  CMP     Counseling: We provided Kathie Quezada with a group education session today.  We discussed kidney transplantation at length with her, including risks, potential complications, and alternatives in the management of her renal failure.  The discussion included complications related to anesthesia, bleeding, infection, primary nonfunction, and ATN.  I discussed the typical postoperative course, length of hospitalization, the need for long-term immunosuppression, and the need for long-term routine follow-up.  I discussed living-donor and -donor transplantation and the relative advantages and disadvantages of each.  I also discussed average waiting times for both living donation and  donation.  I discussed national and center-specific survival rates.  I also mentioned the potential benefit of multicenter listing to candidates listed with centers within more than one organ procurement organization.  All questions were answered.     Patient advised that it is recommended that all transplant candidates, and their close contacts and household members receive Covid vaccination.     Final determination of transplant candidacy will be made once evaluation is complete and reviewed by the Kidney & Kidney/Pancreas  Selection Committee.     Coronavirus disease (COVID-19) caused by severe acute respiratory virus coronavirus 2 (SARS-C0V 2) is associated with increased mortality in solid organ transplant recipients (SOT) compared to non-transplant patients. Vaccine responses to vaccination are depressed against SARS-CoV2 compared to normal individuals but improve with third vaccination doses. Vaccination prior to SOT provides both the best opportunity for transplant candidates to develop protective immunity and to reduce the risk of serious COVID19 infections post transplantation. Organ transplant candidates at [facility] will be required to receive SARS-CoV-2 vaccination prior to being listed with a an active status, whenever possible. Exceptions will be made for disability related reasons or for sincerely held Mu-ism beliefs.         TxpSurgAssessPlan  Transplant Surgery - Candidacy   Assessment/Plan:   Kathie Quezada has end stage renal disease (ESRD) on dialysis. I see no surgical contraindication to placing a kidney transplant. Based on available information, Kathie Quezada is a suitable kidney transplant candidate.   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     Additional testing to be completed according to the Written Order Guidelines for Adult Pre-kidney and Pancreas Transplant Evaluation (KI-02).  Interpretation of tests and discussion of patient management involves all members of the multidisciplinary transplant team.     Stepan Bernardo MD

## 2024-05-02 NOTE — PROGRESS NOTES
PRE-OP TEACHING NOTE    Kathie Quezada is here today for pre-op appointments.  Pre-op instructions reviewed and written information was provided.  Post kidney transplant education book provided. All questions were answered.  Discussed possibility that surgery may be rescheduled if the program is busy with  donor transplants.  Patient agreed and verbalized understanding of all instructions.

## 2024-05-03 DIAGNOSIS — I50.32 CHRONIC DIASTOLIC HEART FAILURE: Chronic | ICD-10-CM

## 2024-05-03 DIAGNOSIS — I15.0 RENOVASCULAR HYPERTENSION: ICD-10-CM

## 2024-05-03 RX ORDER — HYDRALAZINE HYDROCHLORIDE 50 MG/1
50 TABLET, FILM COATED ORAL EVERY 12 HOURS
Qty: 180 TABLET | Refills: 2 | Status: ON HOLD | OUTPATIENT
Start: 2024-05-03 | End: 2024-05-27 | Stop reason: HOSPADM

## 2024-05-03 NOTE — TELEPHONE ENCOUNTER
Provider Staff:  Action required for this patient    Requires labs      Please see care gap opportunities below in Care Due Message.    Thanks!  [facility]     Appointments      Date Provider   Last Visit   2/2/2024 Elan Price MD   Next Visit   Visit date not found Elan Price MD     Refill Decision Note   Kathie Quezada  is requesting a refill authorization.  Brief Assessment and Rationale for Refill:  Approve     Medication Therapy Plan:         Comments:     Note composed:11:51 AM 05/03/2024

## 2024-05-03 NOTE — TELEPHONE ENCOUNTER
Care Due:                  Date            Visit Type   Department     Provider  --------------------------------------------------------------------------------                                MYCHART                              ANNUAL                              CHECKUP/PHY  NOM INTERNAL  Last Visit: 02-      [facility]       Elan Price  Next Visit: None Scheduled  None         None Found                                                            Last  Test          Frequency    Reason                     Performed    Due Date  --------------------------------------------------------------------------------    TSH.........  12 months..  SYNTHROID................  09- 09-    Vitamin D...  12 months..  cholecalciferol,.........  03- 03-    [employer] Embedded Care Due Messages. Reference number: [id number].   5/03/2024 11:21:59 AM CDT

## 2024-05-07 ENCOUNTER — LAB VISIT (OUTPATIENT)
Dept: LAB | Facility: HOSPITAL | Age: 64
End: 2024-05-07
Payer: MEDICARE

## 2024-05-07 DIAGNOSIS — D68.2 FACTOR XII DEFICIENCY: ICD-10-CM

## 2024-05-07 DIAGNOSIS — D68.2 FACTOR XII DEFICIENCY: Primary | ICD-10-CM

## 2024-05-07 LAB
APTT PPP: 33.3 SEC (ref 21–32)
BASOPHILS # BLD AUTO: 0.07 K/UL (ref 0–0.2)
BASOPHILS NFR BLD: 0.5 % (ref 0–1.9)
DIFFERENTIAL METHOD BLD: ABNORMAL
EOSINOPHIL # BLD AUTO: 0.2 K/UL (ref 0–0.5)
EOSINOPHIL NFR BLD: 1.2 % (ref 0–8)
ERYTHROCYTE [DISTWIDTH] IN BLOOD BY AUTOMATED COUNT: 16.4 % (ref 11.5–14.5)
FACT XII ACT/NOR PPP: 22 % (ref 30–130)
HCT VFR BLD AUTO: 38.8 % (ref 37–48.5)
HGB BLD-MCNC: 12.3 G/DL (ref 12–16)
IMM GRANULOCYTES # BLD AUTO: 0.08 K/UL (ref 0–0.04)
IMM GRANULOCYTES NFR BLD AUTO: 0.6 % (ref 0–0.5)
INR PPP: 1 (ref 0.8–1.2)
LYMPHOCYTES # BLD AUTO: 2.5 K/UL (ref 1–4.8)
LYMPHOCYTES NFR BLD: 18.6 % (ref 18–48)
MCH RBC QN AUTO: 25.4 PG (ref 27–31)
MCHC RBC AUTO-ENTMCNC: 31.7 G/DL (ref 32–36)
MCV RBC AUTO: 80 FL (ref 82–98)
MONOCYTES # BLD AUTO: 0.9 K/UL (ref 0.3–1)
MONOCYTES NFR BLD: 6.8 % (ref 4–15)
NEUTROPHILS # BLD AUTO: 9.5 K/UL (ref 1.8–7.7)
NEUTROPHILS NFR BLD: 72.3 % (ref 38–73)
NRBC BLD-RTO: 0 /100 WBC
PLATELET # BLD AUTO: 251 K/UL (ref 150–450)
PMV BLD AUTO: 10 FL (ref 9.2–12.9)
PROTHROMBIN TIME: 11.4 SEC (ref 9–12.5)
RBC # BLD AUTO: 4.84 M/UL (ref 4–5.4)
WBC # BLD AUTO: 13.2 K/UL (ref 3.9–12.7)

## 2024-05-07 PROCEDURE — 85025 COMPLETE CBC W/AUTO DIFF WBC: CPT | Mod: HCNC,TXP | Performed by: PHYSICIAN ASSISTANT

## 2024-05-07 PROCEDURE — 85610 PROTHROMBIN TIME: CPT | Mod: HCNC,TXP | Performed by: PHYSICIAN ASSISTANT

## 2024-05-07 PROCEDURE — 85730 THROMBOPLASTIN TIME PARTIAL: CPT | Mod: HCNC,NTX | Performed by: PHYSICIAN ASSISTANT

## 2024-05-07 PROCEDURE — 85397 CLOTTING FUNCT ACTIVITY: CPT | Mod: HCNC,NTX | Performed by: PHYSICIAN ASSISTANT

## 2024-05-07 PROCEDURE — 85246 CLOT FACTOR VIII VW ANTIGEN: CPT | Mod: HCNC,NTX | Performed by: PHYSICIAN ASSISTANT

## 2024-05-07 PROCEDURE — 36415 COLL VENOUS BLD VENIPUNCTURE: CPT | Mod: HCNC,TXP | Performed by: PHYSICIAN ASSISTANT

## 2024-05-07 PROCEDURE — 85280 CLOT FACTOR XII HAGEMAN: CPT | Mod: HCNC,NTX | Performed by: PHYSICIAN ASSISTANT

## 2024-05-08 ENCOUNTER — OFFICE VISIT (OUTPATIENT)
Dept: HEMATOLOGY/ONCOLOGY | Facility: CLINIC | Age: 64
End: 2024-05-08
Payer: MEDICARE

## 2024-05-08 VITALS
WEIGHT: 238.75 LBS | DIASTOLIC BLOOD PRESSURE: 80 MMHG | HEART RATE: 87 BPM | TEMPERATURE: 98 F | OXYGEN SATURATION: 98 % | BODY MASS INDEX: 35.36 KG/M2 | SYSTOLIC BLOOD PRESSURE: 136 MMHG | RESPIRATION RATE: 18 BRPM | HEIGHT: 69 IN

## 2024-05-08 DIAGNOSIS — D68.2 FACTOR XII DEFICIENCY: Primary | ICD-10-CM

## 2024-05-08 DIAGNOSIS — Z76.82 ORGAN TRANSPLANT CANDIDATE: ICD-10-CM

## 2024-05-08 PROCEDURE — 3079F DIAST BP 80-89 MM HG: CPT | Mod: HCNC,CPTII,S$GLB, | Performed by: PHYSICIAN ASSISTANT

## 2024-05-08 PROCEDURE — 4010F ACE/ARB THERAPY RXD/TAKEN: CPT | Mod: HCNC,CPTII,S$GLB, | Performed by: PHYSICIAN ASSISTANT

## 2024-05-08 PROCEDURE — 3066F NEPHROPATHY DOC TX: CPT | Mod: HCNC,CPTII,S$GLB, | Performed by: PHYSICIAN ASSISTANT

## 2024-05-08 PROCEDURE — 99214 OFFICE O/P EST MOD 30 MIN: CPT | Mod: HCNC,S$GLB,, | Performed by: PHYSICIAN ASSISTANT

## 2024-05-08 PROCEDURE — 3075F SYST BP GE 130 - 139MM HG: CPT | Mod: HCNC,CPTII,S$GLB, | Performed by: PHYSICIAN ASSISTANT

## 2024-05-08 PROCEDURE — 3008F BODY MASS INDEX DOCD: CPT | Mod: HCNC,CPTII,S$GLB, | Performed by: PHYSICIAN ASSISTANT

## 2024-05-08 PROCEDURE — 1160F RVW MEDS BY RX/DR IN RCRD: CPT | Mod: HCNC,CPTII,S$GLB, | Performed by: PHYSICIAN ASSISTANT

## 2024-05-08 PROCEDURE — 99999 PR PBB SHADOW E&M-EST. PATIENT-LVL IV: CPT | Mod: PBBFAC,HCNC,, | Performed by: PHYSICIAN ASSISTANT

## 2024-05-08 PROCEDURE — 1159F MED LIST DOCD IN RCRD: CPT | Mod: HCNC,CPTII,S$GLB, | Performed by: PHYSICIAN ASSISTANT

## 2024-05-08 RX ORDER — HEPARIN SODIUM 1000 [USP'U]/ML
INJECTION, SOLUTION INTRAVENOUS; SUBCUTANEOUS
Status: ON HOLD | COMMUNITY
Start: 2023-11-30 | End: 2024-05-24 | Stop reason: HOSPADM

## 2024-05-08 RX ORDER — MIDAZOLAM HYDROCHLORIDE 1 MG/ML
INJECTION, SOLUTION INTRAMUSCULAR; INTRAVENOUS
Status: ON HOLD | COMMUNITY
Start: 2023-11-30 | End: 2024-05-24 | Stop reason: HOSPADM

## 2024-05-08 RX ORDER — FENTANYL CITRATE 50 UG/ML
INJECTION, SOLUTION INTRAMUSCULAR; INTRAVENOUS
Status: ON HOLD | COMMUNITY
Start: 2023-11-30 | End: 2024-05-27 | Stop reason: HOSPADM

## 2024-05-08 RX ORDER — IOHEXOL 350 MG/ML
INJECTION, SOLUTION INTRAVENOUS
Status: ON HOLD | COMMUNITY
Start: 2023-11-30 | End: 2024-05-24 | Stop reason: HOSPADM

## 2024-05-08 NOTE — PROGRESS NOTES
RECIPIENT PRE-OP NOTE    Potential Recipient Name: Kathie Quezada, [id number]  Encounter Date: 5/2/2024    Sex: female  YOB: 1960  Age: 64 y.o.    Housing/Contact Info:  [address]  [facility] [address]  Telephone Information:   Mobile [phone]    Home: [phone] (home)  Work: There is no work phone number on file.  E-mail: [email or web address]    Potential Surgery Date: 5/13/2024  Potential Donor Name: Meli Quezada, Clinic Number: [id number]  Potential Donor Relationship: daughter    Patient presents as alert and oriented x 4, pleasant, good eye contact, well groomed, recall good, concentration/judgement good, average intelligence, calm, communicative, and cooperative. Patient presents as a 64 y.o. year old female to recipient pre-op appointment for scheduled kidney living donor surgery. Patient's daughter accompanies patient.  Patient states that she is independent with ADLs at this time.  Patient states motivation to pursue organ transplant at this time.    Does patient drive?  Patient is aware will not be able to drive until medically cleared by the transplant team. Patient verbalizes understanding that patient will need assistance for all transportation needs until medically cleared to drive.    Caregivers/Transportation:  Name: Mike Quezada  Age: 45  Phone: [phone]  Relationship: daughter  Does person drive? yes  Does person have own/reliable transportation? yes    Name: Nicole Quezada  Age: 42  Phone: [phone]  Relationship: daughter  Does person drive? yes  Does person have own/reliable transportation? yes    Name: Kel Quezada  Age: 40   Phone: [phone]  Relationship: daughter  Does person drive? yes  Does person have own/reliable transportation? yes    Dependents/Others who rely on Patient/Caregiver for care: N/A    Cognitive:  Education: high school  Reading Level: 12th grade  Reports difficulty with: seeing and wears corrective  lenses.  Denies difficulty with: reading, writing, hearing, comprehension, learning, and memory    Infusion Service: patient utilizing? no  Home Health: patient utilizing? no  DME:  patient utilizing? no    Living Will: no  Healthcare Power of : no  Written LW/HCPA and verbal information presented to patient today.    Insurance: Payor: [employer] MANAGED MEDICARE / Plan: [employer] SNP HMO PPO SPECIAL NEEDS / Product Type: Medicare Advantage /   Possible concerns regarding insurance post-donation reviewed. Patient verbalizes clear understanding.    Financial:  Employment: Patient is currently disabled. Patient does plan to return to work once medically cleared. Patient referred to vocational rehabilitation.  Spouse/Significant Other Employment: Patient is a .  Patient states does expect to have any financial problems following transplant surgery.  Patient states has not conducted fundraising to assist with post-transplant costs.    Tobacco/Alcohol/Illicit Drug Abuse: Patient reports does not use tobacco products, alcohol, illicit drugs, and non-prescription medications and that patient does plan to remain abstinent.     Psychiatric History:  Patient reports receiving bereavement counseling following the death of her sister.    Coping: Patient states that she is coping well with having kidney living donor surgery. Patient states will call as needed and does understand how to access resources including the  as needed, both inpatient and outpatient.    Resources, information and support provided. Psychosocial aspects regarding organ donation and transplantation were discussed. Patient reports having a clear understanding of resources, information, support and psychosocial aspects.    Discharge Plan: Patient to discharge to own home under the care of patient's daughter post-organ transplant. Patient states that patient's daughter will be present as caregiver in the hospital. Patient's daughter will  transport patient home. Patient states agreement with not driving and not returning to work until medically cleared to do so.    Patient states having clear understanding and realistic expectations regarding the potential risks and potential benefits of organ transplantation and organ donation. Patient agrees to further discuss with health care team members and support system members, as well as to utilize available resources and express questions and/or concerns. Resources and information provided and reviewed.    Patient reports motivation to proceed with living organ donor transplantation as scheduled.     Suitability for Transplant: Patient presents as a suitable candidate for organ transplant at this time.  Patient states does have a caregiver plan, transportation plan, and lodging plan in place. Patient states that patient does have medical and prescription medicine insurance in place and does have a plan in place to afford post-transplant costs.    Patient provided verbal permission to release any necessary information to outside resources for patient care and discharge planning.  Resources and information provided and reviewed.  Patient is choosing specialty transplant mail order pharmacy.    Pharmacy Name: [facility]  Pharmacy Contact Information: [phone]    Patient states that she is aware of what patient's normal copays and deductibles are for prescription medicines.       provided psychosocial support, counseling, resources, education, assistance, and discharge planning.  remains available.    Recommendations/Additional Comments: Patient remain abstinent from use of tobacco, ETOH, and contact transplant team regarding any changes in caregiver or mental health status.      Patient states is aware of [facility] affiliation and/or partnership with agencies in home health care, LTAC, SNF, [facility], and other hospitals and clinics.

## 2024-05-08 NOTE — Clinical Note
These were recommendations from Dr. Nguyen!  Thanks, [facility]

## 2024-05-08 NOTE — PROGRESS NOTES
Section of Hematology and Stem Cell Transplantation  Follow Up Note     Visit Date: 05/08/2024    Primary Hematologic Diagnosis: Factor XII Deficiency    History of Present Ilness: (from initial HPI w/ Dr. Nguyen 12/21/2022)   , 62, is here for hematology consultation. She has DM type II with retinopathy, HTN, CVA, and multiple AKIS. She has facor XI deficinecy.  She had prolonged vaginal bleeding after each of her five deliveries. All deliveries were per vaginum. She required multiple blood transfusions for each of these episodes./ She has history of prolonged bleeding after dental procedures. She had cholecystectomy and thyroidectomy with FFP prophylaxis.  No upper/ lower GI bleeding. No hemarthrosis. No epistaxis/ hemoptysis/hematuria/vaginal bleeding currently. She had h/o menorrhagia.       She believes her father had bleeding problem from factor XI deficiency.        Interval History:   Ms. Quezada presents for follow up and pre-op/post-op recommendations as she is scheduled for renal transplant on 5/13. We repeated lab values which show a persistent Factor XII deficiency, albeit mild. We reviewed her clinical history of bleeding associated with child birth and dental procedures, and that she has had successful hemostatic challenges during surgery when receiving prophylactic FFP.     We reviewed risk of thrombosis that is increased in the post-operative setting, at that FFP can also increase that risk with repeated administration. We discussed a low dose of prophylactic anticoagulation after surgery is reasonable to prevent blood clots during the acute post-op phase while she is more immobile than usual.     She otherwise has been doing very well and is eager for her transplant. Daughter and granddaughter present who will assist in care taking post-operatively.     Past Medical History, Social History, and Past Family History are unchanged since last evaluation except for HPI.     CURRENT  MEDICATIONS:   Current Outpatient Medications   Medication Sig    calcium acetate,phosphat bind, (PHOSLO) 667 mg capsule Take 2 capsules by mouth 3 (three) times daily    cholecalciferol, vitamin D3, 125 mcg (5,000 unit) Tab Take 1 tablet (5,000 Units total) by mouth once daily.    EScitalopram oxalate (LEXAPRO) 10 MG tablet Take 1 tablet (10 mg total) by mouth every evening.    ezetimibe (ZETIA) 10 mg tablet Take 1 tablet (10 mg total) by mouth once daily.    ferrous gluconate (FERGON) 324 MG tablet Take 1 tablet (324 mg total) by mouth daily with breakfast.    hydrALAZINE (APRESOLINE) 50 MG tablet Take 1 tablet (50 mg total) by mouth every 12 (twelve) hours.    irbesartan (AVAPRO) 300 MG tablet Take 1 tablet by mouth daily    levocetirizine (XYZAL) 5 MG tablet Take 1 tablet (5 mg total) by mouth daily as needed for Allergies.    NIFEdipine (ADALAT CC) 60 MG TbSR Take 1 tablet by mouth daily    SYNTHROID 200 mcg tablet Take 1 tablet (200 mcg total) by mouth before breakfast.    traZODone (DESYREL) 50 MG tablet Take 1 to 2 tablets ( mg total) by mouth nightly as needed for Insomnia.     No current facility-administered medications for this visit.       ALLERGIES:   Review of patient's allergies indicates:   Allergen Reactions    Atorvastatin Other (See Comments)     Statin induced myalgia    Mushroom Hives    Peanut Anaphylaxis    Bactrim [sulfamethoxazole-trimethoprim] Hives    Gabapentin      Pruritis     Iodine     Peanut butter flavor     Penicillins Hives    Shellfish containing products Itching    Sulfa (sulfonamide antibiotics) Hives         Review of Systems:     Review of Systems   Constitutional: Negative.    HENT: Negative.     Respiratory:  Negative for cough and shortness of breath.    Cardiovascular:  Negative for chest pain.   Gastrointestinal:  Negative for abdominal pain and diarrhea.   Genitourinary:  Negative for frequency.   Musculoskeletal:  Negative for back pain.   Skin:  Negative for  rash.   Neurological:  Positive for headaches.   Endo/Heme/Allergies:  Bruises/bleeds easily.   Psychiatric/Behavioral:  The patient is not nervous/anxious.        Physical Exam:     Vitals:    05/08/24 1411   BP: 136/80   Pulse: 87   Resp: 18   Temp: 98.1 °F (36.7 °C)         Physical Exam  Constitutional:       General: She is not in acute distress.     Appearance: She is obese.   HENT:      Head: Normocephalic.      Right Ear: External ear normal.      Left Ear: External ear normal.      Nose: Nose normal.      Mouth/Throat:      Mouth: Mucous membranes are moist.      Pharynx: Oropharynx is clear.   Eyes:      Pupils: Pupils are equal, round, and reactive to light.   Cardiovascular:      Rate and Rhythm: Normal rate and regular rhythm.      Pulses: Normal pulses.   Pulmonary:      Effort: Pulmonary effort is normal.   Abdominal:      General: There is no distension.      Palpations: Abdomen is soft.   Musculoskeletal:         General: Swelling present. Normal range of motion.      Cervical back: Neck supple.      Right lower leg: Edema present.      Left lower leg: Edema present.   Lymphadenopathy:      Cervical: No cervical adenopathy.   Skin:     General: Skin is warm.   Neurological:      General: No focal deficit present.      Mental Status: She is alert.      Cranial Nerves: No cranial nerve deficit.           Labs:   Lab Results   Component Value Date    WBC 13.20 (H) 05/07/2024    HGB 12.3 05/07/2024    HCT 38.8 05/07/2024    MCV 80 (L) 05/07/2024     05/07/2024       Lab Results   Component Value Date     05/02/2024    K 4.6 05/02/2024    CL 97 05/02/2024    CO2 27 05/02/2024    BUN 50 (H) 05/02/2024    CREATININE 6.8 (H) 05/02/2024    ALBUMIN 4.3 05/02/2024    BILITOT 0.5 05/02/2024    ALKPHOS 122 05/02/2024    AST 17 05/02/2024    ALT 11 05/02/2024            Assessment and Plan:   Kathie Quezada (Kathie) is a pleasant 64 y.o.female with Factor XII Deficiency.    Factor XII  Deficiency   - Pt reports she was diagnosed with Factor XII deficiency in the 1980s  - Has had bleeding following various hemostatic challenges including vaginal deliveries, dental procedures. Received prophylactic FFP for cholecystectomy and thyroidectomy w/o complication.  - FXII level is 22%, mild and would not typically correlate with bleeding NOR clotting diathesis. However, patient with known hx of bleeding complications. PTT remains prolonged, as expected.   - Most recent VWD activity / factor normal.   - Recommend 1u FFP same day pre-op renal transplant and 1u FFP POD #1 to prevent bleeding complications. Given history of heart failure, be cautious of volume overload with product support.    - Consider inpatient hematology consult post-operatively as needed for any bleeding complications.     2. Thromboprophylaxis  - There still heavily debated discussions of FXII having hypercoagulable risks. Unfortunately, this is a rare disorder and there is not enough large-scale data to support overt hypercoagulability of FXII deficiency that warrants intervention. This patient has never experienced a thrombosis nor does she have a family history of thrombosis, however, she has known bleeding complications. FFP can also increase hypercoagulability.  - As such, following an operation with expected decreased mobility while hospitalized, it is very reasonable to provide routine post-surgical thromboprophylaxis (subcutaneous heparin 5,000u q8hr) after hemostasis has been achieved for the remainder of her hospitalization.      3. Stage 3B CKD  4. Renal Transplant Candidate  - Scheduled for kidney transplant on 5/13      Follow Up:      No specific f/u needed at present. Discussed with Dr. Nguyen who agreed with the above recommendations.     Thank you for allowing me to partake in the care of this patient.       Purvi Tellez PA-C  Hematology, Oncology, and Stem Cell Transplantation  [facility]

## 2024-05-09 ENCOUNTER — TELEPHONE (OUTPATIENT)
Dept: TRANSPLANT | Facility: CLINIC | Age: 64
End: 2024-05-09
Payer: MEDICARE

## 2024-05-09 DIAGNOSIS — Z76.82 ORGAN TRANSPLANT CANDIDATE: Primary | ICD-10-CM

## 2024-05-09 LAB
VWF AG ACT/NOR PPP IA: 114 % (ref 55–200)
VWF:AC ACT/NOR PPP IA: 92 % (ref 55–200)

## 2024-05-09 NOTE — TELEPHONE ENCOUNTER
Patient notified that additional testing will be need regarding recommendations for surgery given abnormal blood clotting tests. Surgery will be postponed at this time.

## 2024-05-10 ENCOUNTER — LAB VISIT (OUTPATIENT)
Dept: LAB | Facility: HOSPITAL | Age: 64
End: 2024-05-10
Attending: INTERNAL MEDICINE
Payer: MEDICARE

## 2024-05-10 DIAGNOSIS — Z76.82 ORGAN TRANSPLANT CANDIDATE: ICD-10-CM

## 2024-05-10 PROCEDURE — 81241 F5 GENE: CPT | Mod: HCNC,TXP | Performed by: INTERNAL MEDICINE

## 2024-05-10 PROCEDURE — 36415 COLL VENOUS BLD VENIPUNCTURE: CPT | Mod: HCNC,TXP | Performed by: INTERNAL MEDICINE

## 2024-05-10 PROCEDURE — 86147 CARDIOLIPIN ANTIBODY EA IG: CPT | Mod: 59,HCNC,TXP | Performed by: INTERNAL MEDICINE

## 2024-05-10 PROCEDURE — 81240 F2 GENE: CPT | Mod: HCNC,TXP | Performed by: INTERNAL MEDICINE

## 2024-05-13 LAB
CARDIOLIPIN IGG SER IA-ACNC: <9.4 GPL (ref 0–14.99)
CARDIOLIPIN IGM SER IA-ACNC: <9.4 MPL (ref 0–12.49)
F2 C.20210G>A GENO BLD/T: NEGATIVE
F5 P.R506Q BLD/T QL: NEGATIVE

## 2024-05-15 ENCOUNTER — TELEPHONE (OUTPATIENT)
Dept: HEMATOLOGY/ONCOLOGY | Facility: CLINIC | Age: 64
End: 2024-05-15
Payer: MEDICARE

## 2024-05-16 ENCOUNTER — PATIENT MESSAGE (OUTPATIENT)
Dept: ADMINISTRATIVE | Facility: OTHER | Age: 64
End: 2024-05-16
Payer: MEDICARE

## 2024-05-22 ENCOUNTER — TELEPHONE (OUTPATIENT)
Dept: TRANSPLANT | Facility: CLINIC | Age: 64
End: 2024-05-22
Payer: MEDICARE

## 2024-05-22 NOTE — TELEPHONE ENCOUNTER
Recipient Information  Name: Kathie Quezada  MRN: [id number]  Age: 64 y.o.    BMI: 35.2       Primary Surgeon:Az     Dialysis status: hemodialysis in center  TTS  K+ at pre-op:   Lab Results   Component Value Date    K 4.6 05/02/2024         Plan for K+: n/a    Re-Transplant: No  Currently on Blood thinners? No     Induction: Thymoglobulin    Other Considerations: Will need 1U FFP on induction and post-op. Standard DVT prophylaxis and Lovenox x 2 weeks on discharge.     Donor Information  Name: Meli Quezada   MRN: [id number]  Age: 42     GFR:  166 ml/min  BMI: 32.5         Surgeon: Dean and Seal  UNOS ID: [id number]  Kidney to be donated: the left side          Final CXM Results:  HLA Flow Crossmatch (Allo) Interpretation (no units)   Date Value   05/02/2024     FINAL CROSSMATCH: This crossmatch was performed without pronase.     WEAK DSA DETECTED: A1(1016)    Results reported to Belinda Joshua on 05/07/2024 at 10:30 AM. Obtained VRB       T MCS Average - XM Allo (no units)   Date Value   05/02/2024 -15.20   05/02/2024 -3.90   05/02/2024 -8.70     T Cell Results - XM Allo (no units)   Date Value   05/02/2024 Negative   05/02/2024 Negative   05/02/2024 Negative     B MCS Average - XM Allo (no units)   Date Value   05/02/2024 -27.20   05/02/2024 -19.10   05/02/2024 -22.30     B Cell Results - XM Allo (no units)   Date Value   05/02/2024 Negative   05/02/2024 Negative   05/02/2024 Negative        T MCS Average - XM Auto (no units)   Date Value   05/02/2024 -7.20     T Cell Results - XM Auto (no units)   Date Value   05/02/2024 Negative     B MCS Average - XM Auto (no units)   Date Value   05/02/2024 -4.30     B Cell Results - XM Auto (no units)   Date Value   05/02/2024 Negative       Comments: Patient to receive standard DVT prophylaxis in patient and Lovenox for 2 weeks on discharge.

## 2024-05-22 NOTE — TELEPHONE ENCOUNTER
Patient notified that we can proceed with surgery tomorrow as planned. Will be placed on Lovenox for 2 weeks after surgery. Patient agreed.     Preop instructions reviewed, will check into [facility] for 5 am. All questions were answered.

## 2024-05-22 NOTE — TELEPHONE ENCOUNTER
Recipient Information  Name: Kathie Quezada  MRN: [id number]  Age: 64 y.o.    BMI: 35.2       Primary Surgeon:Az     Dialysis status: hemodialysis in center  TTS  K+ at pre-op:   Lab Results   Component Value Date    K 4.6 05/02/2024         Plan for K+: n/a    Re-Transplant: No  Currently on Blood thinners? No     Induction: Thymoglobulin    Other Considerations: Will need 1U FFP on induction and post-op. Standard DVT prophylaxis and Lovenox x 2 weeks on discharge.     Donor Information  Name: Meli Quezada   MRN: [id number]  Age: 42     GFR:  166 ml/min  BMI: 32.5         Surgeon: Dean and Seal  UNOS ID: [id number]  Kidney to be donated: the left side          Final CXM Results:  HLA Flow Crossmatch (Allo) Interpretation (no units)   Date Value   05/02/2024     FINAL CROSSMATCH: This crossmatch was performed without pronase.     WEAK DSA DETECTED: A1(1016)    Results reported to Belinda Joshua on 05/07/2024 at 10:30 AM. Obtained VRB       T MCS Average - XM Allo (no units)   Date Value   05/02/2024 -15.20   05/02/2024 -3.90   05/02/2024 -8.70     T Cell Results - XM Allo (no units)   Date Value   05/02/2024 Negative   05/02/2024 Negative   05/02/2024 Negative     B MCS Average - XM Allo (no units)   Date Value   05/02/2024 -27.20   05/02/2024 -19.10   05/02/2024 -22.30     B Cell Results - XM Allo (no units)   Date Value   05/02/2024 Negative   05/02/2024 Negative   05/02/2024 Negative        T MCS Average - XM Auto (no units)   Date Value   05/02/2024 -7.20     T Cell Results - XM Auto (no units)   Date Value   05/02/2024 Negative     B MCS Average - XM Auto (no units)   Date Value   05/02/2024 -4.30     B Cell Results - XM Auto (no units)   Date Value   05/02/2024 Negative       Comments: Patient to receive standard DVT prophylaxis in patient and Lovenox for 2 weeks on discharge.

## 2024-05-22 NOTE — TELEPHONE ENCOUNTER
"Donor/Recipient Compatibility    Test/Item Donor Recipient Acceptable/Not Acceptable   ABO B POS B NEG Reviewed   HBsAb (Hepatitis B Surface Antibody) <3.00, Non-reactive Hep B S Ab   Date Value   11/29/2023 23.24 mIU/mL   11/29/2023 Reactive    Reviewed   HBsAg (Hepatitis B Surface Antigen) Non-reactive Hepatitis B Surface Ag (no units)   Date Value   05/02/2024 Non-reactive    Reviewed   HBcAb (Hepatitis Core Antibody) Non-reactive Hep B Core Total Ab (no units)   Date Value   05/02/2024 Reactive (A)    Reviewed   HCVab (Hepatitis C antibody) Non-reactive Hepatitis C Ab (no units)   Date Value   11/17/2022 Reactive (A)    Reviewed   HIV  Non-reactive HIV 1/2 Ag/Ab (no units)   Date Value   05/02/2024 Non-reactive    Reviewed   CMV Reactive CMV IgG Interpretation (no units)   Date Value   05/02/2024 Reactive (A)     If no results found, check Results Review for Sendouts Reviewed   RPR Non-reactive RPR (no units)   Date Value   11/17/2022 Non-reactive    Reviewed     Potassium 3.8 Potassium (mmol/L)   Date Value   05/02/2024 4.6    Reviewed   Creatinine 0.8 Creatinine (mg/dL)   Date Value   05/02/2024 6.8 (H)    Reviewed   eGFR  >60.0 eGFR (mL/min/1.73 m^2)   Date Value   05/02/2024 6.4 (A)    Reviewed   Creatinine Clearance 166 No results found for: "CRCLEARANCE" Reviewed   BUN 9 BUN (mg/dL)   Date Value   05/02/2024 50 (H)    Reviewed   WBC 5.45 WBC (K/uL)   Date Value   05/07/2024 13.20 (H)    Reviewed   Hemoglobin 13.0 Hemoglobin (g/dL)   Date Value   05/07/2024 12.3    Reviewed   Hematocrit 38.5 POC Hematocrit (%PCV)   Date Value   02/01/2023 29 (L)     Hematocrit (%)   Date Value   05/07/2024 38.8    Reviewed   Platelet count 220 Platelets (K/uL)   Date Value   05/07/2024 251    Reviewed   COVID Vax status Vaccinated Vaccinated      Recipient Only    X Match reviewed     Donor Only    Imaging Defer to surgery   MELISA Testing Reviewed  Look in the Labs tab of Chart Review to find the results or Results Review " activity.     Leticia Lares, DO  Transplant Nephrology

## 2024-05-22 NOTE — TELEPHONE ENCOUNTER
"Donor/Recipient Compatibility    Test/Item Donor Recipient Acceptable/Not Acceptable   ABO B POS B NEG Acceptable   HBsAb (Hepatitis B Surface Antibody) <3.00, Non-reactive Hep B S Ab   Date Value   11/29/2023 23.24 mIU/mL   11/29/2023 Reactive    Acceptable   HBsAg (Hepatitis B Surface Antigen) Non-reactive Hepatitis B Surface Ag (no units)   Date Value   05/02/2024 Non-reactive    Acceptable   HBcAb (Hepatitis Core Antibody) Non-reactive Hep B Core Total Ab (no units)   Date Value   05/02/2024 Reactive (A)    Acceptable   HCVab (Hepatitis C antibody) Non-reactive Hepatitis C Ab (no units)   Date Value   11/17/2022 Reactive (A)    Acceptable   HIV  Non-reactive HIV 1/2 Ag/Ab (no units)   Date Value   05/02/2024 Non-reactive    Acceptable   CMV Reactive CMV IgG Interpretation (no units)   Date Value   05/02/2024 Reactive (A)     If no results found, check Results Review for Sendouts Acceptable   RPR Non-reactive RPR (no units)   Date Value   11/17/2022 Non-reactive    Acceptable     Potassium 3.8 Potassium (mmol/L)   Date Value   05/02/2024 4.6    Acceptable   Creatinine 0.8 Creatinine (mg/dL)   Date Value   05/02/2024 6.8 (H)    Acceptable   eGFR  >60.0 eGFR (mL/min/1.73 m^2)   Date Value   05/02/2024 6.4 (A)    Acceptable   Creatinine Clearance 166 No results found for: "CRCLEARANCE" Acceptable   BUN 9 BUN (mg/dL)   Date Value   05/02/2024 50 (H)    Acceptable   WBC 5.45 WBC (K/uL)   Date Value   05/07/2024 13.20 (H)    Acceptable   Hemoglobin 13.0 Hemoglobin (g/dL)   Date Value   05/07/2024 12.3    Acceptable   Hematocrit 38.5 POC Hematocrit (%PCV)   Date Value   02/01/2023 29 (L)     Hematocrit (%)   Date Value   05/07/2024 38.8    Acceptable   Platelet count 220 Platelets (K/uL)   Date Value   05/07/2024 251    Acceptable   COVID Vax status Unvaccinated Vaccinated      Recipient Only    X Match reviewed     Donor Only    Imaging reviewed imaging and agree with selection coordinator for left kidney   MELISA Testing " Acceptable  Look in the Labs tab of Chart Review to find the results or Results Review activity.     Vicki Mendoza MD

## 2024-05-23 ENCOUNTER — ANESTHESIA (OUTPATIENT)
Dept: SURGERY | Facility: HOSPITAL | Age: 64
DRG: 652 | End: 2024-05-23
Payer: MEDICARE

## 2024-05-23 ENCOUNTER — HOSPITAL ENCOUNTER (INPATIENT)
Facility: HOSPITAL | Age: 64
LOS: 4 days | Discharge: HOME | DRG: 652 | End: 2024-05-27
Attending: SURGERY | Admitting: TRANSPLANT SURGERY
Payer: MEDICARE

## 2024-05-23 DIAGNOSIS — E83.51 HYPOCALCEMIA: ICD-10-CM

## 2024-05-23 DIAGNOSIS — Z29.89 PROPHYLACTIC IMMUNOTHERAPY: ICD-10-CM

## 2024-05-23 DIAGNOSIS — E11.3553 CONTROLLED TYPE 2 DIABETES MELLITUS WITH STABLE PROLIFERATIVE RETINOPATHY OF BOTH EYES, UNSPECIFIED WHETHER LONG TERM INSULIN USE: Chronic | ICD-10-CM

## 2024-05-23 DIAGNOSIS — D62 ACUTE BLOOD LOSS ANEMIA: ICD-10-CM

## 2024-05-23 DIAGNOSIS — Z79.60 LONG-TERM USE OF IMMUNOSUPPRESSANT MEDICATION: ICD-10-CM

## 2024-05-23 DIAGNOSIS — Z99.2 ESRD (END STAGE RENAL DISEASE) ON DIALYSIS: ICD-10-CM

## 2024-05-23 DIAGNOSIS — D68.1 FACTOR XI DEFICIENCY: Chronic | ICD-10-CM

## 2024-05-23 DIAGNOSIS — N18.6 ESRD (END STAGE RENAL DISEASE) ON DIALYSIS: ICD-10-CM

## 2024-05-23 DIAGNOSIS — D63.8 ANEMIA OF CHRONIC DISEASE: ICD-10-CM

## 2024-05-23 DIAGNOSIS — Z76.82 ORGAN TRANSPLANT CANDIDATE: ICD-10-CM

## 2024-05-23 DIAGNOSIS — I15.0 RENOVASCULAR HYPERTENSION: Chronic | ICD-10-CM

## 2024-05-23 DIAGNOSIS — E66.01 CLASS 2 SEVERE OBESITY DUE TO EXCESS CALORIES WITH SERIOUS COMORBIDITY AND BODY MASS INDEX (BMI) OF 35.0 TO 35.9 IN ADULT: ICD-10-CM

## 2024-05-23 DIAGNOSIS — Z94.0 S/P KIDNEY TRANSPLANT: Primary | ICD-10-CM

## 2024-05-23 LAB
ABO + RH BLD: NORMAL
ALBUMIN SERPL BCP-MCNC: 2.9 G/DL (ref 3.5–5.2)
ALBUMIN SERPL BCP-MCNC: 2.9 G/DL (ref 3.5–5.2)
ALBUMIN SERPL BCP-MCNC: 3.8 G/DL (ref 3.5–5.2)
ANION GAP SERPL CALC-SCNC: 11 MMOL/L (ref 8–16)
ANION GAP SERPL CALC-SCNC: 12 MMOL/L (ref 8–16)
ANION GAP SERPL CALC-SCNC: 13 MMOL/L (ref 8–16)
BASOPHILS # BLD AUTO: 0.02 K/UL (ref 0–0.2)
BASOPHILS NFR BLD: 0.1 % (ref 0–1.9)
BLD GP AB SCN CELLS X3 SERPL QL: NORMAL
BUN SERPL-MCNC: 42 MG/DL (ref 8–23)
BUN SERPL-MCNC: 43 MG/DL (ref 8–23)
BUN SERPL-MCNC: 45 MG/DL (ref 8–23)
CALCIUM SERPL-MCNC: 7.1 MG/DL (ref 8.7–10.5)
CALCIUM SERPL-MCNC: 7.3 MG/DL (ref 8.7–10.5)
CALCIUM SERPL-MCNC: 8.8 MG/DL (ref 8.7–10.5)
CHLORIDE SERPL-SCNC: 101 MMOL/L (ref 95–110)
CHLORIDE SERPL-SCNC: 103 MMOL/L (ref 95–110)
CHLORIDE SERPL-SCNC: 105 MMOL/L (ref 95–110)
CO2 SERPL-SCNC: 20 MMOL/L (ref 23–29)
CO2 SERPL-SCNC: 24 MMOL/L (ref 23–29)
CO2 SERPL-SCNC: 26 MMOL/L (ref 23–29)
CREAT SERPL-MCNC: 5.3 MG/DL (ref 0.5–1.4)
CREAT SERPL-MCNC: 6.1 MG/DL (ref 0.5–1.4)
CREAT SERPL-MCNC: 7.2 MG/DL (ref 0.5–1.4)
DIFFERENTIAL METHOD BLD: ABNORMAL
EOSINOPHIL # BLD AUTO: 0 K/UL (ref 0–0.5)
EOSINOPHIL NFR BLD: 0 % (ref 0–8)
ERYTHROCYTE [DISTWIDTH] IN BLOOD BY AUTOMATED COUNT: 17.3 % (ref 11.5–14.5)
EST. GFR  (NO RACE VARIABLE): 6 ML/MIN/1.73 M^2
EST. GFR  (NO RACE VARIABLE): 7.3 ML/MIN/1.73 M^2
EST. GFR  (NO RACE VARIABLE): 8.6 ML/MIN/1.73 M^2
ESTIMATED AVG GLUCOSE: 111 MG/DL (ref 68–131)
GLUCOSE SERPL-MCNC: 110 MG/DL (ref 70–110)
GLUCOSE SERPL-MCNC: 116 MG/DL (ref 70–110)
GLUCOSE SERPL-MCNC: 176 MG/DL (ref 70–110)
GLUCOSE SERPL-MCNC: 177 MG/DL (ref 70–110)
HBA1C MFR BLD: 5.5 % (ref 4–5.6)
HBV CORE AB SERPL QL IA: REACTIVE
HBV SURFACE AB SER-ACNC: 90.87 MIU/ML
HBV SURFACE AB SER-ACNC: REACTIVE M[IU]/ML
HBV SURFACE AG SERPL QL IA: NORMAL
HCO3 UR-SCNC: 29.6 MMOL/L (ref 24–28)
HCT VFR BLD AUTO: 31.3 % (ref 37–48.5)
HCT VFR BLD AUTO: 34.4 % (ref 37–48.5)
HCT VFR BLD CALC: 38 %PCV (ref 36–54)
HCV AB SERPL QL IA: REACTIVE
HCV RNA SERPL QL NAA+PROBE: NOT DETECTED
HCV RNA SPEC NAA+PROBE-ACNC: NOT DETECTED IU/ML
HGB BLD-MCNC: 10.5 G/DL (ref 12–16)
HIV 1+2 AB+HIV1 P24 AG SERPL QL IA: NORMAL
IMM GRANULOCYTES # BLD AUTO: 0.05 K/UL (ref 0–0.04)
IMM GRANULOCYTES NFR BLD AUTO: 0.4 % (ref 0–0.5)
LYMPHOCYTES # BLD AUTO: 0.1 K/UL (ref 1–4.8)
LYMPHOCYTES NFR BLD: 0.4 % (ref 18–48)
MCH RBC QN AUTO: 25.5 PG (ref 27–31)
MCHC RBC AUTO-ENTMCNC: 30.5 G/DL (ref 32–36)
MCV RBC AUTO: 84 FL (ref 82–98)
MONOCYTES # BLD AUTO: 0.2 K/UL (ref 0.3–1)
MONOCYTES NFR BLD: 1.3 % (ref 4–15)
NEUTROPHILS # BLD AUTO: 13.8 K/UL (ref 1.8–7.7)
NEUTROPHILS NFR BLD: 97.8 % (ref 38–73)
NRBC BLD-RTO: 0 /100 WBC
PCO2 BLDA: 50 MMHG (ref 35–45)
PH SMN: 7.38 [PH] (ref 7.35–7.45)
PHOSPHATE SERPL-MCNC: 4.3 MG/DL (ref 2.7–4.5)
PHOSPHATE SERPL-MCNC: 4.4 MG/DL (ref 2.7–4.5)
PHOSPHATE SERPL-MCNC: 4.6 MG/DL (ref 2.7–4.5)
PLATELET # BLD AUTO: 109 K/UL (ref 150–450)
PMV BLD AUTO: 11 FL (ref 9.2–12.9)
PO2 BLDA: 43 MMHG (ref 40–60)
POC BE: 4 MMOL/L
POC IONIZED CALCIUM: 1 MMOL/L (ref 1.06–1.42)
POC SATURATED O2: 77 % (ref 95–100)
POC TCO2: 31 MMOL/L (ref 24–29)
POCT GLUCOSE: 187 MG/DL (ref 70–110)
POCT GLUCOSE: 189 MG/DL (ref 70–110)
POCT GLUCOSE: 217 MG/DL (ref 70–110)
POTASSIUM BLD-SCNC: 4.2 MMOL/L (ref 3.5–5.1)
POTASSIUM SERPL-SCNC: 3.9 MMOL/L (ref 3.5–5.1)
POTASSIUM SERPL-SCNC: 4 MMOL/L (ref 3.5–5.1)
POTASSIUM SERPL-SCNC: 4.2 MMOL/L (ref 3.5–5.1)
RBC # BLD AUTO: 4.11 M/UL (ref 4–5.4)
SAMPLE: ABNORMAL
SODIUM BLD-SCNC: 139 MMOL/L (ref 136–145)
SODIUM SERPL-SCNC: 137 MMOL/L (ref 136–145)
SODIUM SERPL-SCNC: 138 MMOL/L (ref 136–145)
SODIUM SERPL-SCNC: 140 MMOL/L (ref 136–145)
SPECIMEN OUTDATE: NORMAL
WBC # BLD AUTO: 14.13 K/UL (ref 3.9–12.7)

## 2024-05-23 PROCEDURE — 80069 RENAL FUNCTION PANEL: CPT | Mod: HCNC | Performed by: STUDENT IN AN ORGANIZED HEALTH CARE EDUCATION/TRAINING PROGRAM

## 2024-05-23 PROCEDURE — 36415 COLL VENOUS BLD VENIPUNCTURE: CPT | Mod: HCNC | Performed by: SURGERY

## 2024-05-23 PROCEDURE — 71000015 HC POSTOP RECOV 1ST HR: Mod: HCNC | Performed by: SURGERY

## 2024-05-23 PROCEDURE — 63600175 PHARM REV CODE 636 W HCPCS: Mod: HCNC | Performed by: STUDENT IN AN ORGANIZED HEALTH CARE EDUCATION/TRAINING PROGRAM

## 2024-05-23 PROCEDURE — 37000009 HC ANESTHESIA EA ADD 15 MINS: Mod: HCNC | Performed by: SURGERY

## 2024-05-23 PROCEDURE — 82962 GLUCOSE BLOOD TEST: CPT | Mod: HCNC | Performed by: SURGERY

## 2024-05-23 PROCEDURE — C1729 CATH, DRAINAGE: HCPCS | Mod: HCNC | Performed by: SURGERY

## 2024-05-23 PROCEDURE — 50360 RNL ALTRNSPLJ W/O RCP NFRCT: CPT | Mod: HCNC,LT,, | Performed by: SURGERY

## 2024-05-23 PROCEDURE — 87522 HEPATITIS C REVRS TRNSCRPJ: CPT | Mod: HCNC | Performed by: SURGERY

## 2024-05-23 PROCEDURE — 0TY10Z0 TRANSPLANTATION OF LEFT KIDNEY, ALLOGENEIC, OPEN APPROACH: ICD-10-PCS | Performed by: SURGERY

## 2024-05-23 PROCEDURE — 87389 HIV-1 AG W/HIV-1&-2 AB AG IA: CPT | Mod: HCNC | Performed by: SURGERY

## 2024-05-23 PROCEDURE — 63600175 PHARM REV CODE 636 W HCPCS: Mod: HCNC,NTX | Performed by: SURGERY

## 2024-05-23 PROCEDURE — 25000003 PHARM REV CODE 250: Mod: HCNC | Performed by: STUDENT IN AN ORGANIZED HEALTH CARE EDUCATION/TRAINING PROGRAM

## 2024-05-23 PROCEDURE — 86803 HEPATITIS C AB TEST: CPT | Mod: HCNC | Performed by: SURGERY

## 2024-05-23 PROCEDURE — 27201423 OPTIME MED/SURG SUP & DEVICES STERILE SUPPLY: Mod: HCNC | Performed by: SURGERY

## 2024-05-23 PROCEDURE — 25000003 PHARM REV CODE 250: Mod: HCNC

## 2024-05-23 PROCEDURE — 80069 RENAL FUNCTION PANEL: CPT | Mod: 91,HCNC | Performed by: SURGERY

## 2024-05-23 PROCEDURE — 81100000 HC KIDNEY ACQUISITION-LIVE DONOR

## 2024-05-23 PROCEDURE — P9017 PLASMA 1 DONOR FRZ W/IN 8 HR: HCPCS | Mod: HCNC | Performed by: STUDENT IN AN ORGANIZED HEALTH CARE EDUCATION/TRAINING PROGRAM

## 2024-05-23 PROCEDURE — 27000207 HC ISOLATION: Mod: HCNC

## 2024-05-23 PROCEDURE — 36000930 HC OR TIME LEV VII 1ST 15 MIN: Mod: HCNC | Performed by: SURGERY

## 2024-05-23 PROCEDURE — 27000221 HC OXYGEN, UP TO 24 HOURS: Mod: HCNC

## 2024-05-23 PROCEDURE — 85025 COMPLETE CBC W/AUTO DIFF WBC: CPT | Mod: HCNC | Performed by: NURSE PRACTITIONER

## 2024-05-23 PROCEDURE — 5A0945A ASSISTANCE WITH RESPIRATORY VENTILATION, 24-96 CONSECUTIVE HOURS, HIGH NASAL FLOW/VELOCITY: ICD-10-PCS | Performed by: SURGERY

## 2024-05-23 PROCEDURE — 25000003 PHARM REV CODE 250: Mod: HCNC,NTX | Performed by: STUDENT IN AN ORGANIZED HEALTH CARE EDUCATION/TRAINING PROGRAM

## 2024-05-23 PROCEDURE — 50325 PREP DONOR RENAL GRAFT: CPT | Mod: 51,HCNC,LT, | Performed by: SURGERY

## 2024-05-23 PROCEDURE — 20600001 HC STEP DOWN PRIVATE ROOM: Mod: HCNC

## 2024-05-23 PROCEDURE — C2617 STENT, NON-COR, TEM W/O DEL: HCPCS | Mod: HCNC | Performed by: SURGERY

## 2024-05-23 PROCEDURE — 86901 BLOOD TYPING SEROLOGIC RH(D): CPT | Mod: HCNC | Performed by: TRANSPLANT SURGERY

## 2024-05-23 PROCEDURE — 71000033 HC RECOVERY, INTIAL HOUR: Mod: HCNC | Performed by: SURGERY

## 2024-05-23 PROCEDURE — 94761 N-INVAS EAR/PLS OXIMETRY MLT: CPT | Mod: HCNC

## 2024-05-23 PROCEDURE — 80069 RENAL FUNCTION PANEL: CPT | Mod: 91,HCNC | Performed by: NURSE PRACTITIONER

## 2024-05-23 PROCEDURE — 36620 INSERTION CATHETER ARTERY: CPT | Mod: 59,HCNC,, | Performed by: SURGERY

## 2024-05-23 PROCEDURE — D9220A PRA ANESTHESIA: Mod: AA,HCNC,, | Performed by: SURGERY

## 2024-05-23 PROCEDURE — 83036 HEMOGLOBIN GLYCOSYLATED A1C: CPT | Mod: HCNC | Performed by: TRANSPLANT SURGERY

## 2024-05-23 PROCEDURE — 27201037 HC PRESSURE MONITORING SET UP: Mod: HCNC,NTX

## 2024-05-23 PROCEDURE — 36415 COLL VENOUS BLD VENIPUNCTURE: CPT | Mod: HCNC | Performed by: NURSE PRACTITIONER

## 2024-05-23 PROCEDURE — 30233K1 TRANSFUSION OF NONAUTOLOGOUS FROZEN PLASMA INTO PERIPHERAL VEIN, PERCUTANEOUS APPROACH: ICD-10-PCS | Performed by: SURGERY

## 2024-05-23 PROCEDURE — 86704 HEP B CORE ANTIBODY TOTAL: CPT | Mod: HCNC | Performed by: SURGERY

## 2024-05-23 PROCEDURE — 25000003 PHARM REV CODE 250: Mod: HCNC,NTX | Performed by: SURGERY

## 2024-05-23 PROCEDURE — 36000931 HC OR TIME LEV VII EA ADD 15 MIN: Mod: HCNC | Performed by: SURGERY

## 2024-05-23 PROCEDURE — 37000008 HC ANESTHESIA 1ST 15 MINUTES: Mod: HCNC | Performed by: SURGERY

## 2024-05-23 PROCEDURE — S5010 5% DEXTROSE AND 0.45% SALINE: HCPCS | Mod: HCNC | Performed by: STUDENT IN AN ORGANIZED HEALTH CARE EDUCATION/TRAINING PROGRAM

## 2024-05-23 PROCEDURE — 71000016 HC POSTOP RECOV ADDL HR: Mod: HCNC | Performed by: SURGERY

## 2024-05-23 PROCEDURE — 87340 HEPATITIS B SURFACE AG IA: CPT | Mod: HCNC | Performed by: SURGERY

## 2024-05-23 PROCEDURE — 99900035 HC TECH TIME PER 15 MIN (STAT): Mod: HCNC

## 2024-05-23 PROCEDURE — 85014 HEMATOCRIT: CPT | Mod: HCNC | Performed by: STUDENT IN AN ORGANIZED HEALTH CARE EDUCATION/TRAINING PROGRAM

## 2024-05-23 PROCEDURE — 50605 INSERT URETERAL SUPPORT: CPT | Mod: 51,HCNC,LT, | Performed by: SURGERY

## 2024-05-23 PROCEDURE — 86706 HEP B SURFACE ANTIBODY: CPT | Mod: HCNC | Performed by: SURGERY

## 2024-05-23 DEVICE — IMPLANTABLE DEVICE
Type: IMPLANTABLE DEVICE | Site: URETER | Status: NON-FUNCTIONAL
Removed: 2024-06-12

## 2024-05-23 RX ORDER — PHENYLEPHRINE HYDROCHLORIDE 10 MG/ML
INJECTION INTRAVENOUS
Status: DISCONTINUED | OUTPATIENT
Start: 2024-05-23 | End: 2024-05-23

## 2024-05-23 RX ORDER — TRAMADOL HYDROCHLORIDE 50 MG/1
50 TABLET ORAL EVERY 6 HOURS PRN
Status: DISCONTINUED | OUTPATIENT
Start: 2024-05-23 | End: 2024-05-26

## 2024-05-23 RX ORDER — SODIUM CHLORIDE 9 MG/ML
INJECTION, SOLUTION INTRAVENOUS CONTINUOUS
Status: DISCONTINUED | OUTPATIENT
Start: 2024-05-23 | End: 2024-05-24

## 2024-05-23 RX ORDER — IBUPROFEN 200 MG
16 TABLET ORAL
Status: DISCONTINUED | OUTPATIENT
Start: 2024-05-23 | End: 2024-05-27

## 2024-05-23 RX ORDER — ACETAMINOPHEN 650 MG/20.3ML
650 LIQUID ORAL ONCE
Status: DISCONTINUED | OUTPATIENT
Start: 2024-05-23 | End: 2024-05-23

## 2024-05-23 RX ORDER — VALGANCICLOVIR 450 MG/1
450 TABLET, FILM COATED ORAL EVERY MORNING
Status: DISCONTINUED | OUTPATIENT
Start: 2024-06-02 | End: 2024-05-27 | Stop reason: HOSPADM

## 2024-05-23 RX ORDER — HEPARIN SODIUM 5000 [USP'U]/ML
5000 INJECTION, SOLUTION INTRAVENOUS; SUBCUTANEOUS EVERY 8 HOURS
Status: DISCONTINUED | OUTPATIENT
Start: 2024-05-23 | End: 2024-05-27 | Stop reason: HOSPADM

## 2024-05-23 RX ORDER — OXYCODONE HYDROCHLORIDE 5 MG/1
5 TABLET ORAL EVERY 6 HOURS PRN
Status: DISCONTINUED | OUTPATIENT
Start: 2024-05-23 | End: 2024-05-27 | Stop reason: HOSPADM

## 2024-05-23 RX ORDER — HALOPERIDOL 5 MG/ML
0.5 INJECTION INTRAMUSCULAR EVERY 10 MIN PRN
Status: DISCONTINUED | OUTPATIENT
Start: 2024-05-23 | End: 2024-05-23 | Stop reason: HOSPADM

## 2024-05-23 RX ORDER — ACETAMINOPHEN 325 MG/1
650 TABLET ORAL EVERY 8 HOURS
Status: DISPENSED | OUTPATIENT
Start: 2024-05-23 | End: 2024-05-25

## 2024-05-23 RX ORDER — FAMOTIDINE 20 MG/1
20 TABLET, FILM COATED ORAL NIGHTLY
Status: DISCONTINUED | OUTPATIENT
Start: 2024-05-23 | End: 2024-05-26

## 2024-05-23 RX ORDER — DIPHENHYDRAMINE HYDROCHLORIDE 50 MG/ML
INJECTION INTRAMUSCULAR; INTRAVENOUS
Status: DISCONTINUED | OUTPATIENT
Start: 2024-05-23 | End: 2024-05-23

## 2024-05-23 RX ORDER — CIPROFLOXACIN 2 MG/ML
400 INJECTION, SOLUTION INTRAVENOUS
Status: COMPLETED | OUTPATIENT
Start: 2024-05-23 | End: 2024-05-23

## 2024-05-23 RX ORDER — ACETAMINOPHEN 325 MG/1
650 TABLET ORAL ONCE
Status: DISCONTINUED | OUTPATIENT
Start: 2024-05-23 | End: 2024-05-23

## 2024-05-23 RX ORDER — METHYLPREDNISOLONE SOD SUCC 125 MG
125 VIAL (EA) INJECTION ONCE
Status: COMPLETED | OUTPATIENT
Start: 2024-05-25 | End: 2024-05-25

## 2024-05-23 RX ORDER — INSULIN ASPART 100 [IU]/ML
0-5 INJECTION, SOLUTION INTRAVENOUS; SUBCUTANEOUS
Status: DISCONTINUED | OUTPATIENT
Start: 2024-05-23 | End: 2024-05-24

## 2024-05-23 RX ORDER — HEPARIN SODIUM 10000 [USP'U]/ML
INJECTION, SOLUTION INTRAVENOUS; SUBCUTANEOUS
Status: DISCONTINUED | OUTPATIENT
Start: 2024-05-23 | End: 2024-05-23

## 2024-05-23 RX ORDER — SODIUM CHLORIDE 0.9 % (FLUSH) 0.9 %
10 SYRINGE (ML) INJECTION
Status: DISCONTINUED | OUTPATIENT
Start: 2024-05-23 | End: 2024-05-27 | Stop reason: HOSPADM

## 2024-05-23 RX ORDER — BUPIVACAINE HYDROCHLORIDE 2.5 MG/ML
INJECTION, SOLUTION EPIDURAL; INFILTRATION; INTRACAUDAL
Status: DISCONTINUED | OUTPATIENT
Start: 2024-05-23 | End: 2024-05-23

## 2024-05-23 RX ORDER — BISACODYL 5 MG
10 TABLET, DELAYED RELEASE (ENTERIC COATED) ORAL NIGHTLY
Status: DISCONTINUED | OUTPATIENT
Start: 2024-05-23 | End: 2024-05-27 | Stop reason: HOSPADM

## 2024-05-23 RX ORDER — HYDROMORPHONE HYDROCHLORIDE 1 MG/ML
0.2 INJECTION, SOLUTION INTRAMUSCULAR; INTRAVENOUS; SUBCUTANEOUS EVERY 5 MIN PRN
Status: COMPLETED | OUTPATIENT
Start: 2024-05-23 | End: 2024-05-23

## 2024-05-23 RX ORDER — DIPHENHYDRAMINE HCL 25 MG
50 CAPSULE ORAL ONCE AS NEEDED
Status: COMPLETED | OUTPATIENT
Start: 2024-05-23 | End: 2024-05-24

## 2024-05-23 RX ORDER — DIPHENHYDRAMINE HCL 25 MG
50 CAPSULE ORAL ONCE AS NEEDED
Status: COMPLETED | OUTPATIENT
Start: 2024-05-23 | End: 2024-05-25

## 2024-05-23 RX ORDER — MUPIROCIN 20 MG/G
1 OINTMENT TOPICAL 2 TIMES DAILY
Status: DISCONTINUED | OUTPATIENT
Start: 2024-05-23 | End: 2024-05-27 | Stop reason: HOSPADM

## 2024-05-23 RX ORDER — MYCOPHENOLATE MOFETIL 250 MG/1
1000 CAPSULE ORAL 2 TIMES DAILY
Status: DISCONTINUED | OUTPATIENT
Start: 2024-05-23 | End: 2024-05-27 | Stop reason: HOSPADM

## 2024-05-23 RX ORDER — ACETAMINOPHEN 325 MG/1
650 TABLET ORAL ONCE AS NEEDED
Status: DISCONTINUED | OUTPATIENT
Start: 2024-05-23 | End: 2024-05-23

## 2024-05-23 RX ORDER — DIPHENHYDRAMINE HYDROCHLORIDE 50 MG/ML
50 INJECTION INTRAMUSCULAR; INTRAVENOUS ONCE
Status: DISCONTINUED | OUTPATIENT
Start: 2024-05-23 | End: 2024-05-23

## 2024-05-23 RX ORDER — SODIUM CHLORIDE 0.9 % (FLUSH) 0.9 %
10 SYRINGE (ML) INJECTION
Status: DISCONTINUED | OUTPATIENT
Start: 2024-05-23 | End: 2024-05-23

## 2024-05-23 RX ORDER — FENTANYL CITRATE 50 UG/ML
INJECTION, SOLUTION INTRAMUSCULAR; INTRAVENOUS
Status: DISCONTINUED | OUTPATIENT
Start: 2024-05-23 | End: 2024-05-23

## 2024-05-23 RX ORDER — METHYLPREDNISOLONE SOD SUCC 125 MG
250 VIAL (EA) INJECTION ONCE
Status: COMPLETED | OUTPATIENT
Start: 2024-05-24 | End: 2024-05-24

## 2024-05-23 RX ORDER — LIDOCAINE HYDROCHLORIDE 20 MG/ML
INJECTION, SOLUTION EPIDURAL; INFILTRATION; INTRACAUDAL; PERINEURAL
Status: DISCONTINUED | OUTPATIENT
Start: 2024-05-23 | End: 2024-05-23

## 2024-05-23 RX ORDER — KETAMINE HCL IN 0.9 % NACL 50 MG/5 ML
SYRINGE (ML) INTRAVENOUS
Status: DISCONTINUED | OUTPATIENT
Start: 2024-05-23 | End: 2024-05-23

## 2024-05-23 RX ORDER — PROPOFOL 10 MG/ML
VIAL (ML) INTRAVENOUS
Status: DISCONTINUED | OUTPATIENT
Start: 2024-05-23 | End: 2024-05-23

## 2024-05-23 RX ORDER — ONDANSETRON HYDROCHLORIDE 2 MG/ML
4 INJECTION, SOLUTION INTRAVENOUS EVERY 6 HOURS PRN
Status: DISCONTINUED | OUTPATIENT
Start: 2024-05-23 | End: 2024-05-27 | Stop reason: HOSPADM

## 2024-05-23 RX ORDER — CEFAZOLIN SODIUM 1 G/3ML
INJECTION, POWDER, FOR SOLUTION INTRAMUSCULAR; INTRAVENOUS
Status: DISCONTINUED | OUTPATIENT
Start: 2024-05-23 | End: 2024-05-23

## 2024-05-23 RX ORDER — TRAZODONE HYDROCHLORIDE 50 MG/1
50 TABLET ORAL NIGHTLY PRN
Status: DISCONTINUED | OUTPATIENT
Start: 2024-05-24 | End: 2024-05-27 | Stop reason: HOSPADM

## 2024-05-23 RX ORDER — DEXTROSE MONOHYDRATE AND SODIUM CHLORIDE 5; .45 G/100ML; G/100ML
INJECTION, SOLUTION INTRAVENOUS CONTINUOUS
Status: DISCONTINUED | OUTPATIENT
Start: 2024-05-23 | End: 2024-05-24

## 2024-05-23 RX ORDER — ACETAMINOPHEN 325 MG/1
650 TABLET ORAL
Status: DISCONTINUED | OUTPATIENT
Start: 2024-05-23 | End: 2024-05-23

## 2024-05-23 RX ORDER — DIPHENHYDRAMINE HCL 25 MG
25 CAPSULE ORAL
Status: COMPLETED | OUTPATIENT
Start: 2024-05-24 | End: 2024-05-25

## 2024-05-23 RX ORDER — MANNITOL 250 MG/ML
INJECTION, SOLUTION INTRAVENOUS
Status: DISCONTINUED | OUTPATIENT
Start: 2024-05-23 | End: 2024-05-23

## 2024-05-23 RX ORDER — DIPHENHYDRAMINE HCL 25 MG
25 CAPSULE ORAL ONCE
Status: DISCONTINUED | OUTPATIENT
Start: 2024-05-23 | End: 2024-05-27

## 2024-05-23 RX ORDER — GLUCAGON 1 MG
1 KIT INJECTION CONTINUOUS PRN
Status: DISCONTINUED | OUTPATIENT
Start: 2024-05-23 | End: 2024-05-27

## 2024-05-23 RX ORDER — EPINEPHRINE 1 MG/ML
1 INJECTION, SOLUTION, CONCENTRATE INTRAVENOUS ONCE AS NEEDED
Status: DISCONTINUED | OUTPATIENT
Start: 2024-05-23 | End: 2024-05-27

## 2024-05-23 RX ORDER — PREDNISONE 20 MG/1
20 TABLET ORAL DAILY
Status: DISCONTINUED | OUTPATIENT
Start: 2024-05-26 | End: 2024-05-27 | Stop reason: HOSPADM

## 2024-05-23 RX ORDER — ACETAMINOPHEN 325 MG/1
650 TABLET ORAL
Status: COMPLETED | OUTPATIENT
Start: 2024-05-24 | End: 2024-05-25

## 2024-05-23 RX ORDER — MUPIROCIN 20 MG/G
OINTMENT TOPICAL
Status: DISCONTINUED | OUTPATIENT
Start: 2024-05-23 | End: 2024-05-23

## 2024-05-23 RX ORDER — DOCUSATE SODIUM 100 MG/1
100 CAPSULE, LIQUID FILLED ORAL 3 TIMES DAILY
Status: DISCONTINUED | OUTPATIENT
Start: 2024-05-23 | End: 2024-05-27 | Stop reason: HOSPADM

## 2024-05-23 RX ORDER — FUROSEMIDE 10 MG/ML
INJECTION INTRAMUSCULAR; INTRAVENOUS
Status: DISCONTINUED | OUTPATIENT
Start: 2024-05-23 | End: 2024-05-23

## 2024-05-23 RX ORDER — LEVOTHYROXINE SODIUM 100 UG/1
200 TABLET ORAL
Status: DISCONTINUED | OUTPATIENT
Start: 2024-05-24 | End: 2024-05-27 | Stop reason: HOSPADM

## 2024-05-23 RX ORDER — ROCURONIUM BROMIDE 10 MG/ML
INJECTION, SOLUTION INTRAVENOUS
Status: DISCONTINUED | OUTPATIENT
Start: 2024-05-23 | End: 2024-05-23

## 2024-05-23 RX ORDER — IBUPROFEN 200 MG
24 TABLET ORAL
Status: DISCONTINUED | OUTPATIENT
Start: 2024-05-23 | End: 2024-05-27

## 2024-05-23 RX ORDER — ONDANSETRON HYDROCHLORIDE 2 MG/ML
INJECTION, SOLUTION INTRAVENOUS
Status: DISCONTINUED | OUTPATIENT
Start: 2024-05-23 | End: 2024-05-23

## 2024-05-23 RX ORDER — HYDROCODONE BITARTRATE AND ACETAMINOPHEN 500; 5 MG/1; MG/1
TABLET ORAL
Status: DISCONTINUED | OUTPATIENT
Start: 2024-05-23 | End: 2024-05-27

## 2024-05-23 RX ORDER — TACROLIMUS 1 MG/1
3 CAPSULE ORAL 2 TIMES DAILY
Status: DISCONTINUED | OUTPATIENT
Start: 2024-05-23 | End: 2024-05-24

## 2024-05-23 RX ORDER — ESCITALOPRAM OXALATE 10 MG/1
10 TABLET ORAL NIGHTLY
Status: DISCONTINUED | OUTPATIENT
Start: 2024-05-23 | End: 2024-05-27 | Stop reason: HOSPADM

## 2024-05-23 RX ORDER — HEPARIN SODIUM 5000 [USP'U]/ML
5000 INJECTION, SOLUTION INTRAVENOUS; SUBCUTANEOUS ONCE
Status: COMPLETED | OUTPATIENT
Start: 2024-05-23 | End: 2024-05-23

## 2024-05-23 RX ADMIN — HYDROMORPHONE HYDROCHLORIDE 0.2 MG: 1 INJECTION, SOLUTION INTRAMUSCULAR; INTRAVENOUS; SUBCUTANEOUS at 03:05

## 2024-05-23 RX ADMIN — FAMOTIDINE 20 MG: 20 TABLET ORAL at 08:05

## 2024-05-23 RX ADMIN — SODIUM CHLORIDE: 0.9 INJECTION, SOLUTION INTRAVENOUS at 08:05

## 2024-05-23 RX ADMIN — PHENYLEPHRINE HYDROCHLORIDE 100 MCG: 10 INJECTION INTRAVENOUS at 12:05

## 2024-05-23 RX ADMIN — SODIUM CHLORIDE, SODIUM ACETATE ANHYDROUS, SODIUM GLUCONATE, POTASSIUM CHLORIDE, AND MAGNESIUM CHLORIDE: 526; 222; 502; 37; 30 INJECTION, SOLUTION INTRAVENOUS at 09:05

## 2024-05-23 RX ADMIN — DOCUSATE SODIUM 100 MG: 100 CAPSULE, LIQUID FILLED ORAL at 08:05

## 2024-05-23 RX ADMIN — MANNITOL 25 G: 250 INJECTION, SOLUTION INTRAVENOUS at 12:05

## 2024-05-23 RX ADMIN — HEPARIN SODIUM 5000 UNITS: 5000 INJECTION INTRAVENOUS; SUBCUTANEOUS at 06:05

## 2024-05-23 RX ADMIN — PROPOFOL 160 MG: 10 INJECTION, EMULSION INTRAVENOUS at 09:05

## 2024-05-23 RX ADMIN — HALOPERIDOL LACTATE 0.5 MG: 5 INJECTION, SOLUTION INTRAMUSCULAR at 02:05

## 2024-05-23 RX ADMIN — Medication 30 MG: at 09:05

## 2024-05-23 RX ADMIN — OXYCODONE 5 MG: 5 TABLET ORAL at 03:05

## 2024-05-23 RX ADMIN — MUPIROCIN: 20 OINTMENT TOPICAL at 06:05

## 2024-05-23 RX ADMIN — HYDROMORPHONE HYDROCHLORIDE 0.2 MG: 1 INJECTION, SOLUTION INTRAMUSCULAR; INTRAVENOUS; SUBCUTANEOUS at 02:05

## 2024-05-23 RX ADMIN — DEXTROSE AND SODIUM CHLORIDE: 5; 450 INJECTION, SOLUTION INTRAVENOUS at 04:05

## 2024-05-23 RX ADMIN — FUROSEMIDE 100 MG: 10 INJECTION, SOLUTION INTRAMUSCULAR; INTRAVENOUS at 12:05

## 2024-05-23 RX ADMIN — ROCURONIUM BROMIDE 20 MG: 10 INJECTION, SOLUTION INTRAVENOUS at 11:05

## 2024-05-23 RX ADMIN — Medication 10 MG: at 11:05

## 2024-05-23 RX ADMIN — FENTANYL CITRATE 100 MCG: 50 INJECTION, SOLUTION INTRAMUSCULAR; INTRAVENOUS at 09:05

## 2024-05-23 RX ADMIN — ROCURONIUM BROMIDE 50 MG: 10 INJECTION, SOLUTION INTRAVENOUS at 09:05

## 2024-05-23 RX ADMIN — HEPARIN SODIUM 5000 UNITS: 5000 INJECTION INTRAVENOUS; SUBCUTANEOUS at 08:05

## 2024-05-23 RX ADMIN — VANCOMYCIN HYDROCHLORIDE 1500 MG: 1.5 INJECTION, POWDER, LYOPHILIZED, FOR SOLUTION INTRAVENOUS at 06:05

## 2024-05-23 RX ADMIN — Medication 10 MG: at 10:05

## 2024-05-23 RX ADMIN — FENTANYL CITRATE 25 MCG: 50 INJECTION, SOLUTION INTRAMUSCULAR; INTRAVENOUS at 02:05

## 2024-05-23 RX ADMIN — DIPHENHYDRAMINE HYDROCHLORIDE 50 MG: 50 INJECTION, SOLUTION INTRAMUSCULAR; INTRAVENOUS at 09:05

## 2024-05-23 RX ADMIN — SODIUM CHLORIDE: 9 INJECTION, SOLUTION INTRAVENOUS at 04:05

## 2024-05-23 RX ADMIN — BISACODYL 10 MG: 5 TABLET, COATED ORAL at 08:05

## 2024-05-23 RX ADMIN — ACETAMINOPHEN 650 MG: 325 TABLET ORAL at 06:05

## 2024-05-23 RX ADMIN — DEXTROSE 500 MG: 50 INJECTION, SOLUTION INTRAVENOUS at 09:05

## 2024-05-23 RX ADMIN — ROCURONIUM BROMIDE 30 MG: 10 INJECTION, SOLUTION INTRAVENOUS at 10:05

## 2024-05-23 RX ADMIN — ANTI-THYMOCYTE GLOBULIN (RABBIT) 125 MG: 5 INJECTION, POWDER, LYOPHILIZED, FOR SOLUTION INTRAVENOUS at 11:05

## 2024-05-23 RX ADMIN — INSULIN ASPART 1 UNITS: 100 INJECTION, SOLUTION INTRAVENOUS; SUBCUTANEOUS at 10:05

## 2024-05-23 RX ADMIN — ACETAMINOPHEN 650 MG: 325 TABLET ORAL at 08:05

## 2024-05-23 RX ADMIN — PHENYLEPHRINE HYDROCHLORIDE 0.3 MCG/KG/MIN: 10 INJECTION INTRAVENOUS at 12:05

## 2024-05-23 RX ADMIN — ONDANSETRON 4 MG: 2 INJECTION INTRAMUSCULAR; INTRAVENOUS at 02:05

## 2024-05-23 RX ADMIN — CIPROFLOXACIN 400 MG: 2 INJECTION, SOLUTION INTRAVENOUS at 09:05

## 2024-05-23 RX ADMIN — SUGAMMADEX 200 MG: 100 INJECTION, SOLUTION INTRAVENOUS at 02:05

## 2024-05-23 RX ADMIN — TRAZODONE HYDROCHLORIDE 50 MG: 50 TABLET ORAL at 11:05

## 2024-05-23 RX ADMIN — MYCOPHENOLATE MOFETIL 1000 MG: 250 CAPSULE ORAL at 08:05

## 2024-05-23 RX ADMIN — SODIUM CHLORIDE: 9 INJECTION, SOLUTION INTRAVENOUS at 06:05

## 2024-05-23 RX ADMIN — CIPROFLOXACIN 400 MG: 2 INJECTION, SOLUTION INTRAVENOUS at 07:05

## 2024-05-23 RX ADMIN — TACROLIMUS 3 MG: 1 CAPSULE ORAL at 06:05

## 2024-05-23 RX ADMIN — MUPIROCIN 1 G: 20 OINTMENT TOPICAL at 08:05

## 2024-05-23 RX ADMIN — TRAMADOL HYDROCHLORIDE 50 MG: 50 TABLET, COATED ORAL at 06:05

## 2024-05-23 RX ADMIN — ESCITALOPRAM OXALATE 10 MG: 10 TABLET ORAL at 08:05

## 2024-05-23 RX ADMIN — OXYCODONE 5 MG: 5 TABLET ORAL at 08:05

## 2024-05-23 RX ADMIN — DOCUSATE SODIUM 100 MG: 100 CAPSULE, LIQUID FILLED ORAL at 03:05

## 2024-05-23 RX ADMIN — PHENYLEPHRINE HYDROCHLORIDE 50 MCG: 10 INJECTION INTRAVENOUS at 12:05

## 2024-05-23 RX ADMIN — LIDOCAINE HYDROCHLORIDE 100 MG: 20 INJECTION, SOLUTION EPIDURAL; INFILTRATION; INTRACAUDAL; PERINEURAL at 09:05

## 2024-05-23 RX ADMIN — FENTANYL CITRATE 50 MCG: 50 INJECTION, SOLUTION INTRAMUSCULAR; INTRAVENOUS at 02:05

## 2024-05-23 NOTE — OP NOTE
Operative Report    Date of Procedure: 5/23/2024  Date of Transplant (UNOS): 5/23/24    Surgeons:  Surgeons and Role:     * Stepan Bernardo MD - Primary     * Phani Rice MD - Resident - Assisting     * Phani Armando MD - Fellow    First Assistant Attestation:  The indicated fellow functioned as first assistant for the entire case.    Pre-operative Diagnosis: ESRD, requiring chronic dialysis secondary to Diabetes Mellitus - Type II  Post-operative Diagnosis: Same    Procedure(s) Performed:   Back Table Preparation of Left Kidney     Living Kidney transplant    Anesthesia: General endotracheal    Preamble  Indications and Patient Counseling: The patient is a 64 y.o. year-old female with end-stage kidney disease secondary to Diabetes Mellitus - Type II who has been evaluated for a kidney transplant.  The procedure was thoroughly discussed with the patient, including potential risks, complications, and alternatives.  Specific complications mentioned included death, graft non-function, bleeding, infection, and rejection, as well as the possibility of other complications not specifically mentioned.    Donor Risk Factors: Prior to the operation, the patient was advised of any donor-specific risk factors requiring specific disclosure.  Factors in this case included nothing that required specific disclosure.      Specific [facility] donor risk criteria for the organ donor include:  None    All questions were answered, the patient voiced appropriate understanding, and she agreed to proceed with the planned procedure.    ABO Confirmation: Immediately following arrival of the donor organ and prior to implantation, a formal ABO confirmation was done according to hospital and UNOS policies.  I confirmed the UNOS ID number ([id number]) of the donor organ and the donor and recipient ABO types, directly verifying these data by comparison with the UNOS Match Run report ( ).  This confirmation was personally done by an attending surgeon  and circulating nurse, and is officially documented elsewhere.    Time-Out: A complete time out was carried out prior to incision, with confirmation of patient identity, correct procedure, correct operative site, appropriate antibiotic prophylaxis, review of any known allergies, and presence of all needed equipment.    Procedure in Detail  Prior to starting the operation, the left kidney  was prepared on the back table. Arterial anatomy was double. Venous anatomy was single. Ureteral anatomy was single. Back table vascular reconstruction was not required  .  Unneeded fat was removed from the kidney, the vessels were cleaned of adherent tissue and tested for leaks, and the kidney was maintained at ice temperature in organ preservation solution until it was brought to the operative field.     The patient had previously been reviewed by hematology and our surgical team - we gave the patient one unit of FFP prior to commencing the case.    The patient was brought into the operating room and placed in a supine position on the OR table.  After the induction of general endotracheal anesthesia, lines were placed by the anesthesiologist.  The urinary bladder was catheterized and irrigated with antibiotic solution.  There was no tension on the axillae and all pressure points were padded.  Sequential compression boots were used as were Antoni Huggers.  The abdomen was prepped and draped in the usual sterile fashion.  Skin was incised over the right with a knife and deepened with electrocautery.  The peritoneum and its contents were swept medially, exposing the right external iliac artery and the right external iliac vein.  The Bookwalter retractor was used to provide exposure.  Overlying lymphatics were ligated or cauterized and the vessels were dissected free for a length compatible with anastomosis.  The kidney was brought to the OR table at 5/23/2024 12:05 PM.  Venous control was obtained with a vascular clamp.  A venotomy  was made, the vein irrigated, and an donor renal vein to right external iliac vein anastomosis was created with 5-0 polypropylene.  Arterial control was obtained with a vascular clamp.  Arteriotomy was made, the artery irrigated, and an pair of pants donor renal artery with accessory donor renal artery to right external iliac artery anastomosis was created with 6-0 polypropylene. Special care was taken to insure all lumens were patent. The kidney was unclamped and reperfused at 5/23/2024 12:33 PM.  Reperfusion quality was good. Intraoperative urine production was observed.  After hemostasis was obtained, a Lich uretero-neocystostomy was created.  The bladder was filled and identified, opened, and the anastomosis created using 6-0 PDS.  The bladder muscle was closed over the distal ureter to create an antireflux tunnel.  A ureteral stent was used.  With the kidney well perfused and sitting appropriately without tension on the anastomoses, viscera were replaced in their usual position.  The wound was closed after a final check for hemostasis.  Overall, the graft quality was assessed to be good. At the end of the case the needle, sponge and instrument counts were all correct.      A formal ultrasound was performed to evaluate arterial inflow and venous outflow of the allograft.  With the skin closed. There was good inflow and outflow.      Sterile dressings were applied and the patient was brought to the recovery room/ICU in good condition.    Estimated Blood Loss: 150 mL  Fluids Administered:   Crystalloid (mL) 1,500      Drains: 19f Jignesh drains x 1  Specimens: none    Findings:    Organ Transplanted: Left Kidney    Arterial Anatomy: double  Number of Arteries: 2  Configuration of Multiple Arteries: side to side (pair of pants)  Venous Anatomy: single  Number of Veins: 1  Ureteral Anatomy: single  Number of Ureters: 1  Reperfusion Quality: good  Overall Graft Quality: good  Intraoperative Urine Production:  yes  Sheth: not to be removed before 2 days.  Ureteral Stent: Yes    Ischemic Times:   Anastomosis (warm ischemia) time: 28 minutes   Cold ischemia time: 60 minutes  Total ischemia time: 88 minutes    Donor Data:  UNOS ID: [id number]    UNOS Match Run:      Donor Type: Living    Donor CMV Status:      Donor HBcAB:      Donor HCV Status:

## 2024-05-23 NOTE — PROGRESS NOTES
Patient arrived to PACU with 20G PIV Right Forearm WNL with antithymoctye globulin (Rabbit) infusing. 18G PIV Right Forearm removed per MD Curly (PIV no longer patent). Anesthesia Mds remain at bedside attempting PIV access with US. IVF not started due to loss of PIV access.

## 2024-05-23 NOTE — ANESTHESIA PROCEDURE NOTES
Arterial    Diagnosis: ESRD    Patient location during procedure: done in OR    Staffing  Authorizing Provider: Varun Morelos MD  Performing Provider: Steven Rawls MD    Staffing  Performed by: Steven Rawls MD  Authorized by: Varun Morelos MD    Anesthesiologist was present at the time of the procedure.    Preanesthetic Checklist  Completed: patient identified, IV checked, site marked, risks and benefits discussed, surgical consent, monitors and equipment checked, pre-op evaluation, timeout performed and anesthesia consent givenArterial  Skin Prep: chlorhexidine gluconate  Local Infiltration: none  Orientation: right  Location: radial    Catheter Size: 20 G  Catheter placement by Ultrasound guidance. Heme positive aspiration all ports.   Vessel Caliber: medium, patent, compressibility normal  Vascular Doppler:  not done  Needle advanced into vessel with real time Ultrasound guidance.  Guidewire confirmed in vessel.Insertion Attempts: 1  Assessment  Dressing: secured with tape and tegaderm  Patient: Tolerated well

## 2024-05-23 NOTE — TRANSFER OF CARE
"Anesthesia Transfer of Care Note    Patient: Kathie Quezada    Procedure(s) Performed: Procedure(s) (LRB):  TRANSPLANT, KIDNEY (N/A)    Patient location: PACU    Anesthesia Type: general    Transport from OR: Transported from OR on 6-10 L/min O2 by face mask with adequate spontaneous ventilation    Post pain: adequate analgesia    Post assessment: no apparent anesthetic complications and tolerated procedure well    Post vital signs: stable    Level of consciousness: awake and alert    Nausea/Vomiting: no nausea/vomiting    Complications: none    Transfer of care protocol was followed      Last vitals: Visit Vitals  /67 (BP Location: Right leg, Patient Position: Lying)   Pulse 79   Temp 36.8 °C (98.2 °F) (Temporal)   Resp 19   Ht 5' 9" (1.753 m)   Wt 106.6 kg (235 lb)   SpO2 100%   Breastfeeding No   BMI 34.70 kg/m²     "

## 2024-05-23 NOTE — PROGRESS NOTES
TRANSPLANT NOTE:      ORGAN: LEFT KIDNEY      Disease Etiology: Diabetes Mellitus - Type II  Donor Type: Living      [facility] High Risk: No      Donor CMV Status:  positive   Donor HBcAB:  negative  Donor HCV Status:  negative   Peak cPRA % (within 1 year of transplant): 67  Recipient HBcAB positive, HCV Ab positive       Kathie Quezada is a 64 y.o. female s/p  Living     kidney transplant on 5/23/2024 (Kidney) for Diabetes Mellitus - Type II.   This patient will receive 3 doses of Thymoglobulin for induction.  This patients maintenance immunosuppression will include a steroid taper per protocol to 5mg daily, Prograf, and Cellcept maintenance.  Opportunistic infection prophylaxis will include Valcyte for 3 months (CMV D + , R - ) and Bactrim for 6 months.  Patient is to begin self medications upon transfer to the [facility], and I plan to meet with this patient and his/her support person prior to discharge to review the medication section of the Kidney Transplant Education Manual.  I have reviewed the pre-op medications and have restarted those, as appropriate.

## 2024-05-23 NOTE — NURSING TRANSFER
Nursing Transfer Note      5/23/2024   4:45 PM    Reason patient is being transferred: Recovery criteria met    PACU nurse giving handoff: ALIVIA Roa    Nurse receiving handoff: ALIVIA Dejesus    Transfer to [id number]    Transfer via bed    Transfer with O2 @ 2LPM    Transported by RN X 2    Transfer Vital Signs @ 1630  Temperature: 99.1  Blood Pressure: 168/71  Heart Rate: 106 ST  O2 Sat: 99% on 2LPM  Respirations:20    Medicines/Equipment sent with patient:   antithymoctye globulin (Rabbit) infusing per R FA PIV (pump attached to bed)  D5 1/2 NS & NS IVF infusing via L foot PIV (pumps attached to pole)    Any special needs or follow-up needed: Routine    Patient belongings transferred with patient: No    Chart send with patient: Yes    Notified: DaughterMike (phone call and text)    Patient reassessed prior to transfer at: 5/23/24 @ 1630

## 2024-05-23 NOTE — PROGRESS NOTES
Patient is very difficult IV stick. MD Curly & MD Rusty attempted multiple IV's in RUE with different catheters and using ultrasound to no avail. MD made decision to place 20G PIV in Left Foot. MD aware that patient is diabetic. MD states if this IV access is lost, patient will most likely need a central line placed. Patient tolerated IV sticks well. Will initiate IVF in left foot IV. Antithymoctye globulin (Rabbit) remains infusing to RUE PIV.

## 2024-05-23 NOTE — NURSING
Pt transferred from PACU. VSS, afebrile, on 2 L NC. Pt w/o complaints of pain. NS (@ 350 cc/hr) & D51/2 NS (@ 50 cc/hr) infusing to R FA PIV. Thymo infusing @ 83.3 cc/hr to L foot PIV. Midline consult placed. Sheth intact, draining clear yellow UOP. Bed in lowest position, wheels locked, call light within pt reach. Pt updated on plan of care. Family @ bedside. SHILPI Lopez DNP, notified of transfer.

## 2024-05-23 NOTE — ANESTHESIA PROCEDURE NOTES
Intubation    Date/Time: 5/23/2024 9:15 AM    Performed by: Steven Rawls MD  Authorized by: Varun Morelos MD    Intubation:     Induction:  Intravenous    Intubated:  Postinduction    Mask Ventilation:  Easy mask    Attempts:  1    Attempted By:  Student (Varun Vilchis)    Method of Intubation:  Video laryngoscopy    Blade:  Alvarez 3    Laryngeal View Grade: Grade I - full view of cords      Difficult Airway Encountered?: No      Complications:  None    Airway Device:  Oral endotracheal tube    Airway Device Size:  7.0    Style/Cuff Inflation:  Cuffed    Inflation Amount (mL):  6    Tube secured:  22    Secured at:  The lips    Placement Verified By:  Capnometry and Revisualization with laryngoscopy    Complicating Factors:  Obesity    Findings Post-Intubation:  BS equal bilateral and atraumatic/condition of teeth unchanged

## 2024-05-24 DIAGNOSIS — Z94.0 KIDNEY REPLACED BY TRANSPLANT: ICD-10-CM

## 2024-05-24 DIAGNOSIS — Z94.0 KIDNEY REPLACED BY TRANSPLANT: Primary | ICD-10-CM

## 2024-05-24 PROBLEM — D62 ACUTE BLOOD LOSS ANEMIA: Status: ACTIVE | Noted: 2024-05-24

## 2024-05-24 PROBLEM — Z79.60 LONG-TERM USE OF IMMUNOSUPPRESSANT MEDICATION: Status: ACTIVE | Noted: 2024-05-24

## 2024-05-24 PROBLEM — D63.8 ANEMIA OF CHRONIC DISEASE: Status: ACTIVE | Noted: 2023-08-01

## 2024-05-24 PROBLEM — E83.51 HYPOCALCEMIA: Status: ACTIVE | Noted: 2024-05-24

## 2024-05-24 PROBLEM — Z99.2 ESRD ON HEMODIALYSIS: Chronic | Status: ACTIVE | Noted: 2022-09-20

## 2024-05-24 PROBLEM — T38.0X5A ADRENAL CORTICAL STEROIDS CAUSING ADVERSE EFFECT IN THERAPEUTIC USE: Status: ACTIVE | Noted: 2024-05-24

## 2024-05-24 PROBLEM — Z29.89 PROPHYLACTIC IMMUNOTHERAPY: Status: ACTIVE | Noted: 2024-05-24

## 2024-05-24 PROBLEM — Z01.818 PRE-TRANSPLANT EVALUATION FOR KIDNEY TRANSPLANT: Status: RESOLVED | Noted: 2023-08-31 | Resolved: 2024-05-24

## 2024-05-24 PROBLEM — N18.6 ESRD ON HEMODIALYSIS: Chronic | Status: ACTIVE | Noted: 2022-09-20

## 2024-05-24 PROBLEM — Z99.2 ESRD ON HEMODIALYSIS: Status: RESOLVED | Noted: 2022-09-20 | Resolved: 2024-05-24

## 2024-05-24 PROBLEM — Z76.82 ORGAN TRANSPLANT CANDIDATE: Status: RESOLVED | Noted: 2023-04-03 | Resolved: 2024-05-24

## 2024-05-24 PROBLEM — N18.6 ESRD ON HEMODIALYSIS: Status: RESOLVED | Noted: 2022-09-20 | Resolved: 2024-05-24

## 2024-05-24 LAB
ALBUMIN SERPL BCP-MCNC: 2.5 G/DL (ref 3.5–5.2)
ANION GAP SERPL CALC-SCNC: 9 MMOL/L (ref 8–16)
ANION GAP SERPL CALC-SCNC: 9 MMOL/L (ref 8–16)
BASOPHILS # BLD AUTO: 0.01 K/UL (ref 0–0.2)
BASOPHILS # BLD AUTO: 0.02 K/UL (ref 0–0.2)
BASOPHILS NFR BLD: 0.1 % (ref 0–1.9)
BASOPHILS NFR BLD: 0.1 % (ref 0–1.9)
BLD PROD TYP BPU: NORMAL
BLD PROD TYP BPU: NORMAL
BLOOD UNIT EXPIRATION DATE: NORMAL
BLOOD UNIT EXPIRATION DATE: NORMAL
BLOOD UNIT TYPE CODE: 7300
BLOOD UNIT TYPE CODE: 7300
BLOOD UNIT TYPE: NORMAL
BLOOD UNIT TYPE: NORMAL
BUN SERPL-MCNC: 38 MG/DL (ref 8–23)
BUN SERPL-MCNC: 40 MG/DL (ref 8–23)
CALCIUM SERPL-MCNC: 6.6 MG/DL (ref 8.7–10.5)
CALCIUM SERPL-MCNC: 7.4 MG/DL (ref 8.7–10.5)
CHLORIDE SERPL-SCNC: 107 MMOL/L (ref 95–110)
CHLORIDE SERPL-SCNC: 108 MMOL/L (ref 95–110)
CO2 SERPL-SCNC: 21 MMOL/L (ref 23–29)
CO2 SERPL-SCNC: 23 MMOL/L (ref 23–29)
CODING SYSTEM: NORMAL
CODING SYSTEM: NORMAL
CREAT SERPL-MCNC: 3.2 MG/DL (ref 0.5–1.4)
CREAT SERPL-MCNC: 4.2 MG/DL (ref 0.5–1.4)
CROSSMATCH INTERPRETATION: NORMAL
CROSSMATCH INTERPRETATION: NORMAL
DIFFERENTIAL METHOD BLD: ABNORMAL
DIFFERENTIAL METHOD BLD: ABNORMAL
DISPENSE STATUS: NORMAL
DISPENSE STATUS: NORMAL
EOSINOPHIL # BLD AUTO: 0 K/UL (ref 0–0.5)
EOSINOPHIL # BLD AUTO: 0 K/UL (ref 0–0.5)
EOSINOPHIL NFR BLD: 0 % (ref 0–8)
EOSINOPHIL NFR BLD: 0 % (ref 0–8)
ERYTHROCYTE [DISTWIDTH] IN BLOOD BY AUTOMATED COUNT: 17.3 % (ref 11.5–14.5)
ERYTHROCYTE [DISTWIDTH] IN BLOOD BY AUTOMATED COUNT: 17.4 % (ref 11.5–14.5)
EST. GFR  (NO RACE VARIABLE): 11.4 ML/MIN/1.73 M^2
EST. GFR  (NO RACE VARIABLE): 15.8 ML/MIN/1.73 M^2
GLUCOSE SERPL-MCNC: 120 MG/DL (ref 70–110)
GLUCOSE SERPL-MCNC: 85 MG/DL (ref 70–110)
HCT VFR BLD AUTO: 31.7 % (ref 37–48.5)
HCT VFR BLD AUTO: 32 % (ref 37–48.5)
HGB BLD-MCNC: 9.5 G/DL (ref 12–16)
HGB BLD-MCNC: 9.5 G/DL (ref 12–16)
IMM GRANULOCYTES # BLD AUTO: 0.05 K/UL (ref 0–0.04)
IMM GRANULOCYTES # BLD AUTO: 0.09 K/UL (ref 0–0.04)
IMM GRANULOCYTES NFR BLD AUTO: 0.4 % (ref 0–0.5)
IMM GRANULOCYTES NFR BLD AUTO: 0.5 % (ref 0–0.5)
LYMPHOCYTES # BLD AUTO: 0.1 K/UL (ref 1–4.8)
LYMPHOCYTES # BLD AUTO: 0.2 K/UL (ref 1–4.8)
LYMPHOCYTES NFR BLD: 0.4 % (ref 18–48)
LYMPHOCYTES NFR BLD: 1.3 % (ref 18–48)
MAGNESIUM SERPL-MCNC: 1.7 MG/DL (ref 1.6–2.6)
MCH RBC QN AUTO: 24.9 PG (ref 27–31)
MCH RBC QN AUTO: 25.3 PG (ref 27–31)
MCHC RBC AUTO-ENTMCNC: 29.7 G/DL (ref 32–36)
MCHC RBC AUTO-ENTMCNC: 30 G/DL (ref 32–36)
MCV RBC AUTO: 84 FL (ref 82–98)
MCV RBC AUTO: 85 FL (ref 82–98)
MONOCYTES # BLD AUTO: 1.2 K/UL (ref 0.3–1)
MONOCYTES # BLD AUTO: 1.3 K/UL (ref 0.3–1)
MONOCYTES NFR BLD: 7.2 % (ref 4–15)
MONOCYTES NFR BLD: 8.9 % (ref 4–15)
NEUTROPHILS # BLD AUTO: 12.1 K/UL (ref 1.8–7.7)
NEUTROPHILS # BLD AUTO: 16.9 K/UL (ref 1.8–7.7)
NEUTROPHILS NFR BLD: 89.3 % (ref 38–73)
NEUTROPHILS NFR BLD: 91.8 % (ref 38–73)
NRBC BLD-RTO: 0 /100 WBC
NRBC BLD-RTO: 0 /100 WBC
NUM UNITS TRANS FFP: NORMAL
NUM UNITS TRANS FFP: NORMAL
PHOSPHATE SERPL-MCNC: 3.9 MG/DL (ref 2.7–4.5)
PLATELET # BLD AUTO: 108 K/UL (ref 150–450)
PLATELET # BLD AUTO: 113 K/UL (ref 150–450)
PMV BLD AUTO: 11.3 FL (ref 9.2–12.9)
PMV BLD AUTO: 13 FL (ref 9.2–12.9)
POCT GLUCOSE: 113 MG/DL (ref 70–110)
POCT GLUCOSE: 121 MG/DL (ref 70–110)
POCT GLUCOSE: 147 MG/DL (ref 70–110)
POCT GLUCOSE: 172 MG/DL (ref 70–110)
POCT GLUCOSE: 209 MG/DL (ref 70–110)
POCT GLUCOSE: 235 MG/DL (ref 70–110)
POTASSIUM SERPL-SCNC: 4.6 MMOL/L (ref 3.5–5.1)
POTASSIUM SERPL-SCNC: 4.7 MMOL/L (ref 3.5–5.1)
RBC # BLD AUTO: 3.75 M/UL (ref 4–5.4)
RBC # BLD AUTO: 3.81 M/UL (ref 4–5.4)
SODIUM SERPL-SCNC: 137 MMOL/L (ref 136–145)
SODIUM SERPL-SCNC: 140 MMOL/L (ref 136–145)
TACROLIMUS BLD-MCNC: <2 NG/ML (ref 5–15)
WBC # BLD AUTO: 13.5 K/UL (ref 3.9–12.7)
WBC # BLD AUTO: 18.38 K/UL (ref 3.9–12.7)

## 2024-05-24 PROCEDURE — 85025 COMPLETE CBC W/AUTO DIFF WBC: CPT | Mod: 91,HCNC | Performed by: PHYSICIAN ASSISTANT

## 2024-05-24 PROCEDURE — 36415 COLL VENOUS BLD VENIPUNCTURE: CPT | Mod: HCNC | Performed by: STUDENT IN AN ORGANIZED HEALTH CARE EDUCATION/TRAINING PROGRAM

## 2024-05-24 PROCEDURE — 63600175 PHARM REV CODE 636 W HCPCS: Mod: HCNC

## 2024-05-24 PROCEDURE — 25000003 PHARM REV CODE 250: Mod: HCNC | Performed by: PHYSICIAN ASSISTANT

## 2024-05-24 PROCEDURE — 99233 SBSQ HOSP IP/OBS HIGH 50: CPT | Mod: 24,HCNC,, | Performed by: PHYSICIAN ASSISTANT

## 2024-05-24 PROCEDURE — 63600175 PHARM REV CODE 636 W HCPCS: Mod: HCNC | Performed by: STUDENT IN AN ORGANIZED HEALTH CARE EDUCATION/TRAINING PROGRAM

## 2024-05-24 PROCEDURE — P9017 PLASMA 1 DONOR FRZ W/IN 8 HR: HCPCS | Mod: HCNC | Performed by: STUDENT IN AN ORGANIZED HEALTH CARE EDUCATION/TRAINING PROGRAM

## 2024-05-24 PROCEDURE — 27000207 HC ISOLATION: Mod: HCNC

## 2024-05-24 PROCEDURE — 80048 BASIC METABOLIC PNL TOTAL CA: CPT | Mod: HCNC | Performed by: PHYSICIAN ASSISTANT

## 2024-05-24 PROCEDURE — 25000003 PHARM REV CODE 250: Mod: HCNC | Performed by: STUDENT IN AN ORGANIZED HEALTH CARE EDUCATION/TRAINING PROGRAM

## 2024-05-24 PROCEDURE — 36415 COLL VENOUS BLD VENIPUNCTURE: CPT | Mod: HCNC | Performed by: PHYSICIAN ASSISTANT

## 2024-05-24 PROCEDURE — 85025 COMPLETE CBC W/AUTO DIFF WBC: CPT | Mod: HCNC | Performed by: STUDENT IN AN ORGANIZED HEALTH CARE EDUCATION/TRAINING PROGRAM

## 2024-05-24 PROCEDURE — 20600001 HC STEP DOWN PRIVATE ROOM: Mod: HCNC

## 2024-05-24 PROCEDURE — 80197 ASSAY OF TACROLIMUS: CPT | Mod: HCNC | Performed by: STUDENT IN AN ORGANIZED HEALTH CARE EDUCATION/TRAINING PROGRAM

## 2024-05-24 PROCEDURE — 80069 RENAL FUNCTION PANEL: CPT | Mod: HCNC | Performed by: STUDENT IN AN ORGANIZED HEALTH CARE EDUCATION/TRAINING PROGRAM

## 2024-05-24 PROCEDURE — 25000003 PHARM REV CODE 250: Mod: HCNC | Performed by: TRANSPLANT SURGERY

## 2024-05-24 PROCEDURE — S5010 5% DEXTROSE AND 0.45% SALINE: HCPCS | Mod: HCNC | Performed by: STUDENT IN AN ORGANIZED HEALTH CARE EDUCATION/TRAINING PROGRAM

## 2024-05-24 PROCEDURE — 25000003 PHARM REV CODE 250: Mod: JZ,JG,HCNC | Performed by: PHYSICIAN ASSISTANT

## 2024-05-24 PROCEDURE — 99222 1ST HOSP IP/OBS MODERATE 55: CPT | Mod: HCNC,,,

## 2024-05-24 PROCEDURE — 83735 ASSAY OF MAGNESIUM: CPT | Mod: HCNC | Performed by: STUDENT IN AN ORGANIZED HEALTH CARE EDUCATION/TRAINING PROGRAM

## 2024-05-24 PROCEDURE — 63600175 PHARM REV CODE 636 W HCPCS: Mod: HCNC | Performed by: PHYSICIAN ASSISTANT

## 2024-05-24 RX ORDER — MYCOPHENOLATE MOFETIL 250 MG/1
1000 CAPSULE ORAL 2 TIMES DAILY
Qty: 240 CAPSULE | Refills: 11 | Status: SHIPPED | OUTPATIENT
Start: 2024-05-24 | End: 2025-05-24

## 2024-05-24 RX ORDER — ATOVAQUONE 750 MG/5ML
1500 SUSPENSION ORAL DAILY
Status: DISCONTINUED | OUTPATIENT
Start: 2024-05-24 | End: 2024-05-27 | Stop reason: HOSPADM

## 2024-05-24 RX ORDER — INSULIN ASPART 100 [IU]/ML
0-5 INJECTION, SOLUTION INTRAVENOUS; SUBCUTANEOUS
Status: DISCONTINUED | OUTPATIENT
Start: 2024-05-24 | End: 2024-05-25

## 2024-05-24 RX ORDER — ENOXAPARIN SODIUM 100 MG/ML
40 INJECTION SUBCUTANEOUS DAILY
Qty: 5.6 ML | Refills: 0 | Status: SHIPPED | OUTPATIENT
Start: 2024-05-24 | End: 2024-06-10

## 2024-05-24 RX ORDER — TACROLIMUS 1 MG/1
6 CAPSULE ORAL EVERY 12 HOURS
Qty: 360 CAPSULE | Refills: 11 | Status: SHIPPED | OUTPATIENT
Start: 2024-05-24 | End: 2024-06-04 | Stop reason: DRUGHIGH

## 2024-05-24 RX ORDER — IBUPROFEN 200 MG
16 TABLET ORAL
Status: DISCONTINUED | OUTPATIENT
Start: 2024-05-24 | End: 2024-05-27 | Stop reason: HOSPADM

## 2024-05-24 RX ORDER — DIPHENHYDRAMINE HCL 25 MG
25 CAPSULE ORAL EVERY 6 HOURS PRN
Status: DISCONTINUED | OUTPATIENT
Start: 2024-05-24 | End: 2024-05-27 | Stop reason: HOSPADM

## 2024-05-24 RX ORDER — INSULIN ASPART 100 [IU]/ML
3 INJECTION, SOLUTION INTRAVENOUS; SUBCUTANEOUS
Status: DISCONTINUED | OUTPATIENT
Start: 2024-05-24 | End: 2024-05-24

## 2024-05-24 RX ORDER — CALCIUM CARBONATE 200(500)MG
1000 TABLET,CHEWABLE ORAL 2 TIMES DAILY
Qty: 120 TABLET | Refills: 11 | Status: SHIPPED | OUTPATIENT
Start: 2024-05-24 | End: 2025-05-24

## 2024-05-24 RX ORDER — TACROLIMUS 1 MG/1
4 CAPSULE ORAL 2 TIMES DAILY
Status: DISCONTINUED | OUTPATIENT
Start: 2024-05-24 | End: 2024-05-27

## 2024-05-24 RX ORDER — CALCIUM CARBONATE 200(500)MG
500 TABLET,CHEWABLE ORAL 2 TIMES DAILY
Status: DISCONTINUED | OUTPATIENT
Start: 2024-05-24 | End: 2024-05-27 | Stop reason: HOSPADM

## 2024-05-24 RX ORDER — FAMOTIDINE 20 MG/1
20 TABLET, FILM COATED ORAL NIGHTLY
Qty: 30 TABLET | Refills: 0 | Status: SHIPPED | OUTPATIENT
Start: 2024-05-24 | End: 2025-05-24

## 2024-05-24 RX ORDER — INSULIN ASPART 100 [IU]/ML
4 INJECTION, SOLUTION INTRAVENOUS; SUBCUTANEOUS
Status: DISCONTINUED | OUTPATIENT
Start: 2024-05-24 | End: 2024-05-24

## 2024-05-24 RX ORDER — VALGANCICLOVIR 450 MG/1
900 TABLET, FILM COATED ORAL EVERY MORNING
Qty: 60 TABLET | Refills: 2 | Status: SHIPPED | OUTPATIENT
Start: 2024-05-23 | End: 2024-08-21

## 2024-05-24 RX ORDER — IBUPROFEN 200 MG
24 TABLET ORAL
Status: DISCONTINUED | OUTPATIENT
Start: 2024-05-24 | End: 2024-05-27 | Stop reason: HOSPADM

## 2024-05-24 RX ORDER — SODIUM CHLORIDE 9 MG/ML
INJECTION, SOLUTION INTRAVENOUS CONTINUOUS
Status: DISCONTINUED | OUTPATIENT
Start: 2024-05-24 | End: 2024-05-24

## 2024-05-24 RX ORDER — ATOVAQUONE 750 MG/5ML
1500 SUSPENSION ORAL DAILY
Qty: 300 ML | Refills: 5 | Status: SHIPPED | OUTPATIENT
Start: 2024-05-23 | End: 2024-11-19

## 2024-05-24 RX ORDER — CALCIUM GLUCONATE 20 MG/ML
1 INJECTION, SOLUTION INTRAVENOUS ONCE
Status: COMPLETED | OUTPATIENT
Start: 2024-05-24 | End: 2024-05-24

## 2024-05-24 RX ORDER — PREDNISONE 5 MG/1
TABLET ORAL
Qty: 70 TABLET | Refills: 11 | Status: SHIPPED | OUTPATIENT
Start: 2024-05-26

## 2024-05-24 RX ORDER — HYDROCODONE BITARTRATE AND ACETAMINOPHEN 500; 5 MG/1; MG/1
TABLET ORAL
Status: DISCONTINUED | OUTPATIENT
Start: 2024-05-24 | End: 2024-05-27

## 2024-05-24 RX ORDER — GLUCAGON 1 MG
1 KIT INJECTION
Status: DISCONTINUED | OUTPATIENT
Start: 2024-05-24 | End: 2024-05-27 | Stop reason: HOSPADM

## 2024-05-24 RX ADMIN — OXYCODONE 5 MG: 5 TABLET ORAL at 08:05

## 2024-05-24 RX ADMIN — DIPHENHYDRAMINE HYDROCHLORIDE 25 MG: 25 CAPSULE ORAL at 04:05

## 2024-05-24 RX ADMIN — SODIUM CHLORIDE: 9 INJECTION, SOLUTION INTRAVENOUS at 09:05

## 2024-05-24 RX ADMIN — MUPIROCIN 1 G: 20 OINTMENT TOPICAL at 08:05

## 2024-05-24 RX ADMIN — TRAZODONE HYDROCHLORIDE 50 MG: 50 TABLET ORAL at 08:05

## 2024-05-24 RX ADMIN — ACETAMINOPHEN 650 MG: 325 TABLET ORAL at 05:05

## 2024-05-24 RX ADMIN — CALCIUM GLUCONATE 1 G: 20 INJECTION, SOLUTION INTRAVENOUS at 11:05

## 2024-05-24 RX ADMIN — TRAMADOL HYDROCHLORIDE 50 MG: 50 TABLET, COATED ORAL at 09:05

## 2024-05-24 RX ADMIN — ANTI-THYMOCYTE GLOBULIN (RABBIT) 125 MG: 5 INJECTION, POWDER, LYOPHILIZED, FOR SOLUTION INTRAVENOUS at 02:05

## 2024-05-24 RX ADMIN — HEPARIN SODIUM 5000 UNITS: 5000 INJECTION INTRAVENOUS; SUBCUTANEOUS at 08:05

## 2024-05-24 RX ADMIN — HEPARIN SODIUM 5000 UNITS: 5000 INJECTION INTRAVENOUS; SUBCUTANEOUS at 05:05

## 2024-05-24 RX ADMIN — TACROLIMUS 4 MG: 1 CAPSULE ORAL at 05:05

## 2024-05-24 RX ADMIN — DIPHENHYDRAMINE HYDROCHLORIDE 50 MG: 25 CAPSULE ORAL at 05:05

## 2024-05-24 RX ADMIN — CALCIUM CARBONATE (ANTACID) CHEW TAB 500 MG 500 MG: 500 CHEW TAB at 08:05

## 2024-05-24 RX ADMIN — DEXTROSE AND SODIUM CHLORIDE: 5; 450 INJECTION, SOLUTION INTRAVENOUS at 08:05

## 2024-05-24 RX ADMIN — MYCOPHENOLATE MOFETIL 1000 MG: 250 CAPSULE ORAL at 08:05

## 2024-05-24 RX ADMIN — SODIUM CHLORIDE: 9 INJECTION, SOLUTION INTRAVENOUS at 02:05

## 2024-05-24 RX ADMIN — ACETAMINOPHEN 650 MG: 325 TABLET ORAL at 01:05

## 2024-05-24 RX ADMIN — OXYCODONE 5 MG: 5 TABLET ORAL at 06:05

## 2024-05-24 RX ADMIN — ACETAMINOPHEN 650 MG: 325 TABLET ORAL at 08:05

## 2024-05-24 RX ADMIN — HEPARIN SODIUM 5000 UNITS: 5000 INJECTION INTRAVENOUS; SUBCUTANEOUS at 02:05

## 2024-05-24 RX ADMIN — OXYCODONE 5 MG: 5 TABLET ORAL at 12:05

## 2024-05-24 RX ADMIN — ATOVAQUONE 1500 MG: 750 SUSPENSION ORAL at 11:05

## 2024-05-24 RX ADMIN — LEVOTHYROXINE SODIUM 200 MCG: 100 TABLET ORAL at 05:05

## 2024-05-24 RX ADMIN — DOCUSATE SODIUM 100 MG: 100 CAPSULE, LIQUID FILLED ORAL at 02:05

## 2024-05-24 RX ADMIN — TRAMADOL HYDROCHLORIDE 50 MG: 50 TABLET, COATED ORAL at 02:05

## 2024-05-24 RX ADMIN — DOCUSATE SODIUM 100 MG: 100 CAPSULE, LIQUID FILLED ORAL at 08:05

## 2024-05-24 RX ADMIN — THERA TABS 1 TABLET: TAB at 08:05

## 2024-05-24 RX ADMIN — BISACODYL 10 MG: 5 TABLET, COATED ORAL at 08:05

## 2024-05-24 RX ADMIN — ESCITALOPRAM OXALATE 10 MG: 10 TABLET ORAL at 08:05

## 2024-05-24 RX ADMIN — DIPHENHYDRAMINE HYDROCHLORIDE 25 MG: 25 CAPSULE ORAL at 01:05

## 2024-05-24 RX ADMIN — FAMOTIDINE 20 MG: 20 TABLET ORAL at 08:05

## 2024-05-24 RX ADMIN — INSULIN ASPART 1 UNITS: 100 INJECTION, SOLUTION INTRAVENOUS; SUBCUTANEOUS at 08:05

## 2024-05-24 RX ADMIN — CALCIUM CARBONATE (ANTACID) CHEW TAB 500 MG 500 MG: 500 CHEW TAB at 11:05

## 2024-05-24 RX ADMIN — SODIUM CHLORIDE: 9 INJECTION, SOLUTION INTRAVENOUS at 08:05

## 2024-05-24 RX ADMIN — TACROLIMUS 3 MG: 1 CAPSULE ORAL at 08:05

## 2024-05-24 RX ADMIN — METHYLPREDNISOLONE SODIUM SUCCINATE 250 MG: 125 INJECTION, POWDER, FOR SOLUTION INTRAMUSCULAR; INTRAVENOUS at 01:05

## 2024-05-24 NOTE — ANESTHESIA POSTPROCEDURE EVALUATION
Anesthesia Post Evaluation    Patient: Kathie Quezada    Procedure(s) Performed: Procedure(s) (LRB):  TRANSPLANT, KIDNEY (N/A)    Final Anesthesia Type: general      Patient location during evaluation: PACU  Patient participation: Yes- Able to Participate  Level of consciousness: awake and alert  Post-procedure vital signs: reviewed and stable  Pain management: adequate  Airway patency: patent  SERA mitigation strategies: Preoperative use of mandibular advancement devices or oral appliances, Intraoperative administration of CPAP, nasopharyngeal airway, or oral appliance during sedation, Multimodal analgesia, Extubation while patient is awake and Verification of full reversal of neuromuscular block  PONV status at discharge: No PONV  Anesthetic complications: no      Cardiovascular status: blood pressure returned to baseline, hemodynamically stable and stable  Respiratory status: unassisted and spontaneous ventilation  Hydration status: euvolemic  Follow-up not needed.              Vitals Value Taken Time   /59 05/24/24 0601   Temp 37.1 °C (98.8 °F) 05/24/24 0400   Pulse 81 05/24/24 0711   Resp 16 05/24/24 0619   SpO2 93 % 05/24/24 0711   Vitals shown include unfiled device data.      Event Time   Out of Recovery 15:00:00         Pain/Neda Score: Pain Rating Prior to Med Admin: 7 (5/24/2024  6:19 AM)  Pain Rating Post Med Admin: 2 (5/24/2024  3:25 AM)  Neda Score: 9 (5/23/2024  3:00 PM)

## 2024-05-24 NOTE — SUBJECTIVE & OBJECTIVE
Interval HPI:   No acute events overnight. Patient in room 27207/86407 A. Blood glucose stable. BG at and above goal on current insulin regimen (SSI ). Steroid use- Methylprednisolone  250 mg.   1 Day Post-Op  Renal function-   Lab Results   Component Value Date    CREATININE 4.2 (H) 2024        Vasopressors-  None     Diet Low Potassium     Eatin%  Nausea: No  Hypoglycemia and intervention: No  Fever: No  TPN and/or TF: No    PMH, PSH, FH, SH updated and reviewed     ROS:  Review of Systems   Constitutional:  Negative for unexpected weight change.   Gastrointestinal:  Negative for constipation, diarrhea, nausea and vomiting.   Endocrine: Negative for polydipsia and polyuria.       Current Medications and/or Treatments Impacting Glycemic Control  Immunotherapy:    Immunosuppressants           Stop Route Frequency     antithymocyte globulin (rabbit) 125 mg, hydrocortisone sodium succinate (SOLU-CORTEF) 20 mg in sodium chloride 0.9% 500 mL (FOR PERIPHERAL LINE ADMINISTRATION ONLY)         24 1159 IV Daily     mycophenolate capsule 1,000 mg         -- Oral 2 times daily     tacrolimus capsule 3 mg         -- Oral 2 times daily     antithymocyte globulin (rabbit) 125 mg, hydrocortisone sodium succinate (SOLU-CORTEF) 20 mg in sodium chloride 0.9% 500 mL (FOR PERIPHERAL LINE ADMINISTRATION ONLY)         -- IV Once          Steroids:   Hormones (From admission, onward)      Start     Stop Route Frequency Ordered    24 0900  predniSONE tablet 20 mg  (IP TXP KIDNEY POST-OP THYMO WITH PERIPHERAL PRECHECKED)         -- Oral Daily 24 1416    24 1100  methylPREDNISolone sodium succinate injection 125 mg  (IP TXP KIDNEY POST-OP THYMO WITH PERIPHERAL PRECHECKED)         -- IV Once 24 1416    24 1200  antithymocyte globulin (rabbit) 125 mg, hydrocortisone sodium succinate (SOLU-CORTEF) 20 mg in sodium chloride 0.9% 500 mL (FOR PERIPHERAL LINE ADMINISTRATION ONLY)  (IP TXP KIDNEY  POST-OP THYMO WITH PERIPHERAL PRECHECKED)         05/26/24 1159 IV Daily 05/23/24 1416    05/24/24 1100  methylPREDNISolone sodium succinate injection 250 mg  (IP TXP KIDNEY POST-OP THYMO WITH PERIPHERAL PRECHECKED)         -- IV Once 05/23/24 1416    05/23/24 1515  hydrocortisone sodium succinate injection 100 mg  (IP TXP KIDNEY POST-OP THYMO WITH PERIPHERAL PRECHECKED)         10/20/35 0615 IV Once as needed 05/23/24 1416    05/23/24 1045  antithymocyte globulin (rabbit) 125 mg, hydrocortisone sodium succinate (SOLU-CORTEF) 20 mg in sodium chloride 0.9% 500 mL (FOR PERIPHERAL LINE ADMINISTRATION ONLY)         -- IV Once 05/23/24 0938    05/23/24 1045  methylPREDNISolone sodium succinate (SOLU-MEDROL) 533 mg in dextrose 5 % (D5W) 100 mL IVPB  (methylPREDNISolone)         -- IV Once 05/23/24 0938    05/23/24 1037  hydrocortisone sodium succinate injection 100 mg         10/20/35 0137 IV Once as needed 05/23/24 0938          Pressors:    Autonomic Drugs (From admission, onward)      Start     Stop Route Frequency Ordered    05/23/24 1515  EPINEPHrine (PF) injection 1 mg  (IP TXP KIDNEY POST-OP THYMO WITH PERIPHERAL PRECHECKED)         10/20/35 0615 SubQ Once as needed 05/23/24 1416    05/23/24 1037  EPINEPHrine (PF) injection 1 mg         10/20/35 0137 SubQ Once as needed 05/23/24 0938          Hyperglycemia/Diabetes Medications:   Antihyperglycemics (From admission, onward)      Start     Stop Route Frequency Ordered    05/24/24 0944  insulin aspart U-100 pen 0-5 Units         -- SubQ Before meals & nightly PRN 05/24/24 0844             PHYSICAL EXAMINATION:  Vitals:    05/24/24 0800   BP: 125/61   Pulse: 84   Resp:    Temp:      Body mass index is 34.7 kg/m².     Physical Exam  Vitals reviewed.   Constitutional:       Appearance: Normal appearance.   HENT:      Head: Normocephalic.   Neurological:      General: No focal deficit present.      Mental Status: She is alert and oriented to person, place, and time.    Psychiatric:         Mood and Affect: Mood normal.         Behavior: Behavior normal.         Thought Content: Thought content normal.

## 2024-05-24 NOTE — ASSESSMENT & PLAN NOTE
- Continue prograf, cellcept, steroids. Monitor prograf level daily, monitor for toxic side effects, and adjust for therapeutic dose

## 2024-05-24 NOTE — ASSESSMENT & PLAN NOTE
- 1 unit of FFP given in OR  - Plan for another 1 unit today POD#1  - Will receive standard DVT prophylaxis while inpatient then prophylaxis lovenox x 2 weeks on discharge.

## 2024-05-24 NOTE — HPI
Ms. 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 She has a history of CVA in 2017 without any deficit. She also has factor XII deficiency, history of hep C (s/p treatment with harvoni. Seen by hepatology with fibroscan performed showing F0-F1), and pHTN (RHC performed on 6/7/23 and found to have group 2 HTN. TTE and cardiac PET with preserved EF)

## 2024-05-24 NOTE — DISCHARGE SUMMARY
[facility] - Transplant Stepdown  Kidney Transplant  Discharge Summary    Patient Name: Kathie Quezada  MRN: [id number]  Admission Date: 5/23/2024  Hospital Length of Stay: 4 days  Discharge Date and Time:  05/27/2024 11:27 AM  Attending Physician: Stepan Bernardo MD   Discharging Provider: Armando Vu NP  Primary Care Provider: Elan Price MD    HPI:   Ms. 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 She has a history of CVA in 2017 without any deficit. She also has factor XII deficiency, history of hep C (s/p treatment with harvoni. Seen by hepatology with fibroscan performed showing F0-F1), and pHTN (RHC performed on 6/7/23 and found to have group 2 HTN. TTE and cardiac PET with preserved EF)    Procedure(s) (LRB):  TRANSPLANT, KIDNEY (N/A)     Hospital Course:    She is now s/p living related kidney transplant on 5/23/24. Surgery without complication. 2 arteries reconstructed side-to-side. 2 day mera, MARIXA x 1. She progressed well post operatively. She is making large amount of urine with renal clearance. Creatinine 1.2 at time of discharge. She is tolerating diet, passing flatus and having bowel movements. Incisional pain well controlled.     Due to factor XII deficiency she received 1 unit of FFP in OR and 1 unit of FFP on POD#1. She received standard DVT prophylaxis while inpatient and was transitioned to prophylaxis lovenox x 2 weeks on discharge.     On day of discharge, patient feeling well. Incision clean, dry, intact with king in place. MARIXA drain removed on 5/27 prior to discharge. Mera removed on POD#2 with no issues voiding post removal. Pt to follow-up outpt with Dr. Tavera for perm cath removal. She is stable and ready for discharge. She will follow up with labs on 5/28/24. Patient verbalized understanding prior to discharge.     Drain removed:  Site  cleaned with alcohol, suture holding drain to skin cut. Suction released from drain and Drain intact at time of removal. Patient tolerated procedure well.  Will continue to monitor for any complications.     Goals of Care Treatment Preferences:  Code Status: Full Code          What is most important right now is to focus on extending life as long as possible, even it it means sacrificing quality.  Accordingly, we have decided that the best plan to meet the patient's goals includes continuing with treatment.      Final Active Diagnoses:    Diagnosis Date Noted POA    PRINCIPAL PROBLEM:  S/P kidney transplant [Z94.0] 05/24/2024 Not Applicable    Long-term use of immunosuppressant medication [Z79.60] 05/24/2024 Not Applicable    Prophylactic immunotherapy [Z29.89] 05/24/2024 Not Applicable    Acute blood loss anemia [D62] 05/24/2024 No    Adrenal cortical steroids causing adverse effect in therapeutic use [T38.0X5A] 05/24/2024 No    Anemia of chronic disease [D63.8] 08/01/2023 Yes    Controlled type 2 diabetes mellitus with stable proliferative retinopathy of both eyes, unspecified whether long term insulin use [E11.3553]  Yes     Chronic    Class 2 severe obesity due to excess calories with serious comorbidity and body mass index (BMI) of 35.0 to 35.9 in adult [E66.01, Z68.35] 05/13/2020 Not Applicable    Renovascular hypertension [I15.0] 04/09/2018 Yes     Chronic    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] 01/[birth year]/1978 Yes     Chronic      Problems Resolved During this Admission:    Diagnosis Date Noted Date Resolved POA    Hypocalcemia [E83.51] 05/24/2024 05/27/2024 Yes    ESRD on hemodialysis [N18.6, Z99.2] 09/20/2022 05/24/2024 Not Applicable       Treatments: As above    Consults (From admission, onward)          Status Ordering Provider     Inpatient consult to Midline team  Once        Provider:  (Not yet assigned)    WILLIS Snider     Inpatient consult to Endocrinology  Once        Provider:  (Not yet assigned)     Completed PATRICIA GOODE     Inpatient consult to Midline team  Once        Provider:  (Not yet assigned)    Completed KODAK HENDERSON     Inpatient consult to Registered Dietitian/Nutritionist  Once        Provider:  (Not yet assigned)    Completed DEJUAN BAY     Inpatient consult to Transplant Nephrology ([facility])  Once        Provider:  (Not yet assigned)    Acknowledged DEJUAN BAY            Pending Diagnostic Studies:       Procedure Component Value Units Date/Time    APTT [[id number]]     Order Status: Sent Lab Status: No result     Specimen: Blood     Protime-INR [[id number]]     Order Status: Sent Lab Status: No result     Specimen: Blood           Significant Diagnostic Studies: Labs: BMP:   Recent Labs   Lab 05/26/24  0515 05/27/24  0510   * 108    141   K 4.6 4.2   * 108   CO2 26 27   BUN 22 22   CREATININE 1.2 1.2   CALCIUM 8.9 9.0   MG 2.0 2.0    and CBC   Recent Labs   Lab 05/26/24  0515 05/27/24  0510   WBC 7.25 6.52   HGB 9.4* 9.4*   HCT 30.0* 29.6*   PLT 67* 55*       Discharged Condition: good    Disposition: Home or Self Care      Patient Instructions:      Diet diabetic     Notify your health care provider if you experience any of the following:  temperature >100.4     Notify your health care provider if you experience any of the following:  persistent nausea and vomiting or diarrhea     Notify your health care provider if you experience any of the following:  severe uncontrolled pain     Notify your health care provider if you experience any of the following:  redness, tenderness, or signs of infection (pain, swelling, redness, odor or green/yellow discharge around incision site)     Notify your health care provider if you experience any of the following:  difficulty breathing or increased cough     Notify your health care provider if you experience any of the following:  severe persistent headache     Notify your health care provider if you experience any of the  following:  worsening rash     Notify your health care provider if you experience any of the following:  persistent dizziness, light-headedness, or visual disturbances     Notify your health care provider if you experience any of the following:  increased confusion or weakness     Notify your health care provider if you experience any of the following:   Order Comments: For any concerning signs or symptoms. If you have not received your scheduled follow-up within 24 hours of your discharge or for any questions or concerns, please call [phone] for further assistance.     Activity as tolerated     Medications:  Reconciled Home Medications:      Medication List        START taking these medications      atovaquone 750 mg/5 mL Susp oral liquid  Commonly known as: MEPRON  Take 10 mLs (1,500 mg total) by mouth once daily. Stop 11/19/24     blood sugar diagnostic Strp  1 each by Misc.(Non-Drug; Combo Route) route 3 (three) times daily.     blood-glucose meter Misc  Use as instructed     calcium carbonate 200 mg calcium (500 mg) chewable tablet  Commonly known as: TUMS  CHEW 2 tablets (1,000 mg total) by mouth 2 (two) times daily.     carvediloL 6.25 MG tablet  Commonly known as: COREG  Take 1 tablet (6.25 mg total) by mouth 2 (two) times daily.     enoxaparin 40 mg/0.4 mL Syrg  Commonly known as: LOVENOX  Inject 0.4 mLs (40 mg total) into the skin once daily. Stop 6/7/24 for 14 days     famotidine 20 MG tablet  Commonly known as: PEPCID  Take 1 tablet (20 mg total) by mouth every evening.     insulin aspart U-100 100 unit/mL (3 mL) Inpn pen  Commonly known as: NovoLOG  Inject 3 Units into the skin 3 (three) times daily. + SSI (TDD: 24)     lancets Misc  1 each by Misc.(Non-Drug; Combo Route) route 3 (three) times daily.     methocarbamoL 500 MG Tab  Commonly known as: ROBAXIN  Take 1 tablet (500 mg total) by mouth 4 (four) times daily as needed (muscle spasms).     multivitamin Tab  Take 1 tablet by mouth once  "daily.     mycophenolate 250 mg Cap  Commonly known as: CELLCEPT  Take 4 capsules (1,000 mg total) by mouth 2 (two) times daily.     NIFEdipine 30 MG (OSM) 24 hr tablet  Commonly known as: PROCARDIA-XL  Take 1 tablet (30 mg total) by mouth once daily.  Start taking on: May 28, 2024  Replaces: NIFEdipine 60 MG Tbsr     oxyCODONE 5 MG immediate release tablet  Commonly known as: ROXICODONE  Take 1 tablet (5 mg total) by mouth every 6 (six) hours as needed for Pain.     pen needle, diabetic 32 gauge x 5/32" Ndle  1 each by Misc.(Non-Drug; Combo Route) route 3 (three) times daily.     predniSONE 5 MG tablet  Commonly known as: DELTASONE  Take by mouth daily; 5/26/2024-6/1/2024: 20 mg, 6/2/2024-6/8/2024: 15 mg; 6/9/2024-6/15/2024: 10 mg; 6/16/2024- forever: 5 mg; do not stop     tacrolimus 1 MG Cap  Commonly known as: PROGRAF  Take 6 capsules (6 mg total) by mouth every 12 (twelve) hours.     valGANciclovir 450 mg Tab  Commonly known as: VALCYTE  Take 2 tablets (900 mg total) by mouth every morning. Stop 8/21/24            CONTINUE taking these medications      cholecalciferol (vitamin D3) 125 mcg (5,000 unit) Tab  Take 1 tablet (5,000 Units total) by mouth once daily.     EScitalopram oxalate 10 MG tablet  Commonly known as: LEXAPRO  Take 1 tablet (10 mg total) by mouth every evening.     ezetimibe 10 mg tablet  Commonly known as: ZETIA  Take 1 tablet (10 mg total) by mouth once daily.     levocetirizine 5 MG tablet  Commonly known as: XYZAL  Take 1 tablet (5 mg total) by mouth daily as needed for Allergies.     SYNTHROID 200 mcg tablet  Generic drug: levothyroxine  Take 1 tablet (200 mcg total) by mouth before breakfast.     traZODone 50 MG tablet  Commonly known as: DESYREL  Take 1 to 2 tablets ( mg total) by mouth nightly as needed for Insomnia.            STOP taking these medications      calcium acetate(phosphat bind) 667 mg capsule  Commonly known as: PHOSLO     fentaNYL 50 mcg/mL injection  Commonly known " as: SUBLIMAZE     ferrous gluconate 324 MG tablet  Commonly known as: FERGON     heparin (porcine) 1,000 unit/mL injection     hydrALAZINE 50 MG tablet  Commonly known as: APRESOLINE     irbesartan 300 MG tablet  Commonly known as: AVAPRO     midazolam 1 mg/mL injection  Commonly known as: VERSED     NIFEdipine 60 MG Tbsr  Commonly known as: ADALAT CC  Replaced by: NIFEdipine 30 MG (OSM) 24 hr tablet     OMNIPAQUE 350 350 mg iodine/mL Soln injection  Generic drug: iohexoL            Time spent caring for patient (Greater than 1/2 spent in direct face-to-face contact): > 30 minutes    Armando Vu, AMY  Kidney Transplant  [facility] - Transplant Stepdown

## 2024-05-24 NOTE — HOSPITAL COURSE
She is now s/p living related kidney transplant on 5/23/34. Surgery without complication. 2 arteries reconstructed side-to-side. 2 day MARIXA mera x 1 to monitor for bleeding. Due to factor XII deficiency she received 1 unit of FFP in OR and will received 1 unit FFP POD#1. She will receive standard DVT prophylaxis while inpatient then prophylaxis lovenox x 2 weeks on discharge.     Interval History: No acute events overnight.  This AM, patient complains of worse incisional pain.  States she had to get up multiple times overnight to urinate and the extra movement may have made her pain worse.  Good urine output and Cr downtrending.  Passed flatus and had a BM.  Tolerating PO intake.

## 2024-05-24 NOTE — CONSULTS
"  [facility] - Transplant Stepdown  Adult Nutrition  Consult Note    SUMMARY     Recommendations    1. Continue Renal diet      2. If PO intake <50%, add Novasource renal BID      3. RD to monitor and follow    Goals: Meet % EEN, EPN by RD f/u  Nutrition Goal Status: new  Communication of RD Recs:  (POC)    Assessment and Plan    Nutrition Problem  Increased nutrient needs     Related to (etiology):   Increased demand for nutrient due to sx    Signs and Symptoms (as evidenced by):   S/p living kidney transplant (5/23).    Interventions/Recommendations (treatment strategy):  Collaboration with other providers  Nutrition education    Nutrition Diagnosis Status:   New     Reason for Assessment    Reason For Assessment: consult  Diagnosis:  (s/p kidney transplant)  Relevant Medical History: T2DM, anemia,  Interdisciplinary Rounds: did not attend    General Information Comments:   S/p living kidney transplant (5/23). Pt reports good appetite PTA. Tolerating diet. Stated was on low Na diet and fluid restriction PTA. Denies nausea, vomiting, chewing/swallowing difficulties. Stated  lb. Pt appears nourished. No indicator of malnutrition.     Nutrition Discharge Planning: Post transplant nutrition education provided on 5/24. Food safety/drug interactions emphasized. General healthy/low salt diet recommended. Education material left at bedside. No other needs identified.    Nutrition Risk Screen    Nutrition Risk Screen: no indicators present    Nutrition/Diet History    Spiritual, Cultural Beliefs, Anglican Practices, Values that Affect Care: no    Anthropometrics    Temp: 98.6 °F (37 °C)  Height Method: Stated  Height: 5' 9" (175.3 cm)  Height (inches): 69 in  Weight Method: Stated  Weight: 106.6 kg (235 lb)  Weight (lb): 235 lb  Ideal Body Weight (IBW), Female: 145 lb  % Ideal Body Weight, Female (lb): 162.07 %  BMI (Calculated): 34.7       Lab/Procedures/Meds    Pertinent Labs Reviewed: reviewed  Pertinent " Labs Comments: BUN 38, Cr 3.2, eGFR 15.8  Pertinent Medications Reviewed: reviewed  Pertinent Medications Comments: .   acetaminophen  650 mg Oral Q8H    acetaminophen  650 mg Oral Q24H    antithymocyte globulin (rabbit) 125 mg, hydrocortisone sodium succinate (SOLU-CORTEF) 20 mg in sodium chloride 0.9% 500 mL (FOR PERIPHERAL LINE ADMINISTRATION ONLY)  125 mg Intravenous Once    antithymocyte globulin (rabbit) 125 mg, hydrocortisone sodium succinate (SOLU-CORTEF) 20 mg in sodium chloride 0.9% 500 mL (FOR PERIPHERAL LINE ADMINISTRATION ONLY)  1.5 mg/kg (Adjusted) Intravenous Daily    atovaquone  1,500 mg Oral Daily    bisacodyL  10 mg Oral QHS    calcium carbonate  500 mg Oral BID    diphenhydrAMINE  25 mg Oral Once    diphenhydrAMINE  25 mg Oral Q24H    docusate sodium  100 mg Oral TID    EScitalopram oxalate  10 mg Oral QHS    famotidine  20 mg Oral QHS    heparin (porcine)  5,000 Units Subcutaneous Q8H    levothyroxine  200 mcg Oral Before breakfast    methylPREDNISolone sodium succinate injection  5 mg/kg Intravenous Once    [START ON 5/25/2024] methylPREDNISolone sodium succinate injection  125 mg Intravenous Once    multivitamin  1 tablet Oral Daily    mupirocin  1 g Nasal BID    mycophenolate  1,000 mg Oral BID    [START ON 5/26/2024] predniSONE  20 mg Oral Daily    tacrolimus  4 mg Oral BID    [START ON 6/2/2024] valGANciclovir  450 mg Oral Daily AM      sodium chloride 0.9%   Intravenous Continuous 75 mL/hr at 05/24/24 0944 New Bag at 05/24/24 0944    glucagon (human recombinant)  1 mg Intramuscular Continuous PRN         Estimated/Assessed Needs    Weight Used For Calorie Calculations: 106.6 kg (235 lb)  Energy Calorie Requirements (kcal): 1690 kcal  [place] Need [place] [facility]  Protein Requirements: 66-79g (1-1.2g/kg IBW)  Weight Used For Protein Calculations: 65.8 kg (145 lb) (IBW)  Fluid Requirements (mL): 1ml/kcal or per MD  Estimated Fluid Requirement Method: RDA Method  RDA Method (mL):  1690  CHO Requirement: 211g    Nutrition Prescription Ordered    Current Diet Order: Renal    Evaluation of Received Nutrient/Fluid Intake    I/O: + 9 L since admit  Energy Calories Required: meeting needs  Protein Required: meeting needs  Comments: LBM 5/22  Tolerance: tolerating    Nutrition Risk    Level of Risk/Frequency of Follow-up:  (1x/week)     Monitor and Evaluation    Food and Nutrient Intake: energy intake, food and beverage intake  Food and Nutrient Adminstration: diet order  Knowledge/Beliefs/Attitudes: food and nutrition knowledge/skill  Physical Activity and Function: nutrition-related ADLs and IADLs  Anthropometric Measurements: height/length, weight, weight change, body mass index  Biochemical Data, Medical Tests and Procedures: gastrointestinal profile, electrolyte and renal panel, glucose/endocrine profile, inflammatory profile, lipid profile  Nutrition-Focused Physical Findings: overall appearance     Nutrition Follow-Up    RD Follow-up?: Yes

## 2024-05-24 NOTE — SUBJECTIVE & OBJECTIVE
Subjective:   History of Present Illness:  Ms. 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 She has a history of CVA in 2017 without any deficit. She also has factor XII deficiency, history of hep C (s/p treatment with harvoni. Seen by hepatology with fibroscan performed showing F0-F1), and pHTN (RHC performed on 6/7/23 and found to have group 2 HTN. TTE and cardiac PET with preserved EF)    Ms. Quezada is a 64 y.o. year old female who is status post Kidney Transplant - 5/23/2024  (#1).  Her maintenance immunosuppression consists of:   Immunosuppressants (From admission, onward)      Start     Stop Route Frequency Ordered    05/24/24 1200  antithymocyte globulin (rabbit) 125 mg, hydrocortisone sodium succinate (SOLU-CORTEF) 20 mg in sodium chloride 0.9% 500 mL (FOR PERIPHERAL LINE ADMINISTRATION ONLY)  (IP TXP KIDNEY POST-OP THYMO WITH PERIPHERAL PRECHECKED)         05/26/24 1159 IV Daily 05/23/24 1416    05/23/24 2200  mycophenolate capsule 1,000 mg  (IP TXP KIDNEY POST-OP THYMO WITH PERIPHERAL PRECHECKED)         -- Oral 2 times daily 05/23/24 1416    05/23/24 1800  tacrolimus capsule 3 mg  (IP TXP KIDNEY POST-OP THYMO WITH PERIPHERAL PRECHECKED)         -- Oral 2 times daily 05/23/24 1416    05/23/24 1045  antithymocyte globulin (rabbit) 125 mg, hydrocortisone sodium succinate (SOLU-CORTEF) 20 mg in sodium chloride 0.9% 500 mL (FOR PERIPHERAL LINE ADMINISTRATION ONLY)         -- IV Once 05/23/24 0938                Hospital Course:  She is now s/p living related kidney transplant on 5/23/34. Surgery without complication. 2 arteries reconstructed side-to-side. 2 day MARIXA mera x 1 to monitor for bleeding. Due to factor XII deficiency she received 1 unit of FFP in OR and will received 1 unit FFP POD#1. She will receive standard DVT prophylaxis while inpatient then prophy lovenox x  2 weeks on discharge.     Interval History: No acute events overnight. Patient is feeling well this morning. She is making a large amount of urine with renal clearance. Drain x 1 bloody but thin. Plan for 1 unit of FFP today due to factor XII deficiency. Will repeat labs at noon. Pathway and place on IVF at 75 cc/hr x 8 hours. Encourage ambulation. Encourage IS.       Past Medical, Surgical, Family, and Social History:   Unchanged from H&P.    Scheduled Meds:   acetaminophen  650 mg Oral Q8H    acetaminophen  650 mg Oral Q24H    antithymocyte globulin (rabbit) 125 mg, hydrocortisone sodium succinate (SOLU-CORTEF) 20 mg in sodium chloride 0.9% 500 mL (FOR PERIPHERAL LINE ADMINISTRATION ONLY)  125 mg Intravenous Once    antithymocyte globulin (rabbit) 125 mg, hydrocortisone sodium succinate (SOLU-CORTEF) 20 mg in sodium chloride 0.9% 500 mL (FOR PERIPHERAL LINE ADMINISTRATION ONLY)  1.5 mg/kg (Adjusted) Intravenous Daily    bisacodyL  10 mg Oral QHS    diphenhydrAMINE  25 mg Oral Once    diphenhydrAMINE  25 mg Oral Q24H    docusate sodium  100 mg Oral TID    EScitalopram oxalate  10 mg Oral QHS    famotidine  20 mg Oral QHS    heparin (porcine)  5,000 Units Subcutaneous Q8H    levothyroxine  200 mcg Oral Before breakfast    methylPREDNISolone sodium succinate injection  5 mg/kg Intravenous Once    [START ON 5/25/2024] methylPREDNISolone sodium succinate injection  125 mg Intravenous Once    methylPREDNISolone sodium succinate injection  250 mg Intravenous Once    multivitamin  1 tablet Oral Daily    mupirocin  1 g Nasal BID    mycophenolate  1,000 mg Oral BID    [START ON 5/26/2024] predniSONE  20 mg Oral Daily    tacrolimus  3 mg Oral BID    [START ON 6/2/2024] valGANciclovir  450 mg Oral Daily AM     Continuous Infusions:   sodium chloride 0.9%   Intravenous Continuous        glucagon (human recombinant)  1 mg Intramuscular Continuous PRN         PRN Meds:  Current Facility-Administered Medications:     0.9%  NaCl  infusion (for blood administration), , Intravenous, Q24H PRN    0.9%  NaCl infusion (for blood administration), , Intravenous, Q24H PRN    dextrose 10%, 12.5 g, Intravenous, PRN    dextrose 10%, 12.5 g, Intravenous, PRN    dextrose 10%, 25 g, Intravenous, PRN    dextrose 10%, 25 g, Intravenous, PRN    diphenhydrAMINE, 25 mg, Oral, Q6H PRN    diphenhydrAMINE, 50 mg, Oral, Once PRN    EPINEPHrine (PF), 1 mg, Subcutaneous, Once PRN    EPINEPHrine (PF), 1 mg, Subcutaneous, Once PRN    glucagon (human recombinant), 1 mg, Intramuscular, Continuous PRN    glucagon (human recombinant), 1 mg, Intramuscular, PRN    glucose, 16 g, Oral, PRN    glucose, 16 g, Oral, PRN    glucose, 16 g, Oral, PRN    glucose, 24 g, Oral, PRN    glucose, 24 g, Oral, PRN    hydrocortisone sodium succinate, 100 mg, Intravenous, Once PRN    hydrocortisone sodium succinate, 100 mg, Intravenous, Once PRN    insulin aspart U-100, 0-5 Units, Subcutaneous, QID (AC + HS) PRN    ondansetron, 4 mg, Intravenous, Q6H PRN    oxyCODONE, 5 mg, Oral, Q6H PRN    sodium chloride 0.9%, 10 mL, Intravenous, PRN    traMADoL, 50 mg, Oral, Q6H PRN    traZODone, 50 mg, Oral, Nightly PRN    Intake/Output - Last 3 Shifts         05/22 0700 05/23 0659 05/23 0700 05/24 0659 05/24 0700 05/25 0659    P.O.  1160     I.V. (mL/kg)  2018 (18.9)     Blood  7966.7     IV Piggyback  2218.9     Total Intake(mL/kg)  36873.6 (125.4)     Urine (mL/kg/hr)  3930 (1.5) 300 (1.1)    Drains  180 50    Total Output  4110 350    Net  +9253.6 -350                    Review of Systems   Constitutional:  Negative for activity change and appetite change.   Respiratory:  Negative for shortness of breath.    Gastrointestinal:  Positive for abdominal pain. Negative for abdominal distention, nausea and vomiting.   Genitourinary:  Negative for decreased urine volume and hematuria.   Allergic/Immunologic: Positive for immunocompromised state.   Psychiatric/Behavioral:  Negative for confusion and  "decreased concentration.       Objective:     Vital Signs (Most Recent):  Temp: 99 °F (37.2 °C) (05/24/24 0730)  Pulse: 84 (05/24/24 0800)  Resp: 14 (05/24/24 0730)  BP: 125/61 (05/24/24 0800)  SpO2: 97 % (05/24/24 0800) Vital Signs (24h Range):  Temp:  [98.2 °F (36.8 °C)-99.5 °F (37.5 °C)] 99 °F (37.2 °C)  Pulse:  [] 84  Resp:  [14-23] 14  SpO2:  [96 %-100 %] 97 %  BP: (110-180)/(53-75) 125/61     Weight: 106.6 kg (235 lb)  Height: 5' 9" (175.3 cm)  Body mass index is 34.7 kg/m².     Physical Exam  Vitals and nursing note reviewed.   Cardiovascular:      Rate and Rhythm: Normal rate.   Pulmonary:      Effort: Pulmonary effort is normal.   Abdominal:      General: There is no distension.      Tenderness: There is abdominal tenderness. There is no guarding or rebound.      Comments: Kidney incision clean dry intact with staples in place  MARIXA drain x 1 with thin bloody drainage   Neurological:      Mental Status: She is alert and oriented to person, place, and time.   Psychiatric:         Mood and Affect: Mood normal.         Behavior: Behavior normal.         Thought Content: Thought content normal.         Judgment: Judgment normal.          Laboratory:  CBC:   Recent Labs   Lab 05/23/24  1441 05/23/24 1939 05/24/24  0540   WBC  --  14.13* 18.38*   RBC  --  4.11 3.81*   HGB  --  10.5* 9.5*   HCT 31.3* 34.4* 32.0*   PLT  --  109* 113*   MCV  --  84 84   MCH  --  25.5* 24.9*   MCHC  --  30.5* 29.7*     BMP:   Recent Labs   Lab 05/23/24  1441 05/23/24 1939 05/24/24  0540   * 176* 120*    137 137   K 3.9 4.0 4.7    105 107   CO2 24 20* 21*   BUN 42* 43* 40*   CREATININE 6.1* 5.3* 4.2*   CALCIUM 7.3* 7.1* 6.6*       Diagnostic Results:  None  "

## 2024-05-24 NOTE — HPI
Reason for Consult: Management of T2DM, Hyperglycemia     Surgical Procedure and Date: kidney transplant 5/23/24    Diabetes diagnosis year: 2022     Home Diabetes Medications:  none     How often checking glucose at home? Not checking      Diabetes Complications include:     Hyperglycemia    Complicating diabetes co morbidities:   Glucocorticoid use       HPI:   Patient is a 64 y.o. female with hx of T2DM w/ retinopathy, HTN, remote hx of CVA, pulmonary HTN, asymptoamatic CAD, factor XII deficiency, obesity and ESRD on HD who presents to SICU s/p kidney transplant on 5/23/24. Endocrine consulted for post-operative management of BG.

## 2024-05-24 NOTE — ASSESSMENT & PLAN NOTE
- Making good urine output with renal clearance  - MARIXA drain x 1 with thin bloody drainage  - Incision clean dry intact with staples in place  - Stop I=Os, give IVF NS 75 x 8 hours  - Repeat labs at noon  - Encourage ambulation. Encourage IS

## 2024-05-24 NOTE — PROGRESS NOTES
EDUCATION NOTE:    Met with Kathie Potter Quezada and her caregivers to provide teaching re: immunosuppressant medications.  Reviewed medication section of the Kidney Transplant Education book that was provided.  Emphasized the importance of compliance, role of the blue medication card, concerns for drug interactions, and process of obtaining refills.  Counseled regarding Prograf, Cellcept , prednisone, including directions for use, monitoring, how to handle missed doses, and side effects.  Patient and family verbalized understanding and had the opportunity to ask questions.

## 2024-05-24 NOTE — PLAN OF CARE
Recommendations    1. Continue Renal diet      2. If PO intake <50%, add Novasource renal BID      3. RD to monitor and follow    Goals: Meet % EEN, EPN by RD f/u  Nutrition Goal Status: new  Communication of RD Recs:  (POC)

## 2024-05-24 NOTE — PROGRESS NOTES
Transplant  Admit / (Tentative) Weekend Discharge Note     SW met with patient to assess needs. Patient is a 64 y.o.  female, admitted for for kidney transplant.      Patient admitted from home on 5/23/2024 .  At this time, patient presents as alert and oriented x 4, pleasant, good eye contact, well groomed, recall good, concentration/judgement good, average intelligence, calm, communicative, cooperative, and asking and answering questions appropriately.  At this time, patients caregiver presents as  not present at time of SW visit .  SW called pt's daughter / primary caregiver Mike Quezada ([phone]) to review and confirm pt's post-transplant discharge and caregiver plans.    Household/Family Systems     Patient resides with patient's adult son Giovanni Quezada, at [address]  [address]  [place] [address].  Support system includes pt's daughter Mike Quezada, son Giovanni Quezada ([phone]), daughter Nicole Quezada ([phone]), and daughter Kel Quezada ([phone]).  Patient does not have dependents that are need of being cared for.     Patients primary caregiver is Mike Quezada, patients daughter, phone number [phone].  Confirmed patients contact information is [phone] (home);   Telephone Information:   Mobile [phone]   .    During admission, patient's caregiver plans to stay at home.  Confirmed patient and patients caregivers do have access to reliable transportation.    Cognitive Status/Learning     Patient reports reading ability as 12th grade and states patient does not have difficulty with N/A.  Patient reports patient learns best by multisensory information.   Needed: No.   Highest education level: High School (9-12) or GED    Vocation/Disability   .  Working for Income: No  If no, reason not working: Disability  Patient is disabled due to CHF since 2000.  Prior to disability, patient  employment history  unknown.    Adherence     Patient reports a high level of adherence to patients health care regimen.  Adherence counseling and education provided. Patient verbalizes understanding.    Substance Use    Patient reports the following substance usage.    Tobacco: none, patient denies any use.  Alcohol: none, patient denies any use.  Illicit Drugs/Non-prescribed Medications: none, patient denies any use.  Patient states clear understanding of the potential impact of substance use.  Substance abstinence/cessation counseling, education and resources provided and reviewed.     Services Utilizing/ADLS    Infusion Service: Prior to admission, patient utilizing? no  Home Health: Prior to admission, patient utilizing? no  DME: Prior to admission, no  Pulmonary/Cardiac Rehab: Prior to admission, no  Dialysis:  Prior to admission, yes - in-center HD at [facility] on [address]  Transplant Specialty Pharmacy:  Prior to admission, no; patient provides permission to release necessary information to specialty pharmacy for medications post-tranplant. Resources provided and patient is choosing [facility] pharmacy at this time.    Prior to admission, patient reports patient was independent with ADLS and was driving.  Patient reports patient is not able to care for self at this time due to compromised medical condition (as documented in medical record) and physical weakness..  Patient indicates a willingness to care for self once medically cleared to do so.    Insurance/Medications    Insured by   Payer/Plan Subscr  Sex Relation Sub. Ins. ID Effective Group Num   1. HU[employer] MANAGE* JOKIRSTIE* 1960 Female Self [id number]                                   [address] [address]   2. [place]* OSORIOKIRSTIE* 1960 Female Self [id number]* [id number]                                    [address] [address]      Primary Insurance (for UNOS reporting): Public Insurance - Medicare & Choice  Secondary Insurance (for UNOS  reporting): Public Insurance - Medicaid    Patient reports patient is able to obtain and afford medications at this time and at time of discharge.    Living Will/Healthcare Power of     Patient states patient does not have a LW and/or HCPA.   provided education regarding LW and HCPA and the completion of forms.    Coping/Mental Health    Patient is coping adequately with the aid of  family members and friends.  Patient denies mental health difficulties.  Patient confirms still taking Lexapro 10 mg daily and Trazadone 50 mg PRN and reports medication    Discharge Planning    At time of discharge, patient plans to discharge to patient's home under the care of daughter(s) & son.  Patients son and daughter will transport patient.  Per rounds today, expected discharge date is tentatively scheduled for over the weekend . Patient and patients caregiver  verbalize understanding and are involved in treatment planning and discharge process.    No psychosocial needs indicated for discharge at this time.    Additional Concerns    Patient's caretaker denies additional needs and/or concerns at this time. Patient is being followed for needs, education, resources, information, emotional support, supportive counseling, and for supportive and skilled discharge plan of care.  providing ongoing psychosocial support, education, resources and d/c planning as needed.  SW remains available.  remains available. Patient's caregiver verbalizes understanding and agreement with information reviewed,  availability and how to access available resources as needed. Patient denies additional needs and/or concerns at this time. Patient verbalizes understanding and agreement with information reviewed, social work availability, and how to access available resources as needed.

## 2024-05-24 NOTE — PROGRESS NOTES
[facility]  Kidney Transplant  Progress Note      Reason for Follow-up: Reassessment of Kidney Transplant - 5/23/2024  (#1) recipient and management of immunosuppression.    ORGAN: LEFT KIDNEY      Donor Type: Living      Donor CMV Status:    Donor HBcAB:   Donor HCV Status:   Donor HBV MELISA:   Donor HCV MELISA:       Subjective:   History of Present Illness:  Ms. 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 She has a history of CVA in 2017 without any deficit. She also has factor XII deficiency, history of hep C (s/p treatment with harvoni. Seen by hepatology with fibroscan performed showing F0-F1), and pHTN (RHC performed on 6/7/23 and found to have group 2 HTN. TTE and cardiac PET with preserved EF)    Ms. Quezada is a 64 y.o. year old female who is status post Kidney Transplant - 5/23/2024  (#1).  Her maintenance immunosuppression consists of:   Immunosuppressants (From admission, onward)      Start     Stop Route Frequency Ordered    05/24/24 1200  antithymocyte globulin (rabbit) 125 mg, hydrocortisone sodium succinate (SOLU-CORTEF) 20 mg in sodium chloride 0.9% 500 mL (FOR PERIPHERAL LINE ADMINISTRATION ONLY)  (IP TXP KIDNEY POST-OP THYMO WITH PERIPHERAL PRECHECKED)         05/26/24 1159 IV Daily 05/23/24 1416    05/23/24 2200  mycophenolate capsule 1,000 mg  (IP TXP KIDNEY POST-OP THYMO WITH PERIPHERAL PRECHECKED)         -- Oral 2 times daily 05/23/24 1416    05/23/24 1800  tacrolimus capsule 3 mg  (IP TXP KIDNEY POST-OP THYMO WITH PERIPHERAL PRECHECKED)         -- Oral 2 times daily 05/23/24 1416    05/23/24 1045  antithymocyte globulin (rabbit) 125 mg, hydrocortisone sodium succinate (SOLU-CORTEF) 20 mg in sodium chloride 0.9% 500 mL (FOR PERIPHERAL LINE ADMINISTRATION ONLY)         -- IV Once 05/23/24 0938                Hospital Course:  She is now s/p  living related kidney transplant on 5/23/34. Surgery without complication. 2 arteries reconstructed side-to-side. 2 day MARIXA mera x 1 to monitor for bleeding. Due to factor XII deficiency she received 1 unit of FFP in OR and will received 1 unit FFP POD#1. She will receive standard DVT prophylaxis while inpatient then prophy lovenox x 2 weeks on discharge.     Interval History: No acute events overnight. Patient is feeling well this morning. She is making a large amount of urine with renal clearance. Drain x 1 bloody but thin. Plan for 1 unit of FFP today due to factor XII deficiency. Will repeat labs at noon. Pathway and place on IVF at 75 cc/hr. Will reassess stopping IVF early afternoon. Encourage ambulation. Encourage IS.       Past Medical, Surgical, Family, and Social History:   Unchanged from H&P.    Scheduled Meds:   acetaminophen  650 mg Oral Q8H    acetaminophen  650 mg Oral Q24H    antithymocyte globulin (rabbit) 125 mg, hydrocortisone sodium succinate (SOLU-CORTEF) 20 mg in sodium chloride 0.9% 500 mL (FOR PERIPHERAL LINE ADMINISTRATION ONLY)  125 mg Intravenous Once    antithymocyte globulin (rabbit) 125 mg, hydrocortisone sodium succinate (SOLU-CORTEF) 20 mg in sodium chloride 0.9% 500 mL (FOR PERIPHERAL LINE ADMINISTRATION ONLY)  1.5 mg/kg (Adjusted) Intravenous Daily    bisacodyL  10 mg Oral QHS    diphenhydrAMINE  25 mg Oral Once    diphenhydrAMINE  25 mg Oral Q24H    docusate sodium  100 mg Oral TID    EScitalopram oxalate  10 mg Oral QHS    famotidine  20 mg Oral QHS    heparin (porcine)  5,000 Units Subcutaneous Q8H    levothyroxine  200 mcg Oral Before breakfast    methylPREDNISolone sodium succinate injection  5 mg/kg Intravenous Once    [START ON 5/25/2024] methylPREDNISolone sodium succinate injection  125 mg Intravenous Once    methylPREDNISolone sodium succinate injection  250 mg Intravenous Once    multivitamin  1 tablet Oral Daily    mupirocin  1 g Nasal BID    mycophenolate  1,000 mg  Oral BID    [START ON 5/26/2024] predniSONE  20 mg Oral Daily    tacrolimus  3 mg Oral BID    [START ON 6/2/2024] valGANciclovir  450 mg Oral Daily AM     Continuous Infusions:   sodium chloride 0.9%   Intravenous Continuous        glucagon (human recombinant)  1 mg Intramuscular Continuous PRN         PRN Meds:  Current Facility-Administered Medications:     0.9%  NaCl infusion (for blood administration), , Intravenous, Q24H PRN    0.9%  NaCl infusion (for blood administration), , Intravenous, Q24H PRN    dextrose 10%, 12.5 g, Intravenous, PRN    dextrose 10%, 12.5 g, Intravenous, PRN    dextrose 10%, 25 g, Intravenous, PRN    dextrose 10%, 25 g, Intravenous, PRN    diphenhydrAMINE, 25 mg, Oral, Q6H PRN    diphenhydrAMINE, 50 mg, Oral, Once PRN    EPINEPHrine (PF), 1 mg, Subcutaneous, Once PRN    EPINEPHrine (PF), 1 mg, Subcutaneous, Once PRN    glucagon (human recombinant), 1 mg, Intramuscular, Continuous PRN    glucagon (human recombinant), 1 mg, Intramuscular, PRN    glucose, 16 g, Oral, PRN    glucose, 16 g, Oral, PRN    glucose, 16 g, Oral, PRN    glucose, 24 g, Oral, PRN    glucose, 24 g, Oral, PRN    hydrocortisone sodium succinate, 100 mg, Intravenous, Once PRN    hydrocortisone sodium succinate, 100 mg, Intravenous, Once PRN    insulin aspart U-100, 0-5 Units, Subcutaneous, QID (AC + HS) PRN    ondansetron, 4 mg, Intravenous, Q6H PRN    oxyCODONE, 5 mg, Oral, Q6H PRN    sodium chloride 0.9%, 10 mL, Intravenous, PRN    traMADoL, 50 mg, Oral, Q6H PRN    traZODone, 50 mg, Oral, Nightly PRN    Intake/Output - Last 3 Shifts         05/22 0700 05/23 0659 05/23 0700 05/24 0659 05/24 0700 05/25 0659    P.O.  1160     I.V. (mL/kg)  2018 (18.9)     Blood  7966.7     IV Piggyback  2218.9     Total Intake(mL/kg)  59034.6 (125.4)     Urine (mL/kg/hr)  3930 (1.5) 300 (1.1)    Drains  180 50    Total Output  4110 350    Net  +9253.6 -350                    Review of Systems   Constitutional:  Negative for activity  "change and appetite change.   Respiratory:  Negative for shortness of breath.    Gastrointestinal:  Positive for abdominal pain. Negative for abdominal distention, nausea and vomiting.   Genitourinary:  Negative for decreased urine volume and hematuria.   Allergic/Immunologic: Positive for immunocompromised state.   Psychiatric/Behavioral:  Negative for confusion and decreased concentration.       Objective:     Vital Signs (Most Recent):  Temp: 99 °F (37.2 °C) (05/24/24 0730)  Pulse: 84 (05/24/24 0800)  Resp: 14 (05/24/24 0730)  BP: 125/61 (05/24/24 0800)  SpO2: 97 % (05/24/24 0800) Vital Signs (24h Range):  Temp:  [98.2 °F (36.8 °C)-99.5 °F (37.5 °C)] 99 °F (37.2 °C)  Pulse:  [] 84  Resp:  [14-23] 14  SpO2:  [96 %-100 %] 97 %  BP: (110-180)/(53-75) 125/61     Weight: 106.6 kg (235 lb)  Height: 5' 9" (175.3 cm)  Body mass index is 34.7 kg/m².     Physical Exam  Vitals and nursing note reviewed.   Cardiovascular:      Rate and Rhythm: Normal rate.   Pulmonary:      Effort: Pulmonary effort is normal.   Abdominal:      General: There is no distension.      Tenderness: There is abdominal tenderness. There is no guarding or rebound.      Comments: Kidney incision clean dry intact with staples in place  MARIXA drain x 1 with thin bloody drainage   Neurological:      Mental Status: She is alert and oriented to person, place, and time.   Psychiatric:         Mood and Affect: Mood normal.         Behavior: Behavior normal.         Thought Content: Thought content normal.         Judgment: Judgment normal.          Laboratory:  CBC:   Recent Labs   Lab 05/23/24  1441 05/23/24  1939 05/24/24  0540   WBC  --  14.13* 18.38*   RBC  --  4.11 3.81*   HGB  --  10.5* 9.5*   HCT 31.3* 34.4* 32.0*   PLT  --  109* 113*   MCV  --  84 84   MCH  --  25.5* 24.9*   MCHC  --  30.5* 29.7*     BMP:   Recent Labs   Lab 05/23/24  1441 05/23/24  1939 05/24/24  0540   * 176* 120*    137 137   K 3.9 4.0 4.7    105 107   CO2 24 " 20* 21*   BUN 42* 43* 40*   CREATININE 6.1* 5.3* 4.2*   CALCIUM 7.3* 7.1* 6.6*       Diagnostic Results:  None  Assessment/Plan:     * S/P kidney transplant  - Making good urine output with renal clearance  - MARIXA drain x 1 with thin bloody drainage  - Incision clean dry intact with staples in place  - Stop I=Os, give IVF NS 75 x 8 hours  - Repeat labs at noon  - Encourage ambulation. Encourage IS      Hypocalcemia  - Give IV Calcium  - Start po calcium replacement  - Monitor with daily labs      Acute blood loss anemia  - Expected post operatively  - H/H stable  - Plan for repeat at noon  - Monitor with daily cbc      Prophylactic immunotherapy  - See long term use of immunosuppressant medication      Long-term use of immunosuppressant medication  - Continue prograf, cellcept, steroids. Monitor prograf level daily, monitor for toxic side effects, and adjust for therapeutic dose      Anemia of chronic disease  - See acute blood loss anemia      Controlled type 2 diabetes mellitus with stable proliferative retinopathy of both eyes, unspecified whether long term insulin use  - Endocrine consulted for help with management. Appreciate their assistance      Class 2 severe obesity due to excess calories with serious comorbidity and body mass index (BMI) of 35.0 to 35.9 in adult  - Dietician consulted for help with management. Appreciate their assistance      Factor XI deficiency  - 1 unit of FFP given in OR  - Plan for another 1 unit today POD#1  - Will receive standard DVT prophylaxis while inpatient then prophylaxis lovenox x 2 weeks on discharge.       Renovascular hypertension  - Blood pressure currently acceptable  - Will monitor and reassess need to restart procardia (home antihypertensive)        Discharge Planning: Not a candidate for discharge at this time     Medical decision making for this encounter includes review of pertinent labs and diagnostic studies, assessment and planning, discussions with consulting  providers, discussion with patient/family, and participation in multidisciplinary rounds. Time spent caring for patient: 60 minutes    Leela Bustillo, PA-C  Kidney Transplant  [facility] Transplant Step[facility]

## 2024-05-24 NOTE — PLAN OF CARE
Pt has call bell in reach, non slip socks on, and bedrails up x2. She has family at bedside attentive to pt. Pt self med teaching to be done. Pt is allergic to betadine, so none applied to incision. She has a mera catheter with strict I&Os. Pt is now at 75cc/hr normal saline and encouraged to drink fluids. She has receive FFP today x1 dose. She is currently on thymo number 2 tolerating well. Pt has prn pain meds that we are giving. She is ac/hs with endocrine following.

## 2024-05-24 NOTE — PLAN OF CARE
Pt AAOx4, VSS. SpO2 >95% on 2L NC. Pt with D51/2NS infusing @50cc/h, NS infusing --see MAR. Pt with midline incision covered with island border dressing, CDI. RLQ MARIXA in place with sanguineous output--see flowsheet for details. Sheth intact draining clear yellow urine. Vanc and ciprofloxacin completed. REBECCA fistula +/+. PRN oxycodone and tramadol administered for pain. Trazodone administered for insomnia yet pt did not sleep throughout the night. BG monitored AC/HS, SSI administered per MAR. Pt weight shifting, practicing deep breathing. Bed in lowest locked position, call light within reach, POC ongoing.

## 2024-05-24 NOTE — CONSULTS
[facility] - Transplant Stepdown  Endocrinology  Diabetes Consult Note    Consult Requested by: Stepan Bernardo MD   Reason for admit: S/P kidney transplant    HISTORY OF PRESENT ILLNESS:  Reason for Consult: Management of T2DM, Hyperglycemia     Surgical Procedure and Date: kidney transplant 24    Diabetes diagnosis year:      Home Diabetes Medications:  none     How often checking glucose at home? Not checking      Diabetes Complications include:     Hyperglycemia    Complicating diabetes co morbidities:   Glucocorticoid use       HPI:   Patient is a 64 y.o. female with hx of T2DM w/ retinopathy, HTN, remote hx of CVA, pulmonary HTN, asymptoamatic CAD, factor XII deficiency, obesity and ESRD on HD who presents to SICU s/p kidney transplant on 24. Endocrine consulted for post-operative management of BG.     Interval HPI:   No acute events overnight. Patient in room 23394/29855 A. Blood glucose stable. BG at and above goal on current insulin regimen (SSI ). Steroid use- Methylprednisolone  250 mg.   1 Day Post-Op  Renal function-   Lab Results   Component Value Date    CREATININE 4.2 (H) 2024        Vasopressors-  None     Diet Low Potassium     Eatin%  Nausea: No  Hypoglycemia and intervention: No  Fever: No  TPN and/or TF: No    PMH, PSH, FH, SH updated and reviewed     ROS:  Review of Systems   Constitutional:  Negative for unexpected weight change.   Gastrointestinal:  Negative for constipation, diarrhea, nausea and vomiting.   Endocrine: Negative for polyuria. (+) for polydipsia.       Current Medications and/or Treatments Impacting Glycemic Control  Immunotherapy:    Immunosuppressants           Stop Route Frequency     antithymocyte globulin (rabbit) 125 mg, hydrocortisone sodium succinate (SOLU-CORTEF) 20 mg in sodium chloride 0.9% 500 mL (FOR PERIPHERAL LINE ADMINISTRATION ONLY)         24 1159 IV Daily     mycophenolate capsule 1,000 mg         -- Oral 2 times daily      tacrolimus capsule 3 mg         -- Oral 2 times daily     antithymocyte globulin (rabbit) 125 mg, hydrocortisone sodium succinate (SOLU-CORTEF) 20 mg in sodium chloride 0.9% 500 mL (FOR PERIPHERAL LINE ADMINISTRATION ONLY)         -- IV Once          Steroids:   Hormones (From admission, onward)      Start     Stop Route Frequency Ordered    05/26/24 0900  predniSONE tablet 20 mg  (IP TXP KIDNEY POST-OP THYMO WITH PERIPHERAL PRECHECKED)         -- Oral Daily 05/23/24 1416    05/25/24 1100  methylPREDNISolone sodium succinate injection 125 mg  (IP TXP KIDNEY POST-OP THYMO WITH PERIPHERAL PRECHECKED)         -- IV Once 05/23/24 1416    05/24/24 1200  antithymocyte globulin (rabbit) 125 mg, hydrocortisone sodium succinate (SOLU-CORTEF) 20 mg in sodium chloride 0.9% 500 mL (FOR PERIPHERAL LINE ADMINISTRATION ONLY)  (IP TXP KIDNEY POST-OP THYMO WITH PERIPHERAL PRECHECKED)         05/26/24 1159 IV Daily 05/23/24 1416    05/24/24 1100  methylPREDNISolone sodium succinate injection 250 mg  (IP TXP KIDNEY POST-OP THYMO WITH PERIPHERAL PRECHECKED)         -- IV Once 05/23/24 1416    05/23/24 1515  hydrocortisone sodium succinate injection 100 mg  (IP TXP KIDNEY POST-OP THYMO WITH PERIPHERAL PRECHECKED)         10/20/35 0615 IV Once as needed 05/23/24 1416    05/23/24 1045  antithymocyte globulin (rabbit) 125 mg, hydrocortisone sodium succinate (SOLU-CORTEF) 20 mg in sodium chloride 0.9% 500 mL (FOR PERIPHERAL LINE ADMINISTRATION ONLY)         -- IV Once 05/23/24 0938    05/23/24 1045  methylPREDNISolone sodium succinate (SOLU-MEDROL) 533 mg in dextrose 5 % (D5W) 100 mL IVPB  (methylPREDNISolone)         -- IV Once 05/23/24 0938    05/23/24 1037  hydrocortisone sodium succinate injection 100 mg         10/20/35 0137 IV Once as needed 05/23/24 0938          Pressors:    Autonomic Drugs (From admission, onward)      Start     Stop Route Frequency Ordered    05/23/24 1515  EPINEPHrine (PF) injection 1 mg  (IP TXP KIDNEY POST-OP  "THYMO WITH PERIPHERAL PRECHECKED)         10/20/35 0615 SubQ Once as needed 05/23/24 1416    05/23/24 1037  EPINEPHrine (PF) injection 1 mg         10/20/35 0137 SubQ Once as needed 05/23/24 0938          Hyperglycemia/Diabetes Medications:   Antihyperglycemics (From admission, onward)      Start     Stop Route Frequency Ordered    05/24/24 0944  insulin aspart U-100 pen 0-5 Units         -- SubQ Before meals & nightly PRN 05/24/24 0844             PHYSICAL EXAMINATION:  Vitals:    05/24/24 0800   BP: 125/61   Pulse: 84   Resp:    Temp:      Body mass index is 34.7 kg/m².     Physical Exam  Vitals reviewed.   Constitutional:       Appearance: Normal appearance.   HENT:      Head: Normocephalic.   Neurological:      General: No focal deficit present.      Mental Status: She is alert and oriented to person, place, and time.   Psychiatric:         Mood and Affect: Mood normal.         Behavior: Behavior normal.         Thought Content: Thought content normal.            Labs Reviewed and Include   Recent Labs   Lab 05/24/24  0540   *   CALCIUM 6.6*   ALBUMIN 2.5*      K 4.7   CO2 21*      BUN 40*   CREATININE 4.2*     Lab Results   Component Value Date    WBC 18.38 (H) 05/24/2024    HGB 9.5 (L) 05/24/2024    HCT 32.0 (L) 05/24/2024    MCV 84 05/24/2024     (L) 05/24/2024     No results for input(s): "TSH", "FREET4" in the last 168 hours.  Lab Results   Component Value Date    HGBA1C 5.5 05/23/2024       Nutritional status:   Body mass index is 34.7 kg/m².  Lab Results   Component Value Date    ALBUMIN 2.5 (L) 05/24/2024    ALBUMIN 2.9 (L) 05/23/2024    ALBUMIN 2.9 (L) 05/23/2024     No results found for: "PREALBUMIN"    Estimated Creatinine Clearance: 18.1 mL/min (A) (based on SCr of 4.2 mg/dL (H)).    Accu-Checks  Recent Labs     05/23/24  1441 05/23/24  1837 05/23/24  2228 05/24/24  0733   POCTGLUCOSE 189* 187* 217* 121*        ASSESSMENT and PLAN    Renal/  * S/P kidney " transplant  Managed per primary team        Endocrine  Controlled type 2 diabetes mellitus with stable proliferative retinopathy of both eyes, unspecified whether long term insulin use  BG goal: 140-180. Of note, pt ate prior to >200 BG check.     - LDC (150/50) prn.    - POCT glucose ac/hs  - POCT Glucose before meals and at bedtime  - Hypoglycemia protocol in place      ** Please notify Endocrine for any change and/or advance in diet**  ** Please call Endocrine for any BG related issues **     Discharge Planning:   TBD. Please notify endocrinology prior to discharge.        Other  Adrenal cortical steroids causing adverse effect in therapeutic use    Glucocorticoids markedly increase glucose levels. Expect the steroid taper will help glucose control.           Plan discussed with patient and RN at bedside.     Trang Rivero PA-C  Endocrinology  [facility] - Transplant Stepdown

## 2024-05-24 NOTE — ASSESSMENT & PLAN NOTE
BG goal: 140-180    - LDC (150/50) prn   - POCT glucose ac/hs  - POCT Glucose before meals and at bedtime  - Hypoglycemia protocol in place      ** Please notify Endocrine for any change and/or advance in diet**  ** Please call Endocrine for any BG related issues **     Discharge Planning:   TBD. Please notify endocrinology prior to discharge.

## 2024-05-24 NOTE — ASSESSMENT & PLAN NOTE
- Blood pressure currently acceptable  - Will monitor and reassess need to restart procardia (home antihypertensive)

## 2024-05-24 NOTE — PROGRESS NOTES
Transplant Teaching Book given to patient, Kathie Quezada, on 05/24/2024.  Caregivers at bedside. During the course of the hospital stay the patient received information regarding kidney transplant. Teaching and instruction were completed.  Areas that were discussed included: how to contact the Transplant Team, the importance of measuring intake of fluids and urine output, and monitoring vital signs such as blood pressure, temperature, and daily weights.  Parameters for which to report abnormal findings were given.  Appointment were provided along with the rational for the importance of lab work and clinic visits.  A written medication list was provided.  The importance of immunosuppressive medications, their common side effects, and treatment to prevent or minimize side effects has been reviewed.  Signs and symptoms of rejection and infection along with various treatments were reviewed.  The need to avoid infection was discussed.  Wound care and special consideration regarding activities of daily living were explained.  Written and verbal teaching of the above information was given.     Discussed with the patient and caregiver the importance of maintaining COVID-19 precautions; wearing a mask, good handwashing, and social distancing.  Also, to report any signs or symptoms (fever, difficulty breathing, loss of taste/smell, etc.), suspected exposure, or COVID testing, immediately to the transplant program.

## 2024-05-25 LAB
ALBUMIN SERPL BCP-MCNC: 2.7 G/DL (ref 3.5–5.2)
ANION GAP SERPL CALC-SCNC: 9 MMOL/L (ref 8–16)
BASOPHILS # BLD AUTO: 0.01 K/UL (ref 0–0.2)
BASOPHILS NFR BLD: 0.1 % (ref 0–1.9)
BUN SERPL-MCNC: 29 MG/DL (ref 8–23)
CALCIUM SERPL-MCNC: 8.2 MG/DL (ref 8.7–10.5)
CHLORIDE SERPL-SCNC: 112 MMOL/L (ref 95–110)
CO2 SERPL-SCNC: 21 MMOL/L (ref 23–29)
CREAT SERPL-MCNC: 2 MG/DL (ref 0.5–1.4)
DIFFERENTIAL METHOD BLD: ABNORMAL
EOSINOPHIL # BLD AUTO: 0 K/UL (ref 0–0.5)
EOSINOPHIL NFR BLD: 0 % (ref 0–8)
ERYTHROCYTE [DISTWIDTH] IN BLOOD BY AUTOMATED COUNT: 17.3 % (ref 11.5–14.5)
EST. GFR  (NO RACE VARIABLE): 27.7 ML/MIN/1.73 M^2
GLUCOSE SERPL-MCNC: 192 MG/DL (ref 70–110)
HCT VFR BLD AUTO: 32.6 % (ref 37–48.5)
HGB BLD-MCNC: 10 G/DL (ref 12–16)
IMM GRANULOCYTES # BLD AUTO: 0.04 K/UL (ref 0–0.04)
IMM GRANULOCYTES NFR BLD AUTO: 0.4 % (ref 0–0.5)
LYMPHOCYTES # BLD AUTO: 0.2 K/UL (ref 1–4.8)
LYMPHOCYTES NFR BLD: 1.5 % (ref 18–48)
MAGNESIUM SERPL-MCNC: 2.1 MG/DL (ref 1.6–2.6)
MCH RBC QN AUTO: 25.6 PG (ref 27–31)
MCHC RBC AUTO-ENTMCNC: 30.7 G/DL (ref 32–36)
MCV RBC AUTO: 83 FL (ref 82–98)
MONOCYTES # BLD AUTO: 0.5 K/UL (ref 0.3–1)
MONOCYTES NFR BLD: 4.9 % (ref 4–15)
NEUTROPHILS # BLD AUTO: 10.4 K/UL (ref 1.8–7.7)
NEUTROPHILS NFR BLD: 93.1 % (ref 38–73)
NRBC BLD-RTO: 0 /100 WBC
PHOSPHATE SERPL-MCNC: 3.6 MG/DL (ref 2.7–4.5)
PLATELET # BLD AUTO: 85 K/UL (ref 150–450)
PMV BLD AUTO: 11.7 FL (ref 9.2–12.9)
POCT GLUCOSE: 176 MG/DL (ref 70–110)
POCT GLUCOSE: 269 MG/DL (ref 70–110)
POCT GLUCOSE: 308 MG/DL (ref 70–110)
POTASSIUM SERPL-SCNC: 4.8 MMOL/L (ref 3.5–5.1)
RBC # BLD AUTO: 3.91 M/UL (ref 4–5.4)
SODIUM SERPL-SCNC: 142 MMOL/L (ref 136–145)
TACROLIMUS BLD-MCNC: 3.9 NG/ML (ref 5–15)
WBC # BLD AUTO: 11.13 K/UL (ref 3.9–12.7)

## 2024-05-25 PROCEDURE — 20600001 HC STEP DOWN PRIVATE ROOM: Mod: HCNC

## 2024-05-25 PROCEDURE — 63600175 PHARM REV CODE 636 W HCPCS: Mod: HCNC

## 2024-05-25 PROCEDURE — 83735 ASSAY OF MAGNESIUM: CPT | Mod: HCNC | Performed by: STUDENT IN AN ORGANIZED HEALTH CARE EDUCATION/TRAINING PROGRAM

## 2024-05-25 PROCEDURE — 63600175 PHARM REV CODE 636 W HCPCS: Mod: HCNC | Performed by: PHYSICIAN ASSISTANT

## 2024-05-25 PROCEDURE — 27000207 HC ISOLATION: Mod: HCNC

## 2024-05-25 PROCEDURE — 85025 COMPLETE CBC W/AUTO DIFF WBC: CPT | Mod: HCNC | Performed by: STUDENT IN AN ORGANIZED HEALTH CARE EDUCATION/TRAINING PROGRAM

## 2024-05-25 PROCEDURE — 25000003 PHARM REV CODE 250: Mod: HCNC | Performed by: TRANSPLANT SURGERY

## 2024-05-25 PROCEDURE — 25000003 PHARM REV CODE 250: Mod: HCNC | Performed by: INTERNAL MEDICINE

## 2024-05-25 PROCEDURE — 63600175 PHARM REV CODE 636 W HCPCS: Mod: JZ,JG,HCNC | Performed by: STUDENT IN AN ORGANIZED HEALTH CARE EDUCATION/TRAINING PROGRAM

## 2024-05-25 PROCEDURE — 25000003 PHARM REV CODE 250: Mod: HCNC | Performed by: PHYSICIAN ASSISTANT

## 2024-05-25 PROCEDURE — 63600175 PHARM REV CODE 636 W HCPCS: Mod: HCNC | Performed by: NURSE PRACTITIONER

## 2024-05-25 PROCEDURE — 80197 ASSAY OF TACROLIMUS: CPT | Mod: HCNC | Performed by: STUDENT IN AN ORGANIZED HEALTH CARE EDUCATION/TRAINING PROGRAM

## 2024-05-25 PROCEDURE — 36415 COLL VENOUS BLD VENIPUNCTURE: CPT | Mod: HCNC | Performed by: STUDENT IN AN ORGANIZED HEALTH CARE EDUCATION/TRAINING PROGRAM

## 2024-05-25 PROCEDURE — 80069 RENAL FUNCTION PANEL: CPT | Mod: HCNC | Performed by: STUDENT IN AN ORGANIZED HEALTH CARE EDUCATION/TRAINING PROGRAM

## 2024-05-25 PROCEDURE — 25000003 PHARM REV CODE 250: Mod: HCNC | Performed by: STUDENT IN AN ORGANIZED HEALTH CARE EDUCATION/TRAINING PROGRAM

## 2024-05-25 RX ORDER — NIFEDIPINE 30 MG/1
30 TABLET, EXTENDED RELEASE ORAL DAILY
Status: DISCONTINUED | OUTPATIENT
Start: 2024-05-25 | End: 2024-05-25

## 2024-05-25 RX ORDER — TACROLIMUS 1 MG/1
6 CAPSULE ORAL 2 TIMES DAILY
Status: CANCELLED | OUTPATIENT
Start: 2024-05-25

## 2024-05-25 RX ORDER — SODIUM BICARBONATE 650 MG/1
1300 TABLET ORAL 2 TIMES DAILY
Status: CANCELLED | OUTPATIENT
Start: 2024-05-25

## 2024-05-25 RX ORDER — INSULIN ASPART 100 [IU]/ML
0-10 INJECTION, SOLUTION INTRAVENOUS; SUBCUTANEOUS
Status: DISCONTINUED | OUTPATIENT
Start: 2024-05-25 | End: 2024-05-27 | Stop reason: HOSPADM

## 2024-05-25 RX ORDER — NIFEDIPINE 30 MG/1
30 TABLET, EXTENDED RELEASE ORAL DAILY
Status: DISCONTINUED | OUTPATIENT
Start: 2024-05-25 | End: 2024-05-27 | Stop reason: HOSPADM

## 2024-05-25 RX ORDER — OXYCODONE HYDROCHLORIDE 5 MG/1
5 TABLET ORAL EVERY 4 HOURS PRN
Qty: 30 TABLET | Refills: 0 | Status: CANCELLED | OUTPATIENT
Start: 2024-05-25

## 2024-05-25 RX ORDER — CARVEDILOL 6.25 MG/1
6.25 TABLET ORAL 2 TIMES DAILY
Status: DISCONTINUED | OUTPATIENT
Start: 2024-05-25 | End: 2024-05-27 | Stop reason: HOSPADM

## 2024-05-25 RX ORDER — HEPARIN 100 UNIT/ML
5 SYRINGE INTRAVENOUS ONCE
Status: COMPLETED | OUTPATIENT
Start: 2024-05-25 | End: 2024-05-25

## 2024-05-25 RX ORDER — CLONIDINE HYDROCHLORIDE 0.1 MG/1
0.1 TABLET ORAL EVERY 6 HOURS PRN
Status: DISCONTINUED | OUTPATIENT
Start: 2024-05-25 | End: 2024-05-27 | Stop reason: HOSPADM

## 2024-05-25 RX ORDER — TACROLIMUS 1 MG/1
2 CAPSULE ORAL ONCE
Status: CANCELLED | OUTPATIENT
Start: 2024-05-25 | End: 2024-05-25

## 2024-05-25 RX ORDER — FUROSEMIDE 10 MG/ML
80 INJECTION INTRAMUSCULAR; INTRAVENOUS ONCE
Status: CANCELLED | OUTPATIENT
Start: 2024-05-25 | End: 2024-05-25

## 2024-05-25 RX ORDER — INSULIN ASPART 100 [IU]/ML
3 INJECTION, SOLUTION INTRAVENOUS; SUBCUTANEOUS
Status: DISCONTINUED | OUTPATIENT
Start: 2024-05-26 | End: 2024-05-27

## 2024-05-25 RX ADMIN — FAMOTIDINE 20 MG: 20 TABLET ORAL at 09:05

## 2024-05-25 RX ADMIN — DOCUSATE SODIUM 100 MG: 100 CAPSULE, LIQUID FILLED ORAL at 09:05

## 2024-05-25 RX ADMIN — CALCIUM CARBONATE (ANTACID) CHEW TAB 500 MG 500 MG: 500 CHEW TAB at 09:05

## 2024-05-25 RX ADMIN — HEPARIN SODIUM 5000 UNITS: 5000 INJECTION INTRAVENOUS; SUBCUTANEOUS at 01:05

## 2024-05-25 RX ADMIN — TACROLIMUS 4 MG: 1 CAPSULE ORAL at 05:05

## 2024-05-25 RX ADMIN — ACETAMINOPHEN 650 MG: 325 TABLET ORAL at 11:05

## 2024-05-25 RX ADMIN — ATOVAQUONE 1500 MG: 750 SUSPENSION ORAL at 09:05

## 2024-05-25 RX ADMIN — TRAZODONE HYDROCHLORIDE 50 MG: 50 TABLET ORAL at 11:05

## 2024-05-25 RX ADMIN — INSULIN ASPART 8 UNITS: 100 INJECTION, SOLUTION INTRAVENOUS; SUBCUTANEOUS at 06:05

## 2024-05-25 RX ADMIN — INSULIN ASPART 3 UNITS: 100 INJECTION, SOLUTION INTRAVENOUS; SUBCUTANEOUS at 11:05

## 2024-05-25 RX ADMIN — MUPIROCIN 1 G: 20 OINTMENT TOPICAL at 09:05

## 2024-05-25 RX ADMIN — MYCOPHENOLATE MOFETIL 1000 MG: 250 CAPSULE ORAL at 09:05

## 2024-05-25 RX ADMIN — CLONIDINE HYDROCHLORIDE 0.1 MG: 0.1 TABLET ORAL at 06:05

## 2024-05-25 RX ADMIN — DIPHENHYDRAMINE HYDROCHLORIDE 50 MG: 25 CAPSULE ORAL at 09:05

## 2024-05-25 RX ADMIN — CARVEDILOL 6.25 MG: 6.25 TABLET, FILM COATED ORAL at 09:05

## 2024-05-25 RX ADMIN — ACETAMINOPHEN 650 MG: 325 TABLET ORAL at 06:05

## 2024-05-25 RX ADMIN — INSULIN ASPART 2 UNITS: 100 INJECTION, SOLUTION INTRAVENOUS; SUBCUTANEOUS at 01:05

## 2024-05-25 RX ADMIN — HEPARIN SODIUM 5000 UNITS: 5000 INJECTION INTRAVENOUS; SUBCUTANEOUS at 06:05

## 2024-05-25 RX ADMIN — METHYLPREDNISOLONE SODIUM SUCCINATE 125 MG: 125 INJECTION, POWDER, FOR SOLUTION INTRAMUSCULAR; INTRAVENOUS at 11:05

## 2024-05-25 RX ADMIN — CARVEDILOL 6.25 MG: 6.25 TABLET, FILM COATED ORAL at 07:05

## 2024-05-25 RX ADMIN — NIFEDIPINE 30 MG: 30 TABLET, FILM COATED, EXTENDED RELEASE ORAL at 07:05

## 2024-05-25 RX ADMIN — DIPHENHYDRAMINE HYDROCHLORIDE 25 MG: 25 CAPSULE ORAL at 11:05

## 2024-05-25 RX ADMIN — THERA TABS 1 TABLET: TAB at 09:05

## 2024-05-25 RX ADMIN — ESCITALOPRAM OXALATE 10 MG: 10 TABLET ORAL at 09:05

## 2024-05-25 RX ADMIN — LEVOTHYROXINE SODIUM 200 MCG: 100 TABLET ORAL at 06:05

## 2024-05-25 RX ADMIN — HEPARIN 500 UNITS: 100 SYRINGE at 09:05

## 2024-05-25 RX ADMIN — OXYCODONE 5 MG: 5 TABLET ORAL at 09:05

## 2024-05-25 RX ADMIN — OXYCODONE 5 MG: 5 TABLET ORAL at 10:05

## 2024-05-25 RX ADMIN — TACROLIMUS 4 MG: 1 CAPSULE ORAL at 07:05

## 2024-05-25 RX ADMIN — HEPARIN SODIUM 5000 UNITS: 5000 INJECTION INTRAVENOUS; SUBCUTANEOUS at 09:05

## 2024-05-25 RX ADMIN — ANTI-THYMOCYTE GLOBULIN (RABBIT) 125 MG: 5 INJECTION, POWDER, LYOPHILIZED, FOR SOLUTION INTRAVENOUS at 12:05

## 2024-05-25 NOTE — ASSESSMENT & PLAN NOTE
- 1 unit of FFP given in OR.  A second unit of FFP given POD#1.  - Will receive standard DVT prophylaxis while inpatient then prophylaxis lovenox x 2 weeks on discharge.

## 2024-05-25 NOTE — CARE UPDATE
-Glucose Goal 140-180    -A1C:   Hemoglobin A1C   Date Value Ref Range Status   05/23/2024 5.5 4.0 - 5.6 % Final     Comment:     ADA Screening Guidelines:  5.7-6.4%  Consistent with prediabetes  >or=6.5%  Consistent with diabetes    High levels of fetal hemoglobin interfere with the HbA1C  assay. Heterozygous hemoglobin variants (HbS, HgC, etc)do  not significantly interfere with this assay.   However, presence of multiple variants may affect accuracy.           -HOME REGIMEN: none     -GLUCOSE TREND FOR THE PAST 24HRS:   Recent Labs   Lab 05/24/24  0733 05/24/24  1054 05/24/24  1158 05/24/24  1456 05/24/24  1636 05/24/24 2056   POCTGLUCOSE 121* 209* 113* 147* 172* 235*         -NO HYPOGYCEMIAS NOTED     - Diet  Diet Low Potassium    -TOLERATING 100 % OF PO DIET + some snacking pre-BG checks     -Steroids - Methylprednisolone 125 mg     Plan:   - MDC (150/25) prn.    - POCT glucose ac/hs  - POCT Glucose before meals and at bedtime  - Hypoglycemia protocol in place      ** Please notify Endocrine for any change and/or advance in diet**  ** Please call Endocrine for any BG related issues **     Discharge Planning:   TBD. Please notify endocrinology prior to discharge.      Addendum: prandial excursions noted. Will initiate scheduled novolog.

## 2024-05-25 NOTE — NURSING
Pt mera catheter removed per  order. Pt tolerated well. Hat placed in the bathroom and pt told to call on first void. I also told her to call for any assistance walking.

## 2024-05-25 NOTE — ASSESSMENT & PLAN NOTE
- Hypertensive overnight.  - Uptitrate Nifedipine and Coreg     Please see the attached refill request.

## 2024-05-25 NOTE — ASSESSMENT & PLAN NOTE
- Making good urine output with renal clearance  - MARIXA drain x 1 with thin bloody drainage  - Incision clean dry intact with staples in place  - Stop IVF  - DC Sheth Catheter.  Likely DC MARIXA drain tomorrow 5/26.  - Encourage ambulation. Encourage IS

## 2024-05-25 NOTE — PROGRESS NOTES
[facility] Trans[facility] Step[facility]  Kidney Transplant  Progress Note      Reason for Follow-up: Reassessment of Kidney Transplant - 5/23/2024  (#1) recipient and management of immunosuppression.    ORGAN: LEFT KIDNEY      Donor Type: Living      Donor CMV Status:    Donor HBcAB:   Donor HCV Status:   Donor HBV MELISA:   Donor HCV MELISA:       Subjective:   History of Present Illness:  Ms. 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 She has a history of CVA in 2017 without any deficit. She also has factor XII deficiency, history of hep C (s/p treatment with harvoni. Seen by hepatology with fibroscan performed showing F0-F1), and pHTN (RHC performed on 6/7/23 and found to have group 2 HTN. TTE and cardiac PET with preserved EF)    Ms. Quezada is a 64 y.o. year old female who is status post Kidney Transplant - 5/23/2024  (#1).  Her maintenance immunosuppression consists of:   Immunosuppressants (From admission, onward)      Start     Stop Route Frequency Ordered    05/24/24 1800  tacrolimus capsule 4 mg  (IP TXP KIDNEY POST-OP THYMO WITH PERIPHERAL PRECHECKED)         -- Oral 2 times daily 05/24/24 1448    05/24/24 1200  antithymocyte globulin (rabbit) 125 mg, hydrocortisone sodium succinate (SOLU-CORTEF) 20 mg in sodium chloride 0.9% 500 mL (FOR PERIPHERAL LINE ADMINISTRATION ONLY)  (IP TXP KIDNEY POST-OP THYMO WITH PERIPHERAL PRECHECKED)         05/26/24 1159 IV Daily 05/23/24 1416    05/23/24 2200  mycophenolate capsule 1,000 mg  (IP TXP KIDNEY POST-OP THYMO WITH PERIPHERAL PRECHECKED)         -- Oral 2 times daily 05/23/24 1416    05/23/24 1045  antithymocyte globulin (rabbit) 125 mg, hydrocortisone sodium succinate (SOLU-CORTEF) 20 mg in sodium chloride 0.9% 500 mL (FOR PERIPHERAL LINE ADMINISTRATION ONLY)         -- IV Once 05/23/24 0938                Hospital Course:  She is now s/p  living related kidney transplant on 5/23/34. Surgery without complication. 2 arteries reconstructed side-to-side. 2 day MARIXA mera x 1 to monitor for bleeding. Due to factor XII deficiency she received 1 unit of FFP in OR and will received 1 unit FFP POD#1. She will receive standard DVT prophylaxis while inpatient then prophylaxis lovenox x 2 weeks on discharge.     Interval History: No acute events overnight.  This AM, patient states she feels well.  Good urine output and Cr downtrending.  Ambulated yesterday.  Passed flatus but no BM.  Tolerating PO intake.  Pain well controled.      Past Medical, Surgical, Family, and Social History:   Unchanged from H&P.    Scheduled Meds:   acetaminophen  650 mg Oral Q8H    acetaminophen  650 mg Oral Q24H    antithymocyte globulin (rabbit) 125 mg, hydrocortisone sodium succinate (SOLU-CORTEF) 20 mg in sodium chloride 0.9% 500 mL (FOR PERIPHERAL LINE ADMINISTRATION ONLY)  125 mg Intravenous Once    antithymocyte globulin (rabbit) 125 mg, hydrocortisone sodium succinate (SOLU-CORTEF) 20 mg in sodium chloride 0.9% 500 mL (FOR PERIPHERAL LINE ADMINISTRATION ONLY)  1.5 mg/kg (Adjusted) Intravenous Daily    atovaquone  1,500 mg Oral Daily    bisacodyL  10 mg Oral QHS    calcium carbonate  500 mg Oral BID    carvediloL  6.25 mg Oral BID    diphenhydrAMINE  25 mg Oral Once    diphenhydrAMINE  25 mg Oral Q24H    docusate sodium  100 mg Oral TID    EScitalopram oxalate  10 mg Oral QHS    famotidine  20 mg Oral QHS    heparin (porcine)  5,000 Units Subcutaneous Q8H    levothyroxine  200 mcg Oral Before breakfast    methylPREDNISolone sodium succinate injection  5 mg/kg Intravenous Once    methylPREDNISolone sodium succinate injection  125 mg Intravenous Once    multivitamin  1 tablet Oral Daily    mupirocin  1 g Nasal BID    mycophenolate  1,000 mg Oral BID    NIFEdipine  30 mg Oral Daily    [START ON 5/26/2024] predniSONE  20 mg Oral Daily    tacrolimus  4 mg Oral BID    [START ON  6/2/2024] valGANciclovir  450 mg Oral Daily AM     Continuous Infusions:   glucagon (human recombinant)  1 mg Intramuscular Continuous PRN         PRN Meds:  Current Facility-Administered Medications:     0.9%  NaCl infusion (for blood administration), , Intravenous, Q24H PRN    0.9%  NaCl infusion (for blood administration), , Intravenous, Q24H PRN    cloNIDine, 0.1 mg, Oral, Q6H PRN    dextrose 10%, 12.5 g, Intravenous, PRN    dextrose 10%, 12.5 g, Intravenous, PRN    dextrose 10%, 25 g, Intravenous, PRN    dextrose 10%, 25 g, Intravenous, PRN    diphenhydrAMINE, 25 mg, Oral, Q6H PRN    diphenhydrAMINE, 50 mg, Oral, Once PRN    EPINEPHrine (PF), 1 mg, Subcutaneous, Once PRN    EPINEPHrine (PF), 1 mg, Subcutaneous, Once PRN    glucagon (human recombinant), 1 mg, Intramuscular, Continuous PRN    glucagon (human recombinant), 1 mg, Intramuscular, PRN    glucose, 16 g, Oral, PRN    glucose, 16 g, Oral, PRN    glucose, 16 g, Oral, PRN    glucose, 24 g, Oral, PRN    glucose, 24 g, Oral, PRN    hydrocortisone sodium succinate, 100 mg, Intravenous, Once PRN    hydrocortisone sodium succinate, 100 mg, Intravenous, Once PRN    insulin aspart U-100, 0-10 Units, Subcutaneous, QID (AC + HS) PRN    ondansetron, 4 mg, Intravenous, Q6H PRN    oxyCODONE, 5 mg, Oral, Q6H PRN    sodium chloride 0.9%, 10 mL, Intravenous, PRN    traMADoL, 50 mg, Oral, Q6H PRN    traZODone, 50 mg, Oral, Nightly PRN    Intake/Output - Last 3 Shifts         05/23 0700 05/24 0659 05/24 0700 05/25 0659 05/25 0700 05/26 0659    P.O. 1160 840     I.V. (mL/kg) 2018 (18.9) 1827.8 (16.4)     Blood 7966.7 233.3     IV Piggyback 2218.9 50     Total Intake(mL/kg) 45488.6 (125.4) 2951.1 (26.5)     Urine (mL/kg/hr) 3930 (1.5) 5930 (2.2)     Drains 180 290     Total Output 4110 6220     Net +9253.6 -3268.9            Stool Occurrence   1 x             Review of Systems   Constitutional:  Negative for activity change and appetite change.   Respiratory:  Negative  "for shortness of breath.    Gastrointestinal:  Positive for abdominal pain. Negative for abdominal distention, nausea and vomiting.   Genitourinary:  Negative for decreased urine volume and hematuria.   Skin:  Positive for wound.   Allergic/Immunologic: Positive for immunocompromised state.   Psychiatric/Behavioral:  Negative for confusion and decreased concentration.       Objective:     Vital Signs (Most Recent):  Temp: 97.6 °F (36.4 °C) (05/25/24 0725)  Pulse: 83 (05/25/24 0725)  Resp: 18 (05/25/24 0725)  BP: (!) 170/79 (05/25/24 0740)  SpO2: 97 % (05/25/24 0725) Vital Signs (24h Range):  Temp:  [97.6 °F (36.4 °C)-98.7 °F (37.1 °C)] 97.6 °F (36.4 °C)  Pulse:  [77-90] 83  Resp:  [14-18] 18  SpO2:  [90 %-100 %] 97 %  BP: (131-211)/(57-88) 170/79     Weight: 111.4 kg (245 lb 7.7 oz)  Height: 5' 9" (175.3 cm)  Body mass index is 36.25 kg/m².     Physical Exam  Vitals and nursing note reviewed.   Cardiovascular:      Rate and Rhythm: Normal rate.   Pulmonary:      Effort: Pulmonary effort is normal.   Abdominal:      General: There is no distension.      Tenderness: There is abdominal tenderness. There is no guarding or rebound.      Comments: Kidney incision clean dry intact with staples in place  MARIXA drain x 1 with thin bloody drainage   Neurological:      Mental Status: She is alert and oriented to person, place, and time.   Psychiatric:         Mood and Affect: Mood normal.         Behavior: Behavior normal.         Thought Content: Thought content normal.         Judgment: Judgment normal.          Laboratory:  CBC:   Recent Labs   Lab 05/24/24  0540 05/24/24  1304 05/25/24  0554   WBC 18.38* 13.50* 11.13   RBC 3.81* 3.75* 3.91*   HGB 9.5* 9.5* 10.0*   HCT 32.0* 31.7* 32.6*   * 108* 85*   MCV 84 85 83   MCH 24.9* 25.3* 25.6*   MCHC 29.7* 30.0* 30.7*     BMP:   Recent Labs   Lab 05/24/24  0540 05/24/24  1304 05/25/24  0554   * 85 192*    140 142   K 4.7 4.6 4.8    108 112*   CO2 21* 23 21* "   BUN 40* 38* 29*   CREATININE 4.2* 3.2* 2.0*   CALCIUM 6.6* 7.4* 8.2*       Diagnostic Results:  None  Assessment/Plan:     * S/P kidney transplant  - Making good urine output with renal clearance  - MARIXA drain x 1 with thin bloody drainage  - Incision clean dry intact with staples in place  - Stop IVF  - DC Sheth Catheter.  Likely DC MARIXA drain tomorrow 5/26.  - Encourage ambulation. Encourage IS      Hypocalcemia  - Give IV Calcium  - Start po calcium replacement  - Monitor with daily labs      Acute blood loss anemia  - Expected post operatively  - H/H stable  - Plan for repeat at noon  - Monitor with daily cbc      Prophylactic immunotherapy  - See long term use of immunosuppressant medication      Long-term use of immunosuppressant medication  - Continue prograf, cellcept, steroids. Monitor prograf level daily, monitor for toxic side effects, and adjust for therapeutic dose      Anemia of chronic disease  - See acute blood loss anemia      Controlled type 2 diabetes mellitus with stable proliferative retinopathy of both eyes, unspecified whether long term insulin use  - Endocrine consulted for help with management. Appreciate their assistance      Class 2 severe obesity due to excess calories with serious comorbidity and body mass index (BMI) of 35.0 to 35.9 in adult  - Dietician consulted for help with management. Appreciate their assistance      Factor XI deficiency  - 1 unit of FFP given in OR.  A second unit of FFP given POD#1.  - Will receive standard DVT prophylaxis while inpatient then prophylaxis lovenox x 2 weeks on discharge.       Renovascular hypertension  - Hypertensive overnight.  - Uptitrate Nifedipine and Coreg          Discharge Planning:  Not medically ready for discharge  Medical decision making for this encounter includes review of pertinent labs and diagnostic studies, assessment and planning, discussions with consulting providers, discussion with patient/family, and participation in  multidisciplinary rounds. Time spent caring for patient: 30 minutes    DEJUAN BAY MD  Kidney Transplant  [facility] - Transplant Step[facility]

## 2024-05-25 NOTE — PLAN OF CARE
Pt has call bell in reach, non slip socks on, and bedrails up x2. She has family at bedside attentive to pt. Pt self meds performed well. Pt is allergic to betadine, so none applied to incision. Pt is encouraged to drink fluids. She has receive FFP today x1 dose. She is currently on thymo number 3 tolerating well. Pt has prn pain meds that we are giving. She is ac/hs with endocrine following

## 2024-05-25 NOTE — SUBJECTIVE & OBJECTIVE
Subjective:   History of Present Illness:  Ms. 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 She has a history of CVA in 2017 without any deficit. She also has factor XII deficiency, history of hep C (s/p treatment with harvoni. Seen by hepatology with fibroscan performed showing F0-F1), and pHTN (RHC performed on 6/7/23 and found to have group 2 HTN. TTE and cardiac PET with preserved EF)    Ms. Quezada is a 64 y.o. year old female who is status post Kidney Transplant - 5/23/2024  (#1).  Her maintenance immunosuppression consists of:   Immunosuppressants (From admission, onward)      Start     Stop Route Frequency Ordered    05/24/24 1800  tacrolimus capsule 4 mg  (IP TXP KIDNEY POST-OP THYMO WITH PERIPHERAL PRECHECKED)         -- Oral 2 times daily 05/24/24 1448    05/24/24 1200  antithymocyte globulin (rabbit) 125 mg, hydrocortisone sodium succinate (SOLU-CORTEF) 20 mg in sodium chloride 0.9% 500 mL (FOR PERIPHERAL LINE ADMINISTRATION ONLY)  (IP TXP KIDNEY POST-OP THYMO WITH PERIPHERAL PRECHECKED)         05/26/24 1159 IV Daily 05/23/24 1416    05/23/24 2200  mycophenolate capsule 1,000 mg  (IP TXP KIDNEY POST-OP THYMO WITH PERIPHERAL PRECHECKED)         -- Oral 2 times daily 05/23/24 1416    05/23/24 1045  antithymocyte globulin (rabbit) 125 mg, hydrocortisone sodium succinate (SOLU-CORTEF) 20 mg in sodium chloride 0.9% 500 mL (FOR PERIPHERAL LINE ADMINISTRATION ONLY)         -- IV Once 05/23/24 0938                Hospital Course:  She is now s/p living related kidney transplant on 5/23/34. Surgery without complication. 2 arteries reconstructed side-to-side. 2 day MARIXA mera x 1 to monitor for bleeding. Due to factor XII deficiency she received 1 unit of FFP in OR and will received 1 unit FFP POD#1. She will receive standard DVT prophylaxis while inpatient then prophylaxis  lovenox x 2 weeks on discharge.     Interval History: No acute events overnight.  This AM, patient states she feels well.  Good urine output and Cr downtrending.  Ambulated yesterday.  Passed flatus but no BM.  Tolerating PO intake.  Pain well controled.      Past Medical, Surgical, Family, and Social History:   Unchanged from H&P.    Scheduled Meds:   acetaminophen  650 mg Oral Q8H    acetaminophen  650 mg Oral Q24H    antithymocyte globulin (rabbit) 125 mg, hydrocortisone sodium succinate (SOLU-CORTEF) 20 mg in sodium chloride 0.9% 500 mL (FOR PERIPHERAL LINE ADMINISTRATION ONLY)  125 mg Intravenous Once    antithymocyte globulin (rabbit) 125 mg, hydrocortisone sodium succinate (SOLU-CORTEF) 20 mg in sodium chloride 0.9% 500 mL (FOR PERIPHERAL LINE ADMINISTRATION ONLY)  1.5 mg/kg (Adjusted) Intravenous Daily    atovaquone  1,500 mg Oral Daily    bisacodyL  10 mg Oral QHS    calcium carbonate  500 mg Oral BID    carvediloL  6.25 mg Oral BID    diphenhydrAMINE  25 mg Oral Once    diphenhydrAMINE  25 mg Oral Q24H    docusate sodium  100 mg Oral TID    EScitalopram oxalate  10 mg Oral QHS    famotidine  20 mg Oral QHS    heparin (porcine)  5,000 Units Subcutaneous Q8H    levothyroxine  200 mcg Oral Before breakfast    methylPREDNISolone sodium succinate injection  5 mg/kg Intravenous Once    methylPREDNISolone sodium succinate injection  125 mg Intravenous Once    multivitamin  1 tablet Oral Daily    mupirocin  1 g Nasal BID    mycophenolate  1,000 mg Oral BID    NIFEdipine  30 mg Oral Daily    [START ON 5/26/2024] predniSONE  20 mg Oral Daily    tacrolimus  4 mg Oral BID    [START ON 6/2/2024] valGANciclovir  450 mg Oral Daily AM     Continuous Infusions:   glucagon (human recombinant)  1 mg Intramuscular Continuous PRN         PRN Meds:  Current Facility-Administered Medications:     0.9%  NaCl infusion (for blood administration), , Intravenous, Q24H PRN    0.9%  NaCl infusion (for blood administration), ,  Intravenous, Q24H PRN    cloNIDine, 0.1 mg, Oral, Q6H PRN    dextrose 10%, 12.5 g, Intravenous, PRN    dextrose 10%, 12.5 g, Intravenous, PRN    dextrose 10%, 25 g, Intravenous, PRN    dextrose 10%, 25 g, Intravenous, PRN    diphenhydrAMINE, 25 mg, Oral, Q6H PRN    diphenhydrAMINE, 50 mg, Oral, Once PRN    EPINEPHrine (PF), 1 mg, Subcutaneous, Once PRN    EPINEPHrine (PF), 1 mg, Subcutaneous, Once PRN    glucagon (human recombinant), 1 mg, Intramuscular, Continuous PRN    glucagon (human recombinant), 1 mg, Intramuscular, PRN    glucose, 16 g, Oral, PRN    glucose, 16 g, Oral, PRN    glucose, 16 g, Oral, PRN    glucose, 24 g, Oral, PRN    glucose, 24 g, Oral, PRN    hydrocortisone sodium succinate, 100 mg, Intravenous, Once PRN    hydrocortisone sodium succinate, 100 mg, Intravenous, Once PRN    insulin aspart U-100, 0-10 Units, Subcutaneous, QID (AC + HS) PRN    ondansetron, 4 mg, Intravenous, Q6H PRN    oxyCODONE, 5 mg, Oral, Q6H PRN    sodium chloride 0.9%, 10 mL, Intravenous, PRN    traMADoL, 50 mg, Oral, Q6H PRN    traZODone, 50 mg, Oral, Nightly PRN    Intake/Output - Last 3 Shifts         05/23 0700  05/24 0659 05/24 0700 05/25 0659 05/25 0700 05/26 0659    P.O. 1160 840     I.V. (mL/kg) 2018 (18.9) 1827.8 (16.4)     Blood 7966.7 233.3     IV Piggyback 2218.9 50     Total Intake(mL/kg) 90977.6 (125.4) 2951.1 (26.5)     Urine (mL/kg/hr) 3930 (1.5) 5930 (2.2)     Drains 180 290     Total Output 4110 6220     Net +9253.6 -3268.9            Stool Occurrence   1 x             Review of Systems   Constitutional:  Negative for activity change and appetite change.   Respiratory:  Negative for shortness of breath.    Gastrointestinal:  Positive for abdominal pain. Negative for abdominal distention, nausea and vomiting.   Genitourinary:  Negative for decreased urine volume and hematuria.   Skin:  Positive for wound.   Allergic/Immunologic: Positive for immunocompromised state.   Psychiatric/Behavioral:  Negative for  "confusion and decreased concentration.       Objective:     Vital Signs (Most Recent):  Temp: 97.6 °F (36.4 °C) (05/25/24 0725)  Pulse: 83 (05/25/24 0725)  Resp: 18 (05/25/24 0725)  BP: (!) 170/79 (05/25/24 0740)  SpO2: 97 % (05/25/24 0725) Vital Signs (24h Range):  Temp:  [97.6 °F (36.4 °C)-98.7 °F (37.1 °C)] 97.6 °F (36.4 °C)  Pulse:  [77-90] 83  Resp:  [14-18] 18  SpO2:  [90 %-100 %] 97 %  BP: (131-211)/(57-88) 170/79     Weight: 111.4 kg (245 lb 7.7 oz)  Height: 5' 9" (175.3 cm)  Body mass index is 36.25 kg/m².     Physical Exam  Vitals and nursing note reviewed.   Cardiovascular:      Rate and Rhythm: Normal rate.   Pulmonary:      Effort: Pulmonary effort is normal.   Abdominal:      General: There is no distension.      Tenderness: There is abdominal tenderness. There is no guarding or rebound.      Comments: Kidney incision clean dry intact with staples in place  MARIXA drain x 1 with thin bloody drainage   Neurological:      Mental Status: She is alert and oriented to person, place, and time.   Psychiatric:         Mood and Affect: Mood normal.         Behavior: Behavior normal.         Thought Content: Thought content normal.         Judgment: Judgment normal.          Laboratory:  CBC:   Recent Labs   Lab 05/24/24  0540 05/24/24  1304 05/25/24  0554   WBC 18.38* 13.50* 11.13   RBC 3.81* 3.75* 3.91*   HGB 9.5* 9.5* 10.0*   HCT 32.0* 31.7* 32.6*   * 108* 85*   MCV 84 85 83   MCH 24.9* 25.3* 25.6*   MCHC 29.7* 30.0* 30.7*     BMP:   Recent Labs   Lab 05/24/24  0540 05/24/24  1304 05/25/24  0554   * 85 192*    140 142   K 4.7 4.6 4.8    108 112*   CO2 21* 23 21*   BUN 40* 38* 29*   CREATININE 4.2* 3.2* 2.0*   CALCIUM 6.6* 7.4* 8.2*       Diagnostic Results:  None  "

## 2024-05-25 NOTE — PLAN OF CARE
Pt AAOx4. -180s, daily Nifedepine added to regimen. All other VSS.  SpO2 >95% on RA. Pt with RLQ incision, DARA with staples CDI., CDI. RLQ MARIXA in place with sanguineous output--see flowsheet for details. Sheth intact draining clear yellow urine. REBECCA fistula +/+. PRN oxycodone administered for pain. Trazodone administered for insomnia, pt with some rest tonight. Thymo #2 completed, pt tolerated well. BG monitored AC/HS, SSI administered per MAR. Pt weight shifting, practicing deep breathing. Pt up to chair and walked in halls with standby assist, steady on feet, non-skid socks in place, remains free from fals/injury.Bed in lowest locked position, call light within reach, POC ongoing.

## 2024-05-26 LAB
ALBUMIN SERPL BCP-MCNC: 2.7 G/DL (ref 3.5–5.2)
ANION GAP SERPL CALC-SCNC: 5 MMOL/L (ref 8–16)
BASOPHILS # BLD AUTO: 0.01 K/UL (ref 0–0.2)
BASOPHILS NFR BLD: 0.1 % (ref 0–1.9)
BUN SERPL-MCNC: 22 MG/DL (ref 8–23)
CALCIUM SERPL-MCNC: 8.9 MG/DL (ref 8.7–10.5)
CHLORIDE SERPL-SCNC: 111 MMOL/L (ref 95–110)
CO2 SERPL-SCNC: 26 MMOL/L (ref 23–29)
CREAT SERPL-MCNC: 1.2 MG/DL (ref 0.5–1.4)
DIFFERENTIAL METHOD BLD: ABNORMAL
EOSINOPHIL # BLD AUTO: 0 K/UL (ref 0–0.5)
EOSINOPHIL NFR BLD: 0.1 % (ref 0–8)
ERYTHROCYTE [DISTWIDTH] IN BLOOD BY AUTOMATED COUNT: 17.3 % (ref 11.5–14.5)
EST. GFR  (NO RACE VARIABLE): 51.2 ML/MIN/1.73 M^2
GLUCOSE SERPL-MCNC: 176 MG/DL (ref 70–110)
HCT VFR BLD AUTO: 30 % (ref 37–48.5)
HGB BLD-MCNC: 9.4 G/DL (ref 12–16)
IMM GRANULOCYTES # BLD AUTO: 0.07 K/UL (ref 0–0.04)
IMM GRANULOCYTES NFR BLD AUTO: 1 % (ref 0–0.5)
LYMPHOCYTES # BLD AUTO: 0.3 K/UL (ref 1–4.8)
LYMPHOCYTES NFR BLD: 4.1 % (ref 18–48)
MAGNESIUM SERPL-MCNC: 2 MG/DL (ref 1.6–2.6)
MCH RBC QN AUTO: 25.4 PG (ref 27–31)
MCHC RBC AUTO-ENTMCNC: 31.3 G/DL (ref 32–36)
MCV RBC AUTO: 81 FL (ref 82–98)
MONOCYTES # BLD AUTO: 0.4 K/UL (ref 0.3–1)
MONOCYTES NFR BLD: 6.1 % (ref 4–15)
NEUTROPHILS # BLD AUTO: 6.4 K/UL (ref 1.8–7.7)
NEUTROPHILS NFR BLD: 88.6 % (ref 38–73)
NRBC BLD-RTO: 0 /100 WBC
PHOSPHATE SERPL-MCNC: 2.2 MG/DL (ref 2.7–4.5)
PLATELET # BLD AUTO: 67 K/UL (ref 150–450)
PMV BLD AUTO: 12.4 FL (ref 9.2–12.9)
POCT GLUCOSE: 129 MG/DL (ref 70–110)
POCT GLUCOSE: 169 MG/DL (ref 70–110)
POCT GLUCOSE: 228 MG/DL (ref 70–110)
POCT GLUCOSE: 285 MG/DL (ref 70–110)
POTASSIUM SERPL-SCNC: 4.6 MMOL/L (ref 3.5–5.1)
RBC # BLD AUTO: 3.7 M/UL (ref 4–5.4)
SODIUM SERPL-SCNC: 142 MMOL/L (ref 136–145)
TACROLIMUS BLD-MCNC: 4.4 NG/ML (ref 5–15)
WBC # BLD AUTO: 7.25 K/UL (ref 3.9–12.7)

## 2024-05-26 PROCEDURE — 27000207 HC ISOLATION: Mod: HCNC

## 2024-05-26 PROCEDURE — 25000003 PHARM REV CODE 250: Mod: HCNC | Performed by: STUDENT IN AN ORGANIZED HEALTH CARE EDUCATION/TRAINING PROGRAM

## 2024-05-26 PROCEDURE — 20600001 HC STEP DOWN PRIVATE ROOM: Mod: HCNC

## 2024-05-26 PROCEDURE — 83735 ASSAY OF MAGNESIUM: CPT | Mod: HCNC | Performed by: STUDENT IN AN ORGANIZED HEALTH CARE EDUCATION/TRAINING PROGRAM

## 2024-05-26 PROCEDURE — 63600175 PHARM REV CODE 636 W HCPCS: Mod: HCNC | Performed by: PHYSICIAN ASSISTANT

## 2024-05-26 PROCEDURE — 25000003 PHARM REV CODE 250: Mod: HCNC | Performed by: PHYSICIAN ASSISTANT

## 2024-05-26 PROCEDURE — 63600175 PHARM REV CODE 636 W HCPCS: Mod: HCNC | Performed by: STUDENT IN AN ORGANIZED HEALTH CARE EDUCATION/TRAINING PROGRAM

## 2024-05-26 PROCEDURE — 25000003 PHARM REV CODE 250: Mod: HCNC | Performed by: TRANSPLANT SURGERY

## 2024-05-26 PROCEDURE — 80069 RENAL FUNCTION PANEL: CPT | Mod: HCNC | Performed by: STUDENT IN AN ORGANIZED HEALTH CARE EDUCATION/TRAINING PROGRAM

## 2024-05-26 PROCEDURE — 85025 COMPLETE CBC W/AUTO DIFF WBC: CPT | Mod: HCNC | Performed by: STUDENT IN AN ORGANIZED HEALTH CARE EDUCATION/TRAINING PROGRAM

## 2024-05-26 PROCEDURE — 99232 SBSQ HOSP IP/OBS MODERATE 35: CPT | Mod: HCNC,,,

## 2024-05-26 PROCEDURE — 25000003 PHARM REV CODE 250: Mod: HCNC | Performed by: INTERNAL MEDICINE

## 2024-05-26 PROCEDURE — 63600175 PHARM REV CODE 636 W HCPCS: Mod: HCNC | Performed by: NURSE PRACTITIONER

## 2024-05-26 PROCEDURE — 63600175 PHARM REV CODE 636 W HCPCS: Mod: HCNC

## 2024-05-26 PROCEDURE — 80197 ASSAY OF TACROLIMUS: CPT | Mod: HCNC | Performed by: STUDENT IN AN ORGANIZED HEALTH CARE EDUCATION/TRAINING PROGRAM

## 2024-05-26 PROCEDURE — 94761 N-INVAS EAR/PLS OXIMETRY MLT: CPT | Mod: HCNC

## 2024-05-26 RX ORDER — METHOCARBAMOL 500 MG/1
500 TABLET, FILM COATED ORAL 4 TIMES DAILY
Status: DISCONTINUED | OUTPATIENT
Start: 2024-05-26 | End: 2024-05-27 | Stop reason: HOSPADM

## 2024-05-26 RX ORDER — OXYCODONE HYDROCHLORIDE 10 MG/1
10 TABLET ORAL EVERY 6 HOURS PRN
Status: CANCELLED | OUTPATIENT
Start: 2024-05-26

## 2024-05-26 RX ORDER — PANTOPRAZOLE SODIUM 40 MG/1
40 TABLET, DELAYED RELEASE ORAL DAILY
Status: CANCELLED | OUTPATIENT
Start: 2024-05-26

## 2024-05-26 RX ORDER — OXYCODONE HYDROCHLORIDE 5 MG/1
5 TABLET ORAL EVERY 6 HOURS PRN
Status: CANCELLED | OUTPATIENT
Start: 2024-05-26

## 2024-05-26 RX ORDER — TACROLIMUS 1 MG/1
6 CAPSULE ORAL 2 TIMES DAILY
Status: CANCELLED | OUTPATIENT
Start: 2024-05-26

## 2024-05-26 RX ORDER — METHOCARBAMOL 500 MG/1
500 TABLET, FILM COATED ORAL 4 TIMES DAILY
Status: CANCELLED | OUTPATIENT
Start: 2024-05-26

## 2024-05-26 RX ORDER — TACROLIMUS 1 MG/1
2 CAPSULE ORAL ONCE
Status: CANCELLED | OUTPATIENT
Start: 2024-05-26 | End: 2024-05-26

## 2024-05-26 RX ORDER — HEPARIN 100 UNIT/ML
5 SYRINGE INTRAVENOUS
Status: DISCONTINUED | OUTPATIENT
Start: 2024-05-26 | End: 2024-05-27 | Stop reason: HOSPADM

## 2024-05-26 RX ADMIN — INSULIN ASPART 3 UNITS: 100 INJECTION, SOLUTION INTRAVENOUS; SUBCUTANEOUS at 09:05

## 2024-05-26 RX ADMIN — OXYCODONE 5 MG: 5 TABLET ORAL at 04:05

## 2024-05-26 RX ADMIN — MUPIROCIN 1 G: 20 OINTMENT TOPICAL at 09:05

## 2024-05-26 RX ADMIN — OXYCODONE 5 MG: 5 TABLET ORAL at 05:05

## 2024-05-26 RX ADMIN — CARVEDILOL 6.25 MG: 6.25 TABLET, FILM COATED ORAL at 08:05

## 2024-05-26 RX ADMIN — NIFEDIPINE 30 MG: 30 TABLET, FILM COATED, EXTENDED RELEASE ORAL at 08:05

## 2024-05-26 RX ADMIN — CARVEDILOL 6.25 MG: 6.25 TABLET, FILM COATED ORAL at 10:05

## 2024-05-26 RX ADMIN — TACROLIMUS 4 MG: 1 CAPSULE ORAL at 08:05

## 2024-05-26 RX ADMIN — INSULIN ASPART 2 UNITS: 100 INJECTION, SOLUTION INTRAVENOUS; SUBCUTANEOUS at 10:05

## 2024-05-26 RX ADMIN — METHOCARBAMOL 500 MG: 500 TABLET ORAL at 01:05

## 2024-05-26 RX ADMIN — ATOVAQUONE 1500 MG: 750 SUSPENSION ORAL at 08:05

## 2024-05-26 RX ADMIN — CALCIUM CARBONATE (ANTACID) CHEW TAB 500 MG 500 MG: 500 CHEW TAB at 08:05

## 2024-05-26 RX ADMIN — LEVOTHYROXINE SODIUM 200 MCG: 100 TABLET ORAL at 06:05

## 2024-05-26 RX ADMIN — MYCOPHENOLATE MOFETIL 1000 MG: 250 CAPSULE ORAL at 09:05

## 2024-05-26 RX ADMIN — HEPARIN SODIUM 5000 UNITS: 5000 INJECTION INTRAVENOUS; SUBCUTANEOUS at 01:05

## 2024-05-26 RX ADMIN — THERA TABS 1 TABLET: TAB at 08:05

## 2024-05-26 RX ADMIN — PREDNISONE 20 MG: 20 TABLET ORAL at 08:05

## 2024-05-26 RX ADMIN — TRAMADOL HYDROCHLORIDE 50 MG: 50 TABLET, COATED ORAL at 08:05

## 2024-05-26 RX ADMIN — DIPHENHYDRAMINE HYDROCHLORIDE 25 MG: 25 CAPSULE ORAL at 09:05

## 2024-05-26 RX ADMIN — METHOCARBAMOL 500 MG: 500 TABLET ORAL at 05:05

## 2024-05-26 RX ADMIN — HEPARIN SODIUM 5000 UNITS: 5000 INJECTION INTRAVENOUS; SUBCUTANEOUS at 09:05

## 2024-05-26 RX ADMIN — INSULIN ASPART 6 UNITS: 100 INJECTION, SOLUTION INTRAVENOUS; SUBCUTANEOUS at 05:05

## 2024-05-26 RX ADMIN — TACROLIMUS 4 MG: 1 CAPSULE ORAL at 05:05

## 2024-05-26 RX ADMIN — METHOCARBAMOL 500 MG: 500 TABLET ORAL at 09:05

## 2024-05-26 RX ADMIN — CALCIUM CARBONATE (ANTACID) CHEW TAB 500 MG 500 MG: 500 CHEW TAB at 09:05

## 2024-05-26 RX ADMIN — HEPARIN SODIUM 5000 UNITS: 5000 INJECTION INTRAVENOUS; SUBCUTANEOUS at 06:05

## 2024-05-26 RX ADMIN — INSULIN ASPART 3 UNITS: 100 INJECTION, SOLUTION INTRAVENOUS; SUBCUTANEOUS at 05:05

## 2024-05-26 RX ADMIN — MYCOPHENOLATE MOFETIL 1000 MG: 250 CAPSULE ORAL at 08:05

## 2024-05-26 RX ADMIN — DIPHENHYDRAMINE HYDROCHLORIDE 25 MG: 25 CAPSULE ORAL at 08:05

## 2024-05-26 RX ADMIN — DOCUSATE SODIUM 100 MG: 100 CAPSULE, LIQUID FILLED ORAL at 08:05

## 2024-05-26 RX ADMIN — ESCITALOPRAM OXALATE 10 MG: 10 TABLET ORAL at 09:05

## 2024-05-26 RX ADMIN — HEPARIN 500 UNITS: 100 SYRINGE at 05:05

## 2024-05-26 NOTE — PLAN OF CARE
Pt has call bell in reach, non slip socks on, and bedrails up x2. She has family at bedside attentive to pt. Pt self meds performed well. Pt is allergic to betadine, so none applied to incision. Pt is encouraged to drink fluids. Pt has prn pain meds that we are giving. She is ac/hs with endocrine following. Robaxin has been added for pain and cramping.

## 2024-05-26 NOTE — PROGRESS NOTES
[facility]  Kidney Transplant  Progress Note      Reason for Follow-up: Reassessment of Kidney Transplant - 5/23/2024  (#1) recipient and management of immunosuppression.    ORGAN: LEFT KIDNEY      Donor Type: Living      Donor CMV Status:    Donor HBcAB:   Donor HCV Status:   Donor HBV MELISA:   Donor HCV MELISA:       Subjective:   History of Present Illness:  Ms. 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 She has a history of CVA in 2017 without any deficit. She also has factor XII deficiency, history of hep C (s/p treatment with harvoni. Seen by hepatology with fibroscan performed showing F0-F1), and pHTN (RHC performed on 6/7/23 and found to have group 2 HTN. TTE and cardiac PET with preserved EF)    Ms. Quezada is a 64 y.o. year old female who is status post Kidney Transplant - 5/23/2024  (#1).  Her maintenance immunosuppression consists of:   Immunosuppressants (From admission, onward)      Start     Stop Route Frequency Ordered    05/24/24 1800  tacrolimus capsule 4 mg  (IP TXP KIDNEY POST-OP THYMO WITH PERIPHERAL PRECHECKED)         -- Oral 2 times daily 05/24/24 1448    05/23/24 2200  mycophenolate capsule 1,000 mg  (IP TXP KIDNEY POST-OP THYMO WITH PERIPHERAL PRECHECKED)         -- Oral 2 times daily 05/23/24 1416    05/23/24 1045  antithymocyte globulin (rabbit) 125 mg, hydrocortisone sodium succinate (SOLU-CORTEF) 20 mg in sodium chloride 0.9% 500 mL (FOR PERIPHERAL LINE ADMINISTRATION ONLY)         -- IV Once 05/23/24 0938                Hospital Course:  She is now s/p living related kidney transplant on 5/23/34. Surgery without complication. 2 arteries reconstructed side-to-side. 2 day MARIXA mera x 1 to monitor for bleeding. Due to factor XII deficiency she received 1 unit of FFP in OR and will received 1 unit FFP POD#1. She will receive standard DVT  prophylaxis while inpatient then prophylaxis lovenox x 2 weeks on discharge.     Interval History: No acute events overnight.  This AM, patient complains of worse incisional pain.  States she had to get up multiple times overnight to urinate and the extra movement may have made her pain worse.  Good urine output and Cr downtrending.  Passed flatus and had a BM.  Tolerating PO intake.        Past Medical, Surgical, Family, and Social History:   Unchanged from H&P.    Scheduled Meds:   antithymocyte globulin (rabbit) 125 mg, hydrocortisone sodium succinate (SOLU-CORTEF) 20 mg in sodium chloride 0.9% 500 mL (FOR PERIPHERAL LINE ADMINISTRATION ONLY)  125 mg Intravenous Once    atovaquone  1,500 mg Oral Daily    bisacodyL  10 mg Oral QHS    calcium carbonate  500 mg Oral BID    carvediloL  6.25 mg Oral BID    diphenhydrAMINE  25 mg Oral Once    docusate sodium  100 mg Oral TID    EScitalopram oxalate  10 mg Oral QHS    heparin (porcine)  5,000 Units Subcutaneous Q8H    insulin aspart U-100  3 Units Subcutaneous TIDWM    levothyroxine  200 mcg Oral Before breakfast    methylPREDNISolone sodium succinate injection  5 mg/kg Intravenous Once    multivitamin  1 tablet Oral Daily    mupirocin  1 g Nasal BID    mycophenolate  1,000 mg Oral BID    NIFEdipine  30 mg Oral Daily    predniSONE  20 mg Oral Daily    tacrolimus  4 mg Oral BID    [START ON 6/2/2024] valGANciclovir  450 mg Oral Daily AM     Continuous Infusions:   glucagon (human recombinant)  1 mg Intramuscular Continuous PRN         PRN Meds:  Current Facility-Administered Medications:     0.9%  NaCl infusion (for blood administration), , Intravenous, Q24H PRN    0.9%  NaCl infusion (for blood administration), , Intravenous, Q24H PRN    cloNIDine, 0.1 mg, Oral, Q6H PRN    dextrose 10%, 12.5 g, Intravenous, PRN    dextrose 10%, 12.5 g, Intravenous, PRN    dextrose 10%, 25 g, Intravenous, PRN    dextrose 10%, 25 g, Intravenous, PRN    diphenhydrAMINE, 25 mg, Oral, Q6H  PRN    EPINEPHrine (PF), 1 mg, Subcutaneous, Once PRN    EPINEPHrine (PF), 1 mg, Subcutaneous, Once PRN    glucagon (human recombinant), 1 mg, Intramuscular, Continuous PRN    glucagon (human recombinant), 1 mg, Intramuscular, PRN    glucose, 16 g, Oral, PRN    glucose, 16 g, Oral, PRN    glucose, 16 g, Oral, PRN    glucose, 24 g, Oral, PRN    glucose, 24 g, Oral, PRN    heparin, porcine (PF), 5 mL, Intravenous, PRN    hydrocortisone sodium succinate, 100 mg, Intravenous, Once PRN    hydrocortisone sodium succinate, 100 mg, Intravenous, Once PRN    insulin aspart U-100, 0-10 Units, Subcutaneous, QID (AC + HS) PRN    ondansetron, 4 mg, Intravenous, Q6H PRN    oxyCODONE, 5 mg, Oral, Q6H PRN    sodium chloride 0.9%, 10 mL, Intravenous, PRN    traZODone, 50 mg, Oral, Nightly PRN    Intake/Output - Last 3 Shifts         05/24 0700 05/25 0659 05/25 0700 05/26 0659 05/26 0700 05/27 0659    P.O. 840 1680 380    I.V. (mL/kg) 1827.8 (16.4)      Blood 453.3      IV Piggyback 537.6 335.5     Total Intake(mL/kg) 3658.7 (32.9) 2015.5 (18.1) 380 (3.4)    Urine (mL/kg/hr) 5930 (2.2) 3550 (1.3) 300 (0.7)    Drains 290 305     Stool  0     Total Output 6220 3855 300    Net -2561.4 -1839.5 +80           Stool Occurrence  2 x              Review of Systems   Constitutional:  Negative for activity change and appetite change.   Respiratory:  Negative for shortness of breath.    Gastrointestinal:  Positive for abdominal pain. Negative for abdominal distention, nausea and vomiting.   Genitourinary:  Negative for decreased urine volume and hematuria.   Skin:  Positive for wound.   Allergic/Immunologic: Positive for immunocompromised state.   Psychiatric/Behavioral:  Negative for confusion and decreased concentration.       Objective:     Vital Signs (Most Recent):  Temp: 98.6 °F (37 °C) (05/26/24 0740)  Pulse: 87 (05/26/24 0740)  Resp: 16 (05/26/24 0837)  BP: (!) 143/62 (05/26/24 0740)  SpO2: 97 % (05/26/24 0740) Vital Signs (24h  "Range):  Temp:  [98 °F (36.7 °C)-98.6 °F (37 °C)] 98.6 °F (37 °C)  Pulse:  [82-91] 87  Resp:  [15-18] 16  SpO2:  [93 %-99 %] 97 %  BP: (120-181)/(59-94) 143/62     Weight: 111.4 kg (245 lb 7.7 oz)  Height: 5' 9" (175.3 cm)  Body mass index is 36.25 kg/m².     Physical Exam  Vitals and nursing note reviewed.   Cardiovascular:      Rate and Rhythm: Normal rate.   Pulmonary:      Effort: Pulmonary effort is normal.   Abdominal:      General: There is no distension.      Tenderness: There is abdominal tenderness. There is no guarding or rebound.      Comments: Kidney incision clean dry intact with staples in place  MARIXA drain x 1 with thin bloody drainage   Genitourinary:     Comments: Sheth removed  Neurological:      Mental Status: She is alert and oriented to person, place, and time.   Psychiatric:         Mood and Affect: Mood normal.         Behavior: Behavior normal.         Thought Content: Thought content normal.         Judgment: Judgment normal.          Laboratory:  CBC:   Recent Labs   Lab 05/24/24  1304 05/25/24  0554 05/26/24  0515   WBC 13.50* 11.13 7.25   RBC 3.75* 3.91* 3.70*   HGB 9.5* 10.0* 9.4*   HCT 31.7* 32.6* 30.0*   * 85* 67*   MCV 85 83 81*   MCH 25.3* 25.6* 25.4*   MCHC 30.0* 30.7* 31.3*     BMP:   Recent Labs   Lab 05/24/24  1304 05/25/24  0554 05/26/24  0515   GLU 85 192* 176*    142 142   K 4.6 4.8 4.6    112* 111*   CO2 23 21* 26   BUN 38* 29* 22   CREATININE 3.2* 2.0* 1.2   CALCIUM 7.4* 8.2* 8.9       Diagnostic Results:  None  Assessment/Plan:     * S/P kidney transplant  - Making good urine output with renal clearance  - MARIXA drain x 1 with thin bloody drainage  - Incision clean dry intact with staples in place  - Sheth Catheter removed 5/25.    - Keep drain today.  likely DC MARIXA drain tomorrow 5/27.  - Adding robaxin for pain control  - Encourage ambulation. Encourage IS      Hypocalcemia  - Give IV Calcium  - Start po calcium replacement  - Monitor with daily " labs      Acute blood loss anemia  - Expected post operatively  - H/H stable  - Monitor with daily cbc      Prophylactic immunotherapy  - See long term use of immunosuppressant medication      Long-term use of immunosuppressant medication  - Continue prograf, cellcept, steroids. Monitor prograf level daily, monitor for toxic side effects, and adjust for therapeutic dose      Anemia of chronic disease  - See acute blood loss anemia      Controlled type 2 diabetes mellitus with stable proliferative retinopathy of both eyes, unspecified whether long term insulin use  - Endocrine consulted for help with management. Appreciate their assistance      Class 2 severe obesity due to excess calories with serious comorbidity and body mass index (BMI) of 35.0 to 35.9 in adult  - Dietician consulted for help with management. Appreciate their assistance      Factor XI deficiency  - 1 unit of FFP given in OR.  A second unit of FFP given POD#1.  - Will receive standard DVT prophylaxis while inpatient then prophylaxis lovenox x 2 weeks on discharge.       Renovascular hypertension  - Hypertensive overnight.  - Uptitrate Nifedipine and Coreg          Discharge Planning:  Not medically ready for discharge  Medical decision making for this encounter includes review of pertinent labs and diagnostic studies, assessment and planning, discussions with consulting providers, discussion with patient/family, and participation in multidisciplinary rounds. Time spent caring for patient: 30 minutes    DEJUAN BAY MD  Kidney Transplant  [facility] - Transplant Step[facility]

## 2024-05-26 NOTE — PLAN OF CARE
AAOx4. VSS. Afebrile. On RA. Pt reported feeling too unwell to pull self meds. Oxy given for incisional painX2. MARIXA drained x2. Pt urinating clear yellow urine into hat. I&Os measured. Accu ACHS. Insulin coverage given. Personal items within reach. Bed in low position. Bed wheels locked. Side rails raisedx2. Call light in reach.

## 2024-05-26 NOTE — ASSESSMENT & PLAN NOTE
- Making good urine output with renal clearance  - MARIXA drain x 1 with thin bloody drainage  - Incision clean dry intact with staples in place  - Sheth Catheter removed 5/25.    - Keep drain today.  likely DC MARIXA drain tomorrow 5/27.  - Adding robaxin for pain control  - Encourage ambulation. Encourage IS

## 2024-05-26 NOTE — ASSESSMENT & PLAN NOTE
BG goal: 140-180    - Start novolog 3 units TIDWM given post prandial BG excursions noted   - LDC (150/50) prn   - POCT glucose ac/hs  - POCT Glucose before meals and at bedtime  - Hypoglycemia protocol in place      ** Please notify Endocrine for any change and/or advance in diet**  ** Please call Endocrine for any BG related issues **     Discharge Planning:   TBD. Please notify endocrinology prior to discharge.

## 2024-05-26 NOTE — SUBJECTIVE & OBJECTIVE
"Interval HPI:   No acute events overnight. Patient in room 39441/81892 A. Blood glucose variable. BG at and above goal on current insulin regimen (SSI ). Steroid use- Prednisone 20 mg QD.   3 Days Post-Op  Renal function-   Lab Results   Component Value Date    CREATININE 1.2 2024        Vasopressors-  None     Diet Low Potassium     Eatin%  Nausea: No  Hypoglycemia and intervention: No  Fever: No  TPN and/or TF: No    BP (!) 143/62 (Patient Position: Lying)   Pulse 87   Temp 98.6 °F (37 °C) (Oral)   Resp 16   Ht 5' 9" (1.753 m)   Wt 111.4 kg (245 lb 7.7 oz)   SpO2 97%   Breastfeeding No   BMI 36.25 kg/m²     Labs Reviewed and Include    Recent Labs   Lab 24  0515   *   CALCIUM 8.9   ALBUMIN 2.7*      K 4.6   CO2 26   *   BUN 22   CREATININE 1.2     Lab Results   Component Value Date    WBC 7.25 2024    HGB 9.4 (L) 2024    HCT 30.0 (L) 2024    MCV 81 (L) 2024    PLT 67 (L) 2024     No results for input(s): "TSH", "FREET4" in the last 168 hours.  Lab Results   Component Value Date    HGBA1C 5.5 2024       Nutritional status:   Body mass index is 36.25 kg/m².  Lab Results   Component Value Date    ALBUMIN 2.7 (L) 2024    ALBUMIN 2.7 (L) 2024    ALBUMIN 2.5 (L) 2024     No results found for: "PREALBUMIN"    Estimated Creatinine Clearance: 64.7 mL/min (based on SCr of 1.2 mg/dL).    Accu-Checks  Recent Labs     24  0733 24  1054 24  1158 24  1456 24  1636 24  2056 24  1310 24  1810 24  2308 24  0831   POCTGLUCOSE 121* 209* 113* 147* 172* 235* 176* 308* 269* 129*       Current Medications and/or Treatments Impacting Glycemic Control  Immunotherapy:    Immunosuppressants           Stop Route Frequency     tacrolimus capsule 4 mg         -- Oral 2 times daily     mycophenolate capsule 1,000 mg         -- Oral 2 times daily     antithymocyte globulin (rabbit) 125 " mg, hydrocortisone sodium succinate (SOLU-CORTEF) 20 mg in sodium chloride 0.9% 500 mL (FOR PERIPHERAL LINE ADMINISTRATION ONLY)         -- IV Once          Steroids:   Hormones (From admission, onward)      Start     Stop Route Frequency Ordered    05/26/24 0900  predniSONE tablet 20 mg  (IP TXP KIDNEY POST-OP THYMO WITH PERIPHERAL PRECHECKED)         -- Oral Daily 05/23/24 1416    05/23/24 1515  hydrocortisone sodium succinate injection 100 mg  (IP TXP KIDNEY POST-OP THYMO WITH PERIPHERAL PRECHECKED)         10/20/35 0615 IV Once as needed 05/23/24 1416    05/23/24 1045  antithymocyte globulin (rabbit) 125 mg, hydrocortisone sodium succinate (SOLU-CORTEF) 20 mg in sodium chloride 0.9% 500 mL (FOR PERIPHERAL LINE ADMINISTRATION ONLY)         -- IV Once 05/23/24 0938    05/23/24 1045  methylPREDNISolone sodium succinate (SOLU-MEDROL) 533 mg in dextrose 5 % (D5W) 100 mL IVPB  (methylPREDNISolone)         -- IV Once 05/23/24 0938    05/23/24 1037  hydrocortisone sodium succinate injection 100 mg         10/20/35 0137 IV Once as needed 05/23/24 0938          Pressors:    Autonomic Drugs (From admission, onward)      Start     Stop Route Frequency Ordered    05/23/24 1515  EPINEPHrine (PF) injection 1 mg  (IP TXP KIDNEY POST-OP THYMO WITH PERIPHERAL PRECHECKED)         10/20/35 0615 SubQ Once as needed 05/23/24 1416    05/23/24 1037  EPINEPHrine (PF) injection 1 mg         10/20/35 0137 SubQ Once as needed 05/23/24 0938          Hyperglycemia/Diabetes Medications:   Antihyperglycemics (From admission, onward)      Start     Stop Route Frequency Ordered    05/26/24 0715  insulin aspart U-100 pen 3 Units         -- SubQ 3 times daily with meals 05/25/24 1925    05/25/24 0922  insulin aspart U-100 pen 0-10 Units         -- SubQ Before meals & nightly PRN 05/25/24 0923

## 2024-05-26 NOTE — SUBJECTIVE & OBJECTIVE
Subjective:   History of Present Illness:  Ms. 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 She has a history of CVA in 2017 without any deficit. She also has factor XII deficiency, history of hep C (s/p treatment with harvoni. Seen by hepatology with fibroscan performed showing F0-F1), and pHTN (RHC performed on 6/7/23 and found to have group 2 HTN. TTE and cardiac PET with preserved EF)    Ms. Quezada is a 64 y.o. year old female who is status post Kidney Transplant - 5/23/2024  (#1).  Her maintenance immunosuppression consists of:   Immunosuppressants (From admission, onward)      Start     Stop Route Frequency Ordered    05/24/24 1800  tacrolimus capsule 4 mg  (IP TXP KIDNEY POST-OP THYMO WITH PERIPHERAL PRECHECKED)         -- Oral 2 times daily 05/24/24 1448    05/23/24 2200  mycophenolate capsule 1,000 mg  (IP TXP KIDNEY POST-OP THYMO WITH PERIPHERAL PRECHECKED)         -- Oral 2 times daily 05/23/24 1416    05/23/24 1045  antithymocyte globulin (rabbit) 125 mg, hydrocortisone sodium succinate (SOLU-CORTEF) 20 mg in sodium chloride 0.9% 500 mL (FOR PERIPHERAL LINE ADMINISTRATION ONLY)         -- IV Once 05/23/24 0938                Hospital Course:  She is now s/p living related kidney transplant on 5/23/34. Surgery without complication. 2 arteries reconstructed side-to-side. 2 day MARIXA mera x 1 to monitor for bleeding. Due to factor XII deficiency she received 1 unit of FFP in OR and will received 1 unit FFP POD#1. She will receive standard DVT prophylaxis while inpatient then prophylaxis lovenox x 2 weeks on discharge.     Interval History: No acute events overnight.  This AM, patient complains of worse incisional pain.  States she had to get up multiple times overnight to urinate and the extra movement may have made her pain worse.  Good urine output and Cr downtrending.   Passed flatus and had a BM.  Tolerating PO intake.        Past Medical, Surgical, Family, and Social History:   Unchanged from H&P.    Scheduled Meds:   antithymocyte globulin (rabbit) 125 mg, hydrocortisone sodium succinate (SOLU-CORTEF) 20 mg in sodium chloride 0.9% 500 mL (FOR PERIPHERAL LINE ADMINISTRATION ONLY)  125 mg Intravenous Once    atovaquone  1,500 mg Oral Daily    bisacodyL  10 mg Oral QHS    calcium carbonate  500 mg Oral BID    carvediloL  6.25 mg Oral BID    diphenhydrAMINE  25 mg Oral Once    docusate sodium  100 mg Oral TID    EScitalopram oxalate  10 mg Oral QHS    heparin (porcine)  5,000 Units Subcutaneous Q8H    insulin aspart U-100  3 Units Subcutaneous TIDWM    levothyroxine  200 mcg Oral Before breakfast    methylPREDNISolone sodium succinate injection  5 mg/kg Intravenous Once    multivitamin  1 tablet Oral Daily    mupirocin  1 g Nasal BID    mycophenolate  1,000 mg Oral BID    NIFEdipine  30 mg Oral Daily    predniSONE  20 mg Oral Daily    tacrolimus  4 mg Oral BID    [START ON 6/2/2024] valGANciclovir  450 mg Oral Daily AM     Continuous Infusions:   glucagon (human recombinant)  1 mg Intramuscular Continuous PRN         PRN Meds:  Current Facility-Administered Medications:     0.9%  NaCl infusion (for blood administration), , Intravenous, Q24H PRN    0.9%  NaCl infusion (for blood administration), , Intravenous, Q24H PRN    cloNIDine, 0.1 mg, Oral, Q6H PRN    dextrose 10%, 12.5 g, Intravenous, PRN    dextrose 10%, 12.5 g, Intravenous, PRN    dextrose 10%, 25 g, Intravenous, PRN    dextrose 10%, 25 g, Intravenous, PRN    diphenhydrAMINE, 25 mg, Oral, Q6H PRN    EPINEPHrine (PF), 1 mg, Subcutaneous, Once PRN    EPINEPHrine (PF), 1 mg, Subcutaneous, Once PRN    glucagon (human recombinant), 1 mg, Intramuscular, Continuous PRN    glucagon (human recombinant), 1 mg, Intramuscular, PRN    glucose, 16 g, Oral, PRN    glucose, 16 g, Oral, PRN    glucose, 16 g, Oral, PRN    glucose, 24 g, Oral,  "PRN    glucose, 24 g, Oral, PRN    heparin, porcine (PF), 5 mL, Intravenous, PRN    hydrocortisone sodium succinate, 100 mg, Intravenous, Once PRN    hydrocortisone sodium succinate, 100 mg, Intravenous, Once PRN    insulin aspart U-100, 0-10 Units, Subcutaneous, QID (AC + HS) PRN    ondansetron, 4 mg, Intravenous, Q6H PRN    oxyCODONE, 5 mg, Oral, Q6H PRN    sodium chloride 0.9%, 10 mL, Intravenous, PRN    traZODone, 50 mg, Oral, Nightly PRN    Intake/Output - Last 3 Shifts         05/24 0700 05/25 0659 05/25 0700 05/26 0659 05/26 0700 05/27 0659    P.O. 840 1680 380    I.V. (mL/kg) 1827.8 (16.4)      Blood 453.3      IV Piggyback 537.6 335.5     Total Intake(mL/kg) 3658.7 (32.9) 2015.5 (18.1) 380 (3.4)    Urine (mL/kg/hr) 5930 (2.2) 3550 (1.3) 300 (0.7)    Drains 290 305     Stool  0     Total Output 6220 3855 300    Net -2561.4 -1839.5 +80           Stool Occurrence  2 x              Review of Systems   Constitutional:  Negative for activity change and appetite change.   Respiratory:  Negative for shortness of breath.    Gastrointestinal:  Positive for abdominal pain. Negative for abdominal distention, nausea and vomiting.   Genitourinary:  Negative for decreased urine volume and hematuria.   Skin:  Positive for wound.   Allergic/Immunologic: Positive for immunocompromised state.   Psychiatric/Behavioral:  Negative for confusion and decreased concentration.       Objective:     Vital Signs (Most Recent):  Temp: 98.6 °F (37 °C) (05/26/24 0740)  Pulse: 87 (05/26/24 0740)  Resp: 16 (05/26/24 0837)  BP: (!) 143/62 (05/26/24 0740)  SpO2: 97 % (05/26/24 0740) Vital Signs (24h Range):  Temp:  [98 °F (36.7 °C)-98.6 °F (37 °C)] 98.6 °F (37 °C)  Pulse:  [82-91] 87  Resp:  [15-18] 16  SpO2:  [93 %-99 %] 97 %  BP: (120-181)/(59-94) 143/62     Weight: 111.4 kg (245 lb 7.7 oz)  Height: 5' 9" (175.3 cm)  Body mass index is 36.25 kg/m².     Physical Exam  Vitals and nursing note reviewed.   Cardiovascular:      Rate and " Rhythm: Normal rate.   Pulmonary:      Effort: Pulmonary effort is normal.   Abdominal:      General: There is no distension.      Tenderness: There is abdominal tenderness. There is no guarding or rebound.      Comments: Kidney incision clean dry intact with staples in place  MARIXA drain x 1 with thin bloody drainage   Genitourinary:     Comments: Sheth removed  Neurological:      Mental Status: She is alert and oriented to person, place, and time.   Psychiatric:         Mood and Affect: Mood normal.         Behavior: Behavior normal.         Thought Content: Thought content normal.         Judgment: Judgment normal.          Laboratory:  CBC:   Recent Labs   Lab 05/24/24  1304 05/25/24  0554 05/26/24  0515   WBC 13.50* 11.13 7.25   RBC 3.75* 3.91* 3.70*   HGB 9.5* 10.0* 9.4*   HCT 31.7* 32.6* 30.0*   * 85* 67*   MCV 85 83 81*   MCH 25.3* 25.6* 25.4*   MCHC 30.0* 30.7* 31.3*     BMP:   Recent Labs   Lab 05/24/24  1304 05/25/24  0554 05/26/24  0515   GLU 85 192* 176*    142 142   K 4.6 4.8 4.6    112* 111*   CO2 23 21* 26   BUN 38* 29* 22   CREATININE 3.2* 2.0* 1.2   CALCIUM 7.4* 8.2* 8.9       Diagnostic Results:  None

## 2024-05-26 NOTE — PROGRESS NOTES
"[facility] - Transplant Stepdown  Endocrinology  Progress Note    Admit Date: 2024     Reason for Consult: Management of T2DM, Hyperglycemia     Surgical Procedure and Date: kidney transplant 24    Diabetes diagnosis year:      Home Diabetes Medications:  none     How often checking glucose at home? Not checking      Diabetes Complications include:     Hyperglycemia    Complicating diabetes co morbidities:   Glucocorticoid use       HPI:   Patient is a 64 y.o. female with hx of T2DM w/ retinopathy, HTN, remote hx of CVA, pulmonary HTN, asymptoamatic CAD, factor XII deficiency, obesity and ESRD on HD who presents to SICU s/p kidney transplant on 24. Endocrine consulted for post-operative management of BG.     Interval HPI:   No acute events overnight. Patient in room 03677/52849 A. Blood glucose variable. BG at and above goal on current insulin regimen (SSI ). Steroid use- Prednisone 20 mg QD.   3 Days Post-Op  Renal function-   Lab Results   Component Value Date    CREATININE 1.2 2024        Vasopressors-  None     Diet Low Potassium     Eatin%  Nausea: No  Hypoglycemia and intervention: No  Fever: No  TPN and/or TF: No    BP (!) 143/62 (Patient Position: Lying)   Pulse 87   Temp 98.6 °F (37 °C) (Oral)   Resp 16   Ht 5' 9" (1.753 m)   Wt 111.4 kg (245 lb 7.7 oz)   SpO2 97%   Breastfeeding No   BMI 36.25 kg/m²     Labs Reviewed and Include    Recent Labs   Lab 24  0515   *   CALCIUM 8.9   ALBUMIN 2.7*      K 4.6   CO2 26   *   BUN 22   CREATININE 1.2     Lab Results   Component Value Date    WBC 7.25 2024    HGB 9.4 (L) 2024    HCT 30.0 (L) 2024    MCV 81 (L) 2024    PLT 67 (L) 2024     No results for input(s): "TSH", "FREET4" in the last 168 hours.  Lab Results   Component Value Date    HGBA1C 5.5 2024       Nutritional status:   Body mass index is 36.25 kg/m².  Lab Results   Component Value Date    ALBUMIN 2.7 (L) " "05/26/2024    ALBUMIN 2.7 (L) 05/25/2024    ALBUMIN 2.5 (L) 05/24/2024     No results found for: "PREALBUMIN"    Estimated Creatinine Clearance: 64.7 mL/min (based on SCr of 1.2 mg/dL).    Accu-Checks  Recent Labs     05/24/24  0733 05/24/24  1054 05/24/24  1158 05/24/24  1456 05/24/24  1636 05/24/24  2056 05/25/24  1310 05/25/24  1810 05/25/24  2308 05/26/24  0831   POCTGLUCOSE 121* 209* 113* 147* 172* 235* 176* 308* 269* 129*       Current Medications and/or Treatments Impacting Glycemic Control  Immunotherapy:    Immunosuppressants           Stop Route Frequency     tacrolimus capsule 4 mg         -- Oral 2 times daily     mycophenolate capsule 1,000 mg         -- Oral 2 times daily     antithymocyte globulin (rabbit) 125 mg, hydrocortisone sodium succinate (SOLU-CORTEF) 20 mg in sodium chloride 0.9% 500 mL (FOR PERIPHERAL LINE ADMINISTRATION ONLY)         -- IV Once          Steroids:   Hormones (From admission, onward)      Start     Stop Route Frequency Ordered    05/26/24 0900  predniSONE tablet 20 mg  (IP TXP KIDNEY POST-OP THYMO WITH PERIPHERAL PRECHECKED)         -- Oral Daily 05/23/24 1416    05/23/24 1515  hydrocortisone sodium succinate injection 100 mg  (IP TXP KIDNEY POST-OP THYMO WITH PERIPHERAL PRECHECKED)         10/20/35 0615 IV Once as needed 05/23/24 1416    05/23/24 1045  antithymocyte globulin (rabbit) 125 mg, hydrocortisone sodium succinate (SOLU-CORTEF) 20 mg in sodium chloride 0.9% 500 mL (FOR PERIPHERAL LINE ADMINISTRATION ONLY)         -- IV Once 05/23/24 0938    05/23/24 1045  methylPREDNISolone sodium succinate (SOLU-MEDROL) 533 mg in dextrose 5 % (D5W) 100 mL IVPB  (methylPREDNISolone)         -- IV Once 05/23/24 0938    05/23/24 1037  hydrocortisone sodium succinate injection 100 mg         10/20/35 0137 IV Once as needed 05/23/24 0938          Pressors:    Autonomic Drugs (From admission, onward)      Start     Stop Route Frequency Ordered    05/23/24 1515  EPINEPHrine (PF) injection " 1 mg  (IP TXP KIDNEY POST-OP THYMO WITH PERIPHERAL PRECHECKED)         10/20/35 0615 SubQ Once as needed 05/23/24 1416    05/23/24 1037  EPINEPHrine (PF) injection 1 mg         10/20/35 0137 SubQ Once as needed 05/23/24 0938          Hyperglycemia/Diabetes Medications:   Antihyperglycemics (From admission, onward)      Start     Stop Route Frequency Ordered    05/26/24 0715  insulin aspart U-100 pen 3 Units         -- SubQ 3 times daily with meals 05/25/24 1925    05/25/24 0922  insulin aspart U-100 pen 0-10 Units         -- SubQ Before meals & nightly PRN 05/25/24 0923            ASSESSMENT and PLAN    Renal/  * S/P kidney transplant  Managed per primary team        Endocrine  Controlled type 2 diabetes mellitus with stable proliferative retinopathy of both eyes, unspecified whether long term insulin use  BG goal: 140-180    - Start novolog 3 units TIDWM given post prandial BG excursions noted   - LDC (150/50) prn   - POCT glucose ac/hs  - POCT Glucose before meals and at bedtime  - Hypoglycemia protocol in place      ** Please notify Endocrine for any change and/or advance in diet**  ** Please call Endocrine for any BG related issues **     Discharge Planning:   TBD. Please notify endocrinology prior to discharge.        Other  Adrenal cortical steroids causing adverse effect in therapeutic use    Glucocorticoids markedly increase glucose levels. Expect the steroid taper will help glucose control.           Trang Rivero PA-C  Endocrinology  [facility] - [facility] Step[facility]

## 2024-05-27 ENCOUNTER — PATIENT MESSAGE (OUTPATIENT)
Dept: ENDOCRINOLOGY | Facility: HOSPITAL | Age: 64
End: 2024-05-27
Payer: MEDICARE

## 2024-05-27 VITALS
OXYGEN SATURATION: 98 % | BODY MASS INDEX: 36.36 KG/M2 | TEMPERATURE: 98 F | DIASTOLIC BLOOD PRESSURE: 77 MMHG | SYSTOLIC BLOOD PRESSURE: 116 MMHG | HEIGHT: 69 IN | WEIGHT: 245.5 LBS | HEART RATE: 77 BPM | RESPIRATION RATE: 18 BRPM

## 2024-05-27 PROBLEM — E83.51 HYPOCALCEMIA: Status: RESOLVED | Noted: 2024-05-24 | Resolved: 2024-05-27

## 2024-05-27 LAB
ALBUMIN SERPL BCP-MCNC: 2.7 G/DL (ref 3.5–5.2)
ANION GAP SERPL CALC-SCNC: 6 MMOL/L (ref 8–16)
APTT PPP: 87.6 SEC (ref 21–32)
BASOPHILS # BLD AUTO: 0.02 K/UL (ref 0–0.2)
BASOPHILS NFR BLD: 0.3 % (ref 0–1.9)
BUN SERPL-MCNC: 22 MG/DL (ref 8–23)
CALCIUM SERPL-MCNC: 9 MG/DL (ref 8.7–10.5)
CHLORIDE SERPL-SCNC: 108 MMOL/L (ref 95–110)
CO2 SERPL-SCNC: 27 MMOL/L (ref 23–29)
CREAT SERPL-MCNC: 1.2 MG/DL (ref 0.5–1.4)
DIFFERENTIAL METHOD BLD: ABNORMAL
EOSINOPHIL # BLD AUTO: 0.2 K/UL (ref 0–0.5)
EOSINOPHIL NFR BLD: 2.3 % (ref 0–8)
ERYTHROCYTE [DISTWIDTH] IN BLOOD BY AUTOMATED COUNT: 17.3 % (ref 11.5–14.5)
EST. GFR  (NO RACE VARIABLE): 51.2 ML/MIN/1.73 M^2
GLUCOSE SERPL-MCNC: 108 MG/DL (ref 70–110)
HCT VFR BLD AUTO: 29.6 % (ref 37–48.5)
HGB BLD-MCNC: 9.4 G/DL (ref 12–16)
IMM GRANULOCYTES # BLD AUTO: 0.09 K/UL (ref 0–0.04)
IMM GRANULOCYTES NFR BLD AUTO: 1.4 % (ref 0–0.5)
INR PPP: 1.1 (ref 0.8–1.2)
LYMPHOCYTES # BLD AUTO: 0.5 K/UL (ref 1–4.8)
LYMPHOCYTES NFR BLD: 7.4 % (ref 18–48)
MAGNESIUM SERPL-MCNC: 2 MG/DL (ref 1.6–2.6)
MCH RBC QN AUTO: 25.6 PG (ref 27–31)
MCHC RBC AUTO-ENTMCNC: 31.8 G/DL (ref 32–36)
MCV RBC AUTO: 81 FL (ref 82–98)
MONOCYTES # BLD AUTO: 0.5 K/UL (ref 0.3–1)
MONOCYTES NFR BLD: 6.9 % (ref 4–15)
NEUTROPHILS # BLD AUTO: 5.3 K/UL (ref 1.8–7.7)
NEUTROPHILS NFR BLD: 81.7 % (ref 38–73)
NRBC BLD-RTO: 0 /100 WBC
PHOSPHATE SERPL-MCNC: 2.3 MG/DL (ref 2.7–4.5)
PLATELET # BLD AUTO: 55 K/UL (ref 150–450)
PMV BLD AUTO: ABNORMAL FL (ref 9.2–12.9)
POCT GLUCOSE: 101 MG/DL (ref 70–110)
POCT GLUCOSE: 197 MG/DL (ref 70–110)
POTASSIUM SERPL-SCNC: 4.2 MMOL/L (ref 3.5–5.1)
PROTHROMBIN TIME: 11.5 SEC (ref 9–12.5)
RBC # BLD AUTO: 3.67 M/UL (ref 4–5.4)
SODIUM SERPL-SCNC: 141 MMOL/L (ref 136–145)
TACROLIMUS BLD-MCNC: 4 NG/ML (ref 5–15)
WBC # BLD AUTO: 6.52 K/UL (ref 3.9–12.7)

## 2024-05-27 PROCEDURE — 25000003 PHARM REV CODE 250: Mod: HCNC | Performed by: CLINICAL NURSE SPECIALIST

## 2024-05-27 PROCEDURE — 85730 THROMBOPLASTIN TIME PARTIAL: CPT | Mod: HCNC | Performed by: SURGERY

## 2024-05-27 PROCEDURE — 25000003 PHARM REV CODE 250: Mod: HCNC | Performed by: INTERNAL MEDICINE

## 2024-05-27 PROCEDURE — 83735 ASSAY OF MAGNESIUM: CPT | Mod: HCNC | Performed by: STUDENT IN AN ORGANIZED HEALTH CARE EDUCATION/TRAINING PROGRAM

## 2024-05-27 PROCEDURE — 25000003 PHARM REV CODE 250: Mod: HCNC | Performed by: TRANSPLANT SURGERY

## 2024-05-27 PROCEDURE — 99024 POSTOP FOLLOW-UP VISIT: CPT | Mod: HCNC,,, | Performed by: CLINICAL NURSE SPECIALIST

## 2024-05-27 PROCEDURE — 85610 PROTHROMBIN TIME: CPT | Mod: HCNC | Performed by: SURGERY

## 2024-05-27 PROCEDURE — 63600175 PHARM REV CODE 636 W HCPCS: Mod: HCNC | Performed by: STUDENT IN AN ORGANIZED HEALTH CARE EDUCATION/TRAINING PROGRAM

## 2024-05-27 PROCEDURE — 99232 SBSQ HOSP IP/OBS MODERATE 35: CPT | Mod: HCNC,,,

## 2024-05-27 PROCEDURE — 80197 ASSAY OF TACROLIMUS: CPT | Mod: HCNC | Performed by: STUDENT IN AN ORGANIZED HEALTH CARE EDUCATION/TRAINING PROGRAM

## 2024-05-27 PROCEDURE — 63600175 PHARM REV CODE 636 W HCPCS: Mod: HCNC | Performed by: INTERNAL MEDICINE

## 2024-05-27 PROCEDURE — 80069 RENAL FUNCTION PANEL: CPT | Mod: HCNC | Performed by: STUDENT IN AN ORGANIZED HEALTH CARE EDUCATION/TRAINING PROGRAM

## 2024-05-27 PROCEDURE — 25000003 PHARM REV CODE 250: Mod: HCNC | Performed by: PHYSICIAN ASSISTANT

## 2024-05-27 PROCEDURE — 25000003 PHARM REV CODE 250: Mod: HCNC | Performed by: STUDENT IN AN ORGANIZED HEALTH CARE EDUCATION/TRAINING PROGRAM

## 2024-05-27 PROCEDURE — 63600175 PHARM REV CODE 636 W HCPCS: Mod: HCNC | Performed by: PHYSICIAN ASSISTANT

## 2024-05-27 PROCEDURE — 85025 COMPLETE CBC W/AUTO DIFF WBC: CPT | Mod: HCNC | Performed by: STUDENT IN AN ORGANIZED HEALTH CARE EDUCATION/TRAINING PROGRAM

## 2024-05-27 PROCEDURE — 63600175 PHARM REV CODE 636 W HCPCS: Mod: HCNC | Performed by: NURSE PRACTITIONER

## 2024-05-27 RX ORDER — TACROLIMUS 1 MG/1
2 CAPSULE ORAL ONCE
Status: COMPLETED | OUTPATIENT
Start: 2024-05-27 | End: 2024-05-27

## 2024-05-27 RX ORDER — INSULIN ASPART 100 [IU]/ML
3 INJECTION, SOLUTION INTRAVENOUS; SUBCUTANEOUS 3 TIMES DAILY
Qty: 6 ML | Refills: 0 | Status: SHIPPED | OUTPATIENT
Start: 2024-05-27

## 2024-05-27 RX ORDER — METHOCARBAMOL 500 MG/1
500 TABLET, FILM COATED ORAL 4 TIMES DAILY PRN
Qty: 40 TABLET | Refills: 0 | Status: SHIPPED | OUTPATIENT
Start: 2024-05-27 | End: 2024-06-06

## 2024-05-27 RX ORDER — INSULIN ASPART 100 [IU]/ML
3 INJECTION, SOLUTION INTRAVENOUS; SUBCUTANEOUS
Status: DISCONTINUED | OUTPATIENT
Start: 2024-05-27 | End: 2024-05-27

## 2024-05-27 RX ORDER — LANCETS
1 EACH MISCELLANEOUS 3 TIMES DAILY
Qty: 100 EACH | Refills: 2 | Status: SHIPPED | OUTPATIENT
Start: 2024-05-27

## 2024-05-27 RX ORDER — INSULIN ASPART 100 [IU]/ML
4 INJECTION, SOLUTION INTRAVENOUS; SUBCUTANEOUS
Status: DISCONTINUED | OUTPATIENT
Start: 2024-05-27 | End: 2024-05-27 | Stop reason: HOSPADM

## 2024-05-27 RX ORDER — NIFEDIPINE 30 MG/1
30 TABLET, EXTENDED RELEASE ORAL DAILY
Qty: 30 TABLET | Refills: 5 | Status: SHIPPED | OUTPATIENT
Start: 2024-05-28

## 2024-05-27 RX ORDER — INSULIN ASPART 100 [IU]/ML
4 INJECTION, SOLUTION INTRAVENOUS; SUBCUTANEOUS
Status: DISCONTINUED | OUTPATIENT
Start: 2024-05-27 | End: 2024-05-27

## 2024-05-27 RX ORDER — OXYCODONE HYDROCHLORIDE 5 MG/1
5 TABLET ORAL EVERY 6 HOURS PRN
Qty: 28 TABLET | Refills: 0 | Status: SHIPPED | OUTPATIENT
Start: 2024-05-27

## 2024-05-27 RX ORDER — LIDOCAINE HYDROCHLORIDE 10 MG/ML
1 INJECTION INFILTRATION; PERINEURAL ONCE
Status: COMPLETED | OUTPATIENT
Start: 2024-05-27 | End: 2024-05-27

## 2024-05-27 RX ORDER — CARVEDILOL 6.25 MG/1
6.25 TABLET ORAL 2 TIMES DAILY
Qty: 180 TABLET | Refills: 3 | Status: SHIPPED | OUTPATIENT
Start: 2024-05-27 | End: 2025-05-27

## 2024-05-27 RX ORDER — TACROLIMUS 1 MG/1
6 CAPSULE ORAL 2 TIMES DAILY
Status: DISCONTINUED | OUTPATIENT
Start: 2024-05-27 | End: 2024-05-27 | Stop reason: HOSPADM

## 2024-05-27 RX ORDER — DEXTROSE 4 G
TABLET,CHEWABLE ORAL
Qty: 1 EACH | Refills: 0 | Status: SHIPPED | OUTPATIENT
Start: 2024-05-27

## 2024-05-27 RX ORDER — PEN NEEDLE, DIABETIC 30 GX3/16"
1 NEEDLE, DISPOSABLE MISCELLANEOUS 3 TIMES DAILY
Qty: 100 EACH | Refills: 0 | Status: SHIPPED | OUTPATIENT
Start: 2024-05-27

## 2024-05-27 RX ADMIN — TACROLIMUS 4 MG: 1 CAPSULE ORAL at 08:05

## 2024-05-27 RX ADMIN — HEPARIN SODIUM 5000 UNITS: 5000 INJECTION INTRAVENOUS; SUBCUTANEOUS at 05:05

## 2024-05-27 RX ADMIN — LEVOTHYROXINE SODIUM 200 MCG: 100 TABLET ORAL at 05:05

## 2024-05-27 RX ADMIN — CARVEDILOL 6.25 MG: 6.25 TABLET, FILM COATED ORAL at 08:05

## 2024-05-27 RX ADMIN — THERA TABS 1 TABLET: TAB at 08:05

## 2024-05-27 RX ADMIN — TACROLIMUS 2 MG: 1 CAPSULE ORAL at 10:05

## 2024-05-27 RX ADMIN — PREDNISONE 20 MG: 20 TABLET ORAL at 08:05

## 2024-05-27 RX ADMIN — LIDOCAINE HYDROCHLORIDE 1 ML: 10 INJECTION, SOLUTION INFILTRATION; PERINEURAL at 12:05

## 2024-05-27 RX ADMIN — INSULIN ASPART 2 UNITS: 100 INJECTION, SOLUTION INTRAVENOUS; SUBCUTANEOUS at 01:05

## 2024-05-27 RX ADMIN — CALCIUM CARBONATE (ANTACID) CHEW TAB 500 MG 500 MG: 500 CHEW TAB at 08:05

## 2024-05-27 RX ADMIN — ATOVAQUONE 1500 MG: 750 SUSPENSION ORAL at 08:05

## 2024-05-27 RX ADMIN — MUPIROCIN 1 G: 20 OINTMENT TOPICAL at 08:05

## 2024-05-27 RX ADMIN — METHOCARBAMOL 500 MG: 500 TABLET ORAL at 12:05

## 2024-05-27 RX ADMIN — HEPARIN 500 UNITS: 100 SYRINGE at 05:05

## 2024-05-27 RX ADMIN — METHOCARBAMOL 500 MG: 500 TABLET ORAL at 08:05

## 2024-05-27 RX ADMIN — MYCOPHENOLATE MOFETIL 1000 MG: 250 CAPSULE ORAL at 08:05

## 2024-05-27 RX ADMIN — NIFEDIPINE 30 MG: 30 TABLET, FILM COATED, EXTENDED RELEASE ORAL at 08:05

## 2024-05-27 RX ADMIN — INSULIN ASPART 3 UNITS: 100 INJECTION, SOLUTION INTRAVENOUS; SUBCUTANEOUS at 08:05

## 2024-05-27 RX ADMIN — OXYCODONE 5 MG: 5 TABLET ORAL at 09:05

## 2024-05-27 RX ADMIN — INSULIN ASPART 4 UNITS: 100 INJECTION, SOLUTION INTRAVENOUS; SUBCUTANEOUS at 01:05

## 2024-05-27 RX ADMIN — DOCUSATE SODIUM 100 MG: 100 CAPSULE, LIQUID FILLED ORAL at 08:05

## 2024-05-27 NOTE — PROGRESS NOTES
Transplant Social Work Discharge Note:    Pt will discharge to patient's home under the care of her children. Patient reports her son will transport her home. Per rounds this morning patient does not require referrals from this  at this time. Patient denied needing or wanting resources or referrals at this time. Patient agrees to contact transplant team with needs, questions, or concerns as they arise.  Pt aware of, involved in, and coping well with this discharge plan. Pt did not have any concerns with the discharge plan at this time. JERMA[facility] remains available at [phone].    Parisa Calloway LMSW

## 2024-05-27 NOTE — PLAN OF CARE
AAOx4. VSS. Afebrile. On RA. Self meds pulled 100%. Kidney incision OA with staples intact. MARIXA draining SS liquid; output measured. Plans to possibly d/c drain in AM. Pt urinating clear yellow urine into hat. I&Os measured. Accu ACHS. Insulin coverage given. Personal items within reach. Bed in low position. Bed wheels locked. Side rails raisedx2. Call light in reach.

## 2024-05-27 NOTE — SUBJECTIVE & OBJECTIVE
"Interval HPI:   No acute events overnight. Patient in room 68307/41254 A. Blood glucose variable. BG at and above goal on current insulin regimen (SSI ). Steroid use- Prednisone 20 mg QD.     Renal function-   Lab Results   Component Value Date    CREATININE 1.2 2024          Vasopressors-  None      Diet Low Potassium      Eatin%  Nausea: No  Hypoglycemia and intervention: No  Fever: No  TPN and/or TF: No       BP (!) 145/82 (BP Location: Right arm, Patient Position: Lying)   Pulse 85   Temp 99.5 °F (37.5 °C) (Oral)   Resp 16   Ht 5' 9" (1.753 m)   Wt 111.4 kg (245 lb 7.7 oz)   SpO2 97%   Breastfeeding No   BMI 36.25 kg/m²     Labs Reviewed and Include    Recent Labs   Lab 24  0510      CALCIUM 9.0   ALBUMIN 2.7*      K 4.2   CO2 27      BUN 22   CREATININE 1.2     Lab Results   Component Value Date    WBC 6.52 2024    HGB 9.4 (L) 2024    HCT 29.6 (L) 2024    MCV 81 (L) 2024    PLT 55 (L) 2024     No results for input(s): "TSH", "FREET4" in the last 168 hours.  Lab Results   Component Value Date    HGBA1C 5.5 2024       Nutritional status:   Body mass index is 36.25 kg/m².  Lab Results   Component Value Date    ALBUMIN 2.7 (L) 2024    ALBUMIN 2.7 (L) 2024    ALBUMIN 2.7 (L) 2024     No results found for: "PREALBUMIN"    Estimated Creatinine Clearance: 64.7 mL/min (based on SCr of 1.2 mg/dL).    Accu-Checks  Recent Labs     24  1636 24  2056 24  1310 24  1810 24  2308 24  0831 24  1309 24  1731 24  2211 24  0833   POCTGLUCOSE 172* 235* 176* 308* 269* 129* 169* 285* 228* 101       Current Medications and/or Treatments Impacting Glycemic Control  Immunotherapy:    Immunosuppressants           Stop Route Frequency     tacrolimus capsule 4 mg         -- Oral 2 times daily     mycophenolate capsule 1,000 mg         -- Oral 2 times daily          Steroids: "   Hormones (From admission, onward)      Start     Stop Route Frequency Ordered    05/26/24 0900  predniSONE tablet 20 mg  (IP TXP KIDNEY POST-OP THYMO WITH PERIPHERAL PRECHECKED)         -- Oral Daily 05/23/24 1416          Pressors:    Autonomic Drugs (From admission, onward)      None          Hyperglycemia/Diabetes Medications:   Antihyperglycemics (From admission, onward)      Start     Stop Route Frequency Ordered    05/27/24 1130  insulin aspart U-100 pen 4 Units         -- SubQ 3 times daily with meals 05/27/24 0952    05/25/24 0922  insulin aspart U-100 pen 0-10 Units         -- SubQ Before meals & nightly PRN 05/25/24 0923

## 2024-05-27 NOTE — PROGRESS NOTES
Patient discharged per order. PIV removed per order. AVS reviewed with patient. Patient verbalizes understanding of all discharge instructions. No acute distress noted. Transport requested.    none

## 2024-05-27 NOTE — PROGRESS NOTES
DISCHARGE NOTE:    Kathie Quezada is a 64 y.o. female s/p LEFT KIDNEY     Living     transplant on 5/23/2024 (Kidney) for ESRD secondary to Diabetes Mellitus - Type II.      Past Medical History:   Diagnosis Date    Acute respiratory failure with hypoxia and hypercapnia 05/26/2019    ATN (acute tubular necrosis) 02/08/2019    Chronic diastolic heart failure 02/07/2019    2-8-19 Mild left atrial enlargement. Mild left ventricular enlargement. Normal left ventricular systolic function. The estimated ejection fraction is 55% Indeterminate left ventricular diastolic function. Normal right ventricular systolic function. Mild mitral regurgitation. Mild to moderate tricuspid regurgitation. The estimated PA systolic pressure is 32 mm Hg Normal central venous pressure (3 m    CKD (chronic kidney disease) stage 4, GFR 15-29 ml/min 05/16/2018    CKD stage G3a/A3, GFR 45-59 and albumin creatinine ratio >300 mg/g 05/16/2018    CKD stage G3b/A3, GFR 30-44 and albumin creatinine ratio >300 mg/g 05/16/2018    Closed compression fracture of L2 lumbar vertebra 05/24/2019    Closed compression fracture of L2 lumbar vertebra 05/24/2019     IMO Regulatory Load October 2019    Clotted dialysis access 07/22/2023    Controlled type 2 diabetes with retinopathy 04/09/2018    COVID-19 virus infection 7-1-2021 07/01/2021    CVA (cerebral vascular accident) 2017    Diabetic polyneuropathy associated with type 2 diabetes mellitus 12/04/2020    Encounter for blood transfusion     Essential hypertension 04/09/2018    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  7-31-19 Factor XI Activity 55 - 145 % 96       Gastroesophageal reflux disease without esophagitis 05/24/2019    Generalized anxiety disorder 11/08/2019    GERD (gastroesophageal reflux disease)     GIB (gastrointestinal bleeding) 10/18/2018    History of CVA (cerebrovascular accident) 11/27/2022    History of CVA (cerebrovascular accident) without residual  deficits 11/27/2022    History of hepatitis C     s/p succesful Rx w/ [facility]  - SVR12 (cure) 2016    Hypertensive retinopathy, bilateral 12/10/2019    Mild nonproliferative diabetic retinopathy 05/02/2018    Mixed hyperlipidemia 04/09/2018    Myalgia due to statin 09/07/2022    Normocytic anemia 01/14/2022    NS (nuclear sclerosis), bilateral 12/10/2019    Peanut allergy 02/04/2023    PNA (pneumonia) 05/28/2019    Postoperative hypothyroidism     Pulmonary hypertension 02/06/2023    Renovascular hypertension 04/09/2018    S/P kyphoplasty 08/14/2019 8/14/19: L2 kyphoplasty with Dr. Kwon     Slow transit constipation 05/27/2019    Type 2 diabetes mellitus with circulatory disorder, with long-term current use of insulin 04/09/2018    Type 2 diabetes mellitus with stage 3 chronic kidney disease, without long-term current use of insulin 04/09/2018    Type 2 diabetes mellitus with stage 3a chronic kidney disease, without long-term current use of insulin 03/03/2021    Type 2 diabetes mellitus with stage 3b chronic kidney disease, without long-term current use of insulin 09/07/2022       Hospital Course: S/P living related kidney transplant on 5/23/24 - surgery without complication.  SCr 1.2 at time of discharge.  Due to factor XII deficiency - received 1 units of FFP in OR and POD#1.  Received standard DVT prophylaxis while inpatient and was transitioned to lovenox prophylaxis for 2 weeks.  Otherwise, progressing well.     Allergies:   Review of patient's allergies indicates:   Allergen Reactions    Atorvastatin Other (See Comments)     Statin induced myalgia    Mushroom Hives    Peanut Anaphylaxis    Bactrim [sulfamethoxazole-trimethoprim] Hives    Gabapentin      Pruritis     Iodine     Peanut butter flavor     Penicillins Hives    Shellfish containing products Itching    Sulfa (sulfonamide antibiotics) Hives       Patient [facility]acy: [facility]    Discharge Medications:     Medication List        START taking these  "medications      atovaquone 750 mg/5 mL Susp oral liquid  Commonly known as: MEPRON  Take 10 mLs (1,500 mg total) by mouth once daily. Stop 11/19/24     blood sugar diagnostic Strp  1 each by Misc.(Non-Drug; Combo Route) route 3 (three) times daily.     blood-glucose meter Misc  Use as instructed     calcium carbonate 200 mg calcium (500 mg) chewable tablet  Commonly known as: TUMS  CHEW 2 tablets (1,000 mg total) by mouth 2 (two) times daily.     carvediloL 6.25 MG tablet  Commonly known as: COREG  Take 1 tablet (6.25 mg total) by mouth 2 (two) times daily.     enoxaparin 40 mg/0.4 mL Syrg  Commonly known as: LOVENOX  Inject 0.4 mLs (40 mg total) into the skin once daily. Stop 6/7/24 for 14 days     famotidine 20 MG tablet  Commonly known as: PEPCID  Take 1 tablet (20 mg total) by mouth every evening.     insulin aspart U-100 100 unit/mL (3 mL) Inpn pen  Commonly known as: NovoLOG  Inject 3 Units into the skin 3 (three) times daily. + SSI (TDD: 24)     lancets Misc  1 each by Misc.(Non-Drug; Combo Route) route 3 (three) times daily.     methocarbamoL 500 MG Tab  Commonly known as: ROBAXIN  Take 1 tablet (500 mg total) by mouth 4 (four) times daily as needed (muscle spasms).     multivitamin Tab     mycophenolate 250 mg Cap  Commonly known as: CELLCEPT  Take 4 capsules (1,000 mg total) by mouth 2 (two) times daily.     NIFEdipine 30 MG (OSM) 24 hr tablet  Commonly known as: PROCARDIA-XL  Take 1 tablet (30 mg total) by mouth once daily.  Start taking on: May 28, 2024  Replaces: NIFEdipine 60 MG Tbsr     oxyCODONE 5 MG immediate release tablet  Commonly known as: ROXICODONE  Take 1 tablet (5 mg total) by mouth every 6 (six) hours as needed for Pain.     pen needle, diabetic 32 gauge x 5/32" Ndle  1 each by Misc.(Non-Drug; Combo Route) route 3 (three) times daily.     predniSONE 5 MG tablet  Commonly known as: DELTASONE  Take by mouth daily; 5/26/2024-6/1/2024: 20 mg, 6/2/2024-6/8/2024: 15 mg; 6/9/2024-6/15/2024: 10 " mg; 6/16/2024- forever: 5 mg; do not stop     tacrolimus 1 MG Cap  Commonly known as: PROGRAF  Take 6 capsules (6 mg total) by mouth every 12 (twelve) hours.     valGANciclovir 450 mg Tab  Commonly known as: VALCYTE  Take 2 tablets (900 mg total) by mouth every morning. Stop 8/21/24            CONTINUE taking these medications      cholecalciferol (vitamin D3) 125 mcg (5,000 unit) Tab  Take 1 tablet (5,000 Units total) by mouth once daily.     EScitalopram oxalate 10 MG tablet  Commonly known as: LEXAPRO  Take 1 tablet (10 mg total) by mouth every evening.     ezetimibe 10 mg tablet  Commonly known as: ZETIA  Take 1 tablet (10 mg total) by mouth once daily.     levocetirizine 5 MG tablet  Commonly known as: XYZAL  Take 1 tablet (5 mg total) by mouth daily as needed for Allergies.     SYNTHROID 200 mcg tablet  Generic drug: levothyroxine  Take 1 tablet (200 mcg total) by mouth before breakfast.     traZODone 50 MG tablet  Commonly known as: DESYREL  Take 1 to 2 tablets ( mg total) by mouth nightly as needed for Insomnia.            STOP taking these medications      calcium acetate(phosphat bind) 667 mg capsule  Commonly known as: PHOSLO     fentaNYL 50 mcg/mL injection  Commonly known as: SUBLIMAZE     ferrous gluconate 324 MG tablet  Commonly known as: FERGON     heparin (porcine) 1,000 unit/mL injection     hydrALAZINE 50 MG tablet  Commonly known as: APRESOLINE     irbesartan 300 MG tablet  Commonly known as: AVAPRO     midazolam 1 mg/mL injection  Commonly known as: VERSED     NIFEdipine 60 MG Tbsr  Commonly known as: ADALAT CC  Replaced by: NIFEdipine 30 MG (OSM) 24 hr tablet     OMNIPAQUE 350 350 mg iodine/mL Soln injection  Generic drug: iohexoL               Where to Get Your Medications        These medications were sent to [facility]  [address] [facility] [address]      Hours: Always Open Phone: [phone]   atovaquone 750 mg/5 mL Susp oral liquid  blood sugar diagnostic  "Strp  blood-glucose meter Misc  calcium carbonate 200 mg calcium (500 mg) chewable tablet  carvediloL 6.25 MG tablet  enoxaparin 40 mg/0.4 mL Syrg  famotidine 20 MG tablet  insulin aspart U-100 100 unit/mL (3 mL) Inpn pen  lancets Misc  methocarbamoL 500 MG Tab  mycophenolate 250 mg Cap  NIFEdipine 30 MG (OSM) 24 hr tablet  oxyCODONE 5 MG immediate release tablet  pen needle, diabetic 32 gauge x 5/32" Ndle  predniSONE 5 MG tablet  tacrolimus 1 MG Cap  valGANciclovir 450 mg Tab          Pharmacy Interventions/Recommendations:  1) Transplant Immunosuppression: Induction Thymoglobulin, and maintenance Tacrolimus 6 mg BID, Cellcept 1000 mg BID and Prednisone taper (cPRA 67%).      2) Opportunistic Infection prophylaxis: Atovaquone until 11/19/24; Valcyte until 8/21/24    3) Osteoporosis Prevention measures (liver txp): N/A    4) Patient Counseling/Education: Demonstrated the use of the BP cuff, thermometer.    5) Follow-Up/Discharge Needs:  Patient will need endocrine follow up -- started on insulin for discharge. Patient discharged with lovenox 40 mg daily (EOT: 6/7/24) due to hx of factor XII deficiency.      6) Patient Assistance Information: N/A    7) The following medications have been placed on HOLD and should be restarted in the outpatient setting (when appropriate): N/A    Kathie and her caregiver verbalized their understanding and had the opportunity to ask questions.  "

## 2024-05-27 NOTE — PROGRESS NOTES
Patient taught how to properly use insulin pen. Patient demonstrated proper technique using teachback.

## 2024-05-27 NOTE — PROGRESS NOTES
"[facility] - Transplant Stepdown  Endocrinology  Progress Note    Admit Date: 2024     Reason for Consult: Management of T2DM, Hyperglycemia     Surgical Procedure and Date: kidney transplant 24    Diabetes diagnosis year:      Home Diabetes Medications:  none     How often checking glucose at home? Not checking      Diabetes Complications include:     Hyperglycemia    Complicating diabetes co morbidities:   Glucocorticoid use       HPI:   Patient is a 64 y.o. female with hx of T2DM w/ retinopathy, HTN, remote hx of CVA, pulmonary HTN, asymptoamatic CAD, factor XII deficiency, obesity and ESRD on HD who presents to SICU s/p kidney transplant on 24. Endocrine consulted for post-operative management of BG.     Interval HPI:   No acute events overnight. Patient in room 25985/44156 A. Blood glucose variable. BG at and above goal on current insulin regimen (SSI ). Steroid use- Prednisone 20 mg QD.     Renal function-   Lab Results   Component Value Date    CREATININE 1.2 2024          Vasopressors-  None      Diet Low Potassium      Eatin%  Nausea: No  Hypoglycemia and intervention: No  Fever: No  TPN and/or TF: No       BP (!) 145/82 (BP Location: Right arm, Patient Position: Lying)   Pulse 85   Temp 99.5 °F (37.5 °C) (Oral)   Resp 16   Ht 5' 9" (1.753 m)   Wt 111.4 kg (245 lb 7.7 oz)   SpO2 97%   Breastfeeding No   BMI 36.25 kg/m²     Labs Reviewed and Include    Recent Labs   Lab 24  0510      CALCIUM 9.0   ALBUMIN 2.7*      K 4.2   CO2 27      BUN 22   CREATININE 1.2     Lab Results   Component Value Date    WBC 6.52 2024    HGB 9.4 (L) 2024    HCT 29.6 (L) 2024    MCV 81 (L) 2024    PLT 55 (L) 2024     No results for input(s): "TSH", "FREET4" in the last 168 hours.  Lab Results   Component Value Date    HGBA1C 5.5 2024       Nutritional status:   Body mass index is 36.25 kg/m².  Lab Results   Component Value Date    " "ALBUMIN 2.7 (L) 05/27/2024    ALBUMIN 2.7 (L) 05/26/2024    ALBUMIN 2.7 (L) 05/25/2024     No results found for: "PREALBUMIN"    Estimated Creatinine Clearance: 64.7 mL/min (based on SCr of 1.2 mg/dL).    Accu-Checks  Recent Labs     05/24/24  1636 05/24/24  2056 05/25/24  1310 05/25/24  1810 05/25/24  2308 05/26/24  0831 05/26/24  1309 05/26/24  1731 05/26/24  2211 05/27/24  0833   POCTGLUCOSE 172* 235* 176* 308* 269* 129* 169* 285* 228* 101       Current Medications and/or Treatments Impacting Glycemic Control  Immunotherapy:    Immunosuppressants           Stop Route Frequency     tacrolimus capsule 4 mg         -- Oral 2 times daily     mycophenolate capsule 1,000 mg         -- Oral 2 times daily          Steroids:   Hormones (From admission, onward)      Start     Stop Route Frequency Ordered    05/26/24 0900  predniSONE tablet 20 mg  (IP TXP KIDNEY POST-OP THYMO WITH PERIPHERAL PRECHECKED)         -- Oral Daily 05/23/24 1416          Pressors:    Autonomic Drugs (From admission, onward)      None          Hyperglycemia/Diabetes Medications:   Antihyperglycemics (From admission, onward)      Start     Stop Route Frequency Ordered    05/27/24 1130  insulin aspart U-100 pen 4 Units         -- SubQ 3 times daily with meals 05/27/24 0952    05/25/24 0922  insulin aspart U-100 pen 0-10 Units         -- SubQ Before meals & nightly PRN 05/25/24 0923            ASSESSMENT and PLAN    Renal/  * S/P kidney transplant  Managed per primary team        Endocrine  Controlled type 2 diabetes mellitus with stable proliferative retinopathy of both eyes, unspecified whether long term insulin use  BG goal: 140-180    - Novolog 4 units TIDWM ((20% increase due to prandial blood glucose  above goal)  - LDC (150/50) prn   - POCT glucose ac/hs  - POCT Glucose before meals and at bedtime  - Hypoglycemia protocol in place      ** Please notify Endocrine for any change and/or advance in diet**  ** Please call Endocrine for any BG " related issues **     Discharge Planning:   Discharge Planning:   - Novolog 3 units TIDWM (Hold if BG < 100 mg/dL or not eating)  - Novolog SSI for BG excursions:  Add correction scale as needed.  Blood sugar 150 to 200 add 1 units  Blood sugar 201 to 250 add 2 units  Blood sugar 251 to 300 add 3 units  Blood sugar 301 to 350 add 4 units  Blood sugar greater than 350 add 5 units  - Insurance approved diabetes testing supplies to check blood sugar 4 times a day (Before meals and at bedtime) and as needed.  - BD pen needles   - Send BG logs in 4 days  - Follow-up in discharge clinic in 2 weeks.     Education:  Discharge Teaching:    Reviewed topics related to DM including: the need for insulin, how insulin works, what makes it a high risk medication, the importance of immediate follow up with either PCP or endocrine provider, importance of and how to check BG, how to record BG on logs, how to administer insulin, appropriate insulin administration sites, importance of rotating injection sites, hyper/hypoglycemia, how and when to treat hypoglycemia, when to hold insulin, how the correction scale works, importance of storing unused insulin in the refrigerator, and when to seek medical attention.  Patient verbalized understanding, answered all questions to patient's satisfaction. Blood sugar logs given to patient.           Other  Adrenal cortical steroids causing adverse effect in therapeutic use    Glucocorticoids markedly increase glucose levels. Expect the steroid taper will help glucose control.           Trang Rivero PA-C  Endocrinology  [facility] - Transplant Stepdown

## 2024-05-28 ENCOUNTER — CLINICAL SUPPORT (OUTPATIENT)
Dept: TRANSPLANT | Facility: CLINIC | Age: 64
End: 2024-05-28
Payer: MEDICARE

## 2024-05-28 ENCOUNTER — TELEPHONE (OUTPATIENT)
Dept: INTERVENTIONAL RADIOLOGY/VASCULAR | Facility: CLINIC | Age: 64
End: 2024-05-28
Payer: MEDICARE

## 2024-05-28 ENCOUNTER — LAB VISIT (OUTPATIENT)
Dept: LAB | Facility: HOSPITAL | Age: 64
End: 2024-05-28
Attending: INTERNAL MEDICINE
Payer: MEDICARE

## 2024-05-28 VITALS
RESPIRATION RATE: 18 BRPM | TEMPERATURE: 97 F | HEIGHT: 69 IN | HEART RATE: 88 BPM | HEIGHT: 69 IN | HEART RATE: 88 BPM | WEIGHT: 235.69 LBS | DIASTOLIC BLOOD PRESSURE: 58 MMHG | TEMPERATURE: 97 F | SYSTOLIC BLOOD PRESSURE: 134 MMHG | RESPIRATION RATE: 18 BRPM | OXYGEN SATURATION: 98 % | DIASTOLIC BLOOD PRESSURE: 58 MMHG | SYSTOLIC BLOOD PRESSURE: 134 MMHG | BODY MASS INDEX: 34.91 KG/M2 | WEIGHT: 235.69 LBS | OXYGEN SATURATION: 98 % | BODY MASS INDEX: 34.91 KG/M2

## 2024-05-28 DIAGNOSIS — Z94.0 KIDNEY REPLACED BY TRANSPLANT: ICD-10-CM

## 2024-05-28 DIAGNOSIS — Z94.0 S/P KIDNEY TRANSPLANT: Primary | ICD-10-CM

## 2024-05-28 DIAGNOSIS — Z94.0 KIDNEY REPLACED BY TRANSPLANT: Primary | ICD-10-CM

## 2024-05-28 LAB
ALBUMIN SERPL BCP-MCNC: 3.1 G/DL (ref 3.5–5.2)
ANION GAP SERPL CALC-SCNC: 11 MMOL/L (ref 8–16)
BASOPHILS # BLD AUTO: 0.03 K/UL (ref 0–0.2)
BASOPHILS NFR BLD: 0.4 % (ref 0–1.9)
BUN SERPL-MCNC: 24 MG/DL (ref 8–23)
CALCIUM SERPL-MCNC: 9.4 MG/DL (ref 8.7–10.5)
CHLORIDE SERPL-SCNC: 107 MMOL/L (ref 95–110)
CO2 SERPL-SCNC: 26 MMOL/L (ref 23–29)
CREAT SERPL-MCNC: 1.4 MG/DL (ref 0.5–1.4)
DIFFERENTIAL METHOD BLD: ABNORMAL
EOSINOPHIL # BLD AUTO: 0.3 K/UL (ref 0–0.5)
EOSINOPHIL NFR BLD: 3.9 % (ref 0–8)
ERYTHROCYTE [DISTWIDTH] IN BLOOD BY AUTOMATED COUNT: 17.4 % (ref 11.5–14.5)
EST. GFR  (NO RACE VARIABLE): 42.5 ML/MIN/1.73 M^2
GLUCOSE SERPL-MCNC: 130 MG/DL (ref 70–110)
HCT VFR BLD AUTO: 33.7 % (ref 37–48.5)
HGB BLD-MCNC: 10.3 G/DL (ref 12–16)
IMM GRANULOCYTES # BLD AUTO: 0.25 K/UL (ref 0–0.04)
IMM GRANULOCYTES NFR BLD AUTO: 3 % (ref 0–0.5)
LYMPHOCYTES # BLD AUTO: 0.6 K/UL (ref 1–4.8)
LYMPHOCYTES NFR BLD: 6.8 % (ref 18–48)
MAGNESIUM SERPL-MCNC: 2 MG/DL (ref 1.6–2.6)
MCH RBC QN AUTO: 25.2 PG (ref 27–31)
MCHC RBC AUTO-ENTMCNC: 30.6 G/DL (ref 32–36)
MCV RBC AUTO: 83 FL (ref 82–98)
MONOCYTES # BLD AUTO: 0.7 K/UL (ref 0.3–1)
MONOCYTES NFR BLD: 7.9 % (ref 4–15)
NEUTROPHILS # BLD AUTO: 6.6 K/UL (ref 1.8–7.7)
NEUTROPHILS NFR BLD: 78 % (ref 38–73)
NRBC BLD-RTO: 0 /100 WBC
PHOSPHATE SERPL-MCNC: 3 MG/DL (ref 2.7–4.5)
PLATELET # BLD AUTO: 66 K/UL (ref 150–450)
PMV BLD AUTO: ABNORMAL FL (ref 9.2–12.9)
POTASSIUM SERPL-SCNC: 4.3 MMOL/L (ref 3.5–5.1)
RBC # BLD AUTO: 4.08 M/UL (ref 4–5.4)
SODIUM SERPL-SCNC: 144 MMOL/L (ref 136–145)
TACROLIMUS BLD-MCNC: 9.5 NG/ML (ref 5–15)
WBC # BLD AUTO: 8.39 K/UL (ref 3.9–12.7)

## 2024-05-28 PROCEDURE — 36415 COLL VENOUS BLD VENIPUNCTURE: CPT | Mod: HCNC | Performed by: INTERNAL MEDICINE

## 2024-05-28 PROCEDURE — 99999 PR PBB SHADOW E&M-EST. PATIENT-LVL III: CPT | Mod: PBBFAC,HCNC,,

## 2024-05-28 PROCEDURE — 80069 RENAL FUNCTION PANEL: CPT | Mod: HCNC | Performed by: INTERNAL MEDICINE

## 2024-05-28 PROCEDURE — 85025 COMPLETE CBC W/AUTO DIFF WBC: CPT | Mod: HCNC | Performed by: INTERNAL MEDICINE

## 2024-05-28 PROCEDURE — 80197 ASSAY OF TACROLIMUS: CPT | Mod: HCNC | Performed by: INTERNAL MEDICINE

## 2024-05-28 PROCEDURE — 83735 ASSAY OF MAGNESIUM: CPT | Mod: HCNC | Performed by: INTERNAL MEDICINE

## 2024-05-28 PROCEDURE — 99999 PR PBB SHADOW E&M-EST. PATIENT-LVL IV: CPT | Mod: PBBFAC,HCNC,,

## 2024-05-28 NOTE — TELEPHONE ENCOUNTER
Spoke to pt on phone, Pt is scheduled on 5/29/2024 with arrival time of 1pm for IR procedure at [facility] location. Pt aware and confirmed, Thanks

## 2024-05-28 NOTE — PROGRESS NOTES
Clinic Note: First Return to Clinic Post-  Kidney Transplant    Kathie Quezada  is a 64 y.o. female  S/p LEFT KIDNEY     transplant on 5/23/2024 (Kidney) for Diabetes Mellitus - Type II.      Discharge Course (Issues/Concerns): Patient has been slightly overwhelmed with being discharged and all that needs to be done in terms of checking blood glucose and blood pressures.     Objective:   Vitals:    05/28/24 0924   BP: (!) 134/58   Pulse: 88   Resp: 18   Temp: 97.3 °F (36.3 °C)       Met with patient and her caregiver in the clinic to review current medication list.     Current Outpatient Medications   Medication Sig Dispense Refill    atovaquone (MEPRON) 750 mg/5 mL Susp oral liquid Take 10 mLs (1,500 mg total) by mouth once daily. Stop 11/19/24 300 mL 5    blood sugar diagnostic Strp 1 each by Misc.(Non-Drug; Combo Route) route 3 (three) times daily. 100 each 2    blood-glucose meter Misc Use as instructed 1 each 0    calcium carbonate (TUMS) 200 mg calcium (500 mg) chewable tablet CHEW 2 tablets (1,000 mg total) by mouth 2 (two) times daily. 120 tablet 11    carvediloL (COREG) 6.25 MG tablet Take 1 tablet (6.25 mg total) by mouth 2 (two) times daily. 180 tablet 3    cholecalciferol, vitamin D3, 125 mcg (5,000 unit) Tab Take 1 tablet (5,000 Units total) by mouth once daily. 90 tablet 3    enoxaparin (LOVENOX) 40 mg/0.4 mL Syrg Inject 0.4 mLs (40 mg total) into the skin once daily. Stop 6/7/24 for 14 days 5.6 mL 0    EScitalopram oxalate (LEXAPRO) 10 MG tablet Take 1 tablet (10 mg total) by mouth every evening. 90 tablet 3    ezetimibe (ZETIA) 10 mg tablet Take 1 tablet (10 mg total) by mouth once daily. 90 tablet 3    famotidine (PEPCID) 20 MG tablet Take 1 tablet (20 mg total) by mouth every evening. 30 tablet 0    insulin aspart U-100 (NOVOLOG) 100 unit/mL (3 mL) InPn pen Inject 3 Units into the skin 3 (three) times daily. + SSI (TDD: 24) 6 mL 0    lancets Misc 1 each by Misc.(Non-Drug; Combo Route) route 3  "(three) times daily. 100 each 2    levocetirizine (XYZAL) 5 MG tablet Take 1 tablet (5 mg total) by mouth daily as needed for Allergies. 90 tablet 3    methocarbamoL (ROBAXIN) 500 MG Tab Take 1 tablet (500 mg total) by mouth 4 (four) times daily as needed (muscle spasms). 40 tablet 0    multivitamin Tab Take 1 tablet by mouth once daily.      mycophenolate (CELLCEPT) 250 mg Cap Take 4 capsules (1,000 mg total) by mouth 2 (two) times daily. 240 capsule 11    NIFEdipine (PROCARDIA-XL) 30 MG (OSM) 24 hr tablet Take 1 tablet (30 mg total) by mouth once daily. 30 tablet 5    oxyCODONE (ROXICODONE) 5 MG immediate release tablet Take 1 tablet (5 mg total) by mouth every 6 (six) hours as needed for Pain. 28 tablet 0    pen needle, diabetic 32 gauge x 5/32" Ndle 1 each by Misc.(Non-Drug; Combo Route) route 3 (three) times daily. 100 each 0    predniSONE (DELTASONE) 5 MG tablet Take by mouth daily; 5/26/2024-6/1/2024: 20 mg, 6/2/2024-6/8/2024: 15 mg; 6/9/2024-6/15/2024: 10 mg; 6/16/2024- forever: 5 mg; do not stop 70 tablet 11    SYNTHROID 200 mcg tablet Take 1 tablet (200 mcg total) by mouth before breakfast. 90 tablet 3    tacrolimus (PROGRAF) 1 MG Cap Take 6 capsules (6 mg total) by mouth every 12 (twelve) hours. 360 capsule 11    traZODone (DESYREL) 50 MG tablet Take 1 to 2 tablets ( mg total) by mouth nightly as needed for Insomnia. 120 tablet 3    valGANciclovir (VALCYTE) 450 mg Tab Take 2 tablets (900 mg total) by mouth every morning. Stop 8/21/24 60 tablet 2     No current facility-administered medications for this visit.       Pharmacy Interventions/Recommendations:     1) Graft Function & Immunosuppression Issues: Patient's SCr slightly increased to 1.4 from 1.2. Patient received thymoglobulin induction and maintained on tacrolimus 6 mg BID, Cellcept 1000 mg BID and Prednisone taper.      2) Opportunistic Infection prophylaxis:   PCP ppx: Atovaquone until 11/19/24  CMV ppx: Valcyte until 8/21/24  Fungal ppx: " N/A    3) Donor Serologies & Monitoring:     Donor CMV Status: Positive  Donor HCV Status: Negative  Donor HBcAb: Negative  Donor HBV MELISA: Negative  Donor HCV MELISA: Negative    4) Pain Management & Bowel Regimen: Patient discharged with oxycodone 5 mg every 6 hours as needed for pain and robaxin 500 mg QID PRN muscle spasms.  Patient states she is in pain but controlled with regimen.  Patient states she is having bowel movements.      5) Blood Pressure Management: Patient's BP - on coreg 6.25 mg BID and Nifedipine 30 mg daily.  Provided patient with hold parameters.  Coreg - hold for SBP <120 and Nifedipine - hold for SBP <130.  Patient did not check her BP this morning or last night.     6) Blood Sugar Management & Follow-up: Patient discharged on Novolog 3 units with meals + sliding scale insulin.  Provided patient with new blood glucose meter and supplies.  Patient stated she did not check her sugars last night or give herself any insulin.  Stated she was overwhelmed with all that needed to be done and to be back to clinic this morning.  Patient needs endocrine follow up as soon as possible to see if we can transition this patient to PO antihyperglycemics. Fasting glucose today 130 .     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     10) Reinforced medication education conducted in the hospital, including medication indications, dosing, administration, side effects, monitoring-- including timing of immunosuppressant levels.     Patient received their FIRST fill of medications from [facility].  Discussed the process for obtaining refills of medications, including verifying the dose and mailing address to have medications delivered.     Kathie and her caregivers  verbalized understanding and had the opportunity to ask questions.

## 2024-05-29 ENCOUNTER — PATIENT OUTREACH (OUTPATIENT)
Dept: ADMINISTRATIVE | Facility: CLINIC | Age: 64
End: 2024-05-29
Payer: MEDICARE

## 2024-05-29 ENCOUNTER — HOSPITAL ENCOUNTER (OUTPATIENT)
Dept: INTERVENTIONAL RADIOLOGY/VASCULAR | Facility: HOSPITAL | Age: 64
Discharge: HOME | End: 2024-05-29
Attending: NURSE PRACTITIONER
Payer: MEDICARE

## 2024-05-29 VITALS
WEIGHT: 235 LBS | HEIGHT: 69 IN | HEART RATE: 69 BPM | BODY MASS INDEX: 34.8 KG/M2 | RESPIRATION RATE: 16 BRPM | OXYGEN SATURATION: 99 % | TEMPERATURE: 98 F | SYSTOLIC BLOOD PRESSURE: 148 MMHG | DIASTOLIC BLOOD PRESSURE: 64 MMHG

## 2024-05-29 DIAGNOSIS — Z94.0 RENAL TRANSPLANT RECIPIENT: ICD-10-CM

## 2024-05-29 DIAGNOSIS — Z94.0 S/P KIDNEY TRANSPLANT: ICD-10-CM

## 2024-05-29 LAB — POCT GLUCOSE: 180 MG/DL (ref 70–110)

## 2024-05-29 PROCEDURE — 25000003 PHARM REV CODE 250: Performed by: STUDENT IN AN ORGANIZED HEALTH CARE EDUCATION/TRAINING PROGRAM

## 2024-05-29 PROCEDURE — 99900035 HC TECH TIME PER 15 MIN (STAT)

## 2024-05-29 PROCEDURE — 82962 GLUCOSE BLOOD TEST: CPT

## 2024-05-29 PROCEDURE — 94760 N-INVAS EAR/PLS OXIMETRY 1: CPT

## 2024-05-29 PROCEDURE — 36589 REMOVAL TUNNELED CV CATH: CPT | Mod: RT | Performed by: STUDENT IN AN ORGANIZED HEALTH CARE EDUCATION/TRAINING PROGRAM

## 2024-05-29 RX ORDER — SODIUM CHLORIDE 9 MG/ML
INJECTION, SOLUTION INTRAVENOUS CONTINUOUS
Status: DISCONTINUED | OUTPATIENT
Start: 2024-05-29 | End: 2024-05-30 | Stop reason: HOSPADM

## 2024-05-29 RX ORDER — OXYCODONE HYDROCHLORIDE 5 MG/1
5 TABLET ORAL EVERY 6 HOURS PRN
Status: DISCONTINUED | OUTPATIENT
Start: 2024-05-29 | End: 2024-05-30 | Stop reason: HOSPADM

## 2024-05-29 RX ORDER — TACROLIMUS 1 MG/1
2 CAPSULE ORAL ONCE
Status: DISCONTINUED | OUTPATIENT
Start: 2024-05-29 | End: 2024-05-30 | Stop reason: HOSPADM

## 2024-05-29 RX ORDER — FUROSEMIDE 10 MG/ML
80 INJECTION INTRAMUSCULAR; INTRAVENOUS ONCE
Status: DISCONTINUED | OUTPATIENT
Start: 2024-05-29 | End: 2024-05-30 | Stop reason: HOSPADM

## 2024-05-29 RX ORDER — LIDOCAINE HYDROCHLORIDE 10 MG/ML
1 INJECTION, SOLUTION EPIDURAL; INFILTRATION; INTRACAUDAL; PERINEURAL ONCE
Status: DISCONTINUED | OUTPATIENT
Start: 2024-05-29 | End: 2024-05-30 | Stop reason: HOSPADM

## 2024-05-29 RX ORDER — LIDOCAINE HYDROCHLORIDE 20 MG/ML
INJECTION, SOLUTION INFILTRATION; PERINEURAL
Status: COMPLETED | OUTPATIENT
Start: 2024-05-29 | End: 2024-05-29

## 2024-05-29 RX ORDER — OXYCODONE HYDROCHLORIDE 5 MG/1
10 TABLET ORAL EVERY 6 HOURS PRN
Status: DISCONTINUED | OUTPATIENT
Start: 2024-05-29 | End: 2024-05-30 | Stop reason: HOSPADM

## 2024-05-29 RX ORDER — PANTOPRAZOLE SODIUM 40 MG/1
40 TABLET, DELAYED RELEASE ORAL DAILY
Status: DISCONTINUED | OUTPATIENT
Start: 2024-05-29 | End: 2024-05-30 | Stop reason: HOSPADM

## 2024-05-29 RX ORDER — SODIUM BICARBONATE 650 MG/1
1300 TABLET ORAL 2 TIMES DAILY
Status: DISCONTINUED | OUTPATIENT
Start: 2024-05-29 | End: 2024-05-30 | Stop reason: HOSPADM

## 2024-05-29 RX ORDER — METHOCARBAMOL 500 MG/1
500 TABLET, FILM COATED ORAL 4 TIMES DAILY
Status: DISCONTINUED | OUTPATIENT
Start: 2024-05-29 | End: 2024-05-30 | Stop reason: HOSPADM

## 2024-05-29 RX ADMIN — LIDOCAINE HYDROCHLORIDE 20 ML: 20 INJECTION, SOLUTION INFILTRATION; PERINEURAL at 02:05

## 2024-05-29 NOTE — DISCHARGE SUMMARY
Radiology Discharge Summary      Hospital Course: No complications    Admit Date: 5/29/2024  Discharge Date: 05/29/2024     Instructions Given to Patient: Yes  Diet: Resume prior diet  Activity: activity as tolerated    Description of Condition on Discharge: Stable  Vital Signs (Most Recent): Temp: 98.2 °F (36.8 °C) (05/29/24 1353)  Pulse: 72 (05/29/24 1415)  Resp: 16 (05/29/24 1415)  BP: (!) 144/68 (05/29/24 1415)  SpO2: 98 % (05/29/24 1415)    Discharge Disposition: Home    Discharge Diagnosis: s/p renal transplant     Follow-up: As needed    Elaina Judd MD

## 2024-05-29 NOTE — PLAN OF CARE
Handoff report received from procedural team. VS stable on transfer and while in recovery.  Discharge instructions reviewed with patient. Pt verbalizes understanding. Questions encouraged and answered. No IV to remove.  Escorted to vehicle via wheelchair.

## 2024-05-29 NOTE — PROCEDURES
IR Procedure Note      Pre Op Diagnosis:  Tunneled line no longer needed / Infected   Post Op Diagnosis: Same    Procedure: Tunneled line removal    Procedure performed by:  Elaina Judd MD    Written Informed Consent Obtained: Yes  Specimen Removed: YES, tunneled HD catheter  Estimated Blood Loss: Minimal    Findings:   Local anesthesia was administered. Combination of blunt dissection and traction were used to removal the tunneled line en bloc. Hemostasis achieved with manual compression. Sterile dressing applied.     Patient tolerated procedure well.       Elaina Judd MD

## 2024-05-29 NOTE — H&P
H&P Note  Interventional Radiology    Reason for Consult: line removal    SUBJECTIVE:     Chief Complaint: s/p renal transplant    History of Present Illness: 64F PMHx ESRD 2/2 DM on HD via LUE AVF now s/p renal transplant 5/23/2024 who presents for tunneled dialysis catheter removal. Last HD about 1 week ago.     Past Medical History:   Diagnosis Date    Acute respiratory failure with hypoxia and hypercapnia 05/26/2019    ATN (acute tubular necrosis) 02/08/2019    Chronic diastolic heart failure 02/07/2019    2-8-19 Mild left atrial enlargement. Mild left ventricular enlargement. Normal left ventricular systolic function. The estimated ejection fraction is 55% Indeterminate left ventricular diastolic function. Normal right ventricular systolic function. Mild mitral regurgitation. Mild to moderate tricuspid regurgitation. The estimated PA systolic pressure is 32 mm Hg Normal central venous pressure (3 m    CKD (chronic kidney disease) stage 4, GFR 15-29 ml/min 05/16/2018    CKD stage G3a/A3, GFR 45-59 and albumin creatinine ratio >300 mg/g 05/16/2018    CKD stage G3b/A3, GFR 30-44 and albumin creatinine ratio >300 mg/g 05/16/2018    Closed compression fracture of L2 lumbar vertebra 05/24/2019    Closed compression fracture of L2 lumbar vertebra 05/24/2019     IMO Regulatory Load October 2019    Clotted dialysis access 07/22/2023    Controlled type 2 diabetes with retinopathy 04/09/2018    COVID-19 virus infection 7-1-2021 07/01/2021    CVA (cerebral vascular accident) 2017    Diabetic polyneuropathy associated with type 2 diabetes mellitus 12/04/2020    Encounter for blood transfusion     Essential hypertension 04/09/2018    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  7-31-19 Factor XI Activity 55 - 145 % 96       Gastroesophageal reflux disease without esophagitis 05/24/2019    Generalized anxiety disorder 11/08/2019    GERD (gastroesophageal reflux disease)     GIB (gastrointestinal bleeding)  10/18/2018    History of CVA (cerebrovascular accident) 11/27/2022    History of CVA (cerebrovascular accident) without residual deficits 11/27/2022    History of hepatitis C     s/p succesful Rx w/ [facility]  - [id number] (cure) 2016    Hypertensive retinopathy, bilateral 12/10/2019    Mild nonproliferative diabetic retinopathy 05/02/2018    Mixed hyperlipidemia 04/09/2018    Myalgia due to statin 09/07/2022    Normocytic anemia 01/14/2022    NS (nuclear sclerosis), bilateral 12/10/2019    Peanut allergy 02/04/2023    PNA (pneumonia) 05/28/2019    Postoperative hypothyroidism     Prophylactic immunotherapy 05/24/2024    Pulmonary hypertension 02/06/2023    Renovascular hypertension 04/09/2018    S/P kidney transplant 05/24/2024    S/P kyphoplasty 08/14/2019 8/14/19: L2 kyphoplasty with Dr. Kwon     Slow transit constipation 05/27/2019    Type 2 diabetes mellitus with circulatory disorder, with long-term current use of insulin 04/09/2018    Type 2 diabetes mellitus with stage 3 chronic kidney disease, without long-term current use of insulin 04/09/2018    Type 2 diabetes mellitus with stage 3a chronic kidney disease, without long-term current use of insulin 03/03/2021    Type 2 diabetes mellitus with stage 3b chronic kidney disease, without long-term current use of insulin 09/07/2022     Past Surgical History:   Procedure Laterality Date    AV FISTULA PLACEMENT Left 10/27/2022    Procedure: CREATION, AV FISTULA;  Surgeon: Tirso Tavera MD;  Location: [facility] OR [address] [place] [address];  Service: Peripheral Vascular;  Laterality: Left;  Brachio basilic 1st stage     BACK SURGERY  2017    CHOLECYSTECTOMY  2015    [facility]    COLONOSCOPY  11/13/2015    Dr Chino torres prep. internal hemorrhoids. diverticulosis of sigmois. NO polyps. repeat due 2025    FISTULOGRAM N/A 12/16/2022    Procedure: Fistulogram;  Surgeon: Tirso Tavera MD;  Location: [facility] CATH LAB;  Service: Peripheral Vascular;  Laterality: N/A;    FISTULOGRAM, WITH PTA  Left 3/23/2023    Procedure: FISTULOGRAM, WITH PTA;  Surgeon: Tirso Tavera MD;  Location: [facility] OR 2ND FLR;  Service: Peripheral Vascular;  Laterality: Left;  LUE TRANSRADIAL CO2 FISTULOGRAM    1.9 min  22.99 mGy  4.3032 Gy.cm  200cc CO2  9ml TransRadial Solution    FISTULOGRAM, WITH PTA Left 8/15/2023    Procedure: FISTULOGRAM, WITH PTA TRANSRADIAL;  Surgeon: Tirso Tavera MD;  Location: [facility] OR 2ND FLR;  Service: Peripheral Vascular;  Laterality: Left;  Fluoro: 5.1 min, mGy: 7.95, Gycm2: 1.2626, contrast: 12ml    FIXATION KYPHOPLASTY Bilateral 08/14/2019    Procedure: L2 kyphoplasty Fluoro Globus;  Surgeon: Jeremie Kwon DO;  Location: [facility] OR;  Service: Neurosurgery;  Laterality: Bilateral;  GLOBUS MACRINA WINSLOW [phone] TEXTED HER @ 10:54AM ON 7-30-19  PRE-OP-BY RN  8-7-2019    HYSTERECTOMY      KIDNEY TRANSPLANT N/A 5/23/2024    Procedure: TRANSPLANT, KIDNEY;  Surgeon: Stepan Bernardo MD;  Location: [facility] OR [facility] FLR;  Service: Transplant;  Laterality: N/A;    OOPHORECTOMY  1996    one    PERCUTANEOUS TRANSLUMINAL ANGIOPLASTY OF ARTERIOVENOUS FISTULA Left 12/16/2022    Procedure: PTA, AV FISTULA;  Surgeon: Tirso Tavera MD;  Location: [facility] CATH LAB;  Service: Peripheral Vascular;  Laterality: Left;    PERCUTANEOUS TRANSLUMINAL ANGIOPLASTY OF ARTERIOVENOUS FISTULA Left 11/30/2023    Procedure: PTA, AV FISTULA;  Surgeon: Tirso Tavera MD;  Location: [facility] CATH LAB;  Service: Peripheral Vascular;  Laterality: Left;  left transradial fistulagram    RIGHT HEART CATHETERIZATION Right 6/15/2023    Procedure: INSERTION, CATHETER, RIGHT HEART;  Surgeon: Lucretia Rivas MD;  Location: [facility] CATH LAB;  Service: Cardiology;  Laterality: Right;    THYROIDECTOMY  2016    at [facility] for thyroid nodule    TONSILLECTOMY      TRANSPOSITION OF BASILIC VEIN Left 1/31/2023    Procedure: TRANSPOSITION, VEIN, BASILIC;  Surgeon: Tirso Tavera MD;  Location: [facility] OR 2ND FLR;  Service: Peripheral Vascular;  Laterality:  Left;     Family History   Problem Relation Name Age of Onset    Stroke Mother      Heart disease Mother      Hypertension Mother      Diabetes type II Mother      Coronary artery disease Mother      Hypertension Father      Prostate cancer Father      Aneurysm Brother       Social History     Tobacco Use    Smoking status: Never     Passive exposure: Past    Smokeless tobacco: Never   Substance Use Topics    Alcohol use: Not Currently    Drug use: No       Review of Systems:  Constitutional/General:No fever, chills, change in appetite or weight loss.  Hematological/Immuno: no known coagulopathies  Respiratory: no shortness of breath  Cardiovascular: no chest pain  Gastrointestinal: no abdominal pain  Genito-Urinary: no dysuria  Musculoskeletal: negative  Skin: Negative for rash, itching, pigmentation changes, nail or hair changes.  Neurological: no TIA or stroke symptoms  Psychiatric: normal mood/affect, good insight/judgement      OBJECTIVE:     Vital Signs Range (Last 24H):  Temp:  [98.2 °F (36.8 °C)]   Pulse:  [73]   Resp:  [17]   BP: (128)/(67)   SpO2:  [98 %]     Physical Exam:  General- Patient AAO x3 in NAD  ENT- EOMI  Neck- No masses  CV- Normal rate; R chest wall tunneled dialysis catheter  Resp-  No increased WOB  GI- Non-distended, RLQ incision with staples  Extrem- No cyanosis, clubbing, edema  Derm- No rashes, masses, or lesions noted  Neuro-  No focal deficits noted    Physical Exam  Body mass index is 34.7 kg/m².    Scheduled Meds:    LIDOcaine (PF) 10 mg/ml (1%)  1 mL Other Once     Continuous Infusions:    sodium chloride 0.9%   Intravenous Continuous         PRN Meds:    Allergies:   Review of patient's allergies indicates:   Allergen Reactions    Atorvastatin Other (See Comments)     Statin induced myalgia    Mushroom Hives    Peanut Anaphylaxis    Bactrim [sulfamethoxazole-trimethoprim] Hives    Gabapentin      Pruritis     Iodine     Peanut butter flavor     Penicillins Hives    Shellfish  containing products Itching    Sulfa (sulfonamide antibiotics) Hives       Labs:  Recent Labs   Lab 05/27/24  1448   INR 1.1       Recent Labs   Lab 05/28/24  0846   WBC 8.39   HGB 10.3*   HCT 33.7*   MCV 83   PLT 66*      Recent Labs   Lab 05/28/24  0846   *      K 4.3      CO2 26   BUN 24*   CREATININE 1.4   CALCIUM 9.4   MG 2.0   ALBUMIN 3.1*       Vitals (Most Recent):  Temp: 98.2 °F (36.8 °C) (05/29/24 1353)  Pulse: 73 (05/29/24 1353)  Resp: 17 (05/29/24 1353)  BP: 128/67 (05/29/24 1353)  SpO2: 98 % (05/29/24 1353)    ASA: 3    Consent obtained.     ASSESSMENT/PLAN:     64F PMHx ESRD 2/2 DM on HD via LUE AVF now s/p renal transplant 5/23/2024 who presents for tunneled dialysis catheter removal with local anesthesia.     There are no hospital problems to display for this patient.          Elaina Judd MD

## 2024-05-29 NOTE — PLAN OF CARE
Chart reviewed. Preop nursing care completed per orders. Safe surgery checklist complete. Pt denies any open wounds cuts or sores.  Pt denies any metal in body.  Belongings in PACU locker 7. Dialysis fistula left arm. Pt AAOX4, VSS on room air. Pt toileted, Bed locked in lowest position, Call light within reach. Pt denies any needs at this time.

## 2024-05-29 NOTE — Clinical Note
Right: Chest.   Scrubbed with Povidone-Iodine.   Painted with None.  Hair: N/A.  Skin prep dry before draping.  Prepped by: Juan J Newman 5/29/2024 2:16 PM.

## 2024-05-30 ENCOUNTER — LAB VISIT (OUTPATIENT)
Dept: LAB | Facility: HOSPITAL | Age: 64
End: 2024-05-30
Attending: INTERNAL MEDICINE
Payer: MEDICARE

## 2024-05-30 DIAGNOSIS — Z94.0 KIDNEY REPLACED BY TRANSPLANT: ICD-10-CM

## 2024-05-30 LAB
ALBUMIN SERPL BCP-MCNC: 3.2 G/DL (ref 3.5–5.2)
ANION GAP SERPL CALC-SCNC: 10 MMOL/L (ref 8–16)
ANISOCYTOSIS BLD QL SMEAR: SLIGHT
BASOPHILS NFR BLD: 1 % (ref 0–1.9)
BUN SERPL-MCNC: 18 MG/DL (ref 8–23)
CALCIUM SERPL-MCNC: 9.3 MG/DL (ref 8.7–10.5)
CHLORIDE SERPL-SCNC: 107 MMOL/L (ref 95–110)
CO2 SERPL-SCNC: 24 MMOL/L (ref 23–29)
CREAT SERPL-MCNC: 1.3 MG/DL (ref 0.5–1.4)
DIFFERENTIAL METHOD BLD: ABNORMAL
EOSINOPHIL NFR BLD: 2 % (ref 0–8)
ERYTHROCYTE [DISTWIDTH] IN BLOOD BY AUTOMATED COUNT: 17.7 % (ref 11.5–14.5)
EST. GFR  (NO RACE VARIABLE): 46.5 ML/MIN/1.73 M^2
GLUCOSE SERPL-MCNC: 150 MG/DL (ref 70–110)
HCT VFR BLD AUTO: 31.9 % (ref 37–48.5)
HGB BLD-MCNC: 9.9 G/DL (ref 12–16)
IMM GRANULOCYTES # BLD AUTO: ABNORMAL K/UL (ref 0–0.04)
IMM GRANULOCYTES NFR BLD AUTO: ABNORMAL % (ref 0–0.5)
LYMPHOCYTES NFR BLD: 5 % (ref 18–48)
MAGNESIUM SERPL-MCNC: 2 MG/DL (ref 1.6–2.6)
MCH RBC QN AUTO: 25.7 PG (ref 27–31)
MCHC RBC AUTO-ENTMCNC: 31 G/DL (ref 32–36)
MCV RBC AUTO: 83 FL (ref 82–98)
MONOCYTES NFR BLD: 2 % (ref 4–15)
MYELOCYTES NFR BLD MANUAL: 2 %
NEUTROPHILS NFR BLD: 84 % (ref 38–73)
NEUTS BAND NFR BLD MANUAL: 4 %
NRBC BLD-RTO: 0 /100 WBC
OVALOCYTES BLD QL SMEAR: ABNORMAL
PHOSPHATE SERPL-MCNC: 3.6 MG/DL (ref 2.7–4.5)
PLATELET # BLD AUTO: 122 K/UL (ref 150–450)
PMV BLD AUTO: 11.7 FL (ref 9.2–12.9)
POIKILOCYTOSIS BLD QL SMEAR: SLIGHT
POLYCHROMASIA BLD QL SMEAR: ABNORMAL
POTASSIUM SERPL-SCNC: 4 MMOL/L (ref 3.5–5.1)
RBC # BLD AUTO: 3.85 M/UL (ref 4–5.4)
SODIUM SERPL-SCNC: 141 MMOL/L (ref 136–145)
TACROLIMUS BLD-MCNC: 9.1 NG/ML (ref 5–15)
WBC # BLD AUTO: 9.9 K/UL (ref 3.9–12.7)

## 2024-05-30 PROCEDURE — 36415 COLL VENOUS BLD VENIPUNCTURE: CPT | Mod: HCNC | Performed by: INTERNAL MEDICINE

## 2024-05-30 PROCEDURE — 80197 ASSAY OF TACROLIMUS: CPT | Mod: HCNC | Performed by: INTERNAL MEDICINE

## 2024-05-30 PROCEDURE — 85027 COMPLETE CBC AUTOMATED: CPT | Mod: HCNC | Performed by: INTERNAL MEDICINE

## 2024-05-30 PROCEDURE — 85007 BL SMEAR W/DIFF WBC COUNT: CPT | Mod: HCNC | Performed by: INTERNAL MEDICINE

## 2024-05-30 PROCEDURE — 80069 RENAL FUNCTION PANEL: CPT | Mod: HCNC | Performed by: INTERNAL MEDICINE

## 2024-05-30 PROCEDURE — 83735 ASSAY OF MAGNESIUM: CPT | Mod: HCNC | Performed by: INTERNAL MEDICINE

## 2024-05-31 DIAGNOSIS — Z57.8 EMPLOYEE EXPOSURE TO BLOOD: ICD-10-CM

## 2024-05-31 DIAGNOSIS — N34.2 INFECTIVE URETHRITIS: ICD-10-CM

## 2024-05-31 DIAGNOSIS — D63.1 ANEMIA OF RENAL DISEASE: ICD-10-CM

## 2024-05-31 DIAGNOSIS — Z94.0 KIDNEY REPLACED BY TRANSPLANT: Primary | ICD-10-CM

## 2024-05-31 DIAGNOSIS — N18.9 ANEMIA OF RENAL DISEASE: ICD-10-CM

## 2024-05-31 DIAGNOSIS — Z91.89 PERSONAL HISTORY OF POISONING, PRESENTING HAZARDS TO HEALTH: ICD-10-CM

## 2024-05-31 SDOH — SOCIAL DETERMINANTS OF HEALTH (SDOH): OCCUPATIONAL EXPOSURE TO OTHER RISK FACTORS: Z57.8

## 2024-05-31 NOTE — PROGRESS NOTES
"1ST NURSING VISIT POST DISCHARGE NOTE    1st RN appointment with Kathie Quezada post discharge 5/27 s/p kidney transplant 5/23/24.  Patient's daughter accompanied her.  Patient reports none.  Patient says that she that she is sleeping well.  Incision intact with staples.  Patient has  no lines or mera .  Patient that she is able to explain daily incision care and showering instructions.  Reviewed I&O monitoring, measuring, and recording, and the need for hydration (i.e., at least 2 liters of water daily with minimal caffeine and no grapefruit products).  Medication list and rationale were reviewed.  Patient did bring blue medication card and medication bottles for review.  Patient reports that she has stopped Dulcolax and has continued Colace.  Patient has had a bowel movement.  Patient expressed understanding of daily care including BID VS, medications, and I&O documentation.  Patient made aware of today's creatinine level: 1.4.  Patient aware that coordinator will review today's labs with a transplant physician and call the patient with any dose changes indicated.  Next lab appointment scheduled for thursday.  First post-operative transplant team appointment with labs scheduled for 6/5.    Using the Kidney Transplant Patient Reference Manual, the patient submitted her open book "Self-assessment of Kidney Transplant Patient Knowledge" test, which was completed in the transplant clinic this morning before 1st nursing visit.  This test includes questions regarding critical dose medications commonly used after kidney transplant, medication dosing and side effects, importance of timed lab draws, important signs and symptoms to report 24/7 immediately post-transplant as well as how to contact the transplant team 24/7.    Test Score: 24/25    After completing the test, the patient was given a copy of the Self-assessment Answer Key to reinforce accurate learning of test content.  Patient expressed her understanding of " the value of the information included in the self-assessment test.    Pt educated on how to properly obtain a midstream urine collection. Pt verbalized understanding.

## 2024-06-04 DIAGNOSIS — Z94.0 S/P KIDNEY TRANSPLANT: ICD-10-CM

## 2024-06-04 NOTE — PROGRESS NOTES
Kidney Post-Transplant Assessment    Referring Physician: Fred Kelly  Current Nephrologist: Fred Kelly    ORGAN: LEFT KIDNEY  Donor Type: living  PHS Increased Risk: no  Cold Ischemia: 60 mins  Induction Medications:     Subjective:     CC:  Reassessment of renal allograft function and management of chronic immunosuppression.    HPI:  Ms. Quezada is a 64 y.o. year old Black or  female with history of ESRD secondary to DM2 who received a living kidney transplant on 5/23/24. She has a history of CVA in 2017 without any deficit. She also has factor XII deficiency, history of hep C (s/p treatment with harvoni), and pHTN (RHC performed on 6/7/23 and found to have group 2 HTN. TTE and cardiac PET with preserved EF).  Her most recent creatinine is 1.4. She takes mycophenolate mofetil, prednisone, and tacrolimus for maintenance immunosuppression. Her post transplant course has been uncomplicated to date.    Due to factor XII deficiency she received 1 unit of FFP in OR and 1 unit of FFP on POD#1. She received standard DVT prophylaxis while inpatient and was transitioned to prophylaxis lovenox x 2 weeks on discharge.     Feels great without complaints. Drinking 6 bottles of water daily, no problems with urination. BP at goal, no peripheral edema. Incision healing nicely with staples, still with mild discomfort. Appetite good, weight stable. Tolerating IS without issues, no diarrhea or vomiting.     Current Outpatient Medications   Medication Sig Dispense Refill    atovaquone (MEPRON) 750 mg/5 mL Susp oral liquid Take 10 mLs (1,500 mg total) by mouth once daily. Stop 11/19/24 300 mL 5    blood sugar diagnostic Strp 1 each by Misc.(Non-Drug; Combo Route) route 3 (three) times daily. 100 each 2    blood-glucose meter Misc Use as instructed 1 each 0    calcium carbonate (TUMS) 200 mg calcium (500 mg) chewable tablet CHEW 2 tablets (1,000 mg total) by mouth 2 (two) times daily. 120 tablet 11     "carvediloL (COREG) 6.25 MG tablet Take 1 tablet (6.25 mg total) by mouth 2 (two) times daily. 180 tablet 3    cholecalciferol, vitamin D3, 125 mcg (5,000 unit) Tab Take 1 tablet (5,000 Units total) by mouth once daily. 90 tablet 3    enoxaparin (LOVENOX) 40 mg/0.4 mL Syrg Inject 0.4 mLs (40 mg total) into the skin once daily. Stop 6/7/24 for 14 days 5.6 mL 0    EScitalopram oxalate (LEXAPRO) 10 MG tablet Take 1 tablet (10 mg total) by mouth every evening. 90 tablet 3    ezetimibe (ZETIA) 10 mg tablet Take 1 tablet (10 mg total) by mouth once daily. 90 tablet 3    famotidine (PEPCID) 20 MG tablet Take 1 tablet (20 mg total) by mouth every evening. 30 tablet 0    insulin aspart U-100 (NOVOLOG) 100 unit/mL (3 mL) InPn pen Inject 3 Units into the skin 3 (three) times daily. + SSI (TDD: 24) 6 mL 0    lancets Misc 1 each by Misc.(Non-Drug; Combo Route) route 3 (three) times daily. 100 each 2    levocetirizine (XYZAL) 5 MG tablet Take 1 tablet (5 mg total) by mouth daily as needed for Allergies. 90 tablet 3    methocarbamoL (ROBAXIN) 500 MG Tab Take 1 tablet (500 mg total) by mouth 4 (four) times daily as needed (muscle spasms). 40 tablet 0    multivitamin Tab Take 1 tablet by mouth once daily.      mycophenolate (CELLCEPT) 250 mg Cap Take 4 capsules (1,000 mg total) by mouth 2 (two) times daily. 240 capsule 11    NIFEdipine (PROCARDIA-XL) 30 MG (OSM) 24 hr tablet Take 1 tablet (30 mg total) by mouth once daily. 30 tablet 5    oxyCODONE (ROXICODONE) 5 MG immediate release tablet Take 1 tablet (5 mg total) by mouth every 6 (six) hours as needed for Pain. 28 tablet 0    pen needle, diabetic 32 gauge x 5/32" Ndle 1 each by Misc.(Non-Drug; Combo Route) route 3 (three) times daily. 100 each 0    predniSONE (DELTASONE) 5 MG tablet Take by mouth daily; 5/26/2024-6/1/2024: 20 mg, 6/2/2024-6/8/2024: 15 mg; 6/9/2024-6/15/2024: 10 mg; 6/16/2024- forever: 5 mg; do not stop 70 tablet 11    SYNTHROID 200 mcg tablet Take 1 tablet (200 " mcg total) by mouth before breakfast. 90 tablet 3    tacrolimus (PROGRAF) 1 MG Cap Take 5 capsules (5 mg total) by mouth every 12 (twelve) hours. 300 capsule 11    traZODone (DESYREL) 50 MG tablet Take 1 to 2 tablets ( mg total) by mouth nightly as needed for Insomnia. 120 tablet 3    valGANciclovir (VALCYTE) 450 mg Tab Take 2 tablets (900 mg total) by mouth every morning. Stop 8/21/24 60 tablet 2     No current facility-administered medications for this visit.       Past Medical History:   Diagnosis Date    Acute respiratory failure with hypoxia and hypercapnia 05/26/2019    ATN (acute tubular necrosis) 02/08/2019    Chronic diastolic heart failure 02/07/2019 2-8-19 Mild left atrial enlargement. Mild left ventricular enlargement. Normal left ventricular systolic function. The estimated ejection fraction is 55% Indeterminate left ventricular diastolic function. Normal right ventricular systolic function. Mild mitral regurgitation. Mild to moderate tricuspid regurgitation. The estimated PA systolic pressure is 32 mm Hg Normal central venous pressure (3 m    CKD (chronic kidney disease) stage 4, GFR 15-29 ml/min 05/16/2018    CKD stage G3a/A3, GFR 45-59 and albumin creatinine ratio >300 mg/g 05/16/2018    CKD stage G3b/A3, GFR 30-44 and albumin creatinine ratio >300 mg/g 05/16/2018    Closed compression fracture of L2 lumbar vertebra 05/24/2019    Closed compression fracture of L2 lumbar vertebra 05/24/2019     IMO Regulatory Load October 2019    Clotted dialysis access 07/22/2023    Controlled type 2 diabetes with retinopathy 04/09/2018    COVID-19 virus infection 7-1-2021 07/01/2021    CVA (cerebral vascular accident) 2017    Diabetic polyneuropathy associated with type 2 diabetes mellitus 12/04/2020    Encounter for blood transfusion     Essential hypertension 04/09/2018    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  7-31-19 Factor XI Activity 55 - 145 % 96       Gastroesophageal reflux  disease without esophagitis 05/24/2019    Generalized anxiety disorder 11/08/2019    GERD (gastroesophageal reflux disease)     GIB (gastrointestinal bleeding) 10/18/2018    History of CVA (cerebrovascular accident) 11/27/2022    History of CVA (cerebrovascular accident) without residual deficits 11/27/2022    History of hepatitis C     s/p succesful Rx w/ Harvoni  - SVR12 (cure) 2016    Hypertensive retinopathy, bilateral 12/10/2019    Mild nonproliferative diabetic retinopathy 05/02/2018    Mixed hyperlipidemia 04/09/2018    Myalgia due to statin 09/07/2022    Normocytic anemia 01/14/2022    NS (nuclear sclerosis), bilateral 12/10/2019    Peanut allergy 02/04/2023    PNA (pneumonia) 05/28/2019    Postoperative hypothyroidism     Prophylactic immunotherapy 05/24/2024    Pulmonary hypertension 02/06/2023    Renovascular hypertension 04/09/2018    S/P kidney transplant 05/24/2024    S/P kyphoplasty 08/14/2019 8/14/19: L2 kyphoplasty with Dr. Kwon     Slow transit constipation 05/27/2019    Type 2 diabetes mellitus with circulatory disorder, with long-term current use of insulin 04/09/2018    Type 2 diabetes mellitus with stage 3 chronic kidney disease, without long-term current use of insulin 04/09/2018    Type 2 diabetes mellitus with stage 3a chronic kidney disease, without long-term current use of insulin 03/03/2021    Type 2 diabetes mellitus with stage 3b chronic kidney disease, without long-term current use of insulin 09/07/2022       Review of Systems   Constitutional:  Negative for activity change and fever.   Eyes:  Positive for visual disturbance.        Wears glasses   Respiratory:  Negative for shortness of breath.    Cardiovascular:  Negative for chest pain and leg swelling.   Gastrointestinal:  Negative for constipation, diarrhea and nausea.   Genitourinary:  Negative for difficulty urinating, frequency and hematuria.   Musculoskeletal:  Negative for arthralgias and myalgias.   Skin:  Positive for  "wound.   Allergic/Immunologic: Positive for immunocompromised state.   Neurological:  Negative for weakness and numbness.   Psychiatric/Behavioral:  Negative for sleep disturbance. The patient is not nervous/anxious.        Objective:   Blood pressure 139/72, pulse 76, temperature 97.2 °F (36.2 °C), temperature source Temporal, resp. rate 16, height 5' 9" (1.753 m), weight 104.2 kg (229 lb 11.5 oz), SpO2 99%.body mass index is 33.92 kg/m².    Physical Exam  Vitals and nursing note reviewed.   Constitutional:       Appearance: Normal appearance.   Cardiovascular:      Rate and Rhythm: Normal rate and regular rhythm.      Heart sounds: Normal heart sounds.   Pulmonary:      Effort: Pulmonary effort is normal.      Breath sounds: Normal breath sounds.   Abdominal:      General: There is no distension.      Comments: Surgical incision well approximated with staples, no redness or drainage   Musculoskeletal:         General: Normal range of motion.   Skin:     General: Skin is warm and dry.   Neurological:      Mental Status: She is alert.         Labs:  Lab Results   Component Value Date    WBC 12.42 06/03/2024    HGB 9.5 (L) 06/03/2024    HCT 31.0 (L) 06/03/2024     06/03/2024    K 4.4 06/03/2024     06/03/2024    CO2 24 06/03/2024    BUN 16 06/03/2024    CREATININE 1.4 06/03/2024    EGFRNORACEVR 42.5 (A) 06/03/2024    CALCIUM 8.9 06/03/2024    PHOS 4.0 06/03/2024    MG 1.8 06/03/2024    ALBUMIN 3.4 (L) 06/03/2024    AST 17 05/02/2024    ALT 11 05/02/2024    UTPCR 0.12 06/03/2024    .7 (H) 08/10/2023    TACROLIMUS 13.0 06/03/2024       Labs were reviewed with the patient    Assessment:     1. Kidney replaced by transplant    2. Long-term use of immunosuppressant medication    3. History of hepatitis C    4. Renovascular hypertension    5. At risk for opportunistic infections      Plan:   Ureteral stent removal and surgery visit scheduled 6/12        1. Immunosuppression: Prograf trough 13.0, which is " SUPRAtherapeutic. Continue Prograf 5/5 (dose held and decreased 6/4/2024), MMF 1000 mg BID, and Prednisone taper. Will continue to monitor for drug toxicities    2. Allograft Function: Stable. Continue good oral hydration.   - ESRD secondary to DM2 s/p living kidney transplant on 5/23/24.   - post transplant course has been uncomplicated to date.  Lab Results   Component Value Date    CREATININE 1.4 06/03/2024      Latest Reference Range & Units POD 11,  Kidney-Post 1 Year  06/03/24   eGFR >60 mL/min/1.73 m^2 42.5 !     3. Hypertension management: advise low salt diet and home BP monitoring      4. Metabolic Bone Disease/Secondary Hyperparathyroidism:  Lab Results   Component Value Date    .7 (H) 08/10/2023    CALCIUM 8.9 06/03/2024    PHOS 4.0 06/03/2024      Latest Reference Range & Units POD 11,  Kidney-Post 1 Year  06/03/24   Magnesium  1.6 - 2.6 mg/dL 1.8       5. Electrolytes: stable. No need for intervention   Lab Results   Component Value Date     06/03/2024    K 4.4 06/03/2024     06/03/2024    CO2 24 06/03/2024       6. Anemia: stable. No need for intervention   Lab Results   Component Value Date    WBC 12.42 06/03/2024    HGB 9.5 (L) 06/03/2024    HCT 31.0 (L) 06/03/2024    MCV 84 06/03/2024     06/03/2024         7. Proteinuria: continue p/c ratio as per guidelines   [facility] [place] 0.12    8. BK virus infection screening:  BK PCR per protocol     9. Weight education: provided during the clinic visit   Body mass index is 33.92 kg/m².     10. Patient safety education regarding immunosuppression including prophylaxis posttransplant for CMV, PCP    PCP ppx: Atovaquone until 11/19/24  CMV ppx: Valcyte until 8/21/24     Follow-up:   Clinic: return to transplant clinic weekly for the first month after transplant; every 2 weeks during months 2-3; then at 6-, 9-, 12-, 18-, 24-, and 36- months post-transplant to reassess for complications from immunosuppression toxicity and monitor for  rejection.  Annually thereafter.    Labs: since patient remains at high risk for rejection and drug-related complications that warrant close monitoring, labs will be ordered as follows: continue twice weekly CBC, renal panel, and drug level for first month; then same labs once weekly through 3rd month post-transplant.  Urine for UA and protein/creatinine ratio monthly.  Serum BK - PCR at 1-, 3-, 6-, 9-, 12-, 18-, 24-, 36-, 48-, and 60 months post-transplant.  Hepatic panel at 1-, 2-, 3-, 6-, 9-, 12-, 18-, 24-, and 36- months post-transplant.    Mervat Rashid NP       Education:   Material provided to the patient.  Patient reminded to call with any health changes since these can be early signs of significant complications.  Also, I advised the patient to be sure any new medications or changes of old medications are discussed with either a pharmacist or physician knowledgeable with transplant to avoid rejection/drug toxicity related to significant drug interactions.    Patient advised that it is recommended that all transplanted patients, and their close contacts and household members receive Covid vaccination.

## 2024-06-04 NOTE — TELEPHONE ENCOUNTER
D/w patient to hold FK. Pt verbalized understanding.   Labs Thursday.     ----- Message from Leticia Lares DO sent at 6/4/2024  3:55 PM CDT -----  High FK. Hold FK dose tonight and decrease FK to 5 mg BID from 6 mg BID

## 2024-06-05 ENCOUNTER — OFFICE VISIT (OUTPATIENT)
Dept: TRANSPLANT | Facility: CLINIC | Age: 64
End: 2024-06-05
Payer: MEDICARE

## 2024-06-05 VITALS
SYSTOLIC BLOOD PRESSURE: 139 MMHG | DIASTOLIC BLOOD PRESSURE: 72 MMHG | OXYGEN SATURATION: 99 % | RESPIRATION RATE: 16 BRPM | BODY MASS INDEX: 34.03 KG/M2 | WEIGHT: 229.75 LBS | HEIGHT: 69 IN | HEART RATE: 76 BPM | TEMPERATURE: 97 F

## 2024-06-05 DIAGNOSIS — I15.0 RENOVASCULAR HYPERTENSION: ICD-10-CM

## 2024-06-05 DIAGNOSIS — Z91.89 AT RISK FOR OPPORTUNISTIC INFECTIONS: ICD-10-CM

## 2024-06-05 DIAGNOSIS — Z94.0 KIDNEY REPLACED BY TRANSPLANT: Primary | ICD-10-CM

## 2024-06-05 DIAGNOSIS — Z86.19 HISTORY OF HEPATITIS C: ICD-10-CM

## 2024-06-05 DIAGNOSIS — Z79.60 LONG-TERM USE OF IMMUNOSUPPRESSANT MEDICATION: ICD-10-CM

## 2024-06-05 PROCEDURE — 99215 OFFICE O/P EST HI 40 MIN: CPT | Mod: HCNC,S$GLB,, | Performed by: NURSE PRACTITIONER

## 2024-06-05 PROCEDURE — 3008F BODY MASS INDEX DOCD: CPT | Mod: HCNC,CPTII,S$GLB, | Performed by: NURSE PRACTITIONER

## 2024-06-05 PROCEDURE — 1111F DSCHRG MED/CURRENT MED MERGE: CPT | Mod: HCNC,CPTII,S$GLB, | Performed by: NURSE PRACTITIONER

## 2024-06-05 PROCEDURE — 3066F NEPHROPATHY DOC TX: CPT | Mod: HCNC,CPTII,S$GLB, | Performed by: NURSE PRACTITIONER

## 2024-06-05 PROCEDURE — 1159F MED LIST DOCD IN RCRD: CPT | Mod: HCNC,CPTII,S$GLB, | Performed by: NURSE PRACTITIONER

## 2024-06-05 PROCEDURE — 1160F RVW MEDS BY RX/DR IN RCRD: CPT | Mod: HCNC,CPTII,S$GLB, | Performed by: NURSE PRACTITIONER

## 2024-06-05 PROCEDURE — 3078F DIAST BP <80 MM HG: CPT | Mod: HCNC,CPTII,S$GLB, | Performed by: NURSE PRACTITIONER

## 2024-06-05 PROCEDURE — 4010F ACE/ARB THERAPY RXD/TAKEN: CPT | Mod: HCNC,CPTII,S$GLB, | Performed by: NURSE PRACTITIONER

## 2024-06-05 PROCEDURE — 3075F SYST BP GE 130 - 139MM HG: CPT | Mod: HCNC,CPTII,S$GLB, | Performed by: NURSE PRACTITIONER

## 2024-06-05 PROCEDURE — 3044F HG A1C LEVEL LT 7.0%: CPT | Mod: HCNC,CPTII,S$GLB, | Performed by: NURSE PRACTITIONER

## 2024-06-05 PROCEDURE — 99999 PR PBB SHADOW E&M-EST. PATIENT-LVL V: CPT | Mod: PBBFAC,HCNC,, | Performed by: NURSE PRACTITIONER

## 2024-06-05 RX ORDER — TACROLIMUS 1 MG/1
5 CAPSULE ORAL EVERY 12 HOURS
Qty: 300 CAPSULE | Refills: 11 | Status: SHIPPED | OUTPATIENT
Start: 2024-06-05 | End: 2024-06-11 | Stop reason: DRUGHIGH

## 2024-06-05 NOTE — LETTER
June 5, 2024        Fred Kelly  [address]  [place] [address]  Phone: [phone]  Fax: [phone]             [facility]  [address]  [place] [address]  Phone: [phone]   Patient: Kathie Quezada   MR Number: [id number]   YOB: 1960   Date of Visit: 6/5/2024       Dear Dr. Fred Kelly    Thank you for referring Kathie Quezada to me for evaluation. Attached you will find relevant portions of my assessment and plan of care.    If you have questions, please do not hesitate to call me. I look forward to following Kathie Quezada along with you.    Sincerely,    Mervat Rashid, NP    Enclosure    If you would like to receive this communication electronically, please contact [email or web address] or [phone] to request [employer] access.    [employer] is a tool which provides read-only access to select patient information with whom you have a relationship. Its easy to use and provides real time access to review your patients record including encounter summaries, notes, results, and demographic information.    If you feel you have received this communication in error or would no longer like to receive these types of communications, please e-mail [email or web address]

## 2024-06-11 DIAGNOSIS — Z94.0 S/P KIDNEY TRANSPLANT: ICD-10-CM

## 2024-06-11 RX ORDER — TACROLIMUS 1 MG/1
CAPSULE ORAL
Qty: 330 CAPSULE | Refills: 11 | Status: SHIPPED | OUTPATIENT
Start: 2024-06-11 | End: 2024-06-19 | Stop reason: DRUGHIGH

## 2024-06-11 NOTE — TELEPHONE ENCOUNTER
Message sent.     ----- Message from Leticia Lares DO sent at 6/11/2024 11:39 AM CDT -----  Low Fk. Increase FK to 6/5 from 5 mg BID

## 2024-06-12 ENCOUNTER — PROCEDURE VISIT (OUTPATIENT)
Dept: UROLOGY | Facility: CLINIC | Age: 64
End: 2024-06-12
Payer: MEDICARE

## 2024-06-12 VITALS
SYSTOLIC BLOOD PRESSURE: 118 MMHG | RESPIRATION RATE: 17 BRPM | WEIGHT: 230.25 LBS | TEMPERATURE: 97 F | BODY MASS INDEX: 34.01 KG/M2 | DIASTOLIC BLOOD PRESSURE: 77 MMHG | HEART RATE: 75 BPM

## 2024-06-12 DIAGNOSIS — Z94.0 KIDNEY REPLACED BY TRANSPLANT: ICD-10-CM

## 2024-06-12 PROCEDURE — 52310 CYSTOSCOPY AND TREATMENT: CPT | Mod: HCNC,S$GLB,, | Performed by: UROLOGY

## 2024-06-12 RX ORDER — DOXYCYCLINE HYCLATE 100 MG
100 TABLET ORAL
Status: COMPLETED | OUTPATIENT
Start: 2024-06-12 | End: 2024-06-12

## 2024-06-12 RX ORDER — LIDOCAINE HYDROCHLORIDE 20 MG/ML
JELLY TOPICAL
Status: COMPLETED | OUTPATIENT
Start: 2024-06-12 | End: 2024-06-12

## 2024-06-12 RX ADMIN — LIDOCAINE HYDROCHLORIDE: 20 JELLY TOPICAL at 09:06

## 2024-06-12 RX ADMIN — Medication 100 MG: at 09:06

## 2024-06-12 NOTE — PROCEDURES
Cystoscopy w/ stent removal    Date/Time: 6/12/2024 9:45 AM    Performed by: Desire Lopez MD  Authorized by: Leticia Lares DO  Comments: Procedure: Flexible cysto-uretheroscopy and stent removal   Pre Procedure Diagnosis:s/p kidney transplant   Post Procedure Diagnosis:same   Surgeon: Desire Lopez MD   Anesthesia: 2% uro-jet lidocaine jelly for local analgesia   Flexible cysto-urethroscopy was performed after consent was obtained. The risks and benefits were explained.   2% lidocaine urojet was used for local analgesia.   The genitalia was prepped and draped in the sterile fashion with betadine.   The flexible scope was advanced into the urethra and into the bladder. The stent was removed without difficulty.   The patient tolerated the procedure well without complication.   They will follow up with transplant.   Doxy x 1

## 2024-06-12 NOTE — PATIENT INSTRUCTIONS
What to Expect After a Cystoscopy with Stent Removal  For the next 24-48 hours, you may feel a mild burning when you urinate. This burning is normal and expected. Drink plenty of water to dilute the urine to help relieve the burning sensation. You may also see a small amount of blood in your urine after the procedure.    Unless you are already taking antibiotics, you may be given an antibiotic after the test to prevent infection.    Signs and Symptoms to Report  Call the [facility] at [phone] if you develop any of the following:  Fever of 101 degrees or higher  Chills or persistent bleeding  Inability to urinate    After hours or on weekends, you may reach a urology resident on call at this number: [phone].

## 2024-06-18 ENCOUNTER — LAB VISIT (OUTPATIENT)
Dept: LAB | Facility: HOSPITAL | Age: 64
End: 2024-06-18
Attending: INTERNAL MEDICINE
Payer: MEDICARE

## 2024-06-18 DIAGNOSIS — Z94.0 KIDNEY REPLACED BY TRANSPLANT: ICD-10-CM

## 2024-06-18 LAB
ALBUMIN SERPL BCP-MCNC: 3.6 G/DL (ref 3.5–5.2)
ANION GAP SERPL CALC-SCNC: 6 MMOL/L (ref 8–16)
BASOPHILS # BLD AUTO: 0.06 K/UL (ref 0–0.2)
BASOPHILS NFR BLD: 0.8 % (ref 0–1.9)
BUN SERPL-MCNC: 13 MG/DL (ref 8–23)
CALCIUM SERPL-MCNC: 9.4 MG/DL (ref 8.7–10.5)
CHLORIDE SERPL-SCNC: 106 MMOL/L (ref 95–110)
CO2 SERPL-SCNC: 28 MMOL/L (ref 23–29)
CREAT SERPL-MCNC: 1.3 MG/DL (ref 0.5–1.4)
DIFFERENTIAL METHOD BLD: ABNORMAL
EOSINOPHIL # BLD AUTO: 0.2 K/UL (ref 0–0.5)
EOSINOPHIL NFR BLD: 2.8 % (ref 0–8)
ERYTHROCYTE [DISTWIDTH] IN BLOOD BY AUTOMATED COUNT: 17.6 % (ref 11.5–14.5)
EST. GFR  (NO RACE VARIABLE): 46.5 ML/MIN/1.73 M^2
GLUCOSE SERPL-MCNC: 188 MG/DL (ref 70–110)
HCT VFR BLD AUTO: 33.3 % (ref 37–48.5)
HGB BLD-MCNC: 10.5 G/DL (ref 12–16)
IMM GRANULOCYTES # BLD AUTO: 0.03 K/UL (ref 0–0.04)
IMM GRANULOCYTES NFR BLD AUTO: 0.4 % (ref 0–0.5)
LYMPHOCYTES # BLD AUTO: 1.3 K/UL (ref 1–4.8)
LYMPHOCYTES NFR BLD: 17.2 % (ref 18–48)
MAGNESIUM SERPL-MCNC: 1.6 MG/DL (ref 1.6–2.6)
MCH RBC QN AUTO: 27.1 PG (ref 27–31)
MCHC RBC AUTO-ENTMCNC: 31.5 G/DL (ref 32–36)
MCV RBC AUTO: 86 FL (ref 82–98)
MONOCYTES # BLD AUTO: 0.2 K/UL (ref 0.3–1)
MONOCYTES NFR BLD: 2.6 % (ref 4–15)
NEUTROPHILS # BLD AUTO: 5.6 K/UL (ref 1.8–7.7)
NEUTROPHILS NFR BLD: 76.2 % (ref 38–73)
NRBC BLD-RTO: 0 /100 WBC
PHOSPHATE SERPL-MCNC: 3.2 MG/DL (ref 2.7–4.5)
PLATELET # BLD AUTO: 207 K/UL (ref 150–450)
PMV BLD AUTO: 10.6 FL (ref 9.2–12.9)
POTASSIUM SERPL-SCNC: 4.7 MMOL/L (ref 3.5–5.1)
RBC # BLD AUTO: 3.87 M/UL (ref 4–5.4)
SODIUM SERPL-SCNC: 140 MMOL/L (ref 136–145)
TACROLIMUS BLD-MCNC: 10.9 NG/ML (ref 5–15)
WBC # BLD AUTO: 7.4 K/UL (ref 3.9–12.7)

## 2024-06-18 PROCEDURE — 80197 ASSAY OF TACROLIMUS: CPT | Mod: HCNC | Performed by: INTERNAL MEDICINE

## 2024-06-18 PROCEDURE — 80069 RENAL FUNCTION PANEL: CPT | Mod: HCNC | Performed by: INTERNAL MEDICINE

## 2024-06-18 PROCEDURE — 83735 ASSAY OF MAGNESIUM: CPT | Mod: HCNC | Performed by: INTERNAL MEDICINE

## 2024-06-18 PROCEDURE — 36415 COLL VENOUS BLD VENIPUNCTURE: CPT | Mod: HCNC | Performed by: INTERNAL MEDICINE

## 2024-06-18 PROCEDURE — 85025 COMPLETE CBC W/AUTO DIFF WBC: CPT | Mod: HCNC | Performed by: INTERNAL MEDICINE

## 2024-06-18 NOTE — PROGRESS NOTES
Kidney Post-Transplant Assessment    Referring Physician: Fred Kelly  Current Nephrologist: Fred Kelly    ORGAN: LEFT KIDNEY  Donor Type: living  PHS Increased Risk: no  Cold Ischemia: 60 mins  Induction Medications:     Subjective:     CC:  Reassessment of renal allograft function and management of chronic immunosuppression.    HPI:  Ms. Quezada is a 64 y.o. year old Black or  female with history of ESRD secondary to DM2 who received a living kidney transplant on 5/23/24. She has a history of CVA in 2017 without any deficit. She also has factor XII deficiency, history of hep C (s/p treatment with harvoni), and pHTN (RHC performed on 6/7/23 and found to have group 2 HTN. TTE and cardiac PET with preserved EF).  Her most recent creatinine is 1.4. She takes mycophenolate mofetil, prednisone, and tacrolimus for maintenance immunosuppression. Her post transplant course has been uncomplicated to date.    Due to factor XII deficiency she received 1 unit of FFP in OR and 1 unit of FFP on POD#1. She received standard DVT prophylaxis while inpatient and was transitioned to prophylaxis lovenox x 2 weeks on discharge.     Feels great without complaints. Drinking 6 bottles of water daily, no problems with urination. BP at goal, no peripheral edema. Staples were removed today. Appetite good, weight stable. Tolerating IS without issues, no diarrhea or vomiting.     Patient reports currently with limited food and cannot get additional groceries until July 1st. Currently her cable is cut off and and had extension for electricity. 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 Post transplant she was unable to cook and will likely be limited for the next few weeks. She states family is unable to help support her financially.     Current Outpatient Medications   Medication Sig Dispense Refill    atovaquone (MEPRON) 750 mg/5 mL Susp oral liquid Take 10 mLs (1,500 mg total) by mouth once daily. Stop  "11/19/24 300 mL 5    blood sugar diagnostic Strp 1 each by Misc.(Non-Drug; Combo Route) route 3 (three) times daily. 100 each 2    blood-glucose meter Misc Use as instructed 1 each 0    calcium carbonate (TUMS) 200 mg calcium (500 mg) chewable tablet CHEW 2 tablets (1,000 mg total) by mouth 2 (two) times daily. 120 tablet 11    carvediloL (COREG) 6.25 MG tablet Take 1 tablet (6.25 mg total) by mouth 2 (two) times daily. 180 tablet 3    cholecalciferol, vitamin D3, 125 mcg (5,000 unit) Tab Take 1 tablet (5,000 Units total) by mouth once daily. 90 tablet 3    EScitalopram oxalate (LEXAPRO) 10 MG tablet Take 1 tablet (10 mg total) by mouth every evening. 90 tablet 3    ezetimibe (ZETIA) 10 mg tablet Take 1 tablet (10 mg total) by mouth once daily. 90 tablet 3    famotidine (PEPCID) 20 MG tablet Take 1 tablet (20 mg total) by mouth every evening. 30 tablet 0    insulin aspart U-100 (NOVOLOG) 100 unit/mL (3 mL) InPn pen Inject 3 Units into the skin 3 (three) times daily. + SSI (TDD: 24) 6 mL 0    lancets Misc 1 each by Misc.(Non-Drug; Combo Route) route 3 (three) times daily. 100 each 2    levocetirizine (XYZAL) 5 MG tablet Take 1 tablet (5 mg total) by mouth daily as needed for Allergies. 90 tablet 3    multivitamin Tab Take 1 tablet by mouth once daily.      mycophenolate (CELLCEPT) 250 mg Cap Take 4 capsules (1,000 mg total) by mouth 2 (two) times daily. 240 capsule 11    NIFEdipine (PROCARDIA-XL) 30 MG (OSM) 24 hr tablet Take 1 tablet (30 mg total) by mouth once daily. 30 tablet 5    pen needle, diabetic 32 gauge x 5/32" Ndle 1 each by Misc.(Non-Drug; Combo Route) route 3 (three) times daily. 100 each 0    predniSONE (DELTASONE) 5 MG tablet Take by mouth daily; 5/26/2024-6/1/2024: 20 mg, 6/2/2024-6/8/2024: 15 mg; 6/9/2024-6/15/2024: 10 mg; 6/16/2024- forever: 5 mg; do not stop (Patient taking differently: Take 5 mg by mouth once daily. Take by mouth daily; 5/26/2024-6/1/2024: 20 mg, 6/2/2024-6/8/2024: 15 mg; " 6/9/2024-6/15/2024: 10 mg; 6/16/2024- forever: 5 mg; do not stop) 70 tablet 11    SYNTHROID 200 mcg tablet Take 1 tablet (200 mcg total) by mouth before breakfast. 90 tablet 3    traZODone (DESYREL) 50 MG tablet Take 1 to 2 tablets ( mg total) by mouth nightly as needed for Insomnia. 120 tablet 3    valGANciclovir (VALCYTE) 450 mg Tab Take 2 tablets (900 mg total) by mouth every morning. Stop 8/21/24 60 tablet 2    oxyCODONE (ROXICODONE) 5 MG immediate release tablet Take 1 tablet (5 mg total) by mouth every 6 (six) hours as needed for Pain. (Patient not taking: Reported on 6/19/2024) 28 tablet 0    tacrolimus (PROGRAF) 1 MG Cap Take 5 capsules (5 mg total) by mouth every morning AND  4 capsules (4  mg total) every evening. 300 capsule 11     No current facility-administered medications for this visit.       Past Medical History:   Diagnosis Date    Acute respiratory failure with hypoxia and hypercapnia 05/26/2019    ATN (acute tubular necrosis) 02/08/2019    Chronic diastolic heart failure 02/07/2019 2-8-19 Mild left atrial enlargement. Mild left ventricular enlargement. Normal left ventricular systolic function. The estimated ejection fraction is 55% Indeterminate left ventricular diastolic function. Normal right ventricular systolic function. Mild mitral regurgitation. Mild to moderate tricuspid regurgitation. The estimated PA systolic pressure is 32 mm Hg Normal central venous pressure (3 m    CKD (chronic kidney disease) stage 4, GFR 15-29 ml/min 05/16/2018    CKD stage G3a/A3, GFR 45-59 and albumin creatinine ratio >300 mg/g 05/16/2018    CKD stage G3b/A3, GFR 30-44 and albumin creatinine ratio >300 mg/g 05/16/2018    Closed compression fracture of L2 lumbar vertebra 05/24/2019    Closed compression fracture of L2 lumbar vertebra 05/24/2019     IMO Regulatory Load October 2019    Clotted dialysis access 07/22/2023    Controlled type 2 diabetes with retinopathy 04/09/2018    COVID-19 virus infection  7-1-2021 07/01/2021    CVA (cerebral vascular accident) 2017    Diabetic polyneuropathy associated with type 2 diabetes mellitus 12/04/2020    Encounter for blood transfusion     Essential hypertension 04/09/2018    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  7-31-19 Factor XI Activity 55 - 145 % 96       Gastroesophageal reflux disease without esophagitis 05/24/2019    Generalized anxiety disorder 11/08/2019    GERD (gastroesophageal reflux disease)     GIB (gastrointestinal bleeding) 10/18/2018    History of CVA (cerebrovascular accident) 11/27/2022    History of CVA (cerebrovascular accident) without residual deficits 11/27/2022    History of hepatitis C     s/p succesful Rx w/ Harvoni  - SVR12 (cure) 2016    Hypertensive retinopathy, bilateral 12/10/2019    Mild nonproliferative diabetic retinopathy 05/02/2018    Mixed hyperlipidemia 04/09/2018    Myalgia due to statin 09/07/2022    Normocytic anemia 01/14/2022    NS (nuclear sclerosis), bilateral 12/10/2019    Peanut allergy 02/04/2023    PNA (pneumonia) 05/28/2019    Postoperative hypothyroidism     Prophylactic immunotherapy 05/24/2024    Pulmonary hypertension 02/06/2023    Renovascular hypertension 04/09/2018    S/P kidney transplant 05/24/2024    S/P kyphoplasty 08/14/2019 8/14/19: L2 kyphoplasty with Dr. Kwon     Slow transit constipation 05/27/2019    Type 2 diabetes mellitus with circulatory disorder, with long-term current use of insulin 04/09/2018    Type 2 diabetes mellitus with stage 3 chronic kidney disease, without long-term current use of insulin 04/09/2018    Type 2 diabetes mellitus with stage 3a chronic kidney disease, without long-term current use of insulin 03/03/2021    Type 2 diabetes mellitus with stage 3b chronic kidney disease, without long-term current use of insulin 09/07/2022       Review of Systems   Constitutional:  Negative for activity change and fever.   Eyes:  Positive for visual disturbance.        Wears  "glasses   Respiratory:  Negative for shortness of breath.    Cardiovascular:  Negative for chest pain and leg swelling.   Gastrointestinal:  Negative for constipation, diarrhea and nausea.   Genitourinary:  Negative for difficulty urinating, frequency and hematuria.   Musculoskeletal:  Negative for arthralgias and myalgias.   Skin:  Negative for wound.   Allergic/Immunologic: Positive for immunocompromised state.   Neurological:  Negative for weakness and numbness.   Psychiatric/Behavioral:  Negative for sleep disturbance. The patient is not nervous/anxious.        Objective:   Blood pressure 133/87, pulse 90, temperature 97.7 °F (36.5 °C), temperature source Tympanic, resp. rate 18, height 5' 9" (1.753 m), weight 104 kg (229 lb 4.5 oz), SpO2 98%.body mass index is 33.86 kg/m².    Physical Exam  Vitals and nursing note reviewed.   Constitutional:       Appearance: Normal appearance.   Cardiovascular:      Rate and Rhythm: Normal rate and regular rhythm.      Heart sounds: Normal heart sounds.   Pulmonary:      Effort: Pulmonary effort is normal.      Breath sounds: Normal breath sounds.   Abdominal:      General: There is no distension.   Musculoskeletal:         General: Normal range of motion.   Skin:     General: Skin is warm and dry.   Neurological:      Mental Status: She is alert.         Labs:  Lab Results   Component Value Date    WBC 7.40 06/18/2024    HGB 10.5 (L) 06/18/2024    HCT 33.3 (L) 06/18/2024     06/18/2024    K 4.7 06/18/2024     06/18/2024    CO2 28 06/18/2024    BUN 13 06/18/2024    CREATININE 1.3 06/18/2024    EGFRNORACEVR 46.5 (A) 06/18/2024    CALCIUM 9.4 06/18/2024    PHOS 3.2 06/18/2024    MG 1.6 06/18/2024    ALBUMIN 3.6 06/18/2024    AST 17 05/02/2024    ALT 11 05/02/2024    UTPCR 0.11 06/06/2024    .7 (H) 08/10/2023    TACROLIMUS 10.9 06/18/2024       Labs were reviewed with the patient    Assessment:     1. S/P kidney transplant    2. Renovascular hypertension    3. " Mixed hyperlipidemia    4. Factor XI deficiency    5. Controlled type 2 diabetes mellitus with stable proliferative retinopathy of both eyes, unspecified whether long term insulin use    6. Class 2 severe obesity due to excess calories with serious comorbidity and body mass index (BMI) of 35.0 to 35.9 in adult    7. Secondary hyperparathyroidism of renal origin    8. Long-term use of immunosuppressant medication      Plan:   Routine follow-up  Notified  of social issues and will reach out to discuss.    1. Immunosuppression: Prograf trough 10.9, which is SUPRAtherapeutic. Continue Prograf 5/4, MMF 1000 mg BID, and Prednisone taper. Will continue to monitor for drug toxicities    2. Allograft Function: Stable. Continue good oral hydration.   - ESRD secondary to DM2 s/p living kidney transplant on 5/23/24.   - post transplant course has been uncomplicated to date.  Lab Results   Component Value Date    CREATININE 1.3 06/18/2024      Latest Reference Range & Units POD 26,  Kidney-Post 1 Year  06/18/24 08:40   Creatinine 0.5 - 1.4 mg/dL 1.3   eGFR >60 mL/min/1.73 m^2 46.5 !   Glucose 70 - 110 mg/dL 188 (H)     Lab Results   Component Value Date    HGBA1C 5.5 05/23/2024         3. Hypertension management: advise low salt diet and home BP monitoring      4. Metabolic Bone Disease/Secondary Hyperparathyroidism:  Lab Results   Component Value Date    .7 (H) 08/10/2023    CALCIUM 9.4 06/18/2024    PHOS 3.2 06/18/2024      Latest Reference Range & Units POD 26,  Kidney-Post 1 Year  06/18/24 08:40   Magnesium  1.6 - 2.6 mg/dL 1.6       5. Electrolytes: stable. No need for intervention   Lab Results   Component Value Date     06/18/2024    K 4.7 06/18/2024     06/18/2024    CO2 28 06/18/2024       6. Anemia: stable. No need for intervention   Lab Results   Component Value Date    WBC 7.40 06/18/2024    HGB 10.5 (L) 06/18/2024    HCT 33.3 (L) 06/18/2024    MCV 86 06/18/2024     06/18/2024          7. Proteinuria: continue p/c ratio as per guidelines   [facility] [place]/3/2024: 0.12    8. BK virus infection screening:  BK PCR per protocol    Latest Reference Range & Units POD 21,  Kidney-Post 1 Year  06/13/24 07:10   BK Virus DNA, Blood Not detected  Not detected   BK Virus DNA PCR, Quant, Blood <125 Copies/mL <125       9. Weight education: provided during the clinic visit   Body mass index is 33.86 kg/m².     10. Patient safety education regarding immunosuppression including prophylaxis posttransplant for CMV, PCP    PCP ppx: Atovaquone until 11/19/24  CMV ppx: Valcyte until 8/21/24     Follow-up:   Clinic: return to transplant clinic weekly for the first month after transplant; every 2 weeks during months 2-3; then at 6-, 9-, 12-, 18-, 24-, and 36- months post-transplant to reassess for complications from immunosuppression toxicity and monitor for rejection.  Annually thereafter.    Labs: since patient remains at high risk for rejection and drug-related complications that warrant close monitoring, labs will be ordered as follows: continue twice weekly CBC, renal panel, and drug level for first month; then same labs once weekly through 3rd month post-transplant.  Urine for UA and protein/creatinine ratio monthly.  Serum BK - PCR at 1-, 3-, 6-, 9-, 12-, 18-, 24-, 36-, 48-, and 60 months post-transplant.  Hepatic panel at 1-, 2-, 3-, 6-, 9-, 12-, 18-, 24-, and 36- months post-transplant.    Maria Del Carmen Holland NP       Education:   Material provided to the patient.  Patient reminded to call with any health changes since these can be early signs of significant complications.  Also, I advised the patient to be sure any new medications or changes of old medications are discussed with either a pharmacist or physician knowledgeable with transplant to avoid rejection/drug toxicity related to significant drug interactions.    Patient advised that it is recommended that all transplanted patients, and their close contacts and  household members receive Covid vaccination.

## 2024-06-19 ENCOUNTER — OFFICE VISIT (OUTPATIENT)
Dept: TRANSPLANT | Facility: CLINIC | Age: 64
End: 2024-06-19
Payer: MEDICARE

## 2024-06-19 ENCOUNTER — TELEPHONE (OUTPATIENT)
Dept: TRANSPLANT | Facility: CLINIC | Age: 64
End: 2024-06-19

## 2024-06-19 VITALS
WEIGHT: 229.25 LBS | BODY MASS INDEX: 33.96 KG/M2 | OXYGEN SATURATION: 98 % | HEART RATE: 90 BPM | DIASTOLIC BLOOD PRESSURE: 87 MMHG | HEIGHT: 69 IN | HEIGHT: 69 IN | BODY MASS INDEX: 33.96 KG/M2 | TEMPERATURE: 98 F | SYSTOLIC BLOOD PRESSURE: 133 MMHG | OXYGEN SATURATION: 98 % | RESPIRATION RATE: 18 BRPM | TEMPERATURE: 98 F | HEART RATE: 90 BPM | RESPIRATION RATE: 18 BRPM | WEIGHT: 229.25 LBS | SYSTOLIC BLOOD PRESSURE: 133 MMHG | DIASTOLIC BLOOD PRESSURE: 87 MMHG

## 2024-06-19 DIAGNOSIS — E66.01 CLASS 2 SEVERE OBESITY DUE TO EXCESS CALORIES WITH SERIOUS COMORBIDITY AND BODY MASS INDEX (BMI) OF 35.0 TO 35.9 IN ADULT: ICD-10-CM

## 2024-06-19 DIAGNOSIS — E78.2 MIXED HYPERLIPIDEMIA: ICD-10-CM

## 2024-06-19 DIAGNOSIS — Z94.0 KIDNEY REPLACED BY TRANSPLANT: ICD-10-CM

## 2024-06-19 DIAGNOSIS — Z79.60 LONG-TERM USE OF IMMUNOSUPPRESSANT MEDICATION: ICD-10-CM

## 2024-06-19 DIAGNOSIS — Z79.899 ENCOUNTER FOR LONG-TERM (CURRENT) USE OF OTHER MEDICATIONS: ICD-10-CM

## 2024-06-19 DIAGNOSIS — Z94.0 S/P KIDNEY TRANSPLANT: ICD-10-CM

## 2024-06-19 DIAGNOSIS — D68.1 FACTOR XI DEFICIENCY: Chronic | ICD-10-CM

## 2024-06-19 DIAGNOSIS — N25.81 SECONDARY HYPERPARATHYROIDISM OF RENAL ORIGIN: ICD-10-CM

## 2024-06-19 DIAGNOSIS — I15.0 RENOVASCULAR HYPERTENSION: Chronic | ICD-10-CM

## 2024-06-19 DIAGNOSIS — Z51.81 ENCOUNTER FOR THERAPEUTIC DRUG MONITORING: ICD-10-CM

## 2024-06-19 DIAGNOSIS — E11.3553 CONTROLLED TYPE 2 DIABETES MELLITUS WITH STABLE PROLIFERATIVE RETINOPATHY OF BOTH EYES, UNSPECIFIED WHETHER LONG TERM INSULIN USE: Chronic | ICD-10-CM

## 2024-06-19 DIAGNOSIS — Z29.89 PROPHYLACTIC IMMUNOTHERAPY: Primary | ICD-10-CM

## 2024-06-19 DIAGNOSIS — Z94.0 S/P KIDNEY TRANSPLANT: Primary | ICD-10-CM

## 2024-06-19 PROCEDURE — 3066F NEPHROPATHY DOC TX: CPT | Mod: HCNC,CPTII,S$GLB, | Performed by: TRANSPLANT SURGERY

## 2024-06-19 PROCEDURE — 4010F ACE/ARB THERAPY RXD/TAKEN: CPT | Mod: HCNC,CPTII,S$GLB, | Performed by: NURSE PRACTITIONER

## 2024-06-19 PROCEDURE — 3008F BODY MASS INDEX DOCD: CPT | Mod: HCNC,CPTII,S$GLB, | Performed by: TRANSPLANT SURGERY

## 2024-06-19 PROCEDURE — 1111F DSCHRG MED/CURRENT MED MERGE: CPT | Mod: HCNC,CPTII,S$GLB, | Performed by: TRANSPLANT SURGERY

## 2024-06-19 PROCEDURE — 99999 PR PBB SHADOW E&M-EST. PATIENT-LVL III: CPT | Mod: PBBFAC,HCNC,,

## 2024-06-19 PROCEDURE — 1159F MED LIST DOCD IN RCRD: CPT | Mod: HCNC,CPTII,S$GLB, | Performed by: TRANSPLANT SURGERY

## 2024-06-19 PROCEDURE — 3075F SYST BP GE 130 - 139MM HG: CPT | Mod: HCNC,CPTII,S$GLB, | Performed by: NURSE PRACTITIONER

## 2024-06-19 PROCEDURE — 3044F HG A1C LEVEL LT 7.0%: CPT | Mod: HCNC,CPTII,S$GLB, | Performed by: TRANSPLANT SURGERY

## 2024-06-19 PROCEDURE — 1160F RVW MEDS BY RX/DR IN RCRD: CPT | Mod: HCNC,CPTII,S$GLB, | Performed by: TRANSPLANT SURGERY

## 2024-06-19 PROCEDURE — 99215 OFFICE O/P EST HI 40 MIN: CPT | Mod: HCNC,S$GLB,, | Performed by: NURSE PRACTITIONER

## 2024-06-19 PROCEDURE — 99215 OFFICE O/P EST HI 40 MIN: CPT | Mod: 24,HCNC,S$GLB, | Performed by: TRANSPLANT SURGERY

## 2024-06-19 PROCEDURE — 3079F DIAST BP 80-89 MM HG: CPT | Mod: HCNC,CPTII,S$GLB, | Performed by: NURSE PRACTITIONER

## 2024-06-19 PROCEDURE — 3079F DIAST BP 80-89 MM HG: CPT | Mod: HCNC,CPTII,S$GLB, | Performed by: TRANSPLANT SURGERY

## 2024-06-19 PROCEDURE — 3044F HG A1C LEVEL LT 7.0%: CPT | Mod: HCNC,CPTII,S$GLB, | Performed by: NURSE PRACTITIONER

## 2024-06-19 PROCEDURE — 3075F SYST BP GE 130 - 139MM HG: CPT | Mod: HCNC,CPTII,S$GLB, | Performed by: TRANSPLANT SURGERY

## 2024-06-19 PROCEDURE — 3066F NEPHROPATHY DOC TX: CPT | Mod: HCNC,CPTII,S$GLB, | Performed by: NURSE PRACTITIONER

## 2024-06-19 PROCEDURE — 4010F ACE/ARB THERAPY RXD/TAKEN: CPT | Mod: HCNC,CPTII,S$GLB, | Performed by: TRANSPLANT SURGERY

## 2024-06-19 PROCEDURE — 99999 PR PBB SHADOW E&M-EST. PATIENT-LVL IV: CPT | Mod: PBBFAC,HCNC,, | Performed by: NURSE PRACTITIONER

## 2024-06-19 PROCEDURE — 3008F BODY MASS INDEX DOCD: CPT | Mod: HCNC,CPTII,S$GLB, | Performed by: NURSE PRACTITIONER

## 2024-06-19 PROCEDURE — 1111F DSCHRG MED/CURRENT MED MERGE: CPT | Mod: HCNC,CPTII,S$GLB, | Performed by: NURSE PRACTITIONER

## 2024-06-19 RX ORDER — TACROLIMUS 1 MG/1
CAPSULE ORAL
Qty: 300 CAPSULE | Refills: 11 | Status: SHIPPED | OUTPATIENT
Start: 2024-06-19 | End: 2024-06-19

## 2024-06-19 RX ORDER — TACROLIMUS 1 MG/1
CAPSULE ORAL
Qty: 300 CAPSULE | Refills: 11 | Status: SHIPPED | OUTPATIENT
Start: 2024-06-19 | End: 2024-06-21

## 2024-06-19 NOTE — TELEPHONE ENCOUNTER
[place] [facility]     Adjust FK. ----- Message from Leticia Lares DO sent at 6/19/2024 12:15 PM CDT -----  Near baseline graft function. Acceptable electrolytes  Stable Hgb  High FK. Decrease Fk to 5 mg BID from 6/5

## 2024-06-19 NOTE — TELEPHONE ENCOUNTER
Spoke to pt who is coming to clinic.     ----- Message from Leticia Lares DO sent at 6/19/2024 12:15 PM CDT -----  Near baseline graft function. Acceptable electrolytes  Stable Hgb  High FK. Decrease Fk to 5 mg BID from 6/5

## 2024-06-19 NOTE — TELEPHONE ENCOUNTER
----- Message from Leticia Lares DO sent at 6/19/2024 12:15 PM CDT -----  Near baseline graft function. Acceptable electrolytes  Stable Hgb  High FK. Decrease Fk to 5 mg BID from 6/5

## 2024-06-19 NOTE — LETTER
June 19, 2024        Fred Kelly  [address]  [facility] [address]  Phone: [phone]  Fax: [phone]             [facility]st [facility]  [address]  [facility] [address]  Phone: [phone]   Patient: Kathie Quezada   MR Number: [id number]   YOB: 1960   Date of Visit: 6/19/2024       Dear Dr. Fred Kelly    Thank you for referring Kathie Quezada to me for evaluation. Attached you will find relevant portions of my assessment and plan of care.    If you have questions, please do not hesitate to call me. I look forward to following Kathie Quezada along with you.    Sincerely,    Maria Del Carmen Holland NP    Enclosure    If you would like to receive this communication electronically, please contact [email or web address] or [phone] to request [employer] access.    [employer] is a tool which provides read-only access to select patient information with whom you have a relationship. Its easy to use and provides real time access to review your patients record including encounter summaries, notes, results, and demographic information.    If you feel you have received this communication in error or would no longer like to receive these types of communications, please e-mail [email or web address]

## 2024-06-19 NOTE — TELEPHONE ENCOUNTER
received message that patient would like to speak with a transplant  regarding food and utility resources.  spoke with patient over the phone. Patient reports receiving SNAP and assistance from [employer]. Patient also reports her family assists her as needed. Discussed food bank resources with patient, patient reports she would like a list of local food [place]. Patient reports she would also like information on utility assistance. Patient reports she will reach out to [employer] to discuss what other benefits/assistance she may qualify for. Patient gave this  permission to mail her resources to her home address at:     [address] [place] [address]   [address] [place] [address]       No other needs reported at this time.       Resources sent via mail on 6/20/24.       Parisa Calloway LMSW

## 2024-06-19 NOTE — PROGRESS NOTES
Transplant Surgery  Kidney Transplant Recipient Follow-up    Referring Physician: Fred Kelly  Current Nephrologist: Fred Kelly    Subjective:     Chief Complaint: Kathie Quezada is a 64 y.o. year old Black or  female who is status post Kidney transplant performed on 5/23/2024.    ORGAN: LEFT KIDNEY    Disease Etiology: Diabetes Mellitus - Type II  Donor Type: Living    Donor CMV Status:      Donor HBcAB:      Donor HCV Status:        History of Present Illness: She reports no concerns.  From a transplant perspective, she reports normal urination.  Kathie is here for management of her immunosuppression medication.  Kathie states that her immunosuppression is being well tolerated.  Hypertension is not present.    External provider notes reviewed: Yes    Review of Systems   Constitutional:  Negative for activity change, appetite change, chills, fatigue and fever.   HENT:  Negative for sore throat and trouble swallowing.    Eyes:  Negative for visual disturbance.   Respiratory:  Negative for cough, chest tightness and shortness of breath.    Cardiovascular:  Negative for chest pain, palpitations and leg swelling.   Gastrointestinal:  Negative for abdominal distention, abdominal pain, blood in stool, constipation, diarrhea, nausea and vomiting.   Endocrine: Negative for polyuria.   Genitourinary:  Negative for decreased urine volume, difficulty urinating, dysuria, flank pain, frequency and hematuria.   Musculoskeletal:  Negative for gait problem, myalgias and neck stiffness.   Skin:  Negative for rash and wound.   Neurological:  Negative for dizziness, tremors, seizures, weakness, light-headedness and headaches.   Hematological:  Negative for adenopathy.   Psychiatric/Behavioral:  Negative for agitation, confusion and sleep disturbance.        Objective:     Physical Exam  Vitals reviewed.   Constitutional:       General: She is not in acute distress.     Appearance: She is  well-developed.   HENT:      Head: Normocephalic.   Eyes:      General: No scleral icterus.     Conjunctiva/sclera: Conjunctivae normal.      Pupils: Pupils are equal, round, and reactive to light.   Neck:      Thyroid: No thyromegaly.      Trachea: No tracheal deviation.   Cardiovascular:      Rate and Rhythm: Normal rate and regular rhythm.      Heart sounds: Normal heart sounds. No murmur heard.  Pulmonary:      Effort: Pulmonary effort is normal. No respiratory distress.      Breath sounds: Normal breath sounds.   Abdominal:      General: Bowel sounds are normal. There is no distension.      Palpations: Abdomen is soft. There is no mass.      Tenderness: There is no abdominal tenderness. There is no guarding or rebound.      Comments: No inguinal adenopathy   Musculoskeletal:         General: Normal range of motion.      Cervical back: Normal range of motion and neck supple.      Comments: No clubbing or cyanosis   Lymphadenopathy:      Cervical: No cervical adenopathy.   Skin:     General: Skin is warm and dry.      Findings: No erythema or rash.   Neurological:      Mental Status: She is alert and oriented to person, place, and time.   Psychiatric:         Behavior: Behavior normal.       Lab Results   Component Value Date    CREATININE 1.3 06/18/2024    BUN 13 06/18/2024     Lab Results   Component Value Date    WBC 7.40 06/18/2024    HGB 10.5 (L) 06/18/2024    HCT 33.3 (L) 06/18/2024    HCT 38 05/23/2024     06/18/2024     Lab Results   Component Value Date    TACROLIMUS 10.9 06/18/2024       Diagnostics:  The following labs have been reviewed: CBC  BMP  UA  TACROLIMUS LEVEL  The following radiology images have been independently reviewed and interpreted: Renal US    Assessment and Plan:        S/P Kidney transplant.  Chronic immunosuppressive medications for rejection prophylaxis at target.  Plan: no adjustment needed.  Continue monitoring symptoms, labs and drug levels for drug-related toxicity and  side effects.  Renal hypertension at target.  Remove staples    Additional testing to be completed according to the Kidney: Written Order Guideline for Kidney Transplant Follow-Up (KI-09)    Interpretation of tests and discussion of patient management involves all members of the multidisciplinary transplant team.  Patient advised that it is recommended that all transplant candidates, and their close contacts and household members receive Covid vaccination.  Follow-up: Patient reminded to call with any health changes, since these can be early signs of significant complications.  Also, I advised the patient to be sure any new medications or changes of old medications are discussed with either a pharmacist, or physician knowledgeable with transplant to avoid rejection/drug toxicity related to significant drug interactions.    MD HARIKA Smith Patient Status  Functional Status: 60% - Requires occasional assistance but is able to care for needs  Physical Capacity: No Limitations

## 2024-06-21 DIAGNOSIS — Z94.0 S/P KIDNEY TRANSPLANT: ICD-10-CM

## 2024-06-21 RX ORDER — TACROLIMUS 1 MG/1
CAPSULE ORAL
Qty: 210 CAPSULE | Refills: 11 | Status: SHIPPED | OUTPATIENT
Start: 2024-06-22

## 2024-06-21 NOTE — TELEPHONE ENCOUNTER
Received written order from Dr. Lares.  Spoke to patient and instructed to hold tacro tonight and restart tomorrow morning at 4 mg in the morning and 3 mg in the evening. Patient verbalized understanding.       ----- Message from Leticia Lares DO sent at 6/21/2024 12:32 PM CDT -----  High FK. Hold FK dose tonight and decrease FK to 4/3 from 5/4

## 2024-06-25 ENCOUNTER — OFFICE VISIT (OUTPATIENT)
Dept: INTERNAL MEDICINE | Facility: CLINIC | Age: 64
End: 2024-06-25
Payer: MEDICARE

## 2024-06-25 DIAGNOSIS — E78.2 MIXED HYPERLIPIDEMIA: ICD-10-CM

## 2024-06-25 DIAGNOSIS — I50.32 CHRONIC DIASTOLIC HEART FAILURE: Chronic | ICD-10-CM

## 2024-06-25 DIAGNOSIS — Z94.0 S/P KIDNEY TRANSPLANT: Primary | ICD-10-CM

## 2024-06-25 DIAGNOSIS — E89.0 POSTOPERATIVE HYPOTHYROIDISM: Chronic | ICD-10-CM

## 2024-06-25 DIAGNOSIS — I15.0 RENOVASCULAR HYPERTENSION: Chronic | ICD-10-CM

## 2024-06-25 DIAGNOSIS — D63.8 ANEMIA OF CHRONIC DISEASE: ICD-10-CM

## 2024-06-25 DIAGNOSIS — F41.1 GENERALIZED ANXIETY DISORDER: Chronic | ICD-10-CM

## 2024-06-25 DIAGNOSIS — Z79.60 LONG-TERM USE OF IMMUNOSUPPRESSANT MEDICATION: ICD-10-CM

## 2024-06-25 DIAGNOSIS — D68.1 FACTOR XI DEFICIENCY: Chronic | ICD-10-CM

## 2024-06-25 DIAGNOSIS — I27.20 PULMONARY HYPERTENSION: ICD-10-CM

## 2024-06-25 DIAGNOSIS — M89.8X9 METABOLIC BONE DISEASE: ICD-10-CM

## 2024-06-25 DIAGNOSIS — E55.9 VITAMIN D DEFICIENCY: ICD-10-CM

## 2024-06-25 DIAGNOSIS — Z29.89 PROPHYLACTIC IMMUNOTHERAPY: ICD-10-CM

## 2024-06-25 DIAGNOSIS — Z94.0 S/P KIDNEY TRANSPLANT: ICD-10-CM

## 2024-06-25 DIAGNOSIS — E11.42 DIABETIC POLYNEUROPATHY ASSOCIATED WITH TYPE 2 DIABETES MELLITUS: Chronic | ICD-10-CM

## 2024-06-25 DIAGNOSIS — E66.01 CLASS 2 SEVERE OBESITY DUE TO EXCESS CALORIES WITH SERIOUS COMORBIDITY AND BODY MASS INDEX (BMI) OF 35.0 TO 35.9 IN ADULT: ICD-10-CM

## 2024-06-25 DIAGNOSIS — M79.10 MYALGIA DUE TO STATIN: ICD-10-CM

## 2024-06-25 DIAGNOSIS — N25.81 SECONDARY HYPERPARATHYROIDISM OF RENAL ORIGIN: ICD-10-CM

## 2024-06-25 DIAGNOSIS — E11.3553 CONTROLLED TYPE 2 DIABETES MELLITUS WITH STABLE PROLIFERATIVE RETINOPATHY OF BOTH EYES, UNSPECIFIED WHETHER LONG TERM INSULIN USE: Chronic | ICD-10-CM

## 2024-06-25 DIAGNOSIS — T46.6X5A MYALGIA DUE TO STATIN: ICD-10-CM

## 2024-06-25 PROBLEM — R80.9 NEPHROTIC RANGE PROTEINURIA: Status: RESOLVED | Noted: 2018-04-09 | Resolved: 2024-06-25

## 2024-06-25 PROBLEM — Z98.890 S/P ARTERIOVENOUS (AV) FISTULA CREATION: Status: RESOLVED | Noted: 2019-08-14 | Resolved: 2024-06-25

## 2024-06-25 PROBLEM — D62 ACUTE BLOOD LOSS ANEMIA: Status: RESOLVED | Noted: 2024-05-24 | Resolved: 2024-06-25

## 2024-06-25 RX ORDER — TACROLIMUS 1 MG/1
CAPSULE ORAL
Qty: 210 CAPSULE | Refills: 11 | Status: SHIPPED | OUTPATIENT
Start: 2024-06-25

## 2024-06-25 NOTE — PROGRESS NOTES
INTERNAL MEDICINE CLINIC  TELEMEDICINE ENCOUNTER  Progress Note     PRESENTING HISTORY     PCP: Elan Price MD    Last Clinic Visit with me:  2-2-2024    Current Chief Complaint/Problem:  s/p kidney transplant.    The patient location is: Home  Visit type: Virtual visit with synchronous audio and video    History of Present Illness & ROS: Ms. Kathie Quezada is a 64 y.o. female.    Her daughter donated kidney for transplant.     Answers submitted by the patient for this visit:  Review of Systems Questionnaire (Submitted on 6/24/2024)  activity change: Yes  unexpected weight change: No  neck pain: No  hearing loss: No  rhinorrhea: Yes  trouble swallowing: No  eye discharge: No  visual disturbance: Yes  chest tightness: No  wheezing: No  chest pain: No  palpitations: No  blood in stool: No  constipation: No  vomiting: No  diarrhea: No  polydipsia: No  polyuria: No  difficulty urinating: No  hematuria: No  menstrual problem: No  dysuria: No  joint swelling: No  arthralgias: Yes  headaches: No  weakness: Yes  confusion: Yes  dysphoric mood: No    PAST HISTORY:     Past Medical History:   Diagnosis Date    Acute respiratory failure with hypoxia and hypercapnia 05/26/2019    ATN (acute tubular necrosis) 02/08/2019    Chronic diastolic heart failure 02/07/2019    2-8-19 Mild left atrial enlargement. Mild left ventricular enlargement. Normal left ventricular systolic function. The estimated ejection fraction is 55% Indeterminate left ventricular diastolic function. Normal right ventricular systolic function. Mild mitral regurgitation. Mild to moderate tricuspid regurgitation. The estimated PA systolic pressure is 32 mm Hg Normal central venous pressure (3 m    CKD (chronic kidney disease) stage 4, GFR 15-29 ml/min 05/16/2018    CKD stage G3a/A3, GFR 45-59 and albumin creatinine ratio >300 mg/g 05/16/2018    CKD stage G3b/A3, GFR 30-44 and albumin creatinine ratio >300 mg/g 05/16/2018    Closed compression  fracture of L2 lumbar vertebra 05/24/2019    Closed compression fracture of L2 lumbar vertebra 05/24/2019     IMO Regulatory Load October 2019    Clotted dialysis access 07/22/2023    Controlled type 2 diabetes with retinopathy 04/09/2018    COVID-19 virus infection 7-1-2021 07/01/2021    CVA (cerebral vascular accident) 2017    Diabetic polyneuropathy associated with type 2 diabetes mellitus 12/04/2020    Encounter for blood transfusion     Essential hypertension 04/09/2018    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  7-31-19 Factor XI Activity 55 - 145 % 96       Gastroesophageal reflux disease without esophagitis 05/24/2019    Generalized anxiety disorder 11/08/2019    GERD (gastroesophageal reflux disease)     GIB (gastrointestinal bleeding) 10/18/2018    History of CVA (cerebrovascular accident) 11/27/2022    History of CVA (cerebrovascular accident) without residual deficits 11/27/2022    History of hepatitis C     s/p succesful Rx w/ Harvoni  - SVR12 (cure) 2016    Hypertensive retinopathy, bilateral 12/10/2019    Mild nonproliferative diabetic retinopathy 05/02/2018    Mixed hyperlipidemia 04/09/2018    Myalgia due to statin 09/07/2022    Nephrotic range proteinuria 04/09/2018    Normocytic anemia 01/14/2022    NS (nuclear sclerosis), bilateral 12/10/2019    Peanut allergy 02/04/2023    PNA (pneumonia) 05/28/2019    Postoperative hypothyroidism     Prophylactic immunotherapy 05/24/2024    Pulmonary hypertension 02/06/2023    Renovascular hypertension 04/09/2018    S/P arteriovenous (AV) fistula creation 08/14/2019 8/14/19: L2 kyphoplasty with Dr. Kwon       S/P kidney transplant 05/24/2024    S/P kyphoplasty 08/14/2019 8/14/19: L2 kyphoplasty with Dr. Kwon     Slow transit constipation 05/27/2019    Type 2 diabetes mellitus with circulatory disorder, with long-term current use of insulin 04/09/2018    Type 2 diabetes mellitus with stage 3 chronic kidney disease, without long-term  current use of insulin 04/09/2018    Type 2 diabetes mellitus with stage 3a chronic kidney disease, without long-term current use of insulin 03/03/2021    Type 2 diabetes mellitus with stage 3b chronic kidney disease, without long-term current use of insulin 09/07/2022       Past Surgical History:   Procedure Laterality Date    AV FISTULA PLACEMENT Left 10/27/2022    Procedure: CREATION, AV FISTULA;  Surgeon: Tirso Tavera MD;  [facility];  Service: Peripheral Vascular;  Laterality: Left;  Brachio basilic 1st stage     BACK SURGERY  2017    CHOLECYSTECTOMY  2015    [place]    COLONOSCOPY  11/13/2015    Dr Chino torres prep. internal hemorrhoids. diverticulosis of sigmois. NO polyps. repeat due 2025    FISTULOGRAM N/A 12/16/2022    Procedure: Fistulogram;  Surgeon: Tirso Tavera MD;  Location: [facility] CATH LAB;  Service: Peripheral Vascular;  Laterality: N/A;    FISTULOGRAM, WITH PTA Left 3/23/2023    Procedure: FISTULOGRAM, WITH PTA;  Surgeon: Tirso Tavera MD;  [facility];  Service: Peripheral Vascular;  Laterality: Left;  LUE TRANSRADIAL CO2 FISTULOGRAM    1.9 min  22.99 mGy  4.3032 Gy.cm  200cc CO2  9ml TransRadial Solution    FISTULOGRAM, WITH PTA Left 8/15/2023    Procedure: FISTULOGRAM, WITH PTA TRANSRADIAL;  Surgeon: Tirso Tavera MD;  [facility];  Service: Peripheral Vascular;  Laterality: Left;  Fluoro: 5.1 min, mGy: 7.95, Gycm2: 1.2626, contrast: 12ml    FIXATION KYPHOPLASTY Bilateral 08/14/2019    Procedure: L2 kyphoplasty Fluoro Globus;  Surgeon: Jeremie Kwon DO;  Location: [facility] OR;  Service: Neurosurgery;  Laterality: Bilateral;  EMMYUS MACRINA WINSLOW [phone] TEXTED HER @ 10:54AM ON 7-30-19  PRE-OP-BY RN  8-7-2019    HYSTERECTOMY      KIDNEY TRANSPLANT N/A 5/23/2024    Procedure: TRANSPLANT, KIDNEY;  Surgeon: Stepan Bernardo MD;  [facility];  Service: Transplant;  Laterality: N/A;    OOPHORECTOMY  1996    one    PERCUTANEOUS  TRANSLUMINAL ANGIOPLASTY OF ARTERIOVENOUS FISTULA Left 12/16/2022    Procedure: PTA, AV FISTULA;  Surgeon: Tirso Tavera MD;  Location: [facility] CATH LAB;  Service: Peripheral Vascular;  Laterality: Left;    PERCUTANEOUS TRANSLUMINAL ANGIOPLASTY OF ARTERIOVENOUS FISTULA Left 11/30/2023    Procedure: PTA, AV FISTULA;  Surgeon: Tirso Tavera MD;  Location: [facility] CATH LAB;  Service: Peripheral Vascular;  Laterality: Left;  left transradial fistulagram    RIGHT HEART CATHETERIZATION Right 6/15/2023    Procedure: INSERTION, CATHETER, RIGHT HEART;  Surgeon: Lucretia Rivas MD;  Location: [facility] CATH LAB;  Service: Cardiology;  Laterality: Right;    THYROIDECTOMY  2016    at [facility] for [facility] nodule    TONSILLECTOMY      TRANSPOSITION OF BASILIC VEIN Left 1/31/2023    Procedure: TRANSPOSITION, VEIN, BASILIC;  Surgeon: Tirso Tavera MD;  Location: [facility] OR [address] [place] [address];  Service: Peripheral Vascular;  Laterality: Left;       Family History   Problem Relation Name Age of Onset    Stroke Mother      Heart disease Mother      Hypertension Mother      Diabetes type II Mother      Coronary artery disease Mother      Hypertension Father      Prostate cancer Father      Aneurysm Brother         Social History     Socioeconomic History    Marital status:    Tobacco Use    Smoking status: Never     Passive exposure: Past    Smokeless tobacco: Never   Substance and Sexual Activity    Alcohol use: Not Currently    Drug use: No    Sexual activity: Not Currently   Social History Narrative    Pt enjoys cooking and baking and making Floral arrangement and [employer]     Caregiver Daughter Mike     Social Determinants of Health     Financial Resource Strain: Low Risk  (5/24/2024)    Overall Financial Resource Strain (CARDIA)     Difficulty of Paying Living Expenses: Not very hard   Food Insecurity: No Food Insecurity (5/24/2024)    Hunger Vital Sign     Worried About Running Out of Food in the Last Year: Never true     Ran Out of  Food in the Last Year: Never true   Transportation Needs: No Transportation Needs (5/24/2024)    TRANSPORTATION NEEDS     Transportation : No   Physical Activity: Insufficiently Active (1/24/2024)    Exercise Vital Sign     Days of Exercise per Week: 3 days     Minutes of Exercise per Session: 20 min   Stress: No Stress Concern Present (5/24/2024)    [employer] - Occupational Stress Questionnaire     Feeling of Stress : Not at all   Housing Stability: Low Risk  (5/24/2024)    Housing Stability Vital Sign     Unable to Pay for Housing in the Last Year: No     Homeless in the Last Year: No       MEDICATIONS & ALLERGIES:     Current Outpatient Medications on File Prior to Visit   Medication Sig Dispense Refill    atovaquone (MEPRON) 750 mg/5 mL Susp oral liquid Take 10 mLs (1,500 mg total) by mouth once daily. Stop 11/19/24 300 mL 5    blood sugar diagnostic Strp 1 each by Misc.(Non-Drug; Combo Route) route 3 (three) times daily. 100 each 2    blood-glucose meter Misc Use as instructed 1 each 0    calcium carbonate (TUMS) 200 mg calcium (500 mg) chewable tablet CHEW 2 tablets (1,000 mg total) by mouth 2 (two) times daily. 120 tablet 11    carvediloL (COREG) 6.25 MG tablet Take 1 tablet (6.25 mg total) by mouth 2 (two) times daily. 180 tablet 3    cholecalciferol, vitamin D3, 125 mcg (5,000 unit) Tab Take 1 tablet (5,000 Units total) by mouth once daily. 90 tablet 3    EScitalopram oxalate (LEXAPRO) 10 MG tablet Take 1 tablet (10 mg total) by mouth every evening. 90 tablet 3    ezetimibe (ZETIA) 10 mg tablet Take 1 tablet (10 mg total) by mouth once daily. 90 tablet 3    famotidine (PEPCID) 20 MG tablet Take 1 tablet (20 mg total) by mouth every evening. 30 tablet 0    insulin aspart U-100 (NOVOLOG) 100 unit/mL (3 mL) InPn pen Inject 3 Units into the skin 3 (three) times daily. + SSI (TDD: 24) 6 mL 0    lancets Misc 1 each by Misc.(Non-Drug; Combo Route) route 3 (three) times daily. 100  "each 2    levocetirizine (XYZAL) 5 MG tablet Take 1 tablet (5 mg total) by mouth daily as needed for Allergies. 90 tablet 3    multivitamin Tab Take 1 tablet by mouth once daily.      mycophenolate (CELLCEPT) 250 mg Cap Take 4 capsules (1,000 mg total) by mouth 2 (two) times daily. 240 capsule 11    NIFEdipine (PROCARDIA-XL) 30 MG (OSM) 24 hr tablet Take 1 tablet (30 mg total) by mouth once daily. 30 tablet 5    pen needle, diabetic 32 gauge x 5/32" Ndle 1 each by Misc.(Non-Drug; Combo Route) route 3 (three) times daily. 100 each 0    predniSONE (DELTASONE) 5 MG tablet Take by mouth daily; 5/26/2024-6/1/2024: 20 mg, 6/2/2024-6/8/2024: 15 mg; 6/9/2024-6/15/2024: 10 mg; 6/16/2024- forever: 5 mg; do not stop (Patient taking differently: Take 5 mg by mouth once daily. Take by mouth daily; 5/26/2024-6/1/2024: 20 mg, 6/2/2024-6/8/2024: 15 mg; 6/9/2024-6/15/2024: 10 mg; 6/16/2024- forever: 5 mg; do not stop) 70 tablet 11    SYNTHROID 200 mcg tablet Take 1 tablet (200 mcg total) by mouth before breakfast. 90 tablet 3    tacrolimus (PROGRAF) 1 MG Cap Take 4 capsules (4 mg total) by mouth every morning AND 3 capsules (3 mg total) every evening. 210 capsule 11    traZODone (DESYREL) 50 MG tablet Take 1 to 2 tablets ( mg total) by mouth nightly as needed for Insomnia. 120 tablet 3    valGANciclovir (VALCYTE) 450 mg Tab Take 2 tablets (900 mg total) by mouth every morning. Stop 8/21/24 60 tablet 2    [DISCONTINUED] oxyCODONE (ROXICODONE) 5 MG immediate release tablet Take 1 tablet (5 mg total) by mouth every 6 (six) hours as needed for Pain. (Patient not taking: Reported on 6/19/2024) 28 tablet 0     No current facility-administered medications on file prior to visit.        Review of patient's allergies indicates:   Allergen Reactions    Atorvastatin Other (See Comments)     Statin induced myalgia    Mushroom Hives    Peanut Anaphylaxis    Bactrim [sulfamethoxazole-trimethoprim] Hives    Gabapentin      Pruritis     " Iodine     Peanut butter flavor     Penicillins Hives    Shellfish containing products Itching    Sulfa (sulfonamide antibiotics) Hives       Medications Reconciliation:   I have reconciled the patient's home medications and discharge medications with the patient/family. I have updated all changes.  Refer to After-Visit Medication List.    OBJECTIVE:     Previous Weight and BMI:  Wt Readings from Last 3 Encounters:   06/19/24 1441 104 kg (229 lb 4.5 oz)   06/19/24 1440 104 kg (229 lb 4.5 oz)   06/12/24 0925 104.5 kg (230 lb 4.3 oz)     There is no height or weight on file to calculate BMI.     Physical Exam per Video and/or Audio:  Voice: Normal speech.  General: Well developed, well nourished. No distress.  HEENT: Face is symmetric. Mouth - normal tongue and visible oral mucosa per video.  Eyes: Clear conjunctiva.  Lungs: Normal respiratory effort.  Extremities: No LE edema.   Skin: No rashes.  Psychiatric: Normal affect. Alert.    Laboratory  Lab Results   Component Value Date    WBC 7.26 06/24/2024    HGB 11.4 (L) 06/24/2024    HCT 35.2 (L) 06/24/2024     06/24/2024    CHOL 176 05/02/2024    TRIG 127 05/02/2024    HDL 60 05/02/2024    ALT 11 05/02/2024    AST 17 05/02/2024     06/24/2024    K 4.4 06/24/2024     06/24/2024    CREATININE 1.4 06/24/2024    BUN 17 06/24/2024    CO2 20 (L) 06/24/2024    TSH 43.212 (H) 09/11/2022    INR 1.1 05/27/2024    HGBA1C 5.5 05/23/2024         ASSESSMENT & PLAN:     S/P kidney transplant 5/23/2024  Prophylactic immunotherapy  Metabolic bone disease  Long-term use of immunosuppressant medication  Anemia of chronic disease  On Prednisone 5 mg daily        Cellcept 1000 mg BID         Prograf 4 am am, 3 mg pm.    Chronic diastolic heart failure  Pulmonary HTN  Renovascular hypertension  - Compensated CHF.    Could not get DIgital HTN to work for her.         CHF/HTN Regimen:   -   Procardia XL 30 mg am only.      Carvedilol 6.25 mg night only.     Type 2 diabetes  mellitus Diabetic Neuropathy and retinopathy  Diabetic neuropathy  - Diet controlled. A1c 5.5       On short term insulin due to steroid for transplant. Will be discontinued soon.     Mixed hyperlipidemia  Statin Induced myopathy.  - LDL goal < 100    Last LDL 88 ()     - Statin induced myopathy with atorvastatin.       On:  -     ezetimibe (ZETIA) 10 mg tablet; Take 1 tablet (10 mg total) by mouth once daily.        Vitamin D deficiency  - Change from weekly to daily Vitamin D3 5000 IU daily OTC.   -     cholecalciferol, vitamin D3, 125 mcg (5,000 unit) Tab; Take 1 tablet (5,000 Units total) by mouth once daily.     Postoperative hypothyroidism     -     SYNTHROID 200 mcg tablet; Take 1 tablet (200 mcg total) by mouth before breakfast.      Generalized anxiety disorder  -     EScitalopram oxalate (LEXAPRO) 10 MG tablet nightly.  -     traZODone (DESYREL) 50 MG tablet; Take 1 tablet (50 mg total) by mouth nightly as needed for Insomnia.        Factor XI deficiency  - Usually get  FFP 2 units right before surgery for Factor XI deficiency     Class 2 severe obesity due to excess calories with serious comorbidity and body mass index (BMI) of 35.0 to 35.9 in adult  Body mass index is 35.42 kg/m².     Chronic allergic rhinitis  -     levocetirizine (XYZAL) 5 MG tablet; Take 1 tablet (5 mg total) by mouth daily as needed for Allergies.      Preventive Health Maintenance:     Vaccine to be updated next visit.     -     TSH; Future; Expected date: 06/25/2024  -     Vitamin D; Future; Expected date: 12/22/2024    Return to Clinic for Follow Up with me:   9/24 in person.    Total time spent with patient: 25 min.  Each patient to whom he or she provides medical services by telemedicine is:  (1) informed of the relationship between the physician and patient and the respective role of any other health care provider with respect to management of the patient; and (2) notified that he or she may decline to receive medical  "services by telemedicine and may withdraw from such care at any time.      Scheduled Follow-up :  Future Appointments   Date Time Provider Department Center   7/1/2024  7:20 AM LAB, [facility]   7/1/2024  7[facility]30 [facility]   7/3/2024  1:30 PM Leticia Lares DO [facility]   7/8/2024  7:10 AM LAB, [facility]   7/8/2024  7:30 AM SPEC LAB, [facility]   7/17/2024  2:30 PM Maria Del Carmen Holland, NP [facility] [place]   7/31/2024  1:00 PM Maria Del Carmen Holland, NP [facility]   8/14/2024  1:00 PM Maria Del Carmen Holland, NP [facility]   9/16/2024  1:00 PM DIABETES DISCHARGE CLINIC [facility]   9/24/2024  9:00 AM Elan Price MD [facility]       After Visit Medication List :     Medication List            Accurate as of June 25, 2024  9:24 AM. If you have any questions, ask your nurse or doctor.                CHANGE how you take these medications      predniSONE 5 MG tablet  Commonly known as: DELTASONE  Take by mouth daily; 5/26/2024-6/1/2024: 20 mg, 6/2/2024-6/8/2024: 15 mg; 6/9/2024-6/15/2024: 10 mg; 6/16/2024- forever: 5 mg; do not stop  What changed:   how much to take  how to take this  when to take this            CONTINUE taking these medications      ACCU-CHEK GUIDE GLUCOSE METER Misc  Generic drug: blood-glucose meter  Use as instructed     ACCU-CHEK GUIDE TEST STRIPS Strp  Generic drug: blood sugar diagnostic  1 each by Misc.(Non-Drug; Combo Route) route 3 (three) times daily.     ACCU-CHEK SOFTCLIX LANCETS Misc  Generic drug: lancets  1 each by Misc.(Non-Drug; Combo Route) route 3 (three) times daily.     atovaquone 750 mg/5 mL Susp oral liquid  Commonly known as: MEPRON  Take 10 mLs (1,500 mg total) by mouth once daily. Stop 11/19/24     BD ULTRA-FINE OLGA LIDIA PEN NEEDLE 32 gauge x 5/32" Ndle  Generic drug: pen needle, diabetic  1 each by Misc.(Non-Drug; Combo Route) route 3 (three) times " daily.     YAMINI-GEST ANTACID 200 mg calcium (500 mg) chewable tablet  Generic drug: calcium carbonate  CHEW 2 tablets (1,000 mg total) by mouth 2 (two) times daily.     carvediloL 6.25 MG tablet  Commonly known as: COREG  Take 1 tablet (6.25 mg total) by mouth 2 (two) times daily.     cholecalciferol (vitamin D3) 125 mcg (5,000 unit) Tab  Take 1 tablet (5,000 Units total) by mouth once daily.     EScitalopram oxalate 10 MG tablet  Commonly known as: LEXAPRO  Take 1 tablet (10 mg total) by mouth every evening.     ezetimibe 10 mg tablet  Commonly known as: ZETIA  Take 1 tablet (10 mg total) by mouth once daily.     famotidine 20 MG tablet  Commonly known as: PEPCID  Take 1 tablet (20 mg total) by mouth every evening.     levocetirizine 5 MG tablet  Commonly known as: XYZAL  Take 1 tablet (5 mg total) by mouth daily as needed for Allergies.     multivitamin Tab     mycophenolate 250 mg Cap  Commonly known as: CELLCEPT  Take 4 capsules (1,000 mg total) by mouth 2 (two) times daily.     NIFEdipine 30 MG (OSM) 24 hr tablet  Commonly known as: PROCARDIA-XL  Take 1 tablet (30 mg total) by mouth once daily.     NovoLOG Flexpen U-100 Insulin 100 unit/mL (3 mL) Inpn pen  Generic drug: insulin aspart U-100  Inject 3 Units into the skin 3 (three) times daily. + SSI (TDD: 24)     SYNTHROID 200 mcg tablet  Generic drug: levothyroxine  Take 1 tablet (200 mcg total) by mouth before breakfast.     tacrolimus 1 MG Cap  Commonly known as: PROGRAF  Take 4 capsules (4 mg total) by mouth every morning AND 3 capsules (3 mg total) every evening.     traZODone 50 MG tablet  Commonly known as: DESYREL  Take 1 to 2 tablets ( mg total) by mouth nightly as needed for Insomnia.     valGANciclovir 450 mg Tab  Commonly known as: VALCYTE  Take 2 tablets (900 mg total) by mouth every morning. Stop 8/21/24            STOP taking these medications      oxyCODONE 5 MG immediate release tablet  Commonly known as: ROXICODONE  Stopped by: Elan DOBBINS  MD Albert              Signing Physician:  Elan Price MD

## 2024-06-25 NOTE — TELEPHONE ENCOUNTER
Message sent.     ----- Message from Leticia Lares DO sent at 6/25/2024 11:35 AM CDT -----  Increase her FK to 4 BID from 4/3

## 2024-07-01 ENCOUNTER — PATIENT MESSAGE (OUTPATIENT)
Dept: INTERNAL MEDICINE | Facility: CLINIC | Age: 64
End: 2024-07-01
Payer: MEDICARE

## 2024-07-01 ENCOUNTER — TELEPHONE (OUTPATIENT)
Dept: TRANSPLANT | Facility: CLINIC | Age: 64
End: 2024-07-01
Payer: MEDICARE

## 2024-07-01 DIAGNOSIS — E89.0 POSTOPERATIVE HYPOTHYROIDISM: Primary | Chronic | ICD-10-CM

## 2024-07-01 RX ORDER — LEVOTHYROXINE SODIUM 150 UG/1
150 TABLET ORAL
Qty: 90 TABLET | Refills: 3 | Status: SHIPPED | OUTPATIENT
Start: 2024-07-08

## 2024-07-01 NOTE — PROGRESS NOTES
Latest Reference Range & Units Most Recent   TSH 0.400 - 4.000 uIU/mL <0.010 (L)  7/1/24 07:07   Free T4 0.71 - 1.51 ng/dL 1.57 (H)  7/1/24 07:07       Plan:  Hold Synthroid for 1 weeks.  Decrease Synthroid from 200 mcg to 150 mg daily.  Recheck TSH in 3 months (ordered).    levothyroxine (SYNTHROID) 150 MCG tablet (Future) 150 mcg, Oral, Before breakfast 07/08/2024   90 tablet 3 ordered -- Elan Price MD          Med Note: Long-term: Dx Associated: Ordered Date & Time: 07/01/2024 0903Start: 07/08/2024Adh: Report             Change  Reorder  Discontinue  Ordering Department: [facility] INTERNAL MEDICINE  Order Details: Take 1 tablet (150 mcg total) by mouth before breakfast. Dispense: 90 tablet, Refills: 3 ordered

## 2024-07-01 NOTE — TELEPHONE ENCOUNTER
Message sent.     ----- Message from Leticia Lares DO sent at 7/1/2024  8:54 AM CDT -----  Near baseline graft function. Acceptable electrolytes  Stop tums BID for now  FK level pending

## 2024-07-03 PROBLEM — E11.22 TYPE 2 DIABETES MELLITUS WITH STAGE 3B CHRONIC KIDNEY DISEASE, WITH LONG-TERM CURRENT USE OF INSULIN: Status: ACTIVE | Noted: 2024-07-03

## 2024-07-03 PROBLEM — E66.09 CLASS 1 OBESITY DUE TO EXCESS CALORIES WITH BODY MASS INDEX (BMI) OF 33.0 TO 33.9 IN ADULT: Status: ACTIVE | Noted: 2020-05-13

## 2024-07-03 PROBLEM — Z79.4 TYPE 2 DIABETES MELLITUS WITH STAGE 3B CHRONIC KIDNEY DISEASE, WITH LONG-TERM CURRENT USE OF INSULIN: Status: ACTIVE | Noted: 2024-07-03

## 2024-07-03 PROBLEM — N18.32 TYPE 2 DIABETES MELLITUS WITH STAGE 3B CHRONIC KIDNEY DISEASE, WITH LONG-TERM CURRENT USE OF INSULIN: Status: ACTIVE | Noted: 2024-07-03

## 2024-07-03 PROBLEM — I12.9 BENIGN HYPERTENSION WITH CKD (CHRONIC KIDNEY DISEASE) STAGE III: Status: ACTIVE | Noted: 2018-04-09

## 2024-07-03 PROBLEM — N18.30 BENIGN HYPERTENSION WITH CKD (CHRONIC KIDNEY DISEASE) STAGE III: Status: ACTIVE | Noted: 2018-04-09

## 2024-07-03 PROBLEM — E66.811 CLASS 1 OBESITY DUE TO EXCESS CALORIES WITH BODY MASS INDEX (BMI) OF 33.0 TO 33.9 IN ADULT: Status: ACTIVE | Noted: 2020-05-13

## 2024-07-03 NOTE — PROGRESS NOTES
Kidney Post-Transplant Assessment    Referring Physician: Fred Kelly  Current Nephrologist: Fred Kelly    ORGAN: LEFT KIDNEY  Donor Type: living  PHS Increased Risk: no  Cold Ischemia: 60 mins  Induction Medications:     Subjective:     CC:  Reassessment of renal allograft function and management of chronic immunosuppression.    HPI:  Ms. Quezada is a 64 y.o. year old Black or  female with history of ESRD secondary to DM2 who received a living kidney transplant on 5/23/24. She has a history of CVA in 2017 without any deficit. She also has factor XII deficiency, history of hep C (s/p treatment with harvoni), and pHTN (RHC performed on 6/7/23 and found to have group 2 HTN. TTE and cardiac PET with preserved EF).  Her most recent creatinine is 1.4. She takes mycophenolate mofetil, prednisone, and tacrolimus for maintenance immunosuppression. Her post transplant course has been uncomplicated to date.  -Due to factor XII deficiency she received 1 unit of FFP in OR and 1 unit of FFP on POD#1. She received standard DVT prophylaxis while inpatient and was transitioned to prophylaxis lovenox x 2 weeks on discharge.     LOV 6/19/24   Interval HX:       Reports adequate Intake  UOP-no problems reported   Peripheral edema-no  Weight: lost weight since txp ~ 10 lbs  Appetite-denies N/V/D; tolerating IS meds well   Occasional ache  to RLQ / groin area , also reports numbness   Reports being more active   BP  BP Readings from Last 3 Encounters:   07/08/24 139/65   06/19/24 133/87   06/19/24 133/87       Lab /diagnostic results reviewed with patient today.   All questions answered       Past Medical History:   Diagnosis Date    Acute respiratory failure with hypoxia and hypercapnia 05/26/2019    ATN (acute tubular necrosis) 02/08/2019    Chronic diastolic heart failure 02/07/2019    2-8-19 Mild left atrial enlargement. Mild left ventricular enlargement. Normal left ventricular systolic function. The  estimated ejection fraction is 55% Indeterminate left ventricular diastolic function. Normal right ventricular systolic function. Mild mitral regurgitation. Mild to moderate tricuspid regurgitation. The estimated PA systolic pressure is 32 mm Hg Normal central venous pressure (3 m    CKD (chronic kidney disease) stage 4, GFR 15-29 ml/min 05/16/2018    CKD stage G3a/A3, GFR 45-59 and albumin creatinine ratio >300 mg/g 05/16/2018    CKD stage G3b/A3, GFR 30-44 and albumin creatinine ratio >300 mg/g 05/16/2018    Closed compression fracture of L2 lumbar vertebra 05/24/2019    Closed compression fracture of L2 lumbar vertebra 05/24/2019     IMO Regulatory Load October 2019    Clotted dialysis access 07/22/2023    Controlled type 2 diabetes with retinopathy 04/09/2018    COVID-19 virus infection 7-1-2021 07/01/2021    CVA (cerebral vascular accident) 2017    Diabetic polyneuropathy associated with type 2 diabetes mellitus 12/04/2020    Encounter for blood transfusion     Essential hypertension 04/09/2018    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  7-31-19 Factor XI Activity 55 - 145 % 96       Gastroesophageal reflux disease without esophagitis 05/24/2019    Generalized anxiety disorder 11/08/2019    GERD (gastroesophageal reflux disease)     GIB (gastrointestinal bleeding) 10/18/2018    History of CVA (cerebrovascular accident) 11/27/2022    History of CVA (cerebrovascular accident) without residual deficits 11/27/2022    History of hepatitis C     s/p succesful Rx w/ Harvoni  - SVR12 (cure) 2016    Hypertensive retinopathy, bilateral 12/10/2019    Mild nonproliferative diabetic retinopathy 05/02/2018    Mixed hyperlipidemia 04/09/2018    Myalgia due to statin 09/07/2022    Nephrotic range proteinuria 04/09/2018    Normocytic anemia 01/14/2022    NS (nuclear sclerosis), bilateral 12/10/2019    Peanut allergy 02/04/2023    PNA (pneumonia) 05/28/2019    Postoperative hypothyroidism     Prophylactic  "immunotherapy 05/24/2024    Pulmonary hypertension 02/06/2023    Renovascular hypertension 04/09/2018    S/P arteriovenous (AV) fistula creation 08/14/2019 8/14/19: L2 kyphoplasty with Dr. Kwon       S/P kidney transplant 05/24/2024    S/P kyphoplasty 08/14/2019 8/14/19: L2 kyphoplasty with Dr. Kwon     Slow transit constipation 05/27/2019    Type 2 diabetes mellitus with circulatory disorder, with long-term current use of insulin 04/09/2018    Type 2 diabetes mellitus with stage 3 chronic kidney disease, without long-term current use of insulin 04/09/2018    Type 2 diabetes mellitus with stage 3a chronic kidney disease, without long-term current use of insulin 03/03/2021    Type 2 diabetes mellitus with stage 3b chronic kidney disease, without long-term current use of insulin 09/07/2022       Review of Systems   Constitutional:  Negative for activity change and fever.   Eyes:  Positive for visual disturbance.        Wears glasses   Respiratory:  Negative for shortness of breath.    Cardiovascular:  Negative for chest pain and leg swelling.   Gastrointestinal:  Negative for constipation, diarrhea and nausea.   Genitourinary:  Negative for difficulty urinating, frequency and hematuria.   Musculoskeletal:  Negative for arthralgias and myalgias.   Allergic/Immunologic: Positive for immunocompromised state.   Neurological:  Negative for weakness and numbness.   Psychiatric/Behavioral:  Negative for sleep disturbance. The patient is not nervous/anxious.        Objective:   Blood pressure 139/65, pulse 83, temperature 97.7 °F (36.5 °C), temperature source Temporal, resp. rate 18, height 5' 9.02" (1.753 m), weight 102.2 kg (225 lb 5 oz), SpO2 98%.body mass index is 33.26 kg/m².    Physical Exam  Vitals and nursing note reviewed.   Constitutional:       Appearance: Normal appearance.   Cardiovascular:      Rate and Rhythm: Normal rate and regular rhythm.      Heart sounds: Normal heart sounds.   Pulmonary:      " Effort: Pulmonary effort is normal.      Breath sounds: Normal breath sounds.   Abdominal:      General: There is no distension.      Comments:  No bruit noted over the allograft      Musculoskeletal:         General: Normal range of motion.   Skin:     General: Skin is warm and dry.   Neurological:      Mental Status: She is alert.         Labs:  Lab Results   Component Value Date    WBC 8.01 07/08/2024    HGB 11.6 (L) 07/08/2024    HCT 36.0 (L) 07/08/2024     07/08/2024    K 4.2 07/08/2024     07/08/2024    CO2 21 (L) 07/08/2024    BUN 16 07/08/2024    CREATININE 1.4 07/08/2024    EGFRNORACEVR 42.5 (A) 07/08/2024    CALCIUM 9.7 07/08/2024    PHOS 3.7 07/08/2024    MG 1.7 07/08/2024    ALBUMIN 3.6 07/08/2024    AST 17 05/02/2024    ALT 11 05/02/2024    UTPCR Unable to calculate 07/01/2024    .7 (H) 08/10/2023    TACROLIMUS 8.3 07/01/2024       Labs were reviewed with the patient    Assessment:     1. S/P kidney transplant 5/23/24.    2. Long-term use of immunosuppressant medication    3. Class 1 obesity due to excess calories with serious comorbidity and body mass index (BMI) of 33.0 to 33.9 in adult    4. Type 2 diabetes mellitus with stage 3b chronic kidney disease, with long-term current use of insulin    5. Benign hypertension with CKD (chronic kidney disease) stage III    6. Anemia of chronic disease    7. Secondary hyperparathyroidism of renal origin        Plan:          Hyperthyroid --mgmt deferred to delano Price /PCP--Pt reports having thyroid med adjustment   Lab Results   Component Value Date    TSH <0.010 (L) 07/01/2024         1. Immunosuppression: Prograf trough 8.3,  Continue Prograf 4/3 ,  MMF 1000 mg BID, and Prednisone taper. Will continue to monitor for drug toxicities  Valcyte 900 mg QD for CMV prophylaxis   Atovaquone 1500 mg Qd for PCP prophylaxis       2. Allograft Function: ckd 3b, Stable. Continue good oral hydration.   - ESRD secondary to DM2 s/p living kidney  transplant on 5/23/24.   - post transplant course has been uncomplicated to date.  -BL ~1.3-1.4  Lab Results   Component Value Date    CREATININE 1.4 07/08/2024        Latest Reference Range & Units POD 46,  Kidney-Post 1 Year  07/08/24 07:10   eGFR >60 mL/min/1.73 m^2 42.5 !   !: Data is abnormal         DM : cont insulin as prescribed --mgmt deferred to endocrinology  Lab Results   Component Value Date    HGBA1C 5.5 05/23/2024       3. Hypertension management: advise low salt diet and home BP monitoring    Coreg, nifedipine    4. Metabolic Bone Disease/Secondary Hyperparathyroidism:  Vit D   Lab Results   Component Value Date    .7 (H) 08/10/2023    CALCIUM 9.7 07/08/2024    PHOS 3.7 07/08/2024         Latest Reference Range & Units POD 46,  Kidney-Post 1 Year  07/08/24 07:10   Magnesium  1.6 - 2.6 mg/dL 1.7         5. Electrolytes: stable. No need for intervention   Lab Results   Component Value Date     07/08/2024    K 4.2 07/08/2024     07/08/2024    CO2 21 (L) 07/08/2024       6. Anemia: stable. No need for intervention   Lab Results   Component Value Date    WBC 8.01 07/08/2024    HGB 11.6 (L) 07/08/2024    HCT 36.0 (L) 07/08/2024    MCV 84 07/08/2024     07/08/2024         7. Proteinuria: continue p/c ratio as per guidelines   [facility]  7/1/24 [facility]      U/A 7/1/24    8. BK virus infection screening:  BK PCR per protocol     9. Weight education: provided during the clinic visit   Body mass index is 33.26 kg/m².     10. Patient safety education regarding immunosuppression including prophylaxis posttransplant for CMV, PCP    PCP ppx: Atovaquone until 11/19/24  CMV ppx: Valcyte until 8/21/24     Follow-up:   Clinic: return to transplant clinic weekly for the first month after transplant; every 2 weeks during months 2-3; then at 6-, 9-, 12-, 18-, 24-, and 36- months post-transplant to reassess for complications from immunosuppression toxicity and monitor for rejection.  Annually  thereafter.    Labs: since patient remains at high risk for rejection and drug-related complications that warrant close monitoring, labs will be ordered as follows: continue twice weekly CBC, renal panel, and drug level for first month; then same labs once weekly through 3rd month post-transplant.  Urine for UA and protein/creatinine ratio monthly.  Serum BK - PCR at 1-, 3-, 6-, 9-, 12-, 18-, 24-, 36-, 48-, and 60 months post-transplant.  Hepatic panel at 1-, 2-, 3-, 6-, 9-, 12-, 18-, 24-, and 36- months post-transplant.    Delilah Scherer NP       Education:   Material provided to the patient.  Patient reminded to call with any health changes since these can be early signs of significant complications.  Also, I advised the patient to be sure any new medications or changes of old medications are discussed with either a pharmacist or physician knowledgeable with transplant to avoid rejection/drug toxicity related to significant drug interactions.    Patient advised that it is recommended that all transplanted patients, and their close contacts and household members receive Covid vaccination.

## 2024-07-04 ENCOUNTER — PATIENT MESSAGE (OUTPATIENT)
Dept: ENDOCRINOLOGY | Facility: HOSPITAL | Age: 64
End: 2024-07-04
Payer: MEDICARE

## 2024-07-04 DIAGNOSIS — E11.3553 CONTROLLED TYPE 2 DIABETES MELLITUS WITH STABLE PROLIFERATIVE RETINOPATHY OF BOTH EYES, UNSPECIFIED WHETHER LONG TERM INSULIN USE: Chronic | ICD-10-CM

## 2024-07-04 RX ORDER — INSULIN ASPART 100 [IU]/ML
3 INJECTION, SOLUTION INTRAVENOUS; SUBCUTANEOUS 3 TIMES DAILY
Qty: 6 ML | Refills: 0 | Status: CANCELLED | OUTPATIENT
Start: 2024-07-04

## 2024-07-08 ENCOUNTER — OFFICE VISIT (OUTPATIENT)
Dept: TRANSPLANT | Facility: CLINIC | Age: 64
End: 2024-07-08
Payer: MEDICARE

## 2024-07-08 VITALS
SYSTOLIC BLOOD PRESSURE: 139 MMHG | HEIGHT: 69 IN | TEMPERATURE: 98 F | DIASTOLIC BLOOD PRESSURE: 65 MMHG | WEIGHT: 225.31 LBS | BODY MASS INDEX: 33.37 KG/M2 | RESPIRATION RATE: 18 BRPM | OXYGEN SATURATION: 98 % | HEART RATE: 83 BPM

## 2024-07-08 DIAGNOSIS — N18.30 BENIGN HYPERTENSION WITH CKD (CHRONIC KIDNEY DISEASE) STAGE III: ICD-10-CM

## 2024-07-08 DIAGNOSIS — E66.09 CLASS 1 OBESITY DUE TO EXCESS CALORIES WITH SERIOUS COMORBIDITY AND BODY MASS INDEX (BMI) OF 33.0 TO 33.9 IN ADULT: ICD-10-CM

## 2024-07-08 DIAGNOSIS — D63.8 ANEMIA OF CHRONIC DISEASE: ICD-10-CM

## 2024-07-08 DIAGNOSIS — N25.81 SECONDARY HYPERPARATHYROIDISM OF RENAL ORIGIN: ICD-10-CM

## 2024-07-08 DIAGNOSIS — Z94.0 S/P KIDNEY TRANSPLANT: Primary | ICD-10-CM

## 2024-07-08 DIAGNOSIS — I12.9 BENIGN HYPERTENSION WITH CKD (CHRONIC KIDNEY DISEASE) STAGE III: ICD-10-CM

## 2024-07-08 DIAGNOSIS — N18.32 TYPE 2 DIABETES MELLITUS WITH STAGE 3B CHRONIC KIDNEY DISEASE, WITH LONG-TERM CURRENT USE OF INSULIN: ICD-10-CM

## 2024-07-08 DIAGNOSIS — E11.22 TYPE 2 DIABETES MELLITUS WITH STAGE 3B CHRONIC KIDNEY DISEASE, WITH LONG-TERM CURRENT USE OF INSULIN: ICD-10-CM

## 2024-07-08 DIAGNOSIS — E11.3553 CONTROLLED TYPE 2 DIABETES MELLITUS WITH STABLE PROLIFERATIVE RETINOPATHY OF BOTH EYES, UNSPECIFIED WHETHER LONG TERM INSULIN USE: Chronic | ICD-10-CM

## 2024-07-08 DIAGNOSIS — Z79.4 TYPE 2 DIABETES MELLITUS WITH STAGE 3B CHRONIC KIDNEY DISEASE, WITH LONG-TERM CURRENT USE OF INSULIN: ICD-10-CM

## 2024-07-08 DIAGNOSIS — Z79.60 LONG-TERM USE OF IMMUNOSUPPRESSANT MEDICATION: ICD-10-CM

## 2024-07-08 PROCEDURE — 99999 PR PBB SHADOW E&M-EST. PATIENT-LVL V: CPT | Mod: PBBFAC,HCNC,, | Performed by: NURSE PRACTITIONER

## 2024-07-08 PROCEDURE — 3075F SYST BP GE 130 - 139MM HG: CPT | Mod: HCNC,CPTII,S$GLB, | Performed by: NURSE PRACTITIONER

## 2024-07-08 PROCEDURE — 1159F MED LIST DOCD IN RCRD: CPT | Mod: HCNC,CPTII,S$GLB, | Performed by: NURSE PRACTITIONER

## 2024-07-08 PROCEDURE — 99215 OFFICE O/P EST HI 40 MIN: CPT | Mod: HCNC,S$GLB,, | Performed by: NURSE PRACTITIONER

## 2024-07-08 PROCEDURE — 3008F BODY MASS INDEX DOCD: CPT | Mod: HCNC,CPTII,S$GLB, | Performed by: NURSE PRACTITIONER

## 2024-07-08 PROCEDURE — 3078F DIAST BP <80 MM HG: CPT | Mod: HCNC,CPTII,S$GLB, | Performed by: NURSE PRACTITIONER

## 2024-07-08 PROCEDURE — 4010F ACE/ARB THERAPY RXD/TAKEN: CPT | Mod: HCNC,CPTII,S$GLB, | Performed by: NURSE PRACTITIONER

## 2024-07-08 PROCEDURE — 1160F RVW MEDS BY RX/DR IN RCRD: CPT | Mod: HCNC,CPTII,S$GLB, | Performed by: NURSE PRACTITIONER

## 2024-07-08 PROCEDURE — 3066F NEPHROPATHY DOC TX: CPT | Mod: HCNC,CPTII,S$GLB, | Performed by: NURSE PRACTITIONER

## 2024-07-08 PROCEDURE — 3044F HG A1C LEVEL LT 7.0%: CPT | Mod: HCNC,CPTII,S$GLB, | Performed by: NURSE PRACTITIONER

## 2024-07-08 RX ORDER — INSULIN ASPART 100 [IU]/ML
3 INJECTION, SOLUTION INTRAVENOUS; SUBCUTANEOUS 3 TIMES DAILY
Qty: 6 ML | Refills: 0 | Status: CANCELLED | OUTPATIENT
Start: 2024-07-04

## 2024-07-08 NOTE — LETTER
July 8, 2024        Fred Kelly  [address]  [facility] [address]  Phone: [phone]  Fax: [phone]             [facility]st Fl  [address]  [facility] [address]  Phone: [phone]   Patient: Kathie Quezada   MR Number: [id number]   YOB: 1960   Date of Visit: 7/8/2024       Dear Dr. Fred Kelly    Thank you for referring Kathie Quezada to me for evaluation. Attached you will find relevant portions of my assessment and plan of care.    If you have questions, please do not hesitate to call me. I look forward to following Kathie Quezada along with you.    Sincerely,    Delilah Scherer, NP    Enclosure    If you would like to receive this communication electronically, please contact [email or web address] or [phone] to request [employer] access.    [employer] is a tool which provides read-only access to select patient information with whom you have a relationship. Its easy to use and provides real time access to review your patients record including encounter summaries, notes, results, and demographic information.    If you feel you have received this communication in error or would no longer like to receive these types of communications, please e-mail [email or web address]

## 2024-07-09 DIAGNOSIS — Z94.0 S/P KIDNEY TRANSPLANT: ICD-10-CM

## 2024-07-09 RX ORDER — TACROLIMUS 1 MG/1
CAPSULE ORAL
Qty: 240 CAPSULE | Refills: 11 | Status: SHIPPED | OUTPATIENT
Start: 2024-07-09

## 2024-07-09 NOTE — TELEPHONE ENCOUNTER
Message sent    ----- Message from Luis Miguel Alonso MD sent at 7/9/2024  9:57 AM CDT -----  Please lower prograf to 3 mg PO bid and repeat a level in 2 weeks

## 2024-07-10 DIAGNOSIS — E11.3553 CONTROLLED TYPE 2 DIABETES MELLITUS WITH STABLE PROLIFERATIVE RETINOPATHY OF BOTH EYES, UNSPECIFIED WHETHER LONG TERM INSULIN USE: Chronic | ICD-10-CM

## 2024-07-10 RX ORDER — INSULIN ASPART 100 [IU]/ML
3 INJECTION, SOLUTION INTRAVENOUS; SUBCUTANEOUS 3 TIMES DAILY
Qty: 8 ML | Refills: 1 | Status: SHIPPED | OUTPATIENT
Start: 2024-07-10

## 2024-07-10 NOTE — TELEPHONE ENCOUNTER
Refill Routing Note   Medication(s) are not appropriate for processing by [facility] for the following reason(s):        No active prescription written by provider    [facility] action(s):  Defer             Appointments  past 12m or future 3m with PCP    Date Provider   Last Visit   6/25/2024 Elan Price MD   Next Visit   9/24/2024 Ealn Price MD   ED visits in past 90 days: 0        Note composed:9:34 AM 07/10/2024

## 2024-07-10 NOTE — TELEPHONE ENCOUNTER
No care due was identified.  [facility] Embedded Care Due Messages. Reference number: [id number].   7/10/2024 9:33:53 AM CDT

## 2024-07-16 ENCOUNTER — PATIENT MESSAGE (OUTPATIENT)
Dept: INTERNAL MEDICINE | Facility: CLINIC | Age: 64
End: 2024-07-16
Payer: MEDICARE

## 2024-07-16 DIAGNOSIS — E11.3553 CONTROLLED TYPE 2 DIABETES MELLITUS WITH STABLE PROLIFERATIVE RETINOPATHY OF BOTH EYES, UNSPECIFIED WHETHER LONG TERM INSULIN USE: Chronic | ICD-10-CM

## 2024-07-16 DIAGNOSIS — Z94.0 S/P KIDNEY TRANSPLANT: ICD-10-CM

## 2024-07-16 RX ORDER — TACROLIMUS 1 MG/1
CAPSULE ORAL
Qty: 240 CAPSULE | Refills: 11 | Status: SHIPPED | OUTPATIENT
Start: 2024-07-16

## 2024-07-16 NOTE — TELEPHONE ENCOUNTER
Message sent.     ----- Message from Leticia Lares DO sent at 7/16/2024  2:49 PM CDT -----  High FK. Is it a true FK trough? If it is a true Fk trough, have her hold FK dose tonight and start at 2 mg BID starting tomorrow morning

## 2024-07-17 ENCOUNTER — OFFICE VISIT (OUTPATIENT)
Dept: TRANSPLANT | Facility: CLINIC | Age: 64
End: 2024-07-17
Payer: MEDICARE

## 2024-07-17 VITALS
BODY MASS INDEX: 33.27 KG/M2 | RESPIRATION RATE: 16 BRPM | TEMPERATURE: 97 F | DIASTOLIC BLOOD PRESSURE: 61 MMHG | WEIGHT: 224.63 LBS | HEIGHT: 69 IN | OXYGEN SATURATION: 97 % | SYSTOLIC BLOOD PRESSURE: 109 MMHG | HEART RATE: 79 BPM

## 2024-07-17 DIAGNOSIS — Z94.0 S/P KIDNEY TRANSPLANT: Primary | ICD-10-CM

## 2024-07-17 DIAGNOSIS — E11.22 TYPE 2 DIABETES MELLITUS WITH STAGE 3B CHRONIC KIDNEY DISEASE, WITH LONG-TERM CURRENT USE OF INSULIN: ICD-10-CM

## 2024-07-17 DIAGNOSIS — Z79.60 LONG-TERM USE OF IMMUNOSUPPRESSANT MEDICATION: ICD-10-CM

## 2024-07-17 DIAGNOSIS — Z79.4 TYPE 2 DIABETES MELLITUS WITH STAGE 3B CHRONIC KIDNEY DISEASE, WITH LONG-TERM CURRENT USE OF INSULIN: ICD-10-CM

## 2024-07-17 DIAGNOSIS — N18.32 TYPE 2 DIABETES MELLITUS WITH STAGE 3B CHRONIC KIDNEY DISEASE, WITH LONG-TERM CURRENT USE OF INSULIN: ICD-10-CM

## 2024-07-17 DIAGNOSIS — I12.9 BENIGN HYPERTENSION WITH CKD (CHRONIC KIDNEY DISEASE) STAGE III: ICD-10-CM

## 2024-07-17 DIAGNOSIS — N18.30 BENIGN HYPERTENSION WITH CKD (CHRONIC KIDNEY DISEASE) STAGE III: ICD-10-CM

## 2024-07-17 PROCEDURE — 3066F NEPHROPATHY DOC TX: CPT | Mod: HCNC,CPTII,S$GLB, | Performed by: NURSE PRACTITIONER

## 2024-07-17 PROCEDURE — 4010F ACE/ARB THERAPY RXD/TAKEN: CPT | Mod: HCNC,CPTII,S$GLB, | Performed by: NURSE PRACTITIONER

## 2024-07-17 PROCEDURE — 1159F MED LIST DOCD IN RCRD: CPT | Mod: HCNC,CPTII,S$GLB, | Performed by: NURSE PRACTITIONER

## 2024-07-17 PROCEDURE — 3044F HG A1C LEVEL LT 7.0%: CPT | Mod: HCNC,CPTII,S$GLB, | Performed by: NURSE PRACTITIONER

## 2024-07-17 PROCEDURE — 3008F BODY MASS INDEX DOCD: CPT | Mod: HCNC,CPTII,S$GLB, | Performed by: NURSE PRACTITIONER

## 2024-07-17 PROCEDURE — 99999 PR PBB SHADOW E&M-EST. PATIENT-LVL IV: CPT | Mod: PBBFAC,HCNC,, | Performed by: NURSE PRACTITIONER

## 2024-07-17 PROCEDURE — 99215 OFFICE O/P EST HI 40 MIN: CPT | Mod: HCNC,S$GLB,, | Performed by: NURSE PRACTITIONER

## 2024-07-17 PROCEDURE — 3078F DIAST BP <80 MM HG: CPT | Mod: HCNC,CPTII,S$GLB, | Performed by: NURSE PRACTITIONER

## 2024-07-17 PROCEDURE — 3074F SYST BP LT 130 MM HG: CPT | Mod: HCNC,CPTII,S$GLB, | Performed by: NURSE PRACTITIONER

## 2024-07-17 RX ORDER — PEN NEEDLE, DIABETIC 30 GX3/16"
1 NEEDLE, DISPOSABLE MISCELLANEOUS
Qty: 200 EACH | Refills: 4 | Status: SHIPPED | OUTPATIENT
Start: 2024-07-17

## 2024-07-17 NOTE — TELEPHONE ENCOUNTER
No care due was identified.  [facility] Embedded Care Due Messages. Reference number: [id number].   7/17/2024 8:30:34 AM CDT

## 2024-07-17 NOTE — LETTER
July 17, 2024        Fred Kelly  [address]  [facility] [address]  Phone: [phone]  Fax: [phone]             [facility]  [address]  [facility] [address]  Phone: [phone]   Patient: Kathie Quezada   MR Number: [id number]   YOB: 1960   Date of Visit: 7/17/2024       Dear Dr. Fred Kelly    Thank you for referring Kathie Quezada to me for evaluation. Attached you will find relevant portions of my assessment and plan of care.    If you have questions, please do not hesitate to call me. I look forward to following Kathie Quezada along with you.    Sincerely,    Maria Del Carmen Holland NP    Enclosure    If you would like to receive this communication electronically, please contact [email or web address] or [phone] to request [employer] access.    [employer] is a tool which provides read-only access to select patient information with whom you have a relationship. Its easy to use and provides real time access to review your patients record including encounter summaries, notes, results, and demographic information.    If you feel you have received this communication in error or would no longer like to receive these types of communications, please e-mail [email or web address]

## 2024-07-17 NOTE — PROGRESS NOTES
Kidney Post-Transplant Assessment    Referring Physician: Fred Kelly  Current Nephrologist: Fred Kelly    ORGAN: LEFT KIDNEY  Donor Type: living  PHS Increased Risk: no  Cold Ischemia: 60 mins  Induction Medications:     Subjective:     CC:  Reassessment of renal allograft function and management of chronic immunosuppression.    HPI:  Ms. Quezada is a 64 y.o. year old Black or  female with history of ESRD secondary to DM2 who received a living kidney transplant on 5/23/24. She has a history of CVA in 2017 without any deficit. She also has factor XII deficiency, history of hep C (s/p treatment with harvoni), and pHTN (RHC performed on 6/7/23 and found to have group 2 HTN. TTE and cardiac PET with preserved EF).  Her most recent creatinine is 1.4. She takes mycophenolate mofetil, prednisone, and tacrolimus for maintenance immunosuppression. Her post transplant course has been uncomplicated to date.  -Due to factor XII deficiency she received 1 unit of FFP in OR and 1 unit of FFP on POD#1. She received standard DVT prophylaxis while inpatient and was transitioned to prophylaxis lovenox x 2 weeks on discharge.     Interval HX:   Reports doing well. Has been following with Dr. Price. She had been out her insulin needles for 2 weeks. She is  some at the pharmacy today. Her blood sugars at home is 200-300s. She stated she made sure she didn't eat sugar since hadn't had her insulin.     Reports adequate Intake  UOP-no problems reported   Peripheral edema-no  Weight: lost weight since txp ~ 10 lbs  Appetite-denies N/V/D; tolerating IS meds well   Reports being more active   BP  BP Readings from Last 3 Encounters:   07/17/24 109/61   07/08/24 139/65   06/19/24 133/87       Lab /diagnostic results reviewed with patient today.   All questions answered       Past Medical History:   Diagnosis Date    Acute respiratory failure with hypoxia and hypercapnia 05/26/2019    ATN (acute tubular necrosis)  02/08/2019    Chronic diastolic heart failure 02/07/2019    2-8-19 Mild left atrial enlargement. Mild left ventricular enlargement. Normal left ventricular systolic function. The estimated ejection fraction is 55% Indeterminate left ventricular diastolic function. Normal right ventricular systolic function. Mild mitral regurgitation. Mild to moderate tricuspid regurgitation. The estimated PA systolic pressure is 32 mm Hg Normal central venous pressure (3 m    CKD (chronic kidney disease) stage 4, GFR 15-29 ml/min 05/16/2018    CKD stage G3a/A3, GFR 45-59 and albumin creatinine ratio >300 mg/g 05/16/2018    CKD stage G3b/A3, GFR 30-44 and albumin creatinine ratio >300 mg/g 05/16/2018    Closed compression fracture of L2 lumbar vertebra 05/24/2019    Closed compression fracture of L2 lumbar vertebra 05/24/2019     IMO Regulatory Load October 2019    Clotted dialysis access 07/22/2023    Controlled type 2 diabetes with retinopathy 04/09/2018    COVID-19 virus infection 7-1-2021 07/01/2021    CVA (cerebral vascular accident) 2017    Diabetic polyneuropathy associated with type 2 diabetes mellitus 12/04/2020    Encounter for blood transfusion     Essential hypertension 04/09/2018    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  7-31-19 Factor XI Activity 55 - 145 % 96       Gastroesophageal reflux disease without esophagitis 05/24/2019    Generalized anxiety disorder 11/08/2019    GERD (gastroesophageal reflux disease)     GIB (gastrointestinal bleeding) 10/18/2018    History of CVA (cerebrovascular accident) 11/27/2022    History of CVA (cerebrovascular accident) without residual deficits 11/27/2022    History of hepatitis C     s/p succesful Rx w/ Harvminnie  - SVR12 (cure) 2016    Hypertensive retinopathy, bilateral 12/10/2019    Mild nonproliferative diabetic retinopathy 05/02/2018    Mixed hyperlipidemia 04/09/2018    Myalgia due to statin 09/07/2022    Nephrotic range proteinuria 04/09/2018     "Normocytic anemia 01/14/2022    NS (nuclear sclerosis), bilateral 12/10/2019    Peanut allergy 02/04/2023    PNA (pneumonia) 05/28/2019    Postoperative hypothyroidism     Prophylactic immunotherapy 05/24/2024    Pulmonary hypertension 02/06/2023    Renovascular hypertension 04/09/2018    S/P arteriovenous (AV) fistula creation 08/14/2019 8/14/19: L2 kyphoplasty with Dr. Kwon       S/ASIA kidney transplant 05/24/2024    S/P kyphoplasty 08/14/2019 8/14/19: L2 kyphoplasty with Dr. Kwon     Slow transit constipation 05/27/2019    Type 2 diabetes mellitus with circulatory disorder, with long-term current use of insulin 04/09/2018    Type 2 diabetes mellitus with stage 3 chronic kidney disease, without long-term current use of insulin 04/09/2018    Type 2 diabetes mellitus with stage 3a chronic kidney disease, without long-term current use of insulin 03/03/2021    Type 2 diabetes mellitus with stage 3b chronic kidney disease, without long-term current use of insulin 09/07/2022       Review of Systems   Constitutional:  Negative for activity change and fever.   Eyes:  Positive for visual disturbance.        Wears glasses   Respiratory:  Negative for shortness of breath.    Cardiovascular:  Negative for chest pain and leg swelling.   Gastrointestinal:  Negative for constipation, diarrhea and nausea.   Genitourinary:  Negative for difficulty urinating, frequency and hematuria.   Musculoskeletal:  Negative for arthralgias and myalgias.   Allergic/Immunologic: Positive for immunocompromised state.   Neurological:  Negative for weakness and numbness.   Psychiatric/Behavioral:  Negative for sleep disturbance. The patient is not nervous/anxious.        Objective:   Blood pressure 109/61, pulse 79, temperature 97.2 °F (36.2 °C), temperature source Temporal, resp. rate 16, height 5' 9" (1.753 m), weight 101.9 kg (224 lb 10.4 oz), SpO2 97%.body mass index is 33.17 kg/m².    Physical Exam  Vitals and nursing note reviewed. "   Constitutional:       Appearance: Normal appearance.   Cardiovascular:      Rate and Rhythm: Normal rate and regular rhythm.      Heart sounds: Normal heart sounds.   Pulmonary:      Effort: Pulmonary effort is normal.      Breath sounds: Normal breath sounds.   Abdominal:      General: There is no distension.      Comments:  No bruit noted over the allograft      Musculoskeletal:         General: Normal range of motion.   Skin:     General: Skin is warm and dry.   Neurological:      Mental Status: She is alert.         Labs:  Lab Results   Component Value Date    WBC 8.86 07/15/2024    HGB 11.6 (L) 07/15/2024    HCT 36.0 (L) 07/15/2024     07/15/2024    K 3.8 07/15/2024     07/15/2024    CO2 21 (L) 07/15/2024    BUN 13 07/15/2024    CREATININE 1.4 07/15/2024    EGFRNORACEVR 42.5 (A) 07/15/2024    CALCIUM 9.2 07/15/2024    PHOS 3.3 07/15/2024    MG 1.6 07/15/2024    ALBUMIN 3.7 07/15/2024    AST 17 05/02/2024    ALT 11 05/02/2024    UTPCR Unable to calculate 07/15/2024    .7 (H) 08/10/2023    TACROLIMUS 15.3 (H) 07/15/2024       Labs were reviewed with the patient    Assessment:     1. S/P kidney transplant 5/23/24.    2. Type 2 diabetes mellitus with stage 3b chronic kidney disease, with long-term current use of insulin    3. Long-term use of immunosuppressant medication    4. Benign hypertension with CKD (chronic kidney disease) stage III        Plan:    Routine f/u      1. Immunosuppression: Prograf trough 15.3,  Continue decreased Prograf 2/2  MMF 1000 mg BID, and Prednisone taper. Will continue to monitor for drug toxicities  Valcyte 900 mg QD for CMV prophylaxis   Atovaquone 1500 mg Qd for PCP prophylaxis       2. Allograft Function: ckd 3b, Stable. Continue good oral hydration.   - ESRD secondary to DM2 s/p living kidney transplant on 5/23/24.   - post transplant course has been uncomplicated to date.  -BL ~1.3-1.4  Lab Results   Component Value Date    CREATININE 1.4 07/15/2024         Latest Reference Range & Units POD 53,  Kidney-Post 1 Year  07/15/24   Creatinine 0.5 - 1.4 mg/dL 1.4   eGFR >60 mL/min/1.73 m^2 42.5 !   Glucose 70 - 110 mg/dL 308 (H)         DM : cont insulin as prescribed --mgmt deferred to endocrinology  Lab Results   Component Value Date    HGBA1C 5.5 05/23/2024       3. Hypertension management: advise low salt diet and home BP monitoring    Coreg, nifedipine    4. Metabolic Bone Disease/Secondary Hyperparathyroidism:  Vit D   Lab Results   Component Value Date    .7 (H) 08/10/2023    CALCIUM 9.2 07/15/2024    PHOS 3.3 07/15/2024         Latest Reference Range & Units POD 53,  Kidney-Post 1 Year  07/15/24   Magnesium  1.6 - 2.6 mg/dL 1.6         5. Electrolytes: stable. No need for intervention   Lab Results   Component Value Date     07/15/2024    K 3.8 07/15/2024     07/15/2024    CO2 21 (L) 07/15/2024       6. Anemia: stable. No need for intervention   Lab Results   Component Value Date    WBC 8.86 07/15/2024    HGB 11.6 (L) 07/15/2024    HCT 36.0 (L) 07/15/2024    MCV 86 07/15/2024     07/15/2024         7. Proteinuria: continue p/c ratio as per guidelines   [facility] [place]  7/15/24 [facility]        8. BK virus infection screening:  BK PCR per protocol   6/13/24  not detected <125    9. Weight education: provided during the clinic visit   Body mass index is 33.17 kg/m².     10. Patient safety education regarding immunosuppression including prophylaxis posttransplant for CMV, PCP    PCP ppx: Atovaquone until 11/19/24  CMV ppx: Valcyte until 8/21/24     Follow-up:   Clinic: return to transplant clinic weekly for the first month after transplant; every 2 weeks during months 2-3; then at 6-, 9-, 12-, 18-, 24-, and 36- months post-transplant to reassess for complications from immunosuppression toxicity and monitor for rejection.  Annually thereafter.    Labs: since patient remains at high risk for rejection and drug-related complications that warrant close monitoring,  labs will be ordered as follows: continue twice weekly CBC, renal panel, and drug level for first month; then same labs once weekly through 3rd month post-transplant.  Urine for UA and protein/creatinine ratio monthly.  Serum BK - PCR at 1-, 3-, 6-, 9-, 12-, 18-, 24-, 36-, 48-, and 60 months post-transplant.  Hepatic panel at 1-, 2-, 3-, 6-, 9-, 12-, 18-, 24-, and 36- months post-transplant.    Maria Del Carmen Holland NP       Education:   Material provided to the patient.  Patient reminded to call with any health changes since these can be early signs of significant complications.  Also, I advised the patient to be sure any new medications or changes of old medications are discussed with either a pharmacist or physician knowledgeable with transplant to avoid rejection/drug toxicity related to significant drug interactions.    Patient advised that it is recommended that all transplanted patients, and their close contacts and household members receive Covid vaccination.

## 2024-07-23 DIAGNOSIS — Z94.0 S/P KIDNEY TRANSPLANT: ICD-10-CM

## 2024-07-23 NOTE — TELEPHONE ENCOUNTER
----- Message from Leticia Lares DO sent at 7/23/2024  9:52 AM CDT -----  Continues to have high FK. Can we confirm that she is taking it at correct timings? If so, decrease Fk to 2/1 from 2 BID

## 2024-07-24 RX ORDER — TACROLIMUS 1 MG/1
3 CAPSULE ORAL EVERY 12 HOURS
Qty: 240 CAPSULE | Refills: 11 | Status: SHIPPED | OUTPATIENT
Start: 2024-07-24

## 2024-07-24 NOTE — TELEPHONE ENCOUNTER
Message sent to pt instructing her to lower prograf dose to 3mg twice a day. Repeat labs Monday - reviewed 12 hour level lab instructions.   ----- Message from Leticia Lares DO sent at 7/24/2024 11:04 AM CDT -----  Yes please. 3 mg BID  ----- Message -----  From: Rochelle Abarca RN  Sent: 7/24/2024  10:54 AM CDT  To: Leticia Lares DO    Patient reports close to 12 hour trough (13 hour) but is currently taking 4mg in AM and 3mg in PM - do you want to decrease to 3mg BID?      ----- Message -----  From: Leticia Lares DO  Sent: 7/23/2024   9:52 AM CDT  To: [facility] Post-Kidney Transplant Clinical    Continues to have high FK. Can we confirm that she is taking it at correct timings? If so, decrease Fk to 2/1 from 2 BID

## 2024-07-29 NOTE — PROGRESS NOTES
Kidney Post-Transplant Assessment    Referring Physician: Fred Kelly  Current Nephrologist: Fred Kelly    ORGAN: LEFT KIDNEY  Donor Type: living  PHS Increased Risk: no  Cold Ischemia: 60 mins  Induction Medications:     Subjective:     CC:  Reassessment of renal allograft function and management of chronic immunosuppression.    HPI:  Ms. Quezada is a 64 y.o. year old Black or  female with history of ESRD secondary to DM2 who received a living kidney transplant on 5/23/24. She has a history of CVA in 2017 without any deficit. She also has factor XII deficiency, history of hep C (s/p treatment with harvoni), and pHTN (RHC performed on 6/7/23 and found to have group 2 HTN. TTE and cardiac PET with preserved EF).  Her most recent creatinine is 1.4. She takes mycophenolate mofetil, prednisone, and tacrolimus for maintenance immunosuppression. Her post transplant course has been uncomplicated to date.  -Due to factor XII deficiency she received 1 unit of FFP in OR and 1 unit of FFP on POD#1. She received standard DVT prophylaxis while inpatient and was transitioned to prophylaxis lovenox x 2 weeks on discharge.     Interval HX:   Reports doing well. Has been following with Dr. Price. Her insulin was adjusted from 3 units to 5units and glucose seems better     Reports adequate Intake  UOP-no problems reported   Peripheral edema-no  Appetite-denies N/V/D; tolerating IS meds well   Reports being more active. Cooking for the whole family. Feeling stronger  BP  BP Readings from Last 3 Encounters:   07/17/24 109/61   07/08/24 139/65   06/19/24 133/87       Lab /diagnostic results reviewed with patient today.   All questions answered       Past Medical History:   Diagnosis Date    Acute respiratory failure with hypoxia and hypercapnia 05/26/2019    ATN (acute tubular necrosis) 02/08/2019    Chronic diastolic heart failure 02/07/2019    2-8-19 Mild left atrial enlargement. Mild left ventricular  enlargement. Normal left ventricular systolic function. The estimated ejection fraction is 55% Indeterminate left ventricular diastolic function. Normal right ventricular systolic function. Mild mitral regurgitation. Mild to moderate tricuspid regurgitation. The estimated PA systolic pressure is 32 mm Hg Normal central venous pressure (3 m    CKD (chronic kidney disease) stage 4, GFR 15-29 ml/min 05/16/2018    CKD stage G3a/A3, GFR 45-59 and albumin creatinine ratio >300 mg/g 05/16/2018    CKD stage G3b/A3, GFR 30-44 and albumin creatinine ratio >300 mg/g 05/16/2018    Closed compression fracture of L2 lumbar vertebra 05/24/2019    Closed compression fracture of L2 lumbar vertebra 05/24/2019     IMO Regulatory Load October 2019    Clotted dialysis access 07/22/2023    Controlled type 2 diabetes with retinopathy 04/09/2018    COVID-19 virus infection 7-1-2021 07/01/2021    CVA (cerebral vascular accident) 2017    Diabetic polyneuropathy associated with type 2 diabetes mellitus 12/04/2020    Encounter for blood transfusion     Essential hypertension 04/09/2018    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  7-31-19 Factor XI Activity 55 - 145 % 96       Gastroesophageal reflux disease without esophagitis 05/24/2019    Generalized anxiety disorder 11/08/2019    GERD (gastroesophageal reflux disease)     GIB (gastrointestinal bleeding) 10/18/2018    History of CVA (cerebrovascular accident) 11/27/2022    History of CVA (cerebrovascular accident) without residual deficits 11/27/2022    History of hepatitis C     s/p succesful Rx w/ Harvoni  - SVR12 (cure) 2016    Hypertensive retinopathy, bilateral 12/10/2019    Mild nonproliferative diabetic retinopathy 05/02/2018    Mixed hyperlipidemia 04/09/2018    Myalgia due to statin 09/07/2022    Nephrotic range proteinuria 04/09/2018    Normocytic anemia 01/14/2022    NS (nuclear sclerosis), bilateral 12/10/2019    Peanut allergy 02/04/2023    PNA (pneumonia)  "05/28/2019    Postoperative hypothyroidism     Prophylactic immunotherapy 05/24/2024    Pulmonary hypertension 02/06/2023    Renovascular hypertension 04/09/2018    S/P arteriovenous (AV) fistula creation 08/14/2019 8/14/19: L2 kyphoplasty with Dr. Kwon       S/P kidney transplant 05/24/2024    S/P kyphoplasty 08/14/2019 8/14/19: L2 kyphoplasty with Dr. Kwon     Slow transit constipation 05/27/2019    Type 2 diabetes mellitus with circulatory disorder, with long-term current use of insulin 04/09/2018    Type 2 diabetes mellitus with stage 3 chronic kidney disease, without long-term current use of insulin 04/09/2018    Type 2 diabetes mellitus with stage 3a chronic kidney disease, without long-term current use of insulin 03/03/2021    Type 2 diabetes mellitus with stage 3b chronic kidney disease, without long-term current use of insulin 09/07/2022       Review of Systems   Constitutional:  Negative for activity change and fever.   Eyes:  Positive for visual disturbance.        Wears glasses   Respiratory:  Negative for shortness of breath.    Cardiovascular:  Negative for chest pain and leg swelling.   Gastrointestinal:  Negative for constipation, diarrhea and nausea.   Genitourinary:  Negative for difficulty urinating, frequency and hematuria.   Musculoskeletal:  Negative for arthralgias and myalgias.   Allergic/Immunologic: Positive for immunocompromised state.   Neurological:  Negative for weakness and numbness.   Psychiatric/Behavioral:  Negative for sleep disturbance. The patient is not nervous/anxious.        Objective:   Blood pressure 105/65, pulse 83, temperature 97.3 °F (36.3 °C), temperature source Temporal, height 5' 9" (1.753 m), weight 102.8 kg (226 lb 10.1 oz), SpO2 99%.body mass index is 33.47 kg/m².    Physical Exam  Vitals and nursing note reviewed.   Constitutional:       Appearance: Normal appearance.   Cardiovascular:      Rate and Rhythm: Normal rate and regular rhythm.      Heart sounds: " Normal heart sounds.   Pulmonary:      Effort: Pulmonary effort is normal.      Breath sounds: Normal breath sounds.   Abdominal:      General: There is no distension.      Comments:  No bruit noted over the allograft      Musculoskeletal:         General: Normal range of motion.   Skin:     General: Skin is warm and dry.   Neurological:      Mental Status: She is alert.         Labs:  Lab Results   Component Value Date    WBC 8.37 07/29/2024    HGB 12.2 07/29/2024    HCT 37.8 07/29/2024     07/29/2024    K 3.7 07/29/2024     07/29/2024    CO2 22 (L) 07/29/2024    BUN 14 07/29/2024    CREATININE 1.4 07/29/2024    EGFRNORACEVR 42.5 (A) 07/29/2024    CALCIUM 9.3 07/29/2024    PHOS 3.3 07/29/2024    MG 1.6 07/29/2024    ALBUMIN 3.7 07/29/2024    AST 17 05/02/2024    ALT 11 05/02/2024    UTPCR Unable to calculate 07/22/2024    .7 (H) 08/10/2023    TACROLIMUS 9.7 07/29/2024       Labs were reviewed with the patient    Assessment:     1. S/P kidney transplant 5/23/24.    2. Benign hypertension with CKD (chronic kidney disease) stage III    3. Controlled type 2 diabetes mellitus with stable proliferative retinopathy of both eyes, unspecified whether long term insulin use    4. Long-term use of immunosuppressant medication        Plan:    Routine f/u with virtual visit      1. Immunosuppression: Prograf trough 9,  Continue Prograf 3/3  MMF 1000 mg BID, and Prednisone taper. Will continue to monitor for drug toxicities  Valcyte 900 mg QD for CMV prophylaxis   Atovaquone 1500 mg Qd for PCP prophylaxis       2. Allograft Function: ckd 3b, Stable. Continue good oral hydration.   - ESRD secondary to DM2 s/p living kidney transplant on 5/23/24.   - post transplant course has been uncomplicated to date.  -BL ~1.3-1.4  Lab Results   Component Value Date    CREATININE 1.4 07/29/2024      Latest Reference Range & Units POD 67,  Kidney-Post 1 Year  07/29/24 07:01   Creatinine 0.5 - 1.4 mg/dL 1.4   eGFR >60  mL/min/1.73 m^2 42.5 !   Glucose 70 - 110 mg/dL 241 (H)     DM : cont insulin as prescribed --mgmt deferred to endocrinology  Lab Results   Component Value Date    HGBA1C 5.5 05/23/2024       3. Hypertension management: advise low salt diet and home BP monitoring    Coreg, nifedipine    4. Metabolic Bone Disease/Secondary Hyperparathyroidism:  Vit D   Lab Results   Component Value Date    .7 (H) 08/10/2023    CALCIUM 9.3 07/29/2024    PHOS 3.3 07/29/2024      Latest Reference Range & Units POD 67,  Kidney-Post 1 Year  07/29/24 07:01   Magnesium  1.6 - 2.6 mg/dL 1.6         5. Electrolytes: stable. No need for intervention   Lab Results   Component Value Date     07/29/2024    K 3.7 07/29/2024     07/29/2024    CO2 22 (L) 07/29/2024       6. Anemia: stable. No need for intervention   Lab Results   Component Value Date    WBC 8.37 07/29/2024    HGB 12.2 07/29/2024    HCT 37.8 07/29/2024    MCV 86 07/29/2024     07/29/2024         7. Proteinuria: continue p/c ratio as per guidelines   [facility] [place]  7/15/24 [facility]        8. BK virus infection screening:  BK PCR per protocol   6/13/24  not detected <125    9. Weight education: provided during the clinic visit   There is no height or weight on file to calculate BMI.     10. Patient safety education regarding immunosuppression including prophylaxis posttransplant for CMV, PCP    PCP ppx: Atovaquone until 11/19/24  CMV ppx: Valcyte until 8/21/24     Follow-up:   Clinic: return to transplant clinic weekly for the first month after transplant; every 2 weeks during months 2-3; then at 6-, 9-, 12-, 18-, 24-, and 36- months post-transplant to reassess for complications from immunosuppression toxicity and monitor for rejection.  Annually thereafter.    Labs: since patient remains at high risk for rejection and drug-related complications that warrant close monitoring, labs will be ordered as follows: continue twice weekly CBC, renal panel, and drug level for first  month; then same labs once weekly through 3rd month post-transplant.  Urine for UA and protein/creatinine ratio monthly.  Serum BK - PCR at 1-, 3-, 6-, 9-, 12-, 18-, 24-, 36-, 48-, and 60 months post-transplant.  Hepatic panel at 1-, 2-, 3-, 6-, 9-, 12-, 18-, 24-, and 36- months post-transplant.    Maria Del Carmen Holland NP       Education:   Material provided to the patient.  Patient reminded to call with any health changes since these can be early signs of significant complications.  Also, I advised the patient to be sure any new medications or changes of old medications are discussed with either a pharmacist or physician knowledgeable with transplant to avoid rejection/drug toxicity related to significant drug interactions.    Patient advised that it is recommended that all transplanted patients, and their close contacts and household members receive Covid vaccination.

## 2024-07-31 ENCOUNTER — OFFICE VISIT (OUTPATIENT)
Dept: TRANSPLANT | Facility: CLINIC | Age: 64
End: 2024-07-31
Payer: MEDICARE

## 2024-07-31 VITALS
OXYGEN SATURATION: 99 % | WEIGHT: 226.63 LBS | HEIGHT: 69 IN | BODY MASS INDEX: 33.57 KG/M2 | TEMPERATURE: 97 F | SYSTOLIC BLOOD PRESSURE: 105 MMHG | HEART RATE: 83 BPM | DIASTOLIC BLOOD PRESSURE: 65 MMHG

## 2024-07-31 DIAGNOSIS — Z79.60 LONG-TERM USE OF IMMUNOSUPPRESSANT MEDICATION: ICD-10-CM

## 2024-07-31 DIAGNOSIS — E11.3553 CONTROLLED TYPE 2 DIABETES MELLITUS WITH STABLE PROLIFERATIVE RETINOPATHY OF BOTH EYES, UNSPECIFIED WHETHER LONG TERM INSULIN USE: Chronic | ICD-10-CM

## 2024-07-31 DIAGNOSIS — I12.9 BENIGN HYPERTENSION WITH CKD (CHRONIC KIDNEY DISEASE) STAGE III: ICD-10-CM

## 2024-07-31 DIAGNOSIS — Z94.0 S/P KIDNEY TRANSPLANT: Primary | ICD-10-CM

## 2024-07-31 DIAGNOSIS — N18.30 BENIGN HYPERTENSION WITH CKD (CHRONIC KIDNEY DISEASE) STAGE III: ICD-10-CM

## 2024-07-31 PROCEDURE — 1159F MED LIST DOCD IN RCRD: CPT | Mod: HCNC,CPTII,S$GLB, | Performed by: NURSE PRACTITIONER

## 2024-07-31 PROCEDURE — 3008F BODY MASS INDEX DOCD: CPT | Mod: HCNC,CPTII,S$GLB, | Performed by: NURSE PRACTITIONER

## 2024-07-31 PROCEDURE — 3074F SYST BP LT 130 MM HG: CPT | Mod: HCNC,CPTII,S$GLB, | Performed by: NURSE PRACTITIONER

## 2024-07-31 PROCEDURE — 3066F NEPHROPATHY DOC TX: CPT | Mod: HCNC,CPTII,S$GLB, | Performed by: NURSE PRACTITIONER

## 2024-07-31 PROCEDURE — 99999 PR PBB SHADOW E&M-EST. PATIENT-LVL IV: CPT | Mod: PBBFAC,HCNC,, | Performed by: NURSE PRACTITIONER

## 2024-07-31 PROCEDURE — 3044F HG A1C LEVEL LT 7.0%: CPT | Mod: HCNC,CPTII,S$GLB, | Performed by: NURSE PRACTITIONER

## 2024-07-31 PROCEDURE — 4010F ACE/ARB THERAPY RXD/TAKEN: CPT | Mod: HCNC,CPTII,S$GLB, | Performed by: NURSE PRACTITIONER

## 2024-07-31 PROCEDURE — 99215 OFFICE O/P EST HI 40 MIN: CPT | Mod: HCNC,S$GLB,, | Performed by: NURSE PRACTITIONER

## 2024-07-31 PROCEDURE — 3078F DIAST BP <80 MM HG: CPT | Mod: HCNC,CPTII,S$GLB, | Performed by: NURSE PRACTITIONER

## 2024-08-01 NOTE — SUBJECTIVE & OBJECTIVE
Interval History: Virtual follow up visit for suspected Shortness of breath [R06.02]  Hypoxia [R09.02]  Fever [R50.9] present on admission.   This service was provided by telemedicine.    Patient was transferred to [facility] on: 2/4/2023  The patient location is: [id number] A   Admitted 2/1/2023 12:25 PM    The patient is able to provide adequate history. Additional history was obtained from: chart review.  Overnight On Call Provider contacted about pruritis of feet; Atarax ordered but ineffective  Patient reports complaints of weakness. Comfortable on RA all day. SpO2 70% with ambulation on 2/5.  Symptoms are stable since yesterday.     I have reviewed the following on 2/7/2023:     Details     [x]   Lab results  H&H stable    []   Micro reports     []   Pathology reports     []   Imaging reports     []   Cardiology Procedure reports     []   Outside records/CareEverywhere     []  Independently viewed:       Review of Systems   Constitutional:  Positive for fatigue. Negative for fever.     Objective:     Vital Signs (Most Recent):  Temp: 98.5 °F (36.9 °C) (02/07/23 1202)  Pulse: 77 (02/07/23 1202)  Resp: 18 (02/07/23 1202)  BP: (!) 141/68 (02/07/23 1202)  SpO2: (!) 92 % (02/07/23 1202)   Vital Signs (24h Range):  Temp:  [98.1 °F (36.7 °C)-98.8 °F (37.1 °C)] 98.5 °F (36.9 °C)  Pulse:  [77-80] 77  Resp:  [16-18] 18  SpO2:  [87 %-96 %] 92 %  BP: (129-193)/(61-79) 141/68     Weight: 104.3 kg (230 lb)  Body mass index is 36.02 kg/m².    Intake/Output Summary (Last 24 hours) at 2/7/2023 1445  Last data filed at 2/7/2023 1300  Gross per 24 hour   Intake 720 ml   Output 700 ml   Net 20 ml        Physical Exam  Constitutional:       Appearance: She is obese.   Cardiovascular:      Comments: Monitor and/or Vital signs reviewed at time of visit  Pulmonary:      Effort: Pulmonary effort is normal. No accessory muscle usage or respiratory distress.   Neurological:      Mental Status: She is alert. She is not  disoriented.   Psychiatric:         Attention and Perception: Attention normal.         Mood and Affect: Affect normal.         Behavior: Behavior is cooperative.         Cognition and Memory: Memory is impaired.       Significant Labs:   Recent Labs   Lab 09/07/22  1047 11/17/22  0813 02/02/23  2130   HGBA1C 6.8* 5.5 5.2       Recent Labs   Lab 02/04/23  0556 02/06/23  0431 02/07/23  0853   WBC 11.90 9.57 10.87   HGB 8.6* 8.4* 9.9*   HCT 28.3* 27.6* 31.7*   * 151 197       Recent Labs   Lab 02/03/23  0424 02/04/23  0556 02/06/23  0431   GRAN 67.6  7.5 74.5*  8.9* 71.3  6.8   LYMPH 21.7  2.4 14.4*  1.7 16.8*  1.6   MONO 8.6  1.0 8.7  1.0 8.7  0.8   EOS 0.1 0.1 0.2       Recent Labs   Lab 02/04/23  0556 02/05/23  1001 02/06/23  0431 02/07/23  0853   * 135* 134* 138   K 4.4 4.1 4.1 4.1    101 101 102   CO2 16* 21* 22* 24   BUN 47* 50* 52* 46*   CREATININE 4.2* 4.3* 4.0* 4.0*   GLU 81 149* 98 103   CALCIUM 8.2* 8.2* 8.4* 8.7   ALBUMIN 3.1* 3.0* 3.0* 3.2*   MG 1.8  --   --   --    PHOS 5.5* 5.2* 4.4 3.6       Recent Labs   Lab 02/01/23  1302   ALKPHOS 69   ALT 5*   AST 22   PROT 7.4   BILITOT 0.3       Recent Labs   Lab 02/01/23  1302   TROPONINI 0.010       Recent Labs   Lab 12/19/22  1031 01/27/23  1004   FERRITIN 108 61       Results for orders placed or performed in visit on 01/27/23   Vitamin D   Result Value Ref Range    Vit D, 25-Hydroxy 43 30 - 96 ng/mL     SARS-CoV2 (COVID-19) Qualitative PCR (no units)   Date Value   02/04/2023 Not Detected   02/01/2023 Not Detected     SARS-CoV-2 RNA, Amplification, Qual (no units)   Date Value   10/23/2020 Negative     POC Rapid COVID (no units)   Date Value   07/01/2021 Positive (A)       ECG Results              EKG 12-lead (Final result)  Result time 02/01/23 15:13:56      Final result by Interface, Lab In [facility] (02/01/23 15:13:56)                   Narrative:    Test Reason : R06.02,    Vent. Rate : 076 BPM     Atrial Rate : 076 BPM     P-R  Int : 196 ms          QRS Dur : 092 ms      QT Int : 394 ms       P-R-T Axes : 051 044 042 degrees     QTc Int : 443 ms    Normal sinus rhythm  Low anterior forces  Abnormal ECG  When compared with ECG of 31-JAN-2023 13:45,  No significant change was found  Confirmed by Mark TREJO MD ([id number]) on 2/1/2023 3:13:50 PM    Referred By: [email or web address]   SELF           Confirmed By:Mark TREJO MD                                    Results for orders placed during the hospital encounter of 09/11/22    Echo    Interpretation Summary  · Severe left atrial enlargement.  · The left ventricle is normal in size with mild eccentric hypertrophy and normal systolic function.  · The estimated ejection fraction is 65%.  · Indeterminate left ventricular diastolic function.  · Normal right ventricular size with normal right ventricular systolic function.  · Mild mitral regurgitation.  · Normal central venous pressure (3 mmHg).      Echo  · Moderate left atrial enlargement.  · The left ventricle is normal in size with concentric hypertrophy and   normal systolic function.  · Left ventricular diastolic dysfunction.  · Normal right ventricular size with normal right ventricular systolic   function.  · Mild mitral regurgitation.  · Moderate tricuspid regurgitation.  · Intermediate central venous pressure (8 mmHg).  · There is pulmonary hypertension. The estimated PA systolic pressure is   58 mmHg.  · Small lateral pericardial effusion.  · The estimated PA systolic pressure is 58 mmHg.         Labs and Imaging within the last 24 hours listed above were reviewed.      Diabetes

## 2024-08-05 ENCOUNTER — TELEPHONE (OUTPATIENT)
Dept: TRANSPLANT | Facility: CLINIC | Age: 64
End: 2024-08-05
Payer: MEDICARE

## 2024-08-05 DIAGNOSIS — R73.9 HYPERGLYCEMIA: Primary | ICD-10-CM

## 2024-08-06 ENCOUNTER — OFFICE VISIT (OUTPATIENT)
Dept: OPHTHALMOLOGY | Facility: CLINIC | Age: 64
End: 2024-08-06
Payer: MEDICARE

## 2024-08-06 ENCOUNTER — CLINICAL SUPPORT (OUTPATIENT)
Dept: OPHTHALMOLOGY | Facility: CLINIC | Age: 64
End: 2024-08-06
Payer: MEDICARE

## 2024-08-06 DIAGNOSIS — Z94.0 S/P KIDNEY TRANSPLANT: ICD-10-CM

## 2024-08-06 DIAGNOSIS — E11.3553 CONTROLLED TYPE 2 DIABETES MELLITUS WITH STABLE PROLIFERATIVE RETINOPATHY OF BOTH EYES, UNSPECIFIED WHETHER LONG TERM INSULIN USE: Primary | ICD-10-CM

## 2024-08-06 DIAGNOSIS — H35.033 HYPERTENSIVE RETINOPATHY, BILATERAL: ICD-10-CM

## 2024-08-06 PROCEDURE — 4010F ACE/ARB THERAPY RXD/TAKEN: CPT | Mod: HCNC,CPTII,S$GLB, | Performed by: OPHTHALMOLOGY

## 2024-08-06 PROCEDURE — 99999 PR PBB SHADOW E&M-EST. PATIENT-LVL III: CPT | Mod: PBBFAC,HCNC,, | Performed by: OPHTHALMOLOGY

## 2024-08-06 PROCEDURE — 3044F HG A1C LEVEL LT 7.0%: CPT | Mod: HCNC,CPTII,S$GLB, | Performed by: OPHTHALMOLOGY

## 2024-08-06 PROCEDURE — 1159F MED LIST DOCD IN RCRD: CPT | Mod: HCNC,CPTII,S$GLB, | Performed by: OPHTHALMOLOGY

## 2024-08-06 PROCEDURE — 92201 OPSCPY EXTND RTA DRAW UNI/BI: CPT | Mod: HCNC,S$GLB,, | Performed by: OPHTHALMOLOGY

## 2024-08-06 PROCEDURE — 92014 COMPRE OPH EXAM EST PT 1/>: CPT | Mod: HCNC,S$GLB,, | Performed by: OPHTHALMOLOGY

## 2024-08-06 PROCEDURE — 1160F RVW MEDS BY RX/DR IN RCRD: CPT | Mod: HCNC,CPTII,S$GLB, | Performed by: OPHTHALMOLOGY

## 2024-08-06 PROCEDURE — 92134 CPTRZ OPH DX IMG PST SGM RTA: CPT | Mod: HCNC,S$GLB,, | Performed by: OPHTHALMOLOGY

## 2024-08-06 PROCEDURE — 3066F NEPHROPATHY DOC TX: CPT | Mod: HCNC,CPTII,S$GLB, | Performed by: OPHTHALMOLOGY

## 2024-08-06 RX ORDER — TACROLIMUS 1 MG/1
CAPSULE ORAL
Qty: 240 CAPSULE | Refills: 11 | Status: SHIPPED | OUTPATIENT
Start: 2024-08-06

## 2024-08-07 ENCOUNTER — HOSPITAL ENCOUNTER (EMERGENCY)
Facility: HOSPITAL | Age: 64
Discharge: HOME | End: 2024-08-07
Attending: EMERGENCY MEDICINE
Payer: MEDICARE

## 2024-08-07 VITALS
DIASTOLIC BLOOD PRESSURE: 71 MMHG | HEIGHT: 69 IN | HEART RATE: 74 BPM | BODY MASS INDEX: 33.03 KG/M2 | SYSTOLIC BLOOD PRESSURE: 144 MMHG | TEMPERATURE: 98 F | OXYGEN SATURATION: 100 % | RESPIRATION RATE: 16 BRPM | WEIGHT: 223 LBS

## 2024-08-07 DIAGNOSIS — E11.65 TYPE 2 DIABETES MELLITUS WITH HYPERGLYCEMIA, WITH LONG-TERM CURRENT USE OF INSULIN: ICD-10-CM

## 2024-08-07 DIAGNOSIS — I12.9 BENIGN HYPERTENSION WITH CKD (CHRONIC KIDNEY DISEASE) STAGE III: ICD-10-CM

## 2024-08-07 DIAGNOSIS — Z79.4 TYPE 2 DIABETES MELLITUS WITH HYPERGLYCEMIA, WITH LONG-TERM CURRENT USE OF INSULIN: ICD-10-CM

## 2024-08-07 DIAGNOSIS — Z94.0 S/P KIDNEY TRANSPLANT: ICD-10-CM

## 2024-08-07 DIAGNOSIS — E89.0 POSTOPERATIVE HYPOTHYROIDISM: Chronic | ICD-10-CM

## 2024-08-07 DIAGNOSIS — N18.30 BENIGN HYPERTENSION WITH CKD (CHRONIC KIDNEY DISEASE) STAGE III: ICD-10-CM

## 2024-08-07 DIAGNOSIS — R73.9 HYPERGLYCEMIA: ICD-10-CM

## 2024-08-07 DIAGNOSIS — E11.3553 CONTROLLED TYPE 2 DIABETES MELLITUS WITH STABLE PROLIFERATIVE RETINOPATHY OF BOTH EYES, UNSPECIFIED WHETHER LONG TERM INSULIN USE: Primary | Chronic | ICD-10-CM

## 2024-08-07 LAB
ALBUMIN SERPL BCP-MCNC: 3.8 G/DL (ref 3.5–5.2)
ALP SERPL-CCNC: 63 U/L (ref 55–135)
ALT SERPL W/O P-5'-P-CCNC: 8 U/L (ref 10–44)
ANION GAP SERPL CALC-SCNC: 10 MMOL/L (ref 8–16)
AST SERPL-CCNC: 15 U/L (ref 10–40)
B-OH-BUTYR BLD STRIP-SCNC: 0 MMOL/L (ref 0–0.5)
BASOPHILS # BLD AUTO: 0.05 K/UL (ref 0–0.2)
BASOPHILS NFR BLD: 0.5 % (ref 0–1.9)
BILIRUB SERPL-MCNC: 0.6 MG/DL (ref 0.1–1)
BUN SERPL-MCNC: 15 MG/DL (ref 8–23)
CALCIUM SERPL-MCNC: 9.5 MG/DL (ref 8.7–10.5)
CHLORIDE SERPL-SCNC: 102 MMOL/L (ref 95–110)
CO2 SERPL-SCNC: 24 MMOL/L (ref 23–29)
CREAT SERPL-MCNC: 1.5 MG/DL (ref 0.5–1.4)
DIFFERENTIAL METHOD BLD: ABNORMAL
EOSINOPHIL # BLD AUTO: 0.1 K/UL (ref 0–0.5)
EOSINOPHIL NFR BLD: 0.5 % (ref 0–8)
ERYTHROCYTE [DISTWIDTH] IN BLOOD BY AUTOMATED COUNT: 14.4 % (ref 11.5–14.5)
EST. GFR  (NO RACE VARIABLE): 39.2 ML/MIN/1.73 M^2
GLUCOSE SERPL-MCNC: 292 MG/DL (ref 70–110)
HCT VFR BLD AUTO: 37 % (ref 37–48.5)
HGB BLD-MCNC: 11.5 G/DL (ref 12–16)
IMM GRANULOCYTES # BLD AUTO: 0.06 K/UL (ref 0–0.04)
IMM GRANULOCYTES NFR BLD AUTO: 0.6 % (ref 0–0.5)
LYMPHOCYTES # BLD AUTO: 0.9 K/UL (ref 1–4.8)
LYMPHOCYTES NFR BLD: 9.9 % (ref 18–48)
MCH RBC QN AUTO: 27.3 PG (ref 27–31)
MCHC RBC AUTO-ENTMCNC: 31.1 G/DL (ref 32–36)
MCV RBC AUTO: 88 FL (ref 82–98)
MONOCYTES # BLD AUTO: 0.2 K/UL (ref 0.3–1)
MONOCYTES NFR BLD: 2 % (ref 4–15)
NEUTROPHILS # BLD AUTO: 8.1 K/UL (ref 1.8–7.7)
NEUTROPHILS NFR BLD: 86.5 % (ref 38–73)
NRBC BLD-RTO: 0 /100 WBC
PLATELET # BLD AUTO: 220 K/UL (ref 150–450)
PMV BLD AUTO: 11.3 FL (ref 9.2–12.9)
POCT GLUCOSE: 312 MG/DL (ref 70–110)
POTASSIUM SERPL-SCNC: 4.4 MMOL/L (ref 3.5–5.1)
PROT SERPL-MCNC: 7.3 G/DL (ref 6–8.4)
RBC # BLD AUTO: 4.21 M/UL (ref 4–5.4)
SODIUM SERPL-SCNC: 136 MMOL/L (ref 136–145)
WBC # BLD AUTO: 9.33 K/UL (ref 3.9–12.7)

## 2024-08-07 PROCEDURE — 82010 KETONE BODYS QUAN: CPT | Mod: HCNC | Performed by: EMERGENCY MEDICINE

## 2024-08-07 PROCEDURE — 96361 HYDRATE IV INFUSION ADD-ON: CPT | Mod: HCNC

## 2024-08-07 PROCEDURE — 99284 EMERGENCY DEPT VISIT MOD MDM: CPT | Mod: HCNC,,, | Performed by: NURSE PRACTITIONER

## 2024-08-07 PROCEDURE — 25000003 PHARM REV CODE 250: Mod: HCNC | Performed by: PHYSICIAN ASSISTANT

## 2024-08-07 PROCEDURE — 80053 COMPREHEN METABOLIC PANEL: CPT | Mod: HCNC | Performed by: EMERGENCY MEDICINE

## 2024-08-07 PROCEDURE — 96360 HYDRATION IV INFUSION INIT: CPT | Mod: HCNC

## 2024-08-07 PROCEDURE — 82962 GLUCOSE BLOOD TEST: CPT | Mod: HCNC

## 2024-08-07 PROCEDURE — 99284 EMERGENCY DEPT VISIT MOD MDM: CPT | Mod: HCNC,25

## 2024-08-07 PROCEDURE — 85025 COMPLETE CBC W/AUTO DIFF WBC: CPT | Mod: HCNC | Performed by: EMERGENCY MEDICINE

## 2024-08-07 RX ORDER — INSULIN ASPART 100 [IU]/ML
INJECTION, SOLUTION INTRAVENOUS; SUBCUTANEOUS
Qty: 6 ML | Refills: 2 | Status: SHIPPED | OUTPATIENT
Start: 2024-08-07 | End: 2024-08-12

## 2024-08-07 RX ORDER — INSULIN GLARGINE 100 [IU]/ML
15 INJECTION, SOLUTION SUBCUTANEOUS NIGHTLY
Qty: 6 ML | Refills: 2 | Status: SHIPPED | OUTPATIENT
Start: 2024-08-07 | End: 2024-08-12

## 2024-08-07 RX ADMIN — SODIUM CHLORIDE 500 ML: 9 INJECTION, SOLUTION INTRAVENOUS at 03:08

## 2024-08-07 NOTE — FIRST PROVIDER EVALUATION
Medical screening examination initiated.  I have conducted a focused provider triage encounter, findings are as follows:    Brief history of present illness:  64 Fhx of Kidney transplant May 23,2023 DM, HTN, pulm HTN here for hyperglycemia ( 370 this am)        There were no vitals filed for this visit.    Pertinent physical exam:  well appearing, no distress    Brief workup plan:  labs, poc glucose      Preliminary workup initiated; this workup will be continued and followed by the physician or advanced practice provider that is assigned to the patient when roomed.

## 2024-08-07 NOTE — ED PROVIDER NOTES
Encounter Date: 8/7/2024       History     Chief Complaint   Patient presents with    Hyperglycemia      at home patient was sent her by her kidney doctor for follow up     64-year-old female with a PMHx of ESRD secondary to T2DM (s/p kidney transplant on 05/23/2024), CVA, factor XII deficiency, hepatitis-C (s/p treatment), pulmonary hypertension, diastolic heart failure presents to ED for hyperglycemia.  Her blood sugars have been up trending over the past month.  She received a message from transplant medicine that if her blood sugar was greater than 320 she should proceed to the emergency department. Her blood sugar was greater than 300 this morning.  She feels that she can not eat or her blood sugar we will spike into the 300s. She overall feels well and denies complaints today.    The history is provided by the patient.     Review of patient's allergies indicates:   Allergen Reactions    Atorvastatin Other (See Comments)     Statin induced myalgia    Mushroom Hives    Peanut Anaphylaxis    Bactrim [sulfamethoxazole-trimethoprim] Hives    Gabapentin      Pruritis     Iodine     Peanut butter flavor     Penicillins Hives    Shellfish containing products Itching    Sulfa (sulfonamide antibiotics) Hives     Past Medical History:   Diagnosis Date    Acute respiratory failure with hypoxia and hypercapnia 05/26/2019    ATN (acute tubular necrosis) 02/08/2019    Chronic diastolic heart failure 02/07/2019    2-8-19 Mild left atrial enlargement. Mild left ventricular enlargement. Normal left ventricular systolic function. The estimated ejection fraction is 55% Indeterminate left ventricular diastolic function. Normal right ventricular systolic function. Mild mitral regurgitation. Mild to moderate tricuspid regurgitation. The estimated PA systolic pressure is 32 mm Hg Normal central venous pressure (3 m    CKD (chronic kidney disease) stage 4, GFR 15-29 ml/min 05/16/2018    CKD stage G3a/A3, GFR 45-59 and albumin  creatinine ratio >300 mg/g 05/16/2018    CKD stage G3b/A3, GFR 30-44 and albumin creatinine ratio >300 mg/g 05/16/2018    Closed compression fracture of L2 lumbar vertebra 05/24/2019    Closed compression fracture of L2 lumbar vertebra 05/24/2019     IMO Regulatory Load October 2019    Clotted dialysis access 07/22/2023    Controlled type 2 diabetes with retinopathy 04/09/2018    COVID-19 virus infection 7-1-2021 07/01/2021    CVA (cerebral vascular accident) 2017    Diabetic polyneuropathy associated with type 2 diabetes mellitus 12/04/2020    Encounter for blood transfusion     Essential hypertension 04/09/2018    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  7-31-19 Factor XI Activity 55 - 145 % 96       Gastroesophageal reflux disease without esophagitis 05/24/2019    Generalized anxiety disorder 11/08/2019    GERD (gastroesophageal reflux disease)     GIB (gastrointestinal bleeding) 10/18/2018    History of CVA (cerebrovascular accident) 11/27/2022    History of CVA (cerebrovascular accident) without residual deficits 11/27/2022    History of hepatitis C     s/p succesful Rx w/ Harv[facility]  - SVR12 (cure) 2016    Hypertensive retinopathy, bilateral 12/10/2019    Mild nonproliferative diabetic retinopathy 05/02/2018    Mixed hyperlipidemia 04/09/2018    Myalgia due to statin 09/07/2022    Nephrotic range proteinuria 04/09/2018    Normocytic anemia 01/14/2022    NS (nuclear sclerosis), bilateral 12/10/2019    Peanut allergy 02/04/2023    PNA (pneumonia) 05/28/2019    Postoperative hypothyroidism     Prophylactic immunotherapy 05/24/2024    Pulmonary hypertension 02/06/2023    Renovascular hypertension 04/09/2018    S/P arteriovenous (AV) fistula creation 08/14/2019 8/14/19: L2 kyphoplasty with Dr. Kwon       S/P kidney transplant 05/24/2024    S/P kyphoplasty 08/14/2019 8/14/19: L2 kyphoplasty with Dr. Kwon     Slow transit constipation 05/27/2019    Type 2 diabetes mellitus with circulatory  disorder, with long-term current use of insulin 04/09/2018    Type 2 diabetes mellitus with stage 3 chronic kidney disease, without long-term current use of insulin 04/09/2018    Type 2 diabetes mellitus with stage 3a chronic kidney disease, without long-term current use of insulin 03/03/2021    Type 2 diabetes mellitus with stage 3b chronic kidney disease, without long-term current use of insulin 09/07/2022     Past Surgical History:   Procedure Laterality Date    AV FISTULA PLACEMENT Left 10/27/2022    Procedure: CREATION, AV FISTULA;  Surgeon: Tirso Tavera MD;  Location: [facility] OR 2ND FLR;  Service: Peripheral Vascular;  Laterality: Left;  Brachio basilic 1st stage     BACK SURGERY  2017    CHOLECYSTECTOMY  2015    [facility]    COLONOSCOPY  11/13/2015    Dr Chino torres prep. internal hemorrhoids. diverticulosis of sigmois. NO polyps. repeat due 2025    FISTULOGRAM N/A 12/16/2022    Procedure: Fistulogram;  Surgeon: Tirso Tavera MD;  Location: [facility] CATH LAB;  Service: Peripheral Vascular;  Laterality: N/A;    FISTULOGRAM, WITH PTA Left 3/23/2023    Procedure: FISTULOGRAM, WITH PTA;  Surgeon: Tirso Tavera MD;  Location: [facility] OR 2ND FLR;  Service: Peripheral Vascular;  Laterality: Left;  LUE TRANSRADIAL CO2 FISTULOGRAM    1.9 min  22.99 mGy  4.3032 Gy.cm  200cc CO2  9ml TransRadial Solution    FISTULOGRAM, WITH PTA Left 8/15/2023    Procedure: FISTULOGRAM, WITH PTA TRANSRADIAL;  Surgeon: Tirso Tavera MD;  Location: [facility] OR [facility] FLR;  Service: Peripheral Vascular;  Laterality: Left;  Fluoro: 5.1 min, mGy: 7.95, Gycm2: 1.2626, contrast: 12ml    FIXATION KYPHOPLASTY Bilateral 08/14/2019    Procedure: L2 kyphoplasty Fluoro Globus;  Surgeon: Jeremie Kwon DO;  Location: [facility] OR;  Service: Neurosurgery;  Laterality: Bilateral;  GLOBUS MACRINA CHAUDHARIYIFAN [phone] TEXTED HER @ 10:54AM ON 7-30-19  PRE-OP-BY RN  8-7-2019    HYSTERECTOMY      KIDNEY TRANSPLANT N/A 5/23/2024    Procedure: TRANSPLANT, KIDNEY;   Surgeon: Stepan Bernardo MD;  [facility];  Service: Transplant;  Laterality: N/A;    OOPHORECTOMY  1996    one    PERCUTANEOUS TRANSLUMINAL ANGIOPLASTY OF ARTERIOVENOUS FISTULA Left 12/16/2022    Procedure: PTA, AV FISTULA;  Surgeon: Tirso Tavera MD;  Location: [facility] CATH LAB;  Service: Peripheral Vascular;  Laterality: Left;    PERCUTANEOUS TRANSLUMINAL ANGIOPLASTY OF ARTERIOVENOUS FISTULA Left 11/30/2023    Procedure: PTA, AV FISTULA;  Surgeon: Tirso Tavera MD;  Location: [facility] CATH LAB;  Service: Peripheral Vascular;  Laterality: Left;  left transradial fistulagram    RIGHT HEART CATHETERIZATION Right 6/15/2023    Procedure: INSERTION, CATHETER, RIGHT HEART;  Surgeon: Lucretia Rivas MD;  Location: [facility] CATH LAB;  Service: Cardiology;  Laterality: Right;    THYROIDECTOMY  2016    at [facility] for thyroid nodule    TONSILLECTOMY      TRANSPOSITION OF BASILIC VEIN Left 1/31/2023    Procedure: TRANSPOSITION, VEIN, BASILIC;  Surgeon: Tirso Tavera MD;  Location: [address];  Service: Peripheral Vascular;  Laterality: Left;     Family History   Problem Relation Name Age of Onset    Stroke Mother      Heart disease Mother      Hypertension Mother      Diabetes type II Mother      Coronary artery disease Mother      Hypertension Father      Prostate cancer Father      Aneurysm Brother       Social History     Tobacco Use    Smoking status: Never     Passive exposure: Past    Smokeless tobacco: Never   Substance Use Topics    Alcohol use: Not Currently    Drug use: No     Review of Systems   Constitutional:  Negative for fever.   Respiratory:  Negative for shortness of breath.    Cardiovascular:  Negative for chest pain.   Gastrointestinal:  Negative for abdominal pain, nausea and vomiting.       Physical Exam     Initial Vitals [08/07/24 1338]   BP Pulse Resp Temp SpO2   134/60 86 16 98.4 °F (36.9 °C) 99 %      MAP       --         Physical Exam    Nursing note and vitals  reviewed.  Constitutional: She appears well-developed and well-nourished. She is not diaphoretic. No distress.   HENT:   Head: Normocephalic and atraumatic.   Nose: Nose normal.   Eyes: Conjunctivae and EOM are normal.   Neck: Neck supple.   Cardiovascular:  Normal rate, regular rhythm, normal heart sounds and intact distal pulses.           Pulmonary/Chest: Breath sounds normal. No respiratory distress.   Musculoskeletal:      Cervical back: Neck supple.     Neurological: She is alert and oriented to person, place, and time. Gait normal.   Skin: No rash noted.   Psychiatric: She has a normal mood and affect. Thought content normal.         ED Course   Procedures  Labs Reviewed   CBC W/ AUTO DIFFERENTIAL - Abnormal       Result Value    WBC 9.33      RBC 4.21      Hemoglobin 11.5 (*)     Hematocrit 37.0      MCV 88      MCH 27.3      MCHC 31.1 (*)     RDW 14.4      Platelets 220      MPV 11.3      Immature Granulocytes 0.6 (*)     Gran # (ANC) 8.1 (*)     Immature Grans (Abs) 0.06 (*)     Lymph # 0.9 (*)     Mono # 0.2 (*)     Eos # 0.1      Baso # 0.05      nRBC 0      Gran % 86.5 (*)     Lymph % 9.9 (*)     Mono % 2.0 (*)     Eosinophil % 0.5      Basophil % 0.5      Differential Method Automated     COMPREHENSIVE METABOLIC PANEL - Abnormal    Sodium 136      Potassium 4.4      Chloride 102      CO2 24      Glucose 292 (*)     BUN 15      Creatinine 1.5 (*)     Calcium 9.5      Total Protein 7.3      Albumin 3.8      Total Bilirubin 0.6      Alkaline Phosphatase 63      AST 15      ALT 8 (*)     eGFR 39.2 (*)     Anion Gap 10     POCT GLUCOSE - Abnormal    POCT Glucose 312 (*)    BETA - HYDROXYBUTYRATE, SERUM    Beta-Hydroxybutyrate 0.0     URINALYSIS, REFLEX TO URINE CULTURE          Imaging Results    None          Medications   sodium chloride 0.9% bolus 500 mL 500 mL (0 mLs Intravenous Stopped 8/7/24 1803)     Medical Decision Making  64-year-old female with a PMHx of ESRD secondary to T2DM (s/p kidney  transplant on 05/23/2024), CVA, factor XII deficiency, hepatitis-C (s/p treatment), pulmonary hypertension, diastolic heart failure presents to ED for hyperglycemia. Nontoxic appearing. Hemodynamically stable. Afebrile. Exam as above. I will initiate workup and reassess.    Ddx:  Hyperglycemia, HHS, DKA, ARUNA    She was hyperglycemic today.  Normal bicarb and anion gap.  Her beta hydroxybutyrate is not elevated.  I do not suspect DKA.    Receiving IV fluids for hyperglycemia    Baseline creatinine of 1.4.  Slightly up trended to 1.5    I discussed case with transplant medicine. Transplant medicine consulted Endocrinology who evaluated patient in the emergency department.  Insulin regimen adjustments were made.   She was given follow-up on 08/12/2024 with endocrinology. She is stable at this time for discharge. Strict ED precautions given to return immediately for new, worsening, or concerning symptoms    Amount and/or Complexity of Data Reviewed  Labs:  Decision-making details documented in ED Course.    Risk  Prescription drug management.               ED Course as of 08/07/24 1828   Wed Aug 07, 2024   1507 Glucose(!): 292 [HM]   1507 CO2: 24 [HM]   1507 Anion Gap: 10 [HM]   1507 Potassium: 4.4 [HM]   1507 Beta-Hydroxybutyrate: 0.0 [HM]      ED Course User Index  [HM] Maru Esqueda PA-C                           Clinical Impression:  Final diagnoses:  [R73.9] Hyperglycemia  [E11.65, Z79.4] Type 2 diabetes mellitus with hyperglycemia, with long-term current use of insulin  [Z94.0] S/P kidney transplant 5/23/24.          ED Disposition Condition    Discharge Stable          ED Prescriptions       Medication Sig Dispense Start Date End Date Auth. Provider    insulin glargine U-100, Lantus, (LANTUS SOLOSTAR U-100 INSULIN) 100 unit/mL (3 mL) InPn pen Inject 15 Units into the skin every evening. 6 mL 8/7/2024 11/5/2024 Maru Esqueda PA-C    insulin aspart U-100 (NOVOLOG FLEXPEN U-100 INSULIN) 100 unit/mL (3  mL) InPn pen Inject 6 units with meals. Inject additional insulin based off of your blood sugar    Sliding scale:  180 - 230 + 2 unit  231- 280  + 4 units  281 - 330 + 6 units  331 - 380 + 8 units   > 380   + 10 units 6 mL 8/7/2024 -- Maru Esqueda PA-C          Follow-up Information       Follow up With Specialties Details Why Contact Info Additional Information    [facility] - Emergency Dept Emergency Medicine  If symptoms worsen [address] [facility] [id number] [phone]     Elan Price MD Internal Medicine, Hospitalist  As needed [address]  [place] [address]  [phone]       [facility] - Endo Diabetes [facility] Endocrinology Go on 8/12/2024  [address] [facility] [id number] [phone] End[facility]ology [facility] - Main Building, 6th Floor Please park in [facility] and take Clinic elevator             Maru Esqueda PA-C  08/07/24 [id number]

## 2024-08-07 NOTE — SUBJECTIVE & OBJECTIVE
Interval HPI:   Overnight events: No acute events overnight. Patient in room ED 19/19. Blood glucose worsening. BG above goal on current insulin regimen (SSI and prandial insulin). Steroid use- Prednisone 5 mg.    Renal function- Abnormal - Creatinine 1.5   Vasopressors-  None       Endocrine will continue to follow and manage insulin orders inpatient.         No diet orders on file     Eating:   NPO  Nausea: No  Hypoglycemia and intervention: No  Fever: No  TPN and/or TF: No    PMH, PSH, FH, SH updated and reviewed     ROS:  Review of Systems   Constitutional:  Negative for unexpected weight change.   Eyes:  Positive for visual disturbance.   Respiratory:  Negative for cough.    Cardiovascular:  Negative for chest pain.   Gastrointestinal:  Negative for nausea and vomiting.   Endocrine: Positive for polydipsia and polyuria.   Musculoskeletal:  Negative for back pain.   Skin:  Negative for rash.   Neurological:  Negative for syncope.   Psychiatric/Behavioral:  Negative for agitation and dysphoric mood.        Current Medications and/or Treatments Impacting Glycemic Control  Immunotherapy:    Immunosuppressants       None          Steroids:   Hormones (From admission, onward)      None          Pressors:    Autonomic Drugs (From admission, onward)      None          Hyperglycemia/Diabetes Medications:   Antihyperglycemics (From admission, onward)      None             PHYSICAL EXAMINATION:  Vitals:    08/07/24 1338   BP: 134/60   Pulse: 86   Resp: 16   Temp: 98.4 °F (36.9 °C)     Body mass index is 32.93 kg/m².     Physical Exam  Constitutional:       Appearance: She is well-developed.   HENT:      Head: Normocephalic.   Eyes:      Conjunctiva/sclera: Conjunctivae normal.   Pulmonary:      Effort: Pulmonary effort is normal.   Musculoskeletal:         General: Normal range of motion.   Skin:     General: Skin is warm.      Findings: No rash.   Neurological:      Mental Status: She is alert and oriented to person,  place, and time.

## 2024-08-07 NOTE — HPI
Reason for Consult: Management of T2DM, Hyperglycemia      Surgical Procedure and Date: kidney transplant 5/23/24     Diabetes diagnosis year: 2022      Home Diabetes Medications: Novolog 3 units with meals + SSI (recently increased to Novolog 5 units with meals + SSI)     How often checking glucose at home? 200-300's (recently)    Diabetes Complications include:     Hyperglycemia     Complicating diabetes co morbidities:   Glucocorticoid use         HPI: Notified via kidney transplant team that the patient was admitted with BG excursions in the 300's. Patient was communicating with staff via [employer]Peer up until July 5th. BG excursions likely due to improved renal function unmasking previous insulin resiance. Patient is a 64 y.o. female with hx of T2DM w/ retinopathy, HTN, remote hx of CVA, pulmonary HTN, asymptoamatic CAD, factor XII deficiency, obesity and ESRD on HD who presents to the ED s/p kidney transplant on 5/23/24. Endocrine consulted to provide D/C recs since patient's Bg has been very high.     Lab Results   Component Value Date    HGBA1C 5.5 05/23/2024

## 2024-08-07 NOTE — ASSESSMENT & PLAN NOTE
Managed per primary team  Avoid hypoglycemia  Uncontrolled HTN can worsen insulin resistance.

## 2024-08-07 NOTE — DISCHARGE INSTRUCTIONS
Take Lantus 15 units nightly  Novolog 6 units with meals + SSI       - 180 - 230 + 2 unit  -  231- 280  + 4 units  -  281 - 330 + 6 units  -  331 - 380 + 8 units  - > 380   + 10 units  - Send logs in 3 days for review  - Sample CGM provided Malissa 3.   - You have a follow up appointment on Monday 8/12 with endocrinology. Follow up as discussed     Strict ED precautions given to return immediately for new, worsening, or concerning symptoms

## 2024-08-07 NOTE — CONSULTS
[facility] - Emergency Dept  Endocrinology  Diabetes Consult Note    Consult Requested by: Varun Richard MD   Reason for admit: <principal problem not specified>    HISTORY OF PRESENT ILLNESS:  Reason for Consult: Management of T2DM, Hyperglycemia      Surgical Procedure and Date: kidney transplant 5/23/24     Diabetes diagnosis year: 2022      Home Diabetes Medications: Novolog 3 units with meals + SSI (recently increased to Novolog 5 units with meals + SSI)     How often checking glucose at home? 200-300's (recently)    Diabetes Complications include:     Hyperglycemia     Complicating diabetes co morbidities:   Glucocorticoid use         HPI: Notified via kidney transplant team that the patient was admitted with BG excursions in the 300's. Patient was communicating with staff via [employer] up until July 5th. BG excursions likely due to improved renal function unmasking previous insulin resiance. Patient is a 64 y.o. female with hx of T2DM w/ retinopathy, HTN, remote hx of CVA, pulmonary HTN, asymptoamatic CAD, factor XII deficiency, obesity and ESRD on HD who presents to the ED s/p kidney transplant on 5/23/24. Endocrine consulted to provide D/C recs since patient's Bg has been very high.     Lab Results   Component Value Date    HGBA1C 5.5 05/23/2024         Interval HPI:   Overnight events: No acute events overnight. Patient in room ED 19/19. Blood glucose worsening. BG above goal on current insulin regimen (SSI and prandial insulin). Steroid use- Prednisone 5 mg.    Renal function- Abnormal - Creatinine 1.5   Vasopressors-  None       Endocrine will continue to follow and manage insulin orders inpatient.         No diet orders on file     Eating:   NPO  Nausea: No  Hypoglycemia and intervention: No  Fever: No  TPN and/or TF: No    PMH, PSH, FH, SH updated and reviewed     ROS:  Review of Systems   Constitutional:  Negative for unexpected weight change.   Eyes:  Positive for visual disturbance.  "  Respiratory:  Negative for cough.    Cardiovascular:  Negative for chest pain.   Gastrointestinal:  Negative for nausea and vomiting.   Endocrine: Positive for polydipsia and polyuria.   Musculoskeletal:  Negative for back pain.   Skin:  Negative for rash.   Neurological:  Negative for syncope.   Psychiatric/Behavioral:  Negative for agitation and dysphoric mood.        Current Medications and/or Treatments Impacting Glycemic Control  Immunotherapy:    Immunosuppressants       None          Steroids:   Hormones (From admission, onward)      None          Pressors:    Autonomic Drugs (From admission, onward)      None          Hyperglycemia/Diabetes Medications:   Antihyperglycemics (From admission, onward)      None             PHYSICAL EXAMINATION:  Vitals:    08/07/24 1338   BP: 134/60   Pulse: 86   Resp: 16   Temp: 98.4 °F (36.9 °C)     Body mass index is 32.93 kg/m².     Physical Exam  Constitutional:       Appearance: She is well-developed.   HENT:      Head: Normocephalic.   Eyes:      Conjunctiva/sclera: Conjunctivae normal.   Pulmonary:      Effort: Pulmonary effort is normal.   Musculoskeletal:         General: Normal range of motion.   Skin:     General: Skin is warm.      Findings: No rash.   Neurological:      Mental Status: She is alert and oriented to person, place, and time.            Labs Reviewed and Include   Recent Labs   Lab 08/07/24  1421   *   CALCIUM 9.5   ALBUMIN 3.8   PROT 7.3      K 4.4   CO2 24      BUN 15   CREATININE 1.5*   ALKPHOS 63   ALT 8*   AST 15   BILITOT 0.6     Lab Results   Component Value Date    WBC 9.33 08/07/2024    HGB 11.5 (L) 08/07/2024    HCT 37.0 08/07/2024    MCV 88 08/07/2024     08/07/2024     No results for input(s): "TSH", "FREET4" in the last 168 hours.  Lab Results   Component Value Date    HGBA1C 5.5 05/23/2024       Nutritional status:   Body mass index is 32.93 kg/m².  Lab Results   Component Value Date    ALBUMIN 3.8 08/07/2024 " "   ALBUMIN 3.5 08/05/2024    ALBUMIN 3.7 07/29/2024     No results found for: "PREALBUMIN"    Estimated Creatinine Clearance: 49.2 mL/min (A) (based on SCr of 1.5 mg/dL (H)).    Accu-Checks  Recent Labs     08/07/24  1338   POCTGLUCOSE 312*        ASSESSMENT and PLAN    Cardiac/Vascular  Benign hypertension with CKD (chronic kidney disease) stage III  Managed per primary team  Avoid hypoglycemia  Uncontrolled HTN can worsen insulin resistance.         Endocrine  Postoperative hypothyroidism  Lab Results   Component Value Date    TSH <0.010 (L) 07/01/2024    FREET4 1.57 (H) 07/01/2024       Patient on levothyroxine  150 mcg.    Controlled type 2 diabetes mellitus with stable proliferative retinopathy of both eyes, unspecified whether long term insulin use  Endocrinology consulted for BG management.   BG goal 140-180    WBD 0.3 units/kg/day  - Lantus (Insulin Glargine) 15 units nightly   - Novolog (Insulin Aspart) 6 units TIDWM and prn for BG excursions MDC SSI (180/25)  - BG checks AC/HS  - Hypoglycemia protocol in place      ** Please notify Endocrine for any change and/or advance in diet**  ** Please call Endocrine for any BG related issues **    Discharge Planning:   - Lantus 15 units nightly  - Novolog 6 units with meals + SSI  180 - 230 + 2 unit  231- 280  + 4 units  281 - 330 + 6 units  331 - 380 + 8 units      > 380   + 10 units  - Send logs in 3 days for review  - Sample CGM provided Malissa 3.   - Patient F/U scheduled for Monday 8/12            Plan discussed with patient, family, and RN at bedside.        Chente Christopher, DNP, FNP  Endocrinology  [facility] - Emergency Dept  "

## 2024-08-07 NOTE — ASSESSMENT & PLAN NOTE
Endocrinology consulted for BG management.   BG goal 140-180    WBD 0.3 units/kg/day  - Lantus (Insulin Glargine) 15 units nightly   - Novolog (Insulin Aspart) 6 units TIDWM and prn for BG excursions MDC SSI (180/25)  - BG checks AC/HS  - Hypoglycemia protocol in place      ** Please notify Endocrine for any change and/or advance in diet**  ** Please call Endocrine for any BG related issues **    Discharge Planning:   - Lantus 15 units nightly  - Novolog 6 units with meals + SSI  180 - 230 + 2 unit  231- 280  + 4 units  281 - 330 + 6 units  331 - 380 + 8 units      > 380   + 10 units  - Send logs in 3 days for review  - Sample CGM provided Malissa 3.   - Patient F/U scheduled for Monday 8/12

## 2024-08-07 NOTE — ED TRIAGE NOTES
Pt reports elevated BG xweeks and HA. Denies abdominal pain, SOB, c/p. Reports hx of kidney transplant in may.      LOC: The patient is awake, alert and aware of environment with an appropriate affect, the patient is oriented x 3 and speaking appropriately.  APPEARANCE: Patient in no acute distress, patient is clean and well groomed, patient's clothing is properly fastened.  SKIN: The skin is warm and dry, color consistent with ethnicity, patient has normal skin turgor and moist mucus membranes, skin intact, no breakdown or bruising noted.  MUSCULOSKELETAL: Patient moving all extremities spontaneously, no obvious swelling or deformities noted.  RESPIRATORY: Airway is open and patent, respirations are spontaneous, patient has a normal effort and rate, no accessory muscle use noted,  CARDIAC: Patient has a normal rate and regular rhythm, no periphreal edema noted, capillary refill < 3 seconds.  ABDOMEN: Soft and non tender to palpation, no distention noted,   NEUROLOGIC:  facial expression is symmetrical, patient moving all extremities spontaneously, normal sensation in all extremities when touched with a finger.  Follows all commands appropriately.

## 2024-08-07 NOTE — HPI
Ms. Quezada is a 64 y.o.  female with history of ESRD secondary to DM2 who received a living kidney transplant on 5/23/24. She has a history of CVA in 2017 without any deficit. She also has factor XII deficiency, history of hep C (s/p treatment with harvoni), and pHTN (RHC performed on 6/7/23 and found to have group 2 HTN. TTE and cardiac PET with preserved EF).  . She takes mycophenolate mofetil, prednisone, and tacrolimus for maintenance immunosuppression. Her post transplant course has been uncomplicated to date.Due to factor XII deficiency she received 1 unit of FFP in OR and 1 unit of FFP on POD#1. She received standard DVT prophylaxis while inpatient and was transitioned to prophylaxis lovenox x 2 weeks on discharge. She presented to the ED with complaints of hyperglycemia. She reports her blood sugar this AM prior to eating was 370. She has been taking 5 units with meals (recently adjusted outpatient from 3 units). She reports feeling fine.

## 2024-08-07 NOTE — ASSESSMENT & PLAN NOTE
Lab Results   Component Value Date    TSH <0.010 (L) 07/01/2024    FREET4 1.57 (H) 07/01/2024       Patient on levothyroxine  150 mcg.

## 2024-08-08 ENCOUNTER — PATIENT MESSAGE (OUTPATIENT)
Dept: ENDOCRINOLOGY | Facility: HOSPITAL | Age: 64
End: 2024-08-08
Payer: MEDICARE

## 2024-08-09 ENCOUNTER — PATIENT OUTREACH (OUTPATIENT)
Dept: EMERGENCY MEDICINE | Facility: HOSPITAL | Age: 64
End: 2024-08-09
Payer: MEDICARE

## 2024-08-12 ENCOUNTER — OFFICE VISIT (OUTPATIENT)
Dept: ENDOCRINOLOGY | Facility: CLINIC | Age: 64
End: 2024-08-12
Payer: MEDICARE

## 2024-08-12 DIAGNOSIS — Z94.0 S/P KIDNEY TRANSPLANT: ICD-10-CM

## 2024-08-12 DIAGNOSIS — Z29.89 PROPHYLACTIC IMMUNOTHERAPY: ICD-10-CM

## 2024-08-12 DIAGNOSIS — N18.30 BENIGN HYPERTENSION WITH CKD (CHRONIC KIDNEY DISEASE) STAGE III: ICD-10-CM

## 2024-08-12 DIAGNOSIS — I12.9 BENIGN HYPERTENSION WITH CKD (CHRONIC KIDNEY DISEASE) STAGE III: ICD-10-CM

## 2024-08-12 DIAGNOSIS — E11.3553 CONTROLLED TYPE 2 DIABETES MELLITUS WITH STABLE PROLIFERATIVE RETINOPATHY OF BOTH EYES, UNSPECIFIED WHETHER LONG TERM INSULIN USE: Primary | Chronic | ICD-10-CM

## 2024-08-12 PROCEDURE — 99213 OFFICE O/P EST LOW 20 MIN: CPT | Mod: HCNC,95,, | Performed by: INTERNAL MEDICINE

## 2024-08-12 PROCEDURE — 3044F HG A1C LEVEL LT 7.0%: CPT | Mod: HCNC,CPTII,95, | Performed by: INTERNAL MEDICINE

## 2024-08-12 PROCEDURE — 4010F ACE/ARB THERAPY RXD/TAKEN: CPT | Mod: HCNC,CPTII,95, | Performed by: INTERNAL MEDICINE

## 2024-08-12 PROCEDURE — 3066F NEPHROPATHY DOC TX: CPT | Mod: HCNC,CPTII,95, | Performed by: INTERNAL MEDICINE

## 2024-08-12 RX ORDER — INSULIN ASPART 100 [IU]/ML
INJECTION, SOLUTION INTRAVENOUS; SUBCUTANEOUS
Qty: 6 ML | Refills: 2 | Status: SHIPPED | OUTPATIENT
Start: 2024-08-12

## 2024-08-12 RX ORDER — INSULIN GLARGINE 100 [IU]/ML
18 INJECTION, SOLUTION SUBCUTANEOUS NIGHTLY
Qty: 5.4 ML | Refills: 2 | Status: SHIPPED | OUTPATIENT
Start: 2024-08-12 | End: 2024-11-10

## 2024-08-12 RX ORDER — BLOOD-GLUCOSE SENSOR
2 EACH MISCELLANEOUS
Qty: 2 EACH | Refills: 6 | Status: SHIPPED | OUTPATIENT
Start: 2024-08-12 | End: 2025-08-12

## 2024-08-12 NOTE — PROGRESS NOTES
Subjective:      Patient ID: Kathie Quezada is a 64 y.o. female.    Chief Complaint:    No chief complaint on file.    The attending portion of this evaluation, treatment, and documentation was performed per Chente Christopher DNP, FNP via Telemedicine AudioVisual using the secure [employer] software platform with two-way audio/video. The provider was located off-site and the patient is located in the hospital. The aforementioned video software was utilized to document the relevant history and physical exam.    History of Present Illness  This is a follow up visit after recent hospitalization  Endocrinology was consulted for management of hyperglycemia. Consult was documented as follow:      HISTORY OF PRESENT ILLNESS:  Reason for Consult: Management of T2DM, Hyperglycemia      Surgical Procedure and Date: kidney transplant 5/23/24     Diabetes diagnosis year: 2022      Home Diabetes Medications: Novolog 3 units with meals + SSI (recently increased to Novolog 5 units with meals + SSI)     How often checking glucose at home? 200-300's (recently)     Diabetes Complications include:     Hyperglycemia     Complicating diabetes co morbidities:   Glucocorticoid use         HPI: Notified via kidney transplant team that the patient was admitted with BG excursions in the 300's. Patient was communicating with staff via [employer] up until July 5th. BG excursions likely due to improved renal function unmasking previous insulin resiance. Patient is a 64 y.o. female with hx of T2DM w/ retinopathy, HTN, remote hx of CVA, pulmonary HTN, asymptoamatic CAD, factor XII deficiency, obesity and ESRD on HD who presents to the ED s/p kidney transplant on 5/23/24. Endocrine consulted to provide D/C recs since patient's Bg has been very high.       Controlled type 2 diabetes mellitus with stable proliferative retinopathy of both eyes, unspecified whether long term insulin use  Endocrinology consulted for BG management.   BG goal 140-180      WBD 0.3 units/kg/day  - Lantus (Insulin Glargine) 15 units nightly   - Novolog (Insulin Aspart) 6 units TIDWM and prn for BG excursions MDC SSI (180/25)  - BG checks AC/HS  - Hypoglycemia protocol in place        ** Please notify Endocrine for any change and/or advance in diet**  ** Please call Endocrine for any BG related issues **     Discharge Planning:   - Lantus 15 units nightly  - Novolog 6 units with meals + SSI  180 - 230 + 2 unit  231- 280  + 4 units  281 - 330 + 6 units  331 - 380 + 8 units      > 380   + 10 units  - Send logs in 3 days for review  - Sample CGM provided Malissa 3.   - Patient F/U scheduled for Monday 8/12      Etiology of the hyperglycemia: known T2DM   Discharge Date: 08/07/2024  Home Glucocorticoid Regimen: Prednisone 5 g  Discharge DM Regimen:  - Lantus 15 units nightly  - Novolog 6 units with meals + SSI  180 - 230 + 2 unit  231- 280  + 4 units  281 - 330 + 6 units  331 - 380 + 8 units      > 380   + 10 units  - Send logs in 3 days for review  - Sample CGM provided Malissa 3.   - Patient F/U scheduled for Monday 8/12  Was able to fill prescription for medications and supplies.     Missed doses?        # times a day testing > 4 times per day Malissa 3  Log reviewed: yes - see picture  140's per breakfast   225's around lunch  180-200 around dinner     Hypoglycemic event at home? None    Typical meals at home:   Breakfast- boiled egg and toast/ water  Lunch - grilled cheese, salad  Dinner - protein and a vegetable   Snacks - Candy jade crackers, jolly ranchers. Cake         With regards to reason for admit:  Doing better       Review of Systems   Constitutional:  Negative for unexpected weight change.   Eyes:  Positive for visual disturbance.   Respiratory:  Negative for cough.    Cardiovascular:  Negative for chest pain.   Gastrointestinal:  Negative for nausea and vomiting.   Endocrine: Negative for polydipsia and polyuria.   Musculoskeletal:  Negative for back pain.   Skin:  Negative  for rash.   Neurological:  Negative for syncope.   Psychiatric/Behavioral:  Negative for agitation and dysphoric mood.          Objective:   Physical Exam  Deferred due to virtual visit.   There is no height or weight on file to calculate BMI.    Lab Review:   Lab Results   Component Value Date    HGBA1C 5.5 05/23/2024     Lab Results   Component Value Date    CHOL 176 05/02/2024    HDL 60 05/02/2024    LDLCALC 90.6 05/02/2024    TRIG 127 05/02/2024    CHOLHDL 34.1 05/02/2024     Lab Results   Component Value Date     08/12/2024    K 3.9 08/12/2024     08/12/2024    CO2 24 08/12/2024     (H) 08/12/2024    BUN 14 08/12/2024    CREATININE 1.3 08/12/2024    CALCIUM 9.3 08/12/2024    PROT 7.3 08/07/2024    ALBUMIN 3.7 08/12/2024    BILITOT 0.6 08/07/2024    ALKPHOS 63 08/07/2024    AST 15 08/07/2024    ALT 8 (L) 08/07/2024    ANIONGAP 9 08/12/2024    ESTGFRAFRICA 15.0 (A) 07/30/2022    EGFRNONAA 13.0 (A) 07/30/2022    TSH <0.010 (L) 07/01/2024       Assessment and Plan   Reviewed goals of therapy are to get the best control we can without hypoglycemia      Reviewed patient's current insulin regimen. Clarified proper insulin dose and timing in relation to meals, etc. Insulin injection sites and proper rotation instructed.       -Advised frequent self blood glucose monitoring.  Patient encouraged to document glucose results and bring them to every clinic visit      -Hypoglycemia precautions discussed. Instructed on precautions before driving.      -Close adherence to lifestyle changes recommended.      -Periodic follow ups for eye evaluations, foot care and dental care suggested.      Problem List Items Addressed This Visit          Cardiac/Vascular    Benign hypertension with CKD (chronic kidney disease) stage III    Current Assessment & Plan     On a Nifedipine   Uncontrolled HTN can worsen insulin resistance.               Renal/    S/P kidney transplant 5/23/24.    Overview     living kidney  transplant on 5/23/24.          Current Assessment & Plan     Titrate insulin slowly to avoid hypoglycemia as the risk of hypoglycemia increases with decreased creatinine clearance.  CrCl cannot be calculated (Unknown ideal weight.).              Immunology/Multi System    Prophylactic immunotherapy    Current Assessment & Plan     On stable dosage of prednisone 5 mg.    Instructed patient to notify us if the transplant team increase prednisone dosage.             Endocrine    Controlled type 2 diabetes mellitus with stable proliferative retinopathy of both eyes, unspecified whether long term insulin use - Primary (Chronic)    Current Assessment & Plan     - Lantus 18 units nightly (20% increase due to fasting blood glucose above goal)   - Novolog 8 units with meals + SSI (20% increase due to prandial blood glucose  above goal)   180 - 230 + 2 unit  231- 280  + 4 units  281 - 330 + 6 units  331 - 380 + 8 units      > 380   + 10 units  - CGM          Relevant Medications    insulin glargine U-100, Lantus, (LANTUS SOLOSTAR U-100 INSULIN) 100 unit/mL (3 mL) InPn pen    insulin aspart U-100 (NOVOLOG FLEXPEN U-100 INSULIN) 100 unit/mL (3 mL) InPn pen    blood-glucose sensor (FREESTYLE LIS 3 SENSOR) Evelyn

## 2024-08-12 NOTE — ASSESSMENT & PLAN NOTE
On stable dosage of prednisone 5 mg.    Instructed patient to notify us if the transplant team increase prednisone dosage.

## 2024-08-12 NOTE — ASSESSMENT & PLAN NOTE
- Lantus 18 units nightly (20% increase due to fasting blood glucose above goal)   - Novolog 8 units with meals + SSI (20% increase due to prandial blood glucose  above goal)   180 - 230 + 2 unit  231- 280  + 4 units  281 - 330 + 6 units  331 - 380 + 8 units      > 380   + 10 units  - CGM

## 2024-08-12 NOTE — ASSESSMENT & PLAN NOTE
Titrate insulin slowly to avoid hypoglycemia as the risk of hypoglycemia increases with decreased creatinine clearance.  CrCl cannot be calculated (Unknown ideal weight.).

## 2024-08-12 NOTE — PROGRESS NOTES
Kidney Post-Transplant Assessment    Referring Physician: Fred Kelly  Current Nephrologist: Fred Kelly    ORGAN: LEFT KIDNEY  Donor Type: living  PHS Increased Risk: no  Cold Ischemia: 60 mins  Induction Medications:     Subjective:   The patient location is: home  The chief complaint leading to consultation is: post kidney transplant and immunosuppression management     Visit type: audiovisual    Face to Face time with patient: 20 minutes of total time spent on the encounter, which includes face to face time and non-face to face time preparing to see the patient (eg, review of tests), Obtaining and/or reviewing separately obtained history, Documenting clinical information in the electronic or other health record, Independently interpreting results (not separately reported) and communicating results to the patient/family/caregiver, or Care coordination (not separately reported).     Each patient to whom he or she provides medical services by telemedicine is:  (1) informed of the relationship between the physician and patient and the respective role of any other health care provider with respect to management of the patient; and (2) notified that he or she may decline to receive medical services by telemedicine and may withdraw from such care at any time.      CC:  Reassessment of renal allograft function and management of chronic immunosuppression.    HPI:  Ms. Quezada is a 64 y.o. year old Black or  female with history of ESRD secondary to DM2 who received a living kidney transplant on 5/23/24. She has a history of CVA in 2017 without any deficit. She also has factor XII deficiency, history of hep C (s/p treatment with harvoni), and pHTN (RHC performed on 6/7/23 and found to have group 2 HTN. TTE and cardiac PET with preserved EF).  Her most recent creatinine is 1.4. She takes mycophenolate mofetil, prednisone, and tacrolimus for maintenance immunosuppression. Her post transplant course  has been uncomplicated to date.  -Due to factor XII deficiency she received 1 unit of FFP in OR and 1 unit of FFP on POD#1. She received standard DVT prophylaxis while inpatient and was transitioned to prophylaxis lovenox x 2 weeks on discharge.     Interval HX:   Reports doing well. Recently went to ED for higher blood sugar. Medication adjusted now better controlled.     Reports adequate Intake  UOP-no problems reported   Peripheral edema-no  Appetite-denies N/V/D; tolerating IS meds well   Reports being more active. Cooking for the whole family. Feeling stronger. Hosted a baby shower over the weekend  BP  BP Readings from Last 3 Encounters:   08/07/24 (!) 144/71   07/31/24 105/65   07/17/24 109/61   Home BPs 140/80 high    Lab /diagnostic results reviewed with patient today.   All questions answered       Past Medical History:   Diagnosis Date    Acute respiratory failure with hypoxia and hypercapnia 05/26/2019    ATN (acute tubular necrosis) 02/08/2019    Chronic diastolic heart failure 02/07/2019    2-8-19 Mild left atrial enlargement. Mild left ventricular enlargement. Normal left ventricular systolic function. The estimated ejection fraction is 55% Indeterminate left ventricular diastolic function. Normal right ventricular systolic function. Mild mitral regurgitation. Mild to moderate tricuspid regurgitation. The estimated PA systolic pressure is 32 mm Hg Normal central venous pressure (3 m    CKD (chronic kidney disease) stage 4, GFR 15-29 ml/min 05/16/2018    CKD stage G3a/A3, GFR 45-59 and albumin creatinine ratio >300 mg/g 05/16/2018    CKD stage G3b/A3, GFR 30-44 and albumin creatinine ratio >300 mg/g 05/16/2018    Closed compression fracture of L2 lumbar vertebra 05/24/2019    Closed compression fracture of L2 lumbar vertebra 05/24/2019     IMO Regulatory Load October 2019    Clotted dialysis access 07/22/2023    Controlled type 2 diabetes with retinopathy 04/09/2018    COVID-19 virus infection 7-1-2021  07/01/2021    CVA (cerebral vascular accident) 2017    Diabetic polyneuropathy associated with type 2 diabetes mellitus 12/04/2020    Encounter for blood transfusion     Essential hypertension 04/09/2018    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  7-31-19 Factor XI Activity 55 - 145 % 96       Gastroesophageal reflux disease without esophagitis 05/24/2019    Generalized anxiety disorder 11/08/2019    GERD (gastroesophageal reflux disease)     GIB (gastrointestinal bleeding) 10/18/2018    History of CVA (cerebrovascular accident) 11/27/2022    History of CVA (cerebrovascular accident) without residual deficits 11/27/2022    History of hepatitis C     s/p succesful Rx w/ Harvoni  - SVR12 (cure) 2016    Hypertensive retinopathy, bilateral 12/10/2019    Mild nonproliferative diabetic retinopathy 05/02/2018    Mixed hyperlipidemia 04/09/2018    Myalgia due to statin 09/07/2022    Nephrotic range proteinuria 04/09/2018    Normocytic anemia 01/14/2022    NS (nuclear sclerosis), bilateral 12/10/2019    Peanut allergy 02/04/2023    PNA (pneumonia) 05/28/2019    Postoperative hypothyroidism     Prophylactic immunotherapy 05/24/2024    Pulmonary hypertension 02/06/2023    Renovascular hypertension 04/09/2018    S/P arteriovenous (AV) fistula creation 08/14/2019 8/14/19: L2 kyphoplasty with Dr. Kwon       S/P kidney transplant 05/24/2024    S/P kyphoplasty 08/14/2019 8/14/19: L2 kyphoplasty with Dr. Kwon     Slow transit constipation 05/27/2019    Type 2 diabetes mellitus with circulatory disorder, with long-term current use of insulin 04/09/2018    Type 2 diabetes mellitus with stage 3 chronic kidney disease, without long-term current use of insulin 04/09/2018    Type 2 diabetes mellitus with stage 3a chronic kidney disease, without long-term current use of insulin 03/03/2021    Type 2 diabetes mellitus with stage 3b chronic kidney disease, without long-term current use of insulin 09/07/2022        Review of Systems   Constitutional:  Negative for activity change and fever.   Eyes:  Positive for visual disturbance.        Wears glasses   Respiratory:  Negative for shortness of breath.    Cardiovascular:  Negative for chest pain and leg swelling.   Gastrointestinal:  Negative for constipation, diarrhea and nausea.   Genitourinary:  Negative for difficulty urinating, frequency and hematuria.   Musculoskeletal:  Negative for arthralgias and myalgias.   Allergic/Immunologic: Positive for immunocompromised state.   Neurological:  Negative for weakness and numbness.   Psychiatric/Behavioral:  Negative for sleep disturbance. The patient is not nervous/anxious.        Objective:   There were no vitals taken for this visit.body mass index is unknown because there is no height or weight on file.    Physical Exam    Labs:  Lab Results   Component Value Date    WBC 7.56 08/12/2024    HGB 12.0 08/12/2024    HCT 37.7 08/12/2024     08/12/2024    K 3.9 08/12/2024     08/12/2024    CO2 24 08/12/2024    BUN 14 08/12/2024    CREATININE 1.3 08/12/2024    EGFRNORACEVR 46.5 (A) 08/12/2024    CALCIUM 9.3 08/12/2024    PHOS 3.7 08/12/2024    MG 1.8 08/12/2024    ALBUMIN 3.7 08/12/2024    AST 15 08/07/2024    ALT 8 (L) 08/07/2024    UTPCR Unable to calculate 07/22/2024    .7 (H) 08/10/2023    TACROLIMUS 9.9 08/05/2024       Labs were reviewed with the patient    Assessment:     1. S/P kidney transplant 5/23/24.    2. Benign hypertension with CKD (chronic kidney disease) stage III    3. Type 2 diabetes mellitus with stage 3b chronic kidney disease, with long-term current use of insulin    4. Long-term use of immunosuppressant medication        Plan:   Routine f/u with virtual visit  Valcyte to stop 8/21/24       1. Immunosuppression: Prograf trough 9,  Continue Prograf 3/2  MMF 1000 mg BID, and Prednisone taper. Will continue to monitor for drug toxicities  Valcyte 900 mg QD for CMV prophylaxis   Atovaquone  1500 mg Qd for PCP prophylaxis       2. Allograft Function: ckd 3b, Stable. Continue good oral hydration.   - ESRD secondary to DM2 s/p living kidney transplant on 5/23/24.   - post transplant course has been uncomplicated to date.  -BL ~1.3-1.4  Lab Results   Component Value Date    CREATININE 1.3 08/12/2024      Latest Reference Range & Units POD 81,  Kidney-Post 1 Year  08/12/24 07:45   Creatinine 0.5 - 1.4 mg/dL 1.3   eGFR >60 mL/min/1.73 m^2 46.5 !   Glucose 70 - 110 mg/dL 170 (H)       DM : cont insulin as prescribed --mgmt deferred to endocrinology  Lab Results   Component Value Date    HGBA1C 5.5 05/23/2024       3. Hypertension management: advise low salt diet and home BP monitoring    Coreg, nifedipine    4. Metabolic Bone Disease/Secondary Hyperparathyroidism:  Vit D   Lab Results   Component Value Date    .7 (H) 08/10/2023    CALCIUM 9.3 08/12/2024    PHOS 3.7 08/12/2024      Latest Reference Range & Units POD 81,  Kidney-Post 1 Year  08/12/24 07:45   Magnesium  1.6 - 2.6 mg/dL 1.8         5. Electrolytes: stable. No need for intervention   Lab Results   Component Value Date     08/12/2024    K 3.9 08/12/2024     08/12/2024    CO2 24 08/12/2024       6. Anemia: stable. No need for intervention   Lab Results   Component Value Date    WBC 7.56 08/12/2024    HGB 12.0 08/12/2024    HCT 37.7 08/12/2024    MCV 87 08/12/2024     08/12/2024         7. Proteinuria: continue p/c ratio as per guidelines   [facility] [place]  7/22/24 [facility]        8. BK virus infection screening:  BK PCR per protocol   6/13/24  not detected <125    9. Weight education: provided during the clinic visit   There is no height or weight on file to calculate BMI.     10. Patient safety education regarding immunosuppression including prophylaxis posttransplant for CMV, PCP    PCP ppx: Atovaquone until 11/19/24  CMV ppx: Valcyte until 8/21/24     Follow-up:   Clinic: return to transplant clinic weekly for the first month after  transplant; every 2 weeks during months 2-3; then at 6-, 9-, 12-, 18-, 24-, and 36- months post-transplant to reassess for complications from immunosuppression toxicity and monitor for rejection.  Annually thereafter.    Labs: since patient remains at high risk for rejection and drug-related complications that warrant close monitoring, labs will be ordered as follows: continue twice weekly CBC, renal panel, and drug level for first month; then same labs once weekly through 3rd month post-transplant.  Urine for UA and protein/creatinine ratio monthly.  Serum BK - PCR at 1-, 3-, 6-, 9-, 12-, 18-, 24-, 36-, 48-, and 60 months post-transplant.  Hepatic panel at 1-, 2-, 3-, 6-, 9-, 12-, 18-, 24-, and 36- months post-transplant.    Maria Del Carmen Holland NP       Education:   Material provided to the patient.  Patient reminded to call with any health changes since these can be early signs of significant complications.  Also, I advised the patient to be sure any new medications or changes of old medications are discussed with either a pharmacist or physician knowledgeable with transplant to avoid rejection/drug toxicity related to significant drug interactions.    Patient advised that it is recommended that all transplanted patients, and their close contacts and household members receive Covid vaccination.

## 2024-08-14 ENCOUNTER — PATIENT MESSAGE (OUTPATIENT)
Dept: INTERNAL MEDICINE | Facility: CLINIC | Age: 64
End: 2024-08-14
Payer: MEDICARE

## 2024-08-14 ENCOUNTER — OFFICE VISIT (OUTPATIENT)
Dept: TRANSPLANT | Facility: CLINIC | Age: 64
End: 2024-08-14
Payer: MEDICARE

## 2024-08-14 DIAGNOSIS — I12.9 BENIGN HYPERTENSION WITH CKD (CHRONIC KIDNEY DISEASE) STAGE III: ICD-10-CM

## 2024-08-14 DIAGNOSIS — N18.30 BENIGN HYPERTENSION WITH CKD (CHRONIC KIDNEY DISEASE) STAGE III: ICD-10-CM

## 2024-08-14 DIAGNOSIS — Z79.4 TYPE 2 DIABETES MELLITUS WITH STAGE 3B CHRONIC KIDNEY DISEASE, WITH LONG-TERM CURRENT USE OF INSULIN: ICD-10-CM

## 2024-08-14 DIAGNOSIS — Z79.60 LONG-TERM USE OF IMMUNOSUPPRESSANT MEDICATION: ICD-10-CM

## 2024-08-14 DIAGNOSIS — E11.22 TYPE 2 DIABETES MELLITUS WITH STAGE 3B CHRONIC KIDNEY DISEASE, WITH LONG-TERM CURRENT USE OF INSULIN: ICD-10-CM

## 2024-08-14 DIAGNOSIS — Z94.0 S/P KIDNEY TRANSPLANT: Primary | ICD-10-CM

## 2024-08-14 DIAGNOSIS — N18.32 TYPE 2 DIABETES MELLITUS WITH STAGE 3B CHRONIC KIDNEY DISEASE, WITH LONG-TERM CURRENT USE OF INSULIN: ICD-10-CM

## 2024-08-14 PROCEDURE — 4010F ACE/ARB THERAPY RXD/TAKEN: CPT | Mod: HCNC,CPTII,95, | Performed by: NURSE PRACTITIONER

## 2024-08-14 PROCEDURE — 3066F NEPHROPATHY DOC TX: CPT | Mod: HCNC,CPTII,95, | Performed by: NURSE PRACTITIONER

## 2024-08-14 PROCEDURE — 3044F HG A1C LEVEL LT 7.0%: CPT | Mod: HCNC,CPTII,95, | Performed by: NURSE PRACTITIONER

## 2024-08-14 PROCEDURE — 99214 OFFICE O/P EST MOD 30 MIN: CPT | Mod: HCNC,95,, | Performed by: NURSE PRACTITIONER

## 2024-08-14 NOTE — LETTER
August 14, 2024        Fred Kelly  [address]  [facility] [address]  Phone: [phone]  Fax: [phone]             [facility]  [address]  [facility] [address]  Phone: [phone]   Patient: Kathie Quezada   MR Number: [id number]   YOB: 1960   Date of Visit: 8/14/2024       Dear Dr. Fred Kelly    Thank you for referring Kathie Quezada to me for evaluation. Attached you will find relevant portions of my assessment and plan of care.    If you have questions, please do not hesitate to call me. I look forward to following Kathie Quezada along with you.    Sincerely,    Maria Del Carmen Holland NP    Enclosure    If you would like to receive this communication electronically, please contact [email or web address] or [phone] to request [employer] access.    [employer] is a tool which provides read-only access to select patient information with whom you have a relationship. Its easy to use and provides real time access to review your patients record including encounter summaries, notes, results, and demographic information.    If you feel you have received this communication in error or would no longer like to receive these types of communications, please e-mail [email or web address]

## 2024-08-16 NOTE — Clinical Note
"Milton Sanchezwojciech Mejia was seen and treated in our emergency department on 8/16/2024.  He may return to school on 08/19/2024.      If you have any questions or concerns, please don't hesitate to call.      Jacki Fitch, NP" A percutaneous stick to the left radial artery was performed.

## 2024-08-20 ENCOUNTER — TELEPHONE (OUTPATIENT)
Dept: INTERNAL MEDICINE | Facility: CLINIC | Age: 64
End: 2024-08-20
Payer: MEDICARE

## 2024-08-20 ENCOUNTER — TELEPHONE (OUTPATIENT)
Dept: ENDOCRINOLOGY | Facility: CLINIC | Age: 64
End: 2024-08-20
Payer: MEDICARE

## 2024-08-20 NOTE — TELEPHONE ENCOUNTER
----- Message from Elan Price MD sent at 8/20/2024  4:40 PM CDT -----  Contact: Celsa/Pa [phone]  She needs to contact the doctor prescribing her insulin.  Her A1c was normal before transplant.  I don't prescribe continuous monitor for normal A1c.  ----- Message -----  From: Alexa Guerrier  Sent: 8/20/2024   4:30 PM CDT  To: Albert GOMEZ Staff    Pt requesting a continuous glucose monitor. Pa is calling requesting auth be submitted to them in order for her to get it approved. Please include clinicals and signed physicians order. Thanks

## 2024-08-20 NOTE — TELEPHONE ENCOUNTER
Tried to call [employer] number provided to inform them Dr Price did not write Rx for cgm. Prompt requested Dr Price's NPI.   Tried to bypass. Call kept disconnecting.

## 2024-08-20 NOTE — TELEPHONE ENCOUNTER
----- Message from Alysa Luna sent at 8/20/2024  3:56 PM CDT -----  Regarding: Pharm Auth  Contact: [phone]  Patient calling to advise pharmacy is unable to fill blood-glucose sensor (FREESTYLE LIS 3 SENSOR) Device due to insurance. Patient states medication is 120.00

## 2024-08-23 ENCOUNTER — TELEPHONE (OUTPATIENT)
Dept: INTERNAL MEDICINE | Facility: CLINIC | Age: 64
End: 2024-08-23
Payer: MEDICARE

## 2024-08-23 DIAGNOSIS — E11.3553 CONTROLLED TYPE 2 DIABETES MELLITUS WITH STABLE PROLIFERATIVE RETINOPATHY OF BOTH EYES, UNSPECIFIED WHETHER LONG TERM INSULIN USE: Primary | Chronic | ICD-10-CM

## 2024-08-23 RX ORDER — BLOOD-GLUCOSE SENSOR
2 EACH MISCELLANEOUS
Qty: 2 EACH | Refills: 6 | Status: SHIPPED | OUTPATIENT
Start: 2024-08-23 | End: 2024-08-23

## 2024-08-23 RX ORDER — BLOOD-GLUCOSE SENSOR
2 EACH MISCELLANEOUS
Qty: 2 EACH | Refills: 6 | Status: SHIPPED | OUTPATIENT
Start: 2024-08-23 | End: 2025-08-23

## 2024-08-23 NOTE — TELEPHONE ENCOUNTER
Called and spoke to patient about following up with Endocrinologist but patient could not remember who she saw last. Will send message to Trang Rivero for follow up from [facility] for CGM.

## 2024-08-23 NOTE — TELEPHONE ENCOUNTER
Sent Malissa 3 Prescription to [facility].  Patient stated that she was told that it needed to be sent to a DME company, but did not provide me with the DME information. I called the pharmacy twice to speak with he pharmacist but was not successful.       Chente Christopher DNP, FNP-C  Department of Endocrinology  Inpatient Glycemic Management

## 2024-09-12 ENCOUNTER — PATIENT OUTREACH (OUTPATIENT)
Dept: EMERGENCY MEDICINE | Facility: HOSPITAL | Age: 64
End: 2024-09-12
Payer: MEDICARE

## 2024-09-12 NOTE — PROGRESS NOTES
Pt denies having any concerns at this time. She states her blood sugar has been doing good and she is doing fine. She will follow up with Endocrinology on 9-16-24 and with her PCP on 9-24-24. Pt encouraged to reach out with any concerns at they arise. ED Navigator will continue to follow up and remind of appt's.

## 2024-09-16 ENCOUNTER — OFFICE VISIT (OUTPATIENT)
Dept: ENDOCRINOLOGY | Facility: CLINIC | Age: 64
End: 2024-09-16
Payer: MEDICARE

## 2024-09-16 DIAGNOSIS — R73.9 HYPERGLYCEMIA: ICD-10-CM

## 2024-09-16 DIAGNOSIS — E11.3553 CONTROLLED TYPE 2 DIABETES MELLITUS WITH STABLE PROLIFERATIVE RETINOPATHY OF BOTH EYES, UNSPECIFIED WHETHER LONG TERM INSULIN USE: Chronic | ICD-10-CM

## 2024-09-16 DIAGNOSIS — Z79.4 TYPE 2 DIABETES MELLITUS WITH STAGE 3B CHRONIC KIDNEY DISEASE, WITH LONG-TERM CURRENT USE OF INSULIN: ICD-10-CM

## 2024-09-16 DIAGNOSIS — E11.42 DIABETIC POLYNEUROPATHY ASSOCIATED WITH TYPE 2 DIABETES MELLITUS: Primary | Chronic | ICD-10-CM

## 2024-09-16 DIAGNOSIS — E11.22 TYPE 2 DIABETES MELLITUS WITH STAGE 3B CHRONIC KIDNEY DISEASE, WITH LONG-TERM CURRENT USE OF INSULIN: ICD-10-CM

## 2024-09-16 DIAGNOSIS — N18.32 TYPE 2 DIABETES MELLITUS WITH STAGE 3B CHRONIC KIDNEY DISEASE, WITH LONG-TERM CURRENT USE OF INSULIN: ICD-10-CM

## 2024-09-16 PROCEDURE — 99214 OFFICE O/P EST MOD 30 MIN: CPT | Mod: HCNC,95,, | Performed by: INTERNAL MEDICINE

## 2024-09-16 PROCEDURE — 4010F ACE/ARB THERAPY RXD/TAKEN: CPT | Mod: HCNC,CPTII,95, | Performed by: INTERNAL MEDICINE

## 2024-09-16 PROCEDURE — 3044F HG A1C LEVEL LT 7.0%: CPT | Mod: HCNC,CPTII,95, | Performed by: INTERNAL MEDICINE

## 2024-09-16 PROCEDURE — 3066F NEPHROPATHY DOC TX: CPT | Mod: HCNC,CPTII,95, | Performed by: INTERNAL MEDICINE

## 2024-09-16 RX ORDER — BLOOD-GLUCOSE SENSOR
1 EACH MISCELLANEOUS
Qty: 3 EACH | Refills: 11 | Status: SHIPPED | OUTPATIENT
Start: 2024-09-16

## 2024-09-16 RX ORDER — BLOOD-GLUCOSE SENSOR
1 EACH MISCELLANEOUS
Qty: 3 EACH | Refills: 11 | Status: SHIPPED | OUTPATIENT
Start: 2024-09-16 | End: 2025-09-16

## 2024-09-16 NOTE — ASSESSMENT & PLAN NOTE
- Continue with current regimen. Some readings below goal (70s) after breakfast to which patient attributes to eating small/low carb or no food with breakfast- recommended to hold novolog during these mornings.  - Advised to check BG prior to bedtime so we can assess novolog dinner dose   - Will send new CGM rx to pharmacy   - Patient denies needing any refills at this time  - Advised patient to reach out for any refills if needed or any BG related questions/concerns. Can otherwise follow up with PCP or in Endocrinology sugar clinic.

## 2024-09-16 NOTE — PROGRESS NOTES
Subjective:      Patient ID: Kathie Quezada is a 64 y.o. female.    Chief Complaint:  No chief complaint on file.    The attending portion of this evaluation, treatment, and documentation was performed per Trang Rivero PA-C via Telemedicine AudioVisual using the secure [employer] software platform with two-way audio/video. The provider was located on-site and the patient is located at home. The aforementioned video software was utilized to document the relevant history and physical exam.     History of Present Illness    Pt is a 64 y.o. female who presents to clinic for follow up.  Pt recently admitted and endocrine was consulted for management of hyperglycemia.    Discharge date: 8/7/24  Discharge regimen specific to hyperglycemia: - Lantus 15 units nightly  - Novolog 6 units with meals + SSI  180 - 230 + 2 unit  231- 280  + 4 units  281 - 330 + 6 units  331 - 380 + 8 units      > 380   + 10 units  Glucocorticoid: Prednisone 5 mg QD  Date of transplant? kidney transplant 5/23/24     Was able to fill prescription for medications and supplies     Etiology of the hyperglycemia: known T2DM     Diabetes Complications include:     Hyperglycemia     Complicating diabetes co morbidities:   Glucocorticoid use, CKD      Current regimen: - Lantus 18 units nightly  - Novolog 8 units with meals + SSI  180 - 230 + 2 unit  231- 280  + 4 units  281 - 330 + 6 units  331 - 380 + 8 units      > 380   + 10 units     Missed doses?  Novolog when not eating or when BG < 100.     Prior medications not tolerated or Failed treatments:   Novolog 3 units with meals + SSI     # times a day testing 3x  Log reviewed: yes - see picture    No log provided - patient reported to be:     Pre breakfast   Pre lunch 70s-low 200s  Pre dinner 90s-180s   Qhs- not checking     Hypoglycemic event? None     Typical meals:   Breakfast- boiled egg, grits, toast, banana (typically small)  Lunch- sandwich, salad, chicken  Dinner- meat, rice,  veggies  Snacks- occ brownies/potato chips, rice cakes, cookies, jolly ranchers       Education - last visit: years ago     With regards to reason for admit:  Doing better       Review of Systems   Gastrointestinal:  Positive for diarrhea. Negative for constipation, nausea and vomiting.   Endocrine: Negative for polydipsia and polyuria.       Objective:   Physical Exam  Vitals reviewed.   Constitutional:       Appearance: Normal appearance.   HENT:      Head: Normocephalic.   Neurological:      General: No focal deficit present.      Mental Status: She is alert and oriented to person, place, and time.   Psychiatric:         Mood and Affect: Mood normal.         Behavior: Behavior normal.         Thought Content: Thought content normal.         There is no height or weight on file to calculate BMI.    Lab Review:   Lab Results   Component Value Date    HGBA1C 5.5 05/23/2024     Lab Results   Component Value Date    CHOL 176 05/02/2024    HDL 60 05/02/2024    LDLCALC 90.6 05/02/2024    TRIG 127 05/02/2024    CHOLHDL 34.1 05/02/2024     Lab Results   Component Value Date     08/19/2024    K 4.6 08/19/2024     (H) 08/19/2024    CO2 19 (L) 08/19/2024     (H) 08/19/2024    BUN 13 08/19/2024    CREATININE 1.1 08/19/2024    CALCIUM 9.4 08/19/2024    PROT 7.3 08/07/2024    ALBUMIN 3.6 08/19/2024    BILITOT 0.6 08/07/2024    ALKPHOS 63 08/07/2024    AST 15 08/07/2024    ALT 8 (L) 08/07/2024    ANIONGAP 11 08/19/2024    ESTGFRAFRICA 15.0 (A) 07/30/2022    EGFRNONAA 13.0 (A) 07/30/2022    TSH <0.010 (L) 07/01/2024       Assessment and Plan     Problem List Items Addressed This Visit          Neuro    Diabetic polyneuropathy associated with type 2 diabetes mellitus - Primary (Chronic)    Current Assessment & Plan                   Endocrine    Controlled type 2 diabetes mellitus with stable proliferative retinopathy of both eyes, unspecified whether long term insulin use (Chronic)    Current Assessment & Plan        - Continue with current regimen. Some readings below goal (70s) after breakfast to which patient attributes to eating small/low carb or no food with breakfast- recommended to hold novolog during these mornings.  - Advised to check BG prior to bedtime so we can assess novolog dinner dose   - Will send new CGM rx to pharmacy   - Patient denies needing any refills at this time  - Advised patient to reach out for any refills if needed or any BG related questions/concerns. Can otherwise follow up with PCP or in Endocrinology sugar clinic.          Type 2 diabetes mellitus with stage 3b chronic kidney disease, with long-term current use of insulin    Current Assessment & Plan                 Other Visit Diagnoses       Hyperglycemia

## 2024-09-18 RX ORDER — BLOOD-GLUCOSE SENSOR
2 EACH MISCELLANEOUS
Qty: 2 EACH | Refills: 6 | Status: SHIPPED | OUTPATIENT
Start: 2024-09-18 | End: 2025-09-18

## 2024-09-23 ENCOUNTER — PATIENT OUTREACH (OUTPATIENT)
Dept: EMERGENCY MEDICINE | Facility: HOSPITAL | Age: 64
End: 2024-09-23
Payer: MEDICARE

## 2024-09-23 NOTE — PROGRESS NOTES
Spoke with Pt regarding to f/u from her last encounter. Pt states that she is doing well and is aware of her labs and PCP appt for tomorrow. the patient denies having any needs at this time. ED navigator will follow-up with patient to assist as needed.

## 2024-09-23 NOTE — PROGRESS NOTES
ED Navigator called to remind Pt of appt with, [facility] on 9-24-24 at 9am. Pt verbalized understanding.

## 2024-09-24 ENCOUNTER — OFFICE VISIT (OUTPATIENT)
Dept: INTERNAL MEDICINE | Facility: CLINIC | Age: 64
End: 2024-09-24
Payer: MEDICARE

## 2024-09-24 ENCOUNTER — IMMUNIZATION (OUTPATIENT)
Dept: INTERNAL MEDICINE | Facility: CLINIC | Age: 64
End: 2024-09-24
Payer: MEDICARE

## 2024-09-24 ENCOUNTER — PATIENT MESSAGE (OUTPATIENT)
Dept: TRANSPLANT | Facility: CLINIC | Age: 64
End: 2024-09-24
Payer: MEDICARE

## 2024-09-24 VITALS
WEIGHT: 228.19 LBS | HEIGHT: 69 IN | SYSTOLIC BLOOD PRESSURE: 136 MMHG | BODY MASS INDEX: 33.8 KG/M2 | OXYGEN SATURATION: 95 % | HEART RATE: 78 BPM | DIASTOLIC BLOOD PRESSURE: 76 MMHG

## 2024-09-24 DIAGNOSIS — I50.32 CHRONIC DIASTOLIC HEART FAILURE: Chronic | ICD-10-CM

## 2024-09-24 DIAGNOSIS — E11.42 DIABETIC POLYNEUROPATHY ASSOCIATED WITH TYPE 2 DIABETES MELLITUS: Chronic | ICD-10-CM

## 2024-09-24 DIAGNOSIS — D68.1 FACTOR XI DEFICIENCY: Chronic | ICD-10-CM

## 2024-09-24 DIAGNOSIS — Z12.31 ENCOUNTER FOR SCREENING MAMMOGRAM FOR BREAST CANCER: ICD-10-CM

## 2024-09-24 DIAGNOSIS — E78.2 MIXED HYPERLIPIDEMIA: ICD-10-CM

## 2024-09-24 DIAGNOSIS — Z79.60 LONG-TERM USE OF IMMUNOSUPPRESSANT MEDICATION: ICD-10-CM

## 2024-09-24 DIAGNOSIS — D63.8 ANEMIA OF CHRONIC DISEASE: ICD-10-CM

## 2024-09-24 DIAGNOSIS — M85.89 OSTEOPENIA OF MULTIPLE SITES: ICD-10-CM

## 2024-09-24 DIAGNOSIS — M79.10 MYALGIA DUE TO STATIN: ICD-10-CM

## 2024-09-24 DIAGNOSIS — I27.20 PULMONARY HYPERTENSION: ICD-10-CM

## 2024-09-24 DIAGNOSIS — E66.09 CLASS 1 OBESITY DUE TO EXCESS CALORIES WITH BODY MASS INDEX (BMI) OF 33.0 TO 33.9 IN ADULT, UNSPECIFIED WHETHER SERIOUS COMORBIDITY PRESENT: ICD-10-CM

## 2024-09-24 DIAGNOSIS — M89.8X9 METABOLIC BONE DISEASE: ICD-10-CM

## 2024-09-24 DIAGNOSIS — Z23 NEED FOR PNEUMOCOCCAL 20-VALENT CONJUGATE VACCINATION: ICD-10-CM

## 2024-09-24 DIAGNOSIS — E89.0 POSTOPERATIVE HYPOTHYROIDISM: Chronic | ICD-10-CM

## 2024-09-24 DIAGNOSIS — T38.0X5A ADRENAL CORTICAL STEROIDS CAUSING ADVERSE EFFECT IN THERAPEUTIC USE: ICD-10-CM

## 2024-09-24 DIAGNOSIS — E55.9 VITAMIN D DEFICIENCY: ICD-10-CM

## 2024-09-24 DIAGNOSIS — N25.81 SECONDARY HYPERPARATHYROIDISM OF RENAL ORIGIN: ICD-10-CM

## 2024-09-24 DIAGNOSIS — I15.0 RENOVASCULAR HYPERTENSION: ICD-10-CM

## 2024-09-24 DIAGNOSIS — F41.1 GENERALIZED ANXIETY DISORDER: Chronic | ICD-10-CM

## 2024-09-24 DIAGNOSIS — E11.3553 CONTROLLED TYPE 2 DIABETES MELLITUS WITH STABLE PROLIFERATIVE RETINOPATHY OF BOTH EYES, UNSPECIFIED WHETHER LONG TERM INSULIN USE: Chronic | ICD-10-CM

## 2024-09-24 DIAGNOSIS — T46.6X5A MYALGIA DUE TO STATIN: ICD-10-CM

## 2024-09-24 DIAGNOSIS — Z94.0 S/P KIDNEY TRANSPLANT: Primary | ICD-10-CM

## 2024-09-24 DIAGNOSIS — Z29.89 PROPHYLACTIC IMMUNOTHERAPY: ICD-10-CM

## 2024-09-24 DIAGNOSIS — Z23 NEED FOR VACCINATION: Primary | ICD-10-CM

## 2024-09-24 PROCEDURE — G0008 ADMIN INFLUENZA VIRUS VAC: HCPCS | Mod: HCNC,S$GLB,, | Performed by: INTERNAL MEDICINE

## 2024-09-24 PROCEDURE — 90653 IIV ADJUVANT VACCINE IM: CPT | Mod: HCNC,S$GLB,, | Performed by: INTERNAL MEDICINE

## 2024-09-24 PROCEDURE — 99999 PR PBB SHADOW E&M-EST. PATIENT-LVL V: CPT | Mod: PBBFAC,HCNC,, | Performed by: INTERNAL MEDICINE

## 2024-09-24 NOTE — PROGRESS NOTES
INTERNAL MEDICINE CLINIC  Follow-up Visit Progress Note     PRESENTING HISTORY     PCP: Elan Price MD    Last Clinic Visit with me:  6-    Current Chief Complaint/Problem:    Chief Complaint   Patient presents with    Follow-up       History of Present Illness & ROS: Ms. Kathie Quezada is a 64 y.o. female.    Doing well post transplant.    No chest pain or SOB.    On insulin now post transplant. Managed by Endocrine.    PAST HISTORY:     Past Medical History:   Diagnosis Date    Acute respiratory failure with hypoxia and hypercapnia 05/26/2019    ATN (acute tubular necrosis) 02/08/2019    Chronic diastolic heart failure 02/07/2019 2-8-19 Mild left atrial enlargement. Mild left ventricular enlargement. Normal left ventricular systolic function. The estimated ejection fraction is 55% Indeterminate left ventricular diastolic function. Normal right ventricular systolic function. Mild mitral regurgitation. Mild to moderate tricuspid regurgitation. The estimated PA systolic pressure is 32 mm Hg Normal central venous pressure (3 m    CKD (chronic kidney disease) stage 4, GFR 15-29 ml/min 05/16/2018    CKD stage G3a/A3, GFR 45-59 and albumin creatinine ratio >300 mg/g 05/16/2018    CKD stage G3b/A3, GFR 30-44 and albumin creatinine ratio >300 mg/g 05/16/2018    Closed compression fracture of L2 lumbar vertebra 05/24/2019    Closed compression fracture of L2 lumbar vertebra 05/24/2019     IMO Regulatory Load October 2019    Clotted dialysis access 07/22/2023    Controlled type 2 diabetes with retinopathy 04/09/2018    COVID-19 virus infection 7-1-2021 07/01/2021    CVA (cerebral vascular accident) 2017    Diabetic polyneuropathy associated with type 2 diabetes mellitus 12/04/2020    Encounter for blood transfusion     Essential hypertension 04/09/2018    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  7-31-19 Factor XI Activity 55 - 145 % 96       Gastroesophageal reflux disease without  esophagitis 05/24/2019    Generalized anxiety disorder 11/08/2019    GERD (gastroesophageal reflux disease)     GIB (gastrointestinal bleeding) 10/18/2018    History of CVA (cerebrovascular accident) 11/27/2022    History of CVA (cerebrovascular accident) without residual deficits 11/27/2022    History of hepatitis C     s/p succesful Rx w/ Harvoni  - SVR12 (cure) 2016    Hypertensive retinopathy, bilateral 12/10/2019    Mild nonproliferative diabetic retinopathy 05/02/2018    Mixed hyperlipidemia 04/09/2018    Myalgia due to statin 09/07/2022    Nephrotic range proteinuria 04/09/2018    Normocytic anemia 01/14/2022    NS (nuclear sclerosis), bilateral 12/10/2019    Peanut allergy 02/04/2023    PNA (pneumonia) 05/28/2019    Postoperative hypothyroidism     Prophylactic immunotherapy 05/24/2024    Pulmonary hypertension 02/06/2023    Renovascular hypertension 04/09/2018    S/P arteriovenous (AV) fistula creation 08/14/2019 8/14/19: L2 kyphoplasty with Dr. Kwon       S/P kidney transplant 05/24/2024    S/P kyphoplasty 08/14/2019 8/14/19: L2 kyphoplasty with Dr. Kwon     Slow transit constipation 05/27/2019    Type 2 diabetes mellitus with circulatory disorder, with long-term current use of insulin 04/09/2018    Type 2 diabetes mellitus with stage 3 chronic kidney disease, without long-term current use of insulin 04/09/2018    Type 2 diabetes mellitus with stage 3a chronic kidney disease, without long-term current use of insulin 03/03/2021    Type 2 diabetes mellitus with stage 3b chronic kidney disease, without long-term current use of insulin 09/07/2022       Past Surgical History:   Procedure Laterality Date    AV FISTULA PLACEMENT Left 10/27/2022    Procedure: CREATION, AV FISTULA;  Surgeon: Tirso Tavera MD;  Location: [facility] OR [address] [place] [address];  Service: Peripheral Vascular;  Laterality: Left;  Brachio basilic 1st stage     BACK SURGERY  2017    CHOLECYSTECTOMY  2015    [facility]    COLONOSCOPY  11/13/2015    Dr Magana  Piedad - good prep. internal hemorrhoids. diverticulosis of sigmois. NO polyps. repeat due 2025    FISTULOGRAM N/A 12/16/2022    Procedure: Fistulogram;  Surgeon: Tirso Tavera MD;  Location: [facility] CATH LAB;  Service: Peripheral Vascular;  Laterality: N/A;    FISTULOGRAM, WITH PTA Left 3/23/2023    Procedure: FISTULOGRAM, WITH PTA;  Surgeon: Tirso Tavera MD;  [facility];  Service: Peripheral Vascular;  Laterality: Left;  LUE TRANSRADIAL CO2 FISTULOGRAM    1.9 min  22.99 mGy  4.3032 Gy.cm  200cc CO2  9ml TransRadial Solution    FISTULOGRAM, WITH PTA Left 8/15/2023    Procedure: FISTULOGRAM, WITH PTA TRANSRADIAL;  Surgeon: Tirso Tavera MD;  [facility];  Service: Peripheral Vascular;  Laterality: Left;  Fluoro: 5.1 min, mGy: 7.95, Gycm2: 1.2626, contrast: 12ml    FIXATION KYPHOPLASTY Bilateral 08/14/2019    Procedure: L2 kyphoplasty Fluoro Globus;  Surgeon: Jeremie Kown DO;  Location: [facility] OR;  Service: Neurosurgery;  Laterality: Bilateral;  GLOBUS MACRINA WINSLOW [phone] TEXTED HER @ 10:54AM ON 7-30-19  PRE-OP-BY RN  8-7-2019    HYSTERECTOMY      KIDNEY TRANSPLANT N/A 5/23/2024    Procedure: TRANSPLANT, KIDNEY;  Surgeon: Stepan Bernardo MD;  [facility];  Service: Transplant;  Laterality: N/A;    OOPHORECTOMY  1996    one    PERCUTANEOUS TRANSLUMINAL ANGIOPLASTY OF ARTERIOVENOUS FISTULA Left 12/16/2022    Procedure: PTA, AV FISTULA;  Surgeon: Tirso Tavera MD;  Location: [facility] CATH LAB;  Service: Peripheral Vascular;  Laterality: Left;    PERCUTANEOUS TRANSLUMINAL ANGIOPLASTY OF ARTERIOVENOUS FISTULA Left 11/30/2023    Procedure: PTA, AV FISTULA;  Surgeon: Tirso Tavera MD;  Location: [facility] CATH LAB;  Service: Peripheral Vascular;  Laterality: Left;  left transradial fistulagram    RIGHT HEART CATHETERIZATION Right 6/15/2023    Procedure: INSERTION, CATHETER, RIGHT HEART;  Surgeon: Lucretia Rivas MD;  Location: [facility] CATH LAB;  Service: Cardiology;   Laterality: Right;    THYROIDECTOMY  2016    at [facility] for thyroid nodule    TONSILLECTOMY      TRANSPOSITION OF BASILIC VEIN Left 1/31/2023    Procedure: TRANSPOSITION, VEIN, BASILIC;  Surgeon: Tirso Tavera MD;  Location: [facility] OR [address] [place] [address];  Service: Peripheral Vascular;  Laterality: Left;       Family History   Problem Relation Name Age of Onset    Stroke Mother      Heart disease Mother      Hypertension Mother      Diabetes type II Mother      Coronary artery disease Mother      Hypertension Father      Prostate cancer Father      Aneurysm Brother         Social History     Socioeconomic History    Marital status:    Tobacco Use    Smoking status: Never     Passive exposure: Past    Smokeless tobacco: Never   Substance and Sexual Activity    Alcohol use: Not Currently    Drug use: No    Sexual activity: Not Currently   Social History Narrative    Pt enjoys cooking and baking and making Floral arrangement and Ker[employer]     Caregiver Daughter Mike     Social Determinants of Health     Financial Resource Strain: Medium Risk (8/9/2024)    Overall Financial Resource Strain (CARDIA)     Difficulty of Paying Living Expenses: Somewhat hard   Food Insecurity: No Food Insecurity (5/24/2024)    Hunger Vital Sign     Worried About Running Out of Food in the Last Year: Never true     Ran Out of Food in the Last Year: Never true   Transportation Needs: No Transportation Needs (8/9/2024)    PRAPARE - Transportation     Lack of Transportation (Medical): No     Lack of Transportation (Non-Medical): No   Physical Activity: Inactive (8/9/2024)    Exercise Vital Sign     Days of Exercise per Week: 0 days     Minutes of Exercise per Session: 0 min   Stress: No Stress Concern Present (8/9/2024)    [employer] [place] [employer] - Occupational Stress Questionnaire     Feeling of Stress : Not at all   Housing Stability: Low Risk  (8/9/2024)    Housing Stability Vital Sign     Unable to Pay for Housing in the  Last Year: No     Homeless in the Last Year: No       MEDICATIONS & ALLERGIES:     Current Outpatient Medications on File Prior to Visit   Medication Sig Dispense Refill    atovaquone (MEPRON) 750 mg/5 mL Susp oral liquid Take 10 mLs (1,500 mg total) by mouth once daily. Stop 11/19/24 300 mL 5    blood sugar diagnostic Strp 1 each by Misc.(Non-Drug; Combo Route) route 3 (three) times daily. 100 each 2    blood-glucose meter Misc Use as instructed 1 each 0    blood-glucose sensor (DEXCOM G7 SENSOR) Evelyn 1 each by Misc.(Non-Drug; Combo Route) route every 10 days. 3 each 11    blood-glucose sensor (FREESTYLE LIS 3 SENSOR) Evelyn 1 each by Misc.(Non-Drug; Combo Route) route every 14 (fourteen) days. 3 each 11    blood-glucose sensor (FREESTYLE LIS 3 SENSOR) Evelyn 2 each by Misc.(Non-Drug; Combo Route) route every 14 (fourteen) days. 2 each 6    carvediloL (COREG) 6.25 MG tablet Take 1 tablet (6.25 mg total) by mouth 2 (two) times daily. 180 tablet 3    cholecalciferol, vitamin D3, 125 mcg (5,000 unit) Tab Take 1 tablet (5,000 Units total) by mouth once daily. 90 tablet 3    EScitalopram oxalate (LEXAPRO) 10 MG tablet Take 1 tablet (10 mg total) by mouth every evening. 90 tablet 3    ezetimibe (ZETIA) 10 mg tablet Take 1 tablet (10 mg total) by mouth once daily. 90 tablet 3    famotidine (PEPCID) 20 MG tablet Take 1 tablet (20 mg total) by mouth every evening. 30 tablet 0    insulin aspart U-100 (NOVOLOG FLEXPEN U-100 INSULIN) 100 unit/mL (3 mL) InPn pen Inject 8 Units into the skin 3 (three) times daily with meals. May also inject 0-10 Units as needed (High blood sugar). Add the following Sliding Scale:   180 - 230 + 2 unit  231- 280  + 4 units  281 - 330 + 6 units  331 - 380 + 8 units   > 380   + 10 units. 6 mL 2    insulin glargine U-100, Lantus, (LANTUS SOLOSTAR U-100 INSULIN) 100 unit/mL (3 mL) InPn pen Inject 18 Units into the skin every evening. 6 mL 2    lancets Misc 1 each by Misc.(Non-Drug; Combo Route) route  "3 (three) times daily. 100 each 2    levocetirizine (XYZAL) 5 MG tablet Take 1 tablet (5 mg total) by mouth daily as needed for Allergies. 90 tablet 3    levothyroxine (SYNTHROID) 150 MCG tablet Take 1 tablet (150 mcg total) by mouth before breakfast. 90 tablet 3    multivitamin Tab Take 1 tablet by mouth once daily.      mycophenolate (CELLCEPT) 250 mg Cap Take 4 capsules (1,000 mg total) by mouth 2 (two) times daily. 240 capsule 11    NIFEdipine (PROCARDIA-XL) 30 MG (OSM) 24 hr tablet Take 1 tablet (30 mg total) by mouth once daily. 30 tablet 5    pen needle, diabetic 32 gauge x 5/32" Ndle 1 each by Misc.(Non-Drug; Combo Route) route 3 (three) times daily with meals. 200 each 4    predniSONE (DELTASONE) 5 MG tablet Take by mouth daily; 5/26/2024-6/1/2024: 20 mg, 6/2/2024-6/8/2024: 15 mg; 6/9/2024-6/15/2024: 10 mg; 6/16/2024- forever: 5 mg; do not stop (Patient taking differently: Take 5 mg by mouth once daily. Take by mouth daily; 5/26/2024-6/1/2024: 20 mg, 6/2/2024-6/8/2024: 15 mg; 6/9/2024-6/15/2024: 10 mg; 6/16/2024- forever: 5 mg; do not stop) 70 tablet 11    tacrolimus (PROGRAF) 1 MG Cap Take 3 capsules (3 mg total) by mouth once daily AND 2 capsules (2 mg total) every evening. 240 capsule 11    traZODone (DESYREL) 50 MG tablet Take 1 to 2 tablets ( mg total) by mouth nightly as needed for Insomnia. 120 tablet 3    valGANciclovir (VALCYTE) 450 mg Tab Take 2 tablets (900 mg total) by mouth every morning. Stop 8/21/24 60 tablet 2     No current facility-administered medications on file prior to visit.        Review of patient's allergies indicates:   Allergen Reactions    Atorvastatin Other (See Comments)     Statin induced myalgia    Mushroom Hives    Peanut Anaphylaxis    Bactrim [sulfamethoxazole-trimethoprim] Hives    Gabapentin      Pruritis     Iodine     Peanut butter flavor     Penicillins Hives    Shellfish containing products Itching    Sulfa (sulfonamide antibiotics) Hives       Medications " Reconciliation:   I have reconciled the patient's home medications and discharge medications with the patient/family. I have updated all changes.  Refer to After-Visit Medication List.    OBJECTIVE:     Vital Signs:  Vitals:    09/24/24 0834   BP: 136/76   Pulse: 78     Wt Readings from Last 3 Encounters:   09/24/24 0834 103.5 kg (228 lb 2.8 oz)   08/07/24 1338 101.2 kg (223 lb)   07/31/24 1302 102.8 kg (226 lb 10.1 oz)     Body mass index is 33.7 kg/m².     Physical Exam:  General: Well developed, well nourished. No distress.  HEENT: Head is normocephalic, atraumatic  Eyes: Clear conjunctiva.  Neck: Supple, symmetrical neck; trachea midline.  Lungs: Clear to auscultation bilaterally and normal respiratory effort.  Cardiovascular: Heart with regular rate and rhythm.    Extremities: No LE edema  Abdomen: Abdomen is soft, non-tender non-distended with normal bowel sounds.  Skin: Skin color, texture, turgor normal. No rashes.  Musculoskeletal: Normal gait.   Psychiatric: Normal affect. Alert.      Laboratory  Lab Results   Component Value Date    WBC 7.39 08/19/2024    HGB 11.9 (L) 08/19/2024    HCT 38.3 08/19/2024     08/19/2024    CHOL 176 05/02/2024    TRIG 127 05/02/2024    HDL 60 05/02/2024    ALT 8 (L) 08/07/2024    AST 15 08/07/2024     08/19/2024    K 4.6 08/19/2024     (H) 08/19/2024    CREATININE 1.1 08/19/2024    BUN 13 08/19/2024    CO2 19 (L) 08/19/2024    TSH <0.010 (L) 07/01/2024    INR 1.1 05/27/2024    HGBA1C 5.5 05/23/2024       ASSESSMENT & PLAN:     S/P kidney transplant 5/23/2024  Prophylactic immunotherapy  Metabolic bone disease  Long-term use of immunosuppressant medication  Adrenal cortical steroid causing adverse reaction in therapeutic dose    Anemia of chronic disease  On Prednisone 5 mg daily        Cellcept 1000 mg BID         Prograf 3 mg am, 2 mg pm.        Atovaquone daily      Chronic diastolic heart failure  Pulmonary HTN  Renovascular hypertension  - Compensated  CHF.    Could not get DIgital HTN to work for her.          CHF/HTN Regimen:   -   Procardia XL 30 mg am only.      Carvedilol 6.25 mg night only.     Type 2 diabetes mellitus Diabetic Neuropathy and retinopathy  Diabetic neuropathy  - Diet controlled. A1c 5.5        Followed by Endocrine.     Novolog 8 units TID     Lantus 18 units HS     Mixed hyperlipidemia  Statin Induced myopathy.  - LDL goal < 100    Last LDL 88 ()     - Statin induced myopathy with atorvastatin.       On:  -     ezetimibe (ZETIA) 10 mg tablet; Take 1 tablet (10 mg total) by mouth once daily.        Vitamin D deficiency  Osteopenia of multiple sites  - Change from weekly to daily Vitamin D3 5000 IU daily OTC.   -     cholecalciferol, vitamin D3, 125 mcg (5,000 unit) Tab; Take 1 tablet (5,000 Units total) by mouth once daily.     -     DXA Bone Density Axial Skeleton 1 or more sites; Future; Expected date: 10/11/2024    Postoperative hypothyroidism     -     SYNTHROID 150 mcg daily. Reduced recently due to low TSH by Endocrine.     Generalized anxiety disorder  -     EScitalopram oxalate (LEXAPRO) 10 MG tablet nightly.  -     traZODone (DESYREL) 50 MG tablet; Take 1 tablet (50 mg total) by mouth nightly as needed for Insomnia.        Factor XI deficiency  - Usually get  FFP 2 units right before surgery for Factor XI deficiency     Class 1 obesity due to excess calories with body mass index (BMI) of 33.0 to 33.9 in adult, unspecified whether serious comorbidity present  Last  Body mass index is 35.42 kg/m².  Today: Body mass index is 33.7 kg/m².    Chronic allergic rhinitis  -     levocetirizine (XYZAL) 5 MG tablet; Take 1 tablet (5 mg total) by mouth daily as needed for Allergies.      Preventive Health Maintenance:     Flu vaccine  Need for pneumococcal 20-valent conjugate vaccination  -     (VFC) PCV20 (Prevnar 20) IM vaccine (>/= 6 wks)       Encounter for screening mammogram for breast cancer  -     Mammo Digital Screening Bilat;  "Future; Expected date: 10/02/2024    Return to Clinic for Follow Up with me:  January 2025 for annual.    Scheduled Follow-up :  Future Appointments   Date Time Provider Department Center   10/22/2024  9:30 AM Enrique Honeycutt DO [facility]   1/16/2025 12:00 PM Elan Price MD [facility]       After Visit Medication List :     Medication List            Accurate as of September 24, 2024  9:02 AM. If you have any questions, ask your nurse or doctor.                CHANGE how you take these medications      predniSONE 5 MG tablet  Commonly known as: DELTASONE  Take by mouth daily; 5/26/2024-6/1/2024: 20 mg, 6/2/2024-6/8/2024: 15 mg; 6/9/2024-6/15/2024: 10 mg; 6/16/2024- forever: 5 mg; do not stop  What changed:   how much to take  how to take this  when to take this            CONTINUE taking these medications      ACCU-CHEK GUIDE GLUCOSE METER Misc  Generic drug: blood-glucose meter  Use as instructed     ACCU-CHEK GUIDE TEST STRIPS Strp  Generic drug: blood sugar diagnostic  1 each by Misc.(Non-Drug; Combo Route) route 3 (three) times daily.     ACCU-CHEK SOFTCLIX LANCETS Misc  Generic drug: lancets  1 each by Misc.(Non-Drug; Combo Route) route 3 (three) times daily.     atovaquone 750 mg/5 mL Susp oral liquid  Commonly known as: MEPRON  Take 10 mLs (1,500 mg total) by mouth once daily. Stop 11/19/24     BD ULTRA-FINE OLGA LIDIA PEN NEEDLE 32 gauge x 5/32" Ndle  Generic drug: pen needle, diabetic  1 each by Misc.(Non-Drug; Combo Route) route 3 (three) times daily with meals.     carvediloL 6.25 MG tablet  Commonly known as: COREG  Take 1 tablet (6.25 mg total) by mouth 2 (two) times daily.     cholecalciferol (vitamin D3) 125 mcg (5,000 unit) Tab  Take 1 tablet (5,000 Units total) by mouth once daily.     * DEXCOM G7 SENSOR Evelyn  Generic drug: blood-glucose sensor  1 each by Misc.(Non-Drug; Combo Route) route every 10 days.     * FREESTYLE LIS 3 SENSOR Evelyn  Generic drug: blood-glucose sensor  1 " each by Misc.(Non-Drug; Combo Route) route every 14 (fourteen) days.     * FREESTYLE LIS 3 SENSOR Evelyn  Generic drug: blood-glucose sensor  2 each by Misc.(Non-Drug; Combo Route) route every 14 (fourteen) days.     EScitalopram oxalate 10 MG tablet  Commonly known as: LEXAPRO  Take 1 tablet (10 mg total) by mouth every evening.     ezetimibe 10 mg tablet  Commonly known as: ZETIA  Take 1 tablet (10 mg total) by mouth once daily.     famotidine 20 MG tablet  Commonly known as: PEPCID  Take 1 tablet (20 mg total) by mouth every evening.     LANTUS SOLOSTAR U-100 INSULIN 100 unit/mL (3 mL) Inpn pen  Generic drug: insulin glargine U-100 (Lantus)  Inject 18 Units into the skin every evening.     levocetirizine 5 MG tablet  Commonly known as: XYZAL  Take 1 tablet (5 mg total) by mouth daily as needed for Allergies.     levothyroxine 150 MCG tablet  Commonly known as: SYNTHROID  Take 1 tablet (150 mcg total) by mouth before breakfast.     multivitamin Tab     mycophenolate 250 mg Cap  Commonly known as: CELLCEPT  Take 4 capsules (1,000 mg total) by mouth 2 (two) times daily.     NIFEdipine 30 MG (OSM) 24 hr tablet  Commonly known as: PROCARDIA-XL  Take 1 tablet (30 mg total) by mouth once daily.     NovoLOG Flexpen U-100 Insulin 100 unit/mL (3 mL) Inpn pen  Generic drug: insulin aspart U-100  Inject 8 Units into the skin 3 (three) times daily with meals. May also inject 0-10 Units as needed (High blood sugar). Add the following Sliding Scale:   180 - 230 + 2 unit  231- 280  + 4 units  281 - 330 + 6 units  331 - 380 + 8 units   > 380   + 10 units.     tacrolimus 1 MG Cap  Commonly known as: PROGRAF  Take 3 capsules (3 mg total) by mouth once daily AND 2 capsules (2 mg total) every evening.     traZODone 50 MG tablet  Commonly known as: DESYREL  Take 1 to 2 tablets ( mg total) by mouth nightly as needed for Insomnia.     valGANciclovir 450 mg Tab  Commonly known as: VALCYTE  Take 2 tablets (900 mg total) by mouth  every morning. Stop 8/21/24           * This list has 3 medication(s) that are the same as other medications prescribed for you. Read the directions carefully, and ask your doctor or other care provider to review them with you.                  Signing Physician:  Elan Price MD

## 2024-09-26 NOTE — HPI
Kathie Quezada is a 62 year old Black woman with obesity, hypertension, diabetes mellitus type 2 with retinopathy and polyneuropathy, chronic diastolic heart failure, chronic kidney disease stage 5, anemia, history of stroke, history of thyroidectomy in 2016 with postoperative hypothyroidism, 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 She lives in [facility] by herself. She is . Her primary care physician is Dr. Elan Price. Her nephrologist is Dr. Fred Kelly.    She had a left arm AV fistula creation on 10/27/2022. She had balloon angioplasty of the fistula on 12/16/2022. She had transposition of the fistula on 1/31/2023. After the procedure, she felt short of breath and developed fever (measured as 101° Fahrenheit at home the next morning). Shortness of breath was not worsened or relieved by positional changes. She contacted the vascular surgeon, who advised her to go to the emergency department. She went to [facility]. She reported lower extremity edema, memory changes, confusion, poor appetite, and nausea that had been going on for a long time prior to the procedure. She was found to have hypoxia with oxygen saturation of 88%. Temperature was 101° Fahrenheit[place] [address]/uL. Chest X-ray showed clear lungs. Influenza, RSV, and COVID-19 were negative. She was admitted to Hospital Medicine Team B.   Opt out

## 2024-10-11 ENCOUNTER — HOSPITAL ENCOUNTER (OUTPATIENT)
Dept: RADIOLOGY | Facility: HOSPITAL | Age: 64
Discharge: HOME | End: 2024-10-11
Attending: INTERNAL MEDICINE
Payer: MEDICARE

## 2024-10-11 ENCOUNTER — HOSPITAL ENCOUNTER (OUTPATIENT)
Dept: RADIOLOGY | Facility: CLINIC | Age: 64
Discharge: HOME | End: 2024-10-11
Attending: INTERNAL MEDICINE
Payer: MEDICARE

## 2024-10-11 VITALS — BODY MASS INDEX: 32.93 KG/M2 | WEIGHT: 223 LBS

## 2024-10-11 DIAGNOSIS — Z12.31 ENCOUNTER FOR SCREENING MAMMOGRAM FOR BREAST CANCER: ICD-10-CM

## 2024-10-11 DIAGNOSIS — M85.89 OSTEOPENIA OF MULTIPLE SITES: ICD-10-CM

## 2024-10-11 PROCEDURE — 77080 DXA BONE DENSITY AXIAL: CPT | Mod: TC,HCNC

## 2024-10-11 PROCEDURE — 77067 SCR MAMMO BI INCL CAD: CPT | Mod: 26,HCNC,, | Performed by: RADIOLOGY

## 2024-10-11 PROCEDURE — 77063 BREAST TOMOSYNTHESIS BI: CPT | Mod: 26,HCNC,, | Performed by: RADIOLOGY

## 2024-10-11 PROCEDURE — 77080 DXA BONE DENSITY AXIAL: CPT | Mod: 26,HCNC,, | Performed by: INTERNAL MEDICINE

## 2024-10-11 PROCEDURE — 77067 SCR MAMMO BI INCL CAD: CPT | Mod: TC,HCNC

## 2024-10-17 ENCOUNTER — PATIENT MESSAGE (OUTPATIENT)
Dept: INTERNAL MEDICINE | Facility: CLINIC | Age: 64
End: 2024-10-17
Payer: MEDICARE

## 2024-10-17 DIAGNOSIS — M81.0 AGE-RELATED OSTEOPOROSIS WITHOUT CURRENT PATHOLOGICAL FRACTURE: Primary | ICD-10-CM

## 2024-10-17 RX ORDER — ALENDRONATE SODIUM 70 MG/1
70 TABLET ORAL
Qty: 12 TABLET | Refills: 3 | Status: SHIPPED | OUTPATIENT
Start: 2024-10-17 | End: 2024-10-18

## 2024-10-17 NOTE — PROGRESS NOTES
COMPARISON:  2022     FINDINGS:  Lumbar spine (L3-L4):               T-score is -1.7, and Z-score is -0.8.     Compared with previous DXA, BMD at the lumbar spine has remained stable.     Femoral neck:                          T-score is -0.9, and Z-score is -0.3.     Total hip:                                T-score is -1.1, and Z-score is -0.6.     Compared with previous DXA, BMD at the total hip has remained stable.     Distal 1/3 radius:                      Not applicable        Trabecular Bone Score     The trabecular bone score (TBS) indicates degraded bone quality.        Fracture Risk (FRAX adjusted for Trabecular Bone Score)     9.9% risk of a major osteoporotic fracture in the next 10 years.     0.7% risk of hip fracture in the next 10 years.     Impression:     *Osteoporosis based on low trauma hip or spine fracture     RECOMMENDATIONS:  *Daily calcium intake 5998-4440 mg, dietary sources preferred; Vitamin D 1625-4420 IU daily.  *Weight bearing exercise and fall precautions.  *Recommend medical therapy for osteoporosis with high risk for fracture. Consider bisphosphonates (alendronate, risedronate, zoledronic acid), or denosumab.  *Repeat BMD in 2 years.      Plan:  alendronate (FOSAMAX) 70 MG tablet 70 mg, Oral, Every 7 days 10/17/2024   12 tablet 3 ordered -- Elan Price MD          Med Note: Long-term: Dx Associated: Ordered Date & Time: 10/17/2024 0802Start: 10/17/2024Adh: Report     Ordering Department: [facility] INTERNAL MEDICINE  Order Details: Take 1 tablet (70 mg total) by mouth every 7 days. Dispense: 12 tablet, Refills: 3 ordered

## 2024-10-18 ENCOUNTER — PATIENT MESSAGE (OUTPATIENT)
Dept: TRANSPLANT | Facility: CLINIC | Age: 64
End: 2024-10-18
Payer: MEDICARE

## 2024-10-18 ENCOUNTER — TELEPHONE (OUTPATIENT)
Dept: HEPATOLOGY | Facility: CLINIC | Age: 64
End: 2024-10-18
Payer: MEDICARE

## 2024-10-18 ENCOUNTER — PATIENT MESSAGE (OUTPATIENT)
Dept: INTERNAL MEDICINE | Facility: CLINIC | Age: 64
End: 2024-10-18
Payer: MEDICARE

## 2024-10-18 DIAGNOSIS — M81.0 AGE-RELATED OSTEOPOROSIS WITHOUT CURRENT PATHOLOGICAL FRACTURE: Primary | ICD-10-CM

## 2024-10-21 NOTE — PROGRESS NOTES
FOLLOW-UP PATIENT VISIT    Subjective:      Chief Complaint: No chief complaint on file.      HPI: Kathie Quezada is a 64 y.o. female who is here for type 2 diabetes mellitus  PMH includes: ESRD s/p kidney transplant (05/2024) on immunosuppression with prednisone and tacrolimus, hypothyroidism, CAD, CVA, factor XII deficiency, hepatitis-C (s/p treatment), pulmonary hypertension, diastolic heart failure     LOV with PERLITA Rivero on 9/16/24  Patient went to the emergency department on 08/07/2024 for hyperglycemia. Transplant medicine consulted Endocrinology who evaluated patient in the ED. Insulin adjustments were made and she was given follow up with Endocrinology and discharged. Patient was not in DKA or HHS.      Regarding diabetes:    Initially Diagnosed with T2D: 2005, was on insulin but stopped it when she got her kidney transplant.     Known diabetic complications: nephropathy, retinopathy, peripheral neuropathy, and cardiovascular disease  Cardiovascular risk factors: diabetes mellitus, dyslipidemia, hypertension, and obesity (BMI >= 30 kg/m2)    Current diabetic medications include:   Lantus 18u qHS  Novolog 8 units TIDWM  MDSSI    Sometimes she does not take her lantus at night because her blood sugar is low.    Other medications tried:  Metformin - diarrhea    Diet:   Breakfast: egg sandwich with cheese, boiled egg, does not like to eat in the morning  Lunch: Turkey sandwich with cheese and matthew and 2 slices of bread  Dinner: Home cooked, cabbage, red beans, chicken  Snack: cookies, rice cakes, likes to snack at bedtime    Exercise: walking every day    Glucose Trends:  freestyle ban, not connected to the clinic      Average AM fasting -  120 and under      Average Lunch -  if she eats breakfast 150, if she doesn't eat less than 100   Average Dinner -  150 - 200   Average bedtime -  200s    In the evening and at night is when she eats her sweets.       Episodes of hypoglycemia? Yes in the  60-70s but denies symptoms - 2-3x a week, 2x a week wakes up with low blood glucose, Denies    Family Hx of DM:   Mother - DM2   Father - denies           Wt Readings from Last 10 Encounters:   10/22/24 106.4 kg (234 lb 7.4 oz)   10/11/24 101.2 kg (223 lb)   09/24/24 103.5 kg (228 lb 2.8 oz)   08/07/24 101.2 kg (223 lb)   07/31/24 102.8 kg (226 lb 10.1 oz)   07/17/24 101.9 kg (224 lb 10.4 oz)   07/08/24 102.2 kg (225 lb 5 oz)   06/19/24 104 kg (229 lb 4.5 oz)   06/19/24 104 kg (229 lb 4.5 oz)   06/12/24 104.5 kg (230 lb 4.3 oz)       Diabetes Management Status  Statin: Not taking due to myopathy, She is on Ezetimibe 10 mg daily  ACE/ARB: Not taking    Screening or Prevention Patient's value   HgA1C Testing and Control   Lab Results   Component Value Date    HGBA1C 5.5 05/23/2024    HGBA1C 5.3 08/01/2023    HGBA1C 5.2 02/02/2023    HGBA1C 5.5 11/17/2022    HGBA1C 6.8 (H) 09/07/2022    HGBA1C 6.2 (H) 12/21/2021    HGBA1C 5.7 (H) 12/01/2020    HGBA1C 5.9 (H) 11/03/2020    HGBA1C 5.6 05/13/2020    HGBA1C 5.7 (H) 01/09/2020        Lipid profile : 05/02/2024   LDL control Lab Results   Component Value Date    LDLCALC 90.6 05/02/2024      Nephropathy screening Lab Results   Component Value Date    LABMICR 1896.0 04/09/2018     Lab Results   Component Value Date    PROTEINUA Negative 08/19/2024      Blood pressure BP Readings from Last 1 Encounters:   10/22/24 120/60      Dilated retinal exam : 08/06/2024   Foot exam   : 09/24/2024        Regarding Postoperative Hypothyroidism:    - Duration of hypothyroidism: s/p total thyroidectomy over 5 years ago at [facility]  - Current relevant medications: levothyroxine T4 (Synthroid) 150 mcg daily  - Takes thyroid medications properly    - Most recent thyroid imaging:     - Most recent DEXA: 10/11/14, Osteoporosis based on low trauma spine fracture in 2010        She reports that she is cold all the time and unintentional weight gain and neck tightness    Regarding  Osteoporosis:    Recently prescribed alendronate but has not started to take it due to concern with her kidney.  Had teeth pulled last month. Is not planning to get any more pulled. Reports all her sockets are filled in.    ROS: See HPI    Past Medical History:   Diagnosis Date    Acute respiratory failure with hypoxia and hypercapnia 05/26/2019    ATN (acute tubular necrosis) 02/08/2019    Chronic diastolic heart failure 02/07/2019    2-8-19 Mild left atrial enlargement. Mild left ventricular enlargement. Normal left ventricular systolic function. The estimated ejection fraction is 55% Indeterminate left ventricular diastolic function. Normal right ventricular systolic function. Mild mitral regurgitation. Mild to moderate tricuspid regurgitation. The estimated PA systolic pressure is 32 mm Hg Normal central venous pressure (3 m    CKD (chronic kidney disease) stage 4, GFR 15-29 ml/min 05/16/2018    CKD stage G3a/A3, GFR 45-59 and albumin creatinine ratio >300 mg/g 05/16/2018    CKD stage G3b/A3, GFR 30-44 and albumin creatinine ratio >300 mg/g 05/16/2018    Closed compression fracture of L2 lumbar vertebra 05/24/2019    Closed compression fracture of L2 lumbar vertebra 05/24/2019     IMO Regulatory Load October 2019    Clotted dialysis access 07/22/2023    Controlled type 2 diabetes with retinopathy 04/09/2018    COVID-19 virus infection 7-1-2021 07/01/2021    CVA (cerebral vascular accident) 2017    Diabetic polyneuropathy associated with type 2 diabetes mellitus 12/04/2020    Encounter for blood transfusion     Essential hypertension 04/09/2018    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  7-31-19 Factor XI Activity 55 - 145 % 96       Gastroesophageal reflux disease without esophagitis 05/24/2019    Generalized anxiety disorder 11/08/2019    GERD (gastroesophageal reflux disease)     GIB (gastrointestinal bleeding) 10/18/2018    History of CVA (cerebrovascular accident) 11/27/2022    History  of CVA (cerebrovascular accident) without residual deficits 11/27/2022    History of hepatitis C     s/p succesful Rx w/ Harvoni  - SVR12 (cure) 2016    Hypertensive retinopathy, bilateral 12/10/2019    Mild nonproliferative diabetic retinopathy 05/02/2018    Mixed hyperlipidemia 04/09/2018    Myalgia due to statin 09/07/2022    Nephrotic range proteinuria 04/09/2018    Normocytic anemia 01/14/2022    NS (nuclear sclerosis), bilateral 12/10/2019    Peanut allergy 02/04/2023    PNA (pneumonia) 05/28/2019    Postoperative hypothyroidism     Prophylactic immunotherapy 05/24/2024    Pulmonary hypertension 02/06/2023    Renovascular hypertension 04/09/2018    S/P arteriovenous (AV) fistula creation 08/14/2019 8/14/19: L2 kyphoplasty with Dr. Kwon       S/P kidney transplant 05/24/2024    S/P kyphoplasty 08/14/2019 8/14/19: L2 kyphoplasty with Dr. Kwon     Slow transit constipation 05/27/2019    Type 2 diabetes mellitus with circulatory disorder, with long-term current use of insulin 04/09/2018    Type 2 diabetes mellitus with stage 3 chronic kidney disease, without long-term current use of insulin 04/09/2018    Type 2 diabetes mellitus with stage 3a chronic kidney disease, without long-term current use of insulin 03/03/2021    Type 2 diabetes mellitus with stage 3b chronic kidney disease, without long-term current use of insulin 09/07/2022       Past Surgical History:   Procedure Laterality Date    AV FISTULA PLACEMENT Left 10/27/2022    Procedure: CREATION, AV FISTULA;  Surgeon: Tirso Tavera MD;  Location: [facility] OR [address] [place] [address];  Service: Peripheral Vascular;  Laterality: Left;  Brachio basilic 1st stage     BACK SURGERY  2017    CHOLECYSTECTOMY  2015    [facility]    COLONOSCOPY  11/13/2015    Dr Chino torres prep. internal hemorrhoids. diverticulosis of sigmois. NO polyps. repeat due 2025    FISTULOGRAM N/A 12/16/2022    Procedure: Fistulogram;  Surgeon: Tirso Tavera MD;  Location: [facility] CATH LAB;  Service:  Peripheral Vascular;  Laterality: N/A;    FISTULOGRAM, WITH PTA Left 3/23/2023    Procedure: FISTULOGRAM, WITH PTA;  Surgeon: Tirso Tavera MD;  [facility];  Service: Peripheral Vascular;  Laterality: Left;  LUE TRANSRADIAL CO2 FISTULOGRAM    1.9 min  22.99 mGy  4.3032 Gy.cm  200cc CO2  9ml TransRadial Solution    FISTULOGRAM, WITH PTA Left 8/15/2023    Procedure: FISTULOGRAM, WITH PTA TRANSRADIAL;  Surgeon: Tirso Tavera MD;  [facility];  Service: Peripheral Vascular;  Laterality: Left;  Fluoro: 5.1 min, mGy: 7.95, Gycm2: 1.2626, contrast: 12ml    FIXATION KYPHOPLASTY Bilateral 08/14/2019    Procedure: L2 kyphoplasty Fluoro Globus;  Surgeon: Jeremie Kwon DO;  Location: [facility];  Service: Neurosurgery;  Laterality: Bilateral;  GLOBUS MACRINA WINSLOW [phone] TEXTED HER @ 10:54AM ON 7-30-19  PRE-OP-BY RN  8-7-2019    HYSTERECTOMY      KIDNEY TRANSPLANT N/A 5/23/2024    Procedure: TRANSPLANT, KIDNEY;  Surgeon: Stepan Bernardo MD;  [facility];  Service: Transplant;  Laterality: N/A;    OOPHORECTOMY  1996    one    PERCUTANEOUS TRANSLUMINAL ANGIOPLASTY OF ARTERIOVENOUS FISTULA Left 12/16/2022    Procedure: PTA, AV FISTULA;  Surgeon: Tirso Tavera MD;  Location: [facility] CATH LAB;  Service: Peripheral Vascular;  Laterality: Left;    PERCUTANEOUS TRANSLUMINAL ANGIOPLASTY OF ARTERIOVENOUS FISTULA Left 11/30/2023    Procedure: PTA, AV FISTULA;  Surgeon: Tirso Tavera MD;  Location: [facility] CATH LAB;  Service: Peripheral Vascular;  Laterality: Left;  left transradial fistulagram    RIGHT HEART CATHETERIZATION Right 6/15/2023    Procedure: INSERTION, CATHETER, RIGHT HEART;  Surgeon: Lucretia Rivas MD;  Location: [facility] CATH LAB;  Service: Cardiology;  Laterality: Right;    THYROIDECTOMY  2016    at [facility] for thyroid nodule    TONSILLECTOMY      TRANSPOSITION OF BASILIC VEIN Left 1/31/2023    Procedure: TRANSPOSITION, VEIN, BASILIC;  Surgeon: Tirso Tavera MD;  Location:  [facility] OR 2ND FLR;  Service: Peripheral Vascular;  Laterality: Left;       Reviewed past medical, family, social history and updated as appropriate.    Objective:     /60 (Patient Position: Sitting)   Pulse 79   Wt 106.4 kg (234 lb 7.4 oz)   SpO2 97%   BMI 34.62 kg/m²     BP Readings from Last 5 Encounters:   10/22/24 120/60   09/24/24 136/76   08/07/24 (!) 144/71   07/31/24 105/65   07/17/24 109/61       Body mass index is 34.62 kg/m².    Wt Readings from Last 3 Encounters:   10/22/24 0921 106.4 kg (234 lb 7.4 oz)   10/11/24 0819 101.2 kg (223 lb)   09/24/24 0834 103.5 kg (228 lb 2.8 oz)     Lab Review:   Lab Results   Component Value Date    HGBA1C 5.5 05/23/2024     Assessment/Plan:     Problem List Items Addressed This Visit          Endocrine    Postoperative hypothyroidism - Primary (Chronic)     On levothyroxine 150 mcg  - Needs updated TSH         Relevant Orders    TSH    Controlled type 2 diabetes mellitus with stable proliferative retinopathy of both eyes, unspecified whether long term insulin use (Chronic)     Type 2 diabetes, exacerbated by steroid use and immunosuppression after transplant. Current regimen of L18N6 is causing occasional hypoglycemia including one episode of noctural hypoglycemia. She has a freestyle ban that is not connected to the clinic.    Plan  - Start Mounjaro now, should help with weight loss and reduce insulin requirements to avoid hypoglycemia  - Decrease Lantus to 12 units nightly and novolog to 4 units nightly after starting mounjaro  - She is up to date on podiatry and optometry this year  - Needs to have her freestyle ban connected to the clinic (we were unable to do so this visit) so BG trends can be reviewed.  - If she needs any supplies will send a message through the portal         Relevant Medications    insulin aspart U-100 (NOVOLOG FLEXPEN U-100 INSULIN) 100 unit/mL (3 mL) InPn pen    insulin glargine U-100, Lantus, (LANTUS SOLOSTAR U-100 INSULIN) 100  unit/mL (3 mL) In pen       Orthopedic    Osteoporosis with current pathological fracture     Recommend starting the prescribed fosamax for osteoporosis especially in the setting of long term steroid use            Follow up in about 3 months (around 1/22/2025).    Visit today included increased complexity associated with the care of the episodic problem addressed and managing the longitudinal care of the patient due to the serious and/or complex managed problem(s).      Enrique Honeycutt DO  [facility] Department of Endocrinology    Office: [phone]  Fax: [phone]    The above history labs imaging impression and plan were discussed with attending physician who is in agreement and also took part in this patient's care.  I personally reviewed all of the patients available medications, labs, imaging, vitals, allergies, medical history.  Recommendations were discussed with the patient in detail  The patient verbalized understanding and agrees with the plan outlined as above.

## 2024-10-22 ENCOUNTER — OFFICE VISIT (OUTPATIENT)
Dept: ENDOCRINOLOGY | Facility: CLINIC | Age: 64
End: 2024-10-22
Payer: MEDICARE

## 2024-10-22 ENCOUNTER — LAB VISIT (OUTPATIENT)
Dept: LAB | Facility: HOSPITAL | Age: 64
End: 2024-10-22
Attending: INTERNAL MEDICINE
Payer: MEDICARE

## 2024-10-22 VITALS
BODY MASS INDEX: 34.62 KG/M2 | OXYGEN SATURATION: 97 % | DIASTOLIC BLOOD PRESSURE: 60 MMHG | HEART RATE: 79 BPM | WEIGHT: 234.44 LBS | SYSTOLIC BLOOD PRESSURE: 120 MMHG

## 2024-10-22 DIAGNOSIS — E89.0 POSTOPERATIVE HYPOTHYROIDISM: Primary | Chronic | ICD-10-CM

## 2024-10-22 DIAGNOSIS — M80.80XA OTHER OSTEOPOROSIS WITH CURRENT PATHOLOGICAL FRACTURE, INITIAL ENCOUNTER: ICD-10-CM

## 2024-10-22 DIAGNOSIS — Z94.0 S/P KIDNEY TRANSPLANT: ICD-10-CM

## 2024-10-22 DIAGNOSIS — Z94.0 KIDNEY REPLACED BY TRANSPLANT: ICD-10-CM

## 2024-10-22 DIAGNOSIS — E11.3553 CONTROLLED TYPE 2 DIABETES MELLITUS WITH STABLE PROLIFERATIVE RETINOPATHY OF BOTH EYES, UNSPECIFIED WHETHER LONG TERM INSULIN USE: Chronic | ICD-10-CM

## 2024-10-22 PROBLEM — M80.00XA OSTEOPOROSIS WITH CURRENT PATHOLOGICAL FRACTURE: Status: ACTIVE | Noted: 2022-10-11

## 2024-10-22 LAB
ALBUMIN SERPL BCP-MCNC: 3.4 G/DL (ref 3.5–5.2)
ANION GAP SERPL CALC-SCNC: 7 MMOL/L (ref 8–16)
ANISOCYTOSIS BLD QL SMEAR: SLIGHT
BASOPHILS # BLD AUTO: 0.07 K/UL (ref 0–0.2)
BASOPHILS NFR BLD: 0.6 % (ref 0–1.9)
BUN SERPL-MCNC: 19 MG/DL (ref 8–23)
CALCIUM SERPL-MCNC: 9.2 MG/DL (ref 8.7–10.5)
CHLORIDE SERPL-SCNC: 109 MMOL/L (ref 95–110)
CO2 SERPL-SCNC: 27 MMOL/L (ref 23–29)
CREAT SERPL-MCNC: 1.1 MG/DL (ref 0.5–1.4)
DIFFERENTIAL METHOD BLD: ABNORMAL
EOSINOPHIL # BLD AUTO: 0.2 K/UL (ref 0–0.5)
EOSINOPHIL NFR BLD: 2.1 % (ref 0–8)
ERYTHROCYTE [DISTWIDTH] IN BLOOD BY AUTOMATED COUNT: 13.1 % (ref 11.5–14.5)
EST. GFR  (NO RACE VARIABLE): 56.8 ML/MIN/1.73 M^2
GLUCOSE SERPL-MCNC: 109 MG/DL (ref 70–110)
HCT VFR BLD AUTO: 35.7 % (ref 37–48.5)
HGB BLD-MCNC: 11.4 G/DL (ref 12–16)
HYPOCHROMIA BLD QL SMEAR: ABNORMAL
IMM GRANULOCYTES # BLD AUTO: 0.5 K/UL (ref 0–0.04)
IMM GRANULOCYTES NFR BLD AUTO: 4.5 % (ref 0–0.5)
LYMPHOCYTES # BLD AUTO: 1.5 K/UL (ref 1–4.8)
LYMPHOCYTES NFR BLD: 13.5 % (ref 18–48)
MAGNESIUM SERPL-MCNC: 2 MG/DL (ref 1.6–2.6)
MCH RBC QN AUTO: 27.2 PG (ref 27–31)
MCHC RBC AUTO-ENTMCNC: 31.9 G/DL (ref 32–36)
MCV RBC AUTO: 85 FL (ref 82–98)
MONOCYTES # BLD AUTO: 0.8 K/UL (ref 0.3–1)
MONOCYTES NFR BLD: 7.6 % (ref 4–15)
NEUTROPHILS # BLD AUTO: 7.9 K/UL (ref 1.8–7.7)
NEUTROPHILS NFR BLD: 71.7 % (ref 38–73)
NRBC BLD-RTO: 0 /100 WBC
OVALOCYTES BLD QL SMEAR: ABNORMAL
PHOSPHATE SERPL-MCNC: 3.7 MG/DL (ref 2.7–4.5)
PLATELET # BLD AUTO: 169 K/UL (ref 150–450)
PMV BLD AUTO: ABNORMAL FL (ref 9.2–12.9)
POIKILOCYTOSIS BLD QL SMEAR: SLIGHT
POLYCHROMASIA BLD QL SMEAR: ABNORMAL
POTASSIUM SERPL-SCNC: 4.1 MMOL/L (ref 3.5–5.1)
RBC # BLD AUTO: 4.19 M/UL (ref 4–5.4)
SODIUM SERPL-SCNC: 143 MMOL/L (ref 136–145)
SPHEROCYTES BLD QL SMEAR: ABNORMAL
TACROLIMUS BLD-MCNC: 5.4 NG/ML (ref 5–15)
WBC # BLD AUTO: 11.04 K/UL (ref 3.9–12.7)

## 2024-10-22 PROCEDURE — 99999 PR PBB SHADOW E&M-EST. PATIENT-LVL IV: CPT | Mod: PBBFAC,HCNC,GC, | Performed by: STUDENT IN AN ORGANIZED HEALTH CARE EDUCATION/TRAINING PROGRAM

## 2024-10-22 PROCEDURE — 3074F SYST BP LT 130 MM HG: CPT | Mod: HCNC,CPTII,GC,S$GLB | Performed by: STUDENT IN AN ORGANIZED HEALTH CARE EDUCATION/TRAINING PROGRAM

## 2024-10-22 PROCEDURE — 80069 RENAL FUNCTION PANEL: CPT | Mod: HCNC | Performed by: INTERNAL MEDICINE

## 2024-10-22 PROCEDURE — 80197 ASSAY OF TACROLIMUS: CPT | Mod: HCNC | Performed by: INTERNAL MEDICINE

## 2024-10-22 PROCEDURE — G2211 COMPLEX E/M VISIT ADD ON: HCPCS | Mod: HCNC,GC,S$GLB, | Performed by: STUDENT IN AN ORGANIZED HEALTH CARE EDUCATION/TRAINING PROGRAM

## 2024-10-22 PROCEDURE — 99215 OFFICE O/P EST HI 40 MIN: CPT | Mod: HCNC,GC,S$GLB, | Performed by: STUDENT IN AN ORGANIZED HEALTH CARE EDUCATION/TRAINING PROGRAM

## 2024-10-22 PROCEDURE — 4010F ACE/ARB THERAPY RXD/TAKEN: CPT | Mod: HCNC,CPTII,GC,S$GLB | Performed by: STUDENT IN AN ORGANIZED HEALTH CARE EDUCATION/TRAINING PROGRAM

## 2024-10-22 PROCEDURE — 3008F BODY MASS INDEX DOCD: CPT | Mod: HCNC,CPTII,GC,S$GLB | Performed by: STUDENT IN AN ORGANIZED HEALTH CARE EDUCATION/TRAINING PROGRAM

## 2024-10-22 PROCEDURE — 3066F NEPHROPATHY DOC TX: CPT | Mod: HCNC,CPTII,GC,S$GLB | Performed by: STUDENT IN AN ORGANIZED HEALTH CARE EDUCATION/TRAINING PROGRAM

## 2024-10-22 PROCEDURE — 83735 ASSAY OF MAGNESIUM: CPT | Mod: HCNC | Performed by: INTERNAL MEDICINE

## 2024-10-22 PROCEDURE — 36415 COLL VENOUS BLD VENIPUNCTURE: CPT | Mod: HCNC | Performed by: INTERNAL MEDICINE

## 2024-10-22 PROCEDURE — 3044F HG A1C LEVEL LT 7.0%: CPT | Mod: HCNC,CPTII,GC,S$GLB | Performed by: STUDENT IN AN ORGANIZED HEALTH CARE EDUCATION/TRAINING PROGRAM

## 2024-10-22 PROCEDURE — 1159F MED LIST DOCD IN RCRD: CPT | Mod: HCNC,CPTII,GC,S$GLB | Performed by: STUDENT IN AN ORGANIZED HEALTH CARE EDUCATION/TRAINING PROGRAM

## 2024-10-22 PROCEDURE — 3078F DIAST BP <80 MM HG: CPT | Mod: HCNC,CPTII,GC,S$GLB | Performed by: STUDENT IN AN ORGANIZED HEALTH CARE EDUCATION/TRAINING PROGRAM

## 2024-10-22 PROCEDURE — 85025 COMPLETE CBC W/AUTO DIFF WBC: CPT | Mod: HCNC | Performed by: INTERNAL MEDICINE

## 2024-10-22 RX ORDER — TIRZEPATIDE 5 MG/.5ML
INJECTION, SOLUTION SUBCUTANEOUS
Qty: 6 ML | Refills: 0 | Status: SHIPPED | OUTPATIENT
Start: 2024-10-22

## 2024-10-22 RX ORDER — TIRZEPATIDE 2.5 MG/.5ML
INJECTION, SOLUTION SUBCUTANEOUS
Qty: 2 ML | Refills: 0 | Status: SHIPPED | OUTPATIENT
Start: 2024-10-22

## 2024-10-22 RX ORDER — INSULIN GLARGINE 100 [IU]/ML
12 INJECTION, SOLUTION SUBCUTANEOUS NIGHTLY
Qty: 6 ML | Refills: 2 | Status: SHIPPED | OUTPATIENT
Start: 2024-10-22 | End: 2025-01-20

## 2024-10-22 RX ORDER — TACROLIMUS 1 MG/1
CAPSULE ORAL
Qty: 240 CAPSULE | Refills: 11 | Status: SHIPPED | OUTPATIENT
Start: 2024-10-22

## 2024-10-22 RX ORDER — INSULIN ASPART 100 [IU]/ML
INJECTION, SOLUTION INTRAVENOUS; SUBCUTANEOUS
Qty: 6 ML | Refills: 2 | Status: SHIPPED | OUTPATIENT
Start: 2024-10-22

## 2024-10-22 NOTE — PROGRESS NOTES
I have seen the patient, reviewed the fellow's history and physical, assessment, plan, and progress note. I have personally interviewed and examined the patient at bedside and agree with the findings.         Varun Spears MD  Endocrinology Staff

## 2024-10-22 NOTE — TELEPHONE ENCOUNTER
Message sent  Labs tuesday  ----- Message from Leticia Lares DO sent at 10/22/2024 12:49 PM CDT -----  Low FK. Is it a true FK trough? If so, increase FK to 3 mg BID from Fk 3/2

## 2024-10-22 NOTE — ASSESSMENT & PLAN NOTE
Type 2 diabetes, exacerbated by steroid use and immunosuppression after transplant. Current regimen of L18N6 is causing occasional hypoglycemia including one episode of noctural hypoglycemia. She has a freestyle ban that is not connected to the clinic.    Plan  - Start Mounjaro now, should help with weight loss and reduce insulin requirements to avoid hypoglycemia  - Decrease Lantus to 12 units nightly and novolog to 4 units nightly after starting mounjaro  - She is up to date on podiatry and optometry this year  - Needs to have her freestyle ban connected to the clinic (we were unable to do so this visit) so BG trends can be reviewed.  - If she needs any supplies will send a message through the portal

## 2024-10-22 NOTE — ASSESSMENT & PLAN NOTE
Recommend starting the prescribed fosamax for osteoporosis especially in the setting of long term steroid use

## 2024-10-23 ENCOUNTER — TELEPHONE (OUTPATIENT)
Dept: ENDOCRINOLOGY | Facility: CLINIC | Age: 64
End: 2024-10-23
Payer: MEDICARE

## 2024-10-23 DIAGNOSIS — E11.22 TYPE 2 DIABETES MELLITUS WITH STAGE 3B CHRONIC KIDNEY DISEASE, WITH LONG-TERM CURRENT USE OF INSULIN: Primary | ICD-10-CM

## 2024-10-23 DIAGNOSIS — Z79.4 TYPE 2 DIABETES MELLITUS WITH STAGE 3B CHRONIC KIDNEY DISEASE, WITH LONG-TERM CURRENT USE OF INSULIN: Primary | ICD-10-CM

## 2024-10-23 DIAGNOSIS — E89.0 POSTOPERATIVE HYPOTHYROIDISM: ICD-10-CM

## 2024-10-23 DIAGNOSIS — N18.32 TYPE 2 DIABETES MELLITUS WITH STAGE 3B CHRONIC KIDNEY DISEASE, WITH LONG-TERM CURRENT USE OF INSULIN: Primary | ICD-10-CM

## 2024-10-23 RX ORDER — LEVOTHYROXINE SODIUM 125 UG/1
125 TABLET ORAL
Qty: 30 TABLET | Refills: 11 | Status: SHIPPED | OUTPATIENT
Start: 2024-10-23 | End: 2025-10-23

## 2024-10-23 NOTE — TELEPHONE ENCOUNTER
Spoke with pt over the phone regarding TFTs. Plan to decrease levothyroxine to 125 mcg daily and recheck TFTs in 6 weeks.

## 2024-10-28 ENCOUNTER — PATIENT MESSAGE (OUTPATIENT)
Dept: INTERNAL MEDICINE | Facility: CLINIC | Age: 64
End: 2024-10-28
Payer: MEDICARE

## 2024-10-29 ENCOUNTER — LAB VISIT (OUTPATIENT)
Dept: LAB | Facility: HOSPITAL | Age: 64
End: 2024-10-29
Payer: MEDICARE

## 2024-10-29 DIAGNOSIS — Z94.0 KIDNEY REPLACED BY TRANSPLANT: ICD-10-CM

## 2024-10-29 LAB — TACROLIMUS BLD-MCNC: 9.8 NG/ML (ref 5–15)

## 2024-10-29 PROCEDURE — 36415 COLL VENOUS BLD VENIPUNCTURE: CPT | Mod: HCNC | Performed by: INTERNAL MEDICINE

## 2024-10-29 PROCEDURE — 80197 ASSAY OF TACROLIMUS: CPT | Mod: HCNC | Performed by: INTERNAL MEDICINE

## 2024-11-01 NOTE — PROGRESS NOTES
Kidney Post-Transplant Assessment    Referring Physician: Fred Kelly  Current Nephrologist: Fred Kelly    ORGAN: LEFT KIDNEY  Donor Type: living  PHS Increased Risk: no  Cold Ischemia: 60 mins  Induction Medications:     Subjective:     CC:  Reassessment of renal allograft function and management of chronic immunosuppression.    HPI:  Ms. Quezada is a 64 y.o. year old Black or  female with history of ESRD secondary to DM2 who received a living kidney transplant on 5/23/24. She has a history of CVA in 2017 without any deficit. She also has factor XII deficiency, history of hep C (s/p treatment with harvoni), and pHTN (RHC performed on 6/7/23 and found to have group 2 HTN. TTE and cardiac PET with preserved EF).  Her most recent creatinine is 1.4. She takes mycophenolate mofetil, prednisone, and tacrolimus for maintenance immunosuppression. Her post transplant course has been uncomplicated to date.  -Due to factor XII deficiency she received 1 unit of FFP in OR and 1 unit of FFP on POD#1. She received standard DVT prophylaxis while inpatient and was transitioned to prophylaxis lovenox x 2 weeks on discharge.     Interval HX:   Reports doing well. Reports she thinks she had a worm evacuate with her stool. She felt something wiggling and had some itching..     Reports adequate Intake  UOP-no problems reported   Peripheral edema-no  Appetite-denies N/V/D; tolerating IS meds well   Reports being more active. Cooking for the whole family. Feeling stronger.   BP  BP Readings from Last 3 Encounters:   11/04/24 (!) 162/76   10/22/24 120/60   09/24/24 136/76   Home BPs 140/80 high    Lab /diagnostic results reviewed with patient today.   All questions answered       Past Medical History:   Diagnosis Date    Acute respiratory failure with hypoxia and hypercapnia 05/26/2019    ATN (acute tubular necrosis) 02/08/2019    Chronic diastolic heart failure 02/07/2019    2-8-19 Mild left atrial enlargement.  Mild left ventricular enlargement. Normal left ventricular systolic function. The estimated ejection fraction is 55% Indeterminate left ventricular diastolic function. Normal right ventricular systolic function. Mild mitral regurgitation. Mild to moderate tricuspid regurgitation. The estimated PA systolic pressure is 32 mm Hg Normal central venous pressure (3 m    CKD (chronic kidney disease) stage 4, GFR 15-29 ml/min 05/16/2018    CKD stage G3a/A3, GFR 45-59 and albumin creatinine ratio >300 mg/g 05/16/2018    CKD stage G3b/A3, GFR 30-44 and albumin creatinine ratio >300 mg/g 05/16/2018    Closed compression fracture of L2 lumbar vertebra 05/24/2019    Closed compression fracture of L2 lumbar vertebra 05/24/2019     IMO Regulatory Load October 2019    Clotted dialysis access 07/22/2023    Controlled type 2 diabetes with retinopathy 04/09/2018    COVID-19 virus infection 7-1-2021 07/01/2021    CVA (cerebral vascular accident) 2017    Diabetic polyneuropathy associated with type 2 diabetes mellitus 12/04/2020    Encounter for blood transfusion     Essential hypertension 04/09/2018    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  7-31-19 Factor XI Activity 55 - 145 % 96       Gastroesophageal reflux disease without esophagitis 05/24/2019    Generalized anxiety disorder 11/08/2019    GERD (gastroesophageal reflux disease)     GIB (gastrointestinal bleeding) 10/18/2018    History of CVA (cerebrovascular accident) 11/27/2022    History of CVA (cerebrovascular accident) without residual deficits 11/27/2022    History of hepatitis C     s/p succesful Rx w/ Harvoni  - SVR12 (cure) 2016    Hypertensive retinopathy, bilateral 12/10/2019    Mild nonproliferative diabetic retinopathy 05/02/2018    Mixed hyperlipidemia 04/09/2018    Myalgia due to statin 09/07/2022    Nephrotic range proteinuria 04/09/2018    Normocytic anemia 01/14/2022    NS (nuclear sclerosis), bilateral 12/10/2019    Peanut allergy  "02/04/2023    PNA (pneumonia) 05/28/2019    Postoperative hypothyroidism     Prophylactic immunotherapy 05/24/2024    Pulmonary hypertension 02/06/2023    Renovascular hypertension 04/09/2018    S/P arteriovenous (AV) fistula creation 08/14/2019 8/14/19: L2 kyphoplasty with Dr. Kwon       S/P kidney transplant 05/24/2024    S/P kyphoplasty 08/14/2019 8/14/19: L2 kyphoplasty with Dr. Kwon     Slow transit constipation 05/27/2019    Type 2 diabetes mellitus with circulatory disorder, with long-term current use of insulin 04/09/2018    Type 2 diabetes mellitus with stage 3 chronic kidney disease, without long-term current use of insulin 04/09/2018    Type 2 diabetes mellitus with stage 3a chronic kidney disease, without long-term current use of insulin 03/03/2021    Type 2 diabetes mellitus with stage 3b chronic kidney disease, without long-term current use of insulin 09/07/2022       Review of Systems   Constitutional:  Negative for activity change and fever.   Eyes:  Positive for visual disturbance.        Wears glasses   Respiratory:  Negative for shortness of breath.    Cardiovascular:  Negative for chest pain and leg swelling.   Gastrointestinal:  Negative for constipation, diarrhea and nausea.   Genitourinary:  Negative for difficulty urinating, frequency and hematuria.   Musculoskeletal:  Negative for arthralgias and myalgias.   Allergic/Immunologic: Positive for immunocompromised state.   Neurological:  Negative for weakness and numbness.   Psychiatric/Behavioral:  Negative for sleep disturbance. The patient is not nervous/anxious.        Objective:   Blood pressure (!) 162/76, pulse 87, temperature 97.2 °F (36.2 °C), temperature source Temporal, height 5' 9" (1.753 m), weight 106.7 kg (235 lb 3.7 oz), SpO2 98%.body mass index is 34.74 kg/m².    Physical Exam  Constitutional:       General: She is not in acute distress.     Appearance: She is well-developed. She is obese. She is not diaphoretic. "   Cardiovascular:      Rate and Rhythm: Normal rate and regular rhythm.      Heart sounds: Normal heart sounds.   Pulmonary:      Effort: Pulmonary effort is normal.      Breath sounds: Normal breath sounds.   Abdominal:      General: Bowel sounds are normal.      Palpations: Abdomen is soft.   Musculoskeletal:         General: No tenderness. Normal range of motion.   Skin:     General: Skin is warm and dry.      Findings: No rash.      Nails: There is no clubbing.   Neurological:      Mental Status: She is alert and oriented to person, place, and time.   Psychiatric:         Behavior: Behavior normal.         Labs:  Lab Results   Component Value Date    WBC 11.04 10/22/2024    HGB 11.4 (L) 10/22/2024    HCT 35.7 (L) 10/22/2024     10/22/2024    K 4.1 10/22/2024     10/22/2024    CO2 27 10/22/2024    BUN 19 10/22/2024    CREATININE 1.1 10/22/2024    EGFRNORACEVR 56.8 (A) 10/22/2024    GLUCOSE 79 10/23/2020    CALCIUM 9.2 10/22/2024    PHOS 3.7 10/22/2024    MG 2.0 10/22/2024    ALBUMIN 3.4 (L) 10/22/2024    AST 15 08/07/2024    ALT 8 (L) 08/07/2024    UTPCR Unable to calculate 07/22/2024    .7 (H) 08/10/2023    TACROLIMUS 9.8 10/29/2024       Labs were reviewed with the patient    Assessment:     1. S/P kidney transplant 5/23/24.    2. Renovascular hypertension    3. Long-term use of immunosuppressant medication        Plan:   Routine f/u with virtual visit    Obtain stool studies-- reports possible worms in stool        1. Immunosuppression: Prograf trough 9.8,  Continue Prograf 3/3  MMF 1000 mg BID, and Prednisone taper. Will continue to monitor for drug toxicities  Atovaquone 1500 mg Qd for PCP prophylaxis       2. Allograft Function: ckd 3b, Stable. Continue good oral hydration.   - ESRD secondary to DM2 s/p living kidney transplant on 5/23/24.   - post transplant course has been uncomplicated to date.  -BL ~1.3-1.4  Lab Results   Component Value Date    CREATININE 1.1 10/22/2024      Latest  Reference Range & Units 4mo 4wk,  Kidney-Post 1 Year  10/22/24   Creatinine 0.5 - 1.4 mg/dL 1.1   eGFR >60 mL/min/1.73 m^2 56.8 !   Glucose 70 - 110 mg/dL 109       DM : cont insulin as prescribed --mgmt deferred to endocrinology  Lab Results   Component Value Date    HGBA1C 5.5 05/23/2024       3. Hypertension management: advise low salt diet and home BP monitoring    Coreg, nifedipine    4. Metabolic Bone Disease/Secondary Hyperparathyroidism:  Vit D   Lab Results   Component Value Date    .7 (H) 08/10/2023    CALCIUM 9.2 10/22/2024    PHOS 3.7 10/22/2024      Latest Reference Range & Units 4mo 4wk,  Kidney-Post 1 Year  10/22/24   Magnesium  1.6 - 2.6 mg/dL 2.0         5. Electrolytes: stable. No need for intervention   Lab Results   Component Value Date     10/22/2024    K 4.1 10/22/2024     10/22/2024    CO2 27 10/22/2024       6. Anemia: stable. No need for intervention   Lab Results   Component Value Date    WBC 11.04 10/22/2024    HGB 11.4 (L) 10/22/2024    HCT 35.7 (L) 10/22/2024    MCV 85 10/22/2024     10/22/2024         7. Proteinuria: continue p/c ratio as per guidelines   [facility]  7/22/24 [facility]        8. BK virus infection screening:  BK PCR per protocol   6/13/24  not detected <125    9. Weight education: provided during the clinic visit   Body mass index is 34.74 kg/m².     10. Patient safety education regarding immunosuppression including prophylaxis posttransplant for CMV, PCP    PCP ppx: Atovaquone until 11/19/24  CMV ppx: Valcyte until 8/21/24     Follow-up:   Clinic: return to transplant clinic weekly for the first month after transplant; every 2 weeks during months 2-3; then at 6-, 9-, 12-, 18-, 24-, and 36- months post-transplant to reassess for complications from immunosuppression toxicity and monitor for rejection.  Annually thereafter.    Labs: since patient remains at high risk for rejection and drug-related complications that warrant close monitoring, labs will be  ordered as follows: continue twice weekly CBC, renal panel, and drug level for first month; then same labs once weekly through 3rd month post-transplant.  Urine for UA and protein/creatinine ratio monthly.  Serum BK - PCR at 1-, 3-, 6-, 9-, 12-, 18-, 24-, 36-, 48-, and 60 months post-transplant.  Hepatic panel at 1-, 2-, 3-, 6-, 9-, 12-, 18-, 24-, and 36- months post-transplant.    Maria Del Carmen Holland NP       Education:   Material provided to the patient.  Patient reminded to call with any health changes since these can be early signs of significant complications.  Also, I advised the patient to be sure any new medications or changes of old medications are discussed with either a pharmacist or physician knowledgeable with transplant to avoid rejection/drug toxicity related to significant drug interactions.    Patient advised that it is recommended that all transplanted patients, and their close contacts and household members receive Covid vaccination.

## 2024-11-04 ENCOUNTER — OFFICE VISIT (OUTPATIENT)
Dept: TRANSPLANT | Facility: CLINIC | Age: 64
End: 2024-11-04
Payer: MEDICARE

## 2024-11-04 VITALS
BODY MASS INDEX: 34.84 KG/M2 | WEIGHT: 235.25 LBS | DIASTOLIC BLOOD PRESSURE: 76 MMHG | HEIGHT: 69 IN | OXYGEN SATURATION: 98 % | SYSTOLIC BLOOD PRESSURE: 162 MMHG | HEART RATE: 87 BPM | TEMPERATURE: 97 F

## 2024-11-04 DIAGNOSIS — Z79.60 LONG-TERM USE OF IMMUNOSUPPRESSANT MEDICATION: ICD-10-CM

## 2024-11-04 DIAGNOSIS — I15.0 RENOVASCULAR HYPERTENSION: ICD-10-CM

## 2024-11-04 DIAGNOSIS — Z94.0 S/P KIDNEY TRANSPLANT: Primary | ICD-10-CM

## 2024-11-04 PROCEDURE — 3008F BODY MASS INDEX DOCD: CPT | Mod: HCNC,CPTII,S$GLB, | Performed by: NURSE PRACTITIONER

## 2024-11-04 PROCEDURE — 3078F DIAST BP <80 MM HG: CPT | Mod: HCNC,CPTII,S$GLB, | Performed by: NURSE PRACTITIONER

## 2024-11-04 PROCEDURE — 1159F MED LIST DOCD IN RCRD: CPT | Mod: HCNC,CPTII,S$GLB, | Performed by: NURSE PRACTITIONER

## 2024-11-04 PROCEDURE — 4010F ACE/ARB THERAPY RXD/TAKEN: CPT | Mod: HCNC,CPTII,S$GLB, | Performed by: NURSE PRACTITIONER

## 2024-11-04 PROCEDURE — 99999 PR PBB SHADOW E&M-EST. PATIENT-LVL IV: CPT | Mod: PBBFAC,HCNC,, | Performed by: NURSE PRACTITIONER

## 2024-11-04 PROCEDURE — 99215 OFFICE O/P EST HI 40 MIN: CPT | Mod: HCNC,S$GLB,, | Performed by: NURSE PRACTITIONER

## 2024-11-04 PROCEDURE — 3044F HG A1C LEVEL LT 7.0%: CPT | Mod: HCNC,CPTII,S$GLB, | Performed by: NURSE PRACTITIONER

## 2024-11-04 PROCEDURE — 3077F SYST BP >= 140 MM HG: CPT | Mod: HCNC,CPTII,S$GLB, | Performed by: NURSE PRACTITIONER

## 2024-11-04 PROCEDURE — 3066F NEPHROPATHY DOC TX: CPT | Mod: HCNC,CPTII,S$GLB, | Performed by: NURSE PRACTITIONER

## 2024-11-04 NOTE — LETTER
November 4, 2024        Fred Kelly  [address]  [facility] [address]  Phone: [phone]  Fax: [phone]             [facility]  [address]  [facility] [address]  Phone: [phone]   Patient: Kathie Quezada   MR Number: [id number]   YOB: 1960   Date of Visit: 11/4/2024       Dear Dr. Fred Kelly    Thank you for referring Kathie Quezada to me for evaluation. Attached you will find relevant portions of my assessment and plan of care.    If you have questions, please do not hesitate to call me. I look forward to following Kathie Quezada along with you.    Sincerely,    Maria Del Carmen Holland NP    Enclosure    If you would like to receive this communication electronically, please contact [email or web address] or [phone] to request [employer] access.    [employer] is a tool which provides read-only access to select patient information with whom you have a relationship. Its easy to use and provides real time access to review your patients record including encounter summaries, notes, results, and demographic information.    If you feel you have received this communication in error or would no longer like to receive these types of communications, please e-mail [email or web address]

## 2024-11-06 ENCOUNTER — PATIENT MESSAGE (OUTPATIENT)
Dept: TRANSPLANT | Facility: CLINIC | Age: 64
End: 2024-11-06
Payer: MEDICARE

## 2024-11-06 DIAGNOSIS — Z94.0 S/P KIDNEY TRANSPLANT: Primary | ICD-10-CM

## 2024-11-06 DIAGNOSIS — R19.7 DIARRHEA, UNSPECIFIED TYPE: ICD-10-CM

## 2024-11-06 RX ORDER — MYCOPHENOLATE MOFETIL 250 MG/1
750 CAPSULE ORAL 2 TIMES DAILY
Qty: 180 CAPSULE | Refills: 11 | Status: SHIPPED | OUTPATIENT
Start: 2024-11-06 | End: 2025-11-06

## 2024-11-08 ENCOUNTER — PATIENT OUTREACH (OUTPATIENT)
Dept: EMERGENCY MEDICINE | Facility: HOSPITAL | Age: 64
End: 2024-11-08
Payer: MEDICARE

## 2024-11-08 NOTE — PROGRESS NOTES
Call placed to f/u from Pt's last encounter. She states she has been doing ok and her BS has been WNL. Pt denies having any concerns or wanting to schedule any f/u appt's. ED navigator will follow-up to assist and remind of appt's as needed.

## 2024-11-16 DIAGNOSIS — M79.10 MYALGIA DUE TO STATIN: ICD-10-CM

## 2024-11-16 DIAGNOSIS — E78.2 MIXED HYPERLIPIDEMIA: ICD-10-CM

## 2024-11-16 DIAGNOSIS — T46.6X5A MYALGIA DUE TO STATIN: ICD-10-CM

## 2024-11-17 RX ORDER — EZETIMIBE 10 MG/1
10 TABLET ORAL DAILY
Qty: 90 TABLET | Refills: 1 | Status: SHIPPED | OUTPATIENT
Start: 2024-11-17 | End: 2025-05-16

## 2024-11-17 NOTE — TELEPHONE ENCOUNTER
No care due was identified.  [facility] Embedded Care Due Messages. Reference number: [id number].   11/16/2024 9:00:44 PM CST

## 2024-11-17 NOTE — TELEPHONE ENCOUNTER
Refill Authorization Note     Refill Decision Note   Kathie Quezada  is requesting a refill authorization.  Brief Assessment and Rationale for Refill:  Approve     Medication Therapy Plan:       Medication Reconciliation Completed: No   Comments:     No Care Gaps recommended.     Note composed:10:20 AM 11/17/2024

## 2024-12-02 ENCOUNTER — LAB VISIT (OUTPATIENT)
Dept: LAB | Facility: HOSPITAL | Age: 64
End: 2024-12-02
Attending: INTERNAL MEDICINE
Payer: MEDICARE

## 2024-12-02 DIAGNOSIS — Z94.0 KIDNEY REPLACED BY TRANSPLANT: ICD-10-CM

## 2024-12-02 LAB
ALBUMIN SERPL BCP-MCNC: 3.6 G/DL (ref 3.5–5.2)
ANION GAP SERPL CALC-SCNC: 8 MMOL/L (ref 8–16)
BASOPHILS # BLD AUTO: ABNORMAL K/UL (ref 0–0.2)
BASOPHILS NFR BLD: 1 % (ref 0–1.9)
BUN SERPL-MCNC: 12 MG/DL (ref 8–23)
CALCIUM SERPL-MCNC: 9.1 MG/DL (ref 8.7–10.5)
CHLORIDE SERPL-SCNC: 108 MMOL/L (ref 95–110)
CO2 SERPL-SCNC: 26 MMOL/L (ref 23–29)
CREAT SERPL-MCNC: 1.3 MG/DL (ref 0.5–1.4)
DIFFERENTIAL METHOD BLD: ABNORMAL
EOSINOPHIL # BLD AUTO: ABNORMAL K/UL (ref 0–0.5)
EOSINOPHIL NFR BLD: 1 % (ref 0–8)
ERYTHROCYTE [DISTWIDTH] IN BLOOD BY AUTOMATED COUNT: 13.6 % (ref 11.5–14.5)
EST. GFR  (NO RACE VARIABLE): 46.5 ML/MIN/1.73 M^2
GLUCOSE SERPL-MCNC: 113 MG/DL (ref 70–110)
HCT VFR BLD AUTO: 38.2 % (ref 37–48.5)
HGB BLD-MCNC: 12.3 G/DL (ref 12–16)
IMM GRANULOCYTES # BLD AUTO: ABNORMAL K/UL (ref 0–0.04)
IMM GRANULOCYTES NFR BLD AUTO: ABNORMAL % (ref 0–0.5)
LYMPHOCYTES # BLD AUTO: ABNORMAL K/UL (ref 1–4.8)
LYMPHOCYTES NFR BLD: 21 % (ref 18–48)
MAGNESIUM SERPL-MCNC: 2 MG/DL (ref 1.6–2.6)
MCH RBC QN AUTO: 27.2 PG (ref 27–31)
MCHC RBC AUTO-ENTMCNC: 32.2 G/DL (ref 32–36)
MCV RBC AUTO: 85 FL (ref 82–98)
MONOCYTES # BLD AUTO: ABNORMAL K/UL (ref 0.3–1)
MONOCYTES NFR BLD: 6 % (ref 4–15)
MYELOCYTES NFR BLD MANUAL: 1 %
NEUTROPHILS NFR BLD: 65 % (ref 38–73)
NEUTS BAND NFR BLD MANUAL: 5 %
NRBC BLD-RTO: 0 /100 WBC
OVALOCYTES BLD QL SMEAR: ABNORMAL
PHOSPHATE SERPL-MCNC: 3.2 MG/DL (ref 2.7–4.5)
PLATELET # BLD AUTO: 204 K/UL (ref 150–450)
PLATELET BLD QL SMEAR: ABNORMAL
PMV BLD AUTO: 11.6 FL (ref 9.2–12.9)
POTASSIUM SERPL-SCNC: 4.1 MMOL/L (ref 3.5–5.1)
RBC # BLD AUTO: 4.52 M/UL (ref 4–5.4)
SODIUM SERPL-SCNC: 142 MMOL/L (ref 136–145)
TACROLIMUS BLD-MCNC: 9 NG/ML (ref 5–15)
WBC # BLD AUTO: 7.79 K/UL (ref 3.9–12.7)

## 2024-12-02 PROCEDURE — 80197 ASSAY OF TACROLIMUS: CPT | Mod: HCNC | Performed by: INTERNAL MEDICINE

## 2024-12-02 PROCEDURE — 36415 COLL VENOUS BLD VENIPUNCTURE: CPT | Mod: HCNC | Performed by: INTERNAL MEDICINE

## 2024-12-02 PROCEDURE — 83735 ASSAY OF MAGNESIUM: CPT | Mod: HCNC | Performed by: INTERNAL MEDICINE

## 2024-12-02 PROCEDURE — 85027 COMPLETE CBC AUTOMATED: CPT | Mod: HCNC | Performed by: INTERNAL MEDICINE

## 2024-12-02 PROCEDURE — 80069 RENAL FUNCTION PANEL: CPT | Mod: HCNC | Performed by: INTERNAL MEDICINE

## 2024-12-02 PROCEDURE — 85007 BL SMEAR W/DIFF WBC COUNT: CPT | Mod: HCNC | Performed by: INTERNAL MEDICINE

## 2024-12-24 DIAGNOSIS — Z94.0 S/P KIDNEY TRANSPLANT: ICD-10-CM

## 2024-12-24 RX ORDER — TACROLIMUS 1 MG/1
CAPSULE ORAL
Qty: 240 CAPSULE | Refills: 11 | Status: SHIPPED | OUTPATIENT
Start: 2024-12-24

## 2024-12-24 NOTE — TELEPHONE ENCOUNTER
Pt aware  Labs 1/6  ----- Message from Leticia Lares DO sent at 12/24/2024 11:27 AM CST -----  High FK. Confirm that it is a true FK trough? If so, decrease FK to 3/2 from 3 BID and recheck in 2 weeks

## 2024-12-31 ENCOUNTER — HOSPITAL ENCOUNTER (EMERGENCY)
Facility: HOSPITAL | Age: 64
Discharge: HOME | End: 2025-01-01
Attending: EMERGENCY MEDICINE
Payer: MEDICARE

## 2024-12-31 DIAGNOSIS — I20.89 ANGINAL CHEST PAIN AT REST: ICD-10-CM

## 2024-12-31 DIAGNOSIS — N76.6 GENITAL ULCER, FEMALE: Primary | ICD-10-CM

## 2024-12-31 DIAGNOSIS — I50.30 DIASTOLIC CHF: ICD-10-CM

## 2024-12-31 PROBLEM — N94.9 GENITAL LESION, FEMALE: Status: ACTIVE | Noted: 2024-12-31

## 2024-12-31 LAB
ADENOVIRUS: NOT DETECTED
ALBUMIN SERPL BCP-MCNC: 3.7 G/DL (ref 3.5–5.2)
ALP SERPL-CCNC: 95 U/L (ref 40–150)
ALT SERPL W/O P-5'-P-CCNC: 9 U/L (ref 10–44)
ANION GAP SERPL CALC-SCNC: 10 MMOL/L (ref 8–16)
AST SERPL-CCNC: 10 U/L (ref 10–40)
BACTERIA #/AREA URNS AUTO: ABNORMAL /HPF
BASOPHILS # BLD AUTO: 0.04 K/UL (ref 0–0.2)
BASOPHILS NFR BLD: 0.4 % (ref 0–1.9)
BILIRUB SERPL-MCNC: 0.7 MG/DL (ref 0.1–1)
BILIRUB UR QL STRIP: NEGATIVE
BNP SERPL-MCNC: 113 PG/ML (ref 0–99)
BORDETELLA PARAPERTUSSIS (IS1001): NOT DETECTED
BORDETELLA PERTUSSIS (PTXP): NOT DETECTED
BUN SERPL-MCNC: 10 MG/DL (ref 8–23)
CALCIUM SERPL-MCNC: 9 MG/DL (ref 8.7–10.5)
CHLAMYDIA PNEUMONIAE: NOT DETECTED
CHLORIDE SERPL-SCNC: 105 MMOL/L (ref 95–110)
CLARITY UR REFRACT.AUTO: CLEAR
CO2 SERPL-SCNC: 23 MMOL/L (ref 23–29)
COLOR UR AUTO: YELLOW
CORONAVIRUS 229E, COMMON COLD VIRUS: NOT DETECTED
CORONAVIRUS HKU1, COMMON COLD VIRUS: NOT DETECTED
CORONAVIRUS NL63, COMMON COLD VIRUS: NOT DETECTED
CORONAVIRUS OC43, COMMON COLD VIRUS: NOT DETECTED
CREAT SERPL-MCNC: 1.1 MG/DL (ref 0.5–1.4)
DIFFERENTIAL METHOD BLD: ABNORMAL
EOSINOPHIL # BLD AUTO: 0 K/UL (ref 0–0.5)
EOSINOPHIL NFR BLD: 0.3 % (ref 0–8)
ERYTHROCYTE [DISTWIDTH] IN BLOOD BY AUTOMATED COUNT: 13.3 % (ref 11.5–14.5)
EST. GFR  (NO RACE VARIABLE): 56.8 ML/MIN/1.73 M^2
FLUBV RNA NPH QL NAA+NON-PROBE: NOT DETECTED
GLUCOSE SERPL-MCNC: 146 MG/DL (ref 70–110)
GLUCOSE UR QL STRIP: NEGATIVE
HCT VFR BLD AUTO: 36.8 % (ref 37–48.5)
HGB BLD-MCNC: 11.7 G/DL (ref 12–16)
HGB UR QL STRIP: NEGATIVE
HIV 1+2 AB+HIV1 P24 AG SERPL QL IA: NORMAL
HPIV1 RNA NPH QL NAA+NON-PROBE: NOT DETECTED
HPIV2 RNA NPH QL NAA+NON-PROBE: NOT DETECTED
HPIV3 RNA NPH QL NAA+NON-PROBE: NOT DETECTED
HPIV4 RNA NPH QL NAA+NON-PROBE: NOT DETECTED
HUMAN METAPNEUMOVIRUS: NOT DETECTED
IMM GRANULOCYTES # BLD AUTO: 0.04 K/UL (ref 0–0.04)
IMM GRANULOCYTES NFR BLD AUTO: 0.4 % (ref 0–0.5)
INFLUENZA A (SUBTYPES H1,H1-2009,H3): NOT DETECTED
KETONES UR QL STRIP: NEGATIVE
LEUKOCYTE ESTERASE UR QL STRIP: ABNORMAL
LYMPHOCYTES # BLD AUTO: 0.8 K/UL (ref 1–4.8)
LYMPHOCYTES NFR BLD: 8.5 % (ref 18–48)
MCH RBC QN AUTO: 26 PG (ref 27–31)
MCHC RBC AUTO-ENTMCNC: 31.8 G/DL (ref 32–36)
MCV RBC AUTO: 82 FL (ref 82–98)
MICROSCOPIC COMMENT: ABNORMAL
MONOCYTES # BLD AUTO: 1.2 K/UL (ref 0.3–1)
MONOCYTES NFR BLD: 11.8 % (ref 4–15)
MYCOPLASMA PNEUMONIAE: NOT DETECTED
NEUTROPHILS # BLD AUTO: 7.7 K/UL (ref 1.8–7.7)
NEUTROPHILS NFR BLD: 78.6 % (ref 38–73)
NITRITE UR QL STRIP: NEGATIVE
NRBC BLD-RTO: 0 /100 WBC
PH UR STRIP: 6 [PH] (ref 5–8)
PLATELET # BLD AUTO: 148 K/UL (ref 150–450)
PMV BLD AUTO: 11.1 FL (ref 9.2–12.9)
POTASSIUM SERPL-SCNC: 3.6 MMOL/L (ref 3.5–5.1)
PROT SERPL-MCNC: 7.6 G/DL (ref 6–8.4)
PROT UR QL STRIP: NEGATIVE
RBC # BLD AUTO: 4.5 M/UL (ref 4–5.4)
RBC #/AREA URNS AUTO: 2 /HPF (ref 0–4)
RESPIRATORY INFECTION PANEL SOURCE: NORMAL
RSV RNA NPH QL NAA+NON-PROBE: NOT DETECTED
RV+EV RNA NPH QL NAA+NON-PROBE: NOT DETECTED
SARS-COV-2 RNA RESP QL NAA+PROBE: NOT DETECTED
SODIUM SERPL-SCNC: 138 MMOL/L (ref 136–145)
SP GR UR STRIP: 1.01 (ref 1–1.03)
SQUAMOUS #/AREA URNS AUTO: 1 /HPF
TREPONEMA PALLIDUM IGG+IGM AB [PRESENCE] IN SERUM OR PLASMA BY IMMUNOASSAY: REACTIVE
TROPONIN I SERPL DL<=0.01 NG/ML-MCNC: <3 NG/L (ref 0–14)
URN SPEC COLLECT METH UR: ABNORMAL
WBC # BLD AUTO: 9.83 K/UL (ref 3.9–12.7)
WBC #/AREA URNS AUTO: 25 /HPF (ref 0–5)

## 2024-12-31 PROCEDURE — 87086 URINE CULTURE/COLONY COUNT: CPT

## 2024-12-31 PROCEDURE — 86593 SYPHILIS TEST NON-TREP QUANT: CPT

## 2024-12-31 PROCEDURE — 99285 EMERGENCY DEPT VISIT HI MDM: CPT | Mod: 25,HCNC

## 2024-12-31 PROCEDURE — 80053 COMPREHEN METABOLIC PANEL: CPT | Performed by: EMERGENCY MEDICINE

## 2024-12-31 PROCEDURE — 87040 BLOOD CULTURE FOR BACTERIA: CPT | Mod: 59

## 2024-12-31 PROCEDURE — 93005 ELECTROCARDIOGRAM TRACING: CPT

## 2024-12-31 PROCEDURE — 86780 TREPONEMA PALLIDUM: CPT | Mod: HCNC

## 2024-12-31 PROCEDURE — 84484 ASSAY OF TROPONIN QUANT: CPT | Performed by: EMERGENCY MEDICINE

## 2024-12-31 PROCEDURE — 82962 GLUCOSE BLOOD TEST: CPT | Mod: HCNC

## 2024-12-31 PROCEDURE — 86592 SYPHILIS TEST NON-TREP QUAL: CPT

## 2024-12-31 PROCEDURE — 85025 COMPLETE CBC W/AUTO DIFF WBC: CPT | Performed by: EMERGENCY MEDICINE

## 2024-12-31 PROCEDURE — 84484 ASSAY OF TROPONIN QUANT: CPT | Mod: 91

## 2024-12-31 PROCEDURE — 87529 HSV DNA AMP PROBE: CPT | Performed by: EMERGENCY MEDICINE

## 2024-12-31 PROCEDURE — 81001 URINALYSIS AUTO W/SCOPE: CPT

## 2024-12-31 PROCEDURE — 87798 DETECT AGENT NOS DNA AMP: CPT | Performed by: NURSE PRACTITIONER

## 2024-12-31 PROCEDURE — 93010 ELECTROCARDIOGRAM REPORT: CPT | Mod: ,,, | Performed by: INTERNAL MEDICINE

## 2024-12-31 PROCEDURE — 83880 ASSAY OF NATRIURETIC PEPTIDE: CPT | Performed by: EMERGENCY MEDICINE

## 2024-12-31 PROCEDURE — 87389 HIV-1 AG W/HIV-1&-2 AB AG IA: CPT | Performed by: EMERGENCY MEDICINE

## 2024-12-31 RX ORDER — DOXYCYCLINE 100 MG/1
100 CAPSULE ORAL 2 TIMES DAILY
Qty: 28 CAPSULE | Refills: 0 | Status: SHIPPED | OUTPATIENT
Start: 2024-12-31 | End: 2025-01-16

## 2024-12-31 RX ORDER — VALACYCLOVIR HYDROCHLORIDE 1 G/1
1000 TABLET, FILM COATED ORAL EVERY 12 HOURS
Qty: 14 TABLET | Refills: 0 | Status: SHIPPED | OUTPATIENT
Start: 2024-12-31 | End: 2025-01-09

## 2024-12-31 NOTE — ASSESSMENT & PLAN NOTE
- present since Thanksgiving  - Would refer to wound care outpatient due to delayed healing  - Discharged on Doxycycline   - RPR and HSV in process

## 2024-12-31 NOTE — HPI
Kathie Quezada is a 64 yr old AAF s/p LRRT 5/23/24 for ESRD secondary to DM. PMH includes CVA 2017 w/o residual deficits, CHF, factor XII deficiency, history of treated HCV, and pHTN (RHC performed on 6/7/23 and found to have group 2 HTN. TTE and cardiac PET with preserved EF). Post-op course has been uneventful to date. Baseline Cr 1.1-1.4. She presented to the ER with complaints of chest pain and lesion on labia with drainage. Labial lesion present since thanksgiving. Chest pain started overnight associated worsening with deep breaths, nausea, and chills. Pain initially constant but eased off, but she woke up this morning still having pain. She reports pain similar to the past when she had CHF. Labs in the ER stable at baseline. BNP mildly elevated 113. EKG with NSR. Troponin x 2 negative. Infectious work up sent: blood cultures in process , UA negative, CXR without acute findings, and respiratory infection panel non-detected.  She was noted to have a reactive Treponema Pallidum Antibodies (IgG, IgM) RPR, HSV in process - patient discharged on doxycycline for  clitoris ulcer  Will send message to her coordinator for follow up.

## 2024-12-31 NOTE — ED TRIAGE NOTES
Kathie Quezada, a 64 y.o. female presents to the ED w/ complaint of chest pain and vaginal discomfort that started yesterday.     Triage note:  Chief Complaint   Patient presents with    Chest Pain     Hx chf    Kidney Transplant     May 2020    Abscess     Bump on vagina was leaking, chills     Review of patient's allergies indicates:   Allergen Reactions    Atorvastatin Other (See Comments)     Statin induced myalgia    Mushroom Hives    Peanut Anaphylaxis    Bactrim [sulfamethoxazole-trimethoprim] Hives    Gabapentin      Pruritis     Iodine     Peanut butter flavor     Penicillins Hives    Shellfish containing products Itching    Sulfa (sulfonamide antibiotics) Hives     Past Medical History:   Diagnosis Date    Acute respiratory failure with hypoxia and hypercapnia 05/26/2019    ATN (acute tubular necrosis) 02/08/2019    Chronic diastolic heart failure 02/07/2019 2-8-19 Mild left atrial enlargement. Mild left ventricular enlargement. Normal left ventricular systolic function. The estimated ejection fraction is 55% Indeterminate left ventricular diastolic function. Normal right ventricular systolic function. Mild mitral regurgitation. Mild to moderate tricuspid regurgitation. The estimated PA systolic pressure is 32 mm Hg Normal central venous pressure (3 m    CKD (chronic kidney disease) stage 4, GFR 15-29 ml/min 05/16/2018    CKD stage G3a/A3, GFR 45-59 and albumin creatinine ratio >300 mg/g 05/16/2018    CKD stage G3b/A3, GFR 30-44 and albumin creatinine ratio >300 mg/g 05/16/2018    Closed compression fracture of L2 lumbar vertebra 05/24/2019    Closed compression fracture of L2 lumbar vertebra 05/24/2019     IMO Regulatory Load October 2019    Clotted dialysis access 07/22/2023    Controlled type 2 diabetes with retinopathy 04/09/2018    COVID-19 virus infection 7-1-2021 07/01/2021    CVA (cerebral vascular accident) 2017    Diabetic polyneuropathy associated with type 2 diabetes mellitus  12/04/2020    Encounter for blood transfusion     Essential hypertension 04/09/2018    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  7-31-19 Factor XI Activity 55 - 145 % 96       Gastroesophageal reflux disease without esophagitis 05/24/2019    Generalized anxiety disorder 11/08/2019    GERD (gastroesophageal reflux disease)     GIB (gastrointestinal bleeding) 10/18/2018    History of CVA (cerebrovascular accident) 11/27/2022    History of CVA (cerebrovascular accident) without residual deficits 11/27/2022    History of hepatitis C     s/p succesful Rx w/ Harvoni  - SVR12 (cure) 2016    Hypertensive retinopathy, bilateral 12/10/2019    Mild nonproliferative diabetic retinopathy 05/02/2018    Mixed hyperlipidemia 04/09/2018    Myalgia due to statin 09/07/2022    Nephrotic range proteinuria 04/09/2018    Normocytic anemia 01/14/2022    NS (nuclear sclerosis), bilateral 12/10/2019    Peanut allergy 02/04/2023    PNA (pneumonia) 05/28/2019    Postoperative hypothyroidism     Prophylactic immunotherapy 05/24/2024    Pulmonary hypertension 02/06/2023    Renovascular hypertension 04/09/2018    S/P arteriovenous (AV) fistula creation 08/14/2019 8/14/19: L2 kyphoplasty with Dr. Kwon       S/P kidney transplant 05/24/2024    S/P kyphoplasty 08/14/2019 8/14/19: L2 kyphoplasty with Dr. Kwon     Slow transit constipation 05/27/2019    Type 2 diabetes mellitus with circulatory disorder, with long-term current use of insulin 04/09/2018    Type 2 diabetes mellitus with stage 3 chronic kidney disease, without long-term current use of insulin 04/09/2018    Type 2 diabetes mellitus with stage 3a chronic kidney disease, without long-term current use of insulin 03/03/2021    Type 2 diabetes mellitus with stage 3b chronic kidney disease, without long-term current use of insulin 09/07/2022

## 2024-12-31 NOTE — ASSESSMENT & PLAN NOTE
- chest pain x 1 day associated with chills, nausea, and general malaise  - Labs in the ER stable.  - EKG with NSR  - Troponin pending  - Infectious work up sent: blood cultures, RIP, CXR

## 2024-12-31 NOTE — ED PROVIDER NOTES
Encounter Date: 12/31/2024       History     Chief Complaint   Patient presents with    Chest Pain     Hx chf    Kidney Transplant     May 2020    Abscess     Bump on vagina was leaking, chills     64 y.o. female w/ PMH of CHF, DM with a CGM, kidney transplant on quadruple immunosuppresion therapy presents to the ED with chest pain that started last night at rest. Patient states that last night the patient was laying down and started to experience a dull pain in her chest 4/10 along with nausea, chills, and rigors. Patient states that these symptoms are unchanged from last night however the chest pain is now a 2/10 that is worsened with deep breaths. Patient also is complaining of a labial abrasion at the 12o clock position that is worsened with urination and sometimes spontaneously expresses pus. Patient denies any fever, vomiting, or recent illness.     Patient states that the around thanksgiving, the patient experienced a labial cyst around the clitoris that spontaneously ruptured with pain. Ever since then, she has had pain with urination. Patient denies any other cysts.         Review of patient's allergies indicates:   Allergen Reactions    Atorvastatin Other (See Comments)     Statin induced myalgia    Mushroom Hives    Peanut Anaphylaxis    Bactrim [sulfamethoxazole-trimethoprim] Hives    Gabapentin      Pruritis     Iodine     Peanut butter flavor     Penicillins Hives    Shellfish containing products Itching    Sulfa (sulfonamide antibiotics) Hives     Past Medical History:   Diagnosis Date    Acute respiratory failure with hypoxia and hypercapnia 05/26/2019    ATN (acute tubular necrosis) 02/08/2019    Chronic diastolic heart failure 02/07/2019    2-8-19 Mild left atrial enlargement. Mild left ventricular enlargement. Normal left ventricular systolic function. The estimated ejection fraction is 55% Indeterminate left ventricular diastolic function. Normal right ventricular systolic function. Mild mitral  regurgitation. Mild to moderate tricuspid regurgitation. The estimated PA systolic pressure is 32 mm Hg Normal central venous pressure (3 m    CKD (chronic kidney disease) stage 4, GFR 15-29 ml/min 05/16/2018    CKD stage G3a/A3, GFR 45-59 and albumin creatinine ratio >300 mg/g 05/16/2018    CKD stage G3b/A3, GFR 30-44 and albumin creatinine ratio >300 mg/g 05/16/2018    Closed compression fracture of L2 lumbar vertebra 05/24/2019    Closed compression fracture of L2 lumbar vertebra 05/24/2019     IMO Regulatory Load October 2019    Clotted dialysis access 07/22/2023    Controlled type 2 diabetes with retinopathy 04/09/2018    COVID-19 virus infection 7-1-2021 07/01/2021    CVA (cerebral vascular accident) 2017    Diabetic polyneuropathy associated with type 2 diabetes mellitus 12/04/2020    Encounter for blood transfusion     Essential hypertension 04/09/2018    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  7-31-19 Factor XI Activity 55 - 145 % 96       Gastroesophageal reflux disease without esophagitis 05/24/2019    Generalized anxiety disorder 11/08/2019    GERD (gastroesophageal reflux disease)     GIB (gastrointestinal bleeding) 10/18/2018    History of CVA (cerebrovascular accident) 11/27/2022    History of CVA (cerebrovascular accident) without residual deficits 11/27/2022    History of hepatitis C     s/p succesful Rx w/ [facility]  - SVR12 (cure) 2016    Hypertensive retinopathy, bilateral 12/10/2019    Mild nonproliferative diabetic retinopathy 05/02/2018    Mixed hyperlipidemia 04/09/2018    Myalgia due to statin 09/07/2022    Nephrotic range proteinuria 04/09/2018    Normocytic anemia 01/14/2022    NS (nuclear sclerosis), bilateral 12/10/2019    Peanut allergy 02/04/2023    PNA (pneumonia) 05/28/2019    Postoperative hypothyroidism     Prophylactic immunotherapy 05/24/2024    Pulmonary hypertension 02/06/2023    Renovascular hypertension 04/09/2018    S/P arteriovenous (AV) fistula  creation 08/14/2019 8/14/19: L2 kyphoplasty with Dr. Kwon       S/P kidney transplant 05/24/2024    S/P kyphoplasty 08/14/2019 8/14/19: L2 kyphoplasty with Dr. Kwon     Slow transit constipation 05/27/2019    Type 2 diabetes mellitus with circulatory disorder, with long-term current use of insulin 04/09/2018    Type 2 diabetes mellitus with stage 3 chronic kidney disease, without long-term current use of insulin 04/09/2018    Type 2 diabetes mellitus with stage 3a chronic kidney disease, without long-term current use of insulin 03/03/2021    Type 2 diabetes mellitus with stage 3b chronic kidney disease, without long-term current use of insulin 09/07/2022     Past Surgical History:   Procedure Laterality Date    AV FISTULA PLACEMENT Left 10/27/2022    Procedure: CREATION, AV FISTULA;  Surgeon: Tirso Tavera MD;  [facility];  Service: Peripheral Vascular;  Laterality: Left;  Brachio basilic 1st stage     BACK SURGERY  2017    CHOLECYSTECTOMY  2015    [facility]    COLONOSCOPY  11/13/2015    Dr Chino torres prep. internal hemorrhoids. diverticulosis of sigmois. NO polyps. repeat due 2025    FISTULOGRAM N/A 12/16/2022    Procedure: Fistulogram;  Surgeon: Tirso Tavera MD;  Location: [facility] CATH LAB;  Service: Peripheral Vascular;  Laterality: N/A;    FISTULOGRAM, WITH PTA Left 3/23/2023    Procedure: FISTULOGRAM, WITH PTA;  Surgeon: Tirso Tavera MD;  [facility];  Service: Peripheral Vascular;  Laterality: Left;  LUE TRANSRADIAL CO2 FISTULOGRAM    1.9 min  22.99 mGy  4.3032 Gy.cm  200cc CO2  9ml TransRadial Solution    FISTULOGRAM, WITH PTA Left 8/15/2023    Procedure: FISTULOGRAM, WITH PTA TRANSRADIAL;  Surgeon: Tirso Tavera MD;  Location: [facility] OR [facility];  Service: Peripheral Vascular;  Laterality: Left;  Fluoro: 5.1 min, mGy: 7.95, Gycm2: 1.2626, contrast: 12ml    FIXATION KYPHOPLASTY Bilateral 08/14/2019    Procedure: L2 kyphoplasty Fluoro Globus;  Surgeon: Jeremie ANGEL  DO Doyle;  Location: [facility];  Service: Neurosurgery;  Laterality: Bilateral;  CROW [place] [phone] TEXTED HER @ 10:54AM ON 7-30-19  PRE-OP-BY RN  8-7-2019    HYSTERECTOMY      KIDNEY TRANSPLANT N/A 5/23/2024    Procedure: TRANSPLANT, KIDNEY;  Surgeon: Stepan Bernardo MD;  Location: [address];  Service: Transplant;  Laterality: N/A;    OOPHORECTOMY  1996    one    PERCUTANEOUS TRANSLUMINAL ANGIOPLASTY OF ARTERIOVENOUS FISTULA Left 12/16/2022    Procedure: PTA, AV FISTULA;  Surgeon: Tirso Tavera MD;  Location: [facility] CATH LAB;  Service: Peripheral Vascular;  Laterality: Left;    PERCUTANEOUS TRANSLUMINAL ANGIOPLASTY OF ARTERIOVENOUS FISTULA Left 11/30/2023    Procedure: PTA, AV FISTULA;  Surgeon: Tirso Tavera MD;  Location: [facility] CATH LAB;  Service: Peripheral Vascular;  Laterality: Left;  left transradial fistulagram    RIGHT HEART CATHETERIZATION Right 6/15/2023    Procedure: INSERTION, CATHETER, RIGHT HEART;  Surgeon: Lucretai Rivas MD;  Location: [facility] CATH LAB;  Service: Cardiology;  Laterality: Right;    THYROIDECTOMY  2016    at [facility] for thyroid nodule    TONSILLECTOMY      TRANSPOSITION OF BASILIC VEIN Left 1/31/2023    Procedure: TRANSPOSITION, VEIN, BASILIC;  Surgeon: Tirso Tavera MD;  Location: [address];  Service: Peripheral Vascular;  Laterality: Left;     Family History   Problem Relation Name Age of Onset    Stroke Mother      Heart disease Mother      Hypertension Mother      Diabetes type II Mother      Coronary artery disease Mother      Hypertension Father      Prostate cancer Father      Aneurysm Brother       Social History     Tobacco Use    Smoking status: Never     Passive exposure: Past    Smokeless tobacco: Never   Substance Use Topics    Alcohol use: Not Currently    Drug use: No     Review of Systems   Constitutional:  Positive for chills and diaphoresis. Negative for activity change, appetite change, fatigue and fever.   HENT: Negative.     Eyes: Negative.     Respiratory:  Positive for chest tightness. Negative for cough, choking, shortness of breath, wheezing and stridor.    Cardiovascular:  Positive for chest pain. Negative for palpitations and leg swelling.   Gastrointestinal:  Negative for abdominal pain, constipation and diarrhea.   Endocrine: Negative.    Musculoskeletal: Negative.    Skin: Negative.    Allergic/Immunologic: Negative.    Neurological: Negative.    Hematological: Negative.    All other systems reviewed and are negative.      Physical Exam     Initial Vitals [12/31/24 1509]   BP Pulse Resp Temp SpO2   (!) 117/56 67 18 98.4 °F (36.9 °C) 97 %      MAP       --         Physical Exam    Nursing note and vitals reviewed.  Constitutional: She is diaphoretic. No distress.   HENT:   Head: Normocephalic and atraumatic.   Eyes: EOM are normal. Pupils are equal, round, and reactive to light.   Neck: Neck supple.   Normal range of motion.  Cardiovascular:  Normal rate, regular rhythm, normal heart sounds and intact distal pulses.           Pulmonary/Chest: Breath sounds normal. She exhibits tenderness.     Abdominal: Abdomen is soft.   Genitourinary:    Genitourinary Comments: Erythema and abrasion over the clitoris         Musculoskeletal:         General: Normal range of motion.      Cervical back: Normal range of motion and neck supple.     Neurological: She is alert and oriented to person, place, and time.   Skin: Skin is warm. Capillary refill takes less than 2 seconds.         ED Course   Procedures  Labs Reviewed   CBC W/ AUTO DIFFERENTIAL - Abnormal       Result Value    WBC 9.83      RBC 4.50      Hemoglobin 11.7 (*)     Hematocrit 36.8 (*)     MCV 82      MCH 26.0 (*)     MCHC 31.8 (*)     RDW 13.3      Platelets 148 (*)     MPV 11.1      Immature Granulocytes 0.4      Gran # (ANC) 7.7      Immature Grans (Abs) 0.04      Lymph # 0.8 (*)     Mono # 1.2 (*)     Eos # 0.0      Baso # 0.04      nRBC 0      Gran % 78.6 (*)     Lymph % 8.5 (*)     Mono %  11.8      Eosinophil % 0.3      Basophil % 0.4      Differential Method Automated     COMPREHENSIVE METABOLIC PANEL - Abnormal    Sodium 138      Potassium 3.6      Chloride 105      CO2 23      Glucose 146 (*)     BUN 10      Creatinine 1.1      Calcium 9.0      Total Protein 7.6      Albumin 3.7      Total Bilirubin 0.7      Alkaline Phosphatase 95      AST 10      ALT 9 (*)     eGFR 56.8 (*)     Anion Gap 10     B-TYPE NATRIURETIC PEPTIDE - Abnormal     (*)    URINALYSIS, REFLEX TO URINE CULTURE - Abnormal    Specimen UA Urine, Clean Catch      Color, UA Yellow      Appearance, UA Clear      pH, UA 6.0      Specific Gravity, UA 1.010      Protein, UA Negative      Glucose, UA Negative      Ketones, UA Negative      Bilirubin (UA) Negative      Occult Blood UA Negative      Nitrite, UA Negative      Leukocytes, UA 3+ (*)     Narrative:     Specimen Source->Urine   TREPONEMA PALLIDIUM ANTIBODIES IGG, IGM - Abnormal    Treponema Pallidum Antibodies (IgG, IgM) Reactive (*)    URINALYSIS MICROSCOPIC - Abnormal    RBC, UA 2      WBC, UA 25 (*)     Bacteria Rare      Squam Epithel, UA 1      Microscopic Comment SEE COMMENT      Narrative:     Specimen Source->Urine   CULTURE, BLOOD    Blood Culture, Routine No Growth to date      Blood Culture, Routine No Growth to date      Narrative:     Draw sample # 2 from separate site   CULTURE, BLOOD    Blood Culture, Routine No Growth to date      Blood Culture, Routine No Growth to date      Narrative:     Draw sample # 2 from separate site   RESPIRATORY INFECTION PANEL (PCR), NASOPHARYNGEAL    Respiratory Infection Panel Source NP Swab      Adenovirus Not Detected      Coronavirus 229E, Common Cold Virus Not Detected      Coronavirus HKU1, Common Cold Virus Not Detected      Coronavirus NL63, Common Cold Virus Not Detected      Coronavirus OC43, Common Cold Virus Not Detected      SARS-CoV2 (COVID-19) Qualitative PCR Not Detected      Human Metapneumovirus Not Detected       Human Rhinovirus/Enterovirus Not Detected      Influenza A (subtypes H1, H1-2009,H3) Not Detected      Influenza B Not Detected      Parainfluenza Virus 1 Not Detected      Parainfluenza Virus 2 Not Detected      Parainfluenza Virus 3 Not Detected      Parainfluenza Virus 4 Not Detected      Respiratory Syncytial Virus Not Detected      Bordetella Parapertussis ([id number]) Not Detected      Bordetella pertussis (ptxP) Not Detected      Chlamydia pneumoniae Not Detected      Mycoplasma pneumoniae Not Detected      Narrative:     Assay not valid for lower respiratory specimens, alternate  testing required.   CULTURE, URINE    Urine Culture, Routine        Value: Multiple organisms isolated. None in predominance.  Repeat if    Urine Culture, Routine clinically necessary.      Narrative:     Specimen Source->Urine   TROPONIN I HIGH SENSITIVITY    Troponin I High Sensitivity <3     TROPONIN I HIGH SENSITIVITY    Troponin I High Sensitivity <3     HIV 1 / 2 ANTIBODY    HIV 1/2 Ag/Ab Non-reactive      Narrative:     Release to patient->Immediate   TROPONIN I HIGH SENSITIVITY    Troponin I High Sensitivity <3      Narrative:     Release to patient->Immediate   RPR   HERPES SIMPLEX VIRUS (HSV) TYPE 1 & 2 DNA BY PCR        ECG Results              EKG 12-lead (Final result)        Collection Time Result Time QRS Duration OHS QTC Calculation    12/31/24 16:54:04 01/01/25 12:24:13 76 439                     Final result by Interface, Lab In [facility] (01/01/25 12:24:20)                   Narrative:    Test Reason :    Vent. Rate :  97 BPM     Atrial Rate :  97 BPM     P-R Int : 154 ms          QRS Dur :  76 ms      QT Int : 346 ms       P-R-T Axes :  18  40  36 degrees    QTcB Int : 439 ms    Normal sinus rhythm with artifact  Normal ECG  When compared with ECG of 31-Dec-2024 15:14,  Premature ventricular complexes are no longer Present  Confirmed by Mark Okeefe ([id number]) on 1/1/2025 12:24:07 PM    Referred By: AAAREFERRAL SELF            Confirmed By: Mark Okeefe                                     EKG 12-lead (Final result)        Collection Time Result Time QRS Duration OHS QTC Calculation    12/31/24 15:14:11 01/01/25 09:31:00 78 437                     Final result by Interface, Lab In [facility] (01/01/25 09:31:06)                   Narrative:    Test Reason : I20.89,    Vent. Rate : 103 BPM     Atrial Rate : 103 BPM     P-R Int : 166 ms          QRS Dur :  78 ms      QT Int : 334 ms       P-R-T Axes :   0  15  29 degrees    QTcB Int : 437 ms    Sinus tachycardia with occasional Premature ventricular complexes  Early repolarization  Abnormal R wave progression  Cannot rule out septal infarct  Abnormal ECG  When compared with ECG of 02-May-2024 12:33,  Premature ventricular complexes are now Present  No significant change was found  Repeat ECG with appropriate lead placement if clinically indicated  Confirmed by Solis Mary ([id number]) on 1/1/2025 9:30:59 AM    Referred By: AAAREFERRAL SELF           Confirmed By: Solis Mary                                  Imaging Results              X-Ray Chest PA And Lateral (Final result)  Result time 12/31/24 19:37:47      Final result by Nicholas Yadav MD (12/31/24 19:37:47)                   Impression:      1.  No acute intrathoracic process.    2.  Interval removal of the previous right-sided central access catheter.      Electronically signed by: Nicholas Yadav MD  Date:    12/31/2024  Time:    19:37               Narrative:    EXAMINATION:  XR CHEST PA AND LATERAL    CLINICAL HISTORY:  s/p kidney transplant. To ER with chest pain. Assess for infection.;    TECHNIQUE:  PA and lateral views of the chest were performed.    COMPARISON:  05/02/2024.    FINDINGS:  There are postoperative changes in the base of the neck.  There has been interval removal of the previous right-sided central access catheter.  Monitoring EKG leads are present.  There are vertebral augmentation changes in the thoracolumbar  spine.    The trachea is unremarkable.  The cardiomediastinal silhouette is within normal limits.  There is no evidence of free air beneath the hemidiaphragms.  There are no pleural effusions.  There is no evidence of a pneumothorax.  There is no evidence of pneumomediastinum.  No airspace opacity is present.  There are degenerative changes in the osseous structures.                                       Medications - No data to display  Medical Decision Making  64-year-old  female with a medical history significant for end-stage renal disease (ESRD) secondary to diabetes mellitus, status post left renal transplant (LRRT) on May 23, 2024. Other past medical history includes a cerebrovascular accident (CVA) in 2017 without residual deficits, congestive heart failure (CHF), factor XII deficiency, a history of treated hepatitis C virus (HCV), and pulmonary hypertension (pHTN) diagnosed via right heart catheterization (RHC) on June 7, 2023, showing group 2 hypertension. Her transthoracic echocardiogram (TTE) and cardiac PET scan demonstrated preserved ejection fraction (EF). Post-operatively, her course has been uneventful, and her baseline creatinine levels range between 1.1-1.4 mg/dL.      On examination and laboratory work-up, the patient's labs were stable and consistent with her baseline, with a mildly elevated BNP level of 113. The electrocardiogram (EKG) showed normal sinus rhythm (NSR), and troponin levels were negative on two separate tests. Infectious work-up was initiated, with blood cultures pending, a negative urinalysis, a chest X-ray showing no acute findings, and a respiratory infection panel that was non-detectable.     Treponema pallidum antibody testing was reactive, and the rapid plasma reagin (RPR) was positive, suggesting a possible diagnosis of syphilis, and herpes simplex virus (HSV) testing was pending.    Will refer to wound care outpatient due to delayed healing  Discharged on  Doxycycline  Blood cultures and HSV results will be monitored, and if there is any clinical deterioration or new symptoms, she is instructed to return to the emergency room    Amount and/or Complexity of Data Reviewed  Labs: ordered. Decision-making details documented in ED Course.    Risk  Prescription drug management.              Attending Attestation:   Physician Attestation Statement for Resident:  As the supervising MD   Physician Attestation Statement: I have personally seen and examined this patient.   I agree with the above history.  -: 64-year-old female with a renal transplant in 2020, complex past medical history including CHF, diabetes, CVA, presenting with a vaginal lesion.  Patient is inconsistent about the timeline of this.  She reports that she noticed it around Thanksgiving but then later says something that suggest that it may have been present for years.  The lesion is painful.  On exam, she has a large ulcerative lesion with some mild swelling over the clitoris.  There was no abscess and on a internal pelvic exam, I see no other lesions.  Patient has had a hysterectomy.  Differential would include syphilis, herpetic lesion, malignancy, versus some other autoimmune type process.  Patient denies any sexual activity for the past 2 years.  I did send syphilis screening labs which are positive.  Confirmatory testing is still pending.  I will empirically treat her for both syphilis and herpes with Valtrex and doxycycline.  Patient is unfortunately penicillin allergic.  Renal transplant team is aware of this issue.  They will ensure that patient gets close follow up with transplant ID and I will also make a referral to OBGYN.  In terms of patient's chest pain, it is atypical, her EKGs unremarkable and 1st high sensitivity troponin negative.  If 2nd troponin negative, plan for outpatient Cardiology evaluation for this.   As the supervising MD I agree with the above PE.     As the supervising MD I agree  with the above treatment, course, plan, and disposition.                    ED Course as of 01/02/25 0718   Tue Dec 31, 2024   1737 WBC: 9.83 [AS]      ED Course User Index  [AS] Kamala Parker MD                           Clinical Impression:  Final diagnoses:  [I20.89] Anginal chest pain at rest  [I50.30] Diastolic CHF  [N76.6] Genital ulcer, female (Primary)          ED Disposition Condition    Discharge Stable          ED Prescriptions       Medication Sig Dispense Start Date End Date Auth. Provider    doxycycline (VIBRAMYCIN) 100 MG Cap Take 1 capsule (100 mg total) by mouth 2 (two) times daily. for 14 days 28 capsule 12/31/2024 1/14/2025 Kamala Parker MD    valACYclovir (VALTREX) 1000 MG tablet Take 1 tablet (1,000 mg total) by mouth every 12 (twelve) hours. for 7 days 14 tablet 12/31/2024 1/7/2025 Kamala Parker MD          Follow-up Information       Follow up With Specialties Details Why Contact Info    [facility] - Emergency Dept Emergency Medicine  If symptoms worsen [address] [facility] [id number] [phone]    Elan Price MD Internal Medicine, Hospitalist Call in 1 day  [address]  [place] [address]  [phone]               Akbar Franco MD  Resident  01/02/25 0718

## 2024-12-31 NOTE — SUBJECTIVE & OBJECTIVE
Subjective:     Chief Complaint/Reason for Admission: chest pain    History of Present Illness:  Kathie Quezada is a 64 yr old AAF s/p LRRT 5/23/24 for ESRD secondary to DM. PMH includes CVA 2017 w/o residual deficits, CHF, factor XII deficiency, history of treated HCV, and pHTN (RHC performed on 6/7/23 and found to have group 2 HTN. TTE and cardiac PET with preserved EF). Post-op course has been uneventful to date. Baseline Cr 1.1-1.4. She presented to the ER with complaints of chest pain and lesion on labia with drainage. Labial lesion present since thanksgiving. Chest pain started overnight associated worsening with deep breaths, nausea, and chills. Pain initially constant but eased off, but she woke up this morning still having pain. She reports pain similar to the past when she had CHF. Labs in the ER stable at baseline. BNP mildly elevated 113. EKG with NSR. Troponin x 2 negative. Infectious work up sent: blood cultures in process , UA negative, CXR without acute findings, and respiratory infection panel non-detected.  She was noted to have a reactive Treponema Pallidum Antibodies (IgG, IgM) RPR, HSV in process - patient discharged on doxycycline for  clitoris ulcer  Will send message to her coordinator for follow up.         (Not in a hospital admission)      Review of patient's allergies indicates:   Allergen Reactions    Atorvastatin Other (See Comments)     Statin induced myalgia    Mushroom Hives    Peanut Anaphylaxis    Bactrim [sulfamethoxazole-trimethoprim] Hives    Gabapentin      Pruritis     Iodine     Peanut butter flavor     Penicillins Hives    Shellfish containing products Itching    Sulfa (sulfonamide antibiotics) Hives       Past Medical History:   Diagnosis Date    Acute respiratory failure with hypoxia and hypercapnia 05/26/2019    ATN (acute tubular necrosis) 02/08/2019    Chronic diastolic heart failure 02/07/2019    2-8-19 Mild left atrial enlargement. Mild left ventricular  enlargement. Normal left ventricular systolic function. The estimated ejection fraction is 55% Indeterminate left ventricular diastolic function. Normal right ventricular systolic function. Mild mitral regurgitation. Mild to moderate tricuspid regurgitation. The estimated PA systolic pressure is 32 mm Hg Normal central venous pressure (3 m    CKD (chronic kidney disease) stage 4, GFR 15-29 ml/min 05/16/2018    CKD stage G3a/A3, GFR 45-59 and albumin creatinine ratio >300 mg/g 05/16/2018    CKD stage G3b/A3, GFR 30-44 and albumin creatinine ratio >300 mg/g 05/16/2018    Closed compression fracture of L2 lumbar vertebra 05/24/2019    Closed compression fracture of L2 lumbar vertebra 05/24/2019     IMO Regulatory Load October 2019    Clotted dialysis access 07/22/2023    Controlled type 2 diabetes with retinopathy 04/09/2018    COVID-19 virus infection 7-1-2021 07/01/2021    CVA (cerebral vascular accident) 2017    Diabetic polyneuropathy associated with type 2 diabetes mellitus 12/04/2020    Encounter for blood transfusion     Essential hypertension 04/09/2018    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  7-31-19 Factor XI Activity 55 - 145 % 96       Gastroesophageal reflux disease without esophagitis 05/24/2019    Generalized anxiety disorder 11/08/2019    GERD (gastroesophageal reflux disease)     GIB (gastrointestinal bleeding) 10/18/2018    History of CVA (cerebrovascular accident) 11/27/2022    History of CVA (cerebrovascular accident) without residual deficits 11/27/2022    History of hepatitis C     s/p succesful Rx w/ [facility]  - SVR12 (cure) 2016    Hypertensive retinopathy, bilateral 12/10/2019    Mild nonproliferative diabetic retinopathy 05/02/2018    Mixed hyperlipidemia 04/09/2018    Myalgia due to statin 09/07/2022    Nephrotic range proteinuria 04/09/2018    Normocytic anemia 01/14/2022    NS (nuclear sclerosis), bilateral 12/10/2019    Peanut allergy 02/04/2023    PNA (pneumonia)  05/28/2019    Postoperative hypothyroidism     Prophylactic immunotherapy 05/24/2024    Pulmonary hypertension 02/06/2023    Renovascular hypertension 04/09/2018    S/P arteriovenous (AV) fistula creation 08/14/2019 8/14/19: L2 kyphoplasty with Dr. Kwon       S/P kidney transplant 05/24/2024    S/P kyphoplasty 08/14/2019 8/14/19: L2 kyphoplasty with Dr. Kwon     Slow transit constipation 05/27/2019    Type 2 diabetes mellitus with circulatory disorder, with long-term current use of insulin 04/09/2018    Type 2 diabetes mellitus with stage 3 chronic kidney disease, without long-term current use of insulin 04/09/2018    Type 2 diabetes mellitus with stage 3a chronic kidney disease, without long-term current use of insulin 03/03/2021    Type 2 diabetes mellitus with stage 3b chronic kidney disease, without long-term current use of insulin 09/07/2022     Past Surgical History:   Procedure Laterality Date    AV FISTULA PLACEMENT Left 10/27/2022    Procedure: CREATION, AV FISTULA;  Surgeon: Tirso Tavera MD;  Location: [facility] OR [address] [place] [address];  Service: Peripheral Vascular;  Laterality: Left;  Brachio basilic 1st stage     BACK SURGERY  2017    CHOLECYSTECTOMY  2015    [facility]    COLONOSCOPY  11/13/2015    Dr Chino torres prep. internal hemorrhoids. diverticulosis of sigmois. NO polyps. repeat due 2025    FISTULOGRAM N/A 12/16/2022    Procedure: Fistulogram;  Surgeon: Tirso Tavera MD;  Location: [facility] CATH LAB;  Service: Peripheral Vascular;  Laterality: N/A;    FISTULOGRAM, WITH PTA Left 3/23/2023    Procedure: FISTULOGRAM, WITH PTA;  Surgeon: Tirso Tavera MD;  [facility];  Service: Peripheral Vascular;  Laterality: Left;  LUE TRANSRADIAL CO2 FISTULOGRAM    1.9 min  22.99 mGy  4.3032 Gy.cm  200cc CO2  9ml TransRadial Solution    FISTULOGRAM, WITH PTA Left 8/15/2023    Procedure: FISTULOGRAM, WITH PTA TRANSRADIAL;  Surgeon: Tirso Tavera MD;  [facility];  Service: Peripheral  Vascular;  Laterality: Left;  Fluoro: 5.1 min, mGy: 7.95, Gycm2: 1.2626, contrast: 12ml    FIXATION KYPHOPLASTY Bilateral 08/14/2019    Procedure: L2 kyphoplasty Fluoro Globus;  Surgeon: Jeremie Kwon DO;  Location: [facility];  Service: Neurosurgery;  Laterality: Bilateral;  GLOBUS MACRINA WINSLOW [phone] TEXTED HER @ 10:54AM ON 7-30-19  PRE-OP-BY RN  8-7-2019    HYSTERECTOMY      KIDNEY TRANSPLANT N/A 5/23/2024    Procedure: TRANSPLANT, KIDNEY;  Surgeon: Stepan Bernardo MD;  Location: [address];  Service: Transplant;  Laterality: N/A;    OOPHORECTOMY  1996    one    PERCUTANEOUS TRANSLUMINAL ANGIOPLASTY OF ARTERIOVENOUS FISTULA Left 12/16/2022    Procedure: PTA, AV FISTULA;  Surgeon: Tirso Tavera MD;  Location: [facility] CATH LAB;  Service: Peripheral Vascular;  Laterality: Left;    PERCUTANEOUS TRANSLUMINAL ANGIOPLASTY OF ARTERIOVENOUS FISTULA Left 11/30/2023    Procedure: PTA, AV FISTULA;  Surgeon: Tirso Tavera MD;  Location: [facility] CATH LAB;  Service: Peripheral Vascular;  Laterality: Left;  left transradial fistulagram    RIGHT HEART CATHETERIZATION Right 6/15/2023    Procedure: INSERTION, CATHETER, RIGHT HEART;  Surgeon: Lucretia Rivas MD;  Location: [facility] CATH LAB;  Service: Cardiology;  Laterality: Right;    THYROIDECTOMY  2016    at [facility] for thyroid nodule    TONSILLECTOMY      TRANSPOSITION OF BASILIC VEIN Left 1/31/2023    Procedure: TRANSPOSITION, VEIN, BASILIC;  Surgeon: Tirso Tavera MD;  [address];  Service: Peripheral Vascular;  Laterality: Left;     Family History       Problem Relation (Age of Onset)    Aneurysm Brother    Coronary artery disease Mother    Diabetes type II Mother    Heart disease Mother    Hypertension Mother, Father    Prostate cancer Father    Stroke Mother          Tobacco Use    Smoking status: Never     Passive exposure: Past    Smokeless tobacco: Never   Substance and Sexual Activity    Alcohol use: Not Currently    Drug use: No    Sexual activity:  "Not Currently        Review of Systems   Constitutional:  Positive for appetite change. Negative for chills, fatigue and fever.   Respiratory:  Negative for cough, chest tightness and shortness of breath.    Cardiovascular:  Positive for chest pain. Negative for palpitations and leg swelling.   Gastrointestinal:  Negative for abdominal distention, abdominal pain, constipation, diarrhea, nausea and vomiting.   Genitourinary:  Negative for difficulty urinating, dysuria, frequency and urgency.   Musculoskeletal:  Negative for back pain and neck pain.   Skin:  Positive for wound. Negative for color change, pallor and rash.        See media    Allergic/Immunologic: Positive for immunocompromised state.   Neurological:  Negative for dizziness, weakness and headaches.   Psychiatric/Behavioral:  Negative for confusion and sleep disturbance.      Objective:     Vital Signs (Most Recent):  Temp: 98.4 °F (36.9 °C) (12/31/24 1509)  Pulse: 83 (12/31/24 2030)  Resp: 19 (12/31/24 2030)  BP: 132/78 (12/31/24 2030)  SpO2: 98 % (12/31/24 2030)  Height: 5' 9" (175.3 cm)  Weight: 103.4 kg (228 lb)  Body mass index is 33.67 kg/m².      Physical Exam  Vitals and nursing note reviewed.   Constitutional:       General: She is awake.      Appearance: Normal appearance.   Cardiovascular:      Rate and Rhythm: Normal rate and regular rhythm.      Pulses: Normal pulses.   Pulmonary:      Effort: Pulmonary effort is normal. No respiratory distress.      Breath sounds: Normal breath sounds. No decreased breath sounds, wheezing or rales.   Abdominal:      General: Bowel sounds are normal. There is no distension.      Palpations: Abdomen is soft.      Tenderness: There is no abdominal tenderness. There is no guarding.   Genitourinary:     Comments: Erythematous skin excoriation/maceration noted to labia. No overt signs of infection.   Musculoskeletal:         General: Normal range of motion.   Skin:     General: Skin is warm and dry. "   Neurological:      Mental Status: She is alert and oriented to person, place, and time.   Psychiatric:         Behavior: Behavior is cooperative.          Laboratory  CBC:   Recent Labs   Lab 12/31/24  1631   WBC 9.83   RBC 4.50   HGB 11.7*   HCT 36.8*   *   MCV 82   MCH 26.0*   MCHC 31.8*     CMP:   Recent Labs   Lab 12/31/24  1631   *   CALCIUM 9.0   ALBUMIN 3.7   PROT 7.6      K 3.6   CO2 23      BUN 10   CREATININE 1.1   ALKPHOS 95   ALT 9*   AST 10      See cardiac markers in results   Labs within the past 24 hours have been reviewed.    Diagnostic Results:  US - Kidney: No results found for this or any previous visit.

## 2024-12-31 NOTE — ASSESSMENT & PLAN NOTE
- Continue prograf, cellcept, and prednisone  - Check prograf level daily and adjust for therapeutic dosage. Monitor for toxic side effects

## 2025-01-01 VITALS
WEIGHT: 228 LBS | RESPIRATION RATE: 20 BRPM | TEMPERATURE: 99 F | SYSTOLIC BLOOD PRESSURE: 107 MMHG | BODY MASS INDEX: 33.77 KG/M2 | DIASTOLIC BLOOD PRESSURE: 65 MMHG | OXYGEN SATURATION: 97 % | HEIGHT: 69 IN | HEART RATE: 87 BPM

## 2025-01-01 LAB
OHS QRS DURATION: 76 MS
OHS QRS DURATION: 78 MS
OHS QTC CALCULATION: 437 MS
OHS QTC CALCULATION: 439 MS

## 2025-01-01 NOTE — ED NOTES
Assumed care of pt.     Pt AAOx4, resting comfortably in bed, NAD, respirations E/UL, updated on POC, wheels locked and in low position, call bell with in reach, Comfort positioning and restroom needs were addressed. Necessary items were placed with in reach and was advised when a reassessment would take place.     Pt denies any new complaints at this time. Pt states her chest pain has since resolved, however her vaginal discomfort is still there.

## 2025-01-01 NOTE — CONSULTS
[facility] - Emergency Dept  Kidney Transplant  Consult Note    Consults    Subjective:     Chief Complaint/Reason for Admission: chest pain    History of Present Illness:  Kathie Quezada is a 64 yr old AAF s/p LRRT 5/23/24 for ESRD secondary to DM. PMH includes CVA 2017 w/o residual deficits, CHF, factor XII deficiency, history of treated HCV, and pHTN (RHC performed on 6/7/23 and found to have group 2 HTN. TTE and cardiac PET with preserved EF). Post-op course has been uneventful to date. Baseline Cr 1.1-1.4. She presented to the ER with complaints of chest pain and lesion on labia with drainage. Labial lesion present since thanksgiving. Chest pain started overnight associated worsening with deep breaths, nausea, and chills. Pain initially constant but eased off, but she woke up this morning still having pain. She reports pain similar to the past when she had CHF. Labs in the ER stable at baseline. BNP mildly elevated 113. EKG with NSR. Troponin x 2 negative. Infectious work up sent: blood cultures in process , UA negative, CXR without acute findings, and respiratory infection panel non-detected.  She was noted to have a reactive Treponema Pallidum Antibodies (IgG, IgM) RPR, HSV in process - patient discharged on doxycycline for  clitoris ulcer  Will send message to her coordinator for follow up.         (Not in a hospital admission)      Review of patient's allergies indicates:   Allergen Reactions    Atorvastatin Other (See Comments)     Statin induced myalgia    Mushroom Hives    Peanut Anaphylaxis    Bactrim [sulfamethoxazole-trimethoprim] Hives    Gabapentin      Pruritis     Iodine     Peanut butter flavor     Penicillins Hives    Shellfish containing products Itching    Sulfa (sulfonamide antibiotics) Hives       Past Medical History:   Diagnosis Date    Acute respiratory failure with hypoxia and hypercapnia 05/26/2019    ATN (acute tubular necrosis) 02/08/2019    Chronic diastolic heart failure 02/07/2019     2-8-19 Mild left atrial enlargement. Mild left ventricular enlargement. Normal left ventricular systolic function. The estimated ejection fraction is 55% Indeterminate left ventricular diastolic function. Normal right ventricular systolic function. Mild mitral regurgitation. Mild to moderate tricuspid regurgitation. The estimated PA systolic pressure is 32 mm Hg Normal central venous pressure (3 m    CKD (chronic kidney disease) stage 4, GFR 15-29 ml/min 05/16/2018    CKD stage G3a/A3, GFR 45-59 and albumin creatinine ratio >300 mg/g 05/16/2018    CKD stage G3b/A3, GFR 30-44 and albumin creatinine ratio >300 mg/g 05/16/2018    Closed compression fracture of L2 lumbar vertebra 05/24/2019    Closed compression fracture of L2 lumbar vertebra 05/24/2019     IMO Regulatory Load October 2019    Clotted dialysis access 07/22/2023    Controlled type 2 diabetes with retinopathy 04/09/2018    COVID-19 virus infection 7-1-2021 07/01/2021    CVA (cerebral vascular accident) 2017    Diabetic polyneuropathy associated with type 2 diabetes mellitus 12/04/2020    Encounter for blood transfusion     Essential hypertension 04/09/2018    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  7-31-19 Factor XI Activity 55 - 145 % 96       Gastroesophageal reflux disease without esophagitis 05/24/2019    Generalized anxiety disorder 11/08/2019    GERD (gastroesophageal reflux disease)     GIB (gastrointestinal bleeding) 10/18/2018    History of CVA (cerebrovascular accident) 11/27/2022    History of CVA (cerebrovascular accident) without residual deficits 11/27/2022    History of hepatitis C     s/p succesful Rx w/ [facility]  - SVR12 (cure) 2016    Hypertensive retinopathy, bilateral 12/10/2019    Mild nonproliferative diabetic retinopathy 05/02/2018    Mixed hyperlipidemia 04/09/2018    Myalgia due to statin 09/07/2022    Nephrotic range proteinuria 04/09/2018    Normocytic anemia 01/14/2022    NS (nuclear sclerosis),  bilateral 12/10/2019    Peanut allergy 02/04/2023    PNA (pneumonia) 05/28/2019    Postoperative hypothyroidism     Prophylactic immunotherapy 05/24/2024    Pulmonary hypertension 02/06/2023    Renovascular hypertension 04/09/2018    S/P arteriovenous (AV) fistula creation 08/14/2019 8/14/19: L2 kyphoplasty with Dr. Kwon       S/P kidney transplant 05/24/2024    S/P kyphoplasty 08/14/2019 8/14/19: L2 kyphoplasty with Dr. Kwon     Slow transit constipation 05/27/2019    Type 2 diabetes mellitus with circulatory disorder, with long-term current use of insulin 04/09/2018    Type 2 diabetes mellitus with stage 3 chronic kidney disease, without long-term current use of insulin 04/09/2018    Type 2 diabetes mellitus with stage 3a chronic kidney disease, without long-term current use of insulin 03/03/2021    Type 2 diabetes mellitus with stage 3b chronic kidney disease, without long-term current use of insulin 09/07/2022     Past Surgical History:   Procedure Laterality Date    AV FISTULA PLACEMENT Left 10/27/2022    Procedure: CREATION, AV FISTULA;  Surgeon: Tirso Tavera MD;  Location: [facility] OR [address] [place] [address];  Service: Peripheral Vascular;  Laterality: Left;  Brachio basilic 1st stage     BACK SURGERY  2017    CHOLECYSTECTOMY  2015    [facility]ro    COLONOSCOPY  11/13/2015    Dr Chino torres prep. internal hemorrhoids. diverticulosis of sigmois. NO polyps. repeat due 2025    FISTULOGRAM N/A 12/16/2022    Procedure: Fistulogram;  Surgeon: Tirso Tavera MD;  Location: [facility] CATH LAB;  Service: Peripheral Vascular;  Laterality: N/A;    FISTULOGRAM, WITH PTA Left 3/23/2023    Procedure: FISTULOGRAM, WITH PTA;  Surgeon: Tirso Tavera MD;  Location: [facility] OR [facility] FLR;  Service: Peripheral Vascular;  Laterality: Left;  LUE TRANSRADIAL CO2 FISTULOGRAM    1.9 min  22.99 mGy  4.3032 Gy.cm  200cc CO2  9ml TransRadial Solution    FISTULOGRAM, WITH PTA Left 8/15/2023    Procedure: FISTULOGRAM, WITH PTA TRANSRADIAL;  Surgeon:  Tirso Tavera MD;  Location: [facility] OR [facility] FLR;  Service: Peripheral Vascular;  Laterality: Left;  Fluoro: 5.1 min, mGy: 7.95, Gycm2: 1.2626, contrast: 12ml    FIXATION KYPHOPLASTY Bilateral 08/14/2019    Procedure: L2 kyphoplasty Fluoro Globus;  Surgeon: Jeremie Kwon DO;  Location: [facility] OR;  Service: Neurosurgery;  Laterality: Bilateral;  GLOBUS MACRINA WINSLOW [phone] TEXTED HER @ 10:54AM ON 7-30-19  PRE-OP-BY RN  8-7-2019    HYSTERECTOMY      KIDNEY TRANSPLANT N/A 5/23/2024    Procedure: TRANSPLANT, KIDNEY;  Surgeon: Stepan Bernardo MD;  [facility];  Service: Transplant;  Laterality: N/A;    OOPHORECTOMY  1996    one    PERCUTANEOUS TRANSLUMINAL ANGIOPLASTY OF ARTERIOVENOUS FISTULA Left 12/16/2022    Procedure: PTA, AV FISTULA;  Surgeon: Tirso Tavera MD;  Location: [facility] CATH LAB;  Service: Peripheral Vascular;  Laterality: Left;    PERCUTANEOUS TRANSLUMINAL ANGIOPLASTY OF ARTERIOVENOUS FISTULA Left 11/30/2023    Procedure: PTA, AV FISTULA;  Surgeon: Tirso Tavera MD;  Location: [facility] CATH LAB;  Service: Peripheral Vascular;  Laterality: Left;  left transradial fistulagram    RIGHT HEART CATHETERIZATION Right 6/15/2023    Procedure: INSERTION, CATHETER, RIGHT HEART;  Surgeon: Lucretia Rivas MD;  Location: [facility] CATH LAB;  Service: Cardiology;  Laterality: Right;    THYROIDECTOMY  2016    at [facility] for thyroid nodule    TONSILLECTOMY      TRANSPOSITION OF BASILIC VEIN Left 1/31/2023    Procedure: TRANSPOSITION, VEIN, BASILIC;  Surgeon: Tirso Tavera MD;  [facility];  Service: Peripheral Vascular;  Laterality: Left;     Family History       Problem Relation (Age of Onset)    Aneurysm Brother    Coronary artery disease Mother    Diabetes type II Mother    Heart disease Mother    Hypertension Mother, Father    Prostate cancer Father    Stroke Mother          Tobacco Use    Smoking status: Never     Passive exposure: Past    Smokeless tobacco: Never   Substance and Sexual  "Activity    Alcohol use: Not Currently    Drug use: No    Sexual activity: Not Currently        Review of Systems   Constitutional:  Positive for appetite change. Negative for chills, fatigue and fever.   Respiratory:  Negative for cough, chest tightness and shortness of breath.    Cardiovascular:  Positive for chest pain. Negative for palpitations and leg swelling.   Gastrointestinal:  Negative for abdominal distention, abdominal pain, constipation, diarrhea, nausea and vomiting.   Genitourinary:  Negative for difficulty urinating, dysuria, frequency and urgency.   Musculoskeletal:  Negative for back pain and neck pain.   Skin:  Positive for wound. Negative for color change, pallor and rash.        See media    Allergic/Immunologic: Positive for immunocompromised state.   Neurological:  Negative for dizziness, weakness and headaches.   Psychiatric/Behavioral:  Negative for confusion and sleep disturbance.      Objective:     Vital Signs (Most Recent):  Temp: 98.4 °F (36.9 °C) (12/31/24 1509)  Pulse: 83 (12/31/24 2030)  Resp: 19 (12/31/24 2030)  BP: 132/78 (12/31/24 2030)  SpO2: 98 % (12/31/24 2030)  Height: 5' 9" (175.3 cm)  Weight: 103.4 kg (228 lb)  Body mass index is 33.67 kg/m².      Physical Exam  Vitals and nursing note reviewed.   Constitutional:       General: She is awake.      Appearance: Normal appearance.   Cardiovascular:      Rate and Rhythm: Normal rate and regular rhythm.      Pulses: Normal pulses.   Pulmonary:      Effort: Pulmonary effort is normal. No respiratory distress.      Breath sounds: Normal breath sounds. No decreased breath sounds, wheezing or rales.   Abdominal:      General: Bowel sounds are normal. There is no distension.      Palpations: Abdomen is soft.      Tenderness: There is no abdominal tenderness. There is no guarding.   Genitourinary:     Comments: Erythematous skin excoriation/maceration noted to labia. No overt signs of infection.   Musculoskeletal:         General: " Normal range of motion.   Skin:     General: Skin is warm and dry.   Neurological:      Mental Status: She is alert and oriented to person, place, and time.   Psychiatric:         Behavior: Behavior is cooperative.          Laboratory  CBC:   Recent Labs   Lab 12/31/24  1631   WBC 9.83   RBC 4.50   HGB 11.7*   HCT 36.8*   *   MCV 82   MCH 26.0*   MCHC 31.8*     CMP:   Recent Labs   Lab 12/31/24  1631   *   CALCIUM 9.0   ALBUMIN 3.7   PROT 7.6      K 3.6   CO2 23      BUN 10   CREATININE 1.1   ALKPHOS 95   ALT 9*   AST 10      See cardiac markers in results   Labs within the past 24 hours have been reviewed.    Diagnostic Results:  US - Kidney: No results found for this or any previous visit.    Assessment/Plan:     Psychiatric  Generalized anxiety disorder  - continue home lexapro      Cardiac/Vascular  Renovascular hypertension  - continue home coreg/nifedipine       Chronic diastolic heart failure  - last TTE 2/23 normal systolic function, diastolic dysfunction noted, PA pressure 58 mmhg.     Chest pain  - chest pain x 1 day associated with chills, nausea, and general malaise  - Labs in the ER stable.  - EKG with NSR  - Troponin pending  - Infectious work up sent: blood cultures, RIP, CXR    Mixed hyperlipidemia  - continue home zetia      Renal/  Genital lesion, female  - present since Thanksgiving  - Would refer to wound care outpatient due to delayed healing  - Discharged on Doxycycline   - RPR and HSV in process     S/P kidney transplant 5/23/24.  - S/p Living kidney txp May 2024 with BL Cr 1-1.4  - Cr at baseline      Immunology/Multi System  Prophylactic immunotherapy  - Continue prograf, cellcept, and prednisone  - Check prograf level daily and adjust for therapeutic dosage. Monitor for toxic side effects       Oncology  Anemia of chronic disease  - daily CBC      Endocrine  Controlled type 2 diabetes mellitus with stable proliferative retinopathy of both eyes, unspecified  "whether long term insulin use  - endocrine consulted, appreciate assistance      Palliative Care  Long-term use of immunosuppressant medication  - see "prophylactic immunotherapy"          Discharge Planning:  To home       Felipa Reich, WHITNEY  Kidney Transplant  [facility] - Emergency Dept  "

## 2025-01-01 NOTE — DISCHARGE INSTRUCTIONS
Your syphilis screening labs were positive but this does not necessarily mean that you have syphilis.  Confirmatory testing has been ordered in his still pending.  We are going to treat you empirically for both herpes and syphilis with antibiotics and antiviral medication.

## 2025-01-02 LAB
BACTERIA UR CULT: NORMAL
BACTERIA UR CULT: NORMAL
RPR SER QL: NORMAL

## 2025-01-02 NOTE — PROGRESS NOTES
FOLLOW-UP PATIENT VISIT  The patient location is: home  The chief complaint leading to consultation is:   Chief Complaint   Patient presents with    Hypothyroidism       Visit type: audiovisual    Face to Face time with patient: 20 minutes  25 minutes of total time spent on the encounter, which includes face to face time and non-face to face time preparing to see the patient (eg, review of tests), Obtaining and/or reviewing separately obtained history, Documenting clinical information in the electronic or other health record, Independently interpreting results (not separately reported) and communicating results to the patient/family/caregiver, or Care coordination (not separately reported).    Each patient to whom he or she provides medical services by telemedicine is:  (1) informed of the relationship between the physician and patient and the respective role of any other health care provider with respect to management of the patient; and (2) notified that he or she may decline to receive medical services by telemedicine and may withdraw from such care at any time.    Subjective:      Chief Complaint: Diabetes/thyroid    HPI: Kathie Quezada is a 64 y.o. female who is here for type 2 diabetes mellitus  PMH includes: ESRD s/p kidney transplant (05/2024) on immunosuppression with prednisone and tacrolimus, hypothyroidism, CAD, CVA, factor XII deficiency, hepatitis-C (s/p treatment), pulmonary hypertension, diastolic heart failure     LOV with Dr. Honeycutt in Oct 2024  At that time, pt was found to have suppressed TSH; plan was to decrease levothyroxine to 125 mcg daily and recheck TFTs.    She has not always been using Lantus bc her BG has been low since starting Mounjaro. She has only used Lantus once in the last 7 days.    Regarding diabetes:  Initially Diagnosed with T2D: 2005, was on insulin but stopped it when she got her kidney transplant.     Known diabetic complications: nephropathy, retinopathy,  peripheral neuropathy, and cardiovascular disease  Cardiovascular risk factors: diabetes mellitus, dyslipidemia, hypertension, and obesity (BMI >= 30 kg/m2)    Current diabetic medications include:   Lantus 8u qHS sometimes (was 18)  Novolog 4 units with breakfast (was 8 TIDWM)  Mounjaro 5 mg  MDSSI      Other medications tried:  Metformin - diarrhea    Diet:   Breakfast: egg sandwich with cheese, boiled egg, does not like to eat in the morning  Lunch: Turkey sandwich with cheese and matthew and 2 slices of bread  Dinner: Home cooked, cabbage, red beans, chicken  Snack: cookies, rice cakes, likes to snack at bedtime    Exercise: walking every day    Glucose Trends:  Freestyle ban        Episodes of hypoglycemia? Yes in the 60-70s , usually when she's asleep. Says they happen every other day.    Family Hx of DM:   Mother - DM2   Father - denies           Wt Readings from Last 10 Encounters:   12/31/24 103.4 kg (228 lb)   11/04/24 106.7 kg (235 lb 3.7 oz)   10/22/24 106.4 kg (234 lb 7.4 oz)   10/11/24 101.2 kg (223 lb)   09/24/24 103.5 kg (228 lb 2.8 oz)   08/07/24 101.2 kg (223 lb)   07/31/24 102.8 kg (226 lb 10.1 oz)   07/17/24 101.9 kg (224 lb 10.4 oz)   07/08/24 102.2 kg (225 lb 5 oz)   06/19/24 104 kg (229 lb 4.5 oz)       Diabetes Management Status  Statin: Not taking due to myopathy, she is on Ezetimibe 10 mg daily  ACE/ARB: Not taking    Screening or Prevention Patient's value   HgA1C Testing and Control   Lab Results   Component Value Date    HGBA1C 5.5 05/23/2024    HGBA1C 5.3 08/01/2023    HGBA1C 5.2 02/02/2023    HGBA1C 5.5 11/17/2022    HGBA1C 6.8 (H) 09/07/2022    HGBA1C 6.2 (H) 12/21/2021    HGBA1C 5.7 (H) 12/01/2020    HGBA1C 5.9 (H) 11/03/2020    HGBA1C 5.6 05/13/2020    HGBA1C 5.7 (H) 01/09/2020      Lipid profile : 05/02/2024   LDL control Lab Results   Component Value Date    LDLCALC 90.6 05/02/2024      Nephropathy screening Lab Results   Component Value Date    LABMICR 1896.0 04/09/2018     Lab  Results   Component Value Date    PROTEINUA Negative 12/31/2024      Blood pressure BP Readings from Last 1 Encounters:   12/31/24 107/65      Dilated retinal exam : 08/06/2024   Foot exam   : 09/24/2024        Regarding Postoperative Hypothyroidism:    - Duration of hypothyroidism: s/p total thyroidectomy over 5 years ago at [facility] for MNG with compressive sxs  - Current relevant medications: levothyroxine T4 (Synthroid) 150 mcg daily  - Takes thyroid medications properly  - Denies s/s of hypothyroidism    Most recent TSH was 0.018 so was decreased to 125 mcg; due to get recheck on 1/6/25.      Regarding Osteoporosis:  - Most recent DEXA: 10/11/24, Osteoporosis based on low trauma spine fracture in 2010        Recently prescribed alendronate, started taking it about 2 months ago.  No GERD sxs.      Had teeth pulled last month. Is not planning to get any more pulled anytime soon, but might do it after a while.       ROS: See HPI      Reviewed past medical, family, social history and updated as appropriate.    Objective:       Vitals and physical not performed - virtual visit        Assessment/Plan:     Problem List Items Addressed This Visit          Endocrine    Postoperative hypothyroidism (Chronic)     On LT4 125 mcg given TSH was suppressed on last recheck.  -Labs scheduled for next Monday          Controlled type 2 diabetes mellitus with stable proliferative retinopathy of both eyes, unspecified whether long term insulin use (Chronic)     CGM data reviewed showing excellent control with 97% TIR. Not having too many lows on CGM but pt tells me they do happen at night when she's sleeping. Since starting Mounjaro she hasn't been using much insulin and doing well regardless.    -Stop insulin Lantus and Novolog  -Increase Mounjaro to 7.5 mg weekly  -Will let us know if BG levels are higher in which case we can restart basal  -DM maintenance topics addressed in HPI         Class 2 severe obesity due to excess calories  with serious comorbidity and body mass index (BMI) of 35.0 to 35.9 in adult - Primary       Orthopedic    Osteoporosis with current pathological fracture     Currently on alendronate started Oct 2024.  Done with dental work for now, she will let us know if any other further plans for dental work.    -Repeat BMD 2026  -Continue alendronate  -Ca + vit D recommended  -Fall precautions discussed          Other Visit Diagnoses       Age-related osteoporosis without current pathological fracture                  RTC 4 months    Visit today included increased complexity associated with the care of the episodic problem addressed and managing the longitudinal care of the patient due to the serious and/or complex managed problem(s).      Martina Urena MD  [facility]

## 2025-01-03 ENCOUNTER — OFFICE VISIT (OUTPATIENT)
Dept: ENDOCRINOLOGY | Facility: CLINIC | Age: 65
End: 2025-01-03
Payer: MEDICARE

## 2025-01-03 DIAGNOSIS — E66.01 CLASS 2 SEVERE OBESITY DUE TO EXCESS CALORIES WITH SERIOUS COMORBIDITY AND BODY MASS INDEX (BMI) OF 35.0 TO 35.9 IN ADULT: Primary | ICD-10-CM

## 2025-01-03 DIAGNOSIS — E11.3553 CONTROLLED TYPE 2 DIABETES MELLITUS WITH STABLE PROLIFERATIVE RETINOPATHY OF BOTH EYES, UNSPECIFIED WHETHER LONG TERM INSULIN USE: ICD-10-CM

## 2025-01-03 DIAGNOSIS — M81.0 AGE-RELATED OSTEOPOROSIS WITHOUT CURRENT PATHOLOGICAL FRACTURE: ICD-10-CM

## 2025-01-03 DIAGNOSIS — E89.0 POSTOPERATIVE HYPOTHYROIDISM: Chronic | ICD-10-CM

## 2025-01-03 DIAGNOSIS — M80.80XA OTHER OSTEOPOROSIS WITH CURRENT PATHOLOGICAL FRACTURE, INITIAL ENCOUNTER: ICD-10-CM

## 2025-01-03 DIAGNOSIS — E66.812 CLASS 2 SEVERE OBESITY DUE TO EXCESS CALORIES WITH SERIOUS COMORBIDITY AND BODY MASS INDEX (BMI) OF 35.0 TO 35.9 IN ADULT: Primary | ICD-10-CM

## 2025-01-03 LAB
HSV-1 DNA BY PCR: NEGATIVE
HSV-2 DNA BY PCR: NEGATIVE
POCT GLUCOSE: 154 MG/DL (ref 70–110)

## 2025-01-03 RX ORDER — METHYLPREDNISOLONE 4 MG/1
TABLET ORAL
COMMUNITY
Start: 2024-09-05

## 2025-01-03 RX ORDER — OXYCODONE AND ACETAMINOPHEN 5; 325 MG/1; MG/1
1 TABLET ORAL
COMMUNITY
Start: 2024-09-05

## 2025-01-03 RX ORDER — IBUPROFEN 800 MG/1
800 TABLET ORAL EVERY 6 HOURS
COMMUNITY
Start: 2024-09-05 | End: 2025-01-03

## 2025-01-03 RX ORDER — CLINDAMYCIN HYDROCHLORIDE 300 MG/1
CAPSULE ORAL
COMMUNITY
Start: 2024-09-05

## 2025-01-03 NOTE — ASSESSMENT & PLAN NOTE
Currently on alendronate started Oct 2024.  Done with dental work for now, she will let us know if any other further plans for dental work.    -Repeat BMD 2026  -Continue alendronate  -Ca + vit D recommended  -Fall precautions discussed

## 2025-01-03 NOTE — ASSESSMENT & PLAN NOTE
CGM data reviewed showing excellent control with 97% TIR. Not having too many lows on CGM but pt tells me they do happen at night when she's sleeping. Since starting Mounjaro she hasn't been using much insulin and doing well regardless.    -Stop insulin Lantus and Novolog  -Increase Mounjaro to 7.5 mg weekly  -Will let us know if BG levels are higher in which case we can restart basal  -DM maintenance topics addressed in HPI

## 2025-01-03 NOTE — PROGRESS NOTES
I have reviewed and concur with Dr. Urena's history, physical, assessment, and plan.  I have personally interviewed and examined the patient.    Alejandra Che MD

## 2025-01-05 LAB
BACTERIA BLD CULT: NORMAL
BACTERIA BLD CULT: NORMAL

## 2025-01-06 LAB — ARUP MISCELLANEOUS TEST 1: ABNORMAL

## 2025-01-09 ENCOUNTER — OFFICE VISIT (OUTPATIENT)
Dept: CARDIOLOGY | Facility: CLINIC | Age: 65
End: 2025-01-09
Payer: MEDICARE

## 2025-01-09 DIAGNOSIS — E78.2 MIXED HYPERLIPIDEMIA: ICD-10-CM

## 2025-01-09 DIAGNOSIS — I50.32 CHRONIC DIASTOLIC HEART FAILURE: ICD-10-CM

## 2025-01-09 DIAGNOSIS — E11.42 DIABETIC POLYNEUROPATHY ASSOCIATED WITH TYPE 2 DIABETES MELLITUS: Primary | Chronic | ICD-10-CM

## 2025-01-09 DIAGNOSIS — E66.09 CLASS 1 OBESITY DUE TO EXCESS CALORIES WITH SERIOUS COMORBIDITY AND BODY MASS INDEX (BMI) OF 33.0 TO 33.9 IN ADULT: ICD-10-CM

## 2025-01-09 DIAGNOSIS — I20.89 ANGINAL CHEST PAIN AT REST: ICD-10-CM

## 2025-01-09 DIAGNOSIS — D63.8 ANEMIA OF CHRONIC DISEASE: ICD-10-CM

## 2025-01-09 DIAGNOSIS — E66.811 CLASS 1 OBESITY DUE TO EXCESS CALORIES WITH SERIOUS COMORBIDITY AND BODY MASS INDEX (BMI) OF 33.0 TO 33.9 IN ADULT: ICD-10-CM

## 2025-01-09 DIAGNOSIS — I27.20 PULMONARY HYPERTENSION: ICD-10-CM

## 2025-01-09 DIAGNOSIS — Z79.60 LONG-TERM USE OF IMMUNOSUPPRESSANT MEDICATION: ICD-10-CM

## 2025-01-09 NOTE — PROGRESS NOTES
Subjective:   Patient ID:  Kathie Quezada is a 64 y.o. female who presents for follow-up of No chief complaint on file.    Hypertension   Mild pulmonary HTN   Coronary calcification on chest CT  Hx of CVA ~2007  DM Type 2  ESRD on HD  Factor 11 deficiency      ER visit  Kathie Quezada is a 64 yr old AAF s/p LRRT 5/23/24 for ESRD secondary to DM. PMH includes CVA 2017 w/o residual deficits, CHF, factor XII deficiency, history of treated HCV, and pHTN (RHC performed on 6/7/23 and found to have group 2 HTN. TTE and cardiac PET with preserved EF). Post-op course has been uneventful to date. Baseline Cr 1.1-1.4. She presented to the ER with complaints of chest pain and lesion on labia with drainage. Labial lesion present since thanksgiving. Chest pain started overnight associated worsening with deep breaths, nausea, and chills. Pain initially constant but eased off, but she woke up this morning still having pain. She reports pain similar to the past when she had CHF. Labs in the ER stable at baseline. BNP mildly elevated 113. EKG with NSR. Troponin x 2 negative. Infectious work up sent: blood cultures in process , UA negative, CXR without acute findings, and respiratory infection panel non-detected.  She was noted to have a reactive Treponema Pallidum Antibodies (IgG, IgM) RPR, HSV in process - patient discharged on doxycycline for  clitoris ulcer  Will send message to her coordinator for follow up.         Echo 02/23  Moderate left atrial enlargement.  The left ventricle is normal in size with concentric hypertrophy and normal systolic function.  Left ventricular diastolic dysfunction.  Normal right ventricular size with normal right ventricular systolic function.  Mild mitral regurgitation.  Moderate tricuspid regurgitation.  Intermediate central venous pressure (8 mmHg).  There is pulmonary hypertension. The estimated PA systolic pressure is 58 mmHg.  Small lateral pericardial effusion.  The estimated PA  systolic pressure is 58 mmHg.     HPI  She is doing wel since the renal transplant  Chest pain associated with pleuritic sx are now normal.She was prescribed valacyclovir in ER that made her chest hurt again so she stopped taking it.   Denies palpitations or fluttering in the chest  Mother  of a heart attack at 64      PET stress    The myocardial perfusion images are normal without evidence of ischemia or scar.    The whole heart absolute myocardial perfusion values averaged 1.03 cc/min/g at rest, which is elevated; 1.40 cc/min/g at stress, which is mildly reduced; and CFR is 1.36 , which is moderately reduced in part due to elevated resting flow.    CT attenuation images demonstrate no coronary calcifications and no aortic calcifications.    The gated perfusion images showed an ejection fraction of 67% at rest and 68% during stress. A normal ejection fraction is greater than 47%.    The wall motion is normal at rest and during stress.    The LV cavity size is normal at rest and stress.    The EKG portion of this study is negative for ischemia.    During stress, occasional PVCs are noted.    The patient reported no chest pain during the stress test.    There are no prior studies for comparison.      Patient Active Problem List   Diagnosis    Postoperative hypothyroidism    Mixed hyperlipidemia    History of hepatitis C    Chest pain    Chronic diastolic heart failure    Factor XI deficiency    Vitamin D deficiency    Generalized anxiety disorder    Hypertensive retinopathy, bilateral    Class 1 obesity due to excess calories with body mass index (BMI) of 33.0 to 33.9 in adult    Diabetic polyneuropathy associated with type 2 diabetes mellitus    Controlled type 2 diabetes mellitus with stable proliferative retinopathy of both eyes, unspecified whether long term insulin use    Myalgia due to statin    Osteoporosis with current pathological fracture    Chronic allergic rhinitis    Iron deficiency anemia     Metabolic bone disease    Pulmonary hypertension    Anemia of chronic disease    Secondary hyperparathyroidism of renal origin    S/P kidney transplant 5/23/24.    Long-term use of immunosuppressant medication    Prophylactic immunotherapy    Adrenal cortical steroids causing adverse effect in therapeutic use    Renovascular hypertension    Genital lesion, female    Class 2 severe obesity due to excess calories with serious comorbidity and body mass index (BMI) of 35.0 to 35.9 in adult     There were no vitals taken for this visit.  There is no height or weight on file to calculate BMI.  CrCl cannot be calculated (Patient's most recent lab result is older than the maximum 7 days allowed.).    Lab Results   Component Value Date     12/31/2024    K 3.6 12/31/2024     12/31/2024    CO2 23 12/31/2024    BUN 10 12/31/2024    CREATININE 1.1 12/31/2024     (H) 12/31/2024    HGBA1C 5.5 05/23/2024    MG 1.8 12/23/2024    AST 10 12/31/2024    ALT 9 (L) 12/31/2024    ALBUMIN 3.7 12/31/2024    PROT 7.6 12/31/2024    BILITOT 0.7 12/31/2024    WBC 9.83 12/31/2024    HGB 11.7 (L) 12/31/2024    HCT 36.8 (L) 12/31/2024    HCT 38 05/23/2024    MCV 82 12/31/2024     (L) 12/31/2024    INR 1.1 05/27/2024    INR 1.2 09/11/2022    TSH 0.018 (L) 10/22/2024    CHOL 176 05/02/2024    HDL 60 05/02/2024    LDLCALC 90.6 05/02/2024    TRIG 127 05/02/2024       Lab Results   Component Value Date    .9 (H) 05/26/2019         No results found for this or any previous visit.        Current Outpatient Medications   Medication Sig    bempedoic acid-ezetimibe 180-10 mg Tab Take 1 tablet by mouth once daily.    blood sugar diagnostic Strp 1 each by Misc.(Non-Drug; Combo Route) route 3 (three) times daily.    blood-glucose meter Misc Use as instructed    blood-glucose sensor (FREESTYLE LIS 3 SENSOR) Evelyn Change sensor every 15 days.    blood-glucose sensor (FREESTYLE LIS 3 SENSOR) Evelyn 2 each by Misc.(Non-Drug; Combo  "Route) route every 14 (fourteen) days.    carvediloL (COREG) 6.25 MG tablet Take 1 tablet (6.25 mg total) by mouth 2 (two) times daily.    cholecalciferol, vitamin D3, 125 mcg (5,000 unit) Tab Take 1 tablet (5,000 Units total) by mouth once daily.    clindamycin (CLEOCIN) 300 MG capsule TAKE 1 CAPSULE BY MOUTH EVERY 6 HOURS    doxycycline (VIBRAMYCIN) 100 MG Cap Take 1 capsule (100 mg total) by mouth 2 (two) times daily. for 14 days    EScitalopram oxalate (LEXAPRO) 10 MG tablet Take 1 tablet (10 mg total) by mouth every evening.    lancets Misc 1 each by Misc.(Non-Drug; Combo Route) route 3 (three) times daily.    levocetirizine (XYZAL) 5 MG tablet Take 1 tablet (5 mg total) by mouth daily as needed for Allergies.    levothyroxine (SYNTHROID) 125 MCG tablet Take 1 tablet (125 mcg total) by mouth before breakfast.    methylPREDNISolone (MEDROL DOSEPACK) 4 mg tablet FOLLOW PACKAGE DIRECTIONS    mycophenolate (CELLCEPT) 250 mg Cap Take 3 capsules (750 mg total) by mouth 2 (two) times daily.    NIFEdipine (PROCARDIA-XL) 30 MG (OSM) 24 hr tablet Take 1 tablet (30 mg total) by mouth once daily.    oxyCODONE-acetaminophen (PERCOCET) 5-325 mg per tablet Take 1 tablet by mouth every 4 to 6 hours as needed. (Patient not taking: Reported on 1/3/2025)    pen needle, diabetic 32 gauge x 5/32" Ndle 1 each by Misc.(Non-Drug; Combo Route) route 3 (three) times daily with meals.    predniSONE (DELTASONE) 5 MG tablet Take by mouth daily; 5/26/2024-6/1/2024: 20 mg, 6/2/2024-6/8/2024: 15 mg; 6/9/2024-6/15/2024: 10 mg; 6/16/2024- forever: 5 mg; do not stop    tacrolimus (PROGRAF) 1 MG Cap Take 3 capsules (3 mg total) by mouth once daily AND 2 capsules (2 mg total) every evening.    tirzepatide 7.5 mg/0.5 mL PnIj Inject once weekly subcutaneously    traZODone (DESYREL) 50 MG tablet Take 1 to 2 tablets ( mg total) by mouth nightly as needed for Insomnia.    valACYclovir (VALTREX) 1000 MG tablet Take 1 tablet (1,000 mg total) by " mouth every 12 (twelve) hours. for 7 days     No current facility-administered medications for this visit.       Review of Systems   Constitutional: Negative for chills, decreased appetite, malaise/fatigue, night sweats, weight gain and weight loss.   Eyes:  Negative for blurred vision, double vision, visual disturbance and visual halos.   Cardiovascular:  Negative for chest pain, claudication, cyanosis, dyspnea on exertion, irregular heartbeat, leg swelling, near-syncope, orthopnea, palpitations, paroxysmal nocturnal dyspnea and syncope.   Respiratory:  Negative for cough, hemoptysis, snoring, sputum production and wheezing.    Endocrine: Negative for cold intolerance, heat intolerance, polydipsia and polyphagia.   Hematologic/Lymphatic: Negative for adenopathy and bleeding problem. Does not bruise/bleed easily.   Skin:  Negative for flushing, itching, poor wound healing and rash.   Musculoskeletal:  Positive for stiffness. Negative for arthritis, back pain, falls, gout, joint pain, joint swelling, muscle cramps, muscle weakness, myalgias and neck pain.   Gastrointestinal:  Negative for bloating, abdominal pain, anorexia, diarrhea, dysphagia, excessive appetite, flatus, hematemesis, jaundice, melena and nausea.   Genitourinary:  Negative for hesitancy and incomplete emptying.   Neurological:  Negative for aphonia, brief paralysis, difficulty with concentration, disturbances in coordination, excessive daytime sleepiness, dizziness, focal weakness, light-headedness, loss of balance and weakness.   Psychiatric/Behavioral:  Negative for altered mental status, depression, hallucinations, hypervigilance, memory loss, substance abuse and suicidal ideas. The patient does not have insomnia and is not nervous/anxious.        Objective:   Physical Exam  Vitals reviewed: n/a.         Assessment:     1. Diabetic polyneuropathy associated with type 2 diabetes mellitus    2. Anginal chest pain at rest    3. Chronic diastolic  heart failure    4. Mixed hyperlipidemia    5. Pulmonary hypertension    6. Anemia of chronic disease    7. Class 1 obesity due to excess calories with serious comorbidity and body mass index (BMI) of 33.0 to 33.9 in adult    8. Long-term use of immunosuppressant medication        Plan:   Atypical chest pain resolved. Patient doing well after kidney transplant. Change zetia to nexlizet  Diagnoses and all orders for this visit:    Diabetic polyneuropathy associated with type 2 diabetes mellitus    Anginal chest pain at rest  -     Ambulatory referral/consult to Cardiology    Chronic diastolic heart failure    Mixed hyperlipidemia    Pulmonary hypertension    Anemia of chronic disease    Class 1 obesity due to excess calories with serious comorbidity and body mass index (BMI) of 33.0 to 33.9 in adult    Long-term use of immunosuppressant medication    Other orders  -     bempedoic acid-ezetimibe 180-10 mg Tab; Take 1 tablet by mouth once daily.      RTC 1 yr for lipid management, LDL goal of 60 and below

## 2025-01-27 ENCOUNTER — PATIENT MESSAGE (OUTPATIENT)
Dept: TRANSPLANT | Facility: CLINIC | Age: 65
End: 2025-01-27
Payer: MEDICARE

## 2025-01-27 DIAGNOSIS — Z94.0 S/P KIDNEY TRANSPLANT: ICD-10-CM

## 2025-01-27 RX ORDER — TACROLIMUS 1 MG/1
CAPSULE ORAL
Qty: 240 CAPSULE | Refills: 11 | Status: SHIPPED | OUTPATIENT
Start: 2025-01-27

## 2025-01-27 NOTE — TELEPHONE ENCOUNTER
Message sent.     ----- Message from Fawn Elkins MD sent at 1/26/2025  9:33 AM CST -----  Cr trending up. Check lab in a week after good hydration  Tac to 2 mg bid if it is a true level

## 2025-01-29 ENCOUNTER — PATIENT MESSAGE (OUTPATIENT)
Dept: INFECTIOUS DISEASES | Facility: CLINIC | Age: 65
End: 2025-01-29
Payer: MEDICARE

## 2025-01-29 DIAGNOSIS — F41.1 GENERALIZED ANXIETY DISORDER: Chronic | ICD-10-CM

## 2025-01-29 RX ORDER — ESCITALOPRAM OXALATE 10 MG/1
10 TABLET ORAL NIGHTLY
Qty: 90 TABLET | Refills: 3 | Status: SHIPPED | OUTPATIENT
Start: 2025-01-29

## 2025-01-29 NOTE — TELEPHONE ENCOUNTER
No care due was identified.  [facility] Embedded Care Due Messages. Reference number: [id number].   1/29/2025 10:15:50 AM CST

## 2025-01-30 ENCOUNTER — OFFICE VISIT (OUTPATIENT)
Dept: INTERNAL MEDICINE | Facility: CLINIC | Age: 65
End: 2025-01-30
Payer: MEDICARE

## 2025-01-30 VITALS
HEIGHT: 69 IN | WEIGHT: 215.19 LBS | OXYGEN SATURATION: 96 % | SYSTOLIC BLOOD PRESSURE: 114 MMHG | DIASTOLIC BLOOD PRESSURE: 82 MMHG | HEART RATE: 95 BPM | BODY MASS INDEX: 31.87 KG/M2

## 2025-01-30 DIAGNOSIS — D63.8 ANEMIA OF CHRONIC DISEASE: ICD-10-CM

## 2025-01-30 DIAGNOSIS — F41.1 GENERALIZED ANXIETY DISORDER: Chronic | ICD-10-CM

## 2025-01-30 DIAGNOSIS — Z29.89 PROPHYLACTIC IMMUNOTHERAPY: ICD-10-CM

## 2025-01-30 DIAGNOSIS — I50.32 CHRONIC DIASTOLIC HEART FAILURE: Chronic | ICD-10-CM

## 2025-01-30 DIAGNOSIS — E55.9 VITAMIN D DEFICIENCY: ICD-10-CM

## 2025-01-30 DIAGNOSIS — E11.3553 CONTROLLED TYPE 2 DIABETES MELLITUS WITH STABLE PROLIFERATIVE RETINOPATHY OF BOTH EYES, UNSPECIFIED WHETHER LONG TERM INSULIN USE: Chronic | ICD-10-CM

## 2025-01-30 DIAGNOSIS — Z00.00 ANNUAL PHYSICAL EXAM: Primary | ICD-10-CM

## 2025-01-30 DIAGNOSIS — E66.09 CLASS 1 OBESITY DUE TO EXCESS CALORIES WITH SERIOUS COMORBIDITY AND BODY MASS INDEX (BMI) OF 31.0 TO 31.9 IN ADULT: ICD-10-CM

## 2025-01-30 DIAGNOSIS — I27.20 PULMONARY HYPERTENSION: ICD-10-CM

## 2025-01-30 DIAGNOSIS — T46.6X5A MYALGIA DUE TO STATIN: ICD-10-CM

## 2025-01-30 DIAGNOSIS — D68.1 FACTOR XI DEFICIENCY: Chronic | ICD-10-CM

## 2025-01-30 DIAGNOSIS — N25.81 SECONDARY HYPERPARATHYROIDISM OF RENAL ORIGIN: ICD-10-CM

## 2025-01-30 DIAGNOSIS — T38.0X5A ADRENAL CORTICAL STEROIDS CAUSING ADVERSE EFFECT IN THERAPEUTIC USE: ICD-10-CM

## 2025-01-30 DIAGNOSIS — M79.10 MYALGIA DUE TO STATIN: ICD-10-CM

## 2025-01-30 DIAGNOSIS — E78.2 MIXED HYPERLIPIDEMIA: ICD-10-CM

## 2025-01-30 DIAGNOSIS — M89.8X9 METABOLIC BONE DISEASE: ICD-10-CM

## 2025-01-30 DIAGNOSIS — Z79.60 LONG-TERM USE OF IMMUNOSUPPRESSANT MEDICATION: ICD-10-CM

## 2025-01-30 DIAGNOSIS — E11.42 DIABETIC POLYNEUROPATHY ASSOCIATED WITH TYPE 2 DIABETES MELLITUS: Chronic | ICD-10-CM

## 2025-01-30 DIAGNOSIS — M81.8 OTHER OSTEOPOROSIS WITHOUT CURRENT PATHOLOGICAL FRACTURE: ICD-10-CM

## 2025-01-30 DIAGNOSIS — E66.811 CLASS 1 OBESITY DUE TO EXCESS CALORIES WITH SERIOUS COMORBIDITY AND BODY MASS INDEX (BMI) OF 31.0 TO 31.9 IN ADULT: ICD-10-CM

## 2025-01-30 DIAGNOSIS — E89.0 POSTOPERATIVE HYPOTHYROIDISM: Chronic | ICD-10-CM

## 2025-01-30 DIAGNOSIS — I15.0 RENOVASCULAR HYPERTENSION: ICD-10-CM

## 2025-01-30 DIAGNOSIS — Z94.0 S/P KIDNEY TRANSPLANT: ICD-10-CM

## 2025-01-30 PROBLEM — E66.812 CLASS 2 SEVERE OBESITY DUE TO EXCESS CALORIES WITH SERIOUS COMORBIDITY AND BODY MASS INDEX (BMI) OF 35.0 TO 35.9 IN ADULT: Status: RESOLVED | Noted: 2025-01-03 | Resolved: 2025-01-30

## 2025-01-30 PROBLEM — E66.01 CLASS 2 SEVERE OBESITY DUE TO EXCESS CALORIES WITH SERIOUS COMORBIDITY AND BODY MASS INDEX (BMI) OF 35.0 TO 35.9 IN ADULT: Status: RESOLVED | Noted: 2025-01-03 | Resolved: 2025-01-30

## 2025-01-30 PROBLEM — R07.9 CHEST PAIN: Status: RESOLVED | Noted: 2019-01-14 | Resolved: 2025-01-30

## 2025-01-30 PROCEDURE — 99999 PR PBB SHADOW E&M-EST. PATIENT-LVL III: CPT | Mod: PBBFAC,HCNC,, | Performed by: INTERNAL MEDICINE

## 2025-01-30 RX ORDER — ALENDRONATE SODIUM 70 MG/1
70 TABLET ORAL
Qty: 12 TABLET | Refills: 3 | Status: SHIPPED | OUTPATIENT
Start: 2025-01-30

## 2025-01-30 RX ORDER — TRAZODONE HYDROCHLORIDE 50 MG/1
50-100 TABLET ORAL NIGHTLY PRN
Qty: 120 TABLET | Refills: 3 | Status: SHIPPED | OUTPATIENT
Start: 2025-01-30

## 2025-01-30 RX ORDER — NIFEDIPINE 30 MG/1
30 TABLET, EXTENDED RELEASE ORAL DAILY
Qty: 90 TABLET | Refills: 3 | Status: SHIPPED | OUTPATIENT
Start: 2025-01-30 | End: 2025-01-30

## 2025-01-30 NOTE — PROGRESS NOTES
INTERNAL MEDICINE CLINIC  Follow-up Visit Progress Note     PRESENTING HISTORY     PCP: Elan Price MD    Last Clinic Visit with me:  9-24-24    Current Chief Complaint/Problem:    Chief Complaint   Patient presents with    Annual Exam       History of Present Illness & ROS: Ms. Kathie Quezada is a 64 y.o. female.    She has been feeling tired.  Weak and dizzy. Dry eyes.  Headache. Neck pain.     She has been losing a lot of weight on Mounjaro - discussed monitoring blood sugar level, call Endocrine if blood sugar < 60 to adjust Mounjaro dose. Drink plenty of fluid.    Also she stopped the Lexapro a week ago. Instructed to restart medication and will discuss weaning off med in the future after weight is stable.    PAST HISTORY:     Past Medical History:   Diagnosis Date    Acute respiratory failure with hypoxia and hypercapnia 05/26/2019    ATN (acute tubular necrosis) 02/08/2019    Chronic diastolic heart failure 02/07/2019    2-8-19 Mild left atrial enlargement. Mild left ventricular enlargement. Normal left ventricular systolic function. The estimated ejection fraction is 55% Indeterminate left ventricular diastolic function. Normal right ventricular systolic function. Mild mitral regurgitation. Mild to moderate tricuspid regurgitation. The estimated PA systolic pressure is 32 mm Hg Normal central venous pressure (3 m    CKD (chronic kidney disease) stage 4, GFR 15-29 ml/min 05/16/2018    CKD stage G3a/A3, GFR 45-59 and albumin creatinine ratio >300 mg/g 05/16/2018    CKD stage G3b/A3, GFR 30-44 and albumin creatinine ratio >300 mg/g 05/16/2018    Closed compression fracture of L2 lumbar vertebra 05/24/2019    Closed compression fracture of L2 lumbar vertebra 05/24/2019     IMO Regulatory Load October 2019    Clotted dialysis access 07/22/2023    Controlled type 2 diabetes with retinopathy 04/09/2018    COVID-19 virus infection 7-1-2021 07/01/2021    CVA (cerebral vascular accident) 2017    Diabetic  polyneuropathy associated with type 2 diabetes mellitus 12/04/2020    Encounter for blood transfusion     Essential hypertension 04/09/2018    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  7-31-19 Factor XI Activity 55 - 145 % 96       Gastroesophageal reflux disease without esophagitis 05/24/2019    Generalized anxiety disorder 11/08/2019    GERD (gastroesophageal reflux disease)     GIB (gastrointestinal bleeding) 10/18/2018    History of CVA (cerebrovascular accident) 11/27/2022    History of CVA (cerebrovascular accident) without residual deficits 11/27/2022    History of hepatitis C     s/p succesful Rx w/ Harvoni  - SVR12 (cure) 2016    Hypertensive retinopathy, bilateral 12/10/2019    Mild nonproliferative diabetic retinopathy 05/02/2018    Mixed hyperlipidemia 04/09/2018    Myalgia due to statin 09/07/2022    Nephrotic range proteinuria 04/09/2018    Normocytic anemia 01/14/2022    NS (nuclear sclerosis), bilateral 12/10/2019    Other osteoporosis without current pathological fracture 10/11/2022      COMPARISON:  2022  FINDINGS:  Lumbar spine (L3-L4):               T-score is -1.7, and Z-score is -0.8.  Compared with previous DXA, BMD at the lumbar spine has remained stable.   Femoral neck:                          T-score is -0.9, and Z-score is -0.3.  Total hip:                                T-score is -1.1, and Z-score is -0.6.     Compared with previous DXA, BMD at the total hip     Peanut allergy 02/04/2023    PNA (pneumonia) 05/28/2019    Postoperative hypothyroidism     Prophylactic immunotherapy 05/24/2024    Pulmonary hypertension 02/06/2023    Renovascular hypertension 04/09/2018    S/P arteriovenous (AV) fistula creation 08/14/2019 8/14/19: L2 kyphoplasty with Dr. Kwon       S/P kidney transplant 05/24/2024    S/P kyphoplasty 08/14/2019 8/14/19: L2 kyphoplasty with Dr. Kwon     Slow transit constipation 05/27/2019    Type 2 diabetes mellitus with circulatory  disorder, with long-term current use of insulin 04/09/2018    Type 2 diabetes mellitus with stage 3 chronic kidney disease, without long-term current use of insulin 04/09/2018    Type 2 diabetes mellitus with stage 3a chronic kidney disease, without long-term current use of insulin 03/03/2021    Type 2 diabetes mellitus with stage 3b chronic kidney disease, without long-term current use of insulin 09/07/2022       Past Surgical History:   Procedure Laterality Date    AV FISTULA PLACEMENT Left 10/27/2022    Procedure: CREATION, AV FISTULA;  Surgeon: Tirso Tavera MD;  Location: [facility] OR 2ND FLR;  Service: Peripheral Vascular;  Laterality: Left;  Brachio basilic 1st stage     BACK SURGERY  2017    CHOLECYSTECTOMY  2015    [place]    COLONOSCOPY  11/13/2015    Dr Chino torres prep. internal hemorrhoids. diverticulosis of sigmois. NO polyps. repeat due 2025    FISTULOGRAM N/A 12/16/2022    Procedure: Fistulogram;  Surgeon: Tirso Tavera MD;  Location: [facility] CATH LAB;  Service: Peripheral Vascular;  Laterality: N/A;    FISTULOGRAM, WITH PTA Left 3/23/2023    Procedure: FISTULOGRAM, WITH PTA;  Surgeon: Tirso Tavera MD;  Location: [facility] OR 2ND FLR;  Service: Peripheral Vascular;  Laterality: Left;  LUE TRANSRADIAL CO2 FISTULOGRAM    1.9 min  22.99 mGy  4.3032 Gy.cm  200cc CO2  9ml TransRadial Solution    FISTULOGRAM, WITH PTA Left 8/15/2023    Procedure: FISTULOGRAM, WITH PTA TRANSRADIAL;  Surgeon: Tirso Tavera MD;  Location: [facility] OR [facility] FLR;  Service: Peripheral Vascular;  Laterality: Left;  Fluoro: 5.1 min, mGy: 7.95, Gycm2: 1.2626, contrast: 12ml    FIXATION KYPHOPLASTY Bilateral 08/14/2019    Procedure: L2 kyphoplasty Fluoro Globus;  Surgeon: Jeremie Kwon DO;  Location: [facility] OR;  Service: Neurosurgery;  Laterality: Bilateral;  GLOBUS MACRINA CHUADHARIYIFAN [phone] TEXTED HER @ 10:54AM ON 7-30-19  PRE-OP-BY RN  8-7-2019    HYSTERECTOMY      KIDNEY TRANSPLANT N/A 5/23/2024    Procedure: TRANSPLANT, KIDNEY;   Surgeon: Stepan Bernardo MD;  [facility];  Service: Transplant;  Laterality: N/A;    OOPHORECTOMY  1996    one    PERCUTANEOUS TRANSLUMINAL ANGIOPLASTY OF ARTERIOVENOUS FISTULA Left 12/16/2022    Procedure: PTA, AV FISTULA;  Surgeon: Tirso Tavera MD;  Location: [facility] CATH LAB;  Service: Peripheral Vascular;  Laterality: Left;    PERCUTANEOUS TRANSLUMINAL ANGIOPLASTY OF ARTERIOVENOUS FISTULA Left 11/30/2023    Procedure: PTA, AV FISTULA;  Surgeon: Tirso Tavera MD;  Location: [facility] CATH LAB;  Service: Peripheral Vascular;  Laterality: Left;  left transradial fistulagram    RIGHT HEART CATHETERIZATION Right 6/15/2023    Procedure: INSERTION, CATHETER, RIGHT HEART;  Surgeon: Lucretia Rivas MD;  Location: [facility] CATH LAB;  Service: Cardiology;  Laterality: Right;    THYROIDECTOMY  2016    at [facility] for thyroid nodule    TONSILLECTOMY      TRANSPOSITION OF BASILIC VEIN Left 1/31/2023    Procedure: TRANSPOSITION, VEIN, BASILIC;  Surgeon: Tirso Tavera MD;  [facility];  Service: Peripheral Vascular;  Laterality: Left;       Family History   Problem Relation Name Age of Onset    Stroke Mother      Heart disease Mother      Hypertension Mother      Diabetes type II Mother      Coronary artery disease Mother      Hypertension Father      Prostate cancer Father      Aneurysm Brother         Social History     Socioeconomic History    Marital status:    Tobacco Use    Smoking status: Never     Passive exposure: Past    Smokeless tobacco: Never   Substance and Sexual Activity    Alcohol use: Not Currently    Drug use: No    Sexual activity: Not Currently   Social History Narrative    Pt enjoys cooking and baking and making Floral arrangement and Ker[employer]     Caregiver Daughter Mike     Social Drivers of Health     Financial Resource Strain: Low Risk  (1/1/2025)    Overall Financial Resource Strain (CARDIA)     Difficulty of Paying Living Expenses: Not very hard   Food  Insecurity: Food Insecurity Present (1/1/2025)    Hunger Vital Sign     Worried About Running Out of Food in the Last Year: Never true     Ran Out of Food in the Last Year: Sometimes true   Transportation Needs: No Transportation Needs (8/9/2024)    PRAPARE - Transportation     Lack of Transportation (Medical): No     Lack of Transportation (Non-Medical): No   Physical Activity: Insufficiently Active (1/1/2025)    Exercise Vital Sign     Days of Exercise per Week: 3 days     Minutes of Exercise per Session: 30 min   Stress: No Stress Concern Present (1/1/2025)    [employer] - Occupational Stress Questionnaire     Feeling of Stress : Not at all   Housing Stability: Low Risk  (1/1/2025)    Housing Stability Vital Sign     Unable to Pay for Housing in the Last Year: No     Homeless in the Last Year: No       MEDICATIONS & ALLERGIES:     Current Outpatient Medications on File Prior to Visit   Medication Sig Dispense Refill    bempedoic acid-ezetimibe 180-10 mg Tab Take 1 tablet by mouth once daily. 90 tablet 3    blood sugar diagnostic Strp 1 each by Misc.(Non-Drug; Combo Route) route 3 (three) times daily. 100 each 2    blood-glucose meter Misc Use as instructed 1 each 0    blood-glucose sensor (FREESTYLE LIS 3 SENSOR) Evelyn Change sensor every 15 days. 3 each 11    blood-glucose sensor (FREESTYLE LIS 3 SENSOR) Evelyn 2 each by Misc.(Non-Drug; Combo Route) route every 14 (fourteen) days. 2 each 6    carvediloL (COREG) 6.25 MG tablet Take 1 tablet (6.25 mg total) by mouth 2 (two) times daily. 180 tablet 3    cholecalciferol, vitamin D3, 125 mcg (5,000 unit) Tab Take 1 tablet (5,000 Units total) by mouth once daily. 90 tablet 3    clindamycin (CLEOCIN) 300 MG capsule TAKE 1 CAPSULE BY MOUTH EVERY 6 HOURS      EScitalopram oxalate (LEXAPRO) 10 MG tablet Take 1 tablet (10 mg total) by mouth every evening. 90 tablet 3    lancets Misc 1 each by Misc.(Non-Drug; Combo Route) route 3 (three) times  "daily. 100 each 2    levocetirizine (XYZAL) 5 MG tablet Take 1 tablet (5 mg total) by mouth daily as needed for Allergies. 90 tablet 3    levothyroxine (SYNTHROID) 125 MCG tablet Take 1 tablet (125 mcg total) by mouth before breakfast. 30 tablet 11    methylPREDNISolone (MEDROL DOSEPACK) 4 mg tablet FOLLOW PACKAGE DIRECTIONS      mycophenolate (CELLCEPT) 250 mg Cap Take 3 capsules (750 mg total) by mouth 2 (two) times daily. 180 capsule 11    NIFEdipine (PROCARDIA-XL) 30 MG (OSM) 24 hr tablet Take 1 tablet (30 mg total) by mouth once daily. 30 tablet 5    pen needle, diabetic 32 gauge x 5/32" Ndle use 1 pen needle as directed 3 (three) times daily with meals. 200 each 4    predniSONE (DELTASONE) 5 MG tablet Take by mouth daily; 5/26/2024-6/1/2024: 20 mg, 6/2/2024-6/8/2024: 15 mg; 6/9/2024-6/15/2024: 10 mg; 6/16/2024- forever: 5 mg; do not stop 70 tablet 11    tacrolimus (PROGRAF) 1 MG Cap Take 2 capsules (2 mg total) by mouth once daily AND 2 capsules (2 mg total) every evening. 240 capsule 11    tirzepatide 7.5 mg/0.5 mL PnIj Inject once weekly subcutaneously 4 Pen 3    traZODone (DESYREL) 50 MG tablet Take 1 to 2 tablets ( mg total) by mouth nightly as needed for Insomnia. 120 tablet 3    valACYclovir (VALTREX) 1000 MG tablet Take 1 tablet (1,000 mg total) by mouth every 12 (twelve) hours. for 7 days 14 tablet 0     No current facility-administered medications on file prior to visit.        Review of patient's allergies indicates:   Allergen Reactions    Atorvastatin Other (See Comments)     Statin induced myalgia    Mushroom Hives    Peanut Anaphylaxis    Bactrim [sulfamethoxazole-trimethoprim] Hives    Gabapentin      Pruritis     Iodine     Peanut butter flavor     Penicillins Hives    Shellfish containing products Itching    Sulfa (sulfonamide antibiotics) Hives       Medications Reconciliation:   I have reconciled the patient's home medications and discharge medications with the patient/family. I have " updated all changes.  Refer to After-Visit Medication List.    OBJECTIVE:     Vital Signs:  Vitals:    01/30/25 1524   BP: 114/82   Pulse: 95     Wt Readings from Last 3 Encounters:   01/30/25 1524 97.6 kg (215 lb 2.7 oz)   12/31/24 1509 103.4 kg (228 lb)   11/04/24 0943 106.7 kg (235 lb 3.7 oz)     Body mass index is 31.77 kg/m².     Physical Exam:  General: Well developed, well nourished. No distress.  HEENT: Head is normocephalic, atraumatic  Eyes: Clear conjunctiva.  Neck: Supple, symmetrical neck; trachea midline.  Lungs: Clear to auscultation bilaterally and normal respiratory effort.  Cardiovascular: Heart with regular rate and rhythm.    Extremities: No LE edema.  Abdomen: Abdomen is soft, non-tender non-distended with normal bowel sounds.  Skin: Skin color, texture, turgor normal. No rashes.  Musculoskeletal: Normal gait.   Psychiatric: Normal affect. Alert.    Laboratory  Lab Results   Component Value Date    WBC 8.97 01/25/2025    HGB 13.2 01/25/2025    HCT 42.3 01/25/2025     01/25/2025    CHOL 176 05/02/2024    TRIG 127 05/02/2024    HDL 60 05/02/2024    ALT 9 (L) 12/31/2024    AST 10 12/31/2024     01/25/2025    K 3.6 01/25/2025     01/25/2025    CREATININE 1.5 (H) 01/25/2025    BUN 15 01/25/2025    CO2 23 01/25/2025    TSH 0.018 (L) 10/22/2024    INR 1.1 05/27/2024    HGBA1C 5.5 05/23/2024       ASSESSMENT & PLAN:     Annual physical exam  - Reviewed and updated past and current medical problems.  Discussed treatment of current medical problems    -     Hemoglobin A1C; Future; Expected date: 01/30/2025  -     TSH; Future; Expected date: 01/30/2025    S/P kidney transplant 5/23/2024  Prophylactic immunotherapy  Metabolic bone disease  Long-term use of immunosuppressant medication  Adrenal cortical steroid causing adverse reaction in therapeutic dose    Anemia of chronic disease  On (per KTM)        Prednisone 5 mg daily        Cellcept 500 mg BID         Prograf 2 mg BID     Chronic  diastolic heart failure  Pulmonary HTN  Renovascular hypertension  - Compensated CHF.    Could not get DIgital HTN to work for her.          CHF/HTN Regimen:       Carvedilol 6.25 mg BID     DC: -   Procardia XL 30 mg am since BP becoming to low at home.     Type 2 diabetes mellitus Diabetic Neuropathy and retinopathy  Diabetic neuropathy  - A1c 5.5        Followed by Endocrine.  Off insulin.     On Mounjaro 7.5 mg daily.     Mixed hyperlipidemia  Statin Induced myopathy.  - LDL goal < 100    Last LDL 88 ()     - Statin induced myopathy with atorvastatin.       On:  -     bempdoic acid-ezetimibe daily (per Cardiology)        Vitamin D deficiency  Other Osteoporosis  - Change from weekly to daily Vitamin D3 5000 IU daily OTC.   -     cholecalciferol, vitamin D3, 125 mcg (5,000 unit) Tab; Take 1 tablet (5,000 Units total) by mouth once daily.    Restart Alendronate 70 mg weekly.    Postoperative hypothyroidism  -     SYNTHROID 125 mcg daily. Reduced recently due to low TSH by Endocrine.     Generalized anxiety disorder  -     EScitalopram oxalate (LEXAPRO) 10 MG tablet nightly.  -     traZODone (DESYREL) 50 MG tablet; Take 1 tablet (50 mg total) by mouth nightly as needed for Insomnia.        Factor XI deficiency  - Usually get  FFP 2 units right before surgery for Factor XI deficiency     Class 1 obesity due to excess calories with serious comorbidity  Last  Body mass index is 33.7 kg/m².  Today: Body mass index is 31.77 kg/m².    Preventive Health Maintenance:     Up to date    Return to Clinic for Follow Up with me:  6 months.    Scheduled Follow-up :  Future Appointments   Date Time Provider Department Center   2/3/2025  8:05 AM SPEC LAB, [facility]   2/13/2025  2:30 PM Zoe Montejo DO [facility]   5/9/2025  9:00 AM Martina Grant MD [facility] [address]       After Visit Medication List :     Medication List            Accurate as of January 30, 2025  3:51  PM. If you have any questions, ask your nurse or doctor.                START taking these medications      alendronate 70 MG tablet  Commonly known as: FOSAMAX  Take 1 tablet (70 mg total) by mouth every 7 days.  Started by: Elan Price MD            CONTINUE taking these medications      ACCU-CHEK GUIDE GLUCOSE METER Misc  Generic drug: blood-glucose meter  Use as instructed     ACCU-CHEK GUIDE TEST STRIPS Strp  Generic drug: blood sugar diagnostic  1 each by Misc.(Non-Drug; Combo Route) route 3 (three) times daily.     carvediloL 6.25 MG tablet  Commonly known as: COREG  Take 1 tablet (6.25 mg total) by mouth 2 (two) times daily.     cholecalciferol (vitamin D3) 125 mcg (5,000 unit) Tab  Take 1 tablet (5,000 Units total) by mouth once daily.     EScitalopram oxalate 10 MG tablet  Commonly known as: LEXAPRO  Take 1 tablet (10 mg total) by mouth every evening.     * FREESTYLE LIS 3 SENSOR Evelyn  Generic drug: blood-glucose sensor  Change sensor every 15 days.     * FREESTYLE LIS 3 SENSOR Evelyn  Generic drug: blood-glucose sensor  2 each by Misc.(Non-Drug; Combo Route) route every 14 (fourteen) days.     levothyroxine 125 MCG tablet  Commonly known as: SYNTHROID  Take 1 tablet (125 mcg total) by mouth before breakfast.     MOUNJARO 7.5 mg/0.5 mL Pnij  Generic drug: tirzepatide  Inject once weekly subcutaneously     mycophenolate 250 mg Cap  Commonly known as: CELLCEPT  Take 3 capsules (750 mg total) by mouth 2 (two) times daily.     NEXLIZET 180-10 mg Tab  Generic drug: bempedoic acid-ezetimibe  Take 1 tablet by mouth once daily.     predniSONE 5 MG tablet  Commonly known as: DELTASONE  Take by mouth daily; 5/26/2024-6/1/2024: 20 mg, 6/2/2024-6/8/2024: 15 mg; 6/9/2024-6/15/2024: 10 mg; 6/16/2024- forever: 5 mg; do not stop     tacrolimus 1 MG Cap  Commonly known as: PROGRAF  Take 2 capsules (2 mg total) by mouth once daily AND 2 capsules (2 mg total) every evening.     traZODone 50 MG tablet  Commonly known as:  "DESYREL  Take 1 to 2 tablets ( mg total) by mouth nightly as needed for Insomnia.           * This list has 2 medication(s) that are the same as other medications prescribed for you. Read the directions carefully, and ask your doctor or other care provider to review them with you.                STOP taking these medications      ACCU-CHEK SOFTCLIX LANCETS Misc  Generic drug: lancets  Stopped by: Elan Price MD     BD ULTRA-FINE OLGA LIDIA PEN NEEDLE 32 gauge x 5/32" Ndle  Generic drug: pen needle, diabetic  Stopped by: Elan Price MD     clindamycin 300 MG capsule  Commonly known as: CLEOCIN  Stopped by: Elan Price MD     levocetirizine 5 MG tablet  Commonly known as: XYZAL  Stopped by: Elan Price MD     methylPREDNISolone 4 mg tablet  Commonly known as: MEDROL DOSEPACK  Stopped by: Elan Price MD     NIFEdipine 30 MG (OSM) 24 hr tablet  Commonly known as: PROCARDIA-XL  Stopped by: Elan Price MD     valACYclovir 1000 MG tablet  Commonly known as: VALTREX  Stopped by: Elan Price MD               Where to Get Your Medications        These medications were sent to [facility]  [address] [place] [id number]      Hours: Mon-Fri, 8a-5:30p Phone: [phone]   alendronate 70 MG tablet  traZODone 50 MG tablet         Signing Physician:  Elan Price MD  "

## 2025-02-12 ENCOUNTER — PATIENT MESSAGE (OUTPATIENT)
Dept: TRANSPLANT | Facility: CLINIC | Age: 65
End: 2025-02-12
Payer: MEDICARE

## 2025-02-12 ENCOUNTER — E-VISIT (OUTPATIENT)
Dept: INTERNAL MEDICINE | Facility: CLINIC | Age: 65
End: 2025-02-12
Payer: MEDICARE

## 2025-02-12 DIAGNOSIS — R21 RASH: Primary | ICD-10-CM

## 2025-02-12 PROCEDURE — 99499 UNLISTED E&M SERVICE: CPT | Mod: ,,, | Performed by: NURSE PRACTITIONER

## 2025-02-12 NOTE — PROGRESS NOTES
Patient ID: Kathie Quezada is a 65 y.o. female.    Chief Complaint: General Illness (Entered automatically based on patient selection in [employer].)    The patient initiated a request through [employer] on 2/12/2025 for evaluation and management with a chief complaint of General Illness (Entered automatically based on patient selection in [employer].)     I evaluated the questionnaire submission on 2/12/25.    Ohs Peq Evisit Supergroup-Common Problems    2/12/2025 12:55 PM CST - Filed by Patient   What do you need help with? Rash   Do you agree to participate in an E-Visit? Yes   If you have any of the following symptoms, please present to your local emergency room or call 911:  I acknowledge   What is the main issue you would like addressed today? Rash and itching it started 2/8/2025   How would you describe your skin problem? Lump or bump   When did your symptoms first appear? 2/8/2025   Where is it located?  Arm(s);  Leg(s)   Does it itch? Yes   Does it hurt? No   Is there discharge or drainage? No   Is there bleeding? No   Describe the character Blistered   Describe the color Red   Has it changed over time? Spread to other locations   Frequency of skin problem Fluctuates at random   Duration of the skin problem (how long does it stay when it is present) Hours   I have had a new exposure to No new exposures   I have had a new exposure to No new exposures   What have you used to treat the skin problem? Rubbing alcohol and Christian vapor rub   If you have used anything for treatment, has it helped the symptoms? No   Other generalized symptoms that you associate with the rash No other symptoms   Provide any additional information you feel is important. Feel like my medicine stays in my chest when I take it   At least one photo is required for treatment to be provided. You can upload a maximum of three photos of the affected area.     Are you able to take your vital signs? No         No diagnosis found.     No orders  of the defined types were placed in this encounter.           No follow-ups on file.      E-Visit Time Trackin

## 2025-02-13 ENCOUNTER — HOSPITAL ENCOUNTER (EMERGENCY)
Facility: HOSPITAL | Age: 65
Discharge: HOME | End: 2025-02-13
Attending: EMERGENCY MEDICINE
Payer: MEDICARE

## 2025-02-13 VITALS
RESPIRATION RATE: 20 BRPM | HEART RATE: 99 BPM | SYSTOLIC BLOOD PRESSURE: 132 MMHG | BODY MASS INDEX: 31.55 KG/M2 | DIASTOLIC BLOOD PRESSURE: 60 MMHG | WEIGHT: 213 LBS | OXYGEN SATURATION: 98 % | HEIGHT: 69 IN | TEMPERATURE: 98 F

## 2025-02-13 DIAGNOSIS — R21 RASH: Primary | ICD-10-CM

## 2025-02-13 DIAGNOSIS — L50.9 URTICARIA: ICD-10-CM

## 2025-02-13 LAB
ERYTHROCYTE [DISTWIDTH] IN BLOOD BY AUTOMATED COUNT: 13.7 % (ref 11.5–14.5)
HCT VFR BLD AUTO: 37.9 % (ref 37–48.5)
HGB BLD-MCNC: 11.8 G/DL (ref 12–16)
MCH RBC QN AUTO: 25.8 PG (ref 27–31)
MCHC RBC AUTO-ENTMCNC: 31.1 G/DL (ref 32–36)
MCV RBC AUTO: 83 FL (ref 82–98)
PLATELET # BLD AUTO: 174 K/UL (ref 150–450)
PMV BLD AUTO: 11.7 FL (ref 9.2–12.9)
RBC # BLD AUTO: 4.58 M/UL (ref 4–5.4)
WBC # BLD AUTO: 5.46 K/UL (ref 3.9–12.7)

## 2025-02-13 PROCEDURE — 99284 EMERGENCY DEPT VISIT MOD MDM: CPT | Mod: HCNC

## 2025-02-13 PROCEDURE — 85027 COMPLETE CBC AUTOMATED: CPT | Mod: HCNC | Performed by: EMERGENCY MEDICINE

## 2025-02-13 PROCEDURE — 25000003 PHARM REV CODE 250: Mod: HCNC | Performed by: EMERGENCY MEDICINE

## 2025-02-13 RX ORDER — FAMOTIDINE 10 MG/ML
20 INJECTION INTRAVENOUS
Status: DISCONTINUED | OUTPATIENT
Start: 2025-02-13 | End: 2025-02-13 | Stop reason: HOSPADM

## 2025-02-13 RX ORDER — EPINEPHRINE 0.3 MG/.3ML
1 INJECTION SUBCUTANEOUS ONCE
Qty: 0.3 ML | Refills: 0 | Status: SHIPPED | OUTPATIENT
Start: 2025-02-13 | End: 2025-02-14

## 2025-02-13 RX ORDER — DIPHENHYDRAMINE HCL 25 MG
25 CAPSULE ORAL EVERY 6 HOURS PRN
Qty: 20 CAPSULE | Refills: 0 | Status: SHIPPED | OUTPATIENT
Start: 2025-02-13

## 2025-02-13 RX ORDER — HYDROCORTISONE 25 MG/G
CREAM TOPICAL 2 TIMES DAILY
Qty: 453.6 G | Refills: 0 | Status: SHIPPED | OUTPATIENT
Start: 2025-02-13

## 2025-02-13 RX ORDER — DIPHENHYDRAMINE HCL 25 MG
25 CAPSULE ORAL
Status: COMPLETED | OUTPATIENT
Start: 2025-02-13 | End: 2025-02-13

## 2025-02-13 RX ADMIN — DIPHENHYDRAMINE HYDROCHLORIDE 25 MG: 25 CAPSULE ORAL at 08:02

## 2025-02-14 ENCOUNTER — PATIENT MESSAGE (OUTPATIENT)
Dept: TRANSPLANT | Facility: CLINIC | Age: 65
End: 2025-02-14
Payer: MEDICARE

## 2025-02-14 DIAGNOSIS — R21 RASH: Primary | ICD-10-CM

## 2025-02-14 NOTE — HPI
Ms. 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 She has a history of CVA in 2017 without any deficit. She also has factor XII deficiency, history of hep C (s/p treatment with harvoni. Seen by hepatology with fibroscan performed showing F0-F1), and pHTN (RHC performed on 6/7/23 and found to have group 2 HTN. TTE and cardiac PET with preserved EF)     Procedure(s) (LRB):  TRANSPLANT, KIDNEY (N/A)      Hospital Course:    She is now s/p living related kidney transplant on 5/23/24. Surgery without complication. 2 arteries reconstructed side-to-side. 2 day MARIXA mera x 1. She progressed well post operatively. She is making large amount of urine with renal clearance. Creatinine 1.2 at time of discharge. She is tolerating diet, passing flatus and having bowel movements. Incisional pain well controlled.      Due to factor XII deficiency she received 1 unit of FFP in OR and 1 unit of FFP on POD#1. She received standard DVT prophylaxis while inpatient and was transitioned to prophylaxis lovenox x 2 weeks on discharge.

## 2025-02-14 NOTE — ED TRIAGE NOTES
Pt arrives to ED with complaints of a rash on the inside of both of her thighs. She states the rash started Saturday and that it gets worse when she's takes her daily medicine. She is a kidney transplant pt and thinks it could be the medicine . She endores shortness of breath and chest pain that comes and goes.

## 2025-02-14 NOTE — ED PROVIDER NOTES
Encounter Date: 2/13/2025       History     Chief Complaint   Patient presents with    Rash     Hx of kidney tx 8 months ago. States that she noticed a rash on her legs 5 days ago. States that her provider is concerned that she is having a reaction to her prograf. She states she has been experiencing itching after taking it but never a rash in the past.     Kathie Quezada is a 66yo female with a hx of renal transplant 5/23/2024 (on prednisone, prograf, and Cellcept), CHF, and hypothyroidism who presented for evaluation of a rash that started a few days ago on her left inner thigh and has since spread to right inner thigh, right upper arm, left breast, and base of the skull with profuse itching. Her outpatient transplant physician referred her in thinking it may be due to a recent increase in prograf doing.  The rash is clustered urticaria with no blisters or comedones. She denies any fevers, trouble breathing, or throat swelling. She has an extensive history of allergens including seafood, peanuts, mushrooms, and multiple antibiotics. She was at a seafood boil this weekend, however she maintained a good distance from the food. She denies any exposures to nature, bugs, or other known allergens. She has not had a rash like this before. She gets relief from vicks salve         Review of patient's allergies indicates:   Allergen Reactions    Atorvastatin Other (See Comments)     Statin induced myalgia    Mushroom Hives    Peanut Anaphylaxis    Bactrim [sulfamethoxazole-trimethoprim] Hives    Gabapentin      Pruritis     Iodine     Peanut butter flavor     Penicillins Hives    Shellfish containing products Itching    Sulfa (sulfonamide antibiotics) Hives     Past Medical History:   Diagnosis Date    Acute respiratory failure with hypoxia and hypercapnia 05/26/2019    ATN (acute tubular necrosis) 02/08/2019    Chronic diastolic heart failure 02/07/2019    2-8-19 Mild left atrial enlargement. Mild left ventricular  enlargement. Normal left ventricular systolic function. The estimated ejection fraction is 55% Indeterminate left ventricular diastolic function. Normal right ventricular systolic function. Mild mitral regurgitation. Mild to moderate tricuspid regurgitation. The estimated PA systolic pressure is 32 mm Hg Normal central venous pressure (3 m    CKD (chronic kidney disease) stage 4, GFR 15-29 ml/min 05/16/2018    CKD stage G3a/A3, GFR 45-59 and albumin creatinine ratio >300 mg/g 05/16/2018    CKD stage G3b/A3, GFR 30-44 and albumin creatinine ratio >300 mg/g 05/16/2018    Closed compression fracture of L2 lumbar vertebra 05/24/2019    Closed compression fracture of L2 lumbar vertebra 05/24/2019     IMO Regulatory Load October 2019    Clotted dialysis access 07/22/2023    Controlled type 2 diabetes with retinopathy 04/09/2018    COVID-19 virus infection 7-1-2021 07/01/2021    CVA (cerebral vascular accident) 2017    Diabetic polyneuropathy associated with type 2 diabetes mellitus 12/04/2020    Encounter for blood transfusion     Essential hypertension 04/09/2018    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  7-31-19 Factor XI Activity 55 - 145 % 96       Gastroesophageal reflux disease without esophagitis 05/24/2019    Generalized anxiety disorder 11/08/2019    GERD (gastroesophageal reflux disease)     GIB (gastrointestinal bleeding) 10/18/2018    History of CVA (cerebrovascular accident) 11/27/2022    History of CVA (cerebrovascular accident) without residual deficits 11/27/2022    History of hepatitis C     s/p succesful Rx w/ Harvoni  - SVR12 (cure) 2016    Hypertensive retinopathy, bilateral 12/10/2019    Mild nonproliferative diabetic retinopathy 05/02/2018    Mixed hyperlipidemia 04/09/2018    Myalgia due to statin 09/07/2022    Nephrotic range proteinuria 04/09/2018    Normocytic anemia 01/14/2022    NS (nuclear sclerosis), bilateral 12/10/2019    Other osteoporosis without current  pathological fracture 10/11/2022      COMPARISON:  2022  FINDINGS:  Lumbar spine (L3-L4):               T-score is -1.7, and Z-score is -0.8.  Compared with previous DXA, BMD at the lumbar spine has remained stable.   Femoral neck:                          T-score is -0.9, and Z-score is -0.3.  Total hip:                                T-score is -1.1, and Z-score is -0.6.     Compared with previous DXA, BMD at the total hip     Peanut allergy 02/04/2023    PNA (pneumonia) 05/28/2019    Postoperative hypothyroidism     Prophylactic immunotherapy 05/24/2024    Pulmonary hypertension 02/06/2023    Renovascular hypertension 04/09/2018    S/P arteriovenous (AV) fistula creation 08/14/2019 8/14/19: L2 kyphoplasty with Dr. Kwon       S/P kidney transplant 05/24/2024    S/P kyphoplasty 08/14/2019 8/14/19: L2 kyphoplasty with Dr. Kwon     Slow transit constipation 05/27/2019    Type 2 diabetes mellitus with circulatory disorder, with long-term current use of insulin 04/09/2018    Type 2 diabetes mellitus with stage 3 chronic kidney disease, without long-term current use of insulin 04/09/2018    Type 2 diabetes mellitus with stage 3a chronic kidney disease, without long-term current use of insulin 03/03/2021    Type 2 diabetes mellitus with stage 3b chronic kidney disease, without long-term current use of insulin 09/07/2022     Past Surgical History:   Procedure Laterality Date    AV FISTULA PLACEMENT Left 10/27/2022    Procedure: CREATION, AV FISTULA;  Surgeon: Tirso Tavera MD;  Location: [facility] OR [address] [place] [address];  Service: Peripheral Vascular;  Laterality: Left;  Brachio basilic 1st stage     BACK SURGERY  2017    CHOLECYSTECTOMY  2015    Touro    COLONOSCOPY  11/13/2015    Dr Chino torres prep. internal hemorrhoids. diverticulosis of sigmois. NO polyps. repeat due 2025    FISTULOGRAM N/A 12/16/2022    Procedure: Fistulogram;  Surgeon: Tirso Tavera MD;  Location: [facility] CATH LAB;  Service: Peripheral  Vascular;  Laterality: N/A;    FISTULOGRAM, WITH PTA Left 3/23/2023    Procedure: FISTULOGRAM, WITH PTA;  Surgeon: Tirso Tavera MD;  [facility];  Service: Peripheral Vascular;  Laterality: Left;  LUE TRANSRADIAL CO2 FISTULOGRAM    1.9 min  22.99 mGy  4.3032 Gy.cm  200cc CO2  9ml TransRadial Solution    FISTULOGRAM, WITH PTA Left 8/15/2023    Procedure: FISTULOGRAM, WITH PTA TRANSRADIAL;  Surgeon: Tirso Tavera MD;  [facility];  Service: Peripheral Vascular;  Laterality: Left;  Fluoro: 5.1 min, mGy: 7.95, Gycm2: 1.2626, contrast: 12ml    FIXATION KYPHOPLASTY Bilateral 08/14/2019    Procedure: L2 kyphoplasty Fluoro Globus;  Surgeon: Jeremie Kwon DO;  Location: [facility];  Service: Neurosurgery;  Laterality: Bilateral;  GLOBUS MACRINA WINSLOW [phone] TEXTED HER @ 10:54AM ON 7-30-19  PRE-OP-BY RN  8-7-2019    HYSTERECTOMY      KIDNEY TRANSPLANT N/A 5/23/2024    Procedure: TRANSPLANT, KIDNEY;  Surgeon: Stepan Bernardo MD;  [address]  Service: Transplant;  Laterality: N/A;    OOPHORECTOMY  1996    one    PERCUTANEOUS TRANSLUMINAL ANGIOPLASTY OF ARTERIOVENOUS FISTULA Left 12/16/2022    Procedure: PTA, AV FISTULA;  Surgeon: Tirso Tavera MD;  Location: [facility] CATH LAB;  Service: Peripheral Vascular;  Laterality: Left;    PERCUTANEOUS TRANSLUMINAL ANGIOPLASTY OF ARTERIOVENOUS FISTULA Left 11/30/2023    Procedure: PTA, AV FISTULA;  Surgeon: Tirso Tavera MD;  Location: [facility] CATH LAB;  Service: Peripheral Vascular;  Laterality: Left;  left transradial fistulagram    RIGHT HEART CATHETERIZATION Right 6/15/2023    Procedure: INSERTION, CATHETER, RIGHT HEART;  Surgeon: Lucretia Rivas MD;  Location: [facility] CATH LAB;  Service: Cardiology;  Laterality: Right;    THYROIDECTOMY  2016    at [facility] for [facility] nodule    TONSILLECTOMY      TRANSPOSITION OF BASILIC VEIN Left 1/31/2023    Procedure: TRANSPOSITION, VEIN, BASILIC;  Surgeon: Tirso Tavera MD;  [facility];  Service: Peripheral Vascular;  Laterality: Left;     Family History   Problem Relation Name Age of Onset    Stroke Mother      Heart disease Mother      Hypertension Mother      Diabetes type II Mother      Coronary artery disease Mother      Hypertension Father      Prostate cancer Father      Aneurysm Brother       Social History     Tobacco Use    Smoking status: Never     Passive exposure: Past    Smokeless tobacco: Never   Substance Use Topics    Alcohol use: Not Currently    Drug use: No     Review of Systems   Constitutional:  Positive for chills. Negative for fever.   Respiratory:  Negative for cough, choking, chest tightness, shortness of breath and wheezing.    Cardiovascular:  Positive for leg swelling (at site of rash). Negative for chest pain.   Gastrointestinal:  Negative for abdominal pain.   Genitourinary:  Negative for dysuria.   Skin:  Positive for rash.   Neurological:  Negative for seizures, syncope and weakness.   Psychiatric/Behavioral:  Negative for confusion.        Physical Exam     Initial Vitals [02/13/25 1806]   BP Pulse Resp Temp SpO2   132/60 99 20 97.7 °F (36.5 °C) 98 %      MAP       --         Physical Exam    Constitutional: She appears well-developed and well-nourished. She is not diaphoretic. No distress.   HENT:   Head: Normocephalic and atraumatic. Mouth/Throat: Oropharynx is clear and moist. No oropharyngeal exudate.   Eyes: Conjunctivae and EOM are normal.   Cardiovascular:  Normal rate, regular rhythm and normal heart sounds.           Pulmonary/Chest: Breath sounds normal. No respiratory distress. She has no wheezes.   Abdominal: Abdomen is soft. She exhibits no distension. There is no abdominal tenderness.   Musculoskeletal:         General: No edema.     Neurological: She is alert and oriented to person, place, and time.   Skin: Skin is warm. Rash noted. Rash is urticarial.              ED Course   Procedures  Labs Reviewed   CBC WITHOUT DIFFERENTIAL   COMPREHENSIVE  METABOLIC PANEL          Imaging Results    None          Medications   diphenhydrAMINE capsule 25 mg (has no administration in time range)   famotidine (PF) injection 20 mg (has no administration in time range)     Medical Decision Making  65yF presenting with urticarial rash over multiple areas of her body. Outpatient transplant physician concerned for reaction to recently increased prograf. KTM consulted who recommend hydrocortisone cream and benadryl with dermatology follow up.    Rash may also be due to allergen exposure, either known possible seafood exposure or a new allergen which is possible given extensive allergen history. Treated with benadryl. She was also given an epi pen prescription given her history of anaphylaxis.    At time of handoff to night team she is still pending labs. Rash stable. VSS.    Amount and/or Complexity of Data Reviewed  Labs: ordered.    Risk  OTC drugs.  Prescription drug management.                                  Clinical Impression:  Final diagnoses:  [R21] Rash (Primary)  [L50.9] Urticaria         Gloria Acharya MD  [facility]  02/13/2025 7:48 PM           Gloria Acharya MD  Resident  02/13/25 2044

## 2025-02-15 DIAGNOSIS — E11.3553 CONTROLLED TYPE 2 DIABETES MELLITUS WITH STABLE PROLIFERATIVE RETINOPATHY OF BOTH EYES, UNSPECIFIED WHETHER LONG TERM INSULIN USE: Chronic | ICD-10-CM

## 2025-02-17 ENCOUNTER — OFFICE VISIT (OUTPATIENT)
Dept: INFECTIOUS DISEASES | Facility: CLINIC | Age: 65
End: 2025-02-17
Payer: MEDICARE

## 2025-02-17 ENCOUNTER — LAB VISIT (OUTPATIENT)
Dept: LAB | Facility: HOSPITAL | Age: 65
End: 2025-02-17
Attending: INTERNAL MEDICINE
Payer: MEDICARE

## 2025-02-17 VITALS
WEIGHT: 230.38 LBS | DIASTOLIC BLOOD PRESSURE: 81 MMHG | BODY MASS INDEX: 34.12 KG/M2 | TEMPERATURE: 98 F | HEIGHT: 69 IN | HEART RATE: 92 BPM | SYSTOLIC BLOOD PRESSURE: 119 MMHG

## 2025-02-17 DIAGNOSIS — N76.6 GENITAL ULCER, FEMALE: ICD-10-CM

## 2025-02-17 DIAGNOSIS — N76.6 GENITAL ULCER, FEMALE: Primary | ICD-10-CM

## 2025-02-17 LAB — TREPONEMA PALLIDUM IGG+IGM AB [PRESENCE] IN SERUM OR PLASMA BY IMMUNOASSAY: REACTIVE

## 2025-02-17 PROCEDURE — 86592 SYPHILIS TEST NON-TREP QUAL: CPT | Mod: HCNC | Performed by: INTERNAL MEDICINE

## 2025-02-17 PROCEDURE — 86592 SYPHILIS TEST NON-TREP QUAL: CPT | Mod: 91,HCNC | Performed by: INTERNAL MEDICINE

## 2025-02-17 PROCEDURE — 86593 SYPHILIS TEST NON-TREP QUANT: CPT | Mod: HCNC | Performed by: INTERNAL MEDICINE

## 2025-02-17 PROCEDURE — 36415 COLL VENOUS BLD VENIPUNCTURE: CPT | Mod: HCNC | Performed by: INTERNAL MEDICINE

## 2025-02-17 PROCEDURE — 86780 TREPONEMA PALLIDUM: CPT | Mod: HCNC | Performed by: INTERNAL MEDICINE

## 2025-02-17 RX ORDER — BLOOD-GLUCOSE SENSOR
2 EACH MISCELLANEOUS
Qty: 2 EACH | Refills: 6 | Status: SHIPPED | OUTPATIENT
Start: 2025-02-17 | End: 2026-02-17

## 2025-02-17 NOTE — PROGRESS NOTES
Subjective:     Patient ID: Kathie Quezada is a 65 y.o. female    Chief Complaint: positive syphilis test    HPI: 65F s/p Ktx 2024 who was recently seen in the ER for chest pain, also noted to have a vaginal abrasion at the time of visit, STI testing was sent. She was noted to have a positive treponemal ab, nonreactive RPR. Pt was given 30d prescription for doxycycline, but states that it made her itch so she did not take it. She endorses complete resolution of vaginal lesion. States she has never been treated for syphilis in the past. Has not been sexually active for many years. No other symptoms. States she has allergy to penicillin, documented in chart as hives, but pt states she recalls being itchy.       Immunization History   Administered Date(s) Administered    COVID-19 MRNA, LN-S PF (MODERNA HALF 0.25 ML DOSE) 11/19/2021    COVID-19, mRNA, LNP-S, PF (Moderna) Ages 12+ 02/02/2024    COVID-19, mRNA, LNP-S, bivalent booster, PF (PFIZER OMICRON) 09/19/2022    COVID-19, vector-nr, rS-Ad26, PF ([employer]) 03/05/2021    Influenza - Quadrivalent - PF *Preferred* (6 months and older) 01/31/2019, 11/08/2019, 11/03/2020, 11/19/2021, 09/19/2022, 09/23/2023    Influenza - Trivalent - Fluad - Adjuvanted - PF (65 years and older 09/24/2024    PPD Test 05/25/2019, 02/07/2023    Pneumococcal Conjugate - 13 Valent 11/08/2019    Pneumococcal Conjugate - 20 Valent 09/24/2024    Pneumococcal Polysaccharide - 23 Valent 10/18/2018    RSVpreF (Arexvy) 02/02/2024    Tdap 12/16/2017, 10/18/2018    Zoster Recombinant 01/08/2020, 06/23/2020        Review of Systems   All other systems reviewed and are negative.       Past Medical History:   Diagnosis Date    Acute respiratory failure with hypoxia and hypercapnia 05/26/2019    ATN (acute tubular necrosis) 02/08/2019    Chronic diastolic heart failure 02/07/2019    2-8-19 Mild left atrial enlargement. Mild left ventricular enlargement. Normal left ventricular systolic function. The  estimated ejection fraction is 55% Indeterminate left ventricular diastolic function. Normal right ventricular systolic function. Mild mitral regurgitation. Mild to moderate tricuspid regurgitation. The estimated PA systolic pressure is 32 mm Hg Normal central venous pressure (3 m    CKD (chronic kidney disease) stage 4, GFR 15-29 ml/min 05/16/2018    CKD stage G3a/A3, GFR 45-59 and albumin creatinine ratio >300 mg/g 05/16/2018    CKD stage G3b/A3, GFR 30-44 and albumin creatinine ratio >300 mg/g 05/16/2018    Closed compression fracture of L2 lumbar vertebra 05/24/2019    Closed compression fracture of L2 lumbar vertebra 05/24/2019     IMO Regulatory Load October 2019    Clotted dialysis access 07/22/2023    Controlled type 2 diabetes with retinopathy 04/09/2018    COVID-19 virus infection 7-1-2021 07/01/2021    CVA (cerebral vascular accident) 2017    Diabetic polyneuropathy associated with type 2 diabetes mellitus 12/04/2020    Encounter for blood transfusion     Essential hypertension 04/09/2018    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  7-31-19 Factor XI Activity 55 - 145 % 96       Gastroesophageal reflux disease without esophagitis 05/24/2019    Generalized anxiety disorder 11/08/2019    GERD (gastroesophageal reflux disease)     GIB (gastrointestinal bleeding) 10/18/2018    History of CVA (cerebrovascular accident) 11/27/2022    History of CVA (cerebrovascular accident) without residual deficits 11/27/2022    History of hepatitis C     s/p succesful Rx w/ Harvoni  - SVR12 (cure) 2016    Hypertensive retinopathy, bilateral 12/10/2019    Mild nonproliferative diabetic retinopathy 05/02/2018    Mixed hyperlipidemia 04/09/2018    Myalgia due to statin 09/07/2022    Nephrotic range proteinuria 04/09/2018    Normocytic anemia 01/14/2022    NS (nuclear sclerosis), bilateral 12/10/2019    Other osteoporosis without current pathological fracture 10/11/2022      COMPARISON:  2022   FINDINGS:  Lumbar spine (L3-L4):               T-score is -1.7, and Z-score is -0.8.  Compared with previous DXA, BMD at the lumbar spine has remained stable.   Femoral neck:                          T-score is -0.9, and Z-score is -0.3.  Total hip:                                T-score is -1.1, and Z-score is -0.6.     Compared with previous DXA, BMD at the total hip     Peanut allergy 02/04/2023    PNA (pneumonia) 05/28/2019    Postoperative hypothyroidism     Prophylactic immunotherapy 05/24/2024    Pulmonary hypertension 02/06/2023    Renovascular hypertension 04/09/2018    S/P arteriovenous (AV) fistula creation 08/14/2019 8/14/19: L2 kyphoplasty with Dr. Kwon       S/P kidney transplant 05/24/2024    S/P kyphoplasty 08/14/2019 8/14/19: L2 kyphoplasty with Dr. Kwon     Slow transit constipation 05/27/2019    Type 2 diabetes mellitus with circulatory disorder, with long-term current use of insulin 04/09/2018    Type 2 diabetes mellitus with stage 3 chronic kidney disease, without long-term current use of insulin 04/09/2018    Type 2 diabetes mellitus with stage 3a chronic kidney disease, without long-term current use of insulin 03/03/2021    Type 2 diabetes mellitus with stage 3b chronic kidney disease, without long-term current use of insulin 09/07/2022     Past Surgical History:   Procedure Laterality Date    AV FISTULA PLACEMENT Left 10/27/2022    Procedure: CREATION, AV FISTULA;  Surgeon: Tirso Tavera MD;  Location: [facility] OR [address] [place] [address];  Service: Peripheral Vascular;  Laterality: Left;  Brachio basilic 1st stage     BACK SURGERY  2017    CHOLECYSTECTOMY  2015    Touro    COLONOSCOPY  11/13/2015    Dr Chino torres prep. internal hemorrhoids. diverticulosis of sigmois. NO polyps. repeat due 2025    FISTULOGRAM N/A 12/16/2022    Procedure: Fistulogram;  Surgeon: Tirso Tavera MD;  Location: [facility] CATH LAB;  Service: Peripheral Vascular;  Laterality: N/A;    FISTULOGRAM, WITH PTA Left 3/23/2023     Procedure: FISTULOGRAM, WITH PTA;  Surgeon: Tirso Tavera MD;  Location: [facility] OR [facility];  Service: Peripheral Vascular;  Laterality: Left;  LUE TRANSRADIAL CO2 FISTULOGRAM    1.9 min  22.99 mGy  4.3032 Gy.cm  200cc CO2  9ml TransRadial Solution    FISTULOGRAM, WITH PTA Left 8/15/2023    Procedure: FISTULOGRAM, WITH PTA TRANSRADIAL;  Surgeon: Tirso Tavera MD;  [facility];  Service: Peripheral Vascular;  Laterality: Left;  Fluoro: 5.1 min, mGy: 7.95, Gycm2: 1.2626, contrast: 12ml    FIXATION KYPHOPLASTY Bilateral 08/14/2019    Procedure: L2 kyphoplasty Fluoro Globus;  Surgeon: Jeremie Kwon DO;  Location: [facility] OR;  Service: Neurosurgery;  Laterality: Bilateral;  GLOBUS MACRINA WINSLOW [phone] TEXTED HER @ 10:54AM ON 7-30-19  PRE-OP-BY RN  8-7-2019    HYSTERECTOMY      KIDNEY TRANSPLANT N/A 5/23/2024    Procedure: TRANSPLANT, KIDNEY;  Surgeon: Stepan Bernardo MD;  [facility];  Service: Transplant;  Laterality: N/A;    OOPHORECTOMY  1996    one    PERCUTANEOUS TRANSLUMINAL ANGIOPLASTY OF ARTERIOVENOUS FISTULA Left 12/16/2022    Procedure: PTA, AV FISTULA;  Surgeon: Tirso Tavera MD;  Location: [facility] CATH LAB;  Service: Peripheral Vascular;  Laterality: Left;    PERCUTANEOUS TRANSLUMINAL ANGIOPLASTY OF ARTERIOVENOUS FISTULA Left 11/30/2023    Procedure: PTA, AV FISTULA;  Surgeon: Tirso Tavera MD;  Location: [facility] CATH LAB;  Service: Peripheral Vascular;  Laterality: Left;  left transradial fistulagram    RIGHT HEART CATHETERIZATION Right 6/15/2023    Procedure: INSERTION, CATHETER, RIGHT HEART;  Surgeon: Lucretia Rivas MD;  Location: [facility] CATH LAB;  Service: Cardiology;  Laterality: Right;    THYROIDECTOMY  2016    at [facility] for thyroid nodule    TONSILLECTOMY      TRANSPOSITION OF BASILIC VEIN Left 1/31/2023    Procedure: TRANSPOSITION, VEIN, BASILIC;  Surgeon: Tirso Tavera MD;  [facility];  Service: Peripheral Vascular;  Laterality: Left;     Family  History   Problem Relation Name Age of Onset    Stroke Mother      Heart disease Mother      Hypertension Mother      Diabetes type II Mother      Coronary artery disease Mother      Hypertension Father      Prostate cancer Father      Aneurysm Brother       Social History[1]    Objective:     Physical Exam  Constitutional:       General: She is not in acute distress.     Appearance: Normal appearance. She is well-developed. She is not ill-appearing or diaphoretic.   HENT:      Head: Normocephalic and atraumatic.      Right Ear: External ear normal.      Left Ear: External ear normal.      Nose: Nose normal.   Eyes:      General: No scleral icterus.        Right eye: No discharge.         Left eye: No discharge.      Extraocular Movements: Extraocular movements intact.      Conjunctiva/sclera: Conjunctivae normal.   Pulmonary:      Effort: Pulmonary effort is normal. No respiratory distress.      Breath sounds: No stridor.   Musculoskeletal:         General: Normal range of motion.   Skin:     Findings: No erythema or rash.   Neurological:      General: No focal deficit present.      Mental Status: She is alert and oriented to person, place, and time. Mental status is at baseline.      Cranial Nerves: No cranial nerve deficit.   Psychiatric:         Mood and Affect: Mood normal.         Behavior: Behavior normal.         Thought Content: Thought content normal.         Judgment: Judgment normal.         Data:    All data, including recent labs, radiology, and pathology, has been independently reviewed.    Labs:    Recent Labs   Lab Result Units 12/31/24  1631 12/31/24  2206 01/25/25  0820 02/03/25  0730 02/13/25  2049   WBC K/uL 9.83  --  8.97 5.56 5.46   Hemoglobin g/dL 11.7*  --  13.2 12.3 11.8*   Hematocrit % 36.8*  --  42.3 38.9 37.9   Sodium mmol/L 138  --  141 141  --    Potassium mmol/L 3.6  --  3.6 3.7  --    Chloride mmol/L 105  --  105 106  --    BUN mg/dL 10  --  15 9  --    Creatinine mg/dL 1.1  --  1.5*  1.3  --    AST U/L 10  --   --   --   --    ALT U/L 9*  --   --   --   --    Alkaline Phosphatase U/L 95  --   --   --   --    Total Bilirubin mg/dL 0.7  --   --   --   --    HIV 1/2 Ag/Ab   --  Non-reactive  --   --   --         Radiology:    No results found in the last 24 hours.     Assessment:    1. Genital ulcer, female  Pt requested repeat testing to confirm positive antibody, which remains positive  She was given option of seeing allergy to confirm PCN allergy, vs trialing doxycycline again. She would like to see allergy to have skin testing done    If allergy testing is negative, she will proceed w/ 3 doses of bicillin      Treponema Pallidium Antibodies IgG, IgM               Follow up after allergy testing is done    The total time for evaluation and management services performed on 2/17/25 was greater than 30 minutes.     Marti Montejo DO  Transplant Infectious Disease         [1]   Social History  Tobacco Use    Smoking status: Never     Passive exposure: Past    Smokeless tobacco: Never   Substance Use Topics    Alcohol use: Not Currently    Drug use: No

## 2025-02-18 ENCOUNTER — PATIENT MESSAGE (OUTPATIENT)
Dept: INFECTIOUS DISEASES | Facility: CLINIC | Age: 65
End: 2025-02-18
Payer: MEDICARE

## 2025-02-18 DIAGNOSIS — A52.8 LATE LATENT SYPHILIS: Primary | ICD-10-CM

## 2025-02-18 LAB — RPR SER QL: NORMAL

## 2025-02-18 NOTE — PROGRESS NOTES
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

## 2025-02-20 LAB — T PALLIDUM AB SER QL IF: NORMAL

## 2025-02-25 ENCOUNTER — RESULTS FOLLOW-UP (OUTPATIENT)
Dept: TRANSPLANT | Facility: CLINIC | Age: 65
End: 2025-02-25

## 2025-03-11 NOTE — PROGRESS NOTES
Kidney Post-Transplant Assessment    Referring Physician: Fred Kelly  Current Nephrologist: Fred Kelly    ORGAN: LEFT KIDNEY  Donor Type: living  PHS Increased Risk: no  Cold Ischemia: 60 mins  Induction Medications:     Subjective:     CC:  Reassessment of renal allograft function and management of chronic immunosuppression.    HPI:  Ms. Quezada is a 65 y.o. year old Black or  female with history of ESRD secondary to DM2 who received a living kidney transplant on 5/23/24. She has a history of CVA in 2017 without any deficit. She also has factor XII deficiency, history of hep C (s/p treatment with harvoni), and pHTN (RHC performed on 6/7/23 and found to have group 2 HTN. TTE and cardiac PET with preserved EF).  Her most recent creatinine is 1.4. She takes mycophenolate mofetil, prednisone, and tacrolimus for maintenance immunosuppression. Her post transplant course has been uncomplicated to date.  -Due to factor XII deficiency she received 1 unit of FFP in OR and 1 unit of FFP on POD#1. She received standard DVT prophylaxis while inpatient and was transitioned to prophylaxis lovenox x 2 weeks on discharge.     Interval HX:   Reports doing well. Dull pain noted over the kidney started 2 days ago. Denies trouble urinating. No fever or chills.     Reports adequate Intake  UOP-no problems reported   Peripheral edema-no  Appetite-denies N/V/D; tolerating IS meds well   Reports being more active. Cooking for the whole family. Feeling stronger but still not 100%  BP  BP Readings from Last 3 Encounters:   02/17/25 119/81   02/13/25 132/60   01/30/25 114/82   Home BPs 130/80 high    Lab /diagnostic results reviewed with patient today.   All questions answered       Past Medical History:   Diagnosis Date    Acute respiratory failure with hypoxia and hypercapnia 05/26/2019    ATN (acute tubular necrosis) 02/08/2019    Chronic diastolic heart failure 02/07/2019    2-8-19 Mild left atrial  enlargement. Mild left ventricular enlargement. Normal left ventricular systolic function. The estimated ejection fraction is 55% Indeterminate left ventricular diastolic function. Normal right ventricular systolic function. Mild mitral regurgitation. Mild to moderate tricuspid regurgitation. The estimated PA systolic pressure is 32 mm Hg Normal central venous pressure (3 m    CKD (chronic kidney disease) stage 4, GFR 15-29 ml/min 05/16/2018    CKD stage G3a/A3, GFR 45-59 and albumin creatinine ratio >300 mg/g 05/16/2018    CKD stage G3b/A3, GFR 30-44 and albumin creatinine ratio >300 mg/g 05/16/2018    Closed compression fracture of L2 lumbar vertebra 05/24/2019    Closed compression fracture of L2 lumbar vertebra 05/24/2019     IMO Regulatory Load October 2019    Clotted dialysis access 07/22/2023    Controlled type 2 diabetes with retinopathy 04/09/2018    COVID-19 virus infection 7-1-2021 07/01/2021    CVA (cerebral vascular accident) 2017    Diabetic polyneuropathy associated with type 2 diabetes mellitus 12/04/2020    Encounter for blood transfusion     Essential hypertension 04/09/2018    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  7-31-19 Factor XI Activity 55 - 145 % 96       Gastroesophageal reflux disease without esophagitis 05/24/2019    Generalized anxiety disorder 11/08/2019    GERD (gastroesophageal reflux disease)     GIB (gastrointestinal bleeding) 10/18/2018    History of CVA (cerebrovascular accident) 11/27/2022    History of CVA (cerebrovascular accident) without residual deficits 11/27/2022    History of hepatitis C     s/p succesful Rx w/ Harvoni  - SVR12 (cure) 2016    Hypertensive retinopathy, bilateral 12/10/2019    Mild nonproliferative diabetic retinopathy 05/02/2018    Mixed hyperlipidemia 04/09/2018    Myalgia due to statin 09/07/2022    Nephrotic range proteinuria 04/09/2018    Normocytic anemia 01/14/2022    NS (nuclear sclerosis), bilateral 12/10/2019    Other  osteoporosis without current pathological fracture 10/11/2022      COMPARISON:  2022  FINDINGS:  Lumbar spine (L3-L4):               T-score is -1.7, and Z-score is -0.8.  Compared with previous DXA, BMD at the lumbar spine has remained stable.   Femoral neck:                          T-score is -0.9, and Z-score is -0.3.  Total hip:                                T-score is -1.1, and Z-score is -0.6.     Compared with previous DXA, BMD at the total hip     Peanut allergy 02/04/2023    PNA (pneumonia) 05/28/2019    Postoperative hypothyroidism     Prophylactic immunotherapy 05/24/2024    Pulmonary hypertension 02/06/2023    Renovascular hypertension 04/09/2018    S/P arteriovenous (AV) fistula creation 08/14/2019 8/14/19: L2 kyphoplasty with Dr. Kwon       S/P kidney transplant 05/24/2024    S/P kyphoplasty 08/14/2019 8/14/19: L2 kyphoplasty with Dr. Kwon     Slow transit constipation 05/27/2019    Type 2 diabetes mellitus with circulatory disorder, with long-term current use of insulin 04/09/2018    Type 2 diabetes mellitus with stage 3 chronic kidney disease, without long-term current use of insulin 04/09/2018    Type 2 diabetes mellitus with stage 3a chronic kidney disease, without long-term current use of insulin 03/03/2021    Type 2 diabetes mellitus with stage 3b chronic kidney disease, without long-term current use of insulin 09/07/2022       Review of Systems   Constitutional:  Negative for activity change and fever.   Eyes:  Positive for visual disturbance.        Wears glasses   Respiratory:  Negative for shortness of breath.    Cardiovascular:  Negative for chest pain and leg swelling.   Gastrointestinal:  Negative for constipation, diarrhea and nausea.   Genitourinary:  Negative for difficulty urinating, frequency and hematuria.   Musculoskeletal:  Negative for arthralgias and myalgias.   Allergic/Immunologic: Positive for immunocompromised state.   Neurological:  Negative for weakness and  "numbness.   Psychiatric/Behavioral:  Negative for sleep disturbance. The patient is not nervous/anxious.        Objective:   Blood pressure (!) 145/80, pulse 99, temperature 97.5 °F (36.4 °C), temperature source Oral, resp. rate 18, height 5' 10" (1.778 m), weight 105.5 kg (232 lb 9.4 oz), SpO2 98%.body mass index is 33.37 kg/m².    Physical Exam  Constitutional:       General: She is not in acute distress.     Appearance: She is well-developed. She is obese. She is not diaphoretic.   Cardiovascular:      Rate and Rhythm: Normal rate and regular rhythm.      Heart sounds: Normal heart sounds.   Pulmonary:      Effort: Pulmonary effort is normal.      Breath sounds: Normal breath sounds.   Abdominal:      General: Bowel sounds are normal.      Palpations: Abdomen is soft.   Musculoskeletal:         General: No tenderness. Normal range of motion.   Skin:     General: Skin is warm and dry.      Findings: No rash.      Nails: There is no clubbing.   Neurological:      Mental Status: She is alert and oriented to person, place, and time.   Psychiatric:         Behavior: Behavior normal.         Labs:  Lab Results   Component Value Date    WBC 7.43 02/25/2025    HGB 13.0 02/25/2025    HCT 41.3 02/25/2025     02/25/2025    K 3.7 02/25/2025     02/25/2025    CO2 22 (L) 02/25/2025    BUN 12 02/25/2025    CREATININE 1.3 02/25/2025    EGFRNORACEVR 46.2 (A) 02/25/2025    CALCIUM 9.5 02/25/2025    PHOS 4.4 02/25/2025    MG 1.9 02/25/2025    ALBUMIN 3.8 02/25/2025    AST 10 12/31/2024    ALT 9 (L) 12/31/2024    UTPCR 0.12 02/25/2025    .7 (H) 08/10/2023    TACROLIMUS 5.7 02/25/2025       Labs were reviewed with the patient    Assessment:     1. S/P kidney transplant 5/23/24.    2. Benign hypertension with CKD (chronic kidney disease) stage III    3. Immunodeficiency due to long term immunosuppressive drug therapy    4. Type 2 DM with CKD stage 3 and hypertension    5. Mixed hyperlipidemia        Plan:   Dull " pain over the kidney for 2 days---needs kidney transplant US  Routine f/u with virtual visit      1. Immunosuppression: Prograf trough 5.7,  Continue Prograf 2/2   mg BID, and Prednisone. Will continue to monitor for drug toxicities      2. Allograft Function: ckd 3b, Stable. Continue good oral hydration.   - ESRD secondary to DM2 s/p living kidney transplant on 5/23/24.   - post transplant course has been uncomplicated to date.  -BL ~1.3-1.4  Lab Results   Component Value Date    CREATININE 1.3 02/25/2025      Latest Reference Range & Units 9mo 0wk,  Kidney-Post 1 Year  02/25/25   Creatinine 0.5 - 1.4 mg/dL 1.3   eGFR >60 mL/min/1.73 m^2 46.2 !   Glucose 70 - 110 mg/dL 144 (H)         DM : cont insulin as prescribed --mgmt deferred to endocrinology  Lab Results   Component Value Date    HGBA1C 5.5 02/03/2025       3. Hypertension management: advise low salt diet and home BP monitoring    BP Readings from Last 3 Encounters:   02/17/25 119/81   02/13/25 132/60   01/30/25 114/82   Coreg 6.25mg BID    4. Metabolic Bone Disease/Secondary Hyperparathyroidism:  Vit D 5000units daily  Lab Results   Component Value Date    .7 (H) 08/10/2023    CALCIUM 9.5 02/25/2025    PHOS 4.4 02/25/2025      Latest Reference Range & Units 9mo 0wk,  Kidney-Post 1 Year  02/25/25   Magnesium  1.6 - 2.6 mg/dL 1.9         5. Electrolytes: stable. No need for intervention   Lab Results   Component Value Date     02/25/2025    K 3.7 02/25/2025     02/25/2025    CO2 22 (L) 02/25/2025       6. Anemia: stable. No need for intervention   Lab Results   Component Value Date    WBC 7.43 02/25/2025    HGB 13.0 02/25/2025    HCT 41.3 02/25/2025    MCV 84 02/25/2025     (L) 02/25/2025         7. Proteinuria: continue p/c ratio as per guidelines   [facility]  2/25/25---0.12       8. BK virus infection screening:  BK PCR per protocol   2/25/25 not detected <125    9. Weight education: provided during the clinic visit   There is no  height or weight on file to calculate BMI.     10. Patient safety education regarding immunosuppression including prophylaxis posttransplant for CMV, PCP  completed  PCP ppx: Atovaquone until 11/19/24  CMV ppx: Valcyte until 8/21/24     Hypothyroidism-- levothyroxine 125mcg before breakfast      Follow-up:   Clinic: return to transplant clinic weekly for the first month after transplant; every 2 weeks during months 2-3; then at 6-, 9-, 12-, 18-, 24-, and 36- months post-transplant to reassess for complications from immunosuppression toxicity and monitor for rejection.  Annually thereafter.    Labs: since patient remains at high risk for rejection and drug-related complications that warrant close monitoring, labs will be ordered as follows: continue twice weekly CBC, renal panel, and drug level for first month; then same labs once weekly through 3rd month post-transplant.  Urine for UA and protein/creatinine ratio monthly.  Serum BK - PCR at 1-, 3-, 6-, 9-, 12-, 18-, 24-, 36-, 48-, and 60 months post-transplant.  Hepatic panel at 1-, 2-, 3-, 6-, 9-, 12-, 18-, 24-, and 36- months post-transplant.    Maria Del Carmen Holland NP       Education:   Material provided to the patient.  Patient reminded to call with any health changes since these can be early signs of significant complications.  Also, I advised the patient to be sure any new medications or changes of old medications are discussed with either a pharmacist or physician knowledgeable with transplant to avoid rejection/drug toxicity related to significant drug interactions.    Patient advised that it is recommended that all transplanted patients, and their close contacts and household members receive Covid vaccination.

## 2025-03-12 ENCOUNTER — HOSPITAL ENCOUNTER (OUTPATIENT)
Dept: RADIOLOGY | Facility: HOSPITAL | Age: 65
Discharge: HOME | End: 2025-03-12
Attending: NURSE PRACTITIONER
Payer: MEDICARE

## 2025-03-12 ENCOUNTER — OFFICE VISIT (OUTPATIENT)
Dept: TRANSPLANT | Facility: CLINIC | Age: 65
End: 2025-03-12
Payer: MEDICARE

## 2025-03-12 VITALS
SYSTOLIC BLOOD PRESSURE: 145 MMHG | WEIGHT: 232.56 LBS | RESPIRATION RATE: 18 BRPM | HEART RATE: 99 BPM | HEIGHT: 70 IN | TEMPERATURE: 98 F | BODY MASS INDEX: 33.29 KG/M2 | DIASTOLIC BLOOD PRESSURE: 80 MMHG | OXYGEN SATURATION: 98 %

## 2025-03-12 DIAGNOSIS — E11.22 TYPE 2 DM WITH CKD STAGE 3 AND HYPERTENSION: ICD-10-CM

## 2025-03-12 DIAGNOSIS — Z94.0 S/P KIDNEY TRANSPLANT: Primary | ICD-10-CM

## 2025-03-12 DIAGNOSIS — R10.31 RIGHT LOWER QUADRANT ABDOMINAL PAIN: ICD-10-CM

## 2025-03-12 DIAGNOSIS — I12.9 TYPE 2 DM WITH CKD STAGE 3 AND HYPERTENSION: ICD-10-CM

## 2025-03-12 DIAGNOSIS — T45.1X5A IMMUNODEFICIENCY DUE TO LONG TERM IMMUNOSUPPRESSIVE DRUG THERAPY: ICD-10-CM

## 2025-03-12 DIAGNOSIS — D84.821 IMMUNODEFICIENCY DUE TO LONG TERM IMMUNOSUPPRESSIVE DRUG THERAPY: ICD-10-CM

## 2025-03-12 DIAGNOSIS — N18.30 BENIGN HYPERTENSION WITH CKD (CHRONIC KIDNEY DISEASE) STAGE III: ICD-10-CM

## 2025-03-12 DIAGNOSIS — N18.30 TYPE 2 DM WITH CKD STAGE 3 AND HYPERTENSION: ICD-10-CM

## 2025-03-12 DIAGNOSIS — I12.9 BENIGN HYPERTENSION WITH CKD (CHRONIC KIDNEY DISEASE) STAGE III: ICD-10-CM

## 2025-03-12 DIAGNOSIS — E78.2 MIXED HYPERLIPIDEMIA: ICD-10-CM

## 2025-03-12 DIAGNOSIS — Z79.899 IMMUNODEFICIENCY DUE TO LONG TERM IMMUNOSUPPRESSIVE DRUG THERAPY: ICD-10-CM

## 2025-03-12 DIAGNOSIS — Z94.0 S/P KIDNEY TRANSPLANT: ICD-10-CM

## 2025-03-12 PROCEDURE — 3044F HG A1C LEVEL LT 7.0%: CPT | Mod: HCNC,CPTII,S$GLB, | Performed by: NURSE PRACTITIONER

## 2025-03-12 PROCEDURE — 3008F BODY MASS INDEX DOCD: CPT | Mod: HCNC,CPTII,S$GLB, | Performed by: NURSE PRACTITIONER

## 2025-03-12 PROCEDURE — 99999 PR PBB SHADOW E&M-EST. PATIENT-LVL III: CPT | Mod: PBBFAC,HCNC,, | Performed by: NURSE PRACTITIONER

## 2025-03-12 PROCEDURE — 76776 US EXAM K TRANSPL W/DOPPLER: CPT | Mod: 26,HCNC,, | Performed by: RADIOLOGY

## 2025-03-12 PROCEDURE — 3079F DIAST BP 80-89 MM HG: CPT | Mod: HCNC,CPTII,S$GLB, | Performed by: NURSE PRACTITIONER

## 2025-03-12 PROCEDURE — 76776 US EXAM K TRANSPL W/DOPPLER: CPT | Mod: TC,HCNC

## 2025-03-12 PROCEDURE — 99214 OFFICE O/P EST MOD 30 MIN: CPT | Mod: HCNC,S$GLB,, | Performed by: NURSE PRACTITIONER

## 2025-03-12 PROCEDURE — 3077F SYST BP >= 140 MM HG: CPT | Mod: HCNC,CPTII,S$GLB, | Performed by: NURSE PRACTITIONER

## 2025-03-12 NOTE — LETTER
March 12, 2025        Fred Kelly  [address]  [facility] [address]  Phone: [phone]  Fax: [phone]             [facility]  [address]  [facility] [address]  Phone: [phone]   Patient: Kathie Quezada   MR Number: [id number]   YOB: 1960   Date of Visit: 3/12/2025       Dear Dr. Fred Kelly    Thank you for referring Kathie Quezada to me for evaluation. Attached you will find relevant portions of my assessment and plan of care.    If you have questions, please do not hesitate to call me. I look forward to following Kathie Quezada along with you.    Sincerely,    Maria Del Carmen Holland NP    Enclosure    If you would like to receive this communication electronically, please contact [email or web address] or [phone] to request [employer] access.    [employer] is a tool which provides read-only access to select patient information with whom you have a relationship. Its easy to use and provides real time access to review your patients record including encounter summaries, notes, results, and demographic information.    If you feel you have received this communication in error or would no longer like to receive these types of communications, please e-mail [email or web address]

## 2025-03-13 ENCOUNTER — RESULTS FOLLOW-UP (OUTPATIENT)
Dept: TRANSPLANT | Facility: CLINIC | Age: 65
End: 2025-03-13

## 2025-03-25 ENCOUNTER — PATIENT MESSAGE (OUTPATIENT)
Dept: INTERNAL MEDICINE | Facility: CLINIC | Age: 65
End: 2025-03-25
Payer: MEDICARE

## 2025-03-25 ENCOUNTER — RESULTS FOLLOW-UP (OUTPATIENT)
Dept: TRANSPLANT | Facility: CLINIC | Age: 65
End: 2025-03-25
Payer: MEDICARE

## 2025-03-25 DIAGNOSIS — Z94.0 S/P KIDNEY TRANSPLANT: ICD-10-CM

## 2025-03-25 DIAGNOSIS — Z20.828 EXPOSURE TO THE FLU: Primary | ICD-10-CM

## 2025-03-25 RX ORDER — OSELTAMIVIR PHOSPHATE 75 MG/1
75 CAPSULE ORAL 2 TIMES DAILY
Qty: 10 CAPSULE | Refills: 0 | Status: SHIPPED | OUTPATIENT
Start: 2025-03-25 | End: 2025-03-30

## 2025-03-25 RX ORDER — TACROLIMUS 1 MG/1
CAPSULE ORAL
Qty: 240 CAPSULE | Refills: 11 | Status: SHIPPED | OUTPATIENT
Start: 2025-03-25

## 2025-03-25 NOTE — TELEPHONE ENCOUNTER
Disp Refills Start End SARA   oseltamivir (TAMIFLU) 75 MG capsule 10 capsule 0 3/25/2025 3/30/2025 --   Sig - Route: Take 1 capsule (75 mg total) by mouth 2 (two) times daily. for 5 days - Oral   Sent to pharmacy as: oseltamivir (TAMIFLU) 75 MG capsule   Class: Normal   Order: [id number]   Date/Time Signed: 3/25/2025 10:41         Pharmacy    [facility]    Associated Diagnoses    Exposure to the flu  - Primary
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
## 2025-03-31 DIAGNOSIS — Z94.0 S/P KIDNEY TRANSPLANT: Primary | ICD-10-CM

## 2025-04-06 DIAGNOSIS — E55.9 VITAMIN D DEFICIENCY: ICD-10-CM

## 2025-04-06 RX ORDER — ACETAMINOPHEN 500 MG
5000 TABLET ORAL DAILY
Qty: 90 TABLET | Refills: 3 | Status: SHIPPED | OUTPATIENT
Start: 2025-04-06

## 2025-04-06 NOTE — TELEPHONE ENCOUNTER
Care Due:                  Date            Visit Type   Department     Provider  --------------------------------------------------------------------------------                                EP -                              PRIMARY      [facility]  Last Visit: 01-      CARE ([facility])   MEDICINE       Elan Price  Next Visit: None Scheduled  None         None Found                                                            Last  Test          Frequency    Reason                     Performed    Due Date  --------------------------------------------------------------------------------    Lipid Panel.  12 months..  bempedoic................  05- 04-    Vitamin D...  12 months..  cholecalciferol,.........  07- 06-    [employer] Embedded Care Due Messages. Reference number: [id number].   4/06/2025 11:24:46 AM CDT

## 2025-04-17 NOTE — TELEPHONE ENCOUNTER
Please place a stat referral to hematology and let patient know that we strongly encourage her to follow up with them. I know she is concerned about the expense, but her hemoglobin was extremely dangerously low. It can happen again if we don't figure out what is causing it.   Pt cancelled appt for 07/17/2019, due to not being in town due to the weather.

## 2025-04-21 ENCOUNTER — RESULTS FOLLOW-UP (OUTPATIENT)
Dept: TRANSPLANT | Facility: CLINIC | Age: 65
End: 2025-04-21
Payer: MEDICARE

## 2025-04-21 RX ORDER — TIRZEPATIDE 7.5 MG/.5ML
INJECTION, SOLUTION SUBCUTANEOUS
Qty: 4 PEN | Refills: 3 | OUTPATIENT
Start: 2025-04-21

## 2025-04-21 NOTE — LETTER
July 31, 2024        Fred Kelly  [address]  [facility] [address]  Phone: [phone]  Fax: [phone]             [facility]  [address]  [facility] [address]  Phone: [phone]   Patient: Kathie Quezada   MR Number: [id number]   YOB: 1960   Date of Visit: 7/31/2024       Dear Dr. Fred Kelly    Thank you for referring Kathie Quezada to me for evaluation. Attached you will find relevant portions of my assessment and plan of care.    If you have questions, please do not hesitate to call me. I look forward to following Kathie Quezada along with you.    Sincerely,    Maria Del Carmen Holland NP    Enclosure    If you would like to receive this communication electronically, please contact [email or web address] or [phone] to request [employer] access.    [employer] is a tool which provides read-only access to select patient information with whom you have a relationship. Its easy to use and provides real time access to review your patients record including encounter summaries, notes, results, and demographic information.    If you feel you have received this communication in error or would no longer like to receive these types of communications, please e-mail [email or web address]   
Home

## 2025-04-22 ENCOUNTER — PATIENT MESSAGE (OUTPATIENT)
Dept: INFECTIOUS DISEASES | Facility: CLINIC | Age: 65
End: 2025-04-22
Payer: MEDICARE

## 2025-04-22 RX ORDER — TIRZEPATIDE 7.5 MG/.5ML
INJECTION, SOLUTION SUBCUTANEOUS
Qty: 4 PEN | Refills: 3 | OUTPATIENT
Start: 2025-04-22
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
## 2025-04-23 DIAGNOSIS — A53.9 SYPHILIS: Primary | ICD-10-CM

## 2025-04-23 NOTE — PROGRESS NOTES
FOLLOW-UP PATIENT VISIT  The patient location is: [place]  The chief complaint leading to consultation is: diabetes, hypothyroidism, and osteoporosis  Chief Complaint   Patient presents with    Follow-up    Diabetes     Visit type: audiovisual    Face to Face time with patient: 20 minutes  45 minutes of total time spent on the encounter, which includes face to face time and non-face to face time preparing to see the patient (eg, review of tests), Obtaining and/or reviewing separately obtained history, Documenting clinical information in the electronic or other health record, Independently interpreting results (not separately reported) and communicating results to the patient/family/caregiver, or Care coordination (not separately reported).    Each patient to whom he or she provides medical services by telemedicine is:  (1) informed of the relationship between the physician and patient and the respective role of any other health care provider with respect to management of the patient; and (2) notified that he or she may decline to receive medical services by telemedicine and may withdraw from such care at any time.    Subjective:      Chief Complaint: Diabetes/thyroid    HPI: Kathie Quezada is a 65 y.o. female who is here for type 2 diabetes mellitus  PMH includes: ESRD s/p kidney transplant (05/2024) on immunosuppression with prednisone and tacrolimus, hypothyroidism, CAD, CVA, factor XII deficiency, hepatitis-C (s/p treatment), pulmonary hypertension, diastolic heart failure.     LOV with Jose F Ríos and Ayanna 01/2025  At the last visit, TSH was suppressed.  Lantus and Novolog was discontinued and Mounjaro increased due to excellent control per Dexcom.   Since the last visit, patient has been out of Mounjaro x1 month due to pharmacy issues. Since stopping, she's had increased cravings, blood glucose, and weight. She resumed Novolog 12 units with meals due to hyperglycemia running between 200-300 yesterday  and today.    Regarding diabetes:  Initially Diagnosed with T2D: 2005, was on insulin but stopped it when she got her kidney transplant.     Known diabetic complications: nephropathy, retinopathy, peripheral neuropathy, and cardiovascular disease  Cardiovascular risk factors: diabetes mellitus, dyslipidemia, hypertension, and obesity (BMI >= 30 kg/m2)    Current diabetic medications include:   Mounjaro 7.5 mg- out for 1 month  Novolog 12 units with meals     Other medications tried:  Metformin - diarrhea  MDI    Diet:   Breakfast: 1 packet of grits or 1 banana with 1 boiled egg  Lunch: salad when she was on Mounjaro, lately has been eating a sandwich  Dinner: beans, chicken  Snack: increasing since stopping Mounjaro- cookies  Drinks: water    Exercise: walking daily    Glucose Trends:  Freestyle malissa        Episodes of hypoglycemia? Overnight BG 50s when she was on Mounjaro and without insulin. However, she notices her Malissa indicates hypoglycemia more often when she lays on the side of the Malissa. Has not had recent hypoglycemia  Symptoms: denies  Correction: peppermint    Family Hx of DM:   Mother - DM2   Father - denies           Wt Readings from Last 10 Encounters:   05/06/25 105.7 kg (233 lb 0.4 oz)   04/29/25 106.1 kg (234 lb)   03/12/25 105.5 kg (232 lb 9.4 oz)   02/17/25 104.5 kg (230 lb 6.1 oz)   02/13/25 96.6 kg (213 lb)   01/30/25 97.6 kg (215 lb 2.7 oz)   12/31/24 103.4 kg (228 lb)   11/04/24 106.7 kg (235 lb 3.7 oz)   10/22/24 106.4 kg (234 lb 7.4 oz)   10/11/24 101.2 kg (223 lb)       Diabetes Management Status  Statin: Not taking due to myopathy  ACE/ARB: Not taking    Screening or Prevention Patient's value   HgA1C Testing and Control   Lab Results   Component Value Date    HGBA1C 5.5 02/03/2025    HGBA1C 5.5 05/23/2024    HGBA1C 5.3 08/01/2023    HGBA1C 5.2 02/02/2023    HGBA1C 5.5 11/17/2022    HGBA1C 6.8 (H) 09/07/2022    HGBA1C 6.2 (H) 12/21/2021    HGBA1C 5.7 (H) 12/01/2020    HGBA1C 5.9 (H)  11/03/2020    HGBA1C 5.6 05/13/2020      Lipid profile : 05/02/2024   LDL control Lab Results   Component Value Date    LDLCALC 90.6 05/02/2024      Nephropathy screening Lab Results   Component Value Date    LABMICR 1896.0 04/09/2018     Lab Results   Component Value Date    PROTEINUA Negative 02/25/2025      Blood pressure BP Readings from Last 1 Encounters:   03/12/25 (!) 145/80      Dilated retinal exam : 08/06/2024   Foot exam   : 09/24/2024        Regarding Postoperative Hypothyroidism:    - Duration of hypothyroidism: s/p total thyroidectomy over 5 years ago at [facility] for MNG with compressive sxs  - Current relevant medications: levothyroxine T4 (Synthroid) 125 mcg daily  - Takes thyroid medications properly  - Reports fatigue but denies other overt symptoms of hypothyroidism    Lab Results   Component Value Date    TSH 0.205 (L) 02/03/2025    TSH 0.018 (L) 10/22/2024    TSH <0.010 (L) 07/01/2024    FREET4 1.25 02/03/2025    FREET4 1.09 10/22/2024    FREET4 1.57 (H) 07/01/2024       Regarding Osteoporosis:  - Most recent DEXA: 10/11/24, Osteoporosis based on low trauma spine fracture in 2010        Medications: Alendronate- started 10/2024 and discontinued as soon as she starated- reports diarrhea. Does not want to resume due to medication burden  Calcium intake: denies; diet- cheese, greens, shrimp, salmon  Vit D intake: D3 5000 IU twice daily    Weight bearing exercise: walks daily  Balance: good  Falls: denies  Fractures: denies recent fractures   - Low trauma spine fracture in 2010  Dental Visit: 12/2024- has false teeth      ROS: See HPI      Reviewed past medical, family, social history and updated as appropriate.    Objective:       Vitals and physical not performed - virtual visit        Assessment/Plan:     Problem List Items Addressed This Visit          Neuro    Diabetic polyneuropathy associated with type 2 diabetes mellitus (Chronic)    Optimize blood glucose control              Cardiac/Vascular     Mixed hyperlipidemia     - No longer on Zetia and intolerant to statins   - Will resume GLP1 agonist              Endocrine    Vitamin D deficiency     - Check Vitamin D         Class 1 obesity due to excess calories with serious comorbidity and body mass index (BMI) of 31.0 to 31.9 in adult     - Encouraged dietary and lifestyle modifications.   - Emphasized weight loss goals.   - Resume GLP1 agonist         Type 2 DM with CKD stage 3 and hypertension     - A1c goal <7.5% (history of CKD s/p kidney transplant)   - Last A1C 5.5%   - Reviewed logs/CGM: noted that patient has been out of Mounjaro for 1 month- GMI 7.1%, TIR 71%, 29% high/very high, 0% low. Despite being out of Mounjaro, blood glucose has remained in good control. Some prandial hyperglycemia but normalizes appropriately. However, most recent days show that glucose is steadily increasing as patient's appetite has increased. Noted that patient is on chronic Prednisone 5 mg daily following kidney transplant. Hypoglycemia likely from compression when she is sleeping.     Medication:    - Resume Mounjaro 5 mg weekly   - Stop Novolog      - Reviewed goals of therapy are to get the best control we can without hypoglycemia.   - Advised frequent self blood glucose monitoring.  Patient encouraged to document glucose results and bring them to every clinic visit.   - Hypoglycemia precautions discussed. Instructed on precautions before driving.     - Call for Bg repeatedly < 70 or > 180.    - Close adherence to lifestyle changes recommended.    - Periodic follow ups for eye evaluations, foot care and dental care suggested.           Other osteoporosis without current pathological fracture     - Risks include menopause, glucocorticoid use for kidney transplant   - Reassuring they are not fracturing    - Previous treatment: Alendronate 10/2024   - Patient declines alternative medication at this time   - Discussed risk of bone fracture due to osteoporosis   - Will  re-visit at the next follow-up    - Calcium and Vitamin D RDD provided.   - Weight-bearing exercise as tolerated   - Fall precautions made in the home.   - Alerted that if dental work needs to be done it should be done prior to initiating therapy. Dental health: up to date (has false teeth)   - Repeat DEXA scan 10/2026           Postoperative hypothyroidism (Chronic)     - Labs : check TSH   - Clinically euthyroid but biochemically hyperthyroid on last labs   - Goal is a normal TSH   - Avoid exogenous hyperthyroidism as this can accelerate bone loss and increase risk of CV complications.   - Patient may require less thyroid replacement with additional weight loss on GLP1 agonist     Medication Changes: continue Levothyroxine 125 mcg once daily for now pending lab results     - Advised to take LT4 on an empty stomach with water and to wait 30-45 minutes before eating or taking other medications    - Calcium and iron need to be  from thyroid hormone replacement by 4 or > hours.   - Please note: if you are taking biotin please hold it for 5 days prior to labs as it can interfere with the thyroid testing.   - Reviewed usual times of thyroid hormone changes   - Reviewed that symptoms of hypothyroidism may not correlate with tsh, and a normal TSH is the goal of therapy. Symptoms are not a justification for over treatment.          Other Visit Diagnoses         Age-related osteoporosis without current pathological fracture    -  Primary    Relevant Orders    TSH    Vitamin D      Type 2 diabetes mellitus with stage 3b chronic kidney disease, with long-term current use of insulin        Relevant Medications    tirzepatide (MOUNJARO) 5 mg/0.5 mL PnIj      Hypothyroidism        Relevant Orders    TSH            Follow up in about 3 months (around 8/9/2025).    Visit today included increased complexity associated with the care of the problems addressed and managing the longitudinal care of the patient due to the  serious and/or complex managed problems.    Kirsten Centeno PA-C

## 2025-04-24 ENCOUNTER — TELEPHONE (OUTPATIENT)
Dept: VASCULAR SURGERY | Facility: CLINIC | Age: 65
End: 2025-04-24
Payer: MEDICARE

## 2025-04-24 NOTE — TELEPHONE ENCOUNTER
Spoke with the pt in reference to pain in left arm where AVF is located. Appt scheduled and confirmed with the pt and she verbalizes understanding of information received

## 2025-04-25 DIAGNOSIS — T82.898D ARTERIOVENOUS FISTULA OCCLUSION, SUBSEQUENT ENCOUNTER: Primary | ICD-10-CM

## 2025-04-28 RX ORDER — TIRZEPATIDE 7.5 MG/.5ML
INJECTION, SOLUTION SUBCUTANEOUS
Qty: 4 PEN | Refills: 3 | OUTPATIENT
Start: 2025-04-28

## 2025-04-29 ENCOUNTER — OFFICE VISIT (OUTPATIENT)
Dept: OBSTETRICS AND GYNECOLOGY | Facility: CLINIC | Age: 65
End: 2025-04-29
Payer: MEDICARE

## 2025-04-29 VITALS — BODY MASS INDEX: 33.5 KG/M2 | WEIGHT: 234 LBS | HEIGHT: 70 IN

## 2025-04-29 DIAGNOSIS — N89.8 VAGINAL SORE: Primary | ICD-10-CM

## 2025-04-29 DIAGNOSIS — N34.2 INFECTIVE URETHRITIS: ICD-10-CM

## 2025-04-29 DIAGNOSIS — A53.9 SYPHILIS: ICD-10-CM

## 2025-04-29 PROCEDURE — 99215 OFFICE O/P EST HI 40 MIN: CPT | Mod: HCNC,S$GLB,,

## 2025-04-29 PROCEDURE — 99999 PR PBB SHADOW E&M-EST. PATIENT-LVL III: CPT | Mod: PBBFAC,HCNC,,

## 2025-04-29 PROCEDURE — 3044F HG A1C LEVEL LT 7.0%: CPT | Mod: CPTII,HCNC,S$GLB,

## 2025-04-29 PROCEDURE — 3008F BODY MASS INDEX DOCD: CPT | Mod: CPTII,HCNC,S$GLB,

## 2025-04-29 PROCEDURE — 1159F MED LIST DOCD IN RCRD: CPT | Mod: CPTII,HCNC,S$GLB,

## 2025-04-29 PROCEDURE — 87529 HSV DNA AMP PROBE: CPT | Mod: 59,HCNC

## 2025-04-29 PROCEDURE — 1160F RVW MEDS BY RX/DR IN RCRD: CPT | Mod: CPTII,HCNC,S$GLB,

## 2025-04-29 RX ORDER — LIDOCAINE HYDROCHLORIDE 20 MG/ML
JELLY TOPICAL
Qty: 30 ML | Refills: 1 | Status: SHIPPED | OUTPATIENT
Start: 2025-04-29 | End: 2025-04-29

## 2025-04-29 RX ORDER — LIDOCAINE HYDROCHLORIDE AND HYDROCORTISONE ACETATE 30; 5 MG/G; MG/G
CREAM TOPICAL
Qty: 30 G | Refills: 1 | Status: SHIPPED | OUTPATIENT
Start: 2025-04-29

## 2025-04-29 NOTE — PROGRESS NOTES
"CC: Syphillis/Painful Vaginal sore     Kathie Quezada is a 65 y.o. female who presents for treatment of syphilis.  She has a pmhx of kidney transplant, DM2, & Hep C.  Has a painful vaginal ulcer that presented in January. Sore is on left side of clitoris, started as a blister that "popped and became sore", now just a painful open sore. Initially treated w/ doxycycline but stopped d/t SE, allergic to PCN. Scheduled for allergy test in May.   She was seen in December and labs noted positive T.Pallidum results but nonreactive RPR & FTA antibodies. Labs repeated in Feb 2025, showing same results.   Pt feels safe at home and denies domestic violence.   Denies further issues, problems, or complaints.      (+) T.Pallidium Ab- 12/31/2024, 02/17/2025  (-) RPR- 02/17/2025, 12/31/2025    Ht 5' 10" (1.778 m)   Wt 106.1 kg (234 lb)   BMI 33.58 kg/m²     Past Medical History:   Diagnosis Date    Acute respiratory failure with hypoxia and hypercapnia 05/26/2019    ATN (acute tubular necrosis) 02/08/2019    Chronic diastolic heart failure 02/07/2019    2-8-19 Mild left atrial enlargement. Mild left ventricular enlargement. Normal left ventricular systolic function. The estimated ejection fraction is 55% Indeterminate left ventricular diastolic function. Normal right ventricular systolic function. Mild mitral regurgitation. Mild to moderate tricuspid regurgitation. The estimated PA systolic pressure is 32 mm Hg Normal central venous pressure (3 m    CKD (chronic kidney disease) stage 4, GFR 15-29 ml/min 05/16/2018    CKD stage G3a/A3, GFR 45-59 and albumin creatinine ratio >300 mg/g 05/16/2018    CKD stage G3b/A3, GFR 30-44 and albumin creatinine ratio >300 mg/g 05/16/2018    Closed compression fracture of L2 lumbar vertebra 05/24/2019    Closed compression fracture of L2 lumbar vertebra 05/24/2019     IMO Regulatory Load October 2019    Clotted dialysis access 07/22/2023    Controlled type 2 diabetes with retinopathy " 04/09/2018    COVID-19 virus infection 7-1-2021 07/01/2021    CVA (cerebral vascular accident) 2017    Diabetic polyneuropathy associated with type 2 diabetes mellitus 12/04/2020    Encounter for blood transfusion     Essential hypertension 04/09/2018    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  7-31-19 Factor XI Activity 55 - 145 % 96       Gastroesophageal reflux disease without esophagitis 05/24/2019    Generalized anxiety disorder 11/08/2019    GERD (gastroesophageal reflux disease)     GIB (gastrointestinal bleeding) 10/18/2018    History of CVA (cerebrovascular accident) 11/27/2022    History of CVA (cerebrovascular accident) without residual deficits 11/27/2022    History of hepatitis C     s/p succesful Rx w/ Harvoni  - [id number] (cure) 2016    Hypertensive retinopathy, bilateral 12/10/2019    Mild nonproliferative diabetic retinopathy 05/02/2018    Mixed hyperlipidemia 04/09/2018    Myalgia due to statin 09/07/2022    Nephrotic range proteinuria 04/09/2018    Normocytic anemia 01/14/2022    NS (nuclear sclerosis), bilateral 12/10/2019    Other osteoporosis without current pathological fracture 10/11/2022      COMPARISON:  2022  FINDINGS:  Lumbar spine (L3-L4):               T-score is -1.7, and Z-score is -0.8.  Compared with previous DXA, BMD at the lumbar spine has remained stable.   Femoral neck:                          T-score is -0.9, and Z-score is -0.3.  Total hip:                                T-score is -1.1, and Z-score is -0.6.     Compared with previous DXA, BMD at the total hip     Peanut allergy 02/04/2023    PNA (pneumonia) 05/28/2019    Postoperative hypothyroidism     Prophylactic immunotherapy 05/24/2024    Pulmonary hypertension 02/06/2023    Renovascular hypertension 04/09/2018    S/P arteriovenous (AV) fistula creation 08/14/2019 8/14/19: L2 kyphoplasty with Dr. Kwon       S/P kidney transplant 05/24/2024    S/P kyphoplasty 08/14/2019 8/14/19: L2  kyphoplasty with Dr. Kwon     Slow transit constipation 05/27/2019    Type 2 diabetes mellitus with circulatory disorder, with long-term current use of insulin 04/09/2018    Type 2 diabetes mellitus with stage 3 chronic kidney disease, without long-term current use of insulin 04/09/2018    Type 2 diabetes mellitus with stage 3a chronic kidney disease, without long-term current use of insulin 03/03/2021    Type 2 diabetes mellitus with stage 3b chronic kidney disease, without long-term current use of insulin 09/07/2022     Past Surgical History:   Procedure Laterality Date    AV FISTULA PLACEMENT Left 10/27/2022    Procedure: CREATION, AV FISTULA;  Surgeon: Tirso Tavera MD;  Location: [facility] OR [address] [place] [address];  Service: Peripheral Vascular;  Laterality: Left;  Brachio basilic 1st stage     BACK SURGERY  2017    CHOLECYSTECTOMY  2015    Touro    COLONOSCOPY  11/13/2015    Dr Chino torres prep. internal hemorrhoids. diverticulosis of sigmois. NO polyps. repeat due 2025    FISTULOGRAM N/A 12/16/2022    Procedure: Fistulogram;  Surgeon: Tirso Tavera MD;  Location: [facility] CATH LAB;  Service: Peripheral Vascular;  Laterality: N/A;    FISTULOGRAM, WITH PTA Left 3/23/2023    Procedure: FISTULOGRAM, WITH PTA;  Surgeon: Tirso Tavera MD;  [facility];  Service: Peripheral Vascular;  Laterality: Left;  LUE TRANSRADIAL CO2 FISTULOGRAM    1.9 min  22.99 mGy  4.3032 Gy.cm  200cc CO2  9ml TransRadial Solution    FISTULOGRAM, WITH PTA Left 8/15/2023    Procedure: FISTULOGRAM, WITH PTA TRANSRADIAL;  Surgeon: Tirso Tavera MD;  [facility];  Service: Peripheral Vascular;  Laterality: Left;  Fluoro: 5.1 min, mGy: 7.95, Gycm2: 1.2626, contrast: 12ml    FIXATION KYPHOPLASTY Bilateral 08/14/2019    Procedure: L2 kyphoplasty Fluoro Globus;  Surgeon: Jeremie Kwon DO;  Location: [facility] OR;  Service: Neurosurgery;  Laterality: Bilateral;  GLOBUS MACRINA WINSLOW [phone] TEXTED HER @ 10:54AM ON  19  PRE-OP-BY RN  2019    HYSTERECTOMY      KIDNEY TRANSPLANT N/A 2024    Procedure: TRANSPLANT, KIDNEY;  Surgeon: Stepan Bernardo MD;  [facility];  Service: Transplant;  Laterality: N/A;    OOPHORECTOMY      one    PERCUTANEOUS TRANSLUMINAL ANGIOPLASTY OF ARTERIOVENOUS FISTULA Left 2022    Procedure: PTA, AV FISTULA;  Surgeon: Tirso Tavera MD;  Location: [facility] CATH LAB;  Service: Peripheral Vascular;  Laterality: Left;    PERCUTANEOUS TRANSLUMINAL ANGIOPLASTY OF ARTERIOVENOUS FISTULA Left 2023    Procedure: PTA, AV FISTULA;  Surgeon: Tirso Tavera MD;  Location: [facility] CATH LAB;  Service: Peripheral Vascular;  Laterality: Left;  left transradial fistulagram    RIGHT HEART CATHETERIZATION Right 6/15/2023    Procedure: INSERTION, CATHETER, RIGHT HEART;  Surgeon: Lucretia Rivas MD;  Location: [facility] CATH LAB;  Service: Cardiology;  Laterality: Right;    THYROIDECTOMY      at [facility] for thyroid nodule    TONSILLECTOMY      TRANSPOSITION OF BASILIC VEIN Left 2023    Procedure: TRANSPOSITION, VEIN, BASILIC;  Surgeon: Tirso Tavera MD;  Location: [facility] OR [address] [place] [address];  Service: Peripheral Vascular;  Laterality: Left;     Social History[1]  Family History   Problem Relation Name Age of Onset    Hypertension Father      Prostate cancer Father      Stroke Mother      Heart disease Mother      Hypertension Mother      Diabetes type II Mother      Coronary artery disease Mother      Aneurysm Brother      Breast cancer Neg Hx      Colon cancer Neg Hx       OB History          5    Para   5    Term   5            AB        Living             SAB        IAB        Ectopic        Multiple        Live Births                   Current Medications[2]      ROS:  Constitutional: no weight loss, weight gain, fever, fatigue  Genitourinary: No blood in urine, painful urination, urgency of urination, frequency of urination, incomplete emptying, incontinence, abnormal  bleeding, painful periods, heavy periods, vaginal discharge, vaginal odor, painful intercourse, sexual problems, bleeding after intercourse.painful vaginal lesion  Breast: No painful breasts, nipple discharge, masses, rash, ulcers  Neurological: No passing out, seizures, numbness, headache  Endocrine: No diabetes, hypothyroid, hyperthyroid, hot flashes, hair loss, abnormal hair growth, acne  Psychiatric: No depression, crying  Hematologic: No bruises, bleeding, swollen lymph nodes, anemia.    PHYSICAL EXAM:   Physical Exam:   Constitutional: Vital signs are normal. She appears well-developed and well-nourished.        Pulmonary/Chest: Effort normal.        Abdominal: Soft. There is no abdominal tenderness. Hernia confirmed negative in the right inguinal area and confirmed negative in the left inguinal area.     Genitourinary:    Inguinal canal normal.   Rectum:      No external hemorrhoid.            Pelvic exam was performed with patient in the lithotomy position.   The external female genitalia was normal.   External genitalia lesions present.  (3 open lesions on L side of clitoris, flat with yellow, pus base. largest approx 1cm)Genitalia hair distrobution normal .     Labial bartholins normal.There is no rash, tenderness or lesion on the right labia. There is lesion on the left labia. There is no rash or tenderness on the left labia.               Neurological: She is alert.     Psychiatric: She has a normal mood and affect. Her behavior is normal. Mood and judgment normal.        ASSESSMENT:   Vaginal sore  -     Discontinue: LIDOcaine HCL 2% (XYLOCAINE) 2 % jelly; Apply a pea sized amount to affected area 3 times a day  Dispense: 30 mL; Refill: 1  -     HSV by Rapid PCR [facility]; SWAB; Skin; Future; Expected date: 04/29/2025  -     LIDOcaine HCl-hydrocortison ac (LIDAMANTLE-HC) 3-0.5 % topical cream; Apply a pea sized amount to the affected area 3 times a day for 7 days.  Dispense: 30 g; Refill: 1    Syphilis  -      Ambulatory consult to Gynecology  -     Discontinue: LIDOcaine HCL 2% (XYLOCAINE) 2 % jelly; Apply a pea sized amount to affected area 3 times a day  Dispense: 30 mL; Refill: 1  -     HSV by Rapid PCR [facility]; SWAB; Skin; Future; Expected date: 04/29/2025  -     LIDOcaine HCl-hydrocortison ac (LIDAMANTLE-HC) 3-0.5 % topical cream; Apply a pea sized amount to the affected area 3 times a day for 7 days.  Dispense: 30 g; Refill: 1    Infective urethritis  -     Cancel: Urine Culture High Risk    PLAN:   - Pelvic exam performed. Differentials: HSV blisters, viral exanthem.   - HSV culture obtained f/ blister like, painful lesion. Will begin valtrex suppression therapy If positive culture.   - Lidocaine ordered for pain relief. Recommended allowing soapy water to run over area and not to scrub during showers/baths. Allow area to air dry and wear loose fitting garments to prevent friction.   - Repeat RPR at 6 months post-initial screening  - Scheduled for allergy testing for PCN sensitivity on 5/6.  - Follow up prn    Female Chaperone present during entire exam.                    [1]   Social History  Socioeconomic History    Marital status:    Tobacco Use    Smoking status: Never     Passive exposure: Past    Smokeless tobacco: Never   Substance and Sexual Activity    Alcohol use: Not Currently    Drug use: No    Sexual activity: Not Currently   Social History Narrative    Pt enjoys cooking and baking and making Floral arrangement and [employer]     Caregiver Daughter Mike     Social Drivers of Health     Financial Resource Strain: Low Risk  (1/1/2025)    Overall Financial Resource Strain (CARDIA)     Difficulty of Paying Living Expenses: Not very hard   Food Insecurity: Food Insecurity Present (1/1/2025)    Hunger Vital Sign     Worried About Running Out of Food in the Last Year: Never true     Ran Out of Food in the Last Year: Sometimes true   Transportation Needs: No Transportation Needs (8/9/2024)     PRAPARE - Transportation     Lack of Transportation (Medical): No     Lack of Transportation (Non-Medical): No   Physical Activity: Insufficiently Active (1/1/2025)    Exercise Vital Sign     Days of Exercise per Week: 3 days     Minutes of Exercise per Session: 30 min   Stress: No Stress Concern Present (1/1/2025)    [employer] [place] [employer] - Occupational Stress Questionnaire     Feeling of Stress : Not at all   Housing Stability: Unknown (1/1/2025)    Housing Stability Vital Sign     Unable to Pay for Housing in the Last Year: No     Homeless in the Last Year: No   [2]   Current Outpatient Medications:     blood-glucose sensor (FREESTYLE LIS 3 SENSOR) Evelyn, Change sensor every 15 days., Disp: 3 each, Rfl: 11    blood-glucose sensor (FREESTYLE LIS 3 SENSOR) Evelyn, 2 each by Misc.(Non-Drug; Combo Route) route every 14 (fourteen) days., Disp: 2 each, Rfl: 6    carvediloL (COREG) 6.25 MG tablet, Take 1 tablet (6.25 mg total) by mouth 2 (two) times daily., Disp: 180 tablet, Rfl: 3    cholecalciferol, vitamin D3, (VITAMIN D3) 125 mcg (5,000 unit) Tab, Take 1 tablet (5,000 Units total) by mouth once daily., Disp: 90 tablet, Rfl: 3    diphenhydrAMINE (BENADRYL) 25 mg capsule, Take 1 capsule (25 mg total) by mouth every 6 (six) hours as needed for Itching or Allergies., Disp: 20 capsule, Rfl: 0    hydrocortisone 2.5 % cream, Apply topically 2 (two) times daily. Apply to affected area twice daily, Disp: 453.6 g, Rfl: 0    levothyroxine (SYNTHROID) 125 MCG tablet, Take 1 tablet (125 mcg total) by mouth before breakfast., Disp: 30 tablet, Rfl: 11    mycophenolate (CELLCEPT) 250 mg Cap, Take 3 capsules (750 mg total) by mouth 2 (two) times daily., Disp: 180 capsule, Rfl: 11    predniSONE (DELTASONE) 5 MG tablet, Take by mouth daily; 5/26/2024-6/1/2024: 20 mg, 6/2/2024-6/8/2024: 15 mg; 6/9/2024-6/15/2024: 10 mg; 6/16/2024- forever: 5 mg; do not stop, Disp: 70 tablet, Rfl: 11    tacrolimus (PROGRAF) 1 MG Cap,  Take 3 capsules (3 mg total) by mouth once daily AND 2 capsules (2 mg total) every evening., Disp: 240 capsule, Rfl: 11    tirzepatide 7.5 mg/0.5 mL PnIj, Inject once weekly subcutaneously, Disp: 4 Pen, Rfl: 3    traZODone (DESYREL) 50 MG tablet, Take 1 to 2 tablets ( mg total) by mouth nightly as needed for Insomnia., Disp: 120 tablet, Rfl: 3    LIDOcaine HCl-hydrocortison ac (LIDAMANTLE-HC) 3-0.5 % topical cream, Apply a pea sized amount to the affected area 3 times a day for 7 days., Disp: 30 g, Rfl: 1

## 2025-04-30 ENCOUNTER — HOSPITAL ENCOUNTER (OUTPATIENT)
Dept: VASCULAR SURGERY | Facility: CLINIC | Age: 65
Discharge: HOME | End: 2025-04-30
Attending: SURGERY
Payer: MEDICARE

## 2025-04-30 DIAGNOSIS — T82.898D ARTERIOVENOUS FISTULA OCCLUSION, SUBSEQUENT ENCOUNTER: ICD-10-CM

## 2025-04-30 DIAGNOSIS — N18.6 ESRD (END STAGE RENAL DISEASE): Primary | ICD-10-CM

## 2025-04-30 PROCEDURE — 93990 DOPPLER FLOW TESTING: CPT | Mod: HCNC,S$GLB,, | Performed by: SURGERY

## 2025-04-30 RX ORDER — TIRZEPATIDE 7.5 MG/.5ML
INJECTION, SOLUTION SUBCUTANEOUS
Qty: 4 PEN | Refills: 3 | OUTPATIENT
Start: 2025-04-30

## 2025-05-03 LAB
HSV1 DNA SPEC QL NAA+PROBE: NEGATIVE
HSV2 DNA SPEC QL NAA+PROBE: POSITIVE
SPECIMEN SOURCE: ABNORMAL

## 2025-05-05 ENCOUNTER — TELEPHONE (OUTPATIENT)
Dept: OBSTETRICS AND GYNECOLOGY | Facility: CLINIC | Age: 65
End: 2025-05-05
Payer: MEDICARE

## 2025-05-05 ENCOUNTER — RESULTS FOLLOW-UP (OUTPATIENT)
Dept: OBSTETRICS AND GYNECOLOGY | Facility: CLINIC | Age: 65
End: 2025-05-05

## 2025-05-05 DIAGNOSIS — B00.9 HSV (HERPES SIMPLEX VIRUS) INFECTION: Primary | ICD-10-CM

## 2025-05-05 DIAGNOSIS — Z94.0 KIDNEY TRANSPLANT RECIPIENT: ICD-10-CM

## 2025-05-05 RX ORDER — VALACYCLOVIR HYDROCHLORIDE 1 G/1
1000 TABLET, FILM COATED ORAL 2 TIMES DAILY
Qty: 180 TABLET | Refills: 3 | Status: SHIPPED | OUTPATIENT
Start: 2025-05-05 | End: 2025-05-05

## 2025-05-05 RX ORDER — VALACYCLOVIR HYDROCHLORIDE 1 G/1
1000 TABLET, FILM COATED ORAL DAILY
Qty: 90 TABLET | Refills: 3 | Status: SHIPPED | OUTPATIENT
Start: 2025-05-05

## 2025-05-05 NOTE — TELEPHONE ENCOUNTER
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 Discussed that HSV likely presented d/t immunosuppression and that it was just dormant in the body for some time, and that it is a virus that can not be eradicated, just suppressed. Will start on HSV suppression therapy, cleared by transplant team. Pt v/u, denied questions/concerns.      Encouraged to complete allergy appt in case theres a need for PCN therapy in the future. Pt v/u.

## 2025-05-06 ENCOUNTER — OFFICE VISIT (OUTPATIENT)
Dept: ALLERGY | Facility: CLINIC | Age: 65
End: 2025-05-06
Payer: MEDICARE

## 2025-05-06 VITALS — BODY MASS INDEX: 33.36 KG/M2 | HEIGHT: 70 IN | WEIGHT: 233 LBS

## 2025-05-06 DIAGNOSIS — Z88.0 PENICILLIN ALLERGY: Primary | ICD-10-CM

## 2025-05-06 DIAGNOSIS — A52.8 LATE LATENT SYPHILIS: ICD-10-CM

## 2025-05-06 PROCEDURE — 99204 OFFICE O/P NEW MOD 45 MIN: CPT | Mod: HCNC,S$GLB,, | Performed by: STUDENT IN AN ORGANIZED HEALTH CARE EDUCATION/TRAINING PROGRAM

## 2025-05-06 PROCEDURE — 3044F HG A1C LEVEL LT 7.0%: CPT | Mod: CPTII,HCNC,S$GLB, | Performed by: STUDENT IN AN ORGANIZED HEALTH CARE EDUCATION/TRAINING PROGRAM

## 2025-05-06 PROCEDURE — 3008F BODY MASS INDEX DOCD: CPT | Mod: CPTII,HCNC,S$GLB, | Performed by: STUDENT IN AN ORGANIZED HEALTH CARE EDUCATION/TRAINING PROGRAM

## 2025-05-06 PROCEDURE — 99999 PR PBB SHADOW E&M-EST. PATIENT-LVL IV: CPT | Mod: PBBFAC,HCNC,, | Performed by: STUDENT IN AN ORGANIZED HEALTH CARE EDUCATION/TRAINING PROGRAM

## 2025-05-06 PROCEDURE — 1159F MED LIST DOCD IN RCRD: CPT | Mod: CPTII,HCNC,S$GLB, | Performed by: STUDENT IN AN ORGANIZED HEALTH CARE EDUCATION/TRAINING PROGRAM

## 2025-05-06 NOTE — PROGRESS NOTES
ALLERGY & IMMUNOLOGY CLINIC       HISTORY OF PRESENT ILLNESS   Referral from: Dr. Zoe Rucker Co*  CC: Drug allergies     HPI: Kathie Quezada is a 65 y.o. female  History obtained from patient    Penicillin allergy: 1985. In the hospital and was being treated with penicillin for kidney infection (before the transplant). 30 min after a pill itching and hives. Problems with her breathing as well. Had diarrhea. Given benadryl with some relieve but hives and itching lasted 1-2 days. Continue getting penicillin with diphenhydramine at the same time. No hives or itching before admission. She got it afterwards 2- 3 times. With hives a few minutes after first dose. Last time she had hives with penicillin was 5-6 years ago. She presented to ER and got epi. No hives the next day.    Doxycycline: she took it for a few days and then had with GI sxs and itching. She stopped taking it and sxs lasted a day or so afterwards.     Sulfa/Bactrim: maybe hives but cannot recall when and with that dose     On daily prednisone 5 mg   Cellcept and tacrolimus   Trazodone 50 mg qhs   Carvedilol BID     MEDICAL HISTORY   SurgHx:  Past Surgical History:   Procedure Laterality Date    AV FISTULA PLACEMENT Left 10/27/2022    Procedure: CREATION, AV FISTULA;  Surgeon: Tirso Tavera MD;  Location: [facility] OR [address] [place] [address];  Service: Peripheral Vascular;  Laterality: Left;  Brachio basilic 1st stage     BACK SURGERY  2017    CHOLECYSTECTOMY  2015    Touro    COLONOSCOPY  11/13/2015    Dr Chino torres prep. internal hemorrhoids. diverticulosis of sigmois. NO polyps. repeat due 2025    FISTULOGRAM N/A 12/16/2022    Procedure: Fistulogram;  Surgeon: Tirso Tavera MD;  Location: [facility] CATH LAB;  Service: Peripheral Vascular;  Laterality: N/A;    FISTULOGRAM, WITH PTA Left 3/23/2023    Procedure: FISTULOGRAM, WITH PTA;  Surgeon: Tirso Tavera MD;  [facility];  Service: Peripheral Vascular;  Laterality: Left;  LUE  TRANSRADIAL CO2 FISTULOGRAM    1.9 min  22.99 mGy  4.3032 Gy.cm  200cc CO2  9ml TransRadial Solution    FISTULOGRAM, WITH PTA Left 8/15/2023    Procedure: FISTULOGRAM, WITH PTA TRANSRADIAL;  Surgeon: Tirso Tavera MD;  [facility];  Service: Peripheral Vascular;  Laterality: Left;  Fluoro: 5.1 min, mGy: 7.95, Gycm2: 1.2626, contrast: 12ml    FIXATION KYPHOPLASTY Bilateral 08/14/2019    Procedure: L2 kyphoplasty Fluoro Globus;  Surgeon: Jeremie Kwon DO;  [facility] OR;  Service: Neurosurgery;  Laterality: Bilateral;  GLOBUS MACRINA WINSLOW [phone] TEXTED HER @ 10:54AM ON 7-30-19  PRE-OP-BY RN  8-7-2019    HYSTERECTOMY      KIDNEY TRANSPLANT N/A 5/23/2024    Procedure: TRANSPLANT, KIDNEY;  Surgeon: Stepan Bernardo MD;  [address]  Service: Transplant;  Laterality: N/A;    OOPHORECTOMY  1996    one    PERCUTANEOUS TRANSLUMINAL ANGIOPLASTY OF ARTERIOVENOUS FISTULA Left 12/16/2022    Procedure: PTA, AV FISTULA;  Surgeon: Tirso Tavera MD;  Location: [facility] CATH LAB;  Service: Peripheral Vascular;  Laterality: Left;    PERCUTANEOUS TRANSLUMINAL ANGIOPLASTY OF ARTERIOVENOUS FISTULA Left 11/30/2023    Procedure: PTA, AV FISTULA;  Surgeon: Tirso Tavera MD;  Location: [facility] CATH LAB;  Service: Peripheral Vascular;  Laterality: Left;  left transradial fistulagram    RIGHT HEART CATHETERIZATION Right 6/15/2023    Procedure: INSERTION, CATHETER, RIGHT HEART;  Surgeon: Lucretia Rivas MD;  Location: [facility] CATH LAB;  Service: Cardiology;  Laterality: Right;    THYROIDECTOMY  2016    at [facility] for thyroid nodule    TONSILLECTOMY      TRANSPOSITION OF BASILIC VEIN Left 1/31/2023    Procedure: TRANSPOSITION, VEIN, BASILIC;  Surgeon: Tirso Tavera MD;  [facility];  Service: Peripheral Vascular;  Laterality: Left;     Renal transplant for CKD May 23 2025   HTN   Diabetes Mellitus   Factor XI deficiency   CHF, diastolic 1998   Hypothyroid post surgical on Levo     PHYSICAL  "EXAM   VS: Ht 5' 10" (1.778 m)   Wt 105.7 kg (233 lb 0.4 oz)   BMI 33.44 kg/m²   GENERAL: NAD, well nourished, well appearing     LABS AND IMAGING     None      ASSESSMENT & PLAN     Penicillin allergy: initial reaction in 1980s with hives and shortness of breath and got what seem to be a desensitization during that stay? Was then accidentally given oral penicillin a few times with hives noted with first dose within minutes of the dose. It occurred every time. Last time was about 6 years ago. Given her presentation and the reproducible nature of her sxs she likely does have an IgE mediated reaction to penicillin. Penicillin allergies tend to decrease within 10 years but the fact she had hives 6 years ago or so makes this less likely.     -Will schedule for SPT and ID. If negative will proceed with oral challenge a different day   -If positive testing will need to use another antibiotic or undergo inpatient desensitization   -Reviewed holding off on antihistamines, acid medicines, cough medicine and her trazodone 1 week prior to testing. She is on chronic steroid but close to physiologic dose     Doxycyline adverse reaction: what she experienced is not consistent with a true IgE mediated reaction but rather a side effect.     -If testing is positive for penicillin can consider Doxycycline challenge  instead     Follow up: 1-2 weeks for penicillin SPT and ID     I spent a total of 45 minutes on the day of the visit. This includes face to face time and non-face to face time preparing to see the patient (eg, review of tests), obtaining and/or reviewing separately obtained history, documenting clinical information in the electronic or other health record, independently interpreting results and communicating results to the patient/family/caregiver, or care coordinator.        Javier Fallon MD   [facility]     "

## 2025-05-09 ENCOUNTER — OFFICE VISIT (OUTPATIENT)
Dept: ENDOCRINOLOGY | Facility: CLINIC | Age: 65
End: 2025-05-09
Payer: MEDICARE

## 2025-05-09 DIAGNOSIS — E89.0 POSTOPERATIVE HYPOTHYROIDISM: Chronic | ICD-10-CM

## 2025-05-09 DIAGNOSIS — E55.9 VITAMIN D DEFICIENCY: ICD-10-CM

## 2025-05-09 DIAGNOSIS — I12.9 TYPE 2 DM WITH CKD STAGE 3 AND HYPERTENSION: ICD-10-CM

## 2025-05-09 DIAGNOSIS — M81.0 AGE-RELATED OSTEOPOROSIS WITHOUT CURRENT PATHOLOGICAL FRACTURE: Primary | ICD-10-CM

## 2025-05-09 DIAGNOSIS — E03.9 HYPOTHYROIDISM, UNSPECIFIED TYPE: ICD-10-CM

## 2025-05-09 DIAGNOSIS — E78.2 MIXED HYPERLIPIDEMIA: ICD-10-CM

## 2025-05-09 DIAGNOSIS — E11.42 DIABETIC POLYNEUROPATHY ASSOCIATED WITH TYPE 2 DIABETES MELLITUS: Chronic | ICD-10-CM

## 2025-05-09 DIAGNOSIS — N18.30 TYPE 2 DM WITH CKD STAGE 3 AND HYPERTENSION: ICD-10-CM

## 2025-05-09 DIAGNOSIS — N18.32 TYPE 2 DIABETES MELLITUS WITH STAGE 3B CHRONIC KIDNEY DISEASE, WITH LONG-TERM CURRENT USE OF INSULIN: ICD-10-CM

## 2025-05-09 DIAGNOSIS — E11.22 TYPE 2 DM WITH CKD STAGE 3 AND HYPERTENSION: ICD-10-CM

## 2025-05-09 DIAGNOSIS — M81.8 OTHER OSTEOPOROSIS WITHOUT CURRENT PATHOLOGICAL FRACTURE: ICD-10-CM

## 2025-05-09 DIAGNOSIS — E66.811 CLASS 1 OBESITY DUE TO EXCESS CALORIES WITH SERIOUS COMORBIDITY AND BODY MASS INDEX (BMI) OF 31.0 TO 31.9 IN ADULT: ICD-10-CM

## 2025-05-09 DIAGNOSIS — Z79.4 TYPE 2 DIABETES MELLITUS WITH STAGE 3B CHRONIC KIDNEY DISEASE, WITH LONG-TERM CURRENT USE OF INSULIN: ICD-10-CM

## 2025-05-09 DIAGNOSIS — E66.09 CLASS 1 OBESITY DUE TO EXCESS CALORIES WITH SERIOUS COMORBIDITY AND BODY MASS INDEX (BMI) OF 31.0 TO 31.9 IN ADULT: ICD-10-CM

## 2025-05-09 DIAGNOSIS — E11.22 TYPE 2 DIABETES MELLITUS WITH STAGE 3B CHRONIC KIDNEY DISEASE, WITH LONG-TERM CURRENT USE OF INSULIN: ICD-10-CM

## 2025-05-09 RX ORDER — TIRZEPATIDE 5 MG/.5ML
5 INJECTION, SOLUTION SUBCUTANEOUS
Qty: 2 ML | Refills: 5 | Status: SHIPPED | OUTPATIENT
Start: 2025-05-09

## 2025-05-09 NOTE — ASSESSMENT & PLAN NOTE
- A1c goal <7.5% (history of CKD s/p kidney transplant)   - Last A1C 5.5%   - Reviewed logs/CGM: noted that patient has been out of Mounjaro for 1 month- GMI 7.1%, TIR 71%, 29% high/very high, 0% low. Despite being out of Mounjaro, blood glucose has remained in good control. Some prandial hyperglycemia but normalizes appropriately. However, most recent days show that glucose is steadily increasing as patient's appetite has increased. Noted that patient is on chronic Prednisone 5 mg daily following kidney transplant. Hypoglycemia likely from compression when she is sleeping.     Medication:    - Resume Mounjaro 5 mg weekly   - Stop Novolog      - Reviewed goals of therapy are to get the best control we can without hypoglycemia.   - Advised frequent self blood glucose monitoring.  Patient encouraged to document glucose results and bring them to every clinic visit.   - Hypoglycemia precautions discussed. Instructed on precautions before driving.     - Call for Bg repeatedly < 70 or > 180.    - Close adherence to lifestyle changes recommended.    - Periodic follow ups for eye evaluations, foot care and dental care suggested.    
 - Check Vitamin D  
 - Encouraged dietary and lifestyle modifications.   - Emphasized weight loss goals.   - Resume GLP1 agonist  
 - Labs : check TSH   - Clinically euthyroid but biochemically hyperthyroid on last labs   - Goal is a normal TSH   - Avoid exogenous hyperthyroidism as this can accelerate bone loss and increase risk of CV complications.   - Patient may require less thyroid replacement with additional weight loss on GLP1 agonist     Medication Changes: continue Levothyroxine 125 mcg once daily for now pending lab results     - Advised to take LT4 on an empty stomach with water and to wait 30-45 minutes before eating or taking other medications    - Calcium and iron need to be  from thyroid hormone replacement by 4 or > hours.   - Please note: if you are taking biotin please hold it for 5 days prior to labs as it can interfere with the thyroid testing.   - Reviewed usual times of thyroid hormone changes   - Reviewed that symptoms of hypothyroidism may not correlate with tsh, and a normal TSH is the goal of therapy. Symptoms are not a justification for over treatment.  
 - No longer on Zetia and intolerant to statins   - Will resume GLP1 agonist    
 - Risks include menopause, glucocorticoid use for kidney transplant   - Reassuring they are not fracturing    - Previous treatment: Alendronate 10/2024   - Patient declines alternative medication at this time   - Discussed risk of bone fracture due to osteoporosis   - Will re-visit at the next follow-up    - Calcium and Vitamin D RDD provided.   - Weight-bearing exercise as tolerated   - Fall precautions made in the home.   - Alerted that if dental work needs to be done it should be done prior to initiating therapy. Dental health: up to date (has false teeth)   - Repeat DEXA scan 10/2026    
Optimize blood glucose control    
35.5

## 2025-05-09 NOTE — PATIENT INSTRUCTIONS
Diabetes  Resume Mounjaro 5 mg once weekly  Stop Novolog when you start Mounjaro    Thyroid  I will reach out to you with your thyroid lab results. In the meantime, please continue Levothyroxine 125 mcg once daily.  Take Levothyroxine on an empty stomach with water and to wait 30-45 minutes before eating or taking other medications   Calcium and iron need to be  from thyroid hormone replacement by 4 or > hours.  Please note: if you are taking biotin please hold it for 5 days prior to labs as it can interfere with the thyroid testing.    Osteoporosis  We will discuss alternative medications other than Alendronate/Fosamax at our next visit.  In the meantime, please continue vitamin D3 and incorporate calcium into your diet as below.  Please continue walking and please be careful as to avoid falls.    Estimated Calcium Content of Foods:  Produce  Serving Size Estimated Calcium*    Smith greens, frozen 8 oz 360 mg   Broccoli mitchel 8 oz 200 mg   Kale, frozen 8 oz 180 mg   Soy Beans, green, boiled 8 oz 175 mg   Bok Sang, cooked, boiled 8 oz 160 mg   Figs, dried 2 figs 65 mg   Broccoli, fresh, cooked 8 oz 60 mg   Oranges 1 whole 55 mg   Seafood Serving Size Estimated Calcium*    Sardines, canned with bones 3 oz 325 mg   [place], canned with bones 3 oz 180 mg   Shrimp, canned 3 oz 125 mg   Dairy Serving Size Estimated Calcium*    Ricotta, part-skim 4 oz 335 mg   Yogurt, plain, low-fat 6 oz 310 mg   Milk, skim, low-fat, whole 8 oz 300 mg   Yogurt with fruit, low-fat 6 oz 260 mg   Mozzarella, part-skim 1 oz 210 mg   Cheddar 1 oz 205 mg   Yogurt, Greek 6 oz 200 mg   American Cheese 1 oz 195 mg   Feta Cheese 4 oz 140 mg   Cottage Cheese, 2% 4 oz 105 mg   Frozen yogurt, vanilla 8 oz 105 mg   Ice Cream, vanilla 8 oz 85 mg   Parmesan 1 tbsp 55 mg   Fortified Food Serving Size Estimated Calcium*   [place] milk, rice milk or soy milk, fortified 8 oz 300 mg   Orange juice and other fruit juices, fortified 8 oz 300 mg   Tofu,  prepared with calcium 4 oz 205 mg   Waffle, frozen, fortified 2 pieces 200 mg   Oatmeal, fortified 1 packet 140 mg   English muffin, fortified 1 muffin 100 mg   Cereal, fortified 8 oz 100-1,000 mg   Other Serving Size Estimated Calcium*   Mac & cheese, frozen 1 package 325 mg   Pizza, cheese, frozen 1 serving 115 mg   Pudding, chocolate, prepared with 2% milk 4 oz 160 mg   Beans, baked, canned 4 oz 160 mg   *The calcium content listed for most foods is estimated and can vary due to multiple factors. Check the food label to determine how much calcium is in a particular product.  If you read the nutrition label for a food source, it lists the % calcium in that food.  For an 8 oz glass of milk, for example, the label states calcium 30%.  This is equivalent to 300 mg of calcium (multiply the listed number by 10).   **Table from the National [employer]opor[employer] Foundation

## 2025-05-10 ENCOUNTER — PATIENT MESSAGE (OUTPATIENT)
Dept: ENDOCRINOLOGY | Facility: CLINIC | Age: 65
End: 2025-05-10
Payer: MEDICARE

## 2025-05-12 DIAGNOSIS — N18.32 TYPE 2 DIABETES MELLITUS WITH STAGE 3B CHRONIC KIDNEY DISEASE, WITH LONG-TERM CURRENT USE OF INSULIN: ICD-10-CM

## 2025-05-12 DIAGNOSIS — Z79.4 TYPE 2 DIABETES MELLITUS WITH STAGE 3B CHRONIC KIDNEY DISEASE, WITH LONG-TERM CURRENT USE OF INSULIN: ICD-10-CM

## 2025-05-12 DIAGNOSIS — E11.22 TYPE 2 DIABETES MELLITUS WITH STAGE 3B CHRONIC KIDNEY DISEASE, WITH LONG-TERM CURRENT USE OF INSULIN: ICD-10-CM

## 2025-05-12 RX ORDER — TIRZEPATIDE 5 MG/.5ML
5 INJECTION, SOLUTION SUBCUTANEOUS
Qty: 2 ML | Refills: 5 | Status: SHIPPED | OUTPATIENT
Start: 2025-05-12

## 2025-05-13 ENCOUNTER — OFFICE VISIT (OUTPATIENT)
Dept: ALLERGY | Facility: CLINIC | Age: 65
End: 2025-05-13
Payer: MEDICARE

## 2025-05-13 VITALS — WEIGHT: 232.38 LBS | HEIGHT: 70 IN | BODY MASS INDEX: 33.27 KG/M2

## 2025-05-13 DIAGNOSIS — Z88.0 PENICILLIN ALLERGY: ICD-10-CM

## 2025-05-13 DIAGNOSIS — J31.0 CHRONIC RHINITIS: Primary | ICD-10-CM

## 2025-05-13 DIAGNOSIS — L29.9 PRURITUS: ICD-10-CM

## 2025-05-13 DIAGNOSIS — A52.8 LATE LATENT SYPHILIS: ICD-10-CM

## 2025-05-13 PROCEDURE — 99213 OFFICE O/P EST LOW 20 MIN: CPT | Mod: 25,HCNC,S$GLB, | Performed by: STUDENT IN AN ORGANIZED HEALTH CARE EDUCATION/TRAINING PROGRAM

## 2025-05-13 PROCEDURE — 99999 PR PBB SHADOW E&M-EST. PATIENT-LVL III: CPT | Mod: PBBFAC,HCNC,, | Performed by: STUDENT IN AN ORGANIZED HEALTH CARE EDUCATION/TRAINING PROGRAM

## 2025-05-13 PROCEDURE — 3008F BODY MASS INDEX DOCD: CPT | Mod: CPTII,HCNC,S$GLB, | Performed by: STUDENT IN AN ORGANIZED HEALTH CARE EDUCATION/TRAINING PROGRAM

## 2025-05-13 PROCEDURE — 1159F MED LIST DOCD IN RCRD: CPT | Mod: CPTII,HCNC,S$GLB, | Performed by: STUDENT IN AN ORGANIZED HEALTH CARE EDUCATION/TRAINING PROGRAM

## 2025-05-13 PROCEDURE — 3044F HG A1C LEVEL LT 7.0%: CPT | Mod: CPTII,HCNC,S$GLB, | Performed by: STUDENT IN AN ORGANIZED HEALTH CARE EDUCATION/TRAINING PROGRAM

## 2025-05-13 PROCEDURE — 95018 ALL TSTG PERQ&IQ DRUGS/BIOL: CPT | Mod: HCNC,S$GLB,, | Performed by: STUDENT IN AN ORGANIZED HEALTH CARE EDUCATION/TRAINING PROGRAM

## 2025-05-13 RX ORDER — HYDROCORTISONE 25 MG/G
CREAM TOPICAL 2 TIMES DAILY PRN
Qty: 453.6 G | Refills: 5 | Status: SHIPPED | OUTPATIENT
Start: 2025-05-13

## 2025-05-13 RX ORDER — IPRATROPIUM BROMIDE 21 UG/1
2 SPRAY, METERED NASAL 3 TIMES DAILY
Qty: 30 ML | Refills: 5 | Status: SHIPPED | OUTPATIENT
Start: 2025-05-13

## 2025-05-13 NOTE — PROGRESS NOTES
ALLERGY & IMMUNOLOGY CLINIC       HISTORY OF PRESENT ILLNESS   Referral from: No ref. provider found  CC: Penicillin ID and SPT     HPI: Kathie Quezada is a 65 y.o. female  History obtained from patient    Presents today for Penicillin testing (see procedure note for details.)    Complaints of constant rhinorrhea which occurs both at night and day.     Also stating she get itchy when planting outside, thinks it might be the dirt.     Initial consultation:   Penicillin allergy: 1985. In the hospital and was being treated with penicillin for kidney infection (before the transplant). 30 min after a pill itching and hives. Problems with her breathing as well. Had diarrhea. Given benadryl with some relieve but hives and itching lasted 1-2 days. Continue getting penicillin with diphenhydramine at the same time. No hives or itching before admission. She got it afterwards 2- 3 times. With hives a few minutes after first dose. Last time she had hives with penicillin was 5-6 years ago. She presented to ER and got epi. No hives the next day.    Doxycycline: she took it for a few days and then had with GI sxs and itching. She stopped taking it and sxs lasted a day or so afterwards.     Sulfa/Bactrim: maybe hives but cannot recall when and with that dose     On daily prednisone 5 mg   Cellcept and tacrolimus   Trazodone 50 mg qhs   Carvedilol BID     MEDICAL HISTORY   SurgHx:  Past Surgical History:   Procedure Laterality Date    ADENOIDECTOMY      AV FISTULA PLACEMENT Left 10/27/2022    Procedure: CREATION, AV FISTULA;  Surgeon: Tirso Tavera MD;  Location: [facility] OR [address] [place] [address];  Service: Peripheral Vascular;  Laterality: Left;  Brachio basilic 1st stage     BACK SURGERY  2017    CHOLECYSTECTOMY  2015    [place]    COLONOSCOPY  11/13/2015    Dr Chino Herbert - brian prep. internal hemorrhoids. diverticulosis of sigmois. NO polyps. repeat due 2025    FISTULOGRAM N/A 12/16/2022    Procedure: Fistulogram;  Surgeon: Tirso CHRISTINE  MD Liang;  Location: [facility] CATH LAB;  Service: Peripheral Vascular;  Laterality: N/A;    FISTULOGRAM, WITH PTA Left 03/23/2023    Procedure: FISTULOGRAM, WITH PTA;  Surgeon: Tirso Tavera MD;  [facility];  Service: Peripheral Vascular;  Laterality: Left;  LUE TRANSRADIAL CO2 FISTULOGRAM    1.9 min  22.99 mGy  4.3032 Gy.cm  200cc CO2  9ml TransRadial Solution    FISTULOGRAM, WITH PTA Left 08/15/2023    Procedure: FISTULOGRAM, WITH PTA TRANSRADIAL;  Surgeon: Tirso Tavera MD;  [facility];  Service: Peripheral Vascular;  Laterality: Left;  Fluoro: 5.1 min, mGy: 7.95, Gycm2: 1.2626, contrast: 12ml    FIXATION KYPHOPLASTY Bilateral 08/14/2019    Procedure: L2 kyphoplasty Fluoro Globus;  Surgeon: Jeremie Kwon DO;  Location: [facility];  Service: Neurosurgery;  Laterality: Bilateral;  GLOBUS MACRINA WINSLOW [phone] TEXTED HER @ 10:54AM ON 7-30-19  PRE-OP-BY RN  8-7-2019    HYSTERECTOMY      KIDNEY TRANSPLANT N/A 05/23/2024    Procedure: TRANSPLANT, KIDNEY;  Surgeon: Stepan Bernardo MD;  [address];  Service: Transplant;  Laterality: N/A;    OOPHORECTOMY  1996    one    PERCUTANEOUS TRANSLUMINAL ANGIOPLASTY OF ARTERIOVENOUS FISTULA Left 12/16/2022    Procedure: PTA, AV FISTULA;  Surgeon: Tirso Tavera MD;  Location: [facility] CATH LAB;  Service: Peripheral Vascular;  Laterality: Left;    PERCUTANEOUS TRANSLUMINAL ANGIOPLASTY OF ARTERIOVENOUS FISTULA Left 11/30/2023    Procedure: PTA, AV FISTULA;  Surgeon: Tirso Tavera MD;  Location: [facility] CATH LAB;  Service: Peripheral Vascular;  Laterality: Left;  left transradial fistulagram    RIGHT HEART CATHETERIZATION Right 06/15/2023    Procedure: INSERTION, CATHETER, RIGHT HEART;  Surgeon: Lucretia Rivas MD;  Location: [facility] CATH LAB;  Service: Cardiology;  Laterality: Right;    THYROIDECTOMY  2016    at [facility] for [facility] nodule    TONSILLECTOMY      TRANSPOSITION OF BASILIC VEIN Left 01/31/2023    Procedure: TRANSPOSITION, VEIN,  "BASILIC;  Surgeon: Tirso Tavera MD;  Location: [facility] OR [address] [place] [address];  Service: Peripheral Vascular;  Laterality: Left;     Renal transplant for CKD May 23 2025   HTN   Diabetes Mellitus   Factor XI deficiency   CHF, diastolic 1998   Hypothyroid post surgical on Levo     PHYSICAL EXAM   VS: Ht 5' 10" (1.778 m)   Wt 105.4 kg (232 lb 5.8 oz)   BMI 33.34 kg/m²   GENERAL: NAD, well nourished, well appearing     LABS AND IMAGING     None      ASSESSMENT & PLAN     Penicillin allergy: Intradermal testing positive today and hence penicillin should be avoided.     -Offered a doxycycline challenge since she seem to have experienced side effects and not an IgE mediated reaction. Patient has decline for now.   -Will reach out to ID to let them know about the results.     Doxycyline adverse reaction: what she experienced is not consistent with a true IgE mediated reaction but rather a side effect.     -Offered  Doxycycline challenge, as above    Rhinorrhea: likely vasomotor rhinitis. Patient interested in testing to rule out allergic rhinitis.     -Indoor and outdoor immunocap scheduled.  -Sent ipratropium for her to try TID as needed     Pruritus: noted after being in contact with dirt. No rash noted. Not consistent with an allergy or ACD/ICD.    -Hydrocortisone 2/5 mg to use as needed       Follow up: as needed     I spent a total of 20 minutes on the day of the visit. This includes face to face time and non-face to face time preparing to see the patient (eg, review of tests), obtaining and/or reviewing separately obtained history, documenting clinical information in the electronic or other health record, independently interpreting results and communicating results to the patient/family/caregiver, or care coordinator.        Javier Fallon MD   [facility]       "

## 2025-05-13 NOTE — PROGRESS NOTES
Date: 5/13/2025  Risks and benefits were discussed with the patient.    TEST RESULTS  Prick (mm wheal)   Intradermal   Saline (NS/0.9%):  0 mm (neg)     6 mm (neg)  Pen G (10,000 U/mL):  0 mm (neg)     11 mm (pos)  Pre-Pen (standard):  0 mm (neg)     9 mm (pos)  Histamine (1 mg/mL):  13 mm (pos)     N/A      *0.03 mL of each substance was injected for each intradermal test.    INTERPRETATION: Positive for evidence of penicillin allergy.  Test exhibited good negative and positive controls.        Patient history of penicillin allergy, now s/p PCN skin testing: There is evidence of penicillin-specific IgE; see test result section for details.  This patient should avoid therapy with penicillin as there is a 50% or greater chance of an immediate, potentially life-threatening, reaction if penicillin is given again.  An alternate antibiotic is recommended.  If no alternative antibiotic is available and the Infectious Disease specialist deems penicillin therapy necessary, a desensitization or temporary induction of tolerance protocol can be attempted in the Intensive Care Unit.          Javier Fallon MD  [facility]

## 2025-05-15 ENCOUNTER — TELEPHONE (OUTPATIENT)
Dept: INFECTIOUS DISEASES | Facility: CLINIC | Age: 65
End: 2025-05-15

## 2025-05-15 ENCOUNTER — OFFICE VISIT (OUTPATIENT)
Dept: INFECTIOUS DISEASES | Facility: CLINIC | Age: 65
End: 2025-05-15
Payer: MEDICARE

## 2025-05-15 VITALS
WEIGHT: 233 LBS | BODY MASS INDEX: 33.36 KG/M2 | HEART RATE: 83 BPM | HEIGHT: 70 IN | SYSTOLIC BLOOD PRESSURE: 116 MMHG | DIASTOLIC BLOOD PRESSURE: 83 MMHG | TEMPERATURE: 98 F

## 2025-05-15 DIAGNOSIS — A53.9 SYPHILIS: ICD-10-CM

## 2025-05-15 PROCEDURE — 99999 PR PBB SHADOW E&M-EST. PATIENT-LVL IV: CPT | Mod: PBBFAC,HCNC,, | Performed by: INTERNAL MEDICINE

## 2025-05-15 NOTE — TELEPHONE ENCOUNTER
Outpatient Antibiotic Therapy Plan:    Referral to [facility].  Referral for Home Health services.    1) Infection: Syphilis     2) Antibiotics: Ceftriaxone    *PT NEEDS LINE PLACEMENT*    Intravenous antibiotics:  Ceftriaxone 2g  IV q daily      3) Therapy Duration:  14 days    Estimated end date of IV antibiotics: 5/30/2025    4) Outpatient Weekly Labs:    Order the following labs to be drawn on Mondays:   CBC  CMP         5) Fax Lab Results to Infectious Diseases Provider: Zoe Montejo D.O.     [facility] Fax Number: [phone]    6) Ordering Information:    Orders Mode: Verbal with readback  Ordering Provider: Zoe Montejo D.O.

## 2025-05-15 NOTE — PROGRESS NOTES
Subjective:     Patient ID: Kathie Quezada is a 65 y.o. female    Chief Complaint: syphilis    HPI: 65F with Ktx 2024 seen today for follow up positive syphilis testing. She was recently seen by allergy for PCN allergy testing, and was positive on skin testing, so oral challenge was not pursued. 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 She was started on valtrex. States that lesions are now gone and she is not having any issue. Here today to discuss treatment options for positive syphilis test.       Immunization History   Administered Date(s) Administered    COVID-19 MRNA, LN-S PF (MODERNA HALF 0.25 ML DOSE) 11/19/2021    COVID-19, mRNA, LNP-S, PF (Moderna) Ages 12+ 02/02/2024    COVID-19, mRNA, LNP-S, bivalent booster, PF (PFIZER OMICRON) 09/19/2022    COVID-19, vector-nr, rS-Ad26, PF ([facility]) 03/05/2021    Influenza - Quadrivalent - PF *Preferred* (6 months and older) 01/31/2019, 11/08/2019, 11/03/2020, 11/19/2021, 09/19/2022, 09/23/2023    Influenza - Trivalent - Fluad - Adjuvanted - PF (65 years and older 09/24/2024    PPD Test 05/25/2019, 02/07/2023    Pneumococcal Conjugate - 13 Valent 11/08/2019    Pneumococcal Conjugate - 20 Valent 09/24/2024    Pneumococcal Polysaccharide - 23 Valent 10/18/2018    RSVpreF (Arexvy) 02/02/2024    Tdap 12/16/2017, 10/18/2018    Zoster Recombinant 01/08/2020, 06/23/2020        Review of Systems   All other systems reviewed and are negative.       Past Medical History:   Diagnosis Date    Acute respiratory failure with hypoxia and hypercapnia 05/26/2019    ATN (acute tubular necrosis) 02/08/2019    Chronic diastolic heart failure 02/07/2019    2-8-19 Mild left atrial enlargement. Mild left ventricular enlargement. Normal left ventricular systolic function. The estimated ejection fraction is 55% Indeterminate left ventricular diastolic function. Normal right ventricular systolic function. Mild  mitral regurgitation. Mild to moderate tricuspid regurgitation. The estimated PA systolic pressure is 32 mm Hg Normal central venous pressure (3 m    CKD (chronic kidney disease) stage 4, GFR 15-29 ml/min 05/16/2018    CKD stage G3a/A3, GFR 45-59 and albumin creatinine ratio >300 mg/g 05/16/2018    CKD stage G3b/A3, GFR 30-44 and albumin creatinine ratio >300 mg/g 05/16/2018    Closed compression fracture of L2 lumbar vertebra 05/24/2019    Closed compression fracture of L2 lumbar vertebra 05/24/2019     IMO Regulatory Load October 2019    Clotted dialysis access 07/22/2023    Controlled type 2 diabetes with retinopathy 04/09/2018    COVID-19 virus infection 7-1-2021 07/01/2021    CVA (cerebral vascular accident) 2017    Diabetic polyneuropathy associated with type 2 diabetes mellitus 12/04/2020    Encounter for blood transfusion     Essential hypertension 04/09/2018    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  7-31-19 Factor XI Activity 55 - 145 % 96       Gastroesophageal reflux disease without esophagitis 05/24/2019    Generalized anxiety disorder 11/08/2019    GERD (gastroesophageal reflux disease)     GIB (gastrointestinal bleeding) 10/18/2018    History of CVA (cerebrovascular accident) 11/27/2022    History of CVA (cerebrovascular accident) without residual deficits 11/27/2022    History of hepatitis C     s/p succesful Rx w/ Harvoni  - SVR12 (cure) 2016    Hypertensive retinopathy, bilateral 12/10/2019    Mild nonproliferative diabetic retinopathy 05/02/2018    Mixed hyperlipidemia 04/09/2018    Myalgia due to statin 09/07/2022    Nephrotic range proteinuria 04/09/2018    Normocytic anemia 01/14/2022    NS (nuclear sclerosis), bilateral 12/10/2019    Other osteoporosis without current pathological fracture 10/11/2022      COMPARISON:  2022  FINDINGS:  Lumbar spine (L3-L4):               T-score is -1.7, and Z-score is -0.8.  Compared with previous DXA, BMD at the lumbar spine has  remained stable.   Femoral neck:                          T-score is -0.9, and Z-score is -0.3.  Total hip:                                T-score is -1.1, and Z-score is -0.6.     Compared with previous DXA, BMD at the total hip     Peanut allergy 02/04/2023    PNA (pneumonia) 05/28/2019    Postoperative hypothyroidism     Prophylactic immunotherapy 05/24/2024    Pulmonary hypertension 02/06/2023    Renovascular hypertension 04/09/2018    S/P arteriovenous (AV) fistula creation 08/14/2019 8/14/19: L2 kyphoplasty with Dr. Kwon       S/P kidney transplant 05/24/2024    S/P kyphoplasty 08/14/2019 8/14/19: L2 kyphoplasty with Dr. Kwon     Slow transit constipation 05/27/2019    Type 2 diabetes mellitus with circulatory disorder, with long-term current use of insulin 04/09/2018    Type 2 diabetes mellitus with stage 3 chronic kidney disease, without long-term current use of insulin 04/09/2018    Type 2 diabetes mellitus with stage 3a chronic kidney disease, without long-term current use of insulin 03/03/2021    Type 2 diabetes mellitus with stage 3b chronic kidney disease, without long-term current use of insulin 09/07/2022     Past Surgical History:   Procedure Laterality Date    ADENOIDECTOMY      AV FISTULA PLACEMENT Left 10/27/2022    Procedure: CREATION, AV FISTULA;  Surgeon: Tirso Tavera MD;  Location: [facility] OR [address] [place] [address];  Service: Peripheral Vascular;  Laterality: Left;  Brachio basilic 1st stage     BACK SURGERY  2017    CHOLECYSTECTOMY  2015    [facility]    COLONOSCOPY  11/13/2015    Dr Chino torres prep. internal hemorrhoids. diverticulosis of sigmois. NO polyps. repeat due 2025    FISTULOGRAM N/A 12/16/2022    Procedure: Fistulogram;  Surgeon: Tirso Tavera MD;  Location: [facility] CATH LAB;  Service: Peripheral Vascular;  Laterality: N/A;    FISTULOGRAM, WITH PTA Left 03/23/2023    Procedure: FISTULOGRAM, WITH PTA;  Surgeon: Tirso Tavera MD;  [facility];  Service: Peripheral  Vascular;  Laterality: Left;  LUE TRANSRADIAL CO2 FISTULOGRAM    1.9 min  22.99 mGy  4.3032 Gy.cm  200cc CO2  9ml TransRadial Solution    FISTULOGRAM, WITH PTA Left 08/15/2023    Procedure: FISTULOGRAM, WITH PTA TRANSRADIAL;  Surgeon: Tirso Tavera MD;  [facility];  Service: Peripheral Vascular;  Laterality: Left;  Fluoro: 5.1 min, mGy: 7.95, Gycm2: 1.2626, contrast: 12ml    FIXATION KYPHOPLASTY Bilateral 08/14/2019    Procedure: L2 kyphoplasty Fluoro Globus;  Surgeon: Jeremie Kwon DO;  Location: [facility] OR;  Service: Neurosurgery;  Laterality: Bilateral;  GLOBUS MACRINA WINSLOW [phone] TEXTED HER @ 10:54AM ON 7-30-19  PRE-OP-BY RN  8-7-2019    HYSTERECTOMY      KIDNEY TRANSPLANT N/A 05/23/2024    Procedure: TRANSPLANT, KIDNEY;  Surgeon: Stepan Bernardo MD;  [facility];  Service: Transplant;  Laterality: N/A;    OOPHORECTOMY  1996    one    PERCUTANEOUS TRANSLUMINAL ANGIOPLASTY OF ARTERIOVENOUS FISTULA Left 12/16/2022    Procedure: PTA, AV FISTULA;  Surgeon: Tirso Tavera MD;  Location: [facility] CATH LAB;  Service: Peripheral Vascular;  Laterality: Left;    PERCUTANEOUS TRANSLUMINAL ANGIOPLASTY OF ARTERIOVENOUS FISTULA Left 11/30/2023    Procedure: PTA, AV FISTULA;  Surgeon: Tirso Tavera MD;  Location: [facility] CATH LAB;  Service: Peripheral Vascular;  Laterality: Left;  left transradial fistulagram    RIGHT HEART CATHETERIZATION Right 06/15/2023    Procedure: INSERTION, CATHETER, RIGHT HEART;  Surgeon: Lucretia Rivas MD;  Location: [facility] CATH LAB;  Service: Cardiology;  Laterality: Right;    THYROIDECTOMY  2016    at [facility] for thyroid nodule    TONSILLECTOMY      TRANSPOSITION OF BASILIC VEIN Left 01/31/2023    Procedure: TRANSPOSITION, VEIN, BASILIC;  Surgeon: Tirso Tavera MD;  [facility];  Service: Peripheral Vascular;  Laterality: Left;     Family History   Problem Relation Name Age of Onset    Stroke Mother      Heart disease Mother      Hypertension Mother       Diabetes type II Mother      Coronary artery disease Mother      Hypertension Father      Prostate cancer Father      Aneurysm Brother      Asthma Daughter 3     Allergies Daughter 3     Asthma Son 2     Breast cancer Neg Hx      Colon cancer Neg Hx       Social History[1]    Objective:     Physical Exam  Constitutional:       General: She is not in acute distress.     Appearance: Normal appearance. She is well-developed. She is not ill-appearing or diaphoretic.   HENT:      Head: Normocephalic and atraumatic.      Right Ear: External ear normal.      Left Ear: External ear normal.      Nose: Nose normal.   Eyes:      General: No scleral icterus.        Right eye: No discharge.         Left eye: No discharge.      Extraocular Movements: Extraocular movements intact.      Conjunctiva/sclera: Conjunctivae normal.   Pulmonary:      Effort: Pulmonary effort is normal. No respiratory distress.      Breath sounds: No stridor.   Musculoskeletal:         General: Normal range of motion.   Skin:     Findings: No erythema or rash.   Neurological:      General: No focal deficit present.      Mental Status: She is alert and oriented to person, place, and time. Mental status is at baseline.      Cranial Nerves: No cranial nerve deficit.   Psychiatric:         Mood and Affect: Mood normal.         Behavior: Behavior normal.         Thought Content: Thought content normal.         Judgment: Judgment normal.         Data:    All data, including recent labs, radiology, and pathology, has been independently reviewed.    Labs:    Recent Labs   Lab Result Units 02/25/25  0826 03/24/25  0703 04/21/25  0703   WBC K/uL 7.43 9.49 12.79*   Hemoglobin g/dL 13.0  --   --    HGB gm/dL  --  12.8 13.2   Hematocrit % 41.3  --   --    HCT %  --  40.9 41.1   Sodium mmol/L 140 140 140   Potassium mmol/L 3.7 4.0 4.0   Chloride mmol/L 106 105 104   BUN mg/dL 12 15 13   Creatinine mg/dL 1.3 1.3 1.3        Radiology:    No results found in the last  24 hours.     Assessment:    1. Syphilis  Discussed treatment options including retrying doxycycline, patient does not want to do this  Suggested alternative option is IV ceftriaxone, pt agrees to this. She has tolerated ceftriaxone in the past per chart review    Ceftriaxone 2g IV Q24H x 14d  Weekly CBC and CMP while on treatment  PICC line care per protocol     Follow up at end of care, or if any issues arise             Follow up in 2 weeks    The total time for evaluation and management services performed on 5/15/25 was greater than 30 minutes.     Visit today included increased complexity associated with the care of the episodic problem addressed and managing the longitudinal care of the patient due to the serious and/or complex managed problem(s).    Marti Montejo DO  Transplant Infectious Disease         [1]   Social History  Tobacco Use    Smoking status: Never     Passive exposure: Never    Smokeless tobacco: Never   Substance Use Topics    Alcohol use: Not Currently    Drug use: No

## 2025-05-19 ENCOUNTER — RESULTS FOLLOW-UP (OUTPATIENT)
Dept: ENDOCRINOLOGY | Facility: HOSPITAL | Age: 65
End: 2025-05-19

## 2025-05-19 DIAGNOSIS — E03.9 HYPOTHYROIDISM, UNSPECIFIED TYPE: Primary | ICD-10-CM

## 2025-05-19 DIAGNOSIS — E89.0 POSTOPERATIVE HYPOTHYROIDISM: Primary | ICD-10-CM

## 2025-05-19 RX ORDER — LEVOTHYROXINE SODIUM 112 UG/1
112 TABLET ORAL
Qty: 90 TABLET | Refills: 3 | Status: SHIPPED | OUTPATIENT
Start: 2025-05-19 | End: 2026-05-19

## 2025-05-22 ENCOUNTER — LAB REQUISITION (OUTPATIENT)
Dept: LAB | Facility: HOSPITAL | Age: 65
End: 2025-05-22

## 2025-05-22 DIAGNOSIS — N18.6 END STAGE RENAL DISEASE: ICD-10-CM

## 2025-05-22 DIAGNOSIS — A53.9 SYPHILIS, UNSPECIFIED: ICD-10-CM

## 2025-05-22 LAB
ALBUMIN SERPL-MCNC: 3.7 G/DL (ref 3.5–5.2)
ALP SERPL-CCNC: 101 UNIT/L (ref 40–150)
ALT SERPL-CCNC: 22 UNIT/L (ref 10–44)
ANION GAP (SMH): 17 MMOL/L (ref 8–16)
AST SERPL-CCNC: 27 UNIT/L (ref 11–45)
BILIRUB SERPL-MCNC: 0.4 MG/DL (ref 0.1–1)
BUN SERPL-MCNC: 13 MG/DL (ref 8–23)
CALCIUM SERPL-MCNC: 9.5 MG/DL (ref 8.7–10.5)
CHLORIDE SERPL-SCNC: 102 MMOL/L (ref 95–110)
CO2 SERPL-SCNC: 24 MMOL/L (ref 23–29)
CREAT SERPL-MCNC: 1.1 MG/DL (ref 0.5–1.4)
ERYTHROCYTE [DISTWIDTH] IN BLOOD BY AUTOMATED COUNT: 14.5 % (ref 11.5–14.5)
GFR SERPLBLD CREATININE-BSD FMLA CKD-EPI: 57 ML/MIN/1.73/M2
GLUCOSE SERPL-MCNC: 100 MG/DL (ref 70–110)
HCT VFR BLD AUTO: 38.2 % (ref 37–48.5)
HGB BLD-MCNC: 12.2 GM/DL (ref 12–16)
MCH RBC QN AUTO: 26.4 PG (ref 27–31)
MCHC RBC AUTO-ENTMCNC: 31.9 G/DL (ref 32–36)
MCV RBC AUTO: 83 FL (ref 82–98)
PLATELET # BLD AUTO: 133 K/UL (ref 150–450)
PLATELET BLD QL SMEAR: ABNORMAL
PMV BLD AUTO: 12.1 FL (ref 9.2–12.9)
POTASSIUM SERPL-SCNC: 3.7 MMOL/L (ref 3.5–5.1)
PROT SERPL-MCNC: 7.6 GM/DL (ref 6–8.4)
RBC # BLD AUTO: 4.62 M/UL (ref 4–5.4)
SODIUM SERPL-SCNC: 143 MMOL/L (ref 136–145)
WBC # BLD AUTO: 11.23 K/UL (ref 3.9–12.7)

## 2025-05-22 PROCEDURE — 84075 ASSAY ALKALINE PHOSPHATASE: CPT | Performed by: INTERNAL MEDICINE

## 2025-05-22 PROCEDURE — G0180 MD CERTIFICATION HHA PATIENT: HCPCS | Mod: ,,, | Performed by: INTERNAL MEDICINE

## 2025-05-22 PROCEDURE — 85027 COMPLETE CBC AUTOMATED: CPT | Performed by: INTERNAL MEDICINE

## 2025-05-23 ENCOUNTER — OFFICE VISIT (OUTPATIENT)
Dept: VASCULAR SURGERY | Facility: CLINIC | Age: 65
End: 2025-05-23
Payer: MEDICARE

## 2025-05-23 VITALS
HEIGHT: 67 IN | HEART RATE: 91 BPM | TEMPERATURE: 98 F | DIASTOLIC BLOOD PRESSURE: 93 MMHG | WEIGHT: 235.88 LBS | SYSTOLIC BLOOD PRESSURE: 129 MMHG | BODY MASS INDEX: 37.02 KG/M2

## 2025-05-23 DIAGNOSIS — T82.898D ARTERIOVENOUS FISTULA OCCLUSION, SUBSEQUENT ENCOUNTER: Primary | ICD-10-CM

## 2025-05-23 PROCEDURE — 99999 PR PBB SHADOW E&M-EST. PATIENT-LVL III: CPT | Mod: PBBFAC,HCNC,, | Performed by: STUDENT IN AN ORGANIZED HEALTH CARE EDUCATION/TRAINING PROGRAM

## 2025-05-23 NOTE — PROGRESS NOTES
Kathie Quezada  05/23/2025    HPI:  Patient is a 65 y.o. female with a h/o obesity (BMI 34), stage V CKD on HD since Aug 2023 s/p Kidney Transplant 2024, DMII with retinopathy, HTN, CVA, Factor 11 deficiency, and multiple AKIS the last was from urinary retention who is here today for f/u.  Patient had kidney transplant 5/23/24 and is doing well. Creatinine is stable 1.1. Her ultrasound shows non-functioning Left AVF. She currently has a port in her right arm for antibiotic use for herpes and syphilis.     INTERVAL HISTORY:  Ultrasound performed 4/30/25 showed fistula occlusion. She underwent successful kidney transplant on 5/23/24.  She is no longer on dialysis.  Her kidney is functioning well.    8/15/23  L TRA, L BVT AVF fistulagram  PTA proximal stenoses x2 with 6x40mm Mehran balloon; outflow stenosis with 6x40mm Mehran balloon     3/23/23  L TRA, L BVT AVF - PTA proximal diffuse stenoses with 6x80mm DCB (Lutonix) balloon    1/31/23  Transposed L BVT,. 2nd stage  Findings: 2nd stage L BVT AVF done; dilated to 5-6 mm with strong thrill throughout course.     12/16/22  L TRA, PTA proximal (diffuse) stenoses x3 with 6x60mm Sterlin balloon     10/27/22  Creation of 1st stage L BVT AVF     Findings/Key Components:  L basilic vein dilated to 3-4mm; segment of 8-10cm was mobilized at a/c fossa  Strong thrill  Triphasic L ulnar doppler signal with minimal augmentation with AVF compression  Obese upper arm (BMI 38)  Consider transposition in 3-6months+     No hx of MI  Hx of CHF, EF 65%%, enlarged RA   Stroke ~3 years ago, no residual deficits   Tobacco use: never     Current Outpatient Medications:     blood-glucose sensor (FREESTYLE LIS 3 SENSOR) Evelyn, Change sensor every 15 days. (Patient not taking: Reported on 5/15/2025), Disp: 3 each, Rfl: 11    blood-glucose sensor (FREESTYLE LIS 3 SENSOR) Evelyn, 2 each by Misc.(Non-Drug; Combo Route) route every 14 (fourteen) days. (Patient not taking: Reported on  5/15/2025), Disp: 2 each, Rfl: 6    carvediloL (COREG) 6.25 MG tablet, Take 1 tablet (6.25 mg total) by mouth 2 (two) times daily., Disp: 180 tablet, Rfl: 3    cholecalciferol, vitamin D3, (VITAMIN D3) 125 mcg (5,000 unit) Tab, Take 1 tablet (5,000 Units total) by mouth once daily., Disp: 90 tablet, Rfl: 3    hydrocortisone 2.5 % cream, Apply topically 2 (two) times daily as needed (itching). Apply to affected area twice daily, Disp: 453.6 g, Rfl: 5    ipratropium (ATROVENT) 21 mcg (0.03 %) nasal spray, 2 sprays by Each Nostril route 3 (three) times daily., Disp: 30 mL, Rfl: 5    levothyroxine (SYNTHROID) 112 MCG tablet, Take 1 tablet (112 mcg total) by mouth before breakfast., Disp: 90 tablet, Rfl: 3    LIDOcaine HCl-hydrocortison ac (LIDAMANTLE-HC) 3-0.5 % topical cream, Apply a pea sized amount to the affected area 3 times a day for 7 days., Disp: 30 g, Rfl: 1    mycophenolate (CELLCEPT) 250 mg Cap, Take 3 capsules (750 mg total) by mouth 2 (two) times daily., Disp: 180 capsule, Rfl: 11    predniSONE (DELTASONE) 5 MG tablet, Take by mouth daily; 5/26/2024-6/1/2024: 20 mg, 6/2/2024-6/8/2024: 15 mg; 6/9/2024-6/15/2024: 10 mg; 6/16/2024- forever: 5 mg; do not stop, Disp: 70 tablet, Rfl: 11    tacrolimus (PROGRAF) 1 MG Cap, Take 3 capsules (3 mg total) by mouth once daily AND 2 capsules (2 mg total) every evening., Disp: 150 capsule, Rfl: 11    tirzepatide (MOUNJARO) 5 mg/0.5 mL PnIj, Inject 5 mg into the skin every 7 days., Disp: 2 mL, Rfl: 5    traZODone (DESYREL) 50 MG tablet, Take 1 to 2 tablets ( mg total) by mouth nightly as needed for Insomnia., Disp: 120 tablet, Rfl: 3    valACYclovir (VALTREX) 1000 MG tablet, Take 1 tablet (1,000 mg total) by mouth once daily., Disp: 90 tablet, Rfl: 3    PHYSICAL EXAM:                           Neck: Supple, no significant adenopathy; thyroid is not enlarged          Abdomen: Soft, nontender, nondistended, no masses or organomegaly     No rebound tenderness noted;  bowel sounds normal     No groin adenopathy      Extremities:   3 cm scar overlying L antecubital fossa      Significant obesity upper arm      Proximal L BVT AVF with no thrill no pulsatility          LAB RESULTS:  Lab Results   Component Value Date    K 3.7 05/22/2025    K 4.1 05/19/2025    K 4.0 04/21/2025    CREATININE 1.1 05/22/2025    CREATININE 1.3 05/19/2025    CREATININE 1.3 04/21/2025     Lab Results   Component Value Date    WBC 11.23 05/22/2025    WBC 10.72 05/19/2025    WBC 12.79 (H) 04/21/2025    HCT 38.2 05/22/2025    HCT 38.3 05/19/2025    HCT 41.1 04/21/2025     (L) 05/22/2025     05/19/2025     04/21/2025     Lab Results   Component Value Date    HGBA1C 5.5 02/03/2025    HGBA1C 5.5 05/23/2024    HGBA1C 5.3 08/01/2023     IMAGING:   Ultrasound performed 04/30/2025:  I independently reviewed the images.  Her fistula is occluded.      Prior:  AVF u/s: Mid bicep flow; 819 ml/min.  cm/sec noted in proximal segment  Diam 2.9 prox; then 7.9 - 8mm  Depth 9.6 - 25mm  AVF u/s: flow 453 ml/min (prior: 734 ml/min) PSVs elevated 624 cms distal to anastomosis and 364cm/s distal segment    AVF u/s: flow vol 1,326 ml/min  PSVs 688 and 597 cm/s proximally -- 'long segment narrowing beyond anastomosis'    AVF u/s: flow vol 1127 ml/min  PSVs 706 cm/s proximally -- 'long segment narrowing beyond anastomosis'    IMP/PLAN:     65 y.o. female with a h/o DMII with retinopathy, HTN, stroke and obesity (BMI 40), and multiple AKIs the last was from urinary retention -- s/p L BVT AVF (2-stages) and prior PTA diffuse proximal stenosis -- recurrent proximal single and now mid- lesions  Significantly Obese L upper arm. Unfortunately her fistula has thrombosed. Since she has a functioning kidney transplant, would hold off on placement of new access at this time.  She can follow up with us p.r.n..

## 2025-05-26 ENCOUNTER — OFFICE VISIT (OUTPATIENT)
Dept: TRANSPLANT | Facility: CLINIC | Age: 65
End: 2025-05-26
Payer: MEDICARE

## 2025-05-26 DIAGNOSIS — E11.22 TYPE 2 DM WITH CKD STAGE 3 AND HYPERTENSION: ICD-10-CM

## 2025-05-26 DIAGNOSIS — T45.1X5A IMMUNODEFICIENCY DUE TO LONG TERM IMMUNOSUPPRESSIVE DRUG THERAPY: ICD-10-CM

## 2025-05-26 DIAGNOSIS — Z79.60 IMMUNODEFICIENCY DUE TO LONG TERM IMMUNOSUPPRESSIVE DRUG THERAPY: ICD-10-CM

## 2025-05-26 DIAGNOSIS — Z94.0 S/P KIDNEY TRANSPLANT: Primary | ICD-10-CM

## 2025-05-26 DIAGNOSIS — D84.821 IMMUNODEFICIENCY DUE TO LONG TERM IMMUNOSUPPRESSIVE DRUG THERAPY: ICD-10-CM

## 2025-05-26 DIAGNOSIS — I12.9 TYPE 2 DM WITH CKD STAGE 3 AND HYPERTENSION: ICD-10-CM

## 2025-05-26 DIAGNOSIS — N18.30 TYPE 2 DM WITH CKD STAGE 3 AND HYPERTENSION: ICD-10-CM

## 2025-05-26 PROCEDURE — 3044F HG A1C LEVEL LT 7.0%: CPT | Mod: CPTII,HCNC,95, | Performed by: NURSE PRACTITIONER

## 2025-05-26 PROCEDURE — 98006 SYNCH AUDIO-VIDEO EST MOD 30: CPT | Mod: HCNC,95,, | Performed by: NURSE PRACTITIONER

## 2025-05-26 NOTE — PROGRESS NOTES
The patient location is: [place]  The chief complaint leading to consultation is: Kidney Transplant follow-up and immunosuppression medication management    Visit type: audiovisual    Face to Face time with patient: 20 minutes of total time spent on the encounter, which includes face to face time and non-face to face time preparing to see the patient (eg, review of tests), Obtaining and/or reviewing separately obtained history, Documenting clinical information in the electronic or other health record, Independently interpreting results (not separately reported) and communicating results to the patient/family/caregiver, or Care coordination (not separately reported).     Each patient to whom he or she provides medical services by telemedicine is:  (1) informed of the relationship between the physician and patient and the respective role of any other health care provider with respect to management of the patient; and (2) notified that he or she may decline to receive medical services by telemedicine and may withdraw from such care at any time.       Kidney Post-Transplant Assessment    Referring Physician: Fred Kelly  Current Nephrologist: Fred Kelly    ORGAN: LEFT KIDNEY  Donor Type: living  PHS Increased Risk: no  Cold Ischemia: 60 mins  Induction Medications:     Subjective:     CC:  Reassessment of renal allograft function and management of chronic immunosuppression.    HPI:  Ms. Quezada is a 65 y.o. year old Black or  female with history of ESRD secondary to DM2 who received a living kidney transplant on 5/23/24. She has a history of CVA in 2017 without any deficit. She also has factor XII deficiency, history of hep C (s/p treatment with harvoni), and pHTN (RHC performed on 6/7/23 and found to have group 2 HTN. TTE and cardiac PET with preserved EF).  Her most recent creatinine is 1.4. She takes mycophenolate mofetil, prednisone, and tacrolimus for maintenance immunosuppression. Her  post transplant course has been uncomplicated to date.  -Due to factor XII deficiency she received 1 unit of FFP in OR and 1 unit of FFP on POD#1. She received standard DVT prophylaxis while inpatient and was transitioned to prophylaxis lovenox x 2 weeks on discharge.     Interval HX:  Following with Id for syphilis treatment-- IV antibiotics completing this week    Reports adequate Intake  UOP-no problems reported   Peripheral edema-no  Appetite-denies N/V/D; tolerating IS meds well   Reports being more active. Cooking for the whole family. Plans on going to her sister birthday party this weekend  BP  BP Readings from Last 3 Encounters:   05/23/25 (!) 129/93   05/15/25 116/83   03/12/25 (!) 145/80   Home BPs 115-120s/80     Lab /diagnostic results reviewed with patient today.   All questions answered       Past Medical History:   Diagnosis Date    Acute respiratory failure with hypoxia and hypercapnia 05/26/2019    ATN (acute tubular necrosis) 02/08/2019    Chronic diastolic heart failure 02/07/2019 2-8-19 Mild left atrial enlargement. Mild left ventricular enlargement. Normal left ventricular systolic function. The estimated ejection fraction is 55% Indeterminate left ventricular diastolic function. Normal right ventricular systolic function. Mild mitral regurgitation. Mild to moderate tricuspid regurgitation. The estimated PA systolic pressure is 32 mm Hg Normal central venous pressure (3 m    CKD (chronic kidney disease) stage 4, GFR 15-29 ml/min 05/16/2018    CKD stage G3a/A3, GFR 45-59 and albumin creatinine ratio >300 mg/g 05/16/2018    CKD stage G3b/A3, GFR 30-44 and albumin creatinine ratio >300 mg/g 05/16/2018    Closed compression fracture of L2 lumbar vertebra 05/24/2019    Closed compression fracture of L2 lumbar vertebra 05/24/2019     IMO Regulatory Load October 2019    Clotted dialysis access 07/22/2023    Controlled type 2 diabetes with retinopathy 04/09/2018    COVID-19 virus infection 7-1-2021  07/01/2021    CVA (cerebral vascular accident) 2017    Diabetic polyneuropathy associated with type 2 diabetes mellitus 12/04/2020    Encounter for blood transfusion     Essential hypertension 04/09/2018    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  7-31-19 Factor XI Activity 55 - 145 % 96       Gastroesophageal reflux disease without esophagitis 05/24/2019    Generalized anxiety disorder 11/08/2019    GERD (gastroesophageal reflux disease)     GIB (gastrointestinal bleeding) 10/18/2018    History of CVA (cerebrovascular accident) 11/27/2022    History of CVA (cerebrovascular accident) without residual deficits 11/27/2022    History of hepatitis C     s/p succesful Rx w/ Harvoni  - SVR12 (cure) 2016    Hypertensive retinopathy, bilateral 12/10/2019    Mild nonproliferative diabetic retinopathy 05/02/2018    Mixed hyperlipidemia 04/09/2018    Myalgia due to statin 09/07/2022    Nephrotic range proteinuria 04/09/2018    Normocytic anemia 01/14/2022    NS (nuclear sclerosis), bilateral 12/10/2019    Other osteoporosis without current pathological fracture 10/11/2022      COMPARISON:  2022  FINDINGS:  Lumbar spine (L3-L4):               T-score is -1.7, and Z-score is -0.8.  Compared with previous DXA, BMD at the lumbar spine has remained stable.   Femoral neck:                          T-score is -0.9, and Z-score is -0.3.  Total hip:                                T-score is -1.1, and Z-score is -0.6.     Compared with previous DXA, BMD at the total hip     Peanut allergy 02/04/2023    PNA (pneumonia) 05/28/2019    Postoperative hypothyroidism     Prophylactic immunotherapy 05/24/2024    Pulmonary hypertension 02/06/2023    Renovascular hypertension 04/09/2018    S/P arteriovenous (AV) fistula creation 08/14/2019 8/14/19: L2 kyphoplasty with Dr. Kwon       S/P kidney transplant 05/24/2024    S/P kyphoplasty 08/14/2019 8/14/19: L2 kyphoplasty with Dr. Kwon     Slow transit constipation  05/27/2019    Type 2 diabetes mellitus with circulatory disorder, with long-term current use of insulin 04/09/2018    Type 2 diabetes mellitus with stage 3 chronic kidney disease, without long-term current use of insulin 04/09/2018    Type 2 diabetes mellitus with stage 3a chronic kidney disease, without long-term current use of insulin 03/03/2021    Type 2 diabetes mellitus with stage 3b chronic kidney disease, without long-term current use of insulin 09/07/2022       Review of Systems   Constitutional:  Negative for activity change and fever.   Eyes:  Positive for visual disturbance.        Wears glasses   Respiratory:  Negative for shortness of breath.    Cardiovascular:  Negative for chest pain and leg swelling.   Gastrointestinal:  Negative for constipation, diarrhea and nausea.   Genitourinary:  Negative for difficulty urinating, frequency and hematuria.   Musculoskeletal:  Negative for arthralgias and myalgias.   Allergic/Immunologic: Positive for immunocompromised state.   Neurological:  Negative for weakness and numbness.   Psychiatric/Behavioral:  Negative for sleep disturbance. The patient is not nervous/anxious.        Objective:   There were no vitals taken for this visit.body mass index is unknown because there is no height or weight on file.    Physical Exam  Deferred due to AV visit  Labs:  Lab Results   Component Value Date    WBC 11.23 05/22/2025    HGB 12.2 05/22/2025    HCT 38.2 05/22/2025     05/22/2025    K 3.7 05/22/2025     05/22/2025    CO2 24 05/22/2025    BUN 13 05/22/2025    CREATININE 1.1 05/22/2025    EGFRNORACEVR 57 (L) 05/22/2025    CALCIUM 9.5 05/22/2025    PHOS 3.3 05/19/2025    MG 1.8 05/19/2025    ALBUMIN 3.7 05/22/2025    AST 27 05/22/2025    ALT 22 05/22/2025    UTPCR  05/19/2025      Comment:      UNABLE TO CALCULATE    .7 (H) 08/10/2023    TACROLIMUS 8.0 05/19/2025       Labs were reviewed with the patient    Assessment:     1. S/P kidney transplant 5/23/24.     2. Immunodeficiency due to long term immunosuppressive drug therapy    3. Type 2 DM with CKD stage 3 and hypertension        Plan:   Routine f/u  F/u with Id for her syphilis treatment management  Needs to follow with her PCP (health/cancer screenings) and General Nephrology for CKD care    1. Immunosuppression: Prograf trough 8,  Continue Prograf 3/2   mg BID, and Prednisone. Will continue to monitor for drug toxicities      2. Allograft Function: ckd 3b, Stable. Continue good oral hydration.   - ESRD secondary to DM2 s/p living kidney transplant on 5/23/24.   - post transplant course has been uncomplicated to date.  -BL ~1.3-1.4  Lab Results   Component Value Date    CREATININE 1.1 05/22/2025      Latest Reference Range & Units 11mo 4wk,  Kidney-Post 1 Year  05/22/25 14:15   Creatinine 0.5 - 1.4 mg/dL 1.1   eGFR >60 mL/min/1.73/m2 57 (L)   Glucose 70 - 110 mg/dL 100         DM : cont insulin as prescribed --mgmt deferred to endocrinology  Lab Results   Component Value Date    HGBA1C 5.5 02/03/2025       3. Hypertension management: advise low salt diet and home BP monitoring    BP Readings from Last 3 Encounters:   05/23/25 (!) 129/93   05/15/25 116/83   03/12/25 (!) 145/80   Coreg 6.25mg BID    4. Metabolic Bone Disease/Secondary Hyperparathyroidism:  Vit D 5000units daily  Lab Results   Component Value Date    .7 (H) 08/10/2023    CALCIUM 9.5 05/22/2025    PHOS 3.3 05/19/2025      Latest Reference Range & Units 11mo 3wk,  Kidney-Post 1 Year  05/19/25 07:17   Magnesium  1.6 - 2.6 mg/dL 1.8         5. Electrolytes: stable. No need for intervention   Lab Results   Component Value Date     05/22/2025    K 3.7 05/22/2025     05/22/2025    CO2 24 05/22/2025       6. Anemia: stable. No need for intervention   Lab Results   Component Value Date    WBC 11.23 05/22/2025    HGB 12.2 05/22/2025    HCT 38.2 05/22/2025    MCV 83 05/22/2025     (L) 05/22/2025         7. Proteinuria: continue  p/c ratio as per guidelines   [facility]  2/25/25---0.12       8. BK virus infection screening:  BK PCR per protocol   2/25/25 not detected <125    9. Weight education: provided during the clinic visit   There is no height or weight on file to calculate BMI.     10. Patient safety education regarding immunosuppression including prophylaxis posttransplant for CMV, PCP  completed  PCP ppx: Atovaquone until 11/19/24  CMV ppx: Valcyte until 8/21/24     Hypothyroidism-- levothyroxine 125mcg before breakfast      Follow-up:   Clinic: return to transplant clinic weekly for the first month after transplant; every 2 weeks during months 2-3; then at 6-, 9-, 12-, 18-, 24-, and 36- months post-transplant to reassess for complications from immunosuppression toxicity and monitor for rejection.  Annually thereafter.    Labs: since patient remains at high risk for rejection and drug-related complications that warrant close monitoring, labs will be ordered as follows: continue twice weekly CBC, renal panel, and drug level for first month; then same labs once weekly through 3rd month post-transplant.  Urine for UA and protein/creatinine ratio monthly.  Serum BK - PCR at 1-, 3-, 6-, 9-, 12-, 18-, 24-, 36-, 48-, and 60 months post-transplant.  Hepatic panel at 1-, 2-, 3-, 6-, 9-, 12-, 18-, 24-, and 36- months post-transplant.    Maria Del Carmen Holland NP       Education:   Material provided to the patient.  Patient reminded to call with any health changes since these can be early signs of significant complications.  Also, I advised the patient to be sure any new medications or changes of old medications are discussed with either a pharmacist or physician knowledgeable with transplant to avoid rejection/drug toxicity related to significant drug interactions.    Patient advised that it is recommended that all transplanted patients, and their close contacts and household members receive Covid vaccination.

## 2025-05-26 NOTE — LETTER
May 26, 2025        Fred Kelly  [address]  [facility] [address]  Phone: [phone]  Fax: [phone]             [facility]  [address]  [facility] [address]  Phone: [phone]   Patient: Kathie Quezada   MR Number: [id number]   YOB: 1960   Date of Visit: 5/26/2025       Dear Dr. Fred Kelly    Thank you for referring Kathie Quezada to me for evaluation. Attached you will find relevant portions of my assessment and plan of care.    If you have questions, please do not hesitate to call me. I look forward to following Kathie Quezada along with you.    Sincerely,    Maria Del Carmen Holland NP    Enclosure    If you would like to receive this communication electronically, please contact [email or web address] or [phone] to request [employer] access.    [employer] is a tool which provides read-only access to select patient information with whom you have a relationship. Its easy to use and provides real time access to review your patients record including encounter summaries, notes, results, and demographic information.    If you feel you have received this communication in error or would no longer like to receive these types of communications, please e-mail [email or web address]

## 2025-06-02 ENCOUNTER — PATIENT MESSAGE (OUTPATIENT)
Dept: ENDOCRINOLOGY | Facility: CLINIC | Age: 65
End: 2025-06-02
Payer: MEDICARE

## 2025-06-03 ENCOUNTER — DOCUMENTATION ONLY (OUTPATIENT)
Dept: ENDOCRINOLOGY | Facility: CLINIC | Age: 65
End: 2025-06-03
Payer: MEDICARE

## 2025-06-03 ENCOUNTER — TELEPHONE (OUTPATIENT)
Dept: ENDOCRINOLOGY | Facility: CLINIC | Age: 65
End: 2025-06-03
Payer: MEDICARE

## 2025-06-04 ENCOUNTER — PATIENT MESSAGE (OUTPATIENT)
Dept: ENDOCRINOLOGY | Facility: CLINIC | Age: 65
End: 2025-06-04
Payer: MEDICARE

## 2025-06-05 DIAGNOSIS — Z94.0 S/P KIDNEY TRANSPLANT: ICD-10-CM

## 2025-06-05 RX ORDER — PREDNISONE 5 MG/1
TABLET ORAL
Qty: 70 TABLET | Refills: 11 | Status: CANCELLED | OUTPATIENT
Start: 2025-06-05

## 2025-06-06 ENCOUNTER — RESULTS FOLLOW-UP (OUTPATIENT)
Dept: ALLERGY | Facility: CLINIC | Age: 65
End: 2025-06-06

## 2025-06-24 ENCOUNTER — CLINICAL SUPPORT (OUTPATIENT)
Dept: OPHTHALMOLOGY | Facility: CLINIC | Age: 65
End: 2025-06-24
Payer: MEDICARE

## 2025-06-24 ENCOUNTER — OFFICE VISIT (OUTPATIENT)
Dept: OPHTHALMOLOGY | Facility: CLINIC | Age: 65
End: 2025-06-24
Payer: MEDICARE

## 2025-06-24 DIAGNOSIS — E11.3213 CONTROLLED TYPE 2 DIABETES MELLITUS WITH BOTH EYES AFFECTED BY MILD NONPROLIFERATIVE RETINOPATHY AND MACULAR EDEMA, WITHOUT LONG-TERM CURRENT USE OF INSULIN: ICD-10-CM

## 2025-06-24 DIAGNOSIS — H35.033 HYPERTENSIVE RETINOPATHY, BILATERAL: ICD-10-CM

## 2025-06-24 DIAGNOSIS — E11.3553 CONTROLLED TYPE 2 DIABETES MELLITUS WITH STABLE PROLIFERATIVE RETINOPATHY OF BOTH EYES, UNSPECIFIED WHETHER LONG TERM INSULIN USE: Primary | ICD-10-CM

## 2025-06-24 PROCEDURE — 92014 COMPRE OPH EXAM EST PT 1/>: CPT | Mod: HCNC,S$GLB,, | Performed by: OPHTHALMOLOGY

## 2025-06-24 PROCEDURE — 99999 PR PBB SHADOW E&M-EST. PATIENT-LVL III: CPT | Mod: PBBFAC,HCNC,, | Performed by: OPHTHALMOLOGY

## 2025-06-24 PROCEDURE — 1160F RVW MEDS BY RX/DR IN RCRD: CPT | Mod: CPTII,HCNC,S$GLB, | Performed by: OPHTHALMOLOGY

## 2025-06-24 PROCEDURE — 92201 OPSCPY EXTND RTA DRAW UNI/BI: CPT | Mod: HCNC,S$GLB,, | Performed by: OPHTHALMOLOGY

## 2025-06-24 PROCEDURE — 3044F HG A1C LEVEL LT 7.0%: CPT | Mod: CPTII,HCNC,S$GLB, | Performed by: OPHTHALMOLOGY

## 2025-06-24 PROCEDURE — 1159F MED LIST DOCD IN RCRD: CPT | Mod: CPTII,HCNC,S$GLB, | Performed by: OPHTHALMOLOGY

## 2025-06-24 PROCEDURE — 92134 CPTRZ OPH DX IMG PST SGM RTA: CPT | Mod: HCNC,S$GLB,, | Performed by: OPHTHALMOLOGY

## 2025-06-24 NOTE — PROGRESS NOTES
HPI     Diabetic Eye Exam     Additional comments: 1 yr           Comments    Patient here today with c/o blurry VA, itching and FB sensation OD for   months, no pain ou and occasional floaters with flashes ou.          Last edited by Krystle Chao on 6/24/2025 10:57 AM.          OCT - DME OD persistent but stable  OS - low grade parafoveal ME    Prior [facility] FA - peripheral NP and dropout OU  Late macular leakage OU      A/P    1. Mod NPDR OU  T2 controlled without insulin    2. DME OD>OS    2/20 increase in DME  S/p Avastin OD x 5  S/p Ozurdex OD x 1 11/20    DME OD resolved after 1 Ozurdex    obs today    3. HTN Ret OU    BS/BP/cholc ontrol    4. NS OU  Increasing, but Sx ok for now    5. S/p Kidney tx 5/24      1 year OCT

## 2025-06-30 ENCOUNTER — RESULTS FOLLOW-UP (OUTPATIENT)
Dept: ENDOCRINOLOGY | Facility: HOSPITAL | Age: 65
End: 2025-06-30

## 2025-07-28 ENCOUNTER — EXTERNAL HOME HEALTH (OUTPATIENT)
Dept: HOME HEALTH SERVICES | Facility: HOSPITAL | Age: 65
End: 2025-07-28
Payer: MEDICARE

## 2025-07-30 ENCOUNTER — DOCUMENT SCAN (OUTPATIENT)
Dept: HOME HEALTH SERVICES | Facility: HOSPITAL | Age: 65
End: 2025-07-30
Payer: MEDICARE

## 2025-08-12 ENCOUNTER — OFFICE VISIT (OUTPATIENT)
Dept: ENDOCRINOLOGY | Facility: CLINIC | Age: 65
End: 2025-08-12
Payer: MEDICARE

## 2025-08-12 DIAGNOSIS — E11.22 TYPE 2 DIABETES MELLITUS WITH STAGE 3B CHRONIC KIDNEY DISEASE, WITH LONG-TERM CURRENT USE OF INSULIN: ICD-10-CM

## 2025-08-12 DIAGNOSIS — E66.09 CLASS 1 OBESITY DUE TO EXCESS CALORIES WITH SERIOUS COMORBIDITY AND BODY MASS INDEX (BMI) OF 31.0 TO 31.9 IN ADULT: ICD-10-CM

## 2025-08-12 DIAGNOSIS — E55.9 VITAMIN D DEFICIENCY: ICD-10-CM

## 2025-08-12 DIAGNOSIS — E11.22 TYPE 2 DM WITH CKD STAGE 3 AND HYPERTENSION: ICD-10-CM

## 2025-08-12 DIAGNOSIS — Z94.0 S/P KIDNEY TRANSPLANT: ICD-10-CM

## 2025-08-12 DIAGNOSIS — E78.2 MIXED HYPERLIPIDEMIA: ICD-10-CM

## 2025-08-12 DIAGNOSIS — N18.30 TYPE 2 DM WITH CKD STAGE 3 AND HYPERTENSION: ICD-10-CM

## 2025-08-12 DIAGNOSIS — M81.8 OTHER OSTEOPOROSIS WITHOUT CURRENT PATHOLOGICAL FRACTURE: ICD-10-CM

## 2025-08-12 DIAGNOSIS — N18.30 BENIGN HYPERTENSION WITH CKD (CHRONIC KIDNEY DISEASE) STAGE III: ICD-10-CM

## 2025-08-12 DIAGNOSIS — E89.0 POSTOPERATIVE HYPOTHYROIDISM: Primary | ICD-10-CM

## 2025-08-12 DIAGNOSIS — N18.32 TYPE 2 DIABETES MELLITUS WITH STAGE 3B CHRONIC KIDNEY DISEASE, WITH LONG-TERM CURRENT USE OF INSULIN: ICD-10-CM

## 2025-08-12 DIAGNOSIS — Z79.4 TYPE 2 DIABETES MELLITUS WITH STAGE 3B CHRONIC KIDNEY DISEASE, WITH LONG-TERM CURRENT USE OF INSULIN: ICD-10-CM

## 2025-08-12 DIAGNOSIS — I12.9 BENIGN HYPERTENSION WITH CKD (CHRONIC KIDNEY DISEASE) STAGE III: ICD-10-CM

## 2025-08-12 DIAGNOSIS — I12.9 TYPE 2 DM WITH CKD STAGE 3 AND HYPERTENSION: ICD-10-CM

## 2025-08-12 DIAGNOSIS — E66.811 CLASS 1 OBESITY DUE TO EXCESS CALORIES WITH SERIOUS COMORBIDITY AND BODY MASS INDEX (BMI) OF 31.0 TO 31.9 IN ADULT: ICD-10-CM

## 2025-08-12 PROCEDURE — 95251 CONT GLUC MNTR ANALYSIS I&R: CPT | Mod: HCNC,NDTC,,

## 2025-08-12 PROCEDURE — 3044F HG A1C LEVEL LT 7.0%: CPT | Mod: CPTII,HCNC,95,

## 2025-08-12 PROCEDURE — 98006 SYNCH AUDIO-VIDEO EST MOD 30: CPT | Mod: HCNC,95,,

## 2025-08-12 RX ORDER — BLOOD-GLUCOSE SENSOR
1 EACH MISCELLANEOUS
Qty: 2 EACH | Refills: 11 | Status: SHIPPED | OUTPATIENT
Start: 2025-08-12 | End: 2026-08-12

## 2025-08-12 RX ORDER — TIRZEPATIDE 7.5 MG/.5ML
7.5 INJECTION, SOLUTION SUBCUTANEOUS
Qty: 2 ML | Refills: 11 | Status: SHIPPED | OUTPATIENT
Start: 2025-08-12

## 2025-08-13 ENCOUNTER — TELEPHONE (OUTPATIENT)
Dept: ENDOCRINOLOGY | Facility: CLINIC | Age: 65
End: 2025-08-13
Payer: MEDICARE

## 2025-08-31 DIAGNOSIS — Z94.0 S/P KIDNEY TRANSPLANT: ICD-10-CM

## 2025-08-31 RX ORDER — PREDNISONE 5 MG/1
TABLET ORAL
Qty: 70 TABLET | Refills: 11 | Status: CANCELLED | OUTPATIENT
Start: 2025-08-31

## 2025-09-03 DIAGNOSIS — Z94.0 S/P KIDNEY TRANSPLANT: ICD-10-CM

## 2025-09-03 RX ORDER — PREDNISONE 5 MG/1
TABLET ORAL
Qty: 70 TABLET | Refills: 11 | Status: SHIPPED | OUTPATIENT
Start: 2025-09-03

## (undated) DEVICE — POSITIONER IV ARMBOARD FOAM

## (undated) DEVICE — ADHESIVE DERMABOND ADVANCED

## (undated) DEVICE — DRAPE C-ARM FOR MOBILE XRAY

## (undated) DEVICE — TRAY CATH LAB OMC

## (undated) DEVICE — SOL NS 1000CC

## (undated) DEVICE — SUT 2-0 VICRYL / SH (J417)

## (undated) DEVICE — CLOSURE SKIN STERI STRIP 1/2X4

## (undated) DEVICE — TUBING CNTRST INJ ADPT 72IN

## (undated) DEVICE — GAUZE DRAIN N WVN 6PLY 4X4IN

## (undated) DEVICE — SUT 7/0 24IN PROLENE BL MO

## (undated) DEVICE — SET DECANTER MEDICHOICE

## (undated) DEVICE — GAUZE SPONGE 4X4 12PLY

## (undated) DEVICE — DRAPE HAND STERILE

## (undated) DEVICE — FLOWSWITCH HP 1-W W/O LL

## (undated) DEVICE — SEE MEDLINE ITEM 156905

## (undated) DEVICE — GLOVE SURG BIOGEL LATEX SZ 7.5

## (undated) DEVICE — FIBRILLAR ABS HEMOSTAT 4X4

## (undated) DEVICE — DRAIN CHANNEL ROUND 19FR

## (undated) DEVICE — SUT PDS BV 6-0

## (undated) DEVICE — DECANTER FLUID TRNSF WHITE 9IN

## (undated) DEVICE — FOLEY BLLN 20FR 3WAY 5CC

## (undated) DEVICE — LOOP VESSEL BLUE MAXI

## (undated) DEVICE — GUIDEWIRE STD .018X180CM ANG

## (undated) DEVICE — CATH SWAN GANZ STND 7FR

## (undated) DEVICE — KIT PROBE COVER WITH GEL

## (undated) DEVICE — INFLATOR ADVANTAGE ENCORE 26

## (undated) DEVICE — GLIDESHEATH SLENDER SS 5FR10CM

## (undated) DEVICE — SEE MEDLINE ITEM 153688

## (undated) DEVICE — DRAPE PED LAP SURG 108X77IN

## (undated) DEVICE — COVERS PROBE NR-48 STERILE

## (undated) DEVICE — CATH GLIDE ANGLED 5FR 65CM

## (undated) DEVICE — KIT GLIDESHEATH SLEND 6FR 10CM

## (undated) DEVICE — CANNULA VESSEL

## (undated) DEVICE — PACK ENDOSCOPY GENERAL

## (undated) DEVICE — SPONGE DERMACEA 4X4IN 12PLY

## (undated) DEVICE — CATH LUTONIX DCB 035X130X6X80

## (undated) DEVICE — STOPCOCK 3 WAY MED PRESSURE

## (undated) DEVICE — Device

## (undated) DEVICE — OMNIPAQUE 350 200ML

## (undated) DEVICE — SUT LIGACLIP SMALL XTRA

## (undated) DEVICE — SPONGE LAP 4X18 PREWASHED

## (undated) DEVICE — DRESSING TRANS 2X2 TEGADERM

## (undated) DEVICE — CATH STERLING MR 6X60X135

## (undated) DEVICE — GUIDEWIRE ADVNTG 018X180CM ANG

## (undated) DEVICE — STOCKINETTE 2INX36

## (undated) DEVICE — DRESSING ADH ISLAND 2.5 X 3

## (undated) DEVICE — FILTER HYDROPHOBIC BACTRLOGCL

## (undated) DEVICE — UNDERGLOVE BIOGEL PI SZ 6.5 LF

## (undated) DEVICE — BAND TR WITH INFLATOR

## (undated) DEVICE — SUT ETHILON 3-0 FS-1 30

## (undated) DEVICE — DRAPE SLUSH WARMER WITH DISC

## (undated) DEVICE — SET IRR URLGY 2LINE UNIV SPIKE

## (undated) DEVICE — SPONGE LAP 18X18 PREWASHED

## (undated) DEVICE — SEE MEDLINE ITEM 157150

## (undated) DEVICE — CATH STERLING MR 7X40X135

## (undated) DEVICE — CLIP MED TICALL

## (undated) DEVICE — VALVE CONTROL COPILOT

## (undated) DEVICE — HEMOSTAT SURGICEL 4X8IN

## (undated) DEVICE — APPLICATOR CHLORAPREP ORN 26ML

## (undated) DEVICE — CLIP SPRING 6MM

## (undated) DEVICE — SEE MEDLINE ITEM 157194

## (undated) DEVICE — TRAY CATH 1-LYR URIMTR 16FR

## (undated) DEVICE — PLUG CATHETER STERILE FOLEY

## (undated) DEVICE — ELECTRODE REM PLYHSV RETURN 9

## (undated) DEVICE — SYR ONLY LUER LOCK 20CC

## (undated) DEVICE — SUT SILK 2-0 SH 18IN BLACK

## (undated) DEVICE — DRESSING TRANS 4X4 TEGADERM

## (undated) DEVICE — SHEATH INTRODUCER 7FR 11CM

## (undated) DEVICE — NDL SAFETY 22G X 1.5 ECLIPSE

## (undated) DEVICE — INFLATOR ENCORE

## (undated) DEVICE — TOWEL OR XRAY WHITE 17X26IN

## (undated) DEVICE — SUT SILK 2-0 STRANDS 30IN

## (undated) DEVICE — PACK KIDNEY TRANSPLANT CUSTOM

## (undated) DEVICE — SUT PROLENE 5-0 36IN C-1

## (undated) DEVICE — SUT MCRYL PLUS 4-0 PS2 27IN

## (undated) DEVICE — SUT PROLENE 6-0 BV-1 30IN

## (undated) DEVICE — STAPLER SKIN PROXIMATE WIDE

## (undated) DEVICE — SET MICROPUNCT 5FRMPIS-501

## (undated) DEVICE — SUPPORT ULNA NERVE PROTECTOR

## (undated) DEVICE — MARKER SKIN STND TIP BLUE BARR

## (undated) DEVICE — BOOT SUTURE AID

## (undated) DEVICE — SWABSTICK BENZOIN 4 IN

## (undated) DEVICE — KIT MICROINTRO 4F .018X40X7CM

## (undated) DEVICE — GLOVE SURGICAL LATEX SZ 6.5

## (undated) DEVICE — CLIPPER BLADE MOD 4406 (CAREF)

## (undated) DEVICE — INFLATOR ENCORE 26 BLLN INFL

## (undated) DEVICE — CATH STERLING MR 6X40X135

## (undated) DEVICE — ELECTRODE REM POLYHESIVE II

## (undated) DEVICE — SUT 2-0 12-18IN SILK

## (undated) DEVICE — HEMOSTAT SURGICEL NU-KNIT 6X9

## (undated) DEVICE — KIT INTRO MICRO NIT VSI 4FR

## (undated) DEVICE — VISE RADIFOCUS MULTI TORQUE

## (undated) DEVICE — INSERTS STEALTH FIBRA SIZE 2

## (undated) DEVICE — SUT VICRYL 3-0 27 SH

## (undated) DEVICE — GUIDEWIRE GT 45DEG .018 180CM

## (undated) DEVICE — TIP YANKAUERS BULB NO VENT

## (undated) DEVICE — SUT SILK 3-0 STRANDS 30IN

## (undated) DEVICE — DRESSING TELFA STRL 4X3 LF

## (undated) DEVICE — SUT 4-0 12-18IN SILK BLACK

## (undated) DEVICE — SPONGE DERMACEA GAUZE 4X4

## (undated) DEVICE — COVER LIGHT HANDLE 80/CA

## (undated) DEVICE — DRAPE STERI-DRAPE 1000 17X11IN

## (undated) DEVICE — INSERT CUSHIONPRONE VIEW LARGE

## (undated) DEVICE — BLADE SURG CARBON STEEL SZ11

## (undated) DEVICE — PACK AV VASCULAR ACCESS OMC

## (undated) DEVICE — EVACUATOR WOUND BULB 100CC

## (undated) DEVICE — INSERTS STEALTH FIBRA SIZE 1.

## (undated) DEVICE — TOWEL OR DISP STRL BLUE 4/PK

## (undated) DEVICE — COVER PROBE US 5.5X58L NON LTX

## (undated) DEVICE — KIT CUSTOM WASTE BAG

## (undated) DEVICE — DRESSING TEGADERM 4.4X5IN

## (undated) DEVICE — GLIDE CATH ANGLED 4FR 65CM

## (undated) DEVICE — DRAIN SIL FLAT FULL FLUTD 10FR

## (undated) DEVICE — SUT 1 48IN PDS II VIO MONO

## (undated) DEVICE — GOWN SURGICAL X-LARGE

## (undated) DEVICE — SUT 3-0 12-18IN SILK

## (undated) DEVICE — HEMOSTAT VASC BAND REG 24CM

## (undated) DEVICE — COVER OVERHEAD SURG LT BLUE

## (undated) DEVICE — FOAM APP FILM BARRIER NO STING

## (undated) DEVICE — SUT PROLENE 6-0 24 BV-1

## (undated) DEVICE — SEE MEDLINE ITEM 157117

## (undated) DEVICE — DRESSING ABSRBNT ISLAND 3.6X8

## (undated) DEVICE — SUT SILK 3-0 SH 18IN BLACK

## (undated) DEVICE — SET MICROPUNCTURE 5FR 501NT

## (undated) DEVICE — SPONGE GAUZE 16PLY 4X4

## (undated) DEVICE — GUIDEWIRE STF .035X180CM ANG

## (undated) DEVICE — SUT VICRYL CTD 2-0 GI 27 SH

## (undated) DEVICE — COVER HD BACK TABLE 6FT

## (undated) DEVICE — SHEATH PINNACLE 6FRX6CM

## (undated) DEVICE — SUT VICRYL PLUS 3-0 SH 18IN

## (undated) DEVICE — SEE MEDLINE ITEM 147518

## (undated) DEVICE — DRAPE ANGIO BRACH 38X44IN

## (undated) DEVICE — PUNCH AORTIC 4.0MM 6/CASE

## (undated) DEVICE — DRAPE U SPLIT SHEET 54X76IN

## (undated) DEVICE — UNDERGLOVES BIOGEL PI SIZE 8

## (undated) DEVICE — KIT CUSTOM PROCEDURE CONTRAST